# Patient Record
Sex: MALE
[De-identification: names, ages, dates, MRNs, and addresses within clinical notes are randomized per-mention and may not be internally consistent; named-entity substitution may affect disease eponyms.]

---

## 2015-12-08 LAB
ALBUMIN SERPL-MCNC: 3.7 G/DL (ref 3.5–5)
ALBUMIN/GLOB SERPL: 1.1 {RATIO} (ref 1–2.1)
ALT SERPL-CCNC: 40 U/L (ref 21–72)
ARTERIAL BLOOD GAS HEMOGLOBIN: 14.2 G/DL (ref 11.7–17.4)
ARTERIAL BLOOD GAS O2 SAT: 99.8 % (ref 95–98)
ARTERIAL BLOOD GAS PCO2: 39 MM/HG (ref 35–45)
ARTERIAL BLOOD GAS TCO2: 23.7 MMOL/L (ref 22–28)
ARTERIAL PATENCY WRIST A: YES
AST SERPL-CCNC: 133 U/L (ref 17–59)
BASOPHILS # BLD AUTO: 0.1 K/UL (ref 0–0.2)
BASOPHILS NFR BLD: 0.5 % (ref 0–2)
BUN SERPL-MCNC: 18 MG/DL (ref 9–20)
BUN SERPL-MCNC: 24 MG/DL (ref 9–20)
CALCIUM SERPL-MCNC: 8.5 MG/DL (ref 8.4–10.2)
CALCIUM SERPL-MCNC: 8.6 MG/DL (ref 8.4–10.2)
EOSINOPHIL # BLD AUTO: 0.3 K/UL (ref 0–0.7)
EOSINOPHIL NFR BLD: 2.4 % (ref 0–4)
ERYTHROCYTE [DISTWIDTH] IN BLOOD BY AUTOMATED COUNT: 14.9 % (ref 11.5–14.5)
FOLATE SERPL-MCNC: 11.1 NG/ML
GFR NON-AFRICAN AMERICAN: 49
GFR NON-AFRICAN AMERICAN: > 60
HCO3 BLDA-SCNC: 22.8 MMOL/L (ref 21–28)
HGB BLD-MCNC: 13.4 G/DL (ref 12–18)
INHALED O2 CONCENTRATION: 100 %
LYMPHOCYTES # BLD AUTO: 5.9 K/UL (ref 1–4.3)
LYMPHOCYTES NFR BLD AUTO: 42.9 % (ref 20–40)
MCH RBC QN AUTO: 33.4 PG (ref 27–31)
MCHC RBC AUTO-ENTMCNC: 31.1 G/DL (ref 33–37)
MCV RBC AUTO: 107.5 FL (ref 80–94)
MONOCYTES # BLD: 0.9 K/UL (ref 0–0.8)
MONOCYTES NFR BLD: 6.6 % (ref 0–10)
NEUTROPHILS # BLD: 6.5 K/UL (ref 1.8–7)
NEUTROPHILS NFR BLD AUTO: 47.6 % (ref 50–75)
NRBC BLD AUTO-RTO: 0.1 % (ref 0–0)
O2 CAP BLDA-SCNC: 19.2 ML/DL (ref 16–24)
O2 CT BLDA-SCNC: 19.2 ML/DL (ref 15–23)
PH BLDA: 7.37 [PH] (ref 7.35–7.45)
PLATELET # BLD: 199 K/UL (ref 130–400)
PMV BLD AUTO: 7.9 FL (ref 7.2–11.7)
PO2 BLDA: 322 MM/HG (ref 80–100)
RBC # BLD AUTO: 4.02 MIL/UL (ref 4.4–5.9)
WBC # BLD AUTO: 13.6 K/UL (ref 4.8–10.8)

## 2015-12-08 PROCEDURE — 6A4Z0ZZ HYPOTHERMIA, SINGLE: ICD-10-PCS | Performed by: FAMILY MEDICINE

## 2015-12-08 PROCEDURE — 3E0336Z INTRODUCTION OF NUTRITIONAL SUBSTANCE INTO PERIPHERAL VEIN, PERCUTANEOUS APPROACH: ICD-10-PCS | Performed by: FAMILY MEDICINE

## 2015-12-08 PROCEDURE — 5A1955Z RESPIRATORY VENTILATION, GREATER THAN 96 CONSECUTIVE HOURS: ICD-10-PCS | Performed by: FAMILY MEDICINE

## 2015-12-08 PROCEDURE — 3E0234Z INTRODUCTION OF SERUM, TOXOID AND VACCINE INTO MUSCLE, PERCUTANEOUS APPROACH: ICD-10-PCS | Performed by: FAMILY MEDICINE

## 2015-12-08 RX ADMIN — WATER SCH MLS/HR: 1 INJECTION INTRAMUSCULAR; INTRAVENOUS; SUBCUTANEOUS at 20:50

## 2015-12-08 RX ADMIN — WATER SCH MLS/HR: 1 INJECTION INTRAMUSCULAR; INTRAVENOUS; SUBCUTANEOUS at 15:31

## 2015-12-08 NOTE — CARD
--------------- APPROVED REPORT --------------





EKG Measurement

Heart [id number]

AK [id number]

[id number]

[id number]

[id number]



<Conclusion>

Sinus tachycardia

Possible Left atrial enlargement

Left ventricular hypertrophy

Nonspecific ST and T wave abnormality

Prolonged QT

Abnormal ECG

## 2015-12-08 NOTE — CP.PCM.CON
History of Present Illness





- History of Present Illness


History of Present Illness: 


This is a 55 year old male had a witnessed cardiac arrest at the [facility] and 

received CPR by one of the bystanders. EMS arrived 15 mn after the initial 

event and he was found to be in pulses v. tach with no pulseand came back to 

sinus tachycardia after one shock, this was repeated twice. Once in ER he has 

been in sinus tachycardia. He was initially unresponsive and now is agitated, 

but no able to follow commands and is only agitated. 


Initial labs show anion gap metabolic acidosis, elevated htc with high MCV. No 

response on my initial assessment to voice or deep pain. 


He was started on amiodarone drip and is on cooling protocol. He has a lot of 

secretions from the ET tube and was agitated, but no response to pain on the 

legs, only upper extremities, does  not open eyes to command. BP has been on 

the high side. 





ROS: unable to do





allergies: unable to obtain





FH: unable to obtain





PMH: 


unable to obtain





social: might be an alcoholic, incrased MCV, and as per friend drinks alcohol 

occasionally





pe:


bp 98/40, hr 96, T 97.8, O2 96% on 35% Fio2


obtunded ms


lungs clear bilateral air of entry


unable to auscultate murmurs


abdomen soft, non tender


scrotum very enlarged with soft mass in, skin rough and thick


chronic leg edema changes


obtunded ms, no dolls eyes, cough and gag present, pupils mildly reactive and 

equal, moves arms to pain, minimal to no movement of legs with pain. 





a/p:


cardiac arrest: on cooling protocol, but goal of temperature has been hard to 

reach, monitor chem bid and bs q6h


V. tach: on amiodarone drip, mg replaced, dr. lara on board. 


anion gap metabolic acidosis due to increased lactate, decreased after 2 L of 

cold IV fluids and gap has decreased


on zosyn since he has and elevated wbc count and there is milkish material 

coming out of the urethra, send ua, urine culture and uretral swab and cooling 

machine is working a lot to cool the patient, might have fever











Past Patient History





- Past Medical History & Family History


Past Medical History?: Yes





- Past Social History


Smoking Status: Heavy Smoker > 10 Cigarettes Daily





- CARDIAC


Hx Cardiac Disorders:  (unknown)





- NEUROLOGICAL


Hx Neurological Disorder: Yes (seizure)


Hx Seizures: Yes





- HEENT


Hx HEENT Problems: No





- RENAL


Hx Chronic Kidney Disease: No





- ENDOCRINE/METABOLIC


Hx Endocrine Disorders: No





- HEMATOLOGICAL/ONCOLOGICAL


Hx Blood Disorders: No





- INTEGUMENTARY


Hx Dermatological Problems: No





- MUSCULOSKELETAL/RHEUMATOLOGICAL


Hx Falls: No





- GENITOURINARY/GYNECOLOGICAL


Hx Genitourinary Disorders: No





- PSYCHIATRIC


Hx Substance Use: No





Meds


Allergies/Adverse Reactions: 


 Allergies











Allergy/AdvReac Type Severity Reaction Status Date / Time


 


Unobtainable Allergy   Verified 12/08/15 09:37














- Medications


Medications: 


 Current Medications





Enoxaparin Sodium (Lovenox)  40 mg SC DAILY MAGDALENA


Amiodarone HCl 450 mg/ (Dextrose)  259 mls @ 17.26 mls/hr IVPB .Q15H1M MAGDALENA; 0.5 

MG/MIN


   PRN Reason: Protocol


Propofol (Diprivan)  100 mls @ 2.041 mls/hr IV .Q24H MAGDALENA; 5 MCG/KG/MIN


   PRN Reason: Protocol


   Stop: 12/09/15 12:25


   Last Admin: 12/08/15 12:36 Dose:  2.041 mls/hr


Lactated Ringer's (Lactated Ringer's)  1,000 mls @ 75 mls/hr IV .[id number] MAGDALENA


   Last Admin: 12/08/15 15:29 Dose:  75 mls/hr


Piperacillin Sod/Tazobactam (Sod 3.375 gm/ Sodium Chloride)  100 mls @ 100 mls/

hr IVPB Q6H MAGDALENA


   Last Admin: 12/08/15 15:31 Dose:  100 mls/hr


Insulin Human Regular (Humulin R)  0 units SC Q6H MAGDALENA


   PRN Reason: Protocol


Multivitamins/Minerals (Therapeutic-M Tab)  1 tab PO DAILY MAGDALENA


Pantoprazole Sodium (Protonix Inj)  40 mg IVP DAILY MAGDALENA











Results





- Vital Signs


Recent Vital Signs: 


 Last Vital Signs











Temp  97.5 F L  12/08/15 18:00


 


Pulse  95 H  12/08/15 18:00


 


Resp  22   12/08/15 18:00


 


BP  99/78 L  12/08/15 18:00


 


Pulse Ox  100   12/08/15 18:00














- Labs


Result Diagrams: 


 12/08/15 10:15





 12/08/15 15:10


Labs: 


 Laboratory Results - last 24 hr











  12/08/15 12/08/15 12/08/15





  13:26 15:10 16:56


 


Sodium   138 


 


Potassium   4.0 


 


Chloride   101 


 


Carbon Dioxide   25 


 


Anion Gap   16 


 


BUN   24 H 


 


Creatinine   1.5 


 


Est GFR ( Amer)   59 


 


Est GFR (Non-Af Amer)   49 


 


POC Glucose (mg/dL)  226 H   152 H


 


Random Glucose   227 H 


 


Lactic Acid   3.2 H 


 


Calcium   8.5 


 


Phosphorus   4.0 


 


Magnesium   1.7

## 2015-12-08 NOTE — CP.PCM.HP
History of Present Illness





- History of Present Illness


History of Present Illness: 


pt found in cardiac arrest in [facility].  pt was successfully resusuciated w/ acls 

protcols and at present is on diprivan, vent and cooling parameters.  pt is 

homeless w/ poor med records. pt non responsive at any time during exam.  

scrotal edema noted on exam and scrotal us ordered by icu attending





Present on Admission





- Present on Admission


Any Indicators Present on Admission: Yes


History of Uncontrolled Diabetes: Yes





Review of Systems





- Review of Systems


Systems not reviewed;Unavailable: Intubated





- Constitutional


Constitutional: UN





- EENT


Eyes: UNREMARKABLE


Ears: UNREMARKABLE


Nose/Mouth/Throat: UNREMARKABLE





- Cardiovascular


Cardiovascular: As Per HPI





- Respiratory


Respiratory: As Per HPI





- Gastrointestinal


Gastrointestinal: UNREMARKABLE





- Genitourinary


Genitourinary: UNREMARKABLE





- Reproductive: Male


Reproductive:Male: As Per HPI





- Musculoskeletal


Musculoskeletal: UNREMARKABLE





- Integumentary


Integumentary: UNREMARKABLE





- Neurological


Neurological: UNREMARKABLE





- Psychiatric


Psychiatric: UNREMARKABLE





- Endocrine


Endocrine: UNREMARKABLE





- Hematologic/Lymphatic


Hematologic: UNREMARKABLE





Past Patient History





- Past Medical History & Family History


Past Medical History?: Yes





- Past Social History


Smoking Status: Heavy Smoker > 10 Cigarettes Daily





- CARDIAC


Hx Cardiac Disorders:  (unknown)





- NEUROLOGICAL


Hx Neurological Disorder: Yes (seizure)


Hx Seizures: Yes





- HEENT


Hx HEENT Problems: No





- RENAL


Hx Chronic Kidney Disease: No





- ENDOCRINE/METABOLIC


Hx Endocrine Disorders: No





- HEMATOLOGICAL/ONCOLOGICAL


Hx Blood Disorders: No





- INTEGUMENTARY


Hx Dermatological Problems: No





- MUSCULOSKELETAL/RHEUMATOLOGICAL


Hx Falls: No





- GENITOURINARY/GYNECOLOGICAL


Hx Genitourinary Disorders: No





- PSYCHIATRIC


Hx Substance Use: No





Meds


Allergies/Adverse Reactions: 


 Allergies











Allergy/AdvReac Type Severity Reaction Status Date / Time


 


Unobtainable Allergy   Verified 12/08/15 09:37














Physical Exam





- Constitutional


Appears: Toxic, In Acute Distress, Unkempt, Chronically Ill





- Head Exam


Head Exam: ATRAUMATIC, NORMAL INSPECTION, NORMOCEPHALIC





- Eye Exam


Eye Exam: EOMI





- Respiratory Exam


Respiratory Exam: Rhonchi


Additional comments: 


vented





- Cardiovascular Exam


Cardiovascular Exam: REGULAR RHYTHM, RRR





- GI/Abdominal Exam


GI & Abdominal Exam: Normal Bowel Sounds, Soft, Unremarkable





- Extremities Exam


Extremities exam: Positive for: normal capillary refill, normal inspection


Additional comments: 


difficult to palpate distal pulses, ?? r/t s/p cardiac arrest vs hypotension vs 

cooling





- Neurological Exam


Neurological exam: Altered





- Skin


Skin Exam: Dry, Intact, Normal Color, Warm





Results





- Vital Signs


Recent Vital Signs: 





 Last Vital Signs











Temp  97.5 F L  12/08/15 18:00


 


Pulse  95 H  12/08/15 18:00


 


Resp  22   12/08/15 18:00


 


BP  99/78 L  12/08/15 18:00


 


Pulse Ox  100   12/08/15 18:00














- Labs


Result Diagrams: 


 12/08/15 10:15





 12/08/15 15:10


Labs: 





 Laboratory Results - last 24 hr











  12/08/15 12/08/15 12/08/15





  13:26 15:10 16:56


 


Sodium   138 


 


Potassium   4.0 


 


Chloride   101 


 


Carbon Dioxide   25 


 


Anion Gap   16 


 


BUN   24 H 


 


Creatinine   1.5 


 


Est GFR ( Amer)   59 


 


Est GFR (Non-Af Amer)   49 


 


POC Glucose (mg/dL)  226 H   152 H


 


Random Glucose   227 H 


 


Lactic Acid   3.2 H 


 


Calcium   8.5 


 


Phosphorus   4.0 


 


Magnesium   1.7 














Assessment/Plan


(1) Cardiac arrest


Current Visit: Yes   Status: Acute


Comment: s/p acls proceudres


cont amiodarone


echo


cardio consult


trops








(2) DVT prophylaxis


Current Visit: Yes   Status: Acute


Comment: lovenox








(3) Scrotal edema


Current Visit: Yes   Status: Acute


Comment: scrotal us


urology consult








(4) History of seizure


Current Visit: Yes   Status: Acute


Comment: neuro consult








(5) Diabetes mellitus with hyperglycemia


Current Visit: Yes   Status: Acute


Comment: riss per protocol











Decision To Admit





- Pt Status Changed To:


Hospital Disposition Of: Inpatient





- Admit Certification


Admit to Inpatient:: After my assessment, the patient will require 

hospitalization for at least two midnights.  This is because of the severity of 

symptoms shown, intensity of services needed, and/or the medical risk in this 

patient being treated as an outpatient.





- .


Bed Request Type: Intensive Care


Admitting Physician: Violette Walker

## 2015-12-08 NOTE — RAD
HISTORY:

cough  



COMPARISON:

No prior. 



FINDINGS:



LUNGS:

Endotracheal tube is seen approximately 5-6 centimeters above the 

ru advancement of the tube by 2-3 centimeters is recommended.



Cardiac pacer pad overlies the left hemithorax somewhat limiting 

evaluation. 



There are increased interstitial markings bilaterally which can be 

seen in the setting of pulmonary vascular congestion. 



PLEURA:

No pleural effusion or pneumothorax.



CARDIOVASCULAR:

Cardiomegaly.  Uncoiled aorta.



OSSEOUS STRUCTURES:

Degenerative changes of the shoulders.



VISUALIZED UPPER ABDOMEN:

Normal.



OTHER FINDINGS:

None.



IMPRESSION:

Endotracheal tube at the level of the thoracic inlet and advancement 

of the tube by 2-3 centimeters is recommended. Discussed with Dr. Padilla in the emergency department at 11:30 a.m. on December 8, 2015. 



Pulmonary vascular congestion and cardiomegaly.

## 2015-12-08 NOTE — CT
PROCEDURE:  CT HEAD WITHOUT CONTRAST.



HISTORY:

Assess for bleeding 



COMPARISON:

None available. 



TECHNIQUE:

Axial computed tomography images were obtained through the head/brain 

without intravenous contrast.  



Radiation dose:



Total exam DLP = 989 mGy-cm.



FINDINGS:



HEMORRHAGE:

No acute intracranial hemorrhage. 



BRAIN:

The patient is status post a right frontal craniotomy. Brain 

parenchymal volume is preserved. Gray-white junction is preserved. No 

mass, mass effect, midline shift or downward tonsillar herniation. No 

extra-axial fluid collection. Intracranial vascular calcification is 

seen.  



VENTRICLES:

No hydrocephalus. 



PARANASAL SINUSES:

Unremarkable as visualized. No significant inflammatory changes.



MASTOID AIR CELLS:

Unremarkable as visualized. No inflammatory changes.



OTHER FINDINGS:

None.



IMPRESSION:

Status post right frontal craniotomy. No acute intracranial findings. 

Intracranial vascular calcification noted.

## 2015-12-08 NOTE — ED PDOC
HPI: Cardiac Arrest


Time Seen by Provider: 12/08/15 09:36


Chief Complaint (Nursing): Cardiac Arrest


Chief Complaint (Provider): Cardiac arrest


History Per: EMS


Reason For Code Blue: Full Arrest


Circumstances: Brought To ED By EMS


Arrest Witnessed By: By-stander


CPR Initiated Prior To MD Arrival?: Yes


Down-Time Before ACLS: Mins


Treatment Initiated Prior To MD Arrival: CPR, BVM Ventilations, Intubation, 

Defibrillation, ACLS Medication Initiation, IV Access


Medications Given Prior To MD Arrival: Epinephrine


Additional Complaint(s): 


Patient is a 55 year old male brought to ED by EMS s/p cardiac arrest 

witnessed. Patient reportedly went into cardiac arrest 15 minute prior to ALS 

arrival, initial found to be in pulseless V-tach. Epi administered with a shock 

in field, complete return of pulses at that time. Patient arrested a second time

, another epi administered with shocks. Pulses returned, patient was intubated 

and transported to ED. Patient upon arrival is intubated in sinus tachycardia 

with a blood pressure of 119/70. 





- Initial Findings


Mentation: Unresponsive


Respirations: None (Assisted)


Pulse: Strong


Rhythm: Tachycardia





Past Medical History


Reviewed: Historical Data, Nursing Documentation, Vital Signs


Vital Signs: 


 Last Vital Signs











Temp  96.3 F L  12/08/15 09:30


 


Pulse  165 H  12/08/15 09:47


 


Resp  20   12/08/15 09:47


 


BP  119/70   12/08/15 09:47


 


Pulse Ox  100   12/08/15 10:07














- Surgical History


Surgical History: No Surg Hx





- Home Medications


Home Medications: 


 Ambulatory Orders











 Medication  Instructions  Recorded


 


Unobtainable  12/08/15














- Allergies


Allergies/Adverse Reactions: 


 Allergies











Allergy/AdvReac Type Severity Reaction Status Date / Time


 


Unobtainable Allergy   Verified 12/08/15 09:37














Review of Systems


Review Of Systems: ROS cannot be obtained secondary to pt's inabilty to answer 

questions.





Physical Exam





- Reviewed


Nursing Documentation Reviewed: Yes


Vital Signs Reviewed: Yes





- Physical Exam


Appears: Positive for: In Acute Distress


Head Exam: Positive for: ATRAUMATIC, NORMAL INSPECTION, NORMOCEPHALIC


Skin: Positive for: Warm


Eye Exam: Positive for: Other (2mm equal and slowly reactive )


Cardiovascular/Chest: Positive for: Regular Rate, Rhythm.  Negative for: Murmur


Respiratory: Positive for: Normal Breath Sounds (equal bilaterally with 

ventilations)


Gastrointestinal/Abdominal: Negative for: Mass, Distended


Extremity: Negative for: Pedal Edema, Deformity


Neurologic/Psych: Positive for: Other (unresponsive to painful or verbal 

stimuli )





- Laboratory Results


Result Diagrams: 


 12/08/15 10:15








- ECG


O2 Sat by Pulse Oximetry: 100





- Progress


ED Course And Treament: 


Time: 09:30





Initial impression: Cardiac arrest with return of pulses





Initial plan: Continue ventilation assistance, Administer Amiodarone IV drop 

with therapeutic hypothermia 


-- ABG every 30 minutes


-- CT-head


-- EKG


-- CMP


-- Lactid acid


-- Troponin


-- CBC


-- CXR


-- Blood cultures


-- Urine cultures


-- Amiodarone





Medical Decision Making


Medical Decision Making: 


Scribe Attestation:


Documented by Elisabet Delcid acting as a scribe for Geovanny Dempsey MD. 





MD Scribe Attestation:


All medical record entries made by the Scribe were at my direction and 

personally dictated by me. I have reviewed the chart and agree that the record 

accurately reflects my personal performance of the history, physical exam, 

medical decision making, and the department course for this patient. I have 

also personally directed, reviewed, and agree with the discharge instructions 

and disposition.





Disposition





- Clinical Impression


Clinical Impression: 


 Cardiac arrest, Cardiac arrhythmia





- Patient ED Disposition


Is Patient to be Admitted: Yes





- Disposition


Disposition Time: 10:58


Condition: CRITICAL





- Pt Status Changed To:


Hospital Disposition Of: Inpatient





- Admit Certification


Admit to Inpatient:: After my assessment, the patient will require 

hospitalization for at least two midnights.  This is because of the severity of 

symptoms shown, intensity of services needed, and/or the medical risk in this 

patient being treated as an outpatient.





- POA


Present On Arrival: None





Critical Care Time





- Critical Care Note


Total Time (in mins): 35


Documented critical care: time excludes all time spent performing seperately 

billable procedures.

## 2015-12-08 NOTE — CP.PCM.CON
History of Present Illness





- History of Present Illness


History of Present Illness: 


I was asked to see patient by Dr. Walker and ELOY Obrien.  





Patient is a 55 year old male who is s/p cardiac arrest.  By reprot he was 

found to be in Vfib.  he is s/p ACLS, anow intubated on amiodarone drip.





Review of Systems





- Review of Systems


Systems not reviewed;Unavailable: Intubated





Past Patient History





- Past Medical History & Family History


Past Medical History?: Yes





- Past Social History


Smoking Status: Heavy Smoker > 10 Cigarettes Daily





- CARDIAC


Hx Cardiac Disorders:  (unknown)





- NEUROLOGICAL


Hx Neurological Disorder: Yes (seizure)


Hx Seizures: Yes





- HEENT


Hx HEENT Problems: No





- RENAL


Hx Chronic Kidney Disease: No





- ENDOCRINE/METABOLIC


Hx Endocrine Disorders: No





- HEMATOLOGICAL/ONCOLOGICAL


Hx Blood Disorders: No





- INTEGUMENTARY


Hx Dermatological Problems: No





- MUSCULOSKELETAL/RHEUMATOLOGICAL


Hx Falls: No





- GENITOURINARY/GYNECOLOGICAL


Hx Genitourinary Disorders: No





- PSYCHIATRIC


Hx Substance Use: No





Meds


Allergies/Adverse Reactions: 


 Allergies











Allergy/AdvReac Type Severity Reaction Status Date / Time


 


Unobtainable Allergy   Verified 12/08/15 09:37














- Medications


Medications: 


 Current Medications





Enoxaparin Sodium (Lovenox)  40 mg SC DAILY MAGDALENA


Amiodarone HCl 450 mg/ (Dextrose)  259 mls @ 17.26 mls/hr IVPB .Q15H1M MAGDALENA; 0.5 

MG/MIN


   PRN Reason: Protocol


Amiodarone HCl 450 mg/ (Dextrose)  259 mls @ 34.53 mls/hr IVPB .Q7H31M MAGDALENA; 1 MG

/MIN


   PRN Reason: Protocol


   Stop: 12/08/15 17:45


   Last Admin: 12/08/15 11:27 Dose:  34.53 mls/hr


Propofol (Diprivan)  100 mls @ 2.041 mls/hr IV .Q24H MAGDALENA; 5 MCG/KG/MIN


   PRN Reason: Protocol


   Stop: 12/09/15 12:25


   Last Admin: 12/08/15 12:36 Dose:  2.041 mls/hr


Sodium Chloride (Sodium Chloride 0.9%)  1,000 mls @ 999 mls/hr IV .Q1H1M MAGDALENA


   Stop: 12/08/15 16:30


   Last Admin: 12/08/15 14:30 Dose:  999 mls/hr


Lactated Ringer's (Lactated Ringer's)  1,000 mls @ 75 mls[facility]hr [id number] [facility]


   Last Admin: 12/08/15 15:29 Dose:  75 mls/hr


Piperacillin Sod/Tazobactam (Sod 3.375 gm/ Sodium Chloride)  100 mls @ 100 mls/

hr IVPB Q6H [facility]


   Last Admin: 12/08/15 15:31 Dose:  100 mls/hr


Insulin Human Regular (Humulin R)  0 units SC Q6H [facility]


   PRN Reason: Protocol


Multivitamins/Minerals (Therapeutic-M Tab)  1 tab PO DAILY [facility]


Pantoprazole Sodium (Protonix Inj)  40 mg IVP DAILY [facility]











Physical Exam





- Constitutional


Appears: Toxic





- Head Exam


Head Exam: NORMAL INSPECTION





- Eye Exam


Eye Exam: Normal appearance





- ENT Exam


ENT Exam: Mucous Membranes Dry





- Neck Exam


Neck exam: Positive for: Full Rom





- Respiratory Exam


Respiratory Exam: Decreased Breath Sounds





- Cardiovascular Exam


Cardiovascular Exam: Tachycardia





- GI/Abdominal Exam


GI & Abdominal Exam: Normal Bowel Sounds





- Rectal Exam


Rectal Exam: Deferred





- Extremities Exam


Extremities exam: Negative for: pedal edema





- Back Exam


Back exam: NORMAL INSPECTION





- Neurological Exam


Neurological exam: Alert, Oriented x3





- Skin


Skin Exam: Normal Color





Results





- Vital Signs


Recent Vital Signs: 


 Last Vital Signs











Temp  97.9 F   12/08/15 12:30


 


Pulse  100 H  12/08/15 13:16


 


Resp  30 H  12/08/15 14:10


 


BP  119/76   12/08/15 12:46


 


Pulse Ox  100   12/08/15 12:46














- Labs


Result Diagrams: 


 12/08/15 10:15





 12/08/15 15:10


Labs: 


 Laboratory Results - last 24 hr











  12/08/15 12/08/15





  13:26 15:10


 


Sodium   138


 


Potassium   4.0


 


Chloride   101


 


Carbon Dioxide   25


 


Anion Gap   16


 


BUN   24 H


 


Creatinine   1.5


 


Est GFR ( Amer)   59


 


Est GFR (Non-Af Amer)   49


 


POC Glucose (mg/dL)  226 H 


 


Random Glucose   227 H


 


Lactic Acid   3.2 H


 


Calcium   8.5


 


Phosphorus   4.0


 


Magnesium   1.7














- EKG Data


EKG Interpreted by: Myself





Assessment/Plan


(1) Cardiac arrest


Assessment and plan: 


unclear etiology.  Recommend serial cardiac enzymes.  Check troponin.  check 

echocardiogram.  continue amiodarone.


Current Visit: Yes   Status: Acute

## 2015-12-08 NOTE — CON
DATE: 12/08/2015



CHIEF COMPLAINT:  Status post cardiac arrest and history of seizure.



History is obtained from medical records since the patient is intubated and status post cardiac arres
t.



HISTORY OF PRESENT ILLNESS:  This is a 55-year-old man with limited history obtained, was brought to 
the hospital because he had a witnessed cardiac arrest.  Apparently, the patient went into cardiac ar
rest prior to the arrival ____ was found to be in pulseless V tach.  Epinephrine was administered wit
h a shock in the field and ____ return pulses occurred and then the patient had arrest the second bill
e where another epinephrine was administered with shocks and pulses returned and the patient was intu
bated and transferred to the Emergency Room.  Upon arrival, the patient was intubated in sinus tachyc
ardia with a blood pressure 119/70.  I was consulted since this patient has a remote history of seizu
re disorder, but he is currently not ____.  The patient is stable on propofol at this time.



PAST MEDICAL HISTORY:  Unobtainable.



PAST SURGICAL HISTORY:  Unobtainable.



HOME MEDICATIONS:  Unobtainable.



ALLERGIES:  Unobtainable.



REVIEW OF SYSTEMS:  Difficult to obtain 14-point review of systems due to the patient's inability to 
answer questions.



PHYSICAL EXAMINATION:

VITAL SIGNS:  Temperature of 97.9, pulse rate of 108, blood pressure 154/102, respiratory rate of ___
_, oxygen saturation 100% on mechanical ventilation.

GENERAL:  The patient is intubated, on propofol.

HEENT:  Atraumatic, normocephalic.  PERRLA.  Extraocular muscles intact.

NECK:  Supple, no JVD, no adenopathy noted.

LUNGS:  Clear to auscultation.  No adventitious sounds.

HEART:  S1, S2, normal rate and rhythm.  No murmurs, rubs, or gallops.

ABDOMEN:  Soft, nontender, nondistended.  Bowel sounds are present.

EXTREMITIES:  No clubbing, no cyanosis, but has chronic venous stasis changes.

GENITOURINARY:  Enlarged testicular sac.  Enlarged testicles.

NEUROLOGIC:  The patient is intubated on propofol, though speech cannot be assessed at this time.  Cr
anial nerves II-XII are intact.  Motor exam, spontaneous movements in all upper and lower extremities
 are present.  Sensory exam, the patient withdraws to localized noxious stimulus.  Tone is normal thr
oughout.  DTRs are equivocal bilaterally.  DTRs are 1+ throughout.

COORDINATION AND GAIT:  Deferred for now.



LABORATORY DATA:  WBC is 13.6, hemoglobin 13.4, hematocrit 43.2 with a platelet count of ____.  Sodiu
m is 138, potassium 4, chloride of 101, carbon dioxide 25, BUN of 24, creatinine 1.5, and random gluc
ose 227.  Lactic acid is 7.5.



ASSESSMENT AND PLAN:  This is a 55-year-old man with unknown past medical history, who is status post
 cardiac arrest x 2, was consulted since he has a questionable history of seizure disorder.  At this 
time, he is not on any seizure medication.  He did not ____ throughout his admission at this point.  
He is currently on propofol and stable.  He does have an enlarged testicular sac on examination, whic
h needs to be further investigated.  At this time, continue present critical care management.  We sage
l follow up ____.



Once again, thank you for this consult.





__________________________________________

Ronnell Youssef MD







cc:



DD: 12/08/2015 16:03:45  [id number]

TT: 12/08/2015 17:48:36

Job # [id number]

an

## 2015-12-09 LAB
ALBUMIN SERPL-MCNC: 3.7 G/DL (ref 3.5–5)
ALBUMIN/GLOB SERPL: 1 {RATIO} (ref 1–2.1)
ALT SERPL-CCNC: 54 U/L (ref 21–72)
APTT BLD: 29.9 SECONDS (ref 23.1–32)
ARTERIAL BLOOD GAS HEMOGLOBIN: 14.6 G/DL (ref 11.7–17.4)
ARTERIAL BLOOD GAS HEMOGLOBIN: 15.1 G/DL (ref 11.7–17.4)
ARTERIAL BLOOD GAS O2 SAT: 34.9 % (ref 95–98)
ARTERIAL BLOOD GAS O2 SAT: 98.9 % (ref 95–98)
ARTERIAL BLOOD GAS PCO2: 36 MM/HG (ref 35–45)
ARTERIAL BLOOD GAS PCO2: 58 MM/HG (ref 35–45)
ARTERIAL BLOOD GAS TCO2: 20.5 MMOL/L (ref 22–28)
ARTERIAL BLOOD GAS TCO2: 27.2 MMOL/L (ref 22–28)
ARTERIAL PATENCY WRIST A: YES
ARTERIAL PATENCY WRIST A: YES
AST SERPL-CCNC: 100 U/L (ref 17–59)
BASOPHILS # BLD AUTO: 0 K/UL (ref 0–0.2)
BASOPHILS # BLD AUTO: 0 K/UL (ref 0–0.2)
BASOPHILS NFR BLD: 0.1 % (ref 0–2)
BASOPHILS NFR BLD: 0.2 % (ref 0–2)
BUN SERPL-MCNC: 26 MG/DL (ref 9–20)
BUN SERPL-MCNC: 27 MG/DL (ref 9–20)
CALCIUM SERPL-MCNC: 8.8 MG/DL (ref 8.4–10.2)
CALCIUM SERPL-MCNC: 9 MG/DL (ref 8.4–10.2)
EOSINOPHIL # BLD AUTO: 0 K/UL (ref 0–0.7)
EOSINOPHIL # BLD AUTO: 0 K/UL (ref 0–0.7)
EOSINOPHIL NFR BLD: 0 % (ref 0–4)
EOSINOPHIL NFR BLD: 0.1 % (ref 0–4)
ERYTHROCYTE [DISTWIDTH] IN BLOOD BY AUTOMATED COUNT: 14.2 % (ref 11.5–14.5)
ERYTHROCYTE [DISTWIDTH] IN BLOOD BY AUTOMATED COUNT: 14.7 % (ref 11.5–14.5)
GFR NON-AFRICAN AMERICAN: 49
GFR NON-AFRICAN AMERICAN: 53
HCO3 BLDA-SCNC: 20.5 MMOL/L (ref 21–28)
HCO3 BLDA-SCNC: 20.7 MMOL/L (ref 21–28)
HGB BLD-MCNC: 13.8 G/DL (ref 12–18)
HGB BLD-MCNC: 14.3 G/DL (ref 12–18)
INHALED O2 CONCENTRATION: 45 %
INHALED O2 CONCENTRATION: 45 %
INR PPP: 1.01 (ref 0.89–1.2)
LYMPHOCYTE: 10 % (ref 20–50)
LYMPHOCYTES # BLD AUTO: 0.6 K/UL (ref 1–4.3)
LYMPHOCYTES # BLD AUTO: 1.1 K/UL (ref 1–4.3)
LYMPHOCYTES NFR BLD AUTO: 5.4 % (ref 20–40)
LYMPHOCYTES NFR BLD AUTO: 7.8 % (ref 20–40)
MACROCYTES BLD QL SMEAR: (no result)
MCH RBC QN AUTO: 32.7 PG (ref 27–31)
MCH RBC QN AUTO: 33.3 PG (ref 27–31)
MCHC RBC AUTO-ENTMCNC: 31.3 G/DL (ref 33–37)
MCHC RBC AUTO-ENTMCNC: 31.3 G/DL (ref 33–37)
MCV RBC AUTO: 104.3 FL (ref 80–94)
MCV RBC AUTO: 106.4 FL (ref 80–94)
MONOCYTE: 6 % (ref 0–10)
MONOCYTES # BLD: 0.9 K/UL (ref 0–0.8)
MONOCYTES # BLD: 1.6 K/UL (ref 0–0.8)
MONOCYTES NFR BLD: 11.2 % (ref 0–10)
MONOCYTES NFR BLD: 7.6 % (ref 0–10)
NEUTROPHILS # BLD: 11.3 K/UL (ref 1.8–7)
NEUTROPHILS # BLD: 9.7 K/UL (ref 1.8–7)
NEUTROPHILS NFR BLD AUTO: 74 % (ref 42–75)
NEUTROPHILS NFR BLD AUTO: 80.7 % (ref 50–75)
NEUTROPHILS NFR BLD AUTO: 86.9 % (ref 50–75)
NEUTS BAND NFR BLD: 8 % (ref 0–0)
NRBC BLD AUTO-RTO: 0 % (ref 0–0)
NRBC BLD AUTO-RTO: 0.1 % (ref 0–0)
O2 CAP BLDA-SCNC: 20.2 ML/DL (ref 16–24)
O2 CT BLDA-SCNC: 0 ML/DL (ref 15–23)
O2 CT BLDA-SCNC: 20 ML/DL (ref 15–23)
PH BLDA: 7.25 [PH] (ref 7.35–7.45)
PH BLDA: 7.34 [PH] (ref 7.35–7.45)
PLASMACYTES: 2 (ref 0–0)
PLATELET # BLD EST: NORMAL 10*3/UL
PLATELET # BLD: 203 K/UL (ref 130–400)
PLATELET # BLD: 213 K/UL (ref 130–400)
PMV BLD AUTO: 8.1 FL (ref 7.2–11.7)
PMV BLD AUTO: 8.4 FL (ref 7.2–11.7)
PO2 BLDA: 135 MM/HG (ref 80–100)
PO2 BLDA: 22 MM/HG (ref 80–100)
PROTHROMBIN TIME: 10.7 SECONDS (ref 9.4–12)
RBC # BLD AUTO: 4.14 MIL/UL (ref 4.4–5.9)
RBC # BLD AUTO: 4.38 MIL/UL (ref 4.4–5.9)
TEARDROP CELLS: SLIGHT
TOTAL CELLS COUNTED BLD: 100
TROPONIN I SERPL-MCNC: 1.28 NG/ML (ref 0–0.12)
WBC # BLD AUTO: 11.2 K/UL (ref 4.8–10.8)
WBC # BLD AUTO: 14 K/UL (ref 4.8–10.8)

## 2015-12-09 RX ADMIN — WATER SCH MLS/HR: 1 INJECTION INTRAMUSCULAR; INTRAVENOUS; SUBCUTANEOUS at 19:58

## 2015-12-09 RX ADMIN — NITROGLYCERIN ONE: 200 INJECTION, SOLUTION INTRAVENOUS at 09:57

## 2015-12-09 RX ADMIN — WATER SCH MLS/HR: 1 INJECTION INTRAMUSCULAR; INTRAVENOUS; SUBCUTANEOUS at 01:59

## 2015-12-09 RX ADMIN — NITROGLYCERIN ONE MLS/HR: 200 INJECTION, SOLUTION INTRAVENOUS at 10:25

## 2015-12-09 RX ADMIN — Medication SCH TAB: at 09:24

## 2015-12-09 RX ADMIN — Medication SCH: at 10:00

## 2015-12-09 RX ADMIN — ENOXAPARIN SODIUM SCH MG: 40 INJECTION SUBCUTANEOUS at 09:23

## 2015-12-09 RX ADMIN — WATER SCH MLS/HR: 1 INJECTION INTRAMUSCULAR; INTRAVENOUS; SUBCUTANEOUS at 09:26

## 2015-12-09 RX ADMIN — WATER SCH MLS/HR: 1 INJECTION INTRAMUSCULAR; INTRAVENOUS; SUBCUTANEOUS at 15:30

## 2015-12-09 NOTE — CP.PCM.PN
Subjective





- Subjective


Subjective: 


pt on vent remains sedated, am labs noted.  d/c case at length w/ dr [id number] 

attending. 


pt remains on cooling parameters and on amiodarone.  nsr in 70s on monitor


diastolic noted to be elevated


scrotal edema remains present





Review of Systems





- Review of Systems


Systems not reviewed;Unavailable: Intubated





- Constitutional


Constitutional: UN





- EENT


Eyes: UNREMARKABLE


Ears: UNREMARKABLE


Nose/Mouth/Throat: UNREMARKABLE





- Cardiovascular


Cardiovascular: As Per HPI





- Respiratory


Respiratory: As Per HPI





- Gastrointestinal


Gastrointestinal: UNREMARKABLE





- Genitourinary


Genitourinary: As Per HPI





- Reproductive: Male


Reproductive:Male: UNREMARKABLE





- Musculoskeletal


Musculoskeletal: UNREMARKABLE





- Integumentary


Integumentary: UNREMARKABLE





- Neurological


Neurological: UNREMARKABLE





- Psychiatric


Psychiatric: UNREMARKABLE





- Endocrine


Endocrine: UNREMARKABLE





- Hematologic/Lymphatic


Hematologic: UNREMARKABLE





Objective





- Vital Signs/Intake and Output


Vital Signs (last 24 hours): 


 Vital Signs - 24 hr











  12/08/15 12/08/15 12/08/15





  10:45 11:00 11:55


 


Temperature   96.9 F L


 


Temperature [   





Assessment]   


 


Pulse Rate 116 H  124 H


 


Pulse Rate [   





Assessment]   


 


Respiratory   





Rate   


 


Respiratory   





Rate [   





Assessment]   


 


Blood Pressure 159/90 H  176/107 H


 


Blood Pressure   





[Assessment]   


 


O2 Sat by Pulse 100 100 100





Oximetry   














  12/08/15 12/08/15 12/08/15





  12:09 12:30 12:46


 


Temperature  97.9 F 


 


Temperature [   





Assessment]   


 


Pulse Rate 110 H 108 H 100 H


 


Pulse Rate [   





Assessment]   


 


Respiratory   





Rate   


 


Respiratory   





Rate [   





Assessment]   


 


Blood Pressure 173/108 H 154/102 H 119/76


 


Blood Pressure   





[Assessment]   


 


O2 Sat by Pulse 100 100 100





Oximetry   














  12/08/15 12/08/15 12/08/15





  13:10 13:16 14:00


 


Temperature 99 F  99.2 F


 


Temperature [   





Assessment]   


 


Pulse Rate 111 H 100 H 103 H


 


Pulse Rate [   





Assessment]   


 


Respiratory 42 H  23





Rate   


 


Respiratory   





Rate [   





Assessment]   


 


Blood Pressure 111/92 H  95/69 L


 


Blood Pressure   





[Assessment]   


 


O2 Sat by Pulse 100  95





Oximetry   














  12/08/15 12/08/15 12/08/15





  14:10 14:45 15:00


 


Temperature  98.9 F 99 F


 


Temperature [   





Assessment]   


 


Pulse Rate  108 H 117 H


 


Pulse Rate [   





Assessment]   


 


Respiratory 30 H 22 37 H





Rate   


 


Respiratory   





Rate [   





Assessment]   


 


Blood Pressure  106/63 134/91 H


 


Blood Pressure   





[Assessment]   


 


O2 Sat by Pulse  100 100





Oximetry   














  12/08/15 12/08/15 12/08/15





  17:00 18:00 20:00


 


Temperature 98.3 F 97.5 F L 95.3 F L


 


Temperature [   95.9 F L





Assessment]   


 


Pulse Rate 102 H 95 H 


 


Pulse Rate [   84





Assessment]   


 


Respiratory 19 22 20





Rate   


 


Respiratory   20





Rate [   





Assessment]   


 


Blood Pressure 101/78 99/78 L 97/76 L


 


Blood Pressure   97/76 L





[Assessment]   


 


O2 Sat by Pulse 100 100 100





Oximetry   














  12/08/15 12/08/15 12/08/15





  21:00 22:00 22:25


 


Temperature 93.7 F L 92.3 F L 


 


Temperature [ 93.7 F L 92.3 F L 





Assessment]   


 


Pulse Rate 82 78 83


 


Pulse Rate [ 82 78 





Assessment]   


 


Respiratory 23 21 





Rate   


 


Respiratory 13 13 





Rate [   





Assessment]   


 


Blood Pressure 109/87 116/99 H 97/76 L


 


Blood Pressure 109/87 116/99 H 





[Assessment]   


 


O2 Sat by Pulse 100 100 





Oximetry   














  12/08/15 12/09/15 12/09/15





  23:00 00:00 01:00


 


Temperature   


 


Temperature [ 93.2 F L 93.6 F L 93.5 F L





Assessment]   


 


Pulse Rate   


 


Pulse Rate [ 77 95 H 95 H





Assessment]   


 


Respiratory   





Rate   


 


Respiratory 21 30 H 20





Rate [   





Assessment]   


 


Blood Pressure   


 


Blood Pressure 128/98 H 168/118 H 118/97 H





[Assessment]   


 


O2 Sat by Pulse   





Oximetry   














  12/09/15 12/09/15 12/09/15





  02:00 03:00 04:00


 


Temperature   


 


Temperature [ 93.2 F L 93.1 F L 93.1 F L





Assessment]   


 


Pulse Rate   


 


Pulse Rate [ 87 81 81





Assessment]   


 


Respiratory   





Rate   


 


Respiratory 19 20 21





Rate [   





Assessment]   


 


Blood Pressure   


 


Blood Pressure 125/97 H 138/100 H 137/100 H





[Assessment]   


 


O2 Sat by Pulse   





Oximetry   














  12/09/15 12/09/15 12/09/15





  05:00 06:00 07:00


 


Temperature   


 


Temperature [ 92.3 F L 91.3 F L 90.2 F L





Assessment]   


 


Pulse Rate   


 


Pulse Rate [ 77 77 82





Assessment]   


 


Respiratory   





Rate   


 


Respiratory 21 22 20





Rate [   





Assessment]   


 


Blood Pressure   


 


Blood Pressure 160/122 H 159/121 H 142/118 H





[Assessment]   


 


O2 Sat by Pulse   





Oximetry   














  12/09/15





  08:00


 


Temperature 


 


Temperature [ 91.2 F L





Assessment] 


 


Pulse Rate 


 


Pulse Rate [ 79





Assessment] 


 


Respiratory 





Rate 


 


Respiratory 17





Rate [ 





Assessment] 


 


Blood Pressure 


 


Blood Pressure 143/113 H





[Assessment] 


 


O2 Sat by Pulse 





Oximetry 











Intake and Output (last 12 hours): 


 Intake & Output











 12/08/15 12/09/15 12/09/15





 18:59 06:59 18:59


 


Intake Total 2569 1273 


 


Output Total 200 355 


 


Balance 2369 918 


 


Intake:   


 


  IV 2325 1000 


 


  Intake, Piggyback 244 273 


 


Output:   


 


  Urine 200 355 


 


    Urethral (Lua) 200 355 


 


Other:   


 


  # Bowel Movements  0 














- Medications


Medications: 


 Current Medications





Enoxaparin Sodium (Lovenox)  40 mg SC DAILY MAGDALENA


Amiodarone HCl 450 mg/ (Dextrose)  259 mls @ 17.26 mls/hr IVPB .Q15H1M MAGDALENA; 0.5 

MG/MIN


   PRN Reason: Protocol


   Last Admin: 12/08/15 22:25 Dose:  17.26 mls/hr


Propofol (Diprivan)  100 mls @ 2.041 mls/hr IV .Q24H MAGDALENA; 5 MCG/KG/MIN


   PRN Reason: Protocol


   Stop: 12/09/15 12:25


   Last Titration: 12/09/15 01:30 Dose:  20 mcg/kg/min


Lactated Ringer's (Lactated Ringer's)  1,000 mls @ 75 mls/hr IV .[id number] MAGDALENA


   Last Admin: 12/08/15 15:29 Dose:  75 mls/hr


Piperacillin Sod/Tazobactam (Sod 3.375 gm/ Sodium Chloride)  100 mls @ 100 mls/

hr IVPB Q6H MAGDALENA


   Last Admin: 12/09/15 01:59 Dose:  100 mls/hr


Vancomycin HCl 1 gm/ Sodium (Chloride)  250 mls @ 125 mls/hr IVPB Q12 MAGDALENA


   Last Admin: 12/08/15 22:25 Dose:  125 mls/hr


Insulin Human Regular (Humulin R)  0 units SC Q6H MAGDALENA


   PRN Reason: Protocol


   Last Admin: 12/09/15 06:44 Dose:  Not Given


Multivitamins/Minerals (Therapeutic-M Tab)  1 tab PO DAILY MAGDALENA


Pantoprazole Sodium (Protonix Inj)  40 mg IVP DAILY MAGDALENA











- Labs


Labs (last 24 hours): 


 Laboratory Results - last 24 hr











  12/08/15 12/08/15 12/08/15





  13:26 15:10 16:56


 


WBC   


 


RBC   


 


Hgb   


 


Hct   


 


MCV   


 


MCH   


 


MCHC   


 


RDW   


 


Plt Count   


 


MPV   


 


Neut % (Auto)   


 


Lymph % (Auto)   


 


Mono % (Auto)   


 


Eos % (Auto)   


 


Baso % (Auto)   


 


Neut #   


 


Lymph #   


 


Mono #   


 


Eos #   


 


Baso #   


 


pCO2   


 


pO2   


 


HCO3   


 


ABG pH   


 


ABG Total CO2   


 


ABG O2 Saturation   


 


ABG O2 Content   


 


ABG Base Excess   


 


ABG Hemoglobin   


 


ABG Carboxyhemoglobin   


 


POC ABG HHb (Measured)   


 


ABG Methemoglobin   


 


ABG O2 Capacity   


 


Chu Test   


 


A-a O2 Difference   


 


Hgb O2 Saturation   


 


FiO2   


 


Sodium   138 


 


Potassium   4.0 


 


Chloride   101 


 


Carbon Dioxide   25 


 


Anion Gap   16 


 


BUN   24 H 


 


Creatinine   1.5 


 


Est GFR ( Amer)   59 


 


Est GFR (Non-Af Amer)   49 


 


POC Glucose (mg/dL)  226 H   152 H


 


Random Glucose   227 H 


 


Lactic Acid   3.2 H 


 


Calcium   8.5 


 


Phosphorus   4.0 


 


Magnesium   1.7 


 


Folate   11.1 














  12/08/15 12/09/15 12/09/15





  21:36 04:05 04:55


 


WBC   14.0 H 


 


RBC   4.14 L 


 


Hgb   13.8 


 


Hct   44.1 


 


MCV   106.4 H 


 


MCH   33.3 H 


 


MCHC   31.3 L 


 


RDW   14.7 H 


 


Plt Count   213 


 


MPV   8.4 


 


Neut % (Auto)   80.7 H 


 


Lymph % (Auto)   7.8 L 


 


Mono % (Auto)   11.2 H 


 


Eos % (Auto)   0.1 


 


Baso % (Auto)   0.2 


 


Neut #   11.3 H 


 


Lymph #   1.1 


 


Mono #   1.6 H 


 


Eos #   0.0 


 


Baso #   0.0 


 


pCO2    36


 


pO2    135 H


 


HCO3    20.5 L


 


ABG pH    7.34 L


 


ABG Total CO2    20.5 L


 


ABG O2 Saturation    98.9 H


 


ABG O2 Content    20.0


 


ABG Base Excess    -5.7 L


 


ABG Hemoglobin    14.6


 


ABG Carboxyhemoglobin    1.4


 


POC ABG HHb (Measured)    1.1


 


ABG Methemoglobin    0.8


 


ABG O2 Capacity    20.2


 


Chu Test    Yes


 


A-a O2 Difference    141.0


 


Hgb O2 Saturation    96.6


 


FiO2    45.0


 


Sodium   140 


 


Potassium   4.4 


 


Chloride   105 


 


Carbon Dioxide   21 L 


 


Anion Gap   18 


 


BUN   27 H 


 


Creatinine   1.4 


 


Est GFR ( Amer)   > 60 


 


Est GFR (Non-Af Amer)   53 


 


POC Glucose (mg/dL)  182 H  


 


Random Glucose   136 H 


 


Lactic Acid   


 


Calcium   8.8 


 


Phosphorus   4.6 H 


 


Magnesium   1.9 


 


Folate   














  12/09/15





  05:56


 


WBC 


 


RBC 


 


Hgb 


 


Hct 


 


MCV 


 


MCH 


 


MCHC 


 


RDW 


 


Plt Count 


 


MPV 


 


Neut % (Auto) 


 


Lymph % (Auto) 


 


Mono % (Auto) 


 


Eos % (Auto) 


 


Baso % (Auto) 


 


Neut # 


 


Lymph # 


 


Mono # 


 


Eos # 


 


Baso # 


 


pCO2 


 


pO2 


 


HCO3 


 


ABG pH 


 


ABG Total CO2 


 


ABG O2 Saturation 


 


ABG O2 Content 


 


ABG Base Excess 


 


ABG Hemoglobin 


 


ABG Carboxyhemoglobin 


 


POC ABG HHb (Measured) 


 


ABG Methemoglobin 


 


ABG O2 Capacity 


 


Chu Test 


 


A-a O2 Difference 


 


Hgb O2 Saturation 


 


FiO2 


 


Sodium 


 


Potassium 


 


Chloride 


 


Carbon Dioxide 


 


Anion Gap 


 


BUN 


 


Creatinine 


 


Est GFR ( Amer) 


 


Est GFR (Non-Af Amer) 


 


POC Glucose (mg/dL)  122 H


 


Random Glucose 


 


Lactic Acid 


 


Calcium 


 


Phosphorus 


 


Magnesium 


 


Folate 














- Constitutional


Appears: Non-toxic, No Acute Distress, Chronically Ill





- Head Exam


Head Exam: ATRAUMATIC, NORMAL INSPECTION, NORMOCEPHALIC





- Eye Exam


Eye Exam: EOMI





- Respiratory Exam


Respiratory Exam: Clear to PA & Lateral


Additional comments: 


vented





- Cardiovascular Exam


Cardiovascular Exam: REGULAR RHYTHM, RRR





- GI/Abdominal Exam


GI & Abdominal Exam: Normal Bowel Sounds, Soft, Unremarkable





- Extremities Exam


Extremities exam: normal capillary refill, normal inspection, pedal pulses 

present





- Back Exam


Back exam: FULL ROM





- Skin


Skin Exam: Dry, Normal Color, Warm





Assessment/Plan


(1) Cardiac arrest


Current Visit: Yes   Status: Acute


Comment: s/p acls proceudres, remains in NSR


cont amiodarone


echo


cardio consult


trops


cont cooling protocol


cont sedation, ivf








(2) DVT prophylaxis


Current Visit: Yes   Status: Acute


Comment: lovenox








(3) Scrotal edema


Current Visit: Yes   Status: Acute


Comment: scrotal us


urology consult








(4) History of seizure


Current Visit: Yes   Status: Acute


Comment: neuro consult








(5) Diabetes mellitus with hyperglycemia


Current Visit: Yes   Status: Acute


Comment: riss per protocol

## 2015-12-09 NOTE — CP.PCM.PN
Subjective





- Subjective


Subjective: 


Patient: ROGER BELTRAN     Acct #:[id number]     Unit: [id number]  


: 1960     Loc: H.ICU/CCU     Room/Bed: H434-1  


Age/Sex: 55 / M     ADM Status: ADM IN     ADM Date:   


      DIS Date:





Progress note:





DATE: 2015





CHIEF COMPLAINT:  F/U up for Status post cardiac arrest and history of seizure.





History is obtained from medical records since the patient is intubated and 

status post cardiac arrest.





SUBJECTIVE:  


This is a 55-year-old man with limited history obtained, was brought to the 

hospital because he had a witnessed cardiac arrest.  Apparently, the patient 

went into cardiac arrest prior to the arrival was found to be in pulseless V 

tach.  Epinephrine was administered with a shock in the field and  return 

pulses occurred and then the patient had arrest the second time where another 

epinephrine was administered with shocks and pulses returned and the patient 

was intubated and transferred to the Emergency Room.  Upon arrival, the patient 

was intubated in sinus tachycardia with a blood pressure 119/70.  I was 

consulted since this patient has a remote history of seizure disorder, but he 

is currently not seizing.  The patient is stable on propofol at this time with 

no purposful movements. Cooling is on going. 





PAST MEDICAL HISTORY:  Unobtainable.





PAST SURGICAL HISTORY:  Unobtainable.





HOME MEDICATIONS:  Unobtainable.





ALLERGIES:  Unobtainable.





REVIEW OF SYSTEMS:  Difficult to obtain 14-point review of systems due to the 

patient's inability to answer questions.





PHYSICAL EXAMINATION:


VITAL SIGNS:  Reviewed. 


GENERAL:  The patient is intubated, on propofol.


HEENT:  Atraumatic, normocephalic.  PERRLA.  Extraocular muscles intact.


NECK:  Supple, no JVD, no adenopathy noted.


LUNGS:  Clear to auscultation.  No adventitious sounds.


HEART:  S1, S2, normal rate and rhythm.  No murmurs, rubs, or gallops.


ABDOMEN:  Soft, nontender, nondistended.  Bowel sounds are present.


EXTREMITIES:  No clubbing, no cyanosis, but has chronic venous stasis changes.


GENITOURINARY:  Enlarged testicular sac.  Enlarged testicles.


NEUROLOGIC:  The patient is intubated on propofol, though speech cannot be 

assessed at this time.  Cranial nerves II-XII are intact.  Motor exam, 

spontaneous movements in all upper and lower extremities are present.  Sensory 

exam, the patient withdraws to localized noxious stimulus.  Tone is normal 

throughout.  DTRs are equivocal bilaterally.  DTRs are 1+ throughout.


COORDINATION AND GAIT:  Deferred for now.





LABORATORY DATA:  Reviewed. 





ASSESSMENT AND PLAN:  This is a 55-year-old man with unknown past medical 

history, who is status post cardiac arrest x 2, was consulted since he has a 

questionable history of seizure disorder.  At this time, he is not on any 

seizure medication.  He did not seize throughout his admission at this point.  

He is currently on propofol and stable.  Cardiac arrest etiolgy is unknown at 

this point. Cooling is on going. Will start slowly to rewarm. 


He does have an enlarged testicular sac on examination, which needs to be 

further investigated by Urology.  


At this time, continue present critical care management.  Will not put on any 

seizure meds at this point. 








Thank you. 








__________________________________________


Ronnell Youssef MD











Objective





- Vital Signs/Intake and Output


Vital Signs (last 24 hours): 


 Vital Signs - 24 hr











  12/08/15 12/08/15 12/08/15





  12:30 12:46 13:10


 


Temperature 97.9 F  99 F


 


Temperature [   





Assessment]   


 


Pulse Rate 108 H 100 H 111 H


 


Pulse Rate [   





Assessment]   


 


Respiratory   42 H





Rate   


 


Respiratory   





Rate [   





Assessment]   


 


Blood Pressure 154/102 H 119/76 111/92 H


 


Blood Pressure   





[Assessment]   


 


O2 Sat by Pulse 100 100 100





Oximetry   














  12/08/15 12/08/15 12/08/15





  13:16 14:00 14:10


 


Temperature  99.2 F 


 


Temperature [   





Assessment]   


 


Pulse Rate 100 H 103 H 


 


Pulse Rate [   





Assessment]   


 


Respiratory  23 30 H





Rate   


 


Respiratory   





Rate [   





Assessment]   


 


Blood Pressure  95/69 L 


 


Blood Pressure   





[Assessment]   


 


O2 Sat by Pulse  95 





Oximetry   














  12/08/15 12/08/15 12/08/15





  14:45 15:00 17:00


 


Temperature 98.9 F 99 F 98.3 F


 


Temperature [   





Assessment]   


 


Pulse Rate 108 H 117 H 102 H


 


Pulse Rate [   





Assessment]   


 


Respiratory 22 37 H 19





Rate   


 


Respiratory   





Rate [   





Assessment]   


 


Blood Pressure 106/63 134/91 H 101/78


 


Blood Pressure   





[Assessment]   


 


O2 Sat by Pulse 100 100 100





Oximetry   














  12/08/15 12/08/15 12/08/15





  18:00 20:00 21:00


 


Temperature 97.5 F L 95.3 F L 93.7 F L


 


Temperature [  95.9 F L 93.7 F L





Assessment]   


 


Pulse Rate 95 H  82


 


Pulse Rate [  84 82





Assessment]   


 


Respiratory 22 20 23





Rate   


 


Respiratory  20 13





Rate [   





Assessment]   


 


Blood Pressure 99/78 L 97/76 L 109/87


 


Blood Pressure  97/76 L 109/87





[Assessment]   


 


O2 Sat by Pulse 100 100 100





Oximetry   














  12/08/15 12/08/15 12/08/15





  22:00 22:25 23:00


 


Temperature 92.3 F L  


 


Temperature [ 92.3 F L  93.2 F L





Assessment]   


 


Pulse Rate 78 83 


 


Pulse Rate [ 78  77





Assessment]   


 


Respiratory 21  





Rate   


 


Respiratory 13  21





Rate [   





Assessment]   


 


Blood Pressure 116/99 H 97/76 L 


 


Blood Pressure 116/99 H  128/98 H





[Assessment]   


 


O2 Sat by Pulse 100  





Oximetry   














  12/09/15 12/09/15 12/09/15





  00:00 01:00 02:00


 


Temperature   


 


Temperature [ 93.6 F L 93.5 F L 93.2 F L





Assessment]   


 


Pulse Rate   


 


Pulse Rate [ 95 H 95 H 87





Assessment]   


 


Respiratory   





Rate   


 


Respiratory 30 H 20 19





Rate [   





Assessment]   


 


Blood Pressure   


 


Blood Pressure 168/118 H 118/97 H 125/97 H





[Assessment]   


 


O2 Sat by Pulse   





Oximetry   














  12/09/15 12/09/15 12/09/15





  03:00 04:00 05:00


 


Temperature   


 


Temperature [ 93.1 F L 93.1 F L 92.3 F L





Assessment]   


 


Pulse Rate   


 


Pulse Rate [ 81 81 77





Assessment]   


 


Respiratory   





Rate   


 


Respiratory 20 21 21





Rate [   





Assessment]   


 


Blood Pressure   


 


Blood Pressure 138/100 H 137/100 H 160/122 H





[Assessment]   


 


O2 Sat by Pulse   





Oximetry   














  12/09/15 12/09/15 12/09/15





  06:00 07:00 08:00


 


Temperature   


 


Temperature [ 91.3 F L 90.2 F L 91.2 F L





Assessment]   


 


Pulse Rate   


 


Pulse Rate [ 77 82 79





Assessment]   


 


Respiratory   





Rate   


 


Respiratory 22 20 17





Rate [   





Assessment]   


 


Blood Pressure   


 


Blood Pressure 159/121 H 142/118 H 143/113 H





[Assessment]   


 


O2 Sat by Pulse   





Oximetry   














  12/09/15 12/09/15 12/09/15





  09:00 10:00 11:00


 


Temperature   


 


Temperature [ 91.3 F L 90.2 F L 31.9 F L





Assessment]   


 


Pulse Rate   


 


Pulse Rate [ 76 70 71





Assessment]   


 


Respiratory   





Rate   


 


Respiratory 19 19 12





Rate [   





Assessment]   


 


Blood Pressure   


 


Blood Pressure 131/95 H 147/112 H 150/116 H





[Assessment]   


 


O2 Sat by Pulse   





Oximetry   














  12/09/15





  12:10


 


Temperature 


 


Temperature [ 90.3 F L





Assessment] 


 


Pulse Rate 


 


Pulse Rate [ 81





Assessment] 


 


Respiratory 





Rate 


 


Respiratory 13





Rate [ 





Assessment] 


 


Blood Pressure 


 


Blood Pressure 128/101 H





[Assessment] 


 


O2 Sat by Pulse 





Oximetry 











Intake and Output (last 12 hours): 


 Intake & Output











 12/08/15 12/09/15 12/09/15





 18:59 06:59 18:59


 


Intake Total 2569 1273 278


 


Output Total 200 355 288


 


Balance 2369 918 -10


 


Intake:   


 


  IV 2325 1000 70


 


  Intake, Piggyback 244 273 158


 


  Oral   0


 


  Tube Feeding   50


 


Output:   


 


  Urine 200 355 288


 


    Urethral (Lua) 200 355 288


 


  Stool   0


 


Other:   


 


  # Bowel Movements  0 














- Medications


Medications: 


 Current Medications





Enoxaparin Sodium (Lovenox)  40 mg SC DAILY [facility]DAL[facility]


   Last Admin: 12/09/15 09:23 Dose:  40 mg


Amiodarone HCl 450 mg/ (Dextrose)  259 mls @ 17.26 mls/hr IVPB .Q15H1M MAGDALENA; 0.5 

MG/MIN


   PRN Reason: Protocol


   Last Admin: 12/09/15 08:16 Dose:  Not Given


Propofol (Diprivan)  100 mls @ 2.041 mls/hr IV .Q24H MAGDALENA; 5 MCG/KG/MIN


   PRN Reason: Protocol


   Stop: 12/09/15 12:25


   Last Titration: 12/09/15 01:30 Dose:  20 mcg/kg/min


Piperacillin Sod/Tazobactam (Sod 3.375 gm/ Sodium Chloride)  100 mls @ 100 mls/

hr IVPB Q6H [facility]AL[facility]


   Last Admin: 12/09/15 09:26 Dose:  100 mls/hr


Vancomycin HCl 1 gm/ Sodium (Chloride)  250 mls @ 125 mls/hr IVPB Q12 [facility]AL[facility]


   Last Admin: 12/09/15 09:58 Dose:  125 mls/hr


Nitroglycerin/Dextrose (Nitroglycerin 50 Mg/250 Ml D5w)  250 mls @ 3 mls/hr IV 

.Q24H ONE; 10 MCG/MIN


   PRN Reason: Protocol


   Stop: 12/10/15 08:39


   Last Admin: 12/09/15 10:25 Dose:  3 mls/hr


Sodium Chloride (Sodium Chloride 0.9%)  1,000 mls @ 150 mls/hr IV .Q6H40M [facility]DALENA


   Stop: 12/10/15 08:41


   Last Admin: 12/09/15 09:45 Dose:  150 mls/hr


Insulin Human Regular (Humulin R)  0 units SC Q6H MAGDALENA


   PRN Reason: Protocol


   Last Admin: 12/09/15 09:44 Dose:  Not Given


Multivitamins/Minerals (Therapeutic-M Tab)  1 tab PO DAILY [facility]


   Last Admin: 12/09/15 10:00 Dose:  Not Given


Pantoprazole Sodium (Protonix Inj)  40 mg IVP DAILY [facility]


   Last Admin: 12/09/15 09:23 Dose:  40 mg











- Labs


Labs (last 24 hours): 


 Laboratory Results - last 24 hr











  12/08/15 12/08/15 12/08/15





  13:26 15:10 16:56


 


WBC   


 


RBC   


 


Hgb   


 


Hct   


 


MCV   


 


MCH   


 


MCHC   


 


RDW   


 


Plt Count   


 


MPV   


 


Neut % (Auto)   


 


Lymph % (Auto)   


 


Mono % (Auto)   


 


Eos % (Auto)   


 


Baso % (Auto)   


 


Neut #   


 


Lymph #   


 


Mono #   


 


Eos #   


 


Baso #   


 


Neutrophils % (Manual)   


 


Band Neutrophils %   


 


Lymphocytes % (Manual)   


 


Monocytes % (Manual)   


 


Plasma Cell % (Manual)   


 


Platelet Estimate   


 


Macrocytosis (manual)   


 


Tear Drop Cells   


 


pCO2   


 


pO2   


 


HCO3   


 


ABG pH   


 


ABG Total CO2   


 


ABG O2 Saturation   


 


ABG O2 Content   


 


ABG Base Excess   


 


ABG Hemoglobin   


 


ABG Carboxyhemoglobin   


 


POC ABG HHb (Measured)   


 


ABG Methemoglobin   


 


ABG O2 Capacity   


 


Chu Test   


 


A-a O2 Difference   


 


Hgb O2 Saturation   


 


FiO2   


 


Sodium   138 


 


Potassium   4.0 


 


Chloride   101 


 


Carbon Dioxide   25 


 


Anion Gap   16 


 


BUN   24 H 


 


Creatinine   1.5 


 


Est GFR ( Amer)   59 


 


Est GFR (Non-Af Amer)   49 


 


POC Glucose (mg/dL)  226 H   152 H


 


Random Glucose   227 H 


 


Hemoglobin A1c   


 


Lactic Acid   3.2 H 


 


Calcium   8.5 


 


Phosphorus   4.0 


 


Magnesium   1.7 


 


Folate   11.1 














  12/08/15 12/09/15 12/09/15





  21:36 04:05 04:55


 


WBC   14.0 H 


 


RBC   4.14 L 


 


Hgb   13.8 


 


Hct   44.1 


 


MCV   106.4 H 


 


MCH   33.3 H 


 


MCHC   31.3 L 


 


RDW   14.7 H 


 


Plt Count   213 


 


MPV   8.4 


 


Neut % (Auto)   80.7 H 


 


Lymph % (Auto)   7.8 L 


 


Mono % (Auto)   11.2 H 


 


Eos % (Auto)   0.1 


 


Baso % (Auto)   0.2 


 


Neut #   11.3 H 


 


Lymph #   1.1 


 


Mono #   1.6 H 


 


Eos #   0.0 


 


Baso #   0.0 


 


Neutrophils % (Manual)   74 


 


Band Neutrophils %   8 H 


 


Lymphocytes % (Manual)   10 L 


 


Monocytes % (Manual)   6 


 


Plasma Cell % (Manual)   2 H 


 


Platelet Estimate   Normal 


 


Macrocytosis (manual)   Moderate 


 


Tear Drop Cells   Slight 


 


pCO2    36


 


pO2    135 H


 


HCO3    20.5 L


 


ABG pH    7.34 L


 


ABG Total CO2    20.5 L


 


ABG O2 Saturation    98.9 H


 


ABG O2 Content    20.0


 


ABG Base Excess    -5.7 L


 


ABG Hemoglobin    14.6


 


ABG Carboxyhemoglobin    1.4


 


POC ABG HHb (Measured)    1.1


 


ABG Methemoglobin    0.8


 


ABG O2 Capacity    20.2


 


Chu Test    Yes


 


A-a O2 Difference    141.0


 


Hgb O2 Saturation    96.6


 


FiO2    45.0


 


Sodium   140 


 


Potassium   4.4 


 


Chloride   105 


 


Carbon Dioxide   21 L 


 


Anion Gap   18 


 


BUN   27 H 


 


Creatinine   1.4 


 


Est GFR ( Amer)   > 60 


 


Est GFR (Non-Af Amer)   53 


 


POC Glucose (mg/dL)  182 H  


 


Random Glucose   136 H 


 


Hemoglobin A1c   5.0 


 


Lactic Acid   


 


Calcium   8.8 


 


Phosphorus   4.6 H 


 


Magnesium   1.9 


 


Folate   














  12/09/15 12/09/15 12/09/15





  05:56 09:45 09:56


 


WBC   


 


RBC   


 


Hgb   


 


Hct   


 


MCV   


 


MCH   


 


MCHC   


 


RDW   


 


Plt Count   


 


MPV   


 


Neut % (Auto)   


 


Lymph % (Auto)   


 


Mono % (Auto)   


 


Eos % (Auto)   


 


Baso % (Auto)   


 


Neut #   


 


Lymph #   


 


Mono #   


 


Eos #   


 


Baso #   


 


Neutrophils % (Manual)   


 


Band Neutrophils %   


 


Lymphocytes % (Manual)   


 


Monocytes % (Manual)   


 


Plasma Cell % (Manual)   


 


Platelet Estimate   


 


Macrocytosis (manual)   


 


Tear Drop Cells   


 


pCO2   


 


pO2   


 


HCO3   


 


ABG pH   


 


ABG Total CO2   


 


ABG O2 Saturation   


 


ABG O2 Content   


 


ABG Base Excess   


 


ABG Hemoglobin   


 


ABG Carboxyhemoglobin   


 


POC ABG HHb (Measured)   


 


ABG Methemoglobin   


 


ABG O2 Capacity   


 


Chu Test   


 


A-a O2 Difference   


 


Hgb O2 Saturation   


 


FiO2   


 


Sodium   


 


Potassium   


 


Chloride   


 


Carbon Dioxide   


 


Anion Gap   


 


BUN   


 


Creatinine   


 


Est GFR ( Amer)   


 


Est GFR (Non-Af Amer)   


 


POC Glucose (mg/dL)  122 H   112 H


 


Random Glucose   


 


Hemoglobin A1c   


 


Lactic Acid   3.5 H 


 


Calcium   


 


Phosphorus   


 


Magnesium   


 


Folate

## 2015-12-09 NOTE — RAD
HISTORY:

on [facility] vent  



COMPARISON:

Comparison is made with prior exam from 12/08/2015 



FINDINGS:



LUNGS:

There is significant artifact reading a rounded grid like appearance, 

compromising evaluation of the chest.



Bilateral airspace opacities are seen with obscuration of the left 

hemidiaphragm, new/ worsened in comparison to prior exam from 

12/08/2015.



Endotracheal tube and feeding tube in satisfactory positioning. 



PLEURA:

No significant pleural effusion identified, no pneumothorax apparent.



CARDIOVASCULAR:

Normal.



OSSEOUS STRUCTURES:

Degenerative changes of the shoulders, left greater than right.



VISUALIZED UPPER ABDOMEN:

Normal.



OTHER FINDINGS:

None.



IMPRESSION:

Increased airspace opacities with new obscuration of the left 

hemidiaphragm. Findings may be related to worsening pulmonary 

vascular congestion, ARDS and or diffuse pneumonia.



Obscuration of the left hemidiaphragm may be due to 

atelectasis/pneumonia or pleural effusion.

## 2015-12-09 NOTE — CP.PCM.PN
Subjective





- Subjective


Subjective: 


Patient remains intubated.  undergoing rewarming process.





Review of Systems





- Review of Systems


Systems not reviewed;Unavailable: Intubated





Objective





- Vital Signs/Intake and Output


Vital Signs (last 24 hours): 


 Vital Signs - 24 hr











  12/08/15 12/09/15 12/09/15





  23:00 00:00 01:00


 


Temperature   


 


Temperature [ 93.2 F L 93.6 F L 93.5 F L





Assessment]   


 


Pulse Rate   


 


Pulse Rate [ 77 95 H 95 H





Assessment]   


 


Respiratory   





Rate   


 


Respiratory 21 30 H 20





Rate [   





Assessment]   


 


Blood Pressure   


 


Blood Pressure 128/98 H 168/118 H 118/97 H





[Assessment]   


 


O2 Sat by Pulse   





Oximetry   














  12/09/15 12/09/15 12/09/15





  02:00 03:00 04:00


 


Temperature   


 


Temperature [ 93.2 F L 93.1 F L 93.1 F L





Assessment]   


 


Pulse Rate   


 


Pulse Rate [ 87 81 81





Assessment]   


 


Respiratory   





Rate   


 


Respiratory 19 20 21





Rate [   





Assessment]   


 


Blood Pressure   


 


Blood Pressure 125/97 H 138/100 H 137/100 H





[Assessment]   


 


O2 Sat by Pulse   





Oximetry   














  12/09/15 12/09/15 12/09/15





  05:00 06:00 07:00


 


Temperature   


 


Temperature [ 92.3 F L 91.3 F L 90.2 F L





Assessment]   


 


Pulse Rate   


 


Pulse Rate [ 77 77 82





Assessment]   


 


Respiratory   





Rate   


 


Respiratory 21 22 20





Rate [   





Assessment]   


 


Blood Pressure   


 


Blood Pressure 160/122 H 159/121 H 142/118 H





[Assessment]   


 


O2 Sat by Pulse   





Oximetry   














  12/09/15 12/09/15 12/09/15





  08:00 09:00 10:00


 


Temperature   


 


Temperature [ 91.2 F L 91.3 F L 90.2 F L





Assessment]   


 


Pulse Rate   


 


Pulse Rate [ 79 76 70





Assessment]   


 


Respiratory   





Rate   


 


Respiratory 17 19 19





Rate [   





Assessment]   


 


Blood Pressure   


 


Blood Pressure 143/113 H 131/95 H 147/112 H





[Assessment]   


 


O2 Sat by Pulse   





Oximetry   














  12/09/15 12/09/15 12/09/15





  11:00 12:10 12:42


 


Temperature   


 


Temperature [ 31.9 F L 90.3 F L 92.0 F L





Assessment]   


 


Pulse Rate   


 


Pulse Rate [ 71 81 83





Assessment]   


 


Respiratory   





Rate   


 


Respiratory 12 13 13





Rate [   





Assessment]   


 


Blood Pressure   


 


Blood Pressure 150/116 H 128/101 H 128/101 H





[Assessment]   


 


O2 Sat by Pulse   





Oximetry   














  12/09/15 12/09/15 12/09/15





  13:14 13:53 13:54


 


Temperature   


 


Temperature [ 93.2 F L 94.4 F L 





Assessment]   


 


Pulse Rate   


 


Pulse Rate [ 88 86 





Assessment]   


 


Respiratory   17





Rate   


 


Respiratory 13 86 H 





Rate [   





Assessment]   


 


Blood Pressure   


 


Blood Pressure 165/126 H 143/110 H 





[Assessment]   


 


O2 Sat by Pulse   





Oximetry   














  12/09/15 12/09/15 12/09/15





  14:55 16:00 17:00


 


Temperature  95.4 F L 94.7 F L


 


Temperature [ 93.9 F L 95.4 F L 94.8 F L





Assessment]   


 


Pulse Rate  98 H 96 H


 


Pulse Rate [ 88 99 H 95 H





Assessment]   


 


Respiratory  15 15





Rate   


 


Respiratory 15 15 15





Rate [   





Assessment]   


 


Blood Pressure  175/127 H 154/98 H


 


Blood Pressure 147/115 H 175/127 H 154/98 H





[Assessment]   


 


O2 Sat by Pulse  100 100





Oximetry   














  12/09/15 12/09/15





  17:52 18:40


 


Temperature 95.0 F L 95.6 F L


 


Temperature [ 95.0 F L 95.6 F L





Assessment]  


 


Pulse Rate 98 H 101 H


 


Pulse Rate [ 98 H 101 H





Assessment]  


 


Respiratory 15 15





Rate  


 


Respiratory 15 15





Rate [  





Assessment]  


 


Blood Pressure 159/104 H 156/106 H


 


Blood Pressure 159/104 H 156/106 H





[Assessment]  


 


O2 Sat by Pulse 100 100





Oximetry  











Intake and Output (last 12 hours): 


 Intake & Output











 12/09/15 12/09/15 12/10/15





 06:59 18:59 06:59


 


Intake Total 1273 1547 100


 


Output Total 355 864 


 


Balance 918 683 100


 


Intake:   


 


  IV 1000 650 100


 


  Intake, Piggyback 273 827 


 


  Oral  20 


 


  Tube Feeding  50 


 


Output:   


 


  Urine 355 864 


 


    Urethral (Lua) 355 864 


 


  Stool  0 


 


Other:   


 


  # Bowel Movements 0  














- Medications


Medications: 


 Current Medications





Enoxaparin Sodium (Lovenox)  40 mg SC DAILY [facility]


   Last Admin: 12/09/15 09:23 Dose:  40 mg


Amiodarone HCl 450 mg/ (Dextrose)  259 mls @ 17.26 mls/hr IVPB .Q15H1M [facility]; 0.5 

MG/MIN


   PRN Reason: Protocol


   Last Admin: 12/09/15 08:16 Dose:  Not Given


Piperacillin Sod/Tazobactam (Sod 3.375 gm/ Sodium Chloride)  100 mls @ 100 mls/

hr IVPB Q6H [facility]


   Last Admin: 12/09/15 19:58 Dose:  100 mls/hr


Vancomycin HCl 1 gm/ Sodium (Chloride)  250 mls @ 125 mls/hr IVPB Q12 [facility]


   Last Admin: 12/09/15 20:00 Dose:  125 mls/hr


Nitroglycerin/Dextrose (Nitroglycerin 50 Mg/250 Ml D5w)  250 mls @ 3 mls/hr IV 

.Q24H ONE; 10 MCG/MIN


   PRN Reason: Protocol


   Stop: 12/10/15 08:39


   Last Admin: 12/09/15 10:25 Dose:  3 mls/hr


Sodium Chloride (Sodium Chloride 0.9%)  1,000 mls @ 150 mls/hr IV .Q6H40M [facility]


   Stop: 12/10/15 08:41


   Last Admin: 12/09/15 14:45 Dose:  150 mls/hr


Propofol (Diprivan)  100 mls @ 2.041 mls/hr IV .Q24H [facility]; 5 MCG/KG/MIN


   PRN Reason: Protocol


   Stop: 12/10/15 13:55


   Last Titration: 12/09/15 22:15 Dose:  35 mcg/kg/min


Insulin Human Regular (Humulin R)  0 units SC Q6H [facility]


   PRN Reason: Protocol


   Last Admin: 12/09/15 22:13 Dose:  Not Given


Morphine Sulfate (Morphine)  4 mg IVP Q2H PRN


   PRN Reason: Pain, moderate (4-7)


   Last Admin: 12/09/15 19:52 Dose:  4 mg


Multivitamins/Vitamin C (Multi-Delyn Liquid)  5 ml PO DAILY [facility]


Pantoprazole Sodium (Protonix Inj)  40 mg IVP DAILY [facility]


   Last Admin: 12/09/15 09:23 Dose:  40 mg











- Labs


Labs (last 24 hours): 


 Laboratory Results - last 24 hr











  12/09/15 12/09/15 12/09/15





  04:05 04:55 05:56


 


WBC  14.0 H  


 


RBC  4.14 L  


 


Hgb  13.8  


 


Hct  44.1  


 


MCV  106.4 H  


 


MCH  33.3 H  


 


MCHC  31.3 L  


 


RDW  14.7 H  


 


Plt Count  213  


 


MPV  8.4  


 


Neut % (Auto)  80.7 H  


 


Lymph % (Auto)  7.8 L  


 


Mono % (Auto)  11.2 H  


 


Eos % (Auto)  0.1  


 


Baso % (Auto)  0.2  


 


Neut #  11.3 H  


 


Lymph #  1.1  


 


Mono #  1.6 H  


 


Eos #  0.0  


 


Baso #  0.0  


 


Neutrophils % (Manual)  74  


 


Band Neutrophils %  8 H  


 


Lymphocytes % (Manual)  10 L  


 


Monocytes % (Manual)  6  


 


Plasma Cell % (Manual)  2 H  


 


Platelet Estimate  Normal  


 


Macrocytosis (manual)  Moderate  


 


Tear Drop Cells  Slight  


 


PT   


 


INR   


 


APTT   


 


pCO2   36 


 


pO2   135 H 


 


HCO3   20.5 L 


 


ABG pH   7.34 L 


 


ABG Total CO2   20.5 L 


 


ABG O2 Saturation   98.9 H 


 


ABG O2 Content   20.0 


 


ABG Base Excess   -5.7 L 


 


ABG Hemoglobin   14.6 


 


ABG Carboxyhemoglobin   1.4 


 


POC ABG HHb (Measured)   1.1 


 


ABG Methemoglobin   0.8 


 


ABG O2 Capacity   20.2 


 


Chu Test   Yes 


 


A-a O2 Difference   141.0 


 


Hgb O2 Saturation   96.6 


 


FiO2   45.0 


 


Blood Gas Comments   


 


Crit Value Called To   


 


Crit Value Read Back   


 


Blood Gas Notified Time   


 


Sodium  140  


 


Potassium  4.4  


 


Chloride  105  


 


Carbon Dioxide  21 L  


 


Anion Gap  18  


 


BUN  27 H  


 


Creatinine  1.4  


 


Est GFR ( Amer)  > 60  


 


Est GFR (Non-Af Amer)  53  


 


POC Glucose (mg/dL)    122 H


 


Random Glucose  136 H  


 


Hemoglobin A1c  5.0  


 


Lactic Acid   


 


Calcium  8.8  


 


Phosphorus  4.6 H  


 


Magnesium  1.9  


 


Total Bilirubin   


 


AST   


 


ALT   


 


Alkaline Phosphatase   


 


Troponin I   


 


Total Protein   


 


Albumin   


 


Globulin   


 


Albumin/Globulin Ratio   














  12/09/15 12/09/15 12/09/15





  09:45 09:56 12:30


 


WBC   


 


RBC   


 


Hgb   


 


Hct   


 


MCV   


 


MCH   


 


MCHC   


 


RDW   


 


Plt Count   


 


MPV   


 


Neut % (Auto)   


 


Lymph % (Auto)   


 


Mono % (Auto)   


 


Eos % (Auto)   


 


Baso % (Auto)   


 


Neut #   


 


Lymph #   


 


Mono #   


 


Eos #   


 


Baso #   


 


Neutrophils % (Manual)   


 


Band Neutrophils %   


 


Lymphocytes % (Manual)   


 


Monocytes % (Manual)   


 


Plasma Cell % (Manual)   


 


Platelet Estimate   


 


Macrocytosis (manual)   


 


Tear Drop Cells   


 


PT   


 


INR   


 


APTT   


 


pCO2   


 


pO2   


 


HCO3   


 


ABG pH   


 


ABG Total CO2   


 


ABG O2 Saturation   


 


ABG O2 Content   


 


ABG Base Excess   


 


ABG Hemoglobin   


 


ABG Carboxyhemoglobin   


 


POC ABG HHb (Measured)   


 


ABG Methemoglobin   


 


ABG O2 Capacity   


 


Chu Test   


 


A-a O2 Difference   


 


Hgb O2 Saturation   


 


FiO2   


 


Blood Gas Comments   


 


Crit Value Called To   


 


Crit Value Read Back   


 


Blood Gas Notified Time   


 


Sodium   


 


Potassium   


 


Chloride   


 


Carbon Dioxide   


 


Anion Gap   


 


BUN   


 


Creatinine   


 


Est GFR ( Amer)   


 


Est GFR (Non-Af Amer)   


 


POC Glucose (mg/dL)   112 H  120 H


 


Random Glucose   


 


Hemoglobin A1c   


 


Lactic Acid  3.5 H  


 


Calcium   


 


Phosphorus   


 


Magnesium   


 


Total Bilirubin   


 


AST   


 


ALT   


 


Alkaline Phosphatase   


 


Troponin I   


 


Total Protein   


 


Albumin   


 


Globulin   


 


Albumin/Globulin Ratio   














  12/09/15 12/09/15 12/09/15





  15:30 15:45 16:10


 


WBC  11.2 H  


 


RBC  4.38 L  


 


Hgb  14.3  


 


Hct  45.7  


 


MCV  104.3 H D  


 


MCH  32.7 H  


 


MCHC  31.3 L  


 


RDW  14.2  


 


Plt Count  203  


 


MPV  8.1  


 


Neut % (Auto)  86.9 H  


 


Lymph % (Auto)  5.4 L  


 


Mono % (Auto)  7.6  


 


Eos % (Auto)  0.0  


 


Baso % (Auto)  0.1  


 


Neut #  9.7 H  


 


Lymph #  0.6 L  


 


Mono #  0.9 H  


 


Eos #  0.0  


 


Baso #  0.0  


 


Neutrophils % (Manual)   


 


Band Neutrophils %   


 


Lymphocytes % (Manual)   


 


Monocytes % (Manual)   


 


Plasma Cell % (Manual)   


 


Platelet Estimate   


 


Macrocytosis (manual)   


 


Tear Drop Cells   


 


PT  10.7  


 


INR  1.01  


 


APTT  29.9  


 


pCO2   58 H 


 


pO2   22 L* 


 


HCO3   20.7 L 


 


ABG pH   7.25 L 


 


ABG Total CO2   27.2 


 


ABG O2 Saturation   34.9 L 


 


ABG O2 Content   0 L 


 


ABG Base Excess   -3.0 L 


 


ABG Hemoglobin   15.1 


 


ABG Carboxyhemoglobin   1.0 


 


POC ABG HHb (Measured)   64.0 H 


 


ABG Methemoglobin   0.8 


 


ABG O2 Capacity   


 


Chu Test   Yes 


 


A-a O2 Difference   0.0 


 


Hgb O2 Saturation   34.3 L 


 


FiO2   45.0 


 


Blood Gas Comments   162 


 


Crit Value Called To   Yes 


 


Crit Value Read Back   Dr luann dunn 


 


Blood Gas Notified Time   1549 


 


Sodium  139  


 


Potassium  3.9  


 


Chloride  102  


 


Carbon Dioxide  25  


 


Anion Gap  16  


 


BUN  26 H  


 


Creatinine  1.5  


 


Est GFR ( Amer)  59  


 


Est GFR (Non-Af Amer)  49  


 


POC Glucose (mg/dL)    91


 


Random Glucose  129 H  


 


Hemoglobin A1c   


 


Lactic Acid  2.9 H  


 


Calcium  9.0  


 


Phosphorus  5.3 H  


 


Magnesium  2.1  


 


Total Bilirubin  0.8  


 


AST  100 H D  


 


ALT  54  


 


Alkaline Phosphatase  124  


 


Troponin I  1.2800 H*  


 


Total Protein  7.5  


 


Albumin  3.7  


 


Globulin  3.7  


 


Albumin/Globulin Ratio  1.0  














  12/09/15 12/09/15





  16:33 20:05


 


WBC  


 


RBC  


 


Hgb  


 


Hct  


 


MCV  


 


MCH  


 


MCHC  


 


RDW  


 


Plt Count  


 


MPV  


 


Neut % (Auto)  


 


Lymph % (Auto)  


 


Mono % (Auto)  


 


Eos % (Auto)  


 


Baso % (Auto)  


 


Neut #  


 


Lymph #  


 


Mono #  


 


Eos #  


 


Baso #  


 


Neutrophils % (Manual)  


 


Band Neutrophils %  


 


Lymphocytes % (Manual)  


 


Monocytes % (Manual)  


 


Plasma Cell % (Manual)  


 


Platelet Estimate  


 


Macrocytosis (manual)  


 


Tear Drop Cells  


 


PT  


 


INR  


 


APTT  


 


pCO2  


 


pO2  


 


HCO3  


 


ABG pH  


 


ABG Total CO2  


 


ABG O2 Saturation  


 


ABG O2 Content  


 


ABG Base Excess  


 


ABG Hemoglobin  


 


ABG Carboxyhemoglobin  


 


POC ABG HHb (Measured)  


 


ABG Methemoglobin  


 


ABG O2 Capacity  


 


Chu Test  


 


A-a O2 Difference  


 


Hgb O2 Saturation  


 


FiO2  


 


Blood Gas Comments  


 


Crit Value Called To  


 


Crit Value Read Back  


 


Blood Gas Notified Time  


 


Sodium  


 


Potassium  


 


Chloride  


 


Carbon Dioxide  


 


Anion Gap  


 


BUN  


 


Creatinine  


 


Est GFR ( Amer)  


 


Est GFR (Non-Af Amer)  


 


POC Glucose (mg/dL)  99  101


 


Random Glucose  


 


Hemoglobin A1c  


 


Lactic Acid  


 


Calcium  


 


Phosphorus  


 


Magnesium  


 


Total Bilirubin  


 


AST  


 


ALT  


 


Alkaline Phosphatase  


 


Troponin I  


 


Total Protein  


 


Albumin  


 


Globulin  


 


Albumin/Globulin Ratio  














- Constitutional


Appears: Toxic





- Head Exam


Head Exam: NORMAL INSPECTION





- Eye Exam


Eye Exam: Normal appearance





- ENT Exam


ENT Exam: Mucous Membranes Dry





- Neck Exam


Neck exam: absent: Thyromegaly





- Respiratory Exam


Respiratory Exam: Decreased Breath Sounds





- Cardiovascular Exam


Cardiovascular Exam: REGULAR RHYTHM





- GI/Abdominal Exam


GI & Abdominal Exam: Normal Bowel Sounds





- Rectal Exam


Rectal Exam: Deferred





- Extremities Exam


Extremities exam: pedal edema





- Skin


Skin Exam: Normal Color





Assessment/Plan


(1) Cardiac arrest


Assessment and plan: 


unclear etiology.  Recommend serial cardiac enzymes.  Check troponin.  check 

echocardiogram.  continue amiodarone.








Current Visit: Yes   Status: Acute





(2) NSTEMI (non-ST elevated myocardial infarction)


Assessment and plan: 


Patient may have underlying CAD as a cause of his acute event and cardiac 

arrest.  If patient recovers, will require cardiac cath.


Current Visit: Yes   Status: Acute

## 2015-12-09 NOTE — CP.CCUPN
CCU Subjective





- Physician Review


Subjective (Free Text): 





   ***INTENSIVIST PROGRESS NOTE***


Patient examined, interim events reviewed:





Examined this morning at 700AM during interim 18th hour cooling phase of 

Therapeutic Hypothermia. He is sedated on Propofol and not on any vasopressors 

and has been hypertensive 143/113  with MAP near  120 at times.  He is at goal 

temp of 32-33C, HR 79 sinus on 0.5mg/min Amiodarone drip. Breathing 19 on AC 14

, TV 600ml, Peep 5, 45% oxygen and Ve= 10.8, PPP= 22, SPO2 100%. Last 18 Hour I/

Os= 3842/555. 





No other distress noted: 


EXAM-


HEENT: no icterus, no nystagmus, pupils 3 mm and bilat reactive, no gaze 

preference,  no nystagmus


NECK: no visible JVD, supple, carotids equal upstroke bilat/no bruits


CHEST: decreased BS bases, no wheezes audible


HEART:  regular, distant, S1S2, no murmur audible, no rubs.


ABD:  soft, no increased distention, no focal tenderness,  no HSM. BS hypoactive

, 


EXT:    no edema, no peripheral/ digital cyanosis, no palpable cords, distal 

pulses intact and symmetrical


NEURO:  +tone, plantars no response. Otherwise flaccid legs. 


SKIN:   no rashes





LABS


WBC= 14.0


HGB= 13.8


PLTs = 213K


Na= 140


K=  4.4


HCO3= 21


BUN/Cr=  27/1.4


BS= 136


7.34/36/135


CXR: ETT position OK above ru, bilateral hilar interstitial changes, left 

hemidiaphragm not visualizable (my interp).





Assessment: 


1.   Cardiac Arrest with resuscitation from VT. 


2.   Post-Resuscitation Therapeutic Hypothermia


3.   Aspiration Pneumonitis


4.   Scrotal swelling, r/o infection/inflammation/ mass





PLAN:


1.   End of therapeutic hypothermia for 24h will occur at 100Pm today. Re-

warming to proceed at least over 12hours. 


2.   Stop K containing fluids (LR), switch to NSS and increase rate for 

essential oliguria evident now.


3.   Check all repeat labs today again at 4PM.


4.   Empiric abx coverage noted. 


5.   Accucks Q4H, use IV insulin for any persistent hyperglycemia above 180mg/

dl.


6.   Supplement Magnesium.


7.   Occasional bradycardia with HR observed down to 50s with escape 

junctional rhythm, stop Amiodarone drip now. Watch for new arrhythmias during 

the re-warming phase. 


8.   IV NTG with any hypertensive response, keep MAP at approx 80.


9.   Unable to prognosticate until after re-warming. 


10.   Scrotal US study when feasible. 


11.   No MV weans anticipated at this time today. 








Time spent with this patient did not overlap with any other provider's medical 

or critical care time. Additionally the code selected for the services rendered 

in this note includes the time spent:  looking to talk to the patients family, 

associated physicians and reviewing hospital data/results not listed here which 

extended to a total of 50 minutes.








CCU Objective





- Vital Signs / Intake & Output


Intake and Output (Last 8hrs): 


 Intake & Output











 12/08/15 12/09/15 12/09/15





 22:59 06:59 14:59


 


Intake Total 1913 881 748


 


Output Total 310 245 338


 


Balance 1603 636 410


 


Intake:   


 


  IV 1625 700 90


 


  Intake, Piggyback 288 181 608


 


  Oral   0


 


  Tube Feeding   50


 


Output:   


 


  Urine 310 245 338


 


    Urethral (Lua) 310 245 338


 


  Stool   0


 


Other:   


 


  # Bowel Movements  0 














- Medications


Active Medications: 


Active Medications











Generic Name Dose Route Start Last Admin





  Trade Name Freq  PRN Reason Stop Dose Admin


 


Enoxaparin Sodium  40 mg 12/09/15 09:00 12/09/15 09:23





  Lovenox  SC   40 mg





  DAILY MAGDALENA   Administration


 


Amiodarone HCl 450 mg/  259 mls @ 17.26 mls/hr 12/08/15 16:15 12/09/15 08:16





  Dextrose  IVPB   Not Given





  .Q15H1M MAGDALENA   





  Protocol   





  0.5 MG/MIN   


 


Piperacillin Sod/Tazobactam  100 mls @ 100 mls/hr 12/08/15 14:30 12/09/15 09:26





  Sod 3.375 gm/ Sodium Chloride  IVPB   100 mls/hr





  Q6H MAGDALENA   Administration


 


Vancomycin HCl 1 gm/ Sodium  250 mls @ 125 mls/hr 12/08/15 18:45 12/09/15 09:58





  Chloride  IVPB   125 mls/hr





  Q12 MAGDALENA   Administration


 


Nitroglycerin/Dextrose  250 mls @ 3 mls/hr 12/09/15 08:40 12/09/15 10:25





  Nitroglycerin 50 Mg/250 Ml D5w  IV 12/10/15 08:39  3 mls/hr





  .Q24H ONE   Administration





  Protocol   





  10 MCG/MIN   


 


Sodium Chloride  1,000 mls @ 150 mls/hr 12/09/15 08:45 12/09/15 09:45





  Sodium Chloride 0.9%  IV 12/10/15 08:41  150 mls/hr





  .Q6H40M MAGDALENA   Administration


 


Insulin Human Regular  0 units 12/08/15 15:45 12/09/15 09:44





  Humulin R  SC   Not Given





  Q6H MAGDALENA   





  Protocol   


 


Morphine Sulfate  4 mg 12/09/15 13:10  





  Morphine  IVP   





  Q2H PRN   





  Pain, moderate (4-7)   


 


Multivitamins/Minerals  1 tab 12/09/15 09:00 12/09/15 10:00





  Therapeutic-M Tab  PO   Not Given





  DAILY [facility]   


 


Pantoprazole Sodium  40 mg 12/09/15 09:00 12/09/15 09:23





  Protonix Inj  IVP   40 mg





  DAILY MAGDALENA   Administration














- Patient Studies


Lab Studies: 


 Microbiology Studies











 12/08/15 11:00 Urine Culture - Preliminary





 Urine,Catheterized    Gram Negative Kevin








 Lab Studies











  12/09/15 12/09/15 12/09/15 Range/Units





  09:56 09:45 05:56 


 


WBC     (4.8-10.8)  K/uL


 


RBC     (4.40-5.90)  Mil/uL


 


Hgb     (12.0-18.0)  g/dL


 


Hct     (35.0-51.0)  %


 


MCV     (80.0-94.0)  fl


 


MCH     (27.0-31.0)  pg


 


MCHC     (33.0-37.0)  g/dL


 


RDW     (11.5-14.5)  %


 


Plt Count     (130-400)  K/uL


 


MPV     (7.2-11.7)  fl


 


Neut % (Auto)     (50.0-75.0)  %


 


Lymph % (Auto)     (20.0-40.0)  %


 


Mono % (Auto)     (0.0-10.0)  %


 


Eos % (Auto)     (0.0-4.0)  %


 


Baso % (Auto)     (0.0-2.0)  %


 


Neut #     (1.8-7.0)  K/uL


 


Lymph #     (1.0-4.3)  K/uL


 


Mono #     (0.0-0.8)  K/uL


 


Eos #     (0.0-0.7)  K/uL


 


Baso #     (0.0-0.2)  K/uL


 


Neutrophils % (Manual)     (42-75)  %


 


Band Neutrophils %     (0-0)  %


 


Lymphocytes % (Manual)     (20-50)  %


 


Monocytes % (Manual)     (0-10)  %


 


Plasma Cell % (Manual)     (0-0)  


 


Platelet Estimate     (NORMAL)  


 


Macrocytosis (manual)     


 


Tear Drop Cells     


 


pCO2     (35-45)  mm/Hg


 


pO2     ()  mm/Hg


 


HCO3     (21-28)  mmol/L


 


ABG pH     (7.35-7.45)  


 


ABG Total CO2     (22-28)  mmol/L


 


ABG O2 Saturation     (95-98)  %


 


ABG O2 Content     (15-23)  ML/dL


 


ABG Base Excess     (-2.0-3.0)  mmol/L


 


ABG Hemoglobin     (11.7-17.4)  g/dL


 


ABG Carboxyhemoglobin     (0.5-1.5)  %


 


POC ABG HHb (Measured)     (0.0-5.0)  %


 


ABG Methemoglobin     (0.0-3.0)  %


 


ABG O2 Capacity     (16-24)  mL/dL


 


Chu Test     


 


A-a O2 Difference     mm/Hg


 


Hgb O2 Saturation     (95.0-98.0)  %


 


FiO2     %


 


Sodium     (132-148)  mmol/l


 


Potassium     (3.6-5.0)  MMOL/L


 


Chloride     ()  mmol/L


 


Carbon Dioxide     (22-30)  mmol/L


 


Anion Gap     (10-20)  


 


BUN     (9-20)  mg/dl


 


Creatinine     (0.8-1.5)  mg/dL


 


Est GFR (African Amer)     


 


Est GFR (Non-Af Amer)     


 


POC Glucose (mg/dL)  112 H   122 H  ()  mg/dL


 


Random Glucose     ()  mg/dL


 


Hemoglobin A1c     (4.2-6.5)  %


 


Lactic Acid   3.5 H   (0.7-2.1)  MMOL/L


 


Calcium     (8.4-10.2)  mg/dL


 


Phosphorus     (2.5-4.5)  mg/dl


 


Magnesium     (1.6-2.3)  MG/DL


 


Folate     ng/mL














  12/09/15 12/09/15 12/08/15 Range/Units





  04:55 04:05 21:36 


 


WBC   14.0 H   (4.8-10.8)  K/uL


 


RBC   4.14 L   (4.40-5.90)  Mil/uL


 


Hgb   13.8   (12.0-18.0)  g/dL


 


Hct   44.1   (35.0-51.0)  %


 


MCV   106.4 H   (80.0-94.0)  fl


 


MCH   33.3 H   (27.0-31.0)  pg


 


MCHC   31.3 L   (33.0-37.0)  g/dL


 


RDW   14.7 H   (11.5-14.5)  %


 


Plt Count   213   (130-400)  K/uL


 


MPV   8.4   (7.2-11.7)  fl


 


Neut % (Auto)   80.7 H   (50.0-75.0)  %


 


Lymph % (Auto)   7.8 L   (20.0-40.0)  %


 


Mono % (Auto)   11.2 H   (0.0-10.0)  %


 


Eos % (Auto)   0.1   (0.0-4.0)  %


 


Baso % (Auto)   0.2   (0.0-2.0)  %


 


Neut #   11.3 H   (1.8-7.0)  K/uL


 


Lymph #   1.1   (1.0-4.3)  K/uL


 


Mono #   1.6 H   (0.0-0.8)  K/uL


 


Eos #   0.0   (0.0-0.7)  K/uL


 


Baso #   0.0   (0.0-0.2)  K/uL


 


Neutrophils % (Manual)   74   (42-75)  %


 


Band Neutrophils %   8 H   (0-0)  %


 


Lymphocytes % (Manual)   10 L   (20-50)  %


 


Monocytes % (Manual)   6   (0-10)  %


 


Plasma Cell % (Manual)   2 H   (0-0)  


 


Platelet Estimate   Normal   (NORMAL)  


 


Macrocytosis (manual)   Moderate   


 


Tear Drop Cells   Slight   


 


pCO2  36    (35-45)  mm/Hg


 


pO2  135 H    ()  mm/Hg


 


HCO3  20.5 L    (21-28)  mmol/L


 


ABG pH  7.34 L    (7.35-7.45)  


 


ABG Total CO2  20.5 L    (22-28)  mmol/L


 


ABG O2 Saturation  98.9 H    (95-98)  %


 


ABG O2 Content  20.0    (15-23)  ML/dL


 


ABG Base Excess  -5.7 L    (-2.0-3.0)  mmol/L


 


ABG Hemoglobin  14.6    (11.7-17.4)  g/dL


 


ABG Carboxyhemoglobin  1.4    (0.5-1.5)  %


 


POC ABG HHb (Measured)  1.1    (0.0-5.0)  %


 


ABG Methemoglobin  0.8    (0.0-3.0)  %


 


ABG O2 Capacity  20.2    (16-24)  mL/dL


 


Chu Test  Yes    


 


A-a O2 Difference  141.0    mm/Hg


 


Hgb O2 Saturation  96.6    (95.0-98.0)  %


 


FiO2  45.0    %


 


Sodium   140   (132-148)  mmol/l


 


Potassium   4.4   (3.6-5.0)  MMOL/L


 


Chloride   105   ()  mmol/L


 


Carbon Dioxide   21 L   (22-30)  mmol/L


 


Anion Gap   18   (10-20)  


 


BUN   27 H   (9-20)  mg/dl


 


Creatinine   1.4   (0.8-1.5)  mg/dL


 


Est GFR (African Amer)   > 60   


 


Est GFR (Non-Af Amer)   53   


 


POC Glucose (mg/dL)    182 H  ()  mg/dL


 


Random Glucose   136 H   ()  mg/dL


 


Hemoglobin A1c   5.0   (4.2-6.5)  %


 


Lactic Acid     (0.7-2.1)  MMOL/L


 


Calcium   8.8   (8.4-10.2)  mg/dL


 


Phosphorus   4.6 H   (2.5-4.5)  mg/dl


 


Magnesium   1.9   (1.6-2.3)  MG/DL


 


Folate     ng/mL














  12/08/15 12/08/15 12/08/15 Range/Units





  16:56 15:10 13:26 


 


WBC     (4.8-10.8)  K/uL


 


RBC     (4.40-5.90)  Mil/uL


 


Hgb     (12.0-18.0)  g/dL


 


Hct     (35.0-51.0)  %


 


MCV     (80.0-94.0)  fl


 


MCH     (27.0-31.0)  pg


 


MCHC     (33.0-37.0)  g/dL


 


RDW     (11.5-14.5)  %


 


Plt Count     (130-400)  K/uL


 


MPV     (7.2-11.7)  fl


 


Neut % (Auto)     (50.0-75.0)  %


 


Lymph % (Auto)     (20.0-40.0)  %


 


Mono % (Auto)     (0.0-10.0)  %


 


Eos % (Auto)     (0.0-4.0)  %


 


Baso % (Auto)     (0.0-2.0)  %


 


Neut #     (1.8-7.0)  K/uL


 


Lymph #     (1.0-4.3)  K/uL


 


Mono #     (0.0-0.8)  K/uL


 


Eos #     (0.0-0.7)  K/uL


 


Baso #     (0.0-0.2)  K/uL


 


Neutrophils % (Manual)     (42-75)  %


 


Band Neutrophils %     (0-0)  %


 


Lymphocytes % (Manual)     (20-50)  %


 


Monocytes % (Manual)     (0-10)  %


 


Plasma Cell % (Manual)     (0-0)  


 


Platelet Estimate     (NORMAL)  


 


Macrocytosis (manual)     


 


Tear Drop Cells     


 


pCO2     (35-45)  mm/Hg


 


pO2     ()  mm/Hg


 


HCO3     (21-28)  mmol/L


 


ABG pH     (7.35-7.45)  


 


ABG Total CO2     (22-28)  mmol/L


 


ABG O2 Saturation     (95-98)  %


 


ABG O2 Content     (15-23)  ML/dL


 


ABG Base Excess     (-2.0-3.0)  mmol/L


 


ABG Hemoglobin     (11.7-17.4)  g/dL


 


ABG Carboxyhemoglobin     (0.5-1.5)  %


 


POC ABG HHb (Measured)     (0.0-5.0)  %


 


ABG Methemoglobin     (0.0-3.0)  %


 


ABG O2 Capacity     (16-24)  mL/dL


 


Chu Test     


 


A-a O2 Difference     mm/Hg


 


Hgb O2 Saturation     (95.0-98.0)  %


 


FiO2     %


 


Sodium   138   (132-148)  mmol/l


 


Potassium   4.0   (3.6-5.0)  MMOL/L


 


Chloride   101   ()  mmol/L


 


Carbon Dioxide   25   (22-30)  mmol/L


 


Anion Gap   16   (10-20)  


 


BUN   24 H   (9-20)  mg/dl


 


Creatinine   1.5   (0.8-1.5)  mg/dL


 


Est GFR ( Amer)   59   


 


Est GFR (Non-Af Amer)   49   


 


POC Glucose (mg/dL)  152 H   226 H  ()  mg/dL


 


Random Glucose   227 H   ()  mg/dL


 


Hemoglobin A1c     (4.2-6.5)  %


 


Lactic Acid   3.2 H   (0.7-2.1)  MMOL/L


 


Calcium   8.5   (8.4-10.2)  mg/dL


 


Phosphorus   4.0   (2.5-4.5)  mg/dl


 


Magnesium   1.7   (1.6-2.3)  MG/DL


 


Folate   11.1   ng/mL








 Laboratory Results - last 24 hr











  12/08/15 12/08/15 12/08/15





  13:26 15:10 16:56


 


WBC   


 


RBC   


 


Hgb   


 


Hct   


 


MCV   


 


MCH   


 


MCHC   


 


RDW   


 


Plt Count   


 


MPV   


 


Neut % (Auto)   


 


Lymph % (Auto)   


 


Mono % (Auto)   


 


Eos % (Auto)   


 


Baso % (Auto)   


 


Neut #   


 


Lymph #   


 


Mono #   


 


Eos #   


 


Baso #   


 


Neutrophils % (Manual)   


 


Band Neutrophils %   


 


Lymphocytes % (Manual)   


 


Monocytes % (Manual)   


 


Plasma Cell % (Manual)   


 


Platelet Estimate   


 


Macrocytosis (manual)   


 


Tear Drop Cells   


 


pCO2   


 


pO2   


 


HCO3   


 


ABG pH   


 


ABG Total CO2   


 


ABG O2 Saturation   


 


ABG O2 Content   


 


ABG Base Excess   


 


ABG Hemoglobin   


 


ABG Carboxyhemoglobin   


 


POC ABG HHb (Measured)   


 


ABG Methemoglobin   


 


ABG O2 Capacity   


 


Chu Test   


 


A-a O2 Difference   


 


Hgb O2 Saturation   


 


FiO2   


 


Sodium   138 


 


Potassium   4.0 


 


Chloride   101 


 


Carbon Dioxide   25 


 


Anion Gap   16 


 


BUN   24 H 


 


Creatinine   1.5 


 


Est GFR ( Amer)   59 


 


Est GFR (Non-Af Amer)   49 


 


POC Glucose (mg/dL)  226 H   152 H


 


Random Glucose   227 H 


 


Hemoglobin A1c   


 


Lactic Acid   3.2 H 


 


Calcium   8.5 


 


Phosphorus   4.0 


 


Magnesium   1.7 


 


Folate   11.1 














  12/08/15 12/09/15 12/09/15





  21:36 04:05 04:55


 


WBC   14.0 H 


 


RBC   4.14 L 


 


Hgb   13.8 


 


Hct   44.1 


 


MCV   106.4 H 


 


MCH   33.3 H 


 


MCHC   31.3 L 


 


RDW   14.7 H 


 


Plt Count   213 


 


MPV   8.4 


 


Neut % (Auto)   80.7 H 


 


Lymph % (Auto)   7.8 L 


 


Mono % (Auto)   11.2 H 


 


Eos % (Auto)   0.1 


 


Baso % (Auto)   0.2 


 


Neut #   11.3 H 


 


Lymph #   1.1 


 


Mono #   1.6 H 


 


Eos #   0.0 


 


Baso #   0.0 


 


Neutrophils % (Manual)   74 


 


Band Neutrophils %   8 H 


 


Lymphocytes % (Manual)   10 L 


 


Monocytes % (Manual)   6 


 


Plasma Cell % (Manual)   2 H 


 


Platelet Estimate   Normal 


 


Macrocytosis (manual)   Moderate 


 


Tear Drop Cells   Slight 


 


pCO2    36


 


pO2    135 H


 


HCO3    20.5 L


 


ABG pH    7.34 L


 


ABG Total CO2    20.5 L


 


ABG O2 Saturation    98.9 H


 


ABG O2 Content    20.0


 


ABG Base Excess    -5.7 L


 


ABG Hemoglobin    14.6


 


ABG Carboxyhemoglobin    1.4


 


POC ABG HHb (Measured)    1.1


 


ABG Methemoglobin    0.8


 


ABG O2 Capacity    20.2


 


Chu Test    Yes


 


A-a O2 Difference    141.0


 


Hgb O2 Saturation    96.6


 


FiO2    45.0


 


Sodium   140 


 


Potassium   4.4 


 


Chloride   105 


 


Carbon Dioxide   21 L 


 


Anion Gap   18 


 


BUN   27 H 


 


Creatinine   1.4 


 


Est GFR ( Amer)   > 60 


 


Est GFR (Non-Af Amer)   53 


 


POC Glucose (mg/dL)  182 H  


 


Random Glucose   136 H 


 


Hemoglobin A1c   5.0 


 


Lactic Acid   


 


Calcium   8.8 


 


Phosphorus   4.6 H 


 


Magnesium   1.9 


 


Folate   














  12/09/15 12/09/15 12/09/15





  05:56 09:45 09:56


 


WBC   


 


RBC   


 


Hgb   


 


Hct   


 


MCV   


 


MCH   


 


MCHC   


 


RDW   


 


Plt Count   


 


MPV   


 


Neut % (Auto)   


 


Lymph % (Auto)   


 


Mono % (Auto)   


 


Eos % (Auto)   


 


Baso % (Auto)   


 


Neut #   


 


Lymph #   


 


Mono #   


 


Eos #   


 


Baso #   


 


Neutrophils % (Manual)   


 


Band Neutrophils %   


 


Lymphocytes % (Manual)   


 


Monocytes % (Manual)   


 


Plasma Cell % (Manual)   


 


Platelet Estimate   


 


Macrocytosis (manual)   


 


Tear Drop Cells   


 


pCO2   


 


pO2   


 


HCO3   


 


ABG pH   


 


ABG Total CO2   


 


ABG O2 Saturation   


 


ABG O2 Content   


 


ABG Base Excess   


 


ABG Hemoglobin   


 


ABG Carboxyhemoglobin   


 


POC ABG HHb (Measured)   


 


ABG Methemoglobin   


 


ABG O2 Capacity   


 


Chu Test   


 


A-a O2 Difference   


 


Hgb O2 Saturation   


 


FiO2   


 


Sodium   


 


Potassium   


 


Chloride   


 


Carbon Dioxide   


 


Anion Gap   


 


BUN   


 


Creatinine   


 


Est GFR ( Amer)   


 


Est GFR (Non-Af Amer)   


 


POC Glucose (mg/dL)  122 H   112 H


 


Random Glucose   


 


Hemoglobin A1c   


 


Lactic Acid   3.5 H 


 


Calcium   


 


Phosphorus   


 


Magnesium   


 


Folate   











Fingerstick Blood Sugar Results: 120

## 2015-12-10 LAB
ALBUMIN SERPL-MCNC: 3.1 G/DL (ref 3.5–5)
ALBUMIN/GLOB SERPL: 0.9 {RATIO} (ref 1–2.1)
ALT SERPL-CCNC: 44 U/L (ref 21–72)
APPEARANCE UR: (no result)
ARTERIAL BLOOD GAS HEMOGLOBIN: 13.3 G/DL (ref 11.7–17.4)
ARTERIAL BLOOD GAS O2 SAT: 95.1 % (ref 95–98)
ARTERIAL BLOOD GAS PCO2: 46 MM/HG (ref 35–45)
ARTERIAL BLOOD GAS TCO2: 25.1 MMOL/L (ref 22–28)
ARTERIAL PATENCY WRIST A: YES
AST SERPL-CCNC: 68 U/L (ref 17–59)
BACTERIA #/AREA URNS HPF: (no result) /[HPF]
BILIRUB UR-MCNC: NEGATIVE MG/DL
BUN SERPL-MCNC: 22 MG/DL (ref 9–20)
CALCIUM SERPL-MCNC: 8.2 MG/DL (ref 8.4–10.2)
COLOR UR: (no result)
ERYTHROCYTE [DISTWIDTH] IN BLOOD BY AUTOMATED COUNT: 14.1 % (ref 11.5–14.5)
GFR NON-AFRICAN AMERICAN: 58
GLUCOSE UR STRIP-MCNC: 100 MG/DL
HCO3 BLDA-SCNC: 22.8 MMOL/L (ref 21–28)
HGB BLD-MCNC: 12.4 G/DL (ref 12–18)
INHALED O2 CONCENTRATION: 45 %
LEUKOCYTE ESTERASE UR-ACNC: (no result) LEU/UL
Lab: (no result) /HPF (ref 0–2)
MCH RBC QN AUTO: 33.1 PG (ref 27–31)
MCHC RBC AUTO-ENTMCNC: 32 G/DL (ref 33–37)
MCV RBC AUTO: 103.3 FL (ref 80–94)
O2 CAP BLDA-SCNC: 18.3 ML/DL (ref 16–24)
O2 CT BLDA-SCNC: 17.4 ML/DL (ref 15–23)
PH BLDA: 7.32 [PH] (ref 7.35–7.45)
PH UR STRIP: 6 [PH] (ref 5–8)
PLATELET # BLD: 189 K/UL (ref 130–400)
PO2 BLDA: 74 MM/HG (ref 80–100)
PROT UR STRIP-MCNC: >=300 MG/DL
RBC # BLD AUTO: 3.74 MIL/UL (ref 4.4–5.9)
RBC # UR STRIP: (no result) /UL
RBC #/AREA URNS HPF: (no result) /HPF (ref 0–2)
SP GR UR STRIP: 1.02 (ref 1–1.03)
UROBILINOGEN UR-MCNC: 0.2 EU/DL (ref 0–0.2)
WBC # BLD AUTO: 10.5 K/UL (ref 4.8–10.8)

## 2015-12-10 RX ADMIN — ENOXAPARIN SODIUM SCH MG: 40 INJECTION SUBCUTANEOUS at 08:42

## 2015-12-10 RX ADMIN — WATER SCH MLS/HR: 1 INJECTION INTRAMUSCULAR; INTRAVENOUS; SUBCUTANEOUS at 15:33

## 2015-12-10 RX ADMIN — Medication SCH ML: at 08:42

## 2015-12-10 RX ADMIN — NITROGLYCERIN SCH IN: 20 OINTMENT TOPICAL at 15:35

## 2015-12-10 RX ADMIN — WATER SCH MLS/HR: 1 INJECTION INTRAMUSCULAR; INTRAVENOUS; SUBCUTANEOUS at 02:07

## 2015-12-10 RX ADMIN — WATER SCH MLS/HR: 1 INJECTION INTRAMUSCULAR; INTRAVENOUS; SUBCUTANEOUS at 20:41

## 2015-12-10 RX ADMIN — NITROGLYCERIN SCH IN: 20 OINTMENT TOPICAL at 21:02

## 2015-12-10 RX ADMIN — WATER SCH MLS/HR: 1 INJECTION INTRAMUSCULAR; INTRAVENOUS; SUBCUTANEOUS at 08:41

## 2015-12-10 NOTE — RAD
HISTORY:

Mechanical ventilation. Technique: Single view portable erect @ 4:55 

a.m.



COMPARISON:

12/08/2015, 12/09/2015. 



FINDINGS:



LUNGS:

Stable pulmonary edema



PLEURA:

No significant pleural effusion identified, no pneumothorax apparent.



CARDIOVASCULAR:

Normal.



OSSEOUS STRUCTURES:

No significant abnormalities.



VISUALIZED UPPER ABDOMEN:

Normal.



OTHER FINDINGS:

Stable/satisfactory position the endotracheal tube. Satisfactory 

position of nasogastric tube. 



IMPRESSION:

No acute interval changes.  Stable pulmonary edema.

## 2015-12-10 NOTE — CP.PCM.PN
Subjective





- Subjective


Subjective: 


pt remains sedated on propofol and intubated w/ vent support, no distress noted 

but hr in 120s sinus.  no f/c, n/v/d. labs noted





Review of Systems





- Review of Systems


Systems not reviewed;Unavailable: Intubated





- Constitutional


Constitutional: UN





- EENT


Eyes: UNREMARKABLE


Ears: UNREMARKABLE


Nose/Mouth/Throat: UNREMARKABLE





- Cardiovascular


Cardiovascular: As Per HPI, Rapid Heart Rate





- Respiratory


Respiratory: As Per HPI





- Gastrointestinal


Gastrointestinal: UNREMARKABLE





- Genitourinary


Genitourinary: UNREMARKABLE





- Reproductive: Male


Reproductive:Male: UNREMARKABLE





- Musculoskeletal


Musculoskeletal: UNREMARKABLE





- Integumentary


Integumentary: UNREMARKABLE





- Neurological


Neurological: UNREMARKABLE





- Psychiatric


Psychiatric: UNREMARKABLE





- Endocrine


Endocrine: UNREMARKABLE





- Hematologic/Lymphatic


Hematologic: UNREMARKABLE





Objective





- Vital Signs/Intake and Output


Vital Signs (last 24 hours): 


 Vital Signs - 24 hr











  12/09/15 12/09/15 12/09/15





  09:00 10:00 11:00


 


Temperature   


 


Temperature [ 91.3 F L 90.2 F L 31.9 F L





Assessment]   


 


Pulse Rate   


 


Pulse Rate [ 76 70 71





Assessment]   


 


Respiratory   





Rate   


 


Respiratory 19 19 12





Rate [   





Assessment]   


 


Blood Pressure   


 


Blood Pressure 131/95 H 147/112 H 150/116 H





[Assessment]   


 


O2 Sat by Pulse   





Oximetry   














  12/09/15 12/09/15 12/09/15





  12:10 12:42 13:14


 


Temperature   


 


Temperature [ 90.3 F L 92.0 F L 93.2 F L





Assessment]   


 


Pulse Rate   


 


Pulse Rate [ 81 83 88





Assessment]   


 


Respiratory   





Rate   


 


Respiratory 13 13 13





Rate [   





Assessment]   


 


Blood Pressure   


 


Blood Pressure 128/101 H 128/101 H 165/126 H





[Assessment]   


 


O2 Sat by Pulse   





Oximetry   














  12/09/15 12/09/15 12/09/15





  13:53 13:54 14:55


 


Temperature   


 


Temperature [ 94.4 F L  93.9 F L





Assessment]   


 


Pulse Rate   


 


Pulse Rate [ 86  88





Assessment]   


 


Respiratory  17 





Rate   


 


Respiratory 86 H  15





Rate [   





Assessment]   


 


Blood Pressure   


 


Blood Pressure 143/110 H  147/115 H





[Assessment]   


 


O2 Sat by Pulse   





Oximetry   














  12/09/15 12/09/15 12/09/15





  16:00 17:00 17:52


 


Temperature 95.4 F L 94.7 F L 95.0 F L


 


Temperature [ 95.4 F L 94.8 F L 95.0 F L





Assessment]   


 


Pulse Rate 98 H 96 H 98 H


 


Pulse Rate [ 99 H 95 H 98 H





Assessment]   


 


Respiratory 15 15 15





Rate   


 


Respiratory 15 15 15





Rate [   





Assessment]   


 


Blood Pressure 175/127 H 154/98 H 159/104 H


 


Blood Pressure 175/127 H 154/98 H 159/104 H





[Assessment]   


 


O2 Sat by Pulse 100 100 100





Oximetry   














  12/09/15 12/09/15 12/09/15





  18:40 20:00 21:00


 


Temperature 95.6 F L  


 


Temperature [ 95.6 F L 96.4 F L 96.6 F L





Assessment]   


 


Pulse Rate 101 H  


 


Pulse Rate [ 101 H 113 H 108 H





Assessment]   


 


Respiratory 15  





Rate   


 


Respiratory 15 27 H 20





Rate [   





Assessment]   


 


Blood Pressure 156/106 H  


 


Blood Pressure 156/106 H 200/128 H 158/109 H





[Assessment]   


 


O2 Sat by Pulse 100  





Oximetry   














  12/09/15 12/09/15 12/10/15





  22:00 23:00 00:00


 


Temperature   


 


Temperature [ 97 F L 96.9 F L 96.9 F L





Assessment]   


 


Pulse Rate   


 


Pulse Rate [ 113 H 111 H 104 H





Assessment]   


 


Respiratory   





Rate   


 


Respiratory 18 16 15





Rate [   





Assessment]   


 


Blood Pressure   


 


Blood Pressure 148/99 H 131/84 131/68





[Assessment]   


 


O2 Sat by Pulse   





Oximetry   














  12/10/15 12/10/15 12/10/15





  01:00 02:00 03:00


 


Temperature   


 


Temperature [ 96.9 F L 96.9 F L 96.6 F L





Assessment]   


 


Pulse Rate   


 


Pulse Rate [ 114 H 107 H 104 H





Assessment]   


 


Respiratory   





Rate   


 


Respiratory 17 15 15





Rate [   





Assessment]   


 


Blood Pressure   


 


Blood Pressure 143/110 H 121/64 129/86





[Assessment]   


 


O2 Sat by Pulse   





Oximetry   














  12/10/15 12/10/15 12/10/15





  04:00 05:00 06:00


 


Temperature   


 


Temperature [ 97.1 F L 97.6 F 97.9 F





Assessment]   


 


Pulse Rate   


 


Pulse Rate [ 115 H 106 H 115 H





Assessment]   


 


Respiratory   





Rate   


 


Respiratory 15 14 15





Rate [   





Assessment]   


 


Blood Pressure   


 


Blood Pressure 136/91 H 153/103 H 141/93 H





[Assessment]   


 


O2 Sat by Pulse   





Oximetry   











Intake and Output (last 12 hours): 


 Intake & Output











 12/09/15 12/10/15 12/10/15





 18:59 06:59 18:59


 


Intake Total 1547 2330 


 


Output Total 864 270 


 


Balance 683 2060 


 


Intake:   


 


   1600 


 


  Intake, Piggyback 827 730 


 


  Oral 20  


 


  Tube Feeding 50  


 


Output:   


 


  Urine 864 270 


 


    Urethral (Lua) 864 270 


 


  Stool 0 0 


 


Other:   


 


  # Bowel Movements  0 














- Medications


Medications: 


 Current Medications





Acetylcysteine (Acetylcysteine 20%)  2 ml INH RBID MAGDALENA


Enoxaparin Sodium (Lovenox)  40 mg SC DAILY MAGDALENA


   Last Admin: 12/09/15 09:23 Dose:  40 mg


Amiodarone HCl 450 mg/ (Dextrose)  259 mls @ 17.26 mls/hr IVPB .Q15H1M MAGDALENA; 0.5 

MG/MIN


   PRN Reason: Protocol


   Last Admin: 12/09/15 08:16 Dose:  Not Given


Piperacillin Sod/Tazobactam (Sod 3.375 gm/ Sodium Chloride)  100 mls @ 100 mls/

hr IVPB Q6H MAGDALENA


   Last Admin: 12/10/15 02:07 Dose:  100 mls/hr


Vancomycin HCl 1 gm/ Sodium (Chloride)  250 mls @ 125 mls/hr IVPB Q12 MAGDALENA


   Last Admin: 12/09/15 20:00 Dose:  125 mls/hr


Nitroglycerin/Dextrose (Nitroglycerin 50 Mg/250 Ml D5w)  250 mls @ 3 mls/hr IV 

.Q24H ONE; 10 MCG/MIN


   PRN Reason: Protocol


   Stop: 12/10/15 08:39


   Last Admin: 12/09/15 10:25 Dose:  3 mls/hr


Sodium Chloride (Sodium Chloride 0.9%)  1,000 mls @ 150 mls/hr IV .Q6H40M MAGDALENA


   Stop: 12/10/15 08:41


   Last Admin: 12/10/15 00:48 Dose:  150 mls/hr


Propofol (Diprivan)  100 mls @ 2.041 mls/hr IV .Q24H MAGDALENA; 5 MCG/KG/MIN


   PRN Reason: Protocol


   Stop: 12/10/15 13:55


   Last Titration: 12/09/15 22:15 Dose:  35 mcg/kg/min


Insulin Human Regular (Humulin R)  0 units SC Q6H MAGDALENA


   PRN Reason: Protocol


   Last Admin: 12/10/15 06:49 Dose:  Not Given


Morphine Sulfate (Morphine)  4 mg IVP Q2H PRN


   PRN Reason: Pain, moderate (4-7)


   Last Admin: 12/10/15 06:51 Dose:  4 mg


Multivitamins/Vitamin C (Multi-Delyn Liquid)  5 ml PO DAILY [facility]


Pantoprazole Sodium (Protonix Inj)  40 mg IVP DAILY MAGDALENA


   Last Admin: 12/09/15 09:23 Dose:  40 mg











- Labs


Labs (last 24 hours): 


 Laboratory Results - last 24 hr











  12/09/15 12/09/15 12/09/15





  04:05 09:45 09:56


 


WBC   


 


RBC   


 


Hgb   


 


Hct   


 


MCV   


 


MCH   


 


MCHC   


 


RDW   


 


Plt Count   


 


MPV   


 


Neut % (Auto)   


 


Lymph % (Auto)   


 


Mono % (Auto)   


 


Eos % (Auto)   


 


Baso % (Auto)   


 


Neut #   


 


Lymph #   


 


Mono #   


 


Eos #   


 


Baso #   


 


Neutrophils % (Manual)  74  


 


Band Neutrophils %  8 H  


 


Lymphocytes % (Manual)  10 L  


 


Monocytes % (Manual)  6  


 


Plasma Cell % (Manual)  2 H  


 


Platelet Estimate  Normal  


 


Macrocytosis (manual)  Moderate  


 


Tear Drop Cells  Slight  


 


PT   


 


INR   


 


APTT   


 


pCO2   


 


pO2   


 


HCO3   


 


ABG pH   


 


ABG Total CO2   


 


ABG O2 Saturation   


 


ABG O2 Content   


 


ABG Base Excess   


 


ABG Hemoglobin   


 


ABG Carboxyhemoglobin   


 


POC ABG HHb (Measured)   


 


ABG Methemoglobin   


 


ABG O2 Capacity   


 


Chu Test   


 


A-a O2 Difference   


 


Hgb O2 Saturation   


 


FiO2   


 


Blood Gas Comments   


 


Crit Value Called To   


 


Crit Value Read Back   


 


Blood Gas Notified Time   


 


Sodium   


 


Potassium   


 


Chloride   


 


Carbon Dioxide   


 


Anion Gap   


 


BUN   


 


Creatinine   


 


Est GFR ( Amer)   


 


Est GFR (Non-Af Amer)   


 


POC Glucose (mg/dL)    112 H


 


Random Glucose   


 


Hemoglobin A1c  5.0  


 


Lactic Acid   3.5 H 


 


Calcium   


 


Phosphorus   


 


Magnesium   


 


Total Bilirubin   


 


AST   


 


ALT   


 


Alkaline Phosphatase   


 


Troponin I   


 


Total Protein   


 


Albumin   


 


Globulin   


 


Albumin/Globulin Ratio   


 


Urine Color   


 


Urine Appearance   


 


Urine pH   


 


Ur Specific Gravity   


 


Urine Protein   


 


Urine Glucose (UA)   


 


Urine Ketones   


 


Urine Blood   


 


Urine Nitrate   


 


Urine Bilirubin   


 


Urine Urobilinogen   


 


Ur Leukocyte Esterase   


 


Urine RBC   


 


Urine WBC   


 


Ur Epithelial Cells   


 


Urine Bacteria   


 


Coarse Granular Casts   














  12/09/15 12/09/15 12/09/15





  12:30 15:30 15:45


 


WBC   11.2 H 


 


RBC   4.38 L 


 


Hgb   14.3 


 


Hct   45.7 


 


MCV   104.3 H D 


 


MCH   32.7 H 


 


MCHC   31.3 L 


 


RDW   14.2 


 


Plt Count   203 


 


MPV   8.1 


 


Neut % (Auto)   86.9 H 


 


Lymph % (Auto)   5.4 L 


 


Mono % (Auto)   7.6 


 


Eos % (Auto)   0.0 


 


Baso % (Auto)   0.1 


 


Neut #   9.7 H 


 


Lymph #   0.6 L 


 


Mono #   0.9 H 


 


Eos #   0.0 


 


Baso #   0.0 


 


Neutrophils % (Manual)   


 


Band Neutrophils %   


 


Lymphocytes % (Manual)   


 


Monocytes % (Manual)   


 


Plasma Cell % (Manual)   


 


Platelet Estimate   


 


Macrocytosis (manual)   


 


Tear Drop Cells   


 


PT   10.7 


 


INR   1.01 


 


APTT   29.9 


 


pCO2    58 H


 


pO2    22 L*


 


HCO3    20.7 L


 


ABG pH    7.25 L


 


ABG Total CO2    27.2


 


ABG O2 Saturation    34.9 L


 


ABG O2 Content    0 L


 


ABG Base Excess    -3.0 L


 


ABG Hemoglobin    15.1


 


ABG Carboxyhemoglobin    1.0


 


POC ABG HHb (Measured)    64.0 H


 


ABG Methemoglobin    0.8


 


ABG O2 Capacity   


 


Chu Test    Yes


 


A-a O2 Difference    0.0


 


Hgb O2 Saturation    34.3 L


 


FiO2    45.0


 


Blood Gas Comments    162


 


Crit Value Called To    Yes


 


Crit Value Read Back    Dr luann dunn


 


Blood Gas Notified Time    1549


 


Sodium   139 


 


Potassium   3.9 


 


Chloride   102 


 


Carbon Dioxide   25 


 


Anion Gap   16 


 


BUN   26 H 


 


Creatinine   1.5 


 


Est GFR ( Amer)   59 


 


Est GFR (Non-Af Amer)   49 


 


POC Glucose (mg/dL)  120 H  


 


Random Glucose   129 H 


 


Hemoglobin A1c   


 


Lactic Acid   2.9 H 


 


Calcium   9.0 


 


Phosphorus   5.3 H 


 


Magnesium   2.1 


 


Total Bilirubin   0.8 


 


AST   100 H D 


 


ALT   54 


 


Alkaline Phosphatase   124 


 


Troponin I   1.2800 H* 


 


Total Protein   7.5 


 


Albumin   3.7 


 


Globulin   3.7 


 


Albumin/Globulin Ratio   1.0 


 


Urine Color   


 


Urine Appearance   


 


Urine pH   


 


Ur Specific Gravity   


 


Urine Protein   


 


Urine Glucose (UA)   


 


Urine Ketones   


 


Urine Blood   


 


Urine Nitrate   


 


Urine Bilirubin   


 


Urine Urobilinogen   


 


Ur Leukocyte Esterase   


 


Urine RBC   


 


Urine WBC   


 


Ur Epithelial Cells   


 


Urine Bacteria   


 


Coarse Granular Casts   














  12/09/15 12/09/15 12/09/15





  16:10 16:33 20:05


 


WBC   


 


RBC   


 


Hgb   


 


Hct   


 


MCV   


 


MCH   


 


MCHC   


 


RDW   


 


Plt Count   


 


MPV   


 


Neut % (Auto)   


 


Lymph % (Auto)   


 


Mono % (Auto)   


 


Eos % (Auto)   


 


Baso % (Auto)   


 


Neut #   


 


Lymph #   


 


Mono #   


 


Eos #   


 


Baso #   


 


Neutrophils % (Manual)   


 


Band Neutrophils %   


 


Lymphocytes % (Manual)   


 


Monocytes % (Manual)   


 


Plasma Cell % (Manual)   


 


Platelet Estimate   


 


Macrocytosis (manual)   


 


Tear Drop Cells   


 


PT   


 


INR   


 


APTT   


 


pCO2   


 


pO2   


 


HCO3   


 


ABG pH   


 


ABG Total CO2   


 


ABG O2 Saturation   


 


ABG O2 Content   


 


ABG Base Excess   


 


ABG Hemoglobin   


 


ABG Carboxyhemoglobin   


 


POC ABG HHb (Measured)   


 


ABG Methemoglobin   


 


ABG O2 Capacity   


 


Chu Test   


 


A-a O2 Difference   


 


Hgb O2 Saturation   


 


FiO2   


 


Blood Gas Comments   


 


Crit Value Called To   


 


Crit Value Read Back   


 


Blood Gas Notified Time   


 


Sodium   


 


Potassium   


 


Chloride   


 


Carbon Dioxide   


 


Anion Gap   


 


BUN   


 


Creatinine   


 


Est GFR ( Amer)   


 


Est GFR (Non-Af Amer)   


 


POC Glucose (mg/dL)  91  99  101


 


Random Glucose   


 


Hemoglobin A1c   


 


Lactic Acid   


 


Calcium   


 


Phosphorus   


 


Magnesium   


 


Total Bilirubin   


 


AST   


 


ALT   


 


Alkaline Phosphatase   


 


Troponin I   


 


Total Protein   


 


Albumin   


 


Globulin   


 


Albumin/Globulin Ratio   


 


Urine Color   


 


Urine Appearance   


 


Urine pH   


 


Ur Specific Gravity   


 


Urine Protein   


 


Urine Glucose (UA)   


 


Urine Ketones   


 


Urine Blood   


 


Urine Nitrate   


 


Urine Bilirubin   


 


Urine Urobilinogen   


 


Ur Leukocyte Esterase   


 


Urine RBC   


 


Urine WBC   


 


Ur Epithelial Cells   


 


Urine Bacteria   


 


Coarse Granular Casts   














  12/10/15 12/10/15 12/10/15





  00:36 01:20 04:00


 


WBC    10.5


 


RBC    3.74 L


 


Hgb    12.4


 


Hct    38.6


 


MCV    103.3 H


 


MCH    33.1 H


 


MCHC    32.0 L


 


RDW    14.1


 


Plt Count    189


 


MPV   


 


Neut % (Auto)   


 


Lymph % (Auto)   


 


Mono % (Auto)   


 


Eos % (Auto)   


 


Baso % (Auto)   


 


Neut #   


 


Lymph #   


 


Mono #   


 


Eos #   


 


Baso #   


 


Neutrophils % (Manual)   


 


Band Neutrophils %   


 


Lymphocytes % (Manual)   


 


Monocytes % (Manual)   


 


Plasma Cell % (Manual)   


 


Platelet Estimate   


 


Macrocytosis (manual)   


 


Tear Drop Cells   


 


PT   


 


INR   


 


APTT   


 


pCO2   


 


pO2   


 


HCO3   


 


ABG pH   


 


ABG Total CO2   


 


ABG O2 Saturation   


 


ABG O2 Content   


 


ABG Base Excess   


 


ABG Hemoglobin   


 


ABG Carboxyhemoglobin   


 


POC ABG HHb (Measured)   


 


ABG Methemoglobin   


 


ABG O2 Capacity   


 


Chu Test   


 


A-a O2 Difference   


 


Hgb O2 Saturation   


 


FiO2   


 


Blood Gas Comments   


 


Crit Value Called To   


 


Crit Value Read Back   


 


Blood Gas Notified Time   


 


Sodium   


 


Potassium   


 


Chloride   


 


Carbon Dioxide   


 


Anion Gap   


 


BUN   


 


Creatinine   


 


Est GFR ( Amer)   


 


Est GFR (Non-Af Amer)   


 


POC Glucose (mg/dL)  106  


 


Random Glucose   


 


Hemoglobin A1c   


 


Lactic Acid   


 


Calcium   


 


Phosphorus   


 


Magnesium   


 


Total Bilirubin   


 


AST   


 


ALT   


 


Alkaline Phosphatase   


 


Troponin I   


 


Total Protein   


 


Albumin   


 


Globulin   


 


Albumin/Globulin Ratio   


 


Urine Color   Light yellow 


 


Urine Appearance   Sl cloudy 


 


Urine pH   6.0 


 


Ur Specific Gravity   1.025 


 


Urine Protein   >=300 H 


 


Urine Glucose (UA)   100 


 


Urine Ketones   Negative 


 


Urine Blood   Moderate H 


 


Urine Nitrate   Negative 


 


Urine Bilirubin   Negative 


 


Urine Urobilinogen   0.2 


 


Ur Leukocyte Esterase   Trace H 


 


Urine RBC   0 - 2 


 


Urine WBC   5 - 10 


 


Ur Epithelial Cells   1 - 3 


 


Urine Bacteria   Mod 


 


Coarse Granular Casts   Trace H 














  12/10/15 12/10/15 12/10/15





  04:10 04:21 06:18


 


WBC   


 


RBC   


 


Hgb   


 


Hct   


 


MCV   


 


MCH   


 


MCHC   


 


RDW   


 


Plt Count   


 


MPV   


 


Neut % (Auto)   


 


Lymph % (Auto)   


 


Mono % (Auto)   


 


Eos % (Auto)   


 


Baso % (Auto)   


 


Neut #   


 


Lymph #   


 


Mono #   


 


Eos #   


 


Baso #   


 


Neutrophils % (Manual)   


 


Band Neutrophils %   


 


Lymphocytes % (Manual)   


 


Monocytes % (Manual)   


 


Plasma Cell % (Manual)   


 


Platelet Estimate   


 


Macrocytosis (manual)   


 


Tear Drop Cells   


 


PT   


 


INR   


 


APTT   


 


pCO2    46 H


 


pO2    74 L


 


HCO3    22.8


 


ABG pH    7.32 L


 


ABG Total CO2    25.1


 


ABG O2 Saturation    95.1


 


ABG O2 Content    17.4


 


ABG Base Excess    -2.6 L


 


ABG Hemoglobin    13.3


 


ABG Carboxyhemoglobin    1.5


 


POC ABG HHb (Measured)    4.8


 


ABG Methemoglobin    0.9


 


ABG O2 Capacity    18.3


 


Chu Test    Yes


 


A-a O2 Difference    189.0


 


Hgb O2 Saturation    92.8 L


 


FiO2    45.0


 


Blood Gas Comments   


 


Crit Value Called To   


 


Crit Value Read Back   


 


Blood Gas Notified Time   


 


Sodium  139  


 


Potassium  4.1  


 


Chloride  107  


 


Carbon Dioxide  22  


 


Anion Gap  14  


 


BUN  22 H  


 


Creatinine  1.3  


 


Est GFR ( Amer)  > 60  


 


Est GFR (Non-Af Amer)  58  


 


POC Glucose (mg/dL)   99 


 


Random Glucose  110  


 


Hemoglobin A1c   


 


Lactic Acid   


 


Calcium  8.2 L  


 


Phosphorus  5.0 H  


 


Magnesium  1.9  


 


Total Bilirubin  0.6  


 


AST  68 H D  


 


ALT  44  


 


Alkaline Phosphatase  96  


 


Troponin I   


 


Total Protein  6.4  


 


Albumin  3.1 L  


 


Globulin  3.3  


 


Albumin/Globulin Ratio  0.9 L  


 


Urine Color   


 


Urine Appearance   


 


Urine pH   


 


Ur Specific Gravity   


 


Urine Protein   


 


Urine Glucose (UA)   


 


Urine Ketones   


 


Urine Blood   


 


Urine Nitrate   


 


Urine Bilirubin   


 


Urine Urobilinogen   


 


Ur Leukocyte Esterase   


 


Urine RBC   


 


Urine WBC   


 


Ur Epithelial Cells   


 


Urine Bacteria   


 


Coarse Granular Casts   














- Constitutional


Appears: Non-toxic, No Acute Distress, Chronically Ill





- Head Exam


Head Exam: ATRAUMATIC, NORMAL INSPECTION, NORMOCEPHALIC





- Eye Exam


Eye Exam: EOMI





- Respiratory Exam


Respiratory Exam: Clear to PA & Lateral


Additional comments: 


vented





- Cardiovascular Exam


Cardiovascular Exam: REGULAR RHYTHM, RRR





- GI/Abdominal Exam


GI & Abdominal Exam: Normal Bowel Sounds, Soft, Unremarkable





- Extremities Exam


Extremities exam: normal capillary refill, normal inspection, pedal pulses 

present





- Skin


Skin Exam: Dry, Intact, Normal Color, Warm





Assessment/Plan


(1) Cardiac arrest


Current Visit: Yes   Status: Acute


Comment: s/p acls proceudres, ST today


cont amiodarone


echo


cardio consult


trops


cath when stable


cont cooling protocol


cont sedation, ivf








(2) DVT prophylaxis


Current Visit: Yes   Status: Acute


Comment: lovenox








(3) Scrotal edema


Current Visit: Yes   Status: Acute


Comment: scrotal us


urology consult








(4) History of seizure


Current Visit: Yes   Status: Acute


Comment: neuro consult








(5) Diabetes mellitus with hyperglycemia


Current Visit: Yes   Status: Acute


Comment: riss per protocol

## 2015-12-10 NOTE — CP.CCUPN
CCU Subjective





- Physician Review


Subjective (Free Text): 


   ***INTENSIVIST PROGRESS NOTE***


Patient examined, interim events reviewed:





Has completed warming phase  phase of Therapeutic Hypothermia at 5AM. He 

remains sedated on Propofol and on vasoactive meds with IV NTG to maintain 

elevated BP levels near or below MAP of 100.  no recurrent BETTINA off Amiodarone. 

Breathing 19 on AC 14, TV 600ml, Peep 5, 55% oxygen and Ve= 16.6, PPP= 26, SPO2 

100%. Last 24 Hour I/Os= 3877/1134. Sedation vacation done and noted 

oincreased agiataion, but no repsonse to verbal commands nor otherwise 

interactive in any meaningful manner.





No other distress noted: 


EXAM-


HEENT: no icterus, no nystagmus, pupils 2 mm, no gaze preference,  no nystagmus


NECK: no visible JVD, supple, carotids equal upstroke bilat/no bruits


CHEST: decreased BS bases, no wheezes audible


HEART:  regular, distant, S1S2, no murmur audible, no rubs.


ABD:  soft, no increased distention, no focal tenderness,  no HSM. BS hypoactive

, 


EXT:    no edema, no peripheral/ digital cyanosis, no palpable cords, distal 

pulses intact and symmetrical


NEURO:  +tone, plantars no response. Otherwise flaccid legs. 


SKIN:   no rashes





LABS


WBC= 10.5


HGB= 12.4


PLTs = 189K


Na= 139


K=  4.1


HCO3= 22


BUN/Cr=  22/1.3


BS= 110


7.32/46/74


CXR: ETT position OK above ru, multi lobular interstitial changes: entire 

left lung, R hilar area and RUL (my interp).





Assessment: 


1.   Cardiac Arrest with resuscitation from VT. 


2.   Post-Resuscitation Therapeutic Hypothermia-completed


3.   Anoxic Encephalopathy


4.     Aspiration Pneumonitis


5.   Scrotal swelling, r/o infection/inflammation/ mass





PLAN:


1.    Continue attempts to wean of anxiolytics in order to assess for any 

improvement in neuromental status.


2.    Consider EEG


3.    No MV weans feasible at this time.


4.    Empiric abx coverage


5.    Enteral tube feedings, IV H2B's, Lovenox prx.


6.    IV NTG to enterally administered antihypertensives.


7.    Scrotal US study / Urology eval





Time spent with this patient did not overlap with any other provider's medical 

or critical care time. Additionally the code selected for the services rendered 

in this note includes the time spent:  looking to talk to the patients family, 

associated physicians and reviewing hospital data/results not listed here which 

extended to a total of 40 minutes.














CCU Objective





- Vital Signs / Intake & Output


Vital Signs (Last 4 hours): 


Vital Signs











  Temp Pulse Resp BP Pulse Ox


 


 12/10/15 10:00  99.4 F  120 H  14  129/74  100


 


 12/10/15 09:00  99.1 F  122 H  14  135/87  100


 


 12/10/15 08:00  98.1 F  124 H  13  135/87  100











Intake and Output (Last 8hrs): 


 Intake & Output











 12/09/15 12/10/15 12/10/15





 22:59 06:59 14:59


 


Intake Total 1311 1632 924


 


Output Total 414 225 


 


Balance 897 1407 924


 


Intake:   


 


   1200 394


 


  Intake, Piggyback 461 432 350


 


  Oral   80


 


  Tube Feeding   100


 


Output:   


 


  Urine 414 225 


 


    Urethral (Lua) 414 225 


 


  Stool 0  


 


Other:   


 


  # Bowel Movements 0 0 














- Medications


Active Medications: 


Active Medications











Generic Name Dose Route Start Last Admin





  Trade Name Freq  PRN Reason Stop Dose Admin


 


Acetylcysteine  2 ml 12/10/15 08:00  





  Acetylcysteine 20%  INH   





  RBID MAGDALENA   


 


Enoxaparin Sodium  40 mg 12/09/15 09:00 12/10/15 08:42





  Lovenox  SC   40 mg





  DAILY MAGDALENA   Administration


 


Amiodarone HCl 450 mg/  259 mls @ 17.26 mls/hr 12/08/15 16:15 12/09/15 08:16





  Dextrose  IVPB   Not Given





  .Q15H1M MAGDALENA   





  Protocol   





  0.5 MG/MIN   


 


Piperacillin Sod/Tazobactam  100 mls @ 100 mls/hr 12/08/15 14:30 12/10/15 08:41





  Sod 3.375 gm/ Sodium Chloride  IVPB   100 mls/hr





  Q6H MAGDALENA   Administration


 


Vancomycin HCl 1 gm/ Sodium  250 mls @ 125 mls/hr 12/08/15 18:45 12/10/15 08:42





  Chloride  IVPB   125 mls/hr





  Q12 MAGDALENA   Administration


 


Propofol  100 mls @ 2.041 mls/hr 12/09/15 14:00 12/09/15 22:15





  Diprivan  IV 12/10/15 13:55  35 mcg/kg/min





  .Q24H MAGDALENA   Titration





  Protocol   





  5 MCG/KG/MIN   


 


Insulin Human Regular  0 units 12/08/15 15:45 12/10/15 06:49





  Humulin R  SC   Not Given





  Q6H MAGDALENA   





  Protocol   


 


Morphine Sulfate  4 mg 12/09/15 13:10 12/10/15 06:51





  Morphine  IVP   4 mg





  Q2H PRN   Administration





  Pain, moderate (4-7)   


 


Multivitamins/Vitamin C  5 ml 12/10/15 09:00 12/10/15 08:42





  Multi-Delyn Liquid  PO   5 ml





  DAILY MAGDALENA   Administration


 


Pantoprazole Sodium  40 mg 12/09/15 09:00 12/10/15 08:42





  Protonix Inj  IVP   40 mg





  DAILY MAGDALENA   Administration














- Patient Studies


Lab Studies: 


 Microbiology Studies











 12/08/15 11:00 Urine Culture - Final





 Urine,Catheterized    Klebsiella Pneumoniae Ssp Pneu


 


 12/08/15 15:10 Blood Culture - Preliminary





 Blood    NO GROWTH AFTER 24 HOURS


 


 12/08/15 12:00 Blood Culture - Preliminary





 Blood-Venous    NO GROWTH AFTER 24 HOURS


 


 12/08/15 12:00 Blood Culture - Preliminary





 Blood-Venous    NO GROWTH AFTER 24 HOURS








 Lab Studies











  12/10/15 12/10/15 12/10/15 Range/Units





  06:18 04:21 04:10 


 


WBC     (4.8-10.8)  K/uL


 


RBC     (4.40-5.90)  Mil/uL


 


Hgb     (12.0-18.0)  g/dL


 


Hct     (35.0-51.0)  %


 


MCV     (80.0-94.0)  fl


 


MCH     (27.0-31.0)  pg


 


MCHC     (33.0-37.0)  g/dL


 


RDW     (11.5-14.5)  %


 


Plt Count     (130-400)  K/uL


 


MPV     (7.2-11.7)  fl


 


Neut % (Auto)     (50.0-75.0)  %


 


Lymph % (Auto)     (20.0-40.0)  %


 


Mono % (Auto)     (0.0-10.0)  %


 


Eos % (Auto)     (0.0-4.0)  %


 


Baso % (Auto)     (0.0-2.0)  %


 


Neut #     (1.8-7.0)  K/uL


 


Lymph #     (1.0-4.3)  K/uL


 


Mono #     (0.0-0.8)  K/uL


 


Eos #     (0.0-0.7)  K/uL


 


Baso #     (0.0-0.2)  K/uL


 


Neutrophils % (Manual)     (42-75)  %


 


Band Neutrophils %     (0-0)  %


 


Lymphocytes % (Manual)     (20-50)  %


 


Monocytes % (Manual)     (0-10)  %


 


Plasma Cell % (Manual)     (0-0)  


 


Platelet Estimate     (NORMAL)  


 


Macrocytosis (manual)     


 


Tear Drop Cells     


 


PT     (9.4-12.0)  SECONDS


 


INR     (0.89-1.20)  


 


APTT     (23.1-32.0)  SECONDS


 


pCO2  46 H    (35-45)  mm/Hg


 


pO2  74 L    ()  mm/Hg


 


HCO3  22.8    (21-28)  mmol/L


 


ABG pH  7.32 L    (7.35-7.45)  


 


ABG Total CO2  25.1    (22-28)  mmol/L


 


ABG O2 Saturation  95.1    (95-98)  %


 


ABG O2 Content  17.4    (15-23)  ML/dL


 


ABG Base Excess  -2.6 L    (-2.0-3.0)  mmol/L


 


ABG Hemoglobin  13.3    (11.7-17.4)  g/dL


 


ABG Carboxyhemoglobin  1.5    (0.5-1.5)  %


 


POC ABG HHb (Measured)  4.8    (0.0-5.0)  %


 


ABG Methemoglobin  0.9    (0.0-3.0)  %


 


ABG O2 Capacity  18.3    (16-24)  mL/dL


 


Chu Test  Yes    


 


A-a O2 Difference  189.0    mm/Hg


 


Hgb O2 Saturation  92.8 L    (95.0-98.0)  %


 


FiO2  45.0    %


 


Blood Gas Comments     


 


Crit Value Called To     


 


Crit Value Read Back     


 


Blood Gas Notified Time     


 


Sodium    139  (132-148)  mmol/l


 


Potassium    4.1  (3.6-5.0)  MMOL/L


 


Chloride    107  ()  mmol/L


 


Carbon Dioxide    22  (22-30)  mmol/L


 


Anion Gap    14  (10-20)  


 


BUN    22 H  (9-20)  mg/dl


 


Creatinine    1.3  (0.8-1.5)  mg/dL


 


Est GFR (African Amer)    > 60  


 


Est GFR (Non-Af Amer)    58  


 


POC Glucose (mg/dL)   99   ()  mg/dL


 


Random Glucose    110  ()  mg/dL


 


Hemoglobin A1c     (4.2-6.5)  %


 


Lactic Acid     (0.7-2.1)  MMOL/L


 


Calcium    8.2 L  (8.4-10.2)  mg/dL


 


Phosphorus    5.0 H  (2.5-4.5)  mg/dl


 


Magnesium    1.9  (1.6-2.3)  MG/DL


 


Total Bilirubin    0.6  (0.2-1.3)  mg/dl


 


AST    68 H D  (17-59)  U/L


 


ALT    44  (21-72)  U/L


 


Alkaline Phosphatase    96  ()  U/L


 


Troponin I     (0.00-0.120)  ng/mL


 


Total Protein    6.4  (6.3-8.2)  G/DL


 


Albumin    3.1 L  (3.5-5.0)  g/dL


 


Globulin    3.3  (2.2-3.9)  gm/dL


 


Albumin/Globulin Ratio    0.9 L  (1.0-2.1)  


 


Urine Color     (YELLOW)  


 


Urine Appearance     (CLEAR)  


 


Urine pH     (5.0-8.0)  


 


Ur Specific Gravity     (1.001-1.030)  


 


Urine Protein     (NEGATIVE)  mg/dL


 


Urine Glucose (UA)     (Normal)  mg/dL


 


Urine Ketones     (NEGATIVE)  mg/dL


 


Urine Blood     (NEGATIVE)  


 


Urine Nitrate     (NEGATIVE)  


 


Urine Bilirubin     (NEGATIVE)  


 


Urine Urobilinogen     (0.0-0.2)  EU/dL


 


Ur Leukocyte Esterase     (NEGATIVE)  Miranda/uL


 


Urine RBC     (0-2)  /hpf


 


Urine WBC     (0-6)  /hpf


 


Ur Epithelial Cells     (0-5)  /hpf


 


Urine Bacteria     (NEG)  


 


Coarse Granular Casts     (0-2)  /hpf














  12/10/15 12/10/15 12/10/15 Range/Units





  04:00 01:20 00:36 


 


WBC  10.5    (4.8-10.8)  K/uL


 


RBC  3.74 L    (4.40-5.90)  Mil/uL


 


Hgb  12.4    (12.0-18.0)  g/dL


 


Hct  38.6    (35.0-51.0)  %


 


MCV  103.3 H    (80.0-94.0)  fl


 


MCH  33.1 H    (27.0-31.0)  pg


 


MCHC  32.0 L    (33.0-37.0)  g/dL


 


RDW  14.1    (11.5-14.5)  %


 


Plt Count  189    (130-400)  K/uL


 


MPV     (7.2-11.7)  fl


 


Neut % (Auto)     (50.0-75.0)  %


 


Lymph % (Auto)     (20.0-40.0)  %


 


Mono % (Auto)     (0.0-10.0)  %


 


Eos % (Auto)     (0.0-4.0)  %


 


Baso % (Auto)     (0.0-2.0)  %


 


Neut #     (1.8-7.0)  K/uL


 


Lymph #     (1.0-4.3)  K/uL


 


Mono #     (0.0-0.8)  K/uL


 


Eos #     (0.0-0.7)  K/uL


 


Baso #     (0.0-0.2)  K/uL


 


Neutrophils % (Manual)     (42-75)  %


 


Band Neutrophils %     (0-0)  %


 


Lymphocytes % (Manual)     (20-50)  %


 


Monocytes % (Manual)     (0-10)  %


 


Plasma Cell % (Manual)     (0-0)  


 


Platelet Estimate     (NORMAL)  


 


Macrocytosis (manual)     


 


Tear Drop Cells     


 


PT     (9.4-12.0)  SECONDS


 


INR     (0.89-1.20)  


 


APTT     (23.1-32.0)  SECONDS


 


pCO2     (35-45)  mm/Hg


 


pO2     ()  mm/Hg


 


HCO3     (21-28)  mmol/L


 


ABG pH     (7.35-7.45)  


 


ABG Total CO2     (22-28)  mmol/L


 


ABG O2 Saturation     (95-98)  %


 


ABG O2 Content     (15-23)  ML/dL


 


ABG Base Excess     (-2.0-3.0)  mmol/L


 


ABG Hemoglobin     (11.7-17.4)  g/dL


 


ABG Carboxyhemoglobin     (0.5-1.5)  %


 


POC ABG HHb (Measured)     (0.0-5.0)  %


 


ABG Methemoglobin     (0.0-3.0)  %


 


ABG O2 Capacity     (16-24)  mL/dL


 


Chu Test     


 


A-a O2 Difference     mm/Hg


 


Hgb O2 Saturation     (95.0-98.0)  %


 


FiO2     %


 


Blood Gas Comments     


 


Crit Value Called To     


 


Crit Value Read Back     


 


Blood Gas Notified Time     


 


Sodium     (132-148)  mmol/l


 


Potassium     (3.6-5.0)  MMOL/L


 


Chloride     ()  mmol/L


 


Carbon Dioxide     (22-30)  mmol/L


 


Anion Gap     (10-20)  


 


BUN     (9-20)  mg/dl


 


Creatinine     (0.8-1.5)  mg/dL


 


Est GFR (African Amer)     


 


Est GFR (Non-Af Amer)     


 


POC Glucose (mg/dL)    106  ()  mg/dL


 


Random Glucose     ()  mg/dL


 


Hemoglobin A1c     (4.2-6.5)  %


 


Lactic Acid     (0.7-2.1)  MMOL/L


 


Calcium     (8.4-10.2)  mg/dL


 


Phosphorus     (2.5-4.5)  mg/dl


 


Magnesium     (1.6-2.3)  MG/DL


 


Total Bilirubin     (0.2-1.3)  mg/dl


 


AST     (17-59)  U/L


 


ALT     (21-72)  U/L


 


Alkaline Phosphatase     ()  U/L


 


Troponin I     (0.00-0.120)  ng/mL


 


Total Protein     (6.3-8.2)  G/DL


 


Albumin     (3.5-5.0)  g/dL


 


Globulin     (2.2-3.9)  gm/dL


 


Albumin/Globulin Ratio     (1.0-2.1)  


 


Urine Color   Light yellow   (YELLOW)  


 


Urine Appearance   Sl cloudy   (CLEAR)  


 


Urine pH   6.0   (5.0-8.0)  


 


Ur Specific Gravity   1.025   (1.001-1.030)  


 


Urine Protein   >=300 H   (NEGATIVE)  mg/dL


 


Urine Glucose (UA)   100   (Normal)  mg/dL


 


Urine Ketones   Negative   (NEGATIVE)  mg/dL


 


Urine Blood   Moderate H   (NEGATIVE)  


 


Urine Nitrate   Negative   (NEGATIVE)  


 


Urine Bilirubin   Negative   (NEGATIVE)  


 


Urine Urobilinogen   0.2   (0.0-0.2)  EU/dL


 


Ur Leukocyte Esterase   Trace H   (NEGATIVE)  Miranda/uL


 


Urine RBC   0 - 2   (0-2)  /hpf


 


Urine WBC   5 - 10   (0-6)  /hpf


 


Ur Epithelial Cells   1 - 3   (0-5)  /hpf


 


Urine Bacteria   Mod   (NEG)  


 


Coarse Granular Casts   Trace H   (0-2)  /hpf














  12/09/15 12/09/15 12/09/15 Range/Units





  20:05 16:33 16:10 


 


WBC     (4.8-10.8)  K/uL


 


RBC     (4.40-5.90)  Mil/uL


 


Hgb     (12.0-18.0)  g/dL


 


Hct     (35.0-51.0)  %


 


MCV     (80.0-94.0)  fl


 


MCH     (27.0-31.0)  pg


 


MCHC     (33.0-37.0)  g/dL


 


RDW     (11.5-14.5)  %


 


Plt Count     (130-400)  K/uL


 


MPV     (7.2-11.7)  fl


 


Neut % (Auto)     (50.0-75.0)  %


 


Lymph % (Auto)     (20.0-40.0)  %


 


Mono % (Auto)     (0.0-10.0)  %


 


Eos % (Auto)     (0.0-4.0)  %


 


Baso % (Auto)     (0.0-2.0)  %


 


Neut #     (1.8-7.0)  K/uL


 


Lymph #     (1.0-4.3)  K/uL


 


Mono #     (0.0-0.8)  K/uL


 


Eos #     (0.0-0.7)  K/uL


 


Baso #     (0.0-0.2)  K/uL


 


Neutrophils % (Manual)     (42-75)  %


 


Band Neutrophils %     (0-0)  %


 


Lymphocytes % (Manual)     (20-50)  %


 


Monocytes % (Manual)     (0-10)  %


 


Plasma Cell % (Manual)     (0-0)  


 


Platelet Estimate     (NORMAL)  


 


Macrocytosis (manual)     


 


Tear Drop Cells     


 


PT     (9.4-12.0)  SECONDS


 


INR     (0.89-1.20)  


 


APTT     (23.1-32.0)  SECONDS


 


pCO2     (35-45)  mm/Hg


 


pO2     ()  mm/Hg


 


HCO3     (21-28)  mmol/L


 


ABG pH     (7.35-7.45)  


 


ABG Total CO2     (22-28)  mmol/L


 


ABG O2 Saturation     (95-98)  %


 


ABG O2 Content     (15-23)  ML/dL


 


ABG Base Excess     (-2.0-3.0)  mmol/L


 


ABG Hemoglobin     (11.7-17.4)  g/dL


 


ABG Carboxyhemoglobin     (0.5-1.5)  %


 


POC ABG HHb (Measured)     (0.0-5.0)  %


 


ABG Methemoglobin     (0.0-3.0)  %


 


ABG O2 Capacity     (16-24)  mL/dL


 


Chu Test     


 


A-a O2 Difference     mm/Hg


 


Hgb O2 Saturation     (95.0-98.0)  %


 


FiO2     %


 


Blood Gas Comments     


 


Crit Value Called To     


 


Crit Value Read Back     


 


Blood Gas Notified Time     


 


Sodium     (132-148)  mmol/l


 


Potassium     (3.6-5.0)  MMOL/L


 


Chloride     ()  mmol/L


 


Carbon Dioxide     (22-30)  mmol/L


 


Anion Gap     (10-20)  


 


BUN     (9-20)  mg/dl


 


Creatinine     (0.8-1.5)  mg/dL


 


Est GFR (African Amer)     


 


Est GFR (Non-Af Amer)     


 


POC Glucose (mg/dL)  101  99  91  ()  mg/dL


 


Random Glucose     ()  mg/dL


 


Hemoglobin A1c     (4.2-6.5)  %


 


Lactic Acid     (0.7-2.1)  MMOL/L


 


Calcium     (8.4-10.2)  mg/dL


 


Phosphorus     (2.5-4.5)  mg/dl


 


Magnesium     (1.6-2.3)  MG/DL


 


Total Bilirubin     (0.2-1.3)  mg/dl


 


AST     (17-59)  U/L


 


ALT     (21-72)  U/L


 


Alkaline Phosphatase     ()  U/L


 


Troponin I     (0.00-0.120)  ng/mL


 


Total Protein     (6.3-8.2)  G/DL


 


Albumin     (3.5-5.0)  g/dL


 


Globulin     (2.2-3.9)  gm/dL


 


Albumin/Globulin Ratio     (1.0-2.1)  


 


Urine Color     (YELLOW)  


 


Urine Appearance     (CLEAR)  


 


Urine pH     (5.0-8.0)  


 


Ur Specific Gravity     (1.001-1.030)  


 


Urine Protein     (NEGATIVE)  mg/dL


 


Urine Glucose (UA)     (Normal)  mg/dL


 


Urine Ketones     (NEGATIVE)  mg/dL


 


Urine Blood     (NEGATIVE)  


 


Urine Nitrate     (NEGATIVE)  


 


Urine Bilirubin     (NEGATIVE)  


 


Urine Urobilinogen     (0.0-0.2)  EU/dL


 


Ur Leukocyte Esterase     (NEGATIVE)  Miranda/uL


 


Urine RBC     (0-2)  /hpf


 


Urine WBC     (0-6)  /hpf


 


Ur Epithelial Cells     (0-5)  /hpf


 


Urine Bacteria     (NEG)  


 


Coarse Granular Casts     (0-2)  /hpf














  12/09/15 12/09/15 12/09/15 Range/Units





  15:45 15:30 12:30 


 


WBC   11.2 H   (4.8-10.8)  K/uL


 


RBC   4.38 L   (4.40-5.90)  Mil/uL


 


Hgb   14.3   (12.0-18.0)  g/dL


 


Hct   45.7   (35.0-51.0)  %


 


MCV   104.3 H D   (80.0-94.0)  fl


 


MCH   32.7 H   (27.0-31.0)  pg


 


MCHC   31.3 L   (33.0-37.0)  g/dL


 


RDW   14.2   (11.5-14.5)  %


 


Plt Count   203   (130-400)  K/uL


 


MPV   8.1   (7.2-11.7)  fl


 


Neut % (Auto)   86.9 H   (50.0-75.0)  %


 


Lymph % (Auto)   5.4 L   (20.0-40.0)  %


 


Mono % (Auto)   7.6   (0.0-10.0)  %


 


Eos % (Auto)   0.0   (0.0-4.0)  %


 


Baso % (Auto)   0.1   (0.0-2.0)  %


 


Neut #   9.7 H   (1.8-7.0)  K/uL


 


Lymph #   0.6 L   (1.0-4.3)  K/uL


 


Mono #   0.9 H   (0.0-0.8)  K/uL


 


Eos #   0.0   (0.0-0.7)  K/uL


 


Baso #   0.0   (0.0-0.2)  K/uL


 


Neutrophils % (Manual)     (42-75)  %


 


Band Neutrophils %     (0-0)  %


 


Lymphocytes % (Manual)     (20-50)  %


 


Monocytes % (Manual)     (0-10)  %


 


Plasma Cell % (Manual)     (0-0)  


 


Platelet Estimate     (NORMAL)  


 


Macrocytosis (manual)     


 


Tear Drop Cells     


 


PT   10.7   (9.4-12.0)  SECONDS


 


INR   1.01   (0.89-1.20)  


 


APTT   29.9   (23.1-32.0)  SECONDS


 


pCO2  58 H    (35-45)  mm/Hg


 


pO2  22 L*    ()  mm/Hg


 


HCO3  20.7 L    (21-28)  mmol/L


 


ABG pH  7.25 L    (7.35-7.45)  


 


ABG Total CO2  27.2    (22-28)  mmol/L


 


ABG O2 Saturation  34.9 L    (95-98)  %


 


ABG O2 Content  0 L    (15-23)  ML/dL


 


ABG Base Excess  -3.0 L    (-2.0-3.0)  mmol/L


 


ABG Hemoglobin  15.1    (11.7-17.4)  g/dL


 


ABG Carboxyhemoglobin  1.0    (0.5-1.5)  %


 


POC ABG HHb (Measured)  64.0 H    (0.0-5.0)  %


 


ABG Methemoglobin  0.8    (0.0-3.0)  %


 


ABG O2 Capacity     (16-24)  mL/dL


 


Chu Test  Yes    


 


A-a O2 Difference  0.0    mm/Hg


 


Hgb O2 Saturation  34.3 L    (95.0-98.0)  %


 


FiO2  45.0    %


 


Blood Gas Comments  162    


 


Crit Value Called To  Yes    


 


Crit Value Read Back  Dr luann dunn    


 


Blood Gas Notified Time  1549    


 


Sodium   139   (132-148)  mmol/l


 


Potassium   3.9   (3.6-5.0)  MMOL/L


 


Chloride   102   ()  mmol/L


 


Carbon Dioxide   25   (22-30)  mmol/L


 


Anion Gap   16   (10-20)  


 


BUN   26 H   (9-20)  mg/dl


 


Creatinine   1.5   (0.8-1.5)  mg/dL


 


Est GFR ( Amer)   59   


 


Est GFR (Non-Af Amer)   49   


 


POC Glucose (mg/dL)    120 H  ()  mg/dL


 


Random Glucose   129 H   ()  mg/dL


 


Hemoglobin A1c     (4.2-6.5)  %


 


Lactic Acid   2.9 H   (0.7-2.1)  MMOL/L


 


Calcium   9.0   (8.4-10.2)  mg/dL


 


Phosphorus   5.3 H   (2.5-4.5)  mg/dl


 


Magnesium   2.1   (1.6-2.3)  MG/DL


 


Total Bilirubin   0.8   (0.2-1.3)  mg/dl


 


AST   100 H D   (17-59)  U/L


 


ALT   54   (21-72)  U/L


 


Alkaline Phosphatase   124   ()  U/L


 


Troponin I   1.2800 H*   (0.00-0.120)  ng/mL


 


Total Protein   7.5   (6.3-8.2)  G/DL


 


Albumin   3.7   (3.5-5.0)  g/dL


 


Globulin   3.7   (2.2-3.9)  gm/dL


 


Albumin/Globulin Ratio   1.0   (1.0-2.1)  


 


Urine Color     (YELLOW)  


 


Urine Appearance     (CLEAR)  


 


Urine pH     (5.0-8.0)  


 


Ur Specific Gravity     (1.001-1.030)  


 


Urine Protein     (NEGATIVE)  mg/dL


 


Urine Glucose (UA)     (Normal)  mg/dL


 


Urine Ketones     (NEGATIVE)  mg/dL


 


Urine Blood     (NEGATIVE)  


 


Urine Nitrate     (NEGATIVE)  


 


Urine Bilirubin     (NEGATIVE)  


 


Urine Urobilinogen     (0.0-0.2)  EU/dL


 


Ur Leukocyte Esterase     (NEGATIVE)  Miranda/uL


 


Urine RBC     (0-2)  /hpf


 


Urine WBC     (0-6)  /hpf


 


Ur Epithelial Cells     (0-5)  /hpf


 


Urine Bacteria     (NEG)  


 


Coarse Granular Casts     (0-2)  /hpf














  12/09/15 12/09/15 Range/Units





  09:56 04:05 


 


WBC    (4.8-10.8)  K/uL


 


RBC    (4.40-5.90)  Mil/uL


 


Hgb    (12.0-18.0)  g/dL


 


Hct    (35.0-51.0)  %


 


MCV    (80.0-94.0)  fl


 


MCH    (27.0-31.0)  pg


 


MCHC    (33.0-37.0)  g/dL


 


RDW    (11.5-14.5)  %


 


Plt Count    (130-400)  K/uL


 


MPV    (7.2-11.7)  fl


 


Neut % (Auto)    (50.0-75.0)  %


 


Lymph % (Auto)    (20.0-40.0)  %


 


Mono % (Auto)    (0.0-10.0)  %


 


Eos % (Auto)    (0.0-4.0)  %


 


Baso % (Auto)    (0.0-2.0)  %


 


Neut #    (1.8-7.0)  K/uL


 


Lymph #    (1.0-4.3)  K/uL


 


Mono #    (0.0-0.8)  K/uL


 


Eos #    (0.0-0.7)  K/uL


 


Baso #    (0.0-0.2)  K/uL


 


Neutrophils % (Manual)   74  (42-75)  %


 


Band Neutrophils %   8 H  (0-0)  %


 


Lymphocytes % (Manual)   10 L  (20-50)  %


 


Monocytes % (Manual)   6  (0-10)  %


 


Plasma Cell % (Manual)   2 H  (0-0)  


 


Platelet Estimate   Normal  (NORMAL)  


 


Macrocytosis (manual)   Moderate  


 


Tear Drop Cells   Slight  


 


PT    (9.4-12.0)  SECONDS


 


INR    (0.89-1.20)  


 


APTT    (23.1-32.0)  SECONDS


 


pCO2    (35-45)  mm/Hg


 


pO2    ()  mm/Hg


 


HCO3    (21-28)  mmol/L


 


ABG pH    (7.35-7.45)  


 


ABG Total CO2    (22-28)  mmol/L


 


ABG O2 Saturation    (95-98)  %


 


ABG O2 Content    (15-23)  ML/dL


 


ABG Base Excess    (-2.0-3.0)  mmol/L


 


ABG Hemoglobin    (11.7-17.4)  g/dL


 


ABG Carboxyhemoglobin    (0.5-1.5)  %


 


POC ABG HHb (Measured)    (0.0-5.0)  %


 


ABG Methemoglobin    (0.0-3.0)  %


 


ABG O2 Capacity    (16-24)  mL/dL


 


Chu Test    


 


A-a O2 Difference    mm/Hg


 


Hgb O2 Saturation    (95.0-98.0)  %


 


FiO2    %


 


Blood Gas Comments    


 


Crit Value Called To    


 


Crit Value Read Back    


 


Blood Gas Notified Time    


 


Sodium    (132-148)  mmol/l


 


Potassium    (3.6-5.0)  MMOL/L


 


Chloride    ()  mmol/L


 


Carbon Dioxide    (22-30)  mmol/L


 


Anion Gap    (10-20)  


 


BUN    (9-20)  mg/dl


 


Creatinine    (0.8-1.5)  mg/dL


 


Est GFR (African Amer)    


 


Est GFR (Non-Af Amer)    


 


POC Glucose (mg/dL)  112 H   ()  mg/dL


 


Random Glucose    ()  mg/dL


 


Hemoglobin A1c   5.0  (4.2-6.5)  %


 


Lactic Acid    (0.7-2.1)  MMOL/L


 


Calcium    (8.4-10.2)  mg/dL


 


Phosphorus    (2.5-4.5)  mg/dl


 


Magnesium    (1.6-2.3)  MG/DL


 


Total Bilirubin    (0.2-1.3)  mg/dl


 


AST    (17-59)  U/L


 


ALT    (21-72)  U/L


 


Alkaline Phosphatase    ()  U/L


 


Troponin I    (0.00-0.120)  ng/mL


 


Total Protein    (6.3-8.2)  G/DL


 


Albumin    (3.5-5.0)  g/dL


 


Globulin    (2.2-3.9)  gm/dL


 


Albumin/Globulin Ratio    (1.0-2.1)  


 


Urine Color    (YELLOW)  


 


Urine Appearance    (CLEAR)  


 


Urine pH    (5.0-8.0)  


 


Ur Specific Gravity    (1.001-1.030)  


 


Urine Protein    (NEGATIVE)  mg/dL


 


Urine Glucose (UA)    (Normal)  mg/dL


 


Urine Ketones    (NEGATIVE)  mg/dL


 


Urine Blood    (NEGATIVE)  


 


Urine Nitrate    (NEGATIVE)  


 


Urine Bilirubin    (NEGATIVE)  


 


Urine Urobilinogen    (0.0-0.2)  EU/dL


 


Ur Leukocyte Esterase    (NEGATIVE)  Miranda/uL


 


Urine RBC    (0-2)  /hpf


 


Urine WBC    (0-6)  /hpf


 


Ur Epithelial Cells    (0-5)  /hpf


 


Urine Bacteria    (NEG)  


 


Coarse Granular Casts    (0-2)  /hpf








 Laboratory Results - last 24 hr











  12/09/15 12/09/15 12/09/15





  04:05 09:56 12:30


 


WBC   


 


RBC   


 


Hgb   


 


Hct   


 


MCV   


 


MCH   


 


MCHC   


 


RDW   


 


Plt Count   


 


MPV   


 


Neut % (Auto)   


 


Lymph % (Auto)   


 


Mono % (Auto)   


 


Eos % (Auto)   


 


Baso % (Auto)   


 


Neut #   


 


Lymph #   


 


Mono #   


 


Eos #   


 


Baso #   


 


Neutrophils % (Manual)  74  


 


Band Neutrophils %  8 H  


 


Lymphocytes % (Manual)  10 L  


 


Monocytes % (Manual)  6  


 


Plasma Cell % (Manual)  2 H  


 


Platelet Estimate  Normal  


 


Macrocytosis (manual)  Moderate  


 


Tear Drop Cells  Slight  


 


PT   


 


INR   


 


APTT   


 


pCO2   


 


pO2   


 


HCO3   


 


ABG pH   


 


ABG Total CO2   


 


ABG O2 Saturation   


 


ABG O2 Content   


 


ABG Base Excess   


 


ABG Hemoglobin   


 


ABG Carboxyhemoglobin   


 


POC ABG HHb (Measured)   


 


ABG Methemoglobin   


 


ABG O2 Capacity   


 


Chu Test   


 


A-a O2 Difference   


 


Hgb O2 Saturation   


 


FiO2   


 


Blood Gas Comments   


 


Crit Value Called To   


 


Crit Value Read Back   


 


Blood Gas Notified Time   


 


Sodium   


 


Potassium   


 


Chloride   


 


Carbon Dioxide   


 


Anion Gap   


 


BUN   


 


Creatinine   


 


Est GFR ( Amer)   


 


Est GFR (Non-Af Amer)   


 


POC Glucose (mg/dL)   112 H  120 H


 


Random Glucose   


 


Hemoglobin A1c  5.0  


 


Lactic Acid   


 


Calcium   


 


Phosphorus   


 


Magnesium   


 


Total Bilirubin   


 


AST   


 


ALT   


 


Alkaline Phosphatase   


 


Troponin I   


 


Total Protein   


 


Albumin   


 


Globulin   


 


Albumin/Globulin Ratio   


 


Urine Color   


 


Urine Appearance   


 


Urine pH   


 


Ur Specific Gravity   


 


Urine Protein   


 


Urine Glucose (UA)   


 


Urine Ketones   


 


Urine Blood   


 


Urine Nitrate   


 


Urine Bilirubin   


 


Urine Urobilinogen   


 


Ur Leukocyte Esterase   


 


Urine RBC   


 


Urine WBC   


 


Ur Epithelial Cells   


 


Urine Bacteria   


 


Coarse Granular Casts   














  12/09/15 12/09/15 12/09/15





  15:30 15:45 16:10


 


WBC  11.2 H  


 


RBC  4.38 L  


 


Hgb  14.3  


 


Hct  45.7  


 


MCV  104.3 H D  


 


MCH  32.7 H  


 


MCHC  31.3 L  


 


RDW  14.2  


 


Plt Count  203  


 


MPV  8.1  


 


Neut % (Auto)  86.9 H  


 


Lymph % (Auto)  5.4 L  


 


Mono % (Auto)  7.6  


 


Eos % (Auto)  0.0  


 


Baso % (Auto)  0.1  


 


Neut #  9.7 H  


 


Lymph #  0.6 L  


 


Mono #  0.9 H  


 


Eos #  0.0  


 


Baso #  0.0  


 


Neutrophils % (Manual)   


 


Band Neutrophils %   


 


Lymphocytes % (Manual)   


 


Monocytes % (Manual)   


 


Plasma Cell % (Manual)   


 


Platelet Estimate   


 


Macrocytosis (manual)   


 


Tear Drop Cells   


 


PT  10.7  


 


INR  1.01  


 


APTT  29.9  


 


pCO2   58 H 


 


pO2   22 L* 


 


HCO3   20.7 L 


 


ABG pH   7.25 L 


 


ABG Total CO2   27.2 


 


ABG O2 Saturation   34.9 L 


 


ABG O2 Content   0 L 


 


ABG Base Excess   -3.0 L 


 


ABG Hemoglobin   15.1 


 


ABG Carboxyhemoglobin   1.0 


 


POC ABG HHb (Measured)   64.0 H 


 


ABG Methemoglobin   0.8 


 


ABG O2 Capacity   


 


Chu Test   Yes 


 


A-a O2 Difference   0.0 


 


Hgb O2 Saturation   34.3 L 


 


FiO2   45.0 


 


Blood Gas Comments   162 


 


Crit Value Called To   Yes 


 


Crit Value Read Back   Dr luann dunn 


 


Blood Gas Notified Time   1549 


 


Sodium  139  


 


Potassium  3.9  


 


Chloride  102  


 


Carbon Dioxide  25  


 


Anion Gap  16  


 


BUN  26 H  


 


Creatinine  1.5  


 


Est GFR ( Amer)  59  


 


Est GFR (Non-Af Amer)  49  


 


POC Glucose (mg/dL)    91


 


Random Glucose  129 H  


 


Hemoglobin A1c   


 


Lactic Acid  2.9 H  


 


Calcium  9.0  


 


Phosphorus  5.3 H  


 


Magnesium  2.1  


 


Total Bilirubin  0.8  


 


AST  100 H D  


 


ALT  54  


 


Alkaline Phosphatase  124  


 


Troponin I  1.2800 H*  


 


Total Protein  7.5  


 


Albumin  3.7  


 


Globulin  3.7  


 


Albumin/Globulin Ratio  1.0  


 


Urine Color   


 


Urine Appearance   


 


Urine pH   


 


Ur Specific Gravity   


 


Urine Protein   


 


Urine Glucose (UA)   


 


Urine Ketones   


 


Urine Blood   


 


Urine Nitrate   


 


Urine Bilirubin   


 


Urine Urobilinogen   


 


Ur Leukocyte Esterase   


 


Urine RBC   


 


Urine WBC   


 


Ur Epithelial Cells   


 


Urine Bacteria   


 


Coarse Granular Casts   














  12/09/15 12/09/15 12/10/15





  16:33 20:05 00:36


 


WBC   


 


RBC   


 


Hgb   


 


Hct   


 


MCV   


 


MCH   


 


MCHC   


 


RDW   


 


Plt Count   


 


MPV   


 


Neut % (Auto)   


 


Lymph % (Auto)   


 


Mono % (Auto)   


 


Eos % (Auto)   


 


Baso % (Auto)   


 


Neut #   


 


Lymph #   


 


Mono #   


 


Eos #   


 


Baso #   


 


Neutrophils % (Manual)   


 


Band Neutrophils %   


 


Lymphocytes % (Manual)   


 


Monocytes % (Manual)   


 


Plasma Cell % (Manual)   


 


Platelet Estimate   


 


Macrocytosis (manual)   


 


Tear Drop Cells   


 


PT   


 


INR   


 


APTT   


 


pCO2   


 


pO2   


 


HCO3   


 


ABG pH   


 


ABG Total CO2   


 


ABG O2 Saturation   


 


ABG O2 Content   


 


ABG Base Excess   


 


ABG Hemoglobin   


 


ABG Carboxyhemoglobin   


 


POC ABG HHb (Measured)   


 


ABG Methemoglobin   


 


ABG O2 Capacity   


 


Chu Test   


 


A-a O2 Difference   


 


Hgb O2 Saturation   


 


FiO2   


 


Blood Gas Comments   


 


Crit Value Called To   


 


Crit Value Read Back   


 


Blood Gas Notified Time   


 


Sodium   


 


Potassium   


 


Chloride   


 


Carbon Dioxide   


 


Anion Gap   


 


BUN   


 


Creatinine   


 


Est GFR ( Amer)   


 


Est GFR (Non-Af Amer)   


 


POC Glucose (mg/dL)  99  101  106


 


Random Glucose   


 


Hemoglobin A1c   


 


Lactic Acid   


 


Calcium   


 


Phosphorus   


 


Magnesium   


 


Total Bilirubin   


 


AST   


 


ALT   


 


Alkaline Phosphatase   


 


Troponin I   


 


Total Protein   


 


Albumin   


 


Globulin   


 


Albumin/Globulin Ratio   


 


Urine Color   


 


Urine Appearance   


 


Urine pH   


 


Ur Specific Gravity   


 


Urine Protein   


 


Urine Glucose (UA)   


 


Urine Ketones   


 


Urine Blood   


 


Urine Nitrate   


 


Urine Bilirubin   


 


Urine Urobilinogen   


 


Ur Leukocyte Esterase   


 


Urine RBC   


 


Urine WBC   


 


Ur Epithelial Cells   


 


Urine Bacteria   


 


Coarse Granular Casts   














  12/10/15 12/10/15 12/10/15





  01:20 04:00 04:10


 


WBC   10.5 


 


RBC   3.74 L 


 


Hgb   12.4 


 


Hct   38.6 


 


MCV   103.3 H 


 


MCH   33.1 H 


 


MCHC   32.0 L 


 


RDW   14.1 


 


Plt Count   189 


 


MPV   


 


Neut % (Auto)   


 


Lymph % (Auto)   


 


Mono % (Auto)   


 


Eos % (Auto)   


 


Baso % (Auto)   


 


Neut #   


 


Lymph #   


 


Mono #   


 


Eos #   


 


Baso #   


 


Neutrophils % (Manual)   


 


Band Neutrophils %   


 


Lymphocytes % (Manual)   


 


Monocytes % (Manual)   


 


Plasma Cell % (Manual)   


 


Platelet Estimate   


 


Macrocytosis (manual)   


 


Tear Drop Cells   


 


PT   


 


INR   


 


APTT   


 


pCO2   


 


pO2   


 


HCO3   


 


ABG pH   


 


ABG Total CO2   


 


ABG O2 Saturation   


 


ABG O2 Content   


 


ABG Base Excess   


 


ABG Hemoglobin   


 


ABG Carboxyhemoglobin   


 


POC ABG HHb (Measured)   


 


ABG Methemoglobin   


 


ABG O2 Capacity   


 


Chu Test   


 


A-a O2 Difference   


 


Hgb O2 Saturation   


 


FiO2   


 


Blood Gas Comments   


 


Crit Value Called To   


 


Crit Value Read Back   


 


Blood Gas Notified Time   


 


Sodium    139


 


Potassium    4.1


 


Chloride    107


 


Carbon Dioxide    22


 


Anion Gap    14


 


BUN    22 H


 


Creatinine    1.3


 


Est GFR ( Amer)    > 60


 


Est GFR (Non-Af Amer)    58


 


POC Glucose (mg/dL)   


 


Random Glucose    110


 


Hemoglobin A1c   


 


Lactic Acid   


 


Calcium    8.2 L


 


Phosphorus    5.0 H


 


Magnesium    1.9


 


Total Bilirubin    0.6


 


AST    68 H D


 


ALT    44


 


Alkaline Phosphatase    96


 


Troponin I   


 


Total Protein    6.4


 


Albumin    3.1 L


 


Globulin    3.3


 


Albumin/Globulin Ratio    0.9 L


 


Urine Color  Light yellow  


 


Urine Appearance  Sl cloudy  


 


Urine pH  6.0  


 


Ur Specific Gravity  1.025  


 


Urine Protein  >=300 H  


 


Urine Glucose (UA)  100  


 


Urine Ketones  Negative  


 


Urine Blood  Moderate H  


 


Urine Nitrate  Negative  


 


Urine Bilirubin  Negative  


 


Urine Urobilinogen  0.2  


 


Ur Leukocyte Esterase  Trace H  


 


Urine RBC  0 - 2  


 


Urine WBC  5 - 10  


 


Ur Epithelial Cells  1 - 3  


 


Urine Bacteria  Mod  


 


Coarse Granular Casts  Trace H  














  12/10/15 12/10/15





  04:21 06:18


 


WBC  


 


RBC  


 


Hgb  


 


Hct  


 


MCV  


 


MCH  


 


MCHC  


 


RDW  


 


Plt Count  


 


MPV  


 


Neut % (Auto)  


 


Lymph % (Auto)  


 


Mono % (Auto)  


 


Eos % (Auto)  


 


Baso % (Auto)  


 


Neut #  


 


Lymph #  


 


Mono #  


 


Eos #  


 


Baso #  


 


Neutrophils % (Manual)  


 


Band Neutrophils %  


 


Lymphocytes % (Manual)  


 


Monocytes % (Manual)  


 


Plasma Cell % (Manual)  


 


Platelet Estimate  


 


Macrocytosis (manual)  


 


Tear Drop Cells  


 


PT  


 


INR  


 


APTT  


 


pCO2   46 H


 


pO2   74 L


 


HCO3   22.8


 


ABG pH   7.32 L


 


ABG Total CO2   25.1


 


ABG O2 Saturation   95.1


 


ABG O2 Content   17.4


 


ABG Base Excess   -2.6 L


 


ABG Hemoglobin   13.3


 


ABG Carboxyhemoglobin   1.5


 


POC ABG HHb (Measured)   4.8


 


ABG Methemoglobin   0.9


 


ABG O2 Capacity   18.3


 


Chu Test   Yes


 


A-a O2 Difference   189.0


 


Hgb O2 Saturation   92.8 L


 


FiO2   45.0


 


Blood Gas Comments  


 


Crit Value Called To  


 


Crit Value Read Back  


 


Blood Gas Notified Time  


 


Sodium  


 


Potassium  


 


Chloride  


 


Carbon Dioxide  


 


Anion Gap  


 


BUN  


 


Creatinine  


 


Est GFR ( Amer)  


 


Est GFR (Non-Af Amer)  


 


POC Glucose (mg/dL)  99 


 


Random Glucose  


 


Hemoglobin A1c  


 


Lactic Acid  


 


Calcium  


 


Phosphorus  


 


Magnesium  


 


Total Bilirubin  


 


AST  


 


ALT  


 


Alkaline Phosphatase  


 


Troponin I  


 


Total Protein  


 


Albumin  


 


Globulin  


 


Albumin/Globulin Ratio  


 


Urine Color  


 


Urine Appearance  


 


Urine pH  


 


Ur Specific Gravity  


 


Urine Protein  


 


Urine Glucose (UA)  


 


Urine Ketones  


 


Urine Blood  


 


Urine Nitrate  


 


Urine Bilirubin  


 


Urine Urobilinogen  


 


Ur Leukocyte Esterase  


 


Urine RBC  


 


Urine WBC  


 


Ur Epithelial Cells  


 


Urine Bacteria  


 


Coarse Granular Casts  











Fingerstick Blood Sugar Results: 96


Urine Nitrate  


 


Urine Bilirubin  


 


Urine Urobilinogen  


 


Ur Leukocyte Esterase  


 


Urine RBC  


 


Urine WBC  


 


Ur Epithelial Cells  


 


Urine Bacteria  


 


Coarse Granular Casts  











Fingerstick Blood Sugar Results: 96

## 2015-12-10 NOTE — [email or web address].PN
Subjective





- Subjective


Subjective: 


Patient remains intubated.





Review of Systems





- Review of Systems


Systems not reviewed;Unavailable: Intubated





Objective





- Vital Signs/Intake and Output


Vital Signs (last 24 hours): 


 Vital Signs - 24 hr











  12/09/15 12/09/15 12/09/15





  14:55 16:00 17:00


 


Temperature  95.4 F L 94.7 F L


 


Temperature [ 93.9 F L 95.4 F L 94.8 F L





Assessment]   


 


Pulse Rate  98 H 96 H


 


Pulse Rate [ 88 99 H 95 H





Assessment]   


 


Respiratory  15 15





Rate   


 


Respiratory 15 15 15





Rate [   





Assessment]   


 


Blood Pressure  175/127 H 154/98 H


 


Blood Pressure 147/115 H 175/127 H 154/98 H





[Assessment]   


 


O2 Sat by Pulse  100 100





Oximetry   














  12/09/15 12/09/15 12/09/15





  17:52 18:40 20:00


 


Temperature 95.0 F L 95.6 F L 


 


Temperature [ 95.0 F L 95.6 F L 96.4 F L





Assessment]   


 


Pulse Rate 98 H 101 H 


 


Pulse Rate [ 98 H 101 H 113 H





Assessment]   


 


Respiratory 15 15 





Rate   


 


Respiratory 15 15 27 H





Rate [   





Assessment]   


 


Blood Pressure 159/104 H 156/106 H 


 


Blood Pressure 159/104 H 156/106 H 200/128 H





[Assessment]   


 


O2 Sat by Pulse 100 100 





Oximetry   














  12/09/15 12/09/15 12/09/15





  21:00 22:00 23:00


 


Temperature   


 


Temperature [ 96.6 F L 97 F L 96.9 F L





Assessment]   


 


Pulse Rate   


 


Pulse Rate [ 108 H 113 H 111 H





Assessment]   


 


Respiratory   





Rate   


 


Respiratory 20 18 16





Rate [   





Assessment]   


 


Blood Pressure   


 


Blood Pressure 158/109 H 148/99 H 131/84





[Assessment]   


 


O2 Sat by Pulse   





Oximetry   














  12/10/15 12/10/15 12/10/15





  00:00 01:00 02:00


 


Temperature   


 


Temperature [ 96.9 F L 96.9 F L 96.9 F L





Assessment]   


 


Pulse Rate   


 


Pulse Rate [ 104 H 114 H 107 H





Assessment]   


 


Respiratory   





Rate   


 


Respiratory 15 17 15





Rate [   





Assessment]   


 


Blood Pressure   


 


Blood Pressure 131/68 143/110 H 121/64





[Assessment]   


 


O2 Sat by Pulse   





Oximetry   














  12/10/15 12/10/15 12/10/15





  03:00 04:00 05:00


 


Temperature   


 


Temperature [ 96.6 F L 97.1 F L 97.6 F





Assessment]   


 


Pulse Rate   


 


Pulse Rate [ 104 H 115 H 106 H





Assessment]   


 


Respiratory   





Rate   


 


Respiratory 15 15 14





Rate [   





Assessment]   


 


Blood Pressure   


 


Blood Pressure 129/86 136/91 H 153/103 H





[Assessment]   


 


O2 Sat by Pulse   





Oximetry   














  12/10/15 12/10/15 12/10/15





  06:00 08:00 09:00


 


Temperature  98.1 F 99.1 F


 


Temperature [ 97.9 F  





Assessment]   


 


Pulse Rate  124 H 122 H


 


Pulse Rate [ 115 H  





Assessment]   


 


Respiratory  13 14





Rate   


 


Respiratory 15  





Rate [   





Assessment]   


 


Blood Pressure  135/87 135/87


 


Blood Pressure 141/93 H  





[Assessment]   


 


O2 Sat by Pulse  100 100





Oximetry   














  12/10/15 12/10/15 12/10/15





  10:00 11:00 12:00


 


Temperature 99.4 F 99.4 F 100.4 F H


 


Temperature [   





Assessment]   


 


Pulse Rate 120 H 126 H 126 H


 


Pulse Rate [   





Assessment]   


 


Respiratory 14 13 15





Rate   


 


Respiratory   





Rate [   





Assessment]   


 


Blood Pressure 129/74 100/71 104/68


 


Blood Pressure   





[Assessment]   


 


O2 Sat by Pulse 100 100 100





Oximetry   














  12/10/15 12/10/15





  12:11 13:00


 


Temperature  101.2 F H


 


Temperature [  





Assessment]  


 


Pulse Rate 128 H 111 H


 


Pulse Rate [  





Assessment]  


 


Respiratory  14





Rate  


 


Respiratory  





Rate [  





Assessment]  


 


Blood Pressure 132/76 119/80


 


Blood Pressure  





[Assessment]  


 


O2 Sat by Pulse  100





Oximetry  











Intake and Output (last 12 hours): 


 Intake & Output











 12/09/15 12/10/15 12/10/15





 18:59 06:59 18:59


 


Intake Total 1547 2330 1460


 


Output Total 864 270 


 


Balance 683 2060 1460


 


Intake:   


 


   1600 694


 


  Intake, Piggyback 827 730 396


 


  Oral 20  80


 


  Tube Feeding 50  190


 


  Free Water Flush   100


 


Output:   


 


  Urine 864 270 


 


    Urethral (Lua) 864 270 


 


  Stool 0 0 


 


Other:   


 


  # Bowel Movements  0 














- Medications


Medications: 


 Current Medications





Enalapril Maleate (Vasotec)  10 mg PO DAILY [facility]


   Last Admin: 12/10/15 12:11 Dose:  10 mg


Enoxaparin Sodium (Lovenox)  40 mg SC DAILY [facility]


   Last Admin: 12/10/15 08:42 Dose:  40 mg


Famotidine (Pepcid)  20 mg GT BID [facility]


Piperacillin Sod/Tazobactam (Sod 3.375 gm/ Sodium Chloride)  100 mls @ 100 mls/

hr IVPB Q6H [facility]


   Last Admin: 12/10/15 08:41 Dose:  100 mls/hr


Vancomycin HCl 1 gm/ Sodium (Chloride)  250 mls @ 125 mls/hr IVPB Q12 [facility]


   Last Admin: 12/10/15 08:42 Dose:  125 mls/hr


Insulin Human Regular (Humulin R)  0 units SC Q6H [facility]


   PRN Reason: Protocol


   Last Admin: 12/10/15 11:52 Dose:  Not Given


Metoprolol Tartrate (Lopressor)  50 mg PO Q12 [facility]


   Last Admin: 12/10/15 12:11 Dose:  50 mg


Morphine Sulfate (Morphine)  4 mg IVP Q2H PRN


   PRN Reason: Pain, moderate (4-7)


   Last Admin: 12/10/15 06:51 Dose:  4 mg


Multivitamins/Vitamin C (Multi-Delyn Liquid)  5 ml PO DAILY [facility]


   Last Admin: 12/10/15 08:42 Dose:  5 ml


Nitroglycerin (Nitro-Bid 2% Oint)  2 in TOP Q6 [facility]











- Labs


Labs (last 24 hours): 


 Laboratory Results - last 24 hr











  12/09/15 12/09/15 12/09/15





  12:30 15:30 15:45


 


WBC   11.2 H 


 


RBC   4.38 L 


 


Hgb   14.3 


 


Hct   45.7 


 


MCV   104.3 H D 


 


MCH   32.7 H 


 


MCHC   31.3 L 


 


RDW   14.2 


 


Plt Count   203 


 


MPV   8.1 


 


Neut % (Auto)   86.9 H 


 


Lymph % (Auto)   5.4 L 


 


Mono % (Auto)   7.6 


 


Eos % (Auto)   0.0 


 


Baso % (Auto)   0.1 


 


Neut #   9.7 H 


 


Lymph #   0.6 L 


 


Mono #   0.9 H 


 


Eos #   0.0 


 


Baso #   0.0 


 


PT   10.7 


 


INR   1.01 


 


APTT   29.9 


 


pCO2    58 H


 


pO2    22 L*


 


HCO3    20.7 L


 


ABG pH    7.25 L


 


ABG Total CO2    27.2


 


ABG O2 Saturation    34.9 L


 


ABG O2 Content    0 L


 


ABG Base Excess    -3.0 L


 


ABG Hemoglobin    15.1


 


ABG Carboxyhemoglobin    1.0


 


POC ABG HHb (Measured)    64.0 H


 


ABG Methemoglobin    0.8


 


ABG O2 Capacity   


 


Chu Test    Yes


 


A-a O2 Difference    0.0


 


Hgb O2 Saturation    34.3 L


 


FiO2    45.0


 


Blood Gas Comments    162


 


Crit Value Called To    Yes


 


Crit Value Read Back    Dr luann dunn


 


Blood Gas Notified Time    1549


 


Sodium   139 


 


Potassium   3.9 


 


Chloride   102 


 


Carbon Dioxide   25 


 


Anion Gap   16 


 


BUN   26 H 


 


Creatinine   1.5 


 


Est GFR ( Amer)   59 


 


Est GFR (Non-Af Amer)   49 


 


POC Glucose (mg/dL)  120 H  


 


Random Glucose   129 H 


 


Lactic Acid   2.9 H 


 


Calcium   9.0 


 


Phosphorus   5.3 H 


 


Magnesium   2.1 


 


Total Bilirubin   0.8 


 


AST   100 H D 


 


ALT   54 


 


Alkaline Phosphatase   124 


 


Troponin I   1.2800 H* 


 


Total Protein   7.5 


 


Albumin   3.7 


 


Globulin   3.7 


 


Albumin/Globulin Ratio   1.0 


 


Urine Color   


 


Urine Appearance   


 


Urine pH   


 


Ur Specific Gravity   


 


Urine Protein   


 


Urine Glucose (UA)   


 


Urine Ketones   


 


Urine Blood   


 


Urine Nitrate   


 


Urine Bilirubin   


 


Urine Urobilinogen   


 


Ur Leukocyte Esterase   


 


Urine RBC   


 


Urine WBC   


 


Ur Epithelial Cells   


 


Urine Bacteria   


 


Coarse Granular Casts   














  12/09/15 12/09/15 12/09/15





  16:10 16:33 20:05


 


WBC   


 


RBC   


 


Hgb   


 


Hct   


 


MCV   


 


MCH   


 


MCHC   


 


RDW   


 


Plt Count   


 


MPV   


 


Neut % (Auto)   


 


Lymph % (Auto)   


 


Mono % (Auto)   


 


Eos % (Auto)   


 


Baso % (Auto)   


 


Neut #   


 


Lymph #   


 


Mono #   


 


Eos #   


 


Baso #   


 


PT   


 


INR   


 


APTT   


 


pCO2   


 


pO2   


 


HCO3   


 


ABG pH   


 


ABG Total CO2   


 


ABG O2 Saturation   


 


ABG O2 Content   


 


ABG Base Excess   


 


ABG Hemoglobin   


 


ABG Carboxyhemoglobin   


 


POC ABG HHb (Measured)   


 


ABG Methemoglobin   


 


ABG O2 Capacity   


 


Chu Test   


 


A-a O2 Difference   


 


Hgb O2 Saturation   


 


FiO2   


 


Blood Gas Comments   


 


Crit Value Called To   


 


Crit Value Read Back   


 


Blood Gas Notified Time   


 


Sodium   


 


Potassium   


 


Chloride   


 


Carbon Dioxide   


 


Anion Gap   


 


BUN   


 


Creatinine   


 


Est GFR ( Amer)   


 


Est GFR (Non-Af Amer)   


 


POC Glucose (mg/dL)  91  99  101


 


Random Glucose   


 


Lactic Acid   


 


Calcium   


 


Phosphorus   


 


Magnesium   


 


Total Bilirubin   


 


AST   


 


ALT   


 


Alkaline Phosphatase   


 


Troponin I   


 


Total Protein   


 


Albumin   


 


Globulin   


 


Albumin/Globulin Ratio   


 


Urine Color   


 


Urine Appearance   


 


Urine pH   


 


Ur Specific Gravity   


 


Urine Protein   


 


Urine Glucose (UA)   


 


Urine Ketones   


 


Urine Blood   


 


Urine Nitrate   


 


Urine Bilirubin   


 


Urine Urobilinogen   


 


Ur Leukocyte Esterase   


 


Urine RBC   


 


Urine WBC   


 


Ur Epithelial Cells   


 


Urine Bacteria   


 


Coarse Granular Casts   














  12/10/15 12/10/15 12/10/15





  00:36 01:20 04:00


 


WBC    10.5


 


RBC    3.74 L


 


Hgb    12.4


 


Hct    38.6


 


MCV    103.3 H


 


MCH    33.1 H


 


MCHC    32.0 L


 


RDW    14.1


 


Plt Count    189


 


MPV   


 


Neut % (Auto)   


 


Lymph % (Auto)   


 


Mono % (Auto)   


 


Eos % (Auto)   


 


Baso % (Auto)   


 


Neut #   


 


Lymph #   


 


Mono #   


 


Eos #   


 


Baso #   


 


PT   


 


INR   


 


APTT   


 


pCO2   


 


pO2   


 


HCO3   


 


ABG pH   


 


ABG Total CO2   


 


ABG O2 Saturation   


 


ABG O2 Content   


 


ABG Base Excess   


 


ABG Hemoglobin   


 


ABG Carboxyhemoglobin   


 


POC ABG HHb (Measured)   


 


ABG Methemoglobin   


 


ABG O2 Capacity   


 


Chu Test   


 


A-a O2 Difference   


 


Hgb O2 Saturation   


 


FiO2   


 


Blood Gas Comments   


 


Crit Value Called To   


 


Crit Value Read Back   


 


Blood Gas Notified Time   


 


Sodium   


 


Potassium   


 


Chloride   


 


Carbon Dioxide   


 


Anion Gap   


 


BUN   


 


Creatinine   


 


Est GFR ( Amer)   


 


Est GFR (Non-Af Amer)   


 


POC Glucose (mg/dL)  106  


 


Random Glucose   


 


Lactic Acid   


 


Calcium   


 


Phosphorus   


 


Magnesium   


 


Total Bilirubin   


 


AST   


 


ALT   


 


Alkaline Phosphatase   


 


Troponin I   


 


Total Protein   


 


Albumin   


 


Globulin   


 


Albumin/Globulin Ratio   


 


Urine Color   Light yellow 


 


Urine Appearance   Sl cloudy 


 


Urine pH   6.0 


 


Ur Specific Gravity   1.025 


 


Urine Protein   >=300 H 


 


Urine Glucose (UA)   100 


 


Urine Ketones   Negative 


 


Urine Blood   Moderate H 


 


Urine Nitrate   Negative 


 


Urine Bilirubin   Negative 


 


Urine Urobilinogen   0.2 


 


Ur Leukocyte Esterase   Trace H 


 


Urine RBC   0 - 2 


 


Urine WBC   5 - 10 


 


Ur Epithelial Cells   1 - 3 


 


Urine Bacteria   Mod 


 


Coarse Granular Casts   Trace H 














  12/10/15 12/10/15 12/10/15





  04:10 04:21 06:18


 


WBC   


 


RBC   


 


Hgb   


 


Hct   


 


MCV   


 


MCH   


 


MCHC   


 


RDW   


 


Plt Count   


 


MPV   


 


Neut % (Auto)   


 


Lymph % (Auto)   


 


Mono % (Auto)   


 


Eos % (Auto)   


 


Baso % (Auto)   


 


Neut #   


 


Lymph #   


 


Mono #   


 


Eos #   


 


Baso #   


 


PT   


 


INR   


 


APTT   


 


pCO2    46 H


 


pO2    74 L


 


HCO3    22.8


 


ABG pH    7.32 L


 


ABG Total CO2    25.1


 


ABG O2 Saturation    95.1


 


ABG O2 Content    17.4


 


ABG Base Excess    -2.6 L


 


ABG Hemoglobin    13.3


 


ABG Carboxyhemoglobin    1.5


 


POC ABG HHb (Measured)    4.8


 


ABG Methemoglobin    0.9


 


ABG O2 Capacity    18.3


 


Chu Test    Yes


 


A-a O2 Difference    189.0


 


Hgb O2 Saturation    92.8 L


 


FiO2    45.0


 


Blood Gas Comments   


 


Crit Value Called To   


 


Crit Value Read Back   


 


Blood Gas Notified Time   


 


Sodium  139  


 


Potassium  4.1  


 


Chloride  107  


 


Carbon Dioxide  22  


 


Anion Gap  14  


 


BUN  22 H  


 


Creatinine  1.3  


 


Est GFR ( Amer)  > 60  


 


Est GFR (Non-Af Amer)  58  


 


POC Glucose (mg/dL)   99 


 


Random Glucose  110  


 


Lactic Acid   


 


Calcium  8.2 L  


 


Phosphorus  5.0 H  


 


Magnesium  1.9  


 


Total Bilirubin  0.6  


 


AST  68 H D  


 


ALT  44  


 


Alkaline Phosphatase  96  


 


Troponin I   


 


Total Protein  6.4  


 


Albumin  3.1 L  


 


Globulin  3.3  


 


Albumin/Globulin Ratio  0.9 L  


 


Urine Color   


 


Urine Appearance   


 


Urine pH   


 


Ur Specific Gravity   


 


Urine Protein   


 


Urine Glucose (UA)   


 


Urine Ketones   


 


Urine Blood   


 


Urine Nitrate   


 


Urine Bilirubin   


 


Urine Urobilinogen   


 


Ur Leukocyte Esterase   


 


Urine RBC   


 


Urine WBC   


 


Ur Epithelial Cells   


 


Urine Bacteria   


 


Coarse Granular Casts   














  12/10/15





  10:00


 


WBC 


 


RBC 


 


Hgb 


 


Hct 


 


MCV 


 


MCH 


 


MCHC 


 


RDW 


 


Plt Count 


 


MPV 


 


Neut % (Auto) 


 


Lymph % (Auto) 


 


Mono % (Auto) 


 


Eos % (Auto) 


 


Baso % (Auto) 


 


Neut # 


 


Lymph # 


 


Mono # 


 


Eos # 


 


Baso # 


 


PT 


 


INR 


 


APTT 


 


pCO2 


 


pO2 


 


HCO3 


 


ABG pH 


 


ABG Total CO2 


 


ABG O2 Saturation 


 


ABG O2 Content 


 


ABG Base Excess 


 


ABG Hemoglobin 


 


ABG Carboxyhemoglobin 


 


POC ABG HHb (Measured) 


 


ABG Methemoglobin 


 


ABG O2 Capacity 


 


Chu Test 


 


A-a O2 Difference 


 


Hgb O2 Saturation 


 


FiO2 


 


Blood Gas Comments 


 


Crit Value Called To 


 


Crit Value Read Back 


 


Blood Gas Notified Time 


 


Sodium 


 


Potassium 


 


Chloride 


 


Carbon Dioxide 


 


Anion Gap 


 


BUN 


 


Creatinine 


 


Est GFR ( Amer) 


 


Est GFR (Non-Af Amer) 


 


POC Glucose (mg/dL)  96


 


Random Glucose 


 


Lactic Acid 


 


Calcium 


 


Phosphorus 


 


Magnesium 


 


Total Bilirubin 


 


AST 


 


ALT 


 


Alkaline Phosphatase 


 


Troponin I 


 


Total Protein 


 


Albumin 


 


Globulin 


 


Albumin/Globulin Ratio 


 


Urine Color 


 


Urine Appearance 


 


Urine pH 


 


Ur Specific Gravity 


 


Urine Protein 


 


Urine Glucose (UA) 


 


Urine Ketones 


 


Urine Blood 


 


Urine Nitrate 


 


Urine Bilirubin 


 


Urine Urobilinogen 


 


Ur Leukocyte Esterase 


 


Urine RBC 


 


Urine WBC 


 


Ur Epithelial Cells 


 


Urine Bacteria 


 


Coarse Granular Casts 














- Constitutional


Appears: Toxic





- Head Exam


Head Exam: NORMAL INSPECTION





- ENT Exam


ENT Exam: Mucous Membranes Moist





- Neck Exam


Neck exam: Full Rom





- Respiratory Exam


Respiratory Exam: Decreased Breath Sounds





- Cardiovascular Exam


Cardiovascular Exam: REGULAR RHYTHM





- GI/Abdominal Exam


GI & Abdominal Exam: Normal Bowel Sounds





- Rectal Exam


Rectal Exam: Deferred





- Extremities Exam


Extremities exam: pedal edema





- Back Exam


Back exam: NORMAL INSPECTION





- Skin


Skin Exam: Normal Color





Assessment/Plan


(1) Cardiac arrest


Assessment and plan: 


unclear etiology.  Recommend serial cardiac enzymes.  Check troponin.  check 

echocardiogram.  continue amiodarone.








Current Visit: Yes   Status: Acute





(2) NSTEMI (non-ST elevated myocardial infarction)


Assessment and plan: 


Patient may have underlying CAD as a cause of his acute event and cardiac 

arrest.  If patient recovers, will require cardiac cath.


Current Visit: Yes   Status: Acute

## 2015-12-10 NOTE — US
HISTORY:

edema ? mass



TECHNIQUE:

Realtime sonography through the scrotum with color and doppler flow.



COMPARISON:

None Available.



FINDINGS:



RIGHT TESTICLE:

Measures 4.9 x 2.9 cm. Normal echotexture and flow.



RIGHT EPIDIDYMIS:

Epididymal head measures 0.7 cm. Grossly unremarkable appearance with 

normal flow.



LEFT TESTICLE:

Measures 5.7 x 4.1 x 3.5 cm. Normal echotexture and flow.



LEFT EPIDIDYMIS:

Epididymal head measures 1.5 cm. Grossly unremarkable appearance with 

normal flow.



HYDROCELE:

Right hydrocele, Large, complex containing debris. Slightly smaller 

debris laden left hydrocele 



VARICOCELE:



OTHER FINDINGS:

Marked scrotal edema 



IMPRESSION:

No acute or significant testicular abnormalities. Negative study for 

orchitis, mass, torsion.



Large bilateral complex hydroceles (right greater than left).

## 2015-12-11 LAB
ALBUMIN SERPL-MCNC: 3.4 G/DL (ref 3.5–5)
ALBUMIN/GLOB SERPL: 0.9 {RATIO} (ref 1–2.1)
ALT SERPL-CCNC: 43 U/L (ref 21–72)
ARTERIAL BLOOD GAS HEMOGLOBIN: 12 G/DL (ref 11.7–17.4)
ARTERIAL BLOOD GAS O2 SAT: 100.1 % (ref 95–98)
ARTERIAL BLOOD GAS PCO2: 54 MM/HG (ref 35–45)
ARTERIAL BLOOD GAS TCO2: 25.9 MMOL/L (ref 22–28)
ARTERIAL PATENCY WRIST A: YES
AST SERPL-CCNC: 48 U/L (ref 17–59)
BASOPHILS # BLD AUTO: 0 K/UL (ref 0–0.2)
BASOPHILS NFR BLD: 0.1 % (ref 0–2)
BUN SERPL-MCNC: 17 MG/DL (ref 9–20)
CALCIUM SERPL-MCNC: 8.8 MG/DL (ref 8.4–10.2)
EOSINOPHIL # BLD AUTO: 0.2 K/UL (ref 0–0.7)
EOSINOPHIL NFR BLD: 1.6 % (ref 0–4)
ERYTHROCYTE [DISTWIDTH] IN BLOOD BY AUTOMATED COUNT: 14.4 % (ref 11.5–14.5)
GFR NON-AFRICAN AMERICAN: 58
HCO3 BLDA-SCNC: 22.3 MMOL/L (ref 21–28)
HGB BLD-MCNC: 11.5 G/DL (ref 12–18)
INHALED O2 CONCENTRATION: 55 %
LYMPHOCYTES # BLD AUTO: 1 K/UL (ref 1–4.3)
LYMPHOCYTES NFR BLD AUTO: 8.9 % (ref 20–40)
MCH RBC QN AUTO: 33.1 PG (ref 27–31)
MCHC RBC AUTO-ENTMCNC: 30.9 G/DL (ref 33–37)
MCV RBC AUTO: 107.1 FL (ref 80–94)
MONOCYTES # BLD: 1 K/UL (ref 0–0.8)
MONOCYTES NFR BLD: 9 % (ref 0–10)
NEUTROPHILS # BLD: 9.1 K/UL (ref 1.8–7)
NEUTROPHILS NFR BLD AUTO: 80.4 % (ref 50–75)
NRBC BLD AUTO-RTO: 0 % (ref 0–0)
O2 CAP BLDA-SCNC: 16.4 ML/DL (ref 16–24)
O2 CT BLDA-SCNC: 16.4 ML/DL (ref 15–23)
PH BLDA: 7.26 [PH] (ref 7.35–7.45)
PLATELET # BLD: 173 K/UL (ref 130–400)
PMV BLD AUTO: 8.3 FL (ref 7.2–11.7)
PO2 BLDA: 116 MM/HG (ref 80–100)
RBC # BLD AUTO: 3.46 MIL/UL (ref 4.4–5.9)
WBC # BLD AUTO: 11.3 K/UL (ref 4.8–10.8)

## 2015-12-11 RX ADMIN — NITROGLYCERIN SCH IN: 20 OINTMENT TOPICAL at 04:01

## 2015-12-11 RX ADMIN — NITROGLYCERIN SCH IN: 20 OINTMENT TOPICAL at 16:40

## 2015-12-11 RX ADMIN — WATER SCH MLS/HR: 1 INJECTION INTRAMUSCULAR; INTRAVENOUS; SUBCUTANEOUS at 16:41

## 2015-12-11 RX ADMIN — WATER SCH MLS/HR: 1 INJECTION INTRAMUSCULAR; INTRAVENOUS; SUBCUTANEOUS at 03:14

## 2015-12-11 RX ADMIN — WATER SCH MLS/HR: 1 INJECTION INTRAMUSCULAR; INTRAVENOUS; SUBCUTANEOUS at 08:54

## 2015-12-11 RX ADMIN — NITROGLYCERIN SCH IN: 20 OINTMENT TOPICAL at 22:24

## 2015-12-11 RX ADMIN — NITROGLYCERIN SCH IN: 20 OINTMENT TOPICAL at 09:02

## 2015-12-11 RX ADMIN — WATER SCH MLS/HR: 1 INJECTION INTRAMUSCULAR; INTRAVENOUS; SUBCUTANEOUS at 20:45

## 2015-12-11 RX ADMIN — ENOXAPARIN SODIUM SCH MG: 40 INJECTION SUBCUTANEOUS at 08:53

## 2015-12-11 RX ADMIN — Medication SCH ML: at 08:53

## 2015-12-11 NOTE — CP.PCM.PN
Subjective





- Subjective


Subjective: 


patient seen this am.  No clinical change





Review of Systems





- Review of Systems


Systems not reviewed;Unavailable: Intubated





Objective





- Vital Signs/Intake and Output


Vital Signs (last 24 hours): 


 Vital Signs - 24 hr











  12/10/15 12/10/15 12/10/15





  20:00 20:46 21:00


 


Temperature 100.9 F H  100.3 F H


 


Pulse Rate 123 H 104 H 105 H


 


Respiratory 21  18





Rate   


 


Blood Pressure 135/88 135/88 139/90


 


O2 Sat by Pulse 100  100





Oximetry   














  12/10/15 12/10/15 12/11/15





  22:00 23:00 00:00


 


Temperature 100 F H 99.6 F 99.5 F


 


Pulse Rate 94 H 92 H 102 H


 


Respiratory 15 14 13





Rate   


 


Blood Pressure 122/74 117/75 135/100 H


 


O2 Sat by Pulse 100 100 100





Oximetry   














  12/11/15 12/11/15 12/11/15





  01:00 02:00 03:00


 


Temperature 99.3 F 100.2 F H 100.2 F H


 


Pulse Rate 96 H 120 H 97 H


 


Respiratory 14 19 16





Rate   


 


Blood Pressure 107/73 151/94 H 152/97 H


 


O2 Sat by Pulse 100 100 100





Oximetry   














  12/11/15 12/11/15 12/11/15





  04:00 05:00 06:00


 


Temperature 99.5 F 98.5 F 98.5 F


 


Pulse Rate 109 H 108 H 101 H


 


Respiratory 19 10 L 9 L





Rate   


 


Blood Pressure 138/80 152/111 H 132/81


 


O2 Sat by Pulse 100 100 100





Oximetry   














  12/11/15 12/11/15 12/11/15





  07:00 08:00 10:00


 


Temperature 99 F 99 F 100.2 F H


 


Pulse Rate 107 H 106 H 108 H


 


Respiratory 14 14 14





Rate   


 


Blood Pressure 122/71 136/92 H 133/86


 


O2 Sat by Pulse 100 100 100





Oximetry   














  12/11/15 12/11/15 12/11/15





  12:00 14:00 16:00


 


Temperature 101.3 F H 99.2 F 97.4 F L


 


Pulse Rate 156 H 123 H 117 H


 


Respiratory 17 15 14





Rate   


 


Blood Pressure 139/77 156/106 H 173/115 H


 


O2 Sat by Pulse 96 98 96





Oximetry   














  12/11/15 12/11/15





  17:58 18:15


 


Temperature 97.5 F L 


 


Pulse Rate 123 H 


 


Respiratory 18 





Rate  


 


Blood Pressure  180/129 H


 


O2 Sat by Pulse 95 





Oximetry  











Intake and Output (last 12 hours): 


 Intake & Output











 12/11/15 12/11/15 12/12/15





 06:59 18:59 06:59


 


Intake Total 2205 2610 


 


Output Total 925 1100 


 


Balance 1280 1510 


 


Intake:   


 


  IV 1500 1350 


 


  Intake, Piggyback 505 300 


 


  Oral  440 


 


  Tube Feeding  220 


 


  Free Water Flush 200 300 


 


Output:   


 


  Urine 925 1100 


 


    Urethral (Lua) 925 1100 














- Medications


Medications: 


 Current Medications





Enalapril Maleate (Vasotec)  10 mg PO DAILY [facility]


   Last Admin: 12/11/15 08:54 Dose:  10 mg


Enoxaparin Sodium (Lovenox)  40 mg SC DAILY [facility]


   Last Admin: 12/11/15 08:53 Dose:  40 mg


Famotidine (Pepcid)  20 mg GT BID [facility]


   Last Admin: 12/11/15 16:40 Dose:  20 mg


Piperacillin Sod/Tazobactam (Sod 3.375 gm/ Sodium Chloride)  100 mls @ 100 mls/

hr IVPB Q6H [facility]


   Last Admin: 12/11/15 16:41 Dose:  100 mls/hr


Vancomycin HCl 1 gm/ Sodium (Chloride)  250 mls @ 125 mls/hr IVPB Q12 [facility]


   Last Admin: 12/11/15 08:54 Dose:  125 mls/hr


Insulin Human Regular (Humulin R)  0 units SC ACHS [facility]


   PRN Reason: Protocol


   Last Admin: 12/11/15 16:39 Dose:  Not Given


Lorazepam (Ativan)  2 mg IVP Q6 PRN


   PRN Reason: Agitation


   Last Admin: 12/11/15 12:30 Dose:  2 mg


Morphine Sulfate (Morphine)  4 mg IVP Q2H PRN


   PRN Reason: Pain, moderate (4-7)


   Last Admin: 12/11/15 17:20 Dose:  4 mg


Multivitamins/Vitamin C (Multi-Delyn Liquid)  5 ml PO DAILY [facility]


   Last Admin: 12/11/15 08:53 Dose:  5 ml


Nitroglycerin (Nitro-Bid 2% Oint)  2 in TOP Q6 [facility]


   Last Admin: 12/11/15 16:40 Dose:  2 in











- Labs


Labs (last 24 hours): 


 Laboratory Results - last 24 hr











  12/09/15 12/10/15 12/11/15





  15:30 20:39 04:05


 


WBC    11.3 H


 


RBC    3.46 L


 


Hgb    11.5 L


 


Hct    37.1


 


MCV    107.1 H D


 


MCH    33.1 H


 


MCHC    30.9 L


 


RDW    14.4


 


Plt Count    173


 


MPV    8.3


 


Neut % (Auto)    80.4 H


 


Lymph % (Auto)    8.9 L


 


Mono % (Auto)    9.0


 


Eos % (Auto)    1.6


 


Baso % (Auto)    0.1


 


Neut #    9.1 H


 


Lymph #    1.0


 


Mono #    1.0 H


 


Eos #    0.2


 


Baso #    0.0


 


pCO2   


 


pO2   


 


HCO3   


 


ABG pH   


 


ABG Total CO2   


 


ABG O2 Saturation   


 


ABG O2 Content   


 


ABG Base Excess   


 


ABG Hemoglobin   


 


ABG Carboxyhemoglobin   


 


POC ABG HHb (Measured)   


 


ABG Methemoglobin   


 


ABG O2 Capacity   


 


Chu Test   


 


A-a O2 Difference   


 


Hgb O2 Saturation   


 


FiO2   


 


Sodium    145


 


Potassium    4.2


 


Chloride    110 H


 


Carbon Dioxide    24


 


Anion Gap    15


 


BUN    17


 


Creatinine    1.3


 


Est GFR ( Amer)    > 60


 


Est GFR (Non-Af Amer)    58


 


POC Glucose (mg/dL)   109 


 


Random Glucose    114 H


 


Lactic Acid    0.8


 


Calcium    8.8


 


Ionized Calcium  4.5 L  


 


Total Bilirubin    0.5


 


AST    48


 


ALT    43


 


Alkaline Phosphatase    112


 


Total Protein    7.0


 


Albumin    3.4 L


 


Globulin    3.6


 


Albumin/Globulin Ratio    0.9 L














  12/11/15 12/11/15 12/11/15





  04:46 06:36 10:52


 


WBC   


 


RBC   


 


Hgb   


 


Hct   


 


MCV   


 


MCH   


 


MCHC   


 


RDW   


 


Plt Count   


 


MPV   


 


Neut % (Auto)   


 


Lymph % (Auto)   


 


Mono % (Auto)   


 


Eos % (Auto)   


 


Baso % (Auto)   


 


Neut #   


 


Lymph #   


 


Mono #   


 


Eos #   


 


Baso #   


 


pCO2  54 H  


 


pO2  116 H  


 


HCO3  22.3  


 


ABG pH  7.26 L  


 


ABG Total CO2  25.9  


 


ABG O2 Saturation  100.1 H  


 


ABG O2 Content  16.4  


 


ABG Base Excess  -3.4 L  


 


ABG Hemoglobin  12.0  


 


ABG Carboxyhemoglobin  2.4 H  


 


POC ABG HHb (Measured)  -0.1 L  


 


ABG Methemoglobin  1.7  


 


ABG O2 Capacity  16.4  


 


Chu Test  Yes  


 


A-a O2 Difference  209.0  


 


Hgb O2 Saturation  96.1  


 


FiO2  55.0  


 


Sodium   


 


Potassium   


 


Chloride   


 


Carbon Dioxide   


 


Anion Gap   


 


BUN   


 


Creatinine   


 


Est GFR ( Amer)   


 


Est GFR (Non-Af Amer)   


 


POC Glucose (mg/dL)   133 H  95


 


Random Glucose   


 


Lactic Acid   


 


Calcium   


 


Ionized Calcium   


 


Total Bilirubin   


 


AST   


 


ALT   


 


Alkaline Phosphatase   


 


Total Protein   


 


Albumin   


 


Globulin   


 


Albumin/Globulin Ratio   














- Constitutional


Appears: Toxic





- Head Exam


Head Exam: NORMAL INSPECTION





- Eye Exam


Eye Exam: Normal appearance





- ENT Exam


ENT Exam: Mucous Membranes Dry





- Neck Exam


Neck exam: Full Rom





- Respiratory Exam


Respiratory Exam: Decreased Breath Sounds





- Cardiovascular Exam


Cardiovascular Exam: REGULAR RHYTHM





- GI/Abdominal Exam


GI & Abdominal Exam: Normal Bowel Sounds





- Rectal Exam


Rectal Exam: Deferred





- Extremities Exam


Extremities exam: pedal edema





- Back Exam


Back exam: NORMAL INSPECTION





- Skin


Skin Exam: Normal Color





Assessment/Plan


(1) Cardiac arrest


Assessment and plan: 


unclear etiology.  Recommend serial cardiac enzymes.  Check troponin.  check 

echocardiogram.  continue amiodarone.








Current Visit: Yes   Status: Acute





(2) NSTEMI (non-ST elevated myocardial infarction)


Assessment and plan: 


Patient may have underlying CAD as a cause of his acute event and cardiac 

arrest.  If patient recovers, will require cardiac cath.


Current Visit: Yes   Status: Acute

## 2015-12-11 NOTE — CARD
--------------- APPROVED REPORT --------------





EKG Measurement

Heart [id number]

VA [id number]

[id number]

[id number]

[id number]



<Conclusion>

Sinus tachycardia

Possible Left atrial enlargement

Left ventricular hypertrophy

ST elevation, consider early repolarization, pericarditis, or injury

Abnormal ECG

## 2015-12-11 NOTE — RAD
HISTORY:

Intubated. Technique: Single view portable semi erect @ 8:15 a.m.



COMPARISON:

December 10, 2015.  



FINDINGS:



LUNGS:

Stable multifocal infiltrates.



PLEURA:

No significant pleural effusion identified, no pneumothorax apparent.



CARDIOVASCULAR:

Normal.



OSSEOUS STRUCTURES:

No significant abnormalities.



VISUALIZED UPPER ABDOMEN:

Normal.



OTHER FINDINGS:

Stable position of endotracheal tube.



IMPRESSION:

No acute interval change since

## 2015-12-11 NOTE — CP.PCM.PN
Subjective





- Subjective


Subjective: 


pt in bed not resposonive to verbal/tactile stimuli.  pts hr noted to be in 

140s.  pt has periods of agitation.  all b/w and imaging and cultures noted. 


propofol d/c this am for bradycardia


pt did not respond to providers voice or during evaluation. per rn when friend 

was at bedside pt had opened eyes briefly but was unable to focus 





Review of Systems





- Review of Systems


Systems not reviewed;Unavailable: Intubated





- Constitutional


Constitutional: UN





- EENT


Eyes: UNREMARKABLE


Ears: UNREMARKABLE


Nose/Mouth/Throat: UNREMARKABLE





- Cardiovascular


Cardiovascular: As Per HPI, Rapid Heart Rate





- Respiratory


Respiratory: UNREMARKABLE





- Gastrointestinal


Gastrointestinal: UNREMARKABLE





- Genitourinary


Genitourinary: UNREMARKABLE





- Reproductive: Male


Reproductive:Male: UNREMARKABLE





- Musculoskeletal


Musculoskeletal: UNREMARKABLE





- Integumentary


Integumentary: UNREMARKABLE





- Neurological


Neurological: UNREMARKABLE





- Psychiatric


Psychiatric: UNREMARKABLE





- Endocrine


Endocrine: UNREMARKABLE





- Hematologic/Lymphatic


Hematologic: UNREMARKABLE





Objective





- Vital Signs/Intake and Output


Vital Signs (last 24 hours): 


 Vital Signs - 24 hr











  12/10/15 12/10/15 12/10/15





  18:00 19:00 20:00


 


Temperature  99.2 F 100.9 F H


 


Pulse Rate 105 H 108 H 123 H


 


Respiratory 14 14 21





Rate   


 


Blood Pressure 122/75 110/69 135/88


 


O2 Sat by Pulse 100 100 100





Oximetry   














  12/10/15 12/10/15 12/10/15





  20:46 21:00 22:00


 


Temperature  100.3 F H 100 F H


 


Pulse Rate 104 H 105 H 94 H


 


Respiratory  18 15





Rate   


 


Blood Pressure 135/88 139/90 122/74


 


O2 Sat by Pulse  100 100





Oximetry   














  12/10/15 12/11/15 12/11/15





  23:00 00:00 01:00


 


Temperature 99.6 F 99.5 F 99.3 F


 


Pulse Rate 92 H 102 H 96 H


 


Respiratory 14 13 14





Rate   


 


Blood Pressure 117/75 135/100 H 107/73


 


O2 Sat by Pulse 100 100 100





Oximetry   














  12/11/15 12/11/15 12/11/15





  02:00 03:00 04:00


 


Temperature 100.2 F H 100.2 F H 99.5 F


 


Pulse Rate 120 H 97 H 109 H


 


Respiratory 19 16 19





Rate   


 


Blood Pressure 151/94 H 152/97 H 138/80


 


O2 Sat by Pulse 100 100 100





Oximetry   














  12/11/15 12/11/15 12/11/15





  05:00 06:00 07:00


 


Temperature 98.5 F 98.5 F 99 F


 


Pulse Rate 108 H 101 H 107 H


 


Respiratory 10 L 9 L 14





Rate   


 


Blood Pressure 152/111 H 132/81 122/71


 


O2 Sat by Pulse 100 100 100





Oximetry   














  12/11/15 12/11/15 12/11/15





  08:00 10:00 12:00


 


Temperature 99 F 100.2 F H 101.3 F H


 


Pulse Rate 106 H 108 H 156 H


 


Respiratory 14 14 17





Rate   


 


Blood Pressure 136/92 H 133/86 139/77


 


O2 Sat by Pulse 100 100 96





Oximetry   














  12/11/15 12/11/15





  14:00 16:00


 


Temperature 99.2 F 97.4 F L


 


Pulse Rate 123 H 117 H


 


Respiratory 15 14





Rate  


 


Blood Pressure 156/106 H 173/115 H


 


O2 Sat by Pulse 98 96





Oximetry  











Intake and Output (last 12 hours): 


 Intake & Output











 12/10/15 12/11/15 12/11/15





 18:59 06:59 18:59


 


Intake Total 2804 2205 2150


 


Output Total 700 925 


 


Balance 2104 1280 2150


 


Intake:   


 


  IV 1594 1500 1200


 


  Intake, Piggyback 520 505 200


 


  Oral 80  440


 


  Tube Feeding 410  110


 


  Free Water Flush 200 200 200


 


Output:   


 


  Urine 700 925 


 


    Urethral (Lua) 700 925 














- Medications


Medications: 


 Current Medications





Amlodipine Besylate (Norvasc)  10 mg PO ONCE ONE


   Stop: 12/11/15 17:49


Enalapril Maleate (Vasotec)  10 mg PO DAILY [facility]


   Last Admin: 12/11/15 08:54 Dose:  10 mg


Enoxaparin Sodium (Lovenox)  40 mg SC DAILY [facility]


   Last Admin: 12/11/15 08:53 Dose:  40 mg


Famotidine (Pepcid)  20 mg GT BID [facility]


   Last Admin: 12/11/15 16:40 Dose:  20 mg


Piperacillin Sod/Tazobactam (Sod 3.375 gm/ Sodium Chloride)  100 mls @ 100 mls/

hr IVPB Q6H [facility]


   Last Admin: 12/11/15 16:41 Dose:  100 mls/hr


Vancomycin HCl 1 gm/ Sodium (Chloride)  250 mls @ 125 mls/hr IVPB Q12 [facility]


   Last Admin: 12/11/15 08:54 Dose:  125 mls/hr


Insulin Human Regular (Humulin R)  0 units SC ACHS [facility]


   PRN Reason: Protocol


   Last Admin: 12/11/15 16:39 Dose:  Not Given


Lorazepam (Ativan)  2 mg IVP Q6 PRN


   PRN Reason: Agitation


   Last Admin: 12/11/15 12:30 Dose:  2 mg


Morphine Sulfate (Morphine)  4 mg IVP Q2H PRN


   PRN Reason: Pain, moderate (4-7)


   Last Admin: 12/11/15 12:10 Dose:  4 mg


Multivitamins/Vitamin C (Multi-Delyn Liquid)  5 ml PO DAILY [facility]


   Last Admin: 12/11/15 08:53 Dose:  5 ml


Nitroglycerin (Nitro-Bid 2% Oint)  2 in TOP Q6 [facility]


   Last Admin: 12/11/15 16:40 Dose:  2 in











- Labs


Labs (last 24 hours): 


 Laboratory Results - last 24 hr











  12/09/15 12/10/15 12/11/15





  15:30 20:39 04:05


 


WBC    11.3 H


 


RBC    3.46 L


 


Hgb    11.5 L


 


Hct    37.1


 


MCV    107.1 H D


 


MCH    33.1 H


 


MCHC    30.9 L


 


RDW    14.4


 


Plt Count    173


 


MPV    8.3


 


Neut % (Auto)    80.4 H


 


Lymph % (Auto)    8.9 L


 


Mono % (Auto)    9.0


 


Eos % (Auto)    1.6


 


Baso % (Auto)    0.1


 


Neut #    9.1 H


 


Lymph #    1.0


 


Mono #    1.0 H


 


Eos #    0.2


 


Baso #    0.0


 


pCO2   


 


pO2   


 


HCO3   


 


ABG pH   


 


ABG Total CO2   


 


ABG O2 Saturation   


 


ABG O2 Content   


 


ABG Base Excess   


 


ABG Hemoglobin   


 


ABG Carboxyhemoglobin   


 


POC ABG HHb (Measured)   


 


ABG Methemoglobin   


 


ABG O2 Capacity   


 


Chu Test   


 


A-a O2 Difference   


 


Hgb O2 Saturation   


 


FiO2   


 


Sodium    145


 


Potassium    4.2


 


Chloride    110 H


 


Carbon Dioxide    24


 


Anion Gap    15


 


BUN    17


 


Creatinine    1.3


 


Est GFR ( Amer)    > 60


 


Est GFR (Non-Af Amer)    58


 


POC Glucose (mg/dL)   109 


 


Random Glucose    114 H


 


Lactic Acid    0.8


 


Calcium    8.8


 


Ionized Calcium  4.5 L  


 


Total Bilirubin    0.5


 


AST    48


 


ALT    43


 


Alkaline Phosphatase    112


 


Total Protein    7.0


 


Albumin    3.4 L


 


Globulin    3.6


 


Albumin/Globulin Ratio    0.9 L














  12/11/15 12/11/15 12/11/15





  04:46 06:36 10:52


 


WBC   


 


RBC   


 


Hgb   


 


Hct   


 


MCV   


 


MCH   


 


MCHC   


 


RDW   


 


Plt Count   


 


MPV   


 


Neut % (Auto)   


 


Lymph % (Auto)   


 


Mono % (Auto)   


 


Eos % (Auto)   


 


Baso % (Auto)   


 


Neut #   


 


Lymph #   


 


Mono #   


 


Eos #   


 


Baso #   


 


pCO2  54 H  


 


pO2  116 H  


 


HCO3  22.3  


 


ABG pH  7.26 L  


 


ABG Total CO2  25.9  


 


ABG O2 Saturation  100.1 H  


 


ABG O2 Content  16.4  


 


ABG Base Excess  -3.4 L  


 


ABG Hemoglobin  12.0  


 


ABG Carboxyhemoglobin  2.4 H  


 


POC ABG HHb (Measured)  -0.1 L  


 


ABG Methemoglobin  1.7  


 


ABG O2 Capacity  16.4  


 


Chu Test  Yes  


 


A-a O2 Difference  209.0  


 


Hgb O2 Saturation  96.1  


 


FiO2  55.0  


 


Sodium   


 


Potassium   


 


Chloride   


 


Carbon Dioxide   


 


Anion Gap   


 


BUN   


 


Creatinine   


 


Est GFR ( Amer)   


 


Est GFR (Non-Af Amer)   


 


POC Glucose (mg/dL)   133 H  95


 


Random Glucose   


 


Lactic Acid   


 


Calcium   


 


Ionized Calcium   


 


Total Bilirubin   


 


AST   


 


ALT   


 


Alkaline Phosphatase   


 


Total Protein   


 


Albumin   


 


Globulin   


 


Albumin/Globulin Ratio   














- Constitutional


Appears: Well, Non-toxic, No Acute Distress





- Head Exam


Head Exam: ATRAUMATIC, NORMAL INSPECTION, NORMOCEPHALIC





- Eye Exam


Eye Exam: EOMI





- Respiratory Exam


Respiratory Exam: Clear to PA & Lateral, NORMAL BREATHING PATTERN, UNREMARKABLE





- Cardiovascular Exam


Cardiovascular Exam: REGULAR RHYTHM, RRR





- GI/Abdominal Exam


GI & Abdominal Exam: Normal Bowel Sounds, Soft, Unremarkable





- Extremities Exam


Extremities exam: full ROM, normal capillary refill, normal inspection, pedal 

pulses present





- Back Exam


Back exam: FULL ROM





- Neurological Exam


Neurological exam: Alert, CN II-XII Intact, Normal Gait, Oriented x3, Reflexes 

Normal





- Psychiatric Exam


Psychiatric exam: Normal Affect, Normal Mood





- Skin


Skin Exam: Dry, Intact, Normal Color, Warm





Assessment/Plan


(1) Cardiac arrest


Current Visit: Yes   Status: Acute


Comment: s/p acls proceudres, ST w/ periods of SB


cont amiodarone


echo


cardio consult


sedation gtt held nad prn sedation ordered by icu attending. 


cath when stable


cont cooling protocol


cont sedation, ivf








(2) DVT prophylaxis


Current Visit: Yes   Status: Acute


Comment: lovenox








(3) Scrotal edema


Current Visit: Yes   Status: Acute


Comment: scrotal us-noted


urology consult-pending








(4) History of seizure


Current Visit: Yes   Status: Acute


Comment: neuro consult








(5) Diabetes mellitus with hyperglycemia


Current Visit: Yes   Status: Acute


Comment: riss per protocol











- Assessment and Plan (Free Text)


Assessment: 


friend of pt is attempting to location family although apparently family and pt 

have not spoken in a long period of time. 


pt is being arranged for [facility] care and will possibly have trach/peg tube placed 

next week pending condition at that time.

## 2015-12-11 NOTE — CP.CCUPN
CCU Subjective





- Physician Review


Subjective (Free Text): 


   





***INTENSIVIST PROGRESS NOTE***


Patient examined, interim events reviewed:





Removed from propfol sedation this Am due to severe bradycardia with HR in 40's

, Lopressor stopped as well. Placed on Ativan and MSO4 for anxiolysis. During 

sedation vacation, still does not follow any commands nor is interactive. 

Resumed back on sedation with Ativan to restore ventilator synchrony.  

Breathing 15 on AC 14, TV 600ml, Peep 5, 55% oxygen and Ve= 7.9, PPP= 22, SPO2 

100%.  Last 24 Hour I/Os= 5009/1625. Female friend visited (the person who 

initially resuscitated him in [employer]), and states he has local family which she 

is trying to get in contact with. He has a hx of "epilepsy" and was 

noncompliant with meds. He speaks only Macedonian. 





No other distress noted: 


EXAM-


HEENT: no icterus, no nystagmus, pupils 2 mm, no gaze preference,  no nystagmus


NECK: no visible JVD, supple, carotids equal upstroke bilat/no bruits


CHEST: decreased BS bases, no wheezes audible


HEART:  regular, distant, S1S2, no murmur audible, no rubs.


ABD:  soft, no increased distention, no focal tenderness,  no HSM. BS hypoactive

, 


EXT:    no edema, no peripheral/ digital cyanosis, no palpable cords, distal 

pulses intact and symmetrical


NEURO:  +tone, plantars no response. Otherwise flaccid legs. 


SKIN:   no rashes





LABS


WBC= 11.3


HGB= 11.5


PLTs = 173K


Na= 145


K=  4.3


HCO3= 24


BUN/Cr=  17/1.3


BS= 114


7.26/54/116


CXR: ETT position high above ru, inchanged multi-lobular interstitial 

changes: entire left lung, R hilar / RML area and RUL (my interp).





Assessment: 


1.   Cardiac Arrest with resuscitation from VT. 


2.   Post-Resuscitation Therapeutic Hypothermia-completed


3.   Anoxic Encephalopathy


4.     Aspiration Pneumonitis


5.   Scrotal swelling, r/o infection/inflammation/ mass





PLAN:


1.    Continue attempts to wean of anxiolytics in order to assess for any 

improvement in neuromental status.


2.    Consider EEG, start Keppra, ?? non-convulsive status epi??


3.    No MV weans feasible at this time.


4.    Empiric abx coverage


5.    Enteral tube feedings, IV H2B's, Lovenox prx.


6.    IV NTG to enterally administered antihypertensives.


7.    Scrotal US study shows bilatateral hydrocles.


8.    Re-position ETT lower. 








CCU Objective





- Vital Signs / Intake & Output


Vital Signs (Last 4 hours): 


Vital Signs











  Temp Pulse Resp BP Pulse Ox


 


 12/11/15 10:00  100.2 F H  108 H  14  133/86  100


 


 12/11/15 08:00  99 F  106 H  14  136/92 H  100


 


 12/11/15 07:00  99 F  107 H  14  122/71  100











Intake and Output (Last 8hrs): 


 Intake & Output











 12/10/15 12/11/15 12/11/15





 22:59 06:59 14:59


 


Intake Total 1703 1500 820


 


Output Total 1050 575 


 


Balance 653 925 820


 


Intake:   


 


  IV 1050 1050 300


 


  Intake, Piggyback 233 350 200


 


  Oral   220


 


  Tube Feeding 220  


 


  Free Water Flush 200 100 100


 


Output:   


 


  Urine 1050 575 


 


    Urethral (Lua) 1050 575 














- Medications


Active Medications: 


Active Medications











Generic Name Dose Route Start Last Admin





  Trade Name Freq  PRN Reason Stop Dose Admin


 


Enalapril Maleate  10 mg 12/10/15 11:15 12/11/15 08:54





  Vasotec  PO   10 mg





  DAILY MAGDALENA   Administration


 


Enoxaparin Sodium  40 mg 12/09/15 09:00 12/11/15 08:53





  Lovenox  SC   40 mg





  DAILY MAGDALENA   Administration


 


Famotidine  20 mg 12/10/15 17:00 12/11/15 08:53





  Pepcid  GT   20 mg





  BID MAGDALENA   Administration


 


Piperacillin Sod/Tazobactam  100 mls @ 100 mls/hr 12/08/15 14:30 12/11/15 08:54





  Sod 3.375 gm/ Sodium Chloride  IVPB   100 mls/hr





  Q6H MAGDALENA   Administration


 


Vancomycin HCl 1 gm/ Sodium  250 mls @ 125 mls/hr 12/08/15 18:45 12/11/15 08:54





  Chloride  IVPB   125 mls/hr





  Q12 MAGDALENA   Administration


 


Insulin Human Regular  0 units 12/10/15 22:00 12/11/15 06:39





  Humulin R  SC   2 units





  ACHS MAGDALENA   Administration





  Protocol   


 


Lorazepam  2 mg 12/11/15 06:58 12/11/15 07:00





  Ativan  IVP   2 mg





  Q6 PRN   Administration





  Agitation   


 


Morphine Sulfate  4 mg 12/09/15 13:10 12/11/15 06:29





  Morphine  IVP   4 mg





  Q2H PRN   Administration





  Pain, moderate (4-7)   


 


Multivitamins/Vitamin C  5 ml 12/10/15 09:00 12/11/15 08:53





  Multi-Delyn Liquid  PO   5 ml





  DAILY MAGDALENA   Administration


 


Nitroglycerin  2 in 12/10/15 16:00 12/11/15 09:02





  Nitro-Bid 2% Oint  TOP   2 in





  Q6 MAGDALENA   Administration














- Patient Studies


Lab Studies: 


 Microbiology Studies











 12/08/15 Unknown MRSA Culture (Admit) - Final





 Naris    MRSA NOT DETECTED


 


 12/08/15 15:10 Blood Culture - Preliminary





 Blood    NO GROWTH AFTER 48 HOURS


 


 12/08/15 12:00 Blood Culture - Preliminary





 Blood-Venous    NO GROWTH AFTER 48 HOURS


 


 12/08/15 12:00 Blood Culture - Preliminary





 Blood-Venous    NO GROWTH AFTER 48 HOURS


 


 12/08/15 11:00 Urine Culture - Final





 Urine,Catheterized    Klebsiella Pneumoniae Ssp Pneu








 Lab Studies











  12/11/15 12/11/15 12/11/15 Range/Units





  06:36 04:46 04:05 


 


WBC    11.3 H  (4.8-10.8)  K/uL


 


RBC    3.46 L  (4.40-5.90)  Mil/uL


 


Hgb    11.5 L  (12.0-18.0)  g/dL


 


Hct    37.1  (35.0-51.0)  %


 


MCV    107.1 H D  (80.0-94.0)  fl


 


MCH    33.1 H  (27.0-31.0)  pg


 


MCHC    30.9 L  (33.0-37.0)  g/dL


 


RDW    14.4  (11.5-14.5)  %


 


Plt Count    173  (130-400)  K/uL


 


MPV    8.3  (7.2-11.7)  fl


 


Neut % (Auto)    80.4 H  (50.0-75.0)  %


 


Lymph % (Auto)    8.9 L  (20.0-40.0)  %


 


Mono % (Auto)    9.0  (0.0-10.0)  %


 


Eos % (Auto)    1.6  (0.0-4.0)  %


 


Baso % (Auto)    0.1  (0.0-2.0)  %


 


Neut #    9.1 H  (1.8-7.0)  K/uL


 


Lymph #    1.0  (1.0-4.3)  K/uL


 


Mono #    1.0 H  (0.0-0.8)  K/uL


 


Eos #    0.2  (0.0-0.7)  K/uL


 


Baso #    0.0  (0.0-0.2)  K/uL


 


pCO2   54 H   (35-45)  mm/Hg


 


pO2   116 H   ()  mm/Hg


 


HCO3   22.3   (21-28)  mmol/L


 


ABG pH   7.26 L   (7.35-7.45)  


 


ABG Total CO2   25.9   (22-28)  mmol/L


 


ABG O2 Saturation   100.1 H   (95-98)  %


 


ABG O2 Content   16.4   (15-23)  ML/dL


 


ABG Base Excess   -3.4 L   (-2.0-3.0)  mmol/L


 


ABG Hemoglobin   12.0   (11.7-17.4)  g/dL


 


ABG Carboxyhemoglobin   2.4 H   (0.5-1.5)  %


 


POC ABG HHb (Measured)   -0.1 L   (0.0-5.0)  %


 


ABG Methemoglobin   1.7   (0.0-3.0)  %


 


ABG O2 Capacity   16.4   (16-24)  mL/dL


 


Chu Test   Yes   


 


A-a O2 Difference   209.0   mm/Hg


 


Hgb O2 Saturation   96.1   (95.0-98.0)  %


 


FiO2   55.0   %


 


Sodium    145  (132-148)  mmol/l


 


Potassium    4.2  (3.6-5.0)  MMOL/L


 


Chloride    110 H  ()  mmol/L


 


Carbon Dioxide    24  (22-30)  mmol/L


 


Anion Gap    15  (10-20)  


 


BUN    17  (9-20)  mg/dl


 


Creatinine    1.3  (0.8-1.5)  mg/dL


 


Est GFR (African Amer)    > 60  


 


Est GFR (Non-Af Amer)    58  


 


POC Glucose (mg/dL)  133 H    ()  mg/dL


 


Random Glucose    114 H  ()  mg/dL


 


Lactic Acid    0.8  (0.7-2.1)  MMOL/L


 


Calcium    8.8  (8.4-10.2)  mg/dL


 


Total Bilirubin    0.5  (0.2-1.3)  mg/dl


 


AST    48  (17-59)  U/L


 


ALT    43  (21-72)  U/L


 


Alkaline Phosphatase    112  ()  U/L


 


Total Protein    7.0  (6.3-8.2)  G/DL


 


Albumin    3.4 L  (3.5-5.0)  g/dL


 


Globulin    3.6  (2.2-3.9)  gm/dL


 


Albumin/Globulin Ratio    0.9 L  (1.0-2.1)  














  12/10/15 12/10/15 12/10/15 Range/Units





  20:39 16:27 10:00 


 


WBC     (4.8-10.8)  K/uL


 


RBC     (4.40-5.90)  Mil/uL


 


Hgb     (12.0-18.0)  g/dL


 


Hct     (35.0-51.0)  %


 


MCV     (80.0-94.0)  fl


 


MCH     (27.0-31.0)  pg


 


MCHC     (33.0-37.0)  g/dL


 


RDW     (11.5-14.5)  %


 


Plt Count     (130-400)  K/uL


 


MPV     (7.2-11.7)  fl


 


Neut % (Auto)     (50.0-75.0)  %


 


Lymph % (Auto)     (20.0-40.0)  %


 


Mono % (Auto)     (0.0-10.0)  %


 


Eos % (Auto)     (0.0-4.0)  %


 


Baso % (Auto)     (0.0-2.0)  %


 


Neut #     (1.8-7.0)  K/uL


 


Lymph #     (1.0-4.3)  K/uL


 


Mono #     (0.0-0.8)  K/uL


 


Eos #     (0.0-0.7)  K/uL


 


Baso #     (0.0-0.2)  K/uL


 


pCO2     (35-45)  mm/Hg


 


pO2     ()  mm/Hg


 


HCO3     (21-28)  mmol/L


 


ABG pH     (7.35-7.45)  


 


ABG Total CO2     (22-28)  mmol/L


 


ABG O2 Saturation     (95-98)  %


 


ABG O2 Content     (15-23)  ML/dL


 


ABG Base Excess     (-2.0-3.0)  mmol/L


 


ABG Hemoglobin     (11.7-17.4)  g/dL


 


ABG Carboxyhemoglobin     (0.5-1.5)  %


 


POC ABG HHb (Measured)     (0.0-5.0)  %


 


ABG Methemoglobin     (0.0-3.0)  %


 


ABG O2 Capacity     (16-24)  mL/dL


 


Chu Test     


 


A-a O2 Difference     mm/Hg


 


Hgb O2 Saturation     (95.0-98.0)  %


 


FiO2     %


 


Sodium     (132-148)  mmol/l


 


Potassium     (3.6-5.0)  MMOL/L


 


Chloride     ()  mmol/L


 


Carbon Dioxide     (22-30)  mmol/L


 


Anion Gap     (10-20)  


 


BUN     (9-20)  mg/dl


 


Creatinine     (0.8-1.5)  mg/dL


 


Est GFR (African Amer)     


 


Est GFR (Non-Af Amer)     


 


POC Glucose (mg/dL)  109  117 H  96  ()  mg/dL


 


Random Glucose     ()  mg/dL


 


Lactic Acid     (0.7-2.1)  MMOL/L


 


Calcium     (8.4-10.2)  mg/dL


 


Total Bilirubin     (0.2-1.3)  mg/dl


 


AST     (17-59)  U/L


 


ALT     (21-72)  U/L


 


Alkaline Phosphatase     ()  U/L


 


Total Protein     (6.3-8.2)  G/DL


 


Albumin     (3.5-5.0)  g/dL


 


Globulin     (2.2-3.9)  gm/dL


 


Albumin/Globulin Ratio     (1.0-2.1)  








 Laboratory Results - last 24 hr











  12/10/15 12/10/15 12/10/15





  10:00 16:27 20:39


 


WBC   


 


RBC   


 


Hgb   


 


Hct   


 


MCV   


 


MCH   


 


MCHC   


 


RDW   


 


Plt Count   


 


MPV   


 


Neut % (Auto)   


 


Lymph % (Auto)   


 


Mono % (Auto)   


 


Eos % (Auto)   


 


Baso % (Auto)   


 


Neut #   


 


Lymph #   


 


Mono #   


 


Eos #   


 


Baso #   


 


pCO2   


 


pO2   


 


HCO3   


 


ABG pH   


 


ABG Total CO2   


 


ABG O2 Saturation   


 


ABG O2 Content   


 


ABG Base Excess   


 


ABG Hemoglobin   


 


ABG Carboxyhemoglobin   


 


POC ABG HHb (Measured)   


 


ABG Methemoglobin   


 


ABG O2 Capacity   


 


Chu Test   


 


A-a O2 Difference   


 


Hgb O2 Saturation   


 


FiO2   


 


Sodium   


 


Potassium   


 


Chloride   


 


Carbon Dioxide   


 


Anion Gap   


 


BUN   


 


Creatinine   


 


Est GFR ( Amer)   


 


Est GFR (Non-Af Amer)   


 


POC Glucose (mg/dL)  96  117 H  109


 


Random Glucose   


 


Lactic Acid   


 


Calcium   


 


Total Bilirubin   


 


AST   


 


ALT   


 


Alkaline Phosphatase   


 


Total Protein   


 


Albumin   


 


Globulin   


 


Albumin/Globulin Ratio   














  12/11/15 12/11/15 12/11/15





  04:05 04:46 06:36


 


WBC  11.3 H  


 


RBC  3.46 L  


 


Hgb  11.5 L  


 


Hct  37.1  


 


MCV  107.1 H D  


 


MCH  33.1 H  


 


MCHC  30.9 L  


 


RDW  14.4  


 


Plt Count  173  


 


MPV  8.3  


 


Neut % (Auto)  80.4 H  


 


Lymph % (Auto)  8.9 L  


 


Mono % (Auto)  9.0  


 


Eos % (Auto)  1.6  


 


Baso % (Auto)  0.1  


 


Neut #  9.1 H  


 


Lymph #  1.0  


 


Mono #  1.0 H  


 


Eos #  0.2  


 


Baso #  0.0  


 


pCO2   54 H 


 


pO2   116 H 


 


HCO3   22.3 


 


ABG pH   7.26 L 


 


ABG Total CO2   25.9 


 


ABG O2 Saturation   100.1 H 


 


ABG O2 Content   16.4 


 


ABG Base Excess   -3.4 L 


 


ABG Hemoglobin   12.0 


 


ABG Carboxyhemoglobin   2.4 H 


 


POC ABG HHb (Measured)   -0.1 L 


 


ABG Methemoglobin   1.7 


 


ABG O2 Capacity   16.4 


 


Chu Test   Yes 


 


A-a O2 Difference   209.0 


 


Hgb O2 Saturation   96.1 


 


FiO2   55.0 


 


Sodium  145  


 


Potassium  4.2  


 


Chloride  110 H  


 


Carbon Dioxide  24  


 


Anion Gap  15  


 


BUN  17  


 


Creatinine  1.3  


 


Est GFR ( Amer)  > 60  


 


Est GFR (Non-Af Amer)  58  


 


POC Glucose (mg/dL)    133 H


 


Random Glucose  114 H  


 


Lactic Acid  0.8  


 


Calcium  8.8  


 


Total Bilirubin  0.5  


 


AST  48  


 


ALT  43  


 


Alkaline Phosphatase  112  


 


Total Protein  7.0  


 


Albumin  3.4 L  


 


Globulin  3.6  


 


Albumin/Globulin Ratio  0.9 L  











Fingerstick Blood Sugar Results: 133


 


Total Protein  7.0  


 


Albumin  3.4 L  


 


Globulin  3.6  


 


Albumin/Globulin Ratio  0.9 L  











Fingerstick Blood Sugar Results: 133

## 2015-12-12 LAB
ARTERIAL BLOOD GAS HEMOGLOBIN: 11.7 G/DL (ref 11.7–17.4)
ARTERIAL BLOOD GAS O2 SAT: 91.3 % (ref 95–98)
ARTERIAL BLOOD GAS PCO2: 59 MM/HG (ref 35–45)
ARTERIAL BLOOD GAS TCO2: 28.9 MMOL/L (ref 22–28)
ARTERIAL PATENCY WRIST A: YES
BASOPHILS # BLD AUTO: 0 K/UL (ref 0–0.2)
BASOPHILS NFR BLD: 0.1 % (ref 0–2)
BUN SERPL-MCNC: 15 MG/DL (ref 9–20)
CALCIUM SERPL-MCNC: 9 MG/DL (ref 8.4–10.2)
EOSINOPHIL # BLD AUTO: 0 K/UL (ref 0–0.7)
EOSINOPHIL NFR BLD: 0.1 % (ref 0–4)
ERYTHROCYTE [DISTWIDTH] IN BLOOD BY AUTOMATED COUNT: 14.1 % (ref 11.5–14.5)
GFR NON-AFRICAN AMERICAN: > 60
HCO3 BLDA-SCNC: 24.2 MMOL/L (ref 21–28)
HGB BLD-MCNC: 11.1 G/DL (ref 12–18)
INHALED O2 CONCENTRATION: 55 %
LYMPHOCYTE: 4 % (ref 20–50)
LYMPHOCYTES # BLD AUTO: 0.4 K/UL (ref 1–4.3)
LYMPHOCYTES NFR BLD AUTO: 3.8 % (ref 20–40)
MCH RBC QN AUTO: 33.1 PG (ref 27–31)
MCHC RBC AUTO-ENTMCNC: 31.1 G/DL (ref 33–37)
MCV RBC AUTO: 106.6 FL (ref 80–94)
MONOCYTE: 10 % (ref 0–10)
MONOCYTES # BLD: 1.2 K/UL (ref 0–0.8)
MONOCYTES NFR BLD: 11 % (ref 0–10)
NEUTROPHILS # BLD: 9.3 K/UL (ref 1.8–7)
NEUTROPHILS NFR BLD AUTO: 85 % (ref 50–75)
NEUTROPHILS NFR BLD AUTO: 86 % (ref 42–75)
NRBC BLD AUTO-RTO: 0 % (ref 0–0)
O2 CAP BLDA-SCNC: 16.1 ML/DL (ref 16–24)
O2 CT BLDA-SCNC: 14.7 ML/DL (ref 15–23)
PH BLDA: 7.27 [PH] (ref 7.35–7.45)
PLATELET # BLD EST: NORMAL 10*3/UL
PLATELET # BLD: 181 K/UL (ref 130–400)
PMV BLD AUTO: 7.6 FL (ref 7.2–11.7)
PO2 BLDA: 62 MM/HG (ref 80–100)
RBC # BLD AUTO: 3.35 MIL/UL (ref 4.4–5.9)
TOTAL CELLS COUNTED BLD: 100
WBC # BLD AUTO: 11 K/UL (ref 4.8–10.8)

## 2015-12-12 RX ADMIN — WATER SCH MLS/HR: 1 INJECTION INTRAMUSCULAR; INTRAVENOUS; SUBCUTANEOUS at 19:52

## 2015-12-12 RX ADMIN — Medication SCH ML: at 09:01

## 2015-12-12 RX ADMIN — NITROGLYCERIN SCH IN: 20 OINTMENT TOPICAL at 06:34

## 2015-12-12 RX ADMIN — WATER SCH MLS/HR: 1 INJECTION INTRAMUSCULAR; INTRAVENOUS; SUBCUTANEOUS at 16:05

## 2015-12-12 RX ADMIN — WATER SCH MLS/HR: 1 INJECTION INTRAMUSCULAR; INTRAVENOUS; SUBCUTANEOUS at 09:02

## 2015-12-12 RX ADMIN — NITROGLYCERIN SCH IN: 20 OINTMENT TOPICAL at 09:01

## 2015-12-12 RX ADMIN — WATER SCH MLS/HR: 1 INJECTION INTRAMUSCULAR; INTRAVENOUS; SUBCUTANEOUS at 01:38

## 2015-12-12 RX ADMIN — NITROGLYCERIN SCH IN: 20 OINTMENT TOPICAL at 16:03

## 2015-12-12 RX ADMIN — NITROGLYCERIN SCH IN: 20 OINTMENT TOPICAL at 21:56

## 2015-12-12 RX ADMIN — ENOXAPARIN SODIUM SCH MG: 40 INJECTION SUBCUTANEOUS at 09:01

## 2015-12-12 NOTE — PN
DATE: 12/12/2015



LOCATION:  The patient is in ICU, bed 434.



TIME SPENT:  35 minutes.



Events overnight reviewed and discussed with ____.  The patient is back on 
Diprivan drip, as he was noted to be agitated and ____ remains off Lopressor.  
Telemetry sinus rhythm rate in the low 110s.  Sedated on Diprivan drip on ____ 
14, tidal volume 600, FiO2 of 55% and PEEP of 5 minute ventilation at 11.1 
liters, respiratory rate 18.  Exhaled tidal volume 614, and saturating 100%.  
End tidal CO2 of 18, peak airway pressure 20.



PHYSICAL EXAMINATION:

VITAL SIGNS:  Temperature 99.4, heart rate 114, blood pressure 153/101, pulse 
ox of 99.  Intake 26,432, output 2,900, positive fluid balance 3532.

HEENT:  No icterus, no nystagmus.  Pupils are 2-3 mm and poorly reactive.  No 
gaze preference.

NECK:  No visible jugular venous distension.  Supple.  No carotid bruit.

CHEST:  Bilateral breath sounds diminished in intensity.

HEART:  Rhythm regular.  S1, S2.  Soft.  No audible murmur or rub.

ABDOMEN:  Bowel sounds present, soft, bowel sounds hypoactive.

EXTREMITIES:  No edema, discoloration on the right leg.

NEUROLOGIC:  Positive tone.  no response, otherwise, flaccid legs.

SKIN:  Without rash.



Chest x-ray:  Endotracheal tube in place.  No pneumothorax noted, extensive 
bilateral perihilar and infrahilar infiltrate.



CURRENT LABORATORY DATA:  WBC 11, hemoglobin 11.1, hematocrit 35.7, .6, 
platelet count 181.  Neutrophils 85, lymphocytes 3.8, monocytes 11.  ABG, pH 
7.27, pCO2 59, pO2 62, oxygen saturation 91.3 on FIO2 55%.  SMA-7:  Sodium 141, 
potassium 4.2, chloride 110, CO2 of 26, BUN 13, creatinine 1.1, glucose 130, 
calcium 9.  Urinalysis, wbc's 5-10.



CURRENT MEDICATIONS:  Enalapril 10 mg p.o. daily, Pepcid 20 mg b.i.d., 
lorazepam 2 mg IV q. 6 hours p.r.n., morphine 4 mg IV q. 2 hours p.r.n., 
multivitamin one 5 mL p.o. daily, nitroglycerin b.i.d. 2 inch topically, Zosyn 
3.375 g IV q. 6 hours, Diprivan at 5 mcg per kg per minute titrating to 
sedation scale  vancomycin 1 gram IV q. 2 hours.



MICROBIOLOGY:  Blood culture, no growth reported.  Wound culture, Klebsiella 
pneumoniae.  Urine culture, Klebsiella pneumoniae.



IMPRESSION:  Status post cardiac arrest with resuscitation from ventricular 
tachycardia; post-resuscitation therapeutic hypothermia completed, remains in 
anoxic encephalopathy.  Chest x-ray with bilateral perihilar infiltrate 
suggesting fluid overload and/or aspiration pneumonia, scrotal swelling, rule 
out infection, inflammation/mass.



PLAN:  

1.  Continue propofol.  Wean off the sedation to assess the response.

2.  Increase an FIO2 to 60% to maintain a pO2 of 70 or above. antibiotics with 
vancomycin and Zosyn to cover for possible aspiration pneumonia and/or the UTI 
with Klebsiella pneumoniae.  Continue NG tube feeding.  DVT and 
gastrointestinal prophylaxis.  Continue Keppra.  Neurology followup, possible 
EEG.





__________________________________________

Samuel Courtney MD







cc:   



DD: 12/12/2015 09:51:18  170

TT: 12/12/2015 12:11:20

Job # [id number]

an

MTDD

## 2015-12-12 NOTE — CARD
--------------- APPROVED REPORT --------------





EXAM: Two-dimensional and M-mode echocardiogram with Doppler and 

color Doppler.



Other Information 

Quality : GoodRhythm : NSR



INDICATION

Cardiac Arrest



2D DIMENSIONS 

IVSd1.16   (0.7-1.1cm)LVDd5.39   (3.9-5.9cm)

LVOT Diameter1.79   (1.8-2.4cm)PWd1.13   (0.7-1.1cm)

IVSs1.82   (0.8-1.2cm)LA [id number]   (18-58mL)

LVDs4.21   (2.5-4.0cm)FS (%) 21.9   %

PWs1.18   (0.8-1.2cm)SV56.83   ml

LVEF (%)39.3   (>50%)CO6.11   L/min



M-Mode DIMENSIONS 

Left Atrium (MM)3.52   (2.5-4.0cm)IVSd1.35   (0.7-1.1cm)

Aortic Root2.74   (2.2-3.7cm)LVDd5.62   (4.0-5.6cm)

Aortic Cusp Exc.1.81   (1.5-2.0cm)PWd1.03   (0.7-1.1cm)

IVSs1.49   cmFS (%) 18   %

LVDs4.62   (2.0-3.8cm)PWs1.14   cm



Tricuspid Valve

TR Peak [id number] [id number] Peak Gr.30mmHg

[id number]



LEFT VENTRICLE 

The left ventricle is normal size.

There is borderline concentric left ventricular hypertrophy.

The systolic function is moderately impaired.

Mild septal hypokinesis

Transmitral Doppler flow pattern is Grade I-abnormal relaxation 

pattern.



RIGHT VENTRICLE 

The right ventricle is normal size.

There is normal right ventricular wall thickness.

The right ventricular systolic function is normal.



ATRIA 

The left atrium size is normal.

The right atrium size is normal.



AORTIC VALVE 

The aortic valve is moderately thickened but opens well.

No aortic regurgitation is present.

There is no aortic valvular stenosis. 



MITRAL VALVE 

The mitral valve is moderately thickened.

There is no mitral valve stenosis.

Mitral regurgitation is trace.



TRICUSPID VALVE 

The tricuspid valve is normal in structure and function.

There is mild pulmonary hypertension.



PULMONIC VALVE 

The pulmonary valve is normal in structure and function.

There is no pulmonic valvular regurgitation. 



GREAT VESSELS 

The aortic root is normal in size.

The IVC collapses <50% with inspiration.



PERICARDIAL EFFUSION 

The pericardium appears normal.



<Conclusion>

There is borderline concentric left ventricular hypertrophy.

The systolic function is moderately impaired.

Mild septal hypokinesis

Transmitral Doppler flow pattern is Grade I-abnormal relaxation 

pattern.

## 2015-12-12 NOTE — RAD
Chest dated 12/12/2015. 



History: Intubated. 



Single AP supine portable view of the chest performed and compared 

with prior study 12/11/2015. 



Findings: The current study reveals in situ ETT, tip of which lies 

approximately 5.8 cm above carinal. In situ NGT, tip of which has not 

been included on this film though distal aspect does lie below EG 

junction.  Diffuse bilateral infiltrates.  Rule out pulmonary 

edema/CHF versus and or pneumonia. 



The impression: ETT and NGT as described.  Diffuse bilateral 

infiltrates.

## 2015-12-12 NOTE — CP.PCM.PN
Subjective





- Subjective


Subjective: 


pt remains sedated for agitation when propofol d/c.  while pt is on propofol 

there is no response.  per rn no purposeful movement noted and pt does not 

follow even the simplest of commands


b/w and imaging noted. 





Review of Systems





- Review of Systems


Systems not reviewed;Unavailable: Intubated





- Constitutional


Constitutional: UN





- EENT


Eyes: UNREMARKABLE


Ears: UNREMARKABLE


Nose/Mouth/Throat: UNREMARKABLE





- Cardiovascular


Cardiovascular: UNREMARKABLE





- Respiratory


Respiratory: As Per HPI, Excessive Mucous Production





- Gastrointestinal


Gastrointestinal: UNREMARKABLE





- Genitourinary


Genitourinary: UNREMARKABLE





- Reproductive: Male


Reproductive:Male: UNREMARKABLE





- Musculoskeletal


Musculoskeletal: UNREMARKABLE





- Integumentary


Integumentary: UNREMARKABLE





- Neurological


Neurological: UNREMARKABLE





- Psychiatric


Psychiatric: UNREMARKABLE





- Endocrine


Endocrine: UNREMARKABLE





- Hematologic/Lymphatic


Hematologic: UNREMARKABLE





Objective





- Vital Signs/Intake and Output


Vital Signs (last 24 hours): 


 Vital Signs - 24 hr











  12/11/15 12/11/15 12/11/15





  19:47 20:00 22:00


 


Temperature 99.1 F  99.9 F H


 


Pulse Rate 120 H 117 H 123 H


 


Respiratory 20 15 19





Rate   


 


Blood Pressure 177/123 H 133/96 H 122/69


 


O2 Sat by Pulse 97 100 99





Oximetry   














  12/11/15 12/12/15 12/12/15





  23:00 00:00 00:45


 


Temperature 100.4 F H 100.5 F H 


 


Pulse Rate 136 H 145 H 145 H


 


Respiratory 28 H 28 H 21





Rate   


 


Blood Pressure 140/111 H 184/117 H 208/153 H


 


O2 Sat by Pulse 94 L 97 92 L





Oximetry   














  12/12/15 12/12/15 12/12/15





  00:55 01:03 01:49


 


Temperature   100 F H


 


Pulse Rate 125 H 121 H 113 H


 


Respiratory 19 20 15





Rate   


 


Blood Pressure 155/97 H 149/95 H 166/100 H


 


O2 Sat by Pulse 97 98 98





Oximetry   














  12/12/15 12/12/15 12/12/15





  02:49 04:00 05:03


 


Temperature  99.7 F H 


 


Pulse Rate 121 H 155 H 155 H


 


Respiratory 25 H  23





Rate   


 


Blood Pressure 139/90 125/36 L 116/69


 


O2 Sat by Pulse 95 95 93 L





Oximetry   














  12/12/15 12/12/15 12/12/15





  06:00 06:55 08:00


 


Temperature   99.4 F


 


Pulse Rate 112 H 112 H 114 H


 


Respiratory 17 18 21





Rate   


 


Blood Pressure 129/80 129/80 153/101 H


 


O2 Sat by Pulse 98 99 99





Oximetry   














  12/12/15 12/12/15 12/12/15





  10:00 12:00 12:39


 


Temperature  101.6 F H 


 


Pulse Rate 108 H 114 H 


 


Respiratory 17 16 





Rate   


 


Blood Pressure 118/70 114/62 114/62


 


O2 Sat by Pulse 100 100 





Oximetry   














  12/12/15 12/12/15 12/12/15





  14:00 16:00 17:58


 


Temperature 101.6 F H 100.3 F H 100.1 F H


 


Pulse Rate 106 H 98 H 116 H


 


Respiratory 16 16 19





Rate   


 


Blood Pressure 140/66 151/90 H 129/81


 


O2 Sat by Pulse 100 100 100





Oximetry   











Intake and Output (last 12 hours): 


 Intake & Output











 12/11/15 12/12/15 12/12/15





 18:59 06:59 18:59


 


Intake Total 2610 3822 2675


 


Output Total 1100 1800 3400


 


Balance 1510 2022 -725


 


Intake:   


 


  IV 1350 2553 1275


 


  Intake, Piggyback 300 474 450


 


  Oral 440 495 


 


  Tube Feeding 220  550


 


  Free Water Flush 300 300 400


 


Output:   


 


  Urine 1100 1800 3400


 


    Urethral (Lua) 1100 1800 3400


 


Other:   


 


  # Bowel Movements  1 1














- Medications


Medications: 


 Current Medications





Enalapril Maleate (Vasotec)  10 mg PO DAILY [facility]


   Last Admin: 12/12/15 09:02 Dose:  10 mg


Famotidine (Pepcid)  20 mg GT BID [facility]


   Last Admin: 12/12/15 16:04 Dose:  20 mg


Piperacillin Sod/Tazobactam (Sod 3.375 gm/ Sodium Chloride)  100 mls @ 100 mls/

hr IVPB Q6H [facility]


   Last Admin: 12/12/15 16:05 Dose:  100 mls/hr


Vancomycin HCl 1 gm/ Sodium (Chloride)  250 mls @ 125 mls/hr IVPB Q12 [facility]


   Last Admin: 12/12/15 09:02 Dose:  125 mls/hr


Propofol (Diprivan)  100 mls @ 2.041 mls/hr IV .Q24H [facility]; 5 MCG/KG/MIN


   PRN Reason: Protocol


   Stop: 12/13/15 03:28


   Last Admin: 12/12/15 03:54 Dose:  2.041 mls/hr


Sodium Chloride (Sodium Chloride 0.9%)  1,000 mls @ 75 mls/hr IV .[id number] [facility]


   Stop: 12/13/15 14:32


   Last Admin: 12/12/15 16:04 Dose:  75 mls/hr


Insulin Human Regular (Humulin R)  0 units SC ACHS [facility]


   PRN Reason: Protocol


   Last Admin: 12/12/15 17:50 Dose:  Not Given


Lorazepam (Ativan)  2 mg IVP Q6 PRN


   PRN Reason: Agitation


   Last Admin: 12/11/15 19:46 Dose:  2 mg


Morphine Sulfate (Morphine)  4 mg IVP Q2H PRN


   PRN Reason: Pain, moderate (4-7)


   Last Admin: 12/11/15 23:55 Dose:  4 mg


Multivitamins/Vitamin C (Multi-Delyn Liquid)  5 ml PO DAILY [facility]


   Last Admin: 12/12/15 09:01 Dose:  5 ml


Nitroglycerin (Nitro-Bid 2% Oint)  2 in TOP Q6 [facility]


   Last Admin: 12/12/15 16:03 Dose:  2 in











- Labs


Labs (last 24 hours): 


 Laboratory Results - last 24 hr











  12/11/15 12/12/15 12/12/15





  21:40 04:30 04:32


 


WBC   11.0 H 


 


RBC   3.35 L 


 


Hgb   11.1 L 


 


Hct   35.7 


 


MCV   106.6 H 


 


MCH   33.1 H 


 


MCHC   31.1 L 


 


RDW   14.1 


 


Plt Count   181 


 


MPV   7.6 


 


Neut % (Auto)   85.0 H 


 


Lymph % (Auto)   3.8 L 


 


Mono % (Auto)   11.0 H 


 


Eos % (Auto)   0.1 


 


Baso % (Auto)   0.1 


 


Neut #   9.3 H 


 


Lymph #   0.4 L 


 


Mono #   1.2 H 


 


Eos #   0.0 


 


Baso #   0.0 


 


Neutrophils % (Manual)   86 H 


 


Lymphocytes % (Manual)   4 L 


 


Monocytes % (Manual)   10 


 


Platelet Estimate   Normal 


 


pCO2    59 H


 


pO2    62 L


 


HCO3    24.2


 


ABG pH    7.27 L


 


ABG Total CO2    28.9 H


 


ABG O2 Saturation    91.3 L


 


ABG O2 Content    14.7 L


 


ABG Base Excess    -0.7


 


ABG Hemoglobin    11.7


 


ABG Carboxyhemoglobin    2.0 H


 


POC ABG HHb (Measured)    8.5 H


 


ABG Methemoglobin    0.3


 


ABG O2 Capacity    16.1


 


Chu Test    Yes


 


A-a O2 Difference    256.0


 


Hgb O2 Saturation    89.1 L


 


FiO2    55.0


 


Sodium   145 


 


Potassium   4.2 


 


Chloride   110 H 


 


Carbon Dioxide   26 


 


Anion Gap   13 


 


BUN   15 


 


Creatinine   1.1 


 


Est GFR ( Amer)   > 60 


 


Est GFR (Non-Af Amer)   > 60 


 


POC Glucose (mg/dL)  95  


 


Random Glucose   133 H 


 


Calcium   9.0 














  12/12/15 12/12/15 12/12/15





  05:02 12:38 17:48


 


WBC   


 


RBC   


 


Hgb   


 


Hct   


 


MCV   


 


MCH   


 


MCHC   


 


RDW   


 


Plt Count   


 


MPV   


 


Neut % (Auto)   


 


Lymph % (Auto)   


 


Mono % (Auto)   


 


Eos % (Auto)   


 


Baso % (Auto)   


 


Neut #   


 


Lymph #   


 


Mono #   


 


Eos #   


 


Baso #   


 


Neutrophils % (Manual)   


 


Lymphocytes % (Manual)   


 


Monocytes % (Manual)   


 


Platelet Estimate   


 


pCO2   


 


pO2   


 


HCO3   


 


ABG pH   


 


ABG Total CO2   


 


ABG O2 Saturation   


 


ABG O2 Content   


 


ABG Base Excess   


 


ABG Hemoglobin   


 


ABG Carboxyhemoglobin   


 


POC ABG HHb (Measured)   


 


ABG Methemoglobin   


 


ABG O2 Capacity   


 


Chu Test   


 


A-a O2 Difference   


 


Hgb O2 Saturation   


 


FiO2   


 


Sodium   


 


Potassium   


 


Chloride   


 


Carbon Dioxide   


 


Anion Gap   


 


BUN   


 


Creatinine   


 


Est GFR ( Amer)   


 


Est GFR (Non-Af Amer)   


 


POC Glucose (mg/dL)  130 H  86  104


 


Random Glucose   


 


Calcium   














- Constitutional


Appears: Non-toxic, No Acute Distress, Chronically Ill





- Head Exam


Head Exam: ATRAUMATIC, NORMAL INSPECTION, NORMOCEPHALIC





- Respiratory Exam


Respiratory Exam: Rhonchi


Additional comments: 


vented, fidufse rhonchi/congestion heard





- Cardiovascular Exam


Cardiovascular Exam: Tachycardia, REGULAR RHYTHM, RRR





- GI/Abdominal Exam


GI & Abdominal Exam: Normal Bowel Sounds, Soft, Unremarkable





- Extremities Exam


Extremities exam: normal capillary refill, normal inspection, pedal pulses 

present





- Skin


Skin Exam: Dry, Intact, Normal Color, Warm





Assessment/Plan


(1) Cardiac arrest


Current Visit: Yes   Status: Acute


Comment: s/p acls proceudres, ST w/ periods of SB


cont amiodarone


echo


cardio consult


sedation gtt held nad prn sedation ordered by icu attending. 


cath when stable


cont cooling protocol


cont sedation, ivf


no cardiac intervention planned at present








(2) DVT prophylaxis


Current Visit: Yes   Status: Acute


Comment: lovenox








(3) Scrotal edema


Current Visit: Yes   Status: Acute


Comment: scrotal us-noted


urology consult-pending








(4) History of seizure


Current Visit: Yes   Status: Acute


Comment: neuro consult








(5) Diabetes mellitus with hyperglycemia


Current Visit: Yes   Status: Acute


Comment: riss per protocol








(6) Agitation


Current Visit: Yes   Status: Acute


Comment: cont propofol for agitation


unsure if this is lower brainstem activity vs seizure activity vs other etiology

## 2015-12-12 NOTE — CP.PCM.PN
Subjective





- Subjective


Subjective: 


no clinical change.





Review of Systems





- Review of Systems


Systems not reviewed;Unavailable: Intubated





Objective





- Vital Signs/Intake and Output


Vital Signs (last 24 hours): 


 Vital Signs - 24 hr











  12/11/15 12/11/15 12/11/15





  10:00 12:00 14:00


 


Temperature 100.2 F H 101.3 F H 99.2 F


 


Pulse Rate 108 H 156 H 123 H


 


Respiratory 14 17 15





Rate   


 


Blood Pressure 133/86 139/77 156/106 H


 


O2 Sat by Pulse 100 96 98





Oximetry   














  12/11/15 12/11/15 12/11/15





  16:00 17:58 18:15


 


Temperature 97.4 F L 97.5 F L 


 


Pulse Rate 117 H 123 H 


 


Respiratory 14 18 





Rate   


 


Blood Pressure 173/115 H  180/129 H


 


O2 Sat by Pulse 96 95 





Oximetry   














  12/11/15 12/11/15 12/11/15





  19:47 20:00 22:00


 


Temperature 99.1 F  99.9 F H


 


Pulse Rate 120 H 117 H 123 H


 


Respiratory 20 15 19





Rate   


 


Blood Pressure 177/123 H 133/96 H 122/69


 


O2 Sat by Pulse 97 100 99





Oximetry   














  12/11/15 12/12/15 12/12/15





  23:00 00:00 00:45


 


Temperature 100.4 F H 100.5 F H 


 


Pulse Rate 136 H 145 H 145 H


 


Respiratory 28 H 28 H 21





Rate   


 


Blood Pressure 140/111 H 184/117 H 208/153 H


 


O2 Sat by Pulse 94 L 97 92 L





Oximetry   














  12/12/15 12/12/15 12/12/15





  00:55 01:03 01:49


 


Temperature   100 F H


 


Pulse Rate 125 H 121 H 113 H


 


Respiratory 19 20 15





Rate   


 


Blood Pressure 155/97 H 149/95 H 166/100 H


 


O2 Sat by Pulse 97 98 98





Oximetry   














  12/12/15 12/12/15 12/12/15





  02:49 04:00 05:03


 


Temperature  99.7 F H 


 


Pulse Rate 121 H 155 H 155 H


 


Respiratory 25 H  23





Rate   


 


Blood Pressure 139/90 125/36 L 116/69


 


O2 Sat by Pulse 95 95 93 L





Oximetry   














  12/12/15 12/12/15 12/12/15





  06:00 06:55 08:00


 


Temperature   99.4 F


 


Pulse Rate 112 H 112 H 114 H


 


Respiratory 17 18 21





Rate   


 


Blood Pressure 129/80 129/80 153/101 H


 


O2 Sat by Pulse 98 99 99





Oximetry   











Intake and Output (last 12 hours): 


 Intake & Output











 12/11/15 12/12/15 12/12/15





 18:59 06:59 18:59


 


Intake Total 2610 3822 510


 


Output Total 1100 1800 


 


Balance 1510 2022 510


 


Intake:   


 


  IV 1350 2553 300


 


  Intake, Piggyback 300 474 


 


  Oral 440 495 


 


  Tube Feeding 220  110


 


  Free Water Flush 300 300 100


 


Output:   


 


  Urine 1100 1800 


 


    Urethral (Lua) 1100 1800 


 


Other:   


 


  # Bowel Movements  1 














- Medications


Medications: 


 Current Medications





Enalapril Maleate (Vasotec)  10 mg PO DAILY [facility]


   Last Admin: 12/12/15 09:02 Dose:  10 mg


Famotidine (Pepcid)  20 mg GT BID [facility]


   Last Admin: 12/12/15 09:01 Dose:  20 mg


Piperacillin Sod/Tazobactam (Sod 3.375 gm/ Sodium Chloride)  100 mls @ 100 mls/

hr IVPB Q6H [facility]


   Last Admin: 12/12/15 09:02 Dose:  100 mls/hr


Vancomycin HCl 1 gm/ Sodium (Chloride)  250 mls @ 125 mls/hr IVPB Q12 [facility]


   Last Admin: 12/12/15 09:02 Dose:  125 mls/hr


Propofol (Diprivan)  100 mls @ 2.041 mls/hr IV .Q24H [facility]; 5 MCG/KG/MIN


   PRN Reason: Protocol


   Stop: 12/13/15 03:28


   Last Admin: 12/12/15 03:54 Dose:  2.041 mls/hr


Insulin Human Regular (Humulin R)  0 units SC ACHS [facility]


   PRN Reason: Protocol


   Last Admin: 12/12/15 06:33 Dose:  2 units


Lorazepam (Ativan)  2 mg IVP Q6 PRN


   PRN Reason: Agitation


   Last Admin: 12/11/15 19:46 Dose:  2 mg


Morphine Sulfate (Morphine)  4 mg IVP Q2H PRN


   PRN Reason: Pain, moderate (4-7)


   Last Admin: 12/11/15 23:55 Dose:  4 mg


Multivitamins/Vitamin C (Multi-Delyn Liquid)  5 ml PO DAILY [facility]


   Last Admin: 12/12/15 09:01 Dose:  5 ml


Nitroglycerin (Nitro-Bid 2% Oint)  2 in TOP Q6 [facility]


   Last Admin: 12/12/15 09:01 Dose:  2 in











- Labs


Labs (last 24 hours): 


 Laboratory Results - last 24 hr











  12/09/15 12/11/15 12/11/15





  15:30 10:52 21:40


 


WBC   


 


RBC   


 


Hgb   


 


Hct   


 


MCV   


 


MCH   


 


MCHC   


 


RDW   


 


Plt Count   


 


MPV   


 


Neut % (Auto)   


 


Lymph % (Auto)   


 


Mono % (Auto)   


 


Eos % (Auto)   


 


Baso % (Auto)   


 


Neut #   


 


Lymph #   


 


Mono #   


 


Eos #   


 


Baso #   


 


Neutrophils % (Manual)   


 


Lymphocytes % (Manual)   


 


Monocytes % (Manual)   


 


Platelet Estimate   


 


pCO2   


 


pO2   


 


HCO3   


 


ABG pH   


 


ABG Total CO2   


 


ABG O2 Saturation   


 


ABG O2 Content   


 


ABG Base Excess   


 


ABG Hemoglobin   


 


ABG Carboxyhemoglobin   


 


POC ABG HHb (Measured)   


 


ABG Methemoglobin   


 


ABG O2 Capacity   


 


Chu Test   


 


A-a O2 Difference   


 


Hgb O2 Saturation   


 


FiO2   


 


Sodium   


 


Potassium   


 


Chloride   


 


Carbon Dioxide   


 


Anion Gap   


 


BUN   


 


Creatinine   


 


Est GFR ( Amer)   


 


Est GFR (Non-Af Amer)   


 


POC Glucose (mg/dL)   95  95


 


Random Glucose   


 


Calcium   


 


Ionized Calcium  4.5 L  














  12/12/15 12/12/15 12/12/15





  04:30 04:32 05:02


 


WBC  11.0 H  


 


RBC  3.35 L  


 


Hgb  11.1 L  


 


Hct  35.7  


 


MCV  106.6 H  


 


MCH  33.1 H  


 


MCHC  31.1 L  


 


RDW  14.1  


 


Plt Count  181  


 


MPV  7.6  


 


Neut % (Auto)  85.0 H  


 


Lymph % (Auto)  3.8 L  


 


Mono % (Auto)  11.0 H  


 


Eos % (Auto)  0.1  


 


Baso % (Auto)  0.1  


 


Neut #  9.3 H  


 


Lymph #  0.4 L  


 


Mono #  1.2 H  


 


Eos #  0.0  


 


Baso #  0.0  


 


Neutrophils % (Manual)  86 H  


 


Lymphocytes % (Manual)  4 L  


 


Monocytes % (Manual)  10  


 


Platelet Estimate  Normal  


 


pCO2   59 H 


 


pO2   62 L 


 


HCO3   24.2 


 


ABG pH   7.27 L 


 


ABG Total CO2   28.9 H 


 


ABG O2 Saturation   91.3 L 


 


ABG O2 Content   14.7 L 


 


ABG Base Excess   -0.7 


 


ABG Hemoglobin   11.7 


 


ABG Carboxyhemoglobin   2.0 H 


 


POC ABG HHb (Measured)   8.5 H 


 


ABG Methemoglobin   0.3 


 


ABG O2 Capacity   16.1 


 


Chu Test   Yes 


 


A-a O2 Difference   256.0 


 


Hgb O2 Saturation   89.1 L 


 


FiO2   55.0 


 


Sodium  145  


 


Potassium  4.2  


 


Chloride  110 H  


 


Carbon Dioxide  26  


 


Anion Gap  13  


 


BUN  15  


 


Creatinine  1.1  


 


Est GFR ( Amer)  > 60  


 


Est GFR (Non-Af Amer)  > 60  


 


POC Glucose (mg/dL)    130 H


 


Random Glucose  133 H  


 


Calcium  9.0  


 


Ionized Calcium   














- Constitutional


Appears: Toxic





- Head Exam


Head Exam: NORMAL INSPECTION





- Eye Exam


Eye Exam: Normal appearance





- ENT Exam


ENT Exam: Mucous Membranes Dry





- Neck Exam


Neck exam: Full Rom





- Respiratory Exam


Respiratory Exam: Decreased Breath Sounds





- Cardiovascular Exam


Cardiovascular Exam: Tachycardia





- GI/Abdominal Exam


GI & Abdominal Exam: Diminished Bowel Sounds





- Rectal Exam


Rectal Exam: Deferred





- Extremities Exam


Extremities exam: pedal edema





- Back Exam


Back exam: NORMAL INSPECTION





- Skin


Skin Exam: Normal Color





Assessment/Plan


(1) Cardiac arrest


Assessment and plan: 


unclear etiology.  Recommend serial cardiac enzymes.  Check troponin.  check 

echocardiogram. 





Patient has no current mental status.  I advise conservative medical therapy. 

No plans for cardiac cath.








Current Visit: Yes   Status: Acute





(2) NSTEMI (non-ST elevated myocardial infarction)


Assessment and plan: 


Patient may have underlying CAD as a cause of his acute event and cardiac 

arrest.  





Conservative therapy.


Current Visit: Yes   Status: Acute

## 2015-12-13 LAB
ARTERIAL BLOOD GAS HEMOGLOBIN: 10.2 G/DL (ref 11.7–17.4)
ARTERIAL BLOOD GAS HEMOGLOBIN: 10.9 G/DL (ref 11.7–17.4)
ARTERIAL BLOOD GAS O2 SAT: 98.5 % (ref 95–98)
ARTERIAL BLOOD GAS O2 SAT: 98.6 % (ref 95–98)
ARTERIAL BLOOD GAS PCO2: 45 MM/HG (ref 35–45)
ARTERIAL BLOOD GAS PCO2: 49 MM/HG (ref 35–45)
ARTERIAL BLOOD GAS TCO2: 33.4 MMOL/L (ref 22–28)
ARTERIAL BLOOD GAS TCO2: 34 MMOL/L (ref 22–28)
ARTERIAL PATENCY WRIST A: YES
ARTERIAL PATENCY WRIST A: YES
BASOPHILS # BLD AUTO: 0 K/UL (ref 0–0.2)
BASOPHILS NFR BLD: 0.4 % (ref 0–2)
BUN SERPL-MCNC: 14 MG/DL (ref 9–20)
CALCIUM SERPL-MCNC: 9.1 MG/DL (ref 8.4–10.2)
EOSINOPHIL # BLD AUTO: 0.1 K/UL (ref 0–0.7)
EOSINOPHIL NFR BLD: 0.8 % (ref 0–4)
ERYTHROCYTE [DISTWIDTH] IN BLOOD BY AUTOMATED COUNT: 14 % (ref 11.5–14.5)
GFR NON-AFRICAN AMERICAN: > 60
HCO3 BLDA-SCNC: 30.5 MMOL/L (ref 21–28)
HCO3 BLDA-SCNC: 30.6 MMOL/L (ref 21–28)
HGB BLD-MCNC: 10.9 G/DL (ref 12–18)
INHALED O2 CONCENTRATION: 60 %
INHALED O2 CONCENTRATION: 60 %
LYMPHOCYTES # BLD AUTO: 1.3 K/UL (ref 1–4.3)
LYMPHOCYTES NFR BLD AUTO: 11.4 % (ref 20–40)
MCH RBC QN AUTO: 32.5 PG (ref 27–31)
MCHC RBC AUTO-ENTMCNC: 30.7 G/DL (ref 33–37)
MCV RBC AUTO: 105.9 FL (ref 80–94)
MONOCYTES # BLD: 1.7 K/UL (ref 0–0.8)
MONOCYTES NFR BLD: 14.7 % (ref 0–10)
NEUTROPHILS # BLD: 8.3 K/UL (ref 1.8–7)
NEUTROPHILS NFR BLD AUTO: 72.7 % (ref 50–75)
NRBC BLD AUTO-RTO: 0 % (ref 0–0)
O2 CAP BLDA-SCNC: 14.3 ML/DL (ref 16–24)
O2 CAP BLDA-SCNC: 15.2 ML/DL (ref 16–24)
O2 CT BLDA-SCNC: 14.1 ML/DL (ref 15–23)
O2 CT BLDA-SCNC: 15 ML/DL (ref 15–23)
PH BLDA: 7.43 [PH] (ref 7.35–7.45)
PH BLDA: 7.46 [PH] (ref 7.35–7.45)
PLATELET # BLD: 219 K/UL (ref 130–400)
PMV BLD AUTO: 7.6 FL (ref 7.2–11.7)
PO2 BLDA: 127 MM/HG (ref 80–100)
PO2 BLDA: 147 MM/HG (ref 80–100)
RBC # BLD AUTO: 3.37 MIL/UL (ref 4.4–5.9)
WBC # BLD AUTO: 11.4 K/UL (ref 4.8–10.8)

## 2015-12-13 RX ADMIN — NITROGLYCERIN SCH IN: 20 OINTMENT TOPICAL at 05:54

## 2015-12-13 RX ADMIN — WATER SCH MLS/HR: 1 INJECTION INTRAMUSCULAR; INTRAVENOUS; SUBCUTANEOUS at 05:54

## 2015-12-13 RX ADMIN — WATER SCH MLS/HR: 1 INJECTION INTRAMUSCULAR; INTRAVENOUS; SUBCUTANEOUS at 11:31

## 2015-12-13 RX ADMIN — NITROGLYCERIN SCH IN: 20 OINTMENT TOPICAL at 08:58

## 2015-12-13 RX ADMIN — NITROGLYCERIN SCH IN: 20 OINTMENT TOPICAL at 21:50

## 2015-12-13 RX ADMIN — Medication SCH ML: at 08:55

## 2015-12-13 RX ADMIN — NITROGLYCERIN SCH IN: 20 OINTMENT TOPICAL at 17:22

## 2015-12-13 RX ADMIN — ACETAMINOPHEN PRN MG: 160 SOLUTION ORAL at 20:07

## 2015-12-13 RX ADMIN — ACETAMINOPHEN PRN MG: 160 SOLUTION ORAL at 14:03

## 2015-12-13 NOTE — CP.PCM.CON
History of Present Illness





- History of Present Illness


History of Present Illness: 


Patient is a 55M s/p cardiac arrest on 12/8/15.  Patient was resuscitated w/ 

ACLS protocols.  Currently he is intubated and sedated on propofol drip.  

Surgery consulted for tracheostomy placement.





Review of Systems





- Review of Systems


Systems not reviewed;Unavailable: Intubated





Past Patient History





- Past Medical History & Family History


Past Medical History?: Yes





- Past Social History


Smoking Status: Heavy Smoker > 10 Cigarettes Daily





- CARDIAC


Hx Cardiac Disorders:  (unknown)





- NEUROLOGICAL


Hx Neurological Disorder: Yes (seizure)


Hx Seizures: Yes





- HEENT


Hx HEENT Problems: No





- RENAL


Hx Chronic Kidney Disease: No





- ENDOCRINE/METABOLIC


Hx Endocrine Disorders: No





- HEMATOLOGICAL/ONCOLOGICAL


Hx Blood Disorders: No





- INTEGUMENTARY


Hx Dermatological Problems: No





- MUSCULOSKELETAL/RHEUMATOLOGICAL


Hx Falls: No





- GENITOURINARY/GYNECOLOGICAL


Hx Genitourinary Disorders: No





- PSYCHIATRIC


Hx Substance Use: No





Meds


Allergies/Adverse Reactions: 


 Allergies











Allergy/AdvReac Type Severity Reaction Status Date / Time


 


Unobtainable Allergy   Verified 12/08/15 09:37














- Medications


Medications: 


 Current Medications





Enalapril Maleate (Vasotec)  10 mg PO DAILY [facility]


   Last Admin: 12/12/15 09:02 Dose:  10 mg


Famotidine (Pepcid)  20 mg GT BID [facility]


   Last Admin: 12/12/15 16:04 Dose:  20 mg


Piperacillin Sod/Tazobactam (Sod 3.375 gm/ Sodium Chloride)  100 mls @ 100 mls/

hr IVPB Q6H [facility]


   Last Admin: 12/13/15 05:54 Dose:  100 mls/hr


Vancomycin HCl 1 gm/ Sodium (Chloride)  250 mls @ 125 mls/hr IVPB Q12 [facility]


   Last Admin: 12/12/15 20:00 Dose:  125 mls/hr


Sodium Chloride (Sodium Chloride 0.9%)  1,000 mls @ 75 mls/hr IV .[id number] [facility]


   Stop: 12/13/15 14:32


   Last Admin: 12/13/15 05:54 Dose:  75 mls/hr


Insulin Human Regular (Humulin R)  0 units SC ACHS [facility]


   PRN Reason: Protocol


   Last Admin: 12/13/15 06:43 Dose:  Not Given


Lorazepam (Ativan)  2 mg IVP Q6 PRN


   PRN Reason: Agitation


   Last Admin: 12/13/15 08:26 Dose:  2 mg


Morphine Sulfate (Morphine)  4 mg IVP Q2H PRN


   PRN Reason: Pain, moderate (4-7)


   Last Admin: 12/13/15 08:26 Dose:  4 mg


Multivitamins/Vitamin C (Multi-Delyn Liquid)  5 ml PO DAILY [facility]


   Last Admin: 12/12/15 09:01 Dose:  5 ml


Nitroglycerin (Nitro-Bid 2% Oint)  2 in TOP Q6 [facility]


   Last Admin: 12/13/15 05:54 Dose:  2 in











Physical Exam





- Constitutional


Appears: Non-toxic, No Acute Distress, Chronically Ill





- Head Exam


Head Exam: ATRAUMATIC, NORMAL INSPECTION





- Respiratory Exam


Additional comments: 


On vent.  + rales





- Cardiovascular Exam


Cardiovascular Exam: REGULAR RHYTHM





- Extremities Exam


Extremities exam: Positive for: normal capillary refill, normal inspection





Results





- Vital Signs


Recent Vital Signs: 


 Last Vital Signs











Temp  100.6 F H  12/13/15 08:00


 


Pulse  112 H  12/13/15 08:00


 


Resp  21   12/13/15 08:00


 


BP  144/90   12/13/15 08:00


 


Pulse Ox  98   12/13/15 08:00














- Labs


Result Diagrams: 


 12/13/15 04:30





 12/13/15 04:30


Labs: 


 Laboratory Results - last 24 hr











  12/12/15 12/12/15 12/12/15





  12:38 17:48 21:55


 


WBC   


 


RBC   


 


Hgb   


 


Hct   


 


MCV   


 


MCH   


 


MCHC   


 


RDW   


 


Plt Count   


 


MPV   


 


Neut % (Auto)   


 


Lymph % (Auto)   


 


Mono % (Auto)   


 


Eos % (Auto)   


 


Baso % (Auto)   


 


Neut #   


 


Lymph #   


 


Mono #   


 


Eos #   


 


Baso #   


 


pCO2   


 


pO2   


 


HCO3   


 


ABG pH   


 


ABG Total CO2   


 


ABG O2 Saturation   


 


ABG O2 Content   


 


ABG Base Excess   


 


ABG Hemoglobin   


 


ABG Carboxyhemoglobin   


 


POC ABG HHb (Measured)   


 


ABG Methemoglobin   


 


ABG O2 Capacity   


 


Chu Test   


 


A-a O2 Difference   


 


Hgb O2 Saturation   


 


FiO2   


 


Crit Value Called To   


 


Crit Value Read Back   


 


Blood Gas Notified Time   


 


Sodium   


 


Potassium   


 


Chloride   


 


Carbon Dioxide   


 


Anion Gap   


 


BUN   


 


Creatinine   


 


Est GFR ( Amer)   


 


Est GFR (Non-Af Amer)   


 


POC Glucose (mg/dL)  86  104  104


 


Random Glucose   


 


Calcium   














  12/13/15 12/13/15 12/13/15





  04:30 04:40 05:42


 


WBC  11.4 H  


 


RBC  3.37 L  


 


Hgb  10.9 L  


 


Hct  35.6  


 


MCV  105.9 H  


 


MCH  32.5 H  


 


MCHC  30.7 L  


 


RDW  14.0  


 


Plt Count  219  


 


MPV  7.6  


 


Neut % (Auto)  72.7  


 


Lymph % (Auto)  11.4 L  


 


Mono % (Auto)  14.7 H  


 


Eos % (Auto)  0.8  


 


Baso % (Auto)  0.4  


 


Neut #  8.3 H  


 


Lymph #  1.3  


 


Mono #  1.7 H  


 


Eos #  0.1  


 


Baso #  0.0  


 


pCO2   45 


 


pO2   147 H 


 


HCO3   30.6 H 


 


ABG pH   7.46 H 


 


ABG Total CO2   33.4 H 


 


ABG O2 Saturation   98.5 H 


 


ABG O2 Content   14.1 L 


 


ABG Base Excess   7.3 H 


 


ABG Hemoglobin   10.2 L 


 


ABG Carboxyhemoglobin   1.2 


 


POC ABG HHb (Measured)   1.5 


 


ABG Methemoglobin   1.0 


 


ABG O2 Capacity   14.3 L 


 


Chu Test   Yes 


 


A-a O2 Difference   225.0 


 


Hgb O2 Saturation   96.3 


 


FiO2   60.0 


 


Crit Value Called To   Aislinn bass 


 


Crit Value Read Back   Yes 


 


Blood Gas Notified Time   451 


 


Sodium  148  


 


Potassium  3.7  


 


Chloride  108 H  


 


Carbon Dioxide  29  


 


Anion Gap  15  


 


BUN  14  


 


Creatinine  1.1  


 


Est GFR ( Amer)  > 60  


 


Est GFR (Non-Af Amer)  > 60  


 


POC Glucose (mg/dL)    92


 


Random Glucose  99  


 


Calcium  9.1  














Assessment/Plan





- Assessment and Plan (Free Text)


Assessment: 


55M s/p cardiac arrest.  Sx consulted for trach


Plan: 


Will plan for trach tomorrow.


Still determining Bedside vs OR at this time.


Will determine and addend note as needed





D/W Dr Stoney Jiang, DO, PGY1





- Date & Time


Date: 12/13/15


Time: 11:11

## 2015-12-13 NOTE — CP.PCM.CON
History of Present Illness





- History of Present Illness


History of Present Illness: 


admitted after cardiac arres


PNEUMONIA SECONDARY TO ASPI=RATION SUSPECTED


RX IN PROGRESS





Review of Systems





- Review of Systems


Systems not reviewed;Unavailable: Intubated





- Constitutional


Constitutional: Fever





- EENT


Eyes: As Per HPI


Ears: As Per HPI


Nose/Mouth/Throat: As Per HPI





- Cardiovascular


Cardiovascular: As Per HPI





- Respiratory


Respiratory: As Per HPI





- Gastrointestinal


Gastrointestinal: As Per HPI





- Genitourinary


Genitourinary: As Per HPI





- Reproductive: Male


Reproductive:Male: As Per HPI





- Musculoskeletal


Musculoskeletal: As Par HPI





- Integumentary


Integumentary: As Per HPI





- Neurological


Neurological: As Per HPI





- Psychiatric


Psychiatric: As Per HPI





- Endocrine


Endocrine: As Per HPI





- Hematologic/Lymphatic


Hematologic: As Per HPI





Past Patient History





- Past Medical History & Family History


Past Medical History?: Yes





- Past Social History


Smoking Status: Heavy Smoker > 10 Cigarettes Daily





- CARDIAC


Hx Cardiac Disorders:  (unknown)





- NEUROLOGICAL


Hx Neurological Disorder: Yes (seizure)


Hx Seizures: Yes





- HEENT


Hx HEENT Problems: No





- RENAL


Hx Chronic Kidney Disease: No





- ENDOCRINE/METABOLIC


Hx Endocrine Disorders: No





- HEMATOLOGICAL/ONCOLOGICAL


Hx Blood Disorders: No





- INTEGUMENTARY


Hx Dermatological Problems: No





- MUSCULOSKELETAL/RHEUMATOLOGICAL


Hx Falls: No





- GENITOURINARY/GYNECOLOGICAL


Hx Genitourinary Disorders: No





- PSYCHIATRIC


Hx Substance Use: No





Meds


Allergies/Adverse Reactions: 


 Allergies











Allergy/AdvReac Type Severity Reaction Status Date / Time


 


Unobtainable Allergy   Verified 12/08/15 09:37














- Medications


Medications: 


 Current Medications





Acetaminophen (Tylenol 650mg/20.3ml Solution Ud)  650 mg PO Q6 PRN


   PRN Reason: Fever


   Last Admin: 12/13/15 14:03 Dose:  650 mg


Aspirin (Ecotrin)  81 mg PO DAILY [facility]


Enalapril Maleate (Vasotec)  10 mg PO DAILY [facility]


   Last Admin: 12/13/15 09:02 Dose:  10 mg


Famotidine (Pepcid)  20 mg GT BID [facility]


   Last Admin: 12/13/15 09:00 Dose:  20 mg


Vancomycin HCl 1 gm/ Sodium (Chloride)  250 mls @ 125 mls/hr IVPB Q12 [facility]


   Last Admin: 12/13/15 11:31 Dose:  125 mls/hr


Ceftriaxone Sodium 2 gm/ (Sodium Chloride)  100 mls @ 100 mls/hr IVPB DAILY [facility]


Propofol (Diprivan)  100 mls @ 2.041 mls/hr IV .Q24H [facility]; 5 MCG/KG/MIN


   PRN Reason: Protocol


   Stop: 12/14/15 13:28


Insulin Human Regular (Humulin R)  0 units SC ACHS [facility]


   PRN Reason: Protocol


   Last Admin: 12/13/15 06:43 Dose:  Not Given


Lorazepam (Ativan)  2 mg IVP Q6 PRN


   PRN Reason: Agitation


   Last Admin: 12/13/15 13:55 Dose:  2 mg


Morphine Sulfate (Morphine)  4 mg IVP Q2H PRN


   PRN Reason: Pain, moderate (4-7)


   Last Admin: 12/13/15 08:26 Dose:  4 mg


Multivitamins/Vitamin C (Multi-Delyn Liquid)  5 ml PO DAILY [facility]


   Last Admin: 12/13/15 08:55 Dose:  5 ml


Nitroglycerin (Nitro-Bid 2% Oint)  2 in TOP Q6 [facility]


   Last Admin: 12/13/15 08:58 Dose:  2 in











Physical Exam





- Constitutional


Appears: Confused, Chronically Ill





- Head Exam


Head Exam: ATRAUMATIC, NORMAL INSPECTION, NORMOCEPHALIC





- Eye Exam


Eye Exam: absent: Conjunctival injection, Scleral icterus


Pupil Exam: Miosis





- ENT Exam


ENT Exam: Mucous Membranes Dry, Normal External Ear Exam





- Neck Exam


Neck exam: Negative for: Lymphadenopathy, Thyromegaly





- Respiratory Exam


Respiratory Exam: Decreased Breath Sounds, Rhonchi





- Cardiovascular Exam


Cardiovascular Exam: REGULAR RHYTHM, +S1, +S2





- GI/Abdominal Exam


GI & Abdominal Exam: Diminished Bowel Sounds, Distended, Soft.  absent: Firm, 

Guarding, Rebound, Tenderness





- Rectal Exam


Rectal Exam: Deferred





-  Exam


 Exam: NORMAL INSPECTION





- Extremities Exam


Extremities exam: Positive for: pedal pulses present.  Negative for: pedal edema

, tenderness





- Back Exam


Back exam: absent: CVA tenderness (L), CVA tenderness (R), paraspinal tenderness





- Neurological Exam


Neurological exam: Altered





- Psychiatric Exam


Psychiatric exam: Depressed





- Skin


Skin Exam: Dry





Results





- Vital Signs


Recent Vital Signs: 


 Last Vital Signs











Temp  100.6 F H  12/13/15 08:00


 


Pulse  92 H  12/13/15 10:00


 


Resp  16   12/13/15 10:00


 


BP  118/67   12/13/15 10:00


 


Pulse Ox  100   12/13/15 10:00














- Labs


Result Diagrams: 


 12/14/15 05:20





 12/14/15 05:20


Labs: 


 Laboratory Results - last 24 hr











  12/12/15 12/12/15 12/13/15





  17:48 21:55 04:30


 


WBC    11.4 H


 


RBC    3.37 L


 


Hgb    10.9 L


 


Hct    35.6


 


MCV    105.9 H


 


MCH    32.5 H


 


MCHC    30.7 L


 


RDW    14.0


 


Plt Count    219


 


MPV    7.6


 


Neut % (Auto)    72.7


 


Lymph % (Auto)    11.4 L


 


Mono % (Auto)    14.7 H


 


Eos % (Auto)    0.8


 


Baso % (Auto)    0.4


 


Neut #    8.3 H


 


Lymph #    1.3


 


Mono #    1.7 H


 


Eos #    0.1


 


Baso #    0.0


 


pCO2   


 


pO2   


 


HCO3   


 


ABG pH   


 


ABG Total CO2   


 


ABG O2 Saturation   


 


ABG O2 Content   


 


ABG Base Excess   


 


ABG Hemoglobin   


 


ABG Carboxyhemoglobin   


 


POC ABG HHb (Measured)   


 


ABG Methemoglobin   


 


ABG O2 Capacity   


 


Chu Test   


 


A-a O2 Difference   


 


Hgb O2 Saturation   


 


FiO2   


 


Crit Value Called To   


 


Crit Value Read Back   


 


Blood Gas Notified Time   


 


Sodium    148


 


Potassium    3.7


 


Chloride    108 H


 


Carbon Dioxide    29


 


Anion Gap    15


 


BUN    14


 


Creatinine    1.1


 


Est GFR ( Amer)    > 60


 


Est GFR (Non-Af Amer)    > 60


 


POC Glucose (mg/dL)  104  104 


 


Random Glucose    99


 


Calcium    9.1














  12/13/15 12/13/15 12/13/15





  04:40 05:42 12:10


 


WBC   


 


RBC   


 


Hgb   


 


Hct   


 


MCV   


 


MCH   


 


MCHC   


 


RDW   


 


Plt Count   


 


MPV   


 


Neut % (Auto)   


 


Lymph % (Auto)   


 


Mono % (Auto)   


 


Eos % (Auto)   


 


Baso % (Auto)   


 


Neut #   


 


Lymph #   


 


Mono #   


 


Eos #   


 


Baso #   


 


pCO2  45  


 


pO2  147 H  


 


HCO3  30.6 H  


 


ABG pH  7.46 H  


 


ABG Total CO2  33.4 H  


 


ABG O2 Saturation  98.5 H  


 


ABG O2 Content  14.1 L  


 


ABG Base Excess  7.3 H  


 


ABG Hemoglobin  10.2 L  


 


ABG Carboxyhemoglobin  1.2  


 


POC ABG HHb (Measured)  1.5  


 


ABG Methemoglobin  1.0  


 


ABG O2 Capacity  14.3 L  


 


Chu Test  Yes  


 


A-a O2 Difference  225.0  


 


Hgb O2 Saturation  96.3  


 


FiO2  60.0  


 


Crit Value Called To  Aislinn bass  


 


Crit Value Read Back  Yes  


 


Blood Gas Notified Time  451  


 


Sodium   


 


Potassium   


 


Chloride   


 


Carbon Dioxide   


 


Anion Gap   


 


BUN   


 


Creatinine   


 


Est GFR ( Amer)   


 


Est GFR (Non-Af Amer)   


 


POC Glucose (mg/dL)   92  104


 


Random Glucose   


 


Calcium   














Assessment/Plan


(1) Agitation


Current Visit: Yes   Status: Acute


Comment: cont propofol for agitation


unsure if this is lower brainstem activity vs seizure activity vs other etiology








(2) Aspiration pneumonia


Current Visit: Yes   Status: Acute


Comment: worsened cxr, cont anbx, ID condult, trach placement








(3) Cardiac arrest


Current Visit: Yes   Status: Acute


Comment: s/p acls proceudres, ST w/ periods of SB


echo


cardio consult


pt remains on propofol for agitation


hr remains elevated unsure if this is related to fever, agitation or other 

etiology


cath when stable as per cardio


s/p cooling protocol


no cardiac intervention planned at present








(4) Diabetes mellitus with hyperglycemia


Current Visit: Yes   Status: Acute


Comment: riss per protocol








(5) History of seizure


Current Visit: Yes   Status: Acute


Comment: neuro consult








(6) NSTEMI (non-ST elevated myocardial infarction)


Current Visit: Yes   Status: Acute

## 2015-12-13 NOTE — CP.CCUPN
CCU Subjective





- Physician Review


Subjective (Free Text): 





12/13/15 21:00





Patient noted to have pulled ET tube out during a bout of agitation. ET tube at 

16 cm. Patient oxygenation dropping to 80s. 





Patient was re-intubated. Patient was agitated prior to procedure, and received 

50 mg propofol bolus and 20 mg of rocuronium IV. Patient's airway was 

visualized with direct laryngyscope using Mac 4 blade. Patient was intubated 

with 7.5 Tajik ET tube. Intubation was successful, ET CO2 monitor showed color 

change, b/l bs were auscultated, and O2 sat returned to high 90s. 





Pending CXR.





Critical Care Time Spent (in minutes): 30





CCU Objective





- Vital Signs / Intake & Output


Vital Signs (Last 4 hours): 


Vital Signs











  Pulse Resp BP Pulse Ox


 


 12/13/15 18:00  107 H  15  125/81  100











Intake and Output (Last 8hrs): 


 Intake & Output











 12/13/15 12/13/15 12/13/15





 06:59 14:59 22:59


 


Intake Total 1695  1977


 


Output Total 1500  600


 


Balance 195  1377


 


Intake:   


 


    1027


 


  Intake, Piggyback 225  350


 


  Oral 495  


 


  Tube Feeding 100  600


 


  Free Water Flush 200  


 


Output:   


 


  Urine 1500  600


 


    Urethral (Lua) 1500  600


 


Other:   


 


  # Bowel Movements 1  2














- Medications


Active Medications: 


Active Medications











Generic Name Dose Route Start Last Admin





  Trade Name Freq  PRN Reason Stop Dose Admin


 


Acetaminophen  650 mg 12/13/15 13:21 12/13/15 20:07





  Tylenol 650mg/20.3ml Solution Ud  PO   650 mg





  Q6 PRN   Administration





  Fever   


 


Aspirin  81 mg 12/13/15 13:30 12/13/15 17:20





  Ecotrin  PO   81 mg





  DAILY [facility]   Administration


 


Enalapril Maleate  10 mg 12/10/15 11:15 12/13/15 09:02





  Vasotec  PO   10 mg





  DAILY [facility]   Administration


 


Famotidine  20 mg 12/10/15 17:00 12/13/15 17:22





  Pepcid  GT   20 mg





  BID [facility]   Administration


 


Vancomycin HCl 1 gm/ Sodium  250 mls @ 125 mls/hr 12/08/15 18:45 12/13/15 20:21





  Chloride  IVPB   125 mls/hr





  Q12 [facility]   Administration


 


Ceftriaxone Sodium 2 gm/  100 mls @ 100 mls/hr 12/13/15 13:30 12/13/15 17:23





  Sodium Chloride  IVPB   100 mls/hr





  DAILY [facility]   Administration


 


Propofol  100 mls @ 2.041 mls/hr 12/13/15 13:30  





  Diprivan  IV 12/14/15 13:28  





  .Q24H [facility]   





  Protocol   





  5 MCG/KG/MIN   


 


Insulin Human Regular  0 units 12/10/15 22:00 12/13/15 17:20





  Humulin R  SC   Not Given





  [facility]   





  Protocol   


 


Lorazepam  2 mg 12/11/15 06:58 12/13/15 20:07





  Ativan  IVP   2 mg





  Q6 PRN   Administration





  Agitation   


 


Morphine Sulfate  4 mg 12/09/15 13:10 12/13/15 15:36





  Morphine  IVP   4 mg





  Q2H PRN   Administration





  Pain, moderate (4-7)   


 


Multivitamins/Vitamin C  5 ml 12/10/15 09:00 12/13/15 08:55





  Multi-Delyn Liquid  PO   5 ml





  DAILY [facility]   Administration


 


Nitroglycerin  2 in 12/10/15 16:00 12/13/15 17:22





  Nitro-Bid 2% Oint  TOP   2 in





  Q6 [facility]   Administration














- Patient Studies


Lab Studies: 


 Microbiology Studies











 12/08/15 15:10 Blood Culture - Final





 Blood    NO GROWTH AFTER 5 DAYS





 Gram Stain - Final





    TEST NOT PERFORMED


 


 12/10/15 13:50 Blood Culture - Preliminary





 Blood-Venous    NO GROWTH AFTER 3 DAYS


 


 12/10/15 13:50 Blood Culture - Preliminary





 Blood-Venous    NO GROWTH AFTER 3 DAYS


 


 12/08/15 12:00 Blood Culture - Final





 Blood-Venous    NO GROWTH AFTER 5 DAYS





 Gram Stain - Final





    TEST NOT PERFORMED


 


 12/08/15 12:00 Blood Culture - Final





 Blood-Venous    NO GROWTH AFTER 5 DAYS





 Gram Stain - Final





    TEST NOT PERFORMED


 


 12/11/15 05:21 Gram Stain - Final





 Trachasp Bronchial Culture - Final





    Klebsiella Pneumoniae Ssp Pneu








 Lab Studies











  12/13/15 12/13/15 12/13/15 Range/Units





  16:09 12:10 05:42 


 


WBC     (4.8-10.8)  K/uL


 


RBC     (4.40-5.90)  Mil/uL


 


Hgb     (12.0-18.0)  g/dL


 


Hct     (35.0-51.0)  %


 


MCV     (80.0-94.0)  fl


 


MCH     (27.0-31.0)  pg


 


MCHC     (33.0-37.0)  g/dL


 


RDW     (11.5-14.5)  %


 


Plt Count     (130-400)  K/uL


 


MPV     (7.2-11.7)  fl


 


Neut % (Auto)     (50.0-75.0)  %


 


Lymph % (Auto)     (20.0-40.0)  %


 


Mono % (Auto)     (0.0-10.0)  %


 


Eos % (Auto)     (0.0-4.0)  %


 


Baso % (Auto)     (0.0-2.0)  %


 


Neut #     (1.8-7.0)  K/uL


 


Lymph #     (1.0-4.3)  K/uL


 


Mono #     (0.0-0.8)  K/uL


 


Eos #     (0.0-0.7)  K/uL


 


Baso #     (0.0-0.2)  K/uL


 


pCO2     (35-45)  mm/Hg


 


pO2     ()  mm/Hg


 


HCO3     (21-28)  mmol/L


 


ABG pH     (7.35-7.45)  


 


ABG Total CO2     (22-28)  mmol/L


 


ABG O2 Saturation     (95-98)  %


 


ABG O2 Content     (15-23)  ML/dL


 


ABG Base Excess     (-2.0-3.0)  mmol/L


 


ABG Hemoglobin     (11.7-17.4)  g/dL


 


ABG Carboxyhemoglobin     (0.5-1.5)  %


 


POC ABG HHb (Measured)     (0.0-5.0)  %


 


ABG Methemoglobin     (0.0-3.0)  %


 


ABG O2 Capacity     (16-24)  mL/dL


 


Chu Test     


 


A-a O2 Difference     mm/Hg


 


Hgb O2 Saturation     (95.0-98.0)  %


 


FiO2     %


 


Crit Value Called To     


 


Crit Value Read Back     


 


Blood Gas Notified Time     


 


Sodium     (132-148)  mmol/l


 


Potassium     (3.6-5.0)  MMOL/L


 


Chloride     ()  mmol/L


 


Carbon Dioxide     (22-30)  mmol/L


 


Anion Gap     (10-20)  


 


BUN     (9-20)  mg/dl


 


Creatinine     (0.8-1.5)  mg/dL


 


Est GFR (African Amer)     


 


Est GFR (Non-Af Amer)     


 


POC Glucose (mg/dL)  108  104  92  ()  mg/dL


 


Random Glucose     ()  mg/dL


 


Calcium     (8.4-10.2)  mg/dL














  12/13/15 12/13/15 12/12/15 Range/Units





  04:40 04:30 21:55 


 


WBC   11.4 H   (4.8-10.8)  K/uL


 


RBC   3.37 L   (4.40-5.90)  Mil/uL


 


Hgb   10.9 L   (12.0-18.0)  g/dL


 


Hct   35.6   (35.0-51.0)  %


 


MCV   105.9 H   (80.0-94.0)  fl


 


MCH   32.5 H   (27.0-31.0)  pg


 


MCHC   30.7 L   (33.0-37.0)  g/dL


 


RDW   14.0   (11.5-14.5)  %


 


Plt Count   219   (130-400)  K/uL


 


MPV   7.6   (7.2-11.7)  fl


 


Neut % (Auto)   72.7   (50.0-75.0)  %


 


Lymph % (Auto)   11.4 L   (20.0-40.0)  %


 


Mono % (Auto)   14.7 H   (0.0-10.0)  %


 


Eos % (Auto)   0.8   (0.0-4.0)  %


 


Baso % (Auto)   0.4   (0.0-2.0)  %


 


Neut #   8.3 H   (1.8-7.0)  K/uL


 


Lymph #   1.3   (1.0-4.3)  K/uL


 


Mono #   1.7 H   (0.0-0.8)  K/uL


 


Eos #   0.1   (0.0-0.7)  K/uL


 


Baso #   0.0   (0.0-0.2)  K/uL


 


pCO2  45    (35-45)  mm/Hg


 


pO2  147 H    ()  mm/Hg


 


HCO3  30.6 H    (21-28)  mmol/L


 


ABG pH  7.46 H    (7.35-7.45)  


 


ABG Total CO2  33.4 H    (22-28)  mmol/L


 


ABG O2 Saturation  98.5 H    (95-98)  %


 


ABG O2 Content  14.1 L    (15-23)  ML/dL


 


ABG Base Excess  7.3 H    (-2.0-3.0)  mmol/L


 


ABG Hemoglobin  10.2 L    (11.7-17.4)  g/dL


 


ABG Carboxyhemoglobin  1.2    (0.5-1.5)  %


 


POC ABG HHb (Measured)  1.5    (0.0-5.0)  %


 


ABG Methemoglobin  1.0    (0.0-3.0)  %


 


ABG O2 Capacity  14.3 L    (16-24)  mL/dL


 


Chu Test  Yes    


 


A-a O2 Difference  225.0    mm/Hg


 


Hgb O2 Saturation  96.3    (95.0-98.0)  %


 


FiO2  60.0    %


 


Crit Value Called To  Aislinn bass    


 


Crit Value Read Back  Yes    


 


Blood Gas Notified Time  451    


 


Sodium   148   (132-148)  mmol/l


 


Potassium   3.7   (3.6-5.0)  MMOL/L


 


Chloride   108 H   ()  mmol/L


 


Carbon Dioxide   29   (22-30)  mmol/L


 


Anion Gap   15   (10-20)  


 


BUN   14   (9-20)  mg/dl


 


Creatinine   1.1   (0.8-1.5)  mg/dL


 


Est GFR (African Amer)   > 60   


 


Est GFR (Non-Af Amer)   > 60   


 


POC Glucose (mg/dL)    104  ()  mg/dL


 


Random Glucose   99   ()  mg/dL


 


Calcium   9.1   (8.4-10.2)  mg/dL








 Laboratory Results - last 24 hr











  12/12/15 12/13/15 12/13/15





  21:55 04:30 04:40


 


WBC   11.4 H 


 


RBC   3.37 L 


 


Hgb   10.9 L 


 


Hct   35.6 


 


MCV   105.9 H 


 


MCH   32.5 H 


 


MCHC   30.7 L 


 


RDW   14.0 


 


Plt Count   219 


 


MPV   7.6 


 


Neut % (Auto)   72.7 


 


Lymph % (Auto)   11.4 L 


 


Mono % (Auto)   14.7 H 


 


Eos % (Auto)   0.8 


 


Baso % (Auto)   0.4 


 


Neut #   8.3 H 


 


Lymph #   1.3 


 


Mono #   1.7 H 


 


Eos #   0.1 


 


Baso #   0.0 


 


pCO2    45


 


pO2    147 H


 


HCO3    30.6 H


 


ABG pH    7.46 H


 


ABG Total CO2    33.4 H


 


ABG O2 Saturation    98.5 H


 


ABG O2 Content    14.1 L


 


ABG Base Excess    7.3 H


 


ABG Hemoglobin    10.2 L


 


ABG Carboxyhemoglobin    1.2


 


POC ABG HHb (Measured)    1.5


 


ABG Methemoglobin    1.0


 


ABG O2 Capacity    14.3 L


 


Chu Test    Yes


 


A-a O2 Difference    225.0


 


Hgb O2 Saturation    96.3


 


FiO2    60.0


 


Crit Value Called To    Aislinn bass


 


Crit Value Read Back    Yes


 


Blood Gas Notified Time    451


 


Sodium   148 


 


Potassium   3.7 


 


Chloride   108 H 


 


Carbon Dioxide   29 


 


Anion Gap   15 


 


BUN   14 


 


Creatinine   1.1 


 


Est GFR ( Amer)   > 60 


 


Est GFR (Non-Af Amer)   > 60 


 


POC Glucose (mg/dL)  104  


 


Random Glucose   99 


 


Calcium   9.1 














  12/13/15 12/13/15 12/13/15





  05:42 12:10 16:09


 


WBC   


 


RBC   


 


Hgb   


 


Hct   


 


MCV   


 


MCH   


 


MCHC   


 


RDW   


 


Plt Count   


 


MPV   


 


Neut % (Auto)   


 


Lymph % (Auto)   


 


Mono % (Auto)   


 


Eos % (Auto)   


 


Baso % (Auto)   


 


Neut #   


 


Lymph #   


 


Mono #   


 


Eos #   


 


Baso #   


 


pCO2   


 


pO2   


 


HCO3   


 


ABG pH   


 


ABG Total CO2   


 


ABG O2 Saturation   


 


ABG O2 Content   


 


ABG Base Excess   


 


ABG Hemoglobin   


 


ABG Carboxyhemoglobin   


 


POC ABG HHb (Measured)   


 


ABG Methemoglobin   


 


ABG O2 Capacity   


 


Chu Test   


 


A-a O2 Difference   


 


Hgb O2 Saturation   


 


FiO2   


 


Crit Value Called To   


 


Crit Value Read Back   


 


Blood Gas Notified Time   


 


Sodium   


 


Potassium   


 


Chloride   


 


Carbon Dioxide   


 


Anion Gap   


 


BUN   


 


Creatinine   


 


Est GFR ( Amer)   


 


Est GFR (Non-Af Amer)   


 


POC Glucose (mg/dL)  92  104  108


 


Random Glucose   


 


Calcium   











Fingerstick Blood Sugar Results: 108

## 2015-12-13 NOTE — CP.PCM.PN
Subjective





- Subjective


Subjective: 


pt remains clinically unchanged. all b/w, vital signs and imagin noted.  pt 

remains febrile and sedated on vent.  hr sinus rhythm and low 100s. no response 

to tactile, verbal stimuli


worsening cxr w/ likely aspiration pna noted. 





Review of Systems





- Review of Systems


Systems not reviewed;Unavailable: Intubated





- Constitutional


Constitutional: UN





- EENT


Eyes: UNREMARKABLE


Ears: UNREMARKABLE


Nose/Mouth/Throat: UNREMARKABLE





- Cardiovascular


Cardiovascular: UNREMARKABLE





- Respiratory


Respiratory: As Per HPI, Excessive Mucous Production





- Gastrointestinal


Gastrointestinal: UNREMARKABLE





- Genitourinary


Genitourinary: UNREMARKABLE





- Reproductive: Male


Reproductive:Male: UNREMARKABLE





- Musculoskeletal


Musculoskeletal: UNREMARKABLE





- Integumentary


Integumentary: UNREMARKABLE





- Neurological


Neurological: UNREMARKABLE





- Psychiatric


Psychiatric: UNREMARKABLE





- Endocrine


Endocrine: UNREMARKABLE





- Hematologic/Lymphatic


Hematologic: UNREMARKABLE





Objective





- Vital Signs/Intake and Output


Vital Signs (last 24 hours): 


 Vital Signs - 24 hr











  12/12/15 12/12/15 12/12/15





  16:00 17:58 20:00


 


Temperature 100.3 F H 100.1 F H 100.7 F H


 


Pulse Rate 98 H 116 H 97 H


 


Respiratory 16 19 21





Rate   


 


Blood Pressure 151/90 H 129/81 141/71


 


O2 Sat by Pulse 100 100 97





Oximetry   














  12/12/15 12/12/15 12/12/15





  21:23 21:57 22:14


 


Temperature   100.6 F H


 


Pulse Rate 110 H 118 H 119 H


 


Respiratory 21 18 18





Rate   


 


Blood Pressure 155/78 H 155/78 H 163/100 H


 


O2 Sat by Pulse 100 99 99





Oximetry   














  12/12/15 12/12/15 12/13/15





  22:24 23:00 00:00


 


Temperature   100.4 F H


 


Pulse Rate 127 H 126 H 127 H


 


Respiratory 21 29 H 20





Rate   


 


Blood Pressure 166/95 H 154/84 H 170/111 H


 


O2 Sat by Pulse 100 97 99





Oximetry   














  12/13/15 12/13/15 12/13/15





  01:00 02:00 03:00


 


Temperature   


 


Pulse Rate 112 H 104 H 90


 


Respiratory 21 11 L 17





Rate   


 


Blood Pressure 176/114 H 149/91 H 131/77


 


O2 Sat by Pulse 98 100 100





Oximetry   














  12/13/15 12/13/15 12/13/15





  04:00 05:00 06:00


 


Temperature 99.6 F  


 


Pulse Rate 84 98 H 103 H


 


Respiratory 17 18 18





Rate   


 


Blood Pressure 121/67 158/79 H 144/90


 


O2 Sat by Pulse 99 100 100





Oximetry   














  12/13/15 12/13/15 12/13/15





  06:42 08:00 10:00


 


Temperature  100.6 F H 


 


Pulse Rate 107 H 112 H 92 H


 


Respiratory 21 21 16





Rate   


 


Blood Pressure 135/88 144/90 118/67


 


O2 Sat by Pulse 100 98 100





Oximetry   











Intake and Output (last 12 hours): 


 Intake & Output











 12/12/15 12/13/15 12/13/15





 18:59 06:59 18:59


 


Intake Total 2675 2120 


 


Output Total 3400 1500 


 


Balance -725 620 


 


Intake:   


 


  IV 1275 750 


 


  Intake, Piggyback 450 575 


 


  Oral  495 


 


  Tube Feeding 550 100 


 


  Free Water Flush 400 200 


 


Output:   


 


  Urine 3400 1500 


 


    Urethral (Mcgee) 3400 1500 


 


Other:   


 


  # Bowel Movements 1 1 














- Medications


Medications: 


 Current Medications





Acetaminophen (Tylenol 650mg/20.3ml Solution Ud)  650 mg PO Q6 PRN


   PRN Reason: Fever


   Last Admin: 12/13/15 14:03 Dose:  650 mg


Aspirin (Ecotrin)  81 mg PO DAILY [facility]


Enalapril Maleate (Vasotec)  10 mg PO DAILY [facility]


   Last Admin: 12/13/15 09:02 Dose:  10 mg


Famotidine (Pepcid)  20 mg GT BID [facility]


   Last Admin: 12/13/15 09:00 Dose:  20 mg


Vancomycin HCl 1 gm/ Sodium (Chloride)  250 mls @ 125 mls/hr IVPB Q12 [facility]


   Last Admin: 12/13/15 11:31 Dose:  125 mls/hr


Ceftriaxone Sodium 2 gm/ (Sodium Chloride)  100 mls @ 100 mls/hr IVPB DAILY [facility]


Propofol (Diprivan)  100 mls @ 2.041 mls/hr IV .Q24H MAGDALENA; 5 MCG/KG/MIN


   PRN Reason: Protocol


   Stop: 12/14/15 13:28


Insulin Human Regular (Humulin R)  0 units SC ACHS MAGDALENA


   PRN Reason: Protocol


   Last Admin: 12/13/15 06:43 Dose:  Not Given


Lorazepam (Ativan)  2 mg IVP Q6 PRN


   PRN Reason: Agitation


   Last Admin: 12/13/15 13:55 Dose:  2 mg


Morphine Sulfate (Morphine)  4 mg IVP Q2H PRN


   PRN Reason: Pain, moderate (4-7)


   Last Admin: 12/13/15 08:26 Dose:  4 mg


Multivitamins/Vitamin C (Multi-Delyn Liquid)  5 ml PO DAILY [facility]


   Last Admin: 12/13/15 08:55 Dose:  5 ml


Nitroglycerin (Nitro-Bid 2% Oint)  2 in TOP Q6 [facility]


   Last Admin: 12/13/15 08:58 Dose:  2 in











- Labs


Labs (last 24 hours): 


 Laboratory Results - last 24 hr











  12/12/15 12/12/15 12/13/15





  17:48 21:55 04:30


 


WBC    11.4 H


 


RBC    3.37 L


 


Hgb    10.9 L


 


Hct    35.6


 


MCV    105.9 H


 


MCH    32.5 H


 


MCHC    30.7 L


 


RDW    14.0


 


Plt Count    219


 


MPV    7.6


 


Neut % (Auto)    72.7


 


Lymph % (Auto)    11.4 L


 


Mono % (Auto)    14.7 H


 


Eos % (Auto)    0.8


 


Baso % (Auto)    0.4


 


Neut #    8.3 H


 


Lymph #    1.3


 


Mono #    1.7 H


 


Eos #    0.1


 


Baso #    0.0


 


pCO2   


 


pO2   


 


HCO3   


 


ABG pH   


 


ABG Total CO2   


 


ABG O2 Saturation   


 


ABG O2 Content   


 


ABG Base Excess   


 


ABG Hemoglobin   


 


ABG Carboxyhemoglobin   


 


POC ABG HHb (Measured)   


 


ABG Methemoglobin   


 


ABG O2 Capacity   


 


Chu Test   


 


A-a O2 Difference   


 


Hgb O2 Saturation   


 


FiO2   


 


Crit Value Called To   


 


Crit Value Read Back   


 


Blood Gas Notified Time   


 


Sodium    148


 


Potassium    3.7


 


Chloride    108 H


 


Carbon Dioxide    29


 


Anion Gap    15


 


BUN    14


 


Creatinine    1.1


 


Est GFR ( Amer)    > 60


 


Est GFR (Non-Af Amer)    > 60


 


POC Glucose (mg/dL)  104  104 


 


Random Glucose    99


 


Calcium    9.1














  12/13/15 12/13/15 12/13/15





  04:40 05:42 12:10


 


WBC   


 


RBC   


 


Hgb   


 


Hct   


 


MCV   


 


MCH   


 


MCHC   


 


RDW   


 


Plt Count   


 


MPV   


 


Neut % (Auto)   


 


Lymph % (Auto)   


 


Mono % (Auto)   


 


Eos % (Auto)   


 


Baso % (Auto)   


 


Neut #   


 


Lymph #   


 


Mono #   


 


Eos #   


 


Baso #   


 


pCO2  45  


 


pO2  147 H  


 


HCO3  30.6 H  


 


ABG pH  7.46 H  


 


ABG Total CO2  33.4 H  


 


ABG O2 Saturation  98.5 H  


 


ABG O2 Content  14.1 L  


 


ABG Base Excess  7.3 H  


 


ABG Hemoglobin  10.2 L  


 


ABG Carboxyhemoglobin  1.2  


 


POC ABG HHb (Measured)  1.5  


 


ABG Methemoglobin  1.0  


 


ABG O2 Capacity  14.3 L  


 


Chu Test  Yes  


 


A-a O2 Difference  225.0  


 


Hgb O2 Saturation  96.3  


 


FiO2  60.0  


 


Crit Value Called To  Aislinn bass  


 


Crit Value Read Back  Yes  


 


Blood Gas Notified Time  451  


 


Sodium   


 


Potassium   


 


Chloride   


 


Carbon Dioxide   


 


Anion Gap   


 


BUN   


 


Creatinine   


 


Est GFR ( Amer)   


 


Est GFR (Non-Af Amer)   


 


POC Glucose (mg/dL)   92  104


 


Random Glucose   


 


Calcium   














- Constitutional


Appears: Non-toxic, No Acute Distress, Chronically Ill





- Head Exam


Head Exam: ATRAUMATIC, NORMAL INSPECTION, NORMOCEPHALIC





- Eye Exam


Eye Exam: EOMI





- Respiratory Exam


Respiratory Exam: Clear to PA & Lateral


Additional comments: 


vented





- Cardiovascular Exam


Cardiovascular Exam: Tachycardia, REGULAR RHYTHM, RRR





- GI/Abdominal Exam


GI & Abdominal Exam: Normal Bowel Sounds, Soft, Unremarkable





- Skin


Skin Exam: Dry, Intact, Normal Color, Warm





Assessment/Plan


(1) Cardiac arrest


Current Visit: Yes   Status: Acute


Comment: s/p acls proceudres, ST w/ periods of SB


cont amiodarone


echo


cardio consult


sedation gtt held nad prn sedation ordered by icu attending. 


hr remains elevated unsure if this is related to fever, agitation or other 

etiology


cath when stable


cont cooling protocol


cont sedation, ivf


no cardiac intervention planned at present








(2) DVT prophylaxis


Current Visit: Yes   Status: Acute


Comment: lovenox








(3) Scrotal edema


Current Visit: Yes   Status: Acute


Comment: scrotal us-noted


urology consult-pending








(4) History of seizure


Current Visit: Yes   Status: Acute


Comment: neuro consult








(5) Diabetes mellitus with hyperglycemia


Current Visit: Yes   Status: Acute


Comment: riss per protocol








(6) Agitation


Current Visit: Yes   Status: Acute


Comment: cont propofol for agitation


unsure if this is lower brainstem activity vs seizure activity vs other etiology








(7) UTI (urinary tract infection)


Current Visit: Yes   Status: Acute


Comment: cont anbx


mcgee cath


uro consult pending








(8) Aspiration pneumonia


Current Visit: Yes   Status: Acute


Comment: worsened cxr, cont anbx, ID condult, trach placement











- Assessment and Plan (Free Text)


Assessment: 


will plan for ID consult, trach, peg tube and picc placement

## 2015-12-13 NOTE — RAD
Chest dated 12/13/2015. 



History: Intubated. 



Single AP supine portable view of the chest performed and compared 

with prior study dated 12/12/2015. 



Findings: In situ ETT, tip of which lies approximately 5.5 cm above 

carinal. In situ NGT is also present, the tip of which extends just 

distal to the level of the EG junction and the should be advanced. 



Diffuse bilateral infiltrates essentially unchanged allowing for 

differences in patient positioning and technique. 



Impression: NGT tip appears to lie at just distal to the EG junction 

and should be advanced. 



Diffuse bilateral infiltrates.

## 2015-12-13 NOTE — CP.CCUPN
CCU Subjective





- Physician Review


Subjective (Free Text): 


   





***INTENSIVIST PROGRESS NOTE***


Patient examined, interim events reviewed:





Requiring 3 anxiolytics with Propofol resumed yesterday (no bradycardic 

episodes noted since re-initiation of Propofol)  for control of agitation on MV 

support. Have been unable to fully assess neuromental status before agitation 

causes patient to self extubate. Occasional chewing movements observed, no 

focal seizure or other myoclonic activity noted. He remains hemodynamically 

stable otherwise without need for vasoactive meds. Frequent BETTINA noted over the 

past several days manifest by occasional PVCs and couplets. No sustained runs 

of ventricular ectopy noted. No interaction noted to verbal or tactile stimuli, 

no abnormal posturing noted as well. Given Lasix yesterday with slightly 

negative fluid balance over last 24H.   Febrile to 101.6F yesterday, 100.6F now

, 112, 144/90, 21 98% on 60% FiO2. Breathing 21 on AC 16, 600ml TV, Peep %, 60% 

oxygen.  





No other distress noted: 


EXAM-


HEENT: no icterus, no nystagmus, pupils 2-3 mm, no gaze preference,  no 

nystagmus


NECK: no visible JVD, supple, carotids equal upstroke bilat/no bruits


CHEST: decreased BS bases, no wheezes audible


HEART:  regular, distant, S1S2, no murmur audible, no rubs.


ABD:  soft, no increased distention, no focal tenderness,  no HSM. BS hypoactive

, 


EXT:    no edema, no peripheral/ digital cyanosis, no palpable cords, distal 

pulses intact and symmetrical


NEURO:  plantars no response. Otherwise flaccid legs. 


SKIN:   no rashes





LABS


WBC= 11.4


HGB= 10.9


PLTs = 219K


Na= 148


K=  3.7


HCO3= 29


BUN/Cr=  14/1.1


BS= 99


7.46/45/147


CXR: ETT position high above ru, more prominent multi-lobular interstitial 

changes: entire left lung, R hilar / RML area and RUL (my interp).





Assessment: 


1.   Cardiac Arrest with resuscitation from VT. 


2.   Post-Resuscitation Therapeutic Hypothermia-completed


3.   Anoxic Encephalopathy


4.     Aspiration Pneumonia








PLAN:


1.  Day # 6 on MV support, expect long term need for MV support, given 

progressive and worsening aspiration pneumonia, and anoxic encephalopathy. 

Consider early trach and PEG. 


2.  Ensure coverage for Klebsiella, on Vanco/Zosyn, will switch Zosyn to 

Rocephin.


3.  Consider ID eval.


4.  will need PICC line.


5.  Homeless, but supposedly there is local family, as per friend who actively 

resuscitated him in [place] when he collapsed. 





3.    No MV weans feasible at this time.


4.    Empiric abx coverage


5.    Enteral tube feedings, IV H2B's, Lovenox prx.


6.    IV NTG to enterally administered antihypertensives.


7.    Scrotal US study shows bilatateral hydrocles.


8.    Re-position ETT lower. 








CCU Objective





- Vital Signs / Intake & Output


Vital Signs (Last 4 hours): 


Vital Signs











  Pulse Resp BP Pulse Ox


 


 12/13/15 10:00  92 H  16  118/67  100











Intake and Output (Last 8hrs): 


 Intake & Output











 12/12/15 12/13/15 12/13/15





 22:59 06:59 14:59


 


Intake Total 1070 1695 


 


Output Total 3400 1500 


 


Balance -2330 195 


 


Intake:   


 


   675 


 


  Intake, Piggyback 450 225 


 


  Oral 0 495 


 


  Tube Feeding 220 100 


 


  Free Water Flush 100 200 


 


Output:   


 


  Urine 3400 1500 


 


    Urethral (Lua) 3400 1500 


 


Other:   


 


  # Bowel Movements 1 1 














- Medications


Active Medications: 


Active Medications











Generic Name Dose Route Start Last Admin





  Trade Name Freq  PRN Reason Stop Dose Admin


 


Enalapril Maleate  10 mg 12/10/15 11:15 12/13/15 09:02





  Vasotec  PO   10 mg





  DAILY [facility]DALENA   Administration


 


Famotidine  20 mg 12/10/15 17:00 12/13/15 09:00





  Pepcid  GT   20 mg





  BID [facility]ALENA   Administration


 


Piperacillin Sod/Tazobactam  100 mls @ 100 mls/hr 12/08/15 14:30 12/13/15 11:31





  Sod 3.375 gm/ Sodium Chloride  IVPB   100 mls/hr





  Q6H [facility]DALENA   Administration


 


Vancomycin HCl 1 gm/ Sodium  250 mls @ 125 mls/hr 12/08/15 18:45 12/13/15 11:31





  Chloride  IVPB   125 mls/hr





  Q12 MAGDALENA   Administration


 


Sodium Chloride  1,000 mls @ 75 mls/hr 12/12/15 14:45 12/13/15 05:54





  Sodium Chloride 0.9%  IV 12/13/15 14:32  75 mls/hr





  [id number] MAGDALENA   Administration


 


Insulin Human Regular  0 units 12/10/15 22:00 12/13/15 06:43





  Humulin R  SC   Not Given





  [facility]   





  Protocol   


 


Lorazepam  2 mg 12/11/15 06:58 12/13/15 08:26





  Ativan  IVP   2 mg





  Q6 PRN   Administration





  Agitation   


 


Morphine Sulfate  4 mg 12/09/15 13:10 12/13/15 08:26





  Morphine  IVP   4 mg





  Q2H PRN   Administration





  Pain, moderate (4-7)   


 


Multivitamins/Vitamin C  5 ml 12/10/15 09:00 12/13/15 08:55





  Multi-Delyn Liquid  PO   5 ml





  DAILY [facility]DALENA   Administration


 


Nitroglycerin  2 in 12/10/15 16:00 12/13/15 08:58





  Nitro-Bid 2% Oint  TOP   2 in





  Q6 [facility]DAL[facility]   Administration














- Patient Studies


Lab Studies: 


 Microbiology Studies











 12/11/15 05:21 Gram Stain - Final





 Trachasp Bronchial Culture - Final





    Klebsiella Pneumoniae Ssp Pneu


 


 12/08/15 15:10 Blood Culture - Preliminary





 Blood    NO GROWTH AFTER 4 DAYS


 


 12/10/15 13:50 Blood Culture - Preliminary





 Blood-Venous    NO GROWTH AFTER 48 HOURS


 


 12/10/15 13:50 Blood Culture - Preliminary





 Blood-Venous    NO GROWTH AFTER 48 HOURS


 


 12/08/15 12:00 Blood Culture - Preliminary





 Blood-Venous    NO GROWTH AFTER 4 DAYS


 


 12/08/15 12:00 Blood Culture - Preliminary





 Blood-Venous    NO GROWTH AFTER 4 DAYS


 


 12/08/15 Unknown Wound Culture - Final





 Other: Please Indicate    Klebsiella Pneumoniae Ssp Pneu





 Gram Stain - Final








 Lab Studies











  12/13/15 12/13/15 12/13/15 Range/Units





  12:10 05:42 04:40 


 


WBC     (4.8-10.8)  K/uL


 


RBC     (4.40-5.90)  Mil/uL


 


Hgb     (12.0-18.0)  g/dL


 


Hct     (35.0-51.0)  %


 


MCV     (80.0-94.0)  fl


 


MCH     (27.0-31.0)  pg


 


MCHC     (33.0-37.0)  g/dL


 


RDW     (11.5-14.5)  %


 


Plt Count     (130-400)  K/uL


 


MPV     (7.2-11.7)  fl


 


Neut % (Auto)     (50.0-75.0)  %


 


Lymph % (Auto)     (20.0-40.0)  %


 


Mono % (Auto)     (0.0-10.0)  %


 


Eos % (Auto)     (0.0-4.0)  %


 


Baso % (Auto)     (0.0-2.0)  %


 


Neut #     (1.8-7.0)  K/uL


 


Lymph #     (1.0-4.3)  K/uL


 


Mono #     (0.0-0.8)  K/uL


 


Eos #     (0.0-0.7)  K/uL


 


Baso #     (0.0-0.2)  K/uL


 


pCO2    45  (35-45)  mm/Hg


 


pO2    147 H  ()  mm/Hg


 


HCO3    30.6 H  (21-28)  mmol/L


 


ABG pH    7.46 H  (7.35-7.45)  


 


ABG Total CO2    33.4 H  (22-28)  mmol/L


 


ABG O2 Saturation    98.5 H  (95-98)  %


 


ABG O2 Content    14.1 L  (15-23)  ML/dL


 


ABG Base Excess    7.3 H  (-2.0-3.0)  mmol/L


 


ABG Hemoglobin    10.2 L  (11.7-17.4)  g/dL


 


ABG Carboxyhemoglobin    1.2  (0.5-1.5)  %


 


POC ABG HHb (Measured)    1.5  (0.0-5.0)  %


 


ABG Methemoglobin    1.0  (0.0-3.0)  %


 


ABG O2 Capacity    14.3 L  (16-24)  mL/dL


 


Chu Test    Yes  


 


A-a O2 Difference    225.0  mm/Hg


 


Hgb O2 Saturation    96.3  (95.0-98.0)  %


 


FiO2    60.0  %


 


Crit Value Called To    Aislinn bass  


 


Crit Value Read Back    Yes  


 


Blood Gas Notified Time    451  


 


Sodium     (132-148)  mmol/l


 


Potassium     (3.6-5.0)  MMOL/L


 


Chloride     ()  mmol/L


 


Carbon Dioxide     (22-30)  mmol/L


 


Anion Gap     (10-20)  


 


BUN     (9-20)  mg/dl


 


Creatinine     (0.8-1.5)  mg/dL


 


Est GFR (African Amer)     


 


Est GFR (Non-Af Amer)     


 


POC Glucose (mg/dL)  104  92   ()  mg/dL


 


Random Glucose     ()  mg/dL


 


Calcium     (8.4-10.2)  mg/dL














  12/13/15 12/12/15 12/12/15 Range/Units





  04:30 21:55 17:48 


 


WBC  11.4 H    (4.8-10.8)  K/uL


 


RBC  3.37 L    (4.40-5.90)  Mil/uL


 


Hgb  10.9 L    (12.0-18.0)  g/dL


 


Hct  35.6    (35.0-51.0)  %


 


MCV  105.9 H    (80.0-94.0)  fl


 


MCH  32.5 H    (27.0-31.0)  pg


 


MCHC  30.7 L    (33.0-37.0)  g/dL


 


RDW  14.0    (11.5-14.5)  %


 


Plt Count  219    (130-400)  K/uL


 


MPV  7.6    (7.2-11.7)  fl


 


Neut % (Auto)  72.7    (50.0-75.0)  %


 


Lymph % (Auto)  11.4 L    (20.0-40.0)  %


 


Mono % (Auto)  14.7 H    (0.0-10.0)  %


 


Eos % (Auto)  0.8    (0.0-4.0)  %


 


Baso % (Auto)  0.4    (0.0-2.0)  %


 


Neut #  8.3 H    (1.8-7.0)  K/uL


 


Lymph #  1.3    (1.0-4.3)  K/uL


 


Mono #  1.7 H    (0.0-0.8)  K/uL


 


Eos #  0.1    (0.0-0.7)  K/uL


 


Baso #  0.0    (0.0-0.2)  K/uL


 


pCO2     (35-45)  mm/Hg


 


pO2     ()  mm/Hg


 


HCO3     (21-28)  mmol/L


 


ABG pH     (7.35-7.45)  


 


ABG Total CO2     (22-28)  mmol/L


 


ABG O2 Saturation     (95-98)  %


 


ABG O2 Content     (15-23)  ML/dL


 


ABG Base Excess     (-2.0-3.0)  mmol/L


 


ABG Hemoglobin     (11.7-17.4)  g/dL


 


ABG Carboxyhemoglobin     (0.5-1.5)  %


 


POC ABG HHb (Measured)     (0.0-5.0)  %


 


ABG Methemoglobin     (0.0-3.0)  %


 


ABG O2 Capacity     (16-24)  mL/dL


 


Chu Test     


 


A-a O2 Difference     mm/Hg


 


Hgb O2 Saturation     (95.0-98.0)  %


 


FiO2     %


 


Crit Value Called To     


 


Crit Value Read Back     


 


Blood Gas Notified Time     


 


Sodium  148    (132-148)  mmol/l


 


Potassium  3.7    (3.6-5.0)  MMOL/L


 


Chloride  108 H    ()  mmol/L


 


Carbon Dioxide  29    (22-30)  mmol/L


 


Anion Gap  15    (10-20)  


 


BUN  14    (9-20)  mg/dl


 


Creatinine  1.1    (0.8-1.5)  mg/dL


 


Est GFR (African Amer)  > 60    


 


Est GFR (Non-Af Amer)  > 60    


 


POC Glucose (mg/dL)   104  104  ()  mg/dL


 


Random Glucose  99    ()  mg/dL


 


Calcium  9.1    (8.4-10.2)  mg/dL














  12/12/15 Range/Units





  12:38 


 


WBC   (4.8-10.8)  K/uL


 


RBC   (4.40-5.90)  Mil/uL


 


Hgb   (12.0-18.0)  g/dL


 


Hct   (35.0-51.0)  %


 


MCV   (80.0-94.0)  fl


 


MCH   (27.0-31.0)  pg


 


MCHC   (33.0-37.0)  g/dL


 


RDW   (11.5-14.5)  %


 


Plt Count   (130-400)  K/uL


 


MPV   (7.2-11.7)  fl


 


Neut % (Auto)   (50.0-75.0)  %


 


Lymph % (Auto)   (20.0-40.0)  %


 


Mono % (Auto)   (0.0-10.0)  %


 


Eos % (Auto)   (0.0-4.0)  %


 


Baso % (Auto)   (0.0-2.0)  %


 


Neut #   (1.8-7.0)  K/uL


 


Lymph #   (1.0-4.3)  K/uL


 


Mono #   (0.0-0.8)  K/uL


 


Eos #   (0.0-0.7)  K/uL


 


Baso #   (0.0-0.2)  K/uL


 


pCO2   (35-45)  mm/Hg


 


pO2   ()  mm/Hg


 


HCO3   (21-28)  mmol/L


 


ABG pH   (7.35-7.45)  


 


ABG Total CO2   (22-28)  mmol/L


 


ABG O2 Saturation   (95-98)  %


 


ABG O2 Content   (15-23)  ML/dL


 


ABG Base Excess   (-2.0-3.0)  mmol/L


 


ABG Hemoglobin   (11.7-17.4)  g/dL


 


ABG Carboxyhemoglobin   (0.5-1.5)  %


 


POC ABG HHb (Measured)   (0.0-5.0)  %


 


ABG Methemoglobin   (0.0-3.0)  %


 


ABG O2 Capacity   (16-24)  mL/dL


 


Chu Test   


 


A-a O2 Difference   mm/Hg


 


Hgb O2 Saturation   (95.0-98.0)  %


 


FiO2   %


 


Crit Value Called To   


 


Crit Value Read Back   


 


Blood Gas Notified Time   


 


Sodium   (132-148)  mmol/l


 


Potassium   (3.6-5.0)  MMOL/L


 


Chloride   ()  mmol/L


 


Carbon Dioxide   (22-30)  mmol/L


 


Anion Gap   (10-20)  


 


BUN   (9-20)  mg/dl


 


Creatinine   (0.8-1.5)  mg/dL


 


Est GFR (African Amer)   


 


Est GFR (Non-Af Amer)   


 


POC Glucose (mg/dL)  86  ()  mg/dL


 


Random Glucose   ()  mg/dL


 


Calcium   (8.4-10.2)  mg/dL








 Laboratory Results - last 24 hr











  12/12/15 12/12/15 12/12/15





  12:38 17:48 21:55


 


WBC   


 


RBC   


 


Hgb   


 


Hct   


 


MCV   


 


MCH   


 


MCHC   


 


RDW   


 


Plt Count   


 


MPV   


 


Neut % (Auto)   


 


Lymph % (Auto)   


 


Mono % (Auto)   


 


Eos % (Auto)   


 


Baso % (Auto)   


 


Neut #   


 


Lymph #   


 


Mono #   


 


Eos #   


 


Baso #   


 


pCO2   


 


pO2   


 


HCO3   


 


ABG pH   


 


ABG Total CO2   


 


ABG O2 Saturation   


 


ABG O2 Content   


 


ABG Base Excess   


 


ABG Hemoglobin   


 


ABG Carboxyhemoglobin   


 


POC ABG HHb (Measured)   


 


ABG Methemoglobin   


 


ABG O2 Capacity   


 


Chu Test   


 


A-a O2 Difference   


 


Hgb O2 Saturation   


 


FiO2   


 


Crit Value Called To   


 


Crit Value Read Back   


 


Blood Gas Notified Time   


 


Sodium   


 


Potassium   


 


Chloride   


 


Carbon Dioxide   


 


Anion Gap   


 


BUN   


 


Creatinine   


 


Est GFR ( Amer)   


 


Est GFR (Non-Af Amer)   


 


POC Glucose (mg/dL)  86  104  104


 


Random Glucose   


 


Calcium   














  12/13/15 12/13/15 12/13/15





  04:30 04:40 05:42


 


WBC  11.4 H  


 


RBC  3.37 L  


 


Hgb  10.9 L  


 


Hct  35.6  


 


MCV  105.9 H  


 


MCH  32.5 H  


 


MCHC  30.7 L  


 


RDW  14.0  


 


Plt Count  219  


 


MPV  7.6  


 


Neut % (Auto)  72.7  


 


Lymph % (Auto)  11.4 L  


 


Mono % (Auto)  14.7 H  


 


Eos % (Auto)  0.8  


 


Baso % (Auto)  0.4  


 


Neut #  8.3 H  


 


Lymph #  1.3  


 


Mono #  1.7 H  


 


Eos #  0.1  


 


Baso #  0.0  


 


pCO2   45 


 


pO2   147 H 


 


HCO3   30.6 H 


 


ABG pH   7.46 H 


 


ABG Total CO2   33.4 H 


 


ABG O2 Saturation   98.5 H 


 


ABG O2 Content   14.1 L 


 


ABG Base Excess   7.3 H 


 


ABG Hemoglobin   10.2 L 


 


ABG Carboxyhemoglobin   1.2 


 


POC ABG HHb (Measured)   1.5 


 


ABG Methemoglobin   1.0 


 


ABG O2 Capacity   14.3 L 


 


Chu Test   Yes 


 


A-a O2 Difference   225.0 


 


Hgb O2 Saturation   96.3 


 


FiO2   60.0 


 


Crit Value Called To   Aislinn bass 


 


Crit Value Read Back   Yes 


 


Blood Gas Notified Time   451 


 


Sodium  148  


 


Potassium  3.7  


 


Chloride  108 H  


 


Carbon Dioxide  29  


 


Anion Gap  15  


 


BUN  14  


 


Creatinine  1.1  


 


Est GFR ( Amer)  > 60  


 


Est GFR (Non-Af Amer)  > 60  


 


POC Glucose (mg/dL)    92


 


Random Glucose  99  


 


Calcium  9.1  














  12/13/15





  12:10


 


WBC 


 


RBC 


 


Hgb 


 


Hct 


 


MCV 


 


MCH 


 


MCHC 


 


RDW 


 


Plt Count 


 


MPV 


 


Neut % (Auto) 


 


Lymph % (Auto) 


 


Mono % (Auto) 


 


Eos % (Auto) 


 


Baso % (Auto) 


 


Neut # 


 


Lymph # 


 


Mono # 


 


Eos # 


 


Baso # 


 


pCO2 


 


pO2 


 


HCO3 


 


ABG pH 


 


ABG Total CO2 


 


ABG O2 Saturation 


 


ABG O2 Content 


 


ABG Base Excess 


 


ABG Hemoglobin 


 


ABG Carboxyhemoglobin 


 


POC ABG HHb (Measured) 


 


ABG Methemoglobin 


 


ABG O2 Capacity 


 


Chu Test 


 


A-a O2 Difference 


 


Hgb O2 Saturation 


 


FiO2 


 


Crit Value Called To 


 


Crit Value Read Back 


 


Blood Gas Notified Time 


 


Sodium 


 


Potassium 


 


Chloride 


 


Carbon Dioxide 


 


Anion Gap 


 


BUN 


 


Creatinine 


 


Est GFR ( Amer) 


 


Est GFR (Non-Af Amer) 


 


POC Glucose (mg/dL)  104


 


Random Glucose 


 


Calcium 











Fingerstick Blood Sugar Results: 92

## 2015-12-13 NOTE — CON
DATE: 12/13/2015



REFERRING PHYSICIAN:  Dr. Violette Walker through [facility].



HISTORY OF PRESENT ILLNESS:  This is an unfortunate 55-year-old homeless gentleman [facility] remains unresponsive on mechanical [facility], receiving NG tube feedings, who was r
eferred for evaluation for percutaneous endoscopic gastrostomy tube feeding since long-term he does n
ot have a good prognosis in terms of being able to take in p.o. on his own.  Unfortunately, there is 
no family from the patient to give consent, and the patient obviously cannot give any history.  All i
s obtained from the chart.



PHYSICAL EXAMINATION:

GENERAL:  He is a well-developed gentleman who is unresponsive, sedated on mechanical ventilation.

VITAL SIGNS:  Presently stable.  He has an ET tube in place.  He has an NG tube in place.  He has a F
oley in place.

ABDOMEN:  Soft, positive bowel sounds, nontender, nondistended.  No hepatosplenomegaly is appreciated
 on exam.



LABORATORY DATA:  CBC is remarkable for a white count of 11.4 with hemoglobin 10.9 and platelet count
 of 219.  SMA-7 is unremarkable.



IMPRESSION AND PLAN:  A 55-year-old gentleman who is referred for percutaneous endoscopic gastrostomy
 tube insertion.  He is going to be having a tracheostomy being done tomorrow by the surgeons. I have
 requested for the surgeon at the time of the tracheostomy that they perform feeding tube procedures,
 whether it be a G tube or a J tube, and I will wait for their input whether or not that could be don
e.  If not, we will then have to schedule the PEG tube to be done on Tuesday or Wednesday post-trache
ostomy.  Will probably need a 2-physician consent for that since there is no family available for alondra
s gentleman.  I will make the appropriate preparations at that time.  Thank you for this referral.





__________________________________________

Haim Rodriguez MD







cc:



DD: 12/13/2015 12:59:38  162

TT: 12/13/2015 15:04:21

Job # [id number]

rn

## 2015-12-14 LAB
ALBUMIN SERPL-MCNC: 2.9 G/DL (ref 3.5–5)
ALBUMIN/GLOB SERPL: 0.9 {RATIO} (ref 1–2.1)
ALT SERPL-CCNC: 30 U/L (ref 21–72)
APTT BLD: 36.2 SECONDS (ref 23.1–32)
ARTERIAL BLOOD GAS HEMOGLOBIN: 10.4 G/DL (ref 11.7–17.4)
ARTERIAL BLOOD GAS O2 SAT: 97.8 % (ref 95–98)
ARTERIAL BLOOD GAS PCO2: 44 MM/HG (ref 35–45)
ARTERIAL BLOOD GAS TCO2: 32.7 MMOL/L (ref 22–28)
ARTERIAL PATENCY WRIST A: YES
AST SERPL-CCNC: 29 U/L (ref 17–59)
BASOPHILS # BLD AUTO: 0 K/UL (ref 0–0.2)
BASOPHILS NFR BLD: 0.4 % (ref 0–2)
BUN SERPL-MCNC: 13 MG/DL (ref 9–20)
CALCIUM SERPL-MCNC: 8.5 MG/DL (ref 8.4–10.2)
EOSINOPHIL # BLD AUTO: 0.2 K/UL (ref 0–0.7)
EOSINOPHIL NFR BLD: 2.5 % (ref 0–4)
ERYTHROCYTE [DISTWIDTH] IN BLOOD BY AUTOMATED COUNT: 13.7 % (ref 11.5–14.5)
GFR NON-AFRICAN AMERICAN: > 60
HCO3 BLDA-SCNC: 30.1 MMOL/L (ref 21–28)
HGB BLD-MCNC: 9.8 G/DL (ref 12–18)
INHALED O2 CONCENTRATION: 50 %
INR PPP: 1.05 (ref 0.89–1.2)
LYMPHOCYTES # BLD AUTO: 1.2 K/UL (ref 1–4.3)
LYMPHOCYTES NFR BLD AUTO: 15 % (ref 20–40)
MCH RBC QN AUTO: 33.3 PG (ref 27–31)
MCHC RBC AUTO-ENTMCNC: 31.5 G/DL (ref 33–37)
MCV RBC AUTO: 105.7 FL (ref 80–94)
MONOCYTES # BLD: 1.3 K/UL (ref 0–0.8)
MONOCYTES NFR BLD: 16 % (ref 0–10)
NEUTROPHILS # BLD: 5.4 K/UL (ref 1.8–7)
NEUTROPHILS NFR BLD AUTO: 66.1 % (ref 50–75)
NRBC BLD AUTO-RTO: 0 % (ref 0–0)
O2 CAP BLDA-SCNC: 14.4 ML/DL (ref 16–24)
O2 CT BLDA-SCNC: 14.1 ML/DL (ref 15–23)
PH BLDA: 7.46 [PH] (ref 7.35–7.45)
PLATELET # BLD: 207 K/UL (ref 130–400)
PMV BLD AUTO: 7.8 FL (ref 7.2–11.7)
PO2 BLDA: 91 MM/HG (ref 80–100)
PROTHROMBIN TIME: 11.2 SECONDS (ref 9.4–12)
RBC # BLD AUTO: 2.94 MIL/UL (ref 4.4–5.9)
WBC # BLD AUTO: 8.2 K/UL (ref 4.8–10.8)

## 2015-12-14 RX ADMIN — NITROGLYCERIN SCH IN: 20 OINTMENT TOPICAL at 16:38

## 2015-12-14 RX ADMIN — POTASSIUM CHLORIDE SCH MLS/HR: 14.9 INJECTION, SOLUTION INTRAVENOUS at 09:17

## 2015-12-14 RX ADMIN — POTASSIUM CHLORIDE SCH MLS/HR: 14.9 INJECTION, SOLUTION INTRAVENOUS at 12:52

## 2015-12-14 RX ADMIN — NITROGLYCERIN SCH IN: 20 OINTMENT TOPICAL at 09:18

## 2015-12-14 RX ADMIN — NITROGLYCERIN SCH IN: 20 OINTMENT TOPICAL at 04:00

## 2015-12-14 RX ADMIN — POTASSIUM CHLORIDE SCH MLS/HR: 14.9 INJECTION, SOLUTION INTRAVENOUS at 11:44

## 2015-12-14 RX ADMIN — POTASSIUM CHLORIDE SCH MLS/HR: 14.9 INJECTION, SOLUTION INTRAVENOUS at 10:55

## 2015-12-14 RX ADMIN — NITROGLYCERIN SCH IN: 20 OINTMENT TOPICAL at 21:19

## 2015-12-14 RX ADMIN — Medication SCH: at 08:23

## 2015-12-14 NOTE — CP.PCM.PN
Subjective





- Subjective


Subjective: 


no overnight events





Objective





- Vital Signs/Intake and Output


Vital Signs (last 24 hours): 


 Vital Signs - 24 hr











  12/13/15 12/13/15 12/13/15





  16:00 18:00 19:00


 


Temperature 100.8 F H  


 


Pulse Rate 103 H 107 H 107 H


 


Respiratory 16 15 15





Rate   


 


Blood Pressure 119/70 125/81 130/82


 


O2 Sat by Pulse 99 100 100





Oximetry   














  12/13/15 12/13/15 12/13/15





  20:00 21:00 22:00


 


Temperature 101.1 F H  


 


Pulse Rate 110 H 122 H 108 H


 


Respiratory 14 20 16





Rate   


 


Blood Pressure 159/85 H 126/71 130/73


 


O2 Sat by Pulse 100 96 99





Oximetry   














  12/13/15 12/14/15 12/14/15





  23:00 00:00 01:00


 


Temperature  101.3 F H 


 


Pulse Rate 101 H 108 H 111 H


 


Respiratory 16 16 23





Rate   


 


Blood Pressure 143/80 139/84 120/83


 


O2 Sat by Pulse 100 99 96





Oximetry   














  12/14/15 12/14/15 12/14/15





  02:00 03:00 04:00


 


Temperature   101.3 F H


 


Pulse Rate 107 H 107 H 99 H


 


Respiratory 17 16 16





Rate   


 


Blood Pressure 138/80 134/78 137/80


 


O2 Sat by Pulse 99 98 98





Oximetry   














  12/14/15 12/14/15 12/14/15





  05:00 06:00 07:00


 


Temperature   


 


Pulse Rate 105 H 108 H 108 H


 


Respiratory 16 17 17





Rate   


 


Blood Pressure 153/107 H 158/94 H 153/95 H


 


O2 Sat by Pulse 98 98 97





Oximetry   














  12/14/15 12/14/15 12/14/15





  08:00 09:00 10:00


 


Temperature 101.4 F H 101.2 F H 100.4 F H


 


Pulse Rate 95 H 94 H 89


 


Respiratory 16 16 16





Rate   


 


Blood Pressure 141/78 134/85 138/89


 


O2 Sat by Pulse 98 98 100





Oximetry   














  12/14/15 12/14/15 12/14/15





  11:00 12:00 13:00


 


Temperature 100.4 F H 100.2 F H 99.9 F H


 


Pulse Rate 93 H 98 H 92 H


 


Respiratory 16 17 16





Rate   


 


Blood Pressure 150/96 H  159/102 H


 


O2 Sat by Pulse 100 100 99





Oximetry   











Intake and Output (last 12 hours): 


 Intake & Output











 12/13/15 12/14/15 12/14/15





 18:59 06:59 18:59


 


Intake Total 1977 1590 883


 


Output Total 600 845 


 


Balance 1377 745 883


 


Intake:   


 


  IV 1027 1020 333


 


  Intake, Piggyback 350 250 550


 


  Oral   0


 


  Tube Feeding 600 220 


 


  Free Water Flush  100 


 


Output:   


 


  Urine 600 845 


 


    Urethral (Lua) 600 845 


 


Other:   


 


  # Bowel Movements 2 0 














- Medications


Medications: 


 Current Medications





Acetaminophen (Tylenol 650mg/20.3ml Solution Ud)  650 mg PO Q6 PRN


   PRN Reason: Fever


   Last Admin: 12/13/15 20:07 Dose:  650 mg


Acetaminophen (Tylenol 650 Mg Supp)  650 mg SC Q6 PRN


   PRN Reason: fevers


   Last Admin: 12/14/15 06:53 Dose:  650 mg


Aspirin (Ecotrin)  81 mg PO DAILY [facility]


   Last Admin: 12/14/15 08:23 Dose:  Not Given


Enalapril Maleate (Vasotec)  10 mg PO DAILY [facility]


   Last Admin: 12/14/15 08:25 Dose:  Not Given


Famotidine (Pepcid)  20 mg GT BID [facility]


   Last Admin: 12/14/15 08:23 Dose:  Not Given


Vancomycin HCl 1 gm/ Sodium (Chloride)  250 mls @ 125 mls/hr IVPB Q12 [facility]


   Last Admin: 12/14/15 08:24 Dose:  125 mls/hr


Ceftriaxone Sodium 2 gm/ (Sodium Chloride)  100 mls @ 100 mls/hr IVPB DAILY [facility]


   Last Admin: 12/14/15 08:24 Dose:  100 mls/hr


Insulin Human Regular (Humulin R)  0 units SC ACHS [facility]


   PRN Reason: Protocol


   Last Admin: 12/14/15 11:43 Dose:  Not Given


Lorazepam (Ativan)  2 mg IVP Q6 PRN


   PRN Reason: Agitation


   Last Admin: 12/14/15 13:05 Dose:  2 mg


Morphine Sulfate (Morphine)  4 mg IVP Q2H PRN


   PRN Reason: Pain, moderate (4-7)


   Last Admin: 12/14/15 02:33 Dose:  4 mg


Multivitamins/Vitamin C (Multi-Delyn Liquid)  5 ml PO DAILY [facility]


   Last Admin: 12/14/15 08:23 Dose:  Not Given


Nitroglycerin (Nitro-Bid 2% Oint)  2 in TOP Q6 [facility]


   Last Admin: 12/14/15 09:18 Dose:  2 in











- Labs


Labs (last 24 hours): 


 Laboratory Results - last 24 hr











  12/13/15 12/13/15 12/13/15





  16:09 21:31 21:47


 


WBC   


 


RBC   


 


Hgb   


 


Hct   


 


MCV   


 


MCH   


 


MCHC   


 


RDW   


 


Plt Count   


 


MPV   


 


Neut % (Auto)   


 


Lymph % (Auto)   


 


Mono % (Auto)   


 


Eos % (Auto)   


 


Baso % (Auto)   


 


Neut #   


 


Lymph #   


 


Mono #   


 


Eos #   


 


Baso #   


 


PT   


 


INR   


 


APTT   


 


pCO2   49 H 


 


pO2   127 H 


 


HCO3   30.5 H 


 


ABG pH   7.43 


 


ABG Total CO2   34.0 H 


 


ABG O2 Saturation   98.6 H 


 


ABG O2 Content   15.0 


 


ABG Base Excess   7.1 H 


 


ABG Hemoglobin   10.9 L 


 


ABG Carboxyhemoglobin   1.6 H 


 


POC ABG HHb (Measured)   1.4 


 


ABG Methemoglobin   0.7 


 


ABG O2 Capacity   15.2 L 


 


Chu Test   Yes 


 


A-a O2 Difference   240.0 


 


Hgb O2 Saturation   96.3 


 


FiO2   60.0 


 


Sodium   


 


Potassium   


 


Chloride   


 


Carbon Dioxide   


 


Anion Gap   


 


BUN   


 


Creatinine   


 


Est GFR ( Amer)   


 


Est GFR (Non-Af Amer)   


 


POC Glucose (mg/dL)  108   100


 


Random Glucose   


 


Calcium   


 


Total Bilirubin   


 


AST   


 


ALT   


 


Alkaline Phosphatase   


 


Total Protein   


 


Albumin   


 


Globulin   


 


Albumin/Globulin Ratio   


 


Random Vancomycin   


 


Blood Type   


 


Blood Type Confirm   


 


Antibody Screen   


 


BBK History Checked   














  12/14/15 12/14/15 12/14/15





  05:20 05:21 06:02


 


WBC  8.2  


 


RBC  2.94 L  


 


Hgb  9.8 L  


 


Hct  31.1 L  


 


MCV  105.7 H  


 


MCH  33.3 H  


 


MCHC  31.5 L  


 


RDW  13.7  


 


Plt Count  207  


 


MPV  7.8  


 


Neut % (Auto)  66.1  


 


Lymph % (Auto)  15.0 L  


 


Mono % (Auto)  16.0 H  


 


Eos % (Auto)  2.5  


 


Baso % (Auto)  0.4  


 


Neut #  5.4  


 


Lymph #  1.2  


 


Mono #  1.3 H  


 


Eos #  0.2  


 


Baso #  0.0  


 


PT  11.2  


 


INR  1.05  


 


APTT  36.2 H  


 


pCO2   44 


 


pO2   91 


 


HCO3   30.1 H 


 


ABG pH   7.46 H 


 


ABG Total CO2   32.7 H 


 


ABG O2 Saturation   97.8 


 


ABG O2 Content   14.1 L 


 


ABG Base Excess   6.7 H 


 


ABG Hemoglobin   10.4 L 


 


ABG Carboxyhemoglobin   1.5 


 


POC ABG HHb (Measured)   2.2 


 


ABG Methemoglobin   0.8 


 


ABG O2 Capacity   14.4 L 


 


Chu Test   Yes 


 


A-a O2 Difference   211.0 


 


Hgb O2 Saturation   95.6 


 


FiO2   50.0 


 


Sodium  147  


 


Potassium  3.2 L  


 


Chloride  110 H  


 


Carbon Dioxide  28  


 


Anion Gap  12  


 


BUN  13  


 


Creatinine  1.1  


 


Est GFR ( Amer)  > 60  


 


Est GFR (Non-Af Amer)  > 60  


 


POC Glucose (mg/dL)    78


 


Random Glucose  88  


 


Calcium  8.5  


 


Total Bilirubin  0.5  


 


AST  29  


 


ALT  30  


 


Alkaline Phosphatase  86  


 


Total Protein  6.3  


 


Albumin  2.9 L  


 


Globulin  3.4  


 


Albumin/Globulin Ratio  0.9 L  


 


Random Vancomycin  9.0  


 


Blood Type  O POSITIVE  


 


Blood Type Confirm   


 


Antibody Screen  Negative  


 


BBK History Checked  No verified bt  














  12/14/15 12/14/15





  07:45 11:23


 


WBC  


 


RBC  


 


Hgb  


 


Hct  


 


MCV  


 


MCH  


 


MCHC  


 


RDW  


 


Plt Count  


 


MPV  


 


Neut % (Auto)  


 


Lymph % (Auto)  


 


Mono % (Auto)  


 


Eos % (Auto)  


 


Baso % (Auto)  


 


Neut #  


 


Lymph #  


 


Mono #  


 


Eos #  


 


Baso #  


 


PT  


 


INR  


 


APTT  


 


pCO2  


 


pO2  


 


HCO3  


 


ABG pH  


 


ABG Total CO2  


 


ABG O2 Saturation  


 


ABG O2 Content  


 


ABG Base Excess  


 


ABG Hemoglobin  


 


ABG Carboxyhemoglobin  


 


POC ABG HHb (Measured)  


 


ABG Methemoglobin  


 


ABG O2 Capacity  


 


Chu Test  


 


A-a O2 Difference  


 


Hgb O2 Saturation  


 


FiO2  


 


Sodium  


 


Potassium  


 


Chloride  


 


Carbon Dioxide  


 


Anion Gap  


 


BUN  


 


Creatinine  


 


Est GFR ( Amer)  


 


Est GFR (Non-Af Amer)  


 


POC Glucose (mg/dL)   61 L


 


Random Glucose  


 


Calcium  


 


Total Bilirubin  


 


AST  


 


ALT  


 


Alkaline Phosphatase  


 


Total Protein  


 


Albumin  


 


Globulin  


 


Albumin/Globulin Ratio  


 


Random Vancomycin  


 


Blood Type  


 


Blood Type Confirm  O POSITIVE 


 


Antibody Screen  


 


BBK History Checked  














- GI/Abdominal Exam


GI & Abdominal Exam: Normal Bowel Sounds, Soft





Assessment/Plan





- Assessment and Plan (Free Text)


Assessment: 


56 yo male with dysphagia





peg when able

## 2015-12-14 NOTE — CP.PCM.PN
Subjective





- Subjective


Subjective: 


pt self extubated overnight kelechi was reintubated by dr holland-hospitalist. at 

present stable on propofol and vent.


pt is for trach/peg/picc today


still febrile, ID consult pending





Review of Systems





- Review of Systems


Systems not reviewed;Unavailable: Intubated





- Constitutional


Constitutional: Fever





- EENT


Eyes: UNREMARKABLE


Ears: UNREMARKABLE


Nose/Mouth/Throat: UNREMARKABLE





- Cardiovascular


Cardiovascular: UNREMARKABLE





- Respiratory


Respiratory: As Per HPI, Excessive Mucous Production





- Gastrointestinal


Gastrointestinal: UNREMARKABLE





- Genitourinary


Genitourinary: UNREMARKABLE





- Reproductive: Male


Reproductive:Male: UNREMARKABLE





- Musculoskeletal


Musculoskeletal: UNREMARKABLE





- Integumentary


Integumentary: UNREMARKABLE





- Neurological


Neurological: UNREMARKABLE





- Psychiatric


Psychiatric: UNREMARKABLE





- Endocrine


Endocrine: UNREMARKABLE





- Hematologic/Lymphatic


Hematologic: UNREMARKABLE





Objective





- Vital Signs/Intake and Output


Vital Signs (last 24 hours): 


 Vital Signs - 24 hr











  12/13/15 12/13/15 12/13/15





  08:00 10:00 12:00


 


Temperature 100.6 F H  100.9 F H


 


Pulse Rate 112 H 92 H 100 H


 


Respiratory 21 16 16





Rate   


 


Blood Pressure 144/90 118/67 127/71


 


O2 Sat by Pulse 98 100 100





Oximetry   














  12/13/15 12/13/15 12/13/15





  14:00 16:00 18:00


 


Temperature  100.8 F H 


 


Pulse Rate 107 H 103 H 107 H


 


Respiratory 18 16 15





Rate   


 


Blood Pressure 124/73 119/70 125/81


 


O2 Sat by Pulse 99 99 100





Oximetry   














  12/13/15 12/13/15 12/13/15





  19:00 20:00 21:00


 


Temperature  101.1 F H 


 


Pulse Rate 107 H 110 H 122 H


 


Respiratory 15 14 20





Rate   


 


Blood Pressure 130/82 159/85 H 126/71


 


O2 Sat by Pulse 100 100 96





Oximetry   














  12/13/15 12/13/15 12/14/15





  22:00 23:00 00:00


 


Temperature   101.3 F H


 


Pulse Rate 108 H 101 H 108 H


 


Respiratory 16 16 16





Rate   


 


Blood Pressure 130/73 143/80 139/84


 


O2 Sat by Pulse 99 100 99





Oximetry   














  12/14/15 12/14/15 12/14/15





  01:00 02:00 03:00


 


Temperature   


 


Pulse Rate 111 H 107 H 107 H


 


Respiratory 23 17 16





Rate   


 


Blood Pressure 120/83 138/80 134/78


 


O2 Sat by Pulse 96 99 98





Oximetry   














  12/14/15 12/14/15 12/14/15





  04:00 05:00 06:00


 


Temperature 101.3 F H  


 


Pulse Rate 99 H 105 H 108 H


 


Respiratory 16 16 17





Rate   


 


Blood Pressure 137/80 153/107 H 158/94 H


 


O2 Sat by Pulse 98 98 98





Oximetry   














  12/14/15





  07:00


 


Temperature 


 


Pulse Rate 108 H


 


Respiratory 17





Rate 


 


Blood Pressure 153/95 H


 


O2 Sat by Pulse 97





Oximetry 











Intake and Output (last 12 hours): 


 Intake & Output











 12/13/15 12/14/15 12/14/15





 18:59 06:59 18:59


 


Intake Total 1977 1590 


 


Output Total 600 845 


 


Balance 1377 745 


 


Intake:   


 


  IV 1027 1020 


 


  Intake, Piggyback 350 250 


 


  Tube Feeding 600 220 


 


  Free Water Flush  100 


 


Output:   


 


  Urine 600 845 


 


    Urethral (Mcgee) 600 845 


 


Other:   


 


  # Bowel Movements 2 0 














- Medications


Medications: 


 Current Medications





Acetaminophen (Tylenol 650mg/20.3ml Solution Ud)  650 mg PO Q6 PRN


   PRN Reason: Fever


   Last Admin: 12/13/15 20:07 Dose:  650 mg


Acetaminophen (Tylenol 650 Mg Supp)  650 mg CA Q6 PRN


   PRN Reason: fevers


   Last Admin: 12/14/15 06:53 Dose:  650 mg


Aspirin (Ecotrin)  81 mg PO DAILY [facility]


   Last Admin: 12/13/15 17:20 Dose:  81 mg


Enalapril Maleate (Vasotec)  10 mg PO DAILY [facility]


   Last Admin: 12/13/15 09:02 Dose:  10 mg


Famotidine (Pepcid)  20 mg GT BID [facility]


   Last Admin: 12/13/15 17:22 Dose:  20 mg


Vancomycin HCl 1 gm/ Sodium (Chloride)  250 mls @ 125 mls/hr IVPB Q12 [facility]


   Last Admin: 12/13/15 20:21 Dose:  125 mls/hr


Ceftriaxone Sodium 2 gm/ (Sodium Chloride)  100 mls @ 100 mls/hr IVPB DAILY [facility]


   Last Admin: 12/13/15 17:23 Dose:  100 mls/hr


Propofol (Diprivan)  100 mls @ 2.041 mls/hr IV .Q24H [facility]; 5 MCG/KG/MIN


   PRN Reason: Protocol


   Stop: 12/14/15 13:28


   Last Admin: 12/14/15 05:55 Dose:  2.041 mls/hr


Insulin Human Regular (Humulin R)  0 units SC ACHS [facility]


   PRN Reason: Protocol


   Last Admin: 12/14/15 06:46 Dose:  Not Given


Lorazepam (Ativan)  2 mg IVP Q6 PRN


   PRN Reason: Agitation


   Last Admin: 12/13/15 20:07 Dose:  2 mg


Morphine Sulfate (Morphine)  4 mg IVP Q2H PRN


   PRN Reason: Pain, moderate (4-7)


   Last Admin: 12/14/15 02:33 Dose:  4 mg


Multivitamins/Vitamin C (Multi-Delyn Liquid)  5 ml PO DAILY [facility]


   Last Admin: 12/13/15 08:55 Dose:  5 ml


Nitroglycerin (Nitro-Bid 2% Oint)  2 in TOP Q6 [facility]


   Last Admin: 12/14/15 04:00 Dose:  2 in











- Labs


Labs (last 24 hours): 


 Laboratory Results - last 24 hr











  12/13/15 12/13/15 12/13/15





  12:10 16:09 21:31


 


WBC   


 


RBC   


 


Hgb   


 


Hct   


 


MCV   


 


MCH   


 


MCHC   


 


RDW   


 


Plt Count   


 


MPV   


 


Neut % (Auto)   


 


Lymph % (Auto)   


 


Mono % (Auto)   


 


Eos % (Auto)   


 


Baso % (Auto)   


 


Neut #   


 


Lymph #   


 


Mono #   


 


Eos #   


 


Baso #   


 


PT   


 


INR   


 


APTT   


 


pCO2    49 H


 


pO2    127 H


 


HCO3    30.5 H


 


ABG pH    7.43


 


ABG Total CO2    34.0 H


 


ABG O2 Saturation    98.6 H


 


ABG O2 Content    15.0


 


ABG Base Excess    7.1 H


 


ABG Hemoglobin    10.9 L


 


ABG Carboxyhemoglobin    1.6 H


 


POC ABG HHb (Measured)    1.4


 


ABG Methemoglobin    0.7


 


ABG O2 Capacity    15.2 L


 


Chu Test    Yes


 


A-a O2 Difference    240.0


 


Hgb O2 Saturation    96.3


 


FiO2    60.0


 


Sodium   


 


Potassium   


 


Chloride   


 


Carbon Dioxide   


 


Anion Gap   


 


BUN   


 


Creatinine   


 


Est GFR ( Amer)   


 


Est GFR (Non-Af Amer)   


 


POC Glucose (mg/dL)  104  108 


 


Random Glucose   


 


Calcium   


 


Total Bilirubin   


 


AST   


 


ALT   


 


Alkaline Phosphatase   


 


Total Protein   


 


Albumin   


 


Globulin   


 


Albumin/Globulin Ratio   


 


Random Vancomycin   


 


Blood Type   


 


Antibody Screen   


 


BBK History Checked   














  12/13/15 12/14/15 12/14/15





  21:47 05:20 05:21


 


WBC   8.2 


 


RBC   2.94 L 


 


Hgb   9.8 L 


 


Hct   31.1 L 


 


MCV   105.7 H 


 


MCH   33.3 H 


 


MCHC   31.5 L 


 


RDW   13.7 


 


Plt Count   207 


 


MPV   7.8 


 


Neut % (Auto)   66.1 


 


Lymph % (Auto)   15.0 L 


 


Mono % (Auto)   16.0 H 


 


Eos % (Auto)   2.5 


 


Baso % (Auto)   0.4 


 


Neut #   5.4 


 


Lymph #   1.2 


 


Mono #   1.3 H 


 


Eos #   0.2 


 


Baso #   0.0 


 


PT   11.2 


 


INR   1.05 


 


APTT   36.2 H 


 


pCO2    44


 


pO2    91


 


HCO3    30.1 H


 


ABG pH    7.46 H


 


ABG Total CO2    32.7 H


 


ABG O2 Saturation    97.8


 


ABG O2 Content    14.1 L


 


ABG Base Excess    6.7 H


 


ABG Hemoglobin    10.4 L


 


ABG Carboxyhemoglobin    1.5


 


POC ABG HHb (Measured)    2.2


 


ABG Methemoglobin    0.8


 


ABG O2 Capacity    14.4 L


 


Chu Test    Yes


 


A-a O2 Difference    211.0


 


Hgb O2 Saturation    95.6


 


FiO2    50.0


 


Sodium   147 


 


Potassium   3.2 L 


 


Chloride   110 H 


 


Carbon Dioxide   28 


 


Anion Gap   12 


 


BUN   13 


 


Creatinine   1.1 


 


Est GFR ( Amer)   > 60 


 


Est GFR (Non-Af Amer)   > 60 


 


POC Glucose (mg/dL)  100  


 


Random Glucose   88 


 


Calcium   8.5 


 


Total Bilirubin   0.5 


 


AST   29 


 


ALT   30 


 


Alkaline Phosphatase   86 


 


Total Protein   6.3 


 


Albumin   2.9 L 


 


Globulin   3.4 


 


Albumin/Globulin Ratio   0.9 L 


 


Random Vancomycin   9.0 


 


Blood Type   O POSITIVE 


 


Antibody Screen   Negative 


 


BBK History Checked   No verified bt 














  12/14/15





  06:02


 


WBC 


 


RBC 


 


Hgb 


 


Hct 


 


MCV 


 


MCH 


 


MCHC 


 


RDW 


 


Plt Count 


 


MPV 


 


Neut % (Auto) 


 


Lymph % (Auto) 


 


Mono % (Auto) 


 


Eos % (Auto) 


 


Baso % (Auto) 


 


Neut # 


 


Lymph # 


 


Mono # 


 


Eos # 


 


Baso # 


 


PT 


 


INR 


 


APTT 


 


pCO2 


 


pO2 


 


HCO3 


 


ABG pH 


 


ABG Total CO2 


 


ABG O2 Saturation 


 


ABG O2 Content 


 


ABG Base Excess 


 


ABG Hemoglobin 


 


ABG Carboxyhemoglobin 


 


POC ABG HHb (Measured) 


 


ABG Methemoglobin 


 


ABG O2 Capacity 


 


Chu Test 


 


A-a O2 Difference 


 


Hgb O2 Saturation 


 


FiO2 


 


Sodium 


 


Potassium 


 


Chloride 


 


Carbon Dioxide 


 


Anion Gap 


 


BUN 


 


Creatinine 


 


Est GFR ( Amer) 


 


Est GFR (Non-Af Amer) 


 


POC Glucose (mg/dL)  78


 


Random Glucose 


 


Calcium 


 


Total Bilirubin 


 


AST 


 


ALT 


 


Alkaline Phosphatase 


 


Total Protein 


 


Albumin 


 


Globulin 


 


Albumin/Globulin Ratio 


 


Random Vancomycin 


 


Blood Type 


 


Antibody Screen 


 


BBK History Checked 














- Constitutional


Appears: Non-toxic, No Acute Distress, Chronically Ill





- Head Exam


Head Exam: ATRAUMATIC, NORMAL INSPECTION, NORMOCEPHALIC





- Eye Exam


Eye Exam: EOMI





- Respiratory Exam


Respiratory Exam: Clear to PA & Lateral


Additional comments: 


vented





- Cardiovascular Exam


Cardiovascular Exam: Tachycardia, REGULAR RHYTHM





- GI/Abdominal Exam


GI & Abdominal Exam: Normal Bowel Sounds, Soft, Unremarkable





- Extremities Exam


Extremities exam: normal capillary refill, normal inspection, pedal pulses 

present





- Neurological Exam


Additional comments: 


sedated





- Skin


Skin Exam: Dry, Intact, Normal Color, Warm





Assessment/Plan


(1) Cardiac arrest


Current Visit: Yes   Status: Acute


Comment: s/p acls proceudres, ST w/ periods of SB


echo


cardio consult


pt remains on propofol for agitation


hr remains elevated unsure if this is related to fever, agitation or other 

etiology


cath when stable as per cardio


s/p cooling protocol


no cardiac intervention planned at present








(2) DVT prophylaxis


Current Visit: Yes   Status: Acute


Comment: lovenox








(3) Scrotal edema


Current Visit: Yes   Status: Acute


Comment: scrotal us-noted


urology consult-dr ocrtes to see pt when stable








(4) History of seizure


Current Visit: Yes   Status: Acute


Comment: neuro consult








(5) Diabetes mellitus with hyperglycemia


Current Visit: Yes   Status: Acute


Comment: riss per protocol








(6) Agitation


Current Visit: Yes   Status: Acute


Comment: cont propofol for agitation


unsure if this is lower brainstem activity vs seizure activity vs other etiology








(7) UTI (urinary tract infection)


Current Visit: Yes   Status: Acute


Comment: cont anbx


mcgee cath


uro consult pending








(8) Aspiration pneumonia


Current Visit: Yes   Status: Acute


Comment: worsened cxr, cont anbx, ID condult, trach placement











- Assessment and Plan (Free Text)


Assessment: 


pt is for peg/trach today in [place]


picc placement


cont all anbx, ID consult


safety measures in place

## 2015-12-14 NOTE — CP.PCM.PN
Subjective





- Subjective


Subjective: 


no clinical change.





Review of Systems





- Review of Systems


Systems not reviewed;Unavailable: Intubated





Objective





- Vital Signs/Intake and Output


Vital Signs (last 24 hours): 


 Vital Signs - 24 hr











  12/13/15 12/13/15 12/13/15





  21:00 22:00 23:00


 


Temperature   


 


Pulse Rate 122 H 108 H 101 H


 


Respiratory 20 16 16





Rate   


 


Blood Pressure 126/71 130/73 143/80


 


O2 Sat by Pulse 96 99 100





Oximetry   














  12/14/15 12/14/15 12/14/15





  00:00 01:00 02:00


 


Temperature 101.3 F H  


 


Pulse Rate 108 H 111 H 107 H


 


Respiratory 16 23 17





Rate   


 


Blood Pressure 139/84 120/83 138/80


 


O2 Sat by Pulse 99 96 99





Oximetry   














  12/14/15 12/14/15 12/14/15





  03:00 04:00 05:00


 


Temperature  101.3 F H 


 


Pulse Rate 107 H 99 H 105 H


 


Respiratory 16 16 16





Rate   


 


Blood Pressure 134/78 137/80 153/107 H


 


O2 Sat by Pulse 98 98 98





Oximetry   














  12/14/15 12/14/15 12/14/15





  06:00 07:00 08:00


 


Temperature   101.4 F H


 


Pulse Rate 108 H 108 H 95 H


 


Respiratory 17 17 16





Rate   


 


Blood Pressure 158/94 H 153/95 H 141/78


 


O2 Sat by Pulse 98 97 98





Oximetry   














  12/14/15 12/14/15 12/14/15





  09:00 10:00 11:00


 


Temperature 101.2 F H 100.4 F H 100.4 F H


 


Pulse Rate 94 H 89 93 H


 


Respiratory 16 16 16





Rate   


 


Blood Pressure 134/85 138/89 150/96 H


 


O2 Sat by Pulse 98 100 100





Oximetry   














  12/14/15 12/14/15 12/14/15





  12:00 13:00 14:00


 


Temperature 100.2 F H 99.9 F H 99.9 F H


 


Pulse Rate 98 H 92 H 85


 


Respiratory 17 16 16





Rate   


 


Blood Pressure  159/102 H 130/83


 


O2 Sat by Pulse 100 99 100





Oximetry   














  12/14/15 12/14/15 12/14/15





  15:00 16:00 17:00


 


Temperature 99 F 99 F 99.7 F H


 


Pulse Rate 98 H 88 95 H


 


Respiratory 17 16 17





Rate   


 


Blood Pressure 152/83 H 126/78 130/83


 


O2 Sat by Pulse 99 100 100





Oximetry   














  12/14/15





  18:00


 


Temperature 100.4 F H


 


Pulse Rate 97 H


 


Respiratory 18





Rate 


 


Blood Pressure 168/117 H


 


O2 Sat by Pulse 100





Oximetry 











Intake and Output (last 12 hours): 


 Intake & Output











 12/14/15 12/14/15 12/15/15





 06:59 18:59 06:59


 


Intake Total 1590 1823 


 


Output Total 845 1400 


 


Balance 745 423 


 


Intake:   


 


  IV 1020 843 


 


  Intake, Piggyback 250 550 


 


  Oral  0 


 


  Tube Feeding 220 330 


 


  Free Water Flush 100 100 


 


Output:   


 


  Urine 845 900 


 


    Urethral (Lua) 845 900 


 


  Stool  500 


 


Other:   


 


  # Bowel Movements 0  














- Medications


Medications: 


 Current Medications





Acetaminophen (Tylenol 650mg/20.3ml Solution Ud)  650 mg PO Q6 PRN


   PRN Reason: Fever


   Last Admin: 12/13/15 20:07 Dose:  650 mg


Acetaminophen (Tylenol 650 Mg Supp)  650 mg [place] Q6 PRN


   PRN Reason: fevers


   Last Admin: 12/14/15 06:53 Dose:  650 mg


Aspirin (Ecotrin)  81 mg PO DAILY [facility]


   Last Admin: 12/14/15 08:23 Dose:  Not Given


Enalapril Maleate (Vasotec)  10 mg PO DAILY [facility]


   Last Admin: 12/14/15 08:25 Dose:  Not Given


Famotidine (Pepcid)  20 mg GT BID [facility]


   Last Admin: 12/14/15 16:39 Dose:  20 mg


Vancomycin HCl 1 gm/ Sodium (Chloride)  250 mls @ 125 mls/hr IVPB Q12 [facility]


   Last Admin: 12/14/15 08:24 Dose:  125 mls/hr


Ceftriaxone Sodium 2 gm/ (Sodium Chloride)  100 mls @ 100 mls/hr IVPB DAILY [facility]


   Last Admin: 12/14/15 08:24 Dose:  100 mls/hr


Insulin Human Regular (Humulin R)  0 units SC ACHS MAGDALENA


   PRN Reason: Protocol


   Last Admin: 12/14/15 16:38 Dose:  Not Given


Lorazepam (Ativan)  2 mg IVP Q6 PRN


   PRN Reason: Agitation


   Last Admin: 12/14/15 13:05 Dose:  2 mg


Morphine Sulfate (Morphine)  4 mg IVP Q2H PRN


   PRN Reason: Pain, moderate (4-7)


   Last Admin: 12/14/15 02:33 Dose:  4 mg


Multivitamins/Vitamin C (Multi-Delyn Liquid)  5 ml PO DAILY [facility]


   Last Admin: 12/14/15 08:23 Dose:  Not Given


Nitroglycerin (Nitro-Bid 2% Oint)  2 in TOP Q6 [facility]


   Last Admin: 12/14/15 16:38 Dose:  2 in











- Labs


Labs (last 24 hours): 


 Laboratory Results - last 24 hr











  12/13/15 12/13/15 12/14/15





  21:31 21:47 05:20


 


WBC    8.2


 


RBC    2.94 L


 


Hgb    9.8 L


 


Hct    31.1 L


 


MCV    105.7 H


 


MCH    33.3 H


 


MCHC    31.5 L


 


RDW    13.7


 


Plt Count    207


 


MPV    7.8


 


Neut % (Auto)    66.1


 


Lymph % (Auto)    15.0 L


 


Mono % (Auto)    16.0 H


 


Eos % (Auto)    2.5


 


Baso % (Auto)    0.4


 


Neut #    5.4


 


Lymph #    1.2


 


Mono #    1.3 H


 


Eos #    0.2


 


Baso #    0.0


 


PT    11.2


 


INR    1.05


 


APTT    36.2 H


 


pCO2  49 H  


 


pO2  127 H  


 


HCO3  30.5 H  


 


ABG pH  7.43  


 


ABG Total CO2  34.0 H  


 


ABG O2 Saturation  98.6 H  


 


ABG O2 Content  15.0  


 


ABG Base Excess  7.1 H  


 


ABG Hemoglobin  10.9 L  


 


ABG Carboxyhemoglobin  1.6 H  


 


POC ABG HHb (Measured)  1.4  


 


ABG Methemoglobin  0.7  


 


ABG O2 Capacity  15.2 L  


 


Chu Test  Yes  


 


A-a O2 Difference  240.0  


 


Hgb O2 Saturation  96.3  


 


FiO2  60.0  


 


Sodium    147


 


Potassium    3.2 L


 


Chloride    110 H


 


Carbon Dioxide    28


 


Anion Gap    12


 


BUN    13


 


Creatinine    1.1


 


Est GFR ( Amer)    > 60


 


Est GFR (Non-Af Amer)    > 60


 


POC Glucose (mg/dL)   100 


 


Random Glucose    88


 


Calcium    8.5


 


Total Bilirubin    0.5


 


AST    29


 


ALT    30


 


Alkaline Phosphatase    86


 


Total Protein    6.3


 


Albumin    2.9 L


 


Globulin    3.4


 


Albumin/Globulin Ratio    0.9 L


 


Random Vancomycin    9.0


 


Blood Type    O POSITIVE


 


Blood Type Confirm   


 


Antibody Screen    Negative


 


BBK History Checked    No verified bt














  12/14/15 12/14/15 12/14/15





  05:21 06:02 07:45


 


WBC   


 


RBC   


 


Hgb   


 


Hct   


 


MCV   


 


MCH   


 


MCHC   


 


RDW   


 


Plt Count   


 


MPV   


 


Neut % (Auto)   


 


Lymph % (Auto)   


 


Mono % (Auto)   


 


Eos % (Auto)   


 


Baso % (Auto)   


 


Neut #   


 


Lymph #   


 


Mono #   


 


Eos #   


 


Baso #   


 


PT   


 


INR   


 


APTT   


 


pCO2  44  


 


pO2  91  


 


HCO3  30.1 H  


 


ABG pH  7.46 H  


 


ABG Total CO2  32.7 H  


 


ABG O2 Saturation  97.8  


 


ABG O2 Content  14.1 L  


 


ABG Base Excess  6.7 H  


 


ABG Hemoglobin  10.4 L  


 


ABG Carboxyhemoglobin  1.5  


 


POC ABG HHb (Measured)  2.2  


 


ABG Methemoglobin  0.8  


 


ABG O2 Capacity  14.4 L  


 


Chu Test  Yes  


 


A-a O2 Difference  211.0  


 


Hgb O2 Saturation  95.6  


 


FiO2  50.0  


 


Sodium   


 


Potassium   


 


Chloride   


 


Carbon Dioxide   


 


Anion Gap   


 


BUN   


 


Creatinine   


 


Est GFR ( Amer)   


 


Est GFR (Non-Af Amer)   


 


POC Glucose (mg/dL)   78 


 


Random Glucose   


 


Calcium   


 


Total Bilirubin   


 


AST   


 


ALT   


 


Alkaline Phosphatase   


 


Total Protein   


 


Albumin   


 


Globulin   


 


Albumin/Globulin Ratio   


 


Random Vancomycin   


 


Blood Type   


 


Blood Type Confirm    O POSITIVE


 


Antibody Screen   


 


BBK History Checked   














  12/14/15 12/14/15





  11:23 16:39


 


WBC  


 


RBC  


 


Hgb  


 


Hct  


 


MCV  


 


MCH  


 


MCHC  


 


RDW  


 


Plt Count  


 


MPV  


 


Neut % (Auto)  


 


Lymph % (Auto)  


 


Mono % (Auto)  


 


Eos % (Auto)  


 


Baso % (Auto)  


 


Neut #  


 


Lymph #  


 


Mono #  


 


Eos #  


 


Baso #  


 


PT  


 


INR  


 


APTT  


 


pCO2  


 


pO2  


 


HCO3  


 


ABG pH  


 


ABG Total CO2  


 


ABG O2 Saturation  


 


ABG O2 Content  


 


ABG Base Excess  


 


ABG Hemoglobin  


 


ABG Carboxyhemoglobin  


 


POC ABG HHb (Measured)  


 


ABG Methemoglobin  


 


ABG O2 Capacity  


 


Chu Test  


 


A-a O2 Difference  


 


Hgb O2 Saturation  


 


FiO2  


 


Sodium  


 


Potassium  


 


Chloride  


 


Carbon Dioxide  


 


Anion Gap  


 


BUN  


 


Creatinine  


 


Est GFR ( Amer)  


 


Est GFR (Non-Af Amer)  


 


POC Glucose (mg/dL)  61 L  93


 


Random Glucose  


 


Calcium  


 


Total Bilirubin  


 


AST  


 


ALT  


 


Alkaline Phosphatase  


 


Total Protein  


 


Albumin  


 


Globulin  


 


Albumin/Globulin Ratio  


 


Random Vancomycin  


 


Blood Type  


 


Blood Type Confirm  


 


Antibody Screen  


 


BBK History Checked  














- Constitutional


Appears: Toxic





- Head Exam


Head Exam: NORMAL INSPECTION





- Eye Exam


Eye Exam: Normal appearance





- ENT Exam


ENT Exam: Mucous Membranes Moist





- Neck Exam


Neck exam: absent: Thyromegaly





- Respiratory Exam


Respiratory Exam: Decreased Breath Sounds





- Cardiovascular Exam


Cardiovascular Exam: REGULAR RHYTHM





- GI/Abdominal Exam


GI & Abdominal Exam: Normal Bowel Sounds





- Rectal Exam


Rectal Exam: Deferred





- Extremities Exam


Extremities exam: pedal edema





- Skin


Skin Exam: Normal Color





Assessment/Plan


(1) Cardiac arrest


Assessment and plan: 


unclear etiology.  Recommend serial cardiac enzymes.  Check troponin.  check 

echocardiogram. 





Patient has no current mental status.  I advise conservative medical therapy. 

No plans for cardiac cath.








Current Visit: Yes   Status: Acute





(2) NSTEMI (non-ST elevated myocardial infarction)


Assessment and plan: 


Patient may have underlying CAD as a cause of his acute event and cardiac 

arrest.  





Conservative therapy.


Current Visit: Yes   Status: Acute

## 2015-12-14 NOTE — PN
DATE: 12/14/2015



The patient in ICU, bed 434.



TIME SPENT:  35 minutes.



Events since admission reviewed.



A 55-year-old male, status post cardiac arrest with arrhythmia, noted to be 
ventricular tachycardia, remains intubated on mechanical ventilation.  PRVC 16, 
tidal volume 600, FiO2 of 50%, pulse oximetry 98%, respiratory rate 16 , tidal 
volume 600 , minute, ventilation 8.9 liters, saturating 98%.  Peak airway 
pressure of 44, mean airway pressure 18, and tidal CO2 of 17.  Overnight, self-
extubated, reintubated, and post-intubation, reintubation x-ray with adequate 
replacement of the endotracheal tube, remains sedated on Diprivan drip  No 
motor  response is reported.,off sedation



CURRENT VITAL SIGNS:  Temperature is 100.4, heart rate 89, respiratory rate 16, 
blood pressure 139/89, pulse oximetry 100%.  Intake 3567, output of 1445, 
positive 2122.

HEAD, EYES, EARS, NOSE, AND THROAT:  Pupils are reactive to light, sluggish.  
Conjunctivae are pink.  Sclerae are white.

NECK:  Supple.  Endotracheal tube without secretions.  Trachea is central.

CHEST:  Bilateral breath sounds diminished in intensity.  Clear to auscultation 
anteriorly and laterally.

HEART:  Rhythm regular.  S1, S2 normal intensity.  No audible murmur or rub.

ABDOMEN:  Bowel sounds are present, soft.  NG tube in place, feeding in 
progress.

EXTREMITIES:  With 1-2+ edema.

NEUROLOGIC:  Sedated on Diprivan drip.  No response as noted above.



CURRENT MEDICATIONS:  Tylenol 650 q. 6 p.r.n., Ecotrin 81 mg p.o. daily, 
ceftriaxone 2 grams IV daily, enalapril 10 mg NG daily, Pepcid 20 mg b.i.d., 
multivitamin  5 mL p.o. daily, nitroglycerin b.i.d. 2% ointment q. 6 hours, 
potassium chloride as supplement, propofol infu, vancomycin 1 gram IV q. 12.



LABORATORY DATA:  WBC _____, hemoglobin 9.8, hematocrit 31.1, platelet count 
207.  PT 11.2, INR 1.05, PTT 36.2.  ABG:  pH 7.46, pCO2 of 44, pO2 of 91, 
saturating 97.8 on FiO2 of 50%.  SMA-7:  Sodium 147, potassium 3.2, chloride 110
, CO2 of 28, BUN 13, creatinine 1.1.  Total bilirubin 0.5.  AST 29, ALT 30, 
alkaline phosphatase 86, total protein 6.3, albumin 2.9, folate pending.



Microbiology bronchial culture:  Klebsiella pneumoniae.



Chest x-ray:  Stable diffuse opacities throughout both lungs, appropriate 
positioned endotracheal tube.



ASSESSMENT:  Cardiac arrest with resuscitation from ventricular tachycardia, 
anoxic encephalopathy, possible aspiration pneumonia. positive for Klebsiella 
pneumoniae.  Homeless.  As per social service, no next-of-kin available to 
consent for any procedure for any decision making.  The patient is scheduled 
for a tracheostomy.  As patient may remain on the vent for a long period of time
, would consider doing the tracheostomy, and consent has been requested, and we 
will do so.





__________________________________________

Samuel Courtney MD







cc:   



DD: 12/14/2015 11:16:18  170

TT: 12/14/2015 11:58:38

Job # [id number]

jn

MTDD

## 2015-12-14 NOTE — RAD
HISTORY:

f/u aspiration pna  



COMPARISON:

Multiple prior studies 



FINDINGS:

There is an endotracheal tube with its tip approximately 3 

centimeters above the bifurcation. 



There is stable diffuse opacification of the left hemithorax. There 

is stable diffuse opacification in the right middle lobe and portions 

of the right upper lobe. The heart appears stable. 



IMPRESSION:

Stable diffuse opacities throughout both lungs. Radiographically 

appropriate positioned ETT.

## 2015-12-14 NOTE — CP.PCM.PN
Subjective





- Subjective


Subjective: 


KLEBS IN URINE SPUTUM 


IV RX IN PROGRESS


FEVER ON AND OFF


CIPRO ADDED 





Review of Systems





- Review of Systems


All systems: reviewed and no additional remarkable complaints except





- Constitutional


Constitutional: absent: Fever





Objective





- Vital Signs/Intake and Output


Vital Signs (last 24 hours): 


 Vital Signs - 24 hr











  12/13/15 12/13/15 12/13/15





  12:00 14:00 16:00


 


Temperature 100.9 F H  100.8 F H


 


Pulse Rate 100 H 107 H 103 H


 


Respiratory 16 18 16





Rate   


 


Blood Pressure 127/71 124/73 119/70


 


O2 Sat by Pulse 100 99 99





Oximetry   














  12/13/15 12/13/15 12/13/15





  18:00 19:00 20:00


 


Temperature   101.1 F H


 


Pulse Rate 107 H 107 H 110 H


 


Respiratory 15 15 14





Rate   


 


Blood Pressure 125/81 130/82 159/85 H


 


O2 Sat by Pulse 100 100 100





Oximetry   














  12/13/15 12/13/15 12/13/15





  21:00 22:00 23:00


 


Temperature   


 


Pulse Rate 122 H 108 H 101 H


 


Respiratory 20 16 16





Rate   


 


Blood Pressure 126/71 130/73 143/80


 


O2 Sat by Pulse 96 99 100





Oximetry   














  12/14/15 12/14/15 12/14/15





  00:00 01:00 02:00


 


Temperature 101.3 F H  


 


Pulse Rate 108 H 111 H 107 H


 


Respiratory 16 23 17





Rate   


 


Blood Pressure 139/84 120/83 138/80


 


O2 Sat by Pulse 99 96 99





Oximetry   














  12/14/15 12/14/15 12/14/15





  03:00 04:00 05:00


 


Temperature  101.3 F H 


 


Pulse Rate 107 H 99 H 105 H


 


Respiratory 16 16 16





Rate   


 


Blood Pressure 134/78 137/80 153/107 H


 


O2 Sat by Pulse 98 98 98





Oximetry   














  12/14/15 12/14/15 12/14/15





  06:00 07:00 08:00


 


Temperature   101.4 F H


 


Pulse Rate 108 H 108 H 95 H


 


Respiratory 17 17 16





Rate   


 


Blood Pressure 158/94 H 153/95 H 141/78


 


O2 Sat by Pulse 98 97 98





Oximetry   














  12/14/15 12/14/15





  09:00 10:00


 


Temperature 101.2 F H 100.4 F H


 


Pulse Rate 94 H 89


 


Respiratory 16 16





Rate  


 


Blood Pressure 134/85 138/89


 


O2 Sat by Pulse 98 100





Oximetry  











Intake and Output (last 12 hours): 


 Intake & Output











 12/13/15 12/14/15 12/14/15





 18:59 06:59 18:59


 


Intake Total 1977 1590 663


 


Output Total 600 845 


 


Balance 1377 745 663


 


Intake:   


 


  IV 1027 1020 313


 


  Intake, Piggyback 350 250 350


 


  Oral   0


 


  Tube Feeding 600 220 


 


  Free Water Flush  100 


 


Output:   


 


  Urine 600 845 


 


    Urethral (Lua) 600 845 


 


Other:   


 


  # Bowel Movements 2 0 














- Medications


Medications: 


 Current Medications





Acetaminophen (Tylenol 650mg/20.3ml Solution Ud)  650 mg PO Q6 PRN


   PRN Reason: Fever


   Last Admin: 12/13/15 20:07 Dose:  650 mg


Acetaminophen (Tylenol 650 Mg Supp)  650 mg CO Q6 PRN


   PRN Reason: fevers


   Last Admin: 12/14/15 06:53 Dose:  650 mg


Aspirin (Ecotrin)  81 mg PO DAILY [facility]


   Last Admin: 12/14/15 08:23 Dose:  Not Given


Enalapril Maleate (Vasotec)  10 mg PO DAILY [facility]


   Last Admin: 12/14/15 08:25 Dose:  Not Given


Famotidine (Pepcid)  20 mg GT BID [facility]


   Last Admin: 12/14/15 08:23 Dose:  Not Given


Vancomycin HCl 1 gm/ Sodium (Chloride)  250 mls @ 125 mls/hr IVPB Q12 [facility]


   Last Admin: 12/14/15 08:24 Dose:  125 mls/hr


Ceftriaxone Sodium 2 gm/ (Sodium Chloride)  100 mls @ 100 mls/hr IVPB DAILY [facility]


   Last Admin: 12/14/15 08:24 Dose:  100 mls/hr


Propofol (Diprivan)  100 mls @ 2.041 mls/hr IV .Q24H MAGDALENA; 5 MCG/KG/MIN


   PRN Reason: Protocol


   Stop: 12/14/15 13:28


   Last Admin: 12/14/15 05:55 Dose:  2.041 mls/hr


Potassium Chloride (Potassium Cl 10meq/50ml Sterile Water)  50 mls @ 50 mls/hr 

IVPB Q1 [facility]


   Stop: 12/14/15 12:59


   Last Admin: 12/14/15 09:17 Dose:  50 mls/hr


Insulin Human Regular (Humulin R)  0 units SC ACHS [facility]


   PRN Reason: Protocol


   Last Admin: 12/14/15 06:46 Dose:  Not Given


Lorazepam (Ativan)  2 mg IVP Q6 PRN


   PRN Reason: Agitation


   Last Admin: 12/13/15 20:07 Dose:  2 mg


Morphine Sulfate (Morphine)  4 mg IVP Q2H PRN


   PRN Reason: Pain, moderate (4-7)


   Last Admin: 12/14/15 02:33 Dose:  4 mg


Multivitamins/Vitamin C (Multi-Delyn Liquid)  5 ml PO DAILY [facility]


   Last Admin: 12/14/15 08:23 Dose:  Not Given


Nitroglycerin (Nitro-Bid 2% Oint)  2 in TOP Q6 [facility]


   Last Admin: 12/14/15 09:18 Dose:  2 in











- Labs


Labs (last 24 hours): 


 Laboratory Results - last 24 hr











  12/13/15 12/13/15 12/13/15





  12:10 16:09 21:31


 


WBC   


 


RBC   


 


Hgb   


 


Hct   


 


MCV   


 


MCH   


 


MCHC   


 


RDW   


 


Plt Count   


 


MPV   


 


Neut % (Auto)   


 


Lymph % (Auto)   


 


Mono % (Auto)   


 


Eos % (Auto)   


 


Baso % (Auto)   


 


Neut #   


 


Lymph #   


 


Mono #   


 


Eos #   


 


Baso #   


 


PT   


 


INR   


 


APTT   


 


pCO2    49 H


 


pO2    127 H


 


HCO3    30.5 H


 


ABG pH    7.43


 


ABG Total CO2    34.0 H


 


ABG O2 Saturation    98.6 H


 


ABG O2 Content    15.0


 


ABG Base Excess    7.1 H


 


ABG Hemoglobin    10.9 L


 


ABG Carboxyhemoglobin    1.6 H


 


POC ABG HHb (Measured)    1.4


 


ABG Methemoglobin    0.7


 


ABG O2 Capacity    15.2 L


 


Chu Test    Yes


 


A-a O2 Difference    240.0


 


Hgb O2 Saturation    96.3


 


FiO2    60.0


 


Sodium   


 


Potassium   


 


Chloride   


 


Carbon Dioxide   


 


Anion Gap   


 


BUN   


 


Creatinine   


 


Est GFR ( Amer)   


 


Est GFR (Non-Af Amer)   


 


POC Glucose (mg/dL)  104  108 


 


Random Glucose   


 


Calcium   


 


Total Bilirubin   


 


AST   


 


ALT   


 


Alkaline Phosphatase   


 


Total Protein   


 


Albumin   


 


Globulin   


 


Albumin/Globulin Ratio   


 


Random Vancomycin   


 


Blood Type   


 


Blood Type Confirm   


 


Antibody Screen   


 


BBK History Checked   














  12/13/15 12/14/15 12/14/15





  21:47 05:20 05:21


 


WBC   8.2 


 


RBC   2.94 L 


 


Hgb   9.8 L 


 


Hct   31.1 L 


 


MCV   105.7 H 


 


MCH   33.3 H 


 


MCHC   31.5 L 


 


RDW   13.7 


 


Plt Count   207 


 


MPV   7.8 


 


Neut % (Auto)   66.1 


 


Lymph % (Auto)   15.0 L 


 


Mono % (Auto)   16.0 H 


 


Eos % (Auto)   2.5 


 


Baso % (Auto)   0.4 


 


Neut #   5.4 


 


Lymph #   1.2 


 


Mono #   1.3 H 


 


Eos #   0.2 


 


Baso #   0.0 


 


PT   11.2 


 


INR   1.05 


 


APTT   36.2 H 


 


pCO2    44


 


pO2    91


 


HCO3    30.1 H


 


ABG pH    7.46 H


 


ABG Total CO2    32.7 H


 


ABG O2 Saturation    97.8


 


ABG O2 Content    14.1 L


 


ABG Base Excess    6.7 H


 


ABG Hemoglobin    10.4 L


 


ABG Carboxyhemoglobin    1.5


 


POC ABG HHb (Measured)    2.2


 


ABG Methemoglobin    0.8


 


ABG O2 Capacity    14.4 L


 


Chu Test    Yes


 


A-a O2 Difference    211.0


 


Hgb O2 Saturation    95.6


 


FiO2    50.0


 


Sodium   147 


 


Potassium   3.2 L 


 


Chloride   110 H 


 


Carbon Dioxide   28 


 


Anion Gap   12 


 


BUN   13 


 


Creatinine   1.1 


 


Est GFR ( Amer)   > 60 


 


Est GFR (Non-Af Amer)   > 60 


 


POC Glucose (mg/dL)  100  


 


Random Glucose   88 


 


Calcium   8.5 


 


Total Bilirubin   0.5 


 


AST   29 


 


ALT   30 


 


Alkaline Phosphatase   86 


 


Total Protein   6.3 


 


Albumin   2.9 L 


 


Globulin   3.4 


 


Albumin/Globulin Ratio   0.9 L 


 


Random Vancomycin   9.0 


 


Blood Type   O POSITIVE 


 


Blood Type Confirm   


 


Antibody Screen   Negative 


 


BBK History Checked   No verified bt 














  12/14/15 12/14/15





  06:02 07:45


 


WBC  


 


RBC  


 


Hgb  


 


Hct  


 


MCV  


 


MCH  


 


MCHC  


 


RDW  


 


Plt Count  


 


MPV  


 


Neut % (Auto)  


 


Lymph % (Auto)  


 


Mono % (Auto)  


 


Eos % (Auto)  


 


Baso % (Auto)  


 


Neut #  


 


Lymph #  


 


Mono #  


 


Eos #  


 


Baso #  


 


PT  


 


INR  


 


APTT  


 


pCO2  


 


pO2  


 


HCO3  


 


ABG pH  


 


ABG Total CO2  


 


ABG O2 Saturation  


 


ABG O2 Content  


 


ABG Base Excess  


 


ABG Hemoglobin  


 


ABG Carboxyhemoglobin  


 


POC ABG HHb (Measured)  


 


ABG Methemoglobin  


 


ABG O2 Capacity  


 


Chu Test  


 


A-a O2 Difference  


 


Hgb O2 Saturation  


 


FiO2  


 


Sodium  


 


Potassium  


 


Chloride  


 


Carbon Dioxide  


 


Anion Gap  


 


BUN  


 


Creatinine  


 


Est GFR ( Amer)  


 


Est GFR (Non-Af Amer)  


 


POC Glucose (mg/dL)  78 


 


Random Glucose  


 


Calcium  


 


Total Bilirubin  


 


AST  


 


ALT  


 


Alkaline Phosphatase  


 


Total Protein  


 


Albumin  


 


Globulin  


 


Albumin/Globulin Ratio  


 


Random Vancomycin  


 


Blood Type  


 


Blood Type Confirm   O POSITIVE


 


Antibody Screen  


 


BBK History Checked  














- Constitutional


Appears: Confused, Cachectic, Chronically Ill





- Head Exam


Head Exam: ATRAUMATIC, NORMAL INSPECTION, NORMOCEPHALIC





- Eye Exam


Eye Exam: PERRL.  absent: Scleral icterus





- ENT Exam


ENT Exam: Mucous Membranes Dry, Normal External Ear Exam





- Neck Exam


Neck exam: absent: Lymphadenopathy, Thyromegaly





- Respiratory Exam


Respiratory Exam: Decreased Breath Sounds, Rhonchi





- Cardiovascular Exam


Cardiovascular Exam: REGULAR RHYTHM, +S1, +S2





- GI/Abdominal Exam


GI & Abdominal Exam: Diminished Bowel Sounds, Distended, Soft.  absent: Firm, 

Guarding, Rebound, Tenderness





- Rectal Exam


Rectal Exam: Deferred





-  Exam


 Exam: NORMAL INSPECTION





- Extremities Exam


Extremities exam: pedal pulses present.  absent: pedal edema, tenderness





- Back Exam


Back exam: absent: CVA tenderness (L), CVA tenderness (R), paraspinal tenderness





- Neurological Exam


Neurological exam: Altered





- Psychiatric Exam


Psychiatric exam: Depressed





- Skin


Skin Exam: Dry, Intact





Assessment/Plan


(1) Agitation


Current Visit: Yes   Status: Acute


Comment: cont propofol for agitation


unsure if this is lower brainstem activity vs seizure activity vs other etiology








(2) Aspiration pneumonia


Current Visit: Yes   Status: Acute


Comment: worsened cxr, cont anbx, ID condult, trach placement








(3) Cardiac arrest


Current Visit: Yes   Status: Acute


Comment: s/p acls proceudres, ST w/ periods of SB


echo


cardio consult


pt remains on propofol for agitation


hr remains elevated unsure if this is related to fever, agitation or other 

etiology


cath when stable as per cardio


s/p cooling protocol


no cardiac intervention planned at present








(4) Diabetes mellitus with hyperglycemia


Current Visit: Yes   Status: Acute


Comment: riss per protocol








(5) History of seizure


Current Visit: Yes   Status: Acute


Comment: neuro consult








(6) NSTEMI (non-ST elevated myocardial infarction)


Current Visit: Yes   Status: Acute

## 2015-12-15 LAB
ARTERIAL BLOOD GAS HEMOGLOBIN: 11.9 G/DL (ref 11.7–17.4)
ARTERIAL BLOOD GAS O2 SAT: 98.3 % (ref 95–98)
ARTERIAL BLOOD GAS PCO2: 42 MM/HG (ref 35–45)
ARTERIAL BLOOD GAS TCO2: 32.6 MMOL/L (ref 22–28)
ARTERIAL PATENCY WRIST A: YES
BUN SERPL-MCNC: 13 MG/DL (ref 9–20)
CALCIUM SERPL-MCNC: 9.1 MG/DL (ref 8.4–10.2)
ERYTHROCYTE [DISTWIDTH] IN BLOOD BY AUTOMATED COUNT: 13.9 % (ref 11.5–14.5)
GFR NON-AFRICAN AMERICAN: > 60
HCO3 BLDA-SCNC: 30.4 MMOL/L (ref 21–28)
HGB BLD-MCNC: 11.4 G/DL (ref 12–18)
INHALED O2 CONCENTRATION: 50 %
MCH RBC QN AUTO: 33 PG (ref 27–31)
MCHC RBC AUTO-ENTMCNC: 31.3 G/DL (ref 33–37)
MCV RBC AUTO: 105.4 FL (ref 80–94)
O2 CAP BLDA-SCNC: 16.5 ML/DL (ref 16–24)
O2 CT BLDA-SCNC: 16.2 ML/DL (ref 15–23)
PH BLDA: 7.48 [PH] (ref 7.35–7.45)
PLATELET # BLD: 270 K/UL (ref 130–400)
PO2 BLDA: 110 MM/HG (ref 80–100)
RBC # BLD AUTO: 3.45 MIL/UL (ref 4.4–5.9)
WBC # BLD AUTO: 9.9 K/UL (ref 4.8–10.8)

## 2015-12-15 RX ADMIN — NITROGLYCERIN SCH IN: 20 OINTMENT TOPICAL at 21:21

## 2015-12-15 RX ADMIN — Medication SCH ML: at 08:46

## 2015-12-15 RX ADMIN — NITROGLYCERIN SCH IN: 20 OINTMENT TOPICAL at 04:00

## 2015-12-15 RX ADMIN — NITROGLYCERIN SCH IN: 20 OINTMENT TOPICAL at 16:31

## 2015-12-15 RX ADMIN — ENOXAPARIN SODIUM SCH MG: 40 INJECTION SUBCUTANEOUS at 13:30

## 2015-12-15 RX ADMIN — NITROGLYCERIN SCH IN: 20 OINTMENT TOPICAL at 10:00

## 2015-12-15 NOTE — PN
DATE: 12/15/2015



The patient in ICU, bed 434.



TIME SPENT:  35 minutes.



Overnight, afebrile, normotensive.  Telemetry sinus rhythm.  Intubated on mechanical ventilation.  Se
dated on Diprivan drip.  AC/PRVC 16, tidal volume 600, FIO2 50%, PEEP 5, peak observed rate 16, tidal
 volume 600, peak airway pressure in the low 30s, minute ventilation 9.6 liters.



PHYSICAL EXAMINATION:

CURRENT VITAL SIGNS:  Temperature 99.8, heart rate 87, blood pressure 140/77, respiratory rate 16, ox
ygen saturation 97%.  Intake 4300 mL, output 2475, balance 1528.  Urine output 1975.

HEENT:  Pupils reactive to light, sluggish.  Conjunctivae pink.  Sclerae white.

NECK:  Supple.  Endotracheal tube in place.  No secretions noted.  Trachea central.

CHEST:  Bilateral breath sounds, diminished in intensity, clear to auscultation.

HEART:  Rhythm regular.  S1, S2 normal intensity.  No audible murmur.

ABDOMEN:  Bowel sounds present, soft.

EXTREMITIES:  1+ edema.

NEUROLOGIC:  Sedated on Diprivan drip.  The patient reportedly agitated off sedation.  Moving his nec
k and moving the head and extremities.



LABORATORY DATA:  WBC 9.9, hemoglobin 11.4, hematocrit 36.3, platelet count 270.  PT 11.2, INR 1.05, 
PTT 36.2.  Blood gas, ABG:  pH 7.48, pCO2 42, pO2 110, oxygen saturation 98.3% on FIO2 50%, AC 16/600
/50%.  SMA-7:  Sodium 149, potassium 3.5, chloride of 110, CO2 29, BUN 13, creatinine 0.9, glucose 11
6, calcium 9.1.  Urinalysis:  WBCs 5-10, RBCs 0-2.



CURRENT MEDICATIONS:  Tylenol 650 q. 6 p.r.n., Ecotrin 81 mg p.o. daily, ceftriaxone 2 gram IV daily,
 enalapril 10 mg p.o. daily, Lovenox 40 subQ daily, Pepcid 20 mg b.i.d., lorazepam 2 mg IV q. 6 p.r.n
., propofol drip maintain to comfort care and to facilitate mechanical ventilation, vancomycin 1 gram
 IV q. 12.



Sputum culture positive for Klebsiella pneumoniae.



IMPRESSION:

1.  Status post cardiac arrest with resuscitation from ventricular tachycardia.

2.  Anoxic encephalopathy possible.

3.  Aspiration pneumonia.  Sputum culture positive for Klebsiella pneumoniae.

4.  Homeless.  Social service has tried to reach next of kin.  Nobody is reported to be available.



Tracheostomy and PEG insertion is needed, but on hold.  We will continue with current medications inc
luding bronchodilators, antibiotics.  Deep vein thrombosis, gastrointestinal prophylaxis.  Appreciate
 infectious disease and gastroenterology followup.





__________________________________________

Samuel Courtney MD







cc:



DD: 12/15/2015 12:52:20  170

TT: 12/15/2015 13:24:37

Job # [id number]

en

## 2015-12-15 NOTE — RAD
HISTORY:

vented  



COMPARISON:

No prior. 



FINDINGS:

The heart appears enlarged. 



There is an endotracheal tube with its tip below thoracic inlet.  

There is an enteric tube with its tip below the diaphragm.  There are 

diffuse consolidations. The lungs appear well inflated. 



IMPRESSION:

Radiographically appropriate positioned ETT.  Diffuse consolidations

## 2015-12-15 NOTE — CP.PCM.PN
Subjective





- Subjective


Subjective: 


pt remains sedated in bed w/o distress. pt just rec'd propofol bolus for 

agitation.  afebrile today. bp noted to be slightly elevated-?? r/t agitation.  


pending next of kin for decision making.





Review of Systems





- Review of Systems


Systems not reviewed;Unavailable: Intubated





- Constitutional


Constitutional: Fever





- EENT


Eyes: UNREMARKABLE


Ears: UNREMARKABLE


Nose/Mouth/Throat: UNREMARKABLE





- Cardiovascular


Cardiovascular: As Per HPI, Rapid Heart Rate





- Respiratory


Respiratory: As Per HPI, Excessive Mucous Production





- Gastrointestinal


Gastrointestinal: UNREMARKABLE





- Genitourinary


Genitourinary: UNREMARKABLE





- Reproductive: Male


Reproductive:Male: UNREMARKABLE





- Musculoskeletal


Musculoskeletal: UNREMARKABLE





- Integumentary


Integumentary: UNREMARKABLE





- Neurological


Neurological: UNREMARKABLE





- Psychiatric


Psychiatric: UNREMARKABLE





- Endocrine


Endocrine: UNREMARKABLE





- Hematologic/Lymphatic


Hematologic: UNREMARKABLE





Objective





- Vital Signs/Intake and Output


Vital Signs (last 24 hours): 


 Vital Signs - 24 hr











  12/14/15 12/14/15 12/14/15





  09:00 10:00 11:00


 


Temperature 101.2 F H 100.4 F H 100.4 F H


 


Pulse Rate 94 H 89 93 H


 


Respiratory 16 16 16





Rate   


 


Blood Pressure 134/85 138/89 150/96 H


 


O2 Sat by Pulse 98 100 100





Oximetry   














  12/14/15 12/14/15 12/14/15





  12:00 13:00 14:00


 


Temperature 100.2 F H 99.9 F H 99.9 F H


 


Pulse Rate 98 H 92 H 85


 


Respiratory 17 16 16





Rate   


 


Blood Pressure  159/102 H 130/83


 


O2 Sat by Pulse 100 99 100





Oximetry   














  12/14/15 12/14/15 12/14/15





  15:00 16:00 17:00


 


Temperature 99 F 99 F 99.7 F H


 


Pulse Rate 98 H 88 95 H


 


Respiratory 17 16 17





Rate   


 


Blood Pressure 152/83 H 126/78 130/83


 


O2 Sat by Pulse 99 100 100





Oximetry   














  12/14/15 12/14/15 12/14/15





  18:00 19:00 20:00


 


Temperature 100.4 F H 100.4 F H 100.4 F H


 


Pulse Rate 97 H 95 H 105 H


 


Respiratory 18 17 17





Rate   


 


Blood Pressure 168/117 H 156/98 H 154/89 H


 


O2 Sat by Pulse 100 100 99





Oximetry   














  12/14/15 12/14/15 12/14/15





  21:00 22:00 23:00


 


Temperature   


 


Pulse Rate 103 H 99 H 96 H


 


Respiratory 21 16 16





Rate   


 


Blood Pressure 141/94 H 144/90 145/92 H


 


O2 Sat by Pulse 98 99 98





Oximetry   














  12/15/15 12/15/15 12/15/15





  00:00 01:00 02:00


 


Temperature 100.8 F H  


 


Pulse Rate 98 H 96 H 92 H


 


Respiratory 16 18 17





Rate   


 


Blood Pressure 147/87 151/95 H 152/95 H


 


O2 Sat by Pulse 98 99 99





Oximetry   














  12/15/15 12/15/15 12/15/15





  03:00 04:00 05:00


 


Temperature  99.4 F 


 


Pulse Rate 92 H 104 H 93 H


 


Respiratory 16 21 18





Rate   


 


Blood Pressure 155/96 H 171/119 H 171/108 H


 


O2 Sat by Pulse 100 100 100





Oximetry   














  12/15/15 12/15/15





  06:00 07:00


 


Temperature  


 


Pulse Rate 91 H 91 H


 


Respiratory 18 16





Rate  


 


Blood Pressure 179/124 H 158/96 H


 


O2 Sat by Pulse 100 100





Oximetry  











Intake and Output (last 12 hours): 


 Intake & Output











 12/14/15 12/15/15 12/15/15





 18:59 06:59 18:59


 


Intake Total 1823 2180 


 


Output Total 1400 1075 


 


Balance 423 1105 


 


Intake:   


 


   1070 


 


  Intake, Piggyback 550 250 


 


  Oral 0  


 


  Tube Feeding 330 660 


 


  Free Water Flush 100 200 


 


Output:   


 


  Urine 900 1075 


 


    Urethral (Mcgee) 900 1075 


 


  Stool 500  


 


Other:   


 


  # Bowel Movements  1 














- Medications


Medications: 


 Current Medications





Acetaminophen (Tylenol 650mg/20.3ml Solution Ud)  650 mg PO Q6 PRN


   PRN Reason: Fever


   Last Admin: 12/13/15 20:07 Dose:  650 mg


Acetaminophen (Tylenol 650 Mg Supp)  650 mg OK Q6 PRN


   PRN Reason: fevers


   Last Admin: 12/14/15 06:53 Dose:  650 mg


Aspirin (Ecotrin)  81 mg PO DAILY [facility]


   Last Admin: 12/14/15 08:23 Dose:  Not Given


Enalapril Maleate (Vasotec)  10 mg PO DAILY [facility]


   Last Admin: 12/14/15 08:25 Dose:  Not Given


Famotidine (Pepcid)  20 mg GT BID [facility]


   Last Admin: 12/14/15 16:39 Dose:  20 mg


Vancomycin HCl 1 gm/ Sodium (Chloride)  250 mls @ 125 mls/hr IVPB Q12 [facility]


   Last Admin: 12/14/15 20:21 Dose:  125 mls/hr


Ceftriaxone Sodium 2 gm/ (Sodium Chloride)  100 mls @ 100 mls/hr IVPB DAILY [facility]


   Last Admin: 12/14/15 08:24 Dose:  100 mls/hr


Propofol (Diprivan)  100 mls @ 2.041 mls/hr IV .Q24H [facility]; 5 MCG/KG/MIN


   PRN Reason: Protocol


   Stop: 12/15/15 22:32


   Last Admin: 12/14/15 22:26 Dose:  2.041 mls/hr


Insulin Human Regular (Humulin R)  0 units SC ACHS [facility]


   PRN Reason: Protocol


   Last Admin: 12/15/15 07:20 Dose:  Not Given


Lorazepam (Ativan)  2 mg IVP Q6 PRN


   PRN Reason: Agitation


   Last Admin: 12/14/15 20:19 Dose:  2 mg


Morphine Sulfate (Morphine)  4 mg IVP Q2H PRN


   PRN Reason: Pain, moderate (4-7)


   Last Admin: 12/14/15 02:33 Dose:  4 mg


Multivitamins/Vitamin C (Multi-Delyn Liquid)  5 ml PO DAILY [facility]


   Last Admin: 12/14/15 08:23 Dose:  Not Given


Nitroglycerin (Nitro-Bid 2% Oint)  2 in TOP Q6 [facility]


   Last Admin: 12/15/15 04:00 Dose:  2 in











- Labs


Labs (last 24 hours): 


 Laboratory Results - last 24 hr











  12/14/15 12/14/15 12/14/15





  07:45 11:23 16:39


 


WBC   


 


RBC   


 


Hgb   


 


Hct   


 


MCV   


 


MCH   


 


MCHC   


 


RDW   


 


Plt Count   


 


pCO2   


 


pO2   


 


HCO3   


 


ABG pH   


 


ABG Total CO2   


 


ABG O2 Saturation   


 


ABG O2 Content   


 


ABG Base Excess   


 


ABG Hemoglobin   


 


ABG Carboxyhemoglobin   


 


POC ABG HHb (Measured)   


 


ABG Methemoglobin   


 


ABG O2 Capacity   


 


Chu Test   


 


A-a O2 Difference   


 


Hgb O2 Saturation   


 


FiO2   


 


Blood Gas Comments   


 


Crit Value Called To   


 


Crit Value Read Back   


 


Sodium   


 


Potassium   


 


Chloride   


 


Carbon Dioxide   


 


Anion Gap   


 


BUN   


 


Creatinine   


 


Est GFR ( Amer)   


 


Est GFR (Non-Af Amer)   


 


POC Glucose (mg/dL)   61 L  93


 


Random Glucose   


 


Calcium   


 


Blood Type Confirm  O POSITIVE  














  12/14/15 12/15/15 12/15/15





  21:23 04:00 05:12


 


WBC   9.9 


 


RBC   3.45 L 


 


Hgb   11.4 L 


 


Hct   36.3 


 


MCV   105.4 H 


 


MCH   33.0 H 


 


MCHC   31.3 L 


 


RDW   13.9 


 


Plt Count   270 


 


pCO2    42


 


pO2    110 H


 


HCO3    30.4 H


 


ABG pH    7.48 H


 


ABG Total CO2    32.6 H


 


ABG O2 Saturation    98.3 H


 


ABG O2 Content    16.2


 


ABG Base Excess    7.1 H


 


ABG Hemoglobin    11.9


 


ABG Carboxyhemoglobin    1.3


 


POC ABG HHb (Measured)    1.7


 


ABG Methemoglobin    1.2


 


ABG O2 Capacity    16.5


 


Chu Test    Yes


 


A-a O2 Difference    194.0


 


Hgb O2 Saturation    95.8


 


FiO2    50.0


 


Blood Gas Comments    Prv/ac/16/600/50%/+


 


Crit Value Called To    rn erll


 


Crit Value Read Back    N


 


Sodium   149 H 


 


Potassium   3.5 L 


 


Chloride   110 H 


 


Carbon Dioxide   29 


 


Anion Gap   14 


 


BUN   13 


 


Creatinine   0.9 


 


Est GFR ( Amer)   > 60 


 


Est GFR (Non-Af Amer)   > 60 


 


POC Glucose (mg/dL)  103  


 


Random Glucose   116 H 


 


Calcium   9.1 


 


Blood Type Confirm   














  12/15/15





  06:23


 


WBC 


 


RBC 


 


Hgb 


 


Hct 


 


MCV 


 


MCH 


 


MCHC 


 


RDW 


 


Plt Count 


 


pCO2 


 


pO2 


 


HCO3 


 


ABG pH 


 


ABG Total CO2 


 


ABG O2 Saturation 


 


ABG O2 Content 


 


ABG Base Excess 


 


ABG Hemoglobin 


 


ABG Carboxyhemoglobin 


 


POC ABG HHb (Measured) 


 


ABG Methemoglobin 


 


ABG O2 Capacity 


 


Chu Test 


 


A-a O2 Difference 


 


Hgb O2 Saturation 


 


FiO2 


 


Blood Gas Comments 


 


Crit Value Called To 


 


Crit Value Read Back 


 


Sodium 


 


Potassium 


 


Chloride 


 


Carbon Dioxide 


 


Anion Gap 


 


BUN 


 


Creatinine 


 


Est GFR ( Amer) 


 


Est GFR (Non-Af Amer) 


 


POC Glucose (mg/dL)  108


 


Random Glucose 


 


Calcium 


 


Blood Type Confirm 














- Constitutional


Appears: Non-toxic, No Acute Distress, Chronically Ill





- Head Exam


Head Exam: ATRAUMATIC, NORMAL INSPECTION, NORMOCEPHALIC





- Eye Exam


Eye Exam: EOMI





- Respiratory Exam


Respiratory Exam: Clear to PA & Lateral, NORMAL BREATHING PATTERN, UNREMARKABLE





- Cardiovascular Exam


Cardiovascular Exam: Tachycardia, REGULAR RHYTHM, RRR





- GI/Abdominal Exam


GI & Abdominal Exam: Normal Bowel Sounds, Soft, Unremarkable





- Extremities Exam


Extremities exam: normal capillary refill, normal inspection, pedal pulses 

present





- Back Exam


Back exam: FULL ROM





- Skin


Skin Exam: Dry, Intact, Normal Color, Warm





Assessment/Plan


(1) Cardiac arrest


Current Visit: Yes   Status: Acute


Comment: s/p acls proceudres, ST w/ periods of SB


echo


cardio consult


pt remains on propofol for agitation


hr remains elevated unsure if this is related to fever, agitation or other 

etiology


cath when stable as per cardio


s/p cooling protocol


no cardiac intervention planned at present








(2) DVT prophylaxis


Current Visit: Yes   Status: Acute


Comment: lovenox








(3) Scrotal edema


Current Visit: Yes   Status: Acute


Comment: scrotal us-noted


urology consult-dr cortes to see pt when stable








(4) History of seizure


Current Visit: Yes   Status: Acute


Comment: neuro consult


?? eeg








(5) Diabetes mellitus with hyperglycemia


Current Visit: Yes   Status: Acute


Comment: riss per protocol








(6) Agitation


Current Visit: Yes   Status: Acute


Comment: cont propofol for agitation


unsure if this is lower brainstem activity vs seizure activity vs other etiology








(7) UTI (urinary tract infection)


Current Visit: Yes   Status: Acute


Comment: cont anbx


mcgee cath


uro consult pending








(8) Aspiration pneumonia


Current Visit: Yes   Status: Acute


Comment: worsened cxr, cont anbx, ID condult, trach placement











- Assessment and Plan (Free Text)


Assessment: 


pending notification of next of kin for decision making

## 2015-12-15 NOTE — CP.PCM.PN
Subjective





- Subjective


Subjective: 


no overnight events





Objective





- Vital Signs/Intake and Output


Vital Signs (last 24 hours): 


 Vital Signs - 24 hr











  12/14/15 12/14/15 12/14/15





  13:00 14:00 15:00


 


Temperature 99.9 F H 99.9 F H 99 F


 


Pulse Rate 92 H 85 98 H


 


Respiratory 16 16 17





Rate   


 


Blood Pressure 159/102 H 130/83 152/83 H


 


O2 Sat by Pulse 99 100 99





Oximetry   














  12/14/15 12/14/15 12/14/15





  16:00 17:00 18:00


 


Temperature 99 F 99.7 F H 100.4 F H


 


Pulse Rate 88 95 H 97 H


 


Respiratory 16 17 18





Rate   


 


Blood Pressure 126/78 130/83 168/117 H


 


O2 Sat by Pulse 100 100 100





Oximetry   














  12/14/15 12/14/15 12/14/15





  19:00 20:00 21:00


 


Temperature 100.4 F H 100.4 F H 


 


Pulse Rate 95 H 105 H 103 H


 


Respiratory 17 17 21





Rate   


 


Blood Pressure 156/98 H 154/89 H 141/94 H


 


O2 Sat by Pulse 100 99 98





Oximetry   














  12/14/15 12/14/15 12/15/15





  22:00 23:00 00:00


 


Temperature   100.8 F H


 


Pulse Rate 99 H 96 H 98 H


 


Respiratory 16 16 16





Rate   


 


Blood Pressure 144/90 145/92 H 147/87


 


O2 Sat by Pulse 99 98 98





Oximetry   














  12/15/15 12/15/15 12/15/15





  01:00 02:00 03:00


 


Temperature   


 


Pulse Rate 96 H 92 H 92 H


 


Respiratory 18 17 16





Rate   


 


Blood Pressure 151/95 H 152/95 H 155/96 H


 


O2 Sat by Pulse 99 99 100





Oximetry   














  12/15/15 12/15/15 12/15/15





  04:00 05:00 06:00


 


Temperature 99.4 F  


 


Pulse Rate 104 H 93 H 91 H


 


Respiratory 21 18 18





Rate   


 


Blood Pressure 171/119 H 171/108 H 179/124 H


 


O2 Sat by Pulse 100 100 100





Oximetry   














  12/15/15 12/15/15 12/15/15





  07:00 08:00 09:00


 


Temperature  99.5 F 


 


Pulse Rate 91 H 87 86


 


Respiratory 16 16 16





Rate   


 


Blood Pressure 158/96 H 192/94 H 124/73


 


O2 Sat by Pulse 100 97 99





Oximetry   














  12/15/15 12/15/15





  10:00 11:00


 


Temperature 98.9 F 99.8 F H


 


Pulse Rate 87 87


 


Respiratory 16 16





Rate  


 


Blood Pressure 134/85 140/77


 


O2 Sat by Pulse 99 97





Oximetry  











Intake and Output (last 12 hours): 


 Intake & Output











 12/14/15 12/15/15 12/15/15





 18:59 06:59 18:59


 


Intake Total 1823 2180 865


 


Output Total 1400 1075 


 


Balance 423 1105 865


 


Intake:   


 


   1070 265


 


  Intake, Piggyback 550 250 350


 


  Oral 0  


 


  Tube Feeding 330 660 


 


  Free Water Flush 100 200 250


 


Output:   


 


  Urine 900 1075 


 


    Urethral (Lua) 900 1075 


 


  Stool 500  


 


Other:   


 


  # Bowel Movements  1 














- Medications


Medications: 


 Current Medications





Acetaminophen (Tylenol 650mg/20.3ml Solution Ud)  650 mg PO Q6 PRN


   PRN Reason: Fever


   Last Admin: 12/13/15 20:07 Dose:  650 mg


Acetaminophen (Tylenol 650 Mg Supp)  650 mg IN Q6 PRN


   PRN Reason: fevers


   Last Admin: 12/14/15 06:53 Dose:  650 mg


Aspirin (Ecotrin)  81 mg PO DAILY [facility]


   Last Admin: 12/15/15 08:45 Dose:  81 mg


Enalapril Maleate (Vasotec)  10 mg PO DAILY [facility]


   Last Admin: 12/15/15 08:47 Dose:  10 mg


Enoxaparin Sodium (Lovenox)  40 mg SC DAILY [facility]


Famotidine (Pepcid)  20 mg GT BID [facility]


   Last Admin: 12/15/15 08:46 Dose:  20 mg


Vancomycin HCl 1 gm/ Sodium (Chloride)  250 mls @ 125 mls/hr IVPB Q12 [facility]


   Last Admin: 12/15/15 08:46 Dose:  125 mls/hr


Ceftriaxone Sodium 2 gm/ (Sodium Chloride)  100 mls @ 100 mls/hr IVPB DAILY [facility]


   Last Admin: 12/15/15 08:46 Dose:  100 mls/hr


Propofol (Diprivan)  100 mls @ 2.041 mls/hr IV .Q24H [facility]; 5 MCG/KG/MIN


   PRN Reason: Protocol


   Stop: 12/15/15 22:32


   Last Admin: 12/14/15 22:26 Dose:  2.041 mls/hr


Insulin Human Regular (Humulin R)  0 units SC ACHS [facility]


   PRN Reason: Protocol


   Last Admin: 12/15/15 11:41 Dose:  Not Given


Lorazepam (Ativan)  2 mg IVP Q6 PRN


   PRN Reason: Agitation


   Last Admin: 12/15/15 07:30 Dose:  2 mg


Morphine Sulfate (Morphine)  4 mg IVP Q2H PRN


   PRN Reason: Pain, moderate (4-7)


   Last Admin: 12/14/15 02:33 Dose:  4 mg


Multivitamins/Vitamin C (Multi-Delyn Liquid)  5 ml PO DAILY [facility]


   Last Admin: 12/15/15 08:46 Dose:  5 ml


Nitroglycerin (Nitro-Bid 2% Oint)  2 in TOP Q6 [facility]


   Last Admin: 12/15/15 04:00 Dose:  2 in











- Labs


Labs (last 24 hours): 


 Laboratory Results - last 24 hr











  12/14/15 12/14/15 12/15/15





  16:39 21:23 04:00


 


WBC    9.9


 


RBC    3.45 L


 


Hgb    11.4 L


 


Hct    36.3


 


MCV    105.4 H


 


MCH    33.0 H


 


MCHC    31.3 L


 


RDW    13.9


 


Plt Count    270


 


pCO2   


 


pO2   


 


HCO3   


 


ABG pH   


 


ABG Total CO2   


 


ABG O2 Saturation   


 


ABG O2 Content   


 


ABG Base Excess   


 


ABG Hemoglobin   


 


ABG Carboxyhemoglobin   


 


POC ABG HHb (Measured)   


 


ABG Methemoglobin   


 


ABG O2 Capacity   


 


Chu Test   


 


A-a O2 Difference   


 


Hgb O2 Saturation   


 


FiO2   


 


Blood Gas Comments   


 


Crit Value Called To   


 


Crit Value Read Back   


 


Sodium    149 H


 


Potassium    3.5 L


 


Chloride    110 H


 


Carbon Dioxide    29


 


Anion Gap    14


 


BUN    13


 


Creatinine    0.9


 


Est GFR ( Amer)    > 60


 


Est GFR (Non-Af Amer)    > 60


 


POC Glucose (mg/dL)  93  103 


 


Random Glucose    116 H


 


Calcium    9.1














  12/15/15 12/15/15 12/15/15





  05:12 06:23 11:11


 


WBC   


 


RBC   


 


Hgb   


 


Hct   


 


MCV   


 


MCH   


 


MCHC   


 


RDW   


 


Plt Count   


 


pCO2  42  


 


pO2  110 H  


 


HCO3  30.4 H  


 


ABG pH  7.48 H  


 


ABG Total CO2  32.6 H  


 


ABG O2 Saturation  98.3 H  


 


ABG O2 Content  16.2  


 


ABG Base Excess  7.1 H  


 


ABG Hemoglobin  11.9  


 


ABG Carboxyhemoglobin  1.3  


 


POC ABG HHb (Measured)  1.7  


 


ABG Methemoglobin  1.2  


 


ABG O2 Capacity  16.5  


 


Chu Test  Yes  


 


A-a O2 Difference  194.0  


 


Hgb O2 Saturation  95.8  


 


FiO2  50.0  


 


Blood Gas Comments  Prvc/ac/16/600/50%/+  


 


Crit Value Called To  rn silvia  


 


Crit Value Read Back  N  


 


Sodium   


 


Potassium   


 


Chloride   


 


Carbon Dioxide   


 


Anion Gap   


 


BUN   


 


Creatinine   


 


Est GFR ( Amer)   


 


Est GFR (Non-Af Amer)   


 


POC Glucose (mg/dL)   108  94


 


Random Glucose   


 


Calcium   














- GI/Abdominal Exam


GI & Abdominal Exam: Normal Bowel Sounds, Soft





Assessment/Plan





- Assessment and Plan (Free Text)


Assessment: 


54 yo male with dysphagia





peg when able

## 2015-12-15 NOTE — CP.PCM.PN
Subjective





- Subjective


Subjective: 


t max lower 





Objective





- Vital Signs/Intake and Output


Vital Signs (last 24 hours): 


 Vital Signs - 24 hr











  12/14/15 12/14/15 12/14/15





  11:00 12:00 13:00


 


Temperature 100.4 F H 100.2 F H 99.9 F H


 


Pulse Rate 93 H 98 H 92 H


 


Respiratory 16 17 16





Rate   


 


Blood Pressure 150/96 H  159/102 H


 


O2 Sat by Pulse 100 100 99





Oximetry   














  12/14/15 12/14/15 12/14/15





  14:00 15:00 16:00


 


Temperature 99.9 F H 99 F 99 F


 


Pulse Rate 85 98 H 88


 


Respiratory 16 17 16





Rate   


 


Blood Pressure 130/83 152/83 H 126/78


 


O2 Sat by Pulse 100 99 100





Oximetry   














  12/14/15 12/14/15 12/14/15





  17:00 18:00 19:00


 


Temperature 99.7 F H 100.4 F H 100.4 F H


 


Pulse Rate 95 H 97 H 95 H


 


Respiratory 17 18 17





Rate   


 


Blood Pressure 130/83 168/117 H 156/98 H


 


O2 Sat by Pulse 100 100 100





Oximetry   














  12/14/15 12/14/15 12/14/15





  20:00 21:00 22:00


 


Temperature 100.4 F H  


 


Pulse Rate 105 H 103 H 99 H


 


Respiratory 17 21 16





Rate   


 


Blood Pressure 154/89 H 141/94 H 144/90


 


O2 Sat by Pulse 99 98 99





Oximetry   














  12/14/15 12/15/15 12/15/15





  23:00 00:00 01:00


 


Temperature  100.8 F H 


 


Pulse Rate 96 H 98 H 96 H


 


Respiratory 16 16 18





Rate   


 


Blood Pressure 145/92 H 147/87 151/95 H


 


O2 Sat by Pulse 98 98 99





Oximetry   














  12/15/15 12/15/15 12/15/15





  02:00 03:00 04:00


 


Temperature   99.4 F


 


Pulse Rate 92 H 92 H 104 H


 


Respiratory 17 16 21





Rate   


 


Blood Pressure 152/95 H 155/96 H 171/119 H


 


O2 Sat by Pulse 99 100 100





Oximetry   














  12/15/15 12/15/15 12/15/15





  05:00 06:00 07:00


 


Temperature   


 


Pulse Rate 93 H 91 H 91 H


 


Respiratory 18 18 16





Rate   


 


Blood Pressure 171/108 H 179/124 H 158/96 H


 


O2 Sat by Pulse 100 100 100





Oximetry   














  12/15/15 12/15/15 12/15/15





  08:00 09:00 10:00


 


Temperature 99.5 F  98.9 F


 


Pulse Rate 87 86 87


 


Respiratory 16 16 16





Rate   


 


Blood Pressure 192/94 H 124/73 134/85


 


O2 Sat by Pulse 97 99 99





Oximetry   











Intake and Output (last 12 hours): 


 Intake & Output











 12/14/15 12/15/15 12/15/15





 18:59 06:59 18:59


 


Intake Total 1823 2180 865


 


Output Total 1400 1075 


 


Balance 423 1105 865


 


Intake:   


 


   1070 265


 


  Intake, Piggyback 550 250 350


 


  Oral 0  


 


  Tube Feeding 330 660 


 


  Free Water Flush 100 200 250


 


Output:   


 


  Urine 900 1075 


 


    Urethral ([place]) 900 1075 


 


  Stool 500  


 


Other:   


 


  # Bowel Movements  1 














- Medications


Medications: 


 Current Medications





Acetaminophen (Tylenol 650mg/20.3ml Solution Ud)  650 mg PO Q6 PRN


   PRN Reason: Fever


   Last Admin: 12/13/15 20:07 Dose:  650 mg


Acetaminophen (Tylenol 650 Mg Supp)  650 mg AL Q6 PRN


   PRN Reason: fevers


   Last Admin: 12/14/15 06:53 Dose:  650 mg


Aspirin (Ecotrin)  81 mg PO DAILY [facility]


   Last Admin: 12/15/15 08:45 Dose:  81 mg


Enalapril Maleate (Vasotec)  10 mg PO DAILY [facility]


   Last Admin: 12/15/15 08:47 Dose:  10 mg


Enoxaparin Sodium (Lovenox)  40 mg SC DAILY [facility]


Famotidine (Pepcid)  20 mg GT BID [facility]


   Last Admin: 12/15/15 08:46 Dose:  20 mg


Vancomycin HCl 1 gm/ Sodium (Chloride)  250 mls @ 125 mls/hr IVPB Q12 [facility]


   Last Admin: 12/15/15 08:46 Dose:  125 mls/hr


Ceftriaxone Sodium 2 gm/ (Sodium Chloride)  100 mls @ 100 mls/hr IVPB DAILY [facility]


   Last Admin: 12/15/15 08:46 Dose:  100 mls/hr


Propofol (Diprivan)  100 mls @ 2.041 mls/hr IV .Q24H [facility]; 5 MCG/KG/MIN


   PRN Reason: Protocol


   Stop: 12/15/15 22:32


   Last Admin: 12/14/15 22:26 Dose:  2.041 mls/hr


Insulin Human Regular (Humulin R)  0 units SC ACHS [facility]


   PRN Reason: Protocol


   Last Admin: 12/15/15 07:20 Dose:  Not Given


Lorazepam (Ativan)  2 mg IVP Q6 PRN


   PRN Reason: Agitation


   Last Admin: 12/15/15 07:30 Dose:  2 mg


Morphine Sulfate (Morphine)  4 mg IVP Q2H PRN


   PRN Reason: Pain, moderate (4-7)


   Last Admin: 12/14/15 02:33 Dose:  4 mg


Multivitamins/Vitamin C (Multi-Delyn Liquid)  5 ml PO DAILY [facility]


   Last Admin: 12/15/15 08:46 Dose:  5 ml


Nitroglycerin (Nitro-Bid 2% Oint)  2 in TOP Q6 [facility]


   Last Admin: 12/15/15 04:00 Dose:  2 in











- Labs


Labs (last 24 hours): 


 Laboratory Results - last 24 hr











  12/14/15 12/14/15 12/14/15





  11:23 16:39 21:23


 


WBC   


 


RBC   


 


Hgb   


 


Hct   


 


MCV   


 


MCH   


 


MCHC   


 


RDW   


 


Plt Count   


 


pCO2   


 


pO2   


 


HCO3   


 


ABG pH   


 


ABG Total CO2   


 


ABG O2 Saturation   


 


ABG O2 Content   


 


ABG Base Excess   


 


ABG Hemoglobin   


 


ABG Carboxyhemoglobin   


 


POC ABG HHb (Measured)   


 


ABG Methemoglobin   


 


ABG O2 Capacity   


 


Chu Test   


 


A-a O2 Difference   


 


Hgb O2 Saturation   


 


FiO2   


 


Blood Gas Comments   


 


Crit Value Called To   


 


Crit Value Read Back   


 


Sodium   


 


Potassium   


 


Chloride   


 


Carbon Dioxide   


 


Anion Gap   


 


BUN   


 


Creatinine   


 


Est GFR ( Amer)   


 


Est GFR (Non-Af Amer)   


 


POC Glucose (mg/dL)  61 L  93  103


 


Random Glucose   


 


Calcium   














  12/15/15 12/15/15 12/15/15





  04:00 05:12 06:23


 


WBC  9.9  


 


RBC  3.45 L  


 


Hgb  11.4 L  


 


Hct  36.3  


 


MCV  105.4 H  


 


MCH  33.0 H  


 


MCHC  31.3 L  


 


RDW  13.9  


 


Plt Count  270  


 


pCO2   42 


 


pO2   110 H 


 


HCO3   30.4 H 


 


ABG pH   7.48 H 


 


ABG Total CO2   32.6 H 


 


ABG O2 Saturation   98.3 H 


 


ABG O2 Content   16.2 


 


ABG Base Excess   7.1 H 


 


ABG Hemoglobin   11.9 


 


ABG Carboxyhemoglobin   1.3 


 


POC ABG HHb (Measured)   1.7 


 


ABG Methemoglobin   1.2 


 


ABG O2 Capacity   16.5 


 


Chu Test   Yes 


 


A-a O2 Difference   194.0 


 


Hgb O2 Saturation   95.8 


 


FiO2   50.0 


 


Blood Gas Comments   Prvc/ac/16/600/50%/+ 


 


Crit Value Called To   rn erll 


 


Crit Value Read Back   N 


 


Sodium  149 H  


 


Potassium  3.5 L  


 


Chloride  110 H  


 


Carbon Dioxide  29  


 


Anion Gap  14  


 


BUN  13  


 


Creatinine  0.9  


 


Est GFR ( Amer)  > 60  


 


Est GFR (Non-Af Amer)  > 60  


 


POC Glucose (mg/dL)    108


 


Random Glucose  116 H  


 


Calcium  9.1  














Assessment/Plan


(1) Agitation


Current Visit: Yes   Status: Acute


Comment: cont propofol for agitation


unsure if this is lower brainstem activity vs seizure activity vs other etiology








(2) Aspiration pneumonia


Current Visit: Yes   Status: Acute


Comment: worsened cxr, cont anbx, ID condult, trach placement








(3) Cardiac arrest


Current Visit: Yes   Status: Acute


Comment: s/p acls proceudres, ST w/ periods of SB


echo


cardio consult


pt remains on propofol for agitation


hr remains elevated unsure if this is related to fever, agitation or other 

etiology


cath when stable as per cardio


s/p cooling protocol


no cardiac intervention planned at present








(4) Diabetes mellitus with hyperglycemia


Current Visit: Yes   Status: Acute


Comment: riss per protocol








(5) History of seizure


Current Visit: Yes   Status: Acute


Comment: neuro consult


?? eeg








(6) NSTEMI (non-ST elevated myocardial infarction)


Current Visit: Yes   Status: Acute

## 2015-12-16 LAB
ARTERIAL BLOOD GAS HEMOGLOBIN: 14.2 G/DL (ref 11.7–17.4)
ARTERIAL BLOOD GAS O2 SAT: 98.1 % (ref 95–98)
ARTERIAL BLOOD GAS PCO2: 41 MM/HG (ref 35–45)
ARTERIAL BLOOD GAS TCO2: 31.8 MMOL/L (ref 22–28)
ARTERIAL PATENCY WRIST A: YES
BUN SERPL-MCNC: 14 MG/DL (ref 9–20)
CALCIUM SERPL-MCNC: 8.6 MG/DL (ref 8.4–10.2)
ERYTHROCYTE [DISTWIDTH] IN BLOOD BY AUTOMATED COUNT: 13.9 % (ref 11.5–14.5)
GFR NON-AFRICAN AMERICAN: > 60
HCO3 BLDA-SCNC: 29.9 MMOL/L (ref 21–28)
HGB BLD-MCNC: 10.3 G/DL (ref 12–18)
INHALED O2 CONCENTRATION: 50 %
MCH RBC QN AUTO: 33 PG (ref 27–31)
MCHC RBC AUTO-ENTMCNC: 31.8 G/DL (ref 33–37)
MCV RBC AUTO: 103.8 FL (ref 80–94)
O2 CAP BLDA-SCNC: 19.6 ML/DL (ref 16–24)
O2 CT BLDA-SCNC: 19.2 ML/DL (ref 15–23)
PH BLDA: 7.48 [PH] (ref 7.35–7.45)
PLATELET # BLD: 303 K/UL (ref 130–400)
PO2 BLDA: 93 MM/HG (ref 80–100)
RBC # BLD AUTO: 3.13 MIL/UL (ref 4.4–5.9)
WBC # BLD AUTO: 9.4 K/UL (ref 4.8–10.8)

## 2015-12-16 PROCEDURE — 05H533Z INSERTION OF INFUSION DEVICE INTO RIGHT SUBCLAVIAN VEIN, PERCUTANEOUS APPROACH: ICD-10-PCS

## 2015-12-16 RX ADMIN — ACETAMINOPHEN PRN MG: 160 SOLUTION ORAL at 22:56

## 2015-12-16 RX ADMIN — NITROGLYCERIN SCH IN: 20 OINTMENT TOPICAL at 16:09

## 2015-12-16 RX ADMIN — NITROGLYCERIN SCH IN: 20 OINTMENT TOPICAL at 21:36

## 2015-12-16 RX ADMIN — NITROGLYCERIN SCH IN: 20 OINTMENT TOPICAL at 10:38

## 2015-12-16 RX ADMIN — ENOXAPARIN SODIUM SCH MG: 40 INJECTION SUBCUTANEOUS at 08:46

## 2015-12-16 RX ADMIN — NITROGLYCERIN SCH IN: 20 OINTMENT TOPICAL at 04:00

## 2015-12-16 RX ADMIN — Medication SCH ML: at 08:46

## 2015-12-16 NOTE — CP.CCUPN
CCU Subjective





- Physician Review


Subjective (Free Text): 





***INTENSIVIST PROCEDURE NOTE*** 





CENTRAL LINE INSERTION 


 


Indications: Poor peripheral IV access, need for rapid IVF.





After standard Time-Out procedure / protocol, and under emergent conditions in 

ICU, using sterile technique and full barrier precautions, an Arrow Triple 

Lumen Catheter inserted into Right Subclavian Vein without any complications. 

All ports with good venous blood return and flushed with sterile saline. 

Patient tolerated the procedure well. Post procedure CXR shows satisfactory 

position of tip in distal SVC, no PTX seen. 


. 





CCU Objective





- Patient Studies


Lab Studies: 


 Microbiology Studies











 12/10/15 13:50 Blood Culture - Final





 Blood-Venous    NO GROWTH AFTER 5 DAYS





 Gram Stain - Final





    TEST NOT PERFORMED


 


 12/10/15 13:50 Blood Culture - Final





 Blood-Venous    NO GROWTH AFTER 5 DAYS





 Gram Stain - Final





    TEST NOT PERFORMED








 Lab Studies











  12/16/15 12/16/15 12/16/15 Range/Units





  10:44 06:45 05:28 


 


WBC     (4.8-10.8)  K/uL


 


RBC     (4.40-5.90)  Mil/uL


 


Hgb     (12.0-18.0)  g/dL


 


Hct     (35.0-51.0)  %


 


MCV     (80.0-94.0)  fl


 


MCH     (27.0-31.0)  pg


 


MCHC     (33.0-37.0)  g/dL


 


RDW     (11.5-14.5)  %


 


Plt Count     (130-400)  K/uL


 


pCO2    41  (35-45)  mm/Hg


 


pO2    93  ()  mm/Hg


 


HCO3    29.9 H  (21-28)  mmol/L


 


ABG pH    7.48 H  (7.35-7.45)  


 


ABG Total CO2    31.8 H  (22-28)  mmol/L


 


ABG O2 Saturation    98.1 H  (95-98)  %


 


ABG O2 Content    19.2  (15-23)  ML/dL


 


ABG Base Excess    6.4 H  (-2.0-3.0)  mmol/L


 


ABG Hemoglobin    14.2  (11.7-17.4)  g/dL


 


ABG Carboxyhemoglobin    1.7 H  (0.5-1.5)  %


 


POC ABG HHb (Measured)    1.9  (0.0-5.0)  %


 


ABG Methemoglobin    0.7  (0.0-3.0)  %


 


ABG O2 Capacity    19.6  (16-24)  mL/dL


 


Chu Test    Yes  


 


A-a O2 Difference    212.0  mm/Hg


 


Hgb O2 Saturation    95.7  (95.0-98.0)  %


 


FiO2    50.0  %


 


Blood Gas Comments    333  


 


Blood Gas Notified Time    543  


 


Sodium     (132-148)  mmol/l


 


Potassium     (3.6-5.0)  MMOL/L


 


Chloride     ()  mmol/L


 


Carbon Dioxide     (22-30)  mmol/L


 


Anion Gap     (10-20)  


 


BUN     (9-20)  mg/dl


 


Creatinine     (0.8-1.5)  mg/dL


 


Est GFR (African Amer)     


 


Est GFR (Non-Af Amer)     


 


POC Glucose (mg/dL)  104  102   ()  mg/dL


 


Random Glucose     ()  mg/dL


 


Calcium     (8.4-10.2)  mg/dL














  12/16/15 12/15/15 12/15/15 Range/Units





  04:10 21:50 16:42 


 


WBC  9.4    (4.8-10.8)  K/uL


 


RBC  3.13 L    (4.40-5.90)  Mil/uL


 


Hgb  10.3 L    (12.0-18.0)  g/dL


 


Hct  32.5 L    (35.0-51.0)  %


 


MCV  103.8 H    (80.0-94.0)  fl


 


MCH  33.0 H    (27.0-31.0)  pg


 


MCHC  31.8 L    (33.0-37.0)  g/dL


 


RDW  13.9    (11.5-14.5)  %


 


Plt Count  303    (130-400)  K/uL


 


pCO2     (35-45)  mm/Hg


 


pO2     ()  mm/Hg


 


HCO3     (21-28)  mmol/L


 


ABG pH     (7.35-7.45)  


 


ABG Total CO2     (22-28)  mmol/L


 


ABG O2 Saturation     (95-98)  %


 


ABG O2 Content     (15-23)  ML/dL


 


ABG Base Excess     (-2.0-3.0)  mmol/L


 


ABG Hemoglobin     (11.7-17.4)  g/dL


 


ABG Carboxyhemoglobin     (0.5-1.5)  %


 


POC ABG HHb (Measured)     (0.0-5.0)  %


 


ABG Methemoglobin     (0.0-3.0)  %


 


ABG O2 Capacity     (16-24)  mL/dL


 


Chu Test     


 


A-a O2 Difference     mm/Hg


 


Hgb O2 Saturation     (95.0-98.0)  %


 


FiO2     %


 


Blood Gas Comments     


 


Blood Gas Notified Time     


 


Sodium  148    (132-148)  mmol/l


 


Potassium  3.2 L    (3.6-5.0)  MMOL/L


 


Chloride  109 H    ()  mmol/L


 


Carbon Dioxide  30    (22-30)  mmol/L


 


Anion Gap  12    (10-20)  


 


BUN  14    (9-20)  mg/dl


 


Creatinine  1.0    (0.8-1.5)  mg/dL


 


Est GFR (African Amer)  > 60    


 


Est GFR (Non-Af Amer)  > 60    


 


POC Glucose (mg/dL)   105  97  ()  mg/dL


 


Random Glucose  115 H    ()  mg/dL


 


Calcium  8.6    (8.4-10.2)  mg/dL








 Laboratory Results - last 24 hr











  12/15/15 12/15/15 12/16/15





  16:42 21:50 04:10


 


WBC    9.4


 


RBC    3.13 L


 


Hgb    10.3 L


 


Hct    32.5 L


 


MCV    103.8 H


 


MCH    33.0 H


 


MCHC    31.8 L


 


RDW    13.9


 


Plt Count    303


 


pCO2   


 


pO2   


 


HCO3   


 


ABG pH   


 


ABG Total CO2   


 


ABG O2 Saturation   


 


ABG O2 Content   


 


ABG Base Excess   


 


ABG Hemoglobin   


 


ABG Carboxyhemoglobin   


 


POC ABG HHb (Measured)   


 


ABG Methemoglobin   


 


ABG O2 Capacity   


 


Chu Test   


 


A-a O2 Difference   


 


Hgb O2 Saturation   


 


FiO2   


 


Blood Gas Comments   


 


Blood Gas Notified Time   


 


Sodium    148


 


Potassium    3.2 L


 


Chloride    109 H


 


Carbon Dioxide    30


 


Anion Gap    12


 


BUN    14


 


Creatinine    1.0


 


Est GFR ( Amer)    > 60


 


Est GFR (Non-Af Amer)    > 60


 


POC Glucose (mg/dL)  97  105 


 


Random Glucose    115 H


 


Calcium    8.6














  12/16/15 12/16/15 12/16/15





  05:28 06:45 10:44


 


WBC   


 


RBC   


 


Hgb   


 


Hct   


 


MCV   


 


MCH   


 


MCHC   


 


RDW   


 


Plt Count   


 


pCO2  41  


 


pO2  93  


 


HCO3  29.9 H  


 


ABG pH  7.48 H  


 


ABG Total CO2  31.8 H  


 


ABG O2 Saturation  98.1 H  


 


ABG O2 Content  19.2  


 


ABG Base Excess  6.4 H  


 


ABG Hemoglobin  14.2  


 


ABG Carboxyhemoglobin  1.7 H  


 


POC ABG HHb (Measured)  1.9  


 


ABG Methemoglobin  0.7  


 


ABG O2 Capacity  19.6  


 


Chu Test  Yes  


 


A-a O2 Difference  212.0  


 


Hgb O2 Saturation  95.7  


 


FiO2  50.0  


 


Blood Gas Comments  333  


 


Blood Gas Notified Time  543  


 


Sodium   


 


Potassium   


 


Chloride   


 


Carbon Dioxide   


 


Anion Gap   


 


BUN   


 


Creatinine   


 


Est GFR ( Amer)   


 


Est GFR (Non-Af Amer)   


 


POC Glucose (mg/dL)   102  104


 


Random Glucose   


 


Calcium

## 2015-12-16 NOTE — CP.PCM.PN
Subjective





- Subjective


Subjective: 


pt comfortable on sedation, able to open eyes and move foot to commands. temp 

still low 100s, b/w and imaging noted.


pending state guardian





Review of Systems





- Review of Systems


Systems not reviewed;Unavailable: Intubated





- Constitutional


Constitutional: Fever





- EENT


Eyes: UNREMARKABLE


Ears: UNREMARKABLE


Nose/Mouth/Throat: UNREMARKABLE





- Cardiovascular


Cardiovascular: UNREMARKABLE





- Respiratory


Respiratory: As Per HPI, Chest Congestion





- Gastrointestinal


Gastrointestinal: UNREMARKABLE





- Genitourinary


Genitourinary: UNREMARKABLE





- Reproductive: Male


Reproductive:Male: UNREMARKABLE





- Musculoskeletal


Musculoskeletal: UNREMARKABLE





- Integumentary


Integumentary: UNREMARKABLE





- Neurological


Neurological: UNREMARKABLE





- Psychiatric


Psychiatric: UNREMARKABLE





- Endocrine


Endocrine: UNREMARKABLE





- Hematologic/Lymphatic


Hematologic: UNREMARKABLE





Objective





- Vital Signs/Intake and Output


Vital Signs (last 24 hours): 


 Vital Signs - 24 hr











  12/15/15 12/15/15 12/15/15





  10:00 11:00 12:00


 


Temperature 98.9 F 99.8 F H 99.2 F


 


Pulse Rate 87 87 98 H


 


Respiratory 16 16 15





Rate   


 


Blood Pressure 134/85 140/77 133/86


 


O2 Sat by Pulse 99 97 98





Oximetry   














  12/15/15 12/15/15 12/15/15





  13:00 14:00 15:00


 


Temperature 99.9 F H 100.7 F H 100.5 F H


 


Pulse Rate 100 H 95 H 90


 


Respiratory 18 18 18





Rate   


 


Blood Pressure 138/80 133/86 138/86


 


O2 Sat by Pulse 98 97 97





Oximetry   














  12/15/15 12/15/15 12/15/15





  16:00 17:00 17:57


 


Temperature 100 F H 100 F H 99.4 F


 


Pulse Rate 89 81 87


 


Respiratory 17 16 16





Rate   


 


Blood Pressure 141/87 130/88 130/88


 


O2 Sat by Pulse 98 100 100





Oximetry   














  12/15/15 12/15/15 12/15/15





  19:00 20:00 21:00


 


Temperature 99.2 F  


 


Pulse Rate 92 H 91 H 96 H


 


Respiratory 16 16 17





Rate   


 


Blood Pressure 116/113 H 162/103 H 160/101 H


 


O2 Sat by Pulse 98 98 99





Oximetry   














  12/15/15 12/15/15 12/16/15





  22:00 23:00 01:00


 


Temperature   


 


Pulse Rate 102 H 118 H 109 H


 


Respiratory 18 18 18





Rate   


 


Blood Pressure 157/98 H 141/103 H 147/92 H


 


O2 Sat by Pulse 97 97 97





Oximetry   














  12/16/15 12/16/15 12/16/15





  02:00 03:00 04:00


 


Temperature  100.9 F H 101 F H


 


Pulse Rate 115 H 119 H 106 H


 


Respiratory 17 19 19





Rate   


 


Blood Pressure 143/90 133/89 141/95 H


 


O2 Sat by Pulse 96 99 97





Oximetry   














  12/16/15 12/16/15 12/16/15





  05:00 06:00 08:00


 


Temperature   99.7 F H


 


Pulse Rate 105 H 97 H 99 H


 


Respiratory 21 24 17





Rate   


 


Blood Pressure 164/108 H 148/93 H 146/90


 


O2 Sat by Pulse 100 99 99





Oximetry   











Intake and Output (last 12 hours): 


 Intake & Output











 12/15/15 12/16/15 12/16/15





 18:59 06:59 18:59


 


Intake Total 2145 1795 


 


Output Total 500  


 


Balance 1645 1795 


 


Intake:   


 


  IV 1005 695 


 


  Intake, Piggyback 350 250 


 


  Tube Feeding 440 650 


 


  Free Water Flush 350 200 


 


Output:   


 


  Urine 500  


 


    Urethral ([place]) 500  














- Medications


Medications: 


 Current Medications





Acetaminophen (Tylenol 650mg/20.3ml Solution Ud)  650 mg PO Q6 PRN


   PRN Reason: Fever


   Last Admin: 12/13/15 20:07 Dose:  650 mg


Acetaminophen (Tylenol 650 Mg Supp)  650 mg NC Q6 PRN


   PRN Reason: fevers


   Last Admin: 12/14/15 06:53 Dose:  650 mg


Aspirin (Ecotrin)  81 mg PO DAILY [facility]


   Last Admin: 12/16/15 08:46 Dose:  81 mg


Enalapril Maleate (Vasotec)  10 mg PO DAILY [facility]


   Last Admin: 12/16/15 08:47 Dose:  10 mg


Enoxaparin Sodium (Lovenox)  40 mg SC DAILY [facility]


   Last Admin: 12/16/15 08:46 Dose:  40 mg


Famotidine (Pepcid)  20 mg GT BID [facility]


   Last Admin: 12/16/15 08:46 Dose:  20 mg


Vancomycin HCl 1 gm/ Sodium (Chloride)  250 mls @ 125 mls/hr IVPB Q12 [facility]


   Last Admin: 12/15/15 21:19 Dose:  125 mls/hr


Ceftriaxone Sodium 2 gm/ (Sodium Chloride)  100 mls @ 100 mls/hr IVPB DAILY [facility]


   Last Admin: 12/16/15 08:46 Dose:  100 mls/hr


Propofol (Diprivan)  100 mls @ 4.082 mls/hr IV .Q24H [facility]; 10 MCG/KG/MIN


   PRN Reason: Protocol


   Stop: 12/17/15 02:11


   Last Admin: 12/16/15 02:14 Dose:  4.082 mls/hr


Sodium Chloride (Sodium Chloride 0.9%)  1,000 mls @ 75 mls/hr IV .[id number] [facility]


   Stop: 12/18/15 02:11


   Last Admin: 12/16/15 02:16 Dose:  75 mls/hr


Insulin Human Regular (Humulin R)  0 units SC ACHS [facility]


   PRN Reason: Protocol


   Last Admin: 12/16/15 06:50 Dose:  Not Given


Multivitamins/Vitamin C (Multi-Delyn Liquid)  5 ml PO DAILY [facility]


   Last Admin: 12/16/15 08:46 Dose:  5 ml


Nitroglycerin (Nitro-Bid 2% Oint)  2 in TOP Q6 [facility]


   Last Admin: 12/16/15 04:00 Dose:  2 in











- Labs


Labs (last 24 hours): 


 Laboratory Results - last 24 hr











  12/15/15 12/15/15 12/15/15





  11:11 16:42 21:50


 


WBC   


 


RBC   


 


Hgb   


 


Hct   


 


MCV   


 


MCH   


 


MCHC   


 


RDW   


 


Plt Count   


 


pCO2   


 


pO2   


 


HCO3   


 


ABG pH   


 


ABG Total CO2   


 


ABG O2 Saturation   


 


ABG O2 Content   


 


ABG Base Excess   


 


ABG Hemoglobin   


 


ABG Carboxyhemoglobin   


 


POC ABG HHb (Measured)   


 


ABG Methemoglobin   


 


ABG O2 Capacity   


 


Chu Test   


 


A-a O2 Difference   


 


Hgb O2 Saturation   


 


FiO2   


 


Blood Gas Comments   


 


Blood Gas Notified Time   


 


Sodium   


 


Potassium   


 


Chloride   


 


Carbon Dioxide   


 


Anion Gap   


 


BUN   


 


Creatinine   


 


Est GFR ( Amer)   


 


Est GFR (Non-Af Amer)   


 


POC Glucose (mg/dL)  94  97  105


 


Random Glucose   


 


Calcium   














  12/16/15 12/16/15 12/16/15





  04:10 05:28 06:45


 


WBC  9.4  


 


RBC  3.13 L  


 


Hgb  10.3 L  


 


Hct  32.5 L  


 


MCV  103.8 H  


 


MCH  33.0 H  


 


MCHC  31.8 L  


 


RDW  13.9  


 


Plt Count  303  


 


pCO2   41 


 


pO2   93 


 


HCO3   29.9 H 


 


ABG pH   7.48 H 


 


ABG Total CO2   31.8 H 


 


ABG O2 Saturation   98.1 H 


 


ABG O2 Content   19.2 


 


ABG Base Excess   6.4 H 


 


ABG Hemoglobin   14.2 


 


ABG Carboxyhemoglobin   1.7 H 


 


POC ABG HHb (Measured)   1.9 


 


ABG Methemoglobin   0.7 


 


ABG O2 Capacity   19.6 


 


Chu Test   Yes 


 


A-a O2 Difference   212.0 


 


Hgb O2 Saturation   95.7 


 


FiO2   50.0 


 


Blood Gas Comments   333 


 


Blood Gas Notified Time   543 


 


Sodium  148  


 


Potassium  3.2 L  


 


Chloride  109 H  


 


Carbon Dioxide  30  


 


Anion Gap  12  


 


BUN  14  


 


Creatinine  1.0  


 


Est GFR ( Amer)  > 60  


 


Est GFR (Non-Af Amer)  > 60  


 


POC Glucose (mg/dL)    102


 


Random Glucose  115 H  


 


Calcium  8.6  














- Constitutional


Appears: Non-toxic, No Acute Distress, Chronically Ill





- Head Exam


Head Exam: ATRAUMATIC, NORMAL INSPECTION, NORMOCEPHALIC





- Eye Exam


Eye Exam: EOMI





- Respiratory Exam


Respiratory Exam: Clear to PA & Lateral


Additional comments: 


vented





- Cardiovascular Exam


Cardiovascular Exam: Tachycardia, REGULAR RHYTHM, RRR





- GI/Abdominal Exam


GI & Abdominal Exam: Normal Bowel Sounds, Soft, Unremarkable





- Extremities Exam


Extremities exam: normal capillary refill, normal inspection, pedal pulses 

present





- Skin


Skin Exam: Dry, Intact, Normal Color, Warm





Assessment/Plan


(1) Cardiac arrest


Current Visit: Yes   Status: Acute


Comment: s/p acls proceudres, ST w/ periods of SB


echo-noted


cardio consult


pt remains on propofol for agitation-to be weaned


hr remains elevated unsure if this is related to fever, agitation or other 

etiology


cath when stable as per cardio


s/p cooling protocol


no cardiac intervention planned at present








(2) DVT prophylaxis


Current Visit: Yes   Status: Acute


Comment: lovenox








(3) Scrotal edema


Current Visit: Yes   Status: Acute


Comment: scrotal us-noted


urology consult-dr cortes to see pt when stable








(4) History of seizure


Current Visit: Yes   Status: Acute


Comment: neuro consult


?? eeg








(5) Diabetes mellitus with hyperglycemia


Current Visit: Yes   Status: Acute


Comment: riss per protocol








(6) Agitation


Current Visit: Yes   Status: Acute


Comment: cont propofol for agitation


unsure if this is lower brainstem activity vs seizure activity vs other etiology








(7) UTI (urinary tract infection)


Current Visit: Yes   Status: Acute


Comment: cont anbx


mcgee cath


uro consult pending








(8) Aspiration pneumonia


Current Visit: Yes   Status: Acute


Comment: worsened cxr, cont anbx, ID condult, trach placement


tmax lower, on anbx











- Assessment and Plan (Free Text)


Assessment: 


more purposeful movements today, ?? able to wean off  vent as per icu attending

## 2015-12-16 NOTE — RAD
HISTORY:

ETT placement  



COMPARISON:

Multiple priors 



FINDINGS:

There is an endotracheal tube with its tip approximately at the level 

of the thoracic inlet. There is stable diffuse opacification of the 

left hemithorax. There is stable diffuse opacification in the right 

middle lobe and portions of the right upper lobe. The heart appears 

stable. 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



IMPRESSION:

Stable pulmonary disease.  Radiographically appropriate positioned 

ETT.

## 2015-12-16 NOTE — RAD
HISTORY:

s/p Right Subclavian TLC placement  



COMPARISON:

multiple prior studies 



FINDINGS:

There is a new right subclavian catheter.  There is no evidence of a 

pneumothorax.  There are worsened consolidations throughout both 

lungs.  There is an endotracheal tube with its tip at the thoracic 

inlet. There is An enteric tube tip below diaphragm. 



IMPRESSION:

New right-sided subclavian catheter with no evidence of a 

pneumothorax.



Worsening opacities throughout the lungs.

## 2015-12-16 NOTE — CP.CCUPN
CCU Subjective





- Physician Review


Subjective (Free Text): 


   





***INTENSIVIST PROGRESS NOTE***


Patient examined, interim events reviewed:





Awoke during sedation holiday with eyes open, appears calm and tolerated brief 

trial on CPAP PS fairly well. Not interactive to voice or verbal commands, 

observed moving legs spontaneously. Eyes open but do not blink to 

confrontation. After being off sedatives for approx 3 hours, developed 

generalized tonic-clonic seizure activity lasting 1 minute followed by extreme 

agitation and tachycardia with HR approaching 160 and at times up to 190 with 

short bursts of VT morphology. Amiodarone IVP bolus 150mg given. No cumulative 

effect on BP, but SPO2 down to 89% and FiO2 increased temporarily up to 100%. 

PAP increased to 40's associated with notice of ETT biting and after placement 

of oral airway, PAP pressures not resolved. Duoneb stat treatment ordered with 

some improvement. Propofol drip sedation and Ativan 2 mg stat dose given. After 

re-sedating patient,  tachycardia resolved back to sinus tachy at 120/min 

albeit while on Amiodarone drip as well., and BP remained stable at 130/74. 








EXAM-


HEENT: no icterus, no nystagmus, pupils 2-3 mm and reactive,  no gaze preference

,  no nystagmus


NECK: no visible JVD, supple, carotids equal upstroke bilat/no bruits


CHEST: decreased BS bases, no wheezes audible


HEART:  regular, distant, S1S2, no murmur audible, no rubs.


ABD:  soft, no increased distention, no focal tenderness,  no HSM. BS hypoactive

, 


EXT:    no edema, no peripheral/ digital cyanosis, no palpable cords, distal 

pulses intact and symmetrical


NEURO:  plantars no response. Otherwise flaccid legs. 


SKIN:   no rashes





LABS


WBC= 9.4


HGB= 10.3


PLTs = 303K


Na= 148


K=  3.2


HCO3= 30


BUN/Cr=  14/1.0


BS= 115


7.48/41/93


CXR: ETT position high above ru, bilateral prominent multi-lobular 

interstitial changes (my interp).





Assessment: 


1.   Cardiac Arrest with resuscitation from VT. 


2.   Post-Resuscitation Therapeutic Hypothermia-completed


3.   Anoxic Encephalopathy


4.     Aspiration Pneumonia


5.    H/o Seizure Disorder with recurrency








PLAN:


1.  Day # 9  on MV support, expect long term need for MV support, given slow to 

resolve aspiration pneumonia, and anoxic encephalopathy. Early trach, PEG and 

possible PICC advised. Though considered to be non-life threatening need to 

these procedures, await attainment of guardianship for patient. Ongoing 

attempts to locate next-of-kin noted ( 2 sisters).  


2.  Coverage for Klebsiella, on Vanco/Rocephin.


3.  EEG


4. Start Keppra. 








CCU Objective





- Vital Signs / Intake & Output


Vital Signs (Last 4 hours): 


Vital Signs











  Pulse Resp BP Pulse Ox


 


 12/16/15 10:00  96 H  29 H  167/106 H  97


 


 12/16/15 09:00  95 H  17  146/98 H  98











Intake and Output (Last 8hrs): 


 Intake & Output











 12/15/15 12/16/15 12/16/15





 22:59 06:59 14:59


 


Intake Total 1145 1350 620


 


Output Total 500  


 


Balance 645 1350 620


 


Intake:   


 


   600 300


 


  Intake, Piggyback 250  


 


  Tube Feeding 220 650 220


 


  Free Water Flush 200 100 100


 


Output:   


 


  Urine 500  


 


    Urethral (Lua) 500  














- Medications


Active Medications: 


Active Medications











Generic Name Dose Route Start Last Admin





  Trade Name Freq  PRN Reason Stop Dose Admin


 


Acetaminophen  650 mg 12/13/15 13:21 12/13/15 20:07





  Tylenol 650mg/20.3ml Solution Ud  PO   650 mg





  Q6 PRN   Administration





  Fever   


 


Acetaminophen  650 mg 12/13/15 23:57 12/14/15 06:53





  Tylenol 650 Mg Supp  OK   650 mg





  Q6 PRN   Administration





  fevers   


 


Albuterol/Ipratropium  3 ml 12/16/15 12:22  





  Duoneb 3 Mg/0.5 Mg (3 Ml) Ud  INH 12/16/15 12:23  





  STAT STA   


 


Aspirin  81 mg 12/13/15 13:30 12/16/15 08:46





  Ecotrin  PO   81 mg





  DAILY [facility]   Administration


 


Enalapril Maleate  10 mg 12/10/15 11:15 12/16/15 08:47





  Vasotec  PO   10 mg





  DAILY [facility]   Administration


 


Enoxaparin Sodium  40 mg 12/15/15 09:15 12/16/15 08:46





  Lovenox  SC   40 mg





  DAILY [facility]   Administration


 


Famotidine  20 mg 12/10/15 17:00 12/16/15 08:46





  Pepcid  GT   20 mg





  BID [facility]   Administration


 


Vancomycin HCl 1 gm/ Sodium  250 mls @ 125 mls/hr 12/08/15 18:45 12/16/15 10:38





  Chloride  IVPB   125 mls/hr





  Q12 [facility]   Administration


 


Ceftriaxone Sodium 2 gm/  100 mls @ 100 mls/hr 12/13/15 13:30 12/16/15 08:46





  Sodium Chloride  IVPB   100 mls/hr





  DAILY [facility]   Administration


 


Propofol  100 mls @ 4.082 mls/hr 12/16/15 02:15 12/16/15 02:14





  Diprivan  IV 12/17/15 02:11  4.082 mls/hr





  .Q24H [facility]   Administration





  Protocol   





  10 MCG/KG/MIN   


 


Sodium Chloride  1,000 mls @ 75 mls/hr 12/16/15 02:15 12/16/15 02:16





  Sodium Chloride 0.9%  IV 12/18/15 02:11  75 mls/hr





  .[id number] [facility]   Administration


 


Amiodarone HCl 450 mg/  259 mls @ 34.53 mls/hr 12/16/15 12:30  





  Dextrose  IVPB   





  .Q7H31M [facility]   





  Protocol   





  1 MG/MIN   


 


Amiodarone HCl 900 mg/  518 mls @ 34.53 mls/hr 12/16/15 12:32  





  Dextrose  IVPB   





  .Q15H1M [facility]   





  Protocol   





  1 MG/MIN   


 


Amiodarone HCl 150 mg/  103 mls @ 618 mls/hr 12/16/15 12:22  





  Dextrose  IVPB 12/16/15 12:31  





  ONCE ONE   





  Protocol   





  15 MG/MIN   


 


Insulin Human Regular  0 units 12/10/15 22:00 12/16/15 06:50





  Humulin R  SC   Not Given





  ACHS [facility]   





  Protocol   


 


Lorazepam  2 mg 12/16/15 12:26  





  Ativan  IVP 12/16/15 12:27  





  STAT STA   


 


Multivitamins/Vitamin C  5 ml 12/10/15 09:00 12/16/15 08:46





  Multi-Delyn Liquid  PO   5 ml





  DAILY [facility]   Administration


 


Nitroglycerin  2 in 12/10/15 16:00 12/16/15 10:38





  Nitro-Bid 2% Oint  TOP   2 in





  Q6 [facility]   Administration


 


Propofol  100 mg 12/16/15 12:25  





  Diprivan  IV 12/16/15 12:26  





  ONCE ONE   














- Patient Studies


Lab Studies: 


 Microbiology Studies











 12/10/15 13:50 Blood Culture - Final





 Blood-Venous    NO GROWTH AFTER 5 DAYS





 Gram Stain - Final





    TEST NOT PERFORMED


 


 12/10/15 13:50 Blood Culture - Final





 Blood-Venous    NO GROWTH AFTER 5 DAYS





 Gram Stain - Final





    TEST NOT PERFORMED








 Lab Studies











  12/16/15 12/16/15 12/16/15 Range/Units





  10:44 06:45 05:28 


 


WBC     (4.8-10.8)  K/uL


 


RBC     (4.40-5.90)  Mil/uL


 


Hgb     (12.0-18.0)  g/dL


 


Hct     (35.0-51.0)  %


 


MCV     (80.0-94.0)  fl


 


MCH     (27.0-31.0)  pg


 


MCHC     (33.0-37.0)  g/dL


 


RDW     (11.5-14.5)  %


 


Plt Count     (130-400)  K/uL


 


pCO2    41  (35-45)  mm/Hg


 


pO2    93  ()  mm/Hg


 


HCO3    29.9 H  (21-28)  mmol/L


 


ABG pH    7.48 H  (7.35-7.45)  


 


ABG Total CO2    31.8 H  (22-28)  mmol/L


 


ABG O2 Saturation    98.1 H  (95-98)  %


 


ABG O2 Content    19.2  (15-23)  ML/dL


 


ABG Base Excess    6.4 H  (-2.0-3.0)  mmol/L


 


ABG Hemoglobin    14.2  (11.7-17.4)  g/dL


 


ABG Carboxyhemoglobin    1.7 H  (0.5-1.5)  %


 


POC ABG HHb (Measured)    1.9  (0.0-5.0)  %


 


ABG Methemoglobin    0.7  (0.0-3.0)  %


 


ABG O2 Capacity    19.6  (16-24)  mL/dL


 


Chu Test    Yes  


 


A-a O2 Difference    212.0  mm/Hg


 


Hgb O2 Saturation    95.7  (95.0-98.0)  %


 


FiO2    50.0  %


 


Blood Gas Comments    333  


 


Blood Gas Notified Time    543  


 


Sodium     (132-148)  mmol/l


 


Potassium     (3.6-5.0)  MMOL/L


 


Chloride     ()  mmol/L


 


Carbon Dioxide     (22-30)  mmol/L


 


Anion Gap     (10-20)  


 


BUN     (9-20)  mg/dl


 


Creatinine     (0.8-1.5)  mg/dL


 


Est GFR (African Amer)     


 


Est GFR (Non-Af Amer)     


 


POC Glucose (mg/dL)  104  102   ()  mg/dL


 


Random Glucose     ()  mg/dL


 


Calcium     (8.4-10.2)  mg/dL














  12/16/15 12/15/15 12/15/15 Range/Units





  04:10 21:50 16:42 


 


WBC  9.4    (4.8-10.8)  K/uL


 


RBC  3.13 L    (4.40-5.90)  Mil/uL


 


Hgb  10.3 L    (12.0-18.0)  g/dL


 


Hct  32.5 L    (35.0-51.0)  %


 


MCV  103.8 H    (80.0-94.0)  fl


 


MCH  33.0 H    (27.0-31.0)  pg


 


MCHC  31.8 L    (33.0-37.0)  g/dL


 


RDW  13.9    (11.5-14.5)  %


 


Plt Count  303    (130-400)  K/uL


 


pCO2     (35-45)  mm/Hg


 


pO2     ()  mm/Hg


 


HCO3     (21-28)  mmol/L


 


ABG pH     (7.35-7.45)  


 


ABG Total CO2     (22-28)  mmol/L


 


ABG O2 Saturation     (95-98)  %


 


ABG O2 Content     (15-23)  ML/dL


 


ABG Base Excess     (-2.0-3.0)  mmol/L


 


ABG Hemoglobin     (11.7-17.4)  g/dL


 


ABG Carboxyhemoglobin     (0.5-1.5)  %


 


POC ABG HHb (Measured)     (0.0-5.0)  %


 


ABG Methemoglobin     (0.0-3.0)  %


 


ABG O2 Capacity     (16-24)  mL/dL


 


Chu Test     


 


A-a O2 Difference     mm/Hg


 


Hgb O2 Saturation     (95.0-98.0)  %


 


FiO2     %


 


Blood Gas Comments     


 


Blood Gas Notified Time     


 


Sodium  148    (132-148)  mmol/l


 


Potassium  3.2 L    (3.6-5.0)  MMOL/L


 


Chloride  109 H    ()  mmol/L


 


Carbon Dioxide  30    (22-30)  mmol/L


 


Anion Gap  12    (10-20)  


 


BUN  14    (9-20)  mg/dl


 


Creatinine  1.0    (0.8-1.5)  mg/dL


 


Est GFR (African Amer)  > 60    


 


Est GFR (Non-Af Amer)  > 60    


 


POC Glucose (mg/dL)   105  97  ()  mg/dL


 


Random Glucose  115 H    ()  mg/dL


 


Calcium  8.6    (8.4-10.2)  mg/dL








 Laboratory Results - last 24 hr











  12/15/15 12/15/15 12/16/15





  16:42 21:50 04:10


 


WBC    9.4


 


RBC    3.13 L


 


Hgb    10.3 L


 


Hct    32.5 L


 


MCV    103.8 H


 


MCH    33.0 H


 


MCHC    31.8 L


 


RDW    13.9


 


Plt Count    303


 


pCO2   


 


pO2   


 


HCO3   


 


ABG pH   


 


ABG Total CO2   


 


ABG O2 Saturation   


 


ABG O2 Content   


 


ABG Base Excess   


 


ABG Hemoglobin   


 


ABG Carboxyhemoglobin   


 


POC ABG HHb (Measured)   


 


ABG Methemoglobin   


 


ABG O2 Capacity   


 


Chu Test   


 


A-a O2 Difference   


 


Hgb O2 Saturation   


 


FiO2   


 


Blood Gas Comments   


 


Blood Gas Notified Time   


 


Sodium    148


 


Potassium    3.2 L


 


Chloride    109 H


 


Carbon Dioxide    30


 


Anion Gap    12


 


BUN    14


 


Creatinine    1.0


 


Est GFR ( Amer)    > 60


 


Est GFR (Non-Af Amer)    > 60


 


POC Glucose (mg/dL)  97  105 


 


Random Glucose    115 H


 


Calcium    8.6














  12/16/15 12/16/15 12/16/15





  05:28 06:45 10:44


 


WBC   


 


RBC   


 


Hgb   


 


Hct   


 


MCV   


 


MCH   


 


MCHC   


 


RDW   


 


Plt Count   


 


pCO2  41  


 


pO2  93  


 


HCO3  29.9 H  


 


ABG pH  7.48 H  


 


ABG Total CO2  31.8 H  


 


ABG O2 Saturation  98.1 H  


 


ABG O2 Content  19.2  


 


ABG Base Excess  6.4 H  


 


ABG Hemoglobin  14.2  


 


ABG Carboxyhemoglobin  1.7 H  


 


POC ABG HHb (Measured)  1.9  


 


ABG Methemoglobin  0.7  


 


ABG O2 Capacity  19.6  


 


Chu Test  Yes  


 


A-a O2 Difference  212.0  


 


Hgb O2 Saturation  95.7  


 


FiO2  50.0  


 


Blood Gas Comments  333  


 


Blood Gas Notified Time  543  


 


Sodium   


 


Potassium   


 


Chloride   


 


Carbon Dioxide   


 


Anion Gap   


 


BUN   


 


Creatinine   


 


Est GFR ( Amer)   


 


Est GFR (Non-Af Amer)   


 


POC Glucose (mg/dL)   102  104


 


Random Glucose   


 


Calcium   











Fingerstick Blood Sugar Results: 102

## 2015-12-16 NOTE — RAD
HISTORY:

Respiratory failure



COMPARISON:

Multiple prior studies 



FINDINGS:

Stable diffuse infiltrates are seen in both lungs.  There is an 

endotracheal tube with its tip 3 centimeters above the bifurcation. 

The osseous structures and cardiac silhouette appear normal.  The 

enteric tube has been removed.



IMPRESSION:

Stable diffuse pulmonary disease. Radiographically appropriate 

positioned ETT.

## 2015-12-16 NOTE — CP.PCM.PN
Subjective





- Subjective


Subjective: 


PATIENT had an episode of Vtach.  He was given amiodarone.  currently in sinus 

rhythm.





Review of Systems





- Review of Systems


Systems not reviewed;Unavailable: Intubated





Objective





- Vital Signs/Intake and Output


Vital Signs (last 24 hours): 


 Vital Signs - 24 hr











  12/15/15 12/15/15 12/15/15





  20:00 21:00 22:00


 


Temperature   


 


Pulse Rate 91 H 96 H 102 H


 


Respiratory 16 17 18





Rate   


 


Blood Pressure 162/103 H 160/101 H 157/98 H


 


O2 Sat by Pulse 98 99 97





Oximetry   














  12/15/15 12/16/15 12/16/15





  23:00 01:00 02:00


 


Temperature   


 


Pulse Rate 118 H 109 H 115 H


 


Respiratory 18 18 17





Rate   


 


Blood Pressure 141/103 H 147/92 H 143/90


 


O2 Sat by Pulse 97 97 96





Oximetry   














  12/16/15 12/16/15 12/16/15





  03:00 04:00 05:00


 


Temperature 100.9 F H 101 F H 


 


Pulse Rate 119 H 106 H 105 H


 


Respiratory 19 19 21





Rate   


 


Blood Pressure 133/89 141/95 H 164/108 H


 


O2 Sat by Pulse 99 97 100





Oximetry   














  12/16/15 12/16/15 12/16/15





  06:00 08:00 09:00


 


Temperature  99.7 F H 


 


Pulse Rate 97 H 99 H 95 H


 


Respiratory 24 17 17





Rate   


 


Blood Pressure 148/93 H 146/90 146/98 H


 


O2 Sat by Pulse 99 99 98





Oximetry   














  12/16/15 12/16/15 12/16/15





  10:00 11:59 12:00


 


Temperature   99.5 F


 


Pulse Rate 96 H 158 H 157 H


 


Respiratory 29 H  29 H





Rate   


 


Blood Pressure 167/106 H  136/72


 


O2 Sat by Pulse 97  97





Oximetry   














  12/16/15 12/16/15 12/16/15





  13:00 14:00 15:00


 


Temperature   


 


Pulse Rate 111 H 115 H 101 H


 


Respiratory 24 26 H 19





Rate   


 


Blood Pressure 121/75 138/90 131/76


 


O2 Sat by Pulse 100 98 100





Oximetry   














  12/16/15 12/16/15 12/16/15





  16:00 17:00 18:00


 


Temperature 99.5 F  


 


Pulse Rate 104 H 102 H 100 H


 


Respiratory 19 22 19





Rate   


 


Blood Pressure 138/88 163/100 H 145/88


 


O2 Sat by Pulse 100 99 99





Oximetry   











Intake and Output (last 12 hours): 


 Intake & Output











 12/16/15 12/16/15 12/17/15





 06:59 18:59 06:59


 


Intake Total 1795 1860 


 


Output Total  700 


 


Balance 1795 1160 


 


Intake:   


 


   900 


 


  Intake, Piggyback 250  


 


  Tube Feeding 650 660 


 


  Free Water Flush 200 300 


 


Output:   


 


  Urine  700 


 


    Urethral (Lua)  700 














- Medications


Medications: 


 Current Medications





Acetaminophen (Tylenol 650mg/20.3ml Solution Ud)  650 mg PO Q6 PRN


   PRN Reason: Fever


   Last Admin: 12/13/15 20:07 Dose:  650 mg


Acetaminophen (Tylenol 650 Mg Supp)  650 mg ME Q6 PRN


   PRN Reason: fevers


   Last Admin: 12/14/15 06:53 Dose:  650 mg


Aspirin (Ecotrin)  81 mg PO DAILY [facility]


   Last Admin: 12/16/15 08:46 Dose:  81 mg


Enalapril Maleate (Vasotec)  10 mg PO DAILY [facility]


   Last Admin: 12/16/15 08:47 Dose:  10 mg


Enoxaparin Sodium (Lovenox)  40 mg SC DAILY [facility]


   Last Admin: 12/16/15 08:46 Dose:  40 mg


Famotidine (Pepcid)  20 mg GT BID [facility]


   Last Admin: 12/16/15 16:10 Dose:  20 mg


Vancomycin HCl 1 gm/ Sodium (Chloride)  250 mls @ 125 mls/hr IVPB Q12 [facility]


   Last Admin: 12/16/15 10:38 Dose:  125 mls/hr


Ceftriaxone Sodium 2 gm/ (Sodium Chloride)  100 mls @ 100 mls/hr IVPB DAILY [facility]


   Last Admin: 12/16/15 08:46 Dose:  100 mls/hr


Propofol (Diprivan)  100 mls @ 4.082 mls/hr IV .Q24H [facility]; 10 MCG/KG/MIN


   PRN Reason: Protocol


   Stop: 12/17/15 02:11


   Last Admin: 12/16/15 02:14 Dose:  4.082 mls/hr


Sodium Chloride (Sodium Chloride 0.9%)  1,000 mls @ 75 mls/hr IV .[id number] [facility]


   Stop: 12/18/15 02:11


   Last Admin: 12/16/15 15:49 Dose:  75 mls/hr


Amiodarone HCl 450 mg/ (Dextrose)  259 mls @ 34.53 mls/hr IVPB .Q7H31M [facility]; 1 MG

/MIN


   PRN Reason: Protocol


   Last Admin: 12/16/15 14:06 Dose:  Not Given


Levetiracetam 500 mg/ Sodium (Chloride)  105 mls @ 210 mls/hr IVPB Q12 [facility]


Insulin Human Regular (Humulin R)  0 units SC ACHS [facility]


   PRN Reason: Protocol


   Last Admin: 12/16/15 16:34 Dose:  Not Given


Multivitamins/Vitamin C (Multi-Delyn Liquid)  5 ml PO DAILY [facility]


   Last Admin: 12/16/15 08:46 Dose:  5 ml


Nitroglycerin (Nitro-Bid 2% Oint)  2 in TOP Q6 [facility]


   Last Admin: 12/16/15 16:09 Dose:  2 in











- Labs


Labs (last 24 hours): 


 Laboratory Results - last 24 hr











  12/15/15 12/16/15 12/16/15





  21:50 04:10 05:28


 


WBC   9.4 


 


RBC   3.13 L 


 


Hgb   10.3 L 


 


Hct   32.5 L 


 


MCV   103.8 H 


 


MCH   33.0 H 


 


MCHC   31.8 L 


 


RDW   13.9 


 


Plt Count   303 


 


pCO2    41


 


pO2    93


 


HCO3    29.9 H


 


ABG pH    7.48 H


 


ABG Total CO2    31.8 H


 


ABG O2 Saturation    98.1 H


 


ABG O2 Content    19.2


 


ABG Base Excess    6.4 H


 


ABG Hemoglobin    14.2


 


ABG Carboxyhemoglobin    1.7 H


 


POC ABG HHb (Measured)    1.9


 


ABG Methemoglobin    0.7


 


ABG O2 Capacity    19.6


 


Chu Test    Yes


 


A-a O2 Difference    212.0


 


Hgb O2 Saturation    95.7


 


FiO2    50.0


 


Blood Gas Comments    333


 


Blood Gas Notified Time    543


 


Sodium   148 


 


Potassium   3.2 L 


 


Chloride   109 H 


 


Carbon Dioxide   30 


 


Anion Gap   12 


 


BUN   14 


 


Creatinine   1.0 


 


Est GFR ( Amer)   > 60 


 


Est GFR (Non-Af Amer)   > 60 


 


POC Glucose (mg/dL)  105  


 


Random Glucose   115 H 


 


Calcium   8.6 














  12/16/15 12/16/15





  06:45 10:44


 


WBC  


 


RBC  


 


Hgb  


 


Hct  


 


MCV  


 


MCH  


 


MCHC  


 


RDW  


 


Plt Count  


 


pCO2  


 


pO2  


 


HCO3  


 


ABG pH  


 


ABG Total CO2  


 


ABG O2 Saturation  


 


ABG O2 Content  


 


ABG Base Excess  


 


ABG Hemoglobin  


 


ABG Carboxyhemoglobin  


 


POC ABG HHb (Measured)  


 


ABG Methemoglobin  


 


ABG O2 Capacity  


 


Chu Test  


 


A-a O2 Difference  


 


Hgb O2 Saturation  


 


FiO2  


 


Blood Gas Comments  


 


Blood Gas Notified Time  


 


Sodium  


 


Potassium  


 


Chloride  


 


Carbon Dioxide  


 


Anion Gap  


 


BUN  


 


Creatinine  


 


Est GFR ( Amer)  


 


Est GFR (Non-Af Amer)  


 


POC Glucose (mg/dL)  102  104


 


Random Glucose  


 


Calcium  














- Constitutional


Appears: Toxic





- Head Exam


Head Exam: NORMAL INSPECTION





- Eye Exam


Eye Exam: Normal appearance





- ENT Exam


ENT Exam: Normal External Ear Exam





- Neck Exam


Neck exam: absent: Thyromegaly





- Respiratory Exam


Respiratory Exam: Decreased Breath Sounds





- Cardiovascular Exam


Cardiovascular Exam: REGULAR RHYTHM





- GI/Abdominal Exam


GI & Abdominal Exam: Normal Bowel Sounds





- Rectal Exam


Rectal Exam: Deferred





- Extremities Exam


Extremities exam: pedal edema





- Back Exam


Back exam: NORMAL INSPECTION





- Skin


Skin Exam: Normal Color





Assessment/Plan


(1) Cardiac arrest


Assessment and plan: 


unclear etiology.  Recommend serial cardiac enzymes.  Check troponin.  check 

echocardiogram. 





Patient has no current mental status.  I advise conservative medical therapy. 

No plans for cardiac cath.








Current Visit: Yes   Status: Acute





(2) NSTEMI (non-ST elevated myocardial infarction)


Assessment and plan: 


Patient may have underlying CAD as a cause of his acute event and cardiac 

arrest.  





Conservative therapy.


Current Visit: Yes   Status: Acute





(3) Ventricular tachycardia


Assessment and plan: 


if recurrence recommend amiodarone drip.


Current Visit: Yes   Status: Acute

## 2015-12-16 NOTE — CP.PCM.PN
Subjective





- Subjective


Subjective: 


new line being placed


interim events noted 


rx in progress


no new cultures 





Review of Systems





- Review of Systems


All systems: reviewed and no additional remarkable complaints except





- Constitutional


Constitutional: absent: Fever





Objective





- Vital Signs/Intake and Output


Vital Signs (last 24 hours): 


 Vital Signs - 24 hr











  12/15/15 12/15/15 12/15/15





  14:00 15:00 16:00


 


Temperature 100.7 F H 100.5 F H 100 F H


 


Pulse Rate 95 H 90 89


 


Respiratory 18 18 17





Rate   


 


Blood Pressure 133/86 138/86 141/87


 


O2 Sat by Pulse 97 97 98





Oximetry   














  12/15/15 12/15/15 12/15/15





  17:00 17:57 19:00


 


Temperature 100 F H 99.4 F 99.2 F


 


Pulse Rate 81 87 92 H


 


Respiratory 16 16 16





Rate   


 


Blood Pressure 130/88 130/88 116/113 H


 


O2 Sat by Pulse 100 100 98





Oximetry   














  12/15/15 12/15/15 12/15/15





  20:00 21:00 22:00


 


Temperature   


 


Pulse Rate 91 H 96 H 102 H


 


Respiratory 16 17 18





Rate   


 


Blood Pressure 162/103 H 160/101 H 157/98 H


 


O2 Sat by Pulse 98 99 97





Oximetry   














  12/15/15 12/16/15 12/16/15





  23:00 01:00 02:00


 


Temperature   


 


Pulse Rate 118 H 109 H 115 H


 


Respiratory 18 18 17





Rate   


 


Blood Pressure 141/103 H 147/92 H 143/90


 


O2 Sat by Pulse 97 97 96





Oximetry   














  12/16/15 12/16/15 12/16/15





  03:00 04:00 05:00


 


Temperature 100.9 F H 101 F H 


 


Pulse Rate 119 H 106 H 105 H


 


Respiratory 19 19 21





Rate   


 


Blood Pressure 133/89 141/95 H 164/108 H


 


O2 Sat by Pulse 99 97 100





Oximetry   














  12/16/15 12/16/15 12/16/15





  06:00 08:00 09:00


 


Temperature  99.7 F H 


 


Pulse Rate 97 H 99 H 95 H


 


Respiratory 24 17 17





Rate   


 


Blood Pressure 148/93 H 146/90 146/98 H


 


O2 Sat by Pulse 99 99 98





Oximetry   














  12/16/15 12/16/15





  10:00 12:00


 


Temperature  99.5 F


 


Pulse Rate 96 H 157 H


 


Respiratory 29 H 29 H





Rate  


 


Blood Pressure 167/106 H 136/72


 


O2 Sat by Pulse 97 97





Oximetry  











Intake and Output (last 12 hours): 


 Intake & Output











 12/15/15 12/16/15 12/16/15





 18:59 06:59 18:59


 


Intake Total 2145 1795 620


 


Output Total 500  


 


Balance 1645 1795 620


 


Intake:   


 


  IV 1005 695 300


 


  Intake, Piggyback 350 250 


 


  Tube Feeding 440 650 220


 


  Free Water Flush 350 200 100


 


Output:   


 


  Urine 500  


 


    Urethral (Lua) 500  














- Medications


Medications: 


 Current Medications





Acetaminophen (Tylenol 650mg/20.3ml Solution Ud)  650 mg PO Q6 PRN


   PRN Reason: Fever


   Last Admin: 12/13/15 20:07 Dose:  650 mg


Acetaminophen (Tylenol 650 Mg Supp)  650 mg UT Q6 PRN


   PRN Reason: fevers


   Last Admin: 12/14/15 06:53 Dose:  650 mg


Aspirin (Ecotrin)  81 mg PO DAILY [facility]


   Last Admin: 12/16/15 08:46 Dose:  81 mg


Enalapril Maleate (Vasotec)  10 mg PO DAILY [facility]


   Last Admin: 12/16/15 08:47 Dose:  10 mg


Enoxaparin Sodium (Lovenox)  40 mg SC DAILY [facility]


   Last Admin: 12/16/15 08:46 Dose:  40 mg


Famotidine (Pepcid)  20 mg GT BID [facility]


   Last Admin: 12/16/15 08:46 Dose:  20 mg


Vancomycin HCl 1 gm/ Sodium (Chloride)  250 mls @ 125 mls/hr IVPB Q12 [facility]


   Last Admin: 12/16/15 10:38 Dose:  125 mls/hr


Ceftriaxone Sodium 2 gm/ (Sodium Chloride)  100 mls @ 100 mls/hr IVPB DAILY [facility]


   Last Admin: 12/16/15 08:46 Dose:  100 mls/hr


Propofol (Diprivan)  100 mls @ 4.082 mls/hr IV .Q24H [facility]; 10 MCG/KG/MIN


   PRN Reason: Protocol


   Stop: 12/17/15 02:11


   Last Admin: 12/16/15 02:14 Dose:  4.082 mls/hr


Sodium Chloride (Sodium Chloride 0.9%)  1,000 mls @ 75 mls/hr IV .[id number] [facility]


   Stop: 12/18/15 02:11


   Last Admin: 12/16/15 02:16 Dose:  75 mls/hr


Amiodarone HCl 450 mg/ (Dextrose)  259 mls @ 34.53 mls/hr IVPB .Q7H31M [facility]; 1 MG

/MIN


   PRN Reason: Protocol


Levetiracetam 500 mg/ Sodium (Chloride)  105 mls @ 210 mls/hr IVPB Q12 [facility]


Insulin Human Regular (Humulin R)  0 units SC ACHS [facility]


   PRN Reason: Protocol


   Last Admin: 12/16/15 06:50 Dose:  Not Given


Multivitamins/Vitamin C (Multi-Delyn Liquid)  5 ml PO DAILY [facility]


   Last Admin: 12/16/15 08:46 Dose:  5 ml


Nitroglycerin (Nitro-Bid 2% Oint)  2 in TOP Q6 [facility]


   Last Admin: 12/16/15 10:38 Dose:  2 in











- Labs


Labs (last 24 hours): 


 Laboratory Results - last 24 hr











  12/15/15 12/15/15 12/16/15





  16:42 21:50 04:10


 


WBC    9.4


 


RBC    3.13 L


 


Hgb    10.3 L


 


Hct    32.5 L


 


MCV    103.8 H


 


MCH    33.0 H


 


MCHC    31.8 L


 


RDW    13.9


 


Plt Count    303


 


pCO2   


 


pO2   


 


HCO3   


 


ABG pH   


 


ABG Total CO2   


 


ABG O2 Saturation   


 


ABG O2 Content   


 


ABG Base Excess   


 


ABG Hemoglobin   


 


ABG Carboxyhemoglobin   


 


POC ABG HHb (Measured)   


 


ABG Methemoglobin   


 


ABG O2 Capacity   


 


Chu Test   


 


A-a O2 Difference   


 


Hgb O2 Saturation   


 


FiO2   


 


Blood Gas Comments   


 


Blood Gas Notified Time   


 


Sodium    148


 


Potassium    3.2 L


 


Chloride    109 H


 


Carbon Dioxide    30


 


Anion Gap    12


 


BUN    14


 


Creatinine    1.0


 


Est GFR ( Amer)    > 60


 


Est GFR (Non-Af Amer)    > 60


 


POC Glucose (mg/dL)  97  105 


 


Random Glucose    115 H


 


Calcium    8.6














  12/16/15 12/16/15 12/16/15





  05:28 06:45 10:44


 


WBC   


 


RBC   


 


Hgb   


 


Hct   


 


MCV   


 


MCH   


 


MCHC   


 


RDW   


 


Plt Count   


 


pCO2  41  


 


pO2  93  


 


HCO3  29.9 H  


 


ABG pH  7.48 H  


 


ABG Total CO2  31.8 H  


 


ABG O2 Saturation  98.1 H  


 


ABG O2 Content  19.2  


 


ABG Base Excess  6.4 H  


 


ABG Hemoglobin  14.2  


 


ABG Carboxyhemoglobin  1.7 H  


 


POC ABG HHb (Measured)  1.9  


 


ABG Methemoglobin  0.7  


 


ABG O2 Capacity  19.6  


 


Chu Test  Yes  


 


A-a O2 Difference  212.0  


 


Hgb O2 Saturation  95.7  


 


FiO2  50.0  


 


Blood Gas Comments  333  


 


Blood Gas Notified Time  543  


 


Sodium   


 


Potassium   


 


Chloride   


 


Carbon Dioxide   


 


Anion Gap   


 


BUN   


 


Creatinine   


 


Est GFR ( Amer)   


 


Est GFR (Non-Af Amer)   


 


POC Glucose (mg/dL)   102  104


 


Random Glucose   


 


Calcium   














Assessment/Plan


(1) Agitation


Current Visit: Yes   Status: Acute


Comment: cont propofol for agitation


unsure if this is lower brainstem activity vs seizure activity vs other etiology








(2) Aspiration pneumonia


Current Visit: Yes   Status: Acute


Comment: worsened cxr, cont anbx, ID condult, trach placement


tmax lower, on anbx








(3) Cardiac arrest


Current Visit: Yes   Status: Acute


Comment: s/p acls proceudres, ST w/ periods of SB


echo-noted


cardio consult


pt remains on propofol for agitation-to be weaned


hr remains elevated unsure if this is related to fever, agitation or other 

etiology


cath when stable as per cardio


s/p cooling protocol


no cardiac intervention planned at present








(4) Diabetes mellitus with hyperglycemia


Current Visit: Yes   Status: Acute


Comment: riss per protocol








(5) History of seizure


Current Visit: Yes   Status: Acute


Comment: neuro consult


?? eeg








(6) NSTEMI (non-ST elevated myocardial infarction)


Current Visit: Yes   Status: Acute

## 2015-12-17 LAB
ALBUMIN SERPL-MCNC: 2.7 G/DL (ref 3.5–5)
ALBUMIN/GLOB SERPL: 0.8 {RATIO} (ref 1–2.1)
ALT SERPL-CCNC: 37 U/L (ref 21–72)
ARTERIAL BLOOD GAS HEMOGLOBIN: 10.6 G/DL (ref 11.7–17.4)
ARTERIAL BLOOD GAS O2 SAT: 97 % (ref 95–98)
ARTERIAL BLOOD GAS PCO2: 40 MM/HG (ref 35–45)
ARTERIAL BLOOD GAS TCO2: 30.3 MMOL/L (ref 22–28)
AST SERPL-CCNC: 39 U/L (ref 17–59)
BASOPHILS # BLD AUTO: 0 K/UL (ref 0–0.2)
BASOPHILS NFR BLD: 0.4 % (ref 0–2)
BUN SERPL-MCNC: 16 MG/DL (ref 9–20)
CALCIUM SERPL-MCNC: 8.2 MG/DL (ref 8.4–10.2)
EOSINOPHIL # BLD AUTO: 0.5 K/UL (ref 0–0.7)
EOSINOPHIL NFR BLD: 5.4 % (ref 0–4)
ERYTHROCYTE [DISTWIDTH] IN BLOOD BY AUTOMATED COUNT: 13.8 % (ref 11.5–14.5)
GFR NON-AFRICAN AMERICAN: > 60
HCO3 BLDA-SCNC: 28.8 MMOL/L (ref 21–28)
HGB BLD-MCNC: 9.4 G/DL (ref 12–18)
INHALED O2 CONCENTRATION: 50 %
LACTATE BLDA-SCNC: 0.7 MMOL/L (ref 0.7–2.1)
LYMPHOCYTES # BLD AUTO: 1.1 K/UL (ref 1–4.3)
LYMPHOCYTES NFR BLD AUTO: 12.4 % (ref 20–40)
MCH RBC QN AUTO: 32.7 PG (ref 27–31)
MCHC RBC AUTO-ENTMCNC: 31.5 G/DL (ref 33–37)
MCV RBC AUTO: 103.7 FL (ref 80–94)
MONOCYTES # BLD: 0.8 K/UL (ref 0–0.8)
MONOCYTES NFR BLD: 9 % (ref 0–10)
NEUTROPHILS # BLD: 6.5 K/UL (ref 1.8–7)
NEUTROPHILS NFR BLD AUTO: 72.8 % (ref 50–75)
NRBC BLD AUTO-RTO: 0 % (ref 0–0)
O2 CAP BLDA-SCNC: 14.6 ML/DL (ref 16–24)
O2 CT BLDA-SCNC: 14.2 ML/DL (ref 15–23)
PH BLDA: 7.47 [PH] (ref 7.35–7.45)
PLATELET # BLD: 298 K/UL (ref 130–400)
PMV BLD AUTO: 7.6 FL (ref 7.2–11.7)
PO2 BLDA: 81 MM/HG (ref 80–100)
RBC # BLD AUTO: 2.87 MIL/UL (ref 4.4–5.9)
WBC # BLD AUTO: 8.9 K/UL (ref 4.8–10.8)

## 2015-12-17 RX ADMIN — POTASSIUM CHLORIDE SCH MLS/HR: 14.9 INJECTION, SOLUTION INTRAVENOUS at 11:22

## 2015-12-17 RX ADMIN — NITROGLYCERIN SCH IN: 20 OINTMENT TOPICAL at 22:52

## 2015-12-17 RX ADMIN — ENOXAPARIN SODIUM SCH MG: 40 INJECTION SUBCUTANEOUS at 09:20

## 2015-12-17 RX ADMIN — NITROGLYCERIN SCH IN: 20 OINTMENT TOPICAL at 17:18

## 2015-12-17 RX ADMIN — NITROGLYCERIN SCH IN: 20 OINTMENT TOPICAL at 05:00

## 2015-12-17 RX ADMIN — Medication SCH ML: at 09:20

## 2015-12-17 RX ADMIN — POTASSIUM CHLORIDE SCH MLS/HR: 14.9 INJECTION, SOLUTION INTRAVENOUS at 09:22

## 2015-12-17 RX ADMIN — IPRATROPIUM BROMIDE AND ALBUTEROL SULFATE SCH ML: .5; 3 SOLUTION RESPIRATORY (INHALATION) at 19:39

## 2015-12-17 RX ADMIN — POTASSIUM CHLORIDE SCH MLS/HR: 14.9 INJECTION, SOLUTION INTRAVENOUS at 17:20

## 2015-12-17 RX ADMIN — POTASSIUM CHLORIDE SCH MLS/HR: 14.9 INJECTION, SOLUTION INTRAVENOUS at 13:52

## 2015-12-17 RX ADMIN — NITROGLYCERIN SCH IN: 20 OINTMENT TOPICAL at 09:21

## 2015-12-17 RX ADMIN — POTASSIUM CHLORIDE SCH MLS/HR: 14.9 INJECTION, SOLUTION INTRAVENOUS at 12:30

## 2015-12-17 RX ADMIN — POTASSIUM CHLORIDE SCH MLS/HR: 14.9 INJECTION, SOLUTION INTRAVENOUS at 06:12

## 2015-12-17 NOTE — RAD
Indication: Day 10 MV



Comparison: Multiple prior chest x-rays, the most recent performed 

12/16/15 



Findings: 



Distal tip of the endotracheal tube terminates approximately 3 cm 

above the level of the ru. The side hole of the nasogastric tube 

terminates at the level of the GE junction and should be advanced 

approximately 3 cm. 



Bilateral pulmonary opacities appear worsened, however this may be 

exaggerated by hypoinflation. 



No large pleural effusion or definite pneumothorax appreciated. 



Degenerative changes of the spine and shoulders. 



Impression: 



Distal tip of the endotracheal tube terminates approximately 3 cm 

above the level of the ru. The side hole of the nasogastric tube 

terminates at the level of the GE junction and should be advanced 

approximately 3 cm. 



Bilateral pulmonary opacities appear worsened, however this may be 

exaggerated by hypo inflation. 



Findings discussed with REID White on 12/17/15 at 11:21 a.m.

## 2015-12-17 NOTE — CP.PCM.PN
Subjective





- Subjective


Subjective: 


pt sedated on vent, pt opening eyes to command.  when pt was removed from 

sedation able to tolerate 3h of cpap then suffered vt and seizure.  placed on 

keppra nd amiodarone.  nsr at [facility]. b/w noted. k supplementation given by 

hospitalist. 





Review of Systems





- Review of Systems


Systems not reviewed;Unavailable: Intubated





- Constitutional


Constitutional: UN





- EENT


Eyes: UNREMARKABLE


Ears: UNREMARKABLE


Nose/Mouth/Throat: UNREMARKABLE





- Cardiovascular


Cardiovascular: UNREMARKABLE





- Respiratory


Respiratory: UNREMARKABLE





- Gastrointestinal


Gastrointestinal: UNREMARKABLE





- Genitourinary


Genitourinary: UNREMARKABLE





- Reproductive: Male


Reproductive:Male: UNREMARKABLE





- Musculoskeletal


Musculoskeletal: UNREMARKABLE





- Integumentary


Integumentary: UNREMARKABLE





- Neurological


Neurological: UNREMARKABLE





- Psychiatric


Psychiatric: UNREMARKABLE





- Endocrine


Endocrine: UNREMARKABLE





- Hematologic/Lymphatic


Hematologic: UNREMARKABLE





Objective





- Vital Signs/Intake and Output


Vital Signs (last 24 hours): 


 Vital Signs - 24 hr











  12/16/15 12/16/15 12/16/15





  09:00 10:00 11:59


 


Temperature   


 


Pulse Rate 95 H 96 H 158 H


 


Respiratory 17 29 H 





Rate   


 


Blood Pressure 146/98 H 167/106 H 


 


O2 Sat by Pulse 98 97 





Oximetry   














  12/16/15 12/16/15 12/16/15





  12:00 13:00 14:00


 


Temperature 99.5 F  


 


Pulse Rate 157 H 111 H 115 H


 


Respiratory 29 H 24 26 H





Rate   


 


Blood Pressure 136/72 121/75 138/90


 


O2 Sat by Pulse 97 100 98





Oximetry   














  12/16/15 12/16/15 12/16/15





  15:00 16:00 17:00


 


Temperature  99.5 F 


 


Pulse Rate 101 H 104 H 102 H


 


Respiratory 19 19 22





Rate   


 


Blood Pressure 131/76 138/88 163/100 H


 


O2 Sat by Pulse 100 100 99





Oximetry   














  12/16/15 12/16/15 12/16/15





  18:00 19:00 20:00


 


Temperature  100.3 F H 101.5 F H


 


Pulse Rate 100 H 99 H 99 H


 


Respiratory 19 17 16





Rate   


 


Blood Pressure 145/88 147/58 L 159/99 H


 


O2 Sat by Pulse 99 100 100





Oximetry   














  12/16/15 12/16/15 12/16/15





  21:00 22:00 22:37


 


Temperature 100.9 F H 100.3 F H 100.8 F H


 


Pulse Rate 95 H 97 H 94 H


 


Respiratory 18 18 17





Rate   


 


Blood Pressure 148/99 H 150/89 149/91 H


 


O2 Sat by Pulse 100 100 100





Oximetry   














  12/16/15 12/17/15 12/17/15





  23:58 01:00 02:00


 


Temperature 101.0 F H 100.3 F H 100.3 F H


 


Pulse Rate 92 H 92 H 88


 


Respiratory 16 15 18





Rate   


 


Blood Pressure 150/69 150/83 133/83


 


O2 Sat by Pulse 98 99 99





Oximetry   














  12/17/15 12/17/15 12/17/15





  03:00 04:00 05:00


 


Temperature 100.3 F H 100.1 F H 99.9 F H


 


Pulse Rate 85 90 94 H


 


Respiratory 16 16 16





Rate   


 


Blood Pressure 136/82 129/77 133/76


 


O2 Sat by Pulse 100 99 99





Oximetry   














  12/17/15 12/17/15 12/17/15





  05:43 06:48 08:00


 


Temperature 99.8 F H 100.0 F H 99.8 F H


 


Pulse Rate 95 H 90 88


 


Respiratory 17 16 14





Rate   


 


Blood Pressure 142/87 146/88 149/86


 


O2 Sat by Pulse 100 100 99





Oximetry   











Intake and Output (last 12 hours): 


 Intake & Output











 12/16/15 12/17/15 12/17/15





 18:59 06:59 18:59


 


Intake Total 1860 2002 


 


Output Total 700 610 


 


Balance 1160 1392 


 


Intake:   


 


   876 


 


  Intake, Piggyback  286 


 


  Oral  210 


 


  Tube Feeding 660 330 


 


  Free Water Flush 300 300 


 


Output:   


 


  Urine 700 580 


 


    Urethral (Mcgee) 700 580 


 


  Stool  30 














- Medications


Medications: 


 Current Medications





Acetaminophen (Tylenol 650mg/20.3ml Solution Ud)  650 mg PO Q6 PRN


   PRN Reason: Fever


   Last Admin: 12/16/15 22:56 Dose:  650 mg


Acetaminophen (Tylenol 650 Mg Supp)  650 mg GA Q6 PRN


   PRN Reason: fevers


   Last Admin: 12/14/15 06:53 Dose:  650 mg


Aspirin (Ecotrin)  81 mg PO DAILY [facility]


   Last Admin: 12/16/15 08:46 Dose:  81 mg


Enalapril Maleate (Vasotec)  10 mg PO DAILY [facility]


   Last Admin: 12/16/15 08:47 Dose:  10 mg


Enoxaparin Sodium (Lovenox)  40 mg SC DAILY [facility]


   Last Admin: 12/16/15 08:46 Dose:  40 mg


Famotidine (Pepcid)  20 mg GT BID [facility]


   Last Admin: 12/16/15 16:10 Dose:  20 mg


Vancomycin HCl 1 gm/ Sodium (Chloride)  250 mls @ 125 mls/hr IVPB Q12 [facility]


   Last Admin: 12/16/15 21:16 Dose:  125 mls/hr


Ceftriaxone Sodium 2 gm/ (Sodium Chloride)  100 mls @ 100 mls/hr IVPB DAILY [facility]


   Last Admin: 12/16/15 08:46 Dose:  100 mls/hr


Sodium Chloride (Sodium Chloride 0.9%)  1,000 mls @ 75 mls/hr IV .[id number] [facility]


   Stop: 12/18/15 02:11


   Last Admin: 12/17/15 05:40 Dose:  75 mls/hr


Levetiracetam 500 mg/ Sodium (Chloride)  105 mls @ 210 mls/hr IVPB Q12 [facility]


   Last Admin: 12/16/15 21:14 Dose:  210 mls/hr


Potassium Chloride (Potassium Cl 10meq/50ml Sterile Water)  50 mls @ 50 mls/hr 

IVPB Q1 [facility]


   Stop: 12/17/15 11:59


   Last Admin: 12/17/15 06:12 Dose:  50 mls/hr


Insulin Human Regular (Humulin R)  0 units SC ACHS [facility]


   PRN Reason: Protocol


   Last Admin: 12/16/15 21:46 Dose:  Not Given


Multivitamins/Vitamin C (Multi-Delyn Liquid)  5 ml PO DAILY [facility]


   Last Admin: 12/16/15 08:46 Dose:  5 ml


Nitroglycerin (Nitro-Bid 2% Oint)  2 in TOP Q6 [facility]


   Last Admin: 12/17/15 05:00 Dose:  2 in











- Labs


Labs (last 24 hours): 


 Laboratory Results - last 24 hr











  12/16/15 12/16/15 12/16/15





  10:44 16:31 21:46


 


WBC   


 


RBC   


 


Hgb   


 


Hct   


 


MCV   


 


MCH   


 


MCHC   


 


RDW   


 


Plt Count   


 


MPV   


 


Neut % (Auto)   


 


Lymph % (Auto)   


 


Mono % (Auto)   


 


Eos % (Auto)   


 


Baso % (Auto)   


 


Neut #   


 


Lymph #   


 


Mono #   


 


Eos #   


 


Baso #   


 


pCO2   


 


pO2   


 


HCO3   


 


ABG pH   


 


ABG Total CO2   


 


ABG O2 Saturation   


 


ABG O2 Content   


 


ABG Base Excess   


 


ABG Hemoglobin   


 


ABG Carboxyhemoglobin   


 


POC ABG HHb (Measured)   


 


ABG Methemoglobin   


 


ABG O2 Capacity   


 


ABG Lactate   


 


A-a O2 Difference   


 


Hgb O2 Saturation   


 


FiO2   


 


Blood Gas Comments   


 


Blood Gas Notified Time   


 


Sodium   


 


Potassium   


 


Chloride   


 


Carbon Dioxide   


 


Anion Gap   


 


BUN   


 


Creatinine   


 


Est GFR ( Amer)   


 


Est GFR (Non-Af Amer)   


 


POC Glucose (mg/dL)  104  100  102


 


Random Glucose   


 


Calcium   


 


Total Bilirubin   


 


AST   


 


ALT   


 


Alkaline Phosphatase   


 


Total Protein   


 


Albumin   


 


Globulin   


 


Albumin/Globulin Ratio   


 


Vancomycin Trough   














  12/17/15 12/17/15 12/17/15





  04:00 05:20 05:53


 


WBC  8.9  


 


RBC  2.87 L  


 


Hgb  9.4 L  


 


Hct  29.8 L  


 


MCV  103.7 H  


 


MCH  32.7 H  


 


MCHC  31.5 L  


 


RDW  13.8  


 


Plt Count  298  


 


MPV  7.6  


 


Neut % (Auto)  72.8  


 


Lymph % (Auto)  12.4 L  


 


Mono % (Auto)  9.0  


 


Eos % (Auto)  5.4 H  


 


Baso % (Auto)  0.4  


 


Neut #  6.5  


 


Lymph #  1.1  


 


Mono #  0.8  


 


Eos #  0.5  


 


Baso #  0.0  


 


pCO2   40 


 


pO2   81 


 


HCO3   28.8 H 


 


ABG pH   7.47 H 


 


ABG Total CO2   30.3 H 


 


ABG O2 Saturation   97.0 


 


ABG O2 Content   14.2 L 


 


ABG Base Excess   5.0 H 


 


ABG Hemoglobin   10.6 L 


 


ABG Carboxyhemoglobin   1.6 H 


 


POC ABG HHb (Measured)   2.9 


 


ABG Methemoglobin   0.6 


 


ABG O2 Capacity   14.6 L 


 


ABG Lactate   0.7 


 


A-a O2 Difference   226.0 


 


Hgb O2 Saturation   94.9 L 


 


FiO2   50.0 


 


Blood Gas Comments   333 


 


Blood Gas Notified Time   547 


 


Sodium  147  


 


Potassium  3.0 L  


 


Chloride  110 H  


 


Carbon Dioxide  27  


 


Anion Gap  13  


 


BUN  16  


 


Creatinine  1.1  


 


Est GFR ( Amer)  > 60  


 


Est GFR (Non-Af Amer)  > 60  


 


POC Glucose (mg/dL)    105


 


Random Glucose  104  


 


Calcium  8.2 L  


 


Total Bilirubin  0.5  


 


AST  39  


 


ALT  37  


 


Alkaline Phosphatase  110  


 


Total Protein  6.0 L  


 


Albumin  2.7 L  


 


Globulin  3.3  


 


Albumin/Globulin Ratio  0.8 L  


 


Vancomycin Trough  14.2 H  














- Constitutional


Appears: Non-toxic, No Acute Distress, Chronically Ill





- Head Exam


Head Exam: ATRAUMATIC, NORMAL INSPECTION, NORMOCEPHALIC





- Respiratory Exam


Respiratory Exam: Clear to PA & Lateral


Additional comments: 


vented





- Cardiovascular Exam


Cardiovascular Exam: REGULAR RHYTHM, RRR





- GI/Abdominal Exam


GI & Abdominal Exam: Normal Bowel Sounds, Soft, Unremarkable





- Extremities Exam


Extremities exam: full ROM, normal capillary refill, normal inspection, pedal 

pulses present





- Neurological Exam


Additional comments: 


opens eyes and moves ext to command





- Skin


Skin Exam: Dry, Intact, Normal Color, Warm





Assessment/Plan


(1) Cardiac arrest


Current Visit: Yes   Status: Acute


Comment: s/p acls proceudres, ST w/ periods of SB


echo-noted


cardio consult


pt remains on propofol for agitation-to be weaned


hr remains elevated unsure if this is related to fever, agitation or other 

etiology


cath when stable as per cardio


s/p cooling protocol


another run of VT yesterday, given amiodarone 


no cardiac intervention planned at present








(2) DVT prophylaxis


Current Visit: Yes   Status: Acute


Comment: lovenox








(3) Scrotal edema


Current Visit: Yes   Status: Acute


Comment: scrotal us-noted


urology consult-dr cortes to see pt when stable








(4) History of seizure


Current Visit: Yes   Status: Acute


Comment: neuro consult


?? eeg


started on keppra








(5) Diabetes mellitus with hyperglycemia


Current Visit: Yes   Status: Acute


Comment: riss per protocol








(6) Agitation


Current Visit: Yes   Status: Acute


Comment: cont propofol for agitation


unsure if this is lower brainstem activity vs seizure activity vs other etiology








(7) UTI (urinary tract infection)


Current Visit: Yes   Status: Acute


Comment: cont anbx


mcgee cath


uro consult pending








(8) Aspiration pneumonia


Current Visit: Yes   Status: Acute


Comment: worsened cxr, cont anbx, ID condult, trach placement


tmax lower, on anbx











- Assessment and Plan (Free Text)


Assessment: 


able to tolerate cpap, cont weaning per icu

## 2015-12-17 NOTE — CP.PCM.PN
Subjective





- Subjective


Subjective: 


EVENTS NOTED


AFEBRILE ON IV RX


PROGNOSIS REMAINS POOR 





Objective





- Vital Signs/Intake and Output


Vital Signs (last 24 hours): 


 Vital Signs - 24 hr











  12/16/15 12/16/15 12/16/15





  20:00 21:00 22:00


 


Temperature 101.5 F H 100.9 F H 100.3 F H


 


Pulse Rate 99 H 95 H 97 H


 


Respiratory 16 18 18





Rate   


 


Blood Pressure 159/99 H 148/99 H 150/89


 


O2 Sat by Pulse 100 100 100





Oximetry   














  12/16/15 12/16/15 12/17/15





  22:37 23:58 01:00


 


Temperature 100.8 F H 101.0 F H 100.3 F H


 


Pulse Rate 94 H 92 H 92 H


 


Respiratory 17 16 15





Rate   


 


Blood Pressure 149/91 H 150/69 150/83


 


O2 Sat by Pulse 100 98 99





Oximetry   














  12/17/15 12/17/15 12/17/15





  02:00 03:00 04:00


 


Temperature 100.3 F H 100.3 F H 100.1 F H


 


Pulse Rate 88 85 90


 


Respiratory 18 16 16





Rate   


 


Blood Pressure 133/83 136/82 129/77


 


O2 Sat by Pulse 99 100 99





Oximetry   














  12/17/15 12/17/15 12/17/15





  05:00 05:43 06:48


 


Temperature 99.9 F H 99.8 F H 100.0 F H


 


Pulse Rate 94 H 95 H 90


 


Respiratory 16 17 16





Rate   


 


Blood Pressure 133/76 142/87 146/88


 


O2 Sat by Pulse 99 100 100





Oximetry   














  12/17/15 12/17/15 12/17/15





  08:00 10:32 12:00


 


Temperature 99.8 F H  98 F


 


Pulse Rate 88  90


 


Respiratory 14  14





Rate   


 


Blood Pressure 149/86 140/84 144/89


 


O2 Sat by Pulse 99  100





Oximetry   














  12/17/15 12/17/15 12/17/15





  14:00 16:00 18:00


 


Temperature  98.8 F 


 


Pulse Rate 92 H 101 H 100 H


 


Respiratory 16 20 22





Rate   


 


Blood Pressure 144/87 156/109 H 158/102 H


 


O2 Sat by Pulse 100 99 99





Oximetry   











Intake and Output (last 12 hours): 


 Intake & Output











 12/17/15 12/17/15 12/18/15





 06:59 18:59 06:59


 


Intake Total 2002 1650 


 


Output Total 610 2200 


 


Balance 1392 -550 


 


Intake:   


 


   75 


 


  Intake, Piggyback 286 750 


 


  Oral 210  


 


  Tube Feeding 330 525 


 


  Free Water Flush 300 300 


 


Output:   


 


  Urine 580 2200 


 


    Urethral (Lua) 580 2200 


 


  Stool 30  














- Medications


Medications: 


 Current Medications





Acetaminophen (Tylenol 650mg/20.3ml Solution Ud)  650 mg PO Q6 PRN


   PRN Reason: Fever


   Last Admin: 12/16/15 22:56 Dose:  650 mg


Acetaminophen (Tylenol 650 Mg Supp)  650 mg [place] Q6 PRN


   PRN Reason: fevers


   Last Admin: 12/14/15 06:53 Dose:  650 mg


Albuterol/Ipratropium (Duoneb 3 Mg/0.5 Mg (3 Ml) Ud)  3 ml INH RQ6 [facility]


Aspirin (Ecotrin)  81 mg PO DAILY [facility]


   Last Admin: 12/17/15 09:19 Dose:  81 mg


Enalapril Maleate (Vasotec)  10 mg PO DAILY [facility]


   Last Admin: 12/17/15 09:24 Dose:  10 mg


Enoxaparin Sodium (Lovenox)  40 mg SC DAILY [facility]


   Last Admin: 12/17/15 09:20 Dose:  40 mg


Famotidine (Pepcid)  20 mg GT BID [facility]


   Last Admin: 12/17/15 17:19 Dose:  Not Given


Vancomycin HCl 1 gm/ Sodium (Chloride)  250 mls @ 125 mls/hr IVPB Q12 [facility]


   Last Admin: 12/17/15 09:24 Dose:  125 mls/hr


Ceftriaxone Sodium 2 gm/ (Sodium Chloride)  100 mls @ 100 mls/hr IVPB DAILY [facility]


   Last Admin: 12/17/15 09:23 Dose:  100 mls/hr


Levetiracetam 500 mg/ Sodium (Chloride)  105 mls @ 210 mls/hr IVPB Q12 [facility]


   Last Admin: 12/17/15 09:20 Dose:  210 mls/hr


Insulin Human Regular (Humulin R)  0 units SC ACHS [facility]


   PRN Reason: Protocol


   Last Admin: 12/17/15 17:18 Dose:  Not Given


Lorazepam (Ativan)  2 mg IVP Q4 PRN


   PRN Reason: Agitation


   Last Admin: 12/17/15 09:15 Dose:  2 mg


Morphine Sulfate (Morphine)  4 mg IVP Q4 PRN


   PRN Reason: Agitation


   Last Admin: 12/17/15 11:22 Dose:  4 mg


Multivitamins/Vitamin C (Multi-Delyn Liquid)  5 ml PO DAILY [facility]


   Last Admin: 12/17/15 09:20 Dose:  5 ml


Nitroglycerin (Nitro-Bid 2% Oint)  2 in TOP Q6 [facility]


   Last Admin: 12/17/15 17:18 Dose:  2 in











- Labs


Labs (last 24 hours): 


 Laboratory Results - last 24 hr











  12/16/15 12/16/15 12/17/15





  16:31 21:46 04:00


 


WBC    8.9


 


RBC    2.87 L


 


Hgb    9.4 L


 


Hct    29.8 L


 


MCV    103.7 H


 


MCH    32.7 H


 


MCHC    31.5 L


 


RDW    13.8


 


Plt Count    298


 


MPV    7.6


 


Neut % (Auto)    72.8


 


Lymph % (Auto)    12.4 L


 


Mono % (Auto)    9.0


 


Eos % (Auto)    5.4 H


 


Baso % (Auto)    0.4


 


Neut #    6.5


 


Lymph #    1.1


 


Mono #    0.8


 


Eos #    0.5


 


Baso #    0.0


 


pCO2   


 


pO2   


 


HCO3   


 


ABG pH   


 


ABG Total CO2   


 


ABG O2 Saturation   


 


ABG O2 Content   


 


ABG Base Excess   


 


ABG Hemoglobin   


 


ABG Carboxyhemoglobin   


 


POC ABG HHb (Measured)   


 


ABG Methemoglobin   


 


ABG O2 Capacity   


 


ABG Lactate   


 


A-a O2 Difference   


 


Hgb O2 Saturation   


 


FiO2   


 


Blood Gas Comments   


 


Blood Gas Notified Time   


 


Sodium    147


 


Potassium    3.0 L


 


Chloride    110 H


 


Carbon Dioxide    27


 


Anion Gap    13


 


BUN    16


 


Creatinine    1.1


 


Est GFR ( Amer)    > 60


 


Est GFR (Non-Af Amer)    > 60


 


POC Glucose (mg/dL)  100  102 


 


Random Glucose    104


 


Calcium    8.2 L


 


Total Bilirubin    0.5


 


AST    39


 


ALT    37


 


Alkaline Phosphatase    110


 


Troponin I   


 


Total Protein    6.0 L


 


Albumin    2.7 L


 


Globulin    3.3


 


Albumin/Globulin Ratio    0.8 L


 


Vancomycin Trough    14.2 H














  12/17/15 12/17/15 12/17/15





  05:20 05:53 10:37


 


WBC   


 


RBC   


 


Hgb   


 


Hct   


 


MCV   


 


MCH   


 


MCHC   


 


RDW   


 


Plt Count   


 


MPV   


 


Neut % (Auto)   


 


Lymph % (Auto)   


 


Mono % (Auto)   


 


Eos % (Auto)   


 


Baso % (Auto)   


 


Neut #   


 


Lymph #   


 


Mono #   


 


Eos #   


 


Baso #   


 


pCO2  40  


 


pO2  81  


 


HCO3  28.8 H  


 


ABG pH  7.47 H  


 


ABG Total CO2  30.3 H  


 


ABG O2 Saturation  97.0  


 


ABG O2 Content  14.2 L  


 


ABG Base Excess  5.0 H  


 


ABG Hemoglobin  10.6 L  


 


ABG Carboxyhemoglobin  1.6 H  


 


POC ABG HHb (Measured)  2.9  


 


ABG Methemoglobin  0.6  


 


ABG O2 Capacity  14.6 L  


 


ABG Lactate  0.7  


 


A-a O2 Difference  226.0  


 


Hgb O2 Saturation  94.9 L  


 


FiO2  50.0  


 


Blood Gas Comments  333  


 


Blood Gas Notified Time  547  


 


Sodium   


 


Potassium   


 


Chloride   


 


Carbon Dioxide   


 


Anion Gap   


 


BUN   


 


Creatinine   


 


Est GFR ( Amer)   


 


Est GFR (Non-Af Amer)   


 


POC Glucose (mg/dL)   105 


 


Random Glucose   


 


Calcium   


 


Total Bilirubin   


 


AST   


 


ALT   


 


Alkaline Phosphatase   


 


Troponin I    0.0720


 


Total Protein   


 


Albumin   


 


Globulin   


 


Albumin/Globulin Ratio   


 


Vancomycin Trough   














  12/17/15 12/17/15





  10:51 17:10


 


WBC  


 


RBC  


 


Hgb  


 


Hct  


 


MCV  


 


MCH  


 


MCHC  


 


RDW  


 


Plt Count  


 


MPV  


 


Neut % (Auto)  


 


Lymph % (Auto)  


 


Mono % (Auto)  


 


Eos % (Auto)  


 


Baso % (Auto)  


 


Neut #  


 


Lymph #  


 


Mono #  


 


Eos #  


 


Baso #  


 


pCO2  


 


pO2  


 


HCO3  


 


ABG pH  


 


ABG Total CO2  


 


ABG O2 Saturation  


 


ABG O2 Content  


 


ABG Base Excess  


 


ABG Hemoglobin  


 


ABG Carboxyhemoglobin  


 


POC ABG HHb (Measured)  


 


ABG Methemoglobin  


 


ABG O2 Capacity  


 


ABG Lactate  


 


A-a O2 Difference  


 


Hgb O2 Saturation  


 


FiO2  


 


Blood Gas Comments  


 


Blood Gas Notified Time  


 


Sodium  


 


Potassium  


 


Chloride  


 


Carbon Dioxide  


 


Anion Gap  


 


BUN  


 


Creatinine  


 


Est GFR ( Amer)  


 


Est GFR (Non-Af Amer)  


 


POC Glucose (mg/dL)  93  102


 


Random Glucose  


 


Calcium  


 


Total Bilirubin  


 


AST  


 


ALT  


 


Alkaline Phosphatase  


 


Troponin I  


 


Total Protein  


 


Albumin  


 


Globulin  


 


Albumin/Globulin Ratio  


 


Vancomycin Trough  














- Constitutional


Appears: Non-toxic, Chronically Ill





- Head Exam


Head Exam: absent: NORMOCEPHALIC





- Eye Exam


Eye Exam: absent: Scleral icterus





- ENT Exam


ENT Exam: Mucous Membranes Dry





- Neck Exam


Neck exam: absent: Lymphadenopathy





- Respiratory Exam


Respiratory Exam: Decreased Breath Sounds, Rhonchi





- Cardiovascular Exam


Cardiovascular Exam: REGULAR RHYTHM, +S1, +S2





- GI/Abdominal Exam


GI & Abdominal Exam: Normal Bowel Sounds, Soft.  absent: Tenderness





Assessment/Plan


(1) Agitation


Current Visit: Yes   Status: Acute


Comment: cont propofol for agitation


unsure if this is lower brainstem activity vs seizure activity vs other etiology








(2) Aspiration pneumonia


Current Visit: Yes   Status: Acute


Comment: worsened cxr, cont anbx, ID condult, trach placement


tmax lower, on anbx








(3) Cardiac arrest


Current Visit: Yes   Status: Acute


Comment: s/p acls proceudres, ST w/ periods of SB


echo-noted


cardio consult


pt remains on propofol for agitation-to be weaned


hr remains elevated unsure if this is related to fever, agitation or other 

etiology


cath when stable as per cardio


s/p cooling protocol


another run of VT yesterday, given amiodarone 


no cardiac intervention planned at present








(4) Diabetes mellitus with hyperglycemia


Current Visit: Yes   Status: Acute


Comment: riss per protocol








(5) History of seizure


Current Visit: Yes   Status: Acute


Comment: neuro consult


?? eeg


started on keppra








(6) NSTEMI (non-ST elevated myocardial infarction)


Current Visit: Yes   Status: Acute

## 2015-12-17 NOTE — CP.CCUPN
CCU Subjective





- Physician Review


Subjective (Free Text): 


***INTENSIVIST PROGRESS NOTE***


Patient examined, interim events reviewed:





No distress episodes overnight nor any further arrhythmias when sedated. Still 

occasionally diaphoretic. Intermittently noted to spontaneously opens eyes, but 

does not follow any commands, nor is interactive.  Remains on Propofol. No 

recurrent seizure activity noted as well. T 101.0F max overnight, 146/68, 90, 16

, 100% on 50% oxygen, Breathing 16 on AC 16. 24H I/Os = 3862/1310 ml.








EXAM-


HEENT: no icterus, no nystagmus, pupils 2-3 mm and reactive,  no gaze preference

,  no nystagmus


NECK: no visible JVD, supple, carotids equal upstroke bilat/no bruits


CHEST: decreased BS bases, no wheezes audible


HEART:  regular, distant, S1S2, no murmur audible, no rubs.


ABD:  soft, no increased distention, no focal tenderness,  no HSM. BS hypoactive

, 


EXT:    mild UE bilat edema, no peripheral/ digital cyanosis, no palpable cords

, distal pulses intact and symmetrical, SCDs on bilat. 


NEURO:  plantars no response. Otherwise flaccid legs. 


SKIN:   no rashes





LABS


WBC= 8.1


HGB= 9.4


PLTs = 298K


Na= 147


K=  3.0


HCO3= 27


BUN/Cr=  16/1.1


BS= 104


7.47/40/81


CXR: ETT position OK above ru, bilateral Left > Right  multi-lobular 

interstitial changes (my interp).





Assessment: 


1.   Cardiac Arrest with resuscitation from VT. 


2.   Post-Resuscitation Therapeutic Hypothermia-completed


3.   Anoxic Encephalopathy


4.     Aspiration Pneumonia


5.    H/o Seizure Disorder with recurrence








PLAN:


1.  Day # 10  on MV support, expect long term need for MV support, given slow 

to resolve aspiration pneumonia, and anoxic encephalopathy. Early trach, PEG 

and possible PICC still advised. TLC placed yesterday emergently. Ongoing 

attempts to locate next-of-kin noted ( 2 sisters).  In my opinion, Trach would 

be a life  saving procedure in this case, for the potential instance if patient 

ever self-extubates and we are unable to re-intubate in a timely manner, 

further anoxic injury or death may occur.  


2.  Coverage for Klebsiella, on Vanco/Rocephin.


3.  EEG, Keppra maintenance.


4.   K repletion, check Mg, Phos levels.


5.   Consider bedside PROM.

## 2015-12-18 LAB
ALBUMIN SERPL-MCNC: 2.9 G/DL (ref 3.5–5)
ALBUMIN/GLOB SERPL: 0.9 {RATIO} (ref 1–2.1)
ALT SERPL-CCNC: 33 U/L (ref 21–72)
ARTERIAL BLOOD GAS HEMOGLOBIN: 10.5 G/DL (ref 11.7–17.4)
ARTERIAL BLOOD GAS O2 SAT: 96 % (ref 95–98)
ARTERIAL BLOOD GAS PCO2: 40 MM/HG (ref 35–45)
ARTERIAL BLOOD GAS TCO2: 33.1 MMOL/L (ref 22–28)
ARTERIAL PATENCY WRIST A: YES
AST SERPL-CCNC: 34 U/L (ref 17–59)
BASOPHILS # BLD AUTO: 0.1 K/UL (ref 0–0.2)
BASOPHILS NFR BLD: 0.5 % (ref 0–2)
BUN SERPL-MCNC: 15 MG/DL (ref 9–20)
CALCIUM SERPL-MCNC: 8.3 MG/DL (ref 8.4–10.2)
EOSINOPHIL # BLD AUTO: 0.4 K/UL (ref 0–0.7)
EOSINOPHIL NFR BLD: 2.6 % (ref 0–4)
ERYTHROCYTE [DISTWIDTH] IN BLOOD BY AUTOMATED COUNT: 13.8 % (ref 11.5–14.5)
GFR NON-AFRICAN AMERICAN: > 60
HCO3 BLDA-SCNC: 31.3 MMOL/L (ref 21–28)
HGB BLD-MCNC: 9.8 G/DL (ref 12–18)
INHALED O2 CONCENTRATION: 50 %
LYMPHOCYTES # BLD AUTO: 1.5 K/UL (ref 1–4.3)
LYMPHOCYTES NFR BLD AUTO: 11.1 % (ref 20–40)
MCH RBC QN AUTO: 31.9 PG (ref 27–31)
MCHC RBC AUTO-ENTMCNC: 30.3 G/DL (ref 33–37)
MCV RBC AUTO: 105.4 FL (ref 80–94)
MONOCYTES # BLD: 1 K/UL (ref 0–0.8)
MONOCYTES NFR BLD: 7.4 % (ref 0–10)
NEUTROPHILS # BLD: 10.7 K/UL (ref 1.8–7)
NEUTROPHILS NFR BLD AUTO: 78.4 % (ref 50–75)
NRBC BLD AUTO-RTO: 0 % (ref 0–0)
O2 CAP BLDA-SCNC: 14.5 ML/DL (ref 16–24)
O2 CT BLDA-SCNC: 13.9 ML/DL (ref 15–23)
PH BLDA: 7.51 [PH] (ref 7.35–7.45)
PLATELET # BLD: 316 K/UL (ref 130–400)
PMV BLD AUTO: 7.8 FL (ref 7.2–11.7)
PO2 BLDA: 71 MM/HG (ref 80–100)
RBC # BLD AUTO: 3.06 MIL/UL (ref 4.4–5.9)
WBC # BLD AUTO: 13.7 K/UL (ref 4.8–10.8)

## 2015-12-18 RX ADMIN — ACETAMINOPHEN PRN MG: 160 SOLUTION ORAL at 02:28

## 2015-12-18 RX ADMIN — IPRATROPIUM BROMIDE AND ALBUTEROL SULFATE SCH ML: .5; 3 SOLUTION RESPIRATORY (INHALATION) at 19:00

## 2015-12-18 RX ADMIN — IPRATROPIUM BROMIDE AND ALBUTEROL SULFATE SCH ML: .5; 3 SOLUTION RESPIRATORY (INHALATION) at 13:17

## 2015-12-18 RX ADMIN — LEVETIRACETAM SCH MG: 100 SOLUTION ORAL at 17:00

## 2015-12-18 RX ADMIN — IPRATROPIUM BROMIDE AND ALBUTEROL SULFATE SCH ML: .5; 3 SOLUTION RESPIRATORY (INHALATION) at 08:13

## 2015-12-18 RX ADMIN — NITROGLYCERIN SCH IN: 20 OINTMENT TOPICAL at 16:27

## 2015-12-18 RX ADMIN — POTASSIUM CHLORIDE SCH MLS/HR: 14.9 INJECTION, SOLUTION INTRAVENOUS at 09:49

## 2015-12-18 RX ADMIN — NITROGLYCERIN SCH IN: 20 OINTMENT TOPICAL at 05:00

## 2015-12-18 RX ADMIN — IPRATROPIUM BROMIDE AND ALBUTEROL SULFATE SCH ML: .5; 3 SOLUTION RESPIRATORY (INHALATION) at 01:00

## 2015-12-18 RX ADMIN — LEVETIRACETAM SCH MG: 100 SOLUTION ORAL at 09:00

## 2015-12-18 RX ADMIN — NITROGLYCERIN SCH IN: 20 OINTMENT TOPICAL at 22:00

## 2015-12-18 RX ADMIN — ENOXAPARIN SODIUM SCH MG: 40 INJECTION SUBCUTANEOUS at 08:06

## 2015-12-18 RX ADMIN — POTASSIUM CHLORIDE SCH MLS/HR: 14.9 INJECTION, SOLUTION INTRAVENOUS at 05:54

## 2015-12-18 RX ADMIN — Medication SCH ML: at 08:07

## 2015-12-18 RX ADMIN — NITROGLYCERIN SCH IN: 20 OINTMENT TOPICAL at 10:00

## 2015-12-18 NOTE — CP.PCM.PN
Subjective





- Subjective


Subjective: 


pt off propofol during time of eval. opens eyes spontaneously and appears to 

attemtp to focus.  no fever today.  cxr appears worsened for aspiration pna.  

able to tolerate cpap/tvalve but questions exist about pts ability to maintain 

an airway. pending state guardianship





Review of Systems





- Review of Systems


Systems not reviewed;Unavailable: Intubated





- Constitutional


Constitutional: UN





- EENT


Eyes: UNREMARKABLE


Ears: UNREMARKABLE


Nose/Mouth/Throat: UNREMARKABLE





- Cardiovascular


Cardiovascular: UNREMARKABLE





- Respiratory


Respiratory: UNREMARKABLE





- Gastrointestinal


Gastrointestinal: UNREMARKABLE





- Genitourinary


Genitourinary: UNREMARKABLE





- Reproductive: Male


Reproductive:Male: UNREMARKABLE





- Musculoskeletal


Musculoskeletal: UNREMARKABLE





- Integumentary


Integumentary: UNREMARKABLE





- Neurological


Neurological: UNREMARKABLE





- Psychiatric


Psychiatric: UNREMARKABLE





- Endocrine


Endocrine: UNREMARKABLE





- Hematologic/Lymphatic


Hematologic: UNREMARKABLE





Objective





- Vital Signs/Intake and Output


Vital Signs (last 24 hours): 


 Vital Signs - 24 hr











  12/17/15 12/17/15 12/17/15





  18:00 20:00 21:23


 


Temperature  100.0 F H 


 


Pulse Rate 100 H 170 H 180 H


 


Respiratory 22 25 H 





Rate   


 


Blood Pressure 158/102 H 150/101 H 157/101 H


 


O2 Sat by Pulse 99 85 L 





Oximetry   














  12/17/15 12/18/15 12/18/15





  22:00 00:00 00:58


 


Temperature 100.3 F H  100.7 F H


 


Pulse Rate 100 H 93 H 


 


Respiratory 12 15 





Rate   


 


Blood Pressure 105/70 119/72 


 


O2 Sat by Pulse 100 100 





Oximetry   














  12/18/15 12/18/15 12/18/15





  02:00 03:17 06:00


 


Temperature 100.3 F H 100.5 F H 100.6 F H


 


Pulse Rate 93 H 91 H 91 H


 


Respiratory 14 17 16





Rate   


 


Blood Pressure 123/72 109/67 134/75


 


O2 Sat by Pulse 98 96 100





Oximetry   














  12/18/15 12/18/15 12/18/15





  08:00 08:05 10:00


 


Temperature 98.1 F  


 


Pulse Rate 86  88


 


Respiratory 18  12





Rate   


 


Blood Pressure 129/71 136/70 130/75


 


O2 Sat by Pulse 99  100





Oximetry   











Intake and Output (last 12 hours): 


 Intake & Output











 12/17/15 12/18/15 12/18/15





 18:59 06:59 18:59


 


Intake Total 1650 1244 


 


Output Total 2200 875 


 


Balance -550 369 


 


Intake:   


 


  IV 75 675 


 


  Intake, Piggyback 750 269 


 


  Tube Feeding 525 150 


 


  Free Water Flush 300  


 


  Other  150 


 


Output:   


 


  Gastric Amount  20 


 


    Stomach  20 


 


  Urine 2200 855 


 


    Urethral (Mcgee) 2200 855 














- Medications


Medications: 


 Current Medications





Acetaminophen (Tylenol 650mg/20.3ml Solution Ud)  650 mg PO Q6 PRN


   PRN Reason: Fever


   Last Admin: 12/18/15 02:28 Dose:  650 mg


Acetaminophen (Tylenol 650 Mg Supp)  650 mg [place] Q6 PRN


   PRN Reason: fevers


   Last Admin: 12/14/15 06:53 Dose:  650 mg


Albuterol/Ipratropium (Duoneb 3 Mg/0.5 Mg (3 Ml) Ud)  3 ml INH RQ6 [facility]


   Last Admin: 12/18/15 08:13 Dose:  3 ml


Albuterol/Ipratropium (Duoneb 3 Mg/0.5 Mg (3 Ml) Ud)  3 ml INH RQ6 [facility]


   Last Admin: 12/18/15 13:17 Dose:  3 ml


Aspirin (Ecotrin)  81 mg PO DAILY [facility]


   Last Admin: 12/18/15 08:05 Dose:  81 mg


Enalapril Maleate (Vasotec)  10 mg PO DAILY [facility]


   Last Admin: 12/18/15 08:07 Dose:  10 mg


Famotidine (Pepcid)  20 mg GT BID [facility]


   Last Admin: 12/18/15 08:07 Dose:  20 mg


Vancomycin HCl 1 gm/ Sodium (Chloride)  250 mls @ 125 mls/hr IVPB Q12 [facility]


   Last Admin: 12/17/15 22:00 Dose:  125 mls/hr


Insulin Human Regular (Humulin R)  0 units SC ACHS [facility]


   PRN Reason: Protocol


   Last Admin: 12/18/15 06:48 Dose:  Not Given


Levetiracetam (Keppra)  500 mg PO BID [facility]


Lorazepam (Ativan)  2 mg IVP Q4 PRN


   PRN Reason: Agitation


   Last Admin: 12/17/15 21:00 Dose:  2 mg


Morphine Sulfate (Morphine)  2 mg IVP Q4 PRN


   PRN Reason: Pain, moderate (4-7)


   Last Admin: 12/17/15 20:24 Dose:  2 mg


Multivitamins/Vitamin C (Multi-Delyn Liquid)  5 ml PO DAILY [facility]


   Last Admin: 12/18/15 08:07 Dose:  5 ml


Nitroglycerin (Nitro-Bid 2% Oint)  2 in TOP Q6 [facility]


   Last Admin: 12/18/15 05:00 Dose:  2 in











- Labs


Labs (last 24 hours): 


 Laboratory Results - last 24 hr











  12/17/15 12/17/15 12/18/15





  17:10 22:05 04:05


 


WBC    13.7 H D


 


RBC    3.06 L


 


Hgb    9.8 L


 


Hct    32.3 L


 


MCV    105.4 H


 


MCH    31.9 H


 


MCHC    30.3 L


 


RDW    13.8


 


Plt Count    316


 


MPV    7.8


 


Neut % (Auto)    78.4 H


 


Lymph % (Auto)    11.1 L


 


Mono % (Auto)    7.4


 


Eos % (Auto)    2.6


 


Baso % (Auto)    0.5


 


Neut #    10.7 H


 


Lymph #    1.5


 


Mono #    1.0 H


 


Eos #    0.4


 


Baso #    0.1


 


pCO2   


 


pO2   


 


HCO3   


 


ABG pH   


 


ABG Total CO2   


 


ABG O2 Saturation   


 


ABG O2 Content   


 


ABG Base Excess   


 


ABG Hemoglobin   


 


ABG Carboxyhemoglobin   


 


POC ABG HHb (Measured)   


 


ABG Methemoglobin   


 


ABG O2 Capacity   


 


Chu Test   


 


A-a O2 Difference   


 


Hgb O2 Saturation   


 


FiO2   


 


Blood Gas Comments   


 


Blood Gas Notified Time   


 


Sodium    146


 


Potassium    3.4 L


 


Chloride    106


 


Carbon Dioxide    31 H


 


Anion Gap    12


 


BUN    15


 


Creatinine    1.1


 


Est GFR ( Amer)    > 60


 


Est GFR (Non-Af Amer)    > 60


 


POC Glucose (mg/dL)  102  129 H 


 


Random Glucose    94


 


Calcium    8.3 L


 


Phosphorus    3.2


 


Magnesium    2.1


 


Total Bilirubin    0.5


 


AST    34


 


ALT    33


 


Alkaline Phosphatase    98


 


Total Protein    6.3


 


Albumin    2.9 L


 


Globulin    3.4


 


Albumin/Globulin Ratio    0.9 L














  12/18/15 12/18/15





  05:35 05:59


 


WBC  


 


RBC  


 


Hgb  


 


Hct  


 


MCV  


 


MCH  


 


MCHC  


 


RDW  


 


Plt Count  


 


MPV  


 


Neut % (Auto)  


 


Lymph % (Auto)  


 


Mono % (Auto)  


 


Eos % (Auto)  


 


Baso % (Auto)  


 


Neut #  


 


Lymph #  


 


Mono #  


 


Eos #  


 


Baso #  


 


pCO2  40 


 


pO2  71 L 


 


HCO3  31.3 H 


 


ABG pH  7.51 H 


 


ABG Total CO2  33.1 H 


 


ABG O2 Saturation  96.0 


 


ABG O2 Content  13.9 L 


 


ABG Base Excess  8.2 H 


 


ABG Hemoglobin  10.5 L 


 


ABG Carboxyhemoglobin  1.6 H 


 


POC ABG HHb (Measured)  3.9 


 


ABG Methemoglobin  0.8 


 


ABG O2 Capacity  14.5 L 


 


Chu Test  Yes 


 


A-a O2 Difference  236.0 


 


Hgb O2 Saturation  93.7 L 


 


FiO2  50.0 


 


Blood Gas Comments  333 


 


Blood Gas Notified Time  545 


 


Sodium  


 


Potassium  


 


Chloride  


 


Carbon Dioxide  


 


Anion Gap  


 


BUN  


 


Creatinine  


 


Est GFR ( Amer)  


 


Est GFR (Non-Af Amer)  


 


POC Glucose (mg/dL)   87


 


Random Glucose  


 


Calcium  


 


Phosphorus  


 


Magnesium  


 


Total Bilirubin  


 


AST  


 


ALT  


 


Alkaline Phosphatase  


 


Total Protein  


 


Albumin  


 


Globulin  


 


Albumin/Globulin Ratio  














- Constitutional


Appears: Non-toxic, No Acute Distress, Chronically Ill





- Head Exam


Head Exam: ATRAUMATIC, NORMAL INSPECTION, NORMOCEPHALIC





- Eye Exam


Eye Exam: EOMI





- Respiratory Exam


Respiratory Exam: Clear to PA & Lateral


Additional comments: 


vented at present, few breaths over vent noted





- Cardiovascular Exam


Cardiovascular Exam: REGULAR RHYTHM, RRR





- GI/Abdominal Exam


GI & Abdominal Exam: Normal Bowel Sounds, Soft, Unremarkable





- Extremities Exam


Extremities exam: full ROM, normal capillary refill, normal inspection, pedal 

pulses present





- Back Exam


Back exam: FULL ROM





- Skin


Skin Exam: Dry, Intact, Normal Color, Warm





Assessment/Plan


(1) Cardiac arrest


Current Visit: Yes   Status: Acute


Comment: s/p acls proceudres, ST w/ periods of SB


echo-noted


cardio consult


pt remains on propofol for agitation-to be weaned-d/c today


hr remains elevated unsure if this is related to fever, agitation or other 

etiology


cath when stable as per cardio


s/p cooling protocol


another run of VT yesterday, given amiodarone -no further vt


no cardiac intervention planned at present








(2) DVT prophylaxis


Current Visit: Yes   Status: Acute


Comment: lovenox








(3) Scrotal edema


Current Visit: Yes   Status: Acute


Comment: scrotal us-noted


urology consult-dr cortes to see pt when stable








(4) History of seizure


Current Visit: Yes   Status: Acute


Comment: neuro consult


?? eeg


started on keppra








(5) Diabetes mellitus with hyperglycemia


Current Visit: Yes   Status: Acute


Comment: riss per protocol








(6) Agitation


Current Visit: Yes   Status: Acute


Comment: cont propofol for agitation


unsure if this is lower brainstem activity vs seizure activity vs other etiology


now on ativan/morephin prn, propofol d/c








(7) UTI (urinary tract infection)


Current Visit: Yes   Status: Acute


Comment: cont anbx


mcgee cath


uro consult pending








(8) Aspiration pneumonia


Current Visit: Yes   Status: Acute


Comment: worsened cxr, cont anbx, ID condult, trach placement


tmax lower, on anbx


pending guardianship for trach placement











- Assessment and Plan (Free Text)


Assessment: 


cont weaning off vent as per icu guidelines

## 2015-12-18 NOTE — CP.PCM.PN
Subjective





- Subjective


Subjective: 


Patient has opened his eyes.   remains intubated.





Review of Systems





- Review of Systems


Systems not reviewed;Unavailable: Intubated





Objective





- Vital Signs/Intake and Output


Vital Signs (last 24 hours): 


 Vital Signs - 24 hr











  12/17/15 12/18/15 12/18/15





  22:00 00:00 00:58


 


Temperature 100.3 F H  100.7 F H


 


Pulse Rate 100 H 93 H 


 


Respiratory 12 15 





Rate   


 


Blood Pressure 105/70 119/72 


 


O2 Sat by Pulse 100 100 





Oximetry   














  12/18/15 12/18/15 12/18/15





  02:00 03:17 06:00


 


Temperature 100.3 F H 100.5 F H 100.6 F H


 


Pulse Rate 93 H 91 H 91 H


 


Respiratory 14 17 16





Rate   


 


Blood Pressure 123/72 109/67 134/75


 


O2 Sat by Pulse 98 96 100





Oximetry   














  12/18/15 12/18/15 12/18/15





  08:00 08:05 10:00


 


Temperature 98.1 F  


 


Pulse Rate 86  88


 


Respiratory 18  12





Rate   


 


Blood Pressure 129/71 136/70 130/75


 


O2 Sat by Pulse 99  100





Oximetry   














  12/18/15 12/18/15 12/18/15





  16:00 18:00 20:00


 


Temperature 99.0 F  100.2 F H


 


Pulse Rate 91 H 95 H 97 H


 


Respiratory   18





Rate   


 


Blood Pressure 156/95 H 157/89 H 162/53 H


 


O2 Sat by Pulse 100 97 100





Oximetry   











Intake and Output (last 12 hours): 


 Intake & Output











 12/18/15 12/18/15 12/19/15





 06:59 18:59 06:59


 


Intake Total 1244  250


 


Output Total 875 2100 


 


Balance 369 -2100 250


 


Intake:   


 


    


 


  Intake, Piggyback 269  250


 


  Tube Feeding 150  


 


  Other 150  


 


Output:   


 


  Gastric Amount 20  


 


    Stomach 20  


 


  Urine 855 2100 


 


    Urethral (Lua) 855 2100 














- Medications


Medications: 


 Current Medications





Acetaminophen (Tylenol 650mg/20.3ml Solution Ud)  650 mg PO Q6 PRN


   PRN Reason: Fever


   Last Admin: 12/18/15 02:28 Dose:  650 mg


Acetaminophen (Tylenol 650 Mg Supp)  650 mg MN Q6 PRN


   PRN Reason: fevers


   Last Admin: 12/14/15 06:53 Dose:  650 mg


Albuterol/Ipratropium (Duoneb 3 Mg/0.5 Mg (3 Ml) Ud)  3 ml INH RQ6 [facility]


   Last Admin: 12/18/15 19:00 Dose:  3 ml


Albuterol/Ipratropium (Duoneb 3 Mg/0.5 Mg (3 Ml) Ud)  3 ml INH RQ6 [facility]


   Last Admin: 12/18/15 13:17 Dose:  3 ml


Aspirin (Ecotrin)  81 mg PO DAILY [facility]


   Last Admin: 12/18/15 08:05 Dose:  81 mg


Enalapril Maleate (Vasotec)  10 mg PO DAILY [facility]


   Last Admin: 12/18/15 08:07 Dose:  10 mg


Famotidine (Pepcid)  20 mg GT BID [facility]


   Last Admin: 12/18/15 16:27 Dose:  20 mg


Vancomycin HCl 1 gm/ Sodium (Chloride)  250 mls @ 125 mls/hr IVPB Q12 [facility]


   Last Admin: 12/18/15 20:46 Dose:  125 mls/hr


Insulin Human Regular (Humulin R)  0 units SC ACHS [facility]


   PRN Reason: Protocol


   Last Admin: 12/18/15 16:30 Dose:  Not Given


Levetiracetam (Keppra)  500 mg PO BID [facility]


   Last Admin: 12/18/15 17:00 Dose:  500 mg


Lorazepam (Ativan)  2 mg IVP Q4 PRN


   PRN Reason: Agitation


   Last Admin: 12/18/15 18:15 Dose:  2 mg


Morphine Sulfate (Morphine)  2 mg IVP Q4 PRN


   PRN Reason: Pain, moderate (4-7)


   Last Admin: 12/18/15 17:35 Dose:  2 mg


Multivitamins/Vitamin C (Multi-Delyn Liquid)  5 ml PO DAILY [facility]


   Last Admin: 12/18/15 08:07 Dose:  5 ml


Nitroglycerin (Nitro-Bid 2% Oint)  2 in TOP Q6 [facility]


   Last Admin: 12/18/15 16:27 Dose:  2 in











- Labs


Labs (last 24 hours): 


 Laboratory Results - last 24 hr











  12/17/15 12/18/15 12/18/15





  22:05 04:05 05:35


 


WBC   13.7 H D 


 


RBC   3.06 L 


 


Hgb   9.8 L 


 


Hct   32.3 L 


 


MCV   105.4 H 


 


MCH   31.9 H 


 


MCHC   30.3 L 


 


RDW   13.8 


 


Plt Count   316 


 


MPV   7.8 


 


Neut % (Auto)   78.4 H 


 


Lymph % (Auto)   11.1 L 


 


Mono % (Auto)   7.4 


 


Eos % (Auto)   2.6 


 


Baso % (Auto)   0.5 


 


Neut #   10.7 H 


 


Lymph #   1.5 


 


Mono #   1.0 H 


 


Eos #   0.4 


 


Baso #   0.1 


 


pCO2    40


 


pO2    71 L


 


HCO3    31.3 H


 


ABG pH    7.51 H


 


ABG Total CO2    33.1 H


 


ABG O2 Saturation    96.0


 


ABG O2 Content    13.9 L


 


ABG Base Excess    8.2 H


 


ABG Hemoglobin    10.5 L


 


ABG Carboxyhemoglobin    1.6 H


 


POC ABG HHb (Measured)    3.9


 


ABG Methemoglobin    0.8


 


ABG O2 Capacity    14.5 L


 


Chu Test    Yes


 


A-a O2 Difference    236.0


 


Hgb O2 Saturation    93.7 L


 


FiO2    50.0


 


Blood Gas Comments    333


 


Blood Gas Notified Time    545


 


Sodium   146 


 


Potassium   3.4 L 


 


Chloride   106 


 


Carbon Dioxide   31 H 


 


Anion Gap   12 


 


BUN   15 


 


Creatinine   1.1 


 


Est GFR ( Amer)   > 60 


 


Est GFR (Non-Af Amer)   > 60 


 


POC Glucose (mg/dL)  129 H  


 


Random Glucose   94 


 


Calcium   8.3 L 


 


Phosphorus   3.2 


 


Magnesium   2.1 


 


Total Bilirubin   0.5 


 


AST   34 


 


ALT   33 


 


Alkaline Phosphatase   98 


 


Total Protein   6.3 


 


Albumin   2.9 L 


 


Globulin   3.4 


 


Albumin/Globulin Ratio   0.9 L 














  12/18/15 12/18/15





  05:59 17:31


 


WBC  


 


RBC  


 


Hgb  


 


Hct  


 


MCV  


 


MCH  


 


MCHC  


 


RDW  


 


Plt Count  


 


MPV  


 


Neut % (Auto)  


 


Lymph % (Auto)  


 


Mono % (Auto)  


 


Eos % (Auto)  


 


Baso % (Auto)  


 


Neut #  


 


Lymph #  


 


Mono #  


 


Eos #  


 


Baso #  


 


pCO2  


 


pO2  


 


HCO3  


 


ABG pH  


 


ABG Total CO2  


 


ABG O2 Saturation  


 


ABG O2 Content  


 


ABG Base Excess  


 


ABG Hemoglobin  


 


ABG Carboxyhemoglobin  


 


POC ABG HHb (Measured)  


 


ABG Methemoglobin  


 


ABG O2 Capacity  


 


Chu Test  


 


A-a O2 Difference  


 


Hgb O2 Saturation  


 


FiO2  


 


Blood Gas Comments  


 


Blood Gas Notified Time  


 


Sodium  


 


Potassium  


 


Chloride  


 


Carbon Dioxide  


 


Anion Gap  


 


BUN  


 


Creatinine  


 


Est GFR ( Amer)  


 


Est GFR (Non-Af Amer)  


 


POC Glucose (mg/dL)  87  73


 


Random Glucose  


 


Calcium  


 


Phosphorus  


 


Magnesium  


 


Total Bilirubin  


 


AST  


 


ALT  


 


Alkaline Phosphatase  


 


Total Protein  


 


Albumin  


 


Globulin  


 


Albumin/Globulin Ratio  














- Constitutional


Appears: Toxic





- Head Exam


Head Exam: NORMAL INSPECTION





- Eye Exam


Eye Exam: Normal appearance





- ENT Exam


ENT Exam: Mucous Membranes Dry





- Neck Exam


Neck exam: absent: Thyromegaly





- Respiratory Exam


Respiratory Exam: Decreased Breath Sounds





- Cardiovascular Exam


Cardiovascular Exam: REGULAR RHYTHM





- GI/Abdominal Exam


GI & Abdominal Exam: Normal Bowel Sounds





- Rectal Exam


Rectal Exam: Deferred





- Extremities Exam


Extremities exam: pedal edema





- Skin


Skin Exam: Normal Color





Assessment/Plan


(1) Cardiac arrest


Assessment and plan: 


unclear etiology.  Recommend serial cardiac enzymes.  Check troponin.  check 

echocardiogram. 





Patient has no current mental status.  I advise conservative medical therapy. 

No plans for cardiac cath.








Current Visit: Yes   Status: Acute





(2) NSTEMI (non-ST elevated myocardial infarction)


Assessment and plan: 


Patient may have underlying CAD as a cause of his acute event and cardiac 

arrest.  





Conservative therapy.


Current Visit: Yes   Status: Acute





(3) Ventricular tachycardia


Assessment and plan: 


if recurrence recommend amiodarone drip.


Current Visit: Yes   Status: Acute

## 2015-12-18 NOTE — CP.CCUPN
CCU Subjective





- Physician Review


Subjective (Free Text): 


***INTENSIVIST PROGRESS NOTE***


Patient examined, interim events reviewed:





After spontaneously awakening when Propofol stopped yesterday and sedation 

maintained only with Ativan/Morphine, tolerated over an hour on CPAP/PS and 

decision made to liberate off MV and tolerated T-piece support with good 

respiratory efforts.   Two hours later, became suddenly restless and agitated, 

with recurrent tachycardai up to 160, subsequent;y placed back on MV overnight 

after requiring re-sedation with Propofol up to 31 mcg/kg/min.  No recurrent 

seizure activity noted. T 100.7F max overnight, 134/75, 90, 16, 100% on 60% 

oxygen, Breathing 16 on AC 16., PPP up to 32. 24H I/Os = 2894/3075 ml.








EXAM-


HEENT: no icterus, no nystagmus, pupils 2-3 mm and reactive,  no gaze preference

,  no nystagmus


NECK: no visible JVD, supple, carotids equal upstroke bilat/no bruits


CHEST: decreased BS bases, no wheezes audible


HEART:  regular, distant, S1S2, no murmur audible, no rubs.


ABD:  soft, no increased distention, no focal tenderness,  no HSM. BS hypoactive

, 


EXT:    +UE bilat edema, no peripheral/ digital cyanosis, no palpable cords, 

distal pulses intact and symmetrical, SCDs on bilat. 


NEURO:  plantars no response. Otherwise flaccid legs. 


SKIN:   no rashes





LABS


WBC= 13.7


HGB= 9.8


PLTs = 316K


Na= 146


K=  3.4


HCO3= 31


BUN/Cr=  15/1.1


BS= 94


7.51/40/71


CXR: ETT position OK above ru, bilateral Left > Right  multi-lobular 

interstitial changes (my interp).





Assessment: 


1.   Cardiac Arrest with resuscitation from VT. 


2.   Post-Resuscitation Therapeutic Hypothermia-completed


3.   Anoxic Encephalopathy


4.     Aspiration Pneumonia


5.    H/o Seizure Disorder with recurrence








PLAN:


1.   Day # 11  on MV support, still expect long term need for MV support, given 

slow to resolve and worsening  aspiration pneumonia, and anoxic encephalopathy 

still evident. He did tolerate brief time off MV, but not amenabl;e to 

extubation with inability to self-protect airway.  Early trach, PEG and 

possible PICC still advised. TLC placed 2 days ago emergently. Ongoing attempts 

to locate next-of-kin noted ( 2 sisters).  In my opinion, Trach would be a life-

saving and necessary procedure in this case, for the potential instance if 

patient ever self-extubates and we are unable to re-intubate in a timely manner

, further anoxic injury or death may occur.  


2.   Coverage for Klebsiella on last sptutm cxs, would re-culture agin with new 

temp spike and leukocytosis, on Vanco/Rocephin.


3.   EEG, Keppra maintenance.


4.   K repletion, check Mg, Phos levels.


5.   OGT replaced due to coiling in the mouth.


6.   PT eval for bedside PROM.

















- Vital Signs / Intake & Output


Vital Signs (Last 4 hours): 


Vital Signs











  Temp Pulse Resp BP Pulse Ox


 


 12/18/15 06:00  100.6 F H  91 H  16  134/75  100


 


 12/18/15 03:17  100.5 F H  91 H  17  109/67  96











Intake and Output (Last 8hrs): 


 Intake & Output











 12/17/15 12/18/15 12/18/15





 22:59 06:59 14:59


 


Intake Total 627 937 


 


Output Total 585 640 


 


Balance 42 297 


 


Intake:   


 


   525 


 


  Intake, Piggyback 257 112 


 


  Tube Feeding 120 150 


 


  Free Water Flush 100  


 


  Other  150 


 


Output:   


 


  Gastric Amount 5 15 


 


    Stomach 5 15 


 


  Urine 580 625 


 


    Urethral (Lua) 580 625 














- Medications


Active Medications: 


Active Medications











Generic Name Dose Route Start Last Admin





  Trade Name Freq  PRN Reason Stop Dose Admin


 


Acetaminophen  650 mg 12/13/15 13:21 12/18/15 02:28





  Tylenol 650mg/20.3ml Solution Ud  PO   650 mg





  Q6 PRN   Administration





  Fever   


 


Acetaminophen  650 mg 12/13/15 23:57 12/14/15 06:53





  Tylenol 650 Mg Supp  MS   650 mg





  Q6 PRN   Administration





  fevers   


 


Albuterol/Ipratropium  3 ml 12/17/15 20:00 12/18/15 01:00





  Duoneb 3 Mg/0.5 Mg (3 Ml) Ud  INH   3 ml





  RQ6 [facility]   Administration


 


Aspirin  81 mg 12/13/15 13:30 12/17/15 09:19





  Ecotrin  PO   81 mg





  DAILY [facility]   Administration


 


Enalapril Maleate  10 mg 12/10/15 11:15 12/17/15 09:24





  Vasotec  PO   10 mg





  DAILY [facility]   Administration


 


Enoxaparin Sodium  40 mg 12/15/15 09:15 12/17/15 09:20





  Lovenox  SC   40 mg





  DAILY [facility]   Administration


 


Famotidine  20 mg 12/10/15 17:00 12/17/15 17:19





  Pepcid  GT   Not Given





  BID [facility]   


 


Vancomycin HCl 1 gm/ Sodium  250 mls @ 125 mls/hr 12/08/15 18:45 12/17/15 22:00





  Chloride  IVPB   125 mls/hr





  Q12 [facility]   Administration


 


Ceftriaxone Sodium 2 gm/  100 mls @ 100 mls/hr 12/13/15 13:30 12/17/15 09:23





  Sodium Chloride  IVPB   100 mls/hr





  DAILY [facility]   Administration


 


Levetiracetam 500 mg/ Sodium  105 mls @ 210 mls/hr 12/16/15 21:00 12/17/15 22:00





  Chloride  IVPB   210 mls/hr





  Q12 [facility]   Administration


 


Propofol  100 mls @ 2.041 mls/hr 12/17/15 21:15 12/18/15 01:07





  Diprivan  IV 12/18/15 21:01  16 mls/hr





  .Q24H [facility]   Administration





  Protocol   





  5 MCG/KG/MIN   


 


Sodium Chloride  1,000 mls @ 75 mls/hr 12/17/15 21:15 12/17/15 21:25





  Sodium Chloride 0.45%  IV 12/18/15 21:04  75 mls/hr





  [id number] [facility]   Administration


 


Potassium Chloride  50 mls @ 50 mls/hr 12/18/15 06:00 12/18/15 05:54





  Potassium Cl 10meq/50ml Sterile Water  IVPB 12/18/15 07:59  50 mls/hr





  Q1 [facility]   Administration


 


Insulin Human Regular  0 units 12/10/15 22:00 12/18/15 06:48





  Humulin R  SC   Not Given





  ACHS [facility]   





  Protocol   


 


Levalbuterol HCl  0.63 mg 12/17/15 21:02 12/17/15 21:09





  Xopenex  INH   0.63 mg





  RQ8 PRN   Administration





  Shortness of Breath   


 


Lorazepam  2 mg 12/17/15 08:26 12/17/15 21:00





  Ativan  IVP   2 mg





  Q4 PRN   Administration





  Agitation   


 


Morphine Sulfate  2 mg 12/17/15 20:23 12/17/15 20:24





  Morphine  IVP   2 mg





  Q4 PRN   Administration





  Pain, moderate (4-7)   


 


Multivitamins/Vitamin C  5 ml 12/10/15 09:00 12/17/15 09:20





  Multi-Delyn Liquid  PO   5 ml





  DAILY [facility]   Administration


 


Nitroglycerin  2 in 12/10/15 16:00 12/18/15 05:00





  Nitro-Bid 2% Oint  TOP   2 in





  Q6 [facility]   Administration














- Patient Studies


Lab Studies: 


 Lab Studies











  12/18/15 12/18/15 12/18/15 Range/Units





  05:59 05:35 04:05 


 


WBC    13.7 H D  (4.8-10.8)  K/uL


 


RBC    3.06 L  (4.40-5.90)  Mil/uL


 


Hgb    9.8 L  (12.0-18.0)  g/dL


 


Hct    32.3 L  (35.0-51.0)  %


 


MCV    105.4 H  (80.0-94.0)  fl


 


MCH    31.9 H  (27.0-31.0)  pg


 


MCHC    30.3 L  (33.0-37.0)  g/dL


 


RDW    13.8  (11.5-14.5)  %


 


Plt Count    316  (130-400)  K/uL


 


MPV    7.8  (7.2-11.7)  fl


 


Neut % (Auto)    78.4 H  (50.0-75.0)  %


 


Lymph % (Auto)    11.1 L  (20.0-40.0)  %


 


Mono % (Auto)    7.4  (0.0-10.0)  %


 


Eos % (Auto)    2.6  (0.0-4.0)  %


 


Baso % (Auto)    0.5  (0.0-2.0)  %


 


Neut #    10.7 H  (1.8-7.0)  K/uL


 


Lymph #    1.5  (1.0-4.3)  K/uL


 


Mono #    1.0 H  (0.0-0.8)  K/uL


 


Eos #    0.4  (0.0-0.7)  K/uL


 


Baso #    0.1  (0.0-0.2)  K/uL


 


pCO2   40   (35-45)  mm/Hg


 


pO2   71 L   ()  mm/Hg


 


HCO3   31.3 H   (21-28)  mmol/L


 


ABG pH   7.51 H   (7.35-7.45)  


 


ABG Total CO2   33.1 H   (22-28)  mmol/L


 


ABG O2 Saturation   96.0   (95-98)  %


 


ABG O2 Content   13.9 L   (15-23)  ML/dL


 


ABG Base Excess   8.2 H   (-2.0-3.0)  mmol/L


 


ABG Hemoglobin   10.5 L   (11.7-17.4)  g/dL


 


ABG Carboxyhemoglobin   1.6 H   (0.5-1.5)  %


 


POC ABG HHb (Measured)   3.9   (0.0-5.0)  %


 


ABG Methemoglobin   0.8   (0.0-3.0)  %


 


ABG O2 Capacity   14.5 L   (16-24)  mL/dL


 


Chu Test   Yes   


 


A-a O2 Difference   236.0   mm/Hg


 


Hgb O2 Saturation   93.7 L   (95.0-98.0)  %


 


FiO2   50.0   %


 


Blood Gas Comments   333   


 


Blood Gas Notified Time   545   


 


Sodium    146  (132-148)  mmol/l


 


Potassium    3.4 L  (3.6-5.0)  MMOL/L


 


Chloride    106  ()  mmol/L


 


Carbon Dioxide    31 H  (22-30)  mmol/L


 


Anion Gap    12  (10-20)  


 


BUN    15  (9-20)  mg/dl


 


Creatinine    1.1  (0.8-1.5)  mg/dL


 


Est GFR (African Amer)    > 60  


 


Est GFR (Non-Af Amer)    > 60  


 


POC Glucose (mg/dL)  87    ()  mg/dL


 


Random Glucose    94  ()  mg/dL


 


Calcium    8.3 L  (8.4-10.2)  mg/dL


 


Phosphorus    3.2  (2.5-4.5)  mg/dl


 


Magnesium    2.1  (1.6-2.3)  MG/DL


 


Total Bilirubin    0.5  (0.2-1.3)  mg/dl


 


AST    34  (17-59)  U/L


 


ALT    33  (21-72)  U/L


 


Alkaline Phosphatase    98  ()  U/L


 


Troponin I     (0.00-0.120)  ng/mL


 


Total Protein    6.3  (6.3-8.2)  G/DL


 


Albumin    2.9 L  (3.5-5.0)  g/dL


 


Globulin    3.4  (2.2-3.9)  gm/dL


 


Albumin/Globulin Ratio    0.9 L  (1.0-2.1)  














  12/17/15 12/17/15 12/17/15 Range/Units





  22:05 17:10 10:51 


 


WBC     (4.8-10.8)  K/uL


 


RBC     (4.40-5.90)  Mil/uL


 


Hgb     (12.0-18.0)  g/dL


 


Hct     (35.0-51.0)  %


 


MCV     (80.0-94.0)  fl


 


MCH     (27.0-31.0)  pg


 


MCHC     (33.0-37.0)  g/dL


 


RDW     (11.5-14.5)  %


 


Plt Count     (130-400)  K/uL


 


MPV     (7.2-11.7)  fl


 


Neut % (Auto)     (50.0-75.0)  %


 


Lymph % (Auto)     (20.0-40.0)  %


 


Mono % (Auto)     (0.0-10.0)  %


 


Eos % (Auto)     (0.0-4.0)  %


 


Baso % (Auto)     (0.0-2.0)  %


 


Neut #     (1.8-7.0)  K/uL


 


Lymph #     (1.0-4.3)  K/uL


 


Mono #     (0.0-0.8)  K/uL


 


Eos #     (0.0-0.7)  K/uL


 


Baso #     (0.0-0.2)  K/uL


 


pCO2     (35-45)  mm/Hg


 


pO2     ()  mm/Hg


 


HCO3     (21-28)  mmol/L


 


ABG pH     (7.35-7.45)  


 


ABG Total CO2     (22-28)  mmol/L


 


ABG O2 Saturation     (95-98)  %


 


ABG O2 Content     (15-23)  ML/dL


 


ABG Base Excess     (-2.0-3.0)  mmol/L


 


ABG Hemoglobin     (11.7-17.4)  g/dL


 


ABG Carboxyhemoglobin     (0.5-1.5)  %


 


POC ABG HHb (Measured)     (0.0-5.0)  %


 


ABG Methemoglobin     (0.0-3.0)  %


 


ABG O2 Capacity     (16-24)  mL/dL


 


Chu Test     


 


A-a O2 Difference     mm/Hg


 


Hgb O2 Saturation     (95.0-98.0)  %


 


FiO2     %


 


Blood Gas Comments     


 


Blood Gas Notified Time     


 


Sodium     (132-148)  mmol/l


 


Potassium     (3.6-5.0)  MMOL/L


 


Chloride     ()  mmol/L


 


Carbon Dioxide     (22-30)  mmol/L


 


Anion Gap     (10-20)  


 


BUN     (9-20)  mg/dl


 


Creatinine     (0.8-1.5)  mg/dL


 


Est GFR (African Amer)     


 


Est GFR (Non-Af Amer)     


 


POC Glucose (mg/dL)  129 H  102  93  ()  mg/dL


 


Random Glucose     ()  mg/dL


 


Calcium     (8.4-10.2)  mg/dL


 


Phosphorus     (2.5-4.5)  mg/dl


 


Magnesium     (1.6-2.3)  MG/DL


 


Total Bilirubin     (0.2-1.3)  mg/dl


 


AST     (17-59)  U/L


 


ALT     (21-72)  U/L


 


Alkaline Phosphatase     ()  U/L


 


Troponin I     (0.00-0.120)  ng/mL


 


Total Protein     (6.3-8.2)  G/DL


 


Albumin     (3.5-5.0)  g/dL


 


Globulin     (2.2-3.9)  gm/dL


 


Albumin/Globulin Ratio     (1.0-2.1)  














  12/17/15 Range/Units





  10:37 


 


WBC   (4.8-10.8)  K/uL


 


RBC   (4.40-5.90)  Mil/uL


 


Hgb   (12.0-18.0)  g/dL


 


Hct   (35.0-51.0)  %


 


MCV   (80.0-94.0)  fl


 


MCH   (27.0-31.0)  pg


 


MCHC   (33.0-37.0)  g/dL


 


RDW   (11.5-14.5)  %


 


Plt Count   (130-400)  K/uL


 


MPV   (7.2-11.7)  fl


 


Neut % (Auto)   (50.0-75.0)  %


 


Lymph % (Auto)   (20.0-40.0)  %


 


Mono % (Auto)   (0.0-10.0)  %


 


Eos % (Auto)   (0.0-4.0)  %


 


Baso % (Auto)   (0.0-2.0)  %


 


Neut #   (1.8-7.0)  K/uL


 


Lymph #   (1.0-4.3)  K/uL


 


Mono #   (0.0-0.8)  K/uL


 


Eos #   (0.0-0.7)  K/uL


 


Baso #   (0.0-0.2)  K/uL


 


pCO2   (35-45)  mm/Hg


 


pO2   ()  mm/Hg


 


HCO3   (21-28)  mmol/L


 


ABG pH   (7.35-7.45)  


 


ABG Total CO2   (22-28)  mmol/L


 


ABG O2 Saturation   (95-98)  %


 


ABG O2 Content   (15-23)  ML/dL


 


ABG Base Excess   (-2.0-3.0)  mmol/L


 


ABG Hemoglobin   (11.7-17.4)  g/dL


 


ABG Carboxyhemoglobin   (0.5-1.5)  %


 


POC ABG HHb (Measured)   (0.0-5.0)  %


 


ABG Methemoglobin   (0.0-3.0)  %


 


ABG O2 Capacity   (16-24)  mL/dL


 


Chu Test   


 


A-a O2 Difference   mm/Hg


 


Hgb O2 Saturation   (95.0-98.0)  %


 


FiO2   %


 


Blood Gas Comments   


 


Blood Gas Notified Time   


 


Sodium   (132-148)  mmol/l


 


Potassium   (3.6-5.0)  MMOL/L


 


Chloride   ()  mmol/L


 


Carbon Dioxide   (22-30)  mmol/L


 


Anion Gap   (10-20)  


 


BUN   (9-20)  mg/dl


 


Creatinine   (0.8-1.5)  mg/dL


 


Est GFR (African Amer)   


 


Est GFR (Non-Af Amer)   


 


POC Glucose (mg/dL)   ()  mg/dL


 


Random Glucose   ()  mg/dL


 


Calcium   (8.4-10.2)  mg/dL


 


Phosphorus   (2.5-4.5)  mg/dl


 


Magnesium   (1.6-2.3)  MG/DL


 


Total Bilirubin   (0.2-1.3)  mg/dl


 


AST   (17-59)  U/L


 


ALT   (21-72)  U/L


 


Alkaline Phosphatase   ()  U/L


 


Troponin I  0.0720  (0.00-0.120)  ng/mL


 


Total Protein   (6.3-8.2)  G/DL


 


Albumin   (3.5-5.0)  g/dL


 


Globulin   (2.2-3.9)  gm/dL


 


Albumin/Globulin Ratio   (1.0-2.1)  








 Laboratory Results - last 24 hr











  12/17/15 12/17/15 12/17/15





  10:37 10:51 17:10


 


WBC   


 


RBC   


 


Hgb   


 


Hct   


 


MCV   


 


MCH   


 


MCHC   


 


RDW   


 


Plt Count   


 


MPV   


 


Neut % (Auto)   


 


Lymph % (Auto)   


 


Mono % (Auto)   


 


Eos % (Auto)   


 


Baso % (Auto)   


 


Neut #   


 


Lymph #   


 


Mono #   


 


Eos #   


 


Baso #   


 


pCO2   


 


pO2   


 


HCO3   


 


ABG pH   


 


ABG Total CO2   


 


ABG O2 Saturation   


 


ABG O2 Content   


 


ABG Base Excess   


 


ABG Hemoglobin   


 


ABG Carboxyhemoglobin   


 


POC ABG HHb (Measured)   


 


ABG Methemoglobin   


 


ABG O2 Capacity   


 


Chu Test   


 


A-a O2 Difference   


 


Hgb O2 Saturation   


 


FiO2   


 


Blood Gas Comments   


 


Blood Gas Notified Time   


 


Sodium   


 


Potassium   


 


Chloride   


 


Carbon Dioxide   


 


Anion Gap   


 


BUN   


 


Creatinine   


 


Est GFR ( Amer)   


 


Est GFR (Non-Af Amer)   


 


POC Glucose (mg/dL)   93  102


 


Random Glucose   


 


Calcium   


 


Phosphorus   


 


Magnesium   


 


Total Bilirubin   


 


AST   


 


ALT   


 


Alkaline Phosphatase   


 


Troponin I  0.0720  


 


Total Protein   


 


Albumin   


 


Globulin   


 


Albumin/Globulin Ratio   














  12/17/15 12/18/15 12/18/15





  22:05 04:05 05:35


 


WBC   13.7 H D 


 


RBC   3.06 L 


 


Hgb   9.8 L 


 


Hct   32.3 L 


 


MCV   105.4 H 


 


MCH   31.9 H 


 


MCHC   30.3 L 


 


RDW   13.8 


 


Plt Count   316 


 


MPV   7.8 


 


Neut % (Auto)   78.4 H 


 


Lymph % (Auto)   11.1 L 


 


Mono % (Auto)   7.4 


 


Eos % (Auto)   2.6 


 


Baso % (Auto)   0.5 


 


Neut #   10.7 H 


 


Lymph #   1.5 


 


Mono #   1.0 H 


 


Eos #   0.4 


 


Baso #   0.1 


 


pCO2    40


 


pO2    71 L


 


HCO3    31.3 H


 


ABG pH    7.51 H


 


ABG Total CO2    33.1 H


 


ABG O2 Saturation    96.0


 


ABG O2 Content    13.9 L


 


ABG Base Excess    8.2 H


 


ABG Hemoglobin    10.5 L


 


ABG Carboxyhemoglobin    1.6 H


 


POC ABG HHb (Measured)    3.9


 


ABG Methemoglobin    0.8


 


ABG O2 Capacity    14.5 L


 


Chu Test    Yes


 


A-a O2 Difference    236.0


 


Hgb O2 Saturation    93.7 L


 


FiO2    50.0


 


Blood Gas Comments    333


 


Blood Gas Notified Time    545


 


Sodium   146 


 


Potassium   3.4 L 


 


Chloride   106 


 


Carbon Dioxide   31 H 


 


Anion Gap   12 


 


BUN   15 


 


Creatinine   1.1 


 


Est GFR ( Amer)   > 60 


 


Est GFR (Non-Af Amer)   > 60 


 


POC Glucose (mg/dL)  129 H  


 


Random Glucose   94 


 


Calcium   8.3 L 


 


Phosphorus   3.2 


 


Magnesium   2.1 


 


Total Bilirubin   0.5 


 


AST   34 


 


ALT   33 


 


Alkaline Phosphatase   98 


 


Troponin I   


 


Total Protein   6.3 


 


Albumin   2.9 L 


 


Globulin   3.4 


 


Albumin/Globulin Ratio   0.9 L 














  12/18/15





  05:59


 


WBC 


 


RBC 


 


Hgb 


 


Hct 


 


MCV 


 


MCH 


 


MCHC 


 


RDW 


 


Plt Count 


 


MPV 


 


Neut % (Auto) 


 


Lymph % (Auto) 


 


Mono % (Auto) 


 


Eos % (Auto) 


 


Baso % (Auto) 


 


Neut # 


 


Lymph # 


 


Mono # 


 


Eos # 


 


Baso # 


 


pCO2 


 


pO2 


 


HCO3 


 


ABG pH 


 


ABG Total CO2 


 


ABG O2 Saturation 


 


ABG O2 Content 


 


ABG Base Excess 


 


ABG Hemoglobin 


 


ABG Carboxyhemoglobin 


 


POC ABG HHb (Measured) 


 


ABG Methemoglobin 


 


ABG O2 Capacity 


 


Chu Test 


 


A-a O2 Difference 


 


Hgb O2 Saturation 


 


FiO2 


 


Blood Gas Comments 


 


Blood Gas Notified Time 


 


Sodium 


 


Potassium 


 


Chloride 


 


Carbon Dioxide 


 


Anion Gap 


 


BUN 


 


Creatinine 


 


Est GFR ( Amer) 


 


Est GFR (Non-Af Amer) 


 


POC Glucose (mg/dL)  87


 


Random Glucose 


 


Calcium 


 


Phosphorus 


 


Magnesium 


 


Total Bilirubin 


 


AST 


 


ALT 


 


Alkaline Phosphatase 


 


Troponin I 


 


Total Protein 


 


Albumin 


 


Globulin 


 


Albumin/Globulin Ratio 











Fingerstick Blood Sugar Results: 83

## 2015-12-18 NOTE — CP.PCM.PN
Subjective





- Subjective


Subjective: 


spontaneously awakening when Propofol stopped yesterday


,tolerated over an hour on CPAP/PS 


IV RX IN PROGRESS





Review of Systems





- Review of Systems


All systems: reviewed and no additional remarkable complaints except





- Constitutional


Constitutional: absent: Fever





Objective





- Vital Signs/Intake and Output


Vital Signs (last 24 hours): 


 Vital Signs - 24 hr











  12/17/15 12/17/15 12/17/15





  14:00 16:00 18:00


 


Temperature  98.8 F 


 


Pulse Rate 92 H 101 H 100 H


 


Respiratory 16 20 22





Rate   


 


Blood Pressure 144/87 156/109 H 158/102 H


 


O2 Sat by Pulse 100 99 99





Oximetry   














  12/17/15 12/17/15 12/17/15





  20:00 21:23 22:00


 


Temperature 100.0 F H  100.3 F H


 


Pulse Rate 170 H 180 H 100 H


 


Respiratory 25 H  12





Rate   


 


Blood Pressure 150/101 H 157/101 H 105/70


 


O2 Sat by Pulse 85 L  100





Oximetry   














  12/18/15 12/18/15 12/18/15





  00:00 00:58 02:00


 


Temperature  100.7 F H 100.3 F H


 


Pulse Rate 93 H  93 H


 


Respiratory 15  14





Rate   


 


Blood Pressure 119/72  123/72


 


O2 Sat by Pulse 100  98





Oximetry   














  12/18/15 12/18/15 12/18/15





  03:17 06:00 08:00


 


Temperature 100.5 F H 100.6 F H 98.1 F


 


Pulse Rate 91 H 91 H 86


 


Respiratory 17 16 18





Rate   


 


Blood Pressure 109/67 134/75 129/71


 


O2 Sat by Pulse 96 100 99





Oximetry   














  12/18/15 12/18/15





  08:05 10:00


 


Temperature  


 


Pulse Rate  88


 


Respiratory  12





Rate  


 


Blood Pressure 136/70 130/75


 


O2 Sat by Pulse  100





Oximetry  











Intake and Output (last 12 hours): 


 Intake & Output











 12/17/15 12/18/15 12/18/15





 18:59 06:59 18:59


 


Intake Total 1650 1244 


 


Output Total 2200 875 


 


Balance -550 369 


 


Intake:   


 


  IV 75 675 


 


  Intake, Piggyback 750 269 


 


  Tube Feeding 525 150 


 


  Free Water Flush 300  


 


  Other  150 


 


Output:   


 


  Gastric Amount  20 


 


    Stomach  20 


 


  Urine 2200 855 


 


    Urethral (Lua) 2200 855 














- Medications


Medications: 


 Current Medications





Acetaminophen (Tylenol 650mg/20.3ml Solution Ud)  650 mg PO Q6 PRN


   PRN Reason: Fever


   Last Admin: 12/18/15 02:28 Dose:  650 mg


Acetaminophen (Tylenol 650 Mg Supp)  650 mg [place] Q6 PRN


   PRN Reason: fevers


   Last Admin: 12/14/15 06:53 Dose:  650 mg


Albuterol/Ipratropium (Duoneb 3 Mg/0.5 Mg (3 Ml) Ud)  3 ml INH RQ6 [facility]


   Last Admin: 12/18/15 08:13 Dose:  3 ml


Albuterol/Ipratropium (Duoneb 3 Mg/0.5 Mg (3 Ml) Ud)  3 ml INH RQ6 [facility]


   Last Admin: 12/18/15 13:17 Dose:  3 ml


Aspirin (Ecotrin)  81 mg PO DAILY [facility]


   Last Admin: 12/18/15 08:05 Dose:  81 mg


Enalapril Maleate (Vasotec)  10 mg PO DAILY [facility]


   Last Admin: 12/18/15 08:07 Dose:  10 mg


Famotidine (Pepcid)  20 mg GT BID [facility]


   Last Admin: 12/18/15 08:07 Dose:  20 mg


Vancomycin HCl 1 gm/ Sodium (Chloride)  250 mls @ 125 mls/hr IVPB Q12 [facility]


   Last Admin: 12/17/15 22:00 Dose:  125 mls/hr


Insulin Human Regular (Humulin R)  0 units SC ACHS [facility]


   PRN Reason: Protocol


   Last Admin: 12/18/15 06:48 Dose:  Not Given


Levetiracetam (Keppra)  500 mg PO BID [facility]


Lorazepam (Ativan)  2 mg IVP Q4 PRN


   PRN Reason: Agitation


   Last Admin: 12/17/15 21:00 Dose:  2 mg


Morphine Sulfate (Morphine)  2 mg IVP Q4 PRN


   PRN Reason: Pain, moderate (4-7)


   Last Admin: 12/17/15 20:24 Dose:  2 mg


Multivitamins/Vitamin C (Multi-Delyn Liquid)  5 ml PO DAILY [facility]


   Last Admin: 12/18/15 08:07 Dose:  5 ml


Nitroglycerin (Nitro-Bid 2% Oint)  2 in TOP Q6 [facility]


   Last Admin: 12/18/15 05:00 Dose:  2 in











- Labs


Labs (last 24 hours): 


 Laboratory Results - last 24 hr











  12/17/15 12/17/15 12/18/15





  17:10 22:05 04:05


 


WBC    13.7 H D


 


RBC    3.06 L


 


Hgb    9.8 L


 


Hct    32.3 L


 


MCV    105.4 H


 


MCH    31.9 H


 


MCHC    30.3 L


 


RDW    13.8


 


Plt Count    316


 


MPV    7.8


 


Neut % (Auto)    78.4 H


 


Lymph % (Auto)    11.1 L


 


Mono % (Auto)    7.4


 


Eos % (Auto)    2.6


 


Baso % (Auto)    0.5


 


Neut #    10.7 H


 


Lymph #    1.5


 


Mono #    1.0 H


 


Eos #    0.4


 


Baso #    0.1


 


pCO2   


 


pO2   


 


HCO3   


 


ABG pH   


 


ABG Total CO2   


 


ABG O2 Saturation   


 


ABG O2 Content   


 


ABG Base Excess   


 


ABG Hemoglobin   


 


ABG Carboxyhemoglobin   


 


POC ABG HHb (Measured)   


 


ABG Methemoglobin   


 


ABG O2 Capacity   


 


Chu Test   


 


A-a O2 Difference   


 


Hgb O2 Saturation   


 


FiO2   


 


Blood Gas Comments   


 


Blood Gas Notified Time   


 


Sodium    146


 


Potassium    3.4 L


 


Chloride    106


 


Carbon Dioxide    31 H


 


Anion Gap    12


 


BUN    15


 


Creatinine    1.1


 


Est GFR ( Amer)    > 60


 


Est GFR (Non-Af Amer)    > 60


 


POC Glucose (mg/dL)  102  129 H 


 


Random Glucose    94


 


Calcium    8.3 L


 


Phosphorus    3.2


 


Magnesium    2.1


 


Total Bilirubin    0.5


 


AST    34


 


ALT    33


 


Alkaline Phosphatase    98


 


Total Protein    6.3


 


Albumin    2.9 L


 


Globulin    3.4


 


Albumin/Globulin Ratio    0.9 L














  12/18/15 12/18/15





  05:35 05:59


 


WBC  


 


RBC  


 


Hgb  


 


Hct  


 


MCV  


 


MCH  


 


MCHC  


 


RDW  


 


Plt Count  


 


MPV  


 


Neut % (Auto)  


 


Lymph % (Auto)  


 


Mono % (Auto)  


 


Eos % (Auto)  


 


Baso % (Auto)  


 


Neut #  


 


Lymph #  


 


Mono #  


 


Eos #  


 


Baso #  


 


pCO2  40 


 


pO2  71 L 


 


HCO3  31.3 H 


 


ABG pH  7.51 H 


 


ABG Total CO2  33.1 H 


 


ABG O2 Saturation  96.0 


 


ABG O2 Content  13.9 L 


 


ABG Base Excess  8.2 H 


 


ABG Hemoglobin  10.5 L 


 


ABG Carboxyhemoglobin  1.6 H 


 


POC ABG HHb (Measured)  3.9 


 


ABG Methemoglobin  0.8 


 


ABG O2 Capacity  14.5 L 


 


Chu Test  Yes 


 


A-a O2 Difference  236.0 


 


Hgb O2 Saturation  93.7 L 


 


FiO2  50.0 


 


Blood Gas Comments  333 


 


Blood Gas Notified Time  545 


 


Sodium  


 


Potassium  


 


Chloride  


 


Carbon Dioxide  


 


Anion Gap  


 


BUN  


 


Creatinine  


 


Est GFR ( Amer)  


 


Est GFR (Non-Af Amer)  


 


POC Glucose (mg/dL)   87


 


Random Glucose  


 


Calcium  


 


Phosphorus  


 


Magnesium  


 


Total Bilirubin  


 


AST  


 


ALT  


 


Alkaline Phosphatase  


 


Total Protein  


 


Albumin  


 


Globulin  


 


Albumin/Globulin Ratio  














- Constitutional


Appears: Non-toxic, Confused, Chronically Ill





- Head Exam


Head Exam: NORMOCEPHALIC





- Eye Exam


Eye Exam: absent: Scleral icterus





- ENT Exam


ENT Exam: Mucous Membranes Dry





- Neck Exam


Neck exam: absent: Lymphadenopathy





- Respiratory Exam


Respiratory Exam: Decreased Breath Sounds, Rhonchi





- Cardiovascular Exam


Cardiovascular Exam: REGULAR RHYTHM, +S1, +S2





- GI/Abdominal Exam


GI & Abdominal Exam: Diminished Bowel Sounds, Distended, Soft.  absent: 

Tenderness





- Extremities Exam


Extremities exam: pedal edema, pedal pulses present.  absent: tenderness





- Back Exam


Back exam: absent: CVA tenderness (L), CVA tenderness (R)





- Neurological Exam


Neurological exam: Altered





Assessment/Plan


(1) Agitation


Current Visit: Yes   Status: Acute


Comment: cont propofol for agitation


unsure if this is lower brainstem activity vs seizure activity vs other etiology








(2) Aspiration pneumonia


Current Visit: Yes   Status: Acute


Comment: worsened cxr, cont anbx, ID condult, trach placement


tmax lower, on anbx








(3) Cardiac arrest


Current Visit: Yes   Status: Acute


Comment: s/p acls proceudres, ST w/ periods of SB


echo-noted


cardio consult


pt remains on propofol for agitation-to be weaned


hr remains elevated unsure if this is related to fever, agitation or other 

etiology


cath when stable as per cardio


s/p cooling protocol


another run of VT yesterday, given amiodarone 


no cardiac intervention planned at present








(4) Diabetes mellitus with hyperglycemia


Current Visit: Yes   Status: Acute


Comment: riss per protocol








(5) History of seizure


Current Visit: Yes   Status: Acute


Comment: neuro consult


?? eeg


started on keppra








(6) NSTEMI (non-ST elevated myocardial infarction)


Current Visit: Yes   Status: Acute








- Assessment and Plan (Free Text)


Assessment: 


CONT SUPPORTIVE CARE


WEANING AS TOLERATED


IV ANTIBIOTICS FOR PNEUMONIA

## 2015-12-18 NOTE — RAD
HISTORY:

intubated  



COMPARISON:

Chest x-ray 12/17/2015



TECHNIQUE:

Chest one view .



FINDINGS:



LUNGS:

There are diffuse bilateral airspace consolidations, increased from 

the prior exam. Endotracheal tube tip is above the ru.



PLEURA:

No pleural effusion is identified.



CARDIOVASCULAR:

Heart size is within normal limits.There is a right-sided PICC line 

with tip overlying the region of the distal SVC.



OSSEOUS STRUCTURES:

No significant abnormalities.



VISUALIZED UPPER ABDOMEN:

There is an enteric feeding tube with side port and tip overlying 

region of the stomach.



OTHER FINDINGS:

None.



IMPRESSION:

Diffuse bilateral airspace consolidations, increased from the prior 

exam

## 2015-12-19 LAB
ARTERIAL BLOOD GAS HEMOGLOBIN: 9.9 G/DL (ref 11.7–17.4)
ARTERIAL BLOOD GAS O2 SAT: 98.5 % (ref 95–98)
ARTERIAL BLOOD GAS PCO2: 46 MM/HG (ref 35–45)
ARTERIAL BLOOD GAS TCO2: 34.1 MMOL/L (ref 22–28)
ARTERIAL PATENCY WRIST A: YES
BASOPHILS # BLD AUTO: 0.1 K/UL (ref 0–0.2)
BASOPHILS NFR BLD: 0.7 % (ref 0–2)
BASOPHILS NFR BLD: 1 % (ref 0–2)
BUN SERPL-MCNC: 17 MG/DL (ref 9–20)
CALCIUM SERPL-MCNC: 8.5 MG/DL (ref 8.4–10.2)
EOSINOPHIL # BLD AUTO: 0.4 K/UL (ref 0–0.7)
EOSINOPHIL NFR BLD: 1 % (ref 0–7)
EOSINOPHIL NFR BLD: 3.9 % (ref 0–4)
ERYTHROCYTE [DISTWIDTH] IN BLOOD BY AUTOMATED COUNT: 13.4 % (ref 11.5–14.5)
GFR NON-AFRICAN AMERICAN: > 60
HCO3 BLDA-SCNC: 31.1 MMOL/L (ref 21–28)
HGB BLD-MCNC: 9.6 G/DL (ref 12–18)
INHALED O2 CONCENTRATION: 60 %
LYMPHOCYTE: 8 % (ref 20–50)
LYMPHOCYTES # BLD AUTO: 1.1 K/UL (ref 1–4.3)
LYMPHOCYTES NFR BLD AUTO: 9.6 % (ref 20–40)
MCH RBC QN AUTO: 32 PG (ref 27–31)
MCHC RBC AUTO-ENTMCNC: 31 G/DL (ref 33–37)
MCV RBC AUTO: 103.2 FL (ref 80–94)
MONOCYTE: 8 % (ref 0–10)
MONOCYTES # BLD: 0.8 K/UL (ref 0–0.8)
MONOCYTES NFR BLD: 7.2 % (ref 0–10)
NEUTROPHILS # BLD: 8.9 K/UL (ref 1.8–7)
NEUTROPHILS NFR BLD AUTO: 78.6 % (ref 50–75)
NEUTROPHILS NFR BLD AUTO: 82 % (ref 42–75)
NRBC BLD AUTO-RTO: 0 % (ref 0–0)
O2 CAP BLDA-SCNC: 13.9 ML/DL (ref 16–24)
O2 CT BLDA-SCNC: 13.7 ML/DL (ref 15–23)
PH BLDA: 7.46 [PH] (ref 7.35–7.45)
PLATELET # BLD EST: (no result) 10*3/UL
PLATELET # BLD: 401 K/UL (ref 130–400)
PMV BLD AUTO: 7.6 FL (ref 7.2–11.7)
PO2 BLDA: 124 MM/HG (ref 80–100)
RBC # BLD AUTO: 2.99 MIL/UL (ref 4.4–5.9)
TOTAL CELLS COUNTED BLD: 100
WBC # BLD AUTO: 11.3 K/UL (ref 4.8–10.8)

## 2015-12-19 RX ADMIN — NITROGLYCERIN SCH IN: 20 OINTMENT TOPICAL at 04:00

## 2015-12-19 RX ADMIN — IPRATROPIUM BROMIDE AND ALBUTEROL SULFATE SCH ML: .5; 3 SOLUTION RESPIRATORY (INHALATION) at 19:41

## 2015-12-19 RX ADMIN — LEVETIRACETAM SCH MG: 100 SOLUTION ORAL at 17:00

## 2015-12-19 RX ADMIN — NITROGLYCERIN SCH IN: 20 OINTMENT TOPICAL at 21:05

## 2015-12-19 RX ADMIN — IPRATROPIUM BROMIDE AND ALBUTEROL SULFATE SCH: .5; 3 SOLUTION RESPIRATORY (INHALATION) at 19:42

## 2015-12-19 RX ADMIN — Medication SCH ML: at 08:29

## 2015-12-19 RX ADMIN — IPRATROPIUM BROMIDE AND ALBUTEROL SULFATE SCH: .5; 3 SOLUTION RESPIRATORY (INHALATION) at 07:53

## 2015-12-19 RX ADMIN — IPRATROPIUM BROMIDE AND ALBUTEROL SULFATE SCH ML: .5; 3 SOLUTION RESPIRATORY (INHALATION) at 01:03

## 2015-12-19 RX ADMIN — IPRATROPIUM BROMIDE AND ALBUTEROL SULFATE SCH ML: .5; 3 SOLUTION RESPIRATORY (INHALATION) at 07:53

## 2015-12-19 RX ADMIN — IPRATROPIUM BROMIDE AND ALBUTEROL SULFATE SCH: .5; 3 SOLUTION RESPIRATORY (INHALATION) at 13:41

## 2015-12-19 RX ADMIN — LEVETIRACETAM SCH MG: 100 SOLUTION ORAL at 08:29

## 2015-12-19 RX ADMIN — NITROGLYCERIN SCH IN: 20 OINTMENT TOPICAL at 16:00

## 2015-12-19 RX ADMIN — NITROGLYCERIN SCH IN: 20 OINTMENT TOPICAL at 10:00

## 2015-12-19 RX ADMIN — IPRATROPIUM BROMIDE AND ALBUTEROL SULFATE SCH ML: .5; 3 SOLUTION RESPIRATORY (INHALATION) at 13:41

## 2015-12-19 NOTE — CP.PCM.PN
Subjective





- Subjective


Subjective: 


no new clinical change.





Review of Systems





- Review of Systems


Systems not reviewed;Unavailable: Intubated





Objective





- Vital Signs/Intake and Output


Vital Signs (last 24 hours): 


 Vital Signs - 24 hr











  12/18/15 12/19/15 12/19/15





  22:00 00:00 02:00


 


Temperature  100.1 F H 


 


Pulse Rate 97 H 96 H 105 H


 


Respiratory 18 12 17





Rate   


 


Blood Pressure 168/100 H 174/100 H 181/100 H


 


O2 Sat by Pulse 99 99 97





Oximetry   














  12/19/15 12/19/15 12/19/15





  04:00 06:00 07:49


 


Temperature 99.1 F  99.0 F


 


Pulse Rate 99 H 92 H 103 H


 


Respiratory 13 14 14





Rate   


 


Blood Pressure 157/89 H 156/91 H 106/50 L


 


O2 Sat by Pulse 100 100 99





Oximetry   














  12/19/15 12/19/15 12/19/15





  12:00 16:00 18:00


 


Temperature 99.0 F 99.2 F 


 


Pulse Rate 94 H 93 H 95 H


 


Respiratory   





Rate   


 


Blood Pressure 166/100 H 151/99 H 163/97 H


 


O2 Sat by Pulse 100 98 98





Oximetry   











Intake and Output (last 12 hours): 


 Intake & Output











 12/19/15 12/19/15 12/20/15





 06:59 18:59 06:59


 


Intake Total 1000 1350 


 


Output Total 650 1000 


 


Balance 350 350 


 


Intake:   


 


   900 


 


  Intake, Piggyback 250 250 


 


  Free Water Flush  200 


 


Output:   


 


  Urine 650 1000 


 


    Urethral (Lua) 650 1000 


 


  Stool  0 














- Medications


Medications: 


 Current Medications





Acetaminophen (Tylenol 650mg/20.3ml Solution Ud)  650 mg PO Q6 PRN


   PRN Reason: Fever


   Last Admin: 12/18/15 02:28 Dose:  650 mg


Acetaminophen (Tylenol 650 Mg Supp)  650 mg [place] Q6 PRN


   PRN Reason: fevers


   Last Admin: 12/14/15 06:53 Dose:  650 mg


Albuterol/Ipratropium (Duoneb 3 Mg/0.5 Mg (3 Ml) Ud)  3 ml INH RQ6 [facility]


   Last Admin: 12/19/15 19:41 Dose:  3 ml


Albuterol/Ipratropium (Duoneb 3 Mg/0.5 Mg (3 Ml) Ud)  3 ml INH RQ6 [facility]


   Last Admin: 12/19/15 19:42 Dose:  Not Given


Aspirin (Ecotrin)  81 mg PO DAILY [facility]


   Last Admin: 12/19/15 08:28 Dose:  81 mg


Enalapril Maleate (Vasotec)  10 mg PO DAILY [facility]


   Last Admin: 12/19/15 08:30 Dose:  10 mg


Famotidine (Pepcid)  20 mg GT BID [facility]


   Last Admin: 12/19/15 17:00 Dose:  20 mg


Insulin Human Regular (Humulin R)  0 units SC ACHS [facility]


   PRN Reason: Protocol


   Last Admin: 12/19/15 16:30 Dose:  Not Given


Levetiracetam (Keppra)  500 mg PO BID [facility]


   Last Admin: 12/19/15 17:00 Dose:  500 mg


Lorazepam (Ativan)  2 mg IVP Q4 PRN


   PRN Reason: Agitation


   Last Admin: 12/19/15 16:45 Dose:  2 mg


Morphine Sulfate (Morphine)  2 mg IVP Q4 PRN


   PRN Reason: Pain, moderate (4-7)


   Last Admin: 12/18/15 17:35 Dose:  2 mg


Multivitamins/Vitamin C (Multi-Delyn Liquid)  5 ml PO DAILY [facility]


   Last Admin: 12/19/15 08:29 Dose:  5 ml


Nitroglycerin (Nitro-Bid 2% Oint)  2 in TOP Q6 [facility]


   Last Admin: 12/19/15 16:00 Dose:  2 in











- Labs


Labs (last 24 hours): 


 Laboratory Results - last 24 hr











  12/18/15 12/19/15 12/19/15





  21:55 04:20 04:30


 


WBC   11.3 H 


 


RBC   2.99 L 


 


Hgb   9.6 L 


 


Hct   30.9 L 


 


MCV   103.2 H D 


 


MCH   32.0 H 


 


MCHC   31.0 L 


 


RDW   13.4 


 


Plt Count   401 H 


 


MPV   7.6 


 


Neut % (Auto)   78.6 H 


 


Lymph % (Auto)   9.6 L 


 


Mono % (Auto)   7.2 


 


Eos % (Auto)   3.9 


 


Baso % (Auto)   0.7 


 


Neut #   8.9 H 


 


Lymph #   1.1 


 


Mono #   0.8 


 


Eos #   0.4 


 


Baso #   0.1 


 


Neutrophils % (Manual)   82 H 


 


Lymphocytes % (Manual)   8 L 


 


Monocytes % (Manual)   8 


 


Eosinophils % (Manual)   1 


 


Basophils % (Manual)   1 


 


Platelet Estimate   Slightly increased H 


 


pCO2    46 H


 


pO2    124 H


 


HCO3    31.1 H


 


ABG pH    7.46 H


 


ABG Total CO2    34.1 H


 


ABG O2 Saturation    98.5 H


 


ABG O2 Content    13.7 L


 


ABG Base Excess    7.9 H


 


ABG Hemoglobin    9.9 L


 


ABG Carboxyhemoglobin    1.2


 


POC ABG HHb (Measured)    1.5


 


ABG Methemoglobin    0.8


 


ABG O2 Capacity    13.9 L


 


Chu Test    Yes


 


A-a O2 Difference    246.0


 


Hgb O2 Saturation    96.5


 


FiO2    60.0


 


Sodium   144 


 


Potassium   3.6 


 


Chloride   106 


 


Carbon Dioxide   30 


 


Anion Gap   11 


 


BUN   17 


 


Creatinine   1.1 


 


Est GFR ( Amer)   > 60 


 


Est GFR (Non-Af Amer)   > 60 


 


POC Glucose (mg/dL)  61 L  


 


Random Glucose   94 


 


Calcium   8.5 


 


Phosphorus   3.3 


 


Magnesium   2.0 














  12/19/15 12/19/15





  06:26 17:11


 


WBC  


 


RBC  


 


Hgb  


 


Hct  


 


MCV  


 


MCH  


 


MCHC  


 


RDW  


 


Plt Count  


 


MPV  


 


Neut % (Auto)  


 


Lymph % (Auto)  


 


Mono % (Auto)  


 


Eos % (Auto)  


 


Baso % (Auto)  


 


Neut #  


 


Lymph #  


 


Mono #  


 


Eos #  


 


Baso #  


 


Neutrophils % (Manual)  


 


Lymphocytes % (Manual)  


 


Monocytes % (Manual)  


 


Eosinophils % (Manual)  


 


Basophils % (Manual)  


 


Platelet Estimate  


 


pCO2  


 


pO2  


 


HCO3  


 


ABG pH  


 


ABG Total CO2  


 


ABG O2 Saturation  


 


ABG O2 Content  


 


ABG Base Excess  


 


ABG Hemoglobin  


 


ABG Carboxyhemoglobin  


 


POC ABG HHb (Measured)  


 


ABG Methemoglobin  


 


ABG O2 Capacity  


 


Chu Test  


 


A-a O2 Difference  


 


Hgb O2 Saturation  


 


FiO2  


 


Sodium  


 


Potassium  


 


Chloride  


 


Carbon Dioxide  


 


Anion Gap  


 


BUN  


 


Creatinine  


 


Est GFR ( Amer)  


 


Est GFR (Non-Af Amer)  


 


POC Glucose (mg/dL)  79  72


 


Random Glucose  


 


Calcium  


 


Phosphorus  


 


Magnesium  














- Constitutional


Appears: Toxic





- Head Exam


Head Exam: NORMAL INSPECTION





- Eye Exam


Eye Exam: Normal appearance





- ENT Exam


ENT Exam: Mucous Membranes Moist





- Neck Exam


Neck exam: Full Rom





- Respiratory Exam


Respiratory Exam: Decreased Breath Sounds





- Cardiovascular Exam


Cardiovascular Exam: REGULAR RHYTHM





- GI/Abdominal Exam


GI & Abdominal Exam: Normal Bowel Sounds





- Rectal Exam


Rectal Exam: Deferred





- Extremities Exam


Extremities exam: pedal edema





- Back Exam


Back exam: NORMAL INSPECTION





- Neurological Exam


Neurological exam: Alert, Oriented x3





- Psychiatric Exam


Psychiatric exam: Normal Affect





- Skin


Skin Exam: Normal Color





Assessment/Plan


(1) Cardiac arrest


Assessment and plan: 


 





Patient has no current mental status.  I advise conservative medical therapy. 

No plans for cardiac cath.








Current Visit: Yes   Status: Acute





(2) NSTEMI (non-ST elevated myocardial infarction)


Assessment and plan: 


Patient may have underlying CAD as a cause of his acute event and cardiac 

arrest.  





Conservative therapy.


Current Visit: Yes   Status: Acute





(3) Ventricular tachycardia


Assessment and plan: 


if recurrence recommend amiodarone drip.


Current Visit: Yes   Status: Acute

## 2015-12-19 NOTE — CP.PCM.PN
Subjective





- Subjective


Subjective: 


pt comfortale in bed on vent, prn sedation administered.  nsr on monitor.  pt 

responding to verbal commands. b/w and cxr noted.





Review of Systems





- Review of Systems


Systems not reviewed;Unavailable: Intubated





- Constitutional


Constitutional: UN





- EENT


Eyes: UNREMARKABLE


Ears: UNREMARKABLE


Nose/Mouth/Throat: UNREMARKABLE





- Cardiovascular


Cardiovascular: UNREMARKABLE





- Respiratory


Respiratory: As Per HPI, Chest Congestion, Excessive Mucous Production





- Gastrointestinal


Gastrointestinal: UNREMARKABLE





- Genitourinary


Genitourinary: UNREMARKABLE





- Reproductive: Male


Reproductive:Male: UNREMARKABLE





- Musculoskeletal


Musculoskeletal: UNREMARKABLE





- Integumentary


Integumentary: UNREMARKABLE





- Neurological


Neurological: UNREMARKABLE





- Psychiatric


Psychiatric: UNREMARKABLE





- Endocrine


Endocrine: UNREMARKABLE





- Hematologic/Lymphatic


Hematologic: UNREMARKABLE





Objective





- Vital Signs/Intake and Output


Vital Signs (last 24 hours): 


 Vital Signs - 24 hr











  12/18/15 12/18/15 12/18/15





  10:00 16:00 18:00


 


Temperature  99.0 F 


 


Pulse Rate 88 91 H 95 H


 


Respiratory 12  





Rate   


 


Blood Pressure 130/75 156/95 H 157/89 H


 


O2 Sat by Pulse 100 100 97





Oximetry   














  12/18/15 12/18/15 12/19/15





  20:00 22:00 00:00


 


Temperature 100.2 F H  100.1 F H


 


Pulse Rate 97 H 97 H 96 H


 


Respiratory 18 18 12





Rate   


 


Blood Pressure 162/53 H 168/100 H 174/100 H


 


O2 Sat by Pulse 100 99 99





Oximetry   














  12/19/15 12/19/15 12/19/15





  02:00 04:00 06:00


 


Temperature  99.1 F 


 


Pulse Rate 105 H 99 H 92 H


 


Respiratory 17 13 14





Rate   


 


Blood Pressure 181/100 H 157/89 H 156/91 H


 


O2 Sat by Pulse 97 100 100





Oximetry   














  12/19/15





  07:49


 


Temperature 99.0 F


 


Pulse Rate 103 H


 


Respiratory 14





Rate 


 


Blood Pressure 106/50 L


 


O2 Sat by Pulse 99





Oximetry 











Intake and Output (last 12 hours): 


 Intake & Output











 12/18/15 12/19/15 12/19/15





 18:59 06:59 18:59


 


Intake Total  1000 


 


Output Total 2100 650 


 


Balance -2100 350 


 


Intake:   


 


  IV  750 


 


  Intake, Piggyback  250 


 


Output:   


 


  Urine 2100 650 


 


    Urethral (Mcgee) 2100 650 














- Medications


Medications: 


 Current Medications





Acetaminophen (Tylenol 650mg/20.3ml Solution Ud)  650 mg PO Q6 PRN


   PRN Reason: Fever


   Last Admin: 12/18/15 02:28 Dose:  650 mg


Acetaminophen (Tylenol 650 Mg Supp)  650 mg MI Q6 PRN


   PRN Reason: fevers


   Last Admin: 12/14/15 06:53 Dose:  650 mg


Albuterol/Ipratropium (Duoneb 3 Mg/0.5 Mg (3 Ml) Ud)  3 ml INH RQ6 [facility]


   Last Admin: 12/19/15 07:53 Dose:  3 ml


Albuterol/Ipratropium (Duoneb 3 Mg/0.5 Mg (3 Ml) Ud)  3 ml INH RQ6 [facility]


   Last Admin: 12/19/15 07:53 Dose:  Not Given


Aspirin (Ecotrin)  81 mg PO DAILY [facility]


   Last Admin: 12/18/15 08:05 Dose:  81 mg


Enalapril Maleate (Vasotec)  10 mg PO DAILY [facility]


   Last Admin: 12/18/15 08:07 Dose:  10 mg


Famotidine (Pepcid)  20 mg GT BID [facility]


   Last Admin: 12/18/15 16:27 Dose:  20 mg


Vancomycin HCl 1 gm/ Sodium (Chloride)  250 mls @ 125 mls/hr IVPB Q12 [facility]


   Last Admin: 12/18/15 20:46 Dose:  125 mls/hr


Insulin Human Regular (Humulin R)  0 units SC ACHS [facility]


   PRN Reason: Protocol


   Last Admin: 12/18/15 16:30 Dose:  Not Given


Levetiracetam (Keppra)  500 mg PO BID [facility]


   Last Admin: 12/18/15 17:00 Dose:  500 mg


Lorazepam (Ativan)  2 mg IVP Q4 PRN


   PRN Reason: Agitation


   Last Admin: 12/18/15 18:15 Dose:  2 mg


Morphine Sulfate (Morphine)  2 mg IVP Q4 PRN


   PRN Reason: Pain, moderate (4-7)


   Last Admin: 12/18/15 17:35 Dose:  2 mg


Multivitamins/Vitamin C (Multi-Delyn Liquid)  5 ml PO DAILY [facility]


   Last Admin: 12/18/15 08:07 Dose:  5 ml


Nitroglycerin (Nitro-Bid 2% Oint)  2 in TOP Q6 [facility]


   Last Admin: 12/18/15 16:27 Dose:  2 in











- Labs


Labs (last 24 hours): 


 Laboratory Results - last 24 hr











  12/18/15 12/18/15 12/19/15





  17:31 21:55 04:20


 


WBC    11.3 H


 


RBC    2.99 L


 


Hgb    9.6 L


 


Hct    30.9 L


 


MCV    103.2 H D


 


MCH    32.0 H


 


MCHC    31.0 L


 


RDW    13.4


 


Plt Count    401 H


 


MPV    7.6


 


Neut % (Auto)    78.6 H


 


Lymph % (Auto)    9.6 L


 


Mono % (Auto)    7.2


 


Eos % (Auto)    3.9


 


Baso % (Auto)    0.7


 


Neut #    8.9 H


 


Lymph #    1.1


 


Mono #    0.8


 


Eos #    0.4


 


Baso #    0.1


 


Neutrophils % (Manual)    82 H


 


Lymphocytes % (Manual)    8 L


 


Monocytes % (Manual)    8


 


Eosinophils % (Manual)    1


 


Basophils % (Manual)    1


 


Platelet Estimate    Slightly increased H


 


pCO2   


 


pO2   


 


HCO3   


 


ABG pH   


 


ABG Total CO2   


 


ABG O2 Saturation   


 


ABG O2 Content   


 


ABG Base Excess   


 


ABG Hemoglobin   


 


ABG Carboxyhemoglobin   


 


POC ABG HHb (Measured)   


 


ABG Methemoglobin   


 


ABG O2 Capacity   


 


Chu Test   


 


A-a O2 Difference   


 


Hgb O2 Saturation   


 


FiO2   


 


Sodium    144


 


Potassium    3.6


 


Chloride    106


 


Carbon Dioxide    30


 


Anion Gap    11


 


BUN    17


 


Creatinine    1.1


 


Est GFR ( Amer)    > 60


 


Est GFR (Non-Af Amer)    > 60


 


POC Glucose (mg/dL)  73  61 L 


 


Random Glucose    94


 


Calcium    8.5


 


Phosphorus    3.3


 


Magnesium    2.0














  12/19/15 12/19/15





  04:30 06:26


 


WBC  


 


RBC  


 


Hgb  


 


Hct  


 


MCV  


 


MCH  


 


MCHC  


 


RDW  


 


Plt Count  


 


MPV  


 


Neut % (Auto)  


 


Lymph % (Auto)  


 


Mono % (Auto)  


 


Eos % (Auto)  


 


Baso % (Auto)  


 


Neut #  


 


Lymph #  


 


Mono #  


 


Eos #  


 


Baso #  


 


Neutrophils % (Manual)  


 


Lymphocytes % (Manual)  


 


Monocytes % (Manual)  


 


Eosinophils % (Manual)  


 


Basophils % (Manual)  


 


Platelet Estimate  


 


pCO2  46 H 


 


pO2  124 H 


 


HCO3  31.1 H 


 


ABG pH  7.46 H 


 


ABG Total CO2  34.1 H 


 


ABG O2 Saturation  98.5 H 


 


ABG O2 Content  13.7 L 


 


ABG Base Excess  7.9 H 


 


ABG Hemoglobin  9.9 L 


 


ABG Carboxyhemoglobin  1.2 


 


POC ABG HHb (Measured)  1.5 


 


ABG Methemoglobin  0.8 


 


ABG O2 Capacity  13.9 L 


 


Chu Test  Yes 


 


A-a O2 Difference  246.0 


 


Hgb O2 Saturation  96.5 


 


FiO2  60.0 


 


Sodium  


 


Potassium  


 


Chloride  


 


Carbon Dioxide  


 


Anion Gap  


 


BUN  


 


Creatinine  


 


Est GFR ( Amer)  


 


Est GFR (Non-Af Amer)  


 


POC Glucose (mg/dL)   79


 


Random Glucose  


 


Calcium  


 


Phosphorus  


 


Magnesium  














- Constitutional


Appears: Well, Non-toxic, No Acute Distress, Chronically Ill





- Head Exam


Head Exam: ATRAUMATIC, NORMAL INSPECTION, NORMOCEPHALIC





- Eye Exam


Eye Exam: EOMI





- Respiratory Exam


Respiratory Exam: Clear to PA & Lateral


Additional comments: 


vented


diffuse coarse breath sounds





- Cardiovascular Exam


Cardiovascular Exam: REGULAR RHYTHM, RRR





- GI/Abdominal Exam


GI & Abdominal Exam: Normal Bowel Sounds, Soft, Unremarkable





- Extremities Exam


Extremities exam: full ROM, normal capillary refill, normal inspection, pedal 

pulses present





- Back Exam


Back exam: FULL ROM





- Skin


Skin Exam: Dry, Intact, Normal Color, Warm





Assessment/Plan


(1) Cardiac arrest


Current Visit: Yes   Status: Acute


Comment: s/p acls proceudres, ST w/ periods of SB


echo-noted


cardio consult


pt on prn meds for agitation


hr remains elevated unsure if this is related to fever, agitation or other 

etiology-trending down


s/p cooling protocol


no furthe VT


no cardiac intervention planned at present








(2) DVT prophylaxis


Current Visit: Yes   Status: Acute


Comment: lovenox








(3) Scrotal edema


Current Visit: Yes   Status: Acute


Comment: scrotal us-noted


urology consult-dr cortes to see pt when stable








(4) History of seizure


Current Visit: Yes   Status: Acute


Comment: neuro consult


?? eeg


started on keppra








(5) Diabetes mellitus with hyperglycemia


Current Visit: Yes   Status: Acute


Comment: riss per protocol








(6) Agitation


Current Visit: Yes   Status: Acute


Comment: cont propofol for agitation


unsure if this is lower brainstem activity vs seizure activity vs other etiology


now on ativan/morephin prn, propofol d/c








(7) UTI (urinary tract infection)


Current Visit: Yes   Status: Acute


Comment: cont anbx


mcgee cath


uro consult pending








(8) Aspiration pneumonia


Current Visit: Yes   Status: Acute


Comment: worsened cxr, cont anbx, ID condult, trach placement


tmax lower, on anbx


pending guardianship for trach placement











- Assessment and Plan (Free Text)


Assessment: 


doign well on weaning parameters, will cont per icu standards

## 2015-12-19 NOTE — PN
DATE: 12/19/2015



The patient in ICU, bed 434.



TIME SPENT:  35 minutes.



Events last 24 hours reviewed.  The patient off sedation, noted to be spontaneously awakening, eyes o
pen.  The patient seen, asked to open and close his eyes through a , seems like fol
lowing commands.  Yesterday, tried to liberate from mechanical ventilation.  On T-piece, the patient 
noted to become agitated with increasing heart rate.  Back on assist control 16, on AC/PRVC 12/600, F
IO2 60%, observed tidal volume 580, PEEP 5, minute ventilation 6.9 liters, saturating 100%.



PHYSICAL EXAMINATION:

VITAL SIGNS:  Temperature 99, heart rate of 103, regular, respiratory rate 14, blood pressure 106/50,
 end tidal CO2 18.

HEENT:  Pupils reactive.  No icterus, no nystagmus.  Pupillary reaction normal.

NECK:  Supple.  No carotid bruit.

CHEST:  Bilateral breath sounds diminished in intensity.  No adventitious sounds.

HEART:  Rhythm regular.  S1, S2 normal intensity.  No audible murmur.

ABDOMEN:  Bowel sounds present, soft.  Liver, spleen not palpable.

EXTREMITIES:  Shows bilateral edema.  Venodyne boots in place on the lower extremities.  Dorsalis ped
is palpable.  No palpable cord.

SKIN:  No rash noted.



CURRENT MEDICATIONS:  Tylenol 650 q. 6 p.r.n., DuoNeb 3 mL q. 6, aspirin 81 mg daily, Vasotec 10 mg p
.o. daily, Pepcid 20 mg daily, Keppra 500 mg b.i.d., Ativan 2 mg IV q. 4 p.r.n. for agitation, morphi
ne 2 mg IV q. 4 p.r.n. for pain, nitroglycerin 2% 2 inch topically q. 6 hours.



LABORATORY DATA:  WBC 11.3, hemoglobin 9.6, hematocrit 30.9, platelet count 402, neutrophils 78.6, ly
mphocytes 9.6, monocytes 7.2.  PT 11.2, INR 1.05, PTT 36.2.  ABG:  pH 7.46, pCO2 46, pO2 124 on FIO2 
60%.  SMA-7:  Sodium 144, potassium 3.6, chloride 106, CO2 30, BUN 17 and creatinine 1.1, calcium 8.5
, phosphorus 3.3, magnesium 2.  Urinalysis is negative.  Chest x-ray:  Endotracheal tube in place, ha
ziness left upper lobe persists, also on the mild haziness on the right lung unchanged.



IMPRESSION:  Day 12 on mechanical ventilation, being weaned off as patient is becoming more wakeful o
ff Diprivan, anoxic encephalopathy still possible, but more wakeful than before, status post aspirati
on.



PLAN:  Continue weaning as tolerated.  Antibiotic as per infectious disease consult to cover for Kleb
siella on the sputum culture and with new temperature spikes and leukocytosis secondary to ventilator
 associated pneumonia.  No seizure noted on the EEG on Keppra.  Supplement electrolytes.  Continue na
sogastric tube feeding.





__________________________________________

Samuel Courtney MD







cc:



DD: 12/19/2015 11:20:38  170

TT: 12/19/2015 12:05:02

Job # [id number]

en

## 2015-12-19 NOTE — RAD
HISTORY:

Pt's intubated, check placement  



COMPARISON:

No prior. 



FINDINGS:



LUNGS:

Endotracheal tube in satisfactory positioning. Left-sided central 

venous catheter projecting over the SVC. Feeding tube with the tip 

below the diaphragm.



Diffuse bilateral airspace opacities with complete obscuration of the 

left hemidiaphragm again seen, appears mildly improved in comparison 

to prior exam from 12/18/2015.



CARDIOVASCULAR:

Cardiomegaly.



OSSEOUS STRUCTURES:

Degenerative changes of the shoulders, left greater than right. 

Degenerative changes of the spine.



VISUALIZED UPPER ABDOMEN:

Normal.



OTHER FINDINGS:

None.



IMPRESSION:

Tubes and lines in satisfactory positioning.



Bilateral airspace opacities with complete obscuration of the left 

hemidiaphragm, slightly improved in comparison to prior study from 

12/18/2015.

## 2015-12-20 LAB
ALBUMIN SERPL-MCNC: 3 G/DL (ref 3.5–5)
ALBUMIN/GLOB SERPL: 0.8 {RATIO} (ref 1–2.1)
ALT SERPL-CCNC: 36 U/L (ref 21–72)
ARTERIAL BLOOD GAS HEMOGLOBIN: 12.2 G/DL (ref 11.7–17.4)
ARTERIAL BLOOD GAS O2 SAT: 98.2 % (ref 95–98)
ARTERIAL BLOOD GAS PCO2: 42 MM/HG (ref 35–45)
ARTERIAL BLOOD GAS TCO2: 31.2 MMOL/L (ref 22–28)
ARTERIAL PATENCY WRIST A: YES
AST SERPL-CCNC: 42 U/L (ref 17–59)
BASOPHILS # BLD AUTO: 0.1 K/UL (ref 0–0.2)
BASOPHILS NFR BLD: 0.8 % (ref 0–2)
BUN SERPL-MCNC: 17 MG/DL (ref 9–20)
CALCIUM SERPL-MCNC: 8.6 MG/DL (ref 8.4–10.2)
EOSINOPHIL # BLD AUTO: 0.4 K/UL (ref 0–0.7)
EOSINOPHIL NFR BLD: 3.2 % (ref 0–4)
ERYTHROCYTE [DISTWIDTH] IN BLOOD BY AUTOMATED COUNT: 13.2 % (ref 11.5–14.5)
GFR NON-AFRICAN AMERICAN: > 60
HCO3 BLDA-SCNC: 29.2 MMOL/L (ref 21–28)
HGB BLD-MCNC: 10 G/DL (ref 12–18)
INHALED O2 CONCENTRATION: 60 %
LYMPHOCYTES # BLD AUTO: 1.1 K/UL (ref 1–4.3)
LYMPHOCYTES NFR BLD AUTO: 9.7 % (ref 20–40)
MCH RBC QN AUTO: 32.4 PG (ref 27–31)
MCHC RBC AUTO-ENTMCNC: 31.6 G/DL (ref 33–37)
MCV RBC AUTO: 102.7 FL (ref 80–94)
MONOCYTES # BLD: 0.8 K/UL (ref 0–0.8)
MONOCYTES NFR BLD: 7.1 % (ref 0–10)
NEUTROPHILS # BLD: 9.4 K/UL (ref 1.8–7)
NEUTROPHILS NFR BLD AUTO: 79.2 % (ref 50–75)
NRBC BLD AUTO-RTO: 0 % (ref 0–0)
O2 CAP BLDA-SCNC: 16.9 ML/DL (ref 16–24)
O2 CT BLDA-SCNC: 16.6 ML/DL (ref 15–23)
PH BLDA: 7.46 [PH] (ref 7.35–7.45)
PLATELET # BLD: 485 K/UL (ref 130–400)
PMV BLD AUTO: 8.2 FL (ref 7.2–11.7)
PO2 BLDA: 101 MM/HG (ref 80–100)
RBC # BLD AUTO: 3.09 MIL/UL (ref 4.4–5.9)
WBC # BLD AUTO: 11.8 K/UL (ref 4.8–10.8)

## 2015-12-20 RX ADMIN — CIPROFLOXACIN SCH MLS/HR: 2 INJECTION, SOLUTION INTRAVENOUS at 20:48

## 2015-12-20 RX ADMIN — IPRATROPIUM BROMIDE AND ALBUTEROL SULFATE SCH: .5; 3 SOLUTION RESPIRATORY (INHALATION) at 14:19

## 2015-12-20 RX ADMIN — NITROGLYCERIN SCH IN: 20 OINTMENT TOPICAL at 16:44

## 2015-12-20 RX ADMIN — IPRATROPIUM BROMIDE AND ALBUTEROL SULFATE SCH ML: .5; 3 SOLUTION RESPIRATORY (INHALATION) at 14:19

## 2015-12-20 RX ADMIN — IPRATROPIUM BROMIDE AND ALBUTEROL SULFATE SCH ML: .5; 3 SOLUTION RESPIRATORY (INHALATION) at 01:02

## 2015-12-20 RX ADMIN — NITROGLYCERIN SCH IN: 20 OINTMENT TOPICAL at 09:00

## 2015-12-20 RX ADMIN — IPRATROPIUM BROMIDE AND ALBUTEROL SULFATE SCH ML: .5; 3 SOLUTION RESPIRATORY (INHALATION) at 19:31

## 2015-12-20 RX ADMIN — LEVETIRACETAM SCH MG: 100 SOLUTION ORAL at 16:44

## 2015-12-20 RX ADMIN — IPRATROPIUM BROMIDE AND ALBUTEROL SULFATE SCH ML: .5; 3 SOLUTION RESPIRATORY (INHALATION) at 08:24

## 2015-12-20 RX ADMIN — Medication SCH ML: at 09:00

## 2015-12-20 RX ADMIN — LEVETIRACETAM SCH MG: 100 SOLUTION ORAL at 08:58

## 2015-12-20 RX ADMIN — IPRATROPIUM BROMIDE AND ALBUTEROL SULFATE SCH: .5; 3 SOLUTION RESPIRATORY (INHALATION) at 08:24

## 2015-12-20 RX ADMIN — NITROGLYCERIN SCH IN: 20 OINTMENT TOPICAL at 22:54

## 2015-12-20 RX ADMIN — NITROGLYCERIN SCH IN: 20 OINTMENT TOPICAL at 06:56

## 2015-12-20 NOTE — CP.PCM.PN
Subjective





- Subjective


Subjective: 


pt in bed on vent simv setting. pt slightly restless but calms to verbal 

reassurance. opens eyes and respons to simple commands.  no fc, anbx had been d/

c and only cipro restarted by dr sterling.  b/w and imaging noted. 





Review of Systems





- Review of Systems


Systems not reviewed;Unavailable: Intubated





- Constitutional


Constitutional: UN





- EENT


Eyes: UNREMARKABLE


Ears: UNREMARKABLE


Nose/Mouth/Throat: UNREMARKABLE





- Cardiovascular


Cardiovascular: UNREMARKABLE





- Respiratory


Respiratory: UNREMARKABLE





- Gastrointestinal


Gastrointestinal: UNREMARKABLE





- Genitourinary


Genitourinary: UNREMARKABLE





- Reproductive: Male


Reproductive:Male: UNREMARKABLE





- Musculoskeletal


Musculoskeletal: UNREMARKABLE





- Integumentary


Integumentary: UNREMARKABLE





- Neurological


Neurological: UNREMARKABLE





- Psychiatric


Psychiatric: UNREMARKABLE





- Endocrine


Endocrine: UNREMARKABLE





- Hematologic/Lymphatic


Hematologic: UNREMARKABLE





Objective





- Vital Signs/Intake and Output


Vital Signs (last 24 hours): 


 Vital Signs - 24 hr











  12/19/15 12/19/15 12/19/15





  18:00 20:00 22:00


 


Temperature  99.2 F 


 


Pulse Rate 95 H 82 107 H


 


Respiratory  16 17





Rate   


 


Blood Pressure 163/97 H 161/103 H 170/100 H


 


O2 Sat by Pulse 98 100 100





Oximetry   














  12/20/15 12/20/15 12/20/15





  02:00 04:00 06:00


 


Temperature  98.7 F 


 


Pulse Rate 99 H 82 82


 


Respiratory 18 13 12





Rate   


 


Blood Pressure 171/107 H 157/91 H 143/85


 


O2 Sat by Pulse 100 99 100





Oximetry   














  12/20/15 12/20/15 12/20/15





  08:00 09:00 09:52


 


Temperature 98.5 F  


 


Pulse Rate 86 96 H 108 H


 


Respiratory 18  22





Rate   


 


Blood Pressure 152/91 H 146/78 157/76 H


 


O2 Sat by Pulse 100  100





Oximetry   














  12/20/15 12/20/15 12/20/15





  11:35 11:59 12:00


 


Temperature   98.8 F


 


Pulse Rate 165 H 160 H 148 H


 


Respiratory   17





Rate   


 


Blood Pressure 140/72 140/72 140/72


 


O2 Sat by Pulse   100





Oximetry   














  12/20/15 12/20/15 12/20/15





  14:00 15:12 16:12


 


Temperature   99.1 F


 


Pulse Rate 77 133 H 136 H


 


Respiratory 18 16 14





Rate   


 


Blood Pressure 160/75 H 113/76 150/73


 


O2 Sat by Pulse 99 100 100





Oximetry   











Intake and Output (last 12 hours): 


 Intake & Output











 12/19/15 12/20/15 12/20/15





 18:59 06:59 18:59


 


Intake Total 7749 654 7824


 


Output Total 1000  900


 


Balance 350 750 295


 


Intake:   


 


   750 475


 


  Intake, Piggyback 250  


 


  Oral   100


 


  Tube Feeding   420


 


  Free Water Flush 200  200


 


Output:   


 


  Urine 1000  900


 


    Urethral (Mcgee) 1000  900


 


  Stool 0  














- Medications


Medications: 


 Current Medications





Acetaminophen (Tylenol 650mg/20.3ml Solution Ud)  650 mg PO Q6 PRN


   PRN Reason: Fever


   Last Admin: 12/18/15 02:28 Dose:  650 mg


Acetaminophen (Tylenol 650 Mg Supp)  650 mg CO Q6 PRN


   PRN Reason: fevers


   Last Admin: 12/14/15 06:53 Dose:  650 mg


Albuterol/Ipratropium (Duoneb 3 Mg/0.5 Mg (3 Ml) Ud)  3 ml INH RQ6 [facility]


   Last Admin: 12/20/15 14:19 Dose:  3 ml


Aspirin (Ecotrin)  81 mg PO DAILY [facility]


   Last Admin: 12/20/15 08:58 Dose:  81 mg


Enalapril Maleate (Vasotec)  10 mg PO DAILY [facility]


   Last Admin: 12/20/15 09:01 Dose:  10 mg


Famotidine (Pepcid)  20 mg GT BID [facility]


   Last Admin: 12/20/15 09:01 Dose:  20 mg


Diltiazem HCl 125 mg/ Sodium (Chloride)  125 mls @ 10 mls/hr IV .[id number] ONE; 

10 MG/HR


   PRN Reason: Protocol


   Stop: 12/21/15 00:04


   Last Admin: 12/20/15 12:07 Dose:  10 mls/hr


Propofol (Diprivan)  100 mls @ 2.041 mls/hr IV .Q24H [facility]; 5 MCG/KG/MIN


   PRN Reason: Protocol


   Stop: 12/21/15 12:03


   Last Admin: 12/20/15 12:35 Dose:  2.041 mls/hr


Ciprofloxacin (Cipro 400mg/200ml Dsw)  200 mls @ 200 mls/hr IVPB Q12 [facility]


Insulin Human Regular (Humulin R)  0 units SC ACHS [facility]


   PRN Reason: Protocol


   Last Admin: 12/20/15 11:23 Dose:  Not Given


Levetiracetam (Keppra)  500 mg PO BID [facility]


   Last Admin: 12/20/15 08:58 Dose:  500 mg


Metoprolol Tartrate (Lopressor)  25 mg PO Q12 [facility]


   Last Admin: 12/20/15 09:00 Dose:  25 mg


Morphine Sulfate (Morphine)  2 mg IVP Q4 PRN


   PRN Reason: Pain, moderate (4-7)


   Last Admin: 12/20/15 11:02 Dose:  2 mg


Multivitamins/Vitamin C (Multi-Delyn Liquid)  5 ml PO DAILY [facility]


   Last Admin: 12/20/15 09:00 Dose:  5 ml


Nitroglycerin (Nitro-Bid 2% Oint)  2 in TOP Q6 [facility]


   Last Admin: 12/20/15 09:00 Dose:  2 in











- Labs


Labs (last 24 hours): 


 Laboratory Results - last 24 hr











  12/19/15 12/19/15 12/20/15





  17:11 21:22 04:30


 


WBC    11.8 H


 


RBC    3.09 L


 


Hgb    10.0 L


 


Hct    31.8 L


 


MCV    102.7 H


 


MCH    32.4 H


 


MCHC    31.6 L


 


RDW    13.2


 


Plt Count    485 H


 


MPV    8.2


 


Neut % (Auto)    79.2 H


 


Lymph % (Auto)    9.7 L


 


Mono % (Auto)    7.1


 


Eos % (Auto)    3.2


 


Baso % (Auto)    0.8


 


Neut #    9.4 H


 


Lymph #    1.1


 


Mono #    0.8


 


Eos #    0.4


 


Baso #    0.1


 


Sodium    138


 


Potassium    3.7


 


Chloride    100


 


Carbon Dioxide    30


 


Anion Gap    12


 


BUN    17


 


Creatinine    0.9


 


Est GFR ( Amer)    > 60


 


Est GFR (Non-Af Amer)    > 60


 


POC Glucose (mg/dL)  72  75 


 


Random Glucose    81


 


Calcium    8.6


 


Total Bilirubin    0.6


 


AST    42


 


ALT    36


 


Alkaline Phosphatase    99


 


Total Protein    6.7


 


Albumin    3.0 L


 


Globulin    3.6


 


Albumin/Globulin Ratio    0.8 L














  12/20/15 12/20/15





  05:43 11:21


 


WBC  


 


RBC  


 


Hgb  


 


Hct  


 


MCV  


 


MCH  


 


MCHC  


 


RDW  


 


Plt Count  


 


MPV  


 


Neut % (Auto)  


 


Lymph % (Auto)  


 


Mono % (Auto)  


 


Eos % (Auto)  


 


Baso % (Auto)  


 


Neut #  


 


Lymph #  


 


Mono #  


 


Eos #  


 


Baso #  


 


Sodium  


 


Potassium  


 


Chloride  


 


Carbon Dioxide  


 


Anion Gap  


 


BUN  


 


Creatinine  


 


Est GFR ( Amer)  


 


Est GFR (Non-Af Amer)  


 


POC Glucose (mg/dL)  78  88


 


Random Glucose  


 


Calcium  


 


Total Bilirubin  


 


AST  


 


ALT  


 


Alkaline Phosphatase  


 


Total Protein  


 


Albumin  


 


Globulin  


 


Albumin/Globulin Ratio  














- Constitutional


Appears: Non-toxic, No Acute Distress, Chronically Ill





- Head Exam


Head Exam: ATRAUMATIC, NORMAL INSPECTION, NORMOCEPHALIC





- Eye Exam


Eye Exam: EOMI





- Respiratory Exam


Respiratory Exam: Clear to PA & Lateral, UNREMARKABLE


Additional comments: 


vented, on simv, breathing over rent





- Cardiovascular Exam


Cardiovascular Exam: Tachycardia, REGULAR RHYTHM, RRR





- GI/Abdominal Exam


GI & Abdominal Exam: Normal Bowel Sounds, Soft (d), Unremarkable





- Extremities Exam


Extremities exam: full ROM, normal capillary refill, normal inspection, pedal 

pulses present





- Neurological Exam


Additional comments: 


responds to simple commands/verbal reassurance





- Skin


Skin Exam: Dry, Intact, Normal Color, Warm





Assessment/Plan


(1) Cardiac arrest


Current Visit: Yes   Status: Acute


Comment: hr noted in nursing notes to be fluctuating


cardizem restarted for tachycardia. 


cardio on board and pt is w/o further episodes of VT


no cardio interventions planned at present.








(2) DVT prophylaxis


Current Visit: Yes   Status: Acute


Comment: lovenox








(3) Scrotal edema


Current Visit: Yes   Status: Acute


Comment: scrotal us-noted


urology consult-dr cortes to see pt when stable








(4) History of seizure


Current Visit: Yes   Status: Acute


Comment: neuro consult


?? eeg


started on keppra








(5) Diabetes mellitus with hyperglycemia


Current Visit: Yes   Status: Acute


Comment: riss per protocol








(6) Agitation


Current Visit: Yes   Status: Acute


Comment: pt restarted on propofol for agitation today








(7) UTI (urinary tract infection)


Current Visit: Yes   Status: Acute


Comment: cont anbx


mcgee cath


uro consult pending








(8) Aspiration pneumonia


Current Visit: Yes   Status: Acute


Comment: slightly improved cxr, restrated on ly on cipro


monitor cxr











- Assessment and Plan (Free Text)


Assessment: 


pt still working on weaning protocols, pending guardian from state.

## 2015-12-20 NOTE — CP.PCM.PN
Subjective





- Subjective


Subjective: 


Patient seen this am.  No clinical change.  unable to wean.





Review of Systems





- Review of Systems


Systems not reviewed;Unavailable: Intubated





Objective





- Vital Signs/Intake and Output


Vital Signs (last 24 hours): 


 Vital Signs - 24 hr











  12/19/15 12/19/15 12/19/15





  07:49 12:00 16:00


 


Temperature 99.0 F 99.0 F 99.2 F


 


Pulse Rate 103 H 94 H 93 H


 


Respiratory 14  





Rate   


 


Blood Pressure 106/50 L 166/100 H 151/99 H


 


O2 Sat by Pulse 99 100 98





Oximetry   














  12/19/15 12/19/15 12/19/15





  18:00 20:00 22:00


 


Temperature  99.2 F 


 


Pulse Rate 95 H 82 107 H


 


Respiratory  16 17





Rate   


 


Blood Pressure 163/97 H 161/103 H 170/100 H


 


O2 Sat by Pulse 98 100 100





Oximetry   











Intake and Output (last 12 hours): 


 Intake & Output











 12/19/15 12/19/15 12/20/15





 06:59 18:59 06:59


 


Intake Total 1000 1350 300


 


Output Total 650 1000 


 


Balance 350 350 300


 


Intake:   


 


   900 300


 


  Intake, Piggyback 250 250 


 


  Free Water Flush  200 


 


Output:   


 


  Urine 650 1000 


 


    Urethral (Lua) 650 1000 


 


  Stool  0 














- Medications


Medications: 


 Current Medications





Acetaminophen (Tylenol 650mg/20.3ml Solution Ud)  650 mg PO Q6 PRN


   PRN Reason: Fever


   Last Admin: 12/18/15 02:28 Dose:  650 mg


Acetaminophen (Tylenol 650 Mg Supp)  650 mg [place] Q6 PRN


   PRN Reason: fevers


   Last Admin: 12/14/15 06:53 Dose:  650 mg


Albuterol/Ipratropium (Duoneb 3 Mg/0.5 Mg (3 Ml) Ud)  3 ml INH RQ6 [facility]


   Last Admin: 12/20/15 01:02 Dose:  3 ml


Albuterol/Ipratropium (Duoneb 3 Mg/0.5 Mg (3 Ml) Ud)  3 ml INH RQ6 [facility]


   Last Admin: 12/19/15 19:42 Dose:  Not Given


Aspirin (Ecotrin)  81 mg PO DAILY [facility]


   Last Admin: 12/19/15 08:28 Dose:  81 mg


Enalapril Maleate (Vasotec)  10 mg PO DAILY [facility]


   Last Admin: 12/19/15 08:30 Dose:  10 mg


Famotidine (Pepcid)  20 mg GT BID [facility]


   Last Admin: 12/19/15 17:00 Dose:  20 mg


Insulin Human Regular (Humulin R)  0 units SC ACHS [facility]


   PRN Reason: Protocol


   Last Admin: 12/19/15 21:24 Dose:  Not Given


Levetiracetam (Keppra)  500 mg PO BID [facility]


   Last Admin: 12/19/15 17:00 Dose:  500 mg


Lorazepam (Ativan)  2 mg IVP Q4 PRN


   PRN Reason: Agitation


   Last Admin: 12/19/15 21:15 Dose:  2 mg


Metoprolol Tartrate (Lopressor)  25 mg PO Q12 [facility]


Morphine Sulfate (Morphine)  2 mg IVP Q4 PRN


   PRN Reason: Pain, moderate (4-7)


   Last Admin: 12/19/15 23:17 Dose:  2 mg


Multivitamins/Vitamin C (Multi-Delyn Liquid)  5 ml PO DAILY [facility]


   Last Admin: 12/19/15 08:29 Dose:  5 ml


Nitroglycerin (Nitro-Bid 2% Oint)  2 in TOP Q6 [facility]


   Last Admin: 12/19/15 21:05 Dose:  2 in











- Labs


Labs (last 24 hours): 


 Laboratory Results - last 24 hr











  12/19/15 12/19/15 12/19/15





  04:20 17:11 21:22


 


Neutrophils % (Manual)  82 H  


 


Lymphocytes % (Manual)  8 L  


 


Monocytes % (Manual)  8  


 


Eosinophils % (Manual)  1  


 


Basophils % (Manual)  1  


 


Platelet Estimate  Slightly increased H  


 


POC Glucose (mg/dL)   72  75














  12/20/15





  05:43


 


Neutrophils % (Manual) 


 


Lymphocytes % (Manual) 


 


Monocytes % (Manual) 


 


Eosinophils % (Manual) 


 


Basophils % (Manual) 


 


Platelet Estimate 


 


POC Glucose (mg/dL)  78














- Constitutional


Appears: Toxic





- Head Exam


Head Exam: NORMAL INSPECTION





- Eye Exam


Eye Exam: Normal appearance





- ENT Exam


ENT Exam: Mucous Membranes Moist





- Neck Exam


Neck exam: Full Rom





- Respiratory Exam


Respiratory Exam: Decreased Breath Sounds





- Cardiovascular Exam


Cardiovascular Exam: REGULAR RHYTHM





- GI/Abdominal Exam


GI & Abdominal Exam: Normal Bowel Sounds





- Rectal Exam


Rectal Exam: Deferred





- Extremities Exam


Extremities exam: pedal edema





- Back Exam


Back exam: NORMAL INSPECTION





- Skin


Skin Exam: Normal Color





Assessment/Plan


(1) Cardiac arrest


Assessment and plan: 


 





Patient has no current mental status.  I advise conservative medical therapy. 

No plans for cardiac cath.








Current Visit: Yes   Status: Acute





(2) NSTEMI (non-ST elevated myocardial infarction)


Assessment and plan: 


Patient may have underlying CAD as a cause of his acute event and cardiac 

arrest.  





Conservative therapy.


Current Visit: Yes   Status: Acute





(3) Ventricular tachycardia


Assessment and plan: 


if recurrence recommend amiodarone drip.


Current Visit: Yes   Status: Acute





(4) HTN (hypertension)


Assessment and plan: 


add metoprolol


Current Visit: Yes   Status: Acute

## 2015-12-20 NOTE — RAD
HISTORY:

Pt's intubated, to check ETT placement  



COMPARISON:

12/19/2015 



FINDINGS:



LUNGS:

Endotracheal tube and feeding tube are in satisfactory positioning. 

Right-sided central venous catheter projecting over the SVC.



Stable bilateral airspace opacities are seen. Stable left basilar 

opacity. No definite pleural effusion or pneumothorax. 



CARDIOVASCULAR:

Cardiomegaly.



OSSEOUS STRUCTURES:

Degenerative changes of the left shoulder.



VISUALIZED UPPER ABDOMEN:

Normal.



OTHER FINDINGS:

None.



IMPRESSION:

Tubes and lines unchanged.



Stable bilateral airspace opacities likely representing diffuse 

pneumonia, pulmonary edema or ARDS.

## 2015-12-20 NOTE — CP.PCM.PN
Subjective





- Subjective


Subjective: 


AWAKE AT TIMES


GETS AGITATED


RESPONDS TO SIMPLE QUESTIONS IN [place]  BY NODDING


AFEBRILE


CXR STILL SHOWS BILATERAL INFILTRATES


IV RX REORDERED


VANCO HAS BEEN DISCONTINUED





Review of Systems





- Review of Systems


All systems: reviewed and no additional remarkable complaints except





- Constitutional


Constitutional: absent: Fever





Objective





- Vital Signs/Intake and Output


Vital Signs (last 24 hours): 


 Vital Signs - 24 hr











  12/19/15 12/19/15 12/19/15





  16:00 18:00 20:00


 


Temperature 99.2 F  99.2 F


 


Pulse Rate 93 H 95 H 82


 


Respiratory   16





Rate   


 


Blood Pressure 151/99 H 163/97 H 161/103 H


 


O2 Sat by Pulse 98 98 100





Oximetry   














  12/19/15 12/20/15 12/20/15





  22:00 02:00 04:00


 


Temperature   98.7 F


 


Pulse Rate 107 H 99 H 82


 


Respiratory 17 18 13





Rate   


 


Blood Pressure 170/100 H 171/107 H 157/91 H


 


O2 Sat by Pulse 100 100 99





Oximetry   














  12/20/15 12/20/15 12/20/15





  06:00 08:00 09:00


 


Temperature  98.5 F 


 


Pulse Rate 82 86 96 H


 


Respiratory 12 18 





Rate   


 


Blood Pressure 143/85 152/91 H 146/78


 


O2 Sat by Pulse 100 100 





Oximetry   














  12/20/15 12/20/15 12/20/15





  09:52 11:35 11:59


 


Temperature   


 


Pulse Rate 108 H 165 H 160 H


 


Respiratory 22  





Rate   


 


Blood Pressure 157/76 H 140/72 140/72


 


O2 Sat by Pulse 100  





Oximetry   














  12/20/15 12/20/15





  12:00 14:00


 


Temperature 98.8 F 


 


Pulse Rate 148 H 77


 


Respiratory 17 18





Rate  


 


Blood Pressure 140/72 160/75 H


 


O2 Sat by Pulse 100 99





Oximetry  











Intake and Output (last 12 hours): 


 Intake & Output











 12/19/15 12/20/15 12/20/15





 18:59 06:59 18:59


 


Intake Total 1350 750 200


 


Output Total 1000  


 


Balance 350 750 200


 


Intake:   


 


   750 


 


  Intake, Piggyback 250  


 


  Oral   100


 


  Free Water Flush 200  100


 


Output:   


 


  Urine 1000  


 


    Urethral (Lua) 1000  


 


  Stool 0  














- Medications


Medications: 


 Current Medications





Acetaminophen (Tylenol 650mg/20.3ml Solution Ud)  650 mg PO Q6 PRN


   PRN Reason: Fever


   Last Admin: 12/18/15 02:28 Dose:  650 mg


Acetaminophen (Tylenol 650 Mg Supp)  650 mg [place] Q6 PRN


   PRN Reason: fevers


   Last Admin: 12/14/15 06:53 Dose:  650 mg


Albuterol/Ipratropium (Duoneb 3 Mg/0.5 Mg (3 Ml) Ud)  3 ml INH RQ6 [facility]


   Last Admin: 12/20/15 08:24 Dose:  Not Given


Albuterol/Ipratropium (Duoneb 3 Mg/0.5 Mg (3 Ml) Ud)  3 ml INH RQ6 [facility]


   Last Admin: 12/20/15 08:24 Dose:  3 ml


Aspirin (Ecotrin)  81 mg PO DAILY [facility]


   Last Admin: 12/20/15 08:58 Dose:  81 mg


Enalapril Maleate (Vasotec)  10 mg PO DAILY [facility]


   Last Admin: 12/20/15 09:01 Dose:  10 mg


Famotidine (Pepcid)  20 mg GT BID [facility]


   Last Admin: 12/20/15 09:01 Dose:  20 mg


Diltiazem HCl 125 mg/ Sodium (Chloride)  125 mls @ 10 mls/hr IV .[id number]; 

10 MG/HR


   PRN Reason: Protocol


   Stop: 12/21/15 00:04


   Last Admin: 12/20/15 12:07 Dose:  10 mls/hr


Propofol (Diprivan)  100 mls @ 2.041 mls/hr IV .Q24H [facility]; 5 MCG/KG/MIN


   PRN Reason: Protocol


   Stop: 12/21/15 12:03


   Last Admin: 12/20/15 12:35 Dose:  2.041 mls/hr


Insulin Human Regular (Humulin R)  0 units SC ACHS [facility]


   PRN Reason: Protocol


   Last Admin: 12/20/15 11:23 Dose:  Not Given


Levetiracetam (Keppra)  500 mg PO BID [facility]


   Last Admin: 12/20/15 08:58 Dose:  500 mg


Metoprolol Tartrate (Lopressor)  25 mg PO Q12 [facility]


   Last Admin: 12/20/15 09:00 Dose:  25 mg


Morphine Sulfate (Morphine)  2 mg IVP Q4 PRN


   PRN Reason: Pain, moderate (4-7)


   Last Admin: 12/20/15 11:02 Dose:  2 mg


Multivitamins/Vitamin C (Multi-Delyn Liquid)  5 ml PO DAILY [facility]


   Last Admin: 12/20/15 09:00 Dose:  5 ml


Nitroglycerin (Nitro-Bid 2% Oint)  2 in TOP Q6 [facility]


   Last Admin: 12/20/15 09:00 Dose:  2 in











- Labs


Labs (last 24 hours): 


 Laboratory Results - last 24 hr











  12/19/15 12/19/15 12/20/15





  17:11 21:22 04:30


 


WBC    11.8 H


 


RBC    3.09 L


 


Hgb    10.0 L


 


Hct    31.8 L


 


MCV    102.7 H


 


MCH    32.4 H


 


MCHC    31.6 L


 


RDW    13.2


 


Plt Count    485 H


 


MPV    8.2


 


Neut % (Auto)    79.2 H


 


Lymph % (Auto)    9.7 L


 


Mono % (Auto)    7.1


 


Eos % (Auto)    3.2


 


Baso % (Auto)    0.8


 


Neut #    9.4 H


 


Lymph #    1.1


 


Mono #    0.8


 


Eos #    0.4


 


Baso #    0.1


 


Sodium    138


 


Potassium    3.7


 


Chloride    100


 


Carbon Dioxide    30


 


Anion Gap    12


 


BUN    17


 


Creatinine    0.9


 


Est GFR ( Amer)    > 60


 


Est GFR (Non-Af Amer)    > 60


 


POC Glucose (mg/dL)  72  75 


 


Random Glucose    81


 


Calcium    8.6


 


Total Bilirubin    0.6


 


AST    42


 


ALT    36


 


Alkaline Phosphatase    99


 


Total Protein    6.7


 


Albumin    3.0 L


 


Globulin    3.6


 


Albumin/Globulin Ratio    0.8 L














  12/20/15 12/20/15





  05:43 11:21


 


WBC  


 


RBC  


 


Hgb  


 


Hct  


 


MCV  


 


MCH  


 


MCHC  


 


RDW  


 


Plt Count  


 


MPV  


 


Neut % (Auto)  


 


Lymph % (Auto)  


 


Mono % (Auto)  


 


Eos % (Auto)  


 


Baso % (Auto)  


 


Neut #  


 


Lymph #  


 


Mono #  


 


Eos #  


 


Baso #  


 


Sodium  


 


Potassium  


 


Chloride  


 


Carbon Dioxide  


 


Anion Gap  


 


BUN  


 


Creatinine  


 


Est GFR ( Amer)  


 


Est GFR (Non-Af Amer)  


 


POC Glucose (mg/dL)  78  88


 


Random Glucose  


 


Calcium  


 


Total Bilirubin  


 


AST  


 


ALT  


 


Alkaline Phosphatase  


 


Total Protein  


 


Albumin  


 


Globulin  


 


Albumin/Globulin Ratio  














- Constitutional


Appears: Non-toxic, Cachectic, Chronically Ill





- Head Exam


Head Exam: NORMOCEPHALIC





- Eye Exam


Eye Exam: absent: Scleral icterus





- ENT Exam


ENT Exam: Mucous Membranes Dry, Normal Oropharynx





- Neck Exam


Neck exam: absent: Lymphadenopathy, Thyromegaly





- Respiratory Exam


Respiratory Exam: Decreased Breath Sounds, Rhonchi





- Cardiovascular Exam


Cardiovascular Exam: REGULAR RHYTHM, +S1, +S2





- GI/Abdominal Exam


GI & Abdominal Exam: Normal Bowel Sounds, Soft.  absent: Tenderness





- Rectal Exam


Rectal Exam: Deferred





-  Exam


 Exam: NORMAL INSPECTION





- Extremities Exam


Extremities exam: pedal pulses present.  absent: pedal edema, tenderness





- Back Exam


Back exam: absent: CVA tenderness (L), CVA tenderness (R), paraspinal tenderness





- Neurological Exam


Neurological exam: Alert, CN II-XII Intact





- Psychiatric Exam


Psychiatric exam: Agitated





- Skin


Skin Exam: Dry





Assessment/Plan


(1) Agitation


Current Visit: Yes   Status: Acute


Comment: cont propofol for agitation


unsure if this is lower brainstem activity vs seizure activity vs other etiology


now on ativan/morephin prn, propofol d/c








(2) Aspiration pneumonia


Current Visit: Yes   Status: Acute


Comment: worsened cxr, cont anbx, ID condult, trach placement


tmax lower, on anbx


pending guardianship for trach placement








(3) Cardiac arrest


Current Visit: Yes   Status: Acute


Comment: s/p acls proceudres, ST w/ periods of SB


echo-noted


cardio consult


pt on prn meds for agitation


hr remains elevated unsure if this is related to fever, agitation or other 

etiology-trending down


s/p cooling protocol


no furthe VT


no cardiac intervention planned at present








(4) Diabetes mellitus with hyperglycemia


Current Visit: Yes   Status: Acute


Comment: riss per protocol








(5) History of seizure


Current Visit: Yes   Status: Acute


Comment: neuro consult


?? eeg


started on keppra








(6) NSTEMI (non-ST elevated myocardial infarction)


Current Visit: Yes   Status: Acute

## 2015-12-20 NOTE — CP.CCUPN
CCU Subjective





- Physician Review


Events Since Last Encounter (Free Text): 





12/20/15 08:01


Unresponsive, on vent, weaning has not been successful until now





CCU Objective





- Vital Signs / Intake & Output


Vital Signs (Last 4 hours): 


Vital Signs











  Pulse Resp BP Pulse Ox


 


 12/20/15 06:00  82  12  143/85  100











Intake and Output (Last 8hrs): 


 Intake & Output











 12/19/15 12/20/15 12/20/15





 22:59 06:59 14:59


 


Intake Total 1650 450 


 


Output Total 1000  


 


Balance 650 450 


 


Intake:   


 


  IV 1200 450 


 


  Intake, Piggyback 250  


 


  Free Water Flush 200  


 


Output:   


 


  Urine 1000  


 


    Urethral (Lua) 1000  


 


  Stool 0  














- Physical Exam


Narrative Physical Exam (Free Text): 





12/20/15 08:02


P/E


Neck: No JVD


Lungs: rt basal crackles


Abdomen: soft, BS positive


Ext: No edema


Neuro: unresponsive








- Medications


Active Medications: 


Active Medications











Generic Name Dose Route Start Last Admin





  Trade Name Freq  PRN Reason Stop Dose Admin


 


Acetaminophen  650 mg 12/13/15 13:21 12/18/15 02:28





  Tylenol 650mg/20.3ml Solution Ud  PO   650 mg





  Q6 PRN   Administration





  Fever   


 


Acetaminophen  650 mg 12/13/15 23:57 12/14/15 06:53





  Tylenol 650 Mg Supp  RI   650 mg





  Q6 PRN   Administration





  fevers   


 


Albuterol/Ipratropium  3 ml 12/17/15 20:00 12/20/15 01:02





  Duoneb 3 Mg/0.5 Mg (3 Ml) Ud  INH   3 ml





  RQ6 [facility]   Administration


 


Albuterol/Ipratropium  3 ml 12/18/15 08:00 12/19/15 19:42





  Duoneb 3 Mg/0.5 Mg (3 Ml) Ud  INH   Not Given





  RQ6 [facility]   


 


Aspirin  81 mg 12/13/15 13:30 12/19/15 08:28





  Ecotrin  PO   81 mg





  DAILY [facility]   Administration


 


Enalapril Maleate  10 mg 12/10/15 11:15 12/19/15 08:30





  Vasotec  PO   10 mg





  DAILY [facility]   Administration


 


Famotidine  20 mg 12/10/15 17:00 12/19/15 17:00





  Pepcid  GT   20 mg





  BID [facility]   Administration


 


Insulin Human Regular  0 units 12/10/15 22:00 12/20/15 06:53





  Humulin R  SC   Not Given





  [facility]   





  Protocol   


 


Levetiracetam  500 mg 12/18/15 09:00 12/19/15 17:00





  Keppra  PO   500 mg





  BID [facility]   Administration


 


Lorazepam  2 mg 12/17/15 08:26 12/20/15 03:30





  Ativan  IVP   2 mg





  Q4 PRN   Administration





  Agitation   


 


Metoprolol Tartrate  25 mg 12/20/15 09:00  





  Lopressor  PO   





  Q12 [facility]   


 


Morphine Sulfate  2 mg 12/17/15 20:23 12/20/15 06:40





  Morphine  IVP   2 mg





  Q4 PRN   Administration





  Pain, moderate (4-7)   


 


Multivitamins/Vitamin C  5 ml 12/10/15 09:00 12/19/15 08:29





  Multi-Delyn Liquid  PO   5 ml





  DAILY [facility]   Administration


 


Nitroglycerin  2 in 12/10/15 16:00 12/20/15 06:56





  Nitro-Bid 2% Oint  TOP   2 in





  Q6 [facility]   Administration














- Patient Studies


Lab Studies: 


 Lab Studies











  12/20/15 12/20/15 12/19/15 Range/Units





  05:43 04:30 21:22 


 


WBC   11.8 H   (4.8-10.8)  K/uL


 


RBC   3.09 L   (4.40-5.90)  Mil/uL


 


Hgb   10.0 L   (12.0-18.0)  g/dL


 


Hct   31.8 L   (35.0-51.0)  %


 


MCV   102.7 H   (80.0-94.0)  fl


 


MCH   32.4 H   (27.0-31.0)  pg


 


MCHC   31.6 L   (33.0-37.0)  g/dL


 


RDW   13.2   (11.5-14.5)  %


 


Plt Count   485 H   (130-400)  K/uL


 


MPV   8.2   (7.2-11.7)  fl


 


Neut % (Auto)   79.2 H   (50.0-75.0)  %


 


Lymph % (Auto)   9.7 L   (20.0-40.0)  %


 


Mono % (Auto)   7.1   (0.0-10.0)  %


 


Eos % (Auto)   3.2   (0.0-4.0)  %


 


Baso % (Auto)   0.8   (0.0-2.0)  %


 


Neut #   9.4 H   (1.8-7.0)  K/uL


 


Lymph #   1.1   (1.0-4.3)  K/uL


 


Mono #   0.8   (0.0-0.8)  K/uL


 


Eos #   0.4   (0.0-0.7)  K/uL


 


Baso #   0.1   (0.0-0.2)  K/uL


 


Sodium   138   (132-148)  mmol/l


 


Potassium   3.7   (3.6-5.0)  MMOL/L


 


Chloride   100   ()  mmol/L


 


Carbon Dioxide   30   (22-30)  mmol/L


 


Anion Gap   12   (10-20)  


 


BUN   17   (9-20)  mg/dl


 


Creatinine   0.9   (0.8-1.5)  mg/dL


 


Est GFR ([place] Amer)   > 60   


 


Est GFR (Non-Af Amer)   > 60   


 


POC Glucose (mg/dL)  78   75  ()  mg/dL


 


Random Glucose   81   ()  mg/dL


 


Calcium   8.6   (8.4-10.2)  mg/dL


 


Total Bilirubin   0.6   (0.2-1.3)  mg/dl


 


AST   42   (17-59)  U/L


 


ALT   36   (21-72)  U/L


 


Alkaline Phosphatase   99   ()  U/L


 


Total Protein   6.7   (6.3-8.2)  G/DL


 


Albumin   3.0 L   (3.5-5.0)  g/dL


 


Globulin   3.6   (2.2-3.9)  gm/dL


 


Albumin/Globulin Ratio   0.8 L   (1.0-2.1)  














  12/19/15 Range/Units





  17:11 


 


WBC   (4.8-10.8)  K/uL


 


RBC   (4.40-5.90)  Mil/uL


 


Hgb   (12.0-18.0)  g/dL


 


Hct   (35.0-51.0)  %


 


MCV   (80.0-94.0)  fl


 


MCH   (27.0-31.0)  pg


 


MCHC   (33.0-37.0)  g/dL


 


RDW   (11.5-14.5)  %


 


Plt Count   (130-400)  K/uL


 


MPV   (7.2-11.7)  fl


 


Neut % (Auto)   (50.0-75.0)  %


 


Lymph % (Auto)   (20.0-40.0)  %


 


Mono % (Auto)   (0.0-10.0)  %


 


Eos % (Auto)   (0.0-4.0)  %


 


Baso % (Auto)   (0.0-2.0)  %


 


Neut #   (1.8-7.0)  K/uL


 


Lymph #   (1.0-4.3)  K/uL


 


Mono #   (0.0-0.8)  K/uL


 


Eos #   (0.0-0.7)  K/uL


 


Baso #   (0.0-0.2)  K/uL


 


Sodium   (132-148)  mmol/l


 


Potassium   (3.6-5.0)  MMOL/L


 


Chloride   ()  mmol/L


 


Carbon Dioxide   (22-30)  mmol/L


 


Anion Gap   (10-20)  


 


BUN   (9-20)  mg/dl


 


Creatinine   (0.8-1.5)  mg/dL


 


Est GFR ([place] Amer)   


 


Est GFR (Non-Af Amer)   


 


POC Glucose (mg/dL)  72  ()  mg/dL


 


Random Glucose   ()  mg/dL


 


Calcium   (8.4-10.2)  mg/dL


 


Total Bilirubin   (0.2-1.3)  mg/dl


 


AST   (17-59)  U/L


 


ALT   (21-72)  U/L


 


Alkaline Phosphatase   ()  U/L


 


Total Protein   (6.3-8.2)  G/DL


 


Albumin   (3.5-5.0)  g/dL


 


Globulin   (2.2-3.9)  gm/dL


 


Albumin/Globulin Ratio   (1.0-2.1)  








 Laboratory Results - last 24 hr











  12/19/15 12/19/15 12/20/15





  17:11 21:22 04:30


 


WBC    11.8 H


 


RBC    3.09 L


 


Hgb    10.0 L


 


Hct    31.8 L


 


MCV    102.7 H


 


MCH    32.4 H


 


MCHC    31.6 L


 


RDW    13.2


 


Plt Count    485 H


 


MPV    8.2


 


Neut % (Auto)    79.2 H


 


Lymph % (Auto)    9.7 L


 


Mono % (Auto)    7.1


 


Eos % (Auto)    3.2


 


Baso % (Auto)    0.8


 


Neut #    9.4 H


 


Lymph #    1.1


 


Mono #    0.8


 


Eos #    0.4


 


Baso #    0.1


 


Sodium    138


 


Potassium    3.7


 


Chloride    100


 


Carbon Dioxide    30


 


Anion Gap    12


 


BUN    17


 


Creatinine    0.9


 


Est GFR ( Amer)    > 60


 


Est GFR (Non-Af Amer)    > 60


 


POC Glucose (mg/dL)  72  75 


 


Random Glucose    81


 


Calcium    8.6


 


Total Bilirubin    0.6


 


AST    42


 


ALT    36


 


Alkaline Phosphatase    99


 


Total Protein    6.7


 


Albumin    3.0 L


 


Globulin    3.6


 


Albumin/Globulin Ratio    0.8 L














  12/20/15





  05:43


 


WBC 


 


RBC 


 


Hgb 


 


Hct 


 


MCV 


 


MCH 


 


MCHC 


 


RDW 


 


Plt Count 


 


MPV 


 


Neut % (Auto) 


 


Lymph % (Auto) 


 


Mono % (Auto) 


 


Eos % (Auto) 


 


Baso % (Auto) 


 


Neut # 


 


Lymph # 


 


Mono # 


 


Eos # 


 


Baso # 


 


Sodium 


 


Potassium 


 


Chloride 


 


Carbon Dioxide 


 


Anion Gap 


 


BUN 


 


Creatinine 


 


Est GFR ( Amer) 


 


Est GFR (Non-Af Amer) 


 


POC Glucose (mg/dL)  78


 


Random Glucose 


 


Calcium 


 


Total Bilirubin 


 


AST 


 


ALT 


 


Alkaline Phosphatase 


 


Total Protein 


 


Albumin 


 


Globulin 


 


Albumin/Globulin Ratio 











Fingerstick Blood Sugar Results: 78





Critical Care Progress Note





- Ventilator Checklist


Head of Bed 30 Degrees: Yes


Daily Sedation Vacation: Yes


Daily Assessment of Readiness to Learn: Yes


PUD Prophalyxis: Yes


DVT Prophylaxis: Yes





- Vent Settings


MODE:: ASSIST CONTROL


TIDAL VOLUME:: 500


RESP RATE:: 12


FIO2:: 60


PEEP:: 5





- Extremities/Vascular


Does the Patient have a Central Venous Catheter?: No





Assessment/Plan





- Assessment and Plan (Free Text)


Assessment: 








Assessment/Plan


(1) Cardiac arrest


Current Visit: Yes   Status: Acute


Comment: s/p acls proceudres, ST w/ periods of SB


no furthe VT


no cardiac intervention planned at present








(2) DVT prophylaxis


Current Visit: Yes   Status: Acute


Comment: lovenox








(3) Scrotal edema


Current Visit: Yes   Status: Acute


Comment: scrotal us-noted


urology consult-dr cortes to see pt when stable





(4) History of seizure


Current Visit: Yes   Status: Acute


Comment: neuro consult


started on keppra





(5) Diabetes mellitus with hyperglycemia


Current Visit: Yes   Status: Acute


Comment: riss per protocol





(6) Aspiration pneumonia


Current Visit: Yes   Status: Acute


Comment: 


CXR from this am pending, will review, continue current meds





(7)Resp Failure


ABG reviewed, CXR will be reviewed, will try PSV

## 2015-12-20 NOTE — CP.PCM.PN
Subjective





- Subjective


Subjective: 


ICU addendum to Progress notes


Pt was evaluated multiple time


Attempts to wean him off were not successful, pt developed rapid onset afib, 

cardizem 10mg, 5 mg and then 5 mg bolus give and then cardizem infusion was 

startd at 10 mg /h, pt has been in and out of ICU since then and has been in SR 

now, BP has been stable.


Vent setting back to ACCXR , ABG reviewed. 








Objective





- Vital Signs/Intake and Output


Vital Signs (last 24 hours): 


 Vital Signs - 24 hr











  12/19/15 12/19/15 12/19/15





  18:00 20:00 22:00


 


Temperature  99.2 F 


 


Pulse Rate 95 H 82 107 H


 


Respiratory  16 17





Rate   


 


Blood Pressure 163/97 H 161/103 H 170/100 H


 


O2 Sat by Pulse 98 100 100





Oximetry   














  12/20/15 12/20/15 12/20/15





  02:00 04:00 06:00


 


Temperature  98.7 F 


 


Pulse Rate 99 H 82 82


 


Respiratory 18 13 12





Rate   


 


Blood Pressure 171/107 H 157/91 H 143/85


 


O2 Sat by Pulse 100 99 100





Oximetry   














  12/20/15 12/20/15 12/20/15





  08:00 09:00 09:52


 


Temperature 98.5 F  


 


Pulse Rate 86 96 H 108 H


 


Respiratory 18  22





Rate   


 


Blood Pressure 152/91 H 146/78 157/76 H


 


O2 Sat by Pulse 100  100





Oximetry   














  12/20/15 12/20/15 12/20/15





  11:35 11:59 12:00


 


Temperature   98.8 F


 


Pulse Rate 165 H 160 H 148 H


 


Respiratory   17





Rate   


 


Blood Pressure 140/72 140/72 140/72


 


O2 Sat by Pulse   100





Oximetry   














  12/20/15 12/20/15 12/20/15





  14:00 15:12 16:12


 


Temperature   99.1 F


 


Pulse Rate 77 133 H 136 H


 


Respiratory 18 16 14





Rate   


 


Blood Pressure 160/75 H 113/76 150/73


 


O2 Sat by Pulse 99 100 100





Oximetry   














  12/20/15





  17:00


 


Temperature 


 


Pulse Rate 99 H


 


Respiratory 19





Rate 


 


Blood Pressure 143/82


 


O2 Sat by Pulse 100





Oximetry 











Intake and Output (last 12 hours): 


 Intake & Output











 12/19/15 12/20/15 12/20/15





 18:59 06:59 18:59


 


Intake Total 7367 289 9662


 


Output Total 1000  900


 


Balance 350 750 295


 


Intake:   


 


   750 475


 


  Intake, Piggyback 250  


 


  Oral   100


 


  Tube Feeding   420


 


  Free Water Flush 200  200


 


Output:   


 


  Urine 1000  900


 


    Urethral (Lua) 1000  900


 


  Stool 0  














- Medications


Medications: 


 Current Medications





Acetaminophen (Tylenol 650mg/20.3ml Solution Ud)  650 mg PO Q6 PRN


   PRN Reason: Fever


   Last Admin: 12/18/15 02:28 Dose:  650 mg


Acetaminophen (Tylenol 650 Mg Supp)  650 mg TN Q6 PRN


   PRN Reason: fevers


   Last Admin: 12/14/15 06:53 Dose:  650 mg


Albuterol/Ipratropium (Duoneb 3 Mg/0.5 Mg (3 Ml) Ud)  3 ml INH RQ6 [facility]


   Last Admin: 12/20/15 14:19 Dose:  3 ml


Aspirin (Ecotrin)  81 mg PO DAILY Counts include 234 beds at the [facility]


   Last Admin: 12/20/15 08:58 Dose:  81 mg


Enalapril Maleate (Vasotec)  10 mg PO DAILY Counts include 234 beds at the [facility]


   Last Admin: 12/20/15 09:01 Dose:  10 mg


Famotidine (Pepcid)  20 mg GT BID Counts include 234 beds at the [facility]


   Last Admin: 12/20/15 16:44 Dose:  20 mg


Diltiazem HCl 125 mg/ Sodium (Chloride)  125 mls @ 10 mls/hr IV .[id number]; 

10 MG/HR


   PRN Reason: Protocol


   Stop: 12/21/15 00:04


   Last Admin: 12/20/15 12:07 Dose:  10 mls/hr


Propofol (Diprivan)  100 mls @ 2.041 mls/hr IV .Q24H [facility]; 5 MCG/KG/MIN


   PRN Reason: Protocol


   Stop: 12/21/15 12:03


   Last Admin: 12/20/15 12:35 Dose:  2.041 mls/hr


Ciprofloxacin (Cipro 400mg/200ml Dsw)  200 mls @ 200 mls/hr IVPB Q12 Counts include 234 beds at the [facility]


Insulin Human Regular (Humulin R)  0 units SC ACHS [facility]


   PRN Reason: Protocol


   Last Admin: 12/20/15 16:43 Dose:  Not Given


Levetiracetam (Keppra)  500 mg PO BID Counts include 234 beds at the [facility]


   Last Admin: 12/20/15 16:44 Dose:  500 mg


Metoprolol Tartrate (Lopressor)  25 mg PO Q12 [facility]


   Last Admin: 12/20/15 09:00 Dose:  25 mg


Morphine Sulfate (Morphine)  2 mg IVP Q4 PRN


   PRN Reason: Pain, moderate (4-7)


   Last Admin: 12/20/15 11:02 Dose:  2 mg


Multivitamins/Vitamin C (Multi-Delyn Liquid)  5 ml PO DAILY Counts include 234 beds at the [facility]


   Last Admin: 12/20/15 09:00 Dose:  5 ml


Nitroglycerin (Nitro-Bid 2% Oint)  2 in TOP Q6 Counts include 234 beds at the [facility]


   Last Admin: 12/20/15 16:44 Dose:  2 in











- Labs


Labs (last 24 hours): 


 Laboratory Results - last 24 hr











  12/19/15 12/20/15 12/20/15





  21:22 04:30 05:43


 


WBC   11.8 H 


 


RBC   3.09 L 


 


Hgb   10.0 L 


 


Hct   31.8 L 


 


MCV   102.7 H 


 


MCH   32.4 H 


 


MCHC   31.6 L 


 


RDW   13.2 


 


Plt Count   485 H 


 


MPV   8.2 


 


Neut % (Auto)   79.2 H 


 


Lymph % (Auto)   9.7 L 


 


Mono % (Auto)   7.1 


 


Eos % (Auto)   3.2 


 


Baso % (Auto)   0.8 


 


Neut #   9.4 H 


 


Lymph #   1.1 


 


Mono #   0.8 


 


Eos #   0.4 


 


Baso #   0.1 


 


Sodium   138 


 


Potassium   3.7 


 


Chloride   100 


 


Carbon Dioxide   30 


 


Anion Gap   12 


 


BUN   17 


 


Creatinine   0.9 


 


Est GFR ( Amer)   > 60 


 


Est GFR (Non-Af Amer)   > 60 


 


POC Glucose (mg/dL)  75   78


 


Random Glucose   81 


 


Calcium   8.6 


 


Total Bilirubin   0.6 


 


AST   42 


 


ALT   36 


 


Alkaline Phosphatase   99 


 


Total Protein   6.7 


 


Albumin   3.0 L 


 


Globulin   3.6 


 


Albumin/Globulin Ratio   0.8 L 














  12/20/15 12/20/15





  11:21 16:41


 


WBC  


 


RBC  


 


Hgb  


 


Hct  


 


MCV  


 


MCH  


 


MCHC  


 


RDW  


 


Plt Count  


 


MPV  


 


Neut % (Auto)  


 


Lymph % (Auto)  


 


Mono % (Auto)  


 


Eos % (Auto)  


 


Baso % (Auto)  


 


Neut #  


 


Lymph #  


 


Mono #  


 


Eos #  


 


Baso #  


 


Sodium  


 


Potassium  


 


Chloride  


 


Carbon Dioxide  


 


Anion Gap  


 


BUN  


 


Creatinine  


 


Est GFR ( Amer)  


 


Est GFR (Non-Af Amer)  


 


POC Glucose (mg/dL)  88  101


 


Random Glucose  


 


Calcium  


 


Total Bilirubin  


 


AST  


 


ALT  


 


Alkaline Phosphatase  


 


Total Protein  


 


Albumin  


 


Globulin  


 


Albumin/Globulin Ratio

## 2015-12-21 LAB
ALBUMIN SERPL-MCNC: 3 G/DL (ref 3.5–5)
ALBUMIN/GLOB SERPL: 0.9 {RATIO} (ref 1–2.1)
ALT SERPL-CCNC: 35 U/L (ref 21–72)
ARTERIAL BLOOD GAS HEMOGLOBIN: 10.6 G/DL (ref 11.7–17.4)
ARTERIAL BLOOD GAS O2 SAT: 97.8 % (ref 95–98)
ARTERIAL BLOOD GAS PCO2: 42 MM/HG (ref 35–45)
ARTERIAL BLOOD GAS TCO2: 34.1 MMOL/L (ref 22–28)
ARTERIAL PATENCY WRIST A: YES
AST SERPL-CCNC: 42 U/L (ref 17–59)
BASOPHILS # BLD AUTO: 0.1 K/UL (ref 0–0.2)
BASOPHILS NFR BLD: 0.7 % (ref 0–2)
BUN SERPL-MCNC: 15 MG/DL (ref 9–20)
BUN SERPL-MCNC: 17 MG/DL (ref 9–20)
CALCIUM SERPL-MCNC: 8.5 MG/DL (ref 8.4–10.2)
CALCIUM SERPL-MCNC: 8.8 MG/DL (ref 8.4–10.2)
EOSINOPHIL # BLD AUTO: 0.3 K/UL (ref 0–0.7)
EOSINOPHIL NFR BLD: 2.6 % (ref 0–4)
ERYTHROCYTE [DISTWIDTH] IN BLOOD BY AUTOMATED COUNT: 12.8 % (ref 11.5–14.5)
ERYTHROCYTE [DISTWIDTH] IN BLOOD BY AUTOMATED COUNT: 13.2 % (ref 11.5–14.5)
GFR NON-AFRICAN AMERICAN: > 60
GFR NON-AFRICAN AMERICAN: > 60
HCO3 BLDA-SCNC: 31.8 MMOL/L (ref 21–28)
HGB BLD-MCNC: 9.4 G/DL (ref 12–18)
HGB BLD-MCNC: 9.8 G/DL (ref 12–18)
INHALED O2 CONCENTRATION: 50 %
LYMPHOCYTES # BLD AUTO: 1.4 K/UL (ref 1–4.3)
LYMPHOCYTES NFR BLD AUTO: 11.2 % (ref 20–40)
MCH RBC QN AUTO: 32.4 PG (ref 27–31)
MCH RBC QN AUTO: 32.5 PG (ref 27–31)
MCHC RBC AUTO-ENTMCNC: 31.6 G/DL (ref 33–37)
MCHC RBC AUTO-ENTMCNC: 31.8 G/DL (ref 33–37)
MCV RBC AUTO: 102.3 FL (ref 80–94)
MCV RBC AUTO: 102.3 FL (ref 80–94)
MONOCYTES # BLD: 1 K/UL (ref 0–0.8)
MONOCYTES NFR BLD: 7.7 % (ref 0–10)
NEUTROPHILS # BLD: 10 K/UL (ref 1.8–7)
NEUTROPHILS NFR BLD AUTO: 77.8 % (ref 50–75)
NRBC BLD AUTO-RTO: 0 % (ref 0–0)
O2 CAP BLDA-SCNC: 14.6 ML/DL (ref 16–24)
O2 CT BLDA-SCNC: 14.3 ML/DL (ref 15–23)
PH BLDA: 7.5 [PH] (ref 7.35–7.45)
PLATELET # BLD: 500 K/UL (ref 130–400)
PLATELET # BLD: 541 K/UL (ref 130–400)
PMV BLD AUTO: 7.6 FL (ref 7.2–11.7)
PO2 BLDA: 85 MM/HG (ref 80–100)
RBC # BLD AUTO: 2.89 MIL/UL (ref 4.4–5.9)
RBC # BLD AUTO: 3.01 MIL/UL (ref 4.4–5.9)
WBC # BLD AUTO: 12.9 K/UL (ref 4.8–10.8)
WBC # BLD AUTO: 13.2 K/UL (ref 4.8–10.8)

## 2015-12-21 RX ADMIN — Medication SCH ML: at 10:02

## 2015-12-21 RX ADMIN — POTASSIUM CHLORIDE SCH MLS/HR: 10 INJECTION, SOLUTION INTRAVENOUS at 14:13

## 2015-12-21 RX ADMIN — IPRATROPIUM BROMIDE AND ALBUTEROL SULFATE SCH ML: .5; 3 SOLUTION RESPIRATORY (INHALATION) at 08:09

## 2015-12-21 RX ADMIN — NITROGLYCERIN SCH: 20 OINTMENT TOPICAL at 22:17

## 2015-12-21 RX ADMIN — NITROGLYCERIN SCH IN: 20 OINTMENT TOPICAL at 04:00

## 2015-12-21 RX ADMIN — LEVETIRACETAM SCH MG: 100 SOLUTION ORAL at 17:37

## 2015-12-21 RX ADMIN — LEVETIRACETAM SCH MG: 100 SOLUTION ORAL at 10:00

## 2015-12-21 RX ADMIN — NITROGLYCERIN SCH IN: 20 OINTMENT TOPICAL at 17:38

## 2015-12-21 RX ADMIN — IPRATROPIUM BROMIDE AND ALBUTEROL SULFATE SCH ML: .5; 3 SOLUTION RESPIRATORY (INHALATION) at 20:00

## 2015-12-21 RX ADMIN — IPRATROPIUM BROMIDE AND ALBUTEROL SULFATE SCH ML: .5; 3 SOLUTION RESPIRATORY (INHALATION) at 14:11

## 2015-12-21 RX ADMIN — CIPROFLOXACIN SCH MLS/HR: 2 INJECTION, SOLUTION INTRAVENOUS at 10:04

## 2015-12-21 RX ADMIN — IPRATROPIUM BROMIDE AND ALBUTEROL SULFATE SCH ML: .5; 3 SOLUTION RESPIRATORY (INHALATION) at 01:30

## 2015-12-21 RX ADMIN — NITROGLYCERIN SCH IN: 20 OINTMENT TOPICAL at 10:03

## 2015-12-21 RX ADMIN — POTASSIUM CHLORIDE SCH MLS/HR: 10 INJECTION, SOLUTION INTRAVENOUS at 17:39

## 2015-12-21 NOTE — RAD
HISTORY:

Pt. intubated and with OGT in place.  



COMPARISON:

Prior radiograph from 12/20/2015. 



FINDINGS:



LUNGS:

Multifocal opacities throughout both lungs essentially unchanged.



PLEURA:

Layering effusion on the left.



CARDIOVASCULAR:

Normal.



OSSEOUS STRUCTURES:

Extensive degenerative changes of both shoulders left greater than 

right.  Degenerative changes involving the acromioclavicular joint 

again



VISUALIZED UPPER ABDOMEN:

Normal.



OTHER FINDINGS:

ET tube with the distal tip at the level of the clavicles. Feeding 

tube with the distal tip below the diaphragm.



Catheter is seen coursing underneath the clavicle on the right with 

the distal tip overlying the projection of the SVC. 



IMPRESSION:

Persistent multifocal opacities throughout both lungs. Layering 

left-sided effusion.

## 2015-12-21 NOTE — CP.PCM.PN
Subjective





- Subjective


Subjective: 


pt comfortable on vent, sedated. pt still bucking vent and biting tube. no f/c, 

n/v/d. cxr w/ worsening congestion/effusions today


pt was ordered DNR 12/19/15 but unsure the course of this order as there is no 

one to provide consent for this. 





Review of Systems





- Review of Systems


Systems not reviewed;Unavailable: Intubated





- Constitutional


Constitutional: UN





- EENT


Eyes: UNREMARKABLE


Ears: UNREMARKABLE


Nose/Mouth/Throat: UNREMARKABLE





- Cardiovascular


Cardiovascular: UNREMARKABLE





- Respiratory


Respiratory: As Per HPI, Chest Congestion, Excessive Mucous Production





- Gastrointestinal


Gastrointestinal: UNREMARKABLE





- Genitourinary


Genitourinary: UNREMARKABLE





- Reproductive: Male


Reproductive:Male: UNREMARKABLE





- Musculoskeletal


Musculoskeletal: UNREMARKABLE





- Integumentary


Integumentary: UNREMARKABLE





- Neurological


Neurological: UNREMARKABLE





- Psychiatric


Psychiatric: UNREMARKABLE





- Endocrine


Endocrine: UNREMARKABLE





- Hematologic/Lymphatic


Hematologic: UNREMARKABLE





Objective





- Vital Signs/Intake and Output


Vital Signs (last 24 hours): 


 Vital Signs - 24 hr











  12/20/15 12/20/15 12/20/15





  09:00 09:52 11:35


 


Temperature   


 


Pulse Rate 96 H 108 H 165 H


 


Respiratory  22 





Rate   


 


Blood Pressure 146/78 157/76 H 140/72


 


O2 Sat by Pulse  100 





Oximetry   














  12/20/15 12/20/15 12/20/15





  11:59 12:00 14:00


 


Temperature  98.8 F 


 


Pulse Rate 160 H 148 H 77


 


Respiratory  17 18





Rate   


 


Blood Pressure 140/72 140/72 160/75 H


 


O2 Sat by Pulse  100 99





Oximetry   














  12/20/15 12/20/15 12/20/15





  15:12 16:12 17:00


 


Temperature  99.1 F 


 


Pulse Rate 133 H 136 H 99 H


 


Respiratory 16 14 19





Rate   


 


Blood Pressure 113/76 150/73 143/82


 


O2 Sat by Pulse 100 100 100





Oximetry   














  12/20/15 12/20/15 12/20/15





  18:00 20:00 20:51


 


Temperature  99.9 F H 


 


Pulse Rate 102 H 110 H 94 H


 


Respiratory 20 21 





Rate   


 


Blood Pressure 141/87 162/73 H 147/81


 


O2 Sat by Pulse 99 100 





Oximetry   














  12/20/15 12/20/15 12/20/15





  21:00 22:00 23:00


 


Temperature   


 


Pulse Rate 97 H 93 H 96 H


 


Respiratory 22 18 28 H





Rate   


 


Blood Pressure 143/86 134/78 143/90


 


O2 Sat by Pulse 100 100 100





Oximetry   














  12/21/15 12/21/15 12/21/15





  00:00 01:00 02:00


 


Temperature 98.9 F  


 


Pulse Rate 95 H 92 H 90


 


Respiratory 20 16 15





Rate   


 


Blood Pressure 136/82 118/67 129/79


 


O2 Sat by Pulse 100 98 100





Oximetry   














  12/21/15 12/21/15 12/21/15





  03:00 04:00 05:00


 


Temperature  99.7 F H 


 


Pulse Rate 88 89 86


 


Respiratory 18 16 15





Rate   


 


Blood Pressure 129/75 125/76 125/78


 


O2 Sat by Pulse 100 100 100





Oximetry   














  12/21/15 12/21/15





  06:00 07:00


 


Temperature  


 


Pulse Rate 84 84


 


Respiratory 14 15





Rate  


 


Blood Pressure 128/73 113/67


 


O2 Sat by Pulse 100 100





Oximetry  











Intake and Output (last 12 hours): 


 Intake & Output











 12/20/15 12/21/15 12/21/15





 18:59 06:59 18:59


 


Intake Total 1195 1608 


 


Output Total 900 840 


 


Balance 295 768 


 


Intake:   


 


   218 


 


  Intake, Piggyback  220 


 


  Oral 100  


 


  Tube Feeding 420 720 


 


  Free Water Flush 200 450 


 


Output:   


 


  Gastric Amount  140 


 


    Stomach  140 


 


  Urine 900 700 


 


    Urethral (Mcgee) 900 700 














- Medications


Medications: 


 Current Medications





Acetaminophen (Tylenol 650mg/20.3ml Solution Ud)  650 mg PO Q6 PRN


   PRN Reason: Fever


   Last Admin: 12/18/15 02:28 Dose:  650 mg


Acetaminophen (Tylenol 650 Mg Supp)  650 mg VT Q6 PRN


   PRN Reason: fevers


   Last Admin: 12/14/15 06:53 Dose:  650 mg


Albuterol/Ipratropium (Duoneb 3 Mg/0.5 Mg (3 Ml) Ud)  3 ml INH RQ6 [facility]


   Last Admin: 12/21/15 08:09 Dose:  3 ml


Aspirin (Ecotrin)  81 mg PO DAILY [facility]


   Last Admin: 12/20/15 08:58 Dose:  81 mg


Enalapril Maleate (Vasotec)  10 mg PO DAILY [facility]


   Last Admin: 12/20/15 09:01 Dose:  10 mg


Famotidine (Pepcid)  20 mg GT BID [facility]


   Last Admin: 12/20/15 16:44 Dose:  20 mg


Propofol (Diprivan)  100 mls @ 2.041 mls/hr IV .Q24H [facility]; 5 MCG/KG/MIN


   PRN Reason: Protocol


   Stop: 12/21/15 12:03


   Last Admin: 12/20/15 12:35 Dose:  2.041 mls/hr


Ciprofloxacin (Cipro 400mg/200ml Dsw)  200 mls @ 200 mls/hr IVPB Q12 [facility]


   Last Admin: 12/20/15 20:48 Dose:  200 mls/hr


Insulin Human Regular (Humulin R)  0 units SC ACHS [facility]


   PRN Reason: Protocol


   Last Admin: 12/21/15 07:10 Dose:  Not Given


Levetiracetam (Keppra)  500 mg PO BID [facility]


   Last Admin: 12/20/15 16:44 Dose:  500 mg


Metoprolol Tartrate (Lopressor)  25 mg PO Q12 [facility]


   Last Admin: 12/20/15 20:51 Dose:  25 mg


Multivitamins/Vitamin C (Multi-Delyn Liquid)  5 ml PO DAILY [facility]


   Last Admin: 12/20/15 09:00 Dose:  5 ml


Nitroglycerin (Nitro-Bid 2% Oint)  2 in TOP Q6 [facility]


   Last Admin: 12/21/15 04:00 Dose:  2 in











- Labs


Labs (last 24 hours): 


 Laboratory Results - last 24 hr











  12/20/15 12/20/15 12/20/15





  11:21 16:41 22:58


 


WBC   


 


RBC   


 


Hgb   


 


Hct   


 


MCV   


 


MCH   


 


MCHC   


 


RDW   


 


Plt Count   


 


pCO2   


 


pO2   


 


HCO3   


 


ABG pH   


 


ABG Total CO2   


 


ABG O2 Saturation   


 


ABG O2 Content   


 


ABG Base Excess   


 


ABG Hemoglobin   


 


ABG Carboxyhemoglobin   


 


POC ABG HHb (Measured)   


 


ABG Methemoglobin   


 


ABG O2 Capacity   


 


Chu Test   


 


A-a O2 Difference   


 


Hgb O2 Saturation   


 


FiO2   


 


Sodium   


 


Potassium   


 


Chloride   


 


Carbon Dioxide   


 


Anion Gap   


 


BUN   


 


Creatinine   


 


Est GFR ( Amer)   


 


Est GFR (Non-Af Amer)   


 


POC Glucose (mg/dL)  88  101  92


 


Random Glucose   


 


Calcium   














  12/21/15 12/21/15 12/21/15





  04:55 05:00 06:03


 


WBC   13.2 H 


 


RBC   3.01 L 


 


Hgb   9.8 L 


 


Hct   30.8 L 


 


MCV   102.3 H 


 


MCH   32.5 H 


 


MCHC   31.8 L 


 


RDW   13.2 


 


Plt Count   541 H 


 


pCO2  42  


 


pO2  85  


 


HCO3  31.8 H  


 


ABG pH  7.50 H  


 


ABG Total CO2  34.1 H  


 


ABG O2 Saturation  97.8  


 


ABG O2 Content  14.3 L  


 


ABG Base Excess  8.8 H  


 


ABG Hemoglobin  10.6 L  


 


ABG Carboxyhemoglobin  1.6 H  


 


POC ABG HHb (Measured)  2.1  


 


ABG Methemoglobin  0.7  


 


ABG O2 Capacity  14.6 L  


 


Chu Test  Yes  


 


A-a O2 Difference  219.0  


 


Hgb O2 Saturation  95.5  


 


FiO2  50.0  


 


Sodium   139 


 


Potassium   3.4 L 


 


Chloride   99 


 


Carbon Dioxide   31 H 


 


Anion Gap   12 


 


BUN   17 


 


Creatinine   0.9 


 


Est GFR ( Amer)   > 60 


 


Est GFR (Non-Af Amer)   > 60 


 


POC Glucose (mg/dL)    116 H


 


Random Glucose   111 H 


 


Calcium   8.8 














- Constitutional


Appears: Non-toxic, No Acute Distress, Chronically Ill





- Head Exam


Head Exam: ATRAUMATIC, NORMAL INSPECTION, NORMOCEPHALIC





- Eye Exam


Eye Exam: EOMI





- Respiratory Exam


Respiratory Exam: Clear to PA & Lateral, Rhonchi


Additional comments: 


diffuse rhonchi, vented/sedated





- Cardiovascular Exam


Cardiovascular Exam: REGULAR RHYTHM, RRR





- GI/Abdominal Exam


GI & Abdominal Exam: Normal Bowel Sounds, Soft, Unremarkable





- Extremities Exam


Extremities exam: full ROM, normal capillary refill, normal inspection, pedal 

pulses present





- Neurological Exam


Additional comments: 


sedated





- Skin


Skin Exam: Dry, Intact, Normal Color, Warm





Assessment/Plan


(1) Cardiac arrest


Current Visit: Yes   Status: Acute


Comment: hr noted in nursing notes to be fluctuating-more stable today


cardizem restarted for tachycardia. 


cardio on board and pt is w/o further episodes of VT


no cardio interventions planned at present.








(2) DVT prophylaxis


Current Visit: Yes   Status: Acute


Comment: lovenox








(3) Scrotal edema


Current Visit: Yes   Status: Acute


Comment: scrotal us-noted


urology consult-dr cortes to see pt when stable








(4) History of seizure


Current Visit: Yes   Status: Acute


Comment: neuro consult


?? eeg


started on keppra








(5) Diabetes mellitus with hyperglycemia


Current Visit: Yes   Status: Acute


Comment: riss per protocol








(6) Agitation


Current Visit: Yes   Status: Acute


Comment: pt restarted on propofol for agitation today








(7) UTI (urinary tract infection)


Current Visit: Yes   Status: Acute


Comment: cont anbx


mcgee cath


uro consult pending








(8) Aspiration pneumonia


Current Visit: Yes   Status: Acute


Comment: slightly improved cxr, restrated on ly on cipro


monitor cxr


cont to follow ID recommednations











- Assessment and Plan (Free Text)


Assessment: 


?? need to repeat all c/s

## 2015-12-21 NOTE — CP.CCUPN
CCU Subjective





- Physician Review


Events Since Last Encounter (Free Text): 








   Patient seen and examined, Events reviewed


   Mr. Carpenter is 55 year old male brought to ED by EMS on 12/ 08 for witnessed 

cardiac arrest. Patient reportedly went into cardiac arrest 15 minute prior to 

ALS arrival, initial found to be in pulseless V-tach. Epi administered with a 

shock in field, complete return of pulses at that time. Patient arrested a 

second time, another epi administered with shocks. Pulses returned, patient was 

intubated and transported to ED. Patient upon arrival is intubated in sinus 

tachycardia with a blood pressure of 119/70.


   Post-Resuscitation Therapeutic Hypothermia completed


   Hospital course noted for acute hypoxemic respiratory failure, aspiration 

pneumonia, anoxic encephalopathy and non-ST elevated myocardial infarction


   Responsive to verbal and tactile stimuli. 


   Orally intubated


   On cardiazem drip at 10 mg/min with HR low 100s


   One episode of  A Fib with RVR at 9AM, received KCL, Magnesium and the 

cardiazem drip was transiently increased to 12.5 mg/H


   Vent setting as follows: PRVC/AC 10, Tidal Volume 600, PEEP 5, FIO2 50%. O2 

sat 100%.


   ABG this morning 7.50/42/85/34/97.8


   Pt has not been tolerating vent weaning


   Tolerating tube feeding with Glucerna


   Afebrile


   Last 24H I/O 2800/1740 (+1060)


   12/17 Pt tolerated over an hour on CPAP/PS and was extubated however re-

intubated on 12/18 for labored breathing and patient's heart rate continued to 

increase.


   Patient is full code, social svs working on guardianship.








CXR : 12/21/2015 10:56:45


Multifocal opacities throughout both lungs essentially unchanged.


Layering effusion on the left.


ET tube with the distal tip at the level of the clavicles. Feeding tube with 

the distal tip below the diaphragm.


Catheter is seen coursing underneath the clavicle on the right with the distal 

tip overlying the projection of the SVC. 





CCU Objective





- Vital Signs / Intake & Output


Vital Signs (Last 4 hours): 


Vital Signs











  Pulse Resp BP Pulse Ox


 


 12/21/15 07:00  84  15  113/67  100


 


 12/21/15 06:00  84  14  128/73  100


 


 12/21/15 05:00  86  15  125/78  100











Intake and Output (Last 8hrs): 


 Intake & Output











 12/20/15 12/21/15 12/21/15





 22:59 06:59 14:59


 


Intake Total 1625 978 


 


Output Total 1040 700 


 


Balance 585 278 


 


Intake:   


 


   178 


 


  Intake, Piggyback 200 20 


 


  Tube Feeding 660 480 


 


  Free Water Flush 250 300 


 


Output:   


 


  Gastric Amount 140  


 


    Stomach 140  


 


  Urine 900 700 


 


    Urethral (Mcgee) 900 700 














- Physical Exam


Physical Exam Limitations: Positive for: Other (Sedated)


Head: Positive for: Atraumatic, Normocephalic


Pupils: Positive for: PERRL


Extroacular Muscles: Positive for: EOMI


Conjunctiva: Positive for: Normal.  Negative for: Injected, Icteric


Neck: Positive for: Normal Range of Motion, Trachea Midline.  Negative for: 

Meningeal Signs, MIDLINE TENDERNESS, Paraspinal Tenderness, JVD, Lymphadenopathy

, Bruit, Other


Respiratory/Chest: Positive for: Clear to Auscultation, Good Air Exchange.  

Negative for: Respiratory Distress, Accessory Muscle Use, Wheezes, Decreased 

Breath Sounds, Rales, Retracting, Rhonchi, Tachypneic, Tender to Palpation, 

Other


Cardiovascular: Positive for: Normal S1, S2, Irregular Rhythm, Peripheal Pulses 

Present.  Negative for: Murmurs


Abdomen: Positive for: Normal Bowel Sounds.  Negative for: Tenderness, 

Distention, Peritoneal Signs


Upper Extremity: Positive for: Normal Inspection.  Negative for: Cyanosis, Edema


Lower Extremity: Positive for: Normal Inspection.  Negative for: Edema, CALF 

TENDERNESS





- Medications


Active Medications: 


Active Medications











Generic Name Dose Route Start Last Admin





  Trade Name Freq  PRN Reason Stop Dose Admin


 


Acetaminophen  650 mg 12/13/15 13:21 12/18/15 02:28





  Tylenol 650mg/20.3ml Solution Ud  PO   650 mg





  Q6 PRN   Administration





  Fever   


 


Acetaminophen  650 mg 12/13/15 23:57 12/14/15 06:53





  Tylenol 650 Mg Supp  CA   650 mg





  Q6 PRN   Administration





  fevers   


 


Albuterol/Ipratropium  3 ml 12/18/15 08:00 12/21/15 01:30





  Duoneb 3 Mg/0.5 Mg (3 Ml) Ud  INH   3 ml





  RQ6 MAGDALENA   Administration


 


Aspirin  81 mg 12/13/15 13:30 12/20/15 08:58





  Ecotrin  PO   81 mg





  DAILY MAGDALENA   Administration


 


Enalapril Maleate  10 mg 12/10/15 11:15 12/20/15 09:01





  Vasotec  PO   10 mg





  DAILY MAGDALENA   Administration


 


Famotidine  20 mg 12/10/15 17:00 12/20/15 16:44





  Pepcid  GT   20 mg





  BID MAGDALENA   Administration


 


Propofol  100 mls @ 2.041 mls/hr 12/20/15 12:15 12/20/15 12:35





  Diprivan  IV 12/21/15 12:03  2.041 mls/hr





  .Q24H MAGDALENA   Administration





  Protocol   





  5 MCG/KG/MIN   


 


Ciprofloxacin  200 mls @ 200 mls/hr 12/20/15 21:00 12/20/15 20:48





  Cipro 400mg/200ml Dsw  IVPB   200 mls/hr





  Q12 MAGDALENA   Administration


 


Insulin Human Regular  0 units 12/10/15 22:00 12/21/15 07:10





  Humulin R  SC   Not Given





  ACHS MAGDALENA   





  Protocol   


 


Levetiracetam  500 mg 12/18/15 09:00 12/20/15 16:44





  Keppra  PO   500 mg





  BID [facility]DALENA   Administration


 


Metoprolol Tartrate  25 mg 12/20/15 09:00 12/20/15 20:51





  Lopressor  PO   25 mg





  Q12 [facility]ALENA   Administration


 


Multivitamins/Vitamin C  5 ml 12/10/15 09:00 12/20/15 09:00





  Multi-Delyn Liquid  PO   5 ml





  DAILY MAGDALENA   Administration


 


Nitroglycerin  2 in 12/10/15 16:00 12/21/15 04:00





  Nitro-Bid 2% Oint  TOP   2 in





  Q6 [facility]ALENA   Administration














- Patient Studies


Lab Studies: 


 Lab Studies











  12/21/15 12/21/15 12/21/15 Range/Units





  06:03 05:00 04:55 


 


WBC   13.2 H   (4.8-10.8)  K/uL


 


RBC   3.01 L   (4.40-5.90)  Mil/uL


 


Hgb   9.8 L   (12.0-18.0)  g/dL


 


Hct   30.8 L   (35.0-51.0)  %


 


MCV   102.3 H   (80.0-94.0)  fl


 


MCH   32.5 H   (27.0-31.0)  pg


 


MCHC   31.8 L   (33.0-37.0)  g/dL


 


RDW   13.2   (11.5-14.5)  %


 


Plt Count   541 H   (130-400)  K/uL


 


pCO2    42  (35-45)  mm/Hg


 


pO2    85  ()  mm/Hg


 


HCO3    31.8 H  (21-28)  mmol/L


 


ABG pH    7.50 H  (7.35-7.45)  


 


ABG Total CO2    34.1 H  (22-28)  mmol/L


 


ABG O2 Saturation    97.8  (95-98)  %


 


ABG O2 Content    14.3 L  (15-23)  ML/dL


 


ABG Base Excess    8.8 H  (-2.0-3.0)  mmol/L


 


ABG Hemoglobin    10.6 L  (11.7-17.4)  g/dL


 


ABG Carboxyhemoglobin    1.6 H  (0.5-1.5)  %


 


POC ABG HHb (Measured)    2.1  (0.0-5.0)  %


 


ABG Methemoglobin    0.7  (0.0-3.0)  %


 


ABG O2 Capacity    14.6 L  (16-24)  mL/dL


 


Chu Test    Yes  


 


A-a O2 Difference    219.0  mm/Hg


 


Hgb O2 Saturation    95.5  (95.0-98.0)  %


 


FiO2    50.0  %


 


Sodium   139   (132-148)  mmol/l


 


Potassium   3.4 L   (3.6-5.0)  MMOL/L


 


Chloride   99   ()  mmol/L


 


Carbon Dioxide   31 H   (22-30)  mmol/L


 


Anion Gap   12   (10-20)  


 


BUN   17   (9-20)  mg/dl


 


Creatinine   0.9   (0.8-1.5)  mg/dL


 


Est GFR (African Amer)   > 60   


 


Est GFR (Non-Af Amer)   > 60   


 


POC Glucose (mg/dL)  116 H    ()  mg/dL


 


Random Glucose   111 H   ()  mg/dL


 


Calcium   8.8   (8.4-10.2)  mg/dL














  12/20/15 12/20/15 12/20/15 Range/Units





  22:58 16:41 11:21 


 


WBC     (4.8-10.8)  K/uL


 


RBC     (4.40-5.90)  Mil/uL


 


Hgb     (12.0-18.0)  g/dL


 


Hct     (35.0-51.0)  %


 


MCV     (80.0-94.0)  fl


 


MCH     (27.0-31.0)  pg


 


MCHC     (33.0-37.0)  g/dL


 


RDW     (11.5-14.5)  %


 


Plt Count     (130-400)  K/uL


 


pCO2     (35-45)  mm/Hg


 


pO2     ()  mm/Hg


 


HCO3     (21-28)  mmol/L


 


ABG pH     (7.35-7.45)  


 


ABG Total CO2     (22-28)  mmol/L


 


ABG O2 Saturation     (95-98)  %


 


ABG O2 Content     (15-23)  ML/dL


 


ABG Base Excess     (-2.0-3.0)  mmol/L


 


ABG Hemoglobin     (11.7-17.4)  g/dL


 


ABG Carboxyhemoglobin     (0.5-1.5)  %


 


POC ABG HHb (Measured)     (0.0-5.0)  %


 


ABG Methemoglobin     (0.0-3.0)  %


 


ABG O2 Capacity     (16-24)  mL/dL


 


Chu Test     


 


A-a O2 Difference     mm/Hg


 


Hgb O2 Saturation     (95.0-98.0)  %


 


FiO2     %


 


Sodium     (132-148)  mmol/l


 


Potassium     (3.6-5.0)  MMOL/L


 


Chloride     ()  mmol/L


 


Carbon Dioxide     (22-30)  mmol/L


 


Anion Gap     (10-20)  


 


BUN     (9-20)  mg/dl


 


Creatinine     (0.8-1.5)  mg/dL


 


Est GFR (African Amer)     


 


Est GFR (Non-Af Amer)     


 


POC Glucose (mg/dL)  92  101  88  ()  mg/dL


 


Random Glucose     ()  mg/dL


 


Calcium     (8.4-10.2)  mg/dL








 Laboratory Results - last 24 hr











  12/20/15 12/20/15 12/20/15





  11:21 16:41 22:58


 


WBC   


 


RBC   


 


Hgb   


 


Hct   


 


MCV   


 


MCH   


 


MCHC   


 


RDW   


 


Plt Count   


 


pCO2   


 


pO2   


 


HCO3   


 


ABG pH   


 


ABG Total CO2   


 


ABG O2 Saturation   


 


ABG O2 Content   


 


ABG Base Excess   


 


ABG Hemoglobin   


 


ABG Carboxyhemoglobin   


 


POC ABG HHb (Measured)   


 


ABG Methemoglobin   


 


ABG O2 Capacity   


 


Chu Test   


 


A-a O2 Difference   


 


Hgb O2 Saturation   


 


FiO2   


 


Sodium   


 


Potassium   


 


Chloride   


 


Carbon Dioxide   


 


Anion Gap   


 


BUN   


 


Creatinine   


 


Est GFR ( Amer)   


 


Est GFR (Non-Af Amer)   


 


POC Glucose (mg/dL)  88  101  92


 


Random Glucose   


 


Calcium   














  12/21/15 12/21/15 12/21/15





  04:55 05:00 06:03


 


WBC   13.2 H 


 


RBC   3.01 L 


 


Hgb   9.8 L 


 


Hct   30.8 L 


 


MCV   102.3 H 


 


MCH   32.5 H 


 


MCHC   31.8 L 


 


RDW   13.2 


 


Plt Count   541 H 


 


pCO2  42  


 


pO2  85  


 


HCO3  31.8 H  


 


ABG pH  7.50 H  


 


ABG Total CO2  34.1 H  


 


ABG O2 Saturation  97.8  


 


ABG O2 Content  14.3 L  


 


ABG Base Excess  8.8 H  


 


ABG Hemoglobin  10.6 L  


 


ABG Carboxyhemoglobin  1.6 H  


 


POC ABG HHb (Measured)  2.1  


 


ABG Methemoglobin  0.7  


 


ABG O2 Capacity  14.6 L  


 


Chu Test  Yes  


 


A-a O2 Difference  219.0  


 


Hgb O2 Saturation  95.5  


 


FiO2  50.0  


 


Sodium   139 


 


Potassium   3.4 L 


 


Chloride   99 


 


Carbon Dioxide   31 H 


 


Anion Gap   12 


 


BUN   17 


 


Creatinine   0.9 


 


Est GFR ( Amer)   > 60 


 


Est GFR (Non-Af Amer)   > 60 


 


POC Glucose (mg/dL)    116 H


 


Random Glucose   111 H 


 


Calcium   8.8 











Fingerstick Blood Sugar Results: 116





Review of Systems





- Review of Systems


Systems not reviewed;Unavailable: Intubated





Critical Care Progress Note





- Ventilator Checklist


Head of Bed 30 Degrees: Yes


Daily Sedation Vacation: Yes


Daily Assessment of Readiness to Learn: Yes


Daily Spontaneous Breathing Trial: Yes


PUD Prophalyxis: Yes


DVT Prophylaxis: Yes


Oral Care with Chlorhexidine Gluconate {CHG}: Yes





- Extremities/Vascular


Does the Patient have a Central Venous Catheter?: Yes


Does the Patient need a Central Venous Catheter?: Yes


Does the Patient have a Mcgee Catheter?: Yes


Does the Patient need a Mcgee Catheter?: Yes





Assessment/Plan


(1) Acute respiratory failure


Current Visit: Yes   Status: Acute


Comment: 


Vent setting as follows: PRVC/AC 10, Tidal Volume 600, PEEP 5, FIO2 50%. O2 sat 

100%.


ABG this mornig 7.50/42/85/34/97.8


CXR with B/L congesion and small pleural effusion


Will start diamox x 4 doses











(2) Aspiration pneumonia


Current Visit: Yes   Status: Acute


Comment: 


cont IV Cefapime


BD NEBS Q 6H PRN








(3) Cardiac arrest


Current Visit: Yes   Status: Acute


Comment: hr noted in nursing notes to be fluctuating-more stable today


cardizem restarted for tachycardia. 


cardio on board and pt is w/o further episodes of VT


no cardio interventions planned at present.








(4) Cardiac arrhythmia


Current Visit: Yes   Status: Acute





(5) DVT prophylaxis


Current Visit: Yes   Status: Acute


Comment: lovenox








(6) Diabetes mellitus with hyperglycemia


Current Visit: Yes   Status: Acute


Comment: riss per protocol








(7) HTN (hypertension)


Current Visit: Yes   Status: Acute





(8) History of seizure


Current Visit: Yes   Status: Acute


Comment: neuro consult


?? eeg


started on keppra








(9) NSTEMI (non-ST elevated myocardial infarction)


Current Visit: Yes   Status: Acute





(10) Scrotal edema


Current Visit: Yes   Status: Acute


Comment: scrotal us-noted


urology consult-dr cortes to see pt when stable








(11) UTI (urinary tract infection)


Current Visit: Yes   Status: Acute


Comment: cont anbx


mcgee cath


uro consult pending








(12) Ventricular tachycardia


Current Visit: Yes   Status: Acute








- Assessment and Plan (Free Text)


Assessment: 


IMP: Failure to wean likely a result of combination of issues: sedation, muscle 

weakness, Anoxic brain injury, Oxygenation failure from Pneumonia with effusion 

and pulmonary congestion, deconditioning with vent support, nutrition issues 

and now A Fib with RVR.


Total critical care time 58 minutes

## 2015-12-21 NOTE — CARD
--------------- APPROVED REPORT --------------





EKG Measurement

Heart [id number]

[id number]

[id number]

[id number]



<Conclusion>

Atrial flutter with variable AV block

Voltage criteria for left ventricular hypertrophy

T wave abnormality, consider inferior ischemia

Abnormal ECG

## 2015-12-21 NOTE — CP.PCM.PN
Subjective





- Subjective


Subjective: 


No new clinical change.





Review of Systems





- Review of Systems


Systems not reviewed;Unavailable: Intubated





Objective





- Vital Signs/Intake and Output


Vital Signs (last 24 hours): 


 Vital Signs - 24 hr











  12/20/15 12/20/15 12/20/15





  17:00 18:00 20:00


 


Temperature   99.9 F H


 


Pulse Rate 99 H 102 H 110 H


 


Respiratory 19 20 21





Rate   


 


Blood Pressure 143/82 141/87 162/73 H


 


O2 Sat by Pulse 100 99 100





Oximetry   














  12/20/15 12/20/15 12/20/15





  20:51 21:00 22:00


 


Temperature   


 


Pulse Rate 94 H 97 H 93 H


 


Respiratory  22 18





Rate   


 


Blood Pressure 147/81 143/86 134/78


 


O2 Sat by Pulse  100 100





Oximetry   














  12/20/15 12/21/15 12/21/15





  23:00 00:00 01:00


 


Temperature  98.9 F 


 


Pulse Rate 96 H 95 H 92 H


 


Respiratory 28 H 20 16





Rate   


 


Blood Pressure 143/90 136/82 118/67


 


O2 Sat by Pulse 100 100 98





Oximetry   














  12/21/15 12/21/15 12/21/15





  02:00 03:00 04:00


 


Temperature   99.7 F H


 


Pulse Rate 90 88 89


 


Respiratory 15 18 16





Rate   


 


Blood Pressure 129/79 129/75 125/76


 


O2 Sat by Pulse 100 100 100





Oximetry   














  12/21/15 12/21/15 12/21/15





  05:00 06:00 07:00


 


Temperature   


 


Pulse Rate 86 84 84


 


Respiratory 15 14 15





Rate   


 


Blood Pressure 125/78 128/73 113/67


 


O2 Sat by Pulse 100 100 100





Oximetry   














  12/21/15 12/21/15 12/21/15





  08:00 09:00 10:00


 


Temperature 99.0 F  


 


Pulse Rate 140 H 142 H 92 H


 


Respiratory 21 98 H 19





Rate   


 


Blood Pressure 102/65 115/76 106/62


 


O2 Sat by Pulse 100 100 100





Oximetry   














  12/21/15 12/21/15 12/21/15





  11:00 12:00 12:48


 


Temperature  98.3 F 


 


Pulse Rate 91 H 91 H 87


 


Respiratory 17 19 





Rate   


 


Blood Pressure 106/62 120/74 120/74


 


O2 Sat by Pulse 98 98 





Oximetry   














  12/21/15 12/21/15 12/21/15





  13:00 14:00 15:00


 


Temperature   


 


Pulse Rate 110 H 108 H 103 H


 


Respiratory 18 18 18





Rate   


 


Blood Pressure 110/60 102/56 L 100/60


 


O2 Sat by Pulse 100 100 100





Oximetry   














  12/21/15





  16:00


 


Temperature 98.9 F


 


Pulse Rate 104 H


 


Respiratory 18





Rate 


 


Blood Pressure 100/60


 


O2 Sat by Pulse 100





Oximetry 











Intake and Output (last 12 hours): 


 Intake & Output











 12/20/15 12/21/15 12/21/15





 18:59 06:59 18:59


 


Intake Total 1195 1608 1773


 


Output Total 900 840 


 


Balance 


 


Intake:   


 


   218 210


 


  Intake, Piggyback  220 783


 


  Oral 100  120


 


  Tube Feeding 420 720 360


 


  Free Water Flush 200 450 300


 


Output:   


 


  Gastric Amount  140 


 


    Stomach  140 


 


  Urine 900 700 


 


    Urethral (Lua) 900 700 














- Medications


Medications: 


 Current Medications





Acetaminophen (Tylenol 650mg/20.3ml Solution Ud)  650 mg PO Q6 PRN


   PRN Reason: Fever


   Last Admin: 12/18/15 02:28 Dose:  650 mg


Acetaminophen (Tylenol 650 Mg Supp)  650 mg MI Q6 PRN


   PRN Reason: fevers


   Last Admin: 12/14/15 06:53 Dose:  650 mg


Albuterol/Ipratropium (Duoneb 3 Mg/0.5 Mg (3 Ml) Ud)  3 ml INH RQ6 [facility]


   Last Admin: 12/21/15 14:11 Dose:  3 ml


Aspirin (Ecotrin)  81 mg PO DAILY [facility]


   Last Admin: 12/21/15 09:59 Dose:  81 mg


Enalapril Maleate (Vasotec)  10 mg PO DAILY [facility]


   Last Admin: 12/21/15 10:03 Dose:  10 mg


Famotidine (Pepcid)  20 mg GT BID [facility]


   Last Admin: 12/21/15 10:03 Dose:  20 mg


Cefepime HCl 2 gm/ Sodium (Chloride)  100 mls @ 100 mls/hr IVPB Q12 MAGDALENA


Diltiazem HCl 125 mg/ Sodium (Chloride)  125 mls @ 10 mls/hr IV .[id number] ONE; 

10 MG/HR


   PRN Reason: Protocol


   Stop: 12/21/15 23:38


   Last Admin: 12/21/15 12:48 Dose:  10 mls/hr


Insulin Human Regular (Humulin R)  0 units SC ACHS MAGDALENA


   PRN Reason: Protocol


   Last Admin: 12/21/15 12:49 Dose:  Not Given


Levetiracetam (Keppra)  500 mg PO BID [facility]


   Last Admin: 12/21/15 10:00 Dose:  500 mg


Metoprolol Tartrate (Lopressor)  25 mg PO Q12 [facility]


   Last Admin: 12/21/15 10:00 Dose:  25 mg


Multivitamins/Vitamin C (Multi-Delyn Liquid)  5 ml PO DAILY [facility]


   Last Admin: 12/21/15 10:02 Dose:  5 ml


Nitroglycerin (Nitro-Bid 2% Oint)  2 in TOP Q6 [facility]


   Last Admin: 12/21/15 10:03 Dose:  2 in











- Labs


Labs (last 24 hours): 


 Laboratory Results - last 24 hr











  12/20/15 12/20/15 12/20/15





  05:20 16:41 22:58


 


WBC   


 


RBC   


 


Hgb   


 


Hct   


 


MCV   


 


MCH   


 


MCHC   


 


RDW   


 


Plt Count   


 


MPV   


 


Neut % (Auto)   


 


Lymph % (Auto)   


 


Mono % (Auto)   


 


Eos % (Auto)   


 


Baso % (Auto)   


 


Neut #   


 


Lymph #   


 


Mono #   


 


Eos #   


 


Baso #   


 


pCO2  42  


 


pO2  101 H  


 


HCO3  29.2 H  


 


ABG pH  7.46 H  


 


ABG Total CO2  31.2 H  


 


ABG O2 Saturation  98.2 H  


 


ABG O2 Content  16.6  


 


ABG Base Excess  5.5 H  


 


ABG Hemoglobin  12.2  


 


ABG Carboxyhemoglobin  1.6 H  


 


POC ABG HHb (Measured)  1.8  


 


ABG Methemoglobin  0.8  


 


ABG O2 Capacity  16.9  


 


Chu Test  Yes  


 


A-a O2 Difference  274.0  


 


Hgb O2 Saturation  95.8  


 


FiO2  60.0  


 


Sodium   


 


Potassium   


 


Chloride   


 


Carbon Dioxide   


 


Anion Gap   


 


BUN   


 


Creatinine   


 


Est GFR ( Amer)   


 


Est GFR (Non-Af Amer)   


 


POC Glucose (mg/dL)   101  92


 


Random Glucose   


 


Calcium   


 


Phosphorus   


 


Magnesium   


 


Total Bilirubin   


 


AST   


 


ALT   


 


Alkaline Phosphatase   


 


Total Protein   


 


Albumin   


 


Globulin   


 


Albumin/Globulin Ratio   














  12/21/15 12/21/15 12/21/15





  04:55 05:00 06:03


 


WBC   13.2 H 


 


RBC   3.01 L 


 


Hgb   9.8 L 


 


Hct   30.8 L 


 


MCV   102.3 H 


 


MCH   32.5 H 


 


MCHC   31.8 L 


 


RDW   13.2 


 


Plt Count   541 H 


 


MPV   


 


Neut % (Auto)   


 


Lymph % (Auto)   


 


Mono % (Auto)   


 


Eos % (Auto)   


 


Baso % (Auto)   


 


Neut #   


 


Lymph #   


 


Mono #   


 


Eos #   


 


Baso #   


 


pCO2  42  


 


pO2  85  


 


HCO3  31.8 H  


 


ABG pH  7.50 H  


 


ABG Total CO2  34.1 H  


 


ABG O2 Saturation  97.8  


 


ABG O2 Content  14.3 L  


 


ABG Base Excess  8.8 H  


 


ABG Hemoglobin  10.6 L  


 


ABG Carboxyhemoglobin  1.6 H  


 


POC ABG HHb (Measured)  2.1  


 


ABG Methemoglobin  0.7  


 


ABG O2 Capacity  14.6 L  


 


Chu Test  Yes  


 


A-a O2 Difference  219.0  


 


Hgb O2 Saturation  95.5  


 


FiO2  50.0  


 


Sodium   139 


 


Potassium   3.4 L 


 


Chloride   99 


 


Carbon Dioxide   31 H 


 


Anion Gap   12 


 


BUN   17 


 


Creatinine   0.9 


 


Est GFR ( Amer)   > 60 


 


Est GFR (Non-Af Amer)   > 60 


 


POC Glucose (mg/dL)    116 H


 


Random Glucose   111 H 


 


Calcium   8.8 


 


Phosphorus   


 


Magnesium   


 


Total Bilirubin   


 


AST   


 


ALT   


 


Alkaline Phosphatase   


 


Total Protein   


 


Albumin   


 


Globulin   


 


Albumin/Globulin Ratio   














  12/21/15 12/21/15





  12:15 15:45


 


WBC   12.9 H


 


RBC   2.89 L


 


Hgb   9.4 L


 


Hct   29.6 L


 


MCV   102.3 H


 


MCH   32.4 H


 


MCHC   31.6 L


 


RDW   12.8


 


Plt Count   500 H


 


MPV   7.6


 


Neut % (Auto)   77.8 H


 


Lymph % (Auto)   11.2 L


 


Mono % (Auto)   7.7


 


Eos % (Auto)   2.6


 


Baso % (Auto)   0.7


 


Neut #   10.0 H


 


Lymph #   1.4


 


Mono #   1.0 H


 


Eos #   0.3


 


Baso #   0.1


 


pCO2  


 


pO2  


 


HCO3  


 


ABG pH  


 


ABG Total CO2  


 


ABG O2 Saturation  


 


ABG O2 Content  


 


ABG Base Excess  


 


ABG Hemoglobin  


 


ABG Carboxyhemoglobin  


 


POC ABG HHb (Measured)  


 


ABG Methemoglobin  


 


ABG O2 Capacity  


 


Chu Test  


 


A-a O2 Difference  


 


Hgb O2 Saturation  


 


FiO2  


 


Sodium   140


 


Potassium   3.3 L


 


Chloride   99


 


Carbon Dioxide   29


 


Anion Gap   15


 


BUN   15


 


Creatinine   1.0


 


Est GFR ( Amer)   > 60


 


Est GFR (Non-Af Amer)   > 60


 


POC Glucose (mg/dL)  99 


 


Random Glucose   131 H


 


Calcium   8.5


 


Phosphorus   3.3


 


Magnesium   2.5 H


 


Total Bilirubin   0.4


 


AST   42


 


ALT   35


 


Alkaline Phosphatase   119


 


Total Protein   6.5


 


Albumin   3.0 L


 


Globulin   3.5


 


Albumin/Globulin Ratio   0.9 L














- Constitutional


Appears: Toxic





- Head Exam


Head Exam: NORMAL INSPECTION





- Eye Exam


Eye Exam: Normal appearance





- ENT Exam


ENT Exam: Mucous Membranes Dry





- Neck Exam


Neck exam: Full Rom





- Respiratory Exam


Respiratory Exam: Decreased Breath Sounds





- Cardiovascular Exam


Cardiovascular Exam: REGULAR RHYTHM





- GI/Abdominal Exam


GI & Abdominal Exam: Normal Bowel Sounds





- Rectal Exam


Rectal Exam: Deferred





- Extremities Exam


Extremities exam: absent: pedal edema





- Back Exam


Back exam: NORMAL INSPECTION





- Neurological Exam


Neurological exam: Alert, Oriented x3





- Psychiatric Exam


Psychiatric exam: Normal Affect





- Skin


Skin Exam: Normal Color





Assessment/Plan


(1) Cardiac arrest


Assessment and plan: 


 





Patient has no current mental status.  I advise conservative medical therapy. 

No plans for cardiac cath.








Current Visit: Yes   Status: Acute





(2) NSTEMI (non-ST elevated myocardial infarction)


Assessment and plan: 


Patient may have underlying CAD as a cause of his acute event and cardiac 

arrest.  





Conservative therapy.


Current Visit: Yes   Status: Acute





(3) Ventricular tachycardia


Assessment and plan: 


if recurrence recommend amiodarone drip.


Current Visit: Yes   Status: Acute





(4) HTN (hypertension)


Assessment and plan: 


add metoprolol


Current Visit: Yes   Status: Acute

## 2015-12-21 NOTE — CP.PCM.PN
Subjective





- Subjective


Subjective: 


on vent, sedated


increased congestion





Review of Systems





- Review of Systems


All systems: reviewed and no additional remarkable complaints except





- Constitutional


Constitutional: absent: Fever





Objective





- Vital Signs/Intake and Output


Vital Signs (last 24 hours): 


 Vital Signs - 24 hr











  12/20/15 12/20/15 12/20/15





  11:35 11:59 12:00


 


Temperature   98.8 F


 


Pulse Rate 165 H 160 H 148 H


 


Respiratory   17





Rate   


 


Blood Pressure 140/72 140/72 140/72


 


O2 Sat by Pulse   100





Oximetry   














  12/20/15 12/20/15 12/20/15





  14:00 15:12 16:12


 


Temperature   99.1 F


 


Pulse Rate 77 133 H 136 H


 


Respiratory 18 16 14





Rate   


 


Blood Pressure 160/75 H 113/76 150/73


 


O2 Sat by Pulse 99 100 100





Oximetry   














  12/20/15 12/20/15 12/20/15





  17:00 18:00 20:00


 


Temperature   99.9 F H


 


Pulse Rate 99 H 102 H 110 H


 


Respiratory 19 20 21





Rate   


 


Blood Pressure 143/82 141/87 162/73 H


 


O2 Sat by Pulse 100 99 100





Oximetry   














  12/20/15 12/20/15 12/20/15





  20:51 21:00 22:00


 


Temperature   


 


Pulse Rate 94 H 97 H 93 H


 


Respiratory  22 18





Rate   


 


Blood Pressure 147/81 143/86 134/78


 


O2 Sat by Pulse  100 100





Oximetry   














  12/20/15 12/21/15 12/21/15





  23:00 00:00 01:00


 


Temperature  98.9 F 


 


Pulse Rate 96 H 95 H 92 H


 


Respiratory 28 H 20 16





Rate   


 


Blood Pressure 143/90 136/82 118/67


 


O2 Sat by Pulse 100 100 98





Oximetry   














  12/21/15 12/21/15 12/21/15





  02:00 03:00 04:00


 


Temperature   99.7 F H


 


Pulse Rate 90 88 89


 


Respiratory 15 18 16





Rate   


 


Blood Pressure 129/79 129/75 125/76


 


O2 Sat by Pulse 100 100 100





Oximetry   














  12/21/15 12/21/15 12/21/15





  05:00 06:00 07:00


 


Temperature   


 


Pulse Rate 86 84 84


 


Respiratory 15 14 15





Rate   


 


Blood Pressure 125/78 128/73 113/67


 


O2 Sat by Pulse 100 100 100





Oximetry   














  12/21/15 12/21/15 12/21/15





  08:00 09:00 10:00


 


Temperature 99.0 F  


 


Pulse Rate 140 H 142 H 103 H


 


Respiratory 21 98 H 





Rate   


 


Blood Pressure 102/65 115/76 95/60 L


 


O2 Sat by Pulse 100 100 





Oximetry   











Intake and Output (last 12 hours): 


 Intake & Output











 12/20/15 12/21/15 12/21/15





 18:59 06:59 18:59


 


Intake Total 1195 1608 93


 


Output Total 900 840 


 


Balance 295 768 93


 


Intake:   


 


   218 20


 


  Intake, Piggyback  220 73


 


  Oral 100  


 


  Tube Feeding 420 720 


 


  Free Water Flush 200 450 


 


Output:   


 


  Gastric Amount  140 


 


    Stomach  140 


 


  Urine 900 700 


 


    Urethral (Lua) 900 700 














- Medications


Medications: 


 Current Medications





Acetaminophen (Tylenol 650mg/20.3ml Solution Ud)  650 mg PO Q6 PRN


   PRN Reason: Fever


   Last Admin: 12/18/15 02:28 Dose:  650 mg


Acetaminophen (Tylenol 650 Mg Supp)  650 mg WA Q6 PRN


   PRN Reason: fevers


   Last Admin: 12/14/15 06:53 Dose:  650 mg


Albuterol/Ipratropium (Duoneb 3 Mg/0.5 Mg (3 Ml) Ud)  3 ml INH RQ6 [facility]


   Last Admin: 12/21/15 08:09 Dose:  3 ml


Aspirin (Ecotrin)  81 mg PO DAILY [facility]


   Last Admin: 12/21/15 09:59 Dose:  81 mg


Enalapril Maleate (Vasotec)  10 mg PO DAILY [facility]


   Last Admin: 12/21/15 10:03 Dose:  10 mg


Famotidine (Pepcid)  20 mg GT BID [facility]


   Last Admin: 12/21/15 10:03 Dose:  20 mg


Propofol (Diprivan)  100 mls @ 2.041 mls/hr IV .Q24H [facility]; 5 MCG/KG/MIN


   PRN Reason: Protocol


   Stop: 12/21/15 12:03


   Last Admin: 12/20/15 12:35 Dose:  2.041 mls/hr


Ciprofloxacin (Cipro 400mg/200ml Dsw)  200 mls @ 200 mls/hr IVPB Q12 [facility]


   Last Admin: 12/21/15 10:04 Dose:  200 mls/hr


Potassium Chloride (Potassium Chloride 10 Meq/100 Ml)  100 mls @ 100 mls/hr 

IVPB Q1 [facility]


   Stop: 12/21/15 13:59


Magnesium Sulfate (Magnesium Sulfate 2 Gm/50 Ml Water)  50 mls @ 50 mls/hr IVPB 

ONCE ONE


   Stop: 12/21/15 11:07


Insulin Human Regular (Humulin R)  0 units SC ACHS [facility]


   PRN Reason: Protocol


   Last Admin: 12/21/15 07:10 Dose:  Not Given


Levetiracetam (Keppra)  500 mg PO BID [facility]


   Last Admin: 12/21/15 10:00 Dose:  500 mg


Metoprolol Tartrate (Lopressor)  25 mg PO Q12 [facility]


   Last Admin: 12/21/15 10:00 Dose:  25 mg


Multivitamins/Vitamin C (Multi-Delyn Liquid)  5 ml PO DAILY [facility]


   Last Admin: 12/21/15 10:02 Dose:  5 ml


Nitroglycerin (Nitro-Bid 2% Oint)  2 in TOP Q6 [facility]


   Last Admin: 12/21/15 10:03 Dose:  2 in











- Labs


Labs (last 24 hours): 


 Laboratory Results - last 24 hr











  12/20/15 12/20/15 12/20/15





  05:20 11:21 16:41


 


WBC   


 


RBC   


 


Hgb   


 


Hct   


 


MCV   


 


MCH   


 


MCHC   


 


RDW   


 


Plt Count   


 


pCO2  42  


 


pO2  101 H  


 


HCO3  29.2 H  


 


ABG pH  7.46 H  


 


ABG Total CO2  31.2 H  


 


ABG O2 Saturation  98.2 H  


 


ABG O2 Content  16.6  


 


ABG Base Excess  5.5 H  


 


ABG Hemoglobin  12.2  


 


ABG Carboxyhemoglobin  1.6 H  


 


POC ABG HHb (Measured)  1.8  


 


ABG Methemoglobin  0.8  


 


ABG O2 Capacity  16.9  


 


Chu Test  Yes  


 


A-a O2 Difference  274.0  


 


Hgb O2 Saturation  95.8  


 


FiO2  60.0  


 


Sodium   


 


Potassium   


 


Chloride   


 


Carbon Dioxide   


 


Anion Gap   


 


BUN   


 


Creatinine   


 


Est GFR ( Amer)   


 


Est GFR (Non-Af Amer)   


 


POC Glucose (mg/dL)   88  101


 


Random Glucose   


 


Calcium   














  12/20/15 12/21/15 12/21/15





  22:58 04:55 05:00


 


WBC    13.2 H


 


RBC    3.01 L


 


Hgb    9.8 L


 


Hct    30.8 L


 


MCV    102.3 H


 


MCH    32.5 H


 


MCHC    31.8 L


 


RDW    13.2


 


Plt Count    541 H


 


pCO2   42 


 


pO2   85 


 


HCO3   31.8 H 


 


ABG pH   7.50 H 


 


ABG Total CO2   34.1 H 


 


ABG O2 Saturation   97.8 


 


ABG O2 Content   14.3 L 


 


ABG Base Excess   8.8 H 


 


ABG Hemoglobin   10.6 L 


 


ABG Carboxyhemoglobin   1.6 H 


 


POC ABG HHb (Measured)   2.1 


 


ABG Methemoglobin   0.7 


 


ABG O2 Capacity   14.6 L 


 


Chu Test   Yes 


 


A-a O2 Difference   219.0 


 


Hgb O2 Saturation   95.5 


 


FiO2   50.0 


 


Sodium    139


 


Potassium    3.4 L


 


Chloride    99


 


Carbon Dioxide    31 H


 


Anion Gap    12


 


BUN    17


 


Creatinine    0.9


 


Est GFR ( Amer)    > 60


 


Est GFR (Non-Af Amer)    > 60


 


POC Glucose (mg/dL)  92  


 


Random Glucose    111 H


 


Calcium    8.8














  12/21/15





  06:03


 


WBC 


 


RBC 


 


Hgb 


 


Hct 


 


MCV 


 


MCH 


 


MCHC 


 


RDW 


 


Plt Count 


 


pCO2 


 


pO2 


 


HCO3 


 


ABG pH 


 


ABG Total CO2 


 


ABG O2 Saturation 


 


ABG O2 Content 


 


ABG Base Excess 


 


ABG Hemoglobin 


 


ABG Carboxyhemoglobin 


 


POC ABG HHb (Measured) 


 


ABG Methemoglobin 


 


ABG O2 Capacity 


 


Chu Test 


 


A-a O2 Difference 


 


Hgb O2 Saturation 


 


FiO2 


 


Sodium 


 


Potassium 


 


Chloride 


 


Carbon Dioxide 


 


Anion Gap 


 


BUN 


 


Creatinine 


 


Est GFR ( Amer) 


 


Est GFR (Non-Af Amer) 


 


POC Glucose (mg/dL)  116 H


 


Random Glucose 


 


Calcium 














- Constitutional


Appears: Non-toxic, Chronically Ill





- Head Exam


Head Exam: NORMOCEPHALIC





- Eye Exam


Eye Exam: absent: Scleral icterus





- ENT Exam


ENT Exam: Mucous Membranes Dry





- Neck Exam


Neck exam: absent: Lymphadenopathy





- Respiratory Exam


Respiratory Exam: Decreased Breath Sounds, Clear to PA & Lateral





- Cardiovascular Exam


Cardiovascular Exam: REGULAR RHYTHM, +S1, +S2





- GI/Abdominal Exam


GI & Abdominal Exam: Normal Bowel Sounds, Soft.  absent: Tenderness





- Rectal Exam


Rectal Exam: Deferred





-  Exam


 Exam: NORMAL INSPECTION





- Extremities Exam


Extremities exam: pedal pulses present.  absent: pedal edema, tenderness





- Back Exam


Back exam: absent: CVA tenderness (L), CVA tenderness (R)





- Neurological Exam


Neurological exam: Altered





Assessment/Plan


(1) Agitation


Current Visit: Yes   Status: Acute


Comment: pt restarted on propofol for agitation today








(2) Aspiration pneumonia


Current Visit: Yes   Status: Acute


Comment: slightly improved cxr, restrated on ly on cipro


monitor cxr


cont to follow ID recommednations








(3) Cardiac arrest


Current Visit: Yes   Status: Acute


Comment: hr noted in nursing notes to be fluctuating-more stable today


cardizem restarted for tachycardia. 


cardio on board and pt is w/o further episodes of VT


no cardio interventions planned at present.








(4) Diabetes mellitus with hyperglycemia


Current Visit: Yes   Status: Acute


Comment: riss per protocol








(5) History of seizure


Current Visit: Yes   Status: Acute


Comment: neuro consult


?? eeg


started on keppra








(6) NSTEMI (non-ST elevated myocardial infarction)


Current Visit: Yes   Status: Acute

## 2015-12-22 LAB
ALBUMIN SERPL-MCNC: 3 G/DL (ref 3.5–5)
ALBUMIN/GLOB SERPL: 0.8 {RATIO} (ref 1–2.1)
ALT SERPL-CCNC: 32 U/L (ref 21–72)
ARTERIAL BLOOD GAS HEMOGLOBIN: 9.8 G/DL (ref 11.7–17.4)
ARTERIAL BLOOD GAS O2 SAT: 97.8 % (ref 95–98)
ARTERIAL BLOOD GAS PCO2: 44 MM/HG (ref 35–45)
ARTERIAL BLOOD GAS TCO2: 32.7 MMOL/L (ref 22–28)
ARTERIAL PATENCY WRIST A: YES
AST SERPL-CCNC: 36 U/L (ref 17–59)
BUN SERPL-MCNC: 13 MG/DL (ref 9–20)
CALCIUM SERPL-MCNC: 8.7 MG/DL (ref 8.4–10.2)
ERYTHROCYTE [DISTWIDTH] IN BLOOD BY AUTOMATED COUNT: 13.4 % (ref 11.5–14.5)
GFR NON-AFRICAN AMERICAN: > 60
HCO3 BLDA-SCNC: 30.1 MMOL/L (ref 21–28)
HGB BLD-MCNC: 9.3 G/DL (ref 12–18)
INHALED O2 CONCENTRATION: 50 %
MCH RBC QN AUTO: 32.3 PG (ref 27–31)
MCHC RBC AUTO-ENTMCNC: 31.2 G/DL (ref 33–37)
MCV RBC AUTO: 103.3 FL (ref 80–94)
O2 CAP BLDA-SCNC: 13.6 ML/DL (ref 16–24)
O2 CT BLDA-SCNC: 13.3 ML/DL (ref 15–23)
PH BLDA: 7.46 [PH] (ref 7.35–7.45)
PLATELET # BLD: 553 K/UL (ref 130–400)
PO2 BLDA: 83 MM/HG (ref 80–100)
RBC # BLD AUTO: 2.89 MIL/UL (ref 4.4–5.9)
WBC # BLD AUTO: 12.2 K/UL (ref 4.8–10.8)

## 2015-12-22 RX ADMIN — IPRATROPIUM BROMIDE AND ALBUTEROL SULFATE SCH ML: .5; 3 SOLUTION RESPIRATORY (INHALATION) at 01:12

## 2015-12-22 RX ADMIN — NITROGLYCERIN SCH IN: 20 OINTMENT TOPICAL at 09:36

## 2015-12-22 RX ADMIN — NITROGLYCERIN SCH IN: 20 OINTMENT TOPICAL at 16:00

## 2015-12-22 RX ADMIN — NITROGLYCERIN SCH: 20 OINTMENT TOPICAL at 04:00

## 2015-12-22 RX ADMIN — NITROGLYCERIN SCH IN: 20 OINTMENT TOPICAL at 22:00

## 2015-12-22 RX ADMIN — IPRATROPIUM BROMIDE AND ALBUTEROL SULFATE SCH ML: .5; 3 SOLUTION RESPIRATORY (INHALATION) at 19:56

## 2015-12-22 RX ADMIN — LEVETIRACETAM SCH MG: 100 SOLUTION ORAL at 09:35

## 2015-12-22 RX ADMIN — IPRATROPIUM BROMIDE AND ALBUTEROL SULFATE SCH ML: .5; 3 SOLUTION RESPIRATORY (INHALATION) at 07:41

## 2015-12-22 RX ADMIN — LEVETIRACETAM SCH MG: 100 SOLUTION ORAL at 17:00

## 2015-12-22 RX ADMIN — Medication SCH ML: at 09:36

## 2015-12-22 RX ADMIN — IPRATROPIUM BROMIDE AND ALBUTEROL SULFATE SCH ML: .5; 3 SOLUTION RESPIRATORY (INHALATION) at 13:20

## 2015-12-22 NOTE — PN
DATE: 12/22/2015



TIME SPENT:  35 minutes.



The patient is seen, examined at the bedside.  Events overnight reviewed.  
Failed attempts to deliver free from mechanical ventilation.  Currently on AC 10
, tidal volume 550, FIO2 50%, PEEP 5, oxygen saturation 100%.  Sedated on 
Diprivan drip.  Telemetry shows sinus tachycardia.  No further cardiac 
arrhythmias noted.



PHYSICAL EXAMINATION:

VITAL SIGNS:  Temperature 98.3, heart rate 88, respiratory rate 15, blood 
pressure 115/73, oxygen saturation 98%-100%, exhaled tidal volume 420, minute 
ventilation 9.2 liters, peak airway pressure 38, mean airway pressure 19, 
plateau pressure 31, end tidal CO2 18.

HEENT:  Pupils reactive.  Conjunctivae pink.  Sclerae white.  Endotracheal tube 
in place.  Right subclavian in place.  No erythema noted.

CHEST:  Bilateral breath sounds.  Clear to auscultation, diminished intensity.

HEART:  Rhythm regular.  S1, S2 normal intensity.

ABDOMEN:  Bowel sounds present, soft.  Liver, spleen not palpable.

EXTREMITIES:  Trace edema.

NEUROLOGIC:  Reportedly opens eyes and agitated off Diprivan.  Focuses to the 
side when his name is called.



CURRENT LABORATORY DATA:  WBC 12.2, hemoglobin 9.3, hematocrit 29.8, platelet 
553.  PT 11.2, INR 1.05, PTT 36.2.  ABG:  pH 7.46, pCO2 40, pO2 83, saturation 
97.8% on AC 10/550/50%.



IMPRESSION:  Ventilator-dependent respiratory failure, status post cardiac 
arrest reportedly with pulseless ventricular tachycardia, diabetes mellitus 
type 2 with uncontrolled hyperglycemia, hypertension controlled, seizure on IV 
Keppra.  Discussed with social service and not able to reach any next of kin.  
We will continue with the nasogastric feeding and ventilator support until his 
family is able to discuss the patient's condition and the requirement for _PEG 
and tracheostomy.





__________________________________________

Samuel Courtney MD







cc:   



DD: 12/22/2015 11:17:05  170

TT: 12/22/2015 11:34:28

Job # [id number]

en

MTDD

## 2015-12-22 NOTE — CP.PCM.PN
Subjective





- Subjective


Subjective: 


pt comfortable on vent/sedation. no distress noted. vs, cxr, b/w noted.  care d/

c w/ intensivist.  





Review of Systems





- Constitutional


Constitutional: UN





- EENT


Eyes: UNREMARKABLE


Ears: UNREMARKABLE


Nose/Mouth/Throat: UNREMARKABLE





- Cardiovascular


Cardiovascular: UNREMARKABLE





- Respiratory


Respiratory: As Per HPI, Cough, Chest Congestion, Excessive Mucous Production





- Gastrointestinal


Gastrointestinal: UNREMARKABLE





- Genitourinary


Genitourinary: UNREMARKABLE





- Reproductive: Male


Reproductive:Male: UNREMARKABLE





- Musculoskeletal


Musculoskeletal: UNREMARKABLE





- Integumentary


Integumentary: UNREMARKABLE





- Neurological


Neurological: UNREMARKABLE





- Psychiatric


Psychiatric: UNREMARKABLE





- Endocrine


Endocrine: UNREMARKABLE





- Hematologic/Lymphatic


Hematologic: UNREMARKABLE





Objective





- Vital Signs/Intake and Output


Vital Signs (last 24 hours): 


 Vital Signs - 24 hr











  12/21/15 12/21/15 12/21/15





  12:00 12:48 13:00


 


Temperature 98.3 F  


 


Pulse Rate 91 H 87 110 H


 


Respiratory 19  18





Rate   


 


Blood Pressure 120/74 120/74 110/60


 


O2 Sat by Pulse 98  100





Oximetry   














  12/21/15 12/21/15 12/21/15





  14:00 15:00 16:00


 


Temperature   98.9 F


 


Pulse Rate 108 H 103 H 104 H


 


Respiratory 18 18 18





Rate   


 


Blood Pressure 102/56 L 100/60 100/60


 


O2 Sat by Pulse 100 100 100





Oximetry   














  12/21/15 12/21/15 12/21/15





  17:00 18:00 18:29


 


Temperature   


 


Pulse Rate 99 H 97 H 96 H


 


Respiratory 16 17 





Rate   


 


Blood Pressure 117/70 112/60 112/70


 


O2 Sat by Pulse 100 100 





Oximetry   














  12/21/15 12/21/15 12/21/15





  20:00 21:00 22:00


 


Temperature 98.9 F  


 


Pulse Rate 91 H 86 90


 


Respiratory 14 14 16





Rate   


 


Blood Pressure 94/58 L 94/57 L 106/66


 


O2 Sat by Pulse 99 98 100





Oximetry   














  12/21/15 12/21/15 12/22/15





  22:16 23:00 00:00


 


Temperature   98.8 F


 


Pulse Rate 86 91 H 89


 


Respiratory  16 15





Rate   


 


Blood Pressure 94/57 L 106/61 101/61


 


O2 Sat by Pulse  100 100





Oximetry   














  12/22/15 12/22/15 12/22/15





  01:00 02:00 03:00


 


Temperature   


 


Pulse Rate 88 88 84


 


Respiratory 22 15 19





Rate   


 


Blood Pressure 104/58 L 102/60 104/64


 


O2 Sat by Pulse 100 100 100





Oximetry   














  12/22/15 12/22/15 12/22/15





  04:00 05:00 06:00


 


Temperature  97.9 F 


 


Pulse Rate 84 83 82


 


Respiratory 16 16 13





Rate   


 


Blood Pressure 103/63 115/66 107/71


 


O2 Sat by Pulse 100 100 100





Oximetry   














  12/22/15 12/22/15 12/22/15





  07:00 08:00 09:35


 


Temperature  98.3 F 


 


Pulse Rate 86 93 H 88


 


Respiratory 17 15 





Rate   


 


Blood Pressure 128/86 124/78 115/73


 


O2 Sat by Pulse 100 98 





Oximetry   











Intake and Output (last 12 hours): 


 Intake & Output











 12/21/15 12/22/15 12/22/15





 18:59 06:59 18:59


 


Intake Total 1963 1592 151


 


Output Total 1400 1005 1005


 


Balance 563 587 -854


 


Intake:   


 


   382 31


 


  Intake, Piggyback 803 100 


 


  Oral 120  


 


  Tube Feeding 480 660 120


 


  Free Water Flush 300 450 


 


Output:   


 


  Gastric Amount  5 5


 


    Stomach  5 5


 


  Urine 1400 1000 1000


 


    Urethral (Mcgee) 1400 1000 1000


 


Other:   


 


  # Bowel Movements  0 0














- Medications


Medications: 


 Current Medications





Acetaminophen (Tylenol 650mg/20.3ml Solution Ud)  650 mg PO Q6 PRN


   PRN Reason: Fever


   Last Admin: 12/18/15 02:28 Dose:  650 mg


Acetaminophen (Tylenol 650 Mg Supp)  650 mg [place] Q6 PRN


   PRN Reason: fevers


   Last Admin: 12/14/15 06:53 Dose:  650 mg


Acetazolamide (Diamox 250 Mg Tab)  250 mg PO Q8 [facility]


   Stop: 12/23/15 01:01


   Last Admin: 12/22/15 09:35 Dose:  250 mg


Albuterol/Ipratropium (Duoneb 3 Mg/0.5 Mg (3 Ml) Ud)  3 ml INH RQ6 [facility]


   Last Admin: 12/22/15 07:41 Dose:  3 ml


Aspirin (Ecotrin)  81 mg PO DAILY [facility]


   Last Admin: 12/22/15 09:35 Dose:  81 mg


Enalapril Maleate (Vasotec)  10 mg PO DAILY [facility]


   Last Admin: 12/22/15 09:36 Dose:  10 mg


Famotidine (Pepcid)  20 mg GT BID [facility]


   Last Admin: 12/22/15 09:36 Dose:  20 mg


Cefepime HCl 2 gm/ Sodium (Chloride)  100 mls @ 100 mls/hr IVPB Q12 [facility]


   Last Admin: 12/22/15 09:36 Dose:  100 mls/hr


Propofol (Diprivan)  100 mls @ 2.041 mls/hr IV .Q24H [facility]; 5 MCG/KG/MIN


   PRN Reason: Protocol


   Stop: 12/22/15 22:40


   Last Admin: 12/22/15 00:00 Dose:  20.412 mls/hr


Insulin Human Regular (Humulin R)  0 units SC ACHS [facility]


   PRN Reason: Protocol


   Last Admin: 12/22/15 07:04 Dose:  Not Given


Levetiracetam (Keppra)  500 mg PO BID [facility]


   Last Admin: 12/22/15 09:35 Dose:  500 mg


Metoprolol Tartrate (Lopressor)  25 mg PO Q12 [facility]


   Last Admin: 12/22/15 09:35 Dose:  25 mg


Multivitamins/Vitamin C (Multi-Delyn Liquid)  5 ml PO DAILY [facility]


   Last Admin: 12/22/15 09:36 Dose:  5 ml


Nitroglycerin (Nitro-Bid 2% Oint)  2 in TOP Q6 [facility]


   Last Admin: 12/22/15 09:36 Dose:  2 in











- Labs


Labs (last 24 hours): 


 Laboratory Results - last 24 hr











  12/21/15 12/21/15 12/21/15





  12:15 15:45 23:23


 


WBC   12.9 H 


 


RBC   2.89 L 


 


Hgb   9.4 L 


 


Hct   29.6 L 


 


MCV   102.3 H 


 


MCH   32.4 H 


 


MCHC   31.6 L 


 


RDW   12.8 


 


Plt Count   500 H 


 


MPV   7.6 


 


Neut % (Auto)   77.8 H 


 


Lymph % (Auto)   11.2 L 


 


Mono % (Auto)   7.7 


 


Eos % (Auto)   2.6 


 


Baso % (Auto)   0.7 


 


Neut #   10.0 H 


 


Lymph #   1.4 


 


Mono #   1.0 H 


 


Eos #   0.3 


 


Baso #   0.1 


 


pCO2   


 


pO2   


 


HCO3   


 


ABG pH   


 


ABG Total CO2   


 


ABG O2 Saturation   


 


ABG O2 Content   


 


ABG Base Excess   


 


ABG Hemoglobin   


 


ABG Carboxyhemoglobin   


 


POC ABG HHb (Measured)   


 


ABG Methemoglobin   


 


ABG O2 Capacity   


 


Chu Test   


 


A-a O2 Difference   


 


Hgb O2 Saturation   


 


FiO2   


 


Blood Gas Comments   


 


Crit Value Called To   


 


Crit Value Read Back   


 


Sodium   140 


 


Potassium   3.3 L 


 


Chloride   99 


 


Carbon Dioxide   29 


 


Anion Gap   15 


 


BUN   15 


 


Creatinine   1.0 


 


Est GFR ( Amer)   > 60 


 


Est GFR (Non-Af Amer)   > 60 


 


POC Glucose (mg/dL)  99   105


 


Random Glucose   131 H 


 


Calcium   8.5 


 


Phosphorus   3.3 


 


Magnesium   2.5 H 


 


Total Bilirubin   0.4 


 


AST   42 


 


ALT   35 


 


Alkaline Phosphatase   119 


 


Total Protein   6.5 


 


Albumin   3.0 L 


 


Globulin   3.5 


 


Albumin/Globulin Ratio   0.9 L 














  12/22/15 12/22/15 12/22/15





  04:05 04:19 04:37


 


WBC  12.2 H  


 


RBC  2.89 L  


 


Hgb  9.3 L  


 


Hct  29.8 L  


 


MCV  103.3 H  


 


MCH  32.3 H  


 


MCHC  31.2 L  


 


RDW  13.4  


 


Plt Count  553 H  


 


MPV   


 


Neut % (Auto)   


 


Lymph % (Auto)   


 


Mono % (Auto)   


 


Eos % (Auto)   


 


Baso % (Auto)   


 


Neut #   


 


Lymph #   


 


Mono #   


 


Eos #   


 


Baso #   


 


pCO2    44


 


pO2    83


 


HCO3    30.1 H


 


ABG pH    7.46 H


 


ABG Total CO2    32.7 H


 


ABG O2 Saturation    97.8


 


ABG O2 Content    13.3 L


 


ABG Base Excess    6.7 H


 


ABG Hemoglobin    9.8 L


 


ABG Carboxyhemoglobin    1.4


 


POC ABG HHb (Measured)    2.2


 


ABG Methemoglobin    0.6


 


ABG O2 Capacity    13.6 L


 


Chu Test    Yes


 


A-a O2 Difference    219.0


 


Hgb O2 Saturation    95.7


 


FiO2    50.0


 


Blood Gas Comments    Prv/ac/10/550/50%/+


 


Crit Value Called To    Leola cho


 


Crit Value Read Back    N


 


Sodium  143  


 


Potassium  3.8  


 


Chloride  104  


 


Carbon Dioxide  30  


 


Anion Gap  13  


 


BUN  13  


 


Creatinine  1.2  


 


Est GFR ( Amer)  > 60  


 


Est GFR (Non-Af Amer)  > 60  


 


POC Glucose (mg/dL)   96 


 


Random Glucose  110  


 


Calcium  8.7  


 


Phosphorus   


 


Magnesium   


 


Total Bilirubin  0.3  


 


AST  36  


 


ALT  32  


 


Alkaline Phosphatase  112  


 


Total Protein  6.7  


 


Albumin  3.0 L  


 


Globulin  3.7  


 


Albumin/Globulin Ratio  0.8 L  














- Constitutional


Appears: Non-toxic, No Acute Distress, Chronically Ill





- Head Exam


Head Exam: ATRAUMATIC, NORMAL INSPECTION, NORMOCEPHALIC





- Eye Exam


Eye Exam: EOMI





- Respiratory Exam


Respiratory Exam: Rhonchi


Additional comments: 


vented





- Cardiovascular Exam


Cardiovascular Exam: REGULAR RHYTHM, RRR





- GI/Abdominal Exam


GI & Abdominal Exam: Normal Bowel Sounds, Soft, Unremarkable





- Neurological Exam


Additional comments: 


sedated





- Skin


Skin Exam: Dry, Intact, Normal Color, Warm





Assessment/Plan


(1) Cardiac arrest


Current Visit: Yes   Status: Acute


Comment: nsr in 80-90s


cardio on board and pt is w/o further episodes of VT


no cardio interventions planned at present.








(2) DVT prophylaxis


Current Visit: Yes   Status: Acute


Comment: lovenox








(3) Scrotal edema


Current Visit: Yes   Status: Acute


Comment: scrotal us-noted


urology consult-dr cortes to see pt when stable








(4) History of seizure


Current Visit: Yes   Status: Acute


Comment: neuro consult


?? eeg


started on keppra








(5) Diabetes mellitus with hyperglycemia


Current Visit: Yes   Status: Acute


Comment: riss per protocol








(6) Agitation


Current Visit: Yes   Status: Acute


Comment: remians on propofol for agitation








(7) UTI (urinary tract infection)


Current Visit: Yes   Status: Acute


Comment: cont anbx


mcgee cath


uro consult pending








(8) Aspiration pneumonia


Current Visit: Yes   Status: Acute


Comment: cipro


cont IV Cefapime


BD NEBS Q 6H PRN


f/u repeat c/s











- Assessment and Plan (Free Text)


Assessment: 


remains pending guardian

## 2015-12-22 NOTE — RAD
HISTORY:

vented  



COMPARISON:

12/21/2015 



FINDINGS:



LUNGS:

Persistent extensive right basilar/perihilar opacity without 

significant interval change.  Pulmonary vascular congestive changes 

noted.



PLEURA:

Small left pleural effusion.  Possible very small right pleural 

effusion.  No pneumothorax.



CARDIOVASCULAR:

Normal heart size.  ET tube, NG tube and right PICC catheter are 

unchanged from prior examination.



OSSEOUS STRUCTURES:

Severe left glenohumeral osteoarthritis.  Moderate right glenohumeral 

osteoarthritis.



VISUALIZED UPPER ABDOMEN:

Normal.



OTHER FINDINGS:

None.



IMPRESSION:

Persistent bibasilar/perihilar opacity with congestive change and 

pleural effusion. Possible pulmonary edema. No significant interval 

change. Lines and tubes unchanged.

## 2015-12-23 LAB
ALBUMIN SERPL-MCNC: 3.2 G/DL (ref 3.5–5)
ALBUMIN/GLOB SERPL: 0.8 {RATIO} (ref 1–2.1)
ALT SERPL-CCNC: 28 U/L (ref 21–72)
ARTERIAL BLOOD GAS HEMOGLOBIN: 10.8 G/DL (ref 11.7–17.4)
ARTERIAL BLOOD GAS O2 SAT: 97.5 % (ref 95–98)
ARTERIAL BLOOD GAS PCO2: 41 MM/HG (ref 35–45)
ARTERIAL BLOOD GAS TCO2: 27.9 MMOL/L (ref 22–28)
ARTERIAL PATENCY WRIST A: YES
AST SERPL-CCNC: 41 U/L (ref 17–59)
BASOPHILS # BLD AUTO: 0.1 K/UL (ref 0–0.2)
BASOPHILS NFR BLD: 0.4 % (ref 0–2)
BUN SERPL-MCNC: 15 MG/DL (ref 9–20)
CALCIUM SERPL-MCNC: 9.1 MG/DL (ref 8.4–10.2)
EOSINOPHIL # BLD AUTO: 0.4 K/UL (ref 0–0.7)
EOSINOPHIL NFR BLD: 2.9 % (ref 0–4)
ERYTHROCYTE [DISTWIDTH] IN BLOOD BY AUTOMATED COUNT: 13.4 % (ref 11.5–14.5)
GFR NON-AFRICAN AMERICAN: > 60
HCO3 BLDA-SCNC: 26.4 MMOL/L (ref 21–28)
HGB BLD-MCNC: 10 G/DL (ref 12–18)
HYPOCHROMIC: SLIGHT
INHALED O2 CONCENTRATION: 50 %
LYMPHOCYTE: 12 % (ref 20–50)
LYMPHOCYTES # BLD AUTO: 1.1 K/UL (ref 1–4.3)
LYMPHOCYTES NFR BLD AUTO: 7.2 % (ref 20–40)
MACROCYTES BLD QL SMEAR: SLIGHT
MCH RBC QN AUTO: 32.5 PG (ref 27–31)
MCHC RBC AUTO-ENTMCNC: 31.3 G/DL (ref 33–37)
MCV RBC AUTO: 103.8 FL (ref 80–94)
MONOCYTE: 4 % (ref 0–10)
MONOCYTES # BLD: 0.9 K/UL (ref 0–0.8)
MONOCYTES NFR BLD: 6.1 % (ref 0–10)
NEUTROPHILS # BLD: 12.9 K/UL (ref 1.8–7)
NEUTROPHILS NFR BLD AUTO: 83.4 % (ref 50–75)
NEUTROPHILS NFR BLD AUTO: 84 % (ref 42–75)
NRBC BLD AUTO-RTO: 0 % (ref 0–0)
O2 CAP BLDA-SCNC: 15 ML/DL (ref 16–24)
O2 CT BLDA-SCNC: 14.6 ML/DL (ref 15–23)
PH BLDA: 7.42 [PH] (ref 7.35–7.45)
PLATELET # BLD EST: (no result) 10*3/UL
PLATELET # BLD: 555 K/UL (ref 130–400)
PMV BLD AUTO: 7.8 FL (ref 7.2–11.7)
PO2 BLDA: 88 MM/HG (ref 80–100)
RBC # BLD AUTO: 3.07 MIL/UL (ref 4.4–5.9)
TEARDROP CELLS: SLIGHT
TOTAL CELLS COUNTED BLD: 100
TOXIC GRANULES BLD QL SMEAR: PRESENT
WBC # BLD AUTO: 15.4 K/UL (ref 4.8–10.8)

## 2015-12-23 RX ADMIN — IPRATROPIUM BROMIDE AND ALBUTEROL SULFATE SCH ML: .5; 3 SOLUTION RESPIRATORY (INHALATION) at 19:26

## 2015-12-23 RX ADMIN — NITROGLYCERIN SCH IN: 20 OINTMENT TOPICAL at 16:10

## 2015-12-23 RX ADMIN — Medication SCH ML: at 08:16

## 2015-12-23 RX ADMIN — IPRATROPIUM BROMIDE AND ALBUTEROL SULFATE SCH ML: .5; 3 SOLUTION RESPIRATORY (INHALATION) at 14:23

## 2015-12-23 RX ADMIN — LEVETIRACETAM SCH MG: 100 SOLUTION ORAL at 16:11

## 2015-12-23 RX ADMIN — IPRATROPIUM BROMIDE AND ALBUTEROL SULFATE SCH ML: .5; 3 SOLUTION RESPIRATORY (INHALATION) at 01:32

## 2015-12-23 RX ADMIN — NITROGLYCERIN SCH IN: 20 OINTMENT TOPICAL at 04:00

## 2015-12-23 RX ADMIN — NITROGLYCERIN SCH IN: 20 OINTMENT TOPICAL at 09:37

## 2015-12-23 RX ADMIN — LEVETIRACETAM SCH MG: 100 SOLUTION ORAL at 08:15

## 2015-12-23 RX ADMIN — ENOXAPARIN SODIUM SCH MG: 40 INJECTION SUBCUTANEOUS at 11:24

## 2015-12-23 RX ADMIN — IPRATROPIUM BROMIDE AND ALBUTEROL SULFATE SCH ML: .5; 3 SOLUTION RESPIRATORY (INHALATION) at 07:42

## 2015-12-23 RX ADMIN — NITROGLYCERIN SCH IN: 20 OINTMENT TOPICAL at 21:23

## 2015-12-23 NOTE — CP.PCM.PN
Subjective





- Subjective


Subjective: 


pt remains as assessed, still opens eyes to assessment.  remains on propofol. 

vs stable.  


new growth of psudomonas in trachial aspirate.








Review of Systems





- Review of Systems


Systems not reviewed;Unavailable: Intubated





- Constitutional


Constitutional: UN





- EENT


Eyes: UNREMARKABLE


Ears: UNREMARKABLE


Nose/Mouth/Throat: UNREMARKABLE





- Cardiovascular


Cardiovascular: UNREMARKABLE





- Respiratory


Respiratory: As Per HPI, Cough, Chest Congestion





- Gastrointestinal


Gastrointestinal: UNREMARKABLE





- Genitourinary


Genitourinary: UNREMARKABLE





- Reproductive: Male


Reproductive:Male: UNREMARKABLE





- Musculoskeletal


Musculoskeletal: UNREMARKABLE





- Integumentary


Integumentary: UNREMARKABLE





- Neurological


Neurological: UNREMARKABLE





- Psychiatric


Psychiatric: UNREMARKABLE





- Endocrine


Endocrine: UNREMARKABLE





- Hematologic/Lymphatic


Hematologic: UNREMARKABLE





Objective





- Vital Signs/Intake and Output


Vital Signs (last 24 hours): 


 Vital Signs - 24 hr











  12/22/15 12/22/15 12/22/15





  09:35 10:00 11:00


 


Temperature   


 


Pulse Rate 88 100 H 90


 


Respiratory   16





Rate   


 


Blood Pressure 115/73 142/100 H 112/68


 


O2 Sat by Pulse  99 99





Oximetry   














  12/22/15 12/22/15 12/22/15





  12:00 16:00 18:00


 


Temperature 98.9 F 98.9 F 


 


Pulse Rate 94 H 88 108 H


 


Respiratory 16 16 16





Rate   


 


Blood Pressure 130/84 147/95 H 160/100 H


 


O2 Sat by Pulse 100 100 100





Oximetry   














  12/22/15 12/22/15 12/22/15





  19:00 20:00 20:22


 


Temperature  100.2 F H 


 


Pulse Rate 114 H 115 H 115 H


 


Respiratory 18 17 





Rate   


 


Blood Pressure 171/112 H 170/105 H 171/108 H


 


O2 Sat by Pulse 100 100 





Oximetry   














  12/22/15 12/22/15 12/22/15





  21:00 22:00 23:00


 


Temperature   


 


Pulse Rate 108 H 102 H 103 H


 


Respiratory 18 19 18





Rate   


 


Blood Pressure 152/105 H 134/88 144/93 H


 


O2 Sat by Pulse 100 99 100





Oximetry   














  12/23/15 12/23/15 12/23/15





  00:00 01:00 02:00


 


Temperature 98.9 F  


 


Pulse Rate 103 H 103 H 118 H


 


Respiratory 20 21 20





Rate   


 


Blood Pressure 138/89 134/56 L 161/101 H


 


O2 Sat by Pulse 98 99 100





Oximetry   














  12/23/15 12/23/15 12/23/15





  03:00 04:00 05:00


 


Temperature  98.7 F 


 


Pulse Rate 107 H 107 H 99 H


 


Respiratory 17 17 21





Rate   


 


Blood Pressure 102/72 98/64 L 117/89


 


O2 Sat by Pulse 99 99 98





Oximetry   














  12/23/15 12/23/15





  06:00 07:00


 


Temperature  


 


Pulse Rate 91 H 92 H


 


Respiratory 18 20





Rate  


 


Blood Pressure 100/67 106/66


 


O2 Sat by Pulse 100 100





Oximetry  











Intake and Output (last 12 hours): 


 Intake & Output











 12/22/15 12/23/15 12/23/15





 18:59 06:59 18:59


 


Intake Total 651 1320 240


 


Output Total 2205 800 


 


Balance -1554 520 240


 


Intake:   


 


  IV 31 200 20


 


  Intake, Piggyback  200 


 


  Oral  240 


 


  Tube Feeding 620 580 120


 


  Free Water Flush  100 100


 


Output:   


 


  Gastric Amount 5  


 


    Stomach 5  


 


  Urine 2200 800 


 


    Urethral (Mcgee) 2200 800 


 


Other:   


 


  # Bowel Movements 0  














- Medications


Medications: 


 Current Medications





Acetaminophen (Tylenol 650mg/20.3ml Solution Ud)  650 mg PO Q6 PRN


   PRN Reason: Fever


   Last Admin: 12/18/15 02:28 Dose:  650 mg


Acetaminophen (Tylenol 650 Mg Supp)  650 mg [place] Q6 PRN


   PRN Reason: fevers


   Last Admin: 12/14/15 06:53 Dose:  650 mg


Albuterol/Ipratropium (Duoneb 3 Mg/0.5 Mg (3 Ml) Ud)  3 ml INH RQ6 [facility]


   Last Admin: 12/23/15 07:42 Dose:  3 ml


Aspirin (Ecotrin)  81 mg PO DAILY [facility]


   Last Admin: 12/23/15 08:15 Dose:  81 mg


Enalapril Maleate (Vasotec)  10 mg PO DAILY [facility]


   Last Admin: 12/23/15 08:15 Dose:  10 mg


Famotidine (Pepcid)  20 mg GT BID [facility]


   Last Admin: 12/23/15 08:15 Dose:  20 mg


Cefepime HCl 2 gm/ Sodium (Chloride)  100 mls @ 100 mls/hr IVPB Q12 [facility]


   Last Admin: 12/23/15 08:15 Dose:  100 mls/hr


Insulin Human Regular (Humulin R)  0 units SC ACHS [facility]


   PRN Reason: Protocol


   Last Admin: 12/23/15 06:53 Dose:  Not Given


Levetiracetam (Keppra)  500 mg PO BID [facility]


   Last Admin: 12/23/15 08:15 Dose:  500 mg


Metoprolol Tartrate (Lopressor)  25 mg PO Q12 [facility]


   Last Admin: 12/23/15 08:15 Dose:  25 mg


Multivitamins/Vitamin C (Multi-Delyn Liquid)  5 ml PO DAILY [facility]


   Last Admin: 12/23/15 08:16 Dose:  5 ml


Nitroglycerin (Nitro-Bid 2% Oint)  2 in TOP Q6 [facility]


   Last Admin: 12/23/15 04:00 Dose:  2 in











- Labs


Labs (last 24 hours): 


 Laboratory Results - last 24 hr











  12/22/15 12/23/15 12/23/15





  23:18 05:16 05:26


 


WBC   


 


RBC   


 


Hgb   


 


Hct   


 


MCV   


 


MCH   


 


MCHC   


 


RDW   


 


Plt Count   


 


MPV   


 


Neut % (Auto)   


 


Lymph % (Auto)   


 


Mono % (Auto)   


 


Eos % (Auto)   


 


Baso % (Auto)   


 


Neut #   


 


Lymph #   


 


Mono #   


 


Eos #   


 


Baso #   


 


pCO2   41 


 


pO2   88 


 


HCO3   26.4 


 


ABG pH   7.42 


 


ABG Total CO2   27.9 


 


ABG O2 Saturation   97.5 


 


ABG O2 Content   14.6 L 


 


ABG Base Excess   1.9 


 


ABG Hemoglobin   10.8 L 


 


ABG Carboxyhemoglobin   1.3 


 


POC ABG HHb (Measured)   2.4 


 


ABG Methemoglobin   0.8 


 


ABG O2 Capacity   15.0 L 


 


Chu Test   Yes 


 


A-a O2 Difference   217.0 


 


Hgb O2 Saturation   95.5 


 


FiO2   50.0 


 


Sodium   


 


Potassium   


 


Chloride   


 


Carbon Dioxide   


 


Anion Gap   


 


BUN   


 


Creatinine   


 


Est GFR ( Amer)   


 


Est GFR (Non-Af Amer)   


 


POC Glucose (mg/dL)  93   115 H


 


Random Glucose   


 


Calcium   


 


Total Bilirubin   


 


AST   


 


ALT   


 


Alkaline Phosphatase   


 


Total Protein   


 


Albumin   


 


Globulin   


 


Albumin/Globulin Ratio   














  12/23/15





  06:00


 


WBC  15.4 H


 


RBC  3.07 L


 


Hgb  10.0 L


 


Hct  31.8 L


 


MCV  103.8 H


 


MCH  32.5 H


 


MCHC  31.3 L


 


RDW  13.4


 


Plt Count  555 H


 


MPV  7.8


 


Neut % (Auto)  83.4 H


 


Lymph % (Auto)  7.2 L


 


Mono % (Auto)  6.1


 


Eos % (Auto)  2.9


 


Baso % (Auto)  0.4


 


Neut #  12.9 H


 


Lymph #  1.1


 


Mono #  0.9 H


 


Eos #  0.4


 


Baso #  0.1


 


pCO2 


 


pO2 


 


HCO3 


 


ABG pH 


 


ABG Total CO2 


 


ABG O2 Saturation 


 


ABG O2 Content 


 


ABG Base Excess 


 


ABG Hemoglobin 


 


ABG Carboxyhemoglobin 


 


POC ABG HHb (Measured) 


 


ABG Methemoglobin 


 


ABG O2 Capacity 


 


Chu Test 


 


A-a O2 Difference 


 


Hgb O2 Saturation 


 


FiO2 


 


Sodium  144


 


Potassium  3.8


 


Chloride  109 H


 


Carbon Dioxide  26


 


Anion Gap  13


 


BUN  15


 


Creatinine  1.0


 


Est GFR ( Amer)  > 60


 


Est GFR (Non-Af Amer)  > 60


 


POC Glucose (mg/dL) 


 


Random Glucose  111 H


 


Calcium  9.1


 


Total Bilirubin  0.4


 


AST  41


 


ALT  28


 


Alkaline Phosphatase  133 H


 


Total Protein  7.3


 


Albumin  3.2 L


 


Globulin  4.1 H


 


Albumin/Globulin Ratio  0.8 L














- Constitutional


Appears: Non-toxic, No Acute Distress, Chronically Ill





- Head Exam


Head Exam: ATRAUMATIC, NORMAL INSPECTION, NORMOCEPHALIC





- Eye Exam


Eye Exam: EOMI





- Respiratory Exam


Respiratory Exam: Clear to PA & Lateral, NORMAL BREATHING PATTERN, UNREMARKABLE





- Cardiovascular Exam


Cardiovascular Exam: REGULAR RHYTHM, RRR





- GI/Abdominal Exam


GI & Abdominal Exam: Normal Bowel Sounds, Soft, Unremarkable





- Extremities Exam


Extremities exam: full ROM, normal capillary refill, normal inspection, pedal 

pulses present





- Back Exam


Back exam: FULL ROM





- Neurological Exam


Additional comments: 


opens eyes to command, otherwise sedated





- Skin


Skin Exam: Dry, Intact, Normal Color, Warm





Assessment/Plan


(1) Cardiac arrest


Current Visit: Yes   Status: Acute


Comment: nsr in 80-90s


cardio on board and pt is w/o further episodes of VT


no cardio interventions planned at present.








(2) DVT prophylaxis


Current Visit: Yes   Status: Acute


Comment: lovenox








(3) Scrotal edema


Current Visit: Yes   Status: Acute


Comment: scrotal us-noted


urology consult-dr cortes to see pt when stable








(4) History of seizure


Current Visit: Yes   Status: Acute


Comment: neuro consult


?? eeg


started on keppra








(5) Diabetes mellitus with hyperglycemia


Current Visit: Yes   Status: Acute


Comment: riss per protocol








(6) Agitation


Current Visit: Yes   Status: Acute


Comment: remians on propofol for agitation








(7) UTI (urinary tract infection)


Current Visit: Yes   Status: Acute


Comment: cont anbx


mcgee cath


uro consult pending








(8) Aspiration pneumonia


Current Visit: Yes   Status: Acute


Comment: cipro


cont IV Cefapime


BD NEBS Q 6H PRN


f/u repeat c/s


psudomonas in trach asp


likely will need trach placement for optimal care











- Assessment and Plan (Free Text)


Assessment: 


pending guardianship


will likely need trach placement

## 2015-12-23 NOTE — CP.PCM.PN
Subjective





- Subjective


Subjective: 


INTERIM EVENTS NOTED


S/P V TACH


REMAINS INTUBATED


GRADUAL BUT SLIGHT IMPROVEMENT IN MENTITION


IV RX IN PROGRESS


PROGNOSIS REMAINS GUARDED 





Review of Systems





- Review of Systems


All systems: reviewed and no additional remarkable complaints except





- Constitutional


Constitutional: absent: Fever





Objective





- Vital Signs/Intake and Output


Vital Signs (last 24 hours): 


 Vital Signs - 24 hr











  12/22/15 12/22/15 12/22/15





  16:00 18:00 19:00


 


Temperature 98.9 F  


 


Pulse Rate 88 108 H 114 H


 


Respiratory 16 16 18





Rate   


 


Blood Pressure 147/95 H 160/100 H 171/112 H


 


O2 Sat by Pulse 100 100 100





Oximetry   














  12/22/15 12/22/15 12/22/15





  20:00 20:22 21:00


 


Temperature 100.2 F H  


 


Pulse Rate 115 H 115 H 108 H


 


Respiratory 17  18





Rate   


 


Blood Pressure 170/105 H 171/108 H 152/105 H


 


O2 Sat by Pulse 100  100





Oximetry   














  12/22/15 12/22/15 12/23/15





  22:00 23:00 00:00


 


Temperature   98.9 F


 


Pulse Rate 102 H 103 H 103 H


 


Respiratory 19 18 20





Rate   


 


Blood Pressure 134/88 144/93 H 138/89


 


O2 Sat by Pulse 99 100 98





Oximetry   














  12/23/15 12/23/15 12/23/15





  01:00 02:00 03:00


 


Temperature   


 


Pulse Rate 103 H 118 H 107 H


 


Respiratory 21 20 17





Rate   


 


Blood Pressure 134/56 L 161/101 H 102/72


 


O2 Sat by Pulse 99 100 99





Oximetry   














  12/23/15 12/23/15 12/23/15





  04:00 05:00 06:00


 


Temperature 98.7 F  


 


Pulse Rate 107 H 99 H 91 H


 


Respiratory 17 21 18





Rate   


 


Blood Pressure 98/64 L 117/89 100/67


 


O2 Sat by Pulse 99 98 100





Oximetry   














  12/23/15 12/23/15 12/23/15





  07:00 08:00 08:56


 


Temperature  98.2 F 


 


Pulse Rate 92 H 109 H 169 H


 


Respiratory 20 21 





Rate   


 


Blood Pressure 106/66 128/91 H 


 


O2 Sat by Pulse 100 97 





Oximetry   














  12/23/15 12/23/15 12/23/15





  08:57 10:00 12:00


 


Temperature   99.4 F


 


Pulse Rate 118 H 101 H 98 H


 


Respiratory 18 22 10 L





Rate   


 


Blood Pressure 129/91 H 126/74 112/73


 


O2 Sat by Pulse 98 100 100





Oximetry   











Intake and Output (last 12 hours): 


 Intake & Output











 12/22/15 12/23/15 12/23/15





 18:59 06:59 18:59


 


Intake Total 651 1320 790


 


Output Total 2205 800 


 


Balance -1554 520 790


 


Intake:   


 


  IV 31 200 30


 


  Intake, Piggyback  200 100


 


  Oral  240 


 


  Tube Feeding 620 580 360


 


  Free Water Flush  100 300


 


Output:   


 


  Gastric Amount 5  


 


    Stomach 5  


 


  Urine 2200 800 


 


    Urethral ([place]) 2200 800 


 


Other:   


 


  # Bowel Movements 0  1














- Medications


Medications: 


 Current Medications





Acetaminophen (Tylenol 650mg/20.3ml Solution Ud)  650 mg PO Q6 PRN


   PRN Reason: Fever


   Last Admin: 12/18/15 02:28 Dose:  650 mg


Acetaminophen (Tylenol 650 Mg Supp)  650 mg [place] Q6 PRN


   PRN Reason: fevers


   Last Admin: 12/14/15 06:53 Dose:  650 mg


Albuterol/Ipratropium (Duoneb 3 Mg/0.5 Mg (3 Ml) Ud)  3 ml INH RQ6 [facility]


   Last Admin: 12/23/15 07:42 Dose:  3 ml


Aspirin (Ecotrin)  81 mg PO DAILY [facility]


   Last Admin: 12/23/15 08:15 Dose:  81 mg


Enalapril Maleate (Vasotec)  10 mg PO DAILY [facility]


   Last Admin: 12/23/15 08:15 Dose:  10 mg


Enoxaparin Sodium (Lovenox)  40 mg SC DAILY [facility]


   Last Admin: 12/23/15 11:24 Dose:  40 mg


Famotidine (Pepcid)  20 mg GT BID [facility]


   Last Admin: 12/23/15 08:15 Dose:  20 mg


Cefepime HCl 2 gm/ Sodium (Chloride)  100 mls @ 100 mls/hr IVPB Q12 [facility]


   Last Admin: 12/23/15 08:15 Dose:  100 mls/hr


Insulin Human Regular (Humulin R)  0 units SC ACHS [facility]


   PRN Reason: Protocol


   Last Admin: 12/23/15 11:27 Dose:  Not Given


Levetiracetam (Keppra)  500 mg PO BID [facility]


   Last Admin: 12/23/15 08:15 Dose:  500 mg


Metoprolol Tartrate (Lopressor)  50 mg PO Q12 [facility]


   Last Admin: 12/23/15 09:36 Dose:  50 mg


Multivitamins/Vitamin C (Multi-Delyn Liquid)  5 ml PO DAILY [facility]


   Last Admin: 12/23/15 08:16 Dose:  5 ml


Nitroglycerin (Nitro-Bid 2% Oint)  2 in TOP Q6 [facility]


   Last Admin: 12/23/15 09:37 Dose:  2 in











- Labs


Labs (last 24 hours): 


 Laboratory Results - last 24 hr











  12/22/15 12/23/15 12/23/15





  23:18 05:16 05:26


 


WBC   


 


RBC   


 


Hgb   


 


Hct   


 


MCV   


 


MCH   


 


MCHC   


 


RDW   


 


Plt Count   


 


MPV   


 


Neut % (Auto)   


 


Lymph % (Auto)   


 


Mono % (Auto)   


 


Eos % (Auto)   


 


Baso % (Auto)   


 


Neut #   


 


Lymph #   


 


Mono #   


 


Eos #   


 


Baso #   


 


Neutrophils % (Manual)   


 


Lymphocytes % (Manual)   


 


Monocytes % (Manual)   


 


Toxic Granulation   


 


Platelet Estimate   


 


Hypochromasia (manual)   


 


Macrocytosis (manual)   


 


Tear Drop Cells   


 


pCO2   41 


 


pO2   88 


 


HCO3   26.4 


 


ABG pH   7.42 


 


ABG Total CO2   27.9 


 


ABG O2 Saturation   97.5 


 


ABG O2 Content   14.6 L 


 


ABG Base Excess   1.9 


 


ABG Hemoglobin   10.8 L 


 


ABG Carboxyhemoglobin   1.3 


 


POC ABG HHb (Measured)   2.4 


 


ABG Methemoglobin   0.8 


 


ABG O2 Capacity   15.0 L 


 


Chu Test   Yes 


 


A-a O2 Difference   217.0 


 


Hgb O2 Saturation   95.5 


 


FiO2   50.0 


 


Sodium   


 


Potassium   


 


Chloride   


 


Carbon Dioxide   


 


Anion Gap   


 


BUN   


 


Creatinine   


 


Est GFR ( Amer)   


 


Est GFR (Non-Af Amer)   


 


POC Glucose (mg/dL)  93   115 H


 


Random Glucose   


 


Calcium   


 


Total Bilirubin   


 


AST   


 


ALT   


 


Alkaline Phosphatase   


 


Total Protein   


 


Albumin   


 


Globulin   


 


Albumin/Globulin Ratio   














  12/23/15 12/23/15





  06:00 11:27


 


WBC  15.4 H 


 


RBC  3.07 L 


 


Hgb  10.0 L 


 


Hct  31.8 L 


 


MCV  103.8 H 


 


MCH  32.5 H 


 


MCHC  31.3 L 


 


RDW  13.4 


 


Plt Count  555 H 


 


MPV  7.8 


 


Neut % (Auto)  83.4 H 


 


Lymph % (Auto)  7.2 L 


 


Mono % (Auto)  6.1 


 


Eos % (Auto)  2.9 


 


Baso % (Auto)  0.4 


 


Neut #  12.9 H 


 


Lymph #  1.1 


 


Mono #  0.9 H 


 


Eos #  0.4 


 


Baso #  0.1 


 


Neutrophils % (Manual)  84 H 


 


Lymphocytes % (Manual)  12 L 


 


Monocytes % (Manual)  4 


 


Toxic Granulation  Present 


 


Platelet Estimate  Increased H 


 


Hypochromasia (manual)  Slight 


 


Macrocytosis (manual)  Slight 


 


Tear Drop Cells  Slight 


 


pCO2  


 


pO2  


 


HCO3  


 


ABG pH  


 


ABG Total CO2  


 


ABG O2 Saturation  


 


ABG O2 Content  


 


ABG Base Excess  


 


ABG Hemoglobin  


 


ABG Carboxyhemoglobin  


 


POC ABG HHb (Measured)  


 


ABG Methemoglobin  


 


ABG O2 Capacity  


 


Chu Test  


 


A-a O2 Difference  


 


Hgb O2 Saturation  


 


FiO2  


 


Sodium  144 


 


Potassium  3.8 


 


Chloride  109 H 


 


Carbon Dioxide  26 


 


Anion Gap  13 


 


BUN  15 


 


Creatinine  1.0 


 


Est GFR ( Amer)  > 60 


 


Est GFR (Non-Af Amer)  > 60 


 


POC Glucose (mg/dL)   100


 


Random Glucose  111 H 


 


Calcium  9.1 


 


Total Bilirubin  0.4 


 


AST  41 


 


ALT  28 


 


Alkaline Phosphatase  133 H 


 


Total Protein  7.3 


 


Albumin  3.2 L 


 


Globulin  4.1 H 


 


Albumin/Globulin Ratio  0.8 L 














- Constitutional


Appears: Non-toxic, Chronically Ill





- Head Exam


Head Exam: NORMOCEPHALIC





- Eye Exam


Eye Exam: absent: Scleral icterus





- ENT Exam


ENT Exam: Mucous Membranes Dry, Normal External Ear Exam





- Neck Exam


Neck exam: absent: Lymphadenopathy, Thyromegaly





- Respiratory Exam


Respiratory Exam: Decreased Breath Sounds, Rhonchi





- Cardiovascular Exam


Cardiovascular Exam: REGULAR RHYTHM, +S1, +S2





- GI/Abdominal Exam


GI & Abdominal Exam: Diminished Bowel Sounds, Soft.  absent: Tenderness





- Rectal Exam


Rectal Exam: Deferred





-  Exam


 Exam: NORMAL INSPECTION





- Extremities Exam


Extremities exam: pedal pulses present.  absent: pedal edema, tenderness





- Back Exam


Back exam: absent: CVA tenderness (L), CVA tenderness (R)





- Neurological Exam


Neurological exam: Alert, Altered, CN II-XII Intact





Assessment/Plan


(1) Agitation


Current Visit: Yes   Status: Acute


Comment: remians on propofol for agitation








(2) Aspiration pneumonia


Current Visit: Yes   Status: Acute


Comment: cipro


cont IV Cefapime


BD NEBS Q 6H PRN


f/u repeat c/s


psudomonas in trach asp


likely will need trach placement for optimal care








(3) Cardiac arrest


Current Visit: Yes   Status: Acute


Comment: nsr in 80-90s


cardio on board and pt is w/o further episodes of VT


no cardio interventions planned at present.








(4) Diabetes mellitus with hyperglycemia


Current Visit: Yes   Status: Acute


Comment: riss per protocol








(5) History of seizure


Current Visit: Yes   Status: Acute


Comment: neuro consult


?? eeg


started on keppra








(6) NSTEMI (non-ST elevated myocardial infarction)


Current Visit: Yes   Status: Acute

## 2015-12-23 NOTE — CP.CCUPN
CCU Subjective





- Physician Review


Subjective (Free Text): 


***INTENSIVIST PROGRESS NOTE***


Patient examined, interim events reviewed:





Developed sudden onset SVT up to 170/min, irregular rate, and manifest as rapid 

A Fib on monitor, given Lopressor 5 mg IVP and reverted to Sinus tachy at 110 

with . Then, spontaneously opened eyes and stayed awake while receiving 

40 mcg/min Propofol, following simple commands by turning head and raising both 

arms. Exhibited head turning in no  direction when informed of events leading 

to hospitalization, responded with yes nodding when questioned if he 

understood English.  Placed on CPAP PS with good initial tolerance, suctioned 

for copious secretions.  No recurrent seizure activity noted. T 100.2F max 

overnight, 129/91, 110, 22, 100% on 50% oxygen, Breathing 16 on AC 16., PPP up 

to 32. 24H I/Os = 1971/3005 ml.








EXAM-


HEENT: no icterus, no nystagmus, pupils 2-3 mm and reactive,  no gaze preference

,  no nystagmus


NECK: no visible JVD, supple, carotids equal upstroke bilat/no bruits


CHEST: decreased BS bases, no wheezes audible


HEART:  regular, distant, S1S2, no murmur audible, no rubs.


ABD:  soft, no increased distention, no focal tenderness,  no HSM. BS hypoactive

, 


EXT:    +UE bilat edema decreasing, no peripheral/ digital cyanosis, no 

palpable cords, distal pulses intact and symmetrical, SCDs on bilat. 


NEURO:  plantars no response. Otherwise flaccid legs. 


SKIN:   no rashes





LABS


WBC= 15.4


HGB= 10.0


PLTs = 555K


Na= 144


K=  3.8


HCO3= 26


BUN/Cr=  15/1.0


BS= 111


7.42/41/88


CXR: none ordered for today.





Assessment: 


1.   Cardiac Arrest with resuscitation from VT. 


2.   Post-Resuscitation Therapeutic Hypothermia-completed


3.   Anoxic Encephalopathy


4.     Aspiration Pneumonia


5.    H/o Seizure Disorder with recurrence








PLAN:


1.   Day # 16  on MV support, more periods of wakefulness noted. But still does 

not demonstrate the ability to self-protect airway ( in order at least to be 

considered for extubation) nor does he appear to fully comprehend the nature 

and scope of his current illness. He may still need trach and he is mentally 

incompetent and cannot provide consent for himself. Sisters have not been 

contacted or have not appeared yet. Attainment of Guardianship should proceed 

as feasible. 


2.   In my opinion, Trach would be a life-saving and necessary procedure in 

this case, for the potential instance if patient ever self-extubates and we are 

unable to re-intubate in a timely manner, further anoxic injury or death may 

occur.  


3.   TLC Right subclavian vein placed almost 1 week ago. May need replacement 

if failure to yield blood upon attempts to withdraw via all ports.


4.   Cefepime sensitivity noted on present Kleb and Psuedomonas isolates in 

sputum. 


5.   Lopressor increased to 50 mg Q12H to prevent further episodes of PSVT. 


6.   Needs PT eval for bedside PROM.

## 2015-12-24 LAB
ALBUMIN SERPL-MCNC: 3.4 G/DL (ref 3.5–5)
ALBUMIN/GLOB SERPL: 0.8 {RATIO} (ref 1–2.1)
ALT SERPL-CCNC: 25 U/L (ref 21–72)
ARTERIAL BLOOD GAS HEMOGLOBIN: 10.1 G/DL (ref 11.7–17.4)
ARTERIAL BLOOD GAS O2 SAT: 100.7 % (ref 95–98)
ARTERIAL BLOOD GAS PCO2: 42 MM/HG (ref 35–45)
ARTERIAL BLOOD GAS TCO2: 29.2 MMOL/L (ref 22–28)
ARTERIAL PATENCY WRIST A: YES
AST SERPL-CCNC: 76 U/L (ref 17–59)
BASOPHILS # BLD AUTO: 0.1 K/UL (ref 0–0.2)
BASOPHILS NFR BLD: 0.8 % (ref 0–2)
BUN SERPL-MCNC: 19 MG/DL (ref 9–20)
CALCIUM SERPL-MCNC: 9.2 MG/DL (ref 8.4–10.2)
EOSINOPHIL # BLD AUTO: 0.6 K/UL (ref 0–0.7)
EOSINOPHIL NFR BLD: 4 % (ref 0–4)
ERYTHROCYTE [DISTWIDTH] IN BLOOD BY AUTOMATED COUNT: 13.7 % (ref 11.5–14.5)
GFR NON-AFRICAN AMERICAN: > 60
HCO3 BLDA-SCNC: 27.4 MMOL/L (ref 21–28)
HGB BLD-MCNC: 9.9 G/DL (ref 12–18)
INHALED O2 CONCENTRATION: 50 %
LYMPHOCYTES # BLD AUTO: 1.4 K/UL (ref 1–4.3)
LYMPHOCYTES NFR BLD AUTO: 9.9 % (ref 20–40)
MCH RBC QN AUTO: 31.8 PG (ref 27–31)
MCHC RBC AUTO-ENTMCNC: 30.8 G/DL (ref 33–37)
MCV RBC AUTO: 103.2 FL (ref 80–94)
MONOCYTES # BLD: 0.9 K/UL (ref 0–0.8)
MONOCYTES NFR BLD: 6.8 % (ref 0–10)
NEUTROPHILS # BLD: 11 K/UL (ref 1.8–7)
NEUTROPHILS NFR BLD AUTO: 78.5 % (ref 50–75)
NRBC BLD AUTO-RTO: 0 % (ref 0–0)
O2 CAP BLDA-SCNC: 14.1 ML/DL (ref 16–24)
O2 CT BLDA-SCNC: 14.2 ML/DL (ref 15–23)
PH BLDA: 7.43 [PH] (ref 7.35–7.45)
PLATELET # BLD: 590 K/UL (ref 130–400)
PMV BLD AUTO: 7.5 FL (ref 7.2–11.7)
PO2 BLDA: 171 MM/HG (ref 80–100)
RBC # BLD AUTO: 3.12 MIL/UL (ref 4.4–5.9)
WBC # BLD AUTO: 14 K/UL (ref 4.8–10.8)

## 2015-12-24 RX ADMIN — ENOXAPARIN SODIUM SCH MG: 40 INJECTION SUBCUTANEOUS at 08:41

## 2015-12-24 RX ADMIN — IPRATROPIUM BROMIDE AND ALBUTEROL SULFATE SCH ML: .5; 3 SOLUTION RESPIRATORY (INHALATION) at 14:16

## 2015-12-24 RX ADMIN — Medication SCH ML: at 08:48

## 2015-12-24 RX ADMIN — IPRATROPIUM BROMIDE AND ALBUTEROL SULFATE SCH ML: .5; 3 SOLUTION RESPIRATORY (INHALATION) at 08:10

## 2015-12-24 RX ADMIN — NITROGLYCERIN SCH IN: 20 OINTMENT TOPICAL at 17:28

## 2015-12-24 RX ADMIN — IPRATROPIUM BROMIDE AND ALBUTEROL SULFATE SCH ML: .5; 3 SOLUTION RESPIRATORY (INHALATION) at 19:19

## 2015-12-24 RX ADMIN — IPRATROPIUM BROMIDE AND ALBUTEROL SULFATE SCH ML: .5; 3 SOLUTION RESPIRATORY (INHALATION) at 01:01

## 2015-12-24 RX ADMIN — NITROGLYCERIN SCH IN: 20 OINTMENT TOPICAL at 09:06

## 2015-12-24 RX ADMIN — LEVETIRACETAM SCH MG: 100 SOLUTION ORAL at 08:40

## 2015-12-24 RX ADMIN — NITROGLYCERIN SCH IN: 20 OINTMENT TOPICAL at 22:00

## 2015-12-24 RX ADMIN — LEVETIRACETAM SCH MG: 100 SOLUTION ORAL at 17:27

## 2015-12-24 NOTE — CP.PCM.PN
Subjective





- Subjective


Subjective: 


grew psweudomonas in sputum


rx in progress


needs trach 





Objective





- Vital Signs/Intake and Output


Vital Signs (last 24 hours): 


 Vital Signs - 24 hr











  12/23/15 12/23/15 12/23/15





  12:00 13:00 14:00


 


Temperature 99.4 F  


 


Pulse Rate 98 H 97 H 97 H


 


Respiratory 10 L 10 L 10 L





Rate   


 


Blood Pressure 112/73 115/79 129/81


 


O2 Sat by Pulse 100 100 100





Oximetry   














  12/23/15 12/23/15 12/23/15





  15:00 16:00 18:00


 


Temperature 98.6 F 98.8 F 


 


Pulse Rate 109 H 107 H 97 H


 


Respiratory 12 10 L 7 L





Rate   


 


Blood Pressure 115/72 119/75 128/82


 


O2 Sat by Pulse 100 100 100





Oximetry   














  12/23/15 12/23/15 12/23/15





  20:00 20:10 22:00


 


Temperature 98.6 F  


 


Pulse Rate 100 H 99 H 98 H


 


Respiratory 21  24





Rate   


 


Blood Pressure 136/85 136/86 148/90


 


O2 Sat by Pulse 100  100





Oximetry   














  12/23/15 12/24/15 12/24/15





  23:00 00:00 01:00


 


Temperature  99.5 F 


 


Pulse Rate 93 H 115 H 91 H


 


Respiratory 19 19 14





Rate   


 


Blood Pressure 116/70 118/69 108/67


 


O2 Sat by Pulse 100 100 99





Oximetry   














  12/24/15 12/24/15 12/24/15





  02:00 03:00 04:00


 


Temperature   98.9 F


 


Pulse Rate 96 H 90 93 H


 


Respiratory 17 14 16





Rate   


 


Blood Pressure 113/72 112/67 119/76


 


O2 Sat by Pulse 100 100 100





Oximetry   














  12/24/15 12/24/15 12/24/15





  05:00 06:00 07:00


 


Temperature   


 


Pulse Rate 99 H 96 H 95 H


 


Respiratory 19 17 18





Rate   


 


Blood Pressure 137/91 H 149/73 145/95 H


 


O2 Sat by Pulse 99 100 99





Oximetry   














  12/24/15 12/24/15 12/24/15





  08:00 09:00 10:00


 


Temperature 98.8 F  


 


Pulse Rate 92 H 88 88


 


Respiratory 12 13 20





Rate   


 


Blood Pressure 111/67 120/78 134/90


 


O2 Sat by Pulse 100 100 100





Oximetry   











Intake and Output (last 12 hours): 


 Intake & Output











 12/23/15 12/24/15 12/24/15





 18:59 06:59 18:59


 


Intake Total 1280 1120 496


 


Output Total 800 700 


 


Balance 480 420 496


 


Intake:   


 


  IV 60  6


 


  Intake, Piggyback 100 100 100


 


  Oral  240 


 


  Tube Feeding 720 580 240


 


  Free Water Flush 400 200 150


 


Output:   


 


  Urine 800 700 


 


    Urethral (Lua) 800 700 


 


Other:   


 


  # Bowel Movements 1  














- Medications


Medications: 


 Current Medications





Acetaminophen (Tylenol 650mg/20.3ml Solution Ud)  650 mg PO Q6 PRN


   PRN Reason: Fever


   Last Admin: 12/18/15 02:28 Dose:  650 mg


Acetaminophen (Tylenol 650 Mg Supp)  650 mg [place] Q6 PRN


   PRN Reason: fevers


   Last Admin: 12/14/15 06:53 Dose:  650 mg


Albuterol/Ipratropium (Duoneb 3 Mg/0.5 Mg (3 Ml) Ud)  3 ml INH RQ6 [facility]


   Last Admin: 12/24/15 08:10 Dose:  3 ml


Aspirin (Ecotrin)  81 mg PO DAILY [facility]


   Last Admin: 12/24/15 08:40 Dose:  81 mg


Enalapril Maleate (Vasotec)  10 mg PO DAILY [facility]


   Last Admin: 12/24/15 08:44 Dose:  10 mg


Enoxaparin Sodium (Lovenox)  40 mg SC DAILY [facility]


   Last Admin: 12/24/15 08:41 Dose:  40 mg


Famotidine (Pepcid)  20 mg GT BID [facility]


   Last Admin: 12/24/15 08:44 Dose:  20 mg


Cefepime HCl 2 gm/ Sodium (Chloride)  100 mls @ 100 mls/hr IVPB Q12 [facility]


   Last Admin: 12/24/15 08:42 Dose:  100 mls/hr


Insulin Human Regular (Humulin R)  0 units SC ACHS [facility]


   PRN Reason: Protocol


   Last Admin: 12/23/15 21:25 Dose:  Not Given


Levetiracetam (Keppra)  500 mg PO BID [facility]


   Last Admin: 12/24/15 08:40 Dose:  500 mg


Metoprolol Tartrate (Lopressor)  100 mg PO Q12 [facility]


   Last Admin: 12/24/15 08:41 Dose:  100 mg


Multivitamins/Vitamin C (Multi-Delyn Liquid)  5 ml PO DAILY [facility]


   Last Admin: 12/24/15 08:48 Dose:  5 ml


Nitroglycerin (Nitro-Bid 2% Oint)  2 in TOP Q6 [facility]


   Last Admin: 12/24/15 09:06 Dose:  2 in











- Labs


Labs (last 24 hours): 


 Laboratory Results - last 24 hr











  12/23/15 12/23/15 12/23/15





  11:27 16:09 21:23


 


WBC   


 


RBC   


 


Hgb   


 


Hct   


 


MCV   


 


MCH   


 


MCHC   


 


RDW   


 


Plt Count   


 


MPV   


 


Neut % (Auto)   


 


Lymph % (Auto)   


 


Mono % (Auto)   


 


Eos % (Auto)   


 


Baso % (Auto)   


 


Neut #   


 


Lymph #   


 


Mono #   


 


Eos #   


 


Baso #   


 


pCO2   


 


pO2   


 


HCO3   


 


ABG pH   


 


ABG Total CO2   


 


ABG O2 Saturation   


 


ABG O2 Content   


 


ABG Base Excess   


 


ABG Hemoglobin   


 


ABG Carboxyhemoglobin   


 


POC ABG HHb (Measured)   


 


ABG Methemoglobin   


 


ABG O2 Capacity   


 


Chu Test   


 


A-a O2 Difference   


 


Hgb O2 Saturation   


 


FiO2   


 


Sodium   


 


Potassium   


 


Chloride   


 


Carbon Dioxide   


 


Anion Gap   


 


BUN   


 


Creatinine   


 


Est GFR ( Amer)   


 


Est GFR (Non-Af Amer)   


 


POC Glucose (mg/dL)  100  146 H  101


 


Random Glucose   


 


Calcium   


 


Total Bilirubin   


 


AST   


 


ALT   


 


Alkaline Phosphatase   


 


Total Protein   


 


Albumin   


 


Globulin   


 


Albumin/Globulin Ratio   














  12/24/15 12/24/15 12/24/15





  04:54 05:00 05:55


 


WBC   14.0 H 


 


RBC   3.12 L 


 


Hgb   9.9 L 


 


Hct   32.2 L 


 


MCV   103.2 H 


 


MCH   31.8 H 


 


MCHC   30.8 L 


 


RDW   13.7 


 


Plt Count   590 H 


 


MPV   7.5 


 


Neut % (Auto)   78.5 H 


 


Lymph % (Auto)   9.9 L 


 


Mono % (Auto)   6.8 


 


Eos % (Auto)   4.0 


 


Baso % (Auto)   0.8 


 


Neut #   11.0 H 


 


Lymph #   1.4 


 


Mono #   0.9 H 


 


Eos #   0.6 


 


Baso #   0.1 


 


pCO2  42  


 


pO2  171 H  


 


HCO3  27.4  


 


ABG pH  7.43  


 


ABG Total CO2  29.2 H  


 


ABG O2 Saturation  100.7 H  


 


ABG O2 Content  14.2 L  


 


ABG Base Excess  3.2 H  


 


ABG Hemoglobin  10.1 L  


 


ABG Carboxyhemoglobin  1.7 H  


 


POC ABG HHb (Measured)  -0.7 L  


 


ABG Methemoglobin  1.7  


 


ABG O2 Capacity  14.1 L  


 


Chu Test  Yes  


 


A-a O2 Difference  133.0  


 


Hgb O2 Saturation  97.3  


 


FiO2  50.0  


 


Sodium   144 


 


Potassium   3.8 


 


Chloride   107 


 


Carbon Dioxide   27 


 


Anion Gap   13 


 


BUN   19 


 


Creatinine   1.0 


 


Est GFR ( Amer)   > 60 


 


Est GFR (Non-Af Amer)   > 60 


 


POC Glucose (mg/dL)    103


 


Random Glucose   108 


 


Calcium   9.2 


 


Total Bilirubin   0.4 


 


AST   76 H D 


 


ALT   25 


 


Alkaline Phosphatase   146 H 


 


Total Protein   7.7 


 


Albumin   3.4 L 


 


Globulin   4.3 H 


 


Albumin/Globulin Ratio   0.8 L 














- Constitutional


Appears: Toxic, Chronically Ill





- Head Exam


Head Exam: NORMOCEPHALIC





- Eye Exam


Eye Exam: absent: Scleral icterus





- ENT Exam


ENT Exam: Mucous Membranes Dry





- Neck Exam


Neck exam: absent: Lymphadenopathy





- Respiratory Exam


Respiratory Exam: Decreased Breath Sounds, Rhonchi





- Cardiovascular Exam


Cardiovascular Exam: REGULAR RHYTHM, +S1, +S2





- GI/Abdominal Exam


GI & Abdominal Exam: Diminished Bowel Sounds, Soft.  absent: Tenderness





Assessment/Plan


(1) Agitation


Current Visit: Yes   Status: Acute


Comment: propofol d/c


ativan prn








(2) Aspiration pneumonia


Current Visit: Yes   Status: Acute


Comment: cipro


cont IV Cefapime


BD NEBS Q 6H PRN


f/u repeat c/s


psudomonas in trach asp


cxr improving


likely will need trach placement for optimal care








(3) Cardiac arrest


Current Visit: Yes   Status: Acute


Comment: nsr in 80-90s


cardio on board and pt is w/o further episodes of VT


no cardio interventions planned at present.


pt only appears to become tachycardic when agitated








(4) Diabetes mellitus with hyperglycemia


Current Visit: Yes   Status: Acute


Comment: riss per protocol








(5) History of seizure


Current Visit: Yes   Status: Acute


Comment: neuro consult


?? eeg


started on keppra








(6) NSTEMI (non-ST elevated myocardial infarction)


Current Visit: Yes   Status: Acute

## 2015-12-24 NOTE — CP.CCUPN
CCU Subjective





- Physician Review


Subjective (Free Text): 


***INTENSIVIST PROGRESS NOTE***


Patient examined, interim events reviewed:





Tachycardic and tachypneic overnight, placed back to AC mode, now awake and 

repsonsive, breathing 20 on AC 10 Ve= 9.1, PPP= 19, SPO2 100% on 509% and PEEP 

5. Afebrile, no temp spikes overnighhyt, HR 95 sinus, /95. 24H I/Os = 

2400/1500 ml.








EXAM-


HEENT: no icterus, no nystagmus


NECK: no visible JVD, supple, carotids equal upstroke bilat/no bruits


CHEST: decreased BS bases, no wheezes audible


HEART:  regular, distant, S1S2, no murmur audible, no rubs.


ABD:  soft, no increased distention, no focal tenderness,  no HSM. BS hypoactive

, 


EXT:    +UE bilat edema decreasing, no peripheral/ digital cyanosis, no 

palpable cords, distal pulses intact and symmetrical, SCDs on bilat. 


NEURO:  moves arms mnore than legs, +tone.


SKIN:   no rashes





LABS


WBC= 14.0


HGB= 9.9


PLTs = 590K





Na= 144


K=  3.8


HCO3= 27


BUN/Cr=  19/1.0


BS= 108


7.43/42/171


CXR:  ETT position OK above ru, improved interstitial chnages on the R, 

Left base remains hazy. 





Assessment: 


1.   Cardiac Arrest with resuscitation from VT. 


2.   Post-Resuscitation Therapeutic Hypothermia-completed


3.   Anoxic Encephalopathy


4.     Aspiration Pneumonia


5.    H/o Seizure Disorder with recurrence








PLAN:


1.   Day # 17 on MV support, off continuous sedation. Continue MV weans with 

SBTs as tolerated, but would not extubate yet unless he demonstrates a good 

cough reflex and wakefulness improves further. He may still need trach and he 

is mentally incompetent and cannot provide consent for himself. Sisters have 

not been contacted and have not appeared yet. Attainment of Guardianship should 

proceed as feasible. 


2.   Ongoing Cefepime covergae against Kleb and Psuedomonas isolates in sputum.


3.   Lopressor: increase0 to 100 mg Q12H to prevent further episodes of PSVT. 


4.   Needs PT eval for bedside PROM.


5.    Previous orders for DNR cancelled by me.








CCU Objective





- Vital Signs / Intake & Output


Vital Signs (Last 4 hours): 


Vital Signs











  Temp Pulse Resp BP Pulse Ox


 


 12/24/15 07:00   95 H  18  145/95 H  99


 


 12/24/15 06:00   96 H  17  149/73  100


 


 12/24/15 05:00   99 H  19  137/91 H  99


 


 12/24/15 04:00  98.9 F  93 H  16  119/76  100











Intake and Output (Last 8hrs): 


 Intake & Output











 12/23/15 12/24/15 12/24/15





 22:59 06:59 14:59


 


Intake Total 800 680 


 


Output Total 800 700 


 


Balance 0 -20 


 


Intake:   


 


  IV 20  


 


  Intake, Piggyback 100  


 


  Oral 120 120 


 


  Tube Feeding 360 460 


 


  Free Water Flush 200 100 


 


Output:   


 


  Urine 800 700 


 


    Urethral (Lua) 800 700 


 


Other:   


 


  # Bowel Movements 1  














- Physical Exam


Head: Positive for: Atraumatic, Normocephalic


Pupils: Positive for: PERRL


Extroacular Muscles: Positive for: EOMI


Conjunctiva: Positive for: Normal.  Negative for: Injected, Icteric


Neck: Positive for: Normal Range of Motion, Trachea Midline.  Negative for: 

Meningeal Signs, MIDLINE TENDERNESS, Paraspinal Tenderness, JVD, Lymphadenopathy

, Bruit, Other


Respiratory/Chest: Positive for: Clear to Auscultation, Good Air Exchange.  

Negative for: Respiratory Distress, Accessory Muscle Use, Wheezes, Decreased 

Breath Sounds, Rales, Retracting, Rhonchi, Tachypneic, Tender to Palpation, 

Other


Cardiovascular: Positive for: Normal S1, S2, Irregular Rhythm, Peripheal Pulses 

Present.  Negative for: Murmurs


Abdomen: Positive for: Normal Bowel Sounds.  Negative for: Tenderness, 

Distention, Peritoneal Signs


Upper Extremity: Positive for: Normal Inspection.  Negative for: Cyanosis, Edema


Lower Extremity: Positive for: Normal Inspection.  Negative for: Edema, CALF 

TENDERNESS





- Medications


Active Medications: 


Active Medications











Generic Name Dose Route Start Last Admin





  Trade Name Freq  PRN Reason Stop Dose Admin


 


Acetaminophen  650 mg 12/13/15 13:21 12/18/15 02:28





  Tylenol 650mg/20.3ml Solution Ud  PO   650 mg





  Q6 PRN   Administration





  Fever   


 


Acetaminophen  650 mg 12/13/15 23:57 12/14/15 06:53





  Tylenol 650 Mg Supp  UT   650 mg





  Q6 PRN   Administration





  fevers   


 


Albuterol/Ipratropium  3 ml 12/18/15 08:00 12/24/15 01:01





  Duoneb 3 Mg/0.5 Mg (3 Ml) Ud  INH   3 ml





  RQ6 MAGDALENA   Administration


 


Aspirin  81 mg 12/13/15 13:30 12/23/15 08:15





  Ecotrin  PO   81 mg





  DAILY MAGDALENA   Administration


 


Enalapril Maleate  10 mg 12/10/15 11:15 12/23/15 08:15





  Vasotec  PO   10 mg





  DAILY MAGDALENA   Administration


 


Enoxaparin Sodium  40 mg 12/23/15 10:15 12/23/15 11:24





  Lovenox  SC   40 mg





  DAILY MAGDALENA   Administration


 


Famotidine  20 mg 12/10/15 17:00 12/23/15 16:11





  Pepcid  GT   20 mg





  BID MAGDALENA   Administration


 


Cefepime HCl 2 gm/ Sodium  100 mls @ 100 mls/hr 12/21/15 21:00 12/23/15 20:10





  Chloride  IVPB   100 mls/hr





  Q12 MAGDALENA   Administration


 


Insulin Human Regular  0 units 12/10/15 22:00 12/23/15 21:25





  Humulin R  SC   Not Given





  [facility]   





  Protocol   


 


Levetiracetam  500 mg 12/18/15 09:00 12/23/15 16:11





  Keppra  PO   500 mg





  BID MAGDALENA   Administration


 


Metoprolol Tartrate  50 mg 12/23/15 09:00 12/23/15 20:10





  Lopressor  PO   50 mg





  Q12 MAGDALENA   Administration


 


Multivitamins/Vitamin C  5 ml 12/10/15 09:00 12/23/15 08:16





  Multi-Delyn Liquid  PO   5 ml





  DAILY MAGDALENA   Administration


 


Nitroglycerin  2 in 12/10/15 16:00 12/23/15 21:23





  Nitro-Bid 2% Oint  TOP   2 in





  Q6 MAGDALENA   Administration














- Patient Studies


Lab Studies: 


 Microbiology Studies











 12/20/15 07:24 Gram Stain - Final





 Trachasp Bronchial Culture - Final





    Pseudomonas Aeruginosa








 Lab Studies











  12/24/15 12/24/15 12/24/15 Range/Units





  05:55 05:00 04:54 


 


WBC   14.0 H   (4.8-10.8)  K/uL


 


RBC   3.12 L   (4.40-5.90)  Mil/uL


 


Hgb   9.9 L   (12.0-18.0)  g/dL


 


Hct   32.2 L   (35.0-51.0)  %


 


MCV   103.2 H   (80.0-94.0)  fl


 


MCH   31.8 H   (27.0-31.0)  pg


 


MCHC   30.8 L   (33.0-37.0)  g/dL


 


RDW   13.7   (11.5-14.5)  %


 


Plt Count   590 H   (130-400)  K/uL


 


MPV   7.5   (7.2-11.7)  fl


 


Neut % (Auto)   78.5 H   (50.0-75.0)  %


 


Lymph % (Auto)   9.9 L   (20.0-40.0)  %


 


Mono % (Auto)   6.8   (0.0-10.0)  %


 


Eos % (Auto)   4.0   (0.0-4.0)  %


 


Baso % (Auto)   0.8   (0.0-2.0)  %


 


Neut #   11.0 H   (1.8-7.0)  K/uL


 


Lymph #   1.4   (1.0-4.3)  K/uL


 


Mono #   0.9 H   (0.0-0.8)  K/uL


 


Eos #   0.6   (0.0-0.7)  K/uL


 


Baso #   0.1   (0.0-0.2)  K/uL


 


Neutrophils % (Manual)     (42-75)  %


 


Lymphocytes % (Manual)     (20-50)  %


 


Monocytes % (Manual)     (0-10)  %


 


Toxic Granulation     


 


Platelet Estimate     (NORMAL)  


 


Hypochromasia (manual)     


 


Macrocytosis (manual)     


 


Tear Drop Cells     


 


pCO2    42  (35-45)  mm/Hg


 


pO2    171 H  ()  mm/Hg


 


HCO3    27.4  (21-28)  mmol/L


 


ABG pH    7.43  (7.35-7.45)  


 


ABG Total CO2    29.2 H  (22-28)  mmol/L


 


ABG O2 Saturation    100.7 H  (95-98)  %


 


ABG O2 Content    14.2 L  (15-23)  ML/dL


 


ABG Base Excess    3.2 H  (-2.0-3.0)  mmol/L


 


ABG Hemoglobin    10.1 L  (11.7-17.4)  g/dL


 


ABG Carboxyhemoglobin    1.7 H  (0.5-1.5)  %


 


POC ABG HHb (Measured)    -0.7 L  (0.0-5.0)  %


 


ABG Methemoglobin    1.7  (0.0-3.0)  %


 


ABG O2 Capacity    14.1 L  (16-24)  mL/dL


 


Chu Test    Yes  


 


A-a O2 Difference    133.0  mm/Hg


 


Hgb O2 Saturation    97.3  (95.0-98.0)  %


 


FiO2    50.0  %


 


Sodium   144   (132-148)  mmol/l


 


Potassium   3.8   (3.6-5.0)  MMOL/L


 


Chloride   107   ()  mmol/L


 


Carbon Dioxide   27   (22-30)  mmol/L


 


Anion Gap   13   (10-20)  


 


BUN   19   (9-20)  mg/dl


 


Creatinine   1.0   (0.8-1.5)  mg/dL


 


Est GFR (African Amer)   > 60   


 


Est GFR (Non-Af Amer)   > 60   


 


POC Glucose (mg/dL)  103    ()  mg/dL


 


Random Glucose   108   ()  mg/dL


 


Calcium   9.2   (8.4-10.2)  mg/dL


 


Total Bilirubin   0.4   (0.2-1.3)  mg/dl


 


AST   76 H D   (17-59)  U/L


 


ALT   25   (21-72)  U/L


 


Alkaline Phosphatase   146 H   ()  U/L


 


Total Protein   7.7   (6.3-8.2)  G/DL


 


Albumin   3.4 L   (3.5-5.0)  g/dL


 


Globulin   4.3 H   (2.2-3.9)  gm/dL


 


Albumin/Globulin Ratio   0.8 L   (1.0-2.1)  














  12/23/15 12/23/15 12/23/15 Range/Units





  21:23 16:09 11:27 


 


WBC     (4.8-10.8)  K/uL


 


RBC     (4.40-5.90)  Mil/uL


 


Hgb     (12.0-18.0)  g/dL


 


Hct     (35.0-51.0)  %


 


MCV     (80.0-94.0)  fl


 


MCH     (27.0-31.0)  pg


 


MCHC     (33.0-37.0)  g/dL


 


RDW     (11.5-14.5)  %


 


Plt Count     (130-400)  K/uL


 


MPV     (7.2-11.7)  fl


 


Neut % (Auto)     (50.0-75.0)  %


 


Lymph % (Auto)     (20.0-40.0)  %


 


Mono % (Auto)     (0.0-10.0)  %


 


Eos % (Auto)     (0.0-4.0)  %


 


Baso % (Auto)     (0.0-2.0)  %


 


Neut #     (1.8-7.0)  K/uL


 


Lymph #     (1.0-4.3)  K/uL


 


Mono #     (0.0-0.8)  K/uL


 


Eos #     (0.0-0.7)  K/uL


 


Baso #     (0.0-0.2)  K/uL


 


Neutrophils % (Manual)     (42-75)  %


 


Lymphocytes % (Manual)     (20-50)  %


 


Monocytes % (Manual)     (0-10)  %


 


Toxic Granulation     


 


Platelet Estimate     (NORMAL)  


 


Hypochromasia (manual)     


 


Macrocytosis (manual)     


 


Tear Drop Cells     


 


pCO2     (35-45)  mm/Hg


 


pO2     ()  mm/Hg


 


HCO3     (21-28)  mmol/L


 


ABG pH     (7.35-7.45)  


 


ABG Total CO2     (22-28)  mmol/L


 


ABG O2 Saturation     (95-98)  %


 


ABG O2 Content     (15-23)  ML/dL


 


ABG Base Excess     (-2.0-3.0)  mmol/L


 


ABG Hemoglobin     (11.7-17.4)  g/dL


 


ABG Carboxyhemoglobin     (0.5-1.5)  %


 


POC ABG HHb (Measured)     (0.0-5.0)  %


 


ABG Methemoglobin     (0.0-3.0)  %


 


ABG O2 Capacity     (16-24)  mL/dL


 


Chu Test     


 


A-a O2 Difference     mm/Hg


 


Hgb O2 Saturation     (95.0-98.0)  %


 


FiO2     %


 


Sodium     (132-148)  mmol/l


 


Potassium     (3.6-5.0)  MMOL/L


 


Chloride     ()  mmol/L


 


Carbon Dioxide     (22-30)  mmol/L


 


Anion Gap     (10-20)  


 


BUN     (9-20)  mg/dl


 


Creatinine     (0.8-1.5)  mg/dL


 


Est GFR (African Amer)     


 


Est GFR (Non-Af Amer)     


 


POC Glucose (mg/dL)  101  146 H  100  ()  mg/dL


 


Random Glucose     ()  mg/dL


 


Calcium     (8.4-10.2)  mg/dL


 


Total Bilirubin     (0.2-1.3)  mg/dl


 


AST     (17-59)  U/L


 


ALT     (21-72)  U/L


 


Alkaline Phosphatase     ()  U/L


 


Total Protein     (6.3-8.2)  G/DL


 


Albumin     (3.5-5.0)  g/dL


 


Globulin     (2.2-3.9)  gm/dL


 


Albumin/Globulin Ratio     (1.0-2.1)  














  12/23/15 Range/Units





  06:00 


 


WBC   (4.8-10.8)  K/uL


 


RBC   (4.40-5.90)  Mil/uL


 


Hgb   (12.0-18.0)  g/dL


 


Hct   (35.0-51.0)  %


 


MCV   (80.0-94.0)  fl


 


MCH   (27.0-31.0)  pg


 


MCHC   (33.0-37.0)  g/dL


 


RDW   (11.5-14.5)  %


 


Plt Count   (130-400)  K/uL


 


MPV   (7.2-11.7)  fl


 


Neut % (Auto)   (50.0-75.0)  %


 


Lymph % (Auto)   (20.0-40.0)  %


 


Mono % (Auto)   (0.0-10.0)  %


 


Eos % (Auto)   (0.0-4.0)  %


 


Baso % (Auto)   (0.0-2.0)  %


 


Neut #   (1.8-7.0)  K/uL


 


Lymph #   (1.0-4.3)  K/uL


 


Mono #   (0.0-0.8)  K/uL


 


Eos #   (0.0-0.7)  K/uL


 


Baso #   (0.0-0.2)  K/uL


 


Neutrophils % (Manual)  84 H  (42-75)  %


 


Lymphocytes % (Manual)  12 L  (20-50)  %


 


Monocytes % (Manual)  4  (0-10)  %


 


Toxic Granulation  Present  


 


Platelet Estimate  Increased H  (NORMAL)  


 


Hypochromasia (manual)  Slight  


 


Macrocytosis (manual)  Slight  


 


Tear Drop Cells  Slight  


 


pCO2   (35-45)  mm/Hg


 


pO2   ()  mm/Hg


 


HCO3   (21-28)  mmol/L


 


ABG pH   (7.35-7.45)  


 


ABG Total CO2   (22-28)  mmol/L


 


ABG O2 Saturation   (95-98)  %


 


ABG O2 Content   (15-23)  ML/dL


 


ABG Base Excess   (-2.0-3.0)  mmol/L


 


ABG Hemoglobin   (11.7-17.4)  g/dL


 


ABG Carboxyhemoglobin   (0.5-1.5)  %


 


POC ABG HHb (Measured)   (0.0-5.0)  %


 


ABG Methemoglobin   (0.0-3.0)  %


 


ABG O2 Capacity   (16-24)  mL/dL


 


Chu Test   


 


A-a O2 Difference   mm/Hg


 


Hgb O2 Saturation   (95.0-98.0)  %


 


FiO2   %


 


Sodium   (132-148)  mmol/l


 


Potassium   (3.6-5.0)  MMOL/L


 


Chloride   ()  mmol/L


 


Carbon Dioxide   (22-30)  mmol/L


 


Anion Gap   (10-20)  


 


BUN   (9-20)  mg/dl


 


Creatinine   (0.8-1.5)  mg/dL


 


Est GFR (African Amer)   


 


Est GFR (Non-Af Amer)   


 


POC Glucose (mg/dL)   ()  mg/dL


 


Random Glucose   ()  mg/dL


 


Calcium   (8.4-10.2)  mg/dL


 


Total Bilirubin   (0.2-1.3)  mg/dl


 


AST   (17-59)  U/L


 


ALT   (21-72)  U/L


 


Alkaline Phosphatase   ()  U/L


 


Total Protein   (6.3-8.2)  G/DL


 


Albumin   (3.5-5.0)  g/dL


 


Globulin   (2.2-3.9)  gm/dL


 


Albumin/Globulin Ratio   (1.0-2.1)  








 Laboratory Results - last 24 hr











  12/23/15 12/23/15 12/23/15





  06:00 11:27 16:09


 


WBC   


 


RBC   


 


Hgb   


 


Hct   


 


MCV   


 


MCH   


 


MCHC   


 


RDW   


 


Plt Count   


 


MPV   


 


Neut % (Auto)   


 


Lymph % (Auto)   


 


Mono % (Auto)   


 


Eos % (Auto)   


 


Baso % (Auto)   


 


Neut #   


 


Lymph #   


 


Mono #   


 


Eos #   


 


Baso #   


 


Neutrophils % (Manual)  84 H  


 


Lymphocytes % (Manual)  12 L  


 


Monocytes % (Manual)  4  


 


Toxic Granulation  Present  


 


Platelet Estimate  Increased H  


 


Hypochromasia (manual)  Slight  


 


Macrocytosis (manual)  Slight  


 


Tear Drop Cells  Slight  


 


pCO2   


 


pO2   


 


HCO3   


 


ABG pH   


 


ABG Total CO2   


 


ABG O2 Saturation   


 


ABG O2 Content   


 


ABG Base Excess   


 


ABG Hemoglobin   


 


ABG Carboxyhemoglobin   


 


POC ABG HHb (Measured)   


 


ABG Methemoglobin   


 


ABG O2 Capacity   


 


Chu Test   


 


A-a O2 Difference   


 


Hgb O2 Saturation   


 


FiO2   


 


Sodium   


 


Potassium   


 


Chloride   


 


Carbon Dioxide   


 


Anion Gap   


 


BUN   


 


Creatinine   


 


Est GFR ( Amer)   


 


Est GFR (Non-Af Amer)   


 


POC Glucose (mg/dL)   100  146 H


 


Random Glucose   


 


Calcium   


 


Total Bilirubin   


 


AST   


 


ALT   


 


Alkaline Phosphatase   


 


Total Protein   


 


Albumin   


 


Globulin   


 


Albumin/Globulin Ratio   














  12/23/15 12/24/15 12/24/15





  21:23 04:54 05:00


 


WBC    14.0 H


 


RBC    3.12 L


 


Hgb    9.9 L


 


Hct    32.2 L


 


MCV    103.2 H


 


MCH    31.8 H


 


MCHC    30.8 L


 


RDW    13.7


 


Plt Count    590 H


 


MPV    7.5


 


Neut % (Auto)    78.5 H


 


Lymph % (Auto)    9.9 L


 


Mono % (Auto)    6.8


 


Eos % (Auto)    4.0


 


Baso % (Auto)    0.8


 


Neut #    11.0 H


 


Lymph #    1.4


 


Mono #    0.9 H


 


Eos #    0.6


 


Baso #    0.1


 


Neutrophils % (Manual)   


 


Lymphocytes % (Manual)   


 


Monocytes % (Manual)   


 


Toxic Granulation   


 


Platelet Estimate   


 


Hypochromasia (manual)   


 


Macrocytosis (manual)   


 


Tear Drop Cells   


 


pCO2   42 


 


pO2   171 H 


 


HCO3   27.4 


 


ABG pH   7.43 


 


ABG Total CO2   29.2 H 


 


ABG O2 Saturation   100.7 H 


 


ABG O2 Content   14.2 L 


 


ABG Base Excess   3.2 H 


 


ABG Hemoglobin   10.1 L 


 


ABG Carboxyhemoglobin   1.7 H 


 


POC ABG HHb (Measured)   -0.7 L 


 


ABG Methemoglobin   1.7 


 


ABG O2 Capacity   14.1 L 


 


Chu Test   Yes 


 


A-a O2 Difference   133.0 


 


Hgb O2 Saturation   97.3 


 


FiO2   50.0 


 


Sodium    144


 


Potassium    3.8


 


Chloride    107


 


Carbon Dioxide    27


 


Anion Gap    13


 


BUN    19


 


Creatinine    1.0


 


Est GFR ( Amer)    > 60


 


Est GFR (Non-Af Amer)    > 60


 


POC Glucose (mg/dL)  101  


 


Random Glucose    108


 


Calcium    9.2


 


Total Bilirubin    0.4


 


AST    76 H D


 


ALT    25


 


Alkaline Phosphatase    146 H


 


Total Protein    7.7


 


Albumin    3.4 L


 


Globulin    4.3 H


 


Albumin/Globulin Ratio    0.8 L














  12/24/15





  05:55


 


WBC 


 


RBC 


 


Hgb 


 


Hct 


 


MCV 


 


MCH 


 


MCHC 


 


RDW 


 


Plt Count 


 


MPV 


 


Neut % (Auto) 


 


Lymph % (Auto) 


 


Mono % (Auto) 


 


Eos % (Auto) 


 


Baso % (Auto) 


 


Neut # 


 


Lymph # 


 


Mono # 


 


Eos # 


 


Baso # 


 


Neutrophils % (Manual) 


 


Lymphocytes % (Manual) 


 


Monocytes % (Manual) 


 


Toxic Granulation 


 


Platelet Estimate 


 


Hypochromasia (manual) 


 


Macrocytosis (manual) 


 


Tear Drop Cells 


 


pCO2 


 


pO2 


 


HCO3 


 


ABG pH 


 


ABG Total CO2 


 


ABG O2 Saturation 


 


ABG O2 Content 


 


ABG Base Excess 


 


ABG Hemoglobin 


 


ABG Carboxyhemoglobin 


 


POC ABG HHb (Measured) 


 


ABG Methemoglobin 


 


ABG O2 Capacity 


 


Chu Test 


 


A-a O2 Difference 


 


Hgb O2 Saturation 


 


FiO2 


 


Sodium 


 


Potassium 


 


Chloride 


 


Carbon Dioxide 


 


Anion Gap 


 


BUN 


 


Creatinine 


 


Est GFR ( Amer) 


 


Est GFR (Non-Af Amer) 


 


POC Glucose (mg/dL)  103


 


Random Glucose 


 


Calcium 


 


Total Bilirubin 


 


AST 


 


ALT 


 


Alkaline Phosphatase 


 


Total Protein 


 


Albumin 


 


Globulin 


 


Albumin/Globulin Ratio 











Fingerstick Blood Sugar Results: 103

## 2015-12-24 NOTE — RAD
HISTORY:

day #17 on MV  



COMPARISON:

Multiple previous chest radiographs, including the most recent one 

from 12/22/2015. 



FINDINGS:



LUNGS:

Bilateral interstitial and alveolar opacities, with a predominantly 

parahilar distribution. The opacities are stable as of 12/24/2015 but 

improved somewhat as of 12/20/2015 and 12/21/2015.



PLEURA:

Probable small left effusion.  No pneumothorax.



CARDIOVASCULAR:

Normal.



OSSEOUS STRUCTURES:

No significant abnormalities.



VISUALIZED UPPER ABDOMEN:

Normal.



OTHER FINDINGS:

The tip of the endotracheal tube is approximately 3.4 cm above the 

ru. The tip of the nasogastric tube overlies the stomach. The tip 

of the right subclavian central venous catheter overlies the still 

SVC. 



IMPRESSION:





1. Interstitial and alveolar opacities with parahilar distribution.  

The opacities are stable as of yesterday but improved somewhat as of 

earlier few days. Findings may be infectious/inflammatory or related 

to edema. 



2. The life support lines and tubes are in good positions.

## 2015-12-24 NOTE — CP.PCM.PN
Subjective





- Subjective


Subjective: 


pt remains sedated in bed, was able to go to cpap setting overnight but went 

back to ac when pt got sleepy.  vs and cxr improving.  b/w noted. 





Review of Systems





- Review of Systems


Systems not reviewed;Unavailable: Intubated





- Constitutional


Constitutional: UN





- EENT


Eyes: UNREMARKABLE


Ears: UNREMARKABLE


Nose/Mouth/Throat: UNREMARKABLE





- Cardiovascular


Cardiovascular: UNREMARKABLE





- Respiratory


Respiratory: As Per HPI, Chest Congestion, Excessive Mucous Production


Additional comments: 


improving





- Gastrointestinal


Gastrointestinal: UNREMARKABLE





- Genitourinary


Genitourinary: UNREMARKABLE





- Reproductive: Male


Reproductive:Male: UNREMARKABLE





- Musculoskeletal


Musculoskeletal: UNREMARKABLE





- Integumentary


Integumentary: UNREMARKABLE





- Neurological


Neurological: UNREMARKABLE





- Psychiatric


Psychiatric: UNREMARKABLE





- Endocrine


Endocrine: UNREMARKABLE





- Hematologic/Lymphatic


Hematologic: UNREMARKABLE





Objective





- Vital Signs/Intake and Output


Vital Signs (last 24 hours): 


 Vital Signs - 24 hr











  12/23/15 12/23/15 12/23/15





  08:56 08:57 10:00


 


Temperature   


 


Pulse Rate 169 H 118 H 101 H


 


Respiratory  18 22





Rate   


 


Blood Pressure  129/91 H 126/74


 


O2 Sat by Pulse  98 100





Oximetry   














  12/23/15 12/23/15 12/23/15





  12:00 13:00 14:00


 


Temperature 99.4 F  


 


Pulse Rate 98 H 97 H 97 H


 


Respiratory 10 L 10 L 10 L





Rate   


 


Blood Pressure 112/73 115/79 129/81


 


O2 Sat by Pulse 100 100 100





Oximetry   














  12/23/15 12/23/15 12/23/15





  15:00 16:00 18:00


 


Temperature 98.6 F 98.8 F 


 


Pulse Rate 109 H 107 H 97 H


 


Respiratory 12 10 L 7 L





Rate   


 


Blood Pressure 115/72 119/75 128/82


 


O2 Sat by Pulse 100 100 100





Oximetry   














  12/23/15 12/23/15 12/23/15





  20:00 20:10 22:00


 


Temperature 98.6 F  


 


Pulse Rate 100 H 99 H 98 H


 


Respiratory 21  24





Rate   


 


Blood Pressure 136/85 136/86 148/90


 


O2 Sat by Pulse 100  100





Oximetry   














  12/23/15 12/24/15 12/24/15





  23:00 00:00 01:00


 


Temperature  99.5 F 


 


Pulse Rate 93 H 115 H 91 H


 


Respiratory 19 19 14





Rate   


 


Blood Pressure 116/70 118/69 108/67


 


O2 Sat by Pulse 100 100 99





Oximetry   














  12/24/15 12/24/15 12/24/15





  02:00 03:00 04:00


 


Temperature   98.9 F


 


Pulse Rate 96 H 90 93 H


 


Respiratory 17 14 16





Rate   


 


Blood Pressure 113/72 112/67 119/76


 


O2 Sat by Pulse 100 100 100





Oximetry   














  12/24/15 12/24/15 12/24/15





  05:00 06:00 07:00


 


Temperature   


 


Pulse Rate 99 H 96 H 95 H


 


Respiratory 19 17 18





Rate   


 


Blood Pressure 137/91 H 149/73 145/95 H


 


O2 Sat by Pulse 99 100 99





Oximetry   











Intake and Output (last 12 hours): 


 Intake & Output











 12/23/15 12/24/15 12/24/15





 18:59 06:59 18:59


 


Intake Total 1280 1120 


 


Output Total 800 700 


 


Balance 480 420 


 


Intake:   


 


  IV 60  


 


  Intake, Piggyback 100 100 


 


  Oral  240 


 


  Tube Feeding 720 580 


 


  Free Water Flush 400 200 


 


Output:   


 


  Urine 800 700 


 


    Urethral (Mcgee) 800 700 


 


Other:   


 


  # Bowel Movements 1  














- Medications


Medications: 


 Current Medications





Acetaminophen (Tylenol 650mg/20.3ml Solution Ud)  650 mg PO Q6 PRN


   PRN Reason: Fever


   Last Admin: 12/18/15 02:28 Dose:  650 mg


Acetaminophen (Tylenol 650 Mg Supp)  650 mg IN Q6 PRN


   PRN Reason: fevers


   Last Admin: 12/14/15 06:53 Dose:  650 mg


Albuterol/Ipratropium (Duoneb 3 Mg/0.5 Mg (3 Ml) Ud)  3 ml INH RQ6 [facility]


   Last Admin: 12/24/15 08:10 Dose:  3 ml


Aspirin (Ecotrin)  81 mg PO DAILY [facility]


   Last Admin: 12/23/15 08:15 Dose:  81 mg


Enalapril Maleate (Vasotec)  10 mg PO DAILY [facility]


   Last Admin: 12/23/15 08:15 Dose:  10 mg


Enoxaparin Sodium (Lovenox)  40 mg SC DAILY [facility]


   Last Admin: 12/23/15 11:24 Dose:  40 mg


Famotidine (Pepcid)  20 mg GT BID [facility]


   Last Admin: 12/23/15 16:11 Dose:  20 mg


Cefepime HCl 2 gm/ Sodium (Chloride)  100 mls @ 100 mls/hr IVPB Q12 [facility]


   Last Admin: 12/23/15 20:10 Dose:  100 mls/hr


Insulin Human Regular (Humulin R)  0 units SC ACHS MAGDALENA


   PRN Reason: Protocol


   Last Admin: 12/23/15 21:25 Dose:  Not Given


Levetiracetam (Keppra)  500 mg PO BID [facility]


   Last Admin: 12/23/15 16:11 Dose:  500 mg


Metoprolol Tartrate (Lopressor)  100 mg PO Q12 [facility]


Multivitamins/Vitamin C (Multi-Delyn Liquid)  5 ml PO DAILY [facility]


   Last Admin: 12/23/15 08:16 Dose:  5 ml


Nitroglycerin (Nitro-Bid 2% Oint)  2 in TOP Q6 [facility]


   Last Admin: 12/23/15 21:23 Dose:  2 in











- Labs


Labs (last 24 hours): 


 Laboratory Results - last 24 hr











  12/23/15 12/23/15 12/23/15





  06:00 11:27 16:09


 


WBC   


 


RBC   


 


Hgb   


 


Hct   


 


MCV   


 


MCH   


 


MCHC   


 


RDW   


 


Plt Count   


 


MPV   


 


Neut % (Auto)   


 


Lymph % (Auto)   


 


Mono % (Auto)   


 


Eos % (Auto)   


 


Baso % (Auto)   


 


Neut #   


 


Lymph #   


 


Mono #   


 


Eos #   


 


Baso #   


 


Neutrophils % (Manual)  84 H  


 


Lymphocytes % (Manual)  12 L  


 


Monocytes % (Manual)  4  


 


Toxic Granulation  Present  


 


Platelet Estimate  Increased H  


 


Hypochromasia (manual)  Slight  


 


Macrocytosis (manual)  Slight  


 


Tear Drop Cells  Slight  


 


pCO2   


 


pO2   


 


HCO3   


 


ABG pH   


 


ABG Total CO2   


 


ABG O2 Saturation   


 


ABG O2 Content   


 


ABG Base Excess   


 


ABG Hemoglobin   


 


ABG Carboxyhemoglobin   


 


POC ABG HHb (Measured)   


 


ABG Methemoglobin   


 


ABG O2 Capacity   


 


Chu Test   


 


A-a O2 Difference   


 


Hgb O2 Saturation   


 


FiO2   


 


Sodium   


 


Potassium   


 


Chloride   


 


Carbon Dioxide   


 


Anion Gap   


 


BUN   


 


Creatinine   


 


Est GFR ( Amer)   


 


Est GFR (Non-Af Amer)   


 


POC Glucose (mg/dL)   100  146 H


 


Random Glucose   


 


Calcium   


 


Total Bilirubin   


 


AST   


 


ALT   


 


Alkaline Phosphatase   


 


Total Protein   


 


Albumin   


 


Globulin   


 


Albumin/Globulin Ratio   














  12/23/15 12/24/15 12/24/15





  21:23 04:54 05:00


 


WBC    14.0 H


 


RBC    3.12 L


 


Hgb    9.9 L


 


Hct    32.2 L


 


MCV    103.2 H


 


MCH    31.8 H


 


MCHC    30.8 L


 


RDW    13.7


 


Plt Count    590 H


 


MPV    7.5


 


Neut % (Auto)    78.5 H


 


Lymph % (Auto)    9.9 L


 


Mono % (Auto)    6.8


 


Eos % (Auto)    4.0


 


Baso % (Auto)    0.8


 


Neut #    11.0 H


 


Lymph #    1.4


 


Mono #    0.9 H


 


Eos #    0.6


 


Baso #    0.1


 


Neutrophils % (Manual)   


 


Lymphocytes % (Manual)   


 


Monocytes % (Manual)   


 


Toxic Granulation   


 


Platelet Estimate   


 


Hypochromasia (manual)   


 


Macrocytosis (manual)   


 


Tear Drop Cells   


 


pCO2   42 


 


pO2   171 H 


 


HCO3   27.4 


 


ABG pH   7.43 


 


ABG Total CO2   29.2 H 


 


ABG O2 Saturation   100.7 H 


 


ABG O2 Content   14.2 L 


 


ABG Base Excess   3.2 H 


 


ABG Hemoglobin   10.1 L 


 


ABG Carboxyhemoglobin   1.7 H 


 


POC ABG HHb (Measured)   -0.7 L 


 


ABG Methemoglobin   1.7 


 


ABG O2 Capacity   14.1 L 


 


Chu Test   Yes 


 


A-a O2 Difference   133.0 


 


Hgb O2 Saturation   97.3 


 


FiO2   50.0 


 


Sodium    144


 


Potassium    3.8


 


Chloride    107


 


Carbon Dioxide    27


 


Anion Gap    13


 


BUN    19


 


Creatinine    1.0


 


Est GFR ( Amer)    > 60


 


Est GFR (Non-Af Amer)    > 60


 


POC Glucose (mg/dL)  101  


 


Random Glucose    108


 


Calcium    9.2


 


Total Bilirubin    0.4


 


AST    76 H D


 


ALT    25


 


Alkaline Phosphatase    146 H


 


Total Protein    7.7


 


Albumin    3.4 L


 


Globulin    4.3 H


 


Albumin/Globulin Ratio    0.8 L














  12/24/15





  05:55


 


WBC 


 


RBC 


 


Hgb 


 


Hct 


 


MCV 


 


MCH 


 


MCHC 


 


RDW 


 


Plt Count 


 


MPV 


 


Neut % (Auto) 


 


Lymph % (Auto) 


 


Mono % (Auto) 


 


Eos % (Auto) 


 


Baso % (Auto) 


 


Neut # 


 


Lymph # 


 


Mono # 


 


Eos # 


 


Baso # 


 


Neutrophils % (Manual) 


 


Lymphocytes % (Manual) 


 


Monocytes % (Manual) 


 


Toxic Granulation 


 


Platelet Estimate 


 


Hypochromasia (manual) 


 


Macrocytosis (manual) 


 


Tear Drop Cells 


 


pCO2 


 


pO2 


 


HCO3 


 


ABG pH 


 


ABG Total CO2 


 


ABG O2 Saturation 


 


ABG O2 Content 


 


ABG Base Excess 


 


ABG Hemoglobin 


 


ABG Carboxyhemoglobin 


 


POC ABG HHb (Measured) 


 


ABG Methemoglobin 


 


ABG O2 Capacity 


 


Chu Test 


 


A-a O2 Difference 


 


Hgb O2 Saturation 


 


FiO2 


 


Sodium 


 


Potassium 


 


Chloride 


 


Carbon Dioxide 


 


Anion Gap 


 


BUN 


 


Creatinine 


 


Est GFR ( Amer) 


 


Est GFR (Non-Af Amer) 


 


POC Glucose (mg/dL)  103


 


Random Glucose 


 


Calcium 


 


Total Bilirubin 


 


AST 


 


ALT 


 


Alkaline Phosphatase 


 


Total Protein 


 


Albumin 


 


Globulin 


 


Albumin/Globulin Ratio 














- Constitutional


Appears: Non-toxic, No Acute Distress, Chronically Ill





- Head Exam


Head Exam: ATRAUMATIC, NORMAL INSPECTION, NORMOCEPHALIC





- Eye Exam


Eye Exam: EOMI





- Respiratory Exam


Respiratory Exam: Clear to PA & Lateral, NORMAL BREATHING PATTERN, UNREMARKABLE





- Cardiovascular Exam


Cardiovascular Exam: REGULAR RHYTHM, RRR





- GI/Abdominal Exam


GI & Abdominal Exam: Normal Bowel Sounds, Soft, Unremarkable





- Extremities Exam


Extremities exam: full ROM, normal capillary refill, normal inspection, pedal 

pulses present





- Back Exam


Back exam: FULL ROM





- Neurological Exam


Neurological exam: Alert, CN II-XII Intact, Normal Gait, Oriented x3, Reflexes 

Normal





- Psychiatric Exam


Psychiatric exam: Normal Affect, Normal Mood





- Skin


Skin Exam: Dry, Intact, Normal Color, Warm





Assessment/Plan


(1) Cardiac arrest


Current Visit: Yes   Status: Acute


Comment: nsr in 80-90s


cardio on board and pt is w/o further episodes of VT


no cardio interventions planned at present.


pt only appears to become tachycardic when agitated








(2) DVT prophylaxis


Current Visit: Yes   Status: Acute


Comment: lovenox








(3) Scrotal edema


Current Visit: Yes   Status: Acute


Comment: scrotal us-noted


urology consult-dr cortes to see pt when stable








(4) History of seizure


Current Visit: Yes   Status: Acute


Comment: neuro consult


?? eeg


started on keppra








(5) Diabetes mellitus with hyperglycemia


Current Visit: Yes   Status: Acute


Comment: riss per protocol








(6) Agitation


Current Visit: Yes   Status: Acute


Comment: propofol d/c


ativan prn








(7) UTI (urinary tract infection)


Current Visit: Yes   Status: Acute


Comment: cont anbx


mcgee cath


uro consult pending








(8) Aspiration pneumonia


Current Visit: Yes   Status: Acute


Comment: cipro


cont IV Cefapime


BD NEBS Q 6H PRN


f/u repeat c/s


psudomonas in trach asp


cxr improving


likely will need trach placement for optimal care











- Assessment and Plan (Free Text)


Assessment: 


pending guardianship

## 2015-12-25 LAB
ARTERIAL BLOOD GAS HEMOGLOBIN: 9.8 G/DL (ref 11.7–17.4)
ARTERIAL BLOOD GAS O2 SAT: 98.6 % (ref 95–98)
ARTERIAL BLOOD GAS PCO2: 41 MM/HG (ref 35–45)
ARTERIAL BLOOD GAS TCO2: 30.5 MMOL/L (ref 22–28)
ARTERIAL PATENCY WRIST A: YES
BUN SERPL-MCNC: 21 MG/DL (ref 9–20)
CALCIUM SERPL-MCNC: 9.2 MG/DL (ref 8.4–10.2)
ERYTHROCYTE [DISTWIDTH] IN BLOOD BY AUTOMATED COUNT: 13.4 % (ref 11.5–14.5)
GFR NON-AFRICAN AMERICAN: > 60
HCO3 BLDA-SCNC: 28.8 MMOL/L (ref 21–28)
HGB BLD-MCNC: 9.8 G/DL (ref 12–18)
INHALED O2 CONCENTRATION: 50 %
MCH RBC QN AUTO: 31.9 PG (ref 27–31)
MCHC RBC AUTO-ENTMCNC: 30.9 G/DL (ref 33–37)
MCV RBC AUTO: 103.4 FL (ref 80–94)
O2 CAP BLDA-SCNC: 13.8 ML/DL (ref 16–24)
O2 CT BLDA-SCNC: 13.6 ML/DL (ref 15–23)
PH BLDA: 7.46 [PH] (ref 7.35–7.45)
PLATELET # BLD: 532 K/UL (ref 130–400)
PO2 BLDA: 128 MM/HG (ref 80–100)
RBC # BLD AUTO: 3.06 MIL/UL (ref 4.4–5.9)
WBC # BLD AUTO: 11.1 K/UL (ref 4.8–10.8)

## 2015-12-25 RX ADMIN — LEVETIRACETAM SCH MG: 100 SOLUTION ORAL at 08:19

## 2015-12-25 RX ADMIN — Medication SCH ML: at 08:20

## 2015-12-25 RX ADMIN — ENOXAPARIN SODIUM SCH MG: 40 INJECTION SUBCUTANEOUS at 08:19

## 2015-12-25 RX ADMIN — IPRATROPIUM BROMIDE AND ALBUTEROL SULFATE SCH ML: .5; 3 SOLUTION RESPIRATORY (INHALATION) at 14:00

## 2015-12-25 RX ADMIN — IPRATROPIUM BROMIDE AND ALBUTEROL SULFATE SCH ML: .5; 3 SOLUTION RESPIRATORY (INHALATION) at 19:13

## 2015-12-25 RX ADMIN — IPRATROPIUM BROMIDE AND ALBUTEROL SULFATE SCH ML: .5; 3 SOLUTION RESPIRATORY (INHALATION) at 08:29

## 2015-12-25 RX ADMIN — NITROGLYCERIN SCH IN: 20 OINTMENT TOPICAL at 09:42

## 2015-12-25 RX ADMIN — IPRATROPIUM BROMIDE AND ALBUTEROL SULFATE SCH ML: .5; 3 SOLUTION RESPIRATORY (INHALATION) at 01:00

## 2015-12-25 RX ADMIN — LEVETIRACETAM SCH MG: 100 SOLUTION ORAL at 16:08

## 2015-12-25 RX ADMIN — NITROGLYCERIN SCH IN: 20 OINTMENT TOPICAL at 16:08

## 2015-12-25 RX ADMIN — NITROGLYCERIN SCH IN: 20 OINTMENT TOPICAL at 04:45

## 2015-12-25 RX ADMIN — NITROGLYCERIN SCH IN: 20 OINTMENT TOPICAL at 21:54

## 2015-12-25 NOTE — RAD
Chest dated 12/25/2015. 



History: Intubated. 



Single AP semi-erect portable view chest performed and compared with 

prior study 12/24/2015. 



Findings: In situ ETT, tip of which lies approximately 3.5 cm above 

ru.  Right-sided PICC line with tip in the SVC.  NGT is also 

present tip of which has not been included on this film though distal 

aspect does lie well below EG junction. 



Heart is enlarged. Increased central pulmonary vasculature in a 

perihilar distribution with what may represent small left and tiny 

right-sided effusions. Small bilateral lower lobe alveolar-type 

infiltrates are also suspected. 



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 

present.  DJD right shoulder. 



Impression: Support lines and tubes as above.  Cardiomegaly. 

Increased at central the pulmonary vasculature and perihilar 

distribution suggesting mild pulmonary edema/CHF.  Suspect small 

bilateral lower lobe alveolar-type infiltrates and small effusions.

## 2015-12-25 NOTE — CP.PCM.PN
Subjective





- Subjective


Subjective: 


pt in bed on intermittent sedation. amiodarone d/c w/ monitor showing nsr in 

80s.  pt opens eyes and blinks/nods.  still unable to maintain own airway and 

has excessive secretions making extubation difficult.  b/w, vs, cxr noted





Review of Systems





- Review of Systems


Systems not reviewed;Unavailable: Intubated





- Constitutional


Constitutional: UN





- EENT


Eyes: UNREMARKABLE


Ears: UNREMARKABLE


Nose/Mouth/Throat: UNREMARKABLE





- Cardiovascular


Cardiovascular: UNREMARKABLE





- Respiratory


Respiratory: As Per HPI, Cough, Chest Congestion, Excessive Mucous Production





- Gastrointestinal


Gastrointestinal: UNREMARKABLE





- Genitourinary


Genitourinary: UNREMARKABLE





- Reproductive: Male


Reproductive:Male: UNREMARKABLE





- Musculoskeletal


Musculoskeletal: UNREMARKABLE





- Integumentary


Integumentary: UNREMARKABLE





- Neurological


Neurological: UNREMARKABLE





- Psychiatric


Psychiatric: UNREMARKABLE





- Endocrine


Endocrine: UNREMARKABLE





- Hematologic/Lymphatic


Hematologic: UNREMARKABLE





Objective





- Vital Signs/Intake and Output


Vital Signs (last 24 hours): 


 Vital Signs - 24 hr











  12/24/15 12/24/15 12/24/15





  10:00 11:00 12:00


 


Temperature   98.8 F


 


Pulse Rate 88 89 171 H


 


Respiratory 20 15 21





Rate   


 


Blood Pressure 134/90 108/69 93/73 L


 


O2 Sat by Pulse 100 100 100





Oximetry   














  12/24/15 12/24/15 12/24/15





  12:11 13:00 14:00


 


Temperature   


 


Pulse Rate 174 H 95 H 95 H


 


Respiratory  18 20





Rate   


 


Blood Pressure 120/84 118/76 132/88


 


O2 Sat by Pulse  100 100





Oximetry   














  12/24/15 12/24/15 12/24/15





  14:52 15:00 16:00


 


Temperature   


 


Pulse Rate 156 H 100 H 93 H


 


Respiratory  18 16





Rate   


 


Blood Pressure 121/68 129/76 120/72


 


O2 Sat by Pulse  100 99





Oximetry   














  12/24/15 12/24/15 12/24/15





  17:00 18:00 19:00


 


Temperature   


 


Pulse Rate 89 89 94 H


 


Respiratory 15 16 21





Rate   


 


Blood Pressure 115/70 126/84 136/83


 


O2 Sat by Pulse 99 98 100





Oximetry   














  12/24/15 12/24/15 12/24/15





  20:00 21:00 21:12


 


Temperature 98.2 F  


 


Pulse Rate 92 H 91 H 93 H


 


Respiratory 16 19 





Rate   


 


Blood Pressure 123/79 131/75 131/75


 


O2 Sat by Pulse 100 100 





Oximetry   














  12/24/15 12/24/15 12/25/15





  22:00 23:00 00:00


 


Temperature   98.1 F


 


Pulse Rate 91 H 95 H 87


 


Respiratory 22 23 100 H





Rate   


 


Blood Pressure 135/86 137/85 140/70


 


O2 Sat by Pulse 100 99 21 L





Oximetry   














  12/25/15 12/25/15 12/25/15





  01:00 02:00 03:00


 


Temperature   


 


Pulse Rate 92 H 95 H 88


 


Respiratory 16 24 14





Rate   


 


Blood Pressure 131/77 146/88 132/78


 


O2 Sat by Pulse 100 100 100





Oximetry   














  12/25/15 12/25/15 12/25/15





  04:00 05:00 06:00


 


Temperature 98.6 F  


 


Pulse Rate 86 86 82


 


Respiratory 19 16 14





Rate   


 


Blood Pressure 114/66 131/85 121/70


 


O2 Sat by Pulse 100 100 100





Oximetry   














  12/25/15 12/25/15





  07:00 08:00


 


Temperature  98.4 F


 


Pulse Rate 87 85


 


Respiratory 19 14





Rate  


 


Blood Pressure 130/71 127/75


 


O2 Sat by Pulse 100 100





Oximetry  











Intake and Output (last 12 hours): 


 Intake & Output











 12/24/15 12/25/15 12/25/15





 18:59 06:59 18:59


 


Intake Total 1476 1684 154


 


Output Total  550 


 


Balance 1476 1134 154


 


Intake:   


 


   414 


 


  Intake, Piggyback 100 100 34


 


  Tube Feeding 720 720 120


 


  Free Water Flush 450 450 


 


Output:   


 


  Urine  550 


 


    Urethral (Mcgee)  550 


 


Other:   


 


  # Bowel Movements  1 














- Medications


Medications: 


 Current Medications





Acetaminophen (Tylenol 650mg/20.3ml Solution Ud)  650 mg PO Q6 PRN


   PRN Reason: Fever


   Last Admin: 12/18/15 02:28 Dose:  650 mg


Acetaminophen (Tylenol 650 Mg Supp)  650 mg MI Q6 PRN


   PRN Reason: fevers


   Last Admin: 12/14/15 06:53 Dose:  650 mg


Albuterol/Ipratropium (Duoneb 3 Mg/0.5 Mg (3 Ml) Ud)  3 ml INH RQ6 [facility]


   Last Admin: 12/25/15 08:29 Dose:  3 ml


Aspirin (Ecotrin)  81 mg PO DAILY [facility]


   Last Admin: 12/25/15 08:15 Dose:  81 mg


Enalapril Maleate (Vasotec)  10 mg PO DAILY [facility]


   Last Admin: 12/25/15 08:20 Dose:  10 mg


Enoxaparin Sodium (Lovenox)  40 mg SC DAILY [facility]


   Last Admin: 12/25/15 08:19 Dose:  40 mg


Famotidine (Pepcid)  20 mg GT BID [facility]


   Last Admin: 12/25/15 08:20 Dose:  20 mg


Cefepime HCl 2 gm/ Sodium (Chloride)  100 mls @ 100 mls/hr IVPB Q12 [facility]


   Last Admin: 12/25/15 08:19 Dose:  100 mls/hr


Amiodarone HCl 450 mg/ (Dextrose)  259 mls @ 34.53 mls/hr IVPB .Q7H31M MAGDALENA; 1 MG

/MIN


   PRN Reason: Protocol


   Last Admin: 12/24/15 21:12 Dose:  17.3 mls/hr


Insulin Human Regular (Humulin R)  0 units SC ACHS [facility]


   PRN Reason: Protocol


   Last Admin: 12/25/15 08:18 Dose:  Not Given


Levetiracetam (Keppra)  500 mg PO BID [facility]


   Last Admin: 12/25/15 08:19 Dose:  500 mg


Metoprolol Tartrate (Lopressor)  100 mg PO Q12 [facility]


   Last Admin: 12/25/15 08:18 Dose:  100 mg


Multivitamins/Vitamin C (Multi-Delyn Liquid)  5 ml PO DAILY [facility]


   Last Admin: 12/25/15 08:20 Dose:  5 ml


Nitroglycerin (Nitro-Bid 2% Oint)  2 in TOP Q6 [facility]


   Last Admin: 12/25/15 04:45 Dose:  2 in











- Labs


Labs (last 24 hours): 


 Laboratory Results - last 24 hr











  12/24/15 12/25/15 12/25/15





  23:24 04:30 04:50


 


WBC   11.1 H 


 


RBC   3.06 L 


 


Hgb   9.8 L 


 


Hct   31.6 L 


 


MCV   103.4 H 


 


MCH   31.9 H 


 


MCHC   30.9 L 


 


RDW   13.4 


 


Plt Count   532 H 


 


pCO2    41


 


pO2    128 H


 


HCO3    28.8 H


 


ABG pH    7.46 H


 


ABG Total CO2    30.5 H


 


ABG O2 Saturation    98.6 H


 


ABG O2 Content    13.6 L


 


ABG Base Excess    4.9 H


 


ABG Hemoglobin    9.8 L


 


ABG Carboxyhemoglobin    0.9


 


POC ABG HHb (Measured)    1.4


 


ABG Methemoglobin    0.8


 


ABG O2 Capacity    13.8 L


 


Chu Test    Yes


 


A-a O2 Difference    177.0


 


Hgb O2 Saturation    96.8


 


FiO2    50.0


 


Crit Value Called To    Marcelina bass


 


Crit Value Read Back    Yes


 


Blood Gas Notified Time    457


 


Sodium   142 


 


Potassium   4.0 


 


Chloride   106 


 


Carbon Dioxide   29 


 


Anion Gap   12 


 


BUN   21 H 


 


Creatinine   0.9 


 


Est GFR ( Amer)   > 60 


 


Est GFR (Non-Af Amer)   > 60 


 


POC Glucose (mg/dL)  92  


 


Random Glucose   107 


 


Calcium   9.2 














  12/25/15





  08:18


 


WBC 


 


RBC 


 


Hgb 


 


Hct 


 


MCV 


 


MCH 


 


MCHC 


 


RDW 


 


Plt Count 


 


pCO2 


 


pO2 


 


HCO3 


 


ABG pH 


 


ABG Total CO2 


 


ABG O2 Saturation 


 


ABG O2 Content 


 


ABG Base Excess 


 


ABG Hemoglobin 


 


ABG Carboxyhemoglobin 


 


POC ABG HHb (Measured) 


 


ABG Methemoglobin 


 


ABG O2 Capacity 


 


Chu Test 


 


A-a O2 Difference 


 


Hgb O2 Saturation 


 


FiO2 


 


Crit Value Called To 


 


Crit Value Read Back 


 


Blood Gas Notified Time 


 


Sodium 


 


Potassium 


 


Chloride 


 


Carbon Dioxide 


 


Anion Gap 


 


BUN 


 


Creatinine 


 


Est GFR ( Amer) 


 


Est GFR (Non-Af Amer) 


 


POC Glucose (mg/dL)  99


 


Random Glucose 


 


Calcium 














- Constitutional


Appears: Non-toxic, No Acute Distress, Chronically Ill





- Head Exam


Head Exam: ATRAUMATIC, NORMAL INSPECTION, NORMOCEPHALIC





- Eye Exam


Eye Exam: EOMI





- Respiratory Exam


Respiratory Exam: Rhonchi


Additional comments: 


vented





- Cardiovascular Exam


Cardiovascular Exam: REGULAR RHYTHM, RRR





- GI/Abdominal Exam


GI & Abdominal Exam: Normal Bowel Sounds, Soft, Unremarkable





- Extremities Exam


Extremities exam: full ROM, normal capillary refill, normal inspection, pedal 

pulses present





- Back Exam


Back exam: FULL ROM





- Neurological Exam


Neurological exam: Alert, CN II-XII Intact, Normal Gait, Oriented x3, Reflexes 

Normal





- Psychiatric Exam


Psychiatric exam: Normal Affect, Normal Mood





- Skin


Skin Exam: Dry, Intact, Normal Color, Warm





Assessment/Plan


(1) Cardiac arrest


Current Visit: Yes   Status: Acute


Comment: nsr in 80-90s


cardio on board and pt is w/o further episodes of VT


no cardio interventions planned at present.


pt only appears to become tachycardic when agitated


pt developed rapid afib yesterday but was broken w/ lopressor iv








(2) DVT prophylaxis


Current Visit: Yes   Status: Acute


Comment: lovenox








(3) Scrotal edema


Current Visit: Yes   Status: Acute


Comment: scrotal us-noted


urology consult-dr cacace to see pt when stable








(4) History of seizure


Current Visit: Yes   Status: Acute


Comment: neuro consult


?? eeg


started on keppra








(5) Diabetes mellitus with hyperglycemia


Current Visit: Yes   Status: Acute


Comment: riss per protocol








(6) Agitation


Current Visit: Yes   Status: Acute


Comment: propofol d/c


ativan prn








(7) UTI (urinary tract infection)


Current Visit: Yes   Status: Acute


Comment: cont anbx


mcgee cath


uro consult pending








(8) Aspiration pneumonia


Current Visit: Yes   Status: Acute


Comment: cipro


cont IV Cefapime


BD NEBS Q 6H PRN


f/u repeat c/s


psudomonas in trach asp


cxr improving


likely will need trach placement for optimal care











- Assessment and Plan (Free Text)


Assessment: 


pending guardian for long term procedures-trach, peg, picc and for placement

## 2015-12-25 NOTE — CP.CCUPN
CCU Subjective





- Physician Review


Subjective (Free Text): 





***INTENSIVIST PROGRESS NOTE***


Patient examined, interim events reviewed:





Tacyarryhthmias now controlled again, Amiodarone drip stopped after approx 24H, 

had converted back to NSR yesterday afternoon, no further agitative episodes 

noted, breathing 19 on AC 10 Ve= 9.5, PPP= 19, SPO2 100% on 50% and PEEP 5. 

Afebrile, no temp spikes overnight, HR 85 sinus, /75. 24H I/Os = 3160/

550 ml.








EXAM-


HEENT: no icterus, no nystagmus


NECK: no visible JVD, supple, carotids equal upstroke bilat/no bruits


CHEST: decreased BS bases, no wheezes audible


HEART:  regular, distant, S1S2, no murmur audible, no rubs.


ABD:  soft, no increased distention, no focal tenderness,  no HSM. BS hypoactive

, 


EXT:    +UE bilat edema decreasing, no peripheral/ digital cyanosis, no 

palpable cords, distal pulses intact and symmetrical, SCDs on bilat. 


NEURO:  moves arms mnore than legs, +tone.


SKIN:   no rashes





LABS


WBC= 11.1


HGB= 9.8


PLTs = 532K





Na= 142


K=  4.0


HCO3= 29


BUN/Cr=  21/0.9


BS= 107


7.46/41/128


CXR:  ETT position OK above ru, improved interstitial changes on the R, 

Left hemiiauaphragm visible today (My interp)





Assessment: 


1.   Cardiac Arrest with resuscitation from VT. 


2.   Post-Resuscitation Therapeutic Hypothermia-completed


3.   Anoxic Encephalopathy


4.     Aspiration Pneumonia


5.    H/o Seizure Disorder with recurrence








PLAN:


1.   Day # 18 on MV support, off continuous sedation. Continue MV weans with 

SBTs as tolerated, but would not extubate yet unless he demonstrates a good 

cough reflex and wakefulness improves further. He may still need trach and he 

is mentally incompetent and cannot provide consent for himself. Sisters have 

not been contacted and have not appeared yet. Attainment of Guardianship should 

proceed as feasible. 


2.   Ongoing Cefepime coverage against Kleb and Psuedomonas isolates in sputum.


3.   Lopressor: increased to 100 mg Q12H, will consider increasing to 150 mg 

Q12H if BP tolerates. Recurrent tachyarrythmias may be triggered by agitation. 

Amiodarone drip stopped for now, will try to avoid using both Amio maintenance 

and BBs together.


4.   Needs PT eval for bedside PROM.


5.    Previous orders for DNR cancelled by me.

















CCU Objective





- Vital Signs / Intake & Output


Vital Signs (Last 4 hours): 


Vital Signs











  Temp Pulse Resp BP Pulse Ox


 


 12/25/15 08:00  98.4 F  85  14  127/75  100


 


 12/25/15 07:00   87  19  130/71  100


 


 12/25/15 06:00   82  14  121/70  100











Intake and Output (Last 8hrs): 


 Intake & Output











 12/24/15 12/25/15 12/25/15





 22:59 06:59 14:59


 


Intake Total 1218 1056 154


 


Output Total  550 


 


Balance 1218 506 154


 


Intake:   


 


   276 


 


  Intake, Piggyback 100  34


 


  Tube Feeding 480 480 120


 


  Free Water Flush 300 300 


 


Output:   


 


  Urine  550 


 


    Urethral (Lua)  550 


 


Other:   


 


  # Bowel Movements 1 1 














- Physical Exam


Head: Positive for: Atraumatic, Normocephalic


Pupils: Positive for: PERRL


Extroacular Muscles: Positive for: EOMI


Conjunctiva: Positive for: Normal.  Negative for: Injected, Icteric


Neck: Positive for: Normal Range of Motion, Trachea Midline.  Negative for: 

Meningeal Signs, MIDLINE TENDERNESS, Paraspinal Tenderness, JVD, Lymphadenopathy

, Bruit, Other


Respiratory/Chest: Positive for: Clear to Auscultation, Good Air Exchange.  

Negative for: Respiratory Distress, Accessory Muscle Use, Wheezes, Decreased 

Breath Sounds, Rales, Retracting, Rhonchi, Tachypneic, Tender to Palpation, 

Other


Cardiovascular: Positive for: Normal S1, S2, Irregular Rhythm, Peripheal Pulses 

Present.  Negative for: Murmurs


Abdomen: Positive for: Normal Bowel Sounds.  Negative for: Tenderness, 

Distention, Peritoneal Signs


Upper Extremity: Positive for: Normal Inspection.  Negative for: Cyanosis, Edema


Lower Extremity: Positive for: Normal Inspection.  Negative for: Edema, CALF 

TENDERNESS





- Medications


Active Medications: 


Active Medications











Generic Name Dose Route Start Last Admin





  Trade Name Freq  PRN Reason Stop Dose Admin


 


Acetaminophen  650 mg 12/13/15 13:21 12/18/15 02:28





  Tylenol 650mg/20.3ml Solution Ud  PO   650 mg





  Q6 PRN   Administration





  Fever   


 


Acetaminophen  650 mg 12/13/15 23:57 12/14/15 06:53





  Tylenol 650 Mg Supp  MD   650 mg





  Q6 PRN   Administration





  fevers   


 


Albuterol/Ipratropium  3 ml 12/18/15 08:00 12/25/15 08:29





  Duoneb 3 Mg/0.5 Mg (3 Ml) Ud  INH   3 ml





  RQ6 MAGDALENA   Administration


 


Aspirin  81 mg 12/13/15 13:30 12/25/15 08:15





  Ecotrin  PO   81 mg





  DAILY MAGDALENA   Administration


 


Enalapril Maleate  10 mg 12/10/15 11:15 12/25/15 08:20





  Vasotec  PO   10 mg





  DAILY MAGDALENA   Administration


 


Enoxaparin Sodium  40 mg 12/23/15 10:15 12/25/15 08:19





  Lovenox  SC   40 mg





  DAILY MAGDALENA   Administration


 


Famotidine  20 mg 12/10/15 17:00 12/25/15 08:20





  Pepcid  GT   20 mg





  BID MAGDALENA   Administration


 


Cefepime HCl 2 gm/ Sodium  100 mls @ 100 mls/hr 12/21/15 21:00 12/25/15 08:19





  Chloride  IVPB   100 mls/hr





  Q12 MAGDALENA   Administration


 


Amiodarone HCl 450 mg/  259 mls @ 34.53 mls/hr 12/24/15 12:35 12/24/15 21:12





  Dextrose  IVPB   17.3 mls/hr





  .Q7H31M MAGDALENA   Administration





  Protocol   





  1 MG/MIN   


 


Insulin Human Regular  0 units 12/10/15 22:00 12/25/15 08:18





  Humulin R  SC   Not Given





  ACHS MAGDALENA   





  Protocol   


 


Levetiracetam  500 mg 12/18/15 09:00 12/25/15 08:19





  Keppra  PO   500 mg





  BID MAGDALENA   Administration


 


Metoprolol Tartrate  100 mg 12/24/15 09:00 12/25/15 08:18





  Lopressor  PO   100 mg





  Q12 MAGDALENA   Administration


 


Multivitamins/Vitamin C  5 ml 12/10/15 09:00 12/25/15 08:20





  Multi-Delyn Liquid  PO   5 ml





  DAILY MAGDALENA   Administration


 


Nitroglycerin  2 in 12/10/15 16:00 12/25/15 04:45





  Nitro-Bid 2% Oint  TOP   2 in





  Q6 MAGDALENA   Administration














- Patient Studies


Lab Studies: 


 Lab Studies











  12/25/15 12/25/15 12/25/15 Range/Units





  08:18 04:50 04:30 


 


WBC    11.1 H  (4.8-10.8)  K/uL


 


RBC    3.06 L  (4.40-5.90)  Mil/uL


 


Hgb    9.8 L  (12.0-18.0)  g/dL


 


Hct    31.6 L  (35.0-51.0)  %


 


MCV    103.4 H  (80.0-94.0)  fl


 


MCH    31.9 H  (27.0-31.0)  pg


 


MCHC    30.9 L  (33.0-37.0)  g/dL


 


RDW    13.4  (11.5-14.5)  %


 


Plt Count    532 H  (130-400)  K/uL


 


pCO2   41   (35-45)  mm/Hg


 


pO2   128 H   ()  mm/Hg


 


HCO3   28.8 H   (21-28)  mmol/L


 


ABG pH   7.46 H   (7.35-7.45)  


 


ABG Total CO2   30.5 H   (22-28)  mmol/L


 


ABG O2 Saturation   98.6 H   (95-98)  %


 


ABG O2 Content   13.6 L   (15-23)  ML/dL


 


ABG Base Excess   4.9 H   (-2.0-3.0)  mmol/L


 


ABG Hemoglobin   9.8 L   (11.7-17.4)  g/dL


 


ABG Carboxyhemoglobin   0.9   (0.5-1.5)  %


 


POC ABG HHb (Measured)   1.4   (0.0-5.0)  %


 


ABG Methemoglobin   0.8   (0.0-3.0)  %


 


ABG O2 Capacity   13.8 L   (16-24)  mL/dL


 


Chu Test   Yes   


 


A-a O2 Difference   177.0   mm/Hg


 


Hgb O2 Saturation   96.8   (95.0-98.0)  %


 


FiO2   50.0   %


 


Crit Value Called To   Marcelina bass   


 


Crit Value Read Back   Yes   


 


Blood Gas Notified Time   457   


 


Sodium    142  (132-148)  mmol/l


 


Potassium    4.0  (3.6-5.0)  MMOL/L


 


Chloride    106  ()  mmol/L


 


Carbon Dioxide    29  (22-30)  mmol/L


 


Anion Gap    12  (10-20)  


 


BUN    21 H  (9-20)  mg/dl


 


Creatinine    0.9  (0.8-1.5)  mg/dL


 


Est GFR (African Amer)    > 60  


 


Est GFR (Non-Af Amer)    > 60  


 


POC Glucose (mg/dL)  99    ()  mg/dL


 


Random Glucose    107  ()  mg/dL


 


Calcium    9.2  (8.4-10.2)  mg/dL














  12/24/15 Range/Units





  23:24 


 


WBC   (4.8-10.8)  K/uL


 


RBC   (4.40-5.90)  Mil/uL


 


Hgb   (12.0-18.0)  g/dL


 


Hct   (35.0-51.0)  %


 


MCV   (80.0-94.0)  fl


 


MCH   (27.0-31.0)  pg


 


MCHC   (33.0-37.0)  g/dL


 


RDW   (11.5-14.5)  %


 


Plt Count   (130-400)  K/uL


 


pCO2   (35-45)  mm/Hg


 


pO2   ()  mm/Hg


 


HCO3   (21-28)  mmol/L


 


ABG pH   (7.35-7.45)  


 


ABG Total CO2   (22-28)  mmol/L


 


ABG O2 Saturation   (95-98)  %


 


ABG O2 Content   (15-23)  ML/dL


 


ABG Base Excess   (-2.0-3.0)  mmol/L


 


ABG Hemoglobin   (11.7-17.4)  g/dL


 


ABG Carboxyhemoglobin   (0.5-1.5)  %


 


POC ABG HHb (Measured)   (0.0-5.0)  %


 


ABG Methemoglobin   (0.0-3.0)  %


 


ABG O2 Capacity   (16-24)  mL/dL


 


Chu Test   


 


A-a O2 Difference   mm/Hg


 


Hgb O2 Saturation   (95.0-98.0)  %


 


FiO2   %


 


Crit Value Called To   


 


Crit Value Read Back   


 


Blood Gas Notified Time   


 


Sodium   (132-148)  mmol/l


 


Potassium   (3.6-5.0)  MMOL/L


 


Chloride   ()  mmol/L


 


Carbon Dioxide   (22-30)  mmol/L


 


Anion Gap   (10-20)  


 


BUN   (9-20)  mg/dl


 


Creatinine   (0.8-1.5)  mg/dL


 


Est GFR (African Amer)   


 


Est GFR (Non-Af Amer)   


 


POC Glucose (mg/dL)  92  ()  mg/dL


 


Random Glucose   ()  mg/dL


 


Calcium   (8.4-10.2)  mg/dL








 Laboratory Results - last 24 hr











  12/24/15 12/25/15 12/25/15





  23:24 04:30 04:50


 


WBC   11.1 H 


 


RBC   3.06 L 


 


Hgb   9.8 L 


 


Hct   31.6 L 


 


MCV   103.4 H 


 


MCH   31.9 H 


 


MCHC   30.9 L 


 


RDW   13.4 


 


Plt Count   532 H 


 


pCO2    41


 


pO2    128 H


 


HCO3    28.8 H


 


ABG pH    7.46 H


 


ABG Total CO2    30.5 H


 


ABG O2 Saturation    98.6 H


 


ABG O2 Content    13.6 L


 


ABG Base Excess    4.9 H


 


ABG Hemoglobin    9.8 L


 


ABG Carboxyhemoglobin    0.9


 


POC ABG HHb (Measured)    1.4


 


ABG Methemoglobin    0.8


 


ABG O2 Capacity    13.8 L


 


Chu Test    Yes


 


A-a O2 Difference    177.0


 


Hgb O2 Saturation    96.8


 


FiO2    50.0


 


Crit Value Called To    Marcelina bass


 


Crit Value Read Back    Yes


 


Blood Gas Notified Time    457


 


Sodium   142 


 


Potassium   4.0 


 


Chloride   106 


 


Carbon Dioxide   29 


 


Anion Gap   12 


 


BUN   21 H 


 


Creatinine   0.9 


 


Est GFR ( Amer)   > 60 


 


Est GFR (Non-Af Amer)   > 60 


 


POC Glucose (mg/dL)  92  


 


Random Glucose   107 


 


Calcium   9.2 














  12/25/15





  08:18


 


WBC 


 


RBC 


 


Hgb 


 


Hct 


 


MCV 


 


MCH 


 


MCHC 


 


RDW 


 


Plt Count 


 


pCO2 


 


pO2 


 


HCO3 


 


ABG pH 


 


ABG Total CO2 


 


ABG O2 Saturation 


 


ABG O2 Content 


 


ABG Base Excess 


 


ABG Hemoglobin 


 


ABG Carboxyhemoglobin 


 


POC ABG HHb (Measured) 


 


ABG Methemoglobin 


 


ABG O2 Capacity 


 


Chu Test 


 


A-a O2 Difference 


 


Hgb O2 Saturation 


 


FiO2 


 


Crit Value Called To 


 


Crit Value Read Back 


 


Blood Gas Notified Time 


 


Sodium 


 


Potassium 


 


Chloride 


 


Carbon Dioxide 


 


Anion Gap 


 


BUN 


 


Creatinine 


 


Est GFR ( Amer) 


 


Est GFR (Non-Af Amer) 


 


POC Glucose (mg/dL)  99


 


Random Glucose 


 


Calcium 











Fingerstick Blood Sugar Results: 99

## 2015-12-26 LAB
ARTERIAL BLOOD GAS HEMOGLOBIN: 10.3 G/DL (ref 11.7–17.4)
ARTERIAL BLOOD GAS O2 SAT: 98.6 % (ref 95–98)
ARTERIAL BLOOD GAS PCO2: 42 MM/HG (ref 35–45)
ARTERIAL BLOOD GAS TCO2: 31.2 MMOL/L (ref 22–28)
ARTERIAL PATENCY WRIST A: YES
BUN SERPL-MCNC: 18 MG/DL (ref 9–20)
CALCIUM SERPL-MCNC: 9.1 MG/DL (ref 8.4–10.2)
ERYTHROCYTE [DISTWIDTH] IN BLOOD BY AUTOMATED COUNT: 13.1 % (ref 11.5–14.5)
GFR NON-AFRICAN AMERICAN: > 60
HCO3 BLDA-SCNC: 29.2 MMOL/L (ref 21–28)
HGB BLD-MCNC: 9.8 G/DL (ref 12–18)
INHALED O2 CONCENTRATION: 50 %
MCH RBC QN AUTO: 32.2 PG (ref 27–31)
MCHC RBC AUTO-ENTMCNC: 32 G/DL (ref 33–37)
MCV RBC AUTO: 100.6 FL (ref 80–94)
O2 CAP BLDA-SCNC: 14.5 ML/DL (ref 16–24)
O2 CT BLDA-SCNC: 14.3 ML/DL (ref 15–23)
PH BLDA: 7.46 [PH] (ref 7.35–7.45)
PLATELET # BLD: 546 K/UL (ref 130–400)
PO2 BLDA: 139 MM/HG (ref 80–100)
RBC # BLD AUTO: 3.04 MIL/UL (ref 4.4–5.9)
WBC # BLD AUTO: 12.5 K/UL (ref 4.8–10.8)

## 2015-12-26 RX ADMIN — LEVETIRACETAM SCH MG: 100 SOLUTION ORAL at 08:22

## 2015-12-26 RX ADMIN — NITROGLYCERIN SCH IN: 20 OINTMENT TOPICAL at 22:29

## 2015-12-26 RX ADMIN — LEVETIRACETAM SCH MG: 100 SOLUTION ORAL at 16:18

## 2015-12-26 RX ADMIN — IPRATROPIUM BROMIDE AND ALBUTEROL SULFATE SCH ML: .5; 3 SOLUTION RESPIRATORY (INHALATION) at 01:00

## 2015-12-26 RX ADMIN — ENOXAPARIN SODIUM SCH MG: 40 INJECTION SUBCUTANEOUS at 08:22

## 2015-12-26 RX ADMIN — IPRATROPIUM BROMIDE AND ALBUTEROL SULFATE SCH ML: .5; 3 SOLUTION RESPIRATORY (INHALATION) at 19:02

## 2015-12-26 RX ADMIN — ACETAMINOPHEN PRN MG: 160 SOLUTION ORAL at 16:19

## 2015-12-26 RX ADMIN — IPRATROPIUM BROMIDE AND ALBUTEROL SULFATE SCH ML: .5; 3 SOLUTION RESPIRATORY (INHALATION) at 13:51

## 2015-12-26 RX ADMIN — NITROGLYCERIN SCH IN: 20 OINTMENT TOPICAL at 16:18

## 2015-12-26 RX ADMIN — NITROGLYCERIN SCH IN: 20 OINTMENT TOPICAL at 09:19

## 2015-12-26 RX ADMIN — NITROGLYCERIN SCH IN: 20 OINTMENT TOPICAL at 04:45

## 2015-12-26 RX ADMIN — Medication SCH ML: at 08:23

## 2015-12-26 RX ADMIN — IPRATROPIUM BROMIDE AND ALBUTEROL SULFATE SCH ML: .5; 3 SOLUTION RESPIRATORY (INHALATION) at 07:43

## 2015-12-26 NOTE — CP.PCM.PN
Subjective





- Subjective


Subjective: 


Awake, on vent, multiple attempts to wean him off from vent has not been 

successful, now on AC and needs tracheostomy. 








Review of Systems





- Review of Systems


Systems not reviewed;Unavailable: Intubated





- Constitutional


Constitutional: absent: Fever





Objective





- Vital Signs/Intake and Output


Vital Signs (last 24 hours): 


 Vital Signs - 24 hr











  12/25/15 12/25/15 12/25/15





  15:00 16:00 17:00


 


Temperature  99.2 F 


 


Pulse Rate 87 88 90


 


Respiratory 18 16 18





Rate   


 


Blood Pressure 140/91 H 145/89 131/81


 


O2 Sat by Pulse 100 100 99





Oximetry   














  12/25/15 12/25/15 12/25/15





  18:00 20:00 22:00


 


Temperature  98.5 F 


 


Pulse Rate 91 H 95 H 93 H


 


Respiratory 16 19 21





Rate   


 


Blood Pressure 132/80 133/71 146/91 H


 


O2 Sat by Pulse 100 100 100





Oximetry   














  12/26/15 12/26/15 12/26/15





  00:00 02:00 04:00


 


Temperature 98.1 F  98.1 F


 


Pulse Rate 94 H 95 H 92 H


 


Respiratory 18 18 13





Rate   


 


Blood Pressure 151/98 H 155/97 H 147/85


 


O2 Sat by Pulse 100 100 100





Oximetry   














  12/26/15 12/26/15 12/26/15





  06:00 08:00 09:00


 


Temperature  98.8 F 


 


Pulse Rate 89 94 H 98 H


 


Respiratory 16 15 17





Rate   


 


Blood Pressure 143/89 150/93 H 124/87


 


O2 Sat by Pulse 100 100 100





Oximetry   














  12/26/15 12/26/15 12/26/15





  10:00 11:00 12:00


 


Temperature   98.7 F


 


Pulse Rate 87 88 90


 


Respiratory 20 19 18





Rate   


 


Blood Pressure 117/85 128/84 138/97 H


 


O2 Sat by Pulse 100 100 100





Oximetry   











Intake and Output (last 12 hours): 


 Intake & Output











 12/25/15 12/26/15 12/26/15





 18:59 06:59 18:59


 


Intake Total 1454 1270 785


 


Output Total 650 600 


 


Balance 804 670 785


 


Intake:   


 


  IV   25


 


  Intake, Piggyback 134 100 100


 


  Tube Feeding 720 720 360


 


  Free Water Flush 600 450 300


 


Output:   


 


  Urine 650 600 


 


    Urethral (Lua) 650 600 


 


Other:   


 


  # Bowel Movements 1 2 














- Medications


Medications: 


 Current Medications





Acetaminophen (Tylenol 650mg/20.3ml Solution Ud)  650 mg PO Q6 PRN


   PRN Reason: Fever


   Last Admin: 12/18/15 02:28 Dose:  650 mg


Acetaminophen (Tylenol 650 Mg Supp)  650 mg TN Q6 PRN


   PRN Reason: fevers


   Last Admin: 12/14/15 06:53 Dose:  650 mg


Albuterol/Ipratropium (Duoneb 3 Mg/0.5 Mg (3 Ml) Ud)  3 ml INH RQ6 [facility]


   Last Admin: 12/26/15 13:51 Dose:  3 ml


Aspirin (Ecotrin)  81 mg PO DAILY [facility]


   Last Admin: 12/26/15 08:22 Dose:  81 mg


Enalapril Maleate (Vasotec)  10 mg PO DAILY [facility]


   Last Admin: 12/26/15 08:24 Dose:  10 mg


Famotidine (Pepcid)  20 mg GT BID [facility]


   Last Admin: 12/26/15 08:24 Dose:  20 mg


Cefepime HCl 2 gm/ Sodium (Chloride)  100 mls @ 100 mls/hr IVPB Q12 [facility]


   Last Admin: 12/26/15 08:23 Dose:  100 mls/hr


Insulin Human Regular (Humulin R)  0 units SC ACHS MAGDALENA


   PRN Reason: Protocol


   Last Admin: 12/26/15 11:33 Dose:  Not Given


Levetiracetam (Keppra)  500 mg PO BID [facility]


   Last Admin: 12/26/15 08:22 Dose:  500 mg


Metoprolol Tartrate (Lopressor)  100 mg PO Q12 [facility]


   Last Admin: 12/26/15 08:22 Dose:  100 mg


Multivitamins/Vitamin C (Multi-Delyn Liquid)  5 ml PO DAILY [facility]


   Last Admin: 12/26/15 08:23 Dose:  5 ml


Nitroglycerin (Nitro-Bid 2% Oint)  2 in TOP Q6 [facility]


   Last Admin: 12/26/15 09:19 Dose:  2 in











- Labs


Labs (last 24 hours): 


 Laboratory Results - last 24 hr











  12/25/15 12/25/15 12/26/15





  16:03 22:01 05:18


 


WBC   


 


RBC   


 


Hgb   


 


Hct   


 


MCV   


 


MCH   


 


MCHC   


 


RDW   


 


Plt Count   


 


pCO2    42


 


pO2    139 H


 


HCO3    29.2 H


 


ABG pH    7.46 H


 


ABG Total CO2    31.2 H


 


ABG O2 Saturation    98.6 H


 


ABG O2 Content    14.3 L


 


ABG Base Excess    5.5 H


 


ABG Hemoglobin    10.3 L


 


ABG Carboxyhemoglobin    1.1


 


POC ABG HHb (Measured)    1.4


 


ABG Methemoglobin    0.7


 


ABG O2 Capacity    14.5 L


 


Chu Test    Yes


 


A-a O2 Difference    165.0


 


Hgb O2 Saturation    96.8


 


FiO2    50.0


 


Sodium   


 


Potassium   


 


Chloride   


 


Carbon Dioxide   


 


Anion Gap   


 


BUN   


 


Creatinine   


 


Est GFR ( Amer)   


 


Est GFR (Non-Af Amer)   


 


POC Glucose (mg/dL)  82  102 


 


Random Glucose   


 


Calcium   














  12/26/15 12/26/15 12/26/15





  05:30 07:11 11:30


 


WBC  12.5 H  


 


RBC  3.04 L  


 


Hgb  9.8 L  


 


Hct  30.6 L  


 


MCV  100.6 H D  


 


MCH  32.2 H  


 


MCHC  32.0 L  


 


RDW  13.1  


 


Plt Count  546 H  


 


pCO2   


 


pO2   


 


HCO3   


 


ABG pH   


 


ABG Total CO2   


 


ABG O2 Saturation   


 


ABG O2 Content   


 


ABG Base Excess   


 


ABG Hemoglobin   


 


ABG Carboxyhemoglobin   


 


POC ABG HHb (Measured)   


 


ABG Methemoglobin   


 


ABG O2 Capacity   


 


Chu Test   


 


A-a O2 Difference   


 


Hgb O2 Saturation   


 


FiO2   


 


Sodium  141  


 


Potassium  4.0  


 


Chloride  103  


 


Carbon Dioxide  29  


 


Anion Gap  12  


 


BUN  18  


 


Creatinine  0.8  


 


Est GFR ( Amer)  > 60  


 


Est GFR (Non-Af Amer)  > 60  


 


POC Glucose (mg/dL)   93  109


 


Random Glucose  99  


 


Calcium  9.1  














- Constitutional


Appears: Non-toxic, Cachectic, Chronically Ill





- Head Exam


Head Exam: NORMOCEPHALIC





- Eye Exam


Eye Exam: absent: Scleral icterus





- ENT Exam


ENT Exam: Mucous Membranes Dry





- Neck Exam


Neck exam: absent: Lymphadenopathy, Thyromegaly





- Respiratory Exam


Respiratory Exam: Decreased Breath Sounds, Clear to PA & Lateral





- Cardiovascular Exam


Cardiovascular Exam: REGULAR RHYTHM, +S1, +S2





- GI/Abdominal Exam


GI & Abdominal Exam: Normal Bowel Sounds, Soft.  absent: Tenderness





- Rectal Exam


Rectal Exam: Deferred





-  Exam


 Exam: NORMAL INSPECTION





- Extremities Exam


Extremities exam: pedal pulses present.  absent: pedal edema, tenderness





- Back Exam


Back exam: absent: CVA tenderness (L), CVA tenderness (R), paraspinal tenderness





- Neurological Exam


Neurological exam: Alert, Altered, CN II-XII Intact





Assessment/Plan


(1) Agitation


Current Visit: Yes   Status: Acute


Comment: propofol d/c


ativan prn








(2) Aspiration pneumonia


Current Visit: Yes   Status: Acute


Comment: cipro


cont IV Cefapime


BD NEBS Q 6H PRN


f/u repeat c/s


psudomonas in trach asp


cxr improving


likely will need trach placement for optimal care








(3) Cardiac arrest


Current Visit: Yes   Status: Acute


Comment: nsr in 80-90s


cardio on board and pt is w/o further episodes of VT


no cardio interventions planned at present.


pt only appears to become tachycardic when agitated


pt developed rapid afib yesterday but was broken w/ lopressor iv








(4) Diabetes mellitus with hyperglycemia


Current Visit: Yes   Status: Acute


Comment: riss per protocol








(5) History of seizure


Current Visit: Yes   Status: Acute


Comment: neuro consult


?? eeg


started on keppra








(6) NSTEMI (non-ST elevated myocardial infarction)


Current Visit: Yes   Status: Acute

## 2015-12-26 NOTE — RAD
Chest dated 12/26/2015. 



History: Intubated. 



On this single AP erect portable view of the chest performed and 

compared with prior study 12/25/2015. 



In situ ETT, tip of which lies approximately 3.75 cm above ru. 

NGT is present the tip of which overlies the left upper abdomen.  No 

change right sided PICC line. 



Slight increased central pulmonary vasculature with patchy 

alveolar-type infiltrates in the left mid to lower lung field. . 

Minor right basilar atelectasis and suspected small bilateral 

effusions.  



Impression: Support lines and tubes as above. 



Mild central pulmonary vascular congestive changes with alveolar-type 

infiltrates in the left mid to lower lung field and minor right 

basilar atelectasis. Suspect small bilateral effusions.

## 2015-12-26 NOTE — CP.PCM.PN
Subjective





- Subjective


Subjective: 


pt remains still unable to be weaned off vent as he often gets agitatied/rapid 

afeb when off of ac for prolonged periods of times.  vs, cxr, cosnultant notes 

appriciated. 





Review of Systems





- Review of Systems


Systems not reviewed;Unavailable: Intubated





- Constitutional


Constitutional: UN





- EENT


Eyes: UNREMARKABLE


Ears: UNREMARKABLE


Nose/Mouth/Throat: UNREMARKABLE





- Cardiovascular


Cardiovascular: UNREMARKABLE





- Respiratory


Respiratory: As Per HPI, Chest Congestion, Excessive Mucous Production





- Gastrointestinal


Gastrointestinal: UNREMARKABLE





- Genitourinary


Genitourinary: UNREMARKABLE





- Reproductive: Male


Reproductive:Male: UNREMARKABLE





- Musculoskeletal


Musculoskeletal: UNREMARKABLE





- Integumentary


Integumentary: UNREMARKABLE





- Neurological


Neurological: UNREMARKABLE





- Psychiatric


Psychiatric: UNREMARKABLE





- Endocrine


Endocrine: UNREMARKABLE





- Hematologic/Lymphatic


Hematologic: UNREMARKABLE





Objective





- Vital Signs/Intake and Output


Vital Signs (last 24 hours): 


 Vital Signs - 24 hr











  12/25/15 12/25/15 12/25/15





  13:00 14:00 15:00


 


Temperature   


 


Pulse Rate 85 91 H 87


 


Respiratory 16 20 18





Rate   


 


Blood Pressure 130/82 128/77 140/91 H


 


O2 Sat by Pulse 100 100 100





Oximetry   














  12/25/15 12/25/15 12/25/15





  16:00 17:00 18:00


 


Temperature 99.2 F  


 


Pulse Rate 88 90 91 H


 


Respiratory 16 18 16





Rate   


 


Blood Pressure 145/89 131/81 132/80


 


O2 Sat by Pulse 100 99 100





Oximetry   














  12/25/15 12/25/15 12/26/15





  20:00 22:00 00:00


 


Temperature 98.5 F  98.1 F


 


Pulse Rate 95 H 93 H 94 H


 


Respiratory 19 21 18





Rate   


 


Blood Pressure 133/71 146/91 H 151/98 H


 


O2 Sat by Pulse 100 100 100





Oximetry   














  12/26/15 12/26/15 12/26/15





  02:00 04:00 06:00


 


Temperature  98.1 F 


 


Pulse Rate 95 H 92 H 89


 


Respiratory 18 13 16





Rate   


 


Blood Pressure 155/97 H 147/85 143/89


 


O2 Sat by Pulse 100 100 100





Oximetry   














  12/26/15 12/26/15 12/26/15





  08:00 09:00 10:00


 


Temperature 98.8 F  


 


Pulse Rate 94 H 98 H 87


 


Respiratory 15 17 20





Rate   


 


Blood Pressure 150/93 H 124/87 117/85


 


O2 Sat by Pulse 100 100 100





Oximetry   














  12/26/15





  11:00


 


Temperature 


 


Pulse Rate 88


 


Respiratory 19





Rate 


 


Blood Pressure 128/84


 


O2 Sat by Pulse 100





Oximetry 











Intake and Output (last 12 hours): 


 Intake & Output











 12/25/15 12/26/15 12/26/15





 18:59 06:59 18:59


 


Intake Total 1454 1270 505


 


Output Total 650 600 


 


Balance 804 670 505


 


Intake:   


 


  IV   15


 


  Intake, Piggyback 134 100 100


 


  Tube Feeding 720 720 240


 


  Free Water Flush 600 450 150


 


Output:   


 


  Urine 650 600 


 


    Urethral (Mcgee) 650 600 


 


Other:   


 


  # Bowel Movements 1 2 














- Medications


Medications: 


 Current Medications





Acetaminophen (Tylenol 650mg/20.3ml Solution Ud)  650 mg PO Q6 PRN


   PRN Reason: Fever


   Last Admin: 12/18/15 02:28 Dose:  650 mg


Acetaminophen (Tylenol 650 Mg Supp)  650 mg IA Q6 PRN


   PRN Reason: fevers


   Last Admin: 12/14/15 06:53 Dose:  650 mg


Albuterol/Ipratropium (Duoneb 3 Mg/0.5 Mg (3 Ml) Ud)  3 ml INH RQ6 [facility]


   Last Admin: 12/26/15 07:43 Dose:  3 ml


Aspirin (Ecotrin)  81 mg PO DAILY [facility]


   Last Admin: 12/26/15 08:22 Dose:  81 mg


Enalapril Maleate (Vasotec)  10 mg PO DAILY [facility]


   Last Admin: 12/26/15 08:24 Dose:  10 mg


Famotidine (Pepcid)  20 mg GT BID [facility]


   Last Admin: 12/26/15 08:24 Dose:  20 mg


Cefepime HCl 2 gm/ Sodium (Chloride)  100 mls @ 100 mls/hr IVPB Q12 [facility]


   Last Admin: 12/26/15 08:23 Dose:  100 mls/hr


Insulin Human Regular (Humulin R)  0 units SC ACHS [facility]DAL[facility]


   PRN Reason: Protocol


   Last Admin: 12/26/15 11:33 Dose:  Not Given


Levetiracetam (Keppra)  500 mg PO BID [facility]


   Last Admin: 12/26/15 08:22 Dose:  500 mg


Metoprolol Tartrate (Lopressor)  100 mg PO Q12 [facility]


   Last Admin: 12/26/15 08:22 Dose:  100 mg


Multivitamins/Vitamin C (Multi-Delyn Liquid)  5 ml PO DAILY [facility]


   Last Admin: 12/26/15 08:23 Dose:  5 ml


Nitroglycerin (Nitro-Bid 2% Oint)  2 in TOP Q6 [facility]


   Last Admin: 12/26/15 09:19 Dose:  2 in











- Labs


Labs (last 24 hours): 


 Laboratory Results - last 24 hr











  12/25/15 12/25/15 12/25/15





  12:34 16:03 22:01


 


WBC   


 


RBC   


 


Hgb   


 


Hct   


 


MCV   


 


MCH   


 


MCHC   


 


RDW   


 


Plt Count   


 


pCO2   


 


pO2   


 


HCO3   


 


ABG pH   


 


ABG Total CO2   


 


ABG O2 Saturation   


 


ABG O2 Content   


 


ABG Base Excess   


 


ABG Hemoglobin   


 


ABG Carboxyhemoglobin   


 


POC ABG HHb (Measured)   


 


ABG Methemoglobin   


 


ABG O2 Capacity   


 


Chu Test   


 


A-a O2 Difference   


 


Hgb O2 Saturation   


 


FiO2   


 


Sodium   


 


Potassium   


 


Chloride   


 


Carbon Dioxide   


 


Anion Gap   


 


BUN   


 


Creatinine   


 


Est GFR ( Amer)   


 


Est GFR (Non-Af Amer)   


 


POC Glucose (mg/dL)  110  82  102


 


Random Glucose   


 


Calcium   














  12/26/15 12/26/15 12/26/15





  05:18 05:30 07:11


 


WBC   12.5 H 


 


RBC   3.04 L 


 


Hgb   9.8 L 


 


Hct   30.6 L 


 


MCV   100.6 H D 


 


MCH   32.2 H 


 


MCHC   32.0 L 


 


RDW   13.1 


 


Plt Count   546 H 


 


pCO2  42  


 


pO2  139 H  


 


HCO3  29.2 H  


 


ABG pH  7.46 H  


 


ABG Total CO2  31.2 H  


 


ABG O2 Saturation  98.6 H  


 


ABG O2 Content  14.3 L  


 


ABG Base Excess  5.5 H  


 


ABG Hemoglobin  10.3 L  


 


ABG Carboxyhemoglobin  1.1  


 


POC ABG HHb (Measured)  1.4  


 


ABG Methemoglobin  0.7  


 


ABG O2 Capacity  14.5 L  


 


Chu Test  Yes  


 


A-a O2 Difference  165.0  


 


Hgb O2 Saturation  96.8  


 


FiO2  50.0  


 


Sodium   141 


 


Potassium   4.0 


 


Chloride   103 


 


Carbon Dioxide   29 


 


Anion Gap   12 


 


BUN   18 


 


Creatinine   0.8 


 


Est GFR ( Amer)   > 60 


 


Est GFR (Non-Af Amer)   > 60 


 


POC Glucose (mg/dL)    93


 


Random Glucose   99 


 


Calcium   9.1 














  12/26/15





  11:30


 


WBC 


 


RBC 


 


Hgb 


 


Hct 


 


MCV 


 


MCH 


 


MCHC 


 


RDW 


 


Plt Count 


 


pCO2 


 


pO2 


 


HCO3 


 


ABG pH 


 


ABG Total CO2 


 


ABG O2 Saturation 


 


ABG O2 Content 


 


ABG Base Excess 


 


ABG Hemoglobin 


 


ABG Carboxyhemoglobin 


 


POC ABG HHb (Measured) 


 


ABG Methemoglobin 


 


ABG O2 Capacity 


 


Chu Test 


 


A-a O2 Difference 


 


Hgb O2 Saturation 


 


FiO2 


 


Sodium 


 


Potassium 


 


Chloride 


 


Carbon Dioxide 


 


Anion Gap 


 


BUN 


 


Creatinine 


 


Est GFR ( Amer) 


 


Est GFR (Non-Af Amer) 


 


POC Glucose (mg/dL)  109


 


Random Glucose 


 


Calcium 














- Constitutional


Appears: Non-toxic, No Acute Distress, Chronically Ill





- Head Exam


Head Exam: ATRAUMATIC, NORMAL INSPECTION, NORMOCEPHALIC





- Eye Exam


Eye Exam: EOMI





- Respiratory Exam


Respiratory Exam: Rhonchi


Additional comments: 


good air entry, vented





- Cardiovascular Exam


Cardiovascular Exam: REGULAR RHYTHM, RRR





- GI/Abdominal Exam


GI & Abdominal Exam: Normal Bowel Sounds, Soft, Unremarkable





- Extremities Exam


Extremities exam: full ROM, normal capillary refill, normal inspection, pedal 

pulses present





- Back Exam


Back exam: FULL ROM





- Skin


Skin Exam: Dry, Intact, Normal Color, Warm





Assessment/Plan


(1) Cardiac arrest


Current Visit: Yes   Status: Acute


Comment: nsr in 80-90s


cardio on board and pt is w/o further episodes of VT


no cardio interventions planned at present.


pt only appears to become tachycardic when agitated


will need trach for optimal outcome








(2) DVT prophylaxis


Current Visit: Yes   Status: Acute


Comment: lovenox








(3) Scrotal edema


Current Visit: Yes   Status: Acute


Comment: scrotal us-noted


urology consult-dr cortes to see pt when stable








(4) History of seizure


Current Visit: Yes   Status: Acute


Comment: neuro consult


?? eeg


started on keppra








(5) Diabetes mellitus with hyperglycemia


Current Visit: Yes   Status: Acute


Comment: riss per protocol








(6) Agitation


Current Visit: Yes   Status: Acute


Comment: propofol d/c


ativan prn








(7) UTI (urinary tract infection)


Current Visit: Yes   Status: Acute


Comment: cont anbx


mcgee cath


uro consult pending








(8) Aspiration pneumonia


Current Visit: Yes   Status: Acute


Comment: cipro


cont IV Cefapime


BD NEBS Q 6H PRN


f/u repeat c/s


psudomonas in trach asp


cont ID consult


cxr improving


likely will need trach placement for optimal care

## 2015-12-26 NOTE — CP.CCUPN
CCU Subjective





- Physician Review


Events Since Last Encounter (Free Text): 





12/26/15 12:22


Awake, on vent, multiple attempts to wean him off from vent has not been 

successful, now on AC and needs tracheostomy. 





CCU Objective





- Vital Signs / Intake & Output


Vital Signs (Last 4 hours): 


Vital Signs











  Temp Pulse Resp BP Pulse Ox


 


 12/26/15 12:00  98.7 F  90  18  138/97 H  100


 


 12/26/15 11:00   88  19  128/84  100


 


 12/26/15 10:00   87  20  117/85  100


 


 12/26/15 09:00   98 H  17  124/87  100











Intake and Output (Last 8hrs): 


 Intake & Output











 12/25/15 12/26/15 12/26/15





 22:59 06:59 14:59


 


Intake Total 1030 780 785


 


Output Total 650 600 


 


Balance 380 180 785


 


Intake:   


 


  IV   25


 


  Intake, Piggyback 100  100


 


  Tube Feeding 480 480 360


 


  Free Water Flush 450 300 300


 


Output:   


 


  Urine 650 600 


 


    Urethral (Lua) 650 600 


 


Other:   


 


  # Bowel Movements 1 2 














- Physical Exam


Narrative Physical Exam (Free Text): 





12/26/15 12:24


P/E


Neck: No JVD


Lungs: decreased BS, rt base


Heart: S1 S2 reg


Abdomen: soft, non-tender


LN: No lymphedenopathy


Ext: +1 edema


Head: Positive for: Atraumatic, Normocephalic


Pupils: Positive for: PERRL


Extroacular Muscles: Positive for: EOMI


Conjunctiva: Positive for: Normal.  Negative for: Injected, Icteric


Neck: Positive for: Normal Range of Motion, Trachea Midline.  Negative for: 

Meningeal Signs, MIDLINE TENDERNESS, Paraspinal Tenderness, JVD, Lymphadenopathy

, Bruit, Other


Respiratory/Chest: Positive for: Clear to Auscultation, Good Air Exchange.  

Negative for: Respiratory Distress, Accessory Muscle Use, Wheezes, Decreased 

Breath Sounds, Rales, Retracting, Rhonchi, Tachypneic, Tender to Palpation, 

Other


Cardiovascular: Positive for: Normal S1, S2, Irregular Rhythm, Peripheal Pulses 

Present.  Negative for: Murmurs


Abdomen: Positive for: Normal Bowel Sounds.  Negative for: Tenderness, 

Distention, Peritoneal Signs


Upper Extremity: Positive for: Normal Inspection.  Negative for: Cyanosis, Edema


Lower Extremity: Positive for: Normal Inspection.  Negative for: Edema, CALF 

TENDERNESS





- Medications


Active Medications: 


Active Medications











Generic Name Dose Route Start Last Admin





  Trade Name Freq  PRN Reason Stop Dose Admin


 


Acetaminophen  650 mg 12/13/15 13:21 12/18/15 02:28





  Tylenol 650mg/20.3ml Solution Ud  PO   650 mg





  Q6 PRN   Administration





  Fever   


 


Acetaminophen  650 mg 12/13/15 23:57 12/14/15 06:53





  Tylenol 650 Mg Supp  TX   650 mg





  Q6 PRN   Administration





  fevers   


 


Albuterol/Ipratropium  3 ml 12/18/15 08:00 12/26/15 07:43





  Duoneb 3 Mg/0.5 Mg (3 Ml) Ud  INH   3 ml





  RQ6 [facility]   Administration


 


Aspirin  81 mg 12/13/15 13:30 12/26/15 08:22





  Ecotrin  PO   81 mg





  DAILY MAGDALENA   Administration


 


Enalapril Maleate  10 mg 12/10/15 11:15 12/26/15 08:24





  Vasotec  PO   10 mg





  DAILY MAGDALENA   Administration


 


Famotidine  20 mg 12/10/15 17:00 12/26/15 08:24





  Pepcid  GT   20 mg





  BID MAGDALENA   Administration


 


Cefepime HCl 2 gm/ Sodium  100 mls @ 100 mls/hr 12/21/15 21:00 12/26/15 08:23





  Chloride  IVPB   100 mls/hr





  Q12 [facility]AL[facility]   Administration


 


Insulin Human Regular  0 units 12/10/15 22:00 12/26/15 11:33





  Humulin R  SC   Not Given





  [facility]   





  Protocol   


 


Levetiracetam  500 mg 12/18/15 09:00 12/26/15 08:22





  Keppra  PO   500 mg





  BID MAGDALENA   Administration


 


Metoprolol Tartrate  100 mg 12/24/15 09:00 12/26/15 08:22





  Lopressor  PO   100 mg





  Q12 [facility]   Administration


 


Multivitamins/Vitamin C  5 ml 12/10/15 09:00 12/26/15 08:23





  Multi-Delyn Liquid  PO   5 ml





  DAILY MAGDALENA   Administration


 


Nitroglycerin  2 in 12/10/15 16:00 12/26/15 09:19





  Nitro-Bid 2% Oint  TOP   2 in





  Q6 [facility]   Administration














- Patient Studies


Lab Studies: 


 Lab Studies











  12/26/15 12/26/15 12/26/15 Range/Units





  11:30 07:11 05:30 


 


WBC    12.5 H  (4.8-10.8)  K/uL


 


RBC    3.04 L  (4.40-5.90)  Mil/uL


 


Hgb    9.8 L  (12.0-18.0)  g/dL


 


Hct    30.6 L  (35.0-51.0)  %


 


MCV    100.6 H D  (80.0-94.0)  fl


 


MCH    32.2 H  (27.0-31.0)  pg


 


MCHC    32.0 L  (33.0-37.0)  g/dL


 


RDW    13.1  (11.5-14.5)  %


 


Plt Count    546 H  (130-400)  K/uL


 


pCO2     (35-45)  mm/Hg


 


pO2     ()  mm/Hg


 


HCO3     (21-28)  mmol/L


 


ABG pH     (7.35-7.45)  


 


ABG Total CO2     (22-28)  mmol/L


 


ABG O2 Saturation     (95-98)  %


 


ABG O2 Content     (15-23)  ML/dL


 


ABG Base Excess     (-2.0-3.0)  mmol/L


 


ABG Hemoglobin     (11.7-17.4)  g/dL


 


ABG Carboxyhemoglobin     (0.5-1.5)  %


 


POC ABG HHb (Measured)     (0.0-5.0)  %


 


ABG Methemoglobin     (0.0-3.0)  %


 


ABG O2 Capacity     (16-24)  mL/dL


 


Chu Test     


 


A-a O2 Difference     mm/Hg


 


Hgb O2 Saturation     (95.0-98.0)  %


 


FiO2     %


 


Sodium    141  (132-148)  mmol/l


 


Potassium    4.0  (3.6-5.0)  MMOL/L


 


Chloride    103  ()  mmol/L


 


Carbon Dioxide    29  (22-30)  mmol/L


 


Anion Gap    12  (10-20)  


 


BUN    18  (9-20)  mg/dl


 


Creatinine    0.8  (0.8-1.5)  mg/dL


 


Est GFR (African Amer)    > 60  


 


Est GFR (Non-Af Amer)    > 60  


 


POC Glucose (mg/dL)  109  93   ()  mg/dL


 


Random Glucose    99  ()  mg/dL


 


Calcium    9.1  (8.4-10.2)  mg/dL














  12/26/15 12/25/15 12/25/15 Range/Units





  05:18 22:01 16:03 


 


WBC     (4.8-10.8)  K/uL


 


RBC     (4.40-5.90)  Mil/uL


 


Hgb     (12.0-18.0)  g/dL


 


Hct     (35.0-51.0)  %


 


MCV     (80.0-94.0)  fl


 


MCH     (27.0-31.0)  pg


 


MCHC     (33.0-37.0)  g/dL


 


RDW     (11.5-14.5)  %


 


Plt Count     (130-400)  K/uL


 


pCO2  42    (35-45)  mm/Hg


 


pO2  139 H    ()  mm/Hg


 


HCO3  29.2 H    (21-28)  mmol/L


 


ABG pH  7.46 H    (7.35-7.45)  


 


ABG Total CO2  31.2 H    (22-28)  mmol/L


 


ABG O2 Saturation  98.6 H    (95-98)  %


 


ABG O2 Content  14.3 L    (15-23)  ML/dL


 


ABG Base Excess  5.5 H    (-2.0-3.0)  mmol/L


 


ABG Hemoglobin  10.3 L    (11.7-17.4)  g/dL


 


ABG Carboxyhemoglobin  1.1    (0.5-1.5)  %


 


POC ABG HHb (Measured)  1.4    (0.0-5.0)  %


 


ABG Methemoglobin  0.7    (0.0-3.0)  %


 


ABG O2 Capacity  14.5 L    (16-24)  mL/dL


 


Chu Test  Yes    


 


A-a O2 Difference  165.0    mm/Hg


 


Hgb O2 Saturation  96.8    (95.0-98.0)  %


 


FiO2  50.0    %


 


Sodium     (132-148)  mmol/l


 


Potassium     (3.6-5.0)  MMOL/L


 


Chloride     ()  mmol/L


 


Carbon Dioxide     (22-30)  mmol/L


 


Anion Gap     (10-20)  


 


BUN     (9-20)  mg/dl


 


Creatinine     (0.8-1.5)  mg/dL


 


Est GFR (African Amer)     


 


Est GFR (Non-Af Amer)     


 


POC Glucose (mg/dL)   102  82  ()  mg/dL


 


Random Glucose     ()  mg/dL


 


Calcium     (8.4-10.2)  mg/dL














  12/25/15 Range/Units





  12:34 


 


WBC   (4.8-10.8)  K/uL


 


RBC   (4.40-5.90)  Mil/uL


 


Hgb   (12.0-18.0)  g/dL


 


Hct   (35.0-51.0)  %


 


MCV   (80.0-94.0)  fl


 


MCH   (27.0-31.0)  pg


 


MCHC   (33.0-37.0)  g/dL


 


RDW   (11.5-14.5)  %


 


Plt Count   (130-400)  K/uL


 


pCO2   (35-45)  mm/Hg


 


pO2   ()  mm/Hg


 


HCO3   (21-28)  mmol/L


 


ABG pH   (7.35-7.45)  


 


ABG Total CO2   (22-28)  mmol/L


 


ABG O2 Saturation   (95-98)  %


 


ABG O2 Content   (15-23)  ML/dL


 


ABG Base Excess   (-2.0-3.0)  mmol/L


 


ABG Hemoglobin   (11.7-17.4)  g/dL


 


ABG Carboxyhemoglobin   (0.5-1.5)  %


 


POC ABG HHb (Measured)   (0.0-5.0)  %


 


ABG Methemoglobin   (0.0-3.0)  %


 


ABG O2 Capacity   (16-24)  mL/dL


 


Chu Test   


 


A-a O2 Difference   mm/Hg


 


Hgb O2 Saturation   (95.0-98.0)  %


 


FiO2   %


 


Sodium   (132-148)  mmol/l


 


Potassium   (3.6-5.0)  MMOL/L


 


Chloride   ()  mmol/L


 


Carbon Dioxide   (22-30)  mmol/L


 


Anion Gap   (10-20)  


 


BUN   (9-20)  mg/dl


 


Creatinine   (0.8-1.5)  mg/dL


 


Est GFR (African Amer)   


 


Est GFR (Non-Af Amer)   


 


POC Glucose (mg/dL)  110  ()  mg/dL


 


Random Glucose   ()  mg/dL


 


Calcium   (8.4-10.2)  mg/dL








 Laboratory Results - last 24 hr











  12/25/15 12/25/15 12/25/15





  12:34 16:03 22:01


 


WBC   


 


RBC   


 


Hgb   


 


Hct   


 


MCV   


 


MCH   


 


MCHC   


 


RDW   


 


Plt Count   


 


pCO2   


 


pO2   


 


HCO3   


 


ABG pH   


 


ABG Total CO2   


 


ABG O2 Saturation   


 


ABG O2 Content   


 


ABG Base Excess   


 


ABG Hemoglobin   


 


ABG Carboxyhemoglobin   


 


POC ABG HHb (Measured)   


 


ABG Methemoglobin   


 


ABG O2 Capacity   


 


Chu Test   


 


A-a O2 Difference   


 


Hgb O2 Saturation   


 


FiO2   


 


Sodium   


 


Potassium   


 


Chloride   


 


Carbon Dioxide   


 


Anion Gap   


 


BUN   


 


Creatinine   


 


Est GFR ( Amer)   


 


Est GFR (Non-Af Amer)   


 


POC Glucose (mg/dL)  110  82  102


 


Random Glucose   


 


Calcium   














  12/26/15 12/26/15 12/26/15





  05:18 05:30 07:11


 


WBC   12.5 H 


 


RBC   3.04 L 


 


Hgb   9.8 L 


 


Hct   30.6 L 


 


MCV   100.6 H D 


 


MCH   32.2 H 


 


MCHC   32.0 L 


 


RDW   13.1 


 


Plt Count   546 H 


 


pCO2  42  


 


pO2  139 H  


 


HCO3  29.2 H  


 


ABG pH  7.46 H  


 


ABG Total CO2  31.2 H  


 


ABG O2 Saturation  98.6 H  


 


ABG O2 Content  14.3 L  


 


ABG Base Excess  5.5 H  


 


ABG Hemoglobin  10.3 L  


 


ABG Carboxyhemoglobin  1.1  


 


POC ABG HHb (Measured)  1.4  


 


ABG Methemoglobin  0.7  


 


ABG O2 Capacity  14.5 L  


 


Chu Test  Yes  


 


A-a O2 Difference  165.0  


 


Hgb O2 Saturation  96.8  


 


FiO2  50.0  


 


Sodium   141 


 


Potassium   4.0 


 


Chloride   103 


 


Carbon Dioxide   29 


 


Anion Gap   12 


 


BUN   18 


 


Creatinine   0.8 


 


Est GFR ( Amer)   > 60 


 


Est GFR (Non-Af Amer)   > 60 


 


POC Glucose (mg/dL)    93


 


Random Glucose   99 


 


Calcium   9.1 














  12/26/15





  11:30


 


WBC 


 


RBC 


 


Hgb 


 


Hct 


 


MCV 


 


MCH 


 


MCHC 


 


RDW 


 


Plt Count 


 


pCO2 


 


pO2 


 


HCO3 


 


ABG pH 


 


ABG Total CO2 


 


ABG O2 Saturation 


 


ABG O2 Content 


 


ABG Base Excess 


 


ABG Hemoglobin 


 


ABG Carboxyhemoglobin 


 


POC ABG HHb (Measured) 


 


ABG Methemoglobin 


 


ABG O2 Capacity 


 


Chu Test 


 


A-a O2 Difference 


 


Hgb O2 Saturation 


 


FiO2 


 


Sodium 


 


Potassium 


 


Chloride 


 


Carbon Dioxide 


 


Anion Gap 


 


BUN 


 


Creatinine 


 


Est GFR ( Amer) 


 


Est GFR (Non-Af Amer) 


 


POC Glucose (mg/dL)  109


 


Random Glucose 


 


Calcium 











Fingerstick Blood Sugar Results: 109





Assessment/Plan





- Assessment and Plan (Free Text)


Assessment: 


Resp Failure"


Aspiration PNA


Arrythmias: recurrent afib, now in SR


Anemia


Card arrest , recent h/o


Anoxic encephalopathy


Plan: 


On vent


On AC :  Fio .50


Continue current meds


Cefapine for asp PNA


Tube feeding


Needs tracheostomy, failed weaning attempts, multiple times.

## 2015-12-27 LAB
ALBUMIN SERPL-MCNC: 3.4 G/DL (ref 3.5–5)
ALBUMIN/GLOB SERPL: 0.8 {RATIO} (ref 1–2.1)
ALT SERPL-CCNC: 31 U/L (ref 21–72)
ARTERIAL BLOOD GAS HEMOGLOBIN: 10.4 G/DL (ref 11.7–17.4)
ARTERIAL BLOOD GAS O2 SAT: 98.5 % (ref 95–98)
ARTERIAL BLOOD GAS PCO2: 41 MM/HG (ref 35–45)
ARTERIAL BLOOD GAS TCO2: 31.8 MMOL/L (ref 22–28)
ARTERIAL PATENCY WRIST A: YES
AST SERPL-CCNC: 42 U/L (ref 17–59)
BUN SERPL-MCNC: 18 MG/DL (ref 9–20)
CALCIUM SERPL-MCNC: 9.2 MG/DL (ref 8.4–10.2)
ERYTHROCYTE [DISTWIDTH] IN BLOOD BY AUTOMATED COUNT: 13.2 % (ref 11.5–14.5)
GFR NON-AFRICAN AMERICAN: > 60
HCO3 BLDA-SCNC: 29.9 MMOL/L (ref 21–28)
HGB BLD-MCNC: 9.9 G/DL (ref 12–18)
INHALED O2 CONCENTRATION: 50 %
MCH RBC QN AUTO: 31.2 PG (ref 27–31)
MCHC RBC AUTO-ENTMCNC: 30.9 G/DL (ref 33–37)
MCV RBC AUTO: 101.1 FL (ref 80–94)
O2 CAP BLDA-SCNC: 14.5 ML/DL (ref 16–24)
O2 CT BLDA-SCNC: 14.3 ML/DL (ref 15–23)
PH BLDA: 7.48 [PH] (ref 7.35–7.45)
PLATELET # BLD: 568 K/UL (ref 130–400)
PO2 BLDA: 112 MM/HG (ref 80–100)
RBC # BLD AUTO: 3.16 MIL/UL (ref 4.4–5.9)
WBC # BLD AUTO: 14.1 K/UL (ref 4.8–10.8)

## 2015-12-27 RX ADMIN — LEVETIRACETAM SCH MG: 100 SOLUTION ORAL at 09:14

## 2015-12-27 RX ADMIN — NITROGLYCERIN SCH IN: 20 OINTMENT TOPICAL at 16:28

## 2015-12-27 RX ADMIN — IPRATROPIUM BROMIDE AND ALBUTEROL SULFATE SCH ML: .5; 3 SOLUTION RESPIRATORY (INHALATION) at 08:00

## 2015-12-27 RX ADMIN — IPRATROPIUM BROMIDE AND ALBUTEROL SULFATE SCH ML: .5; 3 SOLUTION RESPIRATORY (INHALATION) at 13:47

## 2015-12-27 RX ADMIN — NITROGLYCERIN SCH IN: 20 OINTMENT TOPICAL at 21:09

## 2015-12-27 RX ADMIN — Medication SCH ML: at 09:15

## 2015-12-27 RX ADMIN — IPRATROPIUM BROMIDE AND ALBUTEROL SULFATE SCH ML: .5; 3 SOLUTION RESPIRATORY (INHALATION) at 19:43

## 2015-12-27 RX ADMIN — LEVETIRACETAM SCH MG: 100 SOLUTION ORAL at 16:28

## 2015-12-27 RX ADMIN — NITROGLYCERIN SCH IN: 20 OINTMENT TOPICAL at 09:15

## 2015-12-27 RX ADMIN — NITROGLYCERIN SCH IN: 20 OINTMENT TOPICAL at 04:00

## 2015-12-27 RX ADMIN — IPRATROPIUM BROMIDE AND ALBUTEROL SULFATE SCH ML: .5; 3 SOLUTION RESPIRATORY (INHALATION) at 01:03

## 2015-12-27 NOTE — CP.CCUPN
CCU Subjective





- Physician Review


Events Since Last Encounter (Free Text): 





12/27/15 15:55


on vent, in SR





CCU Objective





- Vital Signs / Intake & Output


Vital Signs (Last 4 hours): 


Vital Signs











  Temp Pulse Resp BP Pulse Ox


 


 12/27/15 13:21   88  16  140/90  100


 


 12/27/15 12:00  98.3 F  80  17  148/76  100











Intake and Output (Last 8hrs): 


 Intake & Output











 12/27/15 12/27/15 12/27/15





 06:59 14:59 22:59


 


Intake Total 780 880 


 


Output Total 450  


 


Balance 330 880 


 


Intake:   


 


  Intake, Piggyback  100 


 


  Tube Feeding 480 480 


 


  Free Water Flush 300 300 


 


Output:   


 


  Urine 450  


 


    Urethral (Lua) 450  


 


Other:   


 


  # Bowel Movements 1  














- Physical Exam


Narrative Physical Exam (Free Text): 





12/27/15 15:56


P/E


Neck: No JVD


Lungs: no ronchi, some scaterred fine crackles


hear: No gallop


Ext: No edema


Neuro: no obvious focal signs: 


LN: no lymphedenopathy.


Head: Positive for: Atraumatic, Normocephalic


Pupils: Positive for: PERRL


Extroacular Muscles: Positive for: EOMI


Conjunctiva: Positive for: Normal.  Negative for: Injected, Icteric


Neck: Positive for: Normal Range of Motion, Trachea Midline.  Negative for: 

Meningeal Signs, MIDLINE TENDERNESS, Paraspinal Tenderness, JVD, Lymphadenopathy

, Bruit, Other


Respiratory/Chest: Positive for: Clear to Auscultation, Good Air Exchange.  

Negative for: Respiratory Distress, Accessory Muscle Use, Wheezes, Decreased 

Breath Sounds, Rales, Retracting, Rhonchi, Tachypneic, Tender to Palpation, 

Other


Cardiovascular: Positive for: Normal S1, S2, Irregular Rhythm, Peripheal Pulses 

Present.  Negative for: Murmurs


Abdomen: Positive for: Normal Bowel Sounds.  Negative for: Tenderness, 

Distention, Peritoneal Signs


Upper Extremity: Positive for: Normal Inspection.  Negative for: Cyanosis, Edema


Lower Extremity: Positive for: Normal Inspection.  Negative for: Edema, CALF 

TENDERNESS





- Medications


Active Medications: 


Active Medications











Generic Name Dose Route Start Last Admin





  Trade Name Freq  PRN Reason Stop Dose Admin


 


Acetaminophen  650 mg 12/13/15 13:21 12/26/15 16:19





  Tylenol 650mg/20.3ml Solution Ud  PO   650 mg





  Q6 PRN   Administration





  Fever   


 


Acetaminophen  650 mg 12/13/15 23:57 12/14/15 06:53





  Tylenol 650 Mg Supp  AR   650 mg





  Q6 PRN   Administration





  fevers   


 


Albuterol/Ipratropium  3 ml 12/18/15 08:00 12/27/15 13:47





  Duoneb 3 Mg/0.5 Mg (3 Ml) Ud  INH   3 ml





  RQ6 MAGDALENA   Administration


 


Aspirin  81 mg 12/13/15 13:30 12/27/15 09:14





  Ecotrin  PO   81 mg





  DAILY MAGDALENA   Administration


 


Enalapril Maleate  10 mg 12/10/15 11:15 12/27/15 09:15





  Vasotec  PO   10 mg





  DAILY MAGDALENA   Administration


 


Famotidine  20 mg 12/10/15 17:00 12/27/15 09:15





  Pepcid  GT   20 mg





  BID MAGDALENA   Administration


 


Cefepime HCl 2 gm/ Sodium  100 mls @ 100 mls/hr 12/21/15 21:00 12/27/15 09:16





  Chloride  IVPB   100 mls/hr





  Q12 MAGDALENA   Administration


 


Insulin Human Regular  0 units 12/10/15 22:00 12/27/15 13:18





  Humulin R  SC   Not Given





  [facility]   





  Protocol   


 


Levetiracetam  500 mg 12/18/15 09:00 12/27/15 09:14





  Keppra  PO   500 mg





  BID MAGDALENA   Administration


 


Metoprolol Tartrate  100 mg 12/24/15 09:00 12/27/15 09:14





  Lopressor  PO   100 mg





  Q12 MAGDALENA   Administration


 


Multivitamins/Vitamin C  5 ml 12/10/15 09:00 12/27/15 09:15





  Multi-Delyn Liquid  PO   5 ml





  DAILY MAGDALENA   Administration


 


Nitroglycerin  2 in 12/10/15 16:00 12/27/15 09:15





  Nitro-Bid 2% Oint  TOP   2 in





  Q6 MAGDALENA   Administration














- Patient Studies


Lab Studies: 


 Lab Studies











  12/27/15 12/27/15 12/27/15 Range/Units





  10:16 05:44 05:27 


 


WBC     (4.8-10.8)  K/uL


 


RBC     (4.40-5.90)  Mil/uL


 


Hgb     (12.0-18.0)  g/dL


 


Hct     (35.0-51.0)  %


 


MCV     (80.0-94.0)  fl


 


MCH     (27.0-31.0)  pg


 


MCHC     (33.0-37.0)  g/dL


 


RDW     (11.5-14.5)  %


 


Plt Count     (130-400)  K/uL


 


pCO2   41   (35-45)  mm/Hg


 


pO2   112 H   ()  mm/Hg


 


HCO3   29.9 H   (21-28)  mmol/L


 


ABG pH   7.48 H   (7.35-7.45)  


 


ABG Total CO2   31.8 H   (22-28)  mmol/L


 


ABG O2 Saturation   98.5 H   (95-98)  %


 


ABG O2 Content   14.3 L   (15-23)  ML/dL


 


ABG Base Excess   6.4 H   (-2.0-3.0)  mmol/L


 


ABG Hemoglobin   10.4 L   (11.7-17.4)  g/dL


 


ABG Carboxyhemoglobin   1.2   (0.5-1.5)  %


 


POC ABG HHb (Measured)   1.5   (0.0-5.0)  %


 


ABG Methemoglobin   1.0   (0.0-3.0)  %


 


ABG O2 Capacity   14.5 L   (16-24)  mL/dL


 


Chu Test   Yes   


 


A-a O2 Difference   193.0   mm/Hg


 


Hgb O2 Saturation   96.3   (95.0-98.0)  %


 


FiO2   50.0   %


 


Crit Value Called To   Codi bass   


 


Crit Value Read Back   Yes   


 


Blood Gas Notified Time   552   


 


Sodium     (132-148)  mmol/l


 


Potassium     (3.6-5.0)  MMOL/L


 


Chloride     ()  mmol/L


 


Carbon Dioxide     (22-30)  mmol/L


 


Anion Gap     (10-20)  


 


BUN     (9-20)  mg/dl


 


Creatinine     (0.8-1.5)  mg/dL


 


Est GFR (African Amer)     


 


Est GFR (Non-Af Amer)     


 


POC Glucose (mg/dL)  75   101  ()  mg/dL


 


Random Glucose     ()  mg/dL


 


Calcium     (8.4-10.2)  mg/dL


 


Total Bilirubin     (0.2-1.3)  mg/dl


 


AST     (17-59)  U/L


 


ALT     (21-72)  U/L


 


Alkaline Phosphatase     ()  U/L


 


Total Protein     (6.3-8.2)  G/DL


 


Albumin     (3.5-5.0)  g/dL


 


Globulin     (2.2-3.9)  gm/dL


 


Albumin/Globulin Ratio     (1.0-2.1)  














  12/27/15 12/26/15 12/26/15 Range/Units





  04:30 20:17 16:17 


 


WBC  14.1 H    (4.8-10.8)  K/uL


 


RBC  3.16 L    (4.40-5.90)  Mil/uL


 


Hgb  9.9 L    (12.0-18.0)  g/dL


 


Hct  31.9 L    (35.0-51.0)  %


 


MCV  101.1 H    (80.0-94.0)  fl


 


MCH  31.2 H    (27.0-31.0)  pg


 


MCHC  30.9 L    (33.0-37.0)  g/dL


 


RDW  13.2    (11.5-14.5)  %


 


Plt Count  568 H    (130-400)  K/uL


 


pCO2     (35-45)  mm/Hg


 


pO2     ()  mm/Hg


 


HCO3     (21-28)  mmol/L


 


ABG pH     (7.35-7.45)  


 


ABG Total CO2     (22-28)  mmol/L


 


ABG O2 Saturation     (95-98)  %


 


ABG O2 Content     (15-23)  ML/dL


 


ABG Base Excess     (-2.0-3.0)  mmol/L


 


ABG Hemoglobin     (11.7-17.4)  g/dL


 


ABG Carboxyhemoglobin     (0.5-1.5)  %


 


POC ABG HHb (Measured)     (0.0-5.0)  %


 


ABG Methemoglobin     (0.0-3.0)  %


 


ABG O2 Capacity     (16-24)  mL/dL


 


Chu Test     


 


A-a O2 Difference     mm/Hg


 


Hgb O2 Saturation     (95.0-98.0)  %


 


FiO2     %


 


Crit Value Called To     


 


Crit Value Read Back     


 


Blood Gas Notified Time     


 


Sodium  140    (132-148)  mmol/l


 


Potassium  3.8    (3.6-5.0)  MMOL/L


 


Chloride  101    ()  mmol/L


 


Carbon Dioxide  30    (22-30)  mmol/L


 


Anion Gap  13    (10-20)  


 


BUN  18    (9-20)  mg/dl


 


Creatinine  0.8    (0.8-1.5)  mg/dL


 


Est GFR (African Amer)  > 60    


 


Est GFR (Non-Af Amer)  > 60    


 


POC Glucose (mg/dL)   98  112 H  ()  mg/dL


 


Random Glucose  106    ()  mg/dL


 


Calcium  9.2    (8.4-10.2)  mg/dL


 


Total Bilirubin  0.5    (0.2-1.3)  mg/dl


 


AST  42    (17-59)  U/L


 


ALT  31    (21-72)  U/L


 


Alkaline Phosphatase  163 H    ()  U/L


 


Total Protein  7.9    (6.3-8.2)  G/DL


 


Albumin  3.4 L    (3.5-5.0)  g/dL


 


Globulin  4.5 H    (2.2-3.9)  gm/dL


 


Albumin/Globulin Ratio  0.8 L    (1.0-2.1)  








 Laboratory Results - last 24 hr











  12/26/15 12/26/15 12/27/15





  16:17 20:17 04:30


 


WBC    14.1 H


 


RBC    3.16 L


 


Hgb    9.9 L


 


Hct    31.9 L


 


MCV    101.1 H


 


MCH    31.2 H


 


MCHC    30.9 L


 


RDW    13.2


 


Plt Count    568 H


 


pCO2   


 


pO2   


 


HCO3   


 


ABG pH   


 


ABG Total CO2   


 


ABG O2 Saturation   


 


ABG O2 Content   


 


ABG Base Excess   


 


ABG Hemoglobin   


 


ABG Carboxyhemoglobin   


 


POC ABG HHb (Measured)   


 


ABG Methemoglobin   


 


ABG O2 Capacity   


 


Chu Test   


 


A-a O2 Difference   


 


Hgb O2 Saturation   


 


FiO2   


 


Crit Value Called To   


 


Crit Value Read Back   


 


Blood Gas Notified Time   


 


Sodium    140


 


Potassium    3.8


 


Chloride    101


 


Carbon Dioxide    30


 


Anion Gap    13


 


BUN    18


 


Creatinine    0.8


 


Est GFR ( Amer)    > 60


 


Est GFR (Non-Af Amer)    > 60


 


POC Glucose (mg/dL)  112 H  98 


 


Random Glucose    106


 


Calcium    9.2


 


Total Bilirubin    0.5


 


AST    42


 


ALT    31


 


Alkaline Phosphatase    163 H


 


Total Protein    7.9


 


Albumin    3.4 L


 


Globulin    4.5 H


 


Albumin/Globulin Ratio    0.8 L














  12/27/15 12/27/15 12/27/15





  05:27 05:44 10:16


 


WBC   


 


RBC   


 


Hgb   


 


Hct   


 


MCV   


 


MCH   


 


MCHC   


 


RDW   


 


Plt Count   


 


pCO2   41 


 


pO2   112 H 


 


HCO3   29.9 H 


 


ABG pH   7.48 H 


 


ABG Total CO2   31.8 H 


 


ABG O2 Saturation   98.5 H 


 


ABG O2 Content   14.3 L 


 


ABG Base Excess   6.4 H 


 


ABG Hemoglobin   10.4 L 


 


ABG Carboxyhemoglobin   1.2 


 


POC ABG HHb (Measured)   1.5 


 


ABG Methemoglobin   1.0 


 


ABG O2 Capacity   14.5 L 


 


Chu Test   Yes 


 


A-a O2 Difference   193.0 


 


Hgb O2 Saturation   96.3 


 


FiO2   50.0 


 


Crit Value Called To   Codi bass 


 


Crit Value Read Back   Yes 


 


Blood Gas Notified Time   552 


 


Sodium   


 


Potassium   


 


Chloride   


 


Carbon Dioxide   


 


Anion Gap   


 


BUN   


 


Creatinine   


 


Est GFR ( Amer)   


 


Est GFR (Non-Af Amer)   


 


POC Glucose (mg/dL)  101   75


 


Random Glucose   


 


Calcium   


 


Total Bilirubin   


 


AST   


 


ALT   


 


Alkaline Phosphatase   


 


Total Protein   


 


Albumin   


 


Globulin   


 


Albumin/Globulin Ratio   











Fingerstick Blood Sugar Results: 101





Assessment/Plan





- Assessment and Plan (Free Text)


Assessment: 








 Assessment/Plan 


(1) Cardiac arrest


Current Visit: Yes   Status: Acute


Comment: nsr in 80-90s


cardio on board and pt is w/o further episodes of VT


pt only appears to become tachycardic when agitated





(2) Scrotal edema


Current Visit: Yes   Status: Acute


Comment: scrotal us-noted


urology consult-dr cortes to see pt when stable





(3) Anoxic encephalopathy and h/o siezure


Current Visit: Yes   Status: Acute


Comment: neuro consult


on keppra





(4) Diabetes mellitus with hyperglycemia


Current Visit: Yes   Status: Acute


Comment: riss per protocol





(5) Resp failure: aspiration PNA


Current Visit: Yes   Status: Acute


Comment: cont anbx


On vent, weaning not successful, needs trach


On AC, , PEEP 5 Fio 0.5





(6) Aspiration pneumonia


Current Visit: Yes   Status: Acute


Comment: cipro


cont IV Cefapime


BD NEBS Q 6H PRN


f/u repeat c/s


psudomonas in trach asp


cont ID consult


cxr improving

## 2015-12-27 NOTE — RAD
Chest 12/27/2015. 



History: Vented. 



Single AP portable view chest performed and compared with prior study 

03/26/2015. 



Findings: In situ ETT, tip of which lies approximately 5.08 cm above 

ru.  NGT is present tip of which overlies left lateral upper 

quadrant of the abdomen. Right sided PICC line unchanged. 



Cardiomegaly.  Mild vascular congestive changes slightly improved. 

Vascular congestion slightly improved. Patchy infiltrate changes in 

the left mid-lower lung fields slightly improved. Increased opacity 

in the right lung base may represent some atelectasis.  Suspect small 

bilateral effusions right larger than left. 



Impression: Support lines and tubes as described. 



Cardiomegaly. Vascular congestion slightly improved. Slightly 

improved patchy infiltrate left mid and lower lung field.  Minor mild 

increased density right lung base could represent some atelectasis 

versus developing infiltrate.  Suspect small bilateral effusions

## 2015-12-27 NOTE — CP.PCM.PN
Subjective





- Subjective


Subjective: 


pt remains as assessed.  no compalints. openes eyes and is able to visually 

respond to questions.  nsr on monitor, vs and b/w noted. cxr appriciated.  

comfortable on vent despite being unable to be weaned off vent.





Review of Systems





- Review of Systems


Systems not reviewed;Unavailable: Intubated





- Constitutional


Constitutional: UN





- EENT


Eyes: UNREMARKABLE


Ears: UNREMARKABLE


Nose/Mouth/Throat: UNREMARKABLE





- Cardiovascular


Cardiovascular: UNREMARKABLE





- Respiratory


Respiratory: As Per HPI, Excessive Mucous Production





- Gastrointestinal


Gastrointestinal: UNREMARKABLE





- Genitourinary


Genitourinary: UNREMARKABLE





- Reproductive: Male


Reproductive:Male: UNREMARKABLE





- Musculoskeletal


Musculoskeletal: UNREMARKABLE





- Integumentary


Integumentary: UNREMARKABLE





- Neurological


Neurological: UNREMARKABLE





- Psychiatric


Psychiatric: UNREMARKABLE





- Endocrine


Endocrine: UNREMARKABLE





- Hematologic/Lymphatic


Hematologic: UNREMARKABLE





Objective





- Vital Signs/Intake and Output


Vital Signs (last 24 hours): 


 Vital Signs - 24 hr











  12/26/15 12/26/15 12/26/15





  14:00 16:00 18:00


 


Temperature  99.7 F H 99 F


 


Pulse Rate 98 H 99 H 92 H


 


Respiratory 18 17 20





Rate   


 


Blood Pressure 132/85 130/83 130/82


 


O2 Sat by Pulse 98 100 100





Oximetry   














  12/26/15 12/26/15 12/27/15





  20:00 22:00 00:00


 


Temperature 99.2 F  99.3 F


 


Pulse Rate 91 H 89 95 H


 


Respiratory 19 19 18





Rate   


 


Blood Pressure 134/84 138/93 H 132/82


 


O2 Sat by Pulse 100 100 99





Oximetry   














  12/27/15 12/27/15 12/27/15





  02:00 04:00 06:00


 


Temperature  99.4 F 


 


Pulse Rate 96 H 94 H 99 H


 


Respiratory 16 13 15





Rate   


 


Blood Pressure 120/73 117/73 119/77


 


O2 Sat by Pulse 100 100 99





Oximetry   














  12/27/15 12/27/15 12/27/15





  07:57 10:00 12:00


 


Temperature 98.7 F  98.3 F


 


Pulse Rate 97 H 80 80


 


Respiratory 16 19 17





Rate   


 


Blood Pressure 116/63 141/80 148/76


 


O2 Sat by Pulse 98 100 100





Oximetry   














  12/27/15





  13:21


 


Temperature 


 


Pulse Rate 88


 


Respiratory 16





Rate 


 


Blood Pressure 140/90


 


O2 Sat by Pulse 100





Oximetry 











Intake and Output (last 12 hours): 


 Intake & Output











 12/26/15 12/27/15 12/27/15





 18:59 06:59 18:59


 


Intake Total 1325 1210 880


 


Output Total 540 450 


 


Balance 785 760 880


 


Intake:   


 


  IV 55  


 


  Intake, Piggyback 100 100 100


 


  Tube Feeding 720 660 480


 


  Free Water Flush 450 450 300


 


Output:   


 


  Gastric Amount  0 


 


    Stomach  0 


 


  Urine 540 450 


 


    Urethral (Mcgee) 540 450 


 


Other:   


 


  # Bowel Movements 1 1 














- Medications


Medications: 


 Current Medications





Acetaminophen (Tylenol 650mg/20.3ml Solution Ud)  650 mg PO Q6 PRN


   PRN Reason: Fever


   Last Admin: 12/26/15 16:19 Dose:  650 mg


Acetaminophen (Tylenol 650 Mg Supp)  650 mg OR Q6 PRN


   PRN Reason: fevers


   Last Admin: 12/14/15 06:53 Dose:  650 mg


Albuterol/Ipratropium (Duoneb 3 Mg/0.5 Mg (3 Ml) Ud)  3 ml INH RQ6 [facility]


   Last Admin: 12/27/15 13:47 Dose:  3 ml


Aspirin (Ecotrin)  81 mg PO DAILY [facility]


   Last Admin: 12/27/15 09:14 Dose:  81 mg


Enalapril Maleate (Vasotec)  10 mg PO DAILY [facility]


   Last Admin: 12/27/15 09:15 Dose:  10 mg


Famotidine (Pepcid)  20 mg GT BID [facility]


   Last Admin: 12/27/15 09:15 Dose:  20 mg


Cefepime HCl 2 gm/ Sodium (Chloride)  100 mls @ 100 mls/hr IVPB Q12 [facility]


   Last Admin: 12/27/15 09:16 Dose:  100 mls/hr


Insulin Human Regular (Humulin R)  0 units SC ACHS MAGDALENA


   PRN Reason: Protocol


   Last Admin: 12/27/15 13:18 Dose:  Not Given


Levetiracetam (Keppra)  500 mg PO BID [facility]


   Last Admin: 12/27/15 09:14 Dose:  500 mg


Metoprolol Tartrate (Lopressor)  100 mg PO Q12 [facility]


   Last Admin: 12/27/15 09:14 Dose:  100 mg


Multivitamins/Vitamin C (Multi-Delyn Liquid)  5 ml PO DAILY [facility]


   Last Admin: 12/27/15 09:15 Dose:  5 ml


Nitroglycerin (Nitro-Bid 2% Oint)  2 in TOP Q6 [facility]


   Last Admin: 12/27/15 09:15 Dose:  2 in











- Labs


Labs (last 24 hours): 


 Laboratory Results - last 24 hr











  12/26/15 12/26/15 12/27/15





  16:17 20:17 04:30


 


WBC    14.1 H


 


RBC    3.16 L


 


Hgb    9.9 L


 


Hct    31.9 L


 


MCV    101.1 H


 


MCH    31.2 H


 


MCHC    30.9 L


 


RDW    13.2


 


Plt Count    568 H


 


pCO2   


 


pO2   


 


HCO3   


 


ABG pH   


 


ABG Total CO2   


 


ABG O2 Saturation   


 


ABG O2 Content   


 


ABG Base Excess   


 


ABG Hemoglobin   


 


ABG Carboxyhemoglobin   


 


POC ABG HHb (Measured)   


 


ABG Methemoglobin   


 


ABG O2 Capacity   


 


Chu Test   


 


A-a O2 Difference   


 


Hgb O2 Saturation   


 


FiO2   


 


Crit Value Called To   


 


Crit Value Read Back   


 


Blood Gas Notified Time   


 


Sodium    140


 


Potassium    3.8


 


Chloride    101


 


Carbon Dioxide    30


 


Anion Gap    13


 


BUN    18


 


Creatinine    0.8


 


Est GFR ( Amer)    > 60


 


Est GFR (Non-Af Amer)    > 60


 


POC Glucose (mg/dL)  112 H  98 


 


Random Glucose    106


 


Calcium    9.2


 


Total Bilirubin    0.5


 


AST    42


 


ALT    31


 


Alkaline Phosphatase    163 H


 


Total Protein    7.9


 


Albumin    3.4 L


 


Globulin    4.5 H


 


Albumin/Globulin Ratio    0.8 L














  12/27/15 12/27/15 12/27/15





  05:27 05:44 10:16


 


WBC   


 


RBC   


 


Hgb   


 


Hct   


 


MCV   


 


MCH   


 


MCHC   


 


RDW   


 


Plt Count   


 


pCO2   41 


 


pO2   112 H 


 


HCO3   29.9 H 


 


ABG pH   7.48 H 


 


ABG Total CO2   31.8 H 


 


ABG O2 Saturation   98.5 H 


 


ABG O2 Content   14.3 L 


 


ABG Base Excess   6.4 H 


 


ABG Hemoglobin   10.4 L 


 


ABG Carboxyhemoglobin   1.2 


 


POC ABG HHb (Measured)   1.5 


 


ABG Methemoglobin   1.0 


 


ABG O2 Capacity   14.5 L 


 


Chu Test   Yes 


 


A-a O2 Difference   193.0 


 


Hgb O2 Saturation   96.3 


 


FiO2   50.0 


 


Crit Value Called To   Codi bass 


 


Crit Value Read Back   Yes 


 


Blood Gas Notified Time   552 


 


Sodium   


 


Potassium   


 


Chloride   


 


Carbon Dioxide   


 


Anion Gap   


 


BUN   


 


Creatinine   


 


Est GFR ( Amer)   


 


Est GFR (Non-Af Amer)   


 


POC Glucose (mg/dL)  101   75


 


Random Glucose   


 


Calcium   


 


Total Bilirubin   


 


AST   


 


ALT   


 


Alkaline Phosphatase   


 


Total Protein   


 


Albumin   


 


Globulin   


 


Albumin/Globulin Ratio   














- Constitutional


Appears: Well, Non-toxic, No Acute Distress





- Head Exam


Head Exam: ATRAUMATIC, NORMAL INSPECTION, NORMOCEPHALIC





- Eye Exam


Eye Exam: EOMI





- Respiratory Exam


Additional comments: 


lungs clearer w/ congestion heard. vented





- Cardiovascular Exam


Cardiovascular Exam: REGULAR RHYTHM, RRR





- GI/Abdominal Exam


GI & Abdominal Exam: Normal Bowel Sounds, Soft, Unremarkable





- Extremities Exam


Extremities exam: full ROM, normal capillary refill, normal inspection, pedal 

pulses present





- Back Exam


Back exam: FULL ROM





- Neurological Exam


Neurological exam: Alert





- Psychiatric Exam


Psychiatric exam: Normal Affect, Normal Mood





- Skin


Skin Exam: Dry, Intact, Normal Color, Warm





Assessment/Plan


(1) Cardiac arrest


Current Visit: Yes   Status: Acute


Comment: nsr in 80-90s


cardio on board and pt is w/o further episodes of VT


no cardio interventions planned at present.


pt only appears to become tachycardic when agitated


will need trach for optimal outcome








(2) DVT prophylaxis


Current Visit: Yes   Status: Acute


Comment: lovenox








(3) Scrotal edema


Current Visit: Yes   Status: Acute


Comment: scrotal us-noted


urology consult-dr cortes to see pt when stable








(4) History of seizure


Current Visit: Yes   Status: Acute


Comment: neuro consult


?? eeg


started on keppra








(5) Diabetes mellitus with hyperglycemia


Current Visit: Yes   Status: Chronic


Comment: riss per protocol


much more controlled, will cont ot monitor








(6) Agitation


Current Visit: Yes   Status: Acute


Comment: propofol d/c


ativan prn








(7) UTI (urinary tract infection)


Current Visit: Yes   Status: Resolved


Comment: cont anbx


mcgee cath


uro consult pending








(8) Aspiration pneumonia


Current Visit: Yes   Status: Acute


Comment: cipro


cont IV Cefapime


BD NEBS Q 6H PRN


f/u repeat c/s


psudomonas in trach asp


cont ID consult


cxr improving


likely will need trach placement for optimal care








(9) Acute respiratory failure


Current Visit: Yes   Status: Acute


Comment: cont vent, will need trach


CXR with B/L congesion and small pleural effusion


Will start diamox x 4 doses

## 2015-12-28 LAB
ALBUMIN SERPL-MCNC: 3.2 G/DL (ref 3.5–5)
ALBUMIN/GLOB SERPL: 0.7 {RATIO} (ref 1–2.1)
ALT SERPL-CCNC: 33 U/L (ref 21–72)
ARTERIAL BLOOD GAS HEMOGLOBIN: 10 G/DL (ref 11.7–17.4)
ARTERIAL BLOOD GAS O2 SAT: 98.7 % (ref 95–98)
ARTERIAL BLOOD GAS PCO2: 47 MM/HG (ref 35–45)
ARTERIAL BLOOD GAS TCO2: 34.1 MMOL/L (ref 22–28)
ARTERIAL PATENCY WRIST A: YES
AST SERPL-CCNC: 46 U/L (ref 17–59)
BUN SERPL-MCNC: 19 MG/DL (ref 9–20)
CALCIUM SERPL-MCNC: 9.2 MG/DL (ref 8.4–10.2)
ERYTHROCYTE [DISTWIDTH] IN BLOOD BY AUTOMATED COUNT: 13.5 % (ref 11.5–14.5)
GFR NON-AFRICAN AMERICAN: > 60
HCO3 BLDA-SCNC: 30.9 MMOL/L (ref 21–28)
HGB BLD-MCNC: 9.1 G/DL (ref 12–18)
INHALED O2 CONCENTRATION: 50 %
MCH RBC QN AUTO: 31.9 PG (ref 27–31)
MCHC RBC AUTO-ENTMCNC: 31.6 G/DL (ref 33–37)
MCV RBC AUTO: 100.8 FL (ref 80–94)
O2 CAP BLDA-SCNC: 14.1 ML/DL (ref 16–24)
O2 CT BLDA-SCNC: 13.9 ML/DL (ref 15–23)
PH BLDA: 7.45 [PH] (ref 7.35–7.45)
PLATELET # BLD: 512 K/UL (ref 130–400)
PO2 BLDA: 136 MM/HG (ref 80–100)
RBC # BLD AUTO: 2.86 MIL/UL (ref 4.4–5.9)
WBC # BLD AUTO: 12.2 K/UL (ref 4.8–10.8)

## 2015-12-28 RX ADMIN — NITROGLYCERIN SCH IN: 20 OINTMENT TOPICAL at 05:26

## 2015-12-28 RX ADMIN — IPRATROPIUM BROMIDE AND ALBUTEROL SULFATE SCH ML: .5; 3 SOLUTION RESPIRATORY (INHALATION) at 13:26

## 2015-12-28 RX ADMIN — IPRATROPIUM BROMIDE AND ALBUTEROL SULFATE SCH ML: .5; 3 SOLUTION RESPIRATORY (INHALATION) at 07:51

## 2015-12-28 RX ADMIN — LEVETIRACETAM SCH MG: 100 SOLUTION ORAL at 08:43

## 2015-12-28 RX ADMIN — LEVETIRACETAM SCH MG: 100 SOLUTION ORAL at 17:01

## 2015-12-28 RX ADMIN — NITROGLYCERIN SCH IN: 20 OINTMENT TOPICAL at 10:08

## 2015-12-28 RX ADMIN — NITROGLYCERIN SCH IN: 20 OINTMENT TOPICAL at 21:28

## 2015-12-28 RX ADMIN — IPRATROPIUM BROMIDE AND ALBUTEROL SULFATE SCH ML: .5; 3 SOLUTION RESPIRATORY (INHALATION) at 20:00

## 2015-12-28 RX ADMIN — Medication SCH ML: at 08:45

## 2015-12-28 RX ADMIN — NITROGLYCERIN SCH IN: 20 OINTMENT TOPICAL at 17:01

## 2015-12-28 RX ADMIN — IPRATROPIUM BROMIDE AND ALBUTEROL SULFATE SCH ML: .5; 3 SOLUTION RESPIRATORY (INHALATION) at 01:51

## 2015-12-28 NOTE — CP.CCUPN
CCU Subjective





- Physician Review


Events Since Last Encounter (Free Text): 


12/28/15 13:56


   Patient seen and examined, Events reviewed


   Mr. Carpenter is 55 year old male brought to ED by EMS on 12/ 08 for witnessed 

cardiac arrest. Patient reportedly went into cardiac arrest 15 minute prior to 

ALS arrival, initial found to be in pulseless V-tach. Epi administered with a 

shock in field, complete return of pulses at that time. Patient arrested a 

second time, another epi administered with shocks. Pulses returned, patient was 

intubated and transported to ED. Patient upon arrival is intubated in sinus 

tachycardia with a blood pressure of 119/70.


   Post-Resuscitation Therapeutic Hypothermia completed


   Hospital course noted for acute hypoxemic respiratory failure, aspiration 

pneumonia, anoxic encephalopathy and non-ST elevated myocardial infarction


   Responsive to verbal and tactile stimuli. 


   Orally intubated, Failed weaning trial this morning


   S/P A Fib with RVR and SVT    


   Vent setting as follows: PRVC/AC 10, Tidal Volume 500, PEEP 5, FIO2 50%. O2 

sat %.


   ABG this morning 7.45/30/136/34/98%


   Will decrease FIO2 to 40%


   Pt has not been tolerating vent weaning


   Tolerating tube feeding with Glucerna


   Afebrile


   Last 24H I/O 2630/950 (+1680)


   Patient is full code, social svs working on guardianship.























12/28/15 14:08


12/28/15 CXR 


Cardiomegaly. 


Improving vascular congestion. 


Slightly improved patchy infiltrate left mid and lower lung field.  Minor mild 

increased density right lung base


Suspect small bilateral effusions





12/28/15 14:13


cont IV Cefapime





CCU Objective





- Vital Signs / Intake & Output


Vital Signs (Last 4 hours): 


Vital Signs











  Temp Pulse Resp BP Pulse Ox


 


 12/28/15 08:00  98.2 F   18  126/80  100


 


 12/28/15 06:00   87  14  113/69  100


 


 12/28/15 05:26  98.7 F  95 H  16  127/89  100











Intake and Output (Last 8hrs): 


 Intake & Output











 12/27/15 12/28/15 12/28/15





 22:59 06:59 14:59


 


Intake Total 970 780 270


 


Output Total 500 450 


 


Balance 470 330 270


 


Intake:   


 


  Intake, Piggyback 100  


 


  Tube Feeding 420 480 120


 


  Free Water Flush 450 300 150


 


Output:   


 


  Gastric Amount 0  


 


    Stomach 0  


 


  Urine 500 450 


 


    Urethral (Mcgee) 500 450 


 


Other:   


 


  # Bowel Movements 1 1 














- Physical Exam


Head: Positive for: Atraumatic, Normocephalic


Pupils: Positive for: PERRL


Extroacular Muscles: Positive for: EOMI


Conjunctiva: Positive for: Normal.  Negative for: Injected, Icteric


Neck: Positive for: Normal Range of Motion, Trachea Midline.  Negative for: 

Meningeal Signs, MIDLINE TENDERNESS, Paraspinal Tenderness, JVD, Lymphadenopathy

, Bruit, Other


Respiratory/Chest: Positive for: Clear to Auscultation, Good Air Exchange.  

Negative for: Respiratory Distress, Accessory Muscle Use, Wheezes, Decreased 

Breath Sounds, Rales, Retracting, Rhonchi, Tachypneic, Tender to Palpation, 

Other


Cardiovascular: Positive for: Normal S1, S2, Irregular Rhythm, Peripheal Pulses 

Present.  Negative for: Murmurs


Abdomen: Positive for: Normal Bowel Sounds.  Negative for: Tenderness, 

Distention, Peritoneal Signs


Upper Extremity: Positive for: Normal Inspection.  Negative for: Cyanosis, Edema


Lower Extremity: Positive for: Normal Inspection.  Negative for: Edema, CALF 

TENDERNESS





- Medications


Active Medications: 


Active Medications











Generic Name Dose Route Start Last Admin





  Trade Name Freq  PRN Reason Stop Dose Admin


 


Acetaminophen  650 mg 12/13/15 13:21 12/26/15 16:19





  Tylenol 650mg/20.3ml Solution Ud  PO   650 mg





  Q6 PRN   Administration





  Fever   


 


Acetaminophen  650 mg 12/13/15 23:57 12/14/15 06:53





  Tylenol 650 Mg Supp  DC   650 mg





  Q6 PRN   Administration





  fevers   


 


Albuterol/Ipratropium  3 ml 12/18/15 08:00 12/28/15 07:51





  Duoneb 3 Mg/0.5 Mg (3 Ml) Ud  INH   3 ml





  RQ6 MAGDALENA   Administration


 


Aspirin  81 mg 12/13/15 13:30 12/27/15 09:14





  Ecotrin  PO   81 mg





  DAILY MAGDALENA   Administration


 


Enalapril Maleate  10 mg 12/10/15 11:15 12/27/15 09:15





  Vasotec  PO   10 mg





  DAILY MAGDALENA   Administration


 


Famotidine  20 mg 12/10/15 17:00 12/27/15 16:29





  Pepcid  GT   20 mg





  BID MAGDALENA   Administration


 


Cefepime HCl 2 gm/ Sodium  100 mls @ 100 mls/hr 12/21/15 21:00 12/27/15 20:13





  Chloride  IVPB   100 mls/hr





  Q12 MAGDALENA   Administration


 


Insulin Human Regular  0 units 12/10/15 22:00 12/27/15 21:09





  Humulin R  SC   Not Given





  [facility]   





  Protocol   


 


Levetiracetam  500 mg 12/18/15 09:00 12/27/15 16:28





  Keppra  PO   500 mg





  BID MAGDALENA   Administration


 


Lorazepam  2 mg 12/27/15 21:49 12/27/15 21:56





  Ativan  IVP   2 mg





  Q4 PRN   Administration





  Agitation   


 


Metoprolol Tartrate  100 mg 12/24/15 09:00 12/27/15 20:13





  Lopressor  PO   100 mg





  Q12 MAGDALENA   Administration


 


Morphine Sulfate  2 mg 12/27/15 21:49  





  Morphine  IVP   





  Q4 PRN   





  Agitation   


 


Multivitamins/Vitamin C  5 ml 12/10/15 09:00 12/27/15 09:15





  Multi-Delyn Liquid  PO   5 ml





  DAILY MAGDALENA   Administration


 


Nitroglycerin  2 in 12/10/15 16:00 12/28/15 05:26





  Nitro-Bid 2% Oint  TOP   2 in





  Q6 MAGDALENA   Administration














- Patient Studies


Lab Studies: 


 Lab Studies











  12/28/15 12/28/15 12/28/15 Range/Units





  06:00 04:06 04:05 


 


WBC    12.2 H  (4.8-10.8)  K/uL


 


RBC    2.86 L  (4.40-5.90)  Mil/uL


 


Hgb    9.1 L  (12.0-18.0)  g/dL


 


Hct    28.8 L  (35.0-51.0)  %


 


MCV    100.8 H  (80.0-94.0)  fl


 


MCH    31.9 H  (27.0-31.0)  pg


 


MCHC    31.6 L  (33.0-37.0)  g/dL


 


RDW    13.5  (11.5-14.5)  %


 


Plt Count    512 H  (130-400)  K/uL


 


pCO2  47 H    (35-45)  mm/Hg


 


pO2  136 H    ()  mm/Hg


 


HCO3  30.9 H    (21-28)  mmol/L


 


ABG pH  7.45    (7.35-7.45)  


 


ABG Total CO2  34.1 H    (22-28)  mmol/L


 


ABG O2 Saturation  98.7 H    (95-98)  %


 


ABG O2 Content  13.9 L    (15-23)  ML/dL


 


ABG Base Excess  7.7 H    (-2.0-3.0)  mmol/L


 


ABG Hemoglobin  10.0 L    (11.7-17.4)  g/dL


 


ABG Carboxyhemoglobin  1.1    (0.5-1.5)  %


 


POC ABG HHb (Measured)  1.3    (0.0-5.0)  %


 


ABG Methemoglobin  0.9    (0.0-3.0)  %


 


ABG O2 Capacity  14.1 L    (16-24)  mL/dL


 


Chu Test  Yes    


 


A-a O2 Difference  162.0    mm/Hg


 


Hgb O2 Saturation  96.7    (95.0-98.0)  %


 


FiO2  50.0    %


 


Sodium    143  (132-148)  mmol/l


 


Potassium    4.0  (3.6-5.0)  MMOL/L


 


Chloride    101  ()  mmol/L


 


Carbon Dioxide    32 H  (22-30)  mmol/L


 


Anion Gap    14  (10-20)  


 


BUN    19  (9-20)  mg/dl


 


Creatinine    0.9  (0.8-1.5)  mg/dL


 


Est GFR (African Amer)    > 60  


 


Est GFR (Non-Af Amer)    > 60  


 


POC Glucose (mg/dL)   116 H   ()  mg/dL


 


Random Glucose    107  ()  mg/dL


 


Calcium    9.2  (8.4-10.2)  mg/dL


 


Total Bilirubin    0.4  (0.2-1.3)  mg/dl


 


AST    46  (17-59)  U/L


 


ALT    33  (21-72)  U/L


 


Alkaline Phosphatase    150 H  ()  U/L


 


Total Protein    7.7  (6.3-8.2)  G/DL


 


Albumin    3.2 L  (3.5-5.0)  g/dL


 


Globulin    4.5 H  (2.2-3.9)  gm/dL


 


Albumin/Globulin Ratio    0.7 L  (1.0-2.1)  














  12/27/15 12/27/15 Range/Units





  20:18 10:16 


 


WBC    (4.8-10.8)  K/uL


 


RBC    (4.40-5.90)  Mil/uL


 


Hgb    (12.0-18.0)  g/dL


 


Hct    (35.0-51.0)  %


 


MCV    (80.0-94.0)  fl


 


MCH    (27.0-31.0)  pg


 


MCHC    (33.0-37.0)  g/dL


 


RDW    (11.5-14.5)  %


 


Plt Count    (130-400)  K/uL


 


pCO2    (35-45)  mm/Hg


 


pO2    ()  mm/Hg


 


HCO3    (21-28)  mmol/L


 


ABG pH    (7.35-7.45)  


 


ABG Total CO2    (22-28)  mmol/L


 


ABG O2 Saturation    (95-98)  %


 


ABG O2 Content    (15-23)  ML/dL


 


ABG Base Excess    (-2.0-3.0)  mmol/L


 


ABG Hemoglobin    (11.7-17.4)  g/dL


 


ABG Carboxyhemoglobin    (0.5-1.5)  %


 


POC ABG HHb (Measured)    (0.0-5.0)  %


 


ABG Methemoglobin    (0.0-3.0)  %


 


ABG O2 Capacity    (16-24)  mL/dL


 


Chu Test    


 


A-a O2 Difference    mm/Hg


 


Hgb O2 Saturation    (95.0-98.0)  %


 


FiO2    %


 


Sodium    (132-148)  mmol/l


 


Potassium    (3.6-5.0)  MMOL/L


 


Chloride    ()  mmol/L


 


Carbon Dioxide    (22-30)  mmol/L


 


Anion Gap    (10-20)  


 


BUN    (9-20)  mg/dl


 


Creatinine    (0.8-1.5)  mg/dL


 


Est GFR (African Amer)    


 


Est GFR (Non-Af Amer)    


 


POC Glucose (mg/dL)  88  75  ()  mg/dL


 


Random Glucose    ()  mg/dL


 


Calcium    (8.4-10.2)  mg/dL


 


Total Bilirubin    (0.2-1.3)  mg/dl


 


AST    (17-59)  U/L


 


ALT    (21-72)  U/L


 


Alkaline Phosphatase    ()  U/L


 


Total Protein    (6.3-8.2)  G/DL


 


Albumin    (3.5-5.0)  g/dL


 


Globulin    (2.2-3.9)  gm/dL


 


Albumin/Globulin Ratio    (1.0-2.1)  








 Laboratory Results - last 24 hr











  12/27/15 12/27/15 12/28/15





  10:16 20:18 04:05


 


WBC    12.2 H


 


RBC    2.86 L


 


Hgb    9.1 L


 


Hct    28.8 L


 


MCV    100.8 H


 


MCH    31.9 H


 


MCHC    31.6 L


 


RDW    13.5


 


Plt Count    512 H


 


pCO2   


 


pO2   


 


HCO3   


 


ABG pH   


 


ABG Total CO2   


 


ABG O2 Saturation   


 


ABG O2 Content   


 


ABG Base Excess   


 


ABG Hemoglobin   


 


ABG Carboxyhemoglobin   


 


POC ABG HHb (Measured)   


 


ABG Methemoglobin   


 


ABG O2 Capacity   


 


Chu Test   


 


A-a O2 Difference   


 


Hgb O2 Saturation   


 


FiO2   


 


Sodium    143


 


Potassium    4.0


 


Chloride    101


 


Carbon Dioxide    32 H


 


Anion Gap    14


 


BUN    19


 


Creatinine    0.9


 


Est GFR ( Amer)    > 60


 


Est GFR (Non-Af Amer)    > 60


 


POC Glucose (mg/dL)  75  88 


 


Random Glucose    107


 


Calcium    9.2


 


Total Bilirubin    0.4


 


AST    46


 


ALT    33


 


Alkaline Phosphatase    150 H


 


Total Protein    7.7


 


Albumin    3.2 L


 


Globulin    4.5 H


 


Albumin/Globulin Ratio    0.7 L














  12/28/15 12/28/15





  04:06 06:00


 


WBC  


 


RBC  


 


Hgb  


 


Hct  


 


MCV  


 


MCH  


 


MCHC  


 


RDW  


 


Plt Count  


 


pCO2   47 H


 


pO2   136 H


 


HCO3   30.9 H


 


ABG pH   7.45


 


ABG Total CO2   34.1 H


 


ABG O2 Saturation   98.7 H


 


ABG O2 Content   13.9 L


 


ABG Base Excess   7.7 H


 


ABG Hemoglobin   10.0 L


 


ABG Carboxyhemoglobin   1.1


 


POC ABG HHb (Measured)   1.3


 


ABG Methemoglobin   0.9


 


ABG O2 Capacity   14.1 L


 


Chu Test   Yes


 


A-a O2 Difference   162.0


 


Hgb O2 Saturation   96.7


 


FiO2   50.0


 


Sodium  


 


Potassium  


 


Chloride  


 


Carbon Dioxide  


 


Anion Gap  


 


BUN  


 


Creatinine  


 


Est GFR ( Amer)  


 


Est GFR (Non-Af Amer)  


 


POC Glucose (mg/dL)  116 H 


 


Random Glucose  


 


Calcium  


 


Total Bilirubin  


 


AST  


 


ALT  


 


Alkaline Phosphatase  


 


Total Protein  


 


Albumin  


 


Globulin  


 


Albumin/Globulin Ratio  











Fingerstick Blood Sugar Results: 88





Assessment/Plan


(1) Acute respiratory failure


Current Visit: Yes   Status: Acute


Comment: cont vent, will need trach


CXR 12/28/15 


Cardiomegaly. 


Improving vascular congestion. 


Slightly improved patchy infiltrate left mid and lower lung field.  Minor mild 

increased density right lung base


small pleural effusion














(2) Aspiration pneumonia


Current Visit: Yes   Status: Acute


Comment: CXR improving


cont IV Cefapime


BD NEBS Q 6H PRN


psudomonas in trach asp


ID f/u appretiated


Daily Vent weaning trials


likely will need trach and LTAC placement 








(3) Cardiac arrest


Current Visit: Yes   Status: Acute


Comment: Anoxic brain injury


Cont. ASA,  Vasotec and Lopressor


no cardio interventions planned at present.


likely will need trach and LTAC placement 








(4) NSTEMI (non-ST elevated myocardial infarction)


Current Visit: Yes   Status: Acute


Comment: 


Cont. ASA,  Vasotec and Lopressor








(5) Diabetes mellitus with hyperglycemia


Current Visit: Yes   Status: Chronic


Comment: riss per protocol


much more controlled, will cont ot monitor








(6) History of seizure


Current Visit: Yes   Status: Acute


Comment: cont keppra








(7) HTN (hypertension)


Current Visit: Yes   Status: Acute


Comment: 


Cont. ASA,  Vasotec and Lopressor








(8) DVT prophylaxis


Current Visit: Yes   Status: Acute


Comment: lovenox








Comment: cont anbx


mcgee cath


uro consult pending








(11) Ventricular tachycardia


Current Visit: Yes   Status: Acute

## 2015-12-28 NOTE — RAD
HISTORY:

Intubated. Technique: Single view portable semi erect @ 7:34 a.m. 



COMPARISON:

Multiple serial examinations preceding the most recent study: 

December 27, 2015. Employing similar portable technique 7:45 a.m. 



FINDINGS:



LUNGS:

Improving right lower lobe infiltrate.  Persistent multifocal upper 

lobe infiltrates.



PLEURA:

No significant pleural effusion identified, no pneumothorax apparent.



CARDIOVASCULAR:

No evidence of acute, significant cardiovascular disease. 



OSSEOUS STRUCTURES:

Severe bilateral degenerative changes both shoulders



VISUALIZED UPPER ABDOMEN:

Normal.



OTHER FINDINGS:

Stable, satisfactory position ventilatory, vascular and nasogastric 

apparatus



IMPRESSION:

Improving right lower lobe infiltrate.

## 2015-12-28 NOTE — CP.PCM.PN
Subjective





- Subjective


Subjective: 


pt remains as assessed, no distress still unable to be weaned off vent. no f/c, 

b/w noted. d/c case w/ icu attending





Review of Systems





- Review of Systems


Systems not reviewed;Unavailable: Intubated


All systems: reviewed and no additional remarkable complaints except





- Constitutional


Constitutional: UN





- EENT


Eyes: UNREMARKABLE


Ears: UNREMARKABLE


Nose/Mouth/Throat: UNREMARKABLE





- Cardiovascular


Cardiovascular: UNREMARKABLE





- Respiratory


Respiratory: As Per HPI, Chest Congestion, Excessive Mucous Production





- Gastrointestinal


Gastrointestinal: UNREMARKABLE





- Genitourinary


Genitourinary: UNREMARKABLE





- Reproductive: Male


Reproductive:Male: UNREMARKABLE





- Musculoskeletal


Musculoskeletal: UNREMARKABLE





- Integumentary


Integumentary: UNREMARKABLE





- Neurological


Neurological: UNREMARKABLE





- Psychiatric


Psychiatric: UNREMARKABLE





- Endocrine


Endocrine: UNREMARKABLE





- Hematologic/Lymphatic


Hematologic: UNREMARKABLE





Objective





- Vital Signs/Intake and Output


Vital Signs (last 24 hours): 


 Vital Signs - 24 hr











  12/27/15 12/27/15 12/27/15





  07:57 10:00 12:00


 


Temperature 98.7 F  98.3 F


 


Pulse Rate 97 H 80 80


 


Respiratory 16 19 17





Rate   


 


Blood Pressure 116/63 141/80 148/76


 


O2 Sat by Pulse 98 100 100





Oximetry   














  12/27/15 12/27/15 12/27/15





  13:21 16:00 18:00


 


Temperature  98.6 F 


 


Pulse Rate 88 90 94 H


 


Respiratory 16 13 





Rate   


 


Blood Pressure 140/90 125/74 125/79


 


O2 Sat by Pulse 100 100 





Oximetry   














  12/27/15 12/27/15 12/28/15





  20:00 22:00 00:00


 


Temperature 98.5 F  98.8 F


 


Pulse Rate 90 88 93 H


 


Respiratory 17 18 14





Rate   


 


Blood Pressure 122/69 118/76 119/71


 


O2 Sat by Pulse 100 100 100





Oximetry   














  12/28/15 12/28/15 12/28/15





  02:00 04:00 05:26


 


Temperature   98.7 F


 


Pulse Rate 89 85 95 H


 


Respiratory 20 15 16





Rate   


 


Blood Pressure 126/82 99/60 L 127/89


 


O2 Sat by Pulse 100 100 100





Oximetry   














  12/28/15





  06:00


 


Temperature 


 


Pulse Rate 87


 


Respiratory 14





Rate 


 


Blood Pressure 113/69


 


O2 Sat by Pulse 100





Oximetry 











Intake and Output (last 12 hours): 


 Intake & Output











 12/27/15 12/28/15 12/28/15





 18:59 06:59 18:59


 


Intake Total 1420 1210 


 


Output Total 500 450 


 


Balance 920 760 


 


Intake:   


 


  Intake, Piggyback 100 100 


 


  Tube Feeding 720 660 


 


  Free Water Flush 600 450 


 


Output:   


 


  Gastric Amount  0 


 


    Stomach  0 


 


  Urine 500 450 


 


    Urethral (Lua) 500 450 


 


Other:   


 


  # Bowel Movements  1 














- Medications


Medications: 


 Current Medications





Acetaminophen (Tylenol 650mg/20.3ml Solution Ud)  650 mg PO Q6 PRN


   PRN Reason: Fever


   Last Admin: 12/26/15 16:19 Dose:  650 mg


Acetaminophen (Tylenol 650 Mg Supp)  650 mg CA Q6 PRN


   PRN Reason: fevers


   Last Admin: 12/14/15 06:53 Dose:  650 mg


Albuterol/Ipratropium (Duoneb 3 Mg/0.5 Mg (3 Ml) Ud)  3 ml INH RQ6 [facility]


   Last Admin: 12/28/15 01:51 Dose:  3 ml


Aspirin (Ecotrin)  81 mg PO DAILY [facility]


   Last Admin: 12/27/15 09:14 Dose:  81 mg


Enalapril Maleate (Vasotec)  10 mg PO DAILY [facility]


   Last Admin: 12/27/15 09:15 Dose:  10 mg


Famotidine (Pepcid)  20 mg GT BID [facility]


   Last Admin: 12/27/15 16:29 Dose:  20 mg


Cefepime HCl 2 gm/ Sodium (Chloride)  100 mls @ 100 mls/hr IVPB Q12 [facility]


   Last Admin: 12/27/15 20:13 Dose:  100 mls/hr


Insulin Human Regular (Humulin R)  0 units SC ACHS MAGDALENA


   PRN Reason: Protocol


   Last Admin: 12/27/15 21:09 Dose:  Not Given


Levetiracetam (Keppra)  500 mg PO BID [facility]


   Last Admin: 12/27/15 16:28 Dose:  500 mg


Lorazepam (Ativan)  2 mg IVP Q4 PRN


   PRN Reason: Agitation


   Last Admin: 12/27/15 21:56 Dose:  2 mg


Metoprolol Tartrate (Lopressor)  100 mg PO Q12 [facility]


   Last Admin: 12/27/15 20:13 Dose:  100 mg


Morphine Sulfate (Morphine)  2 mg IVP Q4 PRN


   PRN Reason: Agitation


Multivitamins/Vitamin C (Multi-Delyn Liquid)  5 ml PO DAILY [facility]


   Last Admin: 12/27/15 09:15 Dose:  5 ml


Nitroglycerin (Nitro-Bid 2% Oint)  2 in TOP Q6 [facility]


   Last Admin: 12/28/15 05:26 Dose:  2 in











- Labs


Labs (last 24 hours): 


 Laboratory Results - last 24 hr











  12/27/15 12/27/15 12/28/15





  10:16 20:18 04:05


 


WBC    12.2 H


 


RBC    2.86 L


 


Hgb    9.1 L


 


Hct    28.8 L


 


MCV    100.8 H


 


MCH    31.9 H


 


MCHC    31.6 L


 


RDW    13.5


 


Plt Count    512 H


 


pCO2   


 


pO2   


 


HCO3   


 


ABG pH   


 


ABG Total CO2   


 


ABG O2 Saturation   


 


ABG O2 Content   


 


ABG Base Excess   


 


ABG Hemoglobin   


 


ABG Carboxyhemoglobin   


 


POC ABG HHb (Measured)   


 


ABG Methemoglobin   


 


ABG O2 Capacity   


 


Chu Test   


 


A-a O2 Difference   


 


Hgb O2 Saturation   


 


FiO2   


 


Sodium    143


 


Potassium    4.0


 


Chloride    101


 


Carbon Dioxide    32 H


 


Anion Gap    14


 


BUN    19


 


Creatinine    0.9


 


Est GFR ( Amer)    > 60


 


Est GFR (Non-Af Amer)    > 60


 


POC Glucose (mg/dL)  75  88 


 


Random Glucose    107


 


Calcium    9.2


 


Total Bilirubin    0.4


 


AST    46


 


ALT    33


 


Alkaline Phosphatase    150 H


 


Total Protein    7.7


 


Albumin    3.2 L


 


Globulin    4.5 H


 


Albumin/Globulin Ratio    0.7 L














  12/28/15 12/28/15





  04:06 06:00


 


WBC  


 


RBC  


 


Hgb  


 


Hct  


 


MCV  


 


MCH  


 


MCHC  


 


RDW  


 


Plt Count  


 


pCO2   47 H


 


pO2   136 H


 


HCO3   30.9 H


 


ABG pH   7.45


 


ABG Total CO2   34.1 H


 


ABG O2 Saturation   98.7 H


 


ABG O2 Content   13.9 L


 


ABG Base Excess   7.7 H


 


ABG Hemoglobin   10.0 L


 


ABG Carboxyhemoglobin   1.1


 


POC ABG HHb (Measured)   1.3


 


ABG Methemoglobin   0.9


 


ABG O2 Capacity   14.1 L


 


Chu Test   Yes


 


A-a O2 Difference   162.0


 


Hgb O2 Saturation   96.7


 


FiO2   50.0


 


Sodium  


 


Potassium  


 


Chloride  


 


Carbon Dioxide  


 


Anion Gap  


 


BUN  


 


Creatinine  


 


Est GFR ( Amer)  


 


Est GFR (Non-Af Amer)  


 


POC Glucose (mg/dL)  116 H 


 


Random Glucose  


 


Calcium  


 


Total Bilirubin  


 


AST  


 


ALT  


 


Alkaline Phosphatase  


 


Total Protein  


 


Albumin  


 


Globulin  


 


Albumin/Globulin Ratio  














- Constitutional


Appears: Well, Non-toxic, No Acute Distress





- Head Exam


Head Exam: ATRAUMATIC, NORMAL INSPECTION, NORMOCEPHALIC





- Eye Exam


Eye Exam: EOMI





- Respiratory Exam


Additional comments: 


scattered rhonchi, good air entry, vented





- Cardiovascular Exam


Cardiovascular Exam: REGULAR RHYTHM, RRR





- GI/Abdominal Exam


GI & Abdominal Exam: Normal Bowel Sounds, Soft, Unremarkable





- Extremities Exam


Extremities exam: full ROM, normal capillary refill, normal inspection, pedal 

pulses present





- Back Exam


Back exam: FULL ROM





- Neurological Exam


Neurological exam: Alert


Additional comments: 


responds w/ eyes





- Psychiatric Exam


Psychiatric exam: Normal Affect, Normal Mood





- Skin


Skin Exam: Dry, Intact, Normal Color, Warm





Assessment/Plan


(1) Cardiac arrest


Current Visit: Yes   Status: Acute


Comment: nsr in 80-90s


cardio on board and pt is w/o further episodes of VT


no cardio interventions planned at present.


pt only appears to become tachycardic when agitated


will need trach for optimal outcome








(2) DVT prophylaxis


Current Visit: Yes   Status: Acute


Comment: lovenox








(3) Scrotal edema


Current Visit: Yes   Status: Acute


Comment: scrotal us-noted


urology consult-dr cortes to see pt when stable








(4) History of seizure


Current Visit: Yes   Status: Acute


Comment: cont keppra








(5) Diabetes mellitus with hyperglycemia


Current Visit: Yes   Status: Chronic


Comment: riss per protocol


much more controlled, will cont ot monitor








(6) Agitation


Current Visit: Yes   Status: Acute


Comment: propofol d/c


ativan prn








(7) Aspiration pneumonia


Current Visit: Yes   Status: Acute


Comment: cipro


cont IV Cefapime


BD NEBS Q 6H PRN


f/u repeat c/s


psudomonas in trach asp


cont ID f/u


cont ID consult


cxr improving


likely will need trach placement for optimal care








(8) Acute respiratory failure


Current Visit: Yes   Status: Acute


Comment: cont vent, will need trach


CXR with B/L congesion and small pleural effusion


Will start diamox x 4 doses

## 2015-12-28 NOTE — CP.PCM.PN
Objective





- Vital Signs/Intake and Output


Vital Signs (last 24 hours): 


 Vital Signs - 24 hr











  12/27/15 12/27/15 12/27/15





  13:21 16:00 18:00


 


Temperature  98.6 F 


 


Pulse Rate 88 90 94 H


 


Respiratory 16 13 





Rate   


 


Blood Pressure 140/90 125/74 125/79


 


O2 Sat by Pulse 100 100 





Oximetry   














  12/27/15 12/27/15 12/28/15





  20:00 22:00 00:00


 


Temperature 98.5 F  98.8 F


 


Pulse Rate 90 88 93 H


 


Respiratory 17 18 14





Rate   


 


Blood Pressure 122/69 118/76 119/71


 


O2 Sat by Pulse 100 100 100





Oximetry   














  12/28/15 12/28/15 12/28/15





  02:00 04:00 05:26


 


Temperature   98.7 F


 


Pulse Rate 89 85 95 H


 


Respiratory 20 15 16





Rate   


 


Blood Pressure 126/82 99/60 L 127/89


 


O2 Sat by Pulse 100 100 100





Oximetry   














  12/28/15 12/28/15 12/28/15





  06:00 08:00 10:00


 


Temperature  98.2 F 


 


Pulse Rate 87  81


 


Respiratory 14 18 18





Rate   


 


Blood Pressure 113/69 126/80 139/86


 


O2 Sat by Pulse 100 100 100





Oximetry   











Intake and Output (last 12 hours): 


 Intake & Output











 12/27/15 12/28/15 12/28/15





 18:59 06:59 18:59


 


Intake Total 1420 1210 490


 


Output Total 500 450 


 


Balance 920 760 490


 


Intake:   


 


  Intake, Piggyback 100 100 100


 


  Tube Feeding 720 660 240


 


  Free Water Flush 600 450 150


 


Output:   


 


  Gastric Amount  0 


 


    Stomach  0 


 


  Urine 500 450 


 


    Urethral (Lua) 500 450 


 


Other:   


 


  # Bowel Movements  1 














- Medications


Medications: 


 Current Medications





Acetaminophen (Tylenol 650mg/20.3ml Solution Ud)  650 mg PO Q6 PRN


   PRN Reason: Fever


   Last Admin: 12/26/15 16:19 Dose:  650 mg


Acetaminophen (Tylenol 650 Mg Supp)  650 mg VA Q6 PRN


   PRN Reason: fevers


   Last Admin: 12/14/15 06:53 Dose:  650 mg


Albuterol/Ipratropium (Duoneb 3 Mg/0.5 Mg (3 Ml) Ud)  3 ml INH RQ6 [facility]


   Last Admin: 12/28/15 07:51 Dose:  3 ml


Aspirin (Ecotrin)  81 mg PO DAILY [facility]


   Last Admin: 12/28/15 08:43 Dose:  81 mg


Enalapril Maleate (Vasotec)  10 mg PO DAILY [facility]


   Last Admin: 12/28/15 08:45 Dose:  10 mg


Famotidine (Pepcid)  20 mg GT BID [facility]


   Last Admin: 12/28/15 08:45 Dose:  20 mg


Cefepime HCl 2 gm/ Sodium (Chloride)  100 mls @ 100 mls/hr IVPB Q12 [facility]


   Last Admin: 12/28/15 08:44 Dose:  100 mls/hr


Insulin Human Regular (Humulin R)  0 units SC ACHS [facility]


   PRN Reason: Protocol


   Last Admin: 12/28/15 08:43 Dose:  Not Given


Levetiracetam (Keppra)  500 mg PO BID [facility]


   Last Admin: 12/28/15 08:43 Dose:  500 mg


Lorazepam (Ativan)  2 mg IVP Q4 PRN


   PRN Reason: Agitation


   Last Admin: 12/28/15 12:19 Dose:  2 mg


Metoprolol Tartrate (Lopressor)  100 mg PO Q12 [facility]


   Last Admin: 12/28/15 08:46 Dose:  100 mg


Morphine Sulfate (Morphine)  2 mg IVP Q4 PRN


   PRN Reason: Agitation


   Last Admin: 12/28/15 10:17 Dose:  2 mg


Multivitamins/Vitamin C (Multi-Delyn Liquid)  5 ml PO DAILY [facility]


   Last Admin: 12/28/15 08:45 Dose:  5 ml


Nitroglycerin (Nitro-Bid 2% Oint)  2 in TOP Q6 [facility]


   Last Admin: 12/28/15 10:08 Dose:  2 in











- Labs


Labs (last 24 hours): 


 Laboratory Results - last 24 hr











  12/27/15 12/28/15 12/28/15





  20:18 04:05 04:06


 


WBC   12.2 H 


 


RBC   2.86 L 


 


Hgb   9.1 L 


 


Hct   28.8 L 


 


MCV   100.8 H 


 


MCH   31.9 H 


 


MCHC   31.6 L 


 


RDW   13.5 


 


Plt Count   512 H 


 


pCO2   


 


pO2   


 


HCO3   


 


ABG pH   


 


ABG Total CO2   


 


ABG O2 Saturation   


 


ABG O2 Content   


 


ABG Base Excess   


 


ABG Hemoglobin   


 


ABG Carboxyhemoglobin   


 


POC ABG HHb (Measured)   


 


ABG Methemoglobin   


 


ABG O2 Capacity   


 


Chu Test   


 


A-a O2 Difference   


 


Hgb O2 Saturation   


 


FiO2   


 


Sodium   143 


 


Potassium   4.0 


 


Chloride   101 


 


Carbon Dioxide   32 H 


 


Anion Gap   14 


 


BUN   19 


 


Creatinine   0.9 


 


Est GFR ( Amer)   > 60 


 


Est GFR (Non-Af Amer)   > 60 


 


POC Glucose (mg/dL)  88   116 H


 


Random Glucose   107 


 


Calcium   9.2 


 


Total Bilirubin   0.4 


 


AST   46 


 


ALT   33 


 


Alkaline Phosphatase   150 H 


 


Total Protein   7.7 


 


Albumin   3.2 L 


 


Globulin   4.5 H 


 


Albumin/Globulin Ratio   0.7 L 














  12/28/15





  06:00


 


WBC 


 


RBC 


 


Hgb 


 


Hct 


 


MCV 


 


MCH 


 


MCHC 


 


RDW 


 


Plt Count 


 


pCO2  47 H


 


pO2  136 H


 


HCO3  30.9 H


 


ABG pH  7.45


 


ABG Total CO2  34.1 H


 


ABG O2 Saturation  98.7 H


 


ABG O2 Content  13.9 L


 


ABG Base Excess  7.7 H


 


ABG Hemoglobin  10.0 L


 


ABG Carboxyhemoglobin  1.1


 


POC ABG HHb (Measured)  1.3


 


ABG Methemoglobin  0.9


 


ABG O2 Capacity  14.1 L


 


Chu Test  Yes


 


A-a O2 Difference  162.0


 


Hgb O2 Saturation  96.7


 


FiO2  50.0


 


Sodium 


 


Potassium 


 


Chloride 


 


Carbon Dioxide 


 


Anion Gap 


 


BUN 


 


Creatinine 


 


Est GFR ( Amer) 


 


Est GFR (Non-Af Amer) 


 


POC Glucose (mg/dL) 


 


Random Glucose 


 


Calcium 


 


Total Bilirubin 


 


AST 


 


ALT 


 


Alkaline Phosphatase 


 


Total Protein 


 


Albumin 


 


Globulin 


 


Albumin/Globulin Ratio 














Assessment/Plan


(1) Agitation


Current Visit: Yes   Status: Acute


Comment: propofol d/c


ativan prn








(2) Aspiration pneumonia


Current Visit: Yes   Status: Acute


Comment: cipro


cont IV Cefapime


BD NEBS Q 6H PRN


f/u repeat c/s


psudomonas in trach asp


cont ID f/u


cont ID consult


cxr improving


likely will need trach placement for optimal care








(3) Cardiac arrest


Current Visit: Yes   Status: Acute


Comment: nsr in 80-90s


cardio on board and pt is w/o further episodes of VT


no cardio interventions planned at present.


pt only appears to become tachycardic when agitated


will need trach for optimal outcome








(4) Diabetes mellitus with hyperglycemia


Current Visit: Yes   Status: Chronic


Comment: riss per protocol


much more controlled, will cont ot monitor








(5) History of seizure


Current Visit: Yes   Status: Acute


Comment: cont keppra








(6) NSTEMI (non-ST elevated myocardial infarction)


Current Visit: Yes   Status: Acute

## 2015-12-29 LAB
ALBUMIN SERPL-MCNC: 3.4 G/DL (ref 3.5–5)
ALBUMIN/GLOB SERPL: 0.8 {RATIO} (ref 1–2.1)
ALT SERPL-CCNC: 37 U/L (ref 21–72)
ARTERIAL BLOOD GAS HEMOGLOBIN: 10.2 G/DL (ref 11.7–17.4)
ARTERIAL BLOOD GAS O2 SAT: 98.8 % (ref 95–98)
ARTERIAL BLOOD GAS PCO2: 46 MM/HG (ref 35–45)
ARTERIAL BLOOD GAS TCO2: 34.9 MMOL/L (ref 22–28)
ARTERIAL PATENCY WRIST A: YES
AST SERPL-CCNC: 37 U/L (ref 17–59)
BASOPHILS # BLD AUTO: 0.1 K/UL (ref 0–0.2)
BASOPHILS NFR BLD: 0.6 % (ref 0–2)
BUN SERPL-MCNC: 20 MG/DL (ref 9–20)
CALCIUM SERPL-MCNC: 9.4 MG/DL (ref 8.4–10.2)
EOSINOPHIL # BLD AUTO: 0.7 K/UL (ref 0–0.7)
EOSINOPHIL NFR BLD: 5.2 % (ref 0–4)
ERYTHROCYTE [DISTWIDTH] IN BLOOD BY AUTOMATED COUNT: 13.1 % (ref 11.5–14.5)
GFR NON-AFRICAN AMERICAN: > 60
HCO3 BLDA-SCNC: 31.8 MMOL/L (ref 21–28)
HGB BLD-MCNC: 9.7 G/DL (ref 12–18)
INHALED O2 CONCENTRATION: 40 %
LYMPHOCYTES # BLD AUTO: 1.5 K/UL (ref 1–4.3)
LYMPHOCYTES NFR BLD AUTO: 11 % (ref 20–40)
MCH RBC QN AUTO: 31.8 PG (ref 27–31)
MCHC RBC AUTO-ENTMCNC: 31.4 G/DL (ref 33–37)
MCV RBC AUTO: 101.1 FL (ref 80–94)
MONOCYTES # BLD: 1.1 K/UL (ref 0–0.8)
MONOCYTES NFR BLD: 8.5 % (ref 0–10)
NEUTROPHILS # BLD: 10 K/UL (ref 1.8–7)
NEUTROPHILS NFR BLD AUTO: 74.7 % (ref 50–75)
NRBC BLD AUTO-RTO: 0 % (ref 0–0)
O2 CAP BLDA-SCNC: 14.3 ML/DL (ref 16–24)
O2 CT BLDA-SCNC: 14.1 ML/DL (ref 15–23)
PH BLDA: 7.47 [PH] (ref 7.35–7.45)
PLATELET # BLD: 491 K/UL (ref 130–400)
PMV BLD AUTO: 7.1 FL (ref 7.2–11.7)
PO2 BLDA: 121 MM/HG (ref 80–100)
RBC # BLD AUTO: 3.04 MIL/UL (ref 4.4–5.9)
WBC # BLD AUTO: 13.4 K/UL (ref 4.8–10.8)

## 2015-12-29 RX ADMIN — NITROGLYCERIN SCH IN: 20 OINTMENT TOPICAL at 21:16

## 2015-12-29 RX ADMIN — IPRATROPIUM BROMIDE AND ALBUTEROL SULFATE SCH ML: .5; 3 SOLUTION RESPIRATORY (INHALATION) at 01:06

## 2015-12-29 RX ADMIN — NITROGLYCERIN SCH IN: 20 OINTMENT TOPICAL at 05:00

## 2015-12-29 RX ADMIN — IPRATROPIUM BROMIDE AND ALBUTEROL SULFATE SCH ML: .5; 3 SOLUTION RESPIRATORY (INHALATION) at 19:06

## 2015-12-29 RX ADMIN — IPRATROPIUM BROMIDE AND ALBUTEROL SULFATE SCH ML: .5; 3 SOLUTION RESPIRATORY (INHALATION) at 08:01

## 2015-12-29 RX ADMIN — NITROGLYCERIN SCH IN: 20 OINTMENT TOPICAL at 11:00

## 2015-12-29 RX ADMIN — Medication SCH ML: at 08:37

## 2015-12-29 RX ADMIN — LEVETIRACETAM SCH MG: 100 SOLUTION ORAL at 16:33

## 2015-12-29 RX ADMIN — ENOXAPARIN SODIUM SCH MG: 40 INJECTION SUBCUTANEOUS at 14:11

## 2015-12-29 RX ADMIN — NITROGLYCERIN SCH: 20 OINTMENT TOPICAL at 16:35

## 2015-12-29 RX ADMIN — LEVETIRACETAM SCH MG: 100 SOLUTION ORAL at 08:37

## 2015-12-29 RX ADMIN — IPRATROPIUM BROMIDE AND ALBUTEROL SULFATE SCH ML: .5; 3 SOLUTION RESPIRATORY (INHALATION) at 13:35

## 2015-12-29 NOTE — RAD
HISTORY:

placement for mechanical ventilation tubing  



COMPARISON:

Multiple prior chest x-rays, the most recent performed 12/28/15 



TECHNIQUE:

Chest, one view.



FINDINGS:

Distal tip of right-sided PICC terminates at the expected location of 

the SVC. Distal tip of endotracheal tube terminates approximately 7.3 

cm above the level subcarina; ideal positioning is 3 cm above the 

ru. Nasogastric tube extends expected location of the stomach.



LUNGS:

Incomplete visualization of the superior most lung apices. Persistent 

multifocal upper lobe infiltrates, overall decreased in extent from 

most prominent on the left. 



Please note that chest x-ray has limited sensitivity for the 

detection of pulmonary masses.



PLEURA:

No significant pleural effusion identified. No definite pneumothorax .



CARDIOVASCULAR:

Stable.



OSSEOUS STRUCTURES:

Osseous demineralization.  Degenerative changes of the spine and 

shoulders.



VISUALIZED UPPER ABDOMEN:

Unremarkable.



OTHER FINDINGS:

None.



IMPRESSION:

Distal tip of endotracheal tube terminates approximately 7.3 cm above 

the level of the ru; ideal positioning is 3 cm above the ru. 

Right-sided PICC and nasogastric tube appear in satisfactory 

position. 



Persistent multifocal upper lobe infiltrates, overall decreased in 

extent from most prominent on the left.

## 2015-12-29 NOTE — CP.PCM.PN
Subjective





- Subjective


Subjective: 


  pt remains unweanable from vent 





Review of Systems





- Review of Systems


Systems not reviewed;Unavailable: Altered Mental Status





Objective





- Vital Signs/Intake and Output


Vital Signs (last 24 hours): 


 Vital Signs - 24 hr











  12/28/15 12/28/15 12/28/15





  18:00 19:30 20:00


 


Temperature  100.2 F H 100.2 F H


 


Pulse Rate 99 H 101 H 103 H


 


Respiratory 19 18 16





Rate   


 


Blood Pressure 129/90 138/97 H 144/87


 


O2 Sat by Pulse 100 96 96





Oximetry   














  12/28/15 12/29/15 12/29/15





  22:00 00:00 02:00


 


Temperature 100.2 F H 100.2 F H 100.2 F H


 


Pulse Rate 94 H 92 H 88


 


Respiratory 22 14 13





Rate   


 


Blood Pressure 122/70 139/76 114/68


 


O2 Sat by Pulse 96 100 99





Oximetry   














  12/29/15 12/29/15 12/29/15





  03:31 04:33 06:00


 


Temperature 100.3 F H 99.9 F H 99.8 F H


 


Pulse Rate 100 H 103 H 94 H


 


Respiratory 15 15 18





Rate   


 


Blood Pressure 145/79 112/67 161/104 H


 


O2 Sat by Pulse 99 97 97





Oximetry   














  12/29/15 12/29/15 12/29/15





  08:00 10:00 12:00


 


Temperature 98.7 F  99.7 F H


 


Pulse Rate 105 H 105 H 111 H


 


Respiratory 19 15 24





Rate   


 


Blood Pressure 113/65 99/63 L 130/80


 


O2 Sat by Pulse 98 100 100





Oximetry   














  12/29/15 12/29/15 12/29/15





  14:00 15:00 16:00


 


Temperature   98.8 F


 


Pulse Rate 108 H 108 H 104 H


 


Respiratory 12 19 13





Rate   


 


Blood Pressure 84/58 L 124/79 94/59 L


 


O2 Sat by Pulse 96 98 99





Oximetry   











Intake and Output (last 12 hours): 


 Intake & Output











 12/28/15 12/29/15 12/29/15





 18:59 06:59 18:59


 


Intake Total 1120 1250 1160


 


Output Total 500 662 


 


Balance 


 


Intake:   


 


  IV  70 10


 


  Intake, Piggyback 100 100 100


 


  Oral 120 60 


 


  Tube Feeding 600 720 600


 


  Free Water Flush 300 300 450


 


Output:   


 


  Gastric Amount  12 


 


    Stomach  12 


 


  Urine 500 545 


 


    Urethral (Lua) 500 545 


 


  Stool  105 


 


Other:   


 


  # Bowel Movements 1  1














- Medications


Medications: 


 Current Medications





Albuterol/Ipratropium (Duoneb 3 Mg/0.5 Mg (3 Ml) Ud)  3 ml INH RQ6 [facility]


   Last Admin: 12/29/15 13:35 Dose:  3 ml


Enalapril Maleate (Vasotec)  10 mg PO DAILY [facility]


   Last Admin: 12/29/15 08:36 Dose:  10 mg


Enoxaparin Sodium (Lovenox)  40 mg SC DAILY [facility]


   Last Admin: 12/29/15 14:11 Dose:  40 mg


Famotidine (Pepcid)  20 mg GT BID [facility]


   Last Admin: 12/29/15 16:33 Dose:  20 mg


Cefepime HCl 2 gm/ Sodium (Chloride)  100 mls @ 100 mls/hr IVPB Q12 [facility]


   Last Admin: 12/29/15 08:37 Dose:  100 mls/hr


Insulin Human Regular (Humulin R)  0 units SC ACBD [facility]


   PRN Reason: Protocol


   Last Admin: 12/29/15 16:34 Dose:  Not Given


Levetiracetam (Keppra)  500 mg PO BID [facility]


   Last Admin: 12/29/15 16:33 Dose:  500 mg


Lorazepam (Ativan)  2 mg IVP Q4 PRN


   PRN Reason: Agitation


   Last Admin: 12/29/15 12:00 Dose:  2 mg


Metoprolol Tartrate (Lopressor)  100 mg PO Q12 [facility]


   Last Admin: 12/29/15 08:36 Dose:  100 mg


Morphine Sulfate (Morphine)  2 mg IVP Q4 PRN


   PRN Reason: Agitation


   Last Admin: 12/29/15 15:16 Dose:  2 mg


Multivitamins/Vitamin C (Multi-Delyn Liquid)  5 ml PO DAILY [facility]


   Last Admin: 12/29/15 08:37 Dose:  5 ml


Nitroglycerin (Nitro-Bid 2% Oint)  2 in TOP Q6 [facility]


   Last Admin: 12/29/15 16:35 Dose:  Not Given











- Labs


Labs (last 24 hours): 


 Laboratory Results - last 24 hr











  12/28/15 12/29/15 12/29/15





  21:20 04:00 04:52


 


WBC   13.4 H 


 


RBC   3.04 L 


 


Hgb   9.7 L 


 


Hct   30.7 L 


 


MCV   101.1 H 


 


MCH   31.8 H 


 


MCHC   31.4 L 


 


RDW   13.1 


 


Plt Count   491 H 


 


MPV   7.1 L 


 


Neut % (Auto)   74.7 


 


Lymph % (Auto)   11.0 L 


 


Mono % (Auto)   8.5 


 


Eos % (Auto)   5.2 H 


 


Baso % (Auto)   0.6 


 


Neut #   10.0 H 


 


Lymph #   1.5 


 


Mono #   1.1 H 


 


Eos #   0.7 


 


Baso #   0.1 


 


pCO2    46 H


 


pO2    121 H


 


HCO3    31.8 H


 


ABG pH    7.47 H


 


ABG Total CO2    34.9 H


 


ABG O2 Saturation    98.8 H


 


ABG O2 Content    14.1 L


 


ABG Base Excess    8.8 H


 


ABG Hemoglobin    10.2 L


 


ABG Carboxyhemoglobin    1.4


 


POC ABG HHb (Measured)    1.2


 


ABG Methemoglobin    0.9


 


ABG O2 Capacity    14.3 L


 


Chu Test    Yes


 


A-a O2 Difference    107.0


 


Hgb O2 Saturation    96.5


 


FiO2    40.0


 


Blood Gas Comments    rn paige


 


Sodium   142 


 


Potassium   4.1 


 


Chloride   101 


 


Carbon Dioxide   32 H 


 


Anion Gap   13 


 


BUN   20 


 


Creatinine   0.8 


 


Est GFR ( Amer)   > 60 


 


Est GFR (Non-Af Amer)   > 60 


 


POC Glucose (mg/dL)  105  


 


Random Glucose   117 H 


 


Calcium   9.4 


 


Total Bilirubin   0.4 


 


AST   37 


 


ALT   37 


 


Alkaline Phosphatase   159 H 


 


Total Protein   7.9 


 


Albumin   3.4 L 


 


Globulin   4.5 H 


 


Albumin/Globulin Ratio   0.8 L 














  12/29/15 12/29/15





  05:33 16:33


 


WBC  


 


RBC  


 


Hgb  


 


Hct  


 


MCV  


 


MCH  


 


MCHC  


 


RDW  


 


Plt Count  


 


MPV  


 


Neut % (Auto)  


 


Lymph % (Auto)  


 


Mono % (Auto)  


 


Eos % (Auto)  


 


Baso % (Auto)  


 


Neut #  


 


Lymph #  


 


Mono #  


 


Eos #  


 


Baso #  


 


pCO2  


 


pO2  


 


HCO3  


 


ABG pH  


 


ABG Total CO2  


 


ABG O2 Saturation  


 


ABG O2 Content  


 


ABG Base Excess  


 


ABG Hemoglobin  


 


ABG Carboxyhemoglobin  


 


POC ABG HHb (Measured)  


 


ABG Methemoglobin  


 


ABG O2 Capacity  


 


Chu Test  


 


A-a O2 Difference  


 


Hgb O2 Saturation  


 


FiO2  


 


Blood Gas Comments  


 


Sodium  


 


Potassium  


 


Chloride  


 


Carbon Dioxide  


 


Anion Gap  


 


BUN  


 


Creatinine  


 


Est GFR ( Amer)  


 


Est GFR (Non-Af Amer)  


 


POC Glucose (mg/dL)  118 H  118 H


 


Random Glucose  


 


Calcium  


 


Total Bilirubin  


 


AST  


 


ALT  


 


Alkaline Phosphatase  


 


Total Protein  


 


Albumin  


 


Globulin  


 


Albumin/Globulin Ratio  














- Constitutional


Appears: Non-toxic, Chronically Ill





- Head Exam


Head Exam: NORMOCEPHALIC





- Eye Exam


Eye Exam: absent: Scleral icterus





- Neck Exam


Neck exam: absent: Lymphadenopathy





- Respiratory Exam


Respiratory Exam: Decreased Breath Sounds, Rhonchi





- Cardiovascular Exam


Cardiovascular Exam: REGULAR RHYTHM, +S1, +S2





- GI/Abdominal Exam


GI & Abdominal Exam: Diminished Bowel Sounds, Soft.  absent: Tenderness





- Rectal Exam


Rectal Exam: Deferred





Assessment/Plan


(1) Agitation


Current Visit: Yes   Status: Acute


Comment: morphine prn


ativan prn








(2) Aspiration pneumonia


Current Visit: Yes   Status: Acute


Comment: CXR improving


cont IV Cefapime


BD NEBS Q 6H PRN


psudomonas in trach asp


ID f/u appretiated


Daily Vent weaning trials


likely will need trach and LTAC placement 








(3) Cardiac arrest


Current Visit: Yes   Status: Acute


Comment: Anoxic brain injury


Cont. ASA,  Vasotec and Lopressor


no cardio interventions planned at present.


likely will need trach and LTAC placement 








(4) Diabetes mellitus with hyperglycemia


Current Visit: Yes   Status: Chronic


Comment: riss per protocol


much more controlled, will cont ot monitor








(5) History of seizure


Current Visit: Yes   Status: Acute


Comment: 


Cont. keppra 500 mg BID


Seizure Precatuion








(6) NSTEMI (non-ST elevated myocardial infarction)


Current Visit: Yes   Status: Acute


Comment: 


Cont. ASA,  Vasotec and Lopressor

## 2015-12-29 NOTE — CP.PCM.PN
Subjective





- Subjective


Subjective: 


pt comfortable in bed, recently received sedation for agitation-pulling at 

tubing and trying to get out of bed.  pt remains unweanable from vent as he 

gets more agitated, restess and tachycardic.  cardiac arrythmia during night 

shift noted.  at present in nsr. d/c case fully w/ icu attending. 


all forms for ss filled out.





Review of Systems





- Review of Systems


Systems not reviewed;Unavailable: Intubated





- Constitutional


Constitutional: UN





- EENT


Eyes: UNREMARKABLE


Ears: UNREMARKABLE


Nose/Mouth/Throat: UNREMARKABLE





- Cardiovascular


Cardiovascular: UNREMARKABLE





- Respiratory


Respiratory: As Per HPI, Chest Congestion, Excessive Mucous Production





- Gastrointestinal


Gastrointestinal: UNREMARKABLE





- Genitourinary


Genitourinary: UNREMARKABLE





- Reproductive: Male


Reproductive:Male: UNREMARKABLE





- Musculoskeletal


Musculoskeletal: UNREMARKABLE





- Integumentary


Integumentary: UNREMARKABLE





- Neurological


Neurological: UNREMARKABLE





- Psychiatric


Psychiatric: UNREMARKABLE





- Endocrine


Endocrine: UNREMARKABLE





- Hematologic/Lymphatic


Hematologic: UNREMARKABLE





Objective





- Vital Signs/Intake and Output


Vital Signs (last 24 hours): 


 Vital Signs - 24 hr











  12/28/15 12/28/15 12/28/15





  10:00 12:00 14:00


 


Temperature   


 


Pulse Rate 81 86 87


 


Respiratory 18 19 18





Rate   


 


Blood Pressure 139/86 126/80 122/82


 


O2 Sat by Pulse 100 100 100





Oximetry   














  12/28/15 12/28/15 12/28/15





  16:00 18:00 19:30


 


Temperature   100.2 F H


 


Pulse Rate 80 99 H 101 H


 


Respiratory 18 19 18





Rate   


 


Blood Pressure 103/66 129/90 138/97 H


 


O2 Sat by Pulse 100 100 96





Oximetry   














  12/28/15 12/28/15 12/29/15





  20:00 22:00 00:00


 


Temperature 100.2 F H 100.2 F H 100.2 F H


 


Pulse Rate 103 H 94 H 92 H


 


Respiratory 16 22 14





Rate   


 


Blood Pressure 144/87 122/70 139/76


 


O2 Sat by Pulse 96 96 100





Oximetry   














  12/29/15 12/29/15 12/29/15





  02:00 03:31 04:33


 


Temperature 100.2 F H 100.3 F H 99.9 F H


 


Pulse Rate 88 100 H 103 H


 


Respiratory 13 15 15





Rate   


 


Blood Pressure 114/68 145/79 112/67


 


O2 Sat by Pulse 99 99 97





Oximetry   














  12/29/15





  06:00


 


Temperature 99.8 F H


 


Pulse Rate 94 H


 


Respiratory 18





Rate 


 


Blood Pressure 161/104 H


 


O2 Sat by Pulse 97





Oximetry 











Intake and Output (last 12 hours): 


 Intake & Output











 12/28/15 12/29/15 12/29/15





 18:59 06:59 18:59


 


Intake Total 1120 1250 280


 


Output Total 500 662 


 


Balance 620 588 280


 


Intake:   


 


  IV  70 10


 


  Intake, Piggyback 100 100 


 


  Oral 120 60 


 


  Tube Feeding 600 720 120


 


  Free Water Flush 300 300 150


 


Output:   


 


  Gastric Amount  12 


 


    Stomach  12 


 


  Urine 500 545 


 


    Urethral (Lua) 500 545 


 


  Stool  105 


 


Other:   


 


  # Bowel Movements 1  














- Medications


Medications: 


 Current Medications





Albuterol/Ipratropium (Duoneb 3 Mg/0.5 Mg (3 Ml) Ud)  3 ml INH RQ6 [facility]


   Last Admin: 12/29/15 08:01 Dose:  3 ml


Enalapril Maleate (Vasotec)  10 mg PO DAILY [facility]


   Last Admin: 12/28/15 08:45 Dose:  10 mg


Enoxaparin Sodium (Lovenox)  40 mg SC DAILY [facility]


Famotidine (Pepcid)  20 mg GT BID [facility]


   Last Admin: 12/28/15 17:02 Dose:  20 mg


Cefepime HCl 2 gm/ Sodium (Chloride)  100 mls @ 100 mls/hr IVPB Q12 [facility]


   Last Admin: 12/28/15 21:22 Dose:  100 mls/hr


Insulin Human Regular (Humulin R)  0 units SC BID MAGDALENA


   PRN Reason: Protocol


Levetiracetam (Keppra)  500 mg PO BID [facility]


   Last Admin: 12/28/15 17:01 Dose:  500 mg


Lorazepam (Ativan)  2 mg IVP Q4 PRN


   PRN Reason: Agitation


   Last Admin: 12/29/15 03:22 Dose:  2 mg


Metoprolol Tartrate (Lopressor)  100 mg PO Q12 [facility]


   Last Admin: 12/28/15 21:22 Dose:  100 mg


Morphine Sulfate (Morphine)  2 mg IVP Q4 PRN


   PRN Reason: Agitation


   Last Admin: 12/29/15 04:22 Dose:  2 mg


Multivitamins/Vitamin C (Multi-Delyn Liquid)  5 ml PO DAILY [facility]


   Last Admin: 12/28/15 08:45 Dose:  5 ml


Nitroglycerin (Nitro-Bid 2% Oint)  2 in TOP Q6 [facility]


   Last Admin: 12/29/15 05:00 Dose:  2 in











- Labs


Labs (last 24 hours): 


 Laboratory Results - last 24 hr











  12/28/15 12/29/15 12/29/15





  21:20 04:00 04:52


 


WBC   13.4 H 


 


RBC   3.04 L 


 


Hgb   9.7 L 


 


Hct   30.7 L 


 


MCV   101.1 H 


 


MCH   31.8 H 


 


MCHC   31.4 L 


 


RDW   13.1 


 


Plt Count   491 H 


 


MPV   7.1 L 


 


Neut % (Auto)   74.7 


 


Lymph % (Auto)   11.0 L 


 


Mono % (Auto)   8.5 


 


Eos % (Auto)   5.2 H 


 


Baso % (Auto)   0.6 


 


Neut #   10.0 H 


 


Lymph #   1.5 


 


Mono #   1.1 H 


 


Eos #   0.7 


 


Baso #   0.1 


 


pCO2    46 H


 


pO2    121 H


 


HCO3    31.8 H


 


ABG pH    7.47 H


 


ABG Total CO2    34.9 H


 


ABG O2 Saturation    98.8 H


 


ABG O2 Content    14.1 L


 


ABG Base Excess    8.8 H


 


ABG Hemoglobin    10.2 L


 


ABG Carboxyhemoglobin    1.4


 


POC ABG HHb (Measured)    1.2


 


ABG Methemoglobin    0.9


 


ABG O2 Capacity    14.3 L


 


Chu Test    Yes


 


A-a O2 Difference    107.0


 


Hgb O2 Saturation    96.5


 


FiO2    40.0


 


Blood Gas Comments    rn piage


 


Sodium   142 


 


Potassium   4.1 


 


Chloride   101 


 


Carbon Dioxide   32 H 


 


Anion Gap   13 


 


BUN   20 


 


Creatinine   0.8 


 


Est GFR ( Amer)   > 60 


 


Est GFR (Non-Af Amer)   > 60 


 


POC Glucose (mg/dL)  105  


 


Random Glucose   117 H 


 


Calcium   9.4 


 


Total Bilirubin   0.4 


 


AST   37 


 


ALT   37 


 


Alkaline Phosphatase   159 H 


 


Total Protein   7.9 


 


Albumin   3.4 L 


 


Globulin   4.5 H 


 


Albumin/Globulin Ratio   0.8 L 














  12/29/15





  05:33


 


WBC 


 


RBC 


 


Hgb 


 


Hct 


 


MCV 


 


MCH 


 


MCHC 


 


RDW 


 


Plt Count 


 


MPV 


 


Neut % (Auto) 


 


Lymph % (Auto) 


 


Mono % (Auto) 


 


Eos % (Auto) 


 


Baso % (Auto) 


 


Neut # 


 


Lymph # 


 


Mono # 


 


Eos # 


 


Baso # 


 


pCO2 


 


pO2 


 


HCO3 


 


ABG pH 


 


ABG Total CO2 


 


ABG O2 Saturation 


 


ABG O2 Content 


 


ABG Base Excess 


 


ABG Hemoglobin 


 


ABG Carboxyhemoglobin 


 


POC ABG HHb (Measured) 


 


ABG Methemoglobin 


 


ABG O2 Capacity 


 


Chu Test 


 


A-a O2 Difference 


 


Hgb O2 Saturation 


 


FiO2 


 


Blood Gas Comments 


 


Sodium 


 


Potassium 


 


Chloride 


 


Carbon Dioxide 


 


Anion Gap 


 


BUN 


 


Creatinine 


 


Est GFR ( Amer) 


 


Est GFR (Non-Af Amer) 


 


POC Glucose (mg/dL)  118 H


 


Random Glucose 


 


Calcium 


 


Total Bilirubin 


 


AST 


 


ALT 


 


Alkaline Phosphatase 


 


Total Protein 


 


Albumin 


 


Globulin 


 


Albumin/Globulin Ratio 














- Constitutional


Appears: Non-toxic, No Acute Distress, Chronically Ill





- Head Exam


Head Exam: ATRAUMATIC, NORMAL INSPECTION, NORMOCEPHALIC





- Eye Exam


Eye Exam: EOMI





- Respiratory Exam


Respiratory Exam: Clear to PA & Lateral, Rhonchi


Additional comments: 


diffuse rhonchi/ clear lungs sounds, vented, sedated





- Cardiovascular Exam


Cardiovascular Exam: REGULAR RHYTHM, RRR





- GI/Abdominal Exam


GI & Abdominal Exam: Normal Bowel Sounds, Soft, Unremarkable





- Extremities Exam


Extremities exam: full ROM, normal capillary refill, normal inspection, pedal 

pulses present





- Back Exam


Back exam: FULL ROM





- Neurological Exam


Neurological exam: Alert, CN II-XII Intact, Normal Gait, Oriented x3, Reflexes 

Normal





- Psychiatric Exam


Psychiatric exam: Normal Affect, Normal Mood





- Skin


Skin Exam: Dry, Intact, Normal Color, Warm





Assessment/Plan


(1) Cardiac arrest


Current Visit: Yes   Status: Acute


Comment: Anoxic brain injury


Cont. ASA,  Vasotec and Lopressor


no cardio interventions planned at present.


likely will need trach and LTAC placement 








(2) DVT prophylaxis


Current Visit: Yes   Status: Acute


Comment: 


SCDs and sage renew lovenox








(3) Scrotal edema


Current Visit: Yes   Status: Acute


Comment: scrotal us-noted


urology consult-dr cortes to see pt when stable








(4) History of seizure


Current Visit: Yes   Status: Acute


Comment: 


Cont. keppra 500 mg BID


Seizure Precatuion








(5) Diabetes mellitus with hyperglycemia


Current Visit: Yes   Status: Chronic


Comment: riss per protocol


much more controlled, will cont ot monitor








(6) Agitation


Current Visit: Yes   Status: Acute


Comment: morphine prn


ativan prn








(7) Aspiration pneumonia


Current Visit: Yes   Status: Acute


Comment: CXR improving


cont IV Cefapime


BD NEBS Q 6H PRN


psudomonas in trach asp


ID f/u appretiated


Daily Vent weaning trials


likely will need trach and LTAC placement 








(8) Acute respiratory failure


Current Visit: Yes   Status: Acute


Comment: cont vent, will need trach


CXR 12/28/15 


Cardiomegaly. 


Improving vascular congestion. 


Slightly improved patchy infiltrate left mid and lower lung field.  Minor mild 

increased density right lung base


small pleural effusion

## 2015-12-29 NOTE — CP.CCUPN
CCU Subjective





- Physician Review


Events Since Last Encounter (Free Text): 


12/29/15 07:51


   Patient seen and examined, Events reviewed


   Agitated overmight, pulling on the tubes and mcgee, Received IV Morphine 

and Ativan


   Mr. Carpenter is 55 year old male brought to ED by EMS on 12/ 08 for witnessed 

cardiac arrest. Patient reportedly went into cardiac arrest 15 minute prior to 

ALS arrival, initial found to be in pulseless V-tach. Epi administered with a 

shock in field, complete return of pulses at that time. Patient arrested a 

second time, another epi administered with shocks. Pulses returned, patient was 

intubated and transported to ED. Patient upon arrival is intubated in sinus 

tachycardia with a blood pressure of 119/70.


   Post-Resuscitation Therapeutic Hypothermia completed


   Hospital course noted for acute hypoxemic respiratory failure S/P failed 

extubation, 


aspiration pneumonia, anoxic encephalopathy and non-ST elevated myocardial 

infarction


   Patient awake, responsive to verbal and tactile stimuli. 


   Orally intubated, Failed weaning trial this morning


   S/P A Fib with RVR and SVT    


   Vent setting as follows: PRVC/AC 10, Tidal Volume 500, PEEP 5, FIO2 40%. O2 

sat %.


   ABG this morning 7.47/46/121/31.8/98.8%


   Will increase RR to 12


   Pt has not been tolerating vent weaning


   Tolerating tube feeding with Glucerna


   Afebrile


   Last 24H I/O 2370/1160 (+1200)


   Patient is full code, social svs working on guardianship.














CCU Objective





- Vital Signs / Intake & Output


Vital Signs (Last 4 hours): 


Vital Signs











  Temp Pulse Resp BP Pulse Ox


 


 12/29/15 06:00  99.8 F H  94 H  18  161/104 H  97


 


 12/29/15 04:33  99.9 F H  103 H  15  112/67  97











Intake and Output (Last 8hrs): 


 Intake & Output











 12/28/15 12/29/15 12/29/15





 22:59 06:59 14:59


 


Intake Total 690 800 280


 


Output Total 705 457 


 


Balance -15 343 280


 


Intake:   


 


  IV 20 50 10


 


  Intake, Piggyback 100  


 


  Oral 180  


 


  Tube Feeding 240 600 120


 


  Free Water Flush 150 150 150


 


Output:   


 


  Gastric Amount  12 


 


    Stomach  12 


 


  Urine 600 445 


 


    Urethral (Mcgee) 600 445 


 


  Stool 105  


 


Other:   


 


  # Bowel Movements 1  














- Physical Exam


Head: Positive for: Atraumatic, Normocephalic


Pupils: Positive for: PERRL


Extroacular Muscles: Positive for: EOMI


Conjunctiva: Positive for: Normal.  Negative for: Injected, Icteric


Neck: Positive for: Normal Range of Motion, Trachea Midline.  Negative for: 

Meningeal Signs, MIDLINE TENDERNESS, Paraspinal Tenderness, JVD, Lymphadenopathy

, Bruit, Other


Respiratory/Chest: Positive for: Clear to Auscultation, Good Air Exchange.  

Negative for: Respiratory Distress, Accessory Muscle Use, Wheezes, Decreased 

Breath Sounds, Rales, Retracting, Rhonchi, Tachypneic, Tender to Palpation, 

Other


Cardiovascular: Positive for: Normal S1, S2, Irregular Rhythm, Peripheal Pulses 

Present.  Negative for: Murmurs


Abdomen: Positive for: Normal Bowel Sounds.  Negative for: Tenderness, 

Distention, Peritoneal Signs


Upper Extremity: Positive for: Normal Inspection.  Negative for: Cyanosis, Edema


Lower Extremity: Positive for: Normal Inspection.  Negative for: Edema, CALF 

TENDERNESS





- Medications


Active Medications: 


Active Medications











Generic Name Dose Route Start Last Admin





  Trade Name Freq  PRN Reason Stop Dose Admin


 


Albuterol/Ipratropium  3 ml 12/18/15 08:00 12/29/15 01:06





  Duoneb 3 Mg/0.5 Mg (3 Ml) Ud  INH   3 ml





  RQ6 MAGDALENA   Administration


 


Enalapril Maleate  10 mg 12/10/15 11:15 12/28/15 08:45





  Vasotec  PO   10 mg





  DAILY MAGDALENA   Administration


 


Famotidine  20 mg 12/10/15 17:00 12/28/15 17:02





  Pepcid  GT   20 mg





  BID MAGDALENA   Administration


 


Cefepime HCl 2 gm/ Sodium  100 mls @ 100 mls/hr 12/21/15 21:00 12/28/15 21:22





  Chloride  IVPB   100 mls/hr





  Q12 MAGDALENA   Administration


 


Insulin Human Regular  0 units 12/29/15 17:00  





  Humulin R  SC   





  BID MAGDALENA   





  Protocol   


 


Levetiracetam  500 mg 12/18/15 09:00 12/28/15 17:01





  Keppra  PO   500 mg





  BID MAGDALENA   Administration


 


Lorazepam  2 mg 12/27/15 21:49 12/29/15 03:22





  Ativan  IVP   2 mg





  Q4 PRN   Administration





  Agitation   


 


Metoprolol Tartrate  100 mg 12/24/15 09:00 12/28/15 21:22





  Lopressor  PO   100 mg





  Q12 MAGDALENA   Administration


 


Morphine Sulfate  2 mg 12/27/15 21:49 12/29/15 04:22





  Morphine  IVP   2 mg





  Q4 PRN   Administration





  Agitation   


 


Multivitamins/Vitamin C  5 ml 12/10/15 09:00 12/28/15 08:45





  Multi-Delyn Liquid  PO   5 ml





  DAILY MAGDALENA   Administration


 


Nitroglycerin  2 in 12/10/15 16:00 12/29/15 05:00





  Nitro-Bid 2% Oint  TOP   2 in





  Q6 MAGDALENA   Administration














- Patient Studies


Lab Studies: 


 Lab Studies











  12/29/15 12/29/15 12/29/15 Range/Units





  05:33 04:52 04:00 


 


WBC    13.4 H  (4.8-10.8)  K/uL


 


RBC    3.04 L  (4.40-5.90)  Mil/uL


 


Hgb    9.7 L  (12.0-18.0)  g/dL


 


Hct    30.7 L  (35.0-51.0)  %


 


MCV    101.1 H  (80.0-94.0)  fl


 


MCH    31.8 H  (27.0-31.0)  pg


 


MCHC    31.4 L  (33.0-37.0)  g/dL


 


RDW    13.1  (11.5-14.5)  %


 


Plt Count    491 H  (130-400)  K/uL


 


MPV    7.1 L  (7.2-11.7)  fl


 


Neut % (Auto)    74.7  (50.0-75.0)  %


 


Lymph % (Auto)    11.0 L  (20.0-40.0)  %


 


Mono % (Auto)    8.5  (0.0-10.0)  %


 


Eos % (Auto)    5.2 H  (0.0-4.0)  %


 


Baso % (Auto)    0.6  (0.0-2.0)  %


 


Neut #    10.0 H  (1.8-7.0)  K/uL


 


Lymph #    1.5  (1.0-4.3)  K/uL


 


Mono #    1.1 H  (0.0-0.8)  K/uL


 


Eos #    0.7  (0.0-0.7)  K/uL


 


Baso #    0.1  (0.0-0.2)  K/uL


 


pCO2   46 H   (35-45)  mm/Hg


 


pO2   121 H   ()  mm/Hg


 


HCO3   31.8 H   (21-28)  mmol/L


 


ABG pH   7.47 H   (7.35-7.45)  


 


ABG Total CO2   34.9 H   (22-28)  mmol/L


 


ABG O2 Saturation   98.8 H   (95-98)  %


 


ABG O2 Content   14.1 L   (15-23)  ML/dL


 


ABG Base Excess   8.8 H   (-2.0-3.0)  mmol/L


 


ABG Hemoglobin   10.2 L   (11.7-17.4)  g/dL


 


ABG Carboxyhemoglobin   1.4   (0.5-1.5)  %


 


POC ABG HHb (Measured)   1.2   (0.0-5.0)  %


 


ABG Methemoglobin   0.9   (0.0-3.0)  %


 


ABG O2 Capacity   14.3 L   (16-24)  mL/dL


 


Chu Test   Yes   


 


A-a O2 Difference   107.0   mm/Hg


 


Hgb O2 Saturation   96.5   (95.0-98.0)  %


 


FiO2   40.0   %


 


Blood Gas Comments   rn paige   


 


Sodium    142  (132-148)  mmol/l


 


Potassium    4.1  (3.6-5.0)  MMOL/L


 


Chloride    101  ()  mmol/L


 


Carbon Dioxide    32 H  (22-30)  mmol/L


 


Anion Gap    13  (10-20)  


 


BUN    20  (9-20)  mg/dl


 


Creatinine    0.8  (0.8-1.5)  mg/dL


 


Est GFR (African Amer)    > 60  


 


Est GFR (Non-Af Amer)    > 60  


 


POC Glucose (mg/dL)  118 H    ()  mg/dL


 


Random Glucose    117 H  ()  mg/dL


 


Calcium    9.4  (8.4-10.2)  mg/dL


 


Total Bilirubin    0.4  (0.2-1.3)  mg/dl


 


AST    37  (17-59)  U/L


 


ALT    37  (21-72)  U/L


 


Alkaline Phosphatase    159 H  ()  U/L


 


Total Protein    7.9  (6.3-8.2)  G/DL


 


Albumin    3.4 L  (3.5-5.0)  g/dL


 


Globulin    4.5 H  (2.2-3.9)  gm/dL


 


Albumin/Globulin Ratio    0.8 L  (1.0-2.1)  














  12/28/15 Range/Units





  21:20 


 


WBC   (4.8-10.8)  K/uL


 


RBC   (4.40-5.90)  Mil/uL


 


Hgb   (12.0-18.0)  g/dL


 


Hct   (35.0-51.0)  %


 


MCV   (80.0-94.0)  fl


 


MCH   (27.0-31.0)  pg


 


MCHC   (33.0-37.0)  g/dL


 


RDW   (11.5-14.5)  %


 


Plt Count   (130-400)  K/uL


 


MPV   (7.2-11.7)  fl


 


Neut % (Auto)   (50.0-75.0)  %


 


Lymph % (Auto)   (20.0-40.0)  %


 


Mono % (Auto)   (0.0-10.0)  %


 


Eos % (Auto)   (0.0-4.0)  %


 


Baso % (Auto)   (0.0-2.0)  %


 


Neut #   (1.8-7.0)  K/uL


 


Lymph #   (1.0-4.3)  K/uL


 


Mono #   (0.0-0.8)  K/uL


 


Eos #   (0.0-0.7)  K/uL


 


Baso #   (0.0-0.2)  K/uL


 


pCO2   (35-45)  mm/Hg


 


pO2   ()  mm/Hg


 


HCO3   (21-28)  mmol/L


 


ABG pH   (7.35-7.45)  


 


ABG Total CO2   (22-28)  mmol/L


 


ABG O2 Saturation   (95-98)  %


 


ABG O2 Content   (15-23)  ML/dL


 


ABG Base Excess   (-2.0-3.0)  mmol/L


 


ABG Hemoglobin   (11.7-17.4)  g/dL


 


ABG Carboxyhemoglobin   (0.5-1.5)  %


 


POC ABG HHb (Measured)   (0.0-5.0)  %


 


ABG Methemoglobin   (0.0-3.0)  %


 


ABG O2 Capacity   (16-24)  mL/dL


 


Chu Test   


 


A-a O2 Difference   mm/Hg


 


Hgb O2 Saturation   (95.0-98.0)  %


 


FiO2   %


 


Blood Gas Comments   


 


Sodium   (132-148)  mmol/l


 


Potassium   (3.6-5.0)  MMOL/L


 


Chloride   ()  mmol/L


 


Carbon Dioxide   (22-30)  mmol/L


 


Anion Gap   (10-20)  


 


BUN   (9-20)  mg/dl


 


Creatinine   (0.8-1.5)  mg/dL


 


Est GFR (African Amer)   


 


Est GFR (Non-Af Amer)   


 


POC Glucose (mg/dL)  105  ()  mg/dL


 


Random Glucose   ()  mg/dL


 


Calcium   (8.4-10.2)  mg/dL


 


Total Bilirubin   (0.2-1.3)  mg/dl


 


AST   (17-59)  U/L


 


ALT   (21-72)  U/L


 


Alkaline Phosphatase   ()  U/L


 


Total Protein   (6.3-8.2)  G/DL


 


Albumin   (3.5-5.0)  g/dL


 


Globulin   (2.2-3.9)  gm/dL


 


Albumin/Globulin Ratio   (1.0-2.1)  








 Laboratory Results - last 24 hr











  12/28/15 12/29/15 12/29/15





  21:20 04:00 04:52


 


WBC   13.4 H 


 


RBC   3.04 L 


 


Hgb   9.7 L 


 


Hct   30.7 L 


 


MCV   101.1 H 


 


MCH   31.8 H 


 


MCHC   31.4 L 


 


RDW   13.1 


 


Plt Count   491 H 


 


MPV   7.1 L 


 


Neut % (Auto)   74.7 


 


Lymph % (Auto)   11.0 L 


 


Mono % (Auto)   8.5 


 


Eos % (Auto)   5.2 H 


 


Baso % (Auto)   0.6 


 


Neut #   10.0 H 


 


Lymph #   1.5 


 


Mono #   1.1 H 


 


Eos #   0.7 


 


Baso #   0.1 


 


pCO2    46 H


 


pO2    121 H


 


HCO3    31.8 H


 


ABG pH    7.47 H


 


ABG Total CO2    34.9 H


 


ABG O2 Saturation    98.8 H


 


ABG O2 Content    14.1 L


 


ABG Base Excess    8.8 H


 


ABG Hemoglobin    10.2 L


 


ABG Carboxyhemoglobin    1.4


 


POC ABG HHb (Measured)    1.2


 


ABG Methemoglobin    0.9


 


ABG O2 Capacity    14.3 L


 


Chu Test    Yes


 


A-a O2 Difference    107.0


 


Hgb O2 Saturation    96.5


 


FiO2    40.0


 


Blood Gas Comments    rn paige


 


Sodium   142 


 


Potassium   4.1 


 


Chloride   101 


 


Carbon Dioxide   32 H 


 


Anion Gap   13 


 


BUN   20 


 


Creatinine   0.8 


 


Est GFR ( Amer)   > 60 


 


Est GFR (Non-Af Amer)   > 60 


 


POC Glucose (mg/dL)  105  


 


Random Glucose   117 H 


 


Calcium   9.4 


 


Total Bilirubin   0.4 


 


AST   37 


 


ALT   37 


 


Alkaline Phosphatase   159 H 


 


Total Protein   7.9 


 


Albumin   3.4 L 


 


Globulin   4.5 H 


 


Albumin/Globulin Ratio   0.8 L 














  12/29/15





  05:33


 


WBC 


 


RBC 


 


Hgb 


 


Hct 


 


MCV 


 


MCH 


 


MCHC 


 


RDW 


 


Plt Count 


 


MPV 


 


Neut % (Auto) 


 


Lymph % (Auto) 


 


Mono % (Auto) 


 


Eos % (Auto) 


 


Baso % (Auto) 


 


Neut # 


 


Lymph # 


 


Mono # 


 


Eos # 


 


Baso # 


 


pCO2 


 


pO2 


 


HCO3 


 


ABG pH 


 


ABG Total CO2 


 


ABG O2 Saturation 


 


ABG O2 Content 


 


ABG Base Excess 


 


ABG Hemoglobin 


 


ABG Carboxyhemoglobin 


 


POC ABG HHb (Measured) 


 


ABG Methemoglobin 


 


ABG O2 Capacity 


 


Chu Test 


 


A-a O2 Difference 


 


Hgb O2 Saturation 


 


FiO2 


 


Blood Gas Comments 


 


Sodium 


 


Potassium 


 


Chloride 


 


Carbon Dioxide 


 


Anion Gap 


 


BUN 


 


Creatinine 


 


Est GFR ( Amer) 


 


Est GFR (Non-Af Amer) 


 


POC Glucose (mg/dL)  118 H


 


Random Glucose 


 


Calcium 


 


Total Bilirubin 


 


AST 


 


ALT 


 


Alkaline Phosphatase 


 


Total Protein 


 


Albumin 


 


Globulin 


 


Albumin/Globulin Ratio 











Fingerstick Blood Sugar Results: 118





Assessment/Plan


(1) Acute respiratory failure


Current Visit: Yes   Status: Acute


Comment: cont vent, will need trach


CXR 12/28/15 


Cardiomegaly. 


Improving vascular congestion. 


Slightly improved patchy infiltrate left mid and lower lung field.  Minor mild 

increased density right lung base


small pleural effusion














(2) Aspiration pneumonia


Current Visit: Yes   Status: Acute


Comment: CXR improving


cont IV Cefapime


BD NEBS Q 6H PRN


psudomonas in trach asp


ID f/u appretiated


Daily Vent weaning trials


likely will need trach and LTAC placement 








(3) Cardiac arrest


Current Visit: Yes   Status: Acute


Comment: Anoxic brain injury


Cont. ASA,  Vasotec and Lopressor


no cardio interventions planned at present.


likely will need trach and LTAC placement 








(4) NSTEMI (non-ST elevated myocardial infarction)


Current Visit: Yes   Status: Acute


Comment: 


Cont. ASA,  Vasotec and Lopressor








(5) Diabetes mellitus with hyperglycemia


Current Visit: Yes   Status: Chronic


Comment: riss per protocol


much more controlled, will cont ot monitor








(6) History of seizure


Current Visit: Yes   Status: Acute


Comment: 


Cont. keppra 500 mg BID


Seizure Precatuion








(7) HTN (hypertension)


Current Visit: Yes   Status: Acute


Comment: 


Cont. ASA,  Vasotec and Lopressor








(8) Prophylactic measure


Current Visit: Yes   Status: Acute


Comment: 


SCDs and sage renew lovenox


Pepcid 20 mg BID

## 2015-12-30 LAB
ALBUMIN SERPL-MCNC: 3.6 G/DL (ref 3.5–5)
ALBUMIN/GLOB SERPL: 0.8 {RATIO} (ref 1–2.1)
ALT SERPL-CCNC: 32 U/L (ref 21–72)
ARTERIAL BLOOD GAS HEMOGLOBIN: 9.7 G/DL (ref 11.7–17.4)
ARTERIAL BLOOD GAS O2 SAT: 98.6 % (ref 95–98)
ARTERIAL BLOOD GAS PCO2: 43 MM/HG (ref 35–45)
ARTERIAL BLOOD GAS TCO2: 33.3 MMOL/L (ref 22–28)
ARTERIAL PATENCY WRIST A: YES
AST SERPL-CCNC: 36 U/L (ref 17–59)
BASOPHILS # BLD AUTO: 0.1 K/UL (ref 0–0.2)
BASOPHILS NFR BLD: 0.4 % (ref 0–2)
BUN SERPL-MCNC: 27 MG/DL (ref 9–20)
CALCIUM SERPL-MCNC: 9.5 MG/DL (ref 8.4–10.2)
EOSINOPHIL # BLD AUTO: 0.5 K/UL (ref 0–0.7)
EOSINOPHIL NFR BLD: 4.2 % (ref 0–4)
ERYTHROCYTE [DISTWIDTH] IN BLOOD BY AUTOMATED COUNT: 13.6 % (ref 11.5–14.5)
GFR NON-AFRICAN AMERICAN: 53
HCO3 BLDA-SCNC: 30.9 MMOL/L (ref 21–28)
HGB BLD-MCNC: 9.8 G/DL (ref 12–18)
INHALED O2 CONCENTRATION: 40 %
LYMPHOCYTES # BLD AUTO: 1.7 K/UL (ref 1–4.3)
LYMPHOCYTES NFR BLD AUTO: 14.7 % (ref 20–40)
MCH RBC QN AUTO: 32.5 PG (ref 27–31)
MCHC RBC AUTO-ENTMCNC: 32.1 G/DL (ref 33–37)
MCV RBC AUTO: 101 FL (ref 80–94)
MONOCYTES # BLD: 0.9 K/UL (ref 0–0.8)
MONOCYTES NFR BLD: 8 % (ref 0–10)
NEUTROPHILS # BLD: 8.5 K/UL (ref 1.8–7)
NEUTROPHILS NFR BLD AUTO: 72.7 % (ref 50–75)
NRBC BLD AUTO-RTO: 0 % (ref 0–0)
O2 CAP BLDA-SCNC: 13.6 ML/DL (ref 16–24)
O2 CT BLDA-SCNC: 13.4 ML/DL (ref 15–23)
PH BLDA: 7.48 [PH] (ref 7.35–7.45)
PLATELET # BLD: 461 K/UL (ref 130–400)
PMV BLD AUTO: 8 FL (ref 7.2–11.7)
PO2 BLDA: 103 MM/HG (ref 80–100)
RBC # BLD AUTO: 3.02 MIL/UL (ref 4.4–5.9)
WBC # BLD AUTO: 11.7 K/UL (ref 4.8–10.8)

## 2015-12-30 RX ADMIN — IPRATROPIUM BROMIDE AND ALBUTEROL SULFATE SCH ML: .5; 3 SOLUTION RESPIRATORY (INHALATION) at 07:56

## 2015-12-30 RX ADMIN — IPRATROPIUM BROMIDE AND ALBUTEROL SULFATE SCH ML: .5; 3 SOLUTION RESPIRATORY (INHALATION) at 00:59

## 2015-12-30 RX ADMIN — ENOXAPARIN SODIUM SCH MG: 40 INJECTION SUBCUTANEOUS at 08:34

## 2015-12-30 RX ADMIN — NITROGLYCERIN SCH IN: 20 OINTMENT TOPICAL at 21:55

## 2015-12-30 RX ADMIN — IPRATROPIUM BROMIDE AND ALBUTEROL SULFATE SCH ML: .5; 3 SOLUTION RESPIRATORY (INHALATION) at 19:00

## 2015-12-30 RX ADMIN — LEVETIRACETAM SCH: 100 SOLUTION ORAL at 16:47

## 2015-12-30 RX ADMIN — METOPROLOL TARTRATE SCH MG: 5 INJECTION INTRAVENOUS at 21:22

## 2015-12-30 RX ADMIN — Medication SCH ML: at 08:37

## 2015-12-30 RX ADMIN — NITROGLYCERIN SCH: 20 OINTMENT TOPICAL at 05:00

## 2015-12-30 RX ADMIN — LEVETIRACETAM SCH MG: 100 SOLUTION ORAL at 08:34

## 2015-12-30 RX ADMIN — IPRATROPIUM BROMIDE AND ALBUTEROL SULFATE SCH: .5; 3 SOLUTION RESPIRATORY (INHALATION) at 07:08

## 2015-12-30 RX ADMIN — IPRATROPIUM BROMIDE AND ALBUTEROL SULFATE SCH ML: .5; 3 SOLUTION RESPIRATORY (INHALATION) at 13:41

## 2015-12-30 RX ADMIN — NITROGLYCERIN SCH IN: 20 OINTMENT TOPICAL at 16:49

## 2015-12-30 NOTE — CP.PCM.PN
Subjective





- Subjective


Subjective: 


EXTUBATED


RX IN PROGRESS





Objective





- Vital Signs/Intake and Output


Vital Signs (last 24 hours): 


 Vital Signs - 24 hr











  12/29/15 12/29/15 12/29/15





  14:00 15:00 16:00


 


Temperature   98.8 F


 


Pulse Rate 108 H 108 H 104 H


 


Respiratory 12 19 13





Rate   


 


Blood Pressure 84/58 L 124/79 94/59 L


 


O2 Sat by Pulse 96 98 99





Oximetry   














  12/29/15 12/29/15 12/29/15





  18:00 20:00 21:35


 


Temperature   98.6 F


 


Pulse Rate 104 H 100 H 98 H


 


Respiratory 15 12 15





Rate   


 


Blood Pressure 101/63 105/72 110/71


 


O2 Sat by Pulse 98 100 99





Oximetry   














  12/30/15 12/30/15 12/30/15





  00:00 01:47 04:00


 


Temperature  99.5 F 


 


Pulse Rate 91 H 95 H 102 H


 


Respiratory 14 14 19





Rate   


 


Blood Pressure 125/81 108/63 105/72


 


O2 Sat by Pulse 100 100 96





Oximetry   














  12/30/15 12/30/15 12/30/15





  06:00 08:07 10:00


 


Temperature 99.9 F H 99.3 F 


 


Pulse Rate 100 H 100 H 84


 


Respiratory 15 20 25 H





Rate   


 


Blood Pressure 116/74 143/74 105/74


 


O2 Sat by Pulse 99 99 100





Oximetry   














  12/30/15





  12:23


 


Temperature 98.8 F


 


Pulse Rate 91 H


 


Respiratory 24





Rate 


 


Blood Pressure 114/72


 


O2 Sat by Pulse 95





Oximetry 











Intake and Output (last 12 hours): 


 Intake & Output











 12/29/15 12/30/15 12/30/15





 18:59 06:59 18:59


 


Intake Total 1280 1110 430


 


Output Total 350 557 100


 


Balance 930 553 330


 


Intake:   


 


  IV 10 50 


 


  Intake, Piggyback 100 100 100


 


  Oral   120


 


  Tube Feeding 720 660 60


 


  Free Water Flush 450 300 150


 


Output:   


 


  Gastric Amount  27 


 


    Stomach  27 


 


  Urine 350 530 100


 


    Urethral (Lua) 350 530 100


 


Other:   


 


  # Voids   


 


    Urethral (Lua)   0


 


  # Bowel Movements 1  














- Medications


Medications: 


 Current Medications





Albuterol/Ipratropium (Duoneb 3 Mg/0.5 Mg (3 Ml) Ud)  3 ml INH RQ6 [facility]


   Last Admin: 12/30/15 07:56 Dose:  3 ml


Enalapril Maleate (Vasotec)  10 mg PO DAILY [facility]


   Last Admin: 12/30/15 08:38 Dose:  10 mg


Enoxaparin Sodium (Lovenox)  40 mg SC DAILY [facility]


   Last Admin: 12/30/15 08:34 Dose:  40 mg


Famotidine (Pepcid)  20 mg GT BID [facility]


   Last Admin: 12/30/15 08:38 Dose:  20 mg


Cefepime HCl 2 gm/ Sodium (Chloride)  100 mls @ 100 mls/hr IVPB Q12 [facility]


   Last Admin: 12/30/15 08:36 Dose:  100 mls/hr


Insulin Human Regular (Humulin R)  0 units SC ACBD MAGDALENA


   PRN Reason: Protocol


   Last Admin: 12/30/15 07:14 Dose:  Not Given


Levetiracetam (Keppra)  500 mg PO BID [facility]


   Last Admin: 12/30/15 08:34 Dose:  500 mg


Lorazepam (Ativan)  2 mg IVP Q4 PRN


   PRN Reason: Agitation


   Last Admin: 12/30/15 08:30 Dose:  2 mg


Metoprolol Tartrate (Lopressor)  100 mg PO Q12 [facility]


   Last Admin: 12/30/15 08:34 Dose:  100 mg


Morphine Sulfate (Morphine)  2 mg IVP Q4 PRN


   PRN Reason: Agitation


   Last Admin: 12/30/15 05:45 Dose:  2 mg


Multivitamins/Vitamin C (Multi-Delyn Liquid)  5 ml PO DAILY [facility]


   Last Admin: 12/30/15 08:37 Dose:  5 ml


Nitroglycerin (Nitro-Bid 2% Oint)  2 in TOP Q6 [facility]


   Last Admin: 12/30/15 05:00 Dose:  Not Given











- Labs


Labs (last 24 hours): 


 Laboratory Results - last 24 hr











  12/29/15 12/30/15 12/30/15





  16:33 04:05 05:20


 


WBC   11.7 H 


 


RBC   3.02 L 


 


Hgb   9.8 L 


 


Hct   30.5 L 


 


MCV   101.0 H 


 


MCH   32.5 H 


 


MCHC   32.1 L 


 


RDW   13.6 


 


Plt Count   461 H 


 


MPV   8.0 


 


Neut % (Auto)   72.7 


 


Lymph % (Auto)   14.7 L 


 


Mono % (Auto)   8.0 


 


Eos % (Auto)   4.2 H 


 


Baso % (Auto)   0.4 


 


Neut #   8.5 H 


 


Lymph #   1.7 


 


Mono #   0.9 H 


 


Eos #   0.5 


 


Baso #   0.1 


 


pCO2    43


 


pO2    103 H


 


HCO3    30.9 H


 


ABG pH    7.48 H


 


ABG Total CO2    33.3 H


 


ABG O2 Saturation    98.6 H


 


ABG O2 Content    13.4 L


 


ABG Base Excess    7.7 H


 


ABG Hemoglobin    9.7 L


 


ABG Carboxyhemoglobin    1.2


 


POC ABG HHb (Measured)    1.4


 


ABG Methemoglobin    0.6


 


ABG O2 Capacity    13.6 L


 


Chu Test    Yes


 


A-a O2 Difference    128.0


 


Hgb O2 Saturation    96.8


 


FiO2    40.0


 


Blood Gas Comments    333


 


Crit Value Read Back    Y


 


Blood Gas Notified Time    530


 


Sodium   144 


 


Potassium   4.3 


 


Chloride   103 


 


Carbon Dioxide   31 H 


 


Anion Gap   14 


 


BUN   27 H 


 


Creatinine   1.4 


 


Est GFR ( Amer)   > 60 


 


Est GFR (Non-Af Amer)   53 


 


POC Glucose (mg/dL)  118 H  


 


Random Glucose   124 H 


 


Calcium   9.5 


 


Total Bilirubin   0.5 


 


AST   36 


 


ALT   32 


 


Alkaline Phosphatase   180 H 


 


Total Protein   8.2 


 


Albumin   3.6 


 


Globulin   4.7 H 


 


Albumin/Globulin Ratio   0.8 L 














  12/30/15 12/30/15





  05:50 11:30


 


WBC  


 


RBC  


 


Hgb  


 


Hct  


 


MCV  


 


MCH  


 


MCHC  


 


RDW  


 


Plt Count  


 


MPV  


 


Neut % (Auto)  


 


Lymph % (Auto)  


 


Mono % (Auto)  


 


Eos % (Auto)  


 


Baso % (Auto)  


 


Neut #  


 


Lymph #  


 


Mono #  


 


Eos #  


 


Baso #  


 


pCO2  


 


pO2  


 


HCO3  


 


ABG pH  


 


ABG Total CO2  


 


ABG O2 Saturation  


 


ABG O2 Content  


 


ABG Base Excess  


 


ABG Hemoglobin  


 


ABG Carboxyhemoglobin  


 


POC ABG HHb (Measured)  


 


ABG Methemoglobin  


 


ABG O2 Capacity  


 


Chu Test  


 


A-a O2 Difference  


 


Hgb O2 Saturation  


 


FiO2  


 


Blood Gas Comments  


 


Crit Value Read Back  


 


Blood Gas Notified Time  


 


Sodium  


 


Potassium  


 


Chloride  


 


Carbon Dioxide  


 


Anion Gap  


 


BUN  


 


Creatinine  


 


Est GFR ( Amer)  


 


Est GFR (Non-Af Amer)  


 


POC Glucose (mg/dL)  111 H  91


 


Random Glucose  


 


Calcium  


 


Total Bilirubin  


 


AST  


 


ALT  


 


Alkaline Phosphatase  


 


Total Protein  


 


Albumin  


 


Globulin  


 


Albumin/Globulin Ratio  














- Constitutional


Appears: Non-toxic, Cachectic, Chronically Ill





- Head Exam


Head Exam: absent: NORMOCEPHALIC





- Eye Exam


Eye Exam: Scleral icterus





- Neck Exam


Neck exam: absent: Lymphadenopathy





- Respiratory Exam


Respiratory Exam: Decreased Breath Sounds, Rhonchi





- GI/Abdominal Exam


GI & Abdominal Exam: Normal Bowel Sounds





- Rectal Exam


Rectal Exam: Deferred





Assessment/Plan


(1) Agitation


Current Visit: Yes   Status: Acute


Comment: morphine prn


ativan prn








(2) Aspiration pneumonia


Current Visit: Yes   Status: Acute


Comment: CXR improving


cont IV Cefapime


BD NEBS Q 6H PRN


psudomonas in trach asp


ID f/u appretiated


Daily Vent weaning trials


likely will need trach and LTAC placement 








(3) Cardiac arrest


Current Visit: Yes   Status: Acute


Comment: Anoxic brain injury


Cont. ASA,  Vasotec and Lopressor


no cardio interventions planned at present.


likely will need trach and LTAC placement -pending guardianship








(4) Diabetes mellitus with hyperglycemia


Current Visit: Yes   Status: Chronic


Comment: riss per protocol


much more controlled, will cont ot monitor








(5) History of seizure


Current Visit: Yes   Status: Acute


Comment: 


Cont. keppra 500 mg BID


Seizure Precatuion








(6) NSTEMI (non-ST elevated myocardial infarction)


Current Visit: Yes   Status: Acute


Comment: 


Cont. ASA,  Vasotec and Lopressor

## 2015-12-30 NOTE — CP.PCM.PN
Subjective





- Subjective


Subjective: 


Patient is extubated but not responding to commands





Review of Systems





- Review of Systems


Systems not reviewed;Unavailable: Altered Mental Status





Objective





- Vital Signs/Intake and Output


Vital Signs (last 24 hours): 


 Vital Signs - 24 hr











  12/29/15 12/29/15 12/29/15





  18:00 20:00 21:35


 


Temperature   98.6 F


 


Pulse Rate 104 H 100 H 98 H


 


Respiratory 15 12 15





Rate   


 


Blood Pressure 101/63 105/72 110/71


 


O2 Sat by Pulse 98 100 99





Oximetry   














  12/30/15 12/30/15 12/30/15





  00:00 01:47 04:00


 


Temperature  99.5 F 


 


Pulse Rate 91 H 95 H 102 H


 


Respiratory 14 14 19





Rate   


 


Blood Pressure 125/81 108/63 105/72


 


O2 Sat by Pulse 100 100 96





Oximetry   














  12/30/15 12/30/15 12/30/15





  06:00 08:07 10:00


 


Temperature 99.9 F H 99.3 F 


 


Pulse Rate 100 H 100 H 84


 


Respiratory 15 20 25 H





Rate   


 


Blood Pressure 116/74 143/74 105/74


 


O2 Sat by Pulse 99 99 100





Oximetry   














  12/30/15 12/30/15





  12:23 14:00


 


Temperature 98.8 F 


 


Pulse Rate 91 H 95 H


 


Respiratory 24 12





Rate  


 


Blood Pressure 114/72 127/82


 


O2 Sat by Pulse 95 94 L





Oximetry  











Intake and Output (last 12 hours): 


 Intake & Output











 12/29/15 12/30/15 12/30/15





 18:59 06:59 18:59


 


Intake Total 1280 1110 430


 


Output Total 350 557 100


 


Balance 930 553 330


 


Intake:   


 


  IV 10 50 


 


  Intake, Piggyback 100 100 100


 


  Oral   120


 


  Tube Feeding 720 660 60


 


  Free Water Flush 450 300 150


 


Output:   


 


  Gastric Amount  27 


 


    Stomach  27 


 


  Urine 350 530 100


 


    Urethral (Lua) 350 530 100


 


Other:   


 


  # Voids   


 


    Urethral (Lua)   1


 


  # Bowel Movements 1  














- Medications


Medications: 


 Current Medications





Albuterol/Ipratropium (Duoneb 3 Mg/0.5 Mg (3 Ml) Ud)  3 ml INH RQ6 [facility]


   Last Admin: 12/30/15 13:41 Dose:  3 ml


Enalapril Maleate (Vasotec)  10 mg PO DAILY [facility]


   Last Admin: 12/30/15 08:38 Dose:  10 mg


Enoxaparin Sodium (Lovenox)  40 mg SC DAILY [facility]


   Last Admin: 12/30/15 08:34 Dose:  40 mg


Famotidine (Pepcid)  20 mg GT BID [facility]


   Last Admin: 12/30/15 08:38 Dose:  20 mg


Cefepime HCl 2 gm/ Sodium (Chloride)  100 mls @ 100 mls/hr IVPB Q12 [facility]


   Last Admin: 12/30/15 08:36 Dose:  100 mls/hr


Insulin Human Regular (Humulin R)  0 units SC ACBD MAGDALENA


   PRN Reason: Protocol


   Last Admin: 12/30/15 07:14 Dose:  Not Given


Levetiracetam (Keppra)  500 mg PO BID [facility]


   Last Admin: 12/30/15 08:34 Dose:  500 mg


Lorazepam (Ativan)  2 mg IVP Q4 PRN


   PRN Reason: Agitation


   Last Admin: 12/30/15 08:30 Dose:  2 mg


Metoprolol Tartrate (Lopressor)  100 mg PO Q12 [facility]


   Last Admin: 12/30/15 08:34 Dose:  100 mg


Morphine Sulfate (Morphine)  2 mg IVP Q4 PRN


   PRN Reason: Agitation


   Last Admin: 12/30/15 05:45 Dose:  2 mg


Multivitamins/Vitamin C (Multi-Delyn Liquid)  5 ml PO DAILY [facility]


   Last Admin: 12/30/15 08:37 Dose:  5 ml


Nitroglycerin (Nitro-Bid 2% Oint)  2 in TOP Q6 [facility]


   Last Admin: 12/30/15 05:00 Dose:  Not Given











- Labs


Labs (last 24 hours): 


 Laboratory Results - last 24 hr











  12/29/15 12/30/15 12/30/15





  16:33 04:05 05:20


 


WBC   11.7 H 


 


RBC   3.02 L 


 


Hgb   9.8 L 


 


Hct   30.5 L 


 


MCV   101.0 H 


 


MCH   32.5 H 


 


MCHC   32.1 L 


 


RDW   13.6 


 


Plt Count   461 H 


 


MPV   8.0 


 


Neut % (Auto)   72.7 


 


Lymph % (Auto)   14.7 L 


 


Mono % (Auto)   8.0 


 


Eos % (Auto)   4.2 H 


 


Baso % (Auto)   0.4 


 


Neut #   8.5 H 


 


Lymph #   1.7 


 


Mono #   0.9 H 


 


Eos #   0.5 


 


Baso #   0.1 


 


pCO2    43


 


pO2    103 H


 


HCO3    30.9 H


 


ABG pH    7.48 H


 


ABG Total CO2    33.3 H


 


ABG O2 Saturation    98.6 H


 


ABG O2 Content    13.4 L


 


ABG Base Excess    7.7 H


 


ABG Hemoglobin    9.7 L


 


ABG Carboxyhemoglobin    1.2


 


POC ABG HHb (Measured)    1.4


 


ABG Methemoglobin    0.6


 


ABG O2 Capacity    13.6 L


 


Chu Test    Yes


 


A-a O2 Difference    128.0


 


Hgb O2 Saturation    96.8


 


FiO2    40.0


 


Blood Gas Comments    333


 


Crit Value Read Back    Y


 


Blood Gas Notified Time    530


 


Sodium   144 


 


Potassium   4.3 


 


Chloride   103 


 


Carbon Dioxide   31 H 


 


Anion Gap   14 


 


BUN   27 H 


 


Creatinine   1.4 


 


Est GFR ( Amer)   > 60 


 


Est GFR (Non-Af Amer)   53 


 


POC Glucose (mg/dL)  118 H  


 


Random Glucose   124 H 


 


Calcium   9.5 


 


Total Bilirubin   0.5 


 


AST   36 


 


ALT   32 


 


Alkaline Phosphatase   180 H 


 


Total Protein   8.2 


 


Albumin   3.6 


 


Globulin   4.7 H 


 


Albumin/Globulin Ratio   0.8 L 














  12/30/15 12/30/15





  05:50 11:30


 


WBC  


 


RBC  


 


Hgb  


 


Hct  


 


MCV  


 


MCH  


 


MCHC  


 


RDW  


 


Plt Count  


 


MPV  


 


Neut % (Auto)  


 


Lymph % (Auto)  


 


Mono % (Auto)  


 


Eos % (Auto)  


 


Baso % (Auto)  


 


Neut #  


 


Lymph #  


 


Mono #  


 


Eos #  


 


Baso #  


 


pCO2  


 


pO2  


 


HCO3  


 


ABG pH  


 


ABG Total CO2  


 


ABG O2 Saturation  


 


ABG O2 Content  


 


ABG Base Excess  


 


ABG Hemoglobin  


 


ABG Carboxyhemoglobin  


 


POC ABG HHb (Measured)  


 


ABG Methemoglobin  


 


ABG O2 Capacity  


 


Chu Test  


 


A-a O2 Difference  


 


Hgb O2 Saturation  


 


FiO2  


 


Blood Gas Comments  


 


Crit Value Read Back  


 


Blood Gas Notified Time  


 


Sodium  


 


Potassium  


 


Chloride  


 


Carbon Dioxide  


 


Anion Gap  


 


BUN  


 


Creatinine  


 


Est GFR ( Amer)  


 


Est GFR (Non-Af Amer)  


 


POC Glucose (mg/dL)  111 H  91


 


Random Glucose  


 


Calcium  


 


Total Bilirubin  


 


AST  


 


ALT  


 


Alkaline Phosphatase  


 


Total Protein  


 


Albumin  


 


Globulin  


 


Albumin/Globulin Ratio  














- Constitutional


Appears: Chronically Ill





- Head Exam


Head Exam: NORMAL INSPECTION





- Eye Exam


Eye Exam: Normal appearance





- ENT Exam


ENT Exam: Mucous Membranes Moist





- Neck Exam


Neck exam: Full Rom





- Respiratory Exam


Respiratory Exam: Decreased Breath Sounds





- Cardiovascular Exam


Cardiovascular Exam: REGULAR RHYTHM





- GI/Abdominal Exam


GI & Abdominal Exam: Normal Bowel Sounds





- Rectal Exam


Rectal Exam: Deferred





- Extremities Exam


Extremities exam: full ROM





- Back Exam


Back exam: NORMAL INSPECTION





- Psychiatric Exam


Psychiatric exam: Normal Affect





- Skin


Skin Exam: Normal Color





Assessment/Plan


(1) Cardiac arrest


Assessment and plan: 


 





Patient has no current mental status.  I advise conservative medical therapy. 

No plans for cardiac cath.








Current Visit: Yes   Status: Acute





(2) NSTEMI (non-ST elevated myocardial infarction)


Assessment and plan: 


Patient may have underlying CAD as a cause of his acute event and cardiac 

arrest.  





Conservative therapy.


Current Visit: Yes   Status: Acute


Comment: 


Cont. ASA,  Vasotec and Lopressor








(3) Ventricular tachycardia


Assessment and plan: 


if recurrence recommend amiodarone drip.


Current Visit: Yes   Status: Acute





(4) HTN (hypertension)


Assessment and plan: 


add metoprolol


Current Visit: Yes   Status: Acute


Comment: 


Cont. ASA,  Vasotec and Lopressor

## 2015-12-30 NOTE — CP.PCM.PN
Subjective





- Subjective


Subjective: 


pt more awake and alert, coughing on vent.  prn sedation keeping pt calm for 

periods of time. vs stable at present.  periods of bradycardia overnight.  pts 

tlc removed overnight and pt now has peripheral access.  pending guardianship 

for long term procedures


large amt of secretions noted


pt communication w/ facial expressions, hand signals





Review of Systems





- Review of Systems


Systems not reviewed;Unavailable: Intubated





- Constitutional


Constitutional: UN





- EENT


Eyes: UNREMARKABLE


Ears: UNREMARKABLE


Nose/Mouth/Throat: UNREMARKABLE





- Cardiovascular


Cardiovascular: UNREMARKABLE





- Respiratory


Respiratory: As Per HPI, Cough, Chest Congestion, Excessive Mucous Production





- Gastrointestinal


Gastrointestinal: UNREMARKABLE





- Genitourinary


Genitourinary: UNREMARKABLE





- Reproductive: Male


Reproductive:Male: UNREMARKABLE





- Musculoskeletal


Musculoskeletal: UNREMARKABLE





- Integumentary


Integumentary: UNREMARKABLE





- Neurological


Neurological: UNREMARKABLE





- Psychiatric


Psychiatric: UNREMARKABLE





- Endocrine


Endocrine: UNREMARKABLE





- Hematologic/Lymphatic


Hematologic: UNREMARKABLE





Objective





- Vital Signs/Intake and Output


Vital Signs (last 24 hours): 


 Vital Signs - 24 hr











  12/29/15 12/29/15 12/29/15





  10:00 12:00 14:00


 


Temperature  99.7 F H 


 


Pulse Rate 105 H 111 H 108 H


 


Respiratory 15 24 12





Rate   


 


Blood Pressure 99/63 L 130/80 84/58 L


 


O2 Sat by Pulse 100 100 96





Oximetry   














  12/29/15 12/29/15 12/29/15





  15:00 16:00 18:00


 


Temperature  98.8 F 


 


Pulse Rate 108 H 104 H 104 H


 


Respiratory 19 13 15





Rate   


 


Blood Pressure 124/79 94/59 L 101/63


 


O2 Sat by Pulse 98 99 98





Oximetry   














  12/29/15 12/29/15 12/30/15





  20:00 21:35 00:00


 


Temperature  98.6 F 


 


Pulse Rate 100 H 98 H 91 H


 


Respiratory 12 15 14





Rate   


 


Blood Pressure 105/72 110/71 125/81


 


O2 Sat by Pulse 100 99 100





Oximetry   














  12/30/15 12/30/15 12/30/15





  01:47 04:00 06:00


 


Temperature 99.5 F  99.9 F H


 


Pulse Rate 95 H 102 H 100 H


 


Respiratory 14 19 15





Rate   


 


Blood Pressure 108/63 105/72 116/74


 


O2 Sat by Pulse 100 96 99





Oximetry   











Intake and Output (last 12 hours): 


 Intake & Output











 12/29/15 12/30/15 12/30/15





 18:59 06:59 18:59


 


Intake Total 1280 1110 


 


Output Total 350 557 


 


Balance 930 553 


 


Intake:   


 


  IV 10 50 


 


  Intake, Piggyback 100 100 


 


  Tube Feeding 720 660 


 


  Free Water Flush 450 300 


 


Output:   


 


  Gastric Amount  27 


 


    Stomach  27 


 


  Urine 350 530 


 


    Urethral (Lua) 350 530 


 


Other:   


 


  # Bowel Movements 1  














- Medications


Medications: 


 Current Medications





Albuterol/Ipratropium (Duoneb 3 Mg/0.5 Mg (3 Ml) Ud)  3 ml INH RQ6 [facility]


   Last Admin: 12/30/15 07:56 Dose:  3 ml


Enalapril Maleate (Vasotec)  10 mg PO DAILY [facility]


   Last Admin: 12/29/15 08:36 Dose:  10 mg


Enoxaparin Sodium (Lovenox)  40 mg SC DAILY [facility]


   Last Admin: 12/29/15 14:11 Dose:  40 mg


Famotidine (Pepcid)  20 mg GT BID [facility]


   Last Admin: 12/29/15 16:33 Dose:  20 mg


Cefepime HCl 2 gm/ Sodium (Chloride)  100 mls @ 100 mls/hr IVPB Q12 [facility]


   Last Admin: 12/29/15 21:15 Dose:  100 mls/hr


Insulin Human Regular (Humulin R)  0 units SC ACBD MAGDALENA


   PRN Reason: Protocol


   Last Admin: 12/30/15 07:14 Dose:  Not Given


Levetiracetam (Keppra)  500 mg PO BID [facility]


   Last Admin: 12/29/15 16:33 Dose:  500 mg


Lorazepam (Ativan)  2 mg IVP Q4 PRN


   PRN Reason: Agitation


   Last Admin: 12/30/15 04:31 Dose:  2 mg


Metoprolol Tartrate (Lopressor)  100 mg PO Q12 [facility]


   Last Admin: 12/29/15 21:16 Dose:  100 mg


Morphine Sulfate (Morphine)  2 mg IVP Q4 PRN


   PRN Reason: Agitation


   Last Admin: 12/30/15 05:45 Dose:  2 mg


Multivitamins/Vitamin C (Multi-Delyn Liquid)  5 ml PO DAILY [facility]


   Last Admin: 12/29/15 08:37 Dose:  5 ml


Nitroglycerin (Nitro-Bid 2% Oint)  2 in TOP Q6 [facility]


   Last Admin: 12/30/15 05:00 Dose:  Not Given











- Labs


Labs (last 24 hours): 


 Laboratory Results - last 24 hr











  12/29/15 12/30/15 12/30/15





  16:33 04:05 05:20


 


WBC   11.7 H 


 


RBC   3.02 L 


 


Hgb   9.8 L 


 


Hct   30.5 L 


 


MCV   101.0 H 


 


MCH   32.5 H 


 


MCHC   32.1 L 


 


RDW   13.6 


 


Plt Count   461 H 


 


MPV   8.0 


 


Neut % (Auto)   72.7 


 


Lymph % (Auto)   14.7 L 


 


Mono % (Auto)   8.0 


 


Eos % (Auto)   4.2 H 


 


Baso % (Auto)   0.4 


 


Neut #   8.5 H 


 


Lymph #   1.7 


 


Mono #   0.9 H 


 


Eos #   0.5 


 


Baso #   0.1 


 


pCO2    43


 


pO2    103 H


 


HCO3    30.9 H


 


ABG pH    7.48 H


 


ABG Total CO2    33.3 H


 


ABG O2 Saturation    98.6 H


 


ABG O2 Content    13.4 L


 


ABG Base Excess    7.7 H


 


ABG Hemoglobin    9.7 L


 


ABG Carboxyhemoglobin    1.2


 


POC ABG HHb (Measured)    1.4


 


ABG Methemoglobin    0.6


 


ABG O2 Capacity    13.6 L


 


Chu Test    Yes


 


A-a O2 Difference    128.0


 


Hgb O2 Saturation    96.8


 


FiO2    40.0


 


Blood Gas Comments    333


 


Crit Value Read Back    Y


 


Blood Gas Notified Time    530


 


Sodium   144 


 


Potassium   4.3 


 


Chloride   103 


 


Carbon Dioxide   31 H 


 


Anion Gap   14 


 


BUN   27 H 


 


Creatinine   1.4 


 


Est GFR ( Amer)   > 60 


 


Est GFR (Non-Af Amer)   53 


 


POC Glucose (mg/dL)  118 H  


 


Random Glucose   124 H 


 


Calcium   9.5 


 


Total Bilirubin   0.5 


 


AST   36 


 


ALT   32 


 


Alkaline Phosphatase   180 H 


 


Total Protein   8.2 


 


Albumin   3.6 


 


Globulin   4.7 H 


 


Albumin/Globulin Ratio   0.8 L 














  12/30/15





  05:50


 


WBC 


 


RBC 


 


Hgb 


 


Hct 


 


MCV 


 


MCH 


 


MCHC 


 


RDW 


 


Plt Count 


 


MPV 


 


Neut % (Auto) 


 


Lymph % (Auto) 


 


Mono % (Auto) 


 


Eos % (Auto) 


 


Baso % (Auto) 


 


Neut # 


 


Lymph # 


 


Mono # 


 


Eos # 


 


Baso # 


 


pCO2 


 


pO2 


 


HCO3 


 


ABG pH 


 


ABG Total CO2 


 


ABG O2 Saturation 


 


ABG O2 Content 


 


ABG Base Excess 


 


ABG Hemoglobin 


 


ABG Carboxyhemoglobin 


 


POC ABG HHb (Measured) 


 


ABG Methemoglobin 


 


ABG O2 Capacity 


 


Chu Test 


 


A-a O2 Difference 


 


Hgb O2 Saturation 


 


FiO2 


 


Blood Gas Comments 


 


Crit Value Read Back 


 


Blood Gas Notified Time 


 


Sodium 


 


Potassium 


 


Chloride 


 


Carbon Dioxide 


 


Anion Gap 


 


BUN 


 


Creatinine 


 


Est GFR ( Amer) 


 


Est GFR (Non-Af Amer) 


 


POC Glucose (mg/dL)  111 H


 


Random Glucose 


 


Calcium 


 


Total Bilirubin 


 


AST 


 


ALT 


 


Alkaline Phosphatase 


 


Total Protein 


 


Albumin 


 


Globulin 


 


Albumin/Globulin Ratio 














- Constitutional


Appears: Non-toxic, No Acute Distress, Chronically Ill





- Head Exam


Head Exam: ATRAUMATIC, NORMAL INSPECTION, NORMOCEPHALIC





- Eye Exam


Eye Exam: EOMI





- Respiratory Exam


Respiratory Exam: Clear to PA & Lateral


Additional comments: 


vented





- Cardiovascular Exam


Cardiovascular Exam: REGULAR RHYTHM, RRR





- GI/Abdominal Exam


GI & Abdominal Exam: Normal Bowel Sounds, Soft, Unremarkable





- Extremities Exam


Extremities exam: full ROM, normal capillary refill, normal inspection, pedal 

pulses present





- Back Exam


Back exam: FULL ROM





- Neurological Exam


Neurological exam: Alert, CN II-XII Intact, Reflexes Normal





- Psychiatric Exam


Psychiatric exam: Normal Affect, Normal Mood





- Skin


Skin Exam: Dry, Intact, Normal Color, Warm





Assessment/Plan


(1) Cardiac arrest


Current Visit: Yes   Status: Acute


Comment: Anoxic brain injury


Cont. ASA,  Vasotec and Lopressor


no cardio interventions planned at present.


likely will need trach and LTAC placement -pending guardianship








(2) DVT prophylaxis


Current Visit: Yes   Status: Acute


Comment: SCDs and sage renew lovenox








(3) Scrotal edema


Current Visit: Yes   Status: Acute


Comment: scrotal us-noted


urology consult-dr cortes to see pt when stable








(4) History of seizure


Current Visit: Yes   Status: Acute


Comment: 


Cont. keppra 500 mg BID


Seizure Precatuion








(5) Diabetes mellitus with hyperglycemia


Current Visit: Yes   Status: Chronic


Comment: riss per protocol


much more controlled, will cont ot monitor








(6) Agitation


Current Visit: Yes   Status: Acute


Comment: morphine prn


ativan prn








(7) Aspiration pneumonia


Current Visit: Yes   Status: Acute


Comment: CXR improving


cont IV Cefapime


BD NEBS Q 6H PRN


psudomonas in trach asp


ID f/u appretiated


Daily Vent weaning trials


likely will need trach and LTAC placement 








(8) Acute respiratory failure


Current Visit: Yes   Status: Acute


Comment: cont vent, will need trach


CXR 12/28/15 


Cardiomegaly. 


Improving vascular congestion. 


Slightly improved patchy infiltrate left mid and lower lung field.  Minor mild 

increased density right lung base


small pleural effusion

















- Assessment and Plan (Free Text)


Assessment: 


pt is pending guardianship for permenant procedures


pending guardianship for placement

## 2015-12-30 NOTE — CP.CCUPN
CCU Subjective





- Physician Review


Subjective (Free Text): 


***INTENSIVIST PROGRESS NOTE***


Patient examined, interim events reviewed:





Intermittently getting sedated with Ativan 2 mg IVP for restlessness, no 

asynchrony noted with MV. Placed on CPAP PS when he awoke from the effects of 

last administered Ativan at approx 800AM and tolerated CPAP well. for over an 

hour. Awake and following commands, adequate cough reflex noted, decision made 

to perform an extubation trial. ETT removed and OGT removed without 

complications. SPO2 100% on 40% CAM. He remains awake and alert, rhonchourous 

spontaneous intermittent cough noted. Otherwise,  Afebrile, temp max 99.9F so 

far, HR 85 sinus, /74. 24H I/Os = 2390/907ml.








EXAM-


HEENT: no icterus, no nystagmus


NECK: no visible JVD, supple, carotids equal upstroke bilat/no bruits


CHEST: decreased BS bases, no wheezes audible


HEART:  regular, distant, S1S2, no murmur audible, no rubs.


ABD:  soft, no increased distention, no focal tenderness,  no HSM. BS hypoactive

, 


EXT:    +UE bilat edema decreasing, no peripheral/ digital cyanosis, no 

palpable cords, distal pulses intact and symmetrical, SCDs on bilat. 


NEURO:  no focal neuro motor deficits. 


SKIN:   no rashes





LABS


WBC= 11.7


HGB= 9.8


PLTs = 461K





Na= 144


K=  4.3


HCO3=31


BUN/Cr=  27/1.4


BS= 111


7.48/43/103


CXR:  ETT position OK above ru, improved interstitial changes noted, no new 

consolidation. (My interp)





Assessment: 


1.   Cardiac Arrest with resuscitation from VT. 


2.   Post-Resuscitation Therapeutic Hypothermia-completed


3.   Anoxic Encephalopathy


4.     Aspiration Pneumonia


5.    H/o Seizure Disorder with recurrence








PLAN:


1.   Today would be Day # 23 on MV support, and he has just now been extubated. 

Continue to monitor resp status and provide pulm toilet with frequent oral 

suctioning as tolerated. 


2.   Ongoing Cefepime coverage against Kleb and Psuedomonas isolates in sputum. 

CXR picture has dramatically improved. 


3.   Lopressor: increased to 100 mg Q12H. Recurrent tachyarrythmias may be 

triggered by agitation. Amiodarone drip stopped for now, will try to avoid 

using both Amio maintenance and BBs together.


4.   IVF hydration, dysphagia eval. 











CCU Objective





- Vital Signs / Intake & Output


Vital Signs (Last 4 hours): 


Vital Signs











  Temp Pulse Resp BP Pulse Ox


 


 12/30/15 08:07  99.3 F  100 H  20  143/74  99











Intake and Output (Last 8hrs): 


 Intake & Output











 12/29/15 12/30/15 12/30/15





 22:59 06:59 14:59


 


Intake Total 580 920 210


 


Output Total 575 332 100


 


Balance 5 588 110


 


Intake:   


 


  IV 10 40 


 


  Intake, Piggyback  100 


 


  Tube Feeding 420 480 60


 


  Free Water Flush 150 300 150


 


Output:   


 


  Gastric Amount 20 7 


 


    Stomach 20 7 


 


  Urine 555 325 100


 


    Urethral (Lua) 555 325 100














- Physical Exam


Head: Positive for: Atraumatic, Normocephalic


Pupils: Positive for: PERRL


Extroacular Muscles: Positive for: EOMI


Conjunctiva: Positive for: Normal.  Negative for: Injected, Icteric


Neck: Positive for: Normal Range of Motion, Trachea Midline.  Negative for: 

Meningeal Signs, MIDLINE TENDERNESS, Paraspinal Tenderness, JVD, Lymphadenopathy

, Bruit, Other


Respiratory/Chest: Positive for: Clear to Auscultation, Good Air Exchange.  

Negative for: Respiratory Distress, Accessory Muscle Use, Wheezes, Decreased 

Breath Sounds, Rales, Retracting, Rhonchi, Tachypneic, Tender to Palpation, 

Other


Cardiovascular: Positive for: Normal S1, S2, Irregular Rhythm, Peripheal Pulses 

Present.  Negative for: Murmurs


Abdomen: Positive for: Normal Bowel Sounds.  Negative for: Tenderness, 

Distention, Peritoneal Signs


Upper Extremity: Positive for: Normal Inspection.  Negative for: Cyanosis, Edema


Lower Extremity: Positive for: Normal Inspection.  Negative for: Edema, CALF 

TENDERNESS





- Medications


Active Medications: 


Active Medications











Generic Name Dose Route Start Last Admin





  Trade Name Freq  PRN Reason Stop Dose Admin


 


Albuterol/Ipratropium  3 ml 12/18/15 08:00 12/30/15 07:56





  Duoneb 3 Mg/0.5 Mg (3 Ml) Ud  INH   3 ml





  RQ6 MAGDALENA   Administration


 


Enalapril Maleate  10 mg 12/10/15 11:15 12/30/15 08:38





  Vasotec  PO   10 mg





  DAILY MAGDALENA   Administration


 


Enoxaparin Sodium  40 mg 12/29/15 09:00 12/30/15 08:34





  Lovenox  SC   40 mg





  DAILY MAGDALENA   Administration


 


Famotidine  20 mg 12/10/15 17:00 12/30/15 08:38





  Pepcid  GT   20 mg





  BID MAGDALENA   Administration


 


Cefepime HCl 2 gm/ Sodium  100 mls @ 100 mls/hr 12/21/15 21:00 12/30/15 08:36





  Chloride  IVPB   100 mls/hr





  Q12 MAGDALENA   Administration


 


Insulin Human Regular  0 units 12/29/15 16:30 12/30/15 07:14





  Humulin R  SC   Not Given





  ACBD [facility]   





  Protocol   


 


Levetiracetam  500 mg 12/18/15 09:00 12/30/15 08:34





  Keppra  PO   500 mg





  BID MAGDALENA   Administration


 


Lorazepam  2 mg 12/27/15 21:49 12/30/15 08:30





  Ativan  IVP   2 mg





  Q4 PRN   Administration





  Agitation   


 


Metoprolol Tartrate  100 mg 12/24/15 09:00 12/30/15 08:34





  Lopressor  PO   100 mg





  Q12 MAGDALENA   Administration


 


Morphine Sulfate  2 mg 12/27/15 21:49 12/30/15 05:45





  Morphine  IVP   2 mg





  Q4 PRN   Administration





  Agitation   


 


Multivitamins/Vitamin C  5 ml 12/10/15 09:00 12/30/15 08:37





  Multi-Delyn Liquid  PO   5 ml





  DAILY MAGDALENA   Administration


 


Nitroglycerin  2 in 12/10/15 16:00 12/30/15 05:00





  Nitro-Bid 2% Oint  TOP   Not Given





  Q6 MAGDALENA   














- Patient Studies


Lab Studies: 


 Lab Studies











  12/30/15 12/30/15 12/30/15 Range/Units





  05:50 05:20 04:05 


 


WBC    11.7 H  (4.8-10.8)  K/uL


 


RBC    3.02 L  (4.40-5.90)  Mil/uL


 


Hgb    9.8 L  (12.0-18.0)  g/dL


 


Hct    30.5 L  (35.0-51.0)  %


 


MCV    101.0 H  (80.0-94.0)  fl


 


MCH    32.5 H  (27.0-31.0)  pg


 


MCHC    32.1 L  (33.0-37.0)  g/dL


 


RDW    13.6  (11.5-14.5)  %


 


Plt Count    461 H  (130-400)  K/uL


 


MPV    8.0  (7.2-11.7)  fl


 


Neut % (Auto)    72.7  (50.0-75.0)  %


 


Lymph % (Auto)    14.7 L  (20.0-40.0)  %


 


Mono % (Auto)    8.0  (0.0-10.0)  %


 


Eos % (Auto)    4.2 H  (0.0-4.0)  %


 


Baso % (Auto)    0.4  (0.0-2.0)  %


 


Neut #    8.5 H  (1.8-7.0)  K/uL


 


Lymph #    1.7  (1.0-4.3)  K/uL


 


Mono #    0.9 H  (0.0-0.8)  K/uL


 


Eos #    0.5  (0.0-0.7)  K/uL


 


Baso #    0.1  (0.0-0.2)  K/uL


 


pCO2   43   (35-45)  mm/Hg


 


pO2   103 H   ()  mm/Hg


 


HCO3   30.9 H   (21-28)  mmol/L


 


ABG pH   7.48 H   (7.35-7.45)  


 


ABG Total CO2   33.3 H   (22-28)  mmol/L


 


ABG O2 Saturation   98.6 H   (95-98)  %


 


ABG O2 Content   13.4 L   (15-23)  ML/dL


 


ABG Base Excess   7.7 H   (-2.0-3.0)  mmol/L


 


ABG Hemoglobin   9.7 L   (11.7-17.4)  g/dL


 


ABG Carboxyhemoglobin   1.2   (0.5-1.5)  %


 


POC ABG HHb (Measured)   1.4   (0.0-5.0)  %


 


ABG Methemoglobin   0.6   (0.0-3.0)  %


 


ABG O2 Capacity   13.6 L   (16-24)  mL/dL


 


Chu Test   Yes   


 


A-a O2 Difference   128.0   mm/Hg


 


Hgb O2 Saturation   96.8   (95.0-98.0)  %


 


FiO2   40.0   %


 


Blood Gas Comments   333   


 


Crit Value Read Back   Y   


 


Blood Gas Notified Time   530   


 


Sodium    144  (132-148)  mmol/l


 


Potassium    4.3  (3.6-5.0)  MMOL/L


 


Chloride    103  ()  mmol/L


 


Carbon Dioxide    31 H  (22-30)  mmol/L


 


Anion Gap    14  (10-20)  


 


BUN    27 H  (9-20)  mg/dl


 


Creatinine    1.4  (0.8-1.5)  mg/dL


 


Est GFR (African Amer)    > 60  


 


Est GFR (Non-Af Amer)    53  


 


POC Glucose (mg/dL)  111 H    ()  mg/dL


 


Random Glucose    124 H  ()  mg/dL


 


Calcium    9.5  (8.4-10.2)  mg/dL


 


Total Bilirubin    0.5  (0.2-1.3)  mg/dl


 


AST    36  (17-59)  U/L


 


ALT    32  (21-72)  U/L


 


Alkaline Phosphatase    180 H  ()  U/L


 


Total Protein    8.2  (6.3-8.2)  G/DL


 


Albumin    3.6  (3.5-5.0)  g/dL


 


Globulin    4.7 H  (2.2-3.9)  gm/dL


 


Albumin/Globulin Ratio    0.8 L  (1.0-2.1)  














  12/29/15 Range/Units





  16:33 


 


WBC   (4.8-10.8)  K/uL


 


RBC   (4.40-5.90)  Mil/uL


 


Hgb   (12.0-18.0)  g/dL


 


Hct   (35.0-51.0)  %


 


MCV   (80.0-94.0)  fl


 


MCH   (27.0-31.0)  pg


 


MCHC   (33.0-37.0)  g/dL


 


RDW   (11.5-14.5)  %


 


Plt Count   (130-400)  K/uL


 


MPV   (7.2-11.7)  fl


 


Neut % (Auto)   (50.0-75.0)  %


 


Lymph % (Auto)   (20.0-40.0)  %


 


Mono % (Auto)   (0.0-10.0)  %


 


Eos % (Auto)   (0.0-4.0)  %


 


Baso % (Auto)   (0.0-2.0)  %


 


Neut #   (1.8-7.0)  K/uL


 


Lymph #   (1.0-4.3)  K/uL


 


Mono #   (0.0-0.8)  K/uL


 


Eos #   (0.0-0.7)  K/uL


 


Baso #   (0.0-0.2)  K/uL


 


pCO2   (35-45)  mm/Hg


 


pO2   ()  mm/Hg


 


HCO3   (21-28)  mmol/L


 


ABG pH   (7.35-7.45)  


 


ABG Total CO2   (22-28)  mmol/L


 


ABG O2 Saturation   (95-98)  %


 


ABG O2 Content   (15-23)  ML/dL


 


ABG Base Excess   (-2.0-3.0)  mmol/L


 


ABG Hemoglobin   (11.7-17.4)  g/dL


 


ABG Carboxyhemoglobin   (0.5-1.5)  %


 


POC ABG HHb (Measured)   (0.0-5.0)  %


 


ABG Methemoglobin   (0.0-3.0)  %


 


ABG O2 Capacity   (16-24)  mL/dL


 


Chu Test   


 


A-a O2 Difference   mm/Hg


 


Hgb O2 Saturation   (95.0-98.0)  %


 


FiO2   %


 


Blood Gas Comments   


 


Crit Value Read Back   


 


Blood Gas Notified Time   


 


Sodium   (132-148)  mmol/l


 


Potassium   (3.6-5.0)  MMOL/L


 


Chloride   ()  mmol/L


 


Carbon Dioxide   (22-30)  mmol/L


 


Anion Gap   (10-20)  


 


BUN   (9-20)  mg/dl


 


Creatinine   (0.8-1.5)  mg/dL


 


Est GFR (African Amer)   


 


Est GFR (Non-Af Amer)   


 


POC Glucose (mg/dL)  118 H  ()  mg/dL


 


Random Glucose   ()  mg/dL


 


Calcium   (8.4-10.2)  mg/dL


 


Total Bilirubin   (0.2-1.3)  mg/dl


 


AST   (17-59)  U/L


 


ALT   (21-72)  U/L


 


Alkaline Phosphatase   ()  U/L


 


Total Protein   (6.3-8.2)  G/DL


 


Albumin   (3.5-5.0)  g/dL


 


Globulin   (2.2-3.9)  gm/dL


 


Albumin/Globulin Ratio   (1.0-2.1)  








 Laboratory Results - last 24 hr











  12/29/15 12/30/15 12/30/15





  16:33 04:05 05:20


 


WBC   11.7 H 


 


RBC   3.02 L 


 


Hgb   9.8 L 


 


Hct   30.5 L 


 


MCV   101.0 H 


 


MCH   32.5 H 


 


MCHC   32.1 L 


 


RDW   13.6 


 


Plt Count   461 H 


 


MPV   8.0 


 


Neut % (Auto)   72.7 


 


Lymph % (Auto)   14.7 L 


 


Mono % (Auto)   8.0 


 


Eos % (Auto)   4.2 H 


 


Baso % (Auto)   0.4 


 


Neut #   8.5 H 


 


Lymph #   1.7 


 


Mono #   0.9 H 


 


Eos #   0.5 


 


Baso #   0.1 


 


pCO2    43


 


pO2    103 H


 


HCO3    30.9 H


 


ABG pH    7.48 H


 


ABG Total CO2    33.3 H


 


ABG O2 Saturation    98.6 H


 


ABG O2 Content    13.4 L


 


ABG Base Excess    7.7 H


 


ABG Hemoglobin    9.7 L


 


ABG Carboxyhemoglobin    1.2


 


POC ABG HHb (Measured)    1.4


 


ABG Methemoglobin    0.6


 


ABG O2 Capacity    13.6 L


 


Chu Test    Yes


 


A-a O2 Difference    128.0


 


Hgb O2 Saturation    96.8


 


FiO2    40.0


 


Blood Gas Comments    333


 


Crit Value Read Back    Y


 


Blood Gas Notified Time    530


 


Sodium   144 


 


Potassium   4.3 


 


Chloride   103 


 


Carbon Dioxide   31 H 


 


Anion Gap   14 


 


BUN   27 H 


 


Creatinine   1.4 


 


Est GFR ( Amer)   > 60 


 


Est GFR (Non-Af Amer)   53 


 


POC Glucose (mg/dL)  118 H  


 


Random Glucose   124 H 


 


Calcium   9.5 


 


Total Bilirubin   0.5 


 


AST   36 


 


ALT   32 


 


Alkaline Phosphatase   180 H 


 


Total Protein   8.2 


 


Albumin   3.6 


 


Globulin   4.7 H 


 


Albumin/Globulin Ratio   0.8 L 














  12/30/15





  05:50


 


WBC 


 


RBC 


 


Hgb 


 


Hct 


 


MCV 


 


MCH 


 


MCHC 


 


RDW 


 


Plt Count 


 


MPV 


 


Neut % (Auto) 


 


Lymph % (Auto) 


 


Mono % (Auto) 


 


Eos % (Auto) 


 


Baso % (Auto) 


 


Neut # 


 


Lymph # 


 


Mono # 


 


Eos # 


 


Baso # 


 


pCO2 


 


pO2 


 


HCO3 


 


ABG pH 


 


ABG Total CO2 


 


ABG O2 Saturation 


 


ABG O2 Content 


 


ABG Base Excess 


 


ABG Hemoglobin 


 


ABG Carboxyhemoglobin 


 


POC ABG HHb (Measured) 


 


ABG Methemoglobin 


 


ABG O2 Capacity 


 


Chu Test 


 


A-a O2 Difference 


 


Hgb O2 Saturation 


 


FiO2 


 


Blood Gas Comments 


 


Crit Value Read Back 


 


Blood Gas Notified Time 


 


Sodium 


 


Potassium 


 


Chloride 


 


Carbon Dioxide 


 


Anion Gap 


 


BUN 


 


Creatinine 


 


Est GFR ( Amer) 


 


Est GFR (Non-Af Amer) 


 


POC Glucose (mg/dL)  111 H


 


Random Glucose 


 


Calcium 


 


Total Bilirubin 


 


AST 


 


ALT 


 


Alkaline Phosphatase 


 


Total Protein 


 


Albumin 


 


Globulin 


 


Albumin/Globulin Ratio 











Fingerstick Blood Sugar Results: 118

## 2015-12-30 NOTE — RAD
PROCEDURE:  CHEST RADIOGRAPH, 1 VIEW



HISTORY:

Placement of tubing for mechanical ventilation



COMPARISON:

Multiple priors



FINDINGS:

There is an endotracheal tube with its tip at the thoracic inlet. The 

lungs appear clear. The heart is unchanged.  Osseous structures are 

normal. 



IMPRESSION:

Radiographically appropriate positioned ETT.

## 2015-12-31 LAB
BUN SERPL-MCNC: 20 MG/DL (ref 9–20)
CALCIUM SERPL-MCNC: 9.6 MG/DL (ref 8.4–10.2)
ERYTHROCYTE [DISTWIDTH] IN BLOOD BY AUTOMATED COUNT: 13.4 % (ref 11.5–14.5)
GFR NON-AFRICAN AMERICAN: > 60
HGB BLD-MCNC: 9.6 G/DL (ref 12–18)
MCH RBC QN AUTO: 31.6 PG (ref 27–31)
MCHC RBC AUTO-ENTMCNC: 31.5 G/DL (ref 33–37)
MCV RBC AUTO: 100.2 FL (ref 80–94)
PLATELET # BLD: 443 K/UL (ref 130–400)
RBC # BLD AUTO: 3.04 MIL/UL (ref 4.4–5.9)
WBC # BLD AUTO: 11.6 K/UL (ref 4.8–10.8)

## 2015-12-31 RX ADMIN — NITROGLYCERIN SCH IN: 20 OINTMENT TOPICAL at 09:40

## 2015-12-31 RX ADMIN — IPRATROPIUM BROMIDE AND ALBUTEROL SULFATE SCH ML: .5; 3 SOLUTION RESPIRATORY (INHALATION) at 20:20

## 2015-12-31 RX ADMIN — IPRATROPIUM BROMIDE AND ALBUTEROL SULFATE SCH ML: .5; 3 SOLUTION RESPIRATORY (INHALATION) at 08:37

## 2015-12-31 RX ADMIN — METOPROLOL TARTRATE SCH MG: 5 INJECTION INTRAVENOUS at 09:38

## 2015-12-31 RX ADMIN — Medication SCH ML: at 09:39

## 2015-12-31 RX ADMIN — IPRATROPIUM BROMIDE AND ALBUTEROL SULFATE SCH: .5; 3 SOLUTION RESPIRATORY (INHALATION) at 14:19

## 2015-12-31 RX ADMIN — NITROGLYCERIN SCH IN: 20 OINTMENT TOPICAL at 21:34

## 2015-12-31 RX ADMIN — ENOXAPARIN SODIUM SCH MG: 40 INJECTION SUBCUTANEOUS at 09:38

## 2015-12-31 RX ADMIN — IPRATROPIUM BROMIDE AND ALBUTEROL SULFATE SCH ML: .5; 3 SOLUTION RESPIRATORY (INHALATION) at 01:04

## 2015-12-31 RX ADMIN — NITROGLYCERIN SCH IN: 20 OINTMENT TOPICAL at 17:31

## 2015-12-31 RX ADMIN — NITROGLYCERIN SCH IN: 20 OINTMENT TOPICAL at 04:51

## 2015-12-31 NOTE — CP.PCM.PN
Subjective





- Subjective


Subjective: 


Patient has no new complaints.  By report he ambulated with PT today.





Review of Systems





- Review of Systems


All systems: reviewed and no additional remarkable complaints except





Objective





- Vital Signs/Intake and Output


Vital Signs (last 24 hours): 


 Vital Signs - 24 hr











  12/30/15 12/30/15 12/30/15





  16:00 18:00 20:00


 


Temperature 98.8 F  98.5 F


 


Pulse Rate 100 H 100 H 109 H


 


Respiratory 23 15 21





Rate   


 


Blood Pressure 149/81 149/91 H 137/88


 


O2 Sat by Pulse 96 96 97





Oximetry   














  12/30/15 12/30/15 12/31/15





  21:22 22:00 00:00


 


Temperature   98.6 F


 


Pulse Rate 101 H 102 H 98 H


 


Respiratory  14 16





Rate   


 


Blood Pressure 140/93 H 142/100 H 144/98 H


 


O2 Sat by Pulse  96 100





Oximetry   














  12/31/15 12/31/15 12/31/15





  02:00 04:00 06:00


 


Temperature  98.5 F 


 


Pulse Rate 99 H 91 H 83


 


Respiratory 16 12 24





Rate   


 


Blood Pressure 144/92 H 154/93 H 140/86


 


O2 Sat by Pulse 99 98 100





Oximetry   














  12/31/15 12/31/15 12/31/15





  08:00 09:38 10:00


 


Temperature 97.4 F L  


 


Pulse Rate 85 96 H 91 H


 


Respiratory 27 H  15





Rate   


 


Blood Pressure 135/87 127/94 H 142/88


 


O2 Sat by Pulse 99  98





Oximetry   














  12/31/15 12/31/15





  12:00 14:00


 


Temperature 97.9 F 


 


Pulse Rate 83 106 H


 


Respiratory 22 19





Rate  


 


Blood Pressure 142/90 123/85


 


O2 Sat by Pulse 99 100





Oximetry  











Intake and Output (last 12 hours): 


 Intake & Output











 12/30/15 12/31/15 12/31/15





 18:59 06:59 18:59


 


Intake Total 430 200 800


 


Output Total 100 300 150


 


Balance 330 -100 650


 


Intake:   


 


  IV  0 


 


  Intake, Piggyback 100 200 200


 


  Oral 120 0 600


 


  Tube Feeding 60  


 


  Free Water Flush 150  


 


Output:   


 


  Urine 100 300 150


 


    Urethral (Lua) 100  


 


    Condom  300 150


 


Other:   


 


  # Voids   


 


    Urethral (Lua) 1  


 


  # Bowel Movements 1 1 1














- Medications


Medications: 


 Current Medications





Albuterol/Ipratropium (Duoneb 3 Mg/0.5 Mg (3 Ml) Ud)  3 ml INH RQ6 MAGDALENA


   Last Admin: 12/31/15 14:19 Dose:  Not Given


Enalapril Maleate (Vasotec)  10 mg PO DAILY [facility]


   Last Admin: 12/31/15 09:40 Dose:  10 mg


Enoxaparin Sodium (Lovenox)  40 mg SC DAILY [facility]


   Last Admin: 12/31/15 09:38 Dose:  40 mg


Famotidine (Pepcid)  20 mg GT BID [facility]


   Last Admin: 12/30/15 16:49 Dose:  Not Given


Cefepime HCl 2 gm/ Sodium (Chloride)  100 mls @ 100 mls/hr IVPB Q12 [facility]


   Last Admin: 12/31/15 09:39 Dose:  100 mls/hr


Levetiracetam 500 mg/ Sodium (Chloride)  105 mls @ 210 mls/hr IVPB Q12 [facility]


   Last Admin: 12/31/15 09:36 Dose:  210 mls/hr


Levetiracetam (Keppra)  500 mg PO BID [facility]


Metoprolol Tartrate (Lopressor)  100 mg PO Q12 [facility]


   Last Admin: 12/30/15 08:34 Dose:  100 mg


Multivitamins/Vitamin C (Multi-Delyn Liquid)  5 ml PO DAILY [facility]


   Last Admin: 12/31/15 09:39 Dose:  5 ml


Nitroglycerin (Nitro-Bid 2% Oint)  2 in TOP Q6 [facility]


   Last Admin: 12/31/15 09:40 Dose:  2 in











- Labs


Labs (last 24 hours): 


 Laboratory Results - last 24 hr











  12/31/15





  04:20


 


WBC  11.6 H


 


RBC  3.04 L


 


Hgb  9.6 L


 


Hct  30.5 L


 


MCV  100.2 H


 


MCH  31.6 H


 


MCHC  31.5 L


 


RDW  13.4


 


Plt Count  443 H


 


Sodium  144


 


Potassium  3.9


 


Chloride  104


 


Carbon Dioxide  31 H


 


Anion Gap  13


 


BUN  20


 


Creatinine  0.8


 


Est GFR ( Amer)  > 60


 


Est GFR (Non-Af Amer)  > 60


 


Random Glucose  101


 


Calcium  9.6














- Constitutional


Appears: Non-toxic





- Head Exam


Head Exam: NORMAL INSPECTION





- Eye Exam


Eye Exam: Normal appearance





- ENT Exam


ENT Exam: Mucous Membranes Moist





- Neck Exam


Neck exam: Full Rom





- Respiratory Exam


Respiratory Exam: NORMAL BREATHING PATTERN





- Cardiovascular Exam


Cardiovascular Exam: REGULAR RHYTHM





- GI/Abdominal Exam


GI & Abdominal Exam: Normal Bowel Sounds





- Rectal Exam


Rectal Exam: Deferred





- Extremities Exam


Extremities exam: pedal edema





- Back Exam


Back exam: NORMAL INSPECTION





- Neurological Exam


Neurological exam: Alert





- Psychiatric Exam


Psychiatric exam: Normal Affect





- Skin


Skin Exam: Normal Color





Assessment/Plan


(1) Cardiac arrest


Assessment and plan: 


 





Patient has no current mental status.  I advise conservative medical therapy. 

No plans for cardiac cath.








Current Visit: Yes   Status: Acute





(2) NSTEMI (non-ST elevated myocardial infarction)


Assessment and plan: 


Patient may have underlying CAD as a cause of his acute event and cardiac 

arrest.  





Conservative therapy.


Current Visit: Yes   Status: Acute


Comment: 


Cont. ASA,  Vasotec and Lopressor








(3) Ventricular tachycardia


Assessment and plan: 


if recurrence recommend amiodarone drip.


Current Visit: Yes   Status: Acute





(4) HTN (hypertension)


Assessment and plan: 


add metoprolol


Current Visit: Yes   Status: Acute


Comment: 


Cont. ASA,  Vasotec and Lopressor

## 2015-12-31 NOTE — CP.PCM.PN
Subjective





- Subjective


Subjective: 


pt has been successfully extubated approx 24h ago.  tolerating airway and 

secretions.  no f/c, n/v/d. no distress. vss.  





Review of Systems





- Review of Systems


Systems not reviewed;Unavailable: Altered Mental Status





- Constitutional


Constitutional: UN





- EENT


Eyes: UNREMARKABLE


Ears: UNREMARKABLE


Nose/Mouth/Throat: UNREMARKABLE





- Cardiovascular


Cardiovascular: UNREMARKABLE





- Respiratory


Respiratory: As Per HPI





- Gastrointestinal


Gastrointestinal: UNREMARKABLE





- Genitourinary


Genitourinary: UNREMARKABLE





- Reproductive: Male


Reproductive:Male: UNREMARKABLE





- Musculoskeletal


Musculoskeletal: UNREMARKABLE





- Integumentary


Integumentary: UNREMARKABLE





- Neurological


Neurological: UNREMARKABLE





- Psychiatric


Psychiatric: UNREMARKABLE





- Endocrine


Endocrine: UNREMARKABLE





- Hematologic/Lymphatic


Hematologic: UNREMARKABLE





Objective





- Vital Signs/Intake and Output


Vital Signs (last 24 hours): 


 Vital Signs - 24 hr











  12/30/15 12/30/15 12/30/15





  10:00 12:23 14:00


 


Temperature  98.8 F 


 


Pulse Rate 84 91 H 95 H


 


Respiratory 25 H 24 12





Rate   


 


Blood Pressure 105/74 114/72 127/82


 


O2 Sat by Pulse 100 95 94 L





Oximetry   














  12/30/15 12/30/15 12/30/15





  16:00 18:00 20:00


 


Temperature 98.8 F  98.5 F


 


Pulse Rate 100 H 100 H 109 H


 


Respiratory 23 15 21





Rate   


 


Blood Pressure 149/81 149/91 H 137/88


 


O2 Sat by Pulse 96 96 97





Oximetry   














  12/30/15 12/30/15 12/31/15





  21:22 22:00 00:00


 


Temperature   98.6 F


 


Pulse Rate 101 H 102 H 98 H


 


Respiratory  14 16





Rate   


 


Blood Pressure 140/93 H 142/100 H 144/98 H


 


O2 Sat by Pulse  96 100





Oximetry   














  12/31/15 12/31/15 12/31/15





  02:00 04:00 06:00


 


Temperature  98.5 F 


 


Pulse Rate 99 H 91 H 83


 


Respiratory 16 12 24





Rate   


 


Blood Pressure 144/92 H 154/93 H 140/86


 


O2 Sat by Pulse 99 98 100





Oximetry   














  12/31/15





  08:00


 


Temperature 97.4 F L


 


Pulse Rate 85


 


Respiratory 27 H





Rate 


 


Blood Pressure 135/87


 


O2 Sat by Pulse 99





Oximetry 











Intake and Output (last 12 hours): 


 Intake & Output











 12/30/15 12/31/15 12/31/15





 18:59 06:59 18:59


 


Intake Total 430 200 


 


Output Total 100 300 150


 


Balance 330 -100 -150


 


Intake:   


 


  IV  0 


 


  Intake, Piggyback 100 200 


 


  Oral 120 0 


 


  Tube Feeding 60  


 


  Free Water Flush 150  


 


Output:   


 


  Urine 100 300 150


 


    Urethral (Lua) 100  


 


    Condom  300 150


 


Other:   


 


  # Voids   


 


    Urethral (Lua) 1  


 


  # Bowel Movements 1 1 














- Medications


Medications: 


 Current Medications





Albuterol/Ipratropium (Duoneb 3 Mg/0.5 Mg (3 Ml) Ud)  3 ml INH RQ6 Pending sale to [facility]


   Last Admin: 12/31/15 08:37 Dose:  3 ml


Enalapril Maleate (Vasotec)  10 mg PO DAILY Pending sale to [facility]


   Last Admin: 12/30/15 08:38 Dose:  10 mg


Enoxaparin Sodium (Lovenox)  40 mg SC DAILY Pending sale to [facility]


   Last Admin: 12/30/15 08:34 Dose:  40 mg


Famotidine (Pepcid)  20 mg GT BID Pending sale to [facility]


   Last Admin: 12/30/15 16:49 Dose:  Not Given


Cefepime HCl 2 gm/ Sodium (Chloride)  100 mls @ 100 mls/hr IVPB Q12 Pending sale to [facility]


   Last Admin: 12/30/15 20:51 Dose:  100 mls/hr


Levetiracetam 500 mg/ Sodium (Chloride)  105 mls @ 210 mls/hr IVPB Q12 Pending sale to [facility]


   Last Admin: 12/30/15 21:55 Dose:  210 mls/hr


Levetiracetam (Keppra)  500 mg PO BID Pending sale to [facility]


   Last Admin: 12/30/15 16:47 Dose:  Not Given


Lorazepam (Ativan)  2 mg IVP Q4 PRN


   PRN Reason: Agitation


   Last Admin: 12/30/15 08:30 Dose:  2 mg


Metoprolol Tartrate (Lopressor)  100 mg PO Q12 Pending sale to [facility]


   Last Admin: 12/30/15 08:34 Dose:  100 mg


Metoprolol Tartrate (Lopressor)  5 mg IVP Q12 Pending sale to [facility]


   Last Admin: 12/30/15 21:22 Dose:  5 mg


Morphine Sulfate (Morphine)  2 mg IVP Q4 PRN


   PRN Reason: Agitation


   Last Admin: 12/30/15 05:45 Dose:  2 mg


Multivitamins/Vitamin C (Multi-Delyn Liquid)  5 ml PO DAILY [facility]ending [facility] to [facility]


   Last Admin: 12/30/15 08:37 Dose:  5 ml


Nitroglycerin (Nitro-Bid 2% Oint)  2 in TOP Q6 Pending sale to [facility]


   Last Admin: 12/31/15 04:51 Dose:  2 in











- Labs


Labs (last 24 hours): 


 Laboratory Results - last 24 hr











  12/30/15 12/31/15





  11:30 04:20


 


WBC   11.6 H


 


RBC   3.04 L


 


Hgb   9.6 L


 


Hct   30.5 L


 


MCV   100.2 H


 


MCH   31.6 H


 


MCHC   31.5 L


 


RDW   13.4


 


Plt Count   443 H


 


Sodium   144


 


Potassium   3.9


 


Chloride   104


 


Carbon Dioxide   31 H


 


Anion Gap   13


 


BUN   20


 


Creatinine   0.8


 


Est GFR ( Amer)   > 60


 


Est GFR (Non-Af Amer)   > 60


 


POC Glucose (mg/dL)  91 


 


Random Glucose   101


 


Calcium   9.6














- Constitutional


Appears: Non-toxic, No Acute Distress, Chronically Ill





- Head Exam


Head Exam: ATRAUMATIC, NORMAL INSPECTION, NORMOCEPHALIC





- Eye Exam


Eye Exam: EOMI





- Respiratory Exam


Respiratory Exam: Clear to PA & Lateral, NORMAL BREATHING PATTERN, UNREMARKABLE


Additional comments: 


on venti mask





- Cardiovascular Exam


Cardiovascular Exam: REGULAR RHYTHM, RRR





- GI/Abdominal Exam


GI & Abdominal Exam: Normal Bowel Sounds, Soft, Unremarkable





- Extremities Exam


Extremities exam: full ROM, normal capillary refill, normal inspection, pedal 

pulses present





- Back Exam


Back exam: FULL ROM





- Neurological Exam


Neurological exam: Alert, CN II-XII Intact, Oriented x3, Reflexes Normal





- Psychiatric Exam


Psychiatric exam: Flat Affect, Normal Mood





- Skin


Skin Exam: Dry, Normal Color, Warm


Additional comments: 


wounds as per nurses notes





Assessment/Plan


(1) Cardiac arrest


Current Visit: Yes   Status: Acute


Comment: Anoxic brain injury


Cont. ASA,  Vasotec and Lopressor


no cardio interventions planned at present.


extubated, will still likely need BRITTA








(2) DVT prophylaxis


Current Visit: Yes   Status: Acute


Comment: SCDs and sage renew lovenox








(3) Scrotal edema


Current Visit: Yes   Status: Acute


Comment: scrotal us-noted


urology consult-dr cortes to see pt when stable








(4) History of seizure


Current Visit: Yes   Status: Acute


Comment: Cont. keppra 500 mg BID


Seizure Precatuion








(5) Diabetes mellitus with hyperglycemia


Current Visit: Yes   Status: Chronic


Comment: riss per protocol


much more controlled, will cont ot monitor








(6) Agitation


Current Visit: Yes   Status: Acute


Comment: morphine prn


ativan prn








(7) Aspiration pneumonia


Current Visit: Yes   Status: Acute


Comment: CXR improving


cont IV Cefapime


BD NEBS Q 6H PRN


psudomonas in trach asp


ID f/u appretiated


Daily Vent weaning trials


likely will need trach and LTAC placement 








(8) Acute respiratory failure


Current Visit: Yes   Status: Acute


Comment: cont vent, will need trach


CXR 12/28/15 


Cardiomegaly. 


Improving vascular congestion. 


Slightly improved patchy infiltrate left mid and lower lung field.  Minor mild 

increased density right lung base


small pleural effusion

















- Assessment and Plan (Free Text)


Assessment: 


pt comfortable on ventimask, will monitor airway


speech, pt/ot consults pening

## 2015-12-31 NOTE — CP.CCUPN
CCU Subjective





- Physician Review


Subjective (Free Text): 


***INTENSIVIST PROGRESS NOTE***


Patient examined, interim events reviewed:





easily awakens from sleep when name is called, still essentially nonverbal 

since extubation yesterday, unclear if he is aphasic as a result of anoxic 

encephalopathy. No further tachyarrthmias which were probably related to 

episodes of concomitant agitation when intubated. No new ventricular ectopy.   

Afebrile, no temp spikes overnight, HR 85 sinus, /86, RR 24, SPO2 100% on 

40% VM. 24H I/Os = 630/400ml.








EXAM-


HEENT: no icterus, no nystagmus


NECK: no visible JVD, supple, carotids equal upstroke bilat/no bruits


CHEST: decreased BS bases, no wheezes audible


HEART:  regular, distant, S1S2, no murmur audible, no rubs.


ABD:  soft, no increased distention, no focal tenderness,  no HSM. BS hypoactive

, 


EXT:    +UE bilat edema decreasing, no peripheral/ digital cyanosis, no 

palpable cords, distal pulses intact and symmetrical, SCDs on bilat. 


NEURO:  no focal neuro motor deficits. 


SKIN:   no rashes





LABS


WBC= 11.6


HGB= 9.6


PLTs = 443K





Na= 144


K=  3.9


HCO3=31


BUN/Cr=  20/0.8


BS= 101











Assessment: 


1.   Cardiac Arrest with resuscitation from VT. 


2.   Post-Resuscitation Therapeutic Hypothermia-completed


3.   Anoxic Encephalopathy


4.     Aspiration Pneumonia


5.    H/o Seizure Disorder with recurrence








PLAN:


1.   Extubated yesterday after spending 23 days on MV support. Latest CXR show 

good improvement in previous bilateral infiltrative pattern. Check PCT level, 

could stop abx soon, discuss with ID.


2.   Swallow eval, PT/OT evals as tolerated.


3.   Unclear why central venous access discontinued when peripheral IV access 

is still very difficult to obtain and maintain. No indication of TLC malfunction

, malposition,  nor signs of line sepsis evident.Two-physician consent 

guidelines for PICC line  as per Risk Mgmt noted. He remains incompetent to 

sign consent for himself. 


4.   Maintain humidifed oxygen to prevent inspissation of secretions if cough 

is ineffective; and IVF hydration if PO intake is poor. 




















CCU Objective





- Vital Signs / Intake & Output


Vital Signs (Last 4 hours): 


Vital Signs











  Temp Pulse Resp BP Pulse Ox


 


 12/31/15 06:00   83  24  140/86  100


 


 12/31/15 04:00  98.5 F  91 H  12  154/93 H  98











Intake and Output (Last 8hrs): 


 Intake & Output











 12/30/15 12/31/15 12/31/15





 22:59 06:59 14:59


 


Intake Total 200 0 


 


Output Total  300 


 


Balance 200 -300 


 


Intake:   


 


  IV  0 


 


  Intake, Piggyback 200  


 


  Oral  0 


 


Output:   


 


  Urine  300 


 


    Condom  300 


 


Other:   


 


  # Bowel Movements 1  














- Physical Exam


Head: Positive for: Atraumatic, Normocephalic


Pupils: Positive for: PERRL


Extroacular Muscles: Positive for: EOMI


Conjunctiva: Positive for: Normal.  Negative for: Injected, Icteric


Neck: Positive for: Normal Range of Motion, Trachea Midline.  Negative for: 

Meningeal Signs, MIDLINE TENDERNESS, Paraspinal Tenderness, JVD, Lymphadenopathy

, Bruit, Other


Respiratory/Chest: Positive for: Clear to Auscultation, Good Air Exchange.  

Negative for: Respiratory Distress, Accessory Muscle Use, Wheezes, Decreased 

Breath Sounds, Rales, Retracting, Rhonchi, Tachypneic, Tender to Palpation, 

Other


Cardiovascular: Positive for: Normal S1, S2, Irregular Rhythm, Peripheal Pulses 

Present.  Negative for: Murmurs


Abdomen: Positive for: Normal Bowel Sounds.  Negative for: Tenderness, 

Distention, Peritoneal Signs


Upper Extremity: Positive for: Normal Inspection.  Negative for: Cyanosis, Edema


Lower Extremity: Positive for: Normal Inspection.  Negative for: Edema, CALF 

TENDERNESS





- Medications


Active Medications: 


Active Medications











Generic Name Dose Route Start Last Admin





  Trade Name Freq  PRN Reason Stop Dose Admin


 


Albuterol/Ipratropium  3 ml 12/18/15 08:00 12/31/15 01:04





  Duoneb 3 Mg/0.5 Mg (3 Ml) Ud  INH   3 ml





  RQ6 MAGDALENA   Administration


 


Enalapril Maleate  10 mg 12/10/15 11:15 12/30/15 08:38





  Vasotec  PO   10 mg





  DAILY MAGDALENA   Administration


 


Enoxaparin Sodium  40 mg 12/29/15 09:00 12/30/15 08:34





  Lovenox  SC   40 mg





  DAILY MAGDALENA   Administration


 


Famotidine  20 mg 12/10/15 17:00 12/30/15 16:49





  Pepcid  GT   Not Given





  BID MAGDALENA   


 


Cefepime HCl 2 gm/ Sodium  100 mls @ 100 mls/hr 12/21/15 21:00 12/30/15 20:51





  Chloride  IVPB   100 mls/hr





  Q12 MAGDALENA   Administration


 


Levetiracetam 500 mg/ Sodium  105 mls @ 210 mls/hr 12/30/15 21:00 12/30/15 21:55





  Chloride  IVPB   210 mls/hr





  Q12 MAGDALENA   Administration


 


Levetiracetam  500 mg 12/18/15 09:00 12/30/15 16:47





  Keppra  PO   Not Given





  BID MAGDALENA   


 


Lorazepam  2 mg 12/27/15 21:49 12/30/15 08:30





  Ativan  IVP   2 mg





  Q4 PRN   Administration





  Agitation   


 


Metoprolol Tartrate  100 mg 12/24/15 09:00 12/30/15 08:34





  Lopressor  PO   100 mg





  Q12 MAGDALENA   Administration


 


Metoprolol Tartrate  5 mg 12/30/15 21:00 12/30/15 21:22





  Lopressor  IVP   5 mg





  Q12 MAGDALENA   Administration


 


Morphine Sulfate  2 mg 12/27/15 21:49 12/30/15 05:45





  Morphine  IVP   2 mg





  Q4 PRN   Administration





  Agitation   


 


Multivitamins/Vitamin C  5 ml 12/10/15 09:00 12/30/15 08:37





  Multi-Delyn Liquid  PO   5 ml





  DAILY [facility]DALENA   Administration


 


Nitroglycerin  2 in 12/10/15 16:00 12/31/15 04:51





  Nitro-Bid 2% Oint  TOP   2 in





  Q6 MAGDALENA   Administration














- Patient Studies


Lab Studies: 


 Lab Studies











  12/31/15 12/30/15 Range/Units





  04:20 11:30 


 


WBC  11.6 H   (4.8-10.8)  K/uL


 


RBC  3.04 L   (4.40-5.90)  Mil/uL


 


Hgb  9.6 L   (12.0-18.0)  g/dL


 


Hct  30.5 L   (35.0-51.0)  %


 


MCV  100.2 H   (80.0-94.0)  fl


 


MCH  31.6 H   (27.0-31.0)  pg


 


MCHC  31.5 L   (33.0-37.0)  g/dL


 


RDW  13.4   (11.5-14.5)  %


 


Plt Count  443 H   (130-400)  K/uL


 


Sodium  144   (132-148)  mmol/l


 


Potassium  3.9   (3.6-5.0)  MMOL/L


 


Chloride  104   ()  mmol/L


 


Carbon Dioxide  31 H   (22-30)  mmol/L


 


Anion Gap  13   (10-20)  


 


BUN  20   (9-20)  mg/dl


 


Creatinine  0.8   (0.8-1.5)  mg/dL


 


Est GFR (African Amer)  > 60   


 


Est GFR (Non-Af Amer)  > 60   


 


POC Glucose (mg/dL)   91  ()  mg/dL


 


Random Glucose  101   ()  mg/dL


 


Calcium  9.6   (8.4-10.2)  mg/dL








 Laboratory Results - last 24 hr











  12/30/15 12/31/15





  11:30 04:20


 


WBC   11.6 H


 


RBC   3.04 L


 


Hgb   9.6 L


 


Hct   30.5 L


 


MCV   100.2 H


 


MCH   31.6 H


 


MCHC   31.5 L


 


RDW   13.4


 


Plt Count   443 H


 


Sodium   144


 


Potassium   3.9


 


Chloride   104


 


Carbon Dioxide   31 H


 


Anion Gap   13


 


BUN   20


 


Creatinine   0.8


 


Est GFR ( Amer)   > 60


 


Est GFR (Non-Af Amer)   > 60


 


POC Glucose (mg/dL)  91 


 


Random Glucose   101


 


Calcium   9.6











Fingerstick Blood Sugar Results: 96

## 2015-12-31 NOTE — CP.PCM.PN
Subjective





- Subjective


Subjective: 


remains extubated in ICU 


iv rx in progress





Review of Systems





- Review of Systems


All systems: reviewed and no additional remarkable complaints except





- Constitutional


Constitutional: absent: Fever





Objective





- Vital Signs/Intake and Output


Vital Signs (last 24 hours): 


 Vital Signs - 24 hr











  12/30/15 12/30/15 12/30/15





  12:23 14:00 16:00


 


Temperature 98.8 F  98.8 F


 


Pulse Rate 91 H 95 H 100 H


 


Respiratory 24 12 23





Rate   


 


Blood Pressure 114/72 127/82 149/81


 


O2 Sat by Pulse 95 94 L 96





Oximetry   














  12/30/15 12/30/15 12/30/15





  18:00 20:00 21:22


 


Temperature  98.5 F 


 


Pulse Rate 100 H 109 H 101 H


 


Respiratory 15 21 





Rate   


 


Blood Pressure 149/91 H 137/88 140/93 H


 


O2 Sat by Pulse 96 97 





Oximetry   














  12/30/15 12/31/15 12/31/15





  22:00 00:00 02:00


 


Temperature  98.6 F 


 


Pulse Rate 102 H 98 H 99 H


 


Respiratory 14 16 16





Rate   


 


Blood Pressure 142/100 H 144/98 H 144/92 H


 


O2 Sat by Pulse 96 100 99





Oximetry   














  12/31/15 12/31/15 12/31/15





  04:00 06:00 08:00


 


Temperature 98.5 F  97.4 F L


 


Pulse Rate 91 H 83 85


 


Respiratory 12 24 27 H





Rate   


 


Blood Pressure 154/93 H 140/86 135/87


 


O2 Sat by Pulse 98 100 99





Oximetry   














  12/31/15 12/31/15





  09:38 10:00


 


Temperature  


 


Pulse Rate 96 H 91 H


 


Respiratory  15





Rate  


 


Blood Pressure 127/94 H 142/88


 


O2 Sat by Pulse  98





Oximetry  











Intake and Output (last 12 hours): 


 Intake & Output











 12/30/15 12/31/15 12/31/15





 18:59 06:59 18:59


 


Intake Total 430 200 400


 


Output Total 100 300 150


 


Balance 330 -100 250


 


Intake:   


 


  IV  0 


 


  Intake, Piggyback 100 200 200


 


  Oral 120 0 200


 


  Tube Feeding 60  


 


  Free Water Flush 150  


 


Output:   


 


  Urine 100 300 150


 


    Urethral (Lua) 100  


 


    Condom  300 150


 


Other:   


 


  # Voids   


 


    Urethral (Lua) 1  


 


  # Bowel Movements 1 1 














- Medications


Medications: 


 Current Medications





Albuterol/Ipratropium (Duoneb 3 Mg/0.5 Mg (3 Ml) Ud)  3 ml INH RQ6 [facility]


   Last Admin: 12/31/15 08:37 Dose:  3 ml


Enalapril Maleate (Vasotec)  10 mg PO DAILY [facility]


   Last Admin: 12/31/15 09:40 Dose:  10 mg


Enoxaparin Sodium (Lovenox)  40 mg SC DAILY [facility]


   Last Admin: 12/31/15 09:38 Dose:  40 mg


Famotidine (Pepcid)  20 mg GT BID [facility]


   Last Admin: 12/30/15 16:49 Dose:  Not Given


Cefepime HCl 2 gm/ Sodium (Chloride)  100 mls @ 100 mls/hr IVPB Q12 [facility]


   Last Admin: 12/31/15 09:39 Dose:  100 mls/hr


Levetiracetam 500 mg/ Sodium (Chloride)  105 mls @ 210 mls/hr IVPB Q12 [facility]


   Last Admin: 12/31/15 09:36 Dose:  210 mls/hr


Levetiracetam (Keppra)  500 mg PO BID [facility]


Metoprolol Tartrate (Lopressor)  100 mg PO Q12 [facility]


   Last Admin: 12/30/15 08:34 Dose:  100 mg


Multivitamins/Vitamin C (Multi-Delyn Liquid)  5 ml PO DAILY [facility]


   Last Admin: 12/31/15 09:39 Dose:  5 ml


Nitroglycerin (Nitro-Bid 2% Oint)  2 in TOP Q6 [facility]


   Last Admin: 12/31/15 09:40 Dose:  2 in











- Labs


Labs (last 24 hours): 


 Laboratory Results - last 24 hr











  12/30/15 12/31/15





  11:30 04:20


 


WBC   11.6 H


 


RBC   3.04 L


 


Hgb   9.6 L


 


Hct   30.5 L


 


MCV   100.2 H


 


MCH   31.6 H


 


MCHC   31.5 L


 


RDW   13.4


 


Plt Count   443 H


 


Sodium   144


 


Potassium   3.9


 


Chloride   104


 


Carbon Dioxide   31 H


 


Anion Gap   13


 


BUN   20


 


Creatinine   0.8


 


Est GFR ( Amer)   > 60


 


Est GFR (Non-Af Amer)   > 60


 


POC Glucose (mg/dL)  91 


 


Random Glucose   101


 


Calcium   9.6














- Constitutional


Appears: Non-toxic, Older Than Stated Age, Chronically Ill





- Head Exam


Head Exam: NORMOCEPHALIC





- Eye Exam


Eye Exam: absent: Scleral icterus





- ENT Exam


ENT Exam: Mucous Membranes Dry





- Neck Exam


Neck exam: absent: Lymphadenopathy





- Respiratory Exam


Respiratory Exam: Decreased Breath Sounds, Rhonchi





- Cardiovascular Exam


Cardiovascular Exam: REGULAR RHYTHM, +S1, +S2





- GI/Abdominal Exam


GI & Abdominal Exam: Diminished Bowel Sounds, Soft.  absent: Tenderness





- Rectal Exam


Rectal Exam: Deferred





- Extremities Exam


Extremities exam: pedal pulses present.  absent: pedal edema, tenderness





- Back Exam


Back exam: absent: CVA tenderness (L), CVA tenderness (R)





- Neurological Exam


Neurological exam: Altered





Assessment/Plan


(1) Agitation


Current Visit: Yes   Status: Acute


Comment: morphine prn


ativan prn








(2) Aspiration pneumonia


Current Visit: Yes   Status: Acute


Comment: CXR improving


cont IV Cefapime


BD NEBS Q 6H PRN


psudomonas in trach asp


ID f/u appretiated


Daily Vent weaning trials


likely will need trach and LTAC placement 








(3) Cardiac arrest


Current Visit: Yes   Status: Acute


Comment: Anoxic brain injury


Cont. ASA,  Vasotec and Lopressor


no cardio interventions planned at present.


extubated, will still likely need BRITTA








(4) Diabetes mellitus with hyperglycemia


Current Visit: Yes   Status: Chronic


Comment: riss per protocol


much more controlled, will cont ot monitor








(5) History of seizure


Current Visit: Yes   Status: Acute


Comment: Cont. keppra 500 mg BID


Seizure Precatuion








(6) NSTEMI (non-ST elevated myocardial infarction)


Current Visit: Yes   Status: Acute


Comment: 


Cont. ASA,  Vasotec and Lopressor

## 2016-01-01 LAB
ALBUMIN SERPL-MCNC: 3.6 G/DL (ref 3.5–5)
ALBUMIN/GLOB SERPL: 0.8 {RATIO} (ref 1–2.1)
ALT SERPL-CCNC: 28 U/L (ref 21–72)
AST SERPL-CCNC: 29 U/L (ref 17–59)
BUN SERPL-MCNC: 20 MG/DL (ref 9–20)
CALCIUM SERPL-MCNC: 9.9 MG/DL (ref 8.4–10.2)
ERYTHROCYTE [DISTWIDTH] IN BLOOD BY AUTOMATED COUNT: 13.5 % (ref 11.5–14.5)
GFR NON-AFRICAN AMERICAN: > 60
HGB BLD-MCNC: 10 G/DL (ref 12–18)
MCH RBC QN AUTO: 31.9 PG (ref 27–31)
MCHC RBC AUTO-ENTMCNC: 32.1 G/DL (ref 33–37)
MCV RBC AUTO: 99.6 FL (ref 80–94)
PLATELET # BLD: 437 K/UL (ref 130–400)
RBC # BLD AUTO: 3.12 MIL/UL (ref 4.4–5.9)
WBC # BLD AUTO: 12.1 K/UL (ref 4.8–10.8)

## 2016-01-01 RX ADMIN — NITROGLYCERIN SCH IN: 20 OINTMENT TOPICAL at 15:05

## 2016-01-01 RX ADMIN — NITROGLYCERIN SCH IN: 20 OINTMENT TOPICAL at 05:10

## 2016-01-01 RX ADMIN — NITROGLYCERIN SCH IN: 20 OINTMENT TOPICAL at 11:15

## 2016-01-01 RX ADMIN — IPRATROPIUM BROMIDE AND ALBUTEROL SULFATE SCH ML: .5; 3 SOLUTION RESPIRATORY (INHALATION) at 13:26

## 2016-01-01 RX ADMIN — NITROGLYCERIN SCH IN: 20 OINTMENT TOPICAL at 22:20

## 2016-01-01 RX ADMIN — IPRATROPIUM BROMIDE AND ALBUTEROL SULFATE SCH ML: .5; 3 SOLUTION RESPIRATORY (INHALATION) at 19:40

## 2016-01-01 RX ADMIN — IPRATROPIUM BROMIDE AND ALBUTEROL SULFATE SCH ML: .5; 3 SOLUTION RESPIRATORY (INHALATION) at 07:39

## 2016-01-01 RX ADMIN — IPRATROPIUM BROMIDE AND ALBUTEROL SULFATE SCH ML: .5; 3 SOLUTION RESPIRATORY (INHALATION) at 00:59

## 2016-01-01 RX ADMIN — ENOXAPARIN SODIUM SCH MG: 40 INJECTION SUBCUTANEOUS at 08:29

## 2016-01-01 RX ADMIN — Medication SCH ML: at 08:29

## 2016-01-01 NOTE — CP.PCM.PN
Subjective





- Subjective


Subjective: 


pt seen in icu, awake, alert and able to states that he is feeling better and 

the year in [place]. has been tolerated oral diet.  no f/c, n/v/d. vss. nsr on 

monitor





Review of Systems





- Constitutional


Constitutional: UN





- EENT


Eyes: UNREMARKABLE


Ears: UNREMARKABLE


Nose/Mouth/Throat: UNREMARKABLE





- Cardiovascular


Cardiovascular: UNREMARKABLE





- Respiratory


Respiratory: UNREMARKABLE





- Gastrointestinal


Gastrointestinal: UNREMARKABLE





- Genitourinary


Genitourinary: UNREMARKABLE





- Reproductive: Male


Reproductive:Male: UNREMARKABLE





- Musculoskeletal


Musculoskeletal: UNREMARKABLE





- Integumentary


Integumentary: UNREMARKABLE





- Neurological


Neurological: UNREMARKABLE





- Psychiatric


Psychiatric: UNREMARKABLE





- Endocrine


Endocrine: UNREMARKABLE





- Hematologic/Lymphatic


Hematologic: UNREMARKABLE





Objective





- Vital Signs/Intake and Output


Vital Signs (last 24 hours): 


 Vital Signs - 24 hr











  12/31/15 12/31/15 12/31/15





  09:38 10:00 12:00


 


Temperature   97.9 F


 


Pulse Rate 96 H 91 H 83


 


Respiratory  15 22





Rate   


 


Blood Pressure 127/94 H 142/88 142/90


 


O2 Sat by Pulse  98 99





Oximetry   














  12/31/15 12/31/15 12/31/15





  14:00 16:00 16:03


 


Temperature  97.9 F 


 


Pulse Rate 106 H 100 H 


 


Respiratory 19 12 





Rate   


 


Blood Pressure 123/85 117/76 


 


O2 Sat by Pulse 100 97 100





Oximetry   














  12/31/15 12/31/15 12/31/15





  18:00 20:00 22:00


 


Temperature  98.5 F 


 


Pulse Rate 86 89 89


 


Respiratory 14 23 18





Rate   


 


Blood Pressure 133/85 147/93 H 132/85


 


O2 Sat by Pulse 96 97 96





Oximetry   














  01/01/16 01/01/16 01/01/16





  00:00 02:00 04:00


 


Temperature 98.9 F  98.2 F


 


Pulse Rate 83 86 77


 


Respiratory 20 17 17





Rate   


 


Blood Pressure 144/74 120/76 132/80


 


O2 Sat by Pulse 100 98 97





Oximetry   














  01/01/16 01/01/16





  06:00 08:00


 


Temperature  98.4 F


 


Pulse Rate 83 94 H


 


Respiratory 19 11 L





Rate  


 


Blood Pressure 137/83 134/60


 


O2 Sat by Pulse 97 100





Oximetry  











Intake and Output (last 12 hours): 


 Intake & Output











 12/31/15 01/01/16 01/01/16





 18:59 06:59 18:59


 


Intake Total 800 250 


 


Output Total 150  


 


Balance 650 250 


 


Intake:   


 


  Intake, Piggyback 200 200 


 


  Oral 600 50 


 


Output:   


 


  Urine 150  


 


    Condom 150  


 


Other:   


 


  # Bowel Movements 1 1 














- Medications


Medications: 


 Current Medications





Albuterol/Ipratropium (Duoneb 3 Mg/0.5 Mg (3 Ml) Ud)  3 ml INH [facility]


   Last Admin: 01/01/16 07:39 Dose:  3 ml


Enalapril Maleate (Vasotec)  10 mg PO DAILY [facility]


   Last Admin: 01/01/16 08:29 Dose:  10 mg


Enoxaparin Sodium (Lovenox)  40 mg SC DAILY [facility]


   Last Admin: 01/01/16 08:29 Dose:  40 mg


Famotidine (Pepcid)  20 mg GT BID [facility]


   Last Admin: 12/30/15 16:49 Dose:  Not Given


Cefepime HCl 2 gm/ Sodium (Chloride)  100 mls @ 100 mls/hr IVPB Q12 [facility]


   Last Admin: 01/01/16 08:09 Dose:  100 mls/hr


Levetiracetam (Keppra)  500 mg PO BID [facility]


   Last Admin: 01/01/16 08:10 Dose:  500 mg


Metoprolol Tartrate (Lopressor)  100 mg PO Q12 [facility]


   Last Admin: 01/01/16 08:10 Dose:  100 mg


Multivitamins/Vitamin C (Multi-Delyn Liquid)  5 ml PO DAILY [facility]


   Last Admin: 01/01/16 08:29 Dose:  5 ml


Nitroglycerin (Nitro-Bid 2% Oint)  2 in TOP Q6 [facility]


   Last Admin: 01/01/16 05:10 Dose:  2 in











- Labs


Labs (last 24 hours): 


 Laboratory Results - last 24 hr











  12/31/15 01/01/16 01/01/16





  22:23 05:00 05:41


 


WBC   12.1 H 


 


RBC   3.12 L 


 


Hgb   10.0 L 


 


Hct   31.1 L 


 


MCV   99.6 H 


 


MCH   31.9 H 


 


MCHC   32.1 L 


 


RDW   13.5 


 


Plt Count   437 H 


 


Sodium   145 


 


Potassium   4.0 


 


Chloride   104 


 


Carbon Dioxide   30 


 


Anion Gap   15 


 


BUN   20 


 


Creatinine   0.8 


 


Est GFR ( Amer)   > 60 


 


Est GFR (Non-Af Amer)   > 60 


 


POC Glucose (mg/dL)  103   83


 


Random Glucose   104 


 


Calcium   9.9 


 


Total Bilirubin   0.7 


 


AST   29 


 


ALT   28 


 


Alkaline Phosphatase   154 H 


 


Total Protein   8.4 H 


 


Albumin   3.6 


 


Globulin   4.8 H 


 


Albumin/Globulin Ratio   0.8 L 














- Constitutional


Appears: Non-toxic, No Acute Distress





- Head Exam


Head Exam: ATRAUMATIC, NORMAL INSPECTION, NORMOCEPHALIC





- Eye Exam


Eye Exam: EOMI





- Respiratory Exam


Respiratory Exam: Clear to PA & Lateral, NORMAL BREATHING PATTERN, UNREMARKABLE





- Cardiovascular Exam


Cardiovascular Exam: REGULAR RHYTHM, RRR





- GI/Abdominal Exam


GI & Abdominal Exam: Normal Bowel Sounds, Soft, Unremarkable





- Extremities Exam


Extremities exam: full ROM, normal capillary refill, normal inspection, pedal 

pulses present





- Back Exam


Back exam: FULL ROM





- Neurological Exam


Neurological exam: Abnormal Gait, Alert, CN II-XII Intact, Normal Gait





- Psychiatric Exam


Psychiatric exam: Normal Affect, Normal Mood





- Skin


Skin Exam: Dry, Normal Color, Warm


Additional comments: 


skin as noted in nurses notes





Assessment/Plan


(1) Cardiac arrest


Current Visit: Yes   Status: Acute


Comment: Anoxic brain injury


Cont. ASA,  Vasotec and Lopressor


no cardio interventions planned at present.


extubated, will still likely need BRITTA


cont guardianship process








(2) DVT prophylaxis


Current Visit: Yes   Status: Acute


Comment: SCDs and sage renew lovenox








(3) Scrotal edema


Current Visit: Yes   Status: Acute


Comment: scrotal us-noted


urology consult-dr cortes to see pt when stable








(4) History of seizure


Current Visit: Yes   Status: Acute


Comment: Cont. keppra 500 mg BID


Seizure Precatuion








(5) Diabetes mellitus with hyperglycemia


Current Visit: Yes   Status: Chronic


Comment: riss per protocol


much more controlled, will cont ot monitor








(6) Agitation


Current Visit: Yes   Status: Acute


Comment: morphine prn


ativan prn








(7) Aspiration pneumonia


Current Visit: Yes   Status: Acute


Comment: CXR improving


cont IV Cefapime


BD NEBS Q 6H PRN


psudomonas in trach asp


ID f/u appretiated


Daily Vent weaning trials


likely will need trach and LTAC placement 








(8) Acute respiratory failure


Current Visit: Yes   Status: Acute


Comment: cont vent, will need trach


CXR 12/28/15 


Cardiomegaly. 


Improving vascular congestion. 


Slightly improved patchy infiltrate left mid and lower lung field.  Minor mild 

increased density right lung base


small pleural effusion

















- Assessment and Plan (Free Text)


Assessment: 


will repanculture after course of anbx

## 2016-01-01 NOTE — CP.CCUPN
CCU Subjective





- Physician Review


Subjective (Free Text): 


***INTENSIVIST PROGRESS NOTE***


Patient examined, interim events reviewed:





Awake and alert since extubation 3 days ago, unclear if he is dysphasic as a 

result of anoxic encephalopathy. No further tachyarrthmias which were probably 

related to episodes of concomitant agitation when intubated. No new ventricular 

ectopy.   Afebrile, no temp spikes overnight, HR 83 sinus, /80,  RR 19, 

SPO2 97% on RA. 


24H I/Os = 1050/150ml.








EXAM-


HEENT: no icterus, no nystagmus


NECK: no visible JVD, supple, carotids equal upstroke bilat/no bruits


CHEST: decreased BS bases, no wheezes audible


HEART:  regular, distant, S1S2, no murmur audible, no rubs.


ABD:  soft, no increased distention, no focal tenderness,  no HSM. BS hypoactive

, 


EXT:    +UE bilat edema decreasing, no peripheral/ digital cyanosis, no 

palpable cords, distal pulses intact and symmetrical, SCDs on bilat. 


NEURO:  no focal neuro motor deficits. 


SKIN:   no rashes





LABS


WBC= 12.1


HGB= 10.0


PLTs = 437K





Na= 145


K=  4.0


HCO3=30


BUN/Cr=  20/0.8


BS= 104











Assessment: 


1.   Cardiac Arrest with resuscitation from VT. 


2.   Post-Resuscitation Therapeutic Hypothermia-completed


3.   Anoxic Encephalopathy


4.     Aspiration Pneumonia


5.    H/o Seizure Disorder with recurrence








PLAN:


1. Continued improvement in neuromental status since extubation ( sedated on a 

daily basis when on MV). 


2. Get OOB, PT/OT/Speech evals.


3. Continue Keppra, Lopressor, ASA. 


4. Has been on Abx for over 3 weeks, could stop soon or even now and follow 

Temps, WBC, CXR.


5. Stable for stepdown bed. 








CCU Objective





- Patient Studies


Fingerstick Blood Sugar Results: 83





Critical Care Progress Note





- Nutrition


Nutrition: 


 Nutrition











 Category Date Time Status


 


 Dysphagia/Modified Consistency Diet [DIET] Diets  12/31/15 Lunch Active











 12/31/15 01/01/16 01/01/16





 22:59 06:59 14:59


 


Intake Total 250  


 


Balance 250  


 


Intake:   


 


  Intake, Piggyback 200  


 


  Oral 50  


 


Other:   


 


  # Bowel Movements 1 1 














- Physical Exam


Head: Positive for: Atraumatic, Normocephalic


Pupils: Positive for: PERRL


Extroacular Muscles: Positive for: EOMI


Conjunctiva: Positive for: Normal.  Negative for: Injected, Icteric


Neck: Positive for: Normal Range of Motion, Trachea Midline.  Negative for: 

Meningeal Signs, MIDLINE TENDERNESS, Paraspinal Tenderness, JVD, Lymphadenopathy

, Bruit, Other


Respiratory/Chest: Positive for: Clear to Auscultation, Good Air Exchange.  

Negative for: Respiratory Distress, Accessory Muscle Use, Wheezes, Decreased 

Breath Sounds, Rales, Retracting, Rhonchi, Tachypneic, Tender to Palpation, 

Other


Cardiovascular: Positive for: Normal S1, S2, Irregular Rhythm, Peripheal Pulses 

Present.  Negative for: Murmurs


Abdomen: Positive for: Normal Bowel Sounds.  Negative for: Tenderness, 

Distention, Peritoneal Signs


Upper Extremity: Positive for: Normal Inspection.  Negative for: Cyanosis, Edema


Lower Extremity: Positive for: Normal Inspection.  Negative for: Edema, CALF 

TENDERNESS





- Medications


Active Medications: 


Active Medications











Generic Name Dose Route Start Last Admin





  Trade Name Freq  PRN Reason Stop Dose Admin


 


Albuterol/Ipratropium  3 ml 12/18/15 08:00 01/01/16 07:39





  Duoneb 3 Mg/0.5 Mg (3 Ml) Ud  INH   3 ml





  RQ6 MAGDALENA   Administration


 


Enalapril Maleate  10 mg 12/10/15 11:15 01/01/16 08:29





  Vasotec  PO   10 mg





  DAILY MAG[facility]   Administration


 


Enoxaparin Sodium  40 mg 12/29/15 09:00 01/01/16 08:29





  Lovenox  SC   40 mg





  DAILY [facility]   Administration


 


Famotidine  20 mg 12/10/15 17:00 12/30/15 16:49





  Pepcid  GT   Not Given





  BID MAGDALENA   


 


Cefepime HCl 2 gm/ Sodium  100 mls @ 100 mls/hr 12/21/15 21:00 01/01/16 08:09





  Chloride  IVPB   100 mls/hr





  Q12 MAG[facility]   Administration


 


Levetiracetam  500 mg 12/31/15 17:00 01/01/16 08:10





  Keppra  PO   500 mg





  BID [facility]   Administration


 


Metoprolol Tartrate  100 mg 12/24/15 09:00 01/01/16 08:10





  Lopressor  PO   100 mg





  Q12 [facility]   Administration


 


Multivitamins/Vitamin C  5 ml 12/10/15 09:00 01/01/16 08:29





  Multi-Delyn Liquid  PO   5 ml





  DAILY [facility]AL[facility]   Administration


 


Nitroglycerin  2 in 12/10/15 16:00 01/01/16 05:10





  Nitro-Bid 2% Oint  TOP   2 in





  Q6 [facility]   Administration














- Patient Studies


Lab Studies: 


 Lab Studies











  01/01/16 01/01/16 12/31/15 Range/Units





  05:41 05:00 22:23 


 


WBC   12.1 H   (4.8-10.8)  K/uL


 


RBC   3.12 L   (4.40-5.90)  Mil/uL


 


Hgb   10.0 L   (12.0-18.0)  g/dL


 


Hct   31.1 L   (35.0-51.0)  %


 


MCV   99.6 H   (80.0-94.0)  fl


 


MCH   31.9 H   (27.0-31.0)  pg


 


MCHC   32.1 L   (33.0-37.0)  g/dL


 


RDW   13.5   (11.5-14.5)  %


 


Plt Count   437 H   (130-400)  K/uL


 


Sodium   145   (132-148)  mmol/l


 


Potassium   4.0   (3.6-5.0)  MMOL/L


 


Chloride   104   ()  mmol/L


 


Carbon Dioxide   30   (22-30)  mmol/L


 


Anion Gap   15   (10-20)  


 


BUN   20   (9-20)  mg/dl


 


Creatinine   0.8   (0.8-1.5)  mg/dL


 


Est GFR (African Amer)   > 60   


 


Est GFR (Non-Af Amer)   > 60   


 


POC Glucose (mg/dL)  83   103  ()  mg/dL


 


Random Glucose   104   ()  mg/dL


 


Calcium   9.9   (8.4-10.2)  mg/dL


 


Total Bilirubin   0.7   (0.2-1.3)  mg/dl


 


AST   29   (17-59)  U/L


 


ALT   28   (21-72)  U/L


 


Alkaline Phosphatase   154 H   ()  U/L


 


Total Protein   8.4 H   (6.3-8.2)  G/DL


 


Albumin   3.6   (3.5-5.0)  g/dL


 


Globulin   4.8 H   (2.2-3.9)  gm/dL


 


Albumin/Globulin Ratio   0.8 L   (1.0-2.1)  








 Laboratory Results - last 24 hr











  12/31/15 01/01/16 01/01/16





  22:23 05:00 05:41


 


WBC   12.1 H 


 


RBC   3.12 L 


 


Hgb   10.0 L 


 


Hct   31.1 L 


 


MCV   99.6 H 


 


MCH   31.9 H 


 


MCHC   32.1 L 


 


RDW   13.5 


 


Plt Count   437 H 


 


Sodium   145 


 


Potassium   4.0 


 


Chloride   104 


 


Carbon Dioxide   30 


 


Anion Gap   15 


 


BUN   20 


 


Creatinine   0.8 


 


Est GFR ( Amer)   > 60 


 


Est GFR (Non-Af Amer)   > 60 


 


POC Glucose (mg/dL)  103   83


 


Random Glucose   104 


 


Calcium   9.9 


 


Total Bilirubin   0.7 


 


AST   29 


 


ALT   28 


 


Alkaline Phosphatase   154 H 


 


Total Protein   8.4 H 


 


Albumin   3.6 


 


Globulin   4.8 H 


 


Albumin/Globulin Ratio   0.8 L 











Fingerstick Blood Sugar Results: 83





Critical Care Progress Note





- Nutrition


Nutrition: 


 Nutrition











 Category Date Time Status


 


 Dysphagia/Modified Consistency Diet [DIET] Diets  12/31/15 Lunch Active











 


Albumin/Globulin Ratio   0.8 L 











Fingerstick Blood Sugar Results: 83





Critical Care Progress Note





- Nutrition


Nutrition: 


 Nutrition











 Category Date Time Status


 


 Dysphagia/Modified Consistency Diet [DIET] Diets  12/31/15 Lunch Active

## 2016-01-02 RX ADMIN — IPRATROPIUM BROMIDE AND ALBUTEROL SULFATE SCH ML: .5; 3 SOLUTION RESPIRATORY (INHALATION) at 13:15

## 2016-01-02 RX ADMIN — NITROGLYCERIN SCH IN: 20 OINTMENT TOPICAL at 10:00

## 2016-01-02 RX ADMIN — Medication SCH ML: at 08:37

## 2016-01-02 RX ADMIN — IPRATROPIUM BROMIDE AND ALBUTEROL SULFATE SCH ML: .5; 3 SOLUTION RESPIRATORY (INHALATION) at 19:04

## 2016-01-02 RX ADMIN — NITROGLYCERIN SCH IN: 20 OINTMENT TOPICAL at 16:33

## 2016-01-02 RX ADMIN — NITROGLYCERIN SCH IN: 20 OINTMENT TOPICAL at 04:56

## 2016-01-02 RX ADMIN — NITROGLYCERIN SCH IN: 20 OINTMENT TOPICAL at 21:42

## 2016-01-02 RX ADMIN — IPRATROPIUM BROMIDE AND ALBUTEROL SULFATE SCH ML: .5; 3 SOLUTION RESPIRATORY (INHALATION) at 01:12

## 2016-01-02 RX ADMIN — IPRATROPIUM BROMIDE AND ALBUTEROL SULFATE SCH ML: .5; 3 SOLUTION RESPIRATORY (INHALATION) at 07:21

## 2016-01-02 RX ADMIN — ENOXAPARIN SODIUM SCH MG: 40 INJECTION SUBCUTANEOUS at 08:36

## 2016-01-02 NOTE — CP.PCM.PN
Subjective





- Subjective


Subjective: 


pt seen on tele today and is in no distress. no compalitns offered. no f/c, n/v/

d. pt respiratory status remains stable on nasal o2.  tolerating airway and 

secretions.





Review of Systems





- Constitutional


Constitutional: UN





- EENT


Eyes: UNREMARKABLE


Ears: UNREMARKABLE


Nose/Mouth/Throat: UNREMARKABLE





- Cardiovascular


Cardiovascular: UNREMARKABLE





- Respiratory


Respiratory: As Per HPI, UNREMARKABLE





- Gastrointestinal


Gastrointestinal: UNREMARKABLE





- Genitourinary


Genitourinary: UNREMARKABLE





- Reproductive: Male


Reproductive:Male: UNREMARKABLE





- Musculoskeletal


Musculoskeletal: UNREMARKABLE





- Integumentary


Integumentary: UNREMARKABLE





- Neurological


Neurological: UNREMARKABLE





- Psychiatric


Psychiatric: UNREMARKABLE





- Endocrine


Endocrine: UNREMARKABLE





- Hematologic/Lymphatic


Hematologic: UNREMARKABLE





Objective





- Vital Signs/Intake and Output


Vital Signs (last 24 hours): 


 Vital Signs - 24 hr











  01/01/16 01/01/16 01/01/16





  10:00 12:00 16:00


 


Temperature  98.1 F 98.0 F


 


Pulse Rate 83 80 79


 


Respiratory 21 20 20





Rate   


 


Blood Pressure 118/76 130/76 120/79


 


O2 Sat by Pulse 96 99 99





Oximetry   














  01/01/16 01/02/16 01/02/16





  20:00 00:00 04:00


 


Temperature 98.6 F 97.3 F L 97.7 F


 


Pulse Rate 92 H 77 77


 


Respiratory 18 18 18





Rate   


 


Blood Pressure 120/75 144/87 137/86


 


O2 Sat by Pulse 97 100 100





Oximetry   











Intake and Output (last 12 hours): 


 Intake & Output











 01/01/16 01/02/16 01/02/16





 18:59 06:59 18:59


 


Intake Total 1020  


 


Balance 1020  


 


Intake:   


 


  Intake, Piggyback 100  


 


  Oral 920  


 


Other:   


 


  # Bowel Movements 2 1 














- Medications


Medications: 


 Current Medications





Albuterol/Ipratropium (Duoneb 3 Mg/0.5 Mg (3 Ml) Ud)  3 ml INH RQ6 [facility]


   Last Admin: 01/02/16 07:21 Dose:  3 ml


Aspirin (Ecotrin)  81 mg PO DAILY [facility]


   Last Admin: 01/01/16 11:15 Dose:  81 mg


Enalapril Maleate (Vasotec)  10 mg PO DAILY [facility]


   Last Admin: 01/01/16 08:29 Dose:  10 mg


Enoxaparin Sodium (Lovenox)  40 mg SC DAILY [facility]


   Last Admin: 01/01/16 08:29 Dose:  40 mg


Famotidine (Pepcid)  20 mg GT BID [facility]


   Last Admin: 01/01/16 16:00 Dose:  20 mg


Cefepime HCl 2 gm/ Sodium (Chloride)  100 mls @ 100 mls/hr IVPB Q12 [facility]


   Last Admin: 01/01/16 20:46 Dose:  100 mls/hr


Levetiracetam (Keppra)  500 mg PO BID [facility]


   Last Admin: 01/01/16 16:00 Dose:  500 mg


Metoprolol Tartrate (Lopressor)  100 mg PO Q12 [facility]


   Last Admin: 01/01/16 20:47 Dose:  100 mg


Multivitamins/Vitamin C (Multi-Delyn Liquid)  5 ml PO DAILY [facility]


   Last Admin: 01/01/16 08:29 Dose:  5 ml


Nitroglycerin (Nitro-Bid 2% Oint)  2 in TOP Q6 [facility]


   Last Admin: 01/02/16 04:56 Dose:  2 in











- Labs


Labs (last 24 hours): 


 Laboratory Results - last 24 hr











  01/01/16 01/01/16 01/01/16





  11:57 16:15 21:36


 


POC Glucose (mg/dL)  107  94  110














  01/02/16





  05:50


 


POC Glucose (mg/dL)  95














- Constitutional


Appears: Well, Non-toxic, No Acute Distress





- Head Exam


Head Exam: ATRAUMATIC, NORMAL INSPECTION, NORMOCEPHALIC





- Eye Exam


Eye Exam: EOMI





- Respiratory Exam


Respiratory Exam: Clear to PA & Lateral, Rhonchi, NORMAL BREATHING PATTERN


Additional comments: 


diffuse rhonchi. no distress





- Cardiovascular Exam


Cardiovascular Exam: REGULAR RHYTHM, RRR





- GI/Abdominal Exam


GI & Abdominal Exam: Normal Bowel Sounds, Soft, Unremarkable





- Extremities Exam


Extremities exam: full ROM, normal capillary refill, normal inspection, pedal 

pulses present





- Back Exam


Back exam: FULL ROM





- Neurological Exam


Neurological exam: Alert, CN II-XII Intact, Normal Gait, Oriented x3, Reflexes 

Normal





- Psychiatric Exam


Psychiatric exam: Normal Affect, Normal Mood





- Skin


Skin Exam: Dry, Intact, Normal Color, Warm





Assessment/Plan


(1) Cardiac arrest


Current Visit: Yes   Status: Acute


Comment: Anoxic brain injury


Cont. ASA,  Vasotec and Lopressor


no cardio interventions planned at present.


extubated, will still likely need BRITTA


cont guardianship process








(2) DVT prophylaxis


Current Visit: Yes   Status: Acute


Comment: SCDs and sage renew lovenox








(3) Scrotal edema


Current Visit: Yes   Status: Chronic


Comment: scrotal us-noted


urology consult-dr cortes to see pt when stable








(4) History of seizure


Current Visit: Yes   Status: Acute


Comment: Cont. keppra 500 mg BID


Seizure Precatuion








(5) Diabetes mellitus with hyperglycemia


Current Visit: Yes   Status: Chronic


Comment: riss per protocol


much more controlled, will cont ot monitor








(6) Agitation


Current Visit: Yes   Status: Acute


Comment: morphine prn


ativan prn








(7) Aspiration pneumonia


Current Visit: Yes   Status: Acute


Comment: CXR improving-repeat ordered for am


cont IV Cefapime


BD NEBS Q 6H PRN


psudomonas in trach asp


ID f/u appretiated


Daily Vent weaning trials


likely will need trach and LTAC placement 








(8) Acute respiratory failure


Current Visit: Yes   Status: Acute


Comment: cont vent, will need trach


CXR 12/28/15 


Cardiomegaly. 


Improving vascular congestion. 


Slightly improved patchy infiltrate left mid and lower lung field.  Minor mild 

increased density right lung base


small pleural effusion

















- Assessment and Plan (Free Text)


Assessment: 


repeat cxr, b/w incl cbc, procalcitonin in am

## 2016-01-03 LAB
ALBUMIN SERPL-MCNC: 3.4 G/DL (ref 3.5–5)
ALBUMIN/GLOB SERPL: 0.8 {RATIO} (ref 1–2.1)
ALT SERPL-CCNC: 26 U/L (ref 21–72)
AST SERPL-CCNC: 27 U/L (ref 17–59)
BASOPHILS # BLD AUTO: 0 K/UL (ref 0–0.2)
BASOPHILS NFR BLD: 0.5 % (ref 0–2)
BUN SERPL-MCNC: 17 MG/DL (ref 9–20)
CALCIUM SERPL-MCNC: 9.5 MG/DL (ref 8.4–10.2)
EOSINOPHIL # BLD AUTO: 0.7 K/UL (ref 0–0.7)
EOSINOPHIL NFR BLD: 7.4 % (ref 0–4)
ERYTHROCYTE [DISTWIDTH] IN BLOOD BY AUTOMATED COUNT: 13.6 % (ref 11.5–14.5)
GFR NON-AFRICAN AMERICAN: > 60
HGB BLD-MCNC: 10.1 G/DL (ref 12–18)
LYMPHOCYTES # BLD AUTO: 1.7 K/UL (ref 1–4.3)
LYMPHOCYTES NFR BLD AUTO: 18.6 % (ref 20–40)
MCH RBC QN AUTO: 32.2 PG (ref 27–31)
MCHC RBC AUTO-ENTMCNC: 32 G/DL (ref 33–37)
MCV RBC AUTO: 100.4 FL (ref 80–94)
MONOCYTES # BLD: 0.6 K/UL (ref 0–0.8)
MONOCYTES NFR BLD: 6.6 % (ref 0–10)
NEUTROPHILS # BLD: 6.1 K/UL (ref 1.8–7)
NEUTROPHILS NFR BLD AUTO: 66.9 % (ref 50–75)
NRBC BLD AUTO-RTO: 0 % (ref 0–0)
PLATELET # BLD: 406 K/UL (ref 130–400)
PMV BLD AUTO: 7.3 FL (ref 7.2–11.7)
RBC # BLD AUTO: 3.16 MIL/UL (ref 4.4–5.9)
WBC # BLD AUTO: 9.1 K/UL (ref 4.8–10.8)

## 2016-01-03 RX ADMIN — Medication SCH ML: at 08:53

## 2016-01-03 RX ADMIN — IPRATROPIUM BROMIDE AND ALBUTEROL SULFATE SCH: .5; 3 SOLUTION RESPIRATORY (INHALATION) at 19:36

## 2016-01-03 RX ADMIN — NITROGLYCERIN SCH IN: 20 OINTMENT TOPICAL at 21:03

## 2016-01-03 RX ADMIN — IPRATROPIUM BROMIDE AND ALBUTEROL SULFATE SCH ML: .5; 3 SOLUTION RESPIRATORY (INHALATION) at 13:07

## 2016-01-03 RX ADMIN — NITROGLYCERIN SCH IN: 20 OINTMENT TOPICAL at 16:47

## 2016-01-03 RX ADMIN — NITROGLYCERIN SCH IN: 20 OINTMENT TOPICAL at 05:22

## 2016-01-03 RX ADMIN — IPRATROPIUM BROMIDE AND ALBUTEROL SULFATE SCH: .5; 3 SOLUTION RESPIRATORY (INHALATION) at 08:00

## 2016-01-03 RX ADMIN — ENOXAPARIN SODIUM SCH MG: 40 INJECTION SUBCUTANEOUS at 08:53

## 2016-01-03 RX ADMIN — IPRATROPIUM BROMIDE AND ALBUTEROL SULFATE SCH ML: .5; 3 SOLUTION RESPIRATORY (INHALATION) at 01:04

## 2016-01-03 RX ADMIN — NITROGLYCERIN SCH IN: 20 OINTMENT TOPICAL at 10:00

## 2016-01-03 NOTE — RAD
Chest dated 01/03/2016. 



History: Aspiration pneumonia. 



Single AP semi-erect portable view of the chest performed and 

compared with prior study 12/30/2015. 



Interval removal ETT. Heart size unchanged. There appears to be some 

residual opacity in the left mid to lower lung field on.  Minor right 

basilar atelectasis.  Slight elevation right hemidiaphragm. No 

apparent pneumothorax. 



Impression: Interval removal ETT. Mild residual opacity left 

mid-lower lung field and mild right basilar atelectasis

## 2016-01-03 NOTE — CP.PCM.PN
Subjective





- Subjective


Subjective: 


pt stable at present, able to verbalize the he feels better.   no compalints 

offered. b/w noted.  procalcitonin pending. vss. pending guardianship





Review of Systems





- Constitutional


Constitutional: UN





- EENT


Eyes: UNREMARKABLE


Ears: UNREMARKABLE


Nose/Mouth/Throat: UNREMARKABLE





- Cardiovascular


Cardiovascular: UNREMARKABLE





- Respiratory


Respiratory: UNREMARKABLE





- Gastrointestinal


Gastrointestinal: UNREMARKABLE





- Genitourinary


Genitourinary: UNREMARKABLE





- Reproductive: Male


Reproductive:Male: UNREMARKABLE





- Musculoskeletal


Musculoskeletal: UNREMARKABLE





- Integumentary


Integumentary: UNREMARKABLE





- Neurological


Neurological: UNREMARKABLE





- Psychiatric


Psychiatric: UNREMARKABLE





- Endocrine


Endocrine: UNREMARKABLE





- Hematologic/Lymphatic


Hematologic: UNREMARKABLE





Objective





- Vital Signs/Intake and Output


Vital Signs (last 24 hours): 


 Vital Signs - 24 hr











  01/02/16 01/02/16 01/02/16





  10:00 12:00 15:38


 


Temperature  97.8 F 97.9 F


 


Pulse Rate 72 74 75


 


Respiratory  20 18





Rate   


 


Blood Pressure  136/87 130/74


 


O2 Sat by Pulse  97 99





Oximetry   














  01/02/16 01/02/16 01/03/16





  19:56 22:00 00:00


 


Temperature 98.2 F  98.2 F


 


Pulse Rate 90 90 75


 


Respiratory 18  18





Rate   


 


Blood Pressure 146/75  141/83


 


O2 Sat by Pulse 100  98





Oximetry   














  01/03/16 01/03/16





  04:00 07:34


 


Temperature 98.2 F 98.1 F


 


Pulse Rate 80 71


 


Respiratory 18 20





Rate  


 


Blood Pressure 139/76 133/75


 


O2 Sat by Pulse 97 94 L





Oximetry  











Intake and Output (last 12 hours): 


 Intake & Output











 01/02/16 01/03/16 01/03/16





 18:59 06:59 18:59


 


Intake Total 1300 340 


 


Balance 1300 340 


 


Intake:   


 


  Intake, Piggyback 100 100 


 


  Oral 1200 240 


 


Other:   


 


  # Bowel Movements 3  














- Medications


Medications: 


 Current Medications





Albuterol/Ipratropium (Duoneb 3 Mg/0.5 Mg (3 Ml) Ud)  3 ml INH RQ6 [facility]


   Last Admin: 01/03/16 01:04 Dose:  3 ml


Aspirin (Ecotrin)  81 mg PO DAILY [facility]


   Last Admin: 01/02/16 08:35 Dose:  81 mg


Enalapril Maleate (Vasotec)  10 mg PO DAILY [facility]


   Last Admin: 01/02/16 11:00 Dose:  10 mg


Enoxaparin Sodium (Lovenox)  40 mg SC DAILY [facility]


   Last Admin: 01/02/16 08:36 Dose:  40 mg


Famotidine (Pepcid)  20 mg GT BID [facility]


   Last Admin: 01/02/16 18:01 Dose:  20 mg


Cefepime HCl 2 gm/ Sodium (Chloride)  100 mls @ 100 mls/hr IVPB Q12 [facility]


   Last Admin: 01/02/16 21:09 Dose:  100 mls/hr


Levetiracetam (Keppra)  500 mg PO BID [facility]


   Last Admin: 01/02/16 16:33 Dose:  500 mg


Metoprolol Tartrate (Lopressor)  100 mg PO Q12 [facility]


   Last Admin: 01/02/16 21:12 Dose:  100 mg


Multivitamins/Vitamin C (Multi-Delyn Liquid)  5 ml PO DAILY [facility]


   Last Admin: 01/02/16 08:37 Dose:  5 ml


Nitroglycerin (Nitro-Bid 2% Oint)  2 in TOP Q6 [facility]


   Last Admin: 01/03/16 05:22 Dose:  2 in











- Labs


Labs (last 24 hours): 


 Laboratory Results - last 24 hr











  01/02/16 01/03/16 01/03/16





  15:48 04:30 06:17


 


WBC   9.1 


 


RBC   3.16 L 


 


Hgb   10.1 L 


 


Hct   31.7 L 


 


MCV   100.4 H 


 


MCH   32.2 H 


 


MCHC   32.0 L 


 


RDW   13.6 


 


Plt Count   406 H 


 


MPV   7.3 


 


Neut % (Auto)   66.9 


 


Lymph % (Auto)   18.6 L 


 


Mono % (Auto)   6.6 


 


Eos % (Auto)   7.4 H 


 


Baso % (Auto)   0.5 


 


Neut #   6.1 


 


Lymph #   1.7 


 


Mono #   0.6 


 


Eos #   0.7 


 


Baso #   0.0 


 


Sodium   142 


 


Potassium   3.8 


 


Chloride   103 


 


Carbon Dioxide   30 


 


Anion Gap   13 


 


BUN   17 


 


Creatinine   0.8 


 


Est GFR ( Amer)   > 60 


 


Est GFR (Non-Af Amer)   > 60 


 


POC Glucose (mg/dL)  91   92


 


Random Glucose   92 


 


Calcium   9.5 


 


Total Bilirubin   0.5 


 


AST   27 


 


ALT   26 


 


Alkaline Phosphatase   133 H 


 


Total Protein   7.9 


 


Albumin   3.4 L 


 


Globulin   4.5 H 


 


Albumin/Globulin Ratio   0.8 L 














- Constitutional


Appears: Well, Non-toxic, No Acute Distress





- Head Exam


Head Exam: ATRAUMATIC, NORMAL INSPECTION, NORMOCEPHALIC





- Eye Exam


Eye Exam: EOMI





- Respiratory Exam


Respiratory Exam: Clear to PA & Lateral, NORMAL BREATHING PATTERN, UNREMARKABLE





- Cardiovascular Exam


Cardiovascular Exam: REGULAR RHYTHM, Rubs





- GI/Abdominal Exam


GI & Abdominal Exam: Normal Bowel Sounds, Soft, Unremarkable





- Extremities Exam


Extremities exam: full ROM, normal capillary refill, normal inspection, pedal 

pulses present





- Back Exam


Back exam: FULL ROM





- Neurological Exam


Neurological exam: Abnormal Gait, Alert, CN II-XII Intact, Reflexes Normal


Additional comments: 


not able to verablize orientation.  appears as assessed





- Psychiatric Exam


Psychiatric exam: Normal Affect, Normal Mood





- Skin


Skin Exam: Dry, Normal Color, Warm


Additional comments: 


wounds as noted in nursing notes





Assessment/Plan


(1) Cardiac arrest


Current Visit: Yes   Status: Acute


Comment: Anoxic brain injury


Cont. ASA,  Vasotec and Lopressor


no cardio interventions planned at present.


BRITTA


cont guardianship process








(2) DVT prophylaxis


Current Visit: Yes   Status: Acute


Comment: SCDs and sage renew lovenox








(3) Scrotal edema


Current Visit: Yes   Status: Chronic


Comment: scrotal us-noted


urology consult-dr cortes to see pt when stable








(4) History of seizure


Current Visit: Yes   Status: Acute


Comment: Cont. keppra 500 mg BID


Seizure Precatuion








(5) Diabetes mellitus with hyperglycemia


Current Visit: Yes   Status: Chronic


Comment: riss per protocol


much more controlled, will cont ot monitor








(6) Agitation


Current Visit: Yes   Status: Acute


Comment: morphine prn


ativan prn








(7) Aspiration pneumonia


Current Visit: Yes   Status: Acute


Comment: CXR improving-repeat ordered for am


cont IV Cefapime


BD NEBS Q 6H PRN


psudomonas in trach asp


ID f/u appretiated


?? ability to d/c anbx


Daily Vent weaning trials


likely will need trach and LTAC placement 








(8) Acute respiratory failure


Current Visit: Yes   Status: Acute


Comment: penidng cxr results this am-overall lungs improving


CXR 12/28/15 


Cardiomegaly. 


Improving vascular congestion. 


Slightly improved patchy infiltrate left mid and lower lung field.  Minor mild 

increased density right lung base


small pleural effusion

## 2016-01-04 RX ADMIN — NITROGLYCERIN SCH IN: 20 OINTMENT TOPICAL at 22:47

## 2016-01-04 RX ADMIN — NITROGLYCERIN SCH IN: 20 OINTMENT TOPICAL at 17:56

## 2016-01-04 RX ADMIN — NITROGLYCERIN SCH IN: 20 OINTMENT TOPICAL at 10:00

## 2016-01-04 RX ADMIN — IPRATROPIUM BROMIDE AND ALBUTEROL SULFATE SCH ML: .5; 3 SOLUTION RESPIRATORY (INHALATION) at 07:51

## 2016-01-04 RX ADMIN — IPRATROPIUM BROMIDE AND ALBUTEROL SULFATE SCH ML: .5; 3 SOLUTION RESPIRATORY (INHALATION) at 01:00

## 2016-01-04 RX ADMIN — Medication SCH ML: at 08:40

## 2016-01-04 RX ADMIN — ENOXAPARIN SODIUM SCH MG: 40 INJECTION SUBCUTANEOUS at 08:39

## 2016-01-04 RX ADMIN — IPRATROPIUM BROMIDE AND ALBUTEROL SULFATE SCH ML: .5; 3 SOLUTION RESPIRATORY (INHALATION) at 16:00

## 2016-01-04 RX ADMIN — NITROGLYCERIN SCH IN: 20 OINTMENT TOPICAL at 04:44

## 2016-01-04 NOTE — CP.PCM.CON
History of Present Illness





- History of Present Illness


History of Present Illness: 








Reason for Consult


Does pt have mental capacity to make decision to go home?





HPI


55 year old homeless man witnessed having a cardiac arrest. He has been 

combative and wanting to leave the hospital.


I enter the room to find him attempting to get out of bed with staff trying to 

redirect him.  





Staff reports that he has been agitated and reporting that he wants to leave. 

Staff member on one to one was asking him


earlier "if he knows where he is but he must of thought I was asking where he 

was from. He kept answering the [place]


[place]." He does not answer any questions asked with . He makes 

eye contact with us but remains mute.


Despite several attempts to ask about the date, his whereabouts, orientation 

and reason for admission he only stares at 


.








Psychiatric History


Unknown





Substance Use History


Unknown





Family History


Unknown





Social History


Homeless, remaining is unknown





Mental Status Exam


appearance-disheveled, malodorous


behavior-confused, mild agitation


psychomotor activity-increased then suddenly decreased 


impulse control-poor


mood-cannot assess


affect-blunted


speech-mostly mute


thought process-disorganized


thought content-poverty of content





Medical Decision Making


Diagnosis: 


Delirium


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








Plan:


* Pt does not have the capacity to make decision to leave the hospital. In 

order to have capacity he needs to be able


   to communicate his choices, the risks and benefits. He also need to make his 

decision in a logical manner. He is unable


   to fulfill any of these requirements. Remember capacity is not a static 

condition and can change. Please consult if you 


   need further assessment or psychiatry will sign off the case. 











Past Patient History





- Past Medical History & Family History


Past Medical History?: Yes





- Past Social History


Smoking Status: Heavy Smoker > 10 Cigarettes Daily





- CARDIAC


Hx Cardiac Disorders:  (unknown)





- NEUROLOGICAL


Hx Neurological Disorder: Yes (seizure)


Hx Seizures: Yes





- HEENT


Hx HEENT Problems: No





- RENAL


Hx Chronic Kidney Disease: No





- ENDOCRINE/METABOLIC


Hx Endocrine Disorders: No





- HEMATOLOGICAL/ONCOLOGICAL


Hx Blood Disorders: No





- INTEGUMENTARY


Hx Dermatological Problems: No





- MUSCULOSKELETAL/RHEUMATOLOGICAL


Hx Falls: No





- GENITOURINARY/GYNECOLOGICAL


Hx Genitourinary Disorders: No





- PSYCHIATRIC


Hx Substance Use: No





Meds


Allergies/Adverse Reactions: 


 Allergies











Allergy/AdvReac Type Severity Reaction Status Date / Time


 


Unobtainable Allergy   Verified 12/08/15 09:37














- Medications


Medications: 


 Current Medications





Albuterol/Ipratropium (Duoneb 3 Mg/0.5 Mg (3 Ml) Ud)  3 ml INH RQ6 [facility]


   Last Admin: 01/04/16 07:51 Dose:  3 ml


Aspirin (Ecotrin)  81 mg PO DAILY [facility]


   Last Admin: 01/04/16 08:39 Dose:  81 mg


Enalapril Maleate (Vasotec)  10 mg PO DAILY [facility]


   Last Admin: 01/04/16 08:41 Dose:  10 mg


Enoxaparin Sodium (Lovenox)  40 mg SC DAILY [facility]


   Last Admin: 01/04/16 08:39 Dose:  40 mg


Famotidine (Pepcid)  20 mg GT BID [facility]


   Last Admin: 01/04/16 08:39 Dose:  20 mg


Cefepime HCl 2 gm/ Sodium (Chloride)  100 mls @ 100 mls/hr IVPB Q12 [facility]


   Last Admin: 01/04/16 08:40 Dose:  100 mls/hr


Levetiracetam (Keppra)  500 mg PO BID [facility]


   Last Admin: 01/04/16 08:39 Dose:  500 mg


Lorazepam (Ativan)  1 mg IVP Q4 PRN


   PRN Reason: Agitation


   Last Admin: 01/04/16 09:50 Dose:  1 mg


Metoprolol Tartrate (Lopressor)  100 mg PO Q12 [facility]


   Last Admin: 01/04/16 08:39 Dose:  100 mg


Multivitamins/Vitamin C (Multi-Delyn Liquid)  5 ml PO DAILY [facility]


   Last Admin: 01/04/16 08:40 Dose:  5 ml


Nitroglycerin (Nitro-Bid 2% Oint)  2 in TOP Q6 [facility]


   Last Admin: 01/04/16 04:44 Dose:  2 in











Results





- Vital Signs


Recent Vital Signs: 


 Last Vital Signs











Temp  98.2 F   01/04/16 07:51


 


Pulse  90   01/04/16 07:51


 


Resp  20   01/04/16 07:51


 


BP  113/76   01/04/16 07:51


 


Pulse Ox  98   01/04/16 07:51














- Labs


Result Diagrams: 


 01/03/16 04:30





 01/03/16 04:30


Labs: 


 Laboratory Results - last 24 hr











  01/03/16 01/03/16 01/04/16





  04:30 15:55 06:07


 


POC Glucose (mg/dL)   90  76


 


Procalcitonin  < [id number] L

## 2016-01-04 NOTE — CP.PCM.PN
Subjective





- Subjective


Subjective: 


iv rx in progress





Review of Systems





- Review of Systems


Systems not reviewed;Unavailable: Altered Mental Status


All systems: reviewed and no additional remarkable complaints except





- Constitutional


Constitutional: absent: Fever





Objective





- Vital Signs/Intake and Output


Vital Signs (last 24 hours): 


 Vital Signs - 24 hr











  01/03/16 01/03/16 01/03/16





  16:03 20:00 22:00


 


Temperature 97.4 F L 97.9 F 


 


Pulse Rate 85 104 H 104 H


 


Respiratory 20 18 





Rate   


 


Blood Pressure 114/72 157/98 H 


 


O2 Sat by Pulse 100 99 





Oximetry   














  01/04/16 01/04/16





  04:00 07:51


 


Temperature 98.2 F 98.2 F


 


Pulse Rate 73 90


 


Respiratory 18 20





Rate  


 


Blood Pressure 141/87 113/76


 


O2 Sat by Pulse 99 98





Oximetry  











Intake and Output (last 12 hours): 


 Intake & Output











 01/03/16 01/04/16 01/04/16





 18:59 06:59 18:59


 


Intake Total 2500 580 


 


Balance 2500 580 


 


Intake:   


 


  Intake, Piggyback 100 100 


 


  Oral 2400 480 


 


Other:   


 


  # Bowel Movements 2  














- Medications


Medications: 


 Current Medications





Albuterol/Ipratropium (Duoneb 3 Mg/0.5 Mg (3 Ml) Ud)  3 ml INH RQ6 [facility]


   Last Admin: 01/04/16 07:51 Dose:  3 ml


Aspirin (Ecotrin)  81 mg PO DAILY [facility]


   Last Admin: 01/04/16 08:39 Dose:  81 mg


Enalapril Maleate (Vasotec)  10 mg PO DAILY [facility]


   Last Admin: 01/04/16 08:41 Dose:  10 mg


Enoxaparin Sodium (Lovenox)  40 mg SC DAILY [facility]


   Last Admin: 01/04/16 08:39 Dose:  40 mg


Famotidine (Pepcid)  20 mg GT BID [facility]


   Last Admin: 01/04/16 08:39 Dose:  20 mg


Cefepime HCl 2 gm/ Sodium (Chloride)  100 mls @ 100 mls/hr IVPB Q12 [facility]


   Last Admin: 01/04/16 08:40 Dose:  100 mls/hr


Levetiracetam (Keppra)  500 mg PO BID [facility]


   Last Admin: 01/04/16 08:39 Dose:  500 mg


Lorazepam (Ativan)  1 mg IVP Q4 PRN


   PRN Reason: Agitation


   Last Admin: 01/04/16 09:50 Dose:  1 mg


Metoprolol Tartrate (Lopressor)  100 mg PO Q12 [facility]


   Last Admin: 01/04/16 08:39 Dose:  100 mg


Multivitamins/Vitamin C (Multi-Delyn Liquid)  5 ml PO DAILY [facility]


   Last Admin: 01/04/16 08:40 Dose:  5 ml


Nitroglycerin (Nitro-Bid 2% Oint)  2 in TOP Q6 [facility]


   Last Admin: 01/04/16 04:44 Dose:  2 in











- Labs


Labs (last 24 hours): 


 Laboratory Results - last 24 hr











  01/03/16 01/03/16 01/04/16





  04:30 15:55 06:07


 


POC Glucose (mg/dL)   90  76


 


Procalcitonin  < [id number] L  














- Constitutional


Appears: Non-toxic, Chronically Ill





- Head Exam


Head Exam: NORMOCEPHALIC





- Eye Exam


Eye Exam: absent: Scleral icterus





- Neck Exam


Neck exam: absent: Lymphadenopathy, Thyromegaly





- Respiratory Exam


Respiratory Exam: Decreased Breath Sounds, Rhonchi





- Cardiovascular Exam


Cardiovascular Exam: REGULAR RHYTHM, +S1, +S2





- GI/Abdominal Exam


GI & Abdominal Exam: Normal Bowel Sounds, Soft.  absent: Tenderness





- Rectal Exam


Rectal Exam: Deferred





-  Exam


 Exam: NORMAL INSPECTION





- Extremities Exam


Extremities exam: pedal pulses present.  absent: pedal edema, tenderness





- Back Exam


Back exam: absent: CVA tenderness (L), CVA tenderness (R)





- Neurological Exam


Neurological exam: Alert, Altered, CN II-XII Intact





Assessment/Plan


(1) Agitation


Current Visit: Yes   Status: Acute


Comment: morphine prn


ativan prn








(2) Aspiration pneumonia


Current Visit: Yes   Status: Acute


Comment: CXR improving-repeat ordered for am


cont IV Cefapime


BD NEBS Q 6H PRN


psudomonas in trach asp


ID f/u appretiated


?? ability to d/c anbx


Daily Vent weaning trials


likely will need trach and LTAC placement 








(3) Cardiac arrest


Current Visit: Yes   Status: Acute


Comment: Anoxic brain injury


Cont. ASA,  Vasotec and Lopressor


no cardio interventions planned at present.


BRITTA


cont guardianship process








(4) Diabetes mellitus with hyperglycemia


Current Visit: Yes   Status: Chronic


Comment: riss per protocol


much more controlled, will cont ot monitor








(5) History of seizure


Current Visit: Yes   Status: Acute


Comment: Cont. keppra 500 mg BID


Seizure Precatuion








(6) NSTEMI (non-ST elevated myocardial infarction)


Current Visit: Yes   Status: Acute


Comment: 


Cont. ASA,  Vasotec and Lopressor

## 2016-01-04 NOTE — CP.PCM.PN
Subjective





- Subjective


Subjective: 


pt comfortable in bed.  pt woke up and tried to leave hospital last night.  was 

restrained for safety and given haldol and ativan.  1:1 at bedside. 


pt calm at present.  no f/c, n/v/d. 





Review of Systems





- Review of Systems


Systems not reviewed;Unavailable: Altered Mental Status





- Constitutional


Constitutional: UN





- EENT


Eyes: UNREMARKABLE


Ears: UNREMARKABLE


Nose/Mouth/Throat: UNREMARKABLE





- Cardiovascular


Cardiovascular: UNREMARKABLE





- Respiratory


Respiratory: UNREMARKABLE





- Gastrointestinal


Gastrointestinal: UNREMARKABLE





- Genitourinary


Genitourinary: UNREMARKABLE





- Reproductive: Male


Reproductive:Male: UNREMARKABLE





- Musculoskeletal


Musculoskeletal: UNREMARKABLE





- Integumentary


Integumentary: UNREMARKABLE





- Neurological


Neurological: UNREMARKABLE





- Psychiatric


Psychiatric: UNREMARKABLE





- Endocrine


Endocrine: UNREMARKABLE





- Hematologic/Lymphatic


Hematologic: UNREMARKABLE





Objective





- Vital Signs/Intake and Output


Vital Signs (last 24 hours): 


 Vital Signs - 24 hr











  01/03/16 01/03/16 01/03/16





  10:00 11:53 16:03


 


Temperature  98.1 F 97.4 F L


 


Pulse Rate 71 63 85


 


Respiratory  20 20





Rate   


 


Blood Pressure  148/93 H 114/72


 


O2 Sat by Pulse  100 100





Oximetry   














  01/03/16 01/03/16 01/04/16





  20:00 22:00 04:00


 


Temperature 97.9 F  98.2 F


 


Pulse Rate 104 H 104 H 73


 


Respiratory 18  18





Rate   


 


Blood Pressure 157/98 H  141/87


 


O2 Sat by Pulse 99  99





Oximetry   














  01/04/16





  07:51


 


Temperature 98.2 F


 


Pulse Rate 90


 


Respiratory 20





Rate 


 


Blood Pressure 113/76


 


O2 Sat by Pulse 98





Oximetry 











Intake and Output (last 12 hours): 


 Intake & Output











 01/03/16 01/04/16 01/04/16





 18:59 06:59 18:59


 


Intake Total 2500 580 


 


Balance 2500 580 


 


Intake:   


 


  Intake, Piggyback 100 100 


 


  Oral 2400 480 


 


Other:   


 


  # Bowel Movements 2  














- Medications


Medications: 


 Current Medications





Albuterol/Ipratropium (Duoneb 3 Mg/0.5 Mg (3 Ml) Ud)  3 ml INH RQ6 [facility]


   Last Admin: 01/04/16 07:51 Dose:  3 ml


Aspirin (Ecotrin)  81 mg PO DAILY [facility]


   Last Admin: 01/03/16 08:52 Dose:  81 mg


Enalapril Maleate (Vasotec)  10 mg PO DAILY [facility]


   Last Admin: 01/03/16 08:53 Dose:  10 mg


Enoxaparin Sodium (Lovenox)  40 mg SC DAILY [facility]


Famotidine (Pepcid)  20 mg GT BID [facility]


   Last Admin: 01/03/16 16:47 Dose:  20 mg


Cefepime HCl 2 gm/ Sodium (Chloride)  100 mls @ 100 mls/hr IVPB Q12 [facility]


   Last Admin: 01/03/16 20:52 Dose:  100 mls/hr


Levetiracetam (Keppra)  500 mg PO BID [facility]


   Last Admin: 01/03/16 16:47 Dose:  500 mg


Lorazepam (Ativan)  1 mg IVP Q4 PRN


   PRN Reason: Agitation


Metoprolol Tartrate (Lopressor)  100 mg PO Q12 [facility]


   Last Admin: 01/03/16 20:53 Dose:  100 mg


Multivitamins/Vitamin C (Multi-Delyn Liquid)  5 ml PO DAILY [facility]


   Last Admin: 01/03/16 08:53 Dose:  5 ml


Nitroglycerin (Nitro-Bid 2% Oint)  2 in TOP Q6 [facility]


   Last Admin: 01/04/16 04:44 Dose:  2 in











- Labs


Labs (last 24 hours): 


 Laboratory Results - last 24 hr











  01/03/16 01/03/16 01/04/16





  04:30 15:55 06:07


 


POC Glucose (mg/dL)   90  76


 


Procalcitonin  < [id number] L  














- Constitutional


Appears: Non-toxic, No Acute Distress, Chronically Ill





- Head Exam


Head Exam: ATRAUMATIC, NORMAL INSPECTION, NORMOCEPHALIC





- Eye Exam


Eye Exam: EOMI





- Respiratory Exam


Respiratory Exam: Clear to PA & Lateral, NORMAL BREATHING PATTERN, UNREMARKABLE





- Cardiovascular Exam


Cardiovascular Exam: REGULAR RHYTHM, RRR





- GI/Abdominal Exam


GI & Abdominal Exam: Normal Bowel Sounds, Soft, Unremarkable





- Extremities Exam


Extremities exam: full ROM, normal capillary refill, normal inspection, pedal 

pulses present





- Back Exam


Back exam: FULL ROM





- Neurological Exam


Neurological exam: Alert, CN II-XII Intact, Normal Gait, Reflexes Normal


Additional comments: 


able to state year, no insight into medical condition





- Psychiatric Exam


Psychiatric exam: Flat Affect





- Skin


Skin Exam: Dry, Intact, Normal Color, Warm





Assessment/Plan


(1) Cardiac arrest


Current Visit: Yes   Status: Acute


Comment: Anoxic brain injury


Cont. ASA,  Vasotec and Lopressor


no cardio interventions planned at present.


BRITTA


cont guardianship process








(2) DVT prophylaxis


Current Visit: Yes   Status: Acute


Comment: SCDs and sage renew lovenox








(3) Scrotal edema


Current Visit: Yes   Status: Chronic


Comment: scrotal us-noted


urology consult-dr cortes to see pt when stable








(4) History of seizure


Current Visit: Yes   Status: Chronic


Comment: Cont. keppra 500 mg BID


Seizure Precatuion








(5) Diabetes mellitus with hyperglycemia


Current Visit: Yes   Status: Chronic


Comment: riss per protocol


much more controlled, will cont ot monitor








(6) Agitation


Current Visit: Yes   Status: Acute


Comment: 1:1


restraints


ativan prn








(7) Aspiration pneumonia


Current Visit: Yes   Status: Acute


Comment: CXR improving-repeat ordered for am


cont IV Cefapime


BD NEBS Q 6H PRN


psudomonas in trach asp


ID f/u appretiated


?? ability to d/c anbx


cxr improving








(8) Acute respiratory failure


Current Visit: Yes   Status: Acute


Comment: penidng cxr results this am-overall lungs improving


CXR 12/28/15 


Cardiomegaly. 


Improving vascular congestion. 


Slightly improved patchy infiltrate left mid and lower lung field.  Minor mild 

increased density right lung base


small pleural effusion

















- Assessment and Plan (Free Text)


Assessment: 


incompentency noted by psych consult, orger trevino aware

## 2016-01-05 LAB
ALBUMIN SERPL-MCNC: 3.6 G/DL (ref 3.5–5)
ALBUMIN/GLOB SERPL: 0.8 {RATIO} (ref 1–2.1)
ALT SERPL-CCNC: 24 U/L (ref 21–72)
AST SERPL-CCNC: 25 U/L (ref 17–59)
BASOPHILS # BLD AUTO: 0 K/UL (ref 0–0.2)
BASOPHILS NFR BLD: 0.4 % (ref 0–2)
BUN SERPL-MCNC: 13 MG/DL (ref 9–20)
CALCIUM SERPL-MCNC: 9.8 MG/DL (ref 8.4–10.2)
EOSINOPHIL # BLD AUTO: 0.7 K/UL (ref 0–0.7)
EOSINOPHIL NFR BLD: 7.1 % (ref 0–4)
ERYTHROCYTE [DISTWIDTH] IN BLOOD BY AUTOMATED COUNT: 13.4 % (ref 11.5–14.5)
GFR NON-AFRICAN AMERICAN: > 60
HGB BLD-MCNC: 10.7 G/DL (ref 12–18)
LYMPHOCYTES # BLD AUTO: 1.8 K/UL (ref 1–4.3)
LYMPHOCYTES NFR BLD AUTO: 18.3 % (ref 20–40)
MCH RBC QN AUTO: 32.4 PG (ref 27–31)
MCHC RBC AUTO-ENTMCNC: 33 G/DL (ref 33–37)
MCV RBC AUTO: 98 FL (ref 80–94)
MONOCYTES # BLD: 0.6 K/UL (ref 0–0.8)
MONOCYTES NFR BLD: 5.9 % (ref 0–10)
NEUTROPHILS # BLD: 6.8 K/UL (ref 1.8–7)
NEUTROPHILS NFR BLD AUTO: 68.3 % (ref 50–75)
NRBC BLD AUTO-RTO: 0 % (ref 0–0)
PLATELET # BLD: 405 K/UL (ref 130–400)
PMV BLD AUTO: 7.4 FL (ref 7.2–11.7)
RBC # BLD AUTO: 3.29 MIL/UL (ref 4.4–5.9)
WBC # BLD AUTO: 10 K/UL (ref 4.8–10.8)

## 2016-01-05 RX ADMIN — NITROGLYCERIN SCH IN: 20 OINTMENT TOPICAL at 17:04

## 2016-01-05 RX ADMIN — NITROGLYCERIN SCH IN: 20 OINTMENT TOPICAL at 21:52

## 2016-01-05 RX ADMIN — IPRATROPIUM BROMIDE AND ALBUTEROL SULFATE SCH ML: .5; 3 SOLUTION RESPIRATORY (INHALATION) at 01:00

## 2016-01-05 RX ADMIN — NITROGLYCERIN SCH IN: 20 OINTMENT TOPICAL at 05:25

## 2016-01-05 RX ADMIN — Medication SCH ML: at 09:33

## 2016-01-05 RX ADMIN — ENOXAPARIN SODIUM SCH MG: 40 INJECTION SUBCUTANEOUS at 09:32

## 2016-01-05 RX ADMIN — NITROGLYCERIN SCH IN: 20 OINTMENT TOPICAL at 09:34

## 2016-01-05 NOTE — CP.PCM.PN
Subjective





- Subjective


Subjective: 


pt comfortably sleeping in bed w/ restraints in place, 1:1 at bedside.  

restraints to be d/c this am.  no agitation noted. vss, b/w noted. pending 

[facility]





Review of Systems





- Review of Systems


Systems not reviewed;Unavailable: Altered Mental Status





- Constitutional


Constitutional: UN





- EENT


Eyes: UNREMARKABLE


Ears: UNREMARKABLE


Nose/Mouth/Throat: UNREMARKABLE





- Cardiovascular


Cardiovascular: UNREMARKABLE





- Respiratory


Respiratory: UNREMARKABLE





- Gastrointestinal


Gastrointestinal: UNREMARKABLE





- Genitourinary


Genitourinary: UNREMARKABLE





- Reproductive: Male


Reproductive:Male: UNREMARKABLE





- Musculoskeletal


Musculoskeletal: UNREMARKABLE





- Integumentary


Integumentary: UNREMARKABLE





- Neurological


Neurological: UNREMARKABLE





- Psychiatric


Psychiatric: UNREMARKABLE





- Endocrine


Endocrine: UNREMARKABLE





- Hematologic/Lymphatic


Hematologic: UNREMARKABLE





Objective





- Vital Signs/Intake and Output


Vital Signs (last 24 hours): 


 Vital Signs - 24 hr











  01/04/16 01/04/16 01/05/16





  12:00 16:21 00:09


 


Temperature 98.1 F 97.0 F L 98.1 F


 


Pulse Rate 64 79 80


 


Respiratory 20 20 20





Rate   


 


Blood Pressure 124/77 130/79 148/93 H


 


O2 Sat by Pulse 98 96 99





Oximetry   














  01/05/16





  07:39


 


Temperature 97.3 F L


 


Pulse Rate 73


 


Respiratory 20





Rate 


 


Blood Pressure 134/77


 


O2 Sat by Pulse 96





Oximetry 














- Medications


Medications: 


 Current Medications





Aspirin (Ecotrin)  81 mg PO DAILY [facility]


   Last Admin: 01/04/16 08:39 Dose:  81 mg


Enalapril Maleate (Vasotec)  10 mg PO DAILY [facility]


   Last Admin: 01/04/16 08:41 Dose:  10 mg


Enoxaparin Sodium (Lovenox)  40 mg SC DAILY [facility]


   Last Admin: 01/04/16 08:39 Dose:  40 mg


Famotidine (Pepcid)  20 mg GT BID [facility]


   Last Admin: 01/04/16 17:30 Dose:  20 mg


Cefepime HCl 2 gm/ Sodium (Chloride)  100 mls @ 100 mls/hr IVPB Q12 [facility]


   Last Admin: 01/04/16 21:45 Dose:  100 mls/hr


Levetiracetam (Keppra)  500 mg PO BID [facility]


   Last Admin: 01/04/16 17:30 Dose:  500 mg


Lorazepam (Ativan)  1 mg IVP Q4 PRN


   PRN Reason: Agitation


   Last Admin: 01/04/16 15:23 Dose:  1 mg


Metoprolol Tartrate (Lopressor)  100 mg PO Q12 [facility]


   Last Admin: 01/04/16 21:46 Dose:  100 mg


Multivitamins/Vitamin C (Multi-Delyn Liquid)  5 ml PO DAILY [facility]


   Last Admin: 01/04/16 08:40 Dose:  5 ml


Nitroglycerin (Nitro-Bid 2% Oint)  2 in TOP Q6 [facility]


   Last Admin: 01/05/16 05:25 Dose:  2 in











- Labs


Labs (last 24 hours): 


 Laboratory Results - last 24 hr











  01/04/16 01/05/16 01/05/16





  17:28 04:30 05:48


 


WBC   10.0 


 


RBC   3.29 L 


 


Hgb   10.7 L 


 


Hct   32.3 L 


 


MCV   98.0 H D 


 


MCH   32.4 H 


 


MCHC   33.0 


 


RDW   13.4 


 


Plt Count   405 H 


 


MPV   7.4 


 


Neut % (Auto)   68.3 


 


Lymph % (Auto)   18.3 L 


 


Mono % (Auto)   5.9 


 


Eos % (Auto)   7.1 H 


 


Baso % (Auto)   0.4 


 


Neut #   6.8 


 


Lymph #   1.8 


 


Mono #   0.6 


 


Eos #   0.7 


 


Baso #   0.0 


 


Sodium   142 


 


Potassium   3.9 


 


Chloride   101 


 


Carbon Dioxide   31 H 


 


Anion Gap   14 


 


BUN   13 


 


Creatinine   0.8 


 


Est GFR ( Amer)   > 60 


 


Est GFR (Non-Af Amer)   > 60 


 


POC Glucose (mg/dL)  83   87


 


Random Glucose   91 


 


Calcium   9.8 


 


Total Bilirubin   0.5 


 


AST   25 


 


ALT   24 


 


Alkaline Phosphatase   144 H 


 


Total Protein   8.2 


 


Albumin   3.6 


 


Globulin   4.6 H 


 


Albumin/Globulin Ratio   0.8 L 














- Constitutional


Appears: Well, Non-toxic, No Acute Distress





- Head Exam


Head Exam: ATRAUMATIC, NORMAL INSPECTION, NORMOCEPHALIC





- Eye Exam


Eye Exam: EOMI





- Respiratory Exam


Respiratory Exam: Clear to PA & Lateral, NORMAL BREATHING PATTERN, UNREMARKABLE





- Cardiovascular Exam


Cardiovascular Exam: REGULAR RHYTHM, RRR





- GI/Abdominal Exam


GI & Abdominal Exam: Normal Bowel Sounds, Soft, Unremarkable





- Neurological Exam


Neurological exam: Alert, Altered, Reflexes Normal


Additional comments: 


able to state year, verbalizes he feels better





- Psychiatric Exam


Psychiatric exam: Normal Affect, Normal Mood





- Skin


Skin Exam: Dry, Normal Color, Warm





Assessment/Plan


(1) Cardiac arrest


Current Visit: Yes   Status: Acute


Comment: Anoxic brain injury


Cont. ASA,  Vasotec and Lopressor


no cardio interventions planned at present.


BRITTA


cont guardianship process








(2) DVT prophylaxis


Current Visit: Yes   Status: Acute


Comment: SCDs and sage renew lovenox








(3) Scrotal edema


Current Visit: Yes   Status: Chronic


Comment: scrotal us-noted


urology consult-dr cortes to see pt when stable








(4) History of seizure


Current Visit: Yes   Status: Chronic


Comment: Cont. keppra 500 mg BID


Seizure Precatuion








(5) Diabetes mellitus with hyperglycemia


Current Visit: Yes   Status: Chronic


Comment: riss per protocol


much more controlled, will cont ot monitor








(6) Agitation


Current Visit: Yes   Status: Acute


Comment: 1:1


restraints to be d/c today


ativan prn








(7) Aspiration pneumonia


Current Visit: Yes   Status: Acute


Comment: CXR improving-repeat ordered for am


cont IV Cefapime


BD NEBS Q 6H PRN


psudomonas in trach asp


ID f/u appretiated


?? ability to d/c anbx


cxr improving








(8) Acute respiratory failure


Current Visit: Yes   Status: Acute


Comment: penidng cxr results this am-overall lungs improving


CXR 12/28/15 


Cardiomegaly. 


Improving vascular congestion. 


Slightly improved patchy infiltrate left mid and lower lung field.  Minor mild 

increased density right lung base


small pleural effusion

## 2016-01-06 LAB
ALBUMIN SERPL-MCNC: 4 G/DL (ref 3.5–5)
ALBUMIN/GLOB SERPL: 0.9 {RATIO} (ref 1–2.1)
ALT SERPL-CCNC: 29 U/L (ref 21–72)
AST SERPL-CCNC: 29 U/L (ref 17–59)
BASOPHILS # BLD AUTO: 0.1 K/UL (ref 0–0.2)
BASOPHILS NFR BLD: 0.4 % (ref 0–2)
BUN SERPL-MCNC: 14 MG/DL (ref 9–20)
CALCIUM SERPL-MCNC: 9.8 MG/DL (ref 8.4–10.2)
EOSINOPHIL # BLD AUTO: 0.7 K/UL (ref 0–0.7)
EOSINOPHIL NFR BLD: 6.1 % (ref 0–4)
ERYTHROCYTE [DISTWIDTH] IN BLOOD BY AUTOMATED COUNT: 13.2 % (ref 11.5–14.5)
GFR NON-AFRICAN AMERICAN: > 60
HGB BLD-MCNC: 10.4 G/DL (ref 12–18)
LYMPHOCYTES # BLD AUTO: 2.1 K/UL (ref 1–4.3)
LYMPHOCYTES NFR BLD AUTO: 16.8 % (ref 20–40)
MCH RBC QN AUTO: 32.1 PG (ref 27–31)
MCHC RBC AUTO-ENTMCNC: 32.5 G/DL (ref 33–37)
MCV RBC AUTO: 98.9 FL (ref 80–94)
MONOCYTES # BLD: 0.9 K/UL (ref 0–0.8)
MONOCYTES NFR BLD: 7.4 % (ref 0–10)
NEUTROPHILS # BLD: 8.5 K/UL (ref 1.8–7)
NEUTROPHILS NFR BLD AUTO: 69.3 % (ref 50–75)
NRBC BLD AUTO-RTO: 0 % (ref 0–0)
PLATELET # BLD: 383 K/UL (ref 130–400)
PMV BLD AUTO: 7.5 FL (ref 7.2–11.7)
RBC # BLD AUTO: 3.25 MIL/UL (ref 4.4–5.9)
WBC # BLD AUTO: 12.2 K/UL (ref 4.8–10.8)

## 2016-01-06 RX ADMIN — NITROGLYCERIN SCH IN: 20 OINTMENT TOPICAL at 17:54

## 2016-01-06 RX ADMIN — NITROGLYCERIN SCH IN: 20 OINTMENT TOPICAL at 04:17

## 2016-01-06 RX ADMIN — Medication SCH ML: at 10:38

## 2016-01-06 RX ADMIN — ENOXAPARIN SODIUM SCH MG: 40 INJECTION SUBCUTANEOUS at 10:42

## 2016-01-06 RX ADMIN — NITROGLYCERIN SCH IN: 20 OINTMENT TOPICAL at 21:19

## 2016-01-06 RX ADMIN — NITROGLYCERIN SCH IN: 20 OINTMENT TOPICAL at 10:37

## 2016-01-06 NOTE — CP.PCM.PN
Subjective





- Subjective


Subjective: 





Staff reports that he became extremely agitated last night and 5 persons were 

needed to redirect him.


He remains confused but is a little more responsive and can follow some 

commands.





I enter room to find Mr. Carpenter seated up in bed. He glances at me then returns 

to picking 


at his shirt and the peeling dead skin on the palm of his hands. He is quite 

absorbed in


this action and does not answer any questions. He does say "marybeth" when asked 

how he is doing.


He shakes his head to confirm how well he is doing as he continues to pick. No 

further eye


contact is made during this activity.





Mental Status Exam


appearance-disheveled- but given a bath today


behavior-bizarre


psychomotor activity-decreased


impulse control-limited


mood-"marybeth"


affect-blunted, inappropriate


speech-under-productive, soft, non spontaneous


thought process- slowed, distracted


thought content-poverty of content, internal preoccupation





Medical Decision Making


Assessment: Delirium


suspect Neuro-cognitive Impairment due to medical condition


Plan


-continue Ativan prn


-can also add Haldol 2 mg po q 4-6 hrs to add to Ativan- assists with agitation 

in delirium





HAVE A WONDERFUL DAY.











Objective





- Vital Signs/Intake and Output


Vital Signs (last 24 hours): 


 Vital Signs - 24 hr











  01/05/16 01/05/16 01/06/16





  17:09 21:46 00:00


 


Temperature   98.1 F


 


Pulse Rate 107 H 93 H 94 H


 


Respiratory   19





Rate   


 


Blood Pressure 148/93 H 151/93 H 157/69 H


 


O2 Sat by Pulse 100  97





Oximetry   














  01/06/16





  08:13


 


Temperature 97.5 F L


 


Pulse Rate 84


 


Respiratory 20





Rate 


 


Blood Pressure 141/82


 


O2 Sat by Pulse 99





Oximetry 














- Medications


Medications: 


 Current Medications





Aspirin (Ecotrin)  81 mg PO DAILY [facility]


   Last Admin: 01/05/16 09:32 Dose:  81 mg


Enalapril Maleate (Vasotec)  10 mg PO DAILY [facility]


   Last Admin: 01/05/16 09:33 Dose:  10 mg


Enoxaparin Sodium (Lovenox)  40 mg SC DAILY [facility]


   Last Admin: 01/05/16 09:32 Dose:  40 mg


Famotidine (Pepcid)  20 mg GT BID [facility]


   Last Admin: 01/05/16 17:06 Dose:  20 mg


Cefepime HCl 2 gm/ Sodium (Chloride)  100 mls @ 100 mls/hr IVPB Q12 [facility]


   Last Admin: 01/05/16 21:50 Dose:  100 mls/hr


Levetiracetam (Keppra)  500 mg PO BID [facility]


   Last Admin: 01/05/16 17:05 Dose:  500 mg


Lorazepam (Ativan)  1 mg IVP Q4 PRN


   PRN Reason: Agitation


   Last Admin: 01/05/16 22:00 Dose:  1 mg


Metoprolol Tartrate (Lopressor)  100 mg PO Q12 [facility]


   Last Admin: 01/05/16 21:46 Dose:  100 mg


Multivitamins/Vitamin C (Multi-Delyn Liquid)  5 ml PO DAILY [facility]


   Last Admin: 01/05/16 09:33 Dose:  5 ml


Nitroglycerin (Nitro-Bid 2% Oint)  2 in TOP Q6 [facility]


   Last Admin: 01/06/16 04:17 Dose:  2 in











- Labs


Labs (last 24 hours): 


 Laboratory Results - last 24 hr











  01/05/16 01/05/16 01/05/16





  11:13 16:38 21:42


 


WBC   


 


RBC   


 


Hgb   


 


Hct   


 


MCV   


 


MCH   


 


MCHC   


 


RDW   


 


Plt Count   


 


MPV   


 


Neut % (Auto)   


 


Lymph % (Auto)   


 


Mono % (Auto)   


 


Eos % (Auto)   


 


Baso % (Auto)   


 


Neut #   


 


Lymph #   


 


Mono #   


 


Eos #   


 


Baso #   


 


Sodium   


 


Potassium   


 


Chloride   


 


Carbon Dioxide   


 


Anion Gap   


 


BUN   


 


Creatinine   


 


Est GFR ( Amer)   


 


Est GFR (Non-Af Amer)   


 


POC Glucose (mg/dL)  102  88  97


 


Random Glucose   


 


Calcium   


 


Total Bilirubin   


 


AST   


 


ALT   


 


Alkaline Phosphatase   


 


Total Protein   


 


Albumin   


 


Globulin   


 


Albumin/Globulin Ratio   














  01/06/16





  04:30


 


WBC  12.2 H


 


RBC  3.25 L


 


Hgb  10.4 L


 


Hct  32.1 L


 


MCV  98.9 H


 


MCH  32.1 H


 


MCHC  32.5 L


 


RDW  13.2


 


Plt Count  383


 


MPV  7.5


 


Neut % (Auto)  69.3


 


Lymph % (Auto)  16.8 L


 


Mono % (Auto)  7.4


 


Eos % (Auto)  6.1 H


 


Baso % (Auto)  0.4


 


Neut #  8.5 H


 


Lymph #  2.1


 


Mono #  0.9 H


 


Eos #  0.7


 


Baso #  0.1


 


Sodium  140


 


Potassium  4.0


 


Chloride  100


 


Carbon Dioxide  29


 


Anion Gap  16


 


BUN  14


 


Creatinine  1.0


 


Est GFR ( Amer)  > 60


 


Est GFR (Non-Af Amer)  > 60


 


POC Glucose (mg/dL) 


 


Random Glucose  95


 


Calcium  9.8


 


Total Bilirubin  0.7


 


AST  29


 


ALT  29


 


Alkaline Phosphatase  153 H


 


Total Protein  8.6 H


 


Albumin  4.0


 


Globulin  4.6 H


 


Albumin/Globulin Ratio  0.9 L

## 2016-01-07 RX ADMIN — NITROGLYCERIN SCH IN: 20 OINTMENT TOPICAL at 05:00

## 2016-01-07 RX ADMIN — NITROGLYCERIN SCH IN: 20 OINTMENT TOPICAL at 16:49

## 2016-01-07 RX ADMIN — NITROGLYCERIN SCH IN: 20 OINTMENT TOPICAL at 09:48

## 2016-01-07 RX ADMIN — NITROGLYCERIN SCH IN: 20 OINTMENT TOPICAL at 22:30

## 2016-01-07 RX ADMIN — ENOXAPARIN SODIUM SCH MG: 40 INJECTION SUBCUTANEOUS at 09:48

## 2016-01-07 RX ADMIN — Medication SCH ML: at 09:48

## 2016-01-07 NOTE — CP.PCM.PN
Subjective





- Subjective


Subjective: 


still on 1:1 and restraints for agitation/aggression.





Review of Systems





- Review of Systems


All systems: reviewed and no additional remarkable complaints except





- Constitutional


Constitutional: absent: Fever





Objective





- Vital Signs/Intake and Output


Vital Signs (last 24 hours): 


 Vital Signs - 24 hr











  01/06/16 01/07/16 01/07/16





  20:54 08:00 09:49


 


Temperature  97 F L 


 


Pulse Rate 87 71 71


 


Respiratory  20 





Rate   


 


Blood Pressure 137/76 122/81 122/81


 


O2 Sat by Pulse  100 





Oximetry   














  01/07/16





  16:10


 


Temperature 98.2 F


 


Pulse Rate 80


 


Respiratory 20





Rate 


 


Blood Pressure 114/61


 


O2 Sat by Pulse 97





Oximetry 














- Medications


Medications: 


 Current Medications





Aspirin (Ecotrin)  [facility]ILY [facility]


   Last Admin: 01/07/16 09:48 Dose:  81 mg


Enalapril Maleate (Vasotec)  10 mg PO [facility]ILY [facility]


   Last Admin: 01/07/16 09:48 Dose:  10 mg


Enoxaparin Sodium (Lovenox)  40 mg SC [facility]ILY [facility]


   Last Admin: 01/07/16 09:48 Dose:  40 mg


Famotidine (Pepcid)  20 mg GT BID [facility]


   Last Admin: 01/07/16 16:51 Dose:  20 mg


Haloperidol Lactate (Haldol)  2.5 mg IM Q6 PRN


   PRN Reason: Agitation


Cefepime HCl 2 gm/ Sodium (Chloride)  100 mls @ 100 mls/hr IVPB Q12 [facility]


   Last Admin: 01/07/16 09:19 Dose:  100 mls/hr


Levetiracetam (Keppra)  500 mg PO BID [facility]


   Last Admin: 01/07/16 16:49 Dose:  500 mg


Lorazepam (Ativan)  1 mg IVP Q4 PRN


   PRN Reason: Agitation


   Last Admin: 01/06/16 13:53 Dose:  1 mg


Metoprolol Tartrate (Lopressor)  100 mg PO Q12 [facility]


   Last Admin: 01/07/16 09:49 Dose:  100 mg


Multivitamins/Vitamin C (Multi-Delyn Liquid)  5 ml PO DAILY [facility]


   Last Admin: 01/07/16 09:48 Dose:  5 ml


Nitroglycerin (Nitro-Bid 2% Oint)  2 in TOP Q6 [facility]


   Last Admin: 01/07/16 16:49 Dose:  2 in


Quetiapine Fumarate (Seroquel)  12.5 mg PO HS [facility]


   Last Admin: 01/06/16 21:19 Dose:  12.5 mg











- Labs


Labs (last 24 hours): 


 Laboratory Results - last 24 hr











  01/06/16 01/06/16 01/07/16





  06:30 21:46 05:49


 


POC Glucose (mg/dL)  87  96  83














  01/07/16





  10:47


 


POC Glucose (mg/dL)  110














- Constitutional


Appears: Non-toxic, Chronically Ill





- Head Exam


Head Exam: NORMOCEPHALIC





- Eye Exam


Eye Exam: absent: Scleral icterus





- ENT Exam


ENT Exam: Mucous Membranes Dry





- Neck Exam


Neck exam: absent: Lymphadenopathy





- Respiratory Exam


Respiratory Exam: Decreased Breath Sounds, Rhonchi





- Cardiovascular Exam


Cardiovascular Exam: REGULAR RHYTHM, +S1, +S2





- GI/Abdominal Exam


GI & Abdominal Exam: Diminished Bowel Sounds, Soft.  absent: Tenderness





- Rectal Exam


Rectal Exam: Deferred





-  Exam


 Exam: NORMAL INSPECTION





- Extremities Exam


Extremities exam: pedal pulses present.  absent: pedal edema, tenderness





- Back Exam


Back exam: absent: CVA tenderness (L), CVA tenderness (R)





- Neurological Exam


Neurological exam: Alert, CN II-XII Intact, Oriented x3





- Skin


Skin Exam: Dry, Intact





Assessment/Plan


(1) Agitation


Current Visit: Yes   Status: Acute


Comment: 1:1


restraints required to be replaced


ativan prn








(2) Aspiration pneumonia


Current Visit: Yes   Status: Acute


Comment: CXR improving-repeat ordered for am


cont IV Cefapime


BD NEBS Q 6H PRN


psudomonas in trach asp


ID f/u appretiated


?? ability to d/c anbx


cxr improving








(3) Cardiac arrest


Current Visit: Yes   Status: Acute


Comment: Anoxic brain injury


Cont. ASA,  Vasotec and Lopressor


no cardio interventions planned at present.


BRITTA


cont guardianship process








(4) Diabetes mellitus with hyperglycemia


Current Visit: Yes   Status: Chronic


Comment: riss per protocol


much more controlled, will cont ot monitor








(5) History of seizure


Current Visit: Yes   Status: Chronic


Comment: Cont. keppra 500 mg BID


Seizure Precatuion








(6) NSTEMI (non-ST elevated myocardial infarction)


Current Visit: Yes   Status: Acute


Comment: 


Cont. ASA,  Vasotec and Lopressor

## 2016-01-07 NOTE — CP.PCM.PN
Subjective





- Subjective


Subjective: 


pt comfortable in bed, no complaints offered, still on 1:1 and restraints for 

agitation/aggression. b/w noted, no complaints offered. pending guardianship/

pacement





Review of Systems





- Review of Systems


Systems not reviewed;Unavailable: Altered Mental Status





- Constitutional


Constitutional: UN





- EENT


Eyes: UNREMARKABLE


Ears: UNREMARKABLE


Nose/Mouth/Throat: UNREMARKABLE





- Cardiovascular


Cardiovascular: UNREMARKABLE





- Respiratory


Respiratory: UNREMARKABLE





- Gastrointestinal


Gastrointestinal: UNREMARKABLE





- Genitourinary


Genitourinary: UNREMARKABLE





- Reproductive: Male


Reproductive:Male: UNREMARKABLE





- Musculoskeletal


Musculoskeletal: UNREMARKABLE





- Integumentary


Integumentary: UNREMARKABLE





- Neurological


Neurological: UNREMARKABLE





- Psychiatric


Psychiatric: UNREMARKABLE





- Endocrine


Endocrine: UNREMARKABLE





- Hematologic/Lymphatic


Hematologic: UNREMARKABLE





Objective





- Vital Signs/Intake and Output


Vital Signs (last 24 hours): 


 Vital Signs - 24 hr











  01/06/16 01/06/16 01/06/16





  08:13 10:34 16:39


 


Temperature 97.5 F L  97.7 F


 


Pulse Rate 84 84 84


 


Respiratory 20  





Rate   


 


Blood Pressure 141/82 141/82 143/86


 


O2 Sat by Pulse 99  98





Oximetry   














  01/06/16





  20:54


 


Temperature 


 


Pulse Rate 87


 


Respiratory 





Rate 


 


Blood Pressure 137/76


 


O2 Sat by Pulse 





Oximetry 














- Medications


Medications: 


 Current Medications





Aspirin (Ecotrin)  81 mg PO DAILY [facility]


   Last Admin: 01/06/16 10:30 Dose:  81 mg


Enalapril Maleate (Vasotec)  10 mg PO DAILY [facility]


   Last Admin: 01/06/16 10:30 Dose:  10 mg


Enoxaparin Sodium (Lovenox)  40 mg SC DAILY [facility]


   Last Admin: 01/06/16 10:42 Dose:  40 mg


Famotidine (Pepcid)  20 mg GT BID [facility]


   Last Admin: 01/06/16 17:54 Dose:  20 mg


Haloperidol Lactate (Haldol)  2.5 mg IM Q6 PRN


   PRN Reason: Agitation


Cefepime HCl 2 gm/ Sodium (Chloride)  100 mls @ 100 mls/hr IVPB Q12 [facility]


   Last Admin: 01/06/16 20:56 Dose:  100 mls/hr


Levetiracetam (Keppra)  500 mg PO BID [facility]


   Last Admin: 01/06/16 17:53 Dose:  500 mg


Lorazepam (Ativan)  1 mg IVP Q4 PRN


   PRN Reason: Agitation


   Last Admin: 01/06/16 13:53 Dose:  1 mg


Metoprolol Tartrate (Lopressor)  100 mg PO Q12 [facility]


   Last Admin: 01/06/16 20:54 Dose:  100 mg


Multivitamins/Vitamin C (Multi-Delyn Liquid)  5 ml PO DAILY [facility]


   Last Admin: 01/06/16 10:38 Dose:  5 ml


Nitroglycerin (Nitro-Bid 2% Oint)  2 in TOP Q6 [facility]


   Last Admin: 01/07/16 05:00 Dose:  2 in


Quetiapine Fumarate (Seroquel)  12.5 mg PO HS [facility]


   Last Admin: 01/06/16 21:19 Dose:  12.5 mg











- Labs


Labs (last 24 hours): 


 Laboratory Results - last 24 hr











  01/06/16 01/06/16 01/06/16





  04:30 06:30 15:51


 


WBC  12.2 H  


 


RBC  3.25 L  


 


Hgb  10.4 L  


 


Hct  32.1 L  


 


MCV  98.9 H  


 


MCH  32.1 H  


 


MCHC  32.5 L  


 


RDW  13.2  


 


Plt Count  383  


 


MPV  7.5  


 


Neut % (Auto)  69.3  


 


Lymph % (Auto)  16.8 L  


 


Mono % (Auto)  7.4  


 


Eos % (Auto)  6.1 H  


 


Baso % (Auto)  0.4  


 


Neut #  8.5 H  


 


Lymph #  2.1  


 


Mono #  0.9 H  


 


Eos #  0.7  


 


Baso #  0.1  


 


Sodium  140  


 


Potassium  4.0  


 


Chloride  100  


 


Carbon Dioxide  29  


 


Anion Gap  16  


 


BUN  14  


 


Creatinine  1.0  


 


Est GFR ( Amer)  > 60  


 


Est GFR (Non-Af Amer)  > 60  


 


POC Glucose (mg/dL)   87  101


 


Random Glucose  95  


 


Calcium  9.8  


 


Total Bilirubin  0.7  


 


AST  29  


 


ALT  29  


 


Alkaline Phosphatase  153 H  


 


Total Protein  8.6 H  


 


Albumin  4.0  


 


Globulin  4.6 H  


 


Albumin/Globulin Ratio  0.9 L  














  01/06/16 01/07/16





  21:46 05:49


 


WBC  


 


RBC  


 


Hgb  


 


Hct  


 


MCV  


 


MCH  


 


MCHC  


 


RDW  


 


Plt Count  


 


MPV  


 


Neut % (Auto)  


 


Lymph % (Auto)  


 


Mono % (Auto)  


 


Eos % (Auto)  


 


Baso % (Auto)  


 


Neut #  


 


Lymph #  


 


Mono #  


 


Eos #  


 


Baso #  


 


Sodium  


 


Potassium  


 


Chloride  


 


Carbon Dioxide  


 


Anion Gap  


 


BUN  


 


Creatinine  


 


Est GFR ( Amer)  


 


Est GFR (Non-Af Amer)  


 


POC Glucose (mg/dL)  96  83


 


Random Glucose  


 


Calcium  


 


Total Bilirubin  


 


AST  


 


ALT  


 


Alkaline Phosphatase  


 


Total Protein  


 


Albumin  


 


Globulin  


 


Albumin/Globulin Ratio  














- Constitutional


Appears: Well, Non-toxic, No Acute Distress





- Head Exam


Head Exam: ATRAUMATIC, NORMAL INSPECTION, NORMOCEPHALIC





- Eye Exam


Eye Exam: EOMI





- Respiratory Exam


Respiratory Exam: Clear to PA & Lateral, NORMAL BREATHING PATTERN, UNREMARKABLE





- Cardiovascular Exam


Cardiovascular Exam: REGULAR RHYTHM, RRR





- GI/Abdominal Exam


GI & Abdominal Exam: Normal Bowel Sounds, Soft, Unremarkable





- Extremities Exam


Extremities exam: full ROM, normal capillary refill, normal inspection, pedal 

pulses present





- Back Exam


Back exam: FULL ROM





- Neurological Exam


Neurological exam: Abnormal Gait, Alert, Altered, CN II-XII Intact, Reflexes 

Normal





- Psychiatric Exam


Psychiatric exam: Normal Affect, Normal Mood





- Skin


Skin Exam: Dry, Intact, Normal Color, Warm





Assessment/Plan


(1) Cardiac arrest


Current Visit: Yes   Status: Acute


Comment: Anoxic brain injury


Cont. ASA,  Vasotec and Lopressor


no cardio interventions planned at present.


BRITTA


cont guardianship process








(2) DVT prophylaxis


Current Visit: Yes   Status: Acute


Comment: SCDs and sage renew lovenox








(3) Scrotal edema


Current Visit: Yes   Status: Chronic


Comment: scrotal us-noted


urology consult-dr cortes to see pt when stable








(4) History of seizure


Current Visit: Yes   Status: Chronic


Comment: Cont. keppra 500 mg BID


Seizure Precatuion








(5) Diabetes mellitus with hyperglycemia


Current Visit: Yes   Status: Chronic


Comment: riss per protocol


much more controlled, will cont ot monitor








(6) Agitation


Current Visit: Yes   Status: Acute


Comment: 1:1


restraints required to be replaced


ativan prn








(7) Aspiration pneumonia


Current Visit: Yes   Status: Acute


Comment: CXR improving-repeat ordered for am


cont IV Cefapime


BD NEBS Q 6H PRN


psudomonas in trach asp


ID f/u appretiated


?? ability to d/c anbx


cxr improving








(8) Acute respiratory failure


Current Visit: Yes   Status: Acute


Comment: penidng cxr results this am-overall lungs improving


CXR 12/28/15 


Cardiomegaly. 


Improving vascular congestion. 


Slightly improved patchy infiltrate left mid and lower lung field.  Minor mild 

increased density right lung base


small pleural effusion

## 2016-01-08 LAB
ALBUMIN SERPL-MCNC: 3.7 G/DL (ref 3.5–5)
ALBUMIN/GLOB SERPL: 0.8 {RATIO} (ref 1–2.1)
ALT SERPL-CCNC: 25 U/L (ref 21–72)
AST SERPL-CCNC: 30 U/L (ref 17–59)
BASOPHILS # BLD AUTO: 0 K/UL (ref 0–0.2)
BASOPHILS NFR BLD: 0.4 % (ref 0–2)
BUN SERPL-MCNC: 14 MG/DL (ref 9–20)
CALCIUM SERPL-MCNC: 9.6 MG/DL (ref 8.4–10.2)
EOSINOPHIL # BLD AUTO: 0.7 K/UL (ref 0–0.7)
EOSINOPHIL NFR BLD: 6.8 % (ref 0–4)
ERYTHROCYTE [DISTWIDTH] IN BLOOD BY AUTOMATED COUNT: 13.5 % (ref 11.5–14.5)
GFR NON-AFRICAN AMERICAN: > 60
HGB BLD-MCNC: 10.5 G/DL (ref 12–18)
LYMPHOCYTES # BLD AUTO: 2.2 K/UL (ref 1–4.3)
LYMPHOCYTES NFR BLD AUTO: 20.2 % (ref 20–40)
MCH RBC QN AUTO: 31.2 PG (ref 27–31)
MCHC RBC AUTO-ENTMCNC: 31.5 G/DL (ref 33–37)
MCV RBC AUTO: 99.1 FL (ref 80–94)
MONOCYTES # BLD: 0.9 K/UL (ref 0–0.8)
MONOCYTES NFR BLD: 8.3 % (ref 0–10)
NEUTROPHILS # BLD: 6.9 K/UL (ref 1.8–7)
NEUTROPHILS NFR BLD AUTO: 64.3 % (ref 50–75)
NRBC BLD AUTO-RTO: 0 % (ref 0–0)
PLATELET # BLD: 352 K/UL (ref 130–400)
PMV BLD AUTO: 7.6 FL (ref 7.2–11.7)
RBC # BLD AUTO: 3.35 MIL/UL (ref 4.4–5.9)
WBC # BLD AUTO: 10.7 K/UL (ref 4.8–10.8)

## 2016-01-08 RX ADMIN — NITROGLYCERIN SCH IN: 20 OINTMENT TOPICAL at 21:58

## 2016-01-08 RX ADMIN — NITROGLYCERIN SCH IN: 20 OINTMENT TOPICAL at 05:30

## 2016-01-08 RX ADMIN — NITROGLYCERIN SCH IN: 20 OINTMENT TOPICAL at 17:03

## 2016-01-08 RX ADMIN — Medication SCH ML: at 08:50

## 2016-01-08 RX ADMIN — NITROGLYCERIN SCH IN: 20 OINTMENT TOPICAL at 09:35

## 2016-01-08 RX ADMIN — ENOXAPARIN SODIUM SCH MG: 40 INJECTION SUBCUTANEOUS at 08:49

## 2016-01-08 NOTE — CP.PCM.PN
Subjective





- Subjective


Subjective: 


agitated


rx in progress








Review of Systems





- Review of Systems


All systems: reviewed and no additional remarkable complaints except





- Constitutional


Constitutional: absent: Fever





Objective





- Vital Signs/Intake and Output


Vital Signs (last 24 hours): 


 Vital Signs - 24 hr











  01/07/16 01/07/16 01/08/16





  16:10 22:30 00:03


 


Temperature 98.2 F  97.2 F L


 


Pulse Rate 80 85 74


 


Respiratory 20  19





Rate   


 


Blood Pressure 114/61 154/96 H 146/85


 


O2 Sat by Pulse 97  99





Oximetry   














  01/08/16





  08:05


 


Temperature 97.2 F L


 


Pulse Rate 67


 


Respiratory 20





Rate 


 


Blood Pressure 130/89


 


O2 Sat by Pulse 98





Oximetry 














- Medications


Medications: 


 Current Medications





Aspirin (Ecotrin)  81 mg PO DAILY [facility]


   Last Admin: 01/08/16 08:48 Dose:  81 mg


Enalapril Maleate (Vasotec)  10 mg PO DAILY [facility]


   Last Admin: 01/08/16 08:51 Dose:  10 mg


Enoxaparin Sodium (Lovenox)  40 mg SC DAILY [facility]


   Last Admin: 01/08/16 08:49 Dose:  40 mg


Famotidine (Pepcid)  20 mg GT BID [facility]


   Last Admin: 01/08/16 08:50 Dose:  20 mg


Haloperidol Lactate (Haldol)  2.5 mg IM Q6 PRN


   PRN Reason: Agitation


Cefepime HCl 2 gm/ Dextrose  50 mls @ 50 mls/hr IVPB Q12 [facility]


Levetiracetam (Keppra)  500 mg PO BID [facility]


   Last Admin: 01/08/16 08:49 Dose:  500 mg


Lorazepam (Ativan)  1 mg IVP Q4 PRN


   PRN Reason: Agitation


   Last Admin: 01/07/16 20:53 Dose:  1 mg


Metoprolol Tartrate (Lopressor)  100 mg PO Q12 [facility]


   Last Admin: 01/08/16 08:49 Dose:  100 mg


Multivitamins/Vitamin C (Multi-Delyn Liquid)  5 ml PO DAILY [facility]


   Last Admin: 01/08/16 08:50 Dose:  5 ml


Nitroglycerin (Nitro-Bid 2% Oint)  2 in TOP Q6 [facility]


   Last Admin: 01/08/16 09:35 Dose:  2 in


Quetiapine Fumarate (Seroquel)  12.5 mg PO HS [facility]


   Last Admin: 01/07/16 22:31 Dose:  12.5 mg











- Labs


Labs (last 24 hours): 


 Laboratory Results - last 24 hr











  01/07/16 01/07/16 01/08/16





  15:46 21:17 04:30


 


WBC    10.7


 


RBC    3.35 L


 


Hgb    10.5 L


 


Hct    33.2 L


 


MCV    99.1 H


 


MCH    31.2 H


 


MCHC    31.5 L


 


RDW    13.5


 


Plt Count    352


 


MPV    7.6


 


Neut % (Auto)    64.3


 


Lymph % (Auto)    20.2


 


Mono % (Auto)    8.3


 


Eos % (Auto)    6.8 H


 


Baso % (Auto)    0.4


 


Neut #    6.9


 


Lymph #    2.2


 


Mono #    0.9 H


 


Eos #    0.7


 


Baso #    0.0


 


Sodium    140


 


Potassium    3.9


 


Chloride    104


 


Carbon Dioxide    26


 


Anion Gap    14


 


BUN    14


 


Creatinine    0.7 L


 


Est GFR ( Amer)    > 60


 


Est GFR (Non-Af Amer)    > 60


 


POC Glucose (mg/dL)  93  94 


 


Random Glucose    86


 


Calcium    9.6


 


Total Bilirubin    0.5


 


AST    30


 


ALT    25


 


Alkaline Phosphatase    134 H


 


Total Protein    8.3 H


 


Albumin    3.7


 


Globulin    4.6 H


 


Albumin/Globulin Ratio    0.8 L














  01/08/16





  05:53


 


WBC 


 


RBC 


 


Hgb 


 


Hct 


 


MCV 


 


MCH 


 


MCHC 


 


RDW 


 


Plt Count 


 


MPV 


 


Neut % (Auto) 


 


Lymph % (Auto) 


 


Mono % (Auto) 


 


Eos % (Auto) 


 


Baso % (Auto) 


 


Neut # 


 


Lymph # 


 


Mono # 


 


Eos # 


 


Baso # 


 


Sodium 


 


Potassium 


 


Chloride 


 


Carbon Dioxide 


 


Anion Gap 


 


BUN 


 


Creatinine 


 


Est GFR ( Amer) 


 


Est GFR (Non-Af Amer) 


 


POC Glucose (mg/dL)  76


 


Random Glucose 


 


Calcium 


 


Total Bilirubin 


 


AST 


 


ALT 


 


Alkaline Phosphatase 


 


Total Protein 


 


Albumin 


 


Globulin 


 


Albumin/Globulin Ratio 














- Constitutional


Appears: Non-toxic, Chronically Ill





- Head Exam


Head Exam: NORMOCEPHALIC





- Eye Exam


Eye Exam: absent: Scleral icterus





- Neck Exam


Neck exam: absent: Lymphadenopathy





- Respiratory Exam


Respiratory Exam: Decreased Breath Sounds, Rhonchi





- Cardiovascular Exam


Cardiovascular Exam: REGULAR RHYTHM, +S1, +S2





- GI/Abdominal Exam


GI & Abdominal Exam: Normal Bowel Sounds, Soft.  absent: Tenderness





- Rectal Exam


Rectal Exam: Deferred





-  Exam


 Exam: NORMAL INSPECTION





- Extremities Exam


Extremities exam: pedal pulses present.  absent: pedal edema, tenderness





- Back Exam


Back exam: absent: CVA tenderness (L), CVA tenderness (R)





- Neurological Exam


Neurological exam: Alert, Altered, CN II-XII Intact





Assessment/Plan


(1) Agitation


Current Visit: Yes   Status: Acute


Comment: 1:1


restraints required to be replaced


ativan prn








(2) Aspiration pneumonia


Current Visit: Yes   Status: Acute


Comment: CXR improving-repeat ordered for am


cont IV Cefapime


BD NEBS Q 6H PRN


psudomonas in trach asp


ID f/u appretiated


?? ability to d/c anbx


cxr improving








(3) Cardiac arrest


Current Visit: Yes   Status: Acute


Comment: Anoxic brain injury


Cont. ASA,  Vasotec and Lopressor


no cardio interventions planned at present.


BRITTA


cont guardianship process








(4) Diabetes mellitus with hyperglycemia


Current Visit: Yes   Status: Chronic


Comment: riss per protocol


much more controlled, will cont ot monitor








(5) History of seizure


Current Visit: Yes   Status: Chronic


Comment: Cont. keppra 500 mg BID


Seizure Precatuion








(6) NSTEMI (non-ST elevated myocardial infarction)


Current Visit: Yes   Status: Acute


Comment: 


Cont. ASA,  Vasotec and Lopressor

## 2016-01-08 NOTE — CP.PCM.PN
Subjective





- Subjective


Subjective: 


pt remains agittaed at times w/ 1:1 to remain at bedside.  restraints are 

required for safety.  pending guardianship. 


pt is able to state year and that he feels ok





Review of Systems





- Review of Systems


Systems not reviewed;Unavailable: Altered Mental Status





- Constitutional


Constitutional: UN





- EENT


Eyes: UNREMARKABLE


Ears: UNREMARKABLE


Nose/Mouth/Throat: UNREMARKABLE





- Cardiovascular


Cardiovascular: UNREMARKABLE





- Respiratory


Respiratory: UNREMARKABLE





- Gastrointestinal


Gastrointestinal: UNREMARKABLE





- Genitourinary


Genitourinary: UNREMARKABLE





- Reproductive: Male


Reproductive:Male: UNREMARKABLE





- Musculoskeletal


Musculoskeletal: UNREMARKABLE





- Integumentary


Integumentary: UNREMARKABLE





- Neurological


Neurological: UNREMARKABLE





- Psychiatric


Psychiatric: UNREMARKABLE





- Endocrine


Endocrine: UNREMARKABLE





- Hematologic/Lymphatic


Hematologic: UNREMARKABLE





Objective





- Vital Signs/Intake and Output


Vital Signs (last 24 hours): 


 Vital Signs - 24 hr











  01/07/16 01/07/16 01/07/16





  08:00 09:49 16:10


 


Temperature 97 F L  98.2 F


 


Pulse Rate 71 71 80


 


Respiratory 20  20





Rate   


 


Blood Pressure 122/81 122/81 114/61


 


O2 Sat by Pulse 100  97





Oximetry   














  01/07/16 01/08/16





  22:30 00:03


 


Temperature  97.2 F L


 


Pulse Rate 85 74


 


Respiratory  19





Rate  


 


Blood Pressure 154/96 H 146/85


 


O2 Sat by Pulse  99





Oximetry  














- Medications


Medications: 


 Current Medications





Aspirin (Ecotrin)  81 mg PO DAILY [facility]


   Last Admin: 01/07/16 09:48 Dose:  81 mg


Enalapril Maleate (Vasotec)  10 mg PO DAILY [facility]


   Last Admin: 01/07/16 09:48 Dose:  10 mg


Enoxaparin Sodium (Lovenox)  40 mg SC DAILY [facility]


   Last Admin: 01/07/16 09:48 Dose:  40 mg


Famotidine (Pepcid)  20 mg GT BID [facility]


   Last Admin: 01/07/16 16:51 Dose:  20 mg


Haloperidol Lactate (Haldol)  2.5 mg IM Q6 PRN


   PRN Reason: Agitation


Cefepime HCl 2 gm/ Sodium (Chloride)  100 mls @ 100 mls/hr IVPB Q12 [facility]


   Last Admin: 01/07/16 22:31 Dose:  100 mls/hr


Levetiracetam (Keppra)  500 mg PO BID [facility]


   Last Admin: 01/07/16 16:49 Dose:  500 mg


Lorazepam (Ativan)  1 mg IVP Q4 PRN


   PRN Reason: Agitation


   Last Admin: 01/07/16 20:53 Dose:  1 mg


Metoprolol Tartrate (Lopressor)  100 mg PO Q12 [facility]


   Last Admin: 01/07/16 22:30 Dose:  100 mg


Multivitamins/Vitamin C (Multi-Delyn Liquid)  5 ml PO DAILY [facility]


   Last Admin: 01/07/16 09:48 Dose:  5 ml


Nitroglycerin (Nitro-Bid 2% Oint)  2 in TOP Q6 [facility]


   Last Admin: 01/08/16 05:30 Dose:  2 in


Quetiapine Fumarate (Seroquel)  12.5 mg PO HS [facility]


   Last Admin: 01/07/16 22:31 Dose:  12.5 mg











- Labs


Labs (last 24 hours): 


 Laboratory Results - last 24 hr











  01/07/16 01/07/16 01/07/16





  10:47 15:46 21:17


 


POC Glucose (mg/dL)  110  93  94














  01/08/16





  05:53


 


POC Glucose (mg/dL)  76














- Constitutional


Appears: Non-toxic, No Acute Distress, Chronically Ill





- Head Exam


Head Exam: ATRAUMATIC, NORMAL INSPECTION, NORMOCEPHALIC





- Eye Exam


Eye Exam: EOMI





- Respiratory Exam


Respiratory Exam: Clear to PA & Lateral, NORMAL BREATHING PATTERN, UNREMARKABLE





- Cardiovascular Exam


Cardiovascular Exam: REGULAR RHYTHM, RRR





- GI/Abdominal Exam


GI & Abdominal Exam: Normal Bowel Sounds, Soft, Unremarkable





- Extremities Exam


Extremities exam: full ROM, normal capillary refill, normal inspection, pedal 

pulses present





- Back Exam


Back exam: FULL ROM





- Neurological Exam


Neurological exam: Abnormal Gait, Alert, CN II-XII Intact, Reflexes Normal





- Psychiatric Exam


Psychiatric exam: Normal Affect, Normal Mood





- Skin


Skin Exam: Dry, Intact, Normal Color, Warm





Assessment/Plan


(1) Cardiac arrest


Current Visit: Yes   Status: Acute


Comment: Anoxic brain injury


Cont. ASA,  Vasotec and Lopressor


no cardio interventions planned at present.


BRITTA


cont guardianship process








(2) DVT prophylaxis


Current Visit: Yes   Status: Acute


Comment: SCDs and sage renew lovenox








(3) Scrotal edema


Current Visit: Yes   Status: Chronic


Comment: scrotal us-noted


urology consult-dr cortes to see pt when stable








(4) History of seizure


Current Visit: Yes   Status: Chronic


Comment: Cont. keppra 500 mg BID


Seizure Precatuion








(5) Diabetes mellitus with hyperglycemia


Current Visit: Yes   Status: Chronic


Comment: riss per protocol


much more controlled, will cont ot monitor








(6) Agitation


Current Visit: Yes   Status: Acute


Comment: 1:1


restraints required to be replaced


ativan prn








(7) Aspiration pneumonia


Current Visit: Yes   Status: Acute


Comment: CXR improving-repeat ordered for am


cont IV Cefapime


BD NEBS Q 6H PRN


psudomonas in trach asp


ID f/u appretiated


?? ability to d/c anbx


cxr improving








(8) Acute respiratory failure


Current Visit: Yes   Status: Acute


Comment: penidng cxr results this am-overall lungs improving


CXR 12/28/15 


Cardiomegaly. 


Improving vascular congestion. 


Slightly improved patchy infiltrate left mid and lower lung field.  Minor mild 

increased density right lung base


small pleural effusion

















- Assessment and Plan (Free Text)


Assessment: 


saftey to be maintained. pending guardianship

## 2016-01-09 RX ADMIN — NITROGLYCERIN SCH IN: 20 OINTMENT TOPICAL at 16:31

## 2016-01-09 RX ADMIN — Medication SCH ML: at 10:39

## 2016-01-09 RX ADMIN — NITROGLYCERIN SCH IN: 20 OINTMENT TOPICAL at 04:19

## 2016-01-09 RX ADMIN — NITROGLYCERIN SCH IN: 20 OINTMENT TOPICAL at 21:43

## 2016-01-09 RX ADMIN — NITROGLYCERIN SCH IN: 20 OINTMENT TOPICAL at 10:32

## 2016-01-09 RX ADMIN — ENOXAPARIN SODIUM SCH MG: 40 INJECTION SUBCUTANEOUS at 10:34

## 2016-01-09 NOTE — CP.PCM.PN
Subjective





- Subjective


Subjective: 


pt remains as assessed, still w/ periods of agitation, worse after evening 

time.  stil gets agitated and agressive and tries to get out of bed. this 

requires 1:1 and restraings. no physical changes noted.





Review of Systems





- Constitutional


Constitutional: UN





- EENT


Eyes: UNREMARKABLE


Ears: UNREMARKABLE


Nose/Mouth/Throat: UNREMARKABLE





- Cardiovascular


Cardiovascular: UNREMARKABLE





- Respiratory


Respiratory: UNREMARKABLE





- Gastrointestinal


Gastrointestinal: UNREMARKABLE





- Genitourinary


Genitourinary: UNREMARKABLE





- Reproductive: Male


Reproductive:Male: UNREMARKABLE





- Musculoskeletal


Musculoskeletal: UNREMARKABLE





- Integumentary


Integumentary: UNREMARKABLE





- Neurological


Neurological: As Per HPI





- Psychiatric


Psychiatric: As Per HPI, Irritability





- Endocrine


Endocrine: UNREMARKABLE





- Hematologic/Lymphatic


Hematologic: UNREMARKABLE





Objective





- Vital Signs/Intake and Output


Vital Signs (last 24 hours): 


 Vital Signs - 24 hr











  01/08/16 01/08/16 01/09/16





  16:03 21:57 00:00


 


Temperature 98.1 F  97.9 F


 


Pulse Rate 72 94 H 73


 


Respiratory 18  19





Rate   


 


Blood Pressure 142/76 140/89 128/78


 


O2 Sat by Pulse 98  98





Oximetry   














  01/09/16 01/09/16





  04:12 08:18


 


Temperature  97.7 F


 


Pulse Rate 69 77


 


Respiratory  18





Rate  


 


Blood Pressure 134/84 108/61


 


O2 Sat by Pulse  98





Oximetry  














- Medications


Medications: 


 Current Medications





Aspirin (Ecotrin)  81 mg PO DAILY [facility]


   Last Admin: 01/08/16 08:48 Dose:  81 mg


Enalapril Maleate (Vasotec)  10 mg PO DAILY [facility]


   Last Admin: 01/08/16 08:51 Dose:  10 mg


Enoxaparin Sodium (Lovenox)  40 mg SC DAILY [facility]


   Last Admin: 01/08/16 08:49 Dose:  40 mg


Famotidine (Pepcid)  20 mg GT BID [facility]


   Last Admin: 01/08/16 17:03 Dose:  20 mg


Haloperidol Lactate (Haldol)  2.5 mg IM Q6 PRN


   PRN Reason: Agitation


Cefepime HCl 2 gm/ Dextrose  50 mls @ 50 mls/hr IVPB Q12 [facility]


   Last Admin: 01/08/16 21:59 Dose:  50 mls/hr


Levetiracetam (Keppra)  500 mg PO BID [facility]


   Last Admin: 01/08/16 17:02 Dose:  500 mg


Lorazepam (Ativan)  1 mg IVP Q4 PRN


   PRN Reason: Agitation


   Last Admin: 01/08/16 20:50 Dose:  1 mg


Metoprolol Tartrate (Lopressor)  100 mg PO Q12 [facility]


   Last Admin: 01/08/16 21:57 Dose:  100 mg


Multivitamins/Vitamin C (Multi-Delyn Liquid)  5 ml PO DAILY [facility]


   Last Admin: 01/08/16 08:50 Dose:  5 ml


Nitroglycerin (Nitro-Bid 2% Oint)  2 in TOP Q6 [facility]


   Last Admin: 01/09/16 04:19 Dose:  2 in


Quetiapine Fumarate (Seroquel)  12.5 mg PO HS [facility]


   Last Admin: 01/08/16 21:57 Dose:  12.5 mg











- Labs


Labs (last 24 hours): 


 Laboratory Results - last 24 hr











  01/08/16 01/08/16 01/08/16





  10:57 15:32 22:24


 


POC Glucose (mg/dL)  79  99  76














  01/09/16





  05:36


 


POC Glucose (mg/dL)  89














- Constitutional


Appears: Non-toxic, No Acute Distress





- Head Exam


Head Exam: ATRAUMATIC, NORMAL INSPECTION, NORMOCEPHALIC





- Eye Exam


Eye Exam: EOMI





- Respiratory Exam


Respiratory Exam: Clear to PA & Lateral, NORMAL BREATHING PATTERN, UNREMARKABLE





- Cardiovascular Exam


Cardiovascular Exam: REGULAR RHYTHM, RRR





- GI/Abdominal Exam


GI & Abdominal Exam: Normal Bowel Sounds, Soft, Unremarkable





- Extremities Exam


Extremities exam: full ROM, normal capillary refill, normal inspection, pedal 

pulses present





- Back Exam


Back exam: FULL ROM





- Neurological Exam


Neurological exam: Alert, CN II-XII Intact, Normal Gait, Oriented x3, Reflexes 

Normal





- Psychiatric Exam


Psychiatric exam: Normal Affect, Normal Mood





- Skin


Skin Exam: Dry, Intact, Normal Color, Warm





Assessment/Plan


(1) Cardiac arrest


Current Visit: Yes   Status: Acute


Comment: Anoxic brain injury


Cont. ASA,  Vasotec and Lopressor


no cardio interventions planned at present.


BRITTA


cont guardianship process








(2) DVT prophylaxis


Current Visit: Yes   Status: Acute


Comment: SCDs and sage renew lovenox








(3) Scrotal edema


Current Visit: Yes   Status: Chronic


Comment: scrotal us-noted


urology consult-dr cortes to see pt when stable








(4) History of seizure


Current Visit: Yes   Status: Chronic


Comment: Cont. keppra 500 mg BID


Seizure Precatuion








(5) Diabetes mellitus with hyperglycemia


Current Visit: Yes   Status: Chronic


Comment: riss per protocol


much more controlled, will cont ot monitor








(6) Agitation


Current Visit: Yes   Status: Acute


Comment: 1:1


restraints required to be replaced


ativan prn


seroquel started 12.5 mg per psych reommendations w/ minimal effect.  will incr 

dose and change time to 5pm as that is when he starts to get more agitated. 

will monitor








(7) Aspiration pneumonia


Current Visit: Yes   Status: Acute


Comment: CXR improving-repeat ordered for am


cont IV Cefapime


BD NEBS Q 6H PRN


psudomonas in trach asp


ID f/u appretiated


?? ability to d/c anbx


cxr improving








(8) Acute respiratory failure


Current Visit: Yes   Status: Acute


Comment: penidng cxr results this am-overall lungs improving


CXR 12/28/15 


Cardiomegaly. 


Improving vascular congestion. 


Slightly improved patchy infiltrate left mid and lower lung field.  Minor mild 

increased density right lung base


small pleural effusion

















- Assessment and Plan (Free Text)


Assessment: 


will repeat b/w and cxr in am

## 2016-01-10 LAB
ALBUMIN SERPL-MCNC: 3.6 G/DL (ref 3.5–5)
ALBUMIN/GLOB SERPL: 0.8 {RATIO} (ref 1–2.1)
ALT SERPL-CCNC: 20 U/L (ref 21–72)
AST SERPL-CCNC: 22 U/L (ref 17–59)
BASOPHILS # BLD AUTO: 0.1 K/UL (ref 0–0.2)
BASOPHILS NFR BLD: 0.7 % (ref 0–2)
BUN SERPL-MCNC: 12 MG/DL (ref 9–20)
CALCIUM SERPL-MCNC: 9.3 MG/DL (ref 8.4–10.2)
EOSINOPHIL # BLD AUTO: 0.7 K/UL (ref 0–0.7)
EOSINOPHIL NFR BLD: 9.3 % (ref 0–4)
ERYTHROCYTE [DISTWIDTH] IN BLOOD BY AUTOMATED COUNT: 13.3 % (ref 11.5–14.5)
GFR NON-AFRICAN AMERICAN: > 60
HGB BLD-MCNC: 9.8 G/DL (ref 12–18)
LYMPHOCYTES # BLD AUTO: 1.4 K/UL (ref 1–4.3)
LYMPHOCYTES NFR BLD AUTO: 18.4 % (ref 20–40)
MCH RBC QN AUTO: 31.1 PG (ref 27–31)
MCHC RBC AUTO-ENTMCNC: 31.6 G/DL (ref 33–37)
MCV RBC AUTO: 98.5 FL (ref 80–94)
MONOCYTES # BLD: 0.6 K/UL (ref 0–0.8)
MONOCYTES NFR BLD: 7.8 % (ref 0–10)
NEUTROPHILS # BLD: 5 K/UL (ref 1.8–7)
NEUTROPHILS NFR BLD AUTO: 63.8 % (ref 50–75)
NRBC BLD AUTO-RTO: 0 % (ref 0–0)
PLATELET # BLD: 302 K/UL (ref 130–400)
PMV BLD AUTO: 7.1 FL (ref 7.2–11.7)
RBC # BLD AUTO: 3.15 MIL/UL (ref 4.4–5.9)
WBC # BLD AUTO: 7.8 K/UL (ref 4.8–10.8)

## 2016-01-10 RX ADMIN — NITROGLYCERIN SCH IN: 20 OINTMENT TOPICAL at 16:21

## 2016-01-10 RX ADMIN — NITROGLYCERIN SCH IN: 20 OINTMENT TOPICAL at 04:20

## 2016-01-10 RX ADMIN — NITROGLYCERIN SCH IN: 20 OINTMENT TOPICAL at 21:37

## 2016-01-10 RX ADMIN — NITROGLYCERIN SCH IN: 20 OINTMENT TOPICAL at 08:59

## 2016-01-10 RX ADMIN — ENOXAPARIN SODIUM SCH MG: 40 INJECTION SUBCUTANEOUS at 08:40

## 2016-01-10 RX ADMIN — Medication SCH ML: at 08:41

## 2016-01-10 NOTE — CP.PCM.PN
Subjective





- Subjective


Subjective: 


pt calm in bed w/ 1:1 at bedside.  no agitation/distress. no f/c, n/v/d. b/w 

noted. 





Review of Systems





- Review of Systems


Systems not reviewed;Unavailable: Altered Mental Status





- Constitutional


Constitutional: UN





- EENT


Eyes: UNREMARKABLE


Ears: UNREMARKABLE


Nose/Mouth/Throat: UNREMARKABLE





- Cardiovascular


Cardiovascular: UNREMARKABLE





- Respiratory


Respiratory: UNREMARKABLE





- Gastrointestinal


Gastrointestinal: UNREMARKABLE





- Genitourinary


Genitourinary: UNREMARKABLE





- Reproductive: Male


Reproductive:Male: UNREMARKABLE





- Musculoskeletal


Musculoskeletal: UNREMARKABLE





- Integumentary


Integumentary: UNREMARKABLE





- Neurological


Neurological: UNREMARKABLE





- Psychiatric


Psychiatric: UNREMARKABLE





- Endocrine


Endocrine: UNREMARKABLE





- Hematologic/Lymphatic


Hematologic: UNREMARKABLE





Objective





- Vital Signs/Intake and Output


Vital Signs (last 24 hours): 


 Vital Signs - 24 hr











  01/09/16 01/09/16 01/09/16





  16:12 22:00 23:57


 


Temperature 97 F L  97.3 F L


 


Pulse Rate 78 83 69


 


Respiratory 20  19





Rate   


 


Blood Pressure 156/88 H 125/91 H 126/67


 


O2 Sat by Pulse 100  99





Oximetry   














  01/10/16 01/10/16 01/10/16





  04:24 07:39 08:41


 


Temperature  97.2 F L 


 


Pulse Rate 83 72 72


 


Respiratory  20 





Rate   


 


Blood Pressure 135/82 128/80 128/80


 


O2 Sat by Pulse  98 





Oximetry   














- Medications


Medications: 


 Current Medications





Aspirin (Ecotrin)  81 mg PO DAILY [facility]


   Last Admin: 01/10/16 08:41 Dose:  81 mg


Enalapril Maleate (Vasotec)  10 mg PO DAILY [facility]


   Last Admin: 01/10/16 08:57 Dose:  10 mg


Enoxaparin Sodium (Lovenox)  40 mg SC DAILY [facility]


   Last Admin: 01/10/16 08:40 Dose:  40 mg


Famotidine (Pepcid)  20 mg GT BID [facility]


   Last Admin: 01/10/16 08:41 Dose:  20 mg


Haloperidol Lactate (Haldol)  2.5 mg IM Q6 PRN


   PRN Reason: Agitation


Cefepime HCl 2 gm/ Dextrose  50 mls @ 50 mls/hr IVPB Q12 [facility]


   Last Admin: 01/10/16 08:39 Dose:  50 mls/hr


Levetiracetam (Keppra)  500 mg PO BID [facility]


   Last Admin: 01/10/16 08:42 Dose:  500 mg


Lorazepam (Ativan)  1 mg IVP Q4 PRN


   PRN Reason: Agitation


   Last Admin: 01/09/16 14:31 Dose:  1 mg


Metoprolol Tartrate (Lopressor)  100 mg PO Q12 [facility]


   Last Admin: 01/10/16 08:41 Dose:  100 mg


Multivitamins/Vitamin C (Multi-Delyn Liquid)  5 ml PO DAILY [facility]


   Last Admin: 01/10/16 08:41 Dose:  5 ml


Nitroglycerin (Nitro-Bid 2% Oint)  2 in TOP Q6 [facility]


   Last Admin: 01/10/16 08:59 Dose:  2 in


Quetiapine Fumarate (Seroquel)  25 mg PO DAILY [facility]


   Last Admin: 01/09/16 16:31 Dose:  25 mg











- Labs


Labs (last 24 hours): 


 Laboratory Results - last 24 hr











  01/09/16 01/09/16 01/10/16





  10:57 16:02 05:44


 


WBC   


 


RBC   


 


Hgb   


 


Hct   


 


MCV   


 


MCH   


 


MCHC   


 


RDW   


 


Plt Count   


 


MPV   


 


Neut % (Auto)   


 


Lymph % (Auto)   


 


Mono % (Auto)   


 


Eos % (Auto)   


 


Baso % (Auto)   


 


Neut #   


 


Lymph #   


 


Mono #   


 


Eos #   


 


Baso #   


 


Sodium   


 


Potassium   


 


Chloride   


 


Carbon Dioxide   


 


Anion Gap   


 


BUN   


 


Creatinine   


 


Est GFR ( Amer)   


 


Est GFR (Non-Af Amer)   


 


POC Glucose (mg/dL)  91  103  83


 


Random Glucose   


 


Calcium   


 


Total Bilirubin   


 


AST   


 


ALT   


 


Alkaline Phosphatase   


 


Total Protein   


 


Albumin   


 


Globulin   


 


Albumin/Globulin Ratio   














  01/10/16





  08:10


 


WBC  7.8


 


RBC  3.15 L


 


Hgb  9.8 L


 


Hct  31.0 L


 


MCV  98.5 H


 


MCH  31.1 H


 


MCHC  31.6 L


 


RDW  13.3


 


Plt Count  302


 


MPV  7.1 L


 


Neut % (Auto)  63.8


 


Lymph % (Auto)  18.4 L


 


Mono % (Auto)  7.8


 


Eos % (Auto)  9.3 H


 


Baso % (Auto)  0.7


 


Neut #  5.0


 


Lymph #  1.4


 


Mono #  0.6


 


Eos #  0.7


 


Baso #  0.1


 


Sodium  141


 


Potassium  3.8


 


Chloride  103


 


Carbon Dioxide  27


 


Anion Gap  15


 


BUN  12


 


Creatinine  0.8


 


Est GFR ( Amer)  > 60


 


Est GFR (Non-Af Amer)  > 60


 


POC Glucose (mg/dL) 


 


Random Glucose  91


 


Calcium  9.3


 


Total Bilirubin  0.5


 


AST  22


 


ALT  20 L


 


Alkaline Phosphatase  122


 


Total Protein  8.0


 


Albumin  3.6


 


Globulin  4.4 H


 


Albumin/Globulin Ratio  0.8 L














- Constitutional


Appears: Well, Non-toxic, No Acute Distress





- Head Exam


Head Exam: ATRAUMATIC, NORMAL INSPECTION, NORMOCEPHALIC





- Eye Exam


Eye Exam: EOMI





- Respiratory Exam


Respiratory Exam: Clear to PA & Lateral, NORMAL BREATHING PATTERN, UNREMARKABLE





- Cardiovascular Exam


Cardiovascular Exam: REGULAR RHYTHM, RRR





- GI/Abdominal Exam


GI & Abdominal Exam: Normal Bowel Sounds, Soft, Unremarkable





- Extremities Exam


Extremities exam: full ROM, normal capillary refill, normal inspection, pedal 

pulses present





- Back Exam


Back exam: FULL ROM





- Neurological Exam


Neurological exam: Abnormal Gait, Alert, Altered, CN II-XII Intact, Reflexes 

Normal


Additional comments: 


able to state year and that he is feeling better





- Psychiatric Exam


Psychiatric exam: Normal Affect, Normal Mood





- Skin


Skin Exam: Dry, Intact, Normal Color, Warm





Assessment/Plan


(1) Cardiac arrest


Current Visit: Yes   Status: Acute


Comment: Anoxic brain injury


Cont. ASA,  Vasotec and Lopressor


no cardio interventions planned at present.


BRITTA


cont guardianship process








(2) DVT prophylaxis


Current Visit: Yes   Status: Acute


Comment: SCDs and sage renew lovenox








(3) Scrotal edema


Current Visit: Yes   Status: Chronic


Comment: scrotal us-noted


urology consult-dr cortes to see pt when stable








(4) History of seizure


Current Visit: Yes   Status: Chronic


Comment: Cont. keppra 500 mg BID


Seizure Precatuion








(5) Diabetes mellitus with hyperglycemia


Current Visit: Yes   Status: Chronic


Comment: riss per protocol


much more controlled, will cont ot monitor








(6) Agitation


Current Visit: Yes   Status: Acute


Comment: 1:1


restraints required to be replaced-to be d/c today 1/10/16


ativan prn


seroquel started 12.5 mg per psych reommendations w/ minimal effect.  will incr 

dose and change time to 5pm as that is when he starts to get more agitated. 

will monitor








(7) Aspiration pneumonia


Current Visit: Yes   Status: Acute


Comment: CXR improving-repeat ordered for am


cont IV Cefapime


BD NEBS Q 6H PRN


psudomonas in trach asp


ID f/u appretiated


?? ability to d/c anbx


cxr improving








(8) Acute respiratory failure


Current Visit: Yes   Status: Acute


Comment: penidng cxr results this am-overall lungs improving


CXR 12/28/15 


Cardiomegaly. 


Improving vascular congestion. 


Slightly improved patchy infiltrate left mid and lower lung field.  Minor mild 

increased density right lung base


small pleural effusion

## 2016-01-10 NOTE — CP.PCM.PN
Subjective





- Subjective


Subjective: 


pt calm in bed w/ 1:1 at bedside.  no agitation/distress. no f/c, n/v/d. b/w 

noted. 








Review of Systems





- Review of Systems


All systems: reviewed and no additional remarkable complaints except





- Constitutional


Constitutional: absent: Fever





Objective





- Vital Signs/Intake and Output


Vital Signs (last 24 hours): 


 Vital Signs - 24 hr











  01/09/16 01/09/16 01/09/16





  16:12 22:00 23:57


 


Temperature 97 F L  97.3 F L


 


Pulse Rate 78 83 69


 


Respiratory 20  19





Rate   


 


Blood Pressure 156/88 H 125/91 H 126/67


 


O2 Sat by Pulse 100  99





Oximetry   














  01/10/16 01/10/16 01/10/16





  04:24 07:39 08:41


 


Temperature  97.2 F L 


 


Pulse Rate 83 72 72


 


Respiratory  20 





Rate   


 


Blood Pressure 135/82 128/80 128/80


 


O2 Sat by Pulse  98 





Oximetry   














- Medications


Medications: 


 Current Medications





Aspirin (Ecotrin)  81 mg PO DAILY [facility]


   Last Admin: 01/10/16 08:41 Dose:  81 mg


Enalapril Maleate (Vasotec)  10 mg PO DAILY [facility]


   Last Admin: 01/10/16 08:57 Dose:  10 mg


Enoxaparin Sodium (Lovenox)  40 mg SC DAILY [facility]


   Last Admin: 01/10/16 08:40 Dose:  40 mg


Famotidine (Pepcid)  20 mg GT BID [facility]


   Last Admin: 01/10/16 08:41 Dose:  20 mg


Haloperidol Lactate (Haldol)  2.5 mg IM Q6 PRN


   PRN Reason: Agitation


Cefepime HCl 2 gm/ Dextrose  50 mls @ 50 mls/hr IVPB Q12 [facility]


   Last Admin: 01/10/16 08:39 Dose:  50 mls/hr


Levetiracetam (Keppra)  500 mg PO BID [facility]


   Last Admin: 01/10/16 08:42 Dose:  500 mg


Lorazepam (Ativan)  1 mg IVP Q4 PRN


   PRN Reason: Agitation


   Last Admin: 01/09/16 14:31 Dose:  1 mg


Metoprolol Tartrate (Lopressor)  100 mg PO Q12 [facility]


   Last Admin: 01/10/16 08:41 Dose:  100 mg


Multivitamins/Vitamin C (Multi-Delyn Liquid)  5 ml PO DAILY [facility]


   Last Admin: 01/10/16 08:41 Dose:  5 ml


Nitroglycerin (Nitro-Bid 2% Oint)  2 in TOP Q6 [facility]


   Last Admin: 01/10/16 08:59 Dose:  2 in


Quetiapine Fumarate (Seroquel)  25 mg PO DAILY [facility]


   Last Admin: 01/09/16 16:31 Dose:  25 mg











- Labs


Labs (last 24 hours): 


 Laboratory Results - last 24 hr











  01/09/16 01/10/16 01/10/16





  16:02 05:44 08:10


 


WBC    7.8


 


RBC    3.15 L


 


Hgb    9.8 L


 


Hct    31.0 L


 


MCV    98.5 H


 


MCH    31.1 H


 


MCHC    31.6 L


 


RDW    13.3


 


Plt Count    302


 


MPV    7.1 L


 


Neut % (Auto)    63.8


 


Lymph % (Auto)    18.4 L


 


Mono % (Auto)    7.8


 


Eos % (Auto)    9.3 H


 


Baso % (Auto)    0.7


 


Neut #    5.0


 


Lymph #    1.4


 


Mono #    0.6


 


Eos #    0.7


 


Baso #    0.1


 


Sodium    141


 


Potassium    3.8


 


Chloride    103


 


Carbon Dioxide    27


 


Anion Gap    15


 


BUN    12


 


Creatinine    0.8


 


Est GFR ( Amer)    > 60


 


Est GFR (Non-Af Amer)    > 60


 


POC Glucose (mg/dL)  103  83 


 


Random Glucose    91


 


Calcium    9.3


 


Total Bilirubin    0.5


 


AST    22


 


ALT    20 L


 


Alkaline Phosphatase    122


 


Total Protein    8.0


 


Albumin    3.6


 


Globulin    4.4 H


 


Albumin/Globulin Ratio    0.8 L














  01/10/16





  10:40


 


WBC 


 


RBC 


 


Hgb 


 


Hct 


 


MCV 


 


MCH 


 


MCHC 


 


RDW 


 


Plt Count 


 


MPV 


 


Neut % (Auto) 


 


Lymph % (Auto) 


 


Mono % (Auto) 


 


Eos % (Auto) 


 


Baso % (Auto) 


 


Neut # 


 


Lymph # 


 


Mono # 


 


Eos # 


 


Baso # 


 


Sodium 


 


Potassium 


 


Chloride 


 


Carbon Dioxide 


 


Anion Gap 


 


BUN 


 


Creatinine 


 


Est GFR ( Amer) 


 


Est GFR (Non-Af Amer) 


 


POC Glucose (mg/dL)  125 H


 


Random Glucose 


 


Calcium 


 


Total Bilirubin 


 


AST 


 


ALT 


 


Alkaline Phosphatase 


 


Total Protein 


 


Albumin 


 


Globulin 


 


Albumin/Globulin Ratio 














- Constitutional


Appears: Non-toxic, Chronically Ill





- Head Exam


Head Exam: NORMOCEPHALIC





- Eye Exam


Eye Exam: absent: Scleral icterus





- ENT Exam


ENT Exam: Mucous Membranes Dry





- Neck Exam


Neck exam: absent: Lymphadenopathy





- Respiratory Exam


Respiratory Exam: Decreased Breath Sounds, Rhonchi





- Cardiovascular Exam


Cardiovascular Exam: REGULAR RHYTHM, +S1, +S2





- GI/Abdominal Exam


GI & Abdominal Exam: Diminished Bowel Sounds, Soft.  absent: Tenderness





- Rectal Exam


Rectal Exam: Deferred





-  Exam


 Exam: NORMAL INSPECTION





- Extremities Exam


Extremities exam: pedal pulses present.  absent: pedal edema, tenderness





- Back Exam


Back exam: absent: CVA tenderness (L), CVA tenderness (R), paraspinal tenderness





- Neurological Exam


Neurological exam: Alert, Altered, CN II-XII Intact





Assessment/Plan


(1) Agitation


Current Visit: Yes   Status: Acute


Comment: 1:1


restraints required to be replaced-to be d/c today 1/10/16


ativan prn


seroquel started 12.5 mg per psych reommendations w/ minimal effect.  will incr 

dose and change time to 5pm as that is when he starts to get more agitated. 

will monitor








(2) Aspiration pneumonia


Current Visit: Yes   Status: Acute


Comment: CXR improving-repeat ordered for am


cont IV Cefapime


BD NEBS Q 6H PRN


psudomonas in trach asp


ID f/u appretiated


?? ability to d/c anbx


cxr improving








(3) Cardiac arrest


Current Visit: Yes   Status: Acute


Comment: Anoxic brain injury


Cont. ASA,  Vasotec and Lopressor


no cardio interventions planned at present.


BRITTA


cont guardianship process








(4) Diabetes mellitus with hyperglycemia


Current Visit: Yes   Status: Chronic


Comment: riss per protocol


much more controlled, will cont ot monitor








(5) History of seizure


Current Visit: Yes   Status: Chronic


Comment: Cont. keppra 500 mg BID


Seizure Precatuion








(6) NSTEMI (non-ST elevated myocardial infarction)


Current Visit: Yes   Status: Acute


Comment: 


Cont. ASA,  Vasotec and Lopressor

## 2016-01-11 RX ADMIN — ENOXAPARIN SODIUM SCH MG: 40 INJECTION SUBCUTANEOUS at 09:01

## 2016-01-11 RX ADMIN — NITROGLYCERIN SCH IN: 20 OINTMENT TOPICAL at 16:57

## 2016-01-11 RX ADMIN — NITROGLYCERIN SCH IN: 20 OINTMENT TOPICAL at 20:59

## 2016-01-11 RX ADMIN — NITROGLYCERIN SCH IN: 20 OINTMENT TOPICAL at 09:04

## 2016-01-11 RX ADMIN — Medication SCH ML: at 09:03

## 2016-01-11 RX ADMIN — NITROGLYCERIN SCH IN: 20 OINTMENT TOPICAL at 04:20

## 2016-01-11 NOTE — RAD
PROCEDURE:  CHEST RADIOGRAPH, 1 VIEW Technique: Single view portable  

erect @ 11:20. 



HISTORY:

aspiration pneumonia`



COMPARISON:

AllNone available.



FINDINGS:



LUNGS:

Clear.



PLEURA:

No pneumothorax or pleural fluid seen.



CARDIOVASCULAR:

Normal.



OSSEOUS STRUCTURES:

No acute abnormalities.  Severe degenerative changes left shoulder



VISUALIZED UPPER ABDOMEN:

Normal.



OTHER FINDINGS:

None. 



IMPRESSION:

No significant interval change compared to the prior examination(s).

## 2016-01-11 NOTE — CP.PCM.PN
Subjective





- Date & Time of Evaluation


Date of Evaluation: 01/11/16


Time of Evaluation: 11:00





- Subjective


Subjective: 


weak but alert   improving 





Objective





- Vital Signs/Intake and Output


Vital Signs (last 24 hours): 


 











Temp Pulse Resp BP Pulse Ox


 


 97.5 F L  66   20   124/78   100 


 


 01/11/16 08:15  01/11/16 09:01  01/11/16 08:15  01/11/16 09:01  01/11/16 08:15











- Medications


Medications: 


 Current Medications





Aspirin (Ecotrin)  81 mg PO DAILY [facility]


   Last Admin: 01/11/16 08:59 Dose:  81 mg


Enalapril Maleate (Vasotec)  10 mg PO DAILY [facility]


   Last Admin: 01/11/16 09:05 Dose:  10 mg


Enoxaparin Sodium (Lovenox)  40 mg SC DAILY [facility]


   Last Admin: 01/11/16 09:01 Dose:  40 mg


Famotidine (Pepcid)  20 mg GT BID [facility]


   Last Admin: 01/11/16 09:04 Dose:  20 mg


Haloperidol Lactate (Haldol)  2.5 mg IM Q6 PRN


   PRN Reason: Agitation


Cefepime HCl 2 gm/ Dextrose  50 mls @ 50 mls/hr IVPB Q12 [facility]


   Last Admin: 01/11/16 09:02 Dose:  50 mls/hr


Levetiracetam (Keppra)  500 mg PO BID [facility]


   Last Admin: 01/11/16 09:00 Dose:  500 mg


Lorazepam (Ativan)  1 mg IVP Q4 PRN


   PRN Reason: Agitation


   Last Admin: 01/10/16 21:33 Dose:  1 mg


Metoprolol Tartrate (Lopressor)  100 mg PO Q12 [facility]


   Last Admin: 01/11/16 09:01 Dose:  100 mg


Multivitamins/Vitamin C (Multi-Delyn Liquid)  5 ml PO DAILY [facility]


   Last Admin: 01/11/16 09:03 Dose:  5 ml


Nitroglycerin (Nitro-Bid 2% Oint)  2 in TOP Q6 [facility]


   Last Admin: 01/11/16 09:04 Dose:  2 in


Quetiapine Fumarate (Seroquel)  25 mg PO DAILY@1700 [facility]











- Labs


Labs: 


 





 01/10/16 08:10 





 01/10/16 08:10 





 











PT  11.2 SECONDS (9.4-12.0)   12/14/15  05:20    


 


INR  1.05  (0.89-1.20)   12/14/15  05:20    


 


APTT  36.2 SECONDS (23.1-32.0)  H  12/14/15  05:20    














- Constitutional


Appears: Non-toxic, Chronically Ill





- Head Exam


Head Exam: NORMOCEPHALIC





- Eye Exam


Eye Exam: absent: Scleral icterus





- Neck Exam


Neck Exam: absent: Lymphadenopathy





- Respiratory Exam


Respiratory Exam: Decreased Breath Sounds, Rhonchi





- Cardiovascular Exam


Cardiovascular Exam: REGULAR RHYTHM, +S1, +S2





- GI/Abdominal Exam


GI & Abdominal Exam: Distended, Soft





-  Exam


 Exam: NORMAL INSPECTION





- Extremities Exam


Extremities Exam: absent: Pedal Edema





- Back Exam


Back Exam: absent: CVA tenderness (L), CVA tenderness (R)





- Neurological Exam


Neurological Exam: Alert, Awake





Assessment and Plan


(1) Agitation


Status: Acute





(2) Aspiration pneumonia


Status: Acute





(3) Cardiac arrest


Status: Acute





(4) Diabetes mellitus with hyperglycemia


Status: Chronic





(5) History of seizure


Status: Chronic





(6) NSTEMI (non-ST elevated myocardial infarction)


Status: Acute

## 2016-01-11 NOTE — CP.PCM.PN
Subjective





- Subjective


Subjective: 


pt calmer at present, still has periods of agitation and gets aggessive.  no f/c

, n/v/d.  stillw / 1:1 and restraints for safety





Review of Systems





- Review of Systems


Systems not reviewed;Unavailable: Altered Mental Status





- Constitutional


Constitutional: UN





- EENT


Eyes: UNREMARKABLE


Ears: UNREMARKABLE


Nose/Mouth/Throat: UNREMARKABLE





- Cardiovascular


Cardiovascular: UNREMARKABLE





- Respiratory


Respiratory: UNREMARKABLE





- Gastrointestinal


Gastrointestinal: UNREMARKABLE





- Genitourinary


Genitourinary: UNREMARKABLE





- Reproductive: Male


Reproductive:Male: UNREMARKABLE





- Musculoskeletal


Musculoskeletal: UNREMARKABLE





- Integumentary


Integumentary: UNREMARKABLE





- Neurological


Neurological: As Per HPI





- Psychiatric


Psychiatric: As Per HPI, Behavioral Changes





- Endocrine


Endocrine: UNREMARKABLE





- Hematologic/Lymphatic


Hematologic: UNREMARKABLE





Objective





- Vital Signs/Intake and Output


Vital Signs (last 24 hours): 


 Vital Signs - 24 hr











  01/10/16 01/10/16 01/10/16





  08:41 16:21 21:32


 


Temperature  97.5 F L 


 


Pulse Rate 72 67 72


 


Respiratory  18 





Rate   


 


Blood Pressure 128/80 159/98 H 157/90 H


 


O2 Sat by Pulse  98 





Oximetry   














  01/11/16





  00:00


 


Temperature 97.7 F


 


Pulse Rate 70


 


Respiratory 19





Rate 


 


Blood Pressure 120/76


 


O2 Sat by Pulse 99





Oximetry 














- Medications


Medications: 


 Current Medications





Aspirin (Ecotrin)  81 mg PO DAILY [facility]


   Last Admin: 01/10/16 08:41 Dose:  81 mg


Enalapril Maleate (Vasotec)  10 mg PO DAILY [facility]


   Last Admin: 01/10/16 08:57 Dose:  10 mg


Enoxaparin Sodium (Lovenox)  40 mg SC DAILY [facility]


   Last Admin: 01/10/16 08:40 Dose:  40 mg


Famotidine (Pepcid)  20 mg GT BID [facility]


   Last Admin: 01/10/16 16:22 Dose:  20 mg


Haloperidol Lactate (Haldol)  2.5 mg IM Q6 PRN


   PRN Reason: Agitation


Cefepime HCl 2 gm/ Dextrose  50 mls @ 50 mls/hr IVPB Q12 [facility]


   Last Admin: 01/10/16 21:35 Dose:  50 mls/hr


Levetiracetam (Keppra)  500 mg PO BID [facility]


   Last Admin: 01/10/16 16:21 Dose:  500 mg


Lorazepam (Ativan)  1 mg IVP Q4 PRN


   PRN Reason: Agitation


   Last Admin: 01/10/16 21:33 Dose:  1 mg


Metoprolol Tartrate (Lopressor)  100 mg PO Q12 [facility]


   Last Admin: 01/10/16 21:32 Dose:  100 mg


Multivitamins/Vitamin C (Multi-Delyn Liquid)  5 ml PO DAILY [facility]


   Last Admin: 01/10/16 08:41 Dose:  5 ml


Nitroglycerin (Nitro-Bid 2% Oint)  2 in TOP Q6 [facility]


   Last Admin: 01/11/16 04:20 Dose:  2 in


Quetiapine Fumarate (Seroquel)  25 mg PO DAILY@1700 [facility]











- Labs


Labs (last 24 hours): 


 Laboratory Results - last 24 hr











  01/10/16 01/10/16 01/10/16





  08:10 10:40 15:37


 


WBC  7.8  


 


RBC  3.15 L  


 


Hgb  9.8 L  


 


Hct  31.0 L  


 


MCV  98.5 H  


 


MCH  31.1 H  


 


MCHC  31.6 L  


 


RDW  13.3  


 


Plt Count  302  


 


MPV  7.1 L  


 


Neut % (Auto)  63.8  


 


Lymph % (Auto)  18.4 L  


 


Mono % (Auto)  7.8  


 


Eos % (Auto)  9.3 H  


 


Baso % (Auto)  0.7  


 


Neut #  5.0  


 


Lymph #  1.4  


 


Mono #  0.6  


 


Eos #  0.7  


 


Baso #  0.1  


 


Sodium  141  


 


Potassium  3.8  


 


Chloride  103  


 


Carbon Dioxide  27  


 


Anion Gap  15  


 


BUN  12  


 


Creatinine  0.8  


 


Est GFR ( Amer)  > 60  


 


Est GFR (Non-Af Amer)  > 60  


 


POC Glucose (mg/dL)   125 H  107


 


Random Glucose  91  


 


Calcium  9.3  


 


Total Bilirubin  0.5  


 


AST  22  


 


ALT  20 L  


 


Alkaline Phosphatase  122  


 


Total Protein  8.0  


 


Albumin  3.6  


 


Globulin  4.4 H  


 


Albumin/Globulin Ratio  0.8 L  














  01/11/16





  05:14


 


WBC 


 


RBC 


 


Hgb 


 


Hct 


 


MCV 


 


MCH 


 


MCHC 


 


RDW 


 


Plt Count 


 


MPV 


 


Neut % (Auto) 


 


Lymph % (Auto) 


 


Mono % (Auto) 


 


Eos % (Auto) 


 


Baso % (Auto) 


 


Neut # 


 


Lymph # 


 


Mono # 


 


Eos # 


 


Baso # 


 


Sodium 


 


Potassium 


 


Chloride 


 


Carbon Dioxide 


 


Anion Gap 


 


BUN 


 


Creatinine 


 


Est GFR ( Amer) 


 


Est GFR (Non-Af Amer) 


 


POC Glucose (mg/dL)  75


 


Random Glucose 


 


Calcium 


 


Total Bilirubin 


 


AST 


 


ALT 


 


Alkaline Phosphatase 


 


Total Protein 


 


Albumin 


 


Globulin 


 


Albumin/Globulin Ratio 














- Constitutional


Appears: Well, Non-toxic, No Acute Distress





- Head Exam


Head Exam: ATRAUMATIC, NORMAL INSPECTION, NORMOCEPHALIC





- Eye Exam


Eye Exam: EOMI





- Respiratory Exam


Respiratory Exam: Clear to PA & Lateral, NORMAL BREATHING PATTERN, UNREMARKABLE





- Cardiovascular Exam


Cardiovascular Exam: REGULAR RHYTHM, RRR





- GI/Abdominal Exam


GI & Abdominal Exam: Normal Bowel Sounds, Soft, Unremarkable





- Extremities Exam


Extremities exam: full ROM, normal capillary refill, normal inspection, pedal 

pulses present





- Back Exam


Back exam: FULL ROM





- Neurological Exam


Neurological exam: Abnormal Gait, Alert, Altered, CN II-XII Intact, Reflexes 

Normal





- Psychiatric Exam


Psychiatric exam: Normal Affect, Normal Mood





- Skin


Skin Exam: Dry, Intact, Normal Color, Warm





Assessment/Plan


(1) Cardiac arrest


Current Visit: Yes   Status: Acute


Comment: Anoxic brain injury


Cont. ASA,  Vasotec and Lopressor


no cardio interventions planned at present.


BRITTA


cont guardianship process








(2) DVT prophylaxis


Current Visit: Yes   Status: Acute


Comment: SCDs and sage renew lovenox








(3) Scrotal edema


Current Visit: Yes   Status: Chronic


Comment: scrotal us-noted


urology consult-dr cortes to see pt when stable








(4) History of seizure


Current Visit: Yes   Status: Chronic


Comment: Cont. keppra 500 mg BID


Seizure Precatuion








(5) Diabetes mellitus with hyperglycemia


Current Visit: Yes   Status: Chronic


Comment: riss per protocol


much more controlled, will cont ot monitor








(6) Agitation


Current Visit: Yes   Status: Acute


Comment: 1:1


restraints required to be replaced-to be d/c today 1/10/16


ativan prn


seroquel started 12.5 mg per psych reommendations w/ minimal effect.  will incr 

dose and change time to 5pm as that is when he starts to get more agitated. 

will monitor








(7) Aspiration pneumonia


Current Visit: Yes   Status: Acute


Comment: CXR improving-repeat ordered for am


cont IV Cefapime


BD NEBS Q 6H PRN


psudomonas in trach asp


ID f/u appretiated


?? ability to d/c anbx


cxr improving








(8) Acute respiratory failure


Current Visit: Yes   Status: Acute


Comment: penidng cxr results this am-overall lungs improving


CXR 12/28/15 


Cardiomegaly. 


Improving vascular congestion. 


Slightly improved patchy infiltrate left mid and lower lung field.  Minor mild 

increased density right lung base


small pleural effusion

















- Assessment and Plan (Free Text)


Assessment: 


will repeat cxr for healing of pna


cont to monitor agittation


pending guardianship

## 2016-01-12 RX ADMIN — ENOXAPARIN SODIUM SCH MG: 40 INJECTION SUBCUTANEOUS at 08:49

## 2016-01-12 RX ADMIN — NITROGLYCERIN SCH IN: 20 OINTMENT TOPICAL at 09:00

## 2016-01-12 RX ADMIN — NITROGLYCERIN SCH IN: 20 OINTMENT TOPICAL at 16:48

## 2016-01-12 RX ADMIN — NITROGLYCERIN SCH IN: 20 OINTMENT TOPICAL at 21:33

## 2016-01-12 RX ADMIN — NITROGLYCERIN SCH IN: 20 OINTMENT TOPICAL at 04:39

## 2016-01-12 RX ADMIN — Medication SCH ML: at 08:44

## 2016-01-12 NOTE — CP.PCM.PN
Subjective





- Date & Time of Evaluation


Date of Evaluation: 01/12/16


Time of Evaluation: 08:35





- Subjective


Subjective: 


pt comfortable in bed, no comlaints offered.  retraints d/c. calm in bed w/ 1:1 

at bedside





Objective





- Vital Signs/Intake and Output


Vital Signs (last 24 hours): 


 











Temp Pulse Resp BP Pulse Ox


 


 98.1 F   62   18   158/93 H  98 


 


 01/12/16 00:27  01/12/16 00:27  01/12/16 00:27  01/12/16 00:27  01/12/16 00:27











- Medications


Medications: 


 Current Medications





Aspirin (Ecotrin)  81 mg PO DAILY [facility]


   Last Admin: 01/11/16 08:59 Dose:  81 mg


Enalapril Maleate (Vasotec)  10 mg PO DAILY [facility]


   Last Admin: 01/11/16 09:05 Dose:  10 mg


Enoxaparin Sodium (Lovenox)  40 mg SC DAILY [facility]


   Last Admin: 01/11/16 09:01 Dose:  40 mg


Famotidine (Pepcid)  20 mg GT BID [facility]


   Last Admin: 01/11/16 16:57 Dose:  20 mg


Haloperidol Lactate (Haldol)  2.5 mg IM Q6 PRN


   PRN Reason: Agitation


Cefepime HCl 2 gm/ Dextrose  50 mls @ 50 mls/hr IVPB Q12 [facility]


   Last Admin: 01/11/16 20:46 Dose:  50 mls/hr


Levetiracetam (Keppra)  500 mg PO BID [facility]


   Last Admin: 01/11/16 09:00 Dose:  500 mg


Lorazepam (Ativan)  1 mg IVP Q4 PRN


   PRN Reason: Agitation


   Last Admin: 01/10/16 21:33 Dose:  1 mg


Metoprolol Tartrate (Lopressor)  100 mg PO Q12 [facility]


   Last Admin: 01/11/16 20:49 Dose:  100 mg


Multivitamins/Vitamin C (Multi-Delyn Liquid)  5 ml PO DAILY [facility]


   Last Admin: 01/11/16 09:03 Dose:  5 ml


Nitroglycerin (Nitro-Bid 2% Oint)  2 in TOP Q6 [facility]


   Last Admin: 01/12/16 04:39 Dose:  2 in


Quetiapine Fumarate (Seroquel)  25 mg PO DAILY@1700 [facility]


   Last Admin: 01/11/16 16:58 Dose:  25 mg











- Labs


Labs: 


 





 01/10/16 08:10 





 01/10/16 08:10 





 











PT  11.2 SECONDS (9.4-12.0)   12/14/15  05:20    


 


INR  1.05  (0.89-1.20)   12/14/15  05:20    


 


APTT  36.2 SECONDS (23.1-32.0)  H  12/14/15  05:20    














- Constitutional


Appears: Well, Non-toxic, No Acute Distress





- Head Exam


Head Exam: ATRAUMATIC, NORMAL INSPECTION, NORMOCEPHALIC





- Eye Exam


Eye Exam: EOMI, PERRL





- Respiratory Exam


Respiratory Exam: Clear to Ausculation Bilateral, NORMAL BREATHING PATTERN





- Cardiovascular Exam


Cardiovascular Exam: REGULAR RHYTHM, RRR





- GI/Abdominal Exam


GI & Abdominal Exam: Soft, Normal Bowel Sounds





- Extremities Exam


Extremities Exam: Full ROM, Normal Capillary Refill, Normal Inspection





- Neurological Exam


Neurological Exam: Abnormal Gait, Alert, Awake, CN II-XII Intact





- Psychiatric Exam


Psychiatric exam: Normal Affect, Normal Mood





- Skin


Skin Exam: Dry, Intact, Normal Color, Warm





Assessment and Plan


(1) Cardiac arrest


Assessment & Plan: 


cont asa, coreg, statin


cardio f/u as indicated


Status: Resolved





(2) DVT prophylaxis


Assessment & Plan: 


scd and ae hose, lovenox


Status: Acute





(3) Scrotal edema


Assessment & Plan: 


remains swollen, will monitor


Status: Chronic





(4) History of seizure


Assessment & Plan: 


keppra


Status: Chronic





(5) Diabetes mellitus with hyperglycemia


Assessment & Plan: 


fsbg bid, w/o coverage


Status: Chronic





(6) Agitation


Assessment & Plan: 


ativan prn


1:1


will d/c restraints as pt is calm and cooperative


Status: Acute





(7) Aspiration pneumonia


Assessment & Plan: 


xr appears stable


cont anbx


ID consult


Status: Acute





(8) Acute respiratory failure


Assessment & Plan: 


resp pattern well


lungs clear


will monitor cxr


Status: Resolved








- Assessment and Plan (Free Text)


Assessment: 


pending guardianship for placement

## 2016-01-13 RX ADMIN — NITROGLYCERIN SCH IN: 20 OINTMENT TOPICAL at 17:03

## 2016-01-13 RX ADMIN — NITROGLYCERIN SCH IN: 20 OINTMENT TOPICAL at 22:04

## 2016-01-13 RX ADMIN — NITROGLYCERIN SCH IN: 20 OINTMENT TOPICAL at 09:37

## 2016-01-13 RX ADMIN — NITROGLYCERIN SCH IN: 20 OINTMENT TOPICAL at 05:28

## 2016-01-13 RX ADMIN — ENOXAPARIN SODIUM SCH MG: 40 INJECTION SUBCUTANEOUS at 09:35

## 2016-01-13 RX ADMIN — Medication SCH ML: at 09:36

## 2016-01-13 NOTE — CP.PCM.PN
Subjective





- Date & Time of Evaluation


Date of Evaluation: 01/13/16


Time of Evaluation: 11:00





- Subjective


Subjective: 


AWAKE RESPONSIVE NAD





Objective





- Vital Signs/Intake and Output


Vital Signs (last 24 hours): 


 











Temp Pulse Resp BP Pulse Ox


 


 97.3 F L  69   20   160/64 H  99 


 


 01/13/16 08:06  01/13/16 10:36  01/13/16 08:06  01/13/16 10:36  01/13/16 08:06











- Medications


Medications: 


 Current Medications





Aspirin (Ecotrin)  81 mg PO DAILY [facility]


   Last Admin: 01/13/16 09:34 Dose:  81 mg


Enalapril Maleate (Vasotec)  10 mg PO DAILY [facility]


   Last Admin: 01/13/16 09:34 Dose:  10 mg


Enoxaparin Sodium (Lovenox)  40 mg SC DAILY [facility]


   Last Admin: 01/13/16 09:35 Dose:  40 mg


Famotidine (Pepcid)  20 mg GT BID [facility]


   Last Admin: 01/13/16 09:36 Dose:  20 mg


Haloperidol Lactate (Haldol)  2.5 mg IM Q6 PRN


   PRN Reason: Agitation


Cefepime HCl 2 gm/ Dextrose  50 mls @ 50 mls/hr IVPB Q12 [facility]


   Last Admin: 01/13/16 09:38 Dose:  50 mls/hr


Levetiracetam (Keppra)  500 mg PO BID [facility]


   Last Admin: 01/13/16 09:34 Dose:  500 mg


Lorazepam (Ativan)  1 mg IVP Q4 PRN


   PRN Reason: Agitation


   Last Admin: 01/12/16 21:29 Dose:  1 mg


Metoprolol Tartrate (Lopressor)  100 mg PO Q12 [facility]


   Last Admin: 01/13/16 10:36 Dose:  100 mg


Multivitamins/Vitamin C (Multi-Delyn Liquid)  5 ml PO DAILY [facility]


   Last Admin: 01/13/16 09:36 Dose:  5 ml


Nitroglycerin (Nitro-Bid 2% Oint)  2 in TOP Q6 [facility]


   Last Admin: 01/13/16 09:37 Dose:  2 in


Quetiapine Fumarate (Seroquel)  25 mg PO DAILY@1700 [facility]


   Last Admin: 01/12/16 16:49 Dose:  25 mg











- Labs


Labs: 


 





 01/10/16 08:10 





 01/10/16 08:10 





 











PT  11.2 SECONDS (9.4-12.0)   12/14/15  05:20    


 


INR  1.05  (0.89-1.20)   12/14/15  05:20    


 


APTT  36.2 SECONDS (23.1-32.0)  H  12/14/15  05:20    














- Constitutional


Appears: Non-toxic, Cachectic, Chronically Ill





- Head Exam


Head Exam: NORMOCEPHALIC





- Eye Exam


Eye Exam: absent: Scleral icterus





- ENT Exam


ENT Exam: Mucous Membranes Dry





- Neck Exam


Neck Exam: absent: Lymphadenopathy, Thyromegaly





- Respiratory Exam


Respiratory Exam: Decreased Breath Sounds, Rhonchi





- Cardiovascular Exam


Cardiovascular Exam: Tachycardia, REGULAR RHYTHM, +S1, +S2





- GI/Abdominal Exam


GI & Abdominal Exam: Soft.  absent: Tenderness





- Rectal Exam


Rectal Exam: Deferred





-  Exam


 Exam: NORMAL INSPECTION





- Extremities Exam


Extremities Exam: absent: Calf Tenderness, Pedal Edema, Tenderness





- Back Exam


Back Exam: absent: CVA tenderness (L), CVA tenderness (R)





- Neurological Exam


Neurological Exam: Alert, Awake





Assessment and Plan


(1) Agitation


Status: Acute





(2) Aspiration pneumonia


Status: Acute





(3) Cardiac arrest


Status: Resolved





(4) Diabetes mellitus with hyperglycemia


Status: Chronic





(5) History of seizure


Status: Chronic





(6) NSTEMI (non-ST elevated myocardial infarction)


Status: Acute

## 2016-01-14 RX ADMIN — Medication SCH ML: at 09:13

## 2016-01-14 RX ADMIN — NITROGLYCERIN SCH IN: 20 OINTMENT TOPICAL at 21:26

## 2016-01-14 RX ADMIN — NITROGLYCERIN SCH: 20 OINTMENT TOPICAL at 18:32

## 2016-01-14 RX ADMIN — ENOXAPARIN SODIUM SCH MG: 40 INJECTION SUBCUTANEOUS at 09:12

## 2016-01-14 RX ADMIN — NITROGLYCERIN SCH IN: 20 OINTMENT TOPICAL at 09:13

## 2016-01-14 RX ADMIN — NITROGLYCERIN SCH IN: 20 OINTMENT TOPICAL at 05:55

## 2016-01-14 NOTE — PN
DATE: 01/13/2016



SUBJECTIVE:  The patient evaluated in bed, calm and cooperative; 1:1 remains at bedside.  The patient
 has been without restraints for 24 hours and appears to be less agitated.  Overnight events reviewed
.  Speech path notes reviewed and will implement.  



REVIEW OF SYSTEMS:  Normal.



PHYSICAL EXAMINATION:

LUNGS:  Clear.

HEART:  Regular rate and rhythm.

ABDOMEN:  Soft and nontender.  Bowel sounds in all quadrants.

NEUROLOGIC:  The patient appears comfortable, alert, able to state year and that he is feeling well, 
which has been the patient's baseline.



ASSESSMENT AND PLAN:  Status post cardiac arrest.  Continue all medications.  Will continue to monito
r vital signs.  The patient appears to be stable in that perspective.  Agitation, remains on 1:1.  Wi
ll continue all medication - Ativan as needed, although patient has not needed it in the past couple 
days, and will continue Seroquel.  Psych followup as indicated.  Aspiration pneumonia appears to be s
table to resolving.  X-ray from 2 days ago noted.  Will continue antibiotics as per ID recommendation
s.  The patient remains pending guardianship for placement.  Will continue to monitor the patient.





__________________________________________

Fan SILVER







cc:



DD: 01/13/2016 09:15:50  [id number]

TT: 01/13/2016 09:36:19

Job # [id number]

mn

## 2016-01-14 NOTE — CP.PCM.PN
Subjective





- Date & Time of Evaluation


Date of Evaluation: 01/14/16


Time of Evaluation: 18:00





- Subjective


Subjective: 


ALERT  OOB  AFEB





Objective





- Vital Signs/Intake and Output


Vital Signs (last 24 hours): 


 











Temp Pulse Resp BP Pulse Ox


 


 97.9 F   61   18   125/79   100 


 


 01/14/16 15:46  01/14/16 15:46  01/14/16 15:46  01/14/16 15:46  01/14/16 15:46











- Medications


Medications: 


 Current Medications





Aspirin (Ecotrin)  81 mg PO DAILY [facility]


   Last Admin: 01/14/16 09:10 Dose:  81 mg


Enalapril Maleate (Vasotec)  10 mg PO DAILY [facility]


   Last Admin: 01/14/16 09:13 Dose:  10 mg


Enoxaparin Sodium (Lovenox)  40 mg SC DAILY [facility]


   Last Admin: 01/14/16 09:12 Dose:  40 mg


Famotidine (Pepcid)  20 mg GT BID [facility]


   Last Admin: 01/14/16 09:13 Dose:  20 mg


Haloperidol Lactate (Haldol)  2.5 mg IM Q6 PRN


   PRN Reason: Agitation


Levetiracetam (Keppra)  500 mg PO BID [facility]


   Last Admin: 01/14/16 09:10 Dose:  500 mg


Lorazepam (Ativan)  1 mg IVP Q4 PRN


   PRN Reason: Agitation


   Last Admin: 01/13/16 13:30 Dose:  1 mg


Metoprolol Tartrate (Lopressor)  100 mg PO Q12 [facility]


   Last Admin: 01/14/16 09:12 Dose:  100 mg


Multivitamins/Vitamin C (Multi-Delyn Liquid)  5 ml PO DAILY [facility]


   Last Admin: 01/14/16 09:13 Dose:  5 ml


Nitroglycerin (Nitro-Bid 2% Oint)  2 in TOP Q6 [facility]


   Last Admin: 01/14/16 09:13 Dose:  2 in


Quetiapine Fumarate (Seroquel)  25 mg PO DAILY@1700 [facility]


   Last Admin: 01/13/16 17:11 Dose:  25 mg











- Labs


Labs: 


 





 01/10/16 08:10 





 01/10/16 08:10 





 











PT  11.2 SECONDS (9.4-12.0)   12/14/15  05:20    


 


INR  1.05  (0.89-1.20)   12/14/15  05:20    


 


APTT  36.2 SECONDS (23.1-32.0)  H  12/14/15  05:20    














- Constitutional


Appears: Non-toxic, Chronically Ill





- Head Exam


Head Exam: NORMOCEPHALIC





- Eye Exam


Eye Exam: absent: Scleral icterus





- ENT Exam


ENT Exam: Mucous Membranes Dry





- Neck Exam


Neck Exam: absent: Lymphadenopathy





- Respiratory Exam


Respiratory Exam: Decreased Breath Sounds, Prolonged Expiratory Phase





- Cardiovascular Exam


Cardiovascular Exam: REGULAR RHYTHM, +S1, +S2





- GI/Abdominal Exam


GI & Abdominal Exam: Distended, Soft.  absent: Tenderness





Assessment and Plan


(1) Agitation


Status: Acute





(2) Aspiration pneumonia


Status: Acute





(3) Diabetes mellitus with hyperglycemia


Status: Chronic





(4) History of seizure


Status: Chronic





(5) NSTEMI (non-ST elevated myocardial infarction)


Status: Acute

## 2016-01-15 RX ADMIN — NITROGLYCERIN SCH IN: 20 OINTMENT TOPICAL at 05:00

## 2016-01-15 RX ADMIN — Medication SCH ML: at 09:23

## 2016-01-15 RX ADMIN — NITROGLYCERIN SCH IN: 20 OINTMENT TOPICAL at 17:20

## 2016-01-15 RX ADMIN — ENOXAPARIN SODIUM SCH MG: 40 INJECTION SUBCUTANEOUS at 09:23

## 2016-01-15 RX ADMIN — NITROGLYCERIN SCH IN: 20 OINTMENT TOPICAL at 09:24

## 2016-01-15 RX ADMIN — NITROGLYCERIN SCH IN: 20 OINTMENT TOPICAL at 21:35

## 2016-01-15 NOTE — CP.PCM.PN
Subjective





- Date & Time of Evaluation


Date of Evaluation: 01/15/16


Time of Evaluation: 11:00





- Subjective


Subjective: 


doing well


full consult dictated # [id number]


no complaints


cont present care


pending guardianship for placement


cont 1:1





Objective





- Vital Signs/Intake and Output


Vital Signs (last 24 hours): 


 











Temp Pulse Resp BP Pulse Ox


 


 99 F   70   20   115/65   97 


 


 01/15/16 08:43  01/15/16 09:22  01/15/16 08:43  01/15/16 09:22  01/15/16 08:43











- Medications


Medications: 


 Current Medications





Aspirin (Ecotrin)  81 mg PO DAILY [facility]


   Last Admin: 01/15/16 09:21 Dose:  81 mg


Enalapril Maleate (Vasotec)  10 mg PO DAILY [facility]


   Last Admin: 01/15/16 09:23 Dose:  10 mg


Enoxaparin Sodium (Lovenox)  40 mg SC DAILY [facility]


   Last Admin: 01/15/16 09:23 Dose:  40 mg


Famotidine (Pepcid)  20 mg GT BID [facility]


   Last Admin: 01/15/16 09:23 Dose:  20 mg


Haloperidol Lactate (Haldol)  2.5 mg IM Q6 PRN


   PRN Reason: Agitation


Levetiracetam (Keppra)  500 mg PO BID [facility]


   Last Admin: 01/15/16 09:21 Dose:  500 mg


Lorazepam (Ativan)  1 mg IVP Q4 PRN


   PRN Reason: Agitation


   Last Admin: 01/14/16 18:29 Dose:  1 mg


Metoprolol Tartrate (Lopressor)  100 mg PO Q12 [facility]


   Last Admin: 01/15/16 09:22 Dose:  100 mg


Multivitamins/Vitamin C (Multi-Delyn Liquid)  5 ml PO DAILY [facility]


   Last Admin: 01/15/16 09:23 Dose:  5 ml


Nitroglycerin (Nitro-Bid 2% Oint)  2 in TOP Q6 [facility]


   Last Admin: 01/15/16 09:24 Dose:  2 in


Quetiapine Fumarate (Seroquel)  25 mg PO DAILY@1700 [facility]


   Last Admin: 01/14/16 21:26 Dose:  25 mg











- Labs


Labs: 


 





 01/10/16 08:10 





 01/10/16 08:10 





 











PT  11.2 SECONDS (9.4-12.0)   12/14/15  05:20    


 


INR  1.05  (0.89-1.20)   12/14/15  05:20    


 


APTT  36.2 SECONDS (23.1-32.0)  H  12/14/15  05:20    














Assessment and Plan


(1) DVT prophylaxis


Status: Acute





(2) Scrotal edema


Status: Chronic





(3) History of seizure


Status: Chronic





(4) Diabetes mellitus with hyperglycemia


Status: Chronic





(5) Agitation


Status: Acute





(6) Aspiration pneumonia


Status: Acute





(7) CAD (coronary artery disease)


Status: Acute

## 2016-01-15 NOTE — CP.PCM.PN
Subjective





- Date & Time of Evaluation


Date of Evaluation: 01/15/16


Time of Evaluation: 17:00





- Subjective


Subjective: 


AFEB OFF ANTIBIOTICS


MAY NEED CARDIAC CATH  DEFIBRILLATOR ? 





Objective





- Vital Signs/Intake and Output


Vital Signs (last 24 hours): 


 











Temp Pulse Resp BP Pulse Ox


 


 99 F   70   20   115/65   97 


 


 01/15/16 08:43  01/15/16 09:22  01/15/16 08:43  01/15/16 09:22  01/15/16 08:43











- Medications


Medications: 


 Current Medications





Aspirin (Ecotrin)  81 mg PO DAILY [facility]


   Last Admin: 01/15/16 09:21 Dose:  81 mg


Enalapril Maleate (Vasotec)  10 mg PO DAILY [facility]


   Last Admin: 01/15/16 09:23 Dose:  10 mg


Enoxaparin Sodium (Lovenox)  40 mg SC DAILY [facility]


   Last Admin: 01/15/16 09:23 Dose:  40 mg


Famotidine (Pepcid)  20 mg GT BID [facility]


   Last Admin: 01/15/16 09:23 Dose:  20 mg


Haloperidol Lactate (Haldol)  2.5 mg IM Q6 PRN


   PRN Reason: Agitation


Levetiracetam (Keppra)  500 mg PO BID [facility]


   Last Admin: 01/15/16 09:21 Dose:  500 mg


Lorazepam (Ativan)  1 mg IVP Q4 PRN


   PRN Reason: Agitation


   Last Admin: 01/14/16 18:29 Dose:  1 mg


Metoprolol Tartrate (Lopressor)  100 mg PO Q12 [facility]


   Last Admin: 01/15/16 09:22 Dose:  100 mg


Multivitamins/Vitamin C (Multi-Delyn Liquid)  5 ml PO DAILY [facility]


   Last Admin: 01/15/16 09:23 Dose:  5 ml


Nitroglycerin (Nitro-Bid 2% Oint)  2 in TOP Q6 [facility]


   Last Admin: 01/15/16 09:24 Dose:  2 in


Quetiapine Fumarate (Seroquel)  25 mg PO DAILY@1700 [facility]


   Last Admin: 01/14/16 21:26 Dose:  25 mg











- Labs


Labs: 


 





 01/10/16 08:10 





 01/10/16 08:10 





 











PT  11.2 SECONDS (9.4-12.0)   12/14/15  05:20    


 


INR  1.05  (0.89-1.20)   12/14/15  05:20    


 


APTT  36.2 SECONDS (23.1-32.0)  H  12/14/15  05:20    














- Constitutional


Appears: Non-toxic, Cachectic, Chronically Ill





- Head Exam


Head Exam: NORMOCEPHALIC





- Eye Exam


Eye Exam: absent: Scleral icterus





- ENT Exam


ENT Exam: Mucous Membranes Dry





- Neck Exam


Neck Exam: absent: Lymphadenopathy





- Respiratory Exam


Respiratory Exam: Decreased Breath Sounds, Rhonchi





- Cardiovascular Exam


Cardiovascular Exam: REGULAR RHYTHM, +S1, +S2





- GI/Abdominal Exam


GI & Abdominal Exam: Distended, Soft.  absent: Tenderness





- Rectal Exam


Rectal Exam: Deferred





-  Exam


 Exam: NORMAL INSPECTION





- Extremities Exam


Extremities Exam: absent: Calf Tenderness, Pedal Edema





- Back Exam


Back Exam: absent: CVA tenderness (L), CVA tenderness (R)





- Neurological Exam


Neurological Exam: Alert, Awake, Oriented x3





- Psychiatric Exam


Psychiatric exam: Normal Mood





- Skin


Skin Exam: Dry





Assessment and Plan


(1) Agitation


Status: Acute





(2) Aspiration pneumonia


Status: Acute





(3) Diabetes mellitus with hyperglycemia


Status: Chronic





(4) History of seizure


Status: Chronic





(5) NSTEMI (non-ST elevated myocardial infarction)


Status: Acute

## 2016-01-16 RX ADMIN — Medication SCH ML: at 08:54

## 2016-01-16 RX ADMIN — ENOXAPARIN SODIUM SCH MG: 40 INJECTION SUBCUTANEOUS at 08:52

## 2016-01-16 RX ADMIN — NITROGLYCERIN SCH: 20 OINTMENT TOPICAL at 10:00

## 2016-01-16 RX ADMIN — NITROGLYCERIN SCH IN: 20 OINTMENT TOPICAL at 21:39

## 2016-01-16 RX ADMIN — NITROGLYCERIN SCH IN: 20 OINTMENT TOPICAL at 16:56

## 2016-01-16 RX ADMIN — NITROGLYCERIN SCH IN: 20 OINTMENT TOPICAL at 05:25

## 2016-01-16 NOTE — CP.PCM.PN
Subjective





- Date & Time of Evaluation


Date of Evaluation: 01/16/16


Time of Evaluation: 07:30





- Subjective


Subjective: 


doing well


full consult dictated #


no complaints


cont present care


pending guardianship for placement


cont 1:1





Objective





- Vital Signs/Intake and Output


Vital Signs (last 24 hours): 


 











Temp Pulse Resp BP Pulse Ox


 


 98.2 F   66   19   107/64   99 


 


 01/16/16 00:38  01/16/16 00:38  01/16/16 00:38  01/16/16 00:38  01/16/16 00:38











- Medications


Medications: 


 Current Medications





Aspirin (Ecotrin)  [facility]


   Last Admin: 01/15/16 09:21 Dose:  81 mg


Enalapril Maleate (Vasotec)  10 mg PO DAILY [facility]


   Last Admin: 01/15/16 09:23 Dose:  10 mg


Enoxaparin Sodium (Lovenox)  40 mg SC [facility]ILY [facility]


   Last Admin: 01/15/16 09:23 Dose:  40 mg


Famotidine (Pepcid)  20 mg GT BID [facility]


   Last Admin: 01/15/16 17:21 Dose:  20 mg


Haloperidol Lactate (Haldol)  2.5 mg IM Q6 PRN


   PRN Reason: Agitation


Levetiracetam (Keppra)  500 mg PO BID [facility]


   Last Admin: 01/15/16 17:19 Dose:  500 mg


Lorazepam (Ativan)  1 mg IVP Q4 PRN


   PRN Reason: Agitation


   Last Admin: 01/15/16 15:32 Dose:  1 mg


Metoprolol Tartrate (Lopressor)  100 mg PO Q12 [facility]


   Last Admin: 01/15/16 21:39 Dose:  100 mg


Multivitamins/Vitamin C (Multi-Delyn Liquid)  5 ml PO DAILY [facility]


   Last Admin: 01/15/16 09:23 Dose:  5 ml


Nitroglycerin (Nitro-Bid 2% Oint)  2 in TOP Q6 [facility]


   Last Admin: 01/16/16 05:25 Dose:  2 in


Quetiapine Fumarate (Seroquel)  25 mg PO DAILY@1700 [facility]


   Last Admin: 01/15/16 17:21 Dose:  25 mg











- Labs


Labs: 


 





 01/10/16 08:10 





 01/10/16 08:10 





 











PT  11.2 SECONDS (9.4-12.0)   12/14/15  05:20    


 


INR  1.05  (0.89-1.20)   12/14/15  05:20    


 


APTT  36.2 SECONDS (23.1-32.0)  H  12/14/15  05:20    














Assessment and Plan


(1) DVT prophylaxis


Status: Acute





(2) Scrotal edema


Status: Chronic





(3) History of seizure


Status: Chronic





(4) Diabetes mellitus with hyperglycemia


Status: Chronic





(5) Agitation


Status: Acute





(6) Aspiration pneumonia


Status: Acute





(7) CAD (coronary artery disease)


Status: Acute

## 2016-01-17 LAB
ALBUMIN SERPL-MCNC: 3.8 G/DL (ref 3.5–5)
ALBUMIN/GLOB SERPL: 0.9 {RATIO} (ref 1–2.1)
ALT SERPL-CCNC: 16 U/L (ref 21–72)
AST SERPL-CCNC: 19 U/L (ref 17–59)
BASOPHILS # BLD AUTO: 0 K/UL (ref 0–0.2)
BASOPHILS NFR BLD: 0.6 % (ref 0–2)
BUN SERPL-MCNC: 15 MG/DL (ref 9–20)
CALCIUM SERPL-MCNC: 9.7 MG/DL (ref 8.4–10.2)
EOSINOPHIL # BLD AUTO: 0.7 K/UL (ref 0–0.7)
EOSINOPHIL NFR BLD: 9.5 % (ref 0–4)
ERYTHROCYTE [DISTWIDTH] IN BLOOD BY AUTOMATED COUNT: 13.3 % (ref 11.5–14.5)
GFR NON-AFRICAN AMERICAN: > 60
HGB BLD-MCNC: 10.3 G/DL (ref 12–18)
LYMPHOCYTES # BLD AUTO: 1.9 K/UL (ref 1–4.3)
LYMPHOCYTES NFR BLD AUTO: 25.5 % (ref 20–40)
MCH RBC QN AUTO: 30.9 PG (ref 27–31)
MCHC RBC AUTO-ENTMCNC: 31.5 G/DL (ref 33–37)
MCV RBC AUTO: 98.2 FL (ref 80–94)
MONOCYTES # BLD: 0.7 K/UL (ref 0–0.8)
MONOCYTES NFR BLD: 9.2 % (ref 0–10)
NEUTROPHILS # BLD: 4.2 K/UL (ref 1.8–7)
NEUTROPHILS NFR BLD AUTO: 55.2 % (ref 50–75)
NRBC BLD AUTO-RTO: 0.1 % (ref 0–0)
PLATELET # BLD: 276 K/UL (ref 130–400)
PMV BLD AUTO: 7.4 FL (ref 7.2–11.7)
RBC # BLD AUTO: 3.32 MIL/UL (ref 4.4–5.9)
WBC # BLD AUTO: 7.6 K/UL (ref 4.8–10.8)

## 2016-01-17 RX ADMIN — NITROGLYCERIN SCH IN: 20 OINTMENT TOPICAL at 05:00

## 2016-01-17 RX ADMIN — NITROGLYCERIN SCH IN: 20 OINTMENT TOPICAL at 09:08

## 2016-01-17 RX ADMIN — NITROGLYCERIN SCH IN: 20 OINTMENT TOPICAL at 21:30

## 2016-01-17 RX ADMIN — NITROGLYCERIN SCH IN: 20 OINTMENT TOPICAL at 16:26

## 2016-01-17 RX ADMIN — ENOXAPARIN SODIUM SCH MG: 40 INJECTION SUBCUTANEOUS at 08:42

## 2016-01-17 RX ADMIN — Medication SCH ML: at 08:43

## 2016-01-17 NOTE — PN
DATE: 01/17/2016



The patient evaluated in bed.  Remains calm in the morning but gets agitated as 
the day goes on and 1:1 remains at bedside for patient and staff safety.  The 
patient remains without stress, without fever, chills, nausea or vomiting or 
diarrhea.  ID consult note from today appreciated.  Assessment of the patient 
is normal except for confusion and agitation at times.  



PHYSICAL EXAMINATION:

GENERAL:  Assessment of the patient alert and oriented to the year which has 
been patient's baseline, still stating that he feels good.  The patient is 
sleeping on and off without any distress.  

VITAL SIGNS:  Respirations even, unlabored.

LUNGS:  Clear in all fields.

HEART:  Regular rate and rhythm.  No murmur, rubs or gallops.

ABDOMEN:  Soft, nontender.  Bowel sounds in all quadrants.

EXTREMITIES:  Distal pulses noted.  Sensation is intact.  Capillary refill is 
brisk.  No calf tenderness.



DIAGNOSES:  Status post cardiac arrest, coronary artery disease, agitation, 
deep venous thrombosis prophylaxis.



PLAN:  Will continue all apparent treatments.  Will continue 1:1 for safety.  
Will continue SCD and ae hose for DVT prophylaxis.  Will continue ID followup.  
Will continue to pursue guardianship along with  and discharge 
patient to a safe, appropriate facility upon receiving guardianship.





__________________________________________

Fan SILVER







cc:   



DD: 01/17/2016 12:34:59  [id number]

TT: 01/17/2016 13:00:15

Job # [id number]

jn

MTDD

## 2016-01-17 NOTE — CP.PCM.PN
Subjective





- Date & Time of Evaluation


Date of Evaluation: 01/17/16


Time of Evaluation: 10:06





- Subjective


Subjective: 


doing well


full consult dictated # [id number]


no complaints


cont present care


pending guardianship for placement


cont 1:1





Objective





- Vital Signs/Intake and Output


Vital Signs (last 24 hours): 


 











Temp Pulse Resp BP Pulse Ox


 


 98 F   65   18   138/76   100 


 


 01/17/16 08:57  01/17/16 08:57  01/17/16 08:57  01/17/16 08:57  01/17/16 08:57











- Medications


Medications: 


 Current Medications





Aspirin (Ecotrin)  81 mg PO DAILY [facility]


   Last Admin: 01/17/16 08:42 Dose:  81 mg


Enalapril Maleate (Vasotec)  10 mg PO DAILY [facility]


   Last Admin: 01/17/16 08:43 Dose:  10 mg


Enoxaparin Sodium (Lovenox)  40 mg SC DAILY [facility]


   Last Admin: 01/17/16 08:42 Dose:  40 mg


Famotidine (Pepcid)  20 mg GT BID [facility]


   Last Admin: 01/17/16 08:43 Dose:  20 mg


Haloperidol Lactate (Haldol)  2.5 mg IM Q6 PRN


   PRN Reason: Agitation


   Last Admin: 01/16/16 15:26 Dose:  2.5 mg


Levetiracetam (Keppra)  500 mg PO BID [facility]


   Last Admin: 01/17/16 08:43 Dose:  500 mg


Lorazepam (Ativan)  1 mg IVP Q4 PRN


   PRN Reason: Agitation


   Last Admin: 01/16/16 21:17 Dose:  1 mg


Metoprolol Tartrate (Lopressor)  100 mg PO Q12 [facility]


   Last Admin: 01/16/16 21:18 Dose:  100 mg


Multivitamins/Vitamin C (Multi-Delyn Liquid)  5 ml PO DAILY [facility]


   Last Admin: 01/17/16 08:43 Dose:  5 ml


Nitroglycerin (Nitro-Bid 2% Oint)  2 in TOP Q6 [facility]


   Last Admin: 01/17/16 09:08 Dose:  2 in


Quetiapine Fumarate (Seroquel)  25 mg PO DAILY@1700 [facility]


   Last Admin: 01/16/16 16:57 Dose:  25 mg











- Labs


Labs: 


 





 01/17/16 06:30 





 01/17/16 06:30 





 











PT  11.2 SECONDS (9.4-12.0)   12/14/15  05:20    


 


INR  1.05  (0.89-1.20)   12/14/15  05:20    


 


APTT  36.2 SECONDS (23.1-32.0)  H  12/14/15  05:20    














Assessment and Plan


(1) DVT prophylaxis


Status: Acute





(2) Scrotal edema


Status: Chronic





(3) History of seizure


Status: Chronic





(4) Diabetes mellitus with hyperglycemia


Status: Chronic





(5) Agitation


Status: Acute





(6) Aspiration pneumonia


Status: Acute





(7) CAD (coronary artery disease)


Status: Acute

## 2016-01-17 NOTE — CP.PCM.PN
Subjective





- Date & Time of Evaluation


Date of Evaluation: 01/17/16


Time of Evaluation: 11:00





- Subjective


Subjective: 


no new complaints


denies fever





Objective





- Vital Signs/Intake and Output


Vital Signs (last 24 hours): 


 











Temp Pulse Resp BP Pulse Ox


 


 98 F   65   18   138/72   100 


 


 01/17/16 08:57  01/17/16 11:01  01/17/16 08:57  01/17/16 11:01  01/17/16 08:57











- Medications


Medications: 


 Current Medications





Aspirin (Ecotrin)  81 mg PO DAILY [facility]


   Last Admin: 01/17/16 08:42 Dose:  81 mg


Enalapril Maleate (Vasotec)  10 mg PO DAILY [facility]


   Last Admin: 01/17/16 08:43 Dose:  10 mg


Enoxaparin Sodium (Lovenox)  40 mg SC DAILY [facility]


   Last Admin: 01/17/16 08:42 Dose:  40 mg


Famotidine (Pepcid)  20 mg GT BID [facility]


   Last Admin: 01/17/16 08:43 Dose:  20 mg


Haloperidol Lactate (Haldol)  2.5 mg IM Q6 PRN


   PRN Reason: Agitation


   Last Admin: 01/16/16 15:26 Dose:  2.5 mg


Levetiracetam (Keppra)  500 mg PO BID [facility]


   Last Admin: 01/17/16 08:43 Dose:  500 mg


Lorazepam (Ativan)  1 mg IVP Q4 PRN


   PRN Reason: Agitation


   Last Admin: 01/16/16 21:17 Dose:  1 mg


Metoprolol Tartrate (Lopressor)  100 mg PO Q12 [facility]


   Last Admin: 01/17/16 11:01 Dose:  100 mg


Multivitamins/Vitamin C (Multi-Delyn Liquid)  5 ml PO DAILY [facility]


   Last Admin: 01/17/16 08:43 Dose:  5 ml


Nitroglycerin (Nitro-Bid 2% Oint)  2 in TOP Q6 [facility]


   Last Admin: 01/17/16 09:08 Dose:  2 in


Quetiapine Fumarate (Seroquel)  25 mg PO DAILY@1700 [facility]


   Last Admin: 01/16/16 16:57 Dose:  25 mg











- Labs


Labs: 


 





 01/17/16 06:30 





 01/17/16 06:30 





 











PT  11.2 SECONDS (9.4-12.0)   12/14/15  05:20    


 


INR  1.05  (0.89-1.20)   12/14/15  05:20    


 


APTT  36.2 SECONDS (23.1-32.0)  H  12/14/15  05:20    














- Constitutional


Appears: Non-toxic, Chronically Ill





- Head Exam


Head Exam: NORMOCEPHALIC





- Eye Exam


Eye Exam: absent: Scleral icterus





- ENT Exam


ENT Exam: Mucous Membranes Dry





- Neck Exam


Neck Exam: absent: Lymphadenopathy





- Respiratory Exam


Respiratory Exam: Decreased Breath Sounds, Clear to Ausculation Bilateral





- Cardiovascular Exam


Cardiovascular Exam: REGULAR RHYTHM, +S1, +S2





- GI/Abdominal Exam


GI & Abdominal Exam: Distended, Soft.  absent: Tenderness





- Rectal Exam


Rectal Exam: Deferred





-  Exam


 Exam: NORMAL INSPECTION





- Extremities Exam


Extremities Exam: absent: Pedal Edema, Tenderness





- Back Exam


Back Exam: absent: CVA tenderness (L), CVA tenderness (R), paraspinal tenderness





- Neurological Exam


Neurological Exam: Alert, Awake, CN II-XII Intact





Assessment and Plan


(1) Agitation


Status: Acute





(2) Aspiration pneumonia


Status: Acute





(3) Diabetes mellitus with hyperglycemia


Status: Chronic





(4) History of seizure


Status: Chronic





(5) NSTEMI (non-ST elevated myocardial infarction)


Status: Acute

## 2016-01-18 RX ADMIN — ENOXAPARIN SODIUM SCH MG: 40 INJECTION SUBCUTANEOUS at 08:52

## 2016-01-18 RX ADMIN — NITROGLYCERIN SCH IN: 20 OINTMENT TOPICAL at 09:06

## 2016-01-18 RX ADMIN — NITROGLYCERIN SCH: 20 OINTMENT TOPICAL at 22:50

## 2016-01-18 RX ADMIN — NITROGLYCERIN SCH: 20 OINTMENT TOPICAL at 04:00

## 2016-01-18 RX ADMIN — NITROGLYCERIN SCH IN: 20 OINTMENT TOPICAL at 16:06

## 2016-01-18 RX ADMIN — Medication SCH ML: at 08:52

## 2016-01-18 NOTE — CP.PCM.PN
Subjective





- Date & Time of Evaluation


Date of Evaluation: 01/18/16


Time of Evaluation: 08:22





- Subjective


Subjective: 


doing well


full consult dictated # [id number]


no complaints


cont present care


pending guardianship for placement


cont 1:1





Objective





- Vital Signs/Intake and Output


Vital Signs (last 24 hours): 


 











Temp Pulse Resp BP Pulse Ox


 


 97.5 F L  76   20   120/78   100 


 


 01/18/16 08:15  01/18/16 08:15  01/18/16 08:15  01/18/16 08:15  01/18/16 08:15











- Medications


Medications: 


 Current Medications





Aspirin (Ecotrin)  81 mg PO DAILY [facility]


   Last Admin: 01/17/16 08:42 Dose:  81 mg


Enalapril Maleate (Vasotec)  10 mg PO DAILY [facility]


   Last Admin: 01/17/16 08:43 Dose:  10 mg


Enoxaparin Sodium (Lovenox)  40 mg SC DAILY [facility]


   Last Admin: 01/17/16 08:42 Dose:  40 mg


Famotidine (Pepcid)  20 mg GT BID [facility]


   Last Admin: 01/17/16 16:26 Dose:  20 mg


Haloperidol Lactate (Haldol)  2.5 mg IM Q6 PRN


   PRN Reason: Agitation


   Last Admin: 01/17/16 17:46 Dose:  2.5 mg


Levetiracetam (Keppra)  500 mg PO BID [facility]


   Last Admin: 01/17/16 16:25 Dose:  500 mg


Lorazepam (Ativan)  1 mg IVP Q4 PRN


   PRN Reason: Agitation


   Last Admin: 01/17/16 20:33 Dose:  1 mg


Metoprolol Tartrate (Lopressor)  100 mg PO Q12 [facility]


   Last Admin: 01/17/16 20:39 Dose:  100 mg


Multivitamins/Vitamin C (Multi-Delyn Liquid)  5 ml PO DAILY [facility]


   Last Admin: 01/17/16 08:43 Dose:  5 ml


Nitroglycerin (Nitro-Bid 2% Oint)  2 in TOP Q6 [facility]


   Last Admin: 01/18/16 04:00 Dose:  Not Given


Quetiapine Fumarate (Seroquel)  25 mg PO DAILY@1700 [facility]


   Last Admin: 01/17/16 16:26 Dose:  25 mg











- Labs


Labs: 


 





 01/17/16 06:30 





 01/17/16 06:30 





 











PT  11.2 SECONDS (9.4-12.0)   12/14/15  05:20    


 


INR  1.05  (0.89-1.20)   12/14/15  05:20    


 


APTT  36.2 SECONDS (23.1-32.0)  H  12/14/15  05:20    














Assessment and Plan


(1) DVT prophylaxis


Status: Acute





(2) Scrotal edema


Status: Chronic





(3) History of seizure


Status: Chronic





(4) Diabetes mellitus with hyperglycemia


Status: Chronic





(5) Agitation


Status: Acute





(6) Aspiration pneumonia


Status: Acute





(7) CAD (coronary artery disease)


Status: Acute

## 2016-01-19 RX ADMIN — Medication SCH ML: at 10:17

## 2016-01-19 RX ADMIN — NITROGLYCERIN SCH IN: 20 OINTMENT TOPICAL at 10:29

## 2016-01-19 RX ADMIN — NITROGLYCERIN SCH: 20 OINTMENT TOPICAL at 22:35

## 2016-01-19 RX ADMIN — ENOXAPARIN SODIUM SCH MG: 40 INJECTION SUBCUTANEOUS at 13:00

## 2016-01-19 RX ADMIN — NITROGLYCERIN SCH IN: 20 OINTMENT TOPICAL at 18:14

## 2016-01-19 RX ADMIN — NITROGLYCERIN SCH IN: 20 OINTMENT TOPICAL at 04:32

## 2016-01-19 NOTE — PN
DATE: 01/19/2016



The patient evaluated in bed.  No distress.  No complaints.  The patient remains able to state year a
nd that he is feeling better, and that is about it.  Per 1:1 sitter, the patient sleeps then wakes up
, eats and goes back to sleep.  Nursing notes reviewed from yesterday demonstrate the patient had a p
eriod of agitation, which is necessitating this 1:1 to remain.  The patient is calm at present, but w
e will maintain 1:1 for patient and staff safety.  



REVIEW OF SYSTEMS:  Normal except for altered mental status.



PHYSICAL EXAMINATION:  

GENERAL:  Alert and able to state year.

LUNGS:  Clear to auscultation all fields.

HEART:  Regular rate and rhythm.  No murmurs, rubs or gallops.

ABDOMEN:  Soft, nontender.  Bowel sounds in all quadrants.

EXTREMITIES:  Distal pulses, motor and sensation intact.  Cap refill is brisk.  Negative Homans sign.




DIAGNOSES:   Include status post cardiac arrest, coronary artery disease, agitation, deep venous thro
mbosis prophylaxis.  



We will continue all medications for the patient.  Will continue to follow the patient and monitor fo
r safety for staff and patient.  Will continue 1:1 as indicated, which at this time it is as the navya
ent gets agitated and tries to leave the nursing unit, which is unsafe for the patient at this time a
s he has no insight into his clinical condition as per psychiatry.  Will continue to follow with soci
al services for guardianship and discharge to an appropriate facility when able to.





__________________________________________

Fan SILVER







cc:



DD: 01/19/2016 08:56:07  [id number]

TT: 01/19/2016 09:07:52

Job # [id number]

mn

## 2016-01-19 NOTE — PN
DATE: 01/15/2016



The patient was evaluated in bed, doing well and no complaints offered; 1:1 remains at bedside for sa weller.  The patient has been less agitated and has thus far not needed any further restraints for navya
ent and staff safety.  The patient remains pending guardianship for placement in subacute rehab.  The
 patient is able to state that he is comfortable and he is able to again state the year, but not havi
ng any further conversation besides that.



REVIEW OF SYSTEMS:  Normal except for altered mental status.  



PHYSICAL EXAMINATION:

GENERAL:  Alert and oriented to time.,

LUNGS:  Respirations even, unlabored.

HEART:  Sounds are normal rate, rhythm and without murmur, rubs or gallops.

LUNGS:  Clear to auscultation bilaterally.

ABDOMEN:  Soft, nontender with bowel sounds in all quadrants.  

NEUROLOGIC:  Distal pulses, motor sensation intact and cap refill is brisk.  Pupils:  PERRLA.



DIAGNOSES:  status post cardiac arrest, coronary artery disease, confusion, agitation, deep venous th
rombosis prophylaxis.  



PLAN:  We will continue all present medications.  We will continue 1:1 at bedside for patient's safet
y.  We will continue to follow up with  for discharge planning and guardianship for jessica bradford.  We will continue to monitor the patient for safety and improving cognitive status.  All consu
ltation notes have been appreciated.





__________________________________________

Fan SILVER







cc:



DD: 01/15/2016 14:11:17  [id number]

TT: 01/15/2016 14:35:03

Job # [id number]

tn

## 2016-01-19 NOTE — PN
DATE: 01/16/2016



The patient evaluated in bed, calm and cooperative with a 1:1 at bedside.  The patient still has mariela
ods of agitation which is necessitating the 1:1.  The patient no longer needs restraints for safety f
or self or staff members.  At present, patient offers no complaints.  He is able to state the year an
d that he is better which is his baseline.  



REVIEW OF SYSTEMS:  Normal except for decreased mental status.  This is unsure if it is acute or 
prashant.  



PHYSICAL EXAMINATION:

GENERAL:  The patient alert, oriented to year.  Ambulatory with assistance.

LUNGS:  Clear.  Respirations even and unlabored.

HEART:  Regular rate and rhythm, no murmurs, rubs or gallops.

ABDOMEN:  Soft, nontender.  Bowel sounds in all quadrants.  

EXTREMITIES:  Distal pulses are  present.  Motor sensation intact and cap refill is brisk.  Negative 
calf tenderness.



DIAGNOSES:  For this patient include status post cardiac arrest, coronary artery disease, agitation, 
deep venous thrombosis prophylaxis.  



PLAN:  We will continue all medication.  We will continue 1:1 for safety of patient and staff.  We wi
ll continue working with social work for discharge planning and guardianship.  I discussed at length 
with social work who states the patient is still in the process and it may be several weeks before gu
ardianship is obtained for this patient.  We will continue to monitor patient for agitation and recon
sult psych as needed.  ID followup has been appreciated.





__________________________________________

Fan SILVER







cc:



DD: 01/16/2016 10:01:31  1505

TT: 01/16/2016 10:20:57

Job # [id number]

tn

## 2016-01-19 NOTE — PN
DATE: 01/18/2016



The patient was evaluated in bed.  The 1:1 remains at bedside for patient's safety.  The patient is c
martin and cooperative, but remains having periods of agitation necessitating in the 1:1 monitoring.  Th
e patient has required in the last 24 hours a dose of Ativan for agitation to maintain safety.  At pr
esent, patient is sleeping, easily arousable.  Respirations even, unlabored.  Able to state the year 
and that he is feeling good which is the patient's baseline.  



RN alerted this provider that patient had 2 or 3 drops of blood on gown in genital area.  The patient
 has a history during this encounter of scrotal edema.



REVIEW OF SYSTEMS:  Normal except as described.



PHYSICAL EXAMINATION:  

GENERAL:  Alert and oriented to time.

LUNGS:  Clear to auscultation in all fields.

HEART:  Regular rate and rhythm.  No murmurs, rubs or gallops.

ABDOMEN:  Soft, nontender.  Bowel sounds in all quadrants.  

EXTREMITIES:  Distal pulses.  Motor sensation intact.  No calf tenderness.  Cap refill brisk.



DIAGNOSIS:  Status post cardiac arrest, coronary artery disease, agitation, deep venous thrombosis pr
ophylaxis, scrotal edema with possible hematuria.  



PLAN:  Urology consult called.  We will further workup pending urology consult.  The patient remains 
on Lovenox, SCDs and AE hose for DVT prophylaxis.  Continue all current medications.  Will continue t
o follow with social work for guardianship and placement in the appropriate facility.  Will maintain 
on 1:1 with Ativan p.r.n. for agitation to maintain patient and staff safety.





__________________________________________

Fan SILVER







cc:



DD: 01/18/2016 08:23:25  1505

TT: 01/18/2016 08:44:05

Job # [id number]

mn

## 2016-01-19 NOTE — CP.PCM.PN
Subjective





- Date & Time of Evaluation


Date of Evaluation: 01/19/16


Time of Evaluation: 08:29





- Subjective


Subjective: 


dictation #[id number]


calm at present w/ periods of agitation


1:1 remains


ativan /haldol prn





Objective





- Vital Signs/Intake and Output


Vital Signs (last 24 hours): 


 











Temp Pulse Resp BP Pulse Ox


 


 99 F   75   20   104/66   92 L


 


 01/18/16 16:29  01/18/16 20:46  01/18/16 16:29  01/18/16 20:46  01/18/16 16:29











- Medications


Medications: 


 Current Medications





Aspirin (Ecotrin)  81 mg PO DAILY [facility]


   Last Admin: 01/18/16 08:51 Dose:  81 mg


Enalapril Maleate (Vasotec)  10 mg PO DAILY [facility]


   Last Admin: 01/18/16 08:53 Dose:  10 mg


Enoxaparin Sodium (Lovenox)  40 mg SC DAILY [facility]


   Last Admin: 01/18/16 08:52 Dose:  40 mg


Famotidine (Pepcid)  20 mg GT BID [facility]


   Last Admin: 01/18/16 16:10 Dose:  20 mg


Haloperidol Lactate (Haldol)  2.5 mg IM Q6 PRN


   PRN Reason: Agitation


   Last Admin: 01/18/16 12:53 Dose:  2.5 mg


Levetiracetam (Keppra)  500 mg PO BID [facility]


   Last Admin: 01/18/16 16:06 Dose:  500 mg


Lorazepam (Ativan)  1 mg IVP Q4 PRN


   PRN Reason: Agitation


   Last Admin: 01/19/16 00:16 Dose:  1 mg


Metoprolol Tartrate (Lopressor)  100 mg PO Q12 [facility]


   Last Admin: 01/18/16 20:46 Dose:  100 mg


Multivitamins/Vitamin C (Multi-Delyn Liquid)  5 ml PO DAILY [facility]


   Last Admin: 01/18/16 08:52 Dose:  5 ml


Nitroglycerin (Nitro-Bid 2% Oint)  2 in TOP Q6 [facility]


   Last Admin: 01/19/16 04:32 Dose:  2 in


Quetiapine Fumarate (Seroquel)  25 mg PO DAILY@1700 [facility]


   Last Admin: 01/18/16 16:07 Dose:  25 mg











- Labs


Labs: 


 





 01/17/16 06:30 





 01/17/16 06:30 





 











PT  11.2 SECONDS (9.4-12.0)   12/14/15  05:20    


 


INR  1.05  (0.89-1.20)   12/14/15  05:20    


 


APTT  36.2 SECONDS (23.1-32.0)  H  12/14/15  05:20    














Assessment and Plan


(1) DVT prophylaxis


Status: Acute





(2) Scrotal edema


Status: Chronic





(3) History of seizure


Status: Chronic





(4) Diabetes mellitus with hyperglycemia


Status: Chronic





(5) Agitation


Status: Acute





(6) Aspiration pneumonia


Status: Acute





(7) CAD (coronary artery disease)


Status: Acute

## 2016-01-20 RX ADMIN — NITROGLYCERIN SCH: 20 OINTMENT TOPICAL at 04:17

## 2016-01-20 RX ADMIN — NITROGLYCERIN SCH: 20 OINTMENT TOPICAL at 10:00

## 2016-01-20 RX ADMIN — NITROGLYCERIN SCH IN: 20 OINTMENT TOPICAL at 21:05

## 2016-01-20 RX ADMIN — Medication SCH ML: at 09:50

## 2016-01-20 RX ADMIN — NITROGLYCERIN SCH IN: 20 OINTMENT TOPICAL at 17:13

## 2016-01-20 RX ADMIN — ENOXAPARIN SODIUM SCH MG: 40 INJECTION SUBCUTANEOUS at 09:50

## 2016-01-20 RX ADMIN — NITROGLYCERIN SCH IN: 20 OINTMENT TOPICAL at 21:53

## 2016-01-20 NOTE — CP.PCM.PN
Subjective





- Date & Time of Evaluation


Date of Evaluation: 01/20/16


Time of Evaluation: 07:56





- Subjective


Subjective: 


calm at present w/ periods of agitation


no further blood from penis


remains as assess. no complaints offered. sleeping w/ 1:1 at bedside





Review of Systems





- Review of Systems


Systems not reviewed;Unavailable: Altered Mental Status





- Constitutional


Constitutional: UN





- EENT


Eyes: UNREMARKABLE


Ears: UNREMARKABLE


Nose/Mouth/Throat: UNREMARKABLE





- Cardiovascular


Cardiovascular: UNREMARKABLE





- Respiratory


Respiratory: UNREMARKABLE





- Gastrointestinal


Gastrointestinal: UNREMARKABLE





- Genitourinary


Genitourinary: UNREMARKABLE





- Reproductive: Male


Reproductive:Male: UNREMARKABLE





- Musculoskeletal


Musculoskeletal: UNREMARKABLE





- Integumentary


Integumentary: UNREMARKABLE





- Neurological


Neurological: As Per HPI, Confusion





- Psychiatric


Psychiatric: UNREMARKABLE





- Endocrine


Endocrine: UNREMARKABLE





- Hematologic/Lymphatic


Hematologic: UNREMARKABLE





Objective





- Vital Signs/Intake and Output


Vital Signs (last 24 hours): 


 











Temp Pulse Resp BP Pulse Ox


 


 97.5 F L  69   18   100/57 L  99 


 


 01/20/16 00:24  01/20/16 04:26  01/20/16 00:24  01/20/16 04:26  01/20/16 00:24











- Medications


Medications: 


 Current Medications





Aspirin (Ecotrin)  81 mg PO DAILY [facility]


   Last Admin: 01/19/16 10:15 Dose:  81 mg


Enalapril Maleate (Vasotec)  10 mg PO DAILY [facility]


   Last Admin: 01/19/16 13:16 Dose:  10 mg


Enoxaparin Sodium (Lovenox)  40 mg SC DAILY [facility]


   Last Admin: 01/19/16 13:00 Dose:  40 mg


Famotidine (Pepcid)  20 mg GT BID [facility]


   Last Admin: 01/19/16 18:14 Dose:  20 mg


Haloperidol Lactate (Haldol)  2.5 mg IM Q6 PRN


   PRN Reason: Agitation


   Last Admin: 01/18/16 12:53 Dose:  2.5 mg


Levetiracetam (Keppra)  500 mg PO BID [facility]


   Last Admin: 01/19/16 18:14 Dose:  500 mg


Lorazepam (Ativan)  1 mg IVP Q4 PRN


   PRN Reason: Agitation


   Last Admin: 01/19/16 13:21 Dose:  1 mg


Metoprolol Tartrate (Lopressor)  100 mg PO Q12 [facility]


   Last Admin: 01/19/16 22:08 Dose:  100 mg


Multivitamins/Vitamin C (Multi-Delyn Liquid)  5 ml PO DAILY [facility]


   Last Admin: 01/19/16 10:17 Dose:  5 ml


Nitroglycerin (Nitro-Bid 2% Oint)  2 in TOP Q6 [facility]


   Last Admin: 01/20/16 04:17 Dose:  Not Given


Quetiapine Fumarate (Seroquel)  25 mg PO DAILY@1700 [facility]


   Last Admin: 01/19/16 18:14 Dose:  25 mg











- Labs


Labs: 


 





 01/17/16 06:30 





 01/17/16 06:30 





 











PT  11.2 SECONDS (9.4-12.0)   12/14/15  05:20    


 


INR  1.05  (0.89-1.20)   12/14/15  05:20    


 


APTT  36.2 SECONDS (23.1-32.0)  H  12/14/15  05:20    














- Constitutional


Appears: Well, Non-toxic, No Acute Distress





- Head Exam


Head Exam: ATRAUMATIC, NORMAL INSPECTION, NORMOCEPHALIC





- Eye Exam


Eye Exam: EOMI, PERRL





- Respiratory Exam


Respiratory Exam: Clear to Ausculation Bilateral, NORMAL BREATHING PATTERN





- Cardiovascular Exam


Cardiovascular Exam: REGULAR RHYTHM, RRR





- GI/Abdominal Exam


GI & Abdominal Exam: Soft, Normal Bowel Sounds





- Extremities Exam


Extremities Exam: Full ROM, Normal Capillary Refill, Normal Inspection





- Back Exam


Back Exam: NORMAL INSPECTION





- Neurological Exam


Neurological Exam: Alert, Awake, CN II-XII Intact, Normal Gait


Additional comments: 


oriented to person, year





- Psychiatric Exam


Psychiatric exam: Normal Affect, Normal Mood





- Skin


Skin Exam: Dry, Intact, Normal Color, Warm





Assessment and Plan


(1) DVT prophylaxis


Assessment & Plan: 


scd and ae hose, lovenox


Status: Acute





(2) Scrotal edema


Assessment & Plan: 


pending uro f/u


no further blood in urine


Status: Chronic





(3) History of seizure


Assessment & Plan: 


keppra, neuro f/u


Status: Chronic





(4) Diabetes mellitus with hyperglycemia


Assessment & Plan: 


fsbg- been controlled


Status: Chronic





(5) Agitation


Assessment & Plan: 


1:1 for safety


haldol/ativan prn


Status: Acute





(6) Aspiration pneumonia


Assessment & Plan: 


resolved


ID f/u


Status: Acute





(7) CAD (coronary artery disease)


Assessment & Plan: 


cont meds


wonitor for any problems


Status: Acute

## 2016-01-21 RX ADMIN — Medication SCH ML: at 10:12

## 2016-01-21 RX ADMIN — NITROGLYCERIN SCH IN: 20 OINTMENT TOPICAL at 10:12

## 2016-01-21 RX ADMIN — ENOXAPARIN SODIUM SCH MG: 40 INJECTION SUBCUTANEOUS at 10:11

## 2016-01-21 RX ADMIN — NITROGLYCERIN SCH IN: 20 OINTMENT TOPICAL at 22:11

## 2016-01-21 RX ADMIN — NITROGLYCERIN SCH IN: 20 OINTMENT TOPICAL at 04:20

## 2016-01-21 RX ADMIN — NITROGLYCERIN SCH IN: 20 OINTMENT TOPICAL at 21:05

## 2016-01-21 RX ADMIN — NITROGLYCERIN SCH IN: 20 OINTMENT TOPICAL at 16:00

## 2016-01-21 NOTE — CP.PCM.PN
Subjective





- Date & Time of Evaluation


Date of Evaluation: 01/21/16


Time of Evaluation: 07:00





- Subjective


Subjective: 


pt remains in bed somnolent w/ periods of agitation.  pt still requires 1:1 for 

safety.  no compalitns offered. d/c w/ ss about guardianship/placement.





Review of Systems





- Review of Systems


Systems not reviewed;Unavailable: Altered Mental Status





- Constitutional


Constitutional: Malaise





- EENT


Eyes: UNREMARKABLE


Ears: UNREMARKABLE


Nose/Mouth/Throat: UNREMARKABLE





- Cardiovascular


Cardiovascular: UNREMARKABLE





- Respiratory


Respiratory: UNREMARKABLE





- Gastrointestinal


Gastrointestinal: UNREMARKABLE





- Genitourinary


Genitourinary: UNREMARKABLE





- Reproductive: Male


Reproductive:Male: UNREMARKABLE





- Musculoskeletal


Musculoskeletal: UNREMARKABLE





- Integumentary


Integumentary: UNREMARKABLE





- Neurological


Neurological: UNREMARKABLE





- Psychiatric


Psychiatric: UNREMARKABLE





- Endocrine


Endocrine: UNREMARKABLE





- Hematologic/Lymphatic


Hematologic: UNREMARKABLE





Objective





- Vital Signs/Intake and Output


Vital Signs (last 24 hours): 


 











Temp Pulse Resp BP Pulse Ox


 


 97.5 F L  61   20   110/63   94 L


 


 01/21/16 08:12  01/21/16 08:12  01/21/16 08:12  01/21/16 08:12  01/21/16 08:12











- Medications


Medications: 


 Current Medications





Aspirin (Ecotrin)  [facility]ILY [facility]


   Last Admin: 01/20/16 09:50 Dose:  81 mg


Enalapril Maleate (Vasotec)  10 mg PO DAILY [facility]


   Last Admin: 01/20/16 09:51 Dose:  Not Given


Enoxaparin Sodium (Lovenox)  40 mg SC [facility]ILY [facility]


   Last Admin: 01/20/16 09:50 Dose:  40 mg


Famotidine (Pepcid)  20 mg GT BID [facility]


   Last Admin: 01/20/16 17:13 Dose:  20 mg


Haloperidol Lactate (Haldol)  2.5 mg IM Q6 PRN


   PRN Reason: Agitation


   Last Admin: 01/18/16 12:53 Dose:  2.5 mg


Levetiracetam (Keppra)  500 mg PO BID [facility]


   Last Admin: 01/20/16 17:13 Dose:  500 mg


Lorazepam (Ativan)  1 mg IVP Q4 PRN


   PRN Reason: Agitation


   Last Admin: 01/20/16 13:07 Dose:  1 mg


Metoprolol Tartrate (Lopressor)  100 mg PO Q12 [facility]


   Last Admin: 01/20/16 21:04 Dose:  100 mg


Multivitamins/Vitamin C (Multi-Delyn Liquid)  5 ml PO DAILY [facility]


   Last Admin: 01/20/16 09:50 Dose:  5 ml


Nitroglycerin (Nitro-Bid 2% Oint)  2 in TOP Q6 [facility]


   Last Admin: 01/21/16 04:20 Dose:  2 in


Quetiapine Fumarate (Seroquel)  25 mg PO DAILY@1700 [facility]


   Last Admin: 01/20/16 17:13 Dose:  25 mg











- Labs


Labs: 


 





 01/17/16 06:30 





 01/17/16 06:30 





 











PT  11.2 SECONDS (9.4-12.0)   12/14/15  05:20    


 


INR  1.05  (0.89-1.20)   12/14/15  05:20    


 


APTT  36.2 SECONDS (23.1-32.0)  H  12/14/15  05:20    














- Constitutional


Appears: Non-toxic, No Acute Distress, Chronically Ill





- Head Exam


Head Exam: ATRAUMATIC, NORMAL INSPECTION, NORMOCEPHALIC





- Eye Exam


Eye Exam: EOMI, Normal appearance, PERRL





- Respiratory Exam


Respiratory Exam: Clear to Ausculation Bilateral, NORMAL BREATHING PATTERN





- Cardiovascular Exam


Cardiovascular Exam: REGULAR RHYTHM, RRR





- GI/Abdominal Exam


GI & Abdominal Exam: Soft, Normal Bowel Sounds





- Extremities Exam


Extremities Exam: Full ROM, Normal Capillary Refill, Normal Inspection





- Neurological Exam


Neurological Exam: Abnormal Gait, Alert, Awake, CN II-XII Intact





- Psychiatric Exam


Psychiatric exam: Normal Affect, Normal Mood





- Skin


Skin Exam: Dry, Intact, Normal Color, Warm





Assessment and Plan


(1) DVT prophylaxis


Assessment & Plan: 


scd and aehose, lovenox


Status: Acute





(2) Scrotal edema


Assessment & Plan: 


pending uro consult


Status: Chronic





(3) History of seizure


Assessment & Plan: 


keppra


Status: Chronic





(4) Diabetes mellitus with hyperglycemia


Assessment & Plan: 


fsbg


Status: Chronic





(5) Agitation


Assessment & Plan: 


haldol/ativan prn


1:1 for safety


Status: Acute





(6) Aspiration pneumonia


Assessment & Plan: 


anbx


ID f/u


Status: Acute





(7) CAD (coronary artery disease)


Assessment & Plan: 


cont meds


monitor for complications


Status: Acute








- Assessment and Plan (Free Text)


Assessment: 


pending guardianship for placement/discharge planning

## 2016-01-22 RX ADMIN — ENOXAPARIN SODIUM SCH MG: 40 INJECTION SUBCUTANEOUS at 09:58

## 2016-01-22 RX ADMIN — NITROGLYCERIN SCH IN: 20 OINTMENT TOPICAL at 17:11

## 2016-01-22 RX ADMIN — Medication SCH ML: at 09:59

## 2016-01-22 RX ADMIN — NITROGLYCERIN SCH IN: 20 OINTMENT TOPICAL at 09:59

## 2016-01-22 RX ADMIN — NITROGLYCERIN SCH IN: 20 OINTMENT TOPICAL at 21:54

## 2016-01-22 RX ADMIN — NITROGLYCERIN SCH IN: 20 OINTMENT TOPICAL at 04:00

## 2016-01-22 NOTE — CP.PCM.PN
Subjective





- Date & Time of Evaluation


Date of Evaluation: 01/22/16


Time of Evaluation: 12:00





- Subjective


Subjective: 


afeb off antibiotics 





Objective





- Vital Signs/Intake and Output


Vital Signs (last 24 hours): 


 











Temp Pulse Resp BP Pulse Ox


 


 97.7 F   60   20   123/73   98 


 


 01/22/16 08:22  01/22/16 09:57  01/22/16 08:22  01/22/16 09:57  01/22/16 08:22











- Medications


Medications: 


 Current Medications





Aspirin (Ecotrin)  81 mg PO DAILY [facility]


   Last Admin: 01/22/16 09:57 Dose:  81 mg


Enalapril Maleate (Vasotec)  10 mg PO DAILY [facility]


   Last Admin: 01/22/16 10:00 Dose:  10 mg


Enoxaparin Sodium (Lovenox)  40 mg SC DAILY [facility]


   Last Admin: 01/22/16 09:58 Dose:  40 mg


Famotidine (Pepcid)  20 mg GT BID [facility]


   Last Admin: 01/22/16 10:00 Dose:  20 mg


Haloperidol Lactate (Haldol)  2.5 mg IM Q6 PRN


   PRN Reason: Agitation


   Last Admin: 01/18/16 12:53 Dose:  2.5 mg


Levetiracetam (Keppra)  500 mg PO BID [facility]


   Last Admin: 01/22/16 09:57 Dose:  500 mg


Lorazepam (Ativan)  1 mg IVP Q4 PRN


   PRN Reason: Agitation


   Last Admin: 01/20/16 13:07 Dose:  1 mg


Metoprolol Tartrate (Lopressor)  100 mg PO Q12 [facility]


   Last Admin: 01/22/16 09:57 Dose:  100 mg


Multivitamins/Vitamin C (Multi-Delyn Liquid)  5 ml PO DAILY [facility]


   Last Admin: 01/22/16 09:59 Dose:  5 ml


Nitroglycerin (Nitro-Bid 2% Oint)  2 in TOP Q6 [facility]


   Last Admin: 01/22/16 09:59 Dose:  2 in


Quetiapine Fumarate (Seroquel)  25 mg PO DAILY@1700 [facility]


   Last Admin: 01/21/16 15:59 Dose:  25 mg











- Labs


Labs: 


 





 01/17/16 06:30 





 01/17/16 06:30 





 











PT  11.2 SECONDS (9.4-12.0)   12/14/15  05:20    


 


INR  1.05  (0.89-1.20)   12/14/15  05:20    


 


APTT  36.2 SECONDS (23.1-32.0)  H  12/14/15  05:20    














- Constitutional


Appears: Non-toxic, Chronically Ill





- Head Exam


Head Exam: NORMOCEPHALIC





- Eye Exam


Eye Exam: absent: Scleral icterus





- ENT Exam


ENT Exam: Mucous Membranes Dry





- Neck Exam


Neck Exam: absent: Lymphadenopathy





- Respiratory Exam


Respiratory Exam: Decreased Breath Sounds, Clear to Ausculation Bilateral





- Cardiovascular Exam


Cardiovascular Exam: REGULAR RHYTHM, +S1, +S2





- GI/Abdominal Exam


GI & Abdominal Exam: Soft.  absent: Tenderness





- Rectal Exam


Rectal Exam: Deferred





- Extremities Exam


Extremities Exam: absent: Pedal Edema





- Back Exam


Back Exam: absent: CVA tenderness (L), CVA tenderness (R)





- Neurological Exam


Neurological Exam: Alert, Awake





Assessment and Plan


(1) Agitation


Status: Acute





(2) Aspiration pneumonia


Status: Acute





(3) Diabetes mellitus with hyperglycemia


Status: Chronic





(4) History of seizure


Status: Chronic





(5) NSTEMI (non-ST elevated myocardial infarction)


Status: Acute

## 2016-01-22 NOTE — CP.PCM.PN
Subjective





- Date & Time of Evaluation


Date of Evaluation: 01/22/16


Time of Evaluation: 11:21





- Subjective


Subjective: 


pt comfortable in bed, periods of agitation remain, no distress. no physical 

complaints.  pending guardianship





Review of Systems





- Review of Systems


Review of Systems: 


states yr and that he feels good


periods of aggression





- Constitutional


Constitutional: UN





- EENT


Eyes: UNREMARKABLE


Ears: UNREMARKABLE


Nose/Mouth/Throat: UNREMARKABLE





- Cardiovascular


Cardiovascular: UNREMARKABLE





- Respiratory


Respiratory: UNREMARKABLE





- Gastrointestinal


Gastrointestinal: UNREMARKABLE





- Genitourinary


Genitourinary: UNREMARKABLE





- Reproductive: Male


Reproductive:Male: UNREMARKABLE





- Musculoskeletal


Musculoskeletal: UNREMARKABLE





- Integumentary


Integumentary: UNREMARKABLE





- Neurological


Neurological: UNREMARKABLE





- Psychiatric


Psychiatric: UNREMARKABLE





- Endocrine


Endocrine: UNREMARKABLE





- Hematologic/Lymphatic


Hematologic: UNREMARKABLE





Objective





- Vital Signs/Intake and Output


Vital Signs (last 24 hours): 


 











Temp Pulse Resp BP Pulse Ox


 


 97.7 F   60   20   123/73   98 


 


 01/22/16 08:22  01/22/16 09:57  01/22/16 08:22  01/22/16 09:57  01/22/16 08:22











- Medications


Medications: 


 Current Medications





Aspirin (Ecotrin)  81 mg PO DAILY [facility]


   Last Admin: 01/22/16 09:57 Dose:  81 mg


Enalapril Maleate (Vasotec)  10 mg PO DAILY [facility]


   Last Admin: 01/22/16 10:00 Dose:  10 mg


Enoxaparin Sodium (Lovenox)  40 mg SC DAILY [facility]


   Last Admin: 01/22/16 09:58 Dose:  40 mg


Famotidine (Pepcid)  20 mg GT BID [facility]


   Last Admin: 01/22/16 10:00 Dose:  20 mg


Haloperidol Lactate (Haldol)  2.5 mg IM Q6 PRN


   PRN Reason: Agitation


   Last Admin: 01/18/16 12:53 Dose:  2.5 mg


Levetiracetam (Keppra)  500 mg PO BID [facility]


   Last Admin: 01/22/16 09:57 Dose:  500 mg


Lorazepam (Ativan)  1 mg IVP Q4 PRN


   PRN Reason: Agitation


   Last Admin: 01/20/16 13:07 Dose:  1 mg


Metoprolol Tartrate (Lopressor)  100 mg PO Q12 [facility]


   Last Admin: 01/22/16 09:57 Dose:  100 mg


Multivitamins/Vitamin C (Multi-Delyn Liquid)  5 ml PO DAILY [facility]


   Last Admin: 01/22/16 09:59 Dose:  5 ml


Nitroglycerin (Nitro-Bid 2% Oint)  2 in TOP Q6 [facility]


   Last Admin: 01/22/16 09:59 Dose:  2 in


Quetiapine Fumarate (Seroquel)  25 mg PO DAILY@1700 [facility]


   Last Admin: 01/21/16 15:59 Dose:  25 mg











- Labs


Labs: 


 





 01/17/16 06:30 





 01/17/16 06:30 





 











PT  11.2 SECONDS (9.4-12.0)   12/14/15  05:20    


 


INR  1.05  (0.89-1.20)   12/14/15  05:20    


 


APTT  36.2 SECONDS (23.1-32.0)  H  12/14/15  05:20    














- Constitutional


Appears: Well, Non-toxic, Chronically Ill





- Head Exam


Head Exam: ATRAUMATIC, NORMAL INSPECTION, NORMOCEPHALIC





- Eye Exam


Eye Exam: EOMI, Normal appearance, PERRL


Pupil Exam: NORMAL ACCOMODATION, PERRL





- ENT Exam


ENT Exam: Mucous Membranes Moist, Normal Exam





- Neck Exam


Neck Exam: Full ROM, Normal Inspection.  absent: Lymphadenopathy





- Respiratory Exam


Respiratory Exam: Clear to Ausculation Bilateral, NORMAL BREATHING PATTERN





- Cardiovascular Exam


Cardiovascular Exam: REGULAR RHYTHM, +S1, +S2.  absent: Murmur





- GI/Abdominal Exam


GI & Abdominal Exam: Soft, Normal Bowel Sounds.  absent: Tenderness





- Extremities Exam


Extremities Exam: Full ROM, Normal Capillary Refill, Normal Inspection.  absent

: Joint Swelling, Pedal Edema





- Back Exam


Back Exam: NORMAL INSPECTION





- Neurological Exam


Neurological Exam: Abnormal Gait, Alert, Awake, CN II-XII Intact, Oriented x3





- Psychiatric Exam


Psychiatric exam: Normal Affect, Normal Mood





- Skin


Skin Exam: Dry, Intact, Normal Color, Warm





Assessment and Plan


(1) DVT prophylaxis


Assessment & Plan: 


scd and ae hose, lovenox


Status: Acute





(2) Scrotal edema


Assessment & Plan: 


will monitor


Status: Chronic





(3) Diabetes mellitus with hyperglycemia


Assessment & Plan: 


fsbg, doing well


Status: Chronic





(4) Agitation


Assessment & Plan: 


remains on 1:1 for safety


ativan, haldol, seroquel


Status: Acute





(5) Aspiration pneumonia


Assessment & Plan: 


resolved


ID f/u


Status: Resolved





(6) CAD (coronary artery disease)


Assessment & Plan: 


cont meds


monitor


Status: Acute

## 2016-01-23 RX ADMIN — NITROGLYCERIN SCH IN: 20 OINTMENT TOPICAL at 21:44

## 2016-01-23 RX ADMIN — NITROGLYCERIN SCH IN: 20 OINTMENT TOPICAL at 15:59

## 2016-01-23 RX ADMIN — NITROGLYCERIN SCH IN: 20 OINTMENT TOPICAL at 09:05

## 2016-01-23 RX ADMIN — NITROGLYCERIN SCH IN: 20 OINTMENT TOPICAL at 04:56

## 2016-01-23 RX ADMIN — Medication SCH ML: at 09:04

## 2016-01-23 RX ADMIN — ENOXAPARIN SODIUM SCH MG: 40 INJECTION SUBCUTANEOUS at 09:04

## 2016-01-23 NOTE — CP.PCM.PN
Subjective





- Date & Time of Evaluation


Date of Evaluation: 01/23/16


Time of Evaluation: 08:27





- Subjective


Subjective: 


pt comfortable in bed


periodsof agitation in afternoons which requires 1:1 ativan, haldol


pending guardianship


pending placement


no distress, 1:1 at bedside





Review of Systems





- Constitutional


Constitutional: UN





- EENT


Eyes: UNREMARKABLE


Ears: UNREMARKABLE


Nose/Mouth/Throat: UNREMARKABLE





- Cardiovascular


Cardiovascular: UNREMARKABLE





- Respiratory


Respiratory: UNREMARKABLE





- Gastrointestinal


Gastrointestinal: UNREMARKABLE





- Genitourinary


Genitourinary: UNREMARKABLE





- Reproductive: Male


Reproductive:Male: UNREMARKABLE





- Musculoskeletal


Musculoskeletal: UNREMARKABLE





- Integumentary


Integumentary: UNREMARKABLE





- Neurological


Neurological: UNREMARKABLE





- Psychiatric


Psychiatric: UNREMARKABLE





- Endocrine


Endocrine: UNREMARKABLE





- Hematologic/Lymphatic


Hematologic: UNREMARKABLE





Objective





- Vital Signs/Intake and Output


Vital Signs (last 24 hours): 


 











Temp Pulse Resp BP Pulse Ox


 


 97.2 F L  76   20   137/73   99 


 


 01/23/16 07:37  01/23/16 07:37  01/23/16 07:37  01/23/16 07:37  01/23/16 07:37











- Medications


Medications: 


 Current Medications





Aspirin (Ecotrin)  81 mg PO DAILY [facility]


   Last Admin: 01/22/16 09:57 Dose:  81 mg


Enalapril Maleate (Vasotec)  10 mg PO DAILY [facility]


   Last Admin: 01/22/16 10:00 Dose:  10 mg


Enoxaparin Sodium (Lovenox)  40 mg SC DAILY [facility]


   Last Admin: 01/22/16 09:58 Dose:  40 mg


Famotidine (Pepcid)  20 mg GT BID [facility]


   Last Admin: 01/22/16 17:13 Dose:  20 mg


Haloperidol Lactate (Haldol)  2.5 mg IM Q6 PRN


   PRN Reason: Agitation


   Last Admin: 01/23/16 01:15 Dose:  2.5 mg


Levetiracetam (Keppra)  500 mg PO BID [facility]


   Last Admin: 01/22/16 17:12 Dose:  500 mg


Lorazepam (Ativan)  1 mg IVP Q4 PRN


   PRN Reason: Agitation


   Last Admin: 01/22/16 19:40 Dose:  1 mg


Metoprolol Tartrate (Lopressor)  100 mg PO Q12 [facility]


   Last Admin: 01/22/16 20:44 Dose:  100 mg


Multivitamins/Vitamin C (Multi-Delyn Liquid)  5 ml PO DAILY [facility]


   Last Admin: 01/22/16 09:59 Dose:  5 ml


Nitroglycerin (Nitro-Bid 2% Oint)  2 in TOP Q6 [facility]


   Last Admin: 01/23/16 04:56 Dose:  2 in


Quetiapine Fumarate (Seroquel)  25 mg PO DAILY@1700 [facility]


   Last Admin: 01/22/16 17:11 Dose:  25 mg











- Labs


Labs: 


 





 01/17/16 06:30 





 01/17/16 06:30 





 











PT  11.2 SECONDS (9.4-12.0)   12/14/15  05:20    


 


INR  1.05  (0.89-1.20)   12/14/15  05:20    


 


APTT  36.2 SECONDS (23.1-32.0)  H  12/14/15  05:20    














- Constitutional


Appears: Non-toxic, No Acute Distress, Chronically Ill





- Head Exam


Head Exam: ATRAUMATIC, NORMAL INSPECTION, NORMOCEPHALIC





- Eye Exam


Eye Exam: EOMI, Normal appearance, PERRL


Pupil Exam: NORMAL ACCOMODATION, PERRL





- ENT Exam


ENT Exam: Mucous Membranes Moist, Normal Exam





- Neck Exam


Neck Exam: Full ROM, Normal Inspection.  absent: Lymphadenopathy





- Respiratory Exam


Respiratory Exam: Clear to Ausculation Bilateral, NORMAL BREATHING PATTERN





- Cardiovascular Exam


Cardiovascular Exam: REGULAR RHYTHM, +S1, +S2.  absent: Murmur





- GI/Abdominal Exam


GI & Abdominal Exam: Soft, Normal Bowel Sounds.  absent: Tenderness





- Extremities Exam


Extremities Exam: Full ROM, Normal Capillary Refill, Normal Inspection.  absent

: Joint Swelling, Pedal Edema





- Back Exam


Back Exam: NORMAL INSPECTION





- Neurological Exam


Neurological Exam: Alert, Awake, CN II-XII Intact, Normal Gait, Oriented x3





- Psychiatric Exam


Psychiatric exam: Normal Affect, Normal Mood





- Skin


Skin Exam: Dry, Intact, Normal Color, Warm





Assessment and Plan


(1) DVT prophylaxis


Assessment & Plan: 


scd and aehose, lovenox


Status: Acute





(2) Scrotal edema


Status: Chronic





(3) Diabetes mellitus with hyperglycemia


Assessment & Plan: 


fsbg, monitor


Status: Chronic





(4) Agitation


Assessment & Plan: 


1:1, ativan/haldol prn





Status: Acute





(5) CAD (coronary artery disease)


Assessment & Plan: 


s/p cardiac arrest


cont meds


Status: Acute








- Assessment and Plan (Free Text)


Assessment: 


pending guardianship


pending placement

## 2016-01-24 RX ADMIN — NITROGLYCERIN SCH IN: 20 OINTMENT TOPICAL at 09:05

## 2016-01-24 RX ADMIN — ENOXAPARIN SODIUM SCH MG: 40 INJECTION SUBCUTANEOUS at 08:27

## 2016-01-24 RX ADMIN — NITROGLYCERIN SCH IN: 20 OINTMENT TOPICAL at 16:05

## 2016-01-24 RX ADMIN — NITROGLYCERIN SCH IN: 20 OINTMENT TOPICAL at 21:06

## 2016-01-24 RX ADMIN — NITROGLYCERIN SCH IN: 20 OINTMENT TOPICAL at 05:29

## 2016-01-24 RX ADMIN — Medication SCH ML: at 08:27

## 2016-01-24 NOTE — CP.PCM.PN
Subjective





- Date & Time of Evaluation


Date of Evaluation: 01/24/16


Time of Evaluation: 12:00





- Subjective


Subjective: 


afebrile  NAD





Objective





- Vital Signs/Intake and Output


Vital Signs (last 24 hours): 


 











Temp Pulse Resp BP Pulse Ox


 


 97.6 F   66   20   147/81   100 


 


 01/24/16 07:13  01/24/16 08:27  01/24/16 07:13  01/24/16 08:27  01/24/16 07:13











- Medications


Medications: 


 Current Medications





Aspirin (Ecotrin)  81 mg PO DAILY [facility]


   Last Admin: 01/24/16 08:26 Dose:  81 mg


Enalapril Maleate (Vasotec)  10 mg PO DAILY [facility]


   Last Admin: 01/24/16 08:26 Dose:  10 mg


Enoxaparin Sodium (Lovenox)  40 mg SC DAILY [facility]


   Last Admin: 01/24/16 08:27 Dose:  40 mg


Famotidine (Pepcid)  20 mg GT BID [facility]


   Last Admin: 01/24/16 08:27 Dose:  20 mg


Haloperidol Lactate (Haldol)  2.5 mg IM Q6 PRN


   PRN Reason: Agitation


   Last Admin: 01/23/16 01:15 Dose:  2.5 mg


Levetiracetam (Keppra)  500 mg PO BID [facility]


   Last Admin: 01/24/16 08:26 Dose:  500 mg


Lorazepam (Ativan)  1 mg IVP Q4 PRN


   PRN Reason: Agitation


   Last Admin: 01/24/16 00:13 Dose:  1 mg


Metoprolol Tartrate (Lopressor)  100 mg PO Q12 [facility]


   Last Admin: 01/24/16 08:27 Dose:  100 mg


Multivitamins/Vitamin C (Multi-Delyn Liquid)  5 ml PO DAILY [facility]


   Last Admin: 01/24/16 08:27 Dose:  5 ml


Nitroglycerin (Nitro-Bid 2% Oint)  2 in TOP Q6 [facility]


   Last Admin: 01/24/16 09:05 Dose:  2 in


Quetiapine Fumarate (Seroquel)  25 mg PO DAILY@1700 [facility]


   Last Admin: 01/23/16 16:00 Dose:  25 mg











- Labs


Labs: 


 





 01/17/16 06:30 





 01/17/16 06:30 





 











PT  11.2 SECONDS (9.4-12.0)   12/14/15  05:20    


 


INR  1.05  (0.89-1.20)   12/14/15  05:20    


 


APTT  36.2 SECONDS (23.1-32.0)  H  12/14/15  05:20    














- Constitutional


Appears: Non-toxic, Chronically Ill





- Head Exam


Head Exam: NORMOCEPHALIC





- Eye Exam


Eye Exam: absent: Scleral icterus





- Neck Exam


Neck Exam: absent: Lymphadenopathy





- Respiratory Exam


Respiratory Exam: Decreased Breath Sounds, Rhonchi





- Cardiovascular Exam


Cardiovascular Exam: REGULAR RHYTHM, +S1, +S2





- GI/Abdominal Exam


GI & Abdominal Exam: Distended, Soft





- Rectal Exam


Rectal Exam: Deferred





- Extremities Exam


Extremities Exam: absent: Calf Tenderness, Pedal Edema





- Back Exam


Back Exam: absent: CVA tenderness (L), CVA tenderness (R)





- Neurological Exam


Neurological Exam: Alert, Awake





Assessment and Plan


(1) Agitation


Status: Acute





(2) Aspiration pneumonia


Status: Resolved





(3) Diabetes mellitus with hyperglycemia


Status: Chronic





(4) History of seizure


Status: Chronic





(5) NSTEMI (non-ST elevated myocardial infarction)


Status: Acute

## 2016-01-24 NOTE — CP.PCM.PN
Subjective





- Date & Time of Evaluation


Date of Evaluation: 01/24/16


Time of Evaluation: 10:42





- Subjective


Subjective: 


comfortable in bed, remains w/ periods of agitation but calm and cooperative w/ 

1:1 at bedside at present.  no f/c, n/v/d. 





Review of Systems





- Review of Systems


Systems not reviewed;Unavailable: Psychotic





- Constitutional


Constitutional: UN





- EENT


Eyes: UNREMARKABLE


Ears: UNREMARKABLE


Nose/Mouth/Throat: UNREMARKABLE





- Cardiovascular


Cardiovascular: UNREMARKABLE





- Respiratory


Respiratory: UNREMARKABLE





- Gastrointestinal


Gastrointestinal: UNREMARKABLE





- Genitourinary


Genitourinary: UNREMARKABLE





- Reproductive: Male


Reproductive:Male: UNREMARKABLE





- Musculoskeletal


Musculoskeletal: UNREMARKABLE





- Integumentary


Integumentary: UNREMARKABLE





- Neurological


Neurological: As Per HPI, Confusion





- Psychiatric


Psychiatric: As Per HPI, Behavioral Changes





- Endocrine


Endocrine: UNREMARKABLE





- Hematologic/Lymphatic


Hematologic: UNREMARKABLE





Objective





- Vital Signs/Intake and Output


Vital Signs (last 24 hours): 


 











Temp Pulse Resp BP Pulse Ox


 


 97.6 F   66   20   147/81   100 


 


 01/24/16 07:13  01/24/16 08:27  01/24/16 07:13  01/24/16 08:27  01/24/16 07:13











- Medications


Medications: 


 Current Medications





Aspirin (Ecotrin)  81 mg PO DAILY [facility]


   Last Admin: 01/24/16 08:26 Dose:  81 mg


Enalapril Maleate (Vasotec)  10 mg PO DAILY [facility]


   Last Admin: 01/24/16 08:26 Dose:  10 mg


Enoxaparin Sodium (Lovenox)  40 mg SC DAILY [facility]


   Last Admin: 01/24/16 08:27 Dose:  40 mg


Famotidine (Pepcid)  20 mg GT BID [facility]


   Last Admin: 01/24/16 08:27 Dose:  20 mg


Haloperidol Lactate (Haldol)  2.5 mg IM Q6 PRN


   PRN Reason: Agitation


   Last Admin: 01/23/16 01:15 Dose:  2.5 mg


Levetiracetam (Keppra)  500 mg PO BID [facility]


   Last Admin: 01/24/16 08:26 Dose:  500 mg


Lorazepam (Ativan)  1 mg IVP Q4 PRN


   PRN Reason: Agitation


   Last Admin: 01/24/16 00:13 Dose:  1 mg


Metoprolol Tartrate (Lopressor)  100 mg PO Q12 [facility]


   Last Admin: 01/24/16 08:27 Dose:  100 mg


Multivitamins/Vitamin C (Multi-Delyn Liquid)  5 ml PO DAILY [facility]


   Last Admin: 01/24/16 08:27 Dose:  5 ml


Nitroglycerin (Nitro-Bid 2% Oint)  2 in TOP Q6 [facility]


   Last Admin: 01/24/16 09:05 Dose:  2 in


Quetiapine Fumarate (Seroquel)  25 mg PO DAILY@1700 [facility]


   Last Admin: 01/23/16 16:00 Dose:  25 mg











- Labs


Labs: 


 





 01/17/16 06:30 





 01/17/16 06:30 





 











PT  11.2 SECONDS (9.4-12.0)   12/14/15  05:20    


 


INR  1.05  (0.89-1.20)   12/14/15  05:20    


 


APTT  36.2 SECONDS (23.1-32.0)  H  12/14/15  05:20    














- Constitutional


Appears: Well





- Head Exam


Head Exam: ATRAUMATIC, NORMAL INSPECTION, NORMOCEPHALIC





- Eye Exam


Eye Exam: EOMI, Normal appearance, PERRL


Pupil Exam: NORMAL ACCOMODATION, PERRL





- ENT Exam


ENT Exam: Mucous Membranes Moist, Normal Exam





- Neck Exam


Neck Exam: Full ROM, Normal Inspection.  absent: Lymphadenopathy





- Respiratory Exam


Respiratory Exam: Clear to Ausculation Bilateral, NORMAL BREATHING PATTERN





- Cardiovascular Exam


Cardiovascular Exam: REGULAR RHYTHM, +S1, +S2.  absent: Murmur





- GI/Abdominal Exam


GI & Abdominal Exam: Soft, Normal Bowel Sounds.  absent: Tenderness





- Extremities Exam


Extremities Exam: Full ROM, Normal Capillary Refill, Normal Inspection.  absent

: Joint Swelling, Pedal Edema





- Back Exam


Back Exam: NORMAL INSPECTION





- Neurological Exam


Neurological Exam: Alert, Altered, Awake, CN II-XII Intact, Normal Gait





- Psychiatric Exam


Psychiatric exam: Normal Affect, Normal Mood





- Skin


Skin Exam: Dry, Intact, Normal Color, Warm





Assessment and Plan


(1) DVT prophylaxis


Assessment & Plan: 


scd adn ae hose, lovenox


Status: Acute





(2) Scrotal edema


Status: Chronic





(3) Diabetes mellitus with hyperglycemia


Assessment & Plan: 


fsbg


diet


Status: Chronic





(4) Agitation


Assessment & Plan: 


1:1 to continue


haldol/ativan


Status: Acute





(5) CAD (coronary artery disease)


Assessment & Plan: 


cont all meds


monitor


pending guardianship for placement


Status: Acute

## 2016-01-25 LAB
ALBUMIN SERPL-MCNC: 3.7 G/DL (ref 3.5–5)
ALBUMIN/GLOB SERPL: 0.9 {RATIO} (ref 1–2.1)
ALT SERPL-CCNC: 21 U/L (ref 21–72)
AST SERPL-CCNC: 36 U/L (ref 17–59)
BASOPHILS # BLD AUTO: 0 K/UL (ref 0–0.2)
BASOPHILS NFR BLD: 0.5 % (ref 0–2)
BUN SERPL-MCNC: 20 MG/DL (ref 9–20)
CALCIUM SERPL-MCNC: 9.3 MG/DL (ref 8.4–10.2)
EOSINOPHIL # BLD AUTO: 0.7 K/UL (ref 0–0.7)
EOSINOPHIL NFR BLD: 10.9 % (ref 0–4)
ERYTHROCYTE [DISTWIDTH] IN BLOOD BY AUTOMATED COUNT: 13.2 % (ref 11.5–14.5)
GFR NON-AFRICAN AMERICAN: > 60
HGB BLD-MCNC: 10.1 G/DL (ref 12–18)
LYMPHOCYTES # BLD AUTO: 2.2 K/UL (ref 1–4.3)
LYMPHOCYTES NFR BLD AUTO: 32.5 % (ref 20–40)
MCH RBC QN AUTO: 31.4 PG (ref 27–31)
MCHC RBC AUTO-ENTMCNC: 32.3 G/DL (ref 33–37)
MCV RBC AUTO: 97.1 FL (ref 80–94)
MONOCYTES # BLD: 0.7 K/UL (ref 0–0.8)
MONOCYTES NFR BLD: 10.2 % (ref 0–10)
NEUTROPHILS # BLD: 3.1 K/UL (ref 1.8–7)
NEUTROPHILS NFR BLD AUTO: 45.9 % (ref 50–75)
NRBC BLD AUTO-RTO: 0 % (ref 0–0)
PLATELET # BLD: 254 K/UL (ref 130–400)
PMV BLD AUTO: 7.3 FL (ref 7.2–11.7)
RBC # BLD AUTO: 3.22 MIL/UL (ref 4.4–5.9)
WBC # BLD AUTO: 6.8 K/UL (ref 4.8–10.8)

## 2016-01-25 RX ADMIN — NITROGLYCERIN SCH IN: 20 OINTMENT TOPICAL at 16:20

## 2016-01-25 RX ADMIN — NITROGLYCERIN SCH IN: 20 OINTMENT TOPICAL at 04:37

## 2016-01-25 RX ADMIN — Medication SCH ML: at 09:22

## 2016-01-25 RX ADMIN — NITROGLYCERIN SCH IN: 20 OINTMENT TOPICAL at 21:01

## 2016-01-25 RX ADMIN — NITROGLYCERIN SCH IN: 20 OINTMENT TOPICAL at 09:24

## 2016-01-25 RX ADMIN — ENOXAPARIN SODIUM SCH MG: 40 INJECTION SUBCUTANEOUS at 09:21

## 2016-01-25 NOTE — CP.PCM.PN
Subjective





- Date & Time of Evaluation


Date of Evaluation: 01/25/16


Time of Evaluation: 08:27





- Subjective


Subjective: 


pt in bed no distress,still w/ periods of agitation that require 1:1.  no f/c, n

/v/d. b/w noted.


pt remains w/ poor prognosis and his status remains unchanged.  this patient 

cannot manage own affairs and cannot preform basic ADL w/o assistance.  he is 

unaware of his medical status and unable to properly care for himself.  





Review of Systems





- Review of Systems


Systems not reviewed;Unavailable: Acuity of Condition





- Constitutional


Constitutional: UN





- EENT


Eyes: UNREMARKABLE


Ears: UNREMARKABLE


Nose/Mouth/Throat: UNREMARKABLE





- Cardiovascular


Cardiovascular: UNREMARKABLE





- Respiratory


Respiratory: UNREMARKABLE





- Gastrointestinal


Gastrointestinal: UNREMARKABLE





- Genitourinary


Genitourinary: UNREMARKABLE





- Reproductive: Male


Reproductive:Male: UNREMARKABLE





- Musculoskeletal


Musculoskeletal: UNREMARKABLE





- Integumentary


Integumentary: UNREMARKABLE





- Neurological


Neurological: UNREMARKABLE





- Psychiatric


Psychiatric: UNREMARKABLE





- Endocrine


Endocrine: UNREMARKABLE





- Hematologic/Lymphatic


Hematologic: UNREMARKABLE





Objective





- Vital Signs/Intake and Output


Vital Signs (last 24 hours): 


 











Temp Pulse Resp BP Pulse Ox


 


 97.9 F   63   20   109/67   99 


 


 01/25/16 07:41  01/25/16 07:41  01/25/16 07:41  01/25/16 07:41  01/25/16 07:41











- Medications


Medications: 


 Current Medications





Aspirin (Ecotrin)  81 mg PO DAILY [facility]


   Last Admin: 01/24/16 08:26 Dose:  81 mg


Enalapril Maleate (Vasotec)  10 mg PO DAILY [facility]


   Last Admin: 01/24/16 08:26 Dose:  10 mg


Enoxaparin Sodium (Lovenox)  40 mg SC DAILY [facility]


   Last Admin: 01/24/16 08:27 Dose:  40 mg


Famotidine (Pepcid)  20 mg GT BID [facility]


   Last Admin: 01/24/16 16:05 Dose:  20 mg


Haloperidol Lactate (Haldol)  2.5 mg IM Q6 PRN


   PRN Reason: Agitation


   Last Admin: 01/23/16 01:15 Dose:  2.5 mg


Levetiracetam (Keppra)  500 mg PO BID [facility]


   Last Admin: 01/24/16 16:05 Dose:  500 mg


Lorazepam (Ativan)  1 mg IVP Q4 PRN


   PRN Reason: Agitation


   Last Admin: 01/24/16 00:13 Dose:  1 mg


Metoprolol Tartrate (Lopressor)  100 mg PO Q12 [facility]


   Last Admin: 01/24/16 21:03 Dose:  100 mg


Multivitamins/Vitamin C (Multi-Delyn Liquid)  5 ml PO DAILY [facility]


   Last Admin: 01/24/16 08:27 Dose:  5 ml


Nitroglycerin (Nitro-Bid 2% Oint)  2 in TOP Q6 [facility]


   Last Admin: 01/25/16 04:37 Dose:  2 in


Quetiapine Fumarate (Seroquel)  25 mg PO DAILY@1700 [facility]


   Last Admin: 01/24/16 16:05 Dose:  25 mg











- Labs


Labs: 


 





 01/25/16 04:20 





 01/25/16 04:20 





 











PT  11.2 SECONDS (9.4-12.0)   12/14/15  05:20    


 


INR  1.05  (0.89-1.20)   12/14/15  05:20    


 


APTT  36.2 SECONDS (23.1-32.0)  H  12/14/15  05:20    














- Constitutional


Appears: Well, Non-toxic, Chronically Ill





- Head Exam


Head Exam: ATRAUMATIC, NORMAL INSPECTION, NORMOCEPHALIC





- Eye Exam


Eye Exam: EOMI, Normal appearance, PERRL


Pupil Exam: NORMAL ACCOMODATION, PERRL





- ENT Exam


ENT Exam: Mucous Membranes Moist, Normal Exam





- Neck Exam


Neck Exam: Full ROM, Normal Inspection.  absent: Lymphadenopathy





- Respiratory Exam


Respiratory Exam: Clear to Ausculation Bilateral, NORMAL BREATHING PATTERN





- Cardiovascular Exam


Cardiovascular Exam: REGULAR RHYTHM, +S1, +S2.  absent: Murmur





- GI/Abdominal Exam


GI & Abdominal Exam: Soft, Normal Bowel Sounds.  absent: Tenderness





- Extremities Exam


Extremities Exam: Full ROM, Normal Capillary Refill, Normal Inspection.  absent

: Joint Swelling, Pedal Edema





- Back Exam


Back Exam: NORMAL INSPECTION





- Neurological Exam


Neurological Exam: Abnormal Gait, Alert, Altered, Awake, CN II-XII Intact





- Psychiatric Exam


Psychiatric exam: Normal Affect, Normal Mood





- Skin


Skin Exam: Dry, Intact, Normal Color, Warm





Assessment and Plan


(1) DVT prophylaxis


Assessment & Plan: 


scd and ae hsoe, lovenox


Status: Acute





(2) Scrotal edema


Status: Chronic





(3) Diabetes mellitus with hyperglycemia


Assessment & Plan: 


unlikely diabetes as a1c 5.0.  likely r/t stress of situation, will change fsbg 

to daily and monitor


Status: Chronic





(4) Agitation


Assessment & Plan: 


atican/haldol prn


1:1 as indicated


Status: Acute





(5) CAD (coronary artery disease)


Assessment & Plan: 


cont all meds


Status: Acute

## 2016-01-26 RX ADMIN — NITROGLYCERIN SCH IN: 20 OINTMENT TOPICAL at 16:37

## 2016-01-26 RX ADMIN — Medication SCH ML: at 09:01

## 2016-01-26 RX ADMIN — NITROGLYCERIN SCH IN: 20 OINTMENT TOPICAL at 04:16

## 2016-01-26 RX ADMIN — ENOXAPARIN SODIUM SCH MG: 40 INJECTION SUBCUTANEOUS at 09:01

## 2016-01-26 RX ADMIN — NITROGLYCERIN SCH IN: 20 OINTMENT TOPICAL at 22:00

## 2016-01-26 RX ADMIN — NITROGLYCERIN SCH IN: 20 OINTMENT TOPICAL at 09:03

## 2016-01-26 NOTE — CP.PCM.PN
Subjective





- Date & Time of Evaluation


Date of Evaluation: 01/26/16


Time of Evaluation: 14:22





- Subjective


Subjective: 


pt as assessed, no complaints offered. no fc, n/v/d.  pending guardianship


ROS wnl except AMS from chronic consition





Objective





- Vital Signs/Intake and Output


Vital Signs (last 24 hours): 


 











Temp Pulse Resp BP Pulse Ox


 


 98.1 F   68   20   98/62 L  100 


 


 01/26/16 08:05  01/26/16 09:00  01/26/16 08:05  01/26/16 09:00  01/26/16 08:05











- Medications


Medications: 


 Current Medications





Aspirin (Ecotrin)  81 mg PO DAILY [facility]


   Last Admin: 01/26/16 09:00 Dose:  81 mg


Enalapril Maleate (Vasotec)  10 mg PO DAILY [facility]


   Last Admin: 01/26/16 09:02 Dose:  Not Given


Enoxaparin Sodium (Lovenox)  40 mg SC DAILY [facility]


   Last Admin: 01/26/16 09:01 Dose:  40 mg


Famotidine (Pepcid)  20 mg GT BID [facility]


   Last Admin: 01/26/16 09:01 Dose:  20 mg


Haloperidol Lactate (Haldol)  2.5 mg IM Q6 PRN


   PRN Reason: Agitation


   Last Admin: 01/23/16 01:15 Dose:  2.5 mg


Levetiracetam (Keppra)  500 mg PO BID [facility]


   Last Admin: 01/26/16 09:00 Dose:  500 mg


Lorazepam (Ativan)  1 mg IVP Q4 PRN


   PRN Reason: Agitation


   Last Admin: 01/25/16 12:44 Dose:  1 mg


Metoprolol Tartrate (Lopressor)  100 mg PO Q12 [facility]


   Last Admin: 01/26/16 09:00 Dose:  Not Given


Multivitamins/Vitamin C (Multi-Delyn Liquid)  5 ml PO DAILY [facility]


   Last Admin: 01/26/16 09:01 Dose:  5 ml


Nitroglycerin (Nitro-Bid 2% Oint)  2 in TOP Q6 [facility]


   Last Admin: 01/26/16 09:03 Dose:  2 in


Quetiapine Fumarate (Seroquel)  25 mg PO DAILY@1700 [facility]


   Last Admin: 01/25/16 16:21 Dose:  25 mg











- Labs


Labs: 


 





 01/25/16 04:20 





 01/25/16 04:20 





 











PT  11.2 SECONDS (9.4-12.0)   12/14/15  05:20    


 


INR  1.05  (0.89-1.20)   12/14/15  05:20    


 


APTT  36.2 SECONDS (23.1-32.0)  H  12/14/15  05:20    














- Constitutional


Appears: Non-toxic, No Acute Distress, Chronically Ill





- Head Exam


Head Exam: ATRAUMATIC, NORMAL INSPECTION, NORMOCEPHALIC





- Eye Exam


Eye Exam: EOMI, Normal appearance, PERRL


Pupil Exam: NORMAL ACCOMODATION, PERRL





- ENT Exam


ENT Exam: Mucous Membranes Moist, Normal Exam





- Neck Exam


Neck Exam: Full ROM, Normal Inspection.  absent: Lymphadenopathy





- Respiratory Exam


Respiratory Exam: Clear to Ausculation Bilateral, NORMAL BREATHING PATTERN





- Cardiovascular Exam


Cardiovascular Exam: REGULAR RHYTHM, +S1, +S2.  absent: Murmur





- GI/Abdominal Exam


GI & Abdominal Exam: Soft, Normal Bowel Sounds.  absent: Tenderness





- Extremities Exam


Extremities Exam: Full ROM, Normal Capillary Refill, Normal Inspection.  absent

: Joint Swelling, Pedal Edema





- Back Exam


Back Exam: NORMAL INSPECTION





- Neurological Exam


Neurological Exam: Abnormal Gait, Alert, Awake, CN II-XII Intact


Additional comments: 


oriented to person and time





- Psychiatric Exam


Psychiatric exam: Normal Affect, Normal Mood





- Skin


Skin Exam: Dry, Intact, Normal Color, Warm





Assessment and Plan


(1) DVT prophylaxis


Assessment & Plan: 


scd and ae hose, lovenox


Status: Acute





(2) Scrotal edema


Status: Chronic





(3) Diabetes mellitus with hyperglycemia


Status: Chronic





(4) Agitation


Assessment & Plan: 


ativan/haldol, 1:1 as indicated


Status: Acute





(5) CAD (coronary artery disease)


Assessment & Plan: 


cont all meds


monitor


Status: Acute

## 2016-01-27 RX ADMIN — NITROGLYCERIN SCH: 20 OINTMENT TOPICAL at 10:17

## 2016-01-27 RX ADMIN — NITROGLYCERIN SCH IN: 20 OINTMENT TOPICAL at 04:59

## 2016-01-27 RX ADMIN — Medication SCH ML: at 09:05

## 2016-01-27 RX ADMIN — ENOXAPARIN SODIUM SCH MG: 40 INJECTION SUBCUTANEOUS at 09:05

## 2016-01-27 NOTE — CP.PCM.PN
Subjective





- Date & Time of Evaluation


Date of Evaluation: 01/27/16


Time of Evaluation: 08:39





- Subjective


Subjective: 


pt comfortable in bed, more conversant today.  calm and cooperative but still w

/ periods of agitation requiring 1:1.  vs noted. d/c at length w/ ss. 


ros-negative except for confsuion and agiation.





Objective





- Vital Signs/Intake and Output


Vital Signs (last 24 hours): 


 











Temp Pulse Resp BP Pulse Ox


 


 97.9 F   68   20   129/86   68 L


 


 01/27/16 08:10  01/27/16 08:10  01/27/16 08:10  01/27/16 08:10  01/27/16 08:10











- Medications


Medications: 


 Current Medications





Aspirin (Ecotrin)  81 mg PO DAILY [facility]


   Last Admin: 01/26/16 09:00 Dose:  81 mg


Enalapril Maleate (Vasotec)  10 mg PO DAILY [facility]


   Last Admin: 01/26/16 09:02 Dose:  Not Given


Enoxaparin Sodium (Lovenox)  40 mg SC DAILY [facility]


   Last Admin: 01/26/16 09:01 Dose:  40 mg


Famotidine (Pepcid)  20 mg GT BID [facility]


   Last Admin: 01/26/16 16:38 Dose:  20 mg


Haloperidol Lactate (Haldol)  2.5 mg IM Q6 PRN


   PRN Reason: Agitation


   Last Admin: 01/23/16 01:15 Dose:  2.5 mg


Levetiracetam (Keppra)  500 mg PO BID [facility]


   Last Admin: 01/26/16 16:37 Dose:  500 mg


Lorazepam (Ativan)  1 mg IVP Q4 PRN


   PRN Reason: Agitation


   Last Admin: 01/25/16 12:44 Dose:  1 mg


Metoprolol Tartrate (Lopressor)  100 mg PO Q12 [facility]


   Last Admin: 01/26/16 20:42 Dose:  100 mg


Multivitamins/Vitamin C (Multi-Delyn Liquid)  5 ml PO DAILY [facility]


   Last Admin: 01/26/16 09:01 Dose:  5 ml


Nitroglycerin (Nitro-Bid 2% Oint)  2 in TOP Q6 [facility]


   Last Admin: 01/27/16 04:59 Dose:  2 in


Quetiapine Fumarate (Seroquel)  25 mg PO DAILY@1700 [facility]


   Last Admin: 01/26/16 16:38 Dose:  25 mg











- Labs


Labs: 


 





 01/25/16 04:20 





 01/25/16 04:20 





 











PT  11.2 SECONDS (9.4-12.0)   12/14/15  05:20    


 


INR  1.05  (0.89-1.20)   12/14/15  05:20    


 


APTT  36.2 SECONDS (23.1-32.0)  H  12/14/15  05:20    














- Constitutional


Appears: Well, Non-toxic, No Acute Distress, Chronically Ill





- Head Exam


Head Exam: ATRAUMATIC, NORMAL INSPECTION, NORMOCEPHALIC





- Eye Exam


Eye Exam: EOMI, Normal appearance, PERRL


Pupil Exam: NORMAL ACCOMODATION, PERRL





- ENT Exam


ENT Exam: Mucous Membranes Moist, Normal Exam





- Neck Exam


Neck Exam: Full ROM, Normal Inspection.  absent: Lymphadenopathy





- Respiratory Exam


Respiratory Exam: Clear to Ausculation Bilateral, NORMAL BREATHING PATTERN





- Cardiovascular Exam


Cardiovascular Exam: REGULAR RHYTHM, +S1, +S2.  absent: Murmur





- GI/Abdominal Exam


GI & Abdominal Exam: Soft, Normal Bowel Sounds.  absent: Tenderness





- Extremities Exam


Extremities Exam: Full ROM, Normal Capillary Refill, Normal Inspection.  absent

: Joint Swelling, Pedal Edema





- Back Exam


Back Exam: NORMAL INSPECTION





- Neurological Exam


Neurological Exam: Abnormal Gait, Alert, Awake, CN II-XII Intact


Additional comments: 


oritented to person, year





- Psychiatric Exam


Psychiatric exam: Normal Affect, Normal Mood





- Skin


Skin Exam: Dry, Intact, Normal Color, Warm





Assessment and Plan


(1) DVT prophylaxis


Assessment & Plan: 


scd and ae hose, lovenox


Status: Acute





(2) Scrotal edema


Status: Chronic





(3) Diabetes mellitus with hyperglycemia


Assessment & Plan: 


fsbg daily


Status: Chronic





(4) Agitation


Assessment & Plan: 


ativan/haldol prn


1:1 for safety


Status: Acute





(5) CAD (coronary artery disease)


Assessment & Plan: 


cont all meds


pending guardianship for placement


Status: Acute

## 2016-01-28 RX ADMIN — Medication SCH ML: at 08:19

## 2016-01-28 RX ADMIN — ENOXAPARIN SODIUM SCH MG: 40 INJECTION SUBCUTANEOUS at 08:20

## 2016-01-28 NOTE — CP.PCM.PN
Subjective





- Date & Time of Evaluation


Date of Evaluation: 01/28/16


Time of Evaluation: 07:27





- Subjective


Subjective: 


comfortable in chair, no distress, slightly agitated but redirectable at 

present.  1:1 at bedside for safety. 


ros normal except for mild agitation





Objective





- Vital Signs/Intake and Output


Vital Signs (last 24 hours): 


 











Temp Pulse Resp BP Pulse Ox


 


 97.8 F   73   19   129/74   100 


 


 01/28/16 00:51  01/28/16 00:51  01/28/16 00:51  01/28/16 00:51  01/28/16 00:51











- Medications


Medications: 


 Current Medications





Aspirin (Ecotrin)  81 mg PO DAILY [facility]


   Last Admin: 01/27/16 09:04 Dose:  81 mg


Enalapril Maleate (Vasotec)  10 mg PO DAILY [facility]


   Last Admin: 01/27/16 09:07 Dose:  10 mg


Enoxaparin Sodium (Lovenox)  40 mg SC DAILY [facility]


   Last Admin: 01/27/16 09:05 Dose:  40 mg


Famotidine (Pepcid)  20 mg GT BID [facility]


   Last Admin: 01/27/16 16:41 Dose:  20 mg


Haloperidol Lactate (Haldol)  2.5 mg IM Q6 PRN


   PRN Reason: Agitation


   Last Admin: 01/27/16 21:15 Dose:  2.5 mg


Levetiracetam (Keppra)  500 mg PO BID [facility]


   Last Admin: 01/27/16 16:40 Dose:  500 mg


Lorazepam (Ativan)  1 mg IVP Q4 PRN


   PRN Reason: Agitation


   Last Admin: 01/25/16 12:44 Dose:  1 mg


Metoprolol Tartrate (Lopressor)  100 mg PO Q12 [facility]


   Last Admin: 01/27/16 21:50 Dose:  100 mg


Multivitamins/Vitamin C (Multi-Delyn Liquid)  5 ml PO DAILY [facility]


   Last Admin: 01/27/16 09:05 Dose:  5 ml


Quetiapine Fumarate (Seroquel)  25 mg PO DAILY@1700 [facility]


   Last Admin: 01/27/16 16:41 Dose:  25 mg











- Labs


Labs: 


 





 01/25/16 04:20 





 01/25/16 04:20 





 











PT  11.2 SECONDS (9.4-12.0)   12/14/15  05:20    


 


INR  1.05  (0.89-1.20)   12/14/15  05:20    


 


APTT  36.2 SECONDS (23.1-32.0)  H  12/14/15  05:20    














- Constitutional


Appears: Well, Non-toxic, Chronically Ill





- Head Exam


Head Exam: ATRAUMATIC, NORMAL INSPECTION, NORMOCEPHALIC





- Eye Exam


Eye Exam: EOMI, Normal appearance, PERRL


Pupil Exam: NORMAL ACCOMODATION, PERRL





- ENT Exam


ENT Exam: Mucous Membranes Moist, Normal Exam





- Neck Exam


Neck Exam: Full ROM, Normal Inspection.  absent: Lymphadenopathy





- Respiratory Exam


Respiratory Exam: Clear to Ausculation Bilateral, NORMAL BREATHING PATTERN





- Cardiovascular Exam


Cardiovascular Exam: REGULAR RHYTHM, +S1, +S2.  absent: Murmur





- GI/Abdominal Exam


GI & Abdominal Exam: Soft, Normal Bowel Sounds.  absent: Tenderness





- Extremities Exam


Extremities Exam: Full ROM, Normal Capillary Refill, Normal Inspection.  absent

: Joint Swelling, Pedal Edema





- Back Exam


Back Exam: NORMAL INSPECTION





- Neurological Exam


Neurological Exam: Alert, Awake, CN II-XII Intact, Normal Gait


Additional comments: 


a/o to person, time





- Psychiatric Exam


Psychiatric exam: Agitated, Flat Affect





- Skin


Skin Exam: Dry, Intact, Normal Color, Warm





Assessment and Plan


(1) DVT prophylaxis


Assessment & Plan: 


scd and ae hose, lovenox


Status: Acute





(2) Scrotal edema


Status: Chronic





(3) Diabetes mellitus with hyperglycemia


Assessment & Plan: 


fsbg


monitor


Status: Chronic





(4) Agitation


Assessment & Plan: 


1:1


ativan, haldol


Status: Acute





(5) CAD (coronary artery disease)


Assessment & Plan: 


cont all meds


Status: Acute

## 2016-01-29 LAB
ALBUMIN SERPL-MCNC: 3.9 G/DL (ref 3.5–5)
ALBUMIN/GLOB SERPL: 1 {RATIO} (ref 1–2.1)
ALT SERPL-CCNC: 19 U/L (ref 21–72)
AST SERPL-CCNC: 30 U/L (ref 17–59)
BASOPHILS # BLD AUTO: 0 K/UL (ref 0–0.2)
BASOPHILS NFR BLD: 0.4 % (ref 0–2)
BUN SERPL-MCNC: 21 MG/DL (ref 9–20)
CALCIUM SERPL-MCNC: 9.6 MG/DL (ref 8.4–10.2)
EOSINOPHIL # BLD AUTO: 0.6 K/UL (ref 0–0.7)
EOSINOPHIL NFR BLD: 8.8 % (ref 0–4)
ERYTHROCYTE [DISTWIDTH] IN BLOOD BY AUTOMATED COUNT: 12.9 % (ref 11.5–14.5)
GFR NON-AFRICAN AMERICAN: > 60
HGB BLD-MCNC: 10.9 G/DL (ref 12–18)
LYMPHOCYTES # BLD AUTO: 1.9 K/UL (ref 1–4.3)
LYMPHOCYTES NFR BLD AUTO: 28 % (ref 20–40)
MCH RBC QN AUTO: 30.8 PG (ref 27–31)
MCHC RBC AUTO-ENTMCNC: 31.9 G/DL (ref 33–37)
MCV RBC AUTO: 96.6 FL (ref 80–94)
MONOCYTES # BLD: 0.6 K/UL (ref 0–0.8)
MONOCYTES NFR BLD: 8.4 % (ref 0–10)
NEUTROPHILS # BLD: 3.8 K/UL (ref 1.8–7)
NEUTROPHILS NFR BLD AUTO: 54.4 % (ref 50–75)
NRBC BLD AUTO-RTO: 0 % (ref 0–0)
PLATELET # BLD: 278 K/UL (ref 130–400)
PMV BLD AUTO: 7.1 FL (ref 7.2–11.7)
RBC # BLD AUTO: 3.55 MIL/UL (ref 4.4–5.9)
WBC # BLD AUTO: 6.9 K/UL (ref 4.8–10.8)

## 2016-01-29 RX ADMIN — Medication SCH ML: at 09:08

## 2016-01-29 RX ADMIN — ENOXAPARIN SODIUM SCH MG: 40 INJECTION SUBCUTANEOUS at 09:08

## 2016-01-29 NOTE — CP.PCM.PN
Subjective





- Date & Time of Evaluation


Date of Evaluation: 01/29/16


Time of Evaluation: 10:39





- Subjective


Subjective: 


pt comfortable, no distress, 1:1 at bedside. and pt is less agitated.  


ros negative except periods of agitation





Objective





- Vital Signs/Intake and Output


Vital Signs (last 24 hours): 


 











Temp Pulse Resp BP Pulse Ox


 


 977 F H  68   20   98/64 L  98 


 


 01/29/16 07:52  01/29/16 09:08  01/29/16 07:52  01/29/16 09:08  01/29/16 07:52











- Medications


Medications: 


 Current Medications





Aspirin (Ecotrin)  81 mg PO DAILY [facility]


   Last Admin: 01/29/16 09:08 Dose:  81 mg


Enalapril Maleate (Vasotec)  10 mg PO DAILY [facility]


   Last Admin: 01/29/16 09:08 Dose:  10 mg


Enoxaparin Sodium (Lovenox)  40 mg SC DAILY [facility]


   Last Admin: 01/29/16 09:08 Dose:  40 mg


Famotidine (Pepcid)  20 mg GT BID [facility]


   Last Admin: 01/29/16 09:08 Dose:  20 mg


Haloperidol Lactate (Haldol)  2.5 mg IM Q6 PRN


   PRN Reason: Agitation


   Last Admin: 01/27/16 21:15 Dose:  2.5 mg


Levetiracetam (Keppra)  500 mg PO BID [facility]


   Last Admin: 01/29/16 09:08 Dose:  500 mg


Lorazepam (Ativan)  1 mg IVP Q4 PRN


   PRN Reason: Agitation


   Last Admin: 01/25/16 12:44 Dose:  1 mg


Metoprolol Tartrate (Lopressor)  100 mg PO Q12 [facility]


   Last Admin: 01/29/16 09:08 Dose:  100 mg


Multivitamins/Vitamin C (Multi-Delyn Liquid)  5 ml PO DAILY [facility]


   Last Admin: 01/29/16 09:08 Dose:  5 ml


Quetiapine Fumarate (Seroquel)  25 mg PO DAILY@1700 [facility]


   Last Admin: 01/28/16 16:24 Dose:  25 mg











- Labs


Labs: 


 





 01/29/16 04:15 





 01/29/16 04:15 





 











PT  11.2 SECONDS (9.4-12.0)   12/14/15  05:20    


 


INR  1.05  (0.89-1.20)   12/14/15  05:20    


 


APTT  36.2 SECONDS (23.1-32.0)  H  12/14/15  05:20    














- Constitutional


Appears: Well, Non-toxic, No Acute Distress





- Head Exam


Head Exam: ATRAUMATIC, NORMAL INSPECTION, NORMOCEPHALIC





- Eye Exam


Eye Exam: EOMI, Normal appearance, PERRL


Pupil Exam: NORMAL ACCOMODATION, PERRL





- ENT Exam


ENT Exam: Mucous Membranes Moist, Normal Exam





- Neck Exam


Neck Exam: Full ROM, Normal Inspection.  absent: Lymphadenopathy





- Respiratory Exam


Respiratory Exam: Clear to Ausculation Bilateral, NORMAL BREATHING PATTERN





- Cardiovascular Exam


Cardiovascular Exam: REGULAR RHYTHM, +S1, +S2.  absent: Murmur





- GI/Abdominal Exam


GI & Abdominal Exam: Soft, Normal Bowel Sounds.  absent: Tenderness





- Extremities Exam


Extremities Exam: Full ROM, Normal Capillary Refill, Normal Inspection.  absent

: Joint Swelling, Pedal Edema





- Back Exam


Back Exam: NORMAL INSPECTION





- Neurological Exam


Neurological Exam: Abnormal Gait, Alert, Awake, CN II-XII Intact


Additional comments: 


oriented to person time





- Psychiatric Exam


Psychiatric exam: Normal Affect, Normal Mood





- Skin


Skin Exam: Dry, Intact, Normal Color, Warm





Assessment and Plan


(1) DVT prophylaxis


Assessment & Plan: 


scd and ae hose, lovenox


Status: Acute





(2) Scrotal edema


Status: Chronic





(3) Diabetes mellitus with hyperglycemia


Assessment & Plan: 


monitor glucose


Status: Chronic





(4) Agitation


Assessment & Plan: 


1:1


ativan, haldol


Status: Acute





(5) CAD (coronary artery disease)


Assessment & Plan: 


cont all meds


guardian ship in process


pending placement


Status: Acute

## 2016-01-29 NOTE — CP.PCM.PN
Subjective





- Date & Time of Evaluation


Date of Evaluation: 01/29/16


Time of Evaluation: 09:00





- Subjective


Subjective: 


no new complaints





Objective





- Vital Signs/Intake and Output


Vital Signs (last 24 hours): 


 











Temp Pulse Resp BP Pulse Ox


 


 977 F H  68   20   98/64 L  98 


 


 01/29/16 07:52  01/29/16 09:08  01/29/16 07:52  01/29/16 09:08  01/29/16 07:52











- Medications


Medications: 


 Current Medications





Aspirin (Ecotrin)  81 mg PO DAILY [facility]


   Last Admin: 01/29/16 09:08 Dose:  81 mg


Enalapril Maleate (Vasotec)  10 mg PO DAILY [facility]


   Last Admin: 01/29/16 09:08 Dose:  10 mg


Enoxaparin Sodium (Lovenox)  40 mg SC DAILY [facility]


   Last Admin: 01/29/16 09:08 Dose:  40 mg


Famotidine (Pepcid)  20 mg GT BID [facility]


   Last Admin: 01/29/16 09:08 Dose:  20 mg


Haloperidol Lactate (Haldol)  2.5 mg IM Q6 PRN


   PRN Reason: Agitation


   Last Admin: 01/27/16 21:15 Dose:  2.5 mg


Levetiracetam (Keppra)  500 mg PO BID [facility]


   Last Admin: 01/29/16 09:08 Dose:  500 mg


Lorazepam (Ativan)  1 mg IVP Q4 PRN


   PRN Reason: Agitation


   Last Admin: 01/25/16 12:44 Dose:  1 mg


Metoprolol Tartrate (Lopressor)  100 mg PO Q12 [facility]


   Last Admin: 01/29/16 09:08 Dose:  100 mg


Multivitamins/Vitamin C (Multi-Delyn Liquid)  5 ml PO DAILY [facility]


   Last Admin: 01/29/16 09:08 Dose:  5 ml


Quetiapine Fumarate (Seroquel)  25 mg PO DAILY@1700 [facility]


   Last Admin: 01/28/16 16:24 Dose:  25 mg











- Labs


Labs: 


 





 01/29/16 04:15 





 01/29/16 04:15 





 











PT  11.2 SECONDS (9.4-12.0)   12/14/15  05:20    


 


INR  1.05  (0.89-1.20)   12/14/15  05:20    


 


APTT  36.2 SECONDS (23.1-32.0)  H  12/14/15  05:20    














- Constitutional


Appears: Non-toxic, Chronically Ill





- Head Exam


Head Exam: NORMOCEPHALIC





- Eye Exam


Eye Exam: absent: Scleral icterus





- ENT Exam


ENT Exam: Mucous Membranes Dry, Normal External Ear Exam





- Neck Exam


Neck Exam: absent: Lymphadenopathy, Thyromegaly





- Respiratory Exam


Respiratory Exam: Decreased Breath Sounds, Clear to Ausculation Bilateral





- Cardiovascular Exam


Cardiovascular Exam: REGULAR RHYTHM, +S1, +S2





- GI/Abdominal Exam


GI & Abdominal Exam: Distended, Soft.  absent: Tenderness





- Rectal Exam


Rectal Exam: Deferred





-  Exam


 Exam: NORMAL INSPECTION





- Extremities Exam


Extremities Exam: absent: Calf Tenderness, Pedal Edema





- Back Exam


Back Exam: absent: CVA tenderness (L), CVA tenderness (R)





- Neurological Exam


Neurological Exam: Alert, Awake





Assessment and Plan


(1) Agitation


Status: Acute





(2) Diabetes mellitus with hyperglycemia


Status: Chronic





(3) History of seizure


Status: Chronic





(4) NSTEMI (non-ST elevated myocardial infarction)


Status: Acute

## 2016-01-30 RX ADMIN — Medication SCH ML: at 09:25

## 2016-01-30 RX ADMIN — ENOXAPARIN SODIUM SCH MG: 40 INJECTION SUBCUTANEOUS at 09:28

## 2016-01-30 NOTE — CP.PCM.PN
Subjective





- Date & Time of Evaluation


Date of Evaluation: 01/30/16


Time of Evaluation: 08:21





- Subjective


Subjective: 


no distress, calm in bed at present w/ 1:1 at bedside.  still w/ periods of 

agitation/agression requires meds and 1:1 for safety


requesting nicotine patch last night. requires assistance w/ ambulation/adl


ros negative exacept for unsteady gait and agitation





Objective





- Vital Signs/Intake and Output


Vital Signs (last 24 hours): 


 











Temp Pulse Resp BP Pulse Ox


 


 97.1 F L  85   18   129/77   98 


 


 01/30/16 00:46  01/30/16 00:46  01/30/16 00:46  01/30/16 00:46  01/30/16 00:46











- Medications


Medications: 


 Current Medications





Aspirin (Ecotrin)  81 mg PO DAILY [facility]


   Last Admin: 01/29/16 09:08 Dose:  81 mg


Enalapril Maleate (Vasotec)  10 mg PO DAILY [facility]


   Last Admin: 01/29/16 09:08 Dose:  10 mg


Enoxaparin Sodium (Lovenox)  40 mg SC DAILY [facility]


   Last Admin: 01/29/16 09:08 Dose:  40 mg


Famotidine (Pepcid)  20 mg GT BID [facility]


   Last Admin: 01/29/16 16:33 Dose:  20 mg


Haloperidol Lactate (Haldol)  2.5 mg IM Q6 PRN


   PRN Reason: Agitation


   Last Admin: 01/27/16 21:15 Dose:  2.5 mg


Levetiracetam (Keppra)  500 mg PO BID [facility]


   Last Admin: 01/29/16 16:33 Dose:  500 mg


Lorazepam (Ativan)  1 mg IVP Q4 PRN


   PRN Reason: Agitation


   Last Admin: 01/29/16 14:44 Dose:  1 mg


Metoprolol Tartrate (Lopressor)  100 mg PO Q12 [facility]


   Last Admin: 01/29/16 21:04 Dose:  100 mg


Multivitamins/Vitamin C (Multi-Delyn Liquid)  5 ml PO DAILY [facility]


   Last Admin: 01/29/16 09:08 Dose:  5 ml


Nicotine (Nicoderm Cq)  1 patch TD DAILY [facility]


   Last Admin: 01/29/16 21:55 Dose:  1 patch


Quetiapine Fumarate (Seroquel)  25 mg PO DAILY@1700 [facility]


   Last Admin: 01/29/16 16:33 Dose:  25 mg











- Labs


Labs: 


 





 01/29/16 04:15 





 01/29/16 04:15 





 











PT  11.2 SECONDS (9.4-12.0)   12/14/15  05:20    


 


INR  1.05  (0.89-1.20)   12/14/15  05:20    


 


APTT  36.2 SECONDS (23.1-32.0)  H  12/14/15  05:20    














- Constitutional


Appears: Well, Non-toxic, No Acute Distress, Chronically Ill





- Head Exam


Head Exam: ATRAUMATIC, NORMAL INSPECTION, NORMOCEPHALIC





- Eye Exam


Eye Exam: EOMI, Normal appearance, PERRL


Pupil Exam: NORMAL ACCOMODATION, PERRL





- ENT Exam


ENT Exam: Mucous Membranes Moist, Normal Exam





- Neck Exam


Neck Exam: Full ROM, Normal Inspection.  absent: Lymphadenopathy





- Respiratory Exam


Respiratory Exam: Clear to Ausculation Bilateral, NORMAL BREATHING PATTERN





- Cardiovascular Exam


Cardiovascular Exam: REGULAR RHYTHM, +S1, +S2.  absent: Murmur





- GI/Abdominal Exam


GI & Abdominal Exam: Soft, Normal Bowel Sounds.  absent: Tenderness





- Extremities Exam


Extremities Exam: Full ROM, Normal Capillary Refill, Normal Inspection.  absent

: Joint Swelling, Pedal Edema





- Back Exam


Back Exam: NORMAL INSPECTION





- Neurological Exam


Neurological Exam: Alert, Awake, CN II-XII Intact, Normal Gait, Oriented x3





- Psychiatric Exam


Psychiatric exam: Normal Affect, Normal Mood





- Skin


Skin Exam: Dry, Intact, Normal Color, Warm





Assessment and Plan


(1) DVT prophylaxis


Assessment & Plan: 


scd and ae hsoe, lovenox


Status: Acute





(2) Scrotal edema


Status: Chronic





(3) Diabetes mellitus with hyperglycemia


Assessment & Plan: 


monitor glucose


Status: Chronic





(4) Agitation


Assessment & Plan: 


ativan/haldol, 1:1


Status: Acute





(5) CAD (coronary artery disease)


Assessment & Plan: 


cont all meds


Status: Acute





(6) Smoking


Assessment & Plan: 


nicoderm


Status: Acute

## 2016-01-31 RX ADMIN — Medication SCH ML: at 09:27

## 2016-01-31 RX ADMIN — ENOXAPARIN SODIUM SCH MG: 40 INJECTION SUBCUTANEOUS at 09:28

## 2016-01-31 NOTE — CP.PCM.PN
Subjective





- Date & Time of Evaluation


Date of Evaluation: 01/31/16


Time of Evaluation: 10:55





- Subjective


Subjective: 


pt much more awake/alert today.  no complaints offered but states has 

occational chest pain that are transient.  pt has vague recollection of events 

leading to hospitalization.  no dyspnea, cp, other complaints at present


ros is normal


mood wnl, 1:1 remains at bedside for safety as gait is still very unsteady





Objective





- Vital Signs/Intake and Output


Vital Signs (last 24 hours): 


 











Temp Pulse Resp BP Pulse Ox


 


 96.8 F L  61   20   119/84   100 


 


 01/31/16 08:30  01/31/16 08:30  01/31/16 08:30  01/31/16 09:29  01/31/16 08:30











- Medications


Medications: 


 Current Medications





Aspirin (Ecotrin)  81 mg PO DAILY [facility] at [facility]


   Last Admin: 01/31/16 09:27 Dose:  81 mg


Enalapril Maleate (Vasotec)  10 mg PO DAILY Counts include 234 beds at the [facility]


   Last Admin: 01/31/16 09:28 Dose:  10 mg


Enoxaparin Sodium (Lovenox)  40 mg SC DAILY Counts include 234 beds at the [facility]


   Last Admin: 01/31/16 09:28 Dose:  40 mg


Famotidine (Pepcid)  20 mg GT BID Counts include 234 beds at the [facility]


   Last Admin: 01/31/16 09:28 Dose:  20 mg


Haloperidol Lactate (Haldol)  2.5 mg IM Q6 PRN


   PRN Reason: Agitation


   Last Admin: 01/27/16 21:15 Dose:  2.5 mg


Levetiracetam (Keppra)  500 mg PO BID Counts include 234 beds at the [facility]


   Last Admin: 01/31/16 09:30 Dose:  500 mg


Lorazepam (Ativan)  1 mg IVP Q4 PRN


   PRN Reason: Agitation


   Last Admin: 01/29/16 14:44 Dose:  1 mg


Metoprolol Tartrate (Lopressor)  100 mg PO Q12 Counts include 234 beds at the [facility]


   Last Admin: 01/31/16 09:29 Dose:  100 mg


Multivitamins/Vitamin C (Multi-Delyn Liquid)  5 ml PO DAILY Counts include 234 beds at the [facility]


   Last Admin: 01/31/16 09:27 Dose:  5 ml


Nicotine (Nicoderm Cq)  1 patch TD DAILY Counts include 234 beds at the [facility]


   Last Admin: 01/31/16 09:29 Dose:  1 patch


Quetiapine Fumarate (Seroquel)  25 mg PO DAILY@1700 Counts include 234 beds at the [facility]


   Last Admin: 01/30/16 18:02 Dose:  25 mg











- Labs


Labs: 


 





 01/29/16 04:15 





 01/29/16 04:15 





 











PT  11.2 SECONDS (9.4-12.0)   12/14/15  05:20    


 


INR  1.05  (0.89-1.20)   12/14/15  05:20    


 


APTT  36.2 SECONDS (23.1-32.0)  H  12/14/15  05:20    














- Constitutional


Appears: Well, Non-toxic, No Acute Distress, Chronically Ill





- Head Exam


Head Exam: ATRAUMATIC, NORMAL INSPECTION, NORMOCEPHALIC





- Eye Exam


Eye Exam: EOMI, Normal appearance, PERRL


Pupil Exam: NORMAL ACCOMODATION, PERRL





- ENT Exam


ENT Exam: Mucous Membranes Moist, Normal Exam





- Neck Exam


Neck Exam: Full ROM, Normal Inspection.  absent: Lymphadenopathy





- Respiratory Exam


Respiratory Exam: Clear to Ausculation Bilateral, NORMAL BREATHING PATTERN





- Cardiovascular Exam


Cardiovascular Exam: REGULAR RHYTHM, +S1, +S2.  absent: Murmur





- GI/Abdominal Exam


GI & Abdominal Exam: Soft, Normal Bowel Sounds.  absent: Tenderness





- Extremities Exam


Extremities Exam: Full ROM, Normal Capillary Refill, Normal Inspection.  absent

: Joint Swelling, Pedal Edema





- Back Exam


Back Exam: NORMAL INSPECTION





- Neurological Exam


Neurological Exam: Abnormal Gait, Alert, Awake, CN II-XII Intact, Oriented x3





- Psychiatric Exam


Psychiatric exam: Normal Affect, Normal Mood





- Skin


Skin Exam: Dry, Intact, Normal Color, Warm





Assessment and Plan


(1) DVT prophylaxis


Assessment & Plan: 


scd adn ae hose, lovenox


Status: Acute





(2) Scrotal edema


Status: Chronic





(3) Diabetes mellitus with hyperglycemia


Assessment & Plan: 


monitor glucose


Status: Chronic





(4) Agitation


Assessment & Plan: 


1:1 


ativan/haldol prn


appears less agitated at presen tbut agitation usually started in afternoon/

evening


Status: Acute





(5) CAD (coronary artery disease)


Assessment & Plan: 


cont all meds


cardio for persistent cp as indicated


Status: Acute





(6) Smoking


Assessment & Plan: 


nicoderm


Status: Acute

## 2016-02-01 RX ADMIN — Medication SCH ML: at 09:04

## 2016-02-01 RX ADMIN — ENOXAPARIN SODIUM SCH MG: 40 INJECTION SUBCUTANEOUS at 09:03

## 2016-02-01 NOTE — CP.PCM.PN
Subjective





- Date & Time of Evaluation


Date of Evaluation: 02/01/16


Time of Evaluation: 08:05





- Subjective


Subjective: 


pt sitting up in chair, calm adn cooperative. no distress. 1:1 at bedside for 

agitationa nd aggression. still need pt and assistacne w/ adl for unstaedy gati


ros normal except for periods of agitation and unstatedy gait





Objective





- Vital Signs/Intake and Output


Vital Signs (last 24 hours): 


 











Temp Pulse Resp BP Pulse Ox


 


 97.9 F   62   20   101/74   95 


 


 02/01/16 07:59  02/01/16 07:59  02/01/16 07:59  02/01/16 07:59  02/01/16 07:59











- Medications


Medications: 


 Current Medications





Aspirin (Ecotrin)  81 mg PO DAILY [facility]


   Last Admin: 01/31/16 09:27 Dose:  81 mg


Enalapril Maleate (Vasotec)  10 mg PO DAILY [facility]


   Last Admin: 01/31/16 09:28 Dose:  10 mg


Enoxaparin Sodium (Lovenox)  40 mg SC DAILY [facility]


   Last Admin: 01/31/16 09:28 Dose:  40 mg


Famotidine (Pepcid)  20 mg GT BID [facility]


   Last Admin: 01/31/16 17:05 Dose:  20 mg


Haloperidol Lactate (Haldol)  2.5 mg IM Q6 PRN


   PRN Reason: Agitation


   Last Admin: 01/27/16 21:15 Dose:  2.5 mg


Levetiracetam (Keppra)  500 mg PO BID [facility]


   Last Admin: 01/31/16 17:05 Dose:  500 mg


Lorazepam (Ativan)  1 mg IVP Q4 PRN


   PRN Reason: Agitation


   Last Admin: 01/29/16 14:44 Dose:  1 mg


Metoprolol Tartrate (Lopressor)  100 mg PO Q12 [facility]


   Last Admin: 01/31/16 21:29 Dose:  100 mg


Multivitamins/Vitamin C (Multi-Delyn Liquid)  5 ml PO DAILY [facility]


   Last Admin: 01/31/16 09:27 Dose:  5 ml


Nicotine (Nicoderm Cq)  1 patch TD DAILY [facility]


   Last Admin: 01/31/16 09:29 Dose:  1 patch


Quetiapine Fumarate (Seroquel)  25 mg PO DAILY@1700 [facility]


   Last Admin: 01/31/16 17:05 Dose:  25 mg











- Labs


Labs: 


 





 01/29/16 04:15 





 01/29/16 04:15 





 











PT  11.2 SECONDS (9.4-12.0)   12/14/15  05:20    


 


INR  1.05  (0.89-1.20)   12/14/15  05:20    


 


APTT  36.2 SECONDS (23.1-32.0)  H  12/14/15  05:20    














- Constitutional


Appears: Well, Non-toxic, No Acute Distress, Chronically Ill





- Head Exam


Head Exam: ATRAUMATIC, NORMAL INSPECTION, NORMOCEPHALIC





- Eye Exam


Eye Exam: EOMI, Normal appearance, PERRL


Pupil Exam: NORMAL ACCOMODATION, PERRL





- ENT Exam


ENT Exam: Mucous Membranes Moist, Normal Exam





- Neck Exam


Neck Exam: Full ROM, Normal Inspection.  absent: Lymphadenopathy





- Respiratory Exam


Respiratory Exam: Clear to Ausculation Bilateral, NORMAL BREATHING PATTERN





- Cardiovascular Exam


Cardiovascular Exam: REGULAR RHYTHM, +S1, +S2.  absent: Murmur





- GI/Abdominal Exam


GI & Abdominal Exam: Soft, Normal Bowel Sounds.  absent: Tenderness





- Extremities Exam


Extremities Exam: Full ROM, Normal Capillary Refill, Normal Inspection.  absent

: Joint Swelling, Pedal Edema





- Back Exam


Back Exam: NORMAL INSPECTION





- Neurological Exam


Neurological Exam: Alert, Awake, CN II-XII Intact, Normal Gait, Oriented x3





- Psychiatric Exam


Psychiatric exam: Normal Affect, Normal Mood





- Skin


Skin Exam: Dry, Intact, Normal Color, Warm





Assessment and Plan


(1) DVT prophylaxis


Assessment & Plan: 


scd and ae hseo, lovenox


Status: Acute





(2) Scrotal edema


Status: Chronic





(3) Diabetes mellitus with hyperglycemia


Assessment & Plan: 


fsbg daily


Status: Chronic





(4) Agitation


Assessment & Plan: 


1:1


ativan/haldol prn


Status: Acute





(5) CAD (coronary artery disease)


Assessment & Plan: 


cont all meds


cardio reeval


Status: Acute





(6) Smoking


Assessment & Plan: 


nicoderm


Status: Acute








- Assessment and Plan (Free Text)


Assessment: 


pending guardian/placement

## 2016-02-02 RX ADMIN — Medication SCH ML: at 08:45

## 2016-02-02 RX ADMIN — ENOXAPARIN SODIUM SCH MG: 40 INJECTION SUBCUTANEOUS at 08:46

## 2016-02-02 NOTE — CP.PCM.PN
Subjective





- Date & Time of Evaluation


Date of Evaluation: 02/02/16


Time of Evaluation: 08:01





- Subjective


Subjective: 


pt less responsive today, cooperative and nonagitated.  no complaints offered. 

cardio progress note appriciated.  


ros negative except less responsive today





Objective





- Vital Signs/Intake and Output


Vital Signs (last 24 hours): 


 











Temp Pulse Resp BP Pulse Ox


 


 97.7 F   62   20   119/76   98 


 


 02/02/16 07:36  02/02/16 07:36  02/02/16 07:36  02/02/16 07:36  02/02/16 07:36











- Medications


Medications: 


 Current Medications





Aspirin (Ecotrin)  81 mg PO DAILY [facility]


   Last Admin: 02/01/16 09:03 Dose:  81 mg


Enalapril Maleate (Vasotec)  10 mg PO DAILY [facility]


   Last Admin: 02/01/16 09:03 Dose:  10 mg


Enoxaparin Sodium (Lovenox)  40 mg SC DAILY [facility]


   Last Admin: 02/01/16 09:03 Dose:  40 mg


Famotidine (Pepcid)  20 mg GT BID [facility]


   Last Admin: 02/01/16 16:28 Dose:  20 mg


Haloperidol Lactate (Haldol)  2.5 mg IM Q6 PRN


   PRN Reason: Agitation


   Last Admin: 01/27/16 21:15 Dose:  2.5 mg


Levetiracetam (Keppra)  500 mg PO BID [facility]


   Last Admin: 02/01/16 16:27 Dose:  500 mg


Lorazepam (Ativan)  1 mg IVP Q4 PRN


   PRN Reason: Agitation


   Last Admin: 01/29/16 14:44 Dose:  1 mg


Metoprolol Tartrate (Lopressor)  100 mg PO Q12 [facility]


   Last Admin: 02/01/16 21:17 Dose:  100 mg


Multivitamins/Vitamin C (Multi-Delyn Liquid)  5 ml PO DAILY [facility]


   Last Admin: 02/01/16 09:04 Dose:  5 ml


Nicotine (Nicoderm Cq)  1 patch TD DAILY [facility]


   Last Admin: 02/01/16 09:04 Dose:  1 patch


Quetiapine Fumarate (Seroquel)  25 mg PO DAILY@1700 [facility]


   Last Admin: 02/01/16 16:28 Dose:  25 mg











- Labs


Labs: 


 





 01/29/16 04:15 





 01/29/16 04:15 





 











PT  11.2 SECONDS (9.4-12.0)   12/14/15  05:20    


 


INR  1.05  (0.89-1.20)   12/14/15  05:20    


 


APTT  36.2 SECONDS (23.1-32.0)  H  12/14/15  05:20    














- Constitutional


Appears: Well, Non-toxic, No Acute Distress





- Head Exam


Head Exam: ATRAUMATIC, NORMAL INSPECTION, NORMOCEPHALIC





- Eye Exam


Eye Exam: EOMI, Normal appearance, PERRL


Pupil Exam: NORMAL ACCOMODATION, PERRL





- ENT Exam


ENT Exam: Mucous Membranes Moist, Normal Exam





- Neck Exam


Neck Exam: Full ROM, Normal Inspection.  absent: Lymphadenopathy





- Respiratory Exam


Respiratory Exam: Clear to Ausculation Bilateral, NORMAL BREATHING PATTERN





- Cardiovascular Exam


Cardiovascular Exam: REGULAR RHYTHM, +S1, +S2.  absent: Murmur





- GI/Abdominal Exam


GI & Abdominal Exam: Soft, Normal Bowel Sounds.  absent: Tenderness





-  Exam


 Exam: Circumcision





- Extremities Exam


Extremities Exam: Full ROM, Normal Capillary Refill, Normal Inspection.  absent

: Joint Swelling, Pedal Edema





- Back Exam


Back Exam: NORMAL INSPECTION





- Neurological Exam


Neurological Exam: Alert, Awake, CN II-XII Intact, Normal Gait, Oriented x3





- Psychiatric Exam


Psychiatric exam: Normal Affect, Normal Mood





- Skin


Skin Exam: Dry, Intact, Normal Color, Warm





Assessment and Plan


(1) DVT prophylaxis


Assessment & Plan: 


scd and ae hose, lovenox


Status: Acute





(2) Scrotal edema


Status: Chronic





(3) Diabetes mellitus with hyperglycemia


Assessment & Plan: 


fsbg daily


Status: Chronic





(4) Agitation


Assessment & Plan: 


1:1


ativan/haldol prn


Status: Acute





(5) CAD (coronary artery disease)


Assessment & Plan: 


cont all meds


cardio intervention as appropriate


Status: Acute





(6) Smoking


Assessment & Plan: 


nicoderm


Status: Acute








- Assessment and Plan (Free Text)


Plan: 


pending guardianship for placement

## 2016-02-03 RX ADMIN — ENOXAPARIN SODIUM SCH MG: 40 INJECTION SUBCUTANEOUS at 08:16

## 2016-02-03 RX ADMIN — Medication SCH ML: at 08:16

## 2016-02-04 RX ADMIN — Medication SCH ML: at 08:34

## 2016-02-04 RX ADMIN — ENOXAPARIN SODIUM SCH MG: 40 INJECTION SUBCUTANEOUS at 08:43

## 2016-02-04 NOTE — CP.PCM.PN
Subjective





- Date & Time of Evaluation


Date of Evaluation: 02/04/16


Time of Evaluation: 08:07





- Subjective


Subjective: 


pt comfortable in bed. no distress, complaints. no f/c, n/v/d. no cp.  calm and 

cooperative


ros negative except for period of agitation





Objective





- Vital Signs/Intake and Output


Vital Signs (last 24 hours): 


 











Temp Pulse Resp BP Pulse Ox


 


 98.2 F   74   208 H  99/59 L  99 


 


 02/04/16 00:24  02/04/16 00:24  02/04/16 00:24  02/04/16 00:24  02/04/16 00:24











- Medications


Medications: 


 Current Medications





Aspirin (Ecotrin)  81 mg PO DAILY [facility]


   Last Admin: 02/03/16 08:16 Dose:  81 mg


Enalapril Maleate (Vasotec)  10 mg PO DAILY [facility]


   Last Admin: 02/03/16 08:17 Dose:  10 mg


Enoxaparin Sodium (Lovenox)  40 mg SC DAILY [facility]


   Last Admin: 02/03/16 08:16 Dose:  40 mg


Famotidine (Pepcid)  20 mg GT BID [facility]


   Last Admin: 02/03/16 16:40 Dose:  20 mg


Haloperidol Lactate (Haldol)  2.5 mg IM Q6 PRN


   PRN Reason: Agitation


   Last Admin: 01/27/16 21:15 Dose:  2.5 mg


Levetiracetam (Keppra)  500 mg PO BID [facility]


   Last Admin: 02/03/16 16:40 Dose:  500 mg


Lorazepam (Ativan)  1 mg IVP Q4 PRN


   PRN Reason: Agitation


   Last Admin: 01/29/16 14:44 Dose:  1 mg


Metoprolol Tartrate (Lopressor)  100 mg PO Q12 [facility]


   Last Admin: 02/03/16 21:02 Dose:  100 mg


Multivitamins/Vitamin C (Multi-Delyn Liquid)  5 ml PO DAILY [facility]


   Last Admin: 02/03/16 08:16 Dose:  5 ml


Nicotine (Nicoderm Cq)  1 patch TD DAILY [facility]


   Last Admin: 02/03/16 08:16 Dose:  1 patch


Quetiapine Fumarate (Seroquel)  25 mg PO DAILY@1700 [facility]


   Last Admin: 02/03/16 16:40 Dose:  25 mg











- Labs


Labs: 


 





 01/29/16 04:15 





 01/29/16 04:15 





 











PT  11.2 SECONDS (9.4-12.0)   12/14/15  05:20    


 


INR  1.05  (0.89-1.20)   12/14/15  05:20    


 


APTT  36.2 SECONDS (23.1-32.0)  H  12/14/15  05:20    














- Constitutional


Appears: Well, Non-toxic, No Acute Distress, Chronically Ill





- Head Exam


Head Exam: ATRAUMATIC, NORMAL INSPECTION, NORMOCEPHALIC





- Eye Exam


Eye Exam: EOMI, Normal appearance, PERRL


Pupil Exam: NORMAL ACCOMODATION, PERRL





- ENT Exam


ENT Exam: Mucous Membranes Moist, Normal Exam





- Neck Exam


Neck Exam: Full ROM, Normal Inspection.  absent: Lymphadenopathy





- Respiratory Exam


Respiratory Exam: Clear to Ausculation Bilateral, NORMAL BREATHING PATTERN





- Cardiovascular Exam


Cardiovascular Exam: REGULAR RHYTHM, +S1, +S2.  absent: Murmur





- GI/Abdominal Exam


GI & Abdominal Exam: Soft, Normal Bowel Sounds.  absent: Tenderness





- Extremities Exam


Extremities Exam: Full ROM, Normal Capillary Refill, Normal Inspection.  absent

: Joint Swelling, Pedal Edema





- Back Exam


Back Exam: NORMAL INSPECTION





- Neurological Exam


Neurological Exam: Abnormal Gait, Alert, Awake, CN II-XII Intact, Normal Gait





- Psychiatric Exam


Psychiatric exam: Normal Affect, Normal Mood





- Skin


Skin Exam: Dry, Intact, Normal Color, Warm





Assessment and Plan


(1) DVT prophylaxis


Assessment & Plan: 


scd nad aehose, lovenox


Status: Acute





(2) Scrotal edema


Status: Chronic





(3) Diabetes mellitus with hyperglycemia


Assessment & Plan: 


fsbg daily


Status: Chronic





(4) Agitation


Assessment & Plan: 


1:1


ativan/haldol prn


Status: Acute





(5) CAD (coronary artery disease)


Assessment & Plan: 


cont meds


Status: Acute





(6) Smoking


Assessment & Plan: 


nicodemr


Status: Acute

## 2016-02-05 RX ADMIN — Medication SCH ML: at 09:48

## 2016-02-05 RX ADMIN — ENOXAPARIN SODIUM SCH MG: 40 INJECTION SUBCUTANEOUS at 09:47

## 2016-02-05 NOTE — CP.PCM.PN
Subjective





- Date & Time of Evaluation


Date of Evaluation: 02/05/16


Time of Evaluation: 12:54





- Subjective


Subjective: 


calm and cooperative. no distress. no change to clinical status.1:1 remains


ros negative except for agitation intermittently





Objective





- Vital Signs/Intake and Output


Vital Signs (last 24 hours): 


 











Temp Pulse Resp BP Pulse Ox


 


 97.6 F   70   20   120/54 L  100 


 


 02/05/16 08:54  02/05/16 09:47  02/05/16 08:54  02/05/16 09:47  02/05/16 08:54











- Medications


Medications: 


 Current Medications





Aspirin (Ecotrin)  81 mg PO DAILY [facility]


   Last Admin: 02/05/16 09:46 Dose:  81 mg


Enalapril Maleate (Vasotec)  10 mg PO DAILY [facility]


   Last Admin: 02/05/16 09:49 Dose:  10 mg


Enoxaparin Sodium (Lovenox)  40 mg SC DAILY [facility]


   Last Admin: 02/05/16 09:47 Dose:  40 mg


Famotidine (Pepcid)  20 mg GT BID [facility]


   Last Admin: 02/05/16 09:48 Dose:  20 mg


Haloperidol Lactate (Haldol)  2.5 mg IM Q6 PRN


   PRN Reason: Agitation


   Last Admin: 02/04/16 15:06 Dose:  2.5 mg


Levetiracetam (Keppra)  500 mg PO BID [facility]


   Last Admin: 02/05/16 09:46 Dose:  500 mg


Lorazepam (Ativan)  1 mg IVP Q4 PRN


   PRN Reason: Agitation


   Last Admin: 02/04/16 21:52 Dose:  1 mg


Metoprolol Tartrate (Lopressor)  100 mg PO Q12 [facility]


   Last Admin: 02/05/16 09:47 Dose:  100 mg


Multivitamins/Vitamin C (Multi-Delyn Liquid)  5 ml PO DAILY [facility]


   Last Admin: 02/05/16 09:48 Dose:  5 ml


Nicotine (Nicoderm Cq)  1 patch TD DAILY [facility]


   Last Admin: 02/05/16 09:48 Dose:  1 patch


Quetiapine Fumarate (Seroquel)  25 mg PO DAILY@1700 [facility]


   Last Admin: 02/04/16 17:21 Dose:  25 mg











- Labs


Labs: 


 





 01/29/16 04:15 





 01/29/16 04:15 





 











PT  11.2 SECONDS (9.4-12.0)   12/14/15  05:20    


 


INR  1.05  (0.89-1.20)   12/14/15  05:20    


 


APTT  36.2 SECONDS (23.1-32.0)  H  12/14/15  05:20    














- Constitutional


Appears: Well, Non-toxic, No Acute Distress





- Head Exam


Head Exam: ATRAUMATIC, NORMAL INSPECTION, NORMOCEPHALIC





- Eye Exam


Eye Exam: EOMI, Normal appearance, PERRL


Pupil Exam: NORMAL ACCOMODATION, PERRL





- ENT Exam


ENT Exam: Mucous Membranes Moist, Normal Exam





- Neck Exam


Neck Exam: Full ROM, Normal Inspection.  absent: Lymphadenopathy





- Respiratory Exam


Respiratory Exam: Clear to Ausculation Bilateral, NORMAL BREATHING PATTERN





- Cardiovascular Exam


Cardiovascular Exam: REGULAR RHYTHM, +S1, +S2.  absent: Murmur





- GI/Abdominal Exam


GI & Abdominal Exam: Soft, Normal Bowel Sounds.  absent: Tenderness





- Extremities Exam


Extremities Exam: Full ROM, Normal Capillary Refill, Normal Inspection.  absent

: Joint Swelling, Pedal Edema





- Back Exam


Back Exam: NORMAL INSPECTION





- Neurological Exam


Neurological Exam: Abnormal Gait, Alert, Awake, CN II-XII Intact, Oriented x3





- Psychiatric Exam


Psychiatric exam: Normal Affect, Normal Mood





- Skin


Skin Exam: Dry, Intact, Normal Color, Warm





Assessment and Plan


(1) DVT prophylaxis


Assessment & Plan: 


scd nad aehose, lovenox


Status: Acute





(2) Scrotal edema


Status: Chronic





(3) Diabetes mellitus with hyperglycemia


Assessment & Plan: 


fsbg


Status: Chronic





(4) Agitation


Assessment & Plan: 


1:1


ativan/haldol prn


Status: Acute





(5) CAD (coronary artery disease)


Assessment & Plan: 


cont meds


cardio as indicated


Status: Acute





(6) Smoking


Assessment & Plan: 


nicoderm


Status: Acute

## 2016-02-06 RX ADMIN — Medication SCH ML: at 10:19

## 2016-02-06 RX ADMIN — ENOXAPARIN SODIUM SCH MG: 40 INJECTION SUBCUTANEOUS at 10:17

## 2016-02-06 NOTE — CP.PCM.PN
Subjective





- Date & Time of Evaluation


Date of Evaluation: 02/06/16


Time of Evaluation: 08:10





- Subjective


Subjective: 


dictation # [id number]


remains as assesed


no complaints


1:1 for safety





Objective





- Vital Signs/Intake and Output


Vital Signs (last 24 hours): 


 











Temp Pulse Resp BP Pulse Ox


 


 97.2 F L  58 L  20   103/67   100 


 


 02/06/16 07:41  02/06/16 07:41  02/06/16 07:41  02/06/16 07:41  02/06/16 07:41











- Medications


Medications: 


 Current Medications





Aspirin (Ecotrin)  81 mg PO DAILY [facility]


   Last Admin: 02/05/16 09:46 Dose:  81 mg


Enalapril Maleate (Vasotec)  10 mg PO DAILY [facility]


   Last Admin: 02/05/16 09:49 Dose:  10 mg


Enoxaparin Sodium (Lovenox)  40 mg SC DAILY [facility]


   Last Admin: 02/05/16 09:47 Dose:  40 mg


Famotidine (Pepcid)  20 mg GT BID [facility]


   Last Admin: 02/05/16 18:06 Dose:  20 mg


Haloperidol Lactate (Haldol)  2.5 mg IM Q6 PRN


   PRN Reason: Agitation


   Last Admin: 02/04/16 15:06 Dose:  2.5 mg


Levetiracetam (Keppra)  500 mg PO BID [facility]


   Last Admin: 02/05/16 18:06 Dose:  500 mg


Lorazepam (Ativan)  1 mg IVP Q4 PRN


   PRN Reason: Agitation


   Last Admin: 02/05/16 21:32 Dose:  1 mg


Metoprolol Tartrate (Lopressor)  100 mg PO Q12 [facility]


   Last Admin: 02/05/16 21:33 Dose:  100 mg


Multivitamins/Vitamin C (Multi-Delyn Liquid)  5 ml PO DAILY [facility]


   Last Admin: 02/05/16 09:48 Dose:  5 ml


Nicotine (Nicoderm Cq)  1 patch TD DAILY [facility]


   Last Admin: 02/05/16 09:48 Dose:  1 patch


Quetiapine Fumarate (Seroquel)  25 mg PO DAILY@1700 [facility]


   Last Admin: 02/05/16 18:06 Dose:  25 mg











- Labs


Labs: 


 





 01/29/16 04:15 





 01/29/16 04:15 





 











PT  11.2 SECONDS (9.4-12.0)   12/14/15  05:20    


 


INR  1.05  (0.89-1.20)   12/14/15  05:20    


 


APTT  36.2 SECONDS (23.1-32.0)  H  12/14/15  05:20    














Assessment and Plan


(1) DVT prophylaxis


Status: Acute





(2) Scrotal edema


Status: Chronic





(3) Diabetes mellitus with hyperglycemia


Status: Chronic





(4) Agitation


Status: Acute





(5) CAD (coronary artery disease)


Status: Acute





(6) Smoking


Status: Acute

## 2016-02-06 NOTE — PN
DATE: 02/06/2016



The patient is evaluated in bed.  The 1:1 remains at bedside for safety as patient still has periods 
of agitation and aggression and patient also has unsteady gait and unable to perform ADLs by self.  T
he patient is alert, oriented to person and time, not oriented to place and still not able to make [facility] quality decisions regarding his care.



REVIEW OF SYSTEMS:  Normal except for altered mental status with periods of agitation.



PHYSICAL EXAMINATION:

GENERAL:  The patient is alert and oriented as described in HPI.

HEART:  Regular rate and rhythm, no murmurs, rubs or gallops.

LUNGS:  Clear to auscultation in all fields.

ABDOMEN:  Soft, nontender.  Bowel sounds x 4.

EXTREMITIES:  Distal pulses, motor, sensation intact.  Negative Homans sign.



DIAGNOSES:  Include coronary artery disease, status post cardiac arrest, deep venous thrombosis proph
ylaxis, smoking, altered mental status, agitation.



PLAN:  We will continue all therapy.  We will continue 1:1 for patient's safety.  Ativan and Haldol a
s indicated for agitation, also for safety of the patient as well as safety of the staff.  We will co
ntinue to follow up with  as they are in the process of getting a guardian so the navya
ent can go to a proper facility, subacute versus longterm care.





__________________________________________

Fan SILVER







cc:



DD: 02/06/2016 12:09:55  [id number]

TT: 02/06/2016 12:32:14

Confirmation # [id number]

Dictation # [id number]

tn

## 2016-02-07 RX ADMIN — ENOXAPARIN SODIUM SCH MG: 40 INJECTION SUBCUTANEOUS at 09:47

## 2016-02-07 RX ADMIN — Medication SCH ML: at 09:47

## 2016-02-07 NOTE — CP.PCM.PN
Subjective





- Date & Time of Evaluation


Date of Evaluation: 02/07/16


Time of Evaluation: 08:00





- Subjective


Subjective: 


comfortable in bed, sleeping. 1:1 at bedside for safety. no distress. remains 

unchanged


ros-negative except periods of agitation.





Objective





- Vital Signs/Intake and Output


Vital Signs (last 24 hours): 


 











Temp Pulse Resp BP Pulse Ox


 


 97.8 F   60   20   121/77   99 


 


 02/07/16 07:34  02/07/16 07:34  02/07/16 07:34  02/07/16 07:34  02/07/16 07:34











- Medications


Medications: 


 Current Medications





Aspirin (Ecotrin)  81 mg PO DAILY [facility]


   Last Admin: 02/06/16 10:22 Dose:  81 mg


Enalapril Maleate (Vasotec)  10 mg PO DAILY [facility]


   Last Admin: 02/06/16 13:12 Dose:  10 mg


Enoxaparin Sodium (Lovenox)  40 mg SC DAILY [facility]


   Last Admin: 02/06/16 10:17 Dose:  40 mg


Famotidine (Pepcid)  20 mg GT BID [facility]


   Last Admin: 02/06/16 16:35 Dose:  20 mg


Haloperidol Lactate (Haldol)  2.5 mg IM Q6 PRN


   PRN Reason: Agitation


   Last Admin: 02/06/16 10:49 Dose:  2.5 mg


Levetiracetam (Keppra)  500 mg PO BID [facility]


   Last Admin: 02/06/16 16:35 Dose:  500 mg


Lorazepam (Ativan)  1 mg IVP Q4 PRN


   PRN Reason: Agitation


   Last Admin: 02/06/16 20:01 Dose:  1 mg


Metoprolol Tartrate (Lopressor)  100 mg PO Q12 [facility]


   Last Admin: 02/06/16 20:06 Dose:  100 mg


Multivitamins/Vitamin C (Multi-Delyn Liquid)  5 ml PO DAILY [facility]


   Last Admin: 02/06/16 10:19 Dose:  5 ml


Nicotine (Nicoderm Cq)  1 patch TD DAILY [facility]


   Last Admin: 02/06/16 10:16 Dose:  1 patch


Quetiapine Fumarate (Seroquel)  25 mg PO DAILY@1700 [facility]


   Last Admin: 02/06/16 16:36 Dose:  25 mg











- Labs


Labs: 


 





 01/29/16 04:15 





 01/29/16 04:15 





 











PT  11.2 SECONDS (9.4-12.0)   12/14/15  05:20    


 


INR  1.05  (0.89-1.20)   12/14/15  05:20    


 


APTT  36.2 SECONDS (23.1-32.0)  H  12/14/15  05:20    














- Constitutional


Appears: Well, Non-toxic, No Acute Distress





- Head Exam


Head Exam: ATRAUMATIC, NORMAL INSPECTION, NORMOCEPHALIC





- Eye Exam


Eye Exam: EOMI, Normal appearance, PERRL


Pupil Exam: NORMAL ACCOMODATION, PERRL





- ENT Exam


ENT Exam: Mucous Membranes Moist, Normal Exam





- Neck Exam


Neck Exam: Full ROM, Normal Inspection.  absent: Lymphadenopathy





- Respiratory Exam


Respiratory Exam: Clear to Ausculation Bilateral, NORMAL BREATHING PATTERN





- Cardiovascular Exam


Cardiovascular Exam: REGULAR RHYTHM, +S1, +S2.  absent: Murmur





- GI/Abdominal Exam


GI & Abdominal Exam: Soft, Normal Bowel Sounds.  absent: Tenderness





- Extremities Exam


Extremities Exam: Full ROM, Normal Capillary Refill, Normal Inspection.  absent

: Joint Swelling, Pedal Edema





- Back Exam


Back Exam: NORMAL INSPECTION





- Neurological Exam


Neurological Exam: Alert, Awake, CN II-XII Intact, Normal Gait, Oriented x3





- Psychiatric Exam


Psychiatric exam: Normal Affect, Normal Mood





- Skin


Skin Exam: Dry, Intact, Normal Color, Warm





Assessment and Plan


(1) DVT prophylaxis


Assessment & Plan: 


scd and ae hose, lovnoex


Status: Acute





(2) Scrotal edema


Status: Chronic





(3) Diabetes mellitus with hyperglycemia


Assessment & Plan: 


fsbg daily


Status: Chronic





(4) Agitation


Assessment & Plan: 


1:1


ativan/haldol


Status: Acute





(5) CAD (coronary artery disease)


Assessment & Plan: 


cont all meds


cardio as indicated


Status: Acute





(6) Smoking


Assessment & Plan: 


nicoderm


Status: Acute








- Assessment and Plan (Free Text)


Assessment: 


pending guardian

## 2016-02-08 LAB
APPEARANCE UR: CLEAR
BACTERIA #/AREA URNS HPF: (no result) /[HPF]
BILIRUB UR-MCNC: NEGATIVE MG/DL
COLOR UR: YELLOW
EPI CELLS #/AREA URNS HPF: (no result) /HPF (ref 0–5)
GLUCOSE UR STRIP-MCNC: NEGATIVE MG/DL
LEUKOCYTE ESTERASE UR-ACNC: (no result) LEU/UL
PH UR STRIP: 6.5 [PH] (ref 5–8)
PROT UR STRIP-MCNC: (no result) MG/DL
RBC # UR STRIP: (no result) /UL
RBC #/AREA URNS HPF: (no result) /HPF (ref 0–2)
SP GR UR STRIP: 1.02 (ref 1–1.03)
UROBILINOGEN UR-MCNC: 1 EU/DL (ref 0–0.2)
WBC #/AREA URNS HPF: (no result) /HPF (ref 0–6)

## 2016-02-08 RX ADMIN — Medication SCH ML: at 09:54

## 2016-02-08 NOTE — CP.PCM.PN
Subjective





- Date & Time of Evaluation


Date of Evaluation: 02/08/16


Time of Evaluation: 08:07





- Subjective


Subjective: 


pt offers no complaints. no f/c, n/v/d. no distress. calm at present.


pt had 1x episode of hematuria yesterday, lovenox dc. uro consult reordered. ua 

w/ large blood


ros negative except intermittent agitation, hematuria





Objective





- Vital Signs/Intake and Output


Vital Signs (last 24 hours): 


 











Temp Pulse Resp BP Pulse Ox


 


 97.9 F   76   20   99/62 L  99 


 


 02/08/16 07:40  02/08/16 07:40  02/08/16 07:40  02/08/16 07:40  02/08/16 07:40











- Medications


Medications: 


 Current Medications





Aspirin (Ecotrin)  81 mg PO DAILY [facility]


   Last Admin: 02/07/16 09:46 Dose:  81 mg


Enalapril Maleate (Vasotec)  10 mg PO DAILY [facility]


   Last Admin: 02/07/16 09:48 Dose:  10 mg


Famotidine (Pepcid)  20 mg GT BID [facility]


   Last Admin: 02/07/16 18:14 Dose:  20 mg


Haloperidol Lactate (Haldol)  2.5 mg IM Q6 PRN


   PRN Reason: Agitation


   Last Admin: 02/06/16 10:49 Dose:  2.5 mg


Levetiracetam (Keppra)  500 mg PO BID [facility]


   Last Admin: 02/07/16 18:13 Dose:  500 mg


Lorazepam (Ativan)  1 mg IVP Q4 PRN


   PRN Reason: Agitation


   Last Admin: 02/06/16 20:01 Dose:  1 mg


Metoprolol Tartrate (Lopressor)  100 mg PO Q12 [facility]


   Last Admin: 02/07/16 21:25 Dose:  100 mg


Multivitamins/Vitamin C (Multi-Delyn Liquid)  5 ml PO DAILY [facility]


   Last Admin: 02/07/16 09:47 Dose:  5 ml


Nicotine (Nicoderm Cq)  1 patch TD DAILY [facility]


   Last Admin: 02/07/16 09:48 Dose:  1 patch


Quetiapine Fumarate (Seroquel)  25 mg PO DAILY@1700 [facility]


   Last Admin: 02/07/16 18:14 Dose:  25 mg











- Labs


Labs: 


 





 01/29/16 04:15 





 01/29/16 04:15 





 











PT  11.2 SECONDS (9.4-12.0)   12/14/15  05:20    


 


INR  1.05  (0.89-1.20)   12/14/15  05:20    


 


APTT  36.2 SECONDS (23.1-32.0)  H  12/14/15  05:20    














- Constitutional


Appears: Well, Non-toxic, No Acute Distress





- Head Exam


Head Exam: ATRAUMATIC, NORMAL INSPECTION, NORMOCEPHALIC





- Eye Exam


Eye Exam: EOMI, Normal appearance, PERRL


Pupil Exam: NORMAL ACCOMODATION, PERRL





- ENT Exam


ENT Exam: Mucous Membranes Moist, Normal Exam





- Neck Exam


Neck Exam: Full ROM, Normal Inspection.  absent: Lymphadenopathy





- Respiratory Exam


Respiratory Exam: Clear to Ausculation Bilateral, NORMAL BREATHING PATTERN





- Cardiovascular Exam


Cardiovascular Exam: REGULAR RHYTHM, +S1, +S2.  absent: Murmur





- GI/Abdominal Exam


GI & Abdominal Exam: Soft, Normal Bowel Sounds.  absent: Tenderness





- Extremities Exam


Extremities Exam: Full ROM, Normal Capillary Refill, Normal Inspection.  absent

: Joint Swelling, Pedal Edema





- Back Exam


Back Exam: NORMAL INSPECTION





- Neurological Exam


Neurological Exam: Alert, Awake, CN II-XII Intact, Normal Gait, Oriented x3





- Psychiatric Exam


Psychiatric exam: Normal Affect, Normal Mood





- Skin


Skin Exam: Dry, Intact, Normal Color, Warm





Assessment and Plan


(1) DVT prophylaxis


Assessment & Plan: 


scd and ae hose


lovenoxd/c


Status: Acute





(2) Scrotal edema


Status: Chronic





(3) Diabetes mellitus with hyperglycemia


Assessment & Plan: 


daily fsbg


Status: Chronic





(4) Agitation


Assessment & Plan: 


1:1


ativan/haldol prn


Status: Acute





(5) CAD (coronary artery disease)


Assessment & Plan: 


cont meds


cardio as indicated


pending guardian for disposition


Status: Acute





(6) Smoking


Assessment & Plan: 


nicoderm


Status: Acute





(7) Hematuria


Assessment & Plan: 


d/c lovenox


urology consult


Status: Acute

## 2016-02-09 RX ADMIN — Medication SCH ML: at 09:28

## 2016-02-09 NOTE — CP.PCM.PN
Subjective





- Date & Time of Evaluation


Date of Evaluation: 02/09/16


Time of Evaluation: 08:10





- Subjective


Subjective: 


pt remains as assessed, no pain, distress, complaints. no f/c, n/v/d. still w/ 

minimal amt of hematuria pending uro consult.  pt still gets agitated at times 

required sedationa dn 1:1


ros negative except intermittent agitation, hematuria





Objective





- Vital Signs/Intake and Output


Vital Signs (last 24 hours): 


 











Temp Pulse Resp BP Pulse Ox


 


 97.7 F   88   20   128/88   98 


 


 02/09/16 00:59  02/09/16 00:59  02/09/16 00:59  02/09/16 00:59  02/09/16 00:59











- Medications


Medications: 


 Current Medications





Aspirin (Ecotrin)  81 mg PO DAILY [facility]


   Last Admin: 02/08/16 09:53 Dose:  81 mg


Enalapril Maleate (Vasotec)  10 mg PO DAILY [facility]


   Last Admin: 02/08/16 09:55 Dose:  Not Given


Famotidine (Pepcid)  20 mg GT BID [facility]


   Last Admin: 02/08/16 17:06 Dose:  20 mg


Haloperidol Lactate (Haldol)  2.5 mg IM Q6 PRN


   PRN Reason: Agitation


   Last Admin: 02/06/16 10:49 Dose:  2.5 mg


Levetiracetam (Keppra)  500 mg PO BID [facility]


   Last Admin: 02/08/16 17:06 Dose:  500 mg


Lorazepam (Ativan)  1 mg IVP Q4 PRN


   PRN Reason: Agitation


   Last Admin: 02/06/16 20:01 Dose:  1 mg


Metoprolol Tartrate (Lopressor)  100 mg PO Q12 [facility]


   Last Admin: 02/08/16 22:00 Dose:  100 mg


Multivitamins/Vitamin C (Multi-Delyn Liquid)  5 ml PO DAILY [facility]


   Last Admin: 02/08/16 09:54 Dose:  5 ml


Nicotine (Nicoderm Cq)  1 patch TD DAILY [facility]


   Last Admin: 02/08/16 09:54 Dose:  1 patch


Quetiapine Fumarate (Seroquel)  25 mg PO DAILY@1700 [facility]


   Last Admin: 02/08/16 17:07 Dose:  25 mg











- Labs


Labs: 


 





 01/29/16 04:15 





 01/29/16 04:15 





 











PT  11.2 SECONDS (9.4-12.0)   12/14/15  05:20    


 


INR  1.05  (0.89-1.20)   12/14/15  05:20    


 


APTT  36.2 SECONDS (23.1-32.0)  H  12/14/15  05:20    














- Constitutional


Appears: Well, Non-toxic, No Acute Distress, Chronically Ill





- Head Exam


Head Exam: ATRAUMATIC, NORMAL INSPECTION, NORMOCEPHALIC





- Eye Exam


Eye Exam: EOMI, Normal appearance, PERRL


Pupil Exam: NORMAL ACCOMODATION, PERRL





- ENT Exam


ENT Exam: Mucous Membranes Moist, Normal Exam





- Neck Exam


Neck Exam: Full ROM, Normal Inspection.  absent: Lymphadenopathy





- Respiratory Exam


Respiratory Exam: Clear to Ausculation Bilateral, NORMAL BREATHING PATTERN





- Cardiovascular Exam


Cardiovascular Exam: REGULAR RHYTHM, +S1, +S2.  absent: Murmur





- GI/Abdominal Exam


GI & Abdominal Exam: Soft, Normal Bowel Sounds.  absent: Tenderness





- Extremities Exam


Extremities Exam: Full ROM, Normal Capillary Refill, Normal Inspection.  absent

: Joint Swelling, Pedal Edema





- Back Exam


Back Exam: NORMAL INSPECTION





- Neurological Exam


Neurological Exam: Alert, Awake, CN II-XII Intact, Normal Gait, Oriented x3





- Psychiatric Exam


Psychiatric exam: Normal Affect, Normal Mood





- Skin


Skin Exam: Dry, Intact, Normal Color, Warm





Assessment and Plan


(1) DVT prophylaxis


Assessment & Plan: 


scd and aehose


lovenox d/c r/t hematuria


Status: Acute





(2) Scrotal edema


Status: Chronic





(3) Diabetes mellitus with hyperglycemia


Assessment & Plan: 


fsbg daily


Status: Chronic





(4) Agitation


Assessment & Plan: 


1:1


ativan/haldol prn


Status: Acute





(5) CAD (coronary artery disease)


Assessment & Plan: 


cont all meds


cardio as indicated


Status: Acute





(6) Smoking


Assessment & Plan: 


nicoderm


Status: Acute





(7) Hematuria


Assessment & Plan: 


hold lovenox


hydration


urology consult


Status: Acute

## 2016-02-10 LAB
ALBUMIN SERPL-MCNC: 4.2 G/DL (ref 3.5–5)
ALBUMIN/GLOB SERPL: 1 {RATIO} (ref 1–2.1)
ALT SERPL-CCNC: 20 U/L (ref 21–72)
AST SERPL-CCNC: 19 U/L (ref 17–59)
BASOPHILS # BLD AUTO: 0 K/UL (ref 0–0.2)
BASOPHILS NFR BLD: 0.7 % (ref 0–2)
BUN SERPL-MCNC: 20 MG/DL (ref 9–20)
CALCIUM SERPL-MCNC: 9.3 MG/DL (ref 8.4–10.2)
EOSINOPHIL # BLD AUTO: 0.7 K/UL (ref 0–0.7)
EOSINOPHIL NFR BLD: 9.3 % (ref 0–4)
ERYTHROCYTE [DISTWIDTH] IN BLOOD BY AUTOMATED COUNT: 13.3 % (ref 11.5–14.5)
GFR NON-AFRICAN AMERICAN: > 60
HGB BLD-MCNC: 11.2 G/DL (ref 12–18)
LYMPHOCYTES # BLD AUTO: 2.4 K/UL (ref 1–4.3)
LYMPHOCYTES NFR BLD AUTO: 32.3 % (ref 20–40)
MCH RBC QN AUTO: 30.5 PG (ref 27–31)
MCHC RBC AUTO-ENTMCNC: 31.3 G/DL (ref 33–37)
MCV RBC AUTO: 97.4 FL (ref 80–94)
MONOCYTES # BLD: 0.8 K/UL (ref 0–0.8)
MONOCYTES NFR BLD: 11.2 % (ref 0–10)
NEUTROPHILS # BLD: 3.5 K/UL (ref 1.8–7)
NEUTROPHILS NFR BLD AUTO: 46.5 % (ref 50–75)
NRBC BLD AUTO-RTO: 0 % (ref 0–0)
PLATELET # BLD: 326 K/UL (ref 130–400)
PMV BLD AUTO: 6.9 FL (ref 7.2–11.7)
RBC # BLD AUTO: 3.68 MIL/UL (ref 4.4–5.9)
WBC # BLD AUTO: 7.5 K/UL (ref 4.8–10.8)

## 2016-02-10 RX ADMIN — Medication SCH ML: at 12:55

## 2016-02-10 NOTE — CP.PCM.PN
Subjective





- Date & Time of Evaluation


Date of Evaluation: 02/10/16


Time of Evaluation: 08:06





- Subjective


Subjective: 


pt remains as assessed. no f/c, n/v/d. denies current hematuira.  pending ct abd

/pelvis as ordered by urology


pt still requiring 1:1 for safety as he gets agitated/agressive


ros-agitation, hematuria





Objective





- Vital Signs/Intake and Output


Vital Signs (last 24 hours): 


 











Temp Pulse Resp BP Pulse Ox


 


 97.3 F L  65   20   120/71   98 


 


 02/10/16 07:46  02/10/16 07:46  02/10/16 07:46  02/10/16 07:46  02/10/16 07:46











- Medications


Medications: 


 Current Medications





Aspirin (Ecotrin)  81 mg PO DAILY [facility]


   Last Admin: 02/09/16 09:28 Dose:  81 mg


Enalapril Maleate (Vasotec)  10 mg PO DAILY [facility]


   Last Admin: 02/09/16 09:27 Dose:  10 mg


Famotidine (Pepcid)  20 mg GT BID [facility]


   Last Admin: 02/09/16 17:35 Dose:  20 mg


Haloperidol Lactate (Haldol)  2.5 mg IM Q6 PRN


   PRN Reason: Agitation


   Last Admin: 02/06/16 10:49 Dose:  2.5 mg


Levetiracetam (Keppra)  500 mg PO BID [facility]


   Last Admin: 02/09/16 17:34 Dose:  500 mg


Lorazepam (Ativan)  1 mg IVP Q4 PRN


   PRN Reason: Agitation


   Last Admin: 02/09/16 20:34 Dose:  1 mg


Metoprolol Tartrate (Lopressor)  100 mg PO Q12 [facility]


   Last Admin: 02/09/16 22:00 Dose:  Not Given


Multivitamins/Vitamin C (Multi-Delyn Liquid)  5 ml PO DAILY [facility]


   Last Admin: 02/09/16 09:28 Dose:  5 ml


Nicotine (Nicoderm Cq)  1 patch TD DAILY [facility]


   Last Admin: 02/09/16 09:28 Dose:  1 patch


Quetiapine Fumarate (Seroquel)  25 mg PO DAILY@1700 [facility]


   Last Admin: 02/09/16 17:35 Dose:  25 mg











- Labs


Labs: 


 





 01/29/16 04:15 





 01/29/16 04:15 





 











PT  11.2 SECONDS (9.4-12.0)   12/14/15  05:20    


 


INR  1.05  (0.89-1.20)   12/14/15  05:20    


 


APTT  36.2 SECONDS (23.1-32.0)  H  12/14/15  05:20    














- Constitutional


Appears: Well, Non-toxic, No Acute Distress, Chronically Ill





- Head Exam


Head Exam: ATRAUMATIC, NORMAL INSPECTION, NORMOCEPHALIC





- Eye Exam


Eye Exam: EOMI, Normal appearance, PERRL


Pupil Exam: NORMAL ACCOMODATION, PERRL





- ENT Exam


ENT Exam: Mucous Membranes Moist, Normal Exam





- Neck Exam


Neck Exam: Full ROM, Normal Inspection.  absent: Lymphadenopathy





- Respiratory Exam


Respiratory Exam: Clear to Ausculation Bilateral, NORMAL BREATHING PATTERN





- Cardiovascular Exam


Cardiovascular Exam: REGULAR RHYTHM, +S1, +S2.  absent: Murmur





- GI/Abdominal Exam


GI & Abdominal Exam: Soft, Normal Bowel Sounds.  absent: Tenderness





-  Exam


 Exam: Circumcision





- Extremities Exam


Extremities Exam: Full ROM, Normal Capillary Refill, Normal Inspection.  absent

: Joint Swelling, Pedal Edema





- Back Exam


Back Exam: NORMAL INSPECTION





- Neurological Exam


Neurological Exam: Abnormal Gait, Alert, Altered, Awake, CN II-XII Intact





- Psychiatric Exam


Psychiatric exam: Normal Affect, Normal Mood





- Skin


Skin Exam: Dry, Intact, Normal Color, Warm





Assessment and Plan


(1) DVT prophylaxis


Assessment & Plan: 


scd and aehose, lovenox on hold for hematuria


Status: Acute





(2) Scrotal edema


Assessment & Plan: 


urology


Status: Chronic





(3) Diabetes mellitus with hyperglycemia


Assessment & Plan: 


fsbg


stable


Status: Chronic





(4) Agitation


Assessment & Plan: 


1:1


ativan/haldol prn


Status: Acute





(5) CAD (coronary artery disease)


Assessment & Plan: 


cont all meds


Status: Acute





(6) Smoking


Assessment & Plan: 


nicoderm


Status: Acute





(7) Hematuria


Assessment & Plan: 


urology


hold lovenox


ct abd/pelvis


hydration


Status: Acute

## 2016-02-10 NOTE — CON
DATE: 02/09/2016



This is a 55-year-old male who has been hospitalized for a long period of time with recent gross hema
turia.  There was no past history.  No family history.  No relatives available for further history.  
On the basis of the hematuria, a CAT scan with and without IV contrast was ordered and we will follow
 with the results of the CAT scan for further workup.





__________________________________________

Samuel Biggs MD







cc:



DD: 02/09/2016 14:34:50  [id number]

TT: 02/09/2016 15:34:33

Confirmation # [id number]

Dictation # [id number]

en

## 2016-02-10 NOTE — CT
PROCEDURE:  CT Abdomen and Pelvis with Oral contrast.



HISTORY:

hematuria



COMPARISON:

None.



TECHNIQUE:

Contiguous axial images of the abdomen and pelvis. Oral contrast was 

administered. No IV contrast given. Coronal and Sagittal reformats 

generated. 



Radiation dose:



Total exam DLP = 813.3 mGy-cm.



FINDINGS:



LOWER THORAX:

2.4 x 1.6 cm pleural-based, triangular masslike opacity in the right 

lower lobe (series 2, image 7) likely represents atelectasis or 

scarring.  A neoplastic etiology cannot be excluded.  CT followup is 

recommended in 3 months. The left lung bases is the clear.  The heart 

is normal in size. No pleural or pericardial effusion.



LIVER:

Unremarkable. No gross lesion or ductal dilatation.



GALLBLADDER AND BILE DUCTS:

Unremarkable. 



PANCREAS:

Unremarkable. No mass. No ductal dilatation.



SPLEEN:

Unremarkable. No splenomegaly. 



ADRENALS:

Unremarkable. 



KIDNEYS AND URETERS:

The kidneys are normal in size and contour. No urolithiasis or 

hydronephrosis. 



BLADDER:

Grossly unremarkable.



REPRODUCTIVE:

Unremarkable. 



APPENDIX:

Unremarkable.



STOMACH AND BOWEL:

Severe diverticulosis in the colon, especially in the descending and 

sigmoid colon.  Mild mural thickening, with surrounding mild fat 

stranding.  Findings indicate acute diverticulitis. No free air.  

Within the limits of a noncontrast enhanced CT, there is no evidence 

of an abscess. 



PERITONEUM:

Unremarkable. No fluid collection. No free air.



LYMPH NODES:

Unremarkable. No enlarged lymph nodes. 



VASCULATURE:

Mild atherosclerotic calcification in the abdominal aorta and pelvic 

arteries.  No aneurysm. 



BONES:

No acute fracture.  No lytic or blastic lesion.  Old healed fracture 

of the left posterior twelfth rib. Prominent loss of disc height at 

L5-S1 with a small disc bulge. At least moderate bilateral neural 

foraminal stenoses at L5-S1.  Small disc bulge resulting in mild 

central canal and neural foraminal stenoses is also seen at L4-5. 



OTHER FINDINGS:

None. 



IMPRESSION:





1. Acute diverticulitis involving the proximal sigmoid colon. 



2. Severe colonic diverticulosis, most prominent in the descending 

and sigmoid colon. 



3. No etiology for hematuria is found.  No urolithiasis or 

hydronephrosis.

## 2016-02-11 RX ADMIN — Medication SCH ML: at 10:03

## 2016-02-11 NOTE — CP.PCM.PN
Subjective





- Date & Time of Evaluation


Date of Evaluation: 02/11/16


Time of Evaluation: 12:20





- Subjective


Subjective: 


no new complaints





Objective





- Vital Signs/Intake and Output


Vital Signs (last 24 hours): 


 











Temp Pulse Resp BP Pulse Ox


 


 98.4 F   77   20   111/68   99 


 


 02/11/16 16:35  02/11/16 16:35  02/11/16 16:35  02/11/16 16:35  02/11/16 16:35











- Medications


Medications: 


 Current Medications





Aspirin (Ecotrin)  81 mg PO DAILY [facility]


   Last Admin: 02/11/16 10:02 Dose:  81 mg


Enalapril Maleate (Vasotec)  10 mg PO DAILY [facility]


   Last Admin: 02/11/16 10:04 Dose:  10 mg


Famotidine (Pepcid)  20 mg GT BID [facility]


   Last Admin: 02/11/16 10:03 Dose:  20 mg


Haloperidol Lactate (Haldol)  2.5 mg IM Q6 PRN


   PRN Reason: Agitation


   Last Admin: 02/06/16 10:49 Dose:  2.5 mg


Levetiracetam (Keppra)  500 mg PO BID [facility]


   Last Admin: 02/11/16 10:02 Dose:  500 mg


Lorazepam (Ativan)  1 mg IVP Q4 PRN


   PRN Reason: Agitation


   Last Admin: 02/11/16 12:07 Dose:  1 mg


Metoprolol Tartrate (Lopressor)  100 mg PO Q12 [facility]


   Last Admin: 02/11/16 10:02 Dose:  100 mg


Multivitamins/Vitamin C (Multi-Delyn Liquid)  5 ml PO DAILY [facility]


   Last Admin: 02/11/16 10:03 Dose:  5 ml


Nicotine (Nicoderm Cq)  1 patch TD DAILY [facility]


   Last Admin: 02/11/16 10:03 Dose:  1 patch


Quetiapine Fumarate (Seroquel)  25 mg PO DAILY@1700 [facility]


   Last Admin: 02/10/16 17:00 Dose:  25 mg











- Labs


Labs: 


 





 02/10/16 16:05 





 02/10/16 16:05 





 











PT  11.2 SECONDS (9.4-12.0)   12/14/15  05:20    


 


INR  1.05  (0.89-1.20)   12/14/15  05:20    


 


APTT  36.2 SECONDS (23.1-32.0)  H  12/14/15  05:20    














- GI/Abdominal Exam


GI & Abdominal Exam: Normal Bowel Sounds





Assessment and Plan





- Assessment and Plan (Free Text)


Assessment: 


56 yo male with now diverticulitis





no pain


tolerating diet


abx

## 2016-02-11 NOTE — CP.PCM.PN
Subjective





- Date & Time of Evaluation


Date of Evaluation: 02/11/16


Time of Evaluation: 08:08





- Subjective


Subjective: 


pt remains as assessed. 1:1 at bedside for safety. no complaints offered. no 

abd pain. n/v/d, f/c. no further hematuria since lovenox dc. 


ros negative except agitation at times


ct abd/pelvis results noted-however in absence of abd pain. n/v/d, f/c, blood 

in stool or white count will defer anbx for tx of diverticulitis until pt seen 

by GI





Objective





- Vital Signs/Intake and Output


Vital Signs (last 24 hours): 


 











Temp Pulse Resp BP Pulse Ox


 


 97.3 F L  73   18   121/74   97 


 


 02/11/16 07:45  02/11/16 07:45  02/11/16 07:45  02/11/16 07:45  02/11/16 07:45











- Medications


Medications: 


 Current Medications





Aspirin (Ecotrin)  81 mg PO DAILY [facility]


   Last Admin: 02/10/16 12:54 Dose:  81 mg


Enalapril Maleate (Vasotec)  10 mg PO DAILY [facility]


   Last Admin: 02/10/16 12:57 Dose:  10 mg


Famotidine (Pepcid)  20 mg GT BID [facility]


   Last Admin: 02/10/16 16:53 Dose:  20 mg


Haloperidol Lactate (Haldol)  2.5 mg IM Q6 PRN


   PRN Reason: Agitation


   Last Admin: 02/06/16 10:49 Dose:  2.5 mg


Levetiracetam (Keppra)  500 mg PO BID [facility]


   Last Admin: 02/10/16 16:52 Dose:  500 mg


Lorazepam (Ativan)  1 mg IVP Q4 PRN


   PRN Reason: Agitation


   Last Admin: 02/09/16 20:34 Dose:  1 mg


Metoprolol Tartrate (Lopressor)  100 mg PO Q12 [facility]


   Last Admin: 02/10/16 21:10 Dose:  100 mg


Multivitamins/Vitamin C (Multi-Delyn Liquid)  5 ml PO DAILY [facility]


   Last Admin: 02/10/16 12:55 Dose:  5 ml


Nicotine (Nicoderm Cq)  1 patch TD DAILY [facility]


   Last Admin: 02/10/16 12:55 Dose:  1 patch


Quetiapine Fumarate (Seroquel)  25 mg PO DAILY@1700 [facility]


   Last Admin: 02/09/16 17:35 Dose:  25 mg











- Labs


Labs: 


 





 02/10/16 16:05 





 02/10/16 16:05 





 











PT  11.2 SECONDS (9.4-12.0)   12/14/15  05:20    


 


INR  1.05  (0.89-1.20)   12/14/15  05:20    


 


APTT  36.2 SECONDS (23.1-32.0)  H  12/14/15  05:20    














- Constitutional


Appears: Well, Non-toxic, No Acute Distress, Chronically Ill





- Head Exam


Head Exam: ATRAUMATIC, NORMAL INSPECTION, NORMOCEPHALIC





- Eye Exam


Eye Exam: EOMI, Normal appearance, PERRL


Pupil Exam: NORMAL ACCOMODATION, PERRL





- ENT Exam


ENT Exam: Mucous Membranes Moist, Normal Exam





- Neck Exam


Neck Exam: Full ROM, Normal Inspection.  absent: Lymphadenopathy





- Respiratory Exam


Respiratory Exam: Clear to Ausculation Bilateral, NORMAL BREATHING PATTERN





- Cardiovascular Exam


Cardiovascular Exam: REGULAR RHYTHM, +S1, +S2.  absent: Murmur





- GI/Abdominal Exam


GI & Abdominal Exam: Soft, Normal Bowel Sounds.  absent: Tenderness





-  Exam


Bimanual exam: NORMAL BIMANUAL EXAM





- Extremities Exam


Extremities Exam: Full ROM, Normal Capillary Refill, Normal Inspection.  absent

: Joint Swelling, Pedal Edema





- Back Exam


Back Exam: NORMAL INSPECTION





- Neurological Exam


Neurological Exam: Alert, Awake, CN II-XII Intact, Normal Gait, Oriented x3





- Psychiatric Exam


Psychiatric exam: Normal Affect, Normal Mood





- Skin


Skin Exam: Dry, Intact, Normal Color, Warm





Assessment and Plan


(1) DVT prophylaxis


Assessment & Plan: 


scd and ae hose, hold lovenox


Status: Acute





(2) Scrotal edema


Status: Chronic





(3) Diabetes mellitus with hyperglycemia


Assessment & Plan: 


fsbg  daily


Status: Chronic





(4) Agitation


Assessment & Plan: 


1:1 


ativan/haldol


seroquel


Status: Acute





(5) CAD (coronary artery disease)


Assessment & Plan: 


cont meds


Status: Acute





(6) Smoking


Assessment & Plan: 


nicoderm


Status: Acute





(7) Hematuria


Assessment & Plan: 


appears to be resolved


hold lovenox


urology consult





Status: Acute





(8) Diverticulitis


Assessment & Plan: 


noted on ct


denies complaints


GI consult


altered gi diet


Status: Acute

## 2016-02-12 RX ADMIN — Medication SCH ML: at 09:16

## 2016-02-12 NOTE — CP.PCM.PN
Subjective





- Date & Time of Evaluation


Date of Evaluation: 02/12/16


Time of Evaluation: 10:27





- Subjective


Subjective: 


pt rmeains as assessed, no compalints. no f/c, n/v/d. no abd pain.  no furthe 

rhematuria. calm adn cooperatieve at present. per rn pt gets agitated when 1:1 

is d/c


ros-agitation





Objective





- Vital Signs/Intake and Output


Vital Signs (last 24 hours): 


 











Temp Pulse Resp BP Pulse Ox


 


 98.1 F   91 H  20   120/60   98 


 


 02/12/16 08:34  02/12/16 09:16  02/12/16 08:34  02/12/16 09:16  02/12/16 08:34











- Medications


Medications: 


 Current Medications





Aspirin (Ecotrin)  81 mg PO DAILY [facility]


   Last Admin: 02/12/16 09:18 Dose:  81 mg


Enalapril Maleate (Vasotec)  10 mg PO DAILY [facility]


   Last Admin: 02/12/16 09:18 Dose:  10 mg


Famotidine (Pepcid)  20 mg GT BID [facility]


   Last Admin: 02/12/16 09:17 Dose:  20 mg


Haloperidol Lactate (Haldol)  2.5 mg IM Q6 PRN


   PRN Reason: Agitation


   Last Admin: 02/06/16 10:49 Dose:  2.5 mg


Levetiracetam (Keppra)  500 mg PO BID [facility]


   Last Admin: 02/12/16 09:16 Dose:  500 mg


Lorazepam (Ativan)  1 mg IVP Q4 PRN


   PRN Reason: Agitation


   Last Admin: 02/11/16 12:07 Dose:  1 mg


Metoprolol Tartrate (Lopressor)  100 mg PO Q12 [facility]


   Last Admin: 02/12/16 09:16 Dose:  100 mg


Multivitamins/Vitamin C (Multi-Delyn Liquid)  5 ml PO DAILY [facility]


   Last Admin: 02/12/16 09:16 Dose:  5 ml


Nicotine (Nicoderm Cq)  1 patch TD DAILY [facility]


   Last Admin: 02/12/16 09:16 Dose:  1 patch


Quetiapine Fumarate (Seroquel)  25 mg PO DAILY@1700 [facility]


   Last Admin: 02/11/16 17:39 Dose:  25 mg











- Labs


Labs: 


 





 02/10/16 16:05 





 02/10/16 16:05 





 











PT  11.2 SECONDS (9.4-12.0)   12/14/15  05:20    


 


INR  1.05  (0.89-1.20)   12/14/15  05:20    


 


APTT  36.2 SECONDS (23.1-32.0)  H  12/14/15  05:20    














- Constitutional


Appears: Well, Non-toxic, No Acute Distress





- Head Exam


Head Exam: ATRAUMATIC, NORMAL INSPECTION, NORMOCEPHALIC





- Eye Exam


Eye Exam: EOMI, Normal appearance, PERRL


Pupil Exam: NORMAL ACCOMODATION, PERRL





- ENT Exam


ENT Exam: Mucous Membranes Moist, Normal Exam





- Neck Exam


Neck Exam: Full ROM, Normal Inspection.  absent: Lymphadenopathy





- Respiratory Exam


Respiratory Exam: Clear to Ausculation Bilateral, NORMAL BREATHING PATTERN





- Cardiovascular Exam


Cardiovascular Exam: REGULAR RHYTHM, +S1, +S2.  absent: Murmur





- GI/Abdominal Exam


GI & Abdominal Exam: Soft, Normal Bowel Sounds.  absent: Tenderness





- Extremities Exam


Extremities Exam: Full ROM, Normal Capillary Refill, Normal Inspection.  absent

: Joint Swelling, Pedal Edema





- Back Exam


Back Exam: NORMAL INSPECTION





- Neurological Exam


Neurological Exam: Alert, Awake, CN II-XII Intact, Normal Gait





- Psychiatric Exam


Psychiatric exam: Normal Affect, Normal Mood





- Skin


Skin Exam: Dry, Intact, Normal Color, Warm





Assessment and Plan


(1) DVT prophylaxis


Assessment & Plan: 


scd and aehose, hold lovenox


Status: Acute





(2) Scrotal edema


Status: Chronic





(3) Diabetes mellitus with hyperglycemia


Assessment & Plan: 


fsbg


Status: Chronic





(4) Agitation


Assessment & Plan: 


1:1


ativan, haldol prn


seroquel


Status: Acute





(5) CAD (coronary artery disease)


Assessment & Plan: 


cont meds


Status: Acute





(6) Smoking


Assessment & Plan: 


nicoderm


Status: Acute





(7) Hematuria


Assessment & Plan: 


resolved


urology


Status: Acute





(8) Diverticulitis


Assessment & Plan: 


gi


bland diet


no anbx for now


Status: Acute

## 2016-02-12 NOTE — CP.PCM.PN
Subjective





- Date & Time of Evaluation


Date of Evaluation: 02/12/16


Time of Evaluation: 13:15





- Subjective


Subjective: 


doing same


labs noted





Objective





- Vital Signs/Intake and Output


Vital Signs (last 24 hours): 


 











Temp Pulse Resp BP Pulse Ox


 


 98.1 F   91 H  20   120/60   98 


 


 02/12/16 08:34  02/12/16 09:16  02/12/16 08:34  02/12/16 09:16  02/12/16 08:34











- Medications


Medications: 


 Current Medications





Aspirin (Ecotrin)  [facility] mg PO [facility]ILY [facility]


   Last Admin: 02/12/16 09:18 Dose:  81 mg


Enalapril Maleate (Vasotec)  10 mg PO DAILY [facility]


   Last Admin: 02/12/16 09:18 Dose:  10 mg


Famotidine (Pepcid)  20 mg GT BID [facility]


   Last Admin: 02/12/16 09:17 Dose:  20 mg


Haloperidol Lactate (Haldol)  2.5 mg IM Q6 PRN


   PRN Reason: Agitation


   Last Admin: 02/06/16 10:49 Dose:  2.5 mg


Levetiracetam (Keppra)  500 mg PO BID [facility]


   Last Admin: 02/12/16 09:16 Dose:  500 mg


Lorazepam (Ativan)  1 mg IVP Q4 PRN


   PRN Reason: Agitation


   Last Admin: 02/12/16 13:01 Dose:  1 mg


Metoprolol Tartrate (Lopressor)  100 mg PO Q12 [facility]


   Last Admin: 02/12/16 09:16 Dose:  100 mg


Multivitamins/Vitamin C (Multi-Delyn Liquid)  5 ml PO DAILY [facility]


   Last Admin: 02/12/16 09:16 Dose:  5 ml


Nicotine (Nicoderm Cq)  1 patch TD DAILY [facility]


   Last Admin: 02/12/16 09:16 Dose:  1 patch


Quetiapine Fumarate (Seroquel)  25 mg PO DAILY@1700 [facility]


   Last Admin: 02/11/16 17:39 Dose:  25 mg











- Labs


Labs: 


 





 02/10/16 16:05 





 02/10/16 16:05 





 











PT  11.2 SECONDS (9.4-12.0)   12/14/15  05:20    


 


INR  1.05  (0.89-1.20)   12/14/15  05:20    


 


APTT  36.2 SECONDS (23.1-32.0)  H  12/14/15  05:20    














- Constitutional


Appears: Chronically Ill





- Head Exam


Head Exam: ATRAUMATIC, NORMOCEPHALIC





- Eye Exam


Eye Exam: absent: Scleral icterus





- ENT Exam


ENT Exam: Mucous Membranes Dry





- Neck Exam


Neck Exam: absent: Lymphadenopathy





- Respiratory Exam


Respiratory Exam: Decreased Breath Sounds, NORMAL BREATHING PATTERN





- Cardiovascular Exam


Cardiovascular Exam: REGULAR RHYTHM, +S1, +S2





- GI/Abdominal Exam


GI & Abdominal Exam: Distended, Soft.  absent: Tenderness





Assessment and Plan


(1) Agitation


Status: Acute





(2) Diabetes mellitus with hyperglycemia


Status: Chronic





(3) History of seizure


Status: Chronic





(4) NSTEMI (non-ST elevated myocardial infarction)


Status: Acute

## 2016-02-13 RX ADMIN — Medication SCH ML: at 09:15

## 2016-02-13 NOTE — CP.PCM.PN
Subjective





- Date & Time of Evaluation


Date of Evaluation: 02/13/16


Time of Evaluation: 08:07





- Subjective


Subjective: 


doing well, no complaints. no f/c, n/v/d. no hematuria. no distress. 1:1 for 

safety


ros negative except intermittent aggitation





Objective





- Vital Signs/Intake and Output


Vital Signs (last 24 hours): 


 











Temp Pulse Resp BP Pulse Ox


 


 98.2 F   76   20   147/80   99 


 


 02/12/16 16:29  02/12/16 22:44  02/12/16 16:29  02/12/16 22:44  02/12/16 16:29











- Medications


Medications: 


 Current Medications





Aspirin (Ecotrin)  81 mg PO DAILY [facility]


   Last Admin: 02/12/16 09:18 Dose:  81 mg


Enalapril Maleate (Vasotec)  10 mg PO DAILY [facility]


   Last Admin: 02/12/16 09:18 Dose:  10 mg


Famotidine (Pepcid)  20 mg GT BID [facility]


   Last Admin: 02/12/16 16:30 Dose:  20 mg


Haloperidol Lactate (Haldol)  2.5 mg IM Q6 PRN


   PRN Reason: Agitation


   Last Admin: 02/06/16 10:49 Dose:  2.5 mg


Levetiracetam (Keppra)  500 mg PO BID [facility]


   Last Admin: 02/12/16 16:29 Dose:  500 mg


Lorazepam (Ativan)  1 mg IVP Q4 PRN


   PRN Reason: Agitation


   Last Admin: 02/12/16 13:01 Dose:  1 mg


Metoprolol Tartrate (Lopressor)  100 mg PO Q12 [facility]


   Last Admin: 02/12/16 22:44 Dose:  100 mg


Multivitamins/Vitamin C (Multi-Delyn Liquid)  5 ml PO DAILY [facility]


   Last Admin: 02/12/16 09:16 Dose:  5 ml


Nicotine (Nicoderm Cq)  1 patch TD DAILY [facility]


   Last Admin: 02/12/16 09:16 Dose:  1 patch


Quetiapine Fumarate (Seroquel)  25 mg PO DAILY@1700 [facility]


   Last Admin: 02/12/16 16:29 Dose:  25 mg











- Labs


Labs: 


 





 02/10/16 16:05 





 02/10/16 16:05 





 











PT  11.2 SECONDS (9.4-12.0)   12/14/15  05:20    


 


INR  1.05  (0.89-1.20)   12/14/15  05:20    


 


APTT  36.2 SECONDS (23.1-32.0)  H  12/14/15  05:20    














- Constitutional


Appears: Well, Non-toxic, No Acute Distress, Chronically Ill





- Head Exam


Head Exam: ATRAUMATIC, NORMAL INSPECTION, NORMOCEPHALIC





- Eye Exam


Eye Exam: EOMI, Normal appearance, PERRL


Pupil Exam: NORMAL ACCOMODATION, PERRL





- ENT Exam


ENT Exam: Mucous Membranes Moist, Normal Exam





- Neck Exam


Neck Exam: Full ROM, Normal Inspection.  absent: Lymphadenopathy





- Respiratory Exam


Respiratory Exam: Clear to Ausculation Bilateral, NORMAL BREATHING PATTERN





- Cardiovascular Exam


Cardiovascular Exam: REGULAR RHYTHM, +S1, +S2.  absent: Murmur





- GI/Abdominal Exam


GI & Abdominal Exam: Soft, Normal Bowel Sounds.  absent: Tenderness





- Extremities Exam


Extremities Exam: Full ROM, Normal Capillary Refill, Normal Inspection.  absent

: Joint Swelling, Pedal Edema





- Back Exam


Back Exam: NORMAL INSPECTION





- Neurological Exam


Neurological Exam: Alert, Altered, Awake, CN II-XII Intact, Normal Gait





- Psychiatric Exam


Psychiatric exam: Normal Affect, Normal Mood





- Skin


Skin Exam: Dry, Intact, Normal Color, Warm





Assessment and Plan


(1) DVT prophylaxis


Assessment & Plan: 


scd and ae hose


no lovenox


Status: Acute





(2) Scrotal edema


Status: Chronic





(3) Diabetes mellitus with hyperglycemia


Assessment & Plan: 


fsbg


Status: Chronic





(4) Agitation


Assessment & Plan: 


1:1


ativan/haldol prn


seroquel


Status: Acute





(5) CAD (coronary artery disease)


Assessment & Plan: 


cont meds


Status: Acute





(6) Smoking


Assessment & Plan: 


nicoderm


Status: Acute





(7) Hematuria


Assessment & Plan: 


urology


hold lovenox


Status: Acute





(8) Diverticulitis


Assessment & Plan: 


altered gi diet


gi





Status: Acute

## 2016-02-14 RX ADMIN — Medication SCH ML: at 09:20

## 2016-02-14 NOTE — CP.PCM.PN
Subjective





- Date & Time of Evaluation


Date of Evaluation: 02/14/16


Time of Evaluation: 09:00





- Subjective


Subjective: 


NO CHANGE





Objective





- Vital Signs/Intake and Output


Vital Signs (last 24 hours): 


 











Temp Pulse Resp BP Pulse Ox


 


 98.2 F   82   20   102/65   100 


 


 02/14/16 09:05  02/14/16 09:24  02/14/16 09:05  02/14/16 09:24  02/14/16 09:05











- Medications


Medications: 


 Current Medications





Aspirin (Ecotrin)  81 mg PO DAILY [facility]


   Last Admin: 02/14/16 09:19 Dose:  81 mg


Enalapril Maleate (Vasotec)  10 mg PO DAILY [facility]


   Last Admin: 02/14/16 09:23 Dose:  Not Given


Famotidine (Pepcid)  20 mg GT BID [facility]


   Last Admin: 02/14/16 09:19 Dose:  20 mg


Haloperidol Lactate (Haldol)  2.5 mg IM Q6 PRN


   PRN Reason: Agitation


   Last Admin: 02/06/16 10:49 Dose:  2.5 mg


Levetiracetam (Keppra)  500 mg PO BID [facility]


   Last Admin: 02/14/16 09:19 Dose:  500 mg


Lorazepam (Ativan)  1 mg IVP Q4 PRN


   PRN Reason: Agitation


   Last Admin: 02/12/16 13:01 Dose:  1 mg


Metoprolol Tartrate (Lopressor)  100 mg PO Q12 [facility]


   Last Admin: 02/14/16 09:24 Dose:  Not Given


Multivitamins/Vitamin C (Multi-Delyn Liquid)  5 ml PO DAILY [facility]


   Last Admin: 02/14/16 09:20 Dose:  5 ml


Nicotine (Nicoderm Cq)  1 patch TD DAILY [facility]


   Last Admin: 02/14/16 09:20 Dose:  1 patch


Quetiapine Fumarate (Seroquel)  25 mg PO DAILY@1700 [facility]


   Last Admin: 02/13/16 16:35 Dose:  25 mg











- Labs


Labs: 


 





 02/10/16 16:05 





 02/10/16 16:05 





 











PT  11.2 SECONDS (9.4-12.0)   12/14/15  05:20    


 


INR  1.05  (0.89-1.20)   12/14/15  05:20    


 


APTT  36.2 SECONDS (23.1-32.0)  H  12/14/15  05:20    














Assessment and Plan


(1) Agitation


Status: Acute





(2) Diabetes mellitus with hyperglycemia


Status: Chronic





(3) History of seizure


Status: Chronic





(4) NSTEMI (non-ST elevated myocardial infarction)


Status: Acute

## 2016-02-15 RX ADMIN — Medication SCH ML: at 08:48

## 2016-02-15 NOTE — CP.PCM.PN
Subjective





- Date & Time of Evaluation


Date of Evaluation: 02/15/16


Time of Evaluation: 08:07





- Subjective


Subjective: 


pt comfortable in bed, no distress, no complaints. no f/c, n/v/d. no further 

hematuria.


tolerating diet


ros negative except intermittent agitation





Objective





- Vital Signs/Intake and Output


Vital Signs (last 24 hours): 


 











Temp Pulse Resp BP Pulse Ox


 


 97.7 F   83   20   146/78   96 


 


 02/14/16 17:06  02/14/16 21:13  02/14/16 17:06  02/14/16 21:13  02/14/16 17:06











- Medications


Medications: 


 Current Medications





Aspirin (Ecotrin)  81 mg PO DAILY [facility]


   Last Admin: 02/14/16 09:19 Dose:  81 mg


Enalapril Maleate (Vasotec)  10 mg PO DAILY [facility]


   Last Admin: 02/14/16 09:23 Dose:  Not Given


Famotidine (Pepcid)  20 mg GT BID [facility]


   Last Admin: 02/14/16 16:29 Dose:  20 mg


Haloperidol Lactate (Haldol)  2.5 mg IM Q6 PRN


   PRN Reason: Agitation


   Last Admin: 02/06/16 10:49 Dose:  2.5 mg


Levetiracetam (Keppra)  500 mg PO BID [facility]


   Last Admin: 02/14/16 16:29 Dose:  500 mg


Lorazepam (Ativan)  1 mg IVP Q4 PRN


   PRN Reason: Agitation


   Last Admin: 02/12/16 13:01 Dose:  1 mg


Metoprolol Tartrate (Lopressor)  100 mg PO Q12 [facility]


   Last Admin: 02/14/16 21:13 Dose:  100 mg


Multivitamins/Vitamin C (Multi-Delyn Liquid)  5 ml PO DAILY [facility]


   Last Admin: 02/14/16 09:20 Dose:  5 ml


Nicotine (Nicoderm Cq)  1 patch TD DAILY [facility]


   Last Admin: 02/14/16 09:20 Dose:  1 patch


Quetiapine Fumarate (Seroquel)  25 mg PO DAILY@1700 [facility]


   Last Admin: 02/14/16 16:30 Dose:  25 mg











- Labs


Labs: 


 





 02/10/16 16:05 





 02/10/16 16:05 





 











PT  11.2 SECONDS (9.4-12.0)   12/14/15  05:20    


 


INR  1.05  (0.89-1.20)   12/14/15  05:20    


 


APTT  36.2 SECONDS (23.1-32.0)  H  12/14/15  05:20    














- Constitutional


Appears: Well, Non-toxic, No Acute Distress, Chronically Ill





- Head Exam


Head Exam: ATRAUMATIC, NORMAL INSPECTION, NORMOCEPHALIC





- Eye Exam


Eye Exam: EOMI, Normal appearance, PERRL


Pupil Exam: NORMAL ACCOMODATION, PERRL





- ENT Exam


ENT Exam: Mucous Membranes Moist, Normal Exam





- Neck Exam


Neck Exam: Full ROM, Normal Inspection.  absent: Lymphadenopathy





- Respiratory Exam


Respiratory Exam: Clear to Ausculation Bilateral, NORMAL BREATHING PATTERN





- Cardiovascular Exam


Cardiovascular Exam: REGULAR RHYTHM, +S1, +S2.  absent: Murmur





- GI/Abdominal Exam


GI & Abdominal Exam: Soft, Normal Bowel Sounds.  absent: Tenderness





- Extremities Exam


Extremities Exam: Full ROM, Normal Capillary Refill, Normal Inspection.  absent

: Joint Swelling, Pedal Edema





- Back Exam


Back Exam: NORMAL INSPECTION





- Neurological Exam


Neurological Exam: Alert, Awake, CN II-XII Intact, Normal Gait, Oriented x3





- Psychiatric Exam


Psychiatric exam: Normal Affect, Normal Mood





- Skin


Skin Exam: Dry, Intact, Normal Color, Warm





Assessment and Plan


(1) DVT prophylaxis


Assessment & Plan: 


scd and ae hose, no lovenox


Status: Acute





(2) Scrotal edema


Status: Chronic





(3) Diabetes mellitus with hyperglycemia


Assessment & Plan: 


fsbg


Status: Chronic





(4) Agitation


Assessment & Plan: 


1:1


seroquel


ativan/haldol prn


Status: Acute





(5) CAD (coronary artery disease)


Assessment & Plan: 


cont meds


Status: Acute





(6) Smoking


Assessment & Plan: 


nicoderm


Status: Acute





(7) Hematuria


Assessment & Plan: 


appears to be resolved


uro


no lovenox


Status: Acute





(8) Diverticulitis


Assessment & Plan: 


diet as tolerated


gi


Status: Acute

## 2016-02-16 RX ADMIN — Medication SCH ML: at 09:09

## 2016-02-16 NOTE — CP.PCM.PN
Subjective





- Date & Time of Evaluation


Date of Evaluation: 02/16/16


Time of Evaluation: 07:18





- Subjective


Subjective: 


pt remains as assessed. still w/ intermittent cp w/o dyspnea, f/c, n/v/d, 

weakness, numbnesstingling. no abd pain. no dysuria, hematuria.  


ros negative except agitation and intermittent cp


pt remains not a candidate for intervention by cardio





Objective





- Vital Signs/Intake and Output


Vital Signs (last 24 hours): 


 











Temp Pulse Resp BP Pulse Ox


 


 98.4 F   78   20   121/66   98 


 


 02/16/16 00:16  02/16/16 00:16  02/16/16 00:16  02/16/16 00:16  02/16/16 00:16











- Medications


Medications: 


 Current Medications





Aspirin (Ecotrin)  81 mg PO DAILY [facility]


   Last Admin: 02/15/16 08:46 Dose:  81 mg


Enalapril Maleate (Vasotec)  10 mg PO DAILY [facility]


   Last Admin: 02/15/16 08:49 Dose:  10 mg


Famotidine (Pepcid)  20 mg GT BID [facility]


   Last Admin: 02/15/16 16:59 Dose:  20 mg


Haloperidol Lactate (Haldol)  2.5 mg IM Q6 PRN


   PRN Reason: Agitation


   Last Admin: 02/06/16 10:49 Dose:  2.5 mg


Levetiracetam (Keppra)  500 mg PO BID [facility]


   Last Admin: 02/15/16 16:59 Dose:  500 mg


Lorazepam (Ativan)  1 mg IVP Q4 PRN


   PRN Reason: Agitation


   Last Admin: 02/15/16 16:59 Dose:  1 mg


Metoprolol Tartrate (Lopressor)  100 mg PO Q12 [facility]


   Last Admin: 02/15/16 21:48 Dose:  Not Given


Multivitamins/Vitamin C (Multi-Delyn Liquid)  5 ml PO DAILY [facility]


   Last Admin: 02/15/16 08:48 Dose:  5 ml


Nicotine (Nicoderm Cq)  1 patch TD DAILY [facility]


   Last Admin: 02/15/16 08:48 Dose:  1 patch


Quetiapine Fumarate (Seroquel)  25 mg PO DAILY@1700 [facility]


   Last Admin: 02/15/16 17:00 Dose:  25 mg











- Labs


Labs: 


 





 02/10/16 16:05 





 02/10/16 16:05 





 











PT  11.2 SECONDS (9.4-12.0)   12/14/15  05:20    


 


INR  1.05  (0.89-1.20)   12/14/15  05:20    


 


APTT  36.2 SECONDS (23.1-32.0)  H  12/14/15  05:20    














- Constitutional


Appears: Well, Non-toxic, No Acute Distress, Confused, Chronically Ill





- Head Exam


Head Exam: ATRAUMATIC, NORMAL INSPECTION, NORMOCEPHALIC





- Eye Exam


Eye Exam: EOMI, Normal appearance, PERRL


Pupil Exam: NORMAL ACCOMODATION, PERRL





- ENT Exam


ENT Exam: Mucous Membranes Moist, Normal Exam





- Neck Exam


Neck Exam: Full ROM, Normal Inspection.  absent: Lymphadenopathy





- Respiratory Exam


Respiratory Exam: Clear to Ausculation Bilateral, NORMAL BREATHING PATTERN





- Cardiovascular Exam


Cardiovascular Exam: REGULAR RHYTHM, +S1, +S2.  absent: Murmur





- GI/Abdominal Exam


GI & Abdominal Exam: Soft, Normal Bowel Sounds.  absent: Tenderness





- Extremities Exam


Extremities Exam: Full ROM, Normal Capillary Refill, Normal Inspection.  absent

: Joint Swelling, Pedal Edema





- Back Exam


Back Exam: NORMAL INSPECTION





- Neurological Exam


Neurological Exam: Alert, Awake, CN II-XII Intact, Normal Gait





- Psychiatric Exam


Psychiatric exam: Normal Affect, Normal Mood





- Skin


Skin Exam: Dry, Intact, Normal Color, Warm





Assessment and Plan


(1) DVT prophylaxis


Assessment & Plan: 


scd and ae hose


lovenox when cleared by uro


Status: Acute





(2) Scrotal edema


Status: Chronic





(3) Diabetes mellitus with hyperglycemia


Assessment & Plan: 


fsbg


diet control


Status: Chronic





(4) Agitation


Assessment & Plan: 


seroquel


1:1


haldol, ativan prn


Status: Acute





(5) CAD (coronary artery disease)


Assessment & Plan: 


cont meds


cardio


Status: Acute





(6) Smoking


Assessment & Plan: 


nicoderm


Status: Acute





(7) Hematuria


Assessment & Plan: 


resolved


monitor


hold lovenox


Status: Acute





(8) Diverticulitis


Assessment & Plan: 


gi


bland diet


Status: Acute

## 2016-02-17 RX ADMIN — Medication SCH ML: at 09:00

## 2016-02-17 NOTE — CP.PCM.PN
Subjective





- Date & Time of Evaluation


Date of Evaluation: 02/17/16


Time of Evaluation: 07:00





- Subjective


Subjective: 


remains as assessed, no pain, distres, f/c, n/v/d. no blood in urine or stool


remains on 1:1 for agitation


full consult dictated [id number]


cont all thearpies


pending guardian





Objective





- Vital Signs/Intake and Output


Vital Signs (last 24 hours): 


 











Temp Pulse Resp BP Pulse Ox


 


 97.7 F   68   20   122/73   100 


 


 02/16/16 17:13  02/16/16 22:05  02/16/16 17:13  02/16/16 22:05  02/16/16 17:13











- Medications


Medications: 


 Current Medications





Aspirin (Ecotrin)  [facility]


   Last Admin: 02/16/16 09:08 Dose:  81 mg


Enalapril Maleate (Vasotec)  10 mg PO DAILY [facility]


   Last Admin: 02/16/16 16:44 Dose:  10 mg


Famotidine (Pepcid)  20 mg GT BID [facility]


   Last Admin: 02/16/16 16:40 Dose:  20 mg


Haloperidol Lactate (Haldol)  2.5 mg IM Q6 PRN


   PRN Reason: Agitation


   Last Admin: 02/06/16 10:49 Dose:  2.5 mg


Levetiracetam (Keppra)  500 mg PO BID [facility]


   Last Admin: 02/16/16 16:40 Dose:  500 mg


Lorazepam (Ativan)  1 mg IVP Q4 PRN


   PRN Reason: Agitation


   Last Admin: 02/16/16 10:51 Dose:  1 mg


Metoprolol Tartrate (Lopressor)  100 mg PO Q12 [facility]


   Last Admin: 02/16/16 22:05 Dose:  100 mg


Multivitamins/Vitamin C (Multi-Delyn Liquid)  5 ml PO DAILY [facility]


   Last Admin: 02/16/16 09:09 Dose:  5 ml


Nicotine (Nicoderm Cq)  1 patch TD DAILY [facility]


   Last Admin: 02/16/16 09:09 Dose:  1 patch


Quetiapine Fumarate (Seroquel)  25 mg PO DAILY@1700 [facility]


   Last Admin: 02/16/16 16:41 Dose:  25 mg











- Labs


Labs: 


 





 02/10/16 16:05 





 02/10/16 16:05 





 











PT  11.2 SECONDS (9.4-12.0)   12/14/15  05:20    


 


INR  1.05  (0.89-1.20)   12/14/15  05:20    


 


APTT  36.2 SECONDS (23.1-32.0)  H  12/14/15  05:20    














Assessment and Plan


(1) DVT prophylaxis


Status: Acute





(2) Scrotal edema


Status: Chronic





(3) Diabetes mellitus with hyperglycemia


Status: Chronic





(4) Agitation


Status: Acute





(5) CAD (coronary artery disease)


Status: Acute





(6) Smoking


Status: Acute





(7) Hematuria


Status: Acute





(8) Diverticulitis


Status: Acute

## 2016-02-17 NOTE — PN
DATE: 02/17/2016



The patient in bed sleeping, in no distress.  Still with periods of agitation necessitating a 1:1 for
 this patient.  No complaints offered.  No fever, chills, nausea, vomiting, or diarrhea.  No blood in
 urine or stool.  No abdominal pain.  Tolerating p.o. well.



REVIEW OF SYSTEMS:  Normal except for intermittent agitation.



PHYSICAL EXAMINATION:

GENERAL:  Alert and oriented to person and place.

LUNGS:  Clear to auscultation in all fields.  Respirations even and unlabored.

HEART:  Regular rate and rhythm.  No murmurs, rubs or gallops.

ABDOMEN:  Soft, nontender.  Bowel sounds present x 4.

EXTREMITIES:  Distal pulses and motor sensation intact.  Cap refill is brisk.  Negative Homans sign.



DIAGNOSES:  Coronary artery disease, agitation, smoking addiction, hematuria, diverticulitis, deep ve
nous thrombosis prophylaxis.  



PLAN:  We will continue all present medicines for CAD.  Nicoderm patch for smoking.  Will continue bl
and diet.  GI consult for diverticulitis.  Will continue to hold Lovenox for hematuria and follow up 
with urology.  SCD and AE hose for DVT prophylaxis.  Continuing to follow with  for gu
ardianship and placement.  Will continue 1:1 for patient's safety.  Ativan and Haldol as needed for a
gitation.  Seroquel daily for agitation.





__________________________________________

Fan SILVER







cc:



DD: 02/17/2016 08:27:15  [id number]

TT: 02/17/2016 08:41:44

Confirmation # [id number]

Dictation # [id number]

mn

## 2016-02-18 RX ADMIN — Medication SCH ML: at 09:19

## 2016-02-18 NOTE — CP.PCM.PN
Subjective





- Date & Time of Evaluation


Date of Evaluation: 02/18/16


Time of Evaluation: 07:23





- Subjective


Subjective: 


pt remains as assessed. per rn pt has been becoming more alert and is holding 

conversations.  decr agitation but still has periods necesitating a 1:1. no f/c

, n/v/d no blood in urine/stool


ros negative excetp intermittent agitation





Objective





- Vital Signs/Intake and Output


Vital Signs (last 24 hours): 


 











Temp Pulse Resp BP Pulse Ox


 


 97.3 F L  52 L  20   143/78   99 


 


 02/18/16 04:04  02/18/16 04:04  02/18/16 04:04  02/18/16 04:04  02/18/16 04:04











- Medications


Medications: 


 Current Medications





Aspirin (Ecotrin)  81 mg PO DAILY [facility]


   Last Admin: 02/17/16 09:00 Dose:  81 mg


Enalapril Maleate (Vasotec)  10 mg PO DAILY [facility]


   Last Admin: 02/17/16 09:00 Dose:  10 mg


Famotidine (Pepcid)  20 mg GT BID [facility]


   Last Admin: 02/17/16 16:25 Dose:  20 mg


Haloperidol Lactate (Haldol)  2.5 mg IM Q6 PRN


   PRN Reason: Agitation


   Last Admin: 02/06/16 10:49 Dose:  2.5 mg


Levetiracetam (Keppra)  500 mg PO BID [facility]


   Last Admin: 02/17/16 16:26 Dose:  500 mg


Lorazepam (Ativan)  1 mg IVP Q4 PRN


   PRN Reason: Agitation


   Last Admin: 02/16/16 10:51 Dose:  1 mg


Metoprolol Tartrate (Lopressor)  100 mg PO Q12 [facility]


   Last Admin: 02/17/16 20:59 Dose:  Not Given


Multivitamins/Vitamin C (Multi-Delyn Liquid)  5 ml PO DAILY [facility]


   Last Admin: 02/17/16 09:00 Dose:  5 ml


Nicotine (Nicoderm Cq)  1 patch TD DAILY [facility]


   Last Admin: 02/17/16 09:00 Dose:  1 patch


Quetiapine Fumarate (Seroquel)  25 mg PO DAILY@1700 [facility]


   Last Admin: 02/17/16 16:26 Dose:  25 mg











- Labs


Labs: 


 





 02/10/16 16:05 





 02/10/16 16:05 





 











PT  11.2 SECONDS (9.4-12.0)   12/14/15  05:20    


 


INR  1.05  (0.89-1.20)   12/14/15  05:20    


 


APTT  36.2 SECONDS (23.1-32.0)  H  12/14/15  05:20    














- Constitutional


Appears: Well, Non-toxic, No Acute Distress, Confused, Chronically Ill





- Head Exam


Head Exam: ATRAUMATIC, NORMAL INSPECTION, NORMOCEPHALIC





- Eye Exam


Eye Exam: EOMI, Normal appearance, PERRL


Pupil Exam: NORMAL ACCOMODATION, PERRL





- ENT Exam


ENT Exam: Mucous Membranes Moist, Normal Exam





- Neck Exam


Neck Exam: Full ROM, Normal Inspection.  absent: Lymphadenopathy





- Respiratory Exam


Respiratory Exam: Clear to Ausculation Bilateral, NORMAL BREATHING PATTERN





- Cardiovascular Exam


Cardiovascular Exam: REGULAR RHYTHM, +S1, +S2.  absent: Murmur





- GI/Abdominal Exam


GI & Abdominal Exam: Soft, Normal Bowel Sounds.  absent: Tenderness





-  Exam


Bimanual exam: NORMAL BIMANUAL EXAM





- Extremities Exam


Extremities Exam: Full ROM, Normal Capillary Refill, Normal Inspection.  absent

: Joint Swelling, Pedal Edema





- Back Exam


Back Exam: NORMAL INSPECTION





- Neurological Exam


Neurological Exam: Alert, Awake, CN II-XII Intact, Normal Gait





- Psychiatric Exam


Psychiatric exam: Normal Affect, Normal Mood





- Skin


Skin Exam: Dry, Intact, Normal Color, Warm





Assessment and Plan


(1) DVT prophylaxis


Assessment & Plan: 


scd and ae hose


cont to hold lovenox


ambulation


Status: Acute





(2) Scrotal edema


Status: Chronic





(3) Diabetes mellitus with hyperglycemia


Assessment & Plan: 


fsbg


Status: Chronic





(4) Agitation


Assessment & Plan: 


1:1


seroquel


ativan/haldol prn


Status: Acute





(5) CAD (coronary artery disease)


Assessment & Plan: 


cont meds


Status: Acute





(6) Smoking


Assessment & Plan: 


nicoderm


Status: Chronic





(7) Hematuria


Status: Resolved





(8) Diverticulitis


Assessment & Plan: 


bland diet


gi


Status: Acute

## 2016-02-19 RX ADMIN — Medication SCH ML: at 10:47

## 2016-02-19 NOTE — CP.PCM.PN
Subjective





- Date & Time of Evaluation


Date of Evaluation: 02/18/16


Time of Evaluation: 02:15





- Subjective


Subjective: 





Pt seen at the bedside. This is a follow up to altered mental status. 





He slowly makes eye contact with writer and but his mind seems to drift. I 

constantly


need to call his name in attempt to acquire his attention. He makes a drowsy nod


when asked about his mood. He responds with the words "better" on two occasions 


when I ask him questions. He eventually looks in the direction of the window 

and begin


grabbing with his index finger and thumb in the air. 





limited grooming


bizarre behavior


poor impulse control


poverty of speech


cannot assess mood


inappropriate affect


severe loose associations


illogical thought content





Medical Decision Making





-Continue Seroquel


-Pt remains with severe neurocognitive impairment and does not thave the mental 

capacity to consent to 


 leaving the hospital 





Objective





- Vital Signs/Intake and Output


Vital Signs (last 24 hours): 


 











Temp Pulse Resp BP Pulse Ox


 


 98.2 F   72   20   123/84   100 


 


 02/19/16 08:21  02/19/16 08:21  02/19/16 08:21  02/19/16 08:21  02/19/16 08:21











- Medications


Medications: 


 Current Medications





Aspirin (Ecotrin)  81 mg PO DAILY [facility]


   Last Admin: 02/18/16 09:16 Dose:  81 mg


Enalapril Maleate (Vasotec)  10 mg PO DAILY [facility]


   Last Admin: 02/18/16 17:20 Dose:  10 mg


Famotidine (Pepcid)  20 mg GT BID [facility]


   Last Admin: 02/18/16 17:24 Dose:  20 mg


Haloperidol Lactate (Haldol)  2.5 mg IM Q6 PRN


   PRN Reason: Agitation


   Last Admin: 02/06/16 10:49 Dose:  2.5 mg


Levetiracetam (Keppra)  500 mg PO BID [facility]


   Last Admin: 02/18/16 17:22 Dose:  500 mg


Lorazepam (Ativan)  1 mg IVP Q4 PRN


   PRN Reason: Agitation


   Last Admin: 02/16/16 10:51 Dose:  1 mg


Metoprolol Tartrate (Lopressor)  50 mg PO Q12 [facility]


   Last Admin: 02/18/16 21:40 Dose:  50 mg


Multivitamins/Vitamin C (Multi-Delyn Liquid)  5 ml PO DAILY [facility]


   Last Admin: 02/18/16 09:19 Dose:  5 ml


Nicotine (Nicoderm Cq)  1 patch TD DAILY [facility]


   Last Admin: 02/18/16 09:19 Dose:  1 patch


Quetiapine Fumarate (Seroquel)  25 mg PO DAILY@1700 [facility]


   Last Admin: 02/18/16 17:22 Dose:  25 mg











- Labs


Labs: 


 





 02/10/16 16:05 





 02/10/16 16:05 





 











PT  11.2 SECONDS (9.4-12.0)   12/14/15  05:20    


 


INR  1.05  (0.89-1.20)   12/14/15  05:20    


 


APTT  36.2 SECONDS (23.1-32.0)  H  12/14/15  05:20

## 2016-02-19 NOTE — CP.PCM.PN
Subjective





- Date & Time of Evaluation


Date of Evaluation: 02/19/16


Time of Evaluation: 11:36





- Subjective


Subjective: 


pt remains as assessed, calm at present but w/ periods of agitation requiring 1:

1.  pt still incompetent as per psych.


ros negative except agitation


no complaints offered. 





Objective





- Vital Signs/Intake and Output


Vital Signs (last 24 hours): 


 











Temp Pulse Resp BP Pulse Ox


 


 98.2 F   72   20   123/74   100 


 


 02/19/16 08:21  02/19/16 10:46  02/19/16 08:21  02/19/16 10:46  02/19/16 08:21











- Medications


Medications: 


 Current Medications





Aspirin (Ecotrin)  81 mg PO DAILY [facility]


   Last Admin: 02/19/16 10:45 Dose:  81 mg


Enalapril Maleate (Vasotec)  10 mg PO DAILY [facility]


   Last Admin: 02/19/16 10:50 Dose:  10 mg


Famotidine (Pepcid)  20 mg GT BID [facility]


   Last Admin: 02/19/16 10:48 Dose:  20 mg


Haloperidol Lactate (Haldol)  2.5 mg IM Q6 PRN


   PRN Reason: Agitation


   Last Admin: 02/06/16 10:49 Dose:  2.5 mg


Levetiracetam (Keppra)  500 mg PO BID [facility]


   Last Admin: 02/19/16 10:45 Dose:  500 mg


Lorazepam (Ativan)  1 mg IVP Q4 PRN


   PRN Reason: Agitation


   Last Admin: 02/16/16 10:51 Dose:  1 mg


Metoprolol Tartrate (Lopressor)  50 mg PO Q12 [facility]


   Last Admin: 02/19/16 10:46 Dose:  50 mg


Multivitamins/Vitamin C (Multi-Delyn Liquid)  5 ml PO DAILY [facility]


   Last Admin: 02/19/16 10:47 Dose:  5 ml


Nicotine (Nicoderm Cq)  1 patch TD DAILY [facility]


   Last Admin: 02/19/16 10:47 Dose:  1 patch


Quetiapine Fumarate (Seroquel)  25 mg PO DAILY@1700 [facility]


   Last Admin: 02/18/16 17:22 Dose:  25 mg











- Labs


Labs: 


 





 02/10/16 16:05 





 02/10/16 16:05 





 











PT  11.2 SECONDS (9.4-12.0)   12/14/15  05:20    


 


INR  1.05  (0.89-1.20)   12/14/15  05:20    


 


APTT  36.2 SECONDS (23.1-32.0)  H  12/14/15  05:20    














- Constitutional


Appears: Well, Non-toxic, No Acute Distress, Confused, Chronically Ill





- Head Exam


Head Exam: ATRAUMATIC, NORMAL INSPECTION, NORMOCEPHALIC





- Eye Exam


Eye Exam: EOMI, Normal appearance, PERRL


Pupil Exam: NORMAL ACCOMODATION, PERRL





- ENT Exam


ENT Exam: Mucous Membranes Moist, Normal Exam





- Neck Exam


Neck Exam: Full ROM, Normal Inspection.  absent: Lymphadenopathy





- Respiratory Exam


Respiratory Exam: Clear to Ausculation Bilateral, NORMAL BREATHING PATTERN





- Cardiovascular Exam


Cardiovascular Exam: REGULAR RHYTHM, +S1, +S2.  absent: Murmur





- GI/Abdominal Exam


GI & Abdominal Exam: Soft, Normal Bowel Sounds.  absent: Tenderness





- Extremities Exam


Extremities Exam: Full ROM, Normal Capillary Refill, Normal Inspection.  absent

: Joint Swelling, Pedal Edema





- Back Exam


Back Exam: NORMAL INSPECTION





- Neurological Exam


Neurological Exam: Alert, Awake, CN II-XII Intact, Normal Gait, Oriented x3





- Psychiatric Exam


Psychiatric exam: Normal Affect, Normal Mood





- Skin


Skin Exam: Dry, Intact, Normal Color, Warm





Assessment and Plan


(1) DVT prophylaxis


Assessment & Plan: 


scd and ae hose


no lovenox


ambulation


Status: Acute





(2) Scrotal edema


Status: Chronic





(3) Diabetes mellitus with hyperglycemia


Assessment & Plan: 


fsbg


diet


Status: Chronic





(4) Agitation


Assessment & Plan: 


1:1


seroquel


ativan/haldol prn


Status: Acute





(5) CAD (coronary artery disease)


Assessment & Plan: 


cont meds


Status: Acute





(6) Smoking


Assessment & Plan: 


nicoderm


Status: Chronic





(7) Diverticulitis


Assessment & Plan: 


bland diet


gi


no clinical consequence


Status: Acute

## 2016-02-20 RX ADMIN — Medication SCH ML: at 12:24

## 2016-02-20 RX ADMIN — DIVALPROEX SODIUM SCH MG: 500 TABLET, DELAYED RELEASE ORAL at 16:27

## 2016-02-20 RX ADMIN — Medication SCH: at 11:33

## 2016-02-20 NOTE — CP.PCM.CON
History of Present Illness





- History of Present Illness


History of Present Illness: 


psychiatry follow up





met with pt who was being restrained by security. met with branden cardoso; 

and the pt's RN. pt had become agitated, combative and yelling at staff in 

N[place]ian that "i am in custodial" he is apparently s/p mi and awaiting a state 

appointed guardian.  he receives seroquel 25mg daily. pt has not been 

exhibiting sedation and has periods where he is clear and coherent per staff.





mse: pt is loud, combative, paranoid and with grossly impaired i/j. he is not 

answering questions appropriately.





assessment:


pt with agression. delirium vs a primary mood disorder





plan:


will add depakote 500mg bid and observe for sedation/side effects


check valproic acid level after 3 full days of treatment


to consider increasing the dose of seroquel


will increase the dose of prn haldol





Past Patient History





- Past Medical History & Family History


Past Medical History?: Yes





- Past Social History


Smoking Status: Heavy Smoker > 10 Cigarettes Daily





- CARDIAC


Hx Cardiac Disorders: Yes





- NEUROLOGICAL


Hx Neurological Disorder: Yes (seizure)


Hx Seizures: Yes





- HEENT


Hx HEENT Problems: No





- RENAL


Hx Chronic Kidney Disease: No





- ENDOCRINE/METABOLIC


Hx Endocrine Disorders: No





- HEMATOLOGICAL/ONCOLOGICAL


Hx Blood Disorders: No





- INTEGUMENTARY


Hx Dermatological Problems: No





- MUSCULOSKELETAL/RHEUMATOLOGICAL


Hx Falls: No





- GENITOURINARY/GYNECOLOGICAL


Hx Genitourinary Disorders: No





- PSYCHIATRIC


Hx Substance Use: No





Meds


Allergies/Adverse Reactions: 


 Allergies











Allergy/AdvReac Type Severity Reaction Status Date / Time


 


Unobtainable Allergy   Verified 12/08/15 09:37














- Medications


Medications: 


 Current Medications





Aspirin (Ecotrin)  81 mg PO DAILY [facility]


   Last Admin: 02/20/16 11:33 Dose:  Not Given


Divalproex Sodium (Depakote Dr(*Bid*))  500 mg PO BID [facility]


Enalapril Maleate (Vasotec)  10 mg PO DAILY [facility]


   Last Admin: 02/20/16 11:35 Dose:  Not Given


Famotidine (Pepcid)  20 mg GT BID [facility]


   Last Admin: 02/20/16 11:35 Dose:  Not Given


Haloperidol Lactate (Haldol)  2.5 mg IM Q6 PRN


   PRN Reason: Agitation


   Last Admin: 02/20/16 09:45 Dose:  2.5 mg


Levetiracetam (Keppra)  500 mg PO BID [facility]


   Last Admin: 02/19/16 16:30 Dose:  500 mg


Lorazepam (Ativan)  1 mg IVP Q4 PRN


   PRN Reason: Agitation


   Last Admin: 02/20/16 09:55 Dose:  1 mg


Metoprolol Tartrate (Lopressor)  50 mg PO Q12 [facility]


   Last Admin: 02/20/16 11:33 Dose:  Not Given


Multivitamins/Vitamin C (Multi-Delyn Liquid)  5 ml PO DAILY [facility]


   Last Admin: 02/20/16 11:33 Dose:  Not Given


Nicotine (Nicoderm Cq)  1 patch TD DAILY [facility]


   Last Admin: 02/19/16 10:47 Dose:  1 patch


Quetiapine Fumarate (Seroquel)  25 mg PO DAILY@1700 [facility]


   Last Admin: 02/19/16 16:31 Dose:  25 mg











Results





- Vital Signs


Recent Vital Signs: 


 Last Vital Signs











Temp  97.9 F   02/20/16 08:15


 


Pulse  65   02/20/16 08:15


 


Resp  18   02/20/16 08:15


 


BP  98/58 L  02/20/16 08:15


 


Pulse Ox  100   02/20/16 08:15














- Labs


Result Diagrams: 


 02/10/16 16:05





 02/10/16 16:05


Labs: 


 Laboratory Results - last 24 hr











  02/19/16 02/20/16





  17:42 05:13


 


POC Glucose (mg/dL)  102  87

## 2016-02-20 NOTE — CP.PCM.PN
Subjective





- Date & Time of Evaluation


Date of Evaluation: 02/20/16


Time of Evaluation: 09:01





- Subjective


Subjective: 


pt extremely agitated at present, stating he is in long term and wants to go home. 

security and 1:1 at bedside and pt is getting restrained. psych dr erickson at 

bedside who will adjust pts meds.  no physical complaints offered.


ros-agitation





Objective





- Vital Signs/Intake and Output


Vital Signs (last 24 hours): 


 











Temp Pulse Resp BP Pulse Ox


 


 97.9 F   65   18   98/58 L  100 


 


 02/20/16 08:15  02/20/16 08:15  02/20/16 08:15  02/20/16 08:15  02/20/16 08:15











- Medications


Medications: 


 Current Medications





Aspirin (Ecotrin)  81 mg PO DAILY [facility]placed [facility]


   Last Admin: 02/19/16 10:45 Dose:  81 mg


Enalapril Maleate (Vasotec)  10 mg PO DAILY [facility]placed [facility]


   Last Admin: 02/19/16 10:50 Dose:  10 mg


Famotidine (Pepcid)  20 mg GT BID [facility]placed by [facility]


   Last Admin: 02/19/16 16:31 Dose:  20 mg


Haloperidol Lactate (Haldol)  2.5 mg IM Q6 PRN


   PRN Reason: Agitation


   Last Admin: 02/19/16 19:08 Dose:  2.5 mg


Levetiracetam (Keppra)  500 mg PO BID [facility]placed [facility]


   Last Admin: 02/19/16 16:30 Dose:  500 mg


Lorazepam (Ativan)  1 mg IVP Q4 PRN


   PRN Reason: Agitation


   Last Admin: 02/16/16 10:51 Dose:  1 mg


Metoprolol Tartrate (Lopressor)  50 mg PO Q12 [facility]placed [facility]


   Last Admin: 02/19/16 21:17 Dose:  50 mg


Multivitamins/Vitamin C (Multi-Delyn Liquid)  5 ml PO DAILY [facility]


   Last Admin: 02/19/16 10:47 Dose:  5 ml


Nicotine (Nicoderm Cq)  1 patch TD DAILY [facility]


   Last Admin: 02/19/16 10:47 Dose:  1 patch


Quetiapine Fumarate (Seroquel)  25 mg PO DAILY@1700 Replaced by [facility]


   Last Admin: 02/19/16 16:31 Dose:  25 mg











- Labs


Labs: 


 





 02/10/16 16:05 





 02/10/16 16:05 





 











PT  11.2 SECONDS (9.4-12.0)   12/14/15  05:20    


 


INR  1.05  (0.89-1.20)   12/14/15  05:20    


 


APTT  36.2 SECONDS (23.1-32.0)  H  12/14/15  05:20    














- Constitutional


Appears: Well, Non-toxic, No Acute Distress, Combative, Confused, Chronically 

Ill





- Head Exam


Head Exam: ATRAUMATIC, NORMAL INSPECTION, NORMOCEPHALIC





- Eye Exam


Eye Exam: EOMI, Normal appearance, PERRL


Pupil Exam: NORMAL ACCOMODATION, PERRL





- ENT Exam


ENT Exam: Mucous Membranes Moist, Normal Exam





- Neck Exam


Neck Exam: Full ROM, Normal Inspection.  absent: Lymphadenopathy





- Respiratory Exam


Respiratory Exam: Clear to Ausculation Bilateral, NORMAL BREATHING PATTERN





- Cardiovascular Exam


Cardiovascular Exam: REGULAR RHYTHM, +S1, +S2.  absent: Murmur





- GI/Abdominal Exam


GI & Abdominal Exam: Soft, Normal Bowel Sounds.  absent: Tenderness





- Extremities Exam


Extremities Exam: Full ROM, Normal Capillary Refill, Normal Inspection.  absent

: Joint Swelling, Pedal Edema





- Back Exam


Back Exam: NORMAL INSPECTION





- Neurological Exam


Neurological Exam: Alert, Altered, Awake, CN II-XII Intact, Normal Gait





- Psychiatric Exam


Psychiatric exam: Normal Affect, Normal Mood





- Skin


Skin Exam: Dry, Intact, Normal Color, Warm





Assessment and Plan


(1) DVT prophylaxis


Assessment & Plan: 


scd and ae hose


Status: Acute





(2) Scrotal edema


Status: Chronic





(3) Diabetes mellitus with hyperglycemia


Assessment & Plan: 


fsbg


Status: Chronic





(4) Agitation


Assessment & Plan: 


1:1


restraints


psych


seroqeul


ativan/haldol-extra dose given


depakote as per psych


Status: Acute





(5) CAD (coronary artery disease)


Assessment & Plan: 


cont meds


Status: Acute





(6) Smoking


Assessment & Plan: 


nicoderm


Status: Chronic





(7) Diverticulitis


Assessment & Plan: 


bland diet


gi


Status: Acute

## 2016-02-21 RX ADMIN — Medication SCH ML: at 09:23

## 2016-02-21 RX ADMIN — DIVALPROEX SODIUM SCH MG: 500 TABLET, DELAYED RELEASE ORAL at 09:22

## 2016-02-21 NOTE — CP.PCM.PN
Subjective





- Date & Time of Evaluation


Date of Evaluation: 02/21/16


Time of Evaluation: 12:50





- Subjective


Subjective: 


met pt, reviewed chart, talked to rn.





cc: none





hpi: no agitation since restraint yesterday. seems to be tolerating the 

depakote. paces the halls at time and still expresses that he wants to leave 

the hospital, but is more redirectable.





mse: labile mood, calm affect currently. no evidence of psychosis- does not 

endorse si/hi or a/v hallucinations. pt is with improved impulse control. poor i

/j.





assessment:


mood improving and tolerating depakote


check valproic acid level on 2/24 am





Objective





- Vital Signs/Intake and Output


Vital Signs (last 24 hours): 


 











Temp Pulse Resp BP Pulse Ox


 


 98.1 F   79   20   113/78   100 


 


 02/21/16 08:02  02/21/16 09:23  02/21/16 08:02  02/21/16 09:23  02/21/16 08:02











- Medications


Medications: 


 Current Medications





Aspirin (Ecotrin)  81 mg PO DAILY [facility]


   Last Admin: 02/21/16 09:23 Dose:  81 mg


Divalproex Sodium (Depakote Dr(*Bid*))  500 mg PO BID [facility]


   Last Admin: 02/21/16 09:22 Dose:  500 mg


Enalapril Maleate (Vasotec)  10 mg PO DAILY [facility]


   Last Admin: 02/20/16 11:35 Dose:  Not Given


Famotidine (Pepcid)  20 mg GT BID [facility]


   Last Admin: 02/21/16 09:24 Dose:  20 mg


Haloperidol Lactate (Haldol)  5 mg IM Q6 PRN


   PRN Reason: Agitation


Levetiracetam (Keppra)  500 mg PO BID [facility]


   Last Admin: 02/21/16 09:23 Dose:  500 mg


Lorazepam (Ativan)  1 mg IVP Q4 PRN


   PRN Reason: Agitation


   Last Admin: 02/20/16 09:55 Dose:  1 mg


Metoprolol Tartrate (Lopressor)  50 mg PO Q12 [facility]


   Last Admin: 02/21/16 09:23 Dose:  50 mg


Multivitamins/Vitamin C (Multi-Delyn Liquid)  5 ml PO DAILY [facility]


   Last Admin: 02/21/16 09:23 Dose:  5 ml


Nicotine (Nicoderm Cq)  1 patch TD DAILY [facility]


   Last Admin: 02/21/16 09:24 Dose:  1 patch


Quetiapine Fumarate (Seroquel)  25 mg PO DAILY@1700 [facility]


   Last Admin: 02/20/16 16:27 Dose:  25 mg











- Labs


Labs: 


 





 02/10/16 16:05 





 02/10/16 16:05 





 











PT  11.2 SECONDS (9.4-12.0)   12/14/15  05:20    


 


INR  1.05  (0.89-1.20)   12/14/15  05:20    


 


APTT  36.2 SECONDS (23.1-32.0)  H  12/14/15  05:20

## 2016-02-21 NOTE — CP.PCM.PN
Subjective





- Date & Time of Evaluation


Date of Evaluation: 02/21/16


Time of Evaluation: 07:46





- Subjective


Subjective: 


pt much more calm to day. no distress. still w/ 1:1 no f/c, n/v/d. no distress. 


ros negative except agitation





Objective





- Vital Signs/Intake and Output


Vital Signs (last 24 hours): 


 











Temp Pulse Resp BP Pulse Ox


 


 97.5 F L  82   18   139/74   99 


 


 02/20/16 16:00  02/20/16 21:46  02/20/16 08:15  02/20/16 21:46  02/20/16 16:00











- Medications


Medications: 


 Current Medications





Aspirin (Ecotrin)  81 mg PO DAILY [facility]


   Last Admin: 02/20/16 11:33 Dose:  Not Given


Divalproex Sodium (Depakote Dr(*Bid*))  500 mg PO BID [facility]


   Last Admin: 02/20/16 16:27 Dose:  500 mg


Enalapril Maleate (Vasotec)  10 mg PO DAILY [facility]


   Last Admin: 02/20/16 11:35 Dose:  Not Given


Famotidine (Pepcid)  20 mg GT BID [facility]


   Last Admin: 02/20/16 16:28 Dose:  20 mg


Haloperidol Lactate (Haldol)  5 mg IM Q6 PRN


   PRN Reason: Agitation


Levetiracetam (Keppra)  500 mg PO BID [facility]


   Last Admin: 02/20/16 16:27 Dose:  500 mg


Lorazepam (Ativan)  1 mg IVP Q4 PRN


   PRN Reason: Agitation


   Last Admin: 02/20/16 09:55 Dose:  1 mg


Metoprolol Tartrate (Lopressor)  50 mg PO Q12 [facility]


   Last Admin: 02/20/16 21:46 Dose:  Not Given


Multivitamins/Vitamin C (Multi-Delyn Liquid)  5 ml PO DAILY [facility]


   Last Admin: 02/20/16 12:24 Dose:  5 ml


Nicotine (Nicoderm Cq)  1 patch TD DAILY [facility]


   Last Admin: 02/20/16 12:13 Dose:  1 patch


Quetiapine Fumarate (Seroquel)  25 mg PO DAILY@1700 [facility]


   Last Admin: 02/20/16 16:27 Dose:  25 mg











- Labs


Labs: 


 





 02/10/16 16:05 





 02/10/16 16:05 





 











PT  11.2 SECONDS (9.4-12.0)   12/14/15  05:20    


 


INR  1.05  (0.89-1.20)   12/14/15  05:20    


 


APTT  36.2 SECONDS (23.1-32.0)  H  12/14/15  05:20    














- Constitutional


Appears: Well, Non-toxic, No Acute Distress





- Head Exam


Head Exam: ATRAUMATIC, NORMAL INSPECTION, NORMOCEPHALIC





- Eye Exam


Eye Exam: EOMI, Normal appearance, PERRL


Pupil Exam: NORMAL ACCOMODATION, PERRL





- ENT Exam


ENT Exam: Mucous Membranes Moist, Normal Exam





- Neck Exam


Neck Exam: Full ROM, Normal Inspection.  absent: Lymphadenopathy





- Respiratory Exam


Respiratory Exam: Clear to Ausculation Bilateral, NORMAL BREATHING PATTERN





- Cardiovascular Exam


Cardiovascular Exam: REGULAR RHYTHM, +S1, +S2.  absent: Murmur





- GI/Abdominal Exam


GI & Abdominal Exam: Soft, Normal Bowel Sounds.  absent: Tenderness





- Extremities Exam


Extremities Exam: Full ROM, Normal Capillary Refill, Normal Inspection.  absent

: Joint Swelling, Pedal Edema





- Back Exam


Back Exam: NORMAL INSPECTION





- Neurological Exam


Neurological Exam: Alert, Altered, Awake, CN II-XII Intact, Normal Gait





- Psychiatric Exam


Psychiatric exam: Normal Affect, Normal Mood





- Skin


Skin Exam: Dry, Intact, Normal Color, Warm





Assessment and Plan


(1) DVT prophylaxis


Assessment & Plan: 


scd and ae hose


ambulation


cont to hold lovenox


Status: Acute





(2) Scrotal edema


Status: Chronic





(3) Diabetes mellitus with hyperglycemia


Status: Chronic





(4) Agitation


Assessment & Plan: 


seroqeul, depakote


1:1


ativan/haldol prn


Status: Acute





(5) CAD (coronary artery disease)


Assessment & Plan: 


cont meds


Status: Acute





(6) Smoking


Assessment & Plan: 


nicoderm


Status: Chronic





(7) Diverticulitis


Status: Acute

## 2016-02-22 RX ADMIN — Medication SCH ML: at 09:30

## 2016-02-22 RX ADMIN — DIVALPROEX SODIUM SCH MG: 500 TABLET, DELAYED RELEASE ORAL at 17:05

## 2016-02-22 RX ADMIN — DIVALPROEX SODIUM SCH MG: 500 TABLET, DELAYED RELEASE ORAL at 09:31

## 2016-02-22 NOTE — CP.PCM.PN
Subjective





- Date & Time of Evaluation


Date of Evaluation: 02/22/16


Time of Evaluation: 08:13





- Subjective


Subjective: 


pt much calmer, happy and smiling. 1:1 at bedside for safety. no f/c, n/v/d. no 

complaitns


ros engative except agitation





Objective





- Vital Signs/Intake and Output


Vital Signs (last 24 hours): 


 











Temp Pulse Resp BP Pulse Ox


 


 97.9 F   67   20   142/96 H  98 


 


 02/22/16 08:04  02/22/16 08:04  02/22/16 08:04  02/22/16 08:04  02/22/16 08:04











- Medications


Medications: 


 Current Medications





Aspirin (Ecotrin)  81 mg PO DAILY [facility]


   Last Admin: 02/21/16 09:23 Dose:  81 mg


Divalproex Sodium (Depakote Dr(*Bid*))  500 mg PO BID [facility]


   Last Admin: 02/21/16 09:22 Dose:  500 mg


Enalapril Maleate (Vasotec)  10 mg PO DAILY [facility]


   Last Admin: 02/20/16 11:35 Dose:  Not Given


Famotidine (Pepcid)  20 mg GT BID [facility]


   Last Admin: 02/21/16 16:38 Dose:  20 mg


Haloperidol Lactate (Haldol)  5 mg IM Q6 PRN


   PRN Reason: Agitation


Levetiracetam (Keppra)  500 mg PO BID [facility]


   Last Admin: 02/21/16 16:38 Dose:  500 mg


Lorazepam (Ativan)  1 mg IVP Q4 PRN


   PRN Reason: Agitation


   Last Admin: 02/20/16 09:55 Dose:  1 mg


Metoprolol Tartrate (Lopressor)  50 mg PO Q12 [facility]


   Last Admin: 02/21/16 21:53 Dose:  50 mg


Multivitamins/Vitamin C (Multi-Delyn Liquid)  5 ml PO DAILY [facility]


   Last Admin: 02/21/16 09:23 Dose:  5 ml


Nicotine (Nicoderm Cq)  1 patch TD DAILY [facility]


   Last Admin: 02/21/16 09:24 Dose:  1 patch


Quetiapine Fumarate (Seroquel)  25 mg PO DAILY@1700 [facility]


   Last Admin: 02/21/16 16:38 Dose:  25 mg











- Labs


Labs: 


 





 02/10/16 16:05 





 02/10/16 16:05 





 











PT  11.2 SECONDS (9.4-12.0)   12/14/15  05:20    


 


INR  1.05  (0.89-1.20)   12/14/15  05:20    


 


APTT  36.2 SECONDS (23.1-32.0)  H  12/14/15  05:20    














- Constitutional


Appears: Well, Non-toxic, No Acute Distress





- Head Exam


Head Exam: ATRAUMATIC, NORMAL INSPECTION, NORMOCEPHALIC





- Eye Exam


Eye Exam: EOMI, Normal appearance, PERRL


Pupil Exam: NORMAL ACCOMODATION, PERRL





- ENT Exam


ENT Exam: Mucous Membranes Moist, Normal Exam





- Neck Exam


Neck Exam: Full ROM, Normal Inspection.  absent: Lymphadenopathy





- Respiratory Exam


Respiratory Exam: Clear to Ausculation Bilateral, NORMAL BREATHING PATTERN





- Cardiovascular Exam


Cardiovascular Exam: REGULAR RHYTHM, +S1, +S2.  absent: Murmur





- GI/Abdominal Exam


GI & Abdominal Exam: Soft, Normal Bowel Sounds.  absent: Tenderness





- Extremities Exam


Extremities Exam: Full ROM, Normal Capillary Refill, Normal Inspection.  absent

: Joint Swelling, Pedal Edema





- Back Exam


Back Exam: NORMAL INSPECTION





- Neurological Exam


Neurological Exam: Alert, Awake, CN II-XII Intact, Normal Gait, Oriented x3





- Psychiatric Exam


Psychiatric exam: Normal Affect, Normal Mood





- Skin


Skin Exam: Dry, Intact, Normal Color, Warm





Assessment and Plan


(1) DVT prophylaxis


Assessment & Plan: 


scd and ae hose, lovenox held


Status: Acute





(2) Scrotal edema


Status: Chronic





(3) Diabetes mellitus with hyperglycemia


Assessment & Plan: 


fsbg


Status: Chronic





(4) Agitation


Assessment & Plan: 


1:1


seroqeul,depakote


haldol/ativan prn


Status: Acute





(5) CAD (coronary artery disease)


Assessment & Plan: 


cotn meds


Status: Acute





(6) Smoking


Assessment & Plan: 


nicoderm


Status: Chronic





(7) Diverticulitis


Status: Resolved

## 2016-02-23 RX ADMIN — DIVALPROEX SODIUM SCH MG: 500 TABLET, DELAYED RELEASE ORAL at 16:51

## 2016-02-23 RX ADMIN — Medication SCH ML: at 09:30

## 2016-02-23 RX ADMIN — DIVALPROEX SODIUM SCH MG: 500 TABLET, DELAYED RELEASE ORAL at 09:27

## 2016-02-23 NOTE — CP.PCM.PN
Subjective





- Date & Time of Evaluation


Date of Evaluation: 02/23/16


Time of Evaluation: 07:27





- Subjective


Subjective: 


pt calm and cooperaive in bed. no distress, pain, complaints. no f/c, n/v/d. no 

bloody urine/bm


ros negative except intermittent agitation


1:1 remains for safety





Objective





- Vital Signs/Intake and Output


Vital Signs (last 24 hours): 


 











Temp Pulse Resp BP Pulse Ox


 


 98.4 F   75   20   147/88   99 


 


 02/23/16 01:34  02/23/16 01:34  02/23/16 01:34  02/23/16 01:34  02/23/16 01:34











- Medications


Medications: 


 Current Medications





Aspirin (Ecotrin)  81 mg PO DAILY [facility]


   Last Admin: 02/22/16 09:31 Dose:  81 mg


Divalproex Sodium (Depakote Dr(*Bid*))  500 mg PO BID [facility]


   Last Admin: 02/22/16 17:05 Dose:  500 mg


Enalapril Maleate (Vasotec)  10 mg PO DAILY [facility]


   Last Admin: 02/22/16 09:29 Dose:  10 mg


Famotidine (Pepcid)  20 mg GT BID [facility]


   Last Admin: 02/22/16 17:05 Dose:  20 mg


Haloperidol Lactate (Haldol)  5 mg IM Q6 PRN


   PRN Reason: Agitation


Levetiracetam (Keppra)  500 mg PO BID [facility]


   Last Admin: 02/22/16 17:05 Dose:  500 mg


Lorazepam (Ativan)  1 mg IVP Q4 PRN


   PRN Reason: Agitation


   Last Admin: 02/20/16 09:55 Dose:  1 mg


Metoprolol Tartrate (Lopressor)  50 mg PO Q12 [facility]


   Last Admin: 02/22/16 23:32 Dose:  50 mg


Multivitamins/Vitamin C (Multi-Delyn Liquid)  5 ml PO DAILY [facility]


   Last Admin: 02/22/16 09:30 Dose:  5 ml


Nicotine (Nicoderm Cq)  1 patch TD DAILY [facility]


   Last Admin: 02/22/16 09:31 Dose:  1 patch


Quetiapine Fumarate (Seroquel)  25 mg PO DAILY@1700 [facility]


   Last Admin: 02/22/16 17:05 Dose:  25 mg











- Labs


Labs: 


 





 02/10/16 16:05 





 02/10/16 16:05 





 











PT  11.2 SECONDS (9.4-12.0)   12/14/15  05:20    


 


INR  1.05  (0.89-1.20)   12/14/15  05:20    


 


APTT  36.2 SECONDS (23.1-32.0)  H  12/14/15  05:20    














- Constitutional


Appears: Well, Non-toxic, No Acute Distress





- Head Exam


Head Exam: ATRAUMATIC, NORMAL INSPECTION, NORMOCEPHALIC





- Eye Exam


Eye Exam: EOMI, Normal appearance, PERRL


Pupil Exam: NORMAL ACCOMODATION, PERRL





- ENT Exam


ENT Exam: Mucous Membranes Moist, Normal Exam





- Neck Exam


Neck Exam: Full ROM, Normal Inspection.  absent: Lymphadenopathy





- Respiratory Exam


Respiratory Exam: Clear to Ausculation Bilateral, NORMAL BREATHING PATTERN





- Cardiovascular Exam


Cardiovascular Exam: REGULAR RHYTHM, +S1, +S2.  absent: Murmur





- GI/Abdominal Exam


GI & Abdominal Exam: Soft, Normal Bowel Sounds.  absent: Tenderness





- Extremities Exam


Extremities Exam: Full ROM, Normal Capillary Refill, Normal Inspection.  absent

: Joint Swelling, Pedal Edema





- Back Exam


Back Exam: NORMAL INSPECTION





- Neurological Exam


Neurological Exam: Alert, Awake, CN II-XII Intact, Normal Gait





- Psychiatric Exam


Psychiatric exam: Normal Affect, Normal Mood





- Skin


Skin Exam: Dry, Intact, Normal Color, Warm





Assessment and Plan


(1) DVT prophylaxis


Assessment & Plan: 


scd and aehose, hold lovenox


Status: Acute





(2) Scrotal edema


Status: Chronic





(3) Diabetes mellitus with hyperglycemia


Assessment & Plan: 


all appears to be stable


?? acute phase reaactant, will monitor


Status: Chronic





(4) Agitation


Assessment & Plan: 


1:1


depakote


serqoeul


ativan/haldol prn


Status: Acute





(5) CAD (coronary artery disease)


Assessment & Plan: 


cont meds


Status: Acute





(6) Smoking


Assessment & Plan: 


nicoderm


Status: Chronic

## 2016-02-24 RX ADMIN — DIVALPROEX SODIUM SCH MG: 500 TABLET, DELAYED RELEASE ORAL at 17:07

## 2016-02-24 RX ADMIN — DIVALPROEX SODIUM SCH MG: 500 TABLET, DELAYED RELEASE ORAL at 09:34

## 2016-02-24 RX ADMIN — Medication SCH ML: at 09:36

## 2016-02-24 NOTE — PN
DATE: 02/24/2016



The patient is sitting up in chair, no distress, no pain, no complaints.  A 1:1 at bedside for safety
.  No fever, chills, nausea, vomiting, diarrhea.  The patient has been much calmer since medications 
were adjusted by psychiatry.  No blood in stool or urine has been noted or reported by the staff.



REVIEW OF SYSTEMS:  Negative except for intermittent agitation necessitating 1:1.



ASSESSMENT:

GENERAL:  Alert and oriented, at his current baseline, is able to state year and place.

HEART:  Regular rate and rhythm.  No murmurs, rubs or gallops.

LUNGS:  Clear to auscultation in all fields.

ABDOMEN:  Soft, nontender.  Bowel sounds x 4.

EXTREMITIES:  Distal pulses, motor sensation intact.  Negative Homans' sign.  Cap refill is brisk.



DIAGNOSES:  For the patient, coronary artery disease, status post cardiac arrest, smoking, agitation,
 deep venous thrombosis prophylaxis.



PLAN:  We will continue all current medications, therapies.  We will continue to hold Lovenox for pre
vious hematuria.  We will continue Nicoderm patch for smoking cessation.  We will continue 1:1, Seroq
uel, Depakote, Ativan and Haldol as needed for safety of the patient related to his agitation.  We wi
ll continue to follow with  for guardianship and placement as patient has still been d
eclared incompetent by psychiatry.





__________________________________________

Fan SILVER







cc:



DD: 02/24/2016 08:39:19  [id number]

TT: 02/24/2016 08:50:17

Confirmation # [id number]

Dictation # [id number]

en

## 2016-02-24 NOTE — CP.PCM.PN
Subjective





- Date & Time of Evaluation


Date of Evaluation: 02/24/16


Time of Evaluation: 07:17





- Subjective


Subjective: 


doign well, sitting up in bed, no distress


calm and cooperative


1:1 remains for safety


dictation # [id number]





Objective





- Vital Signs/Intake and Output


Vital Signs (last 24 hours): 


 











Temp Pulse Resp BP Pulse Ox


 


 98.2 F   66   20   112/71   96 


 


 02/24/16 00:57  02/24/16 00:57  02/24/16 00:57  02/24/16 00:57  02/24/16 00:57











- Medications


Medications: 


 Current Medications





Aspirin (Ecotrin)  81 mg PO DAILY [facility]


   Last Admin: 02/23/16 09:29 Dose:  81 mg


Divalproex Sodium (Depakote Dr(*Bid*))  500 mg PO BID [facility]


   Last Admin: 02/23/16 16:51 Dose:  500 mg


Enalapril Maleate (Vasotec)  10 mg PO DAILY [facility]


   Last Admin: 02/23/16 09:38 Dose:  10 mg


Famotidine (Pepcid)  20 mg GT BID [facility]


   Last Admin: 02/23/16 09:31 Dose:  20 mg


Haloperidol Lactate (Haldol)  5 mg IM Q6 PRN


   PRN Reason: Agitation


Levetiracetam (Keppra)  500 mg PO BID [facility]


   Last Admin: 02/23/16 16:51 Dose:  500 mg


Lorazepam (Ativan)  1 mg IVP Q4 PRN


   PRN Reason: Agitation


   Last Admin: 02/23/16 19:08 Dose:  1 mg


Metoprolol Tartrate (Lopressor)  50 mg PO Q12 [facility]


   Last Admin: 02/23/16 21:44 Dose:  Not Given


Multivitamins/Vitamin C (Multi-Delyn Liquid)  5 ml PO DAILY [facility]


   Last Admin: 02/23/16 09:30 Dose:  5 ml


Nicotine (Nicoderm Cq)  1 patch TD DAILY [facility]


   Last Admin: 02/23/16 09:30 Dose:  1 patch


Quetiapine Fumarate (Seroquel)  25 mg PO DAILY@1700 [facility]


   Last Admin: 02/23/16 16:52 Dose:  25 mg











- Labs


Labs: 


 





 02/10/16 16:05 





 02/10/16 16:05 





 











PT  11.2 SECONDS (9.4-12.0)   12/14/15  05:20    


 


INR  1.05  (0.89-1.20)   12/14/15  05:20    


 


APTT  36.2 SECONDS (23.1-32.0)  H  12/14/15  05:20    














Assessment and Plan


(1) DVT prophylaxis


Status: Acute





(2) Scrotal edema


Status: Chronic





(3) Diabetes mellitus with hyperglycemia


Status: Chronic





(4) Agitation


Status: Acute





(5) CAD (coronary artery disease)


Status: Acute





(6) Smoking


Status: Chronic

## 2016-02-25 RX ADMIN — DIVALPROEX SODIUM SCH MG: 500 TABLET, DELAYED RELEASE ORAL at 09:17

## 2016-02-25 RX ADMIN — DIVALPROEX SODIUM SCH MG: 500 TABLET, DELAYED RELEASE ORAL at 17:51

## 2016-02-25 RX ADMIN — Medication SCH ML: at 09:17

## 2016-02-25 NOTE — CP.PCM.PN
Subjective





- Date & Time of Evaluation


Date of Evaluation: 02/25/16


Time of Evaluation: 08:14





- Subjective


Subjective: 


pt in bed, comfortable, cooperative. no distress. no f/c, n/v/d. 1:1 remainsat 

bedside for agitation


ros negative except agitation





Objective





- Vital Signs/Intake and Output


Vital Signs (last 24 hours): 


 











Temp Pulse Resp BP Pulse Ox


 


 97.2 F L  89   20   118/76   100 


 


 02/24/16 16:00  02/24/16 16:00  02/24/16 08:28  02/24/16 16:00  02/24/16 16:00











- Medications


Medications: 


 Current Medications





Aspirin (Ecotrin)  81 mg PO DAILY [facility]


   Last Admin: 02/24/16 09:35 Dose:  81 mg


Divalproex Sodium (Depakote Dr(*Bid*))  500 mg PO BID [facility]


   Last Admin: 02/24/16 17:07 Dose:  500 mg


Enalapril Maleate (Vasotec)  10 mg PO DAILY [facility]


   Last Admin: 02/24/16 09:37 Dose:  10 mg


Famotidine (Pepcid)  20 mg GT BID [facility]


   Last Admin: 02/24/16 17:10 Dose:  20 mg


Haloperidol Lactate (Haldol)  5 mg IM Q6 PRN


   PRN Reason: Agitation


Levetiracetam (Keppra)  500 mg PO BID [facility]


   Last Admin: 02/24/16 17:06 Dose:  500 mg


Lorazepam (Ativan)  1 mg IVP Q4 PRN


   PRN Reason: Agitation


   Last Admin: 02/24/16 07:23 Dose:  1 mg


Metoprolol Tartrate (Lopressor)  50 mg PO Q12 [facility]


   Last Admin: 02/24/16 09:35 Dose:  50 mg


Multivitamins/Vitamin C (Multi-Delyn Liquid)  5 ml PO DAILY [facility]


   Last Admin: 02/24/16 09:36 Dose:  5 ml


Nicotine (Nicoderm Cq)  1 patch TD DAILY [facility]


   Last Admin: 02/24/16 09:36 Dose:  1 patch


Quetiapine Fumarate (Seroquel)  25 mg PO DAILY@1700 [facility]


   Last Admin: 02/24/16 17:07 Dose:  25 mg











- Labs


Labs: 


 





 02/10/16 16:05 





 02/10/16 16:05 





 











PT  11.2 SECONDS (9.4-12.0)   12/14/15  05:20    


 


INR  1.05  (0.89-1.20)   12/14/15  05:20    


 


APTT  36.2 SECONDS (23.1-32.0)  H  12/14/15  05:20    














- Constitutional


Appears: Well, Non-toxic, No Acute Distress





- Head Exam


Head Exam: ATRAUMATIC, NORMAL INSPECTION, NORMOCEPHALIC





- Eye Exam


Eye Exam: EOMI, Normal appearance, PERRL


Pupil Exam: NORMAL ACCOMODATION, PERRL





- ENT Exam


ENT Exam: Mucous Membranes Moist, Normal Exam





- Neck Exam


Neck Exam: Full ROM, Normal Inspection.  absent: Lymphadenopathy





- Respiratory Exam


Respiratory Exam: Clear to Ausculation Bilateral, NORMAL BREATHING PATTERN





- Cardiovascular Exam


Cardiovascular Exam: REGULAR RHYTHM, +S1, +S2.  absent: Murmur





- GI/Abdominal Exam


GI & Abdominal Exam: Soft, Normal Bowel Sounds.  absent: Tenderness





-  Exam


Bimanual exam: NORMAL BIMANUAL EXAM





- Extremities Exam


Extremities Exam: Full ROM, Normal Capillary Refill, Normal Inspection.  absent

: Joint Swelling, Pedal Edema





- Back Exam


Back Exam: NORMAL INSPECTION





- Neurological Exam


Neurological Exam: Alert, Awake, CN II-XII Intact, Normal Gait, Oriented x3





- Psychiatric Exam


Psychiatric exam: Normal Affect, Normal Mood





- Skin


Skin Exam: Dry, Intact, Normal Color, Warm





Assessment and Plan


(1) DVT prophylaxis


Assessment & Plan: 


scd adn ae hose, ambulation


Status: Acute





(2) Scrotal edema


Status: Chronic





(3) Diabetes mellitus with hyperglycemia


Status: Ruled-out





(4) Agitation


Assessment & Plan: 


1:1


serioquel/depakote


ativan/haldol prn


Status: Acute





(5) CAD (coronary artery disease)


Assessment & Plan: 


cotn meds


Status: Acute





(6) Smoking


Assessment & Plan: 


nicoderm


Status: Chronic

## 2016-02-26 RX ADMIN — Medication SCH ML: at 09:08

## 2016-02-26 RX ADMIN — DIVALPROEX SODIUM SCH MG: 500 TABLET, DELAYED RELEASE ORAL at 16:47

## 2016-02-26 RX ADMIN — DIVALPROEX SODIUM SCH MG: 500 TABLET, DELAYED RELEASE ORAL at 09:08

## 2016-02-26 NOTE — CP.PCM.PN
Subjective





- Date & Time of Evaluation


Date of Evaluation: 02/26/16


Time of Evaluation: 11:14





- Subjective


Subjective: 


pt in chair, comfortable, cooperative. no distress. no f/c, n/v/d. 1:1 

remainsat bedside for agitation


ros negative except agitation





Objective





- Vital Signs/Intake and Output


Vital Signs (last 24 hours): 


 











Temp Pulse Resp BP Pulse Ox


 


 97.5 F L  65   20   129/93 H  100 


 


 02/26/16 08:20  02/26/16 09:09  02/26/16 08:20  02/26/16 09:09  02/26/16 08:20











- Medications


Medications: 


 Current Medications





Aspirin (Ecotrin)  81 mg PO DAILY [facility]


   Last Admin: 02/26/16 09:09 Dose:  81 mg


Divalproex Sodium (Depakote Dr(*Bid*))  500 mg PO BID [facility]


   Last Admin: 02/26/16 09:08 Dose:  500 mg


Enalapril Maleate (Vasotec)  10 mg PO DAILY [facility]


   Last Admin: 02/26/16 09:08 Dose:  10 mg


Famotidine (Pepcid)  20 mg GT BID [facility]


   Last Admin: 02/26/16 09:08 Dose:  20 mg


Haloperidol Lactate (Haldol)  5 mg IM Q6 PRN


   PRN Reason: Agitation


Levetiracetam (Keppra)  500 mg PO BID [facility]


   Last Admin: 02/26/16 09:08 Dose:  500 mg


Lorazepam (Ativan)  1 mg IVP Q4 PRN


   PRN Reason: Agitation


   Last Admin: 02/24/16 07:23 Dose:  1 mg


Metoprolol Tartrate (Lopressor)  50 mg PO Q12 [facility]


   Last Admin: 02/26/16 09:09 Dose:  50 mg


Multivitamins/Vitamin C (Multi-Delyn Liquid)  5 ml PO DAILY [facility]


   Last Admin: 02/26/16 09:08 Dose:  5 ml


Nicotine (Nicoderm Cq)  1 patch TD DAILY [facility]


   Last Admin: 02/26/16 09:09 Dose:  1 patch


Quetiapine Fumarate (Seroquel)  25 mg PO DAILY@1700 [facility]


   Last Admin: 02/25/16 17:51 Dose:  25 mg











- Labs


Labs: 


 





 02/10/16 16:05 





 02/10/16 16:05 





 











PT  11.2 SECONDS (9.4-12.0)   12/14/15  05:20    


 


INR  1.05  (0.89-1.20)   12/14/15  05:20    


 


APTT  36.2 SECONDS (23.1-32.0)  H  12/14/15  05:20    














- Constitutional


Appears: Well, Non-toxic, No Acute Distress, Chronically Ill





- Head Exam


Head Exam: ATRAUMATIC, NORMAL INSPECTION, NORMOCEPHALIC





- Eye Exam


Eye Exam: EOMI, Normal appearance, PERRL


Pupil Exam: NORMAL ACCOMODATION, PERRL





- ENT Exam


ENT Exam: Mucous Membranes Moist, Normal Exam





- Neck Exam


Neck Exam: Full ROM, Normal Inspection.  absent: Lymphadenopathy





- Respiratory Exam


Respiratory Exam: Clear to Ausculation Bilateral, NORMAL BREATHING PATTERN





- Cardiovascular Exam


Cardiovascular Exam: REGULAR RHYTHM, +S1, +S2.  absent: Murmur





- GI/Abdominal Exam


GI & Abdominal Exam: Soft, Normal Bowel Sounds.  absent: Tenderness





- Extremities Exam


Extremities Exam: Full ROM, Normal Capillary Refill, Normal Inspection.  absent

: Joint Swelling, Pedal Edema





- Back Exam


Back Exam: NORMAL INSPECTION





- Neurological Exam


Neurological Exam: Alert, Awake, CN II-XII Intact, Normal Gait, Oriented x3





- Psychiatric Exam


Psychiatric exam: Normal Affect, Normal Mood





- Skin


Skin Exam: Dry, Intact, Normal Color, Warm





Assessment and Plan


(1) DVT prophylaxis


Assessment & Plan: 


scd and aehose


ambulation


Status: Acute





(2) Scrotal edema


Status: Chronic





(3) Agitation


Assessment & Plan: 


seroqeul/depakote


1:1


ativan/haldol prn


Status: Acute





(4) CAD (coronary artery disease)


Assessment & Plan: 


cont meds


Status: Acute





(5) Smoking


Assessment & Plan: 


nicoderm


Status: Chronic

## 2016-02-27 RX ADMIN — DIVALPROEX SODIUM SCH MG: 500 TABLET, DELAYED RELEASE ORAL at 09:37

## 2016-02-27 RX ADMIN — Medication SCH ML: at 09:37

## 2016-02-27 RX ADMIN — DIVALPROEX SODIUM SCH MG: 500 TABLET, DELAYED RELEASE ORAL at 17:28

## 2016-02-27 NOTE — CP.PCM.PN
Subjective





- Date & Time of Evaluation


Date of Evaluation: 02/27/16


Time of Evaluation: 07:18





- Subjective


Subjective: 


pt sitting in bed, no distress, complaints. no f/c, n/v/d. pt calm at present 

but still w/ periods of agitation.  1:1 remains


ros negative except agitation





Objective





- Vital Signs/Intake and Output


Vital Signs (last 24 hours): 


 











Temp Pulse Resp BP Pulse Ox


 


 98.8 F   82   20   141/87   99 


 


 02/26/16 16:00  02/26/16 21:02  02/26/16 08:20  02/26/16 21:02  02/26/16 16:00











- Medications


Medications: 


 Current Medications





Aspirin (Ecotrin)  81 mg PO DAILY [facility]


   Last Admin: 02/26/16 09:09 Dose:  81 mg


Divalproex Sodium (Depakote Dr(*Bid*))  500 mg PO BID [facility]


   Last Admin: 02/26/16 16:47 Dose:  500 mg


Enalapril Maleate (Vasotec)  10 mg PO DAILY [facility]


   Last Admin: 02/26/16 09:08 Dose:  10 mg


Famotidine (Pepcid)  20 mg GT BID [facility]


   Last Admin: 02/26/16 16:47 Dose:  20 mg


Haloperidol Lactate (Haldol)  5 mg IM Q6 PRN


   PRN Reason: Agitation


Levetiracetam (Keppra)  500 mg PO BID [facility]


   Last Admin: 02/26/16 16:47 Dose:  500 mg


Lorazepam (Ativan)  1 mg IVP Q4 PRN


   PRN Reason: Agitation


   Last Admin: 02/24/16 07:23 Dose:  1 mg


Metoprolol Tartrate (Lopressor)  50 mg PO Q12 [facility]


   Last Admin: 02/26/16 21:02 Dose:  50 mg


Multivitamins/Vitamin C (Multi-Delyn Liquid)  5 ml PO DAILY [facility]


   Last Admin: 02/26/16 09:08 Dose:  5 ml


Nicotine (Nicoderm Cq)  1 patch TD DAILY [facility]


   Last Admin: 02/26/16 09:09 Dose:  1 patch


Quetiapine Fumarate (Seroquel)  25 mg PO DAILY@1700 [facility]


   Last Admin: 02/26/16 16:47 Dose:  25 mg











- Labs


Labs: 


 





 02/10/16 16:05 





 02/10/16 16:05 





 











PT  11.2 SECONDS (9.4-12.0)   12/14/15  05:20    


 


INR  1.05  (0.89-1.20)   12/14/15  05:20    


 


APTT  36.2 SECONDS (23.1-32.0)  H  12/14/15  05:20    














- Constitutional


Appears: Well, Non-toxic, No Acute Distress





- Head Exam


Head Exam: ATRAUMATIC, NORMAL INSPECTION, NORMOCEPHALIC





- Eye Exam


Eye Exam: EOMI, Normal appearance, PERRL


Pupil Exam: NORMAL ACCOMODATION, PERRL





- ENT Exam


ENT Exam: Mucous Membranes Moist, Normal Exam





- Neck Exam


Neck Exam: Full ROM, Normal Inspection.  absent: Lymphadenopathy





- Respiratory Exam


Respiratory Exam: Clear to Ausculation Bilateral, NORMAL BREATHING PATTERN





- Cardiovascular Exam


Cardiovascular Exam: REGULAR RHYTHM, +S1, +S2.  absent: Murmur





- GI/Abdominal Exam


GI & Abdominal Exam: Soft, Normal Bowel Sounds.  absent: Tenderness





- Extremities Exam


Extremities Exam: Full ROM, Normal Capillary Refill, Normal Inspection.  absent

: Joint Swelling, Pedal Edema





- Back Exam


Back Exam: NORMAL INSPECTION





- Neurological Exam


Neurological Exam: Alert, Awake, CN II-XII Intact, Normal Gait





- Psychiatric Exam


Psychiatric exam: Normal Affect, Normal Mood





- Skin


Skin Exam: Dry, Intact, Normal Color, Warm





Assessment and Plan


(1) DVT prophylaxis


Assessment & Plan: 


scd and ae hose


ambualtion


hold lovenox


Status: Acute





(2) Scrotal edema


Status: Chronic





(3) Agitation


Assessment & Plan: 


seroquel/depakote


ativanhaldol prn


1:1


Status: Acute





(4) CAD (coronary artery disease)


Assessment & Plan: 


cotn meds


Status: Acute





(5) Smoking


Assessment & Plan: 


nicoderm


Status: Chronic

## 2016-02-28 RX ADMIN — DIVALPROEX SODIUM SCH MG: 500 TABLET, DELAYED RELEASE ORAL at 15:59

## 2016-02-28 RX ADMIN — Medication SCH ML: at 09:10

## 2016-02-28 RX ADMIN — DIVALPROEX SODIUM SCH MG: 500 TABLET, DELAYED RELEASE ORAL at 09:09

## 2016-02-28 NOTE — CP.PCM.PN
Subjective





- Date & Time of Evaluation


Date of Evaluation: 02/28/16


Time of Evaluation: 07:41





- Subjective


Subjective: 


comfortable in bed, no disttress, no f/c, n/v/d, no complaints


ros negative except agitation


1:1 remains





Objective





- Vital Signs/Intake and Output


Vital Signs (last 24 hours): 


 











Temp Pulse Resp BP Pulse Ox


 


 98.2 F   68   20   145/83   98 


 


 02/28/16 00:45  02/28/16 00:45  02/28/16 00:45  02/28/16 00:45  02/28/16 00:45











- Medications


Medications: 


 Current Medications





Divalproex Sodium (Depakote Dr(*Bid*))  500 mg PO BID [facility]


   Last Admin: 02/27/16 17:28 Dose:  500 mg


Enalapril Maleate (Vasotec)  10 mg PO DAILY [facility]


   Last Admin: 02/27/16 09:39 Dose:  10 mg


Haloperidol Lactate (Haldol)  5 mg IM Q6 PRN


   PRN Reason: Agitation


Lorazepam (Ativan)  1 mg IVP Q4 PRN


   PRN Reason: Agitation


   Last Admin: 02/24/16 07:23 Dose:  1 mg


Metoprolol Tartrate (Lopressor)  50 mg PO Q12 [facility]


   Last Admin: 02/27/16 22:21 Dose:  50 mg


Multivitamins/Vitamin C (Multi-Delyn Liquid)  5 ml PO DAILY [facility]


   Last Admin: 02/27/16 09:37 Dose:  5 ml


Quetiapine Fumarate (Seroquel)  25 mg PO DAILY@1700 [facility]


   Last Admin: 02/27/16 17:28 Dose:  25 mg











- Labs


Labs: 


 





 02/10/16 16:05 





 02/10/16 16:05 





 











PT  11.2 SECONDS (9.4-12.0)   12/14/15  05:20    


 


INR  1.05  (0.89-1.20)   12/14/15  05:20    


 


APTT  36.2 SECONDS (23.1-32.0)  H  12/14/15  05:20    














- Constitutional


Appears: Well, Non-toxic, No Acute Distress





- Head Exam


Head Exam: ATRAUMATIC, NORMAL INSPECTION, NORMOCEPHALIC





- Eye Exam


Eye Exam: EOMI, Normal appearance, PERRL


Pupil Exam: NORMAL ACCOMODATION, PERRL





- ENT Exam


ENT Exam: Mucous Membranes Moist, Normal Exam





- Neck Exam


Neck Exam: Full ROM, Normal Inspection.  absent: Lymphadenopathy





- Respiratory Exam


Respiratory Exam: Clear to Ausculation Bilateral, NORMAL BREATHING PATTERN





- Cardiovascular Exam


Cardiovascular Exam: REGULAR RHYTHM, +S1, +S2.  absent: Murmur





- GI/Abdominal Exam


GI & Abdominal Exam: Soft, Normal Bowel Sounds.  absent: Tenderness





- Extremities Exam


Extremities Exam: Full ROM, Normal Capillary Refill, Normal Inspection.  absent

: Joint Swelling, Pedal Edema





- Back Exam


Back Exam: NORMAL INSPECTION





- Neurological Exam


Neurological Exam: Alert, Awake, CN II-XII Intact, Normal Gait





- Psychiatric Exam


Psychiatric exam: Normal Affect, Normal Mood





- Skin


Skin Exam: Dry, Intact, Normal Color, Warm





Assessment and Plan


(1) DVT prophylaxis


Assessment & Plan: 


scd and ae hsoe, ambulation


Status: Acute





(2) Scrotal edema


Status: Chronic





(3) Agitation


Assessment & Plan: 


1:1


depakot/seroquel


ativan/haldol prn


Status: Acute





(4) CAD (coronary artery disease)


Assessment & Plan: 


cont meds


Status: Acute





(5) Smoking


Assessment & Plan: 


nicoderm


Status: Chronic

## 2016-02-29 RX ADMIN — Medication SCH ML: at 09:24

## 2016-02-29 RX ADMIN — DIVALPROEX SODIUM SCH MG: 500 TABLET, DELAYED RELEASE ORAL at 17:26

## 2016-02-29 RX ADMIN — DIVALPROEX SODIUM SCH MG: 500 TABLET, DELAYED RELEASE ORAL at 09:22

## 2016-02-29 NOTE — CP.PCM.PN
Subjective





- Date & Time of Evaluation


Date of Evaluation: 02/29/16


Time of Evaluation: 08:42





- Subjective


Subjective: 


pt cofmortable in chair w/ 1:1 for intermittent agitation.  pt able to hold 

conversation at this time


no f/c, n/v/d


ros negative except intermittent agitation





Objective





- Vital Signs/Intake and Output


Vital Signs (last 24 hours): 


 











Temp Pulse Resp BP Pulse Ox


 


 98.1 F   76   20   114/61   100 


 


 02/28/16 16:51  02/28/16 21:11  02/28/16 16:51  02/28/16 21:11  02/28/16 16:51











- Medications


Medications: 


 Current Medications





Aspirin (Ecotrin)  81 mg PO DAILY [facility]


Divalproex Sodium (Depakote Dr(*Bid*))  500 mg PO BID [facility]


   Last Admin: 02/28/16 15:59 Dose:  500 mg


Enalapril Maleate (Vasotec)  10 mg PO DAILY [facility]


   Last Admin: 02/28/16 09:10 Dose:  10 mg


Famotidine (Pepcid)  20 mg PO BID [facility]


   Last Admin: 02/28/16 16:38 Dose:  20 mg


Haloperidol Lactate (Haldol)  5 mg IM Q6 PRN


   PRN Reason: Agitation


Levetiracetam (Keppra)  500 mg PO BID [facility]


   Last Admin: 02/28/16 16:39 Dose:  500 mg


Lorazepam (Ativan)  1 mg IVP Q4 PRN


   PRN Reason: Agitation


   Last Admin: 02/24/16 07:23 Dose:  1 mg


Metoprolol Tartrate (Lopressor)  50 mg PO Q12 [facility]


   Last Admin: 02/28/16 21:11 Dose:  50 mg


Multivitamins/Vitamin C (Multi-Delyn Liquid)  5 ml PO DAILY [facility]


   Last Admin: 02/28/16 09:10 Dose:  5 ml


Nicotine (Nicoderm Cq)  1 patch TD DAILY [facility]


Quetiapine Fumarate (Seroquel)  25 mg PO DAILY@1700 [facility]


   Last Admin: 02/28/16 15:59 Dose:  25 mg











- Labs


Labs: 


 





 02/10/16 16:05 





 02/10/16 16:05 





 











PT  11.2 SECONDS (9.4-12.0)   12/14/15  05:20    


 


INR  1.05  (0.89-1.20)   12/14/15  05:20    


 


APTT  36.2 SECONDS (23.1-32.0)  H  12/14/15  05:20    














- Constitutional


Appears: Well, Non-toxic, No Acute Distress





- Head Exam


Head Exam: ATRAUMATIC, NORMAL INSPECTION, NORMOCEPHALIC





- Eye Exam


Eye Exam: EOMI, Normal appearance, PERRL


Pupil Exam: NORMAL ACCOMODATION, PERRL





- ENT Exam


ENT Exam: Mucous Membranes Moist, Normal Exam





- Neck Exam


Neck Exam: Full ROM, Normal Inspection.  absent: Lymphadenopathy





- Respiratory Exam


Respiratory Exam: Clear to Ausculation Bilateral, NORMAL BREATHING PATTERN





- Cardiovascular Exam


Cardiovascular Exam: REGULAR RHYTHM, +S1, +S2.  absent: Murmur





- GI/Abdominal Exam


GI & Abdominal Exam: Soft, Normal Bowel Sounds.  absent: Tenderness





- Extremities Exam


Extremities Exam: Full ROM, Normal Capillary Refill, Normal Inspection.  absent

: Joint Swelling, Pedal Edema





- Back Exam


Back Exam: NORMAL INSPECTION





- Neurological Exam


Neurological Exam: Alert, Awake, CN II-XII Intact, Normal Gait, Oriented x3





- Psychiatric Exam


Psychiatric exam: Normal Affect, Normal Mood





- Skin


Skin Exam: Dry, Intact, Normal Color, Warm





Assessment and Plan


(1) DVT prophylaxis


Assessment & Plan: 


scd adn ae hose


ambualtion


Status: Acute





(2) Scrotal edema


Status: Chronic





(3) Agitation


Assessment & Plan: 


1:1


seroquel/depakote


ativan/haldol prn


Status: Acute





(4) CAD (coronary artery disease)


Assessment & Plan: 


cont meds


Status: Acute





(5) Smoking


Assessment & Plan: 


nicoderm


Status: Chronic

## 2016-03-01 RX ADMIN — DIVALPROEX SODIUM SCH MG: 500 TABLET, DELAYED RELEASE ORAL at 08:49

## 2016-03-01 RX ADMIN — DIVALPROEX SODIUM SCH MG: 500 TABLET, DELAYED RELEASE ORAL at 17:24

## 2016-03-01 RX ADMIN — Medication SCH ML: at 08:52

## 2016-03-01 NOTE — PN
DATE: 03/01/2016



The patient is evaluated in bed.  Calm, cooperative, comfortable.  The patient denies any complaints 
at present time.  The patient was reevaluated by psychiatry and still is deemed unable to make decisi
ons for himself.



REVIEW OF SYSTEMS:  Normal except intermittent agitation that seems to be controlled with the new med
icine regimen.



PHYSICAL EXAMINATION:

GENERAL:  The patient alert and oriented, at patient's baseline.  

LUNGS:  Clear in all fields.  Respirations even and unlabored.

HEART:  Regular rate and rhythm.  No murmurs, rubs or gallops.

ABDOMEN:  Soft, nontender.  Bowel sounds in all quadrants.

EXTREMITIES:  Distal pulses, motor and sensation intact.  Cap refill is brisk.  Negative Homans sign.




DIAGNOSES:  Coronary artery disease, status post cardiac arrest; agitation, deep venous thrombosis pr
ophylaxis, smoking.



PLAN:  Will continue all medicines for CAD.  Will continue Seroquel and Depakote for agitation and 1:
1 as indicated.  Ativan and Haldol as needed for acute agitation and aggressiveness.  Will continue S
CD and AE hose.  Continue to hold Lovenox related to hematuria that has since resolved.  Will encoura
ge ambulation to aid with DVT prophylaxis.  Will continue Nicoderm patch for smoking cessation and wi
ll continue Keppra as patient has a history of seizure disorder prior to admission.  We will continue
 to follow with  pending guardianship and disposition to an appropriate facility.





__________________________________________

Fan SILVER







cc:



DD: 03/01/2016 07:58:12  [id number]

TT: 03/01/2016 08:22:40

Confirmation # [id number]

Dictation # [id number]

mn

## 2016-03-02 RX ADMIN — DIVALPROEX SODIUM SCH MG: 500 TABLET, DELAYED RELEASE ORAL at 16:06

## 2016-03-02 RX ADMIN — DIVALPROEX SODIUM SCH MG: 500 TABLET, DELAYED RELEASE ORAL at 09:08

## 2016-03-02 RX ADMIN — Medication SCH ML: at 09:10

## 2016-03-02 NOTE — CP.PCM.PN
Subjective





- Date & Time of Evaluation


Date of Evaluation: 03/02/16


Time of Evaluation: 07:09





- Subjective


Subjective: 


pt cofmortable in hed.  pt able to hold conversation at this time


no f/c, n/v/d


successfully off 1:1 but remains unable to make decisions for self   


ros negative 





Objective





- Vital Signs/Intake and Output


Vital Signs (last 24 hours): 


 











Temp Pulse Resp BP Pulse Ox


 


 97.8 F   76   18   116/68   100 


 


 03/02/16 00:17  03/02/16 00:17  03/02/16 00:17  03/02/16 00:17  03/02/16 00:17











- Medications


Medications: 


 Current Medications





Aspirin (Ecotrin)  81 mg PO DAILY [facility]


   Last Admin: 03/01/16 08:49 Dose:  81 mg


Divalproex Sodium (Depakote Dr(*Bid*))  500 mg PO BID [facility]


   Last Admin: 03/01/16 17:24 Dose:  500 mg


Enalapril Maleate (Vasotec)  10 mg PO DAILY [facility]


   Last Admin: 03/01/16 08:50 Dose:  10 mg


Famotidine (Pepcid)  20 mg PO BID [facility]


   Last Admin: 03/01/16 17:24 Dose:  20 mg


Haloperidol Lactate (Haldol)  5 mg IM Q6 PRN


   PRN Reason: Agitation


Levetiracetam (Keppra)  500 mg PO BID [facility]


   Last Admin: 03/01/16 17:24 Dose:  500 mg


Lorazepam (Ativan)  1 mg IVP Q4 PRN


   PRN Reason: Agitation


   Last Admin: 02/24/16 07:23 Dose:  1 mg


Metoprolol Tartrate (Lopressor)  50 mg PO Q12 [facility]


   Last Admin: 03/01/16 21:40 Dose:  50 mg


Multivitamins/Vitamin C (Multi-Delyn Liquid)  5 ml PO DAILY [facility]


   Last Admin: 03/01/16 08:52 Dose:  5 ml


Nicotine (Nicoderm Cq)  1 patch TD DAILY [facility]


   Last Admin: 03/01/16 08:55 Dose:  1 patch


Quetiapine Fumarate (Seroquel)  25 mg PO DAILY@1700 [facility]


   Last Admin: 03/01/16 17:25 Dose:  25 mg











- Labs


Labs: 


 





 02/10/16 16:05 





 02/10/16 16:05 





 











PT  11.2 SECONDS (9.4-12.0)   12/14/15  05:20    


 


INR  1.05  (0.89-1.20)   12/14/15  05:20    


 


APTT  36.2 SECONDS (23.1-32.0)  H  12/14/15  05:20    














- Constitutional


Appears: Well, Non-toxic, No Acute Distress





- Head Exam


Head Exam: ATRAUMATIC, NORMAL INSPECTION, NORMOCEPHALIC





- Eye Exam


Eye Exam: EOMI, Normal appearance, PERRL


Pupil Exam: NORMAL ACCOMODATION, PERRL





- ENT Exam


ENT Exam: Mucous Membranes Moist, Normal Exam





- Neck Exam


Neck Exam: Full ROM, Normal Inspection.  absent: Lymphadenopathy





- Respiratory Exam


Respiratory Exam: Clear to Ausculation Bilateral, NORMAL BREATHING PATTERN





- Cardiovascular Exam


Cardiovascular Exam: REGULAR RHYTHM, +S1, +S2.  absent: Murmur





- GI/Abdominal Exam


GI & Abdominal Exam: Soft, Normal Bowel Sounds.  absent: Tenderness





- Extremities Exam


Extremities Exam: Full ROM, Normal Capillary Refill, Normal Inspection.  absent

: Joint Swelling, Pedal Edema





- Back Exam


Back Exam: NORMAL INSPECTION





- Neurological Exam


Neurological Exam: Alert, Awake, CN II-XII Intact, Normal Gait, Oriented x3





- Psychiatric Exam


Psychiatric exam: Normal Affect, Normal Mood





- Skin


Skin Exam: Dry, Intact, Normal Color, Warm





Assessment and Plan


(1) DVT prophylaxis


Assessment & Plan: 


scd and ae hsoe, no lovenox , ambualtion


Status: Acute





(2) Scrotal edema


Status: Chronic





(3) Agitation


Assessment & Plan: 


off 1:1


cont depakote/seroquel


ativan/haldol prn


Status: Acute





(4) CAD (coronary artery disease)


Assessment & Plan: 


cont meds


Status: Acute





(5) Smoking


Assessment & Plan: 


nicoderm


Status: Chronic

## 2016-03-03 RX ADMIN — Medication SCH ML: at 08:30

## 2016-03-03 RX ADMIN — DIVALPROEX SODIUM SCH MG: 500 TABLET, DELAYED RELEASE ORAL at 08:29

## 2016-03-03 RX ADMIN — DIVALPROEX SODIUM SCH MG: 500 TABLET, DELAYED RELEASE ORAL at 16:14

## 2016-03-03 NOTE — CP.PCM.PN
Subjective





- Date & Time of Evaluation


Date of Evaluation: 03/03/16


Time of Evaluation: 07:26





- Subjective


Subjective: 


pt comfortable in bed, no distress, no f/c, n/v/d. no abd pain or dysuria.  

calm and cooperative off 1:1


ros negative





Objective





- Vital Signs/Intake and Output


Vital Signs (last 24 hours): 


 











Temp Pulse Resp BP Pulse Ox


 


 98.6 F   66   20   129/74   98 


 


 03/02/16 15:30  03/02/16 21:25  03/02/16 09:03  03/02/16 21:25  03/02/16 15:30











- Medications


Medications: 


 Current Medications





Aspirin (Ecotrin)  81 mg PO DAILY [facility]


   Last Admin: 03/02/16 09:10 Dose:  81 mg


Divalproex Sodium (Depakote Dr(*Bid*))  500 mg PO BID [facility]


   Last Admin: 03/02/16 16:06 Dose:  500 mg


Enalapril Maleate (Vasotec)  10 mg PO DAILY [facility]


   Last Admin: 03/02/16 09:08 Dose:  10 mg


Famotidine (Pepcid)  20 mg PO BID [facility]


   Last Admin: 03/02/16 16:05 Dose:  20 mg


Haloperidol Lactate (Haldol)  5 mg IM Q6 PRN


   PRN Reason: Agitation


Levetiracetam (Keppra)  500 mg PO BID [facility]


   Last Admin: 03/02/16 16:06 Dose:  500 mg


Lorazepam (Ativan)  1 mg IVP Q4 PRN


   PRN Reason: Agitation


   Last Admin: 02/24/16 07:23 Dose:  1 mg


Metoprolol Tartrate (Lopressor)  50 mg PO Q12 [facility]


   Last Admin: 03/02/16 21:25 Dose:  50 mg


Multivitamins/Vitamin C (Multi-Delyn Liquid)  5 ml PO DAILY [facility]


   Last Admin: 03/02/16 09:10 Dose:  5 ml


Nicotine (Nicoderm Cq)  1 patch TD DAILY [facility]


   Last Admin: 03/02/16 09:09 Dose:  1 patch


Quetiapine Fumarate (Seroquel)  25 mg PO DAILY@1700 [facility]


   Last Admin: 03/02/16 16:07 Dose:  25 mg











- Labs


Labs: 


 





 02/10/16 16:05 





 02/10/16 16:05 





 











PT  11.2 SECONDS (9.4-12.0)   12/14/15  05:20    


 


INR  1.05  (0.89-1.20)   12/14/15  05:20    


 


APTT  36.2 SECONDS (23.1-32.0)  H  12/14/15  05:20    














- Constitutional


Appears: Well, Non-toxic, No Acute Distress





- Head Exam


Head Exam: ATRAUMATIC, NORMAL INSPECTION, NORMOCEPHALIC





- Eye Exam


Eye Exam: EOMI, Normal appearance, PERRL


Pupil Exam: NORMAL ACCOMODATION, PERRL





- ENT Exam


ENT Exam: Mucous Membranes Moist, Normal Exam





- Neck Exam


Neck Exam: Full ROM, Normal Inspection.  absent: Lymphadenopathy





- Respiratory Exam


Respiratory Exam: Clear to Ausculation Bilateral, NORMAL BREATHING PATTERN





- Cardiovascular Exam


Cardiovascular Exam: REGULAR RHYTHM, +S1, +S2.  absent: Murmur





- GI/Abdominal Exam


GI & Abdominal Exam: Soft, Normal Bowel Sounds.  absent: Tenderness





- Extremities Exam


Extremities Exam: Full ROM, Normal Capillary Refill, Normal Inspection.  absent

: Joint Swelling, Pedal Edema





- Back Exam


Back Exam: NORMAL INSPECTION





- Neurological Exam


Neurological Exam: Alert, Awake, CN II-XII Intact, Normal Gait, Oriented x3





- Psychiatric Exam


Psychiatric exam: Normal Affect, Normal Mood





- Skin


Skin Exam: Dry, Intact, Normal Color, Warm





Assessment and Plan


(1) DVT prophylaxis


Assessment & Plan: 


scd and ae hose


ambulation


Status: Acute





(2) Scrotal edema


Status: Chronic





(3) Agitation


Assessment & Plan: 


seroquel, depakote


ativan/haldol prn


Status: Acute





(4) CAD (coronary artery disease)


Assessment & Plan: 


cont all meds


Status: Acute





(5) Smoking


Assessment & Plan: 


nicoderm


Status: Chronic

## 2016-03-04 RX ADMIN — Medication SCH ML: at 09:00

## 2016-03-04 RX ADMIN — DIVALPROEX SODIUM SCH MG: 500 TABLET, DELAYED RELEASE ORAL at 15:59

## 2016-03-04 RX ADMIN — DIVALPROEX SODIUM SCH MG: 500 TABLET, DELAYED RELEASE ORAL at 09:00

## 2016-03-04 NOTE — CP.PCM.PN
Subjective





- Date & Time of Evaluation


Date of Evaluation: 03/04/16


Time of Evaluation: 12:00





- Subjective


Subjective: 


NO FEVER


DISCUSSED ON ROUNDS


CONT RX





Objective





- Vital Signs/Intake and Output


Vital Signs (last 24 hours): 


 











Temp Pulse Resp BP Pulse Ox


 


 97.7 F   64   20   143/86   98 


 


 03/04/16 08:39  03/04/16 09:00  03/04/16 09:00  03/04/16 09:00  03/04/16 08:39











- Medications


Medications: 


 Current Medications





Aspirin (Ecotrin)  81 mg PO DAILY [facility]


   Last Admin: 03/04/16 09:00 Dose:  81 mg


Divalproex Sodium (Depakote Dr(*Bid*))  500 mg PO BID [facility]


   Last Admin: 03/04/16 09:00 Dose:  500 mg


Enalapril Maleate (Vasotec)  10 mg PO DAILY [facility]


   Last Admin: 03/04/16 09:00 Dose:  10 mg


Famotidine (Pepcid)  20 mg PO BID [facility]


   Last Admin: 03/04/16 09:00 Dose:  20 mg


Haloperidol Lactate (Haldol)  5 mg IM Q6 PRN


   PRN Reason: Agitation


Levetiracetam (Keppra)  500 mg PO BID [facility]


   Last Admin: 03/04/16 09:00 Dose:  500 mg


Lorazepam (Ativan)  1 mg IVP Q4 PRN


   PRN Reason: Agitation


   Last Admin: 02/24/16 07:23 Dose:  1 mg


Metoprolol Tartrate (Lopressor)  50 mg PO Q12 [facility]


   Last Admin: 03/04/16 09:00 Dose:  50 mg


Multivitamins/Vitamin C (Multi-Delyn Liquid)  5 ml PO DAILY [facility]


   Last Admin: 03/04/16 09:00 Dose:  5 ml


Nicotine (Nicoderm Cq)  1 patch TD DAILY [facility]


   Last Admin: 03/04/16 10:54 Dose:  1 patch


Quetiapine Fumarate (Seroquel)  25 mg PO DAILY@1700 [facility]


   Last Admin: 03/03/16 16:14 Dose:  25 mg











- Labs


Labs: 


 





 02/10/16 16:05 





 02/10/16 16:05 





 











PT  11.2 SECONDS (9.4-12.0)   12/14/15  05:20    


 


INR  1.05  (0.89-1.20)   12/14/15  05:20    


 


APTT  36.2 SECONDS (23.1-32.0)  H  12/14/15  05:20    














Assessment and Plan


(1) Agitation


Status: Acute





(2) History of seizure


Status: Chronic





(3) NSTEMI (non-ST elevated myocardial infarction)


Status: Acute

## 2016-03-04 NOTE — CP.PCM.PN
Subjective





- Date & Time of Evaluation


Date of Evaluation: 03/04/16


Time of Evaluation: 11:41





- Subjective


Subjective: 


pt comfortable in bed, c/o mild nasal congestion but offered/refused medication 

for same. no f/c, n/v/d.


]ros negative excep tnasal congestion


no agitation off 1:1





Objective





- Vital Signs/Intake and Output


Vital Signs (last 24 hours): 


 











Temp Pulse Resp BP Pulse Ox


 


 97.7 F   64   20   143/86   98 


 


 03/04/16 08:39  03/04/16 09:00  03/04/16 09:00  03/04/16 09:00  03/04/16 08:39











- Medications


Medications: 


 Current Medications





Aspirin (Ecotrin)  81 mg PO DAILY [facility]


   Last Admin: 03/04/16 09:00 Dose:  81 mg


Divalproex Sodium (Depakote Dr(*Bid*))  500 mg PO BID [facility]


   Last Admin: 03/04/16 09:00 Dose:  500 mg


Enalapril Maleate (Vasotec)  10 mg PO DAILY [facility]


   Last Admin: 03/04/16 09:00 Dose:  10 mg


Famotidine (Pepcid)  20 mg PO BID [facility]


   Last Admin: 03/04/16 09:00 Dose:  20 mg


Haloperidol Lactate (Haldol)  5 mg IM Q6 PRN


   PRN Reason: Agitation


Levetiracetam (Keppra)  500 mg PO BID [facility]


   Last Admin: 03/04/16 09:00 Dose:  500 mg


Lorazepam (Ativan)  1 mg IVP Q4 PRN


   PRN Reason: Agitation


   Last Admin: 02/24/16 07:23 Dose:  1 mg


Metoprolol Tartrate (Lopressor)  50 mg PO Q12 [facility]


   Last Admin: 03/04/16 09:00 Dose:  50 mg


Multivitamins/Vitamin C (Multi-Delyn Liquid)  5 ml PO DAILY [facility]


   Last Admin: 03/04/16 09:00 Dose:  5 ml


Nicotine (Nicoderm Cq)  1 patch TD DAILY [facility]


   Last Admin: 03/04/16 10:54 Dose:  1 patch


Quetiapine Fumarate (Seroquel)  25 mg PO DAILY@1700 [facility]


   Last Admin: 03/03/16 16:14 Dose:  25 mg











- Labs


Labs: 


 





 02/10/16 16:05 





 02/10/16 16:05 





 











PT  11.2 SECONDS (9.4-12.0)   12/14/15  05:20    


 


INR  1.05  (0.89-1.20)   12/14/15  05:20    


 


APTT  36.2 SECONDS (23.1-32.0)  H  12/14/15  05:20    














- Constitutional


Appears: Well, Non-toxic, No Acute Distress





- Head Exam


Head Exam: ATRAUMATIC, NORMAL INSPECTION, NORMOCEPHALIC





- Eye Exam


Eye Exam: EOMI, Normal appearance, PERRL


Pupil Exam: NORMAL ACCOMODATION, PERRL





- ENT Exam


ENT Exam: Mucous Membranes Moist, Normal Exam





- Neck Exam


Neck Exam: Full ROM, Normal Inspection.  absent: Lymphadenopathy





- Respiratory Exam


Respiratory Exam: Clear to Ausculation Bilateral, NORMAL BREATHING PATTERN





- Cardiovascular Exam


Cardiovascular Exam: REGULAR RHYTHM, +S1, +S2.  absent: Murmur





- GI/Abdominal Exam


GI & Abdominal Exam: Soft, Normal Bowel Sounds.  absent: Tenderness





- Extremities Exam


Extremities Exam: Full ROM, Normal Capillary Refill, Normal Inspection.  absent

: Joint Swelling, Pedal Edema





- Back Exam


Back Exam: NORMAL INSPECTION





- Neurological Exam


Neurological Exam: Alert, Awake, CN II-XII Intact, Normal Gait, Oriented x3





- Psychiatric Exam


Psychiatric exam: Normal Affect, Normal Mood





- Skin


Skin Exam: Dry, Intact, Normal Color, Warm





Assessment and Plan


(1) DVT prophylaxis


Assessment & Plan: 


scd and ae hose. ambulation


Status: Acute





(2) Scrotal edema


Status: Chronic





(3) Agitation


Assessment & Plan: 


seroquel/depakote


ativan/haldol prn


now off 1:1


Status: Acute





(4) CAD (coronary artery disease)


Assessment & Plan: 


cont meds


Status: Acute





(5) Smoking


Assessment & Plan: 


nicoderm


Status: Chronic

## 2016-03-05 RX ADMIN — Medication SCH ML: at 09:19

## 2016-03-05 RX ADMIN — DIVALPROEX SODIUM SCH MG: 500 TABLET, DELAYED RELEASE ORAL at 17:32

## 2016-03-05 RX ADMIN — DIVALPROEX SODIUM SCH MG: 500 TABLET, DELAYED RELEASE ORAL at 09:17

## 2016-03-05 NOTE — CP.PCM.PN
Subjective





- Date & Time of Evaluation


Date of Evaluation: 03/05/16


Time of Evaluation: 14:02





- Subjective


Subjective: 


pt comfortable in bed, no complaints. no f/c, n/v/d.


]ros negative 


no agitation off 1:1





Objective





- Vital Signs/Intake and Output


Vital Signs (last 24 hours): 


 











Temp Pulse Resp BP Pulse Ox


 


 97.9 F   76   18   136/76   98 


 


 03/05/16 16:34  03/05/16 21:18  03/05/16 16:34  03/05/16 21:18  03/05/16 16:34











- Medications


Medications: 


 Current Medications





Aspirin (Ecotrin)  81 mg PO DAILY [facility]


   Last Admin: 03/05/16 09:17 Dose:  81 mg


Divalproex Sodium (Depakote Dr(*Bid*))  500 mg PO BID [facility]


   Last Admin: 03/05/16 17:32 Dose:  500 mg


Enalapril Maleate (Vasotec)  10 mg PO DAILY [facility]


   Last Admin: 03/05/16 09:18 Dose:  10 mg


Famotidine (Pepcid)  20 mg PO BID [facility]


   Last Admin: 03/05/16 17:32 Dose:  20 mg


Haloperidol Lactate (Haldol)  5 mg IM Q6 PRN


   PRN Reason: Agitation


Levetiracetam (Keppra)  500 mg PO BID [facility]


   Last Admin: 03/05/16 17:31 Dose:  500 mg


Lorazepam (Ativan)  1 mg IVP Q4 PRN


   PRN Reason: Agitation


   Last Admin: 02/24/16 07:23 Dose:  1 mg


Metoprolol Tartrate (Lopressor)  50 mg PO Q12 [facility]


   Last Admin: 03/05/16 21:18 Dose:  50 mg


Multivitamins/Vitamin C (Multi-Delyn Liquid)  5 ml PO DAILY [facility]


   Last Admin: 03/05/16 09:19 Dose:  5 ml


Nicotine (Nicoderm Cq)  1 patch TD DAILY [facility]


   Last Admin: 03/05/16 09:17 Dose:  1 patch


Quetiapine Fumarate (Seroquel)  25 mg PO DAILY@1700 [facility]


   Last Admin: 03/05/16 17:32 Dose:  25 mg











- Labs


Labs: 


 





 02/10/16 16:05 





 02/10/16 16:05 





 











PT  11.2 SECONDS (9.4-12.0)   12/14/15  05:20    


 


INR  1.05  (0.89-1.20)   12/14/15  05:20    


 


APTT  36.2 SECONDS (23.1-32.0)  H  12/14/15  05:20    














- Constitutional


Appears: Well, Non-toxic, No Acute Distress





- Head Exam


Head Exam: ATRAUMATIC, NORMAL INSPECTION, NORMOCEPHALIC





- Eye Exam


Eye Exam: EOMI, Normal appearance, PERRL


Pupil Exam: NORMAL ACCOMODATION, PERRL





- ENT Exam


ENT Exam: Mucous Membranes Moist, Normal Exam





- Neck Exam


Neck Exam: Full ROM, Normal Inspection.  absent: Lymphadenopathy





- Respiratory Exam


Respiratory Exam: Clear to Ausculation Bilateral, NORMAL BREATHING PATTERN





- Cardiovascular Exam


Cardiovascular Exam: REGULAR RHYTHM, +S1, +S2.  absent: Murmur





- GI/Abdominal Exam


GI & Abdominal Exam: Soft, Normal Bowel Sounds.  absent: Tenderness





- Extremities Exam


Extremities Exam: Full ROM, Normal Capillary Refill, Normal Inspection.  absent

: Joint Swelling, Pedal Edema





- Back Exam


Back Exam: NORMAL INSPECTION





- Neurological Exam


Neurological Exam: Alert, Awake, CN II-XII Intact, Normal Gait, Oriented x3





- Psychiatric Exam


Psychiatric exam: Normal Affect, Normal Mood





- Skin


Skin Exam: Dry, Intact, Normal Color, Warm





Assessment and Plan


(1) DVT prophylaxis


Assessment & Plan: 


scd adn ae hose, ambulation


Status: Acute





(2) Scrotal edema


Status: Chronic





(3) Agitation


Assessment & Plan: 


seroquel/depakote


ativan/haldol prn


Status: Acute





(4) CAD (coronary artery disease)


Assessment & Plan: 


cont meds


Status: Acute





(5) Smoking


Assessment & Plan: 


nicoderm


Status: Chronic

## 2016-03-06 RX ADMIN — Medication SCH ML: at 08:52

## 2016-03-06 RX ADMIN — DIVALPROEX SODIUM SCH MG: 500 TABLET, DELAYED RELEASE ORAL at 16:18

## 2016-03-06 RX ADMIN — DIVALPROEX SODIUM SCH MG: 500 TABLET, DELAYED RELEASE ORAL at 08:50

## 2016-03-06 NOTE — CP.PCM.PN
Subjective





- Date & Time of Evaluation


Date of Evaluation: 03/06/16


Time of Evaluation: 15:36





- Subjective


Subjective: 


pt comfortable in bed. no dsitress. no complaints.  no f/c, n/v/d.


ros negative





Objective





- Vital Signs/Intake and Output


Vital Signs (last 24 hours): 


 











Temp Pulse Resp BP Pulse Ox


 


 97 F L  70   20   119/92 H  100 


 


 03/06/16 08:30  03/06/16 08:51  03/06/16 08:30  03/06/16 08:51  03/06/16 08:30











- Medications


Medications: 


 Current Medications





Aspirin (Ecotrin)  81 mg PO DAILY [facility]


   Last Admin: 03/06/16 08:51 Dose:  81 mg


Divalproex Sodium (Depakote Dr(*Bid*))  500 mg PO BID [facility]


   Last Admin: 03/06/16 08:50 Dose:  500 mg


Enalapril Maleate (Vasotec)  10 mg PO DAILY [facility]


   Last Admin: 03/06/16 08:52 Dose:  10 mg


Famotidine (Pepcid)  20 mg PO BID [facility]


   Last Admin: 03/06/16 08:51 Dose:  20 mg


Haloperidol Lactate (Haldol)  5 mg IM Q6 PRN


   PRN Reason: Agitation


Levetiracetam (Keppra)  500 mg PO BID [facility]


   Last Admin: 03/06/16 08:51 Dose:  500 mg


Lorazepam (Ativan)  1 mg IVP Q4 PRN


   PRN Reason: Agitation


   Last Admin: 02/24/16 07:23 Dose:  1 mg


Metoprolol Tartrate (Lopressor)  50 mg PO Q12 [facility]


   Last Admin: 03/06/16 08:51 Dose:  50 mg


Multivitamins/Vitamin C (Multi-Delyn Liquid)  5 ml PO DAILY [facility]


   Last Admin: 03/06/16 08:52 Dose:  5 ml


Nicotine (Nicoderm Cq)  1 patch TD DAILY [facility]


   Last Admin: 03/06/16 08:52 Dose:  1 patch


Quetiapine Fumarate (Seroquel)  25 mg PO DAILY@1700 [facility]


   Last Admin: 03/05/16 17:32 Dose:  25 mg











- Labs


Labs: 


 





 02/10/16 16:05 





 02/10/16 16:05 





 











PT  11.2 SECONDS (9.4-12.0)   12/14/15  05:20    


 


INR  1.05  (0.89-1.20)   12/14/15  05:20    


 


APTT  36.2 SECONDS (23.1-32.0)  H  12/14/15  05:20    














- Constitutional


Appears: Well, Non-toxic, No Acute Distress





- Head Exam


Head Exam: ATRAUMATIC, NORMAL INSPECTION, NORMOCEPHALIC





- Eye Exam


Eye Exam: EOMI, Normal appearance, PERRL


Pupil Exam: NORMAL ACCOMODATION, PERRL





- ENT Exam


ENT Exam: Mucous Membranes Moist, Normal Exam





- Neck Exam


Neck Exam: Full ROM, Normal Inspection.  absent: Lymphadenopathy





- Respiratory Exam


Respiratory Exam: Clear to Ausculation Bilateral, NORMAL BREATHING PATTERN





- Cardiovascular Exam


Cardiovascular Exam: REGULAR RHYTHM, +S1, +S2.  absent: Murmur





- GI/Abdominal Exam


GI & Abdominal Exam: Soft, Normal Bowel Sounds.  absent: Tenderness





- Extremities Exam


Extremities Exam: Full ROM, Normal Capillary Refill, Normal Inspection.  absent

: Joint Swelling, Pedal Edema





- Back Exam


Back Exam: NORMAL INSPECTION





- Neurological Exam


Neurological Exam: Alert, Awake, CN II-XII Intact, Normal Gait, Oriented x3





- Psychiatric Exam


Psychiatric exam: Normal Affect, Normal Mood





- Skin


Skin Exam: Dry, Intact, Normal Color, Warm





Assessment and Plan


(1) DVT prophylaxis


Assessment & Plan: 


scd and ae hose


ambulation


Status: Acute





(2) Scrotal edema


Status: Chronic





(3) Agitation


Assessment & Plan: 


seroqeul/depokote


ativanhaldol prn


Status: Acute





(4) CAD (coronary artery disease)


Assessment & Plan: 


cont meds


Status: Acute





(5) Smoking


Assessment & Plan: 


nicoderm


Status: Chronic

## 2016-03-07 RX ADMIN — DIVALPROEX SODIUM SCH MG: 500 TABLET, DELAYED RELEASE ORAL at 08:55

## 2016-03-07 RX ADMIN — DIVALPROEX SODIUM SCH MG: 500 TABLET, DELAYED RELEASE ORAL at 16:24

## 2016-03-07 RX ADMIN — Medication SCH ML: at 08:57

## 2016-03-07 NOTE — CP.PCM.PN
Subjective





- Date & Time of Evaluation


Date of Evaluation: 03/07/16


Time of Evaluation: 07:32





- Subjective


Subjective: 





pt comfortable in bed. no dsitress. no complaints.  no f/c, n/v/d.


ros negative





Objective





- Vital Signs/Intake and Output


Vital Signs (last 24 hours): 


 











Temp Pulse Resp BP Pulse Ox


 


 98.1 F   73   18   144/88   97 


 


 03/07/16 00:22  03/07/16 00:22  03/07/16 00:22  03/07/16 00:22  03/07/16 00:22











- Medications


Medications: 


 Current Medications





Aspirin (Ecotrin)  81 mg PO DAILY [facility]


   Last Admin: 03/06/16 08:51 Dose:  81 mg


Divalproex Sodium (Depakote Dr(*Bid*))  500 mg PO BID [facility]


   Last Admin: 03/06/16 16:18 Dose:  500 mg


Enalapril Maleate (Vasotec)  10 mg PO DAILY [facility]


   Last Admin: 03/06/16 08:52 Dose:  10 mg


Famotidine (Pepcid)  20 mg PO BID [facility]


   Last Admin: 03/06/16 16:18 Dose:  20 mg


Haloperidol Lactate (Haldol)  5 mg IM Q6 PRN


   PRN Reason: Agitation


Levetiracetam (Keppra)  500 mg PO BID [facility]


   Last Admin: 03/06/16 16:18 Dose:  500 mg


Lorazepam (Ativan)  1 mg IVP Q4 PRN


   PRN Reason: Agitation


   Last Admin: 02/24/16 07:23 Dose:  1 mg


Metoprolol Tartrate (Lopressor)  50 mg PO Q12 [facility]


   Last Admin: 03/06/16 20:48 Dose:  50 mg


Multivitamins/Vitamin C (Multi-Delyn Liquid)  5 ml PO DAILY [facility]


   Last Admin: 03/06/16 08:52 Dose:  5 ml


Nicotine (Nicoderm Cq)  1 patch TD DAILY [facility]


   Last Admin: 03/06/16 08:52 Dose:  1 patch


Quetiapine Fumarate (Seroquel)  25 mg PO DAILY@1700 [facility]


   Last Admin: 03/06/16 16:18 Dose:  25 mg











- Labs


Labs: 


 





 02/10/16 16:05 





 02/10/16 16:05 





 











PT  11.2 SECONDS (9.4-12.0)   12/14/15  05:20    


 


INR  1.05  (0.89-1.20)   12/14/15  05:20    


 


APTT  36.2 SECONDS (23.1-32.0)  H  12/14/15  05:20    














- Constitutional


Appears: Well, Non-toxic, No Acute Distress





- Head Exam


Head Exam: ATRAUMATIC, NORMAL INSPECTION, NORMOCEPHALIC





- Eye Exam


Eye Exam: EOMI, Normal appearance, PERRL


Pupil Exam: NORMAL ACCOMODATION, PERRL





- ENT Exam


ENT Exam: Mucous Membranes Moist, Normal Exam





- Neck Exam


Neck Exam: Full ROM, Normal Inspection.  absent: Lymphadenopathy





- Respiratory Exam


Respiratory Exam: Clear to Ausculation Bilateral, NORMAL BREATHING PATTERN





- Cardiovascular Exam


Cardiovascular Exam: REGULAR RHYTHM, +S1, +S2.  absent: Murmur





- GI/Abdominal Exam


GI & Abdominal Exam: Soft, Normal Bowel Sounds.  absent: Tenderness





- Extremities Exam


Extremities Exam: Full ROM, Normal Capillary Refill, Normal Inspection.  absent

: Joint Swelling, Pedal Edema





- Back Exam


Back Exam: NORMAL INSPECTION





- Neurological Exam


Neurological Exam: Alert, Awake, CN II-XII Intact, Normal Gait, Oriented x3





- Psychiatric Exam


Psychiatric exam: Normal Affect, Normal Mood





- Skin


Skin Exam: Dry, Intact, Normal Color, Warm





Assessment and Plan


(1) DVT prophylaxis


Assessment & Plan: 


scd and ae hose, ambulation


Status: Acute





(2) Scrotal edema


Status: Chronic





(3) Agitation


Assessment & Plan: 


seroqeul/depakote


ativan/haldol prn


Status: Acute





(4) CAD (coronary artery disease)


Assessment & Plan: 


cont meds


Status: Acute





(5) Smoking


Assessment & Plan: 


nicoderm


Status: Chronic

## 2016-03-08 RX ADMIN — Medication SCH ML: at 08:47

## 2016-03-08 RX ADMIN — DIVALPROEX SODIUM SCH MG: 500 TABLET, DELAYED RELEASE ORAL at 08:48

## 2016-03-08 RX ADMIN — DIVALPROEX SODIUM SCH MG: 500 TABLET, DELAYED RELEASE ORAL at 16:29

## 2016-03-08 NOTE — CP.PCM.PN
Subjective





- Date & Time of Evaluation


Date of Evaluation: 03/08/16


Time of Evaluation: 07:34





- Subjective


Subjective: 


pt comfortable in bed, required motrin for joint pains yesterday but has not 

pain today. no f/c, n/v/d. 


epdning guardian


ros negative





Objective





- Vital Signs/Intake and Output


Vital Signs (last 24 hours): 


 











Temp Pulse Resp BP Pulse Ox


 


 97.9 F   64   20   132/79   100 


 


 03/08/16 09:00  03/08/16 08:47  03/08/16 08:06  03/08/16 08:47  03/08/16 08:06











- Medications


Medications: 


 Current Medications





Aspirin (Ecotrin)  81 mg PO DAILY [facility]


   Last Admin: 03/08/16 08:48 Dose:  81 mg


Divalproex Sodium (Depakote Dr(*Bid*))  500 mg PO BID [facility]


   Last Admin: 03/08/16 08:48 Dose:  500 mg


Enalapril Maleate (Vasotec)  10 mg PO DAILY [facility]


   Last Admin: 03/08/16 08:48 Dose:  10 mg


Famotidine (Pepcid)  20 mg PO BID [facility]


   Last Admin: 03/08/16 08:47 Dose:  20 mg


Haloperidol Lactate (Haldol)  5 mg IM Q6 PRN


   PRN Reason: Agitation


Ibuprofen (Motrin Tab)  600 mg PO Q8 PRN


   PRN Reason: Pain, moderate (4-7)


Levetiracetam (Keppra)  500 mg PO BID [facility]


   Last Admin: 03/08/16 08:47 Dose:  500 mg


Lorazepam (Ativan)  1 mg IVP Q4 PRN


   PRN Reason: Agitation


   Last Admin: 02/24/16 07:23 Dose:  1 mg


Metoprolol Tartrate (Lopressor)  50 mg PO Q12 [facility]


   Last Admin: 03/08/16 08:47 Dose:  50 mg


Multivitamins/Vitamin C (Multi-Delyn Liquid)  5 ml PO DAILY [facility]


   Last Admin: 03/08/16 08:47 Dose:  5 ml


Nicotine (Nicoderm Cq)  1 patch TD DAILY [facility]


   Last Admin: 03/08/16 10:13 Dose:  1 patch


Quetiapine Fumarate (Seroquel)  25 mg PO DAILY@1700 [facility]


   Last Admin: 03/07/16 16:24 Dose:  25 mg











- Labs


Labs: 


 





 02/10/16 16:05 





 02/10/16 16:05 





 











PT  11.2 SECONDS (9.4-12.0)   12/14/15  05:20    


 


INR  1.05  (0.89-1.20)   12/14/15  05:20    


 


APTT  36.2 SECONDS (23.1-32.0)  H  12/14/15  05:20    














- Constitutional


Appears: Well, Non-toxic, No Acute Distress





- Head Exam


Head Exam: ATRAUMATIC, NORMAL INSPECTION, NORMOCEPHALIC





- Eye Exam


Eye Exam: EOMI, Normal appearance, PERRL


Pupil Exam: NORMAL ACCOMODATION, PERRL





- ENT Exam


ENT Exam: Mucous Membranes Moist, Normal Exam





- Neck Exam


Neck Exam: Full ROM, Normal Inspection.  absent: Lymphadenopathy





- Respiratory Exam


Respiratory Exam: Clear to Ausculation Bilateral, NORMAL BREATHING PATTERN





- Cardiovascular Exam


Cardiovascular Exam: REGULAR RHYTHM, +S1, +S2.  absent: Murmur





- GI/Abdominal Exam


GI & Abdominal Exam: Soft, Normal Bowel Sounds.  absent: Tenderness





- Extremities Exam


Extremities Exam: Full ROM, Normal Capillary Refill, Normal Inspection.  absent

: Joint Swelling, Pedal Edema





- Back Exam


Back Exam: NORMAL INSPECTION





- Neurological Exam


Neurological Exam: Alert, Awake, CN II-XII Intact, Normal Gait, Oriented x3





- Psychiatric Exam


Psychiatric exam: Normal Affect, Normal Mood





- Skin


Skin Exam: Dry, Intact, Normal Color, Warm





Assessment and Plan


(1) DVT prophylaxis


Assessment & Plan: 


scd adn ae hose, ambulaiton


Status: Acute





(2) Scrotal edema


Status: Chronic





(3) Agitation


Assessment & Plan: 


seroquel/depakote


ativan/haldol prn


Status: Acute





(4) CAD (coronary artery disease)


Assessment & Plan: 


cont meds





Status: Acute





(5) Smoking


Assessment & Plan: 


nicoderm


Status: Chronic

## 2016-03-09 RX ADMIN — Medication SCH ML: at 08:34

## 2016-03-09 RX ADMIN — DIVALPROEX SODIUM SCH MG: 500 TABLET, DELAYED RELEASE ORAL at 16:30

## 2016-03-09 RX ADMIN — DIVALPROEX SODIUM SCH MG: 500 TABLET, DELAYED RELEASE ORAL at 08:34

## 2016-03-09 NOTE — CP.PCM.PN
Subjective





- Date & Time of Evaluation


Date of Evaluation: 03/09/16


Time of Evaluation: 07:54





- Subjective


Subjective: 


pt comfortable in bed, denies f/c, n/v/d. mild muscle pains


ros negative except muscle pains





Objective





- Vital Signs/Intake and Output


Vital Signs (last 24 hours): 


 











Temp Pulse Resp BP Pulse Ox


 


 97.2 F L  73   20   139/78   99 


 


 03/09/16 00:46  03/09/16 00:46  03/09/16 00:46  03/09/16 00:46  03/09/16 00:46











- Medications


Medications: 


 Current Medications





Aspirin (Ecotrin)  81 mg PO DAILY [facility]


   Last Admin: 03/08/16 08:48 Dose:  81 mg


Divalproex Sodium (Depakote Dr(*Bid*))  500 mg PO BID [facility]


   Last Admin: 03/08/16 16:29 Dose:  500 mg


Enalapril Maleate (Vasotec)  10 mg PO DAILY [facility]


   Last Admin: 03/08/16 08:48 Dose:  10 mg


Famotidine (Pepcid)  20 mg PO BID [facility]


   Last Admin: 03/08/16 16:29 Dose:  20 mg


Haloperidol Lactate (Haldol)  5 mg IM Q6 PRN


   PRN Reason: Agitation


Ibuprofen (Motrin Tab)  600 mg PO Q8 PRN


   PRN Reason: Pain, moderate (4-7)


Levetiracetam (Keppra)  500 mg PO BID [facility]


   Last Admin: 03/08/16 16:29 Dose:  500 mg


Lorazepam (Ativan)  1 mg IVP Q4 PRN


   PRN Reason: Agitation


   Last Admin: 02/24/16 07:23 Dose:  1 mg


Metoprolol Tartrate (Lopressor)  50 mg PO Q12 [facility]


   Last Admin: 03/08/16 20:33 Dose:  50 mg


Multivitamins/Vitamin C (Multi-Delyn Liquid)  5 ml PO DAILY [facility]


   Last Admin: 03/08/16 08:47 Dose:  5 ml


Nicotine (Nicoderm Cq)  1 patch TD DAILY [facility]


   Last Admin: 03/08/16 10:13 Dose:  1 patch


Quetiapine Fumarate (Seroquel)  25 mg PO DAILY@1700 [facility]


   Last Admin: 03/08/16 16:29 Dose:  25 mg











- Labs


Labs: 


 





 02/10/16 16:05 





 02/10/16 16:05 





 











PT  11.2 SECONDS (9.4-12.0)   12/14/15  05:20    


 


INR  1.05  (0.89-1.20)   12/14/15  05:20    


 


APTT  36.2 SECONDS (23.1-32.0)  H  12/14/15  05:20    














- Constitutional


Appears: Well, Non-toxic, No Acute Distress





- Head Exam


Head Exam: ATRAUMATIC, NORMAL INSPECTION, NORMOCEPHALIC





- Eye Exam


Eye Exam: EOMI, Normal appearance, PERRL


Pupil Exam: NORMAL ACCOMODATION, PERRL





- ENT Exam


ENT Exam: Mucous Membranes Moist, Normal Exam





- Neck Exam


Neck Exam: Full ROM, Normal Inspection.  absent: Lymphadenopathy





- Respiratory Exam


Respiratory Exam: Clear to Ausculation Bilateral, NORMAL BREATHING PATTERN





- Cardiovascular Exam


Cardiovascular Exam: REGULAR RHYTHM, +S1, +S2.  absent: Murmur





- GI/Abdominal Exam


GI & Abdominal Exam: Soft, Normal Bowel Sounds.  absent: Tenderness





- Extremities Exam


Extremities Exam: Full ROM, Normal Capillary Refill, Normal Inspection.  absent

: Joint Swelling, Pedal Edema





- Back Exam


Back Exam: NORMAL INSPECTION





- Neurological Exam


Neurological Exam: Alert, Awake, CN II-XII Intact, Normal Gait, Oriented x3





- Psychiatric Exam


Psychiatric exam: Normal Affect, Normal Mood





- Skin


Skin Exam: Dry, Intact, Normal Color, Warm





Assessment and Plan


(1) Scrotal edema


Status: Chronic





(2) DVT prophylaxis


Assessment & Plan: 


scd and aehose, ambulation


Status: Acute





(3) Agitation


Assessment & Plan: 


seroqeul/depakote


ativan/haldol prn


Status: Acute





(4) CAD (coronary artery disease)


Assessment & Plan: 


cont all meds


Status: Acute





(5) Smoking


Assessment & Plan: 


nicoderm


Status: Chronic

## 2016-03-10 RX ADMIN — DIVALPROEX SODIUM SCH MG: 500 TABLET, DELAYED RELEASE ORAL at 16:34

## 2016-03-10 RX ADMIN — Medication SCH ML: at 08:37

## 2016-03-10 RX ADMIN — DIVALPROEX SODIUM SCH MG: 500 TABLET, DELAYED RELEASE ORAL at 10:59

## 2016-03-10 NOTE — CP.PCM.PN
Subjective





- Date & Time of Evaluation


Date of Evaluation: 03/10/16


Time of Evaluation: 08:13





- Subjective


Subjective: 


pt comfortable in bed, no distress, no pain. no f/c, n/v/d


ros negative





Objective





- Vital Signs/Intake and Output


Vital Signs (last 24 hours): 


 











Temp Pulse Resp BP Pulse Ox


 


 97.9 F   87   20   157/108 H  96 


 


 03/10/16 00:55  03/10/16 00:55  03/10/16 00:55  03/10/16 00:55  03/10/16 00:55











- Medications


Medications: 


 Current Medications





Aspirin (Ecotrin)  81 mg PO DAILY [facility]


   Last Admin: 03/09/16 08:34 Dose:  81 mg


Divalproex Sodium (Depakote Dr(*Bid*))  500 mg PO BID [facility]


   Last Admin: 03/09/16 16:30 Dose:  500 mg


Enalapril Maleate (Vasotec)  10 mg PO DAILY [facility]


   Last Admin: 03/09/16 08:34 Dose:  10 mg


Famotidine (Pepcid)  20 mg PO BID [facility]


   Last Admin: 03/09/16 16:30 Dose:  20 mg


Haloperidol Lactate (Haldol)  5 mg IM Q6 PRN


   PRN Reason: Agitation


Ibuprofen (Motrin Tab)  600 mg PO Q8 PRN


   PRN Reason: Pain, moderate (4-7)


Levetiracetam (Keppra)  500 mg PO BID [facility]


   Last Admin: 03/09/16 16:30 Dose:  500 mg


Lorazepam (Ativan)  1 mg IVP Q4 PRN


   PRN Reason: Agitation


   Last Admin: 03/09/16 14:05 Dose:  1 mg


Metoprolol Tartrate (Lopressor)  50 mg PO Q12 [facility]


   Last Admin: 03/09/16 21:44 Dose:  50 mg


Multivitamins/Vitamin C (Multi-Delyn Liquid)  5 ml PO DAILY [facility]


   Last Admin: 03/09/16 08:34 Dose:  5 ml


Nicotine (Nicoderm Cq)  1 patch TD DAILY [facility]


   Last Admin: 03/09/16 08:36 Dose:  1 patch


Quetiapine Fumarate (Seroquel)  25 mg PO DAILY@1700 [facility]


   Last Admin: 03/09/16 16:30 Dose:  25 mg











- Labs


Labs: 


 





 02/10/16 16:05 





 02/10/16 16:05 





 











PT  11.2 SECONDS (9.4-12.0)   12/14/15  05:20    


 


INR  1.05  (0.89-1.20)   12/14/15  05:20    


 


APTT  36.2 SECONDS (23.1-32.0)  H  12/14/15  05:20    














- Constitutional


Appears: Well, Non-toxic, No Acute Distress





- Head Exam


Head Exam: ATRAUMATIC, NORMAL INSPECTION, NORMOCEPHALIC





- Eye Exam


Eye Exam: EOMI, Normal appearance, PERRL


Pupil Exam: NORMAL ACCOMODATION, PERRL





- ENT Exam


ENT Exam: Mucous Membranes Moist, Normal Exam





- Neck Exam


Neck Exam: Full ROM, Normal Inspection.  absent: Lymphadenopathy





- Respiratory Exam


Respiratory Exam: Clear to Ausculation Bilateral, NORMAL BREATHING PATTERN





- Cardiovascular Exam


Cardiovascular Exam: REGULAR RHYTHM, +S1, +S2.  absent: Murmur





- GI/Abdominal Exam


GI & Abdominal Exam: Soft, Normal Bowel Sounds.  absent: Tenderness





- Extremities Exam


Extremities Exam: Full ROM, Normal Capillary Refill, Normal Inspection.  absent

: Joint Swelling, Pedal Edema





- Back Exam


Back Exam: NORMAL INSPECTION





- Neurological Exam


Neurological Exam: Alert, Awake, CN II-XII Intact, Normal Gait, Oriented x3





- Psychiatric Exam


Psychiatric exam: Normal Affect, Normal Mood





- Skin


Skin Exam: Dry, Intact, Normal Color, Warm





Assessment and Plan


(1) Scrotal edema


Status: Chronic





(2) DVT prophylaxis


Assessment & Plan: 


scd and ae hose, ambulation


Status: Acute





(3) Agitation


Assessment & Plan: 


seroqeul/depakote


ativan/haldol prn


Status: Acute





(4) CAD (coronary artery disease)


Assessment & Plan: 


cont meds


Status: Acute





(5) Smoking


Assessment & Plan: 


nicoderm


Status: Chronic

## 2016-03-11 RX ADMIN — Medication SCH ML: at 09:39

## 2016-03-11 RX ADMIN — DIVALPROEX SODIUM SCH MG: 500 TABLET, DELAYED RELEASE ORAL at 16:06

## 2016-03-11 RX ADMIN — DIVALPROEX SODIUM SCH MG: 500 TABLET, DELAYED RELEASE ORAL at 09:38

## 2016-03-11 NOTE — CP.PCM.PN
Subjective





- Date & Time of Evaluation


Date of Evaluation: 03/11/16


Time of Evaluation: 10:00





- Subjective


Subjective: 


pt evaluated in bed, no distress, no f/c, n/v/d. no complaitns


ros negative. pt is calm and cooperative





Objective





- Vital Signs/Intake and Output


Vital Signs (last 24 hours): 


 











Temp Pulse Resp BP Pulse Ox


 


 98.1 F   81   20   131/87   99 


 


 03/11/16 08:35  03/11/16 09:37  03/11/16 08:35  03/11/16 09:37  03/11/16 08:35











- Medications


Medications: 


 Current Medications





Aspirin (Ecotrin)  81 mg PO DAILY [facility]


   Last Admin: 03/11/16 09:38 Dose:  81 mg


Divalproex Sodium (Depakote Dr(*Bid*))  500 mg PO BID [facility]


   Last Admin: 03/11/16 09:38 Dose:  500 mg


Enalapril Maleate (Vasotec)  10 mg PO DAILY [facility]


   Last Admin: 03/11/16 09:39 Dose:  10 mg


Famotidine (Pepcid)  20 mg PO BID [facility]


   Last Admin: 03/11/16 09:38 Dose:  20 mg


Haloperidol Lactate (Haldol)  5 mg IM Q6 PRN


   PRN Reason: Agitation


Ibuprofen (Motrin Tab)  600 mg PO Q8 PRN


   PRN Reason: Pain, moderate (4-7)


Levetiracetam (Keppra)  500 mg PO BID [facility]


   Last Admin: 03/11/16 09:38 Dose:  500 mg


Lorazepam (Ativan)  1 mg IVP Q4 PRN


   PRN Reason: Agitation


   Last Admin: 03/09/16 14:05 Dose:  1 mg


Metoprolol Tartrate (Lopressor)  50 mg PO Q12 [facility]


   Last Admin: 03/11/16 09:37 Dose:  50 mg


Multivitamins/Vitamin C (Multi-Delyn Liquid)  5 ml PO DAILY [facility]


   Last Admin: 03/11/16 09:39 Dose:  5 ml


Nicotine (Nicoderm Cq)  1 patch TD DAILY [facility]


   Last Admin: 03/11/16 09:37 Dose:  1 patch


Quetiapine Fumarate (Seroquel)  25 mg PO DAILY@1700 [facility]


   Last Admin: 03/10/16 16:35 Dose:  25 mg











- Labs


Labs: 


 





 02/10/16 16:05 





 02/10/16 16:05 





 











PT  11.2 SECONDS (9.4-12.0)   12/14/15  05:20    


 


INR  1.05  (0.89-1.20)   12/14/15  05:20    


 


APTT  36.2 SECONDS (23.1-32.0)  H  12/14/15  05:20    














- Constitutional


Appears: Well, Non-toxic, No Acute Distress





- Head Exam


Head Exam: ATRAUMATIC, NORMAL INSPECTION, NORMOCEPHALIC





- Eye Exam


Eye Exam: EOMI, Normal appearance, PERRL


Pupil Exam: NORMAL ACCOMODATION, PERRL





- ENT Exam


ENT Exam: Mucous Membranes Moist, Normal Exam





- Neck Exam


Neck Exam: Full ROM, Normal Inspection.  absent: Lymphadenopathy





- Respiratory Exam


Respiratory Exam: Clear to Ausculation Bilateral, NORMAL BREATHING PATTERN





- Cardiovascular Exam


Cardiovascular Exam: REGULAR RHYTHM, +S1, +S2.  absent: Murmur





- GI/Abdominal Exam


GI & Abdominal Exam: Soft, Normal Bowel Sounds.  absent: Tenderness





- Extremities Exam


Extremities Exam: Full ROM, Normal Capillary Refill, Normal Inspection.  absent

: Joint Swelling, Pedal Edema





- Back Exam


Back Exam: NORMAL INSPECTION





- Neurological Exam


Neurological Exam: Alert, Awake, CN II-XII Intact, Normal Gait, Oriented x3





- Psychiatric Exam


Psychiatric exam: Normal Affect, Normal Mood





- Skin


Skin Exam: Dry, Intact, Normal Color, Warm





Assessment and Plan


(1) Scrotal edema


Status: Chronic





(2) DVT prophylaxis


Assessment & Plan: 


scd nad aehose, ambulation


Status: Acute





(3) Agitation


Assessment & Plan: 


seroquel/depakote


ativan/haldol prn


Status: Acute





(4) CAD (coronary artery disease)


Assessment & Plan: 


cont all meds


Status: Acute





(5) Smoking


Assessment & Plan: 


nicoderm


Status: Chronic

## 2016-03-12 RX ADMIN — Medication SCH ML: at 09:15

## 2016-03-12 RX ADMIN — DIVALPROEX SODIUM SCH MG: 500 TABLET, DELAYED RELEASE ORAL at 09:16

## 2016-03-12 RX ADMIN — DIVALPROEX SODIUM SCH MG: 500 TABLET, DELAYED RELEASE ORAL at 16:34

## 2016-03-12 NOTE — CP.PCM.PN
Subjective





- Date & Time of Evaluation


Date of Evaluation: 03/12/16


Time of Evaluation: 08:12





- Subjective


Subjective: 


pt comfotable in bed, still no compalitns. no distress, calm adn cooperative. 

no f/c, n/v/d.


ros negative.





Objective





- Vital Signs/Intake and Output


Vital Signs (last 24 hours): 


 











Temp Pulse Resp BP Pulse Ox


 


 98.6 F   83   20   136/77   97 


 


 03/11/16 16:30  03/11/16 21:45  03/11/16 08:35  03/11/16 21:45  03/11/16 16:30











- Medications


Medications: 


 Current Medications





Aspirin (Ecotrin)  81 mg PO DAILY [facility]


   Last Admin: 03/11/16 09:38 Dose:  81 mg


Divalproex Sodium (Depakote Dr(*Bid*))  500 mg PO BID [facility]


   Last Admin: 03/11/16 16:06 Dose:  500 mg


Enalapril Maleate (Vasotec)  10 mg PO DAILY [facility]


   Last Admin: 03/11/16 09:39 Dose:  10 mg


Famotidine (Pepcid)  20 mg PO BID [facility]


   Last Admin: 03/11/16 16:06 Dose:  20 mg


Haloperidol Lactate (Haldol)  5 mg IM Q6 PRN


   PRN Reason: Agitation


   Last Admin: 03/11/16 19:53 Dose:  5 mg


Ibuprofen (Motrin Tab)  600 mg PO Q8 PRN


   PRN Reason: Pain, moderate (4-7)


Levetiracetam (Keppra)  500 mg PO BID [facility]


   Last Admin: 03/11/16 16:06 Dose:  500 mg


Lorazepam (Ativan)  1 mg IVP Q4 PRN


   PRN Reason: Agitation


   Last Admin: 03/09/16 14:05 Dose:  1 mg


Metoprolol Tartrate (Lopressor)  50 mg PO Q12 [facility]


   Last Admin: 03/11/16 21:45 Dose:  50 mg


Multivitamins/Vitamin C (Multi-Delyn Liquid)  5 ml PO DAILY [facility]


   Last Admin: 03/11/16 09:39 Dose:  5 ml


Nicotine (Nicoderm Cq)  1 patch TD DAILY [facility]


   Last Admin: 03/11/16 09:37 Dose:  1 patch


Quetiapine Fumarate (Seroquel)  25 mg PO DAILY@1700 [facility]


   Last Admin: 03/11/16 16:06 Dose:  25 mg











- Labs


Labs: 


 





 02/10/16 16:05 





 02/10/16 16:05 





 











PT  11.2 SECONDS (9.4-12.0)   12/14/15  05:20    


 


INR  1.05  (0.89-1.20)   12/14/15  05:20    


 


APTT  36.2 SECONDS (23.1-32.0)  H  12/14/15  05:20    














- Constitutional


Appears: Well, Non-toxic, No Acute Distress





- Head Exam


Head Exam: ATRAUMATIC, NORMAL INSPECTION, NORMOCEPHALIC





- Eye Exam


Eye Exam: EOMI, Normal appearance, PERRL


Pupil Exam: NORMAL ACCOMODATION, PERRL





- ENT Exam


ENT Exam: Mucous Membranes Moist, Normal Exam





- Neck Exam


Neck Exam: Full ROM, Normal Inspection.  absent: Lymphadenopathy





- Respiratory Exam


Respiratory Exam: Clear to Ausculation Bilateral, NORMAL BREATHING PATTERN





- Cardiovascular Exam


Cardiovascular Exam: REGULAR RHYTHM, +S1, +S2.  absent: Murmur





- GI/Abdominal Exam


GI & Abdominal Exam: Soft, Normal Bowel Sounds.  absent: Tenderness





- Extremities Exam


Extremities Exam: Full ROM, Normal Capillary Refill, Normal Inspection.  absent

: Joint Swelling, Pedal Edema





- Back Exam


Back Exam: NORMAL INSPECTION





- Neurological Exam


Neurological Exam: Alert, Awake, CN II-XII Intact, Normal Gait, Oriented x3





- Psychiatric Exam


Psychiatric exam: Normal Affect, Normal Mood





- Skin


Skin Exam: Dry, Intact, Normal Color, Warm





Assessment and Plan


(1) Scrotal edema


Status: Chronic





(2) DVT prophylaxis


Assessment & Plan: 


scd and ae hsoe


ambulation


Status: Acute





(3) Agitation


Assessment & Plan: 


seroquel/depakote


ativan/haldol prn


Status: Acute





(4) CAD (coronary artery disease)


Assessment & Plan: 


cont meds


Status: Acute





(5) Smoking


Assessment & Plan: 


nicoderm


Status: Chronic

## 2016-03-13 RX ADMIN — DIVALPROEX SODIUM SCH MG: 500 TABLET, DELAYED RELEASE ORAL at 09:37

## 2016-03-13 RX ADMIN — Medication SCH ML: at 09:37

## 2016-03-13 RX ADMIN — DIVALPROEX SODIUM SCH MG: 500 TABLET, DELAYED RELEASE ORAL at 16:00

## 2016-03-13 NOTE — CP.PCM.PN
Subjective





- Date & Time of Evaluation


Date of Evaluation: 03/13/16


Time of Evaluation: 10:31





- Subjective


Subjective: 


pt comfortable in bed, states feels strong. no f/c, n/v/d. no copmlaints no 

changes in status


ros negative





Objective





- Vital Signs/Intake and Output


Vital Signs (last 24 hours): 


 











Temp Pulse Resp BP Pulse Ox


 


 97.7 F   65   20   121/76   98 


 


 03/13/16 08:35  03/13/16 09:42  03/13/16 08:35  03/13/16 09:42  03/13/16 08:35











- Medications


Medications: 


 Current Medications





Aspirin (Ecotrin)  81 mg PO DAILY [facility]


   Last Admin: 03/13/16 09:37 Dose:  81 mg


Divalproex Sodium (Depakote Dr(*Bid*))  500 mg PO BID [facility]


   Last Admin: 03/13/16 09:37 Dose:  500 mg


Enalapril Maleate (Vasotec)  10 mg PO DAILY [facility]


   Last Admin: 03/13/16 09:36 Dose:  10 mg


Famotidine (Pepcid)  20 mg PO BID [facility]


   Last Admin: 03/13/16 09:37 Dose:  20 mg


Haloperidol Lactate (Haldol)  5 mg IM Q6 PRN


   PRN Reason: Agitation


   Last Admin: 03/11/16 19:53 Dose:  5 mg


Ibuprofen (Motrin Tab)  600 mg PO Q8 PRN


   PRN Reason: Pain, moderate (4-7)


Levetiracetam (Keppra)  500 mg PO BID [facility]


   Last Admin: 03/13/16 09:37 Dose:  500 mg


Lorazepam (Ativan)  1 mg IVP Q4 PRN


   PRN Reason: Agitation


   Last Admin: 03/09/16 14:05 Dose:  1 mg


Metoprolol Tartrate (Lopressor)  50 mg PO Q12 [facility]


   Last Admin: 03/13/16 09:42 Dose:  50 mg


Multivitamins/Vitamin C (Multi-Delyn Liquid)  5 ml PO DAILY [facility]


   Last Admin: 03/13/16 09:37 Dose:  5 ml


Nicotine (Nicoderm Cq)  1 patch TD DAILY [facility]


   Last Admin: 03/13/16 09:38 Dose:  1 patch


Quetiapine Fumarate (Seroquel)  25 mg PO DAILY@1700 [facility]


   Last Admin: 03/12/16 16:34 Dose:  25 mg











- Labs


Labs: 


 





 02/10/16 16:05 





 02/10/16 16:05 





 











PT  11.2 SECONDS (9.4-12.0)   12/14/15  05:20    


 


INR  1.05  (0.89-1.20)   12/14/15  05:20    


 


APTT  36.2 SECONDS (23.1-32.0)  H  12/14/15  05:20    














- Constitutional


Appears: Well, Non-toxic, No Acute Distress





- Head Exam


Head Exam: ATRAUMATIC, NORMAL INSPECTION, NORMOCEPHALIC





- Eye Exam


Eye Exam: EOMI, Normal appearance, PERRL


Pupil Exam: NORMAL ACCOMODATION, PERRL





- ENT Exam


ENT Exam: Mucous Membranes Moist, Normal Exam





- Neck Exam


Neck Exam: Full ROM, Normal Inspection.  absent: Lymphadenopathy





- Respiratory Exam


Respiratory Exam: Clear to Ausculation Bilateral, NORMAL BREATHING PATTERN





- Cardiovascular Exam


Cardiovascular Exam: REGULAR RHYTHM, +S1, +S2.  absent: Murmur





- GI/Abdominal Exam


GI & Abdominal Exam: Soft, Normal Bowel Sounds.  absent: Tenderness





- Extremities Exam


Extremities Exam: Full ROM, Normal Capillary Refill, Normal Inspection.  absent

: Joint Swelling, Pedal Edema





- Back Exam


Back Exam: NORMAL INSPECTION





- Neurological Exam


Neurological Exam: Alert, Awake, CN II-XII Intact, Normal Gait, Oriented x3





- Psychiatric Exam


Psychiatric exam: Normal Affect, Normal Mood





- Skin


Skin Exam: Dry, Intact, Normal Color, Warm





Assessment and Plan


(1) Scrotal edema


Status: Chronic





(2) DVT prophylaxis


Assessment & Plan: 


scd and ae hose, ambulation


Status: Acute





(3) Agitation


Assessment & Plan: 


seroquel/depakote


ativan/haldol prn


Status: Acute





(4) CAD (coronary artery disease)


Assessment & Plan: 


cont meds


Status: Acute





(5) Smoking


Assessment & Plan: 


nicoderm


Status: Chronic

## 2016-03-14 RX ADMIN — DIVALPROEX SODIUM SCH MG: 500 TABLET, DELAYED RELEASE ORAL at 16:33

## 2016-03-14 RX ADMIN — DIVALPROEX SODIUM SCH MG: 500 TABLET, DELAYED RELEASE ORAL at 10:29

## 2016-03-14 RX ADMIN — Medication SCH ML: at 10:31

## 2016-03-14 NOTE — CP.PCM.PN
Subjective





- Date & Time of Evaluation


Date of Evaluation: 03/14/16


Time of Evaluation: 07:15





- Subjective


Subjective: 


pt comfortable. no distress, no complaints.  no f/c, n/v/d. 


ros negative


pt calm and cooperative





Objective





- Vital Signs/Intake and Output


Vital Signs (last 24 hours): 


 











Temp Pulse Resp BP Pulse Ox


 


 97.9 F   93 H  19   145/85   98 


 


 03/14/16 00:00  03/14/16 00:00  03/14/16 00:00  03/14/16 00:00  03/14/16 00:00











- Medications


Medications: 


 Current Medications





Aspirin (Ecotrin)  81 mg PO DAILY [facility]


   Last Admin: 03/13/16 09:37 Dose:  81 mg


Divalproex Sodium (Depakote Dr(*Bid*))  500 mg PO BID [facility]


   Last Admin: 03/13/16 16:00 Dose:  500 mg


Enalapril Maleate (Vasotec)  10 mg PO DAILY [facility]


   Last Admin: 03/13/16 09:36 Dose:  10 mg


Famotidine (Pepcid)  20 mg PO BID [facility]


   Last Admin: 03/13/16 16:00 Dose:  20 mg


Haloperidol Lactate (Haldol)  5 mg IM Q6 PRN


   PRN Reason: Agitation


   Last Admin: 03/13/16 20:06 Dose:  5 mg


Ibuprofen (Motrin Tab)  600 mg PO Q8 PRN


   PRN Reason: Pain, moderate (4-7)


Levetiracetam (Keppra)  500 mg PO BID [facility]


   Last Admin: 03/13/16 16:00 Dose:  500 mg


Lorazepam (Ativan)  1 mg IVP Q4 PRN


   PRN Reason: Agitation


   Last Admin: 03/09/16 14:05 Dose:  1 mg


Metoprolol Tartrate (Lopressor)  50 mg PO Q12 [facility]


   Last Admin: 03/13/16 20:07 Dose:  50 mg


Multivitamins/Vitamin C (Multi-Delyn Liquid)  5 ml PO DAILY [facility]


   Last Admin: 03/13/16 09:37 Dose:  5 ml


Nicotine (Nicoderm Cq)  1 patch TD DAILY [facility]


   Last Admin: 03/13/16 09:38 Dose:  1 patch


Quetiapine Fumarate (Seroquel)  25 mg PO DAILY@1700 [facility]


   Last Admin: 03/13/16 15:59 Dose:  25 mg











- Labs


Labs: 


 





 02/10/16 16:05 





 02/10/16 16:05 





 











PT  11.2 SECONDS (9.4-12.0)   12/14/15  05:20    


 


INR  1.05  (0.89-1.20)   12/14/15  05:20    


 


APTT  36.2 SECONDS (23.1-32.0)  H  12/14/15  05:20    














- Constitutional


Appears: Well, Non-toxic, No Acute Distress





- Head Exam


Head Exam: ATRAUMATIC, NORMAL INSPECTION, NORMOCEPHALIC





- Eye Exam


Eye Exam: EOMI, Normal appearance, PERRL


Pupil Exam: NORMAL ACCOMODATION, PERRL





- ENT Exam


ENT Exam: Mucous Membranes Moist, Normal Exam





- Neck Exam


Neck Exam: Full ROM, Normal Inspection.  absent: Lymphadenopathy





- Respiratory Exam


Respiratory Exam: Clear to Ausculation Bilateral, NORMAL BREATHING PATTERN





- Cardiovascular Exam


Cardiovascular Exam: REGULAR RHYTHM, +S1, +S2.  absent: Murmur





- GI/Abdominal Exam


GI & Abdominal Exam: Soft, Normal Bowel Sounds.  absent: Tenderness





- Extremities Exam


Extremities Exam: Full ROM, Normal Capillary Refill, Normal Inspection.  absent

: Joint Swelling, Pedal Edema





- Back Exam


Back Exam: NORMAL INSPECTION





- Neurological Exam


Neurological Exam: Alert, Awake, CN II-XII Intact, Normal Gait, Oriented x3





- Psychiatric Exam


Psychiatric exam: Normal Affect, Normal Mood





- Skin


Skin Exam: Dry, Intact, Normal Color, Warm





Assessment and Plan


(1) Scrotal edema


Status: Chronic





(2) DVT prophylaxis


Assessment & Plan: 


scd and ae hose


ambulation


Status: Acute





(3) Agitation


Assessment & Plan: 


seroquel/depakote


ativan/haldol prn


Status: Acute





(4) CAD (coronary artery disease)


Assessment & Plan: 


cont meds


Status: Acute





(5) Smoking


Assessment & Plan: 


nicoderm


Status: Chronic

## 2016-03-15 RX ADMIN — Medication SCH ML: at 09:29

## 2016-03-15 RX ADMIN — DIVALPROEX SODIUM SCH MG: 500 TABLET, DELAYED RELEASE ORAL at 18:20

## 2016-03-15 RX ADMIN — DIVALPROEX SODIUM SCH MG: 500 TABLET, DELAYED RELEASE ORAL at 09:29

## 2016-03-15 NOTE — CP.PCM.PN
Subjective





- Date & Time of Evaluation


Date of Evaluation: 03/15/16


Time of Evaluation: 07:55





- Subjective


Subjective: 


pt omfortable in bed, calm and cooperative at present. pt has requiredsome prn 

doses of ativan/haldol for agitation as he continuesdto request to be let go. 

no physical complaints. no f/c, n/v/d


ros as stated





Objective





- Vital Signs/Intake and Output


Vital Signs (last 24 hours): 


 











Temp Pulse Resp BP Pulse Ox


 


 97.9 F   103 H  18   163/94 H  100 


 


 03/14/16 16:00  03/14/16 20:01  03/14/16 08:26  03/14/16 20:01  03/14/16 16:00











- Medications


Medications: 


 Current Medications





Aspirin (Ecotrin)  81 mg PO DAILY [facility]


   Last Admin: 03/14/16 10:30 Dose:  81 mg


Divalproex Sodium (Depakote Dr(*Bid*))  500 mg PO BID [facility]


   Last Admin: 03/14/16 16:33 Dose:  500 mg


Enalapril Maleate (Vasotec)  10 mg PO DAILY [facility]


   Last Admin: 03/14/16 10:31 Dose:  10 mg


Famotidine (Pepcid)  20 mg PO BID [facility]


   Last Admin: 03/14/16 16:33 Dose:  20 mg


Haloperidol Lactate (Haldol)  5 mg IM Q6 PRN


   PRN Reason: Agitation


   Last Admin: 03/14/16 20:01 Dose:  5 mg


Ibuprofen (Motrin Tab)  600 mg PO Q8 PRN


   PRN Reason: Pain, moderate (4-7)


Levetiracetam (Keppra)  500 mg PO BID [facility]


   Last Admin: 03/14/16 16:33 Dose:  500 mg


Lorazepam (Ativan)  1 mg IVP Q4 PRN


   PRN Reason: Agitation


   Last Admin: 03/14/16 12:57 Dose:  1 mg


Metoprolol Tartrate (Lopressor)  50 mg PO Q12 [facility]


   Last Admin: 03/14/16 20:01 Dose:  50 mg


Multivitamins/Vitamin C (Multi-Delyn Liquid)  5 ml PO DAILY [facility]


   Last Admin: 03/14/16 10:31 Dose:  5 ml


Nicotine (Nicoderm Cq)  1 patch TD DAILY [facility]


   Last Admin: 03/14/16 10:31 Dose:  1 patch


Quetiapine Fumarate (Seroquel)  25 mg PO DAILY@1700 [facility]


   Last Admin: 03/14/16 16:33 Dose:  25 mg











- Labs


Labs: 


 





 02/10/16 16:05 





 02/10/16 16:05 





 











PT  11.2 SECONDS (9.4-12.0)   12/14/15  05:20    


 


INR  1.05  (0.89-1.20)   12/14/15  05:20    


 


APTT  36.2 SECONDS (23.1-32.0)  H  12/14/15  05:20    














- Constitutional


Appears: Well, Non-toxic, No Acute Distress





- Head Exam


Head Exam: ATRAUMATIC, NORMAL INSPECTION, NORMOCEPHALIC





- Eye Exam


Eye Exam: EOMI, Normal appearance, PERRL


Pupil Exam: NORMAL ACCOMODATION, PERRL





- ENT Exam


ENT Exam: Mucous Membranes Moist, Normal Exam





- Neck Exam


Neck Exam: Full ROM, Normal Inspection.  absent: Lymphadenopathy





- Respiratory Exam


Respiratory Exam: Clear to Ausculation Bilateral, NORMAL BREATHING PATTERN





- Cardiovascular Exam


Cardiovascular Exam: REGULAR RHYTHM, +S1, +S2.  absent: Murmur





- GI/Abdominal Exam


GI & Abdominal Exam: Soft, Normal Bowel Sounds.  absent: Tenderness





- Extremities Exam


Extremities Exam: Full ROM, Normal Capillary Refill, Normal Inspection.  absent

: Joint Swelling, Pedal Edema





- Back Exam


Back Exam: NORMAL INSPECTION





- Neurological Exam


Neurological Exam: Alert, Awake, CN II-XII Intact, Normal Gait, Oriented x3





- Psychiatric Exam


Psychiatric exam: Normal Affect, Normal Mood





- Skin


Skin Exam: Dry, Intact, Normal Color, Warm





Assessment and Plan


(1) Scrotal edema


Status: Chronic





(2) DVT prophylaxis


Assessment & Plan: 


scd and ae hose ambulation


Status: Acute





(3) Agitation


Assessment & Plan: 


seroquel/depakote


ativan/haldol prn


Status: Acute





(4) CAD (coronary artery disease)


Assessment & Plan: 


cont meds


Status: Acute





(5) Smoking


Assessment & Plan: 


nicoderm


Status: Chronic








- Assessment and Plan (Free Text)


Assessment: 


pt remains pending ss for guardianship/placement

## 2016-03-16 RX ADMIN — DIVALPROEX SODIUM SCH MG: 500 TABLET, DELAYED RELEASE ORAL at 09:38

## 2016-03-16 RX ADMIN — DIVALPROEX SODIUM SCH MG: 500 TABLET, DELAYED RELEASE ORAL at 16:17

## 2016-03-16 RX ADMIN — Medication SCH ML: at 09:38

## 2016-03-16 NOTE — CP.PCM.PN
Subjective





- Date & Time of Evaluation


Date of Evaluation: 03/16/16


Time of Evaluation: 10:00





- Subjective


Subjective: 


remains afebrile


cont to improve 





Objective





- Vital Signs/Intake and Output


Vital Signs (last 24 hours): 


 











Temp Pulse Resp BP Pulse Ox


 


 97.3 F L  72   20   136/84   98 


 


 03/16/16 09:26  03/16/16 09:37  03/16/16 09:26  03/16/16 09:37  03/16/16 09:26











- Medications


Medications: 


 Current Medications





Aspirin (Ecotrin)  81 mg PO DAILY [facility]


   Last Admin: 03/16/16 09:38 Dose:  81 mg


Divalproex Sodium (Depakote Dr(*Bid*))  500 mg PO BID [facility]


   Last Admin: 03/16/16 16:17 Dose:  500 mg


Enalapril Maleate (Vasotec)  10 mg PO DAILY [facility]


   Last Admin: 03/16/16 09:37 Dose:  10 mg


Famotidine (Pepcid)  20 mg PO BID [facility]


   Last Admin: 03/16/16 16:17 Dose:  20 mg


Haloperidol Lactate (Haldol)  5 mg IM Q6 PRN


   PRN Reason: Agitation


   Last Admin: 03/14/16 20:01 Dose:  5 mg


Ibuprofen (Motrin Tab)  600 mg PO Q8 PRN


   PRN Reason: Pain, moderate (4-7)


Levetiracetam (Keppra)  500 mg PO BID [facility]


   Last Admin: 03/16/16 16:17 Dose:  500 mg


Lorazepam (Ativan)  1 mg IVP Q4 PRN


   PRN Reason: Agitation


   Last Admin: 03/14/16 12:57 Dose:  1 mg


Metoprolol Tartrate (Lopressor)  50 mg PO Q12 [facility]


   Last Admin: 03/16/16 09:37 Dose:  50 mg


Multivitamins/Vitamin C (Multi-Delyn Liquid)  5 ml PO DAILY [facility]


   Last Admin: 03/16/16 09:38 Dose:  5 ml


Nicotine (Nicoderm Cq)  1 patch TD DAILY [facility]


   Last Admin: 03/16/16 09:38 Dose:  1 patch


Quetiapine Fumarate (Seroquel)  25 mg PO DAILY@1700 [facility]


   Last Admin: 03/16/16 16:17 Dose:  25 mg











- Labs


Labs: 


 





 02/10/16 16:05 





 02/10/16 16:05 





 











PT  11.2 SECONDS (9.4-12.0)   12/14/15  05:20    


 


INR  1.05  (0.89-1.20)   12/14/15  05:20    


 


APTT  36.2 SECONDS (23.1-32.0)  H  12/14/15  05:20    














- Constitutional


Appears: Non-toxic, Chronically Ill





- Head Exam


Head Exam: NORMOCEPHALIC





- Eye Exam


Eye Exam: absent: Scleral icterus





- ENT Exam


ENT Exam: Mucous Membranes Dry





- Neck Exam


Neck Exam: absent: Lymphadenopathy





- Respiratory Exam


Respiratory Exam: Decreased Breath Sounds, Clear to Ausculation Bilateral





- Cardiovascular Exam


Cardiovascular Exam: REGULAR RHYTHM, +S1, +S2





- GI/Abdominal Exam


GI & Abdominal Exam: Distended, Soft.  absent: Tenderness





- Rectal Exam


Rectal Exam: Deferred





-  Exam


 Exam: NORMAL INSPECTION





- Extremities Exam


Extremities Exam: absent: Calf Tenderness, Pedal Edema





- Back Exam


Back Exam: absent: CVA tenderness (L), CVA tenderness (R)





Assessment and Plan


(1) Agitation


Status: Acute





(2) History of seizure


Status: Chronic





(3) NSTEMI (non-ST elevated myocardial infarction)


Status: Acute

## 2016-03-16 NOTE — CP.PCM.PN
Subjective





- Date & Time of Evaluation


Date of Evaluation: 03/16/16


Time of Evaluation: 07:22





- Subjective


Subjective: 


pt comfortable in bed,no distress, no f/c, n/v/d. no compalints


ros negative


ptcalm and cooperative





Objective





- Vital Signs/Intake and Output


Vital Signs (last 24 hours): 


 











Temp Pulse Resp BP Pulse Ox


 


 98.2 F   78   20   141/88   99 


 


 03/15/16 07:56  03/15/16 22:05  03/15/16 07:56  03/15/16 22:05  03/15/16 07:56











- Medications


Medications: 


 Current Medications





Aspirin (Ecotrin)  81 mg PO DAILY [facility]


   Last Admin: 03/15/16 09:29 Dose:  81 mg


Divalproex Sodium (Depakote Dr(*Bid*))  500 mg PO BID [facility]


   Last Admin: 03/15/16 18:20 Dose:  500 mg


Enalapril Maleate (Vasotec)  10 mg PO DAILY [facility]


   Last Admin: 03/15/16 09:29 Dose:  10 mg


Famotidine (Pepcid)  20 mg PO BID [facility]


   Last Admin: 03/15/16 18:20 Dose:  20 mg


Haloperidol Lactate (Haldol)  5 mg IM Q6 PRN


   PRN Reason: Agitation


   Last Admin: 03/14/16 20:01 Dose:  5 mg


Ibuprofen (Motrin Tab)  600 mg PO Q8 PRN


   PRN Reason: Pain, moderate (4-7)


Levetiracetam (Keppra)  500 mg PO BID [facility]


   Last Admin: 03/15/16 18:20 Dose:  500 mg


Lorazepam (Ativan)  1 mg IVP Q4 PRN


   PRN Reason: Agitation


   Last Admin: 03/14/16 12:57 Dose:  1 mg


Metoprolol Tartrate (Lopressor)  50 mg PO Q12 [facility]


   Last Admin: 03/15/16 22:05 Dose:  50 mg


Multivitamins/Vitamin C (Multi-Delyn Liquid)  5 ml PO DAILY [facility]


   Last Admin: 03/15/16 09:29 Dose:  5 ml


Nicotine (Nicoderm Cq)  1 patch TD DAILY [facility]


   Last Admin: 03/15/16 09:28 Dose:  1 patch


Quetiapine Fumarate (Seroquel)  25 mg PO DAILY@1700 [facility]


   Last Admin: 03/15/16 18:20 Dose:  Not Given











- Labs


Labs: 


 





 02/10/16 16:05 





 02/10/16 16:05 





 











PT  11.2 SECONDS (9.4-12.0)   12/14/15  05:20    


 


INR  1.05  (0.89-1.20)   12/14/15  05:20    


 


APTT  36.2 SECONDS (23.1-32.0)  H  12/14/15  05:20    














- Constitutional


Appears: Well, Non-toxic, No Acute Distress





- Head Exam


Head Exam: ATRAUMATIC, NORMAL INSPECTION, NORMOCEPHALIC





- Eye Exam


Eye Exam: EOMI, Normal appearance, PERRL


Pupil Exam: NORMAL ACCOMODATION, PERRL





- ENT Exam


ENT Exam: Mucous Membranes Moist, Normal Exam





- Neck Exam


Neck Exam: Full ROM, Normal Inspection.  absent: Lymphadenopathy





- Respiratory Exam


Respiratory Exam: Clear to Ausculation Bilateral, NORMAL BREATHING PATTERN





- Cardiovascular Exam


Cardiovascular Exam: REGULAR RHYTHM, +S1, +S2.  absent: Murmur





- GI/Abdominal Exam


GI & Abdominal Exam: Soft, Normal Bowel Sounds.  absent: Tenderness





- Extremities Exam


Extremities Exam: Full ROM, Normal Capillary Refill, Normal Inspection.  absent

: Joint Swelling, Pedal Edema





- Back Exam


Back Exam: NORMAL INSPECTION





- Neurological Exam


Neurological Exam: Alert, Awake, CN II-XII Intact, Normal Gait, Oriented x3





- Psychiatric Exam


Psychiatric exam: Normal Affect, Normal Mood





- Skin


Skin Exam: Dry, Intact, Normal Color, Warm





Assessment and Plan


(1) Scrotal edema


Status: Chronic





(2) DVT prophylaxis


Assessment & Plan: 


scd and ae hose


ambulation


Status: Acute





(3) Agitation


Assessment & Plan: 


seroqul/depakote


ativan/haldol prn


Status: Acute





(4) CAD (coronary artery disease)


Assessment & Plan: 


cont meds


Status: Acute





(5) Smoking


Assessment & Plan: 


nicoderm


Status: Chronic

## 2016-03-17 RX ADMIN — DIVALPROEX SODIUM SCH MG: 500 TABLET, DELAYED RELEASE ORAL at 08:27

## 2016-03-17 RX ADMIN — Medication SCH ML: at 08:29

## 2016-03-17 RX ADMIN — DIVALPROEX SODIUM SCH MG: 500 TABLET, DELAYED RELEASE ORAL at 16:03

## 2016-03-17 NOTE — CP.PCM.PN
Subjective





- Date & Time of Evaluation


Date of Evaluation: 03/17/16


Time of Evaluation: 08:06





- Subjective


Subjective: 


pt comfortable in bed, no comalitns offered. no f/c, n/v/d. c/o mild low back 

pain w/o radiation


ros negative except as mentioned





Objective





- Vital Signs/Intake and Output


Vital Signs (last 24 hours): 


 











Temp Pulse Resp BP Pulse Ox


 


 99.7 F H  83   20   128/81   98 


 


 03/16/16 18:02  03/16/16 20:59  03/16/16 18:02  03/16/16 20:59  03/16/16 18:02











- Medications


Medications: 


 Current Medications





Aspirin (Ecotrin)  81 mg PO DAILY [facility]


   Last Admin: 03/16/16 09:38 Dose:  81 mg


Divalproex Sodium (Depakote Dr(*Bid*))  500 mg PO BID [facility]


   Last Admin: 03/16/16 16:17 Dose:  500 mg


Enalapril Maleate (Vasotec)  10 mg PO DAILY [facility]


   Last Admin: 03/16/16 09:37 Dose:  10 mg


Famotidine (Pepcid)  20 mg PO BID [facility]


   Last Admin: 03/16/16 16:17 Dose:  20 mg


Haloperidol Lactate (Haldol)  5 mg IM Q6 PRN


   PRN Reason: Agitation


   Last Admin: 03/14/16 20:01 Dose:  5 mg


Ibuprofen (Motrin Tab)  600 mg PO Q8 PRN


   PRN Reason: Pain, moderate (4-7)


Levetiracetam (Keppra)  500 mg PO BID [facility]


   Last Admin: 03/16/16 16:17 Dose:  500 mg


Lorazepam (Ativan)  1 mg IVP Q4 PRN


   PRN Reason: Agitation


   Last Admin: 03/14/16 12:57 Dose:  1 mg


Metoprolol Tartrate (Lopressor)  50 mg PO Q12 [facility]


   Last Admin: 03/16/16 20:59 Dose:  50 mg


Multivitamins/Vitamin C (Multi-Delyn Liquid)  5 ml PO DAILY [facility]


   Last Admin: 03/16/16 09:38 Dose:  5 ml


Nicotine (Nicoderm Cq)  1 patch TD DAILY [facility]


   Last Admin: 03/16/16 09:38 Dose:  1 patch


Quetiapine Fumarate (Seroquel)  25 mg PO DAILY@1700 [facility]


   Last Admin: 03/16/16 16:17 Dose:  25 mg











- Labs


Labs: 


 





 02/10/16 16:05 





 02/10/16 16:05 





 











PT  11.2 SECONDS (9.4-12.0)   12/14/15  05:20    


 


INR  1.05  (0.89-1.20)   12/14/15  05:20    


 


APTT  36.2 SECONDS (23.1-32.0)  H  12/14/15  05:20    














- Constitutional


Appears: Well, Non-toxic, No Acute Distress





- Head Exam


Head Exam: ATRAUMATIC, NORMAL INSPECTION, NORMOCEPHALIC





- Eye Exam


Eye Exam: EOMI, Normal appearance, PERRL


Pupil Exam: NORMAL ACCOMODATION, PERRL





- ENT Exam


ENT Exam: Mucous Membranes Moist, Normal Exam





- Neck Exam


Neck Exam: Full ROM, Normal Inspection.  absent: Lymphadenopathy





- Respiratory Exam


Respiratory Exam: Clear to Ausculation Bilateral, NORMAL BREATHING PATTERN





- Cardiovascular Exam


Cardiovascular Exam: REGULAR RHYTHM, +S1, +S2.  absent: Murmur





- GI/Abdominal Exam


GI & Abdominal Exam: Soft, Normal Bowel Sounds.  absent: Tenderness





- Extremities Exam


Extremities Exam: Full ROM, Normal Capillary Refill, Normal Inspection.  absent

: Joint Swelling, Pedal Edema





- Back Exam


Back Exam: NORMAL INSPECTION





- Neurological Exam


Neurological Exam: Alert, Awake, CN II-XII Intact, Normal Gait, Oriented x3





- Psychiatric Exam


Psychiatric exam: Normal Affect, Normal Mood





- Skin


Skin Exam: Dry, Intact, Normal Color, Warm





Assessment and Plan


(1) Scrotal edema


Status: Chronic





(2) DVT prophylaxis


Assessment & Plan: 


scd jazmín e hose


ambulation


Status: Acute





(3) Agitation


Assessment & Plan: 


seroqeul/depakote


ativan/haldol prn


Status: Acute





(4) CAD (coronary artery disease)


Assessment & Plan: 


cotn meds


Status: Acute





(5) Smoking


Assessment & Plan: 


nicoderm


Status: Chronic





(6) Low back pain


Assessment & Plan: 


ibuprofen


Status: Acute

## 2016-03-18 RX ADMIN — DIVALPROEX SODIUM SCH MG: 500 TABLET, DELAYED RELEASE ORAL at 09:33

## 2016-03-18 RX ADMIN — Medication SCH ML: at 09:32

## 2016-03-18 RX ADMIN — DIVALPROEX SODIUM SCH MG: 500 TABLET, DELAYED RELEASE ORAL at 17:00

## 2016-03-18 NOTE — CP.PCM.PN
<Oscar Shearer - Last Filed: 03/18/16 12:02>





Subjective





- Date & Time of Evaluation


Date of Evaluation: 03/18/16


Time of Evaluation: 12:05





- Subjective


Subjective: 


I find pt in his room and he has eyes closed lying in bed. I greet him several 

times


before he opens his eyes and responds. He waves his hand as if he does not want 

to


talk anymore and turns away from writer to continue sleeping. Nurse reports 

that he needed


to be given Haldol for agitation yesterday





MSE


calm


uncooperative


decreased PMA


mood- "cannot asses"


affect is constricted


speech is under-productive


thought process cannot be assessed


thought content -poverty of content





medical decision making


-I am unable to find VPA level as Depakote level could be increased 


-Also night time Seroquel can be doubled if needed as well














Objective





- Vital Signs/Intake and Output


Vital Signs (last 24 hours): 


 











Temp Pulse Resp BP Pulse Ox


 


 96.8 F L  64   20   159/94 H  98 


 


 03/18/16 08:40  03/18/16 09:33  03/18/16 08:40  03/18/16 09:33  03/18/16 08:40











- Medications


Medications: 


 Current Medications





Aspirin (Ecotrin)  81 mg PO DAILY [facility]


   Last Admin: 03/18/16 09:33 Dose:  81 mg


Divalproex Sodium (Depakote Dr(*Bid*))  500 mg PO BID [facility]


   Last Admin: 03/18/16 09:33 Dose:  500 mg


Enalapril Maleate (Vasotec)  10 mg PO DAILY [facility]


   Last Admin: 03/18/16 09:34 Dose:  10 mg


Famotidine (Pepcid)  20 mg PO BID [facility]


   Last Admin: 03/18/16 09:32 Dose:  20 mg


Haloperidol Lactate (Haldol)  5 mg IM Q6 PRN


   PRN Reason: Agitation


   Last Admin: 03/17/16 23:48 Dose:  5 mg


Ibuprofen (Motrin Tab)  600 mg PO Q8 PRN


   PRN Reason: Pain, moderate (4-7)


Levetiracetam (Keppra)  500 mg PO BID [facility]


   Last Admin: 03/18/16 09:33 Dose:  500 mg


Lorazepam (Ativan)  1 mg IVP Q4 PRN


   PRN Reason: Agitation


   Last Admin: 03/14/16 12:57 Dose:  1 mg


Metoprolol Tartrate (Lopressor)  50 mg PO Q12 [facility]


   Last Admin: 03/18/16 09:33 Dose:  50 mg


Multivitamins/Vitamin C (Multi-Delyn Liquid)  5 ml PO DAILY [facility]


   Last Admin: 03/18/16 09:32 Dose:  5 ml


Nicotine (Nicoderm Cq)  1 patch TD DAILY [facility]


   Last Admin: 03/18/16 09:32 Dose:  1 patch


Quetiapine Fumarate (Seroquel)  25 mg PO DAILY@1700 [facility]


   Last Admin: 03/17/16 16:03 Dose:  25 mg











- Labs


Labs: 


 





 02/10/16 16:05 





 02/10/16 16:05 





 











PT  11.2 SECONDS (9.4-12.0)   12/14/15  05:20    


 


INR  1.05  (0.89-1.20)   12/14/15  05:20    


 


APTT  36.2 SECONDS (23.1-32.0)  H  12/14/15  05:20    














<Fan Borjas - Last Filed: 03/19/16 07:41>





Subjective





- Date & Time of Evaluation


Date of Evaluation: 03/18/16


Time of Evaluation: 10:26





- Subjective


Subjective: 


full consult dictated [id number]


pt awake/alert


spoke w/ pt 1500 w/ dr law, psychiatry and ss and pt having fluid rational 

thoughts.  


no complaints.








Objective





- Vital Signs/Intake and Output


Vital Signs (last 24 hours): 


 











Temp Pulse Resp BP Pulse Ox


 


 96.8 F L  64   20   159/94 H  98 


 


 03/18/16 08:40  03/18/16 09:33  03/18/16 08:40  03/18/16 09:33  03/18/16 08:40











- Medications


Medications: 


 Current Medications





Aspirin (Ecotrin)  81 mg PO DAILY [facility]


   Last Admin: 03/18/16 09:33 Dose:  81 mg


Divalproex Sodium (Depefe Bai(*Bid*))  500 mg PO BID [facility]


   Last Admin: 03/18/16 09:33 Dose:  500 mg


Enalapril Maleate (Vasotec)  10 mg PO DAILY [facility]


   Last Admin: 03/18/16 09:34 Dose:  10 mg


Famotidine (Pepcid)  20 mg PO BID [facility]


   Last Admin: 03/18/16 09:32 Dose:  20 mg


Haloperidol Lactate (Haldol)  5 mg IM Q6 PRN


   PRN Reason: Agitation


   Last Admin: 03/17/16 23:48 Dose:  5 mg


Ibuprofen (Motrin Tab)  600 mg PO Q8 PRN


   PRN Reason: Pain, moderate (4-7)


Levetiracetam (Keppra)  500 mg PO BID [facility]


   Last Admin: 03/18/16 09:33 Dose:  500 mg


Lorazepam (Ativan)  1 mg IVP Q4 PRN


   PRN Reason: Agitation


   Last Admin: 03/14/16 12:57 Dose:  1 mg


Metoprolol Tartrate (Lopressor)  50 mg PO Q12 [facility]


   Last Admin: 03/18/16 09:33 Dose:  50 mg


Multivitamins/Vitamin C (Multi-Delyn Liquid)  5 ml PO DAILY [facility]


   Last Admin: 03/18/16 09:32 Dose:  5 ml


Nicotine (Nicoderm Cq)  1 patch TD DAILY [facility]


   Last Admin: 03/18/16 09:32 Dose:  1 patch


Quetiapine Fumarate (Seroquel)  25 mg PO DAILY@1700 [facility]


   Last Admin: 03/17/16 16:03 Dose:  25 mg











- Labs


Labs: 


 





 02/10/16 16:05 





 02/10/16 16:05 





 











PT  11.2 SECONDS (9.4-12.0)   12/14/15  05:20    


 


INR  1.05  (0.89-1.20)   12/14/15  05:20    


 


APTT  36.2 SECONDS (23.1-32.0)  H  12/14/15  05:20    














Assessment and Plan


(1) Scrotal edema


Status: Chronic





(2) DVT prophylaxis


Status: Acute





(3) Agitation


Status: Acute





(4) CAD (coronary artery disease)


Status: Acute





(5) Smoking


Status: Chronic





(6) Low back pain


Status: Acute

## 2016-03-18 NOTE — PN
DATE: 03/18/2016



The patient evaluated in bed, no complaints offered.  No fever, chills, nausea, vomiting, diarrhea.  
The patient remains pending guardianship.   is working on that.  The patient no longer
 complains of back pain.



REVIEW OF SYSTEMS:  Negative.



PHYSICAL EXAMINATION:

GENERAL:  The patient is alert and oriented, no complaints offered.

HEART:  Regular rate and rhythm.  No murmurs, rubs or gallops.

LUNGS:  Clear in all fields.  No respiratory distress noted.

ABDOMEN:  Soft, nontender.  Bowel sounds x 4.

EXTREMITIES:  Distal pulses, motor sensation intact.  Negative Homans sign.  No weakness or numbness.
  The patient ambulatory with steady gait.



DIAGNOSES:  Coronary artery disease, agitation, smoking, deep venous thrombosis prophylaxis.



We will continue all medications.  Seroquel and Depakote for agitation, Ativan and Haldol as needed. 
 We will continue to work with  to get patient guardianship for disposition.





__________________________________________

Fan SILVER







cc:



DD: 03/18/2016 13:39:32  [id number]

TT: 03/18/2016 13:55:08

Confirmation # [id number]

Dictation # [id number]

en

## 2016-03-18 NOTE — PCM.PYCHPN
Psychiatric Progress Note





- Psychiatric Progress Note


Patient seen today, lenth of contact: Case discussed briefly with NP on the 

floor


Patient Chief Complaint: 





Nurse reports that he was agitated and aggressive yesterday requiring Haldol. 

She also reports that he has been wandering. When I enter the room he appears 

to be sleeping. When I call his name he opens his eyes and nods his head. He 

responds but then shakes his head as if to say he does not want to talk now. He 

turns away from writer, repositions his body and closes his eyes.  





MSE


calm


uncooperative


brief eye contact


decreased PMA


mood- cannot assess


affect is constricted


speech is underproductive


thought process cannot be assessed


thought content - poverty of content














Medication Change: No


Medical Record Reviewed: Yes





Mental Status Examination





- Cognitive Function


Orientation: Person


Memory: Impaired


Attention: Poor


Concentration: Poor


Association: Loose


Fund of Knowledge: Poor





- Mood


Mood: Neutral





- Affect


Affect: Blunted





- Speech


Speech: Soft





- Formal Thought Process


Formal Thought Process: Loosening of associations





- Suicidal Ideation


Suicidal Ideation: No





- Homicidal Ideation


Homicidal Ideation: No





Goal/Treatment Plan





- Goal/Treatment Plan


Need for Continued Stay: Remain at risks for inpatient hospitalization


Progress Toward Problem(s) and Goals/Treatment Plan: 





Medical decision making


-consider increasing Depakote based on VPA level


-obtain level and if less than 125, night time dose can be increased


-Seroquel can also be increased to 50 mg at bedtime

## 2016-03-19 LAB
BUN SERPL-MCNC: 27 MG/DL (ref 9–20)
CALCIUM SERPL-MCNC: 9 MG/DL (ref 8.4–10.2)
ERYTHROCYTE [DISTWIDTH] IN BLOOD BY AUTOMATED COUNT: 12.9 % (ref 11.5–14.5)
GFR NON-AFRICAN AMERICAN: > 60
HGB BLD-MCNC: 11.8 G/DL (ref 12–18)
MCH RBC QN AUTO: 30.9 PG (ref 27–31)
MCHC RBC AUTO-ENTMCNC: 32.7 G/DL (ref 33–37)
MCV RBC AUTO: 94.5 FL (ref 80–94)
PLATELET # BLD: 231 K/UL (ref 130–400)
RBC # BLD AUTO: 3.83 MIL/UL (ref 4.4–5.9)
WBC # BLD AUTO: 6.7 K/UL (ref 4.8–10.8)

## 2016-03-19 RX ADMIN — DIVALPROEX SODIUM SCH MG: 500 TABLET, DELAYED RELEASE ORAL at 08:50

## 2016-03-19 RX ADMIN — DIVALPROEX SODIUM SCH MG: 500 TABLET, DELAYED RELEASE ORAL at 16:17

## 2016-03-19 NOTE — CP.PCM.PN
Subjective





- Date & Time of Evaluation


Date of Evaluation: 03/19/16


Time of Evaluation: 10:44





- Subjective


Subjective: 


no complaints offered. no distress. calm and cooperative. no f/c, n/v/d.


ros negative





Objective





- Vital Signs/Intake and Output


Vital Signs (last 24 hours): 


 











Temp Pulse Resp BP Pulse Ox


 


 97.2 F L  82   20   127/98 H  99 


 


 03/19/16 08:20  03/19/16 08:20  03/19/16 08:20  03/19/16 08:20  03/19/16 08:20











- Medications


Medications: 


 Current Medications





Aspirin (Ecotrin)  81 mg PO DAILY [facility]


   Last Admin: 03/19/16 08:50 Dose:  81 mg


Divalproex Sodium (Depakote Dr(*Bid*))  500 mg PO BID [facility]


   Last Admin: 03/19/16 08:50 Dose:  500 mg


Famotidine (Pepcid)  20 mg PO BID [facility]


   Last Admin: 03/19/16 08:52 Dose:  20 mg


Haloperidol Lactate (Haldol)  5 mg IM Q6 PRN


   PRN Reason: Agitation


   Last Admin: 03/18/16 21:09 Dose:  5 mg


Ibuprofen (Motrin Tab)  600 mg PO Q8 PRN


   PRN Reason: Pain, moderate (4-7)


Levetiracetam (Keppra)  500 mg PO BID [facility]


   Last Admin: 03/19/16 08:51 Dose:  500 mg


Lorazepam (Ativan)  1 mg IVP Q4 PRN


   PRN Reason: Agitation


   Last Admin: 03/14/16 12:57 Dose:  1 mg


Metoprolol Tartrate (Lopressor)  50 mg PO Q12 [facility]


   Last Admin: 03/19/16 08:51 Dose:  50 mg


Nicotine (Nicoderm Cq)  1 patch TD DAILY [facility]


   Last Admin: 03/19/16 08:52 Dose:  1 patch


Quetiapine Fumarate (Seroquel)  25 mg PO DAILY@1700 [facility]


   Last Admin: 03/18/16 17:01 Dose:  25 mg











- Labs


Labs: 


 





 03/19/16 05:30 





 03/19/16 05:30 





 











PT  11.2 SECONDS (9.4-12.0)   12/14/15  05:20    


 


INR  1.05  (0.89-1.20)   12/14/15  05:20    


 


APTT  36.2 SECONDS (23.1-32.0)  H  12/14/15  05:20    














- Constitutional


Appears: Well, Non-toxic, No Acute Distress





- Head Exam


Head Exam: ATRAUMATIC, NORMAL INSPECTION, NORMOCEPHALIC





- Eye Exam


Eye Exam: EOMI, Normal appearance, PERRL


Pupil Exam: NORMAL ACCOMODATION, PERRL





- ENT Exam


ENT Exam: Mucous Membranes Moist, Normal Exam





- Neck Exam


Neck Exam: Full ROM, Normal Inspection.  absent: Lymphadenopathy





- Respiratory Exam


Respiratory Exam: Clear to Ausculation Bilateral, NORMAL BREATHING PATTERN





- Cardiovascular Exam


Cardiovascular Exam: REGULAR RHYTHM, +S1, +S2.  absent: Murmur





- GI/Abdominal Exam


GI & Abdominal Exam: Soft, Normal Bowel Sounds.  absent: Tenderness





- Extremities Exam


Extremities Exam: Full ROM, Normal Capillary Refill, Normal Inspection.  absent

: Joint Swelling, Pedal Edema





- Back Exam


Back Exam: NORMAL INSPECTION





- Neurological Exam


Neurological Exam: Alert, Awake, CN II-XII Intact, Normal Gait, Oriented x3





- Psychiatric Exam


Psychiatric exam: Normal Affect, Normal Mood





- Skin


Skin Exam: Dry, Intact, Normal Color, Warm





Assessment and Plan


(1) Scrotal edema


Status: Chronic





(2) DVT prophylaxis


Assessment & Plan: 


scd and aehose


ambulation


Status: Acute





(3) Agitation


Assessment & Plan: 


eroquel/depakote


ativan/haldol prn


Status: Acute





(4) CAD (coronary artery disease)


Assessment & Plan: 


cont meds


Status: Acute





(5) Smoking


Assessment & Plan: 


nicoderm


Status: Chronic





(6) Low back pain


Assessment & Plan: 


no complaints


ibuprofen prn


Status: Acute

## 2016-03-20 RX ADMIN — DIVALPROEX SODIUM SCH MG: 500 TABLET, DELAYED RELEASE ORAL at 17:12

## 2016-03-20 RX ADMIN — DIVALPROEX SODIUM SCH MG: 500 TABLET, DELAYED RELEASE ORAL at 08:06

## 2016-03-20 NOTE — CP.PCM.PN
Subjective





- Date & Time of Evaluation


Date of Evaluation: 03/20/16


Time of Evaluation: 17:34





- Subjective


Subjective: 


pt comfortable in chair, full conversation in fluid, rational manner.  pt 

states that he is looking forward to going home tomorrow.


ros negative


no f/c, n/v/d. 





Objective





- Vital Signs/Intake and Output


Vital Signs (last 24 hours): 


 











Temp Pulse Resp BP Pulse Ox


 


 96.4 F L  66   20   120/76   100 


 


 03/20/16 08:16  03/20/16 08:16  03/20/16 08:16  03/20/16 08:16  03/20/16 08:16











- Medications


Medications: 


 Current Medications





Aspirin (Ecotrin)  81 mg PO DAILY [facility]


   Last Admin: 03/20/16 08:06 Dose:  81 mg


Divalproex Sodium (Depakote Dr(*Bid*))  500 mg PO BID [facility]


   Last Admin: 03/20/16 17:12 Dose:  500 mg


Enalapril Maleate (Vasotec)  10 mg PO DAILY [facility]


   Last Admin: 03/20/16 08:07 Dose:  10 mg


Famotidine (Pepcid)  20 mg PO BID [facility]


   Last Admin: 03/20/16 17:13 Dose:  20 mg


Haloperidol Lactate (Haldol)  5 mg IM Q6 PRN


   PRN Reason: Agitation


   Last Admin: 03/19/16 21:28 Dose:  5 mg


Ibuprofen (Motrin Tab)  600 mg PO Q8 PRN


   PRN Reason: Pain, moderate (4-7)


Levetiracetam (Keppra)  500 mg PO BID [facility]


   Last Admin: 03/20/16 17:12 Dose:  500 mg


Lorazepam (Ativan)  1 mg IVP Q4 PRN


   PRN Reason: Agitation


   Last Admin: 03/14/16 12:57 Dose:  1 mg


Metoprolol Tartrate (Lopressor)  50 mg PO Q12 [facility]


   Last Admin: 03/20/16 08:06 Dose:  50 mg


Nicotine (Nicoderm Cq)  1 patch TD DAILY [facility]


   Last Admin: 03/20/16 08:07 Dose:  1 patch


Quetiapine Fumarate (Seroquel)  25 mg PO DAILY@1700 [facility]


   Last Admin: 03/20/16 17:13 Dose:  25 mg











- Labs


Labs: 


 





 03/19/16 05:30 





 03/19/16 05:30 





 











PT  11.2 SECONDS (9.4-12.0)   12/14/15  05:20    


 


INR  1.05  (0.89-1.20)   12/14/15  05:20    


 


APTT  36.2 SECONDS (23.1-32.0)  H  12/14/15  05:20    














- Constitutional


Appears: Well, Non-toxic, No Acute Distress





- Head Exam


Head Exam: ATRAUMATIC, NORMAL INSPECTION, NORMOCEPHALIC





- Eye Exam


Eye Exam: EOMI, Normal appearance, PERRL


Pupil Exam: NORMAL ACCOMODATION, PERRL





- ENT Exam


ENT Exam: Mucous Membranes Moist, Normal Exam





- Neck Exam


Neck Exam: Full ROM, Normal Inspection.  absent: Lymphadenopathy





- Respiratory Exam


Respiratory Exam: Clear to Ausculation Bilateral, NORMAL BREATHING PATTERN





- Cardiovascular Exam


Cardiovascular Exam: REGULAR RHYTHM, +S1, +S2.  absent: Murmur





- GI/Abdominal Exam


GI & Abdominal Exam: Soft, Normal Bowel Sounds.  absent: Tenderness





- Rectal Exam


Rectal Exam: NORMAL INSPECTION





- Extremities Exam


Extremities Exam: Full ROM, Normal Capillary Refill, Normal Inspection.  absent

: Joint Swelling, Pedal Edema





- Back Exam


Back Exam: NORMAL INSPECTION





- Neurological Exam


Neurological Exam: Alert, Awake, CN II-XII Intact, Normal Gait, Oriented x3





- Psychiatric Exam


Psychiatric exam: Normal Affect, Normal Mood





- Skin


Skin Exam: Dry, Intact, Normal Color, Warm





Assessment and Plan


(1) Scrotal edema


Status: Chronic





(2) DVT prophylaxis


Assessment & Plan: 


scd and ae hose


ambulation


Status: Acute





(3) Agitation


Assessment & Plan: 


seroquel/depakote


ativan/haldol prn


Status: Acute





(4) CAD (coronary artery disease)


Assessment & Plan: 


cont all meds


Status: Acute





(5) Smoking


Assessment & Plan: 


nicoderm


Status: Chronic





(6) Low back pain


Assessment & Plan: 


ibuprofen prn. 


has not requested meds or c/o pain


Status: Acute

## 2016-03-21 RX ADMIN — DIVALPROEX SODIUM SCH MG: 500 TABLET, DELAYED RELEASE ORAL at 09:03

## 2016-03-21 RX ADMIN — DIVALPROEX SODIUM SCH MG: 500 TABLET, DELAYED RELEASE ORAL at 18:00

## 2016-03-21 NOTE — CP.PCM.PN
Subjective





- Date & Time of Evaluation


Date of Evaluation: 03/21/16


Time of Evaluation: 07:19





- Subjective


Subjective: 


pt remains calm in bed. denies complaitns. no f/c, n/v/d


ros negative





Objective





- Vital Signs/Intake and Output


Vital Signs (last 24 hours): 


 











Temp Pulse Resp BP Pulse Ox


 


 96.4 F L  110 H  20   160/99 H  100 


 


 03/20/16 08:16  03/20/16 20:12  03/20/16 08:16  03/20/16 20:12  03/20/16 08:16











- Medications


Medications: 


 Current Medications





Aspirin (Ecotrin)  81 mg PO DAILY [facility]


   Last Admin: 03/20/16 08:06 Dose:  81 mg


Divalproex Sodium (Depakote Dr(*Bid*))  500 mg PO BID [facility]


   Last Admin: 03/20/16 17:12 Dose:  500 mg


Enalapril Maleate (Vasotec)  10 mg PO DAILY [facility]


   Last Admin: 03/20/16 08:07 Dose:  10 mg


Famotidine (Pepcid)  20 mg PO BID [facility]


   Last Admin: 03/20/16 17:13 Dose:  20 mg


Haloperidol Lactate (Haldol)  5 mg IM Q6 PRN


   PRN Reason: Agitation


   Last Admin: 03/20/16 19:50 Dose:  5 mg


Ibuprofen (Motrin Tab)  600 mg PO Q8 PRN


   PRN Reason: Pain, moderate (4-7)


Levetiracetam (Keppra)  500 mg PO BID [facility]


   Last Admin: 03/20/16 17:12 Dose:  500 mg


Lorazepam (Ativan)  1 mg IVP Q4 PRN


   PRN Reason: Agitation


   Last Admin: 03/14/16 12:57 Dose:  1 mg


Metoprolol Tartrate (Lopressor)  50 mg PO Q12 [facility]


   Last Admin: 03/20/16 20:12 Dose:  50 mg


Nicotine (Nicoderm Cq)  1 patch TD DAILY [facility]


   Last Admin: 03/20/16 08:07 Dose:  1 patch


Quetiapine Fumarate (Seroquel)  25 mg PO DAILY@1700 [facility]


   Last Admin: 03/20/16 17:13 Dose:  25 mg











- Labs


Labs: 


 





 03/19/16 05:30 





 03/19/16 05:30 





 











PT  11.2 SECONDS (9.4-12.0)   12/14/15  05:20    


 


INR  1.05  (0.89-1.20)   12/14/15  05:20    


 


APTT  36.2 SECONDS (23.1-32.0)  H  12/14/15  05:20    














- Constitutional


Appears: Well, Non-toxic, No Acute Distress





- Head Exam


Head Exam: ATRAUMATIC, NORMAL INSPECTION, NORMOCEPHALIC





- Eye Exam


Eye Exam: EOMI, Normal appearance, PERRL


Pupil Exam: NORMAL ACCOMODATION, PERRL





- ENT Exam


ENT Exam: Mucous Membranes Moist, Normal Exam





- Neck Exam


Neck Exam: Full ROM, Normal Inspection.  absent: Lymphadenopathy





- Respiratory Exam


Respiratory Exam: Clear to Ausculation Bilateral, NORMAL BREATHING PATTERN





- Cardiovascular Exam


Cardiovascular Exam: REGULAR RHYTHM, +S1, +S2.  absent: Murmur





- GI/Abdominal Exam


GI & Abdominal Exam: Soft, Normal Bowel Sounds.  absent: Tenderness





- Extremities Exam


Extremities Exam: Full ROM, Normal Capillary Refill, Normal Inspection.  absent

: Joint Swelling, Pedal Edema





- Back Exam


Back Exam: NORMAL INSPECTION





- Neurological Exam


Neurological Exam: Alert, Awake, CN II-XII Intact, Normal Gait, Oriented x3





- Psychiatric Exam


Psychiatric exam: Normal Affect, Normal Mood





- Skin


Skin Exam: Dry, Intact, Normal Color, Warm





Assessment and Plan


(1) Scrotal edema


Assessment & Plan: 


improving


Status: Chronic





(2) DVT prophylaxis


Assessment & Plan: 


scd and ae hsoe


ambulation


Status: Acute





(3) Agitation


Assessment & Plan: 


seroquel/depakote


ativan/haldol


Status: Acute





(4) CAD (coronary artery disease)


Assessment & Plan: 


cont meds


Status: Acute





(5) Smoking


Assessment & Plan: 


nicoderm


Status: Chronic





(6) Low back pain


Assessment & Plan: 


motrin prn


no complaints of pain


Status: Acute

## 2016-03-22 RX ADMIN — DIVALPROEX SODIUM SCH MG: 500 TABLET, DELAYED RELEASE ORAL at 16:52

## 2016-03-22 RX ADMIN — DIVALPROEX SODIUM SCH MG: 500 TABLET, DELAYED RELEASE ORAL at 09:51

## 2016-03-22 NOTE — PCM.PYCHPN
Psychiatric Progress Note





- Psychiatric Progress Note


Patient seen today, lenth of contact: Case discussed in detail with social work 

staff


Patient Chief Complaint: 





I was called by , Ms. Parker, to further discuss this case. She 

explains that her dialogue with pt was not as fruitful as with Dr. Walker and 

again


the concern arises - does Mr. Carpenter have the capacity to make the decision to 

leave the hospital. We immediately meet with him joined by another 


representative from social work who also serves as an . Mr. Carpneter 

believes he did not make his bed and was brought to the hospital. He will


leave hospital to go to his mother's house or he will find other family 

members. He cannot offer any other alternative should he not find his family . 

He insists


that he will find them and is unable to present alternate plan should this not 

be successful no matter how we ask him. 








MSE


calm


cooperative


good eye contact


decreased PMA


mood- good


affect is constricted


speech is productive


thought process - generally goal directed


thought content - illogical


memory is severely impaired


























Medication Change: No


Medical Record Reviewed: Yes





Mental Status Examination





- Cognitive Function


Orientation: Person


Memory: Impaired


Attention: Poor


Concentration: Poor


Association: Loose


Fund of Knowledge: Poor





- Mood


Mood: Neutral





- Affect


Affect: Constricted





- Speech


Speech: Soft





- Formal Thought Process


Formal Thought Process: No Impairment





- Suicidal Ideation


Suicidal Ideation: No





- Homicidal Ideation


Homicidal Ideation: No





Goal/Treatment Plan





- Goal/Treatment Plan


Need for Continued Stay: Remain at risks for inpatient hospitalization


Progress Toward Problem(s) and Goals/Treatment Plan: 


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





Medical decision making


-currently patient cannot fulfill - the criteria fro capacity- 


  --he cannot present the choices upon discharge


Mr. Carpenter has the right to live on the streets, go to a shelter, panhandle or 

refuse to take medication- he just has to able to verbalize this choice


 and the risks and benefits of his decisions, which he is unable to do


-also given the length of time that he has been impaired, if he is able to do 

this, it will need to be consistent and cannot


 be on one or two occasions. I suggest that social work continue with the 

guardianship as I do not anticipate pt's mental


 status will change.

## 2016-03-22 NOTE — CP.PCM.PN
Subjective





- Date & Time of Evaluation


Date of Evaluation: 03/22/16


Time of Evaluation: 14:00





- Subjective


Subjective: 


Patient is noted to occasionally wander in the unit. confused at times but 

cooperative. Denies any chest pain, no SOB





Objective





- Vital Signs/Intake and Output


Vital Signs (last 24 hours): 


 











Temp Pulse Resp BP Pulse Ox


 


 98.1 F   82   20   146/92 H  100 


 


 03/22/16 17:20  03/22/16 17:20  03/22/16 08:28  03/22/16 17:20  03/22/16 17:20











- Medications


Medications: 


 Current Medications





Aspirin (Ecotrin)  [facility] mg [facility] DAILY [facility]


   Last Admin: 03/22/16 09:51 Dose:  81 mg


Divalproex Sodium (Depakote Dr(*Bid*))  500 mg PO BID [facility]


   Last Admin: 03/22/16 16:52 Dose:  500 mg


Enalapril Maleate (Vasotec)  10 mg PO DAILY [facility]


   Last Admin: 03/22/16 09:51 Dose:  10 mg


Famotidine (Pepcid)  20 mg PO BID [facility]


   Last Admin: 03/22/16 16:51 Dose:  20 mg


Ibuprofen (Motrin Tab)  600 mg PO Q8 PRN


   PRN Reason: Pain, moderate (4-7)


Levetiracetam (Keppra)  500 mg PO BID [facility]


   Last Admin: 03/22/16 16:52 Dose:  500 mg


Lorazepam (Ativan)  1 mg IVP Q4 PRN


   PRN Reason: Agitation


   Last Admin: 03/14/16 12:57 Dose:  1 mg


Metoprolol Tartrate (Lopressor)  50 mg PO Q12 [facility]


   Last Admin: 03/22/16 09:51 Dose:  50 mg


Nicotine (Nicoderm Cq)  1 patch TD DAILY [facility]


   Last Admin: 03/22/16 09:53 Dose:  1 patch


Quetiapine Fumarate (Seroquel)  25 mg PO DAILY@1700 [facility]


   Last Admin: 03/22/16 16:52 Dose:  25 mg











- Labs


Labs: 


 





 03/19/16 05:30 





 03/19/16 05:30 





 











PT  11.2 SECONDS (9.4-12.0)   12/14/15  05:20    


 


INR  1.05  (0.89-1.20)   12/14/15  05:20    


 


APTT  36.2 SECONDS (23.1-32.0)  H  12/14/15  05:20    














- Constitutional


Appears: Well, Non-toxic, No Acute Distress





- Head Exam


Head Exam: ATRAUMATIC, NORMAL INSPECTION, NORMOCEPHALIC





- Respiratory Exam


Respiratory Exam: Clear to Ausculation Bilateral, NORMAL BREATHING PATTERN.  

absent: Rales, Rhonchi, Wheezes





- Cardiovascular Exam


Cardiovascular Exam: REGULAR RHYTHM, RRR, +S1, +S2





- GI/Abdominal Exam


GI & Abdominal Exam: Soft, Normal Bowel Sounds





- Neurological Exam


Neurological Exam: Alert, Awake, CN II-XII Intact, Normal Gait





- Psychiatric Exam


Psychiatric exam: Flat Affect, Normal Mood





Assessment and Plan


(1) CAD (coronary artery disease)


Status: Acute





(2) Cardiac arrhythmia


Status: Acute





(3) HTN (hypertension)


Status: Acute





(4) DVT prophylaxis


Status: Acute








- Assessment and Plan (Free Text)


Assessment: 


56 y M homeless, brought in after having a cardiac arrest in [place].  





1. Cognitive Impairment with agitation


- on Seroquel, Depakote and Ativan 


- patient was evaluated in detail with Psychiatry 


- patient awaiting guardianship and placement





2. Cardiac Arrest


- doing well, s/p extensive ICU admission


- s/p cardiology input





3.  HTN


- controlled with Metoprolol and Enalapril





4. Tobacco Abuse


- on Nicotine patch





5. CAD 


- on ASA, BB, ACEi


- add statin


- as noted per cardiology to have had a NSTEMI (with echo findings and elevated 

trop) as possible etiology of Cardiac arrest


- Echo - 12/2015 - LVEF~ , systolic funtion is moderatly impaired mild septal 

hypokinesis, borderline LV conc hypertrophy





6. DVT prophylaxis 


- ambulating in unit


- SCDs prn

## 2016-03-23 RX ADMIN — DIVALPROEX SODIUM SCH MG: 500 TABLET, DELAYED RELEASE ORAL at 16:41

## 2016-03-23 RX ADMIN — DIVALPROEX SODIUM SCH MG: 500 TABLET, DELAYED RELEASE ORAL at 09:30

## 2016-03-23 NOTE — CP.PCM.PN
Subjective





- Date & Time of Evaluation


Date of Evaluation: 03/23/16


Time of Evaluation: 15:00





- Subjective


Subjective: 


Patient seen bedside. Sitting in chair in NAD, pleasant , hemodynamiaclly stable

, afebrile. No acute issues overnight.





Objective





- Vital Signs/Intake and Output


Vital Signs (last 24 hours): 


 











Temp Pulse Resp BP Pulse Ox


 


 97.8 F   77   20   146/94 H  100 


 


 03/23/16 07:46  03/23/16 09:31  03/23/16 07:46  03/23/16 09:31  03/23/16 07:46











- Medications


Medications: 


 Current Medications





Aspirin (Ecotrin)  81 mg PO DAILY [facility]


   Last Admin: 03/23/16 09:30 Dose:  81 mg


Atorvastatin Calcium (Lipitor)  40 mg PO DAILY [facility]


   Last Admin: 03/23/16 09:30 Dose:  40 mg


Divalproex Sodium (Depakote Dr(*Bid*))  500 mg PO BID [facility]


   Last Admin: 03/23/16 09:30 Dose:  500 mg


Enalapril Maleate (Vasotec)  10 mg PO DAILY [facility]


   Last Admin: 03/23/16 09:32 Dose:  10 mg


Famotidine (Pepcid)  20 mg PO BID [facility]


   Last Admin: 03/23/16 09:31 Dose:  20 mg


Ibuprofen (Motrin Tab)  600 mg PO Q8 PRN


   PRN Reason: Pain, moderate (4-7)


Levetiracetam (Keppra)  500 mg PO BID [facility]


   Last Admin: 03/23/16 09:30 Dose:  500 mg


Lorazepam (Ativan)  1 mg IM Q4 PRN


   PRN Reason: Agitation


Metoprolol Tartrate (Lopressor)  50 mg PO Q12 [facility]


   Last Admin: 03/23/16 09:31 Dose:  50 mg


Nicotine (Nicoderm Cq)  1 patch TD DAILY [facility]


   Last Admin: 03/23/16 09:33 Dose:  1 patch


Quetiapine Fumarate (Seroquel)  25 mg PO DAILY@1700 [facility]


   Last Admin: 03/22/16 16:52 Dose:  25 mg











- Labs


Labs: 


 





 03/19/16 05:30 





 03/19/16 05:30 





 











PT  11.2 SECONDS (9.4-12.0)   12/14/15  05:20    


 


INR  1.05  (0.89-1.20)   12/14/15  05:20    


 


APTT  36.2 SECONDS (23.1-32.0)  H  12/14/15  05:20    














- Constitutional


Appears: Non-toxic, No Acute Distress





- Head Exam


Head Exam: ATRAUMATIC, NORMOCEPHALIC





- Eye Exam


Eye Exam: EOMI, Normal appearance, PERRL


Pupil Exam: NORMAL ACCOMODATION





- ENT Exam


ENT Exam: Mucous Membranes Moist, Normal Exam





- Neck Exam


Neck Exam: Full ROM, Normal Inspection





- Respiratory Exam


Respiratory Exam: Clear to Ausculation Bilateral, NORMAL BREATHING PATTERN.  

absent: Rhonchi, Wheezes





- Cardiovascular Exam


Cardiovascular Exam: REGULAR RHYTHM, RRR, +S1, +S2.  absent: JVD





- GI/Abdominal Exam


GI & Abdominal Exam: Soft, Normal Bowel Sounds.  absent: Tenderness, Rebound





- Rectal Exam


Rectal Exam: Deferred





- Extremities Exam


Extremities Exam: Full ROM, Normal Capillary Refill, Normal Inspection





- Neurological Exam


Neurological Exam: Alert, Awake, CN II-XII Intact, Oriented x3





- Psychiatric Exam


Psychiatric exam: Normal Affect





- Skin


Skin Exam: Dry, Normal Color, Warm





Assessment and Plan





- Assessment and Plan (Free Text)


Assessment: 


56 y M homeless, brought in after having a cardiac arrest in [place]. At present 

patient is medically stable but has cognitive impairment  with moments of 

agitation which makes his discharge unsafe.At present waiting on guardianship.





1. Cognitive Impairment with agitation


- on Seroquel, Depakote and Ativan 


- patient was evaluated in detail with Psychiatry 


- patient awaiting guardianship and placement





2. Cardiac Arrest


- doing well, s/p extensive ICU admission


- s/p cardiology input





3.  HTN


- controlled with Metoprolol and Enalapril





4. Tobacco Abuse


- on Nicotine patch





5. CAD 


- on ASA, BB, ACEi


- add statin


- as noted per cardiology to have had a NSTEMI (with echo findings and elevated 

trop) as possible etiology of Cardiac arrest


- Echo - 12/2015 - LVEF~ , systolic funtion is moderatly impaired mild septal 

hypokinesis, borderline LV conc hypertrophy





6. DVT prophylaxis 


- ambulating in unit


- SCDs prn

## 2016-03-24 RX ADMIN — DIVALPROEX SODIUM SCH MG: 500 TABLET, DELAYED RELEASE ORAL at 16:17

## 2016-03-24 RX ADMIN — DIVALPROEX SODIUM SCH MG: 500 TABLET, DELAYED RELEASE ORAL at 09:04

## 2016-03-24 NOTE — CP.PCM.PN
Subjective





- Date & Time of Evaluation


Date of Evaluation: 03/24/16


Time of Evaluation: 10:00





- Subjective


Subjective: 


Hemodynamically stable, afebrile. Denies any pain.Ambulating in unit. Awake and

  alert.


No acute issues obernight.





Objective





- Vital Signs/Intake and Output


Vital Signs (last 24 hours): 


 











Temp Pulse Resp BP Pulse Ox


 


 97.9 F   61   20   142/76   100 


 


 03/24/16 08:21  03/24/16 09:05  03/24/16 08:21  03/24/16 09:05  03/24/16 08:21











- Medications


Medications: 


 Current Medications





Aspirin (Ecotrin)  81 mg PO DAILY [facility]


   Last Admin: 03/24/16 09:05 Dose:  81 mg


Atorvastatin Calcium (Lipitor)  40 mg PO DAILY [facility]


   Last Admin: 03/24/16 09:05 Dose:  40 mg


Divalproex Sodium (Depakote Dr(*Bid*))  500 mg PO BID [facility]


   Last Admin: 03/24/16 09:04 Dose:  500 mg


Enalapril Maleate (Vasotec)  10 mg PO DAILY [facility]


   Last Admin: 03/24/16 09:05 Dose:  10 mg


Famotidine (Pepcid)  20 mg PO BID [facility]


   Last Admin: 03/24/16 09:04 Dose:  20 mg


Ibuprofen (Motrin Tab)  600 mg PO Q8 PRN


   PRN Reason: Pain, moderate (4-7)


Levetiracetam (Keppra)  500 mg PO BID [facility]


   Last Admin: 03/24/16 09:05 Dose:  500 mg


Lorazepam (Ativan)  1 mg IM Q4 PRN


   PRN Reason: Agitation


Metoprolol Tartrate (Lopressor)  50 mg PO Q12 [facility]


   Last Admin: 03/24/16 09:05 Dose:  50 mg


Nicotine (Nicoderm Cq)  1 patch TD DAILY [facility]


   Last Admin: 03/24/16 09:04 Dose:  1 patch


Quetiapine Fumarate (Seroquel)  25 mg PO DAILY@1700 [facility]


   Last Admin: 03/23/16 16:41 Dose:  25 mg











- Labs


Labs: 


 





 03/19/16 05:30 





 03/19/16 05:30 





 











PT  11.2 SECONDS (9.4-12.0)   12/14/15  05:20    


 


INR  1.05  (0.89-1.20)   12/14/15  05:20    


 


APTT  36.2 SECONDS (23.1-32.0)  H  12/14/15  05:20    














- Constitutional


Appears: Well, Non-toxic, No Acute Distress





- Head Exam


Head Exam: ATRAUMATIC, NORMAL INSPECTION, NORMOCEPHALIC





- Eye Exam


Eye Exam: EOMI, Normal appearance, PERRL


Pupil Exam: NORMAL ACCOMODATION





- ENT Exam


ENT Exam: Mucous Membranes Moist, Normal Exam





- Neck Exam


Neck Exam: Full ROM, Normal Inspection





- Respiratory Exam


Respiratory Exam: Clear to Ausculation Bilateral, NORMAL BREATHING PATTERN





- Cardiovascular Exam


Cardiovascular Exam: REGULAR RHYTHM, RRR, +S1, +S2





- GI/Abdominal Exam


GI & Abdominal Exam: Soft, Normal Bowel Sounds





- Rectal Exam


Rectal Exam: NORMAL INSPECTION





- Extremities Exam


Extremities Exam: Full ROM, Normal Capillary Refill, Normal Inspection





- Back Exam


Back Exam: NORMAL INSPECTION





- Neurological Exam


Neurological Exam: Alert, Awake, CN II-XII Intact





- Psychiatric Exam


Psychiatric exam: Normal Affect





- Skin


Skin Exam: Dry, Intact, Normal Color, Warm





Assessment and Plan





- Assessment and Plan (Free Text)


Assessment: 


56 y M homeless, brought in after having a cardiac arrest in [place]. At present 

patient is medically stable but has cognitive impairment  with moments of 

agitation which makes his discharge unsafe.At present waiting on guardianship.





1. Cognitive Impairment with agitation


-Calm and cooperative at present 


- on Seroquel, Depakote and Ativan 


-Psych eval appreciated. Patient has no full capacity 


-patient awaiting guardianship and placement





2. Cardiac Arrest


- doing well, s/p extensive ICU admission


- s/p cardiology input





3.  HTN


- controlled with Metoprolol and Enalapril





4. Tobacco Abuse


- on Nicotine patch





5. CAD 


- on ASA, BB, ACEi


- add statin


- as noted per cardiology to have had a NSTEMI (with echo findings and elevated 

trop) as possible etiology of Cardiac arrest


- Echo - 12/2015 - LVEF~ , systolic funtion is moderatly impaired mild septal 

hypokinesis, borderline LV conc hypertrophy





6. DVT prophylaxis 


- ambulating in unit


- SCDs prn

## 2016-03-25 RX ADMIN — DIVALPROEX SODIUM SCH MG: 500 TABLET, DELAYED RELEASE ORAL at 16:24

## 2016-03-25 RX ADMIN — DIVALPROEX SODIUM SCH MG: 500 TABLET, DELAYED RELEASE ORAL at 08:29

## 2016-03-25 NOTE — CP.PCM.PN
Subjective





- Date & Time of Evaluation


Date of Evaluation: 03/25/16


Time of Evaluation: 13:00





- Subjective


Subjective: 


Pt seen and examined


looks comfortable


sitted on chair eating his meals


denies any CP, no SOB





Objective





- Vital Signs/Intake and Output


Vital Signs (last 24 hours): 


 











Temp Pulse Resp BP Pulse Ox


 


 97.7 F   64   20   156/91 H  98 


 


 03/25/16 08:28  03/25/16 08:28  03/25/16 08:28  03/25/16 08:28  03/25/16 08:28











- Medications


Medications: 


 Current Medications





Aspirin (Ecotrin)  81 mg PO DAILY [facility]


   Last Admin: 03/25/16 08:29 Dose:  81 mg


Atorvastatin Calcium (Lipitor)  40 mg PO DAILY [facility]


   Last Admin: 03/25/16 08:30 Dose:  40 mg


Divalproex Sodium (Depakote Dr(*Bid*))  500 mg PO BID [facility]


   Last Admin: 03/25/16 08:29 Dose:  500 mg


Enalapril Maleate (Vasotec)  10 mg PO DAILY [facility]


   Last Admin: 03/25/16 08:36 Dose:  10 mg


Famotidine (Pepcid)  20 mg PO BID [facility]


   Last Admin: 03/25/16 08:31 Dose:  20 mg


Ibuprofen (Motrin Tab)  600 mg PO Q8 PRN


   PRN Reason: Pain, moderate (4-7)


Levetiracetam (Keppra)  500 mg PO BID [facility]


   Last Admin: 03/25/16 08:30 Dose:  500 mg


Lorazepam (Ativan)  1 mg IM Q4 PRN


   PRN Reason: Agitation


   Last Admin: 03/24/16 20:27 Dose:  1 mg


Metoprolol Tartrate (Lopressor)  50 mg PO Q12 [facility]


   Last Admin: 03/25/16 08:30 Dose:  50 mg


Nicotine (Nicoderm Cq)  1 patch TD DAILY [facility]


   Last Admin: 03/25/16 08:31 Dose:  1 patch


Quetiapine Fumarate (Seroquel)  25 mg PO DAILY@1700 [facility]


   Last Admin: 03/24/16 16:17 Dose:  25 mg











- Labs


Labs: 


 





 03/19/16 05:30 





 03/19/16 05:30 





 











PT  11.2 SECONDS (9.4-12.0)   12/14/15  05:20    


 


INR  1.05  (0.89-1.20)   12/14/15  05:20    


 


APTT  36.2 SECONDS (23.1-32.0)  H  12/14/15  05:20    














- Constitutional


Appears: No Acute Distress





- Head Exam


Head Exam: NORMAL INSPECTION, NORMOCEPHALIC





- Eye Exam


Eye Exam: EOMI, Normal appearance


Pupil Exam: NORMAL ACCOMODATION





- ENT Exam


ENT Exam: Mucous Membranes Moist, Normal External Ear Exam





- Neck Exam


Neck Exam: Full ROM.  absent: Meningismus





- Respiratory Exam


Respiratory Exam: NORMAL BREATHING PATTERN.  absent: Respiratory Distress





- Cardiovascular Exam


Cardiovascular Exam: REGULAR RHYTHM, +S1, +S2





- GI/Abdominal Exam


GI & Abdominal Exam: Soft, Normal Bowel Sounds.  absent: Tenderness





- Extremities Exam


Extremities Exam: Full ROM, Normal Capillary Refill.  absent: Calf Tenderness





- Back Exam


Back Exam: Full ROM.  absent: CVA tenderness (L), CVA tenderness (R)





- Neurological Exam


Neurological Exam: Alert, Awake


Additional comments: 


oriented to person and place


moves all extremities





- Psychiatric Exam


Psychiatric exam: Flat Affect





- Skin


Skin Exam: Dry, Normal Color, Warm





Assessment and Plan


(1) Cognitive impairment


Status: Acute





(2) Agitation


Status: Acute





(3) Cardiac arrhythmia


Status: Acute





(4) Cardiac arrest


Status: Resolved





(5) CAD (coronary artery disease)


Status: Acute





(6) HTN (hypertension)


Status: Acute





(7) DVT prophylaxis


Status: Acute





(8) Diabetes mellitus with hyperglycemia


Status: Ruled-out





(9) History of seizure


Status: Chronic








- Assessment and Plan (Free Text)


Plan: 


56 y M homeless, brought in after having a cardiac arrest in [place]. At present 

patient is medically stable but has cognitive impairment  with moments of 

agitation which makes his discharge unsafe. At present waiting on guardianship.





1. Cognitive Impairment with agitation


-Calm and cooperative at present 


- on Seroquel, Depakote and Ativan 


-Psych eval appreciated. Patient has no full capacity 


-patient awaiting guardianship and placement





2. Cardiac Arrest


- doing well, s/p extensive ICU admission


- s/p cardiology input





3.  HTN


- controlled with Metoprolol and Enalapril





4. Tobacco Abuse


- on Nicotine patch





5. CAD 


- on ASA, BB, ACEi


- add statin


- as noted per cardiology to have had a NSTEMI (with echo findings and elevated 

trop) as possible etiology of Cardiac arrest


- Echo - 12/2015 - LVEF~ , systolic funtion is moderatly impaired mild septal 

hypokinesis, borderline LV conc hypertrophy





6. Hx of Seizure


- cont Keppra








DVT prophylaxis 


- ambulating in unit


- SCDs prn

## 2016-03-25 NOTE — CP.PCM.PN
Subjective





- Date & Time of Evaluation


Date of Evaluation: 03/25/16


Time of Evaluation: 11:00





- Subjective


Subjective: 


NO FEVER VSS





Objective





- Vital Signs/Intake and Output


Vital Signs (last 24 hours): 


 











Temp Pulse Resp BP Pulse Ox


 


 97.7 F   64   20   156/91 H  98 


 


 03/25/16 08:28  03/25/16 08:28  03/25/16 08:28  03/25/16 08:28  03/25/16 08:28











- Medications


Medications: 


 Current Medications





Aspirin (Ecotrin)  81 mg PO DAILY [facility]


   Last Admin: 03/25/16 08:29 Dose:  81 mg


Atorvastatin Calcium (Lipitor)  40 mg PO DAILY [facility]


   Last Admin: 03/25/16 08:30 Dose:  40 mg


Divalproex Sodium (Depakote Dr(*Bid*))  500 mg PO BID [facility]


   Last Admin: 03/25/16 08:29 Dose:  500 mg


Enalapril Maleate (Vasotec)  10 mg PO DAILY [facility]


   Last Admin: 03/25/16 08:36 Dose:  10 mg


Famotidine (Pepcid)  20 mg PO BID [facility]


   Last Admin: 03/25/16 08:31 Dose:  20 mg


Ibuprofen (Motrin Tab)  600 mg PO Q8 PRN


   PRN Reason: Pain, moderate (4-7)


Levetiracetam (Keppra)  500 mg PO BID [facility]


   Last Admin: 03/25/16 08:30 Dose:  500 mg


Lorazepam (Ativan)  1 mg IM Q4 PRN


   PRN Reason: Agitation


   Last Admin: 03/24/16 20:27 Dose:  1 mg


Metoprolol Tartrate (Lopressor)  50 mg PO Q12 [facility]


   Last Admin: 03/25/16 08:30 Dose:  50 mg


Nicotine (Nicoderm Cq)  1 patch TD DAILY [facility]


   Last Admin: 03/25/16 08:31 Dose:  1 patch


Quetiapine Fumarate (Seroquel)  25 mg PO DAILY@1700 [facility]


   Last Admin: 03/24/16 16:17 Dose:  25 mg











- Labs


Labs: 


 





 03/19/16 05:30 





 03/19/16 05:30 





 











PT  11.2 SECONDS (9.4-12.0)   12/14/15  05:20    


 


INR  1.05  (0.89-1.20)   12/14/15  05:20    


 


APTT  36.2 SECONDS (23.1-32.0)  H  12/14/15  05:20    














- Constitutional


Appears: Non-toxic, Chronically Ill





- Head Exam


Head Exam: NORMOCEPHALIC





- Eye Exam


Eye Exam: absent: Scleral icterus





- Neck Exam


Neck Exam: absent: Lymphadenopathy





- Respiratory Exam


Respiratory Exam: Decreased Breath Sounds, Clear to Ausculation Bilateral





- Cardiovascular Exam


Cardiovascular Exam: REGULAR RHYTHM, +S1, +S2





- GI/Abdominal Exam


GI & Abdominal Exam: Soft.  absent: Tenderness





- Rectal Exam


Rectal Exam: Deferred





-  Exam


 Exam: NORMAL INSPECTION





- Extremities Exam


Extremities Exam: absent: Calf Tenderness, Pedal Edema





- Back Exam


Back Exam: absent: CVA tenderness (L), CVA tenderness (R)





- Neurological Exam


Neurological Exam: Alert, Awake





Assessment and Plan


(1) Agitation


Status: Acute





(2) History of seizure


Status: Chronic





(3) NSTEMI (non-ST elevated myocardial infarction)


Status: Acute

## 2016-03-26 RX ADMIN — DIVALPROEX SODIUM SCH MG: 500 TABLET, DELAYED RELEASE ORAL at 08:24

## 2016-03-26 RX ADMIN — DIVALPROEX SODIUM SCH MG: 500 TABLET, DELAYED RELEASE ORAL at 16:01

## 2016-03-26 NOTE — CP.PCM.PN
Subjective





- Date & Time of Evaluation


Date of Evaluation: 03/26/16


Time of Evaluation: 13:00





- Subjective


Subjective: 


pt comfortable and cooperative, no distress, no complaints. no fc, n/v/d


ros negative





Objective





- Vital Signs/Intake and Output


Vital Signs (last 24 hours): 


 











Temp Pulse Resp BP Pulse Ox


 


 97.2 F L  62   20   133/76   98 


 


 03/26/16 08:57  03/26/16 08:57  03/26/16 08:57  03/26/16 08:57  03/26/16 08:57











- Medications


Medications: 


 Current Medications





Aspirin (Ecotrin)  81 mg PO DAILY [facility]


   Last Admin: 03/26/16 08:24 Dose:  81 mg


Atorvastatin Calcium (Lipitor)  40 mg PO DAILY [facility]


   Last Admin: 03/26/16 08:25 Dose:  40 mg


Divalproex Sodium (Depakote Dr(*Bid*))  500 mg PO BID [facility]


   Last Admin: 03/26/16 08:24 Dose:  500 mg


Enalapril Maleate (Vasotec)  10 mg PO DAILY [facility]


   Last Admin: 03/26/16 08:26 Dose:  10 mg


Famotidine (Pepcid)  20 mg PO BID [facility]


   Last Admin: 03/26/16 08:25 Dose:  20 mg


Ibuprofen (Motrin Tab)  600 mg PO Q8 PRN


   PRN Reason: Pain, moderate (4-7)


Levetiracetam (Keppra)  500 mg PO BID [facility]


   Last Admin: 03/26/16 08:25 Dose:  500 mg


Lorazepam (Ativan)  1 mg IM Q4 PRN


   PRN Reason: Agitation


   Last Admin: 03/24/16 20:27 Dose:  1 mg


Metoprolol Tartrate (Lopressor)  50 mg PO Q12 [facility]


   Last Admin: 03/26/16 08:25 Dose:  50 mg


Nicotine (Nicoderm Cq)  1 patch TD DAILY [facility]


   Last Admin: 03/26/16 08:25 Dose:  1 patch


Quetiapine Fumarate (Seroquel)  25 mg PO DAILY@1700 [facility]


   Last Admin: 03/25/16 16:23 Dose:  25 mg











- Labs


Labs: 


 





 03/19/16 05:30 





 03/19/16 05:30 





 











PT  11.2 SECONDS (9.4-12.0)   12/14/15  05:20    


 


INR  1.05  (0.89-1.20)   12/14/15  05:20    


 


APTT  36.2 SECONDS (23.1-32.0)  H  12/14/15  05:20    














- Constitutional


Appears: Well, Non-toxic, No Acute Distress





- Head Exam


Head Exam: ATRAUMATIC, NORMAL INSPECTION, NORMOCEPHALIC





- Eye Exam


Eye Exam: EOMI, Normal appearance, PERRL


Pupil Exam: NORMAL ACCOMODATION, PERRL





- ENT Exam


ENT Exam: Mucous Membranes Moist, Normal Exam





- Neck Exam


Neck Exam: Full ROM, Normal Inspection.  absent: Lymphadenopathy





- Respiratory Exam


Respiratory Exam: Clear to Ausculation Bilateral, NORMAL BREATHING PATTERN





- Cardiovascular Exam


Cardiovascular Exam: REGULAR RHYTHM, +S1, +S2.  absent: Murmur





- GI/Abdominal Exam


GI & Abdominal Exam: Soft, Normal Bowel Sounds.  absent: Tenderness





- Extremities Exam


Extremities Exam: Full ROM, Normal Capillary Refill, Normal Inspection.  absent

: Joint Swelling, Pedal Edema





- Back Exam


Back Exam: NORMAL INSPECTION





- Neurological Exam


Neurological Exam: Alert, Awake, CN II-XII Intact, Normal Gait, Oriented x3





- Psychiatric Exam


Psychiatric exam: Normal Affect, Normal Mood





- Skin


Skin Exam: Dry, Intact, Normal Color, Warm





Assessment and Plan


(1) Scrotal edema


Status: Chronic





(2) DVT prophylaxis


Assessment & Plan: 


scd and aehose


ambulation


Status: Acute





(3) Agitation


Assessment & Plan: 


seroquel/depakote


ativanhaldol prn


Status: Acute





(4) CAD (coronary artery disease)


Assessment & Plan: 


cont all meds


Status: Acute





(5) Smoking


Assessment & Plan: 


nicoderm


Status: Chronic





(6) Low back pain


Status: Acute

## 2016-03-27 RX ADMIN — DIVALPROEX SODIUM SCH MG: 500 TABLET, DELAYED RELEASE ORAL at 16:04

## 2016-03-27 RX ADMIN — DIVALPROEX SODIUM SCH MG: 500 TABLET, DELAYED RELEASE ORAL at 09:14

## 2016-03-27 NOTE — CP.PCM.PN
Subjective





- Date & Time of Evaluation


Date of Evaluation: 03/27/16


Time of Evaluation: 12:15





- Subjective


Subjective: 


pt comfortable in bed, no distress, no compaints. no f/c, n/v/d


ros negative





Objective





- Vital Signs/Intake and Output


Vital Signs (last 24 hours): 


 











Temp Pulse Resp BP Pulse Ox


 


 97.3 F L  64   20   127/79   98 


 


 03/27/16 08:00  03/27/16 09:14  03/27/16 08:00  03/27/16 09:14  03/27/16 08:00











- Medications


Medications: 


 Current Medications





Aspirin (Ecotrin)  81 mg PO DAILY [facility]


   Last Admin: 03/27/16 09:14 Dose:  81 mg


Atorvastatin Calcium (Lipitor)  40 mg PO DAILY [facility]


   Last Admin: 03/27/16 09:14 Dose:  40 mg


Divalproex Sodium (Depakote Dr(*Bid*))  500 mg PO BID [facility]


   Last Admin: 03/27/16 09:14 Dose:  500 mg


Enalapril Maleate (Vasotec)  10 mg PO DAILY [facility]


   Last Admin: 03/27/16 09:14 Dose:  10 mg


Famotidine (Pepcid)  20 mg PO BID [facility]


   Last Admin: 03/27/16 09:14 Dose:  20 mg


Ibuprofen (Motrin Tab)  600 mg PO Q8 PRN


   PRN Reason: Pain, moderate (4-7)


Levetiracetam (Keppra)  500 mg PO BID [facility]


   Last Admin: 03/27/16 09:13 Dose:  500 mg


Lorazepam (Ativan)  1 mg IM Q4 PRN


   PRN Reason: Agitation


   Last Admin: 03/24/16 20:27 Dose:  1 mg


Metoprolol Tartrate (Lopressor)  50 mg PO Q12 [facility]


   Last Admin: 03/27/16 09:14 Dose:  50 mg


Nicotine (Nicoderm Cq)  1 patch TD DAILY [facility]


   Last Admin: 03/27/16 09:13 Dose:  1 patch


Quetiapine Fumarate (Seroquel)  25 mg PO DAILY@1700 [facility]


   Last Admin: 03/26/16 16:01 Dose:  25 mg











- Labs


Labs: 


 





 03/19/16 05:30 





 03/19/16 05:30 





 











PT  11.2 SECONDS (9.4-12.0)   12/14/15  05:20    


 


INR  1.05  (0.89-1.20)   12/14/15  05:20    


 


APTT  36.2 SECONDS (23.1-32.0)  H  12/14/15  05:20    














- Constitutional


Appears: Well, Non-toxic, No Acute Distress





- Head Exam


Head Exam: ATRAUMATIC, NORMAL INSPECTION, NORMOCEPHALIC





- Eye Exam


Eye Exam: EOMI, Normal appearance, PERRL


Pupil Exam: NORMAL ACCOMODATION, PERRL





- ENT Exam


ENT Exam: Mucous Membranes Moist, Normal Exam





- Neck Exam


Neck Exam: Full ROM, Normal Inspection.  absent: Lymphadenopathy





- Respiratory Exam


Respiratory Exam: Clear to Ausculation Bilateral, NORMAL BREATHING PATTERN





- Cardiovascular Exam


Cardiovascular Exam: REGULAR RHYTHM, +S1, +S2.  absent: Murmur





- GI/Abdominal Exam


GI & Abdominal Exam: Soft, Normal Bowel Sounds.  absent: Tenderness





- Extremities Exam


Extremities Exam: Full ROM, Normal Capillary Refill, Normal Inspection.  absent

: Joint Swelling, Pedal Edema





- Back Exam


Back Exam: NORMAL INSPECTION





- Neurological Exam


Neurological Exam: Alert, Awake, CN II-XII Intact, Normal Gait, Oriented x3





- Psychiatric Exam


Psychiatric exam: Normal Affect, Normal Mood





- Skin


Skin Exam: Dry, Intact, Normal Color, Warm





Assessment and Plan


(1) Scrotal edema


Status: Chronic





(2) DVT prophylaxis


Assessment & Plan: 


scd and ae hose


ambulation


Status: Acute





(3) Agitation


Assessment & Plan: 


seroquel/depakote


ativan/haldol prn


Status: Acute





(4) CAD (coronary artery disease)


Assessment & Plan: 


cont all meds


Status: Acute





(5) Smoking


Assessment & Plan: 


nicoderm


Status: Chronic





(6) Low back pain


Status: Acute

## 2016-03-28 RX ADMIN — DIVALPROEX SODIUM SCH MG: 500 TABLET, DELAYED RELEASE ORAL at 08:46

## 2016-03-28 RX ADMIN — DIVALPROEX SODIUM SCH MG: 500 TABLET, DELAYED RELEASE ORAL at 16:32

## 2016-03-28 NOTE — CP.PCM.PN
Subjective





- Date & Time of Evaluation


Date of Evaluation: 03/28/16


Time of Evaluation: 14:05





- Subjective


Subjective: 


pt comfortable in bed. no distress/complaints. no f/c, n/v/d.


ros negative





Objective





- Vital Signs/Intake and Output


Vital Signs (last 24 hours): 


 











Temp Pulse Resp BP Pulse Ox


 


 97.7 F   72   20   151/84 H  98 


 


 03/28/16 08:15  03/28/16 08:46  03/28/16 08:15  03/28/16 08:46  03/28/16 08:15











- Medications


Medications: 


 Current Medications





Aspirin (Ecotrin)  81 mg PO DAILY [facility]


   Last Admin: 03/28/16 08:47 Dose:  81 mg


Atorvastatin Calcium (Lipitor)  40 mg PO DAILY [facility]


   Last Admin: 03/28/16 08:46 Dose:  40 mg


Divalproex Sodium (Depakote Dr(*Bid*))  500 mg PO BID [facility]


   Last Admin: 03/28/16 08:46 Dose:  500 mg


Enalapril Maleate (Vasotec)  10 mg PO DAILY [facility]


   Last Admin: 03/28/16 08:46 Dose:  10 mg


Famotidine (Pepcid)  20 mg PO BID [facility]


   Last Admin: 03/28/16 08:47 Dose:  20 mg


Ibuprofen (Motrin Tab)  600 mg PO Q8 PRN


   PRN Reason: Pain, moderate (4-7)


Levetiracetam (Keppra)  500 mg PO BID [facility]


   Last Admin: 03/28/16 08:47 Dose:  500 mg


Lorazepam (Ativan)  1 mg IM Q4 PRN


   PRN Reason: Agitation


   Last Admin: 03/24/16 20:27 Dose:  1 mg


Metoprolol Tartrate (Lopressor)  50 mg PO Q12 [facility]


   Last Admin: 03/28/16 08:46 Dose:  50 mg


Nicotine (Nicoderm Cq)  1 patch TD DAILY [facility]


   Last Admin: 03/28/16 08:46 Dose:  1 patch


Quetiapine Fumarate (Seroquel)  25 mg PO DAILY@1700 [facility]


   Last Admin: 03/27/16 16:04 Dose:  25 mg











- Labs


Labs: 


 





 03/19/16 05:30 





 03/19/16 05:30 





 











PT  11.2 SECONDS (9.4-12.0)   12/14/15  05:20    


 


INR  1.05  (0.89-1.20)   12/14/15  05:20    


 


APTT  36.2 SECONDS (23.1-32.0)  H  12/14/15  05:20    














- Constitutional


Appears: Well, Non-toxic, No Acute Distress





- Head Exam


Head Exam: ATRAUMATIC, NORMAL INSPECTION, NORMOCEPHALIC





- Eye Exam


Eye Exam: EOMI, Normal appearance, PERRL


Pupil Exam: NORMAL ACCOMODATION, PERRL





- ENT Exam


ENT Exam: Mucous Membranes Moist, Normal Exam





- Neck Exam


Neck Exam: Full ROM, Normal Inspection.  absent: Lymphadenopathy





- Respiratory Exam


Respiratory Exam: Clear to Ausculation Bilateral, NORMAL BREATHING PATTERN





- Cardiovascular Exam


Cardiovascular Exam: REGULAR RHYTHM, +S1, +S2.  absent: Murmur





- GI/Abdominal Exam


GI & Abdominal Exam: Soft, Normal Bowel Sounds.  absent: Tenderness





- Extremities Exam


Extremities Exam: Full ROM, Normal Capillary Refill, Normal Inspection.  absent

: Joint Swelling, Pedal Edema





- Back Exam


Back Exam: NORMAL INSPECTION





- Neurological Exam


Neurological Exam: Alert, Awake, CN II-XII Intact, Normal Gait, Oriented x3





- Psychiatric Exam


Psychiatric exam: Normal Affect, Normal Mood





- Skin


Skin Exam: Dry, Intact, Normal Color, Warm





Assessment and Plan


(1) Scrotal edema


Status: Chronic





(2) DVT prophylaxis


Assessment & Plan: 


scd and aehose


ambulation


Status: Acute





(3) Agitation


Assessment & Plan: 


seroquel/depakote


ativan/haldol prn


Status: Acute





(4) CAD (coronary artery disease)


Assessment & Plan: 


cont meds


Status: Acute





(5) Smoking


Assessment & Plan: 


nicoderm


Status: Chronic





(6) Low back pain


Status: Acute

## 2016-03-29 RX ADMIN — DIVALPROEX SODIUM SCH MG: 500 TABLET, DELAYED RELEASE ORAL at 08:41

## 2016-03-29 RX ADMIN — DIVALPROEX SODIUM SCH MG: 500 TABLET, DELAYED RELEASE ORAL at 17:21

## 2016-03-29 NOTE — PCM.PYCHPN
Psychiatric Progress Note





- Psychiatric Progress Note


Patient seen today, lenth of contact: Case discussed with nurse


Patient Chief Complaint: 


I learned from nurse that security was called last night and he was given prns. 

He was trying


to leave the hospital. I enter the room and find him laying in bed. He seems 

annoyed with 


my presence and refuses to take the phone (with ) for dialogue. He 

raises his 


hand again shaking it in refusal when I ask. Nurse reports that this morning he 

has been "okay"


and took his medications with no problem








MSE


calm


uncooperative


almost no eye contact


decreased PMA


mood- cannot assess


affect is constricted


no speech


thought process - cannot assess


thought content - cannot assess








Medical decision making


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


Pt truly has no real idea for reason behind continued hospitalization and I 

expect that he


will try to escape. I expect that he will continue to try to escape as this 

must seem


like an unreasonable incarceration from his perspective. His behavior is 

actually appropriate


given the circumstances. The current medication regimen is appropriate.


- Suggestion-is there is any way pt can be taken out of his room and allowed to 

walk 


  or get fresh air on a regular basis- precautions would need to be taken given 

the flight risk - but it 


  may prove to have a calming effect for him


























Medication Change: No


Medical Record Reviewed: Yes





Mental Status Examination





- Cognitive Function


Orientation: Person


Memory: Impaired


Attention: Poor


Concentration: Poor


Association: Loose


Fund of Knowledge: Poor





- Mood


Mood: Neutral





- Affect


Affect: Constricted





- Speech


Speech: Soft





- Formal Thought Process


Formal Thought Process: No Impairment





- Suicidal Ideation


Suicidal Ideation: No





- Homicidal Ideation


Homicidal Ideation: No





Goal/Treatment Plan





- Goal/Treatment Plan


Need for Continued Stay: Remain at risks for inpatient hospitalization


Progress Toward Problem(s) and Goals/Treatment Plan: 


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





Medical decision making


-currently patient cannot fulfill - the criteria fro capacity- 


  --he cannot present the choices upon discharge


Mr. Carpenter has the right to live on the streets, go to a shelter, panhandle or 

refuse to take medication- he just has to able to verbalize this choice


 and the risks and benefits of his decisions, which he is unable to do


-also given the length of time that he has been impaired, if he is able to do 

this, it will need to be consistent and cannot


 be on one or two occasions. I suggest that social work continue with the 

guardianship as I do not anticipate pt's mental


 status will change.

## 2016-03-29 NOTE — CP.PCM.PN
Subjective





- Date & Time of Evaluation


Date of Evaluation: 03/29/16


Time of Evaluation: 15:30





- Subjective


Subjective: 


pt comfortable in bed, no f/c, n/v/d. had mild back pain last night-took meds w

/ relief


ros negative excep tmild low back pain


pt remains unable to be d/c for safety





Objective





- Vital Signs/Intake and Output


Vital Signs (last 24 hours): 


 











Temp Pulse Resp BP Pulse Ox


 


 98.4 F   85   18   138/86   99 


 


 03/29/16 16:38  03/29/16 16:38  03/29/16 16:38  03/29/16 16:38  03/29/16 16:38











- Medications


Medications: 


 Current Medications





Aspirin (Ecotrin)  81 mg PO DAILY [facility]


   Last Admin: 03/29/16 08:41 Dose:  81 mg


Atorvastatin Calcium (Lipitor)  40 mg PO DAILY [facility]


   Last Admin: 03/29/16 08:41 Dose:  40 mg


Divalproex Sodium (Depakote Dr(*Bid*))  500 mg PO BID [facility]


   Last Admin: 03/29/16 17:21 Dose:  500 mg


Enalapril Maleate (Vasotec)  10 mg PO DAILY [facility]


   Last Admin: 03/29/16 08:42 Dose:  10 mg


Famotidine (Pepcid)  20 mg PO BID [facility]


   Last Admin: 03/29/16 17:21 Dose:  20 mg


Ibuprofen (Motrin Tab)  600 mg PO Q8 PRN


   PRN Reason: Pain, moderate (4-7)


Levetiracetam (Keppra)  500 mg PO BID [facility]


   Last Admin: 03/29/16 17:21 Dose:  500 mg


Lorazepam (Ativan)  1 mg IM Q4 PRN


   PRN Reason: Agitation


   Last Admin: 03/28/16 19:57 Dose:  1 mg


Metoprolol Tartrate (Lopressor)  50 mg PO Q12 [facility]


   Last Admin: 03/29/16 08:42 Dose:  50 mg


Nicotine (Nicoderm Cq)  1 patch TD DAILY [facility]


   Last Admin: 03/29/16 08:42 Dose:  1 patch


Quetiapine Fumarate (Seroquel)  25 mg PO DAILY@1700 [facility]


   Last Admin: 03/29/16 17:21 Dose:  25 mg











- Labs


Labs: 


 





 03/19/16 05:30 





 03/19/16 05:30 





 











PT  11.2 SECONDS (9.4-12.0)   12/14/15  05:20    


 


INR  1.05  (0.89-1.20)   12/14/15  05:20    


 


APTT  36.2 SECONDS (23.1-32.0)  H  12/14/15  05:20    














- Constitutional


Appears: Well, Non-toxic, No Acute Distress





- Head Exam


Head Exam: ATRAUMATIC, NORMAL INSPECTION, NORMOCEPHALIC





- Eye Exam


Eye Exam: EOMI, Normal appearance, PERRL


Pupil Exam: NORMAL ACCOMODATION, PERRL





- ENT Exam


ENT Exam: Mucous Membranes Moist, Normal Exam





- Neck Exam


Neck Exam: Full ROM, Normal Inspection.  absent: Lymphadenopathy





- Respiratory Exam


Respiratory Exam: Clear to Ausculation Bilateral, NORMAL BREATHING PATTERN





- Cardiovascular Exam


Cardiovascular Exam: REGULAR RHYTHM, +S1, +S2.  absent: Murmur





- GI/Abdominal Exam


GI & Abdominal Exam: Soft, Normal Bowel Sounds.  absent: Tenderness





- Extremities Exam


Extremities Exam: Full ROM, Normal Capillary Refill, Normal Inspection.  absent

: Joint Swelling, Pedal Edema





- Back Exam


Back Exam: NORMAL INSPECTION





- Neurological Exam


Neurological Exam: Alert, Awake, CN II-XII Intact, Normal Gait, Oriented x3





- Psychiatric Exam


Psychiatric exam: Normal Affect, Normal Mood





- Skin


Skin Exam: Dry, Intact, Normal Color, Warm





Assessment and Plan


(1) Scrotal edema


Status: Chronic





(2) DVT prophylaxis


Assessment & Plan: 


scd and ae hose


ambulation


Status: Acute





(3) Agitation


Assessment & Plan: 


seroquel/depakote


ativan/haldol prn


Status: Acute





(4) CAD (coronary artery disease)


Assessment & Plan: 


cont meds


Status: Acute





(5) Smoking


Assessment & Plan: 


nicoderm


Status: Chronic





(6) Low back pain


Assessment & Plan: 


ibuprofen prn


Status: Acute

## 2016-03-30 RX ADMIN — DIVALPROEX SODIUM SCH MG: 500 TABLET, DELAYED RELEASE ORAL at 18:14

## 2016-03-30 RX ADMIN — DIVALPROEX SODIUM SCH MG: 500 TABLET, DELAYED RELEASE ORAL at 10:40

## 2016-03-30 NOTE — CP.PCM.PN
Subjective





- Date & Time of Evaluation


Date of Evaluation: 03/30/16


Time of Evaluation: 10:44





- Subjective


Subjective: 


pt in no distress/pain/dyspnea. no cp. no f/c, n/v/d.


ros negative.





Objective





- Vital Signs/Intake and Output


Vital Signs (last 24 hours): 


 











Temp Pulse Resp BP Pulse Ox


 


 97.5 F L  66   18   125/69   97 


 


 03/30/16 08:43  03/30/16 10:41  03/30/16 08:43  03/30/16 10:41  03/30/16 08:43











- Medications


Medications: 


 Current Medications





Aspirin (Ecotrin)  81 mg PO DAILY [facility]


   Last Admin: 03/30/16 10:40 Dose:  81 mg


Atorvastatin Calcium (Lipitor)  40 mg PO DAILY [facility]


   Last Admin: 03/30/16 10:41 Dose:  40 mg


Divalproex Sodium (Depakote Dr(*Bid*))  500 mg PO BID [facility]


   Last Admin: 03/30/16 10:40 Dose:  500 mg


Enalapril Maleate (Vasotec)  10 mg PO DAILY [facility]


   Last Admin: 03/30/16 10:42 Dose:  10 mg


Famotidine (Pepcid)  20 mg PO BID [facility]


   Last Admin: 03/30/16 10:42 Dose:  20 mg


Ibuprofen (Motrin Tab)  600 mg PO Q8 PRN


   PRN Reason: Pain, moderate (4-7)


Levetiracetam (Keppra)  500 mg PO BID [facility]


   Last Admin: 03/30/16 10:41 Dose:  500 mg


Lorazepam (Ativan)  1 mg IM Q4 PRN


   PRN Reason: Agitation


   Last Admin: 03/28/16 19:57 Dose:  1 mg


Metoprolol Tartrate (Lopressor)  50 mg PO Q12 [facility]


   Last Admin: 03/30/16 10:41 Dose:  50 mg


Nicotine (Nicoderm Cq)  1 patch TD DAILY [facility]


   Last Admin: 03/30/16 10:41 Dose:  1 patch


Quetiapine Fumarate (Seroquel)  25 mg PO DAILY@1700 [facility]


   Last Admin: 03/29/16 17:21 Dose:  25 mg











- Labs


Labs: 


 





 03/19/16 05:30 





 03/19/16 05:30 





 











PT  11.2 SECONDS (9.4-12.0)   12/14/15  05:20    


 


INR  1.05  (0.89-1.20)   12/14/15  05:20    


 


APTT  36.2 SECONDS (23.1-32.0)  H  12/14/15  05:20    














- Constitutional


Appears: Well, Non-toxic, No Acute Distress





- Head Exam


Head Exam: ATRAUMATIC, NORMAL INSPECTION, NORMOCEPHALIC





- Eye Exam


Eye Exam: EOMI, Normal appearance, PERRL


Pupil Exam: NORMAL ACCOMODATION, PERRL





- ENT Exam


ENT Exam: Mucous Membranes Moist, Normal Exam





- Neck Exam


Neck Exam: Full ROM, Normal Inspection.  absent: Lymphadenopathy





- Respiratory Exam


Respiratory Exam: Clear to Ausculation Bilateral, NORMAL BREATHING PATTERN





- Cardiovascular Exam


Cardiovascular Exam: REGULAR RHYTHM, +S1, +S2.  absent: Murmur





- GI/Abdominal Exam


GI & Abdominal Exam: Soft, Normal Bowel Sounds.  absent: Tenderness





- Extremities Exam


Extremities Exam: Full ROM, Normal Capillary Refill, Normal Inspection.  absent

: Joint Swelling, Pedal Edema





- Back Exam


Back Exam: NORMAL INSPECTION





- Neurological Exam


Neurological Exam: Alert, Awake, CN II-XII Intact, Normal Gait, Oriented x3





- Psychiatric Exam


Psychiatric exam: Normal Affect, Normal Mood





- Skin


Skin Exam: Dry, Intact, Normal Color, Warm





Assessment and Plan


(1) Scrotal edema


Status: Chronic





(2) DVT prophylaxis


Assessment & Plan: 


scd and aehose


ambulation


Status: Acute





(3) Agitation


Assessment & Plan: 


seroquel/depakote


ativan/haldol prn


Status: Acute





(4) CAD (coronary artery disease)


Assessment & Plan: 


cont meds


Status: Acute





(5) Smoking


Assessment & Plan: 


nicoderm


Status: Chronic





(6) Low back pain


Assessment & Plan: 


ibuprofen prn


Status: Acute

## 2016-03-31 RX ADMIN — DIVALPROEX SODIUM SCH MG: 500 TABLET, DELAYED RELEASE ORAL at 16:06

## 2016-03-31 RX ADMIN — DIVALPROEX SODIUM SCH MG: 500 TABLET, DELAYED RELEASE ORAL at 10:36

## 2016-03-31 NOTE — CP.PCM.PN
Subjective





- Date & Time of Evaluation


Date of Evaluation: 03/31/16


Time of Evaluation: 09:22





- Subjective


Subjective: 


pt is in no distress/apin/dyspnea. no f/c, n/v/d.  


pt required ativan/haldol for agitation last night.  


calm and cooperative at present


ros negative except agitation





Objective





- Vital Signs/Intake and Output


Vital Signs (last 24 hours): 


 











Temp Pulse Resp BP Pulse Ox


 


 97.5 F L  74   20   132/88   100 


 


 03/31/16 08:17  03/31/16 08:17  03/31/16 08:17  03/31/16 08:17  03/31/16 08:17











- Medications


Medications: 


 Current Medications





Aspirin (Ecotrin)  81 mg PO DAILY [facility]


   Last Admin: 03/30/16 10:40 Dose:  81 mg


Atorvastatin Calcium (Lipitor)  40 mg PO DAILY [facility]


   Last Admin: 03/30/16 10:41 Dose:  40 mg


Divalproex Sodium (Depakote Dr(*Bid*))  500 mg PO BID [facility]


   Last Admin: 03/30/16 18:14 Dose:  500 mg


Enalapril Maleate (Vasotec)  10 mg PO DAILY [facility]


   Last Admin: 03/30/16 10:42 Dose:  10 mg


Famotidine (Pepcid)  20 mg PO BID [facility]


   Last Admin: 03/30/16 18:15 Dose:  20 mg


Haloperidol Lactate (Haldol)  5 mg IM Q6 PRN


   PRN Reason: Agitation


   Last Admin: 03/30/16 21:19 Dose:  5 mg


Ibuprofen (Motrin Tab)  600 mg PO Q8 PRN


   PRN Reason: Pain, moderate (4-7)


Levetiracetam (Keppra)  500 mg PO BID [facility]


   Last Admin: 03/30/16 18:15 Dose:  500 mg


Lorazepam (Ativan)  1 mg IM Q4 PRN


   PRN Reason: Agitation


   Last Admin: 03/30/16 21:00 Dose:  1 mg


Metoprolol Tartrate (Lopressor)  50 mg PO Q12 [facility]


   Last Admin: 03/30/16 21:51 Dose:  50 mg


Nicotine (Nicoderm Cq)  1 patch TD DAILY [facility]


   Last Admin: 03/30/16 10:41 Dose:  1 patch


Quetiapine Fumarate (Seroquel)  25 mg PO DAILY@1700 [facility]


   Last Admin: 03/30/16 18:15 Dose:  25 mg











- Labs


Labs: 


 





 03/19/16 05:30 





 03/19/16 05:30 





 











PT  11.2 SECONDS (9.4-12.0)   12/14/15  05:20    


 


INR  1.05  (0.89-1.20)   12/14/15  05:20    


 


APTT  36.2 SECONDS (23.1-32.0)  H  12/14/15  05:20    














- Constitutional


Appears: Well, Non-toxic, No Acute Distress





- Head Exam


Head Exam: ATRAUMATIC, NORMAL INSPECTION, NORMOCEPHALIC





- Eye Exam


Eye Exam: EOMI, Normal appearance, PERRL


Pupil Exam: NORMAL ACCOMODATION, PERRL





- ENT Exam


ENT Exam: Mucous Membranes Moist, Normal Exam





- Neck Exam


Neck Exam: Full ROM, Normal Inspection.  absent: Lymphadenopathy





- Respiratory Exam


Respiratory Exam: Clear to Ausculation Bilateral, NORMAL BREATHING PATTERN





- Cardiovascular Exam


Cardiovascular Exam: REGULAR RHYTHM, +S1, +S2.  absent: Murmur





- GI/Abdominal Exam


GI & Abdominal Exam: Soft, Normal Bowel Sounds.  absent: Tenderness





- Extremities Exam


Extremities Exam: Full ROM, Normal Capillary Refill, Normal Inspection.  absent

: Joint Swelling, Pedal Edema





- Back Exam


Back Exam: NORMAL INSPECTION





- Neurological Exam


Neurological Exam: Alert, Awake, CN II-XII Intact, Normal Gait, Oriented x3





- Psychiatric Exam


Psychiatric exam: Normal Affect, Normal Mood





- Skin


Skin Exam: Dry, Intact, Normal Color, Warm





Assessment and Plan


(1) Scrotal edema


Status: Chronic





(2) DVT prophylaxis


Assessment & Plan: 


scd and ae hose


ambulation


Status: Acute





(3) Agitation


Assessment & Plan: 


seroquel/depakote


ativan/haldol prn


Status: Acute





(4) CAD (coronary artery disease)


Assessment & Plan: 


cont all meds


Status: Acute





(5) Smoking


Assessment & Plan: 


nicoderm


Status: Chronic





(6) Low back pain


Assessment & Plan: 


ibuprofen prn


Status: Acute

## 2016-04-01 RX ADMIN — DIVALPROEX SODIUM SCH MG: 500 TABLET, DELAYED RELEASE ORAL at 09:09

## 2016-04-01 RX ADMIN — DIVALPROEX SODIUM SCH MG: 500 TABLET, DELAYED RELEASE ORAL at 16:18

## 2016-04-01 NOTE — CP.PCM.PN
Subjective





- Date & Time of Evaluation


Date of Evaluation: 04/01/16


Time of Evaluation: 08:59





- Subjective


Subjective: 


pt comfortable in bed, joking around w/ house keeping. no complaitns offered. 

no f/c, n/v/d.  


ros negative





Objective





- Vital Signs/Intake and Output


Vital Signs (last 24 hours): 


 











Temp Pulse Resp BP Pulse Ox


 


 97.7 F   86   20   131/94 H  97 


 


 04/01/16 01:02  04/01/16 01:02  04/01/16 01:02  04/01/16 01:02  04/01/16 01:02











- Medications


Medications: 


 Current Medications





Aspirin (Ecotrin)  81 mg PO DAILY [facility]


   Last Admin: 03/31/16 10:37 Dose:  81 mg


Atorvastatin Calcium (Lipitor)  40 mg PO DAILY [facility]


   Last Admin: 03/31/16 10:36 Dose:  40 mg


Divalproex Sodium (Depakote Dr(*Bid*))  500 mg PO BID [facility]


   Last Admin: 03/31/16 16:06 Dose:  500 mg


Enalapril Maleate (Vasotec)  10 mg PO DAILY [facility]


   Last Admin: 03/31/16 10:36 Dose:  10 mg


Famotidine (Pepcid)  20 mg PO BID [facility]


   Last Admin: 03/31/16 16:06 Dose:  20 mg


Haloperidol Lactate (Haldol)  5 mg IM Q6 PRN


   PRN Reason: Agitation


   Last Admin: 03/30/16 21:19 Dose:  5 mg


Ibuprofen (Motrin Tab)  600 mg PO Q8 PRN


   PRN Reason: Pain, moderate (4-7)


Levetiracetam (Keppra)  500 mg PO BID [facility]


   Last Admin: 03/31/16 16:06 Dose:  500 mg


Lorazepam (Ativan)  1 mg IM Q4 PRN


   PRN Reason: Agitation


   Last Admin: 03/30/16 21:00 Dose:  1 mg


Metoprolol Tartrate (Lopressor)  50 mg PO Q12 [facility]


   Last Admin: 03/31/16 21:39 Dose:  50 mg


Nicotine (Nicoderm Cq)  1 patch TD DAILY [facility]


   Last Admin: 03/31/16 10:37 Dose:  1 patch


Quetiapine Fumarate (Seroquel)  25 mg PO DAILY@1700 [facility]


   Last Admin: 03/31/16 16:06 Dose:  25 mg











- Labs


Labs: 


 





 03/19/16 05:30 





 03/19/16 05:30 





 











PT  11.2 SECONDS (9.4-12.0)   12/14/15  05:20    


 


INR  1.05  (0.89-1.20)   12/14/15  05:20    


 


APTT  36.2 SECONDS (23.1-32.0)  H  12/14/15  05:20    














- Constitutional


Appears: Well, Non-toxic, No Acute Distress





- Head Exam


Head Exam: ATRAUMATIC, NORMAL INSPECTION, NORMOCEPHALIC





- Eye Exam


Eye Exam: EOMI, Normal appearance, PERRL


Pupil Exam: NORMAL ACCOMODATION, PERRL





- ENT Exam


ENT Exam: Mucous Membranes Moist, Normal Exam





- Neck Exam


Neck Exam: Full ROM, Normal Inspection.  absent: Lymphadenopathy





- Respiratory Exam


Respiratory Exam: Clear to Ausculation Bilateral, NORMAL BREATHING PATTERN





- Cardiovascular Exam


Cardiovascular Exam: REGULAR RHYTHM, +S1, +S2.  absent: Murmur





- GI/Abdominal Exam


GI & Abdominal Exam: Soft, Normal Bowel Sounds.  absent: Tenderness





- Extremities Exam


Extremities Exam: Full ROM, Normal Capillary Refill, Normal Inspection.  absent

: Joint Swelling, Pedal Edema





- Back Exam


Back Exam: NORMAL INSPECTION





- Neurological Exam


Neurological Exam: Alert, Awake, CN II-XII Intact, Normal Gait, Oriented x3





- Psychiatric Exam


Psychiatric exam: Normal Affect, Normal Mood





- Skin


Skin Exam: Dry, Intact, Normal Color, Warm





Assessment and Plan


(1) Scrotal edema


Status: Chronic





(2) DVT prophylaxis


Assessment & Plan: 


scd and ae hose,ambulation


Status: Acute





(3) Agitation


Assessment & Plan: 


seroquel/depakote


ativan/haldol prn


Status: Acute





(4) CAD (coronary artery disease)


Assessment & Plan: 


cont all meds


Status: Acute





(5) Smoking


Assessment & Plan: 


nicoderm


Status: Chronic





(6) Low back pain


Assessment & Plan: 


ibuprofen prn


Status: Acute

## 2016-04-02 RX ADMIN — DIVALPROEX SODIUM SCH MG: 500 TABLET, DELAYED RELEASE ORAL at 09:06

## 2016-04-02 RX ADMIN — DIVALPROEX SODIUM SCH MG: 500 TABLET, DELAYED RELEASE ORAL at 16:38

## 2016-04-02 NOTE — CP.PCM.PN
Subjective





- Date & Time of Evaluation


Date of Evaluation: 04/02/16


Time of Evaluation: 13:08





- Subjective


Subjective: 


pt calm adn comfortable. no f/c, n/v/d. no complaints


ros negative





Objective





- Vital Signs/Intake and Output


Vital Signs (last 24 hours): 


 











Temp Pulse Resp BP Pulse Ox


 


 97.5 F L  72   20   122/73   98 


 


 04/02/16 07:31  04/02/16 09:06  04/02/16 10:00  04/02/16 09:06  04/02/16 07:31











- Medications


Medications: 


 Current Medications





Aspirin (Ecotrin)  [facility] mg PO DAILY [facility]


   Last Admin: 04/02/16 09:06 Dose:  81 mg


Atorvastatin Calcium (Lipitor)  40 mg PO DAILY [facility]


   Last Admin: 04/02/16 09:06 Dose:  40 mg


Divalproex Sodium (Depakote Dr(*Bid*))  500 mg PO BID [facility]


   Last Admin: 04/02/16 09:06 Dose:  500 mg


Enalapril Maleate (Vasotec)  10 mg PO DAILY [facility]


   Last Admin: 04/02/16 09:07 Dose:  10 mg


Famotidine (Pepcid)  20 mg PO BID [facility]


   Last Admin: 04/02/16 09:07 Dose:  20 mg


Haloperidol Lactate (Haldol)  5 mg IM Q6 PRN


   PRN Reason: Agitation


   Last Admin: 03/30/16 21:19 Dose:  5 mg


Ibuprofen (Motrin Tab)  600 mg PO Q8 PRN


   PRN Reason: Pain, moderate (4-7)


   Last Admin: 04/02/16 09:07 Dose:  600 mg


Levetiracetam (Keppra)  500 mg PO BID [facility]


   Last Admin: 04/02/16 09:06 Dose:  500 mg


Lorazepam (Ativan)  1 mg IM Q4 PRN


   PRN Reason: Agitation


   Last Admin: 04/01/16 16:25 Dose:  1 mg


Metoprolol Tartrate (Lopressor)  50 mg PO Q12 [facility]


   Last Admin: 04/02/16 09:06 Dose:  50 mg


Nicotine (Nicoderm Cq)  1 patch TD DAILY [facility]


   Last Admin: 04/02/16 09:07 Dose:  1 patch


Quetiapine Fumarate (Seroquel)  25 mg PO DAILY@1700 [facility]


   Last Admin: 04/01/16 16:18 Dose:  25 mg











- Labs


Labs: 


 





 03/19/16 05:30 





 03/19/16 05:30 





 











PT  11.2 SECONDS (9.4-12.0)   12/14/15  05:20    


 


INR  1.05  (0.89-1.20)   12/14/15  05:20    


 


APTT  36.2 SECONDS (23.1-32.0)  H  12/14/15  05:20    














- Constitutional


Appears: Well, Non-toxic, No Acute Distress





- Head Exam


Head Exam: ATRAUMATIC, NORMAL INSPECTION, NORMOCEPHALIC





- Eye Exam


Eye Exam: EOMI, Normal appearance, PERRL


Pupil Exam: NORMAL ACCOMODATION, PERRL





- ENT Exam


ENT Exam: Mucous Membranes Moist, Normal Exam





- Neck Exam


Neck Exam: Full ROM, Normal Inspection.  absent: Lymphadenopathy





- Respiratory Exam


Respiratory Exam: Clear to Ausculation Bilateral, NORMAL BREATHING PATTERN





- Cardiovascular Exam


Cardiovascular Exam: REGULAR RHYTHM, +S1, +S2.  absent: Murmur





- GI/Abdominal Exam


GI & Abdominal Exam: Soft, Normal Bowel Sounds.  absent: Tenderness





- Extremities Exam


Extremities Exam: Full ROM, Normal Capillary Refill, Normal Inspection.  absent

: Joint Swelling, Pedal Edema





- Back Exam


Back Exam: NORMAL INSPECTION





- Neurological Exam


Neurological Exam: Alert, Awake, CN II-XII Intact, Normal Gait, Oriented x3





- Psychiatric Exam


Psychiatric exam: Normal Affect, Normal Mood





- Skin


Skin Exam: Dry, Intact, Normal Color, Warm





Assessment and Plan


(1) Scrotal edema


Status: Chronic





(2) DVT prophylaxis


Assessment & Plan: 


scd and aehose


ambulation


Status: Acute





(3) Agitation


Assessment & Plan: 


seroquel/depakote


ativan/haldol prn


Status: Acute





(4) CAD (coronary artery disease)


Assessment & Plan: 


cont meds


Status: Acute





(5) Smoking


Assessment & Plan: 


nicoderm


Status: Chronic





(6) Low back pain


Assessment & Plan: 


ibuprofen prn


Status: Acute

## 2016-04-03 RX ADMIN — DIVALPROEX SODIUM SCH MG: 500 TABLET, DELAYED RELEASE ORAL at 16:00

## 2016-04-03 RX ADMIN — DIVALPROEX SODIUM SCH MG: 500 TABLET, DELAYED RELEASE ORAL at 09:20

## 2016-04-03 NOTE — CP.PCM.PN
Subjective





- Date & Time of Evaluation


Date of Evaluation: 04/03/16


Time of Evaluation: 10:00





- Subjective


Subjective: 


AWAKE ALERT RESPONSIVE


CONVERSES EASILY IN ENGLISH


DENIES FEVER CHILLS OR CHEST PAIN 





Objective





- Vital Signs/Intake and Output


Vital Signs (last 24 hours): 


 











Temp Pulse Resp BP Pulse Ox


 


 97.3 F L  63   18   116/77   100 


 


 04/03/16 07:51  04/03/16 07:51  04/03/16 07:51  04/03/16 09:19  04/03/16 07:51











- Medications


Medications: 


 Current Medications





Aspirin (Ecotrin)  81 mg PO DAILY [facility]


   Last Admin: 04/03/16 09:20 Dose:  81 mg


Atorvastatin Calcium (Lipitor)  40 mg PO DAILY [facility]


   Last Admin: 04/03/16 09:19 Dose:  40 mg


Divalproex Sodium (Depakote Dr(*Bid*))  500 mg PO BID [facility]


   Last Admin: 04/03/16 09:20 Dose:  500 mg


Enalapril Maleate (Vasotec)  10 mg PO DAILY [facility]


   Last Admin: 04/03/16 09:20 Dose:  10 mg


Famotidine (Pepcid)  20 mg PO BID [facility]


   Last Admin: 04/03/16 09:20 Dose:  20 mg


Haloperidol Lactate (Haldol)  5 mg IM Q6 PRN


   PRN Reason: Agitation


   Last Admin: 04/02/16 20:25 Dose:  5 mg


Ibuprofen (Motrin Tab)  600 mg PO Q8 PRN


   PRN Reason: Pain, moderate (4-7)


   Last Admin: 04/02/16 09:07 Dose:  600 mg


Levetiracetam (Keppra)  500 mg PO BID [facility]


   Last Admin: 04/03/16 09:19 Dose:  500 mg


Lorazepam (Ativan)  1 mg IM Q4 PRN


   PRN Reason: Agitation


   Last Admin: 04/02/16 15:10 Dose:  1 mg


Metoprolol Tartrate (Lopressor)  50 mg PO Q12 [facility]


   Last Admin: 04/03/16 09:19 Dose:  50 mg


Nicotine (Nicoderm Cq)  1 patch TD DAILY [facility]


   Last Admin: 04/03/16 09:20 Dose:  1 patch


Quetiapine Fumarate (Seroquel)  25 mg PO DAILY@1700 [facility]


   Last Admin: 04/02/16 16:38 Dose:  25 mg











- Labs


Labs: 


 





 03/19/16 05:30 





 03/19/16 05:30 





 











PT  11.2 SECONDS (9.4-12.0)   12/14/15  05:20    


 


INR  1.05  (0.89-1.20)   12/14/15  05:20    


 


APTT  36.2 SECONDS (23.1-32.0)  H  12/14/15  05:20    














- Constitutional


Appears: Non-toxic, Chronically Ill





- Head Exam


Head Exam: NORMOCEPHALIC





- Eye Exam


Eye Exam: absent: Scleral icterus





- ENT Exam


ENT Exam: Mucous Membranes Dry





- Neck Exam


Neck Exam: absent: Lymphadenopathy, Thyromegaly





- Respiratory Exam


Respiratory Exam: Decreased Breath Sounds, Clear to Ausculation Bilateral





- Cardiovascular Exam


Cardiovascular Exam: REGULAR RHYTHM, +S1, +S2





- GI/Abdominal Exam


GI & Abdominal Exam: Distended, Soft.  absent: Tenderness





- Rectal Exam


Rectal Exam: Deferred





-  Exam


 Exam: NORMAL INSPECTION





- Extremities Exam


Extremities Exam: absent: Calf Tenderness, Pedal Edema





- Back Exam


Back Exam: absent: CVA tenderness (L), CVA tenderness (R), paraspinal tenderness





- Neurological Exam


Neurological Exam: Alert, Awake, Oriented x3


Neuro motor strength exam: Left Upper Extremity: 5, Right Upper Extremity: 5, 

Left Lower Extremity: 5, Right Lower Extremity: 5





Assessment and Plan


(1) Agitation


Status: Acute





(2) History of seizure


Status: Chronic





(3) NSTEMI (non-ST elevated myocardial infarction)


Status: Acute

## 2016-04-03 NOTE — CP.PCM.PN
Subjective





- Date & Time of Evaluation


Date of Evaluation: 04/03/16


Time of Evaluation: 13:37





- Subjective


Subjective: 


pt offers no complaints. no f/c, n/v/d. 


pt required sedation last night and was restrained for agitation andagressive 

bheaviors. pt is calm and cooperative now


ros negative except intermittent agitation





Objective





- Vital Signs/Intake and Output


Vital Signs (last 24 hours): 


 











Temp Pulse Resp BP Pulse Ox


 


 97.3 F L  63   18   116/77   100 


 


 04/03/16 07:51  04/03/16 07:51  04/03/16 07:51  04/03/16 09:19  04/03/16 07:51











- Medications


Medications: 


 Current Medications





Aspirin (Ecotrin)  81 mg PO DAILY [facility]


   Last Admin: 04/03/16 09:20 Dose:  81 mg


Atorvastatin Calcium (Lipitor)  40 mg PO DAILY [facility]


   Last Admin: 04/03/16 09:19 Dose:  40 mg


Divalproex Sodium (Depakote Dr(*Bid*))  500 mg PO BID [facility]


   Last Admin: 04/03/16 09:20 Dose:  500 mg


Enalapril Maleate (Vasotec)  10 mg PO DAILY [facility]


   Last Admin: 04/03/16 09:20 Dose:  10 mg


Famotidine (Pepcid)  20 mg PO BID [facility]


   Last Admin: 04/03/16 09:20 Dose:  20 mg


Haloperidol Lactate (Haldol)  5 mg IM Q6 PRN


   PRN Reason: Agitation


   Last Admin: 04/02/16 20:25 Dose:  5 mg


Ibuprofen (Motrin Tab)  600 mg PO Q8 PRN


   PRN Reason: Pain, moderate (4-7)


   Last Admin: 04/02/16 09:07 Dose:  600 mg


Levetiracetam (Keppra)  500 mg PO BID [facility]


   Last Admin: 04/03/16 09:19 Dose:  500 mg


Lorazepam (Ativan)  1 mg IM Q4 PRN


   PRN Reason: Agitation


   Last Admin: 04/02/16 15:10 Dose:  1 mg


Metoprolol Tartrate (Lopressor)  50 mg PO Q12 [facility]


   Last Admin: 04/03/16 09:19 Dose:  50 mg


Nicotine (Nicoderm Cq)  1 patch TD DAILY [facility]


   Last Admin: 04/03/16 09:20 Dose:  1 patch


Quetiapine Fumarate (Seroquel)  25 mg PO DAILY@1700 [facility]


   Last Admin: 04/02/16 16:38 Dose:  25 mg











- Labs


Labs: 


 





 03/19/16 05:30 





 03/19/16 05:30 





 











PT  11.2 SECONDS (9.4-12.0)   12/14/15  05:20    


 


INR  1.05  (0.89-1.20)   12/14/15  05:20    


 


APTT  36.2 SECONDS (23.1-32.0)  H  12/14/15  05:20    














- Constitutional


Appears: Well, Non-toxic, No Acute Distress





- Head Exam


Head Exam: ATRAUMATIC, NORMAL INSPECTION, NORMOCEPHALIC





- Eye Exam


Eye Exam: EOMI, Normal appearance, PERRL


Pupil Exam: NORMAL ACCOMODATION, PERRL





- ENT Exam


ENT Exam: Mucous Membranes Moist, Normal Exam





- Neck Exam


Neck Exam: Full ROM, Normal Inspection.  absent: Lymphadenopathy





- Respiratory Exam


Respiratory Exam: Clear to Ausculation Bilateral, NORMAL BREATHING PATTERN





- Cardiovascular Exam


Cardiovascular Exam: REGULAR RHYTHM, +S1, +S2.  absent: Murmur





- GI/Abdominal Exam


GI & Abdominal Exam: Soft, Normal Bowel Sounds.  absent: Tenderness





- Extremities Exam


Extremities Exam: Full ROM, Normal Capillary Refill, Normal Inspection.  absent

: Joint Swelling, Pedal Edema





- Back Exam


Back Exam: NORMAL INSPECTION





- Neurological Exam


Neurological Exam: Alert, Awake, CN II-XII Intact, Normal Gait, Oriented x3





- Psychiatric Exam


Psychiatric exam: Normal Affect, Normal Mood





- Skin


Skin Exam: Dry, Intact, Normal Color, Warm





Assessment and Plan


(1) Scrotal edema


Status: Chronic





(2) DVT prophylaxis


Assessment & Plan: 


scd and ae hose


ambulation


Status: Acute





(3) Agitation


Assessment & Plan: 


seroquel/depkator


ativan/haldol prn


psych reeval


Status: Acute





(4) CAD (coronary artery disease)


Assessment & Plan: 


cont all meds


no further cp


Status: Acute





(5) Smoking


Assessment & Plan: 


nicoderm


Status: Chronic





(6) Low back pain


Assessment & Plan: 


ibuprofen prn


Status: Acute

## 2016-04-04 RX ADMIN — DIVALPROEX SODIUM SCH MG: 500 TABLET, DELAYED RELEASE ORAL at 08:38

## 2016-04-04 RX ADMIN — DIVALPROEX SODIUM SCH MG: 500 TABLET, DELAYED RELEASE ORAL at 17:04

## 2016-04-04 NOTE — CP.PCM.PN
Subjective





- Date & Time of Evaluation


Date of Evaluation: 04/04/16


Time of Evaluation: 11:10





- Subjective


Subjective: 


pt comfortable in bed, no complaints. no f/c, n/v/d.  


ros negative except intermittent agitation


required sedation for agitation las tnight





Objective





- Vital Signs/Intake and Output


Vital Signs (last 24 hours): 


 











Temp Pulse Resp BP Pulse Ox


 


 97.5 F L  76   20   128/73   97 


 


 04/04/16 07:37  04/04/16 08:39  04/04/16 07:37  04/04/16 08:39  04/04/16 07:37











- Medications


Medications: 


 Current Medications





Aspirin (Ecotrin)  81 mg PO DAILY [facility]


   Last Admin: 04/04/16 08:38 Dose:  81 mg


Atorvastatin Calcium (Lipitor)  40 mg PO DAILY [facility]


   Last Admin: 04/04/16 08:38 Dose:  40 mg


Divalproex Sodium (Depakote Dr(*Bid*))  500 mg PO BID [facility]


   Last Admin: 04/04/16 08:38 Dose:  500 mg


Enalapril Maleate (Vasotec)  10 mg PO DAILY [facility]


   Last Admin: 04/04/16 08:39 Dose:  10 mg


Famotidine (Pepcid)  20 mg PO BID [facility]


   Last Admin: 04/04/16 08:39 Dose:  20 mg


Haloperidol Lactate (Haldol)  5 mg IM Q6 PRN


   PRN Reason: Agitation


   Last Admin: 04/03/16 20:22 Dose:  5 mg


Ibuprofen (Motrin Tab)  600 mg PO Q8 PRN


   PRN Reason: Pain, moderate (4-7)


   Last Admin: 04/02/16 09:07 Dose:  600 mg


Levetiracetam (Keppra)  500 mg PO BID [facility]


   Last Admin: 04/04/16 08:38 Dose:  500 mg


Lorazepam (Ativan)  1 mg IM Q4 PRN


   PRN Reason: Agitation


   Last Admin: 04/03/16 15:54 Dose:  1 mg


Metoprolol Tartrate (Lopressor)  50 mg PO Q12 [facility]


   Last Admin: 04/04/16 08:39 Dose:  50 mg


Nicotine (Nicoderm Cq)  1 patch TD DAILY [facility]


   Last Admin: 04/04/16 08:39 Dose:  1 patch


Quetiapine Fumarate (Seroquel)  25 mg PO DAILY@1700 [facility]


   Last Admin: 04/03/16 16:00 Dose:  25 mg











- Labs


Labs: 


 





 03/19/16 05:30 





 03/19/16 05:30 





 











PT  11.2 SECONDS (9.4-12.0)   12/14/15  05:20    


 


INR  1.05  (0.89-1.20)   12/14/15  05:20    


 


APTT  36.2 SECONDS (23.1-32.0)  H  12/14/15  05:20    














- Constitutional


Appears: Well, Non-toxic, No Acute Distress





- Head Exam


Head Exam: ATRAUMATIC, NORMAL INSPECTION, NORMOCEPHALIC





- Eye Exam


Eye Exam: EOMI, Normal appearance, PERRL


Pupil Exam: NORMAL ACCOMODATION, PERRL





- ENT Exam


ENT Exam: Mucous Membranes Moist, Normal Exam





- Neck Exam


Neck Exam: Full ROM, Normal Inspection.  absent: Lymphadenopathy





- Respiratory Exam


Respiratory Exam: Clear to Ausculation Bilateral, NORMAL BREATHING PATTERN





- Cardiovascular Exam


Cardiovascular Exam: REGULAR RHYTHM, +S1, +S2.  absent: Murmur





- GI/Abdominal Exam


GI & Abdominal Exam: Soft, Normal Bowel Sounds.  absent: Tenderness





- Extremities Exam


Extremities Exam: Full ROM, Normal Capillary Refill, Normal Inspection.  absent

: Joint Swelling, Pedal Edema





- Back Exam


Back Exam: NORMAL INSPECTION





- Neurological Exam


Neurological Exam: Alert, Awake, CN II-XII Intact, Normal Gait, Oriented x3





- Psychiatric Exam


Psychiatric exam: Normal Affect, Normal Mood





- Skin


Skin Exam: Dry, Intact, Normal Color, Warm





Assessment and Plan


(1) Scrotal edema


Status: Chronic





(2) DVT prophylaxis


Assessment & Plan: 


scd nad aehose, ambulation


Status: Acute





(3) Agitation


Assessment & Plan: 


seroquel/depakote


ativan/haldol prn


Status: Acute





(4) CAD (coronary artery disease)


Assessment & Plan: 


cont meds


Status: Acute





(5) Smoking


Assessment & Plan: 


nicoderm


Status: Chronic





(6) Low back pain


Assessment & Plan: 


ibuprofen prn


Status: Acute

## 2016-04-05 RX ADMIN — DIVALPROEX SODIUM SCH MG: 500 TABLET, DELAYED RELEASE ORAL at 16:29

## 2016-04-05 RX ADMIN — DIVALPROEX SODIUM SCH MG: 500 TABLET, DELAYED RELEASE ORAL at 10:05

## 2016-04-05 NOTE — CP.PCM.PN
Subjective





- Date & Time of Evaluation


Date of Evaluation: 04/05/16


Time of Evaluation: 10:09





- Subjective


Subjective: 


pt comofrtable in bed, no distress, no complaints. no f/c, nv/d. calm and 

cooperative at preent


d/c case w/ dr hicks


ros negative except inermittent agitation





Objective





- Vital Signs/Intake and Output


Vital Signs (last 24 hours): 


 











Temp Pulse Resp BP Pulse Ox


 


 97.1 F L  65   20   110/69   98 


 


 04/05/16 08:47  04/05/16 10:06  04/05/16 08:47  04/05/16 10:06  04/05/16 08:47











- Medications


Medications: 


 Current Medications





Aspirin (Ecotrin)  81 mg PO DAILY [facility]


   Last Admin: 04/05/16 10:05 Dose:  81 mg


Atorvastatin Calcium (Lipitor)  40 mg PO DAILY [facility]


   Last Admin: 04/05/16 10:06 Dose:  40 mg


Divalproex Sodium (Depakote Dr(*Bid*))  500 mg PO BID [facility]


   Last Admin: 04/05/16 10:05 Dose:  500 mg


Enalapril Maleate (Vasotec)  10 mg PO DAILY [facility]


   Last Admin: 04/05/16 10:07 Dose:  10 mg


Famotidine (Pepcid)  20 mg PO BID [facility]


   Last Admin: 04/05/16 10:05 Dose:  20 mg


Haloperidol Lactate (Haldol)  5 mg IM Q6 PRN


   PRN Reason: Agitation


   Last Admin: 04/03/16 20:22 Dose:  5 mg


Ibuprofen (Motrin Tab)  600 mg PO Q8 PRN


   PRN Reason: Pain, moderate (4-7)


   Last Admin: 04/02/16 09:07 Dose:  600 mg


Levetiracetam (Keppra)  500 mg PO BID [facility]


   Last Admin: 04/05/16 10:05 Dose:  500 mg


Lorazepam (Ativan)  1 mg IM Q4 PRN


   PRN Reason: Agitation


   Last Admin: 04/04/16 17:13 Dose:  1 mg


Metoprolol Tartrate (Lopressor)  50 mg PO Q12 [facility]


   Last Admin: 04/05/16 10:06 Dose:  50 mg


Nicotine (Nicoderm Cq)  1 patch TD DAILY [facility]


   Last Admin: 04/05/16 10:06 Dose:  1 patch


Quetiapine Fumarate (Seroquel)  25 mg PO DAILY@1700 [facility]


   Last Admin: 04/04/16 17:04 Dose:  25 mg











- Labs


Labs: 


 





 03/19/16 05:30 





 03/19/16 05:30 





 











PT  11.2 SECONDS (9.4-12.0)   12/14/15  05:20    


 


INR  1.05  (0.89-1.20)   12/14/15  05:20    


 


APTT  36.2 SECONDS (23.1-32.0)  H  12/14/15  05:20    














- Constitutional


Appears: Well, Non-toxic, No Acute Distress





- Head Exam


Head Exam: ATRAUMATIC, NORMAL INSPECTION, NORMOCEPHALIC





- Eye Exam


Eye Exam: EOMI, Normal appearance, PERRL


Pupil Exam: NORMAL ACCOMODATION, PERRL





- ENT Exam


ENT Exam: Mucous Membranes Moist, Normal Exam





- Neck Exam


Neck Exam: Full ROM, Normal Inspection.  absent: Lymphadenopathy





- Respiratory Exam


Respiratory Exam: Clear to Ausculation Bilateral, NORMAL BREATHING PATTERN





- Cardiovascular Exam


Cardiovascular Exam: REGULAR RHYTHM, +S1, +S2.  absent: Murmur





- GI/Abdominal Exam


GI & Abdominal Exam: Soft, Normal Bowel Sounds.  absent: Tenderness





- Extremities Exam


Extremities Exam: Full ROM, Normal Capillary Refill, Normal Inspection.  absent

: Joint Swelling, Pedal Edema





- Back Exam


Back Exam: NORMAL INSPECTION





- Neurological Exam


Neurological Exam: Alert, Awake, CN II-XII Intact, Normal Gait, Oriented x3





- Psychiatric Exam


Psychiatric exam: Normal Affect, Normal Mood





- Skin


Skin Exam: Dry, Intact, Normal Color, Warm





Assessment and Plan


(1) DVT prophylaxis


Assessment & Plan: 


scd nad ae hose, ambulation


Status: Acute





(2) CAD (coronary artery disease)


Assessment & Plan: 


cont all meds


Status: Acute





(3) Smoking


Assessment & Plan: 


nicoderm


Status: Chronic





(4) Low back pain


Assessment & Plan: 


ibuprofen prn


Status: Acute





(5) Agitation


Assessment & Plan: 


ativan/haldol prn


seroquel/depakote


Status: Acute





(6) Scrotal edema


Status: Chronic

## 2016-04-06 RX ADMIN — DIVALPROEX SODIUM SCH MG: 500 TABLET, DELAYED RELEASE ORAL at 16:29

## 2016-04-06 RX ADMIN — DIVALPROEX SODIUM SCH MG: 500 TABLET, DELAYED RELEASE ORAL at 10:18

## 2016-04-06 NOTE — CP.PCM.PN
Subjective





- Date & Time of Evaluation


Date of Evaluation: 04/06/16


Time of Evaluation: 09:54





- Subjective


Subjective: 


no complaints. no f/c, n/v/d.


still getting agitated in pm


ros negative except agitation





Objective





- Vital Signs/Intake and Output


Vital Signs (last 24 hours): 


 











Temp Pulse Resp BP Pulse Ox


 


 97.4 F L  72   20   120/82   94 L


 


 04/06/16 07:35  04/06/16 07:35  04/06/16 07:35  04/06/16 07:35  04/06/16 07:35











- Medications


Medications: 


 Current Medications





Aspirin (Ecotrin)  81 mg PO DAILY [facility]


   Last Admin: 04/05/16 10:05 Dose:  81 mg


Atorvastatin Calcium (Lipitor)  40 mg PO DAILY [facility]


   Last Admin: 04/05/16 10:06 Dose:  40 mg


Divalproex Sodium (Depakote Dr(*Bid*))  500 mg PO BID [facility]


   Last Admin: 04/05/16 16:29 Dose:  500 mg


Enalapril Maleate (Vasotec)  10 mg PO DAILY [facility]


   Last Admin: 04/05/16 10:07 Dose:  10 mg


Famotidine (Pepcid)  20 mg PO BID [facility]


   Last Admin: 04/05/16 16:30 Dose:  20 mg


Haloperidol Lactate (Haldol)  5 mg IM Q6 PRN


   PRN Reason: Agitation


   Last Admin: 04/05/16 19:43 Dose:  5 mg


Ibuprofen (Motrin Tab)  600 mg PO Q8 PRN


   PRN Reason: Pain, moderate (4-7)


   Last Admin: 04/02/16 09:07 Dose:  600 mg


Levetiracetam (Keppra)  500 mg PO BID [facility]


   Last Admin: 04/05/16 16:30 Dose:  500 mg


Lorazepam (Ativan)  1 mg IM Q4 PRN


   PRN Reason: Agitation


   Last Admin: 04/05/16 22:27 Dose:  1 mg


Metoprolol Tartrate (Lopressor)  50 mg PO Q12 [facility]


   Last Admin: 04/05/16 20:51 Dose:  50 mg


Nicotine (Nicoderm Cq)  1 patch TD DAILY [facility]


   Last Admin: 04/05/16 10:06 Dose:  1 patch


Quetiapine Fumarate (Seroquel)  25 mg PO DAILY@1700 [facility]


   Last Admin: 04/05/16 16:30 Dose:  25 mg











- Labs


Labs: 


 





 03/19/16 05:30 





 03/19/16 05:30 





 











PT  11.2 SECONDS (9.4-12.0)   12/14/15  05:20    


 


INR  1.05  (0.89-1.20)   12/14/15  05:20    


 


APTT  36.2 SECONDS (23.1-32.0)  H  12/14/15  05:20    














- Constitutional


Appears: Well, Non-toxic, No Acute Distress





- Head Exam


Head Exam: ATRAUMATIC, NORMAL INSPECTION, NORMOCEPHALIC





- Eye Exam


Eye Exam: EOMI, Normal appearance, PERRL


Pupil Exam: NORMAL ACCOMODATION, PERRL





- ENT Exam


ENT Exam: Mucous Membranes Moist, Normal Exam





- Neck Exam


Neck Exam: Full ROM, Normal Inspection.  absent: Lymphadenopathy





- Respiratory Exam


Respiratory Exam: Clear to Ausculation Bilateral, NORMAL BREATHING PATTERN





- Cardiovascular Exam


Cardiovascular Exam: REGULAR RHYTHM, +S1, +S2.  absent: Murmur





- GI/Abdominal Exam


GI & Abdominal Exam: Soft, Normal Bowel Sounds.  absent: Tenderness





- Extremities Exam


Extremities Exam: Full ROM, Normal Capillary Refill, Normal Inspection.  absent

: Joint Swelling, Pedal Edema





- Back Exam


Back Exam: NORMAL INSPECTION





- Neurological Exam


Neurological Exam: Alert, Awake, CN II-XII Intact, Normal Gait, Oriented x3





- Psychiatric Exam


Psychiatric exam: Normal Affect, Normal Mood





- Skin


Skin Exam: Dry, Intact, Normal Color, Warm





Assessment and Plan


(1) DVT prophylaxis


Assessment & Plan: 


scd and ae hose


ambulation


Status: Acute





(2) CAD (coronary artery disease)


Assessment & Plan: 


cont meds


Status: Acute





(3) Smoking


Assessment & Plan: 


nicoderm


Status: Chronic





(4) Low back pain


Assessment & Plan: 


ibuprofen prn


Status: Acute





(5) Agitation


Assessment & Plan: 


depakote/seroquel


ativan/hladol prn


Status: Acute





(6) Scrotal edema


Status: Chronic

## 2016-04-07 RX ADMIN — DIVALPROEX SODIUM SCH MG: 500 TABLET, DELAYED RELEASE ORAL at 09:23

## 2016-04-07 RX ADMIN — DIVALPROEX SODIUM SCH MG: 500 TABLET, DELAYED RELEASE ORAL at 16:29

## 2016-04-07 NOTE — CP.PCM.PN
Subjective





- Date & Time of Evaluation


Date of Evaluation: 04/07/16


Time of Evaluation: 10:45





- Subjective


Subjective: 


pt calm and cooperative. no f/c, n/v/d. less agitation


ros negative except intermittent agitation





Objective





- Vital Signs/Intake and Output


Vital Signs (last 24 hours): 


 











Temp Pulse Resp BP Pulse Ox


 


 97.2 F L  94 H  18   115/64   97 


 


 04/07/16 08:19  04/07/16 10:13  04/07/16 08:19  04/07/16 10:13  04/07/16 08:19











- Medications


Medications: 


 Current Medications





Aspirin (Ecotrin)  81 mg PO DAILY [facility]


   Last Admin: 04/07/16 09:25 Dose:  81 mg


Atorvastatin Calcium (Lipitor)  40 mg PO DAILY [facility]


   Last Admin: 04/07/16 09:24 Dose:  40 mg


Divalproex Sodium (Depakote Dr(*Bid*))  500 mg PO BID [facility]


   Last Admin: 04/07/16 09:23 Dose:  500 mg


Enalapril Maleate (Vasotec)  10 mg PO DAILY [facility]


   Last Admin: 04/07/16 09:25 Dose:  10 mg


Famotidine (Pepcid)  20 mg PO BID [facility]


   Last Admin: 04/07/16 09:23 Dose:  20 mg


Haloperidol Lactate (Haldol)  5 mg IM Q6 PRN


   PRN Reason: Agitation


   Last Admin: 04/05/16 19:43 Dose:  5 mg


Ibuprofen (Motrin Tab)  600 mg PO Q8 PRN


   PRN Reason: Pain, moderate (4-7)


   Last Admin: 04/02/16 09:07 Dose:  600 mg


Levetiracetam (Keppra)  500 mg PO BID [facility]


   Last Admin: 04/07/16 09:23 Dose:  500 mg


Lorazepam (Ativan)  1 mg IM Q4 PRN


   PRN Reason: Agitation


   Last Admin: 04/05/16 22:27 Dose:  1 mg


Metoprolol Tartrate (Lopressor)  50 mg PO Q12 [facility]


   Last Admin: 04/07/16 10:13 Dose:  50 mg


Nicotine (Nicoderm Cq)  1 patch TD DAILY [facility]


   Last Admin: 04/07/16 09:25 Dose:  1 patch


Quetiapine Fumarate (Seroquel)  25 mg PO DAILY@1700 [facility]


   Last Admin: 04/06/16 16:27 Dose:  25 mg











- Labs


Labs: 


 





 03/19/16 05:30 





 03/19/16 05:30 





 











PT  11.2 SECONDS (9.4-12.0)   12/14/15  05:20    


 


INR  1.05  (0.89-1.20)   12/14/15  05:20    


 


APTT  36.2 SECONDS (23.1-32.0)  H  12/14/15  05:20    














- Constitutional


Appears: Well, Non-toxic, No Acute Distress





- Head Exam


Head Exam: ATRAUMATIC, NORMAL INSPECTION, NORMOCEPHALIC





- Eye Exam


Eye Exam: EOMI, Normal appearance, PERRL


Pupil Exam: NORMAL ACCOMODATION, PERRL





- ENT Exam


ENT Exam: Mucous Membranes Moist, Normal Exam





- Neck Exam


Neck Exam: Full ROM, Normal Inspection.  absent: Lymphadenopathy





- Respiratory Exam


Respiratory Exam: Clear to Ausculation Bilateral, NORMAL BREATHING PATTERN





- Cardiovascular Exam


Cardiovascular Exam: REGULAR RHYTHM, +S1, +S2.  absent: Murmur





- GI/Abdominal Exam


GI & Abdominal Exam: Soft, Normal Bowel Sounds.  absent: Tenderness





- Extremities Exam


Extremities Exam: Full ROM, Normal Capillary Refill, Normal Inspection.  absent

: Joint Swelling, Pedal Edema





- Back Exam


Back Exam: NORMAL INSPECTION





- Neurological Exam


Neurological Exam: Alert, Awake, CN II-XII Intact, Normal Gait, Oriented x3





- Psychiatric Exam


Psychiatric exam: Normal Affect, Normal Mood





- Skin


Skin Exam: Dry, Intact, Normal Color, Warm





Assessment and Plan


(1) DVT prophylaxis


Assessment & Plan: 


scd adn ae hose, ambulation


Status: Acute





(2) CAD (coronary artery disease)


Assessment & Plan: 


cont meds


Status: Acute





(3) Smoking


Assessment & Plan: 


nicoderm


Status: Chronic





(4) Low back pain


Assessment & Plan: 


ibuprofen prn


Status: Acute





(5) Agitation


Assessment & Plan: 


seroquel/depakote


ativan/haldol prn


Status: Acute





(6) Scrotal edema


Status: Chronic

## 2016-04-08 RX ADMIN — DIVALPROEX SODIUM SCH MG: 500 TABLET, DELAYED RELEASE ORAL at 16:09

## 2016-04-08 RX ADMIN — DIVALPROEX SODIUM SCH MG: 500 TABLET, DELAYED RELEASE ORAL at 08:49

## 2016-04-08 NOTE — CP.PCM.PN
Subjective





- Date & Time of Evaluation


Date of Evaluation: 04/08/16


Time of Evaluation: 09:57





- Subjective


Subjective: 


pt comfortable in chair, no complaints. no f/c, n/v/d. no further agitation


ros negative





Objective





- Vital Signs/Intake and Output


Vital Signs (last 24 hours): 


 











Temp Pulse Resp BP Pulse Ox


 


 97.7 F   86   18   125/87   98 


 


 04/08/16 09:16  04/08/16 09:16  04/08/16 09:16  04/08/16 09:16  04/08/16 09:16











- Medications


Medications: 


 Current Medications





Aspirin (Ecotrin)  81 mg PO DAILY [facility]


   Last Admin: 04/08/16 08:50 Dose:  81 mg


Atorvastatin Calcium (Lipitor)  40 mg PO DAILY [facility]


   Last Admin: 04/08/16 08:50 Dose:  40 mg


Divalproex Sodium (Depakote Dr(*Bid*))  500 mg PO BID [facility]


   Last Admin: 04/08/16 08:49 Dose:  500 mg


Enalapril Maleate (Vasotec)  10 mg PO DAILY [facility]


   Last Admin: 04/08/16 08:55 Dose:  10 mg


Famotidine (Pepcid)  20 mg PO BID [facility]


   Last Admin: 04/08/16 08:50 Dose:  20 mg


Haloperidol Lactate (Haldol)  5 mg IM Q6 PRN


   PRN Reason: Agitation


   Last Admin: 04/05/16 19:43 Dose:  5 mg


Ibuprofen (Motrin Tab)  600 mg PO Q8 PRN


   PRN Reason: Pain, moderate (4-7)


   Last Admin: 04/02/16 09:07 Dose:  600 mg


Levetiracetam (Keppra)  500 mg PO BID [facility]


   Last Admin: 04/08/16 08:50 Dose:  500 mg


Lorazepam (Ativan)  1 mg IM Q4 PRN


   PRN Reason: Agitation


   Last Admin: 04/05/16 22:27 Dose:  1 mg


Metoprolol Tartrate (Lopressor)  50 mg PO Q12 [facility]


   Last Admin: 04/08/16 08:50 Dose:  50 mg


Nicotine (Nicoderm Cq)  1 patch TD DAILY [facility]


   Last Admin: 04/08/16 08:55 Dose:  1 patch


Quetiapine Fumarate (Seroquel)  25 mg PO DAILY@1700 [facility]


   Last Admin: 04/07/16 16:30 Dose:  25 mg











- Labs


Labs: 


 





 03/19/16 05:30 





 03/19/16 05:30 





 











PT  11.2 SECONDS (9.4-12.0)   12/14/15  05:20    


 


INR  1.05  (0.89-1.20)   12/14/15  05:20    


 


APTT  36.2 SECONDS (23.1-32.0)  H  12/14/15  05:20    














- Constitutional


Appears: Well, Non-toxic, No Acute Distress





- Head Exam


Head Exam: ATRAUMATIC, NORMAL INSPECTION, NORMOCEPHALIC





- Eye Exam


Eye Exam: EOMI, Normal appearance, PERRL


Pupil Exam: NORMAL ACCOMODATION, PERRL





- ENT Exam


ENT Exam: Mucous Membranes Moist, Normal Exam





- Neck Exam


Neck Exam: Full ROM, Normal Inspection.  absent: Lymphadenopathy





- Respiratory Exam


Respiratory Exam: Clear to Ausculation Bilateral, NORMAL BREATHING PATTERN





- Cardiovascular Exam


Cardiovascular Exam: REGULAR RHYTHM, +S1, +S2.  absent: Murmur





- GI/Abdominal Exam


GI & Abdominal Exam: Soft, Normal Bowel Sounds.  absent: Tenderness





- Extremities Exam


Extremities Exam: Full ROM, Normal Capillary Refill, Normal Inspection.  absent

: Joint Swelling, Pedal Edema





- Back Exam


Back Exam: NORMAL INSPECTION





- Neurological Exam


Neurological Exam: Alert, Awake, CN II-XII Intact, Normal Gait, Oriented x3





- Psychiatric Exam


Psychiatric exam: Normal Affect, Normal Mood





- Skin


Skin Exam: Dry, Intact, Normal Color, Warm





Assessment and Plan


(1) DVT prophylaxis


Assessment & Plan: 


scd nad ae hose


ambulation


Status: Acute





(2) CAD (coronary artery disease)


Assessment & Plan: 


cont meds


Status: Acute





(3) Smoking


Assessment & Plan: 


nicoderm


Status: Chronic





(4) Low back pain


Assessment & Plan: 


ibuprofen prn


Status: Acute





(5) Agitation


Assessment & Plan: 


seroquel/depakote


ativan/haldol prn


Status: Acute





(6) Scrotal edema


Status: Chronic

## 2016-04-09 RX ADMIN — DIVALPROEX SODIUM SCH MG: 500 TABLET, DELAYED RELEASE ORAL at 09:48

## 2016-04-09 RX ADMIN — DIVALPROEX SODIUM SCH MG: 500 TABLET, DELAYED RELEASE ORAL at 16:29

## 2016-04-09 NOTE — CP.PCM.PN
Subjective





- Date & Time of Evaluation


Date of Evaluation: 04/09/16


Time of Evaluation: 08:14





- Subjective


Subjective: 


pt comfortable in bed, no complaints. no f/c, n/v/d.


calm adn cooperative


ros negative





Objective





- Vital Signs/Intake and Output


Vital Signs (last 24 hours): 


 











Temp Pulse Resp BP Pulse Ox


 


 97.5 F L  83   20   114/68   100 


 


 04/08/16 15:40  04/08/16 21:25  04/08/16 15:40  04/08/16 21:25  04/08/16 15:40











- Medications


Medications: 


 Current Medications





Aspirin (Ecotrin)  81 mg PO DAILY [facility]


   Last Admin: 04/08/16 08:50 Dose:  81 mg


Atorvastatin Calcium (Lipitor)  40 mg PO DAILY [facility]


   Last Admin: 04/08/16 08:50 Dose:  40 mg


Divalproex Sodium (Depakote Dr(*Bid*))  500 mg PO BID [facility]


   Last Admin: 04/08/16 16:09 Dose:  500 mg


Enalapril Maleate (Vasotec)  10 mg PO DAILY [facility]


   Last Admin: 04/08/16 08:55 Dose:  10 mg


Famotidine (Pepcid)  20 mg PO BID [facility]


   Last Admin: 04/08/16 19:00 Dose:  Not Given


Haloperidol Lactate (Haldol)  5 mg IM Q6 PRN


   PRN Reason: Agitation


   Last Admin: 04/05/16 19:43 Dose:  5 mg


Ibuprofen (Motrin Tab)  600 mg PO Q8 PRN


   PRN Reason: Pain, moderate (4-7)


   Last Admin: 04/02/16 09:07 Dose:  600 mg


Levetiracetam (Keppra)  500 mg PO BID [facility]


   Last Admin: 04/08/16 16:10 Dose:  500 mg


Lorazepam (Ativan)  1 mg IM Q4 PRN


   PRN Reason: Agitation


   Last Admin: 04/05/16 22:27 Dose:  1 mg


Metoprolol Tartrate (Lopressor)  50 mg PO Q12 [facility]


   Last Admin: 04/08/16 21:25 Dose:  50 mg


Nicotine (Nicoderm Cq)  1 patch TD DAILY [facility]


   Last Admin: 04/08/16 08:55 Dose:  1 patch


Quetiapine Fumarate (Seroquel)  25 mg PO DAILY@1700 [facility]


   Last Admin: 04/08/16 16:10 Dose:  25 mg











- Labs


Labs: 


 





 03/19/16 05:30 





 03/19/16 05:30 





 











PT  11.2 SECONDS (9.4-12.0)   12/14/15  05:20    


 


INR  1.05  (0.89-1.20)   12/14/15  05:20    


 


APTT  36.2 SECONDS (23.1-32.0)  H  12/14/15  05:20    














- Constitutional


Appears: Well, Non-toxic, No Acute Distress





- Head Exam


Head Exam: ATRAUMATIC, NORMAL INSPECTION, NORMOCEPHALIC





- Eye Exam


Eye Exam: EOMI, Normal appearance, PERRL


Pupil Exam: NORMAL ACCOMODATION, PERRL





- ENT Exam


ENT Exam: Mucous Membranes Moist, Normal Exam





- Neck Exam


Neck Exam: Full ROM, Normal Inspection.  absent: Lymphadenopathy





- Respiratory Exam


Respiratory Exam: Clear to Ausculation Bilateral, NORMAL BREATHING PATTERN





- Cardiovascular Exam


Cardiovascular Exam: REGULAR RHYTHM, +S1, +S2.  absent: Murmur





- GI/Abdominal Exam


GI & Abdominal Exam: Soft, Normal Bowel Sounds.  absent: Tenderness





- Extremities Exam


Extremities Exam: Full ROM, Normal Capillary Refill, Normal Inspection.  absent

: Joint Swelling, Pedal Edema





- Back Exam


Back Exam: NORMAL INSPECTION





- Neurological Exam


Neurological Exam: Alert, Awake, CN II-XII Intact, Normal Gait, Oriented x3





- Psychiatric Exam


Psychiatric exam: Normal Affect, Normal Mood





- Skin


Skin Exam: Dry, Intact, Normal Color, Warm





Assessment and Plan


(1) DVT prophylaxis


Status: Acute





(2) CAD (coronary artery disease)


Status: Acute





(3) Smoking


Status: Chronic





(4) Low back pain


Status: Acute





(5) Agitation


Status: Acute





(6) Scrotal edema


Status: Chronic








- Assessment and Plan (Free Text)


Assessment: 


Assessment and Plan


(1) DVT prophylaxis


Assessment & Plan: 


scd nad ae hose


ambulation


Status: Acute





(2) CAD (coronary artery disease)


Assessment & Plan: 


cont meds


Status: Acute





(3) Smoking


Assessment & Plan: 


nicoderm


Status: Chronic





(4) Low back pain


Assessment & Plan: 


ibuprofen prn


Status: Acute





(5) Agitation


Assessment & Plan: 


seroquel/depakote


ativan/haldol prn


Status: Acute





(6) Scrotal edema


Status: Chronic

## 2016-04-10 RX ADMIN — DIVALPROEX SODIUM SCH MG: 500 TABLET, DELAYED RELEASE ORAL at 09:04

## 2016-04-10 RX ADMIN — DIVALPROEX SODIUM SCH MG: 500 TABLET, DELAYED RELEASE ORAL at 17:00

## 2016-04-10 NOTE — CP.PCM.PN
Subjective





- Date & Time of Evaluation


Date of Evaluation: 04/10/16


Time of Evaluation: 13:19





- Subjective


Subjective: 


pt doingw ell, no complaints. offered. no fever, chills, n/v/d.  no agitation


ros negative





Objective





- Vital Signs/Intake and Output


Vital Signs (last 24 hours): 


 











Temp Pulse Resp BP Pulse Ox


 


 97.5 F L  74   20   140/65   98 


 


 04/10/16 08:55  04/10/16 09:06  04/10/16 08:55  04/10/16 09:06  04/10/16 08:55











- Medications


Medications: 


 Current Medications





Aspirin (Ecotrin)  81 mg PO DAILY [facility]


   Last Admin: 04/10/16 09:05 Dose:  81 mg


Atorvastatin Calcium (Lipitor)  40 mg PO DAILY [facility]


   Last Admin: 04/10/16 09:06 Dose:  40 mg


Divalproex Sodium (Depakote Dr(*Bid*))  500 mg PO BID [facility]


   Last Admin: 04/10/16 09:04 Dose:  500 mg


Enalapril Maleate (Vasotec)  10 mg PO DAILY [facility]


   Last Admin: 04/10/16 09:07 Dose:  10 mg


Famotidine (Pepcid)  20 mg PO BID [facility]


   Last Admin: 04/10/16 09:07 Dose:  20 mg


Haloperidol Lactate (Haldol)  5 mg IM Q6 PRN


   PRN Reason: Agitation


   Last Admin: 04/05/16 19:43 Dose:  5 mg


Ibuprofen (Motrin Tab)  600 mg PO Q8 PRN


   PRN Reason: Pain, moderate (4-7)


   Last Admin: 04/02/16 09:07 Dose:  600 mg


Levetiracetam (Keppra)  500 mg PO BID [facility]


   Last Admin: 04/10/16 09:05 Dose:  500 mg


Lorazepam (Ativan)  1 mg IM Q4 PRN


   PRN Reason: Agitation


   Last Admin: 04/05/16 22:27 Dose:  1 mg


Metoprolol Tartrate (Lopressor)  50 mg PO Q12 [facility]


   Last Admin: 04/10/16 09:06 Dose:  50 mg


Nicotine (Nicoderm Cq)  1 patch TD DAILY [facility]


   Last Admin: 04/09/16 09:51 Dose:  1 patch


Quetiapine Fumarate (Seroquel)  25 mg PO DAILY@1700 [facility]


   Last Admin: 04/09/16 16:30 Dose:  25 mg











- Labs


Labs: 


 





 03/19/16 05:30 





 03/19/16 05:30 





 











PT  11.2 SECONDS (9.4-12.0)   12/14/15  05:20    


 


INR  1.05  (0.89-1.20)   12/14/15  05:20    


 


APTT  36.2 SECONDS (23.1-32.0)  H  12/14/15  05:20    














- Constitutional


Appears: Well, Non-toxic, No Acute Distress





- Head Exam


Head Exam: ATRAUMATIC, NORMAL INSPECTION, NORMOCEPHALIC





- Eye Exam


Eye Exam: EOMI, Normal appearance, PERRL


Pupil Exam: NORMAL ACCOMODATION, PERRL





- ENT Exam


ENT Exam: Mucous Membranes Moist, Normal Exam





- Neck Exam


Neck Exam: Full ROM, Normal Inspection.  absent: Lymphadenopathy





- Respiratory Exam


Respiratory Exam: Clear to Ausculation Bilateral, NORMAL BREATHING PATTERN





- Cardiovascular Exam


Cardiovascular Exam: REGULAR RHYTHM, +S1, +S2.  absent: Murmur





- GI/Abdominal Exam


GI & Abdominal Exam: Soft, Normal Bowel Sounds.  absent: Tenderness





- Extremities Exam


Extremities Exam: Full ROM, Normal Capillary Refill, Normal Inspection.  absent

: Joint Swelling, Pedal Edema





- Back Exam


Back Exam: NORMAL INSPECTION





- Neurological Exam


Neurological Exam: Alert, Awake, CN II-XII Intact, Normal Gait, Oriented x3





- Psychiatric Exam


Psychiatric exam: Normal Affect, Normal Mood





- Skin


Skin Exam: Dry, Intact, Normal Color, Warm





Assessment and Plan


(1) DVT prophylaxis


Assessment & Plan: 


scd and ae hose


ambulation


Status: Acute





(2) CAD (coronary artery disease)


Assessment & Plan: 


cont meds


Status: Acute





(3) Smoking


Assessment & Plan: 


nicoderm


Status: Chronic





(4) Low back pain


Assessment & Plan: 


ibuprofen prn


Status: Acute





(5) Agitation


Assessment & Plan: 


seroquel/depakote


ativan/haldol prn


Status: Acute





(6) Scrotal edema


Status: Chronic

## 2016-04-11 RX ADMIN — DIVALPROEX SODIUM SCH MG: 500 TABLET, DELAYED RELEASE ORAL at 17:07

## 2016-04-11 RX ADMIN — DIVALPROEX SODIUM SCH MG: 500 TABLET, DELAYED RELEASE ORAL at 09:11

## 2016-04-11 NOTE — CP.PCM.PN
Subjective





- Date & Time of Evaluation


Date of Evaluation: 04/11/16


Time of Evaluation: 07:19





- Subjective


Subjective: 


no complaints no distress. no f/c, n/v/d. no agitation at present


ros negative.


pending social dispo





Objective





- Vital Signs/Intake and Output


Vital Signs (last 24 hours): 


 











Temp Pulse Resp BP Pulse Ox


 


 97.6 F   91 H  20   127/84   99 


 


 04/10/16 17:13  04/10/16 20:52  04/10/16 17:13  04/10/16 20:52  04/10/16 17:13











- Medications


Medications: 


 Current Medications





Aspirin (Ecotrin)  81 mg PO DAILY [facility]


   Last Admin: 04/10/16 09:05 Dose:  81 mg


Atorvastatin Calcium (Lipitor)  40 mg PO DAILY [facility]


   Last Admin: 04/10/16 09:06 Dose:  40 mg


Divalproex Sodium (Depakote Dr(*Bid*))  500 mg PO BID [facility]


   Last Admin: 04/10/16 17:00 Dose:  500 mg


Enalapril Maleate (Vasotec)  10 mg PO DAILY [facility]


   Last Admin: 04/10/16 09:07 Dose:  10 mg


Famotidine (Pepcid)  20 mg PO BID [facility]


   Last Admin: 04/10/16 17:01 Dose:  20 mg


Haloperidol Lactate (Haldol)  5 mg IM Q6 PRN


   PRN Reason: Agitation


   Last Admin: 04/05/16 19:43 Dose:  5 mg


Ibuprofen (Motrin Tab)  600 mg PO Q8 PRN


   PRN Reason: Pain, moderate (4-7)


   Last Admin: 04/02/16 09:07 Dose:  600 mg


Levetiracetam (Keppra)  500 mg PO BID [facility]


   Last Admin: 04/10/16 17:00 Dose:  500 mg


Lorazepam (Ativan)  1 mg IM Q4 PRN


   PRN Reason: Agitation


   Last Admin: 04/05/16 22:27 Dose:  1 mg


Metoprolol Tartrate (Lopressor)  50 mg PO Q12 [facility]


   Last Admin: 04/10/16 20:52 Dose:  50 mg


Nicotine (Nicoderm Cq)  1 patch TD DAILY [facility]


   Last Admin: 04/10/16 10:00 Dose:  1 patch


Quetiapine Fumarate (Seroquel)  25 mg PO DAILY@1700 [facility]


   Last Admin: 04/10/16 17:01 Dose:  25 mg











- Labs


Labs: 


 





 03/19/16 05:30 





 03/19/16 05:30 





 











PT  11.2 SECONDS (9.4-12.0)   12/14/15  05:20    


 


INR  1.05  (0.89-1.20)   12/14/15  05:20    


 


APTT  36.2 SECONDS (23.1-32.0)  H  12/14/15  05:20    














- Constitutional


Appears: Well, Non-toxic, No Acute Distress





- Head Exam


Head Exam: ATRAUMATIC, NORMAL INSPECTION, NORMOCEPHALIC





- Eye Exam


Eye Exam: EOMI, Normal appearance, PERRL


Pupil Exam: NORMAL ACCOMODATION, PERRL





- ENT Exam


ENT Exam: Mucous Membranes Moist, Normal Exam





- Neck Exam


Neck Exam: Full ROM, Normal Inspection.  absent: Lymphadenopathy





- Respiratory Exam


Respiratory Exam: Clear to Ausculation Bilateral, NORMAL BREATHING PATTERN





- Cardiovascular Exam


Cardiovascular Exam: REGULAR RHYTHM, +S1, +S2.  absent: Murmur





- GI/Abdominal Exam


GI & Abdominal Exam: Soft, Normal Bowel Sounds.  absent: Tenderness





-  Exam


Speculum exam: NORMAL SPECULUM EXAM





- Extremities Exam


Extremities Exam: Full ROM, Normal Capillary Refill, Normal Inspection.  absent

: Joint Swelling, Pedal Edema





- Back Exam


Back Exam: NORMAL INSPECTION





- Neurological Exam


Neurological Exam: Alert, Awake, CN II-XII Intact, Normal Gait, Oriented x3





- Psychiatric Exam


Psychiatric exam: Normal Affect, Normal Mood





- Skin


Skin Exam: Dry, Intact, Normal Color, Warm





Assessment and Plan


(1) DVT prophylaxis


Assessment & Plan: 


scd and aehose, ambulation


Status: Acute





(2) CAD (coronary artery disease)


Assessment & Plan: 


cont meds


Status: Acute





(3) Smoking


Assessment & Plan: 


nicoderm


Status: Chronic





(4) Low back pain


Assessment & Plan: 


ibuprofen prn


Status: Acute





(5) Agitation


Assessment & Plan: 


seroquel/depakote


ativan/haldol prn


Status: Acute





(6) Scrotal edema


Status: Chronic

## 2016-04-12 RX ADMIN — DIVALPROEX SODIUM SCH MG: 500 TABLET, DELAYED RELEASE ORAL at 08:52

## 2016-04-12 RX ADMIN — DIVALPROEX SODIUM SCH MG: 500 TABLET, DELAYED RELEASE ORAL at 16:08

## 2016-04-12 NOTE — CP.PCM.PN
Subjective





- Date & Time of Evaluation


Date of Evaluation: 04/12/16


Time of Evaluation: 07:34





- Subjective


Subjective: 


pt comfortable, no distress. no complaints. no f/c, n/v/d


ros negative


no agitation





Objective





- Vital Signs/Intake and Output


Vital Signs (last 24 hours): 


 











Temp Pulse Resp BP Pulse Ox


 


 98.2 F   85   18   134/83   100 


 


 04/11/16 16:49  04/11/16 20:33  04/11/16 16:49  04/11/16 20:33  04/11/16 16:49











- Medications


Medications: 


 Current Medications





Aspirin (Ecotrin)  81 mg PO DAILY [facility]


   Last Admin: 04/11/16 09:12 Dose:  81 mg


Atorvastatin Calcium (Lipitor)  40 mg PO DAILY [facility]


   Last Admin: 04/11/16 09:12 Dose:  40 mg


Divalproex Sodium (Depakote Dr(*Bid*))  500 mg PO BID [facility]


   Last Admin: 04/11/16 17:07 Dose:  500 mg


Enalapril Maleate (Vasotec)  10 mg PO DAILY [facility]


   Last Admin: 04/11/16 09:13 Dose:  10 mg


Famotidine (Pepcid)  20 mg PO BID [facility]


   Last Admin: 04/11/16 17:08 Dose:  20 mg


Haloperidol Lactate (Haldol)  5 mg IM Q6 PRN


   PRN Reason: Agitation


   Last Admin: 04/05/16 19:43 Dose:  5 mg


Ibuprofen (Motrin Tab)  600 mg PO Q8 PRN


   PRN Reason: Pain, moderate (4-7)


   Last Admin: 04/02/16 09:07 Dose:  600 mg


Levetiracetam (Keppra)  500 mg PO BID [facility]


   Last Admin: 04/11/16 17:08 Dose:  500 mg


Lorazepam (Ativan)  1 mg IM Q4 PRN


   PRN Reason: Agitation


   Last Admin: 04/05/16 22:27 Dose:  1 mg


Metoprolol Tartrate (Lopressor)  50 mg PO Q12 [facility]


   Last Admin: 04/11/16 20:33 Dose:  50 mg


Nicotine (Nicoderm Cq)  1 patch TD DAILY [facility]


   Last Admin: 04/11/16 09:13 Dose:  1 patch


Quetiapine Fumarate (Seroquel)  25 mg PO DAILY@1700 [facility]


   Last Admin: 04/11/16 17:08 Dose:  25 mg











- Labs


Labs: 


 





 03/19/16 05:30 





 03/19/16 05:30 





 











PT  11.2 SECONDS (9.4-12.0)   12/14/15  05:20    


 


INR  1.05  (0.89-1.20)   12/14/15  05:20    


 


APTT  36.2 SECONDS (23.1-32.0)  H  12/14/15  05:20    














- Constitutional


Appears: Well, Non-toxic, No Acute Distress





- Head Exam


Head Exam: ATRAUMATIC, NORMAL INSPECTION, NORMOCEPHALIC





- Eye Exam


Eye Exam: EOMI, Normal appearance, PERRL


Pupil Exam: NORMAL ACCOMODATION, PERRL





- ENT Exam


ENT Exam: Mucous Membranes Moist, Normal Exam





- Neck Exam


Neck Exam: Full ROM, Normal Inspection.  absent: Lymphadenopathy





- Respiratory Exam


Respiratory Exam: Clear to Ausculation Bilateral, NORMAL BREATHING PATTERN





- Cardiovascular Exam


Cardiovascular Exam: REGULAR RHYTHM, +S1, +S2.  absent: Murmur





- GI/Abdominal Exam


GI & Abdominal Exam: Soft, Normal Bowel Sounds.  absent: Tenderness





- Rectal Exam


Rectal Exam: NORMAL INSPECTION





- Extremities Exam


Extremities Exam: Full ROM, Normal Capillary Refill, Normal Inspection.  absent

: Joint Swelling, Pedal Edema





- Back Exam


Back Exam: NORMAL INSPECTION





- Neurological Exam


Neurological Exam: Alert, Awake, CN II-XII Intact, Normal Gait, Oriented x3





- Psychiatric Exam


Psychiatric exam: Normal Affect, Normal Mood





- Skin


Skin Exam: Dry, Intact, Normal Color, Warm





Assessment and Plan


(1) DVT prophylaxis


Assessment & Plan: 


scd nad ae hose, ambulation


Status: Acute





(2) CAD (coronary artery disease)


Assessment & Plan: 


cont meds


Status: Acute





(3) Smoking


Assessment & Plan: 


nicoderm


Status: Chronic





(4) Low back pain


Assessment & Plan: 


ibuprofen prn


Status: Acute





(5) Agitation


Assessment & Plan: 


ativan/haldol prn


seroquel/depakote


Status: Acute





(6) Scrotal edema


Status: Chronic

## 2016-04-13 RX ADMIN — DIVALPROEX SODIUM SCH MG: 500 TABLET, DELAYED RELEASE ORAL at 16:02

## 2016-04-13 RX ADMIN — DIVALPROEX SODIUM SCH MG: 500 TABLET, DELAYED RELEASE ORAL at 09:07

## 2016-04-13 NOTE — CP.PCM.PN
Subjective





- Date & Time of Evaluation


Date of Evaluation: 04/13/16


Time of Evaluation: 07:47





- Subjective


Subjective: 


pt comfortable in bed, no distress, no pain. no f/c, n/v/d.


ros negative.





Objective





- Vital Signs/Intake and Output


Vital Signs (last 24 hours): 


 











Temp Pulse Resp BP Pulse Ox


 


 97.3 F L  86   20   129/57 L  93 L


 


 04/13/16 08:34  04/13/16 08:34  04/13/16 08:34  04/13/16 08:34  04/13/16 08:34











- Medications


Medications: 


 Current Medications





Aspirin (Ecotrin)  81 mg PO DAILY [facility]


   Last Admin: 04/12/16 08:52 Dose:  81 mg


Atorvastatin Calcium (Lipitor)  40 mg PO DAILY [facility]


   Last Admin: 04/12/16 08:53 Dose:  40 mg


Divalproex Sodium (Depakote Dr(*Bid*))  500 mg PO BID [facility]


   Last Admin: 04/12/16 16:08 Dose:  500 mg


Enalapril Maleate (Vasotec)  10 mg PO DAILY [facility]


   Last Admin: 04/12/16 08:54 Dose:  10 mg


Famotidine (Pepcid)  20 mg PO BID [facility]


   Last Admin: 04/12/16 16:08 Dose:  20 mg


Haloperidol Lactate (Haldol)  5 mg IM Q6 PRN


   PRN Reason: Agitation


   Last Admin: 04/05/16 19:43 Dose:  5 mg


Ibuprofen (Motrin Tab)  600 mg PO Q8 PRN


   PRN Reason: Pain, moderate (4-7)


   Last Admin: 04/02/16 09:07 Dose:  600 mg


Levetiracetam (Keppra)  500 mg PO BID [facility]


   Last Admin: 04/12/16 16:08 Dose:  500 mg


Lorazepam (Ativan)  1 mg IM Q4 PRN


   PRN Reason: Agitation


   Last Admin: 04/05/16 22:27 Dose:  1 mg


Metoprolol Tartrate (Lopressor)  50 mg PO Q12 [facility]


   Last Admin: 04/12/16 21:16 Dose:  50 mg


Nicotine (Nicoderm Cq)  1 patch TD DAILY [facility]


   Last Admin: 04/12/16 08:53 Dose:  1 patch


Quetiapine Fumarate (Seroquel)  25 mg PO DAILY@1700 [facility]


   Last Admin: 04/12/16 17:36 Dose:  25 mg











- Labs


Labs: 


 





 03/19/16 05:30 





 03/19/16 05:30 





 











PT  11.2 SECONDS (9.4-12.0)   12/14/15  05:20    


 


INR  1.05  (0.89-1.20)   12/14/15  05:20    


 


APTT  36.2 SECONDS (23.1-32.0)  H  12/14/15  05:20    














- Constitutional


Appears: Well, Non-toxic, No Acute Distress





- Head Exam


Head Exam: ATRAUMATIC, NORMAL INSPECTION, NORMOCEPHALIC





- Eye Exam


Eye Exam: EOMI, Normal appearance, PERRL


Pupil Exam: NORMAL ACCOMODATION, PERRL





- ENT Exam


ENT Exam: Mucous Membranes Moist, Normal Exam





- Neck Exam


Neck Exam: Full ROM, Normal Inspection.  absent: Lymphadenopathy





- Respiratory Exam


Respiratory Exam: Clear to Ausculation Bilateral, NORMAL BREATHING PATTERN





- Cardiovascular Exam


Cardiovascular Exam: REGULAR RHYTHM, +S1, +S2.  absent: Murmur





- GI/Abdominal Exam


GI & Abdominal Exam: Soft, Normal Bowel Sounds.  absent: Tenderness





- Rectal Exam


Rectal Exam: NORMAL INSPECTION





- Extremities Exam


Extremities Exam: Full ROM, Normal Capillary Refill, Normal Inspection.  absent

: Joint Swelling, Pedal Edema





- Back Exam


Back Exam: NORMAL INSPECTION





- Neurological Exam


Neurological Exam: Alert, Awake, CN II-XII Intact, Normal Gait, Oriented x3





- Psychiatric Exam


Psychiatric exam: Normal Affect, Normal Mood





- Skin


Skin Exam: Dry, Intact, Normal Color, Warm





Assessment and Plan


(1) DVT prophylaxis


Assessment & Plan: 


scd and ae hose,ambulation


Status: Acute





(2) CAD (coronary artery disease)


Assessment & Plan: 


cont meds


Status: Acute





(3) Smoking


Assessment & Plan: 


nicoderm


Status: Chronic





(4) Low back pain


Assessment & Plan: 


ibuprofen prn


Status: Acute





(5) Agitation


Assessment & Plan: 


seroquel/depakote


ativan/haldol prn


Status: Acute





(6) Scrotal edema


Status: Chronic

## 2016-04-14 RX ADMIN — DIVALPROEX SODIUM SCH MG: 500 TABLET, DELAYED RELEASE ORAL at 09:46

## 2016-04-14 RX ADMIN — DIVALPROEX SODIUM SCH MG: 500 TABLET, DELAYED RELEASE ORAL at 19:21

## 2016-04-14 NOTE — CP.PCM.PN
Subjective





- Date & Time of Evaluation


Date of Evaluation: 04/14/16


Time of Evaluation: 07:29





- Subjective


Subjective: 


pt in no distress, no f/c, n/v/d. no agitation. 


pending ss dispo


ros negative.





Objective





- Vital Signs/Intake and Output


Vital Signs (last 24 hours): 


 











Temp Pulse Resp BP Pulse Ox


 


 98.4 F   76   19   129/91 H  100 


 


 04/14/16 00:40  04/14/16 00:40  04/14/16 00:40  04/14/16 00:40  04/14/16 00:40











- Medications


Medications: 


 Current Medications





Aspirin (Ecotrin)  81 mg PO DAILY [facility]


   Last Admin: 04/13/16 09:08 Dose:  81 mg


Atorvastatin Calcium (Lipitor)  40 mg PO DAILY [facility]


   Last Admin: 04/13/16 09:08 Dose:  40 mg


Divalproex Sodium (Depakote Dr(*Bid*))  500 mg PO BID [facility]


   Last Admin: 04/13/16 16:02 Dose:  500 mg


Enalapril Maleate (Vasotec)  10 mg PO DAILY [facility]


   Last Admin: 04/13/16 09:07 Dose:  10 mg


Famotidine (Pepcid)  20 mg PO BID [facility]


   Last Admin: 04/13/16 16:02 Dose:  20 mg


Haloperidol Lactate (Haldol)  5 mg IM Q6 PRN


   PRN Reason: Agitation


   Last Admin: 04/05/16 19:43 Dose:  5 mg


Ibuprofen (Motrin Tab)  600 mg PO Q8 PRN


   PRN Reason: Pain, moderate (4-7)


   Last Admin: 04/02/16 09:07 Dose:  600 mg


Levetiracetam (Keppra)  500 mg PO BID [facility]


   Last Admin: 04/13/16 16:02 Dose:  500 mg


Lorazepam (Ativan)  1 mg IM Q4 PRN


   PRN Reason: Agitation


   Last Admin: 04/05/16 22:27 Dose:  1 mg


Metoprolol Tartrate (Lopressor)  50 mg PO Q12 [facility]


   Last Admin: 04/13/16 20:48 Dose:  50 mg


Nicotine (Nicoderm Cq)  1 patch TD DAILY [facility]


   Last Admin: 04/13/16 09:07 Dose:  1 patch


Quetiapine Fumarate (Seroquel)  25 mg PO DAILY@1700 [facility]


   Last Admin: 04/13/16 16:02 Dose:  25 mg











- Labs


Labs: 


 





 03/19/16 05:30 





 03/19/16 05:30 





 











PT  11.2 SECONDS (9.4-12.0)   12/14/15  05:20    


 


INR  1.05  (0.89-1.20)   12/14/15  05:20    


 


APTT  36.2 SECONDS (23.1-32.0)  H  12/14/15  05:20    














- Constitutional


Appears: Well, Non-toxic, No Acute Distress





- Head Exam


Head Exam: ATRAUMATIC, NORMAL INSPECTION, NORMOCEPHALIC





- Eye Exam


Eye Exam: EOMI, Normal appearance, PERRL


Pupil Exam: NORMAL ACCOMODATION, PERRL





- ENT Exam


ENT Exam: Mucous Membranes Moist, Normal Exam





- Neck Exam


Neck Exam: Full ROM, Normal Inspection.  absent: Lymphadenopathy





- Respiratory Exam


Respiratory Exam: Clear to Ausculation Bilateral, NORMAL BREATHING PATTERN





- Cardiovascular Exam


Cardiovascular Exam: REGULAR RHYTHM, +S1, +S2.  absent: Murmur





- GI/Abdominal Exam


GI & Abdominal Exam: Soft, Normal Bowel Sounds.  absent: Tenderness





- Extremities Exam


Extremities Exam: Full ROM, Normal Capillary Refill, Normal Inspection.  absent

: Joint Swelling, Pedal Edema





- Back Exam


Back Exam: NORMAL INSPECTION





- Neurological Exam


Neurological Exam: Alert, Awake, CN II-XII Intact, Normal Gait, Oriented x3





- Psychiatric Exam


Psychiatric exam: Normal Affect, Normal Mood





- Skin


Skin Exam: Dry, Intact, Normal Color, Warm





Assessment and Plan


(1) DVT prophylaxis


Assessment & Plan: 


scd and ae hose, ambulation


Status: Acute





(2) CAD (coronary artery disease)


Assessment & Plan: 


cotn meds


Status: Acute





(3) Smoking


Assessment & Plan: 


nicoderm


Status: Chronic





(4) Low back pain


Assessment & Plan: 


ibuprofen prn


Status: Acute





(5) Agitation


Assessment & Plan: 


seroquel/depakote


ativan/haldol prn


Status: Acute





(6) Scrotal edema


Status: Chronic

## 2016-04-15 RX ADMIN — DIVALPROEX SODIUM SCH MG: 500 TABLET, DELAYED RELEASE ORAL at 16:07

## 2016-04-15 RX ADMIN — DIVALPROEX SODIUM SCH MG: 500 TABLET, DELAYED RELEASE ORAL at 08:29

## 2016-04-15 NOTE — CP.PCM.PN
Subjective





- Date & Time of Evaluation


Date of Evaluation: 04/15/16


Time of Evaluation: 10:39





- Subjective


Subjective: 


pt comfortable in bed, no distress. no complaints. nof /c, n/v/s


ros negative


pending ss





Objective





- Vital Signs/Intake and Output


Vital Signs (last 24 hours): 


 











Temp Pulse Resp BP Pulse Ox


 


 97.4 F L  75   20   128/78   99 


 


 04/15/16 07:42  04/15/16 08:29  04/15/16 07:42  04/15/16 08:29  04/15/16 07:42











- Medications


Medications: 


 Current Medications





Aspirin (Ecotrin)  81 mg PO DAILY [facility]


   Last Admin: 04/15/16 08:30 Dose:  81 mg


Atorvastatin Calcium (Lipitor)  40 mg PO DAILY [facility]


   Last Admin: 04/15/16 08:29 Dose:  40 mg


Divalproex Sodium (Depakote Dr(*Bid*))  500 mg PO BID [facility]


   Last Admin: 04/15/16 08:29 Dose:  500 mg


Enalapril Maleate (Vasotec)  10 mg PO DAILY [facility]


   Last Admin: 04/15/16 08:30 Dose:  10 mg


Famotidine (Pepcid)  20 mg PO BID [facility]


   Last Admin: 04/15/16 08:30 Dose:  20 mg


Haloperidol Lactate (Haldol)  5 mg IM Q6 PRN


   PRN Reason: Agitation


   Last Admin: 04/05/16 19:43 Dose:  5 mg


Ibuprofen (Motrin Tab)  600 mg PO Q8 PRN


   PRN Reason: Pain, moderate (4-7)


   Last Admin: 04/02/16 09:07 Dose:  600 mg


Levetiracetam (Keppra)  500 mg PO BID [facility]


   Last Admin: 04/15/16 08:30 Dose:  500 mg


Lorazepam (Ativan)  1 mg IM Q4 PRN


   PRN Reason: Agitation


   Last Admin: 04/05/16 22:27 Dose:  1 mg


Metoprolol Tartrate (Lopressor)  50 mg PO Q12 [facility]


   Last Admin: 04/15/16 08:29 Dose:  50 mg


Nicotine (Nicoderm Cq)  1 patch TD DAILY [facility]


   Last Admin: 04/15/16 08:30 Dose:  1 patch


Quetiapine Fumarate (Seroquel)  25 mg PO DAILY@1700 [facility]


   Last Admin: 04/14/16 16:19 Dose:  25 mg











- Labs


Labs: 


 





 03/19/16 05:30 





 03/19/16 05:30 





 











PT  11.2 SECONDS (9.4-12.0)   12/14/15  05:20    


 


INR  1.05  (0.89-1.20)   12/14/15  05:20    


 


APTT  36.2 SECONDS (23.1-32.0)  H  12/14/15  05:20    














- Constitutional


Appears: Well, Non-toxic, No Acute Distress





- Head Exam


Head Exam: ATRAUMATIC, NORMAL INSPECTION, NORMOCEPHALIC





- Eye Exam


Eye Exam: EOMI, Normal appearance, PERRL


Pupil Exam: NORMAL ACCOMODATION, PERRL





- ENT Exam


ENT Exam: Mucous Membranes Moist, Normal Exam





- Neck Exam


Neck Exam: Full ROM, Normal Inspection.  absent: Lymphadenopathy





- Respiratory Exam


Respiratory Exam: Clear to Ausculation Bilateral, NORMAL BREATHING PATTERN





- Cardiovascular Exam


Cardiovascular Exam: REGULAR RHYTHM, +S1, +S2.  absent: Murmur





- GI/Abdominal Exam


GI & Abdominal Exam: Soft, Normal Bowel Sounds.  absent: Tenderness





- Extremities Exam


Extremities Exam: Full ROM, Normal Capillary Refill, Normal Inspection.  absent

: Joint Swelling, Pedal Edema





- Back Exam


Back Exam: NORMAL INSPECTION





- Neurological Exam


Neurological Exam: Alert, Awake, CN II-XII Intact, Normal Gait, Oriented x3





- Psychiatric Exam


Psychiatric exam: Normal Affect, Normal Mood





- Skin


Skin Exam: Dry, Intact, Normal Color, Warm





Assessment and Plan


(1) DVT prophylaxis


Status: Acute





(2) CAD (coronary artery disease)


Status: Acute





(3) Smoking


Status: Chronic





(4) Low back pain


Status: Acute





(5) Agitation


Status: Acute





(6) Scrotal edema


Status: Chronic








- Assessment and Plan (Free Text)


Assessment: 


Assessment and Plan


(1) DVT prophylaxis


Assessment & Plan: 


scd and ae hose, ambulation


Status: Acute





(2) CAD (coronary artery disease)


Assessment & Plan: 


cotn meds


Status: Acute





(3) Smoking


Assessment & Plan: 


nicoderm


Status: Chronic





(4) Low back pain


Assessment & Plan: 


ibuprofen prn


Status: Acute





(5) Agitation


Assessment & Plan: 


seroquel/depakote


ativan/haldol prn


Status: Acute





(6) Scrotal edema


Status: Chronic

## 2016-04-16 RX ADMIN — DIVALPROEX SODIUM SCH MG: 500 TABLET, DELAYED RELEASE ORAL at 08:24

## 2016-04-16 RX ADMIN — DIVALPROEX SODIUM SCH MG: 500 TABLET, DELAYED RELEASE ORAL at 16:06

## 2016-04-16 NOTE — CP.PCM.PN
Subjective





- Date & Time of Evaluation


Date of Evaluation: 04/16/16


Time of Evaluation: 09:09





- Subjective


Subjective: 


pt comfortable nad calm and coopertive no f/c, n/v/d. no complaints.


ros negative.





Objective





- Vital Signs/Intake and Output


Vital Signs (last 24 hours): 


 











Temp Pulse Resp BP Pulse Ox


 


 97.5 F L  66   18   121/73   98 


 


 04/16/16 08:20  04/16/16 08:20  04/16/16 08:20  04/16/16 08:20  04/16/16 08:20











- Medications


Medications: 


 Current Medications





Aspirin (Ecotrin)  81 mg PO DAILY [facility]


   Last Admin: 04/16/16 08:24 Dose:  81 mg


Atorvastatin Calcium (Lipitor)  40 mg PO DAILY [facility]


   Last Admin: 04/16/16 08:25 Dose:  40 mg


Divalproex Sodium (Depakote Dr(*Bid*))  500 mg PO BID [facility]


   Last Admin: 04/16/16 08:24 Dose:  500 mg


Enalapril Maleate (Vasotec)  10 mg PO DAILY [facility]


   Last Admin: 04/16/16 08:26 Dose:  10 mg


Famotidine (Pepcid)  20 mg PO BID [facility]


   Last Admin: 04/16/16 08:26 Dose:  20 mg


Haloperidol Lactate (Haldol)  5 mg IM Q6 PRN


   PRN Reason: Agitation


   Last Admin: 04/05/16 19:43 Dose:  5 mg


Ibuprofen (Motrin Tab)  600 mg PO Q8 PRN


   PRN Reason: Pain, moderate (4-7)


   Last Admin: 04/02/16 09:07 Dose:  600 mg


Levetiracetam (Keppra)  500 mg PO BID [facility]


   Last Admin: 04/16/16 08:24 Dose:  500 mg


Lorazepam (Ativan)  1 mg IM Q4 PRN


   PRN Reason: Agitation


   Last Admin: 04/05/16 22:27 Dose:  1 mg


Metoprolol Tartrate (Lopressor)  50 mg PO Q12 [facility]


   Last Admin: 04/16/16 08:25 Dose:  50 mg


Nicotine (Nicoderm Cq)  1 patch TD DAILY [facility]


   Last Admin: 04/16/16 08:25 Dose:  1 patch


Quetiapine Fumarate (Seroquel)  25 mg PO DAILY@1700 [facility]


   Last Admin: 04/15/16 16:07 Dose:  25 mg











- Labs


Labs: 


 





 03/19/16 05:30 





 03/19/16 05:30 





 











PT  11.2 SECONDS (9.4-12.0)   12/14/15  05:20    


 


INR  1.05  (0.89-1.20)   12/14/15  05:20    


 


APTT  36.2 SECONDS (23.1-32.0)  H  12/14/15  05:20    














- Constitutional


Appears: Well, Non-toxic, No Acute Distress





- Head Exam


Head Exam: ATRAUMATIC, NORMAL INSPECTION, NORMOCEPHALIC





- Eye Exam


Eye Exam: EOMI, Normal appearance, PERRL


Pupil Exam: NORMAL ACCOMODATION, PERRL





- ENT Exam


ENT Exam: Mucous Membranes Moist, Normal Exam





- Neck Exam


Neck Exam: Full ROM, Normal Inspection.  absent: Lymphadenopathy





- Respiratory Exam


Respiratory Exam: Clear to Ausculation Bilateral, NORMAL BREATHING PATTERN





- Cardiovascular Exam


Cardiovascular Exam: REGULAR RHYTHM, +S1, +S2.  absent: Murmur





- GI/Abdominal Exam


GI & Abdominal Exam: Soft, Normal Bowel Sounds.  absent: Tenderness





- Extremities Exam


Extremities Exam: Full ROM, Normal Capillary Refill, Normal Inspection.  absent

: Joint Swelling, Pedal Edema





- Back Exam


Back Exam: NORMAL INSPECTION





- Neurological Exam


Neurological Exam: Alert, Awake, CN II-XII Intact, Normal Gait, Oriented x3





- Psychiatric Exam


Psychiatric exam: Normal Affect, Normal Mood





- Skin


Skin Exam: Dry, Intact, Normal Color, Warm





Assessment and Plan


(1) DVT prophylaxis


Assessment & Plan: 


scd and aehose


ambulation


Status: Acute





(2) CAD (coronary artery disease)


Assessment & Plan: 


cont all meds


Status: Acute





(3) Smoking


Assessment & Plan: 


nicdoerm


Status: Chronic





(4) Low back pain


Assessment & Plan: 


ibuprofen prn


Status: Acute





(5) Agitation


Assessment & Plan: 


ativan/haldol prn


seroquel/depakote


Status: Acute





(6) Scrotal edema


Status: Chronic

## 2016-04-17 RX ADMIN — DIVALPROEX SODIUM SCH MG: 500 TABLET, DELAYED RELEASE ORAL at 08:14

## 2016-04-17 RX ADMIN — DIVALPROEX SODIUM SCH MG: 500 TABLET, DELAYED RELEASE ORAL at 16:04

## 2016-04-17 NOTE — CP.PCM.PN
Subjective





- Date & Time of Evaluation


Date of Evaluation: 04/17/16


Time of Evaluation: 08:18





- Subjective


Subjective: 


no distress, required medication for agitation yesterday.  no f/c, n/v/d. calm 

and cooperative at present


ros negative excep tintermittent agitation





Objective





- Vital Signs/Intake and Output


Vital Signs (last 24 hours): 


 











Temp Pulse Resp BP Pulse Ox


 


 97.5 F L  66   20   112/69   99 


 


 04/17/16 07:40  04/17/16 07:40  04/17/16 07:40  04/17/16 07:40  04/17/16 07:40











- Medications


Medications: 


 Current Medications





Aspirin (Ecotrin)  81 mg PO DAILY [facility]


   Last Admin: 04/17/16 08:14 Dose:  81 mg


Atorvastatin Calcium (Lipitor)  40 mg PO DAILY [facility]


   Last Admin: 04/17/16 08:15 Dose:  40 mg


Divalproex Sodium (Depakote Dr(*Bid*))  500 mg PO BID [facility]


   Last Admin: 04/17/16 08:14 Dose:  500 mg


Enalapril Maleate (Vasotec)  10 mg PO DAILY [facility]


   Last Admin: 04/17/16 08:16 Dose:  10 mg


Famotidine (Pepcid)  20 mg PO BID [facility]


   Last Admin: 04/17/16 08:16 Dose:  20 mg


Haloperidol Lactate (Haldol)  5 mg IM Q6 PRN


   PRN Reason: Agitation


   Last Admin: 04/16/16 19:10 Dose:  5 mg


Ibuprofen (Motrin Tab)  600 mg PO Q8 PRN


   PRN Reason: Pain, moderate (4-7)


   Last Admin: 04/02/16 09:07 Dose:  600 mg


Levetiracetam (Keppra)  500 mg PO BID [facility]


   Last Admin: 04/17/16 08:15 Dose:  500 mg


Lorazepam (Ativan)  1 mg IM Q4 PRN


   PRN Reason: Agitation


   Last Admin: 04/05/16 22:27 Dose:  1 mg


Metoprolol Tartrate (Lopressor)  50 mg PO Q12 [facility]


   Last Admin: 04/17/16 08:15 Dose:  50 mg


Nicotine (Nicoderm Cq)  1 patch TD DAILY [facility]


   Last Admin: 04/17/16 08:15 Dose:  1 patch


Quetiapine Fumarate (Seroquel)  25 mg PO DAILY@1700 [facility]


   Last Admin: 04/16/16 16:07 Dose:  25 mg











- Labs


Labs: 


 





 03/19/16 05:30 





 03/19/16 05:30 





 











PT  11.2 SECONDS (9.4-12.0)   12/14/15  05:20    


 


INR  1.05  (0.89-1.20)   12/14/15  05:20    


 


APTT  36.2 SECONDS (23.1-32.0)  H  12/14/15  05:20    














- Constitutional


Appears: Well, Non-toxic, No Acute Distress





- Head Exam


Head Exam: ATRAUMATIC, NORMAL INSPECTION, NORMOCEPHALIC





- Eye Exam


Eye Exam: EOMI, Normal appearance, PERRL


Pupil Exam: NORMAL ACCOMODATION, PERRL





- ENT Exam


ENT Exam: Mucous Membranes Moist, Normal Exam





- Neck Exam


Neck Exam: Full ROM, Normal Inspection.  absent: Lymphadenopathy





- Respiratory Exam


Respiratory Exam: Clear to Ausculation Bilateral, NORMAL BREATHING PATTERN





- Cardiovascular Exam


Cardiovascular Exam: REGULAR RHYTHM, +S1, +S2.  absent: Murmur





- GI/Abdominal Exam


GI & Abdominal Exam: Soft, Normal Bowel Sounds.  absent: Tenderness





- Extremities Exam


Extremities Exam: Full ROM, Normal Capillary Refill, Normal Inspection.  absent

: Joint Swelling, Pedal Edema





- Back Exam


Back Exam: NORMAL INSPECTION





- Neurological Exam


Neurological Exam: Alert, Awake, CN II-XII Intact, Normal Gait, Oriented x3





- Psychiatric Exam


Psychiatric exam: Normal Affect, Normal Mood





- Skin


Skin Exam: Dry, Intact, Normal Color, Warm





Assessment and Plan


(1) DVT prophylaxis


Assessment & Plan: 


scd adn ae hsoe, ambulation


Status: Acute





(2) CAD (coronary artery disease)


Assessment & Plan: 


cont meds


Status: Acute





(3) Smoking


Assessment & Plan: 


nicoderm


Status: Chronic





(4) Low back pain


Assessment & Plan: 


ibuprofen prn


Status: Acute





(5) Agitation


Assessment & Plan: 


ativan/haldol prn


seroquel/depakote


Status: Acute





(6) Scrotal edema


Status: Chronic

## 2016-04-18 RX ADMIN — DIVALPROEX SODIUM SCH MG: 500 TABLET, DELAYED RELEASE ORAL at 16:11

## 2016-04-18 RX ADMIN — DIVALPROEX SODIUM SCH MG: 500 TABLET, DELAYED RELEASE ORAL at 09:05

## 2016-04-18 NOTE — CP.PCM.PN
Subjective





- Date & Time of Evaluation


Date of Evaluation: 04/18/16


Time of Evaluation: 07:33





- Subjective


Subjective: 


no complaints. no f/c, n/v/d. pending ss for dispo


ros negative at present.





Objective





- Vital Signs/Intake and Output


Vital Signs (last 24 hours): 


 











Temp Pulse Resp BP Pulse Ox


 


 97.5 F L  75   19   124/70   98 


 


 04/18/16 00:00  04/18/16 00:00  04/18/16 00:00  04/18/16 00:00  04/18/16 00:00











- Medications


Medications: 


 Current Medications





Aspirin (Ecotrin)  81 mg PO DAILY [facility]


   Last Admin: 04/17/16 08:14 Dose:  81 mg


Atorvastatin Calcium (Lipitor)  40 mg PO DAILY [facility]


   Last Admin: 04/17/16 08:15 Dose:  40 mg


Divalproex Sodium (Depakote Dr(*Bid*))  500 mg PO BID [facility]


   Last Admin: 04/17/16 16:04 Dose:  500 mg


Enalapril Maleate (Vasotec)  10 mg PO DAILY [facility]


   Last Admin: 04/17/16 08:16 Dose:  10 mg


Famotidine (Pepcid)  20 mg PO BID [facility]


   Last Admin: 04/17/16 16:05 Dose:  20 mg


Haloperidol Lactate (Haldol)  5 mg IM Q6 PRN


   PRN Reason: Agitation


   Last Admin: 04/16/16 19:10 Dose:  5 mg


Ibuprofen (Motrin Tab)  600 mg PO Q8 PRN


   PRN Reason: Pain, moderate (4-7)


   Last Admin: 04/02/16 09:07 Dose:  600 mg


Levetiracetam (Keppra)  500 mg PO BID [facility]


   Last Admin: 04/17/16 16:04 Dose:  500 mg


Lorazepam (Ativan)  1 mg IM Q4 PRN


   PRN Reason: Agitation


   Last Admin: 04/05/16 22:27 Dose:  1 mg


Metoprolol Tartrate (Lopressor)  50 mg PO Q12 [facility]


   Last Admin: 04/17/16 21:28 Dose:  50 mg


Nicotine (Nicoderm Cq)  1 patch TD DAILY [facility]


   Last Admin: 04/17/16 08:15 Dose:  1 patch


Quetiapine Fumarate (Seroquel)  25 mg PO DAILY@1700 [facility]


   Last Admin: 04/17/16 16:05 Dose:  25 mg











- Labs


Labs: 


 





 03/19/16 05:30 





 03/19/16 05:30 





 











PT  11.2 SECONDS (9.4-12.0)   12/14/15  05:20    


 


INR  1.05  (0.89-1.20)   12/14/15  05:20    


 


APTT  36.2 SECONDS (23.1-32.0)  H  12/14/15  05:20    














- Constitutional


Appears: Well, Non-toxic, No Acute Distress





- Head Exam


Head Exam: ATRAUMATIC, NORMAL INSPECTION, NORMOCEPHALIC





- Eye Exam


Eye Exam: EOMI, Normal appearance, PERRL


Pupil Exam: NORMAL ACCOMODATION, PERRL





- ENT Exam


ENT Exam: Mucous Membranes Moist, Normal Exam





- Neck Exam


Neck Exam: Full ROM, Normal Inspection.  absent: Lymphadenopathy





- Respiratory Exam


Respiratory Exam: Clear to Ausculation Bilateral, NORMAL BREATHING PATTERN





- Cardiovascular Exam


Cardiovascular Exam: REGULAR RHYTHM, +S1, +S2.  absent: Murmur





- GI/Abdominal Exam


GI & Abdominal Exam: Soft, Normal Bowel Sounds.  absent: Tenderness





- Extremities Exam


Extremities Exam: Full ROM, Normal Capillary Refill, Normal Inspection.  absent

: Joint Swelling, Pedal Edema





- Back Exam


Back Exam: NORMAL INSPECTION





- Neurological Exam


Neurological Exam: Alert, Awake, CN II-XII Intact, Normal Gait, Oriented x3





- Psychiatric Exam


Psychiatric exam: Normal Affect, Normal Mood





- Skin


Skin Exam: Dry, Intact, Normal Color, Warm





Assessment and Plan


(1) DVT prophylaxis


Assessment & Plan: 


scd and aehose


ambulation


Status: Acute





(2) CAD (coronary artery disease)


Assessment & Plan: 


cont meds


Status: Acute





(3) Smoking


Assessment & Plan: 


nicoderm


Status: Chronic





(4) Low back pain


Assessment & Plan: 


ibuprofen prn


Status: Acute





(5) Agitation


Assessment & Plan: 


ativan/haldol prn


seroquel/depakote


Status: Acute





(6) Scrotal edema


Status: Chronic

## 2016-04-19 RX ADMIN — DIVALPROEX SODIUM SCH MG: 500 TABLET, DELAYED RELEASE ORAL at 16:14

## 2016-04-19 RX ADMIN — DIVALPROEX SODIUM SCH MG: 500 TABLET, DELAYED RELEASE ORAL at 09:28

## 2016-04-19 NOTE — CP.PCM.PN
Subjective





- Date & Time of Evaluation


Date of Evaluation: 04/19/16


Time of Evaluation: 06:58





- Subjective


Subjective: 


pt comfortable, no distress, no complaitns. no f/c, n/v/d.


ros negative except intermittent agitation





Objective





- Vital Signs/Intake and Output


Vital Signs (last 24 hours): 


 











Temp Pulse Resp BP Pulse Ox


 


 97.7 F   82   20   138/79   96 


 


 04/18/16 17:05  04/18/16 20:24  04/18/16 17:05  04/18/16 20:24  04/18/16 17:05











- Medications


Medications: 


 Current Medications





Aspirin (Ecotrin)  81 mg PO DAILY [facility]


   Last Admin: 04/18/16 09:06 Dose:  81 mg


Atorvastatin Calcium (Lipitor)  40 mg PO DAILY [facility]


   Last Admin: 04/18/16 09:06 Dose:  40 mg


Divalproex Sodium (Depakote Dr(*Bid*))  500 mg PO BID [facility]


   Last Admin: 04/18/16 16:11 Dose:  500 mg


Enalapril Maleate (Vasotec)  10 mg PO DAILY [facility]


   Last Admin: 04/18/16 09:09 Dose:  10 mg


Famotidine (Pepcid)  20 mg PO BID [facility]


   Last Admin: 04/18/16 16:11 Dose:  20 mg


Haloperidol Lactate (Haldol)  5 mg IM Q6 PRN


   PRN Reason: Agitation


   Last Admin: 04/18/16 16:09 Dose:  5 mg


Ibuprofen (Motrin Tab)  600 mg PO Q8 PRN


   PRN Reason: Pain, moderate (4-7)


   Last Admin: 04/02/16 09:07 Dose:  600 mg


Levetiracetam (Keppra)  500 mg PO BID [facility]


   Last Admin: 04/18/16 16:11 Dose:  500 mg


Lorazepam (Ativan)  1 mg IM Q4 PRN


   PRN Reason: Agitation


   Last Admin: 04/05/16 22:27 Dose:  1 mg


Metoprolol Tartrate (Lopressor)  50 mg PO Q12 [facility]


   Last Admin: 04/18/16 20:24 Dose:  50 mg


Nicotine (Nicoderm Cq)  1 patch TD DAILY [facility]


   Last Admin: 04/18/16 09:08 Dose:  1 patch


Quetiapine Fumarate (Seroquel)  25 mg PO DAILY@1700 [facility]


   Last Admin: 04/18/16 16:09 Dose:  25 mg











- Labs


Labs: 


 





 03/19/16 05:30 





 03/19/16 05:30 





 











PT  11.2 SECONDS (9.4-12.0)   12/14/15  05:20    


 


INR  1.05  (0.89-1.20)   12/14/15  05:20    


 


APTT  36.2 SECONDS (23.1-32.0)  H  12/14/15  05:20    














- Constitutional


Appears: Well, Non-toxic, No Acute Distress





- Head Exam


Head Exam: ATRAUMATIC, NORMAL INSPECTION, NORMOCEPHALIC





- Eye Exam


Eye Exam: EOMI, Normal appearance, PERRL


Pupil Exam: NORMAL ACCOMODATION, PERRL





- ENT Exam


ENT Exam: Mucous Membranes Moist, Normal Exam





- Neck Exam


Neck Exam: Full ROM, Normal Inspection.  absent: Lymphadenopathy





- Respiratory Exam


Respiratory Exam: Clear to Ausculation Bilateral, NORMAL BREATHING PATTERN





- Cardiovascular Exam


Cardiovascular Exam: REGULAR RHYTHM, +S1, +S2.  absent: Murmur





- GI/Abdominal Exam


GI & Abdominal Exam: Soft, Normal Bowel Sounds.  absent: Tenderness





- Extremities Exam


Extremities Exam: Full ROM, Normal Capillary Refill, Normal Inspection.  absent

: Joint Swelling, Pedal Edema





- Back Exam


Back Exam: NORMAL INSPECTION





- Neurological Exam


Neurological Exam: Alert, Awake, CN II-XII Intact, Normal Gait, Oriented x3





- Psychiatric Exam


Psychiatric exam: Normal Affect, Normal Mood





- Skin


Skin Exam: Dry, Intact, Normal Color, Warm





Assessment and Plan


(1) DVT prophylaxis


Assessment & Plan: 


scd and aehose, ambulation


Status: Acute





(2) CAD (coronary artery disease)


Assessment & Plan: 


ont meds


Status: Acute





(3) Smoking


Assessment & Plan: 


nicoderm


Status: Chronic





(4) Low back pain


Assessment & Plan: 


ibuprofen prn


Status: Acute





(5) Agitation


Assessment & Plan: 


ativan/haldol prn


seroquel/depakote


Status: Acute





(6) Scrotal edema


Status: Chronic

## 2016-04-20 RX ADMIN — DIVALPROEX SODIUM SCH MG: 500 TABLET, DELAYED RELEASE ORAL at 17:31

## 2016-04-20 RX ADMIN — DIVALPROEX SODIUM SCH MG: 500 TABLET, DELAYED RELEASE ORAL at 08:57

## 2016-04-20 NOTE — CP.PCM.PN
Subjective





- Date & Time of Evaluation


Date of Evaluation: 04/20/16


Time of Evaluation: 08:25





- Subjective


Subjective: 


no distress no copmlaitns. pending ss. no f/c, n/vd


ros negative, int agitation





Objective





- Vital Signs/Intake and Output


Vital Signs (last 24 hours): 


 











Temp Pulse Resp BP Pulse Ox


 


 97.5 F L  70   19   127/78   95 


 


 04/20/16 00:00  04/20/16 00:00  04/20/16 00:00  04/20/16 00:00  04/20/16 00:00











- Medications


Medications: 


 Current Medications





Aspirin (Ecotrin)  81 mg PO DAILY [facility]


   Last Admin: 04/19/16 08:49 Dose:  81 mg


Atorvastatin Calcium (Lipitor)  40 mg PO DAILY [facility]


   Last Admin: 04/19/16 08:50 Dose:  40 mg


Divalproex Sodium (Depakote Dr(*Bid*))  500 mg PO BID [facility]


   Last Admin: 04/19/16 16:14 Dose:  500 mg


Enalapril Maleate (Vasotec)  10 mg PO DAILY [facility]


   Last Admin: 04/19/16 08:50 Dose:  10 mg


Famotidine (Pepcid)  20 mg PO BID [facility]


   Last Admin: 04/19/16 16:15 Dose:  20 mg


Haloperidol Lactate (Haldol)  5 mg IM Q6 PRN


   PRN Reason: Agitation


   Last Admin: 04/19/16 16:15 Dose:  5 mg


Ibuprofen (Motrin Tab)  600 mg PO Q8 PRN


   PRN Reason: Pain, moderate (4-7)


   Last Admin: 04/02/16 09:07 Dose:  600 mg


Levetiracetam (Keppra)  500 mg PO BID [facility]


   Last Admin: 04/19/16 16:15 Dose:  500 mg


Lorazepam (Ativan)  1 mg IM Q4 PRN


   PRN Reason: Agitation


   Last Admin: 04/05/16 22:27 Dose:  1 mg


Metoprolol Tartrate (Lopressor)  50 mg PO Q12 [facility]


   Last Admin: 04/19/16 21:09 Dose:  50 mg


Nicotine (Nicoderm Cq)  1 patch TD DAILY [facility]


   Last Admin: 04/19/16 16:12 Dose:  1 patch


Quetiapine Fumarate (Seroquel)  25 mg PO DAILY@1700 [facility]


   Last Admin: 04/19/16 16:15 Dose:  25 mg











- Labs


Labs: 


 





 03/19/16 05:30 





 03/19/16 05:30 





 











PT  11.2 SECONDS (9.4-12.0)   12/14/15  05:20    


 


INR  1.05  (0.89-1.20)   12/14/15  05:20    


 


APTT  36.2 SECONDS (23.1-32.0)  H  12/14/15  05:20    














- Constitutional


Appears: Well, Non-toxic, No Acute Distress





- Head Exam


Head Exam: ATRAUMATIC, NORMAL INSPECTION, NORMOCEPHALIC





- Eye Exam


Eye Exam: EOMI, Normal appearance, PERRL


Pupil Exam: NORMAL ACCOMODATION, PERRL





- ENT Exam


ENT Exam: Mucous Membranes Moist, Normal Exam





- Neck Exam


Neck Exam: Full ROM, Normal Inspection.  absent: Lymphadenopathy





- Respiratory Exam


Respiratory Exam: Clear to Ausculation Bilateral, NORMAL BREATHING PATTERN





- Cardiovascular Exam


Cardiovascular Exam: REGULAR RHYTHM, +S1, +S2.  absent: Murmur





- GI/Abdominal Exam


GI & Abdominal Exam: Soft, Normal Bowel Sounds.  absent: Tenderness





- Extremities Exam


Extremities Exam: Full ROM, Normal Capillary Refill, Normal Inspection.  absent

: Joint Swelling, Pedal Edema





- Back Exam


Back Exam: NORMAL INSPECTION





- Neurological Exam


Neurological Exam: Alert, Awake, CN II-XII Intact, Normal Gait, Oriented x3





- Psychiatric Exam


Psychiatric exam: Normal Affect, Normal Mood





- Skin


Skin Exam: Dry, Intact, Normal Color, Warm





Assessment and Plan


(1) DVT prophylaxis


Assessment & Plan: 


scd and ae hsoe


ambulation


Status: Acute





(2) CAD (coronary artery disease)


Assessment & Plan: 


cont meds


Status: Acute





(3) Smoking


Assessment & Plan: 


nicoderm


Status: Chronic





(4) Low back pain


Assessment & Plan: 


ibuprofen prn


Status: Acute





(5) Agitation


Assessment & Plan: 


seroquel/depakote


ativan/haldol prn


Status: Acute





(6) Scrotal edema


Status: Chronic

## 2016-04-21 RX ADMIN — DIVALPROEX SODIUM SCH MG: 500 TABLET, DELAYED RELEASE ORAL at 09:23

## 2016-04-21 RX ADMIN — DIVALPROEX SODIUM SCH MG: 500 TABLET, DELAYED RELEASE ORAL at 16:47

## 2016-04-21 NOTE — CP.PCM.PN
Subjective





- Date & Time of Evaluation


Date of Evaluation: 04/21/16


Time of Evaluation: 07:13





- Subjective


Subjective: 


pt comfortable, no compaints f/c, n/v/d. pending ss dispo


ros negative excpet int agitation





Objective





- Vital Signs/Intake and Output


Vital Signs (last 24 hours): 


 











Temp Pulse Resp BP Pulse Ox


 


 98.1 F   74   20   121/73   94 L


 


 04/20/16 16:42  04/20/16 21:16  04/20/16 16:42  04/20/16 21:16  04/20/16 16:42











- Medications


Medications: 


 Current Medications





Aspirin (Ecotrin)  81 mg PO DAILY [facility]


   Last Admin: 04/20/16 08:58 Dose:  81 mg


Atorvastatin Calcium (Lipitor)  40 mg PO DAILY [facility]


   Last Admin: 04/20/16 08:58 Dose:  40 mg


Divalproex Sodium (Depakote Dr(*Bid*))  500 mg PO BID [facility]


   Last Admin: 04/20/16 17:31 Dose:  500 mg


Enalapril Maleate (Vasotec)  10 mg PO DAILY [facility]


   Last Admin: 04/20/16 08:59 Dose:  10 mg


Famotidine (Pepcid)  20 mg PO BID [facility]


   Last Admin: 04/20/16 17:32 Dose:  20 mg


Haloperidol Lactate (Haldol)  5 mg IM Q6 PRN


   PRN Reason: Agitation


   Last Admin: 04/20/16 17:32 Dose:  5 mg


Ibuprofen (Motrin Tab)  600 mg PO Q8 PRN


   PRN Reason: Pain, moderate (4-7)


   Last Admin: 04/02/16 09:07 Dose:  600 mg


Levetiracetam (Keppra)  500 mg PO BID [facility]


   Last Admin: 04/20/16 17:32 Dose:  500 mg


Lorazepam (Ativan)  1 mg IM Q4 PRN


   PRN Reason: Agitation


   Last Admin: 04/05/16 22:27 Dose:  1 mg


Metoprolol Tartrate (Lopressor)  50 mg PO Q12 [facility]


   Last Admin: 04/20/16 21:16 Dose:  50 mg


Nicotine (Nicoderm Cq)  1 patch TD DAILY [facility]


   Last Admin: 04/20/16 08:59 Dose:  1 patch


Quetiapine Fumarate (Seroquel)  25 mg PO DAILY@1700 [facility]


   Last Admin: 04/20/16 17:32 Dose:  25 mg











- Labs


Labs: 


 





 03/19/16 05:30 





 03/19/16 05:30 





 











PT  11.2 SECONDS (9.4-12.0)   12/14/15  05:20    


 


INR  1.05  (0.89-1.20)   12/14/15  05:20    


 


APTT  36.2 SECONDS (23.1-32.0)  H  12/14/15  05:20    














- Constitutional


Appears: Well, Non-toxic, No Acute Distress





- Head Exam


Head Exam: ATRAUMATIC, NORMAL INSPECTION, NORMOCEPHALIC





- Eye Exam


Eye Exam: EOMI, Normal appearance, PERRL


Pupil Exam: NORMAL ACCOMODATION, PERRL





- ENT Exam


ENT Exam: Mucous Membranes Moist, Normal Exam





- Neck Exam


Neck Exam: Full ROM, Normal Inspection.  absent: Lymphadenopathy





- Respiratory Exam


Respiratory Exam: Clear to Ausculation Bilateral, NORMAL BREATHING PATTERN





- Cardiovascular Exam


Cardiovascular Exam: REGULAR RHYTHM, +S1, +S2.  absent: Murmur





- GI/Abdominal Exam


GI & Abdominal Exam: Soft, Normal Bowel Sounds.  absent: Tenderness





- Extremities Exam


Extremities Exam: Full ROM, Normal Capillary Refill, Normal Inspection.  absent

: Joint Swelling, Pedal Edema





- Back Exam


Back Exam: NORMAL INSPECTION





- Neurological Exam


Neurological Exam: Alert, Awake, CN II-XII Intact, Normal Gait, Oriented x3





- Psychiatric Exam


Psychiatric exam: Normal Affect, Normal Mood





- Skin


Skin Exam: Dry, Intact, Normal Color, Warm





Assessment and Plan


(1) DVT prophylaxis


Assessment & Plan: 


scd and ae hose


ambulation


Status: Acute





(2) CAD (coronary artery disease)


Assessment & Plan: 


cont meds


Status: Acute





(3) Smoking


Assessment & Plan: 


nicoderm


Status: Chronic





(4) Low back pain


Assessment & Plan: 


ibuprofen prn


Status: Acute





(5) Agitation


Assessment & Plan: 


seroquel/depakote


ativan/haldol prn


Status: Acute





(6) Scrotal edema


Status: Chronic








- Assessment and Plan (Free Text)


Assessment: 


pt remains unable to make decisions for himself


requires constant cues by nursing staff

## 2016-04-22 RX ADMIN — DIVALPROEX SODIUM SCH MG: 500 TABLET, DELAYED RELEASE ORAL at 08:45

## 2016-04-22 RX ADMIN — DIVALPROEX SODIUM SCH MG: 500 TABLET, DELAYED RELEASE ORAL at 16:17

## 2016-04-22 NOTE — CP.PCM.PN
Subjective





- Date & Time of Evaluation


Date of Evaluation: 04/22/16


Time of Evaluation: 08:42





- Subjective


Subjective: 


pt comfortable in chair, no complaints, no f/c, n/v/d. no pain.


ros negative





Objective





- Vital Signs/Intake and Output


Vital Signs (last 24 hours): 


 











Temp Pulse Resp BP Pulse Ox


 


 98.2 F   75   20   124/76   97 


 


 04/21/16 16:38  04/21/16 20:38  04/21/16 16:38  04/21/16 20:38  04/21/16 16:38











- Medications


Medications: 


 Current Medications





Aspirin (Ecotrin)  81 mg PO DAILY [facility]


   Last Admin: 04/21/16 09:24 Dose:  81 mg


Atorvastatin Calcium (Lipitor)  40 mg PO DAILY [facility]


   Last Admin: 04/21/16 09:25 Dose:  40 mg


Divalproex Sodium (Depakote Dr(*Bid*))  500 mg PO BID [facility]


   Last Admin: 04/21/16 16:47 Dose:  500 mg


Enalapril Maleate (Vasotec)  10 mg PO DAILY [facility]


   Last Admin: 04/21/16 09:27 Dose:  10 mg


Famotidine (Pepcid)  20 mg PO BID [facility]


   Last Admin: 04/21/16 16:48 Dose:  20 mg


Haloperidol Lactate (Haldol)  5 mg IM Q6 PRN


   PRN Reason: Agitation


   Last Admin: 04/20/16 17:32 Dose:  5 mg


Ibuprofen (Motrin Tab)  600 mg PO Q8 PRN


   PRN Reason: Pain, moderate (4-7)


   Last Admin: 04/02/16 09:07 Dose:  600 mg


Levetiracetam (Keppra)  500 mg PO BID [facility]


   Last Admin: 04/21/16 16:48 Dose:  500 mg


Lorazepam (Ativan)  1 mg IM Q4 PRN


   PRN Reason: Agitation


   Last Admin: 04/05/16 22:27 Dose:  1 mg


Metoprolol Tartrate (Lopressor)  50 mg PO Q12 [facility]


   Last Admin: 04/21/16 20:38 Dose:  50 mg


Nicotine (Nicoderm Cq)  1 patch TD DAILY [facility]


   Last Admin: 04/21/16 09:26 Dose:  1 patch


Quetiapine Fumarate (Seroquel)  25 mg PO DAILY@1700 [facility]


   Last Admin: 04/21/16 16:48 Dose:  25 mg











- Labs


Labs: 


 





 03/19/16 05:30 





 03/19/16 05:30 





 











PT  11.2 SECONDS (9.4-12.0)   12/14/15  05:20    


 


INR  1.05  (0.89-1.20)   12/14/15  05:20    


 


APTT  36.2 SECONDS (23.1-32.0)  H  12/14/15  05:20    














- Constitutional


Appears: Well, Non-toxic, No Acute Distress





- Head Exam


Head Exam: ATRAUMATIC, NORMAL INSPECTION, NORMOCEPHALIC





- Eye Exam


Eye Exam: EOMI, Normal appearance, PERRL


Pupil Exam: NORMAL ACCOMODATION, PERRL





- ENT Exam


ENT Exam: Mucous Membranes Moist, Normal Exam





- Neck Exam


Neck Exam: Full ROM, Normal Inspection.  absent: Lymphadenopathy





- Respiratory Exam


Respiratory Exam: Clear to Ausculation Bilateral, NORMAL BREATHING PATTERN





- Cardiovascular Exam


Cardiovascular Exam: REGULAR RHYTHM, +S1, +S2.  absent: Murmur





- GI/Abdominal Exam


GI & Abdominal Exam: Soft, Normal Bowel Sounds.  absent: Tenderness





- Extremities Exam


Extremities Exam: Full ROM, Normal Capillary Refill, Normal Inspection.  absent

: Joint Swelling, Pedal Edema





- Back Exam


Back Exam: NORMAL INSPECTION





- Neurological Exam


Neurological Exam: Alert, Awake, CN II-XII Intact, Normal Gait, Oriented x3





- Psychiatric Exam


Psychiatric exam: Normal Affect, Normal Mood





- Skin


Skin Exam: Dry, Intact, Normal Color, Warm





Assessment and Plan


(1) DVT prophylaxis


Assessment & Plan: 


scd  and ae hose ambulation


Status: Acute





(2) CAD (coronary artery disease)


Assessment & Plan: 


cont meds


Status: Acute





(3) Smoking


Assessment & Plan: 


nicoderm


Status: Chronic





(4) Low back pain


Assessment & Plan: 


ibuprofen prn


Status: Acute





(5) Agitation


Assessment & Plan: 


eroquel/depakote


ativan/haldol prn


Status: Acute





(6) Scrotal edema


Status: Chronic

## 2016-04-24 RX ADMIN — DIVALPROEX SODIUM SCH: 500 TABLET, DELAYED RELEASE ORAL at 10:40

## 2016-04-24 RX ADMIN — DIVALPROEX SODIUM SCH MG: 500 TABLET, DELAYED RELEASE ORAL at 09:06

## 2016-04-24 RX ADMIN — DIVALPROEX SODIUM SCH MG: 500 TABLET, DELAYED RELEASE ORAL at 17:17

## 2016-04-24 NOTE — CP.PCM.PN
Subjective





- Date & Time of Evaluation


Date of Evaluation: 04/24/16


Time of Evaluation: 18:00





- Subjective


Subjective: 


pt in no pain/distress/sob. no f/c, n/v/d. calm and cooperative


ros negative except int agitation





Objective





- Vital Signs/Intake and Output


Vital Signs (last 24 hours): 


 











Temp Pulse Resp BP Pulse Ox


 


 98.4 F   70   20   131/82   100 


 


 04/24/16 17:34  04/24/16 21:00  04/24/16 17:34  04/24/16 21:00  04/24/16 17:34











- Medications


Medications: 


 Current Medications





Aspirin (Ecotrin)  81 mg PO DAILY [facility]


   Last Admin: 04/24/16 10:40 Dose:  Not Given


Atorvastatin Calcium (Lipitor)  40 mg PO DAILY [facility]


   Last Admin: 04/24/16 10:41 Dose:  Not Given


Divalproex Sodium (Depakote Dr(*Bid*))  500 mg PO BID [facility]


   Last Admin: 04/24/16 17:17 Dose:  500 mg


Enalapril Maleate (Vasotec)  10 mg PO DAILY [facility]


   Last Admin: 04/24/16 10:42 Dose:  Not Given


Famotidine (Pepcid)  20 mg PO BID [facility]


   Last Admin: 04/24/16 17:17 Dose:  20 mg


Haloperidol Lactate (Haldol)  5 mg IM Q6 PRN


   PRN Reason: Agitation


   Last Admin: 04/20/16 17:32 Dose:  5 mg


Ibuprofen (Motrin Tab)  600 mg PO Q8 PRN


   PRN Reason: Pain, moderate (4-7)


   Last Admin: 04/02/16 09:07 Dose:  600 mg


Levetiracetam (Keppra)  500 mg PO BID [facility]


   Last Admin: 04/24/16 17:17 Dose:  500 mg


Metoprolol Tartrate (Lopressor)  50 mg PO Q12 [facility]


   Last Admin: 04/24/16 21:00 Dose:  50 mg


Nicotine (Nicoderm Cq)  1 patch TD DAILY [facility]


   Last Admin: 04/24/16 10:41 Dose:  Not Given


Quetiapine Fumarate (Seroquel)  25 mg PO DAILY@1700 [facility]


   Last Admin: 04/24/16 17:16 Dose:  25 mg











- Labs


Labs: 


 





 03/19/16 05:30 





 03/19/16 05:30 





 











PT  11.2 SECONDS (9.4-12.0)   12/14/15  05:20    


 


INR  1.05  (0.89-1.20)   12/14/15  05:20    


 


APTT  36.2 SECONDS (23.1-32.0)  H  12/14/15  05:20    














- Constitutional


Appears: Well, Non-toxic, No Acute Distress





- Head Exam


Head Exam: ATRAUMATIC, NORMAL INSPECTION, NORMOCEPHALIC





- Eye Exam


Eye Exam: EOMI, Normal appearance, PERRL


Pupil Exam: NORMAL ACCOMODATION, PERRL





- ENT Exam


ENT Exam: Mucous Membranes Moist, Normal Exam





- Neck Exam


Neck Exam: Full ROM, Normal Inspection.  absent: Lymphadenopathy





- Respiratory Exam


Respiratory Exam: Clear to Ausculation Bilateral, NORMAL BREATHING PATTERN





- Cardiovascular Exam


Cardiovascular Exam: REGULAR RHYTHM, +S1, +S2.  absent: Murmur





- GI/Abdominal Exam


GI & Abdominal Exam: Soft, Normal Bowel Sounds.  absent: Tenderness





- Extremities Exam


Extremities Exam: Full ROM, Normal Capillary Refill, Normal Inspection.  absent

: Joint Swelling, Pedal Edema





- Back Exam


Back Exam: NORMAL INSPECTION





- Neurological Exam


Neurological Exam: Alert, Awake, CN II-XII Intact, Normal Gait, Oriented x3





- Psychiatric Exam


Psychiatric exam: Normal Affect, Normal Mood





- Skin


Skin Exam: Dry, Intact, Normal Color, Warm





Assessment and Plan


(1) DVT prophylaxis


Status: Acute





(2) CAD (coronary artery disease)


Status: Acute





(3) Smoking


Status: Chronic





(4) Low back pain


Status: Acute





(5) Agitation


Status: Acute





(6) Scrotal edema


Status: Chronic








- Assessment and Plan (Free Text)


Assessment: 


(1) DVT prophylaxis


Assessment & Plan: 


scd  and ae hose ambulation


Status: Acute





(2) CAD (coronary artery disease)


Assessment & Plan: 


cont meds


Status: Acute





(3) Smoking


Assessment & Plan: 


nicoderm


Status: Chronic





(4) Low back pain


Assessment & Plan: 


ibuprofen prn


Status: Acute





(5) Agitation


Assessment & Plan: 


eroquel/depakote


ativan/haldol prn


Status: Acute





(6) Scrotal edema


Status: Chronic

## 2016-04-25 RX ADMIN — DIVALPROEX SODIUM SCH MG: 500 TABLET, DELAYED RELEASE ORAL at 08:51

## 2016-04-25 RX ADMIN — DIVALPROEX SODIUM SCH MG: 500 TABLET, DELAYED RELEASE ORAL at 16:44

## 2016-04-25 NOTE — PN
DATE: 04/22/2016



The patient is in bed, in no distress.  No complaints offered.  No fever, chills, nausea, vomiting, d
iarrhea.  The patient had episode of agitation necessitating p.r.n. medicine Tylenol, Ativan and secu
rity being called.  The patient ultimately did not need restraints at that time and has continued to 
be closely monitored.  The patient _____  disposition.



REVIEW OF SYSTEMS:  Negative at this time except for intermittent agitation.  The patient is calm and
 cooperative at this time.



PHYSICAL EXAMINATION:

GENERAL:  The patient is alert and oriented to person and place, though unable to formulate appropria
te and rational thoughts.

HEART:  Regular rate and rhythm.  No murmurs, rubs or gallops.

EXTREMITIES:  Distal pulses, motor, sensation intact.

LUNGS:  Clear in all fields bilaterally.

ABDOMEN:  Soft, nontender.  Bowel sounds present in all fields.



DIAGNOSES:  Agitation, DVT prophylaxis, coronary artery disease status post cardiac arrest, smoking.



PLAN:  We will continue all medications regimen, Seroquel and Depakote with Ativan and Haldol p.r.n. 
for agitation, some sort of neurocognitive disorder as per psychiatry.  We will continue the patient'
s aspirin and all of his other blood pressure medicines for coronary artery disease status post cardi
ac arrest, SCDs and a hose with ambulation for DVT prophylaxis Nicoderm for smoking cessation.  We wi
ll continue to monitor patient closely for patient and staff safety.  We will continue _____ social s
ervices disposition and guardianship for disposition out of the hospital.





__________________________________________

Fan SILVER







cc:



DD: 04/23/2016 07:36:04  [id number]

TT: 04/23/2016 08:45:21

Confirmation # [id number]

Dictation # [id number]

an

## 2016-04-25 NOTE — PN
DATE: 04/25/2016



The patient evaluated in bed.  Calm and cooperative.  Complains of mild lower back pain.  No chest pa
in or shortness of breath.  No fever, chills, nausea, vomiting or diarrhea.  No abdominal pain.  No d
ysuria.  No radiation of back pain.  No numbness or tingling in the extremities.



REVIEW OF SYSTEMS:  Normal except for back pain.



PHYSICAL EXAMINATION:

GENERAL:  The patient alert and oriented.

HEART:  Regular rate and rhythm.  No murmurs, rubs or gallops.

LUNGS:  Clear in all fields bilaterally.

ABDOMEN:  Soft, nontender.  Bowel sounds x 4.

EXTREMITIES:  Distal pulses, motor sensation intact.  Full range of motion.  

BACK:  Mild back pain while lying down.



ASSESSMENT AND PLAN:  

1.  Coronary artery disease status post cardiac arrest.  Continue all medicines pending guardianship 
for further treatment.

2.  Agitation, likely related to neurocognitive disorder.  As per psychiatry, continue Seroquel, Depa
kote, Ativan and Haldol as needed.  The patient is pending guardianship.

3.  Low back pain.  Ibuprofen as needed.  The patient to get out of bed and move around or at least t
o chair.  

4.  Deep venous thrombosis prophylaxis.  SCD and AE hose.  

5.  Will continue to follow.   for appropriate disposition once guardianship is establ
ished.





__________________________________________

Fan SILVER







cc:



DD: 04/25/2016 10:50:17  [id number]

TT: 04/25/2016 11:08:37

Confirmation # [id number]

Dictation # [id number]

mn

## 2016-04-26 RX ADMIN — DIVALPROEX SODIUM SCH MG: 500 TABLET, DELAYED RELEASE ORAL at 08:38

## 2016-04-26 RX ADMIN — DIVALPROEX SODIUM SCH MG: 500 TABLET, DELAYED RELEASE ORAL at 17:12

## 2016-04-26 NOTE — CP.PCM.PN
Subjective





- Date & Time of Evaluation


Date of Evaluation: 04/26/16


Time of Evaluation: 08:38





- Subjective


Subjective: 


no distres, no complaints. back pain resolved


no f/c, n/v/d


pending ss


ros negatie at prenset





Objective





- Vital Signs/Intake and Output


Vital Signs (last 24 hours): 


 











Temp Pulse Resp BP Pulse Ox


 


 97.3 F L  76   18   127/78   98 


 


 04/26/16 08:16  04/26/16 08:16  04/26/16 08:16  04/26/16 08:16  04/26/16 08:16











- Medications


Medications: 


 Current Medications





Aspirin (Ecotrin)  81 mg PO DAILY [facility]


   Last Admin: 04/25/16 08:51 Dose:  81 mg


Atorvastatin Calcium (Lipitor)  40 mg PO DAILY [facility]


   Last Admin: 04/25/16 08:53 Dose:  40 mg


Divalproex Sodium (Depakote Dr(*Bid*))  500 mg PO BID [facility]


   Last Admin: 04/25/16 16:44 Dose:  500 mg


Enalapril Maleate (Vasotec)  10 mg PO DAILY [facility]


   Last Admin: 04/25/16 08:53 Dose:  10 mg


Famotidine (Pepcid)  20 mg PO BID [facility]


   Last Admin: 04/25/16 16:45 Dose:  20 mg


Haloperidol Lactate (Haldol)  5 mg IM Q6 PRN


   PRN Reason: Agitation


   Last Admin: 04/20/16 17:32 Dose:  5 mg


Ibuprofen (Motrin Tab)  600 mg PO Q8 PRN


   PRN Reason: Pain, moderate (4-7)


   Last Admin: 04/02/16 09:07 Dose:  600 mg


Levetiracetam (Keppra)  500 mg PO BID [facility]


   Last Admin: 04/25/16 16:45 Dose:  500 mg


Metoprolol Tartrate (Lopressor)  50 mg PO Q12 [facility]


   Last Admin: 04/25/16 20:33 Dose:  50 mg


Nicotine (Nicoderm Cq)  1 patch TD DAILY [facility]


   Last Admin: 04/25/16 08:54 Dose:  1 patch


Quetiapine Fumarate (Seroquel)  25 mg PO DAILY@1700 [facility]


   Last Admin: 04/25/16 16:45 Dose:  25 mg











- Labs


Labs: 


 





 03/19/16 05:30 





 03/19/16 05:30 





 











PT  11.2 SECONDS (9.4-12.0)   12/14/15  05:20    


 


INR  1.05  (0.89-1.20)   12/14/15  05:20    


 


APTT  36.2 SECONDS (23.1-32.0)  H  12/14/15  05:20    














- Constitutional


Appears: Well, Non-toxic, No Acute Distress





- Head Exam


Head Exam: ATRAUMATIC, NORMAL INSPECTION, NORMOCEPHALIC





- Eye Exam


Eye Exam: EOMI, Normal appearance, PERRL


Pupil Exam: NORMAL ACCOMODATION, PERRL





- ENT Exam


ENT Exam: Mucous Membranes Moist, Normal Exam





- Neck Exam


Neck Exam: Full ROM, Normal Inspection.  absent: Lymphadenopathy





- Respiratory Exam


Respiratory Exam: Clear to Ausculation Bilateral, NORMAL BREATHING PATTERN





- Cardiovascular Exam


Cardiovascular Exam: REGULAR RHYTHM, +S1, +S2.  absent: Murmur





- GI/Abdominal Exam


GI & Abdominal Exam: Soft, Normal Bowel Sounds.  absent: Tenderness





-  Exam


Speculum exam: NORMAL SPECULUM EXAM





- Extremities Exam


Extremities Exam: Full ROM, Normal Capillary Refill, Normal Inspection.  absent

: Joint Swelling, Pedal Edema





- Back Exam


Back Exam: NORMAL INSPECTION





- Neurological Exam


Neurological Exam: Alert, Awake, CN II-XII Intact, Normal Gait, Oriented x3





- Psychiatric Exam


Psychiatric exam: Normal Affect, Normal Mood





- Skin


Skin Exam: Dry, Intact, Normal Color, Warm





Assessment and Plan


(1) DVT prophylaxis


Status: Acute





(2) CAD (coronary artery disease)


Status: Acute





(3) Smoking


Status: Chronic





(4) Low back pain


Status: Acute





(5) Agitation


Status: Acute





(6) Scrotal edema


Status: Chronic








- Assessment and Plan (Free Text)


Assessment: 


(1) DVT prophylaxis


Assessment & Plan: 


scd and ae hose,ambulation


Status: Acute





(2) CAD (coronary artery disease)


Assessment & Plan: 


cont meds


Status: Acute





(3) Smoking


Assessment & Plan: 


nicoderm


Status: Chronic





(4) Low back pain


Assessment & Plan: 


ibuprofen prn


Status: Acute





(5) Agitation


Assessment & Plan: 


seroquel/depakote


ativan/haldol prn


Status: Acute





(6) Scrotal edema


Status: Chronic

## 2016-04-27 RX ADMIN — DIVALPROEX SODIUM SCH MG: 500 TABLET, DELAYED RELEASE ORAL at 08:52

## 2016-04-27 RX ADMIN — DIVALPROEX SODIUM SCH MG: 500 TABLET, DELAYED RELEASE ORAL at 17:07

## 2016-04-27 NOTE — CP.PCM.PN
Subjective





- Date & Time of Evaluation


Date of Evaluation: 04/27/16


Time of Evaluation: 07:27





- Subjective


Subjective: 


pt comfortable in bed, calm adn cooperative. no distress/complaints. no f/c, n/v

/d


ros negative





Objective





- Vital Signs/Intake and Output


Vital Signs (last 24 hours): 


 











Temp Pulse Resp BP Pulse Ox


 


 97 F L  61   19   127/81   99 


 


 04/27/16 00:15  04/27/16 00:15  04/27/16 00:15  04/27/16 00:15  04/27/16 00:15











- Medications


Medications: 


 Current Medications





Aspirin (Ecotrin)  81 mg PO DAILY [facility]


   Last Admin: 04/26/16 08:40 Dose:  81 mg


Atorvastatin Calcium (Lipitor)  40 mg PO DAILY [facility]


   Last Admin: 04/26/16 08:40 Dose:  40 mg


Divalproex Sodium (Depakote Dr(*Bid*))  500 mg PO BID [facility]


   Last Admin: 04/26/16 17:12 Dose:  500 mg


Enalapril Maleate (Vasotec)  10 mg PO DAILY [facility]


   Last Admin: 04/26/16 08:40 Dose:  10 mg


Famotidine (Pepcid)  20 mg PO BID [facility]


   Last Admin: 04/26/16 17:11 Dose:  20 mg


Haloperidol Lactate (Haldol)  5 mg IM Q6 PRN


   PRN Reason: Agitation


   Last Admin: 04/20/16 17:32 Dose:  5 mg


Ibuprofen (Motrin Tab)  600 mg PO Q8 PRN


   PRN Reason: Pain, moderate (4-7)


   Last Admin: 04/02/16 09:07 Dose:  600 mg


Levetiracetam (Keppra)  500 mg PO BID [facility]


   Last Admin: 04/26/16 17:11 Dose:  500 mg


Metoprolol Tartrate (Lopressor)  50 mg PO Q12 [facility]


   Last Admin: 04/26/16 21:07 Dose:  50 mg


Nicotine (Nicoderm Cq)  1 patch TD DAILY [facility]


   Last Admin: 04/25/16 08:54 Dose:  1 patch


Quetiapine Fumarate (Seroquel)  25 mg PO DAILY@1700 [facility]


   Last Admin: 04/26/16 17:11 Dose:  25 mg











- Labs


Labs: 


 





 03/19/16 05:30 





 03/19/16 05:30 





 











PT  11.2 SECONDS (9.4-12.0)   12/14/15  05:20    


 


INR  1.05  (0.89-1.20)   12/14/15  05:20    


 


APTT  36.2 SECONDS (23.1-32.0)  H  12/14/15  05:20    














- Constitutional


Appears: Well, Non-toxic, No Acute Distress





- Head Exam


Head Exam: ATRAUMATIC, NORMAL INSPECTION, NORMOCEPHALIC





- Eye Exam


Eye Exam: EOMI, Normal appearance, PERRL


Pupil Exam: NORMAL ACCOMODATION, PERRL





- ENT Exam


ENT Exam: Mucous Membranes Moist, Normal Exam





- Neck Exam


Neck Exam: Full ROM, Normal Inspection.  absent: Lymphadenopathy





- Respiratory Exam


Respiratory Exam: Clear to Ausculation Bilateral, NORMAL BREATHING PATTERN





- Cardiovascular Exam


Cardiovascular Exam: REGULAR RHYTHM, +S1, +S2.  absent: Murmur





- GI/Abdominal Exam


GI & Abdominal Exam: Soft, Normal Bowel Sounds.  absent: Tenderness





- Extremities Exam


Extremities Exam: Full ROM, Normal Capillary Refill, Normal Inspection.  absent

: Joint Swelling, Pedal Edema





- Back Exam


Back Exam: NORMAL INSPECTION





- Neurological Exam


Neurological Exam: Alert, Awake, CN II-XII Intact, Normal Gait, Oriented x3





- Psychiatric Exam


Psychiatric exam: Normal Affect, Normal Mood





- Skin


Skin Exam: Dry, Intact, Normal Color, Warm





Assessment and Plan


(1) DVT prophylaxis


Assessment & Plan: 


scd and ae hose


Status: Acute





(2) CAD (coronary artery disease)


Assessment & Plan: 


cont meds


Status: Acute





(3) Smoking


Assessment & Plan: 


nicoderm


Status: Chronic





(4) Low back pain


Assessment & Plan: 


ibu profen prn


Status: Acute





(5) Agitation


Assessment & Plan: 


seroquel/depakote


ativan/haldol prn


Status: Acute





(6) Scrotal edema


Status: Chronic

## 2016-04-28 RX ADMIN — DIVALPROEX SODIUM SCH MG: 500 TABLET, DELAYED RELEASE ORAL at 16:18

## 2016-04-28 RX ADMIN — DIVALPROEX SODIUM SCH MG: 500 TABLET, DELAYED RELEASE ORAL at 09:05

## 2016-04-28 NOTE — CP.PCM.PN
Subjective





- Date & Time of Evaluation


Date of Evaluation: 04/28/16


Time of Evaluation: 07:18





- Subjective


Subjective: 


no complaints. no f/c, n/v/d. calm adn coopertive


ros negative.





Objective





- Vital Signs/Intake and Output


Vital Signs (last 24 hours): 


 











Temp Pulse Resp BP Pulse Ox


 


 97.5 F L  60   19   137/73   99 


 


 04/28/16 00:55  04/28/16 00:55  04/28/16 00:55  04/28/16 00:55  04/28/16 00:55











- Medications


Medications: 


 Current Medications





Aspirin (Ecotrin)  81 mg PO DAILY [facility]


   Last Admin: 04/27/16 08:50 Dose:  81 mg


Atorvastatin Calcium (Lipitor)  40 mg PO DAILY [facility]


   Last Admin: 04/27/16 09:00 Dose:  40 mg


Divalproex Sodium (Depakote Dr(*Bid*))  500 mg PO BID [facility]


   Last Admin: 04/27/16 17:07 Dose:  500 mg


Enalapril Maleate (Vasotec)  10 mg PO DAILY [facility]


   Last Admin: 04/27/16 08:50 Dose:  10 mg


Famotidine (Pepcid)  20 mg PO BID [facility]


   Last Admin: 04/27/16 17:07 Dose:  20 mg


Haloperidol Lactate (Haldol)  5 mg IM Q6 PRN


   PRN Reason: Agitation


   Last Admin: 04/20/16 17:32 Dose:  5 mg


Ibuprofen (Motrin Tab)  600 mg PO Q8 PRN


   PRN Reason: Pain, moderate (4-7)


   Last Admin: 04/02/16 09:07 Dose:  600 mg


Levetiracetam (Keppra)  500 mg PO BID [facility]


   Last Admin: 04/27/16 17:07 Dose:  500 mg


Metoprolol Tartrate (Lopressor)  50 mg PO Q12 [facility]


   Last Admin: 04/27/16 20:55 Dose:  50 mg


Nicotine (Nicoderm Cq)  1 patch TD DAILY [facility]


   Last Admin: 04/27/16 08:54 Dose:  1 patch


Quetiapine Fumarate (Seroquel)  25 mg PO DAILY@1700 [facility]


   Last Admin: 04/27/16 17:09 Dose:  25 mg











- Labs


Labs: 


 





 03/19/16 05:30 





 03/19/16 05:30 





 











PT  11.2 SECONDS (9.4-12.0)   12/14/15  05:20    


 


INR  1.05  (0.89-1.20)   12/14/15  05:20    


 


APTT  36.2 SECONDS (23.1-32.0)  H  12/14/15  05:20    














- Constitutional


Appears: Well, Non-toxic, No Acute Distress





- Head Exam


Head Exam: ATRAUMATIC, NORMAL INSPECTION, NORMOCEPHALIC





- Eye Exam


Eye Exam: EOMI, Normal appearance, PERRL


Pupil Exam: NORMAL ACCOMODATION, PERRL





- ENT Exam


ENT Exam: Mucous Membranes Moist, Normal Exam





- Neck Exam


Neck Exam: Full ROM, Normal Inspection.  absent: Lymphadenopathy





- Respiratory Exam


Respiratory Exam: Clear to Ausculation Bilateral, NORMAL BREATHING PATTERN





- Cardiovascular Exam


Cardiovascular Exam: REGULAR RHYTHM, +S1, +S2.  absent: Murmur





- GI/Abdominal Exam


GI & Abdominal Exam: Soft, Normal Bowel Sounds.  absent: Tenderness





- Extremities Exam


Extremities Exam: Full ROM, Normal Capillary Refill, Normal Inspection.  absent

: Joint Swelling, Pedal Edema





- Back Exam


Back Exam: NORMAL INSPECTION





- Neurological Exam


Neurological Exam: Alert, Awake, CN II-XII Intact, Normal Gait, Oriented x3





- Psychiatric Exam


Psychiatric exam: Normal Affect, Normal Mood





- Skin


Skin Exam: Dry, Intact, Normal Color, Warm





Assessment and Plan


(1) DVT prophylaxis


Assessment & Plan: 


scd and ae hose, ambulation


Status: Acute





(2) CAD (coronary artery disease)


Assessment & Plan: 


cont meds


Status: Acute





(3) Smoking


Assessment & Plan: 


nicoderm


Status: Chronic





(4) Low back pain


Assessment & Plan: 


ibuprofen prn


Status: Acute





(5) Agitation


Assessment & Plan: 


seroquel/depakote


ativan/haldol prn


Status: Acute





(6) Scrotal edema


Status: Chronic

## 2016-04-29 RX ADMIN — DIVALPROEX SODIUM SCH MG: 500 TABLET, DELAYED RELEASE ORAL at 16:16

## 2016-04-29 RX ADMIN — DIVALPROEX SODIUM SCH MG: 500 TABLET, DELAYED RELEASE ORAL at 08:35

## 2016-04-29 NOTE — CP.PCM.PN
Subjective





- Date & Time of Evaluation


Date of Evaluation: 04/29/16


Time of Evaluation: 11:15





- Subjective


Subjective: 


Nurse reports that he has been calm but cognitive impairment remains.





When I enter he is seated with his lunch in front of him. He offers an unusual 

warm smile. He usually seems


annoyed upon my approach. Through phone interpretation I learn that he came to 

the hospital last week


from home. He thinks he had back pain, came upstairs and entered his current 

room. He is unsure


of the doctor's plans but is looking forward to the current year 1997 or 1998, 

he is unsure.


He is sleeping well and offers no complaints.





calm, cooperative


normal psychomotor activity


productive speech


"marybeth"


constricted, congruent affect


linear TP


no delusions





Remains severely impaired due to neurocognitive disorder


-continue medications and guardianship process





Objective





- Vital Signs/Intake and Output


Vital Signs (last 24 hours): 


 











Temp Pulse Resp BP Pulse Ox


 


 97.7 F   57 L  18   130/84   100 


 


 04/29/16 08:41  04/29/16 08:41  04/29/16 08:41  04/29/16 08:41  04/29/16 08:41











- Medications


Medications: 


 Current Medications





Aspirin (Ecotrin)  81 mg PO DAILY [facility]


   Last Admin: 04/29/16 08:34 Dose:  81 mg


Atorvastatin Calcium (Lipitor)  40 mg PO DAILY [facility]


   Last Admin: 04/29/16 08:35 Dose:  40 mg


Divalproex Sodium (Depakote Dr(*Bid*))  500 mg PO BID [facility]


   Last Admin: 04/29/16 08:35 Dose:  500 mg


Enalapril Maleate (Vasotec)  10 mg PO DAILY [facility]


   Last Admin: 04/29/16 08:34 Dose:  10 mg


Famotidine (Pepcid)  20 mg PO BID [facility]


   Last Admin: 04/29/16 08:34 Dose:  20 mg


Haloperidol Lactate (Haldol)  5 mg IM Q6 PRN


   PRN Reason: Agitation


   Last Admin: 04/20/16 17:32 Dose:  5 mg


Ibuprofen (Motrin Tab)  600 mg PO Q8 PRN


   PRN Reason: Pain, moderate (4-7)


   Last Admin: 04/02/16 09:07 Dose:  600 mg


Levetiracetam (Keppra)  500 mg PO BID [facility]


   Last Admin: 04/29/16 08:34 Dose:  500 mg


Lorazepam (Ativan)  2 mg IM Q6 PRN


   PRN Reason: Agitation


Metoprolol Tartrate (Lopressor)  50 mg PO Q12 [facility]


   Last Admin: 04/29/16 08:34 Dose:  50 mg


Nicotine (Nicoderm Cq)  1 patch TD DAILY [facility]


   Last Admin: 04/29/16 08:34 Dose:  1 patch


Quetiapine Fumarate (Seroquel)  25 mg PO HS [facility]











- Labs


Labs: 


 





 03/19/16 05:30 





 03/19/16 05:30 





 











PT  11.2 SECONDS (9.4-12.0)   12/14/15  05:20    


 


INR  1.05  (0.89-1.20)   12/14/15  05:20    


 


APTT  36.2 SECONDS (23.1-32.0)  H  12/14/15  05:20

## 2016-04-29 NOTE — CP.PCM.PN
Subjective





- Date & Time of Evaluation


Date of Evaluation: 04/29/16


Time of Evaluation: 08:38





- Subjective


Subjective: 


pt comfortable in bed, no complaints. no f/c, n/v/d. calm adn cooperative


ros negative





Objective





- Vital Signs/Intake and Output


Vital Signs (last 24 hours): 


 











Temp Pulse Resp BP Pulse Ox


 


 97.1 F L  62   19   130/84   100 


 


 04/29/16 00:26  04/29/16 08:34  04/29/16 00:26  04/29/16 08:34  04/29/16 00:26











- Medications


Medications: 


 Current Medications





Aspirin (Ecotrin)  81 mg PO DAILY [facility]


   Last Admin: 04/29/16 08:34 Dose:  81 mg


Atorvastatin Calcium (Lipitor)  40 mg PO DAILY [facility]


   Last Admin: 04/29/16 08:35 Dose:  40 mg


Divalproex Sodium (Depakote Dr(*Bid*))  500 mg PO BID [facility]


   Last Admin: 04/29/16 08:35 Dose:  500 mg


Enalapril Maleate (Vasotec)  10 mg PO DAILY [facility]


   Last Admin: 04/29/16 08:34 Dose:  10 mg


Famotidine (Pepcid)  20 mg PO BID [facility]


   Last Admin: 04/29/16 08:34 Dose:  20 mg


Haloperidol Lactate (Haldol)  5 mg IM Q6 PRN


   PRN Reason: Agitation


   Last Admin: 04/20/16 17:32 Dose:  5 mg


Ibuprofen (Motrin Tab)  600 mg PO Q8 PRN


   PRN Reason: Pain, moderate (4-7)


   Last Admin: 04/02/16 09:07 Dose:  600 mg


Levetiracetam (Keppra)  500 mg PO BID [facility]


   Last Admin: 04/29/16 08:34 Dose:  500 mg


Metoprolol Tartrate (Lopressor)  50 mg PO Q12 [facility]


   Last Admin: 04/29/16 08:34 Dose:  50 mg


Nicotine (Nicoderm Cq)  1 patch TD DAILY [facility]


   Last Admin: 04/29/16 08:34 Dose:  1 patch


Quetiapine Fumarate (Seroquel)  25 mg PO DAILY@1700 [facility]


   Last Admin: 04/28/16 16:18 Dose:  25 mg











- Labs


Labs: 


 





 03/19/16 05:30 





 03/19/16 05:30 





 











PT  11.2 SECONDS (9.4-12.0)   12/14/15  05:20    


 


INR  1.05  (0.89-1.20)   12/14/15  05:20    


 


APTT  36.2 SECONDS (23.1-32.0)  H  12/14/15  05:20    














- Constitutional


Appears: Well, Non-toxic, No Acute Distress





- Head Exam


Head Exam: ATRAUMATIC, NORMAL INSPECTION, NORMOCEPHALIC





- Eye Exam


Eye Exam: EOMI, Normal appearance, PERRL


Pupil Exam: NORMAL ACCOMODATION, PERRL





- ENT Exam


ENT Exam: Mucous Membranes Moist, Normal Exam





- Neck Exam


Neck Exam: Full ROM, Normal Inspection.  absent: Lymphadenopathy





- Respiratory Exam


Respiratory Exam: Clear to Ausculation Bilateral, NORMAL BREATHING PATTERN





- Cardiovascular Exam


Cardiovascular Exam: REGULAR RHYTHM, +S1, +S2.  absent: Murmur





- GI/Abdominal Exam


GI & Abdominal Exam: Soft, Normal Bowel Sounds.  absent: Tenderness





- Extremities Exam


Extremities Exam: Full ROM, Normal Capillary Refill, Normal Inspection.  absent

: Joint Swelling, Pedal Edema





- Back Exam


Back Exam: NORMAL INSPECTION





- Neurological Exam


Neurological Exam: Alert, Awake, CN II-XII Intact, Normal Gait, Oriented x3





- Psychiatric Exam


Psychiatric exam: Normal Affect, Normal Mood





- Skin


Skin Exam: Dry, Intact, Normal Color, Warm





Assessment and Plan


(1) DVT prophylaxis


Assessment & Plan: 


scd and ae hose


ambulation


Status: Acute





(2) CAD (coronary artery disease)


Assessment & Plan: 


cont meds


Status: Acute





(3) Smoking


Assessment & Plan: 


nicoderm


Status: Chronic





(4) Low back pain


Assessment & Plan: 


ibuprofen prn


Status: Acute





(5) Agitation


Assessment & Plan: 


seroquel.depakote 


ativan/haldol prn


Status: Acute





(6) Scrotal edema


Status: Chronic

## 2016-04-30 RX ADMIN — DIVALPROEX SODIUM SCH MG: 500 TABLET, DELAYED RELEASE ORAL at 08:38

## 2016-04-30 RX ADMIN — DIVALPROEX SODIUM SCH MG: 500 TABLET, DELAYED RELEASE ORAL at 16:16

## 2016-04-30 NOTE — CP.PCM.PN
Subjective





- Date & Time of Evaluation


Date of Evaluation: 04/30/16


Time of Evaluation: 11:10





- Subjective


Subjective: 


full consult dictated


no changes


psych note appriciated


calm/cooperaive





Objective





- Vital Signs/Intake and Output


Vital Signs (last 24 hours): 


 











Temp Pulse Resp BP Pulse Ox


 


 97.9 F   79   20   144/86   100 


 


 04/30/16 08:17  04/30/16 08:38  04/30/16 08:17  04/30/16 08:38  04/30/16 08:17











- Medications


Medications: 


 Current Medications





Aspirin (Ecotrin)  81 mg PO DAILY [facility]


   Last Admin: 04/30/16 08:38 Dose:  81 mg


Atorvastatin Calcium (Lipitor)  40 mg PO DAILY [facility]


   Last Admin: 04/30/16 08:38 Dose:  40 mg


Divalproex Sodium (Depakote Dr(*Bid*))  500 mg PO BID [facility]


   Last Admin: 04/30/16 08:38 Dose:  500 mg


Enalapril Maleate (Vasotec)  10 mg PO DAILY [facility]


   Last Admin: 04/30/16 08:38 Dose:  10 mg


Famotidine (Pepcid)  20 mg PO BID [facility]


   Last Admin: 04/30/16 08:38 Dose:  20 mg


Haloperidol Lactate (Haldol)  5 mg IM Q6 PRN


   PRN Reason: Agitation


   Last Admin: 04/20/16 17:32 Dose:  5 mg


Ibuprofen (Motrin Tab)  600 mg PO Q8 PRN


   PRN Reason: Pain, moderate (4-7)


   Last Admin: 04/02/16 09:07 Dose:  600 mg


Levetiracetam (Keppra)  500 mg PO BID [facility]


   Last Admin: 04/30/16 08:38 Dose:  500 mg


Lorazepam (Ativan)  2 mg IM Q6 PRN


   PRN Reason: Agitation


Metoprolol Tartrate (Lopressor)  50 mg PO Q12 [facility]


   Last Admin: 04/30/16 08:38 Dose:  50 mg


Nicotine (Nicoderm Cq)  1 patch TD DAILY [facility]


   Last Admin: 04/30/16 08:38 Dose:  1 patch


Quetiapine Fumarate (Seroquel)  25 mg PO HS [facility]


   Last Admin: 04/29/16 21:23 Dose:  25 mg











- Labs


Labs: 


 





 03/19/16 05:30 





 03/19/16 05:30 





 











PT  11.2 SECONDS (9.4-12.0)   12/14/15  05:20    


 


INR  1.05  (0.89-1.20)   12/14/15  05:20    


 


APTT  36.2 SECONDS (23.1-32.0)  H  12/14/15  05:20    














Assessment and Plan


(1) DVT prophylaxis


Status: Acute





(2) CAD (coronary artery disease)


Status: Acute





(3) Smoking


Status: Chronic





(4) Low back pain


Status: Acute





(5) Agitation


Status: Acute





(6) Scrotal edema


Status: Chronic

## 2016-04-30 NOTE — PN
DATE: 04/30/2016



SUBJECTIVE:  The patient evaluated in bed.  No complaints.  No fever, chills, nausea, vomiting, diarr
hea.  No agitation.  The patient is calm on the floor, psychiatry note from yesterday appreciated.  T
he patient remains on a social hold pending guardianship.  



REVIEW OF SYSTEMS:  Negative.



PHYSICAL EXAMINATION:

GENERAL:  Alert and oriented to his baseline.

LUNGS:  Clear in all fields.

HEART:  Regular rate and rhythm.  No murmurs, rubs or gallops.

ABDOMEN:  Soft, nontender.  Bowel sounds x 4.

EXTREMITIES:  Distal pulses, motor sensation intact.



DIAGNOSIS AND PLAN:  Agitation, Seroquel, Depakote added and Haldol p.r.n.; coronary artery disease s
tatus post cardiac arrest.  Continue all therapy.  Smoking, Nicoderm patch.  deep venous thrombosis p
rophylaxis, sequential compression devices and AE hose.  Will continue to work with  f
or guardianship and discharge planning.





__________________________________________

Fan SILVER







cc:



DD: 04/30/2016 11:50:09  [id number]

TT: 04/30/2016 12:07:37

Confirmation # [id number]

Dictation # [id number]

ln

## 2016-05-01 RX ADMIN — DIVALPROEX SODIUM SCH MG: 500 TABLET, DELAYED RELEASE ORAL at 16:13

## 2016-05-01 RX ADMIN — DIVALPROEX SODIUM SCH MG: 500 TABLET, DELAYED RELEASE ORAL at 10:41

## 2016-05-01 NOTE — CP.PCM.PN
Subjective





- Date & Time of Evaluation


Date of Evaluation: 05/01/16


Time of Evaluation: 08:02





- Subjective


Subjective: 


pt comforatble in bed, no distres,s no f/c, n/v/d


calm and cooperative


ros negative





Objective





- Vital Signs/Intake and Output


Vital Signs (last 24 hours): 


 











Temp Pulse Resp BP Pulse Ox


 


 97.5 F L  68   19   120/85   100 


 


 05/01/16 00:53  05/01/16 00:53  05/01/16 00:53  05/01/16 00:53  05/01/16 00:53











- Medications


Medications: 


 Current Medications





Aspirin (Ecotrin)  81 mg PO DAILY [facility]


   Last Admin: 04/30/16 08:38 Dose:  81 mg


Atorvastatin Calcium (Lipitor)  40 mg PO DAILY [facility]


   Last Admin: 04/30/16 08:38 Dose:  40 mg


Divalproex Sodium (Depakote Dr(*Bid*))  500 mg PO BID [facility]


   Last Admin: 04/30/16 16:16 Dose:  500 mg


Enalapril Maleate (Vasotec)  10 mg PO DAILY [facility]


   Last Admin: 04/30/16 08:38 Dose:  10 mg


Famotidine (Pepcid)  20 mg PO BID [facility]


   Last Admin: 04/30/16 16:16 Dose:  20 mg


Haloperidol Lactate (Haldol)  5 mg IM Q6 PRN


   PRN Reason: Agitation


   Last Admin: 04/20/16 17:32 Dose:  5 mg


Ibuprofen (Motrin Tab)  600 mg PO Q8 PRN


   PRN Reason: Pain, moderate (4-7)


   Last Admin: 04/02/16 09:07 Dose:  600 mg


Levetiracetam (Keppra)  500 mg PO BID [facility]


   Last Admin: 04/30/16 16:16 Dose:  500 mg


Lorazepam (Ativan)  2 mg IM Q6 PRN


   PRN Reason: Agitation


Metoprolol Tartrate (Lopressor)  50 mg PO Q12 [facility]


   Last Admin: 04/30/16 21:08 Dose:  50 mg


Nicotine (Nicoderm Cq)  1 patch TD DAILY [facility]


   Last Admin: 04/30/16 08:38 Dose:  1 patch


Quetiapine Fumarate (Seroquel)  25 mg PO HS [facility]


   Last Admin: 04/30/16 21:07 Dose:  25 mg











- Labs


Labs: 


 





 03/19/16 05:30 





 03/19/16 05:30 





 











PT  11.2 SECONDS (9.4-12.0)   12/14/15  05:20    


 


INR  1.05  (0.89-1.20)   12/14/15  05:20    


 


APTT  36.2 SECONDS (23.1-32.0)  H  12/14/15  05:20    














- Constitutional


Appears: Well, Non-toxic, No Acute Distress





- Head Exam


Head Exam: ATRAUMATIC, NORMAL INSPECTION, NORMOCEPHALIC





- Eye Exam


Eye Exam: EOMI, Normal appearance, PERRL


Pupil Exam: NORMAL ACCOMODATION, PERRL





- ENT Exam


ENT Exam: Mucous Membranes Moist, Normal Exam





- Neck Exam


Neck Exam: Full ROM, Normal Inspection.  absent: Lymphadenopathy





- Respiratory Exam


Respiratory Exam: Clear to Ausculation Bilateral, NORMAL BREATHING PATTERN





- Cardiovascular Exam


Cardiovascular Exam: REGULAR RHYTHM, +S1, +S2.  absent: Murmur





- GI/Abdominal Exam


GI & Abdominal Exam: Soft, Normal Bowel Sounds.  absent: Tenderness





- Extremities Exam


Extremities Exam: Full ROM, Normal Capillary Refill, Normal Inspection.  absent

: Joint Swelling, Pedal Edema





- Back Exam


Back Exam: NORMAL INSPECTION





- Neurological Exam


Neurological Exam: Alert, Awake, CN II-XII Intact, Normal Gait, Oriented x3





- Psychiatric Exam


Psychiatric exam: Normal Affect, Normal Mood





- Skin


Skin Exam: Dry, Intact, Normal Color, Warm





Assessment and Plan


(1) DVT prophylaxis


Assessment & Plan: 


scd and ae hose


ambulation


Status: Acute





(2) CAD (coronary artery disease)


Assessment & Plan: 


s/p cardiac arrest


cont all meds


Status: Acute





(3) Smoking


Assessment & Plan: 


nicoderm


Status: Chronic





(4) Low back pain


Assessment & Plan: 


profen prn


Status: Acute





(5) Agitation


Assessment & Plan: 


seroquel/depakote


ativan;haldol prn


Status: Acute





(6) Scrotal edema


Status: Chronic

## 2016-05-02 RX ADMIN — DIVALPROEX SODIUM SCH MG: 500 TABLET, DELAYED RELEASE ORAL at 17:01

## 2016-05-02 RX ADMIN — DIVALPROEX SODIUM SCH MG: 500 TABLET, DELAYED RELEASE ORAL at 10:32

## 2016-05-02 NOTE — PN
DATE: 05/02/2016



The patient is evaluated in bed.  No complaints offered.  No fever, chills, 
nausea, vomiting, diarrhea.  The patient is calm and cooperative at the present 
time.



REVIEW OF SYSTEMS:  Negative at this time. 



OBJECTIVE FINDINGS:

GENERAL:  Alert and oriented to the patient's baseline.  No distress.

HEART:  Regular rate and rhythm.  No murmurs, rubs or gallops.

ABDOMEN:  Soft, nontender.  Bowel sounds in all quadrants.

EXTREMITIES:  Distal pulses, motor sensation intact.



DIAGNOSES AND PLAN:   CAD, status post cardiac arrest.  We will continue all 
therapies for agitation.  Seroquel, Depakote, Ativan and Haldol as needed.  
Smoking Nicoderm patch, back pain, ibuprofen p.r.n.  The patient remains 
pending guardianship and social service is working on that.  The patient will 
remain on [address] until such time at appropriate discharge and has been 
maintained for this patient.





__________________________________________

Fan SILVER







cc:   



DD: 05/02/2016 08:06:07  1505

TT: 05/02/2016 08:25:46

Confirmation # [id number]

Dictation # [id number]

an

SUMI

## 2016-05-03 RX ADMIN — DIVALPROEX SODIUM SCH MG: 500 TABLET, DELAYED RELEASE ORAL at 08:58

## 2016-05-03 RX ADMIN — DIVALPROEX SODIUM SCH MG: 500 TABLET, DELAYED RELEASE ORAL at 16:33

## 2016-05-03 NOTE — PN
DATE: 05/03/2016



The patient's status remains unchanged.  No complaints of fever, chills, nausea, vomiting, diarrhea. 
 The patient is calm and cooperative, no agitation.  At present time, patient remains pending juan
nship and  is working on that.



REVIEW OF SYSTEMS:  Negative.



OBJECTIVE FINDINGS:

GENERAL:  Alert, oriented to baseline.

HEART:  Regular rate and rhythm.  No murmurs, rubs or gallops.

LUNGS:  Clear to auscultation bilaterally.

ABDOMEN:  Soft, nontender.  Bowel sounds x 4.

NEUROLOGIC:  Distal pulses, motor sensation intact.  Cap refill is brisk.



DIAGNOSESAND PLAN:  The patient's Seroquel, Depakote, Ativan and Haldol are ordered for p.r.n. use.  
Coronary artery disease, status post cardiac arrest.  Continue all medications.  Smoking cessation, N
icoderm patch.  Deep venous thrombosis prophylaxis, sequential compression devices and AE hose.  The 
patient will continue to work with  to obtain guardianship for this patient and discha
rge planning.





__________________________________________

Fan SILVER







cc:



DD: 05/03/2016 11:49:37  1505

TT: 05/03/2016 12:25:09

Confirmation # [id number]

Dictation # [id number]

tn

## 2016-05-03 NOTE — CP.PCM.PN
Subjective





- Date & Time of Evaluation


Date of Evaluation: 05/03/16


Time of Evaluation: 10:24





- Subjective


Subjective: 


FULL CONSULT DICTATED [id number]


NO CHANGES


CALM/COOPERTIVE


PENDING GUARDIANSHIP FOR SS DISPO





Objective





- Vital Signs/Intake and Output


Vital Signs (last 24 hours): 


 











Temp Pulse Resp BP Pulse Ox


 


 98.1 F   54 L  20   143/87   100 


 


 05/03/16 09:14  05/03/16 09:14  05/03/16 09:14  05/03/16 09:14  05/03/16 09:14











- Medications


Medications: 


 Current Medications





Aspirin (Ecotrin)  81 mg PO DAILY [facility]


   Last Admin: 05/03/16 08:58 Dose:  81 mg


Atorvastatin Calcium (Lipitor)  40 mg PO DAILY [facility]


   Last Admin: 05/03/16 08:58 Dose:  40 mg


Divalproex Sodium (Depakote Dr(*Bid*))  500 mg PO BID [facility]


   Last Admin: 05/03/16 08:58 Dose:  500 mg


Enalapril Maleate (Vasotec)  10 mg PO DAILY [facility]


   Last Admin: 05/03/16 08:59 Dose:  10 mg


Famotidine (Pepcid)  20 mg PO BID [facility]


   Last Admin: 05/03/16 08:59 Dose:  20 mg


Haloperidol Lactate (Haldol)  5 mg IM Q6 PRN


   PRN Reason: Agitation


   Last Admin: 04/20/16 17:32 Dose:  5 mg


Ibuprofen (Motrin Tab)  600 mg PO Q8 PRN


   PRN Reason: Pain, moderate (4-7)


   Last Admin: 04/02/16 09:07 Dose:  600 mg


Levetiracetam (Keppra)  500 mg PO BID [facility]


   Last Admin: 05/03/16 08:58 Dose:  500 mg


Lorazepam (Ativan)  2 mg IM Q6 PRN


   PRN Reason: Agitation


Metoprolol Tartrate (Lopressor)  50 mg PO Q12 [facility]


   Last Admin: 05/03/16 08:59 Dose:  50 mg


Nicotine (Nicoderm Cq)  1 patch TD DAILY [facility]


   Last Admin: 05/03/16 08:57 Dose:  1 patch


Quetiapine Fumarate (Seroquel)  25 mg PO HS [facility]


   Last Admin: 05/02/16 21:35 Dose:  25 mg











- Labs


Labs: 


 





 03/19/16 05:30 





 03/19/16 05:30 





 











PT  11.2 SECONDS (9.4-12.0)   12/14/15  05:20    


 


INR  1.05  (0.89-1.20)   12/14/15  05:20    


 


APTT  36.2 SECONDS (23.1-32.0)  H  12/14/15  05:20    














Assessment and Plan


(1) DVT prophylaxis


Status: Acute





(2) CAD (coronary artery disease)


Status: Acute





(3) Smoking


Status: Chronic





(4) Low back pain


Status: Acute





(5) Agitation


Status: Acute





(6) Scrotal edema


Status: Chronic

## 2016-05-04 RX ADMIN — DIVALPROEX SODIUM SCH MG: 500 TABLET, DELAYED RELEASE ORAL at 16:43

## 2016-05-04 RX ADMIN — DIVALPROEX SODIUM SCH MG: 500 TABLET, DELAYED RELEASE ORAL at 08:27

## 2016-05-04 NOTE — PN
DATE: 05/04/2016



The patient evaluated in bed.  No complaints.  No fever, chills, nausea, vomiting, or diarrhea.  No d
istress, no acute changes.  The patient is calm and cooperative at present, remains pending social se
rvices for guardianship and placement.



REVIEW OF SYSTEMS:  Negative.



OBJECTIVE FINDINGS:

GENERAL:  Alert and oriented to baseline.

HEART:  Regular rate and rhythm.  No murmurs, rubs, or gallops.

ABDOMEN:  Soft, nontender.  Bowel sounds x 4.

EXTREMITIES:  Distal pulses, motor sensation intact.  Cap refill brisk.



DIAGNOSIS AND PLAN:  Agitations are related to neurocognitive disorder.  Seroquel and Depakote, Ativa
n and Haldol p.r.n., smoking cessation, Nicoderm patch.  Deep venous thrombosis prophylaxis, sequenti
al compression devices and antiembolism hose, ambulation.  Coronary artery disease status post cardia
c arrest.  We will continue current medicine therapy.





__________________________________________

Fan SILVER







cc:



DD: 05/04/2016 07:53:40  1505

TT: 05/04/2016 08:33:09

Confirmation # [id number]

Dictation # [id number]

zia

## 2016-05-04 NOTE — CP.PCM.PN
Subjective





- Date & Time of Evaluation


Date of Evaluation: 05/04/16


Time of Evaluation: 07:14





- Subjective


Subjective: 


full consult dictated [id number]


no complaints


no agitation


calm/cooperative


for ss guardianship/dispo





Objective





- Vital Signs/Intake and Output


Vital Signs (last 24 hours): 


 











Temp Pulse Resp BP Pulse Ox


 


 97.7 F   68   20   134/69   96 


 


 05/03/16 16:21  05/03/16 21:35  05/03/16 16:21  05/03/16 21:35  05/03/16 16:21











- Medications


Medications: 


 Current Medications





Aspirin (Ecotrin)  81 mg PO DAILY [facility]


   Last Admin: 05/03/16 08:58 Dose:  81 mg


Atorvastatin Calcium (Lipitor)  40 mg PO DAILY [facility]


   Last Admin: 05/03/16 08:58 Dose:  40 mg


Divalproex Sodium (Depakote Dr(*Bid*))  500 mg PO BID [facility]


   Last Admin: 05/03/16 16:33 Dose:  500 mg


Enalapril Maleate (Vasotec)  10 mg PO DAILY [facility]


   Last Admin: 05/03/16 08:59 Dose:  10 mg


Famotidine (Pepcid)  20 mg PO BID [facility]


   Last Admin: 05/03/16 16:33 Dose:  20 mg


Haloperidol Lactate (Haldol)  5 mg IM Q6 PRN


   PRN Reason: Agitation


   Last Admin: 04/20/16 17:32 Dose:  5 mg


Ibuprofen (Motrin Tab)  600 mg PO Q8 PRN


   PRN Reason: Pain, moderate (4-7)


   Last Admin: 04/02/16 09:07 Dose:  600 mg


Levetiracetam (Keppra)  500 mg PO BID [facility]


   Last Admin: 05/03/16 16:32 Dose:  500 mg


Lorazepam (Ativan)  2 mg IM Q6 PRN


   PRN Reason: Agitation


Metoprolol Tartrate (Lopressor)  50 mg PO Q12 [facility]


   Last Admin: 05/03/16 21:35 Dose:  50 mg


Nicotine (Nicoderm Cq)  1 patch TD DAILY [facility]


   Last Admin: 05/03/16 08:57 Dose:  1 patch


Quetiapine Fumarate (Seroquel)  25 mg PO HS [facility]


   Last Admin: 05/03/16 21:35 Dose:  25 mg











- Labs


Labs: 


 





 03/19/16 05:30 





 03/19/16 05:30 





 











PT  11.2 SECONDS (9.4-12.0)   12/14/15  05:20    


 


INR  1.05  (0.89-1.20)   12/14/15  05:20    


 


APTT  36.2 SECONDS (23.1-32.0)  H  12/14/15  05:20    














Assessment and Plan


(1) DVT prophylaxis


Status: Acute





(2) CAD (coronary artery disease)


Status: Acute





(3) Smoking


Status: Chronic





(4) Low back pain


Status: Acute





(5) Agitation


Status: Acute





(6) Scrotal edema


Status: Chronic

## 2016-05-05 RX ADMIN — DIVALPROEX SODIUM SCH MG: 500 TABLET, DELAYED RELEASE ORAL at 16:24

## 2016-05-05 RX ADMIN — DIVALPROEX SODIUM SCH MG: 500 TABLET, DELAYED RELEASE ORAL at 09:01

## 2016-05-05 NOTE — PN
DATE: 05/05/2016



SUBJECTIVE:  The patient evaluated in bed, in no distress.  No agitation, calm 
and cooperative.  No fever, chills, nausea, vomiting, diarrhea.



REVIEW OF SYSTEMS:  Negative at this time.



OBJECTIVE FINDINGS:

GENERAL:  Alert and oriented to baseline.

HEART:  Regular rate and rhythm.  No murmurs, rubs or gallops.

LUNGS:  Clear in all fields was bilaterally.  Respirations even and unlabored.

ABDOMEN:  Soft, nontender.  Bowel sounds x 4.

EXTREMITIES:  Distal pulses, motor sensation intact.  Cap refill is brisk.



DIAGNOSES AND PLAN:  Agitation related to neurocognitive disorder.  Seroquel 
and Depakote are scheduled.  Ativan and Haldol p.r.n.  Coronary artery disease, 
status post cardiac arrest.  Continue all medications.  Deep vein thrombosis 
prophylaxis, SCD and ae hose.  Smoking, Nicoderm patch.  The patient to 
continue to work with  to establish a safe discharge planning 
and guardianship.





__________________________________________

Fan SILVER







cc:   



DD: 05/05/2016 08:00:45  1505

TT: 05/05/2016 08:25:09

Confirmation # [id number]

Dictation # [id number]

an

SUMI

## 2016-05-05 NOTE — CP.PCM.PN
Subjective





- Date & Time of Evaluation


Date of Evaluation: 05/05/16


Time of Evaluation: 07:00





- Subjective


Subjective: 


full consult dictated [id number]


no distress


calm/cooperative


pending guardianship/ss dispo





Objective





- Vital Signs/Intake and Output


Vital Signs (last 24 hours): 


 











Temp Pulse Resp BP Pulse Ox


 


 97.2 F L  68   20   130/80   100 


 


 05/05/16 08:22  05/05/16 08:22  05/05/16 08:22  05/05/16 08:22  05/05/16 08:22











- Medications


Medications: 


 Current Medications





Aspirin (Ecotrin)  81 mg PO DAILY [facility]


   Last Admin: 05/05/16 09:02 Dose:  81 mg


Atorvastatin Calcium (Lipitor)  40 mg PO DAILY [facility]


   Last Admin: 05/05/16 09:02 Dose:  40 mg


Divalproex Sodium (Depakote Dr(*Bid*))  500 mg PO BID [facility]


   Last Admin: 05/05/16 09:01 Dose:  500 mg


Enalapril Maleate (Vasotec)  10 mg PO DAILY [facility]


   Last Admin: 05/05/16 09:04 Dose:  10 mg


Famotidine (Pepcid)  20 mg PO BID [facility]


   Last Admin: 05/05/16 09:04 Dose:  20 mg


Haloperidol Lactate (Haldol)  5 mg IM Q6 PRN


   PRN Reason: Agitation


   Last Admin: 04/20/16 17:32 Dose:  5 mg


Ibuprofen (Motrin Tab)  600 mg PO Q8 PRN


   PRN Reason: Pain, moderate (4-7)


   Last Admin: 04/02/16 09:07 Dose:  600 mg


Levetiracetam (Keppra)  500 mg PO BID [facility]


   Last Admin: 05/05/16 09:02 Dose:  500 mg


Lorazepam (Ativan)  2 mg IM Q6 PRN


   PRN Reason: Agitation


Metoprolol Tartrate (Lopressor)  50 mg PO Q12 [facility]


   Last Admin: 05/05/16 09:02 Dose:  50 mg


Nicotine (Nicoderm Cq)  1 patch TD DAILY [facility]


   Last Admin: 05/05/16 09:03 Dose:  1 patch


Quetiapine Fumarate (Seroquel)  25 mg PO HS [facility]


   Last Admin: 05/04/16 21:03 Dose:  25 mg











- Labs


Labs: 


 





 03/19/16 05:30 





 03/19/16 05:30 





 











PT  11.2 SECONDS (9.4-12.0)   12/14/15  05:20    


 


INR  1.05  (0.89-1.20)   12/14/15  05:20    


 


APTT  36.2 SECONDS (23.1-32.0)  H  12/14/15  05:20    














Assessment and Plan


(1) DVT prophylaxis


Status: Acute





(2) CAD (coronary artery disease)


Status: Acute





(3) Smoking


Status: Chronic





(4) Low back pain


Status: Acute





(5) Agitation


Status: Acute





(6) Scrotal edema


Status: Chronic

## 2016-05-06 RX ADMIN — DIVALPROEX SODIUM SCH MG: 500 TABLET, DELAYED RELEASE ORAL at 08:05

## 2016-05-06 RX ADMIN — DIVALPROEX SODIUM SCH MG: 500 TABLET, DELAYED RELEASE ORAL at 16:04

## 2016-05-06 NOTE — CP.PCM.PN
Subjective





- Date & Time of Evaluation


Date of Evaluation: 05/06/16


Time of Evaluation: 07:51





- Subjective


Subjective: 


pt calm/cooperative, no f/c, n/v/d. no compalints. pt has guardianship court 

date 5/26/16


ros negative


no agitation


still unable to care for self independently





Objective





- Vital Signs/Intake and Output


Vital Signs (last 24 hours): 


 











Temp Pulse Resp BP Pulse Ox


 


 97.5 F L  61   20   140/84   100 


 


 05/06/16 07:49  05/06/16 07:49  05/06/16 07:49  05/06/16 07:49  05/06/16 07:49











- Medications


Medications: 


 Current Medications





Aspirin (Ecotrin)  81 mg PO DAILY [facility]


   Last Admin: 05/05/16 09:02 Dose:  81 mg


Atorvastatin Calcium (Lipitor)  40 mg PO DAILY [facility]


   Last Admin: 05/05/16 09:02 Dose:  40 mg


Divalproex Sodium (Depakote Dr(*Bid*))  500 mg PO BID [facility]


   Last Admin: 05/05/16 16:24 Dose:  500 mg


Enalapril Maleate (Vasotec)  10 mg PO DAILY [facility]


   Last Admin: 05/05/16 09:04 Dose:  10 mg


Famotidine (Pepcid)  20 mg PO BID [facility]


   Last Admin: 05/05/16 16:25 Dose:  20 mg


Haloperidol Lactate (Haldol)  5 mg IM Q6 PRN


   PRN Reason: Agitation


   Last Admin: 04/20/16 17:32 Dose:  5 mg


Ibuprofen (Motrin Tab)  600 mg PO Q8 PRN


   PRN Reason: Pain, moderate (4-7)


   Last Admin: 04/02/16 09:07 Dose:  600 mg


Levetiracetam (Keppra)  500 mg PO BID [facility]


   Last Admin: 05/05/16 16:25 Dose:  500 mg


Lorazepam (Ativan)  2 mg IM Q6 PRN


   PRN Reason: Agitation


Metoprolol Tartrate (Lopressor)  50 mg PO Q12 [facility]


   Last Admin: 05/05/16 21:27 Dose:  50 mg


Nicotine (Nicoderm Cq)  1 patch TD DAILY [facility]


   Last Admin: 05/05/16 09:03 Dose:  1 patch


Quetiapine Fumarate (Seroquel)  25 mg PO HS [facility]


   Last Admin: 05/05/16 21:27 Dose:  25 mg











- Labs


Labs: 


 





 03/19/16 05:30 





 03/19/16 05:30 





 











PT  11.2 SECONDS (9.4-12.0)   12/14/15  05:20    


 


INR  1.05  (0.89-1.20)   12/14/15  05:20    


 


APTT  36.2 SECONDS (23.1-32.0)  H  12/14/15  05:20    














- Constitutional


Appears: Well, Non-toxic, No Acute Distress





- Head Exam


Head Exam: ATRAUMATIC, NORMAL INSPECTION, NORMOCEPHALIC





- Eye Exam


Eye Exam: EOMI, Normal appearance, PERRL


Pupil Exam: NORMAL ACCOMODATION, PERRL





- ENT Exam


ENT Exam: Mucous Membranes Moist, Normal Exam





- Neck Exam


Neck Exam: Full ROM, Normal Inspection.  absent: Lymphadenopathy





- Respiratory Exam


Respiratory Exam: Clear to Ausculation Bilateral, NORMAL BREATHING PATTERN





- Cardiovascular Exam


Cardiovascular Exam: REGULAR RHYTHM, +S1, +S2.  absent: Murmur





- GI/Abdominal Exam


GI & Abdominal Exam: Soft, Normal Bowel Sounds.  absent: Tenderness





- Extremities Exam


Extremities Exam: Full ROM, Normal Capillary Refill, Normal Inspection.  absent

: Joint Swelling, Pedal Edema





- Back Exam


Back Exam: NORMAL INSPECTION





- Neurological Exam


Neurological Exam: Alert, Awake, CN II-XII Intact, Normal Gait, Oriented x3





- Psychiatric Exam


Psychiatric exam: Normal Affect, Normal Mood





- Skin


Skin Exam: Dry, Intact, Normal Color, Warm





Assessment and Plan


(1) DVT prophylaxis


Status: Acute





(2) CAD (coronary artery disease)


Status: Acute





(3) Smoking


Status: Chronic





(4) Low back pain


Status: Acute





(5) Agitation


Status: Acute





(6) Scrotal edema


Status: Chronic








- Assessment and Plan (Free Text)


Assessment: 


(1) DVT prophylaxis


Assessment & Plan: 


scd and ae hose


ambulation


Status: Acute





(2) CAD (coronary artery disease)


Assessment & Plan: 


s/p cardiac arrest


cont all meds


Status: Acute





(3) Smoking


Assessment & Plan: 


nicoderm


Status: Chronic





(4) Low back pain


Assessment & Plan: 


profen prn


Status: Acute





(5) Agitation


Assessment & Plan: 


seroquel/depakote


ativan;haldol prn


Status: Acute





(6) Scrotal edema


Status: Chronic

## 2016-05-07 RX ADMIN — DIVALPROEX SODIUM SCH MG: 500 TABLET, DELAYED RELEASE ORAL at 16:07

## 2016-05-07 RX ADMIN — DIVALPROEX SODIUM SCH MG: 500 TABLET, DELAYED RELEASE ORAL at 08:32

## 2016-05-07 NOTE — CP.PCM.PN
Subjective





- Date & Time of Evaluation


Date of Evaluation: 05/07/16


Time of Evaluation: 09:00





- Subjective


Subjective: 


no complaints. no f/c, n/v/d


calm adn cooperative


ros negative


court date 5/26/16





Objective





- Vital Signs/Intake and Output


Vital Signs (last 24 hours): 


 











Temp Pulse Resp BP Pulse Ox


 


 97.7 F   81   20   121/93 H  98 


 


 05/07/16 08:48  05/07/16 08:48  05/07/16 08:48  05/07/16 08:48  05/07/16 08:48











- Medications


Medications: 


 Current Medications





Aspirin (Ecotrin)  81 mg PO DAILY [facility]


   Last Admin: 05/07/16 08:32 Dose:  81 mg


Atorvastatin Calcium (Lipitor)  40 mg PO DAILY [facility]


   Last Admin: 05/07/16 08:32 Dose:  40 mg


Divalproex Sodium (Depakote Dr(*Bid*))  500 mg PO BID [facility]


   Last Admin: 05/07/16 08:32 Dose:  500 mg


Enalapril Maleate (Vasotec)  10 mg PO DAILY [facility]


   Last Admin: 05/07/16 08:32 Dose:  10 mg


Famotidine (Pepcid)  20 mg PO BID [facility]


   Last Admin: 05/07/16 08:32 Dose:  20 mg


Haloperidol Lactate (Haldol)  5 mg IM Q6 PRN


   PRN Reason: Agitation


   Last Admin: 04/20/16 17:32 Dose:  5 mg


Ibuprofen (Motrin Tab)  600 mg PO Q8 PRN


   PRN Reason: Pain, moderate (4-7)


   Last Admin: 04/02/16 09:07 Dose:  600 mg


Levetiracetam (Keppra)  500 mg PO BID [facility]


   Last Admin: 05/07/16 08:32 Dose:  500 mg


Lorazepam (Ativan)  2 mg IM Q6 PRN


   PRN Reason: Agitation


Metoprolol Tartrate (Lopressor)  50 mg PO Q12 [facility]


   Last Admin: 05/07/16 08:31 Dose:  50 mg


Nicotine (Nicoderm Cq)  1 patch TD DAILY [facility]


   Last Admin: 05/07/16 08:31 Dose:  1 patch


Quetiapine Fumarate (Seroquel)  25 mg PO HS [facility]


   Last Admin: 05/06/16 21:37 Dose:  25 mg











- Labs


Labs: 


 





 03/19/16 05:30 





 03/19/16 05:30 





 











PT  11.2 SECONDS (9.4-12.0)   12/14/15  05:20    


 


INR  1.05  (0.89-1.20)   12/14/15  05:20    


 


APTT  36.2 SECONDS (23.1-32.0)  H  12/14/15  05:20    














- Constitutional


Appears: Well, Non-toxic, No Acute Distress





- Head Exam


Head Exam: ATRAUMATIC, NORMAL INSPECTION, NORMOCEPHALIC





- Eye Exam


Eye Exam: EOMI, Normal appearance, PERRL


Pupil Exam: NORMAL ACCOMODATION, PERRL





- ENT Exam


ENT Exam: Mucous Membranes Moist, Normal Exam





- Neck Exam


Neck Exam: Full ROM, Normal Inspection.  absent: Lymphadenopathy





- Respiratory Exam


Respiratory Exam: Clear to Ausculation Bilateral, NORMAL BREATHING PATTERN





- Cardiovascular Exam


Cardiovascular Exam: REGULAR RHYTHM, +S1, +S2.  absent: Murmur





- GI/Abdominal Exam


GI & Abdominal Exam: Soft, Normal Bowel Sounds.  absent: Tenderness





- Extremities Exam


Extremities Exam: Full ROM, Normal Capillary Refill, Normal Inspection.  absent

: Joint Swelling, Pedal Edema





- Back Exam


Back Exam: NORMAL INSPECTION





- Neurological Exam


Neurological Exam: Alert, Awake, CN II-XII Intact, Normal Gait, Oriented x3





- Psychiatric Exam


Psychiatric exam: Normal Affect, Normal Mood





- Skin


Skin Exam: Dry, Intact, Normal Color, Warm





Assessment and Plan


(1) DVT prophylaxis


Assessment & Plan: 


scd and ae hose


ambulation


Status: Acute





(2) CAD (coronary artery disease)


Assessment & Plan: 


cont meds


s/p cardiac arrest


Status: Acute





(3) Smoking


Assessment & Plan: 


nicoderm


Status: Chronic





(4) Low back pain


Assessment & Plan: 


ibuprofen prn


Status: Acute





(5) Agitation


Assessment & Plan: 


r/t neurocognitive d/o


seroquel/depakote


ativan/haldol prn


Status: Acute





(6) Scrotal edema


Status: Chronic

## 2016-05-08 RX ADMIN — DIVALPROEX SODIUM SCH MG: 500 TABLET, DELAYED RELEASE ORAL at 17:24

## 2016-05-08 RX ADMIN — DIVALPROEX SODIUM SCH MG: 500 TABLET, DELAYED RELEASE ORAL at 09:56

## 2016-05-08 NOTE — CP.PCM.PN
Subjective





- Date & Time of Evaluation


Date of Evaluation: 05/08/16


Time of Evaluation: 12:19





- Subjective


Subjective: 


full consult [id number]


calm and cooperative


no distress/complaint.








Objective





- Vital Signs/Intake and Output


Vital Signs (last 24 hours): 


 











Temp Pulse Resp BP Pulse Ox


 


 96.8 F L  56 L  18   136/79   100 


 


 05/08/16 08:42  05/08/16 08:42  05/08/16 08:42  05/08/16 08:42  05/08/16 08:42











- Medications


Medications: 


 Current Medications





Aspirin (Ecotrin)  81 mg PO DAILY Replaced by [facility]


   Last Admin: 05/08/16 09:58 Dose:  81 mg


Atorvastatin Calcium (Lipitor)  40 mg PO DAILY Replaced by [facility]


   Last Admin: 05/08/16 09:59 Dose:  40 mg


Divalproex Sodium (Depakote Dr(*Bid*))  500 mg PO BID Replaced by [facility]


   Last Admin: 05/08/16 09:56 Dose:  500 mg


Enalapril Maleate (Vasotec)  10 mg PO DAILY Replaced by [facility]


   Last Admin: 05/08/16 09:58 Dose:  10 mg


Famotidine (Pepcid)  20 mg PO BID Replaced by [facility]


   Last Admin: 05/08/16 09:58 Dose:  20 mg


Haloperidol Lactate (Haldol)  5 mg IM Q6 PRN


   PRN Reason: Agitation


   Last Admin: 04/20/16 17:32 Dose:  5 mg


Ibuprofen (Motrin Tab)  600 mg PO Q8 PRN


   PRN Reason: Pain, moderate (4-7)


   Last Admin: 04/02/16 09:07 Dose:  600 mg


Levetiracetam (Keppra)  500 mg PO BID Replaced by [facility]


   Last Admin: 05/08/16 09:57 Dose:  500 mg


Lorazepam (Ativan)  2 mg IM Q6 PRN


   PRN Reason: Agitation


Metoprolol Tartrate (Lopressor)  50 mg PO Q12 Replaced by [facility]


   Last Admin: 05/08/16 09:57 Dose:  50 mg


Nicotine (Nicoderm Cq)  1 patch TD DAILY Replaced by [facility]


   Last Admin: 05/08/16 10:00 Dose:  1 patch


Quetiapine Fumarate (Seroquel)  25 mg PO HS Replaced by [facility]


   Last Admin: 05/07/16 21:15 Dose:  25 mg











- Labs


Labs: 


 





 03/19/16 05:30 





 03/19/16 05:30 





 











PT  11.2 SECONDS (9.4-12.0)   12/14/15  05:20    


 


INR  1.05  (0.89-1.20)   12/14/15  05:20    


 


APTT  36.2 SECONDS (23.1-32.0)  H  12/14/15  05:20    














Assessment and Plan


(1) DVT prophylaxis


Status: Acute





(2) CAD (coronary artery disease)


Status: Acute





(3) Smoking


Status: Chronic





(4) Low back pain


Status: Acute





(5) Agitation


Status: Acute





(6) Scrotal edema


Status: Chronic

## 2016-05-09 RX ADMIN — DIVALPROEX SODIUM SCH MG: 500 TABLET, DELAYED RELEASE ORAL at 08:17

## 2016-05-09 RX ADMIN — DIVALPROEX SODIUM SCH MG: 500 TABLET, DELAYED RELEASE ORAL at 17:06

## 2016-05-09 NOTE — CP.PCM.PN
Subjective





- Date & Time of Evaluation


Date of Evaluation: 05/09/16


Time of Evaluation: 07:29





- Subjective


Subjective: 


full consult dictated [id number]


calm/cooperative


no distress


pending ss dispo





Objective





- Vital Signs/Intake and Output


Vital Signs (last 24 hours): 


 











Temp Pulse Resp BP Pulse Ox


 


 97.3 F L  70   20   119/71   100 


 


 05/09/16 01:01  05/09/16 01:01  05/09/16 01:01  05/09/16 01:01  05/09/16 01:01











- Medications


Medications: 


 Current Medications





Aspirin (Ecotrin)  81 mg PO DAILY [facility]


   Last Admin: 05/08/16 09:58 Dose:  81 mg


Atorvastatin Calcium (Lipitor)  40 mg PO DAILY [facility]


   Last Admin: 05/08/16 09:59 Dose:  40 mg


Divalproex Sodium (Depakote Dr(*Bid*))  500 mg PO BID [facility]


   Last Admin: 05/08/16 17:24 Dose:  500 mg


Enalapril Maleate (Vasotec)  10 mg PO DAILY [facility]


   Last Admin: 05/08/16 09:58 Dose:  10 mg


Famotidine (Pepcid)  20 mg PO BID [facility]


   Last Admin: 05/08/16 17:24 Dose:  20 mg


Haloperidol Lactate (Haldol)  5 mg IM Q6 PRN


   PRN Reason: Agitation


   Last Admin: 04/20/16 17:32 Dose:  5 mg


Ibuprofen (Motrin Tab)  600 mg PO Q8 PRN


   PRN Reason: Pain, moderate (4-7)


   Last Admin: 04/02/16 09:07 Dose:  600 mg


Levetiracetam (Keppra)  500 mg PO BID [facility]


   Last Admin: 05/08/16 17:24 Dose:  500 mg


Lorazepam (Ativan)  2 mg IM Q6 PRN


   PRN Reason: Agitation


Metoprolol Tartrate (Lopressor)  50 mg PO Q12 [facility]


   Last Admin: 05/08/16 21:36 Dose:  50 mg


Nicotine (Nicoderm Cq)  1 patch TD DAILY [facility]


   Last Admin: 05/08/16 10:00 Dose:  1 patch


Quetiapine Fumarate (Seroquel)  25 mg PO HS [facility]


   Last Admin: 05/08/16 21:36 Dose:  25 mg











- Labs


Labs: 


 





 03/19/16 05:30 





 03/19/16 05:30 





 











PT  11.2 SECONDS (9.4-12.0)   12/14/15  05:20    


 


INR  1.05  (0.89-1.20)   12/14/15  05:20    


 


APTT  36.2 SECONDS (23.1-32.0)  H  12/14/15  05:20    














Assessment and Plan


(1) DVT prophylaxis


Status: Acute





(2) CAD (coronary artery disease)


Status: Acute





(3) Smoking


Status: Chronic





(4) Low back pain


Status: Acute





(5) Agitation


Status: Acute





(6) Scrotal edema


Status: Chronic

## 2016-05-09 NOTE — PN
DATE: 05/08/2016



SUBJECTIVE:  The patient is evaluated in bed, in no distress, no complaints.  No fever, chills, nause
a, vomiting or diarrhea.  The patient is calm and cooperative.



REVIEW OF SYSTEMS:  Negative.



OBJECTIVE:

GENERAL:  He is alert and oriented to baseline.

HEART:  Regular rate and rhythm.  No murmurs, rubs or gallops.

LUNGS:  Clear in all fields bilaterally.  Respirations are even and unlabored.

ABDOMEN:  Soft and nontender.  Bowel sounds x 4.

EXTREMITIES:  Distal pulses, motor sensation are intact.  Cap refill is brisk.



DIAGNOSES:  Coronary artery disease status post cardiac arrest.



PLAN:  Continue ____ agitation, Seroquel and Depakote.  Agitation is secondary to neurocognitive diso
rder, Ativan and Haldol, ____.  Smoking, Nicoderm for smoking cessation.  DVT prophylaxis, SCD ____ h
ose.  The patient has court date 05/26/2016 regarding ____ .  Disposition will follow 
at that time.  We will continue to maintain ____ for the patient until then.





__________________________________________

Fan SILVER







cc:



DD: 05/08/2016 22:12:33  [id number]

TT: 05/09/2016 01:42:59

Confirmation # [id number]

Dictation # [id number]

an

## 2016-05-09 NOTE — PN
DATE: 05/09/2016



The patient evaluated in bed.  No complaints offered.  Calm and cooperative.  No fever, chills, nause
a, vomiting, diarrhea.  The patient remains pending , guardianship for placement.



REVIEW OF SYSTEMS:  Normal at present.



OBJECTIVE FINDINGS:

GENERAL:  Alert and oriented at baseline.

HEART:  Regular rate and rhythm.  No murmurs, rubs or gallops.

LUNGS:  Clear lung fields bilaterally.

ABDOMEN:  Soft, nontender.  Bowel sounds x 4.

EXTREMITIES:  Distal pulses, motor sensation intact.  Cap refill is brisk.



DIAGNOSES AND PLAN:  Agitation related to neurocognitive disorder.  Seroquel and Depakote, Ativan and
 Haldol p.r.n.  Peripheral arterial disease, status post cardiac arrest.  Continue all medications.  
Smoking cessation, Nicoderm.  Deep venous thrombosis prophylaxis, sequential compression devices and 
antiembolism hose.  We will continue to follow with  up to and after patient's court d
ate, 5/26/2016, to determine guardianship for this patient and safe disposition thereafter.





__________________________________________

Fan SILVER







cc:



DD: 05/09/2016 07:46:54  [id number]

TT: 05/09/2016 09:36:20

Confirmation # [id number]

Dictation # [id number]

en

## 2016-05-10 RX ADMIN — DIVALPROEX SODIUM SCH MG: 500 TABLET, DELAYED RELEASE ORAL at 09:27

## 2016-05-10 RX ADMIN — DIVALPROEX SODIUM SCH MG: 500 TABLET, DELAYED RELEASE ORAL at 18:19

## 2016-05-10 NOTE — CP.PCM.PN
Subjective





- Date & Time of Evaluation


Date of Evaluation: 05/10/16


Time of Evaluation: 07:11





- Subjective


Subjective: 


full consult dictated [id number]


no agitation


no copmlaitns


pending guardianship





Objective





- Vital Signs/Intake and Output


Vital Signs (last 24 hours): 


 











Temp Pulse Resp BP Pulse Ox


 


 97.2 F L  67   19   115/71   100 


 


 05/10/16 00:52  05/10/16 00:52  05/10/16 00:52  05/10/16 00:52  05/10/16 00:52











- Medications


Medications: 


 Current Medications





Aspirin (Ecotrin)  81 mg PO DAILY [facility]


   Last Admin: 05/09/16 08:19 Dose:  81 mg


Atorvastatin Calcium (Lipitor)  40 mg PO DAILY [facility]


   Last Admin: 05/09/16 08:18 Dose:  40 mg


Divalproex Sodium (Depakote Dr(*Bid*))  500 mg PO BID [facility]


   Last Admin: 05/09/16 17:06 Dose:  500 mg


Enalapril Maleate (Vasotec)  10 mg PO DAILY [facility]


   Last Admin: 05/09/16 08:19 Dose:  10 mg


Famotidine (Pepcid)  20 mg PO BID [facility]


   Last Admin: 05/09/16 17:07 Dose:  20 mg


Haloperidol Lactate (Haldol)  5 mg IM Q6 PRN


   PRN Reason: Agitation


   Last Admin: 04/20/16 17:32 Dose:  5 mg


Ibuprofen (Motrin Tab)  600 mg PO Q8 PRN


   PRN Reason: Pain, moderate (4-7)


   Last Admin: 04/02/16 09:07 Dose:  600 mg


Levetiracetam (Keppra)  500 mg PO BID [facility]


   Last Admin: 05/09/16 17:08 Dose:  500 mg


Lorazepam (Ativan)  2 mg IM Q6 PRN


   PRN Reason: Agitation


Metoprolol Tartrate (Lopressor)  50 mg PO Q12 [facility]


   Last Admin: 05/09/16 21:07 Dose:  50 mg


Nicotine (Nicoderm Cq)  1 patch TD DAILY [facility]


   Last Admin: 05/09/16 08:20 Dose:  1 patch


Quetiapine Fumarate (Seroquel)  25 mg PO HS [facility]


   Last Admin: 05/09/16 21:08 Dose:  25 mg











- Labs


Labs: 


 





 03/19/16 05:30 





 03/19/16 05:30 





 











PT  11.2 SECONDS (9.4-12.0)   12/14/15  05:20    


 


INR  1.05  (0.89-1.20)   12/14/15  05:20    


 


APTT  36.2 SECONDS (23.1-32.0)  H  12/14/15  05:20    














Assessment and Plan


(1) DVT prophylaxis


Status: Acute





(2) CAD (coronary artery disease)


Status: Acute





(3) Smoking


Status: Chronic





(4) Low back pain


Status: Acute





(5) Agitation


Status: Acute





(6) Scrotal edema


Status: Chronic

## 2016-05-10 NOTE — PN
DATE: 05/10/2016



The patient had an overnight events.  No complaints, cooperative.  No fever, chills, nausea, vomiting
 or diarrhea.  



REVIEW OF SYSTEMS:  Negative at this time  



OBJECTIVE FINDINGS: 

GENERAL:  Alert and oriented, baseline. 

HEART:  Regular rate and rhythm.  No murmurs, rubs or gallops.

LUNGS:  Clear in all fields bilaterally.

ABDOMEN:  Soft, nontender.  Bowel sounds x 4.

EXTREMITIES:  Distal pulses, motor sensation intact.  Cap refill is brisk.



DIAGNOSES AND PLAN:  

1.  Coronary artery disease, status post cardiac arrest.  Continue all medication.   

2.  Agitation with some sort of neurocognitive disorder.  Continue psych followup pending social serv
ices for guardianship.  Seroquel and Depakote for behavior control, and Ativan and Haldol on a p.r.n.
 basis for acute agitation.  

3.  Deep venous thrombosis prophylaxis.  SCD and AE hose.

4.  Smoking cessation.  Continue Nicoderm patch.  



The patient has a court date 05/26/2016 and will hopefully be discharged to a safe location once guar
dianship has been established.





__________________________________________

Fan SILVER







cc:



DD: 05/10/2016 07:58:49  [id number]

TT: 05/10/2016 08:38:00

Confirmation # [id number]

Dictation # [id number]

mn

## 2016-05-11 RX ADMIN — DIVALPROEX SODIUM SCH MG: 500 TABLET, DELAYED RELEASE ORAL at 16:11

## 2016-05-11 RX ADMIN — DIVALPROEX SODIUM SCH MG: 500 TABLET, DELAYED RELEASE ORAL at 08:22

## 2016-05-11 NOTE — CP.PCM.PN
Subjective





- Date & Time of Evaluation


Date of Evaluation: 05/11/16


Time of Evaluation: 07:15





- Subjective


Subjective: 


calm/cooperative


no distress


no complaints


no f/c, n/v/d. no pain


ros negative





Objective





- Vital Signs/Intake and Output


Vital Signs (last 24 hours): 


 











Temp Pulse Resp BP Pulse Ox


 


 97.9 F   69   18   141/67   95 


 


 05/11/16 09:23  05/11/16 09:23  05/11/16 09:23  05/11/16 09:23  05/11/16 09:23











- Medications


Medications: 


 Current Medications





Aspirin (Ecotrin)  81 mg PO DAILY [facility]


   Last Admin: 05/11/16 08:22 Dose:  81 mg


Atorvastatin Calcium (Lipitor)  40 mg PO DAILY [facility]


   Last Admin: 05/11/16 08:23 Dose:  40 mg


Divalproex Sodium (Depakote Dr(*Bid*))  500 mg PO BID [facility]


   Last Admin: 05/11/16 08:22 Dose:  500 mg


Enalapril Maleate (Vasotec)  10 mg PO DAILY [facility]


   Last Admin: 05/11/16 08:24 Dose:  10 mg


Famotidine (Pepcid)  20 mg PO BID [facility]


   Last Admin: 05/11/16 08:24 Dose:  20 mg


Haloperidol Lactate (Haldol)  5 mg IM Q6 PRN


   PRN Reason: Agitation


   Last Admin: 04/20/16 17:32 Dose:  5 mg


Ibuprofen (Motrin Tab)  600 mg PO Q8 PRN


   PRN Reason: Pain, moderate (4-7)


   Last Admin: 04/02/16 09:07 Dose:  600 mg


Levetiracetam (Keppra)  500 mg PO BID [facility]


   Last Admin: 05/11/16 08:22 Dose:  500 mg


Lorazepam (Ativan)  2 mg IM Q6 PRN


   PRN Reason: Agitation


Metoprolol Tartrate (Lopressor)  50 mg PO Q12 [facility]


   Last Admin: 05/11/16 08:23 Dose:  50 mg


Nicotine (Nicoderm Cq)  1 patch TD DAILY [facility]


   Last Admin: 05/11/16 08:24 Dose:  1 patch


Quetiapine Fumarate (Seroquel)  25 mg PO HS [facility]


   Last Admin: 05/10/16 21:15 Dose:  25 mg











- Labs


Labs: 


 





 03/19/16 05:30 





 03/19/16 05:30 





 











PT  11.2 SECONDS (9.4-12.0)   12/14/15  05:20    


 


INR  1.05  (0.89-1.20)   12/14/15  05:20    


 


APTT  36.2 SECONDS (23.1-32.0)  H  12/14/15  05:20    














- Constitutional


Appears: Well, Non-toxic, No Acute Distress





- Head Exam


Head Exam: ATRAUMATIC, NORMAL INSPECTION, NORMOCEPHALIC





- Eye Exam


Eye Exam: EOMI, Normal appearance, PERRL


Pupil Exam: NORMAL ACCOMODATION, PERRL





- ENT Exam


ENT Exam: Mucous Membranes Moist, Normal Exam





- Neck Exam


Neck Exam: Full ROM, Normal Inspection.  absent: Lymphadenopathy





- Respiratory Exam


Respiratory Exam: Clear to Ausculation Bilateral, NORMAL BREATHING PATTERN





- Cardiovascular Exam


Cardiovascular Exam: REGULAR RHYTHM, +S1, +S2.  absent: Murmur





- GI/Abdominal Exam


GI & Abdominal Exam: Soft, Normal Bowel Sounds.  absent: Tenderness





- Extremities Exam


Extremities Exam: Full ROM, Normal Capillary Refill, Normal Inspection.  absent

: Joint Swelling, Pedal Edema





- Back Exam


Back Exam: NORMAL INSPECTION





- Neurological Exam


Neurological Exam: Alert, Awake, CN II-XII Intact, Normal Gait, Oriented x3





- Psychiatric Exam


Psychiatric exam: Normal Affect, Normal Mood





- Skin


Skin Exam: Dry, Intact, Normal Color, Warm





Assessment and Plan


(1) DVT prophylaxis


Status: Acute





(2) CAD (coronary artery disease)


Status: Acute





(3) Smoking


Status: Chronic





(4) Low back pain


Status: Acute





(5) Agitation


Status: Acute





(6) Scrotal edema


Status: Chronic








- Assessment and Plan (Free Text)


Assessment: 


(1) DVT prophylaxis


Assessment & Plan: 


scd and ae hose,ambulation


Status: Acute





(2) CAD (coronary artery disease)


Assessment & Plan: 


cont meds


Status: Acute





(3) Smoking


Assessment & Plan: 


nicoderm


Status: Chronic





(4) Low back pain


Assessment & Plan: 


ibuprofen prn


Status: Acute





(5) Agitation


Assessment & Plan: 


seroquel/depakote


ativan/haldol prn


Status: Acute

## 2016-05-12 RX ADMIN — DIVALPROEX SODIUM SCH MG: 500 TABLET, DELAYED RELEASE ORAL at 08:12

## 2016-05-12 RX ADMIN — DIVALPROEX SODIUM SCH MG: 500 TABLET, DELAYED RELEASE ORAL at 16:15

## 2016-05-12 NOTE — CP.PCM.PN
Subjective





- Date & Time of Evaluation


Date of Evaluation: 05/12/16


Time of Evaluation: 07:16





- Subjective


Subjective: 


pt comfortable in bed, no copmalitns, no f/c, n/v/d. calm and cooperative. no 

agitation


ros negative


pending guardianship hearing 5/26/16





Objective





- Vital Signs/Intake and Output


Vital Signs (last 24 hours): 


 











Temp Pulse Resp BP Pulse Ox


 


 98.1 F   72   20   117/73   99 


 


 05/11/16 16:55  05/11/16 21:08  05/11/16 16:55  05/11/16 21:08  05/11/16 16:55











- Medications


Medications: 


 Current Medications





Aspirin (Ecotrin)  81 mg PO DAILY [facility]


   Last Admin: 05/11/16 08:22 Dose:  81 mg


Atorvastatin Calcium (Lipitor)  40 mg PO DAILY [facility]


   Last Admin: 05/11/16 08:23 Dose:  40 mg


Divalproex Sodium (Depakote Dr(*Bid*))  500 mg PO BID [facility]


   Last Admin: 05/11/16 16:11 Dose:  500 mg


Enalapril Maleate (Vasotec)  10 mg PO DAILY [facility]


   Last Admin: 05/11/16 08:24 Dose:  10 mg


Famotidine (Pepcid)  20 mg PO BID [facility]


   Last Admin: 05/11/16 16:12 Dose:  20 mg


Haloperidol Lactate (Haldol)  5 mg IM Q6 PRN


   PRN Reason: Agitation


   Last Admin: 04/20/16 17:32 Dose:  5 mg


Ibuprofen (Motrin Tab)  600 mg PO Q8 PRN


   PRN Reason: Pain, moderate (4-7)


   Last Admin: 04/02/16 09:07 Dose:  600 mg


Levetiracetam (Keppra)  500 mg PO BID [facility]


   Last Admin: 05/11/16 16:12 Dose:  500 mg


Lorazepam (Ativan)  2 mg IM Q6 PRN


   PRN Reason: Agitation


Metoprolol Tartrate (Lopressor)  50 mg PO Q12 [facility]


   Last Admin: 05/11/16 21:08 Dose:  50 mg


Nicotine (Nicoderm Cq)  1 patch TD DAILY [facility]


   Last Admin: 05/11/16 08:24 Dose:  1 patch


Quetiapine Fumarate (Seroquel)  25 mg PO HS [facility]


   Last Admin: 05/11/16 21:08 Dose:  25 mg











- Labs


Labs: 


 





 03/19/16 05:30 





 03/19/16 05:30 





 











PT  11.2 SECONDS (9.4-12.0)   12/14/15  05:20    


 


INR  1.05  (0.89-1.20)   12/14/15  05:20    


 


APTT  36.2 SECONDS (23.1-32.0)  H  12/14/15  05:20    














- Constitutional


Appears: Well, Non-toxic, No Acute Distress





- Head Exam


Head Exam: ATRAUMATIC, NORMAL INSPECTION, NORMOCEPHALIC





- Eye Exam


Eye Exam: EOMI, Normal appearance, PERRL


Pupil Exam: NORMAL ACCOMODATION, PERRL





- ENT Exam


ENT Exam: Mucous Membranes Moist, Normal Exam





- Neck Exam


Neck Exam: Full ROM, Normal Inspection.  absent: Lymphadenopathy





- Respiratory Exam


Respiratory Exam: Clear to Ausculation Bilateral, NORMAL BREATHING PATTERN





- Cardiovascular Exam


Cardiovascular Exam: REGULAR RHYTHM, +S1, +S2.  absent: Murmur





- GI/Abdominal Exam


GI & Abdominal Exam: Soft, Normal Bowel Sounds.  absent: Tenderness





- Extremities Exam


Extremities Exam: Full ROM, Normal Capillary Refill, Normal Inspection.  absent

: Joint Swelling, Pedal Edema





- Back Exam


Back Exam: NORMAL INSPECTION





- Neurological Exam


Neurological Exam: Alert, Awake, CN II-XII Intact, Normal Gait, Oriented x3





- Psychiatric Exam


Psychiatric exam: Normal Affect, Normal Mood





- Skin


Skin Exam: Dry, Intact, Normal Color, Warm





Assessment and Plan


(1) DVT prophylaxis


Assessment & Plan: 


scd and ae hose


ambulation


Status: Acute





(2) CAD (coronary artery disease)


Assessment & Plan: 


s/p cardiac arrest


cont all meds


Status: Acute





(3) Smoking


Assessment & Plan: 


nicoderm


Status: Chronic





(4) Low back pain


Assessment & Plan: 


ibuprofen prn


Status: Acute





(5) Agitation


Assessment & Plan: 


r/t neurocognitive d/o


seroquel/depakote


ativan/haldol prn


Status: Acute





(6) Scrotal edema


Status: Chronic

## 2016-05-13 RX ADMIN — DIVALPROEX SODIUM SCH MG: 500 TABLET, DELAYED RELEASE ORAL at 17:29

## 2016-05-13 RX ADMIN — DIVALPROEX SODIUM SCH MG: 500 TABLET, DELAYED RELEASE ORAL at 09:20

## 2016-05-13 NOTE — CP.PCM.PN
Subjective





- Date & Time of Evaluation


Date of Evaluation: 05/13/16


Time of Evaluation: 11:15





- Subjective


Subjective: 





pt comfortable in bed, no copmalitns, no f/c, n/v/d. calm and cooperative. no 

agitation


ros negative


pending guardianship hearing 5/26/16











Objective





- Vital Signs/Intake and Output


Vital Signs (last 24 hours): 


 











Temp Pulse Resp BP Pulse Ox


 


 97.2 F L  62   20   115/71   98 


 


 05/13/16 08:39  05/13/16 08:39  05/13/16 08:39  05/13/16 08:39  05/13/16 08:39











- Medications


Medications: 


 Current Medications





Aspirin (Ecotrin)  81 mg PO DAILY [facility]


   Last Admin: 05/13/16 09:19 Dose:  81 mg


Atorvastatin Calcium (Lipitor)  40 mg PO DAILY [facility]


   Last Admin: 05/13/16 09:19 Dose:  40 mg


Divalproex Sodium (Depakote Dr(*Bid*))  500 mg PO BID [facility]


   Last Admin: 05/13/16 09:20 Dose:  500 mg


Enalapril Maleate (Vasotec)  10 mg PO DAILY [facility]


   Last Admin: 05/13/16 09:18 Dose:  10 mg


Famotidine (Pepcid)  20 mg PO BID [facility]


   Last Admin: 05/13/16 09:18 Dose:  20 mg


Haloperidol Lactate (Haldol)  5 mg IM Q6 PRN


   PRN Reason: Agitation


   Last Admin: 04/20/16 17:32 Dose:  5 mg


Ibuprofen (Motrin Tab)  600 mg PO Q8 PRN


   PRN Reason: Pain, moderate (4-7)


   Last Admin: 04/02/16 09:07 Dose:  600 mg


Levetiracetam (Keppra)  500 mg PO BID [facility]


   Last Admin: 05/13/16 09:18 Dose:  500 mg


Lorazepam (Ativan)  2 mg IM Q6 PRN


   PRN Reason: Agitation


Metoprolol Tartrate (Lopressor)  50 mg PO Q12 [facility]


   Last Admin: 05/13/16 09:17 Dose:  50 mg


Nicotine (Nicoderm Cq)  1 patch TD DAILY [facility]


   Last Admin: 05/13/16 09:20 Dose:  Not Given


Quetiapine Fumarate (Seroquel)  25 mg PO HS [facility]


   Last Admin: 05/12/16 21:33 Dose:  25 mg











- Labs


Labs: 


 





 03/19/16 05:30 





 03/19/16 05:30 





 











PT  11.2 SECONDS (9.4-12.0)   12/14/15  05:20    


 


INR  1.05  (0.89-1.20)   12/14/15  05:20    


 


APTT  36.2 SECONDS (23.1-32.0)  H  12/14/15  05:20    














- Constitutional


Appears: Well, Non-toxic, No Acute Distress





- Head Exam


Head Exam: ATRAUMATIC, NORMAL INSPECTION, NORMOCEPHALIC





- Eye Exam


Eye Exam: EOMI, Normal appearance, PERRL


Pupil Exam: NORMAL ACCOMODATION, PERRL





- ENT Exam


ENT Exam: Mucous Membranes Moist, Normal Exam





- Neck Exam


Neck Exam: Full ROM, Normal Inspection.  absent: Lymphadenopathy





- Respiratory Exam


Respiratory Exam: Clear to Ausculation Bilateral, NORMAL BREATHING PATTERN





- Cardiovascular Exam


Cardiovascular Exam: REGULAR RHYTHM, +S1, +S2.  absent: Murmur





- GI/Abdominal Exam


GI & Abdominal Exam: Soft, Normal Bowel Sounds.  absent: Tenderness





- Rectal Exam


Rectal Exam: NORMAL INSPECTION





- Extremities Exam


Extremities Exam: Full ROM, Normal Capillary Refill, Normal Inspection.  absent

: Joint Swelling, Pedal Edema





- Back Exam


Back Exam: NORMAL INSPECTION





- Neurological Exam


Neurological Exam: Alert, Awake, CN II-XII Intact, Normal Gait, Oriented x3





- Psychiatric Exam


Psychiatric exam: Normal Affect, Normal Mood





- Skin


Skin Exam: Dry, Intact, Normal Color, Warm





Assessment and Plan


(1) DVT prophylaxis


Status: Acute





(2) CAD (coronary artery disease)


Status: Acute





(3) Smoking


Status: Chronic





(4) Low back pain


Status: Acute





(5) Agitation


Status: Acute





(6) Scrotal edema


Status: Chronic








- Assessment and Plan (Free Text)


Assessment: 





Assessment and Plan


(1) DVT prophylaxis


Assessment & Plan: 


scd and ae hose


ambulation


Status: Acute





(2) CAD (coronary artery disease)


Assessment & Plan: 


s/p cardiac arrest


cont all meds


Status: Acute





(3) Smoking


Assessment & Plan: 


nicoderm


Status: Chronic





(4) Low back pain


Assessment & Plan: 


ibuprofen prn


Status: Acute





(5) Agitation


Assessment & Plan: 


r/t neurocognitive d/o


seroquel/depakote


ativan/haldol prn


Status: Acute





(6) Scrotal edema


Status: Chronic

## 2016-05-14 RX ADMIN — DIVALPROEX SODIUM SCH MG: 500 TABLET, DELAYED RELEASE ORAL at 08:22

## 2016-05-14 RX ADMIN — DIVALPROEX SODIUM SCH MG: 500 TABLET, DELAYED RELEASE ORAL at 17:38

## 2016-05-14 NOTE — CP.PCM.PN
Subjective





- Date & Time of Evaluation


Date of Evaluation: 05/14/16


Time of Evaluation: 07:15





- Subjective


Subjective: 


pt comfortable in bed, no distress, no complaints.  


no f/c, n/v/d


ros negative


calm and cooperative





Objective





- Vital Signs/Intake and Output


Vital Signs (last 24 hours): 


 











Temp Pulse Resp BP Pulse Ox


 


 97.9 F   66   19   134/74   100 


 


 05/14/16 00:43  05/14/16 00:43  05/14/16 00:43  05/14/16 00:43  05/14/16 00:43











- Medications


Medications: 


 Current Medications





Aspirin (Ecotrin)  81 mg PO DAILY [facility]


   Last Admin: 05/13/16 09:19 Dose:  81 mg


Atorvastatin Calcium (Lipitor)  40 mg PO DAILY [facility]


   Last Admin: 05/13/16 09:19 Dose:  40 mg


Divalproex Sodium (Depakote Dr(*Bid*))  500 mg PO BID [facility]


   Last Admin: 05/13/16 17:29 Dose:  500 mg


Enalapril Maleate (Vasotec)  10 mg PO DAILY [facility]


   Last Admin: 05/13/16 09:18 Dose:  10 mg


Famotidine (Pepcid)  20 mg PO BID [facility]


   Last Admin: 05/13/16 17:29 Dose:  20 mg


Haloperidol Lactate (Haldol)  5 mg IM Q6 PRN


   PRN Reason: Agitation


   Last Admin: 04/20/16 17:32 Dose:  5 mg


Ibuprofen (Motrin Tab)  600 mg PO Q8 PRN


   PRN Reason: Pain, moderate (4-7)


   Last Admin: 04/02/16 09:07 Dose:  600 mg


Levetiracetam (Keppra)  500 mg PO BID [facility]


   Last Admin: 05/13/16 17:30 Dose:  500 mg


Lorazepam (Ativan)  2 mg IM Q6 PRN


   PRN Reason: Agitation


Metoprolol Tartrate (Lopressor)  50 mg PO Q12 [facility]


   Last Admin: 05/13/16 21:41 Dose:  50 mg


Nicotine (Nicoderm Cq)  1 patch TD DAILY [facility]


   Last Admin: 05/13/16 09:20 Dose:  Not Given


Quetiapine Fumarate (Seroquel)  25 mg PO HS [facility]


   Last Admin: 05/13/16 21:41 Dose:  25 mg











- Labs


Labs: 


 





 03/19/16 05:30 





 03/19/16 05:30 





 











PT  11.2 SECONDS (9.4-12.0)   12/14/15  05:20    


 


INR  1.05  (0.89-1.20)   12/14/15  05:20    


 


APTT  36.2 SECONDS (23.1-32.0)  H  12/14/15  05:20    














- Constitutional


Appears: Well, Non-toxic, No Acute Distress





- Head Exam


Head Exam: ATRAUMATIC, NORMAL INSPECTION, NORMOCEPHALIC





- Eye Exam


Eye Exam: EOMI, Normal appearance, PERRL


Pupil Exam: NORMAL ACCOMODATION, PERRL





- ENT Exam


ENT Exam: Mucous Membranes Moist, Normal Exam





- Neck Exam


Neck Exam: Full ROM, Normal Inspection.  absent: Lymphadenopathy





- Respiratory Exam


Respiratory Exam: Clear to Ausculation Bilateral, NORMAL BREATHING PATTERN





- Cardiovascular Exam


Cardiovascular Exam: REGULAR RHYTHM, +S1, +S2.  absent: Murmur





- GI/Abdominal Exam


GI & Abdominal Exam: Soft, Normal Bowel Sounds.  absent: Tenderness





- Extremities Exam


Extremities Exam: Full ROM, Normal Capillary Refill, Normal Inspection.  absent

: Joint Swelling, Pedal Edema





- Back Exam


Back Exam: NORMAL INSPECTION





- Neurological Exam


Neurological Exam: Alert, Awake, CN II-XII Intact, Normal Gait, Oriented x3





- Psychiatric Exam


Psychiatric exam: Normal Affect, Normal Mood





- Skin


Skin Exam: Dry, Intact, Normal Color, Warm





Assessment and Plan


(1) DVT prophylaxis


Assessment & Plan: 


scd and aehose


ambulation


Status: Acute





(2) CAD (coronary artery disease)


Assessment & Plan: 


s/p cardiac arrest


cont meds


Status: Acute





(3) Smoking


Assessment & Plan: 


nicoderm


Status: Chronic





(4) Low back pain


Status: Acute





(5) Agitation


Assessment & Plan: 


rt/ neuro congnitive d/o


cont seroquel/depakote


ativna/hadol prn


Status: Acute





(6) Scrotal edema


Status: Chronic

## 2016-05-15 RX ADMIN — DIVALPROEX SODIUM SCH MG: 500 TABLET, DELAYED RELEASE ORAL at 16:30

## 2016-05-15 RX ADMIN — DIVALPROEX SODIUM SCH MG: 500 TABLET, DELAYED RELEASE ORAL at 09:10

## 2016-05-15 NOTE — CP.PCM.PN
Subjective





- Date & Time of Evaluation


Date of Evaluation: 05/15/16


Time of Evaluation: 11:19





- Subjective


Subjective: 


pt comfortable in bed, no distres, no f/c, n/v/d. 


calm adn cooperaive


ros negative





Objective





- Vital Signs/Intake and Output


Vital Signs (last 24 hours): 


 











Temp Pulse Resp BP Pulse Ox


 


 97.3 F L  86   20   120/71   100 


 


 05/15/16 07:47  05/15/16 09:10  05/15/16 07:47  05/15/16 09:10  05/15/16 07:47











- Medications


Medications: 


 Current Medications





Aspirin (Ecotrin)  81 mg PO DAILY [facility]


   Last Admin: 05/15/16 09:11 Dose:  81 mg


Atorvastatin Calcium (Lipitor)  40 mg PO DAILY [facility]


   Last Admin: 05/15/16 09:10 Dose:  40 mg


Divalproex Sodium (Depakote Dr(*Bid*))  500 mg PO BID [facility]


   Last Admin: 05/15/16 09:10 Dose:  500 mg


Enalapril Maleate (Vasotec)  10 mg PO DAILY [facility]


   Last Admin: 05/15/16 09:11 Dose:  10 mg


Famotidine (Pepcid)  20 mg PO BID [facility]


   Last Admin: 05/15/16 09:11 Dose:  20 mg


Haloperidol Lactate (Haldol)  5 mg IM Q6 PRN


   PRN Reason: Agitation


   Last Admin: 04/20/16 17:32 Dose:  5 mg


Ibuprofen (Motrin Tab)  600 mg PO Q8 PRN


   PRN Reason: Pain, moderate (4-7)


   Last Admin: 04/02/16 09:07 Dose:  600 mg


Levetiracetam (Keppra)  500 mg PO BID [facility]


   Last Admin: 05/15/16 09:11 Dose:  500 mg


Lorazepam (Ativan)  2 mg IM Q6 PRN


   PRN Reason: Agitation


Metoprolol Tartrate (Lopressor)  50 mg PO Q12 [facility]


   Last Admin: 05/15/16 09:10 Dose:  50 mg


Nicotine (Nicoderm Cq)  1 patch TD DAILY [facility]


   Last Admin: 05/15/16 09:11 Dose:  1 patch


Quetiapine Fumarate (Seroquel)  25 mg PO HS [facility]


   Last Admin: 05/14/16 21:21 Dose:  25 mg











- Labs


Labs: 


 





 03/19/16 05:30 





 03/19/16 05:30 





 











PT  11.2 SECONDS (9.4-12.0)   12/14/15  05:20    


 


INR  1.05  (0.89-1.20)   12/14/15  05:20    


 


APTT  36.2 SECONDS (23.1-32.0)  H  12/14/15  05:20    














- Constitutional


Appears: Well, Non-toxic, No Acute Distress





- Head Exam


Head Exam: ATRAUMATIC, NORMAL INSPECTION, NORMOCEPHALIC





- Eye Exam


Eye Exam: EOMI, Normal appearance, PERRL


Pupil Exam: NORMAL ACCOMODATION, PERRL





- ENT Exam


ENT Exam: Mucous Membranes Moist, Normal Exam





- Neck Exam


Neck Exam: Full ROM, Normal Inspection.  absent: Lymphadenopathy





- Respiratory Exam


Respiratory Exam: Clear to Ausculation Bilateral, NORMAL BREATHING PATTERN





- Cardiovascular Exam


Cardiovascular Exam: REGULAR RHYTHM, +S1, +S2.  absent: Murmur





- GI/Abdominal Exam


GI & Abdominal Exam: Soft, Normal Bowel Sounds.  absent: Tenderness





- Extremities Exam


Extremities Exam: Full ROM, Normal Capillary Refill, Normal Inspection.  absent

: Joint Swelling, Pedal Edema





- Back Exam


Back Exam: NORMAL INSPECTION





- Neurological Exam


Neurological Exam: Alert, Awake, CN II-XII Intact, Normal Gait, Oriented x3





- Psychiatric Exam


Psychiatric exam: Normal Affect, Normal Mood





- Skin


Skin Exam: Dry, Intact, Normal Color, Warm





Assessment and Plan


(1) DVT prophylaxis


Status: Acute





(2) CAD (coronary artery disease)


Status: Acute





(3) Smoking


Status: Chronic





(4) Low back pain


Status: Acute





(5) Agitation


Status: Acute





(6) Scrotal edema


Status: Chronic








- Assessment and Plan (Free Text)


Assessment: 


(1) DVT prophylaxis


Assessment & Plan: 


scd and aehose


ambulation


Status: Acute





(2) CAD (coronary artery disease)


Assessment & Plan: 


s/p cardiac arrest


cont meds


Status: Acute





(3) Smoking


Assessment & Plan: 


nicoderm


Status: Chronic





(4) Low back pain


Status: Acute





(5) Agitation


Assessment & Plan: 


rt/ neuro congnitive d/o


cont seroquel/depakote


ativna/hadol prn


Status: Acute

## 2016-05-16 RX ADMIN — DIVALPROEX SODIUM SCH MG: 500 TABLET, DELAYED RELEASE ORAL at 16:28

## 2016-05-16 RX ADMIN — DIVALPROEX SODIUM SCH MG: 500 TABLET, DELAYED RELEASE ORAL at 09:15

## 2016-05-16 NOTE — CP.PCM.PN
Subjective





- Date & Time of Evaluation


Date of Evaluation: 05/16/16


Time of Evaluation: 07:15





- Subjective


Subjective: 


pt comfortable, no distress, no f/c, n/v/d


calm adn cooperative


ros negative


court date 5/26/16





Objective





- Vital Signs/Intake and Output


Vital Signs (last 24 hours): 


 











Temp Pulse Resp BP Pulse Ox


 


 97.2 F L  63   19   140/76   99 


 


 05/16/16 00:16  05/16/16 00:16  05/16/16 00:16  05/16/16 00:16  05/16/16 00:16











- Medications


Medications: 


 Current Medications





Aspirin (Ecotrin)  81 mg PO DAILY [facility]


   Last Admin: 05/15/16 09:11 Dose:  81 mg


Atorvastatin Calcium (Lipitor)  40 mg PO DAILY [facility]


   Last Admin: 05/15/16 09:10 Dose:  40 mg


Divalproex Sodium (Depakote Dr(*Bid*))  500 mg PO BID [facility]


   Last Admin: 05/15/16 16:30 Dose:  500 mg


Enalapril Maleate (Vasotec)  10 mg PO DAILY [facility]


   Last Admin: 05/15/16 09:11 Dose:  10 mg


Famotidine (Pepcid)  20 mg PO BID [facility]


   Last Admin: 05/15/16 16:30 Dose:  20 mg


Haloperidol Lactate (Haldol)  5 mg IM Q6 PRN


   PRN Reason: Agitation


   Last Admin: 04/20/16 17:32 Dose:  5 mg


Ibuprofen (Motrin Tab)  600 mg PO Q8 PRN


   PRN Reason: Pain, moderate (4-7)


   Last Admin: 04/02/16 09:07 Dose:  600 mg


Levetiracetam (Keppra)  500 mg PO BID [facility]


   Last Admin: 05/15/16 16:30 Dose:  500 mg


Lorazepam (Ativan)  2 mg IM Q6 PRN


   PRN Reason: Agitation


Metoprolol Tartrate (Lopressor)  50 mg PO Q12 [facility]


   Last Admin: 05/15/16 21:45 Dose:  50 mg


Nicotine (Nicoderm Cq)  1 patch TD DAILY [facility]


   Last Admin: 05/15/16 09:11 Dose:  1 patch


Quetiapine Fumarate (Seroquel)  25 mg PO HS [facility]


   Last Admin: 05/15/16 21:45 Dose:  25 mg











- Labs


Labs: 


 





 03/19/16 05:30 





 03/19/16 05:30 





 











PT  11.2 SECONDS (9.4-12.0)   12/14/15  05:20    


 


INR  1.05  (0.89-1.20)   12/14/15  05:20    


 


APTT  36.2 SECONDS (23.1-32.0)  H  12/14/15  05:20    














- Constitutional


Appears: Well, Non-toxic, No Acute Distress





- Head Exam


Head Exam: ATRAUMATIC, NORMAL INSPECTION, NORMOCEPHALIC





- Eye Exam


Eye Exam: EOMI, Normal appearance, PERRL


Pupil Exam: NORMAL ACCOMODATION, PERRL





- ENT Exam


ENT Exam: Mucous Membranes Moist, Normal Exam





- Neck Exam


Neck Exam: Full ROM, Normal Inspection.  absent: Lymphadenopathy





- Respiratory Exam


Respiratory Exam: Clear to Ausculation Bilateral, NORMAL BREATHING PATTERN





- Cardiovascular Exam


Cardiovascular Exam: REGULAR RHYTHM, +S1, +S2.  absent: Murmur





- GI/Abdominal Exam


GI & Abdominal Exam: Soft, Normal Bowel Sounds.  absent: Tenderness





- Extremities Exam


Extremities Exam: Full ROM, Normal Capillary Refill, Normal Inspection.  absent

: Joint Swelling, Pedal Edema





- Back Exam


Back Exam: NORMAL INSPECTION





- Neurological Exam


Neurological Exam: Alert, Awake, CN II-XII Intact, Normal Gait, Oriented x3





- Psychiatric Exam


Psychiatric exam: Normal Affect, Normal Mood





- Skin


Skin Exam: Dry, Intact, Normal Color, Warm





Assessment and Plan


(1) DVT prophylaxis


Assessment & Plan: 


scd adn ae hose, ambulation


Status: Acute





(2) CAD (coronary artery disease)


Assessment & Plan: 


s/p cardiac arrest


cont meds


Status: Acute





(3) Smoking


Assessment & Plan: 


nicoderm


Status: Chronic





(4) Low back pain


Assessment & Plan: 


ibupforen prn


Status: Acute





(5) Agitation


Assessment & Plan: 


r/t neurocognitive d/o


seroquel/depakote


ativan/haldol prn


Status: Acute





(6) Scrotal edema


Status: Chronic

## 2016-05-17 RX ADMIN — DIVALPROEX SODIUM SCH MG: 500 TABLET, DELAYED RELEASE ORAL at 09:10

## 2016-05-17 RX ADMIN — DIVALPROEX SODIUM SCH MG: 500 TABLET, DELAYED RELEASE ORAL at 16:15

## 2016-05-17 NOTE — CP.PCM.PN
Subjective





- Date & Time of Evaluation


Date of Evaluation: 05/17/16


Time of Evaluation: 07:33





- Subjective


Subjective: 


pt comfortable in bed, no distress, no compalints. no f/c, n/v/d


ros negative


calm adn cooperative


court date 5/26/16


pt remains ubale to care for self





Objective





- Vital Signs/Intake and Output


Vital Signs (last 24 hours): 


 











Temp Pulse Resp BP Pulse Ox


 


 97.3 F L  69   20   115/79   100 


 


 05/16/16 17:12  05/16/16 21:47  05/16/16 17:12  05/16/16 21:47  05/16/16 17:12











- Medications


Medications: 


 Current Medications





Aspirin (Ecotrin)  81 mg PO DAILY [facility]


   Last Admin: 05/16/16 09:16 Dose:  81 mg


Atorvastatin Calcium (Lipitor)  40 mg PO DAILY [facility]


   Last Admin: 05/16/16 09:16 Dose:  40 mg


Divalproex Sodium (Depakote Dr(*Bid*))  500 mg PO BID [facility]


   Last Admin: 05/16/16 16:28 Dose:  500 mg


Enalapril Maleate (Vasotec)  10 mg PO DAILY [facility]


   Last Admin: 05/16/16 09:16 Dose:  10 mg


Famotidine (Pepcid)  20 mg PO BID [facility]


   Last Admin: 05/16/16 16:28 Dose:  20 mg


Haloperidol Lactate (Haldol)  5 mg IM Q6 PRN


   PRN Reason: Agitation


   Last Admin: 04/20/16 17:32 Dose:  5 mg


Ibuprofen (Motrin Tab)  600 mg PO Q8 PRN


   PRN Reason: Pain, moderate (4-7)


   Last Admin: 04/02/16 09:07 Dose:  600 mg


Levetiracetam (Keppra)  500 mg PO BID [facility]


   Last Admin: 05/16/16 16:28 Dose:  500 mg


Lorazepam (Ativan)  2 mg IM Q6 PRN


   PRN Reason: Agitation


Metoprolol Tartrate (Lopressor)  50 mg PO Q12 [facility]


   Last Admin: 05/16/16 21:47 Dose:  50 mg


Nicotine (Nicoderm Cq)  1 patch TD DAILY [facility]


   Last Admin: 05/16/16 09:15 Dose:  1 patch


Quetiapine Fumarate (Seroquel)  25 mg PO HS [facility]


   Last Admin: 05/16/16 21:47 Dose:  25 mg











- Labs


Labs: 


 





 03/19/16 05:30 





 03/19/16 05:30 





 











PT  11.2 SECONDS (9.4-12.0)   12/14/15  05:20    


 


INR  1.05  (0.89-1.20)   12/14/15  05:20    


 


APTT  36.2 SECONDS (23.1-32.0)  H  12/14/15  05:20    














- Constitutional


Appears: Well, Non-toxic, No Acute Distress





- Head Exam


Head Exam: ATRAUMATIC, NORMAL INSPECTION, NORMOCEPHALIC





- Eye Exam


Eye Exam: EOMI, Normal appearance, PERRL


Pupil Exam: NORMAL ACCOMODATION, PERRL





- ENT Exam


ENT Exam: Mucous Membranes Moist, Normal Exam





- Neck Exam


Neck Exam: Full ROM, Normal Inspection.  absent: Lymphadenopathy





- Respiratory Exam


Respiratory Exam: Clear to Ausculation Bilateral, NORMAL BREATHING PATTERN





- Cardiovascular Exam


Cardiovascular Exam: REGULAR RHYTHM, +S1, +S2.  absent: Murmur





- GI/Abdominal Exam


GI & Abdominal Exam: Soft, Normal Bowel Sounds.  absent: Tenderness





- Extremities Exam


Extremities Exam: Full ROM, Normal Capillary Refill, Normal Inspection.  absent

: Joint Swelling, Pedal Edema





- Back Exam


Back Exam: NORMAL INSPECTION





- Neurological Exam


Neurological Exam: Alert, Awake, CN II-XII Intact, Normal Gait, Oriented x3





- Psychiatric Exam


Psychiatric exam: Normal Affect, Normal Mood





- Skin


Skin Exam: Dry, Intact, Normal Color, Warm





Assessment and Plan


(1) DVT prophylaxis


Assessment & Plan: 


scd and ae hsoe


ambulation


Status: Acute





(2) CAD (coronary artery disease)


Assessment & Plan: 


s/p cardiac arrest


cont all meds


Status: Acute





(3) Smoking


Assessment & Plan: 


nicoderm


Status: Chronic





(4) Low back pain


Assessment & Plan: 


ibuprofen prn


Status: Acute





(5) Agitation


Assessment & Plan: 


r/t neurocognitive d/o


seroquel/depakote


ativan/haldol prn


Status: Acute





(6) Scrotal edema


Status: Chronic

## 2016-05-18 RX ADMIN — DIVALPROEX SODIUM SCH MG: 500 TABLET, DELAYED RELEASE ORAL at 09:01

## 2016-05-18 RX ADMIN — DIVALPROEX SODIUM SCH MG: 500 TABLET, DELAYED RELEASE ORAL at 17:23

## 2016-05-18 NOTE — CP.PCM.PN
Subjective





- Date & Time of Evaluation


Date of Evaluation: 05/18/16


Time of Evaluation: 06:55





- Subjective


Subjective: 


pt comfortable in bed, no complaints.  no f/c, nvd. calma dn cooperative


court date 5/26/16


ros negative





Objective





- Vital Signs/Intake and Output


Vital Signs (last 24 hours): 


 











Temp Pulse Resp BP Pulse Ox


 


 97.7 F   71   20   126/76   97 


 


 05/17/16 16:58  05/17/16 21:00  05/17/16 16:58  05/17/16 21:00  05/17/16 16:58











- Medications


Medications: 


 Current Medications





Aspirin (Ecotrin)  81 mg PO DAILY [facility]


   Last Admin: 05/17/16 09:10 Dose:  81 mg


Atorvastatin Calcium (Lipitor)  40 mg PO DAILY [facility]


   Last Admin: 05/17/16 16:14 Dose:  40 mg


Divalproex Sodium (Depakote Dr(*Bid*))  500 mg PO BID [facility]


   Last Admin: 05/17/16 16:15 Dose:  500 mg


Enalapril Maleate (Vasotec)  10 mg PO DAILY [facility]


   Last Admin: 05/17/16 09:10 Dose:  10 mg


Famotidine (Pepcid)  20 mg PO BID [facility]


   Last Admin: 05/17/16 16:14 Dose:  20 mg


Haloperidol Lactate (Haldol)  5 mg IM Q6 PRN


   PRN Reason: Agitation


   Last Admin: 04/20/16 17:32 Dose:  5 mg


Ibuprofen (Motrin Tab)  600 mg PO Q8 PRN


   PRN Reason: Pain, moderate (4-7)


   Last Admin: 04/02/16 09:07 Dose:  600 mg


Levetiracetam (Keppra)  500 mg PO BID [facility]


   Last Admin: 05/17/16 16:15 Dose:  500 mg


Lorazepam (Ativan)  2 mg IM Q6 PRN


   PRN Reason: Agitation


Metoprolol Tartrate (Lopressor)  50 mg PO Q12 [facility]


   Last Admin: 05/17/16 21:00 Dose:  50 mg


Nicotine (Nicoderm Cq)  1 patch TD DAILY [facility]


   Last Admin: 05/17/16 09:12 Dose:  1 patch


Quetiapine Fumarate (Seroquel)  25 mg PO HS [facility]


   Last Admin: 05/17/16 21:00 Dose:  25 mg











- Labs


Labs: 


 





 03/19/16 05:30 





 03/19/16 05:30 





 











PT  11.2 SECONDS (9.4-12.0)   12/14/15  05:20    


 


INR  1.05  (0.89-1.20)   12/14/15  05:20    


 


APTT  36.2 SECONDS (23.1-32.0)  H  12/14/15  05:20    














- Constitutional


Appears: Well, Non-toxic, No Acute Distress





- Head Exam


Head Exam: ATRAUMATIC, NORMAL INSPECTION, NORMOCEPHALIC





- Eye Exam


Eye Exam: EOMI, Normal appearance, PERRL


Pupil Exam: NORMAL ACCOMODATION, PERRL





- ENT Exam


ENT Exam: Mucous Membranes Moist, Normal Exam





- Neck Exam


Neck Exam: Full ROM, Normal Inspection.  absent: Lymphadenopathy





- Respiratory Exam


Respiratory Exam: Clear to Ausculation Bilateral, NORMAL BREATHING PATTERN





- Cardiovascular Exam


Cardiovascular Exam: REGULAR RHYTHM, +S1, +S2.  absent: Murmur





- GI/Abdominal Exam


GI & Abdominal Exam: Soft, Normal Bowel Sounds.  absent: Tenderness





- Extremities Exam


Extremities Exam: Full ROM, Normal Capillary Refill, Normal Inspection.  absent

: Joint Swelling, Pedal Edema





- Back Exam


Back Exam: NORMAL INSPECTION





- Neurological Exam


Neurological Exam: Alert, Awake, CN II-XII Intact, Normal Gait, Oriented x3





- Psychiatric Exam


Psychiatric exam: Normal Affect, Normal Mood





- Skin


Skin Exam: Dry, Intact, Normal Color, Warm





Assessment and Plan


(1) DVT prophylaxis


Assessment & Plan: 


scd and aehose


ambulation


Status: Acute





(2) CAD (coronary artery disease)


Assessment & Plan: 


cont meds


Status: Acute





(3) Smoking


Assessment & Plan: 


nicoderm


Status: Chronic





(4) Low back pain


Assessment & Plan: 


ibuprofen prn


Status: Acute





(5) Agitation


Assessment & Plan: 


seroquel/depakote


ativan haldol prn


Status: Acute





(6) Scrotal edema


Status: Chronic

## 2016-05-19 RX ADMIN — DIVALPROEX SODIUM SCH MG: 500 TABLET, DELAYED RELEASE ORAL at 08:35

## 2016-05-19 RX ADMIN — DIVALPROEX SODIUM SCH MG: 500 TABLET, DELAYED RELEASE ORAL at 16:19

## 2016-05-19 NOTE — CP.PCM.PN
Subjective





- Date & Time of Evaluation


Date of Evaluation: 05/19/16


Time of Evaluation: 07:01





- Subjective


Subjective: 


pt denies complaints. no f/c, n/v/d. calm and cooperative. 


ros negative


court date 5/26/16





Objective





- Vital Signs/Intake and Output


Vital Signs (last 24 hours): 


 











Temp Pulse Resp BP Pulse Ox


 


 97.5 F L  72   18   144/77   100 


 


 05/18/16 16:38  05/18/16 22:05  05/18/16 16:38  05/18/16 22:05  05/18/16 16:38











- Medications


Medications: 


 Current Medications





Aspirin (Ecotrin)  81 mg PO DAILY [facility]


   Last Admin: 05/18/16 09:06 Dose:  81 mg


Atorvastatin Calcium (Lipitor)  40 mg PO DAILY [facility]


   Last Admin: 05/18/16 09:06 Dose:  40 mg


Divalproex Sodium (Depakote Dr(*Bid*))  500 mg PO BID [facility]


   Last Admin: 05/18/16 17:23 Dose:  500 mg


Enalapril Maleate (Vasotec)  10 mg PO DAILY [facility]


   Last Admin: 05/18/16 09:01 Dose:  10 mg


Famotidine (Pepcid)  20 mg PO BID [facility]


   Last Admin: 05/18/16 17:24 Dose:  20 mg


Haloperidol Lactate (Haldol)  5 mg IM Q6 PRN


   PRN Reason: Agitation


   Last Admin: 04/20/16 17:32 Dose:  5 mg


Ibuprofen (Motrin Tab)  600 mg PO Q8 PRN


   PRN Reason: Pain, moderate (4-7)


   Last Admin: 04/02/16 09:07 Dose:  600 mg


Levetiracetam (Keppra)  500 mg PO BID [facility]


   Last Admin: 05/18/16 17:23 Dose:  500 mg


Lorazepam (Ativan)  2 mg IM Q6 PRN


   PRN Reason: Agitation


Metoprolol Tartrate (Lopressor)  50 mg PO Q12 [facility]


   Last Admin: 05/18/16 22:05 Dose:  50 mg


Nicotine (Nicoderm Cq)  1 patch TD DAILY [facility]


   Last Admin: 05/18/16 09:02 Dose:  1 patch


Quetiapine Fumarate (Seroquel)  25 mg PO HS [facility]


   Last Admin: 05/18/16 22:04 Dose:  25 mg











- Labs


Labs: 


 





 03/19/16 05:30 





 03/19/16 05:30 





 











PT  11.2 SECONDS (9.4-12.0)   12/14/15  05:20    


 


INR  1.05  (0.89-1.20)   12/14/15  05:20    


 


APTT  36.2 SECONDS (23.1-32.0)  H  12/14/15  05:20    














- Constitutional


Appears: Well, Non-toxic, No Acute Distress





- Head Exam


Head Exam: ATRAUMATIC, NORMAL INSPECTION, NORMOCEPHALIC





- Eye Exam


Eye Exam: EOMI, Normal appearance, PERRL


Pupil Exam: NORMAL ACCOMODATION, PERRL





- ENT Exam


ENT Exam: Mucous Membranes Moist, Normal Exam





- Neck Exam


Neck Exam: Full ROM, Normal Inspection.  absent: Lymphadenopathy





- Respiratory Exam


Respiratory Exam: Clear to Ausculation Bilateral, NORMAL BREATHING PATTERN





- Cardiovascular Exam


Cardiovascular Exam: REGULAR RHYTHM, +S1, +S2.  absent: Murmur





- GI/Abdominal Exam


GI & Abdominal Exam: Soft, Normal Bowel Sounds.  absent: Tenderness





- Extremities Exam


Extremities Exam: Full ROM, Normal Capillary Refill, Normal Inspection.  absent

: Joint Swelling, Pedal Edema





- Back Exam


Back Exam: NORMAL INSPECTION





- Neurological Exam


Neurological Exam: Alert, Awake, CN II-XII Intact, Normal Gait, Oriented x3





- Psychiatric Exam


Psychiatric exam: Normal Affect, Normal Mood





- Skin


Skin Exam: Dry, Intact, Normal Color, Warm





Assessment and Plan


(1) DVT prophylaxis


Assessment & Plan: 


scd and aehose, ambulation


Status: Acute





(2) CAD (coronary artery disease)


Assessment & Plan: 


s/p cardiac arrest


cont meds


Status: Acute





(3) Smoking


Assessment & Plan: 


nicoderm


Status: Chronic





(4) Low back pain


Assessment & Plan: 


ibuprofen prn


Status: Acute





(5) Agitation


Assessment & Plan: 


r/t neurocognitive d/o


seroquel/depakote


ativan/haldol prn


Status: Acute





(6) Scrotal edema


Status: Chronic

## 2016-05-20 RX ADMIN — DIVALPROEX SODIUM SCH MG: 500 TABLET, DELAYED RELEASE ORAL at 16:27

## 2016-05-20 RX ADMIN — DIVALPROEX SODIUM SCH MG: 500 TABLET, DELAYED RELEASE ORAL at 08:50

## 2016-05-20 NOTE — CP.PCM.PN
Subjective





- Date & Time of Evaluation


Date of Evaluation: 05/20/16


Time of Evaluation: 08:27





- Subjective


Subjective: 


pt comfortable in bed, no f/c, n/v/d. calm and cooperative. still unable to 

care for self. court date 5/26/16


ros negative





Objective





- Vital Signs/Intake and Output


Vital Signs (last 24 hours): 


 











Temp Pulse Resp BP Pulse Ox


 


 97.3 F L  65   20   112/73   100 


 


 05/20/16 07:38  05/20/16 07:38  05/20/16 07:38  05/20/16 07:38  05/20/16 07:38











- Medications


Medications: 


 Current Medications





Aspirin (Ecotrin)  81 mg PO DAILY [facility]


   Last Admin: 05/19/16 08:34 Dose:  81 mg


Atorvastatin Calcium (Lipitor)  40 mg PO DAILY [facility]


   Last Admin: 05/19/16 08:33 Dose:  40 mg


Divalproex Sodium (Depakote Dr(*Bid*))  500 mg PO BID [facility]


   Last Admin: 05/19/16 16:19 Dose:  500 mg


Enalapril Maleate (Vasotec)  10 mg PO DAILY [facility]


   Last Admin: 05/19/16 08:34 Dose:  10 mg


Famotidine (Pepcid)  20 mg PO BID [facility]


   Last Admin: 05/19/16 16:19 Dose:  20 mg


Haloperidol Lactate (Haldol)  5 mg IM Q6 PRN


   PRN Reason: Agitation


   Last Admin: 04/20/16 17:32 Dose:  5 mg


Ibuprofen (Motrin Tab)  600 mg PO Q8 PRN


   PRN Reason: Pain, moderate (4-7)


   Last Admin: 04/02/16 09:07 Dose:  600 mg


Levetiracetam (Keppra)  500 mg PO BID [facility]


   Last Admin: 05/19/16 16:19 Dose:  500 mg


Lorazepam (Ativan)  2 mg IM Q6 PRN


   PRN Reason: Agitation


Metoprolol Tartrate (Lopressor)  50 mg PO Q12 [facility]


   Last Admin: 05/19/16 21:09 Dose:  50 mg


Nicotine (Nicoderm Cq)  1 patch TD DAILY [facility]


   Last Admin: 05/19/16 08:34 Dose:  1 patch


Quetiapine Fumarate (Seroquel)  25 mg PO HS [facility]


   Last Admin: 05/19/16 21:09 Dose:  25 mg











- Labs


Labs: 


 





 03/19/16 05:30 





 03/19/16 05:30 





 











PT  11.2 SECONDS (9.4-12.0)   12/14/15  05:20    


 


INR  1.05  (0.89-1.20)   12/14/15  05:20    


 


APTT  36.2 SECONDS (23.1-32.0)  H  12/14/15  05:20    














- Constitutional


Appears: Well, Non-toxic, No Acute Distress





- Head Exam


Head Exam: ATRAUMATIC, NORMAL INSPECTION, NORMOCEPHALIC





- Eye Exam


Eye Exam: EOMI, Normal appearance, PERRL


Pupil Exam: NORMAL ACCOMODATION, PERRL





- ENT Exam


ENT Exam: Mucous Membranes Moist, Normal Exam





- Neck Exam


Neck Exam: Full ROM, Normal Inspection.  absent: Lymphadenopathy





- Respiratory Exam


Respiratory Exam: Clear to Ausculation Bilateral, NORMAL BREATHING PATTERN





- Cardiovascular Exam


Cardiovascular Exam: REGULAR RHYTHM, +S1, +S2.  absent: Murmur





- GI/Abdominal Exam


GI & Abdominal Exam: Soft, Normal Bowel Sounds.  absent: Tenderness





- Extremities Exam


Extremities Exam: Full ROM, Normal Capillary Refill, Normal Inspection.  absent

: Joint Swelling, Pedal Edema





- Back Exam


Back Exam: NORMAL INSPECTION





- Neurological Exam


Neurological Exam: Alert, Awake, CN II-XII Intact, Normal Gait, Oriented x3





- Psychiatric Exam


Psychiatric exam: Normal Affect, Normal Mood





- Skin


Skin Exam: Dry, Intact, Normal Color, Warm





Assessment and Plan


(1) DVT prophylaxis


Assessment & Plan: 


scd and ae hsoe, ambulation


Status: Acute





(2) CAD (coronary artery disease)


Assessment & Plan: 


s/p cardiac arrest


cont meds


Status: Acute





(3) Smoking


Assessment & Plan: 


nicoderm


Status: Chronic





(4) Low back pain


Assessment & Plan: 


ibuprofen prn


Status: Acute





(5) Agitation


Assessment & Plan: 


r/t neurocognitive d/o


seroquel/depakote


ativan/haldol prn


Status: Acute





(6) Scrotal edema


Status: Chronic

## 2016-05-21 RX ADMIN — DIVALPROEX SODIUM SCH MG: 500 TABLET, DELAYED RELEASE ORAL at 16:33

## 2016-05-21 RX ADMIN — DIVALPROEX SODIUM SCH MG: 500 TABLET, DELAYED RELEASE ORAL at 09:03

## 2016-05-21 NOTE — CP.PCM.PN
Subjective





- Date & Time of Evaluation


Date of Evaluation: 05/21/16


Time of Evaluation: 13:49





- Subjective


Subjective: 


dictation # [id number]


calm and cooperative


pending court date





Objective





- Vital Signs/Intake and Output


Vital Signs (last 24 hours): 


 











Temp Pulse Resp BP Pulse Ox


 


 98 F   68   18   121/72   98 


 


 05/21/16 08:21  05/21/16 09:03  05/21/16 08:21  05/21/16 09:03  05/21/16 08:21











- Medications


Medications: 


 Current Medications





Aspirin (Ecotrin)  81 mg PO DAILY [facility]


   Last Admin: 05/21/16 09:03 Dose:  81 mg


Divalproex Sodium (Depakote Dr(*Bid*))  500 mg PO BID [facility]


   Last Admin: 05/21/16 09:03 Dose:  500 mg


Enalapril Maleate (Vasotec)  10 mg PO DAILY [facility]


   Last Admin: 05/21/16 09:05 Dose:  10 mg


Famotidine (Pepcid)  20 mg PO BID [facility]


   Last Admin: 05/21/16 09:05 Dose:  20 mg


Haloperidol Lactate (Haldol)  5 mg IM Q6 PRN


   PRN Reason: Agitation


   Last Admin: 04/20/16 17:32 Dose:  5 mg


Ibuprofen (Motrin Tab)  600 mg PO Q8 PRN


   PRN Reason: Pain, moderate (4-7)


   Last Admin: 04/02/16 09:07 Dose:  600 mg


Levetiracetam (Keppra)  500 mg PO BID [facility]


   Last Admin: 05/21/16 09:03 Dose:  500 mg


Lorazepam (Ativan)  2 mg IM Q6 PRN


   PRN Reason: Agitation


Metoprolol Tartrate (Lopressor)  50 mg PO Q12 [facility]


   Last Admin: 05/21/16 09:03 Dose:  50 mg


Nicotine (Nicoderm Cq)  1 patch TD DAILY [facility]


   Last Admin: 05/21/16 09:04 Dose:  1 patch


Quetiapine Fumarate (Seroquel)  25 mg PO HS [facility]


   Last Admin: 05/20/16 22:11 Dose:  25 mg











- Labs


Labs: 


 





 03/19/16 05:30 





 03/19/16 05:30 





 











PT  11.2 SECONDS (9.4-12.0)   12/14/15  05:20    


 


INR  1.05  (0.89-1.20)   12/14/15  05:20    


 


APTT  36.2 SECONDS (23.1-32.0)  H  12/14/15  05:20    














Assessment and Plan


(1) DVT prophylaxis


Status: Acute





(2) CAD (coronary artery disease)


Status: Acute





(3) Smoking


Status: Chronic





(4) Low back pain


Status: Acute





(5) Agitation


Status: Acute





(6) Scrotal edema


Status: Chronic

## 2016-05-21 NOTE — PN
DATE: 05/21/2016



SUBJECTIVE:  The patient in bed, no complaints.  No distress, no agitation.  No fever, chills, nausea
, vomiting, diarrhea.  The patient remains pending court date, 5/26/16.  The patient remains unable t
o make decisions for himself.



REVIEW OF SYSTEMS:  Negative except as mentioned in HPI.



OBJECTIVE FINDINGS:

GENERAL:  Alert and oriented.  The patient's baseline.

HEART:  Regular rate and rhythm.  No murmurs, rubs or gallops.

LUNGS:  Clear in all fields bilaterally.  Respirations even and nonlabored.

ABDOMEN:  Soft, nontender.  Bowel sounds x 4.

EXTREMITIES:  Distal pulses, motor sensation intact.  Cap refill is brisk.



DIAGNOSES AND PLAN:  The patient has coronary artery disease, status post cardiac arrest.  Continue a
ll medications.  Deep venous thrombosis prophylaxis, sequential compression devices and AE hose, ambu
lation.  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  Continue Seroquel, Depakote.  Ativan and Hald
ol are used p.r.n.  The patient will continue Nicoderm patch.  As stated, the patient remains unable 
to make decisions for himself, pending guardianship on court date 5/26/16 for placement.  We will con
tinue to follow with .





__________________________________________

Fan SILVER







cc:



DD: 05/21/2016 13:51:41  [id number]

TT: 05/21/2016 14:14:49

Confirmation # [id number]

Dictation # [id number]

tn

## 2016-05-22 RX ADMIN — DIVALPROEX SODIUM SCH MG: 500 TABLET, DELAYED RELEASE ORAL at 17:34

## 2016-05-22 RX ADMIN — DIVALPROEX SODIUM SCH MG: 500 TABLET, DELAYED RELEASE ORAL at 10:21

## 2016-05-22 NOTE — CP.PCM.PN
Subjective





- Date & Time of Evaluation


Date of Evaluation: 05/22/16


Time of Evaluation: 11:36





- Subjective


Subjective: 


pt comfortable in bed, nod istress, no complaints. no f/c, n/v/d


pending court date





Objective





- Vital Signs/Intake and Output


Vital Signs (last 24 hours): 


 











Temp Pulse Resp BP Pulse Ox


 


 97.7 F   61   18   119/70   97 


 


 05/22/16 08:48  05/22/16 08:48  05/22/16 08:48  05/22/16 10:20  05/22/16 08:48











- Medications


Medications: 


 Current Medications





Aspirin (Ecotrin)  81 mg PO DAILY [facility]


   Last Admin: 05/22/16 10:21 Dose:  81 mg


Divalproex Sodium (Depakote Dr(*Bid*))  500 mg PO BID [facility]


   Last Admin: 05/22/16 10:21 Dose:  500 mg


Enalapril Maleate (Vasotec)  10 mg PO DAILY [facility]


   Last Admin: 05/22/16 10:21 Dose:  10 mg


Famotidine (Pepcid)  20 mg PO BID [facility]


   Last Admin: 05/22/16 10:20 Dose:  20 mg


Haloperidol Lactate (Haldol)  5 mg IM Q6 PRN


   PRN Reason: Agitation


   Last Admin: 04/20/16 17:32 Dose:  5 mg


Ibuprofen (Motrin Tab)  600 mg PO Q8 PRN


   PRN Reason: Pain, moderate (4-7)


   Last Admin: 04/02/16 09:07 Dose:  600 mg


Levetiracetam (Keppra)  500 mg PO BID [facility]


   Last Admin: 05/22/16 10:21 Dose:  500 mg


Lorazepam (Ativan)  2 mg IM Q6 PRN


   PRN Reason: Agitation


Metoprolol Tartrate (Lopressor)  50 mg PO Q12 [facility]


   Last Admin: 05/22/16 10:20 Dose:  50 mg


Nicotine (Nicoderm Cq)  1 patch TD DAILY [facility]


   Last Admin: 05/22/16 10:20 Dose:  1 patch


Quetiapine Fumarate (Seroquel)  25 mg PO HS [facility]


   Last Admin: 05/21/16 21:54 Dose:  25 mg











- Labs


Labs: 


 





 03/19/16 05:30 





 03/19/16 05:30 





 











PT  11.2 SECONDS (9.4-12.0)   12/14/15  05:20    


 


INR  1.05  (0.89-1.20)   12/14/15  05:20    


 


APTT  36.2 SECONDS (23.1-32.0)  H  12/14/15  05:20    














- Constitutional


Appears: Well, Non-toxic, No Acute Distress





- Head Exam


Head Exam: ATRAUMATIC, NORMAL INSPECTION, NORMOCEPHALIC





- Eye Exam


Eye Exam: EOMI, Normal appearance, PERRL


Pupil Exam: NORMAL ACCOMODATION, PERRL





- ENT Exam


ENT Exam: Mucous Membranes Moist, Normal Exam





- Neck Exam


Neck Exam: Full ROM, Normal Inspection.  absent: Lymphadenopathy





- Respiratory Exam


Respiratory Exam: Clear to Ausculation Bilateral, NORMAL BREATHING PATTERN





- Cardiovascular Exam


Cardiovascular Exam: REGULAR RHYTHM, +S1, +S2.  absent: Murmur





- GI/Abdominal Exam


GI & Abdominal Exam: Soft, Normal Bowel Sounds.  absent: Tenderness





- Extremities Exam


Extremities Exam: Full ROM, Normal Capillary Refill, Normal Inspection.  absent

: Joint Swelling, Pedal Edema





- Back Exam


Back Exam: NORMAL INSPECTION





- Neurological Exam


Neurological Exam: Alert, Awake, CN II-XII Intact, Normal Gait, Oriented x3





- Psychiatric Exam


Psychiatric exam: Normal Affect, Normal Mood





- Skin


Skin Exam: Dry, Intact, Normal Color, Warm





Assessment and Plan


(1) DVT prophylaxis


Status: Acute





(2) CAD (coronary artery disease)


Status: Acute





(3) Smoking


Status: Chronic





(4) Low back pain


Status: Acute





(5) Agitation


Status: Acute





(6) Scrotal edema


Status: Chronic








- Assessment and Plan (Free Text)


Assessment: 


(1) DVT prophylaxis


Assessment & Plan: 


scd and aehose, ambulation


Status: Acute





(2) CAD (coronary artery disease)


Assessment & Plan: 


s/p cardiac arrest


cont meds


Status: Acute





(3) Smoking


Assessment & Plan: 


nicoderm


Status: Chronic





(4) Low back pain


Assessment & Plan: 


ibuprofen prn


Status: Acute





(5) Agitation


Assessment & Plan: 


r/t neurocognitive d/o


seroquel/depakote


ativan/haldol prn


Status: Acute

## 2016-05-23 RX ADMIN — DIVALPROEX SODIUM SCH MG: 500 TABLET, DELAYED RELEASE ORAL at 16:04

## 2016-05-23 RX ADMIN — DIVALPROEX SODIUM SCH MG: 500 TABLET, DELAYED RELEASE ORAL at 08:33

## 2016-05-23 NOTE — CP.PCM.PN
Subjective





- Date & Time of Evaluation


Date of Evaluation: 05/23/16


Time of Evaluation: 07:34





- Subjective


Subjective: 


pt comfortable in bed, no dsitress. no copmlaints.  no f/c, n/v/d


ros negative. pending court date





Objective





- Vital Signs/Intake and Output


Vital Signs (last 24 hours): 


 











Temp Pulse Resp BP Pulse Ox


 


 97.9 F   65   19   134/71   100 


 


 05/23/16 00:00  05/23/16 00:00  05/23/16 00:00  05/23/16 00:00  05/23/16 00:00











- Medications


Medications: 


 Current Medications





Aspirin (Ecotrin)  [facility]ILY [facility]


   Last Admin: 05/22/16 10:21 Dose:  81 mg


Divalproex Sodium (Depakote Dr(*Bid*))  500 mg PO BID [facility]


   Last Admin: 05/22/16 17:34 Dose:  500 mg


Enalapril Maleate (Vasotec)  10 mg PO DAILY [facility]


   Last Admin: 05/22/16 10:21 Dose:  10 mg


Famotidine (Pepcid)  20 mg PO BID [facility]


   Last Admin: 05/22/16 17:34 Dose:  20 mg


Haloperidol Lactate (Haldol)  5 mg IM Q6 PRN


   PRN Reason: Agitation


   Last Admin: 04/20/16 17:32 Dose:  5 mg


Ibuprofen (Motrin Tab)  600 mg PO Q8 PRN


   PRN Reason: Pain, moderate (4-7)


   Last Admin: 04/02/16 09:07 Dose:  600 mg


Levetiracetam (Keppra)  500 mg PO BID [facility]


   Last Admin: 05/22/16 17:34 Dose:  500 mg


Lorazepam (Ativan)  2 mg IM Q6 PRN


   PRN Reason: Agitation


Metoprolol Tartrate (Lopressor)  50 mg PO Q12 [facility]


   Last Admin: 05/22/16 21:41 Dose:  50 mg


Nicotine (Nicoderm Cq)  1 patch TD [facility]ILY [facility]


   Last Admin: 05/22/16 10:20 Dose:  1 patch


Quetiapine Fumarate (Seroquel)  25 mg PO HS [facility]


   Last Admin: 05/22/16 21:41 Dose:  25 mg











- Labs


Labs: 


 





 03/19/16 05:30 





 03/19/16 05:30 





 











PT  11.2 SECONDS (9.4-12.0)   12/14/15  05:20    


 


INR  1.05  (0.89-1.20)   12/14/15  05:20    


 


APTT  36.2 SECONDS (23.1-32.0)  H  12/14/15  05:20    














- Constitutional


Appears: Well, Non-toxic, No Acute Distress





- Head Exam


Head Exam: ATRAUMATIC, NORMAL INSPECTION, NORMOCEPHALIC





- Eye Exam


Eye Exam: EOMI, Normal appearance, PERRL


Pupil Exam: NORMAL ACCOMODATION, PERRL





- ENT Exam


ENT Exam: Mucous Membranes Moist, Normal Exam





- Neck Exam


Neck Exam: Full ROM, Normal Inspection.  absent: Lymphadenopathy





- Respiratory Exam


Respiratory Exam: Clear to Ausculation Bilateral, NORMAL BREATHING PATTERN





- Cardiovascular Exam


Cardiovascular Exam: REGULAR RHYTHM, +S1, +S2.  absent: Murmur





- GI/Abdominal Exam


GI & Abdominal Exam: Soft, Normal Bowel Sounds.  absent: Tenderness





- Rectal Exam


Rectal Exam: NORMAL INSPECTION





- Extremities Exam


Extremities Exam: Full ROM, Normal Capillary Refill, Normal Inspection.  absent

: Joint Swelling, Pedal Edema





- Back Exam


Back Exam: NORMAL INSPECTION





- Neurological Exam


Neurological Exam: Alert, Awake, CN II-XII Intact, Normal Gait, Oriented x3





- Psychiatric Exam


Psychiatric exam: Normal Affect, Normal Mood





- Skin


Skin Exam: Dry, Intact, Normal Color, Warm





Assessment and Plan


(1) DVT prophylaxis


Status: Acute





(2) CAD (coronary artery disease)


Status: Acute





(3) Smoking


Status: Chronic





(4) Low back pain


Status: Acute





(5) Agitation


Status: Acute





(6) Scrotal edema


Status: Chronic








- Assessment and Plan (Free Text)


Assessment: 


(1) DVT prophylaxis


Assessment & Plan: 


scd and aehose, ambulation


Status: Acute





(2) CAD (coronary artery disease)


Assessment & Plan: 


s/p cardiac arrest


cont meds


Status: Acute





(3) Smoking


Assessment & Plan: 


nicoderm


Status: Chronic





(4) Low back pain


Assessment & Plan: 


ibuprofen prn


Status: Acute





(5) Agitation


Assessment & Plan: 


r/t neurocognitive d/o


seroquel/depakote


ativan/haldol prn


Status: Acute

## 2016-05-24 RX ADMIN — DIVALPROEX SODIUM SCH MG: 500 TABLET, DELAYED RELEASE ORAL at 08:45

## 2016-05-24 RX ADMIN — DIVALPROEX SODIUM SCH MG: 500 TABLET, DELAYED RELEASE ORAL at 16:25

## 2016-05-24 NOTE — CP.PCM.PN
Subjective





- Date & Time of Evaluation


Date of Evaluation: 05/24/16


Time of Evaluation: 07:25





- Subjective


Subjective: 


pt calm and cooperative. still unable to care fo rself. court date 5/26/16


pt w/o pain, f/c, n/v/d


ros negative 





Objective





- Vital Signs/Intake and Output


Vital Signs (last 24 hours): 


 











Temp Pulse Resp BP Pulse Ox


 


 97.5 F L  87   20   164/78 H  100 


 


 05/23/16 16:34  05/23/16 21:15  05/23/16 16:34  05/23/16 21:15  05/23/16 16:34











- Medications


Medications: 


 Current Medications





Aspirin (Ecotrin)  81 mg PO DAILY [facility]


   Last Admin: 05/23/16 08:33 Dose:  81 mg


Divalproex Sodium (Depakote Dr(*Bid*))  500 mg PO BID [facility]


   Last Admin: 05/23/16 16:04 Dose:  500 mg


Enalapril Maleate (Vasotec)  10 mg PO DAILY [facility]


   Last Admin: 05/23/16 08:36 Dose:  10 mg


Famotidine (Pepcid)  20 mg PO BID [facility]


   Last Admin: 05/23/16 16:03 Dose:  20 mg


Haloperidol Lactate (Haldol)  5 mg IM Q6 PRN


   PRN Reason: Agitation


   Last Admin: 05/23/16 13:23 Dose:  5 mg


Ibuprofen (Motrin Tab)  600 mg PO Q8 PRN


   PRN Reason: Pain, moderate (4-7)


   Last Admin: 04/02/16 09:07 Dose:  600 mg


Levetiracetam (Keppra)  500 mg PO BID [facility]


   Last Admin: 05/23/16 16:03 Dose:  500 mg


Lorazepam (Ativan)  2 mg IM Q6 PRN


   PRN Reason: Agitation


Metoprolol Tartrate (Lopressor)  50 mg PO Q12 [facility]


   Last Admin: 05/23/16 21:15 Dose:  50 mg


Nicotine (Nicoderm Cq)  1 patch TD DAILY [facility]


   Last Admin: 05/23/16 08:36 Dose:  1 patch


Quetiapine Fumarate (Seroquel)  25 mg PO HS [facility]


   Last Admin: 05/23/16 21:15 Dose:  25 mg











- Labs


Labs: 


 





 03/19/16 05:30 





 03/19/16 05:30 





 











PT  11.2 SECONDS (9.4-12.0)   12/14/15  05:20    


 


INR  1.05  (0.89-1.20)   12/14/15  05:20    


 


APTT  36.2 SECONDS (23.1-32.0)  H  12/14/15  05:20    














- Constitutional


Appears: Well, Non-toxic, No Acute Distress





- Head Exam


Head Exam: ATRAUMATIC, NORMAL INSPECTION, NORMOCEPHALIC





- Eye Exam


Eye Exam: EOMI, Normal appearance, PERRL


Pupil Exam: NORMAL ACCOMODATION, PERRL





- ENT Exam


ENT Exam: Mucous Membranes Moist, Normal Exam





- Neck Exam


Neck Exam: Full ROM, Normal Inspection.  absent: Lymphadenopathy





- Respiratory Exam


Respiratory Exam: Clear to Ausculation Bilateral, NORMAL BREATHING PATTERN





- Cardiovascular Exam


Cardiovascular Exam: REGULAR RHYTHM, +S1, +S2.  absent: Murmur





- GI/Abdominal Exam


GI & Abdominal Exam: Soft, Normal Bowel Sounds.  absent: Tenderness





- Rectal Exam


Rectal Exam: NORMAL INSPECTION





- Extremities Exam


Extremities Exam: absent: Joint Swelling, Pedal Edema





- Neurological Exam


Neurological Exam: Alert, Awake, CN II-XII Intact, Normal Gait





- Psychiatric Exam


Psychiatric exam: Normal Affect, Normal Mood





- Skin


Skin Exam: Dry, Intact, Normal Color, Warm





Assessment and Plan


(1) DVT prophylaxis


Assessment & Plan: 


scd and ae hose


ambulation


Status: Acute





(2) CAD (coronary artery disease)


Assessment & Plan: 


cont all meds


Status: Acute





(3) Smoking


Assessment & Plan: 


nicoderm


Status: Chronic





(4) Low back pain


Assessment & Plan: 


ibuprofen prn


Status: Chronic





(5) Agitation


Assessment & Plan: 


seroquel/depakote


ativan/haldol prn


Status: Acute





(6) Scrotal edema


Status: Chronic

## 2016-05-25 RX ADMIN — DIVALPROEX SODIUM SCH MG: 500 TABLET, DELAYED RELEASE ORAL at 18:33

## 2016-05-25 RX ADMIN — DIVALPROEX SODIUM SCH MG: 500 TABLET, DELAYED RELEASE ORAL at 10:13

## 2016-05-25 NOTE — CP.PCM.PN
Subjective





- Date & Time of Evaluation


Date of Evaluation: 05/25/16


Time of Evaluation: 07:42





- Subjective


Subjective: 


pt remains unable to care for self, remains calm adn coopertive. no f/c, n/v/d. 

no c/o pain.


ros negative


court date tomorrow 5/26/16





Objective





- Vital Signs/Intake and Output


Vital Signs (last 24 hours): 


 











Temp Pulse Resp BP Pulse Ox


 


 97.8 F   67   20   121/77   100 


 


 05/25/16 07:26  05/25/16 07:26  05/25/16 07:26  05/25/16 07:26  05/25/16 07:26











- Medications


Medications: 


 Current Medications





Aspirin (Ecotrin)  81 mg PO DAILY [facility]


   Last Admin: 05/24/16 08:43 Dose:  81 mg


Divalproex Sodium (Depakote Dr(*Bid*))  500 mg PO BID [facility]


   Last Admin: 05/24/16 16:25 Dose:  500 mg


Enalapril Maleate (Vasotec)  10 mg PO DAILY [facility]


   Last Admin: 05/24/16 08:44 Dose:  10 mg


Famotidine (Pepcid)  20 mg PO BID [facility]


   Last Admin: 05/24/16 16:24 Dose:  20 mg


Haloperidol Lactate (Haldol)  5 mg IM Q6 PRN


   PRN Reason: Agitation


   Last Admin: 05/23/16 13:23 Dose:  5 mg


Ibuprofen (Motrin Tab)  600 mg PO Q8 PRN


   PRN Reason: Pain, moderate (4-7)


   Last Admin: 04/02/16 09:07 Dose:  600 mg


Levetiracetam (Keppra)  500 mg PO BID [facility]


   Last Admin: 05/24/16 16:25 Dose:  500 mg


Lorazepam (Ativan)  2 mg IM Q6 PRN


   PRN Reason: Agitation


Metoprolol Tartrate (Lopressor)  50 mg PO Q12 [facility]


   Last Admin: 05/24/16 22:04 Dose:  50 mg


Nicotine (Nicoderm Cq)  1 patch TD DAILY [facility]


   Last Admin: 05/24/16 08:44 Dose:  1 patch


Quetiapine Fumarate (Seroquel)  25 mg PO HS [facility]


   Last Admin: 05/24/16 22:04 Dose:  25 mg











- Labs


Labs: 


 





 03/19/16 05:30 





 03/19/16 05:30 





 











PT  11.2 SECONDS (9.4-12.0)   12/14/15  05:20    


 


INR  1.05  (0.89-1.20)   12/14/15  05:20    


 


APTT  36.2 SECONDS (23.1-32.0)  H  12/14/15  05:20    














- Constitutional


Appears: Well, Non-toxic, No Acute Distress





- Head Exam


Head Exam: ATRAUMATIC, NORMAL INSPECTION, NORMOCEPHALIC





- Eye Exam


Eye Exam: EOMI, Normal appearance, PERRL


Pupil Exam: NORMAL ACCOMODATION, PERRL





- ENT Exam


ENT Exam: Mucous Membranes Moist, Normal Exam





- Neck Exam


Neck Exam: Full ROM, Normal Inspection.  absent: Lymphadenopathy





- Respiratory Exam


Respiratory Exam: Clear to Ausculation Bilateral, NORMAL BREATHING PATTERN





- Cardiovascular Exam


Cardiovascular Exam: REGULAR RHYTHM, +S1, +S2.  absent: Murmur





- GI/Abdominal Exam


GI & Abdominal Exam: Soft, Normal Bowel Sounds.  absent: Tenderness





- Extremities Exam


Extremities Exam: Full ROM, Normal Capillary Refill, Normal Inspection.  absent

: Joint Swelling, Pedal Edema





- Back Exam


Back Exam: NORMAL INSPECTION





- Neurological Exam


Neurological Exam: Alert, Awake, CN II-XII Intact, Normal Gait





- Psychiatric Exam


Psychiatric exam: Normal Affect, Normal Mood





- Skin


Skin Exam: Dry, Intact, Normal Color, Warm





Assessment and Plan


(1) DVT prophylaxis


Status: Acute





(2) CAD (coronary artery disease)


Status: Acute





(3) Smoking


Status: Chronic





(4) Low back pain


Status: Chronic





(5) Agitation


Status: Acute





(6) Scrotal edema


Status: Chronic








- Assessment and Plan (Free Text)


Assessment: 


(1) DVT prophylaxis


Assessment & Plan: 


scd and ae hose


ambulation


Status: Acute





(2) CAD (coronary artery disease)


Assessment & Plan: 


cont all meds


Status: Acute





(3) Smoking


Assessment & Plan: 


nicoderm


Status: Chronic





(4) Low back pain


Assessment & Plan: 


ibuprofen prn


Status: Chronic





(5) Agitation


Assessment & Plan: 


seroquel/depakote


ativan/haldol prn


Status: Acute

## 2016-05-26 RX ADMIN — DIVALPROEX SODIUM SCH MG: 500 TABLET, DELAYED RELEASE ORAL at 18:26

## 2016-05-26 RX ADMIN — DIVALPROEX SODIUM SCH MG: 500 TABLET, DELAYED RELEASE ORAL at 10:16

## 2016-05-26 NOTE — CP.PCM.PN
Subjective





- Date & Time of Evaluation


Date of Evaluation: 05/26/16


Time of Evaluation: 07:37





- Subjective


Subjective: 


pt comfortable in bed, no distress, no compalints. no f/c, n/v/d.  


pts court date for competency is today. pt remains unable to care for self


ros negative except as mentioned





Objective





- Vital Signs/Intake and Output


Vital Signs (last 24 hours): 


 











Temp Pulse Resp BP Pulse Ox


 


 97.8 F   80   20   153/93 H  100 


 


 05/25/16 07:26  05/25/16 22:32  05/25/16 07:26  05/25/16 22:32  05/25/16 07:26











- Medications


Medications: 


 Current Medications





Aspirin (Ecotrin)  81 mg PO DAILY [facility]


   Last Admin: 05/25/16 10:13 Dose:  81 mg


Divalproex Sodium (Depakote Dr(*Bid*))  500 mg PO BID [facility]


   Last Admin: 05/25/16 18:33 Dose:  500 mg


Enalapril Maleate (Vasotec)  10 mg PO DAILY [facility]


   Last Admin: 05/25/16 10:14 Dose:  10 mg


Famotidine (Pepcid)  20 mg PO BID [facility]


   Last Admin: 05/25/16 18:33 Dose:  20 mg


Haloperidol Lactate (Haldol)  5 mg IM Q6 PRN


   PRN Reason: Agitation


   Last Admin: 05/23/16 13:23 Dose:  5 mg


Ibuprofen (Motrin Tab)  600 mg PO Q8 PRN


   PRN Reason: Pain, moderate (4-7)


   Last Admin: 05/25/16 22:32 Dose:  600 mg


Levetiracetam (Keppra)  500 mg PO BID [facility]


   Last Admin: 05/25/16 18:33 Dose:  500 mg


Lorazepam (Ativan)  2 mg IM Q6 PRN


   PRN Reason: Agitation


Metoprolol Tartrate (Lopressor)  50 mg PO Q12 [facility]


   Last Admin: 05/25/16 22:32 Dose:  50 mg


Nicotine (Nicoderm Cq)  1 patch TD DAILY [facility]


   Last Admin: 05/25/16 10:14 Dose:  1 patch


Quetiapine Fumarate (Seroquel)  25 mg PO HS [facility]


   Last Admin: 05/25/16 22:32 Dose:  25 mg











- Labs


Labs: 


 





 03/19/16 05:30 





 03/19/16 05:30 





 











PT  11.2 SECONDS (9.4-12.0)   12/14/15  05:20    


 


INR  1.05  (0.89-1.20)   12/14/15  05:20    


 


APTT  36.2 SECONDS (23.1-32.0)  H  12/14/15  05:20    














- Constitutional


Appears: Well, Non-toxic, No Acute Distress





- Head Exam


Head Exam: ATRAUMATIC, NORMAL INSPECTION, NORMOCEPHALIC





- Eye Exam


Eye Exam: EOMI, Normal appearance, PERRL


Pupil Exam: NORMAL ACCOMODATION, PERRL





- ENT Exam


ENT Exam: Mucous Membranes Moist, Normal Exam





- Neck Exam


Neck Exam: Full ROM, Normal Inspection.  absent: Lymphadenopathy





- Respiratory Exam


Respiratory Exam: Clear to Ausculation Bilateral, NORMAL BREATHING PATTERN





- Cardiovascular Exam


Cardiovascular Exam: REGULAR RHYTHM, +S1, +S2.  absent: Murmur





- GI/Abdominal Exam


GI & Abdominal Exam: Soft, Normal Bowel Sounds.  absent: Tenderness





-  Exam


Bimanual exam: NORMAL BIMANUAL EXAM





- Extremities Exam


Extremities Exam: Full ROM, Normal Capillary Refill, Normal Inspection.  absent

: Joint Swelling, Pedal Edema





- Back Exam


Back Exam: NORMAL INSPECTION





- Neurological Exam


Neurological Exam: Alert, Awake, CN II-XII Intact, Normal Gait, Oriented x3





- Psychiatric Exam


Psychiatric exam: Normal Affect, Normal Mood





- Skin


Skin Exam: Dry, Intact, Normal Color, Warm





Assessment and Plan


(1) DVT prophylaxis


Assessment & Plan: 


scd and ae hose


ambulation


Status: Acute





(2) CAD (coronary artery disease)


Assessment & Plan: 


s/p cardiac arrest


cont meds


Status: Acute





(3) Smoking


Assessment & Plan: 


nicoderm


Status: Chronic





(4) Low back pain


Assessment & Plan: 


ibuprofen prn


Status: Chronic





(5) Agitation


Assessment & Plan: 


r/t neurocognitive d/o


seroquel/depakote


ativan/haldol prn


Status: Acute





(6) Scrotal edema


Status: Chronic

## 2016-05-27 RX ADMIN — DIVALPROEX SODIUM SCH MG: 500 TABLET, DELAYED RELEASE ORAL at 17:35

## 2016-05-27 RX ADMIN — DIVALPROEX SODIUM SCH MG: 500 TABLET, DELAYED RELEASE ORAL at 08:39

## 2016-05-27 NOTE — CP.PCM.PN
Subjective





- Date & Time of Evaluation


Date of Evaluation: 05/27/16


Time of Evaluation: 12:59





- Subjective


Subjective: 


pt comfortable in bed, no complaints. no f/c, n/v/d.


pts court date postponed to 6/30/16 for lack of guardian


ros negative


no agitation





Objective





- Vital Signs/Intake and Output


Vital Signs (last 24 hours): 


 











Temp Pulse Resp BP Pulse Ox


 


 98.1 F   62   20   113/73   99 


 


 05/27/16 08:10  05/27/16 08:40  05/27/16 08:10  05/27/16 08:40  05/27/16 08:10











- Medications


Medications: 


 Current Medications





Aspirin (Ecotrin)  81 mg PO DAILY [facility]


   Last Admin: 05/27/16 08:40 Dose:  81 mg


Divalproex Sodium (Depakote Dr(*Bid*))  500 mg PO BID [facility]


   Last Admin: 05/27/16 08:39 Dose:  500 mg


Enalapril Maleate (Vasotec)  10 mg PO DAILY [facility]


   Last Admin: 05/27/16 08:40 Dose:  10 mg


Famotidine (Pepcid)  20 mg PO BID [facility]


   Last Admin: 05/27/16 08:39 Dose:  20 mg


Haloperidol Lactate (Haldol)  5 mg IM Q6 PRN


   PRN Reason: Agitation


   Last Admin: 05/23/16 13:23 Dose:  5 mg


Ibuprofen (Motrin Tab)  600 mg PO Q8 PRN


   PRN Reason: Pain, moderate (4-7)


   Last Admin: 05/26/16 20:18 Dose:  600 mg


Levetiracetam (Keppra)  500 mg PO BID [facility]


   Last Admin: 05/27/16 08:40 Dose:  500 mg


Lorazepam (Ativan)  2 mg IM Q6 PRN


   PRN Reason: Agitation


Metoprolol Tartrate (Lopressor)  50 mg PO Q12 [facility]


   Last Admin: 05/27/16 08:40 Dose:  50 mg


Nicotine (Nicoderm Cq)  1 patch TD DAILY [facility]


   Last Admin: 05/27/16 08:39 Dose:  1 patch


Quetiapine Fumarate (Seroquel)  25 mg PO HS [facility]


   Last Admin: 05/26/16 21:30 Dose:  25 mg











- Labs


Labs: 


 





 03/19/16 05:30 





 03/19/16 05:30 





 











PT  11.2 SECONDS (9.4-12.0)   12/14/15  05:20    


 


INR  1.05  (0.89-1.20)   12/14/15  05:20    


 


APTT  36.2 SECONDS (23.1-32.0)  H  12/14/15  05:20    














- Constitutional


Appears: Well, Non-toxic, No Acute Distress





- Head Exam


Head Exam: ATRAUMATIC, NORMAL INSPECTION, NORMOCEPHALIC





- Eye Exam


Eye Exam: EOMI, Normal appearance, PERRL


Pupil Exam: NORMAL ACCOMODATION, PERRL





- ENT Exam


ENT Exam: Mucous Membranes Moist, Normal Exam





- Neck Exam


Neck Exam: Full ROM, Normal Inspection.  absent: Lymphadenopathy





- Respiratory Exam


Respiratory Exam: Clear to Ausculation Bilateral, NORMAL BREATHING PATTERN





- Cardiovascular Exam


Cardiovascular Exam: REGULAR RHYTHM, +S1, +S2.  absent: Murmur





- GI/Abdominal Exam


GI & Abdominal Exam: Soft, Normal Bowel Sounds.  absent: Tenderness





- Extremities Exam


Extremities Exam: Full ROM, Normal Capillary Refill, Normal Inspection.  absent

: Joint Swelling, Pedal Edema





- Back Exam


Back Exam: NORMAL INSPECTION





- Neurological Exam


Neurological Exam: Alert, Awake, CN II-XII Intact, Normal Gait, Oriented x3





- Psychiatric Exam


Psychiatric exam: Normal Affect, Normal Mood





- Skin


Skin Exam: Dry, Intact, Normal Color, Warm





Assessment and Plan


(1) DVT prophylaxis


Status: Acute





(2) CAD (coronary artery disease)


Status: Acute





(3) Smoking


Status: Chronic





(4) Low back pain


Status: Chronic





(5) Agitation


Status: Acute





(6) Scrotal edema


Status: Chronic








- Assessment and Plan (Free Text)


Assessment: 


(1) DVT prophylaxis


Assessment & Plan: 


scd and ae hose


ambulation


Status: Acute





(2) CAD (coronary artery disease)


Assessment & Plan: 


s/p cardiac arrest


cont meds


Status: Acute





(3) Smoking


Assessment & Plan: 


nicoderm


Status: Chronic





(4) Low back pain


Assessment & Plan: 


ibuprofen prn


Status: Chronic





(5) Agitation


Assessment & Plan: 


r/t neurocognitive d/o


seroquel/depakote


ativan/haldol prn


Status: Acute

## 2016-05-28 RX ADMIN — DIVALPROEX SODIUM SCH MG: 500 TABLET, DELAYED RELEASE ORAL at 09:19

## 2016-05-28 RX ADMIN — DIVALPROEX SODIUM SCH MG: 500 TABLET, DELAYED RELEASE ORAL at 17:42

## 2016-05-28 NOTE — CP.PCM.PN
Subjective





- Date & Time of Evaluation


Date of Evaluation: 05/28/16


Time of Evaluation: 12:13





- Subjective


Subjective: 


pt in no distres,s nocomplaints. no f/c, n/v/d. 


calm and cooperative


rosnegative





Objective





- Vital Signs/Intake and Output


Vital Signs (last 24 hours): 


 











Temp Pulse Resp BP Pulse Ox


 


 97.3 F L  75   20   128/77   100 


 


 05/28/16 07:47  05/28/16 09:18  05/28/16 07:47  05/28/16 09:18  05/28/16 07:47











- Medications


Medications: 


 Current Medications





Aspirin (Ecotrin)  81 mg PO DAILY [facility]


   Last Admin: 05/28/16 09:19 Dose:  81 mg


Divalproex Sodium (Depakote Dr(*Bid*))  500 mg PO BID [facility]


   Last Admin: 05/28/16 09:19 Dose:  500 mg


Enalapril Maleate (Vasotec)  10 mg PO DAILY [facility]


   Last Admin: 05/28/16 09:18 Dose:  10 mg


Famotidine (Pepcid)  20 mg PO BID [facility]


   Last Admin: 05/28/16 09:18 Dose:  20 mg


Haloperidol Lactate (Haldol)  5 mg IM Q6 PRN


   PRN Reason: Agitation


   Last Admin: 05/23/16 13:23 Dose:  5 mg


Ibuprofen (Motrin Tab)  600 mg PO Q8 PRN


   PRN Reason: Pain, moderate (4-7)


   Last Admin: 05/26/16 20:18 Dose:  600 mg


Levetiracetam (Keppra)  500 mg PO BID [facility]


   Last Admin: 05/28/16 09:18 Dose:  500 mg


Lorazepam (Ativan)  2 mg IM Q6 PRN


   PRN Reason: Agitation


Metoprolol Tartrate (Lopressor)  50 mg PO Q12 [facility]


   Last Admin: 05/28/16 09:18 Dose:  50 mg


Nicotine (Nicoderm Cq)  1 patch TD DAILY [facility]


   Last Admin: 05/28/16 09:19 Dose:  1 patch


Quetiapine Fumarate (Seroquel)  25 mg PO HS [facility]


   Last Admin: 05/27/16 21:14 Dose:  25 mg











- Labs


Labs: 


 





 03/19/16 05:30 





 03/19/16 05:30 





 











PT  11.2 SECONDS (9.4-12.0)   12/14/15  05:20    


 


INR  1.05  (0.89-1.20)   12/14/15  05:20    


 


APTT  36.2 SECONDS (23.1-32.0)  H  12/14/15  05:20    














- Constitutional


Appears: Well, Non-toxic, No Acute Distress





- Head Exam


Head Exam: ATRAUMATIC, NORMAL INSPECTION, NORMOCEPHALIC





- Eye Exam


Eye Exam: EOMI, Normal appearance, PERRL


Pupil Exam: NORMAL ACCOMODATION, PERRL





- ENT Exam


ENT Exam: Mucous Membranes Moist, Normal Exam





- Neck Exam


Neck Exam: Full ROM, Normal Inspection.  absent: Lymphadenopathy





- Respiratory Exam


Respiratory Exam: Clear to Ausculation Bilateral, NORMAL BREATHING PATTERN





- Cardiovascular Exam


Cardiovascular Exam: REGULAR RHYTHM, +S1, +S2.  absent: Murmur





- GI/Abdominal Exam


GI & Abdominal Exam: Soft, Normal Bowel Sounds.  absent: Tenderness





- Extremities Exam


Extremities Exam: Full ROM, Normal Capillary Refill, Normal Inspection.  absent

: Joint Swelling, Pedal Edema





- Back Exam


Back Exam: NORMAL INSPECTION





- Neurological Exam


Neurological Exam: Alert, Awake, CN II-XII Intact, Normal Gait, Oriented x3





- Psychiatric Exam


Psychiatric exam: Normal Affect, Normal Mood





- Skin


Skin Exam: Dry, Intact, Normal Color, Warm





Assessment and Plan


(1) DVT prophylaxis


Assessment & Plan: 


scd and ae hose


ambulation


Status: Acute





(2) CAD (coronary artery disease)


Assessment & Plan: 


cont meds


Status: Acute





(3) Smoking


Assessment & Plan: 


nicoderm


Status: Chronic





(4) Low back pain


Assessment & Plan: 


ibuprofen prn


Status: Chronic





(5) Agitation


Assessment & Plan: 


seroquel/depakote


ativanahldol prn


Status: Acute





(6) Scrotal edema


Status: Chronic

## 2016-05-29 RX ADMIN — DIVALPROEX SODIUM SCH MG: 250 TABLET, DELAYED RELEASE ORAL at 17:53

## 2016-05-29 RX ADMIN — DIVALPROEX SODIUM SCH MG: 500 TABLET, DELAYED RELEASE ORAL at 09:28

## 2016-05-29 NOTE — CP.PCM.PN
Subjective





- Date & Time of Evaluation


Date of Evaluation: 05/29/16


Time of Evaluation: 12:43





- Subjective


Subjective: 


pt comfortable, cooperative. no f/c, n/v/d. no pain. 


pts court date 6/30/16


pt still requires guardian for care


ros negative





Objective





- Vital Signs/Intake and Output


Vital Signs (last 24 hours): 


 











Temp Pulse Resp BP Pulse Ox


 


 97.3 F L  93 H  18   117/82   100 


 


 05/29/16 07:52  05/29/16 09:28  05/29/16 07:52  05/29/16 09:28  05/29/16 07:52











- Medications


Medications: 


 Current Medications





Aspirin (Ecotrin)  81 mg PO DAILY [facility]


   Last Admin: 05/29/16 09:28 Dose:  81 mg


Divalproex Sodium (Depakote Dr(*Bid*))  500 mg PO BID [facility]


   Last Admin: 05/29/16 09:28 Dose:  500 mg


Enalapril Maleate (Vasotec)  10 mg PO DAILY [facility]


   Last Admin: 05/29/16 09:30 Dose:  10 mg


Famotidine (Pepcid)  20 mg PO BID [facility]


   Last Admin: 05/29/16 09:29 Dose:  20 mg


Haloperidol Lactate (Haldol)  5 mg IM Q6 PRN


   PRN Reason: Agitation


   Last Admin: 05/23/16 13:23 Dose:  5 mg


Ibuprofen (Motrin Tab)  600 mg PO Q8 PRN


   PRN Reason: Pain, moderate (4-7)


   Last Admin: 05/26/16 20:18 Dose:  600 mg


Levetiracetam (Keppra)  500 mg PO BID [facility]


   Last Admin: 05/29/16 09:28 Dose:  500 mg


Lorazepam (Ativan)  2 mg IM Q6 PRN


   PRN Reason: Agitation


Metoprolol Tartrate (Lopressor)  50 mg PO Q12 [facility]


   Last Admin: 05/29/16 09:28 Dose:  50 mg


Nicotine (Nicoderm Cq)  1 patch TD DAILY [facility]


   Last Admin: 05/29/16 09:29 Dose:  1 patch


Quetiapine Fumarate (Seroquel)  25 mg PO HS [facility]


   Last Admin: 05/28/16 21:41 Dose:  25 mg











- Labs


Labs: 


 





 03/19/16 05:30 





 03/19/16 05:30 





 











PT  11.2 SECONDS (9.4-12.0)   12/14/15  05:20    


 


INR  1.05  (0.89-1.20)   12/14/15  05:20    


 


APTT  36.2 SECONDS (23.1-32.0)  H  12/14/15  05:20    














- Constitutional


Appears: Well, Non-toxic, No Acute Distress





- Head Exam


Head Exam: ATRAUMATIC, NORMAL INSPECTION, NORMOCEPHALIC





- Eye Exam


Eye Exam: EOMI, Normal appearance, PERRL


Pupil Exam: NORMAL ACCOMODATION, PERRL





- ENT Exam


ENT Exam: Mucous Membranes Moist, Normal Exam





- Neck Exam


Neck Exam: Full ROM, Normal Inspection.  absent: Lymphadenopathy





- Respiratory Exam


Respiratory Exam: Clear to Ausculation Bilateral, NORMAL BREATHING PATTERN





- Cardiovascular Exam


Cardiovascular Exam: REGULAR RHYTHM, +S1, +S2.  absent: Murmur





- GI/Abdominal Exam


GI & Abdominal Exam: Soft, Normal Bowel Sounds.  absent: Tenderness





- Extremities Exam


Extremities Exam: Full ROM, Normal Capillary Refill, Normal Inspection.  absent

: Joint Swelling, Pedal Edema





- Back Exam


Back Exam: NORMAL INSPECTION





- Neurological Exam


Neurological Exam: Alert, Awake, CN II-XII Intact, Normal Gait, Oriented x3





- Psychiatric Exam


Psychiatric exam: Normal Affect, Normal Mood





- Skin


Skin Exam: Dry, Intact, Normal Color, Warm





Assessment and Plan


(1) DVT prophylaxis


Assessment & Plan: 


scd and ae hose


ambulation


Status: Acute





(2) CAD (coronary artery disease)


Assessment & Plan: 


cont meds


s/p cardiac arrest


Status: Acute





(3) Smoking


Assessment & Plan: 


nicoderm


Status: Chronic





(4) Low back pain


Assessment & Plan: 


ibuprofen prn


Status: Chronic





(5) Agitation


Assessment & Plan: 


r/t neurocognitive d/o


seroquel/depakote


ativan/haldol prn


Status: Acute





(6) Scrotal edema


Status: Chronic

## 2016-05-30 RX ADMIN — DIVALPROEX SODIUM SCH MG: 250 TABLET, DELAYED RELEASE ORAL at 09:49

## 2016-05-30 RX ADMIN — DIVALPROEX SODIUM SCH MG: 250 TABLET, DELAYED RELEASE ORAL at 18:00

## 2016-05-30 NOTE — CP.PCM.PN
Subjective





- Date & Time of Evaluation


Date of Evaluation: 05/30/16


Time of Evaluation: 16:33





- Subjective


Subjective: 


pt doing well, sittin up in bed w/o complaints or distress


no f/c, n/v/d


calm and cooperative


ros negative


pending court date 6/30/16





Objective





- Vital Signs/Intake and Output


Vital Signs (last 24 hours): 


 











Temp Pulse Resp BP Pulse Ox


 


 97.9 F   79   19   112/77   100 


 


 05/30/16 16:08  05/30/16 16:08  05/30/16 16:08  05/30/16 16:08  05/30/16 16:08











- Medications


Medications: 


 Current Medications





Aspirin (Ecotrin)  [facility]ILY [facility]


   Last Admin: 05/30/16 09:48 Dose:  81 mg


Divalproex Sodium (Depakote Dr(*Bid*))  500 mg PO BID [facility]


   Last Admin: 05/30/16 09:49 Dose:  500 mg


Enalapril Maleate (Vasotec)  10 mg PO DAILY [facility]


   Last Admin: 05/30/16 09:51 Dose:  10 mg


Famotidine (Pepcid)  20 mg PO BID [facility]


   Last Admin: 05/30/16 09:49 Dose:  20 mg


Haloperidol Lactate (Haldol)  5 mg IM Q6 PRN


   PRN Reason: Agitation


   Last Admin: 05/23/16 13:23 Dose:  5 mg


Ibuprofen (Motrin Tab)  600 mg PO Q8 PRN


   PRN Reason: Pain, moderate (4-7)


   Last Admin: 05/26/16 20:18 Dose:  600 mg


Levetiracetam (Keppra)  500 mg PO BID [facility]


   Last Admin: 05/30/16 09:49 Dose:  500 mg


Lorazepam (Ativan)  2 mg IM Q6 PRN


   PRN Reason: Agitation


Metoprolol Tartrate (Lopressor)  50 mg PO Q12 [facility]


   Last Admin: 05/30/16 09:50 Dose:  50 mg


Nicotine (Nicoderm Cq)  1 patch TD DAILY [facility]


   Last Admin: 05/30/16 09:51 Dose:  Not Given


Quetiapine Fumarate (Seroquel)  25 mg PO HS [facility]


   Last Admin: 05/29/16 21:05 Dose:  25 mg











- Labs


Labs: 


 





 03/19/16 05:30 





 03/19/16 05:30 





 











PT  11.2 SECONDS (9.4-12.0)   12/14/15  05:20    


 


INR  1.05  (0.89-1.20)   12/14/15  05:20    


 


APTT  36.2 SECONDS (23.1-32.0)  H  12/14/15  05:20    














- Constitutional


Appears: Well, Non-toxic, No Acute Distress





- Head Exam


Head Exam: ATRAUMATIC, NORMAL INSPECTION, NORMOCEPHALIC





- Eye Exam


Eye Exam: EOMI, Normal appearance, PERRL


Pupil Exam: NORMAL ACCOMODATION, PERRL





- ENT Exam


ENT Exam: Mucous Membranes Moist, Normal Exam





- Neck Exam


Neck Exam: Full ROM, Normal Inspection.  absent: Lymphadenopathy





- Respiratory Exam


Respiratory Exam: Clear to Ausculation Bilateral, NORMAL BREATHING PATTERN





- Cardiovascular Exam


Cardiovascular Exam: REGULAR RHYTHM, +S1, +S2.  absent: Murmur





- GI/Abdominal Exam


GI & Abdominal Exam: Soft, Normal Bowel Sounds.  absent: Tenderness





- Extremities Exam


Extremities Exam: Calf Tenderness, Full ROM, Normal Capillary Refill.  absent: 

Joint Swelling, Pedal Edema





- Back Exam


Back Exam: NORMAL INSPECTION





- Neurological Exam


Neurological Exam: Alert, Awake, CN II-XII Intact, Normal Gait, Oriented x3





- Psychiatric Exam


Psychiatric exam: Normal Affect, Normal Mood





- Skin


Skin Exam: Dry, Intact, Normal Color, Warm





Assessment and Plan


(1) DVT prophylaxis


Assessment & Plan: 


scd and ae hose


ambulation


Status: Acute





(2) CAD (coronary artery disease)


Assessment & Plan: 


cont meds


s/p cardiac arrest


Status: Acute





(3) Smoking


Assessment & Plan: 


nicoderm


Status: Chronic





(4) Low back pain


Assessment & Plan: 


ibuprofen prn


Status: Chronic





(5) Agitation


Assessment & Plan: 


r/t neuro coginitive d/o


seroquel/depakote


ativan/haldol prn


Status: Acute





(6) Scrotal edema


Status: Chronic








- Assessment and Plan (Free Text)


Assessment: 


updated b/w ordered


keppra to be d/c as per dr howell

## 2016-05-31 LAB
ALBUMIN SERPL-MCNC: 3.9 G/DL (ref 3.5–5)
ALBUMIN/GLOB SERPL: 1 {RATIO} (ref 1–2.1)
ALT SERPL-CCNC: 17 U/L (ref 21–72)
AST SERPL-CCNC: 20 U/L (ref 17–59)
BASOPHILS # BLD AUTO: 0 K/UL (ref 0–0.2)
BASOPHILS NFR BLD: 0.4 % (ref 0–2)
BUN SERPL-MCNC: 22 MG/DL (ref 9–20)
CALCIUM SERPL-MCNC: 9.3 MG/DL (ref 8.4–10.2)
EOSINOPHIL # BLD AUTO: 0.6 K/UL (ref 0–0.7)
EOSINOPHIL NFR BLD: 8.8 % (ref 0–4)
ERYTHROCYTE [DISTWIDTH] IN BLOOD BY AUTOMATED COUNT: 12.6 % (ref 11.5–14.5)
GFR NON-AFRICAN AMERICAN: > 60
HDLC SERPL-MCNC: 30 MG/DL (ref 30–70)
HGB BLD-MCNC: 12.6 G/DL (ref 12–18)
LDLC SERPL-MCNC: 112 MG/DL (ref 0–129)
LYMPHOCYTES # BLD AUTO: 2.6 K/UL (ref 1–4.3)
LYMPHOCYTES NFR BLD AUTO: 37.2 % (ref 20–40)
MCH RBC QN AUTO: 29.6 PG (ref 27–31)
MCHC RBC AUTO-ENTMCNC: 33.1 G/DL (ref 33–37)
MCV RBC AUTO: 89.4 FL (ref 80–94)
MONOCYTES # BLD: 0.7 K/UL (ref 0–0.8)
MONOCYTES NFR BLD: 10.7 % (ref 0–10)
NEUTROPHILS # BLD: 3 K/UL (ref 1.8–7)
NEUTROPHILS NFR BLD AUTO: 42.9 % (ref 50–75)
NRBC BLD AUTO-RTO: 0 % (ref 0–0)
PLATELET # BLD: 199 K/UL (ref 130–400)
PMV BLD AUTO: 7.1 FL (ref 7.2–11.7)
RBC # BLD AUTO: 4.25 MIL/UL (ref 4.4–5.9)
VIT B12 SERPL-MCNC: 752 PG/ML (ref 239–931)
WBC # BLD AUTO: 6.9 K/UL (ref 4.8–10.8)

## 2016-05-31 RX ADMIN — DIVALPROEX SODIUM SCH MG: 500 TABLET, DELAYED RELEASE ORAL at 17:16

## 2016-05-31 RX ADMIN — DIVALPROEX SODIUM SCH MG: 250 TABLET, DELAYED RELEASE ORAL at 09:19

## 2016-05-31 NOTE — CP.PCM.PN
Subjective





- Date & Time of Evaluation


Date of Evaluation: 05/31/16


Time of Evaluation: 07:32





- Subjective


Subjective: 


pt comfortabl e inbed, no distres/complaints. calm and cooperative. no fc, n/v/d


bw reviewed-a1c noted


ros negative





Objective





- Vital Signs/Intake and Output


Vital Signs (last 24 hours): 


 











Temp Pulse Resp BP Pulse Ox


 


 97.9 F   63   19   128/73   100 


 


 05/30/16 16:08  05/30/16 21:34  05/30/16 16:08  05/30/16 21:34  05/30/16 16:08











- Medications


Medications: 


 Current Medications





Aspirin (Ecotrin)  81 mg PO DAILY [facility]


   Last Admin: 05/30/16 09:48 Dose:  81 mg


Divalproex Sodium (Depakote Dr(*Bid*))  500 mg PO BID [facility]


   Last Admin: 05/30/16 18:00 Dose:  500 mg


Enalapril Maleate (Vasotec)  10 mg PO DAILY [facility]


   Last Admin: 05/30/16 09:51 Dose:  10 mg


Famotidine (Pepcid)  20 mg PO BID [facility]


   Last Admin: 05/30/16 18:00 Dose:  20 mg


Haloperidol Lactate (Haldol)  5 mg IM Q6 PRN


   PRN Reason: Agitation


   Last Admin: 05/23/16 13:23 Dose:  5 mg


Ibuprofen (Motrin Tab)  600 mg PO Q8 PRN


   PRN Reason: Pain, moderate (4-7)


   Last Admin: 05/26/16 20:18 Dose:  600 mg


Lorazepam (Ativan)  2 mg IM Q6 PRN


   PRN Reason: Agitation


Metoprolol Tartrate (Lopressor)  50 mg PO Q12 [facility]


   Last Admin: 05/30/16 21:34 Dose:  50 mg


Nicotine (Nicoderm Cq)  1 patch TD DAILY [facility]


   Last Admin: 05/30/16 09:51 Dose:  Not Given


Quetiapine Fumarate (Seroquel)  25 mg PO HS [facility]


   Last Admin: 05/30/16 21:33 Dose:  25 mg











- Labs


Labs: 


 





 05/31/16 06:15 





 05/31/16 06:15 





 











PT  11.2 SECONDS (9.4-12.0)   12/14/15  05:20    


 


INR  1.05  (0.89-1.20)   12/14/15  05:20    


 


APTT  36.2 SECONDS (23.1-32.0)  H  12/14/15  05:20    














- Constitutional


Appears: Well, Non-toxic, No Acute Distress





- Head Exam


Head Exam: ATRAUMATIC, NORMAL INSPECTION, NORMOCEPHALIC





- Eye Exam


Eye Exam: EOMI, Normal appearance, PERRL


Pupil Exam: NORMAL ACCOMODATION, PERRL





- ENT Exam


ENT Exam: Mucous Membranes Moist, Normal Exam





- Neck Exam


Neck Exam: Full ROM, Normal Inspection.  absent: Lymphadenopathy





- Respiratory Exam


Respiratory Exam: Clear to Ausculation Bilateral, NORMAL BREATHING PATTERN





- Cardiovascular Exam


Cardiovascular Exam: REGULAR RHYTHM, +S1, +S2.  absent: Murmur





- GI/Abdominal Exam


GI & Abdominal Exam: Soft, Normal Bowel Sounds.  absent: Tenderness





- Rectal Exam


Rectal Exam: NORMAL INSPECTION





- Extremities Exam


Extremities Exam: Full ROM, Normal Capillary Refill, Normal Inspection.  absent

: Joint Swelling, Pedal Edema





- Back Exam


Back Exam: NORMAL INSPECTION





- Neurological Exam


Neurological Exam: Alert, Awake, CN II-XII Intact, Normal Gait, Oriented x3





- Psychiatric Exam


Psychiatric exam: Normal Affect, Normal Mood





- Skin


Skin Exam: Dry, Intact, Normal Color, Warm





Assessment and Plan


(1) DVT prophylaxis


Assessment & Plan: 


scd and ae hose


ambulation


Status: Acute





(2) CAD (coronary artery disease)


Assessment & Plan: 


s/p cardiac arrest


cont meds


Status: Acute





(3) Smoking


Assessment & Plan: 


nicoderm


Status: Chronic





(4) Low back pain


Status: Chronic





(5) Agitation


Assessment & Plan: 


r/t neuro cognitive d/o


seroquel/depakote


ativan/haldol prn


Status: Acute





(6) Scrotal edema


Status: Chronic





(7) Prediabetes


Assessment & Plan: 


dietary control


Status: Acute

## 2016-06-01 RX ADMIN — DIVALPROEX SODIUM SCH MG: 500 TABLET, DELAYED RELEASE ORAL at 08:02

## 2016-06-01 RX ADMIN — DIVALPROEX SODIUM SCH MG: 500 TABLET, DELAYED RELEASE ORAL at 16:20

## 2016-06-01 NOTE — CP.PCM.PN
Subjective





- Date & Time of Evaluation


Date of Evaluation: 06/01/16


Time of Evaluation: 09:14





- Subjective


Subjective: 


pt calm and cooperative. pt remains unbale to care for self and is flight risk


no complaints offered. no f/c, /v/d. no pain


ros negative





Objective





- Vital Signs/Intake and Output


Vital Signs (last 24 hours): 


 











Temp Pulse Resp BP Pulse Ox


 


 97.2 F L  65   18   130/82   100 


 


 06/01/16 07:42  06/01/16 07:42  06/01/16 07:42  06/01/16 07:42  06/01/16 07:42











- Medications


Medications: 


 Current Medications





Aspirin (Ecotrin)  81 mg PO DAILY [facility]


   Last Admin: 06/01/16 08:03 Dose:  81 mg


Divalproex Sodium (Depakote Dr(*Bid*))  500 mg PO BID [facility]


   Last Admin: 06/01/16 08:02 Dose:  500 mg


Enalapril Maleate (Vasotec)  10 mg PO DAILY [facility]


   Last Admin: 06/01/16 08:03 Dose:  10 mg


Famotidine (Pepcid)  20 mg PO BID [facility]


   Last Admin: 06/01/16 08:02 Dose:  20 mg


Haloperidol Lactate (Haldol)  5 mg IM Q6 PRN


   PRN Reason: Agitation


   Last Admin: 05/23/16 13:23 Dose:  5 mg


Ibuprofen (Motrin Tab)  600 mg PO Q8 PRN


   PRN Reason: Pain, moderate (4-7)


   Last Admin: 05/26/16 20:18 Dose:  600 mg


Lorazepam (Ativan)  2 mg IM Q6 PRN


   PRN Reason: Agitation


Metoprolol Tartrate (Lopressor)  50 mg PO Q12 [facility]


   Last Admin: 06/01/16 08:03 Dose:  50 mg


Nicotine (Nicoderm Cq)  1 patch TD DAILY [facility]


   Last Admin: 06/01/16 08:03 Dose:  1 patch


Quetiapine Fumarate (Seroquel)  25 mg PO HS [facility]


   Last Admin: 05/31/16 21:36 Dose:  25 mg











- Labs


Labs: 


 





 05/31/16 06:15 





 05/31/16 06:15 





 











PT  11.2 SECONDS (9.4-12.0)   12/14/15  05:20    


 


INR  1.05  (0.89-1.20)   12/14/15  05:20    


 


APTT  36.2 SECONDS (23.1-32.0)  H  12/14/15  05:20    














- Constitutional


Appears: Well, Non-toxic, No Acute Distress





- Head Exam


Head Exam: ATRAUMATIC, NORMAL INSPECTION, NORMOCEPHALIC





- Eye Exam


Eye Exam: EOMI, Normal appearance, PERRL


Pupil Exam: NORMAL ACCOMODATION, PERRL





- ENT Exam


ENT Exam: Mucous Membranes Moist, Normal Exam





- Neck Exam


Neck Exam: Full ROM, Normal Inspection.  absent: Lymphadenopathy





- Respiratory Exam


Respiratory Exam: Clear to Ausculation Bilateral, NORMAL BREATHING PATTERN





- Cardiovascular Exam


Cardiovascular Exam: REGULAR RHYTHM, +S1, +S2.  absent: Murmur





- GI/Abdominal Exam


GI & Abdominal Exam: Soft, Normal Bowel Sounds.  absent: Tenderness





- Rectal Exam


Rectal Exam: NORMAL INSPECTION





- Extremities Exam


Extremities Exam: Full ROM, Normal Capillary Refill, Normal Inspection.  absent

: Joint Swelling, Pedal Edema





- Back Exam


Back Exam: NORMAL INSPECTION





- Neurological Exam


Neurological Exam: Alert, Awake, CN II-XII Intact, Normal Gait, Oriented x3





- Psychiatric Exam


Psychiatric exam: Normal Affect, Normal Mood





- Skin


Skin Exam: Dry, Intact, Normal Color, Warm





Assessment and Plan


(1) DVT prophylaxis


Assessment & Plan: 


scd and ae hose


ambulation


Status: Acute





(2) CAD (coronary artery disease)


Assessment & Plan: 


s/p cardiac arrest


cont meds


no interventions planned as per cardio


Status: Acute





(3) Smoking


Assessment & Plan: 


nicoderm


Status: Chronic





(4) Low back pain


Assessment & Plan: 


ibuprofen


Status: Chronic





(5) Agitation


Assessment & Plan: 


r/t neuro cognitive d/o


seroquel/depakote


ativan/haldol prn


Status: Acute





(6) Scrotal edema


Status: Chronic





(7) Prediabetes


Assessment & Plan: 


dietary control


Status: Acute

## 2016-06-02 RX ADMIN — DIVALPROEX SODIUM SCH MG: 500 TABLET, DELAYED RELEASE ORAL at 08:04

## 2016-06-02 NOTE — CP.PCM.PN
Subjective





- Date & Time of Evaluation


Date of Evaluation: 06/02/16


Time of Evaluation: 07:54





- Subjective


Subjective: 


pt comfortable in bed, no distress, no complaints. no f/c, n/v/d.


no pain


ros negative except as noted


pending guardianship hearing





Objective





- Vital Signs/Intake and Output


Vital Signs (last 24 hours): 


 











Temp Pulse Resp BP Pulse Ox


 


 97.9 F   72   20   137/84   98 


 


 06/01/16 16:28  06/01/16 21:38  06/01/16 16:28  06/01/16 21:38  06/01/16 16:28











- Medications


Medications: 


 Current Medications





Aspirin (Ecotrin)  81 mg PO DAILY [facility]


   Last Admin: 06/01/16 08:03 Dose:  81 mg


Divalproex Sodium (Depakote Dr(*Bid*))  500 mg PO BID [facility]


   Last Admin: 06/01/16 16:20 Dose:  500 mg


Enalapril Maleate (Vasotec)  10 mg PO DAILY [facility]


   Last Admin: 06/01/16 08:03 Dose:  10 mg


Famotidine (Pepcid)  20 mg PO BID [facility]


   Last Admin: 06/01/16 16:20 Dose:  20 mg


Haloperidol Lactate (Haldol)  5 mg IM Q6 PRN


   PRN Reason: Agitation


   Last Admin: 05/23/16 13:23 Dose:  5 mg


Ibuprofen (Motrin Tab)  600 mg PO Q8 PRN


   PRN Reason: Pain, moderate (4-7)


   Last Admin: 05/26/16 20:18 Dose:  600 mg


Lorazepam (Ativan)  2 mg IM Q6 PRN


   PRN Reason: Agitation


Metoprolol Tartrate (Lopressor)  50 mg PO Q12 [facility]


   Last Admin: 06/01/16 21:38 Dose:  50 mg


Nicotine (Nicoderm Cq)  1 patch TD DAILY [facility]


   Last Admin: 06/01/16 08:03 Dose:  1 patch


Quetiapine Fumarate (Seroquel)  25 mg PO HS [facility]


   Last Admin: 06/01/16 21:38 Dose:  25 mg











- Labs


Labs: 


 





 05/31/16 06:15 





 05/31/16 06:15 





 











PT  11.2 SECONDS (9.4-12.0)   12/14/15  05:20    


 


INR  1.05  (0.89-1.20)   12/14/15  05:20    


 


APTT  36.2 SECONDS (23.1-32.0)  H  12/14/15  05:20    














- Constitutional


Appears: Well, Non-toxic, No Acute Distress





- Head Exam


Head Exam: ATRAUMATIC, NORMAL INSPECTION, NORMOCEPHALIC





- Eye Exam


Eye Exam: EOMI, Normal appearance, PERRL


Pupil Exam: NORMAL ACCOMODATION, PERRL





- ENT Exam


ENT Exam: Mucous Membranes Moist, Normal Exam





- Neck Exam


Neck Exam: Full ROM, Normal Inspection.  absent: Lymphadenopathy





- Respiratory Exam


Respiratory Exam: Clear to Ausculation Bilateral, NORMAL BREATHING PATTERN





- Cardiovascular Exam


Cardiovascular Exam: REGULAR RHYTHM, +S1, +S2.  absent: Murmur





- GI/Abdominal Exam


GI & Abdominal Exam: Soft, Normal Bowel Sounds.  absent: Tenderness





- Extremities Exam


Extremities Exam: Full ROM, Normal Capillary Refill, Normal Inspection.  absent

: Joint Swelling, Pedal Edema





- Back Exam


Back Exam: NORMAL INSPECTION





- Neurological Exam


Neurological Exam: Alert, Awake, CN II-XII Intact, Normal Gait, Oriented x3





- Psychiatric Exam


Psychiatric exam: Normal Affect, Normal Mood





- Skin


Skin Exam: Dry, Intact, Normal Color, Warm





Assessment and Plan


(1) DVT prophylaxis


Assessment & Plan: 


scd nad ae hose


Status: Acute





(2) CAD (coronary artery disease)


Assessment & Plan: 


cont meds


Status: Acute





(3) Smoking


Assessment & Plan: 


nicoderm


Status: Chronic





(4) Low back pain


Assessment & Plan: 


ibuprofen prn


Status: Chronic





(5) Agitation


Assessment & Plan: 


seroquel/depakote


ativan/haldol prn


Status: Acute





(6) Scrotal edema


Status: Chronic





(7) Prediabetes


Assessment & Plan: 


dietary control


Status: Acute

## 2016-06-03 RX ADMIN — DIVALPROEX SODIUM SCH MG: 500 TABLET, DELAYED RELEASE ORAL at 09:34

## 2016-06-03 RX ADMIN — DIVALPROEX SODIUM SCH MG: 500 TABLET, DELAYED RELEASE ORAL at 17:06

## 2016-06-03 NOTE — CP.PCM.PN
Subjective





- Date & Time of Evaluation


Date of Evaluation: 06/03/16


Time of Evaluation: 16:11





- Subjective


Subjective: 


pt calm and cooperative, no f/c, n/v/d. resps even and unlabored. no pain/

distress


ros negative. 





Objective





- Vital Signs/Intake and Output


Vital Signs (last 24 hours): 


 











Temp Pulse Resp BP Pulse Ox


 


 97.3 F L  65   20   121/79   96 


 


 06/03/16 07:30  06/03/16 07:30  06/03/16 07:30  06/03/16 07:30  06/03/16 07:30











- Medications


Medications: 


 Current Medications





Aspirin (Ecotrin)  81 mg PO DAILY [facility]


   Last Admin: 06/03/16 09:35 Dose:  81 mg


Divalproex Sodium (Depakote Dr(*Bid*))  500 mg PO BID [facility]


   Last Admin: 06/03/16 09:34 Dose:  500 mg


Enalapril Maleate (Vasotec)  10 mg PO DAILY [facility]


   Last Admin: 06/03/16 09:35 Dose:  10 mg


Famotidine (Pepcid)  20 mg PO BID [facility]


   Last Admin: 06/03/16 09:35 Dose:  20 mg


Haloperidol Lactate (Haldol)  5 mg IM Q6 PRN


   PRN Reason: Agitation


   Last Admin: 05/23/16 13:23 Dose:  5 mg


Ibuprofen (Motrin Tab)  600 mg PO Q8 PRN


   PRN Reason: Pain, moderate (4-7)


   Last Admin: 05/26/16 20:18 Dose:  600 mg


Lorazepam (Ativan)  2 mg IM Q6 PRN


   PRN Reason: Agitation


Metoprolol Tartrate (Lopressor)  50 mg PO Q12 [facility]


   Last Admin: 06/03/16 09:35 Dose:  50 mg


Nicotine (Nicoderm Cq)  1 patch TD DAILY [facility]


   Last Admin: 06/03/16 09:35 Dose:  1 patch


Quetiapine Fumarate (Seroquel)  25 mg PO HS [facility]


   Last Admin: 06/02/16 21:22 Dose:  25 mg











- Labs


Labs: 


 





 05/31/16 06:15 





 05/31/16 06:15 





 











PT  11.2 SECONDS (9.4-12.0)   12/14/15  05:20    


 


INR  1.05  (0.89-1.20)   12/14/15  05:20    


 


APTT  36.2 SECONDS (23.1-32.0)  H  12/14/15  05:20    














- Constitutional


Appears: Well, Non-toxic, No Acute Distress





- Head Exam


Head Exam: ATRAUMATIC, NORMAL INSPECTION, NORMOCEPHALIC





- Eye Exam


Eye Exam: EOMI, Normal appearance, PERRL


Pupil Exam: NORMAL ACCOMODATION, PERRL





- ENT Exam


ENT Exam: Mucous Membranes Moist, Normal Exam





- Neck Exam


Neck Exam: Full ROM, Normal Inspection.  absent: Lymphadenopathy





- Respiratory Exam


Respiratory Exam: Clear to Ausculation Bilateral, NORMAL BREATHING PATTERN





- Cardiovascular Exam


Cardiovascular Exam: REGULAR RHYTHM, +S1, +S2.  absent: Murmur





- GI/Abdominal Exam


GI & Abdominal Exam: Soft, Normal Bowel Sounds.  absent: Tenderness





- Extremities Exam


Extremities Exam: Full ROM, Normal Capillary Refill, Normal Inspection.  absent

: Joint Swelling, Pedal Edema





- Back Exam


Back Exam: NORMAL INSPECTION





- Neurological Exam


Neurological Exam: Alert, Awake, CN II-XII Intact, Normal Gait, Oriented x3





- Psychiatric Exam


Psychiatric exam: Normal Affect, Normal Mood





- Skin


Skin Exam: Dry, Intact, Normal Color, Warm





Assessment and Plan


(1) DVT prophylaxis


Assessment & Plan: 


scd and aehose


ambulation


Status: Acute





(2) CAD (coronary artery disease)


Assessment & Plan: 


cont meds


Status: Acute





(3) Smoking


Assessment & Plan: 


nicoderm


Status: Chronic





(4) Low back pain


Assessment & Plan: 


ibuprofen prn


Status: Chronic





(5) Agitation


Assessment & Plan: 


seroquel/depakote


ativan/haldol prn


Status: Acute





(6) Scrotal edema


Status: Chronic





(7) Prediabetes


Status: Acute

## 2016-06-04 RX ADMIN — DIVALPROEX SODIUM SCH MG: 500 TABLET, DELAYED RELEASE ORAL at 08:14

## 2016-06-04 RX ADMIN — DIVALPROEX SODIUM SCH MG: 500 TABLET, DELAYED RELEASE ORAL at 16:29

## 2016-06-04 NOTE — CP.PCM.PN
Subjective





- Date & Time of Evaluation


Date of Evaluation: 06/04/16


Time of Evaluation: 07:24





- Subjective


Subjective: 


pt comfortable in bed, no complaints. no distress. nof /c, n/v/d. calm and 

cooperative


ros negative





Objective





- Vital Signs/Intake and Output


Vital Signs (last 24 hours): 


 











Temp Pulse Resp BP Pulse Ox


 


 97.6 F   68   18   130/85   98 


 


 06/04/16 00:58  06/04/16 00:58  06/04/16 00:58  06/04/16 00:58  06/04/16 00:58











- Medications


Medications: 


 Current Medications





Aspirin (Ecotrin)  81 mg PO DAILY [facility]


   Last Admin: 06/03/16 09:35 Dose:  81 mg


Divalproex Sodium (Depakote Dr(*Bid*))  500 mg PO BID [facility]


   Last Admin: 06/03/16 17:06 Dose:  500 mg


Enalapril Maleate (Vasotec)  10 mg PO DAILY [facility]


   Last Admin: 06/03/16 09:35 Dose:  10 mg


Famotidine (Pepcid)  20 mg PO BID [facility]


   Last Admin: 06/03/16 17:07 Dose:  20 mg


Haloperidol Lactate (Haldol)  5 mg IM Q6 PRN


   PRN Reason: Agitation


   Last Admin: 05/23/16 13:23 Dose:  5 mg


Ibuprofen (Motrin Tab)  600 mg PO Q8 PRN


   PRN Reason: Pain, moderate (4-7)


   Last Admin: 05/26/16 20:18 Dose:  600 mg


Lorazepam (Ativan)  2 mg IM Q6 PRN


   PRN Reason: Agitation


Metoprolol Tartrate (Lopressor)  50 mg PO Q12 [facility]


   Last Admin: 06/03/16 21:20 Dose:  50 mg


Nicotine (Nicoderm Cq)  1 patch TD DAILY [facility]


   Last Admin: 06/03/16 09:35 Dose:  1 patch


Quetiapine Fumarate (Seroquel)  25 mg PO HS [facility]


   Last Admin: 06/03/16 21:20 Dose:  25 mg











- Labs


Labs: 


 





 05/31/16 06:15 





 05/31/16 06:15 





 











PT  11.2 SECONDS (9.4-12.0)   12/14/15  05:20    


 


INR  1.05  (0.89-1.20)   12/14/15  05:20    


 


APTT  36.2 SECONDS (23.1-32.0)  H  12/14/15  05:20    














- Constitutional


Appears: Well, Non-toxic, No Acute Distress





- Head Exam


Head Exam: ATRAUMATIC, NORMAL INSPECTION, NORMOCEPHALIC





- Eye Exam


Eye Exam: EOMI, Normal appearance, PERRL


Pupil Exam: NORMAL ACCOMODATION, PERRL





- ENT Exam


ENT Exam: Mucous Membranes Moist, Normal Exam





- Neck Exam


Neck Exam: Full ROM, Normal Inspection.  absent: Lymphadenopathy





- Respiratory Exam


Respiratory Exam: Clear to Ausculation Bilateral, NORMAL BREATHING PATTERN





- Cardiovascular Exam


Cardiovascular Exam: REGULAR RHYTHM, +S1, +S2.  absent: Murmur





- GI/Abdominal Exam


GI & Abdominal Exam: Soft, Normal Bowel Sounds.  absent: Tenderness





- Extremities Exam


Extremities Exam: Full ROM, Normal Capillary Refill, Normal Inspection.  absent

: Joint Swelling, Pedal Edema





- Back Exam


Back Exam: NORMAL INSPECTION





- Neurological Exam


Neurological Exam: Alert, Awake, CN II-XII Intact, Normal Gait, Oriented x3





- Psychiatric Exam


Psychiatric exam: Normal Affect, Normal Mood





- Skin


Skin Exam: Dry, Intact, Normal Color, Warm





Assessment and Plan


(1) DVT prophylaxis


Status: Acute





(2) CAD (coronary artery disease)


Status: Acute





(3) Smoking


Status: Chronic





(4) Low back pain


Status: Chronic





(5) Agitation


Status: Acute





(6) Scrotal edema


Status: Chronic





(7) Prediabetes


Status: Acute








- Assessment and Plan (Free Text)


Assessment: 


(1) DVT prophylaxis


Assessment & Plan: 


scd and aehose


ambulation


Status: Acute





(2) CAD (coronary artery disease)


Assessment & Plan: 


cont meds


Status: Acute





(3) Smoking


Assessment & Plan: 


nicoderm


Status: Chronic





(4) Low back pain


Assessment & Plan: 


ibuprofen prn


Status: Chronic





(5) Agitation


Assessment & Plan: 


seroquel/depakote


ativan/haldol prn


Status: Acute





(6) Scrotal edema


Status: Chronic





(7) Prediabetes


Status: Acute


(1) DVT prophylaxis


Assessment & Plan: 


scd and aehose


ambulation


Status: Acute





(2) CAD (coronary artery disease)


Assessment & Plan: 


cont meds


Status: Acute





(3) Smoking


Assessment & Plan: 


nicoderm


Status: Chronic





(4) Low back pain


Assessment & Plan: 


ibuprofen prn


Status: Chronic





(5) Agitation


Assessment & Plan: 


seroquel/depakote


ativan/haldol prn


Status: Acute





(6) Scrotal edema


Status: Chronic





(7) Prediabetes


Status: Acute


diet/exercise

## 2016-06-05 RX ADMIN — DIVALPROEX SODIUM SCH MG: 500 TABLET, DELAYED RELEASE ORAL at 17:06

## 2016-06-05 RX ADMIN — DIVALPROEX SODIUM SCH MG: 500 TABLET, DELAYED RELEASE ORAL at 08:58

## 2016-06-05 NOTE — CP.PCM.PN
Subjective





- Date & Time of Evaluation


Date of Evaluation: 06/05/16


Time of Evaluation: 09:28





- Subjective


Subjective: 


pt comfortable in bed, no distress, no complaints.  calm and cooperative


court date 6/30/16


no f/c, n/v/d


ros negative





Objective





- Vital Signs/Intake and Output


Vital Signs (last 24 hours): 


 











Temp Pulse Resp BP Pulse Ox


 


 96.9 F L  62   20   105/71   97 


 


 06/05/16 08:21  06/05/16 08:59  06/05/16 08:21  06/05/16 08:59  06/05/16 08:21











- Medications


Medications: 


 Current Medications





Aspirin (Ecotrin)  81 mg PO DAILY [facility]


   Last Admin: 06/05/16 08:59 Dose:  81 mg


Divalproex Sodium (Depakote Dr(*Bid*))  500 mg PO BID [facility]


   Last Admin: 06/05/16 08:58 Dose:  500 mg


Enalapril Maleate (Vasotec)  10 mg PO DAILY [facility]


   Last Admin: 06/05/16 08:59 Dose:  10 mg


Famotidine (Pepcid)  20 mg PO BID [facility]


   Last Admin: 06/05/16 08:58 Dose:  20 mg


Haloperidol Lactate (Haldol)  5 mg IM Q6 PRN


   PRN Reason: Agitation


   Last Admin: 05/23/16 13:23 Dose:  5 mg


Ibuprofen (Motrin Tab)  600 mg PO Q8 PRN


   PRN Reason: Pain, moderate (4-7)


   Last Admin: 05/26/16 20:18 Dose:  600 mg


Lorazepam (Ativan)  2 mg IM Q6 PRN


   PRN Reason: Agitation


Metoprolol Tartrate (Lopressor)  50 mg PO Q12 [facility]


   Last Admin: 06/05/16 08:59 Dose:  50 mg


Nicotine (Nicoderm Cq)  1 patch TD DAILY [facility]


   Last Admin: 06/05/16 08:59 Dose:  1 patch


Quetiapine Fumarate (Seroquel)  25 mg PO HS [facility]


   Last Admin: 06/04/16 21:19 Dose:  25 mg











- Labs


Labs: 


 





 05/31/16 06:15 





 05/31/16 06:15 





 











PT  11.2 SECONDS (9.4-12.0)   12/14/15  05:20    


 


INR  1.05  (0.89-1.20)   12/14/15  05:20    


 


APTT  36.2 SECONDS (23.1-32.0)  H  12/14/15  05:20    














- Constitutional


Appears: Well, Non-toxic, No Acute Distress





- Head Exam


Head Exam: ATRAUMATIC, NORMAL INSPECTION, NORMOCEPHALIC





- Eye Exam


Eye Exam: EOMI, Normal appearance, PERRL


Pupil Exam: NORMAL ACCOMODATION, PERRL





- ENT Exam


ENT Exam: Mucous Membranes Moist, Normal Exam





- Neck Exam


Neck Exam: Full ROM, Normal Inspection.  absent: Lymphadenopathy





- Respiratory Exam


Respiratory Exam: Clear to Ausculation Bilateral, NORMAL BREATHING PATTERN





- Cardiovascular Exam


Cardiovascular Exam: REGULAR RHYTHM, +S1, +S2.  absent: Murmur





- GI/Abdominal Exam


GI & Abdominal Exam: Soft, Normal Bowel Sounds.  absent: Tenderness





- Extremities Exam


Extremities Exam: Full ROM, Normal Capillary Refill, Normal Inspection.  absent

: Joint Swelling, Pedal Edema





- Back Exam


Back Exam: NORMAL INSPECTION





- Neurological Exam


Neurological Exam: Alert, Awake, CN II-XII Intact, Normal Gait, Oriented x3





- Psychiatric Exam


Psychiatric exam: Normal Affect, Normal Mood





- Skin


Skin Exam: Dry, Intact, Normal Color, Warm





Assessment and Plan


(1) DVT prophylaxis


Assessment & Plan: 


scd and ae hose


ambulation


Status: Acute





(2) CAD (coronary artery disease)


Assessment & Plan: 


cont meds


Status: Acute





(3) Smoking


Assessment & Plan: 


nicoderm


Status: Chronic





(4) Low back pain


Assessment & Plan: 


ibuprofen prn


Status: Chronic





(5) Agitation


Assessment & Plan: 


seroquel/depakote


ativan/haldol prn


Status: Acute





(6) Scrotal edema


Status: Chronic





(7) Prediabetes


Status: Acute

## 2016-06-06 RX ADMIN — DIVALPROEX SODIUM SCH MG: 500 TABLET, DELAYED RELEASE ORAL at 08:25

## 2016-06-06 RX ADMIN — DIVALPROEX SODIUM SCH MG: 500 TABLET, DELAYED RELEASE ORAL at 16:20

## 2016-06-06 NOTE — CP.PCM.PN
Subjective





- Date & Time of Evaluation


Date of Evaluation: 06/06/16


Time of Evaluation: 07:21





- Subjective


Subjective: 


pt remains calm and coopertaive. no f/c, n/v/d. no agitation. pending court 

date 6/30/16


ros negative.





Objective





- Vital Signs/Intake and Output


Vital Signs (last 24 hours): 


 











Temp Pulse Resp BP Pulse Ox


 


 97.7 F   64   18   136/87   99 


 


 06/05/16 17:05  06/05/16 17:05  06/05/16 17:05  06/05/16 17:05  06/05/16 17:05











- Medications


Medications: 


 Current Medications





Aspirin (Ecotrin)  81 mg PO DAILY [facility]


   Last Admin: 06/05/16 08:59 Dose:  81 mg


Divalproex Sodium (Depakote Dr(*Bid*))  500 mg PO BID [facility]


   Last Admin: 06/05/16 17:06 Dose:  500 mg


Enalapril Maleate (Vasotec)  10 mg PO DAILY [facility]


   Last Admin: 06/05/16 08:59 Dose:  10 mg


Famotidine (Pepcid)  20 mg PO BID [facility]


   Last Admin: 06/05/16 17:06 Dose:  20 mg


Haloperidol Lactate (Haldol)  5 mg IM Q6 PRN


   PRN Reason: Agitation


   Last Admin: 05/23/16 13:23 Dose:  5 mg


Ibuprofen (Motrin Tab)  600 mg PO Q8 PRN


   PRN Reason: Pain, moderate (4-7)


   Last Admin: 05/26/16 20:18 Dose:  600 mg


Lorazepam (Ativan)  2 mg IM Q6 PRN


   PRN Reason: Agitation


Metoprolol Tartrate (Lopressor)  50 mg PO Q12 [facility]


   Last Admin: 06/05/16 21:28 Dose:  50 mg


Nicotine (Nicoderm Cq)  1 patch TD DAILY [facility]


   Last Admin: 06/05/16 08:59 Dose:  1 patch


Quetiapine Fumarate (Seroquel)  25 mg PO HS [facility]


   Last Admin: 06/05/16 21:28 Dose:  25 mg











- Labs


Labs: 


 





 05/31/16 06:15 





 05/31/16 06:15 





 











PT  11.2 SECONDS (9.4-12.0)   12/14/15  05:20    


 


INR  1.05  (0.89-1.20)   12/14/15  05:20    


 


APTT  36.2 SECONDS (23.1-32.0)  H  12/14/15  05:20    














- Constitutional


Appears: Well, Non-toxic, No Acute Distress





- Head Exam


Head Exam: ATRAUMATIC, NORMAL INSPECTION, NORMOCEPHALIC





- Eye Exam


Eye Exam: EOMI, Normal appearance, PERRL


Pupil Exam: NORMAL ACCOMODATION, PERRL





- ENT Exam


ENT Exam: Mucous Membranes Moist, Normal Exam





- Neck Exam


Neck Exam: Full ROM, Normal Inspection.  absent: Lymphadenopathy





- Respiratory Exam


Respiratory Exam: Clear to Ausculation Bilateral, NORMAL BREATHING PATTERN





- Cardiovascular Exam


Cardiovascular Exam: REGULAR RHYTHM, +S1, +S2.  absent: Murmur





- GI/Abdominal Exam


GI & Abdominal Exam: Soft, Normal Bowel Sounds.  absent: Tenderness





- Extremities Exam


Extremities Exam: Full ROM, Normal Capillary Refill, Normal Inspection.  absent

: Joint Swelling, Pedal Edema





- Back Exam


Back Exam: NORMAL INSPECTION





- Neurological Exam


Neurological Exam: Alert, Awake, CN II-XII Intact, Normal Gait, Oriented x3





- Psychiatric Exam


Psychiatric exam: Normal Affect, Normal Mood





- Skin


Skin Exam: Dry, Intact, Normal Color, Warm





Assessment and Plan


(1) DVT prophylaxis


Status: Acute





(2) CAD (coronary artery disease)


Status: Acute





(3) Smoking


Status: Chronic





(4) Low back pain


Status: Chronic





(5) Agitation


Status: Acute





(6) Scrotal edema


Status: Chronic





(7) Prediabetes


Status: Acute








- Assessment and Plan (Free Text)


Assessment: 


(1) DVT prophylaxis


Assessment & Plan: 


scd and ae hose


ambulation


Status: Acute





(2) CAD (coronary artery disease)


Assessment & Plan: 


cont meds


Status: Acute





(3) Smoking


Assessment & Plan: 


nicoderm


Status: Chronic





(4) Low back pain


Assessment & Plan: 


ibuprofen prn


Status: Chronic





(5) Agitation


Assessment & Plan: 


seroquel/depakote


ativan/haldol prn


Status: Acute





(6) Scrotal edema


Status: Chronic





(7) Prediabetes


Status: Acute

## 2016-06-07 RX ADMIN — DIVALPROEX SODIUM SCH MG: 500 TABLET, DELAYED RELEASE ORAL at 16:16

## 2016-06-07 RX ADMIN — DIVALPROEX SODIUM SCH MG: 500 TABLET, DELAYED RELEASE ORAL at 08:58

## 2016-06-07 NOTE — CP.PCM.PN
Subjective





- Date & Time of Evaluation


Date of Evaluation: 06/07/16


Time of Evaluation: 07:38





- Subjective


Subjective: 


pt comfortable in bed, nod istress, no complaints. no f/c, n/v/d


calma dn cooperative


ros negative


court date 6/30/16





Objective





- Vital Signs/Intake and Output


Vital Signs (last 24 hours): 


 











Temp Pulse Resp BP Pulse Ox


 


 97.7 F   64   19   125/82   98 


 


 06/06/16 16:14  06/06/16 21:32  06/06/16 16:14  06/06/16 21:32  06/06/16 16:14











- Medications


Medications: 


 Current Medications





Aspirin (Ecotrin)  81 mg PO DAILY [facility]


   Last Admin: 06/06/16 08:26 Dose:  81 mg


Divalproex Sodium (Depakote Dr(*Bid*))  500 mg PO BID [facility]


   Last Admin: 06/06/16 16:20 Dose:  500 mg


Enalapril Maleate (Vasotec)  10 mg PO DAILY [facility]


   Last Admin: 06/06/16 08:25 Dose:  10 mg


Famotidine (Pepcid)  20 mg PO BID [facility]


   Last Admin: 06/06/16 16:20 Dose:  20 mg


Haloperidol Lactate (Haldol)  5 mg IM Q6 PRN


   PRN Reason: Agitation


   Last Admin: 05/23/16 13:23 Dose:  5 mg


Ibuprofen (Motrin Tab)  600 mg PO Q8 PRN


   PRN Reason: Pain, moderate (4-7)


   Last Admin: 05/26/16 20:18 Dose:  600 mg


Lorazepam (Ativan)  2 mg IM Q6 PRN


   PRN Reason: Agitation


Metoprolol Tartrate (Lopressor)  50 mg PO Q12 [facility]


   Last Admin: 06/06/16 21:32 Dose:  50 mg


Nicotine (Nicoderm Cq)  1 patch TD DAILY [facility]


   Last Admin: 06/06/16 08:26 Dose:  1 patch


Quetiapine Fumarate (Seroquel)  25 mg PO HS [facility]


   Last Admin: 06/06/16 21:33 Dose:  25 mg











- Labs


Labs: 


 





 05/31/16 06:15 





 05/31/16 06:15 





 











PT  11.2 SECONDS (9.4-12.0)   12/14/15  05:20    


 


INR  1.05  (0.89-1.20)   12/14/15  05:20    


 


APTT  36.2 SECONDS (23.1-32.0)  H  12/14/15  05:20    














- Constitutional


Appears: Well, Non-toxic, No Acute Distress





- Head Exam


Head Exam: ATRAUMATIC, NORMAL INSPECTION, NORMOCEPHALIC





- Eye Exam


Eye Exam: EOMI, Normal appearance, PERRL


Pupil Exam: NORMAL ACCOMODATION, PERRL





- ENT Exam


ENT Exam: Mucous Membranes Moist, Normal Exam





- Neck Exam


Neck Exam: Full ROM, Normal Inspection.  absent: Lymphadenopathy





- Respiratory Exam


Respiratory Exam: Clear to Ausculation Bilateral, NORMAL BREATHING PATTERN





- Cardiovascular Exam


Cardiovascular Exam: REGULAR RHYTHM, +S1, +S2.  absent: Murmur





- GI/Abdominal Exam


GI & Abdominal Exam: Soft, Normal Bowel Sounds.  absent: Tenderness





- Extremities Exam


Extremities Exam: Full ROM, Normal Capillary Refill, Normal Inspection.  absent

: Joint Swelling, Pedal Edema





- Back Exam


Back Exam: NORMAL INSPECTION





- Neurological Exam


Neurological Exam: Alert, Awake, CN II-XII Intact, Normal Gait, Oriented x3





- Psychiatric Exam


Psychiatric exam: Normal Affect, Normal Mood





- Skin


Skin Exam: Dry, Intact, Normal Color, Warm





Assessment and Plan


(1) DVT prophylaxis


Assessment & Plan: 


scd and ae hose


ambulation


Status: Acute





(2) CAD (coronary artery disease)


Assessment & Plan: 


cont meds


Status: Acute





(3) Smoking


Assessment & Plan: 


nicoderm


Status: Chronic





(4) Low back pain


Assessment & Plan: 


ibuprofen prn


Status: Chronic





(5) Agitation


Assessment & Plan: 


seroquel/depakote


ativan/haldol prn


Status: Acute





(6) Scrotal edema


Status: Chronic





(7) Prediabetes


Assessment & Plan: 


diet control


Status: Acute

## 2016-06-08 RX ADMIN — DIVALPROEX SODIUM SCH MG: 500 TABLET, DELAYED RELEASE ORAL at 16:17

## 2016-06-08 RX ADMIN — DIVALPROEX SODIUM SCH MG: 500 TABLET, DELAYED RELEASE ORAL at 08:45

## 2016-06-08 NOTE — CP.PCM.PN
Subjective





- Date & Time of Evaluation


Date of Evaluation: 06/08/16


Time of Evaluation: 07:15





- Subjective


Subjective: 


pt  comfortable in bed, nod istress, no f/c, n/v/d.


pt remains requiring guardian


court date 6/30/16


ros negative except as mentioned


calm and cooeprative





Objective





- Vital Signs/Intake and Output


Vital Signs (last 24 hours): 


 











Temp Pulse Resp BP Pulse Ox


 


 98.2 F   68   19   116/74   968 H


 


 06/08/16 00:00  06/08/16 00:00  06/08/16 00:00  06/08/16 00:00  06/08/16 00:00











- Medications


Medications: 


 Current Medications





Aspirin (Ecotrin)  81 mg PO DAILY [facility]


   Last Admin: 06/07/16 08:59 Dose:  81 mg


Divalproex Sodium (Depakote Dr(*Bid*))  500 mg PO BID [facility]


   Last Admin: 06/07/16 16:16 Dose:  500 mg


Enalapril Maleate (Vasotec)  10 mg PO DAILY [facility]


   Last Admin: 06/07/16 16:16 Dose:  10 mg


Famotidine (Pepcid)  20 mg PO BID [facility]


   Last Admin: 06/07/16 16:15 Dose:  20 mg


Haloperidol Lactate (Haldol)  5 mg IM Q6 PRN


   PRN Reason: Agitation


   Last Admin: 05/23/16 13:23 Dose:  5 mg


Ibuprofen (Motrin Tab)  600 mg PO Q8 PRN


   PRN Reason: Pain, moderate (4-7)


   Last Admin: 05/26/16 20:18 Dose:  600 mg


Lorazepam (Ativan)  2 mg IM Q6 PRN


   PRN Reason: Agitation


Metoprolol Tartrate (Lopressor)  50 mg PO Q12 [facility]


   Last Admin: 06/07/16 21:06 Dose:  50 mg


Nicotine (Nicoderm Cq)  1 patch TD DAILY [facility]


   Last Admin: 06/07/16 09:00 Dose:  1 patch


Quetiapine Fumarate (Seroquel)  25 mg PO HS [facility]


   Last Admin: 06/07/16 21:06 Dose:  25 mg











- Labs


Labs: 


 





 05/31/16 06:15 





 05/31/16 06:15 





 











PT  11.2 SECONDS (9.4-12.0)   12/14/15  05:20    


 


INR  1.05  (0.89-1.20)   12/14/15  05:20    


 


APTT  36.2 SECONDS (23.1-32.0)  H  12/14/15  05:20    














- Constitutional


Appears: Well, Non-toxic, No Acute Distress





- Head Exam


Head Exam: ATRAUMATIC, NORMAL INSPECTION, NORMOCEPHALIC





- Eye Exam


Eye Exam: EOMI, Normal appearance, PERRL


Pupil Exam: NORMAL ACCOMODATION, PERRL





- ENT Exam


ENT Exam: Mucous Membranes Moist, Normal Exam





- Neck Exam


Neck Exam: Full ROM, Normal Inspection.  absent: Lymphadenopathy





- Respiratory Exam


Respiratory Exam: Clear to Ausculation Bilateral, NORMAL BREATHING PATTERN





- Cardiovascular Exam


Cardiovascular Exam: REGULAR RHYTHM, +S1, +S2.  absent: Murmur





- GI/Abdominal Exam


GI & Abdominal Exam: Soft, Normal Bowel Sounds.  absent: Tenderness





- Extremities Exam


Extremities Exam: Full ROM, Normal Capillary Refill, Normal Inspection.  absent

: Joint Swelling, Pedal Edema





- Back Exam


Back Exam: NORMAL INSPECTION





- Neurological Exam


Neurological Exam: Alert, Awake, CN II-XII Intact, Normal Gait, Oriented x3





- Psychiatric Exam


Psychiatric exam: Normal Affect, Normal Mood





- Skin


Skin Exam: Dry, Intact, Normal Color, Warm





Assessment and Plan


(1) DVT prophylaxis


Status: Acute





(2) CAD (coronary artery disease)


Status: Acute





(3) Smoking


Status: Chronic





(4) Low back pain


Status: Chronic





(5) Agitation


Status: Acute





(6) Scrotal edema


Status: Chronic





(7) Prediabetes


Status: Acute








- Assessment and Plan (Free Text)


Assessment: 


(1) DVT prophylaxis


Assessment & Plan: 


scd and ae hose


ambulation


Status: Acute





(2) CAD (coronary artery disease)


Assessment & Plan: 


cont meds


Status: Acute





(3) Smoking


Assessment & Plan: 


nicoderm


Status: Chronic





(4) Low back pain


Assessment & Plan: 


ibuprofen prn


Status: Chronic





(5) Agitation


Assessment & Plan: 


seroquel/depakote


ativan/haldol prn


Status: Acute





(6) Scrotal edema


Status: Chronic





(7) Prediabetes


Assessment & Plan: 


diet control


Status: Acute

## 2016-06-09 RX ADMIN — DIVALPROEX SODIUM SCH MG: 500 TABLET, DELAYED RELEASE ORAL at 08:24

## 2016-06-09 RX ADMIN — DIVALPROEX SODIUM SCH MG: 500 TABLET, DELAYED RELEASE ORAL at 17:38

## 2016-06-09 NOTE — CP.PCM.PN
Subjective





- Date & Time of Evaluation


Date of Evaluation: 06/09/16


Time of Evaluation: 07:40





- Subjective


Subjective: 


pt comfortable in bed, no f/c, n/v/d, calm and cooperative


ros negative


court date 6/30/16





Objective





- Vital Signs/Intake and Output


Vital Signs (last 24 hours): 


 











Temp Pulse Resp BP Pulse Ox


 


 97.5 F L  75   19   131/80   100 


 


 06/09/16 00:00  06/09/16 00:00  06/09/16 00:00  06/09/16 00:00  06/09/16 00:00











- Medications


Medications: 


 Current Medications





Aspirin (Ecotrin)  81 mg PO DAILY [facility]


   Last Admin: 06/08/16 08:45 Dose:  81 mg


Divalproex Sodium (Depakote Dr(*Bid*))  500 mg PO BID [facility]


   Last Admin: 06/08/16 16:17 Dose:  500 mg


Enalapril Maleate (Vasotec)  10 mg PO DAILY [facility]


   Last Admin: 06/08/16 08:47 Dose:  10 mg


Famotidine (Pepcid)  20 mg PO BID [facility]


   Last Admin: 06/08/16 16:18 Dose:  20 mg


Haloperidol Lactate (Haldol)  5 mg IM Q6 PRN


   PRN Reason: Agitation


   Last Admin: 05/23/16 13:23 Dose:  5 mg


Ibuprofen (Motrin Tab)  600 mg PO Q8 PRN


   PRN Reason: Pain, moderate (4-7)


   Last Admin: 05/26/16 20:18 Dose:  600 mg


Lorazepam (Ativan)  2 mg IM Q6 PRN


   PRN Reason: Agitation


Metoprolol Tartrate (Lopressor)  50 mg PO Q12 [facility]


   Last Admin: 06/08/16 21:02 Dose:  50 mg


Nicotine (Nicoderm Cq)  1 patch TD DAILY [facility]


   Last Admin: 06/08/16 08:46 Dose:  1 patch


Quetiapine Fumarate (Seroquel)  25 mg PO HS [facility]


   Last Admin: 06/08/16 21:02 Dose:  25 mg











- Labs


Labs: 


 





 05/31/16 06:15 





 05/31/16 06:15 





 











PT  11.2 SECONDS (9.4-12.0)   12/14/15  05:20    


 


INR  1.05  (0.89-1.20)   12/14/15  05:20    


 


APTT  36.2 SECONDS (23.1-32.0)  H  12/14/15  05:20    














- Constitutional


Appears: Well, Non-toxic, No Acute Distress





- Head Exam


Head Exam: ATRAUMATIC, NORMAL INSPECTION, NORMOCEPHALIC





- Eye Exam


Eye Exam: EOMI, Normal appearance, PERRL


Pupil Exam: NORMAL ACCOMODATION, PERRL





- ENT Exam


ENT Exam: Mucous Membranes Moist, Normal Exam





- Neck Exam


Neck Exam: Full ROM, Normal Inspection.  absent: Lymphadenopathy





- Respiratory Exam


Respiratory Exam: Clear to Ausculation Bilateral, NORMAL BREATHING PATTERN





- Cardiovascular Exam


Cardiovascular Exam: REGULAR RHYTHM, +S1, +S2.  absent: Murmur





- GI/Abdominal Exam


GI & Abdominal Exam: Soft, Normal Bowel Sounds.  absent: Tenderness





- Extremities Exam


Extremities Exam: Full ROM, Normal Capillary Refill, Normal Inspection.  absent

: Joint Swelling, Pedal Edema





- Back Exam


Back Exam: NORMAL INSPECTION





- Neurological Exam


Neurological Exam: Alert, Awake, CN II-XII Intact, Normal Gait, Oriented x3





- Psychiatric Exam


Psychiatric exam: Normal Affect, Normal Mood





- Skin


Skin Exam: Dry, Intact, Normal Color, Warm





Assessment and Plan


(1) DVT prophylaxis


Assessment & Plan: 


scd adn aehose


ambulation


Status: Acute





(2) CAD (coronary artery disease)


Assessment & Plan: 


cont meds


Status: Acute





(3) Smoking


Assessment & Plan: 


nicoderm


Status: Chronic





(4) Low back pain


Assessment & Plan: 


ibuprofen prn


Status: Chronic





(5) Agitation


Assessment & Plan: 


seroquel/depakote


ativan/haldol prn


Status: Acute





(6) Scrotal edema


Status: Chronic





(7) Prediabetes


Assessment & Plan: 


diet control


Status: Acute

## 2016-06-10 RX ADMIN — DIVALPROEX SODIUM SCH MG: 500 TABLET, DELAYED RELEASE ORAL at 08:26

## 2016-06-10 RX ADMIN — DIVALPROEX SODIUM SCH MG: 500 TABLET, DELAYED RELEASE ORAL at 16:21

## 2016-06-10 NOTE — CP.PCM.PN
Subjective





- Date & Time of Evaluation


Date of Evaluation: 06/10/16


Time of Evaluation: 11:33





- Subjective


Subjective: 


pt comfortable in bed, no f/c, n/v/d. no complaints. calm adn cooperative


ros negaitve


court date 6/30/16





Objective





- Vital Signs/Intake and Output


Vital Signs (last 24 hours): 


 











Temp Pulse Resp BP Pulse Ox


 


 97.3 F L  62   20   125/81   98 


 


 06/10/16 08:23  06/10/16 08:26  06/10/16 08:23  06/10/16 08:26  06/10/16 08:23











- Medications


Medications: 


 Current Medications





Aspirin (Ecotrin)  81 mg PO DAILY [facility]


   Last Admin: 06/10/16 08:26 Dose:  81 mg


Divalproex Sodium (Depakote Dr(*Bid*))  500 mg PO BID [facility]


   Last Admin: 06/10/16 08:26 Dose:  500 mg


Enalapril Maleate (Vasotec)  10 mg PO DAILY [facility]


   Last Admin: 06/10/16 08:27 Dose:  10 mg


Famotidine (Pepcid)  20 mg PO BID [facility]


   Last Admin: 06/10/16 08:27 Dose:  20 mg


Haloperidol Lactate (Haldol)  5 mg IM Q6 PRN


   PRN Reason: Agitation


   Last Admin: 05/23/16 13:23 Dose:  5 mg


Ibuprofen (Motrin Tab)  600 mg PO Q8 PRN


   PRN Reason: Pain, moderate (4-7)


   Last Admin: 05/26/16 20:18 Dose:  600 mg


Lorazepam (Ativan)  2 mg IM Q6 PRN


   PRN Reason: Agitation


Metoprolol Tartrate (Lopressor)  50 mg PO Q12 [facility]


   Last Admin: 06/10/16 08:26 Dose:  50 mg


Nicotine (Nicoderm Cq)  1 patch TD DAILY [facility]


   Last Admin: 06/10/16 08:27 Dose:  1 patch


Quetiapine Fumarate (Seroquel)  25 mg PO HS [facility]


   Last Admin: 06/09/16 22:23 Dose:  25 mg











- Labs


Labs: 


 





 05/31/16 06:15 





 05/31/16 06:15 





 











PT  11.2 SECONDS (9.4-12.0)   12/14/15  05:20    


 


INR  1.05  (0.89-1.20)   12/14/15  05:20    


 


APTT  36.2 SECONDS (23.1-32.0)  H  12/14/15  05:20    














- Constitutional


Appears: Well, Non-toxic, No Acute Distress





- Head Exam


Head Exam: ATRAUMATIC, NORMAL INSPECTION, NORMOCEPHALIC





- Eye Exam


Eye Exam: EOMI, Normal appearance, PERRL


Pupil Exam: NORMAL ACCOMODATION, PERRL





- ENT Exam


ENT Exam: Mucous Membranes Moist, Normal Exam





- Neck Exam


Neck Exam: Full ROM, Normal Inspection.  absent: Lymphadenopathy





- Respiratory Exam


Respiratory Exam: Clear to Ausculation Bilateral, NORMAL BREATHING PATTERN





- Cardiovascular Exam


Cardiovascular Exam: REGULAR RHYTHM, +S1, +S2.  absent: Murmur





- GI/Abdominal Exam


GI & Abdominal Exam: Soft, Normal Bowel Sounds.  absent: Tenderness





- Extremities Exam


Extremities Exam: Full ROM, Normal Capillary Refill, Normal Inspection.  absent

: Joint Swelling, Pedal Edema





- Back Exam


Back Exam: NORMAL INSPECTION





- Neurological Exam


Neurological Exam: Alert, Awake, CN II-XII Intact, Normal Gait, Oriented x3





- Psychiatric Exam


Psychiatric exam: Normal Affect, Normal Mood





- Skin


Skin Exam: Dry, Intact, Normal Color, Warm





Assessment and Plan


(1) DVT prophylaxis


Status: Acute





(2) CAD (coronary artery disease)


Status: Acute





(3) Smoking


Status: Chronic





(4) Low back pain


Status: Chronic





(5) Agitation


Status: Acute





(6) Scrotal edema


Status: Chronic





(7) Prediabetes


Status: Acute








- Assessment and Plan (Free Text)


Assessment: 


(1) DVT prophylaxis


Assessment & Plan: 


scd adn aehose


ambulation


Status: Acute





(2) CAD (coronary artery disease)


Assessment & Plan: 


cont meds


Status: Acute





(3) Smoking


Assessment & Plan: 


nicoderm


Status: Chronic





(4) Low back pain


Assessment & Plan: 


ibuprofen prn


Status: Chronic





(5) Agitation


Assessment & Plan: 


seroquel/depakote


ativan/haldol prn


Status: Acute





(6) Scrotal edema


Status: Chronic





(7) Prediabetes


Assessment & Plan: 


diet control


Status: Acute

## 2016-06-11 RX ADMIN — DIVALPROEX SODIUM SCH MG: 500 TABLET, DELAYED RELEASE ORAL at 16:50

## 2016-06-11 RX ADMIN — DIVALPROEX SODIUM SCH MG: 500 TABLET, DELAYED RELEASE ORAL at 08:28

## 2016-06-11 NOTE — PN
DATE: 06/11/2016



The patient evaluated in bed, calm, cooperative, no distress.  No fever, chills, nausea, vomiting, di
arrhea.  The patient remains pending court date 6/30/16.  Patient remains unable to care for himself 
and requires guardianship.



REVIEW OF SYSTEMS:  As noted.



OBJECTIVE FINDINGS:

GENERAL:  Alert and oriented, at baseline.

HEART:  Regular rate and rhythm.  No murmurs, rubs or gallops.

LUNGS:  Clear in all fields bilaterally.  Respirations deep and unlabored.

ABDOMEN:  Soft, nontender.  Bowel sounds x 4.

EXTREMITIES:  Distal pulses, motor sensation intact.  Cap refill brisk.



DIAGNOSES AND PLAN:  Coronary artery disease, status post cardiac arrest.  Continue all medications.



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  Continue Seroquel and Depakote.  Continue Ativan and H
aldol as needed for agitation.  



DVT prophylaxis, SCD and AE hose.



Smoking cessation.  Nicoderm patch.



Lower back pain.  Ibuprofen as needed, out of bed to chair.  



The patient will be discharged once guardianship is established and a safe disposition location is lo
[facility].





__________________________________________

Fan SILVER







cc:



DD: 06/11/2016 17:49:47  [id number]

TT: 06/11/2016 18:08:50

Confirmation # [id number]

Dictation # [id number]

[email or web address]

## 2016-06-11 NOTE — CP.PCM.PN
Subjective





- Date & Time of Evaluation


Date of Evaluation: 06/11/16


Time of Evaluation: 12:18





- Subjective


Subjective: 


consult dictated


pending guardiansship, court 6/30/16


no compalitns, no agitation





Objective





- Vital Signs/Intake and Output


Vital Signs (last 24 hours): 


 











Temp Pulse Resp BP Pulse Ox


 


 97.3 F L  66   20   112/73   99 


 


 06/11/16 07:35  06/11/16 08:28  06/11/16 07:35  06/11/16 08:28  06/11/16 07:35











- Medications


Medications: 


 Current Medications





Aspirin (Ecotrin)  81 mg PO DAILY [facility]


   Last Admin: 06/11/16 08:28 Dose:  81 mg


Divalproex Sodium (Depakote Dr(*Bid*))  500 mg PO BID [facility]


   Last Admin: 06/11/16 08:28 Dose:  500 mg


Enalapril Maleate (Vasotec)  10 mg PO DAILY [facility]


   Last Admin: 06/11/16 08:29 Dose:  10 mg


Famotidine (Pepcid)  20 mg PO BID [facility]


   Last Admin: 06/11/16 08:29 Dose:  20 mg


Haloperidol Lactate (Haldol)  5 mg IM Q6 PRN


   PRN Reason: Agitation


   Last Admin: 05/23/16 13:23 Dose:  5 mg


Ibuprofen (Motrin Tab)  600 mg PO Q8 PRN


   PRN Reason: Pain, moderate (4-7)


   Last Admin: 05/26/16 20:18 Dose:  600 mg


Lorazepam (Ativan)  2 mg IM Q6 PRN


   PRN Reason: Agitation


Metoprolol Tartrate (Lopressor)  50 mg PO Q12 [facility]


   Last Admin: 06/11/16 08:28 Dose:  50 mg


Nicotine (Nicoderm Cq)  1 patch TD DAILY [facility]


   Last Admin: 06/11/16 08:29 Dose:  1 patch


Quetiapine Fumarate (Seroquel)  25 mg PO HS [facility]


   Last Admin: 06/10/16 21:34 Dose:  25 mg











- Labs


Labs: 


 





 05/31/16 06:15 





 05/31/16 06:15 





 











PT  11.2 SECONDS (9.4-12.0)   12/14/15  05:20    


 


INR  1.05  (0.89-1.20)   12/14/15  05:20    


 


APTT  36.2 SECONDS (23.1-32.0)  H  12/14/15  05:20    














Assessment and Plan


(1) DVT prophylaxis


Status: Acute





(2) CAD (coronary artery disease)


Status: Acute





(3) Smoking


Status: Chronic





(4) Low back pain


Status: Chronic





(5) Agitation


Status: Acute





(6) Scrotal edema


Status: Chronic





(7) Prediabetes


Status: Acute

## 2016-06-12 RX ADMIN — DIVALPROEX SODIUM SCH MG: 500 TABLET, DELAYED RELEASE ORAL at 09:25

## 2016-06-12 RX ADMIN — DIVALPROEX SODIUM SCH MG: 500 TABLET, DELAYED RELEASE ORAL at 16:44

## 2016-06-12 NOTE — CP.PCM.PN
Subjective





- Date & Time of Evaluation


Date of Evaluation: 06/12/16


Time of Evaluation: 10:57





- Subjective


Subjective: 


pt comfortable in bed, no distress, no complaints. nof /c, n/v/d


court date 6/30/16


ros negative





Objective





- Vital Signs/Intake and Output


Vital Signs (last 24 hours): 


 











Temp Pulse Resp BP Pulse Ox


 


 97.4 F L  71   20   118/74   97 


 


 06/12/16 07:42  06/12/16 09:39  06/12/16 07:42  06/12/16 09:39  06/12/16 07:42











- Medications


Medications: 


 Current Medications





Aspirin (Ecotrin)  81 mg PO DAILY [facility]


   Last Admin: 06/12/16 09:25 Dose:  81 mg


Divalproex Sodium (Depakote Dr(*Bid*))  500 mg PO BID [facility]


   Last Admin: 06/12/16 09:25 Dose:  500 mg


Enalapril Maleate (Vasotec)  10 mg PO DAILY [facility]


   Last Admin: 06/12/16 09:40 Dose:  10 mg


Famotidine (Pepcid)  20 mg PO BID [facility]


   Last Admin: 06/12/16 09:28 Dose:  20 mg


Haloperidol Lactate (Haldol)  5 mg IM Q6 PRN


   PRN Reason: Agitation


   Last Admin: 05/23/16 13:23 Dose:  5 mg


Ibuprofen (Motrin Tab)  600 mg PO Q8 PRN


   PRN Reason: Pain, moderate (4-7)


   Last Admin: 06/11/16 16:53 Dose:  600 mg


Lorazepam (Ativan)  2 mg IM Q6 PRN


   PRN Reason: Agitation


Metoprolol Tartrate (Lopressor)  50 mg PO Q12 [facility]


   Last Admin: 06/12/16 09:39 Dose:  50 mg


Nicotine (Nicoderm Cq)  1 patch TD DAILY [facility]


   Last Admin: 06/11/16 08:29 Dose:  1 patch


Quetiapine Fumarate (Seroquel)  25 mg PO HS [facility]


   Last Admin: 06/11/16 21:05 Dose:  25 mg











- Labs


Labs: 


 





 05/31/16 06:15 





 05/31/16 06:15 





 











PT  11.2 SECONDS (9.4-12.0)   12/14/15  05:20    


 


INR  1.05  (0.89-1.20)   12/14/15  05:20    


 


APTT  36.2 SECONDS (23.1-32.0)  H  12/14/15  05:20    














- Constitutional


Appears: Well, Non-toxic, No Acute Distress





- Head Exam


Head Exam: ATRAUMATIC, NORMAL INSPECTION, NORMOCEPHALIC





- Eye Exam


Eye Exam: EOMI, Normal appearance, PERRL


Pupil Exam: NORMAL ACCOMODATION, PERRL





- ENT Exam


ENT Exam: Mucous Membranes Moist, Normal Exam





- Neck Exam


Neck Exam: Full ROM, Normal Inspection.  absent: Lymphadenopathy





- Respiratory Exam


Respiratory Exam: Clear to Ausculation Bilateral, NORMAL BREATHING PATTERN





- Cardiovascular Exam


Cardiovascular Exam: REGULAR RHYTHM, +S1, +S2.  absent: Murmur





- GI/Abdominal Exam


GI & Abdominal Exam: Soft, Normal Bowel Sounds.  absent: Tenderness





- Extremities Exam


Extremities Exam: Full ROM, Normal Capillary Refill, Normal Inspection.  absent

: Joint Swelling, Pedal Edema





- Back Exam


Back Exam: NORMAL INSPECTION





- Neurological Exam


Neurological Exam: Alert, Awake, CN II-XII Intact, Normal Gait, Oriented x3





- Psychiatric Exam


Psychiatric exam: Normal Affect, Normal Mood





- Skin


Skin Exam: Dry, Intact, Normal Color, Warm





Assessment and Plan


(1) DVT prophylaxis


Status: Acute





(2) CAD (coronary artery disease)


Status: Acute





(3) Smoking


Status: Chronic





(4) Low back pain


Status: Chronic





(5) Agitation


Status: Acute





(6) Scrotal edema


Status: Chronic





(7) Prediabetes


Status: Acute








- Assessment and Plan (Free Text)


Assessment: 


(1) DVT prophylaxis


Assessment & Plan: 


scd adn aehose


ambulation


Status: Acute





(2) CAD (coronary artery disease)


Assessment & Plan: 


cont meds


Status: Acute





(3) Smoking


Assessment & Plan: 


nicoderm


Status: Chronic





(4) Low back pain


Assessment & Plan: 


ibuprofen prn


Status: Chronic





(5) Agitation


Assessment & Plan: 


seroquel/depakote


ativan/haldol prn


Status: Acute





(6) Scrotal edema


Status: Chronic





(7) Prediabetes


Assessment & Plan: 


diet control


Status: Acute

## 2016-06-13 RX ADMIN — DIVALPROEX SODIUM SCH MG: 500 TABLET, DELAYED RELEASE ORAL at 08:39

## 2016-06-13 RX ADMIN — DIVALPROEX SODIUM SCH MG: 500 TABLET, DELAYED RELEASE ORAL at 16:37

## 2016-06-13 NOTE — [email or web address]
Subjective





- Date & Time of Evaluation


Date of Evaluation: 06/13/16


Time of Evaluation: 07:42





- Subjective


Subjective: 


consult dictated


pending guardiansship, court 6/30/16


no compalitns, no agitation





Objective





- Vital Signs/Intake and Output


Vital Signs (last 24 hours): 


 











Temp Pulse Resp BP Pulse Ox


 


 97.9 F   70   19   138/65   99 


 


 06/13/16 00:21  06/13/16 00:21  06/13/16 00:21  06/13/16 00:21  06/13/16 00:21











- Medications


Medications: 


 Current Medications





Aspirin (Ecotrin)  81 mg PO DAILY [facility]


   Last Admin: 06/12/16 09:25 Dose:  81 mg


Divalproex Sodium (Depakote Dr(*Bid*))  500 mg PO BID [facility]


   Last Admin: 06/12/16 16:44 Dose:  500 mg


Enalapril Maleate (Vasotec)  10 mg PO DAILY [facility]


   Last Admin: 06/12/16 09:40 Dose:  10 mg


Famotidine (Pepcid)  20 mg PO BID [facility]


   Last Admin: 06/12/16 17:03 Dose:  20 mg


Haloperidol Lactate (Haldol)  5 mg IM Q6 PRN


   PRN Reason: Agitation


   Last Admin: 05/23/16 13:23 Dose:  5 mg


Ibuprofen (Motrin Tab)  600 mg PO Q8 PRN


   PRN Reason: Pain, moderate (4-7)


   Last Admin: 06/11/16 16:53 Dose:  600 mg


Lorazepam (Ativan)  2 mg IM Q6 PRN


   PRN Reason: Agitation


Metoprolol Tartrate (Lopressor)  50 mg PO Q12 [facility]


   Last Admin: 06/12/16 20:54 Dose:  50 mg


Nicotine (Nicoderm Cq)  1 patch TD DAILY [facility]


   Last Admin: 06/12/16 13:43 Dose:  1 patch


Quetiapine Fumarate (Seroquel)  25 mg PO HS [facility]


   Last Admin: 06/12/16 22:50 Dose:  25 mg











- Labs


Labs: 


 





 05/31/16 06:15 





 05/31/16 06:15 





 











PT  11.2 SECONDS (9.4-12.0)   12/14/15  05:20    


 


INR  1.05  (0.89-1.20)   12/14/15  05:20    


 


APTT  36.2 SECONDS (23.1-32.0)  H  12/14/15  05:20    














Assessment and Plan


(1) DVT prophylaxis


Status: Acute





(2) CAD (coronary artery disease)


Status: Acute





(3) Smoking


Status: Chronic





(4) Low back pain


Status: Chronic





(5) Agitation


Status: Acute





(6) Scrotal edema


Status: Chronic





(7) Prediabetes


Status: Acute

## 2016-06-13 NOTE — PN
DATE: 06/13/2016



The patient evaluated in bed.  No complaints offered.  No fever, chills, nausea, vomiting, diarrhea. 
 The patient is calm and cooperative, pending court date 6/30/16. 



REVIEW OF SYSTEMS:  Negative.  



OBJECTIVE:

GENERAL:  Alert and oriented at baseline.

HEART:  Regular rate and rhythm.  No murmurs, rubs or gallops.

LUNGS:  Clear in all fields was bilaterally. 

ABDOMEN:  Soft.  Bowel sounds x 4.

EXTREMITIES:  Distal pulses, motor sensation intact.  Cap refill brisk.



ASSESSMENT AND PLAN:  Coronary artery disease status post cardiac arrest.  Continue all medications _
____ neurocognitive disorder, Seroquel, Depakote, Ativan and Haldol p.r.n.  DVT prophylaxis, SCD and 
AE hose, ambulation.  I spoke to the patient _____Nicoderm, low back pain, ibuprofen p.r.n.  We will 
discharge the patient when guardianship is obtained and safe disposition.





__________________________________________

Fan SILVER







cc:



DD: 06/13/2016 12:58:43  1505

TT: 06/13/2016 13:15:15

Confirmation # [id number]

Dictation # [id number]

zia

## 2016-06-14 RX ADMIN — DIVALPROEX SODIUM SCH MG: 500 TABLET, DELAYED RELEASE ORAL at 08:10

## 2016-06-14 RX ADMIN — DIVALPROEX SODIUM SCH MG: 500 TABLET, DELAYED RELEASE ORAL at 16:25

## 2016-06-14 NOTE — CP.PCM.PN
Subjective





- Date & Time of Evaluation


Date of Evaluation: 06/14/16


Time of Evaluation: 07:26





- Subjective


Subjective: 


consult dictated


pending court date


remains unbale to care for self





Objective





- Vital Signs/Intake and Output


Vital Signs (last 24 hours): 


 











Temp Pulse Resp BP Pulse Ox


 


 97.2 F L  72   19   113/72   97 


 


 06/14/16 00:00  06/14/16 00:00  06/14/16 00:00  06/14/16 00:00  06/14/16 00:00











- Medications


Medications: 


 Current Medications





Aspirin (Ecotrin)  81 mg PO DAILY [facility]


   Last Admin: 06/13/16 08:39 Dose:  81 mg


Divalproex Sodium (Depakote Dr(*Bid*))  500 mg PO BID [facility]


   Last Admin: 06/13/16 16:37 Dose:  500 mg


Enalapril Maleate (Vasotec)  10 mg PO DAILY [facility]


   Last Admin: 06/13/16 08:40 Dose:  10 mg


Famotidine (Pepcid)  20 mg PO BID [facility]


   Last Admin: 06/13/16 16:37 Dose:  20 mg


Haloperidol Lactate (Haldol)  5 mg IM Q6 PRN


   PRN Reason: Agitation


   Last Admin: 05/23/16 13:23 Dose:  5 mg


Ibuprofen (Motrin Tab)  600 mg PO Q8 PRN


   PRN Reason: Pain, moderate (4-7)


   Last Admin: 06/11/16 16:53 Dose:  600 mg


Lorazepam (Ativan)  2 mg IM Q6 PRN


   PRN Reason: Agitation


Metoprolol Tartrate (Lopressor)  50 mg PO Q12 [facility]


   Last Admin: 06/13/16 21:14 Dose:  50 mg


Nicotine (Nicoderm Cq)  1 patch TD DAILY [facility]


   Last Admin: 06/13/16 08:39 Dose:  1 patch


Quetiapine Fumarate (Seroquel)  25 mg PO HS [facility]


   Last Admin: 06/13/16 21:14 Dose:  25 mg











- Labs


Labs: 


 





 05/31/16 06:15 





 05/31/16 06:15 





 











PT  11.2 SECONDS (9.4-12.0)   12/14/15  05:20    


 


INR  1.05  (0.89-1.20)   12/14/15  05:20    


 


APTT  36.2 SECONDS (23.1-32.0)  H  12/14/15  05:20    














Assessment and Plan


(1) DVT prophylaxis


Status: Acute





(2) CAD (coronary artery disease)


Status: Acute





(3) Smoking


Status: Chronic





(4) Low back pain


Status: Chronic





(5) Agitation


Status: Acute





(6) Scrotal edema


Status: Chronic





(7) Prediabetes


Status: Acute

## 2016-06-14 NOTE — PN
DATE: 06/14/2016



The patient evaluated in bed.  Only complaint is some mild low back pain which patient has been compl
aining of for several months, has had relief from ibuprofen.  No fever, chills, nausea, vomiting, john
rrhea.  Calm and cooperative.  Pending court date is 6/30.



REVIEW OF SYSTEMS:  Negative except for mild low back pain.



PHYSICAL EXAMINATION:

GENERAL:  Alert and oriented to the patient's baseline.

HEART:  Regular rate and rhythm.  No murmurs, rubs or gallops.

LUNGS:  Clear in all fields was bilaterally.

ABDOMEN:  Soft, nontender.  Bowel sounds x 4.

EXTREMITIES:  Distal pulses, motor sensation intact.  Cap refill is brisk.



DIAGNOSES AND PLAN:  Coronary artery disease, status post cardiac arrest.  Continue all medications. 
 Agitation-related neurocognitive disorder.  Continue Seroquel, Depakote.  Ativan and Haldol are orde
red as needed.  Smoking cessation, Nicoderm.  Low back pain, ibuprofen and out of bed and will contin
ue to monitor.  Deep vein thrombosis prophylaxis, sequential compression devices and AE hose, ambulat
ion.





__________________________________________

Fan SILVER







cc:



DD: 06/14/2016 12:02:54  1505

TT: 06/14/2016 12:22:44

Confirmation # [id number]

Dictation # [id number]

tn

## 2016-06-15 RX ADMIN — DIVALPROEX SODIUM SCH MG: 500 TABLET, DELAYED RELEASE ORAL at 08:01

## 2016-06-15 RX ADMIN — DIVALPROEX SODIUM SCH MG: 500 TABLET, DELAYED RELEASE ORAL at 16:24

## 2016-06-15 NOTE — CP.PCM.PN
Subjective





- Date & Time of Evaluation


Date of Evaluation: 06/15/16


Time of Evaluation: 07:16





- Subjective


Subjective: 


pt comfortable in bed, no distress, no f/c, n/v/d


calm and cooperative


ros negative, epndgin court date





Objective





- Vital Signs/Intake and Output


Vital Signs (last 24 hours): 


 











Temp Pulse Resp BP Pulse Ox


 


 97.3 F L  67   19   110/67   99 


 


 06/15/16 00:00  06/15/16 00:00  06/15/16 00:00  06/15/16 00:00  06/15/16 00:00











- Medications


Medications: 


 Current Medications





Aspirin (Ecotrin)  81 mg PO DAILY [facility]


   Last Admin: 06/14/16 08:10 Dose:  81 mg


Divalproex Sodium (Depakote Dr(*Bid*))  500 mg PO BID [facility]


   Last Admin: 06/14/16 16:25 Dose:  500 mg


Enalapril Maleate (Vasotec)  10 mg PO DAILY [facility]


   Last Admin: 06/14/16 08:12 Dose:  10 mg


Famotidine (Pepcid)  20 mg PO BID [facility]


   Last Admin: 06/14/16 16:25 Dose:  20 mg


Haloperidol Lactate (Haldol)  5 mg IM Q6 PRN


   PRN Reason: Agitation


   Last Admin: 05/23/16 13:23 Dose:  5 mg


Ibuprofen (Motrin Tab)  600 mg PO Q8 PRN


   PRN Reason: Pain, moderate (4-7)


   Last Admin: 06/14/16 08:12 Dose:  600 mg


Lorazepam (Ativan)  2 mg IM Q6 PRN


   PRN Reason: Agitation


Metoprolol Tartrate (Lopressor)  50 mg PO Q12 [facility]


   Last Admin: 06/14/16 21:25 Dose:  50 mg


Nicotine (Nicoderm Cq)  1 patch TD DAILY [facility]


   Last Admin: 06/14/16 13:40 Dose:  1 patch


Quetiapine Fumarate (Seroquel)  25 mg PO HS [facility]


   Last Admin: 06/14/16 21:25 Dose:  25 mg











- Labs


Labs: 


 





 05/31/16 06:15 





 05/31/16 06:15 





 











PT  11.2 SECONDS (9.4-12.0)   12/14/15  05:20    


 


INR  1.05  (0.89-1.20)   12/14/15  05:20    


 


APTT  36.2 SECONDS (23.1-32.0)  H  12/14/15  05:20    














- Constitutional


Appears: Well, Non-toxic, No Acute Distress





- Head Exam


Head Exam: ATRAUMATIC, NORMAL INSPECTION, NORMOCEPHALIC





- Eye Exam


Eye Exam: EOMI, Normal appearance, PERRL


Pupil Exam: NORMAL ACCOMODATION, PERRL





- ENT Exam


ENT Exam: Mucous Membranes Moist, Normal Exam





- Neck Exam


Neck Exam: Full ROM, Normal Inspection.  absent: Lymphadenopathy





- Respiratory Exam


Respiratory Exam: Clear to Ausculation Bilateral, NORMAL BREATHING PATTERN





- Cardiovascular Exam


Cardiovascular Exam: REGULAR RHYTHM, +S1, +S2.  absent: Murmur





- GI/Abdominal Exam


GI & Abdominal Exam: Soft, Normal Bowel Sounds.  absent: Tenderness





- Extremities Exam


Extremities Exam: Full ROM, Normal Capillary Refill, Normal Inspection.  absent

: Joint Swelling, Pedal Edema





- Back Exam


Back Exam: NORMAL INSPECTION





- Neurological Exam


Neurological Exam: Alert, Awake, CN II-XII Intact, Normal Gait, Oriented x3





- Psychiatric Exam


Psychiatric exam: Normal Affect, Normal Mood





- Skin


Skin Exam: Dry, Intact, Normal Color, Warm





Assessment and Plan


(1) DVT prophylaxis


Assessment & Plan: 


scd and aehose, ambulation


Status: Acute





(2) CAD (coronary artery disease)


Assessment & Plan: 


cont meds


Status: Acute





(3) Smoking


Assessment & Plan: 


nicoderm


Status: Chronic





(4) Low back pain


Assessment & Plan: 


ibuprofen prn


Status: Chronic





(5) Agitation


Assessment & Plan: 


seroqeul.dapakote


ativan/haldol prn


Status: Acute





(6) Scrotal edema


Status: Chronic





(7) Prediabetes


Assessment & Plan: 


diet control


Status: Acute

## 2016-06-16 RX ADMIN — DIVALPROEX SODIUM SCH MG: 500 TABLET, DELAYED RELEASE ORAL at 08:05

## 2016-06-16 RX ADMIN — DIVALPROEX SODIUM SCH MG: 500 TABLET, DELAYED RELEASE ORAL at 16:01

## 2016-06-16 NOTE — PN
DATE: 06/16/2016



The patient remains as assessed.  No distress, calm and cooperative.  Pending court date, 6/30/2016 a
nd he still requires guardianship for decisions.  No fever, chills, nausea, vomiting, diarrhea.  No c
omplaints are offered.  Calm and cooperative at this time as stated.



REVIEW OF SYSTEMS:  Negative.



OBJECTIVE FINDINGS:

HEART:  Regular rate and rhythm.  No murmurs, rubs or gallops.

GENERAL:  The patient is alert and oriented to baseline.

ABDOMEN:  Soft, nontender.  Bowel sounds x 4.

EXTREMITIES:  Distal pulses, motor sensation intact.  Cap refill brisk.



DIAGNOSES AND PLAN:

1.  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  Seroquel and Depakote.  Ativan and Haldol are as n
eeded.

2.  Coronary artery disease, status post cardiac arrest.  Continue all medications.  Nicoderm patch f
or smoking cessation, ibuprofen is for intermittent back pain.

3.  Deep venous thrombosis prophylaxis.  Sequential compression devices, antiembolism hose and ambula
tion.

4.  We will discharge when patient has a guardian and a safe disposition has been established.





__________________________________________

Fan SILVER







cc:



DD: 06/16/2016 08:59:26  1505

TT: 06/16/2016 09:15:31

Confirmation # [id number]

Dictation # [id number]

en

## 2016-06-16 NOTE — CP.PCM.PN
Subjective





- Date & Time of Evaluation


Date of Evaluation: 06/16/16


Time of Evaluation: 07:00





- Subjective


Subjective: 


dictation # [id number] 


calm and coopertaive


pending court date 6/30/16


still requires guardian for decision. 





Objective





- Vital Signs/Intake and Output


Vital Signs (last 24 hours): 


 











Temp Pulse Resp BP Pulse Ox


 


 97.0 F L  79   18   92/56 L  97 


 


 06/16/16 00:16  06/16/16 00:16  06/16/16 00:16  06/16/16 00:16  06/16/16 00:16











- Medications


Medications: 


 Current Medications





Aspirin (Ecotrin)  81 mg PO DAILY [facility]


   Last Admin: 06/15/16 08:03 Dose:  81 mg


Divalproex Sodium (Depakote Dr(*Bid*))  500 mg PO BID [facility]


   Last Admin: 06/15/16 16:24 Dose:  500 mg


Enalapril Maleate (Vasotec)  10 mg PO DAILY [facility]


   Last Admin: 06/15/16 08:05 Dose:  10 mg


Famotidine (Pepcid)  20 mg PO BID [facility]


   Last Admin: 06/15/16 16:24 Dose:  20 mg


Haloperidol Lactate (Haldol)  5 mg IM Q6 PRN


   PRN Reason: Agitation


   Last Admin: 05/23/16 13:23 Dose:  5 mg


Ibuprofen (Motrin Tab)  600 mg PO Q8 PRN


   PRN Reason: Pain, moderate (4-7)


   Last Admin: 06/14/16 08:12 Dose:  600 mg


Lorazepam (Ativan)  2 mg IM Q6 PRN


   PRN Reason: Agitation


Metoprolol Tartrate (Lopressor)  50 mg PO Q12 [facility]


   Last Admin: 06/15/16 21:15 Dose:  50 mg


Nicotine (Nicoderm Cq)  1 patch TD DAILY [facility]


   Last Admin: 06/15/16 11:34 Dose:  1 patch


Quetiapine Fumarate (Seroquel)  25 mg PO HS [facility]


   Last Admin: 06/15/16 21:15 Dose:  25 mg











- Labs


Labs: 


 





 05/31/16 06:15 





 05/31/16 06:15 





 











PT  11.2 SECONDS (9.4-12.0)   12/14/15  05:20    


 


INR  1.05  (0.89-1.20)   12/14/15  05:20    


 


APTT  36.2 SECONDS (23.1-32.0)  H  12/14/15  05:20    














Assessment and Plan


(1) DVT prophylaxis


Status: Acute





(2) CAD (coronary artery disease)


Status: Acute





(3) Smoking


Status: Chronic





(4) Low back pain


Status: Chronic





(5) Agitation


Status: Acute





(6) Scrotal edema


Status: Chronic





(7) Prediabetes


Status: Acute

## 2016-06-17 RX ADMIN — DIVALPROEX SODIUM SCH MG: 500 TABLET, DELAYED RELEASE ORAL at 08:26

## 2016-06-17 RX ADMIN — DIVALPROEX SODIUM SCH MG: 500 TABLET, DELAYED RELEASE ORAL at 16:12

## 2016-06-17 NOTE — PN
DATE: 06/17/2016



The patient remains as assessed, no complaints, no distress.  No fever, chills, nausea, vomiting, john
rrhea.



REVIEW OF SYSTEMS:  Negative.



OBJECTIVE FINDINGS:

GENERAL:  Alert and oriented at baseline.

HEART:  Regular rate and rhythm.  No murmurs, rubs or gallops.

ABDOMEN:  Soft, nontender, bowel sounds x 4.

EXTREMITIES:  Distal pulses and motor sensation intact.  Cap refill is brisk.



DIAGNOSES AND PLAN:  Coronary artery disease, status post cardiac arrest.  Continue medications.  Beatris
tation related to neurocognitive disorder.  Seroquel and Depakote.  Ativan and Haldol p.r.n.  Deep ve
nous thrombosis prophylaxis.  Sequential compression devices and antiembolism hose.  Smoking cessatio
n.  Continue Nicoderm patch.  Low back pain.  Ibuprofen p.r.n.  We will be discharged after court nina
e 6/30/2016 when safe.





__________________________________________

Fan SILVER







cc:



DD: 06/17/2016 09:12:12  1505

TT: 06/17/2016 09:42:04

Confirmation # [id number]

Dictation # [id number]

en

## 2016-06-18 RX ADMIN — DIVALPROEX SODIUM SCH MG: 500 TABLET, DELAYED RELEASE ORAL at 16:11

## 2016-06-18 RX ADMIN — DIVALPROEX SODIUM SCH MG: 500 TABLET, DELAYED RELEASE ORAL at 08:57

## 2016-06-18 NOTE — CP.PCM.PN
Subjective





- Date & Time of Evaluation


Date of Evaluation: 06/18/16


Time of Evaluation: 08:30





- Subjective


Subjective: 


consult dicated


pendign guardian


court 6/30/16





Objective





- Vital Signs/Intake and Output


Vital Signs (last 24 hours): 


 











Temp Pulse Resp BP Pulse Ox


 


 97.3 F L  75   20   136/66   99 


 


 06/18/16 08:49  06/18/16 08:56  06/18/16 08:49  06/18/16 08:56  06/18/16 08:49











- Medications


Medications: 


 Current Medications





Aspirin (Ecotrin)  81 mg PO DAILY [facility]


   Last Admin: 06/18/16 08:57 Dose:  81 mg


Divalproex Sodium (Depakote Dr(*Bid*))  500 mg PO BID [facility]


   Last Admin: 06/18/16 08:57 Dose:  500 mg


Enalapril Maleate (Vasotec)  10 mg PO DAILY [facility]


   Last Admin: 06/18/16 08:57 Dose:  10 mg


Famotidine (Pepcid)  20 mg PO BID [facility]


   Last Admin: 06/18/16 08:56 Dose:  20 mg


Haloperidol Lactate (Haldol)  5 mg IM Q6 PRN


   PRN Reason: Agitation


   Last Admin: 05/23/16 13:23 Dose:  5 mg


Ibuprofen (Motrin Tab)  600 mg PO Q8 PRN


   PRN Reason: Pain, moderate (4-7)


   Last Admin: 06/14/16 08:12 Dose:  600 mg


Lorazepam (Ativan)  2 mg IM Q6 PRN


   PRN Reason: Agitation


Metoprolol Tartrate (Lopressor)  50 mg PO Q12 [facility]


   Last Admin: 06/18/16 08:56 Dose:  50 mg


Nicotine (Nicoderm Cq)  1 patch TD DAILY [facility]


   Last Admin: 06/18/16 08:57 Dose:  1 patch


Quetiapine Fumarate (Seroquel)  25 mg PO HS [facility]


   Last Admin: 06/17/16 21:02 Dose:  25 mg











- Labs


Labs: 


 





 05/31/16 06:15 





 05/31/16 06:15 





 











PT  11.2 SECONDS (9.4-12.0)   12/14/15  05:20    


 


INR  1.05  (0.89-1.20)   12/14/15  05:20    


 


APTT  36.2 SECONDS (23.1-32.0)  H  12/14/15  05:20    














Assessment and Plan


(1) DVT prophylaxis


Status: Acute





(2) CAD (coronary artery disease)


Status: Acute





(3) Smoking


Status: Chronic





(4) Low back pain


Status: Chronic





(5) Agitation


Status: Acute





(6) Scrotal edema


Status: Chronic





(7) Prediabetes


Status: Acute

## 2016-06-19 RX ADMIN — DIVALPROEX SODIUM SCH MG: 500 TABLET, DELAYED RELEASE ORAL at 08:05

## 2016-06-19 RX ADMIN — DIVALPROEX SODIUM SCH MG: 500 TABLET, DELAYED RELEASE ORAL at 16:05

## 2016-06-19 NOTE — PN
DATE: 06/19/2016



The patient evaluated in bed.  No distress.  No complaints of fever, chills, nausea, vomiting, diarrh
ea.  The patient is calm and cooperative pending court date 6/30/2016.  The patient still requires gu
ardian as he is unable to care for self.



REVIEW OF SYSTEMS:  Negative.



PHYSICAL EXAMINATION:

GENERAL:  The patient alert and oriented to baseline.

HEART:  Regular rate and rhythm.  No murmurs, rubs or gallops.

ABDOMEN:  Soft, nontender.  Bowel sounds x 4.

EXTREMITIES:  Distal pulses, motor sensation intact.  Cap refill is brisk.



DIAGNOSES AND PLAN:  Coronary artery disease, status post cardiac arrest.  Continue all medications. 
 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  Seroquel and Depakote, Ativan and Haldol p.r.n. agita
tion.  Nicoderm for smoking cessation, ibuprofen p.r.n. for low back pain.  Deep venous thrombosis pr
ophylaxis.  Sequential compression devices and antiembolism hose.  We will continue to monitor patien
t until such time as a guardian is named for the patient and safe disposition is established.





__________________________________________

Fan SILVER







cc:



DD: 06/19/2016 09:40:57  1505

TT: 06/19/2016 09:53:11

Confirmation # [id number]

Dictation # [id number]

en

## 2016-06-19 NOTE — CP.PCM.PN
Subjective





- Date & Time of Evaluation


Date of Evaluation: 06/19/16


Time of Evaluation: 08:03





- Subjective


Subjective: 


calm and cooperative


pending court date


consult dictated





Objective





- Vital Signs/Intake and Output


Vital Signs (last 24 hours): 


 











Temp Pulse Resp BP Pulse Ox


 


 97.3 F L  62   20   108/67   97 


 


 06/19/16 07:57  06/19/16 07:57  06/19/16 07:57  06/19/16 07:57  06/19/16 07:57











- Medications


Medications: 


 Current Medications





Aspirin (Ecotrin)  81 mg PO DAILY [facility]


   Last Admin: 06/18/16 08:57 Dose:  81 mg


Divalproex Sodium (Depakote Dr(*Bid*))  500 mg PO BID [facility]


   Last Admin: 06/18/16 16:11 Dose:  500 mg


Enalapril Maleate (Vasotec)  10 mg PO DAILY [facility]


   Last Admin: 06/18/16 08:57 Dose:  10 mg


Famotidine (Pepcid)  20 mg PO BID [facility]


   Last Admin: 06/18/16 16:11 Dose:  20 mg


Haloperidol Lactate (Haldol)  5 mg IM Q6 PRN


   PRN Reason: Agitation


   Last Admin: 05/23/16 13:23 Dose:  5 mg


Ibuprofen (Motrin Tab)  600 mg PO Q8 PRN


   PRN Reason: Pain, moderate (4-7)


   Last Admin: 06/14/16 08:12 Dose:  600 mg


Lorazepam (Ativan)  2 mg IM Q6 PRN


   PRN Reason: Agitation


Metoprolol Tartrate (Lopressor)  50 mg PO Q12 [facility]


   Last Admin: 06/18/16 21:28 Dose:  50 mg


Nicotine (Nicoderm Cq)  1 patch TD DAILY [facility]


   Last Admin: 06/18/16 08:57 Dose:  1 patch


Quetiapine Fumarate (Seroquel)  25 mg PO HS [facility]


   Last Admin: 06/18/16 21:28 Dose:  25 mg











- Labs


Labs: 


 





 05/31/16 06:15 





 05/31/16 06:15 





 











PT  11.2 SECONDS (9.4-12.0)   12/14/15  05:20    


 


INR  1.05  (0.89-1.20)   12/14/15  05:20    


 


APTT  36.2 SECONDS (23.1-32.0)  H  12/14/15  05:20    














Assessment and Plan


(1) DVT prophylaxis


Status: Acute





(2) CAD (coronary artery disease)


Status: Acute





(3) Smoking


Status: Chronic





(4) Low back pain


Status: Chronic





(5) Agitation


Status: Acute





(6) Scrotal edema


Status: Chronic





(7) Prediabetes


Status: Acute

## 2016-06-20 RX ADMIN — DIVALPROEX SODIUM SCH MG: 500 TABLET, DELAYED RELEASE ORAL at 09:12

## 2016-06-20 RX ADMIN — DIVALPROEX SODIUM SCH MG: 500 TABLET, DELAYED RELEASE ORAL at 16:08

## 2016-06-20 NOTE — PN
DATE: 06/20/2016



The patient evaluated in bed.  In no distress.  No complaints.  No fever, chills, nausea, vomiting, d
iarrhea.  The patient is calm, cooperative. Remains pending court date.  Remains needing a guardian t
o assist in care.



REVIEW OF SYSTEMS:  Negative.



PHYSICAL EXAMINATION:  

GENERAL:  The patient is alert and oriented to baseline.  

HEART:  Regular rate and rhythm.  No murmurs, rubs or gallops.

ABDOMEN:  Soft, nontender.  Bowel sounds x 4.

EXTREMITIES:  Distal pulses, motor and sensation intact.  Cap refill is brisk.



DIAGNOSES AND PLAN:  

1.  Coronary artery disease, status post cardiac arrest.  Continue medication.

2.  Agitation-related neurocognitive disorder.  Continue Seroquel and Depakote.  Continue Ativan and 
Haldol p.r.n.  

3.  Deep venous thrombosis prophylaxis.  SCD and AE hose.

4.  Prediabetes.  Diet and exercise.  Will continue to monitor.

5.  Smoking cessation.  Nicoderm patch.

6.  Low back pain intermittently.  Ibuprofen p.r.n. 



Will continue to follow until guardian is establish and safe discharge plan is established as well.





__________________________________________

Fan SILVER







cc:



DD: 06/20/2016 09:35:23  1505

TT: 06/20/2016 09:59:52

Confirmation # [id number]

Dictation # [id number]

mn

## 2016-06-20 NOTE — CP.PCM.PN
Subjective





- Date & Time of Evaluation


Date of Evaluation: 06/20/16


Time of Evaluation: 07:20





- Subjective


Subjective: 


consult dictated


no complaints


pending court date





Objective





- Vital Signs/Intake and Output


Vital Signs (last 24 hours): 


 











Temp Pulse Resp BP Pulse Ox


 


 98.1 F   70   19   129/72   99 


 


 06/20/16 00:00  06/20/16 00:00  06/20/16 00:00  06/20/16 00:00  06/20/16 00:00











- Medications


Medications: 


 Current Medications





Aspirin (Ecotrin)  81 mg PO DAILY [facility]


   Last Admin: 06/19/16 08:05 Dose:  81 mg


Divalproex Sodium (Depakote Dr(*Bid*))  500 mg PO BID [facility]


   Last Admin: 06/19/16 16:05 Dose:  500 mg


Enalapril Maleate (Vasotec)  10 mg PO DAILY [facility]


   Last Admin: 06/19/16 08:06 Dose:  10 mg


Famotidine (Pepcid)  20 mg PO BID [facility]


   Last Admin: 06/19/16 16:05 Dose:  20 mg


Haloperidol Lactate (Haldol)  5 mg IM Q6 PRN


   PRN Reason: Agitation


   Last Admin: 05/23/16 13:23 Dose:  5 mg


Ibuprofen (Motrin Tab)  600 mg PO Q8 PRN


   PRN Reason: Pain, moderate (4-7)


   Last Admin: 06/14/16 08:12 Dose:  600 mg


Lorazepam (Ativan)  2 mg IM Q6 PRN


   PRN Reason: Agitation


Metoprolol Tartrate (Lopressor)  50 mg PO Q12 [facility]


   Last Admin: 06/19/16 21:21 Dose:  50 mg


Nicotine (Nicoderm Cq)  1 patch TD DAILY [facility]


   Last Admin: 06/19/16 08:05 Dose:  1 patch


Quetiapine Fumarate (Seroquel)  25 mg PO HS [facility]


   Last Admin: 06/19/16 21:21 Dose:  25 mg











- Labs


Labs: 


 





 05/31/16 06:15 





 05/31/16 06:15 





 











PT  11.2 SECONDS (9.4-12.0)   12/14/15  05:20    


 


INR  1.05  (0.89-1.20)   12/14/15  05:20    


 


APTT  36.2 SECONDS (23.1-32.0)  H  12/14/15  05:20    














Assessment and Plan


(1) DVT prophylaxis


Status: Acute





(2) CAD (coronary artery disease)


Status: Acute





(3) Smoking


Status: Chronic





(4) Low back pain


Status: Chronic





(5) Agitation


Status: Acute





(6) Scrotal edema


Status: Chronic





(7) Prediabetes


Status: Acute

## 2016-06-21 RX ADMIN — DIVALPROEX SODIUM SCH MG: 500 TABLET, DELAYED RELEASE ORAL at 16:18

## 2016-06-21 RX ADMIN — DIVALPROEX SODIUM SCH MG: 500 TABLET, DELAYED RELEASE ORAL at 09:04

## 2016-06-21 NOTE — PN
DATE: 06/21/2016



The patient remains as assessed in bed.  No complaints.  No fever, chills, nausea, vomiting or diarrh
ea.  No distress.  Calm and cooperative.  The patient remains pending court date 06/30/2016 to determ
ine guardianship.  The patient still requires a guardian as he is unfit to care for himself.  



REVIEW OF SYSTEMS:  Negative.



OBJECTIVE FINDINGS:  

GENERAL:  The patient is alert and oriented to baseline.

HEART:  Regular rate and rhythm.  No murmurs, rubs or gallops.

ABDOMEN:  Soft, nontender.  Bowel sounds x 4.

LUNGS:  Clear in all fields bilaterally.

EXTREMITIES:  Distal pulses, motor and sensation intact.  Cap refill is brisk.



DIAGNOSES AND PLAN:  

1.  Coronary artery disease, status post cardiac arrest.  Continue medicines.

2.  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  Seroquel, Depakote, Ativan and Haldol p.r.n.

3.  Smoking cessation.  Nicoderm patch.

4.  Low back pain intermittently, none at present.  Ibuprofen p.r.n.  

5.  Deep venous thrombosis prophylaxis.  SCD and AE hose.  Ambulation.  

6.  Will discharge when guardianship is established and safe discharge of patient is also established
.





__________________________________________

Fan SILVER







cc:



DD: 06/21/2016 07:17:19  [id number]

TT: 06/21/2016 08:20:09

Confirmation # [id number]

Dictation # [id number]

mn

## 2016-06-21 NOTE — CP.PCM.PN
Subjective





- Date & Time of Evaluation


Date of Evaluation: 06/21/16


Time of Evaluation: 06:31





- Subjective


Subjective: 


consult dictated


for trach/peg


nsr on monitor


remsins intubated





Objective





- Vital Signs/Intake and Output


Vital Signs (last 24 hours): 


 











Temp Pulse Resp BP Pulse Ox


 


 98.1 F   66   19   107/66   100 


 


 06/21/16 00:00  06/21/16 00:00  06/21/16 00:00  06/21/16 00:00  06/21/16 00:00











- Medications


Medications: 


 Current Medications





Aspirin (Ecotrin)  81 mg PO DAILY [facility]


   Last Admin: 06/20/16 09:13 Dose:  81 mg


Divalproex Sodium (Depakote Dr(*Bid*))  500 mg PO BID [facility]


   Last Admin: 06/20/16 16:08 Dose:  500 mg


Enalapril Maleate (Vasotec)  10 mg PO DAILY [facility]


   Last Admin: 06/20/16 09:19 Dose:  10 mg


Famotidine (Pepcid)  20 mg PO BID [facility]


   Last Admin: 06/20/16 16:08 Dose:  20 mg


Haloperidol Lactate (Haldol)  5 mg IM Q6 PRN


   PRN Reason: Agitation


   Last Admin: 05/23/16 13:23 Dose:  5 mg


Ibuprofen (Motrin Tab)  600 mg PO Q8 PRN


   PRN Reason: Pain, moderate (4-7)


   Last Admin: 06/14/16 08:12 Dose:  600 mg


Lorazepam (Ativan)  2 mg IM Q6 PRN


   PRN Reason: Agitation


Metoprolol Tartrate (Lopressor)  50 mg PO Q12 [facility]


   Last Admin: 06/20/16 21:42 Dose:  50 mg


Nicotine (Nicoderm Cq)  1 patch TD DAILY [facility]


   Last Admin: 06/20/16 09:12 Dose:  1 patch


Quetiapine Fumarate (Seroquel)  25 mg PO HS [facility]


   Last Admin: 06/20/16 21:42 Dose:  25 mg











- Labs


Labs: 


 





 05/31/16 06:15 





 05/31/16 06:15 





 











PT  11.2 SECONDS (9.4-12.0)   12/14/15  05:20    


 


INR  1.05  (0.89-1.20)   12/14/15  05:20    


 


APTT  36.2 SECONDS (23.1-32.0)  H  12/14/15  05:20    














Assessment and Plan


(1) DVT prophylaxis


Status: Acute





(2) CAD (coronary artery disease)


Status: Acute





(3) Smoking


Status: Chronic





(4) Low back pain


Status: Chronic





(5) Agitation


Status: Acute





(6) Scrotal edema


Status: Chronic





(7) Prediabetes


Status: Acute

## 2016-06-22 RX ADMIN — DIVALPROEX SODIUM SCH MG: 500 TABLET, DELAYED RELEASE ORAL at 16:20

## 2016-06-22 RX ADMIN — DIVALPROEX SODIUM SCH MG: 500 TABLET, DELAYED RELEASE ORAL at 08:18

## 2016-06-22 NOTE — CP.PCM.PN
Subjective





- Date & Time of Evaluation


Date of Evaluation: 06/22/16


Time of Evaluation: 10:31





- Subjective


Subjective: 


Patient is lying in bed, sleeping  No complaints. Smiles when awoken.  Denies 

any pain or SOB





Objective





- Vital Signs/Intake and Output


Vital Signs (last 24 hours): 


 











Temp Pulse Resp BP Pulse Ox


 


 97.3 F L  71   20   105/63   98 


 


 06/22/16 08:00  06/22/16 08:00  06/22/16 08:00  06/22/16 08:00  06/22/16 08:00











- Medications


Medications: 


 Current Medications





Aspirin (Ecotrin)  81 mg PO DAILY [facility]


   Last Admin: 06/22/16 08:18 Dose:  81 mg


Divalproex Sodium (Depakote Dr(*Bid*))  500 mg PO BID [facility]


   Last Admin: 06/22/16 08:18 Dose:  500 mg


Enalapril Maleate (Vasotec)  10 mg PO DAILY [facility]


   Last Admin: 06/22/16 08:19 Dose:  10 mg


Famotidine (Pepcid)  20 mg PO BID [facility]


   Last Admin: 06/22/16 08:19 Dose:  20 mg


Haloperidol Lactate (Haldol)  5 mg IM Q6 PRN


   PRN Reason: Agitation


   Last Admin: 05/23/16 13:23 Dose:  5 mg


Ibuprofen (Motrin Tab)  600 mg PO Q8 PRN


   PRN Reason: Pain, moderate (4-7)


   Last Admin: 06/14/16 08:12 Dose:  600 mg


Lorazepam (Ativan)  2 mg IM Q6 PRN


   PRN Reason: Agitation


Metoprolol Tartrate (Lopressor)  50 mg PO Q12 [facility]


   Last Admin: 06/21/16 21:04 Dose:  50 mg


Nicotine (Nicoderm Cq)  1 patch TD DAILY [facility]


   Last Admin: 06/22/16 08:18 Dose:  1 patch


Quetiapine Fumarate (Seroquel)  25 mg PO HS [facility]


   Last Admin: 06/21/16 21:04 Dose:  25 mg











- Labs


Labs: 


 





 05/31/16 06:15 





 05/31/16 06:15 





 











PT  11.2 SECONDS (9.4-12.0)   12/14/15  05:20    


 


INR  1.05  (0.89-1.20)   12/14/15  05:20    


 


APTT  36.2 SECONDS (23.1-32.0)  H  12/14/15  05:20    














- Constitutional


Appears: Non-toxic, No Acute Distress





- Head Exam


Head Exam: ATRAUMATIC, NORMAL INSPECTION, NORMOCEPHALIC





- Respiratory Exam


Respiratory Exam: Clear to Ausculation Bilateral, NORMAL BREATHING PATTERN.  

absent: Rales, Rhonchi, Wheezes, Respiratory Distress





- Cardiovascular Exam


Cardiovascular Exam: REGULAR RHYTHM, RRR, +S1, +S2





- GI/Abdominal Exam


GI & Abdominal Exam: Soft, Normal Bowel Sounds





- Extremities Exam


Extremities Exam: Full ROM, Normal Capillary Refill





Assessment and Plan


(1) CAD (coronary artery disease)


Status: Acute





(2) Cardiac arrhythmia


Status: Acute





(3) HTN (hypertension)


Status: Acute





(4) DVT prophylaxis


Status: Acute








- Assessment and Plan (Free Text)


Assessment: 


56 y M homeless, brought in after having a cardiac arrest in [place].  Patient 

is medically stable but has severe cognitive impairment with moments of 

agitation which makes his discharge unsafe. Awaiting guardianship.





1. Cognitive Impairment with agitation - currently patient is cooperative and 

calm


- on Seroquel, Depakote and Ativan 


- Psychiatry consulted with follow up last on 4/29


- patient awaiting guardianship








2. Cardiac Arrest


- doing well, s/p extensive ICU admission


- s/p cardiology input





3.  HTN


- controlled with Metoprolol and Enalapril





4. Tobacco Abuse


- on Nicotine patch





5. CAD 


- on ASA, BB, ACEi


- add statin


- as noted per cardiology to have had a NSTEMI (with echo findings and elevated 

trop) as possible etiology of Cardiac arrest


- Echo - 12/2015 - LVEF~40% , systolic function is moderately impaired mild 

septal hypokinesis, borderline LV concentric hypertrophy





6. Hx of Seizure


- cont Keppra





7. DVT prophylaxis 


- ambulating in unit


- SCDs prn

## 2016-06-23 LAB
BUN SERPL-MCNC: 20 MG/DL (ref 9–20)
CALCIUM SERPL-MCNC: 9.4 MG/DL (ref 8.4–10.2)
ERYTHROCYTE [DISTWIDTH] IN BLOOD BY AUTOMATED COUNT: 13.2 % (ref 11.5–14.5)
GFR NON-AFRICAN AMERICAN: > 60
HGB BLD-MCNC: 12.7 G/DL (ref 12–18)
MCH RBC QN AUTO: 29.1 PG (ref 27–31)
MCHC RBC AUTO-ENTMCNC: 32.2 G/DL (ref 33–37)
MCV RBC AUTO: 90.3 FL (ref 80–94)
PLATELET # BLD: 204 K/UL (ref 130–400)
RBC # BLD AUTO: 4.35 MIL/UL (ref 4.4–5.9)
WBC # BLD AUTO: 6.8 K/UL (ref 4.8–10.8)

## 2016-06-23 RX ADMIN — DIVALPROEX SODIUM SCH MG: 500 TABLET, DELAYED RELEASE ORAL at 17:10

## 2016-06-23 RX ADMIN — DIVALPROEX SODIUM SCH MG: 500 TABLET, DELAYED RELEASE ORAL at 09:54

## 2016-06-23 NOTE — PN
DATE: 06/23/2016



SUBJECTIVE:  The patient is evaluated in bed, in no distress.  No complaints.  No fever, chills, naus
ea, vomiting, diarrhea.  The patient remains pending court date, 6/30/16 for guardianship and still r
emains requiring guardians to help with care.



REVIEW OF SYSTEMS:  Negative.



PHYSICAL EXAMINATION:

GENERAL:  The patient is alert and oriented to the patient's baseline.

HEART:  Regular rate and rhythm.  No murmurs, rubs or gallops.

ABDOMEN:  Soft, nontender.  Bowel sounds x 4.

LUNGS:  Clear to auscultation bilaterally.

NEUROLOGIC:  Distal pulses, motor sensation intact.  Cap refill is brisk.



DIAGNOSES AND PLAN:  Coronary artery disease, status post cardiac arrest.  Continue all medications. 
 Agitation, related to neurocognitive disorder.  Continue Seroquel, Depakote, Ativan and Haldol as ne
eded.  Nicoderm for smoking cessation and ibuprofen p.r.n. for low back pain, which is intermittent. 
 Deep venous thrombosis prophylaxis, sequential compression devices and AE hose.  We will discharge t
he patient when the patient has a guardian established and safe discharge arrangements are made.





__________________________________________

Fan SILVER







cc:



DD: 06/23/2016 16:00:06  1505

TT: 06/23/2016 17:59:05

Confirmation # [id number]

Dictation # [id number]

an

## 2016-06-23 NOTE — CP.PCM.PN
Subjective





- Date & Time of Evaluation


Date of Evaluation: 06/23/16


Time of Evaluation: 08:00





- Subjective


Subjective: 


consult dictated


calm/cooperative


pending court date for guardianship





Objective





- Vital Signs/Intake and Output


Vital Signs (last 24 hours): 


 











Temp Pulse Resp BP Pulse Ox


 


 97.5 F L  72   20   106/64   97 


 


 06/23/16 08:41  06/23/16 09:54  06/23/16 08:41  06/23/16 09:54  06/23/16 08:41











- Medications


Medications: 


 Current Medications





Aspirin (Ecotrin)  81 mg PO DAILY [facility]


   Last Admin: 06/23/16 09:54 Dose:  81 mg


Divalproex Sodium (Depakote Dr(*Bid*))  500 mg PO BID [facility]


   Last Admin: 06/23/16 09:54 Dose:  500 mg


Enalapril Maleate (Vasotec)  10 mg PO DAILY [facility]


   Last Admin: 06/23/16 09:55 Dose:  10 mg


Famotidine (Pepcid)  20 mg PO BID [facility]


   Last Admin: 06/23/16 09:54 Dose:  20 mg


Haloperidol Lactate (Haldol)  5 mg IM Q6 PRN


   PRN Reason: Agitation


   Last Admin: 05/23/16 13:23 Dose:  5 mg


Ibuprofen (Motrin Tab)  600 mg PO Q8 PRN


   PRN Reason: Pain, moderate (4-7)


   Last Admin: 06/14/16 08:12 Dose:  600 mg


Lorazepam (Ativan)  2 mg IM Q6 PRN


   PRN Reason: Agitation


Metoprolol Tartrate (Lopressor)  50 mg PO Q12 [facility]


   Last Admin: 06/23/16 09:54 Dose:  50 mg


Nicotine (Nicoderm Cq)  1 patch TD DAILY [facility]


   Last Admin: 06/23/16 09:55 Dose:  1 patch


Quetiapine Fumarate (Seroquel)  25 mg PO HS [facility]


   Last Admin: 06/22/16 21:33 Dose:  25 mg











- Labs


Labs: 


 





 06/23/16 06:40 





 06/23/16 06:40 





 











PT  11.2 SECONDS (9.4-12.0)   12/14/15  05:20    


 


INR  1.05  (0.89-1.20)   12/14/15  05:20    


 


APTT  36.2 SECONDS (23.1-32.0)  H  12/14/15  05:20    














Assessment and Plan


(1) DVT prophylaxis


Status: Acute





(2) CAD (coronary artery disease)


Status: Acute





(3) Smoking


Status: Chronic





(4) Low back pain


Status: Chronic





(5) Agitation


Status: Acute





(6) Scrotal edema


Status: Chronic





(7) Prediabetes


Status: Acute

## 2016-06-24 RX ADMIN — DIVALPROEX SODIUM SCH MG: 500 TABLET, DELAYED RELEASE ORAL at 09:40

## 2016-06-24 RX ADMIN — DIVALPROEX SODIUM SCH MG: 500 TABLET, DELAYED RELEASE ORAL at 17:00

## 2016-06-25 RX ADMIN — DIVALPROEX SODIUM SCH MG: 500 TABLET, DELAYED RELEASE ORAL at 16:28

## 2016-06-25 RX ADMIN — DIVALPROEX SODIUM SCH MG: 500 TABLET, DELAYED RELEASE ORAL at 08:40

## 2016-06-26 RX ADMIN — DIVALPROEX SODIUM SCH MG: 500 TABLET, DELAYED RELEASE ORAL at 16:35

## 2016-06-26 RX ADMIN — DIVALPROEX SODIUM SCH MG: 500 TABLET, DELAYED RELEASE ORAL at 08:07

## 2016-06-27 RX ADMIN — DIVALPROEX SODIUM SCH MG: 500 TABLET, DELAYED RELEASE ORAL at 08:12

## 2016-06-27 RX ADMIN — DIVALPROEX SODIUM SCH MG: 500 TABLET, DELAYED RELEASE ORAL at 16:35

## 2016-06-27 NOTE — CP.PCM.PN
Subjective





- Date & Time of Evaluation


Date of Evaluation: 06/27/16


Time of Evaluation: 10:39





- Subjective


Subjective: 


consult dictated


calm/cooperative


pending court date for guardianship





Objective





- Vital Signs/Intake and Output


Vital Signs (last 24 hours): 


 











Temp Pulse Resp BP Pulse Ox


 


 97.8 F   85   20   109/72   98 


 


 06/27/16 07:38  06/27/16 08:12  06/27/16 07:38  06/27/16 08:12  06/27/16 07:38











- Medications


Medications: 


 Current Medications





Aspirin (Ecotrin)  81 mg PO DAILY [facility]


   Last Admin: 06/27/16 08:11 Dose:  81 mg


Atorvastatin Calcium (Lipitor)  20 mg PO DAILY [facility]


   Last Admin: 06/27/16 08:12 Dose:  20 mg


Divalproex Sodium (Depakote Dr(*Bid*))  500 mg PO BID [facility]


   Last Admin: 06/27/16 08:12 Dose:  500 mg


Enalapril Maleate (Vasotec)  10 mg PO DAILY [facility]


   Last Admin: 06/27/16 08:13 Dose:  10 mg


Famotidine (Pepcid)  20 mg PO BID [facility]


   Last Admin: 06/27/16 08:11 Dose:  20 mg


Haloperidol Lactate (Haldol)  5 mg IM Q6 PRN


   PRN Reason: Agitation


   Last Admin: 05/23/16 13:23 Dose:  5 mg


Ibuprofen (Motrin Tab)  600 mg PO Q8 PRN


   PRN Reason: Pain, moderate (4-7)


   Last Admin: 06/14/16 08:12 Dose:  600 mg


Metoprolol Tartrate (Lopressor)  50 mg PO Q12 [facility]


   Last Admin: 06/27/16 08:12 Dose:  50 mg


Nicotine (Nicoderm Cq)  1 patch TD DAILY [facility]


   Last Admin: 06/27/16 08:12 Dose:  1 patch


Quetiapine Fumarate (Seroquel)  25 mg PO HS [facility]


   Last Admin: 06/26/16 21:21 Dose:  25 mg











- Labs


Labs: 


 





 06/23/16 06:40 





 06/23/16 06:40 





 











PT  11.2 SECONDS (9.4-12.0)   12/14/15  05:20    


 


INR  1.05  (0.89-1.20)   12/14/15  05:20    


 


APTT  36.2 SECONDS (23.1-32.0)  H  12/14/15  05:20    














Assessment and Plan


(1) DVT prophylaxis


Status: Acute





(2) CAD (coronary artery disease)


Status: Acute





(3) Smoking


Status: Chronic





(4) Low back pain


Status: Chronic





(5) Agitation


Status: Acute





(6) Scrotal edema


Status: Chronic





(7) Prediabetes


Status: Acute

## 2016-06-27 NOTE — PN
DATE: 06/27/2016



The patient remains as assessed  No complaints.  No distress.  No fever, chills
, nausea, vomiting, or diarrhea.  The patient remains pending court date 06/30/
16 for guardianship as the patient has remained unable to make decisions for 
himself.  The patient is calm and cooperative at this time.



REVIEW OF SYSTEMS:  Negative.



OBJECTIVE:

GENERAL:  The patient is alert and oriented to baseline.

HEART:  Regular rate and rhythm.  No murmur, rubs, or gallop.

LUNGS:  Clear in all fields bilaterally.

ABDOMEN:  Soft, nontender.  Bowel sounds x 4.

EXTREMITIES:  Distal pulses, motor sensation intact.  Cap refill is brisk.



DIAGNOSES AND PLAN:  Coronary artery disease, status post cardiac arrest.  
Continue medication.  The patient is related to neurocognitive disorder.  
Continue to start Depakote, Ativan and Haldol p.r.n. for agitation.  Deep 
venous thrombosis prophylaxis, SCD and AE hose with ambulation.  Smoking 
cessation with Nicoderm patch.  Intermittent back pain, ibuprofen p.r.n. We 
will discharge patient when guardianship is established and the patient has a 
safe disposition.





__________________________________________

Fan SILVER







cc:   



DD: 06/27/2016 12:29:09  1505

TT: 06/27/2016 13:19:00

Confirmation # [id number]

Dictation # [id number]

an

MTDD

## 2016-06-28 RX ADMIN — DIVALPROEX SODIUM SCH MG: 500 TABLET, DELAYED RELEASE ORAL at 10:13

## 2016-06-28 RX ADMIN — DIVALPROEX SODIUM SCH MG: 500 TABLET, DELAYED RELEASE ORAL at 18:49

## 2016-06-28 RX ADMIN — GUAIFENESIN AND DEXTROMETHORPHAN HYDROBROMIDE SCH TAB: 600; 30 TABLET, EXTENDED RELEASE ORAL at 18:48

## 2016-06-28 RX ADMIN — GUAIFENESIN AND DEXTROMETHORPHAN HYDROBROMIDE SCH TAB: 600; 30 TABLET, EXTENDED RELEASE ORAL at 11:53

## 2016-06-28 NOTE — CP.PCM.PN
Subjective





- Date & Time of Evaluation


Date of Evaluation: 06/28/16


Time of Evaluation: 08:06





- Subjective


Subjective: 


consult dictated


+ cough/congestion


congestion all lungfields


cxr, albuterol, mucinex ordered


afebrile








Objective





- Vital Signs/Intake and Output


Vital Signs (last 24 hours): 


 











Temp Pulse Resp BP Pulse Ox


 


 97.7 F   77   20   138/79   98 


 


 06/27/16 16:32  06/27/16 21:24  06/27/16 16:32  06/27/16 21:24  06/27/16 16:32











- Medications


Medications: 


 Current Medications





Aspirin (Ecotrin)  81 mg PO DAILY [facility]


   Last Admin: 06/27/16 08:11 Dose:  81 mg


Atorvastatin Calcium (Lipitor)  20 mg PO DAILY [facility]


   Last Admin: 06/27/16 08:12 Dose:  20 mg


Divalproex Sodium (Depakote Dr(*Bid*))  500 mg PO BID [facility]


   Last Admin: 06/27/16 16:35 Dose:  500 mg


Enalapril Maleate (Vasotec)  10 mg PO DAILY [facility]


   Last Admin: 06/27/16 08:13 Dose:  10 mg


Famotidine (Pepcid)  20 mg PO BID [facility]


   Last Admin: 06/27/16 16:35 Dose:  20 mg


Haloperidol Lactate (Haldol)  5 mg IM Q6 PRN


   PRN Reason: Agitation


   Last Admin: 05/23/16 13:23 Dose:  5 mg


Ibuprofen (Motrin Tab)  600 mg PO Q8 PRN


   PRN Reason: Pain, moderate (4-7)


   Last Admin: 06/14/16 08:12 Dose:  600 mg


Metoprolol Tartrate (Lopressor)  50 mg PO Q12 [facility]


   Last Admin: 06/27/16 21:24 Dose:  50 mg


Nicotine (Nicoderm Cq)  1 patch TD DAILY [facility]


   Last Admin: 06/27/16 08:12 Dose:  1 patch


Quetiapine Fumarate (Seroquel)  25 mg PO HS [facility]


   Last Admin: 06/27/16 21:24 Dose:  25 mg











- Labs


Labs: 


 





 06/23/16 06:40 





 06/23/16 06:40 





 











PT  11.2 SECONDS (9.4-12.0)   12/14/15  05:20    


 


INR  1.05  (0.89-1.20)   12/14/15  05:20    


 


APTT  36.2 SECONDS (23.1-32.0)  H  12/14/15  05:20    














Assessment and Plan


(1) DVT prophylaxis


Status: Acute





(2) CAD (coronary artery disease)


Status: Acute





(3) Smoking


Status: Chronic





(4) Low back pain


Status: Chronic





(5) Agitation


Status: Acute





(6) Scrotal edema


Status: Chronic





(7) Prediabetes


Status: Acute

## 2016-06-28 NOTE — RAD
HISTORY:

cough, congestion  



COMPARISON:

1/11/2016



TECHNIQUE:

Chest PA and lateral



FINDINGS:



LUNGS:

No active pulmonary disease.



PLEURA:

No significant pleural effusion identified. No pneumothorax apparent.



CARDIOVASCULAR:

Normal.



OSSEOUS STRUCTURES:

No significant abnormalities.



VISUALIZED UPPER ABDOMEN:

Normal.



OTHER FINDINGS:

None.



IMPRESSION:

No active disease.

## 2016-06-28 NOTE — PN
DATE: 06/28/2016



SUBJECTIVE:  The patient evaluated in bed, complains of cough, congestion 
without fever, chills, nausea, vomiting or diarrhea.  No back pain today, calm 
and cooperative, pending court date 6/30/2016.



REVIEW OF SYSTEMS:  Positive for cough and congestion.



OBJECTIVE:

GENERAL:  The patient alert and oriented at baseline.

HEART:  Regular rate and rhythm.  No murmurs, rubs or gallops.

LUNGS:  Congestion in all fields, good air entry.  No respiratory distress.

ABDOMEN:  Soft, nontender.  Bowel sounds x 4.

EXTREMITIES:  Distal pulses, motor, sensation intact.  Cap refill is brisk.



DIAGNOSES AND PLAN:  

1.  Coronary artery disease, status post cardiac arrest.  Continue medications.
  

2.  Agitation.  Neurocognitive disorder.  The patient appears to be calming 
down.  We will continue Seroquel and Depakote.  Will continue Ativan and Haldol 
as needed for acute agitation.  3.  Cough and congestion with a chest x-ray to 
rule out pneumonia versus bronchitis versus other pathology.  Mucinex and 
albuterol.  Will monitor closely.  

4.  DVT prophylaxis, SCD and AE hose, ambulation. 

5.  Nicoderm for smoking cessation and ibuprofen p.r.n. low back pain.  



We will follow up report of the chest x-ray.  We will follow up with social 
services pending outcome of guardianship hearing and will discharge when safe.





__________________________________________

Fan SILVER







cc:   



DD: 06/28/2016 10:37:53  1505

TT: 06/28/2016 11:03:33

Confirmation # [id number]

Dictation # [id number]

kali JONAS

## 2016-06-29 RX ADMIN — GUAIFENESIN AND DEXTROMETHORPHAN HYDROBROMIDE SCH TAB: 600; 30 TABLET, EXTENDED RELEASE ORAL at 17:19

## 2016-06-29 RX ADMIN — DIVALPROEX SODIUM SCH MG: 500 TABLET, DELAYED RELEASE ORAL at 17:19

## 2016-06-29 RX ADMIN — GUAIFENESIN AND DEXTROMETHORPHAN HYDROBROMIDE SCH TAB: 600; 30 TABLET, EXTENDED RELEASE ORAL at 08:44

## 2016-06-29 RX ADMIN — DIVALPROEX SODIUM SCH MG: 500 TABLET, DELAYED RELEASE ORAL at 08:43

## 2016-06-29 NOTE — CP.PCM.PN
Subjective





- Date & Time of Evaluation


Date of Evaluation: 06/29/16


Time of Evaluation: 08:01





- Subjective


Subjective: 


consult dictated


- cough/congestion


clr in all lungfields


cxr neg


afebrile





Objective





- Vital Signs/Intake and Output


Vital Signs (last 24 hours): 


 











Temp Pulse Resp BP Pulse Ox


 


 97.7 F   92 H  18   144/93 H  99 


 


 06/28/16 16:54  06/28/16 21:27  06/28/16 16:54  06/28/16 21:27  06/28/16 16:54











- Medications


Medications: 


 Current Medications





Albuterol/Ipratropium (Duoneb 3 Mg/0.5 Mg (3 Ml) Ud)  3 ml INH RQ6 PRN


   PRN Reason: Shortness of Breath


Aspirin (Ecotrin)  81 mg PO DAILY [facility]


   Last Admin: 06/28/16 10:14 Dose:  81 mg


Atorvastatin Calcium (Lipitor)  20 mg PO DAILY [facility]


   Last Admin: 06/28/16 10:13 Dose:  20 mg


Divalproex Sodium (Depakote Dr(*Bid*))  500 mg PO BID [facility]


   Last Admin: 06/28/16 18:49 Dose:  500 mg


Enalapril Maleate (Vasotec)  10 mg PO DAILY [facility]


   Last Admin: 06/28/16 10:15 Dose:  10 mg


Famotidine (Pepcid)  20 mg PO BID [facility]


   Last Admin: 06/28/16 18:49 Dose:  20 mg


Guaifenesin/Dextromethorphan (Mucinex-Dm 600-30 Mg)  1 tab PO BID [facility]


   Last Admin: 06/28/16 18:48 Dose:  1 tab


Haloperidol Lactate (Haldol)  5 mg IM Q6 PRN


   PRN Reason: Agitation


   Last Admin: 05/23/16 13:23 Dose:  5 mg


Ibuprofen (Motrin Tab)  600 mg PO Q8 PRN


   PRN Reason: Pain, moderate (4-7)


   Last Admin: 06/14/16 08:12 Dose:  600 mg


Metoprolol Tartrate (Lopressor)  50 mg PO Q12 [facility]


   Last Admin: 06/28/16 21:27 Dose:  50 mg


Nicotine (Nicoderm Cq)  1 patch TD DAILY [facility]


   Last Admin: 06/28/16 10:14 Dose:  1 patch


Quetiapine Fumarate (Seroquel)  25 mg PO HS [facility]


   Last Admin: 06/28/16 21:26 Dose:  25 mg











- Labs


Labs: 


 





 06/23/16 06:40 





 06/23/16 06:40 





 











PT  11.2 SECONDS (9.4-12.0)   12/14/15  05:20    


 


INR  1.05  (0.89-1.20)   12/14/15  05:20    


 


APTT  36.2 SECONDS (23.1-32.0)  H  12/14/15  05:20    














Assessment and Plan


(1) DVT prophylaxis


Status: Acute





(2) CAD (coronary artery disease)


Status: Acute





(3) Smoking


Status: Chronic





(4) Low back pain


Status: Chronic





(5) Agitation


Status: Acute





(6) Scrotal edema


Status: Chronic





(7) Prediabetes


Status: Acute

## 2016-06-29 NOTE — PN
DATE: 06/29/2016



The patient remains as assessed.  Cough and congestion, however, have dissipated and chest x-ray was 
negative.  No fever, chills, nausea, vomiting, diarrhea.  The patient denies complaints at this time.




REVIEW OF SYSTEMS:  Negative.



OBJECTIVE:

GENERAL:  The patient is alert and oriented at his baseline.

HEART:  Regular rate and rhythm.  No murmurs, rubs or gallops.

LUNGS:  Clear in all fields bilaterally.

ABDOMEN:  Soft, nontender.  Bowel sounds x 4.

EXTREMITIES:  Distal pulses, motor, sensation intact.  Cap refill is brisk.



DIAGNOSES AND PLAN:  Coronary artery disease, status post cardiac arrest.  Continue all medication.  
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  The patient has been calm and cooperative recently and
 has not been agitated.  Continue Seroquel, Depakote, Ativan and Haldol as needed for severe agitatio
n.  These have not been needed in recent time; however, we will keep as p.r.n. as the patient has bee
n violent in the past.  Cough, congestion.  Chest x-ray is negative, no further cough or congestion. 
 We will continue Mucinex and albuterol p.r.n. We will monitor patient for fevers.  Deep venous throm
bosis prophylaxis sequential compression devices, AE hose, ambulation.  Nicoderm for smoking cessatio
n, ibuprofen p.r.n. back pain.  We will discharge the patient when guardianship is established and th
e patient is safe for discharge





__________________________________________

Fan SILVER







cc:



DD: 06/29/2016 14:35:56  1505

TT: 06/29/2016 14:57:06

Confirmation # [id number]

Dictation # [id number]

tn

## 2016-06-30 RX ADMIN — DIVALPROEX SODIUM SCH MG: 500 TABLET, DELAYED RELEASE ORAL at 09:12

## 2016-06-30 RX ADMIN — GUAIFENESIN AND DEXTROMETHORPHAN HYDROBROMIDE SCH TAB: 600; 30 TABLET, EXTENDED RELEASE ORAL at 09:12

## 2016-06-30 RX ADMIN — GUAIFENESIN AND DEXTROMETHORPHAN HYDROBROMIDE SCH TAB: 600; 30 TABLET, EXTENDED RELEASE ORAL at 16:27

## 2016-06-30 RX ADMIN — DIVALPROEX SODIUM SCH MG: 500 TABLET, DELAYED RELEASE ORAL at 16:27

## 2016-06-30 NOTE — CP.PCM.PN
Subjective





- Date & Time of Evaluation


Date of Evaluation: 06/30/16


Time of Evaluation: 11:03





- Subjective


Subjective: 


remains comfortable nad cooperative,.  no distres/complaints


no cough/congestion


no fcnvd





Objective





- Vital Signs/Intake and Output


Vital Signs (last 24 hours): 


 











Temp Pulse Resp BP Pulse Ox


 


 97.3 F L  68   20   106/72   100 


 


 06/30/16 07:51  06/30/16 09:12  06/30/16 07:51  06/30/16 09:12  06/30/16 07:51











- Medications


Medications: 


 Current Medications





Albuterol/Ipratropium (Duoneb 3 Mg/0.5 Mg (3 Ml) Ud)  3 ml INH RQ6 PRN


   PRN Reason: Shortness of Breath


Aspirin (Ecotrin)  81 mg PO DAILY [facility]


   Last Admin: 06/30/16 09:12 Dose:  81 mg


Atorvastatin Calcium (Lipitor)  20 mg PO DAILY [facility]


   Last Admin: 06/30/16 09:12 Dose:  20 mg


Divalproex Sodium (Depakote Dr(*Bid*))  500 mg PO BID [facility]


   Last Admin: 06/30/16 09:12 Dose:  500 mg


Enalapril Maleate (Vasotec)  10 mg PO DAILY [facility]


   Last Admin: 06/30/16 09:12 Dose:  10 mg


Famotidine (Pepcid)  20 mg PO BID [facility]


   Last Admin: 06/30/16 09:11 Dose:  20 mg


Guaifenesin/Dextromethorphan (Mucinex-Dm 600-30 Mg)  1 tab PO BID [facility]


   Last Admin: 06/30/16 09:12 Dose:  1 tab


Haloperidol Lactate (Haldol)  5 mg IM Q6 PRN


   PRN Reason: Agitation


   Last Admin: 05/23/16 13:23 Dose:  5 mg


Ibuprofen (Motrin Tab)  600 mg PO Q8 PRN


   PRN Reason: Pain, moderate (4-7)


   Last Admin: 06/14/16 08:12 Dose:  600 mg


Metoprolol Tartrate (Lopressor)  50 mg PO Q12 [facility]


   Last Admin: 06/30/16 09:12 Dose:  50 mg


Nicotine (Nicoderm Cq)  1 patch TD DAILY [facility]


   Last Admin: 06/30/16 09:11 Dose:  1 patch


Quetiapine Fumarate (Seroquel)  25 mg PO HS [facility]


   Last Admin: 06/29/16 21:07 Dose:  25 mg











- Labs


Labs: 


 





 06/23/16 06:40 





 06/23/16 06:40 





 











PT  11.2 SECONDS (9.4-12.0)   12/14/15  05:20    


 


INR  1.05  (0.89-1.20)   12/14/15  05:20    


 


APTT  36.2 SECONDS (23.1-32.0)  H  12/14/15  05:20    














- Constitutional


Appears: Well, Non-toxic, No Acute Distress





- Head Exam


Head Exam: ATRAUMATIC, NORMAL INSPECTION, NORMOCEPHALIC





- Eye Exam


Eye Exam: EOMI, Normal appearance, PERRL


Pupil Exam: NORMAL ACCOMODATION, PERRL





- ENT Exam


ENT Exam: Mucous Membranes Moist, Normal Exam





- Neck Exam


Neck Exam: Full ROM, Normal Inspection.  absent: Lymphadenopathy





- Respiratory Exam


Respiratory Exam: Clear to Ausculation Bilateral, NORMAL BREATHING PATTERN





- Cardiovascular Exam


Cardiovascular Exam: REGULAR RHYTHM, +S1, +S2.  absent: Murmur





- GI/Abdominal Exam


GI & Abdominal Exam: Soft, Normal Bowel Sounds.  absent: Tenderness





- Extremities Exam


Extremities Exam: Full ROM, Normal Capillary Refill, Normal Inspection.  absent

: Joint Swelling, Pedal Edema





- Back Exam


Back Exam: NORMAL INSPECTION





- Neurological Exam


Neurological Exam: Alert, Awake, CN II-XII Intact, Normal Gait, Oriented x3





- Psychiatric Exam


Psychiatric exam: Normal Affect, Normal Mood





- Skin


Skin Exam: Dry, Intact, Normal Color, Warm





Assessment and Plan


(1) DVT prophylaxis


Status: Acute





(2) CAD (coronary artery disease)


Status: Acute





(3) Smoking


Status: Chronic





(4) Low back pain


Status: Chronic





(5) Agitation


Status: Acute





(6) Scrotal edema


Status: Chronic





(7) Prediabetes


Status: Acute








- Assessment and Plan (Free Text)


Assessment: 


Coronary artery disease, status post cardiac arrest.  Continue all medication.  

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  The patient has been calm and 

cooperative recently and has not been agitated.  Continue Seroquel, Depakote, 

Ativan and Haldol as needed for severe agitation.  These have not been needed 

in recent time; however, we will keep as p.r.n. as the patient has been violent 

in the past.  Cough, congestion.  Chest x-ray is negative, no further cough or 

congestion.  We will continue Mucinex and albuterol p.r.n. We will monitor 

patient for fevers.  Deep venous thrombosis prophylaxis sequential compression 

devices, AE hose, ambulation.  Nicoderm for smoking cessation, ibuprofen p.r.n. 

back pain.  We will discharge the patient when guardianship is established and 

the patient is safe for discharge

## 2016-07-01 RX ADMIN — GUAIFENESIN AND DEXTROMETHORPHAN HYDROBROMIDE SCH TAB: 600; 30 TABLET, EXTENDED RELEASE ORAL at 08:12

## 2016-07-01 RX ADMIN — DIVALPROEX SODIUM SCH MG: 500 TABLET, DELAYED RELEASE ORAL at 16:26

## 2016-07-01 RX ADMIN — DIVALPROEX SODIUM SCH MG: 500 TABLET, DELAYED RELEASE ORAL at 08:13

## 2016-07-01 RX ADMIN — GUAIFENESIN AND DEXTROMETHORPHAN HYDROBROMIDE SCH TAB: 600; 30 TABLET, EXTENDED RELEASE ORAL at 16:26

## 2016-07-01 NOTE — PN
DATE: 07/01/2016



The patient remains in no distress, calm and cooperative in bed.  No fever, 
chills, nausea, vomiting or diarrhea.  The patient was for a court date 
yesterday for guardianship but this did not occur.



REVIEW OF SYSTEMS:  Negative.



OBJECTIVE:

GENERAL:  Alert and oriented at the patient's baseline.

HEART:  Regular rate and rhythm.  No murmurs, rubs or gallops.

ABDOMEN:  Soft, nontender.  Bowel sounds x 4.

EXTREMITIES:  Distal pulses and motor sensation intact.  Cap refill brisk.  
Ambulatory with steady gait.



DIAGNOSES AND PLAN:  

1.  Coronary artery disease, status post cardiac arrest.  Continue medicines.

2.  Agitation related to neurocognitive disorder, on Seroquel and Depakote, 
have been working quite well for this patient; will continue the same.  Ativan 
and Haldol for acute agitation.

3.  Deep venous thrombosis prophylaxis, sequential compression devices and 
antiembolism AE hose.  

4.  Ibuprofen for low back pain and Nicoderm for smoking cessation.  



Will discharge the patient when guardianship is established and a safe 
discharge is arranged for the patient.





__________________________________________

Fan SILVER







cc:   



DD: 07/01/2016 17:43:12  1505

TT: 07/01/2016 18:20:24

Confirmation # [id number]

Dictation # [id number]

sherman [email or web address]

## 2016-07-01 NOTE — CP.PCM.PN
Subjective





- Date & Time of Evaluation


Date of Evaluation: 07/01/16


Time of Evaluation: 09:23





- Subjective


Subjective: 


note dictated


no complainrs/distress


pending guardian





Objective





- Vital Signs/Intake and Output


Vital Signs (last 24 hours): 


 











Temp Pulse Resp BP Pulse Ox


 


 97.3 F L  89   20   118/72   99 


 


 07/01/16 08:13  07/01/16 08:13  07/01/16 08:13  07/01/16 08:13  07/01/16 08:13











- Medications


Medications: 


 Current Medications





Albuterol/Ipratropium (Duoneb 3 Mg/0.5 Mg (3 Ml) Ud)  3 ml INH RQ6 PRN


   PRN Reason: Shortness of Breath


Aspirin (Ecotrin)  81 mg PO DAILY [facility]


   Last Admin: 07/01/16 08:14 Dose:  81 mg


Atorvastatin Calcium (Lipitor)  20 mg PO DAILY [facility]


   Last Admin: 07/01/16 08:13 Dose:  20 mg


Divalproex Sodium (Depakote Dr(*Bid*))  500 mg PO BID [facility]


   Last Admin: 07/01/16 08:13 Dose:  500 mg


Enalapril Maleate (Vasotec)  10 mg PO DAILY [facility]


   Last Admin: 07/01/16 08:13 Dose:  10 mg


Famotidine (Pepcid)  20 mg PO BID [facility]


   Last Admin: 07/01/16 08:13 Dose:  20 mg


Guaifenesin/Dextromethorphan (Mucinex-Dm 600-30 Mg)  1 tab PO BID [facility]


   Last Admin: 07/01/16 08:12 Dose:  1 tab


Haloperidol Lactate (Haldol)  5 mg IM Q6 PRN


   PRN Reason: Agitation


   Last Admin: 05/23/16 13:23 Dose:  5 mg


Ibuprofen (Motrin Tab)  600 mg PO Q8 PRN


   PRN Reason: Pain, moderate (4-7)


   Last Admin: 06/14/16 08:12 Dose:  600 mg


Metoprolol Tartrate (Lopressor)  50 mg PO Q12 [facility]


   Last Admin: 07/01/16 08:13 Dose:  50 mg


Nicotine (Nicoderm Cq)  1 patch TD DAILY [facility]


   Last Admin: 07/01/16 08:13 Dose:  1 patch


Quetiapine Fumarate (Seroquel)  25 mg PO HS [facility]


   Last Admin: 06/30/16 21:41 Dose:  25 mg











- Labs


Labs: 


 





 06/23/16 06:40 





 06/23/16 06:40 





 











PT  11.2 SECONDS (9.4-12.0)   12/14/15  05:20    


 


INR  1.05  (0.89-1.20)   12/14/15  05:20    


 


APTT  36.2 SECONDS (23.1-32.0)  H  12/14/15  05:20    














Assessment and Plan


(1) DVT prophylaxis


Status: Acute





(2) CAD (coronary artery disease)


Status: Acute





(3) Smoking


Status: Chronic





(4) Low back pain


Status: Chronic





(5) Agitation


Status: Acute





(6) Scrotal edema


Status: Chronic





(7) Prediabetes


Status: Acute

## 2016-07-02 RX ADMIN — DIVALPROEX SODIUM SCH MG: 500 TABLET, DELAYED RELEASE ORAL at 08:54

## 2016-07-02 RX ADMIN — GUAIFENESIN AND DEXTROMETHORPHAN HYDROBROMIDE SCH TAB: 600; 30 TABLET, EXTENDED RELEASE ORAL at 17:46

## 2016-07-02 RX ADMIN — DIVALPROEX SODIUM SCH MG: 500 TABLET, DELAYED RELEASE ORAL at 17:46

## 2016-07-02 RX ADMIN — GUAIFENESIN AND DEXTROMETHORPHAN HYDROBROMIDE SCH TAB: 600; 30 TABLET, EXTENDED RELEASE ORAL at 08:56

## 2016-07-02 NOTE — CP.PCM.PN
Subjective





- Date & Time of Evaluation


Date of Evaluation: 07/02/16


Time of Evaluation: 11:26





- Subjective


Subjective: 


calm and cooperative, no distress, no fcnvd








Objective





- Vital Signs/Intake and Output


Vital Signs (last 24 hours): 


 











Temp Pulse Resp BP Pulse Ox


 


 97.5 F L  74   20   110/73   98 


 


 07/02/16 08:59  07/02/16 08:59  07/02/16 08:59  07/02/16 08:59  07/02/16 08:59











- Medications


Medications: 


 Current Medications





Albuterol/Ipratropium (Duoneb 3 Mg/0.5 Mg (3 Ml) Ud)  3 ml INH RQ6 PRN


   PRN Reason: Shortness of Breath


Aspirin (Ecotrin)  81 mg PO DAILY [facility]


   Last Admin: 07/02/16 08:54 Dose:  81 mg


Atorvastatin Calcium (Lipitor)  20 mg PO DAILY [facility]


   Last Admin: 07/02/16 08:56 Dose:  20 mg


Divalproex Sodium (Depakote Dr(*Bid*))  500 mg PO BID [facility]


   Last Admin: 07/02/16 08:54 Dose:  500 mg


Enalapril Maleate (Vasotec)  10 mg PO DAILY [facility]


   Last Admin: 07/02/16 08:55 Dose:  10 mg


Famotidine (Pepcid)  20 mg PO BID [facility]


   Last Admin: 07/02/16 08:54 Dose:  20 mg


Guaifenesin/Dextromethorphan (Mucinex-Dm 600-30 Mg)  1 tab PO BID [facility]


   Last Admin: 07/02/16 08:56 Dose:  1 tab


Haloperidol Lactate (Haldol)  5 mg IM Q6 PRN


   PRN Reason: Agitation


   Last Admin: 05/23/16 13:23 Dose:  5 mg


Ibuprofen (Motrin Tab)  600 mg PO Q8 PRN


   PRN Reason: Pain, moderate (4-7)


   Last Admin: 06/14/16 08:12 Dose:  600 mg


Metoprolol Tartrate (Lopressor)  50 mg PO Q12 [facility]


   Last Admin: 07/02/16 08:55 Dose:  50 mg


Nicotine (Nicoderm Cq)  1 patch TD DAILY [facility]


   Last Admin: 07/02/16 08:56 Dose:  1 patch


Quetiapine Fumarate (Seroquel)  25 mg PO HS [facility]


   Last Admin: 07/01/16 21:14 Dose:  25 mg











- Labs


Labs: 


 





 06/23/16 06:40 





 06/23/16 06:40 





 











PT  11.2 SECONDS (9.4-12.0)   12/14/15  05:20    


 


INR  1.05  (0.89-1.20)   12/14/15  05:20    


 


APTT  36.2 SECONDS (23.1-32.0)  H  12/14/15  05:20    














- Constitutional


Appears: Well, Non-toxic, No Acute Distress





- Head Exam


Head Exam: ATRAUMATIC, NORMAL INSPECTION, NORMOCEPHALIC





- Eye Exam


Eye Exam: EOMI, Normal appearance, PERRL


Pupil Exam: NORMAL ACCOMODATION, PERRL





- ENT Exam


ENT Exam: Mucous Membranes Moist, Normal Exam





- Neck Exam


Neck Exam: Full ROM, Normal Inspection.  absent: Lymphadenopathy





- Respiratory Exam


Respiratory Exam: Clear to Ausculation Bilateral, NORMAL BREATHING PATTERN





- Cardiovascular Exam


Cardiovascular Exam: REGULAR RHYTHM, +S1, +S2.  absent: Murmur





- GI/Abdominal Exam


GI & Abdominal Exam: Soft, Normal Bowel Sounds.  absent: Tenderness





- Extremities Exam


Extremities Exam: Full ROM, Normal Capillary Refill, Normal Inspection.  absent

: Joint Swelling, Pedal Edema





- Back Exam


Back Exam: NORMAL INSPECTION





- Neurological Exam


Neurological Exam: Alert, Awake, CN II-XII Intact, Normal Gait, Oriented x3





- Psychiatric Exam


Psychiatric exam: Normal Affect, Normal Mood





- Skin


Skin Exam: Dry, Intact, Normal Color, Warm





Assessment and Plan


(1) DVT prophylaxis


Status: Acute





(2) CAD (coronary artery disease)


Status: Acute





(3) Smoking


Status: Chronic





(4) Low back pain


Status: Chronic





(5) Agitation


Status: Acute





(6) Scrotal edema


Status: Chronic





(7) Prediabetes


Status: Acute








- Assessment and Plan (Free Text)


Assessment: 


Coronary artery disease, status post cardiac arrest.  Continue all medication.  

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  The patient has been calm and 

cooperative recently and has not been agitated.  Continue Seroquel, Depakote, 

Ativan and Haldol as needed for severe agitation.  These have not been needed 

in recent time; however, we will keep as p.r.n. as the patient has been violent 

in the past.  Cough, congestion.  Chest x-ray is negative, no further cough or 

congestion.  We will continue Mucinex and albuterol p.r.n. We will monitor 

patient for fevers.  Deep venous thrombosis prophylaxis sequential compression 

devices, AE hose, ambulation.  Nicoderm for smoking cessation, ibuprofen p.r.n. 

back pain.  We will discharge the patient when guardianship is established and 

the patient is safe for discharge

## 2016-07-02 NOTE — PN
DATE: 07/02/2016



The patient is in bed.  No distress.  No complaint.  No fever, chills, nausea, 
vomiting, diarrhea.  States slight back pain at rest, stable at present.  The 
patient is aware of ibuprofen as a p.r.n. for back pain.



REVIEW OF SYSTEMS:  Negative at this time.  Back pain earlier today.



PHYSICAL EXAMINATION:

GENERAL:  He is alert, oriented to his baseline.

HEART:  Regular rate and rhythm.  No murmurs, rubs or gallops.

LUNGS  Clear on auscultation bilaterally.

ABDOMEN:  Soft, nontender.  Bowel sounds x 4.

EXTREMITIES:  Distal pulses, motor and sensation intact.  Cap refill is brisk.



DIAGNOSES AND PLAN:  Coronary artery disease, status post cardiac arrest.  
Continue medications.  Deep venous thrombosis prophylaxis.ambulatiion  Smoking 
cessation, Nicoderm patch.  Low back pain, ibuprofen p.r.n.  Agitation and 
neurocognitive disorder.  Seroquel, Depakote, Ativan and Haldol p.r.n. We will 
discharge patient when guardianship is established and safe disposition.





__________________________________________

Fan SILVER







cc:   



DD: 07/02/2016 16:18:43  1505

TT: 07/02/2016 17:01:27

Confirmation # [id number]

Dictation # [id number]

tn

MTDD

## 2016-07-03 RX ADMIN — DIVALPROEX SODIUM SCH MG: 500 TABLET, DELAYED RELEASE ORAL at 16:31

## 2016-07-03 RX ADMIN — GUAIFENESIN AND DEXTROMETHORPHAN HYDROBROMIDE SCH TAB: 600; 30 TABLET, EXTENDED RELEASE ORAL at 16:31

## 2016-07-03 RX ADMIN — DIVALPROEX SODIUM SCH MG: 500 TABLET, DELAYED RELEASE ORAL at 09:32

## 2016-07-03 RX ADMIN — GUAIFENESIN AND DEXTROMETHORPHAN HYDROBROMIDE SCH TAB: 600; 30 TABLET, EXTENDED RELEASE ORAL at 09:33

## 2016-07-03 NOTE — PN
DATE: 07/03/2016



SUBJECTIVE:  The patient remains in no distress.  No complaints of fever, chills, nausea, vomiting or
 diarrhea.  No back pain at present time.



REVIEW SYSTEMS:  Negative.



OBJECTIVE FINDINGS

HEART:  Regular rate and rhythm.  No murmurs, rubs or gallops.

LUNGS:  Clear in all fields was bilaterally symmetrical.

ABDOMEN:  Soft, nontender.  Bowel movements x 4.  Distal pulses noted.  Sensation intact.

Cap refill is brisk:

GENERAL:  The patient is alert and oriented at baseline.



DIAGNOSES AND PLAN:  Coronary artery disease, status post cardiac arrest.  Continue medications and a
gitation related to neurocognitive disorder.  Continue Seroquel, Depakote, Ativan and Haldol p.r.n.  
Smoking cessation, Nicoderm patch.  Low back pain, ibuprofen p.r.n.  DVT prophylaxis, SCD and AE hose
.  We will discharge when guardianship is established and is safe to do so.





__________________________________________

Fan SILVER







cc:



DD: 07/03/2016 21:26:33  1505

TT: 07/03/2016 22:23:56

Confirmation # [id number]

Dictation # [id number]

an

## 2016-07-03 NOTE — CP.PCM.PN
Subjective





- Date & Time of Evaluation


Date of Evaluation: 07/03/16


Time of Evaluation: 20:02





- Subjective


Subjective: 


no distress/colmplaints


no fcnvd


pending bguardianship





Objective





- Vital Signs/Intake and Output


Vital Signs (last 24 hours): 


 











Temp Pulse Resp BP Pulse Ox


 


 97.9 F   74   18   107/70   99 


 


 07/03/16 16:52  07/03/16 16:52  07/03/16 16:52  07/03/16 16:52  07/03/16 16:52











- Medications


Medications: 


 Current Medications





Albuterol/Ipratropium (Duoneb 3 Mg/0.5 Mg (3 Ml) Ud)  3 ml INH RQ6 PRN


   PRN Reason: Shortness of Breath


Aspirin (Ecotrin)  81 mg PO DAILY [facility]


   Last Admin: 07/03/16 09:32 Dose:  81 mg


Atorvastatin Calcium (Lipitor)  20 mg PO DAILY [facility]


   Last Admin: 07/03/16 09:32 Dose:  20 mg


Divalproex Sodium (Depakote Dr(*Bid*))  500 mg PO BID [facility]


   Last Admin: 07/03/16 16:31 Dose:  500 mg


Enalapril Maleate (Vasotec)  10 mg PO DAILY [facility]


   Last Admin: 07/03/16 09:33 Dose:  10 mg


Famotidine (Pepcid)  20 mg PO BID [facility]


   Last Admin: 07/03/16 16:31 Dose:  20 mg


Guaifenesin/Dextromethorphan (Mucinex-Dm 600-30 Mg)  1 tab PO BID [facility]


   Last Admin: 07/03/16 16:31 Dose:  1 tab


Haloperidol Lactate (Haldol)  5 mg IM Q6 PRN


   PRN Reason: Agitation


   Last Admin: 05/23/16 13:23 Dose:  5 mg


Ibuprofen (Motrin Tab)  600 mg PO Q8 PRN


   PRN Reason: Pain, moderate (4-7)


   Last Admin: 06/14/16 08:12 Dose:  600 mg


Metoprolol Tartrate (Lopressor)  50 mg PO Q12 [facility]


   Last Admin: 07/03/16 09:32 Dose:  50 mg


Nicotine (Nicoderm Cq)  1 patch TD DAILY [facility]


   Last Admin: 07/03/16 09:33 Dose:  1 patch


Quetiapine Fumarate (Seroquel)  25 mg PO HS [facility]


   Last Admin: 07/02/16 22:08 Dose:  25 mg











- Labs


Labs: 


 





 06/23/16 06:40 





 06/23/16 06:40 





 











PT  11.2 SECONDS (9.4-12.0)   12/14/15  05:20    


 


INR  1.05  (0.89-1.20)   12/14/15  05:20    


 


APTT  36.2 SECONDS (23.1-32.0)  H  12/14/15  05:20    














Assessment and Plan


(1) DVT prophylaxis


Status: Acute





(2) CAD (coronary artery disease)


Status: Acute





(3) Smoking


Status: Chronic





(4) Low back pain


Status: Chronic





(5) Agitation


Status: Acute





(6) Scrotal edema


Status: Chronic





(7) Prediabetes


Status: Acute

## 2016-07-04 RX ADMIN — GUAIFENESIN AND DEXTROMETHORPHAN HYDROBROMIDE SCH TAB: 600; 30 TABLET, EXTENDED RELEASE ORAL at 16:19

## 2016-07-04 RX ADMIN — DIVALPROEX SODIUM SCH MG: 500 TABLET, DELAYED RELEASE ORAL at 16:19

## 2016-07-04 RX ADMIN — DIVALPROEX SODIUM SCH MG: 500 TABLET, DELAYED RELEASE ORAL at 09:43

## 2016-07-04 RX ADMIN — GUAIFENESIN AND DEXTROMETHORPHAN HYDROBROMIDE SCH TAB: 600; 30 TABLET, EXTENDED RELEASE ORAL at 09:44

## 2016-07-04 NOTE — CP.PCM.PN
Subjective





- Date & Time of Evaluation


Date of Evaluation: 07/04/16


Time of Evaluation: 12:15





- Subjective


Subjective: 


no distress,compalitns


 nof/c, n/v/d


pending guardianship





Objective





- Vital Signs/Intake and Output


Vital Signs (last 24 hours): 


 











Temp Pulse Resp BP Pulse Ox


 


 97.8 F   78   18   138/70   100 


 


 07/04/16 10:00  07/04/16 10:00  07/04/16 10:00  07/04/16 10:00  07/04/16 10:00











- Medications


Medications: 


 Current Medications





Albuterol/Ipratropium (Duoneb 3 Mg/0.5 Mg (3 Ml) Ud)  3 ml INH RQ6 PRN


   PRN Reason: Shortness of Breath


Aspirin (Ecotrin)  81 mg PO DAILY [facility]


   Last Admin: 07/04/16 09:43 Dose:  81 mg


Atorvastatin Calcium (Lipitor)  20 mg PO DAILY [facility]


   Last Admin: 07/04/16 09:45 Dose:  20 mg


Divalproex Sodium (Depakote Dr(*Bid*))  500 mg PO BID [facility]


   Last Admin: 07/04/16 09:43 Dose:  500 mg


Enalapril Maleate (Vasotec)  10 mg PO DAILY [facility]


   Last Admin: 07/04/16 09:43 Dose:  10 mg


Famotidine (Pepcid)  20 mg PO BID [facility]


   Last Admin: 07/04/16 09:43 Dose:  20 mg


Guaifenesin/Dextromethorphan (Mucinex-Dm 600-30 Mg)  1 tab PO BID [facility]


   Last Admin: 07/04/16 09:44 Dose:  1 tab


Haloperidol Lactate (Haldol)  5 mg IM Q6 PRN


   PRN Reason: Agitation


   Last Admin: 05/23/16 13:23 Dose:  5 mg


Ibuprofen (Motrin Tab)  600 mg PO Q8 PRN


   PRN Reason: Pain, moderate (4-7)


   Last Admin: 06/14/16 08:12 Dose:  600 mg


Metoprolol Tartrate (Lopressor)  50 mg PO Q12 [facility]


   Last Admin: 07/04/16 09:43 Dose:  50 mg


Nicotine (Nicoderm Cq)  1 patch TD DAILY [facility]


   Last Admin: 07/04/16 09:42 Dose:  1 patch


Quetiapine Fumarate (Seroquel)  25 mg PO HS [facility]


   Last Admin: 07/03/16 21:06 Dose:  25 mg











- Labs


Labs: 


 





 06/23/16 06:40 





 06/23/16 06:40 





 











PT  11.2 SECONDS (9.4-12.0)   12/14/15  05:20    


 


INR  1.05  (0.89-1.20)   12/14/15  05:20    


 


APTT  36.2 SECONDS (23.1-32.0)  H  12/14/15  05:20    














- Constitutional


Appears: Well, Non-toxic, No Acute Distress





- Head Exam


Head Exam: ATRAUMATIC, NORMAL INSPECTION, NORMOCEPHALIC





- Eye Exam


Eye Exam: EOMI, Normal appearance, PERRL


Pupil Exam: NORMAL ACCOMODATION, PERRL





- ENT Exam


ENT Exam: Mucous Membranes Moist, Normal Exam





- Neck Exam


Neck Exam: Full ROM, Normal Inspection.  absent: Lymphadenopathy





- Respiratory Exam


Respiratory Exam: Clear to Ausculation Bilateral, NORMAL BREATHING PATTERN





- Cardiovascular Exam


Cardiovascular Exam: REGULAR RHYTHM, +S1, +S2.  absent: Murmur





- GI/Abdominal Exam


GI & Abdominal Exam: Soft, Normal Bowel Sounds.  absent: Tenderness





- Extremities Exam


Extremities Exam: Full ROM, Normal Capillary Refill, Normal Inspection.  absent

: Joint Swelling, Pedal Edema





- Back Exam


Back Exam: NORMAL INSPECTION





- Neurological Exam


Neurological Exam: Alert, Awake, CN II-XII Intact, Normal Gait, Oriented x3





- Psychiatric Exam


Psychiatric exam: Normal Affect, Normal Mood





- Skin


Skin Exam: Dry, Intact, Normal Color, Warm





Assessment and Plan


(1) DVT prophylaxis


Status: Acute





(2) CAD (coronary artery disease)


Status: Acute





(3) Smoking


Status: Chronic





(4) Low back pain


Status: Chronic





(5) Agitation


Status: Acute





(6) Scrotal edema


Status: Chronic





(7) Prediabetes


Status: Acute








- Assessment and Plan (Free Text)


Assessment: 


Coronary artery disease, status post cardiac arrest.  Continue all medication.  

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  The patient has been calm and 

cooperative recently and has not been agitated.  Continue Seroquel, Depakote, 

Ativan and Haldol as needed for severe agitation.  These have not been needed 

in recent time; however, we will keep as p.r.n. as the patient has been violent 

in the past.  Cough, congestion.  Chest x-ray is negative, no further cough or 

congestion.  We will continue Mucinex and albuterol p.r.n. We will monitor 

patient for fevers.  Deep venous thrombosis prophylaxis sequential compression 

devices, AE hose, ambulation.  Nicoderm for smoking cessation, ibuprofen p.r.n. 

back pain.  We will discharge the patient when guardianship is established and 

the patient is safe for discharge

## 2016-07-05 RX ADMIN — DIVALPROEX SODIUM SCH MG: 500 TABLET, DELAYED RELEASE ORAL at 09:36

## 2016-07-05 RX ADMIN — GUAIFENESIN AND DEXTROMETHORPHAN HYDROBROMIDE SCH TAB: 600; 30 TABLET, EXTENDED RELEASE ORAL at 09:38

## 2016-07-05 RX ADMIN — DIVALPROEX SODIUM SCH MG: 250 TABLET, DELAYED RELEASE ORAL at 18:44

## 2016-07-05 RX ADMIN — GUAIFENESIN AND DEXTROMETHORPHAN HYDROBROMIDE SCH TAB: 600; 30 TABLET, EXTENDED RELEASE ORAL at 18:45

## 2016-07-05 NOTE — PN
DATE: 07/05/2016



No distress. No complaints.  No fever, chills, nausea, vomiting, diarrhea.  The patient is sitting up
 in bed, eating at the time of eval.



REVIEW OF SYSTEMS:  Negative.



OBJECTIVE FINDINGS:

GENERAL:  Alert and oriented at baseline.

HEART:  Regular rate and rhythm.  No murmurs, rubs or gallops.

LUNGS:  Clear in all fields bilaterally.

ABDOMEN:  Soft, nontender.  Bowel sounds x 4.

EXTREMITIES:  Distal pulses, motor sensation intact.  Cap refill is brisk.



DIAGNOSES AND PLAN:  Coronary artery disease, status post cardiac arrest. Continue medication and mello
tation related to neurocognitive disorder.  Continue all medications, Nicoderm patch for smoking cess
ation, ibuprofen p.r.n. back pain.  The patient is pending guardianship for placement and will discha
rge when these are established.





__________________________________________

Fan SILVER







cc:



DD: 07/05/2016 18:02:43  1505

TT: 07/05/2016 18:40:28

Confirmation # [id number]

Dictation # [id number]

kali

## 2016-07-05 NOTE — CP.PCM.PN
Subjective





- Date & Time of Evaluation


Date of Evaluation: 07/05/16


Time of Evaluation: 13:05





- Subjective


Subjective: 


no distress,compalitns


 nof/c, n/v/d


pending guardianship





Objective





- Vital Signs/Intake and Output


Vital Signs (last 24 hours): 


 











Temp Pulse Resp BP Pulse Ox


 


 97.9 F   71   20   126/75   100 


 


 07/05/16 08:54  07/05/16 09:37  07/05/16 08:54  07/05/16 09:37  07/05/16 08:54











- Medications


Medications: 


 Current Medications





Albuterol/Ipratropium (Duoneb 3 Mg/0.5 Mg (3 Ml) Ud)  3 ml INH RQ6 PRN


   PRN Reason: Shortness of Breath


Aspirin (Ecotrin)  81 mg PO DAILY [facility]


   Last Admin: 07/05/16 09:37 Dose:  81 mg


Atorvastatin Calcium (Lipitor)  20 mg PO DAILY [facility]


   Last Admin: 07/05/16 09:37 Dose:  20 mg


Divalproex Sodium (Depakote Dr(*Bid*))  500 mg PO BID [facility]


Enalapril Maleate (Vasotec)  10 mg PO DAILY [facility]


   Last Admin: 07/05/16 09:38 Dose:  10 mg


Famotidine (Pepcid)  20 mg PO BID [facility]


   Last Admin: 07/05/16 09:38 Dose:  20 mg


Guaifenesin/Dextromethorphan (Mucinex-Dm 600-30 Mg)  1 tab PO BID [facility]


   Last Admin: 07/05/16 09:38 Dose:  1 tab


Haloperidol Lactate (Haldol)  5 mg IM Q6 PRN


   PRN Reason: Agitation


   Last Admin: 05/23/16 13:23 Dose:  5 mg


Ibuprofen (Motrin Tab)  600 mg PO Q8 PRN


   PRN Reason: Pain, moderate (4-7)


   Last Admin: 06/14/16 08:12 Dose:  600 mg


Metoprolol Tartrate (Lopressor)  50 mg PO Q12 [facility]


   Last Admin: 07/05/16 09:37 Dose:  50 mg


Nicotine (Nicoderm Cq)  1 patch TD DAILY [facility]


   Last Admin: 07/05/16 09:38 Dose:  1 patch


Quetiapine Fumarate (Seroquel)  25 mg PO HS [facility]


   Last Admin: 07/04/16 21:03 Dose:  25 mg











- Labs


Labs: 


 





 06/23/16 06:40 





 06/23/16 06:40 





 











PT  11.2 SECONDS (9.4-12.0)   12/14/15  05:20    


 


INR  1.05  (0.89-1.20)   12/14/15  05:20    


 


APTT  36.2 SECONDS (23.1-32.0)  H  12/14/15  05:20    














Assessment and Plan


(1) DVT prophylaxis


Status: Acute





(2) CAD (coronary artery disease)


Status: Acute





(3) Smoking


Status: Chronic





(4) Low back pain


Status: Chronic





(5) Agitation


Status: Acute





(6) Scrotal edema


Status: Chronic





(7) Prediabetes


Status: Acute

## 2016-07-06 RX ADMIN — GUAIFENESIN AND DEXTROMETHORPHAN HYDROBROMIDE SCH TAB: 600; 30 TABLET, EXTENDED RELEASE ORAL at 16:28

## 2016-07-06 RX ADMIN — DIVALPROEX SODIUM SCH MG: 250 TABLET, DELAYED RELEASE ORAL at 09:03

## 2016-07-06 RX ADMIN — GUAIFENESIN AND DEXTROMETHORPHAN HYDROBROMIDE SCH TAB: 600; 30 TABLET, EXTENDED RELEASE ORAL at 09:03

## 2016-07-06 RX ADMIN — DIVALPROEX SODIUM SCH MG: 250 TABLET, DELAYED RELEASE ORAL at 16:28

## 2016-07-06 NOTE — CP.PCM.PN
Subjective





- Date & Time of Evaluation


Date of Evaluation: 07/06/16


Time of Evaluation: 13:26





- Subjective


Subjective: 


no distress/complaints


no f/c, n/v/d


calma nd cooperative


pendign guardianship





Objective





- Vital Signs/Intake and Output


Vital Signs (last 24 hours): 


 











Temp Pulse Resp BP Pulse Ox


 


 97.4 F L  66   20   114/68   98 


 


 07/06/16 10:44  07/06/16 10:44  07/06/16 10:44  07/06/16 10:44  07/06/16 10:44











- Medications


Medications: 


 Current Medications





Albuterol/Ipratropium (Duoneb 3 Mg/0.5 Mg (3 Ml) Ud)  3 ml INH RQ6 PRN


   PRN Reason: Shortness of Breath


Aspirin (Ecotrin)  81 mg PO DAILY [facility]


   Last Admin: 07/06/16 09:04 Dose:  81 mg


Atorvastatin Calcium (Lipitor)  20 mg PO DAILY [facility]


   Last Admin: 07/06/16 09:03 Dose:  20 mg


Divalproex Sodium (Depakote Dr(*Bid*))  500 mg PO BID [facility]


   Last Admin: 07/06/16 09:03 Dose:  500 mg


Enalapril Maleate (Vasotec)  10 mg PO DAILY [facility]


   Last Admin: 07/06/16 09:06 Dose:  10 mg


Famotidine (Pepcid)  20 mg PO BID [facility]


   Last Admin: 07/06/16 09:03 Dose:  20 mg


Guaifenesin/Dextromethorphan (Mucinex-Dm 600-30 Mg)  1 tab PO BID [facility]


   Last Admin: 07/06/16 09:03 Dose:  1 tab


Haloperidol Lactate (Haldol)  5 mg IM Q6 PRN


   PRN Reason: Agitation


   Last Admin: 05/23/16 13:23 Dose:  5 mg


Ibuprofen (Motrin Tab)  600 mg PO Q8 PRN


   PRN Reason: Pain, moderate (4-7)


   Last Admin: 06/14/16 08:12 Dose:  600 mg


Metoprolol Tartrate (Lopressor)  50 mg PO Q12 [facility]


   Last Admin: 07/06/16 09:04 Dose:  50 mg


Nicotine (Nicoderm Cq)  1 patch TD DAILY [facility]


   Last Admin: 07/06/16 09:54 Dose:  1 patch


Quetiapine Fumarate (Seroquel)  25 mg PO HS [facility]


   Last Admin: 07/05/16 21:00 Dose:  25 mg











- Labs


Labs: 


 





 06/23/16 06:40 





 06/23/16 06:40 





 











PT  11.2 SECONDS (9.4-12.0)   12/14/15  05:20    


 


INR  1.05  (0.89-1.20)   12/14/15  05:20    


 


APTT  36.2 SECONDS (23.1-32.0)  H  12/14/15  05:20    














- Constitutional


Appears: Well, Non-toxic, No Acute Distress





- Head Exam


Head Exam: ATRAUMATIC, NORMAL INSPECTION, NORMOCEPHALIC





- Eye Exam


Eye Exam: EOMI, Normal appearance, PERRL


Pupil Exam: NORMAL ACCOMODATION, PERRL





- ENT Exam


ENT Exam: Mucous Membranes Moist, Normal Exam





- Neck Exam


Neck Exam: Full ROM, Normal Inspection.  absent: Lymphadenopathy





- Respiratory Exam


Respiratory Exam: Clear to Ausculation Bilateral, NORMAL BREATHING PATTERN





- Cardiovascular Exam


Cardiovascular Exam: REGULAR RHYTHM, RRR, +S1, +S2.  absent: Murmur





- GI/Abdominal Exam


GI & Abdominal Exam: Soft, Normal Bowel Sounds.  absent: Tenderness





- Extremities Exam


Extremities Exam: Full ROM, Normal Capillary Refill, Normal Inspection.  absent

: Joint Swelling, Pedal Edema





- Back Exam


Back Exam: NORMAL INSPECTION





- Neurological Exam


Neurological Exam: Alert, Awake, CN II-XII Intact, Normal Gait, Oriented x3





- Psychiatric Exam


Psychiatric exam: Normal Affect, Normal Mood





- Skin


Skin Exam: Dry, Intact, Normal Color, Warm





Assessment and Plan


(1) DVT prophylaxis


Assessment & Plan: 


scd and ae hose


ambulation


Status: Acute





(2) CAD (coronary artery disease)


Assessment & Plan: 


cont meds


Status: Acute





(3) Smoking


Assessment & Plan: 


nicoderm


Status: Chronic





(4) Low back pain


Assessment & Plan: 


ibuprofen prn


Status: Chronic





(5) Agitation


Assessment & Plan: 


cont meds


Status: Acute





(6) Scrotal edema


Status: Chronic





(7) Prediabetes


Assessment & Plan: 


diet


Status: Acute

## 2016-07-07 RX ADMIN — GUAIFENESIN AND DEXTROMETHORPHAN HYDROBROMIDE SCH TAB: 600; 30 TABLET, EXTENDED RELEASE ORAL at 08:17

## 2016-07-07 RX ADMIN — DIVALPROEX SODIUM SCH MG: 250 TABLET, DELAYED RELEASE ORAL at 16:04

## 2016-07-07 RX ADMIN — GUAIFENESIN AND DEXTROMETHORPHAN HYDROBROMIDE SCH TAB: 600; 30 TABLET, EXTENDED RELEASE ORAL at 16:04

## 2016-07-07 RX ADMIN — DIVALPROEX SODIUM SCH MG: 250 TABLET, DELAYED RELEASE ORAL at 08:17

## 2016-07-07 NOTE — CP.PCM.PN
Subjective





- Date & Time of Evaluation


Date of Evaluation: 07/07/16


Time of Evaluation: 08:54





- Subjective


Subjective: 


no distress/complaints


no f/c, n/v/d


pendign guardianship/placement





Objective





- Vital Signs/Intake and Output


Vital Signs (last 24 hours): 


 











Temp Pulse Resp BP Pulse Ox


 


 97.5 F L  67   18   100/64   96 


 


 07/07/16 00:43  07/07/16 08:17  07/07/16 00:43  07/07/16 08:17  07/07/16 00:43











- Medications


Medications: 


 Current Medications





Albuterol/Ipratropium (Duoneb 3 Mg/0.5 Mg (3 Ml) Ud)  3 ml INH RQ6 PRN


   PRN Reason: Shortness of Breath


Aspirin (Ecotrin)  81 mg PO DAILY [facility]


   Last Admin: 07/07/16 08:18 Dose:  81 mg


Atorvastatin Calcium (Lipitor)  20 mg PO DAILY [facility]


   Last Admin: 07/07/16 08:18 Dose:  20 mg


Divalproex Sodium (Depakote Dr(*Bid*))  500 mg PO BID [facility]


   Last Admin: 07/07/16 08:17 Dose:  500 mg


Enalapril Maleate (Vasotec)  10 mg PO DAILY [facility]


   Last Admin: 07/07/16 08:18 Dose:  10 mg


Famotidine (Pepcid)  20 mg PO BID [facility]


   Last Admin: 07/07/16 08:17 Dose:  20 mg


Guaifenesin/Dextromethorphan (Mucinex-Dm 600-30 Mg)  1 tab PO BID [facility]


   Last Admin: 07/07/16 08:17 Dose:  1 tab


Haloperidol Lactate (Haldol)  5 mg IM Q6 PRN


   PRN Reason: Agitation


   Last Admin: 05/23/16 13:23 Dose:  5 mg


Ibuprofen (Motrin Tab)  600 mg PO Q8 PRN


   PRN Reason: Pain, moderate (4-7)


   Last Admin: 07/06/16 16:34 Dose:  600 mg


Metoprolol Tartrate (Lopressor)  50 mg PO Q12 [facility]


   Last Admin: 07/07/16 08:17 Dose:  50 mg


Nicotine (Nicoderm Cq)  1 patch TD DAILY [facility]


   Last Admin: 07/07/16 08:17 Dose:  1 patch


Quetiapine Fumarate (Seroquel)  25 mg PO HS [facility]


   Last Admin: 07/06/16 21:20 Dose:  25 mg











- Labs


Labs: 


 





 06/23/16 06:40 





 06/23/16 06:40 





 











PT  11.2 SECONDS (9.4-12.0)   12/14/15  05:20    


 


INR  1.05  (0.89-1.20)   12/14/15  05:20    


 


APTT  36.2 SECONDS (23.1-32.0)  H  12/14/15  05:20    














- Constitutional


Appears: Well, Non-toxic, No Acute Distress





- Head Exam


Head Exam: ATRAUMATIC, NORMAL INSPECTION, NORMOCEPHALIC





- Eye Exam


Eye Exam: EOMI, Normal appearance, PERRL


Pupil Exam: NORMAL ACCOMODATION, PERRL





- ENT Exam


ENT Exam: Mucous Membranes Moist, Normal Exam





- Neck Exam


Neck Exam: Full ROM, Normal Inspection.  absent: Lymphadenopathy





- Respiratory Exam


Respiratory Exam: Clear to Ausculation Bilateral, NORMAL BREATHING PATTERN





- Cardiovascular Exam


Cardiovascular Exam: REGULAR RHYTHM, +S1, +S2.  absent: Murmur





- GI/Abdominal Exam


GI & Abdominal Exam: Soft, Normal Bowel Sounds.  absent: Tenderness





- Extremities Exam


Extremities Exam: Full ROM, Normal Capillary Refill, Normal Inspection.  absent

: Joint Swelling, Pedal Edema





- Back Exam


Back Exam: NORMAL INSPECTION





- Neurological Exam


Neurological Exam: Alert, Awake, CN II-XII Intact, Normal Gait, Oriented x3





- Psychiatric Exam


Psychiatric exam: Normal Affect, Normal Mood





- Skin


Skin Exam: Dry, Intact, Normal Color, Warm





Assessment and Plan


(1) DVT prophylaxis


Status: Acute





(2) CAD (coronary artery disease)


Status: Acute





(3) Smoking


Status: Chronic





(4) Low back pain


Status: Chronic





(5) Agitation


Status: Acute





(6) Scrotal edema


Status: Chronic





(7) Prediabetes


Status: Acute








- Assessment and Plan (Free Text)


Assessment: 


Coronary artery disease, status post cardiac arrest.  Continue all medication.  

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  The patient has been calm and 

cooperative recently and has not been agitated.  Continue Seroquel, Depakote, 

Ativan and Haldol as needed for severe agitation.  These have not been needed 

in recent time; however, we will keep as p.r.n. as the patient has been violent 

in the past.  Cough, congestion.  Chest x-ray is negative, no further cough or 

congestion.  We will continue Mucinex and albuterol p.r.n. We will monitor 

patient for fevers.  Deep venous thrombosis prophylaxis sequential compression 

devices, AE hose, ambulation.  Nicoderm for smoking cessation, ibuprofen p.r.n. 

back pain.  We will discharge the patient when guardianship is established and 

the patient is safe for discharge

## 2016-07-08 RX ADMIN — GUAIFENESIN AND DEXTROMETHORPHAN HYDROBROMIDE SCH TAB: 600; 30 TABLET, EXTENDED RELEASE ORAL at 10:41

## 2016-07-08 RX ADMIN — GUAIFENESIN AND DEXTROMETHORPHAN HYDROBROMIDE SCH TAB: 600; 30 TABLET, EXTENDED RELEASE ORAL at 18:27

## 2016-07-08 RX ADMIN — DIVALPROEX SODIUM SCH MG: 250 TABLET, DELAYED RELEASE ORAL at 10:40

## 2016-07-08 RX ADMIN — DIVALPROEX SODIUM SCH MG: 250 TABLET, DELAYED RELEASE ORAL at 18:27

## 2016-07-09 RX ADMIN — DIVALPROEX SODIUM SCH MG: 250 TABLET, DELAYED RELEASE ORAL at 09:40

## 2016-07-09 RX ADMIN — GUAIFENESIN AND DEXTROMETHORPHAN HYDROBROMIDE SCH TAB: 600; 30 TABLET, EXTENDED RELEASE ORAL at 09:40

## 2016-07-09 RX ADMIN — GUAIFENESIN AND DEXTROMETHORPHAN HYDROBROMIDE SCH TAB: 600; 30 TABLET, EXTENDED RELEASE ORAL at 17:07

## 2016-07-09 NOTE — CP.PCM.PN
Subjective





- Date & Time of Evaluation


Date of Evaluation: 07/09/16


Time of Evaluation: 08:11





- Subjective


Subjective: 


comfortable, no distress, no complaints. no f/c, n/v/d


pending guardian ship





Objective





- Vital Signs/Intake and Output


Vital Signs (last 24 hours): 


 











Temp Pulse Resp BP Pulse Ox


 


 97.6 F   69   18   100/67   98 


 


 07/09/16 00:00  07/09/16 00:00  07/09/16 00:00  07/09/16 00:00  07/09/16 00:00











- Medications


Medications: 


 Current Medications





Aspirin (Ecotrin)  81 mg PO DAILY [facility]


   Last Admin: 07/08/16 10:40 Dose:  81 mg


Atorvastatin Calcium (Lipitor)  20 mg PO DAILY [facility]


   Last Admin: 07/08/16 10:40 Dose:  20 mg


Divalproex Sodium (Depakote Dr(*Bid*))  500 mg PO BID [facility]


   Last Admin: 07/08/16 18:27 Dose:  500 mg


Enalapril Maleate (Vasotec)  10 mg PO DAILY [facility]


   Last Admin: 07/08/16 10:42 Dose:  10 mg


Famotidine (Pepcid)  20 mg PO BID [facility]


   Last Admin: 07/08/16 18:27 Dose:  20 mg


Guaifenesin/Dextromethorphan (Mucinex-Dm 600-30 Mg)  1 tab PO BID [facility]


   Last Admin: 07/08/16 18:27 Dose:  1 tab


Haloperidol Lactate (Haldol)  5 mg IM Q6 PRN


   PRN Reason: Agitation


   Last Admin: 05/23/16 13:23 Dose:  5 mg


Ibuprofen (Motrin Tab)  600 mg PO Q8 PRN


   PRN Reason: Pain, moderate (4-7)


   Last Admin: 07/06/16 16:34 Dose:  600 mg


Metoprolol Tartrate (Lopressor)  50 mg PO Q12 [facility]


   Last Admin: 07/08/16 21:07 Dose:  50 mg


Nicotine (Nicoderm Cq)  1 patch TD DAILY [facility]


   Last Admin: 07/08/16 10:42 Dose:  1 patch


Quetiapine Fumarate (Seroquel)  25 mg PO HS [facility]


   Last Admin: 07/08/16 21:07 Dose:  25 mg











- Labs


Labs: 


 





 06/23/16 06:40 





 06/23/16 06:40 





 











PT  11.2 SECONDS (9.4-12.0)   12/14/15  05:20    


 


INR  1.05  (0.89-1.20)   12/14/15  05:20    


 


APTT  36.2 SECONDS (23.1-32.0)  H  12/14/15  05:20    














- Constitutional


Appears: Well, Non-toxic, No Acute Distress





- Head Exam


Head Exam: ATRAUMATIC, NORMAL INSPECTION, NORMOCEPHALIC





- Eye Exam


Eye Exam: EOMI, Normal appearance, PERRL


Pupil Exam: NORMAL ACCOMODATION, PERRL





- ENT Exam


ENT Exam: Mucous Membranes Moist, Normal Exam





- Neck Exam


Neck Exam: Full ROM, Normal Inspection.  absent: Lymphadenopathy





- Respiratory Exam


Respiratory Exam: Clear to Ausculation Bilateral, NORMAL BREATHING PATTERN





- Cardiovascular Exam


Cardiovascular Exam: REGULAR RHYTHM, +S1, +S2.  absent: Murmur





- GI/Abdominal Exam


GI & Abdominal Exam: Soft, Normal Bowel Sounds.  absent: Tenderness





- Extremities Exam


Extremities Exam: Full ROM, Normal Capillary Refill, Normal Inspection.  absent

: Joint Swelling, Pedal Edema





- Back Exam


Back Exam: NORMAL INSPECTION





- Neurological Exam


Neurological Exam: Alert, Awake, CN II-XII Intact, Normal Gait, Oriented x3





- Psychiatric Exam


Psychiatric exam: Normal Affect, Normal Mood





- Skin


Skin Exam: Dry, Intact, Normal Color, Warm





Assessment and Plan


(1) DVT prophylaxis


Status: Acute





(2) CAD (coronary artery disease)


Status: Acute





(3) Smoking


Status: Chronic





(4) Low back pain


Status: Chronic





(5) Agitation


Status: Acute





(6) Scrotal edema


Status: Chronic





(7) Prediabetes


Status: Acute








- Assessment and Plan (Free Text)


Assessment: 


coronary artery disease, status post cardiac arrest.  Continue all medication.  

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  The patient has been calm and 

cooperative recently and has not been agitated.  Continue Seroquel, Depakote, 

Ativan and Haldol as needed for severe agitation.  These have not been needed 

in recent time; however, we will keep as p.r.n. as the patient has been violent 

in the past.  Cough, congestion.  Chest x-ray is negative, no further cough or 

congestion.  We will continue Mucinex and albuterol p.r.n. We will monitor 

patient for fevers.  Deep venous thrombosis prophylaxis sequential compression 

devices, AE hose, ambulation.  Nicoderm for smoking cessation, ibuprofen p.r.n. 

back pain.  We will discharge the patient when guardianship is established and 

the patient is safe for discharge

## 2016-07-10 RX ADMIN — GUAIFENESIN AND DEXTROMETHORPHAN HYDROBROMIDE SCH TAB: 600; 30 TABLET, EXTENDED RELEASE ORAL at 11:33

## 2016-07-10 RX ADMIN — GUAIFENESIN AND DEXTROMETHORPHAN HYDROBROMIDE SCH TAB: 600; 30 TABLET, EXTENDED RELEASE ORAL at 18:10

## 2016-07-10 RX ADMIN — DIVALPROEX SODIUM SCH MG: 250 TABLET, DELAYED RELEASE ORAL at 11:32

## 2016-07-10 RX ADMIN — DIVALPROEX SODIUM SCH MG: 250 TABLET, DELAYED RELEASE ORAL at 11:33

## 2016-07-10 RX ADMIN — DIVALPROEX SODIUM SCH MG: 250 TABLET, DELAYED RELEASE ORAL at 18:10

## 2016-07-10 NOTE — CP.PCM.PN
Subjective





- Date & Time of Evaluation


Date of Evaluation: 07/10/16


Time of Evaluation: 06:29





- Subjective


Subjective: 


dictated note [id number]


no compalitns


calm/cooperative


penging guardian/placement





Objective





- Vital Signs/Intake and Output


Vital Signs (last 24 hours): 


 











Temp Pulse Resp BP Pulse Ox


 


 97.7 F   64   17   132/87   100 


 


 07/10/16 01:00  07/10/16 01:00  07/10/16 01:00  07/10/16 01:00  07/10/16 01:00











- Medications


Medications: 


 Current Medications





Aspirin (Ecotrin)  81 mg PO DAILY [facility]


   Last Admin: 07/09/16 09:42 Dose:  81 mg


Atorvastatin Calcium (Lipitor)  20 mg PO DAILY [facility]


   Last Admin: 07/09/16 09:42 Dose:  20 mg


Divalproex Sodium (Depakote Dr(*Bid*))  500 mg PO BID [facility]


   Last Admin: 07/09/16 09:40 Dose:  500 mg


Enalapril Maleate (Vasotec)  10 mg PO DAILY [facility]


   Last Admin: 07/09/16 09:42 Dose:  10 mg


Famotidine (Pepcid)  20 mg PO BID [facility]


   Last Admin: 07/09/16 17:07 Dose:  20 mg


Guaifenesin/Dextromethorphan (Mucinex-Dm 600-30 Mg)  1 tab PO BID [facility]


   Last Admin: 07/09/16 17:07 Dose:  1 tab


Haloperidol Lactate (Haldol)  5 mg IM Q6 PRN


   PRN Reason: Agitation


   Last Admin: 05/23/16 13:23 Dose:  5 mg


Ibuprofen (Motrin Tab)  600 mg PO Q8 PRN


   PRN Reason: Pain, moderate (4-7)


   Last Admin: 07/06/16 16:34 Dose:  600 mg


Metoprolol Tartrate (Lopressor)  50 mg PO Q12 [facility]


   Last Admin: 07/09/16 22:04 Dose:  50 mg


Nicotine (Nicoderm Cq)  1 patch TD DAILY [facility]


   Last Admin: 07/09/16 09:42 Dose:  1 patch


Quetiapine Fumarate (Seroquel)  25 mg PO HS [facility]


   Last Admin: 07/09/16 22:03 Dose:  25 mg











- Labs


Labs: 


 





 06/23/16 06:40 





 06/23/16 06:40 





 











PT  11.2 SECONDS (9.4-12.0)   12/14/15  05:20    


 


INR  1.05  (0.89-1.20)   12/14/15  05:20    


 


APTT  36.2 SECONDS (23.1-32.0)  H  12/14/15  05:20    














Assessment and Plan


(1) DVT prophylaxis


Status: Acute





(2) CAD (coronary artery disease)


Status: Acute





(3) Smoking


Status: Chronic





(4) Low back pain


Status: Chronic





(5) Agitation


Status: Acute





(6) Scrotal edema


Status: Chronic





(7) Prediabetes


Status: Acute

## 2016-07-10 NOTE — PN
DATE: 07/10/2016



The patient is evaluated in bed, remains in no distress.  No complaints or 
distress.  No fever, chills, nausea, vomiting, or diarrhea.  The patient is 
calm and cooperative, pending guardianship.



REVIEW OF SYSTEMS:  Negative.



OBJECTIVE FINDINGS:

GENERAL:  Alert and oriented to his baseline.

LUNGS:  Clear in all fields bilaterally.

ABDOMEN:  Soft, nontender.  Bowel sounds x 4.

HEART:  Regular rate and rhythm.  No murmurs, rubs, or gallops.

EXTREMITIES:  Distal pulses, motor sensation intact.  Cap refill is brisk.



DIAGNOSES AND PLAN:  Coronary artery disease, status post cardiac arrest.  
Continue all medications.  Neurocognitive disorder with intermittent agitation.
  Continue Seroquel and Depakote.  Ativan and Haldol are ordered as p.r.n.  
Deep venous thrombosis prophylaxis:  Sequential compression devices and 
antiembolism hose, ambulation, smoking cessation, NicoDerm patch.  Intermittent 
low back pain:  Ibuprofen p.r.n.



The patient is pending guardianship, and we will discharge the patient to 
appropriate facility, once establishing guardianship is created for this 
patient.





__________________________________________

Fan SILVER







cc:   



DD: 07/10/2016 08:40:16  [id number]

TT: 07/10/2016 09:12:52

Confirmation # [id number]

Dictation # [id number]

jn

MTDD

## 2016-07-11 RX ADMIN — DIVALPROEX SODIUM SCH MG: 250 TABLET, DELAYED RELEASE ORAL at 16:11

## 2016-07-11 RX ADMIN — DIVALPROEX SODIUM SCH MG: 250 TABLET, DELAYED RELEASE ORAL at 09:04

## 2016-07-11 RX ADMIN — GUAIFENESIN AND DEXTROMETHORPHAN HYDROBROMIDE SCH TAB: 600; 30 TABLET, EXTENDED RELEASE ORAL at 16:11

## 2016-07-11 RX ADMIN — GUAIFENESIN AND DEXTROMETHORPHAN HYDROBROMIDE SCH TAB: 600; 30 TABLET, EXTENDED RELEASE ORAL at 09:04

## 2016-07-11 NOTE — CP.PCM.PN
Subjective





- Date & Time of Evaluation


Date of Evaluation: 07/11/16


Time of Evaluation: 08:38





- Subjective


Subjective: 


comfprtable, calm and cooperaitve. no distress/copmplaints


no fc nvd


for guardianship/placement





Objective





- Vital Signs/Intake and Output


Vital Signs (last 24 hours): 


 











Temp Pulse Resp BP Pulse Ox


 


 97.8 F   71   18   108/69   100 


 


 07/11/16 00:03  07/11/16 00:03  07/11/16 00:03  07/11/16 00:03  07/11/16 00:03











- Medications


Medications: 


 Current Medications





Aspirin (Ecotrin)  81 mg PO DAILY [facility]


   Last Admin: 07/10/16 11:33 Dose:  81 mg


Atorvastatin Calcium (Lipitor)  20 mg PO DAILY [facility]


   Last Admin: 07/10/16 11:34 Dose:  20 mg


Divalproex Sodium (Depakote Dr(*Bid*))  500 mg PO BID [facility]


   Last Admin: 07/10/16 18:10 Dose:  500 mg


Enalapril Maleate (Vasotec)  10 mg PO DAILY [facility]


   Last Admin: 07/10/16 11:33 Dose:  10 mg


Famotidine (Pepcid)  20 mg PO BID [facility]


   Last Admin: 07/10/16 18:10 Dose:  20 mg


Guaifenesin/Dextromethorphan (Mucinex-Dm 600-30 Mg)  1 tab PO BID [facility]


   Last Admin: 07/10/16 18:10 Dose:  1 tab


Haloperidol Lactate (Haldol)  5 mg IM Q6 PRN


   PRN Reason: Agitation


   Last Admin: 05/23/16 13:23 Dose:  5 mg


Ibuprofen (Motrin Tab)  600 mg PO Q8 PRN


   PRN Reason: Pain, moderate (4-7)


   Last Admin: 07/06/16 16:34 Dose:  600 mg


Metoprolol Tartrate (Lopressor)  50 mg PO Q12 [facility]


   Last Admin: 07/10/16 21:34 Dose:  50 mg


Nicotine (Nicoderm Cq)  1 patch TD DAILY [facility]


   Last Admin: 07/10/16 11:34 Dose:  1 patch


Quetiapine Fumarate (Seroquel)  25 mg PO HS [facility]


   Last Admin: 07/10/16 21:34 Dose:  25 mg











- Labs


Labs: 


 





 06/23/16 06:40 





 06/23/16 06:40 





 











PT  11.2 SECONDS (9.4-12.0)   12/14/15  05:20    


 


INR  1.05  (0.89-1.20)   12/14/15  05:20    


 


APTT  36.2 SECONDS (23.1-32.0)  H  12/14/15  05:20    














- Constitutional


Appears: Well, Non-toxic, No Acute Distress





- Head Exam


Head Exam: ATRAUMATIC, NORMAL INSPECTION, NORMOCEPHALIC





- Eye Exam


Eye Exam: EOMI, Normal appearance, PERRL


Pupil Exam: NORMAL ACCOMODATION, PERRL





- ENT Exam


ENT Exam: Mucous Membranes Moist, Normal Exam





- Neck Exam


Neck Exam: Full ROM, Normal Inspection.  absent: Lymphadenopathy





- Respiratory Exam


Respiratory Exam: Clear to Ausculation Bilateral, NORMAL BREATHING PATTERN





- Cardiovascular Exam


Cardiovascular Exam: REGULAR RHYTHM, +S1, +S2.  absent: Murmur





- GI/Abdominal Exam


GI & Abdominal Exam: Soft, Normal Bowel Sounds.  absent: Tenderness





- Extremities Exam


Extremities Exam: Full ROM, Normal Capillary Refill, Normal Inspection.  absent

: Joint Swelling, Pedal Edema





- Back Exam


Back Exam: NORMAL INSPECTION





- Neurological Exam


Neurological Exam: Alert, Awake, CN II-XII Intact, Normal Gait, Oriented x3





- Psychiatric Exam


Psychiatric exam: Normal Affect, Normal Mood





- Skin


Skin Exam: Dry, Intact, Normal Color, Warm





Assessment and Plan


(1) DVT prophylaxis


Status: Acute





(2) CAD (coronary artery disease)


Status: Acute





(3) Smoking


Status: Chronic





(4) Low back pain


Status: Chronic





(5) Agitation


Status: Acute





(6) Scrotal edema


Status: Chronic





(7) Prediabetes


Status: Acute








- Assessment and Plan (Free Text)


Assessment: 


coronary artery disease, status post cardiac arrest.  Continue all medication.  

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  The patient has been calm and 

cooperative recently and has not been agitated.  Continue Seroquel, Depakote, 

Ativan and Haldol as needed for severe agitation.  These have not been needed 

in recent time; however, we will keep as p.r.n. as the patient has been violent 

in the past.  Cough, congestion.  Chest x-ray is negative, no further cough or 

congestion.  We will continue Mucinex and albuterol p.r.n. We will monitor 

patient for fevers.  Deep venous thrombosis prophylaxis sequential compression 

devices, AE hose, ambulation.  Nicoderm for smoking cessation, ibuprofen p.r.n. 

back pain.  We will discharge the patient when guardianship is established and 

the patient is safe for discharge

## 2016-07-12 RX ADMIN — GUAIFENESIN AND DEXTROMETHORPHAN HYDROBROMIDE SCH TAB: 600; 30 TABLET, EXTENDED RELEASE ORAL at 17:59

## 2016-07-12 RX ADMIN — DIVALPROEX SODIUM SCH MG: 250 TABLET, DELAYED RELEASE ORAL at 17:59

## 2016-07-12 RX ADMIN — GUAIFENESIN AND DEXTROMETHORPHAN HYDROBROMIDE SCH TAB: 600; 30 TABLET, EXTENDED RELEASE ORAL at 10:18

## 2016-07-12 RX ADMIN — DIVALPROEX SODIUM SCH MG: 250 TABLET, DELAYED RELEASE ORAL at 10:21

## 2016-07-12 NOTE — CP.PCM.PN
Subjective





- Date & Time of Evaluation


Date of Evaluation: 07/12/16


Time of Evaluation: 07:59





- Subjective


Subjective: 


comfprtable, calm and cooperaitve. no distress/copmplaints


no fc nvd


for guardianship/placement


dictated note [id number]





Objective





- Vital Signs/Intake and Output


Vital Signs (last 24 hours): 


 











Temp Pulse Resp BP Pulse Ox


 


 97.5 F L  65   19   103/68   99 


 


 07/12/16 01:00  07/12/16 01:00  07/12/16 01:00  07/12/16 01:00  07/12/16 01:00











- Medications


Medications: 


 Current Medications





Aspirin (Ecotrin)  81 mg PO DAILY [facility]


   Last Admin: 07/11/16 09:04 Dose:  81 mg


Atorvastatin Calcium (Lipitor)  20 mg PO DAILY [facility]


   Last Admin: 07/11/16 09:04 Dose:  20 mg


Divalproex Sodium (Depakote Dr(*Bid*))  500 mg PO BID [facility]


   Last Admin: 07/11/16 16:11 Dose:  500 mg


Enalapril Maleate (Vasotec)  10 mg PO DAILY [facility]


   Last Admin: 07/11/16 09:05 Dose:  10 mg


Famotidine (Pepcid)  20 mg PO BID [facility]


   Last Admin: 07/11/16 16:11 Dose:  20 mg


Guaifenesin/Dextromethorphan (Mucinex-Dm 600-30 Mg)  1 tab PO BID [facility]


   Last Admin: 07/11/16 16:11 Dose:  1 tab


Haloperidol Lactate (Haldol)  5 mg IM Q6 PRN


   PRN Reason: Agitation


   Last Admin: 05/23/16 13:23 Dose:  5 mg


Ibuprofen (Motrin Tab)  600 mg PO Q8 PRN


   PRN Reason: Pain, moderate (4-7)


   Last Admin: 07/11/16 21:30 Dose:  600 mg


Metoprolol Tartrate (Lopressor)  50 mg PO Q12 [facility]


   Last Admin: 07/11/16 21:29 Dose:  50 mg


Nicotine (Nicoderm Cq)  1 patch TD DAILY [facility]


   Last Admin: 07/11/16 09:09 Dose:  1 patch


Quetiapine Fumarate (Seroquel)  25 mg PO HS [facility]


   Last Admin: 07/11/16 21:30 Dose:  25 mg











- Labs


Labs: 


 





 06/23/16 06:40 





 06/23/16 06:40 





 











PT  11.2 SECONDS (9.4-12.0)   12/14/15  05:20    


 


INR  1.05  (0.89-1.20)   12/14/15  05:20    


 


APTT  36.2 SECONDS (23.1-32.0)  H  12/14/15  05:20    














Assessment and Plan


(1) DVT prophylaxis


Status: Acute





(2) CAD (coronary artery disease)


Status: Acute





(3) Smoking


Status: Chronic





(4) Low back pain


Status: Chronic





(5) Agitation


Status: Acute





(6) Scrotal edema


Status: Chronic





(7) Prediabetes


Status: Acute

## 2016-07-12 NOTE — PN
DATE: 07/12/2016



The patient in no distress, no complaints of fever, chills, nausea, vomiting, 
diarrhea.  The patient remains pending guardianship.  



OBJECTIVE FINDINGS:

GENERAL:  Alert and oriented at baseline.

HEART:  Regular rate and rhythm.  No murmurs, rubs, or gallops.

LUNGS:  Clear in all fields bilaterally.

ABDOMEN:  Soft, nontender.  Bowel sounds x 4.

EXTREMITIES:  Distal pulses, motor sensation intact.  Cap refill is brisk.



DIAGNOSES AND PLAN:  Coronary artery disease status post cardiac arrest.  
Continue medications.  Neurocognitive disorder.  Continue all medication.  
Smoking cessation, NicoDerm patch.  Low back pain intermittent.  Ibuprofen 
p.r.n.  Deep venous thrombosis prophylaxis.  Sequential compression devices and 
antiembolism hose.



We will discharge after guardianship is established, and safe dispo is put in 
place.





__________________________________________

Fan SILVER







cc:   



DD: 07/12/2016 10:23:23  [id number]

TT: 07/12/2016 11:13:25

Confirmation # [id number]

Dictation # [id number]

jn

MTDD

## 2016-07-13 RX ADMIN — DIVALPROEX SODIUM SCH MG: 250 TABLET, DELAYED RELEASE ORAL at 16:29

## 2016-07-13 RX ADMIN — DIVALPROEX SODIUM SCH MG: 250 TABLET, DELAYED RELEASE ORAL at 09:48

## 2016-07-13 RX ADMIN — GUAIFENESIN AND DEXTROMETHORPHAN HYDROBROMIDE SCH TAB: 600; 30 TABLET, EXTENDED RELEASE ORAL at 16:29

## 2016-07-13 RX ADMIN — GUAIFENESIN AND DEXTROMETHORPHAN HYDROBROMIDE SCH TAB: 600; 30 TABLET, EXTENDED RELEASE ORAL at 09:47

## 2016-07-13 NOTE — CP.PCM.PN
Subjective





- Date & Time of Evaluation


Date of Evaluation: 07/13/16


Time of Evaluation: 09:16





- Subjective


Subjective: 


no distress/complaints. no f/c, n/v/d


court date tomorrow 7/14








Objective





- Vital Signs/Intake and Output


Vital Signs (last 24 hours): 


 











Temp Pulse Resp BP Pulse Ox


 


 97.7 F   63   20   110/72   100 


 


 07/13/16 08:48  07/13/16 08:48  07/13/16 08:48  07/13/16 08:48  07/13/16 08:48











- Medications


Medications: 


 Current Medications





Aspirin (Ecotrin)  81 mg PO DAILY [facility]


   Last Admin: 07/12/16 10:22 Dose:  81 mg


Atorvastatin Calcium (Lipitor)  20 mg PO DAILY [facility]


   Last Admin: 07/12/16 10:18 Dose:  20 mg


Divalproex Sodium (Depakote Dr(*Bid*))  500 mg PO BID [facility]


   Last Admin: 07/12/16 17:59 Dose:  500 mg


Enalapril Maleate (Vasotec)  10 mg PO DAILY [facility]


   Last Admin: 07/12/16 10:18 Dose:  10 mg


Famotidine (Pepcid)  20 mg PO BID [facility]


   Last Admin: 07/12/16 17:59 Dose:  20 mg


Guaifenesin/Dextromethorphan (Mucinex-Dm 600-30 Mg)  1 tab PO BID [facility]


   Last Admin: 07/12/16 17:59 Dose:  1 tab


Haloperidol Lactate (Haldol)  5 mg IM Q6 PRN


   PRN Reason: Agitation


   Last Admin: 05/23/16 13:23 Dose:  5 mg


Ibuprofen (Motrin Tab)  600 mg PO Q8 PRN


   PRN Reason: Pain, moderate (4-7)


   Last Admin: 07/11/16 21:30 Dose:  600 mg


Metoprolol Tartrate (Lopressor)  50 mg PO Q12 [facility]


   Last Admin: 07/12/16 21:15 Dose:  Not Given


Nicotine (Nicoderm Cq)  1 patch TD DAILY [facility]


   Last Admin: 07/12/16 10:27 Dose:  1 patch


Quetiapine Fumarate (Seroquel)  25 mg PO HS [facility]


   Last Admin: 07/12/16 21:15 Dose:  25 mg











- Labs


Labs: 


 





 06/23/16 06:40 





 06/23/16 06:40 





 











PT  11.2 SECONDS (9.4-12.0)   12/14/15  05:20    


 


INR  1.05  (0.89-1.20)   12/14/15  05:20    


 


APTT  36.2 SECONDS (23.1-32.0)  H  12/14/15  05:20    














- Constitutional


Appears: Well, Non-toxic, No Acute Distress





- Head Exam


Head Exam: ATRAUMATIC, NORMAL INSPECTION, NORMOCEPHALIC





- Eye Exam


Eye Exam: EOMI, Normal appearance, PERRL


Pupil Exam: NORMAL ACCOMODATION, PERRL





- ENT Exam


ENT Exam: Mucous Membranes Moist, Normal Exam





- Neck Exam


Neck Exam: Full ROM, Normal Inspection.  absent: Lymphadenopathy





- Respiratory Exam


Respiratory Exam: Clear to Ausculation Bilateral, NORMAL BREATHING PATTERN





- Cardiovascular Exam


Cardiovascular Exam: REGULAR RHYTHM, +S1, +S2.  absent: Murmur





- GI/Abdominal Exam


GI & Abdominal Exam: Soft, Normal Bowel Sounds.  absent: Tenderness





- Extremities Exam


Extremities Exam: Full ROM, Normal Capillary Refill, Normal Inspection.  absent

: Joint Swelling, Pedal Edema





- Back Exam


Back Exam: NORMAL INSPECTION





- Neurological Exam


Neurological Exam: Alert, Awake, CN II-XII Intact, Normal Gait, Oriented x3





- Psychiatric Exam


Psychiatric exam: Normal Affect, Normal Mood





- Skin


Skin Exam: Dry, Intact, Normal Color, Warm





Assessment and Plan


(1) DVT prophylaxis


Status: Acute





(2) CAD (coronary artery disease)


Status: Acute





(3) Smoking


Status: Chronic





(4) Low back pain


Status: Chronic





(5) Agitation


Status: Acute





(6) Scrotal edema


Status: Chronic





(7) Prediabetes


Status: Acute








- Assessment and Plan (Free Text)


Assessment: 


coronary artery disease, status post cardiac arrest.  Continue all medication.  

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  The patient has been calm and 

cooperative recently and has not been agitated.  Continue Seroquel, Depakote, 

Ativan and Haldol as needed for severe agitation.  These have not been needed 

in recent time; however, we will keep as p.r.n. as the patient has been violent 

in the past.  Cough, congestion.  Chest x-ray is negative, no further cough or 

congestion.  We will continue Mucinex and albuterol p.r.n. We will monitor 

patient for fevers.  Deep venous thrombosis prophylaxis sequential compression 

devices, AE hose, ambulation.  Nicoderm for smoking cessation, ibuprofen p.r.n. 

back pain.  We will discharge the patient when guardianship is established and 

the patient is safe for discharge

## 2016-07-14 RX ADMIN — GUAIFENESIN AND DEXTROMETHORPHAN HYDROBROMIDE SCH TAB: 600; 30 TABLET, EXTENDED RELEASE ORAL at 08:27

## 2016-07-14 RX ADMIN — DIVALPROEX SODIUM SCH MG: 250 TABLET, DELAYED RELEASE ORAL at 08:27

## 2016-07-14 RX ADMIN — GUAIFENESIN AND DEXTROMETHORPHAN HYDROBROMIDE SCH TAB: 600; 30 TABLET, EXTENDED RELEASE ORAL at 16:30

## 2016-07-14 RX ADMIN — DIVALPROEX SODIUM SCH MG: 250 TABLET, DELAYED RELEASE ORAL at 16:30

## 2016-07-14 NOTE — CP.PCM.PN
Subjective





- Date & Time of Evaluation


Date of Evaluation: 07/14/16


Time of Evaluation: 06:58





- Subjective


Subjective: 


calm and cooperative


court date today


no fc n/v/d


still requires guardianship for care as is unable to do so himself





Objective





- Vital Signs/Intake and Output


Vital Signs (last 24 hours): 


 











Temp Pulse Resp BP Pulse Ox


 


 97.9 F   66   20   132/75   100 


 


 07/13/16 16:33  07/13/16 21:11  07/13/16 16:33  07/13/16 21:11  07/13/16 16:33











- Medications


Medications: 


 Current Medications





Aspirin (Ecotrin)  81 mg PO DAILY [facility]


   Last Admin: 07/13/16 09:47 Dose:  81 mg


Atorvastatin Calcium (Lipitor)  20 mg PO DAILY [facility]


   Last Admin: 07/13/16 09:48 Dose:  20 mg


Divalproex Sodium (Depakote Dr(*Bid*))  500 mg PO BID [facility]


   Last Admin: 07/13/16 16:29 Dose:  500 mg


Enalapril Maleate (Vasotec)  10 mg PO DAILY [facility]


   Last Admin: 07/13/16 09:47 Dose:  10 mg


Famotidine (Pepcid)  20 mg PO BID [facility]


   Last Admin: 07/13/16 16:29 Dose:  20 mg


Guaifenesin/Dextromethorphan (Mucinex-Dm 600-30 Mg)  1 tab PO BID [facility]


   Last Admin: 07/13/16 16:29 Dose:  1 tab


Haloperidol Lactate (Haldol)  5 mg IM Q6 PRN


   PRN Reason: Agitation


   Last Admin: 05/23/16 13:23 Dose:  5 mg


Ibuprofen (Motrin Tab)  600 mg PO Q8 PRN


   PRN Reason: Pain, moderate (4-7)


   Last Admin: 07/11/16 21:30 Dose:  600 mg


Metoprolol Tartrate (Lopressor)  50 mg PO Q12 [facility]


   Last Admin: 07/13/16 21:11 Dose:  50 mg


Nicotine (Nicoderm Cq)  1 patch TD DAILY [facility]


   Last Admin: 07/13/16 09:46 Dose:  1 patch


Quetiapine Fumarate (Seroquel)  25 mg PO HS [facility]


   Last Admin: 07/13/16 21:11 Dose:  25 mg











- Labs


Labs: 


 





 06/23/16 06:40 





 06/23/16 06:40 





 











PT  11.2 SECONDS (9.4-12.0)   12/14/15  05:20    


 


INR  1.05  (0.89-1.20)   12/14/15  05:20    


 


APTT  36.2 SECONDS (23.1-32.0)  H  12/14/15  05:20    














- Constitutional


Appears: Well, Non-toxic, No Acute Distress





- Head Exam


Head Exam: ATRAUMATIC, NORMAL INSPECTION, NORMOCEPHALIC





- Eye Exam


Eye Exam: EOMI, Normal appearance, PERRL


Pupil Exam: NORMAL ACCOMODATION, PERRL





- ENT Exam


ENT Exam: Mucous Membranes Moist, Normal Exam





- Neck Exam


Neck Exam: Full ROM, Normal Inspection.  absent: Lymphadenopathy





- Respiratory Exam


Respiratory Exam: Clear to Ausculation Bilateral, NORMAL BREATHING PATTERN





- Cardiovascular Exam


Cardiovascular Exam: REGULAR RHYTHM, +S1, +S2.  absent: Murmur





- GI/Abdominal Exam


GI & Abdominal Exam: Soft, Normal Bowel Sounds.  absent: Tenderness





- Extremities Exam


Extremities Exam: Full ROM, Normal Capillary Refill, Normal Inspection.  absent

: Joint Swelling, Pedal Edema





- Back Exam


Back Exam: NORMAL INSPECTION





- Neurological Exam


Neurological Exam: Alert, Awake, CN II-XII Intact, Normal Gait





- Psychiatric Exam


Psychiatric exam: Normal Affect, Normal Mood





- Skin


Skin Exam: Dry, Intact, Normal Color, Warm





Assessment and Plan


(1) DVT prophylaxis


Assessment & Plan: 


scd and ae hose


ambulation


Status: Acute





(2) CAD (coronary artery disease)


Assessment & Plan: 


con tmeds


Status: Acute





(3) Smoking


Assessment & Plan: 


nicoderm


Status: Chronic





(4) Low back pain


Assessment & Plan: 


ibu profen prn


Status: Chronic





(5) Agitation


Assessment & Plan: 


r/t neurocognitive d/ol


sont meds


Status: Acute





(6) Scrotal edema


Status: Chronic





(7) Prediabetes


Assessment & Plan: 


dietary control


Status: Acute

## 2016-07-15 RX ADMIN — DIVALPROEX SODIUM SCH MG: 250 TABLET, DELAYED RELEASE ORAL at 08:34

## 2016-07-15 RX ADMIN — GUAIFENESIN AND DEXTROMETHORPHAN HYDROBROMIDE SCH TAB: 600; 30 TABLET, EXTENDED RELEASE ORAL at 16:13

## 2016-07-15 RX ADMIN — DIVALPROEX SODIUM SCH MG: 250 TABLET, DELAYED RELEASE ORAL at 16:13

## 2016-07-15 RX ADMIN — GUAIFENESIN AND DEXTROMETHORPHAN HYDROBROMIDE SCH TAB: 600; 30 TABLET, EXTENDED RELEASE ORAL at 08:35

## 2016-07-15 NOTE — CP.PCM.PN
Subjective





- Date & Time of Evaluation


Date of Evaluation: 07/15/16


Time of Evaluation: 09:09





- Subjective


Subjective: 


no f/c, n/v/d


no complaints/distress


calm and cooperative


pending guardianship/placement





Objective





- Vital Signs/Intake and Output


Vital Signs (last 24 hours): 


 











Temp Pulse Resp BP Pulse Ox


 


 97.9 F   75   20   117/77   98 


 


 07/15/16 07:53  07/15/16 08:39  07/15/16 07:53  07/15/16 08:39  07/15/16 07:53











- Medications


Medications: 


 Current Medications





Aspirin (Ecotrin)  81 mg PO DAILY [facility]


   Last Admin: 07/15/16 08:35 Dose:  81 mg


Atorvastatin Calcium (Lipitor)  20 mg PO DAILY [facility]


   Last Admin: 07/15/16 08:34 Dose:  20 mg


Divalproex Sodium (Depakote Dr(*Bid*))  500 mg PO BID [facility]


   Last Admin: 07/15/16 08:34 Dose:  500 mg


Enalapril Maleate (Vasotec)  10 mg PO DAILY [facility]


   Last Admin: 07/15/16 08:35 Dose:  10 mg


Famotidine (Pepcid)  20 mg PO BID [facility]


   Last Admin: 07/15/16 08:34 Dose:  20 mg


Guaifenesin/Dextromethorphan (Mucinex-Dm 600-30 Mg)  1 tab PO BID [facility]


   Last Admin: 07/15/16 08:35 Dose:  1 tab


Ibuprofen (Motrin Tab)  600 mg PO Q8 PRN


   PRN Reason: Pain, moderate (4-7)


   Last Admin: 07/11/16 21:30 Dose:  600 mg


Metoprolol Tartrate (Lopressor)  50 mg PO Q12 [facility]


   Last Admin: 07/15/16 08:39 Dose:  50 mg


Nicotine (Nicoderm Cq)  1 patch TD DAILY [facility]


   Last Admin: 07/14/16 08:27 Dose:  1 patch


Quetiapine Fumarate (Seroquel)  25 mg PO HS [facility]


   Last Admin: 07/14/16 21:23 Dose:  25 mg











- Labs


Labs: 


 





 06/23/16 06:40 





 06/23/16 06:40 





 











PT  11.2 SECONDS (9.4-12.0)   12/14/15  05:20    


 


INR  1.05  (0.89-1.20)   12/14/15  05:20    


 


APTT  36.2 SECONDS (23.1-32.0)  H  12/14/15  05:20    














- Constitutional


Appears: Well, Non-toxic, No Acute Distress





- Head Exam


Head Exam: ATRAUMATIC, NORMAL INSPECTION, NORMOCEPHALIC





- Eye Exam


Eye Exam: EOMI, Normal appearance, PERRL


Pupil Exam: NORMAL ACCOMODATION, PERRL





- ENT Exam


ENT Exam: Mucous Membranes Moist, Normal Exam





- Neck Exam


Neck Exam: Full ROM, Normal Inspection.  absent: Lymphadenopathy





- Respiratory Exam


Respiratory Exam: Clear to Ausculation Bilateral, NORMAL BREATHING PATTERN





- Cardiovascular Exam


Cardiovascular Exam: REGULAR RHYTHM, +S1, +S2.  absent: Murmur





- GI/Abdominal Exam


GI & Abdominal Exam: Soft, Normal Bowel Sounds.  absent: Tenderness





- Extremities Exam


Extremities Exam: Full ROM, Normal Capillary Refill, Normal Inspection.  absent

: Joint Swelling, Pedal Edema





- Back Exam


Back Exam: NORMAL INSPECTION





- Neurological Exam


Neurological Exam: Alert, Awake, CN II-XII Intact, Normal Gait, Oriented x3





- Psychiatric Exam


Psychiatric exam: Normal Affect, Normal Mood





- Skin


Skin Exam: Dry, Intact, Normal Color, Warm





Assessment and Plan


(1) DVT prophylaxis


Status: Acute





(2) CAD (coronary artery disease)


Status: Acute





(3) Smoking


Status: Chronic





(4) Low back pain


Status: Chronic





(5) Agitation


Status: Acute





(6) Scrotal edema


Status: Chronic





(7) Prediabetes


Status: Acute








- Assessment and Plan (Free Text)


Assessment: 


(1) DVT prophylaxis


Assessment & Plan: 


scd and ae hose


ambulation


Status: Acute





(2) CAD (coronary artery disease)


Assessment & Plan: 


con tmeds


Status: Acute





(3) Smoking


Assessment & Plan: 


nicoderm


Status: Chronic





(4) Low back pain


Assessment & Plan: 


ibu profen prn


Status: Chronic





(5) Agitation


Assessment & Plan: 


r/t neurocognitive d/ol


sont meds


Status: Acute





(6) Scrotal edema


Status: Chronic





(7) Prediabetes


Assessment & Plan: 


dietary control


Status: Acute

## 2016-07-16 RX ADMIN — DIVALPROEX SODIUM SCH MG: 500 TABLET, DELAYED RELEASE ORAL at 16:43

## 2016-07-16 RX ADMIN — DIVALPROEX SODIUM SCH MG: 250 TABLET, DELAYED RELEASE ORAL at 18:31

## 2016-07-16 RX ADMIN — GUAIFENESIN AND DEXTROMETHORPHAN HYDROBROMIDE SCH TAB: 600; 30 TABLET, EXTENDED RELEASE ORAL at 09:28

## 2016-07-16 RX ADMIN — GUAIFENESIN AND DEXTROMETHORPHAN HYDROBROMIDE SCH TAB: 600; 30 TABLET, EXTENDED RELEASE ORAL at 16:44

## 2016-07-16 RX ADMIN — DIVALPROEX SODIUM SCH MG: 500 TABLET, DELAYED RELEASE ORAL at 09:27

## 2016-07-16 NOTE — CP.PCM.PN
Subjective





- Date & Time of Evaluation


Date of Evaluation: 07/16/16


Time of Evaluation: 11:03





- Subjective


Subjective: 


no complaints. no f/c, n/v/d


guardianship hearing 7/29/16


calm and cooperative





Objective





- Vital Signs/Intake and Output


Vital Signs (last 24 hours): 


 











Temp Pulse Resp BP Pulse Ox


 


 97.7 F   66   20   104/73   97 


 


 07/16/16 09:00  07/16/16 09:28  07/16/16 09:00  07/16/16 09:28  07/16/16 09:00











- Medications


Medications: 


 Current Medications





Aspirin (Ecotrin)  81 mg PO DAILY [facility]


   Last Admin: 07/16/16 09:27 Dose:  81 mg


Atorvastatin Calcium (Lipitor)  20 mg PO DAILY [facility]


   Last Admin: 07/16/16 09:27 Dose:  20 mg


Divalproex Sodium (Depakote Dr(*Bid*))  500 mg PO BID [facility]


   Last Admin: 07/16/16 09:27 Dose:  500 mg


Enalapril Maleate (Vasotec)  10 mg PO DAILY [facility]


   Last Admin: 07/15/16 08:35 Dose:  10 mg


Famotidine (Pepcid)  20 mg PO BID [facility]


   Last Admin: 07/16/16 09:28 Dose:  20 mg


Guaifenesin/Dextromethorphan (Mucinex-Dm 600-30 Mg)  1 tab PO BID [facility]


   Last Admin: 07/16/16 09:28 Dose:  1 tab


Ibuprofen (Motrin Tab)  600 mg PO Q8 PRN


   PRN Reason: Pain, moderate (4-7)


   Last Admin: 07/11/16 21:30 Dose:  600 mg


Metoprolol Tartrate (Lopressor)  50 mg PO Q12 [facility]


   Last Admin: 07/16/16 09:28 Dose:  50 mg


Nicotine (Nicoderm Cq)  1 patch TD DAILY [facility]


   Last Admin: 07/16/16 09:29 Dose:  1 patch


Quetiapine Fumarate (Seroquel)  25 mg PO HS [facility]


   Last Admin: 07/15/16 21:16 Dose:  25 mg











- Labs


Labs: 


 





 06/23/16 06:40 





 06/23/16 06:40 





 











PT  11.2 SECONDS (9.4-12.0)   12/14/15  05:20    


 


INR  1.05  (0.89-1.20)   12/14/15  05:20    


 


APTT  36.2 SECONDS (23.1-32.0)  H  12/14/15  05:20    














- Constitutional


Appears: Well, Non-toxic, No Acute Distress





- Head Exam


Head Exam: ATRAUMATIC, NORMAL INSPECTION, NORMOCEPHALIC





- Eye Exam


Eye Exam: EOMI, Normal appearance, PERRL


Pupil Exam: NORMAL ACCOMODATION, PERRL





- ENT Exam


ENT Exam: Mucous Membranes Moist, Normal Exam





- Neck Exam


Neck Exam: Full ROM, Normal Inspection.  absent: Lymphadenopathy





- Respiratory Exam


Respiratory Exam: Clear to Ausculation Bilateral, NORMAL BREATHING PATTERN





- Cardiovascular Exam


Cardiovascular Exam: REGULAR RHYTHM, +S1, +S2.  absent: Murmur





- GI/Abdominal Exam


GI & Abdominal Exam: Soft, Normal Bowel Sounds.  absent: Tenderness





- Extremities Exam


Extremities Exam: Full ROM, Normal Capillary Refill, Normal Inspection.  absent

: Joint Swelling, Pedal Edema





- Back Exam


Back Exam: NORMAL INSPECTION





- Neurological Exam


Neurological Exam: Alert, Awake, CN II-XII Intact, Normal Gait, Oriented x3





- Psychiatric Exam


Psychiatric exam: Normal Affect, Normal Mood





- Skin


Skin Exam: Dry, Intact, Normal Color, Warm





Assessment and Plan


(1) DVT prophylaxis


Assessment & Plan: 


scd nad ae hose


ambulation


Status: Acute





(2) CAD (coronary artery disease)


Assessment & Plan: 


cont meds


Status: Acute





(3) Smoking


Assessment & Plan: 


nicoderm


Status: Chronic





(4) Low back pain


Assessment & Plan: 


ibuprofen prn


Status: Chronic





(5) Agitation


Assessment & Plan: 


r./t neurocognitive d/o


cont meds


Status: Acute





(6) Scrotal edema


Status: Chronic





(7) Prediabetes


Assessment & Plan: 


dietary control


Status: Acute

## 2016-07-17 RX ADMIN — DIVALPROEX SODIUM SCH MG: 250 TABLET, DELAYED RELEASE ORAL at 16:46

## 2016-07-17 RX ADMIN — DIVALPROEX SODIUM SCH MG: 250 TABLET, DELAYED RELEASE ORAL at 09:55

## 2016-07-17 RX ADMIN — GUAIFENESIN AND DEXTROMETHORPHAN HYDROBROMIDE SCH TAB: 600; 30 TABLET, EXTENDED RELEASE ORAL at 16:46

## 2016-07-17 RX ADMIN — GUAIFENESIN AND DEXTROMETHORPHAN HYDROBROMIDE SCH TAB: 600; 30 TABLET, EXTENDED RELEASE ORAL at 09:54

## 2016-07-17 NOTE — CP.PCM.PN
Subjective





- Date & Time of Evaluation


Date of Evaluation: 07/17/16


Time of Evaluation: 19:01





- Subjective


Subjective: 





no distress/complaints. no f/c, n/v/d


pending guardianship





Objective





- Vital Signs/Intake and Output


Vital Signs (last 24 hours): 


 











Temp Pulse Resp BP Pulse Ox


 


 97.9 F   70   20   119/69   100 


 


 07/17/16 16:22  07/17/16 20:59  07/17/16 16:22  07/17/16 20:59  07/17/16 16:22











- Medications


Medications: 


 Current Medications





Aspirin (Ecotrin)  81 mg PO DAILY [facility]


   Last Admin: 07/17/16 09:53 Dose:  81 mg


Atorvastatin Calcium (Lipitor)  20 mg PO DAILY [facility]


   Last Admin: 07/17/16 09:53 Dose:  20 mg


Divalproex Sodium (Depakote Dr(*Bid*))  500 mg PO BID [facility]


   Last Admin: 07/17/16 16:46 Dose:  500 mg


Enalapril Maleate (Vasotec)  10 mg PO DAILY [facility]


   Last Admin: 07/17/16 09:55 Dose:  10 mg


Famotidine (Pepcid)  20 mg PO BID [facility]


   Last Admin: 07/17/16 16:46 Dose:  20 mg


Guaifenesin/Dextromethorphan (Mucinex-Dm 600-30 Mg)  1 tab PO BID [facility]


   Last Admin: 07/17/16 16:46 Dose:  1 tab


Ibuprofen (Motrin Tab)  600 mg PO Q8 PRN


   PRN Reason: Pain, moderate (4-7)


   Last Admin: 07/11/16 21:30 Dose:  600 mg


Metoprolol Tartrate (Lopressor)  50 mg PO Q12 [facility]


   Last Admin: 07/17/16 20:59 Dose:  50 mg


Nicotine (Nicoderm Cq)  1 patch TD DAILY [facility]


   Last Admin: 07/17/16 09:54 Dose:  1 patch


Quetiapine Fumarate (Seroquel)  25 mg PO HS [facility]


   Last Admin: 07/17/16 21:00 Dose:  25 mg











- Labs


Labs: 


 





 06/23/16 06:40 





 06/23/16 06:40 





 











PT  11.2 SECONDS (9.4-12.0)   12/14/15  05:20    


 


INR  1.05  (0.89-1.20)   12/14/15  05:20    


 


APTT  36.2 SECONDS (23.1-32.0)  H  12/14/15  05:20    














- Constitutional


Appears: Well, Non-toxic, No Acute Distress





- Head Exam


Head Exam: ATRAUMATIC, NORMAL INSPECTION, NORMOCEPHALIC





- Eye Exam


Eye Exam: EOMI, Normal appearance, PERRL


Pupil Exam: NORMAL ACCOMODATION, PERRL





- ENT Exam


ENT Exam: Mucous Membranes Moist, Normal Exam





- Neck Exam


Neck Exam: Full ROM, Normal Inspection.  absent: Lymphadenopathy





- Respiratory Exam


Respiratory Exam: Clear to Ausculation Bilateral, NORMAL BREATHING PATTERN





- Cardiovascular Exam


Cardiovascular Exam: REGULAR RHYTHM, +S1, +S2.  absent: Murmur





- GI/Abdominal Exam


GI & Abdominal Exam: Soft, Normal Bowel Sounds.  absent: Tenderness





- Rectal Exam


Rectal Exam: NORMAL INSPECTION





- Extremities Exam


Extremities Exam: Full ROM, Normal Capillary Refill, Normal Inspection.  absent

: Joint Swelling, Pedal Edema





- Back Exam


Back Exam: NORMAL INSPECTION





- Neurological Exam


Neurological Exam: Alert, Awake, CN II-XII Intact, Normal Gait, Oriented x3





- Psychiatric Exam


Psychiatric exam: Normal Affect, Normal Mood





- Skin


Skin Exam: Dry, Intact, Normal Color, Warm





Assessment and Plan


(1) DVT prophylaxis


Status: Acute





(2) CAD (coronary artery disease)


Status: Acute





(3) Smoking


Status: Chronic





(4) Low back pain


Status: Chronic





(5) Agitation


Status: Acute





(6) Scrotal edema


Status: Chronic





(7) Prediabetes


Status: Acute








- Assessment and Plan (Free Text)


Assessment: 


(1) DVT prophylaxis


Assessment & Plan: 


scd and ae hose


ambulation


Status: Acute





(2) CAD (coronary artery disease)


Assessment & Plan: 


con tmeds


Status: Acute





(3) Smoking


Assessment & Plan: 


nicoderm


Status: Chronic





(4) Low back pain


Assessment & Plan: 


ibu profen prn


Status: Chronic





(5) Agitation


Assessment & Plan: 


r/t neurocognitive d/ol


sont meds


Status: Acute





(6) Scrotal edema


Status: Chronic





(7) Prediabetes


Assessment & Plan: 


dietary control


Status: Acute

## 2016-07-18 RX ADMIN — DIVALPROEX SODIUM SCH MG: 250 TABLET, DELAYED RELEASE ORAL at 09:01

## 2016-07-18 RX ADMIN — DIVALPROEX SODIUM SCH MG: 250 TABLET, DELAYED RELEASE ORAL at 17:16

## 2016-07-18 RX ADMIN — GUAIFENESIN AND DEXTROMETHORPHAN HYDROBROMIDE SCH TAB: 600; 30 TABLET, EXTENDED RELEASE ORAL at 17:16

## 2016-07-18 RX ADMIN — GUAIFENESIN AND DEXTROMETHORPHAN HYDROBROMIDE SCH TAB: 600; 30 TABLET, EXTENDED RELEASE ORAL at 09:03

## 2016-07-18 NOTE — CP.PCM.PN
Subjective





- Date & Time of Evaluation


Date of Evaluation: 07/18/16


Time of Evaluation: 12:56





- Subjective


Subjective: 


no distress/complaints. no f/c, n/v/d


pending guardianship





Objective





- Vital Signs/Intake and Output


Vital Signs (last 24 hours): 


 











Temp Pulse Resp BP Pulse Ox


 


 98.4 F   68   20   110/70   98 


 


 07/18/16 07:27  07/18/16 09:04  07/18/16 07:27  07/18/16 09:04  07/18/16 07:27











- Medications


Medications: 


 Current Medications





Aspirin (Ecotrin)  81 mg PO DAILY [facility]


   Last Admin: 07/18/16 09:02 Dose:  81 mg


Atorvastatin Calcium (Lipitor)  20 mg PO DAILY [facility]


   Last Admin: 07/18/16 09:02 Dose:  20 mg


Divalproex Sodium (Depakote Dr(*Bid*))  500 mg PO BID [facility]


   Last Admin: 07/18/16 09:01 Dose:  500 mg


Enalapril Maleate (Vasotec)  10 mg PO DAILY [facility]


   Last Admin: 07/18/16 09:05 Dose:  10 mg


Famotidine (Pepcid)  20 mg PO BID [facility]


   Last Admin: 07/18/16 09:03 Dose:  20 mg


Guaifenesin/Dextromethorphan (Mucinex-Dm 600-30 Mg)  1 tab PO BID [facility]


   Last Admin: 07/18/16 09:03 Dose:  1 tab


Ibuprofen (Motrin Tab)  600 mg PO Q8 PRN


   PRN Reason: Pain, moderate (4-7)


   Last Admin: 07/11/16 21:30 Dose:  600 mg


Metoprolol Tartrate (Lopressor)  50 mg PO Q12 [facility]


   Last Admin: 07/18/16 09:04 Dose:  50 mg


Nicotine (Nicoderm Cq)  1 patch TD DAILY [facility]


   Last Admin: 07/18/16 09:05 Dose:  1 patch


Quetiapine Fumarate (Seroquel)  25 mg PO HS [facility]


   Last Admin: 07/17/16 21:00 Dose:  25 mg











- Labs


Labs: 


 





 06/23/16 06:40 





 06/23/16 06:40 





 











PT  11.2 SECONDS (9.4-12.0)   12/14/15  05:20    


 


INR  1.05  (0.89-1.20)   12/14/15  05:20    


 


APTT  36.2 SECONDS (23.1-32.0)  H  12/14/15  05:20    














- Constitutional


Appears: Well, Non-toxic, No Acute Distress





- Head Exam


Head Exam: ATRAUMATIC, NORMAL INSPECTION, NORMOCEPHALIC





- Eye Exam


Eye Exam: EOMI, Normal appearance, PERRL


Pupil Exam: NORMAL ACCOMODATION, PERRL





- ENT Exam


ENT Exam: Mucous Membranes Moist, Normal Exam





- Neck Exam


Neck Exam: Full ROM, Normal Inspection.  absent: Lymphadenopathy





- Respiratory Exam


Respiratory Exam: Clear to Ausculation Bilateral, NORMAL BREATHING PATTERN





- Cardiovascular Exam


Cardiovascular Exam: REGULAR RHYTHM, +S1, +S2.  absent: Murmur





- GI/Abdominal Exam


GI & Abdominal Exam: Soft, Normal Bowel Sounds.  absent: Tenderness





- Extremities Exam


Extremities Exam: Full ROM, Normal Capillary Refill, Normal Inspection.  absent

: Joint Swelling, Pedal Edema





- Back Exam


Back Exam: NORMAL INSPECTION





- Neurological Exam


Neurological Exam: Alert, Awake, CN II-XII Intact, Normal Gait, Oriented x3





- Psychiatric Exam


Psychiatric exam: Normal Affect, Normal Mood





- Skin


Skin Exam: Dry, Intact, Normal Color, Warm





Assessment and Plan


(1) DVT prophylaxis


Status: Acute





(2) CAD (coronary artery disease)


Status: Acute





(3) Smoking


Status: Chronic





(4) Low back pain


Status: Chronic





(5) Agitation


Status: Acute





(6) Scrotal edema


Status: Chronic





(7) Prediabetes


Status: Acute








- Assessment and Plan (Free Text)


Assessment: 


(1) DVT prophylaxis


Assessment & Plan: 


scd and ae hose


ambulation


Status: Acute





(2) CAD (coronary artery disease)


Assessment & Plan: 


con tmeds


Status: Acute





(3) Smoking


Assessment & Plan: 


nicoderm


Status: Chronic





(4) Low back pain


Assessment & Plan: 


ibu profen prn


Status: Chronic





(5) Agitation


Assessment & Plan: 


r/t neurocognitive d/ol


sont meds


Status: Acute





(6) Scrotal edema


Status: Chronic





(7) Prediabetes


Assessment & Plan: 


dietary control


Status: Acute

## 2016-07-19 RX ADMIN — DIVALPROEX SODIUM SCH MG: 250 TABLET, DELAYED RELEASE ORAL at 17:05

## 2016-07-19 RX ADMIN — DIVALPROEX SODIUM SCH MG: 250 TABLET, DELAYED RELEASE ORAL at 09:19

## 2016-07-19 RX ADMIN — GUAIFENESIN AND DEXTROMETHORPHAN HYDROBROMIDE SCH TAB: 600; 30 TABLET, EXTENDED RELEASE ORAL at 09:20

## 2016-07-19 RX ADMIN — GUAIFENESIN AND DEXTROMETHORPHAN HYDROBROMIDE SCH TAB: 600; 30 TABLET, EXTENDED RELEASE ORAL at 17:05

## 2016-07-19 NOTE — CP.PCM.PN
Subjective





- Date & Time of Evaluation


Date of Evaluation: 07/19/16


Time of Evaluation: 10:32





- Subjective


Subjective: 


dictated note [id number]


pending guardianship





Objective





- Vital Signs/Intake and Output


Vital Signs (last 24 hours): 


 











Temp Pulse Resp BP Pulse Ox


 


 98.1 F   89   20   108/84   97 


 


 07/19/16 08:10  07/19/16 09:20  07/19/16 08:10  07/19/16 09:20  07/19/16 08:10











- Medications


Medications: 


 Current Medications





Aspirin (Ecotrin)  81 mg PO DAILY [facility]


   Last Admin: 07/19/16 09:19 Dose:  81 mg


Atorvastatin Calcium (Lipitor)  20 mg PO DAILY [facility]


   Last Admin: 07/19/16 09:19 Dose:  20 mg


Divalproex Sodium (Depakote Dr(*Bid*))  500 mg PO BID [facility]


   Last Admin: 07/19/16 09:19 Dose:  500 mg


Enalapril Maleate (Vasotec)  10 mg PO DAILY [facility]


   Last Admin: 07/19/16 09:21 Dose:  10 mg


Famotidine (Pepcid)  20 mg PO BID [facility]


   Last Admin: 07/19/16 09:21 Dose:  20 mg


Guaifenesin/Dextromethorphan (Mucinex-Dm 600-30 Mg)  1 tab PO BID [facility]


   Last Admin: 07/19/16 09:20 Dose:  1 tab


Ibuprofen (Motrin Tab)  600 mg PO Q8 PRN


   PRN Reason: Pain, moderate (4-7)


   Last Admin: 07/11/16 21:30 Dose:  600 mg


Metoprolol Tartrate (Lopressor)  50 mg PO Q12 [facility]


   Last Admin: 07/19/16 09:20 Dose:  50 mg


Nicotine (Nicoderm Cq)  1 patch TD DAILY [facility]


   Last Admin: 07/19/16 09:21 Dose:  1 patch


Quetiapine Fumarate (Seroquel)  25 mg PO HS [facility]


   Last Admin: 07/18/16 21:01 Dose:  25 mg











- Labs


Labs: 


 





 06/23/16 06:40 





 06/23/16 06:40 





 











PT  11.2 SECONDS (9.4-12.0)   12/14/15  05:20    


 


INR  1.05  (0.89-1.20)   12/14/15  05:20    


 


APTT  36.2 SECONDS (23.1-32.0)  H  12/14/15  05:20    














Assessment and Plan


(1) DVT prophylaxis


Status: Acute





(2) CAD (coronary artery disease)


Status: Acute





(3) Smoking


Status: Chronic





(4) Low back pain


Status: Chronic





(5) Agitation


Status: Acute





(6) Scrotal edema


Status: Chronic





(7) Prediabetes


Status: Acute

## 2016-07-19 NOTE — PN
DATE: 07/19/2016



The patient is evaluated in bed.  No distress.  No complaints of fever, chills, nausea, vomiting, john
rrhea.  The patient is pending guardian hearing 7/29/2016.



REVIEW OF SYSTEMS:  Negative.



OBJECTIVE FINDINGS:

GENERAL:  Alert, oriented to baseline.

HEART:  Regular rate and rhythm.  No murmurs, rubs or gallops.

LUNGS:  Clear in all fields bilaterally.

ABDOMEN:  Soft, nontender.  Bowel sounds x 4.

EXTREMITIES:  Distal pulses, motor sensation intact.  Cap refill is brisk.



DIAGNOSES AND PLAN:  Coronary artery disease, status post cardiac arrest.  Continue medications.  Bubba
rocognitive disorder.  Continue the medication, has however required p.r.n. recently.  Deep venous th
rombosis prophylaxis, sequential compression device, and antiembolism hose, ambulation.  Prediabetes,
 dietary control.  Smoking cessation, Nicoderm.  Intermittent low back pain, ibuprofen.  Will dischar
ge once guardianship is established.





__________________________________________

Fan SILVER







cc:



DD: 07/19/2016 14:44:41  [id number]

TT: 07/19/2016 15:11:02

Confirmation # [id number]

Dictation # [id number]

rn

## 2016-07-20 RX ADMIN — DIVALPROEX SODIUM SCH MG: 250 TABLET, DELAYED RELEASE ORAL at 09:00

## 2016-07-20 RX ADMIN — GUAIFENESIN AND DEXTROMETHORPHAN HYDROBROMIDE SCH TAB: 600; 30 TABLET, EXTENDED RELEASE ORAL at 16:54

## 2016-07-20 RX ADMIN — GUAIFENESIN AND DEXTROMETHORPHAN HYDROBROMIDE SCH TAB: 600; 30 TABLET, EXTENDED RELEASE ORAL at 09:02

## 2016-07-20 RX ADMIN — DIVALPROEX SODIUM SCH MG: 250 TABLET, DELAYED RELEASE ORAL at 16:53

## 2016-07-20 NOTE — CP.PCM.PN
Subjective





- Date & Time of Evaluation


Date of Evaluation: 07/20/16


Time of Evaluation: 12:01





- Subjective


Subjective: 


calm and cooperative


 no f/c, n/v/d


no distress


pending court date 7/29/16





Objective





- Vital Signs/Intake and Output


Vital Signs (last 24 hours): 


 











Temp Pulse Resp BP Pulse Ox


 


 97.1 F L  67   20   110/72   97 


 


 07/20/16 07:31  07/20/16 09:01  07/20/16 07:31  07/20/16 09:01  07/20/16 07:31











- Medications


Medications: 


 Current Medications





Aspirin (Ecotrin)  81 mg PO DAILY [facility]


   Last Admin: 07/20/16 09:01 Dose:  81 mg


Atorvastatin Calcium (Lipitor)  20 mg PO DAILY [facility]


   Last Admin: 07/20/16 09:01 Dose:  20 mg


Divalproex Sodium (Depakote Dr(*Bid*))  500 mg PO BID [facility]


   Last Admin: 07/20/16 09:00 Dose:  500 mg


Enalapril Maleate (Vasotec)  10 mg PO DAILY [facility]


   Last Admin: 07/20/16 09:03 Dose:  10 mg


Famotidine (Pepcid)  20 mg PO BID [facility]


   Last Admin: 07/20/16 09:03 Dose:  20 mg


Guaifenesin/Dextromethorphan (Mucinex-Dm 600-30 Mg)  1 tab PO BID [facility]


   Last Admin: 07/20/16 09:02 Dose:  1 tab


Ibuprofen (Motrin Tab)  600 mg PO Q8 PRN


   PRN Reason: Pain, moderate (4-7)


   Last Admin: 07/11/16 21:30 Dose:  600 mg


Metoprolol Tartrate (Lopressor)  50 mg PO Q12 [facility]


   Last Admin: 07/20/16 09:01 Dose:  50 mg


Nicotine (Nicoderm Cq)  1 patch TD DAILY [facility]


   Last Admin: 07/20/16 09:02 Dose:  1 patch


Quetiapine Fumarate (Seroquel)  25 mg PO HS [facility]


   Last Admin: 07/19/16 21:43 Dose:  25 mg











- Labs


Labs: 


 





 06/23/16 06:40 





 06/23/16 06:40 





 











PT  11.2 SECONDS (9.4-12.0)   12/14/15  05:20    


 


INR  1.05  (0.89-1.20)   12/14/15  05:20    


 


APTT  36.2 SECONDS (23.1-32.0)  H  12/14/15  05:20    














- Constitutional


Appears: Well, Non-toxic, No Acute Distress





- Head Exam


Head Exam: ATRAUMATIC, NORMAL INSPECTION, NORMOCEPHALIC





- Eye Exam


Eye Exam: EOMI, Normal appearance, PERRL


Pupil Exam: NORMAL ACCOMODATION, PERRL





- ENT Exam


ENT Exam: Mucous Membranes Moist, Normal Exam





- Neck Exam


Neck Exam: Full ROM, Normal Inspection.  absent: Lymphadenopathy





- Respiratory Exam


Respiratory Exam: Clear to Ausculation Bilateral, NORMAL BREATHING PATTERN





- Cardiovascular Exam


Cardiovascular Exam: REGULAR RHYTHM, +S1, +S2.  absent: Murmur





- GI/Abdominal Exam


GI & Abdominal Exam: Soft, Normal Bowel Sounds.  absent: Tenderness





- Extremities Exam


Extremities Exam: Full ROM, Normal Capillary Refill, Normal Inspection.  absent

: Joint Swelling, Pedal Edema





- Back Exam


Back Exam: NORMAL INSPECTION





- Neurological Exam


Neurological Exam: Alert, Awake, CN II-XII Intact, Normal Gait, Oriented x3





- Psychiatric Exam


Psychiatric exam: Normal Affect, Normal Mood





- Skin


Skin Exam: Dry, Intact, Normal Color, Warm





Assessment and Plan


(1) DVT prophylaxis


Status: Acute





(2) CAD (coronary artery disease)


Status: Acute





(3) Smoking


Status: Chronic





(4) Low back pain


Status: Chronic





(5) Agitation


Status: Acute





(6) Scrotal edema


Status: Chronic





(7) Prediabetes


Status: Acute








- Assessment and Plan (Free Text)


Assessment: 


Coronary artery disease, status post cardiac arrest.  Continue all medication.  

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  The patient has been calm and 

cooperative recently and has not been agitated.  Continue Seroquel, Depakote, 

Ativan and Haldol as needed for severe agitation.  These have not been needed 

in recent time; however, we will keep as p.r.n. as the patient has been violent 

in the past.  Cough, congestion.  Chest x-ray is negative, no further cough or 

congestion.  We will continue Mucinex and albuterol p.r.n. We will monitor 

patient for fevers.  Deep venous thrombosis prophylaxis sequential compression 

devices, AE hose, ambulation.  Nicoderm for smoking cessation, ibuprofen p.r.n. 

back pain.  We will discharge the patient when guardianship is established and 

the patient is safe for discharge

## 2016-07-21 RX ADMIN — DIVALPROEX SODIUM SCH MG: 250 TABLET, DELAYED RELEASE ORAL at 16:57

## 2016-07-21 RX ADMIN — DIVALPROEX SODIUM SCH MG: 250 TABLET, DELAYED RELEASE ORAL at 09:33

## 2016-07-21 RX ADMIN — GUAIFENESIN AND DEXTROMETHORPHAN HYDROBROMIDE SCH TAB: 600; 30 TABLET, EXTENDED RELEASE ORAL at 09:35

## 2016-07-21 RX ADMIN — GUAIFENESIN AND DEXTROMETHORPHAN HYDROBROMIDE SCH TAB: 600; 30 TABLET, EXTENDED RELEASE ORAL at 16:57

## 2016-07-21 NOTE — CP.PCM.PN
Subjective





- Date & Time of Evaluation


Date of Evaluation: 07/21/16


Time of Evaluation: 11:55





- Subjective


Subjective: 


pending guardianship


no f/c, n/v/d


no distress/complaints





Objective





- Vital Signs/Intake and Output


Vital Signs (last 24 hours): 


 











Temp Pulse Resp BP Pulse Ox


 


 97.7 F   65   20   94/68 L  100 


 


 07/21/16 08:00  07/21/16 09:34  07/21/16 08:00  07/21/16 09:34  07/21/16 08:00











- Medications


Medications: 


 Current Medications





Aspirin (Ecotrin)  81 mg PO DAILY [facility]


   Last Admin: 07/21/16 09:33 Dose:  81 mg


Atorvastatin Calcium (Lipitor)  20 mg PO DAILY [facility]


   Last Admin: 07/21/16 09:34 Dose:  20 mg


Divalproex Sodium (Depakote Dr(*Bid*))  500 mg PO BID [facility]


   Last Admin: 07/21/16 09:33 Dose:  500 mg


Enalapril Maleate (Vasotec)  10 mg PO DAILY [facility]


   Last Admin: 07/21/16 09:36 Dose:  Not Given


Famotidine (Pepcid)  20 mg PO BID [facility]


   Last Admin: 07/21/16 09:36 Dose:  20 mg


Guaifenesin/Dextromethorphan (Mucinex-Dm 600-30 Mg)  1 tab PO BID [facility]


   Last Admin: 07/21/16 09:35 Dose:  1 tab


Ibuprofen (Motrin Tab)  600 mg PO Q8 PRN


   PRN Reason: Pain, moderate (4-7)


   Last Admin: 07/11/16 21:30 Dose:  600 mg


Metoprolol Tartrate (Lopressor)  50 mg PO Q12 [facility]


   Last Admin: 07/21/16 09:34 Dose:  Not Given


Nicotine (Nicoderm Cq)  1 patch TD DAILY [facility]


   Last Admin: 07/21/16 09:35 Dose:  1 patch


Quetiapine Fumarate (Seroquel)  25 mg PO HS [facility]


   Last Admin: 07/20/16 21:05 Dose:  25 mg











- Labs


Labs: 


 





 06/23/16 06:40 





 06/23/16 06:40 





 











PT  11.2 SECONDS (9.4-12.0)   12/14/15  05:20    


 


INR  1.05  (0.89-1.20)   12/14/15  05:20    


 


APTT  36.2 SECONDS (23.1-32.0)  H  12/14/15  05:20    














- Constitutional


Appears: Well, Non-toxic, No Acute Distress





- Head Exam


Head Exam: ATRAUMATIC, NORMAL INSPECTION, NORMOCEPHALIC





- Eye Exam


Eye Exam: EOMI, Normal appearance, PERRL


Pupil Exam: NORMAL ACCOMODATION, PERRL





- ENT Exam


ENT Exam: Mucous Membranes Moist, Normal Exam





- Neck Exam


Neck Exam: Full ROM, Normal Inspection.  absent: Lymphadenopathy





- Respiratory Exam


Respiratory Exam: Clear to Ausculation Bilateral, NORMAL BREATHING PATTERN





- Cardiovascular Exam


Cardiovascular Exam: REGULAR RHYTHM, +S1, +S2.  absent: Murmur





- GI/Abdominal Exam


GI & Abdominal Exam: Soft, Normal Bowel Sounds.  absent: Tenderness





- Extremities Exam


Extremities Exam: Full ROM, Normal Capillary Refill, Normal Inspection.  absent

: Joint Swelling, Pedal Edema





- Back Exam


Back Exam: NORMAL INSPECTION





- Neurological Exam


Neurological Exam: Alert, Awake, CN II-XII Intact, Normal Gait, Oriented x3





- Psychiatric Exam


Psychiatric exam: Normal Affect, Normal Mood





- Skin


Skin Exam: Dry, Intact, Normal Color, Warm





Assessment and Plan


(1) DVT prophylaxis


Status: Acute





(2) CAD (coronary artery disease)


Status: Acute





(3) Smoking


Status: Chronic





(4) Low back pain


Status: Chronic





(5) Agitation


Status: Acute





(6) Scrotal edema


Status: Chronic





(7) Prediabetes


Status: Acute








- Assessment and Plan (Free Text)


Assessment: 


(1) DVT prophylaxis


Assessment & Plan: 


scd and ae hose


ambulation


Status: Acute





(2) CAD (coronary artery disease)


Assessment & Plan: 


con tmeds


Status: Acute





(3) Smoking


Assessment & Plan: 


nicoderm


Status: Chronic





(4) Low back pain


Assessment & Plan: 


ibu profen prn


Status: Chronic





(5) Agitation


Assessment & Plan: 


r/t neurocognitive d/ol


sont meds


Status: Acute





(6) Scrotal edema


Status: Chronic





(7) Prediabetes


Assessment & Plan: 


dietary control


Status: Acute

## 2016-07-22 RX ADMIN — GUAIFENESIN AND DEXTROMETHORPHAN HYDROBROMIDE SCH TAB: 600; 30 TABLET, EXTENDED RELEASE ORAL at 16:26

## 2016-07-22 RX ADMIN — DIVALPROEX SODIUM SCH MG: 250 TABLET, DELAYED RELEASE ORAL at 08:53

## 2016-07-22 RX ADMIN — DIVALPROEX SODIUM SCH MG: 250 TABLET, DELAYED RELEASE ORAL at 16:26

## 2016-07-22 RX ADMIN — GUAIFENESIN AND DEXTROMETHORPHAN HYDROBROMIDE SCH TAB: 600; 30 TABLET, EXTENDED RELEASE ORAL at 08:58

## 2016-07-22 NOTE — CP.PCM.PN
Subjective





- Date & Time of Evaluation


Date of Evaluation: 07/22/16


Time of Evaluation: 08:38





- Subjective


Subjective: 


no f/c, n/v/d


calm adn cooperativ


germain distress/complaints





Objective





- Vital Signs/Intake and Output


Vital Signs (last 24 hours): 


 











Temp Pulse Resp BP Pulse Ox


 


 97.2 F L  70   20   113/76   100 


 


 07/22/16 07:44  07/22/16 07:44  07/22/16 07:44  07/22/16 07:44  07/22/16 07:44











- Medications


Medications: 


 Current Medications





Aspirin (Ecotrin)  81 mg PO DAILY [facility]


   Last Admin: 07/21/16 09:33 Dose:  81 mg


Atorvastatin Calcium (Lipitor)  20 mg PO DAILY [facility]


   Last Admin: 07/21/16 09:34 Dose:  20 mg


Divalproex Sodium (Depakote Dr(*Bid*))  500 mg PO BID [facility]


   Last Admin: 07/21/16 16:57 Dose:  500 mg


Enalapril Maleate (Vasotec)  10 mg PO DAILY [facility]


   Last Admin: 07/21/16 09:36 Dose:  Not Given


Famotidine (Pepcid)  20 mg PO BID [facility]


   Last Admin: 07/21/16 16:57 Dose:  20 mg


Guaifenesin/Dextromethorphan (Mucinex-Dm 600-30 Mg)  1 tab PO BID [facility]


   Last Admin: 07/21/16 16:57 Dose:  1 tab


Ibuprofen (Motrin Tab)  600 mg PO Q8 PRN


   PRN Reason: Pain, moderate (4-7)


   Last Admin: 07/11/16 21:30 Dose:  600 mg


Metoprolol Tartrate (Lopressor)  50 mg PO Q12 [facility]


   Last Admin: 07/21/16 21:04 Dose:  50 mg


Nicotine (Nicoderm Cq)  1 patch TD DAILY [facility]


   Last Admin: 07/21/16 09:35 Dose:  1 patch


Quetiapine Fumarate (Seroquel)  25 mg PO HS [facility]


   Last Admin: 07/21/16 21:04 Dose:  25 mg











- Labs


Labs: 


 





 06/23/16 06:40 





 06/23/16 06:40 





 











PT  11.2 SECONDS (9.4-12.0)   12/14/15  05:20    


 


INR  1.05  (0.89-1.20)   12/14/15  05:20    


 


APTT  36.2 SECONDS (23.1-32.0)  H  12/14/15  05:20    














- Constitutional


Appears: Well, Non-toxic, No Acute Distress





- Head Exam


Head Exam: ATRAUMATIC, NORMAL INSPECTION, NORMOCEPHALIC





- Eye Exam


Eye Exam: EOMI, Normal appearance, PERRL


Pupil Exam: NORMAL ACCOMODATION, PERRL





- ENT Exam


ENT Exam: Mucous Membranes Moist, Normal Exam





- Neck Exam


Neck Exam: Full ROM, Normal Inspection.  absent: Lymphadenopathy





- Respiratory Exam


Respiratory Exam: Clear to Ausculation Bilateral, NORMAL BREATHING PATTERN





- Cardiovascular Exam


Cardiovascular Exam: REGULAR RHYTHM, +S1, +S2.  absent: Murmur





- GI/Abdominal Exam


GI & Abdominal Exam: Soft, Normal Bowel Sounds.  absent: Tenderness





- Extremities Exam


Extremities Exam: Full ROM, Normal Capillary Refill, Normal Inspection.  absent

: Joint Swelling, Pedal Edema





- Back Exam


Back Exam: NORMAL INSPECTION





- Neurological Exam


Neurological Exam: Alert, Awake, CN II-XII Intact, Normal Gait, Oriented x3





- Psychiatric Exam


Psychiatric exam: Normal Affect, Normal Mood





- Skin


Skin Exam: Dry, Intact, Normal Color, Warm





Assessment and Plan


(1) DVT prophylaxis


Status: Acute





(2) CAD (coronary artery disease)


Status: Acute





(3) Smoking


Status: Chronic





(4) Low back pain


Status: Chronic





(5) Agitation


Status: Acute





(6) Scrotal edema


Status: Chronic





(7) Prediabetes


Status: Acute








- Assessment and Plan (Free Text)


Assessment: 


(1) DVT prophylaxis


Assessment & Plan: 


scd and ae hose


ambulation


Status: Acute





(2) CAD (coronary artery disease)


Assessment & Plan: 


con tmeds


Status: Acute





(3) Smoking


Assessment & Plan: 


nicoderm


Status: Chronic





(4) Low back pain


Assessment & Plan: 


ibu profen prn


Status: Chronic





(5) Agitation


Assessment & Plan: 


r/t neurocognitive d/ol


sont meds


Status: Acute





(6) Scrotal edema


Status: Chronic





(7) Prediabetes


Assessment & Plan: 


dietary control


Status: Acute

## 2016-07-23 RX ADMIN — DIVALPROEX SODIUM SCH MG: 250 TABLET, DELAYED RELEASE ORAL at 16:44

## 2016-07-23 RX ADMIN — DIVALPROEX SODIUM SCH MG: 250 TABLET, DELAYED RELEASE ORAL at 09:18

## 2016-07-23 RX ADMIN — GUAIFENESIN AND DEXTROMETHORPHAN HYDROBROMIDE SCH TAB: 600; 30 TABLET, EXTENDED RELEASE ORAL at 16:44

## 2016-07-23 RX ADMIN — GUAIFENESIN AND DEXTROMETHORPHAN HYDROBROMIDE SCH TAB: 600; 30 TABLET, EXTENDED RELEASE ORAL at 09:30

## 2016-07-23 NOTE — CP.PCM.PN
Subjective





- Date & Time of Evaluation


Date of Evaluation: 07/23/16


Time of Evaluation: 14:25





- Subjective


Subjective: 


comfortable/cooperative.


no distress/complaitns


no f/c, n/v/d


penigng guardianship





Objective





- Vital Signs/Intake and Output


Vital Signs (last 24 hours): 


 











Temp Pulse Resp BP Pulse Ox


 


 97.3 F L  6 L  18   111/66   99 


 


 07/23/16 09:00  07/23/16 09:20  07/23/16 07:36  07/23/16 09:20  07/23/16 07:36











- Medications


Medications: 


 Current Medications





Aspirin (Ecotrin)  81 mg PO DAILY [facility]


   Last Admin: 07/23/16 09:18 Dose:  81 mg


Atorvastatin Calcium (Lipitor)  20 mg PO DAILY [facility]


   Last Admin: 07/23/16 09:19 Dose:  20 mg


Divalproex Sodium (Depakote Dr(*Bid*))  500 mg PO BID [facility]


   Last Admin: 07/23/16 09:18 Dose:  500 mg


Enalapril Maleate (Vasotec)  10 mg PO DAILY [facility]


   Last Admin: 07/23/16 09:23 Dose:  10 mg


Famotidine (Pepcid)  20 mg PO BID [facility]


   Last Admin: 07/23/16 09:23 Dose:  20 mg


Guaifenesin/Dextromethorphan (Mucinex-Dm 600-30 Mg)  1 tab PO BID [facility]


   Last Admin: 07/23/16 09:30 Dose:  1 tab


Ibuprofen (Motrin Tab)  600 mg PO Q8 PRN


   PRN Reason: Pain, moderate (4-7)


   Last Admin: 07/11/16 21:30 Dose:  600 mg


Metoprolol Tartrate (Lopressor)  50 mg PO Q12 [facility]


   Last Admin: 07/23/16 09:20 Dose:  50 mg


Nicotine (Nicoderm Cq)  1 patch TD DAILY [facility]


   Last Admin: 07/23/16 09:22 Dose:  1 patch


Quetiapine Fumarate (Seroquel)  25 mg PO HS [facility]


   Last Admin: 07/22/16 21:25 Dose:  25 mg











- Labs


Labs: 


 





 06/23/16 06:40 





 06/23/16 06:40 





 











PT  11.2 SECONDS (9.4-12.0)   12/14/15  05:20    


 


INR  1.05  (0.89-1.20)   12/14/15  05:20    


 


APTT  36.2 SECONDS (23.1-32.0)  H  12/14/15  05:20    














- Constitutional


Appears: Well, Non-toxic, No Acute Distress





- Head Exam


Head Exam: ATRAUMATIC, NORMAL INSPECTION, NORMOCEPHALIC





- Eye Exam


Eye Exam: EOMI, Normal appearance, PERRL


Pupil Exam: NORMAL ACCOMODATION, PERRL





- ENT Exam


ENT Exam: Mucous Membranes Moist, Normal Exam





- Neck Exam


Neck Exam: Full ROM, Normal Inspection.  absent: Lymphadenopathy





- Respiratory Exam


Respiratory Exam: Clear to Ausculation Bilateral, NORMAL BREATHING PATTERN





- Cardiovascular Exam


Cardiovascular Exam: REGULAR RHYTHM, +S1, +S2.  absent: Murmur





- GI/Abdominal Exam


GI & Abdominal Exam: Soft, Normal Bowel Sounds.  absent: Tenderness





- Extremities Exam


Extremities Exam: Full ROM, Normal Capillary Refill, Normal Inspection.  absent

: Joint Swelling, Pedal Edema





- Back Exam


Back Exam: NORMAL INSPECTION





- Neurological Exam


Neurological Exam: Alert, Awake, CN II-XII Intact, Normal Gait, Oriented x3





- Psychiatric Exam


Psychiatric exam: Normal Affect, Normal Mood





- Skin


Skin Exam: Dry, Intact, Normal Color, Warm





Assessment and Plan


(1) DVT prophylaxis


Status: Acute





(2) CAD (coronary artery disease)


Status: Acute





(3) Smoking


Status: Chronic





(4) Low back pain


Status: Chronic





(5) Agitation


Status: Acute





(6) Scrotal edema


Status: Chronic





(7) Prediabetes


Status: Acute








- Assessment and Plan (Free Text)


Assessment: 


(1) DVT prophylaxis


Assessment & Plan: 


scd and ae hose


ambulation


Status: Acute





(2) CAD (coronary artery disease)


Assessment & Plan: 


con tmeds


Status: Acute





(3) Smoking


Assessment & Plan: 


nicoderm


Status: Chronic





(4) Low back pain


Assessment & Plan: 


ibu profen prn


Status: Chronic





(5) Agitation


Assessment & Plan: 


r/t neurocognitive d/ol


sont meds


Status: Acute





(6) Scrotal edema


Status: Chronic





(7) Prediabetes


Assessment & Plan: 


dietary control


Status: Acute

## 2016-07-24 RX ADMIN — DIVALPROEX SODIUM SCH MG: 250 TABLET, DELAYED RELEASE ORAL at 08:38

## 2016-07-24 RX ADMIN — DIVALPROEX SODIUM SCH MG: 250 TABLET, DELAYED RELEASE ORAL at 16:00

## 2016-07-24 RX ADMIN — GUAIFENESIN AND DEXTROMETHORPHAN HYDROBROMIDE SCH TAB: 600; 30 TABLET, EXTENDED RELEASE ORAL at 08:38

## 2016-07-24 NOTE — PN
DATE: 07/24/2016



The patient evaluated in bed.  No complaints of distress, fever, chills, nausea, vomiting, diarrhea. 
 The patient continues to require guardianship for placement.  Guardianship hearing is scheduled for 
7/29/2016.



REVIEW OF SYSTEMS:  Negative.  The patient is calm and cooperative at this time.



OBJECTIVE:

GENERAL:  Alert and oriented to baseline.

HEART:  Regular rate and rhythm.  No murmurs, rubs or gallops.

LUNGS:  Clear in all fields bilaterally.

ABDOMEN:  Soft, nontender.  Bowel sounds x 4.

EXTREMITIES:  Distal pulses, motor sensation intact.  Cap refill is brisk.



DIAGNOSES AND PLAN:  Coronary artery disease, status post cardiac arrest.  Continue medications.  Beatris
tation with neurocognitive disorder.  The patient has been calm and cooperative.  P.r.n. IM injection
s have been discontinued.  Continue Seroquel and Depakote.  Deep venous thrombosis prophylaxis, seque
ntial compression device, and antiembolism hose, ambulation.  Smoking cessation, Nicoderm patch.  Chr
onic intermittent low back pain, ibuprofen p.r.n. We will continue to monitor patient closely and dis
charge with the guardianship that is established and _____ .





__________________________________________

Fan SILVER







cc:



DD: 07/24/2016 14:41:55  [id number]

TT: 07/24/2016 15:34:29

Confirmation # [id number]

Dictation # [id number]

kali

## 2016-07-24 NOTE — CP.PCM.PN
Subjective





- Date & Time of Evaluation


Date of Evaluation: 07/24/16


Time of Evaluation: 13:34





- Subjective


Subjective: 


calm an dcoopeartive. no complaitns. no f/c, n/v/d


pending guardianship 


dictated





Objective





- Vital Signs/Intake and Output


Vital Signs (last 24 hours): 


 











Temp Pulse Resp BP Pulse Ox


 


 97.5 F L  101 H  18   101/61   100 


 


 07/24/16 09:00  07/24/16 09:00  07/24/16 09:00  07/24/16 09:00  07/24/16 09:00











- Medications


Medications: 


 Current Medications





Aspirin (Ecotrin)  81 mg PO DAILY [facility]


   Last Admin: 07/24/16 08:37 Dose:  81 mg


Atorvastatin Calcium (Lipitor)  20 mg PO DAILY [facility]


   Last Admin: 07/24/16 08:37 Dose:  20 mg


Divalproex Sodium (Depakote Dr(*Bid*))  500 mg PO BID [facility]


   Last Admin: 07/24/16 08:38 Dose:  500 mg


Enalapril Maleate (Vasotec)  10 mg PO DAILY [facility]


   Last Admin: 07/24/16 08:38 Dose:  10 mg


Famotidine (Pepcid)  20 mg PO BID [facility]


   Last Admin: 07/24/16 08:37 Dose:  20 mg


Ibuprofen (Motrin Tab)  600 mg PO Q8 PRN


   PRN Reason: Pain, moderate (4-7)


   Last Admin: 07/11/16 21:30 Dose:  600 mg


Metoprolol Tartrate (Lopressor)  50 mg PO Q12 [facility]


   Last Admin: 07/24/16 08:38 Dose:  50 mg


Nicotine (Nicoderm Cq)  1 patch TD DAILY [facility]


   Last Admin: 07/23/16 09:22 Dose:  1 patch


Quetiapine Fumarate (Seroquel)  25 mg PO HS [facility]


   Last Admin: 07/23/16 21:12 Dose:  25 mg











- Labs


Labs: 


 





 06/23/16 06:40 





 06/23/16 06:40 





 











PT  11.2 SECONDS (9.4-12.0)   12/14/15  05:20    


 


INR  1.05  (0.89-1.20)   12/14/15  05:20    


 


APTT  36.2 SECONDS (23.1-32.0)  H  12/14/15  05:20    














Assessment and Plan


(1) DVT prophylaxis


Status: Acute





(2) CAD (coronary artery disease)


Status: Acute





(3) Smoking


Status: Chronic





(4) Low back pain


Status: Chronic





(5) Agitation


Status: Acute





(6) Scrotal edema


Status: Chronic





(7) Prediabetes


Status: Acute

## 2016-07-25 RX ADMIN — DIVALPROEX SODIUM SCH MG: 250 TABLET, DELAYED RELEASE ORAL at 09:11

## 2016-07-25 RX ADMIN — DIVALPROEX SODIUM SCH MG: 250 TABLET, DELAYED RELEASE ORAL at 16:15

## 2016-07-25 NOTE — CP.PCM.PN
Subjective





- Date & Time of Evaluation


Date of Evaluation: 07/25/16


Time of Evaluation: 08:15





- Subjective


Subjective: 


calm an dcoopeartive. no complaitns. no f/c, n/v/d


pending guardianship 





Objective





- Vital Signs/Intake and Output


Vital Signs (last 24 hours): 


 











Temp Pulse Resp BP Pulse Ox


 


 97.1 F L  74   20   143/78   99 


 


 07/25/16 07:41  07/25/16 07:41  07/25/16 07:41  07/25/16 07:41  07/25/16 07:41











- Medications


Medications: 


 Current Medications





Aspirin (Ecotrin)  81 mg PO DAILY [facility]


   Last Admin: 07/24/16 08:37 Dose:  81 mg


Atorvastatin Calcium (Lipitor)  20 mg PO DAILY [facility]


   Last Admin: 07/24/16 08:37 Dose:  20 mg


Divalproex Sodium (Depakote Dr(*Bid*))  500 mg PO BID [facility]


   Last Admin: 07/24/16 16:00 Dose:  500 mg


Enalapril Maleate (Vasotec)  10 mg PO DAILY [facility]


   Last Admin: 07/24/16 08:38 Dose:  10 mg


Famotidine (Pepcid)  20 mg PO BID [facility]


   Last Admin: 07/24/16 16:00 Dose:  20 mg


Ibuprofen (Motrin Tab)  600 mg PO Q8 PRN


   PRN Reason: Pain, moderate (4-7)


   Last Admin: 07/24/16 21:23 Dose:  600 mg


Metoprolol Tartrate (Lopressor)  50 mg PO Q12 [facility]


   Last Admin: 07/24/16 21:27 Dose:  50 mg


Nicotine (Nicoderm Cq)  1 patch TD DAILY [facility]


   Last Admin: 07/24/16 15:51 Dose:  1 patch


Quetiapine Fumarate (Seroquel)  25 mg PO HS [facility]


   Last Admin: 07/24/16 21:28 Dose:  25 mg











- Labs


Labs: 


 





 06/23/16 06:40 





 06/23/16 06:40 





 











PT  11.2 SECONDS (9.4-12.0)   12/14/15  05:20    


 


INR  1.05  (0.89-1.20)   12/14/15  05:20    


 


APTT  36.2 SECONDS (23.1-32.0)  H  12/14/15  05:20    














- Constitutional


Appears: Well, Non-toxic, No Acute Distress





- Head Exam


Head Exam: ATRAUMATIC, NORMAL INSPECTION, NORMOCEPHALIC





- Eye Exam


Eye Exam: EOMI, Normal appearance, PERRL


Pupil Exam: NORMAL ACCOMODATION, PERRL





- ENT Exam


ENT Exam: Mucous Membranes Moist, Normal Exam





- Neck Exam


Neck Exam: Full ROM, Normal Inspection.  absent: Lymphadenopathy





- Respiratory Exam


Respiratory Exam: Clear to Ausculation Bilateral, NORMAL BREATHING PATTERN





- Cardiovascular Exam


Cardiovascular Exam: REGULAR RHYTHM, +S1, +S2.  absent: Murmur





- GI/Abdominal Exam


GI & Abdominal Exam: Soft, Normal Bowel Sounds.  absent: Tenderness





- Extremities Exam


Extremities Exam: Full ROM, Normal Capillary Refill, Normal Inspection.  absent

: Joint Swelling, Pedal Edema





- Back Exam


Back Exam: NORMAL INSPECTION





- Neurological Exam


Neurological Exam: Alert, Awake, CN II-XII Intact, Normal Gait, Oriented x3





- Psychiatric Exam


Psychiatric exam: Normal Affect, Normal Mood





- Skin


Skin Exam: Dry, Intact, Normal Color, Warm





Assessment and Plan


(1) DVT prophylaxis


Status: Acute





(2) CAD (coronary artery disease)


Status: Acute





(3) Smoking


Status: Chronic





(4) Low back pain


Status: Chronic





(5) Agitation


Status: Acute





(6) Scrotal edema


Status: Chronic





(7) Prediabetes


Status: Acute








- Assessment and Plan (Free Text)


Assessment: 


(1) DVT prophylaxis


Assessment & Plan: 


scd and ae hose


ambulation


Status: Acute





(2) CAD (coronary artery disease)


Assessment & Plan: 


con tmeds


Status: Acute





(3) Smoking


Assessment & Plan: 


nicoderm


Status: Chronic





(4) Low back pain


Assessment & Plan: 


ibu profen prn


Status: Chronic





(5) Agitation


Assessment & Plan: 


r/t neurocognitive d/ol


sont meds


Status: Acute





(6) Scrotal edema


Status: Chronic





(7) Prediabetes


Assessment & Plan: 


dietary control


Status: Acute

## 2016-07-25 NOTE — CP.PCM.CON
History of Present Illness





- History of Present Illness


History of Present Illness: 


56 year old prediabetic male patient seen at [facility] concerning elongated 

toenails. Pt was resting comfortably at this time and in no acute distress. Pt 

denies and acute overnight events. Pt denies recent n/v/f/c/cp/sob. 





Past Patient History





- Past Medical History & Family History


Past Medical History?: Yes





- Past Social History


Smoking Status: Heavy Smoker > 10 Cigarettes Daily





- CARDIAC


Hx Cardiac Disorders: Yes





- NEUROLOGICAL


Hx Neurological Disorder: Yes (seizure)


Hx Seizures: Yes





- HEENT


Hx HEENT Problems: No





- RENAL


Hx Chronic Kidney Disease: No





- ENDOCRINE/METABOLIC


Hx Endocrine Disorders: No





- HEMATOLOGICAL/ONCOLOGICAL


Hx Blood Disorders: No





- INTEGUMENTARY


Hx Dermatological Problems: No





- MUSCULOSKELETAL/RHEUMATOLOGICAL


Hx Falls: No





- GENITOURINARY/GYNECOLOGICAL


Hx Genitourinary Disorders: No





- PSYCHIATRIC


Hx Substance Use: No





Meds


Home Medications: 


 Home Medication List











 Medication  Instructions  Recorded  Confirmed  Type


 


Aspirin [Ecotrin] 81 mg PO DAILY #0 tabec 03/21/16  Rx


 


Divalproex [Depakote DR(*BID*)] 500 mg PO BID #0 tcp 03/21/16  Rx


 


Enalapril Maleate [Vasotec] 10 mg PO DAILY #0 tab 03/21/16  Rx


 


Famotidine [Pepcid] 20 mg PO BID #0 tab 03/21/16  Rx


 


Ibuprofen [Motrin Tab] 600 mg PO Q8 PRN #0 tab 03/21/16  Rx


 


Metoprolol Tartrate [Lopressor] 50 mg PO Q12 #0 tab 03/21/16  Rx


 


Nicotine 21 mg/24 hr [Nicoderm Cq] 1 patch TD DAILY #0 patch 03/21/16  Rx


 


QUEtiapine [Seroquel] 25 mg PO DAILY@1700 #0 tab 03/21/16  Rx


 


levETIRAcetam [Keppra] 500 mg PO BID #0 tab 03/21/16  Rx











Allergies/Adverse Reactions: 


 Allergies











Allergy/AdvReac Type Severity Reaction Status Date / Time


 


No Known Allergies Allergy   Verified 06/02/16 00:33














- Medications


Medications: 


 Current Medications





Aspirin (Ecotrin)  81 mg PO DAILY [facility]


   Last Admin: 07/25/16 09:11 Dose:  81 mg


Atorvastatin Calcium (Lipitor)  20 mg PO DAILY [facility]


   Last Admin: 07/25/16 09:11 Dose:  20 mg


Divalproex Sodium (Depakote Dr(*Bid*))  500 mg PO BID [facility]


   Last Admin: 07/25/16 16:15 Dose:  500 mg


Enalapril Maleate (Vasotec)  10 mg PO DAILY [facility]


   Last Admin: 07/25/16 09:10 Dose:  10 mg


Famotidine (Pepcid)  20 mg PO BID [facility]


   Last Admin: 07/25/16 16:15 Dose:  20 mg


Ibuprofen (Motrin Tab)  600 mg PO Q8 PRN


   PRN Reason: Pain, moderate (4-7)


   Last Admin: 07/24/16 21:23 Dose:  600 mg


Metoprolol Tartrate (Lopressor)  50 mg PO Q12 [facility]


   Last Admin: 07/25/16 09:11 Dose:  50 mg


Nicotine (Nicoderm Cq)  1 patch TD DAILY [facility]


   Last Admin: 07/25/16 09:10 Dose:  1 patch


Quetiapine Fumarate (Seroquel)  25 mg PO HS [facility]


   Last Admin: 07/24/16 21:28 Dose:  25 mg











Physical Exam





- Constitutional


Appears: Well, Non-toxic, No Acute Distress





- Extremities Exam


Additional comments: 


Lower extremity focused. 





DERM: Rigth side @ level of ankle joint shows medial & lateral cicatrix with 

diffuse hyperpigmented patches. Elongated, thickened, discolored, toenails w/ 

subungual debris and Beau's lines noted x 10. No open lesion or interdigital 

macerations noted. Diffuse forefoot xerosis noted. 


VASC: PT pulsed palpable bilaterally, graded +2/4; however DP pulses noted 

absent. Teperature gradient runs warm to cold proximal to distal. Pedal hair 

growth absent @ level of digits. 


NEURO: Protective sensation grossly diminished 


ORTHO: Major 4 pedal muscle group's strength graded 5/5. Bony bulky prominence 

noted at level of right ankle joint. ROM at right ankle significantly  decreased

, ROM free of pain at this time. 





- Neurological Exam


Neurological exam: Alert, Oriented x3





Results





- Vital Signs


Recent Vital Signs: 


 Last Vital Signs











Temp  98.1 F   07/25/16 15:27


 


Pulse  79   07/25/16 15:27


 


Resp  20   07/25/16 15:27


 


BP  96/76 L  07/25/16 15:27


 


Pulse Ox  100   07/25/16 15:27














- Labs


Result Diagrams: 


 06/23/16 06:40





 06/23/16 06:40





Assessment & Plan





- Assessment and Plan (Free Text)


Assessment: 


56 year old male w/ dystrophic toenails x 10. 


Plan: 


Pt seen and evaluated at bedside. Chart and vitals reviewed. 


Discussed pt with attending Dr. Estes. 


Performed aseptic onychoreduction of all 10 mails without incident. 


Pt stable from podiatry standpoint. Podiatry signing off. 


Thank you for this consult. Please reconsult for this patient as needed. 





- Date & Time


Date: 07/25/16


Time: 05:20

## 2016-07-26 RX ADMIN — DIVALPROEX SODIUM SCH MG: 250 TABLET, DELAYED RELEASE ORAL at 08:13

## 2016-07-26 RX ADMIN — DIVALPROEX SODIUM SCH MG: 250 TABLET, DELAYED RELEASE ORAL at 16:03

## 2016-07-26 NOTE — CP.PCM.PN
Subjective





- Date & Time of Evaluation


Date of Evaluation: 07/26/16


Time of Evaluation: 07:28





- Subjective


Subjective: 


calm and cooperative, no distress/compalints


no f/c, n/v/d


pending guardianship haering





Objective





- Vital Signs/Intake and Output


Vital Signs (last 24 hours): 


 











Temp Pulse Resp BP Pulse Ox


 


 98.1 F   77   19   129/76   96 


 


 07/26/16 00:40  07/26/16 00:40  07/26/16 00:40  07/26/16 00:40  07/26/16 00:40











- Medications


Medications: 


 Current Medications





Aspirin (Ecotrin)  81 mg PO DAILY [facility]


   Last Admin: 07/25/16 09:11 Dose:  81 mg


Atorvastatin Calcium (Lipitor)  20 mg PO DAILY [facility]


   Last Admin: 07/25/16 09:11 Dose:  20 mg


Divalproex Sodium (Depakote Dr(*Bid*))  500 mg PO BID [facility]


   Last Admin: 07/25/16 16:15 Dose:  500 mg


Enalapril Maleate (Vasotec)  10 mg PO DAILY [facility]


   Last Admin: 07/25/16 09:10 Dose:  10 mg


Famotidine (Pepcid)  20 mg PO BID [facility]


   Last Admin: 07/25/16 16:15 Dose:  20 mg


Ibuprofen (Motrin Tab)  600 mg PO Q8 PRN


   PRN Reason: Pain, moderate (4-7)


   Last Admin: 07/24/16 21:23 Dose:  600 mg


Metoprolol Tartrate (Lopressor)  50 mg PO Q12 [facility]


   Last Admin: 07/25/16 21:08 Dose:  50 mg


Nicotine (Nicoderm Cq)  1 patch TD DAILY [facility]


   Last Admin: 07/25/16 09:10 Dose:  1 patch


Quetiapine Fumarate (Seroquel)  25 mg PO HS [facility]


   Last Admin: 07/25/16 21:09 Dose:  25 mg











- Labs


Labs: 


 





 06/23/16 06:40 





 06/23/16 06:40 





 











PT  11.2 SECONDS (9.4-12.0)   12/14/15  05:20    


 


INR  1.05  (0.89-1.20)   12/14/15  05:20    


 


APTT  36.2 SECONDS (23.1-32.0)  H  12/14/15  05:20    














- Constitutional


Appears: Well, Non-toxic, No Acute Distress





- Head Exam


Head Exam: ATRAUMATIC, NORMAL INSPECTION, NORMOCEPHALIC





- Eye Exam


Eye Exam: EOMI, Normal appearance, PERRL


Pupil Exam: NORMAL ACCOMODATION, PERRL





- ENT Exam


ENT Exam: Mucous Membranes Moist, Normal Exam





- Neck Exam


Neck Exam: Full ROM, Normal Inspection.  absent: Lymphadenopathy





- Respiratory Exam


Respiratory Exam: Clear to Ausculation Bilateral, NORMAL BREATHING PATTERN





- Cardiovascular Exam


Cardiovascular Exam: REGULAR RHYTHM, +S1, +S2.  absent: Murmur





- GI/Abdominal Exam


GI & Abdominal Exam: Soft, Normal Bowel Sounds.  absent: Tenderness





- Extremities Exam


Extremities Exam: Full ROM, Normal Capillary Refill, Normal Inspection.  absent

: Joint Swelling, Pedal Edema





- Back Exam


Back Exam: NORMAL INSPECTION





- Neurological Exam


Neurological Exam: Alert, Awake, CN II-XII Intact, Normal Gait, Oriented x3





- Psychiatric Exam


Psychiatric exam: Normal Affect, Normal Mood





- Skin


Skin Exam: Dry, Intact, Normal Color, Warm





Assessment and Plan


(1) DVT prophylaxis


Assessment & Plan: 


scd and ae hsoe


ambulation


Status: Acute





(2) CAD (coronary artery disease)


Assessment & Plan: 


s/p cardiac arrest


cont meds


Status: Acute





(3) Smoking


Assessment & Plan: 


nicoderm


Status: Chronic





(4) Low back pain


Assessment & Plan: 


ibuprofen prn


Status: Chronic





(5) Agitation


Assessment & Plan: 


r/t neurocognitive d/o


cont meds


Status: Acute





(6) Scrotal edema


Status: Chronic





(7) Prediabetes


Assessment & Plan: 


dietary control


Status: Acute

## 2016-07-27 RX ADMIN — DIVALPROEX SODIUM SCH MG: 250 TABLET, DELAYED RELEASE ORAL at 17:27

## 2016-07-27 RX ADMIN — DIVALPROEX SODIUM SCH MG: 250 TABLET, DELAYED RELEASE ORAL at 09:07

## 2016-07-27 NOTE — CP.PCM.PN
Subjective





- Date & Time of Evaluation


Date of Evaluation: 07/27/16


Time of Evaluation: 07:13





- Subjective


Subjective: 


no f/c, n/v/d


no complaitns/calm and cooperative


pending guardianship





Objective





- Vital Signs/Intake and Output


Vital Signs (last 24 hours): 


 











Temp Pulse Resp BP Pulse Ox


 


 97.4 F L  76   18   115/70   98 


 


 07/27/16 00:30  07/27/16 00:30  07/27/16 00:30  07/27/16 00:30  07/27/16 00:30











- Medications


Medications: 


 Current Medications





Aspirin (Ecotrin)  81 mg PO DAILY [facility]


   Last Admin: 07/26/16 08:13 Dose:  81 mg


Atorvastatin Calcium (Lipitor)  20 mg PO DAILY [facility]


   Last Admin: 07/26/16 08:15 Dose:  20 mg


Divalproex Sodium (Depakote Dr(*Bid*))  500 mg PO BID [facility]


   Last Admin: 07/26/16 16:03 Dose:  500 mg


Enalapril Maleate (Vasotec)  10 mg PO DAILY [facility]


   Last Admin: 07/26/16 08:15 Dose:  10 mg


Famotidine (Pepcid)  20 mg PO BID [facility]


   Last Admin: 07/26/16 16:05 Dose:  20 mg


Ibuprofen (Motrin Tab)  600 mg PO Q8 PRN


   PRN Reason: Pain, moderate (4-7)


   Last Admin: 07/24/16 21:23 Dose:  600 mg


Metoprolol Tartrate (Lopressor)  50 mg PO Q12 [facility]


   Last Admin: 07/26/16 20:44 Dose:  50 mg


Nicotine (Nicoderm Cq)  1 patch TD DAILY [facility]


   Last Admin: 07/26/16 16:04 Dose:  1 patch


Quetiapine Fumarate (Seroquel)  25 mg PO HS [facility]


   Last Admin: 07/26/16 22:15 Dose:  25 mg











- Labs


Labs: 


 





 06/23/16 06:40 





 06/23/16 06:40 





 











PT  11.2 SECONDS (9.4-12.0)   12/14/15  05:20    


 


INR  1.05  (0.89-1.20)   12/14/15  05:20    


 


APTT  36.2 SECONDS (23.1-32.0)  H  12/14/15  05:20    














- Constitutional


Appears: Well, Non-toxic, No Acute Distress





- Head Exam


Head Exam: ATRAUMATIC, NORMAL INSPECTION, NORMOCEPHALIC





- Eye Exam


Eye Exam: EOMI, Normal appearance, PERRL


Pupil Exam: NORMAL ACCOMODATION, PERRL





- ENT Exam


ENT Exam: Mucous Membranes Moist, Normal Exam





- Neck Exam


Neck Exam: Full ROM, Normal Inspection.  absent: Lymphadenopathy





- Respiratory Exam


Respiratory Exam: Clear to Ausculation Bilateral, NORMAL BREATHING PATTERN





- Cardiovascular Exam


Cardiovascular Exam: REGULAR RHYTHM, +S1, +S2.  absent: Murmur





- GI/Abdominal Exam


GI & Abdominal Exam: Soft, Normal Bowel Sounds.  absent: Tenderness





- Extremities Exam


Extremities Exam: Full ROM, Normal Capillary Refill, Normal Inspection.  absent

: Joint Swelling, Pedal Edema





- Back Exam


Back Exam: NORMAL INSPECTION





- Neurological Exam


Neurological Exam: Alert, Awake, CN II-XII Intact, Normal Gait, Oriented x3





- Psychiatric Exam


Psychiatric exam: Normal Affect, Normal Mood





- Skin


Skin Exam: Dry, Intact, Normal Color, Warm





Assessment and Plan


(1) DVT prophylaxis


Status: Acute





(2) CAD (coronary artery disease)


Status: Acute





(3) Smoking


Status: Chronic





(4) Low back pain


Status: Chronic





(5) Agitation


Status: Acute





(6) Scrotal edema


Status: Chronic





(7) Prediabetes


Status: Acute








- Assessment and Plan (Free Text)


Assessment: 


(1) DVT prophylaxis


Assessment & Plan: 


scd and ae hsoe


ambulation


Status: Acute





(2) CAD (coronary artery disease)


Assessment & Plan: 


s/p cardiac arrest


cont meds


Status: Acute





(3) Smoking


Assessment & Plan: 


nicoderm


Status: Chronic





(4) Low back pain


Assessment & Plan: 


ibuprofen prn


Status: Chronic





(5) Agitation


Assessment & Plan: 


r/t neurocognitive d/o


cont meds


Status: Acute





(6) Scrotal edema


Status: Chronic





(7) Prediabetes


Assessment & Plan: 


dietary control


Status: Acute

## 2016-07-28 RX ADMIN — DIVALPROEX SODIUM SCH MG: 250 TABLET, DELAYED RELEASE ORAL at 17:02

## 2016-07-28 RX ADMIN — DIVALPROEX SODIUM SCH MG: 250 TABLET, DELAYED RELEASE ORAL at 09:12

## 2016-07-28 NOTE — CP.PCM.PN
Subjective





- Date & Time of Evaluation


Date of Evaluation: 07/28/16


Time of Evaluation: 07:08





- Subjective


Subjective: 


calm and cooperative


no f/c, n/v/d, no distress/complaints


guardianship hearing tomorrow





Objective





- Vital Signs/Intake and Output


Vital Signs (last 24 hours): 


 











Temp Pulse Resp BP Pulse Ox


 


 97.7 F   82   20   109/73   100 


 


 07/28/16 00:14  07/28/16 00:14  07/28/16 00:14  07/28/16 00:14  07/28/16 00:14











- Medications


Medications: 


 Current Medications





Aspirin (Ecotrin)  81 mg PO DAILY [facility]


   Last Admin: 07/27/16 09:08 Dose:  81 mg


Atorvastatin Calcium (Lipitor)  20 mg PO DAILY [facility]


   Last Admin: 07/27/16 09:08 Dose:  20 mg


Divalproex Sodium (Depakote Dr(*Bid*))  500 mg PO BID [facility]


   Last Admin: 07/27/16 17:27 Dose:  500 mg


Enalapril Maleate (Vasotec)  10 mg PO DAILY [facility]


   Last Admin: 07/27/16 09:10 Dose:  10 mg


Famotidine (Pepcid)  20 mg PO BID [facility]


   Last Admin: 07/27/16 17:28 Dose:  20 mg


Ibuprofen (Motrin Tab)  600 mg PO Q8 PRN


   PRN Reason: Pain, moderate (4-7)


   Last Admin: 07/24/16 21:23 Dose:  600 mg


Metoprolol Tartrate (Lopressor)  50 mg PO Q12 [facility]


   Last Admin: 07/27/16 20:49 Dose:  50 mg


Nicotine (Nicoderm Cq)  1 patch TD DAILY [facility]


   Last Admin: 07/27/16 09:09 Dose:  1 patch


Quetiapine Fumarate (Seroquel)  25 mg PO HS [facility]


   Last Admin: 07/27/16 21:03 Dose:  25 mg











- Labs


Labs: 


 





 06/23/16 06:40 





 06/23/16 06:40 





 











PT  11.2 SECONDS (9.4-12.0)   12/14/15  05:20    


 


INR  1.05  (0.89-1.20)   12/14/15  05:20    


 


APTT  36.2 SECONDS (23.1-32.0)  H  12/14/15  05:20    














- Constitutional


Appears: Well, Non-toxic, No Acute Distress





- Head Exam


Head Exam: ATRAUMATIC, NORMAL INSPECTION, NORMOCEPHALIC





- Eye Exam


Eye Exam: EOMI, Normal appearance, PERRL


Pupil Exam: NORMAL ACCOMODATION, PERRL





- ENT Exam


ENT Exam: Mucous Membranes Moist, Normal Exam





- Neck Exam


Neck Exam: Full ROM, Normal Inspection.  absent: Lymphadenopathy





- Respiratory Exam


Respiratory Exam: Clear to Ausculation Bilateral, NORMAL BREATHING PATTERN





- Cardiovascular Exam


Cardiovascular Exam: REGULAR RHYTHM, +S1, +S2.  absent: Murmur





- GI/Abdominal Exam


GI & Abdominal Exam: Soft, Normal Bowel Sounds.  absent: Tenderness





- Extremities Exam


Extremities Exam: Full ROM, Normal Capillary Refill, Normal Inspection.  absent

: Joint Swelling, Pedal Edema





- Back Exam


Back Exam: NORMAL INSPECTION





- Neurological Exam


Neurological Exam: Alert, Awake, CN II-XII Intact, Normal Gait, Oriented x3





- Psychiatric Exam


Psychiatric exam: Normal Affect, Normal Mood





- Skin


Skin Exam: Dry, Intact, Normal Color, Warm





Assessment and Plan


(1) DVT prophylaxis


Status: Acute





(2) CAD (coronary artery disease)


Status: Acute





(3) Smoking


Status: Chronic





(4) Low back pain


Status: Chronic





(5) Agitation


Status: Acute





(6) Scrotal edema


Status: Chronic





(7) Prediabetes


Status: Acute








- Assessment and Plan (Free Text)


Assessment: 


1) DVT prophylaxis


Assessment & Plan: 


scd and ae hsoe


ambulation


Status: Acute





(2) CAD (coronary artery disease)


Assessment & Plan: 


s/p cardiac arrest


cont meds


Status: Acute





(3) Smoking


Assessment & Plan: 


nicoderm


Status: Chronic





(4) Low back pain


Assessment & Plan: 


ibuprofen prn


Status: Chronic





(5) Agitation


Assessment & Plan: 


r/t neurocognitive d/o


cont meds


Status: Acute





(6) Scrotal edema


Status: Chronic





(7) Prediabetes


Assessment & Plan: 


dietary control


Status: Acute

## 2016-07-29 RX ADMIN — DIVALPROEX SODIUM SCH MG: 250 TABLET, DELAYED RELEASE ORAL at 16:00

## 2016-07-29 RX ADMIN — DIVALPROEX SODIUM SCH MG: 250 TABLET, DELAYED RELEASE ORAL at 08:07

## 2016-07-29 NOTE — CP.PCM.PN
Subjective





- Date & Time of Evaluation


Date of Evaluation: 07/29/16


Time of Evaluation: 09:55





- Subjective


Subjective: 


pt comfortable, no distress,no complaints. no f/c, n/v/d


guardianship hearing postponed








Objective





- Vital Signs/Intake and Output


Vital Signs (last 24 hours): 


 











Temp Pulse Resp BP Pulse Ox


 


 97.3 F L  85   20   150/98 H  98 


 


 07/29/16 08:40  07/29/16 08:40  07/29/16 08:40  07/29/16 08:40  07/29/16 08:40











- Medications


Medications: 


 Current Medications





Aspirin (Ecotrin)  81 mg PO DAILY [facility]


   Last Admin: 07/29/16 08:07 Dose:  81 mg


Atorvastatin Calcium (Lipitor)  20 mg PO DAILY [facility]


   Last Admin: 07/29/16 08:07 Dose:  20 mg


Divalproex Sodium (Depakote Dr(*Bid*))  500 mg PO BID [facility]


   Last Admin: 07/29/16 08:07 Dose:  500 mg


Enalapril Maleate (Vasotec)  10 mg PO DAILY [facility]


   Last Admin: 07/29/16 08:07 Dose:  10 mg


Famotidine (Pepcid)  20 mg PO BID [facility]


   Last Admin: 07/29/16 08:07 Dose:  20 mg


Ibuprofen (Motrin Tab)  600 mg PO Q8 PRN


   PRN Reason: Pain, moderate (4-7)


   Last Admin: 07/24/16 21:23 Dose:  600 mg


Metoprolol Tartrate (Lopressor)  50 mg PO Q12 [facility]


   Last Admin: 07/29/16 08:07 Dose:  50 mg


Nicotine (Nicoderm Cq)  1 patch TD DAILY [facility]


   Last Admin: 07/29/16 08:08 Dose:  1 patch


Quetiapine Fumarate (Seroquel)  25 mg PO HS [facility]


   Last Admin: 07/28/16 21:36 Dose:  25 mg











- Labs


Labs: 


 





 06/23/16 06:40 





 06/23/16 06:40 





 











PT  11.2 SECONDS (9.4-12.0)   12/14/15  05:20    


 


INR  1.05  (0.89-1.20)   12/14/15  05:20    


 


APTT  36.2 SECONDS (23.1-32.0)  H  12/14/15  05:20    














- Constitutional


Appears: Well, Non-toxic, No Acute Distress





- Head Exam


Head Exam: ATRAUMATIC, NORMAL INSPECTION, NORMOCEPHALIC





- Eye Exam


Eye Exam: EOMI, Normal appearance, PERRL


Pupil Exam: NORMAL ACCOMODATION, PERRL





- ENT Exam


ENT Exam: Mucous Membranes Moist, Normal Exam





- Neck Exam


Neck Exam: Full ROM, Normal Inspection.  absent: Lymphadenopathy





- Respiratory Exam


Respiratory Exam: Clear to Ausculation Bilateral, NORMAL BREATHING PATTERN





- Cardiovascular Exam


Cardiovascular Exam: REGULAR RHYTHM, +S1, +S2.  absent: Murmur





- GI/Abdominal Exam


GI & Abdominal Exam: Soft, Normal Bowel Sounds.  absent: Tenderness





- Extremities Exam


Extremities Exam: Full ROM, Normal Capillary Refill, Normal Inspection.  absent

: Joint Swelling, Pedal Edema





- Back Exam


Back Exam: NORMAL INSPECTION





- Neurological Exam


Neurological Exam: Alert, Awake, CN II-XII Intact, Normal Gait, Oriented x3





- Psychiatric Exam


Psychiatric exam: Normal Affect, Normal Mood





- Skin


Skin Exam: Dry, Intact, Normal Color, Warm





Assessment and Plan


(1) DVT prophylaxis


Status: Acute





(2) CAD (coronary artery disease)


Status: Acute





(3) Smoking


Status: Chronic





(4) Low back pain


Status: Chronic





(5) Agitation


Status: Acute





(6) Scrotal edema


Status: Chronic





(7) Prediabetes


Status: Acute








- Assessment and Plan (Free Text)


Assessment: 


1) DVT prophylaxis


Assessment & Plan: 


scd and ae hsoe


ambulation


Status: Acute





(2) CAD (coronary artery disease)


Assessment & Plan: 


s/p cardiac arrest


cont meds


Status: Acute





(3) Smoking


Assessment & Plan: 


nicoderm


Status: Chronic





(4) Low back pain


Assessment & Plan: 


ibuprofen prn


Status: Chronic





(5) Agitation


Assessment & Plan: 


r/t neurocognitive d/o


cont meds


Status: Acute





(6) Scrotal edema


Status: Chronic





(7) Prediabetes


Assessment & Plan: 


dietary control


Status: Acute

## 2016-07-30 RX ADMIN — DIVALPROEX SODIUM SCH MG: 250 TABLET, DELAYED RELEASE ORAL at 17:02

## 2016-07-30 RX ADMIN — DIVALPROEX SODIUM SCH MG: 250 TABLET, DELAYED RELEASE ORAL at 08:52

## 2016-07-30 NOTE — CP.PCM.PN
Subjective





- Date & Time of Evaluation


Date of Evaluation: 07/30/16


Time of Evaluation: 10:19





- Subjective


Subjective: 


doing well, no compalints, pending guardianship


no f/c, n/v/d.





Objective





- Vital Signs/Intake and Output


Vital Signs (last 24 hours): 


 











Temp Pulse Resp BP Pulse Ox


 


 97.3 F L  68   18   149/90   99 


 


 07/30/16 08:11  07/30/16 08:53  07/30/16 08:11  07/30/16 08:53  07/30/16 08:11











- Medications


Medications: 


 Current Medications





Aspirin (Ecotrin)  [facility]ILY [facility]


   Last Admin: 07/30/16 08:53 Dose:  81 mg


Atorvastatin Calcium (Lipitor)  20 mg PO [facility]ILY [facility]


   Last Admin: 07/30/16 08:53 Dose:  20 mg


Divalproex Sodium (Depakote Dr(*Bid*))  500 mg PO BID [facility]


   Last Admin: 07/30/16 08:52 Dose:  500 mg


Enalapril Maleate (Vasotec)  10 mg PO DAILY [facility]


   Last Admin: 07/30/16 08:57 Dose:  10 mg


Famotidine (Pepcid)  20 mg PO BID [facility]


   Last Admin: 07/30/16 08:56 Dose:  20 mg


Ibuprofen (Motrin Tab)  600 mg PO Q8 PRN


   PRN Reason: Pain, moderate (4-7)


   Last Admin: 07/24/16 21:23 Dose:  600 mg


Metoprolol Tartrate (Lopressor)  50 mg PO Q12 [facility]


   Last Admin: 07/30/16 08:53 Dose:  50 mg


Nicotine (Nicoderm Cq)  1 patch TD DAILY [facility]


   Last Admin: 07/30/16 08:56 Dose:  1 patch


Quetiapine Fumarate (Seroquel)  25 mg PO HS [facility]


   Last Admin: 07/29/16 21:22 Dose:  25 mg











- Labs


Labs: 


 





 06/23/16 06:40 





 06/23/16 06:40 





 











PT  11.2 SECONDS (9.4-12.0)   12/14/15  05:20    


 


INR  1.05  (0.89-1.20)   12/14/15  05:20    


 


APTT  36.2 SECONDS (23.1-32.0)  H  12/14/15  05:20    














- Constitutional


Appears: Well, Non-toxic, No Acute Distress





- Head Exam


Head Exam: ATRAUMATIC, NORMAL INSPECTION, NORMOCEPHALIC





- Eye Exam


Eye Exam: EOMI, Normal appearance, PERRL


Pupil Exam: NORMAL ACCOMODATION, PERRL





- ENT Exam


ENT Exam: Mucous Membranes Moist, Normal Exam





- Neck Exam


Neck Exam: Full ROM, Normal Inspection.  absent: Lymphadenopathy





- Respiratory Exam


Respiratory Exam: Clear to Ausculation Bilateral, NORMAL BREATHING PATTERN





- Cardiovascular Exam


Cardiovascular Exam: REGULAR RHYTHM, +S1, +S2.  absent: Murmur





- GI/Abdominal Exam


GI & Abdominal Exam: Soft, Normal Bowel Sounds.  absent: Tenderness





- Extremities Exam


Extremities Exam: Full ROM, Normal Capillary Refill, Normal Inspection.  absent

: Joint Swelling, Pedal Edema





- Back Exam


Back Exam: NORMAL INSPECTION





- Neurological Exam


Neurological Exam: Alert, Awake, CN II-XII Intact, Normal Gait, Oriented x3





- Psychiatric Exam


Psychiatric exam: Normal Affect, Normal Mood





- Skin


Skin Exam: Dry, Intact, Normal Color, Warm





Assessment and Plan


(1) DVT prophylaxis


Status: Acute





(2) CAD (coronary artery disease)


Status: Acute





(3) Smoking


Status: Chronic





(4) Low back pain


Status: Chronic





(5) Agitation


Status: Acute





(6) Scrotal edema


Status: Chronic





(7) Prediabetes


Status: Acute








- Assessment and Plan (Free Text)


Assessment: 


1) DVT prophylaxis


Assessment & Plan: 


scd and ae hsoe


ambulation


Status: Acute





(2) CAD (coronary artery disease)


Assessment & Plan: 


s/p cardiac arrest


cont meds


Status: Acute





(3) Smoking


Assessment & Plan: 


nicoderm


Status: Chronic





(4) Low back pain


Assessment & Plan: 


ibuprofen prn


Status: Chronic





(5) Agitation


Assessment & Plan: 


r/t neurocognitive d/o


cont meds


Status: Acute





(6) Scrotal edema


Status: Chronic





(7) Prediabetes


Assessment & Plan: 


dietary control


Status: Acute

## 2016-07-31 RX ADMIN — DIVALPROEX SODIUM SCH MG: 250 TABLET, DELAYED RELEASE ORAL at 09:13

## 2016-07-31 RX ADMIN — DIVALPROEX SODIUM SCH MG: 250 TABLET, DELAYED RELEASE ORAL at 16:46

## 2016-07-31 NOTE — CP.PCM.PN
Subjective





- Date & Time of Evaluation


Date of Evaluation: 07/31/16


Time of Evaluation: 07:33





- Subjective


Subjective: 


no distress/complaints


no f/c, n/v/d


pending guardianship hearing


ros negative





Objective





- Vital Signs/Intake and Output


Vital Signs (last 24 hours): 


 











Temp Pulse Resp BP Pulse Ox


 


 97.3 F L  69   20   134/85   99 


 


 07/30/16 16:12  07/30/16 21:24  07/30/16 16:12  07/30/16 21:24  07/30/16 16:12











- Medications


Medications: 


 Current Medications





Aspirin (Ecotrin)  81 mg PO DAILY [facility]


   Last Admin: 07/30/16 08:53 Dose:  81 mg


Atorvastatin Calcium (Lipitor)  20 mg PO DAILY [facility]


   Last Admin: 07/30/16 08:53 Dose:  20 mg


Divalproex Sodium (Depakote Dr(*Bid*))  500 mg PO BID [facility]


   Last Admin: 07/30/16 17:02 Dose:  500 mg


Enalapril Maleate (Vasotec)  10 mg PO DAILY [facility]


   Last Admin: 07/30/16 08:57 Dose:  10 mg


Famotidine (Pepcid)  20 mg PO BID [facility]


   Last Admin: 07/30/16 17:02 Dose:  20 mg


Ibuprofen (Motrin Tab)  600 mg PO Q8 PRN


   PRN Reason: Pain, moderate (4-7)


   Last Admin: 07/24/16 21:23 Dose:  600 mg


Metoprolol Tartrate (Lopressor)  50 mg PO Q12 [facility]


   Last Admin: 07/30/16 21:24 Dose:  50 mg


Nicotine (Nicoderm Cq)  1 patch TD DAILY [facility]


   Last Admin: 07/30/16 08:56 Dose:  1 patch


Quetiapine Fumarate (Seroquel)  25 mg PO HS [facility]


   Last Admin: 07/30/16 21:25 Dose:  25 mg











- Labs


Labs: 


 





 06/23/16 06:40 





 06/23/16 06:40 





 











PT  11.2 SECONDS (9.4-12.0)   12/14/15  05:20    


 


INR  1.05  (0.89-1.20)   12/14/15  05:20    


 


APTT  36.2 SECONDS (23.1-32.0)  H  12/14/15  05:20    














- Constitutional


Appears: Well, Non-toxic, No Acute Distress





- Head Exam


Head Exam: ATRAUMATIC, NORMAL INSPECTION, NORMOCEPHALIC





- Eye Exam


Eye Exam: EOMI, Normal appearance, PERRL


Pupil Exam: NORMAL ACCOMODATION, PERRL





- ENT Exam


ENT Exam: Mucous Membranes Moist, Normal Exam





- Neck Exam


Neck Exam: Full ROM, Normal Inspection.  absent: Lymphadenopathy





- Respiratory Exam


Respiratory Exam: Clear to Ausculation Bilateral, NORMAL BREATHING PATTERN





- Cardiovascular Exam


Cardiovascular Exam: REGULAR RHYTHM, RRR, +S1, +S2.  absent: Murmur





- GI/Abdominal Exam


GI & Abdominal Exam: Soft, Normal Bowel Sounds.  absent: Tenderness





-  Exam


Bimanual exam: NORMAL BIMANUAL EXAM





- Extremities Exam


Extremities Exam: Full ROM, Normal Capillary Refill, Normal Inspection.  absent

: Joint Swelling, Pedal Edema





- Back Exam


Back Exam: Full ROM, NORMAL INSPECTION





- Neurological Exam


Neurological Exam: Alert, Awake, CN II-XII Intact, Normal Gait, Oriented x3





- Psychiatric Exam


Psychiatric exam: Normal Affect, Normal Mood





- Skin


Skin Exam: Dry, Intact, Normal Color, Warm





Assessment and Plan


(1) DVT prophylaxis


Assessment & Plan: 


scd and ae hose


ambulation


Status: Acute





(2) CAD (coronary artery disease)


Assessment & Plan: 


cont meds


s/p cardiac arrest


Status: Acute





(3) Smoking


Assessment & Plan: 


nicoderm


Status: Chronic





(4) Low back pain


Assessment & Plan: 


ibuprofen prn


Status: Chronic





(5) Agitation


Assessment & Plan: 


r/t neurocognitive d/o


cont meds


Status: Acute





(6) Scrotal edema


Status: Chronic





(7) Prediabetes


Assessment & Plan: 


diietary control


Status: Acute

## 2016-08-01 RX ADMIN — DIVALPROEX SODIUM SCH MG: 250 TABLET, DELAYED RELEASE ORAL at 09:23

## 2016-08-01 RX ADMIN — GUAIFENESIN AND DEXTROMETHORPHAN HYDROBROMIDE SCH TAB: 600; 30 TABLET, EXTENDED RELEASE ORAL at 22:33

## 2016-08-01 RX ADMIN — DIVALPROEX SODIUM SCH MG: 250 TABLET, DELAYED RELEASE ORAL at 16:26

## 2016-08-01 NOTE — CP.PCM.PN
Subjective





- Date & Time of Evaluation


Date of Evaluation: 08/01/16


Time of Evaluation: 07:24





- Subjective


Subjective: 


dictated note


no complaints


pending guardianship


cont meds





Objective





- Vital Signs/Intake and Output


Vital Signs (last 24 hours): 


 











Temp Pulse Resp BP Pulse Ox


 


 98.4 F   75   20   127/77   100 


 


 07/31/16 16:29  07/31/16 21:30  07/31/16 16:29  07/31/16 21:30  07/31/16 16:29











- Medications


Medications: 


 Current Medications





Aspirin (Ecotrin)  81 mg PO DAILY [facility]


   Last Admin: 07/31/16 09:13 Dose:  81 mg


Atorvastatin Calcium (Lipitor)  20 mg PO DAILY [facility]


   Last Admin: 07/31/16 09:13 Dose:  20 mg


Divalproex Sodium (Depakote Dr(*Bid*))  500 mg PO BID [facility]


   Last Admin: 07/31/16 16:46 Dose:  500 mg


Enalapril Maleate (Vasotec)  10 mg PO DAILY [facility]


   Last Admin: 07/31/16 09:00 Dose:  10 mg


Famotidine (Pepcid)  20 mg PO BID [facility]


   Last Admin: 07/31/16 16:47 Dose:  20 mg


Ibuprofen (Motrin Tab)  600 mg PO Q8 PRN


   PRN Reason: Pain, moderate (4-7)


   Last Admin: 07/24/16 21:23 Dose:  600 mg


Metoprolol Tartrate (Lopressor)  50 mg PO Q12 [facility]


   Last Admin: 07/31/16 21:30 Dose:  50 mg


Nicotine (Nicoderm Cq)  1 patch TD DAILY [facility]


   Last Admin: 07/31/16 09:14 Dose:  1 patch


Quetiapine Fumarate (Seroquel)  25 mg PO HS [facility]


   Last Admin: 07/31/16 21:30 Dose:  25 mg











- Labs


Labs: 


 





 06/23/16 06:40 





 06/23/16 06:40 





 











PT  11.2 SECONDS (9.4-12.0)   12/14/15  05:20    


 


INR  1.05  (0.89-1.20)   12/14/15  05:20    


 


APTT  36.2 SECONDS (23.1-32.0)  H  12/14/15  05:20    














Assessment and Plan


(1) DVT prophylaxis


Status: Acute





(2) CAD (coronary artery disease)


Status: Acute





(3) Smoking


Status: Chronic





(4) Low back pain


Status: Chronic





(5) Agitation


Status: Acute





(6) Scrotal edema


Status: Chronic





(7) Prediabetes


Status: Acute

## 2016-08-01 NOTE — PN
DATE: 08/01/2016



The patient is in bed, in no distress.  No complaints pending guardianship 
hearing.  No fever, chills, nausea, vomiting, or diarrhea.



REVIEW OF SYSTEMS:  As per HPI.



OBJECTIVE FINDINGS:

GENERAL:  Alert and oriented x 4.

HEART:  Regular rate and rhythm.  No murmurs, rubs or gallops.

LUNGS:  Clear in all fields was bilaterally symmetrical.

ABDOMEN:  Soft, nondistended.  Bowel sounds x 4.

EXTREMITIES:  Distal pulses, motor sensation intact.  Cap refill is brisk.



DIAGNOSES AND PLAN:  Coronary artery disease, status post cardiac arrest.  
Continue all medications.  DVT prophylaxis, SCD and AE hose.



Neurocognitive disorder cont meds. ibuprofen for low back pain.  SCD and AE 
hose for DVT prophylaxis.  We will discharge once guardianship is established 
and safe disposition is established.





__________________________________________

Fan SILVER







cc:   



DD: 08/01/2016 07:51:37  [id number]

TT: 08/01/2016 08:33:51

Confirmation # [id number]

Dictation # [id number]





08/01/2016 14:08:45

SUMI

## 2016-08-02 LAB
ALBUMIN SERPL-MCNC: 4 G/DL (ref 3.5–5)
ALBUMIN/GLOB SERPL: 0.9 {RATIO} (ref 1–2.1)
ALT SERPL-CCNC: 28 U/L (ref 21–72)
AST SERPL-CCNC: 27 U/L (ref 17–59)
BASOPHILS # BLD AUTO: 0 K/UL (ref 0–0.2)
BASOPHILS NFR BLD: 0.4 % (ref 0–2)
BUN SERPL-MCNC: 25 MG/DL (ref 9–20)
CALCIUM SERPL-MCNC: 9.8 MG/DL (ref 8.4–10.2)
CK MB SERPL-MCNC: 0.69 NG/ML (ref 0–3.38)
EOSINOPHIL # BLD AUTO: 0.5 K/UL (ref 0–0.7)
EOSINOPHIL NFR BLD: 6.3 % (ref 0–4)
ERYTHROCYTE [DISTWIDTH] IN BLOOD BY AUTOMATED COUNT: 13.6 % (ref 11.5–14.5)
GFR NON-AFRICAN AMERICAN: > 60
HGB BLD-MCNC: 12.9 G/DL (ref 12–18)
LYMPHOCYTES # BLD AUTO: 2.2 K/UL (ref 1–4.3)
LYMPHOCYTES NFR BLD AUTO: 30.4 % (ref 20–40)
MCH RBC QN AUTO: 30.6 PG (ref 27–31)
MCHC RBC AUTO-ENTMCNC: 33.5 G/DL (ref 33–37)
MCV RBC AUTO: 91.2 FL (ref 80–94)
MONOCYTES # BLD: 0.8 K/UL (ref 0–0.8)
MONOCYTES NFR BLD: 11.4 % (ref 0–10)
NEUTROPHILS # BLD: 3.7 K/UL (ref 1.8–7)
NEUTROPHILS NFR BLD AUTO: 51.5 % (ref 50–75)
NRBC BLD AUTO-RTO: 0 % (ref 0–0)
PLATELET # BLD: 199 K/UL (ref 130–400)
PMV BLD AUTO: 6.9 FL (ref 7.2–11.7)
RBC # BLD AUTO: 4.21 MIL/UL (ref 4.4–5.9)
WBC # BLD AUTO: 7.1 K/UL (ref 4.8–10.8)

## 2016-08-02 RX ADMIN — DIVALPROEX SODIUM SCH MG: 250 TABLET, DELAYED RELEASE ORAL at 17:59

## 2016-08-02 RX ADMIN — GUAIFENESIN AND DEXTROMETHORPHAN HYDROBROMIDE SCH TAB: 600; 30 TABLET, EXTENDED RELEASE ORAL at 09:36

## 2016-08-02 RX ADMIN — DIVALPROEX SODIUM SCH MG: 250 TABLET, DELAYED RELEASE ORAL at 09:33

## 2016-08-02 RX ADMIN — GUAIFENESIN AND DEXTROMETHORPHAN HYDROBROMIDE SCH TAB: 600; 30 TABLET, EXTENDED RELEASE ORAL at 18:00

## 2016-08-02 NOTE — CP.PCM.CON
History of Present Illness





- History of Present Illness


History of Present Illness: 


I was asked to see patient by Dr. Walker and Andrés LYONS.





Patient is a 56 year old male with a PMH cardaic arrest, CAD, HTN who presents 

months ago with a vfib arrest.  The patient was intubated for a prolonged 

period of time, and eventually was extubated.  He has been in the hospital 

awaiting guardianship.  He complained of chest pain which he says was 

substernal and nonradiating.  The patient was transferred to  for further 

management.





Review of Systems





- Review of Systems


Systems not reviewed;Unavailable: Altered Mental Status





Past Patient History





- Past Medical History & Family History


Past Medical History?: Yes





- Past Social History


Smoking Status: Heavy Smoker > 10 Cigarettes Daily





- CARDIAC


Hx Cardiac Disorders: Yes





- NEUROLOGICAL


Hx Neurological Disorder: Yes (seizure)


Hx Seizures: Yes





- HEENT


Hx HEENT Problems: No





- RENAL


Hx Chronic Kidney Disease: No





- ENDOCRINE/METABOLIC


Hx Endocrine Disorders: No





- HEMATOLOGICAL/ONCOLOGICAL


Hx Blood Disorders: No





- INTEGUMENTARY


Hx Dermatological Problems: No





- MUSCULOSKELETAL/RHEUMATOLOGICAL


Hx Falls: No





- GENITOURINARY/GYNECOLOGICAL


Hx Genitourinary Disorders: No





- PSYCHIATRIC


Hx Substance Use: No





Meds


Home Medications: 


 Home Medication List











 Medication  Instructions  Recorded  Confirmed  Type


 


Aspirin [Ecotrin] 81 mg PO DAILY #0 tabec 03/21/16  Rx


 


Divalproex [Depakote DR(*BID*)] 500 mg PO BID #0 tcp 03/21/16  Rx


 


Enalapril Maleate [Vasotec] 10 mg PO DAILY #0 tab 03/21/16  Rx


 


Famotidine [Pepcid] 20 mg PO BID #0 tab 03/21/16  Rx


 


Ibuprofen [Motrin Tab] 600 mg PO Q8 PRN #0 tab 03/21/16  Rx


 


Metoprolol Tartrate [Lopressor] 50 mg PO Q12 #0 tab 03/21/16  Rx


 


Nicotine 21 mg/24 hr [Nicoderm Cq] 1 patch TD DAILY #0 patch 03/21/16  Rx


 


QUEtiapine [Seroquel] 25 mg PO DAILY@1700 #0 tab 03/21/16  Rx


 


levETIRAcetam [Keppra] 500 mg PO BID #0 tab 03/21/16  Rx











Allergies/Adverse Reactions: 


 Allergies











Allergy/AdvReac Type Severity Reaction Status Date / Time


 


No Known Allergies Allergy   Verified 06/02/16 00:33














- Medications


Medications: 


 Current Medications





Aspirin (Ecotrin)  81 mg PO DAILY [facility]


   Last Admin: 08/02/16 09:33 Dose:  81 mg


Atorvastatin Calcium (Lipitor)  20 mg PO DAILY [facility]


   Last Admin: 08/02/16 09:34 Dose:  20 mg


Divalproex Sodium (Depakote Dr(*Bid*))  500 mg PO BID [facility]


   Last Admin: 08/02/16 09:33 Dose:  500 mg


Enalapril Maleate (Vasotec)  10 mg PO DAILY [facility]


   Last Admin: 08/02/16 09:36 Dose:  Not Given


Famotidine (Pepcid)  20 mg PO BID [facility]


   Last Admin: 08/02/16 09:35 Dose:  20 mg


Guaifenesin/Dextromethorphan (Mucinex-Dm 600-30 Mg)  2 tab PO BID [facility]


   Last Admin: 08/02/16 09:36 Dose:  2 tab


Ibuprofen (Motrin Tab)  600 mg PO Q8 PRN


   PRN Reason: Pain, moderate (4-7)


   Last Admin: 07/24/16 21:23 Dose:  600 mg


Metoprolol Tartrate (Lopressor)  50 mg PO Q12 [facility]


   Last Admin: 08/01/16 21:28 Dose:  50 mg


Nicotine (Nicoderm Cq)  1 patch TD DAILY [facility]


   Last Admin: 08/02/16 09:36 Dose:  1 patch


Nitroglycerin (Nitrostat Sl Tab)  0.4 mg SL Q5M PRN


   PRN Reason: cp


   Last Admin: 08/02/16 07:37 Dose:  0.4 mg


Quetiapine Fumarate (Seroquel)  25 mg PO [facility]


   Last Admin: 08/01/16 21:28 Dose:  25 mg











Physical Exam





- Constitutional


Appears: Non-toxic





- Head Exam


Head Exam: NORMAL INSPECTION





- Eye Exam


Eye Exam: Normal appearance





- ENT Exam


ENT Exam: Mucous Membranes Moist





- Neck Exam


Neck exam: Positive for: Full Rom





- Respiratory Exam


Respiratory Exam: NORMAL BREATHING PATTERN





- Cardiovascular Exam


Cardiovascular Exam: REGULAR RHYTHM





- GI/Abdominal Exam


GI & Abdominal Exam: Normal Bowel Sounds





- Rectal Exam


Rectal Exam: Deferred





- Extremities Exam


Extremities exam: Positive for: pedal pulses present





- Neurological Exam


Neurological exam: Alert





- Psychiatric Exam


Psychiatric exam: Normal Mood





- Skin


Skin Exam: Normal Color





Results





- Vital Signs


Recent Vital Signs: 


 Last Vital Signs











Temp  97.9 F   08/02/16 17:01


 


Pulse  69   08/02/16 17:01


 


Resp  18   08/02/16 17:01


 


BP  117/70   08/02/16 17:01


 


Pulse Ox  100   08/02/16 17:01














- Labs


Result Diagrams: 


 08/02/16 08:10





 08/02/16 08:10


Labs: 


 Laboratory Results - last 24 hr











  08/02/16





  08:10


 


WBC  7.1


 


RBC  4.21 L


 


Hgb  12.9


 


Hct  38.4


 


MCV  91.2


 


MCH  30.6


 


MCHC  33.5


 


RDW  13.6


 


Plt Count  199


 


MPV  6.9 L


 


Neut % (Auto)  51.5


 


Lymph % (Auto)  30.4


 


Mono % (Auto)  11.4 H


 


Eos % (Auto)  6.3 H


 


Baso % (Auto)  0.4


 


Neut #  3.7


 


Lymph #  2.2


 


Mono #  0.8


 


Eos #  0.5


 


Baso #  0.0


 


Sodium  146


 


Potassium  4.3


 


Chloride  103


 


Carbon Dioxide  27


 


Anion Gap  20


 


BUN  25 H


 


Creatinine  1.1


 


Est GFR ( Amer)  > 60


 


Est GFR (Non-Af Amer)  > 60


 


Random Glucose  99


 


Calcium  9.8


 


Total Bilirubin  0.6


 


AST  27


 


ALT  28


 


Alkaline Phosphatase  118


 


CK-MB (Mass)  0.69


 


Troponin I  < 0.0120


 


Total Protein  8.4 H


 


Albumin  4.0


 


Globulin  4.4 H


 


Albumin/Globulin Ratio  0.9 L














- EKG Data


EKG Interpreted by: Myself


EKG shows normal: Sinus rhythm





Assessment & Plan


(1) HTN (hypertension)


Status: Acute





(2) Chest pain


Assessment and Plan: 


patient likely has underlying CAD.  He does not have capacity or family for 

decision making.  Recommend check serial cardiac enzymes.  If troponin negative 

can transfer back to [facility].  will treate CAD conservatively.


Status: Acute

## 2016-08-02 NOTE — PN
DATE: 08/02/2016



The patient has a complaint of chest pain this am.  No numbness, tingling, 
weakness, shortness of breath or any other complaints.  Dr. Fisher was 
contacted and advised EKG, troponin and transfer to [facility] for 24 hours.  RN made 
aware of all.



REVIEW OF SYSTEMS:  Chest pain .



OBJECTIVE FINDINGS:

GENERAL:  Alert and oriented.

HEART:  Regular rate and rhythm.  No murmurs, rubs or gallops.

LUNGS:  Clear in all fields bilaterally.

ABDOMEN:  Soft, nontender.  Bowel sounds x 4.

EXTREMITIES:  Distal pulses, motor, sensation intact.  Cap refill is brisk.



DIAGNOSES AND PLAN:  Chest pain with history of coronary artery disease and 
cardiac arrest.  First troponin noted to be negative.  Cardiology reconsult.  
EKG is sinus rhythm with early repolarization.  We will do serial troponins and 
have cardiology reevaluate the patient.  Continue on medications.  
Neurocognitive disorder, continue on medications.  Deep venous thrombosis 
sequential compression devices and AE hose, ambulation.  Chronic intermittent 
low back pain, ibuprofen p.r.n.  Smoking cessation, Nicoderm.  Prediabetes, 
diet and exercise.  We will have cardiology re-evaluate patient, serial 
troponin and come back to med/surg when stable; pending guardianship



__________________________________________

Fan SILVER







cc:   



DD: 08/02/2016 10:55:15  [id number]

TT: 08/02/2016 11:26:03

Confirmation # [id number]

Dictation # [id number]

tn



08/02/2016 10:59:14

SUMI

## 2016-08-02 NOTE — CP.PCM.PN
Subjective





- Date & Time of Evaluation


Date of Evaluation: 08/02/16


Time of Evaluation: 07:23





- Subjective


Subjective: 


pt w/ cp no associated s/s


dr lara (cardio) contacted and recommends tele, ekg, trops


ekg wnl 


bw pending


moved to tele


dictated note #[id number]





Objective





- Vital Signs/Intake and Output


Vital Signs (last 24 hours): 


 











Temp Pulse Resp BP Pulse Ox


 


 98.8 F   74   19   112/71   100 


 


 08/02/16 01:00  08/02/16 01:00  08/02/16 01:00  08/02/16 01:00  08/02/16 01:00











- Medications


Medications: 


 Current Medications





Aspirin (Ecotrin)  81 mg PO DAILY [facility]


   Last Admin: 08/01/16 09:24 Dose:  81 mg


Atorvastatin Calcium (Lipitor)  20 mg PO DAILY [facility]


   Last Admin: 08/01/16 09:23 Dose:  20 mg


Divalproex Sodium (Depakote Dr(*Bid*))  500 mg PO BID [facility]


   Last Admin: 08/01/16 16:26 Dose:  500 mg


Enalapril Maleate (Vasotec)  10 mg PO DAILY [facility]


   Last Admin: 08/01/16 09:24 Dose:  10 mg


Famotidine (Pepcid)  20 mg PO BID [facility]


   Last Admin: 08/01/16 16:27 Dose:  20 mg


Guaifenesin/Dextromethorphan (Mucinex-Dm 600-30 Mg)  2 tab PO BID [facility]


   Last Admin: 08/01/16 22:33 Dose:  2 tab


Ibuprofen (Motrin Tab)  600 mg PO Q8 PRN


   PRN Reason: Pain, moderate (4-7)


   Last Admin: 07/24/16 21:23 Dose:  600 mg


Metoprolol Tartrate (Lopressor)  50 mg PO Q12 [facility]


   Last Admin: 08/01/16 21:28 Dose:  50 mg


Nicotine (Nicoderm Cq)  1 patch TD DAILY [facility]


   Last Admin: 08/01/16 09:23 Dose:  1 patch


Quetiapine Fumarate (Seroquel)  25 mg PO HS [facility]


   Last Admin: 08/01/16 21:28 Dose:  25 mg











- Labs


Labs: 


 





 06/23/16 06:40 





 06/23/16 06:40 





 











PT  11.2 SECONDS (9.4-12.0)   12/14/15  05:20    


 


INR  1.05  (0.89-1.20)   12/14/15  05:20    


 


APTT  36.2 SECONDS (23.1-32.0)  H  12/14/15  05:20    














Assessment and Plan


(1) DVT prophylaxis


Status: Acute





(2) CAD (coronary artery disease)


Status: Acute





(3) Smoking


Status: Chronic





(4) Low back pain


Status: Chronic





(5) Agitation


Status: Acute





(6) Scrotal edema


Status: Chronic





(7) Prediabetes


Status: Acute

## 2016-08-03 RX ADMIN — DIVALPROEX SODIUM SCH MG: 250 TABLET, DELAYED RELEASE ORAL at 09:09

## 2016-08-03 RX ADMIN — GUAIFENESIN AND DEXTROMETHORPHAN HYDROBROMIDE SCH: 600; 30 TABLET, EXTENDED RELEASE ORAL at 12:51

## 2016-08-03 RX ADMIN — DIVALPROEX SODIUM SCH: 250 TABLET, DELAYED RELEASE ORAL at 12:48

## 2016-08-03 RX ADMIN — GUAIFENESIN AND DEXTROMETHORPHAN HYDROBROMIDE SCH TAB: 600; 30 TABLET, EXTENDED RELEASE ORAL at 17:30

## 2016-08-03 RX ADMIN — DIVALPROEX SODIUM SCH MG: 250 TABLET, DELAYED RELEASE ORAL at 17:30

## 2016-08-03 RX ADMIN — GUAIFENESIN AND DEXTROMETHORPHAN HYDROBROMIDE SCH TAB: 600; 30 TABLET, EXTENDED RELEASE ORAL at 09:11

## 2016-08-03 NOTE — CARD
--------------- APPROVED REPORT --------------





EKG Measurement

Heart [id number]

[id number]

[id number]

[id number]

[id number]



&lt;Conclusion&gt;

Normal sinus rhythm

ST elevation, probably due to early repolarization

Borderline ECG

## 2016-08-03 NOTE — CP.PCM.PN
Subjective





- Date & Time of Evaluation


Date of Evaluation: 08/03/16


Time of Evaluation: 07:46





- Subjective


Subjective: 


no cp, cough/congestion or any other complaints. no f/c, n/v/d


trops negative. cleared by cardio to return to m/s


ros negative





Objective





- Vital Signs/Intake and Output


Vital Signs (last 24 hours): 


 











Temp Pulse Resp BP Pulse Ox


 


 97.5 F L  72   20   125/81   99 


 


 08/03/16 04:34  08/03/16 04:34  08/03/16 04:34  08/03/16 04:34  08/03/16 04:34











- Medications


Medications: 


 Current Medications





Aspirin (Ecotrin)  81 mg PO DAILY [facility]


   Last Admin: 08/02/16 09:33 Dose:  81 mg


Atorvastatin Calcium (Lipitor)  20 mg PO DAILY [facility]


   Last Admin: 08/02/16 09:34 Dose:  20 mg


Divalproex Sodium (Depakote Dr(*Bid*))  500 mg PO BID [facility]


   Last Admin: 08/02/16 17:59 Dose:  500 mg


Enalapril Maleate (Vasotec)  10 mg PO DAILY [facility]


   Last Admin: 08/02/16 09:36 Dose:  Not Given


Famotidine (Pepcid)  20 mg PO BID [facility]


   Last Admin: 08/02/16 18:00 Dose:  20 mg


Guaifenesin/Dextromethorphan (Mucinex-Dm 600-30 Mg)  2 tab PO BID [facility]


   Last Admin: 08/02/16 18:00 Dose:  2 tab


Ibuprofen (Motrin Tab)  600 mg PO Q8 PRN


   PRN Reason: Pain, moderate (4-7)


   Last Admin: 07/24/16 21:23 Dose:  600 mg


Metoprolol Tartrate (Lopressor)  50 mg PO Q12 [facility]


   Last Admin: 08/02/16 21:51 Dose:  50 mg


Nicotine (Nicoderm Cq)  1 patch TD DAILY [facility]


   Last Admin: 08/02/16 09:36 Dose:  1 patch


Nitroglycerin (Nitrostat Sl Tab)  0.4 mg SL Q5M PRN


   PRN Reason: cp


   Last Admin: 08/02/16 07:37 Dose:  0.4 mg


Quetiapine Fumarate (Seroquel)  25 mg PO HS [facility]


   Last Admin: 08/02/16 21:49 Dose:  25 mg











- Labs


Labs: 


 





 08/02/16 08:10 





 08/02/16 08:10 





 











PT  11.2 SECONDS (9.4-12.0)   12/14/15  05:20    


 


INR  1.05  (0.89-1.20)   12/14/15  05:20    


 


APTT  36.2 SECONDS (23.1-32.0)  H  12/14/15  05:20    














- Constitutional


Appears: Well, Non-toxic, No Acute Distress





- Head Exam


Head Exam: ATRAUMATIC, NORMAL INSPECTION, NORMOCEPHALIC





- Eye Exam


Eye Exam: EOMI, Normal appearance, PERRL


Pupil Exam: NORMAL ACCOMODATION, PERRL





- ENT Exam


ENT Exam: Mucous Membranes Moist, Normal Exam





- Neck Exam


Neck Exam: Full ROM, Normal Inspection.  absent: Lymphadenopathy





- Respiratory Exam


Respiratory Exam: Clear to Ausculation Bilateral, NORMAL BREATHING PATTERN





- Cardiovascular Exam


Cardiovascular Exam: REGULAR RHYTHM, +S1, +S2.  absent: Murmur





- GI/Abdominal Exam


GI & Abdominal Exam: Soft, Normal Bowel Sounds.  absent: Tenderness





- Extremities Exam


Extremities Exam: Full ROM, Normal Capillary Refill, Normal Inspection.  absent

: Joint Swelling, Pedal Edema





- Back Exam


Back Exam: NORMAL INSPECTION





- Neurological Exam


Neurological Exam: Alert, Awake, CN II-XII Intact, Normal Gait, Oriented x3





- Psychiatric Exam


Psychiatric exam: Normal Affect, Normal Mood





- Skin


Skin Exam: Dry, Intact, Normal Color, Warm





Assessment and Plan


(1) DVT prophylaxis


Assessment & Plan: 


scd and ae hose


ambulation


Status: Acute





(2) CAD (coronary artery disease)


Assessment & Plan: 


cleared by cardio


cont meds





Status: Acute





(3) Smoking


Assessment & Plan: 


nicoderm


Status: Chronic





(4) Low back pain


Assessment & Plan: 


ibuprofen prn


Status: Chronic





(5) Agitation


Assessment & Plan: 


r/t neurocognitive d/o


cont meds


Status: Acute





(6) Scrotal edema


Status: Chronic





(7) Prediabetes


Assessment & Plan: 


dietary control


Status: Acute








- Assessment and Plan (Free Text)


Assessment: 


pending guardianship/placement

## 2016-08-04 RX ADMIN — GUAIFENESIN AND DEXTROMETHORPHAN HYDROBROMIDE SCH TAB: 600; 30 TABLET, EXTENDED RELEASE ORAL at 09:17

## 2016-08-04 RX ADMIN — GUAIFENESIN AND DEXTROMETHORPHAN HYDROBROMIDE SCH: 600; 30 TABLET, EXTENDED RELEASE ORAL at 17:20

## 2016-08-04 RX ADMIN — DIVALPROEX SODIUM SCH MG: 250 TABLET, DELAYED RELEASE ORAL at 09:15

## 2016-08-04 RX ADMIN — DIVALPROEX SODIUM SCH MG: 250 TABLET, DELAYED RELEASE ORAL at 17:20

## 2016-08-04 RX ADMIN — GUAIFENESIN AND DEXTROMETHORPHAN HYDROBROMIDE SCH: 600; 30 TABLET, EXTENDED RELEASE ORAL at 09:23

## 2016-08-04 NOTE — CP.PCM.PN
Subjective





- Date & Time of Evaluation


Date of Evaluation: 08/04/16


Time of Evaluation: 07:28





- Subjective


Subjective: 


doign well, no complaints/distress. 


no f/c, n/v/d


pending guardianship/placement





Objective





- Vital Signs/Intake and Output


Vital Signs (last 24 hours): 


 











Temp Pulse Resp BP Pulse Ox


 


 97.3 F L  66   20   110/68   99 


 


 08/04/16 07:27  08/04/16 07:27  08/04/16 07:27  08/04/16 07:27  08/04/16 07:27











- Medications


Medications: 


 Current Medications





Aspirin (Ecotrin)  81 mg PO DAILY [facility]


   Last Admin: 08/03/16 12:49 Dose:  Not Given


Atorvastatin Calcium (Lipitor)  20 mg PO DAILY [facility]


   Last Admin: 08/03/16 12:50 Dose:  Not Given


Divalproex Sodium (Depakote Dr(*Bid*))  500 mg PO BID [facility]


   Last Admin: 08/03/16 17:30 Dose:  500 mg


Enalapril Maleate (Vasotec)  10 mg PO DAILY [facility]


   Last Admin: 08/03/16 12:50 Dose:  Not Given


Famotidine (Pepcid)  20 mg PO BID [facility]


   Last Admin: 08/03/16 17:30 Dose:  20 mg


Guaifenesin/Dextromethorphan (Mucinex-Dm 600-30 Mg)  2 tab PO BID [facility]


   Last Admin: 08/03/16 17:30 Dose:  2 tab


Ibuprofen (Motrin Tab)  600 mg PO Q8 PRN


   PRN Reason: Pain, moderate (4-7)


   Last Admin: 07/24/16 21:23 Dose:  600 mg


Metoprolol Tartrate (Lopressor)  50 mg PO Q12 [facility]


   Last Admin: 08/03/16 22:00 Dose:  50 mg


Nicotine (Nicoderm Cq)  1 patch TD DAILY [facility]


   Last Admin: 08/03/16 09:11 Dose:  1 patch


Nitroglycerin (Nitrostat Sl Tab)  0.4 mg SL Q5M PRN


   PRN Reason: cp


   Last Admin: 08/02/16 07:37 Dose:  0.4 mg


Quetiapine Fumarate (Seroquel)  25 mg PO HS [facility]


   Last Admin: 08/03/16 21:52 Dose:  25 mg











- Labs


Labs: 


 





 08/02/16 08:10 





 08/02/16 08:10 





 











PT  11.2 SECONDS (9.4-12.0)   12/14/15  05:20    


 


INR  1.05  (0.89-1.20)   12/14/15  05:20    


 


APTT  36.2 SECONDS (23.1-32.0)  H  12/14/15  05:20    














- Constitutional


Appears: Well, Non-toxic, No Acute Distress





- Head Exam


Head Exam: ATRAUMATIC, NORMAL INSPECTION, NORMOCEPHALIC





- Eye Exam


Eye Exam: EOMI, Normal appearance, PERRL


Pupil Exam: NORMAL ACCOMODATION, PERRL





- ENT Exam


ENT Exam: Mucous Membranes Moist, Normal Exam





- Neck Exam


Neck Exam: Full ROM, Normal Inspection.  absent: Lymphadenopathy





- Respiratory Exam


Respiratory Exam: Clear to Ausculation Bilateral, NORMAL BREATHING PATTERN





- Cardiovascular Exam


Cardiovascular Exam: REGULAR RHYTHM, RRR, +S1, +S2.  absent: Murmur





- GI/Abdominal Exam


GI & Abdominal Exam: Soft, Normal Bowel Sounds.  absent: Tenderness





- Extremities Exam


Extremities Exam: Full ROM, Normal Capillary Refill, Normal Inspection.  absent

: Joint Swelling, Pedal Edema





- Back Exam


Back Exam: Full ROM, NORMAL INSPECTION





- Neurological Exam


Neurological Exam: Alert, Awake, CN II-XII Intact, Normal Gait, Oriented x3





- Psychiatric Exam


Psychiatric exam: Normal Affect, Normal Mood





- Skin


Skin Exam: Dry, Intact, Normal Color, Warm





Assessment and Plan


(1) DVT prophylaxis


Assessment & Plan: 


scd and ae hose


Status: Acute





(2) CAD (coronary artery disease)


Assessment & Plan: 


s/p cad


cont meds


cardio


Status: Acute





(3) Smoking


Assessment & Plan: 


nicoderm


Status: Chronic





(4) Low back pain


Assessment & Plan: 


ibuprofen prn


Status: Chronic





(5) Agitation


Assessment & Plan: 


r/t neurocognitive d/o


cont meds


Status: Acute





(6) Scrotal edema


Status: Chronic





(7) Prediabetes


Assessment & Plan: 


dietary control


Status: Acute

## 2016-08-05 RX ADMIN — DIVALPROEX SODIUM SCH MG: 250 TABLET, DELAYED RELEASE ORAL at 16:23

## 2016-08-05 RX ADMIN — GUAIFENESIN AND DEXTROMETHORPHAN HYDROBROMIDE SCH TAB: 600; 30 TABLET, EXTENDED RELEASE ORAL at 16:23

## 2016-08-05 RX ADMIN — GUAIFENESIN AND DEXTROMETHORPHAN HYDROBROMIDE SCH: 600; 30 TABLET, EXTENDED RELEASE ORAL at 08:14

## 2016-08-05 RX ADMIN — DIVALPROEX SODIUM SCH MG: 250 TABLET, DELAYED RELEASE ORAL at 08:09

## 2016-08-05 NOTE — CP.PCM.PN
Subjective





- Date & Time of Evaluation


Date of Evaluation: 08/05/16


Time of Evaluation: 09:07





- Subjective


Subjective: 


doing well, no distress/complaints.  no f/c, n/v/d


pending guardianship





Objective





- Vital Signs/Intake and Output


Vital Signs (last 24 hours): 


 











Temp Pulse Resp BP Pulse Ox


 


 97.1 F L  65   20   107/63   99 


 


 08/05/16 08:03  08/05/16 08:03  08/05/16 08:03  08/05/16 08:03  08/05/16 08:03











- Medications


Medications: 


 Current Medications





Aspirin (Ecotrin)  81 mg PO DAILY [facility]


   Last Admin: 08/05/16 08:10 Dose:  81 mg


Atorvastatin Calcium (Lipitor)  20 mg PO DAILY [facility]


   Last Admin: 08/05/16 08:10 Dose:  20 mg


Divalproex Sodium (Depakote Dr(*Bid*))  500 mg PO BID [facility]


   Last Admin: 08/05/16 08:09 Dose:  500 mg


Enalapril Maleate (Vasotec)  10 mg PO DAILY [facility]


   Last Admin: 08/05/16 08:12 Dose:  10 mg


Famotidine (Pepcid)  20 mg PO BID [facility]


   Last Admin: 08/05/16 08:12 Dose:  20 mg


Guaifenesin/Dextromethorphan (Mucinex-Dm 600-30 Mg)  2 tab PO BID [facility]


   Last Admin: 08/05/16 08:14 Dose:  Not Given


Ibuprofen (Motrin Tab)  600 mg PO Q8 PRN


   PRN Reason: Pain, moderate (4-7)


   Last Admin: 07/24/16 21:23 Dose:  600 mg


Metoprolol Tartrate (Lopressor)  50 mg PO Q12 [facility]


   Last Admin: 08/05/16 08:10 Dose:  50 mg


Nicotine (Nicoderm Cq)  1 patch TD DAILY [facility]


   Last Admin: 08/05/16 08:11 Dose:  1 patch


Nitroglycerin (Nitrostat Sl Tab)  0.4 mg SL Q5M PRN


   PRN Reason: cp


   Last Admin: 08/02/16 07:37 Dose:  0.4 mg


Quetiapine Fumarate (Seroquel)  25 mg PO HS [facility]


   Last Admin: 08/04/16 21:25 Dose:  25 mg











- Labs


Labs: 


 





 08/02/16 08:10 





 08/02/16 08:10 





 











PT  11.2 SECONDS (9.4-12.0)   12/14/15  05:20    


 


INR  1.05  (0.89-1.20)   12/14/15  05:20    


 


APTT  36.2 SECONDS (23.1-32.0)  H  12/14/15  05:20    














- Constitutional


Appears: Well, Non-toxic, No Acute Distress





- Head Exam


Head Exam: ATRAUMATIC, NORMAL INSPECTION, NORMOCEPHALIC





- Eye Exam


Eye Exam: EOMI, Normal appearance, PERRL


Pupil Exam: NORMAL ACCOMODATION, PERRL





- ENT Exam


ENT Exam: Mucous Membranes Moist, Normal Exam





- Neck Exam


Neck Exam: Full ROM, Normal Inspection.  absent: Lymphadenopathy





- Respiratory Exam


Respiratory Exam: Clear to Ausculation Bilateral, NORMAL BREATHING PATTERN





- Cardiovascular Exam


Cardiovascular Exam: REGULAR RHYTHM, RRR, +S1, +S2.  absent: Murmur





- GI/Abdominal Exam


GI & Abdominal Exam: Soft, Normal Bowel Sounds.  absent: Tenderness





- Extremities Exam


Extremities Exam: Full ROM, Normal Capillary Refill, Normal Inspection.  absent

: Joint Swelling, Pedal Edema





- Back Exam


Back Exam: Full ROM, NORMAL INSPECTION





- Neurological Exam


Neurological Exam: Alert, Awake, CN II-XII Intact, Normal Gait, Oriented x3





- Psychiatric Exam


Psychiatric exam: Normal Affect, Normal Mood





- Skin


Skin Exam: Dry, Intact, Normal Color, Warm





Assessment and Plan


(1) DVT prophylaxis


Status: Acute





(2) CAD (coronary artery disease)


Status: Acute





(3) Smoking


Status: Chronic





(4) Low back pain


Status: Chronic





(5) Agitation


Status: Acute





(6) Scrotal edema


Status: Chronic





(7) Prediabetes


Status: Acute








- Assessment and Plan (Free Text)


Assessment: 


(1) DVT prophylaxis


Assessment & Plan: 


scd and ae hose


ambulation


Status: Acute





(2) CAD (coronary artery disease)


Assessment & Plan: 


cont meds


s/p cardiac arrest


Status: Acute





(3) Smoking


Assessment & Plan: 


nicoderm


Status: Chronic





(4) Low back pain


Assessment & Plan: 


ibuprofen prn


Status: Chronic





(5) Agitation


Assessment & Plan: 


r/t neurocognitive d/o


cont meds


Status: Acute





(6) Scrotal edema


Status: Chronic





(7) Prediabetes


Assessment & Plan: 


diietary control


Status: Acute

## 2016-08-06 RX ADMIN — GUAIFENESIN AND DEXTROMETHORPHAN HYDROBROMIDE SCH TAB: 600; 30 TABLET, EXTENDED RELEASE ORAL at 17:22

## 2016-08-06 RX ADMIN — DIVALPROEX SODIUM SCH MG: 250 TABLET, DELAYED RELEASE ORAL at 08:29

## 2016-08-06 RX ADMIN — GUAIFENESIN AND DEXTROMETHORPHAN HYDROBROMIDE SCH TAB: 600; 30 TABLET, EXTENDED RELEASE ORAL at 08:30

## 2016-08-06 RX ADMIN — DIVALPROEX SODIUM SCH MG: 250 TABLET, DELAYED RELEASE ORAL at 17:22

## 2016-08-06 NOTE — CP.PCM.PN
Subjective





- Date & Time of Evaluation


Date of Evaluation: 08/06/16


Time of Evaluation: 15:19





- Subjective


Subjective: 


calm and cooperative. no distress. no complaints. no f/c, n/v/d


penidng guardianship





Objective





- Vital Signs/Intake and Output


Vital Signs (last 24 hours): 


 











Temp Pulse Resp BP Pulse Ox


 


 98.1 F   65   20   95/59 L  97 


 


 08/06/16 07:46  08/06/16 08:28  08/06/16 07:46  08/06/16 08:28  08/06/16 07:46











- Medications


Medications: 


 Current Medications





Aspirin (Ecotrin)  81 mg PO DAILY [facility]


   Last Admin: 08/06/16 08:30 Dose:  81 mg


Atorvastatin Calcium (Lipitor)  20 mg PO DAILY [facility]


   Last Admin: 08/06/16 08:31 Dose:  20 mg


Divalproex Sodium (Depakote Dr(*Bid*))  500 mg PO BID [facility]


   Last Admin: 08/06/16 08:29 Dose:  500 mg


Enalapril Maleate (Vasotec)  10 mg PO DAILY [facility]


   Last Admin: 08/06/16 08:30 Dose:  10 mg


Famotidine (Pepcid)  20 mg PO BID [facility]


   Last Admin: 08/06/16 08:29 Dose:  20 mg


Guaifenesin/Dextromethorphan (Mucinex-Dm 600-30 Mg)  2 tab PO BID [facility]


   Last Admin: 08/06/16 08:30 Dose:  2 tab


Ibuprofen (Motrin Tab)  600 mg PO Q8 PRN


   PRN Reason: Pain, moderate (4-7)


   Last Admin: 07/24/16 21:23 Dose:  600 mg


Metoprolol Tartrate (Lopressor)  50 mg PO Q12 [facility]


   Last Admin: 08/06/16 08:28 Dose:  Not Given


Nicotine (Nicoderm Cq)  1 patch TD DAILY [facility]


   Last Admin: 08/05/16 08:11 Dose:  1 patch


Nitroglycerin (Nitrostat Sl Tab)  0.4 mg SL Q5M PRN


   PRN Reason: cp


   Last Admin: 08/02/16 07:37 Dose:  0.4 mg


Quetiapine Fumarate (Seroquel)  25 mg PO HS [facility]


   Last Admin: 08/05/16 21:01 Dose:  25 mg











- Labs


Labs: 


 





 08/02/16 08:10 





 08/02/16 08:10 





 











PT  11.2 SECONDS (9.4-12.0)   12/14/15  05:20    


 


INR  1.05  (0.89-1.20)   12/14/15  05:20    


 


APTT  36.2 SECONDS (23.1-32.0)  H  12/14/15  05:20    














- Constitutional


Appears: Well, Non-toxic, No Acute Distress





- Head Exam


Head Exam: ATRAUMATIC, NORMAL INSPECTION, NORMOCEPHALIC





- Eye Exam


Eye Exam: EOMI, Normal appearance, PERRL


Pupil Exam: NORMAL ACCOMODATION, PERRL





- ENT Exam


ENT Exam: Mucous Membranes Moist, Normal Exam





- Neck Exam


Neck Exam: Full ROM, Normal Inspection.  absent: Lymphadenopathy





- Respiratory Exam


Respiratory Exam: Clear to Ausculation Bilateral, NORMAL BREATHING PATTERN





- Cardiovascular Exam


Cardiovascular Exam: REGULAR RHYTHM, +S1, +S2.  absent: Murmur





- GI/Abdominal Exam


GI & Abdominal Exam: Soft, Normal Bowel Sounds.  absent: Tenderness





- Extremities Exam


Extremities Exam: Full ROM, Normal Capillary Refill, Normal Inspection.  absent

: Joint Swelling, Pedal Edema





- Back Exam


Back Exam: NORMAL INSPECTION





- Neurological Exam


Neurological Exam: Alert, Awake, CN II-XII Intact, Normal Gait, Oriented x3





- Psychiatric Exam


Psychiatric exam: Normal Affect, Normal Mood





- Skin


Skin Exam: Dry, Intact, Normal Color, Warm





Assessment and Plan


(1) DVT prophylaxis


Status: Acute





(2) CAD (coronary artery disease)


Status: Acute





(3) Smoking


Status: Chronic





(4) Low back pain


Status: Chronic





(5) Agitation


Status: Acute





(6) Scrotal edema


Status: Chronic





(7) Prediabetes


Status: Acute








- Assessment and Plan (Free Text)


Assessment: 


(1) DVT prophylaxis


Assessment & Plan: 


scd and ae hose


ambulation


Status: Acute





(2) CAD (coronary artery disease)


Assessment & Plan: 


cont meds


s/p cardiac arrest


Status: Acute





(3) Smoking


Assessment & Plan: 


nicoderm


Status: Chronic





(4) Low back pain


Assessment & Plan: 


ibuprofen prn


Status: Chronic





(5) Agitation


Assessment & Plan: 


r/t neurocognitive d/o


cont meds


Status: Acute





(6) Scrotal edema


Status: Chronic





(7) Prediabetes


Assessment & Plan: 


diietary control


Status: Acute

## 2016-08-07 RX ADMIN — DIVALPROEX SODIUM SCH MG: 250 TABLET, DELAYED RELEASE ORAL at 16:36

## 2016-08-07 RX ADMIN — DIVALPROEX SODIUM SCH MG: 250 TABLET, DELAYED RELEASE ORAL at 09:10

## 2016-08-07 RX ADMIN — GUAIFENESIN AND DEXTROMETHORPHAN HYDROBROMIDE SCH TAB: 600; 30 TABLET, EXTENDED RELEASE ORAL at 09:09

## 2016-08-07 RX ADMIN — GUAIFENESIN AND DEXTROMETHORPHAN HYDROBROMIDE SCH TAB: 600; 30 TABLET, EXTENDED RELEASE ORAL at 18:48

## 2016-08-07 NOTE — CP.PCM.PN
Subjective





- Date & Time of Evaluation


Date of Evaluation: 08/07/16


Time of Evaluation: 17:15





- Subjective


Subjective: 


calm/cooperative


no diistress/complaints


no f/c/n/v/d


ros negative


[pending guardianship





Objective





- Vital Signs/Intake and Output


Vital Signs (last 24 hours): 


 











Temp Pulse Resp BP Pulse Ox


 


 98.1 F   80   18   117/59 L  97 


 


 08/07/16 16:00  08/07/16 16:00  08/07/16 16:00  08/07/16 16:00  08/07/16 16:00











- Medications


Medications: 


 Current Medications





Aspirin (Ecotrin)  81 mg PO DAILY [facility]


   Last Admin: 08/07/16 09:11 Dose:  81 mg


Atorvastatin Calcium (Lipitor)  20 mg PO DAILY [facility]


   Last Admin: 08/07/16 09:10 Dose:  20 mg


Divalproex Sodium (Depakote Dr(*Bid*))  500 mg PO BID [facility]


   Last Admin: 08/07/16 16:36 Dose:  500 mg


Enalapril Maleate (Vasotec)  10 mg PO DAILY [facility]


   Last Admin: 08/07/16 09:10 Dose:  10 mg


Famotidine (Pepcid)  20 mg PO BID [facility]


   Last Admin: 08/07/16 16:37 Dose:  20 mg


Guaifenesin/Dextromethorphan (Mucinex-Dm 600-30 Mg)  2 tab PO BID [facility]


   Last Admin: 08/07/16 09:09 Dose:  2 tab


Ibuprofen (Motrin Tab)  600 mg PO Q8 PRN


   PRN Reason: Pain, moderate (4-7)


   Last Admin: 07/24/16 21:23 Dose:  600 mg


Metoprolol Tartrate (Lopressor)  50 mg PO Q12 [facility]


   Last Admin: 08/07/16 09:09 Dose:  50 mg


Nicotine (Nicoderm Cq)  1 patch TD DAILY [facility]


   Last Admin: 08/07/16 09:12 Dose:  1 patch


Nitroglycerin (Nitrostat Sl Tab)  0.4 mg SL Q5M PRN


   PRN Reason: cp


   Last Admin: 08/02/16 07:37 Dose:  0.4 mg


Quetiapine Fumarate (Seroquel)  25 mg PO HS [facility]


   Last Admin: 08/06/16 21:17 Dose:  25 mg











- Labs


Labs: 


 





 08/02/16 08:10 





 08/02/16 08:10 





 











PT  11.2 SECONDS (9.4-12.0)   12/14/15  05:20    


 


INR  1.05  (0.89-1.20)   12/14/15  05:20    


 


APTT  36.2 SECONDS (23.1-32.0)  H  12/14/15  05:20    














- Constitutional


Appears: Well, Non-toxic, No Acute Distress





- Head Exam


Head Exam: ATRAUMATIC, NORMAL INSPECTION, NORMOCEPHALIC





- Eye Exam


Eye Exam: EOMI, Normal appearance, PERRL


Pupil Exam: NORMAL ACCOMODATION, PERRL





- ENT Exam


ENT Exam: Mucous Membranes Moist, Normal Exam





- Neck Exam


Neck Exam: Full ROM, Normal Inspection.  absent: Lymphadenopathy





- Respiratory Exam


Respiratory Exam: Clear to Ausculation Bilateral, NORMAL BREATHING PATTERN





- Cardiovascular Exam


Cardiovascular Exam: REGULAR RHYTHM, +S1, +S2.  absent: Murmur





- GI/Abdominal Exam


GI & Abdominal Exam: Soft, Normal Bowel Sounds.  absent: Tenderness





- Extremities Exam


Extremities Exam: Full ROM, Normal Capillary Refill, Normal Inspection.  absent

: Joint Swelling, Pedal Edema





- Back Exam


Back Exam: NORMAL INSPECTION





- Neurological Exam


Neurological Exam: Alert, Awake, CN II-XII Intact, Normal Gait, Oriented x3





- Psychiatric Exam


Psychiatric exam: Normal Affect, Normal Mood





- Skin


Skin Exam: Dry, Intact, Normal Color, Warm





Assessment and Plan


(1) DVT prophylaxis


Status: Acute





(2) CAD (coronary artery disease)


Status: Acute





(3) Smoking


Status: Chronic





(4) Low back pain


Status: Chronic





(5) Agitation


Status: Acute





(6) Scrotal edema


Status: Chronic





(7) Prediabetes


Status: Acute








- Assessment and Plan (Free Text)


Assessment: 


(1) DVT prophylaxis


Assessment & Plan: 


scd and ae hose


ambulation


Status: Acute





(2) CAD (coronary artery disease)


Assessment & Plan: 


cont meds


s/p cardiac arrest


Status: Acute





(3) Smoking


Assessment & Plan: 


nicoderm


Status: Chronic





(4) Low back pain


Assessment & Plan: 


ibuprofen prn


Status: Chronic





(5) Agitation


Assessment & Plan: 


r/t neurocognitive d/o


cont meds


Status: Acute





(6) Scrotal edema


Status: Chronic





(7) Prediabetes


Assessment & Plan: 


diietary control


Status: Acute

## 2016-08-08 RX ADMIN — GUAIFENESIN AND DEXTROMETHORPHAN HYDROBROMIDE SCH TAB: 600; 30 TABLET, EXTENDED RELEASE ORAL at 08:37

## 2016-08-08 RX ADMIN — DIVALPROEX SODIUM SCH MG: 250 TABLET, DELAYED RELEASE ORAL at 16:24

## 2016-08-08 RX ADMIN — GUAIFENESIN AND DEXTROMETHORPHAN HYDROBROMIDE SCH TAB: 600; 30 TABLET, EXTENDED RELEASE ORAL at 16:25

## 2016-08-08 RX ADMIN — DIVALPROEX SODIUM SCH MG: 250 TABLET, DELAYED RELEASE ORAL at 08:38

## 2016-08-08 NOTE — PN
DATE: 08/08/2016



The patient is in no distress.  No complaints.  No fever, chills, nausea, vomiting, diarrhea.  Remain
s pending guardianship.



REVIEW OF SYSTEMS:  Negative.



PHYSICAL EXAMINATION:

GENERAL:  Alert and oriented at baseline.

HEART:  Regular rate and rhythm.  No murmurs, rubs or gallops.

LUNGS:  Clear in all fields bilaterally.

ABDOMEN:  Soft, nontender.  Bowel sounds x 4.

EXTREMITIES:  Distal pulses and motor sensation intact.  Cap refill is brisk.



DIAGNOSES AND PLAN:  

1.  Coronary artery disease, status post cardiac arrest.  Continue medicines.

2.  Neurocognitive disorder.  Continue meds.

3.  Deep venous thrombosis prophylaxis.  SCD and AE hose.

4.  Smoking cessation.  Nicoderm patch.

5.  Intermittent low back pain.  Ibuprofen p.r.n.  

6.  Continue to monitor for guardianship and for placement.





__________________________________________

Fan SILVER







cc:



DD: 08/08/2016 08:13:44  [id number]

TT: 08/08/2016 08:32:53

Confirmation # [id number]

Dictation # [id number]

mn

## 2016-08-08 NOTE — CP.PCM.PN
Subjective





- Date & Time of Evaluation


Date of Evaluation: 08/08/16


Time of Evaluation: 10:13





- Subjective


Subjective: 


dictated note [id number]


no copmalints


no f/c, n/v/d


pending guardianship





Objective





- Vital Signs/Intake and Output


Vital Signs (last 24 hours): 


 











Temp Pulse Resp BP Pulse Ox


 


 97.7 F   65   20   129/76   100 


 


 08/08/16 07:39  08/08/16 08:38  08/08/16 07:39  08/08/16 08:38  08/08/16 07:39











- Medications


Medications: 


 Current Medications





Aspirin (Ecotrin)  81 mg PO DAILY [facility]


   Last Admin: 08/08/16 08:38 Dose:  81 mg


Atorvastatin Calcium (Lipitor)  20 mg PO DAILY [facility]


   Last Admin: 08/08/16 08:38 Dose:  20 mg


Divalproex Sodium (Depakote Dr(*Bid*))  500 mg PO BID Formerly [facility]


   Last Admin: 08/08/16 08:38 Dose:  500 mg


Enalapril Maleate (Vasotec)  10 mg PO DAILY Formerly [facility]


   Last Admin: 08/08/16 08:38 Dose:  10 mg


Famotidine (Pepcid)  20 mg PO BID Formerly [facility]


   Last Admin: 08/08/16 08:37 Dose:  20 mg


Guaifenesin/Dextromethorphan (Mucinex-Dm 600-30 Mg)  2 tab PO BID Formerly [facility]


   Last Admin: 08/08/16 08:37 Dose:  2 tab


Ibuprofen (Motrin Tab)  600 mg PO Q8 PRN


   PRN Reason: Pain, moderate (4-7)


   Last Admin: 07/24/16 21:23 Dose:  600 mg


Metoprolol Tartrate (Lopressor)  50 mg PO Q12 Formerly [facility]


   Last Admin: 08/08/16 08:38 Dose:  50 mg


Nicotine (Nicoderm Cq)  1 patch TD DAILY Formerly [facility]


   Last Admin: 08/08/16 08:37 Dose:  1 patch


Nitroglycerin (Nitrostat Sl Tab)  0.4 mg SL Q5M PRN


   PRN Reason: cp


   Last Admin: 08/02/16 07:37 Dose:  0.4 mg


Quetiapine Fumarate (Seroquel)  25 mg PO HS Formerly [facility]


   Last Admin: 08/07/16 22:00 Dose:  25 mg











- Labs


Labs: 


 





 08/02/16 08:10 





 08/02/16 08:10 





 











PT  11.2 SECONDS (9.4-12.0)   12/14/15  05:20    


 


INR  1.05  (0.89-1.20)   12/14/15  05:20    


 


APTT  36.2 SECONDS (23.1-32.0)  H  12/14/15  05:20    














Assessment and Plan


(1) DVT prophylaxis


Status: Acute





(2) CAD (coronary artery disease)


Status: Acute





(3) Smoking


Status: Chronic





(4) Low back pain


Status: Chronic





(5) Agitation


Status: Acute





(6) Scrotal edema


Status: Chronic





(7) Prediabetes


Status: Acute

## 2016-08-09 RX ADMIN — DIVALPROEX SODIUM SCH MG: 250 TABLET, DELAYED RELEASE ORAL at 10:23

## 2016-08-09 RX ADMIN — GUAIFENESIN AND DEXTROMETHORPHAN HYDROBROMIDE SCH TAB: 600; 30 TABLET, EXTENDED RELEASE ORAL at 10:22

## 2016-08-09 RX ADMIN — DIVALPROEX SODIUM SCH MG: 250 TABLET, DELAYED RELEASE ORAL at 17:19

## 2016-08-09 RX ADMIN — GUAIFENESIN AND DEXTROMETHORPHAN HYDROBROMIDE SCH TAB: 600; 30 TABLET, EXTENDED RELEASE ORAL at 17:19

## 2016-08-09 NOTE — CP.PCM.PN
Subjective





- Date & Time of Evaluation


Date of Evaluation: 08/09/16


Time of Evaluation: 07:10





- Subjective


Subjective: 


no complaints. no f/c, n/v/d


penidng guardianship





Objective





- Vital Signs/Intake and Output


Vital Signs (last 24 hours): 


 











Temp Pulse Resp BP Pulse Ox


 


 97.2 F L  71   18   106/64   99 


 


 08/09/16 00:17  08/09/16 00:17  08/09/16 00:17  08/09/16 00:17  08/09/16 00:17











- Medications


Medications: 


 Current Medications





Aspirin (Ecotrin)  81 mg PO DAILY [facility]


   Last Admin: 08/08/16 08:38 Dose:  81 mg


Atorvastatin Calcium (Lipitor)  20 mg PO DAILY [facility]


   Last Admin: 08/08/16 08:38 Dose:  20 mg


Divalproex Sodium (Depakote Dr(*Bid*))  500 mg PO BID [facility]


   Last Admin: 08/08/16 16:24 Dose:  500 mg


Enalapril Maleate (Vasotec)  10 mg PO DAILY [facility]


   Last Admin: 08/08/16 08:38 Dose:  10 mg


Famotidine (Pepcid)  20 mg PO BID [facility]


   Last Admin: 08/08/16 16:25 Dose:  20 mg


Guaifenesin/Dextromethorphan (Mucinex-Dm 600-30 Mg)  2 tab PO BID [facility]


   Last Admin: 08/08/16 16:25 Dose:  2 tab


Ibuprofen (Motrin Tab)  600 mg PO Q8 PRN


   PRN Reason: Pain, moderate (4-7)


   Last Admin: 07/24/16 21:23 Dose:  600 mg


Metoprolol Tartrate (Lopressor)  50 mg PO Q12 [facility]


   Last Admin: 08/08/16 21:03 Dose:  50 mg


Nicotine (Nicoderm Cq)  1 patch TD DAILY [facility]


   Last Admin: 08/08/16 08:37 Dose:  1 patch


Nitroglycerin (Nitrostat Sl Tab)  0.4 mg SL Q5M PRN


   PRN Reason: cp


   Last Admin: 08/02/16 07:37 Dose:  0.4 mg


Quetiapine Fumarate (Seroquel)  25 mg PO HS [facility]


   Last Admin: 08/08/16 21:47 Dose:  25 mg











- Labs


Labs: 


 





 08/02/16 08:10 





 08/02/16 08:10 





 











PT  11.2 SECONDS (9.4-12.0)   12/14/15  05:20    


 


INR  1.05  (0.89-1.20)   12/14/15  05:20    


 


APTT  36.2 SECONDS (23.1-32.0)  H  12/14/15  05:20    














- Constitutional


Appears: Well, Non-toxic, No Acute Distress





- Head Exam


Head Exam: ATRAUMATIC, NORMAL INSPECTION, NORMOCEPHALIC





- Eye Exam


Eye Exam: EOMI, Normal appearance, PERRL


Pupil Exam: NORMAL ACCOMODATION, PERRL





- ENT Exam


ENT Exam: Mucous Membranes Moist, Normal Exam





- Neck Exam


Neck Exam: Full ROM, Normal Inspection.  absent: Lymphadenopathy





- Respiratory Exam


Respiratory Exam: Clear to Ausculation Bilateral, NORMAL BREATHING PATTERN





- Cardiovascular Exam


Cardiovascular Exam: REGULAR RHYTHM, RRR, +S1, +S2.  absent: Murmur





- GI/Abdominal Exam


GI & Abdominal Exam: Soft, Normal Bowel Sounds.  absent: Tenderness





- Extremities Exam


Extremities Exam: Full ROM, Normal Capillary Refill, Normal Inspection.  absent

: Joint Swelling, Pedal Edema





- Back Exam


Back Exam: NORMAL INSPECTION





- Neurological Exam


Neurological Exam: Alert, Awake, CN II-XII Intact, Normal Gait, Oriented x3





- Psychiatric Exam


Psychiatric exam: Normal Affect, Normal Mood





- Skin


Skin Exam: Dry, Intact, Normal Color, Warm





Assessment and Plan


(1) DVT prophylaxis


Status: Acute





(2) CAD (coronary artery disease)


Status: Acute





(3) Smoking


Status: Chronic





(4) Low back pain


Status: Chronic





(5) Agitation


Status: Acute





(6) Scrotal edema


Status: Chronic





(7) Prediabetes


Status: Acute








- Assessment and Plan (Free Text)


Assessment: 


(1) DVT prophylaxis


Assessment & Plan: 


scd and ae hose


ambulation


Status: Acute





(2) CAD (coronary artery disease)


Assessment & Plan: 


cont meds


s/p cardiac arrest


Status: Acute





(3) Smoking


Assessment & Plan: 


nicoderm


Status: Chronic





(4) Low back pain


Assessment & Plan: 


ibuprofen prn


Status: Chronic





(5) Agitation


Assessment & Plan: 


r/t neurocognitive d/o


cont meds


Status: Acute





(6) Scrotal edema


Status: Chronic





(7) Prediabetes


Assessment & Plan: 


diietary control


Status: Acute

## 2016-08-10 RX ADMIN — GUAIFENESIN AND DEXTROMETHORPHAN HYDROBROMIDE SCH TAB: 600; 30 TABLET, EXTENDED RELEASE ORAL at 09:43

## 2016-08-10 RX ADMIN — GUAIFENESIN AND DEXTROMETHORPHAN HYDROBROMIDE SCH TAB: 600; 30 TABLET, EXTENDED RELEASE ORAL at 16:58

## 2016-08-10 RX ADMIN — DIVALPROEX SODIUM SCH MG: 250 TABLET, DELAYED RELEASE ORAL at 09:44

## 2016-08-10 RX ADMIN — DIVALPROEX SODIUM SCH MG: 250 TABLET, DELAYED RELEASE ORAL at 16:57

## 2016-08-10 NOTE — CP.PCM.PN
Subjective





- Date & Time of Evaluation


Date of Evaluation: 08/10/16


Time of Evaluation: 07:18





- Subjective


Subjective: 


calm and cooperative. no distress/copmlaints. no f/c, nv/d


pending guardianship





Objective





- Vital Signs/Intake and Output


Vital Signs (last 24 hours): 


 











Temp Pulse Resp BP Pulse Ox


 


 98.1 F   80   19   125/75   96 


 


 08/10/16 00:00  08/10/16 00:00  08/10/16 00:00  08/10/16 00:00  08/10/16 00:00











- Medications


Medications: 


 Current Medications





Aspirin (Ecotrin)  81 mg PO DAILY [facility]


   Last Admin: 08/09/16 10:24 Dose:  81 mg


Atorvastatin Calcium (Lipitor)  20 mg PO DAILY [facility]


   Last Admin: 08/09/16 10:22 Dose:  20 mg


Divalproex Sodium (Depakote Dr(*Bid*))  500 mg PO BID [facility]


   Last Admin: 08/09/16 17:19 Dose:  500 mg


Enalapril Maleate (Vasotec)  10 mg PO DAILY [facility]


   Last Admin: 08/09/16 10:24 Dose:  10 mg


Famotidine (Pepcid)  20 mg PO BID [facility]


   Last Admin: 08/09/16 17:19 Dose:  20 mg


Guaifenesin/Dextromethorphan (Mucinex-Dm 600-30 Mg)  2 tab PO BID [facility]


   Last Admin: 08/09/16 17:19 Dose:  2 tab


Ibuprofen (Motrin Tab)  600 mg PO Q8 PRN


   PRN Reason: Pain, moderate (4-7)


   Last Admin: 07/24/16 21:23 Dose:  600 mg


Metoprolol Tartrate (Lopressor)  50 mg PO Q12 [facility]


   Last Admin: 08/09/16 20:51 Dose:  50 mg


Nicotine (Nicoderm Cq)  1 patch TD DAILY [facility]


   Last Admin: 08/09/16 10:25 Dose:  1 patch


Nitroglycerin (Nitrostat Sl Tab)  0.4 mg SL Q5M PRN


   PRN Reason: cp


   Last Admin: 08/02/16 07:37 Dose:  0.4 mg


Quetiapine Fumarate (Seroquel)  25 mg PO HS [facility]


   Last Admin: 08/09/16 22:31 Dose:  25 mg











- Labs


Labs: 


 





 08/02/16 08:10 





 08/02/16 08:10 





 











PT  11.2 SECONDS (9.4-12.0)   12/14/15  05:20    


 


INR  1.05  (0.89-1.20)   12/14/15  05:20    


 


APTT  36.2 SECONDS (23.1-32.0)  H  12/14/15  05:20    














- Constitutional


Appears: Well, Non-toxic, No Acute Distress





- Head Exam


Head Exam: ATRAUMATIC, NORMAL INSPECTION, NORMOCEPHALIC





- Eye Exam


Eye Exam: EOMI, Normal appearance, PERRL


Pupil Exam: NORMAL ACCOMODATION, PERRL





- ENT Exam


ENT Exam: Mucous Membranes Moist, Normal Exam





- Neck Exam


Neck Exam: Full ROM, Normal Inspection.  absent: Lymphadenopathy





- Respiratory Exam


Respiratory Exam: Clear to Ausculation Bilateral, NORMAL BREATHING PATTERN





- Cardiovascular Exam


Cardiovascular Exam: REGULAR RHYTHM, RRR, +S1, +S2.  absent: Murmur





- GI/Abdominal Exam


GI & Abdominal Exam: Soft, Normal Bowel Sounds.  absent: Tenderness





- Extremities Exam


Extremities Exam: Full ROM, Normal Capillary Refill, Normal Inspection.  absent

: Joint Swelling, Pedal Edema





- Back Exam


Back Exam: NORMAL INSPECTION





- Neurological Exam


Neurological Exam: Alert, Awake, CN II-XII Intact, Normal Gait, Oriented x3





- Psychiatric Exam


Psychiatric exam: Normal Affect, Normal Mood





- Skin


Skin Exam: Dry, Intact, Normal Color, Warm





Assessment and Plan


(1) DVT prophylaxis


Assessment & Plan: 


scd and aehose, ambulation


Status: Acute





(2) CAD (coronary artery disease)


Assessment & Plan: 


s/p cardiac arrest


cont meds


Status: Acute





(3) Smoking


Assessment & Plan: 


nicoderm


Status: Chronic





(4) Low back pain


Assessment & Plan: 


ibuprofen prn


Status: Chronic





(5) Agitation


Status: Inactive





(6) Scrotal edema


Status: Chronic





(7) Prediabetes


Assessment & Plan: 


diet and exercise


Status: Acute





(8) Neurocognitive disorder


Assessment & Plan: 


cont meds


Status: Acute

## 2016-08-11 RX ADMIN — DIVALPROEX SODIUM SCH MG: 250 TABLET, DELAYED RELEASE ORAL at 16:37

## 2016-08-11 RX ADMIN — GUAIFENESIN AND DEXTROMETHORPHAN HYDROBROMIDE SCH TAB: 600; 30 TABLET, EXTENDED RELEASE ORAL at 08:43

## 2016-08-11 RX ADMIN — GUAIFENESIN AND DEXTROMETHORPHAN HYDROBROMIDE SCH TAB: 600; 30 TABLET, EXTENDED RELEASE ORAL at 16:37

## 2016-08-11 RX ADMIN — DIVALPROEX SODIUM SCH MG: 250 TABLET, DELAYED RELEASE ORAL at 08:43

## 2016-08-11 NOTE — CP.PCM.PN
Subjective





- Date & Time of Evaluation


Date of Evaluation: 08/11/16


Time of Evaluation: 07:18





- Subjective


Subjective: 


pt comfortable in bed, no distress, c/o intermittent cp w/ respiration. no cough

/congestion. no pain at present. no f/c, n/v/d. pt had cardio workup withi n 

last week and was negative.  pt on asa, bblocker.  pts mostly sedentary.


ros negative


pending guardianship





Objective





- Vital Signs/Intake and Output


Vital Signs (last 24 hours): 


 











Temp Pulse Resp BP Pulse Ox


 


 97.8 F   66   18   97/67 L  98 


 


 08/11/16 00:55  08/11/16 00:55  08/11/16 00:55  08/11/16 00:55  08/11/16 00:55











- Medications


Medications: 


 Current Medications





Aspirin (Ecotrin)  81 mg PO DAILY [facility]


   Last Admin: 08/10/16 09:44 Dose:  81 mg


Atorvastatin Calcium (Lipitor)  20 mg PO DAILY [facility]


   Last Admin: 08/10/16 09:44 Dose:  20 mg


Divalproex Sodium (Depakote Dr(*Bid*))  500 mg PO BID [facility]


   Last Admin: 08/10/16 16:57 Dose:  500 mg


Enalapril Maleate (Vasotec)  10 mg PO DAILY [facility]


   Last Admin: 08/10/16 09:44 Dose:  10 mg


Famotidine (Pepcid)  20 mg PO BID [facility]


   Last Admin: 08/10/16 16:57 Dose:  20 mg


Guaifenesin/Dextromethorphan (Mucinex-Dm 600-30 Mg)  2 tab PO BID [facility]


   Last Admin: 08/10/16 16:58 Dose:  2 tab


Ibuprofen (Motrin Tab)  600 mg PO Q8 PRN


   PRN Reason: Pain, moderate (4-7)


   Last Admin: 07/24/16 21:23 Dose:  600 mg


Metoprolol Tartrate (Lopressor)  50 mg PO Q12 [facility]


   Last Admin: 08/10/16 21:23 Dose:  50 mg


Nicotine (Nicoderm Cq)  1 patch TD DAILY [facility]


   Last Admin: 08/10/16 09:45 Dose:  1 patch


Nitroglycerin (Nitrostat Sl Tab)  0.4 mg SL Q5M PRN


   PRN Reason: cp


   Last Admin: 08/02/16 07:37 Dose:  0.4 mg


Quetiapine Fumarate (Seroquel)  25 mg PO HS [facility]


   Last Admin: 08/10/16 21:23 Dose:  25 mg











- Labs


Labs: 


 





 08/02/16 08:10 





 08/02/16 08:10 





 











PT  11.2 SECONDS (9.4-12.0)   12/14/15  05:20    


 


INR  1.05  (0.89-1.20)   12/14/15  05:20    


 


APTT  36.2 SECONDS (23.1-32.0)  H  12/14/15  05:20    














- Constitutional


Appears: Well, Non-toxic, No Acute Distress





- Head Exam


Head Exam: ATRAUMATIC, NORMAL INSPECTION, NORMOCEPHALIC





- Eye Exam


Eye Exam: EOMI, Normal appearance, PERRL


Pupil Exam: NORMAL ACCOMODATION, PERRL





- ENT Exam


ENT Exam: Mucous Membranes Moist, Normal Exam





- Neck Exam


Neck Exam: Full ROM, Normal Inspection.  absent: Lymphadenopathy





- Respiratory Exam


Respiratory Exam: Clear to Ausculation Bilateral, NORMAL BREATHING PATTERN





- Cardiovascular Exam


Cardiovascular Exam: REGULAR RHYTHM, RRR, +S1, +S2.  absent: Murmur





- GI/Abdominal Exam


GI & Abdominal Exam: Soft, Normal Bowel Sounds.  absent: Tenderness





- Extremities Exam


Extremities Exam: Full ROM, Normal Capillary Refill, Normal Inspection.  absent

: Joint Swelling, Pedal Edema





- Back Exam


Back Exam: NORMAL INSPECTION





- Neurological Exam


Neurological Exam: Alert, Awake, CN II-XII Intact, Normal Gait, Oriented x3





- Psychiatric Exam


Psychiatric exam: Normal Affect, Normal Mood





- Skin


Skin Exam: Dry, Intact, Normal Color, Warm





Assessment and Plan


(1) DVT prophylaxis


Status: Acute





(2) CAD (coronary artery disease)


Status: Acute





(3) Smoking


Status: Acute





(4) Low back pain


Status: Chronic





(5) Scrotal edema


Status: Chronic





(6) Prediabetes


Status: Acute





(7) Neurocognitive disorder


Status: Acute








- Assessment and Plan (Free Text)


Assessment: 


(1) DVT prophylaxis


Assessment & Plan: 


scd and aehose, ambulation


Status: Acute





(2) CAD (coronary artery disease)


Assessment & Plan: 


s/p cardiac arrest


cont meds


CP likely rt immobility, oob to chair, ambulation


Status: Acute





(3) Smoking


Assessment & Plan: 


nicoderm


Status: Chronic





(4) Low back pain


Assessment & Plan: 


ibuprofen prn


Status: Chronic





(5) Agitation


Status: Inactive





(6) Scrotal edema


Status: Chronic





(7) Prediabetes


Assessment & Plan: 


diet and exercise


Status: Acute





(8) Neurocognitive disorder


Assessment & Plan: 


cont meds


Status: Acute

## 2016-08-12 RX ADMIN — DIVALPROEX SODIUM SCH MG: 250 TABLET, DELAYED RELEASE ORAL at 08:16

## 2016-08-12 RX ADMIN — GUAIFENESIN AND DEXTROMETHORPHAN HYDROBROMIDE SCH TAB: 600; 30 TABLET, EXTENDED RELEASE ORAL at 08:15

## 2016-08-12 RX ADMIN — DIVALPROEX SODIUM SCH MG: 250 TABLET, DELAYED RELEASE ORAL at 16:08

## 2016-08-12 RX ADMIN — GUAIFENESIN AND DEXTROMETHORPHAN HYDROBROMIDE SCH TAB: 600; 30 TABLET, EXTENDED RELEASE ORAL at 16:08

## 2016-08-12 NOTE — PN
DATE: 08/12/2016



The patient in no distress.  No complaints.  No pain.  No fever, chills, nausea, vomiting, diarrhea. 
 Remains pending guardianship.



REVIEW OF SYSTEMS:  Negative.



PHYSICAL EXAMINATION:

GENERAL:  Alert and oriented to his baseline.

HEART:  Regular rate and rhythm.  No murmurs, rubs or gallops.

LUNGS:  Clear in all fields bilaterally.

ABDOMEN:  Soft, nontender.  Bowel sounds x 4.

EXTREMITIES:  Distal pulses, motor sensation intact.  Cap refill is brisk.



DIAGNOSES AND PLAN:  Coronary artery disease, status post cardiac arrest, continue medicines.  Neuroc
ognitive disorder, continue medication.  Deep venous thrombosis prophylaxis, sequential compression d
evices and antiembolism hose.  Smoking, Nicoderm patch.  Low back pain, intermittent, ibuprofen.  The
 patient will be discharged once guardianship is established and safe discharge for the patient is se
t up for the patient.





__________________________________________

Fan SILVER







cc:



DD: 08/12/2016 08:31:43  [id number]

TT: 08/12/2016 09:03:00

Confirmation # [id number]

Dictation # [id number]

en

## 2016-08-12 NOTE — CP.PCM.PN
Subjective





- Date & Time of Evaluation


Date of Evaluation: 08/12/16


Time of Evaluation: 07:17





- Subjective


Subjective: 


dictated note [id number]


no distress/complaints


pending guardianship





Objective





- Vital Signs/Intake and Output


Vital Signs (last 24 hours): 


 











Temp Pulse Resp BP Pulse Ox


 


 97.7 F   65   19   107/70   100 


 


 08/12/16 01:00  08/12/16 01:00  08/12/16 01:00  08/12/16 01:00  08/12/16 01:00











- Medications


Medications: 


 Current Medications





Aspirin (Ecotrin)  81 mg PO DAILY [facility]


   Last Admin: 08/11/16 08:46 Dose:  81 mg


Atorvastatin Calcium (Lipitor)  20 mg PO DAILY [facility]


   Last Admin: 08/11/16 08:46 Dose:  20 mg


Divalproex Sodium (Depakote Dr(*Bid*))  500 mg PO BID [facility]


   Last Admin: 08/11/16 16:37 Dose:  500 mg


Enalapril Maleate (Vasotec)  10 mg PO DAILY [facility]


   Last Admin: 08/11/16 08:46 Dose:  10 mg


Famotidine (Pepcid)  20 mg PO BID [facility]


   Last Admin: 08/11/16 16:39 Dose:  20 mg


Guaifenesin/Dextromethorphan (Mucinex-Dm 600-30 Mg)  2 tab PO BID [facility]


   Last Admin: 08/11/16 16:37 Dose:  2 tab


Ibuprofen (Motrin Tab)  600 mg PO Q8 PRN


   PRN Reason: Pain, moderate (4-7)


   Last Admin: 07/24/16 21:23 Dose:  600 mg


Metoprolol Tartrate (Lopressor)  50 mg PO Q12 [facility]


   Last Admin: 08/11/16 21:10 Dose:  50 mg


Nicotine (Nicoderm Cq)  1 patch TD DAILY [facility]


   Last Admin: 08/11/16 08:45 Dose:  1 patch


Nitroglycerin (Nitrostat Sl Tab)  0.4 mg SL Q5M PRN


   PRN Reason: cp


   Last Admin: 08/02/16 07:37 Dose:  0.4 mg


Quetiapine Fumarate (Seroquel)  25 mg PO HS [facility]


   Last Admin: 08/11/16 21:10 Dose:  25 mg











- Labs


Labs: 


 





 08/02/16 08:10 





 08/02/16 08:10 





 











PT  11.2 SECONDS (9.4-12.0)   12/14/15  05:20    


 


INR  1.05  (0.89-1.20)   12/14/15  05:20    


 


APTT  36.2 SECONDS (23.1-32.0)  H  12/14/15  05:20    














Assessment and Plan


(1) DVT prophylaxis


Status: Acute





(2) CAD (coronary artery disease)


Status: Acute





(3) Smoking


Status: Acute





(4) Low back pain


Status: Chronic





(5) Scrotal edema


Status: Chronic





(6) Prediabetes


Status: Acute





(7) Neurocognitive disorder


Status: Acute

## 2016-08-13 RX ADMIN — GUAIFENESIN AND DEXTROMETHORPHAN HYDROBROMIDE SCH TAB: 600; 30 TABLET, EXTENDED RELEASE ORAL at 09:41

## 2016-08-13 RX ADMIN — DIVALPROEX SODIUM SCH MG: 250 TABLET, DELAYED RELEASE ORAL at 09:39

## 2016-08-13 RX ADMIN — GUAIFENESIN AND DEXTROMETHORPHAN HYDROBROMIDE SCH TAB: 600; 30 TABLET, EXTENDED RELEASE ORAL at 17:00

## 2016-08-13 RX ADMIN — DIVALPROEX SODIUM SCH MG: 250 TABLET, DELAYED RELEASE ORAL at 16:29

## 2016-08-13 NOTE — CP.PCM.PN
Subjective





- Date & Time of Evaluation


Date of Evaluation: 08/13/16


Time of Evaluation: 07:06





- Subjective


Subjective: 


dictated note [id number]


pending guardianship


no f/c, n/v/d





Objective





- Vital Signs/Intake and Output


Vital Signs (last 24 hours): 


 











Temp Pulse Resp BP Pulse Ox


 


 97.7 F   69   19   108/64   94 L


 


 08/13/16 00:00  08/13/16 00:00  08/13/16 00:00  08/13/16 00:00  08/13/16 00:00











- Medications


Medications: 


 Current Medications





Aspirin (Ecotrin)  81 mg PO DAILY [facility]


   Last Admin: 08/12/16 08:16 Dose:  81 mg


Atorvastatin Calcium (Lipitor)  20 mg PO DAILY [facility]


   Last Admin: 08/12/16 08:16 Dose:  20 mg


Divalproex Sodium (Depakote Dr(*Bid*))  500 mg PO BID [facility]


   Last Admin: 08/12/16 16:08 Dose:  500 mg


Enalapril Maleate (Vasotec)  10 mg PO DAILY [facility]


   Last Admin: 08/12/16 08:16 Dose:  10 mg


Famotidine (Pepcid)  20 mg PO BID [facility]


   Last Admin: 08/12/16 16:08 Dose:  20 mg


Guaifenesin/Dextromethorphan (Mucinex-Dm 600-30 Mg)  2 tab PO BID [facility]


   Last Admin: 08/12/16 16:08 Dose:  2 tab


Ibuprofen (Motrin Tab)  600 mg PO Q8 PRN


   PRN Reason: Pain, moderate (4-7)


   Last Admin: 07/24/16 21:23 Dose:  600 mg


Metoprolol Tartrate (Lopressor)  50 mg PO Q12 [facility]


   Last Admin: 08/12/16 21:33 Dose:  50 mg


Nicotine (Nicoderm Cq)  1 patch TD DAILY [facility]


   Last Admin: 08/12/16 08:14 Dose:  1 patch


Nitroglycerin (Nitrostat Sl Tab)  0.4 mg SL Q5M PRN


   PRN Reason: cp


   Last Admin: 08/02/16 07:37 Dose:  0.4 mg


Quetiapine Fumarate (Seroquel)  25 mg PO HS [facility]


   Last Admin: 08/12/16 21:33 Dose:  25 mg











- Labs


Labs: 


 





 08/02/16 08:10 





 08/02/16 08:10 





 











PT  11.2 SECONDS (9.4-12.0)   12/14/15  05:20    


 


INR  1.05  (0.89-1.20)   12/14/15  05:20    


 


APTT  36.2 SECONDS (23.1-32.0)  H  12/14/15  05:20    














Assessment and Plan


(1) DVT prophylaxis


Status: Acute





(2) CAD (coronary artery disease)


Status: Acute





(3) Smoking


Status: Acute





(4) Low back pain


Status: Chronic





(5) Scrotal edema


Status: Chronic





(6) Prediabetes


Status: Acute





(7) Neurocognitive disorder


Status: Acute

## 2016-08-13 NOTE — PN
DATE: 08/13/2016



SUBJECTIVE:  The patient evaluated in bed, no distress, no complaints.  No fever
, chills, nausea, vomiting, diarrhea.  Pending guardianship and placement.  



OBJECTIVE FINDINGS:

GENERAL:  Alert and oriented to his baseline.

HEART:  Regular rate and rhythm.  No murmurs, rubs or gallops.

LUNGS:  Clear in all fields bilaterally.

ABDOMEN:  Soft, nontender.  Bowel sounds x 4.

EXTREMITIES:  Distal pulses, motor sensation intact.  Cap refill is brisk.



DIAGNOSES AND PLAN:  

1.  Coronary artery disease, status post cardiac arrest.  Continue all 
medications.  

2.  Neurocognitive disorder.  Continue all medications.  

3.  Deep venous thrombosis prophylaxis, sequential compression devices and 
antiembolism hose, ambulation.  

4.  Smoking cessation, Nicoderm patch.  

5.  Low back pain, intermittent.  Ibuprofen p.r.n.  



The patient is pending guardianship and placement.  Will continue to follow.





__________________________________________

Fan SILVER







cc:   



DD: 08/13/2016 08:05:40  [id number]

TT: 08/13/2016 08:41:07

Confirmation # [id number]

Dictation # [id number]

ln

MTDD

## 2016-08-14 RX ADMIN — DIVALPROEX SODIUM SCH MG: 250 TABLET, DELAYED RELEASE ORAL at 08:17

## 2016-08-14 RX ADMIN — GUAIFENESIN AND DEXTROMETHORPHAN HYDROBROMIDE SCH TAB: 600; 30 TABLET, EXTENDED RELEASE ORAL at 16:17

## 2016-08-14 RX ADMIN — DIVALPROEX SODIUM SCH MG: 250 TABLET, DELAYED RELEASE ORAL at 16:17

## 2016-08-14 RX ADMIN — GUAIFENESIN AND DEXTROMETHORPHAN HYDROBROMIDE SCH TAB: 600; 30 TABLET, EXTENDED RELEASE ORAL at 08:18

## 2016-08-14 NOTE — PN
DATE: 08/14/2016



Evaluated in bed, no distress, no complaints.  No fever, chills, nausea, vomiting, diarrhea.  Remains
 pending guardianship, remains unable to make decisions for himself or care for himself.



REVIEW OF SYSTEMS:  Negative.



OBJECTIVE FINDINGS:

GENERAL:  Alert and oriented at his baseline.

HEART:  Regular rate and rhythm.  No murmurs, rubs or gallops.

LUNGS:  Clear in all fields bilaterally.

ABDOMEN:  Soft, nontender.  Bowel sounds x 4.

EXTREMITIES:  Distal pulses, motor sensation intact.  Cap refill is brisk.



DIAGNOSES AND PLAN:

1.  Coronary artery disease, status post cardiac arrest.  Continue medications.

2.  Neurocognitive disorder.  Continue medications.  Pending guardianship.

3.  Deep venous thrombosis prophylaxis.  Sequential compression devices, antiembolism hose, ambulatio
n.

4.  Intermittent low back pain.  Ibuprofen p.r.n.

5.  Smoking addiction, nicotine addiction.  Continue Nicoderm patch.



We will continue to monitor patient closely.  Continue to care for patient as he is unable to care fo
r himself until guardianship is established and safe disposition on patient is set up.





__________________________________________

Fan SILVER







cc:



DD: 08/14/2016 12:22:59  [id number]

TT: 08/14/2016 12:44:10

Confirmation # [id number]

Dictation # [id number]

en

## 2016-08-14 NOTE — CP.PCM.PN
Subjective





- Date & Time of Evaluation


Date of Evaluation: 08/14/16


Time of Evaluation: 12:03





- Subjective


Subjective: 


dictated note


no f/c, n/v/d


pending guardianship





Objective





- Vital Signs/Intake and Output


Vital Signs (last 24 hours): 


 











Temp Pulse Resp BP Pulse Ox


 


 97.3 F L  60   18   111/66   97 


 


 08/14/16 08:24  08/14/16 08:24  08/14/16 08:24  08/14/16 08:24  08/14/16 08:24











- Medications


Medications: 


 Current Medications





Aspirin (Ecotrin)  81 mg PO DAILY [facility]


   Last Admin: 08/14/16 08:17 Dose:  81 mg


Atorvastatin Calcium (Lipitor)  20 mg PO DAILY [facility]


   Last Admin: 08/14/16 08:17 Dose:  20 mg


Divalproex Sodium (Depakote Dr(*Bid*))  500 mg PO BID [facility]


   Last Admin: 08/14/16 08:17 Dose:  500 mg


Enalapril Maleate (Vasotec)  10 mg PO DAILY [facility]


   Last Admin: 08/14/16 08:16 Dose:  10 mg


Famotidine (Pepcid)  20 mg PO BID [facility]


   Last Admin: 08/14/16 08:17 Dose:  20 mg


Guaifenesin/Dextromethorphan (Mucinex-Dm 600-30 Mg)  2 tab PO BID [facility]


   Last Admin: 08/14/16 08:18 Dose:  2 tab


Ibuprofen (Motrin Tab)  600 mg PO Q8 PRN


   PRN Reason: Pain, moderate (4-7)


   Last Admin: 07/24/16 21:23 Dose:  600 mg


Metoprolol Tartrate (Lopressor)  50 mg PO Q12 [facility]


   Last Admin: 08/14/16 08:16 Dose:  50 mg


Nicotine (Nicoderm Cq)  1 patch TD DAILY [facility]


   Last Admin: 08/14/16 08:15 Dose:  1 patch


Nitroglycerin (Nitrostat Sl Tab)  0.4 mg SL Q5M PRN


   PRN Reason: cp


   Last Admin: 08/02/16 07:37 Dose:  0.4 mg


Quetiapine Fumarate (Seroquel)  25 mg PO HS [facility]


   Last Admin: 08/13/16 21:12 Dose:  25 mg











- Labs


Labs: 


 





 08/02/16 08:10 





 08/02/16 08:10 





 











PT  11.2 SECONDS (9.4-12.0)   12/14/15  05:20    


 


INR  1.05  (0.89-1.20)   12/14/15  05:20    


 


APTT  36.2 SECONDS (23.1-32.0)  H  12/14/15  05:20    














Assessment and Plan


(1) DVT prophylaxis


Status: Acute





(2) CAD (coronary artery disease)


Status: Acute





(3) Smoking


Status: Acute





(4) Low back pain


Status: Chronic





(5) Scrotal edema


Status: Chronic





(6) Prediabetes


Status: Acute





(7) Neurocognitive disorder


Status: Acute

## 2016-08-15 RX ADMIN — DIVALPROEX SODIUM SCH MG: 250 TABLET, DELAYED RELEASE ORAL at 08:24

## 2016-08-15 RX ADMIN — DIVALPROEX SODIUM SCH MG: 250 TABLET, DELAYED RELEASE ORAL at 16:17

## 2016-08-15 NOTE — CP.PCM.PN
Subjective





- Date & Time of Evaluation


Date of Evaluation: 08/15/16


Time of Evaluation: 07:35





- Subjective


Subjective: 


no distress/comp[laints. no f/c, n/v/d


pending guardianship





Objective





- Vital Signs/Intake and Output


Vital Signs (last 24 hours): 


 











Temp Pulse Resp BP Pulse Ox


 


 97.3 F L  65   18   112/78   96 


 


 08/15/16 07:30  08/15/16 07:30  08/15/16 07:30  08/15/16 07:30  08/15/16 07:30











- Medications


Medications: 


 Current Medications





Aspirin (Ecotrin)  81 mg PO DAILY [facility]


   Last Admin: 08/14/16 08:17 Dose:  81 mg


Atorvastatin Calcium (Lipitor)  20 mg PO DAILY [facility]


   Last Admin: 08/14/16 08:17 Dose:  20 mg


Divalproex Sodium (Depakote Dr(*Bid*))  500 mg PO BID [facility]


   Last Admin: 08/14/16 16:17 Dose:  500 mg


Enalapril Maleate (Vasotec)  10 mg PO DAILY [facility]


   Last Admin: 08/14/16 08:16 Dose:  10 mg


Famotidine (Pepcid)  20 mg PO BID [facility]


   Last Admin: 08/14/16 16:17 Dose:  20 mg


Guaifenesin/Dextromethorphan (Mucinex-Dm 600-30 Mg)  2 tab PO BID [facility]


   Last Admin: 08/14/16 16:17 Dose:  2 tab


Ibuprofen (Motrin Tab)  600 mg PO Q8 PRN


   PRN Reason: Pain, moderate (4-7)


   Last Admin: 07/24/16 21:23 Dose:  600 mg


Metoprolol Tartrate (Lopressor)  50 mg PO Q12 [facility]


   Last Admin: 08/14/16 21:19 Dose:  50 mg


Nicotine (Nicoderm Cq)  1 patch TD DAILY [facility]


   Last Admin: 08/14/16 08:15 Dose:  1 patch


Nitroglycerin (Nitrostat Sl Tab)  0.4 mg SL Q5M PRN


   PRN Reason: cp


   Last Admin: 08/02/16 07:37 Dose:  0.4 mg


Quetiapine Fumarate (Seroquel)  25 mg PO HS [facility]


   Last Admin: 08/14/16 21:19 Dose:  25 mg











- Labs


Labs: 


 





 08/02/16 08:10 





 08/02/16 08:10 





 











PT  11.2 SECONDS (9.4-12.0)   12/14/15  05:20    


 


INR  1.05  (0.89-1.20)   12/14/15  05:20    


 


APTT  36.2 SECONDS (23.1-32.0)  H  12/14/15  05:20    














- Constitutional


Appears: Well, Non-toxic, No Acute Distress





- Head Exam


Head Exam: ATRAUMATIC, NORMAL INSPECTION, NORMOCEPHALIC





- Eye Exam


Eye Exam: EOMI, Normal appearance, PERRL


Pupil Exam: NORMAL ACCOMODATION, PERRL





- ENT Exam


ENT Exam: Mucous Membranes Moist, Normal Exam





- Neck Exam


Neck Exam: Full ROM, Normal Inspection.  absent: Lymphadenopathy





- Respiratory Exam


Respiratory Exam: Clear to Ausculation Bilateral, NORMAL BREATHING PATTERN





- Cardiovascular Exam


Cardiovascular Exam: REGULAR RHYTHM, +S1, +S2.  absent: Murmur





- GI/Abdominal Exam


GI & Abdominal Exam: Soft, Normal Bowel Sounds.  absent: Tenderness





- Extremities Exam


Extremities Exam: Full ROM, Normal Capillary Refill, Normal Inspection.  absent

: Joint Swelling, Pedal Edema





- Back Exam


Back Exam: NORMAL INSPECTION





- Neurological Exam


Neurological Exam: Alert, Awake, CN II-XII Intact, Normal Gait, Oriented x3





- Psychiatric Exam


Psychiatric exam: Normal Affect, Normal Mood





- Skin


Skin Exam: Dry, Intact, Normal Color, Warm





Assessment and Plan


(1) DVT prophylaxis


Assessment & Plan: 


scd nad ae hose


ambulation


Status: Acute





(2) CAD (coronary artery disease)


Assessment & Plan: 


s/p cardiac arrest


cont meds


Status: Acute





(3) Smoking


Assessment & Plan: 


nicoderm


Status: Acute





(4) Low back pain


Assessment & Plan: 


ibuprofen prn


Status: Chronic





(5) Scrotal edema


Status: Chronic





(6) Prediabetes


Assessment & Plan: 


diet/exercsie


Status: Chronic





(7) Neurocognitive disorder


Assessment & Plan: 


cont meds


pending guardianship


Status: Acute

## 2016-08-16 RX ADMIN — DIVALPROEX SODIUM SCH MG: 250 TABLET, DELAYED RELEASE ORAL at 08:02

## 2016-08-16 RX ADMIN — DIVALPROEX SODIUM SCH MG: 250 TABLET, DELAYED RELEASE ORAL at 16:38

## 2016-08-16 NOTE — CP.PCM.PN
Subjective





- Date & Time of Evaluation


Date of Evaluation: 08/16/16


Time of Evaluation: 07:29





- Subjective


Subjective: 


doign well, no complaints


pending guardianship


no f/c, n/v/d.





Objective





- Vital Signs/Intake and Output


Vital Signs (last 24 hours): 


 











Temp Pulse Resp BP Pulse Ox


 


 97.3 F L  71   20   102/64   97 


 


 08/16/16 07:24  08/16/16 07:24  08/16/16 07:24  08/16/16 07:24  08/16/16 07:24











- Medications


Medications: 


 Current Medications





Aspirin (Ecotrin)  81 mg PO DAILY [facility]


   Last Admin: 08/15/16 08:24 Dose:  81 mg


Atorvastatin Calcium (Lipitor)  20 mg PO DAILY [facility]


   Last Admin: 08/15/16 08:24 Dose:  20 mg


Divalproex Sodium (Depakote Dr(*Bid*))  500 mg PO BID [facility]


   Last Admin: 08/15/16 16:17 Dose:  500 mg


Enalapril Maleate (Vasotec)  10 mg PO DAILY [facility]


   Last Admin: 08/15/16 08:24 Dose:  10 mg


Famotidine (Pepcid)  20 mg PO BID [facility]


   Last Admin: 08/15/16 16:17 Dose:  20 mg


Ibuprofen (Motrin Tab)  600 mg PO Q8 PRN


   PRN Reason: Pain, moderate (4-7)


   Last Admin: 07/24/16 21:23 Dose:  600 mg


Metoprolol Tartrate (Lopressor)  50 mg PO Q12 [facility]


   Last Admin: 08/15/16 21:14 Dose:  50 mg


Nicotine (Nicoderm Cq)  1 patch TD DAILY [facility]


   Last Admin: 08/15/16 08:23 Dose:  1 patch


Nitroglycerin (Nitrostat Sl Tab)  0.4 mg SL Q5M PRN


   PRN Reason: cp


   Last Admin: 08/02/16 07:37 Dose:  0.4 mg


Quetiapine Fumarate (Seroquel)  25 mg PO HS [facility]


   Last Admin: 08/15/16 21:14 Dose:  25 mg











- Labs


Labs: 


 





 08/02/16 08:10 





 08/02/16 08:10 





 











PT  11.2 SECONDS (9.4-12.0)   12/14/15  05:20    


 


INR  1.05  (0.89-1.20)   12/14/15  05:20    


 


APTT  36.2 SECONDS (23.1-32.0)  H  12/14/15  05:20    














- Constitutional


Appears: Well, Non-toxic, No Acute Distress





- Head Exam


Head Exam: ATRAUMATIC, NORMAL INSPECTION, NORMOCEPHALIC





- Eye Exam


Eye Exam: EOMI, Normal appearance, PERRL


Pupil Exam: NORMAL ACCOMODATION, PERRL





- ENT Exam


ENT Exam: Mucous Membranes Moist, Normal Exam





- Neck Exam


Neck Exam: Full ROM, Normal Inspection.  absent: Lymphadenopathy





- Respiratory Exam


Respiratory Exam: Clear to Ausculation Bilateral, NORMAL BREATHING PATTERN





- Cardiovascular Exam


Cardiovascular Exam: REGULAR RHYTHM, RRR, +S1, +S2.  absent: Murmur





- GI/Abdominal Exam


GI & Abdominal Exam: Soft, Normal Bowel Sounds.  absent: Tenderness





- Rectal Exam


Rectal Exam: NORMAL INSPECTION





- Extremities Exam


Extremities Exam: Full ROM, Normal Capillary Refill, Normal Inspection.  absent

: Joint Swelling, Pedal Edema





- Back Exam


Back Exam: NORMAL INSPECTION





- Neurological Exam


Neurological Exam: Alert, Awake, CN II-XII Intact, Normal Gait, Oriented x3





- Psychiatric Exam


Psychiatric exam: Normal Affect, Normal Mood





- Skin


Skin Exam: Dry, Intact, Normal Color, Warm





Assessment and Plan


(1) DVT prophylaxis


Assessment & Plan: 


scd nad ae hose


ambulation


Status: Acute





(2) CAD (coronary artery disease)


Assessment & Plan: 


s/p cardiac arrest, cont meds


Status: Acute





(3) Smoking


Assessment & Plan: 


nicoderm


Status: Chronic





(4) Low back pain


Assessment & Plan: 


ibuprofen, prn


Status: Chronic





(5) Scrotal edema


Status: Chronic





(6) Prediabetes


Assessment & Plan: 


diet/exercise


Status: Chronic





(7) Neurocognitive disorder


Assessment & Plan: 


cont meds


Status: Chronic

## 2016-08-17 RX ADMIN — DIVALPROEX SODIUM SCH MG: 250 TABLET, DELAYED RELEASE ORAL at 08:06

## 2016-08-17 RX ADMIN — DIVALPROEX SODIUM SCH MG: 250 TABLET, DELAYED RELEASE ORAL at 17:52

## 2016-08-17 NOTE — CP.PCM.PN
Subjective





- Date & Time of Evaluation


Date of Evaluation: 08/17/16


Time of Evaluation: 07:10





- Subjective


Subjective: 


dictated note [id number]


no distress


no f/c, n/v/d


pending guardianship





Objective





- Vital Signs/Intake and Output


Vital Signs (last 24 hours): 


 











Temp Pulse Resp BP Pulse Ox


 


 97.4 F L  76   18   102/63   100 


 


 08/17/16 00:23  08/17/16 00:23  08/17/16 00:23  08/17/16 00:23  08/17/16 00:23











- Medications


Medications: 


 Current Medications





Aspirin (Ecotrin)  [facility]ILY [facility]


   Last Admin: 08/16/16 08:03 Dose:  81 mg


Atorvastatin Calcium (Lipitor)  20 mg PO DAILY [facility]


   Last Admin: 08/16/16 08:03 Dose:  20 mg


Divalproex Sodium (Depakote Dr(*Bid*))  500 mg PO BID [facility]


   Last Admin: 08/16/16 16:38 Dose:  500 mg


Enalapril Maleate (Vasotec)  10 mg PO DAILY [facility]


   Last Admin: 08/16/16 08:04 Dose:  10 mg


Famotidine (Pepcid)  20 mg PO BID [facility]


   Last Admin: 08/16/16 16:38 Dose:  20 mg


Ibuprofen (Motrin Tab)  600 mg PO Q8 PRN


   PRN Reason: Pain, moderate (4-7)


   Last Admin: 07/24/16 21:23 Dose:  600 mg


Metoprolol Tartrate (Lopressor)  50 mg PO Q12 [facility]


   Last Admin: 08/16/16 21:11 Dose:  50 mg


Nicotine (Nicoderm Cq)  1 patch TD DAILY [facility]


   Last Admin: 08/16/16 08:03 Dose:  1 patch


Nitroglycerin (Nitrostat Sl Tab)  0.4 mg SL Q5M PRN


   PRN Reason: cp


   Last Admin: 08/02/16 07:37 Dose:  0.4 mg


Quetiapine Fumarate (Seroquel)  25 mg PO HS [facility]


   Last Admin: 08/16/16 21:11 Dose:  25 mg











- Labs


Labs: 


 





 08/02/16 08:10 





 08/02/16 08:10 





 











PT  11.2 SECONDS (9.4-12.0)   12/14/15  05:20    


 


INR  1.05  (0.89-1.20)   12/14/15  05:20    


 


APTT  36.2 SECONDS (23.1-32.0)  H  12/14/15  05:20    














Assessment and Plan


(1) DVT prophylaxis


Status: Acute





(2) CAD (coronary artery disease)


Status: Acute





(3) Smoking


Status: Chronic





(4) Low back pain


Status: Chronic





(5) Scrotal edema


Status: Chronic





(6) Prediabetes


Status: Chronic





(7) Neurocognitive disorder


Status: Chronic

## 2016-08-17 NOTE — PN
DATE: 08/17/2016



The patient evaluated in bed.  No distress, no complaint.  Pending guardianship.  The patient remains
 unable to make his own decisions.



REVIEW OF SYSTEMS:  Negative.



PHYSICAL EXAMINATION:

GENERAL:  Alert and oriented to his baseline.

HEART:  Regular rate and rhythm.  No murmurs, rubs, or gallops.

LUNGS:  Clear in all fields was bilaterally.

ABDOMEN:  Soft, nontender.  Bowel sounds x 4.

EXTREMITIES:  Distal pulses, motor sensation intact.  Cap refill is brisk.



DIAGNOSES AND PLAN:  Neurocognitive disorder.  Continue medications pending placement.  The patient i
s unable to make decisions for himself.  Coronary artery disease, status post cardiac arrest.  Contin
ue medical management.  Deep venous thrombosis prophylaxis.  Sequential compression devices and antie
mbolism hose.  Nicoderm for smoking cessation.  Chronic intermittent low back pain.  Ibuprofen p.r.n.
  Prediabetes.  Diet and activity.



The patient is to be discharged once guardianship and safe facility established.





__________________________________________

Fan SILVER







cc:



DD: 08/17/2016 08:26:27  [id number]

TT: 08/17/2016 08:51:10

Confirmation # [id number]

Dictation # [id number]

zia

## 2016-08-18 RX ADMIN — DIVALPROEX SODIUM SCH MG: 250 TABLET, DELAYED RELEASE ORAL at 16:27

## 2016-08-18 RX ADMIN — DIVALPROEX SODIUM SCH MG: 250 TABLET, DELAYED RELEASE ORAL at 13:10

## 2016-08-18 NOTE — CP.PCM.PN
Subjective





- Date & Time of Evaluation


Date of Evaluation: 08/18/16


Time of Evaluation: 07:41





- Subjective


Subjective: 


pt comfortable in bed, no distress/complaints. no f/c, n/v/d


pt pending guardianship





Objective





- Vital Signs/Intake and Output


Vital Signs (last 24 hours): 


 











Temp Pulse Resp BP Pulse Ox


 


 97.9 F   71   18   92/59 L  100 


 


 08/18/16 00:15  08/18/16 00:15  08/18/16 00:15  08/18/16 00:15  08/18/16 00:15











- Medications


Medications: 


 Current Medications





Aspirin (Ecotrin)  81 mg PO DAILY [facility]


   Last Admin: 08/17/16 08:08 Dose:  81 mg


Atorvastatin Calcium (Lipitor)  20 mg PO DAILY [facility]


   Last Admin: 08/17/16 08:07 Dose:  20 mg


Divalproex Sodium (Depakote Dr(*Bid*))  500 mg PO BID [facility]


   Last Admin: 08/17/16 17:52 Dose:  500 mg


Enalapril Maleate (Vasotec)  10 mg PO DAILY [facility]


   Last Admin: 08/17/16 08:07 Dose:  10 mg


Famotidine (Pepcid)  20 mg PO BID [facility]


   Last Admin: 08/17/16 17:52 Dose:  20 mg


Ibuprofen (Motrin Tab)  600 mg PO Q8 PRN


   PRN Reason: Pain, moderate (4-7)


   Last Admin: 07/24/16 21:23 Dose:  600 mg


Metoprolol Tartrate (Lopressor)  50 mg PO Q12 [facility]


   Last Admin: 08/17/16 21:20 Dose:  50 mg


Nicotine (Nicoderm Cq)  1 patch TD DAILY [facility]


   Last Admin: 08/17/16 08:07 Dose:  1 patch


Nitroglycerin (Nitrostat Sl Tab)  0.4 mg SL Q5M PRN


   PRN Reason: cp


   Last Admin: 08/02/16 07:37 Dose:  0.4 mg


Quetiapine Fumarate (Seroquel)  25 mg PO HS [facility]


   Last Admin: 08/17/16 21:19 Dose:  25 mg











- Labs


Labs: 


 





 08/02/16 08:10 





 08/02/16 08:10 





 











PT  11.2 SECONDS (9.4-12.0)   12/14/15  05:20    


 


INR  1.05  (0.89-1.20)   12/14/15  05:20    


 


APTT  36.2 SECONDS (23.1-32.0)  H  12/14/15  05:20    














- Constitutional


Appears: Well, Non-toxic, No Acute Distress





- Head Exam


Head Exam: ATRAUMATIC, NORMAL INSPECTION, NORMOCEPHALIC





- Eye Exam


Eye Exam: EOMI, Normal appearance, PERRL


Pupil Exam: NORMAL ACCOMODATION, PERRL





- ENT Exam


ENT Exam: Mucous Membranes Moist, Normal Exam





- Neck Exam


Neck Exam: Full ROM, Normal Inspection.  absent: Lymphadenopathy





- Respiratory Exam


Respiratory Exam: Clear to Ausculation Bilateral, NORMAL BREATHING PATTERN





- Cardiovascular Exam


Cardiovascular Exam: REGULAR RHYTHM, RRR, +S1, +S2.  absent: Murmur





- GI/Abdominal Exam


GI & Abdominal Exam: Soft, Normal Bowel Sounds.  absent: Tenderness





- Extremities Exam


Extremities Exam: Full ROM, Normal Capillary Refill, Normal Inspection.  absent

: Joint Swelling, Pedal Edema





- Back Exam


Back Exam: NORMAL INSPECTION





- Neurological Exam


Neurological Exam: Alert, Awake, CN II-XII Intact, Normal Gait, Oriented x3





- Psychiatric Exam


Psychiatric exam: Normal Affect, Normal Mood





- Skin


Skin Exam: Dry, Intact, Normal Color, Warm





Assessment and Plan


(1) DVT prophylaxis


Assessment & Plan: 


scd nad aehose


ambulation


Status: Acute





(2) CAD (coronary artery disease)


Assessment & Plan: 


cont meds


Status: Acute





(3) Smoking


Assessment & Plan: 


nicoderm


Status: Chronic





(4) Low back pain


Assessment & Plan: 


ibuprofne prn


Status: Chronic





(5) Scrotal edema


Status: Chronic





(6) Prediabetes


Assessment & Plan: 


diet exercise


Status: Chronic





(7) Neurocognitive disorder


Assessment & Plan: 


cont meds


pending guardianship


Status: Chronic

## 2016-08-19 RX ADMIN — DIVALPROEX SODIUM SCH MG: 250 TABLET, DELAYED RELEASE ORAL at 16:06

## 2016-08-19 RX ADMIN — DIVALPROEX SODIUM SCH MG: 250 TABLET, DELAYED RELEASE ORAL at 08:38

## 2016-08-19 NOTE — CP.PCM.PN
Subjective





- Date & Time of Evaluation


Date of Evaluation: 08/19/16


Time of Evaluation: 09:32





- Subjective


Subjective: 


doign well, no compalints


no f/c, n/v/d


ros negative


pendign guardianship





Objective





- Vital Signs/Intake and Output


Vital Signs (last 24 hours): 


 











Temp Pulse Resp BP Pulse Ox


 


 97.1 F L  67   20   98/63 L  99 


 


 08/19/16 08:41  08/19/16 08:41  08/19/16 08:41  08/19/16 08:41  08/19/16 08:41











- Medications


Medications: 


 Current Medications





Aspirin (Ecotrin)  81 mg PO DAILY [facility]


   Last Admin: 08/19/16 08:38 Dose:  81 mg


Atorvastatin Calcium (Lipitor)  20 mg PO DAILY [facility]


   Last Admin: 08/19/16 08:38 Dose:  20 mg


Divalproex Sodium (Depakote Dr(*Bid*))  500 mg PO BID [facility]


   Last Admin: 08/19/16 08:38 Dose:  500 mg


Enalapril Maleate (Vasotec)  10 mg PO DAILY [facility]


   Last Admin: 08/19/16 08:39 Dose:  10 mg


Famotidine (Pepcid)  20 mg PO BID [facility]


   Last Admin: 08/19/16 08:39 Dose:  20 mg


Ibuprofen (Motrin Tab)  600 mg PO Q8 PRN


   PRN Reason: Pain, moderate (4-7)


   Last Admin: 07/24/16 21:23 Dose:  600 mg


Metoprolol Tartrate (Lopressor)  50 mg PO Q12 [facility]


   Last Admin: 08/19/16 08:39 Dose:  50 mg


Nicotine (Nicoderm Cq)  1 patch TD DAILY [facility]


   Last Admin: 08/19/16 08:38 Dose:  1 patch


Nitroglycerin (Nitrostat Sl Tab)  0.4 mg SL Q5M PRN


   PRN Reason: cp


   Last Admin: 08/02/16 07:37 Dose:  0.4 mg


Quetiapine Fumarate (Seroquel)  25 mg PO HS [facility]


   Last Admin: 08/18/16 21:02 Dose:  25 mg











- Labs


Labs: 


 





 08/02/16 08:10 





 08/02/16 08:10 





 











PT  11.2 SECONDS (9.4-12.0)   12/14/15  05:20    


 


INR  1.05  (0.89-1.20)   12/14/15  05:20    


 


APTT  36.2 SECONDS (23.1-32.0)  H  12/14/15  05:20    














- Constitutional


Appears: Well, Non-toxic, No Acute Distress





- Head Exam


Head Exam: ATRAUMATIC, NORMAL INSPECTION, NORMOCEPHALIC





- Eye Exam


Eye Exam: EOMI, Normal appearance, PERRL


Pupil Exam: NORMAL ACCOMODATION, PERRL





- ENT Exam


ENT Exam: Mucous Membranes Moist, Normal Exam





- Neck Exam


Neck Exam: Full ROM, Normal Inspection.  absent: Lymphadenopathy





- Respiratory Exam


Respiratory Exam: Clear to Ausculation Bilateral, NORMAL BREATHING PATTERN





- Cardiovascular Exam


Cardiovascular Exam: REGULAR RHYTHM, RRR, +S1, +S2.  absent: Murmur





- GI/Abdominal Exam


GI & Abdominal Exam: Soft, Normal Bowel Sounds.  absent: Tenderness





- Extremities Exam


Extremities Exam: Full ROM, Normal Capillary Refill, Normal Inspection.  absent

: Joint Swelling, Pedal Edema





- Back Exam


Back Exam: NORMAL INSPECTION





- Neurological Exam


Neurological Exam: Alert, Awake, CN II-XII Intact, Normal Gait, Oriented x3





- Psychiatric Exam


Psychiatric exam: Normal Affect, Normal Mood





- Skin


Skin Exam: Dry, Intact, Normal Color, Warm





Assessment and Plan


(1) DVT prophylaxis


Assessment & Plan: 


scd nad aehose, ambulation


Status: Chronic





(2) CAD (coronary artery disease)


Assessment & Plan: 


cont meds


Status: Chronic





(3) Smoking


Assessment & Plan: 


nicoderm


Status: Chronic





(4) Low back pain


Assessment & Plan: 


ibuprofne prn


Status: Chronic





(5) Scrotal edema


Status: Chronic





(6) Prediabetes


Assessment & Plan: 


diet/exercise


Status: Chronic





(7) Neurocognitive disorder


Assessment & Plan: 


cont meds


Status: Chronic

## 2016-08-20 RX ADMIN — DIVALPROEX SODIUM SCH MG: 250 TABLET, DELAYED RELEASE ORAL at 08:32

## 2016-08-20 RX ADMIN — DIVALPROEX SODIUM SCH MG: 250 TABLET, DELAYED RELEASE ORAL at 16:50

## 2016-08-20 NOTE — CP.PCM.PN
Subjective





- Date & Time of Evaluation


Date of Evaluation: 08/20/16


Time of Evaluation: 16:31





- Subjective


Subjective: 


dictated note


calm and cooperative


no f/c, n/v/d


pending guardianship





Objective





- Vital Signs/Intake and Output


Vital Signs (last 24 hours): 


 











Temp Pulse Resp BP Pulse Ox


 


 97 F L  87   18   116/64   99 


 


 08/20/16 16:31  08/20/16 16:31  08/20/16 16:31  08/20/16 16:31  08/20/16 16:31











- Medications


Medications: 


 Current Medications





Aspirin (Ecotrin)  81 mg PO DAILY [facility]


   Last Admin: 08/20/16 08:32 Dose:  81 mg


Atorvastatin Calcium (Lipitor)  20 mg PO DAILY [facility]


   Last Admin: 08/20/16 08:32 Dose:  20 mg


Divalproex Sodium (Depakote Dr(*Bid*))  500 mg PO BID [facility]


   Last Admin: 08/20/16 08:32 Dose:  500 mg


Enalapril Maleate (Vasotec)  10 mg PO DAILY [facility]


   Last Admin: 08/20/16 08:32 Dose:  10 mg


Famotidine (Pepcid)  20 mg PO BID [facility]


   Last Admin: 08/20/16 08:32 Dose:  20 mg


Ibuprofen (Motrin Tab)  600 mg PO Q8 PRN


   PRN Reason: Pain, moderate (4-7)


   Last Admin: 07/24/16 21:23 Dose:  600 mg


Metoprolol Tartrate (Lopressor)  50 mg PO Q12 [facility]


   Last Admin: 08/20/16 08:31 Dose:  50 mg


Nicotine (Nicoderm Cq)  1 patch TD DAILY [facility]


   Last Admin: 08/20/16 08:31 Dose:  1 patch


Nitroglycerin (Nitrostat Sl Tab)  0.4 mg SL Q5M PRN


   PRN Reason: cp


   Last Admin: 08/02/16 07:37 Dose:  0.4 mg


Quetiapine Fumarate (Seroquel)  25 mg PO HS [facility]


   Last Admin: 08/19/16 21:04 Dose:  25 mg











- Labs


Labs: 


 





 08/02/16 08:10 





 08/02/16 08:10 





 











PT  11.2 SECONDS (9.4-12.0)   12/14/15  05:20    


 


INR  1.05  (0.89-1.20)   12/14/15  05:20    


 


APTT  36.2 SECONDS (23.1-32.0)  H  12/14/15  05:20    














Assessment and Plan


(1) DVT prophylaxis


Status: Chronic





(2) CAD (coronary artery disease)


Status: Chronic





(3) Smoking


Status: Chronic





(4) Low back pain


Status: Chronic





(5) Scrotal edema


Status: Chronic





(6) Prediabetes


Status: Chronic





(7) Neurocognitive disorder


Status: Chronic

## 2016-08-20 NOTE — PN
DATE: 08/20/2016



The patient evaluated in bed.  No distress.  No complaints.  No fever, chills, 
nausea, vomiting, diarrhea, pending guardianship.



REVIEW OF SYSTEMS:  Negative.



OBJECTIVE FINDINGS:

GENERAL:  At baseline.

HEART:  Regular rate and rhythm.  No murmurs, rubs, or gallops.

LUNGS:  Clear in all fields bilaterally.

ABDOMEN:  Soft, nontender.  Bowel sounds x 4.

EXTREMITIES:  Distal pulses, motor, sensation intact.  Cap refill is brisk.  



DIAGNOSESAND PLAN:  Coronary artery disease, cont meds.  neurocognitive d/o, 
continue medicines.  low back pain, ibuprofen p.r.n.  Smoking cessation, 
Nicoderm patch.  We will continue to follow for guardianship and placement.





__________________________________________

Fan SILVER







cc:   



DD: 08/20/2016 17:13:39  [id number]

TT: 08/20/2016 17:42:15

Confirmation # [id number]

Dictation # [id number]

tn

MTDD

## 2016-08-21 RX ADMIN — DIVALPROEX SODIUM SCH MG: 250 TABLET, DELAYED RELEASE ORAL at 09:54

## 2016-08-21 RX ADMIN — DIVALPROEX SODIUM SCH MG: 250 TABLET, DELAYED RELEASE ORAL at 17:49

## 2016-08-21 NOTE — CP.PCM.PN
Subjective





- Date & Time of Evaluation


Date of Evaluation: 08/21/16


Time of Evaluation: 07:57





- Subjective


Subjective: 


calm and cooperative. no distres


no f/c, n/v/d


pending guardianship





Objective





- Vital Signs/Intake and Output


Vital Signs (last 24 hours): 


 











Temp Pulse Resp BP Pulse Ox


 


 97.3 F L  71   19   103/65   97 


 


 08/21/16 01:00  08/21/16 01:00  08/21/16 01:00  08/21/16 01:00  08/21/16 01:00











- Medications


Medications: 


 Current Medications





Aspirin (Ecotrin)  81 mg PO DAILY [facility]


   Last Admin: 08/20/16 08:32 Dose:  81 mg


Atorvastatin Calcium (Lipitor)  20 mg PO DAILY [facility]


   Last Admin: 08/20/16 08:32 Dose:  20 mg


Divalproex Sodium (Depakote Dr(*Bid*))  500 mg PO BID formerly [facility]


   Last Admin: 08/20/16 16:50 Dose:  500 mg


Enalapril Maleate (Vasotec)  10 mg PO DAILY formerly [facility]


   Last Admin: 08/20/16 08:32 Dose:  10 mg


Famotidine (Pepcid)  20 mg PO BID formerly [facility]


   Last Admin: 08/20/16 16:50 Dose:  20 mg


Ibuprofen (Motrin Tab)  600 mg PO Q8 PRN


   PRN Reason: Pain, moderate (4-7)


   Last Admin: 07/24/16 21:23 Dose:  600 mg


Metoprolol Tartrate (Lopressor)  50 mg PO Q12 formerly [facility]


   Last Admin: 08/20/16 21:37 Dose:  50 mg


Nicotine (Nicoderm Cq)  1 patch TD DAILY formerly [facility]


   Last Admin: 08/20/16 08:31 Dose:  1 patch


Nitroglycerin (Nitrostat Sl Tab)  0.4 mg SL Q5M PRN


   PRN Reason: cp


   Last Admin: 08/02/16 07:37 Dose:  0.4 mg


Quetiapine Fumarate (Seroquel)  25 mg PO HS formerly [facility]


   Last Admin: 08/20/16 21:37 Dose:  25 mg











- Labs


Labs: 


 





 08/02/16 08:10 





 08/02/16 08:10 





 











PT  11.2 SECONDS (9.4-12.0)   12/14/15  05:20    


 


INR  1.05  (0.89-1.20)   12/14/15  05:20    


 


APTT  36.2 SECONDS (23.1-32.0)  H  12/14/15  05:20    














- Constitutional


Appears: Well, Non-toxic, No Acute Distress





- Head Exam


Head Exam: ATRAUMATIC, NORMAL INSPECTION, NORMOCEPHALIC





- Eye Exam


Eye Exam: EOMI, Normal appearance, PERRL


Pupil Exam: NORMAL ACCOMODATION, PERRL





- ENT Exam


ENT Exam: Mucous Membranes Moist, Normal Exam





- Neck Exam


Neck Exam: Full ROM, Normal Inspection.  absent: Lymphadenopathy





- Respiratory Exam


Respiratory Exam: Clear to Ausculation Bilateral, NORMAL BREATHING PATTERN





- Cardiovascular Exam


Cardiovascular Exam: REGULAR RHYTHM, +S1, +S2.  absent: Murmur





- GI/Abdominal Exam


GI & Abdominal Exam: Soft, Normal Bowel Sounds.  absent: Tenderness





- Extremities Exam


Extremities Exam: Full ROM, Normal Capillary Refill, Normal Inspection.  absent

: Joint Swelling, Pedal Edema





- Back Exam


Back Exam: NORMAL INSPECTION





- Neurological Exam


Neurological Exam: Alert, Awake, CN II-XII Intact, Normal Gait, Oriented x3





- Psychiatric Exam


Psychiatric exam: Normal Affect, Normal Mood





- Skin


Skin Exam: Dry, Intact, Normal Color, Warm





Assessment and Plan


(1) DVT prophylaxis


Assessment & Plan: 


scd and ae hose


ambulation


Status: Chronic





(2) CAD (coronary artery disease)


Assessment & Plan: 


cont meds


Status: Chronic





(3) Smoking


Assessment & Plan: 


n icoderm


Status: Chronic





(4) Low back pain


Assessment & Plan: 


ibuprofen prn


Status: Chronic





(5) Scrotal edema


Status: Chronic





(6) Prediabetes


Assessment & Plan: 


dietary control


Status: Chronic





(7) Neurocognitive disorder


Assessment & Plan: 


cont meds


pending guardianship for dc


Status: Chronic

## 2016-08-22 RX ADMIN — DIVALPROEX SODIUM SCH MG: 250 TABLET, DELAYED RELEASE ORAL at 09:45

## 2016-08-22 RX ADMIN — DIVALPROEX SODIUM SCH MG: 250 TABLET, DELAYED RELEASE ORAL at 18:30

## 2016-08-22 NOTE — CP.PCM.PN
Subjective





- Date & Time of Evaluation


Date of Evaluation: 08/22/16


Time of Evaluation: 07:14





- Subjective


Subjective: 


dictated note [id number]


pendign guardianship


no f/c, n/v/d





Objective





- Vital Signs/Intake and Output


Vital Signs (last 24 hours): 


 











Temp Pulse Resp BP Pulse Ox


 


 97.8 F   64   18   92/54 L  97 


 


 08/22/16 01:00  08/22/16 01:00  08/22/16 01:00  08/22/16 01:00  08/22/16 01:00











- Medications


Medications: 


 Current Medications





Aspirin (Ecotrin)  81 mg PO DAILY [facility]


   Last Admin: 08/21/16 09:55 Dose:  81 mg


Atorvastatin Calcium (Lipitor)  20 mg PO DAILY [facility]


   Last Admin: 08/21/16 09:54 Dose:  20 mg


Divalproex Sodium (Depakote Dr(*Bid*))  500 mg PO BID [facility]


   Last Admin: 08/21/16 17:49 Dose:  500 mg


Enalapril Maleate (Vasotec)  10 mg PO DAILY [facility]


   Last Admin: 08/21/16 09:54 Dose:  10 mg


Famotidine (Pepcid)  20 mg PO BID [facility]


   Last Admin: 08/21/16 17:49 Dose:  20 mg


Ibuprofen (Motrin Tab)  600 mg PO Q8 PRN


   PRN Reason: Pain, moderate (4-7)


   Last Admin: 07/24/16 21:23 Dose:  600 mg


Metoprolol Tartrate (Lopressor)  50 mg PO Q12 [facility]


   Last Admin: 08/21/16 21:46 Dose:  50 mg


Nicotine (Nicoderm Cq)  1 patch TD DAILY [facility]


   Last Admin: 08/21/16 09:59 Dose:  Not Given


Nitroglycerin (Nitrostat Sl Tab)  0.4 mg SL Q5M PRN


   PRN Reason: cp


   Last Admin: 08/02/16 07:37 Dose:  0.4 mg


Quetiapine Fumarate (Seroquel)  25 mg PO HS [facility]


   Last Admin: 08/21/16 21:46 Dose:  25 mg











- Labs


Labs: 


 





 08/02/16 08:10 





 08/02/16 08:10 





 











PT  11.2 SECONDS (9.4-12.0)   12/14/15  05:20    


 


INR  1.05  (0.89-1.20)   12/14/15  05:20    


 


APTT  36.2 SECONDS (23.1-32.0)  H  12/14/15  05:20    














Assessment and Plan


(1) DVT prophylaxis


Status: Chronic





(2) CAD (coronary artery disease)


Status: Chronic





(3) Smoking


Status: Chronic





(4) Low back pain


Status: Chronic





(5) Scrotal edema


Status: Chronic





(6) Prediabetes


Status: Chronic





(7) Neurocognitive disorder


Status: Chronic

## 2016-08-22 NOTE — PN
DATE: 08/22/2016



The patient is evaluated in bed.  No distress.  No complications.  No fever, chills, nausea, vomiting
, diarrhea.  Remains pending guardianship.



REVIEW OF SYSTEMS:  Negative.



PHYSICAL EXAMINATION:

GENERAL:  Alert and oriented to his baseline.

HEART:  Regular rate and rhythm.  No murmurs, rubs, or gallops.

LUNGS:  Clear in all fields bilaterally.

ABDOMEN:  Soft, nontender.  Bowel sounds x 4.

EXTREMITIES:  Distal pulses, motor and sensation intact.  Cap refill is brisk.



DIAGNOSES AND PLAN:  

1.  Neurocognitive disorder.  Continue medicine.

2.  Coronary artery disease, status post cardiac arrest.  Continue medicines.  

3.  Deep venous thrombosis prophylaxis.  SCD and AE hose.

4.  Smoking.  Nicoderm patch.

5.  Low back pain.  Intermittent Motrin p.r.n. 

6.  Prediabetes.  Dietary control.



Continue to monitor patient until such time that he has a guardian and has safe disposition and locat
ion.





__________________________________________

Fan SILVER







cc:



DD: 08/22/2016 08:26:25  1505

TT: 08/22/2016 08:54:17

Confirmation # [id number]

Dictation # [id number]

mn

## 2016-08-23 RX ADMIN — DIVALPROEX SODIUM SCH MG: 250 TABLET, DELAYED RELEASE ORAL at 16:44

## 2016-08-23 RX ADMIN — DIVALPROEX SODIUM SCH MG: 250 TABLET, DELAYED RELEASE ORAL at 08:37

## 2016-08-23 NOTE — CP.PCM.PN
Subjective





- Date & Time of Evaluation


Date of Evaluation: 08/23/16


Time of Evaluation: 07:09





- Subjective


Subjective: 


dictated note [id number]


pending guardianship


no f/c, n/v/d





Objective





- Vital Signs/Intake and Output


Vital Signs (last 24 hours): 


 











Temp Pulse Resp BP Pulse Ox


 


 98.1 F   73   20   111/68   100 


 


 08/22/16 15:30  08/22/16 21:24  08/22/16 15:30  08/22/16 21:24  08/22/16 15:30











- Medications


Medications: 


 Current Medications





Aspirin (Ecotrin)  81 mg PO DAILY [facility]


   Last Admin: 08/22/16 09:46 Dose:  81 mg


Atorvastatin Calcium (Lipitor)  20 mg PO DAILY [facility]


   Last Admin: 08/22/16 09:45 Dose:  20 mg


Divalproex Sodium (Depakote Dr(*Bid*))  500 mg PO BID [facility]


   Last Admin: 08/22/16 18:30 Dose:  500 mg


Enalapril Maleate (Vasotec)  10 mg PO DAILY [facility]


   Last Admin: 08/22/16 09:46 Dose:  10 mg


Famotidine (Pepcid)  20 mg PO BID [facility]


   Last Admin: 08/22/16 18:30 Dose:  20 mg


Ibuprofen (Motrin Tab)  600 mg PO Q8 PRN


   PRN Reason: Pain, moderate (4-7)


   Last Admin: 07/24/16 21:23 Dose:  600 mg


Metoprolol Tartrate (Lopressor)  50 mg PO Q12 [facility]


   Last Admin: 08/22/16 21:24 Dose:  50 mg


Nicotine (Nicoderm Cq)  1 patch TD DAILY [facility]


   Last Admin: 08/22/16 09:46 Dose:  Not Given


Nitroglycerin (Nitrostat Sl Tab)  0.4 mg SL Q5M PRN


   PRN Reason: cp


   Last Admin: 08/02/16 07:37 Dose:  0.4 mg


Quetiapine Fumarate (Seroquel)  25 mg PO HS [facility]


   Last Admin: 08/22/16 21:24 Dose:  25 mg











- Labs


Labs: 


 





 08/02/16 08:10 





 08/02/16 08:10 





 











PT  11.2 SECONDS (9.4-12.0)   12/14/15  05:20    


 


INR  1.05  (0.89-1.20)   12/14/15  05:20    


 


APTT  36.2 SECONDS (23.1-32.0)  H  12/14/15  05:20    














Assessment and Plan


(1) DVT prophylaxis


Status: Chronic





(2) CAD (coronary artery disease)


Status: Chronic





(3) Smoking


Status: Chronic





(4) Low back pain


Status: Chronic





(5) Scrotal edema


Status: Chronic





(6) Prediabetes


Status: Chronic





(7) Neurocognitive disorder


Status: Chronic

## 2016-08-23 NOTE — PN
DATE: 08/23/2016



The patient is evaluated in bed.  No distress.  No complaints, pending guardianship.  No fever, chill
s, nausea, vomiting, or diarrhea.



REVIEW OF SYSTEMS:  Negative.



PHYSICAL EXAMINATION:

GENERAL:  Alert and oriented to his baseline.

HEART:  Regular rate and rhythm.  No murmurs, rubs, or gallops.

LUNGS:  Clear in all fields bilaterally.

ABDOMEN:  Soft, nontender.  Bowel sounds x 4.

EXTREMITIES:  Distal pulses, motor sensation intact.  Cap refill is brisk.



DIAGNOSES AND PLAN:

1.  Neurocognitive disorder.  Continue medicine.

2.  Coronary artery disease, status post cardiac arrest.  Continue medicines.

3.  Deep venous thrombosis prophylaxis.  Sequential compression devices and antiembolism hose, ambula
tion.

4.  Intermittent low back pain.  Ibuprofen p.r.n.

5.  Prediabetes.  Dietary control.

6.  Smoking.  Nicoderm patch.



We will continue to monitor for guardianship and disposition.





__________________________________________

Fan SILVER







cc:



DD: 08/23/2016 08:23:12  1505

TT: 08/23/2016 09:01:11

Confirmation # [id number]

Dictation # [id number]

zia

## 2016-08-24 RX ADMIN — DIVALPROEX SODIUM SCH MG: 250 TABLET, DELAYED RELEASE ORAL at 16:41

## 2016-08-24 RX ADMIN — DIVALPROEX SODIUM SCH MG: 250 TABLET, DELAYED RELEASE ORAL at 08:50

## 2016-08-24 NOTE — CP.PCM.PN
Subjective





- Date & Time of Evaluation


Date of Evaluation: 08/24/16


Time of Evaluation: 07:22





- Subjective


Subjective: 


dictated note [id number]


pending guardiansship


no f/c, n/v/d





Objective





- Vital Signs/Intake and Output


Vital Signs (last 24 hours): 


 











Temp Pulse Resp BP Pulse Ox


 


 98.1 F   69   19   118/71   100 


 


 08/24/16 00:00  08/24/16 00:00  08/24/16 00:00  08/24/16 00:00  08/24/16 00:00











- Medications


Medications: 


 Current Medications





Aspirin (Ecotrin)  81 mg PO DAILY [facility]


   Last Admin: 08/23/16 08:35 Dose:  81 mg


Atorvastatin Calcium (Lipitor)  20 mg PO DAILY [facility]


   Last Admin: 08/23/16 08:37 Dose:  20 mg


Divalproex Sodium (Depakote Dr(*Bid*))  500 mg PO BID [facility]


   Last Admin: 08/23/16 16:44 Dose:  500 mg


Enalapril Maleate (Vasotec)  10 mg PO DAILY [facility]


   Last Admin: 08/23/16 08:36 Dose:  10 mg


Famotidine (Pepcid)  20 mg PO BID [facility]


   Last Admin: 08/23/16 16:43 Dose:  20 mg


Ibuprofen (Motrin Tab)  600 mg PO Q8 PRN


   PRN Reason: Pain, moderate (4-7)


   Last Admin: 07/24/16 21:23 Dose:  600 mg


Metoprolol Tartrate (Lopressor)  50 mg PO Q12 [facility]


   Last Admin: 08/23/16 20:56 Dose:  50 mg


Nicotine (Nicoderm Cq)  1 patch TD DAILY [facility]


   Last Admin: 08/23/16 08:35 Dose:  1 patch


Nitroglycerin (Nitrostat Sl Tab)  0.4 mg SL Q5M PRN


   PRN Reason: cp


   Last Admin: 08/02/16 07:37 Dose:  0.4 mg


Quetiapine Fumarate (Seroquel)  25 mg PO HS [facility]


   Last Admin: 08/23/16 20:59 Dose:  25 mg











- Labs


Labs: 


 





 08/02/16 08:10 





 08/02/16 08:10 





 











PT  11.2 SECONDS (9.4-12.0)   12/14/15  05:20    


 


INR  1.05  (0.89-1.20)   12/14/15  05:20    


 


APTT  36.2 SECONDS (23.1-32.0)  H  12/14/15  05:20    














Assessment and Plan


(1) DVT prophylaxis


Status: Chronic





(2) CAD (coronary artery disease)


Status: Chronic





(3) Smoking


Status: Chronic





(4) Low back pain


Status: Chronic





(5) Scrotal edema


Status: Chronic





(6) Prediabetes


Status: Chronic





(7) Neurocognitive disorder


Status: Chronic

## 2016-08-24 NOTE — PN
DATE: 08/24/2016



SUBJECTIVE:  The patient evaluated in bed, no distress.  No fever, chills, 
nausea, vomiting, or diarrhea.  Remains in a guardianship.



REVIEW OF SYSTEMS:  Negative at this time.



PHYSICAL EXAMINATION:

GENERAL:  Alert and oriented at his baseline.

HEART:  Regular rate and rhythm.  No murmurs, rubs, or gallops.

LUNGS:  Clear in all fields was bilaterally.

ABDOMEN:  Soft, nontender.  Bowel sounds x 4.

EXTREMITIES:  Distal pulses, motor and sensation intact.  Cap refill is brisk.



DIAGNOSES:

1.  Coronary artery disease, status post cardiac arrest cont medicines.  
Pending guardianship.

2.  Deep venous thrombosis prophylaxis.  SCD and AE hose, ambulation. 

3.  Chronic low back pain, ibuprofen p.r.n.

4.  Prediabetes.  Continue dietary modifications.



We will continue to monitor the patient until guardianship is established and 
disposition is established for  the patient.





__________________________________________

Fan SILVER







cc:   



DD: 08/24/2016 08:08:20  1505

TT: 08/24/2016 08:43:32

Confirmation # [id number]

Dictation # [id number]

sn

MTDD

## 2016-08-25 RX ADMIN — DIVALPROEX SODIUM SCH MG: 250 TABLET, DELAYED RELEASE ORAL at 16:35

## 2016-08-25 RX ADMIN — DIVALPROEX SODIUM SCH MG: 250 TABLET, DELAYED RELEASE ORAL at 09:53

## 2016-08-25 NOTE — CP.PCM.PN
Subjective





- Date & Time of Evaluation


Date of Evaluation: 08/25/16


Time of Evaluation: 07:20





- Subjective


Subjective: 


dictated note [id number]


no distress


pending guardianship


no f/c, n/v/d





Objective





- Vital Signs/Intake and Output


Vital Signs (last 24 hours): 


 











Temp Pulse Resp BP Pulse Ox


 


 98.1 F   69   19   109/74   97 


 


 08/25/16 00:30  08/25/16 00:30  08/25/16 00:30  08/25/16 00:30  08/25/16 00:30











- Medications


Medications: 


 Current Medications





Aspirin (Ecotrin)  81 mg PO DAILY [facility]


   Last Admin: 08/24/16 08:47 Dose:  81 mg


Atorvastatin Calcium (Lipitor)  20 mg PO DAILY [facility]


   Last Admin: 08/24/16 08:50 Dose:  20 mg


Divalproex Sodium (Depakote Dr(*Bid*))  500 mg PO BID [facility]


   Last Admin: 08/24/16 16:41 Dose:  500 mg


Enalapril Maleate (Vasotec)  10 mg PO DAILY [facility]


   Last Admin: 08/24/16 08:48 Dose:  10 mg


Famotidine (Pepcid)  20 mg PO BID [facility]


   Last Admin: 08/24/16 16:42 Dose:  20 mg


Ibuprofen (Motrin Tab)  600 mg PO Q8 PRN


   PRN Reason: Pain, moderate (4-7)


   Last Admin: 07/24/16 21:23 Dose:  600 mg


Metoprolol Tartrate (Lopressor)  50 mg PO Q12 [facility]


   Last Admin: 08/24/16 20:39 Dose:  50 mg


Nicotine (Nicoderm Cq)  1 patch TD DAILY [facility]


   Last Admin: 08/24/16 08:48 Dose:  1 patch


Nitroglycerin (Nitrostat Sl Tab)  0.4 mg SL Q5M PRN


   PRN Reason: cp


   Last Admin: 08/02/16 07:37 Dose:  0.4 mg


Quetiapine Fumarate (Seroquel)  25 mg PO HS [facility]


   Last Admin: 08/24/16 21:20 Dose:  25 mg











- Labs


Labs: 


 





 08/02/16 08:10 





 08/02/16 08:10 





 











PT  11.2 SECONDS (9.4-12.0)   12/14/15  05:20    


 


INR  1.05  (0.89-1.20)   12/14/15  05:20    


 


APTT  36.2 SECONDS (23.1-32.0)  H  12/14/15  05:20    














Assessment and Plan


(1) DVT prophylaxis


Status: Chronic





(2) CAD (coronary artery disease)


Status: Chronic





(3) Smoking


Status: Chronic





(4) Low back pain


Status: Chronic





(5) Scrotal edema


Status: Chronic





(6) Prediabetes


Status: Chronic





(7) Neurocognitive disorder


Status: Chronic

## 2016-08-25 NOTE — PN
DATE: 08/25/2016



The patient evaluated in bed, in no distress.  No complaints.  Pending guardianship.  No fever, chill
s, nausea, vomiting, diarrhea.



REVIEW OF SYSTEMS:  Negative.



PHYSICAL EXAMINATION:

GENERAL:  Alert and oriented, at baseline.

HEART:  Regular rate and rhythm.  No murmurs, rubs, or gallops.

LUNGS:  Clear in all fields bilaterally.

ABDOMEN:  Soft, nontender.  Bowel sounds x 4.

EXTREMITIES:  Distal pulses, motor sensation intact.  Cap refill is brisk.



DIAGNOSES AND PLAN:

1.  Coronary artery disease status post cardiac arrest.  Continue medicines.

2.  Neurocognitive disorder.  Continue medicines pending guardianship.

3.  Smoker.  NicoDerm patch.

4.  Intermittent low back pain.  Ibuprofen p.r.n.

5.  Deep venous thrombosis prophylaxis.  Sequential compression devices and antiembolism hose, ambula
tion.

6.  Prediabetes.  Diet and exercise.



We will continue to monitor as well as disposition and guardianship established.





__________________________________________

Fan SILVER







cc:



DD: 08/25/2016 07:56:38  1505

TT: 08/25/2016 08:39:10

Confirmation # [id number]

Dictation # [id number]

zia

## 2016-08-26 RX ADMIN — DIVALPROEX SODIUM SCH MG: 250 TABLET, DELAYED RELEASE ORAL at 09:24

## 2016-08-26 RX ADMIN — DIVALPROEX SODIUM SCH MG: 250 TABLET, DELAYED RELEASE ORAL at 17:49

## 2016-08-26 NOTE — CP.PCM.PN
Subjective





- Date & Time of Evaluation


Date of Evaluation: 08/26/16


Time of Evaluation: 12:32





- Subjective


Subjective: 


calm adn cooperativ


germain f/c, n/v/d


[facility] 


ros [facility]





Objective





- Vital Signs/Intake and Output


Vital Signs (last 24 hours): 


 











Temp Pulse Resp BP Pulse Ox


 


 96.8 F L  74   20   92/60 L  100 


 


 08/26/16 07:58  08/26/16 07:58  08/26/16 07:58  08/26/16 09:24  08/26/16 07:58











- Medications


Medications: 


 Current Medications





Aspirin (Ecotrin)  81 mg PO DAILY [facility]


   Last Admin: 08/26/16 09:23 Dose:  81 mg


Atorvastatin Calcium (Lipitor)  20 mg PO DAILY [facility]


   Last Admin: 08/26/16 09:23 Dose:  20 mg


Divalproex Sodium (Depakote Dr(*Bid*))  500 mg PO BID [facility]


   Last Admin: 08/26/16 09:24 Dose:  500 mg


Enalapril Maleate (Vasotec)  10 mg PO DAILY [facility]


   Last Admin: 08/26/16 09:25 Dose:  Not Given


Famotidine (Pepcid)  20 mg PO BID [facility]


   Last Admin: 08/26/16 09:23 Dose:  20 mg


Ibuprofen (Motrin Tab)  600 mg PO Q8 PRN


   PRN Reason: Pain, moderate (4-7)


   Last Admin: 07/24/16 21:23 Dose:  600 mg


Metoprolol Tartrate (Lopressor)  50 mg PO Q12 [facility]


   Last Admin: 08/26/16 09:24 Dose:  Not Given


Nicotine (Nicoderm Cq)  1 patch TD DAILY [facility]


   Last Admin: 08/26/16 09:25 Dose:  1 patch


Nitroglycerin (Nitrostat Sl Tab)  0.4 mg SL Q5M PRN


   PRN Reason: cp


   Last Admin: 08/02/16 07:37 Dose:  0.4 mg


Quetiapine Fumarate (Seroquel)  25 mg PO HS [facility]


   Last Admin: 08/25/16 21:21 Dose:  25 mg











- Labs


Labs: 


 





 08/02/16 08:10 





 08/02/16 08:10 





 











PT  11.2 SECONDS (9.4-12.0)   12/14/15  05:20    


 


INR  1.05  (0.89-1.20)   12/14/15  05:20    


 


APTT  36.2 SECONDS (23.1-32.0)  H  12/14/15  05:20    














- Constitutional


Appears: Well, Non-toxic, No Acute Distress





- Head Exam


Head Exam: ATRAUMATIC, NORMAL INSPECTION, NORMOCEPHALIC





- Eye Exam


Eye Exam: EOMI, Normal appearance, PERRL


Pupil Exam: NORMAL ACCOMODATION, PERRL





- ENT Exam


ENT Exam: Mucous Membranes Moist, Normal Exam





- Neck Exam


Neck Exam: Full ROM, Normal Inspection.  absent: Lymphadenopathy





- Respiratory Exam


Respiratory Exam: Clear to Ausculation Bilateral, NORMAL BREATHING PATTERN





- Cardiovascular Exam


Cardiovascular Exam: REGULAR RHYTHM, RRR, +S1, +S2.  absent: Murmur





- GI/Abdominal Exam


GI & Abdominal Exam: Soft, Normal Bowel Sounds.  absent: Tenderness





- Extremities Exam


Extremities Exam: Full ROM, Normal Capillary Refill, Normal Inspection.  absent

: Joint Swelling, Pedal Edema





- Back Exam


Back Exam: Full ROM, NORMAL INSPECTION





- Neurological Exam


Neurological Exam: Alert, Awake, CN II-XII Intact, Normal Gait, Oriented x3





- Psychiatric Exam


Psychiatric exam: Normal Affect, Normal Mood





- Skin


Skin Exam: Dry, Intact, Normal Color, Warm





Assessment and Plan


(1) DVT prophylaxis


Assessment & Plan: 


scd and ae hose


ambuilation


Status: Chronic





(2) CAD (coronary artery disease)


Assessment & Plan: 


cont meds


Status: Chronic





(3) Smoking


Assessment & Plan: 


nicoderm


Status: Chronic





(4) Low back pain


Assessment & Plan: 


ibuprofen prn


Status: Chronic





(5) Scrotal edema


Status: Chronic





(6) Prediabetes


Assessment & Plan: 


dietary control


Status: Chronic





(7) Neurocognitive disorder


Assessment & Plan: 


cont meds


Status: Chronic

## 2016-08-27 RX ADMIN — DIVALPROEX SODIUM SCH MG: 250 TABLET, DELAYED RELEASE ORAL at 17:43

## 2016-08-27 RX ADMIN — DIVALPROEX SODIUM SCH MG: 250 TABLET, DELAYED RELEASE ORAL at 10:53

## 2016-08-27 NOTE — PN
DATE: 08/27/2016



SUBJECTIVE:  The patient evaluated in bed, no distress.  No complaints.  No f/c
, n/v/d.  The patient remains pending guardianship as he remains unable to make 
decisions for himself.  



REVIEW OF SYSTEMS:  Negative.



PHYSICAL EXAMINATION:

GENERAL:  Alert and oriented at his baseline.

HEART:  Regular rate and rhythm.  No murmurs, rubs or gallops.

LUNGS:  Clear in all fields bilaterally.  

ABDOMEN:  Soft, nontender.  Bowel sounds x 4.  

EXTREMITIES:  Distal pulses, motor and sensation intact.  Cap refill is brisk.



DIAGNOSES AND PLAN:  

1.  Coronary artery disease status post cardiac arrest.  Continue medicines.  

2.  Neurocognitive disorder.  Continue medicines.

3.  Deep venous thrombosis prophylaxis.  Sequential compression devices and anti
-embolism hose, ambulation.

4.  Smoking.  Nicoderm patch.

5.  Intermittent back pain.  Ibuprofen p.r.n. 

6.  Prediabetes.  Dietary control.  



Continue to monitor closely until guardianship and dc



__________________________________________

Fan SILVER







cc:   



DD: 08/27/2016 07:56:23  1505

TT: 08/27/2016 09:05:45

Confirmation # [id number]

Dictation # [id number]

ln

MTDD

## 2016-08-27 NOTE — CP.PCM.PN
Subjective





- Date & Time of Evaluation


Date of Evaluation: 08/27/16


Time of Evaluation: 07:37





- Subjective


Subjective: 


dictated note 200966


no distres


no f/c, n/v/d


pending guardianship





Objective





- Vital Signs/Intake and Output


Vital Signs (last 24 hours): 


 











Temp Pulse Resp BP Pulse Ox


 


 98.2 F   87   20   113/74   100 


 


 08/26/16 15:57  08/26/16 21:04  08/26/16 15:57  08/26/16 21:04  08/26/16 15:57











- Medications


Medications: 


 Current Medications





Aspirin (Ecotrin)  81 mg PO DAILY [facility]


   Last Admin: 08/26/16 09:23 Dose:  81 mg


Atorvastatin Calcium (Lipitor)  20 mg PO DAILY [facility]


   Last Admin: 08/26/16 09:23 Dose:  20 mg


Divalproex Sodium (Depakote Dr(*Bid*))  500 mg PO BID [facility]


   Last Admin: 08/26/16 17:49 Dose:  500 mg


Enalapril Maleate (Vasotec)  10 mg PO DAILY [facility]


   Last Admin: 08/26/16 09:25 Dose:  Not Given


Famotidine (Pepcid)  20 mg PO BID [facility]


   Last Admin: 08/26/16 17:49 Dose:  20 mg


Ibuprofen (Motrin Tab)  600 mg PO Q8 PRN


   PRN Reason: Pain, moderate (4-7)


   Last Admin: 07/24/16 21:23 Dose:  600 mg


Metoprolol Tartrate (Lopressor)  50 mg PO Q12 [facility]


   Last Admin: 08/26/16 21:04 Dose:  50 mg


Nicotine (Nicoderm Cq)  1 patch TD DAILY [facility]


   Last Admin: 08/26/16 09:25 Dose:  1 patch


Nitroglycerin (Nitrostat Sl Tab)  0.4 mg SL Q5M PRN


   PRN Reason: cp


   Last Admin: 08/02/16 07:37 Dose:  0.4 mg


Quetiapine Fumarate (Seroquel)  25 mg PO HS [facility]


   Last Admin: 08/26/16 21:04 Dose:  25 mg











- Labs


Labs: 


 





 08/02/16 08:10 





 08/02/16 08:10 





 











PT  11.2 SECONDS (9.4-12.0)   12/14/15  05:20    


 


INR  1.05  (0.89-1.20)   12/14/15  05:20    


 


APTT  36.2 SECONDS (23.1-32.0)  H  12/14/15  05:20    














Assessment and Plan


(1) DVT prophylaxis


Status: Chronic





(2) CAD (coronary artery disease)


Status: Chronic





(3) Smoking


Status: Chronic





(4) Low back pain


Status: Chronic





(5) Scrotal edema


Status: Chronic





(6) Prediabetes


Status: Chronic





(7) Neurocognitive disorder


Status: Chronic

## 2016-08-28 RX ADMIN — DIVALPROEX SODIUM SCH MG: 250 TABLET, DELAYED RELEASE ORAL at 16:05

## 2016-08-28 RX ADMIN — DIVALPROEX SODIUM SCH MG: 250 TABLET, DELAYED RELEASE ORAL at 08:41

## 2016-08-28 NOTE — CP.PCM.PN
Subjective





- Date & Time of Evaluation


Date of Evaluation: 08/28/16


Time of Evaluation: 12:50





- Subjective


Subjective: 


comfortable in bed.


no distres/no f/c, n/v/d


pending guardianship


ros ngative





Objective





- Vital Signs/Intake and Output


Vital Signs (last 24 hours): 


 











Temp Pulse Resp BP Pulse Ox


 


 97 F L  79   20   115/69   98 


 


 08/28/16 09:21  08/28/16 09:21  08/28/16 09:21  08/28/16 09:21  08/28/16 09:21











- Medications


Medications: 


 Current Medications





Aspirin (Ecotrin)  81 mg PO DAILY [facility]


   Last Admin: 08/28/16 08:42 Dose:  81 mg


Atorvastatin Calcium (Lipitor)  20 mg PO DAILY [facility]


   Last Admin: 08/28/16 08:42 Dose:  20 mg


Divalproex Sodium (Depakote Dr(*Bid*))  500 mg PO BID [facility]


   Last Admin: 08/28/16 08:41 Dose:  500 mg


Enalapril Maleate (Vasotec)  10 mg PO DAILY [facility]


   Last Admin: 08/28/16 08:44 Dose:  10 mg


Famotidine (Pepcid)  20 mg PO BID [facility]


   Last Admin: 08/28/16 08:44 Dose:  20 mg


Ibuprofen (Motrin Tab)  600 mg PO Q8 PRN


   PRN Reason: Pain, moderate (4-7)


   Last Admin: 07/24/16 21:23 Dose:  600 mg


Metoprolol Tartrate (Lopressor)  50 mg PO Q12 [facility]


   Last Admin: 08/28/16 08:42 Dose:  50 mg


Nicotine (Nicoderm Cq)  1 patch TD DAILY [facility]


   Last Admin: 08/28/16 08:43 Dose:  1 patch


Nitroglycerin (Nitrostat Sl Tab)  0.4 mg SL Q5M PRN


   PRN Reason: cp


   Last Admin: 08/02/16 07:37 Dose:  0.4 mg


Quetiapine Fumarate (Seroquel)  25 mg PO HS [facility]


   Last Admin: 08/27/16 21:09 Dose:  25 mg











- Labs


Labs: 


 





 08/02/16 08:10 





 08/02/16 08:10 





 











PT  11.2 SECONDS (9.4-12.0)   12/14/15  05:20    


 


INR  1.05  (0.89-1.20)   12/14/15  05:20    


 


APTT  36.2 SECONDS (23.1-32.0)  H  12/14/15  05:20    














- Constitutional


Appears: Well, Non-toxic, No Acute Distress





- Head Exam


Head Exam: ATRAUMATIC, NORMAL INSPECTION, NORMOCEPHALIC





- Eye Exam


Eye Exam: EOMI, Normal appearance, PERRL


Pupil Exam: NORMAL ACCOMODATION, PERRL





- ENT Exam


ENT Exam: Mucous Membranes Moist, Normal Exam





- Neck Exam


Neck Exam: Full ROM, Normal Inspection.  absent: Lymphadenopathy





- Respiratory Exam


Respiratory Exam: Clear to Ausculation Bilateral, NORMAL BREATHING PATTERN





- Cardiovascular Exam


Cardiovascular Exam: REGULAR RHYTHM, RRR, +S1, +S2.  absent: Murmur





- GI/Abdominal Exam


GI & Abdominal Exam: Soft, Normal Bowel Sounds.  absent: Tenderness





- Extremities Exam


Extremities Exam: Full ROM, Normal Capillary Refill, Normal Inspection.  absent

: Joint Swelling, Pedal Edema





- Back Exam


Back Exam: NORMAL INSPECTION





- Neurological Exam


Neurological Exam: Alert, Awake, CN II-XII Intact, Normal Gait, Oriented x3





- Psychiatric Exam


Psychiatric exam: Normal Affect, Normal Mood





- Skin


Skin Exam: Dry, Intact, Normal Color, Warm





Assessment and Plan


(1) DVT prophylaxis


Assessment & Plan: 


scd and ae hose


ambulation


Status: Chronic





(2) CAD (coronary artery disease)


Assessment & Plan: 


cont meds


Status: Chronic





(3) Smoking


Assessment & Plan: 


nicoderm


Status: Chronic





(4) Low back pain


Assessment & Plan: 


ibuprofen prn


Status: Chronic





(5) Scrotal edema


Status: Chronic





(6) Prediabetes


Assessment & Plan: 


diet


Status: Chronic





(7) Neurocognitive disorder


Assessment & Plan: 


cont meds


Status: Chronic

## 2016-08-29 RX ADMIN — DIVALPROEX SODIUM SCH MG: 250 TABLET, DELAYED RELEASE ORAL at 09:07

## 2016-08-29 RX ADMIN — DIVALPROEX SODIUM SCH MG: 250 TABLET, DELAYED RELEASE ORAL at 16:55

## 2016-08-29 NOTE — CP.PCM.PN
Subjective





- Date & Time of Evaluation


Date of Evaluation: 08/29/16


Time of Evaluation: 07:19





- Subjective


Subjective: 


calm adn cooperative


no distress/complaints


no f/c, n/v/d


ros negative


pending guardianship





Objective





- Vital Signs/Intake and Output


Vital Signs (last 24 hours): 


 











Temp Pulse Resp BP Pulse Ox


 


 97.5 F L  66   17   110/68   100 


 


 08/29/16 01:00  08/28/16 22:12  08/29/16 01:00  08/28/16 22:12  08/29/16 01:00











- Medications


Medications: 


 Current Medications





Aspirin (Ecotrin)  81 mg PO DAILY [facility]


   Last Admin: 08/28/16 08:42 Dose:  81 mg


Atorvastatin Calcium (Lipitor)  20 mg PO DAILY [facility]


   Last Admin: 08/28/16 08:42 Dose:  20 mg


Divalproex Sodium (Depakote Dr(*Bid*))  500 mg PO BID [facility]


   Last Admin: 08/28/16 16:05 Dose:  500 mg


Enalapril Maleate (Vasotec)  10 mg PO DAILY [facility]


   Last Admin: 08/28/16 08:44 Dose:  10 mg


Famotidine (Pepcid)  20 mg PO BID [facility]


   Last Admin: 08/28/16 16:05 Dose:  20 mg


Ibuprofen (Motrin Tab)  600 mg PO Q8 PRN


   PRN Reason: Pain, moderate (4-7)


   Last Admin: 07/24/16 21:23 Dose:  600 mg


Metoprolol Tartrate (Lopressor)  50 mg PO Q12 [facility]


   Last Admin: 08/28/16 22:12 Dose:  50 mg


Nicotine (Nicoderm Cq)  1 patch TD DAILY [facility]


   Last Admin: 08/28/16 08:43 Dose:  1 patch


Nitroglycerin (Nitrostat Sl Tab)  0.4 mg SL Q5M PRN


   PRN Reason: cp


   Last Admin: 08/02/16 07:37 Dose:  0.4 mg


Quetiapine Fumarate (Seroquel)  25 mg PO HS [facility]


   Last Admin: 08/28/16 22:11 Dose:  25 mg











- Labs


Labs: 


 





 08/02/16 08:10 





 08/02/16 08:10 





 











PT  11.2 SECONDS (9.4-12.0)   12/14/15  05:20    


 


INR  1.05  (0.89-1.20)   12/14/15  05:20    


 


APTT  36.2 SECONDS (23.1-32.0)  H  12/14/15  05:20    














- Constitutional


Appears: Well, Non-toxic, No Acute Distress





- Head Exam


Head Exam: ATRAUMATIC, NORMAL INSPECTION, NORMOCEPHALIC





- Eye Exam


Eye Exam: EOMI, Normal appearance, PERRL


Pupil Exam: NORMAL ACCOMODATION, PERRL





- ENT Exam


ENT Exam: Mucous Membranes Moist, Normal Exam





- Neck Exam


Neck Exam: Full ROM, Normal Inspection.  absent: Lymphadenopathy





- Respiratory Exam


Respiratory Exam: Clear to Ausculation Bilateral, NORMAL BREATHING PATTERN





- Cardiovascular Exam


Cardiovascular Exam: REGULAR RHYTHM, RRR, +S1, +S2.  absent: Murmur





- GI/Abdominal Exam


GI & Abdominal Exam: Soft, Normal Bowel Sounds.  absent: Tenderness





- Extremities Exam


Extremities Exam: Full ROM, Normal Capillary Refill, Normal Inspection.  absent

: Joint Swelling, Pedal Edema





- Back Exam


Back Exam: Full ROM, NORMAL INSPECTION





- Neurological Exam


Neurological Exam: Alert, Awake, CN II-XII Intact, Normal Gait, Oriented x3





- Psychiatric Exam


Psychiatric exam: Normal Affect, Normal Mood





- Skin


Skin Exam: Dry, Intact, Normal Color, Warm





Assessment and Plan


(1) DVT prophylaxis


Assessment & Plan: 


scd and aehose


ambulation


Status: Chronic





(2) CAD (coronary artery disease)


Assessment & Plan: 


cont meds


Status: Chronic





(3) Smoking


Assessment & Plan: 


nicoderm


Status: Chronic





(4) Low back pain


Assessment & Plan: 


ibuprofne prn


Status: Chronic





(5) Scrotal edema


Status: Chronic





(6) Prediabetes


Assessment & Plan: 


dietary


Status: Chronic





(7) Neurocognitive disorder


Assessment & Plan: 


cont meds


Status: Chronic

## 2016-08-30 RX ADMIN — DIVALPROEX SODIUM SCH MG: 250 TABLET, DELAYED RELEASE ORAL at 17:21

## 2016-08-30 RX ADMIN — DIVALPROEX SODIUM SCH MG: 250 TABLET, DELAYED RELEASE ORAL at 08:44

## 2016-08-30 NOTE — CP.PCM.PN
Subjective





- Date & Time of Evaluation


Date of Evaluation: 08/30/16


Time of Evaluation: 06:57





- Subjective


Subjective: 


calm and cooperative


no f/c, n/v/d


no distress


pending guarianship


ros negative





Objective





- Vital Signs/Intake and Output


Vital Signs (last 24 hours): 


 











Temp Pulse Resp BP Pulse Ox


 


 97.2 F L  70   20   119/69   99 


 


 08/30/16 00:10  08/30/16 00:10  08/30/16 00:10  08/30/16 00:10  08/30/16 00:10











- Medications


Medications: 


 Current Medications





Aspirin (Ecotrin)  81 mg PO DAILY [facility]


   Last Admin: 08/29/16 09:07 Dose:  81 mg


Atorvastatin Calcium (Lipitor)  20 mg PO DAILY [facility]


   Last Admin: 08/29/16 09:07 Dose:  20 mg


Divalproex Sodium (Depakote Dr(*Bid*))  500 mg PO BID [facility]


   Last Admin: 08/29/16 16:55 Dose:  500 mg


Enalapril Maleate (Vasotec)  10 mg PO DAILY [facility]


   Last Admin: 08/29/16 09:07 Dose:  10 mg


Famotidine (Pepcid)  20 mg PO BID [facility]


   Last Admin: 08/29/16 16:55 Dose:  20 mg


Ibuprofen (Motrin Tab)  600 mg PO Q8 PRN


   PRN Reason: Pain, moderate (4-7)


   Last Admin: 07/24/16 21:23 Dose:  600 mg


Metoprolol Tartrate (Lopressor)  50 mg PO Q12 [facility]


   Last Admin: 08/29/16 21:46 Dose:  50 mg


Nicotine (Nicoderm Cq)  1 patch TD DAILY [facility]


   Last Admin: 08/29/16 09:09 Dose:  Not Given


Nitroglycerin (Nitrostat Sl Tab)  0.4 mg SL Q5M PRN


   PRN Reason: cp


   Last Admin: 08/02/16 07:37 Dose:  0.4 mg


Quetiapine Fumarate (Seroquel)  25 mg PO HS [facility]


   Last Admin: 08/29/16 21:46 Dose:  25 mg











- Labs


Labs: 


 





 08/02/16 08:10 





 08/02/16 08:10 





 











PT  11.2 SECONDS (9.4-12.0)   12/14/15  05:20    


 


INR  1.05  (0.89-1.20)   12/14/15  05:20    


 


APTT  36.2 SECONDS (23.1-32.0)  H  12/14/15  05:20    














- Constitutional


Appears: Well, Non-toxic, No Acute Distress





- Head Exam


Head Exam: ATRAUMATIC, NORMAL INSPECTION, NORMOCEPHALIC





- Eye Exam


Eye Exam: EOMI, Normal appearance, PERRL


Pupil Exam: NORMAL ACCOMODATION, PERRL





- ENT Exam


ENT Exam: Mucous Membranes Moist, Normal Exam





- Neck Exam


Neck Exam: Full ROM, Normal Inspection.  absent: Lymphadenopathy





- Respiratory Exam


Respiratory Exam: Clear to Ausculation Bilateral, NORMAL BREATHING PATTERN





- Cardiovascular Exam


Cardiovascular Exam: REGULAR RHYTHM, +S1, +S2.  absent: Murmur





- GI/Abdominal Exam


GI & Abdominal Exam: Soft, Normal Bowel Sounds.  absent: Tenderness





- Extremities Exam


Extremities Exam: Full ROM, Normal Capillary Refill, Normal Inspection.  absent

: Joint Swelling, Pedal Edema





- Back Exam


Back Exam: NORMAL INSPECTION





- Neurological Exam


Neurological Exam: Alert, Awake, CN II-XII Intact, Normal Gait, Oriented x3





- Psychiatric Exam


Psychiatric exam: Normal Affect, Normal Mood





- Skin


Skin Exam: Dry, Intact, Normal Color, Warm





Assessment and Plan


(1) DVT prophylaxis


Status: Chronic





(2) CAD (coronary artery disease)


Status: Chronic





(3) Smoking


Status: Chronic





(4) Low back pain


Status: Chronic





(5) Scrotal edema


Status: Chronic





(6) Prediabetes


Status: Chronic





(7) Neurocognitive disorder


Status: Chronic








- Assessment and Plan (Free Text)


Assessment: 


(1) DVT prophylaxis


Assessment & Plan: 


scd and aehose


ambulation


Status: Chronic





(2) CAD (coronary artery disease)


Assessment & Plan: 


cont meds


Status: Chronic





(3) Smoking


Assessment & Plan: 


nicoderm


Status: Chronic





(4) Low back pain


Assessment & Plan: 


ibuprofne prn


Status: Chronic





(5) Scrotal edema


Status: Chronic





(6) Prediabetes


Assessment & Plan: 


dietary


Status: Chronic





(7) Neurocognitive disorder


Assessment & Plan: 


cont meds


Status: Chronic

## 2016-08-31 RX ADMIN — DIVALPROEX SODIUM SCH MG: 250 TABLET, DELAYED RELEASE ORAL at 09:42

## 2016-08-31 RX ADMIN — DIVALPROEX SODIUM SCH MG: 250 TABLET, DELAYED RELEASE ORAL at 17:39

## 2016-08-31 NOTE — CP.PCM.PN
Subjective





- Date & Time of Evaluation


Date of Evaluation: 08/31/16


Time of Evaluation: 07:15





- Subjective


Subjective: 


no f/c, n/v/d


penidng guardianship


no distresss/complaints


rosnegative





Objective





- Vital Signs/Intake and Output


Vital Signs (last 24 hours): 


 











Temp Pulse Resp BP Pulse Ox


 


 97.9 F   78   18   102/71   100 


 


 08/31/16 00:00  08/31/16 00:00  08/31/16 00:00  08/31/16 00:00  08/31/16 00:00











- Medications


Medications: 


 Current Medications





Aspirin (Ecotrin)  [facility]ILY [facility]


   Last Admin: 08/30/16 08:43 Dose:  81 mg


Atorvastatin Calcium (Lipitor)  20 mg PO DAILY [facility]


   Last Admin: 08/30/16 08:43 Dose:  20 mg


Divalproex Sodium (Depakote Dr(*Bid*))  500 mg PO BID [facility]


   Last Admin: 08/30/16 17:21 Dose:  500 mg


Enalapril Maleate (Vasotec)  10 mg PO DAILY [facility]


   Last Admin: 08/30/16 08:43 Dose:  10 mg


Famotidine (Pepcid)  20 mg PO BID [facility]


   Last Admin: 08/30/16 17:21 Dose:  20 mg


Ibuprofen (Motrin Tab)  600 mg PO Q8 PRN


   PRN Reason: Pain, moderate (4-7)


   Last Admin: 07/24/16 21:23 Dose:  600 mg


Metoprolol Tartrate (Lopressor)  50 mg PO Q12 [facility]


   Last Admin: 08/30/16 20:52 Dose:  50 mg


Nicotine (Nicoderm Cq)  1 patch TD DAILY [facility]


   Last Admin: 08/30/16 08:43 Dose:  1 patch


Nitroglycerin (Nitrostat Sl Tab)  0.4 mg SL Q5M PRN


   PRN Reason: cp


   Last Admin: 08/02/16 07:37 Dose:  0.4 mg


Quetiapine Fumarate (Seroquel)  25 mg PO HS [facility]


   Last Admin: 08/30/16 21:42 Dose:  25 mg











- Labs


Labs: 


 





 08/02/16 08:10 





 08/02/16 08:10 





 











PT  11.2 SECONDS (9.4-12.0)   12/14/15  05:20    


 


INR  1.05  (0.89-1.20)   12/14/15  05:20    


 


APTT  36.2 SECONDS (23.1-32.0)  H  12/14/15  05:20    














- Constitutional


Appears: Well, Non-toxic, No Acute Distress





- Head Exam


Head Exam: ATRAUMATIC, NORMAL INSPECTION, NORMOCEPHALIC





- Eye Exam


Eye Exam: EOMI, Normal appearance, PERRL


Pupil Exam: NORMAL ACCOMODATION, PERRL





- ENT Exam


ENT Exam: Mucous Membranes Moist, Normal Exam





- Neck Exam


Neck Exam: Full ROM, Normal Inspection.  absent: Lymphadenopathy





- Respiratory Exam


Respiratory Exam: Clear to Ausculation Bilateral, NORMAL BREATHING PATTERN





- Cardiovascular Exam


Cardiovascular Exam: REGULAR RHYTHM, RRR, +S1, +S2.  absent: Murmur





- GI/Abdominal Exam


GI & Abdominal Exam: Soft, Normal Bowel Sounds.  absent: Tenderness





- Extremities Exam


Extremities Exam: Full ROM, Normal Capillary Refill, Normal Inspection.  absent

: Joint Swelling, Pedal Edema





- Back Exam


Back Exam: NORMAL INSPECTION





- Neurological Exam


Neurological Exam: Alert, Awake, CN II-XII Intact, Normal Gait, Oriented x3





- Psychiatric Exam


Psychiatric exam: Normal Affect, Normal Mood





- Skin


Skin Exam: Dry, Intact, Normal Color, Warm





Assessment and Plan


(1) DVT prophylaxis


Assessment & Plan: 


scd and ae hsoe


ambulation


Status: Chronic





(2) CAD (coronary artery disease)


Assessment & Plan: 


cont meds


Status: Chronic





(3) Smoking


Assessment & Plan: 


nicoderm


Status: Chronic





(4) Low back pain


Assessment & Plan: 


ibuprofne prn


Status: Chronic





(5) Scrotal edema


Status: Chronic





(6) Prediabetes


Assessment & Plan: 


dietary


Status: Chronic





(7) Neurocognitive disorder


Assessment & Plan: 


cont meds


Status: Chronic

## 2016-09-01 RX ADMIN — DIVALPROEX SODIUM SCH MG: 250 TABLET, DELAYED RELEASE ORAL at 09:04

## 2016-09-01 RX ADMIN — DIVALPROEX SODIUM SCH MG: 250 TABLET, DELAYED RELEASE ORAL at 17:39

## 2016-09-01 NOTE — CP.PCM.PN
Subjective





- Date & Time of Evaluation


Date of Evaluation: 09/01/16


Time of Evaluation: 07:03





- Subjective


Subjective: 


calma nd cooperative


no distress, f/c, n/v/d


pending guardianship


ros negative





Objective





- Vital Signs/Intake and Output


Vital Signs (last 24 hours): 


 











Temp Pulse Resp BP Pulse Ox


 


 96.2 F L  86   20   104/64   99 


 


 09/01/16 00:53  09/01/16 00:53  09/01/16 00:53  09/01/16 00:53  09/01/16 00:53











- Medications


Medications: 


 Current Medications





Aspirin (Ecotrin)  81 mg PO DAILY [facility]


   Last Admin: 08/31/16 09:42 Dose:  81 mg


Atorvastatin Calcium (Lipitor)  20 mg PO DAILY [facility]


   Last Admin: 08/31/16 09:43 Dose:  20 mg


Divalproex Sodium (Depakote Dr(*Bid*))  500 mg PO BID [facility]


   Last Admin: 08/31/16 17:39 Dose:  500 mg


Enalapril Maleate (Vasotec)  10 mg PO DAILY [facility]


   Last Admin: 08/31/16 09:44 Dose:  10 mg


Famotidine (Pepcid)  20 mg PO BID [facility]


   Last Admin: 08/31/16 17:40 Dose:  20 mg


Ibuprofen (Motrin Tab)  600 mg PO Q8 PRN


   PRN Reason: Pain, moderate (4-7)


   Last Admin: 07/24/16 21:23 Dose:  600 mg


Metoprolol Tartrate (Lopressor)  50 mg PO Q12 [facility]


   Last Admin: 08/31/16 21:24 Dose:  50 mg


Nicotine (Nicoderm Cq)  1 patch TD DAILY [facility]


   Last Admin: 08/31/16 09:44 Dose:  1 patch


Nitroglycerin (Nitrostat Sl Tab)  0.4 mg SL Q5M PRN


   PRN Reason: cp


   Last Admin: 08/02/16 07:37 Dose:  0.4 mg


Quetiapine Fumarate (Seroquel)  25 mg PO HS [facility]


   Last Admin: 08/31/16 21:24 Dose:  25 mg











- Labs


Labs: 


 





 08/02/16 08:10 





 08/02/16 08:10 





 











PT  11.2 SECONDS (9.4-12.0)   12/14/15  05:20    


 


INR  1.05  (0.89-1.20)   12/14/15  05:20    


 


APTT  36.2 SECONDS (23.1-32.0)  H  12/14/15  05:20    














- Constitutional


Appears: Well, Non-toxic, No Acute Distress





- Head Exam


Head Exam: ATRAUMATIC, NORMAL INSPECTION, NORMOCEPHALIC





- Eye Exam


Eye Exam: EOMI, Normal appearance, PERRL


Pupil Exam: NORMAL ACCOMODATION, PERRL





- ENT Exam


ENT Exam: Mucous Membranes Moist, Normal Exam





- Neck Exam


Neck Exam: Full ROM, Normal Inspection.  absent: Lymphadenopathy





- Respiratory Exam


Respiratory Exam: Clear to Ausculation Bilateral, NORMAL BREATHING PATTERN





- Cardiovascular Exam


Cardiovascular Exam: REGULAR RHYTHM, RRR, +S1, +S2.  absent: Murmur





- GI/Abdominal Exam


GI & Abdominal Exam: Soft, Normal Bowel Sounds.  absent: Tenderness





- Extremities Exam


Extremities Exam: Full ROM, Normal Capillary Refill, Normal Inspection.  absent

: Joint Swelling, Pedal Edema





- Back Exam


Back Exam: NORMAL INSPECTION





- Neurological Exam


Neurological Exam: Alert, Awake, CN II-XII Intact, Normal Gait, Oriented x3





- Psychiatric Exam


Psychiatric exam: Normal Affect, Normal Mood





- Skin


Skin Exam: Dry, Intact, Normal Color, Warm





Assessment and Plan


(1) DVT prophylaxis


Status: Chronic





(2) CAD (coronary artery disease)


Status: Chronic





(3) Smoking


Status: Chronic





(4) Low back pain


Status: Chronic





(5) Scrotal edema


Status: Chronic





(6) Prediabetes


Status: Chronic





(7) Neurocognitive disorder


Status: Chronic








- Assessment and Plan (Free Text)


Assessment: 


(1) DVT prophylaxis


Assessment & Plan: 


scd and ae hsoe


ambulation


Status: Chronic





(2) CAD (coronary artery disease)


Assessment & Plan: 


cont meds


Status: Chronic





(3) Smoking


Assessment & Plan: 


nicoderm


Status: Chronic





(4) Low back pain


Assessment & Plan: 


ibuprofne prn


Status: Chronic





(5) Scrotal edema


Status: Chronic





(6) Prediabetes


Assessment & Plan: 


dietary


Status: Chronic





(7) Neurocognitive disorder


Assessment & Plan: 


cont meds


Status: Chronic

## 2016-09-02 RX ADMIN — DIVALPROEX SODIUM SCH MG: 250 TABLET, DELAYED RELEASE ORAL at 16:26

## 2016-09-02 RX ADMIN — DIVALPROEX SODIUM SCH MG: 250 TABLET, DELAYED RELEASE ORAL at 09:23

## 2016-09-02 NOTE — CP.PCM.PN
Subjective





- Date & Time of Evaluation


Date of Evaluation: 09/02/16


Time of Evaluation: 10:08





- Subjective


Subjective: 


has guardianship


pending placement 


no f/c, n/v/d, calma dn cooperative





Objective





- Vital Signs/Intake and Output


Vital Signs (last 24 hours): 


 











Temp Pulse Resp BP Pulse Ox


 


 97 F L  81   20   120/80   100 


 


 09/02/16 08:04  09/02/16 09:24  09/02/16 08:04  09/02/16 09:24  09/02/16 08:04











- Medications


Medications: 


 Current Medications





Aspirin (Ecotrin)  81 mg PO DAILY [facility]


   Last Admin: 09/02/16 09:23 Dose:  81 mg


Atorvastatin Calcium (Lipitor)  20 mg PO DAILY [facility]


   Last Admin: 09/02/16 09:24 Dose:  20 mg


Divalproex Sodium (Depakote Dr(*Bid*))  500 mg PO BID [facility]


   Last Admin: 09/02/16 09:23 Dose:  500 mg


Enalapril Maleate (Vasotec)  10 mg PO DAILY [facility]


   Last Admin: 09/02/16 09:26 Dose:  10 mg


Famotidine (Pepcid)  20 mg PO BID [facility]


   Last Admin: 09/02/16 09:26 Dose:  20 mg


Ibuprofen (Motrin Tab)  600 mg PO Q8 PRN


   PRN Reason: Pain, moderate (4-7)


   Last Admin: 07/24/16 21:23 Dose:  600 mg


Metoprolol Tartrate (Lopressor)  50 mg PO Q12 [facility]


   Last Admin: 09/02/16 09:24 Dose:  50 mg


Nicotine (Nicoderm Cq)  1 patch TD DAILY [facility]


   Last Admin: 09/02/16 09:26 Dose:  1 patch


Nitroglycerin (Nitrostat Sl Tab)  0.4 mg SL Q5M PRN


   PRN Reason: cp


   Last Admin: 08/02/16 07:37 Dose:  0.4 mg


Quetiapine Fumarate (Seroquel)  25 mg PO HS [facility]


   Last Admin: 09/01/16 21:09 Dose:  25 mg











- Labs


Labs: 


 





 08/02/16 08:10 





 08/02/16 08:10 





 











PT  11.2 SECONDS (9.4-12.0)   12/14/15  05:20    


 


INR  1.05  (0.89-1.20)   12/14/15  05:20    


 


APTT  36.2 SECONDS (23.1-32.0)  H  12/14/15  05:20    














- Constitutional


Appears: Well, Non-toxic, No Acute Distress





- Head Exam


Head Exam: ATRAUMATIC, NORMAL INSPECTION, NORMOCEPHALIC





- Eye Exam


Eye Exam: EOMI, Normal appearance, PERRL


Pupil Exam: NORMAL ACCOMODATION, PERRL





- ENT Exam


ENT Exam: Mucous Membranes Moist, Normal Exam





- Neck Exam


Neck Exam: Full ROM, Normal Inspection.  absent: Lymphadenopathy





- Respiratory Exam


Respiratory Exam: Clear to Ausculation Bilateral, NORMAL BREATHING PATTERN





- Cardiovascular Exam


Cardiovascular Exam: REGULAR RHYTHM, RRR, +S1, +S2.  absent: Murmur





- GI/Abdominal Exam


GI & Abdominal Exam: Soft, Normal Bowel Sounds.  absent: Tenderness





- Extremities Exam


Extremities Exam: Full ROM, Normal Capillary Refill, Normal Inspection.  absent

: Joint Swelling, Pedal Edema





- Back Exam


Back Exam: NORMAL INSPECTION





- Neurological Exam


Neurological Exam: Alert, Awake, CN II-XII Intact, Normal Gait, Oriented x3





- Psychiatric Exam


Psychiatric exam: Normal Affect, Normal Mood





- Skin


Skin Exam: Dry, Intact, Normal Color, Warm





Assessment and Plan


(1) DVT prophylaxis


Status: Chronic





(2) CAD (coronary artery disease)


Status: Chronic





(3) Smoking


Status: Chronic





(4) Low back pain


Status: Chronic





(5) Scrotal edema


Status: Chronic





(6) Prediabetes


Status: Chronic





(7) Neurocognitive disorder


Status: Chronic








- Assessment and Plan (Free Text)


Assessment: 


(1) DVT prophylaxis


Assessment & Plan: 


scd and ae hsoe


ambulation


Status: Chronic





(2) CAD (coronary artery disease)


Assessment & Plan: 


cont meds


Status: Chronic





(3) Smoking


Assessment & Plan: 


nicoderm


Status: Chronic





(4) Low back pain


Assessment & Plan: 


ibuprofne prn


Status: Chronic





(5) Scrotal edema


Status: Chronic





(6) Prediabetes


Assessment & Plan: 


dietary


Status: Chronic





(7) Neurocognitive disorder


Assessment & Plan: 


cont meds


Status: Chronic

## 2016-09-03 RX ADMIN — DIVALPROEX SODIUM SCH MG: 250 TABLET, DELAYED RELEASE ORAL at 17:15

## 2016-09-03 RX ADMIN — DIVALPROEX SODIUM SCH MG: 250 TABLET, DELAYED RELEASE ORAL at 09:39

## 2016-09-03 NOTE — CP.PCM.PN
Subjective





- Date & Time of Evaluation


Date of Evaluation: 09/03/16


Time of Evaluation: 14:50





- Subjective


Subjective: 


calm and coopeartive


no distress


pending plazcement


ros engative





Objective





- Vital Signs/Intake and Output


Vital Signs (last 24 hours): 


 











Temp Pulse Resp BP Pulse Ox


 


 97.3 F L  65   20   103/65   100 


 


 09/03/16 08:15  09/03/16 09:40  09/03/16 08:15  09/03/16 09:40  09/03/16 08:15











- Medications


Medications: 


 Current Medications





Aspirin (Ecotrin)  81 mg PO DAILY [facility]


   Last Admin: 09/03/16 09:39 Dose:  81 mg


Atorvastatin Calcium (Lipitor)  20 mg PO DAILY [facility]


   Last Admin: 09/03/16 09:39 Dose:  20 mg


Divalproex Sodium (Depakote Dr(*Bid*))  500 mg PO BID [facility]


   Last Admin: 09/03/16 09:39 Dose:  500 mg


Enalapril Maleate (Vasotec)  10 mg PO DAILY [facility]


   Last Admin: 09/03/16 09:39 Dose:  10 mg


Famotidine (Pepcid)  20 mg PO BID [facility]


   Last Admin: 09/03/16 09:39 Dose:  20 mg


Ibuprofen (Motrin Tab)  600 mg PO Q8 PRN


   PRN Reason: Pain, moderate (4-7)


   Last Admin: 07/24/16 21:23 Dose:  600 mg


Metoprolol Tartrate (Lopressor)  50 mg PO Q12 [facility]


   Last Admin: 09/03/16 09:40 Dose:  50 mg


Nicotine (Nicoderm Cq)  1 patch TD DAILY [facility]


   Last Admin: 09/03/16 09:41 Dose:  1 patch


Nitroglycerin (Nitrostat Sl Tab)  0.4 mg SL Q5M PRN


   PRN Reason: cp


   Last Admin: 08/02/16 07:37 Dose:  0.4 mg


Quetiapine Fumarate (Seroquel)  25 mg PO HS [facility]


   Last Admin: 09/02/16 21:17 Dose:  25 mg











- Labs


Labs: 


 





 08/02/16 08:10 





 08/02/16 08:10 





 











PT  11.2 SECONDS (9.4-12.0)   12/14/15  05:20    


 


INR  1.05  (0.89-1.20)   12/14/15  05:20    


 


APTT  36.2 SECONDS (23.1-32.0)  H  12/14/15  05:20    














- Constitutional


Appears: Well, Non-toxic, No Acute Distress





- Head Exam


Head Exam: ATRAUMATIC, NORMAL INSPECTION, NORMOCEPHALIC





- Eye Exam


Eye Exam: EOMI, Normal appearance, PERRL


Pupil Exam: NORMAL ACCOMODATION, PERRL





- ENT Exam


ENT Exam: Mucous Membranes Moist, Normal Exam





- Neck Exam


Neck Exam: Full ROM, Normal Inspection.  absent: Lymphadenopathy





- Respiratory Exam


Respiratory Exam: Clear to Ausculation Bilateral, NORMAL BREATHING PATTERN





- Cardiovascular Exam


Cardiovascular Exam: REGULAR RHYTHM, +S1, +S2.  absent: Murmur





- GI/Abdominal Exam


GI & Abdominal Exam: Soft, Normal Bowel Sounds.  absent: Tenderness





- Extremities Exam


Extremities Exam: Full ROM, Normal Capillary Refill, Normal Inspection.  absent

: Joint Swelling, Pedal Edema





- Back Exam


Back Exam: NORMAL INSPECTION





- Neurological Exam


Neurological Exam: Alert, Awake, CN II-XII Intact, Normal Gait, Oriented x3





- Psychiatric Exam


Psychiatric exam: Normal Affect, Normal Mood





- Skin


Skin Exam: Dry, Intact, Normal Color, Warm





Assessment and Plan


(1) DVT prophylaxis


Status: Chronic





(2) CAD (coronary artery disease)


Status: Chronic





(3) Smoking


Status: Chronic





(4) Low back pain


Status: Chronic





(5) Scrotal edema


Status: Chronic





(6) Prediabetes


Status: Chronic





(7) Neurocognitive disorder


Status: Chronic








- Assessment and Plan (Free Text)


Assessment: 


(1) DVT prophylaxis


Assessment & Plan: 


scd and ae hsoe


ambulation


Status: Chronic





(2) CAD (coronary artery disease)


Assessment & Plan: 


cont meds


Status: Chronic





(3) Smoking


Assessment & Plan: 


nicoderm


Status: Chronic





(4) Low back pain


Assessment & Plan: 


ibuprofne prn


Status: Chronic





(5) Scrotal edema


Status: Chronic





(6) Prediabetes


Assessment & Plan: 


dietary


Status: Chronic





(7) Neurocognitive disorder


Assessment & Plan: 


cont meds


Status: Chronic

## 2016-09-04 PROCEDURE — 3E0234Z INTRODUCTION OF SERUM, TOXOID AND VACCINE INTO MUSCLE, PERCUTANEOUS APPROACH: ICD-10-PCS | Performed by: FAMILY MEDICINE

## 2016-09-04 RX ADMIN — DIVALPROEX SODIUM SCH MG: 250 TABLET, DELAYED RELEASE ORAL at 09:31

## 2016-09-04 RX ADMIN — DIVALPROEX SODIUM SCH MG: 250 TABLET, DELAYED RELEASE ORAL at 16:19

## 2016-09-04 NOTE — CP.PCM.PN
Subjective





- Date & Time of Evaluation


Date of Evaluation: 09/04/16


Time of Evaluation: 11:12





- Subjective


Subjective: 


pending placement no compaints/distres


no f/c, n/v/d


ros negative





Objective





- Vital Signs/Intake and Output


Vital Signs (last 24 hours): 


 











Temp Pulse Resp BP Pulse Ox


 


 98.1 F   62   20   106/63   99 


 


 09/04/16 09:02  09/04/16 09:31  09/04/16 09:02  09/04/16 09:31  09/04/16 09:02











- Medications


Medications: 


 Current Medications





Aspirin (Ecotrin)  81 mg PO DAILY [facility]


   Last Admin: 09/04/16 09:31 Dose:  81 mg


Atorvastatin Calcium (Lipitor)  20 mg PO DAILY [facility]


   Last Admin: 09/04/16 09:31 Dose:  20 mg


Divalproex Sodium (Depakote Dr(*Bid*))  500 mg PO BID [facility]


   Last Admin: 09/04/16 09:31 Dose:  500 mg


Enalapril Maleate (Vasotec)  10 mg PO DAILY [facility]


   Last Admin: 09/03/16 09:39 Dose:  10 mg


Famotidine (Pepcid)  20 mg PO BID [facility]


   Last Admin: 09/04/16 09:32 Dose:  20 mg


Ibuprofen (Motrin Tab)  600 mg PO Q8 PRN


   PRN Reason: Pain, moderate (4-7)


   Last Admin: 07/24/16 21:23 Dose:  600 mg


Metoprolol Tartrate (Lopressor)  50 mg PO Q12 [facility]


   Last Admin: 09/04/16 09:31 Dose:  50 mg


Nicotine (Nicoderm Cq)  1 patch TD DAILY [facility]


   Last Admin: 09/04/16 09:32 Dose:  1 patch


Nitroglycerin (Nitrostat Sl Tab)  0.4 mg SL Q5M PRN


   PRN Reason: cp


   Last Admin: 08/02/16 07:37 Dose:  0.4 mg


Quetiapine Fumarate (Seroquel)  25 mg PO HS [facility]


   Last Admin: 09/03/16 21:26 Dose:  25 mg











- Labs


Labs: 


 





 08/02/16 08:10 





 08/02/16 08:10 





 











PT  11.2 SECONDS (9.4-12.0)   12/14/15  05:20    


 


INR  1.05  (0.89-1.20)   12/14/15  05:20    


 


APTT  36.2 SECONDS (23.1-32.0)  H  12/14/15  05:20    














- Constitutional


Appears: Well, Non-toxic, No Acute Distress





- Head Exam


Head Exam: ATRAUMATIC, NORMAL INSPECTION, NORMOCEPHALIC





- Eye Exam


Eye Exam: EOMI, Normal appearance, PERRL


Pupil Exam: NORMAL ACCOMODATION, PERRL





- ENT Exam


ENT Exam: Mucous Membranes Moist, Normal Exam





- Neck Exam


Neck Exam: Full ROM, Normal Inspection.  absent: Lymphadenopathy





- Respiratory Exam


Respiratory Exam: Clear to Ausculation Bilateral, NORMAL BREATHING PATTERN





- Cardiovascular Exam


Cardiovascular Exam: REGULAR RHYTHM, +S1, +S2.  absent: Murmur





- GI/Abdominal Exam


GI & Abdominal Exam: Soft, Normal Bowel Sounds.  absent: Tenderness





- Extremities Exam


Extremities Exam: Full ROM, Normal Capillary Refill, Normal Inspection.  absent

: Joint Swelling, Pedal Edema





- Back Exam


Back Exam: NORMAL INSPECTION





- Neurological Exam


Neurological Exam: Alert, Awake, CN II-XII Intact, Normal Gait, Oriented x3





- Psychiatric Exam


Psychiatric exam: Normal Affect, Normal Mood





- Skin


Skin Exam: Dry, Intact, Normal Color, Warm





Assessment and Plan


(1) DVT prophylaxis


Status: Chronic





(2) CAD (coronary artery disease)


Status: Chronic





(3) Smoking


Status: Chronic





(4) Low back pain


Status: Chronic





(5) Scrotal edema


Status: Chronic





(6) Prediabetes


Status: Chronic





(7) Neurocognitive disorder


Status: Chronic








- Assessment and Plan (Free Text)


Assessment: 


(1) DVT prophylaxis


Assessment & Plan: 


scd and ae hsoe


ambulation


Status: Chronic





(2) CAD (coronary artery disease)


Assessment & Plan: 


cont meds


Status: Chronic





(3) Smoking


Assessment & Plan: 


nicoderm


Status: Chronic





(4) Low back pain


Assessment & Plan: 


ibuprofne prn


Status: Chronic





(5) Scrotal edema


Status: Chronic





(6) Prediabetes


Assessment & Plan: 


dietary


Status: Chronic





(7) Neurocognitive disorder


Assessment & Plan: 


cont meds


Status: Chronic

## 2016-09-05 RX ADMIN — DIVALPROEX SODIUM SCH MG: 250 TABLET, DELAYED RELEASE ORAL at 16:46

## 2016-09-05 RX ADMIN — DIVALPROEX SODIUM SCH MG: 250 TABLET, DELAYED RELEASE ORAL at 08:20

## 2016-09-05 NOTE — CP.PCM.PN
Subjective





- Date & Time of Evaluation


Date of Evaluation: 09/05/16


Time of Evaluation: 10:21





- Subjective


Subjective: 


pt comfortable, no distress


calm and cooperativeno f/c, n/v/d


pending ploacement


ros negative





Objective





- Vital Signs/Intake and Output


Vital Signs (last 24 hours): 


 











Temp Pulse Resp BP Pulse Ox


 


 97.1 F L  67   20   106/65   99 


 


 09/05/16 08:05  09/05/16 08:21  09/05/16 08:05  09/05/16 08:21  09/05/16 08:05











- Medications


Medications: 


 Current Medications





Aspirin (Ecotrin)  81 mg PO DAILY Replaced by [facility]


   Last Admin: 09/05/16 08:20 Dose:  81 mg


Atorvastatin Calcium (Lipitor)  20 mg PO DAILY Replaced by [facility]


   Last Admin: 09/05/16 08:20 Dose:  20 mg


Divalproex Sodium (Depakote Dr(*Bid*))  500 mg PO BID Replaced by [facility]


   Last Admin: 09/05/16 08:20 Dose:  500 mg


Enalapril Maleate (Vasotec)  10 mg PO DAILY Replaced by [facility]


   Last Admin: 09/04/16 16:20 Dose:  10 mg


Famotidine (Pepcid)  20 mg PO BID Replaced by [facility]


   Last Admin: 09/05/16 08:22 Dose:  20 mg


Ibuprofen (Motrin Tab)  600 mg PO Q8 PRN


   PRN Reason: Pain, moderate (4-7)


   Last Admin: 07/24/16 21:23 Dose:  600 mg


Metoprolol Tartrate (Lopressor)  50 mg PO Q12 Replaced by [facility]


   Last Admin: 09/05/16 08:21 Dose:  50 mg


Nicotine (Nicoderm Cq)  1 patch TD DAILY Replaced by [facility]


   Last Admin: 09/05/16 08:21 Dose:  1 patch


Nitroglycerin (Nitrostat Sl Tab)  0.4 mg SL Q5M PRN


   PRN Reason: cp


   Last Admin: 08/02/16 07:37 Dose:  0.4 mg


Quetiapine Fumarate (Seroquel)  25 mg PO HS Replaced by [facility]


   Last Admin: 09/04/16 21:03 Dose:  25 mg











- Labs


Labs: 


 





 08/02/16 08:10 





 08/02/16 08:10 





 











PT  11.2 SECONDS (9.4-12.0)   12/14/15  05:20    


 


INR  1.05  (0.89-1.20)   12/14/15  05:20    


 


APTT  36.2 SECONDS (23.1-32.0)  H  12/14/15  05:20    














- Constitutional


Appears: Well, Non-toxic, No Acute Distress





- Head Exam


Head Exam: ATRAUMATIC, NORMAL INSPECTION, NORMOCEPHALIC





- Eye Exam


Eye Exam: EOMI, Normal appearance, PERRL


Pupil Exam: NORMAL ACCOMODATION, PERRL





- ENT Exam


ENT Exam: Mucous Membranes Moist, Normal Exam





- Neck Exam


Neck Exam: Full ROM, Normal Inspection.  absent: Lymphadenopathy





- Respiratory Exam


Respiratory Exam: Clear to Ausculation Bilateral, NORMAL BREATHING PATTERN





- Cardiovascular Exam


Cardiovascular Exam: REGULAR RHYTHM, +S1, +S2.  absent: Murmur





- GI/Abdominal Exam


GI & Abdominal Exam: Soft, Normal Bowel Sounds.  absent: Tenderness





- Extremities Exam


Extremities Exam: Full ROM, Normal Capillary Refill, Normal Inspection.  absent

: Joint Swelling, Pedal Edema





- Back Exam


Back Exam: NORMAL INSPECTION





- Neurological Exam


Neurological Exam: Alert, Awake, CN II-XII Intact, Normal Gait, Oriented x3





- Psychiatric Exam


Psychiatric exam: Normal Affect, Normal Mood





- Skin


Skin Exam: Dry, Intact, Normal Color, Warm





Assessment and Plan


(1) DVT prophylaxis


Status: Chronic





(2) CAD (coronary artery disease)


Status: Chronic





(3) Smoking


Status: Chronic





(4) Low back pain


Status: Chronic





(5) Scrotal edema


Status: Chronic





(6) Prediabetes


Status: Chronic





(7) Neurocognitive disorder


Status: Chronic








- Assessment and Plan (Free Text)


Assessment: 


(1) DVT prophylaxis


Assessment & Plan: 


scd and ae hsoe


ambulation


Status: Chronic





(2) CAD (coronary artery disease)


Assessment & Plan: 


cont meds


Status: Chronic





(3) Smoking


Assessment & Plan: 


nicoderm


Status: Chronic





(4) Low back pain


Assessment & Plan: 


ibuprofne prn


Status: Chronic





(5) Scrotal edema


Status: Chronic





(6) Prediabetes


Assessment & Plan: 


dietary


Status: Chronic





(7) Neurocognitive disorder


Assessment & Plan: 


cont meds


Status: Chronic

## 2016-09-06 RX ADMIN — DIVALPROEX SODIUM SCH MG: 250 TABLET, DELAYED RELEASE ORAL at 08:27

## 2016-09-06 RX ADMIN — DIVALPROEX SODIUM SCH MG: 250 TABLET, DELAYED RELEASE ORAL at 16:27

## 2016-09-06 NOTE — CP.PCM.PN
Subjective





- Date & Time of Evaluation


Date of Evaluation: 09/06/16


Time of Evaluation: 07:11





- Subjective


Subjective: 


comfortable/cooperative. no f/c, n/v/d


calm 


pending placement


ros negative





Objective





- Vital Signs/Intake and Output


Vital Signs (last 24 hours): 


 











Temp Pulse Resp BP Pulse Ox


 


 98.1 F   91 H  20   131/82   100 


 


 09/05/16 16:53  09/05/16 20:47  09/05/16 16:53  09/05/16 20:47  09/05/16 16:53











- Medications


Medications: 


 Current Medications





Aspirin (Ecotrin)  81 mg PO DAILY [facility]


   Last Admin: 09/05/16 08:20 Dose:  81 mg


Divalproex Sodium (Depakote Dr(*Bid*))  500 mg PO BID [facility]


   Last Admin: 09/05/16 16:46 Dose:  500 mg


Enalapril Maleate (Vasotec)  10 mg PO DAILY [facility]


   Last Admin: 09/05/16 16:47 Dose:  10 mg


Famotidine (Pepcid)  20 mg PO BID [facility]


   Last Admin: 09/05/16 16:47 Dose:  20 mg


Ibuprofen (Motrin Tab)  600 mg PO Q8 PRN


   PRN Reason: Pain, moderate (4-7)


   Last Admin: 07/24/16 21:23 Dose:  600 mg


Metoprolol Tartrate (Lopressor)  50 mg PO Q12 [facility]


   Last Admin: 09/05/16 20:47 Dose:  50 mg


Nicotine (Nicoderm Cq)  1 patch TD DAILY [facility]


   Last Admin: 09/05/16 08:21 Dose:  1 patch


Nitroglycerin (Nitrostat Sl Tab)  0.4 mg SL Q5M PRN


   PRN Reason: cp


   Last Admin: 08/02/16 07:37 Dose:  0.4 mg


Quetiapine Fumarate (Seroquel)  25 mg PO HS [facility]


   Last Admin: 09/05/16 21:21 Dose:  25 mg











- Labs


Labs: 


 





 08/02/16 08:10 





 08/02/16 08:10 





 











PT  11.2 SECONDS (9.4-12.0)   12/14/15  05:20    


 


INR  1.05  (0.89-1.20)   12/14/15  05:20    


 


APTT  36.2 SECONDS (23.1-32.0)  H  12/14/15  05:20    














- Constitutional


Appears: Well, Non-toxic, No Acute Distress





- Head Exam


Head Exam: ATRAUMATIC, NORMAL INSPECTION, NORMOCEPHALIC





- Eye Exam


Eye Exam: EOMI, Normal appearance, PERRL


Pupil Exam: NORMAL ACCOMODATION, PERRL





- ENT Exam


ENT Exam: Mucous Membranes Moist, Normal Exam





- Neck Exam


Neck Exam: Full ROM, Normal Inspection.  absent: Lymphadenopathy





- Respiratory Exam


Respiratory Exam: Clear to Ausculation Bilateral, NORMAL BREATHING PATTERN





- Cardiovascular Exam


Cardiovascular Exam: REGULAR RHYTHM, +S1, +S2.  absent: Murmur





- GI/Abdominal Exam


GI & Abdominal Exam: Soft, Normal Bowel Sounds.  absent: Tenderness





- Extremities Exam


Extremities Exam: Full ROM, Normal Capillary Refill, Normal Inspection.  absent

: Joint Swelling, Pedal Edema





- Back Exam


Back Exam: NORMAL INSPECTION





- Neurological Exam


Neurological Exam: Alert, Awake, CN II-XII Intact, Normal Gait, Oriented x3





- Psychiatric Exam


Psychiatric exam: Normal Affect, Normal Mood





- Skin


Skin Exam: Dry, Intact, Normal Color, Warm





Assessment and Plan


(1) DVT prophylaxis


Assessment & Plan: 


scd and ae hose


ambulation


Status: Chronic





(2) CAD (coronary artery disease)


Assessment & Plan: 


cont meds


Status: Chronic





(3) Smoking


Assessment & Plan: 


nicoderm


Status: Chronic





(4) Low back pain


Assessment & Plan: 


ibuprofen prn


Status: Chronic





(5) Scrotal edema


Status: Chronic





(6) Prediabetes


Assessment & Plan: 


ddietary


Status: Chronic





(7) Neurocognitive disorder


Assessment & Plan: 


cont meds


pending placement


Status: Chronic

## 2016-09-07 RX ADMIN — DIVALPROEX SODIUM SCH MG: 250 TABLET, DELAYED RELEASE ORAL at 08:54

## 2016-09-07 RX ADMIN — DIVALPROEX SODIUM SCH MG: 250 TABLET, DELAYED RELEASE ORAL at 16:11

## 2016-09-07 NOTE — CP.PCM.PN
Subjective





- Date & Time of Evaluation


Date of Evaluation: 09/07/16


Time of Evaluation: 08:09





- Subjective


Subjective: 


calm adn cooperative


pending placement


no f/c, n/v/d


no distress


ros negative





Objective





- Vital Signs/Intake and Output


Vital Signs (last 24 hours): 


 











Temp Pulse Resp BP Pulse Ox


 


 97.3 F L  67   18   125/78   97 


 


 09/07/16 08:01  09/07/16 08:01  09/07/16 08:01  09/07/16 08:01  09/07/16 08:01











- Medications


Medications: 


 Current Medications





Aspirin (Ecotrin)  81 mg PO DAILY [facility]


   Last Admin: 09/06/16 08:27 Dose:  81 mg


Divalproex Sodium (Depakote Dr(*Bid*))  500 mg PO BID [facility]


   Last Admin: 09/06/16 16:27 Dose:  500 mg


Enalapril Maleate (Vasotec)  10 mg PO DAILY [facility]


   Last Admin: 09/06/16 16:28 Dose:  10 mg


Famotidine (Pepcid)  20 mg PO BID [facility]


   Last Admin: 09/06/16 16:28 Dose:  20 mg


Ibuprofen (Motrin Tab)  600 mg PO Q8 PRN


   PRN Reason: Pain, moderate (4-7)


   Last Admin: 07/24/16 21:23 Dose:  600 mg


Metoprolol Tartrate (Lopressor)  50 mg PO Q12 [facility]


   Last Admin: 09/06/16 21:44 Dose:  Not Given


Nicotine (Nicoderm Cq)  1 patch TD DAILY [facility]


   Last Admin: 09/06/16 08:29 Dose:  1 patch


Nitroglycerin (Nitrostat Sl Tab)  0.4 mg SL Q5M PRN


   PRN Reason: cp


   Last Admin: 08/02/16 07:37 Dose:  0.4 mg


Quetiapine Fumarate (Seroquel)  25 mg PO HS [facility]


   Last Admin: 09/06/16 21:43 Dose:  25 mg











- Labs


Labs: 


 





 08/02/16 08:10 





 08/02/16 08:10 





 











PT  11.2 SECONDS (9.4-12.0)   12/14/15  05:20    


 


INR  1.05  (0.89-1.20)   12/14/15  05:20    


 


APTT  36.2 SECONDS (23.1-32.0)  H  12/14/15  05:20    














- Constitutional


Appears: Well, Non-toxic, No Acute Distress





- Head Exam


Head Exam: ATRAUMATIC, NORMAL INSPECTION, NORMOCEPHALIC





- Eye Exam


Eye Exam: EOMI, Normal appearance, PERRL


Pupil Exam: NORMAL ACCOMODATION, PERRL





- ENT Exam


ENT Exam: Mucous Membranes Moist, Normal Exam





- Neck Exam


Neck Exam: Full ROM, Normal Inspection.  absent: Lymphadenopathy





- Respiratory Exam


Respiratory Exam: Clear to Ausculation Bilateral, NORMAL BREATHING PATTERN





- Cardiovascular Exam


Cardiovascular Exam: REGULAR RHYTHM, RRR, +S1, +S2.  absent: Murmur





- GI/Abdominal Exam


GI & Abdominal Exam: Soft, Normal Bowel Sounds.  absent: Tenderness





- Extremities Exam


Extremities Exam: Full ROM, Normal Capillary Refill, Normal Inspection.  absent

: Joint Swelling, Pedal Edema





- Back Exam


Back Exam: Full ROM, NORMAL INSPECTION





- Neurological Exam


Neurological Exam: Alert, Awake, CN II-XII Intact, Normal Gait, Oriented x3





- Psychiatric Exam


Psychiatric exam: Normal Affect, Normal Mood





- Skin


Skin Exam: Dry, Intact, Normal Color, Warm





Assessment and Plan


(1) DVT prophylaxis


Status: Chronic





(2) CAD (coronary artery disease)


Status: Chronic





(3) Smoking


Status: Chronic





(4) Low back pain


Status: Chronic





(5) Scrotal edema


Status: Chronic





(6) Prediabetes


Status: Chronic





(7) Neurocognitive disorder


Status: Chronic








- Assessment and Plan (Free Text)


Assessment: 


(1) DVT prophylaxis


Assessment & Plan: 


scd and ae hose


ambulation


Status: Chronic





(2) CAD (coronary artery disease)


Assessment & Plan: 


cont meds


Status: Chronic





(3) Smoking


Assessment & Plan: 


nicoderm


Status: Chronic





(4) Low back pain


Assessment & Plan: 


ibuprofen prn


Status: Chronic





(5) Scrotal edema


Status: Chronic





(6) Prediabetes


Assessment & Plan: 


ddietary


Status: Chronic





(7) Neurocognitive disorder


Assessment & Plan: 


cont meds


pending placement


Status: Chronic

## 2016-09-08 RX ADMIN — DIVALPROEX SODIUM SCH MG: 250 TABLET, DELAYED RELEASE ORAL at 08:17

## 2016-09-08 RX ADMIN — DIVALPROEX SODIUM SCH MG: 250 TABLET, DELAYED RELEASE ORAL at 16:20

## 2016-09-08 NOTE — CP.PCM.PN
Subjective





- Date & Time of Evaluation


Date of Evaluation: 09/08/16


Time of Evaluation: 08:07





- Subjective


Subjective: 


dictated note


 no distress


pending placement


has guardian





Objective





- Vital Signs/Intake and Output


Vital Signs (last 24 hours): 


 











Temp Pulse Resp BP Pulse Ox


 


 97.5 F L  70   20   100/70   100 


 


 09/08/16 07:59  09/08/16 07:59  09/08/16 07:59  09/08/16 07:59  09/08/16 07:59











- Medications


Medications: 


 Current Medications





Aspirin (Ecotrin)  [facility]ILY [facility]


   Last Admin: 09/07/16 08:54 Dose:  81 mg


Divalproex Sodium (Depakote Dr(*Bid*))  500 mg PO BID [facility]


   Last Admin: 09/07/16 16:11 Dose:  500 mg


Enalapril Maleate (Vasotec)  10 mg PO DAILY [facility]


   Last Admin: 09/07/16 08:54 Dose:  10 mg


Famotidine (Pepcid)  20 mg PO BID [facility]


   Last Admin: 09/07/16 16:11 Dose:  20 mg


Ibuprofen (Motrin Tab)  600 mg PO Q8 PRN


   PRN Reason: Pain, moderate (4-7)


   Last Admin: 07/24/16 21:23 Dose:  600 mg


Metoprolol Tartrate (Lopressor)  50 mg PO Q12 [facility]


   Last Admin: 09/07/16 21:14 Dose:  50 mg


Nicotine (Nicoderm Cq)  1 patch TD DAILY [facility]


   Last Admin: 09/07/16 08:55 Dose:  1 patch


Nitroglycerin (Nitrostat Sl Tab)  0.4 mg SL Q5M PRN


   PRN Reason: cp


   Last Admin: 08/02/16 07:37 Dose:  0.4 mg


Quetiapine Fumarate (Seroquel)  25 mg PO HS [facility]


   Last Admin: 09/07/16 21:14 Dose:  25 mg











- Labs


Labs: 


 





 08/02/16 08:10 





 08/02/16 08:10 





 











PT  11.2 SECONDS (9.4-12.0)   12/14/15  05:20    


 


INR  1.05  (0.89-1.20)   12/14/15  05:20    


 


APTT  36.2 SECONDS (23.1-32.0)  H  12/14/15  05:20    














Assessment and Plan


(1) DVT prophylaxis


Status: Chronic





(2) CAD (coronary artery disease)


Status: Chronic





(3) Smoking


Status: Chronic





(4) Low back pain


Status: Chronic





(5) Scrotal edema


Status: Chronic





(6) Prediabetes


Status: Chronic





(7) Neurocognitive disorder


Status: Chronic

## 2016-09-08 NOTE — PN
DATE: 09/08/2016



The patient evaluated in bed.  In no distress.  No complaints.  No nausea, vomiting or diarrhea.  The
 patient has a guardian and is pending placement at this time.  Still remains unable to make his own 
decisions.



REVIEW OF SYSTEMS:  Negative.



PHYSICAL EXAMINATION:

GENERAL:  Alert and oriented at his baseline.

HEART:  Regular rate and rhythm.  No murmurs, rubs, or gallops.

LUNGS:  Clear in all fields bilaterally.

ABDOMEN:  Soft, nontender.  Bowel sounds x 4.

EXTREMITIES:  Distal pulses, motor and sensation intact.  Cap refill is brisk.



DIAGNOSES AND PLAN:  

1.  Coronary artery disease, status post cardiac arrest.  Continue medications.

2.  Neurocognitive disorder.  Continue all medications.  Has guardian, pending placement.  

3.  Smoking cessation.  Nicoderm patch.

4.  Intermittent low back pain.  Ibuprofen p.r.n. 

5.  Deep venous thrombosis prophylaxis.  SCD and AE hose.  

6.  Prediabetes.  Dietary control.  Blood work when due.  

7.  Will continue to follow patient through discharge.





__________________________________________

Fan SILVER







cc:



DD: 09/08/2016 07:33:39  [id number]

TT: 09/08/2016 08:24:30

Confirmation # [id number]

Dictation # [id number]

mn

## 2016-09-09 RX ADMIN — DIVALPROEX SODIUM SCH MG: 250 TABLET, DELAYED RELEASE ORAL at 08:56

## 2016-09-09 RX ADMIN — DIVALPROEX SODIUM SCH MG: 250 TABLET, DELAYED RELEASE ORAL at 16:09

## 2016-09-09 NOTE — CP.PCM.PN
Subjective





- Date & Time of Evaluation


Date of Evaluation: 09/09/16


Time of Evaluation: 12:06





- Subjective


Subjective: 


no f/c, n/v/d


calma dn cooperative


pending placement


ros negative





Objective





- Vital Signs/Intake and Output


Vital Signs (last 24 hours): 


 











Temp Pulse Resp BP Pulse Ox


 


 97.3 F L  66   20   102/59 L  99 


 


 09/09/16 07:36  09/09/16 08:55  09/09/16 07:36  09/09/16 08:55  09/09/16 07:36











- Medications


Medications: 


 Current Medications





Aspirin (Ecotrin)  81 mg PO DAILY [facility]


   Last Admin: 09/09/16 08:56 Dose:  81 mg


Divalproex Sodium (Depakote Dr(*Bid*))  500 mg PO BID [facility]


   Last Admin: 09/09/16 08:56 Dose:  500 mg


Enalapril Maleate (Vasotec)  10 mg PO DAILY [facility]


   Last Admin: 09/09/16 08:58 Dose:  10 mg


Famotidine (Pepcid)  20 mg PO BID [facility]


   Last Admin: 09/09/16 08:54 Dose:  20 mg


Ibuprofen (Motrin Tab)  600 mg PO Q8 PRN


   PRN Reason: Pain, moderate (4-7)


   Last Admin: 07/24/16 21:23 Dose:  600 mg


Metoprolol Tartrate (Lopressor)  50 mg PO Q12 [facility]


   Last Admin: 09/09/16 08:55 Dose:  Not Given


Nicotine (Nicoderm Cq)  1 patch TD DAILY [facility]


   Last Admin: 09/09/16 08:57 Dose:  1 patch


Nitroglycerin (Nitrostat Sl Tab)  0.4 mg SL Q5M PRN


   PRN Reason: cp


   Last Admin: 08/02/16 07:37 Dose:  0.4 mg


Quetiapine Fumarate (Seroquel)  25 mg PO HS [facility]


   Last Admin: 09/08/16 21:19 Dose:  25 mg











- Labs


Labs: 


 





 08/02/16 08:10 





 08/02/16 08:10 





 











PT  11.2 SECONDS (9.4-12.0)   12/14/15  05:20    


 


INR  1.05  (0.89-1.20)   12/14/15  05:20    


 


APTT  36.2 SECONDS (23.1-32.0)  H  12/14/15  05:20    














- Constitutional


Appears: Well, Non-toxic, No Acute Distress





- Head Exam


Head Exam: ATRAUMATIC, NORMAL INSPECTION, NORMOCEPHALIC





- Eye Exam


Eye Exam: EOMI, Normal appearance, PERRL


Pupil Exam: NORMAL ACCOMODATION, PERRL





- ENT Exam


ENT Exam: Mucous Membranes Moist, Normal Exam





- Neck Exam


Neck Exam: Full ROM, Normal Inspection.  absent: Lymphadenopathy





- Respiratory Exam


Respiratory Exam: Clear to Ausculation Bilateral, NORMAL BREATHING PATTERN





- Cardiovascular Exam


Cardiovascular Exam: REGULAR RHYTHM, RRR, +S1, +S2.  absent: Murmur





- GI/Abdominal Exam


GI & Abdominal Exam: Soft, Normal Bowel Sounds.  absent: Tenderness





- Extremities Exam


Extremities Exam: Full ROM, Normal Capillary Refill, Normal Inspection.  absent

: Joint Swelling, Pedal Edema





- Back Exam


Back Exam: Full ROM, NORMAL INSPECTION





- Neurological Exam


Neurological Exam: Alert, Awake, CN II-XII Intact, Normal Gait, Oriented x3





- Psychiatric Exam


Psychiatric exam: Normal Affect, Normal Mood





- Skin


Skin Exam: Dry, Intact, Normal Color, Warm





Assessment and Plan


(1) DVT prophylaxis


Status: Chronic





(2) CAD (coronary artery disease)


Status: Chronic





(3) Smoking


Status: Chronic





(4) Low back pain


Status: Chronic





(5) Scrotal edema


Status: Chronic





(6) Prediabetes


Status: Chronic





(7) Neurocognitive disorder


Status: Chronic








- Assessment and Plan (Free Text)


Assessment: 


(1) DVT prophylaxis


Assessment & Plan: 


scd and ae hsoe


ambulation


Status: Chronic





(2) CAD (coronary artery disease)


Assessment & Plan: 


cont meds


Status: Chronic





(3) Smoking


Assessment & Plan: 


nicoderm


Status: Chronic





(4) Low back pain


Assessment & Plan: 


ibuprofne prn


Status: Chronic





(5) Scrotal edema


Status: Chronic





(6) Prediabetes


Assessment & Plan: 


dietary


Status: Chronic





(7) Neurocognitive disorder


Assessment & Plan: 


cont meds


Status: Chronic

## 2016-09-10 RX ADMIN — DIVALPROEX SODIUM SCH MG: 250 TABLET, DELAYED RELEASE ORAL at 16:13

## 2016-09-10 RX ADMIN — DIVALPROEX SODIUM SCH MG: 250 TABLET, DELAYED RELEASE ORAL at 08:33

## 2016-09-10 NOTE — CP.PCM.PN
Subjective





- Date & Time of Evaluation


Date of Evaluation: 09/10/16


Time of Evaluation: 07:38





- Subjective


Subjective: 


calm and cooperative


 no distress


no compalitns


pending placement, ros negative





Objective





- Vital Signs/Intake and Output


Vital Signs (last 24 hours): 


 











Temp Pulse Resp BP Pulse Ox


 


 99.5 F   74   18   148/73   96 


 


 09/09/16 17:42  09/09/16 21:10  09/09/16 17:42  09/09/16 21:10  09/09/16 17:42











- Medications


Medications: 


 Current Medications





Aspirin (Ecotrin)  81 mg PO DAILY [facility]


   Last Admin: 09/09/16 08:56 Dose:  81 mg


Divalproex Sodium (Depakote Dr(*Bid*))  500 mg PO BID [facility]


   Last Admin: 09/09/16 16:09 Dose:  500 mg


Enalapril Maleate (Vasotec)  10 mg PO DAILY [facility]


   Last Admin: 09/09/16 08:58 Dose:  10 mg


Famotidine (Pepcid)  20 mg PO BID [facility]


   Last Admin: 09/09/16 16:09 Dose:  20 mg


Ibuprofen (Motrin Tab)  600 mg PO Q8 PRN


   PRN Reason: Pain, moderate (4-7)


   Last Admin: 07/24/16 21:23 Dose:  600 mg


Metoprolol Tartrate (Lopressor)  50 mg PO Q12 [facility]


   Last Admin: 09/09/16 21:10 Dose:  50 mg


Nicotine (Nicoderm Cq)  1 patch TD DAILY [facility]


   Last Admin: 09/09/16 08:57 Dose:  1 patch


Nitroglycerin (Nitrostat Sl Tab)  0.4 mg SL Q5M PRN


   PRN Reason: cp


   Last Admin: 08/02/16 07:37 Dose:  0.4 mg


Quetiapine Fumarate (Seroquel)  25 mg PO HS [facility]


   Last Admin: 09/09/16 21:10 Dose:  25 mg











- Labs


Labs: 


 





 08/02/16 08:10 





 08/02/16 08:10 





 











PT  11.2 SECONDS (9.4-12.0)   12/14/15  05:20    


 


INR  1.05  (0.89-1.20)   12/14/15  05:20    


 


APTT  36.2 SECONDS (23.1-32.0)  H  12/14/15  05:20    














- Constitutional


Appears: Well, Non-toxic, No Acute Distress





- Head Exam


Head Exam: ATRAUMATIC, NORMAL INSPECTION, NORMOCEPHALIC





- Eye Exam


Eye Exam: EOMI, Normal appearance, PERRL


Pupil Exam: NORMAL ACCOMODATION, PERRL





- ENT Exam


ENT Exam: Mucous Membranes Moist, Normal Exam





- Neck Exam


Neck Exam: Full ROM, Normal Inspection.  absent: Lymphadenopathy





- Respiratory Exam


Respiratory Exam: Clear to Ausculation Bilateral, NORMAL BREATHING PATTERN





- Cardiovascular Exam


Cardiovascular Exam: REGULAR RHYTHM, +S1, +S2.  absent: Murmur





- GI/Abdominal Exam


GI & Abdominal Exam: Soft, Normal Bowel Sounds.  absent: Tenderness





- Extremities Exam


Extremities Exam: Full ROM, Normal Capillary Refill, Normal Inspection.  absent

: Joint Swelling, Pedal Edema





- Back Exam


Back Exam: NORMAL INSPECTION





- Neurological Exam


Neurological Exam: Alert, Awake, CN II-XII Intact, Normal Gait, Oriented x3





- Psychiatric Exam


Psychiatric exam: Normal Affect, Normal Mood





- Skin


Skin Exam: Dry, Intact, Normal Color, Warm





Assessment and Plan


(1) DVT prophylaxis


Assessment & Plan: 


scd and ae hse


ambulation


Status: Chronic





(2) CAD (coronary artery disease)


Assessment & Plan: 


cont meds


Status: Chronic





(3) Smoking


Assessment & Plan: 


nicoderm


Status: Chronic





(4) Low back pain


Assessment & Plan: 


ibuprofne prn


Status: Chronic





(5) Scrotal edema


Status: Chronic





(6) Prediabetes


Assessment & Plan: 


dietary control


Status: Chronic





(7) Neurocognitive disorder


Assessment & Plan: 


cont meds, pending palcement


Status: Chronic

## 2016-09-11 RX ADMIN — DIVALPROEX SODIUM SCH MG: 250 TABLET, DELAYED RELEASE ORAL at 16:36

## 2016-09-11 RX ADMIN — DIVALPROEX SODIUM SCH MG: 250 TABLET, DELAYED RELEASE ORAL at 09:21

## 2016-09-11 NOTE — PN
DATE: 09/11/2016



The patient evaluated in bed is watching TV.  No distress, no complaints.  Calm 
and cooperative, pending placement.



REVIEW OF SYSTEMS:  Negative.



PHYSICAL EXAMINATION:

GENERAL:  Negative.  Alert and oriented to his baseline.

HEART:  Regular rate and rhythm.  No murmurs, rubs, or gallops.

LUNGS:  Clear in all fields bilaterally.

ABDOMEN:  Soft, nontender.  Bowel sounds x 4.

EXTREMITIES:  Distal pulses, motor sensation intact.  Cap refill is brisk.



DIAGNOSES AND PLAN: 

1.  Coronary artery disease, status post cardiac arrest.  Continue medicine.

2.  Neurocognitive disorder.  Continue medicine.

3.  Deep venous thrombosis prophylaxis.  Sequential compression devices, 
antiembolism hose, ambulation.

4.  Smoking.  Nicoderm patch.

5.  Intermittent low back pain.  Ibuprofen p.r.n.



The patient remains under this writer's care until thus time that the patient 
has appropriate placement.





__________________________________________

Fan SILVER







cc:   



DD: 09/11/2016 13:13:09  [id number]

TT: 09/11/2016 13:47:50

Confirmation # [id number]

Dictation # [id number]

en

MTDD

## 2016-09-11 NOTE — CP.PCM.PN
Subjective





- Date & Time of Evaluation


Date of Evaluation: 09/11/16


Time of Evaluation: 14:07





- Subjective


Subjective: 


dictated note


pending placement


no distress





Objective





- Vital Signs/Intake and Output


Vital Signs (last 24 hours): 


 











Temp Pulse Resp BP Pulse Ox


 


 96.8 F L  79   18   139/54 L  99 


 


 09/11/16 08:18  09/11/16 09:26  09/11/16 08:18  09/11/16 09:26  09/11/16 08:18











- Medications


Medications: 


 Current Medications





Aspirin (Ecotrin)  81 mg PO DAILY [facility]


   Last Admin: 09/11/16 09:22 Dose:  81 mg


Divalproex Sodium (Depakote Dr(*Bid*))  500 mg PO BID [facility]


   Last Admin: 09/11/16 09:21 Dose:  500 mg


Enalapril Maleate (Vasotec)  10 mg PO DAILY [facility]


   Last Admin: 09/11/16 09:22 Dose:  10 mg


Famotidine (Pepcid)  20 mg PO BID [facility]


   Last Admin: 09/11/16 09:22 Dose:  20 mg


Ibuprofen (Motrin Tab)  600 mg PO Q8 PRN


   PRN Reason: Pain, moderate (4-7)


   Last Admin: 07/24/16 21:23 Dose:  600 mg


Metoprolol Tartrate (Lopressor)  50 mg PO Q12 [facility]


   Last Admin: 09/11/16 09:26 Dose:  50 mg


Nicotine (Nicoderm Cq)  1 patch TD DAILY [facility]


   Last Admin: 09/11/16 09:22 Dose:  1 patch


Nitroglycerin (Nitrostat Sl Tab)  0.4 mg SL Q5M PRN


   PRN Reason: cp


   Last Admin: 08/02/16 07:37 Dose:  0.4 mg


Quetiapine Fumarate (Seroquel)  25 mg PO HS [facility]


   Last Admin: 09/10/16 20:59 Dose:  25 mg











- Labs


Labs: 


 





 08/02/16 08:10 





 08/02/16 08:10 





 











PT  11.2 SECONDS (9.4-12.0)   12/14/15  05:20    


 


INR  1.05  (0.89-1.20)   12/14/15  05:20    


 


APTT  36.2 SECONDS (23.1-32.0)  H  12/14/15  05:20    














Assessment and Plan


(1) DVT prophylaxis


Status: Chronic





(2) CAD (coronary artery disease)


Status: Chronic





(3) Smoking


Status: Chronic





(4) Low back pain


Status: Chronic





(5) Scrotal edema


Status: Chronic





(6) Prediabetes


Status: Chronic





(7) Neurocognitive disorder


Status: Chronic

## 2016-09-12 RX ADMIN — DIVALPROEX SODIUM SCH MG: 250 TABLET, DELAYED RELEASE ORAL at 16:33

## 2016-09-12 RX ADMIN — DIVALPROEX SODIUM SCH MG: 250 TABLET, DELAYED RELEASE ORAL at 09:00

## 2016-09-12 NOTE — PN
DATE: 09/12/2016



The patient evaluated in bed.  No distress.  No complaints.  Pending guardianship.



REVIEW OF SYSTEMS:  Negative.



PHYSICAL EXAMINATION:  Negative. 

GENERAL:  Alert and oriented at his baseline.

HEART:  Regular rate and rhythm.  No murmurs, rubs or gallops.

LUNGS:  Clear in all fields bilaterally.

ABDOMEN:  Soft, nontender.  Bowel sounds x 4.

EXTREMITIES:  Distal pulses, motor and sensation intact.  Cap refill is brisk.  



DIAGNOSES AND PLAN: 

1.  Coronary artery disease, status post cardiac arrest.  Continue all medications.  

2.  Smoking cessation.  Nicoderm patch.

3.  Intermittent low back pain.  Ibuprofen p.r.n. 

4.  Deep venous thrombosis prophylaxis.  SCD and AE hose.  

5.  Neurocognitive disorder.  Continue all medications.

6.  Guardianship is obtained.  Pending placement.  

7.  Will continue to monitor the patient through discharge.





__________________________________________

Fan SILVER







cc:



DD: 09/12/2016 07:39:00  [id number]

TT: 09/12/2016 08:31:52

Confirmation # [id number]

Dictation # [id number]

mn

## 2016-09-12 NOTE — CP.PCM.PN
Subjective





- Date & Time of Evaluation


Date of Evaluation: 09/12/16


Time of Evaluation: 08:07





- Subjective


Subjective: 


dictated note


no distress


no compalints


calma nd coopeartive


no f/c, n/v/d


pending placement





Objective





- Vital Signs/Intake and Output


Vital Signs (last 24 hours): 


 











Temp Pulse Resp BP Pulse Ox


 


 97 F L  70   20   109/69   98 


 


 09/12/16 07:47  09/12/16 07:47  09/12/16 07:47  09/12/16 07:47  09/12/16 07:47











- Medications


Medications: 


 Current Medications





Aspirin (Ecotrin)  81 mg PO DAILY [facility]


   Last Admin: 09/11/16 09:22 Dose:  81 mg


Divalproex Sodium (Depakote Dr(*Bid*))  500 mg PO BID [facility]


   Last Admin: 09/11/16 16:36 Dose:  500 mg


Enalapril Maleate (Vasotec)  10 mg PO DAILY [facility]


   Last Admin: 09/11/16 09:22 Dose:  10 mg


Famotidine (Pepcid)  20 mg PO BID [facility]


   Last Admin: 09/11/16 16:36 Dose:  20 mg


Ibuprofen (Motrin Tab)  600 mg PO Q8 PRN


   PRN Reason: Pain, moderate (4-7)


   Last Admin: 07/24/16 21:23 Dose:  600 mg


Metoprolol Tartrate (Lopressor)  50 mg PO Q12 [facility]


   Last Admin: 09/11/16 21:27 Dose:  50 mg


Nicotine (Nicoderm Cq)  1 patch TD DAILY [facility]


   Last Admin: 09/11/16 09:22 Dose:  1 patch


Nitroglycerin (Nitrostat Sl Tab)  0.4 mg SL Q5M PRN


   PRN Reason: cp


   Last Admin: 08/02/16 07:37 Dose:  0.4 mg


Quetiapine Fumarate (Seroquel)  25 mg PO HS [facility]


   Last Admin: 09/11/16 21:27 Dose:  25 mg











- Labs


Labs: 


 





 08/02/16 08:10 





 08/02/16 08:10 





 











PT  11.2 SECONDS (9.4-12.0)   12/14/15  05:20    


 


INR  1.05  (0.89-1.20)   12/14/15  05:20    


 


APTT  36.2 SECONDS (23.1-32.0)  H  12/14/15  05:20    














Assessment and Plan


(1) DVT prophylaxis


Status: Chronic





(2) CAD (coronary artery disease)


Status: Chronic





(3) Smoking


Status: Chronic





(4) Low back pain


Status: Chronic





(5) Scrotal edema


Status: Chronic





(6) Prediabetes


Status: Chronic





(7) Neurocognitive disorder


Status: Chronic

## 2016-09-13 RX ADMIN — DIVALPROEX SODIUM SCH MG: 250 TABLET, DELAYED RELEASE ORAL at 08:35

## 2016-09-13 RX ADMIN — DIVALPROEX SODIUM SCH MG: 250 TABLET, DELAYED RELEASE ORAL at 16:28

## 2016-09-13 NOTE — CP.PCM.PN
Subjective





- Date & Time of Evaluation


Date of Evaluation: 09/13/16


Time of Evaluation: 07:58





- Subjective


Subjective: 


no distress/complaints


no fc n/v/d


pending placement





Objective





- Vital Signs/Intake and Output


Vital Signs (last 24 hours): 


 











Temp Pulse Resp BP Pulse Ox


 


 97.1 F L  62   20   117/64   98 


 


 09/13/16 07:57  09/13/16 07:57  09/13/16 07:57  09/13/16 07:57  09/13/16 07:57











- Medications


Medications: 


 Current Medications





Aspirin (Ecotrin)  81 mg PO DAILY [facility]


   Last Admin: 09/12/16 08:11 Dose:  81 mg


Divalproex Sodium (Depakote Dr(*Bid*))  500 mg PO BID [facility]


   Last Admin: 09/12/16 16:33 Dose:  500 mg


Enalapril Maleate (Vasotec)  10 mg PO DAILY [facility]


   Last Admin: 09/12/16 08:14 Dose:  Not Given


Famotidine (Pepcid)  20 mg PO BID [facility]


   Last Admin: 09/12/16 16:33 Dose:  20 mg


Ibuprofen (Motrin Tab)  600 mg PO Q8 PRN


   PRN Reason: Pain, moderate (4-7)


   Last Admin: 07/24/16 21:23 Dose:  600 mg


Metoprolol Tartrate (Lopressor)  50 mg PO Q12 [facility]


   Last Admin: 09/12/16 21:10 Dose:  50 mg


Nicotine (Nicoderm Cq)  1 patch TD DAILY [facility]


   Last Admin: 09/12/16 08:13 Dose:  1 patch


Nitroglycerin (Nitrostat Sl Tab)  0.4 mg SL Q5M PRN


   PRN Reason: cp


   Last Admin: 08/02/16 07:37 Dose:  0.4 mg


Quetiapine Fumarate (Seroquel)  25 mg PO HS [facility]


   Last Admin: 09/12/16 21:10 Dose:  25 mg











- Labs


Labs: 


 





 08/02/16 08:10 





 08/02/16 08:10 





 











PT  11.2 SECONDS (9.4-12.0)   12/14/15  05:20    


 


INR  1.05  (0.89-1.20)   12/14/15  05:20    


 


APTT  36.2 SECONDS (23.1-32.0)  H  12/14/15  05:20    














- Constitutional


Appears: Well, Non-toxic, No Acute Distress





- Head Exam


Head Exam: ATRAUMATIC, NORMAL INSPECTION, NORMOCEPHALIC





- Eye Exam


Eye Exam: EOMI, Normal appearance, PERRL


Pupil Exam: NORMAL ACCOMODATION, PERRL





- ENT Exam


ENT Exam: Mucous Membranes Moist, Normal Exam





- Neck Exam


Neck Exam: Full ROM, Normal Inspection.  absent: Lymphadenopathy





- Respiratory Exam


Respiratory Exam: Clear to Ausculation Bilateral, NORMAL BREATHING PATTERN





- Cardiovascular Exam


Cardiovascular Exam: REGULAR RHYTHM, RRR, +S1, +S2.  absent: Murmur





- GI/Abdominal Exam


GI & Abdominal Exam: Soft, Normal Bowel Sounds.  absent: Tenderness





- Extremities Exam


Extremities Exam: Full ROM, Normal Capillary Refill, Normal Inspection.  absent

: Joint Swelling, Pedal Edema





- Back Exam


Back Exam: NORMAL INSPECTION





- Neurological Exam


Neurological Exam: Alert, Awake, CN II-XII Intact, Normal Gait, Oriented x3





- Psychiatric Exam


Psychiatric exam: Normal Affect, Normal Mood





- Skin


Skin Exam: Dry, Intact, Normal Color, Warm





Assessment and Plan


(1) DVT prophylaxis


Assessment & Plan: 


scd adn ae hose


ambulation


Status: Chronic





(2) CAD (coronary artery disease)


Assessment & Plan: 


cont meds


Status: Chronic





(3) Smoking


Assessment & Plan: 


nicoderm


Status: Chronic





(4) Low back pain


Assessment & Plan: 


ibuprofen prn


Status: Chronic





(5) Scrotal edema


Status: Chronic





(6) Prediabetes


Assessment & Plan: 


dietary


Status: Chronic





(7) Neurocognitive disorder


Assessment & Plan: 


cnont meds


pending placement


Status: Chronic

## 2016-09-14 RX ADMIN — DIVALPROEX SODIUM SCH MG: 250 TABLET, DELAYED RELEASE ORAL at 16:01

## 2016-09-14 RX ADMIN — DIVALPROEX SODIUM SCH MG: 250 TABLET, DELAYED RELEASE ORAL at 08:25

## 2016-09-14 NOTE — CP.PCM.PN
Subjective





- Date & Time of Evaluation


Date of Evaluation: 09/14/16


Time of Evaluation: 07:07





- Subjective


Subjective: 


dictated note [id number]


calma dn cooperative


 pending placement





Objective





- Vital Signs/Intake and Output


Vital Signs (last 24 hours): 


 











Temp Pulse Resp BP Pulse Ox


 


 97 F L  95 H  18   107/65   96 


 


 09/14/16 00:57  09/14/16 00:57  09/14/16 00:57  09/14/16 00:57  09/14/16 00:57











- Medications


Medications: 


 Current Medications





Aspirin (Ecotrin)  81 mg PO DAILY [facility]


   Last Admin: 09/13/16 08:36 Dose:  81 mg


Divalproex Sodium (Depakote Dr(*Bid*))  500 mg PO BID [facility]


   Last Admin: 09/13/16 16:28 Dose:  500 mg


Enalapril Maleate (Vasotec)  10 mg PO DAILY [facility]


   Last Admin: 09/13/16 08:36 Dose:  10 mg


Famotidine (Pepcid)  20 mg PO BID [facility]


   Last Admin: 09/13/16 16:28 Dose:  20 mg


Ibuprofen (Motrin Tab)  600 mg PO Q8 PRN


   PRN Reason: Pain, moderate (4-7)


   Last Admin: 07/24/16 21:23 Dose:  600 mg


Metoprolol Tartrate (Lopressor)  50 mg PO Q12 [facility]


   Last Admin: 09/13/16 21:22 Dose:  Not Given


Nicotine (Nicoderm Cq)  1 patch TD DAILY [facility]


   Last Admin: 09/13/16 08:36 Dose:  1 patch


Nitroglycerin (Nitrostat Sl Tab)  0.4 mg SL Q5M PRN


   PRN Reason: cp


   Last Admin: 08/02/16 07:37 Dose:  0.4 mg


Quetiapine Fumarate (Seroquel)  25 mg PO HS [facility]


   Last Admin: 09/13/16 21:21 Dose:  25 mg











- Labs


Labs: 


 





 08/02/16 08:10 





 08/02/16 08:10 





 











PT  11.2 SECONDS (9.4-12.0)   12/14/15  05:20    


 


INR  1.05  (0.89-1.20)   12/14/15  05:20    


 


APTT  36.2 SECONDS (23.1-32.0)  H  12/14/15  05:20    














Assessment and Plan


(1) DVT prophylaxis


Status: Chronic





(2) CAD (coronary artery disease)


Status: Chronic





(3) Smoking


Status: Chronic





(4) Low back pain


Status: Chronic





(5) Scrotal edema


Status: Chronic





(6) Prediabetes


Status: Chronic





(7) Neurocognitive disorder


Status: Chronic

## 2016-09-14 NOTE — PN
DATE: 09/14/2016



SUBJECTIVE:

The patient was evaluated in bed, in no distress, no complaints. No fever, 
chills, nausea, vomiting, or diarrhea.  The patient remainspendign  placement.



REVIEW OF SYSTEMS:

Negative.



PHYSICAL EXAMINATION:

Negative.



CAD status post cardiac arrest.  Continue medication for neurocognitive 
disorder.  pending placement Nicoderm for smoking cessation and ibuprofen 
p.r.n. for intermittent chronic low back pain.  Deep venous thrombosis 
prophylaxis, SCDs and AE hose.  We will continue to follow the patient through 
discharge.





__________________________________________

Fan SILVER







cc:   



DD: 09/14/2016 08:19:56  1505

TT: 09/14/2016 09:00:36

Confirmation # [id number]

Dictation # [id number]

hn

MTDD

## 2016-09-15 RX ADMIN — DIVALPROEX SODIUM SCH MG: 250 TABLET, DELAYED RELEASE ORAL at 16:20

## 2016-09-15 RX ADMIN — DIVALPROEX SODIUM SCH MG: 250 TABLET, DELAYED RELEASE ORAL at 08:36

## 2016-09-15 NOTE — PN
DATE: 09/15/2016



The patient evaluated in bed, in no distress.  No complaints.  No fever, chills
, nausea, vomiting, diarrhea.  remains baseline.



PHYSICAL EXAMINATION: 

HEART:  Regular rate and rhythm.  No murmurs, rubs, or gallops.

LUNGS:  Clear in all fields bilaterally.

ABDOMEN:  Soft, nontender.  Bowel sounds x 4.

EXTREMITIES:  Distal pulses, motor and sensation intact.  Cap refill is brisk.



DIAGNOSES AND PLAN:

1.  Coronary artery disease, status post cardiac arrest.  Continue medication.

2.  Neurocognitive disorder.  Continue medication pending placement.

3.  Smoking.  Nicoderm patch.

4.  Intermittent low back pain.  Ibuprofen p.r.n.

5.  Deep venous thrombosis prophylaxis.  Sequential compression devices and anti
-embolism hose.



We will continue to monitor through the patient's placement.





__________________________________________

Fan SILVER







cc:   



DD: 09/15/2016 08:26:54  1505

TT: 09/15/2016 08:54:50

Confirmation # [id number]

Dictation # [id number]

jn

MTDD

## 2016-09-15 NOTE — CP.PCM.PN
Subjective





- Date & Time of Evaluation


Date of Evaluation: 09/15/16


Time of Evaluation: 07:14





- Subjective


Subjective: 


dictated note [id number]


no distress/complaints


pending placement


no f/c, n/v/d





Objective





- Vital Signs/Intake and Output


Vital Signs (last 24 hours): 


 











Temp Pulse Resp BP Pulse Ox


 


 98.2 F   70   20   110/72   98 


 


 09/14/16 16:08  09/14/16 21:23  09/14/16 16:08  09/14/16 21:23  09/14/16 16:08











- Medications


Medications: 


 Current Medications





Aspirin (Ecotrin)  81 mg PO DAILY [facility]


   Last Admin: 09/14/16 08:25 Dose:  81 mg


Divalproex Sodium (Depakote Dr(*Bid*))  500 mg PO BID [facility]


   Last Admin: 09/14/16 16:01 Dose:  500 mg


Enalapril Maleate (Vasotec)  10 mg PO DAILY [facility]


   Last Admin: 09/14/16 08:27 Dose:  10 mg


Famotidine (Pepcid)  20 mg PO BID [facility]


   Last Admin: 09/14/16 16:01 Dose:  20 mg


Ibuprofen (Motrin Tab)  600 mg PO Q8 PRN


   PRN Reason: Pain, moderate (4-7)


   Last Admin: 07/24/16 21:23 Dose:  600 mg


Metoprolol Tartrate (Lopressor)  50 mg PO Q12 [facility]


   Last Admin: 09/14/16 21:23 Dose:  50 mg


Nicotine (Nicoderm Cq)  1 patch TD DAILY [facility]


   Last Admin: 09/14/16 08:26 Dose:  1 patch


Nitroglycerin (Nitrostat Sl Tab)  0.4 mg SL Q5M PRN


   PRN Reason: cp


   Last Admin: 08/02/16 07:37 Dose:  0.4 mg


Quetiapine Fumarate (Seroquel)  25 mg PO HS [facility]


   Last Admin: 09/14/16 21:23 Dose:  25 mg











- Labs


Labs: 


 





 08/02/16 08:10 





 08/02/16 08:10 





 











PT  11.2 SECONDS (9.4-12.0)   12/14/15  05:20    


 


INR  1.05  (0.89-1.20)   12/14/15  05:20    


 


APTT  36.2 SECONDS (23.1-32.0)  H  12/14/15  05:20    














Assessment and Plan


(1) DVT prophylaxis


Status: Chronic





(2) CAD (coronary artery disease)


Status: Chronic





(3) Smoking


Status: Chronic





(4) Low back pain


Status: Chronic





(5) Scrotal edema


Status: Chronic





(6) Prediabetes


Status: Chronic





(7) Neurocognitive disorder


Status: Chronic

## 2016-09-16 RX ADMIN — DIVALPROEX SODIUM SCH MG: 250 TABLET, DELAYED RELEASE ORAL at 17:19

## 2016-09-16 RX ADMIN — DIVALPROEX SODIUM SCH MG: 250 TABLET, DELAYED RELEASE ORAL at 10:07

## 2016-09-16 NOTE — PN
DATE: 09/16/2016



The patient evaluated in bed.  No distress, no complaints.  No fever, chills, 
nausea, vomiting, diarrhea.  Remains pending placement.



REVIEW OF SYSTEMS:  Negative.



PHYSICAL EXAMINATION:

GENERAL:  Alert and oriented at baseline.

HEART:  Regular rate and rhythm.  No murmurs, rubs, or gallops.

LUNGS:  Clear in all fields bilaterally.

ABDOMEN:  Soft, nontender.  Bowel sounds x 4.

EXTREMITIES:  Distal pulses, motor sensation intact.  Cap refill is brisk.



DIAGNOSES AND PLAN:

1.  Coronary artery disease, status post cardiac arrest.  Continue medications.

2.  Neurocognitive disorder.  Continue medications.

3.  Ibuprofen for chronic low back pain.

4.  Deep venous thrombosis prophylaxis.  Continue with ambulation.

5.  Smoking cessation.  Continue Nicoderm patch.



We will continue to monitor patient until he has adequate placement.





__________________________________________

Fan SILVER







cc:   



DD: 09/16/2016 09:44:44  1505

TT: 09/16/2016 10:15:57

Confirmation # [id number]

Dictation # [id number]

en

MTDD

## 2016-09-16 NOTE — CP.PCM.PN
Subjective





- Date & Time of Evaluation


Date of Evaluation: 09/16/16


Time of Evaluation: 08:04





- Subjective


Subjective: 


dictated note [id number]


no distress


pending placement





Objective





- Vital Signs/Intake and Output


Vital Signs (last 24 hours): 


 











Temp Pulse Resp BP Pulse Ox


 


 98 F   91 H  20   124/60   97 


 


 09/15/16 16:19  09/15/16 21:12  09/15/16 16:19  09/15/16 21:12  09/15/16 16:19











- Medications


Medications: 


 Current Medications





Aspirin (Ecotrin)  81 mg PO DAILY [facility]


   Last Admin: 09/15/16 08:36 Dose:  81 mg


Divalproex Sodium (Depakote Dr(*Bid*))  500 mg PO BID [facility]


   Last Admin: 09/15/16 16:20 Dose:  500 mg


Enalapril Maleate (Vasotec)  10 mg PO DAILY [facility]


   Last Admin: 09/15/16 08:37 Dose:  10 mg


Famotidine (Pepcid)  20 mg PO BID [facility]


   Last Admin: 09/15/16 16:22 Dose:  20 mg


Ibuprofen (Motrin Tab)  600 mg PO Q8 PRN


   PRN Reason: Pain, moderate (4-7)


   Last Admin: 07/24/16 21:23 Dose:  600 mg


Metoprolol Tartrate (Lopressor)  50 mg PO Q12 [facility]


   Last Admin: 09/15/16 21:12 Dose:  50 mg


Nicotine (Nicoderm Cq)  1 patch TD DAILY [facility]


   Last Admin: 09/15/16 08:35 Dose:  1 patch


Nitroglycerin (Nitrostat Sl Tab)  0.4 mg SL Q5M PRN


   PRN Reason: cp


   Last Admin: 08/02/16 07:37 Dose:  0.4 mg


Quetiapine Fumarate (Seroquel)  25 mg PO HS [facility]


   Last Admin: 09/15/16 21:12 Dose:  25 mg











- Labs


Labs: 


 





 08/02/16 08:10 





 08/02/16 08:10 





 











PT  11.2 SECONDS (9.4-12.0)   12/14/15  05:20    


 


INR  1.05  (0.89-1.20)   12/14/15  05:20    


 


APTT  36.2 SECONDS (23.1-32.0)  H  12/14/15  05:20    














Assessment and Plan


(1) DVT prophylaxis


Status: Chronic





(2) CAD (coronary artery disease)


Status: Chronic





(3) Smoking


Status: Chronic





(4) Low back pain


Status: Chronic





(5) Scrotal edema


Status: Chronic





(6) Prediabetes


Status: Chronic





(7) Neurocognitive disorder


Status: Chronic

## 2016-09-17 RX ADMIN — DIVALPROEX SODIUM SCH MG: 250 TABLET, DELAYED RELEASE ORAL at 17:05

## 2016-09-17 RX ADMIN — DIVALPROEX SODIUM SCH MG: 250 TABLET, DELAYED RELEASE ORAL at 09:40

## 2016-09-17 NOTE — PN
DATE: 09/17/2016



SUBJECTIVE:  The patient was examined in bed, no distress, no complaints.  No fevers, chills, nausea,
 vomiting, diarrhea.  Pending placement.



REVIEW OF SYSTEMS:  Negative.



PHYSICAL EXAMINATION:

GENERAL:  Alert and oriented, at his baseline.

HEART:  Regular rate and rhythm.  No murmurs, rubs or gallops.

LUNGS:  Clear in all fields bilaterally.

ABDOMEN:  Soft, nontender.  Bowel sounds x 4.

EXTREMITIES:  Distal pulses, motor sensation intact.  Cap refill is brisk.



DIAGNOSES AND PLAN:  

1.  Coronary artery disease status post cardiac arrest.  Continue his medications.    

2.  Neurocognitive disorder, continue medications.  Pending placement.

3.  Chronic intermittent low back pain, ibuprofen p.r.n. 

4.  Smoking cessation, Nicoderm patch.  

5.  Deep venous thrombosis prophylaxis, sequential compression devices and anti-embolism hose.  



Will continue to monitor patient, for placement.





__________________________________________

Fan SILVER







cc:



DD: 09/17/2016 08:55:56  [id number]

TT: 09/17/2016 09:20:08

Confirmation # [id number]

Dictation # [id number]

ln

## 2016-09-17 NOTE — CP.PCM.PN
Subjective





- Date & Time of Evaluation


Date of Evaluation: 09/17/16


Time of Evaluation: 08:17





- Subjective


Subjective: 


dictated note. no distress


no complaints


 no f/c, n/v/d


pending placement





Objective





- Vital Signs/Intake and Output


Vital Signs (last 24 hours): 


 











Temp Pulse Resp BP Pulse Ox


 


 98.1 F   78   20   134/70   98 


 


 09/17/16 08:13  09/17/16 08:13  09/17/16 08:13  09/17/16 08:13  09/17/16 08:13











- Medications


Medications: 


 Current Medications





Aspirin (Ecotrin)  81 mg PO DAILY [facility]


   Last Admin: 09/16/16 10:07 Dose:  81 mg


Divalproex Sodium (Depakote Dr(*Bid*))  500 mg PO BID [facility]


   Last Admin: 09/16/16 17:19 Dose:  500 mg


Enalapril Maleate (Vasotec)  10 mg PO DAILY [facility]


   Last Admin: 09/16/16 10:09 Dose:  10 mg


Famotidine (Pepcid)  20 mg PO BID [facility]


   Last Admin: 09/16/16 17:19 Dose:  20 mg


Ibuprofen (Motrin Tab)  600 mg PO Q8 PRN


   PRN Reason: Pain, moderate (4-7)


   Last Admin: 07/24/16 21:23 Dose:  600 mg


Metoprolol Tartrate (Lopressor)  50 mg PO Q12 [facility]


   Last Admin: 09/16/16 21:10 Dose:  50 mg


Nicotine (Nicoderm Cq)  1 patch TD DAILY [facility]


   Last Admin: 09/16/16 10:07 Dose:  1 patch


Nitroglycerin (Nitrostat Sl Tab)  0.4 mg SL Q5M PRN


   PRN Reason: cp


   Last Admin: 08/02/16 07:37 Dose:  0.4 mg


Quetiapine Fumarate (Seroquel)  25 mg PO HS [facility]


   Last Admin: 09/16/16 21:10 Dose:  25 mg











- Labs


Labs: 


 





 08/02/16 08:10 





 08/02/16 08:10 





 











PT  11.2 SECONDS (9.4-12.0)   12/14/15  05:20    


 


INR  1.05  (0.89-1.20)   12/14/15  05:20    


 


APTT  36.2 SECONDS (23.1-32.0)  H  12/14/15  05:20    














Assessment and Plan


(1) DVT prophylaxis


Status: Chronic





(2) CAD (coronary artery disease)


Status: Chronic





(3) Smoking


Status: Chronic





(4) Low back pain


Status: Chronic





(5) Scrotal edema


Status: Chronic





(6) Prediabetes


Status: Chronic





(7) Neurocognitive disorder


Status: Chronic

## 2016-09-18 RX ADMIN — DIVALPROEX SODIUM SCH MG: 250 TABLET, DELAYED RELEASE ORAL at 08:50

## 2016-09-18 RX ADMIN — DIVALPROEX SODIUM SCH MG: 250 TABLET, DELAYED RELEASE ORAL at 16:17

## 2016-09-18 NOTE — CP.PCM.PN
Subjective





- Date & Time of Evaluation


Date of Evaluation: 09/18/16


Time of Evaluation: 17:41





- Subjective


Subjective: 


calm and coopeative. no distress


 no compalitns


 no f/c, n/v/d


penidng placement





Objective





- Vital Signs/Intake and Output


Vital Signs (last 24 hours): 


 











Temp Pulse Resp BP Pulse Ox


 


 97.3 F L  71   20   115/76   99 


 


 09/18/16 16:18  09/18/16 16:18  09/18/16 16:18  09/18/16 16:18  09/18/16 16:18











- Medications


Medications: 


 Current Medications





Aspirin (Ecotrin)  81 mg PO DAILY [facility]


   Last Admin: 09/18/16 08:50 Dose:  81 mg


Divalproex Sodium (Depakote Dr(*Bid*))  500 mg PO BID [facility]


   Last Admin: 09/18/16 16:17 Dose:  500 mg


Enalapril Maleate (Vasotec)  10 mg PO DAILY [facility]


   Last Admin: 09/18/16 08:51 Dose:  10 mg


Famotidine (Pepcid)  20 mg PO BID [facility]


   Last Admin: 09/18/16 16:19 Dose:  20 mg


Ibuprofen (Motrin Tab)  600 mg PO Q8 PRN


   PRN Reason: Pain, moderate (4-7)


   Last Admin: 09/18/16 08:56 Dose:  600 mg


Metoprolol Tartrate (Lopressor)  50 mg PO Q12 [facility]


   Last Admin: 09/18/16 08:52 Dose:  50 mg


Nicotine (Nicoderm Cq)  1 patch TD DAILY [facility]


   Last Admin: 09/18/16 08:52 Dose:  1 patch


Nitroglycerin (Nitrostat Sl Tab)  0.4 mg SL Q5M PRN


   PRN Reason: cp


   Last Admin: 08/02/16 07:37 Dose:  0.4 mg


Quetiapine Fumarate (Seroquel)  25 mg PO HS [facility]


   Last Admin: 09/17/16 22:00 Dose:  25 mg











- Labs


Labs: 


 





 08/02/16 08:10 





 08/02/16 08:10 





 











PT  11.2 SECONDS (9.4-12.0)   12/14/15  05:20    


 


INR  1.05  (0.89-1.20)   12/14/15  05:20    


 


APTT  36.2 SECONDS (23.1-32.0)  H  12/14/15  05:20    














- Constitutional


Appears: Well, Non-toxic, No Acute Distress





- Head Exam


Head Exam: ATRAUMATIC, NORMAL INSPECTION, NORMOCEPHALIC





- Eye Exam


Eye Exam: EOMI, Normal appearance, PERRL


Pupil Exam: NORMAL ACCOMODATION, PERRL





- ENT Exam


ENT Exam: Mucous Membranes Moist, Normal Exam





- Neck Exam


Neck Exam: Full ROM, Normal Inspection.  absent: Lymphadenopathy





- Respiratory Exam


Respiratory Exam: Clear to Ausculation Bilateral, NORMAL BREATHING PATTERN





- Cardiovascular Exam


Cardiovascular Exam: REGULAR RHYTHM, RRR, +S1, +S2.  absent: Murmur





- GI/Abdominal Exam


GI & Abdominal Exam: Soft, Normal Bowel Sounds.  absent: Tenderness





- Extremities Exam


Extremities Exam: Full ROM, Normal Capillary Refill, Normal Inspection.  absent

: Joint Swelling, Pedal Edema





- Back Exam


Back Exam: NORMAL INSPECTION





- Neurological Exam


Neurological Exam: Alert, Awake, CN II-XII Intact, Normal Gait, Oriented x3





- Psychiatric Exam


Psychiatric exam: Normal Affect, Normal Mood





- Skin


Skin Exam: Dry, Intact, Normal Color, Warm





Assessment and Plan


(1) DVT prophylaxis


Status: Chronic





(2) CAD (coronary artery disease)


Status: Chronic





(3) Smoking


Status: Chronic





(4) Low back pain


Status: Chronic





(5) Scrotal edema


Status: Chronic





(6) Prediabetes


Status: Chronic





(7) Neurocognitive disorder


Status: Chronic








- Assessment and Plan (Free Text)


Assessment: 


(1) DVT prophylaxis


Assessment & Plan: 


scd and aehose


ambulation


Status: Chronic





(2) CAD (coronary artery disease)


Assessment & Plan: 


cont meds


Status: Chronic





(3) Smoking


Assessment & Plan: 


nicoderm


Status: Chronic





(4) Low back pain


Assessment & Plan: 


ibuprofne prn


Status: Chronic





(5) Scrotal edema


Status: Chronic





(6) Prediabetes


Assessment & Plan: 


dietary


Status: Chronic





(7) Neurocognitive disorder


Assessment & Plan: 


cont meds


Status: Chronic

## 2016-09-19 RX ADMIN — DIVALPROEX SODIUM SCH MG: 250 TABLET, DELAYED RELEASE ORAL at 16:41

## 2016-09-19 RX ADMIN — DIVALPROEX SODIUM SCH MG: 250 TABLET, DELAYED RELEASE ORAL at 09:14

## 2016-09-19 NOTE — CP.PCM.PN
Subjective





- Date & Time of Evaluation


Date of Evaluation: 09/19/16


Time of Evaluation: 07:41





- Subjective


Subjective: 


no compalints, distress


no f/c, n/v/d


pendin gplacement





Objective





- Vital Signs/Intake and Output


Vital Signs (last 24 hours): 


 











Temp Pulse Resp BP Pulse Ox


 


 97.3 F L  71   20   129/77   99 


 


 09/18/16 16:18  09/18/16 21:07  09/18/16 16:18  09/18/16 21:07  09/18/16 16:18











- Medications


Medications: 


 Current Medications





Aspirin (Ecotrin)  81 mg PO DAILY [facility]


   Last Admin: 09/18/16 08:50 Dose:  81 mg


Divalproex Sodium (Depakote Dr(*Bid*))  500 mg PO BID [facility]


   Last Admin: 09/18/16 16:17 Dose:  500 mg


Enalapril Maleate (Vasotec)  10 mg PO DAILY [facility]


   Last Admin: 09/18/16 08:51 Dose:  10 mg


Famotidine (Pepcid)  20 mg PO BID [facility]


   Last Admin: 09/18/16 16:19 Dose:  20 mg


Ibuprofen (Motrin Tab)  600 mg PO Q8 PRN


   PRN Reason: Pain, moderate (4-7)


   Last Admin: 09/18/16 08:56 Dose:  600 mg


Metoprolol Tartrate (Lopressor)  50 mg PO Q12 [facility]


   Last Admin: 09/18/16 21:07 Dose:  50 mg


Nicotine (Nicoderm Cq)  1 patch TD DAILY [facility]


   Last Admin: 09/18/16 08:52 Dose:  1 patch


Nitroglycerin (Nitrostat Sl Tab)  0.4 mg SL Q5M PRN


   PRN Reason: cp


   Last Admin: 08/02/16 07:37 Dose:  0.4 mg


Quetiapine Fumarate (Seroquel)  25 mg PO HS [facility]


   Last Admin: 09/18/16 21:07 Dose:  25 mg











- Labs


Labs: 


 





 08/02/16 08:10 





 08/02/16 08:10 





 











PT  11.2 SECONDS (9.4-12.0)   12/14/15  05:20    


 


INR  1.05  (0.89-1.20)   12/14/15  05:20    


 


APTT  36.2 SECONDS (23.1-32.0)  H  12/14/15  05:20    














- Constitutional


Appears: Well, Non-toxic, No Acute Distress





- Head Exam


Head Exam: ATRAUMATIC, NORMAL INSPECTION, NORMOCEPHALIC





- Eye Exam


Eye Exam: EOMI, Normal appearance, PERRL


Pupil Exam: NORMAL ACCOMODATION, PERRL





- ENT Exam


ENT Exam: Mucous Membranes Moist, Normal Exam





- Neck Exam


Neck Exam: Full ROM, Normal Inspection.  absent: Lymphadenopathy





- Respiratory Exam


Respiratory Exam: Clear to Ausculation Bilateral, NORMAL BREATHING PATTERN





- Cardiovascular Exam


Cardiovascular Exam: REGULAR RHYTHM, +S1, +S2.  absent: Murmur





- GI/Abdominal Exam


GI & Abdominal Exam: Soft, Normal Bowel Sounds.  absent: Tenderness





- Extremities Exam


Extremities Exam: Full ROM, Normal Capillary Refill, Normal Inspection.  absent

: Joint Swelling, Pedal Edema





- Back Exam


Back Exam: NORMAL INSPECTION





- Neurological Exam


Neurological Exam: Alert, Awake, CN II-XII Intact, Normal Gait, Oriented x3





- Psychiatric Exam


Psychiatric exam: Normal Affect, Normal Mood





- Skin


Skin Exam: Dry, Intact, Normal Color, Warm





Assessment and Plan


(1) DVT prophylaxis


Status: Chronic





(2) CAD (coronary artery disease)


Status: Chronic





(3) Smoking


Status: Chronic





(4) Low back pain


Status: Chronic





(5) Scrotal edema


Status: Chronic





(6) Prediabetes


Status: Chronic





(7) Neurocognitive disorder


Status: Chronic








- Assessment and Plan (Free Text)


Assessment: 


(1) DVT prophylaxis


Assessment & Plan: 


scd and aehose


ambulation


Status: Chronic





(2) CAD (coronary artery disease)


Assessment & Plan: 


cont meds


Status: Chronic





(3) Smoking


Assessment & Plan: 


nicoderm


Status: Chronic





(4) Low back pain


Assessment & Plan: 


ibuprofne prn


Status: Chronic





(5) Scrotal edema


Status: Chronic





(6) Prediabetes


Assessment & Plan: 


dietary


Status: Chronic





(7) Neurocognitive disorder


Assessment & Plan: 


cont meds


Status: Chronic

## 2016-09-20 RX ADMIN — DIVALPROEX SODIUM SCH MG: 250 TABLET, DELAYED RELEASE ORAL at 09:38

## 2016-09-20 RX ADMIN — DIVALPROEX SODIUM SCH MG: 250 TABLET, DELAYED RELEASE ORAL at 18:45

## 2016-09-20 NOTE — CP.PCM.PN
Subjective





- Date & Time of Evaluation


Date of Evaluation: 09/20/16


Time of Evaluation: 07:12





- Subjective


Subjective: 


calm adn cooeprative. no distres


no compalints


no f/c, n/v/d


epndign placement





Objective





- Vital Signs/Intake and Output


Vital Signs (last 24 hours): 


 











Temp Pulse Resp BP Pulse Ox


 


 97.7 F   71   19   101/61   99 


 


 09/20/16 00:00  09/20/16 00:00  09/20/16 00:00  09/20/16 00:00  09/20/16 00:00











- Medications


Medications: 


 Current Medications





Aspirin (Ecotrin)  81 mg PO DAILY [facility]


   Last Admin: 09/19/16 09:14 Dose:  81 mg


Divalproex Sodium (Depakote Dr(*Bid*))  500 mg PO BID [facility]


   Last Admin: 09/19/16 16:41 Dose:  500 mg


Enalapril Maleate (Vasotec)  10 mg PO DAILY [facility]


   Last Admin: 09/19/16 09:16 Dose:  10 mg


Famotidine (Pepcid)  20 mg PO BID [facility]


   Last Admin: 09/19/16 16:41 Dose:  20 mg


Ibuprofen (Motrin Tab)  600 mg PO Q8 PRN


   PRN Reason: Pain, moderate (4-7)


   Last Admin: 09/18/16 08:56 Dose:  600 mg


Metoprolol Tartrate (Lopressor)  50 mg PO Q12 [facility]


   Last Admin: 09/19/16 21:35 Dose:  50 mg


Nicotine (Nicoderm Cq)  1 patch TD DAILY [facility]


   Last Admin: 09/19/16 09:15 Dose:  1 patch


Nitroglycerin (Nitrostat Sl Tab)  0.4 mg SL Q5M PRN


   PRN Reason: cp


   Last Admin: 08/02/16 07:37 Dose:  0.4 mg


Quetiapine Fumarate (Seroquel)  25 mg PO HS [facility]


   Last Admin: 09/19/16 21:37 Dose:  25 mg











- Labs


Labs: 


 





 08/02/16 08:10 





 08/02/16 08:10 





 











PT  11.2 SECONDS (9.4-12.0)   12/14/15  05:20    


 


INR  1.05  (0.89-1.20)   12/14/15  05:20    


 


APTT  36.2 SECONDS (23.1-32.0)  H  12/14/15  05:20    














Assessment and Plan


(1) DVT prophylaxis


Assessment & Plan: 


scd and ae hose


ambulation


Status: Chronic





(2) CAD (coronary artery disease)


Assessment & Plan: 


sp cardiac arrest


cont meds


Status: Chronic





(3) Smoking


Assessment & Plan: 


nicoderm


Status: Chronic





(4) Low back pain


Assessment & Plan: 


ibuprofen prn


Status: Chronic





(5) Scrotal edema


Status: Chronic





(6) Prediabetes


Assessment & Plan: 


dietary control


Status: Chronic





(7) Neurocognitive disorder


Assessment & Plan: 


cont meds


penign placement


Status: Chronic

## 2016-09-21 RX ADMIN — DIVALPROEX SODIUM SCH MG: 250 TABLET, DELAYED RELEASE ORAL at 17:31

## 2016-09-21 RX ADMIN — DIVALPROEX SODIUM SCH MG: 250 TABLET, DELAYED RELEASE ORAL at 09:28

## 2016-09-21 NOTE — PN
DATE: 09/21/2016



The patient evaluated in bed.  In no distress.  No complaints.  No fever, chills, nausea, vomiting or
 diarrhea.  The patient remains pending placement, has a guardian in place.  Remains unable to make d
ecisions for himself.



REVIEW OF SYSTEMS:  Negative.



PHYSICAL EXAMINATION:

GENERAL:  Alert, oriented to his baseline.

HEART:  Regular rate and rhythm.  No murmurs, rubs, or gallops.

LUNGS:  Clear in all fields bilaterally.

ABDOMEN:  Soft, nontender.  Bowel sounds x 4.

EXTREMITIES:  Distal pulses, motor and sensation intact.  Cap refill is brisk.



DIAGNOSES AND PLAN:

1.  Coronary artery disease, status post cardiac arrest.  Continue all medications.

2.  Neurocognitive disorder.  Continue all medications.  Has guardian, pending placement, though requ
ires a guardian for his care.

3.  Deep venous thrombosis prophylaxis.  SCDs and AE hose, ambulation.

4.  Intermittent low back pain.  Ibuprofen p.r.n.

5.  Smoking cessation.  Nicoderm patch.  



Will continue to monitor the patient throughout placement.





__________________________________________

Fan SIVLER







cc:



DD: 09/21/2016 07:50:25  [id number]

TT: 09/21/2016 08:24:48

Confirmation # [id number]

Dictation # [id number]

mn

## 2016-09-21 NOTE — CP.PCM.PN
Subjective





- Date & Time of Evaluation


Date of Evaluation: 09/21/16


Time of Evaluation: 06:53





- Subjective


Subjective: 


dictated note [id number]


no f/c, n/v/d


pending placement





Objective





- Vital Signs/Intake and Output


Vital Signs (last 24 hours): 


 











Temp Pulse Resp BP Pulse Ox


 


 97.7 F   67   19   92/58 L  97 


 


 09/21/16 00:07  09/21/16 00:07  09/21/16 00:07  09/21/16 00:07  09/21/16 00:07











- Medications


Medications: 


 Current Medications





Aspirin (Ecotrin)  81 mg PO DAILY [facility]


   Last Admin: 09/20/16 09:38 Dose:  81 mg


Divalproex Sodium (Depakote Dr(*Bid*))  500 mg PO BID [facility]


   Last Admin: 09/20/16 18:45 Dose:  500 mg


Enalapril Maleate (Vasotec)  10 mg PO DAILY [facility]


   Last Admin: 09/20/16 09:38 Dose:  10 mg


Famotidine (Pepcid)  20 mg PO BID [facility]


   Last Admin: 09/20/16 18:45 Dose:  20 mg


Ibuprofen (Motrin Tab)  600 mg PO Q8 PRN


   PRN Reason: Pain, moderate (4-7)


   Last Admin: 09/18/16 08:56 Dose:  600 mg


Metoprolol Tartrate (Lopressor)  50 mg PO Q12 [facility]


   Last Admin: 09/20/16 20:52 Dose:  50 mg


Nicotine (Nicoderm Cq)  1 patch TD DAILY [facility]


   Last Admin: 09/20/16 09:37 Dose:  1 patch


Nitroglycerin (Nitrostat Sl Tab)  0.4 mg SL Q5M PRN


   PRN Reason: cp


   Last Admin: 08/02/16 07:37 Dose:  0.4 mg


Quetiapine Fumarate (Seroquel)  25 mg PO HS [facility]


   Last Admin: 09/20/16 22:07 Dose:  25 mg











- Labs


Labs: 


 





 08/02/16 08:10 





 08/02/16 08:10 





 











PT  11.2 SECONDS (9.4-12.0)   12/14/15  05:20    


 


INR  1.05  (0.89-1.20)   12/14/15  05:20    


 


APTT  36.2 SECONDS (23.1-32.0)  H  12/14/15  05:20    














Assessment and Plan


(1) DVT prophylaxis


Status: Chronic





(2) CAD (coronary artery disease)


Status: Chronic





(3) Smoking


Status: Chronic





(4) Low back pain


Status: Chronic





(5) Scrotal edema


Status: Chronic





(6) Prediabetes


Status: Chronic





(7) Neurocognitive disorder


Status: Chronic

## 2016-09-22 RX ADMIN — DIVALPROEX SODIUM SCH MG: 250 TABLET, DELAYED RELEASE ORAL at 18:25

## 2016-09-22 RX ADMIN — DIVALPROEX SODIUM SCH MG: 250 TABLET, DELAYED RELEASE ORAL at 10:17

## 2016-09-22 NOTE — CP.PCM.PN
Subjective





- Date & Time of Evaluation


Date of Evaluation: 09/22/16


Time of Evaluation: 07:06





- Subjective


Subjective: 


no f/c, n/v/d


viktor whitaker cooperatistefan


epending palcement, has guardian





Objective





- Vital Signs/Intake and Output


Vital Signs (last 24 hours): 


 











Temp Pulse Resp BP Pulse Ox


 


 97.7 F   78   18   94/61 L  98 


 


 09/22/16 00:15  09/22/16 00:15  09/22/16 00:15  09/22/16 00:15  09/22/16 00:15











- Medications


Medications: 


 Current Medications





Aspirin (Ecotrin)  81 mg PO DAILY [facility]


   Last Admin: 09/21/16 09:28 Dose:  81 mg


Divalproex Sodium (Depakote Dr(*Bid*))  500 mg PO BID [facility]


   Last Admin: 09/21/16 17:31 Dose:  500 mg


Enalapril Maleate (Vasotec)  10 mg PO DAILY [facility]


   Last Admin: 09/21/16 09:28 Dose:  10 mg


Famotidine (Pepcid)  20 mg PO BID [facility]


   Last Admin: 09/21/16 17:31 Dose:  20 mg


Ibuprofen (Motrin Tab)  600 mg PO Q8 PRN


   PRN Reason: Pain, moderate (4-7)


   Last Admin: 09/18/16 08:56 Dose:  600 mg


Metoprolol Tartrate (Lopressor)  50 mg PO Q12 [facility]


   Last Admin: 09/21/16 22:08 Dose:  50 mg


Nicotine (Nicoderm Cq)  1 patch TD DAILY [facility]


   Last Admin: 09/21/16 10:39 Dose:  1 patch


Nitroglycerin (Nitrostat Sl Tab)  0.4 mg SL Q5M PRN


   PRN Reason: cp


   Last Admin: 08/02/16 07:37 Dose:  0.4 mg


Quetiapine Fumarate (Seroquel)  25 mg PO HS [facility]


   Last Admin: 09/21/16 22:09 Dose:  25 mg











- Labs


Labs: 


 





 08/02/16 08:10 





 08/02/16 08:10 





 











PT  11.2 SECONDS (9.4-12.0)   12/14/15  05:20    


 


INR  1.05  (0.89-1.20)   12/14/15  05:20    


 


APTT  36.2 SECONDS (23.1-32.0)  H  12/14/15  05:20    














- Constitutional


Appears: Well, Non-toxic, No Acute Distress





- Head Exam


Head Exam: ATRAUMATIC, NORMAL INSPECTION, NORMOCEPHALIC





- Eye Exam


Eye Exam: EOMI, Normal appearance, PERRL


Pupil Exam: NORMAL ACCOMODATION, PERRL





- ENT Exam


ENT Exam: Mucous Membranes Moist, Normal Exam





- Neck Exam


Neck Exam: Full ROM, Normal Inspection.  absent: Lymphadenopathy





- Respiratory Exam


Respiratory Exam: Clear to Ausculation Bilateral, NORMAL BREATHING PATTERN





- Cardiovascular Exam


Cardiovascular Exam: REGULAR RHYTHM, +S1, +S2.  absent: Murmur





- GI/Abdominal Exam


GI & Abdominal Exam: Soft, Normal Bowel Sounds.  absent: Tenderness





- Rectal Exam


Rectal Exam: NORMAL INSPECTION





-  Exam


 Exam: Circumcision, NORMAL INSPECTION


External exam: NORMAL EXTERNAL EXAM


Speculum exam: NORMAL SPECULUM EXAM


Bimanual exam: NORMAL BIMANUAL EXAM





- Extremities Exam


Extremities Exam: Full ROM, Normal Capillary Refill, Normal Inspection.  absent

: Joint Swelling, Pedal Edema





- Back Exam


Back Exam: NORMAL INSPECTION





- Neurological Exam


Neurological Exam: Alert, Awake, CN II-XII Intact, Normal Gait, Oriented x3





- Psychiatric Exam


Psychiatric exam: Normal Affect, Normal Mood





- Skin


Skin Exam: Dry, Intact, Normal Color, Warm





Assessment and Plan


(1) DVT prophylaxis


Assessment & Plan: 


scd and ae hose


ambulation


Status: Chronic





(2) CAD (coronary artery disease)


Assessment & Plan: 


cont meds


Status: Chronic





(3) Smoking


Assessment & Plan: 


nicoderm


Status: Chronic





(4) Low back pain


Assessment & Plan: 


ibuprofen prn


Status: Chronic





(5) Scrotal edema


Status: Chronic





(6) Prediabetes


Assessment & Plan: 


dietary


Status: Chronic





(7) Neurocognitive disorder


Assessment & Plan: 


cont eds


epndign placement


has guardian


Status: Chronic

## 2016-09-23 RX ADMIN — DIVALPROEX SODIUM SCH MG: 250 TABLET, DELAYED RELEASE ORAL at 08:50

## 2016-09-23 RX ADMIN — DIVALPROEX SODIUM SCH MG: 250 TABLET, DELAYED RELEASE ORAL at 16:27

## 2016-09-23 NOTE — CP.PCM.PN
Subjective





- Date & Time of Evaluation


Date of Evaluation: 09/23/16


Time of Evaluation: 10:12





- Subjective


Subjective: 


no complaints/distress


no f/c, n/v/d


pending placement.





Objective





- Vital Signs/Intake and Output


Vital Signs (last 24 hours): 


 











Temp Pulse Resp BP Pulse Ox


 


 97.1 F L  65   18   105/60   98 


 


 09/23/16 09:01  09/23/16 09:01  09/23/16 09:01  09/23/16 09:01  09/23/16 09:01











- Medications


Medications: 


 Current Medications





Aspirin (Ecotrin)  81 mg PO DAILY [facility]


   Last Admin: 09/23/16 08:50 Dose:  81 mg


Divalproex Sodium (Depakote Dr(*Bid*))  500 mg PO BID [facility]


   Last Admin: 09/23/16 08:50 Dose:  500 mg


Enalapril Maleate (Vasotec)  10 mg PO DAILY [facility]


   Last Admin: 09/23/16 08:52 Dose:  10 mg


Famotidine (Pepcid)  20 mg PO BID [facility]


   Last Admin: 09/23/16 08:51 Dose:  20 mg


Ibuprofen (Motrin Tab)  600 mg PO Q8 PRN


   PRN Reason: Pain, moderate (4-7)


   Last Admin: 09/18/16 08:56 Dose:  600 mg


Metoprolol Tartrate (Lopressor)  50 mg PO Q12 [facility]


   Last Admin: 09/23/16 08:50 Dose:  50 mg


Nicotine (Nicoderm Cq)  1 patch TD DAILY [facility]


   Last Admin: 09/23/16 08:50 Dose:  1 patch


Nitroglycerin (Nitrostat Sl Tab)  0.4 mg SL Q5M PRN


   PRN Reason: cp


   Last Admin: 08/02/16 07:37 Dose:  0.4 mg


Quetiapine Fumarate (Seroquel)  25 mg PO HS [facility]


   Last Admin: 09/22/16 23:00 Dose:  25 mg











- Labs


Labs: 


 





 08/02/16 08:10 





 08/02/16 08:10 





 











PT  11.2 SECONDS (9.4-12.0)   12/14/15  05:20    


 


INR  1.05  (0.89-1.20)   12/14/15  05:20    


 


APTT  36.2 SECONDS (23.1-32.0)  H  12/14/15  05:20    














- Constitutional


Appears: Well, Non-toxic, No Acute Distress





- Head Exam


Head Exam: ATRAUMATIC, NORMAL INSPECTION, NORMOCEPHALIC





- Eye Exam


Eye Exam: EOMI, Normal appearance, PERRL


Pupil Exam: NORMAL ACCOMODATION, PERRL





- ENT Exam


ENT Exam: Mucous Membranes Moist, Normal Exam





- Neck Exam


Neck Exam: Full ROM, Normal Inspection.  absent: Lymphadenopathy





- Respiratory Exam


Respiratory Exam: Clear to Ausculation Bilateral, NORMAL BREATHING PATTERN





- Cardiovascular Exam


Cardiovascular Exam: REGULAR RHYTHM, RRR, +S1, +S2.  absent: Murmur





- GI/Abdominal Exam


GI & Abdominal Exam: Soft, Normal Bowel Sounds.  absent: Tenderness





- Extremities Exam


Extremities Exam: Full ROM, Normal Capillary Refill, Normal Inspection.  absent

: Joint Swelling, Pedal Edema





- Back Exam


Back Exam: NORMAL INSPECTION





- Neurological Exam


Neurological Exam: Alert, Awake, CN II-XII Intact, Normal Gait, Oriented x3





- Psychiatric Exam


Psychiatric exam: Normal Affect, Normal Mood





- Skin


Skin Exam: Dry, Intact, Normal Color, Warm





Assessment and Plan


(1) DVT prophylaxis


Status: Chronic





(2) CAD (coronary artery disease)


Status: Chronic





(3) Smoking


Status: Chronic





(4) Low back pain


Status: Chronic





(5) Scrotal edema


Status: Chronic





(6) Prediabetes


Status: Chronic





(7) Neurocognitive disorder


Status: Chronic








- Assessment and Plan (Free Text)


Assessment: 


(1) DVT prophylaxis


Assessment & Plan: 


scd and ae hose


ambulation


Status: Chronic





(2) CAD (coronary artery disease)


Assessment & Plan: 


cont meds


Status: Chronic





(3) Smoking


Assessment & Plan: 


nicoderm


Status: Chronic





(4) Low back pain


Assessment & Plan: 


ibuprofen prn


Status: Chronic





(5) Scrotal edema


Status: Chronic





(6) Prediabetes


Assessment & Plan: 


dietary


Status: Chronic





(7) Neurocognitive disorder


Assessment & Plan: 


cont eds


epndign placement


has guardian


Status: Chronic

## 2016-09-24 RX ADMIN — DIVALPROEX SODIUM SCH MG: 250 TABLET, DELAYED RELEASE ORAL at 16:22

## 2016-09-24 RX ADMIN — DIVALPROEX SODIUM SCH MG: 250 TABLET, DELAYED RELEASE ORAL at 09:16

## 2016-09-24 NOTE — CP.PCM.PN
Subjective





- Date & Time of Evaluation


Date of Evaluation: 09/24/16


Time of Evaluation: 06:49





- Subjective


Subjective: 


no f/c, nvd


no distress/complaints


pendign placement


ros negative





Objective





- Vital Signs/Intake and Output


Vital Signs (last 24 hours): 


 











Temp Pulse Resp BP Pulse Ox


 


 97.9 F   71   16   98/69 L  99 


 


 09/23/16 21:40  09/23/16 21:46  09/23/16 21:40  09/23/16 21:46  09/23/16 21:40











- Medications


Medications: 


 Current Medications





Aspirin (Ecotrin)  [facility] mg PO [facility]ILY [facility]


   Last Admin: 09/23/16 08:50 Dose:  81 mg


Divalproex Sodium (Depakote Dr(*Bid*))  500 mg PO BID [facility]


   Last Admin: 09/23/16 16:27 Dose:  500 mg


Enalapril Maleate (Vasotec)  10 mg PO DAILY [facility]


   Last Admin: 09/23/16 08:52 Dose:  10 mg


Famotidine (Pepcid)  20 mg PO BID [facility]


   Last Admin: 09/23/16 16:27 Dose:  20 mg


Ibuprofen (Motrin Tab)  600 mg PO Q8 PRN


   PRN Reason: Pain, moderate (4-7)


   Last Admin: 09/18/16 08:56 Dose:  600 mg


Metoprolol Tartrate (Lopressor)  50 mg PO Q12 [facility]


   Last Admin: 09/23/16 21:46 Dose:  Not Given


Nicotine (Nicoderm Cq)  1 patch TD DAILY [facility]


   Last Admin: 09/23/16 08:50 Dose:  1 patch


Nitroglycerin (Nitrostat Sl Tab)  0.4 mg SL Q5M PRN


   PRN Reason: cp


   Last Admin: 08/02/16 07:37 Dose:  0.4 mg


Quetiapine Fumarate (Seroquel)  25 mg PO HS [facility]


   Last Admin: 09/23/16 21:47 Dose:  25 mg











- Labs


Labs: 


 





 08/02/16 08:10 





 08/02/16 08:10 





 











PT  11.2 SECONDS (9.4-12.0)   12/14/15  05:20    


 


INR  1.05  (0.89-1.20)   12/14/15  05:20    


 


APTT  36.2 SECONDS (23.1-32.0)  H  12/14/15  05:20    














- Constitutional


Appears: Well, Non-toxic, No Acute Distress





- Head Exam


Head Exam: ATRAUMATIC, NORMAL INSPECTION, NORMOCEPHALIC





- Eye Exam


Eye Exam: EOMI, Normal appearance, PERRL


Pupil Exam: NORMAL ACCOMODATION, PERRL





- ENT Exam


ENT Exam: Mucous Membranes Moist, Normal Exam





- Neck Exam


Neck Exam: Full ROM, Normal Inspection.  absent: Lymphadenopathy





- Respiratory Exam


Respiratory Exam: Clear to Ausculation Bilateral, NORMAL BREATHING PATTERN





- Cardiovascular Exam


Cardiovascular Exam: REGULAR RHYTHM, +S1, +S2.  absent: Murmur





- GI/Abdominal Exam


GI & Abdominal Exam: Soft, Normal Bowel Sounds.  absent: Tenderness





- Extremities Exam


Extremities Exam: Full ROM, Normal Capillary Refill, Normal Inspection.  absent

: Joint Swelling, Pedal Edema





- Back Exam


Back Exam: NORMAL INSPECTION





- Neurological Exam


Neurological Exam: Alert, Awake, CN II-XII Intact, Normal Gait, Oriented x3





- Psychiatric Exam


Psychiatric exam: Normal Affect, Normal Mood





- Skin


Skin Exam: Dry, Intact, Normal Color, Warm





Assessment and Plan


(1) DVT prophylaxis


Assessment & Plan: 


scd jazmín e hose


ambulation


Status: Chronic





(2) CAD (coronary artery disease)


Assessment & Plan: 


cont meds


Status: Chronic





(3) Smoking


Assessment & Plan: 


nicoderm


Status: Chronic





(4) Low back pain


Assessment & Plan: 


ibuprofen prn


Status: Chronic





(5) Scrotal edema


Status: Chronic





(6) Prediabetes


Assessment & Plan: 


dietary


Status: Chronic





(7) Neurocognitive disorder


Assessment & Plan: 


cont meds


pending placement


Status: Chronic

## 2016-09-25 RX ADMIN — DIVALPROEX SODIUM SCH MG: 250 TABLET, DELAYED RELEASE ORAL at 16:54

## 2016-09-25 RX ADMIN — DIVALPROEX SODIUM SCH MG: 250 TABLET, DELAYED RELEASE ORAL at 09:08

## 2016-09-25 NOTE — CP.PCM.PN
Subjective





- Date & Time of Evaluation


Date of Evaluation: 09/25/16


Time of Evaluation: 12:32





- Subjective


Subjective: 


calm and cooperative


no distress


no compalints


no f/c, n/v/d


pending placement





Objective





- Vital Signs/Intake and Output


Vital Signs (last 24 hours): 


 











Temp Pulse Resp BP Pulse Ox


 


 97.5 F L  66   18   113/68   98 


 


 09/25/16 09:30  09/25/16 09:30  09/25/16 09:30  09/25/16 09:30  09/25/16 09:30











- Medications


Medications: 


 Current Medications





Aspirin (Ecotrin)  81 mg PO DAILY [facility]


   Last Admin: 09/25/16 09:09 Dose:  81 mg


Divalproex Sodium (Depakote Dr(*Bid*))  500 mg PO BID [facility]


   Last Admin: 09/25/16 09:08 Dose:  500 mg


Enalapril Maleate (Vasotec)  10 mg PO DAILY [facility]


   Last Admin: 09/25/16 09:09 Dose:  10 mg


Famotidine (Pepcid)  20 mg PO BID [facility]


   Last Admin: 09/25/16 09:09 Dose:  20 mg


Ibuprofen (Motrin Tab)  600 mg PO Q8 PRN


   PRN Reason: Pain, moderate (4-7)


   Last Admin: 09/18/16 08:56 Dose:  600 mg


Metoprolol Tartrate (Lopressor)  50 mg PO Q12 [facility]


   Last Admin: 09/25/16 09:09 Dose:  50 mg


Nicotine (Nicoderm Cq)  1 patch TD DAILY [facility]


   Last Admin: 09/25/16 09:08 Dose:  1 patch


Quetiapine Fumarate (Seroquel)  25 mg PO HS [facility]


   Last Admin: 09/24/16 21:44 Dose:  25 mg











- Labs


Labs: 


 





 08/02/16 08:10 





 08/02/16 08:10 





 











PT  11.2 SECONDS (9.4-12.0)   12/14/15  05:20    


 


INR  1.05  (0.89-1.20)   12/14/15  05:20    


 


APTT  36.2 SECONDS (23.1-32.0)  H  12/14/15  05:20    














- Constitutional


Appears: Well, Non-toxic, No Acute Distress





- Head Exam


Head Exam: ATRAUMATIC, NORMAL INSPECTION, NORMOCEPHALIC





- Eye Exam


Eye Exam: EOMI, Normal appearance, PERRL


Pupil Exam: NORMAL ACCOMODATION, PERRL





- ENT Exam


ENT Exam: Normal Exam





- Neck Exam


Neck Exam: absent: Lymphadenopathy





- Respiratory Exam


Respiratory Exam: Clear to Ausculation Bilateral, NORMAL BREATHING PATTERN





- Cardiovascular Exam


Cardiovascular Exam: REGULAR RHYTHM, RRR, +S1, +S2.  absent: Murmur





- GI/Abdominal Exam


GI & Abdominal Exam: Soft, Normal Bowel Sounds.  absent: Tenderness





- Extremities Exam


Extremities Exam: Full ROM, Normal Capillary Refill, Normal Inspection.  absent

: Joint Swelling, Pedal Edema





- Back Exam


Back Exam: NORMAL INSPECTION





- Neurological Exam


Neurological Exam: Alert, Awake, CN II-XII Intact, Normal Gait, Oriented x3





- Psychiatric Exam


Psychiatric exam: Normal Affect, Normal Mood





- Skin


Skin Exam: Dry, Intact, Normal Color, Warm





Assessment and Plan


(1) DVT prophylaxis


Status: Chronic





(2) CAD (coronary artery disease)


Status: Chronic





(3) Smoking


Status: Chronic





(4) Low back pain


Status: Chronic





(5) Scrotal edema


Status: Chronic





(6) Prediabetes


Status: Chronic





(7) Neurocognitive disorder


Status: Chronic








- Assessment and Plan (Free Text)


Assessment: 


(1) DVT prophylaxis


Assessment & Plan: 


scd and ae hose


ambulation


Status: Chronic





(2) CAD (coronary artery disease)


Assessment & Plan: 


sp cardiac arrest


cont meds


Status: Chronic





(3) Smoking


Assessment & Plan: 


nicoderm


Status: Chronic





(4) Low back pain


Assessment & Plan: 


ibuprofen prn


Status: Chronic





(5) Scrotal edema


Status: Chronic





(6) Prediabetes


Assessment & Plan: 


dietary control


Status: Chronic





(7) Neurocognitive disorder


Assessment & Plan: 


cont meds


penign placement


Status: Chronic

## 2016-09-26 RX ADMIN — DIVALPROEX SODIUM SCH MG: 250 TABLET, DELAYED RELEASE ORAL at 16:38

## 2016-09-26 RX ADMIN — DIVALPROEX SODIUM SCH MG: 250 TABLET, DELAYED RELEASE ORAL at 09:40

## 2016-09-27 RX ADMIN — DIVALPROEX SODIUM SCH MG: 250 TABLET, DELAYED RELEASE ORAL at 16:15

## 2016-09-27 RX ADMIN — DIVALPROEX SODIUM SCH MG: 250 TABLET, DELAYED RELEASE ORAL at 10:46

## 2016-09-27 NOTE — CP.PCM.PN
Subjective





- Date & Time of Evaluation


Date of Evaluation: 09/27/16


Time of Evaluation: 07:00





- Subjective


Subjective: 


pendign placement


no f/c, n/v/d


no distress/complaints





Objective





- Vital Signs/Intake and Output


Vital Signs (last 24 hours): 


 











Temp Pulse Resp BP Pulse Ox


 


 97.9 F   58 L  18   116/71   97 


 


 09/27/16 00:00  09/27/16 00:00  09/27/16 00:00  09/27/16 00:00  09/27/16 00:00











- Medications


Medications: 


 Current Medications





Aspirin (Ecotrin)  81 mg PO DAILY [facility]


   Last Admin: 09/26/16 09:41 Dose:  81 mg


Divalproex Sodium (Depakote Dr(*Bid*))  500 mg PO BID [facility]


   Last Admin: 09/26/16 16:38 Dose:  500 mg


Enalapril Maleate (Vasotec)  10 mg PO DAILY [facility]


   Last Admin: 09/26/16 09:40 Dose:  10 mg


Famotidine (Pepcid)  20 mg PO BID [facility]


   Last Admin: 09/26/16 16:39 Dose:  20 mg


Ibuprofen (Motrin Tab)  600 mg PO Q8 PRN


   PRN Reason: Pain, moderate (4-7)


   Last Admin: 09/18/16 08:56 Dose:  600 mg


Metoprolol Tartrate (Lopressor)  50 mg PO Q12 [facility]


   Last Admin: 09/26/16 21:52 Dose:  50 mg


Nicotine (Nicoderm Cq)  1 patch TD DAILY [facility]


   Last Admin: 09/26/16 09:42 Dose:  1 patch


Quetiapine Fumarate (Seroquel)  25 mg PO HS [facility]


   Last Admin: 09/26/16 21:53 Dose:  25 mg











- Labs


Labs: 


 





 08/02/16 08:10 





 08/02/16 08:10 





 











PT  11.2 SECONDS (9.4-12.0)   12/14/15  05:20    


 


INR  1.05  (0.89-1.20)   12/14/15  05:20    


 


APTT  36.2 SECONDS (23.1-32.0)  H  12/14/15  05:20    














- Constitutional


Appears: Well, Non-toxic, No Acute Distress





- Head Exam


Head Exam: ATRAUMATIC, NORMAL INSPECTION, NORMOCEPHALIC





- Eye Exam


Eye Exam: EOMI, Normal appearance, PERRL


Pupil Exam: NORMAL ACCOMODATION, PERRL





- ENT Exam


ENT Exam: Mucous Membranes Moist, Normal Exam





- Neck Exam


Neck Exam: Full ROM, Normal Inspection.  absent: Lymphadenopathy





- Respiratory Exam


Respiratory Exam: Clear to Ausculation Bilateral, NORMAL BREATHING PATTERN





- Cardiovascular Exam


Cardiovascular Exam: REGULAR RHYTHM, +S1, +S2.  absent: Murmur





- GI/Abdominal Exam


GI & Abdominal Exam: Soft, Normal Bowel Sounds.  absent: Tenderness





- Extremities Exam


Extremities Exam: Full ROM, Normal Capillary Refill, Normal Inspection.  absent

: Joint Swelling, Pedal Edema





- Back Exam


Back Exam: NORMAL INSPECTION





- Neurological Exam


Neurological Exam: Alert, Awake, CN II-XII Intact, Normal Gait, Oriented x3





- Psychiatric Exam


Psychiatric exam: Normal Affect, Normal Mood





- Skin


Skin Exam: Dry, Intact, Normal Color, Warm





Assessment and Plan


(1) DVT prophylaxis


Status: Chronic





(2) CAD (coronary artery disease)


Status: Chronic





(3) Smoking


Status: Chronic





(4) Low back pain


Status: Chronic





(5) Scrotal edema


Status: Chronic





(6) Prediabetes


Status: Chronic





(7) Neurocognitive disorder


Status: Chronic








- Assessment and Plan (Free Text)


Assessment: 


(1) DVT prophylaxis


Assessment & Plan: 


scd jazmín e hose


ambulation


Status: Chronic





(2) CAD (coronary artery disease)


Assessment & Plan: 


cont meds


Status: Chronic





(3) Smoking


Assessment & Plan: 


nicoderm


Status: Chronic





(4) Low back pain


Assessment & Plan: 


ibuprofen prn


Status: Chronic





(5) Scrotal edema


Status: Chronic





(6) Prediabetes


Assessment & Plan: 


dietary


Status: Chronic





(7) Neurocognitive disorder


Assessment & Plan: 


cont meds


pending placement


Status: Chronic

## 2016-09-28 RX ADMIN — DIVALPROEX SODIUM SCH MG: 250 TABLET, DELAYED RELEASE ORAL at 16:17

## 2016-09-28 RX ADMIN — DIVALPROEX SODIUM SCH MG: 250 TABLET, DELAYED RELEASE ORAL at 10:40

## 2016-09-28 NOTE — PN
DATE: 09/28/2016



The patient evaluated in bed.  No complaints.  In no distress.  No fevers, chills, nausea, vomiting, 
or diarrhea.  The patient remains pending placement.



REVIEW OF SYSTEMS:  Negative.



PHYSICAL EXAMINATION:

GENERAL:  Alert and oriented at his baseline.

HEART:  Regular rate and rhythm.  No murmurs, rubs, or gallops.

LUNGS:  Clear in all fields bilaterally.

ABDOMEN:  Soft, nontender.  Bowel sounds x 4.

EXTREMITIES:  Distal pulses, motor and sensation intact.  Cap refill is brisk.



DIAGNOSES AND PLAN:  

1.  Coronary artery disease, status post cardiac arrest.  Continue medications.  

2.  Neurocognitive disorder.  Continue medications.  Pending placement.

3.  Deep venous thrombosis prophylaxis.  SCDs and AE hose, ambulation.  

4.  Intermittent low back pain.  Ibuprofen p.r.n.

5.  Nicoderm for smoking cessation.  

6.  We will continue to monitor patient closely.  Will discharge when stable.





__________________________________________

Fan SILVER







cc:



DD: 09/28/2016 07:50:34  [id number]

TT: 09/28/2016 08:52:30

Confirmation # [id number]

Dictation # [id number]

mn

## 2016-09-28 NOTE — CP.PCM.PN
Subjective





- Date & Time of Evaluation


Date of Evaluation: 09/28/16


Time of Evaluation: 07:05





- Subjective


Subjective: 


dictated note [id number]


no distres


sepndign placement





Objective





- Vital Signs/Intake and Output


Vital Signs (last 24 hours): 


 











Temp Pulse Resp BP Pulse Ox


 


 97.8 F   94 H  18   114/77   95 


 


 09/27/16 16:25  09/27/16 21:02  09/27/16 16:25  09/27/16 21:02  09/27/16 16:25











- Medications


Medications: 


 Current Medications





Aspirin (Ecotrin)  81 mg PO DAILY [facility]


   Last Admin: 09/27/16 08:18 Dose:  81 mg


Divalproex Sodium (Depakote Dr(*Bid*))  500 mg PO BID [facility]


   Last Admin: 09/27/16 16:15 Dose:  500 mg


Enalapril Maleate (Vasotec)  10 mg PO DAILY [facility]


   Last Admin: 09/27/16 08:18 Dose:  10 mg


Famotidine (Pepcid)  20 mg PO BID [facility]


   Last Admin: 09/27/16 16:15 Dose:  20 mg


Ibuprofen (Motrin Tab)  600 mg PO Q8 PRN


   PRN Reason: Pain, moderate (4-7)


   Last Admin: 09/18/16 08:56 Dose:  600 mg


Metoprolol Tartrate (Lopressor)  50 mg PO Q12 [facility]


   Last Admin: 09/27/16 21:02 Dose:  50 mg


Nicotine (Nicoderm Cq)  1 patch TD DAILY [facility]


   Last Admin: 09/27/16 08:18 Dose:  1 patch


Quetiapine Fumarate (Seroquel)  25 mg PO HS [facility]


   Last Admin: 09/27/16 21:02 Dose:  25 mg











- Labs


Labs: 


 





 08/02/16 08:10 





 08/02/16 08:10 





 











PT  11.2 SECONDS (9.4-12.0)   12/14/15  05:20    


 


INR  1.05  (0.89-1.20)   12/14/15  05:20    


 


APTT  36.2 SECONDS (23.1-32.0)  H  12/14/15  05:20    














Assessment and Plan


(1) DVT prophylaxis


Status: Chronic





(2) CAD (coronary artery disease)


Status: Chronic





(3) Smoking


Status: Chronic





(4) Low back pain


Status: Chronic





(5) Scrotal edema


Status: Chronic





(6) Prediabetes


Status: Chronic





(7) Neurocognitive disorder


Status: Chronic

## 2016-09-29 RX ADMIN — DIVALPROEX SODIUM SCH MG: 250 TABLET, DELAYED RELEASE ORAL at 08:54

## 2016-09-29 RX ADMIN — DIVALPROEX SODIUM SCH MG: 250 TABLET, DELAYED RELEASE ORAL at 16:11

## 2016-09-29 NOTE — CP.PCM.PN
Subjective





- Date & Time of Evaluation


Date of Evaluation: 09/29/16


Time of Evaluation: 07:04





- Subjective


Subjective: 


njo f/c, nv//d


pending placement


no compalints/distress





Objective





- Vital Signs/Intake and Output


Vital Signs (last 24 hours): 


 











Temp Pulse Resp BP Pulse Ox


 


 97.7 F   72   20   125/86   97 


 


 09/28/16 16:55  09/28/16 21:37  09/28/16 16:55  09/28/16 21:37  09/28/16 16:55











- Medications


Medications: 


 Current Medications





Aspirin (Ecotrin)  81 mg PO DAILY [facility]


   Last Admin: 09/28/16 10:40 Dose:  81 mg


Divalproex Sodium (Depakote Dr(*Bid*))  500 mg PO BID [facility]


   Last Admin: 09/28/16 16:17 Dose:  500 mg


Enalapril Maleate (Vasotec)  10 mg PO DAILY [facility]


   Last Admin: 09/28/16 10:40 Dose:  10 mg


Famotidine (Pepcid)  20 mg PO BID [facility]


   Last Admin: 09/28/16 16:17 Dose:  20 mg


Ibuprofen (Motrin Tab)  600 mg PO Q8 PRN


   PRN Reason: Pain, moderate (4-7)


   Last Admin: 09/18/16 08:56 Dose:  600 mg


Metoprolol Tartrate (Lopressor)  50 mg PO Q12 [facility]


   Last Admin: 09/28/16 21:37 Dose:  50 mg


Nicotine (Nicoderm Cq)  1 patch TD DAILY [facility]


   Last Admin: 09/28/16 10:39 Dose:  1 patch


Quetiapine Fumarate (Seroquel)  25 mg PO HS [facility]


   Last Admin: 09/28/16 21:37 Dose:  25 mg











- Labs


Labs: 


 





 08/02/16 08:10 





 08/02/16 08:10 





 











PT  11.2 SECONDS (9.4-12.0)   12/14/15  05:20    


 


INR  1.05  (0.89-1.20)   12/14/15  05:20    


 


APTT  36.2 SECONDS (23.1-32.0)  H  12/14/15  05:20    














- Constitutional


Appears: Well, Non-toxic, No Acute Distress





- Head Exam


Head Exam: ATRAUMATIC, NORMAL INSPECTION, NORMOCEPHALIC





- Eye Exam


Eye Exam: EOMI, Normal appearance, PERRL


Pupil Exam: NORMAL ACCOMODATION, PERRL





- ENT Exam


ENT Exam: Mucous Membranes Moist, Normal Exam





- Neck Exam


Neck Exam: Full ROM, Normal Inspection.  absent: Lymphadenopathy





- Respiratory Exam


Respiratory Exam: Clear to Ausculation Bilateral, NORMAL BREATHING PATTERN





- Cardiovascular Exam


Cardiovascular Exam: REGULAR RHYTHM, +S1, +S2.  absent: Murmur





- GI/Abdominal Exam


GI & Abdominal Exam: Soft, Normal Bowel Sounds.  absent: Tenderness





- Extremities Exam


Extremities Exam: Full ROM, Normal Capillary Refill, Normal Inspection.  absent

: Joint Swelling, Pedal Edema





- Back Exam


Back Exam: NORMAL INSPECTION





- Neurological Exam


Neurological Exam: Alert, Awake, CN II-XII Intact, Normal Gait, Oriented x3





- Psychiatric Exam


Psychiatric exam: Normal Affect, Normal Mood





- Skin


Skin Exam: Dry, Intact, Normal Color, Warm





Assessment and Plan


(1) DVT prophylaxis


Status: Chronic





(2) CAD (coronary artery disease)


Status: Chronic





(3) Smoking


Status: Chronic





(4) Low back pain


Status: Chronic





(5) Scrotal edema


Status: Chronic





(6) Prediabetes


Status: Chronic





(7) Neurocognitive disorder


Status: Chronic








- Assessment and Plan (Free Text)


Assessment: 


(1) DVT prophylaxis


Assessment & Plan: 


scd and ae hose


ambulation


Status: Chronic





(2) CAD (coronary artery disease)


Assessment & Plan: 


sp cardiac arrest


cont meds


Status: Chronic





(3) Smoking


Assessment & Plan: 


nicoderm


Status: Chronic





(4) Low back pain


Assessment & Plan: 


ibuprofen prn


Status: Chronic





(5) Scrotal edema


Status: Chronic





(6) Prediabetes


Assessment & Plan: 


dietary control


Status: Chronic





(7) Neurocognitive disorder


Assessment & Plan: 


cont meds


penign placement


Status: Chronic

## 2016-09-30 RX ADMIN — DIVALPROEX SODIUM SCH MG: 250 TABLET, DELAYED RELEASE ORAL at 16:47

## 2016-09-30 RX ADMIN — DIVALPROEX SODIUM SCH MG: 250 TABLET, DELAYED RELEASE ORAL at 11:01

## 2016-09-30 NOTE — CP.PCM.PN
Subjective





- Date & Time of Evaluation


Date of Evaluation: 09/30/16


Time of Evaluation: 11:31





- Subjective


Subjective: 


pt comfortable in bed, no distress


no complaints


 no f/c, n/v/d


pending placement


ros negative.





Objective





- Vital Signs/Intake and Output


Vital Signs (last 24 hours): 


 











Temp Pulse Resp BP Pulse Ox


 


 97.5 F L  76   20   121/76   100 


 


 09/30/16 09:26  09/30/16 11:03  09/30/16 09:26  09/30/16 11:03  09/30/16 09:26











- Medications


Medications: 


 Current Medications





Aspirin (Ecotrin)  81 mg PO DAILY [facility]


   Last Admin: 09/30/16 11:03 Dose:  81 mg


Divalproex Sodium (Depakote Dr(*Bid*))  500 mg PO BID [facility]


   Last Admin: 09/30/16 11:01 Dose:  500 mg


Enalapril Maleate (Vasotec)  10 mg PO DAILY [facility]


   Last Admin: 09/30/16 11:02 Dose:  10 mg


Famotidine (Pepcid)  20 mg PO BID [facility]


   Last Admin: 09/30/16 11:01 Dose:  20 mg


Ibuprofen (Motrin Tab)  600 mg PO Q8 PRN


   PRN Reason: Pain, moderate (4-7)


   Last Admin: 09/18/16 08:56 Dose:  600 mg


Metoprolol Tartrate (Lopressor)  50 mg PO Q12 [facility]


   Last Admin: 09/30/16 11:03 Dose:  50 mg


Nicotine (Nicoderm Cq)  1 patch TD DAILY [facility]


   Last Admin: 09/30/16 11:02 Dose:  1 patch


Quetiapine Fumarate (Seroquel)  25 mg PO HS [facility]


   Last Admin: 09/29/16 21:29 Dose:  25 mg











- Labs


Labs: 


 





 08/02/16 08:10 





 08/02/16 08:10 





 











PT  11.2 SECONDS (9.4-12.0)   12/14/15  05:20    


 


INR  1.05  (0.89-1.20)   12/14/15  05:20    


 


APTT  36.2 SECONDS (23.1-32.0)  H  12/14/15  05:20    














- Constitutional


Appears: Well, Non-toxic, No Acute Distress





- Head Exam


Head Exam: ATRAUMATIC, NORMAL INSPECTION, NORMOCEPHALIC





- Eye Exam


Eye Exam: EOMI, Normal appearance, PERRL


Pupil Exam: NORMAL ACCOMODATION, PERRL





- ENT Exam


ENT Exam: Mucous Membranes Moist, Normal Exam





- Neck Exam


Neck Exam: Full ROM, Normal Inspection.  absent: Lymphadenopathy





- Respiratory Exam


Respiratory Exam: Clear to Ausculation Bilateral, NORMAL BREATHING PATTERN





- Cardiovascular Exam


Cardiovascular Exam: REGULAR RHYTHM, +S1, +S2.  absent: Murmur





- GI/Abdominal Exam


GI & Abdominal Exam: Soft, Normal Bowel Sounds.  absent: Tenderness





- Extremities Exam


Extremities Exam: Full ROM, Normal Capillary Refill, Normal Inspection.  absent

: Joint Swelling, Pedal Edema





- Back Exam


Back Exam: NORMAL INSPECTION





- Neurological Exam


Neurological Exam: Alert, Awake, CN II-XII Intact, Normal Gait, Oriented x3





- Psychiatric Exam


Psychiatric exam: Normal Affect, Normal Mood





- Skin


Skin Exam: Dry, Intact, Normal Color, Warm





Assessment and Plan


(1) DVT prophylaxis


Assessment & Plan: 


scd and ae hose


ambulation


Status: Chronic





(2) CAD (coronary artery disease)


Assessment & Plan: 


cont meds


Status: Chronic





(3) Smoking


Assessment & Plan: 


nicoderm


Status: Chronic





(4) Low back pain


Assessment & Plan: 


ibuprofen prn


Status: Chronic





(5) Scrotal edema


Status: Chronic





(6) Prediabetes


Assessment & Plan: 


dietary


Status: Chronic





(7) Neurocognitive disorder


Assessment & Plan: 


cont med


spendign placement


Status: Chronic

## 2016-10-01 RX ADMIN — DIVALPROEX SODIUM SCH MG: 250 TABLET, DELAYED RELEASE ORAL at 16:45

## 2016-10-01 RX ADMIN — DIVALPROEX SODIUM SCH MG: 250 TABLET, DELAYED RELEASE ORAL at 08:14

## 2016-10-01 NOTE — CP.PCM.PN
Subjective





- Date & Time of Evaluation


Date of Evaluation: 10/01/16


Time of Evaluation: 18:33





- Subjective


Subjective: 


no distress/complaints


no f/c, n/v/d


ros negative


pending placement





Objective





- Vital Signs/Intake and Output


Vital Signs (last 24 hours): 


 











Temp Pulse Resp BP Pulse Ox


 


 98 F   90   18   97/58 L  97 


 


 10/01/16 16:37  10/01/16 16:37  10/01/16 16:37  10/01/16 16:37  10/01/16 16:37











- Medications


Medications: 


 Current Medications





Aspirin (Ecotrin)  81 mg PO DAILY [facility]


   Last Admin: 10/01/16 08:14 Dose:  81 mg


Divalproex Sodium (Depakote Dr(*Bid*))  500 mg PO BID [facility]


   Last Admin: 10/01/16 16:45 Dose:  500 mg


Enalapril Maleate (Vasotec)  10 mg PO DAILY [facility]


   Last Admin: 10/01/16 08:15 Dose:  10 mg


Famotidine (Pepcid)  20 mg PO BID [facility]


   Last Admin: 10/01/16 16:45 Dose:  20 mg


Ibuprofen (Motrin Tab)  600 mg PO Q8 PRN


   PRN Reason: Pain, moderate (4-7)


   Last Admin: 09/18/16 08:56 Dose:  600 mg


Metoprolol Tartrate (Lopressor)  50 mg PO Q12 [facility]


   Last Admin: 10/01/16 08:14 Dose:  50 mg


Nicotine (Nicoderm Cq)  1 patch TD DAILY [facility]


   Last Admin: 10/01/16 08:13 Dose:  1 patch


Quetiapine Fumarate (Seroquel)  25 mg PO HS [facility]


   Last Admin: 09/30/16 21:32 Dose:  25 mg











- Labs


Labs: 


 





 08/02/16 08:10 





 08/02/16 08:10 





 











PT  11.2 SECONDS (9.4-12.0)   12/14/15  05:20    


 


INR  1.05  (0.89-1.20)   12/14/15  05:20    


 


APTT  36.2 SECONDS (23.1-32.0)  H  12/14/15  05:20    














- Constitutional


Appears: Well, Non-toxic, No Acute Distress





- Head Exam


Head Exam: ATRAUMATIC, NORMAL INSPECTION, NORMOCEPHALIC





- Eye Exam


Eye Exam: EOMI, Normal appearance, PERRL


Pupil Exam: NORMAL ACCOMODATION, PERRL





- ENT Exam


ENT Exam: Mucous Membranes Moist, Normal Exam





- Neck Exam


Neck Exam: Full ROM, Normal Inspection.  absent: Lymphadenopathy





- Respiratory Exam


Respiratory Exam: Clear to Ausculation Bilateral, NORMAL BREATHING PATTERN





- Cardiovascular Exam


Cardiovascular Exam: REGULAR RHYTHM, RRR, +S1, +S2.  absent: Murmur





- GI/Abdominal Exam


GI & Abdominal Exam: Soft, Normal Bowel Sounds.  absent: Tenderness





- Extremities Exam


Extremities Exam: Full ROM, Normal Capillary Refill, Normal Inspection.  absent

: Joint Swelling, Pedal Edema





- Back Exam


Back Exam: NORMAL INSPECTION





- Neurological Exam


Neurological Exam: Alert, Awake, CN II-XII Intact, Normal Gait, Oriented x3





- Psychiatric Exam


Psychiatric exam: Normal Affect, Normal Mood





- Skin


Skin Exam: Dry, Intact, Normal Color, Warm





Assessment and Plan


(1) DVT prophylaxis


Status: Chronic





(2) CAD (coronary artery disease)


Status: Chronic





(3) Smoking


Status: Chronic





(4) Low back pain


Status: Chronic





(5) Scrotal edema


Status: Chronic





(6) Prediabetes


Status: Chronic





(7) Neurocognitive disorder


Status: Chronic








- Assessment and Plan (Free Text)


Assessment: 


(1) DVT prophylaxis


Assessment & Plan: 


scd and ae hose


ambulation


Status: Chronic





(2) CAD (coronary artery disease)


Assessment & Plan: 


cont meds


Status: Chronic





(3) Smoking


Assessment & Plan: 


nicoderm


Status: Chronic





(4) Low back pain


Assessment & Plan: 


ibuprofen prn


Status: Chronic





(5) Scrotal edema


Status: Chronic





(6) Prediabetes


Assessment & Plan: 


dietary


Status: Chronic





(7) Neurocognitive disorder


Assessment & Plan: 


cont med


spendign placement


Status: Chronic

## 2016-10-02 RX ADMIN — DIVALPROEX SODIUM SCH MG: 250 TABLET, DELAYED RELEASE ORAL at 07:59

## 2016-10-02 RX ADMIN — DIVALPROEX SODIUM SCH MG: 250 TABLET, DELAYED RELEASE ORAL at 16:00

## 2016-10-02 NOTE — CP.PCM.PN
Subjective





- Date & Time of Evaluation


Date of Evaluation: 10/02/16


Time of Evaluation: 12:01





- Subjective


Subjective: 


no complaints/distress.


no fcn /v/d


pending placement


ros negative





Objective





- Vital Signs/Intake and Output


Vital Signs (last 24 hours): 


 











Temp Pulse Resp BP Pulse Ox


 


 97.9 F   79   20   136/70   99 


 


 10/02/16 08:54  10/02/16 08:54  10/02/16 08:54  10/02/16 08:54  10/02/16 08:54











- Medications


Medications: 


 Current Medications





Aspirin (Ecotrin)  81 mg PO DAILY [facility]


   Last Admin: 10/02/16 07:59 Dose:  81 mg


Divalproex Sodium (Depakote Dr(*Bid*))  500 mg PO BID [facility]


   Last Admin: 10/02/16 07:59 Dose:  500 mg


Enalapril Maleate (Vasotec)  10 mg PO DAILY [facility]


   Last Admin: 10/02/16 07:59 Dose:  10 mg


Famotidine (Pepcid)  20 mg PO BID [facility]


   Last Admin: 10/02/16 07:59 Dose:  20 mg


Ibuprofen (Motrin Tab)  600 mg PO Q8 PRN


   PRN Reason: Pain, moderate (4-7)


   Last Admin: 09/18/16 08:56 Dose:  600 mg


Metoprolol Tartrate (Lopressor)  50 mg PO Q12 [facility]


   Last Admin: 10/02/16 07:59 Dose:  50 mg


Nicotine (Nicoderm Cq)  1 patch TD DAILY [facility]


   Last Admin: 10/02/16 07:59 Dose:  1 patch


Quetiapine Fumarate (Seroquel)  25 mg PO HS [facility]


   Last Admin: 10/01/16 22:00 Dose:  25 mg











- Labs


Labs: 


 





 08/02/16 08:10 





 08/02/16 08:10 





 











PT  11.2 SECONDS (9.4-12.0)   12/14/15  05:20    


 


INR  1.05  (0.89-1.20)   12/14/15  05:20    


 


APTT  36.2 SECONDS (23.1-32.0)  H  12/14/15  05:20    














- Constitutional


Appears: Well, Non-toxic, No Acute Distress





- Head Exam


Head Exam: ATRAUMATIC, NORMAL INSPECTION, NORMOCEPHALIC





- Eye Exam


Eye Exam: EOMI, Normal appearance, PERRL


Pupil Exam: NORMAL ACCOMODATION, PERRL





- ENT Exam


ENT Exam: Mucous Membranes Moist, Normal Exam





- Neck Exam


Neck Exam: Full ROM, Normal Inspection.  absent: Lymphadenopathy





- Respiratory Exam


Respiratory Exam: Clear to Ausculation Bilateral, NORMAL BREATHING PATTERN





- Cardiovascular Exam


Cardiovascular Exam: REGULAR RHYTHM, RRR, +S1, +S2.  absent: Murmur





- GI/Abdominal Exam


GI & Abdominal Exam: Soft, Normal Bowel Sounds.  absent: Tenderness





- Extremities Exam


Extremities Exam: Full ROM, Normal Capillary Refill, Normal Inspection.  absent

: Joint Swelling, Pedal Edema





- Back Exam


Back Exam: NORMAL INSPECTION





- Neurological Exam


Neurological Exam: Alert, Awake, CN II-XII Intact, Normal Gait, Oriented x3





- Psychiatric Exam


Psychiatric exam: Normal Affect, Normal Mood





- Skin


Skin Exam: Dry, Intact, Normal Color, Warm





Assessment and Plan


(1) DVT prophylaxis


Status: Chronic





(2) CAD (coronary artery disease)


Status: Chronic





(3) Smoking


Status: Chronic





(4) Low back pain


Status: Chronic





(5) Scrotal edema


Status: Chronic





(6) Prediabetes


Status: Chronic





(7) Neurocognitive disorder


Status: Chronic








- Assessment and Plan (Free Text)


Assessment: 


(1) DVT prophylaxis


Assessment & Plan: 


scd and ae hose


ambulation


Status: Chronic





(2) CAD (coronary artery disease)


Assessment & Plan: 


cont meds


Status: Chronic





(3) Smoking


Assessment & Plan: 


nicoderm


Status: Chronic





(4) Low back pain


Assessment & Plan: 


ibuprofen prn


Status: Chronic





(5) Scrotal edema


Status: Chronic





(6) Prediabetes


Assessment & Plan: 


dietary


Status: Chronic





(7) Neurocognitive disorder


Assessment & Plan: 


cont med


spendign placement


Status: Chronic

## 2016-10-03 RX ADMIN — DIVALPROEX SODIUM SCH MG: 250 TABLET, DELAYED RELEASE ORAL at 08:17

## 2016-10-03 RX ADMIN — DIVALPROEX SODIUM SCH MG: 250 TABLET, DELAYED RELEASE ORAL at 16:26

## 2016-10-03 NOTE — CP.PCM.PN
Subjective





- Date & Time of Evaluation


Date of Evaluation: 10/03/16


Time of Evaluation: 07:00





- Subjective


Subjective: 


no complaints, distress


no f/c, n/v/d


pending placement


ros negative





Objective





- Vital Signs/Intake and Output


Vital Signs (last 24 hours): 


 











Temp Pulse Resp BP Pulse Ox


 


 97.3 F L  78   19   133/76   97 


 


 10/03/16 00:00  10/03/16 00:00  10/03/16 00:00  10/03/16 00:00  10/03/16 00:00











- Medications


Medications: 


 Current Medications





Aspirin (Ecotrin)  81 mg PO DAILY [facility]


   Last Admin: 10/02/16 07:59 Dose:  81 mg


Divalproex Sodium (Depakote Dr(*Bid*))  500 mg PO BID [facility]


   Last Admin: 10/02/16 16:00 Dose:  500 mg


Enalapril Maleate (Vasotec)  10 mg PO DAILY [facility]


   Last Admin: 10/02/16 07:59 Dose:  10 mg


Famotidine (Pepcid)  20 mg PO BID [facility]


   Last Admin: 10/02/16 16:00 Dose:  20 mg


Ibuprofen (Motrin Tab)  600 mg PO Q8 PRN


   PRN Reason: Pain, moderate (4-7)


   Last Admin: 09/18/16 08:56 Dose:  600 mg


Metoprolol Tartrate (Lopressor)  50 mg PO Q12 [facility]


   Last Admin: 10/02/16 21:04 Dose:  50 mg


Nicotine (Nicoderm Cq)  1 patch TD DAILY [facility]


   Last Admin: 10/02/16 07:59 Dose:  1 patch


Quetiapine Fumarate (Seroquel)  25 mg PO HS [facility]


   Last Admin: 10/02/16 21:05 Dose:  25 mg











- Labs


Labs: 


 





 08/02/16 08:10 





 08/02/16 08:10 





 











PT  11.2 SECONDS (9.4-12.0)   12/14/15  05:20    


 


INR  1.05  (0.89-1.20)   12/14/15  05:20    


 


APTT  36.2 SECONDS (23.1-32.0)  H  12/14/15  05:20    














- Constitutional


Appears: Well, Non-toxic, No Acute Distress





- Head Exam


Head Exam: ATRAUMATIC, NORMAL INSPECTION, NORMOCEPHALIC





- Eye Exam


Eye Exam: EOMI, Normal appearance, PERRL


Pupil Exam: NORMAL ACCOMODATION, PERRL





- ENT Exam


ENT Exam: Mucous Membranes Moist, Normal Exam





- Neck Exam


Neck Exam: Full ROM, Normal Inspection.  absent: Lymphadenopathy





- Respiratory Exam


Respiratory Exam: Clear to Ausculation Bilateral, NORMAL BREATHING PATTERN





- Cardiovascular Exam


Cardiovascular Exam: REGULAR RHYTHM, RRR, +S1, +S2.  absent: Murmur





- GI/Abdominal Exam


GI & Abdominal Exam: Soft, Normal Bowel Sounds.  absent: Tenderness





- Extremities Exam


Extremities Exam: Full ROM, Normal Capillary Refill, Normal Inspection.  absent

: Joint Swelling, Pedal Edema





- Back Exam


Back Exam: NORMAL INSPECTION





- Neurological Exam


Neurological Exam: Alert, Awake, CN II-XII Intact, Normal Gait, Oriented x3





- Psychiatric Exam


Psychiatric exam: Normal Affect, Normal Mood





- Skin


Skin Exam: Dry, Intact, Normal Color, Warm





Assessment and Plan


(1) DVT prophylaxis


Status: Chronic





(2) CAD (coronary artery disease)


Status: Chronic





(3) Smoking


Status: Chronic





(4) Low back pain


Status: Chronic





(5) Scrotal edema


Status: Chronic





(6) Prediabetes


Status: Chronic





(7) Neurocognitive disorder


Status: Chronic








- Assessment and Plan (Free Text)


Assessment: 


(1) DVT prophylaxis


Assessment & Plan: 


scd and ae hose


ambulation


Status: Chronic





(2) CAD (coronary artery disease)


Assessment & Plan: 


cont meds


Status: Chronic





(3) Smoking


Assessment & Plan: 


nicoderm


Status: Chronic





(4) Low back pain


Assessment & Plan: 


ibuprofen prn


Status: Chronic





(5) Scrotal edema


Status: Chronic





(6) Prediabetes


Assessment & Plan: 


dietary


Status: Chronic





(7) Neurocognitive disorder


Assessment & Plan: 


cont med


spendign placement


Status: Chronic

## 2016-10-04 RX ADMIN — DIVALPROEX SODIUM SCH MG: 250 TABLET, DELAYED RELEASE ORAL at 16:45

## 2016-10-04 RX ADMIN — DIVALPROEX SODIUM SCH MG: 250 TABLET, DELAYED RELEASE ORAL at 09:06

## 2016-10-04 NOTE — CP.PCM.PN
Subjective





- Date & Time of Evaluation


Date of Evaluation: 10/04/16


Time of Evaluation: 06:53





- Subjective


Subjective: 


no complaints/distress


no f/c, n/v/d


peidng placement





Objective





- Vital Signs/Intake and Output


Vital Signs (last 24 hours): 


 











Temp Pulse Resp BP Pulse Ox


 


 97.8 F   77   18   112/72   99 


 


 10/04/16 01:24  10/04/16 01:24  10/04/16 01:24  10/04/16 01:24  10/04/16 01:24











- Medications


Medications: 


 Current Medications





Aspirin (Ecotrin)  81 mg PO DAILY [facility]


   Last Admin: 10/03/16 08:20 Dose:  81 mg


Divalproex Sodium (Depakote Dr(*Bid*))  500 mg PO BID [facility]


   Last Admin: 10/03/16 16:26 Dose:  500 mg


Enalapril Maleate (Vasotec)  10 mg PO DAILY [facility]


   Last Admin: 10/03/16 08:20 Dose:  10 mg


Famotidine (Pepcid)  20 mg PO BID [facility]


   Last Admin: 10/03/16 16:26 Dose:  20 mg


Ibuprofen (Motrin Tab)  600 mg PO Q8 PRN


   PRN Reason: Pain, moderate (4-7)


   Last Admin: 09/18/16 08:56 Dose:  600 mg


Metoprolol Tartrate (Lopressor)  50 mg PO Q12 [facility]


   Last Admin: 10/03/16 21:01 Dose:  50 mg


Nicotine (Nicoderm Cq)  1 patch TD DAILY [facility]


   Last Admin: 10/03/16 08:20 Dose:  1 patch


Quetiapine Fumarate (Seroquel)  25 mg PO HS [facility]


   Last Admin: 10/03/16 21:01 Dose:  25 mg











- Labs


Labs: 


 





 08/02/16 08:10 





 08/02/16 08:10 





 











PT  11.2 SECONDS (9.4-12.0)   12/14/15  05:20    


 


INR  1.05  (0.89-1.20)   12/14/15  05:20    


 


APTT  36.2 SECONDS (23.1-32.0)  H  12/14/15  05:20    














- Constitutional


Appears: Well, Non-toxic, No Acute Distress





- Head Exam


Head Exam: ATRAUMATIC, NORMAL INSPECTION, NORMOCEPHALIC





- Eye Exam


Eye Exam: EOMI, Normal appearance, PERRL


Pupil Exam: NORMAL ACCOMODATION, PERRL





- ENT Exam


ENT Exam: Mucous Membranes Moist, Normal Exam





- Neck Exam


Neck Exam: Full ROM, Normal Inspection.  absent: Lymphadenopathy





- Respiratory Exam


Respiratory Exam: Clear to Ausculation Bilateral, NORMAL BREATHING PATTERN





- Cardiovascular Exam


Cardiovascular Exam: REGULAR RHYTHM, RRR, +S1, +S2.  absent: Murmur





- GI/Abdominal Exam


GI & Abdominal Exam: Soft, Normal Bowel Sounds.  absent: Tenderness





- Extremities Exam


Extremities Exam: Full ROM, Normal Capillary Refill, Normal Inspection.  absent

: Joint Swelling, Pedal Edema





- Back Exam


Back Exam: NORMAL INSPECTION





- Neurological Exam


Neurological Exam: Alert, Awake, CN II-XII Intact, Normal Gait, Oriented x3





- Psychiatric Exam


Psychiatric exam: Normal Affect, Normal Mood





- Skin


Skin Exam: Dry, Intact, Normal Color, Warm





Assessment and Plan


(1) DVT prophylaxis


Status: Chronic





(2) CAD (coronary artery disease)


Status: Chronic





(3) Smoking


Status: Chronic





(4) Low back pain


Status: Chronic





(5) Scrotal edema


Status: Chronic





(6) Prediabetes


Status: Chronic





(7) Neurocognitive disorder


Status: Chronic








- Assessment and Plan (Free Text)


Assessment: 


(1) DVT prophylaxis


Assessment & Plan: 


scd and ae hose


ambulation


Status: Chronic





(2) CAD (coronary artery disease)


Assessment & Plan: 


cont meds


Status: Chronic





(3) Smoking


Assessment & Plan: 


nicoderm


Status: Chronic





(4) Low back pain


Assessment & Plan: 


ibuprofen prn


Status: Chronic





(5) Scrotal edema


Status: Chronic





(6) Prediabetes


Assessment & Plan: 


dietary


Status: Chronic





(7) Neurocognitive disorder


Assessment & Plan: 


cont med


spendign placement


Status: Chronic

## 2016-10-05 RX ADMIN — DIVALPROEX SODIUM SCH MG: 250 TABLET, DELAYED RELEASE ORAL at 09:08

## 2016-10-05 RX ADMIN — DIVALPROEX SODIUM SCH MG: 250 TABLET, DELAYED RELEASE ORAL at 16:58

## 2016-10-05 NOTE — CP.PCM.PN
Subjective





- Date & Time of Evaluation


Date of Evaluation: 10/05/16


Time of Evaluation: 06:43





- Subjective


Subjective: 


no distress/complaints.


no f/c, n/v/d


ros negative


pending placement





Objective





- Vital Signs/Intake and Output


Vital Signs (last 24 hours): 


 











Temp Pulse Resp BP Pulse Ox


 


 98.5 F   72   20   106/70   99 


 


 10/05/16 00:20  10/05/16 00:20  10/05/16 00:20  10/05/16 00:20  10/05/16 00:20











- Medications


Medications: 


 Current Medications





Aspirin (Ecotrin)  81 mg PO DAILY [facility]


   Last Admin: 10/04/16 09:05 Dose:  81 mg


Divalproex Sodium (Depakote Dr(*Bid*))  500 mg PO BID [facility]


   Last Admin: 10/04/16 16:45 Dose:  500 mg


Enalapril Maleate (Vasotec)  10 mg PO DAILY [facility]


   Last Admin: 10/04/16 09:06 Dose:  10 mg


Famotidine (Pepcid)  20 mg PO BID [facility]


   Last Admin: 10/04/16 16:45 Dose:  20 mg


Ibuprofen (Motrin Tab)  600 mg PO Q8 PRN


   PRN Reason: Pain, moderate (4-7)


   Last Admin: 09/18/16 08:56 Dose:  600 mg


Metoprolol Tartrate (Lopressor)  50 mg PO Q12 [facility]


   Last Admin: 10/04/16 22:00 Dose:  50 mg


Nicotine (Nicoderm Cq)  1 patch TD DAILY [facility]


   Last Admin: 10/04/16 09:05 Dose:  1 patch


Quetiapine Fumarate (Seroquel)  25 mg PO HS [facility]


   Last Admin: 10/04/16 22:02 Dose:  25 mg











- Labs


Labs: 


 





 08/02/16 08:10 





 08/02/16 08:10 





 











PT  11.2 SECONDS (9.4-12.0)   12/14/15  05:20    


 


INR  1.05  (0.89-1.20)   12/14/15  05:20    


 


APTT  36.2 SECONDS (23.1-32.0)  H  12/14/15  05:20    














- Constitutional


Appears: Well, Non-toxic, No Acute Distress





- Head Exam


Head Exam: ATRAUMATIC, NORMAL INSPECTION, NORMOCEPHALIC





- Eye Exam


Eye Exam: EOMI, Normal appearance, PERRL


Pupil Exam: NORMAL ACCOMODATION, PERRL





- ENT Exam


ENT Exam: Mucous Membranes Moist, Normal Exam





- Neck Exam


Neck Exam: Full ROM, Normal Inspection.  absent: Lymphadenopathy





- Respiratory Exam


Respiratory Exam: Clear to Ausculation Bilateral, NORMAL BREATHING PATTERN





- Cardiovascular Exam


Cardiovascular Exam: REGULAR RHYTHM, RRR, +S1, +S2.  absent: Murmur





- GI/Abdominal Exam


GI & Abdominal Exam: Soft, Normal Bowel Sounds.  absent: Tenderness





- Extremities Exam


Extremities Exam: Full ROM, Normal Capillary Refill, Normal Inspection.  absent

: Joint Swelling, Pedal Edema





- Back Exam


Back Exam: NORMAL INSPECTION





- Neurological Exam


Neurological Exam: Alert, Awake, CN II-XII Intact, Normal Gait, Oriented x3





- Psychiatric Exam


Psychiatric exam: Normal Affect, Normal Mood





- Skin


Skin Exam: Dry, Intact, Normal Color, Warm





Assessment and Plan


(1) DVT prophylaxis


Status: Chronic





(2) CAD (coronary artery disease)


Status: Chronic





(3) Smoking


Status: Chronic





(4) Low back pain


Status: Chronic





(5) Scrotal edema


Status: Chronic





(6) Prediabetes


Status: Chronic





(7) Neurocognitive disorder


Status: Chronic








- Assessment and Plan (Free Text)


Assessment: 


(1) DVT prophylaxis


Assessment & Plan: 


scd and ae hose


ambulation


Status: Chronic





(2) CAD (coronary artery disease)


Assessment & Plan: 


cont meds


Status: Chronic





(3) Smoking


Assessment & Plan: 


nicoderm


Status: Chronic





(4) Low back pain


Assessment & Plan: 


ibuprofen prn


Status: Chronic





(5) Scrotal edema


Status: Chronic





(6) Prediabetes


Assessment & Plan: 


dietary


Status: Chronic





(7) Neurocognitive disorder


Assessment & Plan: 


cont med


spendign placement


Status: Chronic

## 2016-10-06 RX ADMIN — DIVALPROEX SODIUM SCH MG: 250 TABLET, DELAYED RELEASE ORAL at 09:06

## 2016-10-06 RX ADMIN — DIVALPROEX SODIUM SCH MG: 250 TABLET, DELAYED RELEASE ORAL at 16:18

## 2016-10-06 NOTE — CP.PCM.PN
Subjective





- Date & Time of Evaluation


Date of Evaluation: 10/06/16


Time of Evaluation: 07:24





- Subjective


Subjective: 


no f/c, n/v/d


pending placement


no compalitns/distress





Objective





- Vital Signs/Intake and Output


Vital Signs (last 24 hours): 


 











Temp Pulse Resp BP Pulse Ox


 


 98.2 F   78   20   110/77   99 


 


 10/06/16 00:33  10/06/16 00:33  10/06/16 00:33  10/06/16 00:33  10/06/16 00:33











- Medications


Medications: 


 Current Medications





Aspirin (Ecotrin)  81 mg PO DAILY [facility]


   Last Admin: 10/05/16 09:08 Dose:  81 mg


Divalproex Sodium (Depakote Dr(*Bid*))  500 mg PO BID [facility]


   Last Admin: 10/05/16 16:58 Dose:  500 mg


Enalapril Maleate (Vasotec)  10 mg PO DAILY [facility]


   Last Admin: 10/05/16 09:09 Dose:  10 mg


Famotidine (Pepcid)  20 mg PO BID [facility]


   Last Admin: 10/05/16 16:58 Dose:  20 mg


Ibuprofen (Motrin Tab)  600 mg PO Q8 PRN


   PRN Reason: Pain, moderate (4-7)


   Last Admin: 09/18/16 08:56 Dose:  600 mg


Metoprolol Tartrate (Lopressor)  50 mg PO Q12 [facility]


   Last Admin: 10/05/16 21:17 Dose:  50 mg


Nicotine (Nicoderm Cq)  1 patch TD DAILY [facility]


   Last Admin: 10/05/16 09:09 Dose:  1 patch


Quetiapine Fumarate (Seroquel)  25 mg PO HS [facility]


   Last Admin: 10/05/16 21:17 Dose:  25 mg











- Labs


Labs: 


 





 08/02/16 08:10 





 08/02/16 08:10 





 











PT  11.2 SECONDS (9.4-12.0)   12/14/15  05:20    


 


INR  1.05  (0.89-1.20)   12/14/15  05:20    


 


APTT  36.2 SECONDS (23.1-32.0)  H  12/14/15  05:20    














- Constitutional


Appears: Well, Non-toxic, No Acute Distress





- Head Exam


Head Exam: ATRAUMATIC, NORMAL INSPECTION, NORMOCEPHALIC





- Eye Exam


Eye Exam: EOMI, Normal appearance, PERRL


Pupil Exam: NORMAL ACCOMODATION, PERRL





- ENT Exam


ENT Exam: Mucous Membranes Moist, Normal Exam





- Neck Exam


Neck Exam: Full ROM, Normal Inspection.  absent: Lymphadenopathy





- Respiratory Exam


Respiratory Exam: Clear to Ausculation Bilateral, NORMAL BREATHING PATTERN





- Cardiovascular Exam


Cardiovascular Exam: REGULAR RHYTHM, +S1, +S2.  absent: Murmur





- GI/Abdominal Exam


GI & Abdominal Exam: Soft, Normal Bowel Sounds.  absent: Tenderness





- Extremities Exam


Extremities Exam: Full ROM, Normal Capillary Refill, Normal Inspection.  absent

: Joint Swelling, Pedal Edema





- Back Exam


Back Exam: NORMAL INSPECTION





- Neurological Exam


Neurological Exam: Alert, Awake, CN II-XII Intact, Normal Gait, Oriented x3





- Psychiatric Exam


Psychiatric exam: Normal Affect, Normal Mood





- Skin


Skin Exam: Dry, Intact, Normal Color, Warm





Assessment and Plan


(1) DVT prophylaxis


Status: Chronic





(2) CAD (coronary artery disease)


Status: Chronic





(3) Smoking


Status: Chronic





(4) Low back pain


Status: Chronic





(5) Scrotal edema


Status: Chronic





(6) Prediabetes


Status: Chronic





(7) Neurocognitive disorder


Status: Chronic








- Assessment and Plan (Free Text)


Assessment: 


(1) DVT prophylaxis


Assessment & Plan: 


scd and ae hose


ambulation


Status: Chronic





(2) CAD (coronary artery disease)


Assessment & Plan: 


cont meds


Status: Chronic





(3) Smoking


Assessment & Plan: 


nicoderm


Status: Chronic





(4) Low back pain


Assessment & Plan: 


ibuprofen prn


Status: Chronic





(5) Scrotal edema


Status: Chronic





(6) Prediabetes


Assessment & Plan: 


dietary


Status: Chronic





(7) Neurocognitive disorder


Assessment & Plan: 


cont med


pending placement


Status: Chronic

## 2016-10-07 RX ADMIN — DIVALPROEX SODIUM SCH MG: 250 TABLET, DELAYED RELEASE ORAL at 16:28

## 2016-10-07 RX ADMIN — DIVALPROEX SODIUM SCH MG: 250 TABLET, DELAYED RELEASE ORAL at 09:00

## 2016-10-07 NOTE — CP.PCM.PN
Subjective





- Date & Time of Evaluation


Date of Evaluation: 10/07/16


Time of Evaluation: 09:52





- Subjective


Subjective: 


no f/c, n/v/d


calma dn cooperative


n o distres


peding placement


ros negative





Objective





- Vital Signs/Intake and Output


Vital Signs (last 24 hours): 


 











Temp Pulse Resp BP Pulse Ox


 


 97.9 F   78   20   138/70   99 


 


 10/07/16 08:32  10/07/16 09:00  10/07/16 08:32  10/07/16 09:00  10/07/16 08:32











- Medications


Medications: 


 Current Medications





Aspirin (Ecotrin)  81 mg PO DAILY [facility]


   Last Admin: 10/07/16 09:01 Dose:  81 mg


Divalproex Sodium (Depakote Dr(*Bid*))  500 mg PO BID [facility]


   Last Admin: 10/07/16 09:00 Dose:  500 mg


Enalapril Maleate (Vasotec)  10 mg PO DAILY [facility]


   Last Admin: 10/07/16 09:01 Dose:  10 mg


Famotidine (Pepcid)  20 mg PO BID [facility]


   Last Admin: 10/07/16 09:00 Dose:  20 mg


Ibuprofen (Motrin Tab)  600 mg PO Q8 PRN


   PRN Reason: Pain, moderate (4-7)


   Last Admin: 09/18/16 08:56 Dose:  600 mg


Metoprolol Tartrate (Lopressor)  50 mg PO Q12 [facility]


   Last Admin: 10/07/16 09:00 Dose:  50 mg


Nicotine (Nicoderm Cq)  1 patch TD DAILY [facility]


   Last Admin: 10/07/16 09:01 Dose:  1 patch


Quetiapine Fumarate (Seroquel)  25 mg PO HS [facility]


   Last Admin: 10/06/16 21:16 Dose:  25 mg











- Labs


Labs: 


 





 08/02/16 08:10 





 08/02/16 08:10 





 











PT  11.2 SECONDS (9.4-12.0)   12/14/15  05:20    


 


INR  1.05  (0.89-1.20)   12/14/15  05:20    


 


APTT  36.2 SECONDS (23.1-32.0)  H  12/14/15  05:20    














- Constitutional


Appears: Well, Non-toxic, No Acute Distress





- Head Exam


Head Exam: ATRAUMATIC, NORMAL INSPECTION, NORMOCEPHALIC





- Eye Exam


Eye Exam: EOMI, Normal appearance, PERRL


Pupil Exam: NORMAL ACCOMODATION, PERRL





- ENT Exam


ENT Exam: Mucous Membranes Moist, Normal Exam





- Neck Exam


Neck Exam: Full ROM, Normal Inspection.  absent: Lymphadenopathy





- Respiratory Exam


Respiratory Exam: Clear to Ausculation Bilateral, NORMAL BREATHING PATTERN





- Cardiovascular Exam


Cardiovascular Exam: REGULAR RHYTHM, +S1, +S2.  absent: Murmur





- GI/Abdominal Exam


GI & Abdominal Exam: Soft, Normal Bowel Sounds.  absent: Tenderness





- Extremities Exam


Extremities Exam: Full ROM, Normal Capillary Refill, Normal Inspection.  absent

: Joint Swelling, Pedal Edema





- Back Exam


Back Exam: NORMAL INSPECTION





- Neurological Exam


Neurological Exam: Alert, Awake, CN II-XII Intact, Normal Gait, Oriented x3





- Psychiatric Exam


Psychiatric exam: Normal Affect, Normal Mood





- Skin


Skin Exam: Dry, Intact, Normal Color, Warm





Assessment and Plan


(1) DVT prophylaxis


Assessment & Plan: 


scd and ae hsoe


ambualtion


Status: Chronic





(2) CAD (coronary artery disease)


Assessment & Plan: 


cont meds


s/p cardiac arrest


Status: Chronic





(3) Smoking


Assessment & Plan: 


wean off nicoderm


Status: Chronic





(4) Low back pain


Assessment & Plan: 


ibuprofen rpn


Status: Chronic





(5) Scrotal edema


Status: Chronic





(6) Prediabetes


Assessment & Plan: 


dietary control


actiovity


Status: Chronic





(7) Neurocognitive disorder


Assessment & Plan: 


cont meds


epdnign placement


guardian in place


Status: Chronic

## 2016-10-08 RX ADMIN — DIVALPROEX SODIUM SCH MG: 250 TABLET, DELAYED RELEASE ORAL at 09:14

## 2016-10-08 RX ADMIN — DIVALPROEX SODIUM SCH MG: 250 TABLET, DELAYED RELEASE ORAL at 16:25

## 2016-10-08 NOTE — PN
DATE: 10/08/2016



SUBJECTIVE:  The patient evaluated in bed.  No complaints, distress.  No fever, 
chills, nausea, vomiting, diarrhea.  The patient remains pending placement.  
Had Medicaid reinstated and the patient does have a guardian at this point, so 
needs the guardian to make decisions for him.



REVIEW OF SYSTEMS:  Negative.



PHYSICAL EXAMINATION:

GENERAL:  Alert and oriented at his baseline.

HEART:  Regular rate and rhythm.  No murmurs, rubs or gallops.

LUNGS:  Clear in all fields bilaterally.

ABDOMEN:  Soft, nontender.  Bowel sounds x 4.

EXTREMITIES:  Distal pulses, motor sensation intact.  Cap refill is brisk.



DIAGNOSES AND PLAN:  

1.  Coronary artery disease status post cardiac arrest.  Continue medications. 

2.  Neurocognitive disorder, needs a guardian still.  Guardianship in place.  
Pending placement.  Continue medications.  

3.  Deep venous thrombosis prophylaxis.  Sequential compression devices and anti
-embolism hose, ambulation.  

4.  Prediabetes, dietary control.  Will recheck A1c as indiated. 

5.  Chronic intermittent low back pain.  Ibuprofen p.r.n.

6.  Smoking, will continue to wean patient off Nicoderm patch.  



We will continue to monitor patient until placement is established and safe 
discharge is assured.





__________________________________________

Fan SILVER







cc:   



DD: 10/08/2016 07:37:17  [id number]

TT: 10/08/2016 08:22:02

Confirmation # [id number]

Dictation # [id number]

ln

MTDD

## 2016-10-08 NOTE — CP.PCM.PN
Subjective





- Date & Time of Evaluation


Date of Evaluation: 10/08/16


Time of Evaluation: 08:26





- Subjective


Subjective: 


dictated note


no f/c, n/v/d


pendign placement


no distress/complaints





Objective





- Vital Signs/Intake and Output


Vital Signs (last 24 hours): 


 











Temp Pulse Resp BP Pulse Ox


 


 97.3 F L  74   18   121/70   98 


 


 10/08/16 00:50  10/08/16 00:50  10/08/16 00:50  10/08/16 00:50  10/08/16 00:50











- Medications


Medications: 


 Current Medications





Aspirin (Ecotrin)  81 mg PO DAILY [facility]


   Last Admin: 10/07/16 09:01 Dose:  81 mg


Divalproex Sodium (Depakote Dr(*Bid*))  500 mg PO BID [facility]


   Last Admin: 10/07/16 16:28 Dose:  500 mg


Enalapril Maleate (Vasotec)  10 mg PO DAILY [facility]


   Last Admin: 10/07/16 09:01 Dose:  10 mg


Famotidine (Pepcid)  20 mg PO BID [facility]


   Last Admin: 10/07/16 16:28 Dose:  20 mg


Ibuprofen (Motrin Tab)  600 mg PO Q8 PRN


   PRN Reason: Pain, moderate (4-7)


   Last Admin: 09/18/16 08:56 Dose:  600 mg


Metoprolol Tartrate (Lopressor)  50 mg PO Q12 [facility]


   Last Admin: 10/07/16 21:04 Dose:  50 mg


Nicotine (Nicoderm Cq)  1 patch TD DAILY [facility]


   Last Admin: 10/07/16 09:01 Dose:  1 patch


Quetiapine Fumarate (Seroquel)  25 mg PO HS [facility]


   Last Admin: 10/07/16 21:33 Dose:  25 mg











- Labs


Labs: 


 





 08/02/16 08:10 





 08/02/16 08:10 





 











PT  11.2 SECONDS (9.4-12.0)   12/14/15  05:20    


 


INR  1.05  (0.89-1.20)   12/14/15  05:20    


 


APTT  36.2 SECONDS (23.1-32.0)  H  12/14/15  05:20    














Assessment and Plan


(1) DVT prophylaxis


Status: Chronic





(2) CAD (coronary artery disease)


Status: Chronic





(3) Smoking


Status: Chronic





(4) Low back pain


Status: Chronic





(5) Scrotal edema


Status: Chronic





(6) Prediabetes


Status: Chronic





(7) Neurocognitive disorder


Status: Chronic

## 2016-10-09 RX ADMIN — DIVALPROEX SODIUM SCH MG: 250 TABLET, DELAYED RELEASE ORAL at 09:31

## 2016-10-09 RX ADMIN — DIVALPROEX SODIUM SCH MG: 250 TABLET, DELAYED RELEASE ORAL at 17:44

## 2016-10-10 RX ADMIN — DIVALPROEX SODIUM SCH MG: 250 TABLET, DELAYED RELEASE ORAL at 16:08

## 2016-10-10 RX ADMIN — DIVALPROEX SODIUM SCH MG: 250 TABLET, DELAYED RELEASE ORAL at 08:42

## 2016-10-10 NOTE — CP.PCM.PN
Subjective





- Date & Time of Evaluation


Date of Evaluation: 10/09/16


Time of Evaluation: 12:30





- Subjective


Subjective: 


no complaints/distress


no f/c, n/v/d


pending placement


still requires guardian-guardian in place





Objective





- Vital Signs/Intake and Output


Vital Signs (last 24 hours): 


 











Temp Pulse Resp BP Pulse Ox


 


 97.5 F L  72   19   113/63   97 


 


 10/10/16 00:00  10/10/16 00:00  10/10/16 00:00  10/10/16 00:00  10/10/16 00:00











- Medications


Medications: 


 Current Medications





Aspirin (Ecotrin)  81 mg PO DAILY [facility]


   Last Admin: 10/09/16 09:32 Dose:  81 mg


Divalproex Sodium (Depakote Dr(*Bid*))  500 mg PO BID [facility]


   Last Admin: 10/09/16 17:44 Dose:  500 mg


Enalapril Maleate (Vasotec)  10 mg PO DAILY [facility]


   Last Admin: 10/09/16 09:32 Dose:  10 mg


Famotidine (Pepcid)  20 mg PO BID [facility]


   Last Admin: 10/09/16 17:44 Dose:  20 mg


Ibuprofen (Motrin Tab)  600 mg PO Q8 PRN


   PRN Reason: Pain, moderate (4-7)


   Last Admin: 09/18/16 08:56 Dose:  600 mg


Metoprolol Tartrate (Lopressor)  50 mg PO Q12 [facility]


   Last Admin: 10/09/16 21:02 Dose:  50 mg


Quetiapine Fumarate (Seroquel)  25 mg PO HS [facility]


   Last Admin: 10/09/16 21:02 Dose:  25 mg











- Labs


Labs: 


 





 08/02/16 08:10 





 08/02/16 08:10 





 











PT  11.2 SECONDS (9.4-12.0)   12/14/15  05:20    


 


INR  1.05  (0.89-1.20)   12/14/15  05:20    


 


APTT  36.2 SECONDS (23.1-32.0)  H  12/14/15  05:20    














- Constitutional


Appears: Well, Non-toxic, No Acute Distress





- Head Exam


Head Exam: ATRAUMATIC, NORMAL INSPECTION, NORMOCEPHALIC





- Eye Exam


Eye Exam: EOMI, Normal appearance, PERRL


Pupil Exam: NORMAL ACCOMODATION, PERRL





- ENT Exam


ENT Exam: Mucous Membranes Moist, Normal Exam





- Neck Exam


Neck Exam: Full ROM, Normal Inspection.  absent: Lymphadenopathy





- Respiratory Exam


Respiratory Exam: Clear to Ausculation Bilateral, NORMAL BREATHING PATTERN





- Cardiovascular Exam


Cardiovascular Exam: REGULAR RHYTHM, +S1, +S2.  absent: Murmur





- GI/Abdominal Exam


GI & Abdominal Exam: Soft, Normal Bowel Sounds.  absent: Tenderness





- Extremities Exam


Extremities Exam: Full ROM, Normal Capillary Refill, Normal Inspection.  absent

: Joint Swelling, Pedal Edema





- Back Exam


Back Exam: NORMAL INSPECTION





- Neurological Exam


Neurological Exam: Alert, Awake, CN II-XII Intact, Normal Gait, Oriented x3





- Psychiatric Exam


Psychiatric exam: Normal Affect, Normal Mood





- Skin


Skin Exam: Dry, Intact, Normal Color, Warm





Assessment and Plan


(1) DVT prophylaxis


Status: Chronic





(2) CAD (coronary artery disease)


Status: Chronic





(3) Smoking


Status: Chronic





(4) Low back pain


Status: Chronic





(5) Scrotal edema


Status: Chronic





(6) Prediabetes


Status: Chronic





(7) Neurocognitive disorder


Status: Chronic








- Assessment and Plan (Free Text)


Assessment: 


(1) DVT prophylaxis


Assessment & Plan: 


scd and ae hose


ambulation


Status: Chronic





(2) CAD (coronary artery disease)


Assessment & Plan: 


cont meds


Status: Chronic





(3) Smoking


Assessment & Plan: 


nicoderm


Status: Chronic





(4) Low back pain


Assessment & Plan: 


ibuprofen prn


Status: Chronic





(5) Scrotal edema


Status: Chronic





(6) Prediabetes


Assessment & Plan: 


dietary


Status: Chronic





(7) Neurocognitive disorder


Assessment & Plan: 


cont med


pending placement


Status: Chronic

## 2016-10-10 NOTE — CP.PCM.PN
Subjective





- Date & Time of Evaluation


Date of Evaluation: 10/10/16


Time of Evaluation: 07:07





- Subjective


Subjective: 


no f/c, n/v/d


]pending placement


no complaints/distress





Objective





- Vital Signs/Intake and Output


Vital Signs (last 24 hours): 


 











Temp Pulse Resp BP Pulse Ox


 


 97.5 F L  72   19   113/63   97 


 


 10/10/16 00:00  10/10/16 00:00  10/10/16 00:00  10/10/16 00:00  10/10/16 00:00











- Medications


Medications: 


 Current Medications





Aspirin (Ecotrin)  81 mg PO DAILY [facility]


   Last Admin: 10/09/16 09:32 Dose:  81 mg


Divalproex Sodium (Depakote Dr(*Bid*))  500 mg PO BID [facility]


   Last Admin: 10/09/16 17:44 Dose:  500 mg


Enalapril Maleate (Vasotec)  10 mg PO DAILY [facility]


   Last Admin: 10/09/16 09:32 Dose:  10 mg


Famotidine (Pepcid)  20 mg PO BID [facility]


   Last Admin: 10/09/16 17:44 Dose:  20 mg


Ibuprofen (Motrin Tab)  600 mg PO Q8 PRN


   PRN Reason: Pain, moderate (4-7)


   Last Admin: 09/18/16 08:56 Dose:  600 mg


Metoprolol Tartrate (Lopressor)  50 mg PO Q12 [facility]


   Last Admin: 10/09/16 21:02 Dose:  50 mg


Quetiapine Fumarate (Seroquel)  25 mg PO HS [facility]


   Last Admin: 10/09/16 21:02 Dose:  25 mg











- Labs


Labs: 


 





 08/02/16 08:10 





 08/02/16 08:10 





 











PT  11.2 SECONDS (9.4-12.0)   12/14/15  05:20    


 


INR  1.05  (0.89-1.20)   12/14/15  05:20    


 


APTT  36.2 SECONDS (23.1-32.0)  H  12/14/15  05:20    














- Constitutional


Appears: Well, Non-toxic, No Acute Distress





- Head Exam


Head Exam: ATRAUMATIC, NORMAL INSPECTION, NORMOCEPHALIC





- Eye Exam


Eye Exam: EOMI, Normal appearance, PERRL


Pupil Exam: NORMAL ACCOMODATION, PERRL





- ENT Exam


ENT Exam: Mucous Membranes Moist, Normal Exam





- Neck Exam


Neck Exam: Full ROM, Normal Inspection.  absent: Lymphadenopathy





- Respiratory Exam


Respiratory Exam: Clear to Ausculation Bilateral, NORMAL BREATHING PATTERN





- Cardiovascular Exam


Cardiovascular Exam: REGULAR RHYTHM, +S1, +S2.  absent: Murmur





- GI/Abdominal Exam


GI & Abdominal Exam: Soft, Normal Bowel Sounds.  absent: Tenderness





- Extremities Exam


Extremities Exam: Full ROM, Normal Capillary Refill, Normal Inspection.  absent

: Joint Swelling, Pedal Edema





- Back Exam


Back Exam: NORMAL INSPECTION





- Neurological Exam


Neurological Exam: Alert, Awake, CN II-XII Intact, Normal Gait, Oriented x3





- Psychiatric Exam


Psychiatric exam: Normal Affect, Normal Mood





- Skin


Skin Exam: Dry, Intact, Normal Color, Warm





Assessment and Plan


(1) DVT prophylaxis


Status: Chronic





(2) CAD (coronary artery disease)


Status: Chronic





(3) Smoking


Status: Chronic





(4) Low back pain


Status: Chronic





(5) Scrotal edema


Status: Chronic





(6) Prediabetes


Status: Chronic





(7) Neurocognitive disorder


Status: Chronic








- Assessment and Plan (Free Text)


Assessment: 


(1) DVT prophylaxis


Assessment & Plan: 


scd and ae hose


ambulation


Status: Chronic





(2) CAD (coronary artery disease)


Assessment & Plan: 


cont meds


Status: Chronic





(3) Smoking


Assessment & Plan: 


nicoderm


Status: Chronic





(4) Low back pain


Assessment & Plan: 


ibuprofen prn


Status: Chronic





(5) Scrotal edema


Status: Chronic





(6) Prediabetes


Assessment & Plan: 


dietary


Status: Chronic





(7) Neurocognitive disorder


Assessment & Plan: 


cont med


pending placement


Status: Chronic

## 2016-10-11 RX ADMIN — DIVALPROEX SODIUM SCH MG: 250 TABLET, DELAYED RELEASE ORAL at 16:05

## 2016-10-11 RX ADMIN — DIVALPROEX SODIUM SCH MG: 250 TABLET, DELAYED RELEASE ORAL at 09:55

## 2016-10-11 NOTE — CP.PCM.PN
Subjective





- Date & Time of Evaluation


Date of Evaluation: 10/11/16


Time of Evaluation: 07:40





- Subjective


Subjective: 


no compalitns/distress


no f/c, n/v/d


pending palcement








Objective





- Vital Signs/Intake and Output


Vital Signs (last 24 hours): 


 











Temp Pulse Resp BP Pulse Ox


 


 97.5 F L  73   19   109/77   98 


 


 10/11/16 00:00  10/11/16 00:00  10/11/16 00:00  10/11/16 00:00  10/11/16 00:00











- Medications


Medications: 


 Current Medications





Aspirin (Ecotrin)  81 mg PO DAILY [facility]


   Last Admin: 10/10/16 08:44 Dose:  81 mg


Divalproex Sodium (Depakote Dr(*Bid*))  500 mg PO BID [facility]


   Last Admin: 10/10/16 16:08 Dose:  500 mg


Enalapril Maleate (Vasotec)  10 mg PO DAILY [facility]


   Last Admin: 10/10/16 08:43 Dose:  10 mg


Famotidine (Pepcid)  20 mg PO BID [facility]


   Last Admin: 10/10/16 16:08 Dose:  20 mg


Ibuprofen (Motrin Tab)  600 mg PO Q8 PRN


   PRN Reason: Pain, moderate (4-7)


   Last Admin: 09/18/16 08:56 Dose:  600 mg


Metoprolol Tartrate (Lopressor)  50 mg PO Q12 [facility]


   Last Admin: 10/10/16 20:12 Dose:  50 mg


Quetiapine Fumarate (Seroquel)  25 mg PO HS [facility]


   Last Admin: 10/10/16 21:09 Dose:  25 mg











- Labs


Labs: 


 





 08/02/16 08:10 





 08/02/16 08:10 





 











PT  11.2 SECONDS (9.4-12.0)   12/14/15  05:20    


 


INR  1.05  (0.89-1.20)   12/14/15  05:20    


 


APTT  36.2 SECONDS (23.1-32.0)  H  12/14/15  05:20    














- Constitutional


Appears: Well, Non-toxic, No Acute Distress





- Head Exam


Head Exam: ATRAUMATIC, NORMAL INSPECTION, NORMOCEPHALIC





- Eye Exam


Eye Exam: EOMI, Normal appearance, PERRL


Pupil Exam: NORMAL ACCOMODATION, PERRL





- ENT Exam


ENT Exam: Mucous Membranes Moist, Normal Exam





- Neck Exam


Neck Exam: Full ROM, Normal Inspection.  absent: Lymphadenopathy





- Respiratory Exam


Respiratory Exam: Clear to Ausculation Bilateral, NORMAL BREATHING PATTERN





- Cardiovascular Exam


Cardiovascular Exam: REGULAR RHYTHM, RRR, +S1, +S2.  absent: Murmur





- GI/Abdominal Exam


GI & Abdominal Exam: Soft, Normal Bowel Sounds.  absent: Tenderness





- Extremities Exam


Extremities Exam: Full ROM, Normal Capillary Refill, Normal Inspection.  absent

: Joint Swelling, Pedal Edema





- Back Exam


Back Exam: NORMAL INSPECTION





- Neurological Exam


Neurological Exam: Alert, Awake, CN II-XII Intact, Normal Gait, Oriented x3





- Psychiatric Exam


Psychiatric exam: Normal Affect, Normal Mood





- Skin


Skin Exam: Dry, Intact, Normal Color, Warm





Assessment and Plan


(1) DVT prophylaxis


Status: Chronic





(2) CAD (coronary artery disease)


Status: Chronic





(3) Smoking


Status: Chronic





(4) Low back pain


Status: Chronic





(5) Scrotal edema


Status: Chronic





(6) Prediabetes


Status: Chronic





(7) Neurocognitive disorder


Status: Chronic








- Assessment and Plan (Free Text)


Assessment: 


(1) DVT prophylaxis


Assessment & Plan: 


scd and ae hose


ambulation


Status: Chronic





(2) CAD (coronary artery disease)


Assessment & Plan: 


cont meds


Status: Chronic





(3) Smoking


Assessment & Plan: 


nicoderm


Status: Chronic





(4) Low back pain


Assessment & Plan: 


ibuprofen prn


Status: Chronic





(5) Scrotal edema


Status: Chronic





(6) Prediabetes


Assessment & Plan: 


dietary


Status: Chronic





(7) Neurocognitive disorder


Assessment & Plan: 


cont med


pending placement


Status: Chronic

## 2016-10-12 RX ADMIN — DIVALPROEX SODIUM SCH MG: 250 TABLET, DELAYED RELEASE ORAL at 09:01

## 2016-10-12 RX ADMIN — DIVALPROEX SODIUM SCH MG: 250 TABLET, DELAYED RELEASE ORAL at 16:32

## 2016-10-13 RX ADMIN — DIVALPROEX SODIUM SCH MG: 250 TABLET, DELAYED RELEASE ORAL at 08:52

## 2016-10-13 RX ADMIN — DIVALPROEX SODIUM SCH MG: 250 TABLET, DELAYED RELEASE ORAL at 17:05

## 2016-10-13 NOTE — CP.PCM.PN
Subjective





- Date & Time of Evaluation


Date of Evaluation: 10/13/16


Time of Evaluation: 07:12





- Subjective


Subjective: 


no complaitns/distress


no f/c, n/v/d


pendign placement





Objective





- Vital Signs/Intake and Output


Vital Signs (last 24 hours): 


 











Temp Pulse Resp BP Pulse Ox


 


 97.2 F L  88   20   116/82   99 


 


 10/13/16 00:30  10/13/16 00:30  10/13/16 00:30  10/13/16 00:30  10/13/16 00:30











- Medications


Medications: 


 Current Medications





Aspirin (Ecotrin)  81 mg PO DAILY [facility]


   Last Admin: 10/12/16 09:01 Dose:  81 mg


Divalproex Sodium (Depakote Dr(*Bid*))  500 mg PO BID [facility]


   Last Admin: 10/12/16 16:32 Dose:  500 mg


Enalapril Maleate (Vasotec)  10 mg PO DAILY [facility]


   Last Admin: 10/12/16 09:01 Dose:  10 mg


Famotidine (Pepcid)  20 mg PO BID [facility]


   Last Admin: 10/12/16 16:32 Dose:  20 mg


Ibuprofen (Motrin Tab)  600 mg PO Q8 PRN


   PRN Reason: Pain, moderate (4-7)


   Last Admin: 09/18/16 08:56 Dose:  600 mg


Metoprolol Tartrate (Lopressor)  50 mg PO Q12 [facility]


   Last Admin: 10/12/16 21:23 Dose:  50 mg


Quetiapine Fumarate (Seroquel)  25 mg PO HS [facility]


   Last Admin: 10/12/16 21:23 Dose:  25 mg











- Labs


Labs: 


 





 08/02/16 08:10 





 08/02/16 08:10 





 











PT  11.2 SECONDS (9.4-12.0)   12/14/15  05:20    


 


INR  1.05  (0.89-1.20)   12/14/15  05:20    


 


APTT  36.2 SECONDS (23.1-32.0)  H  12/14/15  05:20    














- Constitutional


Appears: Well, Non-toxic, No Acute Distress





- Head Exam


Head Exam: ATRAUMATIC, NORMAL INSPECTION, NORMOCEPHALIC





- Eye Exam


Eye Exam: EOMI, Normal appearance, PERRL


Pupil Exam: NORMAL ACCOMODATION, PERRL





- ENT Exam


ENT Exam: Mucous Membranes Moist, Normal Exam





- Neck Exam


Neck Exam: Full ROM, Normal Inspection.  absent: Lymphadenopathy





- Respiratory Exam


Respiratory Exam: Clear to Ausculation Bilateral, NORMAL BREATHING PATTERN





- Cardiovascular Exam


Cardiovascular Exam: REGULAR RHYTHM, RRR, +S1, +S2.  absent: Murmur





- GI/Abdominal Exam


GI & Abdominal Exam: Soft, Normal Bowel Sounds.  absent: Tenderness





- Extremities Exam


Extremities Exam: Full ROM, Normal Capillary Refill, Normal Inspection.  absent

: Joint Swelling, Pedal Edema





- Back Exam


Back Exam: NORMAL INSPECTION





- Neurological Exam


Neurological Exam: Alert, Awake, CN II-XII Intact, Normal Gait, Oriented x3





- Psychiatric Exam


Psychiatric exam: Normal Affect, Normal Mood





- Skin


Skin Exam: Dry, Intact, Normal Color, Warm





Assessment and Plan


(1) DVT prophylaxis


Status: Chronic





(2) CAD (coronary artery disease)


Status: Chronic





(3) Smoking


Status: Chronic





(4) Low back pain


Status: Chronic





(5) Scrotal edema


Status: Chronic





(6) Prediabetes


Status: Chronic





(7) Neurocognitive disorder


Status: Chronic








- Assessment and Plan (Free Text)


Assessment: 


(1) DVT prophylaxis


Assessment & Plan: 


scd and ae hose


ambulation


Status: Chronic





(2) CAD (coronary artery disease)


Assessment & Plan: 


cont meds


Status: Chronic





(3) Smoking


Assessment & Plan: 


nicoderm


Status: Chronic





(4) Low back pain


Assessment & Plan: 


ibuprofen prn


Status: Chronic





(5) Scrotal edema


Status: Chronic





(6) Prediabetes


Assessment & Plan: 


dietary


Status: Chronic





(7) Neurocognitive disorder


Assessment & Plan: 


cont med


spendign placement


Status: Chronic

## 2016-10-14 RX ADMIN — DIVALPROEX SODIUM SCH MG: 250 TABLET, DELAYED RELEASE ORAL at 09:14

## 2016-10-14 RX ADMIN — DIVALPROEX SODIUM SCH MG: 250 TABLET, DELAYED RELEASE ORAL at 17:26

## 2016-10-14 NOTE — PN
DATE: 10/14/2016



The patient evaluated in bed.  No complaints of distress.  No fever, chills, 
nausea, vomiting, diarrhea.  Still pending placement.



REVIEW OF SYSTEMS:  Negative.



PHYSICAL EXAMINATION:

GENERAL:  Alert, oriented at his baseline.

HEART:  Regular rate.  lungs cta bilaterally.

ABDOMEN:  Soft, nontender.  Bowel sounds x 4.

EXTREMITIES:  Distal pulses, motor sensation intact.  Cap refill is brisk.



DIAGNOSES AND PLAN:  

1.  Deep venous thrombosis prophylaxis, with ambulation.  

2.  Coronary artery disease/cardiac arrest, continue medications.

3.  Neurocognitive disorder.  Continue medications, pending placement.

4.  Smoking, Nicoderm patch.  We will continue to monitor closely.

5.  Chronic intermittent low back pain.  Ibuprofen p.r.n. 



We will continue to monitor patient closely until placement established and 
discharge.





__________________________________________

Fan SILVER







cc:   



DD: 10/14/2016 12:40:32  [id number]

TT: 10/14/2016 13:02:57

Confirmation # [id number]

Dictation # [id number]

jn

MTDD

## 2016-10-14 NOTE — CP.PCM.PN
Subjective





- Date & Time of Evaluation


Date of Evaluation: 10/14/16


Time of Evaluation: 11:17





- Subjective


Subjective: 


dictated note


no distress


no complaitns


no f/c, n/v/d


penignguardianship





Objective





- Vital Signs/Intake and Output


Vital Signs (last 24 hours): 


 











Temp Pulse Resp BP Pulse Ox


 


 97.7 F   71   20   102/75   97 


 


 10/14/16 08:10  10/14/16 09:12  10/14/16 08:10  10/14/16 09:12  10/14/16 08:10











- Medications


Medications: 


 Current Medications





Aspirin (Ecotrin)  81 mg PO DAILY [facility]


   Last Admin: 10/14/16 09:14 Dose:  81 mg


Divalproex Sodium (Depakote Dr(*Bid*))  500 mg PO BID [facility]


   Last Admin: 10/14/16 09:14 Dose:  500 mg


Enalapril Maleate (Vasotec)  10 mg PO DAILY [facility]


   Last Admin: 10/14/16 09:13 Dose:  Not Given


Famotidine (Pepcid)  20 mg PO BID [facility]


   Last Admin: 10/14/16 09:15 Dose:  20 mg


Ibuprofen (Motrin Tab)  600 mg PO Q8 PRN


   PRN Reason: Pain, moderate (4-7)


   Last Admin: 09/18/16 08:56 Dose:  600 mg


Metoprolol Tartrate (Lopressor)  50 mg PO Q12 [facility]


   Last Admin: 10/14/16 09:12 Dose:  Not Given


Quetiapine Fumarate (Seroquel)  25 mg PO HS [facility]


   Last Admin: 10/13/16 21:12 Dose:  25 mg











- Labs


Labs: 


 





 08/02/16 08:10 





 08/02/16 08:10 





 











PT  11.2 SECONDS (9.4-12.0)   12/14/15  05:20    


 


INR  1.05  (0.89-1.20)   12/14/15  05:20    


 


APTT  36.2 SECONDS (23.1-32.0)  H  12/14/15  05:20    














Assessment and Plan


(1) DVT prophylaxis


Status: Chronic





(2) CAD (coronary artery disease)


Status: Chronic





(3) Smoking


Status: Chronic





(4) Low back pain


Status: Chronic





(5) Scrotal edema


Status: Chronic





(6) Prediabetes


Status: Chronic





(7) Neurocognitive disorder


Status: Chronic

## 2016-10-15 RX ADMIN — DIVALPROEX SODIUM SCH MG: 250 TABLET, DELAYED RELEASE ORAL at 09:12

## 2016-10-15 RX ADMIN — DIVALPROEX SODIUM SCH MG: 250 TABLET, DELAYED RELEASE ORAL at 17:08

## 2016-10-15 NOTE — CP.PCM.PN
Subjective





- Date & Time of Evaluation


Date of Evaluation: 10/15/16


Time of Evaluation: 08:34





- Subjective


Subjective: 


no compalints/distress


no f/c, n/v/d


pendign placement





Objective





- Vital Signs/Intake and Output


Vital Signs (last 24 hours): 


 











Temp Pulse Resp BP Pulse Ox


 


 98.1 F   78   20   134/70   97 


 


 10/15/16 08:09  10/15/16 08:09  10/15/16 08:09  10/15/16 08:09  10/15/16 08:09











- Medications


Medications: 


 Current Medications





Aspirin (Ecotrin)  81 mg PO DAILY [facility]


   Last Admin: 10/14/16 09:14 Dose:  81 mg


Divalproex Sodium (Depakote Dr(*Bid*))  500 mg PO BID [facility]


   Last Admin: 10/14/16 17:26 Dose:  500 mg


Enalapril Maleate (Vasotec)  10 mg PO DAILY [facility]


   Last Admin: 10/14/16 09:13 Dose:  Not Given


Famotidine (Pepcid)  20 mg PO BID [facility]


   Last Admin: 10/14/16 17:26 Dose:  20 mg


Ibuprofen (Motrin Tab)  600 mg PO Q8 PRN


   PRN Reason: Pain, moderate (4-7)


   Last Admin: 09/18/16 08:56 Dose:  600 mg


Metoprolol Tartrate (Lopressor)  50 mg PO Q12 [facility]


   Last Admin: 10/14/16 21:12 Dose:  50 mg


Quetiapine Fumarate (Seroquel)  25 mg PO HS [facility]


   Last Admin: 10/14/16 21:15 Dose:  25 mg











- Labs


Labs: 


 





 08/02/16 08:10 





 08/02/16 08:10 





 











PT  11.2 SECONDS (9.4-12.0)   12/14/15  05:20    


 


INR  1.05  (0.89-1.20)   12/14/15  05:20    


 


APTT  36.2 SECONDS (23.1-32.0)  H  12/14/15  05:20    














- Constitutional


Appears: Well, Non-toxic, No Acute Distress





- Head Exam


Head Exam: ATRAUMATIC, NORMAL INSPECTION, NORMOCEPHALIC





- Eye Exam


Eye Exam: EOMI, Normal appearance, PERRL


Pupil Exam: NORMAL ACCOMODATION, PERRL





- ENT Exam


ENT Exam: Mucous Membranes Moist, Normal Exam





- Neck Exam


Neck Exam: Full ROM, Normal Inspection.  absent: Lymphadenopathy





- Respiratory Exam


Respiratory Exam: Clear to Ausculation Bilateral, NORMAL BREATHING PATTERN





- Cardiovascular Exam


Cardiovascular Exam: REGULAR RHYTHM, +S1, +S2.  absent: Murmur





- GI/Abdominal Exam


GI & Abdominal Exam: Soft, Normal Bowel Sounds.  absent: Tenderness





- Extremities Exam


Extremities Exam: Full ROM, Normal Capillary Refill, Normal Inspection.  absent

: Joint Swelling, Pedal Edema





- Back Exam


Back Exam: NORMAL INSPECTION





- Neurological Exam


Neurological Exam: Alert, Awake, CN II-XII Intact, Normal Gait, Oriented x3





- Psychiatric Exam


Psychiatric exam: Normal Affect, Normal Mood





- Skin


Skin Exam: Dry, Intact, Normal Color, Warm





Assessment and Plan


(1) DVT prophylaxis


Status: Chronic





(2) CAD (coronary artery disease)


Status: Chronic





(3) Smoking


Status: Chronic





(4) Low back pain


Status: Chronic





(5) Scrotal edema


Status: Chronic





(6) Prediabetes


Status: Chronic





(7) Neurocognitive disorder


Status: Chronic








- Assessment and Plan (Free Text)


Assessment: 


(1) DVT prophylaxis


Assessment & Plan: 


scd and ae hose


ambulation


Status: Chronic





(2) CAD (coronary artery disease)


Assessment & Plan: 


cont meds


Status: Chronic





(3) Smoking


Assessment & Plan: 


nicoderm


Status: Chronic





(4) Low back pain


Assessment & Plan: 


ibuprofen prn


Status: Chronic





(5) Scrotal edema


Status: Chronic





(6) Prediabetes


Assessment & Plan: 


dietary


Status: Chronic





(7) Neurocognitive disorder


Assessment & Plan: 


cont med


spendign placement


Status: Chronic

## 2016-10-16 RX ADMIN — DIVALPROEX SODIUM SCH MG: 250 TABLET, DELAYED RELEASE ORAL at 08:21

## 2016-10-16 RX ADMIN — DIVALPROEX SODIUM SCH MG: 250 TABLET, DELAYED RELEASE ORAL at 17:24

## 2016-10-16 NOTE — CP.PCM.PN
Subjective





- Date & Time of Evaluation


Date of Evaluation: 10/16/16


Time of Evaluation: 15:43





- Subjective


Subjective: 


no complinats/distress


no f/c, n/v/d


pending placement





Objective





- Vital Signs/Intake and Output


Vital Signs (last 24 hours): 


 











Temp Pulse Resp BP Pulse Ox


 


 97.9 F   74   20   118/73   99 


 


 10/16/16 07:58  10/16/16 07:58  10/16/16 07:58  10/16/16 08:21  10/16/16 07:58











- Medications


Medications: 


 Current Medications





Aspirin (Ecotrin)  81 mg PO DAILY [facility]


   Last Admin: 10/16/16 08:22 Dose:  81 mg


Divalproex Sodium (Depakote Dr(*Bid*))  500 mg PO BID [facility]


   Last Admin: 10/16/16 08:21 Dose:  500 mg


Enalapril Maleate (Vasotec)  10 mg PO DAILY [facility]


   Last Admin: 10/16/16 08:22 Dose:  10 mg


Famotidine (Pepcid)  20 mg PO BID [facility]


   Last Admin: 10/16/16 08:21 Dose:  20 mg


Ibuprofen (Motrin Tab)  600 mg PO Q8 PRN


   PRN Reason: Pain, moderate (4-7)


   Last Admin: 09/18/16 08:56 Dose:  600 mg


Metoprolol Tartrate (Lopressor)  50 mg PO Q12 [facility]


   Last Admin: 10/16/16 08:21 Dose:  50 mg


Quetiapine Fumarate (Seroquel)  25 mg PO HS [facility]


   Last Admin: 10/15/16 21:58 Dose:  25 mg











- Labs


Labs: 


 





 08/02/16 08:10 





 08/02/16 08:10 





 











PT  11.2 SECONDS (9.4-12.0)   12/14/15  05:20    


 


INR  1.05  (0.89-1.20)   12/14/15  05:20    


 


APTT  36.2 SECONDS (23.1-32.0)  H  12/14/15  05:20    














- Constitutional


Appears: Well, Non-toxic, No Acute Distress





- Head Exam


Head Exam: ATRAUMATIC, NORMAL INSPECTION, NORMOCEPHALIC





- Eye Exam


Eye Exam: EOMI, Normal appearance, PERRL


Pupil Exam: NORMAL ACCOMODATION, PERRL





- ENT Exam


ENT Exam: Mucous Membranes Moist, Normal Exam





- Neck Exam


Neck Exam: Full ROM, Normal Inspection.  absent: Lymphadenopathy





- Respiratory Exam


Respiratory Exam: Clear to Ausculation Bilateral, NORMAL BREATHING PATTERN





- Cardiovascular Exam


Cardiovascular Exam: REGULAR RHYTHM, +S1, +S2.  absent: Murmur





- GI/Abdominal Exam


GI & Abdominal Exam: Soft, Normal Bowel Sounds.  absent: Tenderness





- Extremities Exam


Extremities Exam: Full ROM, Normal Capillary Refill, Normal Inspection.  absent

: Joint Swelling, Pedal Edema





- Back Exam


Back Exam: NORMAL INSPECTION





- Neurological Exam


Neurological Exam: Alert, Awake, CN II-XII Intact, Normal Gait, Oriented x3





- Psychiatric Exam


Psychiatric exam: Normal Affect, Normal Mood





- Skin


Skin Exam: Dry, Intact, Normal Color, Warm





Assessment and Plan


(1) DVT prophylaxis


Status: Chronic





(2) CAD (coronary artery disease)


Status: Chronic





(3) Smoking


Status: Chronic





(4) Low back pain


Status: Chronic





(5) Scrotal edema


Status: Chronic





(6) Prediabetes


Status: Chronic





(7) Neurocognitive disorder


Status: Chronic








- Assessment and Plan (Free Text)


Assessment: 


(1) DVT prophylaxis


Assessment & Plan: 


scd and ae hsoe


ambualtion


Status: Chronic





(2) CAD (coronary artery disease)


Assessment & Plan: 


cont meds


s/p cardiac arrest


Status: Chronic





(3) Smoking


Assessment & Plan: 


wean off nicoderm


Status: Chronic





(4) Low back pain


Assessment & Plan: 


ibuprofen rpn


Status: Chronic





(5) Scrotal edema


Status: Chronic





(6) Prediabetes


Assessment & Plan: 


dietary control


actiovity


Status: Chronic





(7) Neurocognitive disorder


Assessment & Plan: 


cont meds


epdnign placement


guardian in place


Status: Chronic

## 2016-10-17 RX ADMIN — DIVALPROEX SODIUM SCH MG: 250 TABLET, DELAYED RELEASE ORAL at 09:06

## 2016-10-17 RX ADMIN — DIVALPROEX SODIUM SCH MG: 250 TABLET, DELAYED RELEASE ORAL at 16:21

## 2016-10-17 NOTE — CP.PCM.PN
Subjective





- Date & Time of Evaluation


Date of Evaluation: 10/17/16


Time of Evaluation: 07:30





- Subjective


Subjective: 


no f/c, n/v/d


no complaint/distress


pending placement





Objective





- Vital Signs/Intake and Output


Vital Signs (last 24 hours): 


 











Temp Pulse Resp BP Pulse Ox


 


 97.6 F   76   18   109/68   99 


 


 10/17/16 08:01  10/17/16 08:01  10/17/16 08:01  10/17/16 08:01  10/17/16 08:01











- Medications


Medications: 


 Current Medications





Aspirin (Ecotrin)  81 mg PO DAILY [facility]


   Last Admin: 10/16/16 08:22 Dose:  81 mg


Divalproex Sodium (Depakote Dr(*Bid*))  500 mg PO BID [facility]


   Last Admin: 10/16/16 17:24 Dose:  500 mg


Enalapril Maleate (Vasotec)  10 mg PO DAILY [facility]


   Last Admin: 10/16/16 08:22 Dose:  10 mg


Famotidine (Pepcid)  20 mg PO BID [facility]


   Last Admin: 10/16/16 17:24 Dose:  20 mg


Ibuprofen (Motrin Tab)  600 mg PO Q8 PRN


   PRN Reason: Pain, moderate (4-7)


   Last Admin: 09/18/16 08:56 Dose:  600 mg


Metoprolol Tartrate (Lopressor)  50 mg PO Q12 [facility]


   Last Admin: 10/16/16 21:23 Dose:  50 mg


Quetiapine Fumarate (Seroquel)  25 mg PO HS [facility]


   Last Admin: 10/16/16 21:23 Dose:  25 mg











- Labs


Labs: 


 





 08/02/16 08:10 





 08/02/16 08:10 





 











PT  11.2 SECONDS (9.4-12.0)   12/14/15  05:20    


 


INR  1.05  (0.89-1.20)   12/14/15  05:20    


 


APTT  36.2 SECONDS (23.1-32.0)  H  12/14/15  05:20    














- Constitutional


Appears: Well, Non-toxic, No Acute Distress





- Head Exam


Head Exam: ATRAUMATIC, NORMAL INSPECTION, NORMOCEPHALIC





- Eye Exam


Eye Exam: EOMI, Normal appearance, PERRL


Pupil Exam: NORMAL ACCOMODATION, PERRL





- ENT Exam


ENT Exam: Mucous Membranes Moist, Normal Exam





- Neck Exam


Neck Exam: Full ROM, Normal Inspection.  absent: Lymphadenopathy





- Respiratory Exam


Respiratory Exam: Clear to Ausculation Bilateral, NORMAL BREATHING PATTERN





- Cardiovascular Exam


Cardiovascular Exam: REGULAR RHYTHM, RRR, +S1, +S2.  absent: Murmur





- GI/Abdominal Exam


GI & Abdominal Exam: Soft, Normal Bowel Sounds.  absent: Tenderness





- Extremities Exam


Extremities Exam: Full ROM, Normal Capillary Refill, Normal Inspection.  absent

: Joint Swelling, Pedal Edema





- Back Exam


Back Exam: NORMAL INSPECTION





- Neurological Exam


Neurological Exam: Alert, Awake, CN II-XII Intact, Normal Gait, Oriented x3





- Psychiatric Exam


Psychiatric exam: Normal Affect, Normal Mood





- Skin


Skin Exam: Dry, Intact, Normal Color, Warm





Assessment and Plan


(1) DVT prophylaxis


Status: Chronic





(2) CAD (coronary artery disease)


Status: Chronic





(3) Smoking


Status: Chronic





(4) Low back pain


Status: Chronic





(5) Scrotal edema


Status: Chronic





(6) Prediabetes


Status: Chronic





(7) Neurocognitive disorder


Status: Chronic








- Assessment and Plan (Free Text)


Assessment: 


(1) DVT prophylaxis


Assessment & Plan: 


scd and ae hsoe


ambualtion


Status: Chronic





(2) CAD (coronary artery disease)


Assessment & Plan: 


cont meds


s/p cardiac arrest


Status: Chronic





(3) Smoking


Assessment & Plan: 


wean off nicoderm


Status: Chronic





(4) Low back pain


Assessment & Plan: 


ibuprofen rpn


Status: Chronic





(5) Scrotal edema


Status: Chronic





(6) Prediabetes


Assessment & Plan: 


dietary control


actiovity


Status: Chronic





(7) Neurocognitive disorder


Assessment & Plan: 


cont meds


epdnign placement


guardian in place


Status: Chronic

## 2016-10-18 RX ADMIN — DIVALPROEX SODIUM SCH MG: 250 TABLET, DELAYED RELEASE ORAL at 16:52

## 2016-10-18 RX ADMIN — DIVALPROEX SODIUM SCH MG: 250 TABLET, DELAYED RELEASE ORAL at 09:53

## 2016-10-18 NOTE — PN
DATE: 10/18/2016



The patient evaluated in bed.  No complaints.  No distress.  No fever, chills, nausea, vomiting, diar
madeline.  Still remains pending placement.  Case was discussed with Tete in case management.



REVIEW OF SYSTEMS:  Negative at this time.



PHYSICAL EXAMINATION:

GENERAL:  Alert, oriented to baseline.

HEART:  Regular rate and rhythm.  No murmurs, rubs, or gallops.

LUNGS:  Clear to auscultation bilaterally.

ABDOMEN:  Soft, nontender.  Bowel sounds x 4.

EXTREMITIES:  Distal pulses, motor sensation intact.  Cap refill is brisk.



DIAGNOSES AND PLAN:

1.  Coronary artery disease, status post cardiac arrest.  Continue medications.

2.  Neurocognitive disorder.  Continue medication.  Pending placement and guardian in place.

3.  Deep venous thrombosis prophylaxis, SCD and AE hose, ambulation.

4.  Smoking cessation.  The patient has been off Nicoderm and has been doing well.

5.  Chronic intermittent low back pain.  Continue ibuprofen p.r.n.

6.  Prediabetes, dietary control.  We continue to follow until placement is obtained.





__________________________________________

Fan SILVER







cc:



DD: 10/18/2016 07:55:56  [id number]

TT: 10/18/2016 08:24:17

Confirmation # [id number]

Dictation # [id number]

hn

## 2016-10-18 NOTE — CP.PCM.PN
Subjective





- Date & Time of Evaluation


Date of Evaluation: 10/18/16


Time of Evaluation: 07:11





- Subjective


Subjective: 


dictated note [id number]


pending placement


no f/c, n/v/d


no compalitns/distress





Objective





- Vital Signs/Intake and Output


Vital Signs (last 24 hours): 


 











Temp Pulse Resp BP Pulse Ox


 


 97.9 F   83   18   138/87   96 


 


 10/18/16 00:33  10/18/16 00:33  10/18/16 00:33  10/18/16 00:33  10/18/16 00:33











- Medications


Medications: 


 Current Medications





Aspirin (Ecotrin)  81 mg PO DAILY [facility]


   Last Admin: 10/17/16 09:07 Dose:  81 mg


Divalproex Sodium (Depakote Dr(*Bid*))  500 mg PO BID [facility]


   Last Admin: 10/17/16 16:21 Dose:  500 mg


Enalapril Maleate (Vasotec)  10 mg PO DAILY [facility]


   Last Admin: 10/17/16 09:07 Dose:  10 mg


Famotidine (Pepcid)  20 mg PO BID [facility]


   Last Admin: 10/17/16 16:22 Dose:  20 mg


Ibuprofen (Motrin Tab)  600 mg PO Q8 PRN


   PRN Reason: Pain, moderate (4-7)


   Last Admin: 09/18/16 08:56 Dose:  600 mg


Metoprolol Tartrate (Lopressor)  50 mg PO Q12 [facility]


   Last Admin: 10/17/16 23:02 Dose:  50 mg


Quetiapine Fumarate (Seroquel)  25 mg PO HS [facility]


   Last Admin: 10/17/16 23:02 Dose:  25 mg











- Labs


Labs: 


 





 08/02/16 08:10 





 08/02/16 08:10 





 











PT  11.2 SECONDS (9.4-12.0)   12/14/15  05:20    


 


INR  1.05  (0.89-1.20)   12/14/15  05:20    


 


APTT  36.2 SECONDS (23.1-32.0)  H  12/14/15  05:20    














Assessment and Plan


(1) DVT prophylaxis


Status: Chronic





(2) CAD (coronary artery disease)


Status: Chronic





(3) Smoking


Status: Chronic





(4) Low back pain


Status: Chronic





(5) Scrotal edema


Status: Chronic





(6) Prediabetes


Status: Chronic





(7) Neurocognitive disorder


Status: Chronic

## 2016-10-19 RX ADMIN — DIVALPROEX SODIUM SCH MG: 250 TABLET, DELAYED RELEASE ORAL at 16:44

## 2016-10-19 RX ADMIN — DIVALPROEX SODIUM SCH MG: 250 TABLET, DELAYED RELEASE ORAL at 09:36

## 2016-10-19 NOTE — CP.PCM.PN
Subjective





- Date & Time of Evaluation


Date of Evaluation: 10/19/16


Time of Evaluation: 07:20





- Subjective


Subjective: 


no complaints/distress


no f/cm, n/v/d


pending placement





Objective





- Vital Signs/Intake and Output


Vital Signs (last 24 hours): 


 











Temp Pulse Resp BP Pulse Ox


 


 97.0 F L  78   19   128/74   99 


 


 10/19/16 00:00  10/19/16 00:00  10/19/16 00:00  10/19/16 00:00  10/19/16 00:00











- Medications


Medications: 


 Current Medications





Aspirin (Ecotrin)  81 mg PO DAILY [facility]


   Last Admin: 10/18/16 09:54 Dose:  81 mg


Divalproex Sodium (Depakote Dr(*Bid*))  500 mg PO BID [facility]


   Last Admin: 10/18/16 16:52 Dose:  500 mg


Enalapril Maleate (Vasotec)  10 mg PO DAILY [facility]


   Last Admin: 10/18/16 10:00 Dose:  10 mg


Famotidine (Pepcid)  20 mg PO BID [facility]


   Last Admin: 10/18/16 16:52 Dose:  20 mg


Ibuprofen (Motrin Tab)  600 mg PO Q8 PRN


   PRN Reason: Pain, moderate (4-7)


   Last Admin: 09/18/16 08:56 Dose:  600 mg


Metoprolol Tartrate (Lopressor)  50 mg PO Q12 [facility]


   Last Admin: 10/18/16 21:28 Dose:  50 mg


Quetiapine Fumarate (Seroquel)  25 mg PO HS [facility]


   Last Admin: 10/18/16 21:28 Dose:  25 mg











- Labs


Labs: 


 





 08/02/16 08:10 





 08/02/16 08:10 





 











PT  11.2 SECONDS (9.4-12.0)   12/14/15  05:20    


 


INR  1.05  (0.89-1.20)   12/14/15  05:20    


 


APTT  36.2 SECONDS (23.1-32.0)  H  12/14/15  05:20    














- Constitutional


Appears: Well, Non-toxic, No Acute Distress





- Head Exam


Head Exam: ATRAUMATIC, NORMAL INSPECTION, NORMOCEPHALIC





- Eye Exam


Eye Exam: EOMI, Normal appearance, PERRL


Pupil Exam: NORMAL ACCOMODATION, PERRL





- ENT Exam


ENT Exam: Mucous Membranes Moist, Normal Exam





- Neck Exam


Neck Exam: Full ROM, Normal Inspection.  absent: Lymphadenopathy





- Respiratory Exam


Respiratory Exam: Clear to Ausculation Bilateral, NORMAL BREATHING PATTERN





- Cardiovascular Exam


Cardiovascular Exam: REGULAR RHYTHM, RRR, +S1, +S2.  absent: Murmur





- GI/Abdominal Exam


GI & Abdominal Exam: Soft, Normal Bowel Sounds.  absent: Tenderness





- Extremities Exam


Extremities Exam: Full ROM, Normal Capillary Refill, Normal Inspection.  absent

: Joint Swelling, Pedal Edema





- Back Exam


Back Exam: NORMAL INSPECTION





- Neurological Exam


Neurological Exam: Alert, Awake, CN II-XII Intact, Normal Gait, Oriented x3





- Psychiatric Exam


Psychiatric exam: Normal Affect, Normal Mood





- Skin


Skin Exam: Dry, Intact, Normal Color, Warm





Assessment and Plan


(1) DVT prophylaxis


Status: Chronic





(2) CAD (coronary artery disease)


Status: Chronic





(3) Smoking


Status: Chronic





(4) Low back pain


Status: Chronic





(5) Scrotal edema


Status: Chronic





(6) Prediabetes


Status: Chronic





(7) Neurocognitive disorder


Status: Chronic








- Assessment and Plan (Free Text)


Assessment: 


(1) DVT prophylaxis


Assessment & Plan: 


scd and ae hose


ambulation


Status: Chronic





(2) CAD (coronary artery disease)


Assessment & Plan: 


cont meds


Status: Chronic





(3) Smoking


Assessment & Plan: 


nicoderm


Status: Chronic





(4) Low back pain


Assessment & Plan: 


ibuprofen prn


Status: Chronic





(5) Scrotal edema


Status: Chronic





(6) Prediabetes


Assessment & Plan: 


dietary


Status: Chronic





(7) Neurocognitive disorder


Assessment & Plan: 


cont med


spendign placement


Status: Chronic

## 2016-10-20 RX ADMIN — DIVALPROEX SODIUM SCH MG: 250 TABLET, DELAYED RELEASE ORAL at 17:05

## 2016-10-20 RX ADMIN — DIVALPROEX SODIUM SCH MG: 250 TABLET, DELAYED RELEASE ORAL at 10:55

## 2016-10-20 NOTE — CP.PCM.PN
Subjective





- Date & Time of Evaluation


Date of Evaluation: 10/20/16


Time of Evaluation: 07:18





- Subjective


Subjective: 


no complauitns/distress


no f/c, n/v/d


pendign placement





Objective





- Vital Signs/Intake and Output


Vital Signs (last 24 hours): 


 











Temp Pulse Resp BP Pulse Ox


 


 97.8 F   92 H  19   111/65   97 


 


 10/20/16 00:25  10/20/16 00:25  10/20/16 00:25  10/20/16 00:25  10/20/16 00:25











- Medications


Medications: 


 Current Medications





Aspirin (Ecotrin)  81 mg PO DAILY [facility]


   Last Admin: 10/19/16 09:40 Dose:  81 mg


Divalproex Sodium (Depakote Dr(*Bid*))  500 mg PO BID [facility]


   Last Admin: 10/19/16 16:44 Dose:  500 mg


Enalapril Maleate (Vasotec)  10 mg PO DAILY [facility]


   Last Admin: 10/19/16 09:38 Dose:  10 mg


Famotidine (Pepcid)  20 mg PO BID [facility]


   Last Admin: 10/19/16 16:44 Dose:  20 mg


Ibuprofen (Motrin Tab)  600 mg PO Q8 PRN


   PRN Reason: Pain, moderate (4-7)


   Last Admin: 09/18/16 08:56 Dose:  600 mg


Metoprolol Tartrate (Lopressor)  50 mg PO Q12 [facility]


   Last Admin: 10/19/16 21:07 Dose:  50 mg


Quetiapine Fumarate (Seroquel)  25 mg PO HS [facility]


   Last Admin: 10/19/16 21:07 Dose:  25 mg











- Labs


Labs: 


 





 08/02/16 08:10 





 08/02/16 08:10 





 











PT  11.2 SECONDS (9.4-12.0)   12/14/15  05:20    


 


INR  1.05  (0.89-1.20)   12/14/15  05:20    


 


APTT  36.2 SECONDS (23.1-32.0)  H  12/14/15  05:20    














- Constitutional


Appears: Well, Non-toxic, No Acute Distress





- Head Exam


Head Exam: ATRAUMATIC, NORMAL INSPECTION, NORMOCEPHALIC





- Eye Exam


Eye Exam: EOMI, Normal appearance, PERRL


Pupil Exam: NORMAL ACCOMODATION, PERRL





- ENT Exam


ENT Exam: Mucous Membranes Moist, Normal Exam





- Neck Exam


Neck Exam: Full ROM, Normal Inspection.  absent: Lymphadenopathy





- Respiratory Exam


Respiratory Exam: Clear to Ausculation Bilateral, NORMAL BREATHING PATTERN





- Cardiovascular Exam


Cardiovascular Exam: REGULAR RHYTHM, RRR, +S1, +S2.  absent: Murmur





- GI/Abdominal Exam


GI & Abdominal Exam: Soft, Normal Bowel Sounds.  absent: Tenderness





-  Exam


Bimanual exam: NORMAL BIMANUAL EXAM





- Extremities Exam


Extremities Exam: Full ROM, Normal Capillary Refill, Normal Inspection.  absent

: Joint Swelling, Pedal Edema





- Back Exam


Back Exam: NORMAL INSPECTION





- Neurological Exam


Neurological Exam: Alert, Awake, CN II-XII Intact, Normal Gait, Oriented x3





- Psychiatric Exam


Psychiatric exam: Normal Affect, Normal Mood





- Skin


Skin Exam: Dry, Intact, Normal Color, Warm





Assessment and Plan


(1) DVT prophylaxis


Status: Chronic





(2) CAD (coronary artery disease)


Status: Chronic





(3) Smoking


Status: Chronic





(4) Low back pain


Status: Chronic





(5) Scrotal edema


Status: Chronic





(6) Prediabetes


Status: Chronic





(7) Neurocognitive disorder


Status: Chronic








- Assessment and Plan (Free Text)


Assessment: 


(1) DVT prophylaxis


Assessment & Plan: 


scd and ae hsoe


ambualtion


Status: Chronic





(2) CAD (coronary artery disease)


Assessment & Plan: 


cont meds


s/p cardiac arrest


Status: Chronic





(3) Smoking


Assessment & Plan: 


wean off nicoderm


Status: Chronic





(4) Low back pain


Assessment & Plan: 


ibuprofen rpn


Status: Chronic





(5) Scrotal edema


Status: Chronic





(6) Prediabetes


Assessment & Plan: 


dietary control


actiovity


Status: Chronic





(7) Neurocognitive disorder


Assessment & Plan: 


cont meds


epdnign placement


guardian in place


Status: Chronic

## 2016-10-21 RX ADMIN — DIVALPROEX SODIUM SCH MG: 250 TABLET, DELAYED RELEASE ORAL at 17:53

## 2016-10-21 RX ADMIN — DIVALPROEX SODIUM SCH MG: 250 TABLET, DELAYED RELEASE ORAL at 08:42

## 2016-10-21 NOTE — CP.PCM.PN
Subjective





- Date & Time of Evaluation


Date of Evaluation: 10/21/16


Time of Evaluation: 08:35





- Subjective


Subjective: 


 no complaints/distress


no f/c, n/v/d


pending placement





Objective





- Vital Signs/Intake and Output


Vital Signs (last 24 hours): 


 











Temp Pulse Resp BP Pulse Ox


 


 97.3 F L  66   20   110/70   100 


 


 10/21/16 08:21  10/21/16 08:21  10/21/16 08:21  10/21/16 08:21  10/21/16 08:21











- Medications


Medications: 


 Current Medications





Aspirin (Ecotrin)  81 mg PO DAILY [facility]


   Last Admin: 10/20/16 10:56 Dose:  81 mg


Divalproex Sodium (Depakote Dr(*Bid*))  500 mg PO BID [facility]


   Last Admin: 10/20/16 17:05 Dose:  500 mg


Enalapril Maleate (Vasotec)  10 mg PO DAILY [facility]


   Last Admin: 10/20/16 11:13 Dose:  10 mg


Famotidine (Pepcid)  20 mg PO BID [facility]


   Last Admin: 10/20/16 17:05 Dose:  20 mg


Ibuprofen (Motrin Tab)  600 mg PO Q8 PRN


   PRN Reason: Pain, moderate (4-7)


   Last Admin: 09/18/16 08:56 Dose:  600 mg


Metoprolol Tartrate (Lopressor)  50 mg PO Q12 [facility]


   Last Admin: 10/20/16 22:03 Dose:  Not Given


Quetiapine Fumarate (Seroquel)  25 mg PO HS [facility]


   Last Admin: 10/20/16 22:03 Dose:  25 mg











- Labs


Labs: 


 





 08/02/16 08:10 





 08/02/16 08:10 





 











PT  11.2 SECONDS (9.4-12.0)   12/14/15  05:20    


 


INR  1.05  (0.89-1.20)   12/14/15  05:20    


 


APTT  36.2 SECONDS (23.1-32.0)  H  12/14/15  05:20    














- Constitutional


Appears: Well, Non-toxic, No Acute Distress





- Head Exam


Head Exam: ATRAUMATIC, NORMAL INSPECTION, NORMOCEPHALIC





- Eye Exam


Eye Exam: EOMI, Normal appearance, PERRL


Pupil Exam: NORMAL ACCOMODATION, PERRL





- ENT Exam


ENT Exam: Mucous Membranes Moist, Normal Exam





- Neck Exam


Neck Exam: Full ROM, Normal Inspection.  absent: Lymphadenopathy





- Respiratory Exam


Respiratory Exam: Clear to Ausculation Bilateral, NORMAL BREATHING PATTERN





- Cardiovascular Exam


Cardiovascular Exam: REGULAR RHYTHM, RRR, +S1, +S2.  absent: Murmur





- GI/Abdominal Exam


GI & Abdominal Exam: Soft, Normal Bowel Sounds.  absent: Tenderness





- Extremities Exam


Extremities Exam: Full ROM, Normal Capillary Refill, Normal Inspection.  absent

: Joint Swelling, Pedal Edema





- Back Exam


Back Exam: Full ROM, NORMAL INSPECTION





- Neurological Exam


Neurological Exam: Alert, Awake, CN II-XII Intact, Normal Gait, Oriented x3





- Psychiatric Exam


Psychiatric exam: Normal Affect, Normal Mood





- Skin


Skin Exam: Dry, Intact, Normal Color, Warm





Assessment and Plan


(1) DVT prophylaxis


Status: Chronic





(2) CAD (coronary artery disease)


Status: Chronic





(3) Smoking


Status: Chronic





(4) Low back pain


Status: Chronic





(5) Scrotal edema


Status: Chronic





(6) Prediabetes


Status: Chronic





(7) Neurocognitive disorder


Status: Chronic








- Assessment and Plan (Free Text)


Assessment: 


(1) DVT prophylaxis


Assessment & Plan: 


scd and ae hsoe


ambualtion


Status: Chronic





(2) CAD (coronary artery disease)


Assessment & Plan: 


cont meds


s/p cardiac arrest


Status: Chronic





(3) Smoking


Assessment & Plan: 


wean off nicoderm


Status: Chronic





(4) Low back pain


Assessment & Plan: 


ibuprofen rpn


Status: Chronic





(5) Scrotal edema


Status: Chronic





(6) Prediabetes


Assessment & Plan: 


dietary control


actiovity


Status: Chronic





(7) Neurocognitive disorder


Assessment & Plan: 


cont meds


epdnign placement


guardian in place


Status: Chronic

## 2016-10-22 RX ADMIN — DIVALPROEX SODIUM SCH MG: 250 TABLET, DELAYED RELEASE ORAL at 09:32

## 2016-10-22 RX ADMIN — DIVALPROEX SODIUM SCH MG: 250 TABLET, DELAYED RELEASE ORAL at 16:13

## 2016-10-22 NOTE — CP.PCM.PN
Subjective





- Date & Time of Evaluation


Date of Evaluation: 10/22/16


Time of Evaluation: 07:14





- Subjective


Subjective: 


no f/c, n/v/d


no complaints/distress


pedingplacement





Objective





- Vital Signs/Intake and Output


Vital Signs (last 24 hours): 


 











Temp Pulse Resp BP Pulse Ox


 


 97.9 F   77   18   99/62 L  96 


 


 10/22/16 00:00  10/22/16 00:00  10/22/16 00:00  10/22/16 00:00  10/22/16 00:00











- Medications


Medications: 


 Current Medications





Aspirin (Ecotrin)  81 mg PO DAILY [facility]


   Last Admin: 10/21/16 08:42 Dose:  81 mg


Divalproex Sodium (Depakote Dr(*Bid*))  500 mg PO BID [facility]


   Last Admin: 10/21/16 17:53 Dose:  500 mg


Enalapril Maleate (Vasotec)  10 mg PO DAILY [facility]


   Last Admin: 10/21/16 08:43 Dose:  10 mg


Famotidine (Pepcid)  20 mg PO BID [facility]


   Last Admin: 10/21/16 17:52 Dose:  20 mg


Ibuprofen (Motrin Tab)  600 mg PO Q8 PRN


   PRN Reason: Pain, moderate (4-7)


   Last Admin: 09/18/16 08:56 Dose:  600 mg


Metoprolol Tartrate (Lopressor)  50 mg PO Q12 [facility]


   Last Admin: 10/21/16 20:23 Dose:  50 mg


Quetiapine Fumarate (Seroquel)  25 mg PO HS [facility]


   Last Admin: 10/21/16 21:19 Dose:  25 mg











- Labs


Labs: 


 





 08/02/16 08:10 





 08/02/16 08:10 





 











PT  11.2 SECONDS (9.4-12.0)   12/14/15  05:20    


 


INR  1.05  (0.89-1.20)   12/14/15  05:20    


 


APTT  36.2 SECONDS (23.1-32.0)  H  12/14/15  05:20    














- Constitutional


Appears: Well, Non-toxic, No Acute Distress





- Head Exam


Head Exam: ATRAUMATIC, NORMAL INSPECTION, NORMOCEPHALIC





- Eye Exam


Eye Exam: EOMI, Normal appearance, PERRL


Pupil Exam: NORMAL ACCOMODATION, PERRL





- ENT Exam


ENT Exam: Mucous Membranes Moist, Normal Exam





- Neck Exam


Neck Exam: Full ROM, Normal Inspection.  absent: Lymphadenopathy





- Respiratory Exam


Respiratory Exam: Clear to Ausculation Bilateral, NORMAL BREATHING PATTERN





- Cardiovascular Exam


Cardiovascular Exam: REGULAR RHYTHM, RRR, +S1, +S2.  absent: Murmur





- GI/Abdominal Exam


GI & Abdominal Exam: Soft, Normal Bowel Sounds.  absent: Tenderness





- Extremities Exam


Extremities Exam: Full ROM, Normal Capillary Refill, Normal Inspection.  absent

: Joint Swelling, Pedal Edema





- Back Exam


Back Exam: NORMAL INSPECTION





- Neurological Exam


Neurological Exam: Alert, Awake, CN II-XII Intact, Normal Gait, Oriented x3





- Psychiatric Exam


Psychiatric exam: Normal Affect, Normal Mood





- Skin


Skin Exam: Dry, Intact, Normal Color, Warm





Assessment and Plan


(1) DVT prophylaxis


Status: Chronic





(2) CAD (coronary artery disease)


Status: Chronic





(3) Smoking


Status: Chronic





(4) Low back pain


Status: Chronic





(5) Scrotal edema


Status: Chronic





(6) Prediabetes


Status: Chronic





(7) Neurocognitive disorder


Status: Chronic








- Assessment and Plan (Free Text)


Assessment: 


(1) DVT prophylaxis


Assessment & Plan: 


scd and ae hsoe


ambualtion


Status: Chronic





(2) CAD (coronary artery disease)


Assessment & Plan: 


cont meds


s/p cardiac arrest


Status: Chronic





(3) Smoking


Assessment & Plan: 


wean off nicoderm


Status: Chronic





(4) Low back pain


Assessment & Plan: 


ibuprofen rpn


Status: Chronic





(5) Scrotal edema


Status: Chronic





(6) Prediabetes


Assessment & Plan: 


dietary control


actiovity


Status: Chronic





(7) Neurocognitive disorder


Assessment & Plan: 


cont meds


epdnign placement


guardian in place


Status: Chronic

## 2016-10-23 RX ADMIN — DIVALPROEX SODIUM SCH MG: 250 TABLET, DELAYED RELEASE ORAL at 09:15

## 2016-10-23 RX ADMIN — DIVALPROEX SODIUM SCH MG: 250 TABLET, DELAYED RELEASE ORAL at 17:21

## 2016-10-23 NOTE — CP.PCM.PN
Subjective





- Date & Time of Evaluation


Date of Evaluation: 10/23/16


Time of Evaluation: 13:45





- Subjective


Subjective: 


no complaints/distress


no f/c, n/v/d


pending placement





Objective





- Vital Signs/Intake and Output


Vital Signs (last 24 hours): 


 











Temp Pulse Resp BP Pulse Ox


 


 97.2 F L  70   18   92/59 L  100 


 


 10/23/16 09:38  10/23/16 09:38  10/23/16 09:38  10/23/16 09:38  10/23/16 09:38











- Medications


Medications: 


 Current Medications





Aspirin (Ecotrin)  81 mg PO DAILY [facility]


   Last Admin: 10/23/16 09:14 Dose:  81 mg


Divalproex Sodium (Depakote Dr(*Bid*))  500 mg PO BID [facility]


   Last Admin: 10/23/16 09:15 Dose:  500 mg


Enalapril Maleate (Vasotec)  10 mg PO DAILY [facility]


   Last Admin: 10/23/16 09:15 Dose:  10 mg


Famotidine (Pepcid)  20 mg PO BID [facility]


   Last Admin: 10/23/16 09:15 Dose:  20 mg


Ibuprofen (Motrin Tab)  600 mg PO Q8 PRN


   PRN Reason: Pain, moderate (4-7)


   Last Admin: 09/18/16 08:56 Dose:  600 mg


Metoprolol Tartrate (Lopressor)  50 mg PO Q12 [facility]


   Last Admin: 10/23/16 09:15 Dose:  50 mg


Quetiapine Fumarate (Seroquel)  25 mg PO HS [facility]


   Last Admin: 10/22/16 21:19 Dose:  25 mg











- Labs


Labs: 


 





 08/02/16 08:10 





 08/02/16 08:10 





 











PT  11.2 SECONDS (9.4-12.0)   12/14/15  05:20    


 


INR  1.05  (0.89-1.20)   12/14/15  05:20    


 


APTT  36.2 SECONDS (23.1-32.0)  H  12/14/15  05:20    














- Constitutional


Appears: Well, Non-toxic, No Acute Distress





- Head Exam


Head Exam: ATRAUMATIC, NORMAL INSPECTION, NORMOCEPHALIC





- Eye Exam


Eye Exam: EOMI, Normal appearance, PERRL


Pupil Exam: NORMAL ACCOMODATION, PERRL





- ENT Exam


ENT Exam: Mucous Membranes Moist, Normal Exam





- Neck Exam


Neck Exam: Full ROM, Normal Inspection.  absent: Lymphadenopathy





- Respiratory Exam


Respiratory Exam: Clear to Ausculation Bilateral, NORMAL BREATHING PATTERN





- Cardiovascular Exam


Cardiovascular Exam: REGULAR RHYTHM, +S1, +S2.  absent: Murmur





- GI/Abdominal Exam


GI & Abdominal Exam: Soft, Normal Bowel Sounds.  absent: Tenderness





- Extremities Exam


Extremities Exam: Full ROM, Normal Capillary Refill, Normal Inspection.  absent

: Joint Swelling, Pedal Edema





- Back Exam


Back Exam: Full ROM, NORMAL INSPECTION





- Neurological Exam


Neurological Exam: Alert, Awake, CN II-XII Intact, Normal Gait, Oriented x3





- Psychiatric Exam


Psychiatric exam: Normal Affect, Normal Mood





- Skin


Skin Exam: Dry, Intact, Normal Color, Warm





Assessment and Plan


(1) DVT prophylaxis


Status: Chronic





(2) CAD (coronary artery disease)


Status: Chronic





(3) Smoking


Status: Chronic





(4) Low back pain


Status: Chronic





(5) Scrotal edema


Status: Chronic





(6) Prediabetes


Status: Chronic





(7) Neurocognitive disorder


Status: Chronic








- Assessment and Plan (Free Text)


Assessment: 


(1) DVT prophylaxis


Assessment & Plan: 


scd and ae hsoe


ambualtion


Status: Chronic





(2) CAD (coronary artery disease)


Assessment & Plan: 


cont meds


s/p cardiac arrest


Status: Chronic





(3) Smoking


Assessment & Plan: 


wean off nicoderm


Status: Chronic





(4) Low back pain


Assessment & Plan: 


ibuprofen rpn


Status: Chronic





(5) Scrotal edema


Status: Chronic





(6) Prediabetes


Assessment & Plan: 


dietary control


actiovity


Status: Chronic





(7) Neurocognitive disorder


Assessment & Plan: 


cont meds


epdnign placement


guardian in place


Status: Chronic

## 2016-10-24 RX ADMIN — DIVALPROEX SODIUM SCH MG: 250 TABLET, DELAYED RELEASE ORAL at 08:11

## 2016-10-24 RX ADMIN — DIVALPROEX SODIUM SCH MG: 250 TABLET, DELAYED RELEASE ORAL at 16:38

## 2016-10-24 NOTE — CP.PCM.PN
Subjective





- Date & Time of Evaluation


Date of Evaluation: 10/24/16


Time of Evaluation: 07:54





- Subjective


Subjective: 


no complaints/distress


no f/c, n/v/d


pending placeent





Objective





- Vital Signs/Intake and Output


Vital Signs (last 24 hours): 


 











Temp Pulse Resp BP Pulse Ox


 


 98.6 F   75   18   119/68   96 


 


 10/24/16 00:37  10/24/16 00:37  10/24/16 00:37  10/24/16 00:37  10/24/16 00:37











- Medications


Medications: 


 Current Medications





Aspirin (Ecotrin)  81 mg PO DAILY [facility]


   Last Admin: 10/23/16 09:14 Dose:  81 mg


Divalproex Sodium (Depakote Dr(*Bid*))  500 mg PO BID [facility]


   Last Admin: 10/23/16 17:21 Dose:  500 mg


Enalapril Maleate (Vasotec)  10 mg PO DAILY [facility]


   Last Admin: 10/23/16 09:15 Dose:  10 mg


Famotidine (Pepcid)  20 mg PO BID [facility]


   Last Admin: 10/23/16 17:21 Dose:  20 mg


Ibuprofen (Motrin Tab)  600 mg PO Q8 PRN


   PRN Reason: Pain, moderate (4-7)


   Last Admin: 09/18/16 08:56 Dose:  600 mg


Metoprolol Tartrate (Lopressor)  50 mg PO Q12 [facility]


   Last Admin: 10/23/16 21:07 Dose:  50 mg


Quetiapine Fumarate (Seroquel)  25 mg PO HS [facility]


   Last Admin: 10/23/16 21:07 Dose:  25 mg











- Labs


Labs: 


 





 08/02/16 08:10 





 08/02/16 08:10 





 











PT  11.2 SECONDS (9.4-12.0)   12/14/15  05:20    


 


INR  1.05  (0.89-1.20)   12/14/15  05:20    


 


APTT  36.2 SECONDS (23.1-32.0)  H  12/14/15  05:20    














- Constitutional


Appears: Well, No Acute Distress





- Head Exam


Head Exam: ATRAUMATIC, NORMAL INSPECTION, NORMOCEPHALIC





- Eye Exam


Eye Exam: EOMI, Normal appearance, PERRL


Pupil Exam: NORMAL ACCOMODATION, PERRL





- ENT Exam


ENT Exam: Mucous Membranes Moist, Normal Exam





- Neck Exam


Neck Exam: Full ROM, Normal Inspection.  absent: Lymphadenopathy





- Respiratory Exam


Respiratory Exam: Clear to Ausculation Bilateral, NORMAL BREATHING PATTERN





- Cardiovascular Exam


Cardiovascular Exam: REGULAR RHYTHM, RRR, +S1, +S2.  absent: Murmur





- GI/Abdominal Exam


GI & Abdominal Exam: Soft, Normal Bowel Sounds.  absent: Tenderness





- Extremities Exam


Extremities Exam: Full ROM, Normal Capillary Refill, Normal Inspection.  absent

: Joint Swelling, Pedal Edema





- Back Exam


Back Exam: NORMAL INSPECTION





- Neurological Exam


Neurological Exam: Alert, Awake, CN II-XII Intact, Normal Gait, Oriented x3





- Psychiatric Exam


Psychiatric exam: Normal Affect, Normal Mood





- Skin


Skin Exam: Dry, Intact, Normal Color, Warm





Assessment and Plan


(1) DVT prophylaxis


Status: Chronic





(2) CAD (coronary artery disease)


Status: Chronic





(3) Smoking


Status: Chronic





(4) Low back pain


Status: Chronic





(5) Scrotal edema


Status: Chronic





(6) Prediabetes


Status: Chronic





(7) Neurocognitive disorder


Status: Chronic








- Assessment and Plan (Free Text)


Assessment: 


(1) DVT prophylaxis


Assessment & Plan: 


scd and ae hsoe


ambualtion


Status: Chronic





(2) CAD (coronary artery disease)


Assessment & Plan: 


cont meds


s/p cardiac arrest


Status: Chronic





(3) Smoking


Assessment & Plan: 


wean off nicoderm


Status: Chronic





(4) Low back pain


Assessment & Plan: 


ibuprofen rpn


Status: Chronic





(5) Scrotal edema


Status: Chronic





(6) Prediabetes


Assessment & Plan: 


dietary control


actiovity


Status: Chronic





(7) Neurocognitive disorder


Assessment & Plan: 


cont meds


epdnign placement


guardian in place


Status: Chronic

## 2016-10-25 RX ADMIN — DIVALPROEX SODIUM SCH MG: 250 TABLET, DELAYED RELEASE ORAL at 16:42

## 2016-10-25 RX ADMIN — DIVALPROEX SODIUM SCH MG: 250 TABLET, DELAYED RELEASE ORAL at 09:25

## 2016-10-25 NOTE — CP.PCM.PN
Subjective





- Date & Time of Evaluation


Date of Evaluation: 10/25/16


Time of Evaluation: 07:00





- Subjective


Subjective: 


no complaints/distress


no f/c, n/v/d


pending placement





Objective





- Vital Signs/Intake and Output


Vital Signs (last 24 hours): 


 











Temp Pulse Resp BP Pulse Ox


 


 98.1 F   79   18   144/70   99 


 


 10/24/16 16:41  10/24/16 21:13  10/24/16 16:41  10/24/16 21:13  10/24/16 16:41











- Medications


Medications: 


 Current Medications





Aspirin (Ecotrin)  81 mg PO DAILY [facility]


   Last Admin: 10/24/16 08:11 Dose:  81 mg


Divalproex Sodium (Depakote Dr(*Bid*))  500 mg PO BID [facility]


   Last Admin: 10/24/16 16:38 Dose:  500 mg


Enalapril Maleate (Vasotec)  10 mg PO DAILY [facility]


   Last Admin: 10/24/16 08:12 Dose:  10 mg


Famotidine (Pepcid)  20 mg PO BID [facility]


   Last Admin: 10/24/16 16:38 Dose:  20 mg


Ibuprofen (Motrin Tab)  600 mg PO Q8 PRN


   PRN Reason: Pain, moderate (4-7)


   Last Admin: 09/18/16 08:56 Dose:  600 mg


Metoprolol Tartrate (Lopressor)  50 mg PO Q12 [facility]


   Last Admin: 10/24/16 21:13 Dose:  50 mg


Quetiapine Fumarate (Seroquel)  25 mg PO HS [facility]


   Last Admin: 10/24/16 21:12 Dose:  25 mg











- Labs


Labs: 


 





 08/02/16 08:10 





 08/02/16 08:10 





 











PT  11.2 SECONDS (9.4-12.0)   12/14/15  05:20    


 


INR  1.05  (0.89-1.20)   12/14/15  05:20    


 


APTT  36.2 SECONDS (23.1-32.0)  H  12/14/15  05:20    














- Constitutional


Appears: Well, Non-toxic, No Acute Distress





- Head Exam


Head Exam: ATRAUMATIC, NORMAL INSPECTION, NORMOCEPHALIC





- Eye Exam


Eye Exam: EOMI, Normal appearance, PERRL


Pupil Exam: NORMAL ACCOMODATION, PERRL





- ENT Exam


ENT Exam: Mucous Membranes Moist, Normal Exam





- Neck Exam


Neck Exam: Full ROM, Normal Inspection.  absent: Lymphadenopathy





- Respiratory Exam


Respiratory Exam: Clear to Ausculation Bilateral, NORMAL BREATHING PATTERN





- Cardiovascular Exam


Cardiovascular Exam: REGULAR RHYTHM, RRR, +S1, +S2.  absent: Murmur





- GI/Abdominal Exam


GI & Abdominal Exam: Soft, Normal Bowel Sounds.  absent: Tenderness





- Extremities Exam


Extremities Exam: Full ROM, Normal Capillary Refill, Normal Inspection.  absent

: Joint Swelling, Pedal Edema





- Back Exam


Back Exam: NORMAL INSPECTION





- Neurological Exam


Neurological Exam: Alert, Awake, CN II-XII Intact, Normal Gait, Oriented x3





- Psychiatric Exam


Psychiatric exam: Normal Affect, Normal Mood





- Skin


Skin Exam: Dry, Intact, Normal Color, Warm





Assessment and Plan


(1) DVT prophylaxis


Status: Chronic





(2) CAD (coronary artery disease)


Status: Chronic





(3) Smoking


Status: Chronic





(4) Low back pain


Status: Chronic





(5) Scrotal edema


Status: Chronic





(6) Prediabetes


Status: Chronic





(7) Neurocognitive disorder


Status: Chronic








- Assessment and Plan (Free Text)


Assessment: 


(1) DVT prophylaxis


Assessment & Plan: 


scd and ae hsoe


ambualtion


Status: Chronic





(2) CAD (coronary artery disease)


Assessment & Plan: 


cont meds


s/p cardiac arrest


Status: Chronic





(3) Smoking


Assessment & Plan: 


wean off nicoderm


Status: Chronic





(4) Low back pain


Assessment & Plan: 


ibuprofen rpn


Status: Chronic





(5) Scrotal edema


Status: Chronic





(6) Prediabetes


Assessment & Plan: 


dietary control


actiovity


Status: Chronic





(7) Neurocognitive disorder


Assessment & Plan: 


cont meds


epdnign placement


guardian in place


Status: Chronic

## 2016-10-26 RX ADMIN — DIVALPROEX SODIUM SCH MG: 250 TABLET, DELAYED RELEASE ORAL at 16:48

## 2016-10-26 RX ADMIN — DIVALPROEX SODIUM SCH MG: 250 TABLET, DELAYED RELEASE ORAL at 08:55

## 2016-10-27 RX ADMIN — DIVALPROEX SODIUM SCH MG: 250 TABLET, DELAYED RELEASE ORAL at 17:09

## 2016-10-27 RX ADMIN — DIVALPROEX SODIUM SCH MG: 250 TABLET, DELAYED RELEASE ORAL at 09:11

## 2016-10-27 NOTE — CP.PCM.PN
Subjective





- Date & Time of Evaluation


Date of Evaluation: 10/27/16


Time of Evaluation: 07:12





- Subjective


Subjective: 


no f/c, n/v/d


no complaints/distress


pending placement





Objective





- Vital Signs/Intake and Output


Vital Signs (last 24 hours): 


 











Temp Pulse Resp BP Pulse Ox


 


 97.7 F   80   20   130/83   96 


 


 10/26/16 16:41  10/26/16 21:08  10/26/16 16:41  10/26/16 21:08  10/26/16 16:41











- Medications


Medications: 


 Current Medications





Aspirin (Ecotrin)  81 mg PO DAILY [facility]


   Last Admin: 10/26/16 08:56 Dose:  81 mg


Divalproex Sodium (Depakote Dr(*Bid*))  500 mg PO BID [facility]


   Last Admin: 10/26/16 16:48 Dose:  500 mg


Enalapril Maleate (Vasotec)  10 mg PO DAILY [facility]


   Last Admin: 10/26/16 16:47 Dose:  10 mg


Famotidine (Pepcid)  20 mg PO BID [facility]


   Last Admin: 10/26/16 16:47 Dose:  20 mg


Ibuprofen (Motrin Tab)  600 mg PO Q8 PRN


   PRN Reason: Pain, moderate (4-7)


   Last Admin: 09/18/16 08:56 Dose:  600 mg


Metoprolol Tartrate (Lopressor)  50 mg PO Q12 [facility]


   Last Admin: 10/26/16 21:08 Dose:  50 mg


Quetiapine Fumarate (Seroquel)  25 mg PO HS [facility]


   Last Admin: 10/26/16 21:08 Dose:  25 mg











- Labs


Labs: 


 





 08/02/16 08:10 





 08/02/16 08:10 





 











PT  11.2 SECONDS (9.4-12.0)   12/14/15  05:20    


 


INR  1.05  (0.89-1.20)   12/14/15  05:20    


 


APTT  36.2 SECONDS (23.1-32.0)  H  12/14/15  05:20    














- Constitutional


Appears: Well, Non-toxic, No Acute Distress





- Head Exam


Head Exam: ATRAUMATIC, NORMAL INSPECTION, NORMOCEPHALIC





- Eye Exam


Eye Exam: EOMI, Normal appearance, PERRL


Pupil Exam: NORMAL ACCOMODATION, PERRL





- ENT Exam


ENT Exam: Mucous Membranes Moist, Normal Exam





- Neck Exam


Neck Exam: Full ROM, Normal Inspection.  absent: Lymphadenopathy





- Respiratory Exam


Respiratory Exam: Clear to Ausculation Bilateral, NORMAL BREATHING PATTERN





- Cardiovascular Exam


Cardiovascular Exam: REGULAR RHYTHM, RRR, +S1, +S2.  absent: Murmur





- GI/Abdominal Exam


GI & Abdominal Exam: Soft, Normal Bowel Sounds.  absent: Tenderness





- Extremities Exam


Extremities Exam: Full ROM, Normal Capillary Refill, Normal Inspection.  absent

: Joint Swelling, Pedal Edema





- Back Exam


Back Exam: NORMAL INSPECTION





- Neurological Exam


Neurological Exam: Alert, Awake, CN II-XII Intact, Normal Gait, Oriented x3





- Psychiatric Exam


Psychiatric exam: Normal Affect, Normal Mood





- Skin


Skin Exam: Dry, Intact, Normal Color, Warm





Assessment and Plan


(1) DVT prophylaxis


Status: Chronic





(2) CAD (coronary artery disease)


Status: Chronic





(3) Smoking


Status: Chronic





(4) Low back pain


Status: Chronic





(5) Scrotal edema


Status: Chronic





(6) Prediabetes


Status: Chronic





(7) Neurocognitive disorder


Status: Chronic





(8) Leg edema, right


Status: Acute








- Assessment and Plan (Free Text)


Assessment: 


(1) DVT prophylaxis


Assessment & Plan: 


scd and ae hsoe


ambualtion


Status: Chronic





(2) CAD (coronary artery disease)


Assessment & Plan: 


cont meds


s/p cardiac arrest


Status: Chronic





(3) Smoking


Assessment & Plan: 


wean off nicoderm


Status: Chronic





(4) Low back pain


Assessment & Plan: 


ibuprofen rpn


Status: Chronic





(5) Scrotal edema


Status: Chronic





(6) Prediabetes


Assessment & Plan: 


dietary control


actiovity


Status: Chronic





(7) Neurocognitive disorder


Assessment & Plan: 


cont meds


epdnign placement


guardian in place


Status: Chronic

## 2016-10-28 RX ADMIN — DIVALPROEX SODIUM SCH MG: 250 TABLET, DELAYED RELEASE ORAL at 16:37

## 2016-10-28 RX ADMIN — DIVALPROEX SODIUM SCH MG: 250 TABLET, DELAYED RELEASE ORAL at 09:22

## 2016-10-28 NOTE — CP.PCM.PN
Subjective





- Date & Time of Evaluation


Date of Evaluation: 10/28/16


Time of Evaluation: 05:30





- Subjective


Subjective: 


dictated note





Objective





- Vital Signs/Intake and Output


Vital Signs (last 24 hours): 


 











Temp Pulse Resp BP Pulse Ox


 


 97.1 F L  65   20   109/66   99 


 


 10/28/16 07:57  10/28/16 09:22  10/28/16 07:57  10/28/16 09:22  10/28/16 07:57











- Medications


Medications: 


 Current Medications





Aspirin (Ecotrin)  81 mg PO DAILY [facility]


   Last Admin: 10/28/16 09:23 Dose:  81 mg


Divalproex Sodium (Depakote Dr(*Bid*))  500 mg PO BID [facility]


   Last Admin: 10/28/16 09:22 Dose:  500 mg


Enalapril Maleate (Vasotec)  10 mg PO DAILY [facility]


   Last Admin: 10/28/16 09:23 Dose:  10 mg


Famotidine (Pepcid)  20 mg PO BID [facility]


   Last Admin: 10/28/16 09:22 Dose:  20 mg


Ibuprofen (Motrin Tab)  600 mg PO Q8 PRN


   PRN Reason: Pain, moderate (4-7)


   Last Admin: 09/18/16 08:56 Dose:  600 mg


Metoprolol Tartrate (Lopressor)  50 mg PO Q12 [facility]


   Last Admin: 10/28/16 09:22 Dose:  50 mg


Quetiapine Fumarate (Seroquel)  25 mg PO HS [facility]


   Last Admin: 10/27/16 21:19 Dose:  25 mg











- Labs


Labs: 


 





 08/02/16 08:10 





 08/02/16 08:10 





 











PT  11.2 SECONDS (9.4-12.0)   12/14/15  05:20    


 


INR  1.05  (0.89-1.20)   12/14/15  05:20    


 


APTT  36.2 SECONDS (23.1-32.0)  H  12/14/15  05:20    














Assessment and Plan


(1) DVT prophylaxis


Status: Chronic





(2) CAD (coronary artery disease)


Status: Chronic





(3) Smoking


Status: Chronic





(4) Low back pain


Status: Chronic





(5) Scrotal edema


Status: Chronic





(6) Prediabetes


Status: Chronic





(7) Neurocognitive disorder


Status: Chronic





(8) Leg edema, right


Status: Acute

## 2016-10-28 NOTE — PN
DATE: 10/28/2016



The patient evaluated in bed.  No complaints, no distress.  No fever, chills, 
nausea, vomiting or diarrhea.  The patient remains pending placement.



REVIEW OF SYSTEMS:  Negative.



PHYSICAL EXAMINATION:  Negative.

GENERAL:  The patient alert and oriented at baseline.

HEART:  Regular rate and rhythm.  No murmurs, rubs, or gallops.

LUNGS:  Clear in all fields bilaterally.

ABDOMEN:  Soft, nontender.  Bowel sounds x 4.

EXTREMITIES:  Distal pulses, motor and sensation intact.  Cap refill is brisk.



DIAGNOSES AND PLAN:  

1.  Coronary artery disease, status post cardiac arrest.  Continue medications.
  

2.  Neurocognitive disorder.  Continue medications.  Pending placement.

3.  Deep venous thrombosis prophylaxis.  SCD and AE hose and ambulation. 

4. rle edema  Continue to monitor.

5.  Smoking cessation.  Off Nicoderm.  Doing well.

6.  Chronic low back pain, intermittent.  Ibuprofen p.r.n. 

7.  Prediabetes.  Dietary control.  

8.  Will continue to monitor patient until placement is established.





__________________________________________

Fan SILVER







cc:   



DD: 10/28/2016 07:48:29  [id number]

TT: 10/28/2016 08:15:54

Confirmation # [id number]

Dictation # [id number]

mn

SUMI

## 2016-10-29 RX ADMIN — DIVALPROEX SODIUM SCH MG: 250 TABLET, DELAYED RELEASE ORAL at 08:54

## 2016-10-29 RX ADMIN — DIVALPROEX SODIUM SCH MG: 250 TABLET, DELAYED RELEASE ORAL at 16:09

## 2016-10-29 NOTE — CP.PCM.PN
Subjective





- Date & Time of Evaluation


Date of Evaluation: 10/29/16


Time of Evaluation: 10:15





- Subjective


Subjective: 


Patient seen . Sitting in bed in NAD. Hemodynamically stable, afebrile. No 

acute issues overnight.





Objective





- Vital Signs/Intake and Output


Vital Signs (last 24 hours): 


 











Temp Pulse Resp BP Pulse Ox


 


 97.9 F   73   18   119/69   96 


 


 10/29/16 16:20  10/29/16 16:20  10/29/16 16:20  10/29/16 16:20  10/29/16 16:20











- Medications


Medications: 


 Current Medications





Aspirin (Ecotrin)  81 mg PO DAILY [facility]


   Last Admin: 10/29/16 08:54 Dose:  81 mg


Divalproex Sodium (Depakote Dr(*Bid*))  500 mg PO BID [facility]


   Last Admin: 10/29/16 16:09 Dose:  500 mg


Enalapril Maleate (Vasotec)  10 mg PO DAILY [facility]


   Last Admin: 10/29/16 08:53 Dose:  10 mg


Famotidine (Pepcid)  20 mg PO BID [facility]


   Last Admin: 10/29/16 16:09 Dose:  20 mg


Ibuprofen (Motrin Tab)  600 mg PO Q8 PRN


   PRN Reason: Pain, moderate (4-7)


   Last Admin: 09/18/16 08:56 Dose:  600 mg


Metoprolol Tartrate (Lopressor)  50 mg PO Q12 [facility]


   Last Admin: 10/29/16 08:53 Dose:  50 mg


Quetiapine Fumarate (Seroquel)  25 mg PO HS [facility]


   Last Admin: 10/28/16 21:54 Dose:  25 mg











- Labs


Labs: 


 





 08/02/16 08:10 





 08/02/16 08:10 





 











PT  11.2 SECONDS (9.4-12.0)   12/14/15  05:20    


 


INR  1.05  (0.89-1.20)   12/14/15  05:20    


 


APTT  36.2 SECONDS (23.1-32.0)  H  12/14/15  05:20    














- Constitutional


Appears: Non-toxic, No Acute Distress





- Head Exam


Head Exam: ATRAUMATIC, NORMOCEPHALIC





- Eye Exam


Eye Exam: EOMI, PERRL


Pupil Exam: NORMAL ACCOMODATION





- ENT Exam


ENT Exam: Mucous Membranes Moist, Normal Exam





- Neck Exam


Neck Exam: Normal Inspection





- Respiratory Exam


Respiratory Exam: Clear to Ausculation Bilateral.  absent: Rhonchi, Wheezes, 

Respiratory Distress





- Cardiovascular Exam


Cardiovascular Exam: REGULAR RHYTHM, +S1, +S2.  absent: JVD





- GI/Abdominal Exam


GI & Abdominal Exam: Soft, Normal Bowel Sounds.  absent: Tenderness





- Rectal Exam


Rectal Exam: Deferred





- Extremities Exam


Extremities Exam: Normal Inspection.  absent: Pedal Edema





- Neurological Exam


Neurological Exam: Alert, Awake, CN II-XII Intact





- Psychiatric Exam


Psychiatric exam: Flat Affect





- Skin


Skin Exam: Dry, Normal Color, Warm





Assessment and Plan





- Assessment and Plan (Free Text)


Assessment: 


56 y M homeless, brought in after having a cardiac arrest in [facility].  Patient 

is medically stable but has severe cognitive impairment which makes his 

discharge unsafe. Awaiting guardianship.





1. Cognitive Impairment with agitation - currently patient is cooperative and 

calm


-on Seroquel, Depakote and Ativan 


-Psychiatry consulted 


-patient awaiting guardianship








2.Cardiac Arrest


-doing well, s/p extensive ICU admission


-s/p cardiology input








3.HTN


-controlled with Metoprolol and Enalapril





4.Tobacco Abuse


-on Nicotine patch





5. CAD / s/p cardiac arrest 


-on ASA, BB, ACEi, statin


-Echo - 12/2015 - LVEF~40% , systolic function is moderately impaired mild 

septal hypokinesis, borderline LV concentric hypertrophy





6. Hx of Seizure ?


-on Depakote





7.DVT prophylaxis 


-ambulating in unit


-SCDs prn

## 2016-10-30 RX ADMIN — DIVALPROEX SODIUM SCH MG: 250 TABLET, DELAYED RELEASE ORAL at 08:25

## 2016-10-30 RX ADMIN — DIVALPROEX SODIUM SCH MG: 250 TABLET, DELAYED RELEASE ORAL at 16:03

## 2016-10-30 NOTE — CP.PCM.PN
Subjective





- Date & Time of Evaluation


Date of Evaluation: 10/30/16


Time of Evaluation: 18:02





- Subjective


Subjective: 


no complaints offered. no f/c, n/v/d. no distress


pendign placement





Objective





- Vital Signs/Intake and Output


Vital Signs (last 24 hours): 


 











Temp Pulse Resp BP Pulse Ox


 


 98.4 F   88   20   110/70   98 


 


 10/30/16 16:59  10/30/16 16:59  10/30/16 16:59  10/30/16 16:59  10/30/16 16:59











- Medications


Medications: 


 Current Medications





Aspirin (Ecotrin)  81 mg PO DAILY [facility]


   Last Admin: 10/30/16 08:26 Dose:  81 mg


Divalproex Sodium (Depakote Dr(*Bid*))  500 mg PO BID [facility]


   Last Admin: 10/30/16 16:03 Dose:  500 mg


Enalapril Maleate (Vasotec)  10 mg PO DAILY [facility]


   Last Admin: 10/30/16 08:25 Dose:  10 mg


Famotidine (Pepcid)  20 mg PO BID [facility]


   Last Admin: 10/30/16 16:03 Dose:  20 mg


Ibuprofen (Motrin Tab)  600 mg PO Q8 PRN


   PRN Reason: Pain, moderate (4-7)


   Last Admin: 09/18/16 08:56 Dose:  600 mg


Metoprolol Tartrate (Lopressor)  50 mg PO Q12 [facility]


   Last Admin: 10/30/16 08:25 Dose:  50 mg


Quetiapine Fumarate (Seroquel)  25 mg PO HS [facility]


   Last Admin: 10/29/16 21:36 Dose:  25 mg











- Labs


Labs: 


 





 08/02/16 08:10 





 08/02/16 08:10 





 











PT  11.2 SECONDS (9.4-12.0)   12/14/15  05:20    


 


INR  1.05  (0.89-1.20)   12/14/15  05:20    


 


APTT  36.2 SECONDS (23.1-32.0)  H  12/14/15  05:20    














- Constitutional


Appears: Well, Non-toxic, No Acute Distress





- Head Exam


Head Exam: ATRAUMATIC, NORMAL INSPECTION, NORMOCEPHALIC





- Eye Exam


Eye Exam: EOMI, Normal appearance, PERRL


Pupil Exam: NORMAL ACCOMODATION, PERRL





- ENT Exam


ENT Exam: Mucous Membranes Moist, Normal Exam





- Neck Exam


Neck Exam: Full ROM, Normal Inspection.  absent: Lymphadenopathy





- Respiratory Exam


Respiratory Exam: Clear to Ausculation Bilateral, NORMAL BREATHING PATTERN





- Cardiovascular Exam


Cardiovascular Exam: REGULAR RHYTHM, +S1, +S2.  absent: Murmur





- GI/Abdominal Exam


GI & Abdominal Exam: Soft, Normal Bowel Sounds.  absent: Tenderness





- Extremities Exam


Extremities Exam: Full ROM, Normal Capillary Refill, Normal Inspection.  absent

: Joint Swelling, Pedal Edema





- Back Exam


Back Exam: NORMAL INSPECTION





- Neurological Exam


Neurological Exam: Alert, Awake, CN II-XII Intact, Normal Gait, Oriented x3





- Psychiatric Exam


Psychiatric exam: Normal Affect, Normal Mood





- Skin


Skin Exam: Dry, Intact, Normal Color, Warm





Assessment and Plan


(1) DVT prophylaxis


Status: Chronic





(2) CAD (coronary artery disease)


Status: Chronic





(3) Smoking


Status: Chronic





(4) Low back pain


Status: Chronic





(5) Scrotal edema


Status: Chronic





(6) Prediabetes


Status: Chronic





(7) Neurocognitive disorder


Status: Chronic





(8) Leg edema, right


Status: Acute








- Assessment and Plan (Free Text)


Assessment: 


(1) DVT prophylaxis


Assessment & Plan: 


scd and ae hsoe


ambualtion


Status: Chronic





(2) CAD (coronary artery disease)


Assessment & Plan: 


cont meds


s/p cardiac arrest


Status: Chronic





(3) Smoking


Assessment & Plan: 


wean off nicoderm


Status: Chronic





(4) Low back pain


Assessment & Plan: 


ibuprofen rpn


Status: Chronic





(5) Scrotal edema


Status: Chronic





(6) Prediabetes


Assessment & Plan: 


dietary control


actiovity


Status: Chronic





(7) Neurocognitive disorder


Assessment & Plan: 


cont meds


epdnign placement


guardian in place


Status: Chronic

## 2016-10-31 RX ADMIN — DIVALPROEX SODIUM SCH MG: 250 TABLET, DELAYED RELEASE ORAL at 17:22

## 2016-10-31 RX ADMIN — DIVALPROEX SODIUM SCH MG: 250 TABLET, DELAYED RELEASE ORAL at 08:39

## 2016-10-31 NOTE — CP.PCM.PN
Subjective





- Date & Time of Evaluation


Date of Evaluation: 10/31/16


Time of Evaluation: 08:44





- Subjective


Subjective: 


no f/c, n/v/d


no compalints/distress


pending placement





Objective





- Vital Signs/Intake and Output


Vital Signs (last 24 hours): 


 











Temp Pulse Resp BP Pulse Ox


 


 97.8 F   68   20   121/70   97 


 


 10/31/16 07:48  10/31/16 08:39  10/31/16 07:48  10/31/16 08:39  10/31/16 07:48











- Medications


Medications: 


 Current Medications





Aspirin (Ecotrin)  81 mg PO DAILY [facility]


   Last Admin: 10/31/16 08:39 Dose:  81 mg


Divalproex Sodium (Depakote Dr(*Bid*))  500 mg PO BID [facility]


   Last Admin: 10/31/16 08:39 Dose:  500 mg


Enalapril Maleate (Vasotec)  10 mg PO DAILY [facility]


   Last Admin: 10/31/16 08:42 Dose:  10 mg


Famotidine (Pepcid)  20 mg PO BID [facility]


   Last Admin: 10/31/16 08:42 Dose:  20 mg


Ibuprofen (Motrin Tab)  600 mg PO Q8 PRN


   PRN Reason: Pain, moderate (4-7)


   Last Admin: 10/31/16 08:42 Dose:  600 mg


Metoprolol Tartrate (Lopressor)  50 mg PO Q12 [facility]


   Last Admin: 10/31/16 08:39 Dose:  50 mg


Quetiapine Fumarate (Seroquel)  25 mg PO HS [facility]


   Last Admin: 10/30/16 21:10 Dose:  25 mg











- Labs


Labs: 


 





 08/02/16 08:10 





 08/02/16 08:10 





 











PT  11.2 SECONDS (9.4-12.0)   12/14/15  05:20    


 


INR  1.05  (0.89-1.20)   12/14/15  05:20    


 


APTT  36.2 SECONDS (23.1-32.0)  H  12/14/15  05:20    














- Constitutional


Appears: Well, Non-toxic, No Acute Distress





- Head Exam


Head Exam: ATRAUMATIC, NORMAL INSPECTION, NORMOCEPHALIC





- Eye Exam


Eye Exam: EOMI, Normal appearance, PERRL


Pupil Exam: NORMAL ACCOMODATION, PERRL





- ENT Exam


ENT Exam: Mucous Membranes Moist, Normal Exam





- Neck Exam


Neck Exam: Full ROM, Normal Inspection.  absent: Lymphadenopathy





- Respiratory Exam


Respiratory Exam: Clear to Ausculation Bilateral, NORMAL BREATHING PATTERN





- Cardiovascular Exam


Cardiovascular Exam: REGULAR RHYTHM, RRR, +S1, +S2.  absent: Murmur





- GI/Abdominal Exam


GI & Abdominal Exam: Soft, Normal Bowel Sounds.  absent: Tenderness





- Extremities Exam


Extremities Exam: Full ROM, Normal Capillary Refill, Normal Inspection.  absent

: Joint Swelling, Pedal Edema





- Back Exam


Back Exam: NORMAL INSPECTION





- Neurological Exam


Neurological Exam: Alert, Awake, CN II-XII Intact, Normal Gait, Oriented x3





- Psychiatric Exam


Psychiatric exam: Normal Affect, Normal Mood





- Skin


Skin Exam: Dry, Intact, Normal Color, Warm





Assessment and Plan


(1) DVT prophylaxis


Status: Chronic





(2) CAD (coronary artery disease)


Status: Chronic





(3) Smoking


Status: Chronic





(4) Low back pain


Status: Chronic





(5) Scrotal edema


Status: Chronic





(6) Prediabetes


Status: Chronic





(7) Neurocognitive disorder


Status: Chronic





(8) Leg edema, right


Status: Acute








- Assessment and Plan (Free Text)


Assessment: 


(1) DVT prophylaxis


Assessment & Plan: 


scd and ae hsoe


ambualtion


Status: Chronic





(2) CAD (coronary artery disease)


Assessment & Plan: 


cont meds


s/p cardiac arrest


Status: Chronic





(3) Smoking


Assessment & Plan: 


wean off nicoderm


Status: Chronic





(4) Low back pain


Assessment & Plan: 


ibuprofen rpn


Status: Chronic





(5) Scrotal edema


Status: Chronic





(6) Prediabetes


Assessment & Plan: 


dietary control


actiovity


Status: Chronic





(7) Neurocognitive disorder


Assessment & Plan: 


cont meds


epdnign placement


guardian in place


Status: Chronic

## 2016-11-01 RX ADMIN — DIVALPROEX SODIUM SCH MG: 250 TABLET, DELAYED RELEASE ORAL at 09:06

## 2016-11-01 RX ADMIN — DIVALPROEX SODIUM SCH MG: 250 TABLET, DELAYED RELEASE ORAL at 16:59

## 2016-11-01 NOTE — CP.PCM.PN
Subjective





- Date & Time of Evaluation


Date of Evaluation: 11/01/16


Time of Evaluation: 07:57





- Subjective


Subjective: 


no f/c, n/v/d


no complaints/distress


pending placement





Objective





- Vital Signs/Intake and Output


Vital Signs (last 24 hours): 


 











Temp Pulse Resp BP Pulse Ox


 


 98.4 F   78   20   130/79   97 


 


 10/31/16 22:57  10/31/16 22:57  10/31/16 22:57  10/31/16 22:57  10/31/16 22:57











- Medications


Medications: 


 Current Medications





Aspirin (Ecotrin)  81 mg PO DAILY [facility]


   Last Admin: 10/31/16 08:39 Dose:  81 mg


Divalproex Sodium (Depakote Dr(*Bid*))  500 mg PO BID [facility]


   Last Admin: 10/31/16 17:22 Dose:  500 mg


Enalapril Maleate (Vasotec)  10 mg PO DAILY [facility]


   Last Admin: 10/31/16 08:42 Dose:  10 mg


Famotidine (Pepcid)  20 mg PO BID [facility]


   Last Admin: 10/31/16 17:21 Dose:  20 mg


Ibuprofen (Motrin Tab)  600 mg PO Q8 PRN


   PRN Reason: Pain, moderate (4-7)


   Last Admin: 10/31/16 08:42 Dose:  600 mg


Metoprolol Tartrate (Lopressor)  50 mg PO Q12 [facility]


   Last Admin: 10/31/16 21:02 Dose:  50 mg


Quetiapine Fumarate (Seroquel)  25 mg PO HS [facility]


   Last Admin: 10/31/16 21:02 Dose:  25 mg











- Labs


Labs: 


 





 08/02/16 08:10 





 08/02/16 08:10 





 











PT  11.2 SECONDS (9.4-12.0)   12/14/15  05:20    


 


INR  1.05  (0.89-1.20)   12/14/15  05:20    


 


APTT  36.2 SECONDS (23.1-32.0)  H  12/14/15  05:20    














- Constitutional


Appears: Well, Non-toxic, No Acute Distress





- Head Exam


Head Exam: ATRAUMATIC, NORMAL INSPECTION, NORMOCEPHALIC





- Eye Exam


Eye Exam: EOMI, Normal appearance, PERRL


Pupil Exam: NORMAL ACCOMODATION, PERRL





- ENT Exam


ENT Exam: Mucous Membranes Moist, Normal Exam





- Neck Exam


Neck Exam: Full ROM, Normal Inspection.  absent: Lymphadenopathy





- Respiratory Exam


Respiratory Exam: Clear to Ausculation Bilateral, NORMAL BREATHING PATTERN





- Cardiovascular Exam


Cardiovascular Exam: REGULAR RHYTHM, RRR, +S1, +S2.  absent: Murmur





- GI/Abdominal Exam


GI & Abdominal Exam: Soft, Normal Bowel Sounds.  absent: Tenderness





- Extremities Exam


Extremities Exam: Full ROM, Normal Capillary Refill, Normal Inspection.  absent

: Joint Swelling, Pedal Edema





- Back Exam


Back Exam: NORMAL INSPECTION





- Neurological Exam


Neurological Exam: Alert, Awake, CN II-XII Intact, Normal Gait, Oriented x3





- Psychiatric Exam


Psychiatric exam: Normal Affect, Normal Mood





- Skin


Skin Exam: Dry, Intact, Normal Color, Warm





Assessment and Plan


(1) DVT prophylaxis


Status: Chronic





(2) CAD (coronary artery disease)


Status: Chronic





(3) Smoking


Status: Chronic





(4) Low back pain


Status: Chronic





(5) Scrotal edema


Status: Chronic





(6) Prediabetes


Status: Chronic





(7) Neurocognitive disorder


Status: Chronic





(8) Leg edema, right


Status: Acute








- Assessment and Plan (Free Text)


Assessment: 


(1) DVT prophylaxis


Assessment & Plan: 


scd and ae hsoe


ambualtion


Status: Chronic





(2) CAD (coronary artery disease)


Assessment & Plan: 


cont meds


s/p cardiac arrest


Status: Chronic





(3) Smoking


Assessment & Plan: 


wean off nicoderm


Status: Chronic





(4) Low back pain


Assessment & Plan: 


ibuprofen rpn


Status: Chronic





(5) Scrotal edema


Status: Chronic





(6) Prediabetes


Assessment & Plan: 


dietary control


actiovity


Status: Chronic





(7) Neurocognitive disorder


Assessment & Plan: 


cont meds


epdnign placement


guardian in place


Status: Chronic

## 2016-11-02 RX ADMIN — DIVALPROEX SODIUM SCH MG: 250 TABLET, DELAYED RELEASE ORAL at 10:35

## 2016-11-02 RX ADMIN — DIVALPROEX SODIUM SCH MG: 250 TABLET, DELAYED RELEASE ORAL at 17:07

## 2016-11-02 NOTE — CP.PCM.PN
Subjective





- Date & Time of Evaluation


Date of Evaluation: 11/02/16


Time of Evaluation: 07:22





- Subjective


Subjective: 


pt comfortable and w/o distress


 no f/c, n/v/d


pending placement





Objective





- Vital Signs/Intake and Output


Vital Signs (last 24 hours): 


 











Temp Pulse Resp BP Pulse Ox


 


 97.9 F   85   18   123/90   99 


 


 11/01/16 22:42  11/01/16 22:42  11/01/16 22:42  11/01/16 22:42  11/01/16 22:42











- Medications


Medications: 


 Current Medications





Aspirin (Ecotrin)  81 mg PO DAILY [facility]


   Last Admin: 11/01/16 09:06 Dose:  81 mg


Divalproex Sodium (Depakote Dr(*Bid*))  500 mg PO BID [facility]


   Last Admin: 11/01/16 16:59 Dose:  500 mg


Enalapril Maleate (Vasotec)  10 mg PO DAILY [facility]


   Last Admin: 11/01/16 09:07 Dose:  10 mg


Famotidine (Pepcid)  20 mg PO BID [facility]


   Last Admin: 11/01/16 16:59 Dose:  20 mg


Ibuprofen (Motrin Tab)  600 mg PO Q8 PRN


   PRN Reason: Pain, moderate (4-7)


   Last Admin: 10/31/16 08:42 Dose:  600 mg


Metoprolol Tartrate (Lopressor)  50 mg PO Q12 [facility]


   Last Admin: 11/01/16 20:39 Dose:  50 mg


Quetiapine Fumarate (Seroquel)  25 mg PO HS [facility]


   Last Admin: 11/01/16 21:03 Dose:  25 mg











- Labs


Labs: 


 





 08/02/16 08:10 





 08/02/16 08:10 





 











PT  11.2 SECONDS (9.4-12.0)   12/14/15  05:20    


 


INR  1.05  (0.89-1.20)   12/14/15  05:20    


 


APTT  36.2 SECONDS (23.1-32.0)  H  12/14/15  05:20    














- Constitutional


Appears: Well, Non-toxic, No Acute Distress





- Head Exam


Head Exam: ATRAUMATIC, NORMAL INSPECTION, NORMOCEPHALIC





- Eye Exam


Eye Exam: EOMI, Normal appearance, PERRL


Pupil Exam: NORMAL ACCOMODATION, PERRL





- ENT Exam


ENT Exam: Mucous Membranes Moist, Normal Exam





- Neck Exam


Neck Exam: Full ROM, Normal Inspection.  absent: Lymphadenopathy





- Respiratory Exam


Respiratory Exam: Clear to Ausculation Bilateral, NORMAL BREATHING PATTERN





- Cardiovascular Exam


Cardiovascular Exam: REGULAR RHYTHM, RRR, +S1, +S2.  absent: Murmur





- GI/Abdominal Exam


GI & Abdominal Exam: Soft, Normal Bowel Sounds.  absent: Tenderness





-  Exam


Bimanual exam: NORMAL BIMANUAL EXAM





- Extremities Exam


Extremities Exam: Full ROM, Normal Capillary Refill, Normal Inspection.  absent

: Joint Swelling, Pedal Edema





- Back Exam


Back Exam: NORMAL INSPECTION





- Neurological Exam


Neurological Exam: Alert, Awake, CN II-XII Intact, Normal Gait, Oriented x3





- Psychiatric Exam


Psychiatric exam: Normal Affect, Normal Mood





- Skin


Skin Exam: Dry, Intact, Normal Color, Warm





Assessment and Plan


(1) DVT prophylaxis


Status: Chronic





(2) CAD (coronary artery disease)


Status: Chronic





(3) Smoking


Status: Chronic





(4) Low back pain


Status: Chronic





(5) Scrotal edema


Status: Chronic





(6) Prediabetes


Status: Chronic





(7) Neurocognitive disorder


Status: Chronic





(8) Leg edema, right


Status: Acute








- Assessment and Plan (Free Text)


Assessment: 


(1) DVT prophylaxis


Assessment & Plan: 


scd and ae hsoe


ambualtion


Status: Chronic





(2) CAD (coronary artery disease)


Assessment & Plan: 


cont meds


s/p cardiac arrest


Status: Chronic





(3) Smoking


Assessment & Plan: 


wean off nicoderm


Status: Chronic





(4) Low back pain


Assessment & Plan: 


ibuprofen rpn


Status: Chronic





(5) Scrotal edema


Status: Chronic





(6) Prediabetes


Assessment & Plan: 


dietary control


actiovity


Status: Chronic





(7) Neurocognitive disorder


Assessment & Plan: 


cont meds


epdnign placement


guardian in place


Status: Chronic

## 2016-11-03 RX ADMIN — DIVALPROEX SODIUM SCH MG: 250 TABLET, DELAYED RELEASE ORAL at 08:57

## 2016-11-03 RX ADMIN — DIVALPROEX SODIUM SCH MG: 250 TABLET, DELAYED RELEASE ORAL at 17:53

## 2016-11-03 NOTE — CP.PCM.PN
Subjective





- Date & Time of Evaluation


Date of Evaluation: 11/03/16


Time of Evaluation: 08:03





- Subjective


Subjective: 


no complaints/distress


no f/c, n/v/d


pending placement





Objective





- Vital Signs/Intake and Output


Vital Signs (last 24 hours): 


 











Temp Pulse Resp BP Pulse Ox


 


 98.2 F   77   20   115/56 L  100 


 


 11/02/16 20:20  11/02/16 21:25  11/02/16 20:20  11/02/16 21:25  11/02/16 20:20











- Medications


Medications: 


 Current Medications





Aspirin (Ecotrin)  81 mg PO DAILY [facility]


   Last Admin: 11/02/16 10:36 Dose:  81 mg


Divalproex Sodium (Depakote Dr(*Bid*))  500 mg PO BID [facility]


   Last Admin: 11/02/16 17:07 Dose:  500 mg


Enalapril Maleate (Vasotec)  10 mg PO DAILY [facility]


   Last Admin: 11/02/16 10:38 Dose:  10 mg


Famotidine (Pepcid)  20 mg PO BID [facility]


   Last Admin: 11/02/16 17:08 Dose:  20 mg


Ibuprofen (Motrin Tab)  600 mg PO Q8 PRN


   PRN Reason: Pain, moderate (4-7)


   Last Admin: 10/31/16 08:42 Dose:  600 mg


Metoprolol Tartrate (Lopressor)  50 mg PO Q12 [facility]


   Last Admin: 11/02/16 21:25 Dose:  50 mg


Quetiapine Fumarate (Seroquel)  25 mg PO HS [facility]


   Last Admin: 11/02/16 21:25 Dose:  25 mg











- Labs


Labs: 


 





 08/02/16 08:10 





 08/02/16 08:10 





 











PT  11.2 SECONDS (9.4-12.0)   12/14/15  05:20    


 


INR  1.05  (0.89-1.20)   12/14/15  05:20    


 


APTT  36.2 SECONDS (23.1-32.0)  H  12/14/15  05:20    














- Constitutional


Appears: Well, Non-toxic, No Acute Distress





- Head Exam


Head Exam: ATRAUMATIC, NORMAL INSPECTION, NORMOCEPHALIC





- Eye Exam


Eye Exam: EOMI, Normal appearance, PERRL


Pupil Exam: NORMAL ACCOMODATION, PERRL





- ENT Exam


ENT Exam: Mucous Membranes Moist, Normal Exam





- Neck Exam


Neck Exam: Full ROM, Normal Inspection.  absent: Lymphadenopathy





- Respiratory Exam


Respiratory Exam: Clear to Ausculation Bilateral, NORMAL BREATHING PATTERN





- Cardiovascular Exam


Cardiovascular Exam: REGULAR RHYTHM, +S1, +S2.  absent: Murmur





- GI/Abdominal Exam


GI & Abdominal Exam: Soft, Normal Bowel Sounds.  absent: Tenderness





- Extremities Exam


Extremities Exam: Full ROM, Normal Capillary Refill, Normal Inspection.  absent

: Joint Swelling, Pedal Edema





- Back Exam


Back Exam: NORMAL INSPECTION





- Neurological Exam


Neurological Exam: Alert, Awake, CN II-XII Intact, Normal Gait, Oriented x3





- Psychiatric Exam


Psychiatric exam: Normal Affect, Normal Mood





- Skin


Skin Exam: Dry, Intact, Normal Color, Warm





Assessment and Plan


(1) DVT prophylaxis


Status: Chronic





(2) CAD (coronary artery disease)


Status: Chronic





(3) Smoking


Status: Chronic





(4) Low back pain


Status: Chronic





(5) Scrotal edema


Status: Chronic





(6) Prediabetes


Status: Chronic





(7) Neurocognitive disorder


Status: Chronic





(8) Leg edema, right


Status: Acute








- Assessment and Plan (Free Text)


Assessment: 


(1) DVT prophylaxis


Assessment & Plan: 


scd and ae hsoe


ambualtion


Status: Chronic





(2) CAD (coronary artery disease)


Assessment & Plan: 


cont meds


s/p cardiac arrest


Status: Chronic





(3) Smoking


Assessment & Plan: 


wean off nicoderm


Status: Chronic





(4) Low back pain


Assessment & Plan: 


ibuprofen rpn


Status: Chronic





(5) Scrotal edema


Status: Chronic





(6) Prediabetes


Assessment & Plan: 


dietary control


actiovity


Status: Chronic





(7) Neurocognitive disorder


Assessment & Plan: 


cont meds


epdnign placement


guardian in place


Status: Chronic

## 2016-11-04 RX ADMIN — DIVALPROEX SODIUM SCH MG: 250 TABLET, DELAYED RELEASE ORAL at 17:02

## 2016-11-04 RX ADMIN — DIVALPROEX SODIUM SCH MG: 250 TABLET, DELAYED RELEASE ORAL at 09:15

## 2016-11-04 NOTE — CP.PCM.PN
Subjective





- Date & Time of Evaluation


Date of Evaluation: 11/04/16


Time of Evaluation: 13:41





- Subjective


Subjective: 


no complaints/distress


no f/c, n/v/d


pending placement





Objective





- Vital Signs/Intake and Output


Vital Signs (last 24 hours): 


 











Temp Pulse Resp BP Pulse Ox


 


 97.5 F L  71   20   121/75   98 


 


 11/04/16 08:50  11/04/16 09:15  11/04/16 08:50  11/04/16 09:15  11/04/16 08:50











- Medications


Medications: 


 Current Medications





Aspirin (Ecotrin)  81 mg PO DAILY [facility]


   Last Admin: 11/04/16 09:16 Dose:  81 mg


Divalproex Sodium (Depakote Dr(*Bid*))  500 mg PO BID [facility]


   Last Admin: 11/04/16 09:15 Dose:  500 mg


Enalapril Maleate (Vasotec)  10 mg PO DAILY [facility]


   Last Admin: 11/04/16 09:16 Dose:  10 mg


Famotidine (Pepcid)  20 mg PO BID [facility]


   Last Admin: 11/04/16 09:16 Dose:  20 mg


Ibuprofen (Motrin Tab)  600 mg PO Q8 PRN


   PRN Reason: Pain, moderate (4-7)


   Last Admin: 10/31/16 08:42 Dose:  600 mg


Metoprolol Tartrate (Lopressor)  50 mg PO Q12 [facility]


   Last Admin: 11/04/16 09:15 Dose:  50 mg


Quetiapine Fumarate (Seroquel)  25 mg PO HS [facility]


   Last Admin: 11/03/16 21:44 Dose:  25 mg











- Labs


Labs: 


 





 08/02/16 08:10 





 08/02/16 08:10 





 











PT  11.2 SECONDS (9.4-12.0)   12/14/15  05:20    


 


INR  1.05  (0.89-1.20)   12/14/15  05:20    


 


APTT  36.2 SECONDS (23.1-32.0)  H  12/14/15  05:20    














- Constitutional


Appears: Well, Non-toxic, No Acute Distress





- Head Exam


Head Exam: ATRAUMATIC, NORMAL INSPECTION, NORMOCEPHALIC





- Eye Exam


Eye Exam: EOMI, Normal appearance, PERRL


Pupil Exam: NORMAL ACCOMODATION, PERRL





- ENT Exam


ENT Exam: Mucous Membranes Moist, Normal Exam





- Neck Exam


Neck Exam: Full ROM, Normal Inspection.  absent: Lymphadenopathy





- Respiratory Exam


Respiratory Exam: Clear to Ausculation Bilateral, NORMAL BREATHING PATTERN





- Cardiovascular Exam


Cardiovascular Exam: REGULAR RHYTHM, +S1, +S2.  absent: Murmur





- GI/Abdominal Exam


GI & Abdominal Exam: Soft, Normal Bowel Sounds.  absent: Tenderness





- Extremities Exam


Extremities Exam: Full ROM, Normal Capillary Refill, Normal Inspection.  absent

: Joint Swelling, Pedal Edema





- Back Exam


Back Exam: NORMAL INSPECTION





- Neurological Exam


Neurological Exam: Alert, Awake, CN II-XII Intact, Normal Gait, Oriented x3





- Psychiatric Exam


Psychiatric exam: Normal Affect, Normal Mood





- Skin


Skin Exam: Dry, Intact, Normal Color, Warm





Assessment and Plan


(1) DVT prophylaxis


Status: Chronic





(2) CAD (coronary artery disease)


Status: Chronic





(3) Smoking


Status: Chronic





(4) Low back pain


Status: Chronic





(5) Scrotal edema


Status: Chronic





(6) Prediabetes


Status: Chronic





(7) Neurocognitive disorder


Status: Chronic





(8) Leg edema, right


Status: Acute








- Assessment and Plan (Free Text)


Assessment: 


(1) DVT prophylaxis


Assessment & Plan: 


scd and ae hsoe


ambualtion


Status: Chronic





(2) CAD (coronary artery disease)


Assessment & Plan: 


cont meds


s/p cardiac arrest


Status: Chronic





(3) Smoking


Assessment & Plan: 


wean off nicoderm


Status: Chronic





(4) Low back pain


Assessment & Plan: 


ibuprofen rpn


Status: Chronic





(5) Scrotal edema


Status: Chronic





(6) Prediabetes


Assessment & Plan: 


dietary control


actiovity


Status: Chronic





(7) Neurocognitive disorder


Assessment & Plan: 


cont meds


epdnign placement


guardian in place


Status: Chronic

## 2016-11-05 RX ADMIN — DIVALPROEX SODIUM SCH MG: 250 TABLET, DELAYED RELEASE ORAL at 16:10

## 2016-11-05 RX ADMIN — DIVALPROEX SODIUM SCH MG: 250 TABLET, DELAYED RELEASE ORAL at 09:42

## 2016-11-05 NOTE — CP.PCM.PN
Subjective





- Date & Time of Evaluation


Date of Evaluation: 11/05/16


Time of Evaluation: 07:04





- Subjective


Subjective: 


no f/c, n/v/d


no complaints/distress


pending placement





Objective





- Vital Signs/Intake and Output


Vital Signs (last 24 hours): 


 











Temp Pulse Resp BP Pulse Ox


 


 97.9 F   78   17   121/69   99 


 


 11/04/16 21:47  11/04/16 21:47  11/04/16 21:47  11/04/16 21:47  11/04/16 21:47











- Medications


Medications: 


 Current Medications





Aspirin (Ecotrin)  81 mg PO DAILY [facility]


   Last Admin: 11/04/16 09:16 Dose:  81 mg


Divalproex Sodium (Depakote Dr(*Bid*))  500 mg PO BID [facility]


   Last Admin: 11/04/16 17:02 Dose:  500 mg


Enalapril Maleate (Vasotec)  10 mg PO DAILY [facility]


   Last Admin: 11/04/16 09:16 Dose:  10 mg


Famotidine (Pepcid)  20 mg PO BID [facility]


   Last Admin: 11/04/16 17:02 Dose:  20 mg


Ibuprofen (Motrin Tab)  600 mg PO Q8 PRN


   PRN Reason: Pain, moderate (4-7)


   Last Admin: 10/31/16 08:42 Dose:  600 mg


Metoprolol Tartrate (Lopressor)  50 mg PO Q12 [facility]


   Last Admin: 11/04/16 21:47 Dose:  50 mg


Quetiapine Fumarate (Seroquel)  25 mg PO HS [facility]


   Last Admin: 11/04/16 21:47 Dose:  25 mg











- Labs


Labs: 


 





 08/02/16 08:10 





 08/02/16 08:10 





 











PT  11.2 SECONDS (9.4-12.0)   12/14/15  05:20    


 


INR  1.05  (0.89-1.20)   12/14/15  05:20    


 


APTT  36.2 SECONDS (23.1-32.0)  H  12/14/15  05:20    














- Constitutional


Appears: Well, Non-toxic, No Acute Distress





- Head Exam


Head Exam: ATRAUMATIC, NORMAL INSPECTION, NORMOCEPHALIC





- Eye Exam


Eye Exam: EOMI, Normal appearance, PERRL


Pupil Exam: NORMAL ACCOMODATION, PERRL





- ENT Exam


ENT Exam: Mucous Membranes Moist, Normal Exam





- Neck Exam


Neck Exam: Full ROM, Normal Inspection.  absent: Lymphadenopathy





- Respiratory Exam


Respiratory Exam: Clear to Ausculation Bilateral, NORMAL BREATHING PATTERN





- Cardiovascular Exam


Cardiovascular Exam: REGULAR RHYTHM, RRR, +S1, +S2.  absent: Murmur





- GI/Abdominal Exam


GI & Abdominal Exam: Soft, Normal Bowel Sounds.  absent: Tenderness





- Extremities Exam


Extremities Exam: Full ROM, Normal Capillary Refill, Normal Inspection.  absent

: Joint Swelling, Pedal Edema





- Back Exam


Back Exam: NORMAL INSPECTION





- Neurological Exam


Neurological Exam: Alert, Awake, CN II-XII Intact, Normal Gait, Oriented x3





- Psychiatric Exam


Psychiatric exam: Normal Affect, Normal Mood





- Skin


Skin Exam: Dry, Intact, Normal Color, Warm





Assessment and Plan


(1) DVT prophylaxis


Status: Chronic





(2) CAD (coronary artery disease)


Status: Chronic





(3) Smoking


Status: Chronic





(4) Low back pain


Status: Chronic





(5) Scrotal edema


Status: Chronic





(6) Prediabetes


Status: Chronic





(7) Neurocognitive disorder


Status: Chronic





(8) Leg edema, right


Status: Acute








- Assessment and Plan (Free Text)


Assessment: 


(1) DVT prophylaxis


Assessment & Plan: 


scd and ae hsoe


ambualtion


Status: Chronic





(2) CAD (coronary artery disease)


Assessment & Plan: 


cont meds


s/p cardiac arrest


Status: Chronic





(3) Smoking


Assessment & Plan: 


wean off nicoderm


Status: Chronic





(4) Low back pain


Assessment & Plan: 


ibuprofen rpn


Status: Chronic





(5) Scrotal edema


Status: Chronic





(6) Prediabetes


Assessment & Plan: 


dietary control


actiovity


Status: Chronic





(7) Neurocognitive disorder


Assessment & Plan: 


cont meds


epdnign placement


guardian in place


Status: Chronic

## 2016-11-06 RX ADMIN — DIVALPROEX SODIUM SCH MG: 250 TABLET, DELAYED RELEASE ORAL at 09:01

## 2016-11-06 RX ADMIN — DIVALPROEX SODIUM SCH MG: 250 TABLET, DELAYED RELEASE ORAL at 16:47

## 2016-11-06 NOTE — CP.PCM.PN
Subjective





- Date & Time of Evaluation


Date of Evaluation: 11/06/16


Time of Evaluation: 12:14





- Subjective


Subjective: 


no complaints/distress. no f/c, n/v/d


pending placement





Objective





- Vital Signs/Intake and Output


Vital Signs (last 24 hours): 


 











Temp Pulse Resp BP Pulse Ox


 


 97.2 F L  66   20   141/77   100 


 


 11/06/16 09:00  11/06/16 09:02  11/06/16 09:00  11/06/16 09:02  11/06/16 09:00











- Medications


Medications: 


 Current Medications





Aspirin (Ecotrin)  81 mg PO DAILY [facility]


   Last Admin: 11/06/16 09:02 Dose:  81 mg


Divalproex Sodium (Depakote Dr(*Bid*))  500 mg PO BID [facility]


   Last Admin: 11/06/16 09:01 Dose:  500 mg


Enalapril Maleate (Vasotec)  10 mg PO DAILY [facility]


   Last Admin: 11/06/16 09:03 Dose:  10 mg


Famotidine (Pepcid)  20 mg PO BID [facility]


   Last Admin: 11/06/16 09:03 Dose:  20 mg


Ibuprofen (Motrin Tab)  600 mg PO Q8 PRN


   PRN Reason: Pain, moderate (4-7)


   Last Admin: 10/31/16 08:42 Dose:  600 mg


Metoprolol Tartrate (Lopressor)  50 mg PO Q12 [facility]


   Last Admin: 11/06/16 09:02 Dose:  50 mg


Quetiapine Fumarate (Seroquel)  25 mg PO HS [facility]


   Last Admin: 11/05/16 21:55 Dose:  25 mg











- Labs


Labs: 


 





 08/02/16 08:10 





 08/02/16 08:10 





 











PT  11.2 SECONDS (9.4-12.0)   12/14/15  05:20    


 


INR  1.05  (0.89-1.20)   12/14/15  05:20    


 


APTT  36.2 SECONDS (23.1-32.0)  H  12/14/15  05:20    














- Constitutional


Appears: Well, Non-toxic, No Acute Distress





- Head Exam


Head Exam: ATRAUMATIC, NORMAL INSPECTION, NORMOCEPHALIC





- Eye Exam


Eye Exam: EOMI, Normal appearance, PERRL


Pupil Exam: NORMAL ACCOMODATION, PERRL





- ENT Exam


ENT Exam: Mucous Membranes Moist, Normal Exam





- Neck Exam


Neck Exam: Full ROM, Normal Inspection.  absent: Lymphadenopathy





- Respiratory Exam


Respiratory Exam: Clear to Ausculation Bilateral, NORMAL BREATHING PATTERN





- Cardiovascular Exam


Cardiovascular Exam: REGULAR RHYTHM, RRR, +S1, +S2.  absent: Murmur





- GI/Abdominal Exam


GI & Abdominal Exam: Soft, Normal Bowel Sounds.  absent: Tenderness





- Extremities Exam


Extremities Exam: Full ROM, Normal Capillary Refill, Normal Inspection.  absent

: Joint Swelling, Pedal Edema





- Back Exam


Back Exam: NORMAL INSPECTION





- Neurological Exam


Neurological Exam: Alert, Awake, CN II-XII Intact, Normal Gait, Oriented x3





- Psychiatric Exam


Psychiatric exam: Normal Affect, Normal Mood





- Skin


Skin Exam: Dry, Intact, Normal Color, Warm





Assessment and Plan


(1) DVT prophylaxis


Status: Chronic





(2) CAD (coronary artery disease)


Status: Chronic





(3) Smoking


Status: Chronic





(4) Low back pain


Status: Chronic





(5) Scrotal edema


Status: Chronic





(6) Prediabetes


Status: Chronic





(7) Neurocognitive disorder


Status: Chronic





(8) Leg edema, right


Status: Acute








- Assessment and Plan (Free Text)


Assessment: 


(1) DVT prophylaxis


Assessment & Plan: 


scd and ae hsoe


ambualtion


Status: Chronic





(2) CAD (coronary artery disease)


Assessment & Plan: 


cont meds


s/p cardiac arrest


Status: Chronic





(3) Smoking


Assessment & Plan: 


wean off nicoderm


Status: Chronic





(4) Low back pain


Assessment & Plan: 


ibuprofen rpn


Status: Chronic





(5) Scrotal edema


Status: Chronic





(6) Prediabetes


Assessment & Plan: 


dietary control


actiovity


Status: Chronic

## 2016-11-07 RX ADMIN — DIVALPROEX SODIUM SCH MG: 250 TABLET, DELAYED RELEASE ORAL at 16:28

## 2016-11-07 RX ADMIN — DIVALPROEX SODIUM SCH MG: 250 TABLET, DELAYED RELEASE ORAL at 09:23

## 2016-11-07 NOTE — CP.PCM.PN
Subjective





- Date & Time of Evaluation


Date of Evaluation: 11/07/16


Time of Evaluation: 08:14





- Subjective


Subjective: 


no compalints/distress. no f/c, n/v/d


pendign placement. coordinating w/ guardian





Objective





- Vital Signs/Intake and Output


Vital Signs (last 24 hours): 


 











Temp Pulse Resp BP Pulse Ox


 


 97.8 F   69   20   127/71   97 


 


 11/07/16 07:24  11/07/16 07:24  11/07/16 07:24  11/07/16 07:24  11/07/16 07:24











- Medications


Medications: 


 Current Medications





Aspirin (Ecotrin)  81 mg PO DAILY [facility]


   Last Admin: 11/06/16 09:02 Dose:  81 mg


Divalproex Sodium (Depakote Dr(*Bid*))  500 mg PO BID [facility]


   Last Admin: 11/06/16 16:47 Dose:  500 mg


Enalapril Maleate (Vasotec)  10 mg PO DAILY [facility]


   Last Admin: 11/06/16 09:03 Dose:  10 mg


Famotidine (Pepcid)  20 mg PO BID [facility]


   Last Admin: 11/06/16 16:48 Dose:  20 mg


Ibuprofen (Motrin Tab)  600 mg PO Q8 PRN


   PRN Reason: Pain, moderate (4-7)


   Last Admin: 10/31/16 08:42 Dose:  600 mg


Metoprolol Tartrate (Lopressor)  50 mg PO Q12 [facility]


   Last Admin: 11/06/16 21:37 Dose:  50 mg


Quetiapine Fumarate (Seroquel)  25 mg PO HS [facility]


   Last Admin: 11/06/16 21:37 Dose:  25 mg











- Labs


Labs: 


 





 08/02/16 08:10 





 08/02/16 08:10 





 











PT  11.2 SECONDS (9.4-12.0)   12/14/15  05:20    


 


INR  1.05  (0.89-1.20)   12/14/15  05:20    


 


APTT  36.2 SECONDS (23.1-32.0)  H  12/14/15  05:20    














- Constitutional


Appears: Well, Non-toxic, No Acute Distress





- Head Exam


Head Exam: ATRAUMATIC, NORMAL INSPECTION, NORMOCEPHALIC





- Eye Exam


Eye Exam: EOMI, Normal appearance, PERRL


Pupil Exam: NORMAL ACCOMODATION, PERRL





- ENT Exam


ENT Exam: Mucous Membranes Moist, Normal Exam





- Neck Exam


Neck Exam: Full ROM, Normal Inspection.  absent: Lymphadenopathy





- Respiratory Exam


Respiratory Exam: Clear to Ausculation Bilateral, NORMAL BREATHING PATTERN





- Cardiovascular Exam


Cardiovascular Exam: REGULAR RHYTHM, RRR, +S1, +S2.  absent: Murmur





- GI/Abdominal Exam


GI & Abdominal Exam: Soft, Normal Bowel Sounds.  absent: Tenderness





- Extremities Exam


Extremities Exam: Full ROM, Normal Capillary Refill, Normal Inspection.  absent

: Joint Swelling, Pedal Edema





- Back Exam


Back Exam: NORMAL INSPECTION





- Neurological Exam


Neurological Exam: Alert, Awake, CN II-XII Intact, Normal Gait, Oriented x3





- Psychiatric Exam


Psychiatric exam: Normal Affect, Normal Mood





- Skin


Skin Exam: Dry, Intact, Normal Color, Warm





Assessment and Plan


(1) DVT prophylaxis


Status: Chronic





(2) CAD (coronary artery disease)


Status: Chronic





(3) Smoking


Status: Chronic





(4) Low back pain


Status: Chronic





(5) Scrotal edema


Status: Chronic





(6) Prediabetes


Status: Chronic





(7) Neurocognitive disorder


Status: Chronic





(8) Leg edema, right


Status: Acute








- Assessment and Plan (Free Text)


Assessment: 


(1) DVT prophylaxis


Assessment & Plan: 


scd and ae hose


ambulation


Status: Chronic





(2) CAD (coronary artery disease)


Assessment & Plan: 


cont meds


Status: Chronic





(3) Smoking


Assessment & Plan: 


nicoderm


Status: Chronic





(4) Low back pain


Assessment & Plan: 


ibuprofen prn


Status: Chronic





(5) Scrotal edema


Status: Chronic





(6) Prediabetes


Assessment & Plan: 


dietary


Status: Chronic





(7) Neurocognitive disorder


Assessment & Plan: 


cont med


spendign placement


Status: Chronic

## 2016-11-08 RX ADMIN — DIVALPROEX SODIUM SCH MG: 250 TABLET, DELAYED RELEASE ORAL at 16:25

## 2016-11-08 RX ADMIN — DIVALPROEX SODIUM SCH MG: 250 TABLET, DELAYED RELEASE ORAL at 08:42

## 2016-11-08 NOTE — CP.PCM.PN
Subjective





- Date & Time of Evaluation


Date of Evaluation: 11/08/16


Time of Evaluation: 07:28





- Subjective


Subjective: 


no complaints/diustress


no f/c, n/v/d


pending placement





Objective





- Vital Signs/Intake and Output


Vital Signs (last 24 hours): 


 











Temp Pulse Resp BP Pulse Ox


 


 97.9 F   81   18   131/50 L  99 


 


 11/07/16 20:23  11/07/16 21:39  11/07/16 20:23  11/07/16 21:39  11/07/16 20:23











- Medications


Medications: 


 Current Medications





Aspirin (Ecotrin)  81 mg PO DAILY [facility]


   Last Admin: 11/07/16 09:23 Dose:  81 mg


Divalproex Sodium (Depakote Dr(*Bid*))  500 mg PO BID [facility]


   Last Admin: 11/07/16 16:28 Dose:  500 mg


Enalapril Maleate (Vasotec)  10 mg PO DAILY [facility]


   Last Admin: 11/07/16 09:24 Dose:  10 mg


Famotidine (Pepcid)  20 mg PO BID [facility]


   Last Admin: 11/07/16 16:28 Dose:  20 mg


Ibuprofen (Motrin Tab)  600 mg PO Q8 PRN


   PRN Reason: Pain, moderate (4-7)


   Last Admin: 10/31/16 08:42 Dose:  600 mg


Metoprolol Tartrate (Lopressor)  50 mg PO Q12 [facility]


   Last Admin: 11/07/16 21:39 Dose:  50 mg


Quetiapine Fumarate (Seroquel)  25 mg PO HS [facility]


   Last Admin: 11/07/16 21:43 Dose:  25 mg











- Labs


Labs: 


 





 08/02/16 08:10 





 08/02/16 08:10 





 











PT  11.2 SECONDS (9.4-12.0)   12/14/15  05:20    


 


INR  1.05  (0.89-1.20)   12/14/15  05:20    


 


APTT  36.2 SECONDS (23.1-32.0)  H  12/14/15  05:20    














- Constitutional


Appears: Well, Non-toxic, No Acute Distress





- Head Exam


Head Exam: ATRAUMATIC, NORMAL INSPECTION, NORMOCEPHALIC





- Eye Exam


Eye Exam: EOMI, Normal appearance, PERRL


Pupil Exam: NORMAL ACCOMODATION, PERRL





- ENT Exam


ENT Exam: Mucous Membranes Moist, Normal Exam





- Neck Exam


Neck Exam: Full ROM, Normal Inspection.  absent: Lymphadenopathy





- Respiratory Exam


Respiratory Exam: Clear to Ausculation Bilateral, NORMAL BREATHING PATTERN





- Cardiovascular Exam


Cardiovascular Exam: REGULAR RHYTHM, RRR, +S1, +S2.  absent: Murmur





- GI/Abdominal Exam


GI & Abdominal Exam: Soft, Normal Bowel Sounds.  absent: Tenderness





- Extremities Exam


Extremities Exam: Full ROM, Normal Capillary Refill, Normal Inspection.  absent

: Joint Swelling, Pedal Edema





- Back Exam


Back Exam: NORMAL INSPECTION





- Neurological Exam


Neurological Exam: Alert, Awake, CN II-XII Intact, Normal Gait, Oriented x3





- Psychiatric Exam


Psychiatric exam: Normal Affect, Normal Mood





- Skin


Skin Exam: Dry, Intact, Normal Color, Warm





Assessment and Plan


(1) DVT prophylaxis


Status: Chronic





(2) CAD (coronary artery disease)


Status: Chronic





(3) Smoking


Status: Chronic





(4) Low back pain


Status: Chronic





(5) Scrotal edema


Status: Chronic





(6) Prediabetes


Status: Chronic





(7) Neurocognitive disorder


Status: Chronic





(8) Leg edema, right


Status: Acute








- Assessment and Plan (Free Text)


Assessment: 


(1) DVT prophylaxis


Assessment & Plan: 


scd and ae hose


ambulation


Status: Chronic





(2) CAD (coronary artery disease)


Assessment & Plan: 


cont meds


Status: Chronic





(3) Smoking


Assessment & Plan: 


nicoderm


Status: Chronic





(4) Low back pain


Assessment & Plan: 


ibuprofen prn


Status: Chronic





(5) Scrotal edema


Status: Chronic





(6) Prediabetes


Assessment & Plan: 


dietary


Status: Chronic





(7) Neurocognitive disorder


Assessment & Plan: 


cont med


spendign placement


Status: Chronic

## 2016-11-09 RX ADMIN — DIVALPROEX SODIUM SCH MG: 250 TABLET, DELAYED RELEASE ORAL at 08:41

## 2016-11-09 RX ADMIN — DIVALPROEX SODIUM SCH MG: 250 TABLET, DELAYED RELEASE ORAL at 16:35

## 2016-11-09 NOTE — CP.PCM.PN
Subjective





- Date & Time of Evaluation


Date of Evaluation: 11/09/16


Time of Evaluation: 06:59





- Subjective


Subjective: 


doing well


no compalints offered


 no distress


no f/c n/v/d


pending palcement





Objective





- Vital Signs/Intake and Output


Vital Signs (last 24 hours): 


 











Temp Pulse Resp BP Pulse Ox


 


 98.1 F   90   20   101/73   97 


 


 11/08/16 21:56  11/08/16 21:56  11/08/16 21:56  11/08/16 21:56  11/08/16 21:56











- Medications


Medications: 


 Current Medications





Aspirin (Ecotrin)  81 mg PO DAILY [facility]


   Last Admin: 11/08/16 08:41 Dose:  81 mg


Divalproex Sodium (Depakote Dr(*Bid*))  500 mg PO BID [facility]


   Last Admin: 11/08/16 16:25 Dose:  500 mg


Enalapril Maleate (Vasotec)  10 mg PO DAILY [facility]


   Last Admin: 11/08/16 08:42 Dose:  10 mg


Famotidine (Pepcid)  20 mg PO BID [facility]


   Last Admin: 11/08/16 16:25 Dose:  20 mg


Ibuprofen (Motrin Tab)  600 mg PO Q8 PRN


   PRN Reason: Pain, moderate (4-7)


   Last Admin: 10/31/16 08:42 Dose:  600 mg


Metoprolol Tartrate (Lopressor)  50 mg PO Q12 [facility]


   Last Admin: 11/08/16 21:24 Dose:  50 mg


Quetiapine Fumarate (Seroquel)  25 mg PO HS [facility]


   Last Admin: 11/08/16 21:26 Dose:  25 mg











- Labs


Labs: 


 





 08/02/16 08:10 





 08/02/16 08:10 





 











PT  11.2 SECONDS (9.4-12.0)   12/14/15  05:20    


 


INR  1.05  (0.89-1.20)   12/14/15  05:20    


 


APTT  36.2 SECONDS (23.1-32.0)  H  12/14/15  05:20    














- Constitutional


Appears: Well, Non-toxic, No Acute Distress





- Head Exam


Head Exam: ATRAUMATIC, NORMAL INSPECTION, NORMOCEPHALIC





- Eye Exam


Eye Exam: EOMI, Normal appearance, PERRL


Pupil Exam: NORMAL ACCOMODATION, PERRL





- ENT Exam


ENT Exam: Mucous Membranes Moist, Normal Exam





- Neck Exam


Neck Exam: Full ROM, Normal Inspection.  absent: Lymphadenopathy





- Respiratory Exam


Respiratory Exam: Clear to Ausculation Bilateral, NORMAL BREATHING PATTERN





- Cardiovascular Exam


Cardiovascular Exam: REGULAR RHYTHM, RRR, +S1, +S2.  absent: Murmur





- GI/Abdominal Exam


GI & Abdominal Exam: Soft, Normal Bowel Sounds.  absent: Tenderness





-  Exam


External exam: NORMAL EXTERNAL EXAM





- Extremities Exam


Extremities Exam: Full ROM, Normal Capillary Refill, Normal Inspection.  absent

: Joint Swelling, Pedal Edema





- Back Exam


Back Exam: NORMAL INSPECTION





- Neurological Exam


Neurological Exam: Alert, Awake, CN II-XII Intact, Normal Gait, Oriented x3





- Psychiatric Exam


Psychiatric exam: Normal Affect, Normal Mood





- Skin


Skin Exam: Dry, Intact, Normal Color, Warm





Assessment and Plan


(1) DVT prophylaxis


Status: Chronic





(2) CAD (coronary artery disease)


Status: Chronic





(3) Smoking


Status: Chronic





(4) Low back pain


Status: Chronic





(5) Scrotal edema


Status: Chronic





(6) Prediabetes


Status: Chronic





(7) Neurocognitive disorder


Status: Chronic





(8) Leg edema, right


Status: Acute








- Assessment and Plan (Free Text)


Assessment: 


(1) DVT prophylaxis


Assessment & Plan: 


scd and ae hose


ambulation


Status: Chronic





(2) CAD (coronary artery disease)


Assessment & Plan: 


cont meds


Status: Chronic





(3) Smoking


Assessment & Plan: 


nicoderm


Status: Chronic





(4) Low back pain


Assessment & Plan: 


ibuprofen prn


Status: Chronic





(5) Scrotal edema


Status: Chronic





(6) Prediabetes


Assessment & Plan: 


dietary


Status: Chronic





(7) Neurocognitive disorder


Assessment & Plan: 


cont med


spendign placement


Status: Chronic

## 2016-11-10 RX ADMIN — DIVALPROEX SODIUM SCH MG: 250 TABLET, DELAYED RELEASE ORAL at 08:51

## 2016-11-10 RX ADMIN — DIVALPROEX SODIUM SCH MG: 250 TABLET, DELAYED RELEASE ORAL at 17:11

## 2016-11-10 NOTE — CP.PCM.PN
Subjective





- Date & Time of Evaluation


Date of Evaluation: 11/10/16


Time of Evaluation: 06:40





- Subjective


Subjective: 


no complaintsdistress


no f/c, n/v/d


pending placement





Objective





- Vital Signs/Intake and Output


Vital Signs (last 24 hours): 


 











Temp Pulse Resp BP Pulse Ox


 


 98.1 F   90   20   142/89   99 


 


 11/09/16 20:36  11/09/16 21:09  11/09/16 20:36  11/09/16 21:09  11/09/16 20:36











- Medications


Medications: 


 Current Medications





Aspirin (Ecotrin)  81 mg PO DAILY [facility]


   Last Admin: 11/09/16 08:41 Dose:  81 mg


Divalproex Sodium (Depakote Dr(*Bid*))  500 mg PO BID [facility]


   Last Admin: 11/09/16 16:35 Dose:  500 mg


Enalapril Maleate (Vasotec)  10 mg PO DAILY [facility]


   Last Admin: 11/09/16 08:41 Dose:  10 mg


Famotidine (Pepcid)  20 mg PO BID [facility]


   Last Admin: 11/09/16 16:35 Dose:  20 mg


Ibuprofen (Motrin Tab)  600 mg PO Q8 PRN


   PRN Reason: Pain, moderate (4-7)


   Last Admin: 10/31/16 08:42 Dose:  600 mg


Metoprolol Tartrate (Lopressor)  50 mg PO Q12 [facility]


   Last Admin: 11/09/16 21:09 Dose:  50 mg


Quetiapine Fumarate (Seroquel)  25 mg PO HS [facility]


   Last Admin: 11/09/16 21:09 Dose:  25 mg











- Labs


Labs: 


 





 08/02/16 08:10 





 08/02/16 08:10 





 











PT  11.2 SECONDS (9.4-12.0)   12/14/15  05:20    


 


INR  1.05  (0.89-1.20)   12/14/15  05:20    


 


APTT  36.2 SECONDS (23.1-32.0)  H  12/14/15  05:20    














- Constitutional


Appears: Well, Non-toxic, No Acute Distress





- Head Exam


Head Exam: ATRAUMATIC, NORMAL INSPECTION, NORMOCEPHALIC





- Eye Exam


Eye Exam: EOMI, Normal appearance, PERRL


Pupil Exam: NORMAL ACCOMODATION, PERRL





- ENT Exam


ENT Exam: Mucous Membranes Moist, Normal Exam





- Neck Exam


Neck Exam: Full ROM, Normal Inspection.  absent: Lymphadenopathy





- Respiratory Exam


Respiratory Exam: Clear to Ausculation Bilateral, NORMAL BREATHING PATTERN





- Cardiovascular Exam


Cardiovascular Exam: REGULAR RHYTHM, RRR, +S1, +S2.  absent: Murmur





- GI/Abdominal Exam


GI & Abdominal Exam: Soft, Normal Bowel Sounds.  absent: Tenderness





- Extremities Exam


Extremities Exam: Full ROM, Normal Capillary Refill, Normal Inspection.  absent

: Joint Swelling, Pedal Edema





- Back Exam


Back Exam: NORMAL INSPECTION





- Neurological Exam


Neurological Exam: Alert, Awake, CN II-XII Intact, Normal Gait, Oriented x3





- Psychiatric Exam


Psychiatric exam: Normal Affect, Normal Mood





- Skin


Skin Exam: Dry, Intact, Normal Color, Warm





Assessment and Plan


(1) DVT prophylaxis


Status: Chronic





(2) CAD (coronary artery disease)


Status: Chronic





(3) Smoking


Status: Chronic





(4) Low back pain


Status: Chronic





(5) Scrotal edema


Status: Chronic





(6) Prediabetes


Status: Chronic





(7) Neurocognitive disorder


Status: Chronic





(8) Leg edema, right


Status: Acute








- Assessment and Plan (Free Text)


Assessment: 


(1) DVT prophylaxis


Assessment & Plan: 


scd and ae hose


ambulation


Status: Chronic





(2) CAD (coronary artery disease)


Assessment & Plan: 


cont meds


Status: Chronic





(3) Smoking


Assessment & Plan: 


nicoderm


Status: Chronic





(4) Low back pain


Assessment & Plan: 


ibuprofen prn


Status: Chronic





(5) Scrotal edema


Status: Chronic





(6) Prediabetes


Assessment & Plan: 


dietary


Status: Chronic





(7) Neurocognitive disorder


Assessment & Plan: 


cont med


spendign placement


Status: Chronic

## 2016-11-11 RX ADMIN — DIVALPROEX SODIUM SCH MG: 250 TABLET, DELAYED RELEASE ORAL at 16:12

## 2016-11-11 RX ADMIN — DIVALPROEX SODIUM SCH MG: 250 TABLET, DELAYED RELEASE ORAL at 08:37

## 2016-11-11 NOTE — CP.PCM.PN
Subjective





- Date & Time of Evaluation


Date of Evaluation: 11/11/16


Time of Evaluation: 09:17





- Subjective


Subjective: 


no f/c n/v/d


no complaints/distress


pending placement





Objective





- Vital Signs/Intake and Output


Vital Signs (last 24 hours): 


 











Temp Pulse Resp BP Pulse Ox


 


 97.4 F L  71   20   112/76   100 


 


 11/11/16 08:41  11/11/16 08:41  11/11/16 08:41  11/11/16 08:41  11/11/16 08:41











- Medications


Medications: 


 Current Medications





Aspirin (Ecotrin)  81 mg PO DAILY [facility]


   Last Admin: 11/11/16 08:37 Dose:  81 mg


Divalproex Sodium (Depakote Dr(*Bid*))  500 mg PO BID [facility]


   Last Admin: 11/11/16 08:37 Dose:  500 mg


Enalapril Maleate (Vasotec)  10 mg PO DAILY [facility]


   Last Admin: 11/11/16 08:37 Dose:  10 mg


Famotidine (Pepcid)  20 mg PO BID [facility]


   Last Admin: 11/11/16 08:37 Dose:  20 mg


Ibuprofen (Motrin Tab)  600 mg PO Q8 PRN


   PRN Reason: Pain, moderate (4-7)


   Last Admin: 10/31/16 08:42 Dose:  600 mg


Metoprolol Tartrate (Lopressor)  50 mg PO Q12 [facility]


   Last Admin: 11/11/16 08:37 Dose:  50 mg


Quetiapine Fumarate (Seroquel)  25 mg PO HS [facility]


   Last Admin: 11/10/16 21:01 Dose:  25 mg











- Labs


Labs: 


 





 08/02/16 08:10 





 08/02/16 08:10 





 











PT  11.2 SECONDS (9.4-12.0)   12/14/15  05:20    


 


INR  1.05  (0.89-1.20)   12/14/15  05:20    


 


APTT  36.2 SECONDS (23.1-32.0)  H  12/14/15  05:20    














- Constitutional


Appears: Well, Non-toxic, No Acute Distress





- Head Exam


Head Exam: ATRAUMATIC, NORMAL INSPECTION, NORMOCEPHALIC





- Eye Exam


Eye Exam: EOMI, Normal appearance, PERRL


Pupil Exam: NORMAL ACCOMODATION, PERRL





- ENT Exam


ENT Exam: Mucous Membranes Moist, Normal Exam





- Neck Exam


Neck Exam: Full ROM, Normal Inspection.  absent: Lymphadenopathy





- Respiratory Exam


Respiratory Exam: Clear to Ausculation Bilateral, NORMAL BREATHING PATTERN





- Cardiovascular Exam


Cardiovascular Exam: REGULAR RHYTHM, RRR, +S1, +S2.  absent: Murmur





- GI/Abdominal Exam


GI & Abdominal Exam: Soft, Normal Bowel Sounds.  absent: Tenderness





- Extremities Exam


Extremities Exam: Full ROM, Normal Capillary Refill, Normal Inspection.  absent

: Joint Swelling, Pedal Edema





- Back Exam


Back Exam: NORMAL INSPECTION





- Neurological Exam


Neurological Exam: Alert, Awake, CN II-XII Intact, Normal Gait, Oriented x3





- Psychiatric Exam


Psychiatric exam: Normal Affect, Normal Mood





- Skin


Skin Exam: Dry, Intact, Normal Color, Warm





Assessment and Plan


(1) DVT prophylaxis


Status: Chronic





(2) CAD (coronary artery disease)


Status: Chronic





(3) Smoking


Status: Chronic





(4) Low back pain


Status: Chronic





(5) Scrotal edema


Status: Chronic





(6) Prediabetes


Status: Chronic





(7) Neurocognitive disorder


Status: Chronic





(8) Leg edema, right


Status: Acute








- Assessment and Plan (Free Text)


Assessment: 


(1) DVT prophylaxis


Assessment & Plan: 


scd and ae hose


ambulation


Status: Chronic





(2) CAD (coronary artery disease)


Assessment & Plan: 


cont meds


Status: Chronic





(3) Smoking


Assessment & Plan: 


nicoderm


Status: Chronic





(4) Low back pain


Assessment & Plan: 


ibuprofen prn


Status: Chronic





(5) Scrotal edema


Status: Chronic





(6) Prediabetes


Assessment & Plan: 


dietary


Status: Chronic





(7) Neurocognitive disorder


Assessment & Plan: 


cont med


spendign placement


Status: Chronic

## 2016-11-12 RX ADMIN — DIVALPROEX SODIUM SCH MG: 250 TABLET, DELAYED RELEASE ORAL at 08:34

## 2016-11-12 RX ADMIN — DIVALPROEX SODIUM SCH MG: 250 TABLET, DELAYED RELEASE ORAL at 16:39

## 2016-11-12 NOTE — CP.PCM.PN
Subjective





- Date & Time of Evaluation


Date of Evaluation: 11/12/16


Time of Evaluation: 06:37





- Subjective


Subjective: 


no complaints/distress


no f/c, n/v/d


pending placement





Objective





- Vital Signs/Intake and Output


Vital Signs (last 24 hours): 


 











Temp Pulse Resp BP Pulse Ox


 


 98.1 F   80   18   131/78   100 


 


 11/11/16 16:10  11/11/16 21:28  11/11/16 16:10  11/11/16 21:28  11/11/16 16:10











- Medications


Medications: 


 Current Medications





Aspirin (Ecotrin)  81 mg PO DAILY [facility]


   Last Admin: 11/11/16 08:37 Dose:  81 mg


Divalproex Sodium (Depakote Dr(*Bid*))  500 mg PO BID [facility]


   Last Admin: 11/11/16 16:12 Dose:  500 mg


Enalapril Maleate (Vasotec)  10 mg PO DAILY [facility]


   Last Admin: 11/11/16 08:37 Dose:  10 mg


Famotidine (Pepcid)  20 mg PO BID [facility]


   Last Admin: 11/11/16 16:12 Dose:  20 mg


Ibuprofen (Motrin Tab)  600 mg PO Q8 PRN


   PRN Reason: Pain, moderate (4-7)


   Last Admin: 10/31/16 08:42 Dose:  600 mg


Metoprolol Tartrate (Lopressor)  50 mg PO Q12 [facility]


   Last Admin: 11/11/16 21:28 Dose:  50 mg


Quetiapine Fumarate (Seroquel)  25 mg PO HS [facility]


   Last Admin: 11/11/16 21:29 Dose:  25 mg











- Labs


Labs: 


 





 08/02/16 08:10 





 08/02/16 08:10 





 











PT  11.2 SECONDS (9.4-12.0)   12/14/15  05:20    


 


INR  1.05  (0.89-1.20)   12/14/15  05:20    


 


APTT  36.2 SECONDS (23.1-32.0)  H  12/14/15  05:20    














- Constitutional


Appears: Well, Non-toxic, No Acute Distress





- Head Exam


Head Exam: ATRAUMATIC, NORMAL INSPECTION, NORMOCEPHALIC





- Eye Exam


Eye Exam: EOMI, Normal appearance, PERRL


Pupil Exam: NORMAL ACCOMODATION, PERRL





- ENT Exam


ENT Exam: Mucous Membranes Moist, Normal Exam





- Neck Exam


Neck Exam: Full ROM, Normal Inspection.  absent: Lymphadenopathy





- Respiratory Exam


Respiratory Exam: Clear to Ausculation Bilateral, NORMAL BREATHING PATTERN





- Cardiovascular Exam


Cardiovascular Exam: REGULAR RHYTHM, RRR, +S1, +S2.  absent: Murmur





- GI/Abdominal Exam


GI & Abdominal Exam: Soft, Normal Bowel Sounds.  absent: Tenderness





- Extremities Exam


Extremities Exam: Full ROM, Normal Capillary Refill, Normal Inspection.  absent

: Joint Swelling, Pedal Edema





- Back Exam


Back Exam: NORMAL INSPECTION





- Neurological Exam


Neurological Exam: Alert, Awake, CN II-XII Intact, Normal Gait, Oriented x3





- Psychiatric Exam


Psychiatric exam: Normal Affect, Normal Mood





- Skin


Skin Exam: Dry, Intact, Normal Color, Warm





Assessment and Plan


(1) DVT prophylaxis


Status: Chronic





(2) CAD (coronary artery disease)


Status: Chronic





(3) Smoking


Status: Chronic





(4) Low back pain


Status: Chronic





(5) Scrotal edema


Status: Chronic





(6) Prediabetes


Status: Chronic





(7) Neurocognitive disorder


Status: Chronic





(8) Leg edema, right


Status: Acute








- Assessment and Plan (Free Text)


Assessment: 


(1) DVT prophylaxis


Assessment & Plan: 


scd and ae hose


ambulation


Status: Chronic





(2) CAD (coronary artery disease)


Assessment & Plan: 


cont meds


Status: Chronic





(3) Smoking


Assessment & Plan: 


nicoderm


Status: Chronic





(4) Low back pain


Assessment & Plan: 


ibuprofen prn


Status: Chronic





(5) Scrotal edema


Status: Chronic





(6) Prediabetes


Assessment & Plan: 


dietary


Status: Chronic





(7) Neurocognitive disorder


Assessment & Plan: 


cont med


spendign placement


Status: Chronic

## 2016-11-13 RX ADMIN — DIVALPROEX SODIUM SCH MG: 250 TABLET, DELAYED RELEASE ORAL at 08:36

## 2016-11-13 RX ADMIN — DIVALPROEX SODIUM SCH MG: 250 TABLET, DELAYED RELEASE ORAL at 15:59

## 2016-11-13 NOTE — CP.PCM.PN
Subjective





- Date & Time of Evaluation


Date of Evaluation: 11/13/16


Time of Evaluation: 09:38





- Subjective


Subjective: 


no compliants/distress


no f/c, n/v/d


pending placement





Objective





- Vital Signs/Intake and Output


Vital Signs (last 24 hours): 


 











Temp Pulse Resp BP Pulse Ox


 


 97.3 F L  85   20   100/66   96 


 


 11/13/16 07:45  11/13/16 07:45  11/13/16 07:45  11/13/16 07:45  11/13/16 07:45











- Medications


Medications: 


 Current Medications





Aspirin (Ecotrin)  81 mg PO DAILY [facility]


   Last Admin: 11/13/16 08:37 Dose:  81 mg


Divalproex Sodium (Depakote Dr(*Bid*))  500 mg PO BID [facility]


   Last Admin: 11/13/16 08:36 Dose:  500 mg


Enalapril Maleate (Vasotec)  10 mg PO DAILY [facility]


   Last Admin: 11/13/16 08:37 Dose:  10 mg


Famotidine (Pepcid)  20 mg PO BID [facility]


   Last Admin: 11/13/16 08:37 Dose:  20 mg


Ibuprofen (Motrin Tab)  600 mg PO Q8 PRN


   PRN Reason: Pain, moderate (4-7)


   Last Admin: 10/31/16 08:42 Dose:  600 mg


Metoprolol Tartrate (Lopressor)  50 mg PO Q12 [facility]


   Last Admin: 11/13/16 08:36 Dose:  50 mg


Quetiapine Fumarate (Seroquel)  25 mg PO HS [facility]


   Last Admin: 11/12/16 21:12 Dose:  25 mg











- Labs


Labs: 


 





 08/02/16 08:10 





 08/02/16 08:10 





 











PT  11.2 SECONDS (9.4-12.0)   12/14/15  05:20    


 


INR  1.05  (0.89-1.20)   12/14/15  05:20    


 


APTT  36.2 SECONDS (23.1-32.0)  H  12/14/15  05:20    














- Constitutional


Appears: Well, Non-toxic, No Acute Distress





- Head Exam


Head Exam: ATRAUMATIC, NORMAL INSPECTION, NORMOCEPHALIC





- Eye Exam


Eye Exam: EOMI, Normal appearance, PERRL


Pupil Exam: NORMAL ACCOMODATION, PERRL





- ENT Exam


ENT Exam: Mucous Membranes Moist, Normal Exam





- Neck Exam


Neck Exam: Full ROM, Normal Inspection.  absent: Lymphadenopathy





- Respiratory Exam


Respiratory Exam: Clear to Ausculation Bilateral, NORMAL BREATHING PATTERN





- Cardiovascular Exam


Cardiovascular Exam: REGULAR RHYTHM, RRR, +S1, +S2.  absent: Murmur





- GI/Abdominal Exam


GI & Abdominal Exam: Soft, Normal Bowel Sounds.  absent: Tenderness





- Extremities Exam


Extremities Exam: Full ROM, Normal Capillary Refill, Normal Inspection.  absent

: Joint Swelling, Pedal Edema





- Back Exam


Back Exam: NORMAL INSPECTION





- Neurological Exam


Neurological Exam: Alert, Awake, CN II-XII Intact, Normal Gait, Oriented x3





- Psychiatric Exam


Psychiatric exam: Normal Affect, Normal Mood





- Skin


Skin Exam: Dry, Intact, Normal Color, Warm





Assessment and Plan


(1) DVT prophylaxis


Status: Chronic





(2) CAD (coronary artery disease)


Status: Chronic





(3) Smoking


Status: Chronic





(4) Low back pain


Status: Chronic





(5) Scrotal edema


Status: Chronic





(6) Prediabetes


Status: Chronic





(7) Neurocognitive disorder


Status: Chronic





(8) Leg edema, right


Status: Acute








- Assessment and Plan (Free Text)


Assessment: 


(1) DVT prophylaxis


Assessment & Plan: 


scd and ae hose


ambulation


Status: Chronic





(2) CAD (coronary artery disease)


Assessment & Plan: 


cont meds


Status: Chronic





(3) Smoking


Assessment & Plan: 


nicoderm


Status: Chronic





(4) Low back pain


Assessment & Plan: 


ibuprofen prn


Status: Chronic





(5) Scrotal edema


Status: Chronic





(6) Prediabetes


Assessment & Plan: 


dietary


Status: Chronic





(7) Neurocognitive disorder


Assessment & Plan: 


cont med


spendign placement


Status: Chronic

## 2016-11-14 RX ADMIN — DIVALPROEX SODIUM SCH MG: 250 TABLET, DELAYED RELEASE ORAL at 08:52

## 2016-11-14 RX ADMIN — DIVALPROEX SODIUM SCH MG: 250 TABLET, DELAYED RELEASE ORAL at 16:54

## 2016-11-14 NOTE — CP.PCM.PN
Subjective





- Date & Time of Evaluation


Date of Evaluation: 11/14/16


Time of Evaluation: 07:26





- Subjective


Subjective: 


no complaitns/distress


no f/c, n/v/d


pending placement





Objective





- Vital Signs/Intake and Output


Vital Signs (last 24 hours): 


 











Temp Pulse Resp BP Pulse Ox


 


 97.9 F   74   18   104/61   95 


 


 11/13/16 23:32  11/13/16 23:32  11/13/16 23:32  11/13/16 23:32  11/13/16 23:32











- Medications


Medications: 


 Current Medications





Aspirin (Ecotrin)  81 mg PO DAILY [facility]


   Last Admin: 11/13/16 08:37 Dose:  81 mg


Divalproex Sodium (Depakote Dr(*Bid*))  500 mg PO BID [facility]


   Last Admin: 11/13/16 15:59 Dose:  500 mg


Enalapril Maleate (Vasotec)  10 mg PO DAILY [facility]


   Last Admin: 11/13/16 08:37 Dose:  10 mg


Famotidine (Pepcid)  20 mg PO BID [facility]


   Last Admin: 11/13/16 15:59 Dose:  20 mg


Ibuprofen (Motrin Tab)  600 mg PO Q8 PRN


   PRN Reason: Pain, moderate (4-7)


   Last Admin: 10/31/16 08:42 Dose:  600 mg


Metoprolol Tartrate (Lopressor)  50 mg PO Q12 [facility]


   Last Admin: 11/13/16 21:00 Dose:  50 mg


Quetiapine Fumarate (Seroquel)  25 mg PO HS [facility]


   Last Admin: 11/13/16 20:59 Dose:  25 mg











- Labs


Labs: 


 





 08/02/16 08:10 





 08/02/16 08:10 





 











PT  11.2 SECONDS (9.4-12.0)   12/14/15  05:20    


 


INR  1.05  (0.89-1.20)   12/14/15  05:20    


 


APTT  36.2 SECONDS (23.1-32.0)  H  12/14/15  05:20    














- Constitutional


Appears: Well, Non-toxic, No Acute Distress





- Head Exam


Head Exam: ATRAUMATIC, NORMAL INSPECTION, NORMOCEPHALIC





- Eye Exam


Eye Exam: EOMI, Normal appearance, PERRL


Pupil Exam: NORMAL ACCOMODATION, PERRL





- ENT Exam


ENT Exam: Mucous Membranes Moist, Normal Exam





- Neck Exam


Neck Exam: Full ROM, Normal Inspection.  absent: Lymphadenopathy





- Respiratory Exam


Respiratory Exam: Clear to Ausculation Bilateral, NORMAL BREATHING PATTERN





- Cardiovascular Exam


Cardiovascular Exam: REGULAR RHYTHM, RRR, +S1, +S2.  absent: Murmur





- GI/Abdominal Exam


GI & Abdominal Exam: Soft, Normal Bowel Sounds.  absent: Tenderness





- Extremities Exam


Extremities Exam: Full ROM, Normal Capillary Refill, Normal Inspection.  absent

: Joint Swelling, Pedal Edema





- Back Exam


Back Exam: NORMAL INSPECTION





- Neurological Exam


Neurological Exam: Alert, Awake, CN II-XII Intact, Normal Gait, Oriented x3





- Psychiatric Exam


Psychiatric exam: Normal Affect, Normal Mood





- Skin


Skin Exam: Dry, Intact, Normal Color, Warm





Assessment and Plan


(1) DVT prophylaxis


Status: Chronic





(2) CAD (coronary artery disease)


Status: Chronic





(3) Smoking


Status: Chronic





(4) Low back pain


Status: Chronic





(5) Scrotal edema


Status: Chronic





(6) Prediabetes


Status: Chronic





(7) Neurocognitive disorder


Status: Chronic





(8) Leg edema, right


Status: Acute








- Assessment and Plan (Free Text)


Assessment: 


(1) DVT prophylaxis


Assessment & Plan: 


scd and ae hose


ambulation


Status: Chronic





(2) CAD (coronary artery disease)


Assessment & Plan: 


cont meds


Status: Chronic





(3) Smoking


Assessment & Plan: 


nicoderm


Status: Chronic





(4) Low back pain


Assessment & Plan: 


ibuprofen prn


Status: Chronic





(5) Scrotal edema


Status: Chronic





(6) Prediabetes


Assessment & Plan: 


dietary


Status: Chronic





(7) Neurocognitive disorder


Assessment & Plan: 


cont med


spendign placement


Status: Chronic

## 2016-11-15 RX ADMIN — DIVALPROEX SODIUM SCH MG: 250 TABLET, DELAYED RELEASE ORAL at 08:28

## 2016-11-15 RX ADMIN — DIVALPROEX SODIUM SCH MG: 250 TABLET, DELAYED RELEASE ORAL at 16:09

## 2016-11-15 NOTE — CP.PCM.PN
Subjective





- Date & Time of Evaluation


Date of Evaluation: 11/15/16


Time of Evaluation: 07:32





- Subjective


Subjective: 


no f/c, n/v/d


pending placement


no complaint/distress





Objective





- Vital Signs/Intake and Output


Vital Signs (last 24 hours): 


 











Temp Pulse Resp BP Pulse Ox


 


 97.9 F   78   18   102/66   100 


 


 11/14/16 22:43  11/14/16 22:43  11/14/16 22:43  11/14/16 22:43  11/14/16 22:43











- Medications


Medications: 


 Current Medications





Aspirin (Ecotrin)  81 mg PO DAILY [facility]


   Last Admin: 11/14/16 08:52 Dose:  81 mg


Divalproex Sodium (Depakote Dr(*Bid*))  500 mg PO BID [facility]


   Last Admin: 11/14/16 16:54 Dose:  500 mg


Enalapril Maleate (Vasotec)  10 mg PO DAILY [facility]


   Last Admin: 11/14/16 08:52 Dose:  10 mg


Famotidine (Pepcid)  20 mg PO BID [facility]


   Last Admin: 11/14/16 16:55 Dose:  20 mg


Ibuprofen (Motrin Tab)  600 mg PO Q8 PRN


   PRN Reason: Pain, moderate (4-7)


   Last Admin: 10/31/16 08:42 Dose:  600 mg


Metoprolol Tartrate (Lopressor)  50 mg PO Q12 [facility]


   Last Admin: 11/14/16 21:23 Dose:  50 mg


Quetiapine Fumarate (Seroquel)  25 mg PO HS [facility]


   Last Admin: 11/14/16 21:24 Dose:  25 mg











- Labs


Labs: 


 





 08/02/16 08:10 





 08/02/16 08:10 





 











PT  11.2 SECONDS (9.4-12.0)   12/14/15  05:20    


 


INR  1.05  (0.89-1.20)   12/14/15  05:20    


 


APTT  36.2 SECONDS (23.1-32.0)  H  12/14/15  05:20    














- Constitutional


Appears: Well, Non-toxic, No Acute Distress





- Head Exam


Head Exam: ATRAUMATIC, NORMAL INSPECTION, NORMOCEPHALIC





- Eye Exam


Eye Exam: EOMI, Normal appearance, PERRL


Pupil Exam: NORMAL ACCOMODATION, PERRL





- ENT Exam


ENT Exam: Mucous Membranes Moist, Normal Exam





- Neck Exam


Neck Exam: Full ROM, Normal Inspection.  absent: Lymphadenopathy





- Respiratory Exam


Respiratory Exam: Clear to Ausculation Bilateral, NORMAL BREATHING PATTERN





- Cardiovascular Exam


Cardiovascular Exam: REGULAR RHYTHM, RRR, +S1, +S2.  absent: Murmur





- GI/Abdominal Exam


GI & Abdominal Exam: Soft, Normal Bowel Sounds.  absent: Tenderness





- Extremities Exam


Extremities Exam: Full ROM, Normal Capillary Refill, Normal Inspection.  absent

: Joint Swelling, Pedal Edema





- Back Exam


Back Exam: NORMAL INSPECTION





- Neurological Exam


Neurological Exam: Alert, Awake, CN II-XII Intact, Normal Gait, Oriented x3





- Psychiatric Exam


Psychiatric exam: Normal Affect, Normal Mood





- Skin


Skin Exam: Dry, Intact, Normal Color, Warm





Assessment and Plan


(1) DVT prophylaxis


Status: Chronic





(2) CAD (coronary artery disease)


Status: Chronic





(3) Smoking


Status: Chronic





(4) Low back pain


Status: Chronic





(5) Scrotal edema


Status: Chronic





(6) Prediabetes


Status: Chronic





(7) Neurocognitive disorder


Status: Chronic





(8) Leg edema, right


Status: Acute








- Assessment and Plan (Free Text)


Assessment: 


1) DVT prophylaxis


Assessment & Plan: 


scd and ae hose


ambulation


Status: Chronic





(2) CAD (coronary artery disease)


Assessment & Plan: 


cont meds


Status: Chronic





(3) Smoking


Assessment & Plan: 


nicoderm


Status: Chronic





(4) Low back pain


Assessment & Plan: 


ibuprofen prn


Status: Chronic





(5) Scrotal edema


Status: Chronic





(6) Prediabetes


Assessment & Plan: 


dietary


Status: Chronic





(7) Neurocognitive disorder


Assessment & Plan: 


cont med


spendign placement


Status: Chronic

## 2016-11-16 RX ADMIN — DIVALPROEX SODIUM SCH MG: 250 TABLET, DELAYED RELEASE ORAL at 16:26

## 2016-11-16 RX ADMIN — DIVALPROEX SODIUM SCH MG: 250 TABLET, DELAYED RELEASE ORAL at 09:09

## 2016-11-16 NOTE — CP.PCM.PN
Subjective





- Date & Time of Evaluation


Date of Evaluation: 11/16/16


Time of Evaluation: 07:30





- Subjective


Subjective: 


no complaitns/disterss/no f/c, n/v/d


pending placement





Objective





- Vital Signs/Intake and Output


Vital Signs (last 24 hours): 


 











Temp Pulse Resp BP Pulse Ox


 


 98.2 F   77   18   99/65 L  97 


 


 11/15/16 21:51  11/15/16 21:51  11/15/16 21:51  11/15/16 21:51  11/15/16 21:51











- Medications


Medications: 


 Current Medications





Aspirin (Ecotrin)  [facility]ILY [facility]


   Last Admin: 11/15/16 08:28 Dose:  81 mg


Divalproex Sodium (Depakote Dr(*Bid*))  500 mg PO BID [facility]


   Last Admin: 11/15/16 16:09 Dose:  500 mg


Enalapril Maleate (Vasotec)  10 mg PO DAILY [facility]


   Last Admin: 11/15/16 08:29 Dose:  10 mg


Famotidine (Pepcid)  20 mg PO BID [facility]


   Last Admin: 11/15/16 16:09 Dose:  20 mg


Ibuprofen (Motrin Tab)  600 mg PO Q8 PRN


   PRN Reason: Pain, moderate (4-7)


   Last Admin: 10/31/16 08:42 Dose:  600 mg


Metoprolol Tartrate (Lopressor)  50 mg PO Q12 [facility]


   Last Admin: 11/15/16 20:38 Dose:  50 mg


Quetiapine Fumarate (Seroquel)  25 mg PO HS [facility]


   Last Admin: 11/15/16 21:04 Dose:  25 mg











- Labs


Labs: 


 





 08/02/16 08:10 





 08/02/16 08:10 





 











PT  11.2 SECONDS (9.4-12.0)   12/14/15  05:20    


 


INR  1.05  (0.89-1.20)   12/14/15  05:20    


 


APTT  36.2 SECONDS (23.1-32.0)  H  12/14/15  05:20    














- Constitutional


Appears: Well, Non-toxic, No Acute Distress





- Head Exam


Head Exam: ATRAUMATIC, NORMAL INSPECTION, NORMOCEPHALIC





- Eye Exam


Eye Exam: EOMI, Normal appearance, PERRL


Pupil Exam: NORMAL ACCOMODATION, PERRL





- ENT Exam


ENT Exam: Mucous Membranes Moist, Normal Exam





- Neck Exam


Neck Exam: Full ROM, Normal Inspection.  absent: Lymphadenopathy





- Respiratory Exam


Respiratory Exam: Clear to Ausculation Bilateral, NORMAL BREATHING PATTERN





- Cardiovascular Exam


Cardiovascular Exam: REGULAR RHYTHM, RRR, +S1, +S2.  absent: Murmur





- GI/Abdominal Exam


GI & Abdominal Exam: Soft, Normal Bowel Sounds.  absent: Tenderness





- Extremities Exam


Extremities Exam: Full ROM, Normal Capillary Refill, Normal Inspection.  absent

: Joint Swelling, Pedal Edema





- Back Exam


Back Exam: NORMAL INSPECTION





- Neurological Exam


Neurological Exam: Alert, Awake, CN II-XII Intact, Normal Gait, Oriented x3





- Psychiatric Exam


Psychiatric exam: Normal Affect, Normal Mood





- Skin


Skin Exam: Dry, Intact, Normal Color, Warm





Assessment and Plan


(1) DVT prophylaxis


Status: Chronic





(2) CAD (coronary artery disease)


Status: Chronic





(3) Smoking


Status: Chronic





(4) Low back pain


Status: Chronic





(5) Scrotal edema


Status: Chronic





(6) Prediabetes


Status: Chronic





(7) Neurocognitive disorder


Status: Chronic





(8) Leg edema, right


Status: Acute








- Assessment and Plan (Free Text)


Assessment: 


1) DVT prophylaxis


Assessment & Plan: 


scd and ae hose


ambulation


Status: Chronic





(2) CAD (coronary artery disease)


Assessment & Plan: 


cont meds


Status: Chronic





(3) Smoking


Assessment & Plan: 


nicoderm


Status: Chronic





(4) Low back pain


Assessment & Plan: 


ibuprofen prn


Status: Chronic





(5) Scrotal edema


Status: Chronic





(6) Prediabetes


Assessment & Plan: 


dietary


Status: Chronic





(7) Neurocognitive disorder


Assessment & Plan: 


cont med


spendign placement


Status: Chronic

## 2016-11-17 RX ADMIN — DIVALPROEX SODIUM SCH MG: 250 TABLET, DELAYED RELEASE ORAL at 10:09

## 2016-11-17 RX ADMIN — DIVALPROEX SODIUM SCH MG: 250 TABLET, DELAYED RELEASE ORAL at 17:03

## 2016-11-17 NOTE — CP.PCM.PN
Subjective





- Date & Time of Evaluation


Date of Evaluation: 11/17/16


Time of Evaluation: 07:14





- Subjective


Subjective: 


no comlplaints/distress


no f/c, n/v/d


pending placement





Objective





- Vital Signs/Intake and Output


Vital Signs (last 24 hours): 


 











Temp Pulse Resp BP Pulse Ox


 


 98.1 F   75   18   116/70   98 


 


 11/16/16 22:31  11/16/16 22:31  11/16/16 22:31  11/16/16 22:31  11/16/16 22:31











- Medications


Medications: 


 Current Medications





Aspirin (Ecotrin)  81 mg PO DAILY [facility]


   Last Admin: 11/16/16 09:10 Dose:  81 mg


Divalproex Sodium (Depakote Dr(*Bid*))  500 mg PO BID [facility]


   Last Admin: 11/16/16 16:26 Dose:  500 mg


Enalapril Maleate (Vasotec)  10 mg PO DAILY [facility]


   Last Admin: 11/16/16 09:09 Dose:  10 mg


Famotidine (Pepcid)  20 mg PO BID [facility]


   Last Admin: 11/16/16 16:26 Dose:  20 mg


Ibuprofen (Motrin Tab)  600 mg PO Q8 PRN


   PRN Reason: Pain, moderate (4-7)


   Last Admin: 10/31/16 08:42 Dose:  600 mg


Metoprolol Tartrate (Lopressor)  50 mg PO Q12 [facility]


   Last Admin: 11/16/16 20:34 Dose:  50 mg


Quetiapine Fumarate (Seroquel)  25 mg PO HS [facility]


   Last Admin: 11/16/16 21:04 Dose:  25 mg











- Labs


Labs: 


 





 08/02/16 08:10 





 08/02/16 08:10 





 











PT  11.2 SECONDS (9.4-12.0)   12/14/15  05:20    


 


INR  1.05  (0.89-1.20)   12/14/15  05:20    


 


APTT  36.2 SECONDS (23.1-32.0)  H  12/14/15  05:20    














- Constitutional


Appears: Well, Non-toxic, No Acute Distress





- Head Exam


Head Exam: ATRAUMATIC, NORMAL INSPECTION, NORMOCEPHALIC





- Eye Exam


Eye Exam: EOMI, Normal appearance, PERRL


Pupil Exam: NORMAL ACCOMODATION, PERRL





- ENT Exam


ENT Exam: Mucous Membranes Moist, Normal Exam





- Neck Exam


Neck Exam: Full ROM, Normal Inspection.  absent: Lymphadenopathy





- Respiratory Exam


Respiratory Exam: Clear to Ausculation Bilateral, NORMAL BREATHING PATTERN





- Cardiovascular Exam


Cardiovascular Exam: REGULAR RHYTHM, +S1, +S2.  absent: Murmur





- GI/Abdominal Exam


GI & Abdominal Exam: Soft, Normal Bowel Sounds.  absent: Tenderness





- Extremities Exam


Extremities Exam: Full ROM, Normal Capillary Refill, Normal Inspection.  absent

: Joint Swelling, Pedal Edema





- Back Exam


Back Exam: NORMAL INSPECTION





- Neurological Exam


Neurological Exam: Alert, Awake, CN II-XII Intact, Normal Gait, Oriented x3





- Psychiatric Exam


Psychiatric exam: Normal Affect, Normal Mood





- Skin


Skin Exam: Dry, Intact, Normal Color, Warm





Assessment and Plan


(1) DVT prophylaxis


Status: Chronic





(2) CAD (coronary artery disease)


Status: Chronic





(3) Smoking


Status: Chronic





(4) Low back pain


Status: Chronic





(5) Scrotal edema


Status: Chronic





(6) Prediabetes


Status: Chronic





(7) Neurocognitive disorder


Status: Chronic





(8) Leg edema, right


Status: Acute








- Assessment and Plan (Free Text)


Assessment: 


1) DVT prophylaxis


Assessment & Plan: 


scd and ae hose


ambulation


Status: Chronic





(2) CAD (coronary artery disease)


Assessment & Plan: 


cont meds


Status: Chronic





(3) Smoking


Assessment & Plan: 


nicoderm


Status: Chronic





(4) Low back pain


Assessment & Plan: 


ibuprofen prn


Status: Chronic





(5) Scrotal edema


Status: Chronic





(6) Prediabetes


Assessment & Plan: 


dietary


Status: Chronic





(7) Neurocognitive disorder


Assessment & Plan: 


cont med


spendign placement


Status: Chronic

## 2016-11-18 RX ADMIN — DIVALPROEX SODIUM SCH MG: 500 TABLET, DELAYED RELEASE ORAL at 16:21

## 2016-11-18 RX ADMIN — DIVALPROEX SODIUM SCH MG: 500 TABLET, DELAYED RELEASE ORAL at 08:20

## 2016-11-18 NOTE — PN
DATE: 11/18/2016



The patient is evaluated in bed.  No complaints, no distress.  No fever, chills
, nausea, vomiting, diarrhea.  The patient remains pending placement.



REVIEW OF SYSTEMS:  Negative.



PHYSICAL EXAMINATION:

GENERAL:  Alert, oriented.

HEART:  Regular rate and rhythm.  No murmurs, rubs, or gallops.

LUNGS:  Clear in all fields bilaterally.

ABDOMEN:  Soft, nontender.  Bowel sounds x 4.

EXTREMITIES:  Distal pulses, motor, sensation intact.  Cap refill is brisk.



DIAGNOSES AND PLAN:

1.  Coronary artery disease, status post cardiac arrest.  Continue medication.

2.  Neurocognitive disorder, still remains pending guardianship placement in 
progress  

3.  Deep venous thrombosis prophylaxis, sequential compression devices, 
antiembolism hose, ambulation.

4.  Smoking cession.  The patient has been on Nicoderm, doing well.  

5.  Chronic intermittent low back pain, again doing well.  Ibuprofen p.r.n. 

6.  Prediabetes, continue dietary control.  We will continue to monitor patient 
through appropriate discharge.





__________________________________________

Fan SILVER







cc:   



DD: 11/18/2016 11:38:19  [id number]

TT: 11/18/2016 11:52:20

Confirmation # [id number]

Dictation # [id number]

tn

MTDD

## 2016-11-19 RX ADMIN — DIVALPROEX SODIUM SCH MG: 500 TABLET, DELAYED RELEASE ORAL at 15:59

## 2016-11-19 RX ADMIN — DIVALPROEX SODIUM SCH MG: 500 TABLET, DELAYED RELEASE ORAL at 08:02

## 2016-11-20 RX ADMIN — DIVALPROEX SODIUM SCH MG: 500 TABLET, DELAYED RELEASE ORAL at 10:22

## 2016-11-20 RX ADMIN — DIVALPROEX SODIUM SCH MG: 500 TABLET, DELAYED RELEASE ORAL at 16:25

## 2016-11-20 NOTE — CP.PCM.PN
Subjective





- Date & Time of Evaluation


Date of Evaluation: 11/20/16


Time of Evaluation: 10:07





- Subjective


Subjective: 


no complaints/distress


no f/c n/v/d


pending placement





Objective





- Vital Signs/Intake and Output


Vital Signs (last 24 hours): 


 











Temp Pulse Resp BP Pulse Ox


 


 97.8 F   75   20   95/68 L  99 


 


 11/20/16 08:44  11/20/16 08:44  11/20/16 08:44  11/20/16 08:44  11/20/16 08:44











- Medications


Medications: 


 Current Medications





Aspirin (Ecotrin)  81 mg PO DAILY [facility]


   Last Admin: 11/19/16 08:02 Dose:  81 mg


Divalproex Sodium (Depakote Dr(*Bid*))  500 mg PO BID [facility]


   Last Admin: 11/19/16 15:59 Dose:  500 mg


Enalapril Maleate (Vasotec)  10 mg PO DAILY [facility]


   Last Admin: 11/19/16 08:02 Dose:  10 mg


Famotidine (Pepcid)  20 mg PO BID [facility]


   Last Admin: 11/19/16 15:59 Dose:  20 mg


Ibuprofen (Motrin Tab)  600 mg PO Q8 PRN


   PRN Reason: Pain, moderate (4-7)


   Last Admin: 10/31/16 08:42 Dose:  600 mg


Metoprolol Tartrate (Lopressor)  50 mg PO Q12 [facility]


   Last Admin: 11/19/16 21:23 Dose:  50 mg


Quetiapine Fumarate (Seroquel)  25 mg PO HS [facility]


   Last Admin: 11/19/16 21:24 Dose:  25 mg











- Labs


Labs: 


 





 08/02/16 08:10 





 08/02/16 08:10 





 











PT  11.2 SECONDS (9.4-12.0)   12/14/15  05:20    


 


INR  1.05  (0.89-1.20)   12/14/15  05:20    


 


APTT  36.2 SECONDS (23.1-32.0)  H  12/14/15  05:20    














- Constitutional


Appears: Well, Non-toxic, No Acute Distress





- Head Exam


Head Exam: ATRAUMATIC, NORMAL INSPECTION, NORMOCEPHALIC





- Eye Exam


Eye Exam: EOMI, Normal appearance, PERRL


Pupil Exam: NORMAL ACCOMODATION, PERRL





- ENT Exam


ENT Exam: Mucous Membranes Moist, Normal Exam





- Neck Exam


Neck Exam: Full ROM, Normal Inspection.  absent: Lymphadenopathy





- Respiratory Exam


Respiratory Exam: Clear to Ausculation Bilateral, NORMAL BREATHING PATTERN





- Cardiovascular Exam


Cardiovascular Exam: REGULAR RHYTHM, RRR, +S1, +S2.  absent: Murmur





- GI/Abdominal Exam


GI & Abdominal Exam: Soft, Normal Bowel Sounds.  absent: Tenderness





- Extremities Exam


Extremities Exam: Full ROM, Normal Capillary Refill, Normal Inspection.  absent

: Joint Swelling, Pedal Edema





- Back Exam


Back Exam: NORMAL INSPECTION





- Neurological Exam


Neurological Exam: Alert, Awake, CN II-XII Intact, Normal Gait, Oriented x3





- Psychiatric Exam


Psychiatric exam: Normal Affect, Normal Mood





- Skin


Skin Exam: Dry, Intact, Normal Color, Warm





Assessment and Plan


(1) DVT prophylaxis


Status: Chronic





(2) CAD (coronary artery disease)


Status: Chronic





(3) Smoking


Status: Chronic





(4) Low back pain


Status: Chronic





(5) Scrotal edema


Status: Chronic





(6) Prediabetes


Status: Chronic





(7) Neurocognitive disorder


Status: Chronic





(8) Leg edema, right


Status: Acute








- Assessment and Plan (Free Text)


Assessment: 


(1) DVT prophylaxis


Assessment & Plan: 


scd and ae hose


ambulation


Status: Chronic





(2) CAD (coronary artery disease)


Assessment & Plan: 


cont meds


Status: Chronic





(3) Smoking


Assessment & Plan: 


nicoderm


Status: Chronic





(4) Low back pain


Assessment & Plan: 


ibuprofen prn


Status: Chronic





(5) Scrotal edema


Status: Chronic





(6) Prediabetes


Assessment & Plan: 


dietary


Status: Chronic





(7) Neurocognitive disorder


Assessment & Plan: 


cont med


spendign placement


Status: Chronic

## 2016-11-21 RX ADMIN — DIVALPROEX SODIUM SCH MG: 500 TABLET, DELAYED RELEASE ORAL at 17:36

## 2016-11-21 RX ADMIN — DIVALPROEX SODIUM SCH MG: 500 TABLET, DELAYED RELEASE ORAL at 09:20

## 2016-11-21 NOTE — CP.PCM.PN
Subjective





- Date & Time of Evaluation


Date of Evaluation: 11/21/16


Time of Evaluation: 06:35





- Subjective


Subjective: 


no f/c, n/v/d


no complaints/distress


pending placement





Objective





- Vital Signs/Intake and Output


Vital Signs (last 24 hours): 


 











Temp Pulse Resp BP Pulse Ox


 


 97 F L  72   18   129/77   96 


 


 11/20/16 23:41  11/20/16 23:41  11/20/16 23:41  11/20/16 23:41  11/20/16 23:41











- Medications


Medications: 


 Current Medications





Aspirin (Ecotrin)  81 mg PO DAILY [facility]


   Last Admin: 11/20/16 10:22 Dose:  81 mg


Divalproex Sodium (Depakote Dr(*Bid*))  500 mg PO BID [facility]


   Last Admin: 11/20/16 16:25 Dose:  500 mg


Enalapril Maleate (Vasotec)  10 mg PO DAILY [facility]


   Last Admin: 11/20/16 10:29 Dose:  Not Given


Famotidine (Pepcid)  20 mg PO BID [facility]


   Last Admin: 11/20/16 16:25 Dose:  20 mg


Ibuprofen (Motrin Tab)  600 mg PO Q8 PRN


   PRN Reason: Pain, moderate (4-7)


   Last Admin: 10/31/16 08:42 Dose:  600 mg


Metoprolol Tartrate (Lopressor)  50 mg PO Q12 [facility]


   Last Admin: 11/20/16 21:50 Dose:  50 mg


Quetiapine Fumarate (Seroquel)  25 mg PO HS [facility]


   Last Admin: 11/20/16 21:50 Dose:  25 mg











- Labs


Labs: 


 





 08/02/16 08:10 





 08/02/16 08:10 





 











PT  11.2 SECONDS (9.4-12.0)   12/14/15  05:20    


 


INR  1.05  (0.89-1.20)   12/14/15  05:20    


 


APTT  36.2 SECONDS (23.1-32.0)  H  12/14/15  05:20    














- Constitutional


Appears: Well, Non-toxic, No Acute Distress





- Head Exam


Head Exam: ATRAUMATIC, NORMAL INSPECTION, NORMOCEPHALIC





- Eye Exam


Eye Exam: EOMI, Normal appearance, PERRL


Pupil Exam: NORMAL ACCOMODATION, PERRL





- ENT Exam


ENT Exam: Mucous Membranes Moist, Normal Exam





- Neck Exam


Neck Exam: Full ROM, Normal Inspection.  absent: Lymphadenopathy





- Respiratory Exam


Respiratory Exam: Clear to Ausculation Bilateral, NORMAL BREATHING PATTERN





- Cardiovascular Exam


Cardiovascular Exam: REGULAR RHYTHM, RRR, +S1, +S2.  absent: Murmur





- GI/Abdominal Exam


GI & Abdominal Exam: Soft, Normal Bowel Sounds.  absent: Tenderness





- Extremities Exam


Extremities Exam: Full ROM, Normal Capillary Refill, Normal Inspection.  absent

: Joint Swelling, Pedal Edema





- Back Exam


Back Exam: NORMAL INSPECTION





- Neurological Exam


Neurological Exam: Alert, Awake, CN II-XII Intact, Normal Gait, Oriented x3





- Psychiatric Exam


Psychiatric exam: Normal Affect, Normal Mood





- Skin


Skin Exam: Dry, Intact, Normal Color, Warm





Assessment and Plan


(1) DVT prophylaxis


Status: Chronic





(2) CAD (coronary artery disease)


Status: Chronic





(3) Smoking


Status: Chronic





(4) Low back pain


Status: Chronic





(5) Scrotal edema


Status: Chronic





(6) Prediabetes


Status: Chronic





(7) Neurocognitive disorder


Status: Chronic





(8) Leg edema, right


Status: Acute








- Assessment and Plan (Free Text)


Assessment: 


(1) DVT prophylaxis


Assessment & Plan: 


scd and ae hose


ambulation


Status: Chronic





(2) CAD (coronary artery disease)


Assessment & Plan: 


cont meds


Status: Chronic





(3) Smoking


Assessment & Plan: 


nicoderm


Status: Chronic





(4) Low back pain


Assessment & Plan: 


ibuprofen prn


Status: Chronic





(5) Scrotal edema


Status: Chronic





(6) Prediabetes


Assessment & Plan: 


dietary


Status: Chronic





(7) Neurocognitive disorder


Assessment & Plan: 


cont med


spendign placement


Status: Chronic

## 2016-11-22 RX ADMIN — DIVALPROEX SODIUM SCH MG: 500 TABLET, DELAYED RELEASE ORAL at 17:13

## 2016-11-22 RX ADMIN — DIVALPROEX SODIUM SCH MG: 500 TABLET, DELAYED RELEASE ORAL at 08:34

## 2016-11-22 NOTE — CP.PCM.PN
Subjective





- Date & Time of Evaluation


Date of Evaluation: 11/22/16


Time of Evaluation: 07:37





- Subjective


Subjective: 


no complaints/distress


no f/c, n/v/d


pending placement





Objective





- Vital Signs/Intake and Output


Vital Signs (last 24 hours): 


 











Temp Pulse Resp BP Pulse Ox


 


 97.7 F   66   20   117/69   98 


 


 11/21/16 20:43  11/21/16 21:01  11/21/16 20:43  11/21/16 21:01  11/21/16 20:43











- Medications


Medications: 


 Current Medications





Aspirin (Ecotrin)  81 mg PO DAILY [facility]


   Last Admin: 11/21/16 09:20 Dose:  81 mg


Divalproex Sodium (Depakote Dr(*Bid*))  500 mg PO BID [facility]


   Last Admin: 11/21/16 17:36 Dose:  500 mg


Enalapril Maleate (Vasotec)  10 mg PO DAILY [facility]


   Last Admin: 11/21/16 09:21 Dose:  10 mg


Famotidine (Pepcid)  20 mg PO BID [facility]


   Last Admin: 11/21/16 17:36 Dose:  20 mg


Ibuprofen (Motrin Tab)  600 mg PO Q8 PRN


   PRN Reason: Pain, moderate (4-7)


   Last Admin: 10/31/16 08:42 Dose:  600 mg


Metoprolol Tartrate (Lopressor)  50 mg PO Q12 [facility]


   Last Admin: 11/21/16 21:01 Dose:  50 mg


Quetiapine Fumarate (Seroquel)  25 mg PO HS [facility]


   Last Admin: 11/21/16 21:45 Dose:  25 mg











- Labs


Labs: 


 





 08/02/16 08:10 





 08/02/16 08:10 





 











PT  11.2 SECONDS (9.4-12.0)   12/14/15  05:20    


 


INR  1.05  (0.89-1.20)   12/14/15  05:20    


 


APTT  36.2 SECONDS (23.1-32.0)  H  12/14/15  05:20    














- Constitutional


Appears: Well, Non-toxic, No Acute Distress





- Head Exam


Head Exam: ATRAUMATIC, NORMAL INSPECTION, NORMOCEPHALIC





- Eye Exam


Eye Exam: EOMI, Normal appearance, PERRL


Pupil Exam: NORMAL ACCOMODATION, PERRL





- ENT Exam


ENT Exam: Mucous Membranes Moist, Normal Exam





- Neck Exam


Neck Exam: Full ROM, Normal Inspection.  absent: Lymphadenopathy





- Respiratory Exam


Respiratory Exam: Clear to Ausculation Bilateral, NORMAL BREATHING PATTERN





- Cardiovascular Exam


Cardiovascular Exam: REGULAR RHYTHM, +S1, +S2.  absent: Murmur





- GI/Abdominal Exam


GI & Abdominal Exam: Soft, Normal Bowel Sounds.  absent: Tenderness





- Extremities Exam


Extremities Exam: Full ROM, Normal Capillary Refill, Normal Inspection.  absent

: Joint Swelling, Pedal Edema





- Back Exam


Back Exam: NORMAL INSPECTION





- Neurological Exam


Neurological Exam: Alert, Awake, CN II-XII Intact, Normal Gait, Oriented x3





- Psychiatric Exam


Psychiatric exam: Normal Affect, Normal Mood





- Skin


Skin Exam: Dry, Intact, Normal Color, Warm





Assessment and Plan


(1) DVT prophylaxis


Status: Chronic





(2) CAD (coronary artery disease)


Status: Chronic





(3) Smoking


Status: Chronic





(4) Low back pain


Status: Chronic





(5) Scrotal edema


Status: Chronic





(6) Prediabetes


Status: Chronic





(7) Neurocognitive disorder


Status: Chronic





(8) Leg edema, right


Status: Acute








- Assessment and Plan (Free Text)


Assessment: 


(1) DVT prophylaxis


Assessment & Plan: 


scd and ae hose


ambulation


Status: Chronic





(2) CAD (coronary artery disease)


Assessment & Plan: 


cont meds


Status: Chronic





(3) Smoking


Assessment & Plan: 


nicoderm


Status: Chronic





(4) Low back pain


Assessment & Plan: 


ibuprofen prn


Status: Chronic





(5) Scrotal edema


Status: Chronic





(6) Prediabetes


Assessment & Plan: 


dietary


Status: Chronic





(7) Neurocognitive disorder


Assessment & Plan: 


cont med


spendign placement


Status: Chronic

## 2016-11-23 RX ADMIN — DIVALPROEX SODIUM SCH MG: 500 TABLET, DELAYED RELEASE ORAL at 18:21

## 2016-11-23 RX ADMIN — DIVALPROEX SODIUM SCH MG: 500 TABLET, DELAYED RELEASE ORAL at 09:09

## 2016-11-23 NOTE — CP.PCM.PN
Subjective





- Date & Time of Evaluation


Date of Evaluation: 11/23/16


Time of Evaluation: 07:19





- Subjective


Subjective: 


no compliants distress


no f/c, n/v/d


pending placement





Objective





- Vital Signs/Intake and Output


Vital Signs (last 24 hours): 


 











Temp Pulse Resp BP Pulse Ox


 


 98.4 F   71   18   131/88   100 


 


 11/22/16 20:25  11/22/16 21:33  11/22/16 20:25  11/22/16 21:33  11/22/16 20:25











- Medications


Medications: 


 Current Medications





Aspirin (Ecotrin)  81 mg PO DAILY [facility]


   Last Admin: 11/22/16 08:38 Dose:  81 mg


Divalproex Sodium (Depakote Dr(*Bid*))  500 mg PO BID [facility]


   Last Admin: 11/22/16 17:13 Dose:  500 mg


Enalapril Maleate (Vasotec)  10 mg PO DAILY [facility]


   Last Admin: 11/22/16 08:35 Dose:  10 mg


Famotidine (Pepcid)  20 mg PO BID [facility]


   Last Admin: 11/22/16 17:13 Dose:  20 mg


Ibuprofen (Motrin Tab)  600 mg PO Q8 PRN


   PRN Reason: Pain, moderate (4-7)


   Last Admin: 10/31/16 08:42 Dose:  600 mg


Metoprolol Tartrate (Lopressor)  50 mg PO Q12 [facility]


   Last Admin: 11/22/16 21:33 Dose:  50 mg


Quetiapine Fumarate (Seroquel)  25 mg PO HS [facility]


   Last Admin: 11/22/16 21:32 Dose:  25 mg











- Labs


Labs: 


 





 08/02/16 08:10 





 08/02/16 08:10 





 











PT  11.2 SECONDS (9.4-12.0)   12/14/15  05:20    


 


INR  1.05  (0.89-1.20)   12/14/15  05:20    


 


APTT  36.2 SECONDS (23.1-32.0)  H  12/14/15  05:20    














- Constitutional


Appears: Well, Non-toxic, No Acute Distress





- Head Exam


Head Exam: ATRAUMATIC, NORMAL INSPECTION, NORMOCEPHALIC





- Eye Exam


Eye Exam: EOMI, Normal appearance, PERRL


Pupil Exam: NORMAL ACCOMODATION, PERRL





- ENT Exam


ENT Exam: Mucous Membranes Moist, Normal Exam





- Neck Exam


Neck Exam: Full ROM, Normal Inspection.  absent: Lymphadenopathy





- Respiratory Exam


Respiratory Exam: Clear to Ausculation Bilateral, NORMAL BREATHING PATTERN





- Cardiovascular Exam


Cardiovascular Exam: REGULAR RHYTHM, RRR, +S1, +S2.  absent: Murmur





- GI/Abdominal Exam


GI & Abdominal Exam: Soft, Normal Bowel Sounds.  absent: Tenderness





- Extremities Exam


Extremities Exam: Full ROM, Normal Capillary Refill, Normal Inspection.  absent

: Joint Swelling, Pedal Edema





- Back Exam


Back Exam: NORMAL INSPECTION





- Neurological Exam


Neurological Exam: Alert, Awake, CN II-XII Intact, Normal Gait, Oriented x3





- Psychiatric Exam


Psychiatric exam: Normal Affect, Normal Mood





- Skin


Skin Exam: Dry, Intact, Normal Color, Warm





Assessment and Plan


(1) DVT prophylaxis


Status: Chronic





(2) CAD (coronary artery disease)


Status: Chronic





(3) Smoking


Status: Chronic





(4) Low back pain


Status: Chronic





(5) Scrotal edema


Status: Chronic





(6) Prediabetes


Status: Chronic





(7) Neurocognitive disorder


Status: Chronic





(8) Leg edema, right


Status: Acute








- Assessment and Plan (Free Text)


Assessment: 


(1) DVT prophylaxis


Assessment & Plan: 


scd and ae hose


ambulation


Status: Chronic





(2) CAD (coronary artery disease)


Assessment & Plan: 


cont meds


Status: Chronic





(3) Smoking


Assessment & Plan: 


nicoderm


Status: Chronic





(4) Low back pain


Assessment & Plan: 


ibuprofen prn


Status: Chronic





(5) Scrotal edema


Status: Chronic





(6) Prediabetes


Assessment & Plan: 


dietary


Status: Chronic





(7) Neurocognitive disorder


Assessment & Plan: 


cont med


spendign placement


Status: Chronic

## 2016-11-24 RX ADMIN — DIVALPROEX SODIUM SCH MG: 500 TABLET, DELAYED RELEASE ORAL at 09:14

## 2016-11-24 RX ADMIN — DIVALPROEX SODIUM SCH MG: 500 TABLET, DELAYED RELEASE ORAL at 16:10

## 2016-11-25 RX ADMIN — DIVALPROEX SODIUM SCH MG: 500 TABLET, DELAYED RELEASE ORAL at 16:38

## 2016-11-25 RX ADMIN — DIVALPROEX SODIUM SCH MG: 500 TABLET, DELAYED RELEASE ORAL at 09:48

## 2016-11-26 RX ADMIN — DIVALPROEX SODIUM SCH MG: 500 TABLET, DELAYED RELEASE ORAL at 09:00

## 2016-11-26 RX ADMIN — DIVALPROEX SODIUM SCH MG: 500 TABLET, DELAYED RELEASE ORAL at 16:51

## 2016-11-26 NOTE — CP.PCM.PN
Subjective





- Date & Time of Evaluation


Date of Evaluation: 11/26/16


Time of Evaluation: 08:12





- Subjective


Subjective: 


no comlaints/distress


no f/c, n/v/d


pending placement





Objective





- Vital Signs/Intake and Output


Vital Signs (last 24 hours): 


 











Temp Pulse Resp BP Pulse Ox


 


 97.9 F   79   18   110/74   98 


 


 11/26/16 07:29  11/26/16 07:29  11/26/16 07:29  11/26/16 07:29  11/26/16 07:29











- Medications


Medications: 


 Current Medications





Aspirin (Ecotrin)  81 mg PO DAILY [facility]


   Last Admin: 11/25/16 09:49 Dose:  81 mg


Divalproex Sodium (Depakote Dr(*Bid*))  500 mg PO BID [facility]


   Last Admin: 11/25/16 16:38 Dose:  500 mg


Enalapril Maleate (Vasotec)  10 mg PO DAILY [facility]


   Last Admin: 11/25/16 09:49 Dose:  10 mg


Famotidine (Pepcid)  20 mg PO BID [facility]


   Last Admin: 11/25/16 16:39 Dose:  20 mg


Ibuprofen (Motrin Tab)  600 mg PO Q8 PRN


   PRN Reason: Pain, moderate (4-7)


   Last Admin: 10/31/16 08:42 Dose:  600 mg


Metoprolol Tartrate (Lopressor)  50 mg PO Q12 [facility]


   Last Admin: 11/25/16 21:11 Dose:  50 mg


Quetiapine Fumarate (Seroquel)  25 mg PO HS [facility]


   Last Admin: 11/25/16 21:11 Dose:  25 mg











- Labs


Labs: 


 





 08/02/16 08:10 





 08/02/16 08:10 





 











PT  11.2 SECONDS (9.4-12.0)   12/14/15  05:20    


 


INR  1.05  (0.89-1.20)   12/14/15  05:20    


 


APTT  36.2 SECONDS (23.1-32.0)  H  12/14/15  05:20    














- Constitutional


Appears: Well, Non-toxic, No Acute Distress





- Head Exam


Head Exam: ATRAUMATIC, NORMAL INSPECTION, NORMOCEPHALIC





- Eye Exam


Eye Exam: EOMI, Normal appearance, PERRL


Pupil Exam: NORMAL ACCOMODATION, PERRL





- ENT Exam


ENT Exam: Mucous Membranes Moist, Normal Exam





- Neck Exam


Neck Exam: Full ROM, Normal Inspection.  absent: Lymphadenopathy





- Respiratory Exam


Respiratory Exam: Clear to Ausculation Bilateral, NORMAL BREATHING PATTERN





- Cardiovascular Exam


Cardiovascular Exam: REGULAR RHYTHM, RRR, +S1, +S2.  absent: Murmur





- GI/Abdominal Exam


GI & Abdominal Exam: Soft, Normal Bowel Sounds.  absent: Tenderness





- Extremities Exam


Extremities Exam: Full ROM, Normal Capillary Refill, Normal Inspection.  absent

: Joint Swelling, Pedal Edema





- Back Exam


Back Exam: NORMAL INSPECTION





- Neurological Exam


Neurological Exam: Alert, Awake, CN II-XII Intact, Normal Gait, Oriented x3





- Psychiatric Exam


Psychiatric exam: Normal Affect, Normal Mood





- Skin


Skin Exam: Dry, Intact, Normal Color, Warm





Assessment and Plan


(1) DVT prophylaxis


Status: Chronic





(2) CAD (coronary artery disease)


Status: Chronic





(3) Smoking


Status: Chronic





(4) Low back pain


Status: Chronic





(5) Scrotal edema


Status: Chronic





(6) Prediabetes


Status: Chronic





(7) Neurocognitive disorder


Status: Chronic





(8) Leg edema, right


Status: Acute








- Assessment and Plan (Free Text)


Assessment: 


(1) DVT prophylaxis


Assessment & Plan: 


scd and ae hose


ambulation


Status: Chronic





(2) CAD (coronary artery disease)


Assessment & Plan: 


cont meds


Status: Chronic





(3) Smoking


Assessment & Plan: 


nicoderm


Status: Chronic





(4) Low back pain


Assessment & Plan: 


ibuprofen prn


Status: Chronic





(5) Scrotal edema


Status: Chronic





(6) Prediabetes


Assessment & Plan: 


dietary


Status: Chronic





(7) Neurocognitive disorder


Assessment & Plan: 


cont med


spendign placement


Status: Chronic

## 2016-11-26 NOTE — CP.PCM.PN
Subjective





- Date & Time of Evaluation


Date of Evaluation: 11/25/16


Time of Evaluation: 14:00





- Subjective


Subjective: 


no complaints/distress


no f/c, n/v/d


pending placement





Objective





- Vital Signs/Intake and Output


Vital Signs (last 24 hours): 


 











Temp Pulse Resp BP Pulse Ox


 


 97.9 F   79   18   110/74   98 


 


 11/26/16 07:29  11/26/16 07:29  11/26/16 07:29  11/26/16 07:29  11/26/16 07:29











- Medications


Medications: 


 Current Medications





Aspirin (Ecotrin)  81 mg PO DAILY [facility]


   Last Admin: 11/25/16 09:49 Dose:  81 mg


Divalproex Sodium (Depakote Dr(*Bid*))  500 mg PO BID [facility]


   Last Admin: 11/25/16 16:38 Dose:  500 mg


Enalapril Maleate (Vasotec)  10 mg PO DAILY [facility]


   Last Admin: 11/25/16 09:49 Dose:  10 mg


Famotidine (Pepcid)  20 mg PO BID [facility]


   Last Admin: 11/25/16 16:39 Dose:  20 mg


Ibuprofen (Motrin Tab)  600 mg PO Q8 PRN


   PRN Reason: Pain, moderate (4-7)


   Last Admin: 10/31/16 08:42 Dose:  600 mg


Metoprolol Tartrate (Lopressor)  50 mg PO Q12 [facility]


   Last Admin: 11/25/16 21:11 Dose:  50 mg


Quetiapine Fumarate (Seroquel)  25 mg PO HS [facility]


   Last Admin: 11/25/16 21:11 Dose:  25 mg











- Labs


Labs: 


 





 08/02/16 08:10 





 08/02/16 08:10 





 











PT  11.2 SECONDS (9.4-12.0)   12/14/15  05:20    


 


INR  1.05  (0.89-1.20)   12/14/15  05:20    


 


APTT  36.2 SECONDS (23.1-32.0)  H  12/14/15  05:20    














- Constitutional


Appears: Well, Non-toxic, No Acute Distress





- Head Exam


Head Exam: ATRAUMATIC, NORMAL INSPECTION, NORMOCEPHALIC





- Eye Exam


Eye Exam: EOMI, Normal appearance, PERRL


Pupil Exam: NORMAL ACCOMODATION, PERRL





- ENT Exam


ENT Exam: Mucous Membranes Moist, Normal Exam





- Neck Exam


Neck Exam: Full ROM, Normal Inspection.  absent: Lymphadenopathy





- Respiratory Exam


Respiratory Exam: Clear to Ausculation Bilateral, NORMAL BREATHING PATTERN





- Cardiovascular Exam


Cardiovascular Exam: REGULAR RHYTHM, RRR, +S1, +S2.  absent: Murmur





- GI/Abdominal Exam


GI & Abdominal Exam: Soft, Normal Bowel Sounds.  absent: Tenderness





- Extremities Exam


Extremities Exam: Full ROM, Normal Capillary Refill, Normal Inspection.  absent

: Joint Swelling, Pedal Edema





- Back Exam


Back Exam: NORMAL INSPECTION





- Neurological Exam


Neurological Exam: Alert, Awake, CN II-XII Intact, Normal Gait, Oriented x3





- Psychiatric Exam


Psychiatric exam: Normal Affect, Normal Mood





- Skin


Skin Exam: Dry, Intact, Normal Color, Warm





Assessment and Plan


(1) DVT prophylaxis


Status: Chronic





(2) CAD (coronary artery disease)


Status: Chronic





(3) Smoking


Status: Chronic





(4) Low back pain


Status: Chronic





(5) Scrotal edema


Status: Chronic





(6) Prediabetes


Status: Chronic





(7) Neurocognitive disorder


Status: Chronic





(8) Leg edema, right


Status: Acute








- Assessment and Plan (Free Text)


Assessment: 


(1) DVT prophylaxis


Assessment & Plan: 


scd and ae hose


ambulation


Status: Chronic





(2) CAD (coronary artery disease)


Assessment & Plan: 


cont meds


Status: Chronic





(3) Smoking


Assessment & Plan: 


nicoderm


Status: Chronic





(4) Low back pain


Assessment & Plan: 


ibuprofen prn


Status: Chronic





(5) Scrotal edema


Status: Chronic





(6) Prediabetes


Assessment & Plan: 


dietary


Status: Chronic





(7) Neurocognitive disorder


Assessment & Plan: 


cont med


spendign placement


Status: Chronic

## 2016-11-26 NOTE — CP.PCM.PN
Subjective





- Date & Time of Evaluation


Date of Evaluation: 11/24/16


Time of Evaluation: 06:00





- Subjective


Subjective: 


no complaints/idstres


no f/c, n/v/d


pending placement





Objective





- Vital Signs/Intake and Output


Vital Signs (last 24 hours): 


 











Temp Pulse Resp BP Pulse Ox


 


 97.9 F   79   18   110/74   98 


 


 11/26/16 07:29  11/26/16 07:29  11/26/16 07:29  11/26/16 07:29  11/26/16 07:29











- Medications


Medications: 


 Current Medications





Aspirin (Ecotrin)  81 mg PO DAILY [facility]


   Last Admin: 11/25/16 09:49 Dose:  81 mg


Divalproex Sodium (Depakote Dr(*Bid*))  500 mg PO BID [facility]


   Last Admin: 11/25/16 16:38 Dose:  500 mg


Enalapril Maleate (Vasotec)  10 mg PO DAILY [facility]


   Last Admin: 11/25/16 09:49 Dose:  10 mg


Famotidine (Pepcid)  20 mg PO BID [facility]


   Last Admin: 11/25/16 16:39 Dose:  20 mg


Ibuprofen (Motrin Tab)  600 mg PO Q8 PRN


   PRN Reason: Pain, moderate (4-7)


   Last Admin: 10/31/16 08:42 Dose:  600 mg


Metoprolol Tartrate (Lopressor)  50 mg PO Q12 [facility]


   Last Admin: 11/25/16 21:11 Dose:  50 mg


Quetiapine Fumarate (Seroquel)  25 mg PO HS [facility]


   Last Admin: 11/25/16 21:11 Dose:  25 mg











- Labs


Labs: 


 





 08/02/16 08:10 





 08/02/16 08:10 





 











PT  11.2 SECONDS (9.4-12.0)   12/14/15  05:20    


 


INR  1.05  (0.89-1.20)   12/14/15  05:20    


 


APTT  36.2 SECONDS (23.1-32.0)  H  12/14/15  05:20    














- Constitutional


Appears: Well, Non-toxic, No Acute Distress





- Head Exam


Head Exam: ATRAUMATIC, NORMAL INSPECTION, NORMOCEPHALIC





- Eye Exam


Eye Exam: EOMI, Normal appearance, PERRL


Pupil Exam: NORMAL ACCOMODATION, PERRL





- ENT Exam


ENT Exam: Mucous Membranes Moist, Normal Exam





- Neck Exam


Neck Exam: Full ROM, Normal Inspection.  absent: Lymphadenopathy





- Respiratory Exam


Respiratory Exam: Clear to Ausculation Bilateral, NORMAL BREATHING PATTERN





- Cardiovascular Exam


Cardiovascular Exam: REGULAR RHYTHM, RRR, +S1, +S2.  absent: Murmur





- GI/Abdominal Exam


GI & Abdominal Exam: Soft, Normal Bowel Sounds.  absent: Tenderness





- Extremities Exam


Extremities Exam: Full ROM, Normal Capillary Refill, Normal Inspection.  absent

: Joint Swelling, Pedal Edema





- Back Exam


Back Exam: NORMAL INSPECTION





- Neurological Exam


Neurological Exam: Alert, Awake, CN II-XII Intact, Normal Gait, Oriented x3





- Psychiatric Exam


Psychiatric exam: Normal Affect, Normal Mood





- Skin


Skin Exam: Dry, Intact, Normal Color, Warm





Assessment and Plan


(1) DVT prophylaxis


Status: Chronic





(2) CAD (coronary artery disease)


Status: Chronic





(3) Smoking


Status: Chronic





(4) Low back pain


Status: Chronic





(5) Scrotal edema


Status: Chronic





(6) Prediabetes


Status: Chronic





(7) Neurocognitive disorder


Status: Chronic





(8) Leg edema, right


Status: Acute








- Assessment and Plan (Free Text)


Assessment: 


(1) DVT prophylaxis


Assessment & Plan: 


scd and ae hose


ambulation


Status: Chronic





(2) CAD (coronary artery disease)


Assessment & Plan: 


cont meds


Status: Chronic





(3) Smoking


Assessment & Plan: 


nicoderm


Status: Chronic





(4) Low back pain


Assessment & Plan: 


ibuprofen prn


Status: Chronic





(5) Scrotal edema


Status: Chronic





(6) Prediabetes


Assessment & Plan: 


dietary


Status: Chronic





(7) Neurocognitive disorder


Assessment & Plan: 


cont med


spendign placement


Status: Chronic

## 2016-11-27 RX ADMIN — DIVALPROEX SODIUM SCH MG: 500 TABLET, DELAYED RELEASE ORAL at 09:06

## 2016-11-27 RX ADMIN — DIVALPROEX SODIUM SCH MG: 500 TABLET, DELAYED RELEASE ORAL at 17:17

## 2016-11-27 NOTE — CP.PCM.PN
Subjective





- Date & Time of Evaluation


Date of Evaluation: 11/27/16


Time of Evaluation: 13:20





- Subjective


Subjective: 


no f/c, n/v/d


no complaints/distress


pending placement





Objective





- Vital Signs/Intake and Output


Vital Signs (last 24 hours): 


 











Temp Pulse Resp BP Pulse Ox


 


 97.8 F   69   20   99/65 L  95 


 


 11/27/16 07:33  11/27/16 09:05  11/27/16 07:33  11/27/16 09:05  11/27/16 07:33











- Medications


Medications: 


 Current Medications





Aspirin (Ecotrin)  81 mg PO DAILY [facility]


   Last Admin: 11/27/16 09:06 Dose:  81 mg


Divalproex Sodium (Depakote Dr(*Bid*))  500 mg PO BID [facility]


   Last Admin: 11/27/16 09:06 Dose:  500 mg


Enalapril Maleate (Vasotec)  10 mg PO DAILY [facility]


   Last Admin: 11/27/16 09:06 Dose:  10 mg


Famotidine (Pepcid)  20 mg PO BID [facility]


   Last Admin: 11/27/16 09:05 Dose:  20 mg


Ibuprofen (Motrin Tab)  600 mg PO Q8 PRN


   PRN Reason: Pain, moderate (4-7)


   Last Admin: 10/31/16 08:42 Dose:  600 mg


Metoprolol Tartrate (Lopressor)  50 mg PO Q12 [facility]


   Last Admin: 11/27/16 09:05 Dose:  50 mg


Quetiapine Fumarate (Seroquel)  25 mg PO HS [facility]


   Last Admin: 11/26/16 21:59 Dose:  25 mg











- Labs


Labs: 


 





 08/02/16 08:10 





 08/02/16 08:10 





 











PT  11.2 SECONDS (9.4-12.0)   12/14/15  05:20    


 


INR  1.05  (0.89-1.20)   12/14/15  05:20    


 


APTT  36.2 SECONDS (23.1-32.0)  H  12/14/15  05:20    














- Constitutional


Appears: Well, Non-toxic, No Acute Distress





- Head Exam


Head Exam: ATRAUMATIC, NORMAL INSPECTION, NORMOCEPHALIC





- Eye Exam


Eye Exam: EOMI, Normal appearance, PERRL


Pupil Exam: NORMAL ACCOMODATION, PERRL





- ENT Exam


ENT Exam: Mucous Membranes Moist, Normal Exam





- Neck Exam


Neck Exam: Full ROM, Normal Inspection.  absent: Lymphadenopathy





- Respiratory Exam


Respiratory Exam: Clear to Ausculation Bilateral, NORMAL BREATHING PATTERN





- Cardiovascular Exam


Cardiovascular Exam: REGULAR RHYTHM, +S1, +S2.  absent: Murmur





- GI/Abdominal Exam


GI & Abdominal Exam: Soft, Normal Bowel Sounds.  absent: Tenderness





- Extremities Exam


Extremities Exam: Full ROM, Normal Capillary Refill, Normal Inspection.  absent

: Joint Swelling, Pedal Edema





- Back Exam


Back Exam: NORMAL INSPECTION





- Neurological Exam


Neurological Exam: Alert, Awake, CN II-XII Intact, Normal Gait, Oriented x3





- Psychiatric Exam


Psychiatric exam: Normal Affect, Normal Mood





- Skin


Skin Exam: Dry, Intact, Normal Color, Warm





Assessment and Plan


(1) DVT prophylaxis


Status: Chronic





(2) CAD (coronary artery disease)


Status: Chronic





(3) Smoking


Status: Chronic





(4) Low back pain


Status: Chronic





(5) Scrotal edema


Status: Chronic





(6) Prediabetes


Status: Chronic





(7) Neurocognitive disorder


Status: Chronic





(8) Leg edema, right


Status: Acute








- Assessment and Plan (Free Text)


Assessment: 


(1) DVT prophylaxis


Assessment & Plan: 


scd and ae hose


ambulation


Status: Chronic





(2) CAD (coronary artery disease)


Assessment & Plan: 


cont meds


Status: Chronic





(3) Smoking


Assessment & Plan: 


nicoderm


Status: Chronic





(4) Low back pain


Assessment & Plan: 


ibuprofen prn


Status: Chronic





(5) Scrotal edema


Status: Chronic





(6) Prediabetes


Assessment & Plan: 


dietary


Status: Chronic





(7) Neurocognitive disorder


Assessment & Plan: 


cont med


spendign placement


Status: Chronic

## 2016-11-28 RX ADMIN — DIVALPROEX SODIUM SCH MG: 500 TABLET, DELAYED RELEASE ORAL at 16:18

## 2016-11-28 RX ADMIN — DIVALPROEX SODIUM SCH MG: 500 TABLET, DELAYED RELEASE ORAL at 08:23

## 2016-11-28 NOTE — CP.[email or web address].PN
Subjective





- Date & Time of Evaluation


Date of Evaluation: 11/28/16


Time of Evaluation: 07:21





- Subjective


Subjective: 


no f/c, n/v/d


pending placement


no complaints/distress





Objective





- Vital Signs/Intake and Output


Vital Signs (last 24 hours): 


 











Temp Pulse Resp BP Pulse Ox


 


 97.5 F L  75   19   90/60 L  97 


 


 11/27/16 23:49  11/27/16 23:49  11/27/16 23:49  11/27/16 23:49  11/27/16 23:49











- Medications


Medications: 


 Current Medications





Aspirin (Ecotrin)  81 mg PO DAILY [facility]


   Last Admin: 11/27/16 09:06 Dose:  81 mg


Divalproex Sodium (Depakote Dr(*Bid*))  500 mg PO BID [facility]


   Last Admin: 11/27/16 17:17 Dose:  500 mg


Enalapril Maleate (Vasotec)  10 mg PO DAILY [facility]


   Last Admin: 11/27/16 09:06 Dose:  10 mg


Famotidine (Pepcid)  20 mg PO BID [facility]


   Last Admin: 11/27/16 17:17 Dose:  20 mg


Ibuprofen (Motrin Tab)  600 mg PO Q8 PRN


   PRN Reason: Pain, moderate (4-7)


   Last Admin: 10/31/16 08:42 Dose:  600 mg


Metoprolol Tartrate (Lopressor)  50 mg PO Q12 [facility]


   Last Admin: 11/27/16 21:13 Dose:  50 mg


Quetiapine Fumarate (Seroquel)  25 mg PO HS [facility]


   Last Admin: 11/27/16 21:13 Dose:  25 mg











- Labs


Labs: 


 





 08/02/16 08:10 





 08/02/16 08:10 





 











PT  11.2 SECONDS (9.4-12.0)   12/14/15  05:20    


 


INR  1.05  (0.89-1.20)   12/14/15  05:20    


 


APTT  36.2 SECONDS (23.1-32.0)  H  12/14/15  05:20    














- Constitutional


Appears: Well, Non-toxic, No Acute Distress





- Head Exam


Head Exam: ATRAUMATIC, NORMAL INSPECTION, NORMOCEPHALIC





- Eye Exam


Eye Exam: EOMI, Normal appearance, PERRL


Pupil Exam: NORMAL ACCOMODATION, PERRL





- ENT Exam


ENT Exam: Mucous Membranes Moist, Normal Exam





- Neck Exam


Neck Exam: Full ROM, Normal Inspection.  absent: Lymphadenopathy





- Respiratory Exam


Respiratory Exam: Clear to Ausculation Bilateral, NORMAL BREATHING PATTERN





- Cardiovascular Exam


Cardiovascular Exam: REGULAR RHYTHM, +S1, +S2.  absent: Murmur





- GI/Abdominal Exam


GI & Abdominal Exam: Soft, Normal Bowel Sounds.  absent: Tenderness





- Extremities Exam


Extremities Exam: Full ROM, Normal Capillary Refill, Normal Inspection.  absent

: Joint Swelling, Pedal Edema





- Back Exam


Back Exam: NORMAL INSPECTION





- Neurological Exam


Neurological Exam: Alert, Awake, CN II-XII Intact, Normal Gait, Oriented x3





- Psychiatric Exam


Psychiatric exam: Normal Affect, Normal Mood





- Skin


Skin Exam: Dry, Intact, Normal Color, Warm





Assessment and Plan


(1) DVT prophylaxis


Status: Chronic





(2) CAD (coronary artery disease)


Status: Chronic





(3) Smoking


Status: Chronic





(4) Low back pain


Status: Chronic





(5) Scrotal edema


Status: Chronic





(6) Prediabetes


Status: Chronic





(7) Neurocognitive disorder


Status: Chronic





(8) Leg edema, right


Status: Acute








- Assessment and Plan (Free Text)


Assessment: 


(1) DVT prophylaxis


Assessment & Plan: 


scd and ae hose


ambulation


Status: Chronic





(2) CAD (coronary artery disease)


Assessment & Plan: 


cont meds


Status: Chronic





(3) Smoking


Assessment & Plan: 


nicoderm


Status: Chronic





(4) Low back pain


Assessment & Plan: 


ibuprofen prn


Status: Chronic





(5) Scrotal edema


Status: Chronic





(6) Prediabetes


Assessment & Plan: 


dietary


Status: Chronic





(7) Neurocognitive disorder


Assessment & Plan: 


cont med


spendign placement


Status: Chronic

## 2016-11-29 LAB
ALBUMIN SERPL-MCNC: 3.9 G/DL (ref 3.5–5)
ALBUMIN/GLOB SERPL: 1.1 {RATIO} (ref 1–2.1)
ALT SERPL-CCNC: 26 U/L (ref 21–72)
AST SERPL-CCNC: 26 U/L (ref 17–59)
BASOPHILS # BLD AUTO: 0 K/UL (ref 0–0.2)
BASOPHILS NFR BLD: 0.3 % (ref 0–2)
BUN SERPL-MCNC: 21 MG/DL (ref 9–20)
CALCIUM SERPL-MCNC: 9.1 MG/DL (ref 8.4–10.2)
EOSINOPHIL # BLD AUTO: 0.6 K/UL (ref 0–0.7)
EOSINOPHIL NFR BLD: 7.4 % (ref 0–4)
ERYTHROCYTE [DISTWIDTH] IN BLOOD BY AUTOMATED COUNT: 13.5 % (ref 11.5–14.5)
GFR NON-AFRICAN AMERICAN: > 60
HDLC SERPL-MCNC: 26 MG/DL (ref 30–70)
HGB BLD-MCNC: 12.3 G/DL (ref 12–18)
LDLC SERPL-MCNC: 135 MG/DL (ref 0–129)
LYMPHOCYTES # BLD AUTO: 3 K/UL (ref 1–4.3)
LYMPHOCYTES NFR BLD AUTO: 39.7 % (ref 20–40)
MCH RBC QN AUTO: 30.4 PG (ref 27–31)
MCHC RBC AUTO-ENTMCNC: 33.2 G/DL (ref 33–37)
MCV RBC AUTO: 91.5 FL (ref 80–94)
MONOCYTES # BLD: 0.9 K/UL (ref 0–0.8)
MONOCYTES NFR BLD: 11.4 % (ref 0–10)
NEUTROPHILS # BLD: 3.1 K/UL (ref 1.8–7)
NEUTROPHILS NFR BLD AUTO: 41.2 % (ref 50–75)
NRBC BLD AUTO-RTO: 0.1 % (ref 0–0)
PLATELET # BLD: 191 K/UL (ref 130–400)
PMV BLD AUTO: 7.4 FL (ref 7.2–11.7)
RBC # BLD AUTO: 4.03 MIL/UL (ref 4.4–5.9)
WBC # BLD AUTO: 7.6 K/UL (ref 4.8–10.8)

## 2016-11-29 RX ADMIN — DIVALPROEX SODIUM SCH MG: 500 TABLET, DELAYED RELEASE ORAL at 08:55

## 2016-11-29 RX ADMIN — DIVALPROEX SODIUM SCH MG: 500 TABLET, DELAYED RELEASE ORAL at 17:25

## 2016-11-29 NOTE — CP.[email or web address].PN
Subjective





- Date & Time of Evaluation


Date of Evaluation: 11/29/16


Time of Evaluation: 07:46





- Subjective


Subjective: 


no complaints/distress


no f/c, n/v/d


pending placement





Objective





- Vital Signs/Intake and Output


Vital Signs (last 24 hours): 


 











Temp Pulse Resp BP Pulse Ox


 


 98.1 F   78   20   115/80   97 


 


 11/28/16 22:08  11/28/16 22:08  11/28/16 22:08  11/28/16 22:08  11/28/16 22:08











- Medications


Medications: 


 Current Medications





Aspirin (Ecotrin)  81 mg PO DAILY [facility]


   Last Admin: 11/28/16 08:23 Dose:  81 mg


Divalproex Sodium (Depakote Dr(*Bid*))  500 mg PO BID [facility]


   Last Admin: 11/28/16 16:18 Dose:  500 mg


Enalapril Maleate (Vasotec)  10 mg PO DAILY [facility]


   Last Admin: 11/28/16 08:24 Dose:  10 mg


Famotidine (Pepcid)  20 mg PO BID [facility]


   Last Admin: 11/28/16 16:19 Dose:  20 mg


Ibuprofen (Motrin Tab)  600 mg PO Q8 PRN


   PRN Reason: Pain, moderate (4-7)


   Last Admin: 10/31/16 08:42 Dose:  600 mg


Metoprolol Tartrate (Lopressor)  50 mg PO Q12 [facility]


   Last Admin: 11/28/16 21:45 Dose:  50 mg


Quetiapine Fumarate (Seroquel)  25 mg PO HS [facility]


   Last Admin: 11/28/16 21:45 Dose:  25 mg











- Labs


Labs: 


 





 11/29/16 05:30 





 11/29/16 05:30 





 











PT  11.2 SECONDS (9.4-12.0)   12/14/15  05:20    


 


INR  1.05  (0.89-1.20)   12/14/15  05:20    


 


APTT  36.2 SECONDS (23.1-32.0)  H  12/14/15  05:20    














- Constitutional


Appears: Well, Non-toxic, No Acute Distress





- Head Exam


Head Exam: ATRAUMATIC, NORMAL INSPECTION, NORMOCEPHALIC





- Eye Exam


Eye Exam: EOMI, Normal appearance, PERRL


Pupil Exam: NORMAL ACCOMODATION, PERRL





- ENT Exam


ENT Exam: Mucous Membranes Moist, Normal Exam





- Neck Exam


Neck Exam: Full ROM, Normal Inspection.  absent: Lymphadenopathy





- Respiratory Exam


Respiratory Exam: Clear to Ausculation Bilateral, NORMAL BREATHING PATTERN





- Cardiovascular Exam


Cardiovascular Exam: REGULAR RHYTHM, RRR, +S1, +S2.  absent: Murmur





- GI/Abdominal Exam


GI & Abdominal Exam: Soft, Normal Bowel Sounds.  absent: Tenderness





- Extremities Exam


Extremities Exam: Full ROM, Normal Capillary Refill, Normal Inspection.  absent

: Joint Swelling, Pedal Edema





- Back Exam


Back Exam: NORMAL INSPECTION





- Neurological Exam


Neurological Exam: Alert, Awake, CN II-XII Intact, Normal Gait, Oriented x3





- Psychiatric Exam


Psychiatric exam: Normal Affect, Normal Mood





- Skin


Skin Exam: Dry, Intact, Normal Color, Warm





Assessment and Plan


(1) DVT prophylaxis


Status: Chronic





(2) CAD (coronary artery disease)


Status: Chronic





(3) Smoking


Status: Chronic





(4) Low back pain


Status: Chronic





(5) Scrotal edema


Status: Chronic





(6) Prediabetes


Status: Chronic





(7) Neurocognitive disorder


Status: Chronic





(8) Leg edema, right


Status: Acute








- Assessment and Plan (Free Text)


Assessment: 


(1) DVT prophylaxis


Assessment & Plan: 


scd and ae hose


ambulation


Status: Chronic





(2) CAD (coronary artery disease)


Assessment & Plan: 


cont meds


Status: Chronic





(3) Smoking


Assessment & Plan: 


nicoderm


Status: Chronic





(4) Low back pain


Assessment & Plan: 


ibuprofen prn


Status: Chronic





(5) Scrotal edema


Status: Chronic





(6) Prediabetes


Assessment & Plan: 


dietary


Status: Chronic





(7) Neurocognitive disorder


Assessment & Plan: 


cont med


spendign placement


Status: Chronic

## 2016-11-30 RX ADMIN — DIVALPROEX SODIUM SCH MG: 500 TABLET, DELAYED RELEASE ORAL at 08:38

## 2016-11-30 RX ADMIN — DIVALPROEX SODIUM SCH MG: 500 TABLET, DELAYED RELEASE ORAL at 16:20

## 2016-11-30 NOTE — CP.[email or web address].PN
Subjective





- Date & Time of Evaluation


Date of Evaluation: 11/30/16


Time of Evaluation: 07:27





- Subjective


Subjective: 


no complaintds/distress


pending placement


no f/c,n/v/d





Objective





- Vital Signs/Intake and Output


Vital Signs (last 24 hours): 


 











Temp Pulse Resp BP Pulse Ox


 


 98.2 F   81   20   113/63   95 


 


 11/29/16 22:22  11/29/16 22:22  11/29/16 22:22  11/29/16 22:22  11/29/16 22:22











- Medications


Medications: 


 Current Medications





Aspirin (Ecotrin)  81 mg PO DAILY [facility]


   Last Admin: 11/29/16 08:56 Dose:  81 mg


Atorvastatin Calcium (Lipitor)  20 mg PO HS [facility]


   Last Admin: 11/29/16 21:55 Dose:  20 mg


Divalproex Sodium (Depakote Dr(*Bid*))  500 mg PO BID [facility]


   Last Admin: 11/29/16 17:25 Dose:  500 mg


Enalapril Maleate (Vasotec)  10 mg PO DAILY [facility]


   Last Admin: 11/29/16 08:55 Dose:  10 mg


Famotidine (Pepcid)  20 mg PO BID [facility]


   Last Admin: 11/29/16 17:25 Dose:  20 mg


Ibuprofen (Motrin Tab)  600 mg PO Q8 PRN


   PRN Reason: Pain, moderate (4-7)


   Last Admin: 10/31/16 08:42 Dose:  600 mg


Metoprolol Tartrate (Lopressor)  50 mg PO Q12 [facility]


   Last Admin: 11/29/16 21:55 Dose:  50 mg


Quetiapine Fumarate (Seroquel)  25 mg PO HS [facility]


   Last Admin: 11/29/16 21:55 Dose:  25 mg











- Labs


Labs: 


 





 11/29/16 05:30 





 11/29/16 05:30 





 











PT  11.2 SECONDS (9.4-12.0)   12/14/15  05:20    


 


INR  1.05  (0.89-1.20)   12/14/15  05:20    


 


APTT  36.2 SECONDS (23.1-32.0)  H  12/14/15  05:20    














- Constitutional


Appears: Well, Non-toxic, No Acute Distress





- Head Exam


Head Exam: ATRAUMATIC, NORMAL INSPECTION, NORMOCEPHALIC





- Eye Exam


Eye Exam: EOMI, Normal appearance, PERRL


Pupil Exam: NORMAL ACCOMODATION, PERRL





- ENT Exam


ENT Exam: Mucous Membranes Moist, Normal Exam





- Neck Exam


Neck Exam: Full ROM, Normal Inspection.  absent: Lymphadenopathy





- Respiratory Exam


Respiratory Exam: Clear to Ausculation Bilateral, NORMAL BREATHING PATTERN





- Cardiovascular Exam


Cardiovascular Exam: REGULAR RHYTHM, RRR, +S1, +S2.  absent: Murmur





- GI/Abdominal Exam


GI & Abdominal Exam: Soft, Normal Bowel Sounds.  absent: Tenderness





- Extremities Exam


Extremities Exam: Full ROM, Normal Capillary Refill, Normal Inspection.  absent

: Joint Swelling, Pedal Edema





- Back Exam


Back Exam: NORMAL INSPECTION





- Neurological Exam


Neurological Exam: Alert, Awake, CN II-XII Intact, Normal Gait, Oriented x3





- Psychiatric Exam


Psychiatric exam: Normal Affect, Normal Mood





- Skin


Skin Exam: Dry, Intact, Normal Color, Warm





Assessment and Plan


(1) DVT prophylaxis


Status: Chronic





(2) CAD (coronary artery disease)


Status: Chronic





(3) Smoking


Status: Chronic





(4) Low back pain


Status: Chronic





(5) Scrotal edema


Status: Chronic





(6) Prediabetes


Status: Chronic





(7) Neurocognitive disorder


Status: Chronic





(8) Leg edema, right


Status: Acute








- Assessment and Plan (Free Text)


Assessment: 


(1) DVT prophylaxis


Assessment & Plan: 


scd and ae hose


ambulation


Status: Chronic





(2) CAD (coronary artery disease)


Assessment & Plan: 


cont meds


Status: Chronic





(3) Smoking


Assessment & Plan: 


nicoderm


Status: Chronic





(4) Low back pain


Assessment & Plan: 


ibuprofen prn


Status: Chronic





(5) Scrotal edema


Status: Chronic





(6) Prediabetes


Assessment & Plan: 


dietary


Status: Chronic





(7) Neurocognitive disorder


Assessment & Plan: 


cont med


spendign placement


Status: Chronic

## 2016-12-01 RX ADMIN — DIVALPROEX SODIUM SCH MG: 500 TABLET, DELAYED RELEASE ORAL at 08:28

## 2016-12-01 RX ADMIN — DIVALPROEX SODIUM SCH MG: 500 TABLET, DELAYED RELEASE ORAL at 16:05

## 2016-12-01 NOTE — CP.[email or web address].PN
Subjective





- Date & Time of Evaluation


Date of Evaluation: 12/01/16


Time of Evaluation: 06:54





- Subjective


Subjective: 


dictated note


pending placement





Objective





- Vital Signs/Intake and Output


Vital Signs (last 24 hours): 


 











Temp Pulse Resp BP Pulse Ox


 


 97.8 F   78   18   106/61   98 


 


 12/01/16 01:00  12/01/16 01:00  12/01/16 01:00  11/30/16 22:50  12/01/16 01:00











- Medications


Medications: 


 Current Medications





Aspirin (Ecotrin)  81 mg PO DAILY [facility]


   Last Admin: 11/30/16 08:38 Dose:  81 mg


Atorvastatin Calcium (Lipitor)  20 mg PO HS [facility]


   Last Admin: 11/30/16 21:26 Dose:  20 mg


Divalproex Sodium (Depakote Dr(*Bid*))  500 mg PO BID [facility]


   Last Admin: 11/30/16 16:20 Dose:  500 mg


Enalapril Maleate (Vasotec)  10 mg PO DAILY [facility]


   Last Admin: 11/30/16 08:40 Dose:  10 mg


Famotidine (Pepcid)  20 mg PO BID [facility]


   Last Admin: 11/30/16 16:20 Dose:  20 mg


Ibuprofen (Motrin Tab)  600 mg PO Q8 PRN


   PRN Reason: Pain, moderate (4-7)


   Last Admin: 10/31/16 08:42 Dose:  600 mg


Metoprolol Tartrate (Lopressor)  50 mg PO Q12 [facility]


   Last Admin: 11/30/16 21:26 Dose:  50 mg


Quetiapine Fumarate (Seroquel)  25 mg PO HS [facility]


   Last Admin: 11/30/16 21:26 Dose:  25 mg











- Labs


Labs: 


 





 11/29/16 05:30 





 11/29/16 05:30 





 











PT  11.2 SECONDS (9.4-12.0)   12/14/15  05:20    


 


INR  1.05  (0.89-1.20)   12/14/15  05:20    


 


APTT  36.2 SECONDS (23.1-32.0)  H  12/14/15  05:20    














Assessment and Plan


(1) DVT prophylaxis


Status: Chronic





(2) CAD (coronary artery disease)


Status: Chronic





(3) Smoking


Status: Chronic





(4) Low back pain


Status: Chronic





(5) Scrotal edema


Status: Chronic





(6) Prediabetes


Status: Chronic





(7) Neurocognitive disorder


Status: Chronic





(8) Leg edema, right


Status: Acute

## 2016-12-01 NOTE — PN
DATE: 12/01/2016



The patient is evaluated in bed.  No complaints, no distress.  No fever, chills, nausea, vomiting, or
 diarrhea.  The patient remains pending placement.



REVIEW OF SYSTEMS:  Negative.



PHYSICAL EXAMINATION:

GENERAL:  Alert and oriented to his baseline.

HEART:  Regular rate and rhythm.  No murmurs, rubs, or gallops.

LUNGS:  Clear in all fields bilaterally.

ABDOMEN:  Soft, nontender.  Bowel sounds x 4.

EXTREMITIES:  Distal pulses, motor sensation intact.  Cap refill is brisk.



DIAGNOSES AND PLAN:

1.  Coronary artery disease status post cardiac arrest.  Continue medications.

2.  Neurocognitive disorder.  Continue medications.  Needs placement, has guardian in place.  3.  Smo
glory cessation.  Off Nicoderm, doing well.

4.  Chronic low back.  Ibuprofen p.r.n.

5.  Deep venous thrombosis prophylaxis, sequential compression devices, and antiembolism hose, ambula
tion.



We will continue to monitor until safe discharge is established for this patient.





__________________________________________

Fan SILVER







cc:



DD: 12/01/2016 08:06:03  [id number]

TT: 12/01/2016 10:06:19

Confirmation # [id number]

Dictation # [id number]

jn

## 2016-12-02 RX ADMIN — DIVALPROEX SODIUM SCH MG: 250 TABLET, DELAYED RELEASE ORAL at 18:10

## 2016-12-02 RX ADMIN — DIVALPROEX SODIUM SCH MG: 500 TABLET, DELAYED RELEASE ORAL at 08:31

## 2016-12-02 NOTE — CP.PCM.PN
Subjective





- Date & Time of Evaluation


Date of Evaluation: 12/02/16


Time of Evaluation: 06:28





- Subjective


Subjective: 


pending placement


no f/c, n/v/d


no complaints/distress





Objective





- Vital Signs/Intake and Output


Vital Signs (last 24 hours): 


 











Temp Pulse Resp BP Pulse Ox


 


 97.8 F   76   20   108/75   94 L


 


 12/02/16 01:07  12/01/16 22:29  12/01/16 22:29  12/01/16 22:29  12/01/16 22:29











- Medications


Medications: 


 Current Medications





Aspirin (Ecotrin)  81 mg PO DAILY [facility]


   Last Admin: 12/01/16 08:28 Dose:  81 mg


Atorvastatin Calcium (Lipitor)  20 mg PO HS [facility]


   Last Admin: 12/01/16 22:13 Dose:  20 mg


Divalproex Sodium (Depakote Dr(*Bid*))  500 mg PO BID [facility]


   Last Admin: 12/01/16 16:05 Dose:  500 mg


Enalapril Maleate (Vasotec)  10 mg PO DAILY [facility]


   Last Admin: 12/01/16 08:28 Dose:  10 mg


Famotidine (Pepcid)  20 mg PO BID [facility]


   Last Admin: 12/01/16 16:05 Dose:  20 mg


Ibuprofen (Motrin Tab)  600 mg PO Q8 PRN


   PRN Reason: Pain, moderate (4-7)


   Last Admin: 10/31/16 08:42 Dose:  600 mg


Metoprolol Tartrate (Lopressor)  50 mg PO Q12 [facility]


   Last Admin: 12/01/16 21:13 Dose:  50 mg


Quetiapine Fumarate (Seroquel)  25 mg PO HS [facility]


   Last Admin: 12/01/16 22:13 Dose:  25 mg











- Labs


Labs: 


 





 11/29/16 05:30 





 11/29/16 05:30 





 











PT  11.2 SECONDS (9.4-12.0)   12/14/15  05:20    


 


INR  1.05  (0.89-1.20)   12/14/15  05:20    


 


APTT  36.2 SECONDS (23.1-32.0)  H  12/14/15  05:20    














- Constitutional


Appears: Well, Non-toxic, No Acute Distress





- Head Exam


Head Exam: ATRAUMATIC, NORMAL INSPECTION, NORMOCEPHALIC





- Eye Exam


Eye Exam: EOMI, Normal appearance, PERRL


Pupil Exam: NORMAL ACCOMODATION, PERRL





- ENT Exam


ENT Exam: Mucous Membranes Moist, Normal Exam





- Neck Exam


Neck Exam: Full ROM, Normal Inspection.  absent: Lymphadenopathy





- Respiratory Exam


Respiratory Exam: Clear to Ausculation Bilateral, NORMAL BREATHING PATTERN





- Cardiovascular Exam


Cardiovascular Exam: REGULAR RHYTHM, RRR, +S1, +S2.  absent: Murmur





- GI/Abdominal Exam


GI & Abdominal Exam: Soft, Normal Bowel Sounds.  absent: Tenderness





- Extremities Exam


Extremities Exam: Full ROM, Normal Capillary Refill, Normal Inspection.  absent

: Joint Swelling, Pedal Edema





- Back Exam


Back Exam: NORMAL INSPECTION





- Neurological Exam


Neurological Exam: Alert, Awake, CN II-XII Intact, Normal Gait, Oriented x3





- Psychiatric Exam


Psychiatric exam: Normal Affect, Normal Mood





- Skin


Skin Exam: Dry, Intact, Normal Color, Warm





Assessment and Plan


(1) DVT prophylaxis


Status: Chronic





(2) CAD (coronary artery disease)


Status: Chronic





(3) Smoking


Status: Chronic





(4) Low back pain


Status: Chronic





(5) Scrotal edema


Status: Chronic





(6) Prediabetes


Status: Chronic





(7) Neurocognitive disorder


Status: Chronic





(8) Leg edema, right


Status: Acute








- Assessment and Plan (Free Text)


Assessment: 


(1) DVT prophylaxis


Assessment & Plan: 


scd and ae hose


ambulation


Status: Chronic





(2) CAD (coronary artery disease)


Assessment & Plan: 


cont meds


Status: Chronic





(3) Smoking


Assessment & Plan: 


nicoderm


Status: Chronic





(4) Low back pain


Assessment & Plan: 


ibuprofen prn


Status: Chronic





(5) Scrotal edema


Status: Chronic





(6) Prediabetes


Assessment & Plan: 


dietary


Status: Chronic





(7) Neurocognitive disorder


Assessment & Plan: 


cont med


spendign placement


Status: Chronic

## 2016-12-03 RX ADMIN — DIVALPROEX SODIUM SCH MG: 250 TABLET, DELAYED RELEASE ORAL at 17:39

## 2016-12-03 RX ADMIN — DIVALPROEX SODIUM SCH MG: 250 TABLET, DELAYED RELEASE ORAL at 08:56

## 2016-12-03 NOTE — CP.PCM.PN
Subjective





- Date & Time of Evaluation


Date of Evaluation: 12/03/16


Time of Evaluation: 13:24





- Subjective


Subjective: 


pt seen and examined at bedside. 


no acute distress


no new problems noted





Objective





- Vital Signs/Intake and Output


Vital Signs (last 24 hours): 


 











Temp Pulse Resp BP Pulse Ox


 


 97.5 F L  70   18   107/66   100 


 


 12/03/16 08:24  12/03/16 08:58  12/03/16 08:24  12/03/16 08:58  12/03/16 08:24











- Medications


Medications: 


 Current Medications





Aspirin (Ecotrin)  81 mg PO DAILY [facility]


   Last Admin: 12/03/16 08:57 Dose:  81 mg


Atorvastatin Calcium (Lipitor)  20 mg PO [facility]


   Last Admin: 12/02/16 21:46 Dose:  20 mg


Divalproex Sodium (Depakote Dr(*Bid*))  500 mg PO BID [facility]


   Last Admin: 12/03/16 08:56 Dose:  500 mg


Enalapril Maleate (Vasotec)  10 mg PO DAILY [facility]


   Last Admin: 12/03/16 08:59 Dose:  10 mg


Famotidine (Pepcid)  20 mg PO BID [facility]


   Last Admin: 12/03/16 08:58 Dose:  20 mg


Ibuprofen (Motrin Tab)  600 mg PO Q8 PRN


   PRN Reason: Pain, moderate (4-7)


   Last Admin: 10/31/16 08:42 Dose:  600 mg


Metoprolol Tartrate (Lopressor)  50 mg PO Q12 [facility]


   Last Admin: 12/03/16 08:58 Dose:  50 mg


Quetiapine Fumarate (Seroquel)  25 mg PO [facility]


   Last Admin: 12/02/16 21:46 Dose:  25 mg











- Labs


Labs: 


 





 11/29/16 05:30 





 11/29/16 05:30 





 











PT  11.2 SECONDS (9.4-12.0)   12/14/15  05:20    


 


INR  1.05  (0.89-1.20)   12/14/15  05:20    


 


APTT  36.2 SECONDS (23.1-32.0)  H  12/14/15  05:20    














- Head Exam


Head Exam: ATRAUMATIC, NORMAL INSPECTION, NORMOCEPHALIC





- Eye Exam


Eye Exam: Normal appearance





- ENT Exam


ENT Exam: Mucous Membranes Moist





- Respiratory Exam


Respiratory Exam: Clear to Ausculation Bilateral, NORMAL BREATHING PATTERN





- Cardiovascular Exam


Cardiovascular Exam: REGULAR RHYTHM





- GI/Abdominal Exam


GI & Abdominal Exam: Normal Bowel Sounds





- Skin


Skin Exam: Normal Color, Warm





Assessment and Plan





- Assessment and Plan (Free Text)


Plan: 


Assessment and Plan


(1) DVT prophylaxis


Status: Chronic





(2) CAD (coronary artery disease)


Status: Chronic





(3) Smoking


Status: Chronic





(4) Low back pain


Status: Chronic





(5) Scrotal edema


Status: Chronic





(6) Prediabetes


Status: Chronic





(7) Neurocognitive disorder


Status: Chronic





(8) Leg edema, right


Status: Acute

## 2016-12-04 RX ADMIN — DIVALPROEX SODIUM SCH MG: 250 TABLET, DELAYED RELEASE ORAL at 08:55

## 2016-12-04 RX ADMIN — DIVALPROEX SODIUM SCH MG: 250 TABLET, DELAYED RELEASE ORAL at 17:08

## 2016-12-04 NOTE — CP.PCM.PN
Subjective





- Date & Time of Evaluation


Date of Evaluation: 12/04/16


Time of Evaluation: 17:36





- Subjective


Subjective: 


no f/c, n/v/d


no cough/congestion


pending placement


no complaints/distress





Objective





- Vital Signs/Intake and Output


Vital Signs (last 24 hours): 


 











Temp Pulse Resp BP Pulse Ox


 


 97.9 F   66   18   112/75   97 


 


 12/04/16 16:03  12/04/16 16:03  12/04/16 16:03  12/04/16 16:03  12/04/16 16:03











- Medications


Medications: 


 Current Medications





Aspirin (Ecotrin)  81 mg PO DAILY [facility]


   Last Admin: 12/04/16 08:55 Dose:  81 mg


Atorvastatin Calcium (Lipitor)  20 mg PO HS [facility]


   Last Admin: 12/03/16 21:11 Dose:  20 mg


Divalproex Sodium (Depakote Dr(*Bid*))  500 mg PO BID [facility]


   Last Admin: 12/04/16 17:08 Dose:  500 mg


Enalapril Maleate (Vasotec)  10 mg PO DAILY [facility]


   Last Admin: 12/04/16 08:58 Dose:  10 mg


Famotidine (Pepcid)  20 mg PO BID [facility]


   Last Admin: 12/04/16 17:08 Dose:  20 mg


Ibuprofen (Motrin Tab)  600 mg PO Q8 PRN


   PRN Reason: Pain, moderate (4-7)


   Last Admin: 10/31/16 08:42 Dose:  600 mg


Metoprolol Tartrate (Lopressor)  50 mg PO Q12 [facility]


   Last Admin: 12/04/16 08:56 Dose:  50 mg


Quetiapine Fumarate (Seroquel)  25 mg PO [facility]


   Last Admin: 12/03/16 21:30 Dose:  25 mg











- Labs


Labs: 


 





 11/29/16 05:30 





 11/29/16 05:30 





 











PT  11.2 SECONDS (9.4-12.0)   12/14/15  05:20    


 


INR  1.05  (0.89-1.20)   12/14/15  05:20    


 


APTT  36.2 SECONDS (23.1-32.0)  H  12/14/15  05:20    














- Constitutional


Appears: Well, Non-toxic, No Acute Distress





- Head Exam


Head Exam: ATRAUMATIC, NORMAL INSPECTION, NORMOCEPHALIC





- Eye Exam


Eye Exam: EOMI, Normal appearance, PERRL


Pupil Exam: NORMAL ACCOMODATION, PERRL





- ENT Exam


ENT Exam: Mucous Membranes Moist, Normal Exam





- Neck Exam


Neck Exam: Full ROM, Normal Inspection.  absent: Lymphadenopathy





- Respiratory Exam


Respiratory Exam: Clear to Ausculation Bilateral, NORMAL BREATHING PATTERN





- Cardiovascular Exam


Cardiovascular Exam: REGULAR RHYTHM, RRR, +S1, +S2.  absent: Murmur





- GI/Abdominal Exam


GI & Abdominal Exam: Soft, Normal Bowel Sounds.  absent: Tenderness





- Extremities Exam


Extremities Exam: Full ROM, Normal Capillary Refill, Normal Inspection.  absent

: Joint Swelling, Pedal Edema





- Back Exam


Back Exam: NORMAL INSPECTION





- Neurological Exam


Neurological Exam: Alert, Awake, CN II-XII Intact, Normal Gait, Oriented x3





- Psychiatric Exam


Psychiatric exam: Normal Affect, Normal Mood





- Skin


Skin Exam: Dry, Intact, Normal Color, Warm





Assessment and Plan


(1) DVT prophylaxis


Status: Chronic





(2) CAD (coronary artery disease)


Status: Chronic





(3) Smoking


Status: Chronic





(4) Low back pain


Status: Chronic





(5) Scrotal edema


Status: Chronic





(6) Prediabetes


Status: Chronic





(7) Neurocognitive disorder


Status: Chronic





(8) Leg edema, right


Status: Acute








- Assessment and Plan (Free Text)


Assessment: 


(1) DVT prophylaxis


Assessment & Plan: 


scd and ae hose


ambulation


Status: Chronic





(2) CAD (coronary artery disease)


Assessment & Plan: 


cont meds


Status: Chronic





(3) Smoking


Assessment & Plan: 


nicoderm


Status: Chronic





(4) Low back pain


Assessment & Plan: 


ibuprofen prn


Status: Chronic





(5) Scrotal edema


Status: Chronic





(6) Prediabetes


Assessment & Plan: 


dietary


Status: Chronic





(7) Neurocognitive disorder


Assessment & Plan: 


cont med


spendign placement


Status: Chronic

## 2016-12-05 RX ADMIN — DIVALPROEX SODIUM SCH MG: 250 TABLET, DELAYED RELEASE ORAL at 15:59

## 2016-12-05 RX ADMIN — DIVALPROEX SODIUM SCH MG: 250 TABLET, DELAYED RELEASE ORAL at 08:04

## 2016-12-05 NOTE — CP.PCM.PN
Subjective





- Date & Time of Evaluation


Date of Evaluation: 12/05/16


Time of Evaluation: 07:33





- Subjective


Subjective: 


no comlaints/distress


no f/c, n/v/d


pending placement





Objective





- Vital Signs/Intake and Output


Vital Signs (last 24 hours): 


 











Temp Pulse Resp BP Pulse Ox


 


 98.2 F   77   17   143/74   100 


 


 12/04/16 21:46  12/04/16 21:46  12/04/16 21:46  12/04/16 21:46  12/04/16 21:46











- Medications


Medications: 


 Current Medications





Aspirin (Ecotrin)  81 mg PO DAILY [facility]


   Last Admin: 12/04/16 08:55 Dose:  81 mg


Atorvastatin Calcium (Lipitor)  20 mg PO HS [facility]


   Last Admin: 12/04/16 21:46 Dose:  20 mg


Divalproex Sodium (Depakote Dr(*Bid*))  500 mg PO BID [facility]


   Last Admin: 12/04/16 17:08 Dose:  500 mg


Enalapril Maleate (Vasotec)  10 mg PO DAILY [facility]


   Last Admin: 12/04/16 08:58 Dose:  10 mg


Famotidine (Pepcid)  20 mg PO BID [facility]


   Last Admin: 12/04/16 17:08 Dose:  20 mg


Ibuprofen (Motrin Tab)  600 mg PO Q8 PRN


   PRN Reason: Pain, moderate (4-7)


   Last Admin: 10/31/16 08:42 Dose:  600 mg


Metoprolol Tartrate (Lopressor)  50 mg PO Q12 [facility]


   Last Admin: 12/04/16 21:46 Dose:  50 mg


Quetiapine Fumarate (Seroquel)  25 mg PO HS [facility]


   Last Admin: 12/04/16 21:46 Dose:  25 mg











- Labs


Labs: 


 





 11/29/16 05:30 





 11/29/16 05:30 





 











PT  11.2 SECONDS (9.4-12.0)   12/14/15  05:20    


 


INR  1.05  (0.89-1.20)   12/14/15  05:20    


 


APTT  36.2 SECONDS (23.1-32.0)  H  12/14/15  05:20    














- Constitutional


Appears: Well, Non-toxic, No Acute Distress





- Head Exam


Head Exam: ATRAUMATIC, NORMAL INSPECTION, NORMOCEPHALIC





- Eye Exam


Eye Exam: EOMI, Normal appearance, PERRL


Pupil Exam: NORMAL ACCOMODATION, PERRL





- ENT Exam


ENT Exam: Mucous Membranes Moist, Normal Exam





- Neck Exam


Neck Exam: Full ROM, Normal Inspection.  absent: Lymphadenopathy





- Respiratory Exam


Respiratory Exam: Clear to Ausculation Bilateral, NORMAL BREATHING PATTERN





- Cardiovascular Exam


Cardiovascular Exam: REGULAR RHYTHM, RRR, +S1, +S2.  absent: Murmur





- GI/Abdominal Exam


GI & Abdominal Exam: Soft, Normal Bowel Sounds.  absent: Tenderness





- Extremities Exam


Extremities Exam: Full ROM, Normal Capillary Refill, Normal Inspection.  absent

: Joint Swelling, Pedal Edema





- Back Exam


Back Exam: NORMAL INSPECTION





- Neurological Exam


Neurological Exam: Alert, Awake, CN II-XII Intact, Normal Gait, Oriented x3





- Psychiatric Exam


Psychiatric exam: Normal Affect, Normal Mood





- Skin


Skin Exam: Dry, Intact, Normal Color, Warm





Assessment and Plan


(1) DVT prophylaxis


Status: Chronic





(2) CAD (coronary artery disease)


Status: Chronic





(3) Smoking


Status: Chronic





(4) Low back pain


Status: Chronic





(5) Scrotal edema


Status: Chronic





(6) Prediabetes


Status: Chronic





(7) Neurocognitive disorder


Status: Chronic





(8) Leg edema, right


Status: Acute








- Assessment and Plan (Free Text)


Assessment: 


(1) DVT prophylaxis


Assessment & Plan: 


scd and ae hose


ambulation


Status: Chronic





(2) CAD (coronary artery disease)


Assessment & Plan: 


cont meds


Status: Chronic





(3) Smoking


Assessment & Plan: 


nicoderm


Status: Chronic





(4) Low back pain


Assessment & Plan: 


ibuprofen prn


Status: Chronic





(5) Scrotal edema


Status: Chronic





(6) Prediabetes


Assessment & Plan: 


dietary


Status: Chronic





(7) Neurocognitive disorder


Assessment & Plan: 


cont med


spendign placement


Status: Chronic

## 2016-12-06 RX ADMIN — DIVALPROEX SODIUM SCH MG: 250 TABLET, DELAYED RELEASE ORAL at 08:03

## 2016-12-06 RX ADMIN — DIVALPROEX SODIUM SCH MG: 250 TABLET, DELAYED RELEASE ORAL at 16:06

## 2016-12-06 NOTE — CP.PCM.PN
Subjective





- Date & Time of Evaluation


Date of Evaluation: 12/06/16


Time of Evaluation: 07:34





- Subjective


Subjective: 


no complaints offered. no f/c, n/v/d. no distress


pending placement





Objective





- Vital Signs/Intake and Output


Vital Signs (last 24 hours): 


 











Temp Pulse Resp BP Pulse Ox


 


 99.7 F H  76   18   116/65   96 


 


 12/05/16 22:08  12/05/16 22:08  12/05/16 22:08  12/05/16 22:08  12/05/16 22:08











- Medications


Medications: 


 Current Medications





Aspirin (Ecotrin)  81 mg PO DAILY [facility]


   Last Admin: 12/05/16 08:05 Dose:  81 mg


Atorvastatin Calcium (Lipitor)  20 mg PO HS [facility]


   Last Admin: 12/05/16 21:02 Dose:  20 mg


Divalproex Sodium (Depakote Dr(*Bid*))  500 mg PO BID [facility]


   Last Admin: 12/05/16 15:59 Dose:  500 mg


Enalapril Maleate (Vasotec)  10 mg PO DAILY [facility]


   Last Admin: 12/05/16 08:05 Dose:  10 mg


Famotidine (Pepcid)  20 mg PO BID [facility]


   Last Admin: 12/05/16 15:59 Dose:  20 mg


Ibuprofen (Motrin Tab)  600 mg PO Q8 PRN


   PRN Reason: Pain, moderate (4-7)


   Last Admin: 10/31/16 08:42 Dose:  600 mg


Metoprolol Tartrate (Lopressor)  50 mg PO Q12 [facility]


   Last Admin: 12/05/16 21:01 Dose:  50 mg


Quetiapine Fumarate (Seroquel)  25 mg PO [facility]


   Last Admin: 12/05/16 21:02 Dose:  25 mg











- Labs


Labs: 


 





 11/29/16 05:30 





 11/29/16 05:30 





 











PT  11.2 SECONDS (9.4-12.0)   12/14/15  05:20    


 


INR  1.05  (0.89-1.20)   12/14/15  05:20    


 


APTT  36.2 SECONDS (23.1-32.0)  H  12/14/15  05:20    














- Constitutional


Appears: Well, Non-toxic, No Acute Distress





- Head Exam


Head Exam: ATRAUMATIC, NORMAL INSPECTION, NORMOCEPHALIC





- Eye Exam


Eye Exam: EOMI, Normal appearance, PERRL


Pupil Exam: NORMAL ACCOMODATION, PERRL





- ENT Exam


ENT Exam: Mucous Membranes Moist, Normal Exam





- Neck Exam


Neck Exam: Full ROM, Normal Inspection.  absent: Lymphadenopathy





- Respiratory Exam


Respiratory Exam: Clear to Ausculation Bilateral, NORMAL BREATHING PATTERN





- Cardiovascular Exam


Cardiovascular Exam: REGULAR RHYTHM, RRR, +S1, +S2.  absent: Murmur





- GI/Abdominal Exam


GI & Abdominal Exam: Soft, Normal Bowel Sounds.  absent: Tenderness





- Extremities Exam


Extremities Exam: Full ROM, Normal Capillary Refill, Normal Inspection.  absent

: Joint Swelling, Pedal Edema





- Back Exam


Back Exam: NORMAL INSPECTION





- Neurological Exam


Neurological Exam: Alert, Awake, CN II-XII Intact, Normal Gait, Oriented x3





- Psychiatric Exam


Psychiatric exam: Normal Affect, Normal Mood





- Skin


Skin Exam: Dry, Intact, Normal Color, Warm





Assessment and Plan


(1) DVT prophylaxis


Status: Chronic





(2) CAD (coronary artery disease)


Status: Chronic





(3) Smoking


Status: Chronic





(4) Low back pain


Status: Chronic





(5) Scrotal edema


Status: Chronic





(6) Prediabetes


Status: Chronic





(7) Neurocognitive disorder


Status: Chronic





(8) Leg edema, right


Status: Acute








- Assessment and Plan (Free Text)


Assessment: 


(1) DVT prophylaxis


Assessment & Plan: 


scd and ae hose


ambulation


Status: Chronic





(2) CAD (coronary artery disease)


Assessment & Plan: 


cont meds


Status: Chronic





(3) Smoking


Assessment & Plan: 


nicoderm


Status: Chronic





(4) Low back pain


Assessment & Plan: 


ibuprofen prn


Status: Chronic





(5) Scrotal edema


Status: Chronic





(6) Prediabetes


Assessment & Plan: 


dietary


Status: Chronic





(7) Neurocognitive disorder


Assessment & Plan: 


cont med


spendign placement


Status: Chronic

## 2016-12-07 RX ADMIN — DIVALPROEX SODIUM SCH MG: 250 TABLET, DELAYED RELEASE ORAL at 08:43

## 2016-12-07 RX ADMIN — DIVALPROEX SODIUM SCH MG: 250 TABLET, DELAYED RELEASE ORAL at 16:49

## 2016-12-07 NOTE — CP.PCM.PN
Subjective





- Date & Time of Evaluation


Date of Evaluation: 12/07/16


Time of Evaluation: 08:12





- Subjective


Subjective: 


no f/c, n/v/d


no complaints/distress


pending placement





Objective





- Vital Signs/Intake and Output


Vital Signs (last 24 hours): 


 











Temp Pulse Resp BP Pulse Ox


 


 98.2 F   76   18   120/71   98 


 


 12/06/16 22:13  12/06/16 22:13  12/06/16 22:13  12/06/16 22:13  12/06/16 22:13











- Medications


Medications: 


 Current Medications





Aspirin (Ecotrin)  81 mg PO DAILY [facility]


   Last Admin: 12/06/16 08:04 Dose:  81 mg


Atorvastatin Calcium (Lipitor)  20 mg PO HS [facility]


   Last Admin: 12/06/16 21:18 Dose:  20 mg


Divalproex Sodium (Depakote Dr(*Bid*))  500 mg PO BID [facility]


   Last Admin: 12/06/16 16:06 Dose:  500 mg


Enalapril Maleate (Vasotec)  10 mg PO DAILY [facility]


   Last Admin: 12/06/16 08:04 Dose:  10 mg


Famotidine (Pepcid)  20 mg PO BID [facility]


   Last Admin: 12/06/16 16:07 Dose:  20 mg


Ibuprofen (Motrin Tab)  600 mg PO Q8 PRN


   PRN Reason: Pain, moderate (4-7)


   Last Admin: 10/31/16 08:42 Dose:  600 mg


Metoprolol Tartrate (Lopressor)  50 mg PO Q12 [facility]


   Last Admin: 12/06/16 21:18 Dose:  50 mg


Quetiapine Fumarate (Seroquel)  25 mg PO HS [facility]


   Last Admin: 12/06/16 21:51 Dose:  25 mg











- Labs


Labs: 


 





 11/29/16 05:30 





 11/29/16 05:30 





 











PT  11.2 SECONDS (9.4-12.0)   12/14/15  05:20    


 


INR  1.05  (0.89-1.20)   12/14/15  05:20    


 


APTT  36.2 SECONDS (23.1-32.0)  H  12/14/15  05:20    














- Constitutional


Appears: Well, Non-toxic, No Acute Distress





- Head Exam


Head Exam: ATRAUMATIC, NORMAL INSPECTION, NORMOCEPHALIC





- Eye Exam


Eye Exam: EOMI, Normal appearance, PERRL


Pupil Exam: NORMAL ACCOMODATION, PERRL





- ENT Exam


ENT Exam: Mucous Membranes Moist, Normal Exam





- Neck Exam


Neck Exam: Full ROM, Normal Inspection.  absent: Lymphadenopathy





- Respiratory Exam


Respiratory Exam: Clear to Ausculation Bilateral, NORMAL BREATHING PATTERN





- Cardiovascular Exam


Cardiovascular Exam: REGULAR RHYTHM, RRR, +S1, +S2.  absent: Murmur





- GI/Abdominal Exam


GI & Abdominal Exam: Soft, Normal Bowel Sounds.  absent: Tenderness





- Extremities Exam


Extremities Exam: Full ROM, Normal Capillary Refill, Normal Inspection.  absent

: Joint Swelling, Pedal Edema





- Back Exam


Back Exam: NORMAL INSPECTION





- Neurological Exam


Neurological Exam: Alert, Awake, CN II-XII Intact, Normal Gait, Oriented x3





- Psychiatric Exam


Psychiatric exam: Normal Affect, Normal Mood





- Skin


Skin Exam: Dry, Intact, Normal Color, Warm





Assessment and Plan


(1) DVT prophylaxis


Status: Chronic





(2) CAD (coronary artery disease)


Status: Chronic





(3) Smoking


Status: Chronic





(4) Low back pain


Status: Chronic





(5) Scrotal edema


Status: Chronic





(6) Prediabetes


Status: Chronic





(7) Neurocognitive disorder


Status: Chronic





(8) Leg edema, right


Status: Acute








- Assessment and Plan (Free Text)


Assessment: 


(1) DVT prophylaxis


Assessment & Plan: 


scd and ae hose


ambulation


Status: Chronic





(2) CAD (coronary artery disease)


Assessment & Plan: 


cont meds


Status: Chronic





(3) Smoking


Assessment & Plan: 


nicoderm


Status: Chronic





(4) Low back pain


Assessment & Plan: 


ibuprofen prn


Status: Chronic





(5) Scrotal edema


Status: Chronic





(6) Prediabetes


Assessment & Plan: 


dietary


Status: Chronic





(7) Neurocognitive disorder


Assessment & Plan: 


cont med


spendign placement


Status: Chronic

## 2016-12-08 RX ADMIN — DIVALPROEX SODIUM SCH MG: 250 TABLET, DELAYED RELEASE ORAL at 16:51

## 2016-12-08 RX ADMIN — DIVALPROEX SODIUM SCH MG: 250 TABLET, DELAYED RELEASE ORAL at 09:05

## 2016-12-08 NOTE — PN
DATE: 12/08/2016



The patient has been evaluated at bedside.  Guardian is in place, pending 
placement.  No fever, chills, nausea, vomiting or diarrhea.  No complaints.



REVIEW OF SYSTEMS:  Negative.



PHYSICAL EXAMINATION:

GENERAL:  Alert, oriented to his baseline.

HEART:  Regular rate and rhythm.  No murmurs, rubs, or gallops.

LUNGS:  Clear in all fields bilaterally. 

ABDOMEN:  Soft, nontender.  Bowel sounds x 4.

EXTREMITIES:  Distal pulses, motor and sensation intact.  Cap refill is brisk.



DIAGNOSES AND PLAN:

1.  Coronary artery disease, status post cardiac arrest:  Continue medications.
  

2.  neurocognitive disorder:  Continue medications pending placement.

3.  Deep venous thrombosis prophylaxis:  SCD and AE hose, ambulation. 

4.  Prediabetes:  Continue dietary control.  

5.  Will continue to follow  for safe discharge.





__________________________________________

Fan SILVER







cc:   



DD: 12/08/2016 07:45:26  [id number]

TT: 12/08/2016 08:25:10

Confirmation # [id number]

Dictation # [id number]

fany JONAS

## 2016-12-08 NOTE — CP.PCM.PN
Subjective





- Date & Time of Evaluation


Date of Evaluation: 12/08/16


Time of Evaluation: 07:08





- Subjective


Subjective: 


no complaints/idstress


no f/c, n/v/d


pending placement


dictated note





Objective





- Vital Signs/Intake and Output


Vital Signs (last 24 hours): 


 











Temp Pulse Resp BP Pulse Ox


 


 99.5 F   76   20   97/47 L  96 


 


 12/07/16 22:23  12/07/16 22:23  12/07/16 22:23  12/07/16 22:23  12/07/16 22:23











- Medications


Medications: 


 Current Medications





Aspirin (Ecotrin)  81 mg PO DAILY [facility]


   Last Admin: 12/07/16 08:42 Dose:  81 mg


Atorvastatin Calcium (Lipitor)  20 mg PO HS [facility]


   Last Admin: 12/07/16 21:28 Dose:  20 mg


Divalproex Sodium (Depakote Dr(*Bid*))  500 mg PO BID [facility]


   Last Admin: 12/07/16 16:49 Dose:  500 mg


Enalapril Maleate (Vasotec)  10 mg PO DAILY [facility]


   Last Admin: 12/07/16 08:42 Dose:  10 mg


Famotidine (Pepcid)  20 mg PO BID [facility]


   Last Admin: 12/07/16 16:49 Dose:  20 mg


Ibuprofen (Motrin Tab)  600 mg PO Q8 PRN


   PRN Reason: Pain, moderate (4-7)


   Last Admin: 10/31/16 08:42 Dose:  600 mg


Metoprolol Tartrate (Lopressor)  50 mg PO Q12 [facility]


   Last Admin: 12/07/16 20:28 Dose:  50 mg


Quetiapine Fumarate (Seroquel)  25 mg PO HS [facility]


   Last Admin: 12/07/16 21:28 Dose:  25 mg











- Labs


Labs: 


 





 11/29/16 05:30 





 11/29/16 05:30 





 











PT  11.2 SECONDS (9.4-12.0)   12/14/15  05:20    


 


INR  1.05  (0.89-1.20)   12/14/15  05:20    


 


APTT  36.2 SECONDS (23.1-32.0)  H  12/14/15  05:20    














Assessment and Plan


(1) DVT prophylaxis


Status: Chronic





(2) CAD (coronary artery disease)


Status: Chronic





(3) Smoking


Status: Chronic





(4) Low back pain


Status: Chronic





(5) Scrotal edema


Status: Chronic





(6) Prediabetes


Status: Chronic





(7) Neurocognitive disorder


Status: Chronic





(8) Leg edema, right


Status: Acute

## 2016-12-09 RX ADMIN — DIVALPROEX SODIUM SCH MG: 250 TABLET, DELAYED RELEASE ORAL at 16:31

## 2016-12-09 RX ADMIN — DIVALPROEX SODIUM SCH MG: 250 TABLET, DELAYED RELEASE ORAL at 08:31

## 2016-12-09 NOTE — CP.PCM.PN
Subjective





- Date & Time of Evaluation


Date of Evaluation: 12/09/16


Time of Evaluation: 07:37





- Subjective


Subjective: 


no f/c, n/v/d


pending placement


n complaints/distress





Objective





- Vital Signs/Intake and Output


Vital Signs (last 24 hours): 


 











Temp Pulse Resp BP Pulse Ox


 


 98.2 F   80   20   109/67   99 


 


 12/08/16 21:52  12/08/16 21:52  12/08/16 21:52  12/08/16 21:52  12/08/16 21:52











- Medications


Medications: 


 Current Medications





Aspirin (Ecotrin)  81 mg PO DAILY [facility]


   Last Admin: 12/08/16 09:05 Dose:  81 mg


Atorvastatin Calcium (Lipitor)  20 mg PO HS [facility]


   Last Admin: 12/08/16 21:34 Dose:  20 mg


Divalproex Sodium (Depakote Dr(*Bid*))  500 mg PO BID [facility]


   Last Admin: 12/08/16 16:51 Dose:  500 mg


Enalapril Maleate (Vasotec)  10 mg PO DAILY [facility]


   Last Admin: 12/08/16 09:05 Dose:  10 mg


Famotidine (Pepcid)  20 mg PO BID [facility]


   Last Admin: 12/08/16 16:51 Dose:  20 mg


Ibuprofen (Motrin Tab)  600 mg PO Q8 PRN


   PRN Reason: Pain, moderate (4-7)


   Last Admin: 10/31/16 08:42 Dose:  600 mg


Metoprolol Tartrate (Lopressor)  50 mg PO Q12 [facility]


   Last Admin: 12/08/16 21:34 Dose:  50 mg


Quetiapine Fumarate (Seroquel)  25 mg PO HS [facility]


   Last Admin: 12/08/16 21:34 Dose:  25 mg











- Labs


Labs: 


 





 11/29/16 05:30 





 11/29/16 05:30 





 











PT  11.2 SECONDS (9.4-12.0)   12/14/15  05:20    


 


INR  1.05  (0.89-1.20)   12/14/15  05:20    


 


APTT  36.2 SECONDS (23.1-32.0)  H  12/14/15  05:20    














- Constitutional


Appears: Well, Non-toxic, No Acute Distress





- Head Exam


Head Exam: ATRAUMATIC, NORMAL INSPECTION, NORMOCEPHALIC





- Eye Exam


Eye Exam: EOMI, Normal appearance, PERRL


Pupil Exam: NORMAL ACCOMODATION, PERRL





- ENT Exam


ENT Exam: Mucous Membranes Moist, Normal Exam





- Neck Exam


Neck Exam: Full ROM, Normal Inspection.  absent: Lymphadenopathy





- Respiratory Exam


Respiratory Exam: Clear to Ausculation Bilateral, NORMAL BREATHING PATTERN





- Cardiovascular Exam


Cardiovascular Exam: REGULAR RHYTHM, +S1, +S2.  absent: Murmur





- GI/Abdominal Exam


GI & Abdominal Exam: Soft, Normal Bowel Sounds.  absent: Tenderness





- Extremities Exam


Extremities Exam: Full ROM, Normal Capillary Refill, Normal Inspection.  absent

: Joint Swelling, Pedal Edema





- Back Exam


Back Exam: NORMAL INSPECTION





- Neurological Exam


Neurological Exam: Alert, Awake, CN II-XII Intact, Normal Gait, Oriented x3





- Psychiatric Exam


Psychiatric exam: Normal Affect, Normal Mood





- Skin


Skin Exam: Dry, Intact, Normal Color, Warm





Assessment and Plan


(1) DVT prophylaxis


Status: Chronic





(2) CAD (coronary artery disease)


Status: Chronic





(3) Smoking


Status: Chronic





(4) Low back pain


Status: Chronic





(5) Scrotal edema


Status: Chronic





(6) Prediabetes


Status: Chronic





(7) Neurocognitive disorder


Status: Chronic





(8) Leg edema, right


Status: Acute








- Assessment and Plan (Free Text)


Assessment: 


(1) DVT prophylaxis


Assessment & Plan: 


scd and ae hose


ambulation


Status: Chronic





(2) CAD (coronary artery disease)


Assessment & Plan: 


cont meds


Status: Chronic





(3) Smoking


Assessment & Plan: 


nicoderm


Status: Chronic





(4) Low back pain


Assessment & Plan: 


ibuprofen prn


Status: Chronic





(5) Scrotal edema


Status: Chronic





(6) Prediabetes


Assessment & Plan: 


dietary


Status: Chronic





(7) Neurocognitive disorder


Assessment & Plan: 


cont med


spendign placement


Status: Chronic

## 2016-12-10 RX ADMIN — DIVALPROEX SODIUM SCH MG: 250 TABLET, DELAYED RELEASE ORAL at 16:08

## 2016-12-10 RX ADMIN — DIVALPROEX SODIUM SCH MG: 250 TABLET, DELAYED RELEASE ORAL at 09:03

## 2016-12-10 NOTE — CP.PCM.PN
Subjective





- Date & Time of Evaluation


Date of Evaluation: 12/10/16


Time of Evaluation: 07:00





- Subjective


Subjective: 


no complaints/distress


no f/c, n/v/d


pending palcement





Objective





- Vital Signs/Intake and Output


Vital Signs (last 24 hours): 


 











Temp Pulse Resp BP Pulse Ox


 


 98.6 F   73   20   114/68   100 


 


 12/09/16 21:47  12/09/16 21:47  12/09/16 21:47  12/09/16 21:47  12/09/16 21:47











- Medications


Medications: 


 Current Medications





Aspirin (Ecotrin)  81 mg PO DAILY [facility]


   Last Admin: 12/09/16 08:32 Dose:  81 mg


Atorvastatin Calcium (Lipitor)  20 mg PO HS [facility]


   Last Admin: 12/09/16 21:23 Dose:  20 mg


Divalproex Sodium (Depakote Dr(*Bid*))  500 mg PO BID [facility]


   Last Admin: 12/09/16 16:31 Dose:  500 mg


Enalapril Maleate (Vasotec)  10 mg PO DAILY [facility]


   Last Admin: 12/09/16 08:32 Dose:  10 mg


Famotidine (Pepcid)  20 mg PO BID [facility]


   Last Admin: 12/09/16 16:31 Dose:  20 mg


Ibuprofen (Motrin Tab)  600 mg PO Q8 PRN


   PRN Reason: Pain, moderate (4-7)


   Last Admin: 10/31/16 08:42 Dose:  600 mg


Metoprolol Tartrate (Lopressor)  50 mg PO Q12 [facility]


   Last Admin: 12/09/16 21:23 Dose:  50 mg


Quetiapine Fumarate (Seroquel)  25 mg PO HS [facility]


   Last Admin: 12/09/16 21:23 Dose:  25 mg











- Labs


Labs: 


 





 11/29/16 05:30 





 11/29/16 05:30 





 











PT  11.2 SECONDS (9.4-12.0)   12/14/15  05:20    


 


INR  1.05  (0.89-1.20)   12/14/15  05:20    


 


APTT  36.2 SECONDS (23.1-32.0)  H  12/14/15  05:20    














- Constitutional


Appears: Well, Non-toxic, No Acute Distress





- Head Exam


Head Exam: ATRAUMATIC, NORMAL INSPECTION, NORMOCEPHALIC





- Eye Exam


Eye Exam: EOMI, Normal appearance, PERRL


Pupil Exam: NORMAL ACCOMODATION, PERRL





- ENT Exam


ENT Exam: Mucous Membranes Moist, Normal Exam





- Neck Exam


Neck Exam: Full ROM, Normal Inspection.  absent: Lymphadenopathy





- Respiratory Exam


Respiratory Exam: Clear to Ausculation Bilateral, NORMAL BREATHING PATTERN





- Cardiovascular Exam


Cardiovascular Exam: REGULAR RHYTHM, RRR, +S1, +S2.  absent: Murmur





- GI/Abdominal Exam


GI & Abdominal Exam: Soft, Normal Bowel Sounds.  absent: Tenderness





- Extremities Exam


Extremities Exam: Full ROM, Normal Capillary Refill, Normal Inspection.  absent

: Joint Swelling, Pedal Edema





- Back Exam


Back Exam: NORMAL INSPECTION





- Neurological Exam


Neurological Exam: Alert, Awake, CN II-XII Intact, Normal Gait, Oriented x3





- Psychiatric Exam


Psychiatric exam: Normal Affect, Normal Mood





- Skin


Skin Exam: Dry, Intact, Normal Color, Warm





Assessment and Plan


(1) DVT prophylaxis


Status: Chronic





(2) CAD (coronary artery disease)


Status: Chronic





(3) Smoking


Status: Chronic





(4) Low back pain


Status: Chronic





(5) Scrotal edema


Status: Chronic





(6) Prediabetes


Status: Chronic





(7) Neurocognitive disorder


Status: Chronic





(8) Leg edema, right


Status: Acute








- Assessment and Plan (Free Text)


Assessment: 


(1) DVT prophylaxis


Assessment & Plan: 


scd and ae hose


ambulation


Status: Chronic





(2) CAD (coronary artery disease)


Assessment & Plan: 


cont meds


Status: Chronic





(3) Smoking


Assessment & Plan: 


nicoderm


Status: Chronic





(4) Low back pain


Assessment & Plan: 


ibuprofen prn


Status: Chronic





(5) Scrotal edema


Status: Chronic





(6) Prediabetes


Assessment & Plan: 


dietary


Status: Chronic





(7) Neurocognitive disorder


Assessment & Plan: 


cont med


spendign placement


Status: Chronic

## 2016-12-11 RX ADMIN — DIVALPROEX SODIUM SCH MG: 250 TABLET, DELAYED RELEASE ORAL at 08:19

## 2016-12-11 RX ADMIN — DIVALPROEX SODIUM SCH MG: 250 TABLET, DELAYED RELEASE ORAL at 17:07

## 2016-12-11 NOTE — CP.[email or web address].PN
Subjective





- Date & Time of Evaluation


Date of Evaluation: 12/11/16


Time of Evaluation: 10:57





- Subjective


Subjective: 


no complaints/distress


no f/c, n/v/d


pending placement





Objective





- Vital Signs/Intake and Output


Vital Signs (last 24 hours): 


 











Temp Pulse Resp BP Pulse Ox


 


 98.1 F   98 H  20   112/78   98 


 


 12/11/16 08:09  12/11/16 08:09  12/11/16 08:09  12/11/16 08:18  12/11/16 08:09











- Medications


Medications: 


 Current Medications





Aspirin (Ecotrin)  81 mg PO DAILY [facility]


   Last Admin: 12/11/16 08:19 Dose:  81 mg


Atorvastatin Calcium (Lipitor)  20 mg PO HS [facility]


   Last Admin: 12/10/16 21:31 Dose:  20 mg


Divalproex Sodium (Depakote Dr(*Bid*))  500 mg PO BID [facility]


   Last Admin: 12/11/16 08:19 Dose:  500 mg


Enalapril Maleate (Vasotec)  10 mg PO DAILY [facility]


   Last Admin: 12/11/16 08:21 Dose:  10 mg


Famotidine (Pepcid)  20 mg PO BID [facility]


   Last Admin: 12/11/16 08:19 Dose:  20 mg


Ibuprofen (Motrin Tab)  600 mg PO Q8 PRN


   PRN Reason: Pain, moderate (4-7)


   Last Admin: 10/31/16 08:42 Dose:  600 mg


Metoprolol Tartrate (Lopressor)  50 mg PO Q12 [facility]


   Last Admin: 12/11/16 08:18 Dose:  50 mg


Quetiapine Fumarate (Seroquel)  25 mg PO HS [facility]


   Last Admin: 12/10/16 21:31 Dose:  25 mg











- Labs


Labs: 


 





 11/29/16 05:30 





 11/29/16 05:30 





 











PT  11.2 SECONDS (9.4-12.0)   12/14/15  05:20    


 


INR  1.05  (0.89-1.20)   12/14/15  05:20    


 


APTT  36.2 SECONDS (23.1-32.0)  H  12/14/15  05:20    














- Constitutional


Appears: Well, Non-toxic, No Acute Distress





- Head Exam


Head Exam: ATRAUMATIC, NORMAL INSPECTION, NORMOCEPHALIC





- Eye Exam


Eye Exam: EOMI, Normal appearance, PERRL


Pupil Exam: NORMAL ACCOMODATION, PERRL





- ENT Exam


ENT Exam: Mucous Membranes Moist, Normal Exam





- Neck Exam


Neck Exam: Full ROM, Normal Inspection.  absent: Lymphadenopathy





- Respiratory Exam


Respiratory Exam: Clear to Ausculation Bilateral, NORMAL BREATHING PATTERN





- Cardiovascular Exam


Cardiovascular Exam: REGULAR RHYTHM, RRR, +S1, +S2.  absent: Murmur





- GI/Abdominal Exam


GI & Abdominal Exam: Soft, Normal Bowel Sounds.  absent: Tenderness





- Extremities Exam


Extremities Exam: Full ROM, Normal Capillary Refill, Normal Inspection.  absent

: Joint Swelling, Pedal Edema





- Back Exam


Back Exam: NORMAL INSPECTION





- Neurological Exam


Neurological Exam: Alert, Awake, CN II-XII Intact, Normal Gait, Oriented x3





- Psychiatric Exam


Psychiatric exam: Normal Affect, Normal Mood





- Skin


Skin Exam: Dry, Intact, Normal Color, Warm





Assessment and Plan


(1) DVT prophylaxis


Status: Chronic





(2) CAD (coronary artery disease)


Status: Chronic





(3) Smoking


Status: Chronic





(4) Low back pain


Status: Chronic





(5) Scrotal edema


Status: Chronic





(6) Prediabetes


Status: Chronic





(7) Neurocognitive disorder


Status: Chronic





(8) Leg edema, right


Status: Acute








- Assessment and Plan (Free Text)


Assessment: 


(1) DVT prophylaxis


Assessment & Plan: 


scd and ae hose


ambulation


Status: Chronic





(2) CAD (coronary artery disease)


Assessment & Plan: 


cont meds


Status: Chronic





(3) Smoking


Assessment & Plan: 


nicoderm


Status: Chronic





(4) Low back pain


Assessment & Plan: 


ibuprofen prn


Status: Chronic





(5) Scrotal edema


Status: Chronic





(6) Prediabetes


Assessment & Plan: 


dietary


Status: Chronic





(7) Neurocognitive disorder


Assessment & Plan: 


cont med


spendign placement


Status: Chronic

## 2016-12-12 RX ADMIN — DIVALPROEX SODIUM SCH MG: 250 TABLET, DELAYED RELEASE ORAL at 15:59

## 2016-12-12 RX ADMIN — DIVALPROEX SODIUM SCH MG: 250 TABLET, DELAYED RELEASE ORAL at 07:59

## 2016-12-12 NOTE — CP.[email or web address].PN
Subjective





- Date & Time of Evaluation


Date of Evaluation: 12/12/16


Time of Evaluation: 07:20





- Subjective


Subjective: 


no complaints/distress


no f/c, n/v/d


pending palcement





Objective





- Vital Signs/Intake and Output


Vital Signs (last 24 hours): 


 











Temp Pulse Resp BP Pulse Ox


 


 98.1 F   79   18   117/73   100 


 


 12/11/16 20:13  12/11/16 20:58  12/11/16 20:13  12/11/16 20:58  12/11/16 20:13











- Medications


Medications: 


 Current Medications





Aspirin (Ecotrin)  81 mg PO DAILY [facility]


   Last Admin: 12/11/16 08:19 Dose:  81 mg


Atorvastatin Calcium (Lipitor)  20 mg PO HS [facility]


   Last Admin: 12/11/16 21:01 Dose:  20 mg


Divalproex Sodium (Depakote Dr(*Bid*))  500 mg PO BID [facility]


   Last Admin: 12/11/16 17:07 Dose:  500 mg


Enalapril Maleate (Vasotec)  10 mg PO DAILY [facility]


   Last Admin: 12/11/16 08:21 Dose:  10 mg


Famotidine (Pepcid)  20 mg PO BID [facility]


   Last Admin: 12/11/16 17:07 Dose:  20 mg


Ibuprofen (Motrin Tab)  600 mg PO Q8 PRN


   PRN Reason: Pain, moderate (4-7)


   Last Admin: 10/31/16 08:42 Dose:  600 mg


Metoprolol Tartrate (Lopressor)  50 mg PO Q12 [facility]


   Last Admin: 12/11/16 20:58 Dose:  50 mg


Quetiapine Fumarate (Seroquel)  25 mg PO HS [facility]


   Last Admin: 12/11/16 21:00 Dose:  25 mg











- Labs


Labs: 


 





 11/29/16 05:30 





 11/29/16 05:30 





 











PT  11.2 SECONDS (9.4-12.0)   12/14/15  05:20    


 


INR  1.05  (0.89-1.20)   12/14/15  05:20    


 


APTT  36.2 SECONDS (23.1-32.0)  H  12/14/15  05:20    














- Constitutional


Appears: Well, Non-toxic, No Acute Distress





- Head Exam


Head Exam: ATRAUMATIC, NORMAL INSPECTION, NORMOCEPHALIC





- Eye Exam


Eye Exam: EOMI, Normal appearance, PERRL


Pupil Exam: NORMAL ACCOMODATION, PERRL





- ENT Exam


ENT Exam: Mucous Membranes Moist, Normal Exam





- Neck Exam


Neck Exam: Full ROM, Normal Inspection.  absent: Lymphadenopathy





- Respiratory Exam


Respiratory Exam: Clear to Ausculation Bilateral, NORMAL BREATHING PATTERN





- Cardiovascular Exam


Cardiovascular Exam: REGULAR RHYTHM, RRR, +S1, +S2.  absent: Murmur





- GI/Abdominal Exam


GI & Abdominal Exam: Soft, Normal Bowel Sounds.  absent: Tenderness





- Extremities Exam


Extremities Exam: Full ROM, Normal Capillary Refill, Normal Inspection.  absent

: Joint Swelling, Pedal Edema





- Back Exam


Back Exam: NORMAL INSPECTION





- Neurological Exam


Neurological Exam: Alert, Awake, CN II-XII Intact, Normal Gait, Oriented x3





- Psychiatric Exam


Psychiatric exam: Normal Affect, Normal Mood





- Skin


Skin Exam: Dry, Intact, Normal Color, Warm





Assessment and Plan


(1) DVT prophylaxis


Status: Chronic





(2) CAD (coronary artery disease)


Status: Chronic





(3) Smoking


Status: Chronic





(4) Low back pain


Status: Chronic





(5) Scrotal edema


Status: Chronic





(6) Prediabetes


Status: Chronic





(7) Neurocognitive disorder


Status: Chronic





(8) Leg edema, right


Status: Acute








- Assessment and Plan (Free Text)


Assessment: 


(1) DVT prophylaxis


Assessment & Plan: 


scd and ae hose


ambulation


Status: Chronic





(2) CAD (coronary artery disease)


Assessment & Plan: 


cont meds


Status: Chronic





(3) Smoking


Assessment & Plan: 


nicoderm


Status: Chronic





(4) Low back pain


Assessment & Plan: 


ibuprofen prn


Status: Chronic





(5) Scrotal edema


Status: Chronic





(6) Prediabetes


Assessment & Plan: 


dietary


Status: Chronic





(7) Neurocognitive disorder


Assessment & Plan: 


cont med


spendign placement


Status: Chronic

## 2016-12-13 RX ADMIN — DIVALPROEX SODIUM SCH MG: 250 TABLET, DELAYED RELEASE ORAL at 08:32

## 2016-12-13 RX ADMIN — DIVALPROEX SODIUM SCH MG: 250 TABLET, DELAYED RELEASE ORAL at 17:36

## 2016-12-13 NOTE — CP.[email or web address].PN
Subjective





- Date & Time of Evaluation


Date of Evaluation: 12/13/16


Time of Evaluation: 07:27





- Subjective


Subjective: 


no f/c, n/v/d


pendingplacement


no complaints/distress





Objective





- Vital Signs/Intake and Output


Vital Signs (last 24 hours): 


 











Temp Pulse Resp BP Pulse Ox


 


 98.2 F   77   20   103/61   99 


 


 12/12/16 21:54  12/12/16 21:54  12/12/16 21:54  12/12/16 21:54  12/12/16 21:54











- Medications


Medications: 


 Current Medications





Aspirin (Ecotrin)  81 mg PO DAILY [facility]


   Last Admin: 12/12/16 07:59 Dose:  81 mg


Atorvastatin Calcium (Lipitor)  20 mg PO HS [facility]


   Last Admin: 12/12/16 21:47 Dose:  20 mg


Divalproex Sodium (Depakote Dr(*Bid*))  500 mg PO BID [facility]


   Last Admin: 12/12/16 15:59 Dose:  500 mg


Enalapril Maleate (Vasotec)  10 mg PO DAILY [facility]


   Last Admin: 12/12/16 07:59 Dose:  10 mg


Famotidine (Pepcid)  20 mg PO BID [facility]


   Last Admin: 12/12/16 15:59 Dose:  20 mg


Ibuprofen (Motrin Tab)  600 mg PO Q8 PRN


   PRN Reason: Pain, moderate (4-7)


   Last Admin: 10/31/16 08:42 Dose:  600 mg


Metoprolol Tartrate (Lopressor)  50 mg PO Q12 [facility]


   Last Admin: 12/12/16 21:48 Dose:  50 mg


Quetiapine Fumarate (Seroquel)  25 mg PO HS [facility]


   Last Admin: 12/12/16 21:49 Dose:  25 mg











- Labs


Labs: 


 





 11/29/16 05:30 





 11/29/16 05:30 





 











PT  11.2 SECONDS (9.4-12.0)   12/14/15  05:20    


 


INR  1.05  (0.89-1.20)   12/14/15  05:20    


 


APTT  36.2 SECONDS (23.1-32.0)  H  12/14/15  05:20    














- Constitutional


Appears: Well, Non-toxic, No Acute Distress





- Head Exam


Head Exam: ATRAUMATIC, NORMAL INSPECTION, NORMOCEPHALIC





- Eye Exam


Eye Exam: EOMI, Normal appearance, PERRL


Pupil Exam: NORMAL ACCOMODATION, PERRL





- ENT Exam


ENT Exam: Mucous Membranes Moist, Normal Exam





- Neck Exam


Neck Exam: Full ROM, Normal Inspection.  absent: Lymphadenopathy





- Respiratory Exam


Respiratory Exam: Clear to Ausculation Bilateral, NORMAL BREATHING PATTERN





- Cardiovascular Exam


Cardiovascular Exam: REGULAR RHYTHM, RRR, +S1, +S2.  absent: Murmur





- GI/Abdominal Exam


GI & Abdominal Exam: Soft, Normal Bowel Sounds.  absent: Tenderness





- Rectal Exam


Rectal Exam: NORMAL INSPECTION





- Extremities Exam


Extremities Exam: Full ROM, Normal Capillary Refill, Normal Inspection.  absent

: Joint Swelling, Pedal Edema





- Back Exam


Back Exam: NORMAL INSPECTION





- Neurological Exam


Neurological Exam: Alert, Awake, CN II-XII Intact, Normal Gait, Oriented x3





- Psychiatric Exam


Psychiatric exam: Normal Affect, Normal Mood





- Skin


Skin Exam: Dry, Intact, Normal Color, Warm





Assessment and Plan


(1) DVT prophylaxis


Status: Chronic





(2) CAD (coronary artery disease)


Status: Chronic





(3) Smoking


Status: Chronic





(4) Low back pain


Status: Chronic





(5) Scrotal edema


Status: Chronic





(6) Prediabetes


Status: Chronic





(7) Neurocognitive disorder


Status: Chronic





(8) Leg edema, right


Status: Acute








- Assessment and Plan (Free Text)


Assessment: 


(1) DVT prophylaxis


Assessment & Plan: 


scd and ae hose


ambulation


Status: Chronic





(2) CAD (coronary artery disease)


Assessment & Plan: 


cont meds


Status: Chronic





(3) Smoking


Assessment & Plan: 


nicoderm


Status: Chronic





(4) Low back pain


Assessment & Plan: 


ibuprofen prn


Status: Chronic





(5) Scrotal edema


Status: Chronic





(6) Prediabetes


Assessment & Plan: 


dietary


Status: Chronic





(7) Neurocognitive disorder


Assessment & Plan: 


cont med


spendign placement


Status: Chronic

## 2016-12-14 RX ADMIN — DIVALPROEX SODIUM SCH MG: 250 TABLET, DELAYED RELEASE ORAL at 17:07

## 2016-12-14 RX ADMIN — DIVALPROEX SODIUM SCH MG: 250 TABLET, DELAYED RELEASE ORAL at 08:31

## 2016-12-14 NOTE — CP.[email or web address].PN
Subjective





- Date & Time of Evaluation


Date of Evaluation: 12/14/16


Time of Evaluation: 07:10





- Subjective


Subjective: 


no f/c, n/v/d


no complaints or distress


pending placement





Objective





- Vital Signs/Intake and Output


Vital Signs (last 24 hours): 


 











Temp Pulse Resp BP Pulse Ox


 


 98.2 F   78   18   116/71   98 


 


 12/13/16 22:01  12/13/16 22:01  12/13/16 22:01  12/13/16 22:01  12/13/16 22:01











- Medications


Medications: 


 Current Medications





Aspirin (Ecotrin)  81 mg PO DAILY [facility]


   Last Admin: 12/13/16 08:33 Dose:  81 mg


Atorvastatin Calcium (Lipitor)  20 mg PO HS [facility]


   Last Admin: 12/13/16 21:56 Dose:  20 mg


Divalproex Sodium (Depakote Dr(*Bid*))  500 mg PO BID [facility]


   Last Admin: 12/13/16 17:36 Dose:  500 mg


Enalapril Maleate (Vasotec)  10 mg PO DAILY [facility]


   Last Admin: 12/13/16 08:33 Dose:  10 mg


Famotidine (Pepcid)  20 mg PO BID [facility]


   Last Admin: 12/13/16 08:33 Dose:  20 mg


Ibuprofen (Motrin Tab)  600 mg PO Q8 PRN


   PRN Reason: Pain, moderate (4-7)


   Last Admin: 10/31/16 08:42 Dose:  600 mg


Metoprolol Tartrate (Lopressor)  50 mg PO Q12 [facility]


   Last Admin: 12/13/16 21:56 Dose:  50 mg


Quetiapine Fumarate (Seroquel)  25 mg PO HS [facility]


   Last Admin: 12/13/16 21:57 Dose:  25 mg











- Labs


Labs: 


 





 11/29/16 05:30 





 11/29/16 05:30 





 











PT  11.2 SECONDS (9.4-12.0)   12/14/15  05:20    


 


INR  1.05  (0.89-1.20)   12/14/15  05:20    


 


APTT  36.2 SECONDS (23.1-32.0)  H  12/14/15  05:20    














- Constitutional


Appears: Well, Non-toxic, No Acute Distress





- Head Exam


Head Exam: ATRAUMATIC, NORMAL INSPECTION, NORMOCEPHALIC





- Eye Exam


Eye Exam: EOMI, Normal appearance, PERRL


Pupil Exam: NORMAL ACCOMODATION, PERRL





- ENT Exam


ENT Exam: Mucous Membranes Moist, Normal Exam





- Neck Exam


Neck Exam: Full ROM, Normal Inspection.  absent: Lymphadenopathy





- Respiratory Exam


Respiratory Exam: Clear to Ausculation Bilateral, NORMAL BREATHING PATTERN





- Cardiovascular Exam


Cardiovascular Exam: REGULAR RHYTHM, RRR, +S1, +S2.  absent: Murmur





- GI/Abdominal Exam


GI & Abdominal Exam: Soft, Normal Bowel Sounds.  absent: Tenderness





-  Exam


Bimanual exam: NORMAL BIMANUAL EXAM





- Extremities Exam


Extremities Exam: Full ROM, Normal Capillary Refill, Normal Inspection.  absent

: Joint Swelling, Pedal Edema





- Back Exam


Back Exam: NORMAL INSPECTION





- Neurological Exam


Neurological Exam: Alert, Awake, CN II-XII Intact, Normal Gait, Oriented x3





- Psychiatric Exam


Psychiatric exam: Normal Affect, Normal Mood





- Skin


Skin Exam: Dry, Intact, Normal Color, Warm





Assessment and Plan


(1) DVT prophylaxis


Status: Chronic





(2) CAD (coronary artery disease)


Status: Chronic





(3) Smoking


Status: Chronic





(4) Low back pain


Status: Chronic





(5) Scrotal edema


Status: Chronic





(6) Prediabetes


Status: Chronic





(7) Neurocognitive disorder


Status: Chronic





(8) Leg edema, right


Status: Acute








- Assessment and Plan (Free Text)


Assessment: 


(1) DVT prophylaxis


Assessment & Plan: 


scd and ae hose


ambulation


Status: Chronic





(2) CAD (coronary artery disease)


Assessment & Plan: 


cont meds


Status: Chronic





(3) Smoking


Assessment & Plan: 


nicoderm


Status: Chronic





(4) Low back pain


Assessment & Plan: 


ibuprofen prn


Status: Chronic





(5) Scrotal edema


Status: Chronic





(6) Prediabetes


Assessment & Plan: 


dietary


Status: Chronic





(7) Neurocognitive disorder


Assessment & Plan: 


cont med


spendign placement


Status: Chronic

## 2016-12-15 RX ADMIN — DIVALPROEX SODIUM SCH MG: 250 TABLET, DELAYED RELEASE ORAL at 16:45

## 2016-12-15 RX ADMIN — DIVALPROEX SODIUM SCH MG: 250 TABLET, DELAYED RELEASE ORAL at 09:35

## 2016-12-15 NOTE — CP.PCM.PN
Subjective





- Date & Time of Evaluation


Date of Evaluation: 12/15/16


Time of Evaluation: 07:11





- Subjective


Subjective: 


no complaints/idstress


no f/c, n/v/d


pending placement





Objective





- Vital Signs/Intake and Output


Vital Signs (last 24 hours): 


 











Temp Pulse Resp BP Pulse Ox


 


 97.9 F   71   20   103/58 L  97 


 


 12/14/16 22:19  12/14/16 22:19  12/14/16 22:19  12/14/16 22:19  12/14/16 22:19











- Medications


Medications: 


 Current Medications





Aspirin (Ecotrin)  81 mg PO DAILY [facility]


   Last Admin: 12/14/16 08:31 Dose:  81 mg


Atorvastatin Calcium (Lipitor)  20 mg PO HS [facility]


   Last Admin: 12/14/16 21:45 Dose:  20 mg


Divalproex Sodium (Depakote Dr(*Bid*))  500 mg PO BID [facility]


   Last Admin: 12/14/16 17:07 Dose:  500 mg


Enalapril Maleate (Vasotec)  10 mg PO DAILY [facility]


   Last Admin: 12/14/16 17:08 Dose:  10 mg


Famotidine (Pepcid)  20 mg PO BID [facility]


   Last Admin: 12/14/16 17:07 Dose:  20 mg


Ibuprofen (Motrin Tab)  600 mg PO Q8 PRN


   PRN Reason: Pain, moderate (4-7)


   Last Admin: 10/31/16 08:42 Dose:  600 mg


Metoprolol Tartrate (Lopressor)  50 mg PO Q12 [facility]


   Last Admin: 12/14/16 21:44 Dose:  Not Given


Quetiapine Fumarate (Seroquel)  25 mg PO HS [facility]


   Last Admin: 12/14/16 21:45 Dose:  25 mg











- Labs


Labs: 


 





 11/29/16 05:30 





 11/29/16 05:30 





 











PT  11.2 SECONDS (9.4-12.0)   12/14/15  05:20    


 


INR  1.05  (0.89-1.20)   12/14/15  05:20    


 


APTT  36.2 SECONDS (23.1-32.0)  H  12/14/15  05:20    














- Constitutional


Appears: Well, Non-toxic, No Acute Distress





- Head Exam


Head Exam: ATRAUMATIC, NORMAL INSPECTION, NORMOCEPHALIC





- Eye Exam


Eye Exam: EOMI, Normal appearance, PERRL


Pupil Exam: NORMAL ACCOMODATION, PERRL





- ENT Exam


ENT Exam: Mucous Membranes Moist, Normal Exam





- Neck Exam


Neck Exam: Full ROM, Normal Inspection.  absent: Lymphadenopathy





- Respiratory Exam


Respiratory Exam: Clear to Ausculation Bilateral, NORMAL BREATHING PATTERN





- Cardiovascular Exam


Cardiovascular Exam: REGULAR RHYTHM, RRR, +S1, +S2.  absent: Murmur





- GI/Abdominal Exam


GI & Abdominal Exam: Soft, Normal Bowel Sounds.  absent: Tenderness





- Extremities Exam


Extremities Exam: Full ROM, Normal Capillary Refill, Normal Inspection.  absent

: Joint Swelling, Pedal Edema





- Back Exam


Back Exam: NORMAL INSPECTION





- Neurological Exam


Neurological Exam: Alert, Awake, CN II-XII Intact, Normal Gait, Oriented x3





- Psychiatric Exam


Psychiatric exam: Normal Affect, Normal Mood





- Skin


Skin Exam: Dry, Intact, Normal Color, Warm





Assessment and Plan


(1) DVT prophylaxis


Status: Chronic





(2) CAD (coronary artery disease)


Status: Chronic





(3) Smoking


Status: Chronic





(4) Low back pain


Status: Chronic





(5) Scrotal edema


Status: Chronic





(6) Prediabetes


Status: Chronic





(7) Neurocognitive disorder


Status: Chronic





(8) Leg edema, right


Status: Acute








- Assessment and Plan (Free Text)


Assessment: 


(1) DVT prophylaxis


Assessment & Plan: 


scd and ae hose


ambulation


Status: Chronic





(2) CAD (coronary artery disease)


Assessment & Plan: 


cont meds


Status: Chronic





(3) Smoking


Assessment & Plan: 


nicoderm


Status: Chronic





(4) Low back pain


Assessment & Plan: 


ibuprofen prn


Status: Chronic





(5) Scrotal edema


Status: Chronic





(6) Prediabetes


Assessment & Plan: 


dietary


Status: Chronic





(7) Neurocognitive disorder


Assessment & Plan: 


cont med


spendign placement


Status: Chronic

## 2016-12-16 RX ADMIN — DIVALPROEX SODIUM SCH MG: 250 TABLET, DELAYED RELEASE ORAL at 16:13

## 2016-12-16 RX ADMIN — DIVALPROEX SODIUM SCH MG: 250 TABLET, DELAYED RELEASE ORAL at 08:59

## 2016-12-16 NOTE — CP.PCM.PN
Subjective





- Date & Time of Evaluation


Date of Evaluation: 12/16/16


Time of Evaluation: 09:49





- Subjective


Subjective: 


no complaints/distress


no f/c, n/v/d


pending placement





Objective





- Vital Signs/Intake and Output


Vital Signs (last 24 hours): 


 











Temp Pulse Resp BP Pulse Ox


 


 96.3 F L  71   18   115/69   97 


 


 12/16/16 07:38  12/16/16 08:59  12/16/16 07:38  12/16/16 08:59  12/16/16 07:38











- Medications


Medications: 


 Current Medications





Aspirin (Ecotrin)  81 mg PO DAILY [facility]


   Last Admin: 12/16/16 08:59 Dose:  81 mg


Atorvastatin Calcium (Lipitor)  20 mg PO HS [facility]


   Last Admin: 12/15/16 21:21 Dose:  20 mg


Divalproex Sodium (Depakote Dr(*Bid*))  500 mg PO BID [facility]


   Last Admin: 12/16/16 08:59 Dose:  500 mg


Enalapril Maleate (Vasotec)  10 mg PO DAILY [facility]


   Last Admin: 12/16/16 08:59 Dose:  10 mg


Famotidine (Pepcid)  20 mg PO BID [facility]


   Last Admin: 12/16/16 08:59 Dose:  20 mg


Ibuprofen (Motrin Tab)  600 mg PO Q8 PRN


   PRN Reason: Pain, moderate (4-7)


   Last Admin: 10/31/16 08:42 Dose:  600 mg


Metoprolol Tartrate (Lopressor)  50 mg PO Q12 [facility]


   Last Admin: 12/16/16 08:59 Dose:  50 mg


Quetiapine Fumarate (Seroquel)  25 mg PO HS [facility]


   Last Admin: 12/15/16 21:21 Dose:  25 mg











- Labs


Labs: 


 





 11/29/16 05:30 





 11/29/16 05:30 





 











PT  11.2 SECONDS (9.4-12.0)   12/14/15  05:20    


 


INR  1.05  (0.89-1.20)   12/14/15  05:20    


 


APTT  36.2 SECONDS (23.1-32.0)  H  12/14/15  05:20    














- Constitutional


Appears: Well, Non-toxic, No Acute Distress





- Head Exam


Head Exam: ATRAUMATIC, NORMAL INSPECTION, NORMOCEPHALIC





- Eye Exam


Eye Exam: EOMI, Normal appearance, PERRL


Pupil Exam: NORMAL ACCOMODATION, PERRL





- ENT Exam


ENT Exam: Mucous Membranes Moist, Normal Exam





- Neck Exam


Neck Exam: Full ROM, Normal Inspection.  absent: Lymphadenopathy





- Respiratory Exam


Respiratory Exam: Clear to Ausculation Bilateral, NORMAL BREATHING PATTERN





- Cardiovascular Exam


Cardiovascular Exam: REGULAR RHYTHM, RRR, +S1, +S2.  absent: Murmur





- GI/Abdominal Exam


GI & Abdominal Exam: Soft, Normal Bowel Sounds.  absent: Tenderness





- Extremities Exam


Extremities Exam: Full ROM, Normal Capillary Refill, Normal Inspection.  absent

: Joint Swelling, Pedal Edema





- Back Exam


Back Exam: NORMAL INSPECTION





- Neurological Exam


Neurological Exam: Alert, Awake, CN II-XII Intact, Normal Gait, Oriented x3





- Psychiatric Exam


Psychiatric exam: Normal Affect, Normal Mood





- Skin


Skin Exam: Dry, Intact, Normal Color, Warm





Assessment and Plan


(1) DVT prophylaxis


Status: Chronic





(2) CAD (coronary artery disease)


Status: Chronic





(3) Smoking


Status: Chronic





(4) Low back pain


Status: Chronic





(5) Scrotal edema


Status: Chronic





(6) Prediabetes


Status: Chronic





(7) Neurocognitive disorder


Status: Chronic





(8) Leg edema, right


Status: Acute








- Assessment and Plan (Free Text)


Assessment: 


(1) DVT prophylaxis


Assessment & Plan: 


scd and ae hose


ambulation


Status: Chronic





(2) CAD (coronary artery disease)


Assessment & Plan: 


cont meds


Status: Chronic





(3) Smoking


Assessment & Plan: 


nicoderm


Status: Chronic





(4) Low back pain


Assessment & Plan: 


ibuprofen prn


Status: Chronic





(5) Scrotal edema


Status: Chronic





(6) Prediabetes


Assessment & Plan: 


dietary


Status: Chronic





(7) Neurocognitive disorder


Assessment & Plan: 


cont med


spendign placement


Status: Chronic

## 2016-12-17 RX ADMIN — DIVALPROEX SODIUM SCH MG: 250 TABLET, DELAYED RELEASE ORAL at 09:10

## 2016-12-17 RX ADMIN — DIVALPROEX SODIUM SCH MG: 250 TABLET, DELAYED RELEASE ORAL at 16:49

## 2016-12-17 NOTE — CP.PCM.PN
Subjective





- Date & Time of Evaluation


Date of Evaluation: 12/17/16


Time of Evaluation: 10:24





- Subjective


Subjective: 


dictated note


no f/c, n/v/d


pending placement





Objective





- Vital Signs/Intake and Output


Vital Signs (last 24 hours): 


 











Temp Pulse Resp BP Pulse Ox


 


 98.2 F   69   20   110/69   98 


 


 12/17/16 07:56  12/17/16 09:10  12/17/16 07:56  12/17/16 09:10  12/17/16 07:56











- Medications


Medications: 


 Current Medications





Aspirin (Ecotrin)  81 mg PO DAILY [facility]


   Last Admin: 12/17/16 09:10 Dose:  81 mg


Atorvastatin Calcium (Lipitor)  20 mg PO HS [facility]


   Last Admin: 12/16/16 21:02 Dose:  20 mg


Divalproex Sodium (Depakote Dr(*Bid*))  500 mg PO BID [facility]


   Last Admin: 12/17/16 09:10 Dose:  500 mg


Enalapril Maleate (Vasotec)  10 mg PO DAILY [facility]


   Last Admin: 12/17/16 09:10 Dose:  10 mg


Famotidine (Pepcid)  20 mg PO BID [facility]


   Last Admin: 12/17/16 09:10 Dose:  20 mg


Ibuprofen (Motrin Tab)  600 mg PO Q8 PRN


   PRN Reason: Pain, moderate (4-7)


   Last Admin: 10/31/16 08:42 Dose:  600 mg


Metoprolol Tartrate (Lopressor)  50 mg PO Q12 [facility]


   Last Admin: 12/17/16 09:10 Dose:  50 mg


Quetiapine Fumarate (Seroquel)  25 mg PO HS [facility]


   Last Admin: 12/16/16 21:02 Dose:  25 mg











- Labs


Labs: 


 





 11/29/16 05:30 





 11/29/16 05:30 





 











PT  11.2 SECONDS (9.4-12.0)   12/14/15  05:20    


 


INR  1.05  (0.89-1.20)   12/14/15  05:20    


 


APTT  36.2 SECONDS (23.1-32.0)  H  12/14/15  05:20    














Assessment and Plan


(1) DVT prophylaxis


Status: Chronic





(2) CAD (coronary artery disease)


Status: Chronic





(3) Smoking


Status: Chronic





(4) Low back pain


Status: Chronic





(5) Scrotal edema


Status: Chronic





(6) Prediabetes


Status: Chronic





(7) Neurocognitive disorder


Status: Chronic





(8) Leg edema, right


Status: Acute

## 2016-12-17 NOTE — PN
DATE: 12/17/2016



SUBJECTIVE:  The patient evaluated in bed.  No complaints, no distress.  No 
fever, chills, nausea, vomiting, diarrhea.  Pending placement.



REVIEW OF SYSTEMS:  Negative.



PHYSICAL EXAMINATION:

GENERAL:  Alert and oriented, his baseline.

HEART:  Regular rate rhythm without rubs or gallops.

LUNGS:  Clear fields bilaterally.

ABDOMEN:  Soft, nontender, bowel sounds x 4.

EXTREMITIES:  Distal pulses, motor sensation intact.  Cap refill is brisk.  



ASSESSMENT AND PLAN:

1.    Coronary artery disease and neurocognitive disorder for placement, status 
post cardiac arrest.  Continue all medicationspending placement

2, DVT prophylaxis, AE hose, ambulation.  

3.  Intermittent chronic pain, ibuprofen p.r.n. 



Will continue the patient until placement.





__________________________________________

Fan SILVER







cc:   



DD: 12/17/2016 12:30:25  [id number]

TT: 12/17/2016 13:16:21

Confirmation # [id number]

Dictation # [id number]

jn

MTDD

## 2016-12-18 RX ADMIN — DIVALPROEX SODIUM SCH MG: 250 TABLET, DELAYED RELEASE ORAL at 09:09

## 2016-12-18 NOTE — CP.PCM.PN
Subjective





- Date & Time of Evaluation


Date of Evaluation: 12/18/16


Time of Evaluation: 13:17





- Subjective


Subjective: 


no f/c/n/v/d


pending placement


no copmalints/distress





Objective





- Vital Signs/Intake and Output


Vital Signs (last 24 hours): 


 











Temp Pulse Resp BP Pulse Ox


 


 97.4 F L  67   20   94/60 L  97 


 


 12/18/16 08:14  12/18/16 09:09  12/18/16 08:14  12/18/16 09:09  12/18/16 08:14











- Medications


Medications: 


 Current Medications





Aspirin (Ecotrin)  81 mg PO DAILY [facility]


   Last Admin: 12/18/16 09:08 Dose:  81 mg


Atorvastatin Calcium (Lipitor)  20 mg PO HS [facility]


   Last Admin: 12/17/16 21:03 Dose:  20 mg


Divalproex Sodium (Depakote Dr(*Bid*))  500 mg PO BID [facility]


   Last Admin: 12/18/16 09:09 Dose:  500 mg


Enalapril Maleate (Vasotec)  10 mg PO DAILY [facility]


   Last Admin: 12/18/16 09:08 Dose:  10 mg


Famotidine (Pepcid)  20 mg PO BID [facility]


   Last Admin: 12/18/16 09:08 Dose:  20 mg


Ibuprofen (Motrin Tab)  600 mg PO Q8 PRN


   PRN Reason: Pain, moderate (4-7)


   Last Admin: 10/31/16 08:42 Dose:  600 mg


Metoprolol Tartrate (Lopressor)  50 mg PO Q12 [facility]


   Last Admin: 12/18/16 09:09 Dose:  Not Given


Quetiapine Fumarate (Seroquel)  25 mg PO [facility]


   Last Admin: 12/17/16 21:03 Dose:  25 mg











- Labs


Labs: 


 





 11/29/16 05:30 





 11/29/16 05:30 





 











PT  11.2 SECONDS (9.4-12.0)   12/14/15  05:20    


 


INR  1.05  (0.89-1.20)   12/14/15  05:20    


 


APTT  36.2 SECONDS (23.1-32.0)  H  12/14/15  05:20    














- Constitutional


Appears: Well, Non-toxic, No Acute Distress





- Head Exam


Head Exam: ATRAUMATIC, NORMAL INSPECTION, NORMOCEPHALIC





- Eye Exam


Eye Exam: EOMI, Normal appearance, PERRL


Pupil Exam: NORMAL ACCOMODATION, PERRL





- ENT Exam


ENT Exam: Mucous Membranes Moist, Normal Exam





- Neck Exam


Neck Exam: Full ROM, Normal Inspection.  absent: Lymphadenopathy





- Respiratory Exam


Respiratory Exam: Clear to Ausculation Bilateral, NORMAL BREATHING PATTERN





- Cardiovascular Exam


Cardiovascular Exam: REGULAR RHYTHM, RRR, +S1, +S2.  absent: Murmur





- GI/Abdominal Exam


GI & Abdominal Exam: Soft, Normal Bowel Sounds.  absent: Tenderness





- Extremities Exam


Extremities Exam: Full ROM, Normal Capillary Refill, Normal Inspection.  absent

: Joint Swelling, Pedal Edema





- Back Exam


Back Exam: NORMAL INSPECTION





- Neurological Exam


Neurological Exam: Alert, Awake, CN II-XII Intact, Normal Gait, Oriented x3





- Psychiatric Exam


Psychiatric exam: Normal Affect, Normal Mood





- Skin


Skin Exam: Dry, Intact, Normal Color, Warm





Assessment and Plan


(1) DVT prophylaxis


Status: Chronic





(2) CAD (coronary artery disease)


Status: Chronic





(3) Smoking


Status: Chronic





(4) Low back pain


Status: Chronic





(5) Scrotal edema


Status: Chronic





(6) Prediabetes


Status: Chronic





(7) Neurocognitive disorder


Status: Chronic





(8) Leg edema, right


Status: Acute








- Assessment and Plan (Free Text)


Assessment: 


(1) DVT prophylaxis


Assessment & Plan: 


scd and ae hose


ambulation


Status: Chronic





(2) CAD (coronary artery disease)


Assessment & Plan: 


cont meds


Status: Chronic





(3) Smoking


Assessment & Plan: 


nicoderm


Status: Chronic





(4) Low back pain


Assessment & Plan: 


ibuprofen prn


Status: Chronic





(5) Scrotal edema


Status: Chronic





(6) Prediabetes


Assessment & Plan: 


dietary


Status: Chronic





(7) Neurocognitive disorder


Assessment & Plan: 


cont med


spendign placement


Status: Chroni

## 2016-12-19 RX ADMIN — DIVALPROEX SODIUM SCH MG: 500 TABLET, DELAYED RELEASE ORAL at 08:30

## 2016-12-19 RX ADMIN — DIVALPROEX SODIUM SCH MG: 500 TABLET, DELAYED RELEASE ORAL at 17:42

## 2016-12-19 NOTE — CP.PCM.PN
Subjective





- Date & Time of Evaluation


Date of Evaluation: 12/19/16


Time of Evaluation: 07:21





- Subjective


Subjective: 


no f/c, n/v/d


no complaints/distress


pending placement





Objective





- Vital Signs/Intake and Output


Vital Signs (last 24 hours): 


 











Temp Pulse Resp BP Pulse Ox


 


 98.1 F   80   20   100/64   97 


 


 12/18/16 22:28  12/18/16 22:28  12/18/16 22:28  12/18/16 22:28  12/18/16 22:28











- Medications


Medications: 


 Current Medications





Aspirin (Ecotrin)  81 mg PO DAILY [facility]


   Last Admin: 12/18/16 09:08 Dose:  81 mg


Atorvastatin Calcium (Lipitor)  20 mg PO HS [facility]


   Last Admin: 12/18/16 21:33 Dose:  20 mg


Divalproex Sodium (Depakote Dr(*Bid*))  500 mg PO BID [facility]


Enalapril Maleate (Vasotec)  10 mg PO DAILY [facility]


   Last Admin: 12/18/16 09:08 Dose:  10 mg


Famotidine (Pepcid)  20 mg PO BID [facility]


   Last Admin: 12/18/16 17:22 Dose:  20 mg


Ibuprofen (Motrin Tab)  600 mg PO Q8 PRN


   PRN Reason: Pain, moderate (4-7)


   Last Admin: 10/31/16 08:42 Dose:  600 mg


Metoprolol Tartrate (Lopressor)  50 mg PO Q12 [facility]


   Last Admin: 12/18/16 21:33 Dose:  50 mg


Quetiapine Fumarate (Seroquel)  25 mg PO HS [facility]


   Last Admin: 12/18/16 21:34 Dose:  25 mg











- Labs


Labs: 


 





 11/29/16 05:30 





 11/29/16 05:30 





 











PT  11.2 SECONDS (9.4-12.0)   12/14/15  05:20    


 


INR  1.05  (0.89-1.20)   12/14/15  05:20    


 


APTT  36.2 SECONDS (23.1-32.0)  H  12/14/15  05:20    














- Constitutional


Appears: Well, Non-toxic, No Acute Distress





- Head Exam


Head Exam: ATRAUMATIC, NORMAL INSPECTION, NORMOCEPHALIC





- Eye Exam


Eye Exam: EOMI, Normal appearance, PERRL


Pupil Exam: NORMAL ACCOMODATION, PERRL





- ENT Exam


ENT Exam: Mucous Membranes Moist, Normal Exam





- Neck Exam


Neck Exam: Full ROM, Normal Inspection.  absent: Lymphadenopathy





- Respiratory Exam


Respiratory Exam: Clear to Ausculation Bilateral, NORMAL BREATHING PATTERN





- Cardiovascular Exam


Cardiovascular Exam: REGULAR RHYTHM, RRR, +S1, +S2.  absent: Murmur





- GI/Abdominal Exam


GI & Abdominal Exam: Soft, Normal Bowel Sounds.  absent: Tenderness





- Extremities Exam


Extremities Exam: Full ROM, Normal Capillary Refill, Normal Inspection.  absent

: Joint Swelling, Pedal Edema





- Back Exam


Back Exam: NORMAL INSPECTION





- Neurological Exam


Neurological Exam: Alert, Awake, CN II-XII Intact, Normal Gait, Oriented x3





- Psychiatric Exam


Psychiatric exam: Normal Affect, Normal Mood





- Skin


Skin Exam: Dry, Intact, Normal Color, Warm





Assessment and Plan


(1) DVT prophylaxis


Status: Chronic





(2) CAD (coronary artery disease)


Status: Chronic





(3) Smoking


Status: Chronic





(4) Low back pain


Status: Chronic





(5) Scrotal edema


Status: Chronic





(6) Prediabetes


Status: Chronic





(7) Neurocognitive disorder


Status: Chronic





(8) Leg edema, right


Status: Acute








- Assessment and Plan (Free Text)


Assessment: 


(1) DVT prophylaxis


Assessment & Plan: 


scd and ae hose


ambulation


Status: Chronic





(2) CAD (coronary artery disease)


Assessment & Plan: 


cont meds


Status: Chronic





(3) Smoking


Assessment & Plan: 


nicoderm


Status: Chronic





(4) Low back pain


Assessment & Plan: 


ibuprofen prn


Status: Chronic





(5) Scrotal edema


Status: Chronic





(6) Prediabetes


Assessment & Plan: 


dietary


Status: Chronic





(7) Neurocognitive disorder


Assessment & Plan: 


cont med


spendign placement


Status: Chroni

## 2016-12-20 RX ADMIN — DIVALPROEX SODIUM SCH MG: 500 TABLET, DELAYED RELEASE ORAL at 17:07

## 2016-12-20 RX ADMIN — DIVALPROEX SODIUM SCH MG: 500 TABLET, DELAYED RELEASE ORAL at 08:30

## 2016-12-20 NOTE — CP.PCM.PN
Subjective





- Date & Time of Evaluation


Date of Evaluation: 12/20/16


Time of Evaluation: 07:14





- Subjective


Subjective: 


no f/c, n/v/d


pending placement


no complaints/distress





Objective





- Vital Signs/Intake and Output


Vital Signs (last 24 hours): 


 











Temp Pulse Resp BP Pulse Ox


 


 98.4 F   84   20   124/78   98 


 


 12/19/16 22:20  12/19/16 22:20  12/19/16 22:20  12/19/16 22:20  12/19/16 22:20











- Medications


Medications: 


 Current Medications





Aspirin (Ecotrin)  81 mg PO DAILY [facility]


   Last Admin: 12/19/16 08:31 Dose:  81 mg


Atorvastatin Calcium (Lipitor)  20 mg PO HS [facility]


   Last Admin: 12/19/16 20:59 Dose:  20 mg


Divalproex Sodium (Depakote Dr(*Bid*))  500 mg PO BID [facility]


   Last Admin: 12/19/16 17:42 Dose:  500 mg


Enalapril Maleate (Vasotec)  10 mg PO DAILY [facility]


   Last Admin: 12/19/16 08:30 Dose:  10 mg


Famotidine (Pepcid)  20 mg PO BID [facility]


   Last Admin: 12/19/16 17:31 Dose:  20 mg


Ibuprofen (Motrin Tab)  600 mg PO Q8 PRN


   PRN Reason: Pain, moderate (4-7)


   Last Admin: 10/31/16 08:42 Dose:  600 mg


Metoprolol Tartrate (Lopressor)  50 mg PO Q12 [facility]


   Last Admin: 12/19/16 20:58 Dose:  50 mg


Quetiapine Fumarate (Seroquel)  25 mg PO HS [facility]


   Last Admin: 12/19/16 20:59 Dose:  25 mg











- Labs


Labs: 


 





 11/29/16 05:30 





 11/29/16 05:30 





 











PT  11.2 SECONDS (9.4-12.0)   12/14/15  05:20    


 


INR  1.05  (0.89-1.20)   12/14/15  05:20    


 


APTT  36.2 SECONDS (23.1-32.0)  H  12/14/15  05:20    














- Constitutional


Appears: Well, Non-toxic, No Acute Distress





- Head Exam


Head Exam: ATRAUMATIC, NORMAL INSPECTION, NORMOCEPHALIC





- Eye Exam


Eye Exam: EOMI, Normal appearance, PERRL


Pupil Exam: NORMAL ACCOMODATION, PERRL





- ENT Exam


ENT Exam: Mucous Membranes Moist, Normal Exam





- Neck Exam


Neck Exam: Full ROM, Normal Inspection.  absent: Lymphadenopathy





- Respiratory Exam


Respiratory Exam: Clear to Ausculation Bilateral, NORMAL BREATHING PATTERN





- Cardiovascular Exam


Cardiovascular Exam: REGULAR RHYTHM, RRR, +S1, +S2.  absent: Murmur





- GI/Abdominal Exam


GI & Abdominal Exam: Soft, Normal Bowel Sounds.  absent: Tenderness





- Extremities Exam


Extremities Exam: Full ROM, Normal Capillary Refill, Normal Inspection.  absent

: Joint Swelling, Pedal Edema





- Back Exam


Back Exam: NORMAL INSPECTION





- Neurological Exam


Neurological Exam: Alert, Awake, CN II-XII Intact, Normal Gait, Oriented x3





- Psychiatric Exam


Psychiatric exam: Normal Affect, Normal Mood





- Skin


Skin Exam: Dry, Intact, Normal Color, Warm





Assessment and Plan


(1) DVT prophylaxis


Status: Chronic





(2) CAD (coronary artery disease)


Status: Chronic





(3) Smoking


Status: Chronic





(4) Low back pain


Status: Chronic





(5) Scrotal edema


Status: Chronic





(6) Prediabetes


Status: Chronic





(7) Neurocognitive disorder


Status: Chronic





(8) Leg edema, right


Status: Acute








- Assessment and Plan (Free Text)


Assessment: 


(1) DVT prophylaxis


Assessment & Plan: 


scd and ae hose


ambulation


Status: Chronic





(2) CAD (coronary artery disease)


Assessment & Plan: 


cont meds


Status: Chronic





(3) Smoking


Assessment & Plan: 


nicoderm


Status: Chronic





(4) Low back pain


Assessment & Plan: 


ibuprofen prn


Status: Chronic





(5) Scrotal edema


Status: Chronic





(6) Prediabetes


Assessment & Plan: 


dietary


Status: Chronic





(7) Neurocognitive disorder


Assessment & Plan: 


cont med


spendign placement


Status: Chronic

## 2016-12-21 LAB
ALBUMIN SERPL-MCNC: 4.1 G/DL (ref 3.5–5)
ALBUMIN/GLOB SERPL: 1.1 {RATIO} (ref 1–2.1)
ALT SERPL-CCNC: 36 U/L (ref 21–72)
AST SERPL-CCNC: 20 U/L (ref 17–59)
BASOPHILS # BLD AUTO: 0 K/UL (ref 0–0.2)
BASOPHILS NFR BLD: 0.3 % (ref 0–2)
BUN SERPL-MCNC: 21 MG/DL (ref 9–20)
CALCIUM SERPL-MCNC: 9.6 MG/DL (ref 8.4–10.2)
EOSINOPHIL # BLD AUTO: 0.4 K/UL (ref 0–0.7)
EOSINOPHIL NFR BLD: 4.9 % (ref 0–4)
ERYTHROCYTE [DISTWIDTH] IN BLOOD BY AUTOMATED COUNT: 13.4 % (ref 11.5–14.5)
GFR NON-AFRICAN AMERICAN: > 60
HGB BLD-MCNC: 13.2 G/DL (ref 12–18)
LYMPHOCYTES # BLD AUTO: 2.2 K/UL (ref 1–4.3)
LYMPHOCYTES NFR BLD AUTO: 25.3 % (ref 20–40)
MCH RBC QN AUTO: 30.5 PG (ref 27–31)
MCHC RBC AUTO-ENTMCNC: 33 G/DL (ref 33–37)
MCV RBC AUTO: 92.4 FL (ref 80–94)
MONOCYTES # BLD: 1.6 K/UL (ref 0–0.8)
MONOCYTES NFR BLD: 17.7 % (ref 0–10)
NEUTROPHILS # BLD: 4.6 K/UL (ref 1.8–7)
NEUTROPHILS NFR BLD AUTO: 51.8 % (ref 50–75)
NRBC BLD AUTO-RTO: 0.1 % (ref 0–0)
PLATELET # BLD: 195 K/UL (ref 130–400)
PMV BLD AUTO: 6.9 FL (ref 7.2–11.7)
RBC # BLD AUTO: 4.32 MIL/UL (ref 4.4–5.9)
WBC # BLD AUTO: 8.9 K/UL (ref 4.8–10.8)

## 2016-12-21 RX ADMIN — DIVALPROEX SODIUM SCH MG: 500 TABLET, DELAYED RELEASE ORAL at 10:21

## 2016-12-21 RX ADMIN — PROMETHAZINE HYDROCHLORIDE AND DEXTROMETHORPHAN HYDROBROMIDE PRN ML: 15; 6.25 SYRUP ORAL at 11:51

## 2016-12-21 RX ADMIN — DIVALPROEX SODIUM SCH MG: 500 TABLET, DELAYED RELEASE ORAL at 16:09

## 2016-12-21 NOTE — CP.PCM.PN
Subjective





- Date & Time of Evaluation


Date of Evaluation: 12/21/16


Time of Evaluation: 07:48





- Subjective


Subjective: 


pt offers no complaints, had 1x fever last night, + cough, cxr negative, no wbc 

count.  c/s obtained.


pending placement





Objective





- Vital Signs/Intake and Output


Vital Signs (last 24 hours): 


 











Temp Pulse Resp BP Pulse Ox


 


 99.1 F   100 H  20   109/66   97 


 


 12/21/16 00:04  12/20/16 23:05  12/20/16 23:05  12/20/16 23:05  12/20/16 23:05











- Medications


Medications: 


 Current Medications





Acetaminophen (Tylenol 325mg Tab)  650 mg PO Q6 PRN


   PRN Reason: Fever >100.4 F


   Last Admin: 12/20/16 23:04 Dose:  650 mg


Aspirin (Ecotrin)  81 mg PO DAILY [facility]


   Last Admin: 12/20/16 08:30 Dose:  81 mg


Atorvastatin Calcium (Lipitor)  20 mg PO HS [facility]


   Last Admin: 12/20/16 21:16 Dose:  20 mg


Divalproex Sodium (Depakote Dr(*Bid*))  500 mg PO BID [facility]


   Last Admin: 12/20/16 17:07 Dose:  500 mg


Enalapril Maleate (Vasotec)  10 mg PO DAILY [facility]


   Last Admin: 12/20/16 08:30 Dose:  10 mg


Famotidine (Pepcid)  20 mg PO BID [facility]


   Last Admin: 12/20/16 17:07 Dose:  20 mg


Ibuprofen (Motrin Tab)  600 mg PO Q8 PRN


   PRN Reason: Pain, moderate (4-7)


   Last Admin: 10/31/16 08:42 Dose:  600 mg


Metoprolol Tartrate (Lopressor)  50 mg PO Q12 [facility]


   Last Admin: 12/20/16 21:16 Dose:  50 mg


Quetiapine Fumarate (Seroquel)  25 mg PO HS [facility]


   Last Admin: 12/20/16 21:16 Dose:  25 mg











- Labs


Labs: 


 





 11/29/16 05:30 





 11/29/16 05:30 





 











PT  11.2 SECONDS (9.4-12.0)   12/14/15  05:20    


 


INR  1.05  (0.89-1.20)   12/14/15  05:20    


 


APTT  36.2 SECONDS (23.1-32.0)  H  12/14/15  05:20    














- Constitutional


Appears: Well, Non-toxic, No Acute Distress





- Head Exam


Head Exam: ATRAUMATIC, NORMAL INSPECTION, NORMOCEPHALIC





- Eye Exam


Eye Exam: EOMI, Normal appearance, PERRL


Pupil Exam: NORMAL ACCOMODATION, PERRL





- ENT Exam


ENT Exam: Mucous Membranes Moist, Normal Exam





- Neck Exam


Neck Exam: Full ROM, Normal Inspection.  absent: Lymphadenopathy





- Respiratory Exam


Respiratory Exam: Clear to Ausculation Bilateral, NORMAL BREATHING PATTERN





- Cardiovascular Exam


Cardiovascular Exam: REGULAR RHYTHM, RRR, +S1, +S2.  absent: Murmur





- GI/Abdominal Exam


GI & Abdominal Exam: Soft, Normal Bowel Sounds.  absent: Tenderness





- Extremities Exam


Extremities Exam: Full ROM, Normal Capillary Refill, Normal Inspection.  absent

: Joint Swelling, Pedal Edema





- Back Exam


Back Exam: Full ROM, NORMAL INSPECTION





- Neurological Exam


Neurological Exam: Alert, Awake, CN II-XII Intact, Normal Gait, Oriented x3





- Psychiatric Exam


Psychiatric exam: Normal Affect, Normal Mood





- Skin


Skin Exam: Dry, Intact, Normal Color, Warm





Assessment and Plan


(1) DVT prophylaxis


Status: Chronic





(2) CAD (coronary artery disease)


Status: Chronic





(3) Smoking


Status: Chronic





(4) Low back pain


Status: Chronic





(5) Scrotal edema


Status: Chronic





(6) Prediabetes


Status: Chronic





(7) Neurocognitive disorder


Status: Chronic





(8) Leg edema, right


Status: Acute








- Assessment and Plan (Free Text)


Assessment: 


(1) DVT prophylaxis


Assessment & Plan: 


scd and ae hose


ambulation


Status: Chronic





(2) CAD (coronary artery disease)


Assessment & Plan: 


cont meds


Status: Chronic





(3) Smoking


Assessment & Plan: 


nicoderm


Status: Chronic





(4) Low back pain


Assessment & Plan: 


ibuprofen prn


Status: Chronic





(5) Scrotal edema


Status: Chronic





(6) Prediabetes


Assessment & Plan: 


dietary


Status: Chronic





(7) Neurocognitive disorder


Assessment & Plan: 


cont med


spendign placement


Status: Chronic








8-Fever/cough-cxr and bw negative, will monitor, treat cough, tylenol for 

fever.  further intervention as indicated

## 2016-12-21 NOTE — RAD
HISTORY:

New onset fever. Technique: Single view portable erect @ 23:10.



COMPARISON:

06/28/2016. 



FINDINGS:



LUNGS:

No active pulmonary disease.



PLEURA:

No significant pleural effusion identified, no pneumothorax apparent.



CARDIOVASCULAR:

Normal.



OSSEOUS STRUCTURES:

No significant abnormalities.



VISUALIZED UPPER ABDOMEN:

Normal.



OTHER FINDINGS:

None.



IMPRESSION:

No active disease. No significant interval change compared to the 

prior examination(s).

## 2016-12-22 RX ADMIN — DIVALPROEX SODIUM SCH MG: 500 TABLET, DELAYED RELEASE ORAL at 16:49

## 2016-12-22 RX ADMIN — PROMETHAZINE HYDROCHLORIDE AND DEXTROMETHORPHAN HYDROBROMIDE PRN ML: 15; 6.25 SYRUP ORAL at 08:54

## 2016-12-22 RX ADMIN — DIVALPROEX SODIUM SCH MG: 500 TABLET, DELAYED RELEASE ORAL at 08:50

## 2016-12-22 NOTE — CP.PCM.PN
Subjective





- Date & Time of Evaluation


Date of Evaluation: 12/22/16


Time of Evaluation: 07:16





- Subjective


Subjective: 


no complaints/distress


no f/c, n/v/d


pending placement


no further cough/congestion.


pending final c/s





Objective





- Vital Signs/Intake and Output


Vital Signs (last 24 hours): 


 











Temp Pulse Resp BP Pulse Ox


 


 97.5 F L  80   18   105/66   99 


 


 12/21/16 23:03  12/21/16 23:00  12/21/16 23:00  12/21/16 23:00  12/21/16 23:00











- Medications


Medications: 


 Current Medications





Acetaminophen (Tylenol 325mg Tab)  650 mg PO Q6 PRN


   PRN Reason: Fever >100.4 F


   Last Admin: 12/21/16 16:10 Dose:  650 mg


Aspirin (Ecotrin)  81 mg PO DAILY [facility]


   Last Admin: 12/21/16 10:22 Dose:  81 mg


Atorvastatin Calcium (Lipitor)  20 mg PO HS [facility]


   Last Admin: 12/21/16 21:37 Dose:  20 mg


Divalproex Sodium (Depakote Dr(*Bid*))  500 mg PO BID [facility]


   Last Admin: 12/21/16 16:09 Dose:  500 mg


Enalapril Maleate (Vasotec)  10 mg PO DAILY [facility]


   Last Admin: 12/21/16 10:22 Dose:  10 mg


Famotidine (Pepcid)  20 mg PO BID [facility]


   Last Admin: 12/21/16 16:10 Dose:  20 mg


Ibuprofen (Motrin Tab)  600 mg PO Q8 PRN


   PRN Reason: Pain, moderate (4-7)


   Last Admin: 12/21/16 11:51 Dose:  600 mg


Metoprolol Tartrate (Lopressor)  50 mg PO Q12 [facility]


   Last Admin: 12/21/16 21:37 Dose:  50 mg


Promethazine HCl/Dextromethorphan (Phenergan Dm Syrup)  5 ml PO Q6 PRN


   PRN Reason: Cough


   Last Admin: 12/21/16 11:51 Dose:  5 ml


Quetiapine Fumarate (Seroquel)  25 mg PO HS [facility]


   Last Admin: 12/21/16 21:37 Dose:  25 mg











- Labs


Labs: 


 





 12/21/16 08:02 





 12/21/16 08:02 





 











PT  11.2 SECONDS (9.4-12.0)   12/14/15  05:20    


 


INR  1.05  (0.89-1.20)   12/14/15  05:20    


 


APTT  36.2 SECONDS (23.1-32.0)  H  12/14/15  05:20    














- Constitutional


Appears: Well, Non-toxic, No Acute Distress





- Head Exam


Head Exam: ATRAUMATIC, NORMAL INSPECTION, NORMOCEPHALIC





- Eye Exam


Eye Exam: EOMI, Normal appearance, PERRL


Pupil Exam: NORMAL ACCOMODATION, PERRL





- ENT Exam


ENT Exam: Mucous Membranes Moist, Normal Exam





- Neck Exam


Neck Exam: Full ROM, Normal Inspection.  absent: Lymphadenopathy





- Respiratory Exam


Respiratory Exam: Clear to Ausculation Bilateral, NORMAL BREATHING PATTERN





- Cardiovascular Exam


Cardiovascular Exam: REGULAR RHYTHM, RRR, +S1, +S2.  absent: Murmur





- GI/Abdominal Exam


GI & Abdominal Exam: Soft, Normal Bowel Sounds.  absent: Tenderness





- Extremities Exam


Extremities Exam: Full ROM, Normal Capillary Refill, Normal Inspection.  absent

: Joint Swelling, Pedal Edema





- Back Exam


Back Exam: NORMAL INSPECTION





- Neurological Exam


Neurological Exam: Alert, Awake, CN II-XII Intact, Normal Gait, Oriented x3





- Psychiatric Exam


Psychiatric exam: Normal Affect, Normal Mood





- Skin


Skin Exam: Dry, Intact, Normal Color, Warm





Assessment and Plan


(1) DVT prophylaxis


Status: Chronic





(2) CAD (coronary artery disease)


Status: Chronic





(3) Smoking


Status: Chronic





(4) Low back pain


Status: Chronic





(5) Scrotal edema


Status: Chronic





(6) Prediabetes


Status: Chronic





(7) Neurocognitive disorder


Status: Chronic





(8) Leg edema, right


Status: Acute








- Assessment and Plan (Free Text)


Assessment: 


(1) DVT prophylaxis


Assessment & Plan: 


scd and ae hose


ambulation


Status: Chronic





(2) CAD (coronary artery disease)


Assessment & Plan: 


cont meds


Status: Chronic





(3) Smoking


Assessment & Plan: 


nicoderm


Status: Chronic





(4) Low back pain


Assessment & Plan: 


ibuprofen prn


Status: Chronic





(5) Scrotal edema


Status: Chronic





(6) Prediabetes


Assessment & Plan: 


dietary


Status: Chronic





(7) Neurocognitive disorder


Assessment & Plan: 


cont med


spendign placement


Status: Chronic








no further fever, trend c/s

## 2016-12-23 RX ADMIN — DIVALPROEX SODIUM SCH MG: 500 TABLET, DELAYED RELEASE ORAL at 10:24

## 2016-12-23 RX ADMIN — DIVALPROEX SODIUM SCH MG: 500 TABLET, DELAYED RELEASE ORAL at 17:10

## 2016-12-23 NOTE — CP.PCM.PN
Subjective





- Date & Time of Evaluation


Date of Evaluation: 12/23/16


Time of Evaluation: 09:04





- Subjective


Subjective: 


no complaints/distress


no f/c, n/v/d


no dysuria, urine c/s noted


pending placement





Objective





- Vital Signs/Intake and Output


Vital Signs (last 24 hours): 


 











Temp Pulse Resp BP Pulse Ox


 


 98.6 F   84   20   104/46 L  98 


 


 12/23/16 07:47  12/23/16 07:47  12/23/16 07:47  12/23/16 07:47  12/23/16 07:47











- Medications


Medications: 


 Current Medications





Acetaminophen (Tylenol 325mg Tab)  650 mg PO Q6 PRN


   PRN Reason: Fever >100.4 F


   Last Admin: 12/22/16 19:54 Dose:  650 mg


Aspirin (Ecotrin)  81 mg PO DAILY [facility]


   Last Admin: 12/22/16 08:51 Dose:  81 mg


Atorvastatin Calcium (Lipitor)  20 mg PO HS [facility]


   Last Admin: 12/22/16 21:55 Dose:  20 mg


Ciprofloxacin (Cipro)  500 mg PO Q12 [facility]


   Last Admin: 12/22/16 21:57 Dose:  500 mg


Divalproex Sodium (Depakote Dr(*Bid*))  500 mg PO BID [facility]


   Last Admin: 12/22/16 16:49 Dose:  500 mg


Enalapril Maleate (Vasotec)  10 mg PO DAILY [facility]


   Last Admin: 12/22/16 09:00 Dose:  Not Given


Famotidine (Pepcid)  20 mg PO BID [facility]


   Last Admin: 12/22/16 16:48 Dose:  20 mg


Ibuprofen (Motrin Tab)  600 mg PO Q8 PRN


   PRN Reason: Pain, moderate (4-7)


   Last Admin: 12/21/16 11:51 Dose:  600 mg


Metoprolol Tartrate (Lopressor)  50 mg PO Q12 [facility]


   Last Admin: 12/22/16 21:55 Dose:  50 mg


Promethazine HCl/Dextromethorphan (Phenergan Dm Syrup)  5 ml PO Q6 PRN


   PRN Reason: Cough


   Last Admin: 12/22/16 08:54 Dose:  5 ml


Quetiapine Fumarate (Seroquel)  25 mg PO HS [facility]


   Last Admin: 12/22/16 21:55 Dose:  25 mg











- Labs


Labs: 


 





 12/21/16 08:02 





 12/21/16 08:02 





 











PT  11.2 SECONDS (9.4-12.0)   12/14/15  05:20    


 


INR  1.05  (0.89-1.20)   12/14/15  05:20    


 


APTT  36.2 SECONDS (23.1-32.0)  H  12/14/15  05:20    














- Constitutional


Appears: Well, Non-toxic, No Acute Distress





- Head Exam


Head Exam: ATRAUMATIC, NORMAL INSPECTION, NORMOCEPHALIC





- Eye Exam


Eye Exam: EOMI, Normal appearance, PERRL


Pupil Exam: NORMAL ACCOMODATION, PERRL





- ENT Exam


ENT Exam: Mucous Membranes Moist, Normal Exam





- Neck Exam


Neck Exam: Full ROM, Normal Inspection.  absent: Lymphadenopathy





- Respiratory Exam


Respiratory Exam: Clear to Ausculation Bilateral, NORMAL BREATHING PATTERN





- Cardiovascular Exam


Cardiovascular Exam: REGULAR RHYTHM, RRR, +S1, +S2.  absent: Murmur





- GI/Abdominal Exam


GI & Abdominal Exam: Soft, Normal Bowel Sounds.  absent: Tenderness





- Extremities Exam


Extremities Exam: Full ROM, Normal Capillary Refill, Normal Inspection.  absent

: Joint Swelling, Pedal Edema





- Back Exam


Back Exam: NORMAL INSPECTION





- Neurological Exam


Neurological Exam: Alert, Awake, CN II-XII Intact, Normal Gait, Oriented x3





- Psychiatric Exam


Psychiatric exam: Normal Affect, Normal Mood





- Skin


Skin Exam: Dry, Intact, Normal Color, Warm





Assessment and Plan


(1) DVT prophylaxis


Status: Chronic





(2) CAD (coronary artery disease)


Status: Chronic





(3) Smoking


Status: Chronic





(4) Low back pain


Status: Chronic





(5) Scrotal edema


Status: Chronic





(6) Prediabetes


Status: Chronic





(7) Neurocognitive disorder


Status: Chronic





(8) Leg edema, right


Status: Acute








- Assessment and Plan (Free Text)


Assessment: 


(1) DVT prophylaxis


Assessment & Plan: 


scd and ae hose


ambulation


Status: Chronic





(2) CAD (coronary artery disease)


Assessment & Plan: 


cont meds


Status: Chronic





(3) Smoking


Assessment & Plan: 


nicoderm


Status: Chronic





(4) Low back pain


Assessment & Plan: 


ibuprofen prn


Status: Chronic





(5) Scrotal edema


Status: Chronic





(6) Prediabetes


Assessment & Plan: 


dietary


Status: Chronic





(7) Neurocognitive disorder


Assessment & Plan: 


cont med


spendign placement


Status: Chronic


uti-sensitive to macrobid

## 2016-12-24 RX ADMIN — DIVALPROEX SODIUM SCH MG: 500 TABLET, DELAYED RELEASE ORAL at 16:17

## 2016-12-24 RX ADMIN — DIVALPROEX SODIUM SCH MG: 500 TABLET, DELAYED RELEASE ORAL at 09:03

## 2016-12-24 NOTE — CP.PCM.PN
Subjective





- Date & Time of Evaluation


Date of Evaluation: 12/24/16


Time of Evaluation: 08:09





- Subjective


Subjective: 


n o compalints/distress


no f/c, n/v/d


pending placement





Objective





- Vital Signs/Intake and Output


Vital Signs (last 24 hours): 


 











Temp Pulse Resp BP Pulse Ox


 


 97.9 F   98 H  18   116/74   98 


 


 12/24/16 07:47  12/24/16 07:47  12/24/16 07:47  12/24/16 07:47  12/24/16 07:47











- Medications


Medications: 


 Current Medications





Acetaminophen (Tylenol 325mg Tab)  650 mg PO Q6 PRN


   PRN Reason: Fever >100.4 F


   Last Admin: 12/22/16 19:54 Dose:  650 mg


Aspirin (Ecotrin)  81 mg PO DAILY [facility]


   Last Admin: 12/23/16 10:24 Dose:  81 mg


Atorvastatin Calcium (Lipitor)  20 mg PO HS [facility]


   Last Admin: 12/23/16 21:51 Dose:  20 mg


Divalproex Sodium (Depakote Dr(*Bid*))  500 mg PO BID [facility]


   Last Admin: 12/23/16 17:10 Dose:  500 mg


Enalapril Maleate (Vasotec)  10 mg PO DAILY [facility]


   Last Admin: 12/23/16 10:24 Dose:  10 mg


Famotidine (Pepcid)  20 mg PO BID [facility]


   Last Admin: 12/23/16 17:11 Dose:  20 mg


Ibuprofen (Motrin Tab)  600 mg PO Q8 PRN


   PRN Reason: Pain, moderate (4-7)


   Last Admin: 12/21/16 11:51 Dose:  600 mg


Metoprolol Tartrate (Lopressor)  50 mg PO Q12 [facility]


   Last Admin: 12/23/16 21:48 Dose:  50 mg


Nitrofurantoin Macrocrystals (Macrobid)  100 mg PO Q12 [facility]


   Last Admin: 12/23/16 21:47 Dose:  100 mg


Promethazine HCl/Dextromethorphan (Phenergan Dm Syrup)  5 ml PO Q6 PRN


   PRN Reason: Cough


   Last Admin: 12/22/16 08:54 Dose:  5 ml


Quetiapine Fumarate (Seroquel)  25 mg PO HS [facility]


   Last Admin: 12/23/16 21:47 Dose:  25 mg











- Labs


Labs: 


 





 12/21/16 08:02 





 12/21/16 08:02 





 











PT  11.2 SECONDS (9.4-12.0)   12/14/15  05:20    


 


INR  1.05  (0.89-1.20)   12/14/15  05:20    


 


APTT  36.2 SECONDS (23.1-32.0)  H  12/14/15  05:20    














- Constitutional


Appears: Well, Non-toxic, No Acute Distress





- Head Exam


Head Exam: ATRAUMATIC, NORMAL INSPECTION, NORMOCEPHALIC





- Eye Exam


Eye Exam: EOMI, Normal appearance, PERRL


Pupil Exam: NORMAL ACCOMODATION, PERRL





- ENT Exam


ENT Exam: Mucous Membranes Moist, Normal Exam





- Neck Exam


Neck Exam: Full ROM, Normal Inspection.  absent: Lymphadenopathy





- Respiratory Exam


Respiratory Exam: Clear to Ausculation Bilateral, NORMAL BREATHING PATTERN





- Cardiovascular Exam


Cardiovascular Exam: REGULAR RHYTHM, RRR, +S1, +S2.  absent: Murmur





- GI/Abdominal Exam


GI & Abdominal Exam: Soft, Normal Bowel Sounds.  absent: Tenderness





- Extremities Exam


Extremities Exam: Full ROM, Normal Capillary Refill, Normal Inspection.  absent

: Joint Swelling, Pedal Edema





- Back Exam


Back Exam: NORMAL INSPECTION





- Neurological Exam


Neurological Exam: Alert, Awake, CN II-XII Intact, Normal Gait, Oriented x3





- Psychiatric Exam


Psychiatric exam: Normal Affect, Normal Mood





- Skin


Skin Exam: Dry, Intact, Normal Color, Warm





Assessment and Plan


(1) DVT prophylaxis


Status: Chronic





(2) CAD (coronary artery disease)


Status: Chronic





(3) Smoking


Status: Chronic





(4) Low back pain


Status: Chronic





(5) Scrotal edema


Status: Chronic





(6) Prediabetes


Status: Chronic





(7) Neurocognitive disorder


Status: Chronic





(8) Leg edema, right


Status: Acute








- Assessment and Plan (Free Text)


Assessment: 


(1) DVT prophylaxis


Assessment & Plan: 


scd and ae hose


ambulation


Status: Chronic





(2) CAD (coronary artery disease)


Assessment & Plan: 


cont meds


Status: Chronic





(3) Smoking


Assessment & Plan: 


nicoderm


Status: Chronic





(4) Low back pain


Assessment & Plan: 


ibuprofen prn


Status: Chronic





(5) Scrotal edema


Status: Chronic





(6) Prediabetes


Assessment & Plan: 


dietary


Status: Chronic





(7) Neurocognitive disorder


Assessment & Plan: 


cont med


spendign placement


Status: Chronic


uti-sensitive to macrobid

## 2016-12-25 RX ADMIN — DIVALPROEX SODIUM SCH MG: 500 TABLET, DELAYED RELEASE ORAL at 08:13

## 2016-12-25 RX ADMIN — DIVALPROEX SODIUM SCH MG: 500 TABLET, DELAYED RELEASE ORAL at 16:07

## 2016-12-25 NOTE — CP.PCM.PN
Subjective





- Date & Time of Evaluation


Date of Evaluation: 12/25/16


Time of Evaluation: 08:55





- Subjective


Subjective: 


no complaints/distress


no f/c, n/v/d


penidn gplacement





Objective





- Vital Signs/Intake and Output


Vital Signs (last 24 hours): 


 











Temp Pulse Resp BP Pulse Ox


 


 97.9 F   79   18   112/62   98 


 


 12/25/16 07:29  12/25/16 08:14  12/25/16 07:29  12/25/16 07:29  12/25/16 07:29











- Medications


Medications: 


 Current Medications





Acetaminophen (Tylenol 325mg Tab)  650 mg PO Q6 PRN


   PRN Reason: Fever >100.4 F


   Last Admin: 12/22/16 19:54 Dose:  650 mg


Aspirin (Ecotrin)  81 mg PO DAILY [facility]


   Last Admin: 12/25/16 08:14 Dose:  81 mg


Atorvastatin Calcium (Lipitor)  20 mg PO HS [facility]


   Last Admin: 12/24/16 21:28 Dose:  20 mg


Divalproex Sodium (Depakote Dr(*Bid*))  500 mg PO BID [facility]


   Last Admin: 12/25/16 08:13 Dose:  500 mg


Enalapril Maleate (Vasotec)  10 mg PO DAILY [facility]


   Last Admin: 12/25/16 08:14 Dose:  10 mg


Famotidine (Pepcid)  20 mg PO BID [facility]


   Last Admin: 12/25/16 08:14 Dose:  20 mg


Ibuprofen (Motrin Tab)  600 mg PO Q8 PRN


   PRN Reason: Pain, moderate (4-7)


   Last Admin: 12/21/16 11:51 Dose:  600 mg


Metoprolol Tartrate (Lopressor)  50 mg PO Q12 [facility]


   Last Admin: 12/25/16 08:14 Dose:  50 mg


Nitrofurantoin Macrocrystals (Macrobid)  100 mg PO Q12 [facility]


   Last Admin: 12/25/16 08:13 Dose:  100 mg


Promethazine HCl/Dextromethorphan (Phenergan Dm Syrup)  5 ml PO Q6 PRN


   PRN Reason: Cough


   Last Admin: 12/22/16 08:54 Dose:  5 ml


Quetiapine Fumarate (Seroquel)  25 mg PO HS [facility]


   Last Admin: 12/24/16 21:28 Dose:  25 mg











- Labs


Labs: 


 





 12/21/16 08:02 





 12/21/16 08:02 





 











PT  11.2 SECONDS (9.4-12.0)   12/14/15  05:20    


 


INR  1.05  (0.89-1.20)   12/14/15  05:20    


 


APTT  36.2 SECONDS (23.1-32.0)  H  12/14/15  05:20    














- Constitutional


Appears: Well, Non-toxic, No Acute Distress





- Head Exam


Head Exam: ATRAUMATIC, NORMAL INSPECTION, NORMOCEPHALIC





- Eye Exam


Eye Exam: EOMI, Normal appearance, PERRL


Pupil Exam: NORMAL ACCOMODATION, PERRL





- ENT Exam


ENT Exam: Mucous Membranes Moist, Normal Exam





- Neck Exam


Neck Exam: Full ROM, Normal Inspection.  absent: Lymphadenopathy





- Respiratory Exam


Respiratory Exam: Clear to Ausculation Bilateral, NORMAL BREATHING PATTERN





- Cardiovascular Exam


Cardiovascular Exam: REGULAR RHYTHM, RRR, +S1, +S2.  absent: Murmur





- GI/Abdominal Exam


GI & Abdominal Exam: Soft, Normal Bowel Sounds.  absent: Tenderness





- Extremities Exam


Extremities Exam: Full ROM, Normal Capillary Refill, Normal Inspection.  absent

: Joint Swelling, Pedal Edema





- Back Exam


Back Exam: NORMAL INSPECTION





- Neurological Exam


Neurological Exam: Alert, Awake, CN II-XII Intact, Normal Gait, Oriented x3





- Psychiatric Exam


Psychiatric exam: Normal Affect, Normal Mood





- Skin


Skin Exam: Dry, Intact, Normal Color, Warm





Assessment and Plan


(1) DVT prophylaxis


Status: Chronic





(2) CAD (coronary artery disease)


Status: Chronic





(3) Smoking


Status: Chronic





(4) Low back pain


Status: Chronic





(5) Scrotal edema


Status: Chronic





(6) Prediabetes


Status: Chronic





(7) Neurocognitive disorder


Status: Chronic





(8) Leg edema, right


Status: Acute








- Assessment and Plan (Free Text)


Assessment: 


(1) DVT prophylaxis


Assessment & Plan: 


scd and ae hose


ambulation


Status: Chronic





(2) CAD (coronary artery disease)


Assessment & Plan: 


cont meds


Status: Chronic





(3) Smoking


Assessment & Plan: 


nicoderm


Status: Chronic





(4) Low back pain


Assessment & Plan: 


ibuprofen prn


Status: Chronic





(5) Scrotal edema


Status: Chronic





(6) Prediabetes


Assessment & Plan: 


dietary


Status: Chronic





(7) Neurocognitive disorder


Assessment & Plan: 


cont med


spendign placement


Status: Chronic


uti-sensitive to macrobid

## 2016-12-26 RX ADMIN — DIVALPROEX SODIUM SCH MG: 500 TABLET, DELAYED RELEASE ORAL at 09:05

## 2016-12-26 RX ADMIN — DIVALPROEX SODIUM SCH MG: 500 TABLET, DELAYED RELEASE ORAL at 16:36

## 2016-12-26 NOTE — CP.PCM.PN
Subjective





- Date & Time of Evaluation


Date of Evaluation: 12/26/16


Time of Evaluation: 07:34





- Subjective


Subjective: 


no complaints/distress


no f/c, n/v/d


pending placement





Objective





- Vital Signs/Intake and Output


Vital Signs (last 24 hours): 


 











Temp Pulse Resp BP Pulse Ox


 


 98.1 F   84   18   126/72   97 


 


 12/25/16 21:15  12/25/16 21:16  12/25/16 21:15  12/25/16 21:16  12/25/16 21:15











- Medications


Medications: 


 Current Medications





Acetaminophen (Tylenol 325mg Tab)  650 mg PO Q6 PRN


   PRN Reason: Fever >100.4 F


   Last Admin: 12/22/16 19:54 Dose:  650 mg


Aspirin (Ecotrin)  81 mg PO DAILY [facility]


   Last Admin: 12/25/16 08:14 Dose:  81 mg


Atorvastatin Calcium (Lipitor)  20 mg PO HS [facility]


   Last Admin: 12/25/16 21:16 Dose:  20 mg


Divalproex Sodium (Depakote Dr(*Bid*))  500 mg PO BID [facility]


   Last Admin: 12/25/16 16:07 Dose:  500 mg


Enalapril Maleate (Vasotec)  10 mg PO DAILY [facility]


   Last Admin: 12/25/16 08:14 Dose:  10 mg


Famotidine (Pepcid)  20 mg PO BID [facility]


   Last Admin: 12/25/16 16:07 Dose:  20 mg


Ibuprofen (Motrin Tab)  600 mg PO Q8 PRN


   PRN Reason: Pain, moderate (4-7)


   Last Admin: 12/21/16 11:51 Dose:  600 mg


Metoprolol Tartrate (Lopressor)  50 mg PO Q12 [facility]


   Last Admin: 12/25/16 21:16 Dose:  50 mg


Nitrofurantoin Macrocrystals (Macrobid)  100 mg PO Q12 [facility]


   Last Admin: 12/25/16 21:16 Dose:  100 mg


Promethazine HCl/Dextromethorphan (Phenergan Dm Syrup)  5 ml PO Q6 PRN


   PRN Reason: Cough


   Last Admin: 12/22/16 08:54 Dose:  5 ml


Quetiapine Fumarate (Seroquel)  25 mg PO HS [facility]


   Last Admin: 12/25/16 21:16 Dose:  25 mg











- Labs


Labs: 


 





 12/21/16 08:02 





 12/21/16 08:02 





 











PT  11.2 SECONDS (9.4-12.0)   12/14/15  05:20    


 


INR  1.05  (0.89-1.20)   12/14/15  05:20    


 


APTT  36.2 SECONDS (23.1-32.0)  H  12/14/15  05:20    














- Constitutional


Appears: Well, Non-toxic, No Acute Distress





- Head Exam


Head Exam: ATRAUMATIC, NORMAL INSPECTION, NORMOCEPHALIC





- Eye Exam


Eye Exam: EOMI, Normal appearance, PERRL


Pupil Exam: NORMAL ACCOMODATION, PERRL





- ENT Exam


ENT Exam: Mucous Membranes Moist, Normal Exam





- Neck Exam


Neck Exam: Full ROM, Normal Inspection.  absent: Lymphadenopathy





- Respiratory Exam


Respiratory Exam: Clear to Ausculation Bilateral, NORMAL BREATHING PATTERN





- Cardiovascular Exam


Cardiovascular Exam: REGULAR RHYTHM, RRR, +S1, +S2.  absent: Murmur





- GI/Abdominal Exam


GI & Abdominal Exam: Soft, Normal Bowel Sounds.  absent: Tenderness





- Extremities Exam


Extremities Exam: Full ROM, Normal Capillary Refill, Normal Inspection.  absent

: Joint Swelling, Pedal Edema





- Back Exam


Back Exam: NORMAL INSPECTION





- Neurological Exam


Neurological Exam: Alert, Awake, CN II-XII Intact, Normal Gait, Oriented x3





- Psychiatric Exam


Psychiatric exam: Normal Affect, Normal Mood





- Skin


Skin Exam: Dry, Intact, Normal Color, Warm





Assessment and Plan


(1) DVT prophylaxis


Status: Chronic





(2) CAD (coronary artery disease)


Status: Chronic





(3) Smoking


Status: Chronic





(4) Low back pain


Status: Chronic





(5) Scrotal edema


Status: Chronic





(6) Prediabetes


Status: Chronic





(7) Neurocognitive disorder


Status: Chronic





(8) Leg edema, right


Status: Acute








- Assessment and Plan (Free Text)


Assessment: 


(1) DVT prophylaxis


Assessment & Plan: 


scd and ae hose


ambulation


Status: Chronic





(2) CAD (coronary artery disease)


Assessment & Plan: 


cont meds


Status: Chronic





(3) Smoking


Assessment & Plan: 


nicoderm


Status: Chronic





(4) Low back pain


Assessment & Plan: 


ibuprofen prn


Status: Chronic





(5) Scrotal edema


Status: Chronic





(6) Prediabetes


Assessment & Plan: 


dietary


Status: Chronic





(7) Neurocognitive disorder


Assessment & Plan: 


cont med


spendign placement


Status: Chronic


uti-sensitive to macrobid

## 2016-12-27 RX ADMIN — DIVALPROEX SODIUM SCH MG: 500 TABLET, DELAYED RELEASE ORAL at 16:03

## 2016-12-27 RX ADMIN — DIVALPROEX SODIUM SCH MG: 500 TABLET, DELAYED RELEASE ORAL at 09:05

## 2016-12-27 NOTE — CP.PCM.PN
Subjective





- Date & Time of Evaluation


Date of Evaluation: 12/27/16


Time of Evaluation: 07:02





- Subjective


Subjective: 


no complaints offered/distress


no f/c, n/v/d


pending placement





Objective





- Vital Signs/Intake and Output


Vital Signs (last 24 hours): 


 











Temp Pulse Resp BP Pulse Ox


 


 99 F   70   18   127/70   95 


 


 12/26/16 22:51  12/26/16 22:51  12/26/16 22:51  12/26/16 22:51  12/26/16 22:51











- Medications


Medications: 


 Current Medications





Acetaminophen (Tylenol 325mg Tab)  650 mg PO Q6 PRN


   PRN Reason: Fever >100.4 F


   Last Admin: 12/22/16 19:54 Dose:  650 mg


Aspirin (Ecotrin)  81 mg PO DAILY [facility]


   Last Admin: 12/26/16 09:04 Dose:  81 mg


Atorvastatin Calcium (Lipitor)  20 mg PO HS [facility]


   Last Admin: 12/26/16 22:00 Dose:  20 mg


Divalproex Sodium (Depakote Dr(*Bid*))  500 mg PO BID [facility]


   Last Admin: 12/26/16 16:36 Dose:  500 mg


Enalapril Maleate (Vasotec)  10 mg PO DAILY [facility]


   Last Admin: 12/26/16 09:05 Dose:  10 mg


Famotidine (Pepcid)  20 mg PO BID [facility]


   Last Admin: 12/26/16 16:37 Dose:  20 mg


Ibuprofen (Motrin Tab)  600 mg PO Q8 PRN


   PRN Reason: Pain, moderate (4-7)


   Last Admin: 12/21/16 11:51 Dose:  600 mg


Metoprolol Tartrate (Lopressor)  50 mg PO Q12 [facility]


   Last Admin: 12/26/16 22:01 Dose:  50 mg


Nitrofurantoin Macrocrystals (Macrobid)  100 mg PO Q12 [facility]


   Last Admin: 12/26/16 22:00 Dose:  100 mg


Promethazine HCl/Dextromethorphan (Phenergan Dm Syrup)  5 ml PO Q6 PRN


   PRN Reason: Cough


   Last Admin: 12/22/16 08:54 Dose:  5 ml


Quetiapine Fumarate (Seroquel)  25 mg PO HS [facility]


   Last Admin: 12/26/16 22:01 Dose:  25 mg











- Labs


Labs: 


 





 12/21/16 08:02 





 12/21/16 08:02 





 











PT  11.2 SECONDS (9.4-12.0)   12/14/15  05:20    


 


INR  1.05  (0.89-1.20)   12/14/15  05:20    


 


APTT  36.2 SECONDS (23.1-32.0)  H  12/14/15  05:20    














- Constitutional


Appears: Well, Non-toxic, No Acute Distress





- Head Exam


Head Exam: ATRAUMATIC, NORMAL INSPECTION, NORMOCEPHALIC





- Eye Exam


Eye Exam: EOMI, Normal appearance, PERRL


Pupil Exam: NORMAL ACCOMODATION, PERRL





- ENT Exam


ENT Exam: Mucous Membranes Moist, Normal Exam





- Neck Exam


Neck Exam: Full ROM, Normal Inspection.  absent: Lymphadenopathy





- Respiratory Exam


Respiratory Exam: Clear to Ausculation Bilateral, NORMAL BREATHING PATTERN





- Cardiovascular Exam


Cardiovascular Exam: REGULAR RHYTHM, RRR, +S1, +S2.  absent: Murmur





- GI/Abdominal Exam


GI & Abdominal Exam: Soft, Normal Bowel Sounds.  absent: Tenderness





- Extremities Exam


Extremities Exam: Full ROM, Normal Capillary Refill, Normal Inspection.  absent

: Joint Swelling, Pedal Edema





- Back Exam


Back Exam: NORMAL INSPECTION





- Neurological Exam


Neurological Exam: Alert, Awake, CN II-XII Intact, Normal Gait, Oriented x3





- Psychiatric Exam


Psychiatric exam: Normal Affect, Normal Mood





- Skin


Skin Exam: Dry, Intact, Normal Color, Warm





Assessment and Plan


(1) DVT prophylaxis


Status: Chronic





(2) CAD (coronary artery disease)


Status: Chronic





(3) Smoking


Status: Chronic





(4) Low back pain


Status: Chronic





(5) Scrotal edema


Status: Chronic





(6) Prediabetes


Status: Chronic





(7) Neurocognitive disorder


Status: Chronic





(8) Leg edema, right


Status: Acute








- Assessment and Plan (Free Text)


Assessment: 


(1) DVT prophylaxis


Assessment & Plan: 


scd and ae hose


ambulation


Status: Chronic





(2) CAD (coronary artery disease)


Assessment & Plan: 


cont meds


Status: Chronic





(3) Smoking


Assessment & Plan: 


nicoderm


Status: Chronic





(4) Low back pain


Assessment & Plan: 


ibuprofen prn


Status: Chronic





(5) Scrotal edema


Status: Chronic





(6) Prediabetes


Assessment & Plan: 


dietary


Status: Chronic





(7) Neurocognitive disorder


Assessment & Plan: 


cont med


spendign placement


Status: Chronic





uti macrboid x 10 days

## 2016-12-28 RX ADMIN — DIVALPROEX SODIUM SCH MG: 500 TABLET, DELAYED RELEASE ORAL at 09:04

## 2016-12-28 RX ADMIN — DIVALPROEX SODIUM SCH MG: 500 TABLET, DELAYED RELEASE ORAL at 17:33

## 2016-12-28 NOTE — CP.PCM.PN
Subjective





- Date & Time of Evaluation


Date of Evaluation: 12/28/16


Time of Evaluation: 07:27





- Subjective


Subjective: 


no compalints/distress


no f/c, n/v/d


epnding placement





Objective





- Vital Signs/Intake and Output


Vital Signs (last 24 hours): 


 











Temp Pulse Resp BP Pulse Ox


 


 98.8 F   74   19   120/70   97 


 


 12/27/16 22:00  12/27/16 22:00  12/27/16 22:00  12/27/16 22:00  12/27/16 22:00











- Medications


Medications: 


 Current Medications





Acetaminophen (Tylenol 325mg Tab)  650 mg PO Q6 PRN


   PRN Reason: Fever >100.4 F


   Last Admin: 12/22/16 19:54 Dose:  650 mg


Aspirin (Ecotrin)  81 mg PO DAILY [facility]


   Last Admin: 12/27/16 09:05 Dose:  81 mg


Atorvastatin Calcium (Lipitor)  20 mg PO HS [facility]


   Last Admin: 12/27/16 22:24 Dose:  20 mg


Divalproex Sodium (Depakote Dr(*Bid*))  500 mg PO BID [facility]


   Last Admin: 12/27/16 16:03 Dose:  500 mg


Enalapril Maleate (Vasotec)  10 mg PO DAILY [facility]


   Last Admin: 12/27/16 16:04 Dose:  10 mg


Famotidine (Pepcid)  20 mg PO BID [facility]


   Last Admin: 12/27/16 16:03 Dose:  20 mg


Ibuprofen (Motrin Tab)  600 mg PO Q8 PRN


   PRN Reason: Pain, moderate (4-7)


   Last Admin: 12/21/16 11:51 Dose:  600 mg


Metoprolol Tartrate (Lopressor)  50 mg PO Q12 [facility]


   Last Admin: 12/27/16 22:00 Dose:  50 mg


Nitrofurantoin Macrocrystals (Macrobid)  100 mg PO Q12 [facility]


   Last Admin: 12/27/16 22:00 Dose:  100 mg


Promethazine HCl/Dextromethorphan (Phenergan Dm Syrup)  5 ml PO Q6 PRN


   PRN Reason: Cough


   Last Admin: 12/22/16 08:54 Dose:  5 ml


Quetiapine Fumarate (Seroquel)  25 mg PO HS [facility]


   Last Admin: 12/27/16 22:23 Dose:  25 mg











- Labs


Labs: 


 





 12/21/16 08:02 





 12/21/16 08:02 





 











PT  11.2 SECONDS (9.4-12.0)   12/14/15  05:20    


 


INR  1.05  (0.89-1.20)   12/14/15  05:20    


 


APTT  36.2 SECONDS (23.1-32.0)  H  12/14/15  05:20    














- Constitutional


Appears: Well, Non-toxic, No Acute Distress





- Head Exam


Head Exam: ATRAUMATIC, NORMAL INSPECTION, NORMOCEPHALIC





- Eye Exam


Eye Exam: EOMI, Normal appearance, PERRL


Pupil Exam: NORMAL ACCOMODATION, PERRL





- ENT Exam


ENT Exam: Mucous Membranes Moist, Normal Exam





- Neck Exam


Neck Exam: Full ROM, Normal Inspection.  absent: Lymphadenopathy





- Respiratory Exam


Respiratory Exam: Clear to Ausculation Bilateral, NORMAL BREATHING PATTERN





- Cardiovascular Exam


Cardiovascular Exam: REGULAR RHYTHM, RRR, +S1, +S2.  absent: Murmur





- GI/Abdominal Exam


GI & Abdominal Exam: Soft, Normal Bowel Sounds.  absent: Tenderness





- Extremities Exam


Extremities Exam: Full ROM, Normal Capillary Refill, Normal Inspection.  absent

: Joint Swelling, Pedal Edema





- Back Exam


Back Exam: NORMAL INSPECTION





- Neurological Exam


Neurological Exam: Alert, Awake, CN II-XII Intact, Normal Gait, Oriented x3





- Psychiatric Exam


Psychiatric exam: Normal Affect, Normal Mood





- Skin


Skin Exam: Dry, Intact, Normal Color, Warm





Assessment and Plan


(1) DVT prophylaxis


Status: Chronic





(2) CAD (coronary artery disease)


Status: Chronic





(3) Smoking


Status: Chronic





(4) Low back pain


Status: Chronic





(5) Scrotal edema


Status: Chronic





(6) Prediabetes


Status: Chronic





(7) Neurocognitive disorder


Status: Chronic





(8) Leg edema, right


Status: Acute








- Assessment and Plan (Free Text)


Assessment: 


(1) DVT prophylaxis


Assessment & Plan: 


scd and ae hose


ambulation


Status: Chronic





(2) CAD (coronary artery disease)


Assessment & Plan: 


cont meds


Status: Chronic





(3) Smoking


Assessment & Plan: 


nicoderm


Status: Chronic





(4) Low back pain


Assessment & Plan: 


ibuprofen prn


Status: Chronic





(5) Scrotal edema


Status: Chronic





(6) Prediabetes


Assessment & Plan: 


dietary


Status: Chronic





(7) Neurocognitive disorder


Assessment & Plan: 


cont med


spendign placement


Status: Chronic





uti macrboid x 10 days

## 2016-12-29 RX ADMIN — DIVALPROEX SODIUM SCH MG: 500 TABLET, DELAYED RELEASE ORAL at 16:41

## 2016-12-29 RX ADMIN — DIVALPROEX SODIUM SCH MG: 500 TABLET, DELAYED RELEASE ORAL at 08:52

## 2016-12-29 NOTE — CP.PCM.PN
Subjective





- Date & Time of Evaluation


Date of Evaluation: 12/29/16


Time of Evaluation: 07:07





- Subjective


Subjective: 


pt in no distress/complaints. no f/c, n/v/d


pending placement





Objective





- Vital Signs/Intake and Output


Vital Signs (last 24 hours): 


 











Temp Pulse Resp BP Pulse Ox


 


 97.8 F   79   20   122/75   95 


 


 12/28/16 20:23  12/28/16 21:25  12/28/16 20:23  12/28/16 21:25  12/28/16 20:23











- Medications


Medications: 


 Current Medications





Acetaminophen (Tylenol 325mg Tab)  650 mg PO Q6 PRN


   PRN Reason: Fever >100.4 F


   Last Admin: 12/22/16 19:54 Dose:  650 mg


Aspirin (Ecotrin)  81 mg PO DAILY [facility]


   Last Admin: 12/28/16 09:04 Dose:  81 mg


Atorvastatin Calcium (Lipitor)  20 mg PO HS [facility]


   Last Admin: 12/28/16 21:25 Dose:  20 mg


Divalproex Sodium (Depakote Dr(*Bid*))  500 mg PO BID [facility]


   Last Admin: 12/28/16 17:33 Dose:  500 mg


Enalapril Maleate (Vasotec)  10 mg PO DAILY [facility]


   Last Admin: 12/28/16 17:34 Dose:  10 mg


Famotidine (Pepcid)  20 mg PO BID [facility]


   Last Admin: 12/28/16 17:33 Dose:  20 mg


Ibuprofen (Motrin Tab)  600 mg PO Q8 PRN


   PRN Reason: Pain, moderate (4-7)


   Last Admin: 12/21/16 11:51 Dose:  600 mg


Metoprolol Tartrate (Lopressor)  50 mg PO Q12 [facility]


   Last Admin: 12/28/16 21:25 Dose:  50 mg


Nitrofurantoin Macrocrystals (Macrobid)  100 mg PO Q12 [facility]


   Last Admin: 12/28/16 20:30 Dose:  100 mg


Promethazine HCl/Dextromethorphan (Phenergan Dm Syrup)  5 ml PO Q6 PRN


   PRN Reason: Cough


   Last Admin: 12/22/16 08:54 Dose:  5 ml


Quetiapine Fumarate (Seroquel)  25 mg PO HS [facility]


   Last Admin: 12/28/16 21:25 Dose:  25 mg











- Labs


Labs: 


 





 12/21/16 08:02 





 12/21/16 08:02 





 











PT  11.2 SECONDS (9.4-12.0)   12/14/15  05:20    


 


INR  1.05  (0.89-1.20)   12/14/15  05:20    


 


APTT  36.2 SECONDS (23.1-32.0)  H  12/14/15  05:20    














- Constitutional


Appears: Well, Non-toxic, No Acute Distress





- Head Exam


Head Exam: ATRAUMATIC, NORMAL INSPECTION, NORMOCEPHALIC





- Eye Exam


Eye Exam: EOMI, Normal appearance, PERRL


Pupil Exam: NORMAL ACCOMODATION, PERRL





- ENT Exam


ENT Exam: Mucous Membranes Moist, Normal Exam





- Neck Exam


Neck Exam: Full ROM, Normal Inspection.  absent: Lymphadenopathy





- Respiratory Exam


Respiratory Exam: Clear to Ausculation Bilateral, NORMAL BREATHING PATTERN





- Cardiovascular Exam


Cardiovascular Exam: REGULAR RHYTHM, RRR, +S1, +S2.  absent: Murmur





- GI/Abdominal Exam


GI & Abdominal Exam: Soft, Normal Bowel Sounds.  absent: Tenderness





- Extremities Exam


Extremities Exam: Full ROM, Normal Capillary Refill, Normal Inspection.  absent

: Joint Swelling, Pedal Edema





- Back Exam


Back Exam: NORMAL INSPECTION





- Neurological Exam


Neurological Exam: Alert, Awake, CN II-XII Intact, Normal Gait, Oriented x3





- Psychiatric Exam


Psychiatric exam: Normal Affect, Normal Mood





- Skin


Skin Exam: Dry, Intact, Normal Color, Warm





Assessment and Plan


(1) DVT prophylaxis


Status: Chronic





(2) CAD (coronary artery disease)


Status: Chronic





(3) Smoking


Status: Chronic





(4) Low back pain


Status: Chronic





(5) Scrotal edema


Status: Chronic





(6) Prediabetes


Status: Chronic





(7) Neurocognitive disorder


Status: Chronic





(8) Leg edema, right


Status: Acute








- Assessment and Plan (Free Text)


Assessment: 


(1) DVT prophylaxis


Assessment & Plan: 


scd and ae hose


ambulation


Status: Chronic





(2) CAD (coronary artery disease)


Assessment & Plan: 


cont meds


Status: Chronic





(3) Smoking


Assessment & Plan: 


nicoderm


Status: Chronic





(4) Low back pain


Assessment & Plan: 


ibuprofen prn


Status: Chronic





(5) Scrotal edema


Status: Chronic





(6) Prediabetes


Assessment & Plan: 


dietary


Status: Chronic





(7) Neurocognitive disorder


Assessment & Plan: 


cont med


spendign placement


Status: Chronic





uti macrboid x 10 days

## 2016-12-30 RX ADMIN — DIVALPROEX SODIUM SCH MG: 500 TABLET, DELAYED RELEASE ORAL at 08:49

## 2016-12-30 RX ADMIN — DIVALPROEX SODIUM SCH MG: 500 TABLET, DELAYED RELEASE ORAL at 17:26

## 2016-12-31 RX ADMIN — DIVALPROEX SODIUM SCH MG: 500 TABLET, DELAYED RELEASE ORAL at 16:37

## 2016-12-31 RX ADMIN — DIVALPROEX SODIUM SCH MG: 500 TABLET, DELAYED RELEASE ORAL at 08:26

## 2016-12-31 NOTE — CP.PCM.PN
Subjective





- Date & Time of Evaluation


Date of Evaluation: 12/31/16


Time of Evaluation: 07:07





- Subjective


Subjective: 


no f/c, n/v/d


pneidn gplacement


no complaints/distress





Objective





- Vital Signs/Intake and Output


Vital Signs (last 24 hours): 


 











Temp Pulse Resp BP Pulse Ox


 


 98.2 F   80   18   137/77   97 


 


 12/30/16 16:10  12/30/16 21:13  12/30/16 16:10  12/30/16 21:13  12/30/16 16:10











- Medications


Medications: 


 Current Medications





Acetaminophen (Tylenol 325mg Tab)  650 mg PO Q6 PRN


   PRN Reason: Fever >100.4 F


   Last Admin: 12/22/16 19:54 Dose:  650 mg


Aspirin (Ecotrin)  81 mg PO DAILY [facility]


   Last Admin: 12/30/16 08:50 Dose:  81 mg


Atorvastatin Calcium (Lipitor)  20 mg PO HS [facility]


   Last Admin: 12/30/16 22:05 Dose:  20 mg


Divalproex Sodium (Depakote Dr(*Bid*))  500 mg PO BID [facility]


   Last Admin: 12/30/16 17:26 Dose:  500 mg


Enalapril Maleate (Vasotec)  10 mg PO DAILY [facility]


   Last Admin: 12/30/16 08:50 Dose:  10 mg


Famotidine (Pepcid)  20 mg PO BID [facility]


   Last Admin: 12/30/16 17:26 Dose:  20 mg


Ibuprofen (Motrin Tab)  600 mg PO Q8 PRN


   PRN Reason: Pain, moderate (4-7)


   Last Admin: 12/21/16 11:51 Dose:  600 mg


Metoprolol Tartrate (Lopressor)  50 mg PO Q12 [facility]


   Last Admin: 12/30/16 21:13 Dose:  50 mg


Nitrofurantoin Macrocrystals (Macrobid)  100 mg PO Q12 [facility]


   Last Admin: 12/30/16 21:14 Dose:  100 mg


Promethazine HCl/Dextromethorphan (Phenergan Dm Syrup)  5 ml PO Q6 PRN


   PRN Reason: Cough


   Last Admin: 12/22/16 08:54 Dose:  5 ml


Quetiapine Fumarate (Seroquel)  25 mg PO HS [facility]


   Last Admin: 12/30/16 22:05 Dose:  25 mg











- Labs


Labs: 


 





 12/21/16 08:02 





 12/21/16 08:02 





 











PT  11.2 SECONDS (9.4-12.0)   12/14/15  05:20    


 


INR  1.05  (0.89-1.20)   12/14/15  05:20    


 


APTT  36.2 SECONDS (23.1-32.0)  H  12/14/15  05:20    














- Constitutional


Appears: Well, Non-toxic, No Acute Distress





- Head Exam


Head Exam: ATRAUMATIC, NORMAL INSPECTION, NORMOCEPHALIC





- Eye Exam


Eye Exam: EOMI, Normal appearance, PERRL


Pupil Exam: NORMAL ACCOMODATION, PERRL





- ENT Exam


ENT Exam: Mucous Membranes Moist, Normal Exam





- Neck Exam


Neck Exam: Full ROM, Normal Inspection.  absent: Lymphadenopathy





- Respiratory Exam


Respiratory Exam: Clear to Ausculation Bilateral, NORMAL BREATHING PATTERN





- Cardiovascular Exam


Cardiovascular Exam: REGULAR RHYTHM, RRR, +S1, +S2.  absent: Murmur





- GI/Abdominal Exam


GI & Abdominal Exam: Soft, Normal Bowel Sounds.  absent: Tenderness





- Extremities Exam


Extremities Exam: Full ROM, Normal Capillary Refill, Normal Inspection.  absent

: Joint Swelling, Pedal Edema





- Back Exam


Back Exam: NORMAL INSPECTION





- Neurological Exam


Neurological Exam: Alert, Awake, CN II-XII Intact, Normal Gait, Oriented x3





- Psychiatric Exam


Psychiatric exam: Normal Affect, Normal Mood





- Skin


Skin Exam: Dry, Intact, Normal Color, Warm





Assessment and Plan


(1) DVT prophylaxis


Status: Chronic





(2) CAD (coronary artery disease)


Status: Chronic





(3) Smoking


Status: Chronic





(4) Low back pain


Status: Chronic





(5) Scrotal edema


Status: Chronic





(6) Prediabetes


Status: Chronic





(7) Neurocognitive disorder


Status: Chronic





(8) Leg edema, right


Status: Acute








- Assessment and Plan (Free Text)


Assessment: 


(1) DVT prophylaxis


Assessment & Plan: 


scd and ae hose


ambulation


Status: Chronic





(2) CAD (coronary artery disease)


Assessment & Plan: 


cont meds


Status: Chronic





(3) Smoking


Assessment & Plan: 


nicoderm


Status: Chronic





(4) Low back pain


Assessment & Plan: 


ibuprofen prn


Status: Chronic





(5) Scrotal edema


Status: Chronic





(6) Prediabetes


Assessment & Plan: 


dietary


Status: Chronic





(7) Neurocognitive disorder


Assessment & Plan: 


cont med


spendign placement


Status: Chronic





uti macrboid x 10 days

## 2017-01-01 RX ADMIN — DIVALPROEX SODIUM SCH MG: 500 TABLET, DELAYED RELEASE ORAL at 08:20

## 2017-01-01 RX ADMIN — DIVALPROEX SODIUM SCH MG: 500 TABLET, DELAYED RELEASE ORAL at 16:04

## 2017-01-01 NOTE — CP.PCM.PN
Subjective





- Date & Time of Evaluation


Date of Evaluation: 01/01/17


Time of Evaluation: 09:37





- Subjective


Subjective: 


no complaints/distress


no f/c, n/v/d


pending placement





Objective





- Vital Signs/Intake and Output


Vital Signs (last 24 hours): 


 











Temp Pulse Resp BP Pulse Ox


 


 97.3 F L  66   20   116/77   100 


 


 01/01/17 08:00  01/01/17 08:18  01/01/17 08:00  01/01/17 08:18  01/01/17 08:00











- Medications


Medications: 


 Current Medications





Acetaminophen (Tylenol 325mg Tab)  650 mg PO Q6 PRN


   PRN Reason: Fever >100.4 F


   Last Admin: 12/22/16 19:54 Dose:  650 mg


Aspirin (Ecotrin)  81 mg PO DAILY [facility]


   Last Admin: 01/01/17 08:20 Dose:  81 mg


Atorvastatin Calcium (Lipitor)  20 mg PO HS [facility]


   Last Admin: 12/31/16 21:35 Dose:  20 mg


Divalproex Sodium (Depakote Dr(*Bid*))  500 mg PO BID [facility]


   Last Admin: 01/01/17 08:20 Dose:  500 mg


Enalapril Maleate (Vasotec)  10 mg PO DAILY [facility]


   Last Admin: 01/01/17 08:19 Dose:  10 mg


Famotidine (Pepcid)  20 mg PO BID [facility]


   Last Admin: 01/01/17 08:19 Dose:  20 mg


Ibuprofen (Motrin Tab)  600 mg PO Q8 PRN


   PRN Reason: Pain, moderate (4-7)


   Last Admin: 12/21/16 11:51 Dose:  600 mg


Metoprolol Tartrate (Lopressor)  50 mg PO Q12 [facility]


   Last Admin: 01/01/17 08:18 Dose:  50 mg


Nitrofurantoin Macrocrystals (Macrobid)  100 mg PO Q12 [facility]


   Last Admin: 01/01/17 08:20 Dose:  100 mg


Promethazine HCl/Dextromethorphan (Phenergan Dm Syrup)  5 ml PO Q6 PRN


   PRN Reason: Cough


   Last Admin: 12/22/16 08:54 Dose:  5 ml


Quetiapine Fumarate (Seroquel)  25 mg PO HS [facility]


   Last Admin: 12/31/16 21:35 Dose:  25 mg











- Labs


Labs: 


 





 12/21/16 08:02 





 12/21/16 08:02 





 











PT  11.2 SECONDS (9.4-12.0)   12/14/15  05:20    


 


INR  1.05  (0.89-1.20)   12/14/15  05:20    


 


APTT  36.2 SECONDS (23.1-32.0)  H  12/14/15  05:20    














- Constitutional


Appears: Well, Non-toxic, No Acute Distress





- Head Exam


Head Exam: ATRAUMATIC, NORMAL INSPECTION, NORMOCEPHALIC





- Eye Exam


Eye Exam: EOMI, Normal appearance, PERRL


Pupil Exam: NORMAL ACCOMODATION, PERRL





- ENT Exam


ENT Exam: Mucous Membranes Moist, Normal Exam





- Neck Exam


Neck Exam: Full ROM, Normal Inspection.  absent: Lymphadenopathy





- Respiratory Exam


Respiratory Exam: Clear to Ausculation Bilateral, NORMAL BREATHING PATTERN





- Cardiovascular Exam


Cardiovascular Exam: REGULAR RHYTHM, RRR, +S1, +S2.  absent: Murmur





- GI/Abdominal Exam


GI & Abdominal Exam: Soft, Normal Bowel Sounds.  absent: Tenderness





-  Exam


External exam: NORMAL EXTERNAL EXAM





- Extremities Exam


Extremities Exam: Full ROM, Normal Capillary Refill, Normal Inspection.  absent

: Joint Swelling, Pedal Edema





- Back Exam


Back Exam: NORMAL INSPECTION





- Neurological Exam


Neurological Exam: Alert, Awake, CN II-XII Intact, Normal Gait, Oriented x3





- Psychiatric Exam


Psychiatric exam: Normal Affect, Normal Mood





- Skin


Skin Exam: Dry, Intact, Normal Color, Warm





Assessment and Plan


(1) DVT prophylaxis


Status: Chronic





(2) CAD (coronary artery disease)


Status: Chronic





(3) Smoking


Status: Chronic





(4) Low back pain


Status: Chronic





(5) Scrotal edema


Status: Chronic





(6) Prediabetes


Status: Chronic





(7) Neurocognitive disorder


Status: Chronic





(8) Leg edema, right


Status: Acute








- Assessment and Plan (Free Text)


Assessment: 


(1) DVT prophylaxis


Assessment & Plan: 


scd and ae hose


ambulation


Status: Chronic





(2) CAD (coronary artery disease)


Assessment & Plan: 


cont meds


Status: Chronic





(3) Smoking


Assessment & Plan: 


nicoderm


Status: Chronic





(4) Low back pain


Assessment & Plan: 


ibuprofen prn


Status: Chronic





(5) Scrotal edema


Status: Chronic





(6) Prediabetes


Assessment & Plan: 


dietary


Status: Chronic





(7) Neurocognitive disorder


Assessment & Plan: 


cont med


spendign placement


Status: Chronic





uti macrboid x 10 days

## 2017-01-02 RX ADMIN — DIVALPROEX SODIUM SCH MG: 500 TABLET, DELAYED RELEASE ORAL at 08:29

## 2017-01-02 RX ADMIN — DIVALPROEX SODIUM SCH MG: 500 TABLET, DELAYED RELEASE ORAL at 17:12

## 2017-01-02 NOTE — CP.PCM.PN
Subjective





- Date & Time of Evaluation


Date of Evaluation: 01/02/17


Time of Evaluation: 07:18





- Subjective


Subjective: 


no complaints/distress


no f/c, n/v/d


pending palcement





Objective





- Vital Signs/Intake and Output


Vital Signs (last 24 hours): 


 











Temp Pulse Resp BP Pulse Ox


 


 97.7 F   73   20   105/59 L  97 


 


 01/01/17 22:12  01/01/17 22:12  01/01/17 22:12  01/01/17 22:12  01/01/17 22:12











- Medications


Medications: 


 Current Medications





Acetaminophen (Tylenol 325mg Tab)  650 mg PO Q6 PRN


   PRN Reason: Fever >100.4 F


   Last Admin: 12/22/16 19:54 Dose:  650 mg


Aspirin (Ecotrin)  81 mg PO DAILY [facility]


   Last Admin: 01/01/17 08:20 Dose:  81 mg


Atorvastatin Calcium (Lipitor)  20 mg PO HS [facility]


   Last Admin: 01/01/17 21:38 Dose:  20 mg


Divalproex Sodium (Depakote Dr(*Bid*))  500 mg PO BID [facility]


   Last Admin: 01/01/17 16:04 Dose:  500 mg


Enalapril Maleate (Vasotec)  10 mg PO DAILY [facility]


   Last Admin: 01/01/17 08:19 Dose:  10 mg


Famotidine (Pepcid)  20 mg PO BID [facility]


   Last Admin: 01/01/17 16:04 Dose:  20 mg


Ibuprofen (Motrin Tab)  600 mg PO Q8 PRN


   PRN Reason: Pain, moderate (4-7)


   Last Admin: 01/01/17 11:30 Dose:  600 mg


Metoprolol Tartrate (Lopressor)  50 mg PO Q12 [facility]


   Last Admin: 01/01/17 21:39 Dose:  50 mg


Nitrofurantoin Macrocrystals (Macrobid)  100 mg PO Q12 [facility]


   Last Admin: 01/01/17 21:39 Dose:  100 mg


Promethazine HCl/Dextromethorphan (Phenergan Dm Syrup)  5 ml PO Q6 PRN


   PRN Reason: Cough


   Last Admin: 12/22/16 08:54 Dose:  5 ml


Quetiapine Fumarate (Seroquel)  25 mg PO HS [facility]


   Last Admin: 01/01/17 21:39 Dose:  25 mg











- Labs


Labs: 


 





 12/21/16 08:02 





 12/21/16 08:02 





 











PT  11.2 SECONDS (9.4-12.0)   12/14/15  05:20    


 


INR  1.05  (0.89-1.20)   12/14/15  05:20    


 


APTT  36.2 SECONDS (23.1-32.0)  H  12/14/15  05:20    














- Constitutional


Appears: Well, Non-toxic, No Acute Distress





- Head Exam


Head Exam: ATRAUMATIC, NORMAL INSPECTION, NORMOCEPHALIC





- Eye Exam


Eye Exam: EOMI, Normal appearance, PERRL


Pupil Exam: NORMAL ACCOMODATION, PERRL





- ENT Exam


ENT Exam: Mucous Membranes Moist, Normal Exam





- Neck Exam


Neck Exam: Full ROM, Normal Inspection.  absent: Lymphadenopathy





- Respiratory Exam


Respiratory Exam: Clear to Ausculation Bilateral, NORMAL BREATHING PATTERN





- Cardiovascular Exam


Cardiovascular Exam: REGULAR RHYTHM, RRR, +S1, +S2.  absent: Murmur





- GI/Abdominal Exam


GI & Abdominal Exam: Soft, Normal Bowel Sounds.  absent: Tenderness





- Extremities Exam


Extremities Exam: Full ROM, Normal Capillary Refill, Normal Inspection.  absent

: Joint Swelling, Pedal Edema





- Back Exam


Back Exam: NORMAL INSPECTION





- Neurological Exam


Neurological Exam: Alert, Awake, CN II-XII Intact, Normal Gait, Oriented x3





- Psychiatric Exam


Psychiatric exam: Normal Affect, Normal Mood





- Skin


Skin Exam: Dry, Intact, Normal Color, Warm





Assessment and Plan


(1) DVT prophylaxis


Status: Chronic





(2) CAD (coronary artery disease)


Status: Chronic





(3) Smoking


Status: Chronic





(4) Low back pain


Status: Chronic





(5) Scrotal edema


Status: Chronic





(6) Prediabetes


Status: Chronic





(7) Neurocognitive disorder


Status: Chronic





(8) Leg edema, right


Status: Acute








- Assessment and Plan (Free Text)


Assessment: 


(1) DVT prophylaxis


Assessment & Plan: 


scd and ae hose


ambulation


Status: Chronic





(2) CAD (coronary artery disease)


Assessment & Plan: 


cont meds


Status: Chronic





(3) Smoking


Assessment & Plan: 


nicoderm


Status: Chronic





(4) Low back pain


Assessment & Plan: 


ibuprofen prn


Status: Chronic





(5) Scrotal edema


Status: Chronic





(6) Prediabetes


Assessment & Plan: 


dietary


Status: Chronic





(7) Neurocognitive disorder


Assessment & Plan: 


cont med


spendign placement


Status: Chronic





uti macrboid x 10 days

## 2017-01-03 RX ADMIN — DIVALPROEX SODIUM SCH MG: 500 TABLET, DELAYED RELEASE ORAL at 16:41

## 2017-01-03 RX ADMIN — DIVALPROEX SODIUM SCH MG: 500 TABLET, DELAYED RELEASE ORAL at 08:29

## 2017-01-03 NOTE — CP.PCM.PN
Subjective





- Date & Time of Evaluation


Date of Evaluation: 01/03/17


Time of Evaluation: 07:12





- Subjective


Subjective: 


no complaints/distress


no f/c, n/v/d


pendingplacement





Objective





- Vital Signs/Intake and Output


Vital Signs (last 24 hours): 


 











Temp Pulse Resp BP Pulse Ox


 


 98.1 F   76   20   123/80   98 


 


 01/02/17 21:41  01/02/17 21:41  01/02/17 21:41  01/02/17 21:41  01/02/17 21:41











- Medications


Medications: 


 Current Medications





Acetaminophen (Tylenol 325mg Tab)  650 mg PO Q6 PRN


   PRN Reason: Fever >100.4 F


   Last Admin: 12/22/16 19:54 Dose:  650 mg


Aspirin (Ecotrin)  81 mg PO DAILY [facility]


   Last Admin: 01/02/17 08:30 Dose:  81 mg


Atorvastatin Calcium (Lipitor)  20 mg PO HS [facility]


   Last Admin: 01/02/17 21:03 Dose:  20 mg


Divalproex Sodium (Depakote Dr(*Bid*))  500 mg PO BID [facility]


   Last Admin: 01/02/17 17:12 Dose:  500 mg


Enalapril Maleate (Vasotec)  10 mg PO DAILY [facility]


   Last Admin: 01/02/17 17:12 Dose:  10 mg


Famotidine (Pepcid)  20 mg PO BID [facility]


   Last Admin: 01/02/17 17:12 Dose:  20 mg


Ibuprofen (Motrin Tab)  600 mg PO Q8 PRN


   PRN Reason: Pain, moderate (4-7)


   Last Admin: 01/01/17 11:30 Dose:  600 mg


Metoprolol Tartrate (Lopressor)  50 mg PO Q12 [facility]


   Last Admin: 01/02/17 20:25 Dose:  50 mg


Nitrofurantoin Macrocrystals (Macrobid)  100 mg PO Q12 [facility]


   Last Admin: 01/02/17 20:24 Dose:  100 mg


Promethazine HCl/Dextromethorphan (Phenergan Dm Syrup)  5 ml PO Q6 PRN


   PRN Reason: Cough


   Last Admin: 12/22/16 08:54 Dose:  5 ml


Quetiapine Fumarate (Seroquel)  25 mg PO HS [facility]


   Last Admin: 01/02/17 21:03 Dose:  25 mg











- Labs


Labs: 


 





 12/21/16 08:02 





 12/21/16 08:02 





 











PT  11.2 SECONDS (9.4-12.0)   12/14/15  05:20    


 


INR  1.05  (0.89-1.20)   12/14/15  05:20    


 


APTT  36.2 SECONDS (23.1-32.0)  H  12/14/15  05:20    














- Constitutional


Appears: Well, Non-toxic, No Acute Distress





- Head Exam


Head Exam: ATRAUMATIC, NORMAL INSPECTION, NORMOCEPHALIC





- Eye Exam


Eye Exam: EOMI, Normal appearance, PERRL


Pupil Exam: NORMAL ACCOMODATION, PERRL





- ENT Exam


ENT Exam: Mucous Membranes Moist, Normal Exam





- Neck Exam


Neck Exam: Full ROM, Normal Inspection.  absent: Lymphadenopathy





- Respiratory Exam


Respiratory Exam: Clear to Ausculation Bilateral, NORMAL BREATHING PATTERN





- Cardiovascular Exam


Cardiovascular Exam: REGULAR RHYTHM, RRR, +S1, +S2.  absent: Murmur





- GI/Abdominal Exam


GI & Abdominal Exam: Soft, Normal Bowel Sounds.  absent: Tenderness





- Extremities Exam


Extremities Exam: Full ROM, Normal Capillary Refill, Normal Inspection.  absent

: Joint Swelling, Pedal Edema





- Back Exam


Back Exam: NORMAL INSPECTION





- Neurological Exam


Neurological Exam: Alert, Awake, CN II-XII Intact, Normal Gait, Oriented x3





- Psychiatric Exam


Psychiatric exam: Normal Affect, Normal Mood





- Skin


Skin Exam: Dry, Intact, Normal Color, Warm





Assessment and Plan


(1) DVT prophylaxis


Status: Chronic





(2) CAD (coronary artery disease)


Status: Chronic





(3) Smoking


Status: Chronic





(4) Low back pain


Status: Chronic





(5) Scrotal edema


Status: Chronic





(6) Prediabetes


Status: Chronic





(7) Neurocognitive disorder


Status: Chronic





(8) Leg edema, right


Status: Acute








- Assessment and Plan (Free Text)


Assessment: 


(1) DVT prophylaxis


Assessment & Plan: 


scd and ae hose


ambulation


Status: Chronic





(2) CAD (coronary artery disease)


Assessment & Plan: 


cont meds


Status: Chronic





(3) Smoking


Assessment & Plan: 


nicoderm


Status: Chronic





(4) Low back pain


Assessment & Plan: 


ibuprofen prn


Status: Chronic





(5) Scrotal edema


Status: Chronic





(6) Prediabetes


Assessment & Plan: 


dietary


Status: Chronic





(7) Neurocognitive disorder


Assessment & Plan: 


cont med


spendign placement


Status: Chronic





uti macrboid x 10 days

## 2017-01-04 RX ADMIN — DIVALPROEX SODIUM SCH MG: 500 TABLET, DELAYED RELEASE ORAL at 16:14

## 2017-01-04 RX ADMIN — DIVALPROEX SODIUM SCH MG: 500 TABLET, DELAYED RELEASE ORAL at 08:04

## 2017-01-04 NOTE — CP.PCM.PN
Subjective





- Date & Time of Evaluation


Date of Evaluation: 01/04/17


Time of Evaluation: 06:47





- Subjective


Subjective: 


no f/c, n/v/d


no dysuria-did 10 days macrobid


penidng palcement


no distress/complaints





Objective





- Vital Signs/Intake and Output


Vital Signs (last 24 hours): 


 











Temp Pulse Resp BP Pulse Ox


 


 96.8 F L  81   20   155/83 H  98 


 


 01/03/17 20:02  01/03/17 21:19  01/03/17 20:02  01/03/17 21:19  01/03/17 20:02











- Medications


Medications: 


 Current Medications





Acetaminophen (Tylenol 325mg Tab)  650 mg PO Q6 PRN


   PRN Reason: Fever >100.4 F


   Last Admin: 12/22/16 19:54 Dose:  650 mg


Aspirin (Ecotrin)  81 mg PO DAILY [facility]


   Last Admin: 01/03/17 08:29 Dose:  81 mg


Atorvastatin Calcium (Lipitor)  20 mg PO [facility]


   Last Admin: 01/03/17 21:18 Dose:  20 mg


Divalproex Sodium (Depakote Dr(*Bid*))  500 mg PO BID [facility]


   Last Admin: 01/03/17 16:41 Dose:  500 mg


Enalapril Maleate (Vasotec)  10 mg PO DAILY [facility]


   Last Admin: 01/03/17 08:30 Dose:  Not Given


Famotidine (Pepcid)  20 mg PO BID [facility]


   Last Admin: 01/03/17 16:42 Dose:  20 mg


Ibuprofen (Motrin Tab)  600 mg PO Q8 PRN


   PRN Reason: Pain, moderate (4-7)


   Last Admin: 01/01/17 11:30 Dose:  600 mg


Metoprolol Tartrate (Lopressor)  50 mg PO Q12 [facility]


   Last Admin: 01/03/17 21:19 Dose:  50 mg


Nitrofurantoin Macrocrystals (Macrobid)  100 mg PO Q12 [facility]


   Last Admin: 01/03/17 21:21 Dose:  100 mg


Quetiapine Fumarate (Seroquel)  25 mg PO HS [facility]


   Last Admin: 01/03/17 21:20 Dose:  25 mg











- Labs


Labs: 


 





 12/21/16 08:02 





 12/21/16 08:02 





 











PT  11.2 SECONDS (9.4-12.0)   12/14/15  05:20    


 


INR  1.05  (0.89-1.20)   12/14/15  05:20    


 


APTT  36.2 SECONDS (23.1-32.0)  H  12/14/15  05:20    














- Constitutional


Appears: Well, Non-toxic, No Acute Distress





- Head Exam


Head Exam: ATRAUMATIC, NORMAL INSPECTION, NORMOCEPHALIC





- Eye Exam


Eye Exam: EOMI, Normal appearance, PERRL


Pupil Exam: NORMAL ACCOMODATION, PERRL





- ENT Exam


ENT Exam: Mucous Membranes Moist, Normal Exam





- Neck Exam


Neck Exam: Full ROM, Normal Inspection.  absent: Lymphadenopathy





- Respiratory Exam


Respiratory Exam: Clear to Ausculation Bilateral, NORMAL BREATHING PATTERN





- Cardiovascular Exam


Cardiovascular Exam: REGULAR RHYTHM, +S1, +S2.  absent: Murmur





- GI/Abdominal Exam


GI & Abdominal Exam: Soft, Normal Bowel Sounds.  absent: Tenderness





- Extremities Exam


Extremities Exam: Full ROM, Normal Capillary Refill, Normal Inspection.  absent

: Joint Swelling, Pedal Edema





- Back Exam


Back Exam: NORMAL INSPECTION





- Neurological Exam


Neurological Exam: Alert, Awake, CN II-XII Intact, Normal Gait, Oriented x3





- Psychiatric Exam


Psychiatric exam: Normal Affect, Normal Mood





- Skin


Skin Exam: Dry, Intact, Normal Color, Warm





Assessment and Plan


(1) DVT prophylaxis


Status: Chronic





(2) CAD (coronary artery disease)


Status: Chronic





(3) Smoking


Status: Chronic





(4) Low back pain


Status: Chronic





(5) Scrotal edema


Status: Chronic





(6) Prediabetes


Status: Chronic





(7) Neurocognitive disorder


Status: Chronic





(8) Leg edema, right


Status: Acute








- Assessment and Plan (Free Text)


Assessment: 


(1) DVT prophylaxis


Assessment & Plan: 


scd and ae hose


ambulation


Status: Chronic





(2) CAD (coronary artery disease)


Assessment & Plan: 


cont meds


Status: Chronic





(3) Smoking


Assessment & Plan: 


nicoderm


Status: Chronic





(4) Low back pain


Assessment & Plan: 


ibuprofen prn


Status: Chronic





(5) Scrotal edema


Status: Chronic





(6) Prediabetes


Assessment & Plan: 


dietary


Status: Chronic





(7) Neurocognitive disorder


Assessment & Plan: 


cont med


spendign placement


Status: Chronic

## 2017-01-05 RX ADMIN — DIVALPROEX SODIUM SCH MG: 500 TABLET, DELAYED RELEASE ORAL at 08:58

## 2017-01-05 RX ADMIN — DIVALPROEX SODIUM SCH MG: 500 TABLET, DELAYED RELEASE ORAL at 19:37

## 2017-01-05 NOTE — CP.PCM.PN
Subjective





- Date & Time of Evaluation


Date of Evaluation: 01/05/17


Time of Evaluation: 07:27





- Subjective


Subjective: 


no complaints/distress


no f/c, n/v/d


pneding placement





Objective





- Vital Signs/Intake and Output


Vital Signs (last 24 hours): 


 











Temp Pulse Resp BP Pulse Ox


 


 98.2 F   70   20   135/78   96 


 


 01/04/17 21:32  01/04/17 21:32  01/04/17 21:32  01/04/17 21:32  01/04/17 21:32











- Medications


Medications: 


 Current Medications





Acetaminophen (Tylenol 325mg Tab)  650 mg PO Q6 PRN


   PRN Reason: Fever >100.4 F


   Last Admin: 12/22/16 19:54 Dose:  650 mg


Aspirin (Ecotrin)  81 mg PO DAILY [facility]


   Last Admin: 01/04/17 08:06 Dose:  81 mg


Atorvastatin Calcium (Lipitor)  20 mg PO HS [facility]


   Last Admin: 01/04/17 21:19 Dose:  20 mg


Divalproex Sodium (Depakote Dr(*Bid*))  500 mg PO BID [facility]


   Last Admin: 01/04/17 16:14 Dose:  500 mg


Enalapril Maleate (Vasotec)  10 mg PO DAILY [facility]


   Last Admin: 01/04/17 08:03 Dose:  10 mg


Famotidine (Pepcid)  20 mg PO BID [facility]


   Last Admin: 01/04/17 16:14 Dose:  20 mg


Ibuprofen (Motrin Tab)  600 mg PO Q8 PRN


   PRN Reason: Pain, moderate (4-7)


   Last Admin: 01/01/17 11:30 Dose:  600 mg


Metoprolol Tartrate (Lopressor)  50 mg PO Q12 [facility]


   Last Admin: 01/04/17 21:19 Dose:  50 mg


Quetiapine Fumarate (Seroquel)  25 mg PO HS [facility]


   Last Admin: 01/04/17 21:19 Dose:  25 mg











- Labs


Labs: 


 





 12/21/16 08:02 





 12/21/16 08:02 





 











PT  11.2 SECONDS (9.4-12.0)   12/14/15  05:20    


 


INR  1.05  (0.89-1.20)   12/14/15  05:20    


 


APTT  36.2 SECONDS (23.1-32.0)  H  12/14/15  05:20    














- Constitutional


Appears: Well, Non-toxic, No Acute Distress





- Head Exam


Head Exam: ATRAUMATIC, NORMAL INSPECTION, NORMOCEPHALIC





- Eye Exam


Eye Exam: EOMI, Normal appearance, PERRL


Pupil Exam: NORMAL ACCOMODATION, PERRL





- ENT Exam


ENT Exam: Mucous Membranes Moist, Normal Exam





- Neck Exam


Neck Exam: Full ROM, Normal Inspection.  absent: Lymphadenopathy





- Respiratory Exam


Respiratory Exam: Clear to Ausculation Bilateral, NORMAL BREATHING PATTERN





- Cardiovascular Exam


Cardiovascular Exam: REGULAR RHYTHM, RRR, +S1, +S2.  absent: Murmur





- GI/Abdominal Exam


GI & Abdominal Exam: Soft, Normal Bowel Sounds.  absent: Tenderness





- Extremities Exam


Extremities Exam: Full ROM, Normal Capillary Refill, Normal Inspection.  absent

: Joint Swelling, Pedal Edema





- Back Exam


Back Exam: NORMAL INSPECTION





- Neurological Exam


Neurological Exam: Alert, Awake, CN II-XII Intact, Normal Gait, Oriented x3





- Psychiatric Exam


Psychiatric exam: Normal Affect, Normal Mood





- Skin


Skin Exam: Dry, Intact, Normal Color, Warm





Assessment and Plan


(1) DVT prophylaxis


Status: Chronic





(2) CAD (coronary artery disease)


Status: Chronic





(3) Smoking


Status: Chronic





(4) Low back pain


Status: Chronic





(5) Scrotal edema


Status: Chronic





(6) Prediabetes


Status: Chronic





(7) Neurocognitive disorder


Status: Chronic





(8) Leg edema, right


Status: Acute








- Assessment and Plan (Free Text)


Assessment: 


(1) DVT prophylaxis


Assessment & Plan: 


scd and ae hose


ambulation


Status: Chronic





(2) CAD (coronary artery disease)


Assessment & Plan: 


cont meds


Status: Chronic





(3) Smoking


Assessment & Plan: 


nicoderm


Status: Chronic





(4) Low back pain


Assessment & Plan: 


ibuprofen prn


Status: Chronic





(5) Scrotal edema


Status: Chronic





(6) Prediabetes


Assessment & Plan: 


dietary


Status: Chronic





(7) Neurocognitive disorder


Assessment & Plan: 


cont med


pendign placement


Status: Chronic

## 2017-01-06 RX ADMIN — DIVALPROEX SODIUM SCH MG: 500 TABLET, DELAYED RELEASE ORAL at 16:27

## 2017-01-06 RX ADMIN — DIVALPROEX SODIUM SCH MG: 500 TABLET, DELAYED RELEASE ORAL at 09:14

## 2017-01-06 NOTE — CP.PCM.PN
Subjective





- Date & Time of Evaluation


Date of Evaluation: 01/06/17


Time of Evaluation: 09:11





- Subjective


Subjective: 


no complaints/distress


no f/c, n/v/d


pending placement





Objective





- Vital Signs/Intake and Output


Vital Signs (last 24 hours): 


 











Temp Pulse Resp BP Pulse Ox


 


 97.9 F   74   20   121/72   100 


 


 01/06/17 08:16  01/06/17 08:16  01/06/17 08:16  01/06/17 08:16  01/06/17 08:16











- Medications


Medications: 


 Current Medications





Acetaminophen (Tylenol 325mg Tab)  650 mg PO Q6 PRN


   PRN Reason: Fever >100.4 F


   Last Admin: 12/22/16 19:54 Dose:  650 mg


Aspirin (Ecotrin)  81 mg PO DAILY [facility]


   Last Admin: 01/05/17 08:58 Dose:  81 mg


Atorvastatin Calcium (Lipitor)  20 mg PO HS [facility]


   Last Admin: 01/05/17 21:10 Dose:  20 mg


Divalproex Sodium (Depakote Dr(*Bid*))  500 mg PO BID [facility]


   Last Admin: 01/05/17 19:37 Dose:  500 mg


Enalapril Maleate (Vasotec)  10 mg PO DAILY [facility]


   Last Admin: 01/05/17 09:00 Dose:  10 mg


Famotidine (Pepcid)  20 mg PO BID [facility]


   Last Admin: 01/05/17 19:37 Dose:  20 mg


Ibuprofen (Motrin Tab)  600 mg PO Q8 PRN


   PRN Reason: Pain, moderate (4-7)


   Last Admin: 01/01/17 11:30 Dose:  600 mg


Metoprolol Tartrate (Lopressor)  50 mg PO Q12 [facility]


   Last Admin: 01/05/17 21:10 Dose:  50 mg


Quetiapine Fumarate (Seroquel)  25 mg PO HS [facility]


   Last Admin: 01/05/17 21:10 Dose:  25 mg











- Labs


Labs: 


 





 12/21/16 08:02 





 12/21/16 08:02 





 











PT  11.2 SECONDS (9.4-12.0)   12/14/15  05:20    


 


INR  1.05  (0.89-1.20)   12/14/15  05:20    


 


APTT  36.2 SECONDS (23.1-32.0)  H  12/14/15  05:20    














- Constitutional


Appears: Well, Non-toxic, No Acute Distress





- Head Exam


Head Exam: ATRAUMATIC, NORMAL INSPECTION, NORMOCEPHALIC





- Eye Exam


Eye Exam: EOMI, Normal appearance, PERRL


Pupil Exam: NORMAL ACCOMODATION, PERRL





- ENT Exam


ENT Exam: Mucous Membranes Moist, Normal Exam





- Neck Exam


Neck Exam: Full ROM, Normal Inspection.  absent: Lymphadenopathy





- Respiratory Exam


Respiratory Exam: Clear to Ausculation Bilateral, NORMAL BREATHING PATTERN





- Cardiovascular Exam


Cardiovascular Exam: REGULAR RHYTHM, RRR, +S1, +S2.  absent: Murmur





- GI/Abdominal Exam


GI & Abdominal Exam: Soft, Normal Bowel Sounds.  absent: Tenderness





- Extremities Exam


Extremities Exam: Full ROM, Normal Capillary Refill, Normal Inspection.  absent

: Joint Swelling, Pedal Edema





- Back Exam


Back Exam: NORMAL INSPECTION





- Neurological Exam


Neurological Exam: Alert, Awake, CN II-XII Intact, Normal Gait, Oriented x3





- Psychiatric Exam


Psychiatric exam: Normal Affect, Normal Mood





- Skin


Skin Exam: Dry, Intact, Normal Color, Warm





Assessment and Plan


(1) DVT prophylaxis


Status: Chronic





(2) CAD (coronary artery disease)


Status: Chronic





(3) Smoking


Status: Chronic





(4) Low back pain


Status: Chronic





(5) Scrotal edema


Status: Chronic





(6) Prediabetes


Status: Chronic





(7) Neurocognitive disorder


Status: Chronic





(8) Leg edema, right


Status: Acute








- Assessment and Plan (Free Text)


Assessment: 


(1) DVT prophylaxis


Assessment & Plan: 


scd and ae hose


ambulation


Status: Chronic





(2) CAD (coronary artery disease)


Assessment & Plan: 


cont meds


Status: Chronic





(3) Smoking


Assessment & Plan: 


nicoderm


Status: Chronic





(4) Low back pain


Assessment & Plan: 


ibuprofen prn


Status: Chronic





(5) Scrotal edema


Status: Chronic





(6) Prediabetes


Assessment & Plan: 


dietary


Status: Chronic





(7) Neurocognitive disorder


Assessment & Plan: 


cont med


pendign placement


Status: Chronic

## 2017-01-07 RX ADMIN — DIVALPROEX SODIUM SCH MG: 500 TABLET, DELAYED RELEASE ORAL at 08:58

## 2017-01-07 RX ADMIN — DIVALPROEX SODIUM SCH MG: 500 TABLET, DELAYED RELEASE ORAL at 16:39

## 2017-01-07 NOTE — CP.PCM.PN
Subjective





- Date & Time of Evaluation


Date of Evaluation: 01/07/17


Time of Evaluation: 09:43





- Subjective


Subjective: 


pending placement


no f/,c n/v/d


no complaints/distressd





Objective





- Vital Signs/Intake and Output


Vital Signs (last 24 hours): 


 











Temp Pulse Resp BP Pulse Ox


 


 97.8 F   60   18   100/60   98 


 


 01/07/17 08:08  01/07/17 09:02  01/07/17 08:08  01/07/17 09:02  01/07/17 08:08











- Medications


Medications: 


 Current Medications





Acetaminophen (Tylenol 325mg Tab)  650 mg PO Q6 PRN


   PRN Reason: Fever >100.4 F


   Last Admin: 12/22/16 19:54 Dose:  650 mg


Aspirin (Ecotrin)  81 mg PO DAILY [facility]


   Last Admin: 01/07/17 08:59 Dose:  81 mg


Atorvastatin Calcium (Lipitor)  20 mg PO HS [facility]


   Last Admin: 01/06/17 21:08 Dose:  20 mg


Divalproex Sodium (Depakote Dr(*Bid*))  500 mg PO BID [facility]


   Last Admin: 01/07/17 08:58 Dose:  500 mg


Enalapril Maleate (Vasotec)  10 mg PO DAILY [facility]


   Last Admin: 01/07/17 09:00 Dose:  Not Given


Famotidine (Pepcid)  20 mg PO BID [facility]


   Last Admin: 01/07/17 09:00 Dose:  20 mg


Ibuprofen (Motrin Tab)  600 mg PO Q8 PRN


   PRN Reason: Pain, moderate (4-7)


   Last Admin: 01/06/17 16:32 Dose:  600 mg


Metoprolol Tartrate (Lopressor)  50 mg PO Q12 [facility]


   Last Admin: 01/07/17 09:02 Dose:  Not Given


Quetiapine Fumarate (Seroquel)  25 mg PO HS [facility]


   Last Admin: 01/06/17 21:08 Dose:  25 mg











- Labs


Labs: 


 





 12/21/16 08:02 





 12/21/16 08:02 





 











PT  11.2 SECONDS (9.4-12.0)   12/14/15  05:20    


 


INR  1.05  (0.89-1.20)   12/14/15  05:20    


 


APTT  36.2 SECONDS (23.1-32.0)  H  12/14/15  05:20    














- Constitutional


Appears: Well, Non-toxic, No Acute Distress





- Head Exam


Head Exam: ATRAUMATIC, NORMAL INSPECTION, NORMOCEPHALIC





- Eye Exam


Eye Exam: EOMI, Normal appearance, PERRL


Pupil Exam: NORMAL ACCOMODATION, PERRL





- ENT Exam


ENT Exam: Mucous Membranes Moist, Normal Exam





- Neck Exam


Neck Exam: Full ROM, Normal Inspection.  absent: Lymphadenopathy





- Respiratory Exam


Respiratory Exam: Clear to Ausculation Bilateral, NORMAL BREATHING PATTERN





- Cardiovascular Exam


Cardiovascular Exam: REGULAR RHYTHM, +S1, +S2.  absent: Murmur





- GI/Abdominal Exam


GI & Abdominal Exam: Soft, Normal Bowel Sounds.  absent: Tenderness





- Extremities Exam


Extremities Exam: Full ROM, Normal Capillary Refill, Normal Inspection.  absent

: Joint Swelling, Pedal Edema





- Back Exam


Back Exam: NORMAL INSPECTION





- Neurological Exam


Neurological Exam: Alert, Awake, CN II-XII Intact, Normal Gait, Oriented x3





- Psychiatric Exam


Psychiatric exam: Normal Affect, Normal Mood





- Skin


Skin Exam: Dry, Intact, Normal Color, Warm





Assessment and Plan


(1) DVT prophylaxis


Status: Chronic





(2) CAD (coronary artery disease)


Status: Chronic





(3) Smoking


Status: Chronic





(4) Low back pain


Status: Chronic





(5) Scrotal edema


Status: Chronic





(6) Prediabetes


Status: Chronic





(7) Neurocognitive disorder


Status: Chronic





(8) Leg edema, right


Status: Acute








- Assessment and Plan (Free Text)


Assessment: 


(1) DVT prophylaxis


Assessment & Plan: 


scd and ae hose


ambulation


Status: Chronic





(2) CAD (coronary artery disease)


Assessment & Plan: 


cont meds


Status: Chronic





(3) Smoking


Assessment & Plan: 


nicoderm


Status: Chronic





(4) Low back pain


Assessment & Plan: 


ibuprofen prn


Status: Chronic





(5) Scrotal edema


Status: Chronic





(6) Prediabetes


Assessment & Plan: 


dietary


Status: Chronic





(7) Neurocognitive disorder


Assessment & Plan: 


cont med


spendign placement


Status: Chronic

## 2017-01-08 RX ADMIN — DIVALPROEX SODIUM SCH MG: 500 TABLET, DELAYED RELEASE ORAL at 16:20

## 2017-01-08 RX ADMIN — DIVALPROEX SODIUM SCH MG: 500 TABLET, DELAYED RELEASE ORAL at 09:11

## 2017-01-08 NOTE — CP.PCM.PN
Subjective





- Date & Time of Evaluation


Date of Evaluation: 01/08/17


Time of Evaluation: 09:18





- Subjective


Subjective: 


dictated note


pending placement





Objective





- Vital Signs/Intake and Output


Vital Signs (last 24 hours): 


 











Temp Pulse Resp BP Pulse Ox


 


 97.9 F   68   20   108/67   98 


 


 01/08/17 08:16  01/08/17 09:10  01/08/17 08:16  01/08/17 08:16  01/08/17 08:16











- Medications


Medications: 


 Current Medications





Acetaminophen (Tylenol 325mg Tab)  650 mg PO Q6 PRN


   PRN Reason: Fever >100.4 F


   Last Admin: 12/22/16 19:54 Dose:  650 mg


Aspirin (Ecotrin)  81 mg PO DAILY Replaced by [facility]


   Last Admin: 01/08/17 09:11 Dose:  81 mg


Atorvastatin Calcium (Lipitor)  20 mg PO HS Replaced by [facility]


   Last Admin: 01/07/17 21:09 Dose:  20 mg


Divalproex Sodium (Depakote Dr(*Bid*))  500 mg PO BID Replaced by [facility]


   Last Admin: 01/08/17 09:11 Dose:  500 mg


Enalapril Maleate (Vasotec)  10 mg PO DAILY Replaced by [facility]


   Last Admin: 01/08/17 09:10 Dose:  10 mg


Famotidine (Pepcid)  20 mg PO BID Replaced by [facility]


   Last Admin: 01/08/17 09:10 Dose:  20 mg


Ibuprofen (Motrin Tab)  600 mg PO Q8 PRN


   PRN Reason: Pain, moderate (4-7)


   Last Admin: 01/06/17 16:32 Dose:  600 mg


Metoprolol Tartrate (Lopressor)  50 mg PO Q12 Replaced by [facility]


   Last Admin: 01/08/17 09:10 Dose:  50 mg


Quetiapine Fumarate (Seroquel)  25 mg PO HS Replaced by [facility]


   Last Admin: 01/07/17 21:09 Dose:  25 mg











- Labs


Labs: 


 





 12/21/16 08:02 





 12/21/16 08:02 





 











PT  11.2 SECONDS (9.4-12.0)   12/14/15  05:20    


 


INR  1.05  (0.89-1.20)   12/14/15  05:20    


 


APTT  36.2 SECONDS (23.1-32.0)  H  12/14/15  05:20    














Assessment and Plan


(1) DVT prophylaxis


Status: Chronic





(2) CAD (coronary artery disease)


Status: Chronic





(3) Smoking


Status: Chronic





(4) Low back pain


Status: Chronic





(5) Scrotal edema


Status: Chronic





(6) Prediabetes


Status: Chronic





(7) Neurocognitive disorder


Status: Chronic





(8) Leg edema, right


Status: Acute

## 2017-01-08 NOTE — PN
DATE: 01/08/2017



The patient evaluated in bed.  No complaints, no distress, pending placement.  
No fever, chills, nausea, vomiting or diarrhea.  The patient remains _soial 
hold.



REVIEW OF SYSTEMS:  Negative.



PHYSICAL EXAMINATION:

GENERAL:  Alert and oriented.

HEART:  Regular rate and rhythm.  No murmurs, rubs, or gallops.

LUNGS:  Clear in all fields bilaterally.

ABDOMEN:  Soft, nontender.  Bowel sounds x 4.

EXTREMITIES:  Distal PMS is intact.  Cap refill is brisk.



DIAGNOSES AND PLAN:

1.  Coronary artery disease, status post cardiac arrest.  Continue medicine.

2.  Neurocognitive disorder.  Continue medicines.  Pending placement.

3.  Deep venous thrombosis prophylaxis.  Sequential compression devices and 
antiembolism hose, ambulation.

4.  Chronic intermittent back pain.  Ibuprofen p.r.n.



We will continue to follow patient through to his discharge.





__________________________________________

Fan SILVER







cc:   



DD: 01/08/2017 10:19:46  [id number]

TT: 01/08/2017 10:33:22

Confirmation # [id number]

Dictation # [id number]

en

MTDD

## 2017-01-09 RX ADMIN — DIVALPROEX SODIUM SCH MG: 500 TABLET, DELAYED RELEASE ORAL at 17:34

## 2017-01-09 RX ADMIN — DIVALPROEX SODIUM SCH MG: 500 TABLET, DELAYED RELEASE ORAL at 10:06

## 2017-01-09 NOTE — CP.PCM.PN
Subjective





- Date & Time of Evaluation


Date of Evaluation: 01/09/17


Time of Evaluation: 07:13





- Subjective


Subjective: 


no complaints/distressno f/c, n/v/d


penidn gplacement





Objective





- Vital Signs/Intake and Output


Vital Signs (last 24 hours): 


 











Temp Pulse Resp BP Pulse Ox


 


 97.9 F   68   20   130/70   100 


 


 01/08/17 20:51  01/08/17 21:42  01/08/17 20:51  01/08/17 21:42  01/08/17 20:51











- Medications


Medications: 


 Current Medications





Acetaminophen (Tylenol 325mg Tab)  650 mg PO Q6 PRN


   PRN Reason: Fever >100.4 F


   Last Admin: 12/22/16 19:54 Dose:  650 mg


Aspirin (Ecotrin)  81 mg PO DAILY [facility]


   Last Admin: 01/08/17 09:11 Dose:  81 mg


Atorvastatin Calcium (Lipitor)  20 mg PO HS [facility]


   Last Admin: 01/08/17 21:42 Dose:  20 mg


Divalproex Sodium (Depakote Dr(*Bid*))  500 mg PO BID [facility]


   Last Admin: 01/08/17 16:20 Dose:  500 mg


Enalapril Maleate (Vasotec)  10 mg PO DAILY [facility]


   Last Admin: 01/08/17 09:10 Dose:  10 mg


Famotidine (Pepcid)  20 mg PO BID [facility]


   Last Admin: 01/08/17 16:20 Dose:  20 mg


Ibuprofen (Motrin Tab)  600 mg PO Q8 PRN


   PRN Reason: Pain, moderate (4-7)


   Last Admin: 01/06/17 16:32 Dose:  600 mg


Metoprolol Tartrate (Lopressor)  50 mg PO Q12 [facility]


   Last Admin: 01/08/17 21:42 Dose:  50 mg


Quetiapine Fumarate (Seroquel)  25 mg PO HS [facility]


   Last Admin: 01/08/17 21:42 Dose:  25 mg











- Labs


Labs: 


 





 12/21/16 08:02 





 12/21/16 08:02 





 











PT  11.2 SECONDS (9.4-12.0)   12/14/15  05:20    


 


INR  1.05  (0.89-1.20)   12/14/15  05:20    


 


APTT  36.2 SECONDS (23.1-32.0)  H  12/14/15  05:20    














- Constitutional


Appears: Well, Non-toxic, No Acute Distress





- Head Exam


Head Exam: ATRAUMATIC, NORMAL INSPECTION, NORMOCEPHALIC





- Eye Exam


Eye Exam: EOMI, Normal appearance, PERRL


Pupil Exam: NORMAL ACCOMODATION, PERRL





- ENT Exam


ENT Exam: Mucous Membranes Moist, Normal Exam





- Neck Exam


Neck Exam: Full ROM, Normal Inspection.  absent: Lymphadenopathy





- Respiratory Exam


Respiratory Exam: Clear to Ausculation Bilateral, NORMAL BREATHING PATTERN





- Cardiovascular Exam


Cardiovascular Exam: REGULAR RHYTHM, RRR, +S1, +S2.  absent: Murmur





- GI/Abdominal Exam


GI & Abdominal Exam: Soft, Normal Bowel Sounds.  absent: Tenderness





- Extremities Exam


Extremities Exam: Full ROM, Normal Capillary Refill, Normal Inspection.  absent

: Joint Swelling, Pedal Edema





- Back Exam


Back Exam: NORMAL INSPECTION





- Neurological Exam


Neurological Exam: Alert, Awake, CN II-XII Intact, Normal Gait, Oriented x3





- Psychiatric Exam


Psychiatric exam: Normal Affect, Normal Mood





- Skin


Skin Exam: Dry, Intact, Normal Color, Warm





Assessment and Plan


(1) DVT prophylaxis


Status: Chronic





(2) CAD (coronary artery disease)


Status: Chronic





(3) Smoking


Status: Chronic





(4) Low back pain


Status: Chronic





(5) Scrotal edema


Status: Chronic





(6) Prediabetes


Status: Chronic





(7) Neurocognitive disorder


Status: Chronic





(8) Leg edema, right


Status: Acute








- Assessment and Plan (Free Text)


Assessment: 


(1) DVT prophylaxis


Assessment & Plan: 


scd and ae hose


ambulation


Status: Chronic





(2) CAD (coronary artery disease)


Assessment & Plan: 


cont meds


Status: Chronic





(3) Smoking


Assessment & Plan: 


nicoderm


Status: Chronic





(4) Low back pain


Assessment & Plan: 


ibuprofen prn


Status: Chronic





(5) Scrotal edema


Status: Chronic





(6) Prediabetes


Assessment & Plan: 


dietary


Status: Chronic





(7) Neurocognitive disorder


Assessment & Plan: 


cont med


pendign placement


Status: Chronic

## 2017-01-10 RX ADMIN — DIVALPROEX SODIUM SCH MG: 500 TABLET, DELAYED RELEASE ORAL at 08:40

## 2017-01-10 RX ADMIN — DIVALPROEX SODIUM SCH MG: 500 TABLET, DELAYED RELEASE ORAL at 17:21

## 2017-01-10 NOTE — CP.PCM.PN
Subjective





- Date & Time of Evaluation


Date of Evaluation: 01/10/17


Time of Evaluation: 06:31





- Subjective


Subjective: 


no complaints/distress


no f/c, n/v/d


pending placement





Objective





- Vital Signs/Intake and Output


Vital Signs (last 24 hours): 


 











Temp Pulse Resp BP Pulse Ox


 


 98.2 F   69   20   145/81   98 


 


 01/09/17 19:44  01/09/17 21:30  01/09/17 19:44  01/09/17 21:30  01/09/17 19:44











- Medications


Medications: 


 Current Medications





Acetaminophen (Tylenol 325mg Tab)  650 mg PO Q6 PRN


   PRN Reason: Fever >100.4 F


   Last Admin: 12/22/16 19:54 Dose:  650 mg


Aspirin (Ecotrin)  81 mg PO DAILY [facility]


   Last Admin: 01/09/17 10:07 Dose:  81 mg


Atorvastatin Calcium (Lipitor)  20 mg PO HS [facility]


   Last Admin: 01/09/17 21:30 Dose:  20 mg


Divalproex Sodium (Depakote Dr(*Bid*))  500 mg PO BID [facility]


   Last Admin: 01/09/17 17:34 Dose:  500 mg


Enalapril Maleate (Vasotec)  10 mg PO DAILY [facility]


   Last Admin: 01/09/17 10:08 Dose:  Not Given


Famotidine (Pepcid)  20 mg PO BID [facility]


   Last Admin: 01/09/17 17:34 Dose:  20 mg


Ibuprofen (Motrin Tab)  600 mg PO Q8 PRN


   PRN Reason: Pain, moderate (4-7)


   Last Admin: 01/06/17 16:32 Dose:  600 mg


Metoprolol Tartrate (Lopressor)  50 mg PO Q12 [facility]


   Last Admin: 01/09/17 21:30 Dose:  50 mg


Quetiapine Fumarate (Seroquel)  25 mg PO HS [facility]


   Last Admin: 01/09/17 21:31 Dose:  25 mg











- Labs


Labs: 


 





 12/21/16 08:02 





 12/21/16 08:02 





 











PT  11.2 SECONDS (9.4-12.0)   12/14/15  05:20    


 


INR  1.05  (0.89-1.20)   12/14/15  05:20    


 


APTT  36.2 SECONDS (23.1-32.0)  H  12/14/15  05:20    














- Constitutional


Appears: Well, Non-toxic, No Acute Distress





- Head Exam


Head Exam: ATRAUMATIC, NORMAL INSPECTION, NORMOCEPHALIC





- Eye Exam


Eye Exam: EOMI, Normal appearance, PERRL


Pupil Exam: NORMAL ACCOMODATION, PERRL





- ENT Exam


ENT Exam: Mucous Membranes Moist, Normal Exam





- Neck Exam


Neck Exam: Full ROM, Normal Inspection.  absent: Lymphadenopathy





- Respiratory Exam


Respiratory Exam: Clear to Ausculation Bilateral, NORMAL BREATHING PATTERN





- Cardiovascular Exam


Cardiovascular Exam: REGULAR RHYTHM, RRR, +S1, +S2.  absent: Murmur





- GI/Abdominal Exam


GI & Abdominal Exam: Soft, Normal Bowel Sounds.  absent: Tenderness





- Extremities Exam


Extremities Exam: Full ROM, Normal Capillary Refill, Normal Inspection.  absent

: Joint Swelling, Pedal Edema





- Back Exam


Back Exam: NORMAL INSPECTION





- Neurological Exam


Neurological Exam: Alert, Awake, CN II-XII Intact, Normal Gait, Oriented x3





- Psychiatric Exam


Psychiatric exam: Normal Affect, Normal Mood





- Skin


Skin Exam: Dry, Intact, Normal Color, Warm





Assessment and Plan


(1) DVT prophylaxis


Status: Chronic





(2) CAD (coronary artery disease)


Status: Chronic





(3) Smoking


Status: Chronic





(4) Low back pain


Status: Chronic





(5) Scrotal edema


Status: Chronic





(6) Prediabetes


Status: Chronic





(7) Neurocognitive disorder


Status: Chronic





(8) Leg edema, right


Status: Acute








- Assessment and Plan (Free Text)


Assessment: 


(1) DVT prophylaxis


Assessment & Plan: 


scd and ae hose


ambulation


Status: Chronic





(2) CAD (coronary artery disease)


Assessment & Plan: 


cont meds


Status: Chronic





(3) Smoking


Assessment & Plan: 


nicoderm


Status: Chronic





(4) Low back pain


Assessment & Plan: 


ibuprofen prn


Status: Chronic





(5) Scrotal edema


Status: Chronic





(6) Prediabetes


Assessment & Plan: 


dietary


Status: Chronic





(7) Neurocognitive disorder


Assessment & Plan: 


cont med


pendign placement


Status: Chronic

## 2017-01-11 RX ADMIN — DIVALPROEX SODIUM SCH MG: 500 TABLET, DELAYED RELEASE ORAL at 17:35

## 2017-01-11 RX ADMIN — DIVALPROEX SODIUM SCH MG: 500 TABLET, DELAYED RELEASE ORAL at 10:45

## 2017-01-11 NOTE — CP.PCM.PN
Subjective





- Date & Time of Evaluation


Date of Evaluation: 01/11/17


Time of Evaluation: 07:09





- Subjective


Subjective: 


no complaints/distres


no f/c, n/v/d


pending placement





Objective





- Vital Signs/Intake and Output


Vital Signs (last 24 hours): 


 











Temp Pulse Resp BP Pulse Ox


 


 98.4 F   74   18   117/74   97 


 


 01/10/17 20:44  01/10/17 22:39  01/10/17 20:44  01/10/17 22:39  01/10/17 20:44











- Medications


Medications: 


 Current Medications





Acetaminophen (Tylenol 325mg Tab)  650 mg PO Q6 PRN


   PRN Reason: Fever >100.4 F


   Last Admin: 12/22/16 19:54 Dose:  650 mg


Aspirin (Ecotrin)  81 mg PO DAILY [facility]


   Last Admin: 01/10/17 08:41 Dose:  81 mg


Atorvastatin Calcium (Lipitor)  20 mg PO HS [facility]


   Last Admin: 01/10/17 22:39 Dose:  20 mg


Divalproex Sodium (Depakote Dr(*Bid*))  500 mg PO BID [facility]


   Last Admin: 01/10/17 17:21 Dose:  500 mg


Enalapril Maleate (Vasotec)  10 mg PO DAILY [facility]


   Last Admin: 01/10/17 08:41 Dose:  10 mg


Famotidine (Pepcid)  20 mg PO BID [facility]


   Last Admin: 01/10/17 17:20 Dose:  20 mg


Ibuprofen (Motrin Tab)  600 mg PO Q8 PRN


   PRN Reason: Pain, moderate (4-7)


   Last Admin: 01/06/17 16:32 Dose:  600 mg


Metoprolol Tartrate (Lopressor)  50 mg PO Q12 [facility]


   Last Admin: 01/10/17 22:39 Dose:  50 mg


Quetiapine Fumarate (Seroquel)  25 mg PO HS [facility]


   Last Admin: 01/10/17 22:39 Dose:  25 mg











- Labs


Labs: 


 





 12/21/16 08:02 





 12/21/16 08:02 





 











PT  11.2 SECONDS (9.4-12.0)   12/14/15  05:20    


 


INR  1.05  (0.89-1.20)   12/14/15  05:20    


 


APTT  36.2 SECONDS (23.1-32.0)  H  12/14/15  05:20    














- Constitutional


Appears: Well, Non-toxic, No Acute Distress





- Head Exam


Head Exam: ATRAUMATIC, NORMAL INSPECTION, NORMOCEPHALIC





- Eye Exam


Eye Exam: EOMI, Normal appearance, PERRL


Pupil Exam: NORMAL ACCOMODATION, PERRL





- ENT Exam


ENT Exam: Mucous Membranes Moist, Normal Exam





- Neck Exam


Neck Exam: Full ROM, Normal Inspection.  absent: Lymphadenopathy





- Respiratory Exam


Respiratory Exam: Clear to Ausculation Bilateral, NORMAL BREATHING PATTERN





- Cardiovascular Exam


Cardiovascular Exam: REGULAR RHYTHM, RRR, +S1, +S2.  absent: Murmur





- GI/Abdominal Exam


GI & Abdominal Exam: Soft, Normal Bowel Sounds.  absent: Tenderness





- Extremities Exam


Extremities Exam: Full ROM, Normal Capillary Refill, Normal Inspection.  absent

: Joint Swelling, Pedal Edema





- Back Exam


Back Exam: NORMAL INSPECTION





- Neurological Exam


Neurological Exam: Alert, Awake, CN II-XII Intact, Normal Gait, Oriented x3





- Psychiatric Exam


Psychiatric exam: Normal Affect, Normal Mood





- Skin


Skin Exam: Dry, Intact, Normal Color, Warm





Assessment and Plan


(1) DVT prophylaxis


Status: Chronic





(2) CAD (coronary artery disease)


Status: Chronic





(3) Smoking


Status: Chronic





(4) Low back pain


Status: Chronic





(5) Scrotal edema


Status: Chronic





(6) Prediabetes


Status: Chronic





(7) Neurocognitive disorder


Status: Chronic





(8) Leg edema, right


Status: Acute








- Assessment and Plan (Free Text)


Assessment: 


(1) DVT prophylaxis


Assessment & Plan: 


scd and ae hose


ambulation


Status: Chronic





(2) CAD (coronary artery disease)


Assessment & Plan: 


cont meds


Status: Chronic





(3) Smoking


Assessment & Plan: 


nicoderm


Status: Chronic





(4) Low back pain


Assessment & Plan: 


ibuprofen prn


Status: Chronic





(5) Scrotal edema


Status: Chronic





(6) Prediabetes


Assessment & Plan: 


dietary


Status: Chronic





(7) Neurocognitive disorder


Assessment & Plan: 


cont med


pendign placement


Status: Chronic

## 2017-01-12 RX ADMIN — DIVALPROEX SODIUM SCH MG: 500 TABLET, DELAYED RELEASE ORAL at 10:29

## 2017-01-12 RX ADMIN — DIVALPROEX SODIUM SCH MG: 500 TABLET, DELAYED RELEASE ORAL at 16:46

## 2017-01-12 NOTE — CP.PCM.PN
Subjective





- Date & Time of Evaluation


Date of Evaluation: 01/12/17


Time of Evaluation: 07:31





- Subjective


Subjective: 


no complaints offered


no f/c, n/v/d


pending placement





Objective





- Vital Signs/Intake and Output


Vital Signs (last 24 hours): 


 











Temp Pulse Resp BP Pulse Ox


 


 98.4 F   72   18   100/67   97 


 


 01/12/17 07:27  01/12/17 07:27  01/12/17 07:27  01/12/17 07:27  01/12/17 07:27











- Medications


Medications: 


 Current Medications





Acetaminophen (Tylenol 325mg Tab)  650 mg PO Q6 PRN


   PRN Reason: Fever >100.4 F


   Last Admin: 12/22/16 19:54 Dose:  650 mg


Aspirin (Ecotrin)  81 mg PO DAILY [facility]


   Last Admin: 01/11/17 10:46 Dose:  81 mg


Atorvastatin Calcium (Lipitor)  20 mg PO HS [facility]


   Last Admin: 01/11/17 21:44 Dose:  20 mg


Divalproex Sodium (Depakote Dr(*Bid*))  500 mg PO BID [facility]


   Last Admin: 01/11/17 17:35 Dose:  500 mg


Enalapril Maleate (Vasotec)  10 mg PO DAILY [facility]


   Last Admin: 01/11/17 10:47 Dose:  10 mg


Famotidine (Pepcid)  20 mg PO BID [facility]


   Last Admin: 01/11/17 17:35 Dose:  20 mg


Ibuprofen (Motrin Tab)  600 mg PO Q8 PRN


   PRN Reason: Pain, moderate (4-7)


   Last Admin: 01/06/17 16:32 Dose:  600 mg


Metoprolol Tartrate (Lopressor)  50 mg PO Q12 [facility]


   Last Admin: 01/11/17 21:41 Dose:  50 mg


Quetiapine Fumarate (Seroquel)  25 mg PO HS [facility]


   Last Admin: 01/11/17 21:41 Dose:  25 mg











- Labs


Labs: 


 





 12/21/16 08:02 





 12/21/16 08:02 





 











PT  11.2 SECONDS (9.4-12.0)   12/14/15  05:20    


 


INR  1.05  (0.89-1.20)   12/14/15  05:20    


 


APTT  36.2 SECONDS (23.1-32.0)  H  12/14/15  05:20    














- Constitutional


Appears: Well, Non-toxic, No Acute Distress





- Head Exam


Head Exam: ATRAUMATIC, NORMAL INSPECTION, NORMOCEPHALIC





- Eye Exam


Eye Exam: EOMI, Normal appearance, PERRL


Pupil Exam: NORMAL ACCOMODATION, PERRL





- ENT Exam


ENT Exam: Mucous Membranes Moist, Normal Exam





- Neck Exam


Neck Exam: Full ROM, Normal Inspection.  absent: Lymphadenopathy





- Respiratory Exam


Respiratory Exam: Clear to Ausculation Bilateral, NORMAL BREATHING PATTERN





- Cardiovascular Exam


Cardiovascular Exam: REGULAR RHYTHM, RRR, +S1, +S2.  absent: Murmur





- GI/Abdominal Exam


GI & Abdominal Exam: Soft.  absent: Tenderness





- Extremities Exam


Extremities Exam: Full ROM, Normal Capillary Refill, Normal Inspection.  absent

: Joint Swelling, Pedal Edema





- Back Exam


Back Exam: NORMAL INSPECTION





- Neurological Exam


Neurological Exam: Alert, Awake, CN II-XII Intact, Normal Gait, Oriented x3





- Psychiatric Exam


Psychiatric exam: Normal Affect, Normal Mood





- Skin


Skin Exam: Dry, Intact, Normal Color, Warm





Assessment and Plan


(1) DVT prophylaxis


Status: Chronic





(2) CAD (coronary artery disease)


Status: Chronic





(3) Smoking


Status: Chronic





(4) Low back pain


Status: Chronic





(5) Scrotal edema


Status: Chronic





(6) Prediabetes


Status: Chronic





(7) Neurocognitive disorder


Status: Chronic





(8) Leg edema, right


Status: Acute








- Assessment and Plan (Free Text)


Assessment: 


(1) DVT prophylaxis


Assessment & Plan: 


scd and ae hose


ambulation


Status: Chronic





(2) CAD (coronary artery disease)


Assessment & Plan: 


cont meds


Status: Chronic





(3) Smoking


Assessment & Plan: 


nicoderm


Status: Chronic





(4) Low back pain


Assessment & Plan: 


ibuprofen prn


Status: Chronic





(5) Scrotal edema


Status: Chronic





(6) Prediabetes


Assessment & Plan: 


dietary


Status: Chronic





(7) Neurocognitive disorder


Assessment & Plan: 


cont med


pendign placement


Status: Chronic

## 2017-01-13 RX ADMIN — DIVALPROEX SODIUM SCH MG: 500 TABLET, DELAYED RELEASE ORAL at 16:10

## 2017-01-13 RX ADMIN — DIVALPROEX SODIUM SCH MG: 500 TABLET, DELAYED RELEASE ORAL at 08:35

## 2017-01-13 NOTE — CP.PCM.PN
Subjective





- Date & Time of Evaluation


Date of Evaluation: 01/13/17


Time of Evaluation: 07:53





- Subjective


Subjective: 


no f/c, n/v/d


pending placement


no complaints/distress





Objective





- Vital Signs/Intake and Output


Vital Signs (last 24 hours): 


 











Temp Pulse Resp BP Pulse Ox


 


 98.8 F   80   20   126/80   96 


 


 01/12/17 20:46  01/12/17 21:39  01/12/17 20:46  01/12/17 21:39  01/12/17 20:46











- Medications


Medications: 


 Current Medications





Acetaminophen (Tylenol 325mg Tab)  650 mg PO Q6 PRN


   PRN Reason: Fever >100.4 F


   Last Admin: 12/22/16 19:54 Dose:  650 mg


Aspirin (Ecotrin)  81 mg PO DAILY [facility]


   Last Admin: 01/12/17 10:29 Dose:  81 mg


Atorvastatin Calcium (Lipitor)  20 mg PO HS [facility]


   Last Admin: 01/12/17 21:40 Dose:  20 mg


Divalproex Sodium (Depakote Dr(*Bid*))  500 mg PO BID [facility]


   Last Admin: 01/12/17 16:46 Dose:  500 mg


Enalapril Maleate (Vasotec)  10 mg PO DAILY [facility]


   Last Admin: 01/12/17 10:33 Dose:  Not Given


Famotidine (Pepcid)  20 mg PO BID [facility]


   Last Admin: 01/12/17 16:46 Dose:  20 mg


Ibuprofen (Motrin Tab)  600 mg PO Q8 PRN


   PRN Reason: Pain, moderate (4-7)


   Last Admin: 01/06/17 16:32 Dose:  600 mg


Metoprolol Tartrate (Lopressor)  50 mg PO Q12 [facility]


   Last Admin: 01/12/17 21:39 Dose:  50 mg


Quetiapine Fumarate (Seroquel)  25 mg PO HS [facility]


   Last Admin: 01/12/17 21:40 Dose:  25 mg











- Labs


Labs: 


 





 12/21/16 08:02 





 12/21/16 08:02 





 











PT  11.2 SECONDS (9.4-12.0)   12/14/15  05:20    


 


INR  1.05  (0.89-1.20)   12/14/15  05:20    


 


APTT  36.2 SECONDS (23.1-32.0)  H  12/14/15  05:20    














- Constitutional


Appears: Well, Non-toxic, No Acute Distress





- Head Exam


Head Exam: ATRAUMATIC, NORMAL INSPECTION, NORMOCEPHALIC





- Eye Exam


Eye Exam: EOMI, Normal appearance, PERRL


Pupil Exam: NORMAL ACCOMODATION, PERRL





- ENT Exam


ENT Exam: Mucous Membranes Moist, Normal Exam





- Neck Exam


Neck Exam: Full ROM, Normal Inspection.  absent: Lymphadenopathy





- Respiratory Exam


Respiratory Exam: Clear to Ausculation Bilateral, NORMAL BREATHING PATTERN





- Cardiovascular Exam


Cardiovascular Exam: REGULAR RHYTHM, RRR, +S1, +S2.  absent: Murmur





- GI/Abdominal Exam


GI & Abdominal Exam: Soft, Normal Bowel Sounds.  absent: Tenderness





- Extremities Exam


Extremities Exam: Full ROM, Normal Capillary Refill, Normal Inspection.  absent

: Joint Swelling, Pedal Edema





- Back Exam


Back Exam: NORMAL INSPECTION





- Neurological Exam


Neurological Exam: Alert, Awake, CN II-XII Intact, Normal Gait, Oriented x3





- Psychiatric Exam


Psychiatric exam: Normal Affect, Normal Mood





- Skin


Skin Exam: Dry, Intact, Normal Color, Warm





Assessment and Plan


(1) DVT prophylaxis


Status: Chronic





(2) CAD (coronary artery disease)


Status: Chronic





(3) Smoking


Status: Chronic





(4) Low back pain


Status: Chronic





(5) Scrotal edema


Status: Chronic





(6) Prediabetes


Status: Chronic





(7) Neurocognitive disorder


Status: Chronic





(8) Leg edema, right


Status: Acute








- Assessment and Plan (Free Text)


Assessment: 


(1) DVT prophylaxis


Assessment & Plan: 


scd and ae hose


ambulation


Status: Chronic





(2) CAD (coronary artery disease)


Assessment & Plan: 


cont meds


Status: Chronic





(3) Smoking


Assessment & Plan: 


nicoderm


Status: Chronic





(4) Low back pain


Assessment & Plan: 


ibuprofen prn


Status: Chronic





(5) Scrotal edema


Status: Chronic





(6) Prediabetes


Assessment & Plan: 


dietary


Status: Chronic





(7) Neurocognitive disorder


Assessment & Plan: 


cont med


pendign placement


Status: Chronic

## 2017-01-14 RX ADMIN — DIVALPROEX SODIUM SCH MG: 500 TABLET, DELAYED RELEASE ORAL at 09:02

## 2017-01-14 RX ADMIN — DIVALPROEX SODIUM SCH MG: 500 TABLET, DELAYED RELEASE ORAL at 16:50

## 2017-01-14 NOTE — CP.PCM.PN
Subjective





- Date & Time of Evaluation


Date of Evaluation: 01/14/17


Time of Evaluation: 07:02





- Subjective


Subjective: 


no complaints/distress


no f/c, n/v/d


pending placement





Objective





- Vital Signs/Intake and Output


Vital Signs (last 24 hours): 


 











Temp Pulse Resp BP Pulse Ox


 


 98.4 F   69   18   109/77   98 


 


 01/13/17 20:31  01/13/17 21:48  01/13/17 20:31  01/13/17 21:48  01/13/17 20:31











- Medications


Medications: 


 Current Medications





Acetaminophen (Tylenol 325mg Tab)  650 mg PO Q6 PRN


   PRN Reason: Fever >100.4 F


   Last Admin: 12/22/16 19:54 Dose:  650 mg


Aspirin (Ecotrin)  81 mg PO DAILY Counts include 234 beds at the [facility]


   Last Admin: 01/13/17 08:34 Dose:  81 mg


Atorvastatin Calcium (Lipitor)  20 mg PO HS Counts include 234 beds at the [facility]


   Last Admin: 01/13/17 21:49 Dose:  20 mg


Divalproex Sodium (Depakote Dr(*Bid*))  500 mg PO BID Counts include 234 beds at the [facility]


   Last Admin: 01/13/17 16:10 Dose:  500 mg


Enalapril Maleate (Vasotec)  10 mg PO DAILY Counts include 234 beds at the [facility]


   Last Admin: 01/13/17 08:35 Dose:  10 mg


Famotidine (Pepcid)  20 mg PO BID Counts include 234 beds at the [facility]


   Last Admin: 01/13/17 16:10 Dose:  20 mg


Ibuprofen (Motrin Tab)  600 mg PO Q8 PRN


   PRN Reason: Pain, moderate (4-7)


   Last Admin: 01/06/17 16:32 Dose:  600 mg


Metoprolol Tartrate (Lopressor)  50 mg PO Q12 Counts include 234 beds at the [facility]


   Last Admin: 01/13/17 21:48 Dose:  50 mg


Quetiapine Fumarate (Seroquel)  25 mg PO HS Counts include 234 beds at the [facility]


   Last Admin: 01/13/17 21:49 Dose:  25 mg











- Labs


Labs: 


 





 12/21/16 08:02 





 12/21/16 08:02 





 











PT  11.2 SECONDS (9.4-12.0)   12/14/15  05:20    


 


INR  1.05  (0.89-1.20)   12/14/15  05:20    


 


APTT  36.2 SECONDS (23.1-32.0)  H  12/14/15  05:20    














- Constitutional


Appears: Well, Non-toxic, No Acute Distress





- Head Exam


Head Exam: ATRAUMATIC, NORMAL INSPECTION, NORMOCEPHALIC





- Eye Exam


Eye Exam: EOMI, Normal appearance, PERRL


Pupil Exam: NORMAL ACCOMODATION, PERRL





- ENT Exam


ENT Exam: Mucous Membranes Moist, Normal Exam





- Neck Exam


Neck Exam: Full ROM, Normal Inspection.  absent: Lymphadenopathy





- Respiratory Exam


Respiratory Exam: Clear to Ausculation Bilateral, NORMAL BREATHING PATTERN





- Cardiovascular Exam


Cardiovascular Exam: REGULAR RHYTHM, RRR, +S1, +S2.  absent: Murmur





- GI/Abdominal Exam


GI & Abdominal Exam: Soft, Normal Bowel Sounds.  absent: Tenderness





- Extremities Exam


Extremities Exam: Full ROM, Normal Capillary Refill, Normal Inspection.  absent

: Joint Swelling, Pedal Edema





- Back Exam


Back Exam: NORMAL INSPECTION





- Neurological Exam


Neurological Exam: Alert, Awake, CN II-XII Intact, Normal Gait, Oriented x3





- Psychiatric Exam


Psychiatric exam: Normal Affect, Normal Mood





- Skin


Skin Exam: Dry, Intact, Normal Color, Warm





Assessment and Plan


(1) DVT prophylaxis


Status: Chronic





(2) CAD (coronary artery disease)


Status: Chronic





(3) Smoking


Status: Chronic





(4) Low back pain


Status: Chronic





(5) Scrotal edema


Status: Chronic





(6) Prediabetes


Status: Chronic





(7) Neurocognitive disorder


Status: Chronic





(8) Leg edema, right


Status: Acute








- Assessment and Plan (Free Text)


Assessment: 


(1) DVT prophylaxis


Assessment & Plan: 


scd and ae hose


ambulation


Status: Chronic





(2) CAD (coronary artery disease)


Assessment & Plan: 


cont meds


Status: Chronic





(3) Smoking


Assessment & Plan: 


nicoderm


Status: Chronic





(4) Low back pain


Assessment & Plan: 


ibuprofen prn


Status: Chronic





(5) Scrotal edema


Status: Chronic





(6) Prediabetes


Assessment & Plan: 


dietary


Status: Chronic





(7) Neurocognitive disorder


Assessment & Plan: 


cont med


pendign placement


Status: Chronic

## 2017-01-15 RX ADMIN — DIVALPROEX SODIUM SCH MG: 500 TABLET, DELAYED RELEASE ORAL at 17:04

## 2017-01-15 RX ADMIN — DIVALPROEX SODIUM SCH MG: 500 TABLET, DELAYED RELEASE ORAL at 08:43

## 2017-01-15 NOTE — CP.PCM.PN
Subjective





- Date & Time of Evaluation


Date of Evaluation: 01/15/17


Time of Evaluation: 07:50





- Subjective


Subjective: 


no complaints/distress. no f/c, n/v/d


pending palcement





Objective





- Vital Signs/Intake and Output


Vital Signs (last 24 hours): 


 











Temp Pulse Resp BP Pulse Ox


 


 98.7 F   74   18   106/68   97 


 


 01/14/17 23:00  01/14/17 23:00  01/14/17 23:00  01/14/17 23:00  01/14/17 23:00











- Medications


Medications: 


 Current Medications





Acetaminophen (Tylenol 325mg Tab)  650 mg PO Q6 PRN


   PRN Reason: Fever >100.4 F


   Last Admin: 12/22/16 19:54 Dose:  650 mg


Aspirin (Ecotrin)  81 mg PO DAILY [facility]


   Last Admin: 01/14/17 09:02 Dose:  81 mg


Atorvastatin Calcium (Lipitor)  20 mg PO HS [facility]


   Last Admin: 01/14/17 21:19 Dose:  20 mg


Divalproex Sodium (Depakote Dr(*Bid*))  500 mg PO BID [facility]


   Last Admin: 01/14/17 16:50 Dose:  500 mg


Enalapril Maleate (Vasotec)  10 mg PO DAILY [facility]


   Last Admin: 01/14/17 09:03 Dose:  10 mg


Famotidine (Pepcid)  20 mg PO BID [facility]


   Last Admin: 01/14/17 16:50 Dose:  20 mg


Ibuprofen (Motrin Tab)  600 mg PO Q8 PRN


   PRN Reason: Pain, moderate (4-7)


   Last Admin: 01/06/17 16:32 Dose:  600 mg


Metoprolol Tartrate (Lopressor)  50 mg PO Q12 [facility]


   Last Admin: 01/14/17 21:19 Dose:  50 mg


Quetiapine Fumarate (Seroquel)  25 mg PO HS [facility]


   Last Admin: 01/14/17 21:19 Dose:  25 mg











- Labs


Labs: 


 





 12/21/16 08:02 





 12/21/16 08:02 





 











PT  11.2 SECONDS (9.4-12.0)   12/14/15  05:20    


 


INR  1.05  (0.89-1.20)   12/14/15  05:20    


 


APTT  36.2 SECONDS (23.1-32.0)  H  12/14/15  05:20    














- Constitutional


Appears: Well, Non-toxic, No Acute Distress





- Head Exam


Head Exam: ATRAUMATIC, NORMAL INSPECTION, NORMOCEPHALIC





- Eye Exam


Eye Exam: EOMI, Normal appearance, PERRL


Pupil Exam: NORMAL ACCOMODATION, PERRL





- ENT Exam


ENT Exam: Mucous Membranes Moist, Normal Exam





- Neck Exam


Neck Exam: Full ROM, Normal Inspection.  absent: Lymphadenopathy





- Respiratory Exam


Respiratory Exam: Clear to Ausculation Bilateral, NORMAL BREATHING PATTERN





- Cardiovascular Exam


Cardiovascular Exam: REGULAR RHYTHM, RRR, +S1, +S2.  absent: Murmur





- GI/Abdominal Exam


GI & Abdominal Exam: Soft, Normal Bowel Sounds.  absent: Tenderness





- Extremities Exam


Extremities Exam: Full ROM, Normal Capillary Refill, Normal Inspection.  absent

: Joint Swelling, Pedal Edema





- Back Exam


Back Exam: NORMAL INSPECTION





- Neurological Exam


Neurological Exam: Alert, Awake, CN II-XII Intact, Normal Gait, Oriented x3





- Psychiatric Exam


Psychiatric exam: Normal Affect, Normal Mood





- Skin


Skin Exam: Dry, Intact, Normal Color, Warm





Assessment and Plan


(1) DVT prophylaxis


Status: Chronic





(2) CAD (coronary artery disease)


Status: Chronic





(3) Smoking


Status: Chronic





(4) Low back pain


Status: Chronic





(5) Scrotal edema


Status: Chronic





(6) Prediabetes


Status: Chronic





(7) Neurocognitive disorder


Status: Chronic





(8) Leg edema, right


Status: Acute








- Assessment and Plan (Free Text)


Assessment: 


(1) DVT prophylaxis


Assessment & Plan: 


scd and ae hose


ambulation


Status: Chronic





(2) CAD (coronary artery disease)


Assessment & Plan: 


cont meds


Status: Chronic





(3) Smoking


Assessment & Plan: 


nicoderm


Status: Chronic





(4) Low back pain


Assessment & Plan: 


ibuprofen prn


Status: Chronic





(5) Scrotal edema


Status: Chronic





(6) Prediabetes


Assessment & Plan: 


dietary


Status: Chronic





(7) Neurocognitive disorder


Assessment & Plan: 


cont med


pendign placement


Status: Chronic

## 2017-01-16 RX ADMIN — DIVALPROEX SODIUM SCH MG: 500 TABLET, DELAYED RELEASE ORAL at 10:21

## 2017-01-16 RX ADMIN — DIVALPROEX SODIUM SCH MG: 500 TABLET, DELAYED RELEASE ORAL at 16:28

## 2017-01-16 NOTE — CP.PCM.PN
Subjective





- Date & Time of Evaluation


Date of Evaluation: 01/16/17


Time of Evaluation: 07:19





- Subjective


Subjective: 


no complaints/distress


no f/c, n/v/d


penidng placement.





Objective





- Vital Signs/Intake and Output


Vital Signs (last 24 hours): 


 











Temp Pulse Resp BP Pulse Ox


 


 98.8 F   73   20   113/71   97 


 


 01/15/17 20:51  01/15/17 21:07  01/15/17 20:51  01/15/17 21:07  01/15/17 20:51











- Medications


Medications: 


 Current Medications





Acetaminophen (Tylenol 325mg Tab)  650 mg PO Q6 PRN


   PRN Reason: Fever >100.4 F


   Last Admin: 12/22/16 19:54 Dose:  650 mg


Aspirin (Ecotrin)  81 mg PO DAILY [facility]


   Last Admin: 01/15/17 08:46 Dose:  81 mg


Atorvastatin Calcium (Lipitor)  20 mg PO HS [facility]


   Last Admin: 01/15/17 21:07 Dose:  20 mg


Divalproex Sodium (Depakote Dr(*Bid*))  500 mg PO BID [facility]


   Last Admin: 01/15/17 17:04 Dose:  500 mg


Enalapril Maleate (Vasotec)  10 mg PO DAILY [facility]


   Last Admin: 01/15/17 08:47 Dose:  10 mg


Famotidine (Pepcid)  20 mg PO BID [facility]


   Last Admin: 01/15/17 17:04 Dose:  20 mg


Ibuprofen (Motrin Tab)  600 mg PO Q8 PRN


   PRN Reason: Pain, moderate (4-7)


   Last Admin: 01/06/17 16:32 Dose:  600 mg


Metoprolol Tartrate (Lopressor)  50 mg PO Q12 [facility]


   Last Admin: 01/15/17 21:07 Dose:  50 mg


Quetiapine Fumarate (Seroquel)  25 mg PO HS [facility]


   Last Admin: 01/15/17 21:07 Dose:  25 mg











- Labs


Labs: 


 





 12/21/16 08:02 





 12/21/16 08:02 





 











PT  11.2 SECONDS (9.4-12.0)   12/14/15  05:20    


 


INR  1.05  (0.89-1.20)   12/14/15  05:20    


 


APTT  36.2 SECONDS (23.1-32.0)  H  12/14/15  05:20    














- Constitutional


Appears: Well, Non-toxic, No Acute Distress





- Head Exam


Head Exam: ATRAUMATIC, NORMAL INSPECTION, NORMOCEPHALIC





- Eye Exam


Eye Exam: EOMI, Normal appearance, PERRL


Pupil Exam: NORMAL ACCOMODATION, PERRL





- ENT Exam


ENT Exam: Mucous Membranes Moist, Normal Exam





- Neck Exam


Neck Exam: Full ROM, Normal Inspection.  absent: Lymphadenopathy





- Respiratory Exam


Respiratory Exam: Clear to Ausculation Bilateral, NORMAL BREATHING PATTERN





- Cardiovascular Exam


Cardiovascular Exam: REGULAR RHYTHM, RRR, +S1, +S2.  absent: Murmur





- GI/Abdominal Exam


GI & Abdominal Exam: Soft, Normal Bowel Sounds.  absent: Tenderness





- Extremities Exam


Extremities Exam: Full ROM, Normal Capillary Refill, Normal Inspection.  absent

: Joint Swelling, Pedal Edema





- Back Exam


Back Exam: NORMAL INSPECTION





- Neurological Exam


Neurological Exam: Alert, Awake, CN II-XII Intact, Normal Gait, Oriented x3





- Psychiatric Exam


Psychiatric exam: Normal Affect, Normal Mood





- Skin


Skin Exam: Dry, Intact, Normal Color, Warm





Assessment and Plan


(1) DVT prophylaxis


Status: Chronic





(2) CAD (coronary artery disease)


Status: Chronic





(3) Smoking


Status: Chronic





(4) Low back pain


Status: Chronic





(5) Scrotal edema


Status: Chronic





(6) Prediabetes


Status: Chronic





(7) Neurocognitive disorder


Status: Chronic





(8) Leg edema, right


Status: Acute








- Assessment and Plan (Free Text)


Assessment: 


(1) DVT prophylaxis


Assessment & Plan: 


scd and ae hose


ambulation


Status: Chronic





(2) CAD (coronary artery disease)


Assessment & Plan: 


cont meds


Status: Chronic





(3) Smoking


Assessment & Plan: 


nicoderm


Status: Chronic





(4) Low back pain


Assessment & Plan: 


ibuprofen prn


Status: Chronic





(5) Scrotal edema


Status: Chronic





(6) Prediabetes


Assessment & Plan: 


dietary


Status: Chronic





(7) Neurocognitive disorder


Assessment & Plan: 


cont med


pendign placement


Status: Chronic

## 2017-01-17 RX ADMIN — DIVALPROEX SODIUM SCH MG: 500 TABLET, DELAYED RELEASE ORAL at 16:24

## 2017-01-17 RX ADMIN — DIVALPROEX SODIUM SCH MG: 500 TABLET, DELAYED RELEASE ORAL at 08:37

## 2017-01-17 NOTE — CP.PCM.PN
Subjective





- Date & Time of Evaluation


Date of Evaluation: 01/17/17


Time of Evaluation: 07:42





- Subjective


Subjective: 


no f/c, n/v/d


pending placement


no copmlaints/distress





Objective





- Vital Signs/Intake and Output


Vital Signs (last 24 hours): 


 











Temp Pulse Resp BP Pulse Ox


 


 98.6 F   86   20   153/87 H  99 


 


 01/16/17 20:34  01/16/17 21:10  01/16/17 20:34  01/16/17 21:10  01/16/17 20:34











- Medications


Medications: 


 Current Medications





Acetaminophen (Tylenol 325mg Tab)  650 mg PO Q6 PRN


   PRN Reason: Fever >100.4 F


   Last Admin: 12/22/16 19:54 Dose:  650 mg


Aspirin (Ecotrin)  81 mg PO DAILY [facility]


   Last Admin: 01/16/17 10:21 Dose:  81 mg


Atorvastatin Calcium (Lipitor)  20 mg PO HS [facility]


   Last Admin: 01/16/17 21:09 Dose:  20 mg


Divalproex Sodium (Depakote Dr(*Bid*))  500 mg PO BID [facility]


   Last Admin: 01/16/17 16:28 Dose:  500 mg


Enalapril Maleate (Vasotec)  10 mg PO DAILY [facility]


   Last Admin: 01/16/17 10:21 Dose:  10 mg


Famotidine (Pepcid)  20 mg PO BID [facility]


   Last Admin: 01/16/17 16:29 Dose:  20 mg


Ibuprofen (Motrin Tab)  600 mg PO Q8 PRN


   PRN Reason: Pain, moderate (4-7)


   Last Admin: 01/06/17 16:32 Dose:  600 mg


Metoprolol Tartrate (Lopressor)  50 mg PO Q12 [facility]


   Last Admin: 01/16/17 21:10 Dose:  50 mg


Quetiapine Fumarate (Seroquel)  25 mg PO HS [facility]


   Last Admin: 01/16/17 21:10 Dose:  25 mg











- Labs


Labs: 


 





 12/21/16 08:02 





 12/21/16 08:02 





 











PT  11.2 SECONDS (9.4-12.0)   12/14/15  05:20    


 


INR  1.05  (0.89-1.20)   12/14/15  05:20    


 


APTT  36.2 SECONDS (23.1-32.0)  H  12/14/15  05:20    














- Constitutional


Appears: Well, Non-toxic, No Acute Distress





- Head Exam


Head Exam: ATRAUMATIC, NORMAL INSPECTION, NORMOCEPHALIC





- Eye Exam


Eye Exam: EOMI, Normal appearance, PERRL


Pupil Exam: NORMAL ACCOMODATION, PERRL





- ENT Exam


ENT Exam: Mucous Membranes Moist, Normal Exam





- Neck Exam


Neck Exam: Full ROM, Normal Inspection.  absent: Lymphadenopathy





- Respiratory Exam


Respiratory Exam: Clear to Ausculation Bilateral, NORMAL BREATHING PATTERN





- Cardiovascular Exam


Cardiovascular Exam: REGULAR RHYTHM, RRR, +S1, +S2.  absent: Murmur





- GI/Abdominal Exam


GI & Abdominal Exam: Soft, Normal Bowel Sounds.  absent: Tenderness





- Extremities Exam


Extremities Exam: Full ROM, Normal Capillary Refill, Normal Inspection.  absent

: Joint Swelling, Pedal Edema





- Back Exam


Back Exam: NORMAL INSPECTION





- Neurological Exam


Neurological Exam: Alert, Awake, CN II-XII Intact, Normal Gait, Oriented x3





- Psychiatric Exam


Psychiatric exam: Normal Affect, Normal Mood





- Skin


Skin Exam: Dry, Intact, Normal Color, Warm





Assessment and Plan


(1) DVT prophylaxis


Status: Chronic





(2) CAD (coronary artery disease)


Status: Chronic





(3) Smoking


Status: Chronic





(4) Low back pain


Status: Chronic





(5) Scrotal edema


Status: Chronic





(6) Prediabetes


Status: Chronic





(7) Neurocognitive disorder


Status: Chronic





(8) Leg edema, right


Status: Acute








- Assessment and Plan (Free Text)


Assessment: 


(1) DVT prophylaxis


Assessment & Plan: 


scd and ae hose


ambulation


Status: Chronic





(2) CAD (coronary artery disease)


Assessment & Plan: 


cont meds


Status: Chronic





(3) Smoking


Assessment & Plan: 


nicoderm


Status: Chronic





(4) Low back pain


Assessment & Plan: 


ibuprofen prn


Status: Chronic





(5) Scrotal edema


Status: Chronic





(6) Prediabetes


Assessment & Plan: 


dietary


Status: Chronic





(7) Neurocognitive disorder


Assessment & Plan: 


cont med


pendign placement


Status: Chronic

## 2017-01-18 RX ADMIN — DIVALPROEX SODIUM SCH MG: 500 TABLET, DELAYED RELEASE ORAL at 08:30

## 2017-01-18 RX ADMIN — DIVALPROEX SODIUM SCH MG: 500 TABLET, DELAYED RELEASE ORAL at 16:05

## 2017-01-18 NOTE — CP.PCM.PN
Subjective





- Date & Time of Evaluation


Date of Evaluation: 01/18/17


Time of Evaluation: 07:24





- Subjective


Subjective: 


no f/c, n/v/d


pending placement


no complaints/distress





Objective





- Vital Signs/Intake and Output


Vital Signs (last 24 hours): 


 











Temp Pulse Resp BP Pulse Ox


 


 98 F   76   18   105/75   98 


 


 01/17/17 20:38  01/17/17 20:48  01/17/17 20:38  01/17/17 20:48  01/17/17 20:38











- Medications


Medications: 


 Current Medications





Acetaminophen (Tylenol 325mg Tab)  650 mg PO Q6 PRN


   PRN Reason: Fever >100.4 F


   Last Admin: 12/22/16 19:54 Dose:  650 mg


Aspirin (Ecotrin)  81 mg PO DAILY [facility]


   Last Admin: 01/17/17 08:37 Dose:  81 mg


Atorvastatin Calcium (Lipitor)  20 mg PO HS [facility]


   Last Admin: 01/17/17 21:31 Dose:  20 mg


Divalproex Sodium (Depakote Dr(*Bid*))  500 mg PO BID [facility]


   Last Admin: 01/17/17 16:24 Dose:  500 mg


Enalapril Maleate (Vasotec)  10 mg PO DAILY [facility]


   Last Admin: 01/17/17 08:46 Dose:  10 mg


Famotidine (Pepcid)  20 mg PO BID [facility]


   Last Admin: 01/17/17 16:24 Dose:  20 mg


Ibuprofen (Motrin Tab)  600 mg PO Q8 PRN


   PRN Reason: Pain, moderate (4-7)


   Last Admin: 01/06/17 16:32 Dose:  600 mg


Metoprolol Tartrate (Lopressor)  50 mg PO Q12 [facility]


   Last Admin: 01/17/17 20:48 Dose:  50 mg


Quetiapine Fumarate (Seroquel)  25 mg PO HS [facility]


   Last Admin: 01/17/17 21:30 Dose:  25 mg











- Labs


Labs: 


 





 12/21/16 08:02 





 12/21/16 08:02 





 











PT  11.2 SECONDS (9.4-12.0)   12/14/15  05:20    


 


INR  1.05  (0.89-1.20)   12/14/15  05:20    


 


APTT  36.2 SECONDS (23.1-32.0)  H  12/14/15  05:20    














- Constitutional


Appears: Well, Non-toxic, No Acute Distress





- Head Exam


Head Exam: ATRAUMATIC, NORMAL INSPECTION, NORMOCEPHALIC





- Eye Exam


Eye Exam: EOMI, Normal appearance, PERRL


Pupil Exam: NORMAL ACCOMODATION, PERRL





- ENT Exam


ENT Exam: Mucous Membranes Moist, Normal Exam





- Neck Exam


Neck Exam: Full ROM, Normal Inspection.  absent: Lymphadenopathy





- Respiratory Exam


Respiratory Exam: Clear to Ausculation Bilateral, NORMAL BREATHING PATTERN





- Cardiovascular Exam


Cardiovascular Exam: REGULAR RHYTHM, RRR, +S1, +S2.  absent: Murmur





- GI/Abdominal Exam


GI & Abdominal Exam: Soft, Normal Bowel Sounds.  absent: Tenderness





-  Exam


Bimanual exam: NORMAL BIMANUAL EXAM





- Extremities Exam


Extremities Exam: Full ROM, Normal Capillary Refill, Normal Inspection.  absent

: Joint Swelling, Pedal Edema





- Back Exam


Back Exam: NORMAL INSPECTION





- Neurological Exam


Neurological Exam: Alert, Awake, CN II-XII Intact, Normal Gait, Oriented x3





- Psychiatric Exam


Psychiatric exam: Normal Affect, Normal Mood





- Skin


Skin Exam: Dry, Intact, Normal Color, Warm





Assessment and Plan


(1) DVT prophylaxis


Status: Chronic





(2) CAD (coronary artery disease)


Status: Chronic





(3) Smoking


Status: Chronic





(4) Low back pain


Status: Chronic





(5) Scrotal edema


Status: Chronic





(6) Prediabetes


Status: Chronic





(7) Neurocognitive disorder


Status: Chronic





(8) Leg edema, right


Status: Acute








- Assessment and Plan (Free Text)


Assessment: 


(1) DVT prophylaxis


Assessment & Plan: 


scd and ae hose


ambulation


Status: Chronic





(2) CAD (coronary artery disease)


Assessment & Plan: 


cont meds


Status: Chronic





(3) Smoking


Assessment & Plan: 


nicoderm


Status: Chronic





(4) Low back pain


Assessment & Plan: 


ibuprofen prn


Status: Chronic





(5) Scrotal edema


Status: Chronic





(6) Prediabetes


Assessment & Plan: 


dietary


Status: Chronic





(7) Neurocognitive disorder


Assessment & Plan: 


cont med


pendign placement


Status: Chronic

## 2017-01-19 RX ADMIN — DIVALPROEX SODIUM SCH MG: 500 TABLET, DELAYED RELEASE ORAL at 16:10

## 2017-01-19 RX ADMIN — DIVALPROEX SODIUM SCH MG: 500 TABLET, DELAYED RELEASE ORAL at 08:26

## 2017-01-19 NOTE — CP.PCM.PN
Subjective





- Date & Time of Evaluation


Date of Evaluation: 01/19/17


Time of Evaluation: 07:10





- Subjective


Subjective: 


no complaints/distress


no f/c, n/v/d


pending palcement





Objective





- Vital Signs/Intake and Output


Vital Signs (last 24 hours): 


 











Temp Pulse Resp BP Pulse Ox


 


 98.4 F   83   99 H  126/80   99 


 


 01/18/17 20:00  01/18/17 21:12  01/18/17 20:00  01/18/17 21:12  01/18/17 20:00











- Medications


Medications: 


 Current Medications





Acetaminophen (Tylenol 325mg Tab)  650 mg PO Q6 PRN


   PRN Reason: Fever >100.4 F


   Last Admin: 12/22/16 19:54 Dose:  650 mg


Aspirin (Ecotrin)  81 mg PO DAILY [facility]


   Last Admin: 01/18/17 08:29 Dose:  81 mg


Atorvastatin Calcium (Lipitor)  20 mg PO HS [facility]


   Last Admin: 01/18/17 21:13 Dose:  20 mg


Divalproex Sodium (Depakote Dr(*Bid*))  500 mg PO BID [facility]


   Last Admin: 01/18/17 16:05 Dose:  500 mg


Enalapril Maleate (Vasotec)  10 mg PO DAILY [facility]


   Last Admin: 01/18/17 08:30 Dose:  10 mg


Famotidine (Pepcid)  20 mg PO BID [facility]


   Last Admin: 01/18/17 16:05 Dose:  20 mg


Ibuprofen (Motrin Tab)  600 mg PO Q8 PRN


   PRN Reason: Pain, moderate (4-7)


   Last Admin: 01/06/17 16:32 Dose:  600 mg


Metoprolol Tartrate (Lopressor)  50 mg PO Q12 [facility]


   Last Admin: 01/18/17 21:12 Dose:  50 mg


Quetiapine Fumarate (Seroquel)  25 mg PO HS [facility]


   Last Admin: 01/18/17 21:13 Dose:  25 mg











- Labs


Labs: 


 





 12/21/16 08:02 





 12/21/16 08:02 





 











PT  11.2 SECONDS (9.4-12.0)   12/14/15  05:20    


 


INR  1.05  (0.89-1.20)   12/14/15  05:20    


 


APTT  36.2 SECONDS (23.1-32.0)  H  12/14/15  05:20    














- Constitutional


Appears: Well, Non-toxic, No Acute Distress





- Head Exam


Head Exam: ATRAUMATIC, NORMAL INSPECTION, NORMOCEPHALIC





- Eye Exam


Eye Exam: EOMI, Normal appearance, PERRL


Pupil Exam: NORMAL ACCOMODATION, PERRL





- ENT Exam


ENT Exam: Mucous Membranes Moist, Normal Exam





- Neck Exam


Neck Exam: Full ROM, Normal Inspection.  absent: Lymphadenopathy





- Respiratory Exam


Respiratory Exam: Clear to Ausculation Bilateral, NORMAL BREATHING PATTERN





- Cardiovascular Exam


Cardiovascular Exam: REGULAR RHYTHM, RRR, +S1, +S2.  absent: Murmur





- GI/Abdominal Exam


GI & Abdominal Exam: Soft, Normal Bowel Sounds.  absent: Tenderness





- Extremities Exam


Extremities Exam: Full ROM, Normal Capillary Refill, Normal Inspection.  absent

: Joint Swelling, Pedal Edema





- Back Exam


Back Exam: NORMAL INSPECTION





- Neurological Exam


Neurological Exam: Alert, Awake, CN II-XII Intact, Normal Gait, Oriented x3





- Psychiatric Exam


Psychiatric exam: Normal Affect, Normal Mood





- Skin


Skin Exam: Dry, Intact, Normal Color, Warm





Assessment and Plan


(1) DVT prophylaxis


Status: Chronic





(2) CAD (coronary artery disease)


Status: Chronic





(3) Smoking


Status: Chronic





(4) Low back pain


Status: Chronic





(5) Scrotal edema


Status: Chronic





(6) Prediabetes


Status: Chronic





(7) Neurocognitive disorder


Status: Chronic





(8) Leg edema, right


Status: Acute








- Assessment and Plan (Free Text)


Assessment: 


(1) DVT prophylaxis


Assessment & Plan: 


scd and ae hose


ambulation


Status: Chronic





(2) CAD (coronary artery disease)


Assessment & Plan: 


cont meds


Status: Chronic





(3) Smoking


Assessment & Plan: 


nicoderm


Status: Chronic





(4) Low back pain


Assessment & Plan: 


ibuprofen prn


Status: Chronic





(5) Scrotal edema


Status: Chronic





(6) Prediabetes


Assessment & Plan: 


dietary


Status: Chronic





(7) Neurocognitive disorder


Assessment & Plan: 


cont med


pendign placement


Status: Chronic

## 2017-01-20 RX ADMIN — DIVALPROEX SODIUM SCH MG: 500 TABLET, DELAYED RELEASE ORAL at 16:11

## 2017-01-20 RX ADMIN — DIVALPROEX SODIUM SCH MG: 500 TABLET, DELAYED RELEASE ORAL at 09:05

## 2017-01-20 NOTE — CP.PCM.PN
Subjective





- Date & Time of Evaluation


Date of Evaluation: 01/20/17


Time of Evaluation: 07:22





- Subjective


Subjective: 


no complaints/distress


no f/c, n/v/d


pending placement





Objective





- Vital Signs/Intake and Output


Vital Signs (last 24 hours): 


 











Temp Pulse Resp BP Pulse Ox


 


 98.5 F   72   18   114/75   98 


 


 01/19/17 21:31  01/19/17 21:31  01/19/17 21:31  01/19/17 21:31  01/19/17 21:31











- Medications


Medications: 


 Current Medications





Acetaminophen (Tylenol 325mg Tab)  650 mg PO Q6 PRN


   PRN Reason: Fever >100.4 F


   Last Admin: 12/22/16 19:54 Dose:  650 mg


Aspirin (Ecotrin)  81 mg PO DAILY [facility]


   Last Admin: 01/19/17 08:26 Dose:  81 mg


Atorvastatin Calcium (Lipitor)  20 mg PO HS [facility]


   Last Admin: 01/19/17 21:28 Dose:  20 mg


Divalproex Sodium (Depakote Dr(*Bid*))  500 mg PO BID [facility]


   Last Admin: 01/19/17 16:10 Dose:  500 mg


Enalapril Maleate (Vasotec)  10 mg PO DAILY [facility]


   Last Admin: 01/19/17 08:26 Dose:  10 mg


Famotidine (Pepcid)  20 mg PO BID [facility]


   Last Admin: 01/19/17 16:10 Dose:  20 mg


Ibuprofen (Motrin Tab)  600 mg PO Q8 PRN


   PRN Reason: Pain, moderate (4-7)


   Last Admin: 01/06/17 16:32 Dose:  600 mg


Metoprolol Tartrate (Lopressor)  50 mg PO Q12 [facility]


   Last Admin: 01/19/17 21:25 Dose:  50 mg


Quetiapine Fumarate (Seroquel)  25 mg PO HS [facility]


   Last Admin: 01/19/17 21:28 Dose:  25 mg











- Labs


Labs: 


 





 12/21/16 08:02 





 12/21/16 08:02 





 











PT  11.2 SECONDS (9.4-12.0)   12/14/15  05:20    


 


INR  1.05  (0.89-1.20)   12/14/15  05:20    


 


APTT  36.2 SECONDS (23.1-32.0)  H  12/14/15  05:20    














- Constitutional


Appears: Well, Non-toxic, No Acute Distress





- Head Exam


Head Exam: ATRAUMATIC, NORMAL INSPECTION, NORMOCEPHALIC





- Eye Exam


Eye Exam: EOMI, Normal appearance, PERRL


Pupil Exam: NORMAL ACCOMODATION, PERRL





- ENT Exam


ENT Exam: Mucous Membranes Moist, Normal Exam





- Neck Exam


Neck Exam: Full ROM, Normal Inspection.  absent: Lymphadenopathy





- Respiratory Exam


Respiratory Exam: Clear to Ausculation Bilateral, NORMAL BREATHING PATTERN





- Cardiovascular Exam


Cardiovascular Exam: REGULAR RHYTHM, RRR, +S1, +S2.  absent: Murmur





- GI/Abdominal Exam


GI & Abdominal Exam: Soft, Normal Bowel Sounds.  absent: Tenderness





- Extremities Exam


Extremities Exam: Full ROM, Normal Capillary Refill, Normal Inspection.  absent

: Joint Swelling, Pedal Edema





- Back Exam


Back Exam: NORMAL INSPECTION





- Neurological Exam


Neurological Exam: Alert, Awake, CN II-XII Intact, Normal Gait, Oriented x3





- Psychiatric Exam


Psychiatric exam: Normal Affect, Normal Mood





- Skin


Skin Exam: Dry, Intact, Normal Color, Warm





Assessment and Plan


(1) DVT prophylaxis


Status: Chronic





(2) CAD (coronary artery disease)


Status: Chronic





(3) Smoking


Status: Chronic





(4) Low back pain


Status: Chronic





(5) Scrotal edema


Status: Chronic





(6) Prediabetes


Status: Chronic





(7) Neurocognitive disorder


Status: Chronic





(8) Leg edema, right


Status: Acute








- Assessment and Plan (Free Text)


Assessment: 


(1) DVT prophylaxis


Assessment & Plan: 


scd and ae hose


ambulation


Status: Chronic





(2) CAD (coronary artery disease)


Assessment & Plan: 


cont meds


Status: Chronic





(3) Smoking


Assessment & Plan: 


nicoderm


Status: Chronic





(4) Low back pain


Assessment & Plan: 


ibuprofen prn


Status: Chronic





(5) Scrotal edema


Status: Chronic





(6) Prediabetes


Assessment & Plan: 


dietary


Status: Chronic





(7) Neurocognitive disorder


Assessment & Plan: 


cont med


pendign placement


Status: Chronic

## 2017-01-21 RX ADMIN — DIVALPROEX SODIUM SCH MG: 500 TABLET, DELAYED RELEASE ORAL at 16:28

## 2017-01-21 RX ADMIN — DIVALPROEX SODIUM SCH MG: 500 TABLET, DELAYED RELEASE ORAL at 08:28

## 2017-01-21 NOTE — CP.PCM.PN
Subjective





- Date & Time of Evaluation


Date of Evaluation: 01/21/17


Time of Evaluation: 16:26





- Subjective


Subjective: 


pending placement, no f/c, n/v/d


no complaints/distress





Objective





- Vital Signs/Intake and Output


Vital Signs (last 24 hours): 


 











Temp Pulse Resp BP Pulse Ox


 


 97.8 F   68   20   106/70   96 


 


 01/21/17 08:02  01/21/17 08:02  01/21/17 08:02  01/21/17 08:02  01/21/17 08:02











- Medications


Medications: 


 Current Medications





Acetaminophen (Tylenol 325mg Tab)  650 mg PO Q6 PRN


   PRN Reason: Fever >100.4 F


   Last Admin: 12/22/16 19:54 Dose:  650 mg


Aspirin (Ecotrin)  81 mg PO DAILY [facility]


   Last Admin: 01/21/17 08:28 Dose:  81 mg


Atorvastatin Calcium (Lipitor)  20 mg PO HS [facility]


   Last Admin: 01/20/17 21:43 Dose:  20 mg


Divalproex Sodium (Depakote Dr(*Bid*))  500 mg PO BID [facility]


   Last Admin: 01/21/17 08:28 Dose:  500 mg


Enalapril Maleate (Vasotec)  10 mg PO DAILY [facility]


   Last Admin: 01/21/17 08:29 Dose:  10 mg


Famotidine (Pepcid)  20 mg PO BID [facility]


   Last Admin: 01/21/17 08:29 Dose:  20 mg


Ibuprofen (Motrin Tab)  600 mg PO Q8 PRN


   PRN Reason: Pain, moderate (4-7)


   Last Admin: 01/06/17 16:32 Dose:  600 mg


Metoprolol Tartrate (Lopressor)  50 mg PO Q12 [facility]


   Last Admin: 01/21/17 08:29 Dose:  50 mg


Quetiapine Fumarate (Seroquel)  25 mg PO HS [facility]


   Last Admin: 01/20/17 21:43 Dose:  25 mg











- Labs


Labs: 


 





 12/21/16 08:02 





 12/21/16 08:02 





 











PT  11.2 SECONDS (9.4-12.0)   12/14/15  05:20    


 


INR  1.05  (0.89-1.20)   12/14/15  05:20    


 


APTT  36.2 SECONDS (23.1-32.0)  H  12/14/15  05:20    














- Constitutional


Appears: Well, Non-toxic, No Acute Distress





- Head Exam


Head Exam: ATRAUMATIC, NORMAL INSPECTION, NORMOCEPHALIC





- Eye Exam


Eye Exam: EOMI, Normal appearance, PERRL


Pupil Exam: NORMAL ACCOMODATION, PERRL





- ENT Exam


ENT Exam: Mucous Membranes Moist, Normal Exam





- Neck Exam


Neck Exam: Full ROM, Normal Inspection.  absent: Lymphadenopathy





- Respiratory Exam


Respiratory Exam: Clear to Ausculation Bilateral, NORMAL BREATHING PATTERN





- Cardiovascular Exam


Cardiovascular Exam: REGULAR RHYTHM, RRR, +S1, +S2.  absent: Murmur





- GI/Abdominal Exam


GI & Abdominal Exam: Soft, Normal Bowel Sounds.  absent: Tenderness





- Extremities Exam


Extremities Exam: Full ROM, Normal Capillary Refill, Normal Inspection.  absent

: Joint Swelling, Pedal Edema





- Back Exam


Back Exam: NORMAL INSPECTION





- Neurological Exam


Neurological Exam: Alert, Awake, CN II-XII Intact, Normal Gait, Oriented x3





- Psychiatric Exam


Psychiatric exam: Normal Affect, Normal Mood





- Skin


Skin Exam: Dry, Intact, Normal Color, Warm





Assessment and Plan


(1) DVT prophylaxis


Status: Chronic





(2) CAD (coronary artery disease)


Status: Chronic





(3) Smoking


Status: Chronic





(4) Low back pain


Status: Chronic





(5) Scrotal edema


Status: Chronic





(6) Prediabetes


Status: Chronic





(7) Neurocognitive disorder


Status: Chronic





(8) Leg edema, right


Status: Acute








- Assessment and Plan (Free Text)


Assessment: 


(1) DVT prophylaxis


Assessment & Plan: 


scd and ae hose


ambulation


Status: Chronic





(2) CAD (coronary artery disease)


Assessment & Plan: 


cont meds


Status: Chronic





(3) Smoking


Assessment & Plan: 


nicoderm


Status: Chronic





(4) Low back pain


Assessment & Plan: 


ibuprofen prn


Status: Chronic





(5) Scrotal edema


Status: Chronic





(6) Prediabetes


Assessment & Plan: 


dietary


Status: Chronic





(7) Neurocognitive disorder


Assessment & Plan: 


cont med


pendign placement


Status: Chronic

## 2017-01-22 RX ADMIN — DIVALPROEX SODIUM SCH MG: 500 TABLET, DELAYED RELEASE ORAL at 08:19

## 2017-01-22 RX ADMIN — DIVALPROEX SODIUM SCH MG: 500 TABLET, DELAYED RELEASE ORAL at 16:00

## 2017-01-22 NOTE — CP.PCM.PN
Subjective





- Date & Time of Evaluation


Date of Evaluation: 01/22/17


Time of Evaluation: 09:16





- Subjective


Subjective: 


no complaints/distress


no f/c, n/v/d


pending placement





Objective





- Vital Signs/Intake and Output


Vital Signs (last 24 hours): 


 











Temp Pulse Resp BP Pulse Ox


 


 97.6 F   69   18   110/72   96 


 


 01/22/17 08:04  01/22/17 08:04  01/22/17 08:04  01/22/17 08:04  01/22/17 08:04











- Medications


Medications: 


 Current Medications





Acetaminophen (Tylenol 325mg Tab)  650 mg PO Q6 PRN


   PRN Reason: Fever >100.4 F


   Last Admin: 12/22/16 19:54 Dose:  650 mg


Aspirin (Ecotrin)  81 mg PO DAILY [facility]


   Last Admin: 01/22/17 08:19 Dose:  81 mg


Atorvastatin Calcium (Lipitor)  20 mg PO HS [facility]


   Last Admin: 01/21/17 22:03 Dose:  20 mg


Divalproex Sodium (Depakote Dr(*Bid*))  500 mg PO BID [facility]


   Last Admin: 01/22/17 08:19 Dose:  500 mg


Enalapril Maleate (Vasotec)  10 mg PO DAILY [facility]


   Last Admin: 01/22/17 08:19 Dose:  10 mg


Famotidine (Pepcid)  20 mg PO BID [facility]


   Last Admin: 01/22/17 08:19 Dose:  20 mg


Ibuprofen (Motrin Tab)  600 mg PO Q8 PRN


   PRN Reason: Pain, moderate (4-7)


   Last Admin: 01/06/17 16:32 Dose:  600 mg


Metoprolol Tartrate (Lopressor)  50 mg PO Q12 [facility]


   Last Admin: 01/22/17 08:19 Dose:  50 mg


Quetiapine Fumarate (Seroquel)  25 mg PO HS [facility]


   Last Admin: 01/21/17 22:03 Dose:  25 mg











- Labs


Labs: 


 





 12/21/16 08:02 





 12/21/16 08:02 





 











PT  11.2 SECONDS (9.4-12.0)   12/14/15  05:20    


 


INR  1.05  (0.89-1.20)   12/14/15  05:20    


 


APTT  36.2 SECONDS (23.1-32.0)  H  12/14/15  05:20    














- Constitutional


Appears: Well, Non-toxic, No Acute Distress





- Head Exam


Head Exam: ATRAUMATIC, NORMAL INSPECTION, NORMOCEPHALIC





- Eye Exam


Eye Exam: EOMI, Normal appearance, PERRL


Pupil Exam: NORMAL ACCOMODATION, PERRL





- ENT Exam


ENT Exam: Mucous Membranes Moist, Normal Exam





- Neck Exam


Neck Exam: Full ROM, Normal Inspection.  absent: Lymphadenopathy





- Respiratory Exam


Respiratory Exam: Clear to Ausculation Bilateral, NORMAL BREATHING PATTERN





- Cardiovascular Exam


Cardiovascular Exam: REGULAR RHYTHM, +S1, +S2.  absent: Murmur





- GI/Abdominal Exam


GI & Abdominal Exam: Soft, Normal Bowel Sounds.  absent: Tenderness





- Extremities Exam


Extremities Exam: Full ROM, Normal Capillary Refill, Normal Inspection.  absent

: Joint Swelling, Pedal Edema





- Back Exam


Back Exam: NORMAL INSPECTION





- Neurological Exam


Neurological Exam: Alert, Awake, CN II-XII Intact, Normal Gait, Oriented x3





- Psychiatric Exam


Psychiatric exam: Normal Affect, Normal Mood





- Skin


Skin Exam: Dry, Intact, Normal Color, Warm





Assessment and Plan


(1) DVT prophylaxis


Status: Chronic





(2) CAD (coronary artery disease)


Status: Chronic





(3) Smoking


Status: Chronic





(4) Low back pain


Status: Chronic





(5) Scrotal edema


Status: Chronic





(6) Prediabetes


Status: Chronic





(7) Neurocognitive disorder


Status: Chronic





(8) Leg edema, right


Status: Acute








- Assessment and Plan (Free Text)


Assessment: 


(1) DVT prophylaxis


Assessment & Plan: 


scd and ae hose


ambulation


Status: Chronic





(2) CAD (coronary artery disease)


Assessment & Plan: 


cont meds


Status: Chronic





(3) Smoking


Assessment & Plan: 


nicoderm


Status: Chronic





(4) Low back pain


Assessment & Plan: 


ibuprofen prn


Status: Chronic





(5) Scrotal edema


Status: Chronic





(6) Prediabetes


Assessment & Plan: 


dietary


Status: Chronic





(7) Neurocognitive disorder


Assessment & Plan: 


cont med


pendign placement


Status: Chronic

## 2017-01-23 RX ADMIN — DIVALPROEX SODIUM SCH MG: 500 TABLET, DELAYED RELEASE ORAL at 16:13

## 2017-01-23 RX ADMIN — DIVALPROEX SODIUM SCH MG: 500 TABLET, DELAYED RELEASE ORAL at 08:31

## 2017-01-23 NOTE — CP.PCM.PN
Subjective





- Date & Time of Evaluation


Date of Evaluation: 01/23/17


Time of Evaluation: 07:26





- Subjective


Subjective: 


no complaints/distres


no f/c, n/v/d


pending placement





Objective





- Vital Signs/Intake and Output


Vital Signs (last 24 hours): 


 











Temp Pulse Resp BP Pulse Ox


 


 98.7 F   75   18   144/88   99 


 


 01/22/17 21:18  01/22/17 21:30  01/22/17 21:18  01/22/17 21:30  01/22/17 21:18











- Medications


Medications: 


 Current Medications





Acetaminophen (Tylenol 325mg Tab)  650 mg PO Q6 PRN


   PRN Reason: Fever >100.4 F


   Last Admin: 12/22/16 19:54 Dose:  650 mg


Aspirin (Ecotrin)  81 mg PO DAILY [facility]


   Last Admin: 01/22/17 08:19 Dose:  81 mg


Atorvastatin Calcium (Lipitor)  20 mg PO HS [facility]


   Last Admin: 01/22/17 21:30 Dose:  20 mg


Divalproex Sodium (Depakote Dr(*Bid*))  500 mg PO BID [facility]


   Last Admin: 01/22/17 16:00 Dose:  500 mg


Enalapril Maleate (Vasotec)  10 mg PO DAILY [facility]


   Last Admin: 01/22/17 08:19 Dose:  10 mg


Famotidine (Pepcid)  20 mg PO BID [facility]


   Last Admin: 01/22/17 16:00 Dose:  20 mg


Ibuprofen (Motrin Tab)  600 mg PO Q8 PRN


   PRN Reason: Pain, moderate (4-7)


   Last Admin: 01/06/17 16:32 Dose:  600 mg


Metoprolol Tartrate (Lopressor)  50 mg PO Q12 [facility]


   Last Admin: 01/22/17 21:30 Dose:  50 mg


Quetiapine Fumarate (Seroquel)  25 mg PO HS [facility]


   Last Admin: 01/22/17 21:30 Dose:  25 mg











- Labs


Labs: 


 





 12/21/16 08:02 





 12/21/16 08:02 





 











PT  11.2 SECONDS (9.4-12.0)   12/14/15  05:20    


 


INR  1.05  (0.89-1.20)   12/14/15  05:20    


 


APTT  36.2 SECONDS (23.1-32.0)  H  12/14/15  05:20    














- Constitutional


Appears: Well, Non-toxic, No Acute Distress





- Head Exam


Head Exam: ATRAUMATIC, NORMAL INSPECTION, NORMOCEPHALIC





- Eye Exam


Eye Exam: EOMI, Normal appearance, PERRL


Pupil Exam: NORMAL ACCOMODATION, PERRL





- ENT Exam


ENT Exam: Mucous Membranes Moist, Normal Exam





- Neck Exam


Neck Exam: Full ROM, Normal Inspection.  absent: Lymphadenopathy





- Respiratory Exam


Respiratory Exam: Clear to Ausculation Bilateral, NORMAL BREATHING PATTERN





- Cardiovascular Exam


Cardiovascular Exam: REGULAR RHYTHM, RRR, +S1, +S2.  absent: Murmur





- GI/Abdominal Exam


GI & Abdominal Exam: Soft, Normal Bowel Sounds.  absent: Tenderness





- Extremities Exam


Extremities Exam: Full ROM, Normal Capillary Refill, Normal Inspection.  absent

: Joint Swelling, Pedal Edema





- Back Exam


Back Exam: NORMAL INSPECTION





- Neurological Exam


Neurological Exam: Alert, Awake, CN II-XII Intact, Normal Gait, Oriented x3





- Psychiatric Exam


Psychiatric exam: Normal Affect, Normal Mood





- Skin


Skin Exam: Dry, Intact, Normal Color, Warm





Assessment and Plan


(1) DVT prophylaxis


Status: Chronic





(2) CAD (coronary artery disease)


Status: Chronic





(3) Smoking


Status: Chronic





(4) Low back pain


Status: Chronic





(5) Scrotal edema


Status: Chronic





(6) Prediabetes


Status: Chronic





(7) Neurocognitive disorder


Status: Chronic





(8) Leg edema, right


Status: Acute








- Assessment and Plan (Free Text)


Assessment: 


(1) DVT prophylaxis


Assessment & Plan: 


scd and ae hose


ambulation


Status: Chronic





(2) CAD (coronary artery disease)


Assessment & Plan: 


cont meds


Status: Chronic





(3) Smoking


Assessment & Plan: 


nicoderm


Status: Chronic





(4) Low back pain


Assessment & Plan: 


ibuprofen prn


Status: Chronic





(5) Scrotal edema


Status: Chronic





(6) Prediabetes


Assessment & Plan: 


dietary


Status: Chronic





(7) Neurocognitive disorder


Assessment & Plan: 


cont med


pendign placement


Status: Chronic

## 2017-01-24 RX ADMIN — DIVALPROEX SODIUM SCH MG: 500 TABLET, DELAYED RELEASE ORAL at 16:34

## 2017-01-24 RX ADMIN — DIVALPROEX SODIUM SCH MG: 500 TABLET, DELAYED RELEASE ORAL at 11:17

## 2017-01-24 NOTE — CP.PCM.PN
Subjective





- Date & Time of Evaluation


Date of Evaluation: 01/24/17


Time of Evaluation: 08:00





- Subjective


Subjective: 


no complaints/distress


no f/c, n/v/d


pnedin gplacement





Objective





- Vital Signs/Intake and Output


Vital Signs (last 24 hours): 


 











Temp Pulse Resp BP Pulse Ox


 


 98.8 F   74   20   114/72   97 


 


 01/23/17 22:18  01/23/17 22:18  01/23/17 22:18  01/23/17 22:18  01/23/17 22:18











- Medications


Medications: 


 Current Medications





Acetaminophen (Tylenol 325mg Tab)  650 mg PO Q6 PRN


   PRN Reason: Fever >100.4 F


   Last Admin: 12/22/16 19:54 Dose:  650 mg


Aspirin (Ecotrin)  81 mg PO DAILY [facility]


   Last Admin: 01/23/17 08:31 Dose:  81 mg


Atorvastatin Calcium (Lipitor)  20 mg PO HS [facility]


   Last Admin: 01/23/17 21:06 Dose:  20 mg


Divalproex Sodium (Depakote Dr(*Bid*))  500 mg PO BID [facility]


   Last Admin: 01/23/17 16:13 Dose:  500 mg


Enalapril Maleate (Vasotec)  10 mg PO DAILY [facility]


   Last Admin: 01/23/17 08:31 Dose:  10 mg


Famotidine (Pepcid)  20 mg PO BID [facility]


   Last Admin: 01/23/17 16:13 Dose:  20 mg


Ibuprofen (Motrin Tab)  600 mg PO Q8 PRN


   PRN Reason: Pain, moderate (4-7)


   Last Admin: 01/06/17 16:32 Dose:  600 mg


Metoprolol Tartrate (Lopressor)  50 mg PO Q12 [facility]


   Last Admin: 01/23/17 21:06 Dose:  50 mg


Quetiapine Fumarate (Seroquel)  25 mg PO HS [facility]


   Last Admin: 01/23/17 21:06 Dose:  25 mg











- Labs


Labs: 


 





 12/21/16 08:02 





 12/21/16 08:02 





 











PT  11.2 SECONDS (9.4-12.0)   12/14/15  05:20    


 


INR  1.05  (0.89-1.20)   12/14/15  05:20    


 


APTT  36.2 SECONDS (23.1-32.0)  H  12/14/15  05:20    














- Constitutional


Appears: Well, Non-toxic, No Acute Distress





- Head Exam


Head Exam: ATRAUMATIC, NORMAL INSPECTION, NORMOCEPHALIC





- Eye Exam


Eye Exam: EOMI, Normal appearance, PERRL


Pupil Exam: NORMAL ACCOMODATION, PERRL





- ENT Exam


ENT Exam: Mucous Membranes Moist, Normal Exam





- Neck Exam


Neck Exam: Full ROM, Normal Inspection.  absent: Lymphadenopathy





- Respiratory Exam


Respiratory Exam: Clear to Ausculation Bilateral, NORMAL BREATHING PATTERN





- Cardiovascular Exam


Cardiovascular Exam: REGULAR RHYTHM, RRR, +S1, +S2.  absent: Murmur





- GI/Abdominal Exam


GI & Abdominal Exam: Soft, Normal Bowel Sounds.  absent: Tenderness





- Extremities Exam


Extremities Exam: Full ROM, Normal Capillary Refill, Normal Inspection.  absent

: Joint Swelling, Pedal Edema





- Back Exam


Back Exam: NORMAL INSPECTION





- Neurological Exam


Neurological Exam: Alert, Awake, CN II-XII Intact, Normal Gait, Oriented x3





- Psychiatric Exam


Psychiatric exam: Normal Affect, Normal Mood





- Skin


Skin Exam: Dry, Intact, Normal Color, Warm





Assessment and Plan


(1) DVT prophylaxis


Status: Chronic





(2) CAD (coronary artery disease)


Status: Chronic





(3) Smoking


Status: Chronic





(4) Low back pain


Status: Chronic





(5) Scrotal edema


Status: Chronic





(6) Prediabetes


Status: Chronic





(7) Neurocognitive disorder


Status: Chronic





(8) Leg edema, right


Status: Acute








- Assessment and Plan (Free Text)


Assessment: 


(1) DVT prophylaxis


Assessment & Plan: 


scd and ae hose


ambulation


Status: Chronic





(2) CAD (coronary artery disease)


Assessment & Plan: 


cont meds


Status: Chronic





(3) Smoking


Assessment & Plan: 


nicoderm


Status: Chronic





(4) Low back pain


Assessment & Plan: 


ibuprofen prn


Status: Chronic





(5) Scrotal edema


Status: Chronic





(6) Prediabetes


Assessment & Plan: 


dietary


Status: Chronic





(7) Neurocognitive disorder


Assessment & Plan: 


cont med


pendign placement


Status: Chronic

## 2017-01-25 RX ADMIN — DIVALPROEX SODIUM SCH MG: 500 TABLET, DELAYED RELEASE ORAL at 08:35

## 2017-01-25 RX ADMIN — DIVALPROEX SODIUM SCH MG: 500 TABLET, DELAYED RELEASE ORAL at 16:50

## 2017-01-25 NOTE — CP.PCM.PN
Subjective





- Date & Time of Evaluation


Date of Evaluation: 01/25/17


Time of Evaluation: 08:01





- Subjective


Subjective: 


no complaints/distress


no f/c, n/v/d


penidng placement





Objective





- Vital Signs/Intake and Output


Vital Signs (last 24 hours): 


 











Temp Pulse Resp BP Pulse Ox


 


 98.6 F   82   20   131/82   99 


 


 01/24/17 21:31  01/24/17 21:41  01/24/17 21:31  01/24/17 21:41  01/24/17 21:31











- Medications


Medications: 


 Current Medications





Acetaminophen (Tylenol 325mg Tab)  650 mg PO Q6 PRN


   PRN Reason: Fever >100.4 F


   Last Admin: 12/22/16 19:54 Dose:  650 mg


Aspirin (Ecotrin)  81 mg PO DAILY [facility]


   Last Admin: 01/24/17 08:07 Dose:  81 mg


Atorvastatin Calcium (Lipitor)  20 mg PO HS [facility]


   Last Admin: 01/24/17 21:42 Dose:  20 mg


Divalproex Sodium (Depakote Dr(*Bid*))  500 mg PO BID [facility]


   Last Admin: 01/24/17 16:34 Dose:  500 mg


Enalapril Maleate (Vasotec)  10 mg PO DAILY [facility]


   Last Admin: 01/24/17 08:07 Dose:  10 mg


Famotidine (Pepcid)  20 mg PO BID [facility]


   Last Admin: 01/24/17 16:34 Dose:  20 mg


Ibuprofen (Motrin Tab)  600 mg PO Q8 PRN


   PRN Reason: Pain, moderate (4-7)


   Last Admin: 01/06/17 16:32 Dose:  600 mg


Metoprolol Tartrate (Lopressor)  50 mg PO Q12 [facility]


   Last Admin: 01/24/17 21:41 Dose:  50 mg


Quetiapine Fumarate (Seroquel)  25 mg PO HS [facility]


   Last Admin: 01/24/17 21:42 Dose:  25 mg











- Labs


Labs: 


 





 12/21/16 08:02 





 12/21/16 08:02 





 











PT  11.2 SECONDS (9.4-12.0)   12/14/15  05:20    


 


INR  1.05  (0.89-1.20)   12/14/15  05:20    


 


APTT  36.2 SECONDS (23.1-32.0)  H  12/14/15  05:20    














- Constitutional


Appears: Well, Non-toxic, No Acute Distress





- Head Exam


Head Exam: ATRAUMATIC, NORMAL INSPECTION, NORMOCEPHALIC





- Eye Exam


Eye Exam: EOMI, Normal appearance, PERRL


Pupil Exam: NORMAL ACCOMODATION, PERRL





- ENT Exam


ENT Exam: Mucous Membranes Moist, Normal Exam





- Neck Exam


Neck Exam: Full ROM, Normal Inspection.  absent: Lymphadenopathy





- Respiratory Exam


Respiratory Exam: Clear to Ausculation Bilateral, NORMAL BREATHING PATTERN





- Cardiovascular Exam


Cardiovascular Exam: REGULAR RHYTHM, +S1, +S2.  absent: Murmur





- GI/Abdominal Exam


GI & Abdominal Exam: Soft, Normal Bowel Sounds.  absent: Tenderness





- Extremities Exam


Extremities Exam: Full ROM, Normal Capillary Refill, Normal Inspection.  absent

: Joint Swelling, Pedal Edema





- Back Exam


Back Exam: NORMAL INSPECTION





- Neurological Exam


Neurological Exam: Alert, Awake, CN II-XII Intact, Normal Gait, Oriented x3





- Psychiatric Exam


Psychiatric exam: Normal Affect, Normal Mood





- Skin


Skin Exam: Dry, Intact, Normal Color, Warm





Assessment and Plan


(1) DVT prophylaxis


Status: Chronic





(2) CAD (coronary artery disease)


Status: Chronic





(3) Smoking


Status: Chronic





(4) Low back pain


Status: Chronic





(5) Scrotal edema


Status: Chronic





(6) Prediabetes


Status: Chronic





(7) Neurocognitive disorder


Status: Chronic





(8) Leg edema, right


Status: Acute








- Assessment and Plan (Free Text)


Assessment: 


(1) DVT prophylaxis


Assessment & Plan: 


scd and ae hose


ambulation


Status: Chronic





(2) CAD (coronary artery disease)


Assessment & Plan: 


cont meds


Status: Chronic





(3) Smoking


Assessment & Plan: 


nicoderm


Status: Chronic





(4) Low back pain


Assessment & Plan: 


ibuprofen prn


Status: Chronic





(5) Scrotal edema


Status: Chronic





(6) Prediabetes


Assessment & Plan: 


dietary


Status: Chronic





(7) Neurocognitive disorder


Assessment & Plan: 


cont med


pendign placement


Status: Chronic

## 2017-01-26 RX ADMIN — DIVALPROEX SODIUM SCH MG: 500 TABLET, DELAYED RELEASE ORAL at 17:19

## 2017-01-26 RX ADMIN — DIVALPROEX SODIUM SCH MG: 500 TABLET, DELAYED RELEASE ORAL at 09:28

## 2017-01-26 NOTE — CP.PCM.PN
Subjective





- Date & Time of Evaluation


Date of Evaluation: 01/26/17


Time of Evaluation: 07:07





- Subjective


Subjective: 


no complaints/distress


no f/c, n/v/d


pending placement





Objective





- Vital Signs/Intake and Output


Vital Signs (last 24 hours): 


 











Temp Pulse Resp BP Pulse Ox


 


 98.4 F   100 H  18   132/80   99 


 


 01/25/17 21:09  01/25/17 21:23  01/25/17 21:09  01/25/17 21:23  01/25/17 21:09











- Medications


Medications: 


 Current Medications





Acetaminophen (Tylenol 325mg Tab)  650 mg PO Q6 PRN


   PRN Reason: Fever >100.4 F


   Last Admin: 12/22/16 19:54 Dose:  650 mg


Aspirin (Ecotrin)  81 mg PO DAILY [facility]


   Last Admin: 01/25/17 08:35 Dose:  81 mg


Atorvastatin Calcium (Lipitor)  20 mg PO HS [facility]


   Last Admin: 01/25/17 21:22 Dose:  20 mg


Divalproex Sodium (Depakote Dr(*Bid*))  500 mg PO BID [facility]


   Last Admin: 01/25/17 16:50 Dose:  500 mg


Enalapril Maleate (Vasotec)  10 mg PO DAILY [facility]


   Last Admin: 01/25/17 08:37 Dose:  10 mg


Famotidine (Pepcid)  20 mg PO BID [facility]


   Last Admin: 01/25/17 16:50 Dose:  20 mg


Ibuprofen (Motrin Tab)  600 mg PO Q8 PRN


   PRN Reason: Pain, moderate (4-7)


   Last Admin: 01/06/17 16:32 Dose:  600 mg


Metoprolol Tartrate (Lopressor)  50 mg PO Q12 [facility]


   Last Admin: 01/25/17 21:23 Dose:  50 mg


Quetiapine Fumarate (Seroquel)  25 mg PO HS [facility]


   Last Admin: 01/25/17 21:22 Dose:  25 mg











- Labs


Labs: 


 





 12/21/16 08:02 





 12/21/16 08:02 





 











PT  11.2 SECONDS (9.4-12.0)   12/14/15  05:20    


 


INR  1.05  (0.89-1.20)   12/14/15  05:20    


 


APTT  36.2 SECONDS (23.1-32.0)  H  12/14/15  05:20    














- Constitutional


Appears: Well, Non-toxic, No Acute Distress





- Head Exam


Head Exam: ATRAUMATIC, NORMAL INSPECTION, NORMOCEPHALIC





- Eye Exam


Eye Exam: EOMI, Normal appearance, PERRL


Pupil Exam: NORMAL ACCOMODATION, PERRL





- ENT Exam


ENT Exam: Mucous Membranes Moist, Normal Exam





- Neck Exam


Neck Exam: Full ROM, Normal Inspection.  absent: Lymphadenopathy





- Respiratory Exam


Respiratory Exam: Clear to Ausculation Bilateral, NORMAL BREATHING PATTERN





- Cardiovascular Exam


Cardiovascular Exam: REGULAR RHYTHM, +S1, +S2.  absent: Murmur





- GI/Abdominal Exam


GI & Abdominal Exam: Soft.  absent: Tenderness





- Extremities Exam


Extremities Exam: Full ROM, Normal Capillary Refill, Normal Inspection.  absent

: Joint Swelling, Pedal Edema





- Back Exam


Back Exam: NORMAL INSPECTION





- Neurological Exam


Neurological Exam: Alert, Awake, CN II-XII Intact, Normal Gait, Oriented x3





- Psychiatric Exam


Psychiatric exam: Normal Affect, Normal Mood





- Skin


Skin Exam: Dry, Intact, Normal Color, Warm





Assessment and Plan


(1) DVT prophylaxis


Status: Chronic





(2) CAD (coronary artery disease)


Status: Chronic





(3) Smoking


Status: Chronic





(4) Low back pain


Status: Chronic





(5) Scrotal edema


Status: Chronic





(6) Prediabetes


Status: Chronic





(7) Neurocognitive disorder


Status: Chronic





(8) Leg edema, right


Status: Acute








- Assessment and Plan (Free Text)


Assessment: 


(1) DVT prophylaxis


Assessment & Plan: 


scd and ae hose


ambulation


Status: Chronic





(2) CAD (coronary artery disease)


Assessment & Plan: 


cont meds


Status: Chronic





(3) Smoking


Assessment & Plan: 


nicoderm


Status: Chronic





(4) Low back pain


Assessment & Plan: 


ibuprofen prn


Status: Chronic





(5) Scrotal edema


Status: Chronic





(6) Prediabetes


Assessment & Plan: 


dietary


Status: Chronic





(7) Neurocognitive disorder


Assessment & Plan: 


cont med


pendign placement


Status: Chronic

## 2017-01-27 RX ADMIN — DIVALPROEX SODIUM SCH MG: 500 TABLET, DELAYED RELEASE ORAL at 16:28

## 2017-01-27 RX ADMIN — DIVALPROEX SODIUM SCH MG: 500 TABLET, DELAYED RELEASE ORAL at 09:16

## 2017-01-27 NOTE — CP.PCM.PN
Subjective





- Date & Time of Evaluation


Date of Evaluation: 01/27/17


Time of Evaluation: 07:35





- Subjective


Subjective: 


no complaints/distress


penidngp lacement


no f/c, n/v/d





Objective





- Vital Signs/Intake and Output


Vital Signs (last 24 hours): 


 











Temp Pulse Resp BP Pulse Ox


 


 98.7 F   100 H  20   123/76   96 


 


 01/26/17 20:21  01/26/17 21:22  01/26/17 20:21  01/26/17 21:22  01/26/17 20:21











- Medications


Medications: 


 Current Medications





Acetaminophen (Tylenol 325mg Tab)  650 mg PO Q6 PRN


   PRN Reason: Fever >100.4 F


   Last Admin: 12/22/16 19:54 Dose:  650 mg


Aspirin (Ecotrin)  81 mg PO DAILY [facility]


   Last Admin: 01/26/17 09:29 Dose:  81 mg


Atorvastatin Calcium (Lipitor)  20 mg PO HS [facility]


   Last Admin: 01/26/17 21:21 Dose:  20 mg


Divalproex Sodium (Depakote Dr(*Bid*))  500 mg PO BID [facility]


   Last Admin: 01/26/17 17:19 Dose:  500 mg


Enalapril Maleate (Vasotec)  10 mg PO DAILY [facility]


   Last Admin: 01/26/17 09:29 Dose:  10 mg


Famotidine (Pepcid)  20 mg PO BID [facility]


   Last Admin: 01/26/17 17:19 Dose:  20 mg


Ibuprofen (Motrin Tab)  600 mg PO Q8 PRN


   PRN Reason: Pain, moderate (4-7)


   Last Admin: 01/06/17 16:32 Dose:  600 mg


Metoprolol Tartrate (Lopressor)  50 mg PO Q12 [facility]


   Last Admin: 01/26/17 21:22 Dose:  50 mg


Quetiapine Fumarate (Seroquel)  25 mg PO HS [facility]


   Last Admin: 01/26/17 21:22 Dose:  25 mg











- Labs


Labs: 


 





 12/21/16 08:02 





 12/21/16 08:02 





 











PT  11.2 SECONDS (9.4-12.0)   12/14/15  05:20    


 


INR  1.05  (0.89-1.20)   12/14/15  05:20    


 


APTT  36.2 SECONDS (23.1-32.0)  H  12/14/15  05:20    














- Constitutional


Appears: Well, Non-toxic, No Acute Distress





- Head Exam


Head Exam: ATRAUMATIC, NORMAL INSPECTION, NORMOCEPHALIC





- Eye Exam


Eye Exam: EOMI, Normal appearance, PERRL


Pupil Exam: NORMAL ACCOMODATION, PERRL





- ENT Exam


ENT Exam: Mucous Membranes Moist, Normal Exam





- Neck Exam


Neck Exam: Full ROM, Normal Inspection.  absent: Lymphadenopathy





- Respiratory Exam


Respiratory Exam: Clear to Ausculation Bilateral, NORMAL BREATHING PATTERN





- Cardiovascular Exam


Cardiovascular Exam: REGULAR RHYTHM, +S1, +S2.  absent: Murmur





- GI/Abdominal Exam


GI & Abdominal Exam: Soft, Normal Bowel Sounds.  absent: Tenderness





- Extremities Exam


Extremities Exam: Full ROM, Normal Capillary Refill, Normal Inspection.  absent

: Joint Swelling, Pedal Edema





- Back Exam


Back Exam: NORMAL INSPECTION





- Neurological Exam


Neurological Exam: Alert, Awake, CN II-XII Intact, Normal Gait, Oriented x3





- Psychiatric Exam


Psychiatric exam: Normal Affect, Normal Mood





- Skin


Skin Exam: Dry, Intact, Normal Color, Warm





Assessment and Plan


(1) DVT prophylaxis


Status: Chronic





(2) CAD (coronary artery disease)


Status: Chronic





(3) Smoking


Status: Chronic





(4) Low back pain


Status: Chronic





(5) Scrotal edema


Status: Chronic





(6) Prediabetes


Status: Chronic





(7) Neurocognitive disorder


Status: Chronic





(8) Leg edema, right


Status: Acute








- Assessment and Plan (Free Text)


Assessment: 


(1) DVT prophylaxis


Assessment & Plan: 


scd and ae hose


ambulation


Status: Chronic





(2) CAD (coronary artery disease)


Assessment & Plan: 


cont meds


Status: Chronic





(3) Smoking


Assessment & Plan: 


nicoderm


Status: Chronic





(4) Low back pain


Assessment & Plan: 


ibuprofen prn


Status: Chronic





(5) Scrotal edema


Status: Chronic





(6) Prediabetes


Assessment & Plan: 


dietary


Status: Chronic





(7) Neurocognitive disorder


Assessment & Plan: 


cont med


pendign placement


Status: Chronic

## 2017-01-28 RX ADMIN — DIVALPROEX SODIUM SCH MG: 500 TABLET, DELAYED RELEASE ORAL at 08:42

## 2017-01-28 RX ADMIN — DIVALPROEX SODIUM SCH MG: 500 TABLET, DELAYED RELEASE ORAL at 16:06

## 2017-01-28 NOTE — CP.PCM.PN
Subjective





- Date & Time of Evaluation


Date of Evaluation: 01/28/17


Time of Evaluation: 07:23





- Subjective


Subjective: 


no complaints/distress


no f/.c, n/v/d


pending placement





Objective





- Vital Signs/Intake and Output


Vital Signs (last 24 hours): 


 











Temp Pulse Resp BP Pulse Ox


 


 98.5 F   75   18   122/80   98 


 


 01/27/17 21:02  01/27/17 21:02  01/27/17 21:02  01/27/17 21:02  01/27/17 21:02











- Medications


Medications: 


 Current Medications





Acetaminophen (Tylenol 325mg Tab)  650 mg PO Q6 PRN


   PRN Reason: Fever >100.4 F


   Last Admin: 12/22/16 19:54 Dose:  650 mg


Aspirin (Ecotrin)  81 mg PO DAILY [facility]


   Last Admin: 01/27/17 09:17 Dose:  81 mg


Atorvastatin Calcium (Lipitor)  20 mg PO HS [facility]


   Last Admin: 01/27/17 21:00 Dose:  20 mg


Divalproex Sodium (Depakote Dr(*Bid*))  500 mg PO BID [facility]


   Last Admin: 01/27/17 16:28 Dose:  500 mg


Enalapril Maleate (Vasotec)  10 mg PO DAILY [facility]


   Last Admin: 01/27/17 09:17 Dose:  10 mg


Famotidine (Pepcid)  20 mg PO BID [facility]


   Last Admin: 01/27/17 16:28 Dose:  20 mg


Ibuprofen (Motrin Tab)  600 mg PO Q8 PRN


   PRN Reason: Pain, moderate (4-7)


   Last Admin: 01/06/17 16:32 Dose:  600 mg


Metoprolol Tartrate (Lopressor)  50 mg PO Q12 [facility]


   Last Admin: 01/27/17 21:00 Dose:  50 mg


Quetiapine Fumarate (Seroquel)  25 mg PO HS [facility]


   Last Admin: 01/27/17 21:00 Dose:  25 mg











- Labs


Labs: 


 





 12/21/16 08:02 





 12/21/16 08:02 





 











PT  11.2 SECONDS (9.4-12.0)   12/14/15  05:20    


 


INR  1.05  (0.89-1.20)   12/14/15  05:20    


 


APTT  36.2 SECONDS (23.1-32.0)  H  12/14/15  05:20    














- Constitutional


Appears: Well, Non-toxic, No Acute Distress





- Head Exam


Head Exam: ATRAUMATIC, NORMAL INSPECTION, NORMOCEPHALIC





- Eye Exam


Eye Exam: EOMI, Normal appearance, PERRL


Pupil Exam: NORMAL ACCOMODATION, PERRL





- ENT Exam


ENT Exam: Mucous Membranes Moist, Normal Exam





- Neck Exam


Neck Exam: Full ROM, Normal Inspection.  absent: Lymphadenopathy





- Respiratory Exam


Respiratory Exam: Clear to Ausculation Bilateral, NORMAL BREATHING PATTERN





- Cardiovascular Exam


Cardiovascular Exam: REGULAR RHYTHM, RRR, +S1, +S2.  absent: Murmur





- GI/Abdominal Exam


GI & Abdominal Exam: Soft, Normal Bowel Sounds.  absent: Tenderness





- Extremities Exam


Extremities Exam: Full ROM, Normal Capillary Refill, Normal Inspection.  absent

: Joint Swelling, Pedal Edema





- Back Exam


Back Exam: NORMAL INSPECTION





- Neurological Exam


Neurological Exam: Alert, Awake, CN II-XII Intact, Normal Gait, Oriented x3





- Psychiatric Exam


Psychiatric exam: Normal Affect, Normal Mood





- Skin


Skin Exam: Dry, Intact, Normal Color, Warm





Assessment and Plan


(1) DVT prophylaxis


Status: Chronic





(2) CAD (coronary artery disease)


Status: Chronic





(3) Smoking


Status: Chronic





(4) Low back pain


Status: Chronic





(5) Scrotal edema


Status: Chronic





(6) Prediabetes


Status: Chronic





(7) Neurocognitive disorder


Status: Chronic





(8) Leg edema, right


Status: Acute








- Assessment and Plan (Free Text)


Assessment: 


(1) DVT prophylaxis


Assessment & Plan: 


scd and ae hose


ambulation


Status: Chronic





(2) CAD (coronary artery disease)


Assessment & Plan: 


cont meds


Status: Chronic





(3) Smoking


Assessment & Plan: 


nicoderm


Status: Chronic





(4) Low back pain


Assessment & Plan: 


ibuprofen prn


Status: Chronic





(5) Scrotal edema


Status: Chronic





(6) Prediabetes


Assessment & Plan: 


dietary


Status: Chronic





(7) Neurocognitive disorder


Assessment & Plan: 


cont med


pendign placement


Status: Chronic

## 2017-01-29 RX ADMIN — DIVALPROEX SODIUM SCH MG: 500 TABLET, DELAYED RELEASE ORAL at 17:01

## 2017-01-29 RX ADMIN — DIVALPROEX SODIUM SCH MG: 500 TABLET, DELAYED RELEASE ORAL at 08:26

## 2017-01-29 NOTE — CP.PCM.PN
Subjective





- Date & Time of Evaluation


Date of Evaluation: 01/29/17


Time of Evaluation: 10:19





- Subjective


Subjective: 


no f/c, n/v/d


no complaints/distress


pneindgplacement





Objective





- Vital Signs/Intake and Output


Vital Signs (last 24 hours): 


 











Temp Pulse Resp BP Pulse Ox


 


 97.3 F L  65   20   112/72   95 


 


 01/29/17 09:03  01/29/17 09:03  01/29/17 09:03  01/29/17 09:03  01/29/17 09:03











- Medications


Medications: 


 Current Medications





Acetaminophen (Tylenol 325mg Tab)  650 mg PO Q6 PRN


   PRN Reason: Fever >100.4 F


   Last Admin: 12/22/16 19:54 Dose:  650 mg


Aspirin (Ecotrin)  81 mg PO DAILY [facility]


   Last Admin: 01/29/17 08:25 Dose:  81 mg


Atorvastatin Calcium (Lipitor)  20 mg PO HS [facility]


   Last Admin: 01/28/17 21:08 Dose:  20 mg


Divalproex Sodium (Depakote Dr(*Bid*))  500 mg PO BID [facility]


   Last Admin: 01/29/17 08:26 Dose:  500 mg


Enalapril Maleate (Vasotec)  10 mg PO DAILY [facility]


   Last Admin: 01/29/17 08:25 Dose:  10 mg


Famotidine (Pepcid)  20 mg PO BID [facility]


   Last Admin: 01/29/17 08:25 Dose:  20 mg


Ibuprofen (Motrin Tab)  600 mg PO Q8 PRN


   PRN Reason: Pain, moderate (4-7)


   Last Admin: 01/06/17 16:32 Dose:  600 mg


Metoprolol Tartrate (Lopressor)  50 mg PO Q12 [facility]


   Last Admin: 01/29/17 08:26 Dose:  50 mg


Quetiapine Fumarate (Seroquel)  25 mg PO HS [facility]


   Last Admin: 01/28/17 21:08 Dose:  25 mg











- Labs


Labs: 


 





 12/21/16 08:02 





 12/21/16 08:02 





 











PT  11.2 SECONDS (9.4-12.0)   12/14/15  05:20    


 


INR  1.05  (0.89-1.20)   12/14/15  05:20    


 


APTT  36.2 SECONDS (23.1-32.0)  H  12/14/15  05:20    














- Constitutional


Appears: Well, Non-toxic, No Acute Distress





- Head Exam


Head Exam: ATRAUMATIC, NORMAL INSPECTION, NORMOCEPHALIC





- Eye Exam


Eye Exam: EOMI, Normal appearance, PERRL


Pupil Exam: NORMAL ACCOMODATION, PERRL





- ENT Exam


ENT Exam: Mucous Membranes Moist, Normal Exam





- Neck Exam


Neck Exam: Full ROM, Normal Inspection.  absent: Lymphadenopathy





- Respiratory Exam


Respiratory Exam: Clear to Ausculation Bilateral, NORMAL BREATHING PATTERN





- Cardiovascular Exam


Cardiovascular Exam: REGULAR RHYTHM, RRR, +S1, +S2.  absent: Murmur





- GI/Abdominal Exam


GI & Abdominal Exam: Soft, Normal Bowel Sounds.  absent: Tenderness





- Extremities Exam


Extremities Exam: Full ROM, Normal Capillary Refill, Normal Inspection.  absent

: Joint Swelling, Pedal Edema





- Back Exam


Back Exam: NORMAL INSPECTION





- Neurological Exam


Neurological Exam: Alert, Awake, CN II-XII Intact, Normal Gait, Oriented x3





- Psychiatric Exam


Psychiatric exam: Normal Affect, Normal Mood





- Skin


Skin Exam: Dry, Intact, Normal Color, Warm





Assessment and Plan


(1) DVT prophylaxis


Status: Chronic





(2) CAD (coronary artery disease)


Status: Chronic





(3) Smoking


Status: Chronic





(4) Low back pain


Status: Chronic





(5) Scrotal edema


Status: Chronic





(6) Prediabetes


Status: Chronic





(7) Neurocognitive disorder


Status: Chronic





(8) Leg edema, right


Status: Acute








- Assessment and Plan (Free Text)


Assessment: 


(1) DVT prophylaxis


Assessment & Plan: 


scd and ae hose


ambulation


Status: Chronic





(2) CAD (coronary artery disease)


Assessment & Plan: 


cont meds


Status: Chronic





(3) Smoking


Assessment & Plan: 


nicoderm


Status: Chronic





(4) Low back pain


Assessment & Plan: 


ibuprofen prn


Status: Chronic





(5) Scrotal edema


Status: Chronic





(6) Prediabetes


Assessment & Plan: 


dietary


Status: Chronic





(7) Neurocognitive disorder


Assessment & Plan: 


cont med


pendign placement


Status: Chronic

## 2017-01-30 RX ADMIN — DIVALPROEX SODIUM SCH MG: 500 TABLET, DELAYED RELEASE ORAL at 17:35

## 2017-01-30 RX ADMIN — DIVALPROEX SODIUM SCH MG: 500 TABLET, DELAYED RELEASE ORAL at 09:48

## 2017-01-30 NOTE — CP.PCM.PN
Subjective





- Date & Time of Evaluation


Date of Evaluation: 01/30/17


Time of Evaluation: 07:22





- Subjective


Subjective: 


no f/c, n/v/d


pending placement


no distress/complaints





Objective





- Vital Signs/Intake and Output


Vital Signs (last 24 hours): 


 











Temp Pulse Resp BP Pulse Ox


 


 98.8 F   79   20   120/76   98 


 


 01/29/17 22:35  01/29/17 22:35  01/29/17 22:35  01/29/17 22:35  01/29/17 22:35











- Medications


Medications: 


 Current Medications





Acetaminophen (Tylenol 325mg Tab)  650 mg PO Q6 PRN


   PRN Reason: Fever >100.4 F


   Last Admin: 12/22/16 19:54 Dose:  650 mg


Aspirin (Ecotrin)  81 mg PO DAILY [facility]


   Last Admin: 01/29/17 08:25 Dose:  81 mg


Atorvastatin Calcium (Lipitor)  20 mg PO HS [facility]


   Last Admin: 01/29/17 22:23 Dose:  20 mg


Divalproex Sodium (Depakote Dr(*Bid*))  500 mg PO BID [facility]


   Last Admin: 01/29/17 17:01 Dose:  500 mg


Enalapril Maleate (Vasotec)  10 mg PO DAILY [facility]


   Last Admin: 01/29/17 08:25 Dose:  10 mg


Famotidine (Pepcid)  20 mg PO BID [facility]


   Last Admin: 01/29/17 17:01 Dose:  20 mg


Ibuprofen (Motrin Tab)  600 mg PO Q8 PRN


   PRN Reason: Pain, moderate (4-7)


   Last Admin: 01/06/17 16:32 Dose:  600 mg


Metoprolol Tartrate (Lopressor)  50 mg PO Q12 [facility]


   Last Admin: 01/29/17 22:25 Dose:  Not Given


Quetiapine Fumarate (Seroquel)  25 mg PO HS [facility]


   Last Admin: 01/29/17 22:23 Dose:  25 mg











- Labs


Labs: 


 





 12/21/16 08:02 





 12/21/16 08:02 





 











PT  11.2 SECONDS (9.4-12.0)   12/14/15  05:20    


 


INR  1.05  (0.89-1.20)   12/14/15  05:20    


 


APTT  36.2 SECONDS (23.1-32.0)  H  12/14/15  05:20    














- Constitutional


Appears: Well, Non-toxic, No Acute Distress





- Head Exam


Head Exam: ATRAUMATIC, NORMAL INSPECTION, NORMOCEPHALIC





- Eye Exam


Eye Exam: EOMI, Normal appearance, PERRL


Pupil Exam: NORMAL ACCOMODATION, PERRL





- ENT Exam


ENT Exam: Mucous Membranes Moist, Normal Exam





- Neck Exam


Neck Exam: Full ROM, Normal Inspection.  absent: Lymphadenopathy





- Respiratory Exam


Respiratory Exam: Clear to Ausculation Bilateral, NORMAL BREATHING PATTERN





- Cardiovascular Exam


Cardiovascular Exam: REGULAR RHYTHM, RRR, +S1, +S2.  absent: Murmur





- GI/Abdominal Exam


GI & Abdominal Exam: Soft, Normal Bowel Sounds.  absent: Tenderness





- Extremities Exam


Extremities Exam: Full ROM, Normal Capillary Refill, Normal Inspection.  absent

: Joint Swelling, Pedal Edema





- Back Exam


Back Exam: NORMAL INSPECTION





- Neurological Exam


Neurological Exam: Alert, Awake, CN II-XII Intact, Normal Gait, Oriented x3





- Psychiatric Exam


Psychiatric exam: Normal Affect, Normal Mood





- Skin


Skin Exam: Dry, Intact, Normal Color, Warm





Assessment and Plan


(1) DVT prophylaxis


Status: Chronic





(2) CAD (coronary artery disease)


Status: Chronic





(3) Smoking


Status: Chronic





(4) Low back pain


Status: Chronic





(5) Scrotal edema


Status: Chronic





(6) Prediabetes


Status: Chronic





(7) Neurocognitive disorder


Status: Chronic





(8) Leg edema, right


Status: Acute








- Assessment and Plan (Free Text)


Assessment: 


(1) DVT prophylaxis


Assessment & Plan: 


scd and ae hose


ambulation


Status: Chronic





(2) CAD (coronary artery disease)


Assessment & Plan: 


cont meds


Status: Chronic





(3) Smoking


Assessment & Plan: 


nicoderm


Status: Chronic





(4) Low back pain


Assessment & Plan: 


ibuprofen prn


Status: Chronic





(5) Scrotal edema


Status: Chronic





(6) Prediabetes


Assessment & Plan: 


dietary


Status: Chronic





(7) Neurocognitive disorder


Assessment & Plan: 


cont med


pendign placement


Status: Chronic

## 2017-01-31 RX ADMIN — DIVALPROEX SODIUM SCH MG: 500 TABLET, DELAYED RELEASE ORAL at 17:14

## 2017-01-31 RX ADMIN — DIVALPROEX SODIUM SCH MG: 500 TABLET, DELAYED RELEASE ORAL at 09:38

## 2017-01-31 NOTE — CP.PCM.PN
Subjective





- Date & Time of Evaluation


Date of Evaluation: 01/31/17


Time of Evaluation: 06:33





- Subjective


Subjective: 


no f/c, n/v/d


pneidn gplacement


no complaints/distress





Objective





- Vital Signs/Intake and Output


Vital Signs (last 24 hours): 


 











Temp Pulse Resp BP Pulse Ox


 


 98.2 F   66   20   117/78   98 


 


 01/30/17 21:47  01/30/17 21:47  01/30/17 21:47  01/30/17 21:47  01/30/17 21:47











- Medications


Medications: 


 Current Medications





Acetaminophen (Tylenol 325mg Tab)  650 mg PO Q6 PRN


   PRN Reason: Fever >100.4 F


   Last Admin: 12/22/16 19:54 Dose:  650 mg


Aspirin (Ecotrin)  81 mg PO DAILY [facility]


   Last Admin: 01/30/17 09:47 Dose:  81 mg


Atorvastatin Calcium (Lipitor)  20 mg PO HS [facility]


   Last Admin: 01/30/17 21:05 Dose:  20 mg


Divalproex Sodium (Depakote Dr(*Bid*))  500 mg PO BID [facility]


   Last Admin: 01/30/17 17:35 Dose:  500 mg


Enalapril Maleate (Vasotec)  10 mg PO DAILY [facility]


   Last Admin: 01/30/17 09:36 Dose:  10 mg


Famotidine (Pepcid)  20 mg PO BID [facility]


   Last Admin: 01/30/17 17:36 Dose:  20 mg


Ibuprofen (Motrin Tab)  600 mg PO Q8 PRN


   PRN Reason: Pain, moderate (4-7)


   Last Admin: 01/06/17 16:32 Dose:  600 mg


Metoprolol Tartrate (Lopressor)  50 mg PO Q12 [facility]


   Last Admin: 01/30/17 21:05 Dose:  50 mg


Quetiapine Fumarate (Seroquel)  25 mg PO HS [facility]


   Last Admin: 01/30/17 21:05 Dose:  25 mg











- Labs


Labs: 


 





 12/21/16 08:02 





 12/21/16 08:02 





 











PT  11.2 SECONDS (9.4-12.0)   12/14/15  05:20    


 


INR  1.05  (0.89-1.20)   12/14/15  05:20    


 


APTT  36.2 SECONDS (23.1-32.0)  H  12/14/15  05:20    














- Constitutional


Appears: Well, Non-toxic, No Acute Distress





- Head Exam


Head Exam: ATRAUMATIC, NORMAL INSPECTION, NORMOCEPHALIC





- Eye Exam


Eye Exam: EOMI, Normal appearance, PERRL


Pupil Exam: NORMAL ACCOMODATION, PERRL





- ENT Exam


ENT Exam: Mucous Membranes Moist, Normal Exam





- Neck Exam


Neck Exam: Full ROM, Normal Inspection.  absent: Lymphadenopathy





- Respiratory Exam


Respiratory Exam: Clear to Ausculation Bilateral, NORMAL BREATHING PATTERN





- Cardiovascular Exam


Cardiovascular Exam: REGULAR RHYTHM, RRR, +S1, +S2.  absent: Murmur





- GI/Abdominal Exam


GI & Abdominal Exam: Soft, Normal Bowel Sounds.  absent: Tenderness





- Extremities Exam


Extremities Exam: Full ROM, Normal Capillary Refill, Normal Inspection.  absent

: Joint Swelling, Pedal Edema





- Back Exam


Back Exam: NORMAL INSPECTION





- Neurological Exam


Neurological Exam: Alert, Awake, CN II-XII Intact, Normal Gait, Oriented x3





- Psychiatric Exam


Psychiatric exam: Normal Affect, Normal Mood





- Skin


Skin Exam: Dry, Intact, Normal Color, Warm





Assessment and Plan


(1) DVT prophylaxis


Status: Chronic





(2) CAD (coronary artery disease)


Status: Chronic





(3) Smoking


Status: Chronic





(4) Low back pain


Status: Chronic





(5) Scrotal edema


Status: Chronic





(6) Prediabetes


Status: Chronic





(7) Neurocognitive disorder


Status: Chronic





(8) Leg edema, right


Status: Acute








- Assessment and Plan (Free Text)


Assessment: 


(1) DVT prophylaxis


Assessment & Plan: 


scd and ae hose


ambulation


Status: Chronic





(2) CAD (coronary artery disease)


Assessment & Plan: 


cont meds


Status: Chronic





(3) Smoking


Assessment & Plan: 


nicoderm


Status: Chronic





(4) Low back pain


Assessment & Plan: 


ibuprofen prn


Status: Chronic





(5) Scrotal edema


Status: Chronic





(6) Prediabetes


Assessment & Plan: 


dietary


Status: Chronic





(7) Neurocognitive disorder


Assessment & Plan: 


cont med


pendign placement


Status: Chronic

## 2017-02-01 RX ADMIN — DIVALPROEX SODIUM SCH MG: 500 TABLET, DELAYED RELEASE ORAL at 08:55

## 2017-02-01 RX ADMIN — DIVALPROEX SODIUM SCH MG: 500 TABLET, DELAYED RELEASE ORAL at 17:38

## 2017-02-01 NOTE — CP.PCM.PN
Subjective





- Date & Time of Evaluation


Date of Evaluation: 02/01/17


Time of Evaluation: 08:13





- Subjective


Subjective: 


no f/c, n/v/d


pending placement


no complaints/distress





Objective





- Vital Signs/Intake and Output


Vital Signs (last 24 hours): 


 











Temp Pulse Resp BP Pulse Ox


 


 98.6 F   75   18   147/84   98 


 


 01/31/17 20:19  01/31/17 21:02  01/31/17 20:19  01/31/17 21:02  01/31/17 20:19











- Medications


Medications: 


 Current Medications





Acetaminophen (Tylenol 325mg Tab)  650 mg PO Q6 PRN


   PRN Reason: Fever >100.4 F


   Last Admin: 12/22/16 19:54 Dose:  650 mg


Aspirin (Ecotrin)  81 mg PO DAILY [facility]


   Last Admin: 01/31/17 09:38 Dose:  81 mg


Atorvastatin Calcium (Lipitor)  20 mg PO HS [facility]


   Last Admin: 01/31/17 21:02 Dose:  20 mg


Divalproex Sodium (Depakote Dr(*Bid*))  500 mg PO BID [facility]


   Last Admin: 01/31/17 17:14 Dose:  500 mg


Enalapril Maleate (Vasotec)  10 mg PO DAILY [facility]


   Last Admin: 01/31/17 09:38 Dose:  10 mg


Famotidine (Pepcid)  20 mg PO BID [facility]


   Last Admin: 01/31/17 17:14 Dose:  20 mg


Ibuprofen (Motrin Tab)  600 mg PO Q8 PRN


   PRN Reason: Pain, moderate (4-7)


   Last Admin: 01/06/17 16:32 Dose:  600 mg


Metoprolol Tartrate (Lopressor)  50 mg PO Q12 [facility]


   Last Admin: 01/31/17 21:02 Dose:  50 mg


Quetiapine Fumarate (Seroquel)  25 mg PO HS [facility]


   Last Admin: 01/31/17 21:02 Dose:  25 mg











- Labs


Labs: 


 





 12/21/16 08:02 





 12/21/16 08:02 





 











PT  11.2 SECONDS (9.4-12.0)   12/14/15  05:20    


 


INR  1.05  (0.89-1.20)   12/14/15  05:20    


 


APTT  36.2 SECONDS (23.1-32.0)  H  12/14/15  05:20    














- Constitutional


Appears: Well, Non-toxic, No Acute Distress





- Head Exam


Head Exam: ATRAUMATIC, NORMAL INSPECTION, NORMOCEPHALIC





- Eye Exam


Eye Exam: EOMI, Normal appearance, PERRL


Pupil Exam: NORMAL ACCOMODATION, PERRL





- ENT Exam


ENT Exam: Mucous Membranes Moist, Normal Exam





- Neck Exam


Neck Exam: Full ROM, Normal Inspection.  absent: Lymphadenopathy





- Respiratory Exam


Respiratory Exam: Clear to Ausculation Bilateral, NORMAL BREATHING PATTERN





- Cardiovascular Exam


Cardiovascular Exam: REGULAR RHYTHM, RRR, +S1, +S2.  absent: Murmur





- GI/Abdominal Exam


GI & Abdominal Exam: Soft, Normal Bowel Sounds.  absent: Tenderness





- Extremities Exam


Extremities Exam: Full ROM, Normal Capillary Refill, Normal Inspection.  absent

: Joint Swelling, Pedal Edema





- Back Exam


Back Exam: NORMAL INSPECTION





- Neurological Exam


Neurological Exam: Alert, Awake, CN II-XII Intact, Normal Gait, Oriented x3





- Psychiatric Exam


Psychiatric exam: Normal Affect, Normal Mood





- Skin


Skin Exam: Dry, Intact, Normal Color, Warm





Assessment and Plan


(1) DVT prophylaxis


Status: Chronic





(2) CAD (coronary artery disease)


Status: Chronic





(3) Smoking


Status: Chronic





(4) Low back pain


Status: Chronic





(5) Scrotal edema


Status: Chronic





(6) Prediabetes


Status: Chronic





(7) Neurocognitive disorder


Status: Chronic





(8) Leg edema, right


Status: Acute








- Assessment and Plan (Free Text)


Assessment: 


(1) DVT prophylaxis


Assessment & Plan: 


scd and ae hose


ambulation


Status: Chronic





(2) CAD (coronary artery disease)


Assessment & Plan: 


cont meds


Status: Chronic





(3) Smoking


Assessment & Plan: 


nicoderm


Status: Chronic





(4) Low back pain


Assessment & Plan: 


ibuprofen prn


Status: Chronic





(5) Scrotal edema


Status: Chronic





(6) Prediabetes


Assessment & Plan: 


dietary


Status: Chronic





(7) Neurocognitive disorder


Assessment & Plan: 


cont med


pendign placement


Status: Chronic

## 2017-02-02 LAB
ALBUMIN SERPL-MCNC: 4 G/DL (ref 3.5–5)
ALBUMIN/GLOB SERPL: 1 {RATIO} (ref 1–2.1)
ALT SERPL-CCNC: 20 U/L (ref 21–72)
AMYLASE SERPL-CCNC: 121 U/L (ref 30–110)
AST SERPL-CCNC: 21 U/L (ref 17–59)
BASOPHILS # BLD AUTO: 0 K/UL (ref 0–0.2)
BASOPHILS NFR BLD: 0.2 % (ref 0–2)
BUN SERPL-MCNC: 19 MG/DL (ref 9–20)
CALCIUM SERPL-MCNC: 9.3 MG/DL (ref 8.4–10.2)
EOSINOPHIL # BLD AUTO: 0.4 K/UL (ref 0–0.7)
EOSINOPHIL NFR BLD: 4.8 % (ref 0–4)
ERYTHROCYTE [DISTWIDTH] IN BLOOD BY AUTOMATED COUNT: 13.8 % (ref 11.5–14.5)
GFR NON-AFRICAN AMERICAN: > 60
HGB BLD-MCNC: 12.9 G/DL (ref 12–18)
LIPASE SERPL-CCNC: 116 U/L (ref 23–300)
LYMPHOCYTES # BLD AUTO: 2.8 K/UL (ref 1–4.3)
LYMPHOCYTES NFR BLD AUTO: 29.6 % (ref 20–40)
MCH RBC QN AUTO: 30.4 PG (ref 27–31)
MCHC RBC AUTO-ENTMCNC: 33 G/DL (ref 33–37)
MCV RBC AUTO: 92 FL (ref 80–94)
MONOCYTES # BLD: 0.9 K/UL (ref 0–0.8)
MONOCYTES NFR BLD: 9.1 % (ref 0–10)
NEUTROPHILS # BLD: 5.2 K/UL (ref 1.8–7)
NEUTROPHILS NFR BLD AUTO: 56.3 % (ref 50–75)
NRBC BLD AUTO-RTO: 0 % (ref 0–0)
PLATELET # BLD: 216 K/UL (ref 130–400)
PMV BLD AUTO: 7.4 FL (ref 7.2–11.7)
RBC # BLD AUTO: 4.23 MIL/UL (ref 4.4–5.9)
WBC # BLD AUTO: 9.3 K/UL (ref 4.8–10.8)

## 2017-02-02 RX ADMIN — DIVALPROEX SODIUM SCH MG: 500 TABLET, DELAYED RELEASE ORAL at 16:15

## 2017-02-02 RX ADMIN — METRONIDAZOLE SCH MLS/HR: 500 INJECTION, SOLUTION INTRAVENOUS at 18:00

## 2017-02-02 RX ADMIN — DIVALPROEX SODIUM SCH MG: 500 TABLET, DELAYED RELEASE ORAL at 08:41

## 2017-02-02 RX ADMIN — CIPROFLOXACIN SCH MLS/HR: 2 INJECTION, SOLUTION INTRAVENOUS at 21:06

## 2017-02-02 NOTE — CT
PROCEDURE:  CT Abdomen and Pelvis with Oral contrast.



HISTORY:

llq pain



COMPARISON:

Comparison is made to the previous study dated 02/10/2016



TECHNIQUE:

Contiguous axial images of the abdomen and pelvis. Oral contrast was 

administered. No IV contrast given. Coronal and Sagittal reformats 

generated. 



Radiation dose:



Total exam DLP = 1782.5 mGy-cm.



FINDINGS:



LOWER THORAX:

Again seen is pleural base opacity at the right lung base measures 18 

millimeter in the transverse diameter.  No evidence of pleural 

effusion.



LIVER:

No significant interval change in the liver seen.  Mild hepatomegaly 

is again seen.



GALLBLADDER AND BILE DUCTS:

Unremarkable. 



PANCREAS:

Unremarkable. No mass. No ductal dilatation.



SPLEEN:

Unremarkable. No splenomegaly. 



ADRENALS:

Unremarkable. 



KIDNEYS AND URETERS:

Unremarkable. No stone or hydronephrosis. 



BLADDER:

Grossly unremarkable.



REPRODUCTIVE:

Unremarkable. 



APPENDIX:

Unremarkable.



BOWEL:

Scattered colonic diverticulosis are seen more prominent in the 

descending and sigmoid colon.  Inflammatory changes seen at the 

proximal sigmoid colon at the left lower abdomen consistent with 

acute diverticulitis.  No evidence of perforation or abscess 

formation. 



PERITONEUM:

Unremarkable. No fluid collection. No free air.



LYMPH NODES:

Unremarkable. No enlarged lymph nodes. 



VASCULATURE:

Unremarkable. No aortic aneurysm. 



BONES:

No fracture or destructive lesion. 



OTHER FINDINGS:

There is large amount of fluid in the left scrotum could be due to 

left inguinal hernia versus large hydrocele. 



IMPRESSION:

Findings consistent with sigmoid diverticulitis.  No evidence of 

perforation or abscess formation.



Scattered colonic diverticulosis.



Fluid collection versus cystic structure seen in the scrotum likely 

hydrocele.



No evidence of acute pathology in the upper abdomen.



Re- demonstration of pleural based nodule at the right lung base.

## 2017-02-03 RX ADMIN — CIPROFLOXACIN SCH MLS/HR: 2 INJECTION, SOLUTION INTRAVENOUS at 08:32

## 2017-02-03 RX ADMIN — DIVALPROEX SODIUM SCH: 500 TABLET, DELAYED RELEASE ORAL at 16:59

## 2017-02-03 RX ADMIN — DIVALPROEX SODIUM SCH MG: 500 TABLET, DELAYED RELEASE ORAL at 17:27

## 2017-02-03 RX ADMIN — METRONIDAZOLE SCH MLS/HR: 500 INJECTION, SOLUTION INTRAVENOUS at 08:33

## 2017-02-03 RX ADMIN — METRONIDAZOLE SCH MLS/HR: 500 INJECTION, SOLUTION INTRAVENOUS at 01:08

## 2017-02-03 RX ADMIN — DIVALPROEX SODIUM SCH MG: 500 TABLET, DELAYED RELEASE ORAL at 08:34

## 2017-02-03 RX ADMIN — METRONIDAZOLE SCH MLS/HR: 500 INJECTION, SOLUTION INTRAVENOUS at 16:58

## 2017-02-03 RX ADMIN — CIPROFLOXACIN SCH MLS/HR: 2 INJECTION, SOLUTION INTRAVENOUS at 20:52

## 2017-02-03 NOTE — CP.PCM.PN
Subjective





- Date & Time of Evaluation


Date of Evaluation: 02/03/17


Time of Evaluation: 14:11





- Subjective


Subjective: 


no f/c, n/v/d


po tolerant


no distress/complaints


pending placement





Objective





- Vital Signs/Intake and Output


Vital Signs (last 24 hours): 


 











Temp Pulse Resp BP Pulse Ox


 


 97.7 F   75   20   110/75   100 


 


 02/03/17 08:05  02/03/17 08:35  02/03/17 08:05  02/03/17 08:35  02/03/17 08:05











- Medications


Medications: 


 Current Medications





Acetaminophen (Tylenol 325mg Tab)  650 mg PO Q6 PRN


   PRN Reason: Fever >100.4 F


   Last Admin: 12/22/16 19:54 Dose:  650 mg


Aspirin (Ecotrin)  81 mg PO DAILY [facility]


   Last Admin: 02/03/17 08:34 Dose:  81 mg


Atorvastatin Calcium (Lipitor)  20 mg PO HS [facility]


   Last Admin: 02/02/17 21:07 Dose:  20 mg


Divalproex Sodium (Depakote Dr(*Bid*))  500 mg PO BID [facility]


   Last Admin: 02/03/17 08:34 Dose:  500 mg


Enalapril Maleate (Vasotec)  10 mg PO DAILY [facility]


   Last Admin: 02/03/17 08:36 Dose:  10 mg


Famotidine (Pepcid)  20 mg PO BID [facility]


   Last Admin: 02/03/17 08:36 Dose:  20 mg


Ciprofloxacin (Cipro 400mg/200ml Dsw)  200 mls @ 200 mls/hr IVPB Q12 [facility]


   Last Admin: 02/03/17 08:32 Dose:  200 mls/hr


Metronidazole 250 mg/ (Miscellaneous)  50 mls @ 50 mls/hr IVPB Q8 [facility]


   Last Admin: 02/03/17 08:33 Dose:  50 mls/hr


Ibuprofen (Motrin Tab)  600 mg PO Q8 PRN


   PRN Reason: Pain, moderate (4-7)


   Last Admin: 01/06/17 16:32 Dose:  600 mg


Ketorolac Tromethamine (Toradol)  30 mg IVP Q6 PRN


   PRN Reason: pain 2-10


Metoprolol Tartrate (Lopressor)  50 mg PO Q12 [facility]


   Last Admin: 02/03/17 08:35 Dose:  50 mg


Quetiapine Fumarate (Seroquel)  25 mg PO HS [facility]


   Last Admin: 02/02/17 21:07 Dose:  25 mg











- Labs


Labs: 


 





 02/02/17 10:12 





 02/02/17 10:12 





 











PT  11.2 SECONDS (9.4-12.0)   12/14/15  05:20    


 


INR  1.05  (0.89-1.20)   12/14/15  05:20    


 


APTT  36.2 SECONDS (23.1-32.0)  H  12/14/15  05:20    














- Constitutional


Appears: Well, Non-toxic, No Acute Distress





- Head Exam


Head Exam: ATRAUMATIC, NORMAL INSPECTION, NORMOCEPHALIC





- Eye Exam


Eye Exam: EOMI, Normal appearance, PERRL


Pupil Exam: NORMAL ACCOMODATION, PERRL





- ENT Exam


ENT Exam: Mucous Membranes Moist, Normal Exam





- Neck Exam


Neck Exam: Full ROM, Normal Inspection.  absent: Lymphadenopathy





- Respiratory Exam


Respiratory Exam: Clear to Ausculation Bilateral, NORMAL BREATHING PATTERN





- Cardiovascular Exam


Cardiovascular Exam: REGULAR RHYTHM, +S1, +S2.  absent: Murmur





- GI/Abdominal Exam


GI & Abdominal Exam: Soft, Normal Bowel Sounds.  absent: Tenderness





- Extremities Exam


Extremities Exam: Full ROM, Normal Capillary Refill, Normal Inspection.  absent

: Joint Swelling, Pedal Edema





- Back Exam


Back Exam: NORMAL INSPECTION





- Neurological Exam


Neurological Exam: Alert, Awake, CN II-XII Intact, Normal Gait, Oriented x3





- Psychiatric Exam


Psychiatric exam: Normal Affect, Normal Mood





- Skin


Skin Exam: Dry, Intact, Normal Color, Warm





Assessment and Plan


(1) DVT prophylaxis


Status: Chronic





(2) CAD (coronary artery disease)


Status: Chronic





(3) Smoking


Status: Chronic





(4) Low back pain


Status: Chronic





(5) Scrotal edema


Status: Chronic





(6) Prediabetes


Status: Chronic





(7) Neurocognitive disorder


Status: Chronic





(8) Leg edema, right


Status: Acute





(9) LLQ pain


Status: Acute








- Assessment and Plan (Free Text)


Assessment: 


(1) DVT prophylaxis


Assessment & Plan: 


scd and ae hose


ambulation


Status: Chronic





(2) CAD (coronary artery disease)


Assessment & Plan: 


cont meds


Status: Chronic





(3) Smoking


Assessment & Plan: 


nicoderm


Status: Chronic





(4) Low back pain


Assessment & Plan: 


ibuprofen prn


Status: Chronic





(5) Scrotal edema


Status: Chronic





(6) Prediabetes


Assessment & Plan: 


dietary


Status: Chronic





(7) Neurocognitive disorder


Assessment & Plan: 


cont med


pendign placement


Status: Chronic


diverticulitits-cipro/falgyl


po as tolerated

## 2017-02-04 RX ADMIN — CIPROFLOXACIN SCH MLS/HR: 2 INJECTION, SOLUTION INTRAVENOUS at 21:08

## 2017-02-04 RX ADMIN — METRONIDAZOLE SCH MLS/HR: 500 INJECTION, SOLUTION INTRAVENOUS at 00:17

## 2017-02-04 RX ADMIN — DIVALPROEX SODIUM SCH MG: 500 TABLET, DELAYED RELEASE ORAL at 09:31

## 2017-02-04 RX ADMIN — METRONIDAZOLE SCH MLS/HR: 500 INJECTION, SOLUTION INTRAVENOUS at 16:28

## 2017-02-04 RX ADMIN — DIVALPROEX SODIUM SCH MG: 500 TABLET, DELAYED RELEASE ORAL at 16:28

## 2017-02-04 RX ADMIN — METRONIDAZOLE SCH MLS/HR: 500 INJECTION, SOLUTION INTRAVENOUS at 09:32

## 2017-02-04 RX ADMIN — CIPROFLOXACIN SCH MLS/HR: 2 INJECTION, SOLUTION INTRAVENOUS at 09:31

## 2017-02-04 NOTE — CP.PCM.PN
Subjective





- Date & Time of Evaluation


Date of Evaluation: 02/04/17


Time of Evaluation: 19:02





- Subjective


Subjective: 


no f/c, n/v/d. no complaitnds/distress


pending placement


po tolerant





Objective





- Vital Signs/Intake and Output


Vital Signs (last 24 hours): 


 











Temp Pulse Resp BP Pulse Ox


 


 98.7 F   73   18   123/79   99 


 


 02/04/17 16:45  02/04/17 16:45  02/04/17 16:45  02/04/17 16:45  02/04/17 16:45











- Medications


Medications: 


 Current Medications





Acetaminophen (Tylenol 325mg Tab)  650 mg PO Q6 PRN


   PRN Reason: Fever >100.4 F


   Last Admin: 12/22/16 19:54 Dose:  650 mg


Aspirin (Ecotrin)  81 mg PO DAILY [facility]


   Last Admin: 02/04/17 09:32 Dose:  81 mg


Atorvastatin Calcium (Lipitor)  20 mg PO HS [facility]


   Last Admin: 02/03/17 21:01 Dose:  20 mg


Divalproex Sodium (Depakote Dr(*Bid*))  500 mg PO BID [facility]


   Last Admin: 02/04/17 16:28 Dose:  500 mg


Enalapril Maleate (Vasotec)  10 mg PO DAILY [facility]


   Last Admin: 02/04/17 09:33 Dose:  10 mg


Famotidine (Pepcid)  20 mg PO BID [facility]


   Last Admin: 02/04/17 16:29 Dose:  20 mg


Ciprofloxacin (Cipro 400mg/200ml Dsw)  200 mls @ 200 mls/hr IVPB Q12 [facility]


   Last Admin: 02/04/17 09:31 Dose:  200 mls/hr


Metronidazole 250 mg/ (Miscellaneous)  50 mls @ 50 mls/hr IVPB Q8 [facility]


   Last Admin: 02/04/17 16:28 Dose:  50 mls/hr


Ibuprofen (Motrin Tab)  600 mg PO Q8 PRN


   PRN Reason: Pain, moderate (4-7)


   Last Admin: 01/06/17 16:32 Dose:  600 mg


Ketorolac Tromethamine (Toradol)  30 mg IVP Q6 PRN


   PRN Reason: pain 2-10


Metoprolol Tartrate (Lopressor)  50 mg PO Q12 [facility]


   Last Admin: 02/04/17 09:32 Dose:  50 mg


Quetiapine Fumarate (Seroquel)  25 mg PO HS [facility]


   Last Admin: 02/03/17 21:01 Dose:  25 mg











- Labs


Labs: 


 





 02/02/17 10:12 





 02/02/17 10:12 





 











PT  11.2 SECONDS (9.4-12.0)   12/14/15  05:20    


 


INR  1.05  (0.89-1.20)   12/14/15  05:20    


 


APTT  36.2 SECONDS (23.1-32.0)  H  12/14/15  05:20    














- Constitutional


Appears: Well, Non-toxic, No Acute Distress





- Head Exam


Head Exam: ATRAUMATIC, NORMAL INSPECTION, NORMOCEPHALIC





- Eye Exam


Eye Exam: EOMI, Normal appearance, PERRL


Pupil Exam: NORMAL ACCOMODATION, PERRL





- ENT Exam


ENT Exam: Mucous Membranes Moist, Normal Exam





- Neck Exam


Neck Exam: Full ROM, Normal Inspection.  absent: Lymphadenopathy





- Respiratory Exam


Respiratory Exam: Clear to Ausculation Bilateral, NORMAL BREATHING PATTERN





- Cardiovascular Exam


Cardiovascular Exam: REGULAR RHYTHM, RRR, +S1, +S2.  absent: Murmur





- GI/Abdominal Exam


GI & Abdominal Exam: Soft, Normal Bowel Sounds.  absent: Tenderness





- Rectal Exam


Rectal Exam: NORMAL INSPECTION





- Extremities Exam


Extremities Exam: Full ROM, Normal Capillary Refill, Normal Inspection.  absent

: Joint Swelling, Pedal Edema





- Back Exam


Back Exam: NORMAL INSPECTION





- Neurological Exam


Neurological Exam: Alert, Awake, CN II-XII Intact, Normal Gait, Oriented x3





- Psychiatric Exam


Psychiatric exam: Normal Affect, Normal Mood





- Skin


Skin Exam: Dry, Intact, Normal Color, Warm





Assessment and Plan


(1) DVT prophylaxis


Status: Chronic





(2) CAD (coronary artery disease)


Status: Chronic





(3) Smoking


Status: Chronic





(4) Low back pain


Status: Chronic





(5) Scrotal edema


Status: Chronic





(6) Prediabetes


Status: Chronic





(7) Neurocognitive disorder


Status: Chronic





(8) Leg edema, right


Status: Acute





(9) LLQ pain


Status: Acute

## 2017-02-05 RX ADMIN — METRONIDAZOLE SCH MLS/HR: 500 INJECTION, SOLUTION INTRAVENOUS at 09:52

## 2017-02-05 RX ADMIN — DIVALPROEX SODIUM SCH MG: 500 TABLET, DELAYED RELEASE ORAL at 16:32

## 2017-02-05 RX ADMIN — CIPROFLOXACIN SCH MLS/HR: 2 INJECTION, SOLUTION INTRAVENOUS at 09:52

## 2017-02-05 RX ADMIN — METRONIDAZOLE SCH MLS/HR: 500 INJECTION, SOLUTION INTRAVENOUS at 16:31

## 2017-02-05 RX ADMIN — CIPROFLOXACIN SCH MLS/HR: 2 INJECTION, SOLUTION INTRAVENOUS at 20:00

## 2017-02-05 RX ADMIN — DIVALPROEX SODIUM SCH MG: 500 TABLET, DELAYED RELEASE ORAL at 09:53

## 2017-02-05 RX ADMIN — METRONIDAZOLE SCH MLS/HR: 500 INJECTION, SOLUTION INTRAVENOUS at 00:45

## 2017-02-05 NOTE — CP.PCM.PN
Subjective





- Date & Time of Evaluation


Date of Evaluation: 02/05/17


Time of Evaluation: 08:27





- Subjective


Subjective: 


no f/c n/v/d


pendin gplacement


po tolerate


no complaints/distress


intermittent pain to llq





Objective





- Vital Signs/Intake and Output


Vital Signs (last 24 hours): 


 











Temp Pulse Resp BP Pulse Ox


 


 98.2 F   76   20   95/63 L  96 


 


 02/05/17 08:00  02/05/17 08:00  02/05/17 08:00  02/05/17 08:00  02/05/17 08:00











- Medications


Medications: 


 Current Medications





Acetaminophen (Tylenol 325mg Tab)  650 mg PO Q6 PRN


   PRN Reason: Fever >100.4 F


   Last Admin: 12/22/16 19:54 Dose:  650 mg


Aspirin (Ecotrin)  81 mg PO DAILY [facility]


   Last Admin: 02/04/17 09:32 Dose:  81 mg


Atorvastatin Calcium (Lipitor)  20 mg PO HS [facility]


   Last Admin: 02/04/17 21:07 Dose:  20 mg


Divalproex Sodium (Depakote Dr(*Bid*))  500 mg PO BID [facility]


   Last Admin: 02/04/17 16:28 Dose:  500 mg


Enalapril Maleate (Vasotec)  10 mg PO DAILY [facility]


   Last Admin: 02/04/17 09:33 Dose:  10 mg


Famotidine (Pepcid)  20 mg PO BID [facility]


   Last Admin: 02/04/17 16:29 Dose:  20 mg


Ciprofloxacin (Cipro 400mg/200ml Dsw)  200 mls @ 200 mls/hr IVPB Q12 [facility]


   Last Admin: 02/04/17 21:08 Dose:  200 mls/hr


Metronidazole 250 mg/ (Miscellaneous)  50 mls @ 50 mls/hr IVPB Q8 [facility]


   Last Admin: 02/05/17 00:45 Dose:  50 mls/hr


Ibuprofen (Motrin Tab)  600 mg PO Q8 PRN


   PRN Reason: Pain, moderate (4-7)


   Last Admin: 01/06/17 16:32 Dose:  600 mg


Ketorolac Tromethamine (Toradol)  30 mg IVP Q6 PRN


   PRN Reason: pain 2-10


Metoprolol Tartrate (Lopressor)  50 mg PO Q12 [facility]


   Last Admin: 02/04/17 21:07 Dose:  50 mg


Quetiapine Fumarate (Seroquel)  25 mg PO HS [facility]


   Last Admin: 02/04/17 21:06 Dose:  25 mg











- Labs


Labs: 


 





 02/02/17 10:12 





 02/02/17 10:12 





 











PT  11.2 SECONDS (9.4-12.0)   12/14/15  05:20    


 


INR  1.05  (0.89-1.20)   12/14/15  05:20    


 


APTT  36.2 SECONDS (23.1-32.0)  H  12/14/15  05:20    














- Constitutional


Appears: Well, Non-toxic, No Acute Distress





- Head Exam


Head Exam: ATRAUMATIC, NORMAL INSPECTION, NORMOCEPHALIC





- Eye Exam


Eye Exam: EOMI, Normal appearance, PERRL


Pupil Exam: NORMAL ACCOMODATION, PERRL





- ENT Exam


ENT Exam: Mucous Membranes Moist, Normal Exam





- Neck Exam


Neck Exam: Full ROM, Normal Inspection.  absent: Lymphadenopathy





- Respiratory Exam


Respiratory Exam: Clear to Ausculation Bilateral, NORMAL BREATHING PATTERN





- Cardiovascular Exam


Cardiovascular Exam: REGULAR RHYTHM, RRR, +S1, +S2.  absent: Murmur





- GI/Abdominal Exam


GI & Abdominal Exam: Soft, Normal Bowel Sounds.  absent: Tenderness





- Extremities Exam


Extremities Exam: Full ROM, Normal Capillary Refill, Normal Inspection.  absent

: Joint Swelling, Pedal Edema





- Back Exam


Back Exam: NORMAL INSPECTION





- Neurological Exam


Neurological Exam: Alert, Awake, CN II-XII Intact, Normal Gait, Oriented x3





- Psychiatric Exam


Psychiatric exam: Normal Affect, Normal Mood





- Skin


Skin Exam: Dry, Intact, Normal Color, Warm





Assessment and Plan


(1) DVT prophylaxis


Status: Chronic





(2) CAD (coronary artery disease)


Status: Chronic





(3) Smoking


Status: Chronic





(4) Low back pain


Status: Chronic





(5) Scrotal edema


Status: Chronic





(6) Prediabetes


Status: Chronic





(7) Neurocognitive disorder


Status: Chronic





(8) Leg edema, right


Status: Acute





(9) LLQ pain


Status: Acute





(10) Diverticulitis


Status: Resolved








- Assessment and Plan (Free Text)


Assessment: 


(1) DVT prophylaxis


Assessment & Plan: 


scd and ae hose


ambulation


Status: Chronic





(2) CAD (coronary artery disease)


Assessment & Plan: 


cont meds


Status: Chronic





(3) Smoking


Assessment & Plan: 


nicoderm


Status: Chronic





(4) Low back pain


Assessment & Plan: 


ibuprofen prn


Status: Chronic





(5) Scrotal edema


Status: Chronic





(6) Prediabetes


Assessment & Plan: 


dietary


Status: Chronic





(7) Neurocognitive disorder


Assessment & Plan: 


cont med


pendign placement


Status: Chronic


diverticulitits-cipro/falgyl


po as tolerated

## 2017-02-06 RX ADMIN — METRONIDAZOLE SCH MLS/HR: 500 INJECTION, SOLUTION INTRAVENOUS at 08:49

## 2017-02-06 RX ADMIN — METRONIDAZOLE SCH MLS/HR: 500 INJECTION, SOLUTION INTRAVENOUS at 16:54

## 2017-02-06 RX ADMIN — CIPROFLOXACIN SCH MLS/HR: 2 INJECTION, SOLUTION INTRAVENOUS at 08:50

## 2017-02-06 RX ADMIN — DIVALPROEX SODIUM SCH MG: 500 TABLET, DELAYED RELEASE ORAL at 16:53

## 2017-02-06 RX ADMIN — DIVALPROEX SODIUM SCH MG: 500 TABLET, DELAYED RELEASE ORAL at 08:47

## 2017-02-06 RX ADMIN — METRONIDAZOLE SCH MLS/HR: 500 INJECTION, SOLUTION INTRAVENOUS at 00:09

## 2017-02-06 RX ADMIN — CIPROFLOXACIN SCH MLS/HR: 2 INJECTION, SOLUTION INTRAVENOUS at 20:05

## 2017-02-06 NOTE — CP.PCM.PN
Subjective





- Date & Time of Evaluation


Date of Evaluation: 02/06/17


Time of Evaluation: 07:25





- Subjective


Subjective: 


no complaints/distress


no f/c, n/v/d


po tolerant


pending placement





Objective





- Vital Signs/Intake and Output


Vital Signs (last 24 hours): 


 











Temp Pulse Resp BP Pulse Ox


 


 98.3 F   73   18   111/76   98 


 


 02/05/17 21:28  02/05/17 21:28  02/05/17 21:19  02/05/17 21:28  02/05/17 21:19











- Medications


Medications: 


 Current Medications





Acetaminophen (Tylenol 325mg Tab)  650 mg PO Q6 PRN


   PRN Reason: Fever >100.4 F


   Last Admin: 12/22/16 19:54 Dose:  650 mg


Aspirin (Ecotrin)  81 mg PO DAILY [facility]


   Last Admin: 02/05/17 09:54 Dose:  81 mg


Atorvastatin Calcium (Lipitor)  20 mg PO HS [facility]


   Last Admin: 02/05/17 21:06 Dose:  20 mg


Divalproex Sodium (Depakote Dr(*Bid*))  500 mg PO BID [facility]


   Last Admin: 02/05/17 16:32 Dose:  500 mg


Enalapril Maleate (Vasotec)  10 mg PO DAILY [facility]


   Last Admin: 02/05/17 10:24 Dose:  10 mg


Famotidine (Pepcid)  20 mg PO BID [facility]


   Last Admin: 02/05/17 16:32 Dose:  20 mg


Ciprofloxacin (Cipro 400mg/200ml Dsw)  200 mls @ 200 mls/hr IVPB Q12 [facility]


   Last Admin: 02/05/17 20:00 Dose:  200 mls/hr


Metronidazole 250 mg/ (Miscellaneous)  50 mls @ 50 mls/hr IVPB Q8 [facility]


   Last Admin: 02/06/17 00:09 Dose:  50 mls/hr


Ibuprofen (Motrin Tab)  600 mg PO Q8 PRN


   PRN Reason: Pain, moderate (4-7)


   Last Admin: 01/06/17 16:32 Dose:  600 mg


Ketorolac Tromethamine (Toradol)  30 mg IVP Q6 PRN


   PRN Reason: pain 2-10


Metoprolol Tartrate (Lopressor)  50 mg PO Q12 [facility]


   Last Admin: 02/05/17 21:06 Dose:  50 mg


Quetiapine Fumarate (Seroquel)  25 mg PO HS [facility]


   Last Admin: 02/05/17 21:06 Dose:  25 mg











- Labs


Labs: 


 





 02/02/17 10:12 





 02/02/17 10:12 





 











PT  11.2 SECONDS (9.4-12.0)   12/14/15  05:20    


 


INR  1.05  (0.89-1.20)   12/14/15  05:20    


 


APTT  36.2 SECONDS (23.1-32.0)  H  12/14/15  05:20    














- Constitutional


Appears: Well, Non-toxic, No Acute Distress





- Head Exam


Head Exam: ATRAUMATIC, NORMAL INSPECTION, NORMOCEPHALIC





- Eye Exam


Eye Exam: EOMI, Normal appearance, PERRL


Pupil Exam: NORMAL ACCOMODATION, PERRL





- ENT Exam


ENT Exam: Mucous Membranes Moist, Normal Exam





- Neck Exam


Neck Exam: Full ROM, Normal Inspection.  absent: Lymphadenopathy





- Respiratory Exam


Respiratory Exam: Clear to Ausculation Bilateral, NORMAL BREATHING PATTERN





- Cardiovascular Exam


Cardiovascular Exam: REGULAR RHYTHM, RRR, +S1, +S2.  absent: Murmur





- GI/Abdominal Exam


GI & Abdominal Exam: Soft, Normal Bowel Sounds.  absent: Tenderness





- Extremities Exam


Extremities Exam: Full ROM, Normal Capillary Refill, Normal Inspection.  absent

: Joint Swelling, Pedal Edema





- Back Exam


Back Exam: NORMAL INSPECTION





- Neurological Exam


Neurological Exam: Alert, Awake, CN II-XII Intact, Normal Gait, Oriented x3





- Psychiatric Exam


Psychiatric exam: Normal Affect, Normal Mood





- Skin


Skin Exam: Dry, Intact, Normal Color, Warm





Assessment and Plan


(1) DVT prophylaxis


Status: Chronic





(2) CAD (coronary artery disease)


Status: Chronic





(3) Smoking


Status: Chronic





(4) Low back pain


Status: Chronic





(5) Scrotal edema


Status: Chronic





(6) Prediabetes


Status: Chronic





(7) Neurocognitive disorder


Status: Chronic





(8) Leg edema, right


Status: Acute





(9) LLQ pain


Status: Acute





(10) Diverticulitis


Status: Resolved








- Assessment and Plan (Free Text)


Assessment: 


(1) DVT prophylaxis


Assessment & Plan: 


scd and ae hose


ambulation


Status: Chronic





(2) CAD (coronary artery disease)


Assessment & Plan: 


cont meds


Status: Chronic





(3) Smoking


Assessment & Plan: 


nicoderm


Status: Chronic





(4) Low back pain


Assessment & Plan: 


ibuprofen prn


Status: Chronic





(5) Scrotal edema


Status: Chronic





(6) Prediabetes


Assessment & Plan: 


dietary


Status: Chronic





(7) Neurocognitive disorder


Assessment & Plan: 


cont med


pendign placement


Status: Chronic


diverticulitits-cipro/falgyl


po as tolerated

## 2017-02-07 RX ADMIN — CIPROFLOXACIN SCH MLS/HR: 2 INJECTION, SOLUTION INTRAVENOUS at 21:35

## 2017-02-07 RX ADMIN — METRONIDAZOLE SCH MLS/HR: 500 INJECTION, SOLUTION INTRAVENOUS at 17:03

## 2017-02-07 RX ADMIN — CIPROFLOXACIN SCH MLS/HR: 2 INJECTION, SOLUTION INTRAVENOUS at 09:23

## 2017-02-07 RX ADMIN — METRONIDAZOLE SCH MLS/HR: 500 INJECTION, SOLUTION INTRAVENOUS at 08:29

## 2017-02-07 RX ADMIN — DIVALPROEX SODIUM SCH MG: 500 TABLET, DELAYED RELEASE ORAL at 17:00

## 2017-02-07 RX ADMIN — DIVALPROEX SODIUM SCH MG: 500 TABLET, DELAYED RELEASE ORAL at 08:30

## 2017-02-07 RX ADMIN — METRONIDAZOLE SCH MLS/HR: 500 INJECTION, SOLUTION INTRAVENOUS at 00:00

## 2017-02-07 NOTE — CP.PCM.PN
Subjective





- Date & Time of Evaluation


Date of Evaluation: 02/07/17


Time of Evaluation: 07:54





- Subjective


Subjective: 


no complaints/distress


 po tolerant


no f/c, n/v/d


pending placement





Objective





- Vital Signs/Intake and Output


Vital Signs (last 24 hours): 


 











Temp Pulse Resp BP Pulse Ox


 


 98.5 F   85   20   131/87   98 


 


 02/06/17 20:59  02/06/17 21:05  02/06/17 20:59  02/06/17 21:05  02/06/17 20:59











- Medications


Medications: 


 Current Medications





Acetaminophen (Tylenol 325mg Tab)  650 mg PO Q6 PRN


   PRN Reason: Fever >100.4 F


   Last Admin: 12/22/16 19:54 Dose:  650 mg


Aspirin (Ecotrin)  81 mg PO DAILY [facility]


   Last Admin: 02/06/17 08:47 Dose:  81 mg


Atorvastatin Calcium (Lipitor)  20 mg PO HS [facility]


   Last Admin: 02/06/17 21:05 Dose:  20 mg


Divalproex Sodium (Depakote Dr(*Bid*))  500 mg PO BID [facility]


   Last Admin: 02/06/17 16:53 Dose:  500 mg


Enalapril Maleate (Vasotec)  10 mg PO DAILY [facility]


   Last Admin: 02/06/17 08:49 Dose:  10 mg


Famotidine (Pepcid)  20 mg PO BID [facility]


   Last Admin: 02/06/17 16:53 Dose:  20 mg


Metronidazole 250 mg/ (Miscellaneous)  50 mls @ 50 mls/hr IVPB Q8 [facility]


   Last Admin: 02/07/17 00:00 Dose:  50 mls/hr


Ciprofloxacin (Cipro 400mg/200ml Dsw)  200 mls @ 200 mls/hr IVPB Q12@1000,2200 

[facility]


Ibuprofen (Motrin Tab)  600 mg PO Q8 PRN


   PRN Reason: Pain, moderate (4-7)


   Last Admin: 01/06/17 16:32 Dose:  600 mg


Ketorolac Tromethamine (Toradol)  30 mg IVP Q6 PRN


   PRN Reason: pain 2-10


Metoprolol Tartrate (Lopressor)  50 mg PO Q12 [facility]


   Last Admin: 02/06/17 21:05 Dose:  50 mg


Quetiapine Fumarate (Seroquel)  25 mg PO HS [facility]


   Last Admin: 02/06/17 21:06 Dose:  25 mg











- Labs


Labs: 


 





 02/02/17 10:12 





 02/02/17 10:12 





 











PT  11.2 SECONDS (9.4-12.0)   12/14/15  05:20    


 


INR  1.05  (0.89-1.20)   12/14/15  05:20    


 


APTT  36.2 SECONDS (23.1-32.0)  H  12/14/15  05:20    














- Constitutional


Appears: Well, Non-toxic, No Acute Distress





- Head Exam


Head Exam: ATRAUMATIC, NORMAL INSPECTION, NORMOCEPHALIC





- Eye Exam


Eye Exam: EOMI, Normal appearance, PERRL


Pupil Exam: NORMAL ACCOMODATION, PERRL





- ENT Exam


ENT Exam: Mucous Membranes Moist, Normal Exam





- Neck Exam


Neck Exam: Full ROM, Normal Inspection.  absent: Lymphadenopathy





- Respiratory Exam


Respiratory Exam: Clear to Ausculation Bilateral, NORMAL BREATHING PATTERN





- Cardiovascular Exam


Cardiovascular Exam: REGULAR RHYTHM, +S1, +S2.  absent: Murmur





- GI/Abdominal Exam


GI & Abdominal Exam: Soft, Normal Bowel Sounds.  absent: Tenderness





- Extremities Exam


Extremities Exam: Full ROM, Normal Capillary Refill, Normal Inspection.  absent

: Joint Swelling, Pedal Edema





- Back Exam


Back Exam: NORMAL INSPECTION





- Neurological Exam


Neurological Exam: Alert, Awake, CN II-XII Intact, Normal Gait, Oriented x3





- Psychiatric Exam


Psychiatric exam: Normal Affect, Normal Mood





- Skin


Skin Exam: Dry, Intact, Normal Color, Warm





Assessment and Plan


(1) DVT prophylaxis


Status: Chronic





(2) CAD (coronary artery disease)


Status: Chronic





(3) Smoking


Status: Chronic





(4) Low back pain


Status: Chronic





(5) Scrotal edema


Status: Chronic





(6) Prediabetes


Status: Chronic





(7) Neurocognitive disorder


Status: Chronic





(8) Leg edema, right


Status: Acute





(9) LLQ pain


Status: Acute





(10) Diverticulitis


Status: Acute








- Assessment and Plan (Free Text)


Assessment: 


(1) DVT prophylaxis


Assessment & Plan: 


scd and ae hose


ambulation


Status: Chronic





(2) CAD (coronary artery disease)


Assessment & Plan: 


cont meds


Status: Chronic





(3) Smoking


Assessment & Plan: 


nicoderm


Status: Chronic





(4) Low back pain


Assessment & Plan: 


ibuprofen prn


Status: Chronic





(5) Scrotal edema


Status: Chronic





(6) Prediabetes


Assessment & Plan: 


dietary


Status: Chronic





(7) Neurocognitive disorder


Assessment & Plan: 


cont med


pendign placement


Status: Chronic


diverticulitits-cipro/falgyl


po as tolerated

## 2017-02-08 RX ADMIN — METRONIDAZOLE SCH MLS/HR: 500 INJECTION, SOLUTION INTRAVENOUS at 09:00

## 2017-02-08 RX ADMIN — CIPROFLOXACIN SCH MLS/HR: 2 INJECTION, SOLUTION INTRAVENOUS at 10:25

## 2017-02-08 RX ADMIN — DIVALPROEX SODIUM SCH MG: 500 TABLET, DELAYED RELEASE ORAL at 10:25

## 2017-02-08 RX ADMIN — CIPROFLOXACIN SCH MLS/HR: 2 INJECTION, SOLUTION INTRAVENOUS at 21:25

## 2017-02-08 RX ADMIN — DIVALPROEX SODIUM SCH MG: 500 TABLET, DELAYED RELEASE ORAL at 16:28

## 2017-02-08 RX ADMIN — METRONIDAZOLE SCH MLS/HR: 500 INJECTION, SOLUTION INTRAVENOUS at 00:05

## 2017-02-08 RX ADMIN — METRONIDAZOLE SCH MLS/HR: 500 INJECTION, SOLUTION INTRAVENOUS at 16:56

## 2017-02-08 NOTE — CP.PCM.PN
Subjective





- Date & Time of Evaluation


Date of Evaluation: 02/08/17


Time of Evaluation: 08:19





- Subjective


Subjective: 


no f/,c n/v/d


no copmlaints/distress


pending placement


po tolerant





Objective





- Vital Signs/Intake and Output


Vital Signs (last 24 hours): 


 











Temp Pulse Resp BP Pulse Ox


 


 98.4 F   80   20   97/65 L  96 


 


 02/08/17 08:05  02/08/17 08:05  02/08/17 08:05  02/08/17 08:05  02/08/17 08:05











- Medications


Medications: 


 Current Medications





Acetaminophen (Tylenol 325mg Tab)  650 mg PO Q6 PRN


   PRN Reason: Fever >100.4 F


   Last Admin: 12/22/16 19:54 Dose:  650 mg


Aspirin (Ecotrin)  81 mg PO DAILY [facility]


   Last Admin: 02/07/17 08:30 Dose:  81 mg


Atorvastatin Calcium (Lipitor)  20 mg PO HS [facility]


   Last Admin: 02/07/17 21:32 Dose:  20 mg


Divalproex Sodium (Depakote Dr(*Bid*))  500 mg PO BID [facility]


   Last Admin: 02/07/17 17:00 Dose:  500 mg


Docusate Sodium (Colace)  100 mg PO DAILY [facility]


   Last Admin: 02/07/17 11:58 Dose:  100 mg


Enalapril Maleate (Vasotec)  10 mg PO DAILY [facility]


   Last Admin: 02/07/17 08:36 Dose:  10 mg


Famotidine (Pepcid)  20 mg PO BID [facility]


   Last Admin: 02/07/17 17:00 Dose:  20 mg


Metronidazole 250 mg/ (Miscellaneous)  50 mls @ 50 mls/hr IVPB Q8 [facility]


   Last Admin: 02/08/17 00:05 Dose:  50 mls/hr


Ciprofloxacin (Cipro 400mg/200ml Dsw)  200 mls @ 200 mls/hr IVPB Q12@1000,2200 

[facility]


   Last Admin: 02/07/17 21:35 Dose:  200 mls/hr


Ibuprofen (Motrin Tab)  600 mg PO Q8 PRN


   PRN Reason: Pain, moderate (4-7)


   Last Admin: 01/06/17 16:32 Dose:  600 mg


Ketorolac Tromethamine (Toradol)  30 mg IVP Q6 PRN


   PRN Reason: pain 2-10


Metoprolol Tartrate (Lopressor)  50 mg PO Q12 [facility]


   Last Admin: 02/07/17 21:32 Dose:  50 mg


Quetiapine Fumarate (Seroquel)  25 mg PO HS [facility]


   Last Admin: 02/07/17 21:31 Dose:  25 mg











- Labs


Labs: 


 





 02/02/17 10:12 





 02/02/17 10:12 





 











PT  11.2 SECONDS (9.4-12.0)   12/14/15  05:20    


 


INR  1.05  (0.89-1.20)   12/14/15  05:20    


 


APTT  36.2 SECONDS (23.1-32.0)  H  12/14/15  05:20    














- Constitutional


Appears: Well, Non-toxic, No Acute Distress





- Head Exam


Head Exam: ATRAUMATIC, NORMAL INSPECTION, NORMOCEPHALIC





- Eye Exam


Eye Exam: EOMI, Normal appearance, PERRL


Pupil Exam: NORMAL ACCOMODATION, PERRL





- ENT Exam


ENT Exam: Mucous Membranes Moist, Normal Exam





- Neck Exam


Neck Exam: Full ROM, Normal Inspection.  absent: Lymphadenopathy





- Respiratory Exam


Respiratory Exam: Clear to Ausculation Bilateral, NORMAL BREATHING PATTERN





- Cardiovascular Exam


Cardiovascular Exam: REGULAR RHYTHM, RRR, +S1, +S2.  absent: Murmur





- GI/Abdominal Exam


GI & Abdominal Exam: Soft, Normal Bowel Sounds.  absent: Tenderness





- Extremities Exam


Extremities Exam: Full ROM, Normal Capillary Refill, Normal Inspection.  absent

: Joint Swelling, Pedal Edema





- Back Exam


Back Exam: NORMAL INSPECTION





- Neurological Exam


Neurological Exam: Alert, Awake, CN II-XII Intact, Normal Gait, Oriented x3





- Psychiatric Exam


Psychiatric exam: Normal Affect, Normal Mood





- Skin


Skin Exam: Dry, Intact, Normal Color, Warm





Assessment and Plan


(1) DVT prophylaxis


Status: Chronic





(2) CAD (coronary artery disease)


Status: Chronic





(3) Smoking


Status: Chronic





(4) Low back pain


Status: Chronic





(5) Scrotal edema


Status: Chronic





(6) Prediabetes


Status: Chronic





(7) Neurocognitive disorder


Status: Chronic





(8) Leg edema, right


Status: Acute





(9) LLQ pain


Status: Acute





(10) Diverticulitis


Status: Acute








- Assessment and Plan (Free Text)


Assessment: 


(1) DVT prophylaxis


Assessment & Plan: 


scd and ae hose


ambulation


Status: Chronic





(2) CAD (coronary artery disease)


Assessment & Plan: 


cont meds


Status: Chronic





(3) Smoking


Assessment & Plan: 


nicoderm


Status: Chronic





(4) Low back pain


Assessment & Plan: 


ibuprofen prn


Status: Chronic





(5) Scrotal edema


Status: Chronic





(6) Prediabetes


Assessment & Plan: 


dietary


Status: Chronic





(7) Neurocognitive disorder


Assessment & Plan: 


cont med


pendign placement


Status: Chronic


diverticulitits-cipro/falgyl


po as tolerated

## 2017-02-08 NOTE — CP.PCM.PN
Subjective





- Date & Time of Evaluation


Date of Evaluation: 12/30/16


Time of Evaluation: 07:39





- Subjective


Subjective: 


no f/c, n/v/d


pending palcement


no complaints/distress





Objective





- Vital Signs/Intake and Output


Vital Signs (last 24 hours): 


 











Temp Pulse Resp BP Pulse Ox


 


 98.1 F   78   20   118/64   98 


 


 12/30/16 07:36  12/30/16 07:36  12/30/16 07:36  12/30/16 07:36  12/30/16 07:36











- Medications


Medications: 


 Current Medications





Acetaminophen (Tylenol 325mg Tab)  650 mg PO Q6 PRN


   PRN Reason: Fever >100.4 F


   Last Admin: 12/22/16 19:54 Dose:  650 mg


Aspirin (Ecotrin)  81 mg PO DAILY [facility]


   Last Admin: 12/29/16 08:52 Dose:  81 mg


Atorvastatin Calcium (Lipitor)  20 mg PO HS [facility]


   Last Admin: 12/29/16 22:14 Dose:  20 mg


Divalproex Sodium (Depakote Dr(*Bid*))  500 mg PO BID [facility]


   Last Admin: 12/29/16 16:41 Dose:  500 mg


Enalapril Maleate (Vasotec)  10 mg PO DAILY [facility]


   Last Admin: 12/29/16 08:52 Dose:  10 mg


Famotidine (Pepcid)  20 mg PO BID [facility]


   Last Admin: 12/29/16 16:41 Dose:  20 mg


Ibuprofen (Motrin Tab)  600 mg PO Q8 PRN


   PRN Reason: Pain, moderate (4-7)


   Last Admin: 12/21/16 11:51 Dose:  600 mg


Metoprolol Tartrate (Lopressor)  50 mg PO Q12 [facility]


   Last Admin: 12/29/16 20:46 Dose:  50 mg


Nitrofurantoin Macrocrystals (Macrobid)  100 mg PO Q12 [facility]


   Last Admin: 12/29/16 20:46 Dose:  100 mg


Promethazine HCl/Dextromethorphan (Phenergan Dm Syrup)  5 ml PO Q6 PRN


   PRN Reason: Cough


   Last Admin: 12/22/16 08:54 Dose:  5 ml


Quetiapine Fumarate (Seroquel)  25 mg PO HS [facility]


   Last Admin: 12/29/16 22:14 Dose:  25 mg











- Labs


Labs: 


 





 12/21/16 08:02 





 12/21/16 08:02 





 











PT  11.2 SECONDS (9.4-12.0)   12/14/15  05:20    


 


INR  1.05  (0.89-1.20)   12/14/15  05:20    


 


APTT  36.2 SECONDS (23.1-32.0)  H  12/14/15  05:20    














- Constitutional


Appears: Well, Non-toxic, No Acute Distress





- Head Exam


Head Exam: ATRAUMATIC, NORMAL INSPECTION, NORMOCEPHALIC





- Eye Exam


Eye Exam: EOMI, Normal appearance, PERRL


Pupil Exam: NORMAL ACCOMODATION, PERRL





- ENT Exam


ENT Exam: Mucous Membranes Moist, Normal Exam





- Neck Exam


Neck Exam: Full ROM, Normal Inspection.  absent: Lymphadenopathy





- Respiratory Exam


Respiratory Exam: Clear to Ausculation Bilateral, NORMAL BREATHING PATTERN





- Cardiovascular Exam


Cardiovascular Exam: REGULAR RHYTHM, +S1, +S2.  absent: Murmur





- GI/Abdominal Exam


GI & Abdominal Exam: Soft, Normal Bowel Sounds.  absent: Tenderness





- Extremities Exam


Extremities Exam: Full ROM, Normal Capillary Refill, Normal Inspection.  absent

: Joint Swelling, Pedal Edema





- Back Exam


Back Exam: NORMAL INSPECTION





- Neurological Exam


Neurological Exam: Alert, Awake, CN II-XII Intact, Normal Gait, Oriented x3





- Psychiatric Exam


Psychiatric exam: Normal Affect, Normal Mood





- Skin


Skin Exam: Dry, Intact, Normal Color, Warm





Assessment and Plan


(1) DVT prophylaxis


Status: Chronic





(2) CAD (coronary artery disease)


Status: Chronic





(3) Smoking


Status: Chronic





(4) Low back pain


Status: Chronic





(5) Scrotal edema


Status: Chronic





(6) Prediabetes


Status: Chronic





(7) Neurocognitive disorder


Status: Chronic





(8) Leg edema, right


Status: Acute








- Assessment and Plan (Free Text)


Assessment: 


(1) DVT prophylaxis


Assessment & Plan: 


scd and ae hose


ambulation


Status: Chronic





(2) CAD (coronary artery disease)


Assessment & Plan: 


cont meds


Status: Chronic





(3) Smoking


Assessment & Plan: 


nicoderm


Status: Chronic





(4) Low back pain


Assessment & Plan: 


ibuprofen prn


Status: Chronic





(5) Scrotal edema


Status: Chronic





(6) Prediabetes


Assessment & Plan: 


dietary


Status: Chronic





(7) Neurocognitive disorder


Assessment & Plan: 


cont med


spendign placement


Status: Chronic





uti macrboid x 10 days No

## 2017-02-09 RX ADMIN — METRONIDAZOLE SCH MLS/HR: 500 INJECTION, SOLUTION INTRAVENOUS at 08:56

## 2017-02-09 RX ADMIN — CIPROFLOXACIN SCH MLS/HR: 2 INJECTION, SOLUTION INTRAVENOUS at 21:08

## 2017-02-09 RX ADMIN — METRONIDAZOLE SCH MLS/HR: 500 INJECTION, SOLUTION INTRAVENOUS at 16:45

## 2017-02-09 RX ADMIN — METRONIDAZOLE SCH MLS/HR: 500 INJECTION, SOLUTION INTRAVENOUS at 00:16

## 2017-02-09 RX ADMIN — DIVALPROEX SODIUM SCH MG: 500 TABLET, DELAYED RELEASE ORAL at 08:59

## 2017-02-09 RX ADMIN — DIVALPROEX SODIUM SCH MG: 500 TABLET, DELAYED RELEASE ORAL at 16:50

## 2017-02-09 RX ADMIN — CIPROFLOXACIN SCH MLS/HR: 2 INJECTION, SOLUTION INTRAVENOUS at 10:10

## 2017-02-09 NOTE — CP.PCM.PN
Subjective





- Date & Time of Evaluation


Date of Evaluation: 02/09/17


Time of Evaluation: 11:08





- Subjective


Subjective: 


no complaints/distress


no f/c, n/v/d


pending placement


po tolerant





Objective





- Vital Signs/Intake and Output


Vital Signs (last 24 hours): 


 











Temp Pulse Resp BP Pulse Ox


 


 97.5 F L  75   20   104/68   97 


 


 02/09/17 07:56  02/09/17 07:56  02/09/17 07:56  02/09/17 08:59  02/09/17 07:56











- Medications


Medications: 


 Current Medications





Acetaminophen (Tylenol 325mg Tab)  650 mg PO Q6 PRN


   PRN Reason: Fever >100.4 F


   Last Admin: 12/22/16 19:54 Dose:  650 mg


Aspirin (Ecotrin)  81 mg PO DAILY [facility]


   Last Admin: 02/09/17 08:59 Dose:  81 mg


Atorvastatin Calcium (Lipitor)  20 mg PO HS [facility]


   Last Admin: 02/08/17 21:24 Dose:  20 mg


Divalproex Sodium (Depakote Dr(*Bid*))  500 mg PO BID [facility]


   Last Admin: 02/09/17 08:59 Dose:  500 mg


Docusate Sodium (Colace)  100 mg PO BID [facility]


   Last Admin: 02/09/17 08:59 Dose:  100 mg


Enalapril Maleate (Vasotec)  10 mg PO DAILY [facility]


   Last Admin: 02/08/17 10:27 Dose:  10 mg


Famotidine (Pepcid)  20 mg PO BID [facility]


   Last Admin: 02/09/17 08:58 Dose:  20 mg


Metronidazole 250 mg/ (Miscellaneous)  50 mls @ 50 mls/hr IVPB Q8 [facility]


   Last Admin: 02/09/17 08:56 Dose:  50 mls/hr


Ciprofloxacin (Cipro 400mg/200ml Dsw)  200 mls @ 200 mls/hr IVPB Q12@1000,2200 

[facility]


   Last Admin: 02/09/17 10:10 Dose:  200 mls/hr


Ibuprofen (Motrin Tab)  600 mg PO Q8 PRN


   PRN Reason: Pain, moderate (4-7)


   Last Admin: 01/06/17 16:32 Dose:  600 mg


Ketorolac Tromethamine (Toradol)  30 mg IVP Q6 PRN


   PRN Reason: pain 2-10


Metoprolol Tartrate (Lopressor)  50 mg PO Q12 [facility]


   Last Admin: 02/09/17 08:59 Dose:  Not Given


Quetiapine Fumarate (Seroquel)  25 mg PO HS [facility]


   Last Admin: 02/08/17 21:24 Dose:  25 mg











- Labs


Labs: 


 





 02/02/17 10:12 





 02/02/17 10:12 





 











PT  11.2 SECONDS (9.4-12.0)   12/14/15  05:20    


 


INR  1.05  (0.89-1.20)   12/14/15  05:20    


 


APTT  36.2 SECONDS (23.1-32.0)  H  12/14/15  05:20    














- Constitutional


Appears: Well, Non-toxic, No Acute Distress





- Head Exam


Head Exam: ATRAUMATIC, NORMAL INSPECTION, NORMOCEPHALIC





- Eye Exam


Eye Exam: EOMI, Normal appearance, PERRL


Pupil Exam: NORMAL ACCOMODATION, PERRL





- ENT Exam


ENT Exam: Mucous Membranes Moist, Normal Exam





- Neck Exam


Neck Exam: Full ROM, Normal Inspection.  absent: Lymphadenopathy





- Respiratory Exam


Respiratory Exam: Clear to Ausculation Bilateral, NORMAL BREATHING PATTERN





- Cardiovascular Exam


Cardiovascular Exam: REGULAR RHYTHM, +S1, +S2.  absent: Murmur





- GI/Abdominal Exam


GI & Abdominal Exam: Soft, Normal Bowel Sounds.  absent: Tenderness





- Extremities Exam


Extremities Exam: Full ROM, Normal Capillary Refill, Normal Inspection.  absent

: Joint Swelling, Pedal Edema





- Back Exam


Back Exam: NORMAL INSPECTION





- Neurological Exam


Neurological Exam: Alert, Awake, CN II-XII Intact, Normal Gait, Oriented x3





- Psychiatric Exam


Psychiatric exam: Normal Affect, Normal Mood





- Skin


Skin Exam: Dry, Intact, Normal Color, Warm





Assessment and Plan


(1) DVT prophylaxis


Status: Chronic





(2) CAD (coronary artery disease)


Status: Chronic





(3) Smoking


Status: Chronic





(4) Low back pain


Status: Chronic





(5) Scrotal edema


Status: Chronic





(6) Prediabetes


Status: Chronic





(7) Neurocognitive disorder


Status: Chronic





(8) Leg edema, right


Status: Acute





(9) LLQ pain


Status: Acute





(10) Diverticulitis


Status: Acute








- Assessment and Plan (Free Text)


Assessment: 


(1) DVT prophylaxis


Assessment & Plan: 


scd and ae hose


ambulation


Status: Chronic





(2) CAD (coronary artery disease)


Assessment & Plan: 


cont meds


Status: Chronic





(3) Smoking


Assessment & Plan: 


nicoderm


Status: Chronic





(4) Low back pain


Assessment & Plan: 


ibuprofen prn


Status: Chronic





(5) Scrotal edema


Status: Chronic





(6) Prediabetes


Assessment & Plan: 


dietary


Status: Chronic





(7) Neurocognitive disorder


Assessment & Plan: 


cont med


pendign placement


Status: Chronic


diverticulitits-cipro/falgyl


po as tolerated


10 days total anbx

## 2017-02-10 RX ADMIN — CIPROFLOXACIN SCH MLS/HR: 2 INJECTION, SOLUTION INTRAVENOUS at 09:45

## 2017-02-10 RX ADMIN — METRONIDAZOLE SCH MLS/HR: 500 INJECTION, SOLUTION INTRAVENOUS at 17:06

## 2017-02-10 RX ADMIN — METRONIDAZOLE SCH MLS/HR: 500 INJECTION, SOLUTION INTRAVENOUS at 09:00

## 2017-02-10 RX ADMIN — DIVALPROEX SODIUM SCH MG: 500 TABLET, DELAYED RELEASE ORAL at 17:06

## 2017-02-10 RX ADMIN — CIPROFLOXACIN SCH MLS/HR: 2 INJECTION, SOLUTION INTRAVENOUS at 21:11

## 2017-02-10 RX ADMIN — METRONIDAZOLE SCH MLS/HR: 500 INJECTION, SOLUTION INTRAVENOUS at 01:39

## 2017-02-10 RX ADMIN — DIVALPROEX SODIUM SCH MG: 500 TABLET, DELAYED RELEASE ORAL at 09:06

## 2017-02-10 NOTE — CP.PCM.PN
Subjective





- Date & Time of Evaluation


Date of Evaluation: 02/10/17


Time of Evaluation: 08:09





- Subjective


Subjective: 


no complaints/distress


no f/c, n/v/d


pending placement








Objective





- Vital Signs/Intake and Output


Vital Signs (last 24 hours): 


 











Temp Pulse Resp BP Pulse Ox


 


 97.4 F L  79   18   104/71   99 


 


 02/10/17 07:50  02/10/17 07:50  02/10/17 07:50  02/10/17 07:50  02/10/17 07:50











- Medications


Medications: 


 Current Medications





Acetaminophen (Tylenol 325mg Tab)  650 mg PO Q6 PRN


   PRN Reason: Fever >100.4 F


   Last Admin: 12/22/16 19:54 Dose:  650 mg


Aspirin (Ecotrin)  81 mg PO DAILY [facility]


   Last Admin: 02/09/17 08:59 Dose:  81 mg


Atorvastatin Calcium (Lipitor)  20 mg PO HS [facility]


   Last Admin: 02/09/17 21:09 Dose:  20 mg


Divalproex Sodium (Depakote Dr(*Bid*))  500 mg PO BID [facility]


   Last Admin: 02/09/17 16:50 Dose:  500 mg


Docusate Sodium (Colace)  100 mg PO BID [facility]


   Last Admin: 02/09/17 16:50 Dose:  100 mg


Enalapril Maleate (Vasotec)  10 mg PO DAILY [facility]


   Last Admin: 02/09/17 16:53 Dose:  10 mg


Famotidine (Pepcid)  20 mg PO BID [facility]


   Last Admin: 02/09/17 16:50 Dose:  20 mg


Metronidazole 250 mg/ (Miscellaneous)  50 mls @ 50 mls/hr IVPB Q8 [facility]


   Last Admin: 02/10/17 01:39 Dose:  50 mls/hr


Ciprofloxacin (Cipro 400mg/200ml Dsw)  200 mls @ 200 mls/hr IVPB Q12@1000,2200 

[facility]


   Last Admin: 02/09/17 21:08 Dose:  200 mls/hr


Ibuprofen (Motrin Tab)  600 mg PO Q8 PRN


   PRN Reason: Pain, moderate (4-7)


   Last Admin: 01/06/17 16:32 Dose:  600 mg


Ketorolac Tromethamine (Toradol)  30 mg IVP Q6 PRN


   PRN Reason: pain 2-10


Metoprolol Tartrate (Lopressor)  50 mg PO Q12 [facility]


   Last Admin: 02/09/17 21:09 Dose:  50 mg


Quetiapine Fumarate (Seroquel)  25 mg PO HS [facility]


   Last Admin: 02/09/17 21:09 Dose:  25 mg











- Labs


Labs: 


 





 02/02/17 10:12 





 02/02/17 10:12 





 











PT  11.2 SECONDS (9.4-12.0)   12/14/15  05:20    


 


INR  1.05  (0.89-1.20)   12/14/15  05:20    


 


APTT  36.2 SECONDS (23.1-32.0)  H  12/14/15  05:20    














- Constitutional


Appears: Well, Non-toxic, No Acute Distress





- Head Exam


Head Exam: ATRAUMATIC, NORMAL INSPECTION, NORMOCEPHALIC





- Eye Exam


Eye Exam: EOMI, Normal appearance, PERRL


Pupil Exam: NORMAL ACCOMODATION, PERRL





- ENT Exam


ENT Exam: Mucous Membranes Moist, Normal Exam





- Neck Exam


Neck Exam: Full ROM, Normal Inspection.  absent: Lymphadenopathy





- Respiratory Exam


Respiratory Exam: Clear to Ausculation Bilateral, NORMAL BREATHING PATTERN





- Cardiovascular Exam


Cardiovascular Exam: REGULAR RHYTHM, RRR, +S1, +S2.  absent: Murmur





- GI/Abdominal Exam


GI & Abdominal Exam: Soft, Normal Bowel Sounds.  absent: Tenderness





- Extremities Exam


Extremities Exam: Full ROM, Normal Capillary Refill, Normal Inspection.  absent

: Joint Swelling, Pedal Edema





- Back Exam


Back Exam: NORMAL INSPECTION





- Neurological Exam


Neurological Exam: Alert, Awake, CN II-XII Intact, Normal Gait, Oriented x3





- Psychiatric Exam


Psychiatric exam: Normal Affect, Normal Mood





- Skin


Skin Exam: Dry, Intact, Normal Color, Warm





Assessment and Plan


(1) DVT prophylaxis


Status: Chronic





(2) CAD (coronary artery disease)


Status: Chronic





(3) Smoking


Status: Chronic





(4) Low back pain


Status: Chronic





(5) Scrotal edema


Status: Chronic





(6) Prediabetes


Status: Chronic





(7) Neurocognitive disorder


Status: Chronic





(8) Leg edema, right


Status: Acute





(9) LLQ pain


Status: Acute





(10) Diverticulitis


Status: Acute








- Assessment and Plan (Free Text)


Assessment: 


(1) DVT prophylaxis


Assessment & Plan: 


scd and ae hose


ambulation


Status: Chronic





(2) CAD (coronary artery disease)


Assessment & Plan: 


cont meds


Status: Chronic





(3) Smoking


Assessment & Plan: 


nicoderm


Status: Chronic





(4) Low back pain


Assessment & Plan: 


ibuprofen prn


Status: Chronic





(5) Scrotal edema


Status: Chronic





(6) Prediabetes


Assessment & Plan: 


dietary


Status: Chronic





(7) Neurocognitive disorder


Assessment & Plan: 


cont med


pendign placement


Status: Chronic


diverticulitits-cipro/falgyl


po as tolerated


10 days total anbx

## 2017-02-11 RX ADMIN — CIPROFLOXACIN SCH MLS/HR: 2 INJECTION, SOLUTION INTRAVENOUS at 21:22

## 2017-02-11 RX ADMIN — METRONIDAZOLE SCH MLS/HR: 500 INJECTION, SOLUTION INTRAVENOUS at 00:07

## 2017-02-11 RX ADMIN — DIVALPROEX SODIUM SCH MG: 500 TABLET, DELAYED RELEASE ORAL at 16:08

## 2017-02-11 RX ADMIN — METRONIDAZOLE SCH MLS/HR: 500 INJECTION, SOLUTION INTRAVENOUS at 16:08

## 2017-02-11 RX ADMIN — DIVALPROEX SODIUM SCH MG: 500 TABLET, DELAYED RELEASE ORAL at 08:15

## 2017-02-11 RX ADMIN — CIPROFLOXACIN SCH MLS/HR: 2 INJECTION, SOLUTION INTRAVENOUS at 09:04

## 2017-02-11 RX ADMIN — METRONIDAZOLE SCH MLS/HR: 500 INJECTION, SOLUTION INTRAVENOUS at 08:15

## 2017-02-11 NOTE — CP.PCM.PN
Subjective





- Date & Time of Evaluation


Date of Evaluation: 02/11/17


Time of Evaluation: 07:09





- Subjective


Subjective: 


no f/c, n/v/d.


pending placement


no complaints/distress





Objective





- Vital Signs/Intake and Output


Vital Signs (last 24 hours): 


 











Temp Pulse Resp BP Pulse Ox


 


 98.7 F   76   20   118/79   98 


 


 02/10/17 21:11  02/10/17 21:11  02/10/17 21:11  02/10/17 21:11  02/10/17 21:11











- Medications


Medications: 


 Current Medications





Acetaminophen (Tylenol 325mg Tab)  650 mg PO Q6 PRN


   PRN Reason: Fever >100.4 F


   Last Admin: 12/22/16 19:54 Dose:  650 mg


Aspirin (Ecotrin)  81 mg PO DAILY [facility]


   Last Admin: 02/10/17 09:07 Dose:  81 mg


Atorvastatin Calcium (Lipitor)  20 mg PO HS [facility]


   Last Admin: 02/10/17 21:12 Dose:  20 mg


Divalproex Sodium (Depakote Dr(*Bid*))  500 mg PO BID [facility]


   Last Admin: 02/10/17 17:06 Dose:  500 mg


Docusate Sodium (Colace)  100 mg PO BID [facility]


   Last Admin: 02/10/17 17:05 Dose:  100 mg


Enalapril Maleate (Vasotec)  10 mg PO DAILY [facility]


   Last Admin: 02/10/17 09:03 Dose:  10 mg


Famotidine (Pepcid)  20 mg PO BID [facility]


   Last Admin: 02/10/17 17:13 Dose:  20 mg


Metronidazole 250 mg/ (Miscellaneous)  50 mls @ 50 mls/hr IVPB Q8 [facility]


   Last Admin: 02/11/17 00:07 Dose:  50 mls/hr


Ciprofloxacin (Cipro 400mg/200ml Dsw)  200 mls @ 200 mls/hr IVPB Q12@1000,2200 

[facility]


   Last Admin: 02/10/17 21:11 Dose:  200 mls/hr


Ibuprofen (Motrin Tab)  600 mg PO Q8 PRN


   PRN Reason: Pain, moderate (4-7)


   Last Admin: 01/06/17 16:32 Dose:  600 mg


Ketorolac Tromethamine (Toradol)  30 mg IVP Q6 PRN


   PRN Reason: pain 2-10


Metoprolol Tartrate (Lopressor)  50 mg PO Q12 [facility]


   Last Admin: 02/10/17 20:33 Dose:  50 mg


Quetiapine Fumarate (Seroquel)  25 mg PO HS [facility]


   Last Admin: 02/10/17 21:12 Dose:  25 mg











- Labs


Labs: 


 





 02/02/17 10:12 





 02/02/17 10:12 





 











PT  11.2 SECONDS (9.4-12.0)   12/14/15  05:20    


 


INR  1.05  (0.89-1.20)   12/14/15  05:20    


 


APTT  36.2 SECONDS (23.1-32.0)  H  12/14/15  05:20    














- Constitutional


Appears: Well, Non-toxic, No Acute Distress





- Head Exam


Head Exam: ATRAUMATIC, NORMAL INSPECTION, NORMOCEPHALIC





- Eye Exam


Eye Exam: EOMI, Normal appearance, PERRL


Pupil Exam: NORMAL ACCOMODATION, PERRL





- ENT Exam


ENT Exam: Mucous Membranes Moist, Normal Exam





- Neck Exam


Neck Exam: Full ROM, Normal Inspection.  absent: Lymphadenopathy





- Respiratory Exam


Respiratory Exam: Clear to Ausculation Bilateral, NORMAL BREATHING PATTERN





- Cardiovascular Exam


Cardiovascular Exam: REGULAR RHYTHM, RRR, +S1, +S2.  absent: Murmur





- GI/Abdominal Exam


GI & Abdominal Exam: Soft, Normal Bowel Sounds.  absent: Tenderness





- Extremities Exam


Extremities Exam: Full ROM, Normal Capillary Refill, Normal Inspection.  absent

: Joint Swelling, Pedal Edema





- Back Exam


Back Exam: NORMAL INSPECTION





- Neurological Exam


Neurological Exam: Alert, Awake, CN II-XII Intact, Normal Gait, Oriented x3





- Psychiatric Exam


Psychiatric exam: Normal Affect, Normal Mood





- Skin


Skin Exam: Dry, Intact, Normal Color, Warm





Assessment and Plan


(1) DVT prophylaxis


Status: Chronic





(2) CAD (coronary artery disease)


Status: Chronic





(3) Smoking


Status: Chronic





(4) Low back pain


Status: Chronic





(5) Scrotal edema


Status: Chronic





(6) Prediabetes


Status: Chronic





(7) Neurocognitive disorder


Status: Chronic





(8) Leg edema, right


Status: Acute





(9) LLQ pain


Status: Acute





(10) Diverticulitis


Status: Acute








- Assessment and Plan (Free Text)


Assessment: 


(1) DVT prophylaxis


Assessment & Plan: 


scd and ae hose


ambulation


Status: Chronic





(2) CAD (coronary artery disease)


Assessment & Plan: 


cont meds


Status: Chronic





(3) Smoking


Assessment & Plan: 


nicoderm


Status: Chronic





(4) Low back pain


Assessment & Plan: 


ibuprofen prn


Status: Chronic





(5) Scrotal edema


Status: Chronic





(6) Prediabetes


Assessment & Plan: 


dietary


Status: Chronic





(7) Neurocognitive disorder


Assessment & Plan: 


cont med


pendign placement


Status: Chronic


diverticulitits-cipro/falgyl


po as tolerated


10 days total anbx

## 2017-02-12 RX ADMIN — METRONIDAZOLE SCH MLS/HR: 500 INJECTION, SOLUTION INTRAVENOUS at 09:04

## 2017-02-12 RX ADMIN — DIVALPROEX SODIUM SCH MG: 500 TABLET, DELAYED RELEASE ORAL at 09:05

## 2017-02-12 RX ADMIN — CIPROFLOXACIN SCH MLS/HR: 2 INJECTION, SOLUTION INTRAVENOUS at 09:04

## 2017-02-12 RX ADMIN — METRONIDAZOLE SCH MLS/HR: 500 INJECTION, SOLUTION INTRAVENOUS at 16:08

## 2017-02-12 RX ADMIN — CIPROFLOXACIN SCH MLS/HR: 2 INJECTION, SOLUTION INTRAVENOUS at 21:18

## 2017-02-12 RX ADMIN — DIVALPROEX SODIUM SCH MG: 500 TABLET, DELAYED RELEASE ORAL at 16:09

## 2017-02-12 RX ADMIN — METRONIDAZOLE SCH MLS/HR: 500 INJECTION, SOLUTION INTRAVENOUS at 00:53

## 2017-02-12 NOTE — CP.PCM.PN
Subjective





- Date & Time of Evaluation


Date of Evaluation: 02/12/17


Time of Evaluation: 09:30





- Subjective


Subjective: 


no complaints/distress


no f/c, n/v/d


pending placement





Objective





- Vital Signs/Intake and Output


Vital Signs (last 24 hours): 


 











Temp Pulse Resp BP Pulse Ox


 


 98.5 F   90   18   104/72   97 


 


 02/12/17 16:44  02/12/17 16:44  02/12/17 16:44  02/12/17 16:44  02/12/17 16:44











- Medications


Medications: 


 Current Medications





Acetaminophen (Tylenol 325mg Tab)  650 mg PO Q6 PRN


   PRN Reason: Fever >100.4 F


   Last Admin: 12/22/16 19:54 Dose:  650 mg


Aspirin (Ecotrin)  81 mg PO DAILY [facility]


   Last Admin: 02/12/17 09:05 Dose:  81 mg


Atorvastatin Calcium (Lipitor)  20 mg PO HS [facility]


   Last Admin: 02/11/17 21:22 Dose:  20 mg


Divalproex Sodium (Depakote Dr(*Bid*))  500 mg PO BID [facility]


   Last Admin: 02/12/17 16:09 Dose:  500 mg


Docusate Sodium (Colace)  100 mg PO BID [facility]


   Last Admin: 02/12/17 16:09 Dose:  100 mg


Enalapril Maleate (Vasotec)  10 mg PO DAILY [facility]


   Last Admin: 02/12/17 09:05 Dose:  10 mg


Famotidine (Pepcid)  20 mg PO BID [facility]


   Last Admin: 02/12/17 16:09 Dose:  20 mg


Metronidazole 250 mg/ (Miscellaneous)  50 mls @ 50 mls/hr IVPB Q8 [facility]


   Last Admin: 02/12/17 16:08 Dose:  50 mls/hr


Ciprofloxacin (Cipro 400mg/200ml Dsw)  200 mls @ 200 mls/hr IVPB Q12@1000,2200 

[facility]


   Last Admin: 02/12/17 09:04 Dose:  200 mls/hr


Ibuprofen (Motrin Tab)  600 mg PO Q8 PRN


   PRN Reason: Pain, moderate (4-7)


   Last Admin: 01/06/17 16:32 Dose:  600 mg


Ketorolac Tromethamine (Toradol)  30 mg IVP Q6 PRN


   PRN Reason: pain 2-10


Metoprolol Tartrate (Lopressor)  50 mg PO Q12 [facility]


   Last Admin: 02/12/17 09:05 Dose:  50 mg


Quetiapine Fumarate (Seroquel)  25 mg PO HS [facility]


   Last Admin: 02/11/17 21:22 Dose:  25 mg











- Labs


Labs: 


 





 02/02/17 10:12 





 02/02/17 10:12 





 











PT  11.2 SECONDS (9.4-12.0)   12/14/15  05:20    


 


INR  1.05  (0.89-1.20)   12/14/15  05:20    


 


APTT  36.2 SECONDS (23.1-32.0)  H  12/14/15  05:20    














Assessment and Plan


(1) DVT prophylaxis


Status: Chronic





(2) CAD (coronary artery disease)


Status: Chronic





(3) Smoking


Status: Chronic





(4) Low back pain


Status: Chronic





(5) Scrotal edema


Status: Chronic





(6) Prediabetes


Status: Chronic





(7) Neurocognitive disorder


Status: Chronic





(8) Leg edema, right


Status: Acute





(9) LLQ pain


Status: Acute





(10) Diverticulitis


Status: Acute








- Assessment and Plan (Free Text)


Assessment: 


cont same plan

## 2017-02-13 RX ADMIN — DIVALPROEX SODIUM SCH MG: 500 TABLET, DELAYED RELEASE ORAL at 17:03

## 2017-02-13 RX ADMIN — METRONIDAZOLE SCH MLS/HR: 500 INJECTION, SOLUTION INTRAVENOUS at 00:01

## 2017-02-13 RX ADMIN — DIVALPROEX SODIUM SCH MG: 500 TABLET, DELAYED RELEASE ORAL at 08:33

## 2017-02-13 NOTE — CP.PCM.PN
Subjective





- Date & Time of Evaluation


Date of Evaluation: 02/13/17


Time of Evaluation: 08:05





- Subjective


Subjective: 


no complaitns/distress


no f/c, n/v/d


penidng placement


last day of anbx for diverticulitis, po tolerant. no pain





Objective





- Vital Signs/Intake and Output


Vital Signs (last 24 hours): 


 











Temp Pulse Resp BP Pulse Ox


 


 97.2 F L  71   20   109/76   98 


 


 02/13/17 07:41  02/13/17 07:41  02/13/17 07:41  02/13/17 07:41  02/13/17 07:41











- Medications


Medications: 


 Current Medications





Acetaminophen (Tylenol 325mg Tab)  650 mg PO Q6 PRN


   PRN Reason: Fever >100.4 F


   Last Admin: 12/22/16 19:54 Dose:  650 mg


Aspirin (Ecotrin)  81 mg PO DAILY [facility]


   Last Admin: 02/12/17 09:05 Dose:  81 mg


Atorvastatin Calcium (Lipitor)  20 mg PO HS [facility]


   Last Admin: 02/12/17 21:17 Dose:  20 mg


Divalproex Sodium (Depakote Dr(*Bid*))  500 mg PO BID [facility]


   Last Admin: 02/12/17 16:09 Dose:  500 mg


Docusate Sodium (Colace)  100 mg PO BID [facility]


   Last Admin: 02/12/17 16:09 Dose:  100 mg


Enalapril Maleate (Vasotec)  10 mg PO DAILY [facility]


   Last Admin: 02/12/17 09:05 Dose:  10 mg


Famotidine (Pepcid)  20 mg PO BID [facility]


   Last Admin: 02/12/17 16:09 Dose:  20 mg


Ibuprofen (Motrin Tab)  600 mg PO Q8 PRN


   PRN Reason: Pain, moderate (4-7)


   Last Admin: 01/06/17 16:32 Dose:  600 mg


Ketorolac Tromethamine (Toradol)  30 mg IVP Q6 PRN


   PRN Reason: pain 2-10


Metoprolol Tartrate (Lopressor)  50 mg PO Q12 [facility]


   Last Admin: 02/12/17 21:17 Dose:  50 mg


Quetiapine Fumarate (Seroquel)  25 mg PO HS [facility]


   Last Admin: 02/12/17 21:18 Dose:  25 mg











- Labs


Labs: 


 





 02/02/17 10:12 





 02/02/17 10:12 





 











PT  11.2 SECONDS (9.4-12.0)   12/14/15  05:20    


 


INR  1.05  (0.89-1.20)   12/14/15  05:20    


 


APTT  36.2 SECONDS (23.1-32.0)  H  12/14/15  05:20    














- Constitutional


Appears: Well, Non-toxic, No Acute Distress





- Head Exam


Head Exam: ATRAUMATIC, NORMAL INSPECTION, NORMOCEPHALIC





- Eye Exam


Eye Exam: EOMI, Normal appearance, PERRL


Pupil Exam: NORMAL ACCOMODATION, PERRL





- ENT Exam


ENT Exam: Mucous Membranes Moist, Normal Exam





- Neck Exam


Neck Exam: Full ROM, Normal Inspection.  absent: Lymphadenopathy





- Respiratory Exam


Respiratory Exam: Clear to Ausculation Bilateral, NORMAL BREATHING PATTERN





- Cardiovascular Exam


Cardiovascular Exam: REGULAR RHYTHM, RRR, +S1, +S2.  absent: Murmur





- GI/Abdominal Exam


GI & Abdominal Exam: Soft, Normal Bowel Sounds.  absent: Tenderness





- Extremities Exam


Extremities Exam: Full ROM, Normal Capillary Refill, Normal Inspection.  absent

: Joint Swelling, Pedal Edema





- Back Exam


Back Exam: NORMAL INSPECTION





- Neurological Exam


Neurological Exam: Alert, Awake, CN II-XII Intact, Normal Gait, Oriented x3





- Psychiatric Exam


Psychiatric exam: Normal Affect, Normal Mood





- Skin


Skin Exam: Dry, Intact, Normal Color, Warm





Assessment and Plan


(1) DVT prophylaxis


Status: Chronic





(2) CAD (coronary artery disease)


Status: Chronic





(3) Smoking


Status: Chronic





(4) Low back pain


Status: Chronic





(5) Scrotal edema


Status: Chronic





(6) Prediabetes


Status: Chronic





(7) Neurocognitive disorder


Status: Chronic





(8) Leg edema, right


Status: Acute





(9) LLQ pain


Status: Acute





(10) Diverticulitis


Status: Acute








- Assessment and Plan (Free Text)


Assessment: 


(1) DVT prophylaxis


Assessment & Plan: 


scd and ae hose


ambulation


Status: Chronic





(2) CAD (coronary artery disease)


Assessment & Plan: 


cont meds


Status: Chronic





(3) Smoking


Assessment & Plan: 


nicoderm


Status: Chronic





(4) Low back pain


Assessment & Plan: 


ibuprofen prn


Status: Chronic





(5) Scrotal edema


Status: Chronic





(6) Prediabetes


Assessment & Plan: 


dietary


Status: Chronic





(7) Neurocognitive disorder


Assessment & Plan: 


cont med


pendign placement


Status: Chronic


diverticulitits-cipro/falgyl


po as tolerated


10 days total anbx - last day of anbx today

## 2017-02-14 RX ADMIN — DIVALPROEX SODIUM SCH MG: 500 TABLET, DELAYED RELEASE ORAL at 09:00

## 2017-02-14 RX ADMIN — DIVALPROEX SODIUM SCH MG: 500 TABLET, DELAYED RELEASE ORAL at 17:20

## 2017-02-14 NOTE — CP.PCM.PN
Subjective





- Date & Time of Evaluation


Date of Evaluation: 02/14/17


Time of Evaluation: 08:25





- Subjective


Subjective: 


no complaint/distress


nof /c, n/v/d


penidng placement





Objective





- Vital Signs/Intake and Output


Vital Signs (last 24 hours): 


 











Temp Pulse Resp BP Pulse Ox


 


 97.4 F L  70   20   96/65 L  96 


 


 02/14/17 08:24  02/14/17 08:24  02/14/17 08:24  02/14/17 08:24  02/14/17 08:24











- Medications


Medications: 


 Current Medications





Acetaminophen (Tylenol 325mg Tab)  650 mg PO Q6 PRN


   PRN Reason: Fever >100.4 F


   Last Admin: 12/22/16 19:54 Dose:  650 mg


Aspirin (Ecotrin)  81 mg PO DAILY [facility]


   Last Admin: 02/13/17 08:33 Dose:  81 mg


Atorvastatin Calcium (Lipitor)  20 mg PO HS [facility]


   Last Admin: 02/13/17 21:45 Dose:  20 mg


Divalproex Sodium (Depakote Dr(*Bid*))  500 mg PO BID [facility]


   Last Admin: 02/13/17 17:03 Dose:  500 mg


Docusate Sodium (Colace)  100 mg PO BID [facility]


   Last Admin: 02/13/17 17:05 Dose:  100 mg


Enalapril Maleate (Vasotec)  10 mg PO DAILY [facility]


   Last Admin: 02/13/17 08:35 Dose:  10 mg


Famotidine (Pepcid)  20 mg PO BID [facility]


   Last Admin: 02/13/17 17:04 Dose:  20 mg


Ibuprofen (Motrin Tab)  600 mg PO Q8 PRN


   PRN Reason: Pain, moderate (4-7)


   Last Admin: 01/06/17 16:32 Dose:  600 mg


Metoprolol Tartrate (Lopressor)  50 mg PO Q12 [facility]


   Last Admin: 02/13/17 21:45 Dose:  50 mg


Quetiapine Fumarate (Seroquel)  25 mg PO HS [facility]


   Last Admin: 02/13/17 21:45 Dose:  25 mg











- Labs


Labs: 


 





 02/02/17 10:12 





 02/02/17 10:12 





 











PT  11.2 SECONDS (9.4-12.0)   12/14/15  05:20    


 


INR  1.05  (0.89-1.20)   12/14/15  05:20    


 


APTT  36.2 SECONDS (23.1-32.0)  H  12/14/15  05:20    














- Constitutional


Appears: Well, Non-toxic, No Acute Distress





- Head Exam


Head Exam: ATRAUMATIC, NORMAL INSPECTION, NORMOCEPHALIC





- Eye Exam


Eye Exam: EOMI, Normal appearance, PERRL


Pupil Exam: NORMAL ACCOMODATION, PERRL





- ENT Exam


ENT Exam: Mucous Membranes Moist, Normal Exam





- Neck Exam


Neck Exam: Full ROM, Normal Inspection.  absent: Lymphadenopathy





- Respiratory Exam


Respiratory Exam: Clear to Ausculation Bilateral, NORMAL BREATHING PATTERN





- Cardiovascular Exam


Cardiovascular Exam: REGULAR RHYTHM, RRR, +S1, +S2.  absent: Murmur





- GI/Abdominal Exam


GI & Abdominal Exam: Soft, Normal Bowel Sounds.  absent: Tenderness





- Extremities Exam


Extremities Exam: Full ROM, Normal Capillary Refill, Normal Inspection.  absent

: Joint Swelling, Pedal Edema





- Back Exam


Back Exam: NORMAL INSPECTION





- Neurological Exam


Neurological Exam: Alert, Awake, CN II-XII Intact, Normal Gait, Oriented x3





- Psychiatric Exam


Psychiatric exam: Normal Affect, Normal Mood





- Skin


Skin Exam: Dry, Intact, Normal Color, Warm





Assessment and Plan


(1) DVT prophylaxis


Status: Chronic





(2) CAD (coronary artery disease)


Status: Chronic





(3) Smoking


Status: Chronic





(4) Low back pain


Status: Chronic





(5) Scrotal edema


Status: Chronic





(6) Prediabetes


Status: Chronic





(7) Neurocognitive disorder


Status: Chronic








- Assessment and Plan (Free Text)


Assessment: 


(1) DVT prophylaxis


Assessment & Plan: 


scd and ae hose


ambulation


Status: Chronic





(2) CAD (coronary artery disease)


Assessment & Plan: 


cont meds


Status: Chronic





(3) Smoking


Assessment & Plan: 


nicoderm


Status: Chronic





(4) Low back pain


Assessment & Plan: 


ibuprofen prn


Status: Chronic





(5) Scrotal edema


Status: Chronic





(6) Prediabetes


Assessment & Plan: 


dietary


Status: Chronic





(7) Neurocognitive disorder


Assessment & Plan: 


cont med


pendign placement


Status: Chronic

## 2017-02-15 RX ADMIN — DIVALPROEX SODIUM SCH MG: 500 TABLET, DELAYED RELEASE ORAL at 08:21

## 2017-02-15 RX ADMIN — DIVALPROEX SODIUM SCH MG: 500 TABLET, DELAYED RELEASE ORAL at 16:38

## 2017-02-15 NOTE — CP.PCM.PN
Subjective





- Date & Time of Evaluation


Date of Evaluation: 02/15/17


Time of Evaluation: 08:52





- Subjective


Subjective: 


no complaitns/distress


no f/c, n/v/d


penidng placement





Objective





- Vital Signs/Intake and Output


Vital Signs (last 24 hours): 


 











Temp Pulse Resp BP Pulse Ox


 


 97.9 F   71   20   106/72   97 


 


 02/15/17 07:53  02/15/17 07:53  02/15/17 07:53  02/15/17 08:23  02/15/17 07:53











- Medications


Medications: 


 Current Medications





Aspirin (Ecotrin)  81 mg PO DAILY [facility]


   Last Admin: 02/15/17 08:21 Dose:  81 mg


Atorvastatin Calcium (Lipitor)  20 mg PO HS [facility]


   Last Admin: 02/14/17 22:22 Dose:  20 mg


Divalproex Sodium (Depakote Dr(*Bid*))  500 mg PO BID [facility]


   Last Admin: 02/15/17 08:21 Dose:  500 mg


Docusate Sodium (Colace)  100 mg PO BID [facility]


   Last Admin: 02/15/17 08:21 Dose:  100 mg


Enalapril Maleate (Vasotec)  10 mg PO DAILY [facility]


   Last Admin: 02/14/17 09:03 Dose:  Not Given


Famotidine (Pepcid)  20 mg PO BID [facility]


   Last Admin: 02/15/17 08:21 Dose:  20 mg


Ibuprofen (Motrin Tab)  600 mg PO Q8 PRN


   PRN Reason: Pain, moderate (4-7)


   Last Admin: 01/06/17 16:32 Dose:  600 mg


Metoprolol Tartrate (Lopressor)  50 mg PO Q12 [facility]


   Last Admin: 02/15/17 08:23 Dose:  Not Given


Quetiapine Fumarate (Seroquel)  25 mg PO HS [facility]


   Last Admin: 02/14/17 22:22 Dose:  25 mg











- Labs


Labs: 


 





 02/02/17 10:12 





 02/02/17 10:12 





 











PT  11.2 SECONDS (9.4-12.0)   12/14/15  05:20    


 


INR  1.05  (0.89-1.20)   12/14/15  05:20    


 


APTT  36.2 SECONDS (23.1-32.0)  H  12/14/15  05:20    














- Constitutional


Appears: Well, Non-toxic, No Acute Distress





- Head Exam


Head Exam: ATRAUMATIC, NORMAL INSPECTION, NORMOCEPHALIC





- Eye Exam


Eye Exam: EOMI, Normal appearance, PERRL


Pupil Exam: NORMAL ACCOMODATION, PERRL





- ENT Exam


ENT Exam: Mucous Membranes Moist, Normal Exam





- Neck Exam


Neck Exam: Full ROM, Normal Inspection.  absent: Lymphadenopathy





- Respiratory Exam


Respiratory Exam: Clear to Ausculation Bilateral, NORMAL BREATHING PATTERN





- Cardiovascular Exam


Cardiovascular Exam: REGULAR RHYTHM, RRR, +S1, +S2.  absent: Murmur





- GI/Abdominal Exam


GI & Abdominal Exam: Soft, Normal Bowel Sounds.  absent: Tenderness





- Rectal Exam


Rectal Exam: NORMAL INSPECTION





- Extremities Exam


Extremities Exam: Full ROM, Normal Capillary Refill, Normal Inspection.  absent

: Joint Swelling, Pedal Edema





- Back Exam


Back Exam: NORMAL INSPECTION





- Neurological Exam


Neurological Exam: Alert, Awake, CN II-XII Intact, Normal Gait, Oriented x3





- Psychiatric Exam


Psychiatric exam: Normal Affect, Normal Mood





- Skin


Skin Exam: Dry, Intact, Normal Color, Warm





Assessment and Plan


(1) DVT prophylaxis


Status: Chronic





(2) CAD (coronary artery disease)


Status: Chronic





(3) Smoking


Status: Chronic





(4) Low back pain


Status: Chronic





(5) Scrotal edema


Status: Chronic





(6) Prediabetes


Status: Chronic





(7) Neurocognitive disorder


Status: Chronic








- Assessment and Plan (Free Text)


Assessment: 


(1) DVT prophylaxis


Assessment & Plan: 


scd and ae hose


ambulation


Status: Chronic





(2) CAD (coronary artery disease)


Assessment & Plan: 


cont meds


Status: Chronic





(3) Smoking


Assessment & Plan: 


nicoderm


Status: Chronic





(4) Low back pain


Assessment & Plan: 


ibuprofen prn


Status: Chronic





(5) Scrotal edema


Status: Chronic





(6) Prediabetes


Assessment & Plan: 


dietary


Status: Chronic





(7) Neurocognitive disorder


Assessment & Plan: 


cont med


pendign placement


Status: Chronic

## 2017-02-16 RX ADMIN — DIVALPROEX SODIUM SCH MG: 500 TABLET, DELAYED RELEASE ORAL at 08:26

## 2017-02-16 RX ADMIN — DIVALPROEX SODIUM SCH MG: 500 TABLET, DELAYED RELEASE ORAL at 17:18

## 2017-02-16 NOTE — CP.PCM.PN
Subjective





- Date & Time of Evaluation


Date of Evaluation: 02/16/17


Time of Evaluation: 07:22





- Subjective


Subjective: 


no f/c, n/v/d


penidng palcement


no complaints/distress





Objective





- Vital Signs/Intake and Output


Vital Signs (last 24 hours): 


 











Temp Pulse Resp BP Pulse Ox


 


 98.7 F   70   20   124/72   98 


 


 02/15/17 21:13  02/15/17 22:42  02/15/17 21:13  02/15/17 22:42  02/15/17 21:13











- Medications


Medications: 


 Current Medications





Aspirin (Ecotrin)  81 mg PO DAILY [facility]


   Last Admin: 02/15/17 08:21 Dose:  81 mg


Atorvastatin Calcium (Lipitor)  20 mg PO HS [facility]


   Last Admin: 02/15/17 22:42 Dose:  20 mg


Divalproex Sodium (Depakote Dr(*Bid*))  500 mg PO BID [facility]


   Last Admin: 02/15/17 16:38 Dose:  500 mg


Docusate Sodium (Colace)  100 mg PO BID [facility]


   Last Admin: 02/15/17 16:38 Dose:  100 mg


Enalapril Maleate (Vasotec)  10 mg PO DAILY [facility]


   Last Admin: 02/15/17 13:02 Dose:  10 mg


Famotidine (Pepcid)  20 mg PO BID [facility]


   Last Admin: 02/15/17 16:39 Dose:  20 mg


Ibuprofen (Motrin Tab)  600 mg PO Q8 PRN


   PRN Reason: Pain, moderate (4-7)


   Last Admin: 01/06/17 16:32 Dose:  600 mg


Metoprolol Tartrate (Lopressor)  50 mg PO Q12 [facility]


   Last Admin: 02/15/17 22:42 Dose:  50 mg


Quetiapine Fumarate (Seroquel)  25 mg PO HS [facility]


   Last Admin: 02/15/17 22:42 Dose:  25 mg











- Labs


Labs: 


 





 02/02/17 10:12 





 02/02/17 10:12 





 











PT  11.2 SECONDS (9.4-12.0)   12/14/15  05:20    


 


INR  1.05  (0.89-1.20)   12/14/15  05:20    


 


APTT  36.2 SECONDS (23.1-32.0)  H  12/14/15  05:20    














- Constitutional


Appears: Well, Non-toxic, No Acute Distress





- Head Exam


Head Exam: ATRAUMATIC, NORMAL INSPECTION, NORMOCEPHALIC





- Eye Exam


Eye Exam: EOMI, Normal appearance, PERRL


Pupil Exam: NORMAL ACCOMODATION, PERRL





- ENT Exam


ENT Exam: Mucous Membranes Moist, Normal Exam





- Neck Exam


Neck Exam: Full ROM, Normal Inspection.  absent: Lymphadenopathy





- Respiratory Exam


Respiratory Exam: Clear to Ausculation Bilateral, NORMAL BREATHING PATTERN





- Cardiovascular Exam


Cardiovascular Exam: REGULAR RHYTHM, RRR, +S1, +S2.  absent: Murmur





- GI/Abdominal Exam


GI & Abdominal Exam: Soft, Normal Bowel Sounds.  absent: Tenderness





- Extremities Exam


Extremities Exam: Full ROM, Normal Capillary Refill, Normal Inspection.  absent

: Joint Swelling, Pedal Edema





- Back Exam


Back Exam: NORMAL INSPECTION





- Neurological Exam


Neurological Exam: Alert, Awake, CN II-XII Intact, Normal Gait, Oriented x3





- Psychiatric Exam


Psychiatric exam: Normal Affect, Normal Mood





- Skin


Skin Exam: Dry, Intact, Normal Color, Warm





Assessment and Plan


(1) DVT prophylaxis


Status: Chronic





(2) CAD (coronary artery disease)


Status: Chronic





(3) Smoking


Status: Chronic





(4) Low back pain


Status: Chronic





(5) Scrotal edema


Status: Chronic





(6) Prediabetes


Status: Chronic





(7) Neurocognitive disorder


Status: Chronic








- Assessment and Plan (Free Text)


Assessment: 


(1) DVT prophylaxis


Assessment & Plan: 


scd and ae hose


ambulation


Status: Chronic





(2) CAD (coronary artery disease)


Assessment & Plan: 


cont meds


Status: Chronic





(3) Smoking


Assessment & Plan: 


nicoderm


Status: Chronic





(4) Low back pain


Assessment & Plan: 


ibuprofen prn


Status: Chronic





(5) Scrotal edema


Status: Chronic





(6) Prediabetes


Assessment & Plan: 


dietary


Status: Chronic





(7) Neurocognitive disorder


Assessment & Plan: 


cont med


pendign placement


Status: Chronic

## 2017-02-17 RX ADMIN — DIVALPROEX SODIUM SCH MG: 500 TABLET, DELAYED RELEASE ORAL at 10:09

## 2017-02-17 RX ADMIN — DIVALPROEX SODIUM SCH MG: 500 TABLET, DELAYED RELEASE ORAL at 16:55

## 2017-02-17 NOTE — CP.PCM.PN
Subjective





- Date & Time of Evaluation


Date of Evaluation: 02/17/17


Time of Evaluation: 08:04





- Subjective


Subjective: 


no complaints/distress


no f/cn/v/d


pending placement





Objective





- Vital Signs/Intake and Output


Vital Signs (last 24 hours): 


 











Temp Pulse Resp BP Pulse Ox


 


 99.1 F   92 H  20   141/85   98 


 


 02/16/17 20:08  02/16/17 21:32  02/16/17 20:08  02/16/17 21:32  02/16/17 20:08











- Medications


Medications: 


 Current Medications





Aspirin (Ecotrin)  81 mg PO DAILY [facility]


   Last Admin: 02/16/17 08:25 Dose:  81 mg


Atorvastatin Calcium (Lipitor)  20 mg PO HS [facility]


   Last Admin: 02/16/17 21:32 Dose:  20 mg


Divalproex Sodium (Depakote Dr(*Bid*))  500 mg PO BID [facility]


   Last Admin: 02/16/17 17:18 Dose:  500 mg


Docusate Sodium (Colace)  100 mg PO BID [facility]


   Last Admin: 02/16/17 17:18 Dose:  100 mg


Enalapril Maleate (Vasotec)  10 mg PO DAILY [facility]


   Last Admin: 02/16/17 08:25 Dose:  10 mg


Famotidine (Pepcid)  20 mg PO BID [facility]


   Last Admin: 02/16/17 17:19 Dose:  20 mg


Ibuprofen (Motrin Tab)  600 mg PO Q8 PRN


   PRN Reason: Pain, moderate (4-7)


   Last Admin: 01/06/17 16:32 Dose:  600 mg


Metoprolol Tartrate (Lopressor)  50 mg PO Q12 [facility]


   Last Admin: 02/16/17 21:32 Dose:  50 mg


Quetiapine Fumarate (Seroquel)  25 mg PO HS [facility]


   Last Admin: 02/16/17 21:32 Dose:  25 mg











- Labs


Labs: 


 





 02/02/17 10:12 





 02/02/17 10:12 





 











PT  11.2 SECONDS (9.4-12.0)   12/14/15  05:20    


 


INR  1.05  (0.89-1.20)   12/14/15  05:20    


 


APTT  36.2 SECONDS (23.1-32.0)  H  12/14/15  05:20    














- Constitutional


Appears: Well, Non-toxic, No Acute Distress





- Head Exam


Head Exam: ATRAUMATIC, NORMAL INSPECTION, NORMOCEPHALIC





- Eye Exam


Eye Exam: EOMI, Normal appearance, PERRL


Pupil Exam: NORMAL ACCOMODATION, PERRL





- ENT Exam


ENT Exam: Mucous Membranes Moist, Normal Exam





- Neck Exam


Neck Exam: Full ROM, Normal Inspection.  absent: Lymphadenopathy





- Respiratory Exam


Respiratory Exam: Clear to Ausculation Bilateral, NORMAL BREATHING PATTERN





- Cardiovascular Exam


Cardiovascular Exam: REGULAR RHYTHM, RRR, +S1, +S2.  absent: Murmur





- GI/Abdominal Exam


GI & Abdominal Exam: Soft, Normal Bowel Sounds.  absent: Tenderness





- Extremities Exam


Extremities Exam: Full ROM, Normal Capillary Refill, Normal Inspection.  absent

: Joint Swelling, Pedal Edema





- Back Exam


Back Exam: NORMAL INSPECTION





- Neurological Exam


Neurological Exam: Alert, Awake, CN II-XII Intact, Normal Gait, Oriented x3





- Psychiatric Exam


Psychiatric exam: Normal Affect, Normal Mood





- Skin


Skin Exam: Dry, Intact, Normal Color, Warm





Assessment and Plan


(1) DVT prophylaxis


Status: Chronic





(2) CAD (coronary artery disease)


Status: Chronic





(3) Smoking


Status: Chronic





(4) Low back pain


Status: Chronic





(5) Scrotal edema


Status: Chronic





(6) Prediabetes


Status: Chronic





(7) Neurocognitive disorder


Status: Chronic








- Assessment and Plan (Free Text)


Assessment: 


(1) DVT prophylaxis


Assessment & Plan: 


scd and ae hose


ambulation


Status: Chronic





(2) CAD (coronary artery disease)


Assessment & Plan: 


cont meds


Status: Chronic





(3) Smoking


Assessment & Plan: 


nicoderm


Status: Chronic





(4) Low back pain


Assessment & Plan: 


ibuprofen prn


Status: Chronic





(5) Scrotal edema


Status: Chronic





(6) Prediabetes


Assessment & Plan: 


dietary


Status: Chronic





(7) Neurocognitive disorder


Assessment & Plan: 


cont med


pendign placement


Status: Chronic

## 2017-02-18 RX ADMIN — DIVALPROEX SODIUM SCH MG: 500 TABLET, DELAYED RELEASE ORAL at 08:49

## 2017-02-18 RX ADMIN — DIVALPROEX SODIUM SCH MG: 500 TABLET, DELAYED RELEASE ORAL at 16:39

## 2017-02-18 NOTE — CP.PCM.PN
Subjective





- Date & Time of Evaluation


Date of Evaluation: 02/18/17


Time of Evaluation: 06:48





- Subjective


Subjective: 


no complaints/distress


no f/c, n/v/d


penidng placement





Objective





- Vital Signs/Intake and Output


Vital Signs (last 24 hours): 


 











Temp Pulse Resp BP Pulse Ox


 


 98.9 F   94 H  20   131/82   97 


 


 02/17/17 21:22  02/17/17 21:51  02/17/17 21:22  02/17/17 21:51  02/17/17 21:22











- Medications


Medications: 


 Current Medications





Aspirin (Ecotrin)  81 mg PO DAILY [facility]


   Last Admin: 02/17/17 10:09 Dose:  81 mg


Atorvastatin Calcium (Lipitor)  20 mg PO HS [facility]


   Last Admin: 02/17/17 21:51 Dose:  20 mg


Divalproex Sodium (Depakote Dr(*Bid*))  500 mg PO BID [facility]


   Last Admin: 02/17/17 16:55 Dose:  500 mg


Docusate Sodium (Colace)  100 mg PO BID [facility]


   Last Admin: 02/17/17 16:55 Dose:  100 mg


Enalapril Maleate (Vasotec)  10 mg PO DAILY [facility]


   Last Admin: 02/17/17 10:11 Dose:  Not Given


Famotidine (Pepcid)  20 mg PO BID [facility]


   Last Admin: 02/17/17 16:56 Dose:  20 mg


Ibuprofen (Motrin Tab)  600 mg PO Q8 PRN


   PRN Reason: Pain, moderate (4-7)


   Last Admin: 01/06/17 16:32 Dose:  600 mg


Metoprolol Tartrate (Lopressor)  50 mg PO Q12 [facility]


   Last Admin: 02/17/17 21:51 Dose:  50 mg


Quetiapine Fumarate (Seroquel)  25 mg PO HS [facility]


   Last Admin: 02/17/17 21:51 Dose:  25 mg











- Labs


Labs: 


 





 02/02/17 10:12 





 02/02/17 10:12 





 











PT  11.2 SECONDS (9.4-12.0)   12/14/15  05:20    


 


INR  1.05  (0.89-1.20)   12/14/15  05:20    


 


APTT  36.2 SECONDS (23.1-32.0)  H  12/14/15  05:20    














- Constitutional


Appears: Well, Non-toxic, No Acute Distress





- Head Exam


Head Exam: ATRAUMATIC, NORMAL INSPECTION, NORMOCEPHALIC





- Eye Exam


Eye Exam: EOMI, Normal appearance, PERRL


Pupil Exam: NORMAL ACCOMODATION, PERRL





- ENT Exam


ENT Exam: Mucous Membranes Moist, Normal Exam





- Neck Exam


Neck Exam: Full ROM, Normal Inspection.  absent: Lymphadenopathy





- Respiratory Exam


Respiratory Exam: Clear to Ausculation Bilateral, NORMAL BREATHING PATTERN





- Cardiovascular Exam


Cardiovascular Exam: REGULAR RHYTHM, RRR, +S1, +S2.  absent: Murmur





- GI/Abdominal Exam


GI & Abdominal Exam: Soft, Normal Bowel Sounds.  absent: Tenderness





- Extremities Exam


Extremities Exam: Full ROM, Normal Capillary Refill, Normal Inspection.  absent

: Joint Swelling, Pedal Edema





- Back Exam


Back Exam: NORMAL INSPECTION





- Neurological Exam


Neurological Exam: Alert, Awake, CN II-XII Intact, Normal Gait, Oriented x3





- Psychiatric Exam


Psychiatric exam: Normal Affect, Normal Mood





- Skin


Skin Exam: Dry, Intact, Normal Color, Warm





Assessment and Plan


(1) DVT prophylaxis


Status: Chronic





(2) CAD (coronary artery disease)


Status: Chronic





(3) Smoking


Status: Chronic





(4) Low back pain


Status: Chronic





(5) Scrotal edema


Status: Chronic





(6) Prediabetes


Status: Chronic





(7) Neurocognitive disorder


Status: Chronic








- Assessment and Plan (Free Text)


Assessment: 


(1) DVT prophylaxis


Assessment & Plan: 


scd and ae hose


ambulation


Status: Chronic





(2) CAD (coronary artery disease)


Assessment & Plan: 


cont meds


Status: Chronic





(3) Smoking


Assessment & Plan: 


nicoderm


Status: Chronic





(4) Low back pain


Assessment & Plan: 


ibuprofen prn


Status: Chronic





(5) Scrotal edema


Status: Chronic





(6) Prediabetes


Assessment & Plan: 


dietary


Status: Chronic





(7) Neurocognitive disorder


Assessment & Plan: 


cont med


pendign placement


Status: Chronic

## 2017-02-19 RX ADMIN — DIVALPROEX SODIUM SCH MG: 500 TABLET, DELAYED RELEASE ORAL at 16:53

## 2017-02-19 RX ADMIN — DIVALPROEX SODIUM SCH MG: 500 TABLET, DELAYED RELEASE ORAL at 08:15

## 2017-02-19 NOTE — CP.PCM.PN
Subjective





- Date & Time of Evaluation


Date of Evaluation: 02/19/17


Time of Evaluation: 07:35





- Subjective


Subjective: 


dictated note


nodistress/complaints


nof/cmn/v/d


pending placement


lh 3rd digit dried blood noted, podiatry for wound care


tdap pending





Objective





- Vital Signs/Intake and Output


Vital Signs (last 24 hours): 


 











Temp Pulse Resp BP Pulse Ox


 


 98.4 F   70   18   121/79   98 


 


 02/18/17 20:56  02/18/17 20:58  02/18/17 20:56  02/18/17 20:58  02/18/17 20:56











- Medications


Medications: 


 Current Medications





Aspirin (Ecotrin)  81 mg PO DAILY [facility]


   Last Admin: 02/18/17 08:49 Dose:  81 mg


Atorvastatin Calcium (Lipitor)  20 mg PO HS [facility]


   Last Admin: 02/18/17 20:59 Dose:  20 mg


Divalproex Sodium (Depakote Dr(*Bid*))  500 mg PO BID [facility]


   Last Admin: 02/18/17 16:39 Dose:  500 mg


Docusate Sodium (Colace)  100 mg PO BID [facility]


   Last Admin: 02/18/17 16:39 Dose:  100 mg


Enalapril Maleate (Vasotec)  10 mg PO DAILY [facility]


   Last Admin: 02/18/17 08:49 Dose:  10 mg


Famotidine (Pepcid)  20 mg PO BID [facility]


   Last Admin: 02/18/17 16:39 Dose:  20 mg


Ibuprofen (Motrin Tab)  600 mg PO Q8 PRN


   PRN Reason: Pain, moderate (4-7)


   Last Admin: 01/06/17 16:32 Dose:  600 mg


Metoprolol Tartrate (Lopressor)  50 mg PO Q12 [facility]


   Last Admin: 02/18/17 20:58 Dose:  50 mg


Quetiapine Fumarate (Seroquel)  25 mg PO HS [facility]


   Last Admin: 02/18/17 20:59 Dose:  25 mg











- Labs


Labs: 


 





 02/02/17 10:12 





 02/02/17 10:12 





 











PT  11.2 SECONDS (9.4-12.0)   12/14/15  05:20    


 


INR  1.05  (0.89-1.20)   12/14/15  05:20    


 


APTT  36.2 SECONDS (23.1-32.0)  H  12/14/15  05:20    














Assessment and Plan


(1) DVT prophylaxis


Status: Chronic





(2) CAD (coronary artery disease)


Status: Chronic





(3) Smoking


Status: Chronic





(4) Low back pain


Status: Chronic





(5) Scrotal edema


Status: Chronic





(6) Prediabetes


Status: Chronic





(7) Neurocognitive disorder


Status: Chronic

## 2017-02-19 NOTE — PN
DATE: 02/19/2017



The patient is doing well pending placement  No complaints.  No distress.  No 
fever, chills, nausea, vomiting, diarrhea.  Left hand middle finger noted with 
some bleeding around the nail bed, happened yesterday.  The patient states that 
he hit his finger on the bed frame.  No distress, no complaints.  Will update 
tetanus as patient has an unknown Tdap status.  The patient remains pending 
placement.



REVIEW OF SYSTEMS:  Negative except for the nail bed injury.



PHYSICAL EXAMINATION:

GENERAL:  Alert and oriented.

HEART:  Regular rate and rhythm.  No murmurs, rubs, or gallops.

LUNGS:  Clear in all fields bilaterally.

ABDOMEN:  Soft, nontender.  Bowel sounds x 4.

EXTREMITIES:  Distal pulses, motor sensation intact.  Cap refill is brisk left 
hand, middle finger, trauma to the nail bed with some dried blood around the 
perimeter.



DIAGNOSES AND PLAN:  We will continue to follow current treatment.  Continue 
all current monitoring.  Continue all current , plan and 
intervention.  We will continue to work with  on placement for 
this patient.  Will ask podiatry team to generalized nail care, update patient'
s Tdap status and will continue to monitor the patient closely.





__________________________________________

Fan SILVER







cc:   



DD: 02/19/2017 09:40:19  [id number]

TT: 02/19/2017 10:23:52

Confirmation # [id number]

Dictation # [id number]

kali JONAS

## 2017-02-20 RX ADMIN — DIVALPROEX SODIUM SCH MG: 500 TABLET, DELAYED RELEASE ORAL at 16:35

## 2017-02-20 RX ADMIN — DIVALPROEX SODIUM SCH MG: 500 TABLET, DELAYED RELEASE ORAL at 08:35

## 2017-02-20 NOTE — CP.PCM.PN
Subjective





- Date & Time of Evaluation


Date of Evaluation: 02/20/17


Time of Evaluation: 08:14





- Subjective


Subjective: 





nodistress/complaints


nof/cmn/v/d


pending placement


lh 3rd digit dried blood noted, podiatry for wound care








Objective





- Vital Signs/Intake and Output


Vital Signs (last 24 hours): 


 











Temp Pulse Resp BP Pulse Ox


 


 97.4 F L  71   20   113/75   95 


 


 02/20/17 08:02  02/20/17 08:02  02/20/17 08:02  02/20/17 08:02  02/20/17 08:02











- Medications


Medications: 


 Current Medications





Aspirin (Ecotrin)  81 mg PO DAILY [facility]


   Last Admin: 02/19/17 08:15 Dose:  81 mg


Atorvastatin Calcium (Lipitor)  20 mg PO HS [facility]


   Last Admin: 02/19/17 21:15 Dose:  20 mg


Divalproex Sodium (Depakote Dr(*Bid*))  500 mg PO BID [facility]


   Last Admin: 02/19/17 16:53 Dose:  500 mg


Docusate Sodium (Colace)  100 mg PO BID [facility]


   Last Admin: 02/19/17 16:53 Dose:  100 mg


Enalapril Maleate (Vasotec)  10 mg PO DAILY [facility]


   Last Admin: 02/19/17 08:15 Dose:  Not Given


Famotidine (Pepcid)  20 mg PO BID [facility]


   Last Admin: 02/19/17 16:53 Dose:  20 mg


Ibuprofen (Motrin Tab)  600 mg PO Q8 PRN


   PRN Reason: Pain, moderate (4-7)


   Last Admin: 01/06/17 16:32 Dose:  600 mg


Metoprolol Tartrate (Lopressor)  50 mg PO Q12 [facility]


   Last Admin: 02/19/17 21:15 Dose:  50 mg


Quetiapine Fumarate (Seroquel)  25 mg PO HS [facility]


   Last Admin: 02/19/17 21:15 Dose:  25 mg











- Labs


Labs: 


 





 02/02/17 10:12 





 02/02/17 10:12 





 











PT  11.2 SECONDS (9.4-12.0)   12/14/15  05:20    


 


INR  1.05  (0.89-1.20)   12/14/15  05:20    


 


APTT  36.2 SECONDS (23.1-32.0)  H  12/14/15  05:20    














- Constitutional


Appears: Well, Non-toxic, No Acute Distress





- Head Exam


Head Exam: ATRAUMATIC, NORMAL INSPECTION, NORMOCEPHALIC





- Eye Exam


Eye Exam: EOMI, Normal appearance, PERRL


Pupil Exam: NORMAL ACCOMODATION, PERRL





- ENT Exam


ENT Exam: Mucous Membranes Moist, Normal Exam





- Neck Exam


Neck Exam: Full ROM, Normal Inspection.  absent: Lymphadenopathy





- Respiratory Exam


Respiratory Exam: Clear to Ausculation Bilateral, NORMAL BREATHING PATTERN





- Cardiovascular Exam


Cardiovascular Exam: REGULAR RHYTHM, RRR, +S1, +S2.  absent: Murmur





- GI/Abdominal Exam


GI & Abdominal Exam: Soft, Normal Bowel Sounds.  absent: Tenderness





- Extremities Exam


Extremities Exam: Full ROM, Normal Capillary Refill, Normal Inspection.  absent

: Joint Swelling, Pedal Edema





- Back Exam


Back Exam: NORMAL INSPECTION





- Neurological Exam


Neurological Exam: Alert, Awake, CN II-XII Intact, Normal Gait, Oriented x3





- Psychiatric Exam


Psychiatric exam: Normal Affect, Normal Mood





- Skin


Skin Exam: Dry, Intact, Normal Color, Warm





Assessment and Plan


(1) DVT prophylaxis


Status: Chronic





(2) CAD (coronary artery disease)


Status: Chronic





(3) Smoking


Status: Chronic





(4) Low back pain


Status: Chronic





(5) Scrotal edema


Status: Chronic





(6) Prediabetes


Status: Chronic





(7) Neurocognitive disorder


Status: Chronic








- Assessment and Plan (Free Text)


Assessment: 


(1) DVT prophylaxis


Assessment & Plan: 


scd and ae hose


ambulation


Status: Chronic





(2) CAD (coronary artery disease)


Assessment & Plan: 


cont meds


Status: Chronic





(3) Smoking


Assessment & Plan: 


nicoderm


Status: Chronic





(4) Low back pain


Assessment & Plan: 


ibuprofen prn


Status: Chronic





(5) Scrotal edema


Status: Chronic





(6) Prediabetes


Assessment & Plan: 


dietary


Status: Chronic





(7) Neurocognitive disorder


Assessment & Plan: 


cont med


pendign placement


Status: Chronic





8-lh 3rd digit wound, wound care, tdap, will kelly

## 2017-02-21 RX ADMIN — DIVALPROEX SODIUM SCH MG: 500 TABLET, DELAYED RELEASE ORAL at 09:29

## 2017-02-21 RX ADMIN — DIVALPROEX SODIUM SCH MG: 500 TABLET, DELAYED RELEASE ORAL at 16:36

## 2017-02-21 NOTE — CP.PCM.PN
Subjective





- Date & Time of Evaluation


Date of Evaluation: 02/21/17


Time of Evaluation: 06:58





- Subjective


Subjective: 


no distress/.complaints. nof /c, n/v/d


pendign placement





Objective





- Vital Signs/Intake and Output


Vital Signs (last 24 hours): 


 











Temp Pulse Resp BP Pulse Ox


 


 98.9 F   83   20   106/72   97 


 


 02/20/17 21:12  02/20/17 21:16  02/20/17 21:12  02/20/17 21:16  02/20/17 21:12











- Medications


Medications: 


 Current Medications





Aspirin (Ecotrin)  81 mg PO DAILY [facility]


   Last Admin: 02/20/17 08:36 Dose:  81 mg


Atorvastatin Calcium (Lipitor)  20 mg PO HS [facility]


   Last Admin: 02/20/17 21:16 Dose:  20 mg


Divalproex Sodium (Depakote Dr(*Bid*))  500 mg PO BID [facility]


   Last Admin: 02/20/17 16:35 Dose:  500 mg


Docusate Sodium (Colace)  100 mg PO BID [facility]


   Last Admin: 02/20/17 16:35 Dose:  100 mg


Enalapril Maleate (Vasotec)  10 mg PO DAILY [facility]


   Last Admin: 02/20/17 08:36 Dose:  10 mg


Famotidine (Pepcid)  20 mg PO BID [facility]


   Last Admin: 02/20/17 16:35 Dose:  20 mg


Ibuprofen (Motrin Tab)  600 mg PO Q8 PRN


   PRN Reason: Pain, moderate (4-7)


   Last Admin: 01/06/17 16:32 Dose:  600 mg


Metoprolol Tartrate (Lopressor)  50 mg PO Q12 [facility]


   Last Admin: 02/20/17 21:16 Dose:  Not Given


Quetiapine Fumarate (Seroquel)  25 mg PO HS [facility]


   Last Admin: 02/20/17 21:16 Dose:  25 mg











- Labs


Labs: 


 





 02/02/17 10:12 





 02/02/17 10:12 





 











PT  11.2 SECONDS (9.4-12.0)   12/14/15  05:20    


 


INR  1.05  (0.89-1.20)   12/14/15  05:20    


 


APTT  36.2 SECONDS (23.1-32.0)  H  12/14/15  05:20    














- Constitutional


Appears: Well, Non-toxic, No Acute Distress





- Head Exam


Head Exam: ATRAUMATIC, NORMAL INSPECTION, NORMOCEPHALIC





- Eye Exam


Eye Exam: EOMI, Normal appearance, PERRL


Pupil Exam: NORMAL ACCOMODATION, PERRL





- ENT Exam


ENT Exam: Mucous Membranes Moist, Normal Exam





- Neck Exam


Neck Exam: Full ROM, Normal Inspection.  absent: Lymphadenopathy





- Respiratory Exam


Respiratory Exam: Clear to Ausculation Bilateral, NORMAL BREATHING PATTERN





- Cardiovascular Exam


Cardiovascular Exam: REGULAR RHYTHM, +S1, +S2.  absent: Murmur





- GI/Abdominal Exam


GI & Abdominal Exam: Soft, Normal Bowel Sounds.  absent: Tenderness





-  Exam


Bimanual exam: NORMAL BIMANUAL EXAM





- Extremities Exam


Extremities Exam: Full ROM, Normal Capillary Refill, Normal Inspection.  absent

: Joint Swelling, Pedal Edema





- Back Exam


Back Exam: NORMAL INSPECTION





- Neurological Exam


Neurological Exam: Alert, Awake, CN II-XII Intact, Normal Gait, Oriented x3





- Psychiatric Exam


Psychiatric exam: Normal Affect, Normal Mood





- Skin


Skin Exam: Dry, Intact, Normal Color, Warm





Assessment and Plan


(1) DVT prophylaxis


Status: Chronic





(2) CAD (coronary artery disease)


Status: Chronic





(3) Smoking


Status: Chronic





(4) Low back pain


Status: Chronic





(5) Scrotal edema


Status: Chronic





(6) Prediabetes


Status: Chronic





(7) Neurocognitive disorder


Status: Chronic








- Assessment and Plan (Free Text)


Assessment: 


(1) DVT prophylaxis


Assessment & Plan: 


scd and ae hose


ambulation


Status: Chronic





(2) CAD (coronary artery disease)


Assessment & Plan: 


cont meds


Status: Chronic





(3) Smoking


Assessment & Plan: 


nicoderm


Status: Chronic





(4) Low back pain


Assessment & Plan: 


ibuprofen prn


Status: Chronic





(5) Scrotal edema


Status: Chronic





(6) Prediabetes


Assessment & Plan: 


dietary


Status: Chronic





(7) Neurocognitive disorder


Assessment & Plan: 


cont med


pendign placement


Status: Chronic





8-lh 3rd digit wound, wound care, tdap, will kelly

## 2017-02-22 RX ADMIN — DIVALPROEX SODIUM SCH MG: 500 TABLET, DELAYED RELEASE ORAL at 08:17

## 2017-02-22 RX ADMIN — DIVALPROEX SODIUM SCH MG: 500 TABLET, DELAYED RELEASE ORAL at 16:02

## 2017-02-22 NOTE — CP.PCM.PN
Subjective





- Date & Time of Evaluation


Date of Evaluation: 02/22/17


Time of Evaluation: 08:24





- Subjective


Subjective: 


no complaints/distress


no f/c, n/v/d


pending placement





Objective





- Vital Signs/Intake and Output


Vital Signs (last 24 hours): 


 











Temp Pulse Resp BP Pulse Ox


 


 97.8 F   62   20   100/67   97 


 


 02/22/17 07:41  02/22/17 08:17  02/22/17 07:41  02/22/17 07:41  02/22/17 07:41











- Medications


Medications: 


 Current Medications





Aspirin (Ecotrin)  81 mg PO DAILY [facility]


   Last Admin: 02/22/17 08:17 Dose:  81 mg


Atorvastatin Calcium (Lipitor)  20 mg PO HS [facility]


   Last Admin: 02/21/17 21:44 Dose:  20 mg


Divalproex Sodium (Depakote Dr(*Bid*))  500 mg PO BID [facility]


   Last Admin: 02/22/17 08:17 Dose:  500 mg


Docusate Sodium (Colace)  100 mg PO BID [facility]


   Last Admin: 02/22/17 08:17 Dose:  100 mg


Enalapril Maleate (Vasotec)  10 mg PO DAILY [facility]


   Last Admin: 02/22/17 08:17 Dose:  10 mg


Famotidine (Pepcid)  20 mg PO BID [facility]


   Last Admin: 02/22/17 08:17 Dose:  20 mg


Ibuprofen (Motrin Tab)  600 mg PO Q8 PRN


   PRN Reason: Pain, moderate (4-7)


   Last Admin: 01/06/17 16:32 Dose:  600 mg


Metoprolol Tartrate (Lopressor)  50 mg PO Q12 [facility]


   Last Admin: 02/22/17 08:17 Dose:  50 mg


Quetiapine Fumarate (Seroquel)  25 mg PO HS [facility]


   Last Admin: 02/21/17 21:44 Dose:  25 mg











- Labs


Labs: 


 





 02/02/17 10:12 





 02/02/17 10:12 





 











PT  11.2 SECONDS (9.4-12.0)   12/14/15  05:20    


 


INR  1.05  (0.89-1.20)   12/14/15  05:20    


 


APTT  36.2 SECONDS (23.1-32.0)  H  12/14/15  05:20    














- Constitutional


Appears: Well, Non-toxic, No Acute Distress





- Head Exam


Head Exam: ATRAUMATIC, NORMAL INSPECTION, NORMOCEPHALIC





- Eye Exam


Eye Exam: EOMI, Normal appearance, PERRL


Pupil Exam: NORMAL ACCOMODATION, PERRL





- ENT Exam


ENT Exam: Mucous Membranes Moist, Normal Exam





- Neck Exam


Neck Exam: Full ROM, Normal Inspection.  absent: Lymphadenopathy





- Respiratory Exam


Respiratory Exam: Clear to Ausculation Bilateral, NORMAL BREATHING PATTERN





- Cardiovascular Exam


Cardiovascular Exam: REGULAR RHYTHM, RRR, +S1, +S2.  absent: Murmur





- GI/Abdominal Exam


GI & Abdominal Exam: Soft, Normal Bowel Sounds.  absent: Tenderness





- Extremities Exam


Extremities Exam: Full ROM, Normal Capillary Refill, Normal Inspection.  absent

: Joint Swelling, Pedal Edema





- Back Exam


Back Exam: NORMAL INSPECTION





- Neurological Exam


Neurological Exam: Alert, Awake, CN II-XII Intact, Normal Gait, Oriented x3





- Psychiatric Exam


Psychiatric exam: Normal Affect, Normal Mood





- Skin


Skin Exam: Dry, Intact, Normal Color, Warm





Assessment and Plan


(1) DVT prophylaxis


Status: Chronic





(2) CAD (coronary artery disease)


Status: Chronic





(3) Smoking


Status: Chronic





(4) Low back pain


Status: Chronic





(5) Scrotal edema


Status: Chronic





(6) Prediabetes


Status: Chronic





(7) Neurocognitive disorder


Status: Chronic








- Assessment and Plan (Free Text)


Assessment: 


(1) DVT prophylaxis


Assessment & Plan: 


scd and ae hose


ambulation


Status: Chronic





(2) CAD (coronary artery disease)


Assessment & Plan: 


cont meds


Status: Chronic





(3) Smoking


Assessment & Plan: 


nicoderm


Status: Chronic





(4) Low back pain


Assessment & Plan: 


ibuprofen prn


Status: Chronic





(5) Scrotal edema


Status: Chronic





(6) Prediabetes


Assessment & Plan: 


dietary


Status: Chronic





(7) Neurocognitive disorder


Assessment & Plan: 


cont med


pendign placement


Status: Chronic





8-lh 3rd digit wound, wound care, tdap, will monroeor

## 2017-02-23 RX ADMIN — DIVALPROEX SODIUM SCH MG: 500 TABLET, DELAYED RELEASE ORAL at 16:04

## 2017-02-23 RX ADMIN — DIVALPROEX SODIUM SCH MG: 500 TABLET, DELAYED RELEASE ORAL at 08:09

## 2017-02-23 NOTE — CP.PCM.PN
Subjective





- Date & Time of Evaluation


Date of Evaluation: 02/23/17


Time of Evaluation: 08:22





- Subjective


Subjective: 


no complaints/distress


no f/c, nv//d


penidng palcement





Objective





- Vital Signs/Intake and Output


Vital Signs (last 24 hours): 


 











Temp Pulse Resp BP Pulse Ox


 


 97.3 F L  67   20   99/65 L  94 L


 


 02/23/17 07:52  02/23/17 08:10  02/23/17 07:52  02/23/17 07:52  02/23/17 07:52











- Medications


Medications: 


 Current Medications





Aspirin (Ecotrin)  81 mg PO DAILY [facility]


   Last Admin: 02/23/17 08:09 Dose:  81 mg


Atorvastatin Calcium (Lipitor)  20 mg PO HS [facility]


   Last Admin: 02/22/17 21:09 Dose:  20 mg


Divalproex Sodium (Depakote Dr(*Bid*))  500 mg PO BID [facility]


   Last Admin: 02/23/17 08:09 Dose:  500 mg


Docusate Sodium (Colace)  100 mg PO BID [facility]


   Last Admin: 02/23/17 08:09 Dose:  100 mg


Enalapril Maleate (Vasotec)  10 mg PO DAILY [facility]


   Last Admin: 02/23/17 08:10 Dose:  10 mg


Famotidine (Pepcid)  20 mg PO BID [facility]


   Last Admin: 02/23/17 08:09 Dose:  20 mg


Ibuprofen (Motrin Tab)  600 mg PO Q8 PRN


   PRN Reason: Pain, moderate (4-7)


   Last Admin: 01/06/17 16:32 Dose:  600 mg


Metoprolol Tartrate (Lopressor)  50 mg PO Q12 [facility]


   Last Admin: 02/23/17 08:10 Dose:  50 mg


Quetiapine Fumarate (Seroquel)  25 mg PO HS [facility]


   Last Admin: 02/22/17 21:08 Dose:  25 mg











- Labs


Labs: 


 





 02/02/17 10:12 





 02/02/17 10:12 





 











PT  11.2 SECONDS (9.4-12.0)   12/14/15  05:20    


 


INR  1.05  (0.89-1.20)   12/14/15  05:20    


 


APTT  36.2 SECONDS (23.1-32.0)  H  12/14/15  05:20    














- Constitutional


Appears: Well, Non-toxic, No Acute Distress





- Head Exam


Head Exam: ATRAUMATIC, NORMAL INSPECTION, NORMOCEPHALIC





- Eye Exam


Eye Exam: EOMI, Normal appearance, PERRL


Pupil Exam: NORMAL ACCOMODATION, PERRL





- ENT Exam


ENT Exam: Mucous Membranes Moist, Normal Exam





- Neck Exam


Neck Exam: Full ROM, Normal Inspection.  absent: Lymphadenopathy





- Respiratory Exam


Respiratory Exam: Clear to Ausculation Bilateral, NORMAL BREATHING PATTERN





- Cardiovascular Exam


Cardiovascular Exam: REGULAR RHYTHM, +S1, +S2.  absent: Murmur





- GI/Abdominal Exam


GI & Abdominal Exam: Soft, Normal Bowel Sounds.  absent: Tenderness





- Extremities Exam


Extremities Exam: Full ROM, Normal Capillary Refill, Normal Inspection.  absent

: Joint Swelling, Pedal Edema





- Back Exam


Back Exam: NORMAL INSPECTION





- Neurological Exam


Neurological Exam: Alert, Awake, CN II-XII Intact, Normal Gait, Oriented x3





- Psychiatric Exam


Psychiatric exam: Normal Affect, Normal Mood





- Skin


Skin Exam: Dry, Intact, Normal Color, Warm





Assessment and Plan


(1) DVT prophylaxis


Status: Chronic





(2) CAD (coronary artery disease)


Status: Chronic





(3) Smoking


Status: Chronic





(4) Low back pain


Status: Chronic





(5) Scrotal edema


Status: Chronic





(6) Prediabetes


Status: Chronic





(7) Neurocognitive disorder


Status: Chronic








- Assessment and Plan (Free Text)


Assessment: 


(1) DVT prophylaxis


Assessment & Plan: 


scd and ae hose


ambulation


Status: Chronic





(2) CAD (coronary artery disease)


Assessment & Plan: 


cont meds


Status: Chronic





(3) Smoking


Assessment & Plan: 


nicoderm


Status: Chronic





(4) Low back pain


Assessment & Plan: 


ibuprofen prn


Status: Chronic





(5) Scrotal edema


Status: Chronic





(6) Prediabetes


Assessment & Plan: 


dietary


Status: Chronic





(7) Neurocognitive disorder


Assessment & Plan: 


cont med


pendign placement


Status: Chronic





8-lh 3rd digit wound, wound care, tdap, will kelly

## 2017-02-24 RX ADMIN — DIVALPROEX SODIUM SCH MG: 500 TABLET, DELAYED RELEASE ORAL at 08:44

## 2017-02-24 RX ADMIN — DIVALPROEX SODIUM SCH MG: 500 TABLET, DELAYED RELEASE ORAL at 16:03

## 2017-02-24 NOTE — CP.PCM.PN
Subjective





- Date & Time of Evaluation


Date of Evaluation: 02/24/17


Time of Evaluation: 10:15





- Subjective


Subjective: 


no f/c, n/v/d. penidng palcement


njo complaints/distress





Objective





- Vital Signs/Intake and Output


Vital Signs (last 24 hours): 


 











Temp Pulse Resp BP Pulse Ox


 


 97.7 F   68   20   119/79   94 L


 


 02/24/17 07:54  02/24/17 08:45  02/24/17 07:54  02/24/17 08:45  02/24/17 07:54











- Medications


Medications: 


 Current Medications





Aspirin (Ecotrin)  81 mg PO DAILY [facility]


   Last Admin: 02/24/17 08:44 Dose:  81 mg


Atorvastatin Calcium (Lipitor)  20 mg PO HS [facility]


   Last Admin: 02/23/17 21:29 Dose:  20 mg


Divalproex Sodium (Depakote Dr(*Bid*))  500 mg PO BID [facility]


   Last Admin: 02/24/17 08:44 Dose:  500 mg


Docusate Sodium (Colace)  100 mg PO BID [facility]


   Last Admin: 02/24/17 08:44 Dose:  100 mg


Enalapril Maleate (Vasotec)  10 mg PO DAILY [facility]


   Last Admin: 02/24/17 08:45 Dose:  10 mg


Famotidine (Pepcid)  20 mg PO BID [facility]


   Last Admin: 02/24/17 08:45 Dose:  20 mg


Ibuprofen (Motrin Tab)  600 mg PO Q8 PRN


   PRN Reason: Pain, moderate (4-7)


   Last Admin: 01/06/17 16:32 Dose:  600 mg


Metoprolol Tartrate (Lopressor)  50 mg PO Q12 [facility]


   Last Admin: 02/24/17 08:45 Dose:  50 mg


Quetiapine Fumarate (Seroquel)  25 mg PO HS [facility]


   Last Admin: 02/23/17 21:30 Dose:  25 mg











- Labs


Labs: 


 





 02/02/17 10:12 





 02/02/17 10:12 





 











PT  11.2 SECONDS (9.4-12.0)   12/14/15  05:20    


 


INR  1.05  (0.89-1.20)   12/14/15  05:20    


 


APTT  36.2 SECONDS (23.1-32.0)  H  12/14/15  05:20    














- Constitutional


Appears: Well, Non-toxic, No Acute Distress





- Head Exam


Head Exam: ATRAUMATIC, NORMAL INSPECTION, NORMOCEPHALIC





- Eye Exam


Eye Exam: EOMI, Normal appearance, PERRL


Pupil Exam: NORMAL ACCOMODATION, PERRL





- ENT Exam


ENT Exam: Mucous Membranes Moist, Normal Exam





- Neck Exam


Neck Exam: Full ROM, Normal Inspection.  absent: Lymphadenopathy





- Respiratory Exam


Respiratory Exam: Clear to Ausculation Bilateral, NORMAL BREATHING PATTERN





- Cardiovascular Exam


Cardiovascular Exam: REGULAR RHYTHM, RRR, +S1, +S2.  absent: Murmur





- GI/Abdominal Exam


GI & Abdominal Exam: Soft, Normal Bowel Sounds.  absent: Tenderness





- Extremities Exam


Extremities Exam: Full ROM, Normal Capillary Refill, Normal Inspection.  absent

: Joint Swelling, Pedal Edema





- Back Exam


Back Exam: NORMAL INSPECTION





- Neurological Exam


Neurological Exam: Alert, Awake, CN II-XII Intact, Normal Gait, Oriented x3





- Psychiatric Exam


Psychiatric exam: Normal Affect, Normal Mood





- Skin


Skin Exam: Dry, Intact, Normal Color, Warm





Assessment and Plan


(1) DVT prophylaxis


Status: Chronic





(2) CAD (coronary artery disease)


Status: Chronic





(3) Smoking


Status: Chronic





(4) Low back pain


Status: Chronic





(5) Scrotal edema


Status: Chronic





(6) Prediabetes


Status: Chronic





(7) Neurocognitive disorder


Status: Chronic

## 2017-02-25 RX ADMIN — DIVALPROEX SODIUM SCH MG: 500 TABLET, DELAYED RELEASE ORAL at 08:30

## 2017-02-25 RX ADMIN — DIVALPROEX SODIUM SCH MG: 500 TABLET, DELAYED RELEASE ORAL at 17:38

## 2017-02-25 NOTE — CP.PCM.PN
Subjective





- Date & Time of Evaluation


Date of Evaluation: 02/25/17


Time of Evaluation: 16:50





- Subjective


Subjective: 


no f/c, n/v/d


pending palcement


no complaints/distress








Objective





- Vital Signs/Intake and Output


Vital Signs (last 24 hours): 


 











Temp Pulse Resp BP Pulse Ox


 


 97.4 F L  66   20   135/85   98 


 


 02/25/17 08:47  02/25/17 08:47  02/25/17 08:47  02/25/17 08:47  02/25/17 08:47











- Medications


Medications: 


 Current Medications





Aspirin (Ecotrin)  81 mg PO DAILY [facility]


   Last Admin: 02/25/17 08:30 Dose:  81 mg


Atorvastatin Calcium (Lipitor)  20 mg PO HS [facility]


   Last Admin: 02/24/17 21:06 Dose:  20 mg


Divalproex Sodium (Depakote Dr(*Bid*))  500 mg PO BID [facility]


   Last Admin: 02/25/17 08:30 Dose:  500 mg


Docusate Sodium (Colace)  100 mg PO BID [facility]


   Last Admin: 02/25/17 08:30 Dose:  100 mg


Enalapril Maleate (Vasotec)  10 mg PO DAILY [facility]


   Last Admin: 02/25/17 08:31 Dose:  10 mg


Famotidine (Pepcid)  20 mg PO BID [facility]


   Last Admin: 02/25/17 08:31 Dose:  20 mg


Ibuprofen (Motrin Tab)  600 mg PO Q8 PRN


   PRN Reason: Pain, moderate (4-7)


   Last Admin: 01/06/17 16:32 Dose:  600 mg


Metoprolol Tartrate (Lopressor)  50 mg PO Q12 [facility]


   Last Admin: 02/25/17 08:31 Dose:  50 mg


Quetiapine Fumarate (Seroquel)  25 mg PO HS [facility]


   Last Admin: 02/24/17 21:06 Dose:  25 mg











- Labs


Labs: 


 





 02/02/17 10:12 





 02/02/17 10:12 





 











PT  11.2 SECONDS (9.4-12.0)   12/14/15  05:20    


 


INR  1.05  (0.89-1.20)   12/14/15  05:20    


 


APTT  36.2 SECONDS (23.1-32.0)  H  12/14/15  05:20    














- Constitutional


Appears: Well, Non-toxic, No Acute Distress





- Head Exam


Head Exam: ATRAUMATIC, NORMAL INSPECTION, NORMOCEPHALIC





- Eye Exam


Eye Exam: EOMI, Normal appearance, PERRL


Pupil Exam: NORMAL ACCOMODATION, PERRL





- ENT Exam


ENT Exam: Mucous Membranes Moist, Normal Exam





- Neck Exam


Neck Exam: Full ROM, Normal Inspection.  absent: Lymphadenopathy





- Respiratory Exam


Respiratory Exam: Clear to Ausculation Bilateral, NORMAL BREATHING PATTERN





- Cardiovascular Exam


Cardiovascular Exam: REGULAR RHYTHM, +S1, +S2.  absent: Murmur





- GI/Abdominal Exam


GI & Abdominal Exam: Soft, Normal Bowel Sounds.  absent: Tenderness





- Extremities Exam


Extremities Exam: Full ROM, Normal Capillary Refill, Normal Inspection.  absent

: Joint Swelling, Pedal Edema





- Back Exam


Back Exam: NORMAL INSPECTION





- Neurological Exam


Neurological Exam: Alert, Awake, CN II-XII Intact, Normal Gait, Oriented x3





- Psychiatric Exam


Psychiatric exam: Normal Affect, Normal Mood





- Skin


Skin Exam: Dry, Intact, Normal Color, Warm





Assessment and Plan


(1) DVT prophylaxis


Status: Chronic





(2) CAD (coronary artery disease)


Status: Chronic





(3) Smoking


Status: Chronic





(4) Low back pain


Status: Chronic





(5) Scrotal edema


Status: Chronic





(6) Prediabetes


Status: Chronic





(7) Neurocognitive disorder


Status: Chronic








- Assessment and Plan (Free Text)


Assessment: 


cont current plan/treatments

## 2017-02-26 RX ADMIN — DIVALPROEX SODIUM SCH MG: 500 TABLET, DELAYED RELEASE ORAL at 16:56

## 2017-02-26 RX ADMIN — DIVALPROEX SODIUM SCH MG: 500 TABLET, DELAYED RELEASE ORAL at 09:00

## 2017-02-26 NOTE — CP.PCM.PN
Subjective





- Date & Time of Evaluation


Date of Evaluation: 02/26/17


Time of Evaluation: 09:33





- Subjective


Subjective: 


no comlaints/distress


no f/c, n/v/d


pending placement





Objective





- Vital Signs/Intake and Output


Vital Signs (last 24 hours): 


 











Temp Pulse Resp BP Pulse Ox


 


 97.7 F   62   20   104/69   96 


 


 02/26/17 08:11  02/26/17 08:11  02/26/17 08:11  02/26/17 08:11  02/26/17 08:11











- Medications


Medications: 


 Current Medications





Aspirin (Ecotrin)  81 mg PO DAILY [facility]


   Last Admin: 02/25/17 08:30 Dose:  81 mg


Atorvastatin Calcium (Lipitor)  20 mg PO HS [facility]


   Last Admin: 02/25/17 21:23 Dose:  20 mg


Divalproex Sodium (Depakote Dr(*Bid*))  500 mg PO BID [facility]


   Last Admin: 02/25/17 17:38 Dose:  500 mg


Docusate Sodium (Colace)  100 mg PO BID [facility]


   Last Admin: 02/25/17 17:38 Dose:  100 mg


Enalapril Maleate (Vasotec)  10 mg PO DAILY [facility]


   Last Admin: 02/25/17 08:31 Dose:  10 mg


Famotidine (Pepcid)  20 mg PO BID [facility]


   Last Admin: 02/25/17 17:38 Dose:  20 mg


Ibuprofen (Motrin Tab)  600 mg PO Q8 PRN


   PRN Reason: Pain, moderate (4-7)


   Last Admin: 01/06/17 16:32 Dose:  600 mg


Metoprolol Tartrate (Lopressor)  50 mg PO Q12 [facility]


   Last Admin: 02/25/17 21:23 Dose:  50 mg


Quetiapine Fumarate (Seroquel)  25 mg PO HS [facility]


   Last Admin: 02/25/17 21:23 Dose:  25 mg











- Labs


Labs: 


 





 02/02/17 10:12 





 02/02/17 10:12 





 











PT  11.2 SECONDS (9.4-12.0)   12/14/15  05:20    


 


INR  1.05  (0.89-1.20)   12/14/15  05:20    


 


APTT  36.2 SECONDS (23.1-32.0)  H  12/14/15  05:20    














- Constitutional


Appears: Well, Non-toxic, No Acute Distress





- Head Exam


Head Exam: ATRAUMATIC, NORMAL INSPECTION, NORMOCEPHALIC





- Eye Exam


Eye Exam: EOMI, Normal appearance, PERRL


Pupil Exam: NORMAL ACCOMODATION, PERRL





- ENT Exam


ENT Exam: Mucous Membranes Moist, Normal Exam





- Neck Exam


Neck Exam: Full ROM, Normal Inspection.  absent: Lymphadenopathy





- Respiratory Exam


Respiratory Exam: Clear to Ausculation Bilateral, NORMAL BREATHING PATTERN





- Cardiovascular Exam


Cardiovascular Exam: REGULAR RHYTHM, +S1, +S2.  absent: Murmur





- GI/Abdominal Exam


GI & Abdominal Exam: Soft, Normal Bowel Sounds.  absent: Tenderness





- Extremities Exam


Extremities Exam: Full ROM, Normal Capillary Refill, Normal Inspection.  absent

: Joint Swelling, Pedal Edema





- Back Exam


Back Exam: NORMAL INSPECTION





- Neurological Exam


Neurological Exam: Alert, Awake, CN II-XII Intact, Normal Gait, Oriented x3





- Psychiatric Exam


Psychiatric exam: Normal Affect, Normal Mood





- Skin


Skin Exam: Dry, Intact, Normal Color, Warm





Assessment and Plan


(1) DVT prophylaxis


Status: Chronic





(2) CAD (coronary artery disease)


Status: Chronic





(3) Smoking


Status: Chronic





(4) Low back pain


Status: Chronic





(5) Scrotal edema


Status: Chronic





(6) Prediabetes


Status: Chronic





(7) Neurocognitive disorder


Status: Chronic








- Assessment and Plan (Free Text)


Assessment: 


(1) DVT prophylaxis


Assessment & Plan: 


scd and ae hose


ambulation


Status: Chronic





(2) CAD (coronary artery disease)


Assessment & Plan: 


cont meds


Status: Chronic





(3) Smoking


Assessment & Plan: 


nicoderm


Status: Chronic





(4) Low back pain


Assessment & Plan: 


ibuprofen prn


Status: Chronic





(5) Scrotal edema


Status: Chronic





(6) Prediabetes


Assessment & Plan: 


dietary


Status: Chronic





(7) Neurocognitive disorder


Assessment & Plan: 


cont med


pendign placement


Status: Chronic

## 2017-02-27 RX ADMIN — DIVALPROEX SODIUM SCH MG: 500 TABLET, DELAYED RELEASE ORAL at 08:24

## 2017-02-27 RX ADMIN — DIVALPROEX SODIUM SCH MG: 500 TABLET, DELAYED RELEASE ORAL at 17:06

## 2017-02-27 NOTE — CP.PCM.PN
Subjective





- Date & Time of Evaluation


Date of Evaluation: 02/27/17


Time of Evaluation: 07:18





- Subjective


Subjective: 


nocomplaints/distress


no f/c, n/v/d


pending placement





Objective





- Vital Signs/Intake and Output


Vital Signs (last 24 hours): 


 











Temp Pulse Resp BP Pulse Ox


 


 98.5 F   74   20   131/75   97 


 


 02/26/17 20:35  02/26/17 21:18  02/26/17 20:35  02/26/17 21:18  02/26/17 20:35











- Medications


Medications: 


 Current Medications





Aspirin (Ecotrin)  81 mg PO DAILY [facility]


   Last Admin: 02/26/17 09:00 Dose:  81 mg


Atorvastatin Calcium (Lipitor)  20 mg PO HS [facility]


   Last Admin: 02/26/17 21:18 Dose:  20 mg


Divalproex Sodium (Depakote Dr(*Bid*))  500 mg PO BID [facility]


   Last Admin: 02/26/17 16:56 Dose:  500 mg


Docusate Sodium (Colace)  100 mg PO BID [facility]


   Last Admin: 02/26/17 16:56 Dose:  100 mg


Enalapril Maleate (Vasotec)  10 mg PO DAILY [facility]


   Last Admin: 02/26/17 09:00 Dose:  10 mg


Famotidine (Pepcid)  20 mg PO BID [facility]


   Last Admin: 02/26/17 16:56 Dose:  20 mg


Ibuprofen (Motrin Tab)  600 mg PO Q8 PRN


   PRN Reason: Pain, moderate (4-7)


   Last Admin: 01/06/17 16:32 Dose:  600 mg


Metoprolol Tartrate (Lopressor)  50 mg PO Q12 [facility]


   Last Admin: 02/26/17 21:18 Dose:  50 mg


Quetiapine Fumarate (Seroquel)  25 mg PO HS [facility]


   Last Admin: 02/26/17 21:18 Dose:  25 mg











- Labs


Labs: 


 





 02/02/17 10:12 





 02/02/17 10:12 





 











PT  11.2 SECONDS (9.4-12.0)   12/14/15  05:20    


 


INR  1.05  (0.89-1.20)   12/14/15  05:20    


 


APTT  36.2 SECONDS (23.1-32.0)  H  12/14/15  05:20    














- Constitutional


Appears: Well, Non-toxic, No Acute Distress





- Head Exam


Head Exam: ATRAUMATIC, NORMAL INSPECTION, NORMOCEPHALIC





- Eye Exam


Eye Exam: EOMI, Normal appearance, PERRL


Pupil Exam: NORMAL ACCOMODATION, PERRL





- ENT Exam


ENT Exam: Mucous Membranes Moist, Normal Exam





- Neck Exam


Neck Exam: Full ROM, Normal Inspection.  absent: Lymphadenopathy





- Respiratory Exam


Respiratory Exam: Clear to Ausculation Bilateral, NORMAL BREATHING PATTERN





- Cardiovascular Exam


Cardiovascular Exam: REGULAR RHYTHM, RRR, +S1, +S2.  absent: Murmur





- GI/Abdominal Exam


GI & Abdominal Exam: Soft, Normal Bowel Sounds.  absent: Tenderness





- Extremities Exam


Extremities Exam: Full ROM, Normal Capillary Refill, Normal Inspection.  absent

: Joint Swelling, Pedal Edema





- Back Exam


Back Exam: NORMAL INSPECTION





- Neurological Exam


Neurological Exam: Alert, Awake, CN II-XII Intact, Normal Gait, Oriented x3





- Psychiatric Exam


Psychiatric exam: Normal Affect, Normal Mood





- Skin


Skin Exam: Dry, Intact, Normal Color, Warm





Assessment and Plan


(1) DVT prophylaxis


Status: Chronic





(2) CAD (coronary artery disease)


Status: Chronic





(3) Smoking


Status: Chronic





(4) Low back pain


Status: Chronic





(5) Scrotal edema


Status: Chronic





(6) Prediabetes


Status: Chronic





(7) Neurocognitive disorder


Status: Chronic








- Assessment and Plan (Free Text)


Assessment: 


(1) DVT prophylaxis


Assessment & Plan: 


scd and ae hose


ambulation


Status: Chronic





(2) CAD (coronary artery disease)


Assessment & Plan: 


cont meds


Status: Chronic





(3) Smoking


Assessment & Plan: 


nicoderm


Status: Chronic





(4) Low back pain


Assessment & Plan: 


ibuprofen prn


Status: Chronic





(5) Scrotal edema


Status: Chronic





(6) Prediabetes


Assessment & Plan: 


dietary


Status: Chronic





(7) Neurocognitive disorder


Assessment & Plan: 


cont med


pendign placement


Status: Chronic

## 2017-02-28 RX ADMIN — DIVALPROEX SODIUM SCH MG: 500 TABLET, DELAYED RELEASE ORAL at 08:59

## 2017-02-28 RX ADMIN — DIVALPROEX SODIUM SCH MG: 500 TABLET, DELAYED RELEASE ORAL at 17:27

## 2017-02-28 NOTE — CP.PCM.PN
Subjective





- Date & Time of Evaluation


Date of Evaluation: 02/28/17


Time of Evaluation: 06:26





- Subjective


Subjective: 


no f/c, n/v/d


pending placement


no complaints/distress





Objective





- Vital Signs/Intake and Output


Vital Signs (last 24 hours): 


 











Temp Pulse Resp BP Pulse Ox


 


 98.8 F   71   20   124/79   99 


 


 02/27/17 19:56  02/27/17 21:18  02/27/17 19:56  02/27/17 21:18  02/27/17 19:56











- Medications


Medications: 


 Current Medications





Aspirin (Ecotrin)  [facility]ILY [facility]


   Last Admin: 02/27/17 08:24 Dose:  81 mg


Atorvastatin Calcium (Lipitor)  20 mg PO HS [facility]


   Last Admin: 02/27/17 21:17 Dose:  20 mg


Divalproex Sodium (Depakote Dr(*Bid*))  500 mg PO BID [facility]


   Last Admin: 02/27/17 17:06 Dose:  500 mg


Docusate Sodium (Colace)  100 mg PO BID [facility]


   Last Admin: 02/27/17 17:07 Dose:  100 mg


Enalapril Maleate (Vasotec)  10 mg PO DAILY [facility]


   Last Admin: 02/27/17 17:07 Dose:  10 mg


Famotidine (Pepcid)  20 mg PO BID [facility]


   Last Admin: 02/27/17 17:07 Dose:  20 mg


Ibuprofen (Motrin Tab)  600 mg PO Q8 PRN


   PRN Reason: Pain, moderate (4-7)


   Last Admin: 01/06/17 16:32 Dose:  600 mg


Metoprolol Tartrate (Lopressor)  50 mg PO Q12 [facility]


   Last Admin: 02/27/17 21:18 Dose:  50 mg


Quetiapine Fumarate (Seroquel)  25 mg PO HS [facility]


   Last Admin: 02/27/17 21:17 Dose:  25 mg











- Labs


Labs: 


 





 02/02/17 10:12 





 02/02/17 10:12 





 











PT  11.2 SECONDS (9.4-12.0)   12/14/15  05:20    


 


INR  1.05  (0.89-1.20)   12/14/15  05:20    


 


APTT  36.2 SECONDS (23.1-32.0)  H  12/14/15  05:20    














- Constitutional


Appears: Well, Non-toxic, No Acute Distress





- Head Exam


Head Exam: ATRAUMATIC, NORMAL INSPECTION, NORMOCEPHALIC





- Eye Exam


Eye Exam: EOMI, Normal appearance, PERRL


Pupil Exam: NORMAL ACCOMODATION, PERRL





- ENT Exam


ENT Exam: Mucous Membranes Moist, Normal Exam





- Neck Exam


Neck Exam: Full ROM, Normal Inspection.  absent: Lymphadenopathy





- Respiratory Exam


Respiratory Exam: Clear to Ausculation Bilateral, NORMAL BREATHING PATTERN





- Cardiovascular Exam


Cardiovascular Exam: REGULAR RHYTHM, +S1, +S2.  absent: Murmur





- GI/Abdominal Exam


GI & Abdominal Exam: Soft, Normal Bowel Sounds.  absent: Tenderness





- Extremities Exam


Extremities Exam: Full ROM, Normal Capillary Refill, Normal Inspection.  absent

: Joint Swelling, Pedal Edema





- Back Exam


Back Exam: NORMAL INSPECTION





- Neurological Exam


Neurological Exam: Alert, Awake, CN II-XII Intact, Normal Gait, Oriented x3





- Psychiatric Exam


Psychiatric exam: Normal Affect, Normal Mood





- Skin


Skin Exam: Dry, Intact, Normal Color, Warm





Assessment and Plan


(1) DVT prophylaxis


Status: Chronic





(2) CAD (coronary artery disease)


Status: Chronic





(3) Smoking


Status: Chronic





(4) Low back pain


Status: Chronic





(5) Scrotal edema


Status: Chronic





(6) Prediabetes


Status: Chronic





(7) Neurocognitive disorder


Status: Chronic








- Assessment and Plan (Free Text)


Assessment: 


(1) DVT prophylaxis


Assessment & Plan: 


scd and ae hose


ambulation


Status: Chronic





(2) CAD (coronary artery disease)


Assessment & Plan: 


cont meds


Status: Chronic





(3) Smoking


Assessment & Plan: 


nicoderm


Status: Chronic





(4) Low back pain


Assessment & Plan: 


ibuprofen prn


Status: Chronic





(5) Scrotal edema


Status: Chronic





(6) Prediabetes


Assessment & Plan: 


dietary


Status: Chronic





(7) Neurocognitive disorder


Assessment & Plan: 


cont med


pendign placement


Status: Chronic

## 2017-03-01 RX ADMIN — DIVALPROEX SODIUM SCH MG: 250 TABLET, DELAYED RELEASE ORAL at 16:05

## 2017-03-01 RX ADMIN — DIVALPROEX SODIUM SCH MG: 500 TABLET, DELAYED RELEASE ORAL at 08:35

## 2017-03-01 NOTE — CP.PCM.PN
Subjective





- Date & Time of Evaluation


Date of Evaluation: 03/01/17


Time of Evaluation: 09:25





- Subjective


Subjective: 


dictated note [id number]


pending palcement


no complaints





Objective





- Vital Signs/Intake and Output


Vital Signs (last 24 hours): 


 











Temp Pulse Resp BP Pulse Ox


 


 98.2 F   70   18   115/63   97 


 


 03/01/17 08:05  03/01/17 08:05  03/01/17 08:05  03/01/17 08:05  03/01/17 08:05











- Medications


Medications: 


 Current Medications





Aspirin (Ecotrin)  81 mg PO DAILY [facility]


   Last Admin: 03/01/17 08:35 Dose:  81 mg


Atorvastatin Calcium (Lipitor)  20 mg PO [facility]


   Last Admin: 02/28/17 21:36 Dose:  20 mg


Divalproex Sodium (Depakote Dr(*Bid*))  500 mg PO BID [facility]


   Last Admin: 03/01/17 08:35 Dose:  500 mg


Docusate Sodium (Colace)  100 mg PO BID [facility]


   Last Admin: 03/01/17 08:35 Dose:  100 mg


Enalapril Maleate (Vasotec)  10 mg PO DAILY [facility]


   Last Admin: 03/01/17 08:36 Dose:  10 mg


Famotidine (Pepcid)  20 mg PO BID [facility]


   Last Admin: 03/01/17 08:36 Dose:  20 mg


Ibuprofen (Motrin Tab)  600 mg PO Q8 PRN


   PRN Reason: Pain, moderate (4-7)


   Last Admin: 01/06/17 16:32 Dose:  600 mg


Metoprolol Tartrate (Lopressor)  50 mg PO Q12 [facility]


   Last Admin: 03/01/17 08:35 Dose:  50 mg


Quetiapine Fumarate (Seroquel)  25 mg PO HS [facility]


   Last Admin: 02/28/17 21:36 Dose:  25 mg











- Labs


Labs: 


 





 02/02/17 10:12 





 02/02/17 10:12 





 











PT  11.2 SECONDS (9.4-12.0)   12/14/15  05:20    


 


INR  1.05  (0.89-1.20)   12/14/15  05:20    


 


APTT  36.2 SECONDS (23.1-32.0)  H  12/14/15  05:20    














Assessment and Plan


(1) DVT prophylaxis


Status: Chronic





(2) CAD (coronary artery disease)


Status: Chronic





(3) Smoking


Status: Chronic





(4) Low back pain


Status: Chronic





(5) Scrotal edema


Status: Chronic





(6) Prediabetes


Status: Chronic





(7) Neurocognitive disorder


Status: Chronic

## 2017-03-01 NOTE — HP
The patient evaluated in bed.  No complaints, no distress.  No fever, chills, nausea, vomiting, diarr
hea.  The patient is pending placement.



REVIEW OF SYSTEMS:  Negative.



PHYSICAL EXAMINATION:

GENERAL:  He is alert and oriented to his baseline.

HEART:  Regular rate and rhythm.  No murmurs, rubs, or gallops.

LUNGS:  Clear in all fields bilaterally.

ABDOMEN:  Soft, nontender.  Bowel sounds x 4.

EXTREMITIES:  Distal PMS is intact.  Cap refill is brisk.



DIAGNOSES AND PLAN:  Will continue all treatment plan.  Continue all medicines.  Continue social serv
ices followup.  We will continue to monitor patient until safe disposition is established.





__________________________________________

Fan SILVER







cc:



DD: 03/01/2017 09:14:09  [id number]

TT: 03/01/2017 09:31:32

Job # [id number]

en

## 2017-03-02 RX ADMIN — DIVALPROEX SODIUM SCH MG: 250 TABLET, DELAYED RELEASE ORAL at 16:08

## 2017-03-02 RX ADMIN — DIVALPROEX SODIUM SCH MG: 250 TABLET, DELAYED RELEASE ORAL at 08:17

## 2017-03-02 NOTE — CP.PCM.PN
Subjective





- Date & Time of Evaluation


Date of Evaluation: 03/02/17


Time of Evaluation: 06:23





- Subjective


Subjective: 


no complaints/distress


no f/c, nv/d/


pending palcement





Objective





- Vital Signs/Intake and Output


Vital Signs (last 24 hours): 


 











Temp Pulse Resp BP Pulse Ox


 


 98.7 F   70   20   120/78   96 


 


 03/01/17 21:24  03/01/17 21:24  03/01/17 21:24  03/01/17 21:24  03/01/17 21:24











- Medications


Medications: 


 Current Medications





Aspirin (Ecotrin)  81 mg PO DAILY [facility]


   Last Admin: 03/01/17 08:35 Dose:  81 mg


Atorvastatin Calcium (Lipitor)  20 mg PO HS [facility]


   Last Admin: 03/01/17 21:12 Dose:  20 mg


Divalproex Sodium (Depakote Dr(*Bid*))  500 mg PO BID [facility]


   Last Admin: 03/01/17 16:05 Dose:  500 mg


Docusate Sodium (Colace)  100 mg PO BID [facility]


   Last Admin: 03/01/17 16:06 Dose:  100 mg


Enalapril Maleate (Vasotec)  10 mg PO DAILY [facility]


   Last Admin: 03/01/17 08:36 Dose:  10 mg


Famotidine (Pepcid)  20 mg PO BID [facility]


   Last Admin: 03/01/17 16:06 Dose:  20 mg


Ibuprofen (Motrin Tab)  600 mg PO Q8 PRN


   PRN Reason: Pain, moderate (4-7)


   Last Admin: 01/06/17 16:32 Dose:  600 mg


Metoprolol Tartrate (Lopressor)  50 mg PO Q12 [facility]


   Last Admin: 03/01/17 21:11 Dose:  50 mg


Quetiapine Fumarate (Seroquel)  25 mg PO HS [facility]


   Last Admin: 03/01/17 21:11 Dose:  25 mg











- Labs


Labs: 


 





 02/02/17 10:12 





 02/02/17 10:12 





 











PT  11.2 SECONDS (9.4-12.0)   12/14/15  05:20    


 


INR  1.05  (0.89-1.20)   12/14/15  05:20    


 


APTT  36.2 SECONDS (23.1-32.0)  H  12/14/15  05:20    














- Constitutional


Appears: Well, Non-toxic, No Acute Distress





- Head Exam


Head Exam: ATRAUMATIC, NORMAL INSPECTION, NORMOCEPHALIC





- Eye Exam


Eye Exam: EOMI, Normal appearance, PERRL


Pupil Exam: NORMAL ACCOMODATION, PERRL





- ENT Exam


ENT Exam: Mucous Membranes Moist, Normal Exam





- Neck Exam


Neck Exam: Full ROM, Normal Inspection.  absent: Lymphadenopathy





- Respiratory Exam


Respiratory Exam: Clear to Ausculation Bilateral, NORMAL BREATHING PATTERN





- Cardiovascular Exam


Cardiovascular Exam: REGULAR RHYTHM, RRR, +S1, +S2.  absent: Murmur





- GI/Abdominal Exam


GI & Abdominal Exam: Soft, Normal Bowel Sounds.  absent: Tenderness





- Extremities Exam


Extremities Exam: Full ROM, Normal Capillary Refill, Normal Inspection.  absent

: Joint Swelling, Pedal Edema





- Back Exam


Back Exam: NORMAL INSPECTION





- Neurological Exam


Neurological Exam: Alert, Awake, CN II-XII Intact, Normal Gait, Oriented x3





- Psychiatric Exam


Psychiatric exam: Normal Affect, Normal Mood





- Skin


Skin Exam: Dry, Intact, Normal Color, Warm





Assessment and Plan


(1) DVT prophylaxis


Status: Chronic





(2) CAD (coronary artery disease)


Status: Chronic





(3) Smoking


Status: Chronic





(4) Low back pain


Status: Chronic





(5) Scrotal edema


Status: Chronic





(6) Prediabetes


Status: Chronic





(7) Neurocognitive disorder


Status: Chronic








- Assessment and Plan (Free Text)


Assessment: 


(1) DVT prophylaxis


Assessment & Plan: 


scd and ae hose


ambulation


Status: Chronic





(2) CAD (coronary artery disease)


Assessment & Plan: 


cont meds


Status: Chronic





(3) Smoking


Assessment & Plan: 


nicoderm


Status: Chronic





(4) Low back pain


Assessment & Plan: 


ibuprofen prn


Status: Chronic





(5) Scrotal edema


Status: Chronic





(6) Prediabetes


Assessment & Plan: 


dietary


Status: Chronic





(7) Neurocognitive disorder


Assessment & Plan: 


cont med


pendign placement


Status: Chronic

## 2017-03-03 RX ADMIN — DIVALPROEX SODIUM SCH MG: 250 TABLET, DELAYED RELEASE ORAL at 16:58

## 2017-03-03 RX ADMIN — DIVALPROEX SODIUM SCH MG: 250 TABLET, DELAYED RELEASE ORAL at 09:44

## 2017-03-03 NOTE — CP.PCM.PN
Subjective





- Date & Time of Evaluation


Date of Evaluation: 03/03/17


Time of Evaluation: 09:50





- Subjective


Subjective: 


no complaints/distress


no f/c, n/v/d


pending placement








Objective





- Vital Signs/Intake and Output


Vital Signs (last 24 hours): 


 











Temp Pulse Resp BP Pulse Ox


 


 98.4 F   66   20   109/70   97 


 


 03/03/17 08:29  03/03/17 09:46  03/03/17 08:29  03/03/17 09:46  03/03/17 08:29











- Medications


Medications: 


 Current Medications





Aspirin (Ecotrin)  81 mg PO DAILY [facility]


   Last Admin: 03/03/17 09:44 Dose:  81 mg


Atorvastatin Calcium (Lipitor)  20 mg PO HS [facility]


   Last Admin: 03/02/17 21:22 Dose:  20 mg


Divalproex Sodium (Depakote Dr(*Bid*))  500 mg PO BID [facility]


   Last Admin: 03/03/17 09:44 Dose:  500 mg


Docusate Sodium (Colace)  100 mg PO BID [facility]


   Last Admin: 03/03/17 09:44 Dose:  100 mg


Enalapril Maleate (Vasotec)  10 mg PO DAILY [facility]


   Last Admin: 03/03/17 09:46 Dose:  10 mg


Famotidine (Pepcid)  20 mg PO BID [facility]


   Last Admin: 03/03/17 09:45 Dose:  20 mg


Ibuprofen (Motrin Tab)  600 mg PO Q8 PRN


   PRN Reason: Pain, moderate (4-7)


   Last Admin: 01/06/17 16:32 Dose:  600 mg


Metoprolol Tartrate (Lopressor)  50 mg PO Q12 [facility]


   Last Admin: 03/03/17 09:46 Dose:  50 mg


Quetiapine Fumarate (Seroquel)  25 mg PO HS [facility]


   Last Admin: 03/02/17 21:28 Dose:  25 mg











- Labs


Labs: 


 





 02/02/17 10:12 





 02/02/17 10:12 





 











PT  11.2 SECONDS (9.4-12.0)   12/14/15  05:20    


 


INR  1.05  (0.89-1.20)   12/14/15  05:20    


 


APTT  36.2 SECONDS (23.1-32.0)  H  12/14/15  05:20    














- Constitutional


Appears: Well, Non-toxic, No Acute Distress





- Head Exam


Head Exam: ATRAUMATIC, NORMAL INSPECTION, NORMOCEPHALIC





- Eye Exam


Eye Exam: EOMI, Normal appearance, PERRL


Pupil Exam: NORMAL ACCOMODATION, PERRL





- ENT Exam


ENT Exam: Mucous Membranes Moist, Normal Exam





- Neck Exam


Neck Exam: Full ROM, Normal Inspection.  absent: Lymphadenopathy





- Respiratory Exam


Respiratory Exam: Clear to Ausculation Bilateral, NORMAL BREATHING PATTERN





- Cardiovascular Exam


Cardiovascular Exam: REGULAR RHYTHM, +S1, +S2.  absent: Murmur





- GI/Abdominal Exam


GI & Abdominal Exam: Soft, Normal Bowel Sounds.  absent: Tenderness





- Extremities Exam


Extremities Exam: Full ROM, Normal Capillary Refill, Normal Inspection.  absent

: Joint Swelling, Pedal Edema





- Back Exam


Back Exam: NORMAL INSPECTION





- Neurological Exam


Neurological Exam: Alert, Awake, CN II-XII Intact, Normal Gait, Oriented x3





- Psychiatric Exam


Psychiatric exam: Normal Affect, Normal Mood





- Skin


Skin Exam: Dry, Intact, Normal Color, Warm





Assessment and Plan


(1) DVT prophylaxis


Status: Chronic





(2) CAD (coronary artery disease)


Status: Chronic





(3) Smoking


Status: Chronic





(4) Low back pain


Status: Chronic





(5) Scrotal edema


Status: Chronic





(6) Prediabetes


Status: Chronic





(7) Neurocognitive disorder


Status: Chronic








- Assessment and Plan (Free Text)


Assessment: 


(1) DVT prophylaxis


Assessment & Plan: 


scd and ae hose


ambulation


Status: Chronic





(2) CAD (coronary artery disease)


Assessment & Plan: 


cont meds


Status: Chronic





(3) Smoking


Assessment & Plan: 


nicoderm


Status: Chronic





(4) Low back pain


Assessment & Plan: 


ibuprofen prn


Status: Chronic





(5) Scrotal edema


Status: Chronic





(6) Prediabetes


Assessment & Plan: 


dietary


Status: Chronic





(7) Neurocognitive disorder


Assessment & Plan: 


cont med


pendign placement


Status: Chronic

## 2017-03-04 RX ADMIN — DIVALPROEX SODIUM SCH MG: 250 TABLET, DELAYED RELEASE ORAL at 18:58

## 2017-03-04 RX ADMIN — DIVALPROEX SODIUM SCH MG: 250 TABLET, DELAYED RELEASE ORAL at 09:24

## 2017-03-04 NOTE — CP.PCM.PN
Subjective





- Date & Time of Evaluation


Date of Evaluation: 03/04/17


Time of Evaluation: 07:28





- Subjective


Subjective: 


no compalints/distress


no f/c, nv//d


penidng placement





Objective





- Vital Signs/Intake and Output


Vital Signs (last 24 hours): 


 











Temp Pulse Resp BP Pulse Ox


 


 98.5 F   78   20   109/77   100 


 


 03/03/17 19:53  03/03/17 22:13  03/03/17 19:53  03/03/17 22:13  03/03/17 19:53











- Medications


Medications: 


 Current Medications





Aspirin (Ecotrin)  81 mg PO DAILY [facility]


   Last Admin: 03/03/17 09:44 Dose:  81 mg


Atorvastatin Calcium (Lipitor)  20 mg PO HS [facility]


   Last Admin: 03/03/17 22:18 Dose:  20 mg


Divalproex Sodium (Depakote Dr(*Bid*))  500 mg PO BID [facility]


   Last Admin: 03/03/17 16:58 Dose:  500 mg


Docusate Sodium (Colace)  100 mg PO BID [facility]


   Last Admin: 03/03/17 16:58 Dose:  100 mg


Enalapril Maleate (Vasotec)  10 mg PO DAILY [facility]


   Last Admin: 03/03/17 09:46 Dose:  10 mg


Famotidine (Pepcid)  20 mg PO BID [facility]


   Last Admin: 03/03/17 16:59 Dose:  20 mg


Ibuprofen (Motrin Tab)  600 mg PO Q8 PRN


   PRN Reason: Pain, moderate (4-7)


   Last Admin: 01/06/17 16:32 Dose:  600 mg


Metoprolol Tartrate (Lopressor)  50 mg PO Q12 [facility]


   Last Admin: 03/03/17 22:13 Dose:  50 mg


Quetiapine Fumarate (Seroquel)  25 mg PO HS [facility]


   Last Admin: 03/03/17 22:18 Dose:  25 mg











- Labs


Labs: 


 





 02/02/17 10:12 





 02/02/17 10:12 





 











PT  11.2 SECONDS (9.4-12.0)   12/14/15  05:20    


 


INR  1.05  (0.89-1.20)   12/14/15  05:20    


 


APTT  36.2 SECONDS (23.1-32.0)  H  12/14/15  05:20    














- Constitutional


Appears: Well, Non-toxic, No Acute Distress





- Head Exam


Head Exam: ATRAUMATIC, NORMAL INSPECTION, NORMOCEPHALIC





- Eye Exam


Eye Exam: EOMI, Normal appearance, PERRL


Pupil Exam: NORMAL ACCOMODATION, PERRL





- ENT Exam


ENT Exam: Mucous Membranes Moist, Normal Exam





- Neck Exam


Neck Exam: Full ROM, Normal Inspection.  absent: Lymphadenopathy





- Respiratory Exam


Respiratory Exam: Clear to Ausculation Bilateral, NORMAL BREATHING PATTERN





- Cardiovascular Exam


Cardiovascular Exam: REGULAR RHYTHM, RRR, +S1, +S2.  absent: Murmur





- GI/Abdominal Exam


GI & Abdominal Exam: Soft, Normal Bowel Sounds.  absent: Tenderness





- Extremities Exam


Extremities Exam: Full ROM, Normal Capillary Refill, Normal Inspection.  absent

: Joint Swelling, Pedal Edema





- Back Exam


Back Exam: NORMAL INSPECTION





- Neurological Exam


Neurological Exam: Alert, Awake, CN II-XII Intact, Normal Gait, Oriented x3





- Psychiatric Exam


Psychiatric exam: Normal Affect, Normal Mood





- Skin


Skin Exam: Dry, Intact, Normal Color, Warm





Assessment and Plan


(1) DVT prophylaxis


Status: Chronic





(2) CAD (coronary artery disease)


Status: Chronic





(3) Smoking


Status: Chronic





(4) Low back pain


Status: Chronic





(5) Scrotal edema


Status: Chronic





(6) Prediabetes


Status: Chronic





(7) Neurocognitive disorder


Status: Chronic








- Assessment and Plan (Free Text)


Assessment: 


cont all meds


cont ss


cont placement

## 2017-03-05 RX ADMIN — DIVALPROEX SODIUM SCH MG: 250 TABLET, DELAYED RELEASE ORAL at 17:10

## 2017-03-05 RX ADMIN — DIVALPROEX SODIUM SCH MG: 250 TABLET, DELAYED RELEASE ORAL at 09:32

## 2017-03-05 NOTE — CP.PCM.PN
Subjective





- Date & Time of Evaluation


Date of Evaluation: 03/05/17


Time of Evaluation: 08:56





- Subjective


Subjective: 


no compalints/distress


nof /c, nv/d/


pending palcement





Objective





- Vital Signs/Intake and Output


Vital Signs (last 24 hours): 


 











Temp Pulse Resp BP Pulse Ox


 


 98.2 F   62   20   102/68   99 


 


 03/05/17 08:11  03/05/17 08:11  03/05/17 08:11  03/05/17 08:11  03/05/17 08:11











- Medications


Medications: 


 Current Medications





Aspirin (Ecotrin)  81 mg PO DAILY [facility]


   Last Admin: 03/04/17 09:25 Dose:  81 mg


Atorvastatin Calcium (Lipitor)  20 mg PO HS [facility]


   Last Admin: 03/04/17 21:06 Dose:  20 mg


Divalproex Sodium (Depakote Dr(*Bid*))  500 mg PO BID [facility]


   Last Admin: 03/04/17 18:58 Dose:  500 mg


Docusate Sodium (Colace)  100 mg PO BID [facility]


   Last Admin: 03/04/17 18:58 Dose:  100 mg


Enalapril Maleate (Vasotec)  10 mg PO DAILY [facility]


   Last Admin: 03/04/17 09:27 Dose:  10 mg


Famotidine (Pepcid)  20 mg PO BID [facility]


   Last Admin: 03/04/17 18:58 Dose:  20 mg


Ibuprofen (Motrin Tab)  600 mg PO Q8 PRN


   PRN Reason: Pain, moderate (4-7)


   Last Admin: 01/06/17 16:32 Dose:  600 mg


Metoprolol Tartrate (Lopressor)  50 mg PO Q12 [facility]


   Last Admin: 03/04/17 21:07 Dose:  50 mg


Quetiapine Fumarate (Seroquel)  25 mg PO HS [facility]


   Last Admin: 03/04/17 21:06 Dose:  25 mg











- Labs


Labs: 


 





 02/02/17 10:12 





 02/02/17 10:12 





 











PT  11.2 SECONDS (9.4-12.0)   12/14/15  05:20    


 


INR  1.05  (0.89-1.20)   12/14/15  05:20    


 


APTT  36.2 SECONDS (23.1-32.0)  H  12/14/15  05:20    














- Constitutional


Appears: Well, Non-toxic, No Acute Distress





- Head Exam


Head Exam: ATRAUMATIC, NORMAL INSPECTION, NORMOCEPHALIC





- Eye Exam


Eye Exam: EOMI, Normal appearance, PERRL


Pupil Exam: NORMAL ACCOMODATION, PERRL





- ENT Exam


ENT Exam: Mucous Membranes Moist, Normal Exam





- Neck Exam


Neck Exam: Full ROM, Normal Inspection.  absent: Lymphadenopathy





- Respiratory Exam


Respiratory Exam: Clear to Ausculation Bilateral, NORMAL BREATHING PATTERN





- Cardiovascular Exam


Cardiovascular Exam: REGULAR RHYTHM, RRR, +S1, +S2.  absent: Murmur





- GI/Abdominal Exam


GI & Abdominal Exam: Soft, Normal Bowel Sounds.  absent: Tenderness





- Extremities Exam


Extremities Exam: Full ROM, Normal Capillary Refill, Normal Inspection.  absent

: Joint Swelling, Pedal Edema





- Back Exam


Back Exam: NORMAL INSPECTION





- Neurological Exam


Neurological Exam: Alert, Awake, CN II-XII Intact, Normal Gait, Oriented x3





- Psychiatric Exam


Psychiatric exam: Normal Affect, Normal Mood





- Skin


Skin Exam: Dry, Intact, Normal Color, Warm





Assessment and Plan


(1) DVT prophylaxis


Status: Chronic





(2) CAD (coronary artery disease)


Status: Chronic





(3) Smoking


Status: Chronic





(4) Low back pain


Status: Chronic





(5) Scrotal edema


Status: Chronic





(6) Prediabetes


Status: Chronic





(7) Neurocognitive disorder


Status: Chronic








- Assessment and Plan (Free Text)


Assessment: 


(1) DVT prophylaxis


Assessment & Plan: 


scd and ae hose


ambulation


Status: Chronic





(2) CAD (coronary artery disease)


Assessment & Plan: 


cont meds


Status: Chronic





(3) Smoking


Assessment & Plan: 


nicoderm


Status: Chronic





(4) Low back pain


Assessment & Plan: 


ibuprofen prn


Status: Chronic





(5) Scrotal edema


Status: Chronic





(6) Prediabetes


Assessment & Plan: 


dietary


Status: Chronic





(7) Neurocognitive disorder


Assessment & Plan: 


cont med


pendign placement


Status: Chronic

## 2017-03-06 RX ADMIN — DIVALPROEX SODIUM SCH MG: 250 TABLET, DELAYED RELEASE ORAL at 16:07

## 2017-03-06 RX ADMIN — DIVALPROEX SODIUM SCH MG: 250 TABLET, DELAYED RELEASE ORAL at 09:09

## 2017-03-06 NOTE — CP.PCM.PN
Subjective





- Date & Time of Evaluation


Date of Evaluation: 03/06/17


Time of Evaluation: 07:12





- Subjective


Subjective: 


no complaints/distress


no f/c, n/v/d


penidng palcement





Objective





- Vital Signs/Intake and Output


Vital Signs (last 24 hours): 


 











Temp Pulse Resp BP Pulse Ox


 


 98.7 F   70   20   124/78   99 


 


 03/05/17 20:52  03/05/17 21:23  03/05/17 20:52  03/05/17 21:23  03/05/17 20:52











- Medications


Medications: 


 Current Medications





Aspirin (Ecotrin)  81 mg PO DAILY [facility]


   Last Admin: 03/05/17 09:32 Dose:  81 mg


Atorvastatin Calcium (Lipitor)  20 mg PO HS [facility]


   Last Admin: 03/05/17 21:25 Dose:  20 mg


Divalproex Sodium (Depakote Dr(*Bid*))  500 mg PO BID [facility]


   Last Admin: 03/05/17 17:10 Dose:  500 mg


Docusate Sodium (Colace)  100 mg PO BID [facility]


   Last Admin: 03/05/17 17:10 Dose:  100 mg


Enalapril Maleate (Vasotec)  10 mg PO DAILY [facility]


   Last Admin: 03/05/17 09:33 Dose:  10 mg


Famotidine (Pepcid)  20 mg PO BID [facility]


   Last Admin: 03/05/17 17:11 Dose:  20 mg


Ibuprofen (Motrin Tab)  600 mg PO Q8 PRN


   PRN Reason: Pain, moderate (4-7)


   Last Admin: 01/06/17 16:32 Dose:  600 mg


Metoprolol Tartrate (Lopressor)  50 mg PO Q12 [facility]


   Last Admin: 03/05/17 21:23 Dose:  50 mg


Quetiapine Fumarate (Seroquel)  25 mg PO HS [facility]


   Last Admin: 03/05/17 21:25 Dose:  25 mg











- Labs


Labs: 


 





 02/02/17 10:12 





 02/02/17 10:12 





 











PT  11.2 SECONDS (9.4-12.0)   12/14/15  05:20    


 


INR  1.05  (0.89-1.20)   12/14/15  05:20    


 


APTT  36.2 SECONDS (23.1-32.0)  H  12/14/15  05:20    














- Constitutional


Appears: Well, Non-toxic, No Acute Distress





- Head Exam


Head Exam: ATRAUMATIC, NORMAL INSPECTION, NORMOCEPHALIC





- Eye Exam


Eye Exam: EOMI, Normal appearance, PERRL


Pupil Exam: NORMAL ACCOMODATION, PERRL





- ENT Exam


ENT Exam: Mucous Membranes Moist, Normal Exam





- Neck Exam


Neck Exam: Full ROM, Normal Inspection.  absent: Lymphadenopathy





- Respiratory Exam


Respiratory Exam: Clear to Ausculation Bilateral, NORMAL BREATHING PATTERN





- Cardiovascular Exam


Cardiovascular Exam: REGULAR RHYTHM, RRR, +S1, +S2.  absent: Murmur





- GI/Abdominal Exam


GI & Abdominal Exam: Soft, Normal Bowel Sounds.  absent: Tenderness





- Extremities Exam


Extremities Exam: Full ROM, Normal Capillary Refill, Normal Inspection.  absent

: Joint Swelling, Pedal Edema





- Back Exam


Back Exam: NORMAL INSPECTION





- Neurological Exam


Neurological Exam: Alert, Awake, CN II-XII Intact, Normal Gait, Oriented x3





- Psychiatric Exam


Psychiatric exam: Normal Affect, Normal Mood





- Skin


Skin Exam: Dry, Intact, Normal Color, Warm





Assessment and Plan


(1) DVT prophylaxis


Status: Chronic





(2) CAD (coronary artery disease)


Status: Chronic





(3) Smoking


Status: Chronic





(4) Low back pain


Status: Chronic





(5) Scrotal edema


Status: Chronic





(6) Prediabetes


Status: Chronic





(7) Neurocognitive disorder


Status: Chronic








- Assessment and Plan (Free Text)


Assessment: 


cont current plan, meds and SS intervention

## 2017-03-07 RX ADMIN — DIVALPROEX SODIUM SCH MG: 500 TABLET, DELAYED RELEASE ORAL at 17:18

## 2017-03-07 RX ADMIN — DIVALPROEX SODIUM SCH MG: 500 TABLET, DELAYED RELEASE ORAL at 09:24

## 2017-03-07 NOTE — CP.PCM.PN
Subjective





- Date & Time of Evaluation


Date of Evaluation: 03/07/17


Time of Evaluation: 07:43





- Subjective


Subjective: 


no complaints/distress


no f/c, n/v/d


pending palcement





Objective





- Vital Signs/Intake and Output


Vital Signs (last 24 hours): 


 











Temp Pulse Resp BP Pulse Ox


 


 98.7 F   76   20   120/84   98 


 


 03/06/17 20:29  03/06/17 21:03  03/06/17 20:29  03/06/17 21:03  03/06/17 20:29











- Medications


Medications: 


 Current Medications





Aspirin (Ecotrin)  81 mg PO DAILY [facility]


   Last Admin: 03/06/17 09:09 Dose:  81 mg


Atorvastatin Calcium (Lipitor)  20 mg PO HS [facility]


   Last Admin: 03/06/17 21:04 Dose:  20 mg


Divalproex Sodium (Depakote Dr(*Bid*))  500 mg PO BID [facility]


Docusate Sodium (Colace)  100 mg PO BID [facility]


   Last Admin: 03/06/17 16:07 Dose:  100 mg


Enalapril Maleate (Vasotec)  10 mg PO DAILY [facility]


   Last Admin: 03/06/17 09:09 Dose:  10 mg


Famotidine (Pepcid)  20 mg PO BID [facility]


   Last Admin: 03/06/17 16:07 Dose:  20 mg


Ibuprofen (Motrin Tab)  600 mg PO Q8 PRN


   PRN Reason: Pain, moderate (4-7)


   Last Admin: 01/06/17 16:32 Dose:  600 mg


Metoprolol Tartrate (Lopressor)  50 mg PO Q12 [facility]


   Last Admin: 03/06/17 21:03 Dose:  50 mg


Quetiapine Fumarate (Seroquel)  25 mg PO HS [facility]


   Last Admin: 03/06/17 21:03 Dose:  25 mg











- Labs


Labs: 


 





 02/02/17 10:12 





 02/02/17 10:12 





 











PT  11.2 SECONDS (9.4-12.0)   12/14/15  05:20    


 


INR  1.05  (0.89-1.20)   12/14/15  05:20    


 


APTT  36.2 SECONDS (23.1-32.0)  H  12/14/15  05:20    














- Constitutional


Appears: Well, Non-toxic, No Acute Distress





- Head Exam


Head Exam: ATRAUMATIC, NORMAL INSPECTION, NORMOCEPHALIC





- Eye Exam


Eye Exam: EOMI, Normal appearance, PERRL


Pupil Exam: NORMAL ACCOMODATION, PERRL





- ENT Exam


ENT Exam: Mucous Membranes Moist, Normal Exam





- Neck Exam


Neck Exam: Full ROM, Normal Inspection.  absent: Lymphadenopathy





- Respiratory Exam


Respiratory Exam: Clear to Ausculation Bilateral, NORMAL BREATHING PATTERN





- Cardiovascular Exam


Cardiovascular Exam: REGULAR RHYTHM, RRR, +S1, +S2.  absent: Murmur





- GI/Abdominal Exam


GI & Abdominal Exam: Soft, Normal Bowel Sounds.  absent: Tenderness





- Extremities Exam


Extremities Exam: Full ROM, Normal Capillary Refill, Normal Inspection.  absent

: Joint Swelling, Pedal Edema





- Back Exam


Back Exam: NORMAL INSPECTION





- Neurological Exam


Neurological Exam: Alert, Awake, CN II-XII Intact, Normal Gait, Oriented x3





- Psychiatric Exam


Psychiatric exam: Normal Affect, Normal Mood





- Skin


Skin Exam: Dry, Intact, Normal Color, Warm





Assessment and Plan


(1) DVT prophylaxis


Status: Chronic





(2) CAD (coronary artery disease)


Status: Chronic





(3) Smoking


Status: Chronic





(4) Low back pain


Status: Chronic





(5) Scrotal edema


Status: Chronic





(6) Prediabetes


Status: Chronic





(7) Neurocognitive disorder


Status: Chronic








- Assessment and Plan (Free Text)


Assessment: 


(1) DVT prophylaxis


Assessment & Plan: 


scd and ae hose


ambulation


Status: Chronic





(2) CAD (coronary artery disease)


Assessment & Plan: 


cont meds


Status: Chronic





(3) Smoking


Assessment & Plan: 


nicoderm


Status: Chronic





(4) Low back pain


Assessment & Plan: 


ibuprofen prn


Status: Chronic





(5) Scrotal edema


Status: Chronic





(6) Prediabetes


Assessment & Plan: 


dietary


Status: Chronic





(7) Neurocognitive disorder


Assessment & Plan: 


cont med


pendign placement


Status: Chronic

## 2017-03-08 RX ADMIN — DIVALPROEX SODIUM SCH MG: 500 TABLET, DELAYED RELEASE ORAL at 08:40

## 2017-03-08 RX ADMIN — DIVALPROEX SODIUM SCH MG: 500 TABLET, DELAYED RELEASE ORAL at 16:47

## 2017-03-08 NOTE — CP.PCM.PN
Subjective





- Date & Time of Evaluation


Date of Evaluation: 03/08/17


Time of Evaluation: 07:30





- Subjective


Subjective: 


no complaints/distress


pending palcement


no f/c, n/v/d





Objective





- Vital Signs/Intake and Output


Vital Signs (last 24 hours): 


 











Temp Pulse Resp BP Pulse Ox


 


 98.7 F   74   20   118/76   98 


 


 03/07/17 20:22  03/07/17 20:31  03/07/17 20:22  03/07/17 20:31  03/07/17 20:22











- Medications


Medications: 


 Current Medications





Aspirin (Ecotrin)  81 mg PO DAILY [facility]


   Last Admin: 03/07/17 09:24 Dose:  81 mg


Atorvastatin Calcium (Lipitor)  20 mg PO HS [facility]


   Last Admin: 03/07/17 21:02 Dose:  20 mg


Divalproex Sodium (Depakote Dr(*Bid*))  500 mg PO BID [facility]


   Last Admin: 03/07/17 17:18 Dose:  500 mg


Docusate Sodium (Colace)  100 mg PO BID [facility]


   Last Admin: 03/07/17 17:18 Dose:  100 mg


Enalapril Maleate (Vasotec)  10 mg PO DAILY [facility]


   Last Admin: 03/07/17 09:24 Dose:  10 mg


Famotidine (Pepcid)  20 mg PO BID [facility]


   Last Admin: 03/07/17 17:18 Dose:  20 mg


Ibuprofen (Motrin Tab)  600 mg PO Q8 PRN


   PRN Reason: Pain, moderate (4-7)


   Last Admin: 01/06/17 16:32 Dose:  600 mg


Metoprolol Tartrate (Lopressor)  50 mg PO Q12 [facility]


   Last Admin: 03/07/17 20:31 Dose:  50 mg


Quetiapine Fumarate (Seroquel)  25 mg PO HS [facility]


   Last Admin: 03/07/17 21:02 Dose:  25 mg











- Labs


Labs: 


 





 02/02/17 10:12 





 02/02/17 10:12 





 











PT  11.2 SECONDS (9.4-12.0)   12/14/15  05:20    


 


INR  1.05  (0.89-1.20)   12/14/15  05:20    


 


APTT  36.2 SECONDS (23.1-32.0)  H  12/14/15  05:20    














- Constitutional


Appears: Well, Non-toxic, No Acute Distress





- Head Exam


Head Exam: ATRAUMATIC, NORMAL INSPECTION, NORMOCEPHALIC





- Eye Exam


Eye Exam: EOMI, Normal appearance, PERRL


Pupil Exam: NORMAL ACCOMODATION, PERRL





- ENT Exam


ENT Exam: Mucous Membranes Moist, Normal Exam





- Neck Exam


Neck Exam: Full ROM, Normal Inspection.  absent: Lymphadenopathy





- Respiratory Exam


Respiratory Exam: Clear to Ausculation Bilateral, NORMAL BREATHING PATTERN





- Cardiovascular Exam


Cardiovascular Exam: REGULAR RHYTHM, RRR, +S1, +S2.  absent: Murmur





- GI/Abdominal Exam


GI & Abdominal Exam: Soft, Normal Bowel Sounds.  absent: Tenderness





- Extremities Exam


Extremities Exam: Full ROM, Normal Capillary Refill, Normal Inspection.  absent

: Joint Swelling, Pedal Edema





- Back Exam


Back Exam: NORMAL INSPECTION





- Neurological Exam


Neurological Exam: Alert, Awake, CN II-XII Intact, Normal Gait, Oriented x3





- Psychiatric Exam


Psychiatric exam: Normal Affect, Normal Mood





- Skin


Skin Exam: Dry, Intact, Normal Color, Warm





Assessment and Plan


(1) DVT prophylaxis


Status: Chronic





(2) CAD (coronary artery disease)


Status: Chronic





(3) Smoking


Status: Chronic





(4) Low back pain


Status: Chronic





(5) Scrotal edema


Status: Chronic





(6) Prediabetes


Status: Chronic





(7) Neurocognitive disorder


Status: Chronic








- Assessment and Plan (Free Text)


Assessment: 


(1) DVT prophylaxis


Assessment & Plan: 


scd and ae hose


ambulation


Status: Chronic





(2) CAD (coronary artery disease)


Assessment & Plan: 


cont meds


Status: Chronic





(3) Smoking


Assessment & Plan: 


nicoderm


Status: Chronic





(4) Low back pain


Assessment & Plan: 


ibuprofen prn


Status: Chronic





(5) Scrotal edema


Status: Chronic





(6) Prediabetes


Assessment & Plan: 


dietary


Status: Chronic





(7) Neurocognitive disorder


Assessment & Plan: 


cont med


pendign placement


Status: Chronic

## 2017-03-09 RX ADMIN — DIVALPROEX SODIUM SCH MG: 500 TABLET, DELAYED RELEASE ORAL at 08:35

## 2017-03-09 RX ADMIN — DIVALPROEX SODIUM SCH MG: 500 TABLET, DELAYED RELEASE ORAL at 15:59

## 2017-03-09 NOTE — CP.PCM.PN
Subjective





- Date & Time of Evaluation


Date of Evaluation: 03/09/17


Time of Evaluation: 07:18





- Subjective


Subjective: 


NO COMPLAINTS/DISTRESS


NO F/C, N/V/D


PENDIN GPLACEMENT





Objective





- Vital Signs/Intake and Output


Vital Signs (last 24 hours): 


 











Temp Pulse Resp BP Pulse Ox


 


 98.5 F   63   20   122/77   97 


 


 03/08/17 19:55  03/08/17 21:34  03/08/17 19:55  03/08/17 21:34  03/08/17 19:55











- Medications


Medications: 


 Current Medications





Aspirin (Ecotrin)  81 mg PO DAILY [facility]


   Last Admin: 03/08/17 08:40 Dose:  81 mg


Atorvastatin Calcium (Lipitor)  20 mg PO HS [facility]


   Last Admin: 03/08/17 21:34 Dose:  20 mg


Divalproex Sodium (Depakote Dr(*Bid*))  500 mg PO BID [facility]


   Last Admin: 03/08/17 16:47 Dose:  500 mg


Docusate Sodium (Colace)  100 mg PO BID [facility]


   Last Admin: 03/08/17 16:46 Dose:  100 mg


Enalapril Maleate (Vasotec)  10 mg PO DAILY [facility]


   Last Admin: 03/08/17 08:41 Dose:  10 mg


Famotidine (Pepcid)  20 mg PO BID [facility]


   Last Admin: 03/08/17 16:46 Dose:  20 mg


Ibuprofen (Motrin Tab)  600 mg PO Q8 PRN


   PRN Reason: Pain, moderate (4-7)


   Last Admin: 01/06/17 16:32 Dose:  600 mg


Metoprolol Tartrate (Lopressor)  50 mg PO Q12 [facility]


   Last Admin: 03/08/17 21:34 Dose:  50 mg


Quetiapine Fumarate (Seroquel)  25 mg PO HS [facility]


   Last Admin: 03/08/17 21:35 Dose:  25 mg











- Labs


Labs: 


 





 02/02/17 10:12 





 02/02/17 10:12 





 











PT  11.2 SECONDS (9.4-12.0)   12/14/15  05:20    


 


INR  1.05  (0.89-1.20)   12/14/15  05:20    


 


APTT  36.2 SECONDS (23.1-32.0)  H  12/14/15  05:20    














- Constitutional


Appears: Well, Non-toxic, No Acute Distress





- Head Exam


Head Exam: ATRAUMATIC, NORMAL INSPECTION, NORMOCEPHALIC





- Eye Exam


Eye Exam: EOMI, Normal appearance, PERRL


Pupil Exam: NORMAL ACCOMODATION, PERRL





- ENT Exam


ENT Exam: Mucous Membranes Moist, Normal Exam





- Neck Exam


Neck Exam: Full ROM, Normal Inspection.  absent: Lymphadenopathy





- Respiratory Exam


Respiratory Exam: Clear to Ausculation Bilateral, NORMAL BREATHING PATTERN





- Cardiovascular Exam


Cardiovascular Exam: REGULAR RHYTHM, RRR, +S1, +S2.  absent: Murmur





- GI/Abdominal Exam


GI & Abdominal Exam: Soft, Normal Bowel Sounds.  absent: Tenderness





- Extremities Exam


Extremities Exam: Full ROM, Normal Capillary Refill, Normal Inspection.  absent

: Joint Swelling, Pedal Edema





- Back Exam


Back Exam: NORMAL INSPECTION





- Neurological Exam


Neurological Exam: Alert, Awake, CN II-XII Intact, Normal Gait, Oriented x3





- Psychiatric Exam


Psychiatric exam: Normal Affect, Normal Mood





- Skin


Skin Exam: Dry, Intact, Normal Color, Warm





Assessment and Plan


(1) DVT prophylaxis


Status: Chronic





(2) CAD (coronary artery disease)


Status: Chronic





(3) Smoking


Status: Chronic





(4) Low back pain


Status: Chronic





(5) Scrotal edema


Status: Chronic





(6) Prediabetes


Status: Chronic





(7) Neurocognitive disorder


Status: Chronic








- Assessment and Plan (Free Text)


Assessment: 


CONT PLAN

## 2017-03-10 RX ADMIN — DIVALPROEX SODIUM SCH MG: 500 TABLET, DELAYED RELEASE ORAL at 17:26

## 2017-03-10 RX ADMIN — DIVALPROEX SODIUM SCH MG: 500 TABLET, DELAYED RELEASE ORAL at 08:21

## 2017-03-10 NOTE — CP.PCM.PN
Subjective





- Date & Time of Evaluation


Date of Evaluation: 03/10/17


Time of Evaluation: 09:59





- Subjective


Subjective: 


no complaints/distress


no f/c, n/v/d


pending placement





Objective





- Vital Signs/Intake and Output


Vital Signs (last 24 hours): 


 











Temp Pulse Resp BP Pulse Ox


 


 97.8 F   72   20   104/69   94 L


 


 03/10/17 07:33  03/10/17 08:21  03/10/17 07:33  03/10/17 08:21  03/10/17 07:33











- Medications


Medications: 


 Current Medications





Aspirin (Ecotrin)  81 mg PO DAILY [facility]


   Last Admin: 03/10/17 08:21 Dose:  81 mg


Atorvastatin Calcium (Lipitor)  20 mg PO HS [facility]


   Last Admin: 03/09/17 21:36 Dose:  20 mg


Divalproex Sodium (Depakote Dr(*Bid*))  500 mg PO BID [facility]


   Last Admin: 03/10/17 08:21 Dose:  500 mg


Docusate Sodium (Colace)  100 mg PO BID [facility]


   Last Admin: 03/10/17 08:20 Dose:  100 mg


Enalapril Maleate (Vasotec)  10 mg PO DAILY [facility]


   Last Admin: 03/10/17 08:22 Dose:  10 mg


Famotidine (Pepcid)  20 mg PO BID [facility]


   Last Admin: 03/10/17 08:22 Dose:  20 mg


Ibuprofen (Motrin Tab)  600 mg PO Q8 PRN


   PRN Reason: Pain, moderate (4-7)


   Last Admin: 01/06/17 16:32 Dose:  600 mg


Metoprolol Tartrate (Lopressor)  50 mg PO Q12 [facility]


   Last Admin: 03/10/17 08:21 Dose:  50 mg


Quetiapine Fumarate (Seroquel)  25 mg PO HS [facility]


   Last Admin: 03/09/17 21:36 Dose:  25 mg











- Labs


Labs: 


 





 02/02/17 10:12 





 02/02/17 10:12 





 











PT  11.2 SECONDS (9.4-12.0)   12/14/15  05:20    


 


INR  1.05  (0.89-1.20)   12/14/15  05:20    


 


APTT  36.2 SECONDS (23.1-32.0)  H  12/14/15  05:20    














- Constitutional


Appears: Well, Non-toxic, No Acute Distress





- Head Exam


Head Exam: ATRAUMATIC, NORMAL INSPECTION, NORMOCEPHALIC





- Eye Exam


Eye Exam: EOMI, Normal appearance, PERRL


Pupil Exam: NORMAL ACCOMODATION, PERRL





- ENT Exam


ENT Exam: Mucous Membranes Moist, Normal Exam





- Neck Exam


Neck Exam: Full ROM, Normal Inspection.  absent: Lymphadenopathy





- Respiratory Exam


Respiratory Exam: Clear to Ausculation Bilateral, NORMAL BREATHING PATTERN





- Cardiovascular Exam


Cardiovascular Exam: REGULAR RHYTHM, RRR, +S1, +S2.  absent: Murmur





- GI/Abdominal Exam


GI & Abdominal Exam: Soft, Normal Bowel Sounds.  absent: Tenderness





- Extremities Exam


Extremities Exam: Full ROM, Normal Capillary Refill, Normal Inspection.  absent

: Joint Swelling, Pedal Edema





- Back Exam


Back Exam: NORMAL INSPECTION





- Neurological Exam


Neurological Exam: Alert, Awake, CN II-XII Intact, Normal Gait, Oriented x3





- Psychiatric Exam


Psychiatric exam: Normal Affect, Normal Mood





- Skin


Skin Exam: Dry, Intact, Normal Color, Warm





Assessment and Plan


(1) DVT prophylaxis


Status: Chronic





(2) CAD (coronary artery disease)


Status: Chronic





(3) Smoking


Status: Chronic





(4) Low back pain


Status: Chronic





(5) Scrotal edema


Status: Chronic





(6) Prediabetes


Status: Chronic





(7) Neurocognitive disorder


Status: Chronic








- Assessment and Plan (Free Text)


Assessment: 


cont plan


cont ss

## 2017-03-11 RX ADMIN — DIVALPROEX SODIUM SCH MG: 500 TABLET, DELAYED RELEASE ORAL at 09:22

## 2017-03-11 RX ADMIN — DIVALPROEX SODIUM SCH MG: 500 TABLET, DELAYED RELEASE ORAL at 16:05

## 2017-03-11 NOTE — CP.PCM.PN
Subjective





- Date & Time of Evaluation


Date of Evaluation: 03/11/17


Time of Evaluation: 09:36





- Subjective


Subjective: 


no complaints/distress


no f/c, n/v/d


pending placement





Objective





- Vital Signs/Intake and Output


Vital Signs (last 24 hours): 


 











Temp Pulse Resp BP Pulse Ox


 


 97.8 F   65   20   118/80   98 


 


 03/11/17 07:40  03/11/17 07:40  03/11/17 07:40  03/11/17 07:40  03/11/17 07:40











- Medications


Medications: 


 Current Medications





Aspirin (Ecotrin)  81 mg PO DAILY [facility]


   Last Admin: 03/11/17 09:22 Dose:  81 mg


Atorvastatin Calcium (Lipitor)  20 mg PO HS [facility]


   Last Admin: 03/10/17 22:02 Dose:  20 mg


Divalproex Sodium (Depakote Dr(*Bid*))  500 mg PO BID [facility]


   Last Admin: 03/11/17 09:22 Dose:  500 mg


Docusate Sodium (Colace)  100 mg PO BID [facility]


   Last Admin: 03/11/17 09:22 Dose:  100 mg


Enalapril Maleate (Vasotec)  10 mg PO DAILY [facility]


   Last Admin: 03/11/17 09:23 Dose:  10 mg


Famotidine (Pepcid)  20 mg PO BID [facility]


   Last Admin: 03/11/17 09:23 Dose:  20 mg


Ibuprofen (Motrin Tab)  600 mg PO Q8 PRN


   PRN Reason: Pain, moderate (4-7)


   Last Admin: 01/06/17 16:32 Dose:  600 mg


Metoprolol Tartrate (Lopressor)  50 mg PO Q12 [facility]


   Last Admin: 03/11/17 09:23 Dose:  50 mg


Quetiapine Fumarate (Seroquel)  25 mg PO HS [facility]


   Last Admin: 03/10/17 22:02 Dose:  25 mg











- Labs


Labs: 


 





 02/02/17 10:12 





 02/02/17 10:12 





 











PT  11.2 SECONDS (9.4-12.0)   12/14/15  05:20    


 


INR  1.05  (0.89-1.20)   12/14/15  05:20    


 


APTT  36.2 SECONDS (23.1-32.0)  H  12/14/15  05:20    














- Constitutional


Appears: Well, Non-toxic, No Acute Distress





- Head Exam


Head Exam: ATRAUMATIC, NORMAL INSPECTION, NORMOCEPHALIC





- Eye Exam


Eye Exam: EOMI, Normal appearance, PERRL


Pupil Exam: NORMAL ACCOMODATION, PERRL





- ENT Exam


ENT Exam: Mucous Membranes Moist, Normal Exam





- Neck Exam


Neck Exam: Full ROM, Normal Inspection.  absent: Lymphadenopathy





- Respiratory Exam


Respiratory Exam: Clear to Ausculation Bilateral, NORMAL BREATHING PATTERN





- Cardiovascular Exam


Cardiovascular Exam: REGULAR RHYTHM, RRR, +S1, +S2.  absent: Murmur





- GI/Abdominal Exam


GI & Abdominal Exam: Soft, Normal Bowel Sounds.  absent: Tenderness





- Extremities Exam


Extremities Exam: Full ROM, Normal Capillary Refill, Normal Inspection.  absent

: Joint Swelling, Pedal Edema





- Back Exam


Back Exam: NORMAL INSPECTION





- Neurological Exam


Neurological Exam: Alert, Awake, CN II-XII Intact, Normal Gait, Oriented x3





- Psychiatric Exam


Psychiatric exam: Normal Affect, Normal Mood





- Skin


Skin Exam: Dry, Intact, Normal Color, Warm





Assessment and Plan


(1) DVT prophylaxis


Status: Chronic





(2) CAD (coronary artery disease)


Status: Chronic





(3) Smoking


Status: Chronic





(4) Low back pain


Status: Chronic





(5) Scrotal edema


Status: Chronic





(6) Prediabetes


Status: Chronic





(7) Neurocognitive disorder


Status: Chronic








- Assessment and Plan (Free Text)


Assessment: 


cont meds


cont ss

## 2017-03-12 RX ADMIN — DIVALPROEX SODIUM SCH MG: 500 TABLET, DELAYED RELEASE ORAL at 16:29

## 2017-03-12 RX ADMIN — DIVALPROEX SODIUM SCH MG: 500 TABLET, DELAYED RELEASE ORAL at 08:24

## 2017-03-12 NOTE — CP.PCM.PN
Subjective





- Date & Time of Evaluation


Date of Evaluation: 03/12/17


Time of Evaluation: 09:28





- Subjective


Subjective: 


no complaints/distress


no f/c, n/v/d


pending placement





Objective





- Vital Signs/Intake and Output


Vital Signs (last 24 hours): 


 











Temp Pulse Resp BP Pulse Ox


 


 97.8 F   65   20   114/75   98 


 


 03/12/17 08:14  03/12/17 08:14  03/12/17 08:14  03/12/17 08:14  03/12/17 08:14











- Medications


Medications: 


 Current Medications





Aspirin (Ecotrin)  81 mg PO DAILY [facility]


   Last Admin: 03/12/17 08:25 Dose:  81 mg


Atorvastatin Calcium (Lipitor)  20 mg PO HS [facility]


   Last Admin: 03/11/17 21:34 Dose:  20 mg


Divalproex Sodium (Depakote Dr(*Bid*))  500 mg PO BID [facility]


   Last Admin: 03/12/17 08:24 Dose:  500 mg


Docusate Sodium (Colace)  100 mg PO BID [facility]


   Last Admin: 03/12/17 08:24 Dose:  100 mg


Enalapril Maleate (Vasotec)  10 mg PO DAILY [facility]


   Last Admin: 03/12/17 08:25 Dose:  10 mg


Famotidine (Pepcid)  20 mg PO BID [facility]


   Last Admin: 03/12/17 08:25 Dose:  20 mg


Ibuprofen (Motrin Tab)  600 mg PO Q8 PRN


   PRN Reason: Pain, moderate (4-7)


   Last Admin: 01/06/17 16:32 Dose:  600 mg


Metoprolol Tartrate (Lopressor)  50 mg PO Q12 [facility]


   Last Admin: 03/12/17 08:25 Dose:  50 mg


Quetiapine Fumarate (Seroquel)  25 mg PO HS [facility]


   Last Admin: 03/11/17 21:34 Dose:  25 mg











- Labs


Labs: 


 





 02/02/17 10:12 





 02/02/17 10:12 





 











PT  11.2 SECONDS (9.4-12.0)   12/14/15  05:20    


 


INR  1.05  (0.89-1.20)   12/14/15  05:20    


 


APTT  36.2 SECONDS (23.1-32.0)  H  12/14/15  05:20    














- Constitutional


Appears: Well, Non-toxic, No Acute Distress





- Head Exam


Head Exam: ATRAUMATIC, NORMAL INSPECTION, NORMOCEPHALIC





- Eye Exam


Eye Exam: EOMI, Normal appearance, PERRL


Pupil Exam: NORMAL ACCOMODATION, PERRL





- ENT Exam


ENT Exam: Mucous Membranes Moist, Normal Exam





- Neck Exam


Neck Exam: Full ROM, Normal Inspection.  absent: Lymphadenopathy





- Respiratory Exam


Respiratory Exam: Clear to Ausculation Bilateral, NORMAL BREATHING PATTERN





- Cardiovascular Exam


Cardiovascular Exam: REGULAR RHYTHM, RRR, +S1, +S2.  absent: Murmur





- GI/Abdominal Exam


GI & Abdominal Exam: Soft, Normal Bowel Sounds.  absent: Tenderness





- Extremities Exam


Extremities Exam: Full ROM, Normal Capillary Refill, Normal Inspection.  absent

: Joint Swelling, Pedal Edema





- Back Exam


Back Exam: NORMAL INSPECTION





- Neurological Exam


Neurological Exam: Alert, Awake, CN II-XII Intact, Normal Gait, Oriented x3





- Psychiatric Exam


Psychiatric exam: Normal Affect, Normal Mood





- Skin


Skin Exam: Dry, Intact, Normal Color, Warm





Assessment and Plan


(1) DVT prophylaxis


Status: Chronic





(2) CAD (coronary artery disease)


Status: Chronic





(3) Smoking


Status: Chronic





(4) Low back pain


Status: Chronic





(5) Scrotal edema


Status: Chronic





(6) Prediabetes


Status: Chronic





(7) Neurocognitive disorder


Status: Chronic








- Assessment and Plan (Free Text)


Assessment: 


cont plan, cont ss intervention

## 2017-03-13 RX ADMIN — DIVALPROEX SODIUM SCH MG: 500 TABLET, DELAYED RELEASE ORAL at 16:40

## 2017-03-13 RX ADMIN — DIVALPROEX SODIUM SCH MG: 500 TABLET, DELAYED RELEASE ORAL at 09:25

## 2017-03-13 NOTE — CP.PCM.PN
Subjective





- Date & Time of Evaluation


Date of Evaluation: 03/13/17


Time of Evaluation: 07:20





- Subjective


Subjective: 


no complaints/distress


no f/c, n/v/d


pending palcement





Objective





- Vital Signs/Intake and Output


Vital Signs (last 24 hours): 


 











Temp Pulse Resp BP Pulse Ox


 


 98.5 F   71   17   121/76   99 


 


 03/12/17 21:00  03/12/17 21:00  03/12/17 21:00  03/12/17 21:00  03/12/17 21:00











- Medications


Medications: 


 Current Medications





Aspirin (Ecotrin)  81 mg PO DAILY [facility]


   Last Admin: 03/12/17 08:25 Dose:  81 mg


Atorvastatin Calcium (Lipitor)  20 mg PO HS [facility]


   Last Admin: 03/12/17 21:33 Dose:  20 mg


Divalproex Sodium (Depakote Dr(*Bid*))  500 mg PO BID [facility]


   Last Admin: 03/12/17 16:29 Dose:  500 mg


Docusate Sodium (Colace)  100 mg PO BID [facility]


   Last Admin: 03/12/17 16:21 Dose:  100 mg


Enalapril Maleate (Vasotec)  10 mg PO DAILY [facility]


   Last Admin: 03/12/17 08:25 Dose:  10 mg


Famotidine (Pepcid)  20 mg PO BID [facility]


   Last Admin: 03/12/17 16:22 Dose:  20 mg


Ibuprofen (Motrin Tab)  600 mg PO Q8 PRN


   PRN Reason: Pain, moderate (4-7)


   Last Admin: 01/06/17 16:32 Dose:  600 mg


Metoprolol Tartrate (Lopressor)  50 mg PO Q12 [facility]


   Last Admin: 03/12/17 20:47 Dose:  50 mg


Quetiapine Fumarate (Seroquel)  25 mg PO HS [facility]


   Last Admin: 03/12/17 21:33 Dose:  25 mg











- Labs


Labs: 


 





 02/02/17 10:12 





 02/02/17 10:12 





 











PT  11.2 SECONDS (9.4-12.0)   12/14/15  05:20    


 


INR  1.05  (0.89-1.20)   12/14/15  05:20    


 


APTT  36.2 SECONDS (23.1-32.0)  H  12/14/15  05:20    














- Constitutional


Appears: Well, Non-toxic, No Acute Distress





- Head Exam


Head Exam: ATRAUMATIC, NORMAL INSPECTION, NORMOCEPHALIC





- Eye Exam


Eye Exam: EOMI, Normal appearance, PERRL


Pupil Exam: NORMAL ACCOMODATION, PERRL





- ENT Exam


ENT Exam: Mucous Membranes Moist, Normal Exam





- Neck Exam


Neck Exam: Full ROM, Normal Inspection.  absent: Lymphadenopathy





- Respiratory Exam


Respiratory Exam: Clear to Ausculation Bilateral, NORMAL BREATHING PATTERN





- Cardiovascular Exam


Cardiovascular Exam: REGULAR RHYTHM, RRR, +S1, +S2.  absent: Murmur





- GI/Abdominal Exam


GI & Abdominal Exam: Soft, Normal Bowel Sounds.  absent: Tenderness





- Extremities Exam


Extremities Exam: Full ROM, Normal Capillary Refill, Normal Inspection.  absent

: Joint Swelling, Pedal Edema





- Back Exam


Back Exam: NORMAL INSPECTION





- Neurological Exam


Neurological Exam: Alert, Awake, CN II-XII Intact, Normal Gait, Oriented x3





- Psychiatric Exam


Psychiatric exam: Normal Affect, Normal Mood





- Skin


Skin Exam: Dry, Intact, Normal Color, Warm





Assessment and Plan


(1) DVT prophylaxis


Status: Chronic





(2) CAD (coronary artery disease)


Status: Chronic





(3) Smoking


Status: Chronic





(4) Low back pain


Status: Chronic





(5) Scrotal edema


Status: Chronic





(6) Prediabetes


Status: Chronic





(7) Neurocognitive disorder


Status: Chronic








- Assessment and Plan (Free Text)


Assessment: 


cont plan, cont ss, cont safety

## 2017-03-14 RX ADMIN — DIVALPROEX SODIUM SCH MG: 500 TABLET, DELAYED RELEASE ORAL at 17:30

## 2017-03-14 RX ADMIN — DIVALPROEX SODIUM SCH MG: 500 TABLET, DELAYED RELEASE ORAL at 08:54

## 2017-03-14 NOTE — CP.PCM.PN
Subjective





- Date & Time of Evaluation


Date of Evaluation: 03/14/17


Time of Evaluation: 08:53





- Subjective


Subjective: 


no distress


pendingplacement





Objective





- Vital Signs/Intake and Output


Vital Signs (last 24 hours): 


 











Temp Pulse Resp BP Pulse Ox


 


 98.3 F   70   20   103/69   96 


 


 03/14/17 07:56  03/14/17 07:56  03/14/17 07:56  03/14/17 07:56  03/14/17 07:56











- Medications


Medications: 


 Current Medications





Aspirin (Ecotrin)  81 mg PO DAILY [facility]


   Last Admin: 03/13/17 09:25 Dose:  81 mg


Atorvastatin Calcium (Lipitor)  20 mg PO HS [facility]


   Last Admin: 03/13/17 21:55 Dose:  20 mg


Divalproex Sodium (Depakote Dr(*Bid*))  500 mg PO BID [facility]


   Last Admin: 03/13/17 16:40 Dose:  500 mg


Docusate Sodium (Colace)  100 mg PO BID [facility]


   Last Admin: 03/13/17 16:40 Dose:  100 mg


Enalapril Maleate (Vasotec)  10 mg PO DAILY [facility]


   Last Admin: 03/13/17 09:26 Dose:  10 mg


Famotidine (Pepcid)  20 mg PO BID [facility]


   Last Admin: 03/13/17 16:40 Dose:  20 mg


Ibuprofen (Motrin Tab)  600 mg PO Q8 PRN


   PRN Reason: Pain, moderate (4-7)


   Last Admin: 01/06/17 16:32 Dose:  600 mg


Metoprolol Tartrate (Lopressor)  50 mg PO Q12 [facility]


   Last Admin: 03/13/17 21:55 Dose:  50 mg


Quetiapine Fumarate (Seroquel)  25 mg PO HS [facility]


   Last Admin: 03/13/17 21:55 Dose:  25 mg











- Labs


Labs: 


 





 02/02/17 10:12 





 02/02/17 10:12 





 











PT  11.2 SECONDS (9.4-12.0)   12/14/15  05:20    


 


INR  1.05  (0.89-1.20)   12/14/15  05:20    


 


APTT  36.2 SECONDS (23.1-32.0)  H  12/14/15  05:20    














Assessment and Plan


(1) DVT prophylaxis


Status: Chronic





(2) CAD (coronary artery disease)


Status: Chronic





(3) Smoking


Status: Chronic





(4) Low back pain


Status: Chronic





(5) Scrotal edema


Status: Chronic





(6) Prediabetes


Status: Chronic





(7) Neurocognitive disorder


Status: Chronic

## 2017-03-14 NOTE — PN
DATE: 03/14/2017



The patient evaluated in bed.  No complaints, no distress.  No fever, chills, 
nausea, vomiting, diarrhea. dispo is pending.  .  Disposition.  



REVIEW OF SYSTEMS:  Negative.



PHYSICAL EXAMINATION:

GENERAL:  Alert and oriented.

HEART:  Regular rate and rhythm.  No murmurs, rubs, or gallops.

LUNGS:  Clear in all fields bilaterally.

ABDOMEN:  Soft, nontender.  Bowel sounds x 4.

EXTREMITIES:  Distal PMS is intact.



DIAGNOSES AND PLAN:  Continue all meds, treatment.  , 
intervention for diagnoses of coronary artery disease, and neurocognitive 
disorder, deep venous thrombosis prophylaxis, chronic pain.  



We will continue to monitor closely.





__________________________________________

Fan SILVER







cc:   



DD: 03/14/2017 10:19:59  [id number]

TT: 03/14/2017 10:50:54

Confirmation # [id number]

Dictation # [id number]

jn

MTDD

## 2017-03-15 RX ADMIN — DIVALPROEX SODIUM SCH MG: 500 TABLET, DELAYED RELEASE ORAL at 16:14

## 2017-03-15 RX ADMIN — DIVALPROEX SODIUM SCH MG: 500 TABLET, DELAYED RELEASE ORAL at 08:44

## 2017-03-15 NOTE — CP.PCM.PN
Subjective





- Date & Time of Evaluation


Date of Evaluation: 03/15/17


Time of Evaluation: 08:20





- Subjective


Subjective: 


no comlaints/distress


no f/c, n/v/d


penidngplacement





Objective





- Vital Signs/Intake and Output


Vital Signs (last 24 hours): 


 











Temp Pulse Resp BP Pulse Ox


 


 98.7 F   84   19   107/72   97 


 


 03/14/17 21:26  03/14/17 22:13  03/14/17 21:26  03/14/17 22:13  03/14/17 21:26











- Medications


Medications: 


 Current Medications





Aspirin (Ecotrin)  81 mg PO DAILY [facility]


   Last Admin: 03/14/17 08:54 Dose:  81 mg


Atorvastatin Calcium (Lipitor)  20 mg PO HS [facility]


   Last Admin: 03/14/17 22:13 Dose:  20 mg


Divalproex Sodium (Depakote Dr(*Bid*))  500 mg PO BID [facility]


   Last Admin: 03/14/17 17:30 Dose:  500 mg


Docusate Sodium (Colace)  100 mg PO BID [facility]


   Last Admin: 03/14/17 17:30 Dose:  100 mg


Enalapril Maleate (Vasotec)  10 mg PO DAILY [facility]


   Last Admin: 03/14/17 08:55 Dose:  10 mg


Famotidine (Pepcid)  20 mg PO BID [facility]


   Last Admin: 03/14/17 17:30 Dose:  20 mg


Ibuprofen (Motrin Tab)  600 mg PO Q8 PRN


   PRN Reason: Pain, moderate (4-7)


   Last Admin: 01/06/17 16:32 Dose:  600 mg


Metoprolol Tartrate (Lopressor)  50 mg PO Q12 [facility]


   Last Admin: 03/14/17 22:13 Dose:  50 mg


Quetiapine Fumarate (Seroquel)  25 mg PO HS [facility]


   Last Admin: 03/14/17 22:13 Dose:  25 mg











- Labs


Labs: 


 





 02/02/17 10:12 





 02/02/17 10:12 





 











PT  11.2 SECONDS (9.4-12.0)   12/14/15  05:20    


 


INR  1.05  (0.89-1.20)   12/14/15  05:20    


 


APTT  36.2 SECONDS (23.1-32.0)  H  12/14/15  05:20    














- Constitutional


Appears: Well, Non-toxic, No Acute Distress





- Head Exam


Head Exam: ATRAUMATIC, NORMAL INSPECTION, NORMOCEPHALIC





- Eye Exam


Eye Exam: EOMI, Normal appearance, PERRL


Pupil Exam: NORMAL ACCOMODATION, PERRL





- ENT Exam


ENT Exam: Mucous Membranes Moist, Normal Exam





- Neck Exam


Neck Exam: Full ROM, Normal Inspection.  absent: Lymphadenopathy





- Respiratory Exam


Respiratory Exam: Clear to Ausculation Bilateral, NORMAL BREATHING PATTERN





- Cardiovascular Exam


Cardiovascular Exam: REGULAR RHYTHM, RRR, +S1, +S2.  absent: Murmur





- GI/Abdominal Exam


GI & Abdominal Exam: Soft, Normal Bowel Sounds.  absent: Tenderness





- Extremities Exam


Extremities Exam: Full ROM, Normal Capillary Refill, Normal Inspection.  absent

: Joint Swelling, Pedal Edema





- Back Exam


Back Exam: NORMAL INSPECTION





- Neurological Exam


Neurological Exam: Alert, Awake, CN II-XII Intact, Normal Gait, Oriented x3





- Psychiatric Exam


Psychiatric exam: Normal Affect, Normal Mood





- Skin


Skin Exam: Dry, Intact, Normal Color, Warm





Assessment and Plan


(1) DVT prophylaxis


Status: Chronic





(2) CAD (coronary artery disease)


Status: Chronic





(3) Smoking


Status: Chronic





(4) Low back pain


Status: Chronic





(5) Scrotal edema


Status: Chronic





(6) Prediabetes


Status: Chronic





(7) Neurocognitive disorder


Status: Chronic








- Assessment and Plan (Free Text)


Assessment: 


cont plan, meds, SW intervention

## 2017-03-16 RX ADMIN — DIVALPROEX SODIUM SCH MG: 500 TABLET, DELAYED RELEASE ORAL at 08:19

## 2017-03-16 RX ADMIN — DIVALPROEX SODIUM SCH MG: 500 TABLET, DELAYED RELEASE ORAL at 16:30

## 2017-03-17 RX ADMIN — DIVALPROEX SODIUM SCH MG: 500 TABLET, DELAYED RELEASE ORAL at 16:28

## 2017-03-17 RX ADMIN — DIVALPROEX SODIUM SCH MG: 500 TABLET, DELAYED RELEASE ORAL at 08:38

## 2017-03-17 NOTE — CP.PCM.PN
Subjective





- Date & Time of Evaluation


Date of Evaluation: 03/17/17


Time of Evaluation: 09:08





- Subjective


Subjective: 


no complaints/distress


no f/c, nv/d/


pending placement





Objective





- Vital Signs/Intake and Output


Vital Signs (last 24 hours): 


 











Temp Pulse Resp BP Pulse Ox


 


 98.3 F   76   20   123/83   100 


 


 03/17/17 08:08  03/17/17 08:40  03/17/17 08:08  03/17/17 08:40  03/17/17 08:08











- Medications


Medications: 


 Current Medications





Aspirin (Ecotrin)  [facility]ILY [facility]


   Last Admin: 03/17/17 08:39 Dose:  81 mg


Atorvastatin Calcium (Lipitor)  20 mg PO HS [facility]


   Last Admin: 03/16/17 22:31 Dose:  20 mg


Divalproex Sodium (Depakote Dr(*Bid*))  500 mg PO BID [facility]


   Last Admin: 03/17/17 08:38 Dose:  500 mg


Docusate Sodium (Colace)  100 mg PO BID [facility]


   Last Admin: 03/17/17 08:36 Dose:  100 mg


Enalapril Maleate (Vasotec)  10 mg PO DAILY [facility]


   Last Admin: 03/17/17 08:41 Dose:  10 mg


Famotidine (Pepcid)  20 mg PO BID [facility]


   Last Admin: 03/17/17 08:41 Dose:  20 mg


Ibuprofen (Motrin Tab)  600 mg PO Q8 PRN


   PRN Reason: Pain, moderate (4-7)


   Last Admin: 01/06/17 16:32 Dose:  600 mg


Metoprolol Tartrate (Lopressor)  50 mg PO Q12 [facility]


   Last Admin: 03/17/17 08:40 Dose:  50 mg


Quetiapine Fumarate (Seroquel)  25 mg PO HS [facility]


   Last Admin: 03/16/17 22:30 Dose:  25 mg











- Labs


Labs: 


 





 02/02/17 10:12 





 02/02/17 10:12 





 











PT  11.2 SECONDS (9.4-12.0)   12/14/15  05:20    


 


INR  1.05  (0.89-1.20)   12/14/15  05:20    


 


APTT  36.2 SECONDS (23.1-32.0)  H  12/14/15  05:20    














- Constitutional


Appears: Well, Non-toxic, No Acute Distress





- Head Exam


Head Exam: ATRAUMATIC, NORMAL INSPECTION, NORMOCEPHALIC





- Eye Exam


Eye Exam: EOMI, Normal appearance, PERRL


Pupil Exam: NORMAL ACCOMODATION, PERRL





- ENT Exam


ENT Exam: Mucous Membranes Moist, Normal Exam





- Neck Exam


Neck Exam: Full ROM, Normal Inspection.  absent: Lymphadenopathy





- Respiratory Exam


Respiratory Exam: Clear to Ausculation Bilateral, NORMAL BREATHING PATTERN





- Cardiovascular Exam


Cardiovascular Exam: REGULAR RHYTHM, RRR, +S1, +S2.  absent: Murmur





- GI/Abdominal Exam


GI & Abdominal Exam: Soft, Normal Bowel Sounds.  absent: Tenderness





-  Exam


Bimanual exam: NORMAL BIMANUAL EXAM





- Extremities Exam


Extremities Exam: Full ROM, Normal Capillary Refill, Normal Inspection.  absent

: Joint Swelling, Pedal Edema





- Back Exam


Back Exam: NORMAL INSPECTION





- Neurological Exam


Neurological Exam: Alert, Awake, CN II-XII Intact, Normal Gait, Oriented x3





- Psychiatric Exam


Psychiatric exam: Normal Affect, Normal Mood





- Skin


Skin Exam: Dry, Intact, Normal Color, Warm





Assessment and Plan


(1) DVT prophylaxis


Status: Chronic





(2) CAD (coronary artery disease)


Status: Chronic





(3) Smoking


Status: Chronic





(4) Low back pain


Status: Chronic





(5) Scrotal edema


Status: Chronic





(6) Prediabetes


Status: Chronic





(7) Neurocognitive disorder


Status: Chronic








- Assessment and Plan (Free Text)


Assessment: 


cont meds, sw, and interventions

## 2017-03-18 RX ADMIN — DIVALPROEX SODIUM SCH MG: 500 TABLET, DELAYED RELEASE ORAL at 17:47

## 2017-03-18 RX ADMIN — DIVALPROEX SODIUM SCH MG: 500 TABLET, DELAYED RELEASE ORAL at 09:37

## 2017-03-18 NOTE — CP.PCM.PN
Subjective





- Date & Time of Evaluation


Date of Evaluation: 03/18/17


Time of Evaluation: 07:33





- Subjective


Subjective: 


no complaints/distress


no f/c, nv/d/


pending placement





Objective





- Vital Signs/Intake and Output


Vital Signs (last 24 hours): 


 











Temp Pulse Resp BP Pulse Ox


 


 99.3 F   80   20   111/75   96 


 


 03/17/17 20:16  03/17/17 21:17  03/17/17 20:16  03/17/17 21:17  03/17/17 20:16











- Medications


Medications: 


 Current Medications





Aspirin (Ecotrin)  81 mg PO DAILY [facility]


   Last Admin: 03/17/17 08:39 Dose:  81 mg


Atorvastatin Calcium (Lipitor)  20 mg PO HS [facility]


   Last Admin: 03/17/17 21:21 Dose:  20 mg


Divalproex Sodium (Depakote Dr(*Bid*))  500 mg PO BID [facility]


   Last Admin: 03/17/17 16:28 Dose:  500 mg


Docusate Sodium (Colace)  100 mg PO BID [facility]


   Last Admin: 03/17/17 16:27 Dose:  100 mg


Enalapril Maleate (Vasotec)  10 mg PO DAILY [facility]


   Last Admin: 03/17/17 08:41 Dose:  10 mg


Famotidine (Pepcid)  20 mg PO BID [facility]


   Last Admin: 03/17/17 16:28 Dose:  20 mg


Ibuprofen (Motrin Tab)  600 mg PO Q8 PRN


   PRN Reason: Pain, moderate (4-7)


   Last Admin: 01/06/17 16:32 Dose:  600 mg


Metoprolol Tartrate (Lopressor)  50 mg PO Q12 [facility]


   Last Admin: 03/17/17 21:17 Dose:  50 mg


Quetiapine Fumarate (Seroquel)  25 mg PO HS [facility]


   Last Admin: 03/17/17 21:22 Dose:  25 mg











- Labs


Labs: 


 





 02/02/17 10:12 





 02/02/17 10:12 





 











PT  11.2 SECONDS (9.4-12.0)   12/14/15  05:20    


 


INR  1.05  (0.89-1.20)   12/14/15  05:20    


 


APTT  36.2 SECONDS (23.1-32.0)  H  12/14/15  05:20    














- Constitutional


Appears: Well, Non-toxic, No Acute Distress





- Head Exam


Head Exam: ATRAUMATIC, NORMAL INSPECTION, NORMOCEPHALIC





- Eye Exam


Eye Exam: EOMI, Normal appearance, PERRL


Pupil Exam: NORMAL ACCOMODATION, PERRL





- ENT Exam


ENT Exam: Mucous Membranes Moist, Normal Exam





- Neck Exam


Neck Exam: Full ROM, Normal Inspection.  absent: Lymphadenopathy





- Respiratory Exam


Respiratory Exam: Clear to Ausculation Bilateral, NORMAL BREATHING PATTERN





- Cardiovascular Exam


Cardiovascular Exam: REGULAR RHYTHM, +S1, +S2.  absent: Murmur





- GI/Abdominal Exam


GI & Abdominal Exam: Soft, Normal Bowel Sounds.  absent: Tenderness





- Extremities Exam


Extremities Exam: Full ROM, Normal Capillary Refill, Normal Inspection.  absent

: Joint Swelling, Pedal Edema





- Back Exam


Back Exam: NORMAL INSPECTION





- Neurological Exam


Neurological Exam: Alert, Awake, CN II-XII Intact, Normal Gait, Oriented x3





- Psychiatric Exam


Psychiatric exam: Normal Affect, Normal Mood





- Skin


Skin Exam: Dry, Intact, Normal Color, Warm





Assessment and Plan


(1) DVT prophylaxis


Status: Chronic





(2) CAD (coronary artery disease)


Status: Chronic





(3) Smoking


Status: Chronic





(4) Low back pain


Status: Chronic





(5) Scrotal edema


Status: Chronic





(6) Prediabetes


Status: Chronic





(7) Neurocognitive disorder


Status: Chronic








- Assessment and Plan (Free Text)


Assessment: 


cont sw intervention, meds, placement

## 2017-03-19 RX ADMIN — DIVALPROEX SODIUM SCH MG: 500 TABLET, DELAYED RELEASE ORAL at 15:59

## 2017-03-19 RX ADMIN — DIVALPROEX SODIUM SCH MG: 500 TABLET, DELAYED RELEASE ORAL at 08:16

## 2017-03-19 NOTE — CP.PCM.PN
Subjective





- Date & Time of Evaluation


Date of Evaluation: 03/19/17


Time of Evaluation: 10:34





- Subjective


Subjective: 


no complaints/distress


no f/c, nv/d/


pending palcement





Objective





- Vital Signs/Intake and Output


Vital Signs (last 24 hours): 


 











Temp Pulse Resp BP Pulse Ox


 


 97.7 F   80   20   114/80   99 


 


 03/19/17 08:09  03/19/17 08:09  03/19/17 08:09  03/19/17 08:09  03/19/17 08:09











- Medications


Medications: 


 Current Medications





Aspirin (Ecotrin)  81 mg PO DAILY [facility]


   Last Admin: 03/19/17 08:16 Dose:  81 mg


Atorvastatin Calcium (Lipitor)  20 mg PO HS [facility]


   Last Admin: 03/18/17 21:32 Dose:  20 mg


Divalproex Sodium (Depakote Dr(*Bid*))  500 mg PO BID [facility]


   Last Admin: 03/19/17 08:16 Dose:  500 mg


Docusate Sodium (Colace)  100 mg PO BID [facility]


   Last Admin: 03/19/17 08:16 Dose:  100 mg


Enalapril Maleate (Vasotec)  10 mg PO DAILY [facility]


   Last Admin: 03/19/17 08:17 Dose:  10 mg


Famotidine (Pepcid)  20 mg PO BID [facility]


   Last Admin: 03/19/17 08:16 Dose:  20 mg


Ibuprofen (Motrin Tab)  600 mg PO Q8 PRN


   PRN Reason: Pain, moderate (4-7)


   Last Admin: 01/06/17 16:32 Dose:  600 mg


Metoprolol Tartrate (Lopressor)  50 mg PO Q12 [facility]


   Last Admin: 03/19/17 08:16 Dose:  50 mg


Quetiapine Fumarate (Seroquel)  25 mg PO HS [facility]


   Last Admin: 03/18/17 21:33 Dose:  25 mg











- Labs


Labs: 


 





 02/02/17 10:12 





 02/02/17 10:12 





 











PT  11.2 SECONDS (9.4-12.0)   12/14/15  05:20    


 


INR  1.05  (0.89-1.20)   12/14/15  05:20    


 


APTT  36.2 SECONDS (23.1-32.0)  H  12/14/15  05:20    














- Constitutional


Appears: Well, Non-toxic, No Acute Distress





- Head Exam


Head Exam: ATRAUMATIC, NORMAL INSPECTION, NORMOCEPHALIC





- Eye Exam


Eye Exam: EOMI, Normal appearance, PERRL


Pupil Exam: NORMAL ACCOMODATION, PERRL





- ENT Exam


ENT Exam: Mucous Membranes Moist, Normal Exam





- Neck Exam


Neck Exam: Full ROM, Normal Inspection.  absent: Lymphadenopathy





- Respiratory Exam


Respiratory Exam: Clear to Ausculation Bilateral, NORMAL BREATHING PATTERN





- Cardiovascular Exam


Cardiovascular Exam: REGULAR RHYTHM, +S1, +S2.  absent: Murmur





- GI/Abdominal Exam


GI & Abdominal Exam: Soft, Normal Bowel Sounds.  absent: Tenderness





- Extremities Exam


Extremities Exam: Full ROM, Normal Capillary Refill, Normal Inspection.  absent

: Joint Swelling, Pedal Edema





- Back Exam


Back Exam: NORMAL INSPECTION





- Neurological Exam


Neurological Exam: Alert, Awake, CN II-XII Intact, Normal Gait, Oriented x3





- Psychiatric Exam


Psychiatric exam: Normal Affect, Normal Mood





- Skin


Skin Exam: Dry, Intact, Normal Color, Warm





Assessment and Plan


(1) DVT prophylaxis


Status: Chronic





(2) CAD (coronary artery disease)


Status: Chronic





(3) Smoking


Status: Chronic





(4) Low back pain


Status: Chronic





(5) Scrotal edema


Status: Chronic





(6) Prediabetes


Status: Chronic





(7) Neurocognitive disorder


Status: Chronic








- Assessment and Plan (Free Text)


Assessment: 


cont meds, interventions, sw, placeement

## 2017-03-20 RX ADMIN — DIVALPROEX SODIUM SCH MG: 500 TABLET, DELAYED RELEASE ORAL at 16:03

## 2017-03-20 RX ADMIN — DIVALPROEX SODIUM SCH MG: 500 TABLET, DELAYED RELEASE ORAL at 08:01

## 2017-03-20 NOTE — CP.PCM.PN
Subjective





- Date & Time of Evaluation


Date of Evaluation: 03/20/17


Time of Evaluation: 07:25





- Subjective


Subjective: 


no complaints/dsitress


no f/c, n/v/d


penidng palcement





Objective





- Vital Signs/Intake and Output


Vital Signs (last 24 hours): 


 











Temp Pulse Resp BP Pulse Ox


 


 97.9 F   81   19   124/79   97 


 


 03/20/17 01:00  03/20/17 01:00  03/20/17 01:00  03/20/17 01:00  03/20/17 01:00











- Medications


Medications: 


 Current Medications





Aspirin (Ecotrin)  81 mg PO DAILY [facility]


   Last Admin: 03/19/17 08:16 Dose:  81 mg


Atorvastatin Calcium (Lipitor)  20 mg PO HS [facility]


   Last Admin: 03/19/17 21:39 Dose:  20 mg


Divalproex Sodium (Depakote Dr(*Bid*))  500 mg PO BID [facility]


   Last Admin: 03/19/17 15:59 Dose:  500 mg


Docusate Sodium (Colace)  100 mg PO BID [facility]


   Last Admin: 03/19/17 15:59 Dose:  100 mg


Enalapril Maleate (Vasotec)  10 mg PO DAILY [facility]


   Last Admin: 03/19/17 08:17 Dose:  10 mg


Famotidine (Pepcid)  20 mg PO BID [facility]


   Last Admin: 03/19/17 15:59 Dose:  20 mg


Ibuprofen (Motrin Tab)  600 mg PO Q8 PRN


   PRN Reason: Pain, moderate (4-7)


   Last Admin: 01/06/17 16:32 Dose:  600 mg


Metoprolol Tartrate (Lopressor)  50 mg PO Q12 [facility]


   Last Admin: 03/19/17 21:40 Dose:  50 mg


Quetiapine Fumarate (Seroquel)  25 mg PO HS [facility]


   Last Admin: 03/19/17 21:40 Dose:  25 mg











- Labs


Labs: 


 





 02/02/17 10:12 





 02/02/17 10:12 





 











PT  11.2 SECONDS (9.4-12.0)   12/14/15  05:20    


 


INR  1.05  (0.89-1.20)   12/14/15  05:20    


 


APTT  36.2 SECONDS (23.1-32.0)  H  12/14/15  05:20    














- Constitutional


Appears: Well, Non-toxic, No Acute Distress





- Head Exam


Head Exam: ATRAUMATIC, NORMAL INSPECTION, NORMOCEPHALIC





- Eye Exam


Eye Exam: EOMI, Normal appearance, PERRL


Pupil Exam: NORMAL ACCOMODATION, PERRL





- ENT Exam


ENT Exam: Mucous Membranes Moist, Normal Exam





- Neck Exam


Neck Exam: Full ROM, Normal Inspection.  absent: Lymphadenopathy





- Respiratory Exam


Respiratory Exam: Clear to Ausculation Bilateral, NORMAL BREATHING PATTERN





- Cardiovascular Exam


Cardiovascular Exam: REGULAR RHYTHM, RRR, +S1, +S2.  absent: Murmur





- GI/Abdominal Exam


GI & Abdominal Exam: Soft, Normal Bowel Sounds.  absent: Tenderness





- Extremities Exam


Extremities Exam: Full ROM, Normal Capillary Refill, Normal Inspection.  absent

: Joint Swelling, Pedal Edema





- Back Exam


Back Exam: NORMAL INSPECTION





- Neurological Exam


Neurological Exam: Alert, Awake, CN II-XII Intact, Normal Gait, Oriented x3





- Psychiatric Exam


Psychiatric exam: Normal Affect, Normal Mood





- Skin


Skin Exam: Dry, Intact, Normal Color, Warm





Assessment and Plan


(1) DVT prophylaxis


Status: Chronic





(2) CAD (coronary artery disease)


Status: Chronic





(3) Smoking


Status: Chronic





(4) Low back pain


Status: Chronic





(5) Scrotal edema


Status: Chronic





(6) Prediabetes


Status: Chronic





(7) Neurocognitive disorder


Status: Chronic








- Assessment and Plan (Free Text)


Assessment: 


cont meds, placeemtn, intervention, sw

## 2017-03-21 RX ADMIN — DIVALPROEX SODIUM SCH MG: 500 TABLET, DELAYED RELEASE ORAL at 09:34

## 2017-03-21 RX ADMIN — DIVALPROEX SODIUM SCH MG: 500 TABLET, DELAYED RELEASE ORAL at 17:21

## 2017-03-21 NOTE — CP.PCM.PN
Subjective





- Date & Time of Evaluation


Date of Evaluation: 03/21/17


Time of Evaluation: 08:18





- Subjective


Subjective: 


no complaints/dsitress


no f/c, n/v/d


pending placement





Objective





- Vital Signs/Intake and Output


Vital Signs (last 24 hours): 


 











Temp Pulse Resp BP Pulse Ox


 


 97.8 F   60   20   118/72   97 


 


 03/21/17 08:13  03/21/17 08:13  03/21/17 08:13  03/21/17 08:13  03/21/17 08:13











- Medications


Medications: 


 Current Medications





Aspirin (Ecotrin)  81 mg PO DAILY [facility]


   Last Admin: 03/20/17 08:01 Dose:  81 mg


Atorvastatin Calcium (Lipitor)  20 mg PO HS [facility]


   Last Admin: 03/20/17 21:28 Dose:  20 mg


Divalproex Sodium (Depakote Dr(*Bid*))  500 mg PO BID [facility]


   Last Admin: 03/20/17 16:03 Dose:  500 mg


Docusate Sodium (Colace)  100 mg PO BID [facility]


   Last Admin: 03/20/17 16:03 Dose:  100 mg


Enalapril Maleate (Vasotec)  10 mg PO DAILY [facility]


   Last Admin: 03/20/17 08:01 Dose:  10 mg


Famotidine (Pepcid)  20 mg PO BID [facility]


   Last Admin: 03/20/17 16:03 Dose:  20 mg


Ibuprofen (Motrin Tab)  600 mg PO Q8 PRN


   PRN Reason: Pain, moderate (4-7)


   Last Admin: 01/06/17 16:32 Dose:  600 mg


Metoprolol Tartrate (Lopressor)  50 mg PO Q12 [facility]


   Last Admin: 03/20/17 21:28 Dose:  50 mg


Quetiapine Fumarate (Seroquel)  25 mg PO HS [facility]


   Last Admin: 03/20/17 21:28 Dose:  25 mg











- Labs


Labs: 


 





 02/02/17 10:12 





 02/02/17 10:12 





 











PT  11.2 SECONDS (9.4-12.0)   12/14/15  05:20    


 


INR  1.05  (0.89-1.20)   12/14/15  05:20    


 


APTT  36.2 SECONDS (23.1-32.0)  H  12/14/15  05:20    














- Constitutional


Appears: Well, Non-toxic, No Acute Distress





- Head Exam


Head Exam: ATRAUMATIC, NORMAL INSPECTION, NORMOCEPHALIC





- Eye Exam


Eye Exam: EOMI, Normal appearance, PERRL


Pupil Exam: NORMAL ACCOMODATION, PERRL





- ENT Exam


ENT Exam: Mucous Membranes Moist, Normal Exam





- Neck Exam


Neck Exam: Full ROM, Normal Inspection.  absent: Lymphadenopathy





- Respiratory Exam


Respiratory Exam: Clear to Ausculation Bilateral, NORMAL BREATHING PATTERN





- Cardiovascular Exam


Cardiovascular Exam: REGULAR RHYTHM, RRR, +S1, +S2.  absent: Murmur





- GI/Abdominal Exam


GI & Abdominal Exam: Soft, Normal Bowel Sounds.  absent: Tenderness





- Extremities Exam


Extremities Exam: Full ROM, Normal Capillary Refill, Normal Inspection.  absent

: Joint Swelling, Pedal Edema





- Back Exam


Back Exam: NORMAL INSPECTION





- Neurological Exam


Neurological Exam: Alert, Awake, CN II-XII Intact, Normal Gait, Oriented x3





- Psychiatric Exam


Psychiatric exam: Normal Affect, Normal Mood





- Skin


Skin Exam: Dry, Intact, Normal Color, Warm





Assessment and Plan


(1) DVT prophylaxis


Status: Chronic





(2) CAD (coronary artery disease)


Status: Chronic





(3) Smoking


Status: Chronic





(4) Low back pain


Status: Chronic





(5) Scrotal edema


Status: Chronic





(6) Prediabetes


Status: Chronic





(7) Neurocognitive disorder


Status: Chronic








- Assessment and Plan (Free Text)


Assessment: 


cont meds, intervnetions, sw placement

## 2017-03-22 RX ADMIN — DIVALPROEX SODIUM SCH MG: 500 TABLET, DELAYED RELEASE ORAL at 08:33

## 2017-03-22 RX ADMIN — DIVALPROEX SODIUM SCH MG: 500 TABLET, DELAYED RELEASE ORAL at 18:00

## 2017-03-22 NOTE — CP.PCM.PN
Subjective





- Date & Time of Evaluation


Date of Evaluation: 03/22/17


Time of Evaluation: 08:09





- Subjective


Subjective: 


no complaints/distress


no f/c, n/v/d


penidng placement





Objective





- Vital Signs/Intake and Output


Vital Signs (last 24 hours): 


 











Temp Pulse Resp BP Pulse Ox


 


 97.3 F L  59 L  20   111/77   98 


 


 03/22/17 07:53  03/22/17 07:53  03/22/17 07:53  03/22/17 07:53  03/22/17 07:53











- Medications


Medications: 


 Current Medications





Aspirin (Ecotrin)  81 mg PO DAILY [facility]


   Last Admin: 03/21/17 09:34 Dose:  81 mg


Atorvastatin Calcium (Lipitor)  20 mg PO HS [facility]


   Last Admin: 03/21/17 21:51 Dose:  20 mg


Divalproex Sodium (Depakote Dr(*Bid*))  500 mg PO BID [facility]


   Last Admin: 03/21/17 17:21 Dose:  500 mg


Docusate Sodium (Colace)  100 mg PO BID [facility]


   Last Admin: 03/21/17 17:21 Dose:  100 mg


Enalapril Maleate (Vasotec)  10 mg PO DAILY [facility]


   Last Admin: 03/21/17 09:34 Dose:  10 mg


Famotidine (Pepcid)  20 mg PO BID [facility]


   Last Admin: 03/21/17 17:21 Dose:  20 mg


Ibuprofen (Motrin Tab)  600 mg PO Q8 PRN


   PRN Reason: Pain, moderate (4-7)


   Last Admin: 01/06/17 16:32 Dose:  600 mg


Metoprolol Tartrate (Lopressor)  50 mg PO Q12 [facility]


   Last Admin: 03/21/17 21:52 Dose:  50 mg


Quetiapine Fumarate (Seroquel)  25 mg PO HS [facility]


   Last Admin: 03/21/17 21:52 Dose:  25 mg











- Labs


Labs: 


 





 02/02/17 10:12 





 02/02/17 10:12 





 











PT  11.2 SECONDS (9.4-12.0)   12/14/15  05:20    


 


INR  1.05  (0.89-1.20)   12/14/15  05:20    


 


APTT  36.2 SECONDS (23.1-32.0)  H  12/14/15  05:20    














- Constitutional


Appears: Well, Non-toxic, No Acute Distress





- Head Exam


Head Exam: ATRAUMATIC, NORMAL INSPECTION, NORMOCEPHALIC





- Eye Exam


Eye Exam: EOMI, Normal appearance, PERRL


Pupil Exam: NORMAL ACCOMODATION, PERRL





- ENT Exam


ENT Exam: Mucous Membranes Moist, Normal Exam





- Neck Exam


Neck Exam: Full ROM, Normal Inspection.  absent: Lymphadenopathy





- Respiratory Exam


Respiratory Exam: Clear to Ausculation Bilateral, NORMAL BREATHING PATTERN





- Cardiovascular Exam


Cardiovascular Exam: REGULAR RHYTHM, RRR, +S1, +S2.  absent: Murmur





- GI/Abdominal Exam


GI & Abdominal Exam: Soft, Normal Bowel Sounds.  absent: Tenderness





- Extremities Exam


Extremities Exam: Full ROM, Normal Capillary Refill, Normal Inspection.  absent

: Joint Swelling, Pedal Edema





- Back Exam


Back Exam: NORMAL INSPECTION





- Neurological Exam


Neurological Exam: Alert, Awake, CN II-XII Intact, Normal Gait, Oriented x3





- Psychiatric Exam


Psychiatric exam: Normal Affect, Normal Mood





- Skin


Skin Exam: Dry, Intact, Normal Color, Warm





Assessment and Plan


(1) DVT prophylaxis


Status: Chronic





(2) CAD (coronary artery disease)


Status: Chronic





(3) Smoking


Status: Chronic





(4) Low back pain


Status: Chronic





(5) Scrotal edema


Status: Chronic





(6) Prediabetes


Status: Chronic





(7) Neurocognitive disorder


Status: Chronic








- Assessment and Plan (Free Text)


Assessment: 


cont all meds,intervention, therapies


cont placement and SW

## 2017-03-23 LAB
ALBUMIN SERPL-MCNC: 3.7 G/DL (ref 3.5–5)
ALBUMIN/GLOB SERPL: 0.9 {RATIO} (ref 1–2.1)
ALT SERPL-CCNC: 35 U/L (ref 21–72)
AST SERPL-CCNC: 22 U/L (ref 17–59)
BASOPHILS # BLD AUTO: 0 K/UL (ref 0–0.2)
BASOPHILS NFR BLD: 0.4 % (ref 0–2)
BUN SERPL-MCNC: 19 MG/DL (ref 9–20)
CALCIUM SERPL-MCNC: 9.3 MG/DL (ref 8.4–10.2)
EOSINOPHIL # BLD AUTO: 0.4 K/UL (ref 0–0.7)
EOSINOPHIL NFR BLD: 5 % (ref 0–4)
ERYTHROCYTE [DISTWIDTH] IN BLOOD BY AUTOMATED COUNT: 13.6 % (ref 11.5–14.5)
GFR NON-AFRICAN AMERICAN: > 60
HDLC SERPL-MCNC: 24 MG/DL (ref 30–70)
HGB BLD-MCNC: 12.8 G/DL (ref 12–18)
LDLC SERPL-MCNC: 111 MG/DL (ref 0–129)
LYMPHOCYTES # BLD AUTO: 3.3 K/UL (ref 1–4.3)
LYMPHOCYTES NFR BLD AUTO: 42.5 % (ref 20–40)
MCH RBC QN AUTO: 30.1 PG (ref 27–31)
MCHC RBC AUTO-ENTMCNC: 33.1 G/DL (ref 33–37)
MCV RBC AUTO: 91.2 FL (ref 80–94)
MONOCYTES # BLD: 0.9 K/UL (ref 0–0.8)
MONOCYTES NFR BLD: 11.3 % (ref 0–10)
NEUTROPHILS # BLD: 3.2 K/UL (ref 1.8–7)
NEUTROPHILS NFR BLD AUTO: 40.8 % (ref 50–75)
NRBC BLD AUTO-RTO: 0.3 % (ref 0–0)
PLATELET # BLD: 221 K/UL (ref 130–400)
PMV BLD AUTO: 7.2 FL (ref 7.2–11.7)
RBC # BLD AUTO: 4.26 MIL/UL (ref 4.4–5.9)
WBC # BLD AUTO: 7.9 K/UL (ref 4.8–10.8)

## 2017-03-23 RX ADMIN — DIVALPROEX SODIUM SCH MG: 500 TABLET, DELAYED RELEASE ORAL at 16:49

## 2017-03-23 RX ADMIN — DIVALPROEX SODIUM SCH MG: 500 TABLET, DELAYED RELEASE ORAL at 08:30

## 2017-03-23 NOTE — CP.PCM.PN
Subjective





- Date & Time of Evaluation


Date of Evaluation: 03/23/17


Time of Evaluation: 08:29





- Subjective


Subjective: 


no complaints/distress


no f/c, n/v/d


penidng placement





Objective





- Vital Signs/Intake and Output


Vital Signs (last 24 hours): 


 











Temp Pulse Resp BP Pulse Ox


 


 98.6 F   79   20   106/71   99 


 


 03/22/17 21:14  03/22/17 21:38  03/22/17 21:14  03/22/17 21:38  03/22/17 21:14











- Medications


Medications: 


 Current Medications





Aspirin (Ecotrin)  81 mg PO DAILY [facility]


   Last Admin: 03/22/17 08:33 Dose:  81 mg


Atorvastatin Calcium (Lipitor)  20 mg PO HS [facility]


   Last Admin: 03/22/17 21:38 Dose:  20 mg


Divalproex Sodium (Depakote Dr(*Bid*))  500 mg PO BID [facility]


   Last Admin: 03/22/17 18:00 Dose:  500 mg


Docusate Sodium (Colace)  100 mg PO BID [facility]


   Last Admin: 03/22/17 18:00 Dose:  100 mg


Enalapril Maleate (Vasotec)  10 mg PO DAILY [facility]


   Last Admin: 03/22/17 08:34 Dose:  10 mg


Famotidine (Pepcid)  20 mg PO BID [facility]


   Last Admin: 03/22/17 18:04 Dose:  20 mg


Fenofibrate (Tricor)  145 mg PO DAILY [facility]


Ibuprofen (Motrin Tab)  600 mg PO Q8 PRN


   PRN Reason: Pain, moderate (4-7)


   Last Admin: 01/06/17 16:32 Dose:  600 mg


Metoprolol Tartrate (Lopressor)  50 mg PO Q12 [facility]


   Last Admin: 03/22/17 21:38 Dose:  50 mg


Quetiapine Fumarate (Seroquel)  25 mg PO HS [facility]


   Last Admin: 03/22/17 21:38 Dose:  25 mg











- Labs


Labs: 


 





 03/23/17 05:45 





 03/23/17 05:45 





 











PT  11.2 SECONDS (9.4-12.0)   12/14/15  05:20    


 


INR  1.05  (0.89-1.20)   12/14/15  05:20    


 


APTT  36.2 SECONDS (23.1-32.0)  H  12/14/15  05:20    














- Constitutional


Appears: Well, Non-toxic, No Acute Distress





- Head Exam


Head Exam: ATRAUMATIC, NORMAL INSPECTION, NORMOCEPHALIC





- Eye Exam


Eye Exam: EOMI, Normal appearance, PERRL


Pupil Exam: NORMAL ACCOMODATION, PERRL





- ENT Exam


ENT Exam: Mucous Membranes Moist, Normal Exam





- Neck Exam


Neck Exam: Full ROM, Normal Inspection.  absent: Lymphadenopathy





- Respiratory Exam


Respiratory Exam: Clear to Ausculation Bilateral, NORMAL BREATHING PATTERN





- Cardiovascular Exam


Cardiovascular Exam: REGULAR RHYTHM, RRR, +S1, +S2.  absent: Murmur





- GI/Abdominal Exam


GI & Abdominal Exam: Soft, Normal Bowel Sounds.  absent: Tenderness





- Extremities Exam


Extremities Exam: Full ROM, Normal Capillary Refill, Normal Inspection.  absent

: Joint Swelling, Pedal Edema





- Back Exam


Back Exam: NORMAL INSPECTION





- Neurological Exam


Neurological Exam: Alert, Awake, CN II-XII Intact, Normal Gait, Oriented x3





- Psychiatric Exam


Psychiatric exam: Normal Affect, Normal Mood





- Skin


Skin Exam: Dry, Intact, Normal Color, Warm





Assessment and Plan


(1) DVT prophylaxis


Status: Chronic





(2) CAD (coronary artery disease)


Status: Chronic





(3) Smoking


Status: Chronic





(4) Low back pain


Status: Chronic





(5) Scrotal edema


Status: Chronic





(6) Prediabetes


Status: Chronic





(7) Neurocognitive disorder


Status: Chronic








- Assessment and Plan (Free Text)


Assessment: 


cont meds, intervention, sw, placement


add tricor

## 2017-03-24 RX ADMIN — DIVALPROEX SODIUM SCH MG: 500 TABLET, DELAYED RELEASE ORAL at 09:13

## 2017-03-24 RX ADMIN — DIVALPROEX SODIUM SCH MG: 500 TABLET, DELAYED RELEASE ORAL at 16:43

## 2017-03-24 NOTE — CP.PCM.PN
Subjective





- Date & Time of Evaluation


Date of Evaluation: 03/24/17


Time of Evaluation: 07:41





- Subjective


Subjective: 


no complaints/distress


no f/c, n/v/d


pending placement








Objective





- Vital Signs/Intake and Output


Vital Signs (last 24 hours): 


 











Temp Pulse Resp BP Pulse Ox


 


 98.4 F   83   20   114/68   98 


 


 03/23/17 21:40  03/23/17 21:40  03/23/17 21:40  03/23/17 21:40  03/23/17 21:40











- Medications


Medications: 


 Current Medications





Aspirin (Ecotrin)  81 mg PO DAILY [facility]


   Last Admin: 03/23/17 08:30 Dose:  81 mg


Atorvastatin Calcium (Lipitor)  20 mg PO HS [facility]


   Last Admin: 03/23/17 21:11 Dose:  20 mg


Divalproex Sodium (Depakote Dr(*Bid*))  500 mg PO BID [facility]


   Last Admin: 03/23/17 16:49 Dose:  500 mg


Docusate Sodium (Colace)  100 mg PO BID [facility]


   Last Admin: 03/23/17 16:48 Dose:  100 mg


Enalapril Maleate (Vasotec)  10 mg PO DAILY [facility]


   Last Admin: 03/23/17 12:37 Dose:  10 mg


Famotidine (Pepcid)  20 mg PO BID [facility]


   Last Admin: 03/23/17 16:49 Dose:  20 mg


Fenofibrate (Tricor)  145 mg PO DAILY [facility]


   Last Admin: 03/23/17 14:12 Dose:  145 mg


Ibuprofen (Motrin Tab)  600 mg PO Q8 PRN


   PRN Reason: Pain, moderate (4-7)


   Last Admin: 01/06/17 16:32 Dose:  600 mg


Metoprolol Tartrate (Lopressor)  50 mg PO Q12 [facility]


   Last Admin: 03/23/17 21:11 Dose:  50 mg


Quetiapine Fumarate (Seroquel)  25 mg PO HS [facility]


   Last Admin: 03/23/17 21:12 Dose:  25 mg











- Labs


Labs: 


 





 03/23/17 05:45 





 03/23/17 05:45 





 











PT  11.2 SECONDS (9.4-12.0)   12/14/15  05:20    


 


INR  1.05  (0.89-1.20)   12/14/15  05:20    


 


APTT  36.2 SECONDS (23.1-32.0)  H  12/14/15  05:20    














- Constitutional


Appears: Well, Non-toxic, No Acute Distress





- Head Exam


Head Exam: ATRAUMATIC, NORMAL INSPECTION, NORMOCEPHALIC





- Eye Exam


Eye Exam: EOMI, Normal appearance, PERRL


Pupil Exam: NORMAL ACCOMODATION, PERRL





- ENT Exam


ENT Exam: Mucous Membranes Moist, Normal Exam





- Neck Exam


Neck Exam: Full ROM, Normal Inspection.  absent: Lymphadenopathy





- Respiratory Exam


Respiratory Exam: Clear to Ausculation Bilateral, NORMAL BREATHING PATTERN





- Cardiovascular Exam


Cardiovascular Exam: REGULAR RHYTHM, RRR, +S1, +S2.  absent: Murmur





- GI/Abdominal Exam


GI & Abdominal Exam: Soft, Normal Bowel Sounds.  absent: Tenderness





- Extremities Exam


Extremities Exam: Full ROM, Normal Capillary Refill, Normal Inspection.  absent

: Joint Swelling, Pedal Edema





- Back Exam


Back Exam: NORMAL INSPECTION





- Neurological Exam


Neurological Exam: Alert, Awake, CN II-XII Intact, Normal Gait, Oriented x3





- Psychiatric Exam


Psychiatric exam: Normal Affect, Normal Mood





- Skin


Skin Exam: Dry, Intact, Normal Color, Warm





Assessment and Plan


(1) DVT prophylaxis


Status: Chronic





(2) CAD (coronary artery disease)


Status: Chronic





(3) Smoking


Status: Chronic





(4) Low back pain


Status: Chronic





(5) Scrotal edema


Status: Chronic





(6) Prediabetes


Status: Chronic





(7) Neurocognitive disorder


Status: Chronic








- Assessment and Plan (Free Text)


Assessment: 


cont meds, sw, placement, care,safety

## 2017-03-25 RX ADMIN — DIVALPROEX SODIUM SCH MG: 500 TABLET, DELAYED RELEASE ORAL at 16:34

## 2017-03-25 RX ADMIN — DIVALPROEX SODIUM SCH MG: 500 TABLET, DELAYED RELEASE ORAL at 09:17

## 2017-03-25 NOTE — CP.PCM.PN
Subjective





- Date & Time of Evaluation


Date of Evaluation: 03/25/17


Time of Evaluation: 08:41





- Subjective


Subjective: 


no complaints/distress


no f/c, n/v/d


pending palcement





Objective





- Vital Signs/Intake and Output


Vital Signs (last 24 hours): 


 











Temp Pulse Resp BP Pulse Ox


 


 98.4 F   72   20   106/69   95 


 


 03/24/17 22:00  03/24/17 22:00  03/24/17 22:00  03/24/17 22:00  03/24/17 22:00











- Medications


Medications: 


 Current Medications





Aspirin (Ecotrin)  81 mg PO DAILY [facility]


   Last Admin: 03/24/17 09:13 Dose:  81 mg


Atorvastatin Calcium (Lipitor)  20 mg PO HS [facility]


   Last Admin: 03/24/17 21:41 Dose:  20 mg


Divalproex Sodium (Depakote Dr(*Bid*))  500 mg PO BID [facility]


   Last Admin: 03/24/17 16:43 Dose:  500 mg


Docusate Sodium (Colace)  100 mg PO BID [facility]


   Last Admin: 03/24/17 16:43 Dose:  100 mg


Enalapril Maleate (Vasotec)  10 mg PO DAILY [facility]


   Last Admin: 03/24/17 09:13 Dose:  10 mg


Famotidine (Pepcid)  20 mg PO BID [facility]


   Last Admin: 03/24/17 16:43 Dose:  20 mg


Fenofibrate (Tricor)  145 mg PO DAILY [facility]


   Last Admin: 03/24/17 09:12 Dose:  145 mg


Ibuprofen (Motrin Tab)  600 mg PO Q8 PRN


   PRN Reason: Pain, moderate (4-7)


   Last Admin: 01/06/17 16:32 Dose:  600 mg


Metoprolol Tartrate (Lopressor)  50 mg PO Q12 [facility]


   Last Admin: 03/24/17 21:40 Dose:  50 mg


Quetiapine Fumarate (Seroquel)  25 mg PO HS [facility]


   Last Admin: 03/24/17 21:41 Dose:  25 mg











- Labs


Labs: 


 





 03/23/17 05:45 





 03/23/17 05:45 





 











PT  11.2 SECONDS (9.4-12.0)   12/14/15  05:20    


 


INR  1.05  (0.89-1.20)   12/14/15  05:20    


 


APTT  36.2 SECONDS (23.1-32.0)  H  12/14/15  05:20    














- Constitutional


Appears: Well, Non-toxic, No Acute Distress





- Head Exam


Head Exam: ATRAUMATIC, NORMAL INSPECTION, NORMOCEPHALIC





- Eye Exam


Eye Exam: EOMI, Normal appearance, PERRL


Pupil Exam: NORMAL ACCOMODATION, PERRL





- ENT Exam


ENT Exam: Mucous Membranes Moist, Normal Exam





- Neck Exam


Neck Exam: Full ROM, Normal Inspection.  absent: Lymphadenopathy





- Respiratory Exam


Respiratory Exam: Clear to Ausculation Bilateral, NORMAL BREATHING PATTERN





- Cardiovascular Exam


Cardiovascular Exam: REGULAR RHYTHM, RRR, +S1, +S2.  absent: Murmur





- GI/Abdominal Exam


GI & Abdominal Exam: Soft, Normal Bowel Sounds.  absent: Tenderness





- Extremities Exam


Extremities Exam: Full ROM, Normal Capillary Refill, Normal Inspection.  absent

: Joint Swelling, Pedal Edema





- Back Exam


Back Exam: NORMAL INSPECTION





- Neurological Exam


Neurological Exam: Alert, Awake, CN II-XII Intact, Normal Gait, Oriented x3





- Psychiatric Exam


Psychiatric exam: Normal Affect, Normal Mood





- Skin


Skin Exam: Dry, Intact, Normal Color, Warm





Assessment and Plan


(1) DVT prophylaxis


Status: Chronic





(2) CAD (coronary artery disease)


Status: Chronic





(3) Smoking


Status: Chronic





(4) Low back pain


Status: Chronic





(5) Scrotal edema


Status: Chronic





(6) Prediabetes


Status: Chronic





(7) Neurocognitive disorder


Status: Chronic








- Assessment and Plan (Free Text)


Assessment: 


pneding placement


cont all meds, interventiosn, therapies

## 2017-03-26 RX ADMIN — DIVALPROEX SODIUM SCH MG: 500 TABLET, DELAYED RELEASE ORAL at 17:03

## 2017-03-26 RX ADMIN — DIVALPROEX SODIUM SCH MG: 500 TABLET, DELAYED RELEASE ORAL at 08:43

## 2017-03-26 NOTE — CP.PCM.PN
Subjective





- Date & Time of Evaluation


Date of Evaluation: 03/26/17


Time of Evaluation: 07:12





- Subjective


Subjective: 


no complaints/distress


no f/c, n/v/d


pending palcement





Objective





- Vital Signs/Intake and Output


Vital Signs (last 24 hours): 


 











Temp Pulse Resp BP Pulse Ox


 


 98.4 F   101 H  18   105/73   93 L


 


 03/25/17 22:43  03/25/17 22:43  03/25/17 22:43  03/25/17 22:43  03/25/17 22:43











- Medications


Medications: 


 Current Medications





Aspirin (Ecotrin)  81 mg PO DAILY [facility]


   Last Admin: 03/25/17 09:17 Dose:  81 mg


Atorvastatin Calcium (Lipitor)  20 mg PO HS [facility]


   Last Admin: 03/25/17 21:42 Dose:  20 mg


Divalproex Sodium (Depakote Dr(*Bid*))  500 mg PO BID [facility]


   Last Admin: 03/25/17 16:34 Dose:  500 mg


Docusate Sodium (Colace)  100 mg PO BID [facility]


   Last Admin: 03/25/17 16:34 Dose:  100 mg


Enalapril Maleate (Vasotec)  10 mg PO DAILY [facility]


   Last Admin: 03/25/17 09:19 Dose:  10 mg


Famotidine (Pepcid)  20 mg PO BID [facility]


   Last Admin: 03/25/17 16:34 Dose:  20 mg


Fenofibrate (Tricor)  145 mg PO DAILY [facility]


   Last Admin: 03/25/17 09:18 Dose:  145 mg


Ibuprofen (Motrin Tab)  600 mg PO Q8 PRN


   PRN Reason: Pain, moderate (4-7)


   Last Admin: 01/06/17 16:32 Dose:  600 mg


Metoprolol Tartrate (Lopressor)  50 mg PO Q12 [facility]


   Last Admin: 03/25/17 21:42 Dose:  50 mg


Quetiapine Fumarate (Seroquel)  25 mg PO HS [facility]


   Last Admin: 03/25/17 21:42 Dose:  25 mg











- Labs


Labs: 


 





 03/23/17 05:45 





 03/23/17 05:45 





 











PT  11.2 SECONDS (9.4-12.0)   12/14/15  05:20    


 


INR  1.05  (0.89-1.20)   12/14/15  05:20    


 


APTT  36.2 SECONDS (23.1-32.0)  H  12/14/15  05:20    














- Constitutional


Appears: Well, Non-toxic, No Acute Distress





- Head Exam


Head Exam: ATRAUMATIC, NORMAL INSPECTION, NORMOCEPHALIC





- Eye Exam


Eye Exam: EOMI, Normal appearance, PERRL


Pupil Exam: NORMAL ACCOMODATION, PERRL





- ENT Exam


ENT Exam: Mucous Membranes Moist, Normal Exam





- Neck Exam


Neck Exam: Full ROM, Normal Inspection.  absent: Lymphadenopathy





- Respiratory Exam


Respiratory Exam: Clear to Ausculation Bilateral, NORMAL BREATHING PATTERN





- Cardiovascular Exam


Cardiovascular Exam: REGULAR RHYTHM, RRR, +S1, +S2.  absent: Murmur





- GI/Abdominal Exam


GI & Abdominal Exam: Soft, Normal Bowel Sounds.  absent: Tenderness





- Extremities Exam


Extremities Exam: Full ROM, Normal Capillary Refill, Normal Inspection.  absent

: Joint Swelling, Pedal Edema





- Back Exam


Back Exam: NORMAL INSPECTION





- Neurological Exam


Neurological Exam: Alert, Awake, CN II-XII Intact, Normal Gait, Oriented x3





- Psychiatric Exam


Psychiatric exam: Normal Affect, Normal Mood





- Skin


Skin Exam: Dry, Intact, Normal Color, Warm





Assessment and Plan


(1) DVT prophylaxis


Status: Chronic





(2) CAD (coronary artery disease)


Status: Chronic





(3) Smoking


Status: Chronic





(4) Low back pain


Status: Chronic





(5) Scrotal edema


Status: Chronic





(6) Prediabetes


Status: Chronic





(7) Neurocognitive disorder


Status: Chronic








- Assessment and Plan (Free Text)


Assessment: 


cont meds, diet, activity


cont sw and placement

## 2017-03-27 RX ADMIN — DIVALPROEX SODIUM SCH MG: 500 TABLET, DELAYED RELEASE ORAL at 08:41

## 2017-03-27 RX ADMIN — DIVALPROEX SODIUM SCH MG: 500 TABLET, DELAYED RELEASE ORAL at 16:25

## 2017-03-27 NOTE — CP.PCM.PN
Subjective





- Date & Time of Evaluation


Date of Evaluation: 03/27/17


Time of Evaluation: 07:17





- Subjective


Subjective: 


no complaints/distress


no f/c, nv//d


pending paclement





Objective





- Vital Signs/Intake and Output


Vital Signs (last 24 hours): 


 











Temp Pulse Resp BP Pulse Ox


 


 98.4 F   86   20   123/83   97 


 


 03/26/17 21:00  03/26/17 21:20  03/26/17 21:00  03/26/17 21:20  03/26/17 21:00











- Medications


Medications: 


 Current Medications





Aspirin (Ecotrin)  81 mg PO DAILY [facility]


   Last Admin: 03/26/17 08:44 Dose:  81 mg


Atorvastatin Calcium (Lipitor)  20 mg PO HS [facility]


   Last Admin: 03/26/17 21:20 Dose:  20 mg


Divalproex Sodium (Depakote Dr(*Bid*))  500 mg PO BID [facility]


   Last Admin: 03/26/17 17:03 Dose:  500 mg


Docusate Sodium (Colace)  100 mg PO BID [facility]


   Last Admin: 03/26/17 17:03 Dose:  100 mg


Enalapril Maleate (Vasotec)  10 mg PO DAILY [facility]


   Last Admin: 03/26/17 08:45 Dose:  Not Given


Famotidine (Pepcid)  20 mg PO BID [facility]


   Last Admin: 03/26/17 17:04 Dose:  20 mg


Fenofibrate (Tricor)  145 mg PO DAILY [facility]


   Last Admin: 03/26/17 08:44 Dose:  145 mg


Ibuprofen (Motrin Tab)  600 mg PO Q8 PRN


   PRN Reason: Pain, moderate (4-7)


   Last Admin: 01/06/17 16:32 Dose:  600 mg


Metoprolol Tartrate (Lopressor)  50 mg PO Q12 [facility]


   Last Admin: 03/26/17 21:20 Dose:  50 mg


Quetiapine Fumarate (Seroquel)  25 mg PO HS [facility]


   Last Admin: 03/26/17 21:21 Dose:  25 mg











- Labs


Labs: 


 





 03/23/17 05:45 





 03/23/17 05:45 





 











PT  11.2 SECONDS (9.4-12.0)   12/14/15  05:20    


 


INR  1.05  (0.89-1.20)   12/14/15  05:20    


 


APTT  36.2 SECONDS (23.1-32.0)  H  12/14/15  05:20    














- Constitutional


Appears: Well, Non-toxic, No Acute Distress





- Head Exam


Head Exam: ATRAUMATIC, NORMAL INSPECTION, NORMOCEPHALIC





- Eye Exam


Eye Exam: EOMI, Normal appearance, PERRL


Pupil Exam: NORMAL ACCOMODATION, PERRL





- ENT Exam


ENT Exam: Mucous Membranes Moist, Normal Exam





- Neck Exam


Neck Exam: Full ROM, Normal Inspection.  absent: Lymphadenopathy





- Respiratory Exam


Respiratory Exam: Clear to Ausculation Bilateral, NORMAL BREATHING PATTERN





- Cardiovascular Exam


Cardiovascular Exam: REGULAR RHYTHM, +S1, +S2.  absent: Murmur





- GI/Abdominal Exam


GI & Abdominal Exam: Soft, Normal Bowel Sounds.  absent: Tenderness





- Extremities Exam


Extremities Exam: Full ROM, Normal Capillary Refill, Normal Inspection.  absent

: Joint Swelling, Pedal Edema





- Back Exam


Back Exam: NORMAL INSPECTION





- Neurological Exam


Neurological Exam: Alert, Awake, CN II-XII Intact, Normal Gait, Oriented x3





- Psychiatric Exam


Psychiatric exam: Normal Affect, Normal Mood





- Skin


Skin Exam: Dry, Intact, Normal Color, Warm





Assessment and Plan


(1) DVT prophylaxis


Status: Chronic





(2) CAD (coronary artery disease)


Status: Chronic





(3) Smoking


Status: Chronic





(4) Low back pain


Status: Chronic





(5) Scrotal edema


Status: Chronic





(6) Prediabetes


Status: Chronic





(7) Neurocognitive disorder


Status: Chronic








- Assessment and Plan (Free Text)


Assessment: 


cont meds, intervetnions


cont placement, sw


cont safety

## 2017-03-28 RX ADMIN — DIVALPROEX SODIUM SCH MG: 500 TABLET, DELAYED RELEASE ORAL at 16:26

## 2017-03-28 RX ADMIN — DIVALPROEX SODIUM SCH MG: 500 TABLET, DELAYED RELEASE ORAL at 08:37

## 2017-03-28 NOTE — CP.PCM.PN
Subjective





- Date & Time of Evaluation


Date of Evaluation: 03/28/17


Time of Evaluation: 08:12





- Subjective


Subjective: 


no complaints/distress


no f/.c, n/v/d


pending placement





Objective





- Vital Signs/Intake and Output


Vital Signs (last 24 hours): 


 











Temp Pulse Resp BP Pulse Ox


 


 99.0 F   73   20   109/71   98 


 


 03/27/17 21:05  03/27/17 22:09  03/27/17 21:05  03/27/17 22:09  03/27/17 21:05











- Medications


Medications: 


 Current Medications





Aspirin (Ecotrin)  81 mg PO DAILY [facility]


   Last Admin: 03/27/17 08:41 Dose:  81 mg


Atorvastatin Calcium (Lipitor)  20 mg PO HS [facility]


   Last Admin: 03/27/17 22:08 Dose:  20 mg


Divalproex Sodium (Depakote Dr(*Bid*))  500 mg PO BID [facility]


   Last Admin: 03/27/17 16:25 Dose:  500 mg


Docusate Sodium (Colace)  100 mg PO BID [facility]


   Last Admin: 03/27/17 16:25 Dose:  100 mg


Enalapril Maleate (Vasotec)  10 mg PO DAILY [facility]


   Last Admin: 03/27/17 16:26 Dose:  10 mg


Famotidine (Pepcid)  20 mg PO BID [facility]


   Last Admin: 03/27/17 16:25 Dose:  20 mg


Fenofibrate (Tricor)  145 mg PO DAILY [facility]


   Last Admin: 03/27/17 08:41 Dose:  145 mg


Ibuprofen (Motrin Tab)  600 mg PO Q8 PRN


   PRN Reason: Pain, moderate (4-7)


   Last Admin: 01/06/17 16:32 Dose:  600 mg


Metoprolol Tartrate (Lopressor)  50 mg PO Q12 [facility]


   Last Admin: 03/27/17 22:09 Dose:  50 mg


Quetiapine Fumarate (Seroquel)  25 mg PO HS [facility]


   Last Admin: 03/27/17 22:08 Dose:  25 mg











- Labs


Labs: 


 





 03/23/17 05:45 





 03/23/17 05:45 





 











PT  11.2 SECONDS (9.4-12.0)   12/14/15  05:20    


 


INR  1.05  (0.89-1.20)   12/14/15  05:20    


 


APTT  36.2 SECONDS (23.1-32.0)  H  12/14/15  05:20    














- Constitutional


Appears: Well, Non-toxic, No Acute Distress





- Head Exam


Head Exam: ATRAUMATIC, NORMAL INSPECTION, NORMOCEPHALIC





- Eye Exam


Eye Exam: EOMI, Normal appearance, PERRL


Pupil Exam: NORMAL ACCOMODATION, PERRL





- ENT Exam


ENT Exam: Mucous Membranes Moist, Normal Exam





- Neck Exam


Neck Exam: Full ROM, Normal Inspection.  absent: Lymphadenopathy





- Respiratory Exam


Respiratory Exam: Clear to Ausculation Bilateral, NORMAL BREATHING PATTERN





- Cardiovascular Exam


Cardiovascular Exam: REGULAR RHYTHM, RRR, +S1, +S2.  absent: Murmur





- GI/Abdominal Exam


GI & Abdominal Exam: Soft, Normal Bowel Sounds.  absent: Tenderness





- Extremities Exam


Extremities Exam: Full ROM, Normal Capillary Refill, Normal Inspection.  absent

: Joint Swelling, Pedal Edema





- Back Exam


Back Exam: NORMAL INSPECTION





- Neurological Exam


Neurological Exam: Alert, Awake, CN II-XII Intact, Normal Gait, Oriented x3





- Psychiatric Exam


Psychiatric exam: Normal Affect, Normal Mood





- Skin


Skin Exam: Dry, Intact, Normal Color, Warm





Assessment and Plan


(1) DVT prophylaxis


Status: Chronic





(2) CAD (coronary artery disease)


Status: Chronic





(3) Smoking


Status: Chronic





(4) Low back pain


Status: Chronic





(5) Scrotal edema


Status: Chronic





(6) Prediabetes


Status: Chronic





(7) Neurocognitive disorder


Status: Chronic








- Assessment and Plan (Free Text)


Assessment: 


cont meds, sw interventions, placement

## 2017-03-29 RX ADMIN — DIVALPROEX SODIUM SCH MG: 500 TABLET, DELAYED RELEASE ORAL at 08:31

## 2017-03-29 RX ADMIN — DIVALPROEX SODIUM SCH MG: 500 TABLET, DELAYED RELEASE ORAL at 17:03

## 2017-03-29 NOTE — CP.PCM.PN
Subjective





- Date & Time of Evaluation


Date of Evaluation: 03/29/17


Time of Evaluation: 10:05





- Subjective


Subjective: 


no complaints. distress


nof c/, n/v/d


pending placement








Objective





- Vital Signs/Intake and Output


Vital Signs (last 24 hours): 


 











Temp Pulse Resp BP Pulse Ox


 


 98.4 F   68   20   101/68   98 


 


 03/29/17 07:50  03/29/17 08:32  03/29/17 07:50  03/29/17 08:32  03/29/17 07:50











- Medications


Medications: 


 Current Medications





Aspirin (Ecotrin)  81 mg PO DAILY [facility]


   Last Admin: 03/29/17 08:31 Dose:  81 mg


Atorvastatin Calcium (Lipitor)  20 mg PO HS [facility]


   Last Admin: 03/28/17 21:30 Dose:  20 mg


Divalproex Sodium (Depakote Dr(*Bid*))  500 mg PO BID [facility]


   Last Admin: 03/29/17 08:31 Dose:  500 mg


Docusate Sodium (Colace)  100 mg PO BID [facility]


   Last Admin: 03/29/17 08:32 Dose:  100 mg


Enalapril Maleate (Vasotec)  10 mg PO DAILY [facility]


   Last Admin: 03/29/17 08:38 Dose:  Not Given


Famotidine (Pepcid)  20 mg PO BID [facility]


   Last Admin: 03/29/17 08:31 Dose:  20 mg


Fenofibrate (Tricor)  145 mg PO DAILY [facility]


   Last Admin: 03/29/17 08:36 Dose:  145 mg


Ibuprofen (Motrin Tab)  600 mg PO Q8 PRN


   PRN Reason: Pain, moderate (4-7)


   Last Admin: 01/06/17 16:32 Dose:  600 mg


Metoprolol Tartrate (Lopressor)  50 mg PO Q12 [facility]


   Last Admin: 03/29/17 08:32 Dose:  Not Given


Quetiapine Fumarate (Seroquel)  25 mg PO HS [facility]


   Last Admin: 03/28/17 21:30 Dose:  25 mg











- Labs


Labs: 


 





 03/23/17 05:45 





 03/23/17 05:45 





 











PT  11.2 SECONDS (9.4-12.0)   12/14/15  05:20    


 


INR  1.05  (0.89-1.20)   12/14/15  05:20    


 


APTT  36.2 SECONDS (23.1-32.0)  H  12/14/15  05:20    














- Constitutional


Appears: Well, Non-toxic, No Acute Distress





- Head Exam


Head Exam: ATRAUMATIC, NORMAL INSPECTION, NORMOCEPHALIC





- Eye Exam


Eye Exam: EOMI, Normal appearance, PERRL


Pupil Exam: NORMAL ACCOMODATION, PERRL





- ENT Exam


ENT Exam: Mucous Membranes Moist, Normal Exam





- Neck Exam


Neck Exam: Full ROM, Normal Inspection.  absent: Lymphadenopathy





- Respiratory Exam


Respiratory Exam: Clear to Ausculation Bilateral, NORMAL BREATHING PATTERN





- Cardiovascular Exam


Cardiovascular Exam: REGULAR RHYTHM, RRR, +S1, +S2.  absent: Murmur





- GI/Abdominal Exam


GI & Abdominal Exam: Soft, Normal Bowel Sounds.  absent: Tenderness





- Rectal Exam


Rectal Exam: NORMAL INSPECTION





- Extremities Exam


Extremities Exam: Full ROM, Normal Capillary Refill, Normal Inspection.  absent

: Joint Swelling, Pedal Edema





- Back Exam


Back Exam: NORMAL INSPECTION





- Neurological Exam


Neurological Exam: Alert, Awake, CN II-XII Intact, Normal Gait, Oriented x3





- Psychiatric Exam


Psychiatric exam: Normal Affect, Normal Mood





- Skin


Skin Exam: Dry, Intact, Normal Color, Warm





Assessment and Plan


(1) DVT prophylaxis


Status: Chronic





(2) CAD (coronary artery disease)


Status: Chronic





(3) Smoking


Status: Chronic





(4) Low back pain


Status: Chronic





(5) Scrotal edema


Status: Chronic





(6) Prediabetes


Status: Chronic





(7) Neurocognitive disorder


Status: Chronic








- Assessment and Plan (Free Text)


Assessment: 


ont meds, interventions, sw, safety

## 2017-03-30 RX ADMIN — DIVALPROEX SODIUM SCH MG: 500 TABLET, DELAYED RELEASE ORAL at 16:13

## 2017-03-30 RX ADMIN — DIVALPROEX SODIUM SCH MG: 500 TABLET, DELAYED RELEASE ORAL at 10:34

## 2017-03-30 NOTE — CP.PCM.PN
Subjective





- Date & Time of Evaluation


Date of Evaluation: 03/30/17


Time of Evaluation: 07:04





- Subjective


Subjective: 


no complains/distress


no f/c, n/v/d


pending placement





Objective





- Vital Signs/Intake and Output


Vital Signs (last 24 hours): 


 











Temp Pulse Resp BP Pulse Ox


 


 98.3 F   76   18   111/75   99 


 


 03/29/17 21:45  03/29/17 21:45  03/29/17 21:45  03/29/17 21:45  03/29/17 21:45











- Medications


Medications: 


 Current Medications





Aspirin (Ecotrin)  81 mg PO DAILY [facility]


   Last Admin: 03/29/17 08:31 Dose:  81 mg


Atorvastatin Calcium (Lipitor)  20 mg PO HS [facility]


   Last Admin: 03/29/17 21:31 Dose:  20 mg


Divalproex Sodium (Depakote Dr(*Bid*))  500 mg PO BID [facility]


   Last Admin: 03/29/17 17:03 Dose:  500 mg


Docusate Sodium (Colace)  100 mg PO BID [facility]


   Last Admin: 03/29/17 17:03 Dose:  100 mg


Enalapril Maleate (Vasotec)  10 mg PO DAILY [facility]


   Last Admin: 03/29/17 08:38 Dose:  Not Given


Famotidine (Pepcid)  20 mg PO BID [facility]


   Last Admin: 03/29/17 17:03 Dose:  20 mg


Fenofibrate (Tricor)  145 mg PO DAILY [facility]


   Last Admin: 03/29/17 08:36 Dose:  145 mg


Ibuprofen (Motrin Tab)  600 mg PO Q8 PRN


   PRN Reason: Pain, moderate (4-7)


   Last Admin: 01/06/17 16:32 Dose:  600 mg


Metoprolol Tartrate (Lopressor)  50 mg PO Q12 [facility]


   Last Admin: 03/29/17 21:31 Dose:  50 mg


Quetiapine Fumarate (Seroquel)  25 mg PO HS [facility]


   Last Admin: 03/29/17 21:31 Dose:  25 mg











- Labs


Labs: 


 





 03/23/17 05:45 





 03/23/17 05:45 





 











PT  11.2 SECONDS (9.4-12.0)   12/14/15  05:20    


 


INR  1.05  (0.89-1.20)   12/14/15  05:20    


 


APTT  36.2 SECONDS (23.1-32.0)  H  12/14/15  05:20    














- Constitutional


Appears: Well, Non-toxic, No Acute Distress





- Head Exam


Head Exam: ATRAUMATIC, NORMAL INSPECTION, NORMOCEPHALIC





- Eye Exam


Eye Exam: EOMI, Normal appearance, PERRL


Pupil Exam: NORMAL ACCOMODATION, PERRL





- ENT Exam


ENT Exam: Mucous Membranes Moist, Normal Exam





- Neck Exam


Neck Exam: Full ROM, Normal Inspection.  absent: Lymphadenopathy





- Respiratory Exam


Respiratory Exam: Clear to Ausculation Bilateral, NORMAL BREATHING PATTERN





- Cardiovascular Exam


Cardiovascular Exam: REGULAR RHYTHM, +S1, +S2.  absent: Murmur





- GI/Abdominal Exam


GI & Abdominal Exam: Soft, Normal Bowel Sounds.  absent: Tenderness





- Extremities Exam


Extremities Exam: Full ROM, Normal Capillary Refill, Normal Inspection.  absent

: Joint Swelling, Pedal Edema





- Back Exam


Back Exam: NORMAL INSPECTION





- Neurological Exam


Neurological Exam: Alert, Awake, CN II-XII Intact, Normal Gait, Oriented x3





- Psychiatric Exam


Psychiatric exam: Normal Affect, Normal Mood





- Skin


Skin Exam: Dry, Intact, Normal Color, Warm





Assessment and Plan


(1) DVT prophylaxis


Status: Chronic





(2) CAD (coronary artery disease)


Status: Chronic





(3) Smoking


Status: Chronic





(4) Low back pain


Status: Chronic





(5) Scrotal edema


Status: Chronic





(6) Prediabetes


Status: Chronic





(7) Neurocognitive disorder


Status: Chronic








- Assessment and Plan (Free Text)


Assessment: 


cont meds, ss, safety


placement

## 2017-03-31 RX ADMIN — DIVALPROEX SODIUM SCH MG: 500 TABLET, DELAYED RELEASE ORAL at 08:35

## 2017-03-31 RX ADMIN — DIVALPROEX SODIUM SCH MG: 500 TABLET, DELAYED RELEASE ORAL at 18:31

## 2017-04-01 RX ADMIN — DIVALPROEX SODIUM SCH MG: 500 TABLET, DELAYED RELEASE ORAL at 09:47

## 2017-04-01 RX ADMIN — DIVALPROEX SODIUM SCH MG: 500 TABLET, DELAYED RELEASE ORAL at 18:28

## 2017-04-01 NOTE — CP.PCM.PN
Subjective





- Date & Time of Evaluation


Date of Evaluation: 04/01/17


Time of Evaluation: 07:37





- Subjective


Subjective: 


no complaints/distress


nof /c, n/v/d


pneidng palcement





Objective





- Vital Signs/Intake and Output


Vital Signs (last 24 hours): 


 











Temp Pulse Resp BP Pulse Ox


 


 98.5 F   74   20   115/74   97 


 


 03/31/17 16:23  03/31/17 21:50  03/31/17 16:23  03/31/17 21:50  03/31/17 16:23











- Medications


Medications: 


 Current Medications





Aspirin (Ecotrin)  81 mg PO DAILY [facility]


   Last Admin: 03/31/17 08:36 Dose:  81 mg


Atorvastatin Calcium (Lipitor)  20 mg PO HS [facility]


   Last Admin: 03/31/17 21:50 Dose:  20 mg


Divalproex Sodium (Depakote Dr(*Bid*))  500 mg PO BID [facility]


   Last Admin: 03/31/17 18:31 Dose:  500 mg


Docusate Sodium (Colace)  100 mg PO BID [facility]


   Last Admin: 03/31/17 18:31 Dose:  100 mg


Enalapril Maleate (Vasotec)  10 mg PO DAILY [facility]


   Last Admin: 03/31/17 08:36 Dose:  10 mg


Famotidine (Pepcid)  20 mg PO BID [facility]


   Last Admin: 03/31/17 18:32 Dose:  20 mg


Fenofibrate (Tricor)  145 mg PO DAILY [facility]


   Last Admin: 03/31/17 08:35 Dose:  145 mg


Metoprolol Tartrate (Lopressor)  50 mg PO Q12 [facility]


   Last Admin: 03/31/17 21:50 Dose:  50 mg


Quetiapine Fumarate (Seroquel)  25 mg PO HS [facility]


   Last Admin: 03/31/17 21:50 Dose:  25 mg











- Labs


Labs: 


 





 03/23/17 05:45 





 03/23/17 05:45 





 











PT  11.2 SECONDS (9.4-12.0)   12/14/15  05:20    


 


INR  1.05  (0.89-1.20)   12/14/15  05:20    


 


APTT  36.2 SECONDS (23.1-32.0)  H  12/14/15  05:20    














- Constitutional


Appears: Well, Non-toxic, No Acute Distress





- Head Exam


Head Exam: ATRAUMATIC, NORMAL INSPECTION, NORMOCEPHALIC





- Eye Exam


Eye Exam: EOMI, Normal appearance, PERRL


Pupil Exam: NORMAL ACCOMODATION, PERRL





- ENT Exam


ENT Exam: Mucous Membranes Moist, Normal Exam





- Neck Exam


Neck Exam: Full ROM, Normal Inspection.  absent: Lymphadenopathy





- Respiratory Exam


Respiratory Exam: Clear to Ausculation Bilateral, NORMAL BREATHING PATTERN





- Cardiovascular Exam


Cardiovascular Exam: REGULAR RHYTHM, +S1, +S2.  absent: Murmur





- GI/Abdominal Exam


GI & Abdominal Exam: Soft, Normal Bowel Sounds.  absent: Tenderness





- Extremities Exam


Extremities Exam: Full ROM, Normal Capillary Refill, Normal Inspection.  absent

: Joint Swelling, Pedal Edema





- Back Exam


Back Exam: NORMAL INSPECTION





- Neurological Exam


Neurological Exam: Alert, Awake, CN II-XII Intact, Normal Gait, Oriented x3





- Psychiatric Exam


Psychiatric exam: Normal Affect, Normal Mood





- Skin


Skin Exam: Dry, Intact, Normal Color, Warm





Assessment and Plan


(1) DVT prophylaxis


Status: Chronic





(2) CAD (coronary artery disease)


Status: Chronic





(3) Smoking


Status: Chronic





(4) Low back pain


Status: Chronic





(5) Scrotal edema


Status: Chronic





(6) Prediabetes


Status: Chronic





(7) Neurocognitive disorder


Status: Chronic








- Assessment and Plan (Free Text)


Assessment: 


cont med sw interventions


pending palcement

## 2017-04-02 RX ADMIN — DIVALPROEX SODIUM SCH MG: 500 TABLET, DELAYED RELEASE ORAL at 10:12

## 2017-04-02 RX ADMIN — DIVALPROEX SODIUM SCH MG: 500 TABLET, DELAYED RELEASE ORAL at 17:39

## 2017-04-03 RX ADMIN — DIVALPROEX SODIUM SCH MG: 500 TABLET, DELAYED RELEASE ORAL at 10:04

## 2017-04-03 RX ADMIN — DIVALPROEX SODIUM SCH MG: 500 TABLET, DELAYED RELEASE ORAL at 16:54

## 2017-04-03 NOTE — CP.PCM.PN
Subjective





- Date & Time of Evaluation


Date of Evaluation: 04/03/17


Time of Evaluation: 07:30





- Subjective


Subjective: 


no complaints/distress


no f/c, n/v/d


pending palcement





Objective





- Vital Signs/Intake and Output


Vital Signs (last 24 hours): 


 











Temp Pulse Resp BP Pulse Ox


 


 98.4 F   73   19   122/76   100 


 


 04/02/17 16:05  04/02/17 21:15  04/02/17 16:05  04/02/17 21:15  04/02/17 16:05











- Medications


Medications: 


 Current Medications





Aspirin (Ecotrin)  81 mg PO DAILY [facility]


   Last Admin: 04/02/17 10:12 Dose:  81 mg


Atorvastatin Calcium (Lipitor)  20 mg PO HS [facility]


   Last Admin: 04/02/17 21:15 Dose:  20 mg


Divalproex Sodium (Depakote Dr(*Bid*))  500 mg PO BID [facility]


   Last Admin: 04/02/17 17:39 Dose:  500 mg


Docusate Sodium (Colace)  100 mg PO BID [facility]


   Last Admin: 04/02/17 17:39 Dose:  100 mg


Enalapril Maleate (Vasotec)  10 mg PO DAILY [facility]


   Last Admin: 04/02/17 10:13 Dose:  10 mg


Famotidine (Pepcid)  20 mg PO BID [facility]


   Last Admin: 04/02/17 17:40 Dose:  20 mg


Fenofibrate (Tricor)  145 mg PO DAILY [facility]


   Last Admin: 04/02/17 10:13 Dose:  145 mg


Metoprolol Tartrate (Lopressor)  50 mg PO Q12 [facility]


   Last Admin: 04/02/17 21:15 Dose:  50 mg


Quetiapine Fumarate (Seroquel)  25 mg PO HS [facility]


   Last Admin: 04/02/17 21:15 Dose:  25 mg











- Labs


Labs: 


 





 03/23/17 05:45 





 03/23/17 05:45 





 











PT  11.2 SECONDS (9.4-12.0)   12/14/15  05:20    


 


INR  1.05  (0.89-1.20)   12/14/15  05:20    


 


APTT  36.2 SECONDS (23.1-32.0)  H  12/14/15  05:20    














- Constitutional


Appears: Well, Non-toxic, No Acute Distress





- Head Exam


Head Exam: ATRAUMATIC, NORMAL INSPECTION, NORMOCEPHALIC





- Eye Exam


Eye Exam: EOMI, Normal appearance, PERRL


Pupil Exam: NORMAL ACCOMODATION, PERRL





- ENT Exam


ENT Exam: Mucous Membranes Moist, Normal Exam





- Neck Exam


Neck Exam: Full ROM, Normal Inspection.  absent: Lymphadenopathy





- Respiratory Exam


Respiratory Exam: Clear to Ausculation Bilateral, NORMAL BREATHING PATTERN





- Cardiovascular Exam


Cardiovascular Exam: REGULAR RHYTHM, RRR, +S1, +S2.  absent: Murmur





- GI/Abdominal Exam


GI & Abdominal Exam: Soft, Normal Bowel Sounds.  absent: Tenderness





- Extremities Exam


Extremities Exam: Full ROM, Normal Capillary Refill, Normal Inspection.  absent

: Joint Swelling, Pedal Edema





- Back Exam


Back Exam: NORMAL INSPECTION





- Neurological Exam


Neurological Exam: Alert, Awake, CN II-XII Intact, Normal Gait, Oriented x3





- Psychiatric Exam


Psychiatric exam: Normal Affect, Normal Mood





- Skin


Skin Exam: Dry, Intact, Normal Color, Warm





Assessment and Plan


(1) DVT prophylaxis


Status: Chronic





(2) CAD (coronary artery disease)


Status: Chronic





(3) Smoking


Status: Chronic





(4) Low back pain


Status: Chronic





(5) Scrotal edema


Status: Chronic





(6) Prediabetes


Status: Chronic





(7) Neurocognitive disorder


Status: Chronic








- Assessment and Plan (Free Text)


Assessment: 


cont meds, interventions, sw, placement

## 2017-04-04 RX ADMIN — DIVALPROEX SODIUM SCH MG: 500 TABLET, DELAYED RELEASE ORAL at 16:18

## 2017-04-04 RX ADMIN — DIVALPROEX SODIUM SCH MG: 500 TABLET, DELAYED RELEASE ORAL at 08:14

## 2017-04-04 NOTE — CP.PCM.PN
Subjective





- Date & Time of Evaluation


Date of Evaluation: 04/04/17


Time of Evaluation: 07:35





- Subjective


Subjective: 


no complaints/distress


nof/c, nv//d


pending palcement





Objective





- Vital Signs/Intake and Output


Vital Signs (last 24 hours): 


 











Temp Pulse Resp BP Pulse Ox


 


 97.6 F   65   18   117/75   97 


 


 04/03/17 21:54  04/03/17 21:54  04/03/17 21:54  04/03/17 21:54  04/03/17 21:54











- Medications


Medications: 


 Current Medications





Aspirin (Ecotrin)  81 mg PO DAILY [facility]


   Last Admin: 04/03/17 10:03 Dose:  81 mg


Atorvastatin Calcium (Lipitor)  20 mg PO HS [facility]


   Last Admin: 04/03/17 21:02 Dose:  20 mg


Divalproex Sodium (Depakote Dr(*Bid*))  500 mg PO BID [facility]


   Last Admin: 04/03/17 16:54 Dose:  500 mg


Docusate Sodium (Colace)  100 mg PO BID [facility]


   Last Admin: 04/03/17 16:54 Dose:  100 mg


Enalapril Maleate (Vasotec)  10 mg PO DAILY [facility]


   Last Admin: 04/03/17 10:07 Dose:  10 mg


Famotidine (Pepcid)  20 mg PO BID [facility]


   Last Admin: 04/03/17 16:54 Dose:  20 mg


Fenofibrate (Tricor)  145 mg PO DAILY [facility]


   Last Admin: 04/03/17 10:07 Dose:  145 mg


Metoprolol Tartrate (Lopressor)  50 mg PO Q12 [facility]


   Last Admin: 04/03/17 21:02 Dose:  50 mg


Quetiapine Fumarate (Seroquel)  25 mg PO HS [facility]


   Last Admin: 04/03/17 21:02 Dose:  25 mg











- Labs


Labs: 


 





 03/23/17 05:45 





 03/23/17 05:45 





 











PT  11.2 SECONDS (9.4-12.0)   12/14/15  05:20    


 


INR  1.05  (0.89-1.20)   12/14/15  05:20    


 


APTT  36.2 SECONDS (23.1-32.0)  H  12/14/15  05:20    














- Constitutional


Appears: Well, Non-toxic, No Acute Distress





- Head Exam


Head Exam: ATRAUMATIC, NORMAL INSPECTION, NORMOCEPHALIC





- Eye Exam


Eye Exam: EOMI, Normal appearance, PERRL


Pupil Exam: NORMAL ACCOMODATION, PERRL





- ENT Exam


ENT Exam: Mucous Membranes Moist, Normal Exam





- Neck Exam


Neck Exam: Full ROM, Normal Inspection.  absent: Lymphadenopathy





- Respiratory Exam


Respiratory Exam: Clear to Ausculation Bilateral, NORMAL BREATHING PATTERN





- Cardiovascular Exam


Cardiovascular Exam: REGULAR RHYTHM, +S1, +S2.  absent: Murmur





- GI/Abdominal Exam


GI & Abdominal Exam: Soft, Normal Bowel Sounds.  absent: Tenderness





- Extremities Exam


Extremities Exam: Full ROM, Normal Capillary Refill, Normal Inspection.  absent

: Joint Swelling, Pedal Edema





- Back Exam


Back Exam: NORMAL INSPECTION





- Neurological Exam


Neurological Exam: Alert, Awake, CN II-XII Intact, Normal Gait, Oriented x3





- Psychiatric Exam


Psychiatric exam: Normal Affect, Normal Mood





- Skin


Skin Exam: Dry, Intact, Normal Color, Warm





Assessment and Plan


(1) DVT prophylaxis


Status: Chronic





(2) CAD (coronary artery disease)


Status: Chronic





(3) Smoking


Status: Chronic





(4) Low back pain


Status: Chronic





(5) Scrotal edema


Status: Chronic





(6) Prediabetes


Status: Chronic





(7) Neurocognitive disorder


Status: Chronic








- Assessment and Plan (Free Text)


Assessment: 


cont meds, intervention, sw, placement, safety

## 2017-04-05 RX ADMIN — DIVALPROEX SODIUM SCH MG: 500 TABLET, DELAYED RELEASE ORAL at 08:29

## 2017-04-05 RX ADMIN — DIVALPROEX SODIUM SCH MG: 500 TABLET, DELAYED RELEASE ORAL at 16:04

## 2017-04-05 NOTE — CON
DATE: 04/05/2017



HISTORY OF PRESENT ILLNESS:  This is a 57-year-old man who has history of seizure disorder, history o
f some agitation, who came to the hospital initially on 12/18/2015 for status post cardiac arrest whi
le he was at [facility] and received CPR and the EMS arrived 15 minutes after initial event where he was 
found to be in pulseless V-tach with no pulses and came back to sinus tachycardia after 1 shock.  [place]
t was repeated twice in the ER.  He had some tachycardia, was unresponsive, agitated, was not followi
ng commands.  At that time, he had anion gap metabolic acidosis, elevated hematocrit and had question
able seizure-like activity.  He had elevated systolic and diastolic blood pressures.  The patient was
 stable, but had no purposeful movements and had undergone cooling and he is currently out of the ICU
.  I was called to evaluate his mental status.  Currently, the patient is alert, oriented to person a
nd place, not much of year.  Recall after 5 minutes is 0/3.  Poor attention span, slowed thought proc
ess.  No seizure activity, but he has had agitation.  He is currently being followed by psychiatrist.
  He was placed on Depakote 500 mg p.o. b.i.d. and gets Seroquel 25 mg p.o. at bedtime for agitation.
  He is moving all extremities, following all commands.  No focal muscle weakness in his extremities.
  He says he is in [facility], does not know the month or year.



PAST MEDICAL HISTORY:  History of hypertension, recent history of cardiac arrest, status post resusci
tation, V-tach, status post mild anoxic encephalopathy, history of seizure disorder.  



REVIEW OF SYSTEMS:  A 14-point review of systems negative except for the HPI.  His last CT head was o
n 12/8/2015 which at that time showed no acute intracranial abnormality, just features of his post ri
ght frontal craniotomy from the past.  No further seizures while he has been in the hospital, followi
ng commands.



PAST MEDICAL HISTORY:  Seizure disorder, status post right frontal craniotomy.  



FAMILY HISTORY:  Noncontributory.



SOCIAL HISTORY:  No illicit drug use, smoking, or ETOH abuse at this time.



ALLERGIES:  No known drug allergies.



CURRENT MEDICATIONS:  Reviewed via nurse's reconciliation sheet.  He is on aspirin, Lipitor, Depakote
, Vasotec, Pepcid, Tricor, Lopressor, Seroquel.



PHYSICAL EXAMINATION:

VITAL SIGNS:  Temperature 97.3, pulse rate of 63, blood pressure is 104/72, respiratory rate 20, oxyg
en saturation 98% via room air.

GENERAL:  The patient is sitting up in bed in no acute distress.

HEENT:  Atraumatic, normocephalic.  PERRLA.  Extraocular muscles intact.

NECK:  Supple, no JVD, no adenopathy noted.

LUNGS:  Clear to auscultation.  No adventitious sounds.

HEART:  S1, S2, normal rate and rhythm.  No murmurs, rubs, or gallops.

ABDOMEN:  Soft, nontender, nondistended.  Bowel sounds are present.

EXTREMITIES:  No clubbing, no cyanosis.  Peripheral pulses 2+ felt bilaterally.  

NEUROLOGIC:  The patient is alert, oriented to person and place and knows that he is in [facility], not much of the year or month.  Recall after 5 minutes is 0/3.  Poor attention span, 
slowed thought process.  Flat affect.  Cranial nerves II-XII are intact.

MOTOR:  Slight increased tone throughout.  Moves all extremities.  No pronator drift seen.

SENSORY:  Light touch, pinprick, proprioception, vibration is intact.  DTRs are 2+ throughout and 1 a
t the ankles.

COORDINATION:  Finger-to-nose intact.  No dysmetria noted.  There to no evidence of any ____ or any a
ppendicular dysmetria as well.

GAIT:  Deferred for now.



LABORATORY DATA:  No new labs have been done since 3/29/2017 where his blood sugar was at 118.



ASSESSMENT AND PLAN:  This is a 57-year-old man with history of seizure disorder, history of right fr
ontal craniotomy, who is status post cardiac arrest x 2 and had a questionable seizure at that time a
nd had a mild anoxic encephalopathy.  He currently has recovered through his intense medical manageme
nt while he has been in the hospital.  He does have some underlying cognitive impairment.  No further
 seizures at this point.  He has had episodes of delirium while he was in the hospital and agitation 
which he is on Seroquel.  Psychiatry has reviewed the patient and has adjusted his medications.  He i
s currently on Depakote 500 mg p.o. b.i.d. for his behavior as well as for seizure prophylaxis.  CT h
ead showed no acute intracranial abnormality, just right frontal craniotomy.  At this time, I feel li
ke he does have some mild cognitive impairment from his recent mild hypoxic ischemic injury to the br
ain from a cardiac arrest, but overall he is doing much better in terms of there is no more weakness.
  At this time, recommend:

1.  Physical and occupational therapy to where he can be active for his ADLs.

2.  Aspirin 81 mg and Lipitor 20 mg for stroke prevention.

3.  Depakote 500 mg p.o. b.i.d. for his fluctuating bipolar behavior as well as seizure prophylaxis.

4.  Continue with Tricor 145 mg p.o. daily.

5.  Keep his systolic blood pressure between 120-130 mmHg.

6.  Seroquel 25 mg p.o. at bedtime for agitation.  He is clinically stable from my standpoint.  [place] stone with current present medical management.



Thank you for this consult.  You can print this consultation for your records.





__________________________________________

Ronnell Youssef MD







cc:



DD: 04/05/2017 14:05:55  [id number]

TT: 04/05/2017 14:46:06

Confirmation # [id number]

Dictation # [id number]

tn

## 2017-04-05 NOTE — CP.PCM.PN
Subjective





- Date & Time of Evaluation


Date of Evaluation: 04/05/17


Time of Evaluation: 07:29





- Subjective


Subjective: 


no complaints/distress


no f/c, n/v/d


pendin gplacement





Objective





- Vital Signs/Intake and Output


Vital Signs (last 24 hours): 


 











Temp Pulse Resp BP Pulse Ox


 


 98.2 F   69   20   117/76   98 


 


 04/04/17 21:49  04/04/17 21:49  04/04/17 21:49  04/04/17 21:49  04/04/17 21:49











- Medications


Medications: 


 Current Medications





Aspirin (Ecotrin)  81 mg PO DAILY [facility]


   Last Admin: 04/04/17 08:14 Dose:  81 mg


Atorvastatin Calcium (Lipitor)  20 mg PO HS [facility]


   Last Admin: 04/04/17 21:48 Dose:  20 mg


Divalproex Sodium (Depakote Dr(*Bid*))  500 mg PO BID [facility]


   Last Admin: 04/04/17 16:18 Dose:  500 mg


Docusate Sodium (Colace)  100 mg PO BID [facility]


   Last Admin: 04/04/17 16:18 Dose:  100 mg


Enalapril Maleate (Vasotec)  10 mg PO DAILY [facility]


   Last Admin: 04/04/17 08:15 Dose:  10 mg


Famotidine (Pepcid)  20 mg PO BID [facility]


   Last Admin: 04/04/17 16:18 Dose:  20 mg


Fenofibrate (Tricor)  145 mg PO DAILY [facility]


   Last Admin: 04/04/17 08:14 Dose:  145 mg


Metoprolol Tartrate (Lopressor)  50 mg PO Q12 [facility]


   Last Admin: 04/04/17 21:45 Dose:  50 mg


Quetiapine Fumarate (Seroquel)  25 mg PO HS [facility]


   Last Admin: 04/04/17 21:48 Dose:  25 mg











- Labs


Labs: 


 





 03/23/17 05:45 





 03/23/17 05:45 





 











PT  11.2 SECONDS (9.4-12.0)   12/14/15  05:20    


 


INR  1.05  (0.89-1.20)   12/14/15  05:20    


 


APTT  36.2 SECONDS (23.1-32.0)  H  12/14/15  05:20    














- Constitutional


Appears: Well, Non-toxic, No Acute Distress





- Head Exam


Head Exam: ATRAUMATIC, NORMAL INSPECTION, NORMOCEPHALIC





- Eye Exam


Eye Exam: EOMI, Normal appearance, PERRL


Pupil Exam: NORMAL ACCOMODATION, PERRL





- ENT Exam


ENT Exam: Mucous Membranes Moist, Normal Exam





- Neck Exam


Neck Exam: Full ROM, Normal Inspection.  absent: Lymphadenopathy





- Respiratory Exam


Respiratory Exam: Clear to Ausculation Bilateral, NORMAL BREATHING PATTERN





- Cardiovascular Exam


Cardiovascular Exam: REGULAR RHYTHM, +S1, +S2.  absent: Murmur





- GI/Abdominal Exam


GI & Abdominal Exam: Soft, Normal Bowel Sounds.  absent: Tenderness





- Extremities Exam


Extremities Exam: Full ROM, Normal Capillary Refill, Normal Inspection.  absent

: Joint Swelling, Pedal Edema





- Back Exam


Back Exam: NORMAL INSPECTION





- Neurological Exam


Neurological Exam: Alert, Awake, CN II-XII Intact, Normal Gait, Oriented x3





- Psychiatric Exam


Psychiatric exam: Normal Affect, Normal Mood





- Skin


Skin Exam: Dry, Intact, Normal Color, Warm





Assessment and Plan


(1) DVT prophylaxis


Status: Chronic





(2) CAD (coronary artery disease)


Status: Chronic





(3) Smoking


Status: Chronic





(4) Low back pain


Status: Chronic





(5) Scrotal edema


Status: Chronic





(6) Prediabetes


Status: Chronic





(7) Neurocognitive disorder


Status: Chronic








- Assessment and Plan (Free Text)


Assessment: 


cont meds, safety, sw, palcement

## 2017-04-06 RX ADMIN — DIVALPROEX SODIUM SCH MG: 500 TABLET, DELAYED RELEASE ORAL at 18:07

## 2017-04-06 RX ADMIN — DIVALPROEX SODIUM SCH MG: 500 TABLET, DELAYED RELEASE ORAL at 09:47

## 2017-04-06 NOTE — CP.PCM.PN
Subjective





- Date & Time of Evaluation


Date of Evaluation: 04/06/17


Time of Evaluation: 07:48





- Subjective


Subjective: 


no complaints/distress


no f/c, n/v/d


pending placement





Objective





- Vital Signs/Intake and Output


Vital Signs (last 24 hours): 


 











Temp Pulse Resp BP Pulse Ox


 


 99.2 F   77   20   114/74   98 


 


 04/05/17 21:08  04/05/17 21:32  04/05/17 21:08  04/05/17 21:32  04/05/17 21:08











- Medications


Medications: 


 Current Medications





Aspirin (Ecotrin)  81 mg PO DAILY [facility]


   Last Admin: 04/05/17 08:30 Dose:  81 mg


Atorvastatin Calcium (Lipitor)  20 mg PO HS [facility]


   Last Admin: 04/05/17 21:34 Dose:  20 mg


Divalproex Sodium (Depakote Dr(*Bid*))  500 mg PO BID [facility]


   Last Admin: 04/05/17 16:04 Dose:  500 mg


Enalapril Maleate (Vasotec)  10 mg PO DAILY [facility]


   Last Admin: 04/05/17 08:30 Dose:  10 mg


Famotidine (Pepcid)  20 mg PO BID [facility]


   Last Admin: 04/05/17 16:04 Dose:  20 mg


Fenofibrate (Tricor)  145 mg PO DAILY [facility]


   Last Admin: 04/05/17 08:30 Dose:  145 mg


Metoprolol Tartrate (Lopressor)  50 mg PO Q12 [facility]


   Last Admin: 04/05/17 21:32 Dose:  50 mg


Quetiapine Fumarate (Seroquel)  25 mg PO HS [facility]


   Last Admin: 04/05/17 21:35 Dose:  25 mg











- Labs


Labs: 


 





 03/23/17 05:45 





 03/23/17 05:45 





 











PT  11.2 SECONDS (9.4-12.0)   12/14/15  05:20    


 


INR  1.05  (0.89-1.20)   12/14/15  05:20    


 


APTT  36.2 SECONDS (23.1-32.0)  H  12/14/15  05:20    














- Constitutional


Appears: Well, Non-toxic, No Acute Distress





- Head Exam


Head Exam: ATRAUMATIC, NORMAL INSPECTION, NORMOCEPHALIC





- Eye Exam


Eye Exam: EOMI, Normal appearance, PERRL


Pupil Exam: NORMAL ACCOMODATION, PERRL





- ENT Exam


ENT Exam: Mucous Membranes Moist, Normal Exam





- Neck Exam


Neck Exam: Full ROM, Normal Inspection.  absent: Lymphadenopathy





- Respiratory Exam


Respiratory Exam: Clear to Ausculation Bilateral, NORMAL BREATHING PATTERN





- Cardiovascular Exam


Cardiovascular Exam: REGULAR RHYTHM, +S1, +S2.  absent: Murmur





- GI/Abdominal Exam


GI & Abdominal Exam: Soft, Normal Bowel Sounds.  absent: Tenderness





- Extremities Exam


Extremities Exam: Full ROM, Normal Capillary Refill, Normal Inspection.  absent

: Joint Swelling, Pedal Edema





- Back Exam


Back Exam: NORMAL INSPECTION





- Neurological Exam


Neurological Exam: Alert, Awake, CN II-XII Intact, Normal Gait, Oriented x3





- Psychiatric Exam


Psychiatric exam: Normal Affect, Normal Mood





- Skin


Skin Exam: Dry, Intact, Normal Color, Rash, Warm





Assessment and Plan


(1) DVT prophylaxis


Status: Chronic





(2) CAD (coronary artery disease)


Status: Chronic





(3) Smoking


Status: Chronic





(4) Low back pain


Status: Chronic





(5) Scrotal edema


Status: Chronic





(6) Prediabetes


Status: Chronic





(7) Neurocognitive disorder


Status: Chronic








- Assessment and Plan (Free Text)


Assessment: 


cont meds, safety, sw, interventions, plan

## 2017-04-07 RX ADMIN — DIVALPROEX SODIUM SCH MG: 500 TABLET, DELAYED RELEASE ORAL at 09:27

## 2017-04-07 RX ADMIN — DIVALPROEX SODIUM SCH MG: 500 TABLET, DELAYED RELEASE ORAL at 17:32

## 2017-04-07 NOTE — CP.PCM.PN
Subjective





- Date & Time of Evaluation


Date of Evaluation: 04/07/17


Time of Evaluation: 07:34





- Subjective


Subjective: 


no complaints/distress


no f/c, n/v/d


pending placement





Objective





- Vital Signs/Intake and Output


Vital Signs (last 24 hours): 


 











Temp Pulse Resp BP Pulse Ox


 


 98.7 F   66   20   108/69   98 


 


 04/06/17 19:55  04/06/17 21:11  04/06/17 19:55  04/06/17 21:11  04/06/17 19:55











- Medications


Medications: 


 Current Medications





Aspirin (Ecotrin)  81 mg PO DAILY [facility]


   Last Admin: 04/06/17 09:48 Dose:  81 mg


Atorvastatin Calcium (Lipitor)  20 mg PO HS [facility]


   Last Admin: 04/06/17 21:13 Dose:  20 mg


Divalproex Sodium (Depakote Dr(*Bid*))  500 mg PO BID [facility]


   Last Admin: 04/06/17 18:07 Dose:  500 mg


Enalapril Maleate (Vasotec)  10 mg PO DAILY [facility]


   Last Admin: 04/06/17 09:47 Dose:  10 mg


Famotidine (Pepcid)  20 mg PO BID [facility]


   Last Admin: 04/06/17 18:07 Dose:  20 mg


Fenofibrate (Tricor)  145 mg PO DAILY [facility]


   Last Admin: 04/06/17 09:48 Dose:  145 mg


Metoprolol Tartrate (Lopressor)  50 mg PO Q12 [facility]


   Last Admin: 04/06/17 21:11 Dose:  50 mg


Quetiapine Fumarate (Seroquel)  25 mg PO HS [facility]


   Last Admin: 04/06/17 21:14 Dose:  25 mg











- Labs


Labs: 


 





 03/23/17 05:45 





 03/23/17 05:45 





 











PT  11.2 SECONDS (9.4-12.0)   12/14/15  05:20    


 


INR  1.05  (0.89-1.20)   12/14/15  05:20    


 


APTT  36.2 SECONDS (23.1-32.0)  H  12/14/15  05:20    














- Constitutional


Appears: Well, Non-toxic, No Acute Distress





- Head Exam


Head Exam: ATRAUMATIC, NORMAL INSPECTION, NORMOCEPHALIC





- Eye Exam


Eye Exam: EOMI, Normal appearance, PERRL


Pupil Exam: NORMAL ACCOMODATION, PERRL





- ENT Exam


ENT Exam: Mucous Membranes Moist, Normal Exam





- Neck Exam


Neck Exam: Full ROM, Normal Inspection.  absent: Lymphadenopathy





- Respiratory Exam


Respiratory Exam: Clear to Ausculation Bilateral, NORMAL BREATHING PATTERN





- Cardiovascular Exam


Cardiovascular Exam: REGULAR RHYTHM, +S1, +S2.  absent: Murmur





- GI/Abdominal Exam


GI & Abdominal Exam: Soft, Normal Bowel Sounds.  absent: Tenderness





- Extremities Exam


Extremities Exam: Full ROM, Normal Capillary Refill, Normal Inspection.  absent

: Joint Swelling, Pedal Edema





- Back Exam


Back Exam: NORMAL INSPECTION





- Neurological Exam


Neurological Exam: Alert, Awake, CN II-XII Intact, Normal Gait, Oriented x3





- Psychiatric Exam


Psychiatric exam: Normal Affect, Normal Mood





- Skin


Skin Exam: Dry, Intact, Normal Color, Warm





Assessment and Plan


(1) DVT prophylaxis


Status: Chronic





(2) CAD (coronary artery disease)


Status: Chronic





(3) Smoking


Status: Chronic





(4) Low back pain


Status: Chronic





(5) Scrotal edema


Status: Chronic





(6) Prediabetes


Status: Chronic





(7) Neurocognitive disorder


Status: Chronic








- Assessment and Plan (Free Text)


Assessment: 


cont meds, plan, safety, interventions, sw, placement

## 2017-04-08 RX ADMIN — DIVALPROEX SODIUM SCH MG: 500 TABLET, DELAYED RELEASE ORAL at 08:38

## 2017-04-08 RX ADMIN — DIVALPROEX SODIUM SCH MG: 500 TABLET, DELAYED RELEASE ORAL at 16:31

## 2017-04-08 NOTE — CP.PCM.PN
Subjective





- Date & Time of Evaluation


Date of Evaluation: 04/08/17


Time of Evaluation: 09:59





- Subjective


Subjective: 


no compalints/distrress


no f/c, n/v/d


pending placement





Objective





- Vital Signs/Intake and Output


Vital Signs (last 24 hours): 


 











Temp Pulse Resp BP Pulse Ox


 


 97.6 F   62   18   107/67   98 


 


 04/08/17 08:35  04/08/17 08:35  04/08/17 08:35  04/08/17 08:35  04/08/17 08:35











- Medications


Medications: 


 Current Medications





Aspirin (Ecotrin)  81 mg PO DAILY [facility]


   Last Admin: 04/08/17 08:38 Dose:  81 mg


Atorvastatin Calcium (Lipitor)  20 mg PO HS [facility]


   Last Admin: 04/07/17 21:53 Dose:  20 mg


Divalproex Sodium (Depakote Dr(*Bid*))  500 mg PO BID [facility]


   Last Admin: 04/08/17 08:38 Dose:  500 mg


Enalapril Maleate (Vasotec)  10 mg PO DAILY [facility]


   Last Admin: 04/08/17 08:38 Dose:  10 mg


Famotidine (Pepcid)  20 mg PO BID [facility]


   Last Admin: 04/07/17 17:32 Dose:  20 mg


Fenofibrate (Tricor)  145 mg PO DAILY [facility]


   Last Admin: 04/08/17 08:39 Dose:  145 mg


Metoprolol Tartrate (Lopressor)  50 mg PO Q12 [facility]


   Last Admin: 04/07/17 21:53 Dose:  50 mg


Quetiapine Fumarate (Seroquel)  25 mg PO HS [facility]


   Last Admin: 04/07/17 21:53 Dose:  25 mg











- Labs


Labs: 


 





 03/23/17 05:45 





 03/23/17 05:45 





 











PT  11.2 SECONDS (9.4-12.0)   12/14/15  05:20    


 


INR  1.05  (0.89-1.20)   12/14/15  05:20    


 


APTT  36.2 SECONDS (23.1-32.0)  H  12/14/15  05:20    














- Constitutional


Appears: Well, Non-toxic, No Acute Distress





- Head Exam


Head Exam: ATRAUMATIC, NORMAL INSPECTION, NORMOCEPHALIC





- Eye Exam


Eye Exam: EOMI, Normal appearance, PERRL


Pupil Exam: NORMAL ACCOMODATION, PERRL





- ENT Exam


ENT Exam: Mucous Membranes Moist, Normal Exam





- Neck Exam


Neck Exam: Full ROM, Normal Inspection.  absent: Lymphadenopathy





- Respiratory Exam


Respiratory Exam: Clear to Ausculation Bilateral, NORMAL BREATHING PATTERN





- Cardiovascular Exam


Cardiovascular Exam: REGULAR RHYTHM, RRR, +S1, +S2.  absent: Murmur





- GI/Abdominal Exam


GI & Abdominal Exam: Soft, Normal Bowel Sounds.  absent: Tenderness





- Extremities Exam


Extremities Exam: Full ROM, Normal Capillary Refill, Normal Inspection.  absent

: Joint Swelling, Pedal Edema





- Back Exam


Back Exam: NORMAL INSPECTION





- Neurological Exam


Neurological Exam: Alert, Awake, CN II-XII Intact, Normal Gait, Oriented x3





- Psychiatric Exam


Psychiatric exam: Normal Affect, Normal Mood





- Skin


Skin Exam: Dry, Intact, Normal Color, Warm





Assessment and Plan


(1) DVT prophylaxis


Status: Chronic





(2) CAD (coronary artery disease)


Status: Chronic





(3) Smoking


Status: Chronic





(4) Low back pain


Status: Chronic





(5) Scrotal edema


Status: Chronic





(6) Prediabetes


Status: Chronic





(7) Neurocognitive disorder


Status: Chronic








- Assessment and Plan (Free Text)


Assessment: 


cont meds, plan, safety, interventions, sw, placement

## 2017-04-09 RX ADMIN — DIVALPROEX SODIUM SCH MG: 500 TABLET, DELAYED RELEASE ORAL at 08:33

## 2017-04-09 RX ADMIN — DIVALPROEX SODIUM SCH MG: 500 TABLET, DELAYED RELEASE ORAL at 16:52

## 2017-04-09 NOTE — CP.PCM.PN
Subjective





- Date & Time of Evaluation


Date of Evaluation: 04/09/17


Time of Evaluation: 13:59





- Subjective


Subjective: 


no complaints/distress


no f/c, n/v/d


pendning placement





Objective





- Vital Signs/Intake and Output


Vital Signs (last 24 hours): 


 











Temp Pulse Resp BP Pulse Ox


 


 98.3 F   60   18   101/68   99 


 


 04/09/17 08:03  04/09/17 08:34  04/09/17 08:03  04/09/17 08:34  04/09/17 08:03











- Medications


Medications: 


 Current Medications





Aspirin (Ecotrin)  81 mg PO DAILY [facility]


   Last Admin: 04/09/17 08:33 Dose:  81 mg


Atorvastatin Calcium (Lipitor)  20 mg PO HS [facility]


   Last Admin: 04/08/17 21:15 Dose:  20 mg


Divalproex Sodium (Depakote Dr(*Bid*))  500 mg PO BID [facility]


   Last Admin: 04/09/17 08:33 Dose:  500 mg


Enalapril Maleate (Vasotec)  10 mg PO DAILY [facility]


   Last Admin: 04/09/17 08:33 Dose:  10 mg


Famotidine (Pepcid)  20 mg PO BID [facility]


   Last Admin: 04/09/17 08:33 Dose:  20 mg


Fenofibrate (Tricor)  145 mg PO DAILY [facility]


   Last Admin: 04/09/17 08:33 Dose:  145 mg


Metoprolol Tartrate (Lopressor)  50 mg PO Q12 [facility]


   Last Admin: 04/09/17 08:34 Dose:  50 mg


Quetiapine Fumarate (Seroquel)  25 mg PO HS [facility]


   Last Admin: 04/08/17 21:15 Dose:  25 mg











- Labs


Labs: 


 





 03/23/17 05:45 





 03/23/17 05:45 





 











PT  11.2 SECONDS (9.4-12.0)   12/14/15  05:20    


 


INR  1.05  (0.89-1.20)   12/14/15  05:20    


 


APTT  36.2 SECONDS (23.1-32.0)  H  12/14/15  05:20    














- Constitutional


Appears: Well, Non-toxic, No Acute Distress





- Head Exam


Head Exam: ATRAUMATIC, NORMAL INSPECTION, NORMOCEPHALIC





- Eye Exam


Eye Exam: EOMI, Normal appearance, PERRL


Pupil Exam: NORMAL ACCOMODATION, PERRL





- ENT Exam


ENT Exam: Mucous Membranes Moist, Normal Exam





- Neck Exam


Neck Exam: Full ROM, Normal Inspection.  absent: Lymphadenopathy





- Respiratory Exam


Respiratory Exam: Clear to Ausculation Bilateral, NORMAL BREATHING PATTERN





- Cardiovascular Exam


Cardiovascular Exam: REGULAR RHYTHM, RRR, +S1, +S2.  absent: Murmur





- GI/Abdominal Exam


GI & Abdominal Exam: Soft, Normal Bowel Sounds.  absent: Tenderness





- Extremities Exam


Extremities Exam: Full ROM, Normal Capillary Refill, Normal Inspection.  absent

: Joint Swelling, Pedal Edema





- Back Exam


Back Exam: NORMAL INSPECTION





- Neurological Exam


Neurological Exam: Alert, Awake, CN II-XII Intact, Normal Gait, Oriented x3





- Psychiatric Exam


Psychiatric exam: Normal Affect, Normal Mood





- Skin


Skin Exam: Dry, Intact, Normal Color, Warm





Assessment and Plan


(1) DVT prophylaxis


Status: Chronic





(2) CAD (coronary artery disease)


Status: Chronic





(3) Smoking


Status: Chronic





(4) Low back pain


Status: Chronic





(5) Scrotal edema


Status: Chronic





(6) Prediabetes


Status: Chronic





(7) Neurocognitive disorder


Status: Chronic








- Assessment and Plan (Free Text)


Assessment: 


cont plan, placement, safety, sw

## 2017-04-10 RX ADMIN — DIVALPROEX SODIUM SCH MG: 500 TABLET, DELAYED RELEASE ORAL at 08:17

## 2017-04-10 RX ADMIN — DIVALPROEX SODIUM SCH MG: 500 TABLET, DELAYED RELEASE ORAL at 16:10

## 2017-04-10 NOTE — CP.PCM.PN
Subjective





- Date & Time of Evaluation


Date of Evaluation: 04/10/17


Time of Evaluation: 07:30





- Subjective


Subjective: 


no complaints/distress


no f/c, n/v/d


pending placment





Objective





- Vital Signs/Intake and Output


Vital Signs (last 24 hours): 


 











Temp Pulse Resp BP Pulse Ox


 


 98.1 F   72   20   108/73   97 


 


 04/09/17 22:00  04/09/17 22:00  04/09/17 22:00  04/09/17 22:00  04/09/17 22:00











- Medications


Medications: 


 Current Medications





Aspirin (Ecotrin)  81 mg PO DAILY [facility]


   Last Admin: 04/09/17 08:33 Dose:  81 mg


Atorvastatin Calcium (Lipitor)  20 mg PO HS [facility]


   Last Admin: 04/09/17 21:36 Dose:  20 mg


Divalproex Sodium (Depakote Dr(*Bid*))  500 mg PO BID [facility]


   Last Admin: 04/09/17 16:52 Dose:  500 mg


Enalapril Maleate (Vasotec)  10 mg PO DAILY [facility]


   Last Admin: 04/09/17 08:33 Dose:  10 mg


Famotidine (Pepcid)  20 mg PO BID [facility]


   Last Admin: 04/09/17 16:52 Dose:  20 mg


Fenofibrate (Tricor)  145 mg PO DAILY [facility]


   Last Admin: 04/09/17 08:33 Dose:  145 mg


Metoprolol Tartrate (Lopressor)  50 mg PO Q12 [facility]


   Last Admin: 04/09/17 21:36 Dose:  50 mg


Quetiapine Fumarate (Seroquel)  25 mg PO HS [facility]


   Last Admin: 04/09/17 21:36 Dose:  25 mg











- Labs


Labs: 


 





 03/23/17 05:45 





 03/23/17 05:45 





 











PT  11.2 SECONDS (9.4-12.0)   12/14/15  05:20    


 


INR  1.05  (0.89-1.20)   12/14/15  05:20    


 


APTT  36.2 SECONDS (23.1-32.0)  H  12/14/15  05:20    














- Constitutional


Appears: Well, Non-toxic, No Acute Distress





- Head Exam


Head Exam: ATRAUMATIC, NORMAL INSPECTION, NORMOCEPHALIC





- Eye Exam


Eye Exam: EOMI, Normal appearance, PERRL


Pupil Exam: NORMAL ACCOMODATION, PERRL





- ENT Exam


ENT Exam: Mucous Membranes Moist, Normal Exam





- Neck Exam


Neck Exam: Full ROM, Normal Inspection.  absent: Lymphadenopathy





- Respiratory Exam


Respiratory Exam: Clear to Ausculation Bilateral, NORMAL BREATHING PATTERN





- Cardiovascular Exam


Cardiovascular Exam: REGULAR RHYTHM, RRR, +S1, +S2.  absent: Murmur





- GI/Abdominal Exam


GI & Abdominal Exam: Soft, Normal Bowel Sounds.  absent: Tenderness





- Extremities Exam


Extremities Exam: Full ROM, Normal Capillary Refill, Normal Inspection.  absent

: Joint Swelling, Pedal Edema





- Back Exam


Back Exam: NORMAL INSPECTION





- Neurological Exam


Neurological Exam: Alert, Awake, CN II-XII Intact, Normal Gait, Oriented x3





- Psychiatric Exam


Psychiatric exam: Normal Affect, Normal Mood





- Skin


Skin Exam: Dry, Intact, Normal Color, Warm





Assessment and Plan


(1) DVT prophylaxis


Status: Chronic





(2) CAD (coronary artery disease)


Status: Chronic





(3) Smoking


Status: Chronic





(4) Low back pain


Status: Chronic





(5) Scrotal edema


Status: Chronic





(6) Prediabetes


Status: Chronic





(7) Neurocognitive disorder


Status: Chronic








- Assessment and Plan (Free Text)


Assessment: 


cont meds, intervention safety, sw, placement

## 2017-04-11 RX ADMIN — DIVALPROEX SODIUM SCH MG: 500 TABLET, DELAYED RELEASE ORAL at 17:44

## 2017-04-11 RX ADMIN — DIVALPROEX SODIUM SCH MG: 500 TABLET, DELAYED RELEASE ORAL at 10:21

## 2017-04-11 NOTE — CP.PCM.PN
Subjective





- Date & Time of Evaluation


Date of Evaluation: 04/11/17


Time of Evaluation: 07:09





- Subjective


Subjective: 


no complaints/distress


no f/cm n/v/d


pendin gplacement





Objective





- Vital Signs/Intake and Output


Vital Signs (last 24 hours): 


 











Temp Pulse Resp BP Pulse Ox


 


 98.6 F   74   20   107/69   99 


 


 04/10/17 20:39  04/10/17 21:28  04/10/17 20:39  04/10/17 21:28  04/10/17 20:39











- Medications


Medications: 


 Current Medications





Aspirin (Ecotrin)  81 mg PO DAILY [facility]


   Last Admin: 04/10/17 08:17 Dose:  81 mg


Atorvastatin Calcium (Lipitor)  20 mg PO HS [facility]


   Last Admin: 04/10/17 21:28 Dose:  20 mg


Divalproex Sodium (Depakote Dr(*Bid*))  500 mg PO BID [facility]


   Last Admin: 04/10/17 16:10 Dose:  500 mg


Enalapril Maleate (Vasotec)  10 mg PO DAILY [facility]


   Last Admin: 04/10/17 08:17 Dose:  10 mg


Famotidine (Pepcid)  20 mg PO BID [facility]


   Last Admin: 04/10/17 16:10 Dose:  20 mg


Fenofibrate (Tricor)  145 mg PO DAILY [facility]


   Last Admin: 04/10/17 08:17 Dose:  145 mg


Metoprolol Tartrate (Lopressor)  50 mg PO Q12 [facility]


   Last Admin: 04/10/17 21:28 Dose:  Not Given


Quetiapine Fumarate (Seroquel)  25 mg PO HS [facility]


   Last Admin: 04/10/17 21:28 Dose:  25 mg











- Labs


Labs: 


 





 03/23/17 05:45 





 03/23/17 05:45 





 











PT  11.2 SECONDS (9.4-12.0)   12/14/15  05:20    


 


INR  1.05  (0.89-1.20)   12/14/15  05:20    


 


APTT  36.2 SECONDS (23.1-32.0)  H  12/14/15  05:20    














- Constitutional


Appears: Well, Non-toxic, No Acute Distress





- Head Exam


Head Exam: ATRAUMATIC, NORMAL INSPECTION, NORMOCEPHALIC





- Eye Exam


Eye Exam: EOMI, Normal appearance, PERRL


Pupil Exam: NORMAL ACCOMODATION, PERRL





- ENT Exam


ENT Exam: Mucous Membranes Moist, Normal Exam





- Neck Exam


Neck Exam: Full ROM, Normal Inspection.  absent: Lymphadenopathy





- Respiratory Exam


Respiratory Exam: Clear to Ausculation Bilateral, NORMAL BREATHING PATTERN





- Cardiovascular Exam


Cardiovascular Exam: REGULAR RHYTHM, +S1, +S2.  absent: Murmur





- GI/Abdominal Exam


GI & Abdominal Exam: Soft, Normal Bowel Sounds.  absent: Tenderness





- Extremities Exam


Extremities Exam: Full ROM, Normal Capillary Refill, Normal Inspection.  absent

: Joint Swelling, Pedal Edema





- Back Exam


Back Exam: NORMAL INSPECTION





- Neurological Exam


Neurological Exam: Alert, Awake, CN II-XII Intact, Normal Gait, Oriented x3





- Psychiatric Exam


Psychiatric exam: Normal Affect, Normal Mood





- Skin


Skin Exam: Dry, Intact, Normal Color, Warm





Assessment and Plan


(1) DVT prophylaxis


Status: Chronic





(2) CAD (coronary artery disease)


Status: Chronic





(3) Smoking


Status: Chronic





(4) Low back pain


Status: Chronic





(5) Scrotal edema


Status: Chronic





(6) Prediabetes


Status: Chronic





(7) Neurocognitive disorder


Status: Chronic








- Assessment and Plan (Free Text)


Assessment: 


cont med, plan, intervention, sw

## 2017-04-12 RX ADMIN — DIVALPROEX SODIUM SCH MG: 500 TABLET, DELAYED RELEASE ORAL at 09:37

## 2017-04-12 RX ADMIN — DIVALPROEX SODIUM SCH MG: 500 TABLET, DELAYED RELEASE ORAL at 18:32

## 2017-04-12 NOTE — CP.PCM.PN
Subjective





- Date & Time of Evaluation


Date of Evaluation: 04/12/17


Time of Evaluation: 08:10





- Subjective


Subjective: 


no f/c, n/v/d


pending placement


no complaints/distress





Objective





- Vital Signs/Intake and Output


Vital Signs (last 24 hours): 


 











Temp Pulse Resp BP Pulse Ox


 


 97.3 F L  63   20   106/65   98 


 


 04/12/17 07:58  04/12/17 07:58  04/12/17 07:58  04/12/17 07:58  04/12/17 07:58











- Medications


Medications: 


 Current Medications





Aspirin (Ecotrin)  81 mg PO DAILY [facility]


   Last Admin: 04/11/17 10:21 Dose:  81 mg


Atorvastatin Calcium (Lipitor)  20 mg PO HS [facility]


   Last Admin: 04/11/17 21:00 Dose:  20 mg


Divalproex Sodium (Depakote Dr(*Bid*))  500 mg PO BID [facility]


   Last Admin: 04/11/17 17:44 Dose:  500 mg


Enalapril Maleate (Vasotec)  10 mg PO DAILY [facility]


   Last Admin: 04/11/17 10:23 Dose:  10 mg


Famotidine (Pepcid)  20 mg PO BID [facility]


   Last Admin: 04/11/17 17:44 Dose:  20 mg


Fenofibrate (Tricor)  145 mg PO DAILY [facility]


   Last Admin: 04/11/17 10:22 Dose:  145 mg


Metoprolol Tartrate (Lopressor)  50 mg PO Q12 [facility]


   Last Admin: 04/11/17 21:00 Dose:  50 mg


Quetiapine Fumarate (Seroquel)  25 mg PO HS [facility]


   Last Admin: 04/11/17 21:00 Dose:  25 mg











- Labs


Labs: 


 





 03/23/17 05:45 





 03/23/17 05:45 





 











PT  11.2 SECONDS (9.4-12.0)   12/14/15  05:20    


 


INR  1.05  (0.89-1.20)   12/14/15  05:20    


 


APTT  36.2 SECONDS (23.1-32.0)  H  12/14/15  05:20    














- Constitutional


Appears: Well, Non-toxic, No Acute Distress





- Head Exam


Head Exam: ATRAUMATIC, NORMAL INSPECTION, NORMOCEPHALIC





- Eye Exam


Eye Exam: EOMI, Normal appearance, PERRL


Pupil Exam: NORMAL ACCOMODATION, PERRL





- ENT Exam


ENT Exam: Mucous Membranes Moist, Normal Exam





- Neck Exam


Neck Exam: Full ROM, Normal Inspection.  absent: Lymphadenopathy





- Respiratory Exam


Respiratory Exam: Clear to Ausculation Bilateral, NORMAL BREATHING PATTERN





- Cardiovascular Exam


Cardiovascular Exam: REGULAR RHYTHM, RRR, +S1, +S2.  absent: Murmur





- GI/Abdominal Exam


GI & Abdominal Exam: Soft, Normal Bowel Sounds.  absent: Tenderness





- Extremities Exam


Extremities Exam: Full ROM, Normal Capillary Refill, Normal Inspection.  absent

: Joint Swelling, Pedal Edema





- Back Exam


Back Exam: NORMAL INSPECTION





- Neurological Exam


Neurological Exam: Alert, Awake, CN II-XII Intact, Normal Gait, Oriented x3





- Psychiatric Exam


Psychiatric exam: Normal Affect, Normal Mood





- Skin


Skin Exam: Dry, Intact, Normal Color, Warm





Assessment and Plan


(1) DVT prophylaxis


Status: Chronic





(2) CAD (coronary artery disease)


Status: Chronic





(3) Smoking


Status: Chronic





(4) Low back pain


Status: Chronic





(5) Scrotal edema


Status: Chronic





(6) Prediabetes


Status: Chronic





(7) Neurocognitive disorder


Status: Chronic








- Assessment and Plan (Free Text)


Assessment: 


cont sw, meds, interventions, placement

## 2017-04-13 RX ADMIN — DIVALPROEX SODIUM SCH MG: 500 TABLET, DELAYED RELEASE ORAL at 09:18

## 2017-04-13 RX ADMIN — DIVALPROEX SODIUM SCH MG: 500 TABLET, DELAYED RELEASE ORAL at 17:28

## 2017-04-13 NOTE — CP.PCM.PN
Subjective





- Date & Time of Evaluation


Date of Evaluation: 04/13/17


Time of Evaluation: 07:27





- Subjective


Subjective: 


pending placement, no f/c, n/v/d


no complaints/distress





Objective





- Vital Signs/Intake and Output


Vital Signs (last 24 hours): 


 











Temp Pulse Resp BP Pulse Ox


 


 98.3 F   85   20   139/88   98 


 


 04/12/17 21:29  04/12/17 21:45  04/12/17 21:29  04/12/17 21:45  04/12/17 21:29











- Medications


Medications: 


 Current Medications





Aspirin (Ecotrin)  81 mg PO DAILY [facility]


   Last Admin: 04/12/17 09:37 Dose:  81 mg


Atorvastatin Calcium (Lipitor)  20 mg PO HS [facility]


   Last Admin: 04/12/17 21:45 Dose:  20 mg


Divalproex Sodium (Depakote Dr(*Bid*))  500 mg PO BID [facility]


   Last Admin: 04/12/17 18:32 Dose:  500 mg


Enalapril Maleate (Vasotec)  10 mg PO DAILY [facility]


   Last Admin: 04/12/17 09:38 Dose:  10 mg


Famotidine (Pepcid)  20 mg PO BID [facility]


   Last Admin: 04/12/17 18:32 Dose:  20 mg


Fenofibrate (Tricor)  145 mg PO DAILY [facility]


   Last Admin: 04/12/17 09:38 Dose:  145 mg


Metoprolol Tartrate (Lopressor)  50 mg PO Q12 [facility]


   Last Admin: 04/12/17 21:45 Dose:  50 mg


Quetiapine Fumarate (Seroquel)  25 mg PO HS [facility]


   Last Admin: 04/12/17 21:45 Dose:  25 mg











- Labs


Labs: 


 





 03/23/17 05:45 





 03/23/17 05:45 





 











PT  11.2 SECONDS (9.4-12.0)   12/14/15  05:20    


 


INR  1.05  (0.89-1.20)   12/14/15  05:20    


 


APTT  36.2 SECONDS (23.1-32.0)  H  12/14/15  05:20    














- Constitutional


Appears: Well, Non-toxic, No Acute Distress





- Head Exam


Head Exam: ATRAUMATIC, NORMAL INSPECTION, NORMOCEPHALIC





- Eye Exam


Eye Exam: EOMI, Normal appearance, PERRL


Pupil Exam: NORMAL ACCOMODATION, PERRL





- ENT Exam


ENT Exam: Mucous Membranes Moist, Normal Exam





- Neck Exam


Neck Exam: Full ROM, Normal Inspection.  absent: Lymphadenopathy





- Respiratory Exam


Respiratory Exam: Clear to Ausculation Bilateral, NORMAL BREATHING PATTERN





- Cardiovascular Exam


Cardiovascular Exam: REGULAR RHYTHM, RRR, +S1, +S2.  absent: Murmur





- GI/Abdominal Exam


GI & Abdominal Exam: Soft, Normal Bowel Sounds.  absent: Tenderness





- Extremities Exam


Extremities Exam: Full ROM, Normal Capillary Refill, Normal Inspection.  absent

: Joint Swelling, Pedal Edema





- Back Exam


Back Exam: NORMAL INSPECTION





- Neurological Exam


Neurological Exam: Alert, Awake, CN II-XII Intact, Normal Gait, Oriented x3





- Psychiatric Exam


Psychiatric exam: Normal Affect, Normal Mood





- Skin


Skin Exam: Dry, Intact, Normal Color, Warm





Assessment and Plan


(1) DVT prophylaxis


Status: Chronic





(2) CAD (coronary artery disease)


Status: Chronic





(3) Smoking


Status: Chronic





(4) Low back pain


Status: Chronic





(5) Scrotal edema


Status: Chronic





(6) Prediabetes


Status: Chronic





(7) Neurocognitive disorder


Status: Chronic








- Assessment and Plan (Free Text)


Assessment: 


cont meds, sw, interventions, placement,s afety

## 2017-04-14 RX ADMIN — DIVALPROEX SODIUM SCH MG: 500 TABLET, DELAYED RELEASE ORAL at 08:51

## 2017-04-14 RX ADMIN — DIVALPROEX SODIUM SCH MG: 500 TABLET, DELAYED RELEASE ORAL at 17:18

## 2017-04-14 NOTE — CP.PCM.CON
History of Present Illness





- History of Present Illness


History of Present Illness: 


Psychiatry consult called for evaluation of capacity





HPI: 57 year old homeless  male with a PMH cardiac arrest, CAD, HTN, 

Afib, h/o intubation, currently awaiting guardianship.  Patient has been 

hospitalized for over a year.  He is not able to give any coherent history as 

to why he is in the hospital, does not know where he is.  He was able to state 

the date by looking at the date written on the wall.  He does not understand 

any medical treatments he is receiving or why.  He denies depression/anxiety/

hallucinations/suicidal or homicidal ideation to writer.





Psychiatric History: Unknown, possible ETOH abuse





Substance Use History: Unknown





Family History: Unknown





Social History: From [place], remaining is unknown





Mental Status Exam: Alert to self and date (read it off the wall). Appears 

stated age, good eye contact, but disinterested in talking to the writer.  

Thought process- loose.  Thought content- states he wants to go home, unable to 

engage in a full conversation without losing interest, likely due to poor 

attention. Psychomotor normal.  Poor insight/judgment.  Mood "Okay".  Affect- 

calm, broad. Speech- normal rate/rhythm/volume.  





Impression: 57 year old homeless male with a PMH cardiac arrest, CAD, HTN, Afib

, h/o intubation, currently awaiting guardianship.  Patient has been 

hospitalized for over a year. Patient has neurocognitive impairment due to 

medical condition.





-Patient does not have any acute psychiatric issues and does not require 

inpatient psychiatric admission


-Pt does not have the capacity to make any medical decisions or capacity to 

decide to leave the hospital. In order to have capacity he needs to be able to 

communicate his choices, the risks and benefits. He also needs to make his 

decision in a logical manner. He is unable to fulfill any of these 

requirements. 


-Contact with further questions, Dr. Christie [phone] 





Review of Systems





- Review of Systems


All systems: reviewed and no additional remarkable complaints except





- Psychiatric


Psychiatric: Memory Loss





Past Patient History





- Past Medical History & Family History


Past Medical History?: Yes





- Past Social History


Smoking Status: Heavy Smoker > 10 Cigarettes Daily





- CARDIAC


Hx Cardiac Disorders: Yes





- NEUROLOGICAL


Hx Neurological Disorder: Yes (seizure)


Hx Seizures: Yes





- HEENT


Hx HEENT Problems: No





- RENAL


Hx Chronic Kidney Disease: No





- ENDOCRINE/METABOLIC


Hx Endocrine Disorders: No





- HEMATOLOGICAL/ONCOLOGICAL


Hx Blood Disorders: No





- INTEGUMENTARY


Hx Dermatological Problems: No





- MUSCULOSKELETAL/RHEUMATOLOGICAL


Hx Falls: No





- GENITOURINARY/GYNECOLOGICAL


Hx Genitourinary Disorders: No





- PSYCHIATRIC


Hx Substance Use: No





Meds


Home Medications: 


 Home Medication List











 Medication  Instructions  Recorded  Confirmed  Type


 


Aspirin [Ecotrin] 81 mg PO DAILY #0 tabec 03/21/16  Rx


 


Divalproex [Depakote DR(*BID*)] 500 mg PO BID #0 tcp 03/21/16  Rx


 


Enalapril Maleate [Vasotec] 10 mg PO DAILY #0 tab 03/21/16  Rx


 


Famotidine [Pepcid] 20 mg PO BID #0 tab 03/21/16  Rx


 


Ibuprofen [Motrin Tab] 600 mg PO Q8 PRN #0 tab 03/21/16  Rx


 


Metoprolol Tartrate [Lopressor] 50 mg PO Q12 #0 tab 03/21/16  Rx


 


Nicotine 21 mg/24 hr [Nicoderm Cq] 1 patch TD DAILY #0 patch 03/21/16  Rx


 


QUEtiapine [Seroquel] 25 mg PO DAILY@1700 #0 tab 03/21/16  Rx


 


levETIRAcetam [Keppra] 500 mg PO BID #0 tab 03/21/16  Rx











Allergies/Adverse Reactions: 


 Allergies











Allergy/AdvReac Type Severity Reaction Status Date / Time


 


No Known Allergies Allergy   Verified 06/02/16 00:33














- Medications


Medications: 


 Current Medications





Aspirin (Ecotrin)  81 mg PO DAILY [facility]


   Last Admin: 04/14/17 08:52 Dose:  81 mg


Atorvastatin Calcium (Lipitor)  20 mg PO HS [facility]


   Last Admin: 04/13/17 22:56 Dose:  20 mg


Divalproex Sodium (Depakote Dr(*Bid*))  500 mg PO BID [facility]


   Last Admin: 04/14/17 08:51 Dose:  500 mg


Enalapril Maleate (Vasotec)  10 mg PO DAILY [facility]


   Last Admin: 04/14/17 08:53 Dose:  Not Given


Famotidine (Pepcid)  20 mg PO BID [facility]


   Last Admin: 04/14/17 08:51 Dose:  20 mg


Fenofibrate (Tricor)  145 mg PO DAILY [facility]


   Last Admin: 04/14/17 08:53 Dose:  145 mg


Metoprolol Tartrate (Lopressor)  50 mg PO Q12 [facility]


   Last Admin: 04/14/17 08:52 Dose:  Not Given


Quetiapine Fumarate (Seroquel)  25 mg PO HS [facility]


   Last Admin: 04/13/17 22:56 Dose:  25 mg











Results





- Vital Signs


Recent Vital Signs: 


 Last Vital Signs











Temp  97.6 F   04/14/17 07:33


 


Pulse  54 L  04/14/17 08:52


 


Resp  20   04/14/17 07:33


 


BP  106/74   04/14/17 08:52


 


Pulse Ox  97   04/14/17 07:33














- Labs


Result Diagrams: 


 03/23/17 05:45





 03/23/17 05:45

## 2017-04-14 NOTE — CP.PCM.PN
Subjective





- Date & Time of Evaluation


Date of Evaluation: 04/14/17


Time of Evaluation: 14:12





- Subjective


Subjective: 


no complaints/distress


no f/c, n/v/d


pending palcement


sych and neuro notes appriciated





Objective





- Vital Signs/Intake and Output


Vital Signs (last 24 hours): 


 











Temp Pulse Resp BP Pulse Ox


 


 97.6 F   54 L  20   106/74   97 


 


 04/14/17 07:33  04/14/17 08:52  04/14/17 07:33  04/14/17 08:52  04/14/17 07:33











- Medications


Medications: 


 Current Medications





Aspirin (Ecotrin)  81 mg PO DAILY [facility]


   Last Admin: 04/14/17 08:52 Dose:  81 mg


Atorvastatin Calcium (Lipitor)  20 mg PO [facility]


   Last Admin: 04/13/17 22:56 Dose:  20 mg


Divalproex Sodium (Depakote Dr(*Bid*))  500 mg PO BID [facility]


   Last Admin: 04/14/17 08:51 Dose:  500 mg


Enalapril Maleate (Vasotec)  10 mg PO DAILY [facility]


   Last Admin: 04/14/17 08:53 Dose:  Not Given


Famotidine (Pepcid)  20 mg PO BID [facility]


   Last Admin: 04/14/17 08:51 Dose:  20 mg


Fenofibrate (Tricor)  145 mg PO DAILY [facility]


   Last Admin: 04/14/17 08:53 Dose:  145 mg


Metoprolol Tartrate (Lopressor)  50 mg PO Q12 [facility]


   Last Admin: 04/14/17 08:52 Dose:  Not Given


Quetiapine Fumarate (Seroquel)  25 mg PO HS [facility]


   Last Admin: 04/13/17 22:56 Dose:  25 mg











- Labs


Labs: 


 





 03/23/17 05:45 





 03/23/17 05:45 





 











PT  11.2 SECONDS (9.4-12.0)   12/14/15  05:20    


 


INR  1.05  (0.89-1.20)   12/14/15  05:20    


 


APTT  36.2 SECONDS (23.1-32.0)  H  12/14/15  05:20    














- Constitutional


Appears: Well, Non-toxic, No Acute Distress





- Head Exam


Head Exam: ATRAUMATIC, NORMAL INSPECTION, NORMOCEPHALIC





- Eye Exam


Eye Exam: EOMI, Normal appearance, PERRL


Pupil Exam: NORMAL ACCOMODATION, PERRL





- ENT Exam


ENT Exam: Mucous Membranes Moist, Normal Exam





- Neck Exam


Neck Exam: Full ROM, Normal Inspection.  absent: Lymphadenopathy





- Respiratory Exam


Respiratory Exam: Clear to Ausculation Bilateral, NORMAL BREATHING PATTERN





- Cardiovascular Exam


Cardiovascular Exam: REGULAR RHYTHM, RRR, +S1, +S2.  absent: Murmur





- GI/Abdominal Exam


GI & Abdominal Exam: Soft, Normal Bowel Sounds.  absent: Tenderness





- Extremities Exam


Extremities Exam: Full ROM, Normal Capillary Refill, Normal Inspection.  absent

: Joint Swelling, Pedal Edema





- Back Exam


Back Exam: NORMAL INSPECTION





- Neurological Exam


Neurological Exam: Alert, Awake, CN II-XII Intact, Normal Gait, Oriented x3





- Psychiatric Exam


Psychiatric exam: Normal Affect, Normal Mood





- Skin


Skin Exam: Dry, Intact, Normal Color, Warm





Assessment and Plan


(1) DVT prophylaxis


Status: Chronic





(2) CAD (coronary artery disease)


Status: Chronic





(3) Smoking


Status: Chronic





(4) Low back pain


Status: Chronic





(5) Scrotal edema


Status: Chronic





(6) Prediabetes


Status: Chronic





(7) Neurocognitive disorder


Status: Chronic








- Assessment and Plan (Free Text)


Assessment: 


cont meds sw,placement, safety

## 2017-04-15 RX ADMIN — DIVALPROEX SODIUM SCH MG: 500 TABLET, DELAYED RELEASE ORAL at 08:55

## 2017-04-15 RX ADMIN — DIVALPROEX SODIUM SCH MG: 500 TABLET, DELAYED RELEASE ORAL at 16:17

## 2017-04-15 NOTE — CP.PCM.PN
Subjective





- Date & Time of Evaluation


Date of Evaluation: 04/15/17


Time of Evaluation: 07:31





- Subjective


Subjective: 


no complaints/distredss


no f/c, nv//d


pending placement





Objective





- Vital Signs/Intake and Output


Vital Signs (last 24 hours): 


 











Temp Pulse Resp BP Pulse Ox


 


 98.6 F   69   18   124/76   96 


 


 04/14/17 21:00  04/14/17 21:11  04/14/17 21:00  04/14/17 21:11  04/14/17 21:00











- Medications


Medications: 


 Current Medications





Aspirin (Ecotrin)  81 mg PO DAILY [facility]


   Last Admin: 04/14/17 08:52 Dose:  81 mg


Atorvastatin Calcium (Lipitor)  20 mg PO HS [facility]


   Last Admin: 04/14/17 21:11 Dose:  20 mg


Divalproex Sodium (Depakote Dr(*Bid*))  500 mg PO BID [facility]


   Last Admin: 04/14/17 17:18 Dose:  500 mg


Enalapril Maleate (Vasotec)  10 mg PO DAILY [facility]


   Last Admin: 04/14/17 08:53 Dose:  Not Given


Famotidine (Pepcid)  20 mg PO BID [facility]


   Last Admin: 04/14/17 17:18 Dose:  20 mg


Fenofibrate (Tricor)  145 mg PO DAILY [facility]


   Last Admin: 04/14/17 08:53 Dose:  145 mg


Metoprolol Tartrate (Lopressor)  50 mg PO Q12 [facility]


   Last Admin: 04/14/17 21:11 Dose:  50 mg


Quetiapine Fumarate (Seroquel)  25 mg PO HS [facility]


   Last Admin: 04/14/17 21:11 Dose:  25 mg











- Labs


Labs: 


 





 03/23/17 05:45 





 03/23/17 05:45 





 











PT  11.2 SECONDS (9.4-12.0)   12/14/15  05:20    


 


INR  1.05  (0.89-1.20)   12/14/15  05:20    


 


APTT  36.2 SECONDS (23.1-32.0)  H  12/14/15  05:20    














- Constitutional


Appears: Well, Non-toxic, No Acute Distress





- Head Exam


Head Exam: ATRAUMATIC, NORMAL INSPECTION, NORMOCEPHALIC





- Eye Exam


Eye Exam: EOMI, Normal appearance, PERRL


Pupil Exam: NORMAL ACCOMODATION, PERRL





- ENT Exam


ENT Exam: Mucous Membranes Moist, Normal Exam





- Neck Exam


Neck Exam: Full ROM, Normal Inspection.  absent: Lymphadenopathy





- Respiratory Exam


Respiratory Exam: Clear to Ausculation Bilateral, NORMAL BREATHING PATTERN





- Cardiovascular Exam


Cardiovascular Exam: REGULAR RHYTHM, RRR, +S1, +S2.  absent: Murmur





- GI/Abdominal Exam


GI & Abdominal Exam: Soft, Normal Bowel Sounds.  absent: Tenderness





-  Exam


External exam: NORMAL EXTERNAL EXAM





- Extremities Exam


Extremities Exam: Full ROM, Normal Capillary Refill, Normal Inspection.  absent

: Joint Swelling, Pedal Edema





- Back Exam


Back Exam: Full ROM, NORMAL INSPECTION





- Neurological Exam


Neurological Exam: Alert, Awake, CN II-XII Intact, Normal Gait, Oriented x3





- Psychiatric Exam


Psychiatric exam: Normal Affect, Normal Mood





- Skin


Skin Exam: Dry, Intact, Normal Color, Warm





Assessment and Plan


(1) DVT prophylaxis


Status: Chronic





(2) CAD (coronary artery disease)


Status: Chronic





(3) Smoking


Status: Chronic





(4) Low back pain


Status: Chronic





(5) Scrotal edema


Status: Chronic





(6) Prediabetes


Status: Chronic





(7) Neurocognitive disorder


Status: Chronic








- Assessment and Plan (Free Text)


Assessment: 


cont meds, dsafety, intervention, placement

## 2017-04-16 RX ADMIN — DIVALPROEX SODIUM SCH MG: 500 TABLET, DELAYED RELEASE ORAL at 16:58

## 2017-04-16 RX ADMIN — DIVALPROEX SODIUM SCH MG: 500 TABLET, DELAYED RELEASE ORAL at 08:46

## 2017-04-16 NOTE — CP.PCM.PN
Subjective





- Date & Time of Evaluation


Date of Evaluation: 04/16/17


Time of Evaluation: 08:19





- Subjective


Subjective: 


no f/c, n/v/d


pending palcement, no complaints/distress





Objective





- Vital Signs/Intake and Output


Vital Signs (last 24 hours): 


 











Temp Pulse Resp BP Pulse Ox


 


 97.3 F L  70   20   97/63 L  97 


 


 04/16/17 07:34  04/16/17 07:34  04/16/17 07:34  04/16/17 07:34  04/16/17 07:34











- Medications


Medications: 


 Current Medications





Aspirin (Ecotrin)  81 mg PO DAILY [facility]


   Last Admin: 04/15/17 08:55 Dose:  81 mg


Atorvastatin Calcium (Lipitor)  20 mg PO HS [facility]


   Last Admin: 04/15/17 22:48 Dose:  20 mg


Divalproex Sodium (Depakote Dr(*Bid*))  500 mg PO BID [facility]


   Last Admin: 04/15/17 16:17 Dose:  500 mg


Enalapril Maleate (Vasotec)  10 mg PO DAILY [facility]


   Last Admin: 04/15/17 08:56 Dose:  10 mg


Famotidine (Pepcid)  20 mg PO BID [facility]


   Last Admin: 04/15/17 16:17 Dose:  20 mg


Fenofibrate (Tricor)  145 mg PO DAILY [facility]


   Last Admin: 04/15/17 08:56 Dose:  145 mg


Metoprolol Tartrate (Lopressor)  50 mg PO Q12 [facility]


   Last Admin: 04/15/17 22:48 Dose:  50 mg


Quetiapine Fumarate (Seroquel)  25 mg PO HS [facility]


   Last Admin: 04/15/17 22:48 Dose:  25 mg











- Labs


Labs: 


 





 03/23/17 05:45 





 03/23/17 05:45 





 











PT  11.2 SECONDS (9.4-12.0)   12/14/15  05:20    


 


INR  1.05  (0.89-1.20)   12/14/15  05:20    


 


APTT  36.2 SECONDS (23.1-32.0)  H  12/14/15  05:20    














- Constitutional


Appears: Well, Non-toxic, No Acute Distress





- Head Exam


Head Exam: ATRAUMATIC, NORMAL INSPECTION, NORMOCEPHALIC





- Eye Exam


Eye Exam: EOMI, Normal appearance, PERRL


Pupil Exam: NORMAL ACCOMODATION, PERRL





- ENT Exam


ENT Exam: Mucous Membranes Moist, Normal Exam





- Neck Exam


Neck Exam: Full ROM, Normal Inspection.  absent: Lymphadenopathy





- Respiratory Exam


Respiratory Exam: Clear to Ausculation Bilateral, NORMAL BREATHING PATTERN





- Cardiovascular Exam


Cardiovascular Exam: REGULAR RHYTHM, +S1, +S2.  absent: Murmur





- GI/Abdominal Exam


GI & Abdominal Exam: Soft, Normal Bowel Sounds.  absent: Tenderness





- Extremities Exam


Extremities Exam: Full ROM, Normal Capillary Refill, Normal Inspection.  absent

: Joint Swelling, Pedal Edema





- Back Exam


Back Exam: NORMAL INSPECTION





- Neurological Exam


Neurological Exam: Alert, Awake, CN II-XII Intact, Normal Gait, Oriented x3





- Psychiatric Exam


Psychiatric exam: Normal Affect, Normal Mood





- Skin


Skin Exam: Dry, Intact, Normal Color, Warm





Assessment and Plan


(1) DVT prophylaxis


Status: Chronic





(2) CAD (coronary artery disease)


Status: Chronic





(3) Smoking


Status: Chronic





(4) Low back pain


Status: Chronic





(5) Scrotal edema


Status: Chronic





(6) Prediabetes


Status: Chronic





(7) Neurocognitive disorder


Status: Chronic








- Assessment and Plan (Free Text)


Assessment: 


cont meds, safety, safe, placement

## 2017-04-17 RX ADMIN — DIVALPROEX SODIUM SCH MG: 500 TABLET, DELAYED RELEASE ORAL at 09:32

## 2017-04-17 RX ADMIN — DIVALPROEX SODIUM SCH MG: 500 TABLET, DELAYED RELEASE ORAL at 16:43

## 2017-04-17 NOTE — CP.PCM.PN
Subjective





- Date & Time of Evaluation


Date of Evaluation: 04/17/17


Time of Evaluation: 07:23





- Subjective


Subjective: 


no complaints/distress


no f/c, n/v/d


pending palcement





Objective





- Vital Signs/Intake and Output


Vital Signs (last 24 hours): 


 











Temp Pulse Resp BP Pulse Ox


 


 98.7 F   77   18   117/74   95 


 


 04/16/17 21:29  04/16/17 21:29  04/16/17 21:29  04/16/17 21:29  04/16/17 21:29











- Medications


Medications: 


 Current Medications





Aspirin (Ecotrin)  81 mg PO DAILY [facility]


   Last Admin: 04/16/17 08:46 Dose:  81 mg


Atorvastatin Calcium (Lipitor)  20 mg PO HS [facility]


   Last Admin: 04/16/17 22:00 Dose:  20 mg


Divalproex Sodium (Depakote Dr(*Bid*))  500 mg PO BID [facility]


   Last Admin: 04/16/17 16:58 Dose:  500 mg


Enalapril Maleate (Vasotec)  10 mg PO DAILY [facility]


   Last Admin: 04/16/17 08:46 Dose:  10 mg


Famotidine (Pepcid)  20 mg PO BID [facility]


   Last Admin: 04/16/17 16:59 Dose:  20 mg


Fenofibrate (Tricor)  145 mg PO DAILY [facility]


   Last Admin: 04/16/17 08:46 Dose:  145 mg


Metoprolol Tartrate (Lopressor)  50 mg PO Q12 [facility]


   Last Admin: 04/16/17 20:55 Dose:  50 mg


Quetiapine Fumarate (Seroquel)  25 mg PO HS [facility]


   Last Admin: 04/16/17 22:00 Dose:  25 mg











- Labs


Labs: 


 





 03/23/17 05:45 





 03/23/17 05:45 





 











PT  11.2 SECONDS (9.4-12.0)   12/14/15  05:20    


 


INR  1.05  (0.89-1.20)   12/14/15  05:20    


 


APTT  36.2 SECONDS (23.1-32.0)  H  12/14/15  05:20    














- Constitutional


Appears: Well, Non-toxic, No Acute Distress





- Head Exam


Head Exam: ATRAUMATIC, NORMAL INSPECTION, NORMOCEPHALIC





- Eye Exam


Eye Exam: EOMI, Normal appearance, PERRL


Pupil Exam: NORMAL ACCOMODATION, PERRL





- ENT Exam


ENT Exam: Mucous Membranes Moist, Normal Exam





- Neck Exam


Neck Exam: Full ROM, Normal Inspection.  absent: Lymphadenopathy





- Respiratory Exam


Respiratory Exam: Clear to Ausculation Bilateral, NORMAL BREATHING PATTERN





- Cardiovascular Exam


Cardiovascular Exam: REGULAR RHYTHM, RRR, +S1, +S2.  absent: Murmur





- GI/Abdominal Exam


GI & Abdominal Exam: Soft, Normal Bowel Sounds.  absent: Tenderness





- Extremities Exam


Extremities Exam: Full ROM, Normal Capillary Refill, Normal Inspection.  absent

: Joint Swelling, Pedal Edema





- Back Exam


Back Exam: NORMAL INSPECTION





- Neurological Exam


Neurological Exam: Alert, Awake, CN II-XII Intact, Normal Gait, Oriented x3





- Psychiatric Exam


Psychiatric exam: Normal Affect, Normal Mood





- Skin


Skin Exam: Dry, Intact, Normal Color, Warm





Assessment and Plan


(1) DVT prophylaxis


Status: Chronic





(2) CAD (coronary artery disease)


Status: Chronic





(3) Smoking


Status: Chronic





(4) Low back pain


Status: Chronic





(5) Scrotal edema


Status: Chronic





(6) Prediabetes


Status: Chronic





(7) Neurocognitive disorder


Status: Chronic








- Assessment and Plan (Free Text)


Assessment: 


cont meds, placement, interventions, safety, sw

## 2017-04-18 RX ADMIN — DIVALPROEX SODIUM SCH MG: 500 TABLET, DELAYED RELEASE ORAL at 16:46

## 2017-04-18 RX ADMIN — DIVALPROEX SODIUM SCH MG: 500 TABLET, DELAYED RELEASE ORAL at 10:26

## 2017-04-18 NOTE — CP.PCM.PN
Subjective





- Date & Time of Evaluation


Date of Evaluation: 04/18/17


Time of Evaluation: 07:36





- Subjective


Subjective: 


no complaints/distress


no f/c, n/v/d


pneidng placement





Objective





- Vital Signs/Intake and Output


Vital Signs (last 24 hours): 


 











Temp Pulse Resp BP Pulse Ox


 


 98.3 F   85   20   132/76   97 


 


 04/17/17 16:26  04/17/17 21:04  04/17/17 16:26  04/17/17 21:04  04/17/17 16:26











- Medications


Medications: 


 Current Medications





Aspirin (Ecotrin)  [facility]ILY [facility]


   Last Admin: 04/17/17 09:32 Dose:  81 mg


Atorvastatin Calcium (Lipitor)  20 mg PO HS [facility]


   Last Admin: 04/17/17 21:05 Dose:  20 mg


Divalproex Sodium (Depakote Dr(*Bid*))  500 mg PO BID [facility]


   Last Admin: 04/17/17 16:43 Dose:  500 mg


Enalapril Maleate (Vasotec)  10 mg PO DAILY [facility]


   Last Admin: 04/17/17 09:35 Dose:  10 mg


Famotidine (Pepcid)  20 mg PO BID [facility]


   Last Admin: 04/17/17 16:43 Dose:  20 mg


Fenofibrate (Tricor)  145 mg PO DAILY [facility]


   Last Admin: 04/17/17 09:34 Dose:  145 mg


Metoprolol Tartrate (Lopressor)  50 mg PO Q12 [facility]


   Last Admin: 04/17/17 21:04 Dose:  50 mg


Quetiapine Fumarate (Seroquel)  25 mg PO HS [facility]


   Last Admin: 04/17/17 21:05 Dose:  25 mg











- Labs


Labs: 


 





 03/23/17 05:45 





 03/23/17 05:45 





 











PT  11.2 SECONDS (9.4-12.0)   12/14/15  05:20    


 


INR  1.05  (0.89-1.20)   12/14/15  05:20    


 


APTT  36.2 SECONDS (23.1-32.0)  H  12/14/15  05:20    














- Constitutional


Appears: Well, Non-toxic, No Acute Distress





- Head Exam


Head Exam: ATRAUMATIC, NORMAL INSPECTION, NORMOCEPHALIC





- Eye Exam


Eye Exam: EOMI, Normal appearance, PERRL


Pupil Exam: NORMAL ACCOMODATION, PERRL





- ENT Exam


ENT Exam: Mucous Membranes Moist, Normal Exam





- Neck Exam


Neck Exam: Full ROM, Normal Inspection.  absent: Lymphadenopathy





- Respiratory Exam


Respiratory Exam: Clear to Ausculation Bilateral, NORMAL BREATHING PATTERN





- Cardiovascular Exam


Cardiovascular Exam: REGULAR RHYTHM, RRR, +S1, +S2.  absent: Murmur





- GI/Abdominal Exam


GI & Abdominal Exam: Soft, Normal Bowel Sounds.  absent: Tenderness





- Extremities Exam


Extremities Exam: Full ROM, Normal Capillary Refill, Normal Inspection.  absent

: Joint Swelling, Pedal Edema





- Back Exam


Back Exam: NORMAL INSPECTION





- Neurological Exam


Neurological Exam: Alert, Awake, CN II-XII Intact, Normal Gait, Oriented x3





- Psychiatric Exam


Psychiatric exam: Normal Affect, Normal Mood





- Skin


Skin Exam: Dry, Intact, Normal Color, Warm





Assessment and Plan


(1) DVT prophylaxis


Status: Chronic





(2) CAD (coronary artery disease)


Status: Chronic





(3) Smoking


Status: Chronic





(4) Low back pain


Status: Chronic





(5) Scrotal edema


Status: Chronic





(6) Prediabetes


Status: Chronic





(7) Neurocognitive disorder


Status: Chronic








- Assessment and Plan (Free Text)


Assessment: 


cont meds, interventions, sw, safety, placement

## 2017-04-19 RX ADMIN — DIVALPROEX SODIUM SCH MG: 500 TABLET, DELAYED RELEASE ORAL at 16:19

## 2017-04-19 RX ADMIN — DIVALPROEX SODIUM SCH MG: 500 TABLET, DELAYED RELEASE ORAL at 08:30

## 2017-04-19 NOTE — CP.PCM.PN
Subjective





- Date & Time of Evaluation


Date of Evaluation: 04/19/17


Time of Evaluation: 08:29





- Subjective


Subjective: 


no complaints/distress


no f/c, n/v/d.


pending placement





Objective





- Vital Signs/Intake and Output


Vital Signs (last 24 hours): 


 











Temp Pulse Resp BP Pulse Ox


 


 97.5 F L  62   18   96/64 L  98 


 


 04/19/17 08:19  04/19/17 08:19  04/19/17 08:19  04/19/17 08:19  04/19/17 08:19











- Medications


Medications: 


 Current Medications





Aspirin (Ecotrin)  81 mg PO DAILY [facility]


   Last Admin: 04/18/17 10:26 Dose:  81 mg


Atorvastatin Calcium (Lipitor)  20 mg PO HS [facility]


   Last Admin: 04/18/17 21:19 Dose:  20 mg


Divalproex Sodium (Depakote Dr(*Bid*))  500 mg PO BID [facility]


   Last Admin: 04/18/17 16:46 Dose:  500 mg


Enalapril Maleate (Vasotec)  10 mg PO DAILY [facility]


   Last Admin: 04/18/17 10:27 Dose:  10 mg


Famotidine (Pepcid)  20 mg PO BID [facility]


   Last Admin: 04/18/17 16:46 Dose:  20 mg


Fenofibrate (Tricor)  145 mg PO DAILY [facility]


   Last Admin: 04/18/17 10:27 Dose:  145 mg


Metoprolol Tartrate (Lopressor)  50 mg PO Q12 [facility]


   Last Admin: 04/18/17 21:18 Dose:  50 mg


Quetiapine Fumarate (Seroquel)  25 mg PO HS [facility]


   Last Admin: 04/18/17 21:19 Dose:  25 mg











- Labs


Labs: 


 





 03/23/17 05:45 





 03/23/17 05:45 





 











PT  11.2 SECONDS (9.4-12.0)   12/14/15  05:20    


 


INR  1.05  (0.89-1.20)   12/14/15  05:20    


 


APTT  36.2 SECONDS (23.1-32.0)  H  12/14/15  05:20    














- Constitutional


Appears: Well, Non-toxic, No Acute Distress





- Head Exam


Head Exam: ATRAUMATIC, NORMAL INSPECTION, NORMOCEPHALIC





- Eye Exam


Eye Exam: EOMI, Normal appearance, PERRL


Pupil Exam: NORMAL ACCOMODATION, PERRL





- ENT Exam


ENT Exam: Mucous Membranes Moist, Normal Exam





- Neck Exam


Neck Exam: Full ROM, Normal Inspection.  absent: Lymphadenopathy





- Respiratory Exam


Respiratory Exam: Clear to Ausculation Bilateral, NORMAL BREATHING PATTERN





- Cardiovascular Exam


Cardiovascular Exam: REGULAR RHYTHM, +S1, +S2.  absent: Murmur





- GI/Abdominal Exam


GI & Abdominal Exam: Soft, Normal Bowel Sounds.  absent: Tenderness





- Extremities Exam


Extremities Exam: Full ROM, Normal Capillary Refill, Normal Inspection.  absent

: Joint Swelling, Pedal Edema





- Back Exam


Back Exam: NORMAL INSPECTION





- Neurological Exam


Neurological Exam: Alert, Awake, CN II-XII Intact, Normal Gait, Oriented x3





- Psychiatric Exam


Psychiatric exam: Normal Affect, Normal Mood





- Skin


Skin Exam: Dry, Intact, Normal Color, Warm





Assessment and Plan


(1) DVT prophylaxis


Status: Chronic





(2) CAD (coronary artery disease)


Status: Chronic





(3) Smoking


Status: Chronic





(4) Low back pain


Status: Chronic





(5) Scrotal edema


Status: Chronic





(6) Prediabetes


Status: Chronic





(7) Neurocognitive disorder


Status: Chronic








- Assessment and Plan (Free Text)


Assessment: 


cont meds interventions, sw, placement, safety

## 2017-04-20 RX ADMIN — DIVALPROEX SODIUM SCH MG: 500 TABLET, DELAYED RELEASE ORAL at 09:05

## 2017-04-20 RX ADMIN — DIVALPROEX SODIUM SCH MG: 500 TABLET, DELAYED RELEASE ORAL at 17:19

## 2017-04-20 NOTE — CP.PCM.PN
Subjective





- Date & Time of Evaluation


Date of Evaluation: 04/20/17


Time of Evaluation: 07:15





- Subjective


Subjective: 


no f/c, n/v/d


pneidn gplacement


no complaints/distress








Objective





- Vital Signs/Intake and Output


Vital Signs (last 24 hours): 


 











Temp Pulse Resp BP Pulse Ox


 


 98.3 F   72   20   100/65   98 


 


 04/19/17 20:15  04/19/17 21:24  04/19/17 20:15  04/19/17 21:24  04/19/17 20:15











- Medications


Medications: 


 Current Medications





Aspirin (Ecotrin)  81 mg PO DAILY [facility]


   Last Admin: 04/19/17 08:30 Dose:  81 mg


Atorvastatin Calcium (Lipitor)  20 mg PO HS [facility]


   Last Admin: 04/19/17 21:24 Dose:  20 mg


Divalproex Sodium (Depakote Dr(*Bid*))  500 mg PO BID [facility]


   Last Admin: 04/19/17 16:19 Dose:  500 mg


Enalapril Maleate (Vasotec)  10 mg PO DAILY [facility]


   Last Admin: 04/19/17 08:30 Dose:  10 mg


Famotidine (Pepcid)  20 mg PO BID [facility]


   Last Admin: 04/19/17 16:19 Dose:  20 mg


Fenofibrate (Tricor)  145 mg PO DAILY [facility]


   Last Admin: 04/19/17 08:30 Dose:  145 mg


Metoprolol Tartrate (Lopressor)  50 mg PO Q12 [facility]


   Last Admin: 04/19/17 21:24 Dose:  50 mg


Quetiapine Fumarate (Seroquel)  25 mg PO HS [facility]


   Last Admin: 04/19/17 21:24 Dose:  25 mg











- Labs


Labs: 


 





 03/23/17 05:45 





 03/23/17 05:45 





 











PT  11.2 SECONDS (9.4-12.0)   12/14/15  05:20    


 


INR  1.05  (0.89-1.20)   12/14/15  05:20    


 


APTT  36.2 SECONDS (23.1-32.0)  H  12/14/15  05:20    














- Constitutional


Appears: Well, Non-toxic, No Acute Distress





- Head Exam


Head Exam: ATRAUMATIC, NORMAL INSPECTION, NORMOCEPHALIC





- Eye Exam


Eye Exam: EOMI, Normal appearance, PERRL


Pupil Exam: NORMAL ACCOMODATION, PERRL





- ENT Exam


ENT Exam: Mucous Membranes Moist, Normal Exam





- Neck Exam


Neck Exam: Full ROM, Normal Inspection.  absent: Lymphadenopathy





- Respiratory Exam


Respiratory Exam: Clear to Ausculation Bilateral, NORMAL BREATHING PATTERN





- Cardiovascular Exam


Cardiovascular Exam: REGULAR RHYTHM, RRR, +S1, +S2.  absent: Murmur





- GI/Abdominal Exam


GI & Abdominal Exam: Soft, Normal Bowel Sounds.  absent: Tenderness





- Extremities Exam


Extremities Exam: Full ROM, Normal Capillary Refill, Normal Inspection.  absent

: Joint Swelling, Pedal Edema





- Back Exam


Back Exam: NORMAL INSPECTION





- Neurological Exam


Neurological Exam: Alert, Awake, CN II-XII Intact, Normal Gait, Oriented x3





- Psychiatric Exam


Psychiatric exam: Normal Affect, Normal Mood





- Skin


Skin Exam: Dry, Intact, Normal Color, Warm





Assessment and Plan


(1) DVT prophylaxis


Status: Chronic





(2) CAD (coronary artery disease)


Status: Chronic





(3) Smoking


Status: Chronic





(4) Low back pain


Status: Chronic





(5) Scrotal edema


Status: Chronic





(6) Prediabetes


Status: Chronic





(7) Neurocognitive disorder


Status: Chronic








- Assessment and Plan (Free Text)


Assessment: 


cont meds,safety intervention, placement

## 2017-04-21 RX ADMIN — DIVALPROEX SODIUM SCH MG: 500 TABLET, DELAYED RELEASE ORAL at 16:37

## 2017-04-21 RX ADMIN — DIVALPROEX SODIUM SCH MG: 500 TABLET, DELAYED RELEASE ORAL at 10:37

## 2017-04-21 NOTE — PN
DATE: 04/21/2017



The patient evaluated in bed.  No complaints.  No distress.  No fever, chills, 
nausea, vomiting, diarrhea.  The patient remains pending placement.



REVIEW OF SYSTEMS:  Negative.



PHYSICAL EXAMINATION:

GENERAL:  Alert and oriented to his baseline.

HEART:  Regular rate and rhythm.  No murmurs, rubs, or gallops.

LUNGS:  Clear in all fields bilaterally.

ABDOMEN:  Soft, nontender, bowel sounds x 4.

EXTREMITIES:  Distal pulses, motor and sensation intact.  Cap refill is brisk.



DIAGNOSES AND PLAN:  Coronary artery disease.  Continue current medicines .  
Continue safety and social work intervention for placement.  Deep venous 
thrombosis.  Sequential compression devices, anti-embolism hose.  We will 
continue to monitor the patient for safety and continue work on placement and 
guardianship.





__________________________________________

Fan SILVER







cc:   



DD: 04/21/2017 13:08:04  [id number]

TT: 04/21/2017 13:23:53

Confirmation # [id number]

Dictation # [id number]

en

MTDD

## 2017-04-21 NOTE — CP.PCM.PN
Subjective





- Date & Time of Evaluation


Date of Evaluation: 04/21/17


Time of Evaluation: 08:38





- Subjective


Subjective: 


no f/c, n/v/d


penidng palcement





Objective





- Vital Signs/Intake and Output


Vital Signs (last 24 hours): 


 











Temp Pulse Resp BP Pulse Ox


 


 98.1 F   66   20   100/65   95 


 


 04/21/17 07:33  04/21/17 07:33  04/21/17 07:33  04/21/17 07:33  04/21/17 07:33











- Medications


Medications: 


 Current Medications





Aspirin (Ecotrin)  81 mg PO DAILY [facility]


   Last Admin: 04/20/17 09:04 Dose:  81 mg


Atorvastatin Calcium (Lipitor)  20 mg PO HS [facility]


   Last Admin: 04/20/17 21:30 Dose:  20 mg


Divalproex Sodium (Depakote Dr(*Bid*))  500 mg PO BID [facility]


   Last Admin: 04/20/17 17:19 Dose:  500 mg


Enalapril Maleate (Vasotec)  10 mg PO DAILY [facility]


   Last Admin: 04/20/17 09:04 Dose:  10 mg


Famotidine (Pepcid)  20 mg PO BID [facility]


   Last Admin: 04/20/17 17:19 Dose:  20 mg


Fenofibrate (Tricor)  145 mg PO DAILY [facility]


   Last Admin: 04/20/17 09:04 Dose:  145 mg


Metoprolol Tartrate (Lopressor)  50 mg PO Q12 [facility]


   Last Admin: 04/20/17 21:30 Dose:  50 mg


Quetiapine Fumarate (Seroquel)  25 mg PO HS [facility]


   Last Admin: 04/20/17 21:30 Dose:  25 mg











- Labs


Labs: 


 





 03/23/17 05:45 





 03/23/17 05:45 





 











PT  11.2 SECONDS (9.4-12.0)   12/14/15  05:20    


 


INR  1.05  (0.89-1.20)   12/14/15  05:20    


 


APTT  36.2 SECONDS (23.1-32.0)  H  12/14/15  05:20    














Assessment and Plan


(1) DVT prophylaxis


Status: Chronic





(2) CAD (coronary artery disease)


Status: Chronic





(3) Smoking


Status: Chronic





(4) Low back pain


Status: Chronic





(5) Scrotal edema


Status: Chronic





(6) Prediabetes


Status: Chronic





(7) Neurocognitive disorder


Status: Chronic

## 2017-04-22 RX ADMIN — DIVALPROEX SODIUM SCH MG: 500 TABLET, DELAYED RELEASE ORAL at 09:39

## 2017-04-22 RX ADMIN — DIVALPROEX SODIUM SCH MG: 500 TABLET, DELAYED RELEASE ORAL at 17:34

## 2017-04-22 NOTE — CP.PCM.PN
Subjective





- Date & Time of Evaluation


Date of Evaluation: 04/22/17


Time of Evaluation: 08:58





- Subjective


Subjective: 


no complaints/distress


no f/c, n/v/d


peidng placement





Objective





- Vital Signs/Intake and Output


Vital Signs (last 24 hours): 


 











Temp Pulse Resp BP Pulse Ox


 


 98.4 F   67   20   101/69   93 L


 


 04/22/17 08:07  04/22/17 08:07  04/22/17 08:07  04/22/17 08:07  04/22/17 08:07











- Medications


Medications: 


 Current Medications





Aspirin (Ecotrin)  81 mg PO DAILY [facility]


   Last Admin: 04/21/17 10:37 Dose:  81 mg


Atorvastatin Calcium (Lipitor)  20 mg PO HS [facility]


   Last Admin: 04/21/17 21:58 Dose:  20 mg


Divalproex Sodium (Depakote Dr(*Bid*))  500 mg PO BID [facility]


   Last Admin: 04/21/17 16:37 Dose:  500 mg


Enalapril Maleate (Vasotec)  10 mg PO DAILY [facility]


   Last Admin: 04/21/17 10:37 Dose:  10 mg


Famotidine (Pepcid)  20 mg PO BID [facility]


   Last Admin: 04/21/17 16:37 Dose:  20 mg


Fenofibrate (Tricor)  145 mg PO DAILY [facility]


   Last Admin: 04/21/17 10:37 Dose:  145 mg


Metoprolol Tartrate (Lopressor)  50 mg PO Q12 [facility]


   Last Admin: 04/21/17 21:58 Dose:  50 mg


Quetiapine Fumarate (Seroquel)  25 mg PO HS [facility]


   Last Admin: 04/21/17 21:58 Dose:  25 mg











- Labs


Labs: 


 





 03/23/17 05:45 





 03/23/17 05:45 





 











PT  11.2 SECONDS (9.4-12.0)   12/14/15  05:20    


 


INR  1.05  (0.89-1.20)   12/14/15  05:20    


 


APTT  36.2 SECONDS (23.1-32.0)  H  12/14/15  05:20    














- Constitutional


Appears: Well, Non-toxic, No Acute Distress





- Head Exam


Head Exam: ATRAUMATIC, NORMAL INSPECTION, NORMOCEPHALIC





- Eye Exam


Eye Exam: EOMI, Normal appearance, PERRL


Pupil Exam: NORMAL ACCOMODATION, PERRL





- ENT Exam


ENT Exam: Mucous Membranes Moist, Normal Exam





- Neck Exam


Neck Exam: Full ROM, Normal Inspection.  absent: Lymphadenopathy





- Respiratory Exam


Respiratory Exam: Clear to Ausculation Bilateral, NORMAL BREATHING PATTERN





- Cardiovascular Exam


Cardiovascular Exam: REGULAR RHYTHM, RRR, +S1, +S2.  absent: Murmur





- GI/Abdominal Exam


GI & Abdominal Exam: Soft, Normal Bowel Sounds.  absent: Tenderness





- Rectal Exam


Rectal Exam: NORMAL INSPECTION





- Extremities Exam


Extremities Exam: Full ROM, Normal Capillary Refill, Normal Inspection.  absent

: Joint Swelling, Pedal Edema





- Back Exam


Back Exam: NORMAL INSPECTION





- Neurological Exam


Neurological Exam: Alert, Awake, CN II-XII Intact, Normal Gait, Oriented x3





- Psychiatric Exam


Psychiatric exam: Normal Affect, Normal Mood





- Skin


Skin Exam: Dry, Intact, Normal Color, Warm





Assessment and Plan


(1) DVT prophylaxis


Status: Chronic





(2) CAD (coronary artery disease)


Status: Chronic





(3) Smoking


Status: Chronic





(4) Low back pain


Status: Chronic





(5) Scrotal edema


Status: Chronic





(6) Prediabetes


Status: Chronic





(7) Neurocognitive disorder


Status: Chronic








- Assessment and Plan (Free Text)


Plan: 


cont meds, placement, safety

## 2017-04-23 RX ADMIN — DIVALPROEX SODIUM SCH MG: 250 TABLET, DELAYED RELEASE ORAL at 17:17

## 2017-04-23 RX ADMIN — DIVALPROEX SODIUM SCH MG: 500 TABLET, DELAYED RELEASE ORAL at 09:39

## 2017-04-23 RX ADMIN — DIVALPROEX SODIUM SCH MG: 250 TABLET, DELAYED RELEASE ORAL at 10:00

## 2017-04-23 NOTE — CP.PCM.PN
Subjective





- Date & Time of Evaluation


Date of Evaluation: 04/23/17


Time of Evaluation: 09:25





- Subjective


Subjective: 


no f/c, n/v/d


pending placement


no complaints/distress





Objective





- Vital Signs/Intake and Output


Vital Signs (last 24 hours): 


 











Temp Pulse Resp BP Pulse Ox


 


 97.6 F   67   20   113/78   97 


 


 04/23/17 07:53  04/23/17 07:53  04/23/17 07:53  04/23/17 07:53  04/23/17 07:53











- Medications


Medications: 


 Current Medications





Aspirin (Ecotrin)  81 mg PO DAILY [facility]


   Last Admin: 04/22/17 09:47 Dose:  81 mg


Atorvastatin Calcium (Lipitor)  20 mg PO HS [facility]


   Last Admin: 04/22/17 21:24 Dose:  20 mg


Divalproex Sodium (Depakote Dr(*Bid*))  500 mg PO BID [facility]


   Last Admin: 04/22/17 17:34 Dose:  500 mg


Enalapril Maleate (Vasotec)  10 mg PO DAILY [facility]


   Last Admin: 04/22/17 09:47 Dose:  10 mg


Famotidine (Pepcid)  20 mg PO BID [facility]


   Last Admin: 04/22/17 17:34 Dose:  20 mg


Fenofibrate (Tricor)  145 mg PO DAILY [facility]


   Last Admin: 04/22/17 09:39 Dose:  145 mg


Metoprolol Tartrate (Lopressor)  50 mg PO Q12 [facility]


   Last Admin: 04/22/17 21:22 Dose:  50 mg


Quetiapine Fumarate (Seroquel)  25 mg PO HS [facility]


   Last Admin: 04/22/17 21:24 Dose:  25 mg











- Labs


Labs: 


 





 03/23/17 05:45 





 03/23/17 05:45 





 











PT  11.2 SECONDS (9.4-12.0)   12/14/15  05:20    


 


INR  1.05  (0.89-1.20)   12/14/15  05:20    


 


APTT  36.2 SECONDS (23.1-32.0)  H  12/14/15  05:20    














- Constitutional


Appears: Well, Non-toxic, No Acute Distress





- Head Exam


Head Exam: ATRAUMATIC, NORMAL INSPECTION, NORMOCEPHALIC





- Eye Exam


Eye Exam: EOMI, Normal appearance, PERRL


Pupil Exam: NORMAL ACCOMODATION, PERRL





- ENT Exam


ENT Exam: Mucous Membranes Moist, Normal Exam





- Neck Exam


Neck Exam: Full ROM, Normal Inspection.  absent: Lymphadenopathy





- Respiratory Exam


Respiratory Exam: Clear to Ausculation Bilateral, NORMAL BREATHING PATTERN





- Cardiovascular Exam


Cardiovascular Exam: REGULAR RHYTHM, +S1, +S2.  absent: Murmur





- GI/Abdominal Exam


GI & Abdominal Exam: Soft, Normal Bowel Sounds.  absent: Tenderness





- Extremities Exam


Extremities Exam: Full ROM, Normal Capillary Refill, Normal Inspection.  absent

: Joint Swelling, Pedal Edema





- Back Exam


Back Exam: NORMAL INSPECTION





- Neurological Exam


Neurological Exam: Alert, Awake, CN II-XII Intact, Normal Gait, Oriented x3





- Psychiatric Exam


Psychiatric exam: Normal Affect, Normal Mood





- Skin


Skin Exam: Dry, Intact, Normal Color, Warm





Assessment and Plan


(1) DVT prophylaxis


Status: Chronic





(2) CAD (coronary artery disease)


Status: Chronic





(3) Smoking


Status: Chronic





(4) Low back pain


Status: Chronic





(5) Scrotal edema


Status: Chronic





(6) Prediabetes


Status: Chronic





(7) Neurocognitive disorder


Status: Chronic








- Assessment and Plan (Free Text)


Assessment: 


cont placement, safety, meds, interventions

## 2017-04-24 RX ADMIN — DIVALPROEX SODIUM SCH MG: 250 TABLET, DELAYED RELEASE ORAL at 17:10

## 2017-04-24 RX ADMIN — DIVALPROEX SODIUM SCH MG: 250 TABLET, DELAYED RELEASE ORAL at 09:13

## 2017-04-24 NOTE — CP.PCM.PN
Subjective





- Date & Time of Evaluation


Date of Evaluation: 04/24/17


Time of Evaluation: 07:21





- Subjective


Subjective: 


pt in no distress/complaitns


no f/c, n/v/d


pendin gplacement





Objective





- Vital Signs/Intake and Output


Vital Signs (last 24 hours): 


 











Temp Pulse Resp BP Pulse Ox


 


 98.2 F   64   20   124/76   94 L


 


 04/23/17 21:06  04/23/17 21:06  04/23/17 21:06  04/23/17 21:06  04/23/17 21:06











- Medications


Medications: 


 Current Medications





Aspirin (Ecotrin)  81 mg PO DAILY [facility]


   Last Admin: 04/23/17 09:39 Dose:  81 mg


Atorvastatin Calcium (Lipitor)  20 mg PO HS [facility]


   Last Admin: 04/23/17 21:02 Dose:  20 mg


Divalproex Sodium (Depakote Dr(*Bid*))  500 mg PO BID [facility]


   Last Admin: 04/23/17 17:17 Dose:  500 mg


Enalapril Maleate (Vasotec)  10 mg PO DAILY [facility]


   Last Admin: 04/23/17 09:40 Dose:  10 mg


Famotidine (Pepcid)  20 mg PO BID [facility]


   Last Admin: 04/23/17 17:17 Dose:  20 mg


Fenofibrate (Tricor)  145 mg PO DAILY [facility]


   Last Admin: 04/23/17 09:40 Dose:  145 mg


Metoprolol Tartrate (Lopressor)  50 mg PO Q12 [facility]


   Last Admin: 04/23/17 20:46 Dose:  50 mg


Quetiapine Fumarate (Seroquel)  25 mg PO HS [facility]


   Last Admin: 04/23/17 21:02 Dose:  25 mg











- Labs


Labs: 


 





 03/23/17 05:45 





 03/23/17 05:45 





 











PT  11.2 SECONDS (9.4-12.0)   12/14/15  05:20    


 


INR  1.05  (0.89-1.20)   12/14/15  05:20    


 


APTT  36.2 SECONDS (23.1-32.0)  H  12/14/15  05:20    














- Constitutional


Appears: Well, Non-toxic, No Acute Distress





- Head Exam


Head Exam: ATRAUMATIC, NORMAL INSPECTION, NORMOCEPHALIC





- Eye Exam


Eye Exam: EOMI, Normal appearance, PERRL


Pupil Exam: NORMAL ACCOMODATION, PERRL





- ENT Exam


ENT Exam: Mucous Membranes Moist, Normal Exam





- Neck Exam


Neck Exam: Full ROM, Normal Inspection.  absent: Lymphadenopathy





- Respiratory Exam


Respiratory Exam: Clear to Ausculation Bilateral, NORMAL BREATHING PATTERN





- Cardiovascular Exam


Cardiovascular Exam: REGULAR RHYTHM, RRR, +S1, +S2.  absent: Murmur





- GI/Abdominal Exam


GI & Abdominal Exam: Soft, Normal Bowel Sounds.  absent: Tenderness





- Extremities Exam


Extremities Exam: Full ROM, Normal Capillary Refill, Normal Inspection.  absent

: Joint Swelling, Pedal Edema





- Back Exam


Back Exam: NORMAL INSPECTION





- Neurological Exam


Neurological Exam: Alert, Awake, CN II-XII Intact, Normal Gait, Oriented x3





- Psychiatric Exam


Psychiatric exam: Normal Affect, Normal Mood





- Skin


Skin Exam: Dry, Intact, Normal Color, Warm





Assessment and Plan


(1) DVT prophylaxis


Status: Chronic





(2) CAD (coronary artery disease)


Status: Chronic





(3) Smoking


Status: Chronic





(4) Low back pain


Status: Chronic





(5) Scrotal edema


Status: Chronic





(6) Prediabetes


Status: Chronic





(7) Neurocognitive disorder


Status: Chronic








- Assessment and Plan (Free Text)


Assessment: 


cont meds, safety, placement

## 2017-04-25 RX ADMIN — DIVALPROEX SODIUM SCH MG: 250 TABLET, DELAYED RELEASE ORAL at 09:06

## 2017-04-25 RX ADMIN — DIVALPROEX SODIUM SCH MG: 250 TABLET, DELAYED RELEASE ORAL at 18:04

## 2017-04-25 NOTE — CP.PCM.PN
Subjective





- Date & Time of Evaluation


Date of Evaluation: 04/25/17


Time of Evaluation: 07:40





- Subjective


Subjective: 





no complaints/distres


no f/c, n/v/d


pneidn gpalcement





Objective





- Vital Signs/Intake and Output


Vital Signs (last 24 hours): 


 











Temp Pulse Resp BP Pulse Ox


 


 98.6 F   68   20   108/73   96 


 


 04/24/17 20:49  04/24/17 21:00  04/24/17 20:49  04/24/17 21:00  04/24/17 20:49











- Medications


Medications: 


 Current Medications





Aspirin (Ecotrin)  81 mg PO DAILY [facility]


   Last Admin: 04/24/17 09:13 Dose:  81 mg


Atorvastatin Calcium (Lipitor)  20 mg PO HS [facility]


   Last Admin: 04/24/17 23:26 Dose:  20 mg


Divalproex Sodium (Depakote Dr(*Bid*))  500 mg PO BID [facility]


   Last Admin: 04/24/17 17:10 Dose:  500 mg


Enalapril Maleate (Vasotec)  10 mg PO DAILY [facility]


   Last Admin: 04/24/17 09:14 Dose:  10 mg


Famotidine (Pepcid)  20 mg PO BID [facility]


   Last Admin: 04/24/17 17:10 Dose:  20 mg


Fenofibrate (Tricor)  145 mg PO DAILY [facility]


   Last Admin: 04/24/17 09:14 Dose:  145 mg


Metoprolol Tartrate (Lopressor)  50 mg PO Q12 [facility]


   Last Admin: 04/24/17 21:00 Dose:  50 mg


Quetiapine Fumarate (Seroquel)  25 mg PO HS [facility]


   Last Admin: 04/24/17 23:27 Dose:  25 mg











- Labs


Labs: 


 





 03/23/17 05:45 





 03/23/17 05:45 





 











PT  11.2 SECONDS (9.4-12.0)   12/14/15  05:20    


 


INR  1.05  (0.89-1.20)   12/14/15  05:20    


 


APTT  36.2 SECONDS (23.1-32.0)  H  12/14/15  05:20    














- Constitutional


Appears: Well, Non-toxic, No Acute Distress





- Head Exam


Head Exam: ATRAUMATIC, NORMAL INSPECTION, NORMOCEPHALIC





- Eye Exam


Eye Exam: EOMI, Normal appearance, PERRL


Pupil Exam: NORMAL ACCOMODATION, PERRL





- ENT Exam


ENT Exam: Mucous Membranes Moist, Normal Exam





- Neck Exam


Neck Exam: Full ROM, Normal Inspection.  absent: Lymphadenopathy





- Respiratory Exam


Respiratory Exam: Clear to Ausculation Bilateral, NORMAL BREATHING PATTERN





- Cardiovascular Exam


Cardiovascular Exam: REGULAR RHYTHM, +S1, +S2.  absent: Murmur





- GI/Abdominal Exam


GI & Abdominal Exam: Soft, Normal Bowel Sounds.  absent: Tenderness





- Extremities Exam


Extremities Exam: Full ROM, Normal Capillary Refill, Normal Inspection.  absent

: Joint Swelling, Pedal Edema





- Back Exam


Back Exam: NORMAL INSPECTION





- Neurological Exam


Neurological Exam: Alert, Awake, CN II-XII Intact, Normal Gait, Oriented x3





- Psychiatric Exam


Psychiatric exam: Normal Affect, Normal Mood





- Skin


Skin Exam: Dry, Intact, Normal Color, Warm





Assessment and Plan


(1) DVT prophylaxis


Status: Chronic   





(2) Scrotal edema


Status: Chronic   





(3) CAD (coronary artery disease)


Status: Chronic   





(4) Smoking


Status: Chronic   





(5) Low back pain


Status: Chronic   





(6) Prediabetes


Status: Chronic   





(7) Neurocognitive disorder


Status: Chronic   





- Assessment and Plan (Free Text)


Assessment: 





cont meds, interventions, safety, placemnt

## 2017-04-26 RX ADMIN — DIVALPROEX SODIUM SCH MG: 250 TABLET, DELAYED RELEASE ORAL at 16:47

## 2017-04-26 RX ADMIN — DIVALPROEX SODIUM SCH MG: 250 TABLET, DELAYED RELEASE ORAL at 10:04

## 2017-04-26 NOTE — CP.PCM.PN
Subjective





- Date & Time of Evaluation


Date of Evaluation: 04/26/17


Time of Evaluation: 07:14





- Subjective


Subjective: 





no complaints/distress


no f/c, n/v/d


pending palcement





Objective





- Vital Signs/Intake and Output


Vital Signs (last 24 hours): 


 











Temp Pulse Resp BP Pulse Ox


 


 98.5 F   79   20   127/84   97 


 


 04/25/17 20:33  04/25/17 21:21  04/25/17 20:33  04/25/17 21:21  04/25/17 20:33











- Medications


Medications: 


 Current Medications





Aspirin (Ecotrin)  81 mg PO DAILY [facility]


   Last Admin: 04/25/17 09:05 Dose:  81 mg


Atorvastatin Calcium (Lipitor)  20 mg PO HS [facility]


   Last Admin: 04/25/17 21:21 Dose:  20 mg


Divalproex Sodium (Depakote Dr(*Bid*))  500 mg PO BID [facility]


   Last Admin: 04/25/17 18:04 Dose:  500 mg


Enalapril Maleate (Vasotec)  10 mg PO DAILY [facility]


   Last Admin: 04/25/17 09:05 Dose:  10 mg


Famotidine (Pepcid)  20 mg PO BID [facility]


   Last Admin: 04/25/17 18:04 Dose:  20 mg


Fenofibrate (Tricor)  145 mg PO DAILY [facility]


   Last Admin: 04/25/17 09:05 Dose:  145 mg


Metoprolol Tartrate (Lopressor)  50 mg PO Q12 [facility]


   Last Admin: 04/25/17 21:21 Dose:  50 mg


Quetiapine Fumarate (Seroquel)  25 mg PO HS [facility]


   Last Admin: 04/25/17 21:21 Dose:  25 mg











- Labs


Labs: 


 





 03/23/17 05:45 





 03/23/17 05:45 





 











PT  11.2 SECONDS (9.4-12.0)   12/14/15  05:20    


 


INR  1.05  (0.89-1.20)   12/14/15  05:20    


 


APTT  36.2 SECONDS (23.1-32.0)  H  12/14/15  05:20    














- Constitutional


Appears: Well, Non-toxic, No Acute Distress





- Head Exam


Head Exam: ATRAUMATIC, NORMAL INSPECTION, NORMOCEPHALIC





- Eye Exam


Eye Exam: EOMI, Normal appearance, PERRL


Pupil Exam: NORMAL ACCOMODATION, PERRL





- ENT Exam


ENT Exam: Mucous Membranes Moist, Normal Exam





- Neck Exam


Neck Exam: Full ROM, Normal Inspection.  absent: Lymphadenopathy





- Respiratory Exam


Respiratory Exam: Clear to Ausculation Bilateral, NORMAL BREATHING PATTERN





- Cardiovascular Exam


Cardiovascular Exam: REGULAR RHYTHM, RRR, +S1, +S2.  absent: Murmur





- GI/Abdominal Exam


GI & Abdominal Exam: Soft, Normal Bowel Sounds.  absent: Tenderness





- Extremities Exam


Extremities Exam: Full ROM, Normal Capillary Refill, Normal Inspection.  absent

: Joint Swelling, Pedal Edema





- Back Exam


Back Exam: NORMAL INSPECTION





- Neurological Exam


Neurological Exam: Alert, Awake, CN II-XII Intact, Normal Gait, Oriented x3





- Psychiatric Exam


Psychiatric exam: Normal Affect, Normal Mood





- Skin


Skin Exam: Dry, Intact, Normal Color, Warm





Assessment and Plan


(1) DVT prophylaxis


Status: Chronic   





(2) Scrotal edema


Status: Chronic   





(3) CAD (coronary artery disease)


Status: Chronic   





(4) Smoking


Status: Chronic   





(5) Low back pain


Status: Chronic   





(6) Prediabetes


Status: Chronic   





(7) Neurocognitive disorder


Status: Chronic   





- Assessment and Plan (Free Text)


Assessment: 





cont meds, ienterverntions, safety, placement

## 2017-04-27 RX ADMIN — DIVALPROEX SODIUM SCH MG: 250 TABLET, DELAYED RELEASE ORAL at 09:56

## 2017-04-27 RX ADMIN — DIVALPROEX SODIUM SCH MG: 250 TABLET, DELAYED RELEASE ORAL at 16:16

## 2017-04-27 NOTE — CP.PCM.PN
Subjective





- Date & Time of Evaluation


Date of Evaluation: 04/27/17


Time of Evaluation: 06:53





- Subjective


Subjective: 





no f/c, n/v/d


pending palcement





Objective





- Vital Signs/Intake and Output


Vital Signs (last 24 hours): 


 











Temp Pulse Resp BP Pulse Ox


 


 98.9 F   75   20   102/64   96 


 


 04/26/17 21:13  04/26/17 21:38  04/26/17 21:13  04/26/17 21:38  04/26/17 21:13











- Medications


Medications: 


 Current Medications





Aspirin (Ecotrin)  81 mg PO DAILY [facility]


   Last Admin: 04/26/17 10:05 Dose:  81 mg


Atorvastatin Calcium (Lipitor)  20 mg PO HS [facility]


   Last Admin: 04/26/17 21:38 Dose:  20 mg


Divalproex Sodium (Depakote Dr(*Bid*))  500 mg PO BID [facility]


   Last Admin: 04/26/17 16:47 Dose:  500 mg


Enalapril Maleate (Vasotec)  10 mg PO DAILY [facility]


   Last Admin: 04/26/17 10:05 Dose:  10 mg


Famotidine (Pepcid)  20 mg PO BID [facility]


   Last Admin: 04/26/17 16:47 Dose:  20 mg


Fenofibrate (Tricor)  145 mg PO DAILY [facility]


   Last Admin: 04/26/17 10:04 Dose:  145 mg


Metoprolol Tartrate (Lopressor)  50 mg PO Q12 [facility]


   Last Admin: 04/26/17 21:38 Dose:  50 mg


Quetiapine Fumarate (Seroquel)  25 mg PO HS [facility]


   Last Admin: 04/26/17 21:38 Dose:  25 mg











- Labs


Labs: 


 





 03/23/17 05:45 





 03/23/17 05:45 





 











PT  11.2 SECONDS (9.4-12.0)   12/14/15  05:20    


 


INR  1.05  (0.89-1.20)   12/14/15  05:20    


 


APTT  36.2 SECONDS (23.1-32.0)  H  12/14/15  05:20    














- Constitutional


Appears: Well, Non-toxic, No Acute Distress





- Head Exam


Head Exam: ATRAUMATIC, NORMAL INSPECTION, NORMOCEPHALIC





- Eye Exam


Eye Exam: EOMI, Normal appearance, PERRL


Pupil Exam: NORMAL ACCOMODATION, PERRL





- ENT Exam


ENT Exam: Mucous Membranes Moist, Normal Exam





- Neck Exam


Neck Exam: Full ROM, Normal Inspection.  absent: Lymphadenopathy





- Respiratory Exam


Respiratory Exam: Clear to Ausculation Bilateral, NORMAL BREATHING PATTERN





- Cardiovascular Exam


Cardiovascular Exam: REGULAR RHYTHM, RRR, +S1, +S2.  absent: Murmur





- GI/Abdominal Exam


GI & Abdominal Exam: Soft, Normal Bowel Sounds.  absent: Tenderness





- Extremities Exam


Extremities Exam: Full ROM, Normal Capillary Refill, Normal Inspection.  absent

: Joint Swelling, Pedal Edema





- Back Exam


Back Exam: NORMAL INSPECTION





- Neurological Exam


Neurological Exam: Alert, Awake, CN II-XII Intact, Normal Gait, Oriented x3





- Psychiatric Exam


Psychiatric exam: Normal Affect, Normal Mood





- Skin


Skin Exam: Dry, Intact, Normal Color, Warm





Assessment and Plan


(1) DVT prophylaxis


Status: Chronic   





(2) Scrotal edema


Status: Chronic   





(3) CAD (coronary artery disease)


Status: Chronic   





(4) Smoking


Status: Chronic   





(5) Low back pain


Status: Chronic   





(6) Prediabetes


Status: Chronic   





(7) Neurocognitive disorder


Status: Chronic   





- Assessment and Plan (Free Text)


Assessment: 





con meds, interventions, safety, placement

## 2017-04-28 RX ADMIN — DIVALPROEX SODIUM SCH MG: 250 TABLET, DELAYED RELEASE ORAL at 16:14

## 2017-04-28 RX ADMIN — DIVALPROEX SODIUM SCH MG: 250 TABLET, DELAYED RELEASE ORAL at 10:28

## 2017-04-28 NOTE — CP.PCM.PN
Subjective





- Date & Time of Evaluation


Date of Evaluation: 04/28/17


Time of Evaluation: 07:48





- Subjective


Subjective: 





no complaints/distress


no f/c, n/v/d


pending placmeent





Objective





- Vital Signs/Intake and Output


Vital Signs (last 24 hours): 


 











Temp Pulse Resp BP Pulse Ox


 


 98.4 F   71   128 H  128/66   99 


 


 04/27/17 22:00  04/27/17 22:34  04/27/17 22:00  04/27/17 22:34  04/27/17 22:00











- Medications


Medications: 


 Current Medications





Aspirin (Ecotrin)  81 mg PO DAILY [facility]


   Last Admin: 04/27/17 09:56 Dose:  81 mg


Atorvastatin Calcium (Lipitor)  20 mg PO HS [facility]


   Last Admin: 04/27/17 22:34 Dose:  20 mg


Divalproex Sodium (Depakote Dr(*Bid*))  500 mg PO BID [facility]


   Last Admin: 04/27/17 16:16 Dose:  500 mg


Enalapril Maleate (Vasotec)  10 mg PO DAILY [facility]


   Last Admin: 04/27/17 09:58 Dose:  10 mg


Famotidine (Pepcid)  20 mg PO BID [facility]


   Last Admin: 04/27/17 16:16 Dose:  20 mg


Fenofibrate (Tricor)  145 mg PO DAILY [facility]


   Last Admin: 04/27/17 09:58 Dose:  145 mg


Metoprolol Tartrate (Lopressor)  50 mg PO Q12 [facility]


   Last Admin: 04/27/17 22:34 Dose:  50 mg


Quetiapine Fumarate (Seroquel)  25 mg PO HS [facility]


   Last Admin: 04/27/17 22:35 Dose:  25 mg











- Labs


Labs: 


 





 03/23/17 05:45 





 03/23/17 05:45 





 











PT  11.2 SECONDS (9.4-12.0)   12/14/15  05:20    


 


INR  1.05  (0.89-1.20)   12/14/15  05:20    


 


APTT  36.2 SECONDS (23.1-32.0)  H  12/14/15  05:20    














- Constitutional


Appears: Well, Non-toxic, No Acute Distress





- Head Exam


Head Exam: ATRAUMATIC, NORMAL INSPECTION, NORMOCEPHALIC





- Eye Exam


Eye Exam: EOMI, Normal appearance, PERRL


Pupil Exam: NORMAL ACCOMODATION, PERRL





- ENT Exam


ENT Exam: Mucous Membranes Moist, Normal Exam





- Neck Exam


Neck Exam: Full ROM, Normal Inspection.  absent: Lymphadenopathy





- Respiratory Exam


Respiratory Exam: Clear to Ausculation Bilateral, NORMAL BREATHING PATTERN





- Cardiovascular Exam


Cardiovascular Exam: REGULAR RHYTHM, RRR, +S1, +S2.  absent: Murmur





- GI/Abdominal Exam


GI & Abdominal Exam: Soft, Normal Bowel Sounds.  absent: Tenderness





- Extremities Exam


Extremities Exam: Full ROM, Normal Capillary Refill, Normal Inspection.  absent

: Joint Swelling, Pedal Edema





- Back Exam


Back Exam: NORMAL INSPECTION





- Neurological Exam


Neurological Exam: Alert, Awake, CN II-XII Intact, Normal Gait, Oriented x3





- Psychiatric Exam


Psychiatric exam: Normal Affect, Normal Mood





- Skin


Skin Exam: Dry, Intact, Normal Color, Warm





Assessment and Plan


(1) DVT prophylaxis


Status: Chronic   





(2) Scrotal edema


Status: Chronic   





(3) CAD (coronary artery disease)


Status: Chronic   





(4) Smoking


Status: Chronic   





(5) Low back pain


Status: Chronic   





(6) Prediabetes


Status: Chronic   





(7) Neurocognitive disorder


Status: Chronic   





- Assessment and Plan (Free Text)


Assessment: 





pending placement, cont med,s safety

## 2017-04-29 RX ADMIN — DIVALPROEX SODIUM SCH MG: 250 TABLET, DELAYED RELEASE ORAL at 17:29

## 2017-04-29 RX ADMIN — DIVALPROEX SODIUM SCH MG: 250 TABLET, DELAYED RELEASE ORAL at 08:32

## 2017-04-29 NOTE — CP.PCM.PN
Subjective





- Date & Time of Evaluation


Date of Evaluation: 04/29/17


Time of Evaluation: 07:02





- Subjective


Subjective: 





no complaints/distress


no f/c, n/v/d


pending palcement





Objective





- Vital Signs/Intake and Output


Vital Signs (last 24 hours): 


 











Temp Pulse Resp BP Pulse Ox


 


 98.8 F   86   19   133/84   99 


 


 04/28/17 20:24  04/28/17 21:20  04/28/17 20:24  04/28/17 21:20  04/28/17 20:24











- Medications


Medications: 


 Current Medications





Aspirin (Ecotrin)  81 mg PO DAILY [facility]


   Last Admin: 04/28/17 10:29 Dose:  81 mg


Atorvastatin Calcium (Lipitor)  20 mg PO HS [facility]


   Last Admin: 04/28/17 21:20 Dose:  20 mg


Divalproex Sodium (Depakote Dr(*Bid*))  500 mg PO BID [facility]


   Last Admin: 04/28/17 16:14 Dose:  500 mg


Enalapril Maleate (Vasotec)  10 mg PO DAILY [facility]


   Last Admin: 04/28/17 10:30 Dose:  Not Given


Famotidine (Pepcid)  20 mg PO BID [facility]


   Last Admin: 04/28/17 16:15 Dose:  20 mg


Fenofibrate (Tricor)  145 mg PO DAILY [facility]


   Last Admin: 04/28/17 10:28 Dose:  145 mg


Metoprolol Tartrate (Lopressor)  50 mg PO Q12 [facility]


   Last Admin: 04/28/17 21:20 Dose:  50 mg


Quetiapine Fumarate (Seroquel)  25 mg PO HS [facility]


   Last Admin: 04/28/17 21:21 Dose:  25 mg











- Labs


Labs: 


 





 03/23/17 05:45 





 03/23/17 05:45 





 











PT  11.2 SECONDS (9.4-12.0)   12/14/15  05:20    


 


INR  1.05  (0.89-1.20)   12/14/15  05:20    


 


APTT  36.2 SECONDS (23.1-32.0)  H  12/14/15  05:20    














- Constitutional


Appears: Well, Non-toxic, No Acute Distress





- Head Exam


Head Exam: ATRAUMATIC, NORMAL INSPECTION, NORMOCEPHALIC





- Eye Exam


Eye Exam: EOMI, Normal appearance, PERRL


Pupil Exam: NORMAL ACCOMODATION, PERRL





- ENT Exam


ENT Exam: Mucous Membranes Moist, Normal Exam





- Neck Exam


Neck Exam: Full ROM, Normal Inspection.  absent: Lymphadenopathy





- Respiratory Exam


Respiratory Exam: Clear to Ausculation Bilateral, NORMAL BREATHING PATTERN





- Cardiovascular Exam


Cardiovascular Exam: REGULAR RHYTHM, RRR, +S1, +S2.  absent: Murmur





- GI/Abdominal Exam


GI & Abdominal Exam: Soft, Normal Bowel Sounds.  absent: Tenderness





- Rectal Exam


Rectal Exam: NORMAL INSPECTION





- Extremities Exam


Extremities Exam: Full ROM, Normal Capillary Refill, Normal Inspection.  absent

: Joint Swelling, Pedal Edema





- Back Exam


Back Exam: NORMAL INSPECTION





- Neurological Exam


Neurological Exam: Alert, Awake, CN II-XII Intact, Normal Gait, Oriented x3





- Psychiatric Exam


Psychiatric exam: Normal Affect, Normal Mood





- Skin


Skin Exam: Dry, Intact, Normal Color, Warm





Assessment and Plan


(1) DVT prophylaxis


Status: Chronic   





(2) Scrotal edema


Status: Chronic   





(3) CAD (coronary artery disease)


Status: Chronic   





(4) Smoking


Status: Chronic   





(5) Low back pain


Status: Chronic   





(6) Prediabetes


Status: Chronic   





(7) Neurocognitive disorder


Status: Chronic   





- Assessment and Plan (Free Text)


Assessment: 





cont safety, meds, palcement

## 2017-04-30 RX ADMIN — DIVALPROEX SODIUM SCH MG: 250 TABLET, DELAYED RELEASE ORAL at 09:47

## 2017-04-30 RX ADMIN — DIVALPROEX SODIUM SCH MG: 250 TABLET, DELAYED RELEASE ORAL at 16:42

## 2017-04-30 NOTE — CP.PCM.PN
Subjective





- Date & Time of Evaluation


Date of Evaluation: 04/30/17


Time of Evaluation: 09:17





- Subjective


Subjective: 





pt comfortable w/o distress


no f/c, n/v/d


pendin gpalcement





Objective





- Vital Signs/Intake and Output


Vital Signs (last 24 hours): 


 











Temp Pulse Resp BP Pulse Ox


 


 97.6 F   74   20   105/70   96 


 


 04/30/17 07:30  04/30/17 07:30  04/30/17 07:30  04/30/17 07:30  04/30/17 07:30











- Medications


Medications: 


 Current Medications





Aspirin (Ecotrin)  81 mg PO DAILY [facility]


   Last Admin: 04/29/17 08:32 Dose:  81 mg


Atorvastatin Calcium (Lipitor)  20 mg PO HS [facility]


   Last Admin: 04/29/17 21:26 Dose:  20 mg


Divalproex Sodium (Depakote Dr(*Bid*))  500 mg PO BID [facility]


   Last Admin: 04/29/17 17:29 Dose:  500 mg


Enalapril Maleate (Vasotec)  10 mg PO DAILY [facility]


   Last Admin: 04/29/17 08:32 Dose:  10 mg


Famotidine (Pepcid)  20 mg PO BID [facility]


   Last Admin: 04/29/17 17:29 Dose:  20 mg


Fenofibrate (Tricor)  145 mg PO DAILY [facility]


   Last Admin: 04/29/17 08:31 Dose:  145 mg


Metoprolol Tartrate (Lopressor)  50 mg PO Q12 [facility]


   Last Admin: 04/29/17 21:25 Dose:  50 mg


Quetiapine Fumarate (Seroquel)  25 mg PO HS [facility]


   Last Admin: 04/29/17 21:25 Dose:  25 mg











- Labs


Labs: 


 





 03/23/17 05:45 





 03/23/17 05:45 





 











PT  11.2 SECONDS (9.4-12.0)   12/14/15  05:20    


 


INR  1.05  (0.89-1.20)   12/14/15  05:20    


 


APTT  36.2 SECONDS (23.1-32.0)  H  12/14/15  05:20    














- Constitutional


Appears: Well, Non-toxic, No Acute Distress





- Head Exam


Head Exam: ATRAUMATIC, NORMAL INSPECTION, NORMOCEPHALIC





- Eye Exam


Eye Exam: EOMI, Normal appearance, PERRL


Pupil Exam: NORMAL ACCOMODATION, PERRL





- ENT Exam


ENT Exam: Mucous Membranes Moist, Normal Exam





- Neck Exam


Neck Exam: Full ROM, Normal Inspection.  absent: Lymphadenopathy





- Respiratory Exam


Respiratory Exam: Clear to Ausculation Bilateral, NORMAL BREATHING PATTERN





- Cardiovascular Exam


Cardiovascular Exam: REGULAR RHYTHM, RRR, +S1, +S2.  absent: Murmur





- GI/Abdominal Exam


GI & Abdominal Exam: Soft, Normal Bowel Sounds.  absent: Tenderness





- Extremities Exam


Extremities Exam: Full ROM, Normal Capillary Refill, Normal Inspection.  absent

: Joint Swelling, Pedal Edema





- Back Exam


Back Exam: NORMAL INSPECTION





- Neurological Exam


Neurological Exam: Alert, Awake, CN II-XII Intact, Normal Gait, Oriented x3





- Psychiatric Exam


Psychiatric exam: Normal Affect, Normal Mood





- Skin


Skin Exam: Dry, Intact, Normal Color, Warm





Assessment and Plan


(1) DVT prophylaxis


Status: Chronic   





(2) Scrotal edema


Status: Chronic   





(3) CAD (coronary artery disease)


Status: Chronic   





(4) Smoking


Status: Chronic   





(5) Low back pain


Status: Chronic   





(6) Prediabetes


Status: Chronic   





(7) Neurocognitive disorder


Status: Chronic   





- Assessment and Plan (Free Text)


Assessment: 





copnt meds, safety, placement

## 2017-05-01 RX ADMIN — DIVALPROEX SODIUM SCH MG: 250 TABLET, DELAYED RELEASE ORAL at 18:02

## 2017-05-01 RX ADMIN — DIVALPROEX SODIUM SCH MG: 250 TABLET, DELAYED RELEASE ORAL at 09:55

## 2017-05-01 NOTE — CP.PCM.PN
Subjective





- Date & Time of Evaluation


Date of Evaluation: 05/01/17


Time of Evaluation: 07:17





- Subjective


Subjective: 





no complaints/distress


no f/c, n/v/d


penidng placement





Objective





- Vital Signs/Intake and Output


Vital Signs (last 24 hours): 


 











Temp Pulse Resp BP Pulse Ox


 


 98.8 F   75   20   119/74   96 


 


 04/30/17 17:25  04/30/17 21:44  04/30/17 17:25  04/30/17 21:44  04/30/17 17:25











- Medications


Medications: 


 Current Medications





Aspirin (Ecotrin)  81 mg PO DAILY [facility]


   Last Admin: 04/30/17 09:47 Dose:  81 mg


Atorvastatin Calcium (Lipitor)  20 mg PO HS [facility]


   Last Admin: 04/30/17 21:44 Dose:  20 mg


Divalproex Sodium (Depakote Dr(*Bid*))  500 mg PO BID [facility]


   Last Admin: 04/30/17 16:42 Dose:  500 mg


Enalapril Maleate (Vasotec)  10 mg PO DAILY [facility]


   Last Admin: 04/30/17 09:50 Dose:  Not Given


Famotidine (Pepcid)  20 mg PO BID [facility]


   Last Admin: 04/30/17 16:42 Dose:  20 mg


Fenofibrate (Tricor)  145 mg PO DAILY [facility]


   Last Admin: 04/30/17 09:49 Dose:  145 mg


Metoprolol Tartrate (Lopressor)  50 mg PO Q12 [facility]


   Last Admin: 04/30/17 21:44 Dose:  50 mg


Quetiapine Fumarate (Seroquel)  25 mg PO HS [facility]


   Last Admin: 04/30/17 21:45 Dose:  25 mg











- Labs


Labs: 


 





 03/23/17 05:45 





 03/23/17 05:45 





 











PT  11.2 SECONDS (9.4-12.0)   12/14/15  05:20    


 


INR  1.05  (0.89-1.20)   12/14/15  05:20    


 


APTT  36.2 SECONDS (23.1-32.0)  H  12/14/15  05:20    














- Constitutional


Appears: Well, Non-toxic, No Acute Distress





- Head Exam


Head Exam: ATRAUMATIC, NORMAL INSPECTION, NORMOCEPHALIC





- Eye Exam


Eye Exam: EOMI, Normal appearance, PERRL


Pupil Exam: NORMAL ACCOMODATION, PERRL





- ENT Exam


ENT Exam: Mucous Membranes Moist, Normal Exam





- Neck Exam


Neck Exam: Full ROM, Normal Inspection.  absent: Lymphadenopathy





- Respiratory Exam


Respiratory Exam: Clear to Ausculation Bilateral, NORMAL BREATHING PATTERN





- Cardiovascular Exam


Cardiovascular Exam: REGULAR RHYTHM, RRR, +S1, +S2.  absent: Murmur





- GI/Abdominal Exam


GI & Abdominal Exam: Soft, Normal Bowel Sounds.  absent: Tenderness





- Extremities Exam


Extremities Exam: Full ROM, Normal Capillary Refill, Normal Inspection.  absent

: Joint Swelling, Pedal Edema





- Back Exam


Back Exam: NORMAL INSPECTION





- Neurological Exam


Neurological Exam: Alert, Awake, CN II-XII Intact, Normal Gait, Oriented x3





- Psychiatric Exam


Psychiatric exam: Normal Affect, Normal Mood





- Skin


Skin Exam: Dry, Intact, Normal Color, Warm





Assessment and Plan


(1) DVT prophylaxis


Status: Chronic   





(2) Scrotal edema


Status: Chronic   





(3) CAD (coronary artery disease)


Status: Chronic   





(4) Smoking


Status: Chronic   





(5) Low back pain


Status: Chronic   





(6) Prediabetes


Status: Chronic   





(7) Neurocognitive disorder


Status: Chronic   





- Assessment and Plan (Free Text)


Assessment: 





cont meds, placement, safety

## 2017-05-02 RX ADMIN — DIVALPROEX SODIUM SCH MG: 250 TABLET, DELAYED RELEASE ORAL at 16:24

## 2017-05-02 RX ADMIN — DIVALPROEX SODIUM SCH MG: 250 TABLET, DELAYED RELEASE ORAL at 08:33

## 2017-05-02 NOTE — CP.PCM.PN
Subjective





- Date & Time of Evaluation


Date of Evaluation: 05/02/17


Time of Evaluation: 07:22





- Subjective


Subjective: 





no complaints/distress


no f/c, n/v/d. penidng placement





Objective





- Vital Signs/Intake and Output


Vital Signs (last 24 hours): 


 











Temp Pulse Resp BP Pulse Ox


 


 98.2 F   74   18   112/72   96 


 


 05/01/17 22:00  05/01/17 22:00  05/01/17 22:00  05/01/17 22:00  05/01/17 22:00











- Medications


Medications: 


 Current Medications





Aspirin (Ecotrin)  81 mg PO DAILY [facility]


   Last Admin: 05/01/17 09:55 Dose:  81 mg


Atorvastatin Calcium (Lipitor)  20 mg PO HS [facility]


   Last Admin: 05/01/17 21:01 Dose:  20 mg


Divalproex Sodium (Depakote Dr(*Bid*))  500 mg PO BID [facility]


   Last Admin: 05/01/17 18:02 Dose:  500 mg


Enalapril Maleate (Vasotec)  10 mg PO DAILY [facility]


   Last Admin: 05/01/17 09:56 Dose:  Not Given


Famotidine (Pepcid)  20 mg PO BID [facility]


   Last Admin: 05/01/17 18:03 Dose:  20 mg


Fenofibrate (Tricor)  145 mg PO DAILY [facility]


   Last Admin: 05/01/17 09:56 Dose:  145 mg


Metoprolol Tartrate (Lopressor)  50 mg PO Q12 [facility]


   Last Admin: 05/01/17 21:01 Dose:  50 mg


Quetiapine Fumarate (Seroquel)  25 mg PO HS [facility]


   Last Admin: 05/01/17 21:01 Dose:  25 mg











- Labs


Labs: 


 





 03/23/17 05:45 





 03/23/17 05:45 





 











PT  11.2 SECONDS (9.4-12.0)   12/14/15  05:20    


 


INR  1.05  (0.89-1.20)   12/14/15  05:20    


 


APTT  36.2 SECONDS (23.1-32.0)  H  12/14/15  05:20    














- Constitutional


Appears: Well, Non-toxic, No Acute Distress





- Head Exam


Head Exam: ATRAUMATIC, NORMAL INSPECTION, NORMOCEPHALIC





- Eye Exam


Eye Exam: EOMI, Normal appearance, PERRL


Pupil Exam: NORMAL ACCOMODATION, PERRL





- ENT Exam


ENT Exam: Mucous Membranes Moist, Normal Exam





- Neck Exam


Neck Exam: Full ROM, Normal Inspection.  absent: Lymphadenopathy





- Respiratory Exam


Respiratory Exam: Clear to Ausculation Bilateral, NORMAL BREATHING PATTERN





- Cardiovascular Exam


Cardiovascular Exam: REGULAR RHYTHM, +S1, +S2.  absent: Murmur





- GI/Abdominal Exam


GI & Abdominal Exam: Soft, Normal Bowel Sounds.  absent: Tenderness





- Extremities Exam


Extremities Exam: Full ROM, Normal Capillary Refill, Normal Inspection.  absent

: Joint Swelling, Pedal Edema





- Back Exam


Back Exam: NORMAL INSPECTION





- Neurological Exam


Neurological Exam: Alert, Awake, CN II-XII Intact, Normal Gait, Oriented x3





- Psychiatric Exam


Psychiatric exam: Normal Affect, Normal Mood





- Skin


Skin Exam: Dry, Intact, Normal Color, Warm





Assessment and Plan


(1) DVT prophylaxis


Status: Chronic   





(2) Scrotal edema


Status: Chronic   





(3) CAD (coronary artery disease)


Status: Chronic   





(4) Smoking


Status: Chronic   





(5) Low back pain


Status: Chronic   





(6) Prediabetes


Status: Chronic   





(7) Neurocognitive disorder


Status: Chronic   





- Assessment and Plan (Free Text)


Assessment: 





cont meds, placement, safety

## 2017-05-03 RX ADMIN — DIVALPROEX SODIUM SCH MG: 250 TABLET, DELAYED RELEASE ORAL at 09:17

## 2017-05-03 RX ADMIN — DIVALPROEX SODIUM SCH MG: 250 TABLET, DELAYED RELEASE ORAL at 16:28

## 2017-05-03 NOTE — CP.PCM.PN
Subjective





- Date & Time of Evaluation


Date of Evaluation: 05/03/17


Time of Evaluation: 07:51





- Subjective


Subjective: 





no complaints/distress


no f/c, n/v/d


pending palcement





Objective





- Vital Signs/Intake and Output


Vital Signs (last 24 hours): 


 











Temp Pulse Resp BP Pulse Ox


 


 97.5 F L  75   20   103/71   97 


 


 05/03/17 07:44  05/03/17 07:44  05/03/17 07:44  05/03/17 07:44  05/03/17 07:44











- Medications


Medications: 


 Current Medications





Aspirin (Ecotrin)  81 mg PO DAILY [facility]


   Last Admin: 05/02/17 08:34 Dose:  81 mg


Atorvastatin Calcium (Lipitor)  20 mg PO HS [facility]


   Last Admin: 05/02/17 21:14 Dose:  20 mg


Divalproex Sodium (Depakote Dr(*Bid*))  500 mg PO BID [facility]


   Last Admin: 05/02/17 16:24 Dose:  500 mg


Enalapril Maleate (Vasotec)  10 mg PO DAILY [facility]


   Last Admin: 05/02/17 08:34 Dose:  10 mg


Famotidine (Pepcid)  20 mg PO BID [facility]


   Last Admin: 05/02/17 16:24 Dose:  20 mg


Fenofibrate (Tricor)  145 mg PO DAILY [facility]


   Last Admin: 05/02/17 08:34 Dose:  145 mg


Metoprolol Tartrate (Lopressor)  50 mg PO Q12 [facility]


   Last Admin: 05/02/17 21:15 Dose:  50 mg


Quetiapine Fumarate (Seroquel)  25 mg PO HS [facility]


   Last Admin: 05/02/17 21:14 Dose:  25 mg











- Labs


Labs: 


 





 03/23/17 05:45 





 03/23/17 05:45 





 











PT  11.2 SECONDS (9.4-12.0)   12/14/15  05:20    


 


INR  1.05  (0.89-1.20)   12/14/15  05:20    


 


APTT  36.2 SECONDS (23.1-32.0)  H  12/14/15  05:20    














- Constitutional


Appears: Well, Non-toxic, No Acute Distress





- Head Exam


Head Exam: ATRAUMATIC, NORMAL INSPECTION, NORMOCEPHALIC





- Eye Exam


Eye Exam: EOMI, Normal appearance, PERRL


Pupil Exam: NORMAL ACCOMODATION, PERRL





- ENT Exam


ENT Exam: Mucous Membranes Moist, Normal Exam





- Neck Exam


Neck Exam: Full ROM, Normal Inspection.  absent: Lymphadenopathy





- Respiratory Exam


Respiratory Exam: Clear to Ausculation Bilateral, NORMAL BREATHING PATTERN





- Cardiovascular Exam


Cardiovascular Exam: REGULAR RHYTHM, +S1, +S2.  absent: Murmur





- GI/Abdominal Exam


GI & Abdominal Exam: Soft, Normal Bowel Sounds.  absent: Tenderness





- Extremities Exam


Extremities Exam: Full ROM, Normal Capillary Refill, Normal Inspection.  absent

: Joint Swelling, Pedal Edema





- Back Exam


Back Exam: NORMAL INSPECTION





- Neurological Exam


Neurological Exam: Alert, Awake, CN II-XII Intact, Normal Gait, Oriented x3





- Psychiatric Exam


Psychiatric exam: Normal Affect, Normal Mood





- Skin


Skin Exam: Dry, Intact, Normal Color, Warm





Assessment and Plan


(1) DVT prophylaxis


Status: Chronic   





(2) Scrotal edema


Status: Chronic   





(3) CAD (coronary artery disease)


Status: Chronic   





(4) Smoking


Status: Chronic   





(5) Low back pain


Status: Chronic   





(6) Prediabetes


Status: Chronic   





(7) Neurocognitive disorder


Status: Chronic   





- Assessment and Plan (Free Text)


Assessment: 





cont meds, placement, safety

## 2017-05-04 RX ADMIN — DIVALPROEX SODIUM SCH MG: 250 TABLET, DELAYED RELEASE ORAL at 17:42

## 2017-05-04 RX ADMIN — DIVALPROEX SODIUM SCH MG: 250 TABLET, DELAYED RELEASE ORAL at 08:43

## 2017-05-04 NOTE — CP.PCM.PN
Subjective





- Date & Time of Evaluation


Date of Evaluation: 05/04/17


Time of Evaluation: 07:25





- Subjective


Subjective: 





no complaints/distress


no f/c, nv//d


penidng placement





Objective





- Vital Signs/Intake and Output


Vital Signs (last 24 hours): 


 











Temp Pulse Resp BP Pulse Ox


 


 97.5 F L  88   20   108/85   99 


 


 05/04/17 00:41  05/04/17 00:41  05/04/17 00:41  05/04/17 00:41  05/04/17 00:41











- Medications


Medications: 


 Current Medications





Aspirin (Ecotrin)  81 mg PO DAILY [facility]


   Last Admin: 05/03/17 09:17 Dose:  81 mg


Atorvastatin Calcium (Lipitor)  20 mg PO HS [facility]


   Last Admin: 05/03/17 21:12 Dose:  20 mg


Divalproex Sodium (Depakote Dr(*Bid*))  500 mg PO BID [facility]


   Last Admin: 05/03/17 16:28 Dose:  500 mg


Enalapril Maleate (Vasotec)  10 mg PO DAILY [facility]


   Last Admin: 05/03/17 09:17 Dose:  10 mg


Famotidine (Pepcid)  20 mg PO BID [facility]


   Last Admin: 05/03/17 16:28 Dose:  20 mg


Fenofibrate (Tricor)  145 mg PO DAILY [facility]


   Last Admin: 05/03/17 09:17 Dose:  145 mg


Metoprolol Tartrate (Lopressor)  50 mg PO Q12 [facility]


   Last Admin: 05/03/17 21:13 Dose:  50 mg


Quetiapine Fumarate (Seroquel)  25 mg PO HS [facility]


   Last Admin: 05/03/17 21:13 Dose:  25 mg











- Labs


Labs: 


 





 03/23/17 05:45 





 03/23/17 05:45 





 











PT  11.2 SECONDS (9.4-12.0)   12/14/15  05:20    


 


INR  1.05  (0.89-1.20)   12/14/15  05:20    


 


APTT  36.2 SECONDS (23.1-32.0)  H  12/14/15  05:20    














- Constitutional


Appears: Well, Non-toxic, No Acute Distress





- Head Exam


Head Exam: ATRAUMATIC, NORMAL INSPECTION, NORMOCEPHALIC





- Eye Exam


Eye Exam: EOMI, Normal appearance, PERRL


Pupil Exam: NORMAL ACCOMODATION, PERRL





- ENT Exam


ENT Exam: Mucous Membranes Moist, Normal Exam





- Neck Exam


Neck Exam: Full ROM, Normal Inspection.  absent: Lymphadenopathy





- Respiratory Exam


Respiratory Exam: Clear to Ausculation Bilateral, NORMAL BREATHING PATTERN





- Cardiovascular Exam


Cardiovascular Exam: REGULAR RHYTHM, +S1, +S2.  absent: Murmur





- GI/Abdominal Exam


GI & Abdominal Exam: Soft, Normal Bowel Sounds.  absent: Tenderness





- Extremities Exam


Extremities Exam: Full ROM, Normal Capillary Refill, Normal Inspection.  absent

: Joint Swelling, Pedal Edema





- Back Exam


Back Exam: NORMAL INSPECTION





- Neurological Exam


Neurological Exam: Alert, Awake, CN II-XII Intact, Normal Gait, Oriented x3





- Psychiatric Exam


Psychiatric exam: Normal Affect, Normal Mood





- Skin


Skin Exam: Dry, Intact, Normal Color, Warm





Assessment and Plan


(1) DVT prophylaxis


Status: Chronic   





(2) Scrotal edema


Status: Chronic   





(3) CAD (coronary artery disease)


Status: Chronic   





(4) Smoking


Status: Chronic   





(5) Low back pain


Status: Chronic   





(6) Prediabetes


Status: Chronic   





(7) Neurocognitive disorder


Status: Chronic   





- Assessment and Plan (Free Text)


Assessment: 





cont safety, meds placement

## 2017-05-05 RX ADMIN — DIVALPROEX SODIUM SCH MG: 250 TABLET, DELAYED RELEASE ORAL at 08:53

## 2017-05-05 RX ADMIN — DIVALPROEX SODIUM SCH MG: 250 TABLET, DELAYED RELEASE ORAL at 16:37

## 2017-05-05 NOTE — CP.PCM.PN
Subjective





- Date & Time of Evaluation


Date of Evaluation: 05/05/17


Time of Evaluation: 07:25





- Subjective


Subjective: 





no complaints/distress


no f/c, n/v/d


pending placement





Objective





- Vital Signs/Intake and Output


Vital Signs (last 24 hours): 


 











Temp Pulse Resp BP Pulse Ox


 


 97.5 F L  62   19   112/73   97 


 


 05/05/17 00:00  05/05/17 00:00  05/05/17 00:00  05/05/17 00:00  05/05/17 00:00











- Medications


Medications: 


 Current Medications





Aspirin (Ecotrin)  81 mg PO DAILY [facility]


   Last Admin: 05/04/17 08:43 Dose:  81 mg


Atorvastatin Calcium (Lipitor)  20 mg PO HS [facility]


   Last Admin: 05/04/17 21:40 Dose:  20 mg


Divalproex Sodium (Depakote Dr(*Bid*))  500 mg PO BID [facility]


   Last Admin: 05/04/17 17:42 Dose:  500 mg


Enalapril Maleate (Vasotec)  10 mg PO DAILY [facility]


   Last Admin: 05/04/17 08:42 Dose:  10 mg


Famotidine (Pepcid)  20 mg PO BID [facility]


   Last Admin: 05/04/17 17:42 Dose:  20 mg


Fenofibrate (Tricor)  145 mg PO DAILY [facility]


   Last Admin: 05/04/17 08:44 Dose:  145 mg


Metoprolol Tartrate (Lopressor)  50 mg PO Q12 [facility]


   Last Admin: 05/04/17 21:41 Dose:  50 mg


Quetiapine Fumarate (Seroquel)  25 mg PO HS [facility]


   Last Admin: 05/04/17 21:40 Dose:  25 mg











- Labs


Labs: 


 





 03/23/17 05:45 





 03/23/17 05:45 





 











PT  11.2 SECONDS (9.4-12.0)   12/14/15  05:20    


 


INR  1.05  (0.89-1.20)   12/14/15  05:20    


 


APTT  36.2 SECONDS (23.1-32.0)  H  12/14/15  05:20    














- Constitutional


Appears: Well, Non-toxic, No Acute Distress





- Head Exam


Head Exam: ATRAUMATIC, NORMAL INSPECTION, NORMOCEPHALIC





- Eye Exam


Eye Exam: EOMI, Normal appearance, PERRL


Pupil Exam: NORMAL ACCOMODATION, PERRL





- ENT Exam


ENT Exam: Mucous Membranes Moist, Normal Exam





- Neck Exam


Neck Exam: Full ROM, Normal Inspection.  absent: Lymphadenopathy





- Respiratory Exam


Respiratory Exam: Clear to Ausculation Bilateral, NORMAL BREATHING PATTERN





- Cardiovascular Exam


Cardiovascular Exam: REGULAR RHYTHM, +S1, +S2.  absent: Murmur





- GI/Abdominal Exam


GI & Abdominal Exam: Soft, Normal Bowel Sounds.  absent: Tenderness





- Extremities Exam


Extremities Exam: Full ROM, Normal Capillary Refill, Normal Inspection.  absent

: Joint Swelling, Pedal Edema





- Back Exam


Back Exam: NORMAL INSPECTION





- Neurological Exam


Neurological Exam: Alert, Awake, CN II-XII Intact, Normal Gait, Oriented x3





- Psychiatric Exam


Psychiatric exam: Normal Affect, Normal Mood





- Skin


Skin Exam: Dry, Intact, Normal Color, Warm





Assessment and Plan


(1) DVT prophylaxis


Status: Chronic   





(2) Scrotal edema


Status: Chronic   





(3) CAD (coronary artery disease)


Status: Chronic   





(4) Smoking


Status: Chronic   





(5) Low back pain


Status: Chronic   





(6) Prediabetes


Status: Chronic   





(7) Neurocognitive disorder


Status: Chronic   





- Assessment and Plan (Free Text)


Assessment: 





cont placement, meds, safety

## 2017-05-06 RX ADMIN — DIVALPROEX SODIUM SCH MG: 250 TABLET, DELAYED RELEASE ORAL at 10:20

## 2017-05-06 RX ADMIN — DIVALPROEX SODIUM SCH MG: 250 TABLET, DELAYED RELEASE ORAL at 16:08

## 2017-05-06 NOTE — CP.PCM.PN
Subjective





- Date & Time of Evaluation


Date of Evaluation: 05/06/17


Time of Evaluation: 09:32





- Subjective


Subjective: 





no complaints/distress


no f/c, n./v/d


pending placement





Objective





- Vital Signs/Intake and Output


Vital Signs (last 24 hours): 


 











Temp Pulse Resp BP Pulse Ox


 


 97.3 F L  67   20   110/70   98 


 


 05/06/17 08:47  05/06/17 08:47  05/06/17 08:47  05/06/17 08:47  05/06/17 08:47











- Medications


Medications: 


 Current Medications





Aspirin (Ecotrin)  81 mg PO DAILY [facility]


   Last Admin: 05/05/17 08:48 Dose:  81 mg


Atorvastatin Calcium (Lipitor)  20 mg PO HS [facility]


   Last Admin: 05/05/17 21:38 Dose:  20 mg


Divalproex Sodium (Depakote Dr(*Bid*))  500 mg PO BID [facility]


   Last Admin: 05/05/17 16:37 Dose:  500 mg


Enalapril Maleate (Vasotec)  10 mg PO DAILY [facility]


   Last Admin: 05/05/17 08:48 Dose:  10 mg


Famotidine (Pepcid)  20 mg PO BID [facility]


   Last Admin: 05/05/17 16:38 Dose:  20 mg


Fenofibrate (Tricor)  145 mg PO DAILY [facility]


   Last Admin: 05/05/17 08:48 Dose:  145 mg


Metoprolol Tartrate (Lopressor)  50 mg PO Q12 [facility]


   Last Admin: 05/05/17 21:37 Dose:  50 mg


Quetiapine Fumarate (Seroquel)  25 mg PO HS [facility]


   Last Admin: 05/05/17 21:38 Dose:  25 mg











- Labs


Labs: 


 





 03/23/17 05:45 





 03/23/17 05:45 





 











PT  11.2 SECONDS (9.4-12.0)   12/14/15  05:20    


 


INR  1.05  (0.89-1.20)   12/14/15  05:20    


 


APTT  36.2 SECONDS (23.1-32.0)  H  12/14/15  05:20    














- Constitutional


Appears: Well, Non-toxic, No Acute Distress





- Head Exam


Head Exam: ATRAUMATIC, NORMAL INSPECTION, NORMOCEPHALIC





- Eye Exam


Eye Exam: EOMI, Normal appearance, PERRL


Pupil Exam: NORMAL ACCOMODATION, PERRL





- ENT Exam


ENT Exam: Mucous Membranes Moist, Normal Exam





- Neck Exam


Neck Exam: Full ROM, Normal Inspection.  absent: Lymphadenopathy





- Respiratory Exam


Respiratory Exam: Clear to Ausculation Bilateral, NORMAL BREATHING PATTERN





- Cardiovascular Exam


Cardiovascular Exam: REGULAR RHYTHM, RRR, +S1, +S2.  absent: Murmur





- GI/Abdominal Exam


GI & Abdominal Exam: Soft, Normal Bowel Sounds.  absent: Tenderness





- Extremities Exam


Extremities Exam: Full ROM, Normal Capillary Refill, Normal Inspection.  absent

: Joint Swelling, Pedal Edema





- Back Exam


Back Exam: NORMAL INSPECTION





- Neurological Exam


Neurological Exam: Alert, Awake, CN II-XII Intact, Normal Gait, Oriented x3





- Psychiatric Exam


Psychiatric exam: Normal Affect, Normal Mood





- Skin


Skin Exam: Dry, Intact, Normal Color, Warm





Assessment and Plan


(1) DVT prophylaxis


Status: Chronic   





(2) Scrotal edema


Status: Chronic   





(3) CAD (coronary artery disease)


Status: Chronic   





(4) Smoking


Status: Chronic   





(5) Low back pain


Status: Chronic   





(6) Prediabetes


Status: Chronic   





(7) Neurocognitive disorder


Status: Chronic   





- Assessment and Plan (Free Text)


Assessment: 





cont meds, placement, safety

## 2017-05-07 RX ADMIN — DIVALPROEX SODIUM SCH MG: 250 TABLET, DELAYED RELEASE ORAL at 09:08

## 2017-05-07 RX ADMIN — DIVALPROEX SODIUM SCH MG: 250 TABLET, DELAYED RELEASE ORAL at 16:06

## 2017-05-07 NOTE — CP.PCM.PN
Subjective





- Date & Time of Evaluation


Date of Evaluation: 05/07/17


Time of Evaluation: 09:28





- Subjective


Subjective: 





no complaints/distress


no f/c, n/v/d


pending palcement





Objective





- Vital Signs/Intake and Output


Vital Signs (last 24 hours): 


 











Temp Pulse Resp BP Pulse Ox


 


 97.9 F   63   20   114/78   98 


 


 05/07/17 08:20  05/07/17 08:20  05/07/17 08:20  05/07/17 09:07  05/07/17 08:20











- Medications


Medications: 


 Current Medications





Aspirin (Ecotrin)  81 mg PO DAILY [facility]


   Last Admin: 05/07/17 09:08 Dose:  81 mg


Atorvastatin Calcium (Lipitor)  20 mg PO HS [facility]


   Last Admin: 05/06/17 21:03 Dose:  20 mg


Divalproex Sodium (Depakote Dr(*Bid*))  500 mg PO BID [facility]


   Last Admin: 05/07/17 09:08 Dose:  500 mg


Enalapril Maleate (Vasotec)  10 mg PO DAILY [facility]


   Last Admin: 05/07/17 09:09 Dose:  10 mg


Famotidine (Pepcid)  20 mg PO BID [facility]


   Last Admin: 05/07/17 09:07 Dose:  20 mg


Fenofibrate (Tricor)  145 mg PO DAILY [facility]


   Last Admin: 05/07/17 09:08 Dose:  145 mg


Metoprolol Tartrate (Lopressor)  50 mg PO Q12 [facility]


   Last Admin: 05/07/17 09:07 Dose:  50 mg


Quetiapine Fumarate (Seroquel)  25 mg PO HS [facility]


   Last Admin: 05/06/17 21:03 Dose:  25 mg











- Labs


Labs: 


 





 03/23/17 05:45 





 03/23/17 05:45 





 











PT  11.2 SECONDS (9.4-12.0)   12/14/15  05:20    


 


INR  1.05  (0.89-1.20)   12/14/15  05:20    


 


APTT  36.2 SECONDS (23.1-32.0)  H  12/14/15  05:20    














- Constitutional


Appears: Well, Non-toxic, No Acute Distress





- Head Exam


Head Exam: ATRAUMATIC, NORMAL INSPECTION, NORMOCEPHALIC





- Eye Exam


Eye Exam: EOMI, Normal appearance, PERRL


Pupil Exam: NORMAL ACCOMODATION, PERRL





- ENT Exam


ENT Exam: Mucous Membranes Moist, Normal Exam





- Neck Exam


Neck Exam: Full ROM, Normal Inspection.  absent: Lymphadenopathy





- Respiratory Exam


Respiratory Exam: Clear to Ausculation Bilateral, NORMAL BREATHING PATTERN





- Cardiovascular Exam


Cardiovascular Exam: REGULAR RHYTHM, +S1, +S2.  absent: Murmur





- GI/Abdominal Exam


GI & Abdominal Exam: Soft, Normal Bowel Sounds.  absent: Tenderness





- Extremities Exam


Extremities Exam: Full ROM, Normal Capillary Refill, Normal Inspection.  absent

: Joint Swelling, Pedal Edema





- Back Exam


Back Exam: NORMAL INSPECTION





- Neurological Exam


Neurological Exam: Alert, Awake, CN II-XII Intact, Normal Gait, Oriented x3





- Psychiatric Exam


Psychiatric exam: Normal Affect, Normal Mood





- Skin


Skin Exam: Dry, Intact, Normal Color, Warm





Assessment and Plan


(1) DVT prophylaxis


Status: Chronic   





(2) Scrotal edema


Status: Chronic   





(3) CAD (coronary artery disease)


Status: Chronic   





(4) Smoking


Status: Chronic   





(5) Low back pain


Status: Chronic   





(6) Prediabetes


Status: Chronic   





(7) Neurocognitive disorder


Status: Chronic   





- Assessment and Plan (Free Text)


Assessment: 





cont all meds, interventions, therapies

## 2017-05-08 RX ADMIN — DIVALPROEX SODIUM SCH MG: 250 TABLET, DELAYED RELEASE ORAL at 09:08

## 2017-05-08 RX ADMIN — DIVALPROEX SODIUM SCH MG: 250 TABLET, DELAYED RELEASE ORAL at 16:55

## 2017-05-08 NOTE — CP.PCM.PN
Subjective





- Date & Time of Evaluation


Date of Evaluation: 05/08/17


Time of Evaluation: 07:26





- Subjective


Subjective: 





no complaints/distress


no f/c, n/v/d


pending placement





Objective





- Vital Signs/Intake and Output


Vital Signs (last 24 hours): 


 











Temp Pulse Resp BP Pulse Ox


 


 98.1 F   71   19   139/68   100 


 


 05/08/17 00:00  05/08/17 00:00  05/08/17 00:00  05/08/17 00:00  05/08/17 00:00











- Medications


Medications: 


 Current Medications





Aspirin (Ecotrin)  81 mg PO DAILY [facility]


   Last Admin: 05/07/17 09:08 Dose:  81 mg


Atorvastatin Calcium (Lipitor)  20 mg PO HS [facility]


   Last Admin: 05/07/17 21:00 Dose:  20 mg


Divalproex Sodium (Depakote Dr(*Bid*))  500 mg PO BID [facility]


   Last Admin: 05/07/17 16:06 Dose:  500 mg


Enalapril Maleate (Vasotec)  10 mg PO DAILY [facility]


   Last Admin: 05/07/17 09:09 Dose:  10 mg


Famotidine (Pepcid)  20 mg PO BID [facility]


   Last Admin: 05/07/17 16:06 Dose:  20 mg


Fenofibrate (Tricor)  145 mg PO DAILY [facility]


   Last Admin: 05/07/17 09:08 Dose:  145 mg


Metoprolol Tartrate (Lopressor)  50 mg PO Q12 [facility]


   Last Admin: 05/07/17 20:53 Dose:  50 mg


Quetiapine Fumarate (Seroquel)  25 mg PO HS [facility]


   Last Admin: 05/07/17 21:00 Dose:  25 mg











- Labs


Labs: 


 





 03/23/17 05:45 





 03/23/17 05:45 





 











PT  11.2 SECONDS (9.4-12.0)   12/14/15  05:20    


 


INR  1.05  (0.89-1.20)   12/14/15  05:20    


 


APTT  36.2 SECONDS (23.1-32.0)  H  12/14/15  05:20    














- Constitutional


Appears: Well, Non-toxic, No Acute Distress





- Head Exam


Head Exam: ATRAUMATIC, NORMAL INSPECTION, NORMOCEPHALIC





- Eye Exam


Eye Exam: EOMI, Normal appearance, PERRL


Pupil Exam: NORMAL ACCOMODATION, PERRL





- ENT Exam


ENT Exam: Mucous Membranes Moist, Normal Exam





- Neck Exam


Neck Exam: Full ROM, Normal Inspection.  absent: Lymphadenopathy





- Respiratory Exam


Respiratory Exam: Clear to Ausculation Bilateral, NORMAL BREATHING PATTERN





- Cardiovascular Exam


Cardiovascular Exam: REGULAR RHYTHM, RRR, +S1, +S2.  absent: Murmur





- GI/Abdominal Exam


GI & Abdominal Exam: Soft, Normal Bowel Sounds.  absent: Tenderness





- Extremities Exam


Extremities Exam: Full ROM, Normal Capillary Refill, Normal Inspection.  absent

: Joint Swelling, Pedal Edema





- Back Exam


Back Exam: NORMAL INSPECTION





- Neurological Exam


Neurological Exam: Alert, Awake, CN II-XII Intact, Normal Gait, Oriented x3





- Psychiatric Exam


Psychiatric exam: Normal Affect, Normal Mood





- Skin


Skin Exam: Dry, Intact, Normal Color, Warm





Assessment and Plan


(1) DVT prophylaxis


Status: Chronic   





(2) Scrotal edema


Status: Chronic   





(3) CAD (coronary artery disease)


Status: Chronic   





(4) Smoking


Status: Chronic   





(5) Low back pain


Status: Chronic   





(6) Prediabetes


Status: Chronic   





(7) Neurocognitive disorder


Status: Chronic   





- Assessment and Plan (Free Text)


Assessment: 





cont placement, safety, meds

## 2017-05-09 RX ADMIN — DIVALPROEX SODIUM SCH MG: 250 TABLET, DELAYED RELEASE ORAL at 16:09

## 2017-05-09 RX ADMIN — DIVALPROEX SODIUM SCH MG: 250 TABLET, DELAYED RELEASE ORAL at 08:57

## 2017-05-09 NOTE — CP.PCM.PN
Subjective





- Date & Time of Evaluation


Date of Evaluation: 05/09/17


Time of Evaluation: 10:49





- Subjective


Subjective: 





no f./c, n/v/d. no distress/complaints


pending palcement





Objective





- Vital Signs/Intake and Output


Vital Signs (last 24 hours): 


 











Temp Pulse Resp BP Pulse Ox


 


 97.7 F   64   18   108/71   98 


 


 05/09/17 08:14  05/09/17 08:56  05/09/17 08:14  05/09/17 08:56  05/09/17 08:14











- Medications


Medications: 


 Current Medications





Aspirin (Ecotrin)  81 mg PO DAILY [facility]


   Last Admin: 05/09/17 08:56 Dose:  81 mg


Atorvastatin Calcium (Lipitor)  20 mg PO HS [facility]


   Last Admin: 05/08/17 21:11 Dose:  20 mg


Divalproex Sodium (Depakote Dr(*Bid*))  500 mg PO BID [facility]


   Last Admin: 05/09/17 08:57 Dose:  500 mg


Enalapril Maleate (Vasotec)  10 mg PO DAILY [facility]


   Last Admin: 05/09/17 08:57 Dose:  10 mg


Famotidine (Pepcid)  20 mg PO BID [facility]


   Last Admin: 05/09/17 08:53 Dose:  20 mg


Fenofibrate (Tricor)  145 mg PO DAILY [facility]


   Last Admin: 05/09/17 08:57 Dose:  145 mg


Metoprolol Tartrate (Lopressor)  50 mg PO Q12 [facility]


   Last Admin: 05/09/17 08:56 Dose:  50 mg


Quetiapine Fumarate (Seroquel)  25 mg PO HS [facility]


   Last Admin: 05/08/17 21:11 Dose:  25 mg











- Labs


Labs: 


 





 03/23/17 05:45 





 03/23/17 05:45 





 











PT  11.2 SECONDS (9.4-12.0)   12/14/15  05:20    


 


INR  1.05  (0.89-1.20)   12/14/15  05:20    


 


APTT  36.2 SECONDS (23.1-32.0)  H  12/14/15  05:20    














- Constitutional


Appears: Well, Non-toxic, No Acute Distress





- Head Exam


Head Exam: ATRAUMATIC, NORMAL INSPECTION, NORMOCEPHALIC





- Eye Exam


Eye Exam: EOMI, Normal appearance, PERRL


Pupil Exam: NORMAL ACCOMODATION, PERRL





- ENT Exam


ENT Exam: Mucous Membranes Moist, Normal Exam





- Neck Exam


Neck Exam: Full ROM, Normal Inspection.  absent: Lymphadenopathy





- Respiratory Exam


Respiratory Exam: Clear to Ausculation Bilateral, NORMAL BREATHING PATTERN





- Cardiovascular Exam


Cardiovascular Exam: REGULAR RHYTHM, RRR, +S1, +S2.  absent: Murmur





- GI/Abdominal Exam


GI & Abdominal Exam: Soft, Normal Bowel Sounds.  absent: Tenderness





- Extremities Exam


Extremities Exam: Full ROM, Normal Capillary Refill, Normal Inspection.  absent

: Joint Swelling, Pedal Edema





- Back Exam


Back Exam: NORMAL INSPECTION





- Neurological Exam


Neurological Exam: Alert, Awake, CN II-XII Intact, Normal Gait, Oriented x3





- Psychiatric Exam


Psychiatric exam: Normal Affect, Normal Mood





- Skin


Skin Exam: Dry, Intact, Normal Color, Warm





Assessment and Plan


(1) DVT prophylaxis


Status: Chronic   





(2) Scrotal edema


Status: Chronic   





(3) CAD (coronary artery disease)


Status: Chronic   





(4) Smoking


Status: Chronic   





(5) Low back pain


Status: Chronic   





(6) Prediabetes


Status: Chronic   





(7) Neurocognitive disorder


Status: Chronic   





- Assessment and Plan (Free Text)


Assessment: 





cont meds, safety, placement

## 2017-05-10 RX ADMIN — DIVALPROEX SODIUM SCH MG: 250 TABLET, DELAYED RELEASE ORAL at 17:13

## 2017-05-10 RX ADMIN — DIVALPROEX SODIUM SCH MG: 250 TABLET, DELAYED RELEASE ORAL at 11:03

## 2017-05-10 NOTE — CP.PCM.PN
Subjective





- Date & Time of Evaluation


Date of Evaluation: 05/10/17


Time of Evaluation: 07:28





- Subjective


Subjective: 





no complaints/distress


no f/c, n/v/d. 


pending placemet





Objective





- Vital Signs/Intake and Output


Vital Signs (last 24 hours): 


 











Temp Pulse Resp BP Pulse Ox


 


 98.2 F   70   18   91/61 L  95 


 


 05/10/17 00:26  05/10/17 00:26  05/10/17 00:26  05/10/17 00:26  05/10/17 00:26











- Medications


Medications: 


 Current Medications





Aspirin (Ecotrin)  81 mg PO DAILY [facility]


   Last Admin: 05/09/17 08:56 Dose:  81 mg


Atorvastatin Calcium (Lipitor)  20 mg PO HS [facility]


   Last Admin: 05/09/17 21:05 Dose:  20 mg


Divalproex Sodium (Depakote Dr(*Bid*))  500 mg PO BID [facility]


   Last Admin: 05/09/17 16:09 Dose:  500 mg


Enalapril Maleate (Vasotec)  10 mg PO DAILY [facility]


   Last Admin: 05/09/17 08:57 Dose:  10 mg


Famotidine (Pepcid)  20 mg PO BID [facility]


   Last Admin: 05/09/17 16:09 Dose:  20 mg


Fenofibrate (Tricor)  145 mg PO DAILY [facility]


   Last Admin: 05/09/17 08:57 Dose:  145 mg


Metoprolol Tartrate (Lopressor)  50 mg PO Q12 [facility]


   Last Admin: 05/09/17 21:05 Dose:  50 mg


Quetiapine Fumarate (Seroquel)  25 mg PO HS [facility]


   Last Admin: 05/09/17 21:05 Dose:  25 mg











- Labs


Labs: 


 





 03/23/17 05:45 





 03/23/17 05:45 





 











PT  11.2 SECONDS (9.4-12.0)   12/14/15  05:20    


 


INR  1.05  (0.89-1.20)   12/14/15  05:20    


 


APTT  36.2 SECONDS (23.1-32.0)  H  12/14/15  05:20    














- Constitutional


Appears: Well, Non-toxic, No Acute Distress





- Head Exam


Head Exam: ATRAUMATIC, NORMAL INSPECTION, NORMOCEPHALIC





- Eye Exam


Eye Exam: EOMI, Normal appearance, PERRL


Pupil Exam: NORMAL ACCOMODATION, PERRL





- ENT Exam


ENT Exam: Mucous Membranes Moist, Normal Exam





- Neck Exam


Neck Exam: Full ROM, Normal Inspection.  absent: Lymphadenopathy





- Respiratory Exam


Respiratory Exam: Clear to Ausculation Bilateral, NORMAL BREATHING PATTERN





- Cardiovascular Exam


Cardiovascular Exam: REGULAR RHYTHM, RRR, +S1, +S2.  absent: Murmur





- GI/Abdominal Exam


GI & Abdominal Exam: Soft, Normal Bowel Sounds.  absent: Tenderness





- Rectal Exam


Rectal Exam: NORMAL INSPECTION





- Extremities Exam


Extremities Exam: Full ROM, Normal Capillary Refill, Normal Inspection.  absent

: Joint Swelling, Pedal Edema





- Back Exam


Back Exam: NORMAL INSPECTION





- Neurological Exam


Neurological Exam: Alert, Awake, CN II-XII Intact, Normal Gait, Oriented x3





- Psychiatric Exam


Psychiatric exam: Normal Affect, Normal Mood





- Skin


Skin Exam: Dry, Intact, Normal Color, Warm





Assessment and Plan


(1) DVT prophylaxis


Status: Chronic   





(2) Scrotal edema


Status: Chronic   





(3) CAD (coronary artery disease)


Status: Chronic   





(4) Smoking


Status: Chronic   





(5) Low back pain


Status: Chronic   





(6) Prediabetes


Status: Chronic   





(7) Neurocognitive disorder


Status: Chronic   





- Assessment and Plan (Free Text)


Assessment: 





cont meds, palcement, safety

## 2017-05-11 RX ADMIN — DIVALPROEX SODIUM SCH MG: 250 TABLET, DELAYED RELEASE ORAL at 17:03

## 2017-05-11 RX ADMIN — DIVALPROEX SODIUM SCH MG: 250 TABLET, DELAYED RELEASE ORAL at 09:22

## 2017-05-11 NOTE — CP.PCM.PN
Subjective





- Date & Time of Evaluation


Date of Evaluation: 05/11/17


Time of Evaluation: 08:00





- Subjective


Subjective: 





no complaints/distress


no f/c, n/v/d


pending placement





Objective





- Vital Signs/Intake and Output


Vital Signs (last 24 hours): 


 











Temp Pulse Resp BP Pulse Ox


 


 97.8 F   70   18   111/71   95 


 


 05/11/17 00:53  05/11/17 00:53  05/11/17 00:53  05/11/17 00:53  05/11/17 00:53











- Medications


Medications: 


 Current Medications





Aspirin (Ecotrin)  [facility]ILY [facility]


   Last Admin: 05/10/17 08:43 Dose:  81 mg


Atorvastatin Calcium (Lipitor)  [facility] PO HS [facility]


   Last Admin: 05/10/17 21:05 Dose:  20 mg


Divalproex Sodium (Depakote Dr(*Bid*))  500 mg [facility] BID [facility]


   Last Admin: 05/10/17 17:13 Dose:  500 mg


Enalapril Maleate (Vasotec)  10 mg PO DAILY [facility]


   Last Admin: 05/10/17 08:43 Dose:  10 mg


Famotidine (Pepcid)  20 mg [facility] BID [facility]


   Last Admin: 05/10/17 17:13 Dose:  20 mg


Fenofibrate (Tricor)  145 mg PO [facility]ILY [facility]


   Last Admin: 05/10/17 08:43 Dose:  145 mg


Metoprolol Tartrate (Lopressor)  50 mg PO Q12 [facility]


   Last Admin: 05/10/17 21:05 Dose:  50 mg


Quetiapine Fumarate (Seroquel)  25 mg PO HS [facility]


   Last Admin: 05/10/17 21:05 Dose:  25 mg











- Labs


Labs: 


 





 03/23/17 05:45 





 03/23/17 05:45 





 











PT  11.2 SECONDS (9.4-12.0)   12/14/15  05:20    


 


INR  1.05  (0.89-1.20)   12/14/15  05:20    


 


APTT  36.2 SECONDS (23.1-32.0)  H  12/14/15  05:20    














- Constitutional


Appears: Well, Non-toxic, No Acute Distress





- Head Exam


Head Exam: ATRAUMATIC, NORMAL INSPECTION, NORMOCEPHALIC





- Eye Exam


Eye Exam: EOMI, Normal appearance, PERRL


Pupil Exam: NORMAL ACCOMODATION, PERRL





- ENT Exam


ENT Exam: Mucous Membranes Moist, Normal Exam





- Neck Exam


Neck Exam: Full ROM, Normal Inspection.  absent: Lymphadenopathy





- Respiratory Exam


Respiratory Exam: Clear to Ausculation Bilateral, NORMAL BREATHING PATTERN





- Cardiovascular Exam


Cardiovascular Exam: REGULAR RHYTHM, RRR, +S1, +S2.  absent: Murmur





- GI/Abdominal Exam


GI & Abdominal Exam: Soft, Normal Bowel Sounds.  absent: Tenderness





- Extremities Exam


Extremities Exam: Full ROM, Normal Capillary Refill, Normal Inspection.  absent

: Joint Swelling, Pedal Edema





- Back Exam


Back Exam: NORMAL INSPECTION





- Neurological Exam


Neurological Exam: Alert, Awake, CN II-XII Intact, Normal Gait, Oriented x3





- Psychiatric Exam


Psychiatric exam: Normal Affect, Normal Mood





- Skin


Skin Exam: Dry, Intact, Normal Color, Warm





Assessment and Plan


(1) DVT prophylaxis


Status: Chronic   





(2) Scrotal edema


Status: Chronic   





(3) CAD (coronary artery disease)


Status: Chronic   





(4) Smoking


Status: Chronic   





(5) Low back pain


Status: Chronic   





(6) Prediabetes


Status: Chronic   





(7) Neurocognitive disorder


Status: Chronic   





- Assessment and Plan (Free Text)


Assessment: 





ont meds placement safety

## 2017-05-12 RX ADMIN — DIVALPROEX SODIUM SCH MG: 250 TABLET, DELAYED RELEASE ORAL at 16:28

## 2017-05-12 RX ADMIN — DIVALPROEX SODIUM SCH MG: 250 TABLET, DELAYED RELEASE ORAL at 08:35

## 2017-05-12 NOTE — CP.PCM.PN
Subjective





- Date & Time of Evaluation


Date of Evaluation: 05/12/17


Time of Evaluation: 07:44





- Subjective


Subjective: 





no f/c, n/v/d


pendig placement


no complaints/distress





Objective





- Vital Signs/Intake and Output


Vital Signs (last 24 hours): 


 











Temp Pulse Resp BP Pulse Ox


 


 98.7 F   78   19   110/70   94 L


 


 05/12/17 00:00  05/12/17 00:00  05/12/17 00:00  05/12/17 00:00  05/12/17 00:00











- Medications


Medications: 


 Current Medications





Aspirin (Ecotrin)  81 mg PO DAILY [facility]


   Last Admin: 05/11/17 09:24 Dose:  81 mg


Atorvastatin Calcium (Lipitor)  20 mg PO HS [facility]


   Last Admin: 05/11/17 22:53 Dose:  20 mg


Divalproex Sodium (Depakote Dr(*Bid*))  500 mg PO BID [facility]


   Last Admin: 05/11/17 17:03 Dose:  500 mg


Enalapril Maleate (Vasotec)  10 mg PO DAILY [facility]


   Last Admin: 05/11/17 17:03 Dose:  10 mg


Famotidine (Pepcid)  20 mg PO BID [facility]


   Last Admin: 05/11/17 17:03 Dose:  20 mg


Fenofibrate (Tricor)  145 mg PO DAILY [facility]


   Last Admin: 05/11/17 09:24 Dose:  145 mg


Metoprolol Tartrate (Lopressor)  50 mg PO Q12 [facility]


   Last Admin: 05/11/17 22:53 Dose:  Not Given


Quetiapine Fumarate (Seroquel)  25 mg PO HS [facility]


   Last Admin: 05/11/17 22:53 Dose:  25 mg











- Labs


Labs: 


 





 03/23/17 05:45 





 03/23/17 05:45 





 











PT  11.2 SECONDS (9.4-12.0)   12/14/15  05:20    


 


INR  1.05  (0.89-1.20)   12/14/15  05:20    


 


APTT  36.2 SECONDS (23.1-32.0)  H  12/14/15  05:20    














- Constitutional


Appears: Well, Non-toxic, No Acute Distress





- Head Exam


Head Exam: ATRAUMATIC, NORMAL INSPECTION, NORMOCEPHALIC





- Eye Exam


Eye Exam: EOMI, Normal appearance, PERRL


Pupil Exam: NORMAL ACCOMODATION, PERRL





- ENT Exam


ENT Exam: Mucous Membranes Moist, Normal Exam





- Neck Exam


Neck Exam: Full ROM, Normal Inspection.  absent: Lymphadenopathy





- Respiratory Exam


Respiratory Exam: Clear to Ausculation Bilateral, NORMAL BREATHING PATTERN





- Cardiovascular Exam


Cardiovascular Exam: REGULAR RHYTHM, RRR, +S1, +S2.  absent: Murmur





- GI/Abdominal Exam


GI & Abdominal Exam: Soft, Normal Bowel Sounds.  absent: Tenderness





- Extremities Exam


Extremities Exam: Full ROM, Normal Capillary Refill, Normal Inspection.  absent

: Joint Swelling, Pedal Edema





- Back Exam


Back Exam: NORMAL INSPECTION





- Neurological Exam


Neurological Exam: Alert, Awake, CN II-XII Intact, Normal Gait, Oriented x3





- Psychiatric Exam


Psychiatric exam: Normal Affect, Normal Mood





- Skin


Skin Exam: Dry, Intact, Normal Color, Warm





Assessment and Plan


(1) DVT prophylaxis


Status: Chronic   





(2) Scrotal edema


Status: Chronic   





(3) CAD (coronary artery disease)


Status: Chronic   





(4) Smoking


Status: Chronic   





(5) Low back pain


Status: Chronic   





(6) Prediabetes


Status: Chronic   





(7) Neurocognitive disorder


Status: Chronic   





- Assessment and Plan (Free Text)


Assessment: 





cont plan, meds safety

## 2017-05-13 RX ADMIN — DIVALPROEX SODIUM SCH MG: 250 TABLET, DELAYED RELEASE ORAL at 09:28

## 2017-05-13 RX ADMIN — DIVALPROEX SODIUM SCH MG: 250 TABLET, DELAYED RELEASE ORAL at 16:17

## 2017-05-13 NOTE — CP.PCM.PN
Subjective





- Date & Time of Evaluation


Date of Evaluation: 05/13/17


Time of Evaluation: 08:33





- Subjective


Subjective: 





no complaints/distress


no f/c, n/v/d


placment pending





Objective





- Vital Signs/Intake and Output


Vital Signs (last 24 hours): 


 











Temp Pulse Resp BP Pulse Ox


 


 97.7 F   71   18   108/75   97 


 


 05/13/17 07:48  05/13/17 07:48  05/13/17 07:48  05/13/17 07:48  05/13/17 07:48











- Medications


Medications: 


 Current Medications





Aspirin (Ecotrin)  81 mg PO DAILY [facility]


   Last Admin: 05/12/17 08:36 Dose:  81 mg


Atorvastatin Calcium (Lipitor)  20 mg PO HS [facility]


   Last Admin: 05/12/17 21:40 Dose:  20 mg


Divalproex Sodium (Depakote Dr(*Bid*))  500 mg PO BID [facility]


   Last Admin: 05/12/17 16:28 Dose:  500 mg


Enalapril Maleate (Vasotec)  10 mg PO DAILY [facility]


   Last Admin: 05/12/17 08:37 Dose:  10 mg


Famotidine (Pepcid)  20 mg PO BID [facility]


   Last Admin: 05/12/17 16:28 Dose:  20 mg


Fenofibrate (Tricor)  145 mg PO DAILY [facility]


   Last Admin: 05/12/17 08:36 Dose:  145 mg


Metoprolol Tartrate (Lopressor)  50 mg PO Q12 [facility]


   Last Admin: 05/12/17 21:40 Dose:  50 mg


Quetiapine Fumarate (Seroquel)  25 mg PO HS [facility]


   Last Admin: 05/12/17 21:40 Dose:  25 mg











- Labs


Labs: 


 





 03/23/17 05:45 





 03/23/17 05:45 





 











PT  11.2 SECONDS (9.4-12.0)   12/14/15  05:20    


 


INR  1.05  (0.89-1.20)   12/14/15  05:20    


 


APTT  36.2 SECONDS (23.1-32.0)  H  12/14/15  05:20    














- Constitutional


Appears: Well, Non-toxic, No Acute Distress





- Head Exam


Head Exam: ATRAUMATIC, NORMAL INSPECTION, NORMOCEPHALIC





- Eye Exam


Eye Exam: EOMI, Normal appearance, PERRL


Pupil Exam: NORMAL ACCOMODATION, PERRL





- ENT Exam


ENT Exam: Mucous Membranes Moist, Normal Exam





- Neck Exam


Neck Exam: Full ROM, Normal Inspection.  absent: Lymphadenopathy





- Respiratory Exam


Respiratory Exam: Clear to Ausculation Bilateral, NORMAL BREATHING PATTERN





- Cardiovascular Exam


Cardiovascular Exam: REGULAR RHYTHM, RRR, +S1, +S2.  absent: Murmur





- GI/Abdominal Exam


GI & Abdominal Exam: Soft, Normal Bowel Sounds.  absent: Tenderness





- Extremities Exam


Extremities Exam: Full ROM, Normal Capillary Refill, Normal Inspection.  absent

: Joint Swelling, Pedal Edema





- Back Exam


Back Exam: NORMAL INSPECTION





- Neurological Exam


Neurological Exam: Alert, Awake, CN II-XII Intact, Normal Gait, Oriented x3





- Psychiatric Exam


Psychiatric exam: Normal Affect, Normal Mood





- Skin


Skin Exam: Dry, Intact, Normal Color, Warm





Assessment and Plan


(1) DVT prophylaxis


Status: Chronic   





(2) Scrotal edema


Status: Chronic   





(3) CAD (coronary artery disease)


Status: Chronic   





(4) Smoking


Status: Chronic   





(5) Low back pain


Status: Chronic   





(6) Prediabetes


Status: Chronic   





(7) Neurocognitive disorder


Status: Chronic   





- Assessment and Plan (Free Text)


Assessment: 





pending palcement, meds, safety

## 2017-05-14 RX ADMIN — DIVALPROEX SODIUM SCH MG: 250 TABLET, DELAYED RELEASE ORAL at 17:12

## 2017-05-14 RX ADMIN — DIVALPROEX SODIUM SCH MG: 250 TABLET, DELAYED RELEASE ORAL at 09:07

## 2017-05-14 NOTE — CP.PCM.PN
Subjective





- Date & Time of Evaluation


Date of Evaluation: 05/14/17


Time of Evaluation: 10:56





- Subjective


Subjective: 





no complaints/distress


no f/c, n/v/d


pending palcement





Objective





- Vital Signs/Intake and Output


Vital Signs (last 24 hours): 


 











Temp Pulse Resp BP Pulse Ox


 


 97.8 F   57 L  20   91/64 L  97 


 


 05/14/17 08:09  05/14/17 09:07  05/14/17 08:09  05/14/17 09:07  05/14/17 08:09











- Medications


Medications: 


 Current Medications





Aspirin (Ecotrin)  81 mg PO DAILY [facility]


   Last Admin: 05/14/17 09:08 Dose:  81 mg


Atorvastatin Calcium (Lipitor)  20 mg PO HS [facility]


   Last Admin: 05/13/17 21:37 Dose:  20 mg


Divalproex Sodium (Depakote Dr(*Bid*))  500 mg PO BID [facility]


   Last Admin: 05/14/17 09:07 Dose:  500 mg


Enalapril Maleate (Vasotec)  10 mg PO DAILY [facility]


   Last Admin: 05/14/17 09:06 Dose:  10 mg


Famotidine (Pepcid)  20 mg PO BID [facility]


   Last Admin: 05/14/17 09:06 Dose:  20 mg


Fenofibrate (Tricor)  145 mg PO DAILY [facility]


   Last Admin: 05/14/17 09:06 Dose:  145 mg


Metoprolol Tartrate (Lopressor)  50 mg PO Q12 [facility]


   Last Admin: 05/14/17 09:07 Dose:  Not Given


Quetiapine Fumarate (Seroquel)  25 mg PO HS [facility]


   Last Admin: 05/13/17 21:37 Dose:  25 mg











- Labs


Labs: 


 





 03/23/17 05:45 





 03/23/17 05:45 





 











PT  11.2 SECONDS (9.4-12.0)   12/14/15  05:20    


 


INR  1.05  (0.89-1.20)   12/14/15  05:20    


 


APTT  36.2 SECONDS (23.1-32.0)  H  12/14/15  05:20    














- Constitutional


Appears: Well, Non-toxic, No Acute Distress





- Head Exam


Head Exam: ATRAUMATIC, NORMAL INSPECTION, NORMOCEPHALIC





- Eye Exam


Eye Exam: EOMI, Normal appearance, PERRL


Pupil Exam: NORMAL ACCOMODATION, PERRL





- ENT Exam


ENT Exam: Mucous Membranes Moist, Normal Exam





- Neck Exam


Neck Exam: Full ROM, Normal Inspection.  absent: Lymphadenopathy





- Respiratory Exam


Respiratory Exam: Clear to Ausculation Bilateral, NORMAL BREATHING PATTERN





- Cardiovascular Exam


Cardiovascular Exam: REGULAR RHYTHM, +S1, +S2.  absent: Murmur





- GI/Abdominal Exam


GI & Abdominal Exam: Soft, Normal Bowel Sounds.  absent: Tenderness





- Extremities Exam


Extremities Exam: Full ROM, Normal Capillary Refill, Normal Inspection.  absent

: Joint Swelling, Pedal Edema





- Back Exam


Back Exam: NORMAL INSPECTION





- Neurological Exam


Neurological Exam: Alert, Awake, CN II-XII Intact, Normal Gait, Oriented x3





- Psychiatric Exam


Psychiatric exam: Normal Affect, Normal Mood





- Skin


Skin Exam: Dry, Intact, Normal Color, Warm





Assessment and Plan


(1) DVT prophylaxis


Status: Chronic   





(2) Scrotal edema


Status: Chronic   





(3) CAD (coronary artery disease)


Status: Chronic   





(4) Smoking


Status: Chronic   





(5) Low back pain


Status: Chronic   





(6) Prediabetes


Status: Chronic   





(7) Neurocognitive disorder


Status: Chronic   





- Assessment and Plan (Free Text)


Assessment: 





cont meds, safety, placement

## 2017-05-15 RX ADMIN — DIVALPROEX SODIUM SCH MG: 250 TABLET, DELAYED RELEASE ORAL at 08:59

## 2017-05-15 RX ADMIN — DIVALPROEX SODIUM SCH MG: 250 TABLET, DELAYED RELEASE ORAL at 17:20

## 2017-05-15 NOTE — CP.PCM.PN
Subjective





- Date & Time of Evaluation


Date of Evaluation: 05/15/17


Time of Evaluation: 07:24





- Subjective


Subjective: 





no complaints/distress


no f/c, n/v/d


pending palcement





Objective





- Vital Signs/Intake and Output


Vital Signs (last 24 hours): 


 











Temp Pulse Resp BP Pulse Ox


 


 98.1 F   69   20   103/73   97 


 


 05/15/17 00:08  05/15/17 00:08  05/15/17 00:08  05/15/17 00:08  05/15/17 00:08











- Medications


Medications: 


 Current Medications





Aspirin (Ecotrin)  81 mg PO DAILY [facility]


   Last Admin: 05/14/17 09:08 Dose:  81 mg


Atorvastatin Calcium (Lipitor)  20 mg PO HS [facility]


   Last Admin: 05/14/17 21:04 Dose:  20 mg


Divalproex Sodium (Depakote Dr(*Bid*))  500 mg PO BID [facility]


   Last Admin: 05/14/17 17:12 Dose:  500 mg


Enalapril Maleate (Vasotec)  10 mg PO DAILY [facility]


   Last Admin: 05/14/17 09:06 Dose:  10 mg


Famotidine (Pepcid)  20 mg PO BID [facility]


   Last Admin: 05/14/17 17:12 Dose:  20 mg


Fenofibrate (Tricor)  145 mg PO DAILY [facility]


   Last Admin: 05/14/17 09:06 Dose:  145 mg


Metoprolol Tartrate (Lopressor)  50 mg PO Q12 [facility]


   Last Admin: 05/14/17 21:04 Dose:  50 mg


Quetiapine Fumarate (Seroquel)  25 mg PO HS [facility]


   Last Admin: 05/14/17 21:04 Dose:  25 mg











- Labs


Labs: 


 





 03/23/17 05:45 





 03/23/17 05:45 





 











PT  11.2 SECONDS (9.4-12.0)   12/14/15  05:20    


 


INR  1.05  (0.89-1.20)   12/14/15  05:20    


 


APTT  36.2 SECONDS (23.1-32.0)  H  12/14/15  05:20    














- Constitutional


Appears: Well, Non-toxic, No Acute Distress





- Head Exam


Head Exam: ATRAUMATIC, NORMAL INSPECTION, NORMOCEPHALIC





- Eye Exam


Eye Exam: EOMI, Normal appearance, PERRL


Pupil Exam: NORMAL ACCOMODATION, PERRL





- ENT Exam


ENT Exam: Mucous Membranes Moist, Normal Exam





- Neck Exam


Neck Exam: Full ROM, Normal Inspection.  absent: Lymphadenopathy





- Respiratory Exam


Respiratory Exam: Clear to Ausculation Bilateral, NORMAL BREATHING PATTERN





- Cardiovascular Exam


Cardiovascular Exam: REGULAR RHYTHM, RRR, +S1, +S2.  absent: Murmur





- GI/Abdominal Exam


GI & Abdominal Exam: Soft, Normal Bowel Sounds.  absent: Tenderness





- Extremities Exam


Extremities Exam: Full ROM, Normal Capillary Refill, Normal Inspection.  absent

: Joint Swelling, Pedal Edema





- Back Exam


Back Exam: NORMAL INSPECTION





- Neurological Exam


Neurological Exam: Alert, Awake, CN II-XII Intact, Normal Gait, Oriented x3





- Psychiatric Exam


Psychiatric exam: Normal Affect, Normal Mood





- Skin


Skin Exam: Dry, Intact, Normal Color, Warm





Assessment and Plan


(1) DVT prophylaxis


Status: Chronic   





(2) Scrotal edema


Status: Chronic   





(3) CAD (coronary artery disease)


Status: Chronic   





(4) Smoking


Status: Chronic   





(5) Low back pain


Status: Chronic   





(6) Prediabetes


Status: Chronic   





(7) Neurocognitive disorder


Status: Chronic   





- Assessment and Plan (Free Text)


Assessment: 





pending placement, cont meds, safety

## 2017-05-16 RX ADMIN — DIVALPROEX SODIUM SCH MG: 250 TABLET, DELAYED RELEASE ORAL at 16:39

## 2017-05-16 RX ADMIN — DIVALPROEX SODIUM SCH MG: 250 TABLET, DELAYED RELEASE ORAL at 09:40

## 2017-05-16 NOTE — CP.PCM.PN
Subjective





- Date & Time of Evaluation


Date of Evaluation: 05/16/17


Time of Evaluation: 08:38





- Subjective


Subjective: 





no complaitns/distress


no f/c, n/v/d


pending palcement





Objective





- Vital Signs/Intake and Output


Vital Signs (last 24 hours): 


 











Temp Pulse Resp BP Pulse Ox


 


 98.2 F   80   19   110/74   97 


 


 05/16/17 00:00  05/16/17 00:00  05/16/17 00:00  05/16/17 00:00  05/16/17 00:00











- Medications


Medications: 


 Current Medications





Aspirin (Ecotrin)  81 mg PO DAILY [facility]


   Last Admin: 05/15/17 08:59 Dose:  81 mg


Atorvastatin Calcium (Lipitor)  20 mg PO HS [facility]


   Last Admin: 05/15/17 21:54 Dose:  20 mg


Divalproex Sodium (Depakote Dr(*Bid*))  500 mg PO BID [facility]


   Last Admin: 05/15/17 17:20 Dose:  500 mg


Enalapril Maleate (Vasotec)  10 mg PO DAILY [facility]


   Last Admin: 05/15/17 09:00 Dose:  10 mg


Famotidine (Pepcid)  20 mg PO BID [facility]


   Last Admin: 05/15/17 17:20 Dose:  20 mg


Fenofibrate (Tricor)  145 mg PO DAILY [facility]


   Last Admin: 05/15/17 08:59 Dose:  145 mg


Metoprolol Tartrate (Lopressor)  50 mg PO Q12 [facility]


   Last Admin: 05/15/17 21:54 Dose:  50 mg


Quetiapine Fumarate (Seroquel)  25 mg PO HS [facility]


   Last Admin: 05/15/17 21:54 Dose:  25 mg











- Labs


Labs: 


 





 03/23/17 05:45 





 03/23/17 05:45 





 











PT  11.2 SECONDS (9.4-12.0)   12/14/15  05:20    


 


INR  1.05  (0.89-1.20)   12/14/15  05:20    


 


APTT  36.2 SECONDS (23.1-32.0)  H  12/14/15  05:20    














- Constitutional


Appears: Well, Non-toxic, No Acute Distress





- Head Exam


Head Exam: ATRAUMATIC, NORMAL INSPECTION, NORMOCEPHALIC





- Eye Exam


Eye Exam: EOMI, Normal appearance, PERRL


Pupil Exam: NORMAL ACCOMODATION, PERRL





- ENT Exam


ENT Exam: Mucous Membranes Moist, Normal Exam





- Neck Exam


Neck Exam: Full ROM, Normal Inspection.  absent: Lymphadenopathy





- Respiratory Exam


Respiratory Exam: Clear to Ausculation Bilateral, NORMAL BREATHING PATTERN





- Cardiovascular Exam


Cardiovascular Exam: REGULAR RHYTHM, RRR, +S1, +S2.  absent: Murmur





- GI/Abdominal Exam


GI & Abdominal Exam: Soft, Normal Bowel Sounds.  absent: Tenderness





- Extremities Exam


Extremities Exam: Full ROM, Normal Capillary Refill, Normal Inspection.  absent

: Joint Swelling, Pedal Edema





- Back Exam


Back Exam: NORMAL INSPECTION





- Neurological Exam


Neurological Exam: Alert, Awake, CN II-XII Intact, Normal Gait, Oriented x3





- Psychiatric Exam


Psychiatric exam: Normal Affect, Normal Mood





- Skin


Skin Exam: Dry, Intact, Normal Color, Warm





Assessment and Plan


(1) DVT prophylaxis


Status: Chronic   





(2) Scrotal edema


Status: Chronic   





(3) CAD (coronary artery disease)


Status: Chronic   





(4) Smoking


Status: Chronic   





(5) Low back pain


Status: Chronic   





(6) Prediabetes


Status: Chronic   





(7) Neurocognitive disorder


Status: Chronic   





- Assessment and Plan (Free Text)


Assessment: 





cont meds, interventions, safety

## 2017-05-17 RX ADMIN — DIVALPROEX SODIUM SCH MG: 250 TABLET, DELAYED RELEASE ORAL at 16:32

## 2017-05-17 RX ADMIN — DIVALPROEX SODIUM SCH MG: 250 TABLET, DELAYED RELEASE ORAL at 08:37

## 2017-05-17 NOTE — CP.PCM.PN
Subjective





- Date & Time of Evaluation


Date of Evaluation: 05/17/17


Time of Evaluation: 08:06





- Subjective


Subjective: 





no complaints/distress


no f/c, n/v/d


pneidng palcement





Objective





- Vital Signs/Intake and Output


Vital Signs (last 24 hours): 


 











Temp Pulse Resp BP Pulse Ox


 


 97.5 F L  60   17   96/63 L  97 


 


 05/17/17 07:46  05/17/17 07:46  05/17/17 07:46  05/17/17 07:46  05/17/17 07:46











- Medications


Medications: 


 Current Medications





Aspirin (Ecotrin)  81 mg PO DAILY [facility]


   Last Admin: 05/16/17 09:40 Dose:  81 mg


Atorvastatin Calcium (Lipitor)  20 mg PO [facility]


   Last Admin: 05/16/17 21:24 Dose:  20 mg


Divalproex Sodium (Depakote Dr(*Bid*))  500 mg PO BID [facility]


   Last Admin: 05/16/17 16:39 Dose:  500 mg


Enalapril Maleate (Vasotec)  10 mg PO DAILY [facility]


   Last Admin: 05/16/17 09:41 Dose:  10 mg


Famotidine (Pepcid)  20 mg PO BID [facility]


   Last Admin: 05/16/17 16:41 Dose:  20 mg


Fenofibrate (Tricor)  145 mg PO DAILY [facility]


   Last Admin: 05/16/17 09:41 Dose:  145 mg


Metoprolol Tartrate (Lopressor)  50 mg PO Q12 [facility]


   Last Admin: 05/16/17 21:23 Dose:  50 mg


Quetiapine Fumarate (Seroquel)  25 mg PO HS [facility]


   Last Admin: 05/16/17 21:24 Dose:  25 mg











- Labs


Labs: 


 





 03/23/17 05:45 





 03/23/17 05:45 





 











PT  11.2 SECONDS (9.4-12.0)   12/14/15  05:20    


 


INR  1.05  (0.89-1.20)   12/14/15  05:20    


 


APTT  36.2 SECONDS (23.1-32.0)  H  12/14/15  05:20    














- Constitutional


Appears: Well, Non-toxic, No Acute Distress





- Head Exam


Head Exam: ATRAUMATIC, NORMAL INSPECTION, NORMOCEPHALIC





- Eye Exam


Eye Exam: EOMI, Normal appearance, PERRL


Pupil Exam: NORMAL ACCOMODATION, PERRL





- ENT Exam


ENT Exam: Mucous Membranes Moist, Normal Exam





- Neck Exam


Neck Exam: Full ROM, Normal Inspection.  absent: Lymphadenopathy





- Respiratory Exam


Respiratory Exam: Clear to Ausculation Bilateral, NORMAL BREATHING PATTERN





- Cardiovascular Exam


Cardiovascular Exam: REGULAR RHYTHM, RRR, +S1, +S2.  absent: Murmur





- GI/Abdominal Exam


GI & Abdominal Exam: Soft, Normal Bowel Sounds.  absent: Tenderness





- Extremities Exam


Extremities Exam: Full ROM, Normal Capillary Refill, Normal Inspection.  absent

: Joint Swelling, Pedal Edema





- Back Exam


Back Exam: NORMAL INSPECTION





- Neurological Exam


Neurological Exam: Alert, Awake, CN II-XII Intact, Normal Gait, Oriented x3





- Psychiatric Exam


Psychiatric exam: Normal Affect, Normal Mood





- Skin


Skin Exam: Dry, Intact, Normal Color, Warm





Assessment and Plan


(1) DVT prophylaxis


Status: Chronic   





(2) Scrotal edema


Status: Chronic   





(3) CAD (coronary artery disease)


Status: Chronic   





(4) Smoking


Status: Chronic   





(5) Low back pain


Status: Chronic   





(6) Prediabetes


Status: Chronic   





(7) Neurocognitive disorder


Status: Chronic   





- Assessment and Plan (Free Text)


Assessment: 





pending palcement, cont safety, meds

## 2017-05-18 NOTE — PN
DATE: 05/18/2017



The patient evaluated in bed, in no distress.  No complaints.  No fever, chills, nausea, vomiting, di
arrhea.  The patient remains overall depressed.



REVIEW OF SYSTEMS:  Negative.



PHYSICAL EXAMINATION:

GENERAL:  Alert and oriented at his baseline.

HEART:  Regular rate and rhythm.  No murmurs, rubs or gallops.

LUNGS:  Clear in all fields bilaterally.

ABDOMEN:  Soft, nontender.  Bowel sounds x 4.

EXTREMITIES:  Distal pulses, motor and sensation intact.



DIAGNOSES AND PLAN:  Continue all medications, safety and intervention.  We will continue to follow t
hrough placement.





__________________________________________

Fan SILVER







cc:



DD: 05/18/2017 07:10:39  [id number]

TT: 05/18/2017 07:17:10

Confirmation # [id number]

Dictation # [id number]

tn

## 2017-05-19 RX ADMIN — DIVALPROEX SODIUM SCH MG: 250 TABLET, DELAYED RELEASE ORAL at 09:16

## 2017-05-19 RX ADMIN — DIVALPROEX SODIUM SCH: 250 TABLET, DELAYED RELEASE ORAL at 10:39

## 2017-05-19 RX ADMIN — DIVALPROEX SODIUM SCH MG: 250 TABLET, DELAYED RELEASE ORAL at 17:11

## 2017-05-19 RX ADMIN — DIVALPROEX SODIUM SCH: 250 TABLET, DELAYED RELEASE ORAL at 10:38

## 2017-05-19 NOTE — CP.PCM.PN
Subjective





- Date & Time of Evaluation


Date of Evaluation: 05/19/17


Time of Evaluation: 07:13





- Subjective


Subjective: 





nocomplaints/distres


penidng palcement





Objective





- Vital Signs/Intake and Output


Vital Signs (last 24 hours): 


 











Temp Pulse Resp BP Pulse Ox


 


 98.6 F   78   19   106/71   96 


 


 05/19/17 00:00  05/19/17 00:00  05/19/17 00:00  05/19/17 00:00  05/19/17 00:00











- Medications


Medications: 


 Current Medications





Aspirin (Ecotrin)  81 mg PO DAILY [facility]


   Last Admin: 05/17/17 08:37 Dose:  81 mg


Atorvastatin Calcium (Lipitor)  20 mg PO HS [facility]


   Last Admin: 05/18/17 21:24 Dose:  20 mg


Divalproex Sodium (Depakote Dr(*Bid*))  500 mg PO BID [facility]


   Last Admin: 05/17/17 16:32 Dose:  500 mg


Enalapril Maleate (Vasotec)  10 mg PO DAILY [facility]


   Last Admin: 05/17/17 08:38 Dose:  10 mg


Famotidine (Pepcid)  20 mg PO BID [facility]


   Last Admin: 05/17/17 16:32 Dose:  20 mg


Fenofibrate (Tricor)  145 mg PO DAILY [facility]


   Last Admin: 05/17/17 08:38 Dose:  145 mg


Metoprolol Tartrate (Lopressor)  50 mg PO Q12 [facility]


   Last Admin: 05/18/17 21:24 Dose:  50 mg


Quetiapine Fumarate (Seroquel)  25 mg PO HS [facility]


   Last Admin: 05/18/17 21:24 Dose:  25 mg











- Labs


Labs: 


 





 03/23/17 05:45 





 03/23/17 05:45 





 











PT  11.2 SECONDS (9.4-12.0)   12/14/15  05:20    


 


INR  1.05  (0.89-1.20)   12/14/15  05:20    


 


APTT  36.2 SECONDS (23.1-32.0)  H  12/14/15  05:20    














Assessment and Plan


(1) DVT prophylaxis


Status: Chronic   





(2) Scrotal edema


Status: Chronic   





(3) CAD (coronary artery disease)


Status: Chronic   





(4) Smoking


Status: Chronic   





(5) Low back pain


Status: Chronic   





(6) Prediabetes


Status: Chronic   





(7) Neurocognitive disorder


Status: Chronic

## 2017-05-19 NOTE — CP.PCM.PN
Subjective





- Date & Time of Evaluation


Date of Evaluation: 05/19/17


Time of Evaluation: 15:03





- Subjective


Subjective: 





nof./c, n/v/d


pending placement


no complaints/distress





Objective





- Vital Signs/Intake and Output


Vital Signs (last 24 hours): 


 











Temp Pulse Resp BP Pulse Ox


 


 97.4 F L  72   16   110/74   97 


 


 05/19/17 09:00  05/19/17 09:16  05/19/17 07:48  05/19/17 09:16  05/19/17 07:48











- Medications


Medications: 


 Current Medications





Aspirin (Ecotrin)  81 mg PO DAILY [facility]


   Last Admin: 05/19/17 10:39 Dose:  Not Given


Atorvastatin Calcium (Lipitor)  20 mg PO [facility]


   Last Admin: 05/18/17 21:24 Dose:  20 mg


Divalproex Sodium (Depakote Dr(*Bid*))  500 mg PO BID [facility]


   Last Admin: 05/19/17 10:39 Dose:  Not Given


Enalapril Maleate (Vasotec)  10 mg PO DAILY [facility]


   Last Admin: 05/19/17 10:40 Dose:  Not Given


Famotidine (Pepcid)  20 mg PO BID [facility]


   Last Admin: 05/19/17 10:40 Dose:  Not Given


Fenofibrate (Tricor)  145 mg PO DAILY [facility]


   Last Admin: 05/19/17 10:40 Dose:  Not Given


Metoprolol Tartrate (Lopressor)  50 mg PO Q12 [facility]


   Last Admin: 05/19/17 10:39 Dose:  Not Given


Quetiapine Fumarate (Seroquel)  25 mg PO HS [facility]


   Last Admin: 05/18/17 21:24 Dose:  25 mg











- Labs


Labs: 


 





 03/23/17 05:45 





 03/23/17 05:45 





 











PT  11.2 SECONDS (9.4-12.0)   12/14/15  05:20    


 


INR  1.05  (0.89-1.20)   12/14/15  05:20    


 


APTT  36.2 SECONDS (23.1-32.0)  H  12/14/15  05:20    














- Constitutional


Appears: Well, Non-toxic, No Acute Distress





- Head Exam


Head Exam: ATRAUMATIC, NORMAL INSPECTION, NORMOCEPHALIC





- Eye Exam


Eye Exam: EOMI, Normal appearance, PERRL


Pupil Exam: NORMAL ACCOMODATION, PERRL





- ENT Exam


ENT Exam: Mucous Membranes Moist, Normal Exam





- Neck Exam


Neck Exam: Full ROM, Normal Inspection.  absent: Lymphadenopathy





- Respiratory Exam


Respiratory Exam: Clear to Ausculation Bilateral, NORMAL BREATHING PATTERN





- Cardiovascular Exam


Cardiovascular Exam: REGULAR RHYTHM, +S1, +S2.  absent: Murmur





- GI/Abdominal Exam


GI & Abdominal Exam: Soft, Normal Bowel Sounds.  absent: Tenderness





- Extremities Exam


Extremities Exam: Full ROM, Normal Capillary Refill, Normal Inspection.  absent

: Joint Swelling, Pedal Edema





- Back Exam


Back Exam: NORMAL INSPECTION





- Neurological Exam


Neurological Exam: Alert, Awake, CN II-XII Intact, Normal Gait, Oriented x3





- Psychiatric Exam


Psychiatric exam: Normal Affect, Normal Mood





- Skin


Skin Exam: Dry, Intact, Normal Color, Warm





Assessment and Plan


(1) DVT prophylaxis


Status: Chronic   





(2) Scrotal edema


Status: Chronic   





(3) CAD (coronary artery disease)


Status: Chronic   





(4) Smoking


Status: Chronic   





(5) Low back pain


Status: Chronic   





(6) Prediabetes


Status: Chronic   





(7) Neurocognitive disorder


Status: Chronic   





- Assessment and Plan (Free Text)


Assessment: 





cont meds, safety, placement

## 2017-05-20 RX ADMIN — DIVALPROEX SODIUM SCH MG: 250 TABLET, DELAYED RELEASE ORAL at 08:57

## 2017-05-20 RX ADMIN — DIVALPROEX SODIUM SCH MG: 250 TABLET, DELAYED RELEASE ORAL at 16:58

## 2017-05-20 NOTE — CP.PCM.PN
Subjective





- Date & Time of Evaluation


Date of Evaluation: 05/20/17


Time of Evaluation: 18:00





- Subjective


Subjective: 





pt seen and examined at bedside. no acute distress, no complaints





Objective





- Vital Signs/Intake and Output


Vital Signs (last 24 hours): 


 











Temp Pulse Resp BP Pulse Ox


 


 98.5 F   63   20   112/72   98 


 


 05/20/17 16:04  05/20/17 16:04  05/20/17 16:04  05/20/17 16:04  05/20/17 16:04











- Medications


Medications: 


 Current Medications





Aspirin (Ecotrin)  81 mg PO DAILY [facility]


   Last Admin: 05/20/17 08:58 Dose:  81 mg


Atorvastatin Calcium (Lipitor)  20 mg PO HS [facility]


   Last Admin: 05/19/17 22:03 Dose:  20 mg


Divalproex Sodium (Depakote Dr(*Bid*))  500 mg PO BID [facility]


   Last Admin: 05/20/17 16:58 Dose:  500 mg


Enalapril Maleate (Vasotec)  10 mg PO DAILY [facility]


   Last Admin: 05/20/17 08:58 Dose:  10 mg


Famotidine (Pepcid)  20 mg PO BID [facility]


   Last Admin: 05/20/17 16:57 Dose:  20 mg


Fenofibrate (Tricor)  145 mg PO DAILY [facility]


   Last Admin: 05/20/17 08:58 Dose:  145 mg


Metoprolol Tartrate (Lopressor)  50 mg PO Q12 [facility]


   Last Admin: 05/20/17 08:57 Dose:  50 mg


Quetiapine Fumarate (Seroquel)  25 mg PO HS [facility]


   Last Admin: 05/19/17 22:03 Dose:  25 mg











- Labs


Labs: 


 





 03/23/17 05:45 





 03/23/17 05:45 





 











PT  11.2 SECONDS (9.4-12.0)   12/14/15  05:20    


 


INR  1.05  (0.89-1.20)   12/14/15  05:20    


 


APTT  36.2 SECONDS (23.1-32.0)  H  12/14/15  05:20    














- Constitutional


Appears: Well, Non-toxic





- Head Exam


Head Exam: ATRAUMATIC





- Eye Exam


Eye Exam: EOMI


Pupil Exam: NORMAL ACCOMODATION





- ENT Exam


ENT Exam: Mucous Membranes Moist





- Neck Exam


Neck Exam: Full ROM





- Respiratory Exam


Respiratory Exam: Clear to Ausculation Bilateral, NORMAL BREATHING PATTERN





- Cardiovascular Exam


Cardiovascular Exam: REGULAR RHYTHM





- GI/Abdominal Exam


GI & Abdominal Exam: Normal Bowel Sounds





- Rectal Exam


Rectal Exam: NORMAL INSPECTION





- Extremities Exam


Extremities Exam: Full ROM, Normal Inspection





Assessment and Plan





- Assessment and Plan (Free Text)


Plan: 





pt stable


cont same management

## 2017-05-21 RX ADMIN — DIVALPROEX SODIUM SCH MG: 250 TABLET, DELAYED RELEASE ORAL at 18:14

## 2017-05-22 RX ADMIN — DIVALPROEX SODIUM SCH MG: 250 TABLET, DELAYED RELEASE ORAL at 18:27

## 2017-05-22 RX ADMIN — DIVALPROEX SODIUM SCH MG: 250 TABLET, DELAYED RELEASE ORAL at 09:25

## 2017-05-22 NOTE — CP.PCM.PN
Subjective





- Date & Time of Evaluation


Date of Evaluation: 05/22/17


Time of Evaluation: 07:15





- Subjective


Subjective: 





no complaints/distres


no f/c, n/v/d


pendin gplacement





Objective





- Vital Signs/Intake and Output


Vital Signs (last 24 hours): 


 











Temp Pulse Resp BP Pulse Ox


 


 97.2 F L  97 H  20   110/77   99 


 


 05/22/17 01:57  05/22/17 01:57  05/22/17 01:57  05/22/17 01:57  05/22/17 01:57











- Medications


Medications: 


 Current Medications





Aspirin (Ecotrin)  81 mg PO DAILY [facility]


   Last Admin: 05/21/17 10:10 Dose:  81 mg


Atorvastatin Calcium (Lipitor)  20 mg PO HS [facility]


   Last Admin: 05/21/17 22:06 Dose:  20 mg


Divalproex Sodium (Depakote Dr(*Bid*))  500 mg PO BID [facility]


   Last Admin: 05/21/17 18:14 Dose:  500 mg


Enalapril Maleate (Vasotec)  10 mg PO DAILY [facility]


   Last Admin: 05/21/17 10:11 Dose:  10 mg


Famotidine (Pepcid)  20 mg PO BID [facility]


   Last Admin: 05/21/17 18:07 Dose:  20 mg


Fenofibrate (Tricor)  145 mg PO DAILY [facility]


   Last Admin: 05/21/17 10:10 Dose:  145 mg


Metoprolol Tartrate (Lopressor)  50 mg PO Q12 [facility]


   Last Admin: 05/21/17 22:00 Dose:  50 mg


Quetiapine Fumarate (Seroquel)  25 mg PO HS [facility]


   Last Admin: 05/21/17 22:06 Dose:  25 mg











- Labs


Labs: 


 





 03/23/17 05:45 





 03/23/17 05:45 





 











PT  11.2 SECONDS (9.4-12.0)   12/14/15  05:20    


 


INR  1.05  (0.89-1.20)   12/14/15  05:20    


 


APTT  36.2 SECONDS (23.1-32.0)  H  12/14/15  05:20    














- Constitutional


Appears: Well, Non-toxic, No Acute Distress





- Head Exam


Head Exam: ATRAUMATIC, NORMAL INSPECTION, NORMOCEPHALIC





- Eye Exam


Eye Exam: EOMI, Normal appearance, PERRL


Pupil Exam: NORMAL ACCOMODATION, PERRL





- ENT Exam


ENT Exam: Mucous Membranes Moist, Normal Exam





- Neck Exam


Neck Exam: Full ROM, Normal Inspection.  absent: Lymphadenopathy





- Respiratory Exam


Respiratory Exam: Clear to Ausculation Bilateral, NORMAL BREATHING PATTERN





- Cardiovascular Exam


Cardiovascular Exam: REGULAR RHYTHM, RRR, +S1, +S2.  absent: Murmur





- GI/Abdominal Exam


GI & Abdominal Exam: Soft, Normal Bowel Sounds.  absent: Tenderness





- Extremities Exam


Extremities Exam: Full ROM, Normal Capillary Refill, Normal Inspection.  absent

: Joint Swelling, Pedal Edema





- Back Exam


Back Exam: NORMAL INSPECTION





- Neurological Exam


Neurological Exam: Alert, Awake, CN II-XII Intact, Normal Gait, Oriented x3





- Psychiatric Exam


Psychiatric exam: Normal Affect, Normal Mood





- Skin


Skin Exam: Dry, Intact, Normal Color, Warm





Assessment and Plan


(1) DVT prophylaxis


Status: Chronic   





(2) Scrotal edema


Status: Chronic   





(3) CAD (coronary artery disease)


Status: Chronic   





(4) Smoking


Status: Chronic   





(5) Low back pain


Status: Chronic   





(6) Prediabetes


Status: Chronic   





(7) Neurocognitive disorder


Status: Chronic   





- Assessment and Plan (Free Text)


Assessment: 





cont meds, safety, placement

## 2017-05-23 RX ADMIN — DIVALPROEX SODIUM SCH MG: 250 TABLET, DELAYED RELEASE ORAL at 16:51

## 2017-05-23 RX ADMIN — DIVALPROEX SODIUM SCH MG: 250 TABLET, DELAYED RELEASE ORAL at 10:02

## 2017-05-23 NOTE — CP.PCM.PN
Subjective





- Date & Time of Evaluation


Date of Evaluation: 05/23/17


Time of Evaluation: 08:16





- Subjective


Subjective: 





denies complaints. no distress


no f/c, n/vd


pending palcement





Objective





- Vital Signs/Intake and Output


Vital Signs (last 24 hours): 


 











Temp Pulse Resp BP Pulse Ox


 


 97.5 F L  65   18   103/67   96 


 


 05/23/17 07:59  05/23/17 07:59  05/23/17 07:59  05/23/17 07:59  05/23/17 07:59











- Medications


Medications: 


 Current Medications





Aspirin (Ecotrin)  81 mg PO DAILY [facility]


   Last Admin: 05/22/17 09:25 Dose:  81 mg


Atorvastatin Calcium (Lipitor)  20 mg PO HS [facility]


   Last Admin: 05/22/17 21:05 Dose:  20 mg


Divalproex Sodium (Depakote Dr(*Bid*))  500 mg PO BID [facility]


   Last Admin: 05/22/17 18:27 Dose:  500 mg


Enalapril Maleate (Vasotec)  10 mg PO DAILY [facility]


   Last Admin: 05/22/17 09:26 Dose:  10 mg


Famotidine (Pepcid)  20 mg PO BID [facility]


   Last Admin: 05/22/17 18:27 Dose:  20 mg


Fenofibrate (Tricor)  145 mg PO DAILY [facility]


   Last Admin: 05/22/17 09:26 Dose:  145 mg


Metoprolol Tartrate (Lopressor)  50 mg PO Q12 [facility]


   Last Admin: 05/22/17 21:05 Dose:  50 mg


Quetiapine Fumarate (Seroquel)  25 mg PO HS [facility]


   Last Admin: 05/22/17 21:05 Dose:  25 mg











- Labs


Labs: 


 





 03/23/17 05:45 





 03/23/17 05:45 





 











PT  11.2 SECONDS (9.4-12.0)   12/14/15  05:20    


 


INR  1.05  (0.89-1.20)   12/14/15  05:20    


 


APTT  36.2 SECONDS (23.1-32.0)  H  12/14/15  05:20    














- Constitutional


Appears: Well, Non-toxic, No Acute Distress





- Head Exam


Head Exam: ATRAUMATIC, NORMAL INSPECTION, NORMOCEPHALIC





- Eye Exam


Eye Exam: EOMI, Normal appearance, PERRL


Pupil Exam: NORMAL ACCOMODATION, PERRL





- ENT Exam


ENT Exam: Mucous Membranes Moist, Normal Exam





- Neck Exam


Neck Exam: Full ROM, Normal Inspection.  absent: Lymphadenopathy





- Respiratory Exam


Respiratory Exam: Clear to Ausculation Bilateral, NORMAL BREATHING PATTERN





- Cardiovascular Exam


Cardiovascular Exam: REGULAR RHYTHM, RRR, +S1, +S2.  absent: Murmur





- GI/Abdominal Exam


GI & Abdominal Exam: Soft, Normal Bowel Sounds.  absent: Tenderness





- Extremities Exam


Extremities Exam: Full ROM, Normal Capillary Refill, Normal Inspection.  absent

: Joint Swelling, Pedal Edema





- Back Exam


Back Exam: NORMAL INSPECTION





- Neurological Exam


Neurological Exam: Alert, Awake, CN II-XII Intact, Normal Gait, Oriented x3





- Psychiatric Exam


Psychiatric exam: Normal Affect, Normal Mood





- Skin


Skin Exam: Dry, Intact, Normal Color, Warm





Assessment and Plan


(1) DVT prophylaxis


Status: Chronic   





(2) Scrotal edema


Status: Chronic   





(3) CAD (coronary artery disease)


Status: Chronic   





(4) Smoking


Status: Chronic   





(5) Low back pain


Status: Chronic   





(6) Prediabetes


Status: Chronic   





(7) Neurocognitive disorder


Status: Chronic   





- Assessment and Plan (Free Text)


Assessment: 





cont meds, diet nad exercise


cont safety, placemet

## 2017-05-24 RX ADMIN — DIVALPROEX SODIUM SCH MG: 250 TABLET, DELAYED RELEASE ORAL at 08:32

## 2017-05-24 RX ADMIN — DIVALPROEX SODIUM SCH MG: 250 TABLET, DELAYED RELEASE ORAL at 16:34

## 2017-05-24 NOTE — CP.PCM.PN
Subjective





- Date & Time of Evaluation


Date of Evaluation: 05/24/17


Time of Evaluation: 07:29





- Subjective


Subjective: 





no complaints/distress


no f/c, nv//d


pending placement





Objective





- Vital Signs/Intake and Output


Vital Signs (last 24 hours): 


 











Temp Pulse Resp BP Pulse Ox


 


 98.1 F   71   19   100/63   95 


 


 05/24/17 00:14  05/24/17 00:14  05/24/17 00:14  05/24/17 00:14  05/24/17 00:14











- Medications


Medications: 


 Current Medications





Aspirin (Ecotrin)  81 mg PO DAILY [facility]


   Last Admin: 05/23/17 10:02 Dose:  81 mg


Atorvastatin Calcium (Lipitor)  20 mg PO HS [facility]


   Last Admin: 05/23/17 21:00 Dose:  20 mg


Divalproex Sodium (Depakote Dr(*Bid*))  500 mg PO BID [facility]


   Last Admin: 05/23/17 16:51 Dose:  500 mg


Enalapril Maleate (Vasotec)  10 mg PO DAILY [facility]


   Last Admin: 05/23/17 10:02 Dose:  10 mg


Famotidine (Pepcid)  20 mg PO BID [facility]


   Last Admin: 05/23/17 16:51 Dose:  20 mg


Fenofibrate (Tricor)  145 mg PO DAILY [facility]


   Last Admin: 05/23/17 10:01 Dose:  145 mg


Metoprolol Tartrate (Lopressor)  50 mg PO Q12 [facility]


   Last Admin: 05/23/17 21:00 Dose:  50 mg


Quetiapine Fumarate (Seroquel)  25 mg PO HS [facility]


   Last Admin: 05/23/17 21:00 Dose:  25 mg











- Labs


Labs: 


 





 03/23/17 05:45 





 03/23/17 05:45 





 











PT  11.2 SECONDS (9.4-12.0)   12/14/15  05:20    


 


INR  1.05  (0.89-1.20)   12/14/15  05:20    


 


APTT  36.2 SECONDS (23.1-32.0)  H  12/14/15  05:20    














- Constitutional


Appears: Well, Non-toxic, No Acute Distress





- Head Exam


Head Exam: ATRAUMATIC, NORMAL INSPECTION, NORMOCEPHALIC





- Eye Exam


Eye Exam: EOMI, Normal appearance, PERRL


Pupil Exam: NORMAL ACCOMODATION, PERRL





- ENT Exam


ENT Exam: Mucous Membranes Moist, Normal Exam





- Neck Exam


Neck Exam: Full ROM, Normal Inspection.  absent: Lymphadenopathy





- Respiratory Exam


Respiratory Exam: Clear to Ausculation Bilateral, NORMAL BREATHING PATTERN





- Cardiovascular Exam


Cardiovascular Exam: REGULAR RHYTHM, RRR, +S1, +S2.  absent: Murmur





- GI/Abdominal Exam


GI & Abdominal Exam: Soft, Normal Bowel Sounds.  absent: Tenderness





- Extremities Exam


Extremities Exam: Full ROM, Normal Capillary Refill, Normal Inspection.  absent

: Joint Swelling, Pedal Edema





- Back Exam


Back Exam: NORMAL INSPECTION





- Neurological Exam


Neurological Exam: Alert, Awake, CN II-XII Intact, Normal Gait, Oriented x3





- Psychiatric Exam


Psychiatric exam: Normal Affect, Normal Mood





- Skin


Skin Exam: Dry, Intact, Normal Color, Warm





Assessment and Plan


(1) DVT prophylaxis


Status: Chronic   





(2) Scrotal edema


Status: Chronic   





(3) CAD (coronary artery disease)


Status: Chronic   





(4) Smoking


Status: Chronic   





(5) Low back pain


Status: Chronic   





(6) Prediabetes


Status: Chronic   





(7) Neurocognitive disorder


Status: Chronic   





- Assessment and Plan (Free Text)


Assessment: 





cont placement, meds, safety

## 2017-05-25 RX ADMIN — DIVALPROEX SODIUM SCH MG: 250 TABLET, DELAYED RELEASE ORAL at 08:51

## 2017-05-25 RX ADMIN — DIVALPROEX SODIUM SCH MG: 250 TABLET, DELAYED RELEASE ORAL at 17:23

## 2017-05-25 NOTE — CP.PCM.PN
Subjective





- Date & Time of Evaluation


Date of Evaluation: 05/25/17


Time of Evaluation: 08:20





- Subjective


Subjective: 





no complaints/distress


nof /c, n/v/d.


pending placement





Objective





- Vital Signs/Intake and Output


Vital Signs (last 24 hours): 


 











Temp Pulse Resp BP Pulse Ox


 


 97 F L  60   18   110/75   97 


 


 05/25/17 08:16  05/25/17 08:16  05/25/17 08:16  05/25/17 08:16  05/25/17 08:16











- Medications


Medications: 


 Current Medications





Aspirin (Ecotrin)  81 mg PO DAILY [facility]


   Last Admin: 05/24/17 08:33 Dose:  81 mg


Atorvastatin Calcium (Lipitor)  20 mg PO HS [facility]


   Last Admin: 05/24/17 21:19 Dose:  20 mg


Divalproex Sodium (Depakote Dr(*Bid*))  500 mg PO BID [facility]


   Last Admin: 05/24/17 16:34 Dose:  500 mg


Enalapril Maleate (Vasotec)  10 mg PO DAILY [facility]


   Last Admin: 05/24/17 08:35 Dose:  10 mg


Famotidine (Pepcid)  20 mg PO BID [facility]


   Last Admin: 05/24/17 16:35 Dose:  20 mg


Fenofibrate (Tricor)  145 mg PO DAILY [facility]


   Last Admin: 05/24/17 08:35 Dose:  145 mg


Metoprolol Tartrate (Lopressor)  50 mg PO Q12 [facility]


   Last Admin: 05/24/17 21:18 Dose:  50 mg


Quetiapine Fumarate (Seroquel)  25 mg PO HS [facility]


   Last Admin: 05/24/17 21:19 Dose:  25 mg











- Labs


Labs: 


 





 03/23/17 05:45 





 03/23/17 05:45 





 











PT  11.2 SECONDS (9.4-12.0)   12/14/15  05:20    


 


INR  1.05  (0.89-1.20)   12/14/15  05:20    


 


APTT  36.2 SECONDS (23.1-32.0)  H  12/14/15  05:20    














- Constitutional


Appears: Well, Non-toxic, No Acute Distress





- Head Exam


Head Exam: ATRAUMATIC, NORMAL INSPECTION, NORMOCEPHALIC





- Eye Exam


Eye Exam: EOMI, Normal appearance, PERRL


Pupil Exam: NORMAL ACCOMODATION, PERRL





- ENT Exam


ENT Exam: Mucous Membranes Moist, Normal Exam





- Neck Exam


Neck Exam: Full ROM, Normal Inspection.  absent: Lymphadenopathy





- Respiratory Exam


Respiratory Exam: Clear to Ausculation Bilateral, NORMAL BREATHING PATTERN





- Cardiovascular Exam


Cardiovascular Exam: REGULAR RHYTHM, +S1, +S2.  absent: Murmur





- GI/Abdominal Exam


GI & Abdominal Exam: Soft, Normal Bowel Sounds.  absent: Tenderness





- Extremities Exam


Extremities Exam: Full ROM, Normal Capillary Refill, Normal Inspection.  absent

: Joint Swelling, Pedal Edema





- Back Exam


Back Exam: NORMAL INSPECTION





- Neurological Exam


Neurological Exam: Alert, Awake, CN II-XII Intact, Normal Gait, Oriented x3





- Psychiatric Exam


Psychiatric exam: Normal Affect, Normal Mood





- Skin


Skin Exam: Dry, Intact, Normal Color, Warm





Assessment and Plan


(1) DVT prophylaxis


Status: Chronic   





(2) Scrotal edema


Status: Chronic   





(3) CAD (coronary artery disease)


Status: Chronic   





(4) Smoking


Status: Chronic   





(5) Low back pain


Status: Chronic   





(6) Prediabetes


Status: Chronic   





(7) Neurocognitive disorder


Status: Chronic   





- Assessment and Plan (Free Text)


Assessment: 





cont meds, safety, placement

## 2017-05-26 RX ADMIN — DIVALPROEX SODIUM SCH MG: 250 TABLET, DELAYED RELEASE ORAL at 10:19

## 2017-05-26 RX ADMIN — DIVALPROEX SODIUM SCH MG: 250 TABLET, DELAYED RELEASE ORAL at 17:50

## 2017-05-27 RX ADMIN — DIVALPROEX SODIUM SCH MG: 250 TABLET, DELAYED RELEASE ORAL at 16:26

## 2017-05-27 RX ADMIN — DIVALPROEX SODIUM SCH MG: 250 TABLET, DELAYED RELEASE ORAL at 09:47

## 2017-05-27 NOTE — CP.PCM.PN
Subjective





- Date & Time of Evaluation


Date of Evaluation: 05/26/17


Time of Evaluation: 07:37





- Subjective


Subjective: 





S/O


no complaints/distress. nof /c, n/v/d. penidng placement.


A/P


cont current plan





Objective





- Vital Signs/Intake and Output


Vital Signs (last 24 hours): 


 











Temp Pulse Resp BP Pulse Ox


 


 97.7 F   78   87 H  110/76   20 L


 


 05/27/17 00:44  05/26/17 21:28  05/27/17 00:44  05/27/17 00:44  05/27/17 00:44











- Medications


Medications: 


 Current Medications





Aspirin (Ecotrin)  81 mg PO DAILY [facility]


   Last Admin: 05/26/17 10:16 Dose:  81 mg


Atorvastatin Calcium (Lipitor)  20 mg PO HS [facility]


   Last Admin: 05/26/17 21:26 Dose:  20 mg


Divalproex Sodium (Depakote Dr(*Bid*))  500 mg PO BID [facility]


   Last Admin: 05/26/17 17:50 Dose:  500 mg


Enalapril Maleate (Vasotec)  10 mg PO DAILY [facility]


   Last Admin: 05/26/17 10:17 Dose:  10 mg


Famotidine (Pepcid)  20 mg PO BID [facility]


   Last Admin: 05/26/17 17:50 Dose:  20 mg


Fenofibrate (Tricor)  145 mg PO DAILY [facility]


   Last Admin: 05/26/17 10:17 Dose:  145 mg


Metoprolol Tartrate (Lopressor)  50 mg PO Q12 [facility]


   Last Admin: 05/26/17 21:28 Dose:  Not Given


Quetiapine Fumarate (Seroquel)  25 mg PO HS [facility]


   Last Admin: 05/26/17 21:26 Dose:  25 mg











- Labs


Labs: 


 





 03/23/17 05:45 





 03/23/17 05:45 





 











PT  11.2 SECONDS (9.4-12.0)   12/14/15  05:20    


 


INR  1.05  (0.89-1.20)   12/14/15  05:20    


 


APTT  36.2 SECONDS (23.1-32.0)  H  12/14/15  05:20    














- Constitutional


Appears: Well, Non-toxic, No Acute Distress





- Head Exam


Head Exam: ATRAUMATIC, NORMAL INSPECTION, NORMOCEPHALIC





- Eye Exam


Eye Exam: EOMI, Normal appearance, PERRL


Pupil Exam: NORMAL ACCOMODATION, PERRL





- ENT Exam


ENT Exam: Mucous Membranes Moist, Normal Exam





- Neck Exam


Neck Exam: Full ROM, Normal Inspection.  absent: Lymphadenopathy





- Respiratory Exam


Respiratory Exam: Clear to Ausculation Bilateral, NORMAL BREATHING PATTERN





- Cardiovascular Exam


Cardiovascular Exam: REGULAR RHYTHM, +S1, +S2.  absent: Murmur





- GI/Abdominal Exam


GI & Abdominal Exam: Soft, Normal Bowel Sounds.  absent: Tenderness





- Extremities Exam


Extremities Exam: Full ROM, Normal Capillary Refill, Normal Inspection.  absent

: Joint Swelling, Pedal Edema





- Back Exam


Back Exam: NORMAL INSPECTION





- Neurological Exam


Neurological Exam: Alert, Awake, CN II-XII Intact, Normal Gait, Oriented x3





- Psychiatric Exam


Psychiatric exam: Normal Affect, Normal Mood





- Skin


Skin Exam: Dry, Intact, Normal Color, Warm





Assessment and Plan


(1) DVT prophylaxis


Status: Chronic   





(2) Scrotal edema


Status: Chronic   





(3) CAD (coronary artery disease)


Status: Chronic   





(4) Smoking


Status: Chronic   





(5) Low back pain


Status: Chronic   





(6) Prediabetes


Status: Chronic   





(7) Neurocognitive disorder


Status: Chronic

## 2017-05-27 NOTE — CP.PCM.PN
Subjective





- Date & Time of Evaluation


Date of Evaluation: 05/27/17


Time of Evaluation: 07:36





- Subjective


Subjective: 





S/O


no complaints/distress. nof /c, n/v/d. penidng placement.


A/P


cont current plan





Objective





- Vital Signs/Intake and Output


Vital Signs (last 24 hours): 


 











Temp Pulse Resp BP Pulse Ox


 


 97.7 F   78   87 H  110/76   20 L


 


 05/27/17 00:44  05/26/17 21:28  05/27/17 00:44  05/27/17 00:44  05/27/17 00:44











- Medications


Medications: 


 Current Medications





Aspirin (Ecotrin)  81 mg PO DAILY [facility]


   Last Admin: 05/26/17 10:16 Dose:  81 mg


Atorvastatin Calcium (Lipitor)  20 mg PO HS [facility]


   Last Admin: 05/26/17 21:26 Dose:  20 mg


Divalproex Sodium (Depakote Dr(*Bid*))  500 mg PO BID [facility]


   Last Admin: 05/26/17 17:50 Dose:  500 mg


Enalapril Maleate (Vasotec)  10 mg PO DAILY [facility]


   Last Admin: 05/26/17 10:17 Dose:  10 mg


Famotidine (Pepcid)  20 mg PO BID [facility]


   Last Admin: 05/26/17 17:50 Dose:  20 mg


Fenofibrate (Tricor)  145 mg PO DAILY [facility]


   Last Admin: 05/26/17 10:17 Dose:  145 mg


Metoprolol Tartrate (Lopressor)  50 mg PO Q12 [facility]


   Last Admin: 05/26/17 21:28 Dose:  Not Given


Quetiapine Fumarate (Seroquel)  25 mg PO HS [facility]


   Last Admin: 05/26/17 21:26 Dose:  25 mg











- Labs


Labs: 


 





 03/23/17 05:45 





 03/23/17 05:45 





 











PT  11.2 SECONDS (9.4-12.0)   12/14/15  05:20    


 


INR  1.05  (0.89-1.20)   12/14/15  05:20    


 


APTT  36.2 SECONDS (23.1-32.0)  H  12/14/15  05:20    














- Constitutional


Appears: Well, Non-toxic, No Acute Distress





- Head Exam


Head Exam: ATRAUMATIC, NORMAL INSPECTION, NORMOCEPHALIC





- Eye Exam


Eye Exam: EOMI, Normal appearance, PERRL


Pupil Exam: NORMAL ACCOMODATION, PERRL





- ENT Exam


ENT Exam: Mucous Membranes Moist, Normal Exam





- Neck Exam


Neck Exam: Full ROM, Normal Inspection.  absent: Lymphadenopathy





- Respiratory Exam


Respiratory Exam: Clear to Ausculation Bilateral, NORMAL BREATHING PATTERN





- Cardiovascular Exam


Cardiovascular Exam: REGULAR RHYTHM, RRR, +S1, +S2.  absent: Murmur





- GI/Abdominal Exam


GI & Abdominal Exam: Soft, Normal Bowel Sounds.  absent: Tenderness





- Extremities Exam


Extremities Exam: Full ROM, Normal Capillary Refill, Normal Inspection.  absent

: Joint Swelling, Pedal Edema





- Back Exam


Back Exam: NORMAL INSPECTION





- Neurological Exam


Neurological Exam: Alert, Awake, CN II-XII Intact, Normal Gait, Oriented x3





- Psychiatric Exam


Psychiatric exam: Normal Affect, Normal Mood





- Skin


Skin Exam: Dry, Intact, Normal Color, Warm





Assessment and Plan


(1) DVT prophylaxis


Status: Chronic   





(2) Scrotal edema


Status: Chronic   





(3) CAD (coronary artery disease)


Status: Chronic   





(4) Smoking


Status: Chronic   





(5) Low back pain


Status: Chronic   





(6) Prediabetes


Status: Chronic   





(7) Neurocognitive disorder


Status: Chronic

## 2017-05-28 RX ADMIN — DIVALPROEX SODIUM SCH MG: 250 TABLET, DELAYED RELEASE ORAL at 09:11

## 2017-05-28 RX ADMIN — DIVALPROEX SODIUM SCH MG: 250 TABLET, DELAYED RELEASE ORAL at 17:44

## 2017-05-28 NOTE — CP.PCM.PN
Subjective





- Date & Time of Evaluation


Date of Evaluation: 05/28/17


Time of Evaluation: 09:39





- Subjective


Subjective: 





no complaints/distress


no f/c n/v/d.


pending palcement





Objective





- Vital Signs/Intake and Output


Vital Signs (last 24 hours): 


 











Temp Pulse Resp BP Pulse Ox


 


 97.6 F   76   20   106/68   97 


 


 05/28/17 07:57  05/28/17 09:12  05/28/17 07:57  05/28/17 09:12  05/28/17 07:57











- Medications


Medications: 


 Current Medications





Aspirin (Ecotrin)  81 mg PO DAILY [facility]


   Last Admin: 05/28/17 09:11 Dose:  81 mg


Atorvastatin Calcium (Lipitor)  20 mg PO HS [facility]


   Last Admin: 05/27/17 20:59 Dose:  20 mg


Divalproex Sodium (Depakote Dr(*Bid*))  500 mg PO BID [facility]


   Last Admin: 05/28/17 09:11 Dose:  500 mg


Enalapril Maleate (Vasotec)  10 mg PO DAILY [facility]


   Last Admin: 05/27/17 09:49 Dose:  Not Given


Famotidine (Pepcid)  20 mg PO BID [facility]


   Last Admin: 05/28/17 09:12 Dose:  20 mg


Fenofibrate (Tricor)  145 mg PO DAILY [facility]


   Last Admin: 05/28/17 09:12 Dose:  145 mg


Metoprolol Tartrate (Lopressor)  50 mg PO Q12 [facility]


   Last Admin: 05/28/17 09:12 Dose:  50 mg


Quetiapine Fumarate (Seroquel)  25 mg PO HS [facility]


   Last Admin: 05/27/17 20:59 Dose:  25 mg











- Labs


Labs: 


 





 03/23/17 05:45 





 03/23/17 05:45 





 











PT  11.2 SECONDS (9.4-12.0)   12/14/15  05:20    


 


INR  1.05  (0.89-1.20)   12/14/15  05:20    


 


APTT  36.2 SECONDS (23.1-32.0)  H  12/14/15  05:20    














- Constitutional


Appears: Well, Non-toxic, No Acute Distress





- Head Exam


Head Exam: ATRAUMATIC, NORMAL INSPECTION, NORMOCEPHALIC





- Eye Exam


Eye Exam: EOMI, Normal appearance, PERRL


Pupil Exam: NORMAL ACCOMODATION, PERRL





- ENT Exam


ENT Exam: Mucous Membranes Moist, Normal Exam





- Neck Exam


Neck Exam: Full ROM, Normal Inspection.  absent: Lymphadenopathy





- Respiratory Exam


Respiratory Exam: Clear to Ausculation Bilateral, NORMAL BREATHING PATTERN





- Cardiovascular Exam


Cardiovascular Exam: REGULAR RHYTHM, RRR, +S1, +S2.  absent: Murmur





- GI/Abdominal Exam


GI & Abdominal Exam: Soft, Normal Bowel Sounds.  absent: Tenderness





- Rectal Exam


Rectal Exam: NORMAL INSPECTION





- Extremities Exam


Extremities Exam: Full ROM, Normal Capillary Refill, Normal Inspection.  absent

: Joint Swelling, Pedal Edema





- Back Exam


Back Exam: NORMAL INSPECTION





- Neurological Exam


Neurological Exam: Alert, Awake, CN II-XII Intact, Normal Gait, Oriented x3





- Psychiatric Exam


Psychiatric exam: Normal Affect, Normal Mood





- Skin


Skin Exam: Dry, Intact, Normal Color, Warm





Assessment and Plan


(1) DVT prophylaxis


Status: Chronic   





(2) Scrotal edema


Status: Chronic   





(3) CAD (coronary artery disease)


Status: Chronic   





(4) Smoking


Status: Chronic   





(5) Low back pain


Status: Chronic   





(6) Prediabetes


Status: Chronic   





(7) Neurocognitive disorder


Status: Chronic   





- Assessment and Plan (Free Text)


Assessment: 





cont all meds, safety, plan

## 2017-05-29 RX ADMIN — DIVALPROEX SODIUM SCH MG: 250 TABLET, DELAYED RELEASE ORAL at 08:36

## 2017-05-29 RX ADMIN — DIVALPROEX SODIUM SCH MG: 250 TABLET, DELAYED RELEASE ORAL at 16:54

## 2017-05-29 NOTE — CP.PCM.PN
Subjective





- Date & Time of Evaluation


Date of Evaluation: 05/29/17


Time of Evaluation: 10:37





- Subjective


Subjective: 





S/O no complaints/distress, no f/c, n/v/d. pending placement


A/P


cont current plan





Objective





- Vital Signs/Intake and Output


Vital Signs (last 24 hours): 


 











Temp Pulse Resp BP Pulse Ox


 


 97.1 F L  67   20   106/63   98 


 


 05/29/17 08:08  05/29/17 08:08  05/29/17 08:08  05/29/17 08:36  05/29/17 08:08











- Medications


Medications: 


 Current Medications





Aspirin (Ecotrin)  81 mg PO DAILY [facility]


   Last Admin: 05/28/17 09:11 Dose:  81 mg


Atorvastatin Calcium (Lipitor)  20 mg PO [facility]


   Last Admin: 05/28/17 22:33 Dose:  20 mg


Divalproex Sodium (Depakote Dr(*Bid*))  500 mg PO BID [facility]


   Last Admin: 05/29/17 08:36 Dose:  500 mg


Enalapril Maleate (Vasotec)  10 mg PO DAILY [facility]


   Last Admin: 05/28/17 17:45 Dose:  10 mg


Famotidine (Pepcid)  20 mg PO BID [facility]


   Last Admin: 05/29/17 08:36 Dose:  20 mg


Fenofibrate (Tricor)  145 mg PO DAILY [facility]


   Last Admin: 05/29/17 08:36 Dose:  145 mg


Metoprolol Tartrate (Lopressor)  50 mg PO Q12 [facility]


   Last Admin: 05/29/17 08:36 Dose:  50 mg


Quetiapine Fumarate (Seroquel)  25 mg PO HS [facility]


   Last Admin: 05/28/17 22:33 Dose:  25 mg











- Labs


Labs: 


 





 03/23/17 05:45 





 03/23/17 05:45 





 











PT  11.2 SECONDS (9.4-12.0)   12/14/15  05:20    


 


INR  1.05  (0.89-1.20)   12/14/15  05:20    


 


APTT  36.2 SECONDS (23.1-32.0)  H  12/14/15  05:20    














- Constitutional


Appears: Well, Non-toxic, No Acute Distress





- Head Exam


Head Exam: ATRAUMATIC, NORMAL INSPECTION, NORMOCEPHALIC





- Eye Exam


Eye Exam: EOMI, Normal appearance, PERRL


Pupil Exam: NORMAL ACCOMODATION, PERRL





- ENT Exam


ENT Exam: Mucous Membranes Moist, Normal Exam





- Neck Exam


Neck Exam: Full ROM, Normal Inspection.  absent: Lymphadenopathy





- Respiratory Exam


Respiratory Exam: Clear to Ausculation Bilateral, NORMAL BREATHING PATTERN





- Cardiovascular Exam


Cardiovascular Exam: REGULAR RHYTHM, RRR, +S1, +S2.  absent: Murmur





- GI/Abdominal Exam


GI & Abdominal Exam: Soft, Normal Bowel Sounds.  absent: Tenderness





- Extremities Exam


Extremities Exam: Full ROM, Normal Capillary Refill, Normal Inspection.  absent

: Joint Swelling, Pedal Edema





- Back Exam


Back Exam: NORMAL INSPECTION





- Neurological Exam


Neurological Exam: Alert, Awake, CN II-XII Intact, Normal Gait, Oriented x3





- Psychiatric Exam


Psychiatric exam: Normal Affect, Normal Mood





- Skin


Skin Exam: Dry, Intact, Normal Color, Warm





Assessment and Plan


(1) DVT prophylaxis


Status: Chronic   





(2) Scrotal edema


Status: Chronic   





(3) CAD (coronary artery disease)


Status: Chronic   





(4) Smoking


Status: Chronic   





(5) Low back pain


Status: Chronic   





(6) Prediabetes


Status: Chronic   





(7) Neurocognitive disorder


Status: Chronic

## 2017-05-30 RX ADMIN — DIVALPROEX SODIUM SCH MG: 250 TABLET, DELAYED RELEASE ORAL at 17:35

## 2017-05-30 RX ADMIN — DIVALPROEX SODIUM SCH MG: 250 TABLET, DELAYED RELEASE ORAL at 09:19

## 2017-05-30 NOTE — CP.PCM.PN
Subjective





- Date & Time of Evaluation


Date of Evaluation: 05/30/17


Time of Evaluation: 07:19





- Subjective


Subjective: 





S/O no complaitns/distress, nof /c, n/v/d, pending placement


A/P pending placement, cont meds, diuet nad exercise





Objective





- Vital Signs/Intake and Output


Vital Signs (last 24 hours): 


 











Temp Pulse Resp BP Pulse Ox


 


 98.4 F   72   18   95/59 L  97 


 


 05/30/17 00:23  05/30/17 00:23  05/30/17 00:23  05/30/17 00:23  05/30/17 00:23











- Medications


Medications: 


 Current Medications





Aspirin (Ecotrin)  81 mg PO DAILY [facility]


   Last Admin: 05/29/17 08:35 Dose:  81 mg


Atorvastatin Calcium (Lipitor)  20 mg PO [facility]


   Last Admin: 05/29/17 22:11 Dose:  20 mg


Divalproex Sodium (Depakote Dr(*Bid*))  500 mg PO BID [facility]


   Last Admin: 05/29/17 16:54 Dose:  500 mg


Enalapril Maleate (Vasotec)  10 mg PO DAILY [facility]


   Last Admin: 05/29/17 17:49 Dose:  Not Given


Famotidine (Pepcid)  20 mg PO BID [facility]


   Last Admin: 05/29/17 16:54 Dose:  20 mg


Fenofibrate (Tricor)  145 mg PO DAILY [facility]


   Last Admin: 05/29/17 08:36 Dose:  145 mg


Metoprolol Tartrate (Lopressor)  50 mg PO Q12 [facility]


   Last Admin: 05/29/17 22:10 Dose:  50 mg


Quetiapine Fumarate (Seroquel)  25 mg PO HS [facility]


   Last Admin: 05/29/17 22:11 Dose:  25 mg











- Labs


Labs: 


 





 03/23/17 05:45 





 03/23/17 05:45 





 











PT  11.2 SECONDS (9.4-12.0)   12/14/15  05:20    


 


INR  1.05  (0.89-1.20)   12/14/15  05:20    


 


APTT  36.2 SECONDS (23.1-32.0)  H  12/14/15  05:20    














- Constitutional


Appears: Well, Non-toxic, No Acute Distress





- Head Exam


Head Exam: ATRAUMATIC, NORMAL INSPECTION, NORMOCEPHALIC





- Eye Exam


Eye Exam: EOMI, Normal appearance, PERRL


Pupil Exam: NORMAL ACCOMODATION, PERRL





- ENT Exam


ENT Exam: Mucous Membranes Moist, Normal Exam





- Neck Exam


Neck Exam: Full ROM, Normal Inspection.  absent: Lymphadenopathy





- Respiratory Exam


Respiratory Exam: Clear to Ausculation Bilateral, NORMAL BREATHING PATTERN





- Cardiovascular Exam


Cardiovascular Exam: REGULAR RHYTHM, +S1, +S2.  absent: Murmur





- GI/Abdominal Exam


GI & Abdominal Exam: Soft, Normal Bowel Sounds.  absent: Tenderness





- Extremities Exam


Extremities Exam: Full ROM, Normal Capillary Refill, Normal Inspection.  absent

: Joint Swelling, Pedal Edema





- Back Exam


Back Exam: NORMAL INSPECTION





- Neurological Exam


Neurological Exam: Alert, Awake, CN II-XII Intact, Normal Gait, Oriented x3





- Psychiatric Exam


Psychiatric exam: Normal Affect, Normal Mood





- Skin


Skin Exam: Dry, Intact, Normal Color, Warm





Assessment and Plan


(1) DVT prophylaxis


Status: Chronic   





(2) Scrotal edema


Status: Chronic   





(3) CAD (coronary artery disease)


Status: Chronic   





(4) Smoking


Status: Chronic   





(5) Low back pain


Status: Chronic   





(6) Prediabetes


Status: Chronic   





(7) Neurocognitive disorder


Status: Chronic

## 2017-05-31 RX ADMIN — DIVALPROEX SODIUM SCH MG: 250 TABLET, DELAYED RELEASE ORAL at 08:45

## 2017-05-31 RX ADMIN — DIVALPROEX SODIUM SCH MG: 250 TABLET, DELAYED RELEASE ORAL at 16:51

## 2017-05-31 NOTE — CP.PCM.PN
Subjective





- Date & Time of Evaluation


Date of Evaluation: 05/31/17


Time of Evaluation: 07:14





- Subjective


Subjective: 





S/O no complaitns/distress, nof /c, n/v/d, pending placement


A/P pending placement, cont meds, diuet nad exercise








Objective





- Vital Signs/Intake and Output


Vital Signs (last 24 hours): 


 











Temp Pulse Resp BP Pulse Ox


 


 97.8 F   88   20   110/78   99 


 


 05/31/17 00:55  05/31/17 00:55  05/31/17 00:55  05/31/17 00:55  05/31/17 00:55











- Medications


Medications: 


 Current Medications





Aspirin (Ecotrin)  81 mg PO DAILY [facility]


   Last Admin: 05/30/17 09:19 Dose:  81 mg


Atorvastatin Calcium (Lipitor)  20 mg PO HS [facility]


   Last Admin: 05/30/17 20:59 Dose:  20 mg


Divalproex Sodium (Depakote Dr(*Bid*))  500 mg PO BID [facility]


   Last Admin: 05/30/17 17:35 Dose:  500 mg


Enalapril Maleate (Vasotec)  10 mg PO DAILY [facility]


   Last Admin: 05/30/17 09:21 Dose:  Not Given


Famotidine (Pepcid)  20 mg PO BID [facility]


   Last Admin: 05/30/17 17:35 Dose:  20 mg


Fenofibrate (Tricor)  145 mg PO DAILY [facility]


   Last Admin: 05/30/17 09:21 Dose:  145 mg


Metoprolol Tartrate (Lopressor)  50 mg PO Q12 [facility]


   Last Admin: 05/30/17 20:59 Dose:  50 mg


Quetiapine Fumarate (Seroquel)  25 mg PO HS [facility]


   Last Admin: 05/30/17 20:59 Dose:  25 mg











- Labs


Labs: 


 





 03/23/17 05:45 





 03/23/17 05:45 





 











PT  11.2 SECONDS (9.4-12.0)   12/14/15  05:20    


 


INR  1.05  (0.89-1.20)   12/14/15  05:20    


 


APTT  36.2 SECONDS (23.1-32.0)  H  12/14/15  05:20    














- Constitutional


Appears: Well, Non-toxic, No Acute Distress





- Head Exam


Head Exam: ATRAUMATIC, NORMAL INSPECTION, NORMOCEPHALIC





- Eye Exam


Eye Exam: EOMI, Normal appearance, PERRL


Pupil Exam: NORMAL ACCOMODATION, PERRL





- ENT Exam


ENT Exam: Mucous Membranes Moist, Normal Exam





- Neck Exam


Neck Exam: Full ROM, Normal Inspection.  absent: Lymphadenopathy





- Respiratory Exam


Respiratory Exam: Clear to Ausculation Bilateral, NORMAL BREATHING PATTERN





- Cardiovascular Exam


Cardiovascular Exam: REGULAR RHYTHM, RRR, +S1, +S2.  absent: Murmur





- GI/Abdominal Exam


GI & Abdominal Exam: Soft, Normal Bowel Sounds.  absent: Tenderness





- Extremities Exam


Extremities Exam: Full ROM, Normal Capillary Refill, Normal Inspection.  absent

: Joint Swelling, Pedal Edema





- Back Exam


Back Exam: NORMAL INSPECTION





- Neurological Exam


Neurological Exam: Alert, Awake, CN II-XII Intact, Normal Gait, Oriented x3





- Psychiatric Exam


Psychiatric exam: Normal Affect, Normal Mood





- Skin


Skin Exam: Dry, Intact, Normal Color, Warm





Assessment and Plan


(1) DVT prophylaxis


Status: Chronic   





(2) Scrotal edema


Status: Chronic   





(3) CAD (coronary artery disease)


Status: Chronic   





(4) Smoking


Status: Chronic   





(5) Low back pain


Status: Chronic   





(6) Prediabetes


Status: Chronic   





(7) Neurocognitive disorder


Status: Chronic

## 2017-06-01 RX ADMIN — DIVALPROEX SODIUM SCH MG: 250 TABLET, DELAYED RELEASE ORAL at 09:00

## 2017-06-01 RX ADMIN — DIVALPROEX SODIUM SCH MG: 250 TABLET, DELAYED RELEASE ORAL at 17:17

## 2017-06-01 NOTE — CP.PCM.PN
Subjective





- Date & Time of Evaluation


Date of Evaluation: 06/01/17


Time of Evaluation: 08:22





- Subjective


Subjective: 





S/O no complaitns/distress, nof /c, n/v/d, pending placement


A/P pending placement, cont meds, diuet nad exercise








Objective





- Vital Signs/Intake and Output


Vital Signs (last 24 hours): 


 











Temp Pulse Resp BP Pulse Ox


 


 97.8 F   64   20   104/64   96 


 


 06/01/17 07:37  06/01/17 07:37  06/01/17 07:37  06/01/17 07:37  06/01/17 07:37











- Medications


Medications: 


 Current Medications





Aspirin (Ecotrin)  81 mg PO DAILY [facility]


   Last Admin: 05/31/17 08:45 Dose:  81 mg


Atorvastatin Calcium (Lipitor)  20 mg PO [facility]


   Last Admin: 05/31/17 21:08 Dose:  20 mg


Divalproex Sodium (Depakote Dr(*Bid*))  500 mg PO BID [facility]


   Last Admin: 05/31/17 16:51 Dose:  500 mg


Enalapril Maleate (Vasotec)  10 mg PO DAILY [facility]


   Last Admin: 05/31/17 08:46 Dose:  10 mg


Famotidine (Pepcid)  20 mg PO BID [facility]


   Last Admin: 05/31/17 16:51 Dose:  20 mg


Fenofibrate (Tricor)  145 mg PO DAILY [facility]


   Last Admin: 05/31/17 08:46 Dose:  145 mg


Metoprolol Tartrate (Lopressor)  50 mg PO Q12 [facility]


   Last Admin: 05/31/17 21:08 Dose:  Not Given


Quetiapine Fumarate (Seroquel)  25 mg PO HS [facility]


   Last Admin: 05/31/17 21:08 Dose:  25 mg











- Labs


Labs: 


 





 03/23/17 05:45 





 03/23/17 05:45 





 











PT  11.2 SECONDS (9.4-12.0)   12/14/15  05:20    


 


INR  1.05  (0.89-1.20)   12/14/15  05:20    


 


APTT  36.2 SECONDS (23.1-32.0)  H  12/14/15  05:20    














- Constitutional


Appears: Well, Non-toxic, No Acute Distress





- Head Exam


Head Exam: ATRAUMATIC, NORMAL INSPECTION, NORMOCEPHALIC





- Eye Exam


Eye Exam: EOMI, Normal appearance, PERRL


Pupil Exam: NORMAL ACCOMODATION, PERRL





- ENT Exam


ENT Exam: Mucous Membranes Moist, Normal Exam





- Neck Exam


Neck Exam: Full ROM, Normal Inspection.  absent: Lymphadenopathy





- Respiratory Exam


Respiratory Exam: Clear to Ausculation Bilateral, NORMAL BREATHING PATTERN





- Cardiovascular Exam


Cardiovascular Exam: REGULAR RHYTHM, RRR, +S1, +S2.  absent: Murmur





- GI/Abdominal Exam


GI & Abdominal Exam: Soft, Normal Bowel Sounds.  absent: Tenderness





- Extremities Exam


Extremities Exam: Full ROM, Normal Capillary Refill, Normal Inspection.  absent

: Joint Swelling, Pedal Edema





- Back Exam


Back Exam: NORMAL INSPECTION





- Neurological Exam


Neurological Exam: Alert, Awake, CN II-XII Intact, Normal Gait, Oriented x3





- Psychiatric Exam


Psychiatric exam: Normal Affect, Normal Mood





- Skin


Skin Exam: Dry, Intact, Normal Color, Warm





Assessment and Plan


(1) DVT prophylaxis


Status: Chronic   





(2) Scrotal edema


Status: Chronic   





(3) CAD (coronary artery disease)


Status: Chronic   





(4) Smoking


Status: Chronic   





(5) Low back pain


Status: Chronic   





(6) Prediabetes


Status: Chronic   





(7) Neurocognitive disorder


Status: Chronic

## 2017-06-02 RX ADMIN — DIVALPROEX SODIUM SCH MG: 250 TABLET, DELAYED RELEASE ORAL at 16:09

## 2017-06-02 RX ADMIN — DIVALPROEX SODIUM SCH MG: 250 TABLET, DELAYED RELEASE ORAL at 09:40

## 2017-06-02 NOTE — CP.PCM.PN
Subjective





- Date & Time of Evaluation


Date of Evaluation: 06/02/17


Time of Evaluation: 07:27





- Subjective


Subjective: 





S/O no complaitns/distress, nof /c, n/v/d, pending placement


A/P pending placement, cont meds, diuet nad exercise





Objective





- Vital Signs/Intake and Output


Vital Signs (last 24 hours): 


 











Temp Pulse Resp BP Pulse Ox


 


 97.2 F L  79   20   110/89   99 


 


 06/02/17 00:27  06/02/17 00:27  06/02/17 00:27  06/02/17 00:27  06/02/17 00:27











- Medications


Medications: 


 Current Medications





Aspirin (Ecotrin)  81 mg PO DAILY [facility]


   Last Admin: 06/01/17 10:33 Dose:  81 mg


Atorvastatin Calcium (Lipitor)  20 mg PO [facility]


   Last Admin: 06/01/17 21:59 Dose:  20 mg


Divalproex Sodium (Depakote Dr(*Bid*))  500 mg PO BID [facility]


   Last Admin: 06/01/17 17:17 Dose:  500 mg


Enalapril Maleate (Vasotec)  10 mg PO DAILY [facility]


   Last Admin: 06/01/17 10:35 Dose:  10 mg


Famotidine (Pepcid)  20 mg PO BID [facility]


   Last Admin: 06/01/17 17:17 Dose:  20 mg


Fenofibrate (Tricor)  145 mg PO DAILY [facility]


   Last Admin: 06/01/17 10:33 Dose:  145 mg


Metoprolol Tartrate (Lopressor)  50 mg PO Q12 [facility]


   Last Admin: 06/01/17 21:59 Dose:  50 mg


Quetiapine Fumarate (Seroquel)  25 mg PO HS [facility]


   Last Admin: 06/01/17 21:59 Dose:  25 mg











- Labs


Labs: 


 





 03/23/17 05:45 





 03/23/17 05:45 





 











PT  11.2 SECONDS (9.4-12.0)   12/14/15  05:20    


 


INR  1.05  (0.89-1.20)   12/14/15  05:20    


 


APTT  36.2 SECONDS (23.1-32.0)  H  12/14/15  05:20    














- Constitutional


Appears: Well, Non-toxic, No Acute Distress





- Head Exam


Head Exam: ATRAUMATIC, NORMAL INSPECTION, NORMOCEPHALIC





- Eye Exam


Eye Exam: EOMI, Normal appearance, PERRL


Pupil Exam: NORMAL ACCOMODATION, PERRL





- ENT Exam


ENT Exam: Mucous Membranes Moist, Normal Exam





- Neck Exam


Neck Exam: Full ROM, Normal Inspection.  absent: Lymphadenopathy





- Respiratory Exam


Respiratory Exam: Clear to Ausculation Bilateral, NORMAL BREATHING PATTERN





- Cardiovascular Exam


Cardiovascular Exam: REGULAR RHYTHM, RRR, +S1, +S2.  absent: Murmur





- GI/Abdominal Exam


GI & Abdominal Exam: Soft, Normal Bowel Sounds.  absent: Tenderness





- Extremities Exam


Extremities Exam: Full ROM, Normal Capillary Refill, Normal Inspection.  absent

: Joint Swelling, Pedal Edema





- Back Exam


Back Exam: NORMAL INSPECTION





- Neurological Exam


Neurological Exam: Alert, Awake, CN II-XII Intact, Normal Gait, Oriented x3





- Psychiatric Exam


Psychiatric exam: Normal Affect, Normal Mood





- Skin


Skin Exam: Dry, Intact, Normal Color, Warm





Assessment and Plan


(1) DVT prophylaxis


Status: Chronic   





(2) Scrotal edema


Status: Chronic   





(3) CAD (coronary artery disease)


Status: Chronic   





(4) Smoking


Status: Chronic   





(5) Low back pain


Status: Chronic   





(6) Prediabetes


Status: Chronic   





(7) Neurocognitive disorder


Status: Chronic

## 2017-06-03 RX ADMIN — DIVALPROEX SODIUM SCH MG: 250 TABLET, DELAYED RELEASE ORAL at 16:13

## 2017-06-03 RX ADMIN — DIVALPROEX SODIUM SCH MG: 250 TABLET, DELAYED RELEASE ORAL at 09:07

## 2017-06-03 NOTE — CP.PCM.PN
Subjective





- Date & Time of Evaluation


Date of Evaluation: 06/03/17


Time of Evaluation: 07:00





- Subjective


Subjective: 








Subjective





- Date & Time of Evaluation


Date of Evaluation: 06/02/17


Time of Evaluation: 07:27





- Subjective


Subjective: 





S/O no complaitns/distress, nof /c, n/v/d, pending placement


A/P pending placement, cont meds, diuet nad exercise





Objective





- Vital Signs/Intake and Output


Vital Signs (last 24 hours): 


 











Temp Pulse Resp BP Pulse Ox


 


 97.2 F L  79   20   110/89   99 


 


 06/02/17 00:27  06/02/17 00:27  06/02/17 00:27  06/02/17 00:27  06/02/17 00:27











- Medications


Medications: 


 Current Medications





Aspirin (Ecotrin)  81 mg PO DAILY [facility]


   Last Admin: 06/01/17 10:33 Dose:  81 mg


Atorvastatin Calcium (Lipitor)  20 mg PO HS [facility]


   Last Admin: 06/01/17 21:59 Dose:  20 mg


Divalproex Sodium (Depakote Dr(*Bid*))  500 mg PO BID [facility]


   Last Admin: 06/01/17 17:17 Dose:  500 mg


Enalapril Maleate (Vasotec)  10 mg PO DAILY [facility]


   Last Admin: 06/01/17 10:35 Dose:  10 mg


Famotidine (Pepcid)  20 mg PO BID [facility]


   Last Admin: 06/01/17 17:17 Dose:  20 mg


Fenofibrate (Tricor)  145 mg PO DAILY [facility]


   Last Admin: 06/01/17 10:33 Dose:  145 mg


Metoprolol Tartrate (Lopressor)  50 mg PO Q12 [facility]


   Last Admin: 06/01/17 21:59 Dose:  50 mg


Quetiapine Fumarate (Seroquel)  25 mg PO HS [facility]


   Last Admin: 06/01/17 21:59 Dose:  25 mg











- Labs


Labs: 


 





 03/23/17 05:45 





 03/23/17 05:45 





 











PT  11.2 SECONDS (9.4-12.0)   12/14/15  05:20    


 


INR  1.05  (0.89-1.20)   12/14/15  05:20    


 


APTT  36.2 SECONDS (23.1-32.0)  H  12/14/15  05:20    














- Constitutional


Appears: Well, Non-toxic, No Acute Distress





- Head Exam


Head Exam: ATRAUMATIC, NORMAL INSPECTION, NORMOCEPHALIC





- Eye Exam


Eye Exam: EOMI, Normal appearance, PERRL


Pupil Exam: NORMAL ACCOMODATION, PERRL





- ENT Exam


ENT Exam: Mucous Membranes Moist, Normal Exam





- Neck Exam


Neck Exam: Full ROM, Normal Inspection.  absent: Lymphadenopathy





- Respiratory Exam


Respiratory Exam: Clear to Ausculation Bilateral, NORMAL BREATHING PATTERN





- Cardiovascular Exam


Cardiovascular Exam: REGULAR RHYTHM, RRR, +S1, +S2.  absent: Murmur





- GI/Abdominal Exam


GI & Abdominal Exam: Soft, Normal Bowel Sounds.  absent: Tenderness





- Extremities Exam


Extremities Exam: Full ROM, Normal Capillary Refill, Normal Inspection.  absent

: Joint Swelling, Pedal Edema





- Back Exam


Back Exam: NORMAL INSPECTION





- Neurological Exam


Neurological Exam: Alert, Awake, CN II-XII Intact, Normal Gait, Oriented x3





- Psychiatric Exam


Psychiatric exam: Normal Affect, Normal Mood





- Skin


Skin Exam: Dry, Intact, Normal Color, Warm





Assessment and Plan


(1) DVT prophylaxis


Status: Chronic   





(2) Scrotal edema


Status: Chronic   





(3) CAD (coronary artery disease)


Status: Chronic   





(4) Smoking


Status: Chronic   





(5) Low back pain


Status: Chronic   





(6) Prediabetes


Status: Chronic   





(7) Neurocognitive disorder


Status: Chronic   





Objective





- Vital Signs/Intake and Output


Vital Signs (last 24 hours): 


 











Temp Pulse Resp BP Pulse Ox


 


 98.6 F   81   18   119/79   96 


 


 06/03/17 16:29  06/03/17 16:29  06/03/17 16:29  06/03/17 16:29  06/03/17 16:29











- Medications


Medications: 


 Current Medications





Aspirin (Ecotrin)  81 mg PO DAILY [facility]


   Last Admin: 06/03/17 09:08 Dose:  81 mg


Atorvastatin Calcium (Lipitor)  20 mg PO HS [facility]


   Last Admin: 06/02/17 21:17 Dose:  20 mg


Divalproex Sodium (Depakote Dr(*Bid*))  500 mg PO BID [facility]


   Last Admin: 06/03/17 16:13 Dose:  500 mg


Enalapril Maleate (Vasotec)  10 mg PO DAILY [facility]


   Last Admin: 06/03/17 09:09 Dose:  10 mg


Famotidine (Pepcid)  20 mg PO BID [facility]


   Last Admin: 06/03/17 16:14 Dose:  20 mg


Fenofibrate (Tricor)  145 mg PO DAILY [facility]


   Last Admin: 06/03/17 09:08 Dose:  145 mg


Metoprolol Tartrate (Lopressor)  50 mg PO Q12 [facility]


   Last Admin: 06/03/17 09:08 Dose:  50 mg


Quetiapine Fumarate (Seroquel)  25 mg PO HS [facility]


   Last Admin: 06/02/17 21:17 Dose:  25 mg











- Labs


Labs: 


 





 03/23/17 05:45 





 03/23/17 05:45 





 











PT  11.2 SECONDS (9.4-12.0)   12/14/15  05:20    


 


INR  1.05  (0.89-1.20)   12/14/15  05:20    


 


APTT  36.2 SECONDS (23.1-32.0)  H  12/14/15  05:20

## 2017-06-04 RX ADMIN — DIVALPROEX SODIUM SCH MG: 250 TABLET, DELAYED RELEASE ORAL at 16:59

## 2017-06-04 RX ADMIN — DIVALPROEX SODIUM SCH MG: 250 TABLET, DELAYED RELEASE ORAL at 09:00

## 2017-06-04 NOTE — CP.PCM.PN
Subjective





- Date & Time of Evaluation


Date of Evaluation: 06/04/17


Time of Evaluation: 09:00





- Subjective


Subjective: 








Subjective





- Date & Time of Evaluation


Date of Evaluation: 06/03/17


Time of Evaluation: 07:00





- Subjective


Subjective: 








Subjective





- Date & Time of Evaluation


Date of Evaluation: 06/02/17


Time of Evaluation: 07:27





- Subjective


Subjective: 





S/O no complaitns/distress, nof /c, n/v/d, pending placement


A/P pending placement, cont meds, diuet nad exercise





Objective





- Vital Signs/Intake and Output


Vital Signs (last 24 hours): 


 











Temp Pulse Resp BP Pulse Ox


 


 97.2 F L  79   20   110/89   99 


 


 06/02/17 00:27  06/02/17 00:27  06/02/17 00:27  06/02/17 00:27  06/02/17 00:27











- Medications


Medications: 


 Current Medications





Aspirin (Ecotrin)  81 mg PO DAILY [facility]


   Last Admin: 06/01/17 10:33 Dose:  81 mg


Atorvastatin Calcium (Lipitor)  20 mg PO HS [facility]


   Last Admin: 06/01/17 21:59 Dose:  20 mg


Divalproex Sodium (Depakote Dr(*Bid*))  500 mg PO BID [facility]


   Last Admin: 06/01/17 17:17 Dose:  500 mg


Enalapril Maleate (Vasotec)  10 mg PO DAILY [facility]


   Last Admin: 06/01/17 10:35 Dose:  10 mg


Famotidine (Pepcid)  20 mg PO BID [facility]


   Last Admin: 06/01/17 17:17 Dose:  20 mg


Fenofibrate (Tricor)  145 mg PO DAILY [facility]


   Last Admin: 06/01/17 10:33 Dose:  145 mg


Metoprolol Tartrate (Lopressor)  50 mg PO Q12 [facility]


   Last Admin: 06/01/17 21:59 Dose:  50 mg


Quetiapine Fumarate (Seroquel)  25 mg PO HS [facility]


   Last Admin: 06/01/17 21:59 Dose:  25 mg











- Labs


Labs: 


 





 03/23/17 05:45 





 03/23/17 05:45 





 











PT  11.2 SECONDS (9.4-12.0)   12/14/15  05:20    


 


INR  1.05  (0.89-1.20)   12/14/15  05:20    


 


APTT  36.2 SECONDS (23.1-32.0)  H  12/14/15  05:20    














- Constitutional


Appears: Well, Non-toxic, No Acute Distress





- Head Exam


Head Exam: ATRAUMATIC, NORMAL INSPECTION, NORMOCEPHALIC





- Eye Exam


Eye Exam: EOMI, Normal appearance, PERRL


Pupil Exam: NORMAL ACCOMODATION, PERRL





- ENT Exam


ENT Exam: Mucous Membranes Moist, Normal Exam





- Neck Exam


Neck Exam: Full ROM, Normal Inspection.  absent: Lymphadenopathy





- Respiratory Exam


Respiratory Exam: Clear to Ausculation Bilateral, NORMAL BREATHING PATTERN





- Cardiovascular Exam


Cardiovascular Exam: REGULAR RHYTHM, RRR, +S1, +S2.  absent: Murmur





- GI/Abdominal Exam


GI & Abdominal Exam: Soft, Normal Bowel Sounds.  absent: Tenderness





- Extremities Exam


Extremities Exam: Full ROM, Normal Capillary Refill, Normal Inspection.  absent

: Joint Swelling, Pedal Edema





- Back Exam


Back Exam: NORMAL INSPECTION





- Neurological Exam


Neurological Exam: Alert, Awake, CN II-XII Intact, Normal Gait, Oriented x3





- Psychiatric Exam


Psychiatric exam: Normal Affect, Normal Mood





- Skin


Skin Exam: Dry, Intact, Normal Color, Warm





Assessment and Plan


(1) DVT prophylaxis


Status: Chronic   





(2) Scrotal edema


Status: Chronic   





(3) CAD (coronary artery disease)


Status: Chronic   





(4) Smoking


Status: Chronic   





(5) Low back pain


Status: Chronic   





(6) Prediabetes


Status: Chronic   





(7) Neurocognitive disorder


Status: Chronic   





Objective





- Vital Signs/Intake and Output


Vital Signs (last 24 hours): 


 











Temp Pulse Resp BP Pulse Ox


 


 98.6 F   81   18   119/79   96 


 


 06/03/17 16:29  06/03/17 16:29  06/03/17 16:29  06/03/17 16:29  06/03/17 16:29











- Medications


Medications: 


 Current Medications





Aspirin (Ecotrin)  81 mg PO DAILY [facility]


   Last Admin: 06/03/17 09:08 Dose:  81 mg


Atorvastatin Calcium (Lipitor)  20 mg PO HS [facility]


   Last Admin: 06/02/17 21:17 Dose:  20 mg


Divalproex Sodium (Depakote Dr(*Bid*))  500 mg PO BID [facility]


   Last Admin: 06/03/17 16:13 Dose:  500 mg


Enalapril Maleate (Vasotec)  10 mg PO DAILY [facility]


   Last Admin: 06/03/17 09:09 Dose:  10 mg


Famotidine (Pepcid)  20 mg PO BID [facility]


   Last Admin: 06/03/17 16:14 Dose:  20 mg


Fenofibrate (Tricor)  145 mg PO DAILY [facility]


   Last Admin: 06/03/17 09:08 Dose:  145 mg


Metoprolol Tartrate (Lopressor)  50 mg PO Q12 [facility]


   Last Admin: 06/03/17 09:08 Dose:  50 mg


Quetiapine Fumarate (Seroquel)  25 mg PO HS [facility]


   Last Admin: 06/02/17 21:17 Dose:  25 mg











- Labs


Labs: 


 





 03/23/17 05:45 





 03/23/17 05:45 





 











PT  11.2 SECONDS (9.4-12.0)   12/14/15  05:20    


 


INR  1.05  (0.89-1.20)   12/14/15  05:20    


 


APTT  36.2 SECONDS (23.1-32.0)  H  12/14/15  05:20    

















Objective





- Vital Signs/Intake and Output


Vital Signs (last 24 hours): 


 











Temp Pulse Resp BP Pulse Ox


 


 97.6 F   65   20   104/68   99 


 


 06/04/17 08:13  06/04/17 08:13  06/04/17 08:13  06/04/17 09:15  06/04/17 08:13











- Medications


Medications: 


 Current Medications





Aspirin (Ecotrin)  81 mg PO DAILY [facility]


   Last Admin: 06/04/17 09:17 Dose:  81 mg


Atorvastatin Calcium (Lipitor)  20 mg PO HS [facility]


   Last Admin: 06/03/17 21:21 Dose:  20 mg


Divalproex Sodium (Depakote Dr(*Bid*))  500 mg PO BID [facility]


   Last Admin: 06/04/17 09:00 Dose:  500 mg


Enalapril Maleate (Vasotec)  10 mg PO DAILY [facility]


   Last Admin: 06/04/17 09:17 Dose:  10 mg


Famotidine (Pepcid)  20 mg PO BID [facility]


   Last Admin: 06/04/17 09:14 Dose:  20 mg


Fenofibrate (Tricor)  145 mg PO DAILY [facility]


   Last Admin: 06/04/17 09:16 Dose:  145 mg


Metoprolol Tartrate (Lopressor)  50 mg PO Q12 [facility]


   Last Admin: 06/04/17 09:15 Dose:  50 mg


Quetiapine Fumarate (Seroquel)  25 mg PO HS [facility]


   Last Admin: 06/03/17 21:22 Dose:  25 mg











- Labs


Labs: 


 





 03/23/17 05:45 





 03/23/17 05:45 





 











PT  11.2 SECONDS (9.4-12.0)   12/14/15  05:20    


 


INR  1.05  (0.89-1.20)   12/14/15  05:20    


 


APTT  36.2 SECONDS (23.1-32.0)  H  12/14/15  05:20

## 2017-06-05 RX ADMIN — DIVALPROEX SODIUM SCH MG: 250 TABLET, DELAYED RELEASE ORAL at 08:14

## 2017-06-05 RX ADMIN — DIVALPROEX SODIUM SCH MG: 250 TABLET, DELAYED RELEASE ORAL at 16:46

## 2017-06-05 NOTE — CP.PCM.PN
Subjective





- Date & Time of Evaluation


Date of Evaluation: 06/05/17


Time of Evaluation: 02:00





- Subjective


Subjective: 








Subjective





- Date & Time of Evaluation


Date of Evaluation: 06/04/17


Time of Evaluation: 09:00





- Subjective


Subjective: 








Subjective





- Date & Time of Evaluation


Date of Evaluation: 06/03/17


Time of Evaluation: 07:00





- Subjective


Subjective: 








Subjective





- Date & Time of Evaluation


Date of Evaluation: 06/02/17


Time of Evaluation: 07:27





- Subjective


Subjective: 





S/O no complaitns/distress, nof /c, n/v/d, pending placement


A/P pending placement, cont meds, diuet nad exercise





Objective





- Vital Signs/Intake and Output


Vital Signs (last 24 hours): 


 











Temp Pulse Resp BP Pulse Ox


 


 97.2 F L  79   20   110/89   99 


 


 06/02/17 00:27  06/02/17 00:27  06/02/17 00:27  06/02/17 00:27  06/02/17 00:27











- Medications


Medications: 


 Current Medications





Aspirin (Ecotrin)  81 mg PO DAILY [facility]


   Last Admin: 06/01/17 10:33 Dose:  81 mg


Atorvastatin Calcium (Lipitor)  20 mg PO HS [facility]


   Last Admin: 06/01/17 21:59 Dose:  20 mg


Divalproex Sodium (Depakote Dr(*Bid*))  500 mg PO BID [facility]


   Last Admin: 06/01/17 17:17 Dose:  500 mg


Enalapril Maleate (Vasotec)  10 mg PO DAILY [facility]


   Last Admin: 06/01/17 10:35 Dose:  10 mg


Famotidine (Pepcid)  20 mg PO BID [facility]


   Last Admin: 06/01/17 17:17 Dose:  20 mg


Fenofibrate (Tricor)  145 mg PO DAILY [facility]


   Last Admin: 06/01/17 10:33 Dose:  145 mg


Metoprolol Tartrate (Lopressor)  50 mg PO Q12 [facility]


   Last Admin: 06/01/17 21:59 Dose:  50 mg


Quetiapine Fumarate (Seroquel)  25 mg PO HS [facility]


   Last Admin: 06/01/17 21:59 Dose:  25 mg











- Labs


Labs: 


 





 03/23/17 05:45 





 03/23/17 05:45 





 











PT  11.2 SECONDS (9.4-12.0)   12/14/15  05:20    


 


INR  1.05  (0.89-1.20)   12/14/15  05:20    


 


APTT  36.2 SECONDS (23.1-32.0)  H  12/14/15  05:20    














- Constitutional


Appears: Well, Non-toxic, No Acute Distress





- Head Exam


Head Exam: ATRAUMATIC, NORMAL INSPECTION, NORMOCEPHALIC





- Eye Exam


Eye Exam: EOMI, Normal appearance, PERRL


Pupil Exam: NORMAL ACCOMODATION, PERRL





- ENT Exam


ENT Exam: Mucous Membranes Moist, Normal Exam





- Neck Exam


Neck Exam: Full ROM, Normal Inspection.  absent: Lymphadenopathy





- Respiratory Exam


Respiratory Exam: Clear to Ausculation Bilateral, NORMAL BREATHING PATTERN





- Cardiovascular Exam


Cardiovascular Exam: REGULAR RHYTHM, RRR, +S1, +S2.  absent: Murmur





- GI/Abdominal Exam


GI & Abdominal Exam: Soft, Normal Bowel Sounds.  absent: Tenderness





- Extremities Exam


Extremities Exam: Full ROM, Normal Capillary Refill, Normal Inspection.  absent

: Joint Swelling, Pedal Edema





- Back Exam


Back Exam: NORMAL INSPECTION





- Neurological Exam


Neurological Exam: Alert, Awake, CN II-XII Intact, Normal Gait, Oriented x3





- Psychiatric Exam


Psychiatric exam: Normal Affect, Normal Mood





- Skin


Skin Exam: Dry, Intact, Normal Color, Warm





Assessment and Plan


(1) DVT prophylaxis


Status: Chronic   





(2) Scrotal edema


Status: Chronic   





(3) CAD (coronary artery disease)


Status: Chronic   





(4) Smoking


Status: Chronic   





(5) Low back pain


Status: Chronic   





(6) Prediabetes


Status: Chronic   





(7) Neurocognitive disorder


Status: Chronic   





Objective





- Vital Signs/Intake and Output


Vital Signs (last 24 hours): 


 











Temp Pulse Resp BP Pulse Ox


 


 98.6 F   81   18   119/79   96 


 


 06/03/17 16:29  06/03/17 16:29  06/03/17 16:29  06/03/17 16:29  06/03/17 16:29











- Medications


Medications: 


 Current Medications





Aspirin (Ecotrin)  81 mg PO DAILY [facility]


   Last Admin: 06/03/17 09:08 Dose:  81 mg


Atorvastatin Calcium (Lipitor)  20 mg PO HS [facility]


   Last Admin: 06/02/17 21:17 Dose:  20 mg


Divalproex Sodium (Depakote Dr(*Bid*))  500 mg PO BID [facility]


   Last Admin: 06/03/17 16:13 Dose:  500 mg


Enalapril Maleate (Vasotec)  10 mg PO DAILY [facility]


   Last Admin: 06/03/17 09:09 Dose:  10 mg


Famotidine (Pepcid)  20 mg PO BID [facility]


   Last Admin: 06/03/17 16:14 Dose:  20 mg


Fenofibrate (Tricor)  145 mg PO DAILY [facility]


   Last Admin: 06/03/17 09:08 Dose:  145 mg


Metoprolol Tartrate (Lopressor)  50 mg PO Q12 [facility]


   Last Admin: 06/03/17 09:08 Dose:  50 mg


Quetiapine Fumarate (Seroquel)  25 mg PO HS [facility]


   Last Admin: 06/02/17 21:17 Dose:  25 mg











- Labs


Labs: 


 





 03/23/17 05:45 





 03/23/17 05:45 





 











PT  11.2 SECONDS (9.4-12.0)   12/14/15  05:20    


 


INR  1.05  (0.89-1.20)   12/14/15  05:20    


 


APTT  36.2 SECONDS (23.1-32.0)  H  12/14/15  05:20    

















Objective





- Vital Signs/Intake and Output


Vital Signs (last 24 hours): 


 











Temp Pulse Resp BP Pulse Ox


 


 97.6 F   65   20   104/68   99 


 


 06/04/17 08:13  06/04/17 08:13  06/04/17 08:13  06/04/17 09:15  06/04/17 08:13











- Medications


Medications: 


 Current Medications





Aspirin (Ecotrin)  81 mg PO DAILY [facility]


   Last Admin: 06/04/17 09:17 Dose:  81 mg


Atorvastatin Calcium (Lipitor)  20 mg PO [facility]


   Last Admin: 06/03/17 21:21 Dose:  20 mg


Divalproex Sodium (Depakote Dr(*Bid*))  500 mg PO BID [facility]


   Last Admin: 06/04/17 09:00 Dose:  500 mg


Enalapril Maleate (Vasotec)  10 mg PO DAILY [facility]


   Last Admin: 06/04/17 09:17 Dose:  10 mg


Famotidine (Pepcid)  20 mg PO BID [facility]


   Last Admin: 06/04/17 09:14 Dose:  20 mg


Fenofibrate (Tricor)  145 mg PO DAILY [facility]


   Last Admin: 06/04/17 09:16 Dose:  145 mg


Metoprolol Tartrate (Lopressor)  50 mg PO Q12 [facility]


   Last Admin: 06/04/17 09:15 Dose:  50 mg


Quetiapine Fumarate (Seroquel)  25 mg PO HS [facility]


   Last Admin: 06/03/17 21:22 Dose:  25 mg











- Labs


Labs: 


 





 03/23/17 05:45 





 03/23/17 05:45 





 











PT  11.2 SECONDS (9.4-12.0)   12/14/15  05:20    


 


INR  1.05  (0.89-1.20)   12/14/15  05:20    


 


APTT  36.2 SECONDS (23.1-32.0)  H  12/14/15  05:20    

















Objective





- Vital Signs/Intake and Output


Vital Signs (last 24 hours): 


 











Temp Pulse Resp BP Pulse Ox


 


 97.3 F L  65   20   117/79   95 


 


 06/05/17 07:51  06/05/17 07:51  06/05/17 07:51  06/05/17 07:51  06/05/17 07:51











- Medications


Medications: 


 Current Medications





Aspirin (Ecotrin)  81 mg PO DAILY [facility]


   Last Admin: 06/05/17 08:15 Dose:  81 mg


Atorvastatin Calcium (Lipitor)  20 mg PO HS [facility]


   Last Admin: 06/04/17 21:48 Dose:  20 mg


Divalproex Sodium (Depakote Dr(*Bid*))  500 mg PO BID [facility]


   Last Admin: 06/05/17 08:14 Dose:  500 mg


Enalapril Maleate (Vasotec)  10 mg PO DAILY [facility]


   Last Admin: 06/05/17 08:14 Dose:  10 mg


Famotidine (Pepcid)  20 mg PO BID [facility]


   Last Admin: 06/05/17 08:15 Dose:  20 mg


Fenofibrate (Tricor)  145 mg PO DAILY [facility]


   Last Admin: 06/05/17 08:14 Dose:  145 mg


Metoprolol Tartrate (Lopressor)  50 mg PO Q12 [facility]


   Last Admin: 06/05/17 08:14 Dose:  50 mg


Quetiapine Fumarate (Seroquel)  25 mg PO HS [facility]


   Last Admin: 06/04/17 21:47 Dose:  25 mg











- Labs


Labs: 


 





 03/23/17 05:45 





 03/23/17 05:45 





 











PT  11.2 SECONDS (9.4-12.0)   12/14/15  05:20    


 


INR  1.05  (0.89-1.20)   12/14/15  05:20    


 


APTT  36.2 SECONDS (23.1-32.0)  H  12/14/15  05:20

## 2017-06-06 RX ADMIN — DIVALPROEX SODIUM SCH MG: 250 TABLET, DELAYED RELEASE ORAL at 16:18

## 2017-06-06 RX ADMIN — DIVALPROEX SODIUM SCH MG: 250 TABLET, DELAYED RELEASE ORAL at 08:51

## 2017-06-06 NOTE — CP.PCM.PN
Subjective





- Date & Time of Evaluation


Date of Evaluation: 06/06/17


Time of Evaluation: 09:00





- Subjective


Subjective: 








Subjective





- Date & Time of Evaluation


Date of Evaluation: 06/05/17


Time of Evaluation: 02:00





- Subjective


Subjective: 








Subjective





- Date & Time of Evaluation


Date of Evaluation: 06/04/17


Time of Evaluation: 09:00





- Subjective


Subjective: 








Subjective





- Date & Time of Evaluation


Date of Evaluation: 06/03/17


Time of Evaluation: 07:00





- Subjective


Subjective: 








Subjective





- Date & Time of Evaluation


Date of Evaluation: 06/02/17


Time of Evaluation: 07:27





- Subjective


Subjective: 





S/O no complaitns/distress, nof /c, n/v/d, pending placement


A/P pending placement, cont meds, diuet nad exercise





Objective





- Vital Signs/Intake and Output


Vital Signs (last 24 hours): 


 











Temp Pulse Resp BP Pulse Ox


 


 97.2 F L  79   20   110/89   99 


 


 06/02/17 00:27  06/02/17 00:27  06/02/17 00:27  06/02/17 00:27  06/02/17 00:27











- Medications


Medications: 


 Current Medications





Aspirin (Ecotrin)  81 mg PO DAILY [facility]


   Last Admin: 06/01/17 10:33 Dose:  81 mg


Atorvastatin Calcium (Lipitor)  20 mg PO HS [facility]


   Last Admin: 06/01/17 21:59 Dose:  20 mg


Divalproex Sodium (Depakote Dr(*Bid*))  500 mg PO BID [facility]


   Last Admin: 06/01/17 17:17 Dose:  500 mg


Enalapril Maleate (Vasotec)  10 mg PO DAILY [facility]


   Last Admin: 06/01/17 10:35 Dose:  10 mg


Famotidine (Pepcid)  20 mg PO BID [facility]


   Last Admin: 06/01/17 17:17 Dose:  20 mg


Fenofibrate (Tricor)  145 mg PO DAILY [facility]


   Last Admin: 06/01/17 10:33 Dose:  145 mg


Metoprolol Tartrate (Lopressor)  50 mg PO Q12 [facility]


   Last Admin: 06/01/17 21:59 Dose:  50 mg


Quetiapine Fumarate (Seroquel)  25 mg PO HS [facility]


   Last Admin: 06/01/17 21:59 Dose:  25 mg











- Labs


Labs: 


 





 03/23/17 05:45 





 03/23/17 05:45 





 











PT  11.2 SECONDS (9.4-12.0)   12/14/15  05:20    


 


INR  1.05  (0.89-1.20)   12/14/15  05:20    


 


APTT  36.2 SECONDS (23.1-32.0)  H  12/14/15  05:20    














- Constitutional


Appears: Well, Non-toxic, No Acute Distress





- Head Exam


Head Exam: ATRAUMATIC, NORMAL INSPECTION, NORMOCEPHALIC





- Eye Exam


Eye Exam: EOMI, Normal appearance, PERRL


Pupil Exam: NORMAL ACCOMODATION, PERRL





- ENT Exam


ENT Exam: Mucous Membranes Moist, Normal Exam





- Neck Exam


Neck Exam: Full ROM, Normal Inspection.  absent: Lymphadenopathy





- Respiratory Exam


Respiratory Exam: Clear to Ausculation Bilateral, NORMAL BREATHING PATTERN





- Cardiovascular Exam


Cardiovascular Exam: REGULAR RHYTHM, RRR, +S1, +S2.  absent: Murmur





- GI/Abdominal Exam


GI & Abdominal Exam: Soft, Normal Bowel Sounds.  absent: Tenderness





- Extremities Exam


Extremities Exam: Full ROM, Normal Capillary Refill, Normal Inspection.  absent

: Joint Swelling, Pedal Edema





- Back Exam


Back Exam: NORMAL INSPECTION





- Neurological Exam


Neurological Exam: Alert, Awake, CN II-XII Intact, Normal Gait, Oriented x3





- Psychiatric Exam


Psychiatric exam: Normal Affect, Normal Mood





- Skin


Skin Exam: Dry, Intact, Normal Color, Warm





Assessment and Plan


(1) DVT prophylaxis


Status: Chronic   





(2) Scrotal edema


Status: Chronic   





(3) CAD (coronary artery disease)


Status: Chronic   





(4) Smoking


Status: Chronic   





(5) Low back pain


Status: Chronic   





(6) Prediabetes


Status: Chronic   





(7) Neurocognitive disorder


Status: Chronic   





Objective





- Vital Signs/Intake and Output


Vital Signs (last 24 hours): 


 











Temp Pulse Resp BP Pulse Ox


 


 98.6 F   81   18   119/79   96 


 


 06/03/17 16:29  06/03/17 16:29  06/03/17 16:29  06/03/17 16:29  06/03/17 16:29











- Medications


Medications: 


 Current Medications





Aspirin (Ecotrin)  81 mg PO DAILY [facility]


   Last Admin: 06/03/17 09:08 Dose:  81 mg


Atorvastatin Calcium (Lipitor)  20 mg PO HS [facility]


   Last Admin: 06/02/17 21:17 Dose:  20 mg


Divalproex Sodium (Depakote Dr(*Bid*))  500 mg PO BID [facility]


   Last Admin: 06/03/17 16:13 Dose:  500 mg


Enalapril Maleate (Vasotec)  10 mg PO DAILY [facility]


   Last Admin: 06/03/17 09:09 Dose:  10 mg


Famotidine (Pepcid)  20 mg PO BID [facility]


   Last Admin: 06/03/17 16:14 Dose:  20 mg


Fenofibrate (Tricor)  145 mg PO DAILY [facility]


   Last Admin: 06/03/17 09:08 Dose:  145 mg


Metoprolol Tartrate (Lopressor)  50 mg PO Q12 [facility]


   Last Admin: 06/03/17 09:08 Dose:  50 mg


Quetiapine Fumarate (Seroquel)  25 mg PO HS [facility]


   Last Admin: 06/02/17 21:17 Dose:  25 mg











- Labs


Labs: 


 





 03/23/17 05:45 





 03/23/17 05:45 





 











PT  11.2 SECONDS (9.4-12.0)   12/14/15  05:20    


 


INR  1.05  (0.89-1.20)   12/14/15  05:20    


 


APTT  36.2 SECONDS (23.1-32.0)  H  12/14/15  05:20    

















Objective





- Vital Signs/Intake and Output


Vital Signs (last 24 hours): 


 











Temp Pulse Resp BP Pulse Ox


 


 97.6 F   65   20   104/68   99 


 


 06/04/17 08:13  06/04/17 08:13  06/04/17 08:13  06/04/17 09:15  06/04/17 08:13











- Medications


Medications: 


 Current Medications





Aspirin (Ecotrin)  81 mg PO DAILY [facility]


   Last Admin: 06/04/17 09:17 Dose:  81 mg


Atorvastatin Calcium (Lipitor)  20 mg PO HS [facility]


   Last Admin: 06/03/17 21:21 Dose:  20 mg


Divalproex Sodium (Depakote Dr(*Bid*))  500 mg PO BID [facility]


   Last Admin: 06/04/17 09:00 Dose:  500 mg


Enalapril Maleate (Vasotec)  10 mg PO DAILY [facility]


   Last Admin: 06/04/17 09:17 Dose:  10 mg


Famotidine (Pepcid)  20 mg PO BID [facility]


   Last Admin: 06/04/17 09:14 Dose:  20 mg


Fenofibrate (Tricor)  145 mg PO DAILY [facility]


   Last Admin: 06/04/17 09:16 Dose:  145 mg


Metoprolol Tartrate (Lopressor)  50 mg PO Q12 [facility]


   Last Admin: 06/04/17 09:15 Dose:  50 mg


Quetiapine Fumarate (Seroquel)  25 mg PO HS [facility]


   Last Admin: 06/03/17 21:22 Dose:  25 mg











- Labs


Labs: 


 





 03/23/17 05:45 





 03/23/17 05:45 





 











PT  11.2 SECONDS (9.4-12.0)   12/14/15  05:20    


 


INR  1.05  (0.89-1.20)   12/14/15  05:20    


 


APTT  36.2 SECONDS (23.1-32.0)  H  12/14/15  05:20    

















Objective





- Vital Signs/Intake and Output


Vital Signs (last 24 hours): 


 











Temp Pulse Resp BP Pulse Ox


 


 97.3 F L  65   20   117/79   95 


 


 06/05/17 07:51  06/05/17 07:51  06/05/17 07:51  06/05/17 07:51  06/05/17 07:51











- Medications


Medications: 


 Current Medications





Aspirin (Ecotrin)  81 mg PO DAILY [facility]


   Last Admin: 06/05/17 08:15 Dose:  81 mg


Atorvastatin Calcium (Lipitor)  20 mg PO HS [facility]


   Last Admin: 06/04/17 21:48 Dose:  20 mg


Divalproex Sodium (Depakote Dr(*Bid*))  500 mg PO BID [facility]


   Last Admin: 06/05/17 08:14 Dose:  500 mg


Enalapril Maleate (Vasotec)  10 mg PO DAILY [facility]


   Last Admin: 06/05/17 08:14 Dose:  10 mg


Famotidine (Pepcid)  20 mg PO BID [facility]


   Last Admin: 06/05/17 08:15 Dose:  20 mg


Fenofibrate (Tricor)  145 mg PO DAILY [facility]


   Last Admin: 06/05/17 08:14 Dose:  145 mg


Metoprolol Tartrate (Lopressor)  50 mg PO Q12 [facility]


   Last Admin: 06/05/17 08:14 Dose:  50 mg


Quetiapine Fumarate (Seroquel)  25 mg PO HS [facility]


   Last Admin: 06/04/17 21:47 Dose:  25 mg











- Labs


Labs: 


 





 03/23/17 05:45 





 03/23/17 05:45 





 











PT  11.2 SECONDS (9.4-12.0)   12/14/15  05:20    


 


INR  1.05  (0.89-1.20)   12/14/15  05:20    


 


APTT  36.2 SECONDS (23.1-32.0)  H  12/14/15  05:20    

















Objective





- Vital Signs/Intake and Output


Vital Signs (last 24 hours): 


 











Temp Pulse Resp BP Pulse Ox


 


 97.6 F   62   20   154/63 H  97 


 


 06/06/17 07:39  06/06/17 08:49  06/06/17 07:39  06/06/17 08:49  06/06/17 07:39











- Medications


Medications: 


 Current Medications





Aspirin (Ecotrin)  81 mg PO DAILY [facility]


   Last Admin: 06/06/17 08:49 Dose:  81 mg


Atorvastatin Calcium (Lipitor)  20 mg PO HS [facility]


   Last Admin: 06/05/17 21:03 Dose:  20 mg


Divalproex Sodium (Depakote Dr(*Bid*))  500 mg PO BID [facility]


   Last Admin: 06/06/17 08:51 Dose:  500 mg


Enalapril Maleate (Vasotec)  10 mg PO DAILY [facility]


   Last Admin: 06/06/17 08:51 Dose:  10 mg


Famotidine (Pepcid)  20 mg PO BID [facility]


   Last Admin: 06/06/17 08:51 Dose:  20 mg


Fenofibrate (Tricor)  145 mg PO DAILY [facility]


   Last Admin: 06/06/17 08:51 Dose:  145 mg


Metoprolol Tartrate (Lopressor)  50 mg PO Q12 [facility]


   Last Admin: 06/06/17 08:49 Dose:  50 mg


Quetiapine Fumarate (Seroquel)  25 mg PO HS [facility]


   Last Admin: 06/05/17 21:03 Dose:  25 mg











- Labs


Labs: 


 





 03/23/17 05:45 





 03/23/17 05:45 





 











PT  11.2 SECONDS (9.4-12.0)   12/14/15  05:20    


 


INR  1.05  (0.89-1.20)   12/14/15  05:20    


 


APTT  36.2 SECONDS (23.1-32.0)  H  12/14/15  05:20

## 2017-06-07 RX ADMIN — DIVALPROEX SODIUM SCH MG: 250 TABLET, DELAYED RELEASE ORAL at 08:48

## 2017-06-07 RX ADMIN — DIVALPROEX SODIUM SCH MG: 250 TABLET, DELAYED RELEASE ORAL at 17:00

## 2017-06-07 NOTE — CP.PCM.PN
Subjective





- Date & Time of Evaluation


Date of Evaluation: 06/07/17


Time of Evaluation: 08:00





- Subjective


Subjective: 








Subjective





- Date & Time of Evaluation


Date of Evaluation: 06/06/17


Time of Evaluation: 09:00





- Subjective


Subjective: 








Subjective





- Date & Time of Evaluation


Date of Evaluation: 06/05/17


Time of Evaluation: 02:00





- Subjective


Subjective: 








Subjective





- Date & Time of Evaluation


Date of Evaluation: 06/04/17


Time of Evaluation: 09:00





- Subjective


Subjective: 








Subjective





- Date & Time of Evaluation


Date of Evaluation: 06/03/17


Time of Evaluation: 07:00





- Subjective


Subjective: 








Subjective





- Date & Time of Evaluation


Date of Evaluation: 06/02/17


Time of Evaluation: 07:27





- Subjective


Subjective: 





S/O no complaitns/distress, nof /c, n/v/d, pending placement


A/P pending placement, cont meds, diuet nad exercise





Objective





- Vital Signs/Intake and Output


Vital Signs (last 24 hours): 


 











Temp Pulse Resp BP Pulse Ox


 


 97.2 F L  79   20   110/89   99 


 


 06/02/17 00:27  06/02/17 00:27  06/02/17 00:27  06/02/17 00:27  06/02/17 00:27











- Medications


Medications: 


 Current Medications





Aspirin (Ecotrin)  81 mg PO DAILY [facility]


   Last Admin: 06/01/17 10:33 Dose:  81 mg


Atorvastatin Calcium (Lipitor)  20 mg PO [facility]


   Last Admin: 06/01/17 21:59 Dose:  20 mg


Divalproex Sodium (Depakote Dr(*Bid*))  500 mg PO BID [facility]


   Last Admin: 06/01/17 17:17 Dose:  500 mg


Enalapril Maleate (Vasotec)  10 mg PO DAILY [facility]


   Last Admin: 06/01/17 10:35 Dose:  10 mg


Famotidine (Pepcid)  20 mg PO BID [facility]


   Last Admin: 06/01/17 17:17 Dose:  20 mg


Fenofibrate (Tricor)  145 mg PO DAILY [facility]


   Last Admin: 06/01/17 10:33 Dose:  145 mg


Metoprolol Tartrate (Lopressor)  50 mg PO Q12 [facility]


   Last Admin: 06/01/17 21:59 Dose:  50 mg


Quetiapine Fumarate (Seroquel)  25 mg PO [facility]


   Last Admin: 06/01/17 21:59 Dose:  25 mg











- Labs


Labs: 


 





 03/23/17 05:45 





 03/23/17 05:45 





 











PT  11.2 SECONDS (9.4-12.0)   12/14/15  05:20    


 


INR  1.05  (0.89-1.20)   12/14/15  05:20    


 


APTT  36.2 SECONDS (23.1-32.0)  H  12/14/15  05:20    














- Constitutional


Appears: Well, Non-toxic, No Acute Distress





- Head Exam


Head Exam: ATRAUMATIC, NORMAL INSPECTION, NORMOCEPHALIC





- Eye Exam


Eye Exam: EOMI, Normal appearance, PERRL


Pupil Exam: NORMAL ACCOMODATION, PERRL





- ENT Exam


ENT Exam: Mucous Membranes Moist, Normal Exam





- Neck Exam


Neck Exam: Full ROM, Normal Inspection.  absent: Lymphadenopathy





- Respiratory Exam


Respiratory Exam: Clear to Ausculation Bilateral, NORMAL BREATHING PATTERN





- Cardiovascular Exam


Cardiovascular Exam: REGULAR RHYTHM, RRR, +S1, +S2.  absent: Murmur





- GI/Abdominal Exam


GI & Abdominal Exam: Soft, Normal Bowel Sounds.  absent: Tenderness





- Extremities Exam


Extremities Exam: Full ROM, Normal Capillary Refill, Normal Inspection.  absent

: Joint Swelling, Pedal Edema





- Back Exam


Back Exam: NORMAL INSPECTION





- Neurological Exam


Neurological Exam: Alert, Awake, CN II-XII Intact, Normal Gait, Oriented x3





- Psychiatric Exam


Psychiatric exam: Normal Affect, Normal Mood





- Skin


Skin Exam: Dry, Intact, Normal Color, Warm





Assessment and Plan


(1) DVT prophylaxis


Status: Chronic   





(2) Scrotal edema


Status: Chronic   





(3) CAD (coronary artery disease)


Status: Chronic   





(4) Smoking


Status: Chronic   





(5) Low back pain


Status: Chronic   





(6) Prediabetes


Status: Chronic   





(7) Neurocognitive disorder


Status: Chronic   





Objective





- Vital Signs/Intake and Output


Vital Signs (last 24 hours): 


 











Temp Pulse Resp BP Pulse Ox


 


 98.6 F   81   18   119/79   96 


 


 06/03/17 16:29  06/03/17 16:29  06/03/17 16:29  06/03/17 16:29  06/03/17 16:29











- Medications


Medications: 


 Current Medications





Aspirin (Ecotrin)  81 mg PO DAILY [facility]


   Last Admin: 06/03/17 09:08 Dose:  81 mg


Atorvastatin Calcium (Lipitor)  20 mg PO HS [facility]


   Last Admin: 06/02/17 21:17 Dose:  20 mg


Divalproex Sodium (Depakote Dr(*Bid*))  500 mg PO BID [facility]


   Last Admin: 06/03/17 16:13 Dose:  500 mg


Enalapril Maleate (Vasotec)  10 mg PO DAILY [facility]


   Last Admin: 06/03/17 09:09 Dose:  10 mg


Famotidine (Pepcid)  20 mg PO BID [facility]


   Last Admin: 06/03/17 16:14 Dose:  20 mg


Fenofibrate (Tricor)  145 mg PO DAILY [facility]


   Last Admin: 06/03/17 09:08 Dose:  145 mg


Metoprolol Tartrate (Lopressor)  50 mg PO Q12 [facility]


   Last Admin: 06/03/17 09:08 Dose:  50 mg


Quetiapine Fumarate (Seroquel)  25 mg PO [facility]


   Last Admin: 06/02/17 21:17 Dose:  25 mg











- Labs


Labs: 


 





 03/23/17 05:45 





 03/23/17 05:45 





 











PT  11.2 SECONDS (9.4-12.0)   12/14/15  05:20    


 


INR  1.05  (0.89-1.20)   12/14/15  05:20    


 


APTT  36.2 SECONDS (23.1-32.0)  H  12/14/15  05:20    

















Objective





- Vital Signs/Intake and Output


Vital Signs (last 24 hours): 


 











Temp Pulse Resp BP Pulse Ox


 


 97.6 F   65   20   104/68   99 


 


 06/04/17 08:13  06/04/17 08:13  06/04/17 08:13  06/04/17 09:15  06/04/17 08:13











- Medications


Medications: 


 Current Medications





Aspirin (Ecotrin)  81 mg PO DAILY [facility]


   Last Admin: 06/04/17 09:17 Dose:  81 mg


Atorvastatin Calcium (Lipitor)  20 mg PO [facility]


   Last Admin: 06/03/17 21:21 Dose:  20 mg


Divalproex Sodium (Depakote Dr(*Bid*))  500 mg PO BID [facility]


   Last Admin: 06/04/17 09:00 Dose:  500 mg


Enalapril Maleate (Vasotec)  10 mg PO DAILY [facility]


   Last Admin: 06/04/17 09:17 Dose:  10 mg


Famotidine (Pepcid)  20 mg PO BID [facility]


   Last Admin: 06/04/17 09:14 Dose:  20 mg


Fenofibrate (Tricor)  145 mg PO DAILY [facility]


   Last Admin: 06/04/17 09:16 Dose:  145 mg


Metoprolol Tartrate (Lopressor)  50 mg PO Q12 [facility]


   Last Admin: 06/04/17 09:15 Dose:  50 mg


Quetiapine Fumarate (Seroquel)  25 mg PO HS [facility]


   Last Admin: 06/03/17 21:22 Dose:  25 mg











- Labs


Labs: 


 





 03/23/17 05:45 





 03/23/17 05:45 





 











PT  11.2 SECONDS (9.4-12.0)   12/14/15  05:20    


 


INR  1.05  (0.89-1.20)   12/14/15  05:20    


 


APTT  36.2 SECONDS (23.1-32.0)  H  12/14/15  05:20    

















Objective





- Vital Signs/Intake and Output


Vital Signs (last 24 hours): 


 











Temp Pulse Resp BP Pulse Ox


 


 97.3 F L  65   20   117/79   95 


 


 06/05/17 07:51  06/05/17 07:51  06/05/17 07:51  06/05/17 07:51  06/05/17 07:51











- Medications


Medications: 


 Current Medications





Aspirin (Ecotrin)  81 mg PO DAILY [facility]


   Last Admin: 06/05/17 08:15 Dose:  81 mg


Atorvastatin Calcium (Lipitor)  20 mg PO HS [facility]


   Last Admin: 06/04/17 21:48 Dose:  20 mg


Divalproex Sodium (Depakote Dr(*Bid*))  500 mg PO BID [facility]


   Last Admin: 06/05/17 08:14 Dose:  500 mg


Enalapril Maleate (Vasotec)  10 mg PO DAILY [facility]


   Last Admin: 06/05/17 08:14 Dose:  10 mg


Famotidine (Pepcid)  20 mg PO BID [facility]


   Last Admin: 06/05/17 08:15 Dose:  20 mg


Fenofibrate (Tricor)  145 mg PO DAILY [facility]


   Last Admin: 06/05/17 08:14 Dose:  145 mg


Metoprolol Tartrate (Lopressor)  50 mg PO Q12 [facility]


   Last Admin: 06/05/17 08:14 Dose:  50 mg


Quetiapine Fumarate (Seroquel)  25 mg PO HS [facility]


   Last Admin: 06/04/17 21:47 Dose:  25 mg











- Labs


Labs: 


 





 03/23/17 05:45 





 03/23/17 05:45 





 











PT  11.2 SECONDS (9.4-12.0)   12/14/15  05:20    


 


INR  1.05  (0.89-1.20)   12/14/15  05:20    


 


APTT  36.2 SECONDS (23.1-32.0)  H  12/14/15  05:20    

















Objective





- Vital Signs/Intake and Output


Vital Signs (last 24 hours): 


 











Temp Pulse Resp BP Pulse Ox


 


 97.6 F   62   20   154/63 H  97 


 


 06/06/17 07:39  06/06/17 08:49  06/06/17 07:39  06/06/17 08:49  06/06/17 07:39











- Medications


Medications: 


 Current Medications





Aspirin (Ecotrin)  81 mg PO DAILY [facility]


   Last Admin: 06/06/17 08:49 Dose:  81 mg


Atorvastatin Calcium (Lipitor)  20 mg PO HS [facility]


   Last Admin: 06/05/17 21:03 Dose:  20 mg


Divalproex Sodium (Depakote Dr(*Bid*))  500 mg PO BID [facility]


   Last Admin: 06/06/17 08:51 Dose:  500 mg


Enalapril Maleate (Vasotec)  10 mg PO DAILY [facility]


   Last Admin: 06/06/17 08:51 Dose:  10 mg


Famotidine (Pepcid)  20 mg PO BID [facility]


   Last Admin: 06/06/17 08:51 Dose:  20 mg


Fenofibrate (Tricor)  145 mg PO DAILY [facility]


   Last Admin: 06/06/17 08:51 Dose:  145 mg


Metoprolol Tartrate (Lopressor)  50 mg PO Q12 [facility]


   Last Admin: 06/06/17 08:49 Dose:  50 mg


Quetiapine Fumarate (Seroquel)  25 mg PO HS [facility]


   Last Admin: 06/05/17 21:03 Dose:  25 mg











- Labs


Labs: 


 





 03/23/17 05:45 





 03/23/17 05:45 





 











PT  11.2 SECONDS (9.4-12.0)   12/14/15  05:20    


 


INR  1.05  (0.89-1.20)   12/14/15  05:20    


 


APTT  36.2 SECONDS (23.1-32.0)  H  12/14/15  05:20    

















Objective





- Vital Signs/Intake and Output


Vital Signs (last 24 hours): 


 











Temp Pulse Resp BP Pulse Ox


 


 98.3 F   87   20   118/87   99 


 


 06/07/17 00:18  06/07/17 00:18  06/07/17 00:18  06/07/17 00:18  06/07/17 00:18











- Medications


Medications: 


 Current Medications





Aspirin (Ecotrin)  81 mg PO DAILY [facility]


   Last Admin: 06/06/17 08:49 Dose:  81 mg


Atorvastatin Calcium (Lipitor)  20 mg PO HS [facility]


   Last Admin: 06/06/17 21:12 Dose:  20 mg


Divalproex Sodium (Depakote Dr(*Bid*))  500 mg PO BID [facility]


   Last Admin: 06/06/17 16:18 Dose:  500 mg


Enalapril Maleate (Vasotec)  10 mg PO DAILY [facility]


   Last Admin: 06/06/17 08:51 Dose:  10 mg


Famotidine (Pepcid)  20 mg PO BID [facility]


   Last Admin: 06/06/17 16:18 Dose:  20 mg


Fenofibrate (Tricor)  145 mg PO DAILY [facility]


   Last Admin: 06/06/17 08:51 Dose:  145 mg


Metoprolol Tartrate (Lopressor)  50 mg PO Q12 [facility]


   Last Admin: 06/06/17 21:12 Dose:  50 mg


Quetiapine Fumarate (Seroquel)  25 mg PO HS [facility]


   Last Admin: 06/06/17 21:12 Dose:  25 mg











- Labs


Labs: 


 





 03/23/17 05:45 





 03/23/17 05:45 





 











PT  11.2 SECONDS (9.4-12.0)   12/14/15  05:20    


 


INR  1.05  (0.89-1.20)   12/14/15  05:20    


 


APTT  36.2 SECONDS (23.1-32.0)  H  12/14/15  05:20

## 2017-06-08 RX ADMIN — DIVALPROEX SODIUM SCH MG: 250 TABLET, DELAYED RELEASE ORAL at 17:08

## 2017-06-08 RX ADMIN — DIVALPROEX SODIUM SCH MG: 250 TABLET, DELAYED RELEASE ORAL at 08:36

## 2017-06-08 NOTE — CP.PCM.PN
Subjective





- Date & Time of Evaluation


Date of Evaluation: 06/08/17


Time of Evaluation: 10:00





- Subjective


Subjective: 








Subjective





- Date & Time of Evaluation


Date of Evaluation: 06/07/17


Time of Evaluation: 08:00





- Subjective


Subjective: 








Subjective





- Date & Time of Evaluation


Date of Evaluation: 06/06/17


Time of Evaluation: 09:00





- Subjective


Subjective: 








Subjective





- Date & Time of Evaluation


Date of Evaluation: 06/05/17


Time of Evaluation: 02:00





- Subjective


Subjective: 








Subjective





- Date & Time of Evaluation


Date of Evaluation: 06/04/17


Time of Evaluation: 09:00





- Subjective


Subjective: 








Subjective





- Date & Time of Evaluation


Date of Evaluation: 06/03/17


Time of Evaluation: 07:00





- Subjective


Subjective: 








Subjective





- Date & Time of Evaluation


Date of Evaluation: 06/02/17


Time of Evaluation: 07:27





- Subjective


Subjective: 





S/O no complaitns/distress, nof /c, n/v/d, pending placement


A/P pending placement, cont meds, diuet nad exercise





Objective





- Vital Signs/Intake and Output


Vital Signs (last 24 hours): 


 











Temp Pulse Resp BP Pulse Ox


 


 97.2 F L  79   20   110/89   99 


 


 06/02/17 00:27  06/02/17 00:27  06/02/17 00:27  06/02/17 00:27  06/02/17 00:27











- Medications


Medications: 


 Current Medications





Aspirin (Ecotrin)  81 mg PO DAILY [facility]


   Last Admin: 06/01/17 10:33 Dose:  81 mg


Atorvastatin Calcium (Lipitor)  20 mg PO HS [facility]


   Last Admin: 06/01/17 21:59 Dose:  20 mg


Divalproex Sodium (Depakote Dr(*Bid*))  500 mg PO BID [facility]


   Last Admin: 06/01/17 17:17 Dose:  500 mg


Enalapril Maleate (Vasotec)  10 mg PO DAILY [facility]


   Last Admin: 06/01/17 10:35 Dose:  10 mg


Famotidine (Pepcid)  20 mg PO BID [facility]


   Last Admin: 06/01/17 17:17 Dose:  20 mg


Fenofibrate (Tricor)  145 mg PO DAILY [facility]


   Last Admin: 06/01/17 10:33 Dose:  145 mg


Metoprolol Tartrate (Lopressor)  50 mg PO Q12 [facility]


   Last Admin: 06/01/17 21:59 Dose:  50 mg


Quetiapine Fumarate (Seroquel)  25 mg PO HS [facility]


   Last Admin: 06/01/17 21:59 Dose:  25 mg











- Labs


Labs: 


 





 03/23/17 05:45 





 03/23/17 05:45 





 











PT  11.2 SECONDS (9.4-12.0)   12/14/15  05:20    


 


INR  1.05  (0.89-1.20)   12/14/15  05:20    


 


APTT  36.2 SECONDS (23.1-32.0)  H  12/14/15  05:20    














- Constitutional


Appears: Well, Non-toxic, No Acute Distress





- Head Exam


Head Exam: ATRAUMATIC, NORMAL INSPECTION, NORMOCEPHALIC





- Eye Exam


Eye Exam: EOMI, Normal appearance, PERRL


Pupil Exam: NORMAL ACCOMODATION, PERRL





- ENT Exam


ENT Exam: Mucous Membranes Moist, Normal Exam





- Neck Exam


Neck Exam: Full ROM, Normal Inspection.  absent: Lymphadenopathy





- Respiratory Exam


Respiratory Exam: Clear to Ausculation Bilateral, NORMAL BREATHING PATTERN





- Cardiovascular Exam


Cardiovascular Exam: REGULAR RHYTHM, RRR, +S1, +S2.  absent: Murmur





- GI/Abdominal Exam


GI & Abdominal Exam: Soft, Normal Bowel Sounds.  absent: Tenderness





- Extremities Exam


Extremities Exam: Full ROM, Normal Capillary Refill, Normal Inspection.  absent

: Joint Swelling, Pedal Edema





- Back Exam


Back Exam: NORMAL INSPECTION





- Neurological Exam


Neurological Exam: Alert, Awake, CN II-XII Intact, Normal Gait, Oriented x3





- Psychiatric Exam


Psychiatric exam: Normal Affect, Normal Mood





- Skin


Skin Exam: Dry, Intact, Normal Color, Warm





Assessment and Plan


(1) DVT prophylaxis


Status: Chronic   





(2) Scrotal edema


Status: Chronic   





(3) CAD (coronary artery disease)


Status: Chronic   





(4) Smoking


Status: Chronic   





(5) Low back pain


Status: Chronic   





(6) Prediabetes


Status: Chronic   





(7) Neurocognitive disorder


Status: Chronic   





Objective





- Vital Signs/Intake and Output


Vital Signs (last 24 hours): 


 











Temp Pulse Resp BP Pulse Ox


 


 98.6 F   81   18   119/79   96 


 


 06/03/17 16:29  06/03/17 16:29  06/03/17 16:29  06/03/17 16:29  06/03/17 16:29











- Medications


Medications: 


 Current Medications





Aspirin (Ecotrin)  81 mg PO DAILY [facility]


   Last Admin: 06/03/17 09:08 Dose:  81 mg


Atorvastatin Calcium (Lipitor)  20 mg PO HS [facility]


   Last Admin: 06/02/17 21:17 Dose:  20 mg


Divalproex Sodium (Depakote Dr(*Bid*))  500 mg PO BID [facility]


   Last Admin: 06/03/17 16:13 Dose:  500 mg


Enalapril Maleate (Vasotec)  10 mg PO DAILY [facility]


   Last Admin: 06/03/17 09:09 Dose:  10 mg


Famotidine (Pepcid)  20 mg PO BID [facility]


   Last Admin: 06/03/17 16:14 Dose:  20 mg


Fenofibrate (Tricor)  145 mg PO DAILY [facility]


   Last Admin: 06/03/17 09:08 Dose:  145 mg


Metoprolol Tartrate (Lopressor)  50 mg PO Q12 [facility]


   Last Admin: 06/03/17 09:08 Dose:  50 mg


Quetiapine Fumarate (Seroquel)  25 mg PO HS [facility]


   Last Admin: 06/02/17 21:17 Dose:  25 mg











- Labs


Labs: 


 





 03/23/17 05:45 





 03/23/17 05:45 





 











PT  11.2 SECONDS (9.4-12.0)   12/14/15  05:20    


 


INR  1.05  (0.89-1.20)   12/14/15  05:20    


 


APTT  36.2 SECONDS (23.1-32.0)  H  12/14/15  05:20    

















Objective





- Vital Signs/Intake and Output


Vital Signs (last 24 hours): 


 











Temp Pulse Resp BP Pulse Ox


 


 97.6 F   65   20   104/68   99 


 


 06/04/17 08:13  06/04/17 08:13  06/04/17 08:13  06/04/17 09:15  06/04/17 08:13











- Medications


Medications: 


 Current Medications





Aspirin (Ecotrin)  81 mg PO DAILY [facility]


   Last Admin: 06/04/17 09:17 Dose:  81 mg


Atorvastatin Calcium (Lipitor)  20 mg PO HS [facility]


   Last Admin: 06/03/17 21:21 Dose:  20 mg


Divalproex Sodium (Depakote Dr(*Bid*))  500 mg PO BID [facility]


   Last Admin: 06/04/17 09:00 Dose:  500 mg


Enalapril Maleate (Vasotec)  10 mg PO DAILY [facility]


   Last Admin: 06/04/17 09:17 Dose:  10 mg


Famotidine (Pepcid)  20 mg PO BID [facility]


   Last Admin: 06/04/17 09:14 Dose:  20 mg


Fenofibrate (Tricor)  145 mg PO DAILY [facility]


   Last Admin: 06/04/17 09:16 Dose:  145 mg


Metoprolol Tartrate (Lopressor)  50 mg PO Q12 [facility]


   Last Admin: 06/04/17 09:15 Dose:  50 mg


Quetiapine Fumarate (Seroquel)  25 mg PO HS [facility]


   Last Admin: 06/03/17 21:22 Dose:  25 mg











- Labs


Labs: 


 





 03/23/17 05:45 





 03/23/17 05:45 





 











PT  11.2 SECONDS (9.4-12.0)   12/14/15  05:20    


 


INR  1.05  (0.89-1.20)   12/14/15  05:20    


 


APTT  36.2 SECONDS (23.1-32.0)  H  12/14/15  05:20    

















Objective





- Vital Signs/Intake and Output


Vital Signs (last 24 hours): 


 











Temp Pulse Resp BP Pulse Ox


 


 97.3 F L  65   20   117/79   95 


 


 06/05/17 07:51  06/05/17 07:51  06/05/17 07:51  06/05/17 07:51  06/05/17 07:51











- Medications


Medications: 


 Current Medications





Aspirin (Ecotrin)  81 mg PO DAILY [facility]


   Last Admin: 06/05/17 08:15 Dose:  81 mg


Atorvastatin Calcium (Lipitor)  20 mg PO [facility]


   Last Admin: 06/04/17 21:48 Dose:  20 mg


Divalproex Sodium (Depakote Dr(*Bid*))  500 mg PO BID [facility]


   Last Admin: 06/05/17 08:14 Dose:  500 mg


Enalapril Maleate (Vasotec)  10 mg PO DAILY [facility]


   Last Admin: 06/05/17 08:14 Dose:  10 mg


Famotidine (Pepcid)  20 mg PO BID [facility]


   Last Admin: 06/05/17 08:15 Dose:  20 mg


Fenofibrate (Tricor)  145 mg PO DAILY [facility]


   Last Admin: 06/05/17 08:14 Dose:  145 mg


Metoprolol Tartrate (Lopressor)  50 mg PO Q12 [facility]


   Last Admin: 06/05/17 08:14 Dose:  50 mg


Quetiapine Fumarate (Seroquel)  25 mg PO HS [facility]


   Last Admin: 06/04/17 21:47 Dose:  25 mg











- Labs


Labs: 


 





 03/23/17 05:45 





 03/23/17 05:45 





 











PT  11.2 SECONDS (9.4-12.0)   12/14/15  05:20    


 


INR  1.05  (0.89-1.20)   12/14/15  05:20    


 


APTT  36.2 SECONDS (23.1-32.0)  H  12/14/15  05:20    

















Objective





- Vital Signs/Intake and Output


Vital Signs (last 24 hours): 


 











Temp Pulse Resp BP Pulse Ox


 


 97.6 F   62   20   154/63 H  97 


 


 06/06/17 07:39  06/06/17 08:49  06/06/17 07:39  06/06/17 08:49  06/06/17 07:39











- Medications


Medications: 


 Current Medications





Aspirin (Ecotrin)  81 mg PO DAILY [facility]


   Last Admin: 06/06/17 08:49 Dose:  81 mg


Atorvastatin Calcium (Lipitor)  20 mg PO [facility]


   Last Admin: 06/05/17 21:03 Dose:  20 mg


Divalproex Sodium (Depakote Dr(*Bid*))  500 mg PO BID [facility]


   Last Admin: 06/06/17 08:51 Dose:  500 mg


Enalapril Maleate (Vasotec)  10 mg PO DAILY [facility]


   Last Admin: 06/06/17 08:51 Dose:  10 mg


Famotidine (Pepcid)  20 mg PO BID [facility]


   Last Admin: 06/06/17 08:51 Dose:  20 mg


Fenofibrate (Tricor)  145 mg PO DAILY [facility]


   Last Admin: 06/06/17 08:51 Dose:  145 mg


Metoprolol Tartrate (Lopressor)  50 mg PO Q12 [facility]


   Last Admin: 06/06/17 08:49 Dose:  50 mg


Quetiapine Fumarate (Seroquel)  25 mg PO HS [facility]


   Last Admin: 06/05/17 21:03 Dose:  25 mg











- Labs


Labs: 


 





 03/23/17 05:45 





 03/23/17 05:45 





 











PT  11.2 SECONDS (9.4-12.0)   12/14/15  05:20    


 


INR  1.05  (0.89-1.20)   12/14/15  05:20    


 


APTT  36.2 SECONDS (23.1-32.0)  H  12/14/15  05:20    

















Objective





- Vital Signs/Intake and Output


Vital Signs (last 24 hours): 


 











Temp Pulse Resp BP Pulse Ox


 


 98.3 F   87   20   118/87   99 


 


 06/07/17 00:18  06/07/17 00:18  06/07/17 00:18  06/07/17 00:18  06/07/17 00:18











- Medications


Medications: 


 Current Medications





Aspirin (Ecotrin)  81 mg PO DAILY [facility]


   Last Admin: 06/06/17 08:49 Dose:  81 mg


Atorvastatin Calcium (Lipitor)  20 mg PO HS [facility]


   Last Admin: 06/06/17 21:12 Dose:  20 mg


Divalproex Sodium (Depakote Dr(*Bid*))  500 mg PO BID [facility]


   Last Admin: 06/06/17 16:18 Dose:  500 mg


Enalapril Maleate (Vasotec)  10 mg PO DAILY [facility]


   Last Admin: 06/06/17 08:51 Dose:  10 mg


Famotidine (Pepcid)  20 mg PO BID [facility]


   Last Admin: 06/06/17 16:18 Dose:  20 mg


Fenofibrate (Tricor)  145 mg PO DAILY [facility]


   Last Admin: 06/06/17 08:51 Dose:  145 mg


Metoprolol Tartrate (Lopressor)  50 mg PO Q12 [facility]


   Last Admin: 06/06/17 21:12 Dose:  50 mg


Quetiapine Fumarate (Seroquel)  25 mg PO HS [facility]


   Last Admin: 06/06/17 21:12 Dose:  25 mg











- Labs


Labs: 


 





 03/23/17 05:45 





 03/23/17 05:45 





 











PT  11.2 SECONDS (9.4-12.0)   12/14/15  05:20    


 


INR  1.05  (0.89-1.20)   12/14/15  05:20    


 


APTT  36.2 SECONDS (23.1-32.0)  H  12/14/15  05:20    

















Objective





- Vital Signs/Intake and Output


Vital Signs (last 24 hours): 


 











Temp Pulse Resp BP Pulse Ox


 


 98 F   76   18   96/67 L  100 


 


 06/08/17 08:23  06/08/17 08:23  06/08/17 08:23  06/08/17 08:37  06/08/17 08:23











- Medications


Medications: 


 Current Medications





Atorvastatin Calcium (Lipitor)  20 mg PO [facility]


   Last Admin: 06/07/17 21:02 Dose:  20 mg


Divalproex Sodium (Depakote Dr(*Bid*))  500 mg PO BID [facility]


   Last Admin: 06/08/17 08:36 Dose:  500 mg


Enalapril Maleate (Vasotec)  10 mg PO DAILY [facility]


   Last Admin: 06/08/17 08:38 Dose:  Not Given


Fenofibrate (Tricor)  145 mg PO DAILY [facility]


   Last Admin: 06/08/17 08:37 Dose:  145 mg


Metoprolol Tartrate (Lopressor)  50 mg PO Q12 [facility]


   Last Admin: 06/08/17 08:37 Dose:  Not Given


Quetiapine Fumarate (Seroquel)  25 mg PO HS [facility]


   Last Admin: 06/07/17 21:02 Dose:  25 mg











- Labs


Labs: 


 





 03/23/17 05:45 





 03/23/17 05:45 





 











PT  11.2 SECONDS (9.4-12.0)   12/14/15  05:20    


 


INR  1.05  (0.89-1.20)   12/14/15  05:20    


 


APTT  36.2 SECONDS (23.1-32.0)  H  12/14/15  05:20

## 2017-06-09 RX ADMIN — DIVALPROEX SODIUM SCH MG: 250 TABLET, DELAYED RELEASE ORAL at 17:27

## 2017-06-09 RX ADMIN — DIVALPROEX SODIUM SCH MG: 250 TABLET, DELAYED RELEASE ORAL at 08:55

## 2017-06-09 NOTE — CP.PCM.PN
Subjective





- Date & Time of Evaluation


Date of Evaluation: 06/09/17


Time of Evaluation: 08:48





- Subjective


Subjective: 





S/O no complaints/distress, no f/c n/v/d. pending placement


A/P cont current plan





Objective





- Vital Signs/Intake and Output


Vital Signs (last 24 hours): 


 











Temp Pulse Resp BP Pulse Ox


 


 97.7 F   62   20   96/68 L  94 L


 


 06/09/17 08:15  06/09/17 08:15  06/09/17 08:15  06/09/17 08:15  06/09/17 08:15











- Medications


Medications: 


 Current Medications





Atorvastatin Calcium (Lipitor)  20 mg PO [facility]


   Last Admin: 06/08/17 21:05 Dose:  20 mg


Divalproex Sodium (Depakote Dr(*Bid*))  500 mg PO BID [facility]


   Last Admin: 06/08/17 17:08 Dose:  500 mg


Enalapril Maleate (Vasotec)  10 mg PO DAILY [facility]


   Last Admin: 06/08/17 13:03 Dose:  10 mg


Fenofibrate (Tricor)  145 mg PO DAILY [facility]


   Last Admin: 06/08/17 08:37 Dose:  145 mg


Metoprolol Tartrate (Lopressor)  50 mg PO Q12 [facility]


   Last Admin: 06/08/17 21:04 Dose:  50 mg


Quetiapine Fumarate (Seroquel)  25 mg PO [facility]


   Last Admin: 06/08/17 21:05 Dose:  25 mg











- Labs


Labs: 


 





 03/23/17 05:45 





 03/23/17 05:45 





 











PT  11.2 SECONDS (9.4-12.0)   12/14/15  05:20    


 


INR  1.05  (0.89-1.20)   12/14/15  05:20    


 


APTT  36.2 SECONDS (23.1-32.0)  H  12/14/15  05:20    














- Constitutional


Appears: Well, Non-toxic, No Acute Distress





- Head Exam


Head Exam: ATRAUMATIC, NORMAL INSPECTION, NORMOCEPHALIC





- Eye Exam


Eye Exam: EOMI, Normal appearance, PERRL


Pupil Exam: NORMAL ACCOMODATION, PERRL





- ENT Exam


ENT Exam: Mucous Membranes Moist, Normal Exam





- Neck Exam


Neck Exam: Full ROM, Normal Inspection.  absent: Lymphadenopathy





- Respiratory Exam


Respiratory Exam: Clear to Ausculation Bilateral, NORMAL BREATHING PATTERN





- Cardiovascular Exam


Cardiovascular Exam: REGULAR RHYTHM, RRR, +S1, +S2.  absent: Murmur





- GI/Abdominal Exam


GI & Abdominal Exam: Soft, Normal Bowel Sounds.  absent: Tenderness





- Extremities Exam


Extremities Exam: Full ROM, Normal Capillary Refill, Normal Inspection.  absent

: Joint Swelling, Pedal Edema





- Back Exam


Back Exam: NORMAL INSPECTION





- Neurological Exam


Neurological Exam: Alert, Awake, CN II-XII Intact, Normal Gait, Oriented x3





- Psychiatric Exam


Psychiatric exam: Normal Affect, Normal Mood





- Skin


Skin Exam: Dry, Intact, Normal Color, Warm





Assessment and Plan


(1) DVT prophylaxis


Status: Chronic   





(2) Scrotal edema


Status: Chronic   





(3) CAD (coronary artery disease)


Status: Chronic   





(4) Smoking


Status: Chronic   





(5) Low back pain


Status: Chronic   





(6) Prediabetes


Status: Chronic   





(7) Neurocognitive disorder


Status: Chronic

## 2017-06-10 RX ADMIN — DIVALPROEX SODIUM SCH MG: 250 TABLET, DELAYED RELEASE ORAL at 16:43

## 2017-06-10 RX ADMIN — DIVALPROEX SODIUM SCH MG: 250 TABLET, DELAYED RELEASE ORAL at 09:10

## 2017-06-10 NOTE — CP.PCM.PN
Subjective





- Date & Time of Evaluation


Date of Evaluation: 06/10/17


Time of Evaluation: 09:39





- Subjective


Subjective: 





S/O no complaints/distress, no f/c n/v/d. pending placement


A/P cont current plan





Objective





- Vital Signs/Intake and Output


Vital Signs (last 24 hours): 


 











Temp Pulse Resp BP Pulse Ox


 


 97 F L  66   18   98/72 L  97 


 


 06/10/17 08:17  06/10/17 09:10  06/10/17 08:17  06/10/17 09:10  06/10/17 08:17











- Medications


Medications: 


 Current Medications





Atorvastatin Calcium (Lipitor)  20 mg PO [facility]


   Last Admin: 06/09/17 21:30 Dose:  20 mg


Divalproex Sodium (Depakote Dr(*Bid*))  500 mg PO BID [facility]


   Last Admin: 06/10/17 09:10 Dose:  500 mg


Enalapril Maleate (Vasotec)  10 mg PO DAILY [facility]


   Last Admin: 06/10/17 09:11 Dose:  Not Given


Fenofibrate (Tricor)  145 mg PO DAILY [facility]


   Last Admin: 06/10/17 09:11 Dose:  145 mg


Metoprolol Tartrate (Lopressor)  50 mg PO Q12 [facility]


   Last Admin: 06/10/17 09:10 Dose:  Not Given


Quetiapine Fumarate (Seroquel)  25 mg PO HS [facility]


   Last Admin: 06/09/17 21:31 Dose:  25 mg











- Labs


Labs: 


 





 03/23/17 05:45 





 03/23/17 05:45 





 











PT  11.2 SECONDS (9.4-12.0)   12/14/15  05:20    


 


INR  1.05  (0.89-1.20)   12/14/15  05:20    


 


APTT  36.2 SECONDS (23.1-32.0)  H  12/14/15  05:20    














- Constitutional


Appears: Well, Non-toxic, No Acute Distress





- Head Exam


Head Exam: ATRAUMATIC, NORMAL INSPECTION, NORMOCEPHALIC





- Eye Exam


Eye Exam: EOMI, Normal appearance, PERRL


Pupil Exam: NORMAL ACCOMODATION, PERRL





- ENT Exam


ENT Exam: Mucous Membranes Moist, Normal Exam





- Neck Exam


Neck Exam: Full ROM, Normal Inspection.  absent: Lymphadenopathy





- Respiratory Exam


Respiratory Exam: Clear to Ausculation Bilateral, NORMAL BREATHING PATTERN





- Cardiovascular Exam


Cardiovascular Exam: REGULAR RHYTHM, RRR, +S1, +S2.  absent: Murmur





- GI/Abdominal Exam


GI & Abdominal Exam: Soft, Normal Bowel Sounds.  absent: Tenderness





- Extremities Exam


Extremities Exam: Full ROM, Normal Capillary Refill, Normal Inspection.  absent

: Joint Swelling, Pedal Edema





- Back Exam


Back Exam: NORMAL INSPECTION





- Neurological Exam


Neurological Exam: Alert, Awake, CN II-XII Intact, Normal Gait, Oriented x3





- Psychiatric Exam


Psychiatric exam: Normal Affect, Normal Mood





- Skin


Skin Exam: Dry, Intact, Normal Color, Warm





Assessment and Plan


(1) DVT prophylaxis


Status: Chronic   





(2) Scrotal edema


Status: Chronic   





(3) CAD (coronary artery disease)


Status: Chronic   





(4) Smoking


Status: Chronic   





(5) Low back pain


Status: Chronic   





(6) Prediabetes


Status: Chronic   





(7) Neurocognitive disorder


Status: Chronic

## 2017-06-11 RX ADMIN — DIVALPROEX SODIUM SCH MG: 250 TABLET, DELAYED RELEASE ORAL at 16:40

## 2017-06-11 RX ADMIN — DIVALPROEX SODIUM SCH MG: 250 TABLET, DELAYED RELEASE ORAL at 08:27

## 2017-06-11 NOTE — CP.PCM.PN
Subjective





- Date & Time of Evaluation


Date of Evaluation: 06/11/17


Time of Evaluation: 21:10





- Subjective


Subjective: 





S/O no complaints/distress, no f/c n/v/d. pending placement


A/P cont current plan





Objective





- Vital Signs/Intake and Output


Vital Signs (last 24 hours): 


 











Temp Pulse Resp BP Pulse Ox


 


 98.5 F   78   20   110/71   99 


 


 06/11/17 16:45  06/11/17 21:02  06/11/17 16:45  06/11/17 21:02  06/11/17 16:45











- Medications


Medications: 


 Current Medications





Atorvastatin Calcium (Lipitor)  20 mg PO HS [facility]


   Last Admin: 06/11/17 21:03 Dose:  20 mg


Divalproex Sodium (Depakote Dr(*Bid*))  500 mg PO BID [facility]


   Last Admin: 06/11/17 16:40 Dose:  500 mg


Enalapril Maleate (Vasotec)  10 mg PO DAILY [facility]


   Last Admin: 06/11/17 08:28 Dose:  Not Given


Fenofibrate (Tricor)  145 mg PO DAILY [facility]


   Last Admin: 06/11/17 08:27 Dose:  145 mg


Metoprolol Tartrate (Lopressor)  50 mg PO Q12 [facility]


   Last Admin: 06/11/17 21:02 Dose:  50 mg


Quetiapine Fumarate (Seroquel)  25 mg PO HS [facility]


   Last Admin: 06/11/17 21:03 Dose:  25 mg











- Labs


Labs: 


 





 03/23/17 05:45 





 03/23/17 05:45 





 











PT  11.2 SECONDS (9.4-12.0)   12/14/15  05:20    


 


INR  1.05  (0.89-1.20)   12/14/15  05:20    


 


APTT  36.2 SECONDS (23.1-32.0)  H  12/14/15  05:20    














- Constitutional


Appears: Well, Non-toxic, No Acute Distress





- Head Exam


Head Exam: ATRAUMATIC, NORMAL INSPECTION, NORMOCEPHALIC





- Eye Exam


Eye Exam: EOMI, Normal appearance, PERRL


Pupil Exam: NORMAL ACCOMODATION, PERRL





- ENT Exam


ENT Exam: Mucous Membranes Moist, Normal Exam





- Neck Exam


Neck Exam: Full ROM, Normal Inspection.  absent: Lymphadenopathy





- Respiratory Exam


Respiratory Exam: Clear to Ausculation Bilateral, NORMAL BREATHING PATTERN





- Cardiovascular Exam


Cardiovascular Exam: REGULAR RHYTHM, +S1, +S2.  absent: Murmur





- GI/Abdominal Exam


GI & Abdominal Exam: Soft, Normal Bowel Sounds.  absent: Tenderness





- Extremities Exam


Extremities Exam: Full ROM, Normal Capillary Refill, Normal Inspection.  absent

: Joint Swelling, Pedal Edema





- Back Exam


Back Exam: NORMAL INSPECTION





- Neurological Exam


Neurological Exam: Alert, Awake, CN II-XII Intact, Normal Gait, Oriented x3





- Psychiatric Exam


Psychiatric exam: Normal Affect, Normal Mood





- Skin


Skin Exam: Dry, Intact, Normal Color, Warm





Assessment and Plan


(1) DVT prophylaxis


Status: Chronic   





(2) Scrotal edema


Status: Chronic   





(3) CAD (coronary artery disease)


Status: Chronic   





(4) Smoking


Status: Chronic   





(5) Low back pain


Status: Chronic   





(6) Prediabetes


Status: Chronic   





(7) Neurocognitive disorder


Status: Chronic

## 2017-06-12 RX ADMIN — DIVALPROEX SODIUM SCH MG: 250 TABLET, DELAYED RELEASE ORAL at 16:02

## 2017-06-12 RX ADMIN — DIVALPROEX SODIUM SCH MG: 250 TABLET, DELAYED RELEASE ORAL at 08:35

## 2017-06-12 NOTE — [email or web address]
Subjective





- Date & Time of Evaluation


Date of Evaluation: 06/12/17


Time of Evaluation: 07:36





- Subjective


Subjective: 





S/O no complaints/distress, no f/c n/v/d. pending placement


A/P cont current plan





Objective





- Vital Signs/Intake and Output


Vital Signs (last 24 hours): 


 











Temp Pulse Resp BP Pulse Ox


 


 97.3 F L  62   20   93/61 L  99 


 


 06/12/17 07:29  06/12/17 07:29  06/12/17 07:29  06/12/17 07:29  06/12/17 07:29











- Medications


Medications: 


 Current Medications





Atorvastatin Calcium (Lipitor)  20 mg PO [facility]


   Last Admin: 06/11/17 21:03 Dose:  20 mg


Divalproex Sodium (Depakote Dr(*Bid*))  500 mg PO BID [facility]


   Last Admin: 06/11/17 16:40 Dose:  500 mg


Enalapril Maleate (Vasotec)  10 mg PO DAILY [facility]


   Last Admin: 06/11/17 08:28 Dose:  Not Given


Fenofibrate (Tricor)  145 mg PO DAILY [facility]


   Last Admin: 06/11/17 08:27 Dose:  145 mg


Metoprolol Tartrate (Lopressor)  50 mg PO Q12 [facility]


   Last Admin: 06/11/17 21:02 Dose:  50 mg


Quetiapine Fumarate (Seroquel)  25 mg PO [facility]


   Last Admin: 06/11/17 21:03 Dose:  25 mg











- Labs


Labs: 


 





 03/23/17 05:45 





 03/23/17 05:45 





 











PT  11.2 SECONDS (9.4-12.0)   12/14/15  05:20    


 


INR  1.05  (0.89-1.20)   12/14/15  05:20    


 


APTT  36.2 SECONDS (23.1-32.0)  H  12/14/15  05:20    














- Constitutional


Appears: Well, Non-toxic, No Acute Distress





- Head Exam


Head Exam: ATRAUMATIC, NORMAL INSPECTION, NORMOCEPHALIC





- Eye Exam


Eye Exam: EOMI, Normal appearance, PERRL


Pupil Exam: NORMAL ACCOMODATION, PERRL





- ENT Exam


ENT Exam: Mucous Membranes Moist, Normal Exam





- Neck Exam


Neck Exam: Full ROM, Normal Inspection.  absent: Lymphadenopathy





- Respiratory Exam


Respiratory Exam: Clear to Ausculation Bilateral, NORMAL BREATHING PATTERN





- Cardiovascular Exam


Cardiovascular Exam: REGULAR RHYTHM, RRR, +S1, +S2.  absent: Murmur





- GI/Abdominal Exam


GI & Abdominal Exam: Soft, Normal Bowel Sounds.  absent: Tenderness





- Extremities Exam


Extremities Exam: Full ROM, Normal Capillary Refill, Normal Inspection.  absent

: Joint Swelling, Pedal Edema





- Back Exam


Back Exam: NORMAL INSPECTION





- Neurological Exam


Neurological Exam: Alert, Awake, CN II-XII Intact, Normal Gait, Oriented x3





- Psychiatric Exam


Psychiatric exam: Normal Affect, Normal Mood





- Skin


Skin Exam: Dry, Intact, Normal Color, Warm





Assessment and Plan


(1) DVT prophylaxis


Status: Chronic   





(2) Scrotal edema


Status: Chronic   





(3) CAD (coronary artery disease)


Status: Chronic   





(4) Smoking


Status: Chronic   





(5) Low back pain


Status: Chronic   





(6) Prediabetes


Status: Chronic   





(7) Neurocognitive disorder


Status: Chronic

## 2017-06-13 RX ADMIN — DIVALPROEX SODIUM SCH MG: 250 TABLET, DELAYED RELEASE ORAL at 17:04

## 2017-06-13 RX ADMIN — DIVALPROEX SODIUM SCH MG: 250 TABLET, DELAYED RELEASE ORAL at 08:26

## 2017-06-13 NOTE — CP.PCM.PN
Subjective





- Date & Time of Evaluation


Date of Evaluation: 06/13/17


Time of Evaluation: 11:41





- Subjective


Subjective: 





S/O no complaints/distress, no f/c n/v/d. pending placement


A/P cont current plan





Objective





- Vital Signs/Intake and Output


Vital Signs (last 24 hours): 


 











Temp Pulse Resp BP Pulse Ox


 


 97.1 F L  66   20   104/67   98 


 


 06/13/17 07:54  06/13/17 08:26  06/13/17 07:54  06/13/17 08:26  06/13/17 07:54











- Medications


Medications: 


 Current Medications





Atorvastatin Calcium (Lipitor)  20 mg PO [facility]


   Last Admin: 06/12/17 21:05 Dose:  20 mg


Divalproex Sodium (Depakote Dr(*Bid*))  [facility] mg [facility]ID [facility]


   Last Admin: 06/13/17 08:26 Dose:  500 mg


Enalapril Maleate (Vasotec)  10 mg PO DAILY [facility]


   Last Admin: 06/13/17 08:26 Dose:  10 mg


Fenofibrate (Tricor)  145 mg PO [facility]ILY [facility]


   Last Admin: 06/13/17 08:25 Dose:  145 mg


Metoprolol Tartrate (Lopressor)  50 mg PO Q12 [facility]


   Last Admin: 06/13/17 08:26 Dose:  50 mg


Quetiapine Fumarate (Seroquel)  25 mg PO HS [facility]


   Last Admin: 06/12/17 21:05 Dose:  25 mg











- Labs


Labs: 


 





 03/23/17 05:45 





 03/23/17 05:45 





 











PT  11.2 SECONDS (9.4-12.0)   12/14/15  05:20    


 


INR  1.05  (0.89-1.20)   12/14/15  05:20    


 


APTT  36.2 SECONDS (23.1-32.0)  H  12/14/15  05:20    














- Constitutional


Appears: Well, Non-toxic, No Acute Distress





- Head Exam


Head Exam: ATRAUMATIC, NORMAL INSPECTION, NORMOCEPHALIC





- Eye Exam


Eye Exam: EOMI, Normal appearance, PERRL


Pupil Exam: NORMAL ACCOMODATION, PERRL





- ENT Exam


ENT Exam: Mucous Membranes Moist, Normal Exam





- Neck Exam


Neck Exam: Full ROM, Normal Inspection.  absent: Lymphadenopathy





- Respiratory Exam


Respiratory Exam: Clear to Ausculation Bilateral, NORMAL BREATHING PATTERN





- Cardiovascular Exam


Cardiovascular Exam: REGULAR RHYTHM, RRR, +S1, +S2.  absent: Murmur





- GI/Abdominal Exam


GI & Abdominal Exam: Soft, Normal Bowel Sounds.  absent: Tenderness





- Extremities Exam


Extremities Exam: Full ROM, Normal Capillary Refill, Normal Inspection.  absent

: Joint Swelling, Pedal Edema





- Back Exam


Back Exam: NORMAL INSPECTION





- Neurological Exam


Neurological Exam: Alert, Awake, CN II-XII Intact, Normal Gait, Oriented x3





- Psychiatric Exam


Psychiatric exam: Normal Affect, Normal Mood





- Skin


Skin Exam: Dry, Intact, Normal Color, Warm





Assessment and Plan


(1) DVT prophylaxis


Status: Chronic   





(2) Scrotal edema


Status: Chronic   





(3) CAD (coronary artery disease)


Status: Chronic   





(4) Smoking


Status: Chronic   





(5) Low back pain


Status: Chronic   





(6) Prediabetes


Status: Chronic   





(7) Neurocognitive disorder


Status: Chronic

## 2017-06-14 RX ADMIN — DIVALPROEX SODIUM SCH MG: 250 TABLET, DELAYED RELEASE ORAL at 17:12

## 2017-06-14 RX ADMIN — DIVALPROEX SODIUM SCH MG: 250 TABLET, DELAYED RELEASE ORAL at 09:11

## 2017-06-14 NOTE — CP.PCM.PN
Subjective





- Date & Time of Evaluation


Date of Evaluation: 06/14/17


Time of Evaluation: 07:33





- Subjective


Subjective: 





S/O no complaints/distress, no f/c n/v/d. pending placement


A/P cont current plan





Objective





- Vital Signs/Intake and Output


Vital Signs (last 24 hours): 


 











Temp Pulse Resp BP Pulse Ox


 


 97.2 F L  68   20   113/81   99 


 


 06/14/17 00:29  06/14/17 00:29  06/14/17 00:29  06/14/17 00:29  06/14/17 00:29











- Medications


Medications: 


 Current Medications





Atorvastatin Calcium (Lipitor)  20 mg PO [facility]


   Last Admin: 06/13/17 22:11 Dose:  20 mg


Divalproex Sodium (Depakote Dr(*Bid*))  500 mg PO BID [facility]


   Last Admin: 06/13/17 17:04 Dose:  500 mg


Enalapril Maleate (Vasotec)  10 mg PO DAILY [facility]


   Last Admin: 06/13/17 08:26 Dose:  10 mg


Fenofibrate (Tricor)  145 mg PO DAILY [facility]


   Last Admin: 06/13/17 08:25 Dose:  145 mg


Metoprolol Tartrate (Lopressor)  50 mg PO Q12 [facility]


   Last Admin: 06/13/17 22:02 Dose:  50 mg


Quetiapine Fumarate (Seroquel)  25 mg PO HS [facility]


   Last Admin: 06/13/17 22:11 Dose:  25 mg











- Labs


Labs: 


 





 03/23/17 05:45 





 03/23/17 05:45 





 











PT  11.2 SECONDS (9.4-12.0)   12/14/15  05:20    


 


INR  1.05  (0.89-1.20)   12/14/15  05:20    


 


APTT  36.2 SECONDS (23.1-32.0)  H  12/14/15  05:20    














- Constitutional


Appears: Well, Non-toxic, No Acute Distress





- Head Exam


Head Exam: ATRAUMATIC, NORMAL INSPECTION, NORMOCEPHALIC





- Eye Exam


Eye Exam: EOMI, Normal appearance, PERRL


Pupil Exam: NORMAL ACCOMODATION, PERRL





- ENT Exam


ENT Exam: Mucous Membranes Moist, Normal Exam





- Neck Exam


Neck Exam: Full ROM, Normal Inspection.  absent: Lymphadenopathy





- Respiratory Exam


Respiratory Exam: Clear to Ausculation Bilateral, NORMAL BREATHING PATTERN





- Cardiovascular Exam


Cardiovascular Exam: REGULAR RHYTHM, RRR, +S1, +S2.  absent: Murmur





- GI/Abdominal Exam


GI & Abdominal Exam: Soft, Normal Bowel Sounds.  absent: Tenderness





- Extremities Exam


Extremities Exam: Full ROM, Normal Capillary Refill, Normal Inspection.  absent

: Joint Swelling, Pedal Edema





- Back Exam


Back Exam: NORMAL INSPECTION





- Neurological Exam


Neurological Exam: Alert, Awake, CN II-XII Intact, Normal Gait, Oriented x3





- Psychiatric Exam


Psychiatric exam: Normal Affect, Normal Mood





- Skin


Skin Exam: Dry, Intact, Normal Color, Warm





Assessment and Plan


(1) DVT prophylaxis


Status: Chronic   





(2) Scrotal edema


Status: Chronic   





(3) CAD (coronary artery disease)


Status: Chronic   





(4) Smoking


Status: Chronic   





(5) Low back pain


Status: Chronic   





(6) Prediabetes


Status: Chronic   





(7) Neurocognitive disorder


Status: Chronic

## 2017-06-15 RX ADMIN — DIVALPROEX SODIUM SCH MG: 250 TABLET, DELAYED RELEASE ORAL at 09:58

## 2017-06-15 RX ADMIN — DIVALPROEX SODIUM SCH MG: 250 TABLET, DELAYED RELEASE ORAL at 17:59

## 2017-06-15 NOTE — CP.PCM.PN
Subjective





- Date & Time of Evaluation


Date of Evaluation: 06/15/17


Time of Evaluation: 07:13





- Subjective


Subjective: 





S/O no complaints/distress, no f/c n/v/d. pending placement


A/P cont current plan





Objective





- Vital Signs/Intake and Output


Vital Signs (last 24 hours): 


 











Temp Pulse Resp BP Pulse Ox


 


 98.2 F   80   20   110/70   99 


 


 06/15/17 00:18  06/15/17 00:18  06/15/17 00:18  06/15/17 00:18  06/15/17 00:18











- Medications


Medications: 


 Current Medications





Atorvastatin Calcium (Lipitor)  20 mg PO HS [facility]


   Last Admin: 06/14/17 21:20 Dose:  20 mg


Divalproex Sodium (Depakote Dr(*Bid*))  500 mg PO BID [facility]


   Last Admin: 06/14/17 17:12 Dose:  500 mg


Enalapril Maleate (Vasotec)  10 mg PO DAILY [facility]


   Last Admin: 06/14/17 17:12 Dose:  10 mg


Fenofibrate (Tricor)  145 mg PO DAILY [facility]


   Last Admin: 06/14/17 09:12 Dose:  145 mg


Metoprolol Tartrate (Lopressor)  50 mg PO Q12 [facility]


   Last Admin: 06/14/17 21:20 Dose:  50 mg


Quetiapine Fumarate (Seroquel)  25 mg PO HS [facility]


   Last Admin: 06/14/17 21:20 Dose:  25 mg











- Labs


Labs: 


 





 03/23/17 05:45 





 03/23/17 05:45 





 











PT  11.2 SECONDS (9.4-12.0)   12/14/15  05:20    


 


INR  1.05  (0.89-1.20)   12/14/15  05:20    


 


APTT  36.2 SECONDS (23.1-32.0)  H  12/14/15  05:20    














- Constitutional


Appears: Well, Non-toxic, No Acute Distress





- Head Exam


Head Exam: ATRAUMATIC, NORMAL INSPECTION, NORMOCEPHALIC





- Eye Exam


Eye Exam: EOMI, Normal appearance, PERRL


Pupil Exam: NORMAL ACCOMODATION, PERRL





- ENT Exam


ENT Exam: Mucous Membranes Moist, Normal Exam





- Neck Exam


Neck Exam: Full ROM, Normal Inspection.  absent: Lymphadenopathy





- Respiratory Exam


Respiratory Exam: Clear to Ausculation Bilateral, NORMAL BREATHING PATTERN





- Cardiovascular Exam


Cardiovascular Exam: REGULAR RHYTHM, RRR, +S1, +S2.  absent: Murmur





- GI/Abdominal Exam


GI & Abdominal Exam: Soft, Normal Bowel Sounds.  absent: Tenderness





- Extremities Exam


Extremities Exam: Full ROM, Normal Capillary Refill, Normal Inspection.  absent

: Joint Swelling, Pedal Edema





- Back Exam


Back Exam: NORMAL INSPECTION





- Neurological Exam


Neurological Exam: Alert, Awake, CN II-XII Intact, Normal Gait, Oriented x3





- Psychiatric Exam


Psychiatric exam: Normal Affect, Normal Mood





- Skin


Skin Exam: Dry, Intact, Normal Color, Warm





Assessment and Plan


(1) DVT prophylaxis


Status: Chronic   





(2) Scrotal edema


Status: Chronic   





(3) CAD (coronary artery disease)


Status: Chronic   





(4) Smoking


Status: Chronic   





(5) Low back pain


Status: Chronic   





(6) Prediabetes


Status: Chronic   





(7) Neurocognitive disorder


Status: Chronic

## 2017-06-16 RX ADMIN — DIVALPROEX SODIUM SCH MG: 500 TABLET, DELAYED RELEASE ORAL at 18:16

## 2017-06-16 RX ADMIN — DIVALPROEX SODIUM SCH MG: 500 TABLET, DELAYED RELEASE ORAL at 10:45

## 2017-06-17 RX ADMIN — DIVALPROEX SODIUM SCH MG: 500 TABLET, DELAYED RELEASE ORAL at 16:42

## 2017-06-17 RX ADMIN — DIVALPROEX SODIUM SCH MG: 500 TABLET, DELAYED RELEASE ORAL at 08:52

## 2017-06-17 NOTE — CP.PCM.PN
Subjective





- Date & Time of Evaluation


Date of Evaluation: 06/17/17


Time of Evaluation: 09:24





- Subjective


Subjective: 





S/O no complaints/distress, no f/c n/v/d. pending placement


A/P cont current plan





Objective





- Vital Signs/Intake and Output


Vital Signs (last 24 hours): 


 











Temp Pulse Resp BP Pulse Ox


 


 97.8 F   66   20   117/75   98 


 


 06/17/17 08:33  06/17/17 08:33  06/17/17 08:33  06/17/17 08:33  06/17/17 08:33











- Medications


Medications: 


 Current Medications





Atorvastatin Calcium (Lipitor)  20 mg PO HS [facility]


   Last Admin: 06/16/17 21:40 Dose:  20 mg


Divalproex Sodium (Depakote Dr(*Bid*))  500 mg PO BID [facility]


   Last Admin: 06/17/17 08:52 Dose:  500 mg


Enalapril Maleate (Vasotec)  10 mg PO DAILY [facility]


   Last Admin: 06/17/17 08:53 Dose:  10 mg


Fenofibrate (Tricor)  145 mg PO DAILY [facility]


   Last Admin: 06/17/17 08:52 Dose:  145 mg


Metoprolol Tartrate (Lopressor)  50 mg PO Q12 [facility]


   Last Admin: 06/17/17 08:53 Dose:  50 mg


Quetiapine Fumarate (Seroquel)  25 mg PO HS [facility]


   Last Admin: 06/16/17 21:40 Dose:  25 mg











- Labs


Labs: 


 





 03/23/17 05:45 





 03/23/17 05:45 





 











PT  11.2 SECONDS (9.4-12.0)   12/14/15  05:20    


 


INR  1.05  (0.89-1.20)   12/14/15  05:20    


 


APTT  36.2 SECONDS (23.1-32.0)  H  12/14/15  05:20    














- Constitutional


Appears: Well, Non-toxic, No Acute Distress





- Head Exam


Head Exam: ATRAUMATIC, NORMAL INSPECTION, NORMOCEPHALIC





- Eye Exam


Eye Exam: EOMI, Normal appearance, PERRL


Pupil Exam: NORMAL ACCOMODATION, PERRL





- ENT Exam


ENT Exam: Mucous Membranes Moist, Normal Exam





- Neck Exam


Neck Exam: Full ROM, Normal Inspection.  absent: Lymphadenopathy





- Respiratory Exam


Respiratory Exam: Clear to Ausculation Bilateral, NORMAL BREATHING PATTERN





- Cardiovascular Exam


Cardiovascular Exam: REGULAR RHYTHM, RRR, +S1, +S2.  absent: Murmur





- GI/Abdominal Exam


GI & Abdominal Exam: Soft, Normal Bowel Sounds.  absent: Tenderness





- Extremities Exam


Extremities Exam: Full ROM, Normal Capillary Refill, Normal Inspection.  absent

: Joint Swelling, Pedal Edema





- Back Exam


Back Exam: NORMAL INSPECTION





- Neurological Exam


Neurological Exam: Alert, Awake, CN II-XII Intact, Normal Gait, Oriented x3





- Psychiatric Exam


Psychiatric exam: Normal Affect, Normal Mood





- Skin


Skin Exam: Dry, Intact, Normal Color, Warm





Assessment and Plan


(1) DVT prophylaxis


Status: Chronic   





(2) Scrotal edema


Status: Chronic   





(3) CAD (coronary artery disease)


Status: Chronic   





(4) Smoking


Status: Chronic   





(5) Low back pain


Status: Chronic   





(6) Prediabetes


Status: Chronic   





(7) Neurocognitive disorder


Status: Chronic

## 2017-06-18 RX ADMIN — DIVALPROEX SODIUM SCH MG: 500 TABLET, DELAYED RELEASE ORAL at 08:26

## 2017-06-18 RX ADMIN — DIVALPROEX SODIUM SCH MG: 500 TABLET, DELAYED RELEASE ORAL at 16:46

## 2017-06-18 NOTE — CP.PCM.PN
Subjective





- Date & Time of Evaluation


Date of Evaluation: 06/18/17


Time of Evaluation: 12:17





- Subjective


Subjective: 





S/O no complaints/distress, no f/c n/v/d. pending placement


A/P cont current plan





Objective





- Vital Signs/Intake and Output


Vital Signs (last 24 hours): 


 











Temp Pulse Resp BP Pulse Ox


 


 98.4 F   60   20   106/71   98 


 


 06/18/17 08:40  06/18/17 08:40  06/18/17 08:40  06/18/17 08:40  06/18/17 08:40











- Medications


Medications: 


 Current Medications





Atorvastatin Calcium (Lipitor)  20 mg PO [facility]


   Last Admin: 06/17/17 22:31 Dose:  20 mg


Divalproex Sodium (Depakote Dr(*Bid*))  500 mg PO BID [facility]


   Last Admin: 06/18/17 08:26 Dose:  500 mg


Enalapril Maleate (Vasotec)  10 mg PO DAILY [facility]


   Last Admin: 06/18/17 08:27 Dose:  10 mg


Fenofibrate (Tricor)  145 mg PO DAILY [facility]


   Last Admin: 06/18/17 08:26 Dose:  145 mg


Metoprolol Tartrate (Lopressor)  50 mg PO Q12 [facility]


   Last Admin: 06/18/17 08:26 Dose:  50 mg


Quetiapine Fumarate (Seroquel)  25 mg PO HS [facility]


   Last Admin: 06/17/17 22:31 Dose:  25 mg











- Labs


Labs: 


 





 03/23/17 05:45 





 03/23/17 05:45 





 











PT  11.2 SECONDS (9.4-12.0)   12/14/15  05:20    


 


INR  1.05  (0.89-1.20)   12/14/15  05:20    


 


APTT  36.2 SECONDS (23.1-32.0)  H  12/14/15  05:20    














- Constitutional


Appears: Well, Non-toxic, No Acute Distress





- Head Exam


Head Exam: ATRAUMATIC, NORMAL INSPECTION, NORMOCEPHALIC





- Eye Exam


Eye Exam: EOMI, Normal appearance, PERRL


Pupil Exam: NORMAL ACCOMODATION, PERRL





- ENT Exam


ENT Exam: Mucous Membranes Moist, Normal Exam





- Neck Exam


Neck Exam: Full ROM, Normal Inspection.  absent: Lymphadenopathy





- Respiratory Exam


Respiratory Exam: Clear to Ausculation Bilateral, NORMAL BREATHING PATTERN





- Cardiovascular Exam


Cardiovascular Exam: REGULAR RHYTHM, RRR, +S1, +S2.  absent: Murmur





- GI/Abdominal Exam


GI & Abdominal Exam: Soft, Normal Bowel Sounds.  absent: Tenderness





- Extremities Exam


Extremities Exam: Full ROM, Normal Capillary Refill, Normal Inspection.  absent

: Joint Swelling, Pedal Edema





- Back Exam


Back Exam: NORMAL INSPECTION





- Neurological Exam


Neurological Exam: Alert, Awake, CN II-XII Intact, Normal Gait, Oriented x3





- Psychiatric Exam


Psychiatric exam: Normal Affect, Normal Mood





- Skin


Skin Exam: Dry, Intact, Normal Color, Warm





Assessment and Plan


(1) DVT prophylaxis


Status: Chronic   





(2) Scrotal edema


Status: Chronic   





(3) CAD (coronary artery disease)


Status: Chronic   





(4) Smoking


Status: Chronic   





(5) Low back pain


Status: Chronic   





(6) Prediabetes


Status: Chronic   





(7) Neurocognitive disorder


Status: Chronic

## 2017-06-19 RX ADMIN — DIVALPROEX SODIUM SCH MG: 500 TABLET, DELAYED RELEASE ORAL at 16:34

## 2017-06-19 RX ADMIN — DIVALPROEX SODIUM SCH MG: 500 TABLET, DELAYED RELEASE ORAL at 08:38

## 2017-06-19 NOTE — CP.PCM.PN
Subjective





- Date & Time of Evaluation


Date of Evaluation: 06/19/17


Time of Evaluation: 07:31





- Subjective


Subjective: 





S/O no complaints/distress, no f/c n/v/d. pending placement


A/P cont current plan





Objective





- Vital Signs/Intake and Output


Vital Signs (last 24 hours): 


 











Temp Pulse Resp BP Pulse Ox


 


 98.2 F   78   20   110/76   99 


 


 06/19/17 00:39  06/19/17 00:39  06/19/17 00:39  06/19/17 00:39  06/19/17 00:39











- Medications


Medications: 


 Current Medications





Atorvastatin Calcium (Lipitor)  20 mg PO HS [facility]


   Last Admin: 06/18/17 21:21 Dose:  20 mg


Divalproex Sodium (Depakote Dr(*Bid*))  500 mg PO BID [facility]


   Last Admin: 06/18/17 16:46 Dose:  500 mg


Enalapril Maleate (Vasotec)  10 mg PO DAILY [facility]


   Last Admin: 06/18/17 08:27 Dose:  10 mg


Fenofibrate (Tricor)  145 mg PO DAILY [facility]


   Last Admin: 06/18/17 08:26 Dose:  145 mg


Metoprolol Tartrate (Lopressor)  50 mg PO Q12 [facility]


   Last Admin: 06/18/17 21:21 Dose:  50 mg


Quetiapine Fumarate (Seroquel)  25 mg PO HS [facility]


   Last Admin: 06/18/17 21:21 Dose:  25 mg











- Labs


Labs: 


 





 03/23/17 05:45 





 03/23/17 05:45 





 











PT  11.2 SECONDS (9.4-12.0)   12/14/15  05:20    


 


INR  1.05  (0.89-1.20)   12/14/15  05:20    


 


APTT  36.2 SECONDS (23.1-32.0)  H  12/14/15  05:20    














- Constitutional


Appears: Well, Non-toxic, No Acute Distress





- Head Exam


Head Exam: ATRAUMATIC, NORMAL INSPECTION, NORMOCEPHALIC





- Eye Exam


Eye Exam: EOMI, Normal appearance, PERRL


Pupil Exam: NORMAL ACCOMODATION, PERRL





- ENT Exam


ENT Exam: Mucous Membranes Moist, Normal Exam





- Neck Exam


Neck Exam: Full ROM, Normal Inspection.  absent: Lymphadenopathy





- Respiratory Exam


Respiratory Exam: Clear to Ausculation Bilateral, NORMAL BREATHING PATTERN





- Cardiovascular Exam


Cardiovascular Exam: REGULAR RHYTHM, RRR, +S1, +S2.  absent: Murmur





- GI/Abdominal Exam


GI & Abdominal Exam: Soft, Normal Bowel Sounds.  absent: Tenderness





- Extremities Exam


Extremities Exam: Full ROM, Normal Capillary Refill, Normal Inspection.  absent

: Joint Swelling, Pedal Edema





- Back Exam


Back Exam: NORMAL INSPECTION





- Neurological Exam


Neurological Exam: Alert, Awake, CN II-XII Intact, Normal Gait, Oriented x3





- Psychiatric Exam


Psychiatric exam: Normal Affect, Normal Mood





- Skin


Skin Exam: Dry, Intact, Normal Color, Warm





Assessment and Plan


(1) DVT prophylaxis


Status: Chronic   





(2) Scrotal edema


Status: Chronic   





(3) CAD (coronary artery disease)


Status: Chronic   





(4) Smoking


Status: Chronic   





(5) Low back pain


Status: Chronic   





(6) Prediabetes


Status: Chronic   





(7) Neurocognitive disorder


Status: Chronic

## 2017-06-20 RX ADMIN — DIVALPROEX SODIUM SCH MG: 500 TABLET, DELAYED RELEASE ORAL at 16:28

## 2017-06-20 RX ADMIN — DIVALPROEX SODIUM SCH MG: 500 TABLET, DELAYED RELEASE ORAL at 08:28

## 2017-06-20 NOTE — CP.PCM.PN
Subjective





- Date & Time of Evaluation


Date of Evaluation: 06/20/17


Time of Evaluation: 07:40





- Subjective


Subjective: 





S/O no complaints/distress, no f/c n/v/d. pending placement


A/P cont current plan





Objective





- Vital Signs/Intake and Output


Vital Signs (last 24 hours): 


 











Temp Pulse Resp BP Pulse Ox


 


 98.5 F   63   18   106/72   95 


 


 06/20/17 00:37  06/20/17 00:37  06/20/17 00:37  06/20/17 00:37  06/20/17 00:37











- Medications


Medications: 


 Current Medications





Atorvastatin Calcium (Lipitor)  20 mg PO HS [facility]


   Last Admin: 06/19/17 21:22 Dose:  20 mg


Divalproex Sodium (Depakote Dr(*Bid*))  500 mg PO BID [facility]


   Last Admin: 06/19/17 16:34 Dose:  500 mg


Enalapril Maleate (Vasotec)  10 mg PO DAILY [facility]


   Last Admin: 06/19/17 08:39 Dose:  10 mg


Fenofibrate (Tricor)  145 mg PO DAILY [facility]


   Last Admin: 06/19/17 08:39 Dose:  145 mg


Metoprolol Tartrate (Lopressor)  50 mg PO Q12 [facility]


   Last Admin: 06/19/17 21:22 Dose:  50 mg


Quetiapine Fumarate (Seroquel)  25 mg PO HS [facility]


   Last Admin: 06/19/17 21:22 Dose:  25 mg











- Labs


Labs: 


 





 03/23/17 05:45 





 03/23/17 05:45 





 











PT  11.2 SECONDS (9.4-12.0)   12/14/15  05:20    


 


INR  1.05  (0.89-1.20)   12/14/15  05:20    


 


APTT  36.2 SECONDS (23.1-32.0)  H  12/14/15  05:20    














- Constitutional


Appears: Well, Non-toxic, No Acute Distress





- Head Exam


Head Exam: ATRAUMATIC, NORMAL INSPECTION, NORMOCEPHALIC





- Eye Exam


Eye Exam: EOMI, Normal appearance, PERRL


Pupil Exam: NORMAL ACCOMODATION, PERRL





- ENT Exam


ENT Exam: Mucous Membranes Moist, Normal Exam





- Neck Exam


Neck Exam: Full ROM, Normal Inspection.  absent: Lymphadenopathy





- Respiratory Exam


Respiratory Exam: Clear to Ausculation Bilateral, NORMAL BREATHING PATTERN





- Cardiovascular Exam


Cardiovascular Exam: REGULAR RHYTHM, RRR, +S1, +S2.  absent: Murmur





- GI/Abdominal Exam


GI & Abdominal Exam: Soft, Normal Bowel Sounds.  absent: Tenderness





- Extremities Exam


Extremities Exam: Full ROM, Normal Capillary Refill, Normal Inspection.  absent

: Joint Swelling, Pedal Edema





- Back Exam


Back Exam: NORMAL INSPECTION





- Neurological Exam


Neurological Exam: Alert, Awake, CN II-XII Intact, Normal Gait, Oriented x3





- Psychiatric Exam


Psychiatric exam: Normal Affect, Normal Mood





- Skin


Skin Exam: Dry, Intact, Normal Color, Warm





Assessment and Plan


(1) DVT prophylaxis


Status: Chronic   





(2) Scrotal edema


Status: Chronic   





(3) CAD (coronary artery disease)


Status: Chronic   





(4) Smoking


Status: Chronic   





(5) Low back pain


Status: Chronic   





(6) Prediabetes


Status: Chronic   





(7) Neurocognitive disorder


Status: Chronic

## 2017-06-21 RX ADMIN — DIVALPROEX SODIUM SCH MG: 500 TABLET, DELAYED RELEASE ORAL at 09:23

## 2017-06-21 RX ADMIN — DIVALPROEX SODIUM SCH MG: 500 TABLET, DELAYED RELEASE ORAL at 16:32

## 2017-06-21 NOTE — CP.PCM.PN
Subjective





- Date & Time of Evaluation


Date of Evaluation: 06/21/17


Time of Evaluation: 07:16





- Subjective


Subjective: 





S/O no complaints/distress, no f/c n/v/d. pending placement


A/P cont current plan





Objective





- Vital Signs/Intake and Output


Vital Signs (last 24 hours): 


 











Temp Pulse Resp BP Pulse Ox


 


 98.2 F   68   20   111/64   96 


 


 06/21/17 00:24  06/21/17 00:24  06/21/17 00:24  06/21/17 00:24  06/21/17 00:24











- Medications


Medications: 


 Current Medications





Atorvastatin Calcium (Lipitor)  20 mg PO [facility]


   Last Admin: 06/20/17 21:08 Dose:  20 mg


Divalproex Sodium (Depakote Dr(*Bid*))  500 mg PO BID [facility]


   Last Admin: 06/20/17 16:28 Dose:  500 mg


Enalapril Maleate (Vasotec)  10 mg PO DAILY [facility]


   Last Admin: 06/20/17 08:29 Dose:  Not Given


Fenofibrate (Tricor)  145 mg PO DAILY [facility]


   Last Admin: 06/20/17 08:29 Dose:  145 mg


Metoprolol Tartrate (Lopressor)  50 mg PO Q12 [facility]


   Last Admin: 06/20/17 21:06 Dose:  50 mg


Quetiapine Fumarate (Seroquel)  25 mg PO HS [facility]


   Last Admin: 06/20/17 21:08 Dose:  25 mg











- Labs


Labs: 


 





 03/23/17 05:45 





 03/23/17 05:45 





 











PT  11.2 SECONDS (9.4-12.0)   12/14/15  05:20    


 


INR  1.05  (0.89-1.20)   12/14/15  05:20    


 


APTT  36.2 SECONDS (23.1-32.0)  H  12/14/15  05:20    














- Constitutional


Appears: Well, Non-toxic, No Acute Distress





- Head Exam


Head Exam: ATRAUMATIC, NORMAL INSPECTION, NORMOCEPHALIC





- Eye Exam


Eye Exam: EOMI, Normal appearance, PERRL


Pupil Exam: NORMAL ACCOMODATION, PERRL





- ENT Exam


ENT Exam: Mucous Membranes Moist, Normal Exam





- Neck Exam


Neck Exam: Full ROM, Normal Inspection.  absent: Lymphadenopathy





- Respiratory Exam


Respiratory Exam: Clear to Ausculation Bilateral, NORMAL BREATHING PATTERN





- Cardiovascular Exam


Cardiovascular Exam: REGULAR RHYTHM, RRR, +S1, +S2.  absent: Murmur





- GI/Abdominal Exam


GI & Abdominal Exam: Soft, Normal Bowel Sounds.  absent: Tenderness





- Extremities Exam


Extremities Exam: Full ROM, Normal Capillary Refill, Normal Inspection.  absent

: Joint Swelling, Pedal Edema





- Back Exam


Back Exam: NORMAL INSPECTION





- Neurological Exam


Neurological Exam: Alert, Awake, CN II-XII Intact, Normal Gait, Oriented x3





- Psychiatric Exam


Psychiatric exam: Normal Affect, Normal Mood





- Skin


Skin Exam: Dry, Intact, Normal Color, Warm





Assessment and Plan


(1) DVT prophylaxis


Status: Chronic   





(2) Scrotal edema


Status: Chronic   





(3) CAD (coronary artery disease)


Status: Chronic   





(4) Smoking


Status: Chronic   





(5) Low back pain


Status: Chronic   





(6) Prediabetes


Status: Chronic   





(7) Neurocognitive disorder


Status: Chronic

## 2017-06-22 RX ADMIN — DIVALPROEX SODIUM SCH MG: 500 TABLET, DELAYED RELEASE ORAL at 08:53

## 2017-06-22 RX ADMIN — DIVALPROEX SODIUM SCH MG: 500 TABLET, DELAYED RELEASE ORAL at 17:23

## 2017-06-22 NOTE — CP.PCM.PN
Subjective





- Date & Time of Evaluation


Date of Evaluation: 06/22/17


Time of Evaluation: 06:39





- Subjective


Subjective: 





S/O no complaints/distress, no f/c n/v/d. pending placement


A/P cont current plan





Objective





- Vital Signs/Intake and Output


Vital Signs (last 24 hours): 


 











Temp Pulse Resp BP Pulse Ox


 


 98.7 F   67   20   108/71   99 


 


 06/22/17 00:12  06/22/17 00:12  06/22/17 00:12  06/22/17 00:12  06/22/17 00:12











- Medications


Medications: 


 Current Medications





Atorvastatin Calcium (Lipitor)  20 mg PO HS [facility]


   Last Admin: 06/21/17 21:22 Dose:  20 mg


Divalproex Sodium (Depakote Dr(*Bid*))  500 mg PO BID [facility]


   Last Admin: 06/21/17 16:32 Dose:  500 mg


Enalapril Maleate (Vasotec)  10 mg PO DAILY [facility]


   Last Admin: 06/21/17 09:24 Dose:  10 mg


Fenofibrate (Tricor)  145 mg PO DAILY [facility]


   Last Admin: 06/21/17 09:24 Dose:  145 mg


Metoprolol Tartrate (Lopressor)  50 mg PO Q12 [facility]


   Last Admin: 06/21/17 21:19 Dose:  50 mg


Quetiapine Fumarate (Seroquel)  25 mg PO HS [facility]


   Last Admin: 06/21/17 21:22 Dose:  25 mg











- Labs


Labs: 


 





 03/23/17 05:45 





 03/23/17 05:45 





 











PT  11.2 SECONDS (9.4-12.0)   12/14/15  05:20    


 


INR  1.05  (0.89-1.20)   12/14/15  05:20    


 


APTT  36.2 SECONDS (23.1-32.0)  H  12/14/15  05:20    














- Constitutional


Appears: Well, Non-toxic, No Acute Distress





- Head Exam


Head Exam: ATRAUMATIC, NORMAL INSPECTION, NORMOCEPHALIC





- Eye Exam


Eye Exam: EOMI, Normal appearance, PERRL


Pupil Exam: NORMAL ACCOMODATION, PERRL





- ENT Exam


ENT Exam: Mucous Membranes Moist, Normal Exam





- Neck Exam


Neck Exam: Full ROM, Normal Inspection.  absent: Lymphadenopathy





- Respiratory Exam


Respiratory Exam: Clear to Ausculation Bilateral, NORMAL BREATHING PATTERN





- Cardiovascular Exam


Cardiovascular Exam: REGULAR RHYTHM, RRR, +S1, +S2.  absent: Murmur





- GI/Abdominal Exam


GI & Abdominal Exam: Soft, Normal Bowel Sounds.  absent: Tenderness





- Extremities Exam


Extremities Exam: Full ROM, Normal Capillary Refill, Normal Inspection.  absent

: Joint Swelling, Pedal Edema





- Back Exam


Back Exam: NORMAL INSPECTION





- Neurological Exam


Neurological Exam: Alert, Awake, CN II-XII Intact, Normal Gait, Oriented x3





- Psychiatric Exam


Psychiatric exam: Normal Affect, Normal Mood





- Skin


Skin Exam: Dry, Intact, Normal Color, Warm





Assessment and Plan


(1) DVT prophylaxis


Status: Chronic   





(2) Scrotal edema


Status: Chronic   





(3) CAD (coronary artery disease)


Status: Chronic   





(4) Smoking


Status: Chronic   





(5) Low back pain


Status: Chronic   





(6) Prediabetes


Status: Chronic   





(7) Neurocognitive disorder


Status: Chronic

## 2017-06-23 RX ADMIN — DIVALPROEX SODIUM SCH MG: 500 TABLET, DELAYED RELEASE ORAL at 09:45

## 2017-06-23 RX ADMIN — DIVALPROEX SODIUM SCH MG: 500 TABLET, DELAYED RELEASE ORAL at 17:19

## 2017-06-23 NOTE — CP.PCM.PN
Subjective





- Date & Time of Evaluation


Date of Evaluation: 06/23/17


Time of Evaluation: 10:26





- Subjective


Subjective: 





S/O no complaints/distress, no f/c n/v/d. pending placement


A/P cont current plan





Objective





- Vital Signs/Intake and Output


Vital Signs (last 24 hours): 


 











Temp Pulse Resp BP Pulse Ox


 


 98 F   63   20   102/67   99 


 


 06/23/17 09:00  06/23/17 09:00  06/23/17 09:00  06/23/17 09:45  06/23/17 09:00











- Medications


Medications: 


 Current Medications





Atorvastatin Calcium (Lipitor)  20 mg PO [facility]


   Last Admin: 06/22/17 22:00 Dose:  20 mg


Divalproex Sodium (Depakote Dr(*Bid*))  500 mg PO BID [facility]


   Last Admin: 06/23/17 09:45 Dose:  500 mg


Enalapril Maleate (Vasotec)  10 mg PO DAILY [facility]


   Last Admin: 06/22/17 08:54 Dose:  10 mg


Fenofibrate (Tricor)  145 mg PO DAILY [facility]


   Last Admin: 06/23/17 09:45 Dose:  145 mg


Metoprolol Tartrate (Lopressor)  50 mg PO Q12 [facility]


   Last Admin: 06/23/17 09:45 Dose:  Not Given


Quetiapine Fumarate (Seroquel)  25 mg PO HS [facility]


   Last Admin: 06/22/17 22:01 Dose:  25 mg











- Labs


Labs: 


 





 03/23/17 05:45 





 03/23/17 05:45 





 











PT  11.2 SECONDS (9.4-12.0)   12/14/15  05:20    


 


INR  1.05  (0.89-1.20)   12/14/15  05:20    


 


APTT  36.2 SECONDS (23.1-32.0)  H  12/14/15  05:20    














- Constitutional


Appears: Well, Non-toxic, No Acute Distress





- Head Exam


Head Exam: ATRAUMATIC, NORMAL INSPECTION, NORMOCEPHALIC





- Eye Exam


Eye Exam: EOMI, Normal appearance, PERRL


Pupil Exam: NORMAL ACCOMODATION, PERRL





- ENT Exam


ENT Exam: Mucous Membranes Moist, Normal Exam





- Neck Exam


Neck Exam: Full ROM, Normal Inspection.  absent: Lymphadenopathy





- Respiratory Exam


Respiratory Exam: Clear to Ausculation Bilateral, NORMAL BREATHING PATTERN





- Cardiovascular Exam


Cardiovascular Exam: REGULAR RHYTHM, RRR, +S1, +S2.  absent: Murmur





- GI/Abdominal Exam


GI & Abdominal Exam: Soft, Normal Bowel Sounds.  absent: Tenderness





- Extremities Exam


Extremities Exam: Full ROM, Normal Capillary Refill, Normal Inspection.  absent

: Joint Swelling, Pedal Edema





- Back Exam


Back Exam: NORMAL INSPECTION





- Neurological Exam


Neurological Exam: Alert, Awake, CN II-XII Intact, Normal Gait, Oriented x3





- Psychiatric Exam


Psychiatric exam: Normal Affect, Normal Mood





- Skin


Skin Exam: Dry, Intact, Normal Color, Warm





Assessment and Plan


(1) DVT prophylaxis


Status: Chronic   





(2) Scrotal edema


Status: Chronic   





(3) CAD (coronary artery disease)


Status: Chronic   





(4) Smoking


Status: Chronic   





(5) Low back pain


Status: Chronic   





(6) Prediabetes


Status: Chronic   





(7) Neurocognitive disorder


Status: Chronic

## 2017-06-24 RX ADMIN — DIVALPROEX SODIUM SCH MG: 500 TABLET, DELAYED RELEASE ORAL at 08:53

## 2017-06-24 RX ADMIN — DIVALPROEX SODIUM SCH MG: 500 TABLET, DELAYED RELEASE ORAL at 17:01

## 2017-06-24 NOTE — CP.PCM.PN
Subjective





- Date & Time of Evaluation


Date of Evaluation: 06/24/17


Time of Evaluation: 09:17





- Subjective


Subjective: 





S/O no complaints/distress, no f/c n/v/d. pending placement


A/P cont current plan





Objective





- Vital Signs/Intake and Output


Vital Signs (last 24 hours): 


 











Temp Pulse Resp BP Pulse Ox


 


 98.0 F   72   17   102/69   95 


 


 06/24/17 08:01  06/24/17 08:01  06/24/17 08:01  06/24/17 08:54  06/24/17 08:01











- Medications


Medications: 


 Current Medications





Atorvastatin Calcium (Lipitor)  20 mg PO [facility]


   Last Admin: 06/23/17 21:59 Dose:  20 mg


Divalproex Sodium (Depakote Dr(*Bid*))  500 mg PO BID [facility]


   Last Admin: 06/24/17 08:53 Dose:  500 mg


Enalapril Maleate (Vasotec)  10 mg PO DAILY [facility]


   Last Admin: 06/23/17 17:19 Dose:  10 mg


Fenofibrate (Tricor)  145 mg PO DAILY [facility]


   Last Admin: 06/24/17 08:54 Dose:  145 mg


Metoprolol Tartrate (Lopressor)  50 mg PO Q12 [facility]


   Last Admin: 06/24/17 08:54 Dose:  Not Given


Quetiapine Fumarate (Seroquel)  25 mg PO HS [facility]


   Last Admin: 06/23/17 21:59 Dose:  25 mg











- Labs


Labs: 


 





 03/23/17 05:45 





 03/23/17 05:45 





 











PT  11.2 SECONDS (9.4-12.0)   12/14/15  05:20    


 


INR  1.05  (0.89-1.20)   12/14/15  05:20    


 


APTT  36.2 SECONDS (23.1-32.0)  H  12/14/15  05:20    














- Constitutional


Appears: Well, Non-toxic, No Acute Distress





- Head Exam


Head Exam: ATRAUMATIC, NORMAL INSPECTION, NORMOCEPHALIC





- Eye Exam


Eye Exam: EOMI, Normal appearance, PERRL


Pupil Exam: NORMAL ACCOMODATION, PERRL





- ENT Exam


ENT Exam: Mucous Membranes Moist, Normal Exam





- Neck Exam


Neck Exam: Full ROM, Normal Inspection.  absent: Lymphadenopathy





- Respiratory Exam


Respiratory Exam: Clear to Ausculation Bilateral, NORMAL BREATHING PATTERN





- Cardiovascular Exam


Cardiovascular Exam: REGULAR RHYTHM, +S1, +S2.  absent: Murmur





- GI/Abdominal Exam


GI & Abdominal Exam: Soft, Normal Bowel Sounds.  absent: Tenderness





- Extremities Exam


Extremities Exam: Full ROM, Normal Capillary Refill, Normal Inspection.  absent

: Joint Swelling, Pedal Edema





- Back Exam


Back Exam: NORMAL INSPECTION





- Neurological Exam


Neurological Exam: Alert, Awake, CN II-XII Intact, Normal Gait, Oriented x3





- Psychiatric Exam


Psychiatric exam: Normal Affect, Normal Mood





- Skin


Skin Exam: Dry, Intact, Normal Color, Warm





Assessment and Plan


(1) DVT prophylaxis


Status: Chronic   





(2) Scrotal edema


Status: Chronic   





(3) CAD (coronary artery disease)


Status: Chronic   





(4) Smoking


Status: Chronic   





(5) Low back pain


Status: Chronic   





(6) Prediabetes


Status: Chronic   





(7) Neurocognitive disorder


Status: Chronic

## 2017-06-25 RX ADMIN — DIVALPROEX SODIUM SCH MG: 500 TABLET, DELAYED RELEASE ORAL at 16:48

## 2017-06-25 RX ADMIN — DIVALPROEX SODIUM SCH MG: 500 TABLET, DELAYED RELEASE ORAL at 09:53

## 2017-06-25 NOTE — CP.PCM.PN
Subjective





- Date & Time of Evaluation


Date of Evaluation: 06/25/17


Time of Evaluation: 10:46





- Subjective


Subjective: 





S/O no complaints/distress, no f/c n/v/d. pending placement


A/P cont current plan





Objective





- Vital Signs/Intake and Output


Vital Signs (last 24 hours): 


 











Temp Pulse Resp BP Pulse Ox


 


 98.2 F   67   18   96/66 L  96 


 


 06/25/17 08:01  06/25/17 09:53  06/25/17 08:01  06/25/17 09:53  06/25/17 08:01











- Medications


Medications: 


 Current Medications





Atorvastatin Calcium (Lipitor)  20 mg PO [facility]


   Last Admin: 06/24/17 21:42 Dose:  20 mg


Divalproex Sodium (Depakote Dr(*Bid*))  500 mg PO BID [facility]


   Last Admin: 06/25/17 09:53 Dose:  500 mg


Enalapril Maleate (Vasotec)  10 mg PO DAILY [facility]


   Last Admin: 06/25/17 09:54 Dose:  Not Given


Fenofibrate (Tricor)  145 mg PO DAILY [facility]


   Last Admin: 06/25/17 09:53 Dose:  145 mg


Metoprolol Tartrate (Lopressor)  50 mg PO Q12 [facility]


   Last Admin: 06/25/17 09:53 Dose:  Not Given


Quetiapine Fumarate (Seroquel)  25 mg PO HS [facility]


   Last Admin: 06/24/17 21:42 Dose:  25 mg











- Labs


Labs: 


 





 03/23/17 05:45 





 03/23/17 05:45 





 











PT  11.2 SECONDS (9.4-12.0)   12/14/15  05:20    


 


INR  1.05  (0.89-1.20)   12/14/15  05:20    


 


APTT  36.2 SECONDS (23.1-32.0)  H  12/14/15  05:20    














- Constitutional


Appears: Well, Non-toxic, No Acute Distress





- Head Exam


Head Exam: ATRAUMATIC, NORMAL INSPECTION, NORMOCEPHALIC





- Eye Exam


Eye Exam: EOMI, Normal appearance, PERRL


Pupil Exam: NORMAL ACCOMODATION, PERRL





- ENT Exam


ENT Exam: Mucous Membranes Moist, Normal Exam





- Neck Exam


Neck Exam: Full ROM, Normal Inspection.  absent: Lymphadenopathy





- Respiratory Exam


Respiratory Exam: Clear to Ausculation Bilateral, NORMAL BREATHING PATTERN





- Cardiovascular Exam


Cardiovascular Exam: REGULAR RHYTHM, RRR, +S1, +S2.  absent: Murmur





- GI/Abdominal Exam


GI & Abdominal Exam: Soft, Normal Bowel Sounds.  absent: Tenderness





- Extremities Exam


Extremities Exam: Full ROM, Normal Capillary Refill, Normal Inspection.  absent

: Joint Swelling, Pedal Edema





- Back Exam


Back Exam: NORMAL INSPECTION





- Neurological Exam


Neurological Exam: Alert, Awake, CN II-XII Intact, Normal Gait, Oriented x3





- Psychiatric Exam


Psychiatric exam: Normal Affect, Normal Mood





- Skin


Skin Exam: Dry, Intact, Normal Color, Warm





Assessment and Plan


(1) DVT prophylaxis


Status: Chronic   





(2) Scrotal edema


Status: Chronic   





(3) CAD (coronary artery disease)


Status: Chronic   





(4) Smoking


Status: Chronic   





(5) Low back pain


Status: Chronic   





(6) Prediabetes


Status: Chronic   





(7) Neurocognitive disorder


Status: Chronic

## 2017-06-26 RX ADMIN — DIVALPROEX SODIUM SCH MG: 500 TABLET, DELAYED RELEASE ORAL at 17:28

## 2017-06-26 RX ADMIN — DIVALPROEX SODIUM SCH MG: 500 TABLET, DELAYED RELEASE ORAL at 09:55

## 2017-06-26 NOTE — CP.PCM.PN
Subjective





- Date & Time of Evaluation


Date of Evaluation: 06/26/17


Time of Evaluation: 11:19





- Subjective


Subjective: 





S/O no complaints/distress, no f/c n/v/d. pending placement


A/P cont current plan





Objective





- Vital Signs/Intake and Output


Vital Signs (last 24 hours): 


 











Temp Pulse Resp BP Pulse Ox


 


 98.5 F   77   20   129/72   97 


 


 06/26/17 08:50  06/26/17 09:55  06/26/17 08:50  06/26/17 09:55  06/26/17 08:50











- Medications


Medications: 


 Current Medications





Atorvastatin Calcium (Lipitor)  20 mg PO HS [facility]


   Last Admin: 06/25/17 22:35 Dose:  20 mg


Divalproex Sodium (Depakote Dr(*Bid*))  500 mg PO BID [facility]


   Last Admin: 06/26/17 09:55 Dose:  500 mg


Enalapril Maleate (Vasotec)  10 mg PO DAILY [facility]


   Last Admin: 06/26/17 09:55 Dose:  10 mg


Fenofibrate (Tricor)  145 mg PO DAILY [facility]


   Last Admin: 06/26/17 09:55 Dose:  145 mg


Metoprolol Tartrate (Lopressor)  50 mg PO Q12 [facility]


   Last Admin: 06/26/17 09:55 Dose:  50 mg


Quetiapine Fumarate (Seroquel)  25 mg PO HS [facility]


   Last Admin: 06/25/17 22:40 Dose:  25 mg











- Labs


Labs: 


 





 03/23/17 05:45 





 03/23/17 05:45 





 











PT  11.2 SECONDS (9.4-12.0)   12/14/15  05:20    


 


INR  1.05  (0.89-1.20)   12/14/15  05:20    


 


APTT  36.2 SECONDS (23.1-32.0)  H  12/14/15  05:20    














- Constitutional


Appears: Well, Non-toxic, No Acute Distress





- Head Exam


Head Exam: ATRAUMATIC, NORMAL INSPECTION, NORMOCEPHALIC





- Eye Exam


Eye Exam: EOMI, Normal appearance, PERRL


Pupil Exam: NORMAL ACCOMODATION, PERRL





- ENT Exam


ENT Exam: Mucous Membranes Moist, Normal Exam





- Neck Exam


Neck Exam: Full ROM, Normal Inspection.  absent: Lymphadenopathy





- Respiratory Exam


Respiratory Exam: Clear to Ausculation Bilateral, NORMAL BREATHING PATTERN





- Cardiovascular Exam


Cardiovascular Exam: REGULAR RHYTHM, RRR, +S1, +S2.  absent: Murmur





- GI/Abdominal Exam


GI & Abdominal Exam: Soft, Normal Bowel Sounds.  absent: Tenderness





- Extremities Exam


Extremities Exam: Full ROM, Normal Capillary Refill, Normal Inspection.  absent

: Joint Swelling, Pedal Edema





- Back Exam


Back Exam: NORMAL INSPECTION





- Neurological Exam


Neurological Exam: Alert, Awake, CN II-XII Intact, Normal Gait, Oriented x3





- Psychiatric Exam


Psychiatric exam: Normal Affect, Normal Mood





- Skin


Skin Exam: Dry, Intact, Normal Color, Warm





Assessment and Plan


(1) DVT prophylaxis


Status: Chronic   





(2) Scrotal edema


Status: Chronic   





(3) CAD (coronary artery disease)


Status: Chronic   





(4) Smoking


Status: Chronic   





(5) Low back pain


Status: Chronic   





(6) Prediabetes


Status: Chronic   





(7) Neurocognitive disorder


Status: Chronic

## 2017-06-27 RX ADMIN — DIVALPROEX SODIUM SCH MG: 500 TABLET, DELAYED RELEASE ORAL at 09:49

## 2017-06-27 RX ADMIN — DIVALPROEX SODIUM SCH MG: 500 TABLET, DELAYED RELEASE ORAL at 16:41

## 2017-06-27 NOTE — CP.PCM.PN
Subjective





- Date & Time of Evaluation


Date of Evaluation: 06/27/17


Time of Evaluation: 07:17





- Subjective


Subjective: 





S/O no complaints/distress, no f/c n/v/d. pending placement


A/P cont current plan





Objective





- Vital Signs/Intake and Output


Vital Signs (last 24 hours): 


 











Temp Pulse Resp BP Pulse Ox


 


 98.3 F   71   20   103/66   97 


 


 06/27/17 00:03  06/27/17 00:03  06/27/17 00:03  06/27/17 00:03  06/27/17 00:03











- Medications


Medications: 


 Current Medications





Atorvastatin Calcium (Lipitor)  [facility]


   Last Admin: 06/26/17 21:45 Dose:  20 mg


Divalproex Sodium (Depakote Dr(*Bid*))  500 mg PO BID [facility]


   Last Admin: 06/26/17 17:28 Dose:  500 mg


Enalapril Maleate (Vasotec)  10 mg PO DAILY [facility]


   Last Admin: 06/26/17 09:55 Dose:  10 mg


Fenofibrate (Tricor)  145 mg PO [facility]ILY [facility]


   Last Admin: 06/26/17 09:55 Dose:  145 mg


Metoprolol Tartrate (Lopressor)  50 mg PO Q12 [facility]


   Last Admin: 06/26/17 21:45 Dose:  50 mg


Quetiapine Fumarate (Seroquel)  25 mg PO HS [facility]


   Last Admin: 06/26/17 21:45 Dose:  25 mg











- Labs


Labs: 


 





 03/23/17 05:45 





 03/23/17 05:45 





 











PT  11.2 SECONDS (9.4-12.0)   12/14/15  05:20    


 


INR  1.05  (0.89-1.20)   12/14/15  05:20    


 


APTT  36.2 SECONDS (23.1-32.0)  H  12/14/15  05:20    














- Constitutional


Appears: Well, Non-toxic, No Acute Distress





- Head Exam


Head Exam: ATRAUMATIC, NORMAL INSPECTION, NORMOCEPHALIC





- Eye Exam


Eye Exam: EOMI, Normal appearance, PERRL


Pupil Exam: NORMAL ACCOMODATION, PERRL





- ENT Exam


ENT Exam: Mucous Membranes Moist, Normal Exam





- Neck Exam


Neck Exam: Full ROM, Normal Inspection.  absent: Lymphadenopathy





- Respiratory Exam


Respiratory Exam: Clear to Ausculation Bilateral, NORMAL BREATHING PATTERN





- Cardiovascular Exam


Cardiovascular Exam: REGULAR RHYTHM, RRR, +S1, +S2.  absent: Murmur





- GI/Abdominal Exam


GI & Abdominal Exam: Soft, Normal Bowel Sounds.  absent: Tenderness





- Extremities Exam


Extremities Exam: Full ROM, Normal Capillary Refill, Normal Inspection.  absent

: Joint Swelling, Pedal Edema





- Back Exam


Back Exam: NORMAL INSPECTION





- Neurological Exam


Neurological Exam: Alert, Awake, CN II-XII Intact, Normal Gait, Oriented x3





- Psychiatric Exam


Psychiatric exam: Normal Affect, Normal Mood





- Skin


Skin Exam: Dry, Intact, Normal Color, Warm





Assessment and Plan


(1) DVT prophylaxis


Status: Chronic   





(2) Scrotal edema


Status: Chronic   





(3) CAD (coronary artery disease)


Status: Chronic   





(4) Smoking


Status: Chronic   





(5) Low back pain


Status: Chronic   





(6) Prediabetes


Status: Chronic   





(7) Neurocognitive disorder


Status: Chronic

## 2017-06-28 RX ADMIN — DIVALPROEX SODIUM SCH MG: 500 TABLET, DELAYED RELEASE ORAL at 16:29

## 2017-06-28 RX ADMIN — DIVALPROEX SODIUM SCH MG: 500 TABLET, DELAYED RELEASE ORAL at 08:44

## 2017-06-28 NOTE — CP.PCM.PN
Subjective





- Date & Time of Evaluation


Date of Evaluation: 06/28/17


Time of Evaluation: 07:21





- Subjective


Subjective: 





S/O no complaints/distress, no f/c n/v/d. pending placement


A/P cont current plan





Objective





- Vital Signs/Intake and Output


Vital Signs (last 24 hours): 


 











Temp Pulse Resp BP Pulse Ox


 


 97.7 F   70   18   105/68   96 


 


 06/28/17 00:49  06/28/17 00:49  06/28/17 00:49  06/28/17 00:49  06/28/17 00:49











- Medications


Medications: 


 Current Medications





Atorvastatin Calcium (Lipitor)  [facility] PO HS [facility]


   Last Admin: 06/27/17 21:31 Dose:  20 mg


Divalproex Sodium (Depakote Dr(*Bid*))  500 mg PO BID [facility]


   Last Admin: 06/27/17 16:41 Dose:  500 mg


Enalapril Maleate (Vasotec)  10 mg PO DAILY [facility]


   Last Admin: 06/27/17 09:49 Dose:  10 mg


Fenofibrate (Tricor)  145 mg PO DAILY [facility]


   Last Admin: 06/27/17 09:49 Dose:  145 mg


Metoprolol Tartrate (Lopressor)  50 mg PO Q12 [facility]


   Last Admin: 06/27/17 21:31 Dose:  50 mg


Quetiapine Fumarate (Seroquel)  25 mg PO HS [facility]


   Last Admin: 06/27/17 21:32 Dose:  25 mg











- Labs


Labs: 


 





 03/23/17 05:45 





 03/23/17 05:45 





 











PT  11.2 SECONDS (9.4-12.0)   12/14/15  05:20    


 


INR  1.05  (0.89-1.20)   12/14/15  05:20    


 


APTT  36.2 SECONDS (23.1-32.0)  H  12/14/15  05:20    














- Constitutional


Appears: Well, Non-toxic, No Acute Distress





- Head Exam


Head Exam: ATRAUMATIC, NORMAL INSPECTION, NORMOCEPHALIC





- Eye Exam


Eye Exam: EOMI, Normal appearance, PERRL


Pupil Exam: NORMAL ACCOMODATION, PERRL





- ENT Exam


ENT Exam: Mucous Membranes Moist, Normal Exam





- Neck Exam


Neck Exam: Full ROM, Normal Inspection.  absent: Lymphadenopathy





- Respiratory Exam


Respiratory Exam: Clear to Ausculation Bilateral, NORMAL BREATHING PATTERN





- Cardiovascular Exam


Cardiovascular Exam: REGULAR RHYTHM, RRR, +S1, +S2.  absent: Murmur





- GI/Abdominal Exam


GI & Abdominal Exam: Soft, Normal Bowel Sounds.  absent: Tenderness





- Extremities Exam


Extremities Exam: Full ROM, Normal Capillary Refill, Normal Inspection.  absent

: Joint Swelling, Pedal Edema





- Back Exam


Back Exam: NORMAL INSPECTION





- Neurological Exam


Neurological Exam: Alert, Awake, CN II-XII Intact, Normal Gait, Oriented x3





- Psychiatric Exam


Psychiatric exam: Normal Affect, Normal Mood





- Skin


Skin Exam: Dry, Intact, Normal Color, Warm





Assessment and Plan


(1) DVT prophylaxis


Status: Chronic   





(2) Scrotal edema


Status: Chronic   





(3) CAD (coronary artery disease)


Status: Chronic   





(4) Smoking


Status: Chronic   





(5) Low back pain


Status: Chronic   





(6) Prediabetes


Status: Chronic   





(7) Neurocognitive disorder


Status: Chronic

## 2017-06-29 RX ADMIN — DIVALPROEX SODIUM SCH MG: 500 TABLET, DELAYED RELEASE ORAL at 16:12

## 2017-06-29 RX ADMIN — DIVALPROEX SODIUM SCH MG: 500 TABLET, DELAYED RELEASE ORAL at 08:04

## 2017-06-29 NOTE — CP.PCM.PN
Subjective





- Date & Time of Evaluation


Date of Evaluation: 06/29/17


Time of Evaluation: 08:00





- Subjective


Subjective: 





S/O no complaints/distress, no f/c n/v/d. pending placement


A/P cont current plan





Objective





- Vital Signs/Intake and Output


Vital Signs (last 24 hours): 


 











Temp Pulse Resp BP Pulse Ox


 


 98.6 F   73   20   163/77 H  93 L


 


 06/29/17 07:43  06/29/17 07:43  06/29/17 07:43  06/29/17 07:43  06/29/17 07:43











- Medications


Medications: 


 Current Medications





Atorvastatin Calcium (Lipitor)  20 mg PO [facility]


   Last Admin: 06/28/17 21:12 Dose:  20 mg


Divalproex Sodium (Depakote Dr(*Bid*))  500 mg PO BID [facility]


   Last Admin: 06/28/17 16:29 Dose:  500 mg


Enalapril Maleate (Vasotec)  10 mg PO DAILY [facility]


   Last Admin: 06/28/17 08:44 Dose:  10 mg


Fenofibrate (Tricor)  145 mg PO DAILY [facility]


   Last Admin: 06/28/17 08:45 Dose:  145 mg


Metoprolol Tartrate (Lopressor)  50 mg PO Q12 [facility]


   Last Admin: 06/28/17 21:13 Dose:  50 mg


Quetiapine Fumarate (Seroquel)  25 mg PO [facility]


   Last Admin: 06/28/17 21:12 Dose:  25 mg











- Labs


Labs: 


 





 03/23/17 05:45 





 03/23/17 05:45 





 











PT  11.2 SECONDS (9.4-12.0)   12/14/15  05:20    


 


INR  1.05  (0.89-1.20)   12/14/15  05:20    


 


APTT  36.2 SECONDS (23.1-32.0)  H  12/14/15  05:20    














- Constitutional


Appears: Well, Non-toxic, No Acute Distress





- Head Exam


Head Exam: ATRAUMATIC, NORMAL INSPECTION, NORMOCEPHALIC





- Eye Exam


Eye Exam: EOMI, Normal appearance, PERRL


Pupil Exam: NORMAL ACCOMODATION, PERRL





- ENT Exam


ENT Exam: Mucous Membranes Moist, Normal Exam





- Neck Exam


Neck Exam: Full ROM, Normal Inspection.  absent: Lymphadenopathy





- Respiratory Exam


Respiratory Exam: Clear to Ausculation Bilateral, NORMAL BREATHING PATTERN





- Cardiovascular Exam


Cardiovascular Exam: REGULAR RHYTHM, RRR, +S1, +S2.  absent: Murmur





- GI/Abdominal Exam


GI & Abdominal Exam: Soft, Normal Bowel Sounds.  absent: Tenderness





- Extremities Exam


Extremities Exam: Full ROM, Normal Capillary Refill, Normal Inspection.  absent

: Joint Swelling, Pedal Edema





- Back Exam


Back Exam: NORMAL INSPECTION





- Neurological Exam


Neurological Exam: Alert, Awake, CN II-XII Intact, Normal Gait, Oriented x3





- Psychiatric Exam


Psychiatric exam: Normal Affect, Normal Mood





- Skin


Skin Exam: Dry, Intact, Normal Color, Warm





Assessment and Plan


(1) DVT prophylaxis


Status: Chronic   





(2) Scrotal edema


Status: Chronic   





(3) CAD (coronary artery disease)


Status: Chronic   





(4) Smoking


Status: Chronic   





(5) Low back pain


Status: Chronic   





(6) Prediabetes


Status: Chronic   





(7) Neurocognitive disorder


Status: Chronic

## 2017-06-30 RX ADMIN — DIVALPROEX SODIUM SCH MG: 500 TABLET, DELAYED RELEASE ORAL at 16:41

## 2017-06-30 RX ADMIN — DIVALPROEX SODIUM SCH MG: 500 TABLET, DELAYED RELEASE ORAL at 09:44

## 2017-06-30 NOTE — CP.PCM.PN
Subjective





- Date & Time of Evaluation


Date of Evaluation: 06/30/17


Time of Evaluation: 07:19





- Subjective


Subjective: 





S/O no complaints/distress, no f/c n/v/d. pending placement


A/P cont current plan





Objective





- Vital Signs/Intake and Output


Vital Signs (last 24 hours): 


 











Temp Pulse Resp BP Pulse Ox


 


 97.5 F L  66   18   99/64 L  98 


 


 06/30/17 00:38  06/30/17 00:38  06/30/17 00:38  06/30/17 00:38  06/30/17 00:38











- Medications


Medications: 


 Current Medications





Atorvastatin Calcium (Lipitor)  20 mg PO [facility]


   Last Admin: 06/29/17 21:12 Dose:  20 mg


Divalproex Sodium (Depakote Dr(*Bid*))  500 mg PO BID [facility]


   Last Admin: 06/29/17 16:12 Dose:  500 mg


Enalapril Maleate (Vasotec)  10 mg PO DAILY [facility]


   Last Admin: 06/29/17 08:04 Dose:  10 mg


Fenofibrate (Tricor)  145 mg PO DAILY [facility]


   Last Admin: 06/29/17 08:04 Dose:  145 mg


Metoprolol Tartrate (Lopressor)  50 mg PO Q12 [facility]


   Last Admin: 06/29/17 21:12 Dose:  50 mg


Quetiapine Fumarate (Seroquel)  25 mg PO [facility]


   Last Admin: 06/29/17 21:12 Dose:  25 mg











- Labs


Labs: 


 





 03/23/17 05:45 





 03/23/17 05:45 





 











PT  11.2 SECONDS (9.4-12.0)   12/14/15  05:20    


 


INR  1.05  (0.89-1.20)   12/14/15  05:20    


 


APTT  36.2 SECONDS (23.1-32.0)  H  12/14/15  05:20    














- Constitutional


Appears: Well, Non-toxic, No Acute Distress





- Head Exam


Head Exam: ATRAUMATIC, NORMAL INSPECTION, NORMOCEPHALIC





- Eye Exam


Eye Exam: EOMI, Normal appearance, PERRL


Pupil Exam: NORMAL ACCOMODATION, PERRL





- ENT Exam


ENT Exam: Mucous Membranes Moist, Normal Exam





- Neck Exam


Neck Exam: Full ROM, Normal Inspection.  absent: Lymphadenopathy





- Respiratory Exam


Respiratory Exam: Clear to Ausculation Bilateral, NORMAL BREATHING PATTERN





- Cardiovascular Exam


Cardiovascular Exam: REGULAR RHYTHM, RRR, +S1, +S2.  absent: Murmur





- GI/Abdominal Exam


GI & Abdominal Exam: Soft, Normal Bowel Sounds.  absent: Tenderness





- Extremities Exam


Extremities Exam: Full ROM, Normal Capillary Refill, Normal Inspection.  absent

: Joint Swelling, Pedal Edema





- Back Exam


Back Exam: NORMAL INSPECTION





- Neurological Exam


Neurological Exam: Alert, Awake, CN II-XII Intact, Normal Gait, Oriented x3





- Psychiatric Exam


Psychiatric exam: Normal Affect, Normal Mood





- Skin


Skin Exam: Dry, Intact, Normal Color, Warm





Assessment and Plan


(1) DVT prophylaxis


Status: Chronic   





(2) Scrotal edema


Status: Chronic   





(3) CAD (coronary artery disease)


Status: Chronic   





(4) Smoking


Status: Chronic   





(5) Low back pain


Status: Chronic   





(6) Prediabetes


Status: Chronic   





(7) Neurocognitive disorder


Status: Chronic

## 2017-07-01 RX ADMIN — DIVALPROEX SODIUM SCH MG: 500 TABLET, DELAYED RELEASE ORAL at 09:03

## 2017-07-01 NOTE — CP.PCM.PN
Subjective





- Date & Time of Evaluation


Date of Evaluation: 07/01/17


Time of Evaluation: 10:19





- Subjective


Subjective: 





S/O no complaints/distress, no f/c n/v/d. pending placement


A/P cont current plan





Objective





- Vital Signs/Intake and Output


Vital Signs (last 24 hours): 


 











Temp Pulse Resp BP Pulse Ox


 


 97.7 F   73   20   92/64 L  98 


 


 07/01/17 08:54  07/01/17 08:54  07/01/17 08:54  07/01/17 08:54  07/01/17 08:54











- Medications


Medications: 


 Current Medications





Atorvastatin Calcium (Lipitor)  20 mg PO [facility]


   Last Admin: 06/30/17 21:39 Dose:  20 mg


Divalproex Sodium (Depakote Dr(*Bid*))  500 mg PO BID [facility]


   Last Admin: 07/01/17 09:03 Dose:  500 mg


Enalapril Maleate (Vasotec)  10 mg PO DAILY [facility]


   Last Admin: 07/01/17 09:05 Dose:  10 mg


Fenofibrate (Tricor)  145 mg PO DAILY [facility]


   Last Admin: 07/01/17 09:04 Dose:  145 mg


Metoprolol Tartrate (Lopressor)  50 mg PO Q12 [facility]


   Last Admin: 07/01/17 09:05 Dose:  Not Given


Quetiapine Fumarate (Seroquel)  25 mg PO HS [facility]


   Last Admin: 06/30/17 21:38 Dose:  25 mg











- Labs


Labs: 


 





 03/23/17 05:45 





 03/23/17 05:45 





 











PT  11.2 SECONDS (9.4-12.0)   12/14/15  05:20    


 


INR  1.05  (0.89-1.20)   12/14/15  05:20    


 


APTT  36.2 SECONDS (23.1-32.0)  H  12/14/15  05:20    














- Constitutional


Appears: Well, Non-toxic, No Acute Distress





- Head Exam


Head Exam: ATRAUMATIC, NORMAL INSPECTION, NORMOCEPHALIC





- Eye Exam


Eye Exam: EOMI, Normal appearance, PERRL


Pupil Exam: NORMAL ACCOMODATION, PERRL





- ENT Exam


ENT Exam: Mucous Membranes Moist, Normal Exam





- Neck Exam


Neck Exam: Full ROM, Normal Inspection.  absent: Lymphadenopathy





- Respiratory Exam


Respiratory Exam: Clear to Ausculation Bilateral, NORMAL BREATHING PATTERN





- Cardiovascular Exam


Cardiovascular Exam: REGULAR RHYTHM, RRR, +S1, +S2.  absent: Murmur





- GI/Abdominal Exam


GI & Abdominal Exam: Soft, Normal Bowel Sounds.  absent: Tenderness





- Extremities Exam


Extremities Exam: Full ROM, Normal Capillary Refill, Normal Inspection.  absent

: Joint Swelling, Pedal Edema





- Back Exam


Back Exam: NORMAL INSPECTION





- Neurological Exam


Neurological Exam: Alert, Awake, CN II-XII Intact, Normal Gait, Oriented x3





- Psychiatric Exam


Psychiatric exam: Normal Affect, Normal Mood





- Skin


Skin Exam: Dry, Intact, Normal Color, Warm





Assessment and Plan


(1) DVT prophylaxis


Status: Chronic   





(2) Scrotal edema


Status: Chronic   





(3) CAD (coronary artery disease)


Status: Chronic   





(4) Smoking


Status: Chronic   





(5) Low back pain


Status: Chronic   





(6) Prediabetes


Status: Chronic   





(7) Neurocognitive disorder


Status: Chronic

## 2017-07-02 RX ADMIN — DIVALPROEX SODIUM SCH MG: 500 TABLET, DELAYED RELEASE ORAL at 16:56

## 2017-07-02 RX ADMIN — DIVALPROEX SODIUM SCH MG: 500 TABLET, DELAYED RELEASE ORAL at 08:55

## 2017-07-02 NOTE — CP.PCM.PN
Subjective





- Date & Time of Evaluation


Date of Evaluation: 07/02/17


Time of Evaluation: 09:33





- Subjective


Subjective: 





S/O no complaints/distress, no f/c n/v/d. pending placement


A/P cont current plan





Objective





- Vital Signs/Intake and Output


Vital Signs (last 24 hours): 


 











Temp Pulse Resp BP Pulse Ox


 


 98.4 F   68   20   104/69   95 


 


 07/02/17 08:18  07/02/17 08:18  07/02/17 08:18  07/02/17 08:55  07/02/17 08:18











- Medications


Medications: 


 Current Medications





Atorvastatin Calcium (Lipitor)  20 mg PO [facility]


   Last Admin: 07/01/17 21:48 Dose:  20 mg


Divalproex Sodium (Depakote Dr(*Bid*))  500 mg PO BID [facility]


   Last Admin: 07/02/17 08:55 Dose:  500 mg


Enalapril Maleate (Vasotec)  10 mg PO DAILY [facility]


   Last Admin: 07/01/17 09:05 Dose:  10 mg


Fenofibrate (Tricor)  145 mg PO DAILY [facility]


   Last Admin: 07/02/17 08:56 Dose:  145 mg


Metoprolol Tartrate (Lopressor)  50 mg PO Q12 [facility]


   Last Admin: 07/02/17 08:55 Dose:  Not Given


Quetiapine Fumarate (Seroquel)  25 mg PO HS [facility]


   Last Admin: 07/01/17 21:48 Dose:  25 mg











- Labs


Labs: 


 





 03/23/17 05:45 





 03/23/17 05:45 





 











PT  11.2 SECONDS (9.4-12.0)   12/14/15  05:20    


 


INR  1.05  (0.89-1.20)   12/14/15  05:20    


 


APTT  36.2 SECONDS (23.1-32.0)  H  12/14/15  05:20    














- Constitutional


Appears: Well, Non-toxic, No Acute Distress





- Head Exam


Head Exam: ATRAUMATIC, NORMAL INSPECTION, NORMOCEPHALIC





- Eye Exam


Eye Exam: EOMI, Normal appearance, PERRL


Pupil Exam: NORMAL ACCOMODATION, PERRL





- ENT Exam


ENT Exam: Mucous Membranes Moist, Normal Exam





- Neck Exam


Neck Exam: Full ROM, Normal Inspection.  absent: Lymphadenopathy





- Respiratory Exam


Respiratory Exam: Clear to Ausculation Bilateral, NORMAL BREATHING PATTERN





- Cardiovascular Exam


Cardiovascular Exam: REGULAR RHYTHM, RRR, +S1, +S2.  absent: Murmur





- GI/Abdominal Exam


GI & Abdominal Exam: Soft, Normal Bowel Sounds.  absent: Tenderness





- Extremities Exam


Extremities Exam: Full ROM, Normal Capillary Refill, Normal Inspection.  absent

: Joint Swelling, Pedal Edema





- Back Exam


Back Exam: NORMAL INSPECTION





- Neurological Exam


Neurological Exam: Alert, Awake, CN II-XII Intact, Normal Gait, Oriented x3





- Psychiatric Exam


Psychiatric exam: Normal Affect, Normal Mood





- Skin


Skin Exam: Dry, Intact, Normal Color, Warm





Assessment and Plan


(1) DVT prophylaxis


Status: Chronic   





(2) Scrotal edema


Status: Chronic   





(3) CAD (coronary artery disease)


Status: Chronic   





(4) Smoking


Status: Chronic   





(5) Low back pain


Status: Chronic   





(6) Prediabetes


Status: Chronic   





(7) Neurocognitive disorder


Status: Chronic

## 2017-07-03 RX ADMIN — DIVALPROEX SODIUM SCH MG: 500 TABLET, DELAYED RELEASE ORAL at 16:43

## 2017-07-03 RX ADMIN — DIVALPROEX SODIUM SCH MG: 500 TABLET, DELAYED RELEASE ORAL at 10:00

## 2017-07-03 NOTE — CP.PCM.PN
Subjective





- Date & Time of Evaluation


Date of Evaluation: 07/03/17


Time of Evaluation: 07:33





- Subjective


Subjective: 





S/O no complaints/distress, no f/c n/v/d. pending placement


A/P cont current plan





Objective





- Vital Signs/Intake and Output


Vital Signs (last 24 hours): 


 











Temp Pulse Resp BP Pulse Ox


 


 98.7 F   73   20   108/74   97 


 


 07/03/17 00:44  07/03/17 00:44  07/03/17 00:44  07/03/17 00:44  07/03/17 00:44











- Medications


Medications: 


 Current Medications





Atorvastatin Calcium (Lipitor)  20 mg PO HS [facility]


   Last Admin: 07/02/17 21:33 Dose:  20 mg


Divalproex Sodium (Depakote Dr(*Bid*))  500 mg PO BID [facility]


   Last Admin: 07/02/17 16:56 Dose:  500 mg


Enalapril Maleate (Vasotec)  10 mg PO DAILY [facility]


   Last Admin: 07/02/17 16:56 Dose:  10 mg


Fenofibrate (Tricor)  145 mg PO DAILY [facility]


   Last Admin: 07/02/17 08:56 Dose:  145 mg


Metoprolol Tartrate (Lopressor)  50 mg PO Q12 [facility]


   Last Admin: 07/02/17 21:33 Dose:  50 mg


Quetiapine Fumarate (Seroquel)  25 mg PO HS [facility]


   Last Admin: 07/02/17 21:33 Dose:  25 mg











- Labs


Labs: 


 





 03/23/17 05:45 





 03/23/17 05:45 





 











PT  11.2 SECONDS (9.4-12.0)   12/14/15  05:20    


 


INR  1.05  (0.89-1.20)   12/14/15  05:20    


 


APTT  36.2 SECONDS (23.1-32.0)  H  12/14/15  05:20    














- Constitutional


Appears: Well, Non-toxic, No Acute Distress





- Head Exam


Head Exam: ATRAUMATIC, NORMAL INSPECTION, NORMOCEPHALIC





- Eye Exam


Eye Exam: EOMI, Normal appearance, PERRL


Pupil Exam: NORMAL ACCOMODATION, PERRL





- ENT Exam


ENT Exam: Mucous Membranes Moist, Normal Exam





- Neck Exam


Neck Exam: Full ROM, Normal Inspection.  absent: Lymphadenopathy





- Respiratory Exam


Respiratory Exam: Clear to Ausculation Bilateral, NORMAL BREATHING PATTERN





- Cardiovascular Exam


Cardiovascular Exam: REGULAR RHYTHM, RRR, +S1, +S2.  absent: Murmur





- GI/Abdominal Exam


GI & Abdominal Exam: Soft, Normal Bowel Sounds.  absent: Tenderness





- Extremities Exam


Extremities Exam: Full ROM, Normal Capillary Refill, Normal Inspection.  absent

: Joint Swelling, Pedal Edema





- Back Exam


Back Exam: NORMAL INSPECTION





- Neurological Exam


Neurological Exam: Alert, Awake, CN II-XII Intact, Normal Gait, Oriented x3





- Psychiatric Exam


Psychiatric exam: Normal Affect, Normal Mood





- Skin


Skin Exam: Dry, Intact, Normal Color, Warm





Assessment and Plan


(1) DVT prophylaxis


Status: Chronic   





(2) Scrotal edema


Status: Chronic   





(3) CAD (coronary artery disease)


Status: Chronic   





(4) Smoking


Status: Chronic   





(5) Low back pain


Status: Chronic   





(6) Prediabetes


Status: Chronic   





(7) Neurocognitive disorder


Status: Chronic

## 2017-07-04 RX ADMIN — DIVALPROEX SODIUM SCH MG: 500 TABLET, DELAYED RELEASE ORAL at 17:06

## 2017-07-04 RX ADMIN — DIVALPROEX SODIUM SCH MG: 500 TABLET, DELAYED RELEASE ORAL at 08:44

## 2017-07-04 NOTE — CP.PCM.PN
Subjective





- Date & Time of Evaluation


Date of Evaluation: 07/04/17


Time of Evaluation: 09:48





- Subjective


Subjective: 





S/O no complaints/distress, no f/c n/v/d. pending placement


A/P cont current plan





Objective





- Vital Signs/Intake and Output


Vital Signs (last 24 hours): 


 











Temp Pulse Resp BP Pulse Ox


 


 98 F   70   18   101/70   97 


 


 07/04/17 08:07  07/04/17 08:07  07/04/17 08:07  07/04/17 08:07  07/04/17 08:07











- Medications


Medications: 


 Current Medications





Atorvastatin Calcium (Lipitor)  20 mg PO [facility]


   Last Admin: 07/03/17 21:41 Dose:  20 mg


Divalproex Sodium (Depakote Dr(*Bid*))  500 mg PO BID [facility]


   Last Admin: 07/04/17 08:44 Dose:  500 mg


Enalapril Maleate (Vasotec)  10 mg PO DAILY [facility]


   Last Admin: 07/04/17 08:44 Dose:  10 mg


Fenofibrate (Tricor)  145 mg PO DAILY [facility]


   Last Admin: 07/04/17 08:44 Dose:  145 mg


Metoprolol Tartrate (Lopressor)  50 mg PO Q12 [facility]


   Last Admin: 07/04/17 08:44 Dose:  50 mg


Quetiapine Fumarate (Seroquel)  25 mg PO HS [facility]


   Last Admin: 07/03/17 21:42 Dose:  25 mg











- Labs


Labs: 


 





 03/23/17 05:45 





 03/23/17 05:45 





 











PT  11.2 SECONDS (9.4-12.0)   12/14/15  05:20    


 


INR  1.05  (0.89-1.20)   12/14/15  05:20    


 


APTT  36.2 SECONDS (23.1-32.0)  H  12/14/15  05:20    














- Constitutional


Appears: Well, Non-toxic, No Acute Distress





- Head Exam


Head Exam: ATRAUMATIC, NORMAL INSPECTION, NORMOCEPHALIC





- Eye Exam


Eye Exam: EOMI, Normal appearance, PERRL


Pupil Exam: NORMAL ACCOMODATION, PERRL





- ENT Exam


ENT Exam: Mucous Membranes Moist, Normal Exam





- Neck Exam


Neck Exam: Full ROM, Normal Inspection.  absent: Lymphadenopathy





- Respiratory Exam


Respiratory Exam: Clear to Ausculation Bilateral, NORMAL BREATHING PATTERN





- Cardiovascular Exam


Cardiovascular Exam: REGULAR RHYTHM, RRR, +S1, +S2.  absent: Murmur





- GI/Abdominal Exam


GI & Abdominal Exam: Soft, Normal Bowel Sounds.  absent: Tenderness





- Extremities Exam


Extremities Exam: Full ROM, Normal Capillary Refill, Normal Inspection.  absent

: Joint Swelling, Pedal Edema





- Back Exam


Back Exam: NORMAL INSPECTION





- Neurological Exam


Neurological Exam: Alert, Awake, CN II-XII Intact, Normal Gait, Oriented x3





- Psychiatric Exam


Psychiatric exam: Normal Affect, Normal Mood





- Skin


Skin Exam: Dry, Intact, Normal Color, Warm





Assessment and Plan


(1) DVT prophylaxis


Status: Chronic   





(2) Scrotal edema


Status: Chronic   





(3) CAD (coronary artery disease)


Status: Chronic   





(4) Smoking


Status: Chronic   





(5) Low back pain


Status: Chronic   





(6) Prediabetes


Status: Chronic   





(7) Neurocognitive disorder


Status: Chronic

## 2017-07-05 RX ADMIN — DIVALPROEX SODIUM SCH MG: 500 TABLET, DELAYED RELEASE ORAL at 16:54

## 2017-07-05 RX ADMIN — DIVALPROEX SODIUM SCH MG: 500 TABLET, DELAYED RELEASE ORAL at 09:35

## 2017-07-05 NOTE — CP.PCM.PN
Subjective





- Date & Time of Evaluation


Date of Evaluation: 07/05/17


Time of Evaluation: 08:09





- Subjective


Subjective: 





S/O no complaints/distress, no f/c n/v/d. pending placement


A/P cont current plan





Objective





- Vital Signs/Intake and Output


Vital Signs (last 24 hours): 


 











Temp Pulse Resp BP Pulse Ox


 


 97.8 F   66   20   106/71   95 


 


 07/05/17 07:20  07/05/17 07:20  07/05/17 07:20  07/05/17 07:20  07/05/17 07:20











- Medications


Medications: 


 Current Medications





Atorvastatin Calcium (Lipitor)  20 mg PO [facility]


   Last Admin: 07/04/17 21:00 Dose:  20 mg


Divalproex Sodium (Depakote Dr(*Bid*))  500 mg PO BID [facility]


   Last Admin: 07/04/17 17:06 Dose:  500 mg


Enalapril Maleate (Vasotec)  10 mg PO DAILY [facility]


   Last Admin: 07/04/17 08:44 Dose:  10 mg


Fenofibrate (Tricor)  145 mg PO DAILY [facility]


   Last Admin: 07/04/17 08:44 Dose:  145 mg


Metoprolol Tartrate (Lopressor)  50 mg PO Q12 [facility]


   Last Admin: 07/04/17 20:56 Dose:  50 mg


Quetiapine Fumarate (Seroquel)  25 mg PO HS [facility]


   Last Admin: 07/04/17 21:00 Dose:  25 mg











- Labs


Labs: 


 





 03/23/17 05:45 





 03/23/17 05:45 





 











PT  11.2 SECONDS (9.4-12.0)   12/14/15  05:20    


 


INR  1.05  (0.89-1.20)   12/14/15  05:20    


 


APTT  36.2 SECONDS (23.1-32.0)  H  12/14/15  05:20    














- Constitutional


Appears: Well, Non-toxic, No Acute Distress





- Head Exam


Head Exam: ATRAUMATIC, NORMAL INSPECTION, NORMOCEPHALIC





- Eye Exam


Eye Exam: EOMI, Normal appearance, PERRL


Pupil Exam: NORMAL ACCOMODATION, PERRL





- ENT Exam


ENT Exam: Mucous Membranes Moist, Normal Exam





- Neck Exam


Neck Exam: Full ROM, Normal Inspection.  absent: Lymphadenopathy





- Respiratory Exam


Respiratory Exam: Clear to Ausculation Bilateral, NORMAL BREATHING PATTERN





- Cardiovascular Exam


Cardiovascular Exam: REGULAR RHYTHM, RRR, +S1, +S2.  absent: Murmur





- GI/Abdominal Exam


GI & Abdominal Exam: Soft, Normal Bowel Sounds.  absent: Tenderness





- Extremities Exam


Extremities Exam: Full ROM, Normal Capillary Refill, Normal Inspection.  absent

: Joint Swelling, Pedal Edema





- Back Exam


Back Exam: NORMAL INSPECTION





- Neurological Exam


Neurological Exam: Alert, Awake, CN II-XII Intact, Normal Gait, Oriented x3





- Psychiatric Exam


Psychiatric exam: Normal Affect, Normal Mood





- Skin


Skin Exam: Dry, Intact, Normal Color, Warm





Assessment and Plan


(1) DVT prophylaxis


Status: Chronic   





(2) Scrotal edema


Status: Chronic   





(3) CAD (coronary artery disease)


Status: Chronic   





(4) Smoking


Status: Chronic   





(5) Low back pain


Status: Chronic   





(6) Prediabetes


Status: Chronic   





(7) Neurocognitive disorder


Status: Chronic

## 2017-07-06 RX ADMIN — DIVALPROEX SODIUM SCH MG: 500 TABLET, DELAYED RELEASE ORAL at 09:52

## 2017-07-06 RX ADMIN — DIVALPROEX SODIUM SCH MG: 500 TABLET, DELAYED RELEASE ORAL at 17:45

## 2017-07-06 NOTE — CP.PCM.PN
Subjective





- Date & Time of Evaluation


Date of Evaluation: 07/06/17


Time of Evaluation: 07:51





- Subjective


Subjective: 





S/O no complaints/distress, no f/c n/v/d. pending placement


A/P cont current plan





Objective





- Vital Signs/Intake and Output


Vital Signs (last 24 hours): 


 











Temp Pulse Resp BP Pulse Ox


 


 98 F   71   19   99/62 L  95 


 


 07/06/17 00:33  07/06/17 00:33  07/06/17 00:33  07/06/17 00:33  07/06/17 00:33











- Medications


Medications: 


 Current Medications





Atorvastatin Calcium (Lipitor)  20 mg PO [facility]


   Last Admin: 07/05/17 22:09 Dose:  20 mg


Divalproex Sodium (Depakote Dr(*Bid*))  500 mg PO BID [facility]


   Last Admin: 07/05/17 16:54 Dose:  500 mg


Enalapril Maleate (Vasotec)  10 mg PO DAILY [facility]


   Last Admin: 07/05/17 09:36 Dose:  10 mg


Fenofibrate (Tricor)  145 mg PO DAILY [facility]


   Last Admin: 07/05/17 09:36 Dose:  145 mg


Metoprolol Tartrate (Lopressor)  50 mg PO Q12 [facility]


   Last Admin: 07/05/17 21:09 Dose:  50 mg


Quetiapine Fumarate (Seroquel)  25 mg PO HS [facility]


   Last Admin: 07/05/17 22:08 Dose:  25 mg











- Labs


Labs: 


 





 03/23/17 05:45 





 03/23/17 05:45 





 











PT  11.2 SECONDS (9.4-12.0)   12/14/15  05:20    


 


INR  1.05  (0.89-1.20)   12/14/15  05:20    


 


APTT  36.2 SECONDS (23.1-32.0)  H  12/14/15  05:20    














- Constitutional


Appears: Well, Non-toxic, No Acute Distress





- Head Exam


Head Exam: ATRAUMATIC, NORMAL INSPECTION, NORMOCEPHALIC





- Eye Exam


Eye Exam: EOMI, Normal appearance, PERRL


Pupil Exam: NORMAL ACCOMODATION, PERRL





- ENT Exam


ENT Exam: Mucous Membranes Moist, Normal Exam





- Neck Exam


Neck Exam: Full ROM, Normal Inspection.  absent: Lymphadenopathy





- Respiratory Exam


Respiratory Exam: Clear to Ausculation Bilateral, NORMAL BREATHING PATTERN





- Cardiovascular Exam


Cardiovascular Exam: REGULAR RHYTHM, +S1, +S2.  absent: Murmur





- GI/Abdominal Exam


GI & Abdominal Exam: Soft, Normal Bowel Sounds.  absent: Tenderness





- Extremities Exam


Extremities Exam: Full ROM, Normal Capillary Refill, Normal Inspection.  absent

: Joint Swelling, Pedal Edema





- Back Exam


Back Exam: NORMAL INSPECTION





- Neurological Exam


Neurological Exam: Alert, Awake, CN II-XII Intact, Normal Gait, Oriented x3





- Psychiatric Exam


Psychiatric exam: Normal Affect, Normal Mood





- Skin


Skin Exam: Dry, Intact, Normal Color, Warm





Assessment and Plan


(1) DVT prophylaxis


Status: Chronic   





(2) Scrotal edema


Status: Chronic   





(3) CAD (coronary artery disease)


Status: Chronic   





(4) Smoking


Status: Chronic   





(5) Low back pain


Status: Chronic   





(6) Prediabetes


Status: Chronic   





(7) Neurocognitive disorder


Status: Chronic

## 2017-07-07 RX ADMIN — DIVALPROEX SODIUM SCH MG: 500 TABLET, DELAYED RELEASE ORAL at 09:16

## 2017-07-07 RX ADMIN — DIVALPROEX SODIUM SCH MG: 500 TABLET, DELAYED RELEASE ORAL at 17:29

## 2017-07-07 NOTE — CP.PCM.PN
Subjective





- Date & Time of Evaluation


Date of Evaluation: 07/07/17


Time of Evaluation: 09:57





- Subjective


Subjective: 





S/O no complaints/distress, no f/c n/v/d. pending placement


A/P cont current plan





Objective





- Vital Signs/Intake and Output


Vital Signs (last 24 hours): 


 











Temp Pulse Resp BP Pulse Ox


 


 98 F   75   18   111/75   97 


 


 07/07/17 08:39  07/07/17 09:17  07/07/17 08:39  07/07/17 09:17  07/07/17 08:39











- Medications


Medications: 


 Current Medications





Atorvastatin Calcium (Lipitor)  20 mg PO [facility]


   Last Admin: 07/06/17 22:35 Dose:  20 mg


Divalproex Sodium (Depakote Dr(*Bid*))  500 mg PO BID [facility]


   Last Admin: 07/07/17 09:16 Dose:  500 mg


Enalapril Maleate (Vasotec)  10 mg PO DAILY [facility]


   Last Admin: 07/07/17 09:17 Dose:  10 mg


Fenofibrate (Tricor)  145 mg PO DAILY [facility]


   Last Admin: 07/07/17 09:18 Dose:  145 mg


Metoprolol Tartrate (Lopressor)  50 mg PO Q12 [facility]


   Last Admin: 07/07/17 09:17 Dose:  50 mg


Quetiapine Fumarate (Seroquel)  25 mg PO HS [facility]


   Last Admin: 07/06/17 22:34 Dose:  25 mg











- Labs


Labs: 


 





 03/23/17 05:45 





 03/23/17 05:45 





 











PT  11.2 SECONDS (9.4-12.0)   12/14/15  05:20    


 


INR  1.05  (0.89-1.20)   12/14/15  05:20    


 


APTT  36.2 SECONDS (23.1-32.0)  H  12/14/15  05:20    














- Constitutional


Appears: Well, Non-toxic, No Acute Distress





- Head Exam


Head Exam: ATRAUMATIC, NORMAL INSPECTION, NORMOCEPHALIC





- Eye Exam


Eye Exam: EOMI, Normal appearance, PERRL


Pupil Exam: NORMAL ACCOMODATION, PERRL





- ENT Exam


ENT Exam: Mucous Membranes Moist, Normal Exam





- Neck Exam


Neck Exam: Full ROM, Normal Inspection.  absent: Lymphadenopathy





- Respiratory Exam


Respiratory Exam: Clear to Ausculation Bilateral, NORMAL BREATHING PATTERN





- Cardiovascular Exam


Cardiovascular Exam: REGULAR RHYTHM, RRR, +S1, +S2.  absent: Murmur





- GI/Abdominal Exam


GI & Abdominal Exam: Soft, Normal Bowel Sounds.  absent: Tenderness





- Extremities Exam


Extremities Exam: Full ROM, Normal Capillary Refill, Normal Inspection.  absent

: Joint Swelling, Pedal Edema





- Back Exam


Back Exam: NORMAL INSPECTION





- Neurological Exam


Neurological Exam: Alert, Awake, CN II-XII Intact, Normal Gait, Oriented x3





- Psychiatric Exam


Psychiatric exam: Normal Affect, Normal Mood





- Skin


Skin Exam: Dry, Intact, Normal Color, Warm





Assessment and Plan


(1) DVT prophylaxis


Status: Chronic   





(2) Scrotal edema


Status: Chronic   





(3) CAD (coronary artery disease)


Status: Chronic   





(4) Smoking


Status: Chronic   





(5) Low back pain


Status: Chronic   





(6) Prediabetes


Status: Chronic   





(7) Neurocognitive disorder


Status: Chronic

## 2017-07-08 RX ADMIN — DIVALPROEX SODIUM SCH MG: 500 TABLET, DELAYED RELEASE ORAL at 09:29

## 2017-07-08 RX ADMIN — DIVALPROEX SODIUM SCH MG: 500 TABLET, DELAYED RELEASE ORAL at 16:54

## 2017-07-08 NOTE — CP.PCM.PN
Subjective





- Date & Time of Evaluation


Date of Evaluation: 07/08/17


Time of Evaluation: 07:47





- Subjective


Subjective: 





S/O no complaints/distress, no f/c n/v/d. pending placement


A/P cont current plan





Objective





- Vital Signs/Intake and Output


Vital Signs (last 24 hours): 


 











Temp Pulse Resp BP Pulse Ox


 


 97.5 F L  70   20   102/67   97 


 


 07/08/17 07:22  07/08/17 07:22  07/08/17 07:22  07/08/17 07:22  07/08/17 07:22











- Medications


Medications: 


 Current Medications





Atorvastatin Calcium (Lipitor)  20 mg PO [facility]


   Last Admin: 07/07/17 22:26 Dose:  20 mg


Divalproex Sodium (Depakote Dr(*Bid*))  500 mg PO BID [facility]


   Last Admin: 07/07/17 17:29 Dose:  500 mg


Enalapril Maleate (Vasotec)  10 mg PO DAILY [facility]


   Last Admin: 07/07/17 09:17 Dose:  10 mg


Fenofibrate (Tricor)  145 mg PO DAILY [facility]


   Last Admin: 07/07/17 09:18 Dose:  145 mg


Metoprolol Tartrate (Lopressor)  50 mg PO Q12 [facility]


   Last Admin: 07/07/17 22:26 Dose:  50 mg


Quetiapine Fumarate (Seroquel)  25 mg PO HS [facility]


   Last Admin: 07/07/17 22:27 Dose:  25 mg











- Labs


Labs: 


 





 03/23/17 05:45 





 03/23/17 05:45 





 











PT  11.2 SECONDS (9.4-12.0)   12/14/15  05:20    


 


INR  1.05  (0.89-1.20)   12/14/15  05:20    


 


APTT  36.2 SECONDS (23.1-32.0)  H  12/14/15  05:20    














- Constitutional


Appears: Well, Non-toxic, No Acute Distress





- Head Exam


Head Exam: ATRAUMATIC, NORMAL INSPECTION, NORMOCEPHALIC





- Eye Exam


Eye Exam: EOMI, Normal appearance, PERRL


Pupil Exam: NORMAL ACCOMODATION, PERRL





- ENT Exam


ENT Exam: Mucous Membranes Moist, Normal Exam





- Neck Exam


Neck Exam: Full ROM, Normal Inspection.  absent: Lymphadenopathy





- Respiratory Exam


Respiratory Exam: Clear to Ausculation Bilateral, NORMAL BREATHING PATTERN





- Cardiovascular Exam


Cardiovascular Exam: REGULAR RHYTHM, RRR, +S1, +S2.  absent: Murmur





- GI/Abdominal Exam


GI & Abdominal Exam: Soft, Normal Bowel Sounds.  absent: Tenderness





- Extremities Exam


Extremities Exam: Full ROM, Normal Capillary Refill, Normal Inspection.  absent

: Joint Swelling, Pedal Edema





- Back Exam


Back Exam: NORMAL INSPECTION





- Neurological Exam


Neurological Exam: Alert, Awake, CN II-XII Intact, Normal Gait, Oriented x3





- Psychiatric Exam


Psychiatric exam: Normal Affect, Normal Mood





- Skin


Skin Exam: Dry, Intact, Normal Color, Warm





Assessment and Plan


(1) DVT prophylaxis


Status: Chronic   





(2) Scrotal edema


Status: Chronic   





(3) CAD (coronary artery disease)


Status: Chronic   





(4) Smoking


Status: Chronic   





(5) Low back pain


Status: Chronic   





(6) Prediabetes


Status: Chronic   





(7) Neurocognitive disorder


Status: Chronic

## 2017-07-09 RX ADMIN — DIVALPROEX SODIUM SCH MG: 500 TABLET, DELAYED RELEASE ORAL at 08:31

## 2017-07-09 RX ADMIN — DIVALPROEX SODIUM SCH MG: 500 TABLET, DELAYED RELEASE ORAL at 16:06

## 2017-07-09 NOTE — CP.PCM.PN
Subjective





- Date & Time of Evaluation


Date of Evaluation: 07/09/17


Time of Evaluation: 09:00





- Subjective


Subjective: 





S/O no complaints/distress, no f/c n/v/d. pending placement


A/P cont current plan





Objective





- Vital Signs/Intake and Output


Vital Signs (last 24 hours): 


 











Temp Pulse Resp BP Pulse Ox


 


 98.2 F   69   20   127/80   95 


 


 07/09/17 16:24  07/09/17 16:24  07/09/17 16:24  07/09/17 16:24  07/09/17 16:24











- Medications


Medications: 


 Current Medications





Atorvastatin Calcium (Lipitor)  20 mg PO [facility]


   Last Admin: 07/08/17 21:51 Dose:  20 mg


Divalproex Sodium (Depakote Dr(*Bid*))  500 mg PO BID [facility]


   Last Admin: 07/09/17 16:06 Dose:  500 mg


Enalapril Maleate (Vasotec)  10 mg PO DAILY [facility]


   Last Admin: 07/09/17 08:32 Dose:  10 mg


Fenofibrate (Tricor)  145 mg PO DAILY [facility]


   Last Admin: 07/09/17 08:32 Dose:  145 mg


Metoprolol Tartrate (Lopressor)  50 mg PO Q12 [facility]


   Last Admin: 07/09/17 08:31 Dose:  50 mg


Quetiapine Fumarate (Seroquel)  25 mg PO HS [facility]


   Last Admin: 07/08/17 21:51 Dose:  25 mg











- Labs


Labs: 


 





 03/23/17 05:45 





 03/23/17 05:45 





 











PT  11.2 SECONDS (9.4-12.0)   12/14/15  05:20    


 


INR  1.05  (0.89-1.20)   12/14/15  05:20    


 


APTT  36.2 SECONDS (23.1-32.0)  H  12/14/15  05:20    














- Constitutional


Appears: Well, Non-toxic, No Acute Distress





- Head Exam


Head Exam: ATRAUMATIC, NORMAL INSPECTION, NORMOCEPHALIC





- Eye Exam


Eye Exam: EOMI, Normal appearance, PERRL


Pupil Exam: NORMAL ACCOMODATION, PERRL





- ENT Exam


ENT Exam: Mucous Membranes Moist, Normal Exam





- Neck Exam


Neck Exam: Full ROM, Normal Inspection.  absent: Lymphadenopathy





- Respiratory Exam


Respiratory Exam: Clear to Ausculation Bilateral, NORMAL BREATHING PATTERN





- Cardiovascular Exam


Cardiovascular Exam: REGULAR RHYTHM, RRR, +S1, +S2.  absent: Murmur





- GI/Abdominal Exam


GI & Abdominal Exam: Soft, Normal Bowel Sounds.  absent: Tenderness





- Extremities Exam


Extremities Exam: Full ROM, Normal Capillary Refill, Normal Inspection.  absent

: Joint Swelling, Pedal Edema





- Back Exam


Back Exam: NORMAL INSPECTION





- Neurological Exam


Neurological Exam: Alert, Awake, CN II-XII Intact, Normal Gait, Oriented x3





- Psychiatric Exam


Psychiatric exam: Normal Affect, Normal Mood





- Skin


Skin Exam: Dry, Intact, Normal Color, Warm





Assessment and Plan


(1) DVT prophylaxis


Status: Chronic   





(2) Scrotal edema


Status: Chronic   





(3) CAD (coronary artery disease)


Status: Chronic   





(4) Smoking


Status: Chronic   





(5) Low back pain


Status: Chronic   





(6) Prediabetes


Status: Chronic   





(7) Neurocognitive disorder


Status: Chronic

## 2017-07-10 RX ADMIN — DIVALPROEX SODIUM SCH MG: 500 TABLET, DELAYED RELEASE ORAL at 09:34

## 2017-07-10 RX ADMIN — DIVALPROEX SODIUM SCH MG: 500 TABLET, DELAYED RELEASE ORAL at 16:59

## 2017-07-10 NOTE — CP.PCM.PN
Subjective





- Date & Time of Evaluation


Date of Evaluation: 07/10/17


Time of Evaluation: 07:17





- Subjective


Subjective: 





S/O no complaints/distress, no f/c n/v/d. pending placement


A/P cont current plan





Objective





- Vital Signs/Intake and Output


Vital Signs (last 24 hours): 


 











Temp Pulse Resp BP Pulse Ox


 


 98.3 F   83   18   112/73   97 


 


 07/10/17 00:00  07/10/17 00:00  07/10/17 00:00  07/10/17 00:00  07/10/17 00:00











- Medications


Medications: 


 Current Medications





Atorvastatin Calcium (Lipitor)  20 mg PO [facility]


   Last Admin: 07/09/17 21:23 Dose:  20 mg


Divalproex Sodium (Depakote Dr(*Bid*))  500 mg PO BID [facility]


   Last Admin: 07/09/17 16:06 Dose:  500 mg


Enalapril Maleate (Vasotec)  10 mg PO DAILY [facility]


   Last Admin: 07/09/17 08:32 Dose:  10 mg


Fenofibrate (Tricor)  145 mg PO DAILY [facility]


   Last Admin: 07/09/17 08:32 Dose:  145 mg


Metoprolol Tartrate (Lopressor)  50 mg PO Q12 [facility]


   Last Admin: 07/09/17 21:22 Dose:  50 mg


Quetiapine Fumarate (Seroquel)  25 mg PO HS [facility]


   Last Admin: 07/09/17 21:23 Dose:  25 mg











- Labs


Labs: 


 





 03/23/17 05:45 





 03/23/17 05:45 





 











PT  11.2 SECONDS (9.4-12.0)   12/14/15  05:20    


 


INR  1.05  (0.89-1.20)   12/14/15  05:20    


 


APTT  36.2 SECONDS (23.1-32.0)  H  12/14/15  05:20    














- Constitutional


Appears: Well, Non-toxic, No Acute Distress





- Head Exam


Head Exam: ATRAUMATIC, NORMAL INSPECTION, NORMOCEPHALIC





- Eye Exam


Eye Exam: EOMI, Normal appearance, PERRL


Pupil Exam: NORMAL ACCOMODATION, PERRL





- ENT Exam


ENT Exam: Mucous Membranes Moist, Normal Exam





- Neck Exam


Neck Exam: Full ROM, Normal Inspection.  absent: Lymphadenopathy





- Respiratory Exam


Respiratory Exam: Clear to Ausculation Bilateral, NORMAL BREATHING PATTERN





- Cardiovascular Exam


Cardiovascular Exam: REGULAR RHYTHM, RRR, +S1, +S2.  absent: Murmur





- GI/Abdominal Exam


GI & Abdominal Exam: Soft, Normal Bowel Sounds.  absent: Tenderness





- Extremities Exam


Extremities Exam: Full ROM, Normal Capillary Refill, Normal Inspection.  absent

: Joint Swelling, Pedal Edema





- Back Exam


Back Exam: NORMAL INSPECTION





- Neurological Exam


Neurological Exam: Alert, Awake, CN II-XII Intact, Normal Gait, Oriented x3





- Psychiatric Exam


Psychiatric exam: Normal Affect, Normal Mood





- Skin


Skin Exam: Dry, Intact, Normal Color, Warm





Assessment and Plan


(1) DVT prophylaxis


Status: Chronic   





(2) Scrotal edema


Status: Chronic   





(3) CAD (coronary artery disease)


Status: Chronic   





(4) Smoking


Status: Chronic   





(5) Low back pain


Status: Chronic   





(6) Prediabetes


Status: Chronic   





(7) Neurocognitive disorder


Status: Chronic

## 2017-07-11 RX ADMIN — DIVALPROEX SODIUM SCH MG: 500 TABLET, DELAYED RELEASE ORAL at 08:48

## 2017-07-11 RX ADMIN — DIVALPROEX SODIUM SCH MG: 500 TABLET, DELAYED RELEASE ORAL at 16:15

## 2017-07-11 NOTE — CP.PCM.PN
Subjective





- Date & Time of Evaluation


Date of Evaluation: 07/11/17


Time of Evaluation: 09:08





- Subjective


Subjective: 





S/O no complaints/distress, no f/c n/v/d. pending placement


A/P cont current plan





Objective





- Vital Signs/Intake and Output


Vital Signs (last 24 hours): 


 











Temp Pulse Resp BP Pulse Ox


 


 98.1 F   65   20   109/74   100 


 


 07/11/17 08:05  07/11/17 08:47  07/11/17 08:05  07/11/17 08:05  07/11/17 08:05











- Medications


Medications: 


 Current Medications





Atorvastatin Calcium (Lipitor)  20 mg PO HS [facility]


   Last Admin: 07/10/17 21:43 Dose:  20 mg


Divalproex Sodium (Depakote Dr(*Bid*))  500 mg PO BID [facility]


   Last Admin: 07/11/17 08:48 Dose:  500 mg


Enalapril Maleate (Vasotec)  10 mg PO DAILY [facility]


   Last Admin: 07/11/17 08:48 Dose:  10 mg


Fenofibrate (Tricor)  145 mg PO DAILY [facility]


   Last Admin: 07/11/17 08:49 Dose:  145 mg


Metoprolol Tartrate (Lopressor)  50 mg PO Q12 [facility]


   Last Admin: 07/11/17 08:47 Dose:  50 mg


Quetiapine Fumarate (Seroquel)  25 mg PO HS [facility]


   Last Admin: 07/10/17 21:43 Dose:  25 mg











- Labs


Labs: 


 





 03/23/17 05:45 





 03/23/17 05:45 





 











PT  11.2 SECONDS (9.4-12.0)   12/14/15  05:20    


 


INR  1.05  (0.89-1.20)   12/14/15  05:20    


 


APTT  36.2 SECONDS (23.1-32.0)  H  12/14/15  05:20    














- Constitutional


Appears: Well, Non-toxic, No Acute Distress





- Head Exam


Head Exam: ATRAUMATIC, NORMAL INSPECTION, NORMOCEPHALIC





- Eye Exam


Eye Exam: EOMI, Normal appearance, PERRL


Pupil Exam: NORMAL ACCOMODATION, PERRL





- ENT Exam


ENT Exam: Mucous Membranes Moist, Normal Exam





- Neck Exam


Neck Exam: Full ROM, Normal Inspection.  absent: Lymphadenopathy





- Respiratory Exam


Respiratory Exam: Clear to Ausculation Bilateral, NORMAL BREATHING PATTERN





- Cardiovascular Exam


Cardiovascular Exam: REGULAR RHYTHM, RRR, +S1, +S2.  absent: Murmur





- GI/Abdominal Exam


GI & Abdominal Exam: Soft, Normal Bowel Sounds.  absent: Tenderness





- Extremities Exam


Extremities Exam: Full ROM, Normal Capillary Refill, Normal Inspection.  absent

: Joint Swelling, Pedal Edema





- Back Exam


Back Exam: NORMAL INSPECTION





- Neurological Exam


Neurological Exam: Alert, Awake, CN II-XII Intact, Normal Gait, Oriented x3





- Psychiatric Exam


Psychiatric exam: Normal Affect, Normal Mood





- Skin


Skin Exam: Dry, Intact, Normal Color, Warm





Assessment and Plan


(1) DVT prophylaxis


Status: Chronic   





(2) Scrotal edema


Status: Chronic   





(3) CAD (coronary artery disease)


Status: Chronic   





(4) Smoking


Status: Chronic   





(5) Low back pain


Status: Chronic   





(6) Prediabetes


Status: Chronic   





(7) Neurocognitive disorder


Status: Chronic

## 2017-07-12 RX ADMIN — DIVALPROEX SODIUM SCH MG: 500 TABLET, DELAYED RELEASE ORAL at 08:37

## 2017-07-12 RX ADMIN — DIVALPROEX SODIUM SCH MG: 500 TABLET, DELAYED RELEASE ORAL at 16:29

## 2017-07-12 NOTE — CP.PCM.PN
Subjective





- Date & Time of Evaluation


Date of Evaluation: 07/12/17


Time of Evaluation: 07:16





- Subjective


Subjective: 








S/O no complaints/distress, no f/c n/v/d. pending placement


A/P cont current plan





Objective





- Vital Signs/Intake and Output


Vital Signs (last 24 hours): 


 











Temp Pulse Resp BP Pulse Ox


 


 98.7 F   77   19   101/66   97 


 


 07/12/17 00:31  07/12/17 00:31  07/12/17 00:31  07/12/17 00:31  07/12/17 00:31











- Medications


Medications: 


 Current Medications





Atorvastatin Calcium (Lipitor)  20 mg PO HS [facility]


   Last Admin: 07/11/17 21:37 Dose:  20 mg


Divalproex Sodium (Depakote Dr(*Bid*))  500 mg PO BID [facility]


   Last Admin: 07/11/17 16:15 Dose:  500 mg


Enalapril Maleate (Vasotec)  10 mg PO DAILY [facility]


   Last Admin: 07/11/17 08:48 Dose:  10 mg


Fenofibrate (Tricor)  145 mg PO DAILY [facility]


   Last Admin: 07/11/17 08:49 Dose:  145 mg


Metoprolol Tartrate (Lopressor)  50 mg PO Q12 [facility]


   Last Admin: 07/11/17 21:36 Dose:  50 mg


Quetiapine Fumarate (Seroquel)  25 mg PO HS [facility]


   Last Admin: 07/11/17 21:37 Dose:  25 mg











- Labs


Labs: 


 





 03/23/17 05:45 





 03/23/17 05:45 





 











PT  11.2 SECONDS (9.4-12.0)   12/14/15  05:20    


 


INR  1.05  (0.89-1.20)   12/14/15  05:20    


 


APTT  36.2 SECONDS (23.1-32.0)  H  12/14/15  05:20    














- Constitutional


Appears: Well, Non-toxic, No Acute Distress





- Head Exam


Head Exam: ATRAUMATIC, NORMAL INSPECTION, NORMOCEPHALIC





- Eye Exam


Eye Exam: EOMI, Normal appearance, PERRL


Pupil Exam: NORMAL ACCOMODATION, PERRL





- ENT Exam


ENT Exam: Mucous Membranes Moist, Normal Exam





- Neck Exam


Neck Exam: Full ROM, Normal Inspection.  absent: Lymphadenopathy





- Respiratory Exam


Respiratory Exam: Clear to Ausculation Bilateral, NORMAL BREATHING PATTERN





- Cardiovascular Exam


Cardiovascular Exam: REGULAR RHYTHM, RRR, +S1, +S2.  absent: Murmur





- GI/Abdominal Exam


GI & Abdominal Exam: Soft, Normal Bowel Sounds.  absent: Tenderness





- Extremities Exam


Extremities Exam: Full ROM, Normal Capillary Refill, Normal Inspection.  absent

: Joint Swelling, Pedal Edema





- Back Exam


Back Exam: NORMAL INSPECTION





- Neurological Exam


Neurological Exam: Alert, Awake, CN II-XII Intact, Normal Gait, Oriented x3





- Psychiatric Exam


Psychiatric exam: Normal Affect, Normal Mood





- Skin


Skin Exam: Dry, Intact, Normal Color, Warm





Assessment and Plan


(1) DVT prophylaxis


Status: Chronic   





(2) Scrotal edema


Status: Chronic   





(3) CAD (coronary artery disease)


Status: Chronic   





(4) Smoking


Status: Chronic   





(5) Low back pain


Status: Chronic   





(6) Prediabetes


Status: Chronic   





(7) Neurocognitive disorder


Status: Chronic

## 2017-07-13 LAB
ALBUMIN SERPL-MCNC: 4.1 G/DL (ref 3.5–5)
ALBUMIN/GLOB SERPL: 1 {RATIO} (ref 1–2.1)
ALT SERPL-CCNC: 32 U/L (ref 21–72)
AST SERPL-CCNC: 19 U/L (ref 17–59)
BASOPHILS # BLD AUTO: 0 K/UL (ref 0–0.2)
BASOPHILS NFR BLD: 0.3 % (ref 0–2)
BUN SERPL-MCNC: 24 MG/DL (ref 9–20)
CALCIUM SERPL-MCNC: 9.3 MG/DL (ref 8.4–10.2)
EOSINOPHIL # BLD AUTO: 0.4 K/UL (ref 0–0.7)
EOSINOPHIL NFR BLD: 4.8 % (ref 0–4)
ERYTHROCYTE [DISTWIDTH] IN BLOOD BY AUTOMATED COUNT: 13.5 % (ref 11.5–14.5)
GFR NON-AFRICAN AMERICAN: 57
HDLC SERPL-MCNC: 27 MG/DL (ref 30–70)
HGB BLD-MCNC: 12.9 G/DL (ref 12–18)
LDLC SERPL-MCNC: 92 MG/DL (ref 0–129)
LYMPHOCYTES # BLD AUTO: 3.7 K/UL (ref 1–4.3)
LYMPHOCYTES NFR BLD AUTO: 42.1 % (ref 20–40)
MCH RBC QN AUTO: 29.9 PG (ref 27–31)
MCHC RBC AUTO-ENTMCNC: 32.5 G/DL (ref 33–37)
MCV RBC AUTO: 92.1 FL (ref 80–94)
MONOCYTES # BLD: 0.8 K/UL (ref 0–0.8)
MONOCYTES NFR BLD: 9.1 % (ref 0–10)
NEUTROPHILS # BLD: 3.8 K/UL (ref 1.8–7)
NEUTROPHILS NFR BLD AUTO: 43.7 % (ref 50–75)
NRBC BLD AUTO-RTO: 0 % (ref 0–0)
PLATELET # BLD: 234 K/UL (ref 130–400)
PMV BLD AUTO: 7.4 FL (ref 7.2–11.7)
RBC # BLD AUTO: 4.31 MIL/UL (ref 4.4–5.9)
WBC # BLD AUTO: 8.7 K/UL (ref 4.8–10.8)

## 2017-07-13 RX ADMIN — DIVALPROEX SODIUM SCH MG: 500 TABLET, DELAYED RELEASE ORAL at 17:31

## 2017-07-13 RX ADMIN — DIVALPROEX SODIUM SCH MG: 500 TABLET, DELAYED RELEASE ORAL at 09:41

## 2017-07-13 NOTE — CP.PCM.PN
Subjective





- Date & Time of Evaluation


Date of Evaluation: 07/13/17


Time of Evaluation: 07:12





- Subjective


Subjective: 





S/O no complaints/distress, no f/c n/v/d. pending placement


A/P cont current plan





Objective





- Vital Signs/Intake and Output


Vital Signs (last 24 hours): 


 











Temp Pulse Resp BP Pulse Ox


 


 98 F   69   18   100/66   97 


 


 07/13/17 00:00  07/13/17 00:00  07/13/17 00:00  07/13/17 00:00  07/13/17 00:00











- Medications


Medications: 


 Current Medications





Atorvastatin Calcium (Lipitor)  20 mg PO [facility]


   Last Admin: 07/12/17 21:12 Dose:  20 mg


Divalproex Sodium (Depakote Dr(*Bid*))  500 mg PO BID [facility]


   Last Admin: 07/12/17 16:29 Dose:  500 mg


Enalapril Maleate (Vasotec)  10 mg PO DAILY [facility]


   Last Admin: 07/12/17 08:37 Dose:  10 mg


Fenofibrate (Tricor)  145 mg PO DAILY [facility]


   Last Admin: 07/12/17 08:37 Dose:  145 mg


Metoprolol Tartrate (Lopressor)  50 mg PO Q12 [facility]


   Last Admin: 07/12/17 21:12 Dose:  50 mg


Quetiapine Fumarate (Seroquel)  25 mg PO HS [facility]


   Last Admin: 07/12/17 21:12 Dose:  25 mg











- Labs


Labs: 


 





 07/13/17 04:20 





 07/13/17 04:20 





 











PT  11.2 SECONDS (9.4-12.0)   12/14/15  05:20    


 


INR  1.05  (0.89-1.20)   12/14/15  05:20    


 


APTT  36.2 SECONDS (23.1-32.0)  H  12/14/15  05:20    














- Constitutional


Appears: Well, Non-toxic, No Acute Distress





- Head Exam


Head Exam: ATRAUMATIC, NORMAL INSPECTION, NORMOCEPHALIC





- Eye Exam


Eye Exam: EOMI, Normal appearance, PERRL


Pupil Exam: NORMAL ACCOMODATION, PERRL





- ENT Exam


ENT Exam: Mucous Membranes Moist, Normal Exam





- Neck Exam


Neck Exam: Full ROM, Normal Inspection.  absent: Lymphadenopathy





- Respiratory Exam


Respiratory Exam: Clear to Ausculation Bilateral, NORMAL BREATHING PATTERN





- Cardiovascular Exam


Cardiovascular Exam: REGULAR RHYTHM, RRR, +S1, +S2.  absent: Murmur





- GI/Abdominal Exam


GI & Abdominal Exam: Soft, Normal Bowel Sounds.  absent: Tenderness





- Extremities Exam


Extremities Exam: Full ROM, Normal Capillary Refill, Normal Inspection.  absent

: Joint Swelling, Pedal Edema





- Back Exam


Back Exam: NORMAL INSPECTION





- Neurological Exam


Neurological Exam: Alert, Awake, CN II-XII Intact, Normal Gait, Oriented x3





- Psychiatric Exam


Psychiatric exam: Normal Affect, Normal Mood





- Skin


Skin Exam: Dry, Intact, Normal Color, Warm





Assessment and Plan


(1) DVT prophylaxis


Status: Chronic   





(2) Scrotal edema


Status: Chronic   





(3) CAD (coronary artery disease)


Status: Chronic   





(4) Smoking


Status: Chronic   





(5) Low back pain


Status: Chronic   





(6) Prediabetes


Status: Chronic   





(7) Neurocognitive disorder


Status: Chronic

## 2017-07-14 RX ADMIN — DIVALPROEX SODIUM SCH MG: 500 TABLET, DELAYED RELEASE ORAL at 10:52

## 2017-07-14 RX ADMIN — DIVALPROEX SODIUM SCH MG: 500 TABLET, DELAYED RELEASE ORAL at 17:00

## 2017-07-15 RX ADMIN — DIVALPROEX SODIUM SCH MG: 500 TABLET, DELAYED RELEASE ORAL at 09:02

## 2017-07-15 RX ADMIN — DIVALPROEX SODIUM SCH MG: 500 TABLET, DELAYED RELEASE ORAL at 16:28

## 2017-07-15 NOTE — CP.PCM.PN
Subjective





- Date & Time of Evaluation


Date of Evaluation: 07/15/17


Time of Evaluation: 05:00





- Subjective


Subjective: 





pt seen and examined at bedside. no new complaints. 





Objective





- Vital Signs/Intake and Output


Vital Signs (last 24 hours): 


 











Temp Pulse Resp BP Pulse Ox


 


 98.7 F   67   20   105/68   97 


 


 07/15/17 16:31  07/15/17 16:31  07/15/17 16:31  07/15/17 16:31  07/15/17 16:31











- Medications


Medications: 


 Current Medications





Atorvastatin Calcium (Lipitor)  20 mg PO [facility]


   Last Admin: 07/14/17 21:11 Dose:  20 mg


Divalproex Sodium (Depakote Dr(*Bid*))  500 mg PO BID [facility]


   Last Admin: 07/15/17 16:28 Dose:  500 mg


Enalapril Maleate (Vasotec)  10 mg PO DAILY [facility]


   Last Admin: 07/15/17 09:04 Dose:  10 mg


Fenofibrate (Tricor)  145 mg PO DAILY [facility]


   Last Admin: 07/15/17 09:04 Dose:  145 mg


Metoprolol Tartrate (Lopressor)  50 mg PO Q12 [facility]


   Last Admin: 07/15/17 09:02 Dose:  50 mg


Quetiapine Fumarate (Seroquel)  25 mg PO [facility]


   Last Admin: 07/14/17 21:09 Dose:  25 mg











- Labs


Labs: 


 





 07/13/17 04:20 





 07/13/17 04:20 





 











PT  11.2 SECONDS (9.4-12.0)   12/14/15  05:20    


 


INR  1.05  (0.89-1.20)   12/14/15  05:20    


 


APTT  36.2 SECONDS (23.1-32.0)  H  12/14/15  05:20    














- Constitutional


Appears: No Acute Distress





- Head Exam


Head Exam: ATRAUMATIC, NORMAL INSPECTION





- Eye Exam


Eye Exam: EOMI, Normal appearance


Pupil Exam: NORMAL ACCOMODATION





- ENT Exam


ENT Exam: Mucous Membranes Moist





- Neck Exam


Neck Exam: Full ROM, Normal Inspection





- Respiratory Exam


Respiratory Exam: Clear to Ausculation Bilateral, NORMAL BREATHING PATTERN





- Cardiovascular Exam


Cardiovascular Exam: REGULAR RHYTHM





- GI/Abdominal Exam


GI & Abdominal Exam: Normal Bowel Sounds





- Rectal Exam


Rectal Exam: NORMAL INSPECTION





- Extremities Exam


Extremities Exam: Full ROM, Normal Inspection





- Back Exam


Back Exam: NORMAL INSPECTION





- Neurological Exam


Neurological Exam: Alert





Assessment and Plan





- Assessment and Plan (Free Text)


Assessment: 








Assessment and Plan


(1) DVT prophylaxis


Status: Chronic   





(2) Scrotal edema


Status: Chronic   





(3) CAD (coronary artery disease)


Status: Chronic   





(4) Smoking


Status: Chronic   





(5) Low back pain


Status: Chronic   





(6) Prediabetes


Status: Chronic   





(7) Neurocognitive disorder


Status: Chronic

## 2017-07-15 NOTE — CP.PCM.PN
Subjective





- Date & Time of Evaluation


Date of Evaluation: 07/14/17


Time of Evaluation: 11:00





- Subjective


Subjective: 





S/O no complaints/distress, no f/c n/v/d. pending placement


A/P cont current plan


updated labs noted





Objective





- Vital Signs/Intake and Output


Vital Signs (last 24 hours): 


 











Temp Pulse Resp BP Pulse Ox


 


 98.1 F   79   18   113/76   100 


 


 07/14/17 16:04  07/14/17 21:08  07/14/17 16:04  07/14/17 21:08  07/14/17 16:04











- Medications


Medications: 


 Current Medications





Atorvastatin Calcium (Lipitor)  20 mg PO HS [facility]


   Last Admin: 07/14/17 21:11 Dose:  20 mg


Divalproex Sodium (Depakote Dr(*Bid*))  500 mg PO BID [facility]


   Last Admin: 07/14/17 17:00 Dose:  500 mg


Enalapril Maleate (Vasotec)  10 mg PO DAILY [facility]


   Last Admin: 07/14/17 10:55 Dose:  Not Given


Fenofibrate (Tricor)  145 mg PO DAILY [facility]


   Last Admin: 07/14/17 10:55 Dose:  145 mg


Metoprolol Tartrate (Lopressor)  50 mg PO Q12 [facility]


   Last Admin: 07/14/17 21:08 Dose:  50 mg


Quetiapine Fumarate (Seroquel)  25 mg PO HS [facility]


   Last Admin: 07/14/17 21:09 Dose:  25 mg











- Labs


Labs: 


 





 07/13/17 04:20 





 07/13/17 04:20 





 











PT  11.2 SECONDS (9.4-12.0)   12/14/15  05:20    


 


INR  1.05  (0.89-1.20)   12/14/15  05:20    


 


APTT  36.2 SECONDS (23.1-32.0)  H  12/14/15  05:20    














- Constitutional


Appears: Well, Non-toxic, No Acute Distress





- Head Exam


Head Exam: ATRAUMATIC, NORMAL INSPECTION, NORMOCEPHALIC





- Eye Exam


Eye Exam: EOMI, Normal appearance, PERRL


Pupil Exam: NORMAL ACCOMODATION, PERRL





- ENT Exam


ENT Exam: Mucous Membranes Moist, Normal Exam





- Neck Exam


Neck Exam: Full ROM, Normal Inspection.  absent: Lymphadenopathy





- Respiratory Exam


Respiratory Exam: Clear to Ausculation Bilateral, NORMAL BREATHING PATTERN





- Cardiovascular Exam


Cardiovascular Exam: REGULAR RHYTHM, +S1, +S2.  absent: Murmur





- GI/Abdominal Exam


GI & Abdominal Exam: Soft, Normal Bowel Sounds.  absent: Tenderness





- Rectal Exam


Rectal Exam: NORMAL INSPECTION





-  Exam


 Exam: Circumcision, NORMAL INSPECTION


External exam: NORMAL EXTERNAL EXAM


Speculum exam: NORMAL SPECULUM EXAM


Bimanual exam: NORMAL BIMANUAL EXAM





- Extremities Exam


Extremities Exam: Full ROM, Normal Capillary Refill, Normal Inspection.  absent

: Joint Swelling, Pedal Edema





- Back Exam


Back Exam: NORMAL INSPECTION





- Neurological Exam


Neurological Exam: Alert, Awake, CN II-XII Intact, Normal Gait, Oriented x3





- Psychiatric Exam


Psychiatric exam: Normal Affect, Normal Mood





- Skin


Skin Exam: Dry, Intact, Normal Color, Warm





Assessment and Plan


(1) DVT prophylaxis


Status: Chronic   





(2) Scrotal edema


Status: Chronic   





(3) CAD (coronary artery disease)


Status: Chronic   





(4) Smoking


Status: Chronic   





(5) Low back pain


Status: Chronic   





(6) Prediabetes


Status: Chronic   





(7) Neurocognitive disorder


Status: Chronic

## 2017-07-16 RX ADMIN — DIVALPROEX SODIUM SCH MG: 500 TABLET, DELAYED RELEASE ORAL at 08:51

## 2017-07-16 RX ADMIN — DIVALPROEX SODIUM SCH MG: 500 TABLET, DELAYED RELEASE ORAL at 17:21

## 2017-07-16 NOTE — CP.PCM.PN
Subjective





- Date & Time of Evaluation


Date of Evaluation: 07/16/17


Time of Evaluation: 05:00





- Subjective


Subjective: 





pt seen and examined at bedside. no new complaints. 





Objective





- Vital Signs/Intake and Output


Vital Signs (last 24 hours): 


 











Temp Pulse Resp BP Pulse Ox


 


 97.8 F   62   20   108/71   97 


 


 07/16/17 07:23  07/16/17 08:52  07/16/17 07:23  07/16/17 08:52  07/16/17 07:23











- Medications


Medications: 


 Current Medications





Atorvastatin Calcium (Lipitor)  20 mg PO [facility]


   Last Admin: 07/15/17 21:38 Dose:  20 mg


Divalproex Sodium (Depakote Dr(*Bid*))  500 mg PO BID [facility]


   Last Admin: 07/16/17 08:51 Dose:  500 mg


Enalapril Maleate (Vasotec)  10 mg PO DAILY [facility]


   Last Admin: 07/15/17 09:04 Dose:  10 mg


Fenofibrate (Tricor)  145 mg PO DAILY [facility]


   Last Admin: 07/16/17 08:51 Dose:  145 mg


Metoprolol Tartrate (Lopressor)  50 mg PO Q12 [facility]


   Last Admin: 07/16/17 08:52 Dose:  50 mg


Quetiapine Fumarate (Seroquel)  25 mg PO [facility]


   Last Admin: 07/15/17 21:38 Dose:  25 mg











- Labs


Labs: 


 





 07/13/17 04:20 





 07/13/17 04:20 





 











PT  11.2 SECONDS (9.4-12.0)   12/14/15  05:20    


 


INR  1.05  (0.89-1.20)   12/14/15  05:20    


 


APTT  36.2 SECONDS (23.1-32.0)  H  12/14/15  05:20    














- Constitutional


Appears: Well, Non-toxic





- Head Exam


Head Exam: NORMAL INSPECTION





- Eye Exam


Eye Exam: EOMI, Normal appearance


Pupil Exam: PERRL





- ENT Exam


ENT Exam: Mucous Membranes Moist





- Neck Exam


Neck Exam: Full ROM





- Respiratory Exam


Respiratory Exam: Clear to Ausculation Bilateral, NORMAL BREATHING PATTERN





- Cardiovascular Exam


Cardiovascular Exam: REGULAR RHYTHM, +S1, +S2





- Extremities Exam


Extremities Exam: Full ROM, Normal Inspection





Assessment and Plan





- Assessment and Plan (Free Text)


Assessment: 








Assessment and Plan





- Assessment and Plan (Free Text)


Assessment: 








Assessment and Plan


(1) DVT prophylaxis


Status: Chronic   





(2) Scrotal edema


Status: Chronic   





(3) CAD (coronary artery disease)


Status: Chronic   





(4) Smoking


Status: Chronic   





(5) Low back pain


Status: Chronic   





(6) Prediabetes


Status: Chronic   





(7) Neurocognitive disorder


Status: Chronic

## 2017-07-17 RX ADMIN — DIVALPROEX SODIUM SCH MG: 500 TABLET, DELAYED RELEASE ORAL at 16:36

## 2017-07-17 RX ADMIN — DIVALPROEX SODIUM SCH MG: 500 TABLET, DELAYED RELEASE ORAL at 08:49

## 2017-07-17 NOTE — CP.PCM.PN
Subjective





- Date & Time of Evaluation


Date of Evaluation: 07/17/17


Time of Evaluation: 08:57





- Subjective


Subjective: 





S/O no complaints/distress, no f/c n/v/d. pending placement


A/P cont current plan





Objective





- Vital Signs/Intake and Output


Vital Signs (last 24 hours): 


 











Temp Pulse Resp BP Pulse Ox


 


 97.4 F L  62   20   113/75   95 


 


 07/17/17 07:46  07/17/17 08:50  07/17/17 07:46  07/17/17 08:50  07/17/17 07:46











- Medications


Medications: 


 Current Medications





Atorvastatin Calcium (Lipitor)  20 mg PO HS [facility]


   Last Admin: 07/16/17 21:22 Dose:  20 mg


Divalproex Sodium (Depakote Dr(*Bid*))  500 mg PO BID [facility]


   Last Admin: 07/17/17 08:49 Dose:  500 mg


Enalapril Maleate (Vasotec)  10 mg PO DAILY [facility]


   Last Admin: 07/17/17 08:50 Dose:  10 mg


Fenofibrate (Tricor)  145 mg PO DAILY [facility]


   Last Admin: 07/17/17 08:50 Dose:  145 mg


Metoprolol Tartrate (Lopressor)  50 mg PO Q12 [facility]


   Last Admin: 07/17/17 08:50 Dose:  50 mg


Quetiapine Fumarate (Seroquel)  25 mg PO HS [facility]


   Last Admin: 07/16/17 21:22 Dose:  25 mg











- Labs


Labs: 


 





 07/13/17 04:20 





 07/13/17 04:20 





 











PT  11.2 SECONDS (9.4-12.0)   12/14/15  05:20    


 


INR  1.05  (0.89-1.20)   12/14/15  05:20    


 


APTT  36.2 SECONDS (23.1-32.0)  H  12/14/15  05:20    














- Constitutional


Appears: Well, Non-toxic, No Acute Distress





- Head Exam


Head Exam: ATRAUMATIC, NORMAL INSPECTION, NORMOCEPHALIC





- Eye Exam


Eye Exam: EOMI, Normal appearance, PERRL


Pupil Exam: NORMAL ACCOMODATION, PERRL





- ENT Exam


ENT Exam: Mucous Membranes Moist, Normal Exam





- Neck Exam


Neck Exam: Full ROM, Normal Inspection.  absent: Lymphadenopathy





- Respiratory Exam


Respiratory Exam: Clear to Ausculation Bilateral, NORMAL BREATHING PATTERN





- Cardiovascular Exam


Cardiovascular Exam: REGULAR RHYTHM, RRR, +S1, +S2.  absent: Murmur





- GI/Abdominal Exam


GI & Abdominal Exam: Soft, Normal Bowel Sounds.  absent: Tenderness





- Extremities Exam


Extremities Exam: Full ROM, Normal Capillary Refill, Normal Inspection.  absent

: Joint Swelling, Pedal Edema





- Back Exam


Back Exam: NORMAL INSPECTION





- Neurological Exam


Neurological Exam: Alert, Awake, CN II-XII Intact, Normal Gait, Oriented x3





- Psychiatric Exam


Psychiatric exam: Normal Affect, Normal Mood





- Skin


Skin Exam: Dry, Intact, Normal Color, Warm





Assessment and Plan


(1) DVT prophylaxis


Status: Chronic   





(2) Scrotal edema


Status: Chronic   





(3) CAD (coronary artery disease)


Status: Chronic   





(4) Smoking


Status: Chronic   





(5) Low back pain


Status: Chronic   





(6) Prediabetes


Status: Chronic   





(7) Neurocognitive disorder


Status: Chronic

## 2017-07-18 RX ADMIN — DIVALPROEX SODIUM SCH MG: 500 TABLET, DELAYED RELEASE ORAL at 09:24

## 2017-07-18 RX ADMIN — DIVALPROEX SODIUM SCH MG: 500 TABLET, DELAYED RELEASE ORAL at 17:17

## 2017-07-18 NOTE — CP.PCM.PN
Subjective





- Date & Time of Evaluation


Date of Evaluation: 07/18/17


Time of Evaluation: 07:30





- Subjective


Subjective: 





S/O no complaints/distress, no f/c n/v/d. pending placement


A/P cont current plan





Objective





- Vital Signs/Intake and Output


Vital Signs (last 24 hours): 


 











Temp Pulse Resp BP Pulse Ox


 


 97.5 F L  60   20   109/73   99 


 


 07/18/17 07:24  07/18/17 07:24  07/18/17 07:24  07/18/17 07:24  07/18/17 07:24











- Medications


Medications: 


 Current Medications





Atorvastatin Calcium (Lipitor)  20 mg PO HS [facility]


   Last Admin: 07/17/17 21:25 Dose:  20 mg


Divalproex Sodium (Depakote Dr(*Bid*))  500 mg PO BID [facility]


   Last Admin: 07/17/17 16:36 Dose:  500 mg


Enalapril Maleate (Vasotec)  10 mg PO DAILY [facility]


   Last Admin: 07/17/17 08:50 Dose:  10 mg


Fenofibrate (Tricor)  145 mg PO DAILY [facility]


   Last Admin: 07/17/17 08:50 Dose:  145 mg


Metoprolol Tartrate (Lopressor)  50 mg PO Q12 [facility]


   Last Admin: 07/17/17 21:25 Dose:  50 mg


Quetiapine Fumarate (Seroquel)  25 mg PO HS [facility]


   Last Admin: 07/17/17 21:25 Dose:  25 mg











- Labs


Labs: 


 





 07/13/17 04:20 





 07/13/17 04:20 





 











PT  11.2 SECONDS (9.4-12.0)   12/14/15  05:20    


 


INR  1.05  (0.89-1.20)   12/14/15  05:20    


 


APTT  36.2 SECONDS (23.1-32.0)  H  12/14/15  05:20    














- Constitutional


Appears: Well, Non-toxic, No Acute Distress





- Head Exam


Head Exam: ATRAUMATIC, NORMAL INSPECTION, NORMOCEPHALIC





- Eye Exam


Eye Exam: EOMI, Normal appearance, PERRL


Pupil Exam: NORMAL ACCOMODATION, PERRL





- ENT Exam


ENT Exam: Mucous Membranes Moist, Normal Exam





- Neck Exam


Neck Exam: Full ROM, Normal Inspection.  absent: Lymphadenopathy





- Respiratory Exam


Respiratory Exam: Clear to Ausculation Bilateral, NORMAL BREATHING PATTERN





- Cardiovascular Exam


Cardiovascular Exam: REGULAR RHYTHM, RRR, +S1, +S2.  absent: Murmur





- GI/Abdominal Exam


GI & Abdominal Exam: Soft, Normal Bowel Sounds.  absent: Tenderness





- Extremities Exam


Extremities Exam: Full ROM, Normal Capillary Refill, Normal Inspection.  absent

: Joint Swelling, Pedal Edema





- Back Exam


Back Exam: NORMAL INSPECTION





- Neurological Exam


Neurological Exam: Alert, Awake, CN II-XII Intact, Normal Gait, Oriented x3





- Psychiatric Exam


Psychiatric exam: Normal Affect, Normal Mood





- Skin


Skin Exam: Dry, Intact, Normal Color, Warm





Assessment and Plan


(1) DVT prophylaxis


Status: Chronic   





(2) Scrotal edema


Status: Chronic   





(3) CAD (coronary artery disease)


Status: Chronic   





(4) Smoking


Status: Chronic   





(5) Low back pain


Status: Chronic   





(6) Prediabetes


Status: Chronic   





(7) Neurocognitive disorder


Status: Chronic

## 2017-07-19 RX ADMIN — DIVALPROEX SODIUM SCH MG: 500 TABLET, DELAYED RELEASE ORAL at 17:00

## 2017-07-19 RX ADMIN — DIVALPROEX SODIUM SCH MG: 500 TABLET, DELAYED RELEASE ORAL at 09:12

## 2017-07-19 NOTE — CP.PCM.PN
Subjective





- Date & Time of Evaluation


Date of Evaluation: 07/19/17


Time of Evaluation: 09:51





- Subjective


Subjective: 





S/O no complaints/distress, no f/c n/v/d. pending placement


A/P cont current plan





Objective





- Vital Signs/Intake and Output


Vital Signs (last 24 hours): 


 











Temp Pulse Resp BP Pulse Ox


 


 97.8 F   62   18   150/68   97 


 


 07/19/17 08:11  07/19/17 09:12  07/19/17 08:11  07/19/17 09:12  07/19/17 08:11











- Medications


Medications: 


 Current Medications





Atorvastatin Calcium (Lipitor)  [facility]


   Last Admin: 07/18/17 22:44 Dose:  20 mg


Divalproex Sodium (Depakote Dr(*Bid*))  500 mg PO BID [facility]


   Last Admin: 07/19/17 09:12 Dose:  500 mg


Enalapril Maleate (Vasotec)  10 mg PO DAILY [facility]


   Last Admin: 07/19/17 09:13 Dose:  10 mg


Fenofibrate (Tricor)  145 mg PO [facility]ILY [facility]


   Last Admin: 07/19/17 09:13 Dose:  145 mg


Metoprolol Tartrate (Lopressor)  50 mg PO Q12 [facility]


   Last Admin: 07/19/17 09:12 Dose:  50 mg


Quetiapine Fumarate (Seroquel)  25 mg PO HS [facility]


   Last Admin: 07/18/17 22:45 Dose:  25 mg











- Labs


Labs: 


 





 07/13/17 04:20 





 07/13/17 04:20 





 











PT  11.2 SECONDS (9.4-12.0)   12/14/15  05:20    


 


INR  1.05  (0.89-1.20)   12/14/15  05:20    


 


APTT  36.2 SECONDS (23.1-32.0)  H  12/14/15  05:20    














- Constitutional


Appears: Well, Non-toxic, No Acute Distress





- Head Exam


Head Exam: ATRAUMATIC, NORMAL INSPECTION, NORMOCEPHALIC





- Eye Exam


Eye Exam: EOMI, Normal appearance, PERRL


Pupil Exam: NORMAL ACCOMODATION, PERRL





- ENT Exam


ENT Exam: Mucous Membranes Moist, Normal Exam





- Neck Exam


Neck Exam: Full ROM, Normal Inspection.  absent: Lymphadenopathy





- Respiratory Exam


Respiratory Exam: Clear to Ausculation Bilateral, NORMAL BREATHING PATTERN





- Cardiovascular Exam


Cardiovascular Exam: REGULAR RHYTHM, RRR, +S1, +S2.  absent: Murmur





- GI/Abdominal Exam


GI & Abdominal Exam: Soft, Normal Bowel Sounds.  absent: Tenderness





- Extremities Exam


Extremities Exam: Full ROM, Normal Capillary Refill, Normal Inspection.  absent

: Joint Swelling, Pedal Edema





- Back Exam


Back Exam: NORMAL INSPECTION





- Neurological Exam


Neurological Exam: Alert, Awake, CN II-XII Intact, Normal Gait, Oriented x3





- Psychiatric Exam


Psychiatric exam: Normal Affect, Normal Mood





- Skin


Skin Exam: Dry, Intact, Normal Color, Warm





Assessment and Plan


(1) DVT prophylaxis


Status: Chronic   





(2) Scrotal edema


Status: Chronic   





(3) CAD (coronary artery disease)


Status: Chronic   





(4) Smoking


Status: Chronic   





(5) Low back pain


Status: Chronic   





(6) Prediabetes


Status: Chronic   





(7) Neurocognitive disorder


Status: Chronic

## 2017-07-20 RX ADMIN — DIVALPROEX SODIUM SCH MG: 500 TABLET, DELAYED RELEASE ORAL at 17:22

## 2017-07-20 RX ADMIN — DIVALPROEX SODIUM SCH MG: 500 TABLET, DELAYED RELEASE ORAL at 09:02

## 2017-07-20 NOTE — CP.PCM.PN
Subjective





- Date & Time of Evaluation


Date of Evaluation: 07/20/17


Time of Evaluation: 07:18





- Subjective


Subjective: 





S/O no complaints/distress, no f/c n/v/d. pending placement


A/P cont current plan





Objective





- Vital Signs/Intake and Output


Vital Signs (last 24 hours): 


 











Temp Pulse Resp BP Pulse Ox


 


 97.2 F L  88   20   110/85   99 


 


 07/20/17 01:04  07/20/17 01:04  07/20/17 01:04  07/20/17 01:04  07/20/17 01:04











- Medications


Medications: 


 Current Medications





Atorvastatin Calcium (Lipitor)  20 mg PO HS [facility]


   Last Admin: 07/19/17 21:28 Dose:  20 mg


Divalproex Sodium (Depakote Dr(*Bid*))  500 mg PO BID [facility]


   Last Admin: 07/19/17 17:00 Dose:  500 mg


Enalapril Maleate (Vasotec)  10 mg PO DAILY [facility]


   Last Admin: 07/19/17 09:13 Dose:  10 mg


Fenofibrate (Tricor)  145 mg PO DAILY [facility]


   Last Admin: 07/19/17 09:13 Dose:  145 mg


Metoprolol Tartrate (Lopressor)  50 mg PO Q12 [facility]


   Last Admin: 07/19/17 21:29 Dose:  50 mg


Quetiapine Fumarate (Seroquel)  25 mg PO HS [facility]


   Last Admin: 07/19/17 21:29 Dose:  25 mg











- Labs


Labs: 


 





 07/13/17 04:20 





 07/13/17 04:20 





 











PT  11.2 SECONDS (9.4-12.0)   12/14/15  05:20    


 


INR  1.05  (0.89-1.20)   12/14/15  05:20    


 


APTT  36.2 SECONDS (23.1-32.0)  H  12/14/15  05:20    














- Constitutional


Appears: Well, Non-toxic, No Acute Distress





- Head Exam


Head Exam: ATRAUMATIC, NORMAL INSPECTION, NORMOCEPHALIC





- Eye Exam


Eye Exam: EOMI, Normal appearance, PERRL


Pupil Exam: NORMAL ACCOMODATION, PERRL





- ENT Exam


ENT Exam: Mucous Membranes Moist, Normal Exam





- Neck Exam


Neck Exam: Full ROM, Normal Inspection.  absent: Lymphadenopathy





- Respiratory Exam


Respiratory Exam: Clear to Ausculation Bilateral, NORMAL BREATHING PATTERN





- Cardiovascular Exam


Cardiovascular Exam: REGULAR RHYTHM, RRR, +S1, +S2.  absent: Murmur





- GI/Abdominal Exam


GI & Abdominal Exam: Soft, Normal Bowel Sounds.  absent: Tenderness





- Extremities Exam


Extremities Exam: Full ROM, Normal Capillary Refill, Normal Inspection.  absent

: Joint Swelling, Pedal Edema





- Back Exam


Back Exam: NORMAL INSPECTION





- Neurological Exam


Neurological Exam: Alert, Awake, CN II-XII Intact, Normal Gait, Oriented x3





- Psychiatric Exam


Psychiatric exam: Normal Affect, Normal Mood





- Skin


Skin Exam: Dry, Intact, Normal Color, Warm





Assessment and Plan


(1) DVT prophylaxis


Status: Chronic   





(2) Scrotal edema


Status: Chronic   





(3) CAD (coronary artery disease)


Status: Chronic   





(4) Smoking


Status: Chronic   





(5) Low back pain


Status: Chronic   





(6) Prediabetes


Status: Chronic   





(7) Neurocognitive disorder


Status: Chronic

## 2017-07-21 RX ADMIN — DIVALPROEX SODIUM SCH MG: 500 TABLET, DELAYED RELEASE ORAL at 18:01

## 2017-07-21 RX ADMIN — DIVALPROEX SODIUM SCH MG: 500 TABLET, DELAYED RELEASE ORAL at 11:11

## 2017-07-21 NOTE — CP.PCM.PN
Subjective





- Date & Time of Evaluation


Date of Evaluation: 07/21/17


Time of Evaluation: 11:36





- Subjective


Subjective: 





S/O no complaints/distress, no f/c n/v/d. pending placement


A/P cont current plan





Objective





- Vital Signs/Intake and Output


Vital Signs (last 24 hours): 


 











Temp Pulse Resp BP Pulse Ox


 


 97 F L  68   20   109/72   95 


 


 07/21/17 08:15  07/21/17 11:10  07/21/17 08:15  07/21/17 11:10  07/21/17 08:15











- Medications


Medications: 


 Current Medications





Atorvastatin Calcium (Lipitor)  20 mg PO HS [facility]


   Last Admin: 07/20/17 21:44 Dose:  20 mg


Divalproex Sodium (Depakote Dr(*Bid*))  500 mg PO BID [facility]


   Last Admin: 07/21/17 11:11 Dose:  500 mg


Enalapril Maleate (Vasotec)  10 mg PO DAILY [facility]


   Last Admin: 07/21/17 11:11 Dose:  10 mg


Fenofibrate (Tricor)  145 mg PO DAILY [facility]


   Last Admin: 07/21/17 11:11 Dose:  145 mg


Metoprolol Tartrate (Lopressor)  50 mg PO Q12 [facility]


   Last Admin: 07/21/17 11:10 Dose:  50 mg


Quetiapine Fumarate (Seroquel)  25 mg PO HS [facility]


   Last Admin: 07/20/17 21:44 Dose:  25 mg











- Labs


Labs: 


 





 07/13/17 04:20 





 07/13/17 04:20 





 











PT  11.2 SECONDS (9.4-12.0)   12/14/15  05:20    


 


INR  1.05  (0.89-1.20)   12/14/15  05:20    


 


APTT  36.2 SECONDS (23.1-32.0)  H  12/14/15  05:20    














- Constitutional


Appears: Well, Non-toxic, No Acute Distress





- Head Exam


Head Exam: ATRAUMATIC, NORMAL INSPECTION, NORMOCEPHALIC





- Eye Exam


Eye Exam: EOMI, Normal appearance, PERRL


Pupil Exam: NORMAL ACCOMODATION, PERRL





- ENT Exam


ENT Exam: Mucous Membranes Moist, Normal Exam





- Neck Exam


Neck Exam: Full ROM, Normal Inspection.  absent: Lymphadenopathy





- Respiratory Exam


Respiratory Exam: Clear to Ausculation Bilateral, NORMAL BREATHING PATTERN





- Cardiovascular Exam


Cardiovascular Exam: REGULAR RHYTHM, RRR, +S1, +S2.  absent: Murmur





- GI/Abdominal Exam


GI & Abdominal Exam: Soft, Normal Bowel Sounds.  absent: Tenderness





- Rectal Exam


Rectal Exam: NORMAL INSPECTION





- Extremities Exam


Extremities Exam: Full ROM, Normal Capillary Refill, Normal Inspection.  absent

: Joint Swelling, Pedal Edema





- Back Exam


Back Exam: NORMAL INSPECTION





- Neurological Exam


Neurological Exam: Alert, Awake, CN II-XII Intact, Normal Gait, Oriented x3





- Psychiatric Exam


Psychiatric exam: Normal Affect, Normal Mood





- Skin


Skin Exam: Dry, Intact, Normal Color, Warm





Assessment and Plan


(1) DVT prophylaxis


Status: Chronic   





(2) Scrotal edema


Status: Chronic   





(3) CAD (coronary artery disease)


Status: Chronic   





(4) Smoking


Status: Chronic   





(5) Low back pain


Status: Chronic   





(6) Prediabetes


Status: Chronic   





(7) Neurocognitive disorder


Status: Chronic

## 2017-07-22 RX ADMIN — DIVALPROEX SODIUM SCH MG: 500 TABLET, DELAYED RELEASE ORAL at 09:40

## 2017-07-22 RX ADMIN — DIVALPROEX SODIUM SCH MG: 500 TABLET, DELAYED RELEASE ORAL at 17:33

## 2017-07-22 NOTE — CP.PCM.PN
Subjective





- Date & Time of Evaluation


Date of Evaluation: 07/22/17


Time of Evaluation: 07:35





- Subjective


Subjective: 





S/O no complaints/distress, no f/c n/v/d. pending placement


A/P cont current plan





Objective





- Vital Signs/Intake and Output


Vital Signs (last 24 hours): 


 











Temp Pulse Resp BP Pulse Ox


 


 98.5 F   89   20   102/76   98 


 


 07/22/17 00:17  07/22/17 00:17  07/22/17 00:17  07/22/17 00:17  07/22/17 00:17











- Medications


Medications: 


 Current Medications





Atorvastatin Calcium (Lipitor)  20 mg PO HS [facility]


   Last Admin: 07/21/17 21:09 Dose:  20 mg


Divalproex Sodium (Depakote Dr(*Bid*))  500 mg PO BID [facility]


   Last Admin: 07/21/17 18:01 Dose:  500 mg


Enalapril Maleate (Vasotec)  10 mg PO DAILY [facility]


   Last Admin: 07/21/17 11:11 Dose:  10 mg


Fenofibrate (Tricor)  145 mg PO DAILY [facility]


   Last Admin: 07/21/17 11:11 Dose:  145 mg


Metoprolol Tartrate (Lopressor)  50 mg PO Q12 [facility]


   Last Admin: 07/21/17 21:10 Dose:  50 mg


Quetiapine Fumarate (Seroquel)  25 mg PO HS [facility]


   Last Admin: 07/21/17 21:09 Dose:  25 mg











- Labs


Labs: 


 





 07/13/17 04:20 





 07/13/17 04:20 





 











PT  11.2 SECONDS (9.4-12.0)   12/14/15  05:20    


 


INR  1.05  (0.89-1.20)   12/14/15  05:20    


 


APTT  36.2 SECONDS (23.1-32.0)  H  12/14/15  05:20    














- Constitutional


Appears: Well, Non-toxic, No Acute Distress





- Head Exam


Head Exam: ATRAUMATIC, NORMAL INSPECTION, NORMOCEPHALIC





- Eye Exam


Eye Exam: EOMI, Normal appearance, PERRL


Pupil Exam: NORMAL ACCOMODATION, PERRL





- ENT Exam


ENT Exam: Mucous Membranes Moist, Normal Exam





- Neck Exam


Neck Exam: Full ROM, Normal Inspection.  absent: Lymphadenopathy





- Respiratory Exam


Respiratory Exam: Clear to Ausculation Bilateral, NORMAL BREATHING PATTERN





- Cardiovascular Exam


Cardiovascular Exam: REGULAR RHYTHM, RRR, +S1, +S2.  absent: Murmur





- GI/Abdominal Exam


GI & Abdominal Exam: Soft, Normal Bowel Sounds.  absent: Tenderness





- Extremities Exam


Extremities Exam: Full ROM, Normal Capillary Refill, Normal Inspection.  absent

: Joint Swelling, Pedal Edema





- Back Exam


Back Exam: NORMAL INSPECTION





- Neurological Exam


Neurological Exam: Alert, Awake, CN II-XII Intact, Normal Gait, Oriented x3





- Psychiatric Exam


Psychiatric exam: Normal Affect, Normal Mood





- Skin


Skin Exam: Dry, Intact, Normal Color, Warm





Assessment and Plan


(1) DVT prophylaxis


Status: Chronic   





(2) Scrotal edema


Status: Chronic   





(3) CAD (coronary artery disease)


Status: Chronic   





(4) Smoking


Status: Chronic   





(5) Low back pain


Status: Chronic   





(6) Prediabetes


Status: Chronic   





(7) Neurocognitive disorder


Status: Chronic

## 2017-07-23 RX ADMIN — DIVALPROEX SODIUM SCH MG: 500 TABLET, DELAYED RELEASE ORAL at 08:55

## 2017-07-23 RX ADMIN — DIVALPROEX SODIUM SCH MG: 500 TABLET, DELAYED RELEASE ORAL at 16:23

## 2017-07-23 NOTE — CP.PCM.PN
Subjective





- Date & Time of Evaluation


Date of Evaluation: 07/23/17


Time of Evaluation: 10:14





- Subjective


Subjective: 





S/O no complaints/distress, no f/c n/v/d. pending placement


A/P cont current plan





Objective





- Vital Signs/Intake and Output


Vital Signs (last 24 hours): 


 











Temp Pulse Resp BP Pulse Ox


 


 97.3 F L  71   20   111/74   97 


 


 07/23/17 07:39  07/23/17 07:39  07/23/17 07:39  07/23/17 07:39  07/23/17 07:39











- Medications


Medications: 


 Current Medications





Atorvastatin Calcium (Lipitor)  20 mg PO HS [facility]


   Last Admin: 07/22/17 21:32 Dose:  20 mg


Divalproex Sodium (Depakote Dr(*Bid*))  500 mg PO BID [facility]


   Last Admin: 07/23/17 08:55 Dose:  500 mg


Enalapril Maleate (Vasotec)  10 mg PO DAILY [facility]


   Last Admin: 07/23/17 08:56 Dose:  10 mg


Fenofibrate (Tricor)  145 mg PO DAILY [facility]


   Last Admin: 07/23/17 08:56 Dose:  145 mg


Metoprolol Tartrate (Lopressor)  50 mg PO Q12 [facility]


   Last Admin: 07/23/17 08:55 Dose:  50 mg


Quetiapine Fumarate (Seroquel)  25 mg PO HS [facility]


   Last Admin: 07/22/17 21:32 Dose:  25 mg











- Labs


Labs: 


 





 07/13/17 04:20 





 07/13/17 04:20 





 











PT  11.2 SECONDS (9.4-12.0)   12/14/15  05:20    


 


INR  1.05  (0.89-1.20)   12/14/15  05:20    


 


APTT  36.2 SECONDS (23.1-32.0)  H  12/14/15  05:20    














- Constitutional


Appears: Well, Non-toxic, No Acute Distress





- Head Exam


Head Exam: ATRAUMATIC, NORMAL INSPECTION, NORMOCEPHALIC





- Eye Exam


Eye Exam: EOMI, Normal appearance, PERRL


Pupil Exam: NORMAL ACCOMODATION, PERRL





- ENT Exam


ENT Exam: Mucous Membranes Moist, Normal Exam





- Neck Exam


Neck Exam: Full ROM, Normal Inspection.  absent: Lymphadenopathy





- Respiratory Exam


Respiratory Exam: Clear to Ausculation Bilateral, NORMAL BREATHING PATTERN





- Cardiovascular Exam


Cardiovascular Exam: REGULAR RHYTHM, RRR, +S1, +S2.  absent: Murmur





- GI/Abdominal Exam


GI & Abdominal Exam: Soft, Normal Bowel Sounds.  absent: Tenderness





- Extremities Exam


Extremities Exam: Full ROM, Normal Capillary Refill, Normal Inspection.  absent

: Joint Swelling, Pedal Edema





- Back Exam


Back Exam: NORMAL INSPECTION





- Neurological Exam


Neurological Exam: Alert, Awake, CN II-XII Intact, Normal Gait, Oriented x3





- Psychiatric Exam


Psychiatric exam: Normal Affect, Normal Mood





- Skin


Skin Exam: Dry, Intact, Normal Color, Warm





Assessment and Plan


(1) DVT prophylaxis


Status: Chronic   





(2) Scrotal edema


Status: Chronic   





(3) CAD (coronary artery disease)


Status: Chronic   





(4) Smoking


Status: Chronic   





(5) Low back pain


Status: Chronic   





(6) Prediabetes


Status: Chronic   





(7) Neurocognitive disorder


Status: Chronic

## 2017-07-24 RX ADMIN — DIVALPROEX SODIUM SCH MG: 500 TABLET, DELAYED RELEASE ORAL at 08:51

## 2017-07-24 RX ADMIN — DIVALPROEX SODIUM SCH MG: 500 TABLET, DELAYED RELEASE ORAL at 16:48

## 2017-07-24 NOTE — CP.PCM.PN
Subjective





- Date & Time of Evaluation


Date of Evaluation: 07/24/17


Time of Evaluation: 07:34





- Subjective


Subjective: 





S/O no complaints/distress, no f/c n/v/d. pending placement


A/P cont current plan





Objective





- Vital Signs/Intake and Output


Vital Signs (last 24 hours): 


 











Temp Pulse Resp BP Pulse Ox


 


 97.3 F L  63   18   122/79   97 


 


 07/24/17 07:29  07/24/17 07:29  07/24/17 07:29  07/24/17 07:29  07/24/17 07:29











- Medications


Medications: 


 Current Medications





Atorvastatin Calcium (Lipitor)  20 mg PO HS [facility]


   Last Admin: 07/23/17 21:21 Dose:  20 mg


Divalproex Sodium (Depakote Dr(*Bid*))  500 mg PO BID [facility]


   Last Admin: 07/23/17 16:23 Dose:  500 mg


Enalapril Maleate (Vasotec)  10 mg PO DAILY [facility]


   Last Admin: 07/23/17 08:56 Dose:  10 mg


Fenofibrate (Tricor)  145 mg PO DAILY [facility]


   Last Admin: 07/23/17 08:56 Dose:  145 mg


Metoprolol Tartrate (Lopressor)  50 mg PO Q12 [facility]


   Last Admin: 07/23/17 21:20 Dose:  50 mg


Quetiapine Fumarate (Seroquel)  25 mg PO HS [facility]


   Last Admin: 07/23/17 21:21 Dose:  25 mg











- Labs


Labs: 


 





 07/13/17 04:20 





 07/13/17 04:20 





 











PT  11.2 SECONDS (9.4-12.0)   12/14/15  05:20    


 


INR  1.05  (0.89-1.20)   12/14/15  05:20    


 


APTT  36.2 SECONDS (23.1-32.0)  H  12/14/15  05:20    














- Constitutional


Appears: Well, Non-toxic, No Acute Distress





- Head Exam


Head Exam: ATRAUMATIC, NORMAL INSPECTION, NORMOCEPHALIC





- Eye Exam


Eye Exam: EOMI, Normal appearance, PERRL


Pupil Exam: NORMAL ACCOMODATION, PERRL





- ENT Exam


ENT Exam: Mucous Membranes Moist, Normal Exam





- Neck Exam


Neck Exam: Full ROM, Normal Inspection.  absent: Lymphadenopathy





- Respiratory Exam


Respiratory Exam: Clear to Ausculation Bilateral, NORMAL BREATHING PATTERN





- Cardiovascular Exam


Cardiovascular Exam: REGULAR RHYTHM, RRR, +S1, +S2.  absent: Murmur





- GI/Abdominal Exam


GI & Abdominal Exam: Soft, Normal Bowel Sounds.  absent: Tenderness





- Extremities Exam


Extremities Exam: Full ROM, Normal Capillary Refill, Normal Inspection.  absent

: Joint Swelling, Pedal Edema





- Back Exam


Back Exam: NORMAL INSPECTION





- Neurological Exam


Neurological Exam: Alert, Awake, CN II-XII Intact, Normal Gait, Oriented x3





- Psychiatric Exam


Psychiatric exam: Normal Affect, Normal Mood





- Skin


Skin Exam: Dry, Intact, Normal Color, Warm





Assessment and Plan


(1) DVT prophylaxis


Status: Chronic   





(2) Scrotal edema


Status: Chronic   





(3) CAD (coronary artery disease)


Status: Chronic   





(4) Smoking


Status: Chronic   





(5) Low back pain


Status: Chronic   





(6) Prediabetes


Status: Chronic   





(7) Neurocognitive disorder


Status: Chronic

## 2017-07-25 RX ADMIN — DIVALPROEX SODIUM SCH MG: 500 TABLET, DELAYED RELEASE ORAL at 18:06

## 2017-07-25 RX ADMIN — DIVALPROEX SODIUM SCH MG: 500 TABLET, DELAYED RELEASE ORAL at 09:37

## 2017-07-25 NOTE — CP.PCM.PN
Subjective





- Date & Time of Evaluation


Date of Evaluation: 07/25/17


Time of Evaluation: 08:14





- Subjective


Subjective: 





S/O no complaints/distress, no f/c n/v/d. pending placement


A/P cont current plan





Objective





- Vital Signs/Intake and Output


Vital Signs (last 24 hours): 


 











Temp Pulse Resp BP Pulse Ox


 


 97 F L  62   20   111/72   97 


 


 07/25/17 08:03  07/25/17 08:03  07/25/17 08:03  07/25/17 08:03  07/25/17 08:03











- Medications


Medications: 


 Current Medications





Atorvastatin Calcium (Lipitor)  20 mg PO HS [facility]


   Last Admin: 07/24/17 21:31 Dose:  20 mg


Divalproex Sodium (Depakote Dr(*Bid*))  500 mg PO BID [facility]


   Last Admin: 07/24/17 16:48 Dose:  500 mg


Enalapril Maleate (Vasotec)  10 mg PO DAILY [facility]


   Last Admin: 07/24/17 08:53 Dose:  10 mg


Fenofibrate (Tricor)  145 mg PO DAILY [facility]


   Last Admin: 07/24/17 08:53 Dose:  145 mg


Metoprolol Tartrate (Lopressor)  50 mg PO Q12 [facility]


   Last Admin: 07/24/17 21:00 Dose:  50 mg


Quetiapine Fumarate (Seroquel)  25 mg PO HS [facility]


   Last Admin: 07/24/17 21:31 Dose:  25 mg











- Labs


Labs: 


 





 07/13/17 04:20 





 07/13/17 04:20 





 











PT  11.2 SECONDS (9.4-12.0)   12/14/15  05:20    


 


INR  1.05  (0.89-1.20)   12/14/15  05:20    


 


APTT  36.2 SECONDS (23.1-32.0)  H  12/14/15  05:20    














- Constitutional


Appears: Well, Non-toxic, No Acute Distress





- Head Exam


Head Exam: ATRAUMATIC, NORMAL INSPECTION, NORMOCEPHALIC





- Eye Exam


Eye Exam: EOMI, Normal appearance, PERRL


Pupil Exam: NORMAL ACCOMODATION, PERRL





- ENT Exam


ENT Exam: Mucous Membranes Moist, Normal Exam





- Neck Exam


Neck Exam: Full ROM, Normal Inspection.  absent: Lymphadenopathy





- Respiratory Exam


Respiratory Exam: Clear to Ausculation Bilateral, NORMAL BREATHING PATTERN





- Cardiovascular Exam


Cardiovascular Exam: REGULAR RHYTHM, RRR, +S1, +S2.  absent: Murmur





- GI/Abdominal Exam


GI & Abdominal Exam: Soft, Normal Bowel Sounds.  absent: Tenderness





- Extremities Exam


Extremities Exam: Full ROM, Normal Capillary Refill, Normal Inspection.  absent

: Joint Swelling, Pedal Edema





- Back Exam


Back Exam: NORMAL INSPECTION





- Neurological Exam


Neurological Exam: Alert, Awake, CN II-XII Intact, Normal Gait, Oriented x3





- Psychiatric Exam


Psychiatric exam: Normal Affect, Normal Mood





- Skin


Skin Exam: Dry, Intact, Normal Color, Warm





Assessment and Plan


(1) DVT prophylaxis


Status: Chronic   





(2) Scrotal edema


Status: Chronic   





(3) CAD (coronary artery disease)


Status: Chronic   





(4) Smoking


Status: Chronic   





(5) Low back pain


Status: Chronic   





(6) Prediabetes


Status: Chronic   





(7) Neurocognitive disorder


Status: Chronic

## 2017-07-26 RX ADMIN — DIVALPROEX SODIUM SCH MG: 500 TABLET, DELAYED RELEASE ORAL at 10:10

## 2017-07-26 RX ADMIN — DIVALPROEX SODIUM SCH MG: 500 TABLET, DELAYED RELEASE ORAL at 16:38

## 2017-07-26 NOTE — CP.PCM.PN
Subjective





- Date & Time of Evaluation


Date of Evaluation: 07/26/17


Time of Evaluation: 07:26





- Subjective


Subjective: 





S/O no complaints/distress, no f/c n/v/d. pending placement


A/P cont current plan





Objective





- Vital Signs/Intake and Output


Vital Signs (last 24 hours): 


 











Temp Pulse Resp BP Pulse Ox


 


 97.2 F L  79   20   120/88   98 


 


 07/26/17 00:08  07/26/17 00:08  07/26/17 00:08  07/26/17 00:08  07/26/17 00:08











- Medications


Medications: 


 Current Medications





Atorvastatin Calcium (Lipitor)  20 mg PO [facility]


   Last Admin: 07/25/17 21:04 Dose:  20 mg


Divalproex Sodium (Depakote Dr(*Bid*))  [facility] mg [facility] BID [facility]


   Last Admin: 07/25/17 18:06 Dose:  500 mg


Enalapril Maleate (Vasotec)  10 mg PO DAILY [facility]


   Last Admin: 07/25/17 09:38 Dose:  10 mg


Fenofibrate (Tricor)  145 mg PO DAILY [facility]


   Last Admin: 07/25/17 09:38 Dose:  145 mg


Metoprolol Tartrate (Lopressor)  50 mg PO Q12 [facility]


   Last Admin: 07/25/17 21:04 Dose:  50 mg


Quetiapine Fumarate (Seroquel)  25 mg PO HS [facility]


   Last Admin: 07/25/17 21:04 Dose:  25 mg











- Labs


Labs: 


 





 07/13/17 04:20 





 07/13/17 04:20 





 











PT  11.2 SECONDS (9.4-12.0)   12/14/15  05:20    


 


INR  1.05  (0.89-1.20)   12/14/15  05:20    


 


APTT  36.2 SECONDS (23.1-32.0)  H  12/14/15  05:20    














- Constitutional


Appears: Well, Non-toxic, No Acute Distress





- Head Exam


Head Exam: ATRAUMATIC, NORMAL INSPECTION, NORMOCEPHALIC





- Eye Exam


Eye Exam: EOMI, Normal appearance, PERRL


Pupil Exam: NORMAL ACCOMODATION, PERRL





- ENT Exam


ENT Exam: Mucous Membranes Moist, Normal Exam





- Neck Exam


Neck Exam: Full ROM, Normal Inspection.  absent: Lymphadenopathy





- Respiratory Exam


Respiratory Exam: Clear to Ausculation Bilateral, NORMAL BREATHING PATTERN





- Cardiovascular Exam


Cardiovascular Exam: REGULAR RHYTHM, RRR, +S1, +S2.  absent: Murmur





- GI/Abdominal Exam


GI & Abdominal Exam: Soft, Normal Bowel Sounds.  absent: Tenderness





- Extremities Exam


Extremities Exam: Full ROM, Normal Capillary Refill, Normal Inspection.  absent

: Joint Swelling, Pedal Edema





- Back Exam


Back Exam: NORMAL INSPECTION





- Neurological Exam


Neurological Exam: Alert, Awake, CN II-XII Intact, Normal Gait, Oriented x3





- Psychiatric Exam


Psychiatric exam: Normal Affect, Normal Mood





- Skin


Skin Exam: Dry, Intact, Normal Color, Warm





Assessment and Plan


(1) DVT prophylaxis


Status: Chronic   





(2) Scrotal edema


Status: Chronic   





(3) CAD (coronary artery disease)


Status: Chronic   





(4) Smoking


Status: Chronic   





(5) Low back pain


Status: Chronic   





(6) Prediabetes


Status: Chronic   





(7) Neurocognitive disorder


Status: Chronic

## 2017-07-27 RX ADMIN — DIVALPROEX SODIUM SCH MG: 500 TABLET, DELAYED RELEASE ORAL at 09:14

## 2017-07-27 RX ADMIN — DIVALPROEX SODIUM SCH MG: 500 TABLET, DELAYED RELEASE ORAL at 17:11

## 2017-07-27 NOTE — CP.PCM.PN
Subjective





- Date & Time of Evaluation


Date of Evaluation: 07/27/17


Time of Evaluation: 07:50





- Subjective


Subjective: 





S/O no complaints/distress, no f/c n/v/d. pending placement


A/P cont current plan





Objective





- Vital Signs/Intake and Output


Vital Signs (last 24 hours): 


 











Temp Pulse Resp BP Pulse Ox


 


 97.2 F L  88   20   101/64   98 


 


 07/27/17 00:11  07/27/17 00:11  07/27/17 00:11  07/27/17 00:11  07/27/17 00:11











- Medications


Medications: 


 Current Medications





Atorvastatin Calcium (Lipitor)  20 mg PO [facility]


   Last Admin: 07/26/17 21:27 Dose:  20 mg


Divalproex Sodium (Depakote Dr(*Bid*))  500 mg PO BID [facility]


   Last Admin: 07/26/17 16:38 Dose:  500 mg


Enalapril Maleate (Vasotec)  10 mg PO DAILY [facility]


   Last Admin: 07/26/17 10:11 Dose:  10 mg


Fenofibrate (Tricor)  145 mg PO DAILY [facility]


   Last Admin: 07/26/17 10:11 Dose:  145 mg


Metoprolol Tartrate (Lopressor)  50 mg PO Q12 [facility]


   Last Admin: 07/26/17 21:28 Dose:  50 mg


Quetiapine Fumarate (Seroquel)  25 mg PO HS [facility]


   Last Admin: 07/26/17 21:27 Dose:  25 mg











- Labs


Labs: 


 





 07/13/17 04:20 





 07/13/17 04:20 





 











PT  11.2 SECONDS (9.4-12.0)   12/14/15  05:20    


 


INR  1.05  (0.89-1.20)   12/14/15  05:20    


 


APTT  36.2 SECONDS (23.1-32.0)  H  12/14/15  05:20    














- Constitutional


Appears: Well, Non-toxic, No Acute Distress





- Head Exam


Head Exam: ATRAUMATIC, NORMAL INSPECTION, NORMOCEPHALIC





- Eye Exam


Eye Exam: EOMI, Normal appearance, PERRL


Pupil Exam: NORMAL ACCOMODATION, PERRL





- ENT Exam


ENT Exam: Mucous Membranes Moist, Normal Exam





- Neck Exam


Neck Exam: Full ROM, Normal Inspection.  absent: Lymphadenopathy





- Respiratory Exam


Respiratory Exam: Clear to Ausculation Bilateral, NORMAL BREATHING PATTERN





- Cardiovascular Exam


Cardiovascular Exam: REGULAR RHYTHM, RRR, +S1, +S2.  absent: Murmur





- GI/Abdominal Exam


GI & Abdominal Exam: Soft, Normal Bowel Sounds.  absent: Tenderness





- Extremities Exam


Extremities Exam: Full ROM, Normal Capillary Refill, Normal Inspection.  absent

: Joint Swelling, Pedal Edema





- Back Exam


Back Exam: NORMAL INSPECTION





- Neurological Exam


Neurological Exam: Alert, Awake, CN II-XII Intact, Normal Gait, Oriented x3





- Psychiatric Exam


Psychiatric exam: Normal Affect, Normal Mood





- Skin


Skin Exam: Dry, Intact, Normal Color, Warm





Assessment and Plan


(1) DVT prophylaxis


Status: Chronic   





(2) Scrotal edema


Status: Chronic   





(3) CAD (coronary artery disease)


Status: Chronic   





(4) Smoking


Status: Chronic   





(5) Low back pain


Status: Chronic   





(6) Prediabetes


Status: Chronic   





(7) Neurocognitive disorder


Status: Chronic

## 2017-07-28 RX ADMIN — DIVALPROEX SODIUM SCH MG: 500 TABLET, DELAYED RELEASE ORAL at 08:50

## 2017-07-28 NOTE — CP.PCM.PN
Subjective





- Date & Time of Evaluation


Date of Evaluation: 07/28/17


Time of Evaluation: 11:44





- Subjective


Subjective: 





S/O no complaints/distress, no f/c n/v/d. pending placement


A/P cont current plan





Objective





- Vital Signs/Intake and Output


Vital Signs (last 24 hours): 


 











Temp Pulse Resp BP Pulse Ox


 


 98 F   60   18   96/67 L  98 


 


 07/28/17 08:45  07/28/17 08:45  07/28/17 08:45  07/28/17 08:50  07/28/17 08:45











- Medications


Medications: 


 Current Medications





Atorvastatin Calcium (Lipitor)  20 mg PO HS [facility]


   Last Admin: 07/27/17 21:42 Dose:  20 mg


Divalproex Sodium (Depakote Dr(*Bid*))  500 mg PO BID [facility]


   Last Admin: 07/28/17 08:50 Dose:  500 mg


Enalapril Maleate (Vasotec)  10 mg PO DAILY [facility]


   Last Admin: 07/27/17 17:10 Dose:  10 mg


Fenofibrate (Tricor)  145 mg PO DAILY [facility]


   Last Admin: 07/28/17 08:50 Dose:  145 mg


Metoprolol Tartrate (Lopressor)  50 mg PO Q12 [facility]


   Last Admin: 07/28/17 08:50 Dose:  Not Given


Quetiapine Fumarate (Seroquel)  25 mg PO HS [facility]


   Last Admin: 07/27/17 21:44 Dose:  25 mg











- Labs


Labs: 


 





 07/13/17 04:20 





 07/13/17 04:20 





 











PT  11.2 SECONDS (9.4-12.0)   12/14/15  05:20    


 


INR  1.05  (0.89-1.20)   12/14/15  05:20    


 


APTT  36.2 SECONDS (23.1-32.0)  H  12/14/15  05:20    














- Constitutional


Appears: Well, Non-toxic, No Acute Distress





- Head Exam


Head Exam: ATRAUMATIC, NORMAL INSPECTION, NORMOCEPHALIC





- Eye Exam


Eye Exam: EOMI, Normal appearance, PERRL


Pupil Exam: NORMAL ACCOMODATION, PERRL





- ENT Exam


ENT Exam: Mucous Membranes Moist, Normal Exam





- Neck Exam


Neck Exam: Full ROM, Normal Inspection.  absent: Lymphadenopathy





- Respiratory Exam


Respiratory Exam: Clear to Ausculation Bilateral, NORMAL BREATHING PATTERN





- Cardiovascular Exam


Cardiovascular Exam: REGULAR RHYTHM, RRR, +S1, +S2.  absent: Murmur





- GI/Abdominal Exam


GI & Abdominal Exam: Soft, Normal Bowel Sounds.  absent: Tenderness





- Extremities Exam


Extremities Exam: Full ROM, Normal Capillary Refill, Normal Inspection.  absent

: Joint Swelling, Pedal Edema





- Back Exam


Back Exam: NORMAL INSPECTION





- Neurological Exam


Neurological Exam: Alert, Awake, CN II-XII Intact, Normal Gait, Oriented x3





- Psychiatric Exam


Psychiatric exam: Normal Affect, Normal Mood





- Skin


Skin Exam: Dry, Intact, Normal Color, Warm





Assessment and Plan


(1) DVT prophylaxis


Status: Chronic   





(2) Scrotal edema


Status: Chronic   





(3) CAD (coronary artery disease)


Status: Chronic   





(4) Smoking


Status: Chronic   





(5) Low back pain


Status: Chronic   





(6) Prediabetes


Status: Chronic   





(7) Neurocognitive disorder


Status: Chronic

## 2017-07-29 RX ADMIN — DIVALPROEX SODIUM SCH MG: 500 TABLET, DELAYED RELEASE ORAL at 17:53

## 2017-07-29 RX ADMIN — DIVALPROEX SODIUM SCH MG: 500 TABLET, DELAYED RELEASE ORAL at 17:00

## 2017-07-29 RX ADMIN — DIVALPROEX SODIUM SCH MG: 500 TABLET, DELAYED RELEASE ORAL at 10:03

## 2017-07-29 NOTE — CP.PCM.PN
Subjective





- Date & Time of Evaluation


Date of Evaluation: 07/29/17


Time of Evaluation: 11:01





- Subjective


Subjective: 





S/O no complaints/distress, no f/c n/v/d. pending placement


A/P cont current plan





Objective





- Vital Signs/Intake and Output


Vital Signs (last 24 hours): 


 











Temp Pulse Resp BP Pulse Ox


 


 97.8 F   61   18   102/69   95 


 


 07/29/17 08:06  07/29/17 10:01  07/29/17 08:06  07/29/17 10:01  07/29/17 08:06











- Medications


Medications: 


 Current Medications





Atorvastatin Calcium (Lipitor)  20 mg PO [facility]


   Last Admin: 07/28/17 22:11 Dose:  20 mg


Divalproex Sodium (Depakote Dr(*Bid*))  500 mg PO BID [facility]


   Last Admin: 07/29/17 10:03 Dose:  500 mg


Enalapril Maleate (Vasotec)  10 mg PO DAILY [facility]


   Last Admin: 07/29/17 10:01 Dose:  10 mg


Fenofibrate (Tricor)  145 mg PO DAILY [facility]


   Last Admin: 07/29/17 10:01 Dose:  145 mg


Metoprolol Tartrate (Lopressor)  50 mg PO Q12 [facility]


   Last Admin: 07/29/17 10:01 Dose:  50 mg


Quetiapine Fumarate (Seroquel)  25 mg PO HS [facility]


   Last Admin: 07/28/17 22:11 Dose:  25 mg











- Labs


Labs: 


 





 07/13/17 04:20 





 07/13/17 04:20 





 











PT  11.2 SECONDS (9.4-12.0)   12/14/15  05:20    


 


INR  1.05  (0.89-1.20)   12/14/15  05:20    


 


APTT  36.2 SECONDS (23.1-32.0)  H  12/14/15  05:20    














- Constitutional


Appears: Well, Non-toxic, No Acute Distress





- Head Exam


Head Exam: ATRAUMATIC, NORMAL INSPECTION, NORMOCEPHALIC





- Eye Exam


Eye Exam: EOMI, Normal appearance, PERRL


Pupil Exam: NORMAL ACCOMODATION, PERRL





- ENT Exam


ENT Exam: Mucous Membranes Moist, Normal Exam





- Neck Exam


Neck Exam: Full ROM, Normal Inspection.  absent: Lymphadenopathy





- Respiratory Exam


Respiratory Exam: Clear to Ausculation Bilateral, NORMAL BREATHING PATTERN





- Cardiovascular Exam


Cardiovascular Exam: REGULAR RHYTHM, RRR, +S1, +S2.  absent: Murmur





- GI/Abdominal Exam


GI & Abdominal Exam: Soft, Normal Bowel Sounds.  absent: Tenderness





- Extremities Exam


Extremities Exam: Full ROM, Normal Capillary Refill, Normal Inspection.  absent

: Joint Swelling, Pedal Edema





- Back Exam


Back Exam: NORMAL INSPECTION





- Neurological Exam


Neurological Exam: Alert, Awake, CN II-XII Intact, Normal Gait, Oriented x3





- Psychiatric Exam


Psychiatric exam: Normal Affect, Normal Mood





- Skin


Skin Exam: Dry, Intact, Normal Color, Warm





Assessment and Plan


(1) DVT prophylaxis


Status: Chronic   





(2) Scrotal edema


Status: Chronic   





(3) CAD (coronary artery disease)


Status: Chronic   





(4) Smoking


Status: Chronic   





(5) Low back pain


Status: Chronic   





(6) Prediabetes


Status: Chronic   





(7) Neurocognitive disorder


Status: Chronic

## 2017-07-30 RX ADMIN — DIVALPROEX SODIUM SCH MG: 500 TABLET, DELAYED RELEASE ORAL at 09:23

## 2017-07-30 RX ADMIN — DIVALPROEX SODIUM SCH MG: 500 TABLET, DELAYED RELEASE ORAL at 16:00

## 2017-07-30 NOTE — CP.PCM.PN
Subjective





- Date & Time of Evaluation


Date of Evaluation: 07/30/17


Time of Evaluation: 10:29





- Subjective


Subjective: 





S/O no complaints/distress, no f/c n/v/d. pending placement


A/P cont current plan





Objective





- Vital Signs/Intake and Output


Vital Signs (last 24 hours): 


 











Temp Pulse Resp BP Pulse Ox


 


 98 F   69   20   114/78   100 


 


 07/30/17 09:21  07/30/17 09:23  07/30/17 09:21  07/30/17 09:23  07/30/17 09:21











- Medications


Medications: 


 Current Medications





Atorvastatin Calcium (Lipitor)  20 mg PO HS [facility]


   Last Admin: 07/29/17 21:17 Dose:  20 mg


Divalproex Sodium (Depakote Dr(*Bid*))  500 mg PO BID [facility]


   Last Admin: 07/30/17 09:23 Dose:  500 mg


Enalapril Maleate (Vasotec)  10 mg PO DAILY [facility]


   Last Admin: 07/30/17 09:25 Dose:  10 mg


Fenofibrate (Tricor)  145 mg PO DAILY [facility]


   Last Admin: 07/30/17 09:24 Dose:  145 mg


Metoprolol Tartrate (Lopressor)  50 mg PO Q12 [facility]


   Last Admin: 07/30/17 09:23 Dose:  50 mg


Quetiapine Fumarate (Seroquel)  25 mg PO HS [facility]


   Last Admin: 07/29/17 21:17 Dose:  25 mg











- Labs


Labs: 


 





 07/13/17 04:20 





 07/13/17 04:20 





 











PT  11.2 SECONDS (9.4-12.0)   12/14/15  05:20    


 


INR  1.05  (0.89-1.20)   12/14/15  05:20    


 


APTT  36.2 SECONDS (23.1-32.0)  H  12/14/15  05:20    














- Constitutional


Appears: Well, Non-toxic, No Acute Distress





- Head Exam


Head Exam: ATRAUMATIC, NORMAL INSPECTION, NORMOCEPHALIC





- Eye Exam


Eye Exam: EOMI, Normal appearance, PERRL


Pupil Exam: NORMAL ACCOMODATION, PERRL





- ENT Exam


ENT Exam: Mucous Membranes Moist, Normal Exam





- Neck Exam


Neck Exam: Full ROM, Normal Inspection.  absent: Lymphadenopathy





- Respiratory Exam


Respiratory Exam: Clear to Ausculation Bilateral, NORMAL BREATHING PATTERN





- Cardiovascular Exam


Cardiovascular Exam: REGULAR RHYTHM, RRR, +S1, +S2.  absent: Murmur





- GI/Abdominal Exam


GI & Abdominal Exam: Soft, Normal Bowel Sounds.  absent: Tenderness





- Extremities Exam


Extremities Exam: Full ROM, Normal Capillary Refill, Normal Inspection.  absent

: Joint Swelling, Pedal Edema





- Back Exam


Back Exam: NORMAL INSPECTION





- Neurological Exam


Neurological Exam: Alert, Awake, CN II-XII Intact, Normal Gait, Oriented x3





- Psychiatric Exam


Psychiatric exam: Normal Affect, Normal Mood





- Skin


Skin Exam: Dry, Intact, Normal Color, Warm





Assessment and Plan


(1) DVT prophylaxis


Status: Chronic   





(2) Scrotal edema


Status: Chronic   





(3) CAD (coronary artery disease)


Status: Chronic   





(4) Smoking


Status: Chronic   





(5) Low back pain


Status: Chronic   





(6) Prediabetes


Status: Chronic   





(7) Neurocognitive disorder


Status: Chronic

## 2017-07-31 RX ADMIN — DIVALPROEX SODIUM SCH MG: 500 TABLET, DELAYED RELEASE ORAL at 17:06

## 2017-07-31 RX ADMIN — DIVALPROEX SODIUM SCH MG: 500 TABLET, DELAYED RELEASE ORAL at 08:39

## 2017-07-31 NOTE — CP.PCM.PN
Subjective





- Date & Time of Evaluation


Date of Evaluation: 07/31/17


Time of Evaluation: 07:16





- Subjective


Subjective: 





S/O no complaints/distress, no f/c n/v/d. pending placement


A/P cont current plan





Objective





- Vital Signs/Intake and Output


Vital Signs (last 24 hours): 


 











Temp Pulse Resp BP Pulse Ox


 


 97.8 F   79   20   110/65   99 


 


 07/31/17 00:46  07/31/17 00:46  07/31/17 00:46  07/31/17 00:46  07/31/17 00:46











- Medications


Medications: 


 Current Medications





Atorvastatin Calcium (Lipitor)  20 mg PO HS [facility]


   Last Admin: 07/30/17 21:35 Dose:  20 mg


Divalproex Sodium (Depakote Dr(*Bid*))  500 mg PO BID [facility]


   Last Admin: 07/30/17 16:00 Dose:  500 mg


Enalapril Maleate (Vasotec)  10 mg PO DAILY [facility]


   Last Admin: 07/30/17 09:25 Dose:  10 mg


Fenofibrate (Tricor)  145 mg PO DAILY [facility]


   Last Admin: 07/30/17 09:24 Dose:  145 mg


Metoprolol Tartrate (Lopressor)  50 mg PO Q12 [facility]


   Last Admin: 07/30/17 21:35 Dose:  50 mg


Quetiapine Fumarate (Seroquel)  25 mg PO HS [facility]


   Last Admin: 07/30/17 21:35 Dose:  25 mg











- Labs


Labs: 


 





 07/13/17 04:20 





 07/13/17 04:20 





 











PT  11.2 SECONDS (9.4-12.0)   12/14/15  05:20    


 


INR  1.05  (0.89-1.20)   12/14/15  05:20    


 


APTT  36.2 SECONDS (23.1-32.0)  H  12/14/15  05:20    














- Constitutional


Appears: Well, Non-toxic, No Acute Distress





- Head Exam


Head Exam: ATRAUMATIC, NORMAL INSPECTION, NORMOCEPHALIC





- Eye Exam


Eye Exam: EOMI, Normal appearance, PERRL


Pupil Exam: NORMAL ACCOMODATION, PERRL





- ENT Exam


ENT Exam: Mucous Membranes Moist, Normal Exam





- Neck Exam


Neck Exam: Full ROM, Normal Inspection.  absent: Lymphadenopathy





- Respiratory Exam


Respiratory Exam: Clear to Ausculation Bilateral, NORMAL BREATHING PATTERN





- Cardiovascular Exam


Cardiovascular Exam: REGULAR RHYTHM, RRR, +S1, +S2.  absent: Murmur





- GI/Abdominal Exam


GI & Abdominal Exam: Soft, Normal Bowel Sounds.  absent: Tenderness





- Extremities Exam


Extremities Exam: Full ROM, Normal Capillary Refill, Normal Inspection.  absent

: Joint Swelling, Pedal Edema





- Back Exam


Back Exam: NORMAL INSPECTION





- Neurological Exam


Neurological Exam: Alert, Awake, CN II-XII Intact, Normal Gait, Oriented x3





- Psychiatric Exam


Psychiatric exam: Normal Affect, Normal Mood





- Skin


Skin Exam: Dry, Intact, Normal Color, Warm





Assessment and Plan


(1) DVT prophylaxis


Status: Chronic   





(2) Scrotal edema


Status: Chronic   





(3) CAD (coronary artery disease)


Status: Chronic   





(4) Smoking


Status: Chronic   





(5) Low back pain


Status: Chronic   





(6) Prediabetes


Status: Chronic   





(7) Neurocognitive disorder


Status: Chronic

## 2017-08-01 RX ADMIN — DIVALPROEX SODIUM SCH MG: 500 TABLET, DELAYED RELEASE ORAL at 09:29

## 2017-08-01 RX ADMIN — DIVALPROEX SODIUM SCH MG: 500 TABLET, DELAYED RELEASE ORAL at 17:50

## 2017-08-02 RX ADMIN — DIVALPROEX SODIUM SCH MG: 500 TABLET, DELAYED RELEASE ORAL at 09:42

## 2017-08-02 RX ADMIN — DIVALPROEX SODIUM SCH MG: 500 TABLET, DELAYED RELEASE ORAL at 16:09

## 2017-08-02 NOTE — CP.PCM.PN
Subjective





- Date & Time of Evaluation


Date of Evaluation: 08/02/17


Time of Evaluation: 07:31





- Subjective


Subjective: 





S/O no complaints/distress, no f/c n/v/d. pending placement


A/P cont current plan





Objective





- Vital Signs/Intake and Output


Vital Signs (last 24 hours): 


 











Temp Pulse Resp BP Pulse Ox


 


 98.3 F   73   20   106/73   98 


 


 08/02/17 00:42  08/02/17 00:42  08/02/17 00:42  08/02/17 00:42  08/02/17 00:42











- Medications


Medications: 


 Current Medications





Atorvastatin Calcium (Lipitor)  20 mg PO [facility]


   Last Admin: 08/01/17 21:18 Dose:  20 mg


Divalproex Sodium (Depakote Dr(*Bid*))  [facility] mg [facility]ID [facility]


   Last Admin: 08/01/17 17:50 Dose:  500 mg


Enalapril Maleate (Vasotec)  10 mg PO DAILY [facility]


   Last Admin: 08/01/17 09:29 Dose:  10 mg


Fenofibrate (Tricor)  145 mg PO [facility]ILY [facility]


   Last Admin: 08/01/17 09:29 Dose:  145 mg


Metoprolol Tartrate (Lopressor)  50 mg PO Q12 [facility]


   Last Admin: 08/01/17 21:18 Dose:  50 mg


Quetiapine Fumarate (Seroquel)  25 mg PO HS [facility]


   Last Admin: 08/01/17 21:18 Dose:  25 mg











- Labs


Labs: 


 





 07/13/17 04:20 





 07/13/17 04:20 





 











PT  11.2 SECONDS (9.4-12.0)   12/14/15  05:20    


 


INR  1.05  (0.89-1.20)   12/14/15  05:20    


 


APTT  36.2 SECONDS (23.1-32.0)  H  12/14/15  05:20    














- Constitutional


Appears: Well, Non-toxic, No Acute Distress





- Head Exam


Head Exam: ATRAUMATIC, NORMAL INSPECTION, NORMOCEPHALIC





- Eye Exam


Eye Exam: EOMI, Normal appearance, PERRL


Pupil Exam: NORMAL ACCOMODATION, PERRL





- ENT Exam


ENT Exam: Mucous Membranes Moist, Normal Exam





- Neck Exam


Neck Exam: Full ROM, Normal Inspection.  absent: Lymphadenopathy





- Respiratory Exam


Respiratory Exam: Clear to Ausculation Bilateral, NORMAL BREATHING PATTERN





- Cardiovascular Exam


Cardiovascular Exam: REGULAR RHYTHM, RRR, +S1, +S2.  absent: Murmur





- GI/Abdominal Exam


GI & Abdominal Exam: Soft, Normal Bowel Sounds.  absent: Tenderness





- Extremities Exam


Extremities Exam: Full ROM, Normal Capillary Refill, Normal Inspection.  absent

: Joint Swelling, Pedal Edema





- Back Exam


Back Exam: NORMAL INSPECTION





- Neurological Exam


Neurological Exam: Alert, Awake, CN II-XII Intact, Normal Gait, Oriented x3





- Psychiatric Exam


Psychiatric exam: Normal Affect, Normal Mood





- Skin


Skin Exam: Dry, Intact, Normal Color, Warm





Assessment and Plan


(1) DVT prophylaxis


Status: Chronic   





(2) Scrotal edema


Status: Chronic   





(3) CAD (coronary artery disease)


Status: Chronic   





(4) Smoking


Status: Chronic   





(5) Low back pain


Status: Chronic   





(6) Prediabetes


Status: Chronic   





(7) Neurocognitive disorder


Status: Chronic

## 2017-08-03 RX ADMIN — DIVALPROEX SODIUM SCH MG: 500 TABLET, DELAYED RELEASE ORAL at 09:18

## 2017-08-03 RX ADMIN — DIVALPROEX SODIUM SCH MG: 500 TABLET, DELAYED RELEASE ORAL at 18:25

## 2017-08-03 NOTE — CP.PCM.PN
Subjective





- Date & Time of Evaluation


Date of Evaluation: 08/03/17


Time of Evaluation: 08:20





- Subjective


Subjective: 





S/O no complaints/distress, no f/c n/v/d. pending placement


A/P cont current plan





Objective





- Vital Signs/Intake and Output


Vital Signs (last 24 hours): 


 











Temp Pulse Resp BP Pulse Ox


 


 97.5 F L  63   20   101/68   95 


 


 08/03/17 07:25  08/03/17 07:25  08/03/17 07:25  08/03/17 07:25  08/03/17 07:25











- Medications


Medications: 


 Current Medications





Atorvastatin Calcium (Lipitor)  20 mg PO [facility]


   Last Admin: 08/02/17 21:53 Dose:  20 mg


Divalproex Sodium (Depakote Dr(*Bid*))  500 mg PO BID [facility]


   Last Admin: 08/02/17 16:09 Dose:  500 mg


Enalapril Maleate (Vasotec)  10 mg PO DAILY [facility]


   Last Admin: 08/02/17 09:42 Dose:  10 mg


Fenofibrate (Tricor)  145 mg PO DAILY [facility]


   Last Admin: 08/02/17 09:42 Dose:  145 mg


Metoprolol Tartrate (Lopressor)  50 mg PO Q12 [facility]


   Last Admin: 08/02/17 21:53 Dose:  50 mg


Quetiapine Fumarate (Seroquel)  25 mg PO HS [facility]


   Last Admin: 08/02/17 21:53 Dose:  25 mg











- Labs


Labs: 


 





 07/13/17 04:20 





 07/13/17 04:20 





 











PT  11.2 SECONDS (9.4-12.0)   12/14/15  05:20    


 


INR  1.05  (0.89-1.20)   12/14/15  05:20    


 


APTT  36.2 SECONDS (23.1-32.0)  H  12/14/15  05:20    














- Constitutional


Appears: Well, Non-toxic, No Acute Distress





- Head Exam


Head Exam: ATRAUMATIC, NORMAL INSPECTION, NORMOCEPHALIC





- Eye Exam


Eye Exam: EOMI, Normal appearance, PERRL


Pupil Exam: NORMAL ACCOMODATION, PERRL





- ENT Exam


ENT Exam: Mucous Membranes Moist, Normal Exam





- Neck Exam


Neck Exam: Full ROM, Normal Inspection.  absent: Lymphadenopathy





- Respiratory Exam


Respiratory Exam: Clear to Ausculation Bilateral, NORMAL BREATHING PATTERN





- Cardiovascular Exam


Cardiovascular Exam: REGULAR RHYTHM, +S1, +S2.  absent: Murmur





- GI/Abdominal Exam


GI & Abdominal Exam: Soft, Normal Bowel Sounds.  absent: Tenderness





- Extremities Exam


Extremities Exam: Full ROM, Normal Capillary Refill, Normal Inspection.  absent

: Joint Swelling, Pedal Edema





- Back Exam


Back Exam: NORMAL INSPECTION





- Neurological Exam


Neurological Exam: Alert, Awake, CN II-XII Intact, Normal Gait, Oriented x3





- Psychiatric Exam


Psychiatric exam: Normal Affect, Normal Mood





- Skin


Skin Exam: Dry, Intact, Normal Color, Warm





Assessment and Plan


(1) DVT prophylaxis


Status: Chronic   





(2) Scrotal edema


Status: Chronic   





(3) CAD (coronary artery disease)


Status: Chronic   





(4) Smoking


Status: Chronic   





(5) Low back pain


Status: Chronic   





(6) Prediabetes


Status: Chronic   





(7) Neurocognitive disorder


Status: Chronic

## 2017-08-04 RX ADMIN — DIVALPROEX SODIUM SCH MG: 500 TABLET, DELAYED RELEASE ORAL at 08:51

## 2017-08-04 RX ADMIN — DIVALPROEX SODIUM SCH MG: 500 TABLET, DELAYED RELEASE ORAL at 16:38

## 2017-08-04 NOTE — CP.PCM.PN
Subjective





- Date & Time of Evaluation


Date of Evaluation: 08/04/17


Time of Evaluation: 08:29





- Subjective


Subjective: 





S/O no complaints/distress, no f/c n/v/d. pending placement


A/P cont current plan





Objective





- Vital Signs/Intake and Output


Vital Signs (last 24 hours): 


 











Temp Pulse Resp BP Pulse Ox


 


 97.1 F L  66   20   99/66 L  96 


 


 08/04/17 08:19  08/04/17 08:19  08/04/17 08:19  08/04/17 08:19  08/04/17 08:19











- Medications


Medications: 


 Current Medications





Atorvastatin Calcium (Lipitor)  20 mg PO [facility]


   Last Admin: 08/03/17 21:50 Dose:  20 mg


Divalproex Sodium (Depakote Dr(*Bid*))  500 mg PO BID [facility]


   Last Admin: 08/03/17 18:25 Dose:  500 mg


Enalapril Maleate (Vasotec)  10 mg PO DAILY [facility]


   Last Admin: 08/03/17 09:18 Dose:  10 mg


Fenofibrate (Tricor)  145 mg PO DAILY [facility]


   Last Admin: 08/03/17 09:18 Dose:  145 mg


Metoprolol Tartrate (Lopressor)  50 mg PO Q12 [facility]


   Last Admin: 08/03/17 21:52 Dose:  50 mg


Quetiapine Fumarate (Seroquel)  25 mg PO [facility]


   Last Admin: 08/03/17 21:50 Dose:  25 mg











- Labs


Labs: 


 





 07/13/17 04:20 





 07/13/17 04:20 





 











PT  11.2 SECONDS (9.4-12.0)   12/14/15  05:20    


 


INR  1.05  (0.89-1.20)   12/14/15  05:20    


 


APTT  36.2 SECONDS (23.1-32.0)  H  12/14/15  05:20    














- Constitutional


Appears: Well, Non-toxic, No Acute Distress





- Head Exam


Head Exam: ATRAUMATIC, NORMAL INSPECTION, NORMOCEPHALIC





- Eye Exam


Eye Exam: EOMI, Normal appearance, PERRL


Pupil Exam: NORMAL ACCOMODATION, PERRL





- ENT Exam


ENT Exam: Mucous Membranes Moist, Normal Exam





- Neck Exam


Neck Exam: Full ROM, Normal Inspection.  absent: Lymphadenopathy





- Respiratory Exam


Respiratory Exam: Clear to Ausculation Bilateral, NORMAL BREATHING PATTERN





- Cardiovascular Exam


Cardiovascular Exam: REGULAR RHYTHM, +S1, +S2.  absent: Murmur





- GI/Abdominal Exam


GI & Abdominal Exam: Soft, Normal Bowel Sounds.  absent: Tenderness





- Extremities Exam


Extremities Exam: Full ROM, Normal Capillary Refill, Normal Inspection.  absent

: Joint Swelling, Pedal Edema





- Back Exam


Back Exam: NORMAL INSPECTION





- Neurological Exam


Neurological Exam: Alert, Awake, CN II-XII Intact, Normal Gait, Oriented x3





- Psychiatric Exam


Psychiatric exam: Normal Affect, Normal Mood





- Skin


Skin Exam: Dry, Intact, Normal Color, Warm





Assessment and Plan


(1) DVT prophylaxis


Status: Chronic   





(2) Scrotal edema


Status: Chronic   





(3) CAD (coronary artery disease)


Status: Chronic   





(4) Smoking


Status: Chronic   





(5) Low back pain


Status: Chronic   





(6) Prediabetes


Status: Chronic   





(7) Neurocognitive disorder


Status: Chronic

## 2017-08-05 RX ADMIN — DIVALPROEX SODIUM SCH MG: 500 TABLET, DELAYED RELEASE ORAL at 08:09

## 2017-08-05 RX ADMIN — DIVALPROEX SODIUM SCH MG: 500 TABLET, DELAYED RELEASE ORAL at 16:19

## 2017-08-05 NOTE — CP.PCM.PN
Subjective





- Date & Time of Evaluation


Date of Evaluation: 08/05/17


Time of Evaluation: 08:06





- Subjective


Subjective: 





S/O no complaints/distress, no f/c n/v/d. pending placement


A/P cont current plan





Objective





- Vital Signs/Intake and Output


Vital Signs (last 24 hours): 


 











Temp Pulse Resp BP Pulse Ox


 


 97.4 F L  60   20   90/60 L  98 


 


 08/05/17 07:30  08/05/17 07:30  08/05/17 07:30  08/05/17 07:30  08/05/17 07:30











- Medications


Medications: 


 Current Medications





Atorvastatin Calcium (Lipitor)  20 mg PO [facility]


   Last Admin: 08/04/17 21:23 Dose:  20 mg


Divalproex Sodium (Depakote Dr(*Bid*))  500 mg PO BID [facility]


   Last Admin: 08/04/17 16:38 Dose:  500 mg


Enalapril Maleate (Vasotec)  10 mg PO DAILY [facility]


   Last Admin: 08/04/17 08:51 Dose:  10 mg


Fenofibrate (Tricor)  145 mg PO DAILY [facility]


   Last Admin: 08/04/17 08:49 Dose:  145 mg


Metoprolol Tartrate (Lopressor)  50 mg PO Q12 [facility]


   Last Admin: 08/04/17 21:23 Dose:  50 mg


Quetiapine Fumarate (Seroquel)  25 mg PO [facility]


   Last Admin: 08/04/17 21:23 Dose:  25 mg











- Labs


Labs: 


 





 07/13/17 04:20 





 07/13/17 04:20 





 











PT  11.2 SECONDS (9.4-12.0)   12/14/15  05:20    


 


INR  1.05  (0.89-1.20)   12/14/15  05:20    


 


APTT  36.2 SECONDS (23.1-32.0)  H  12/14/15  05:20    














- Constitutional


Appears: Well, Non-toxic, No Acute Distress





- Head Exam


Head Exam: ATRAUMATIC, NORMAL INSPECTION, NORMOCEPHALIC





- Eye Exam


Eye Exam: EOMI, Normal appearance, PERRL


Pupil Exam: NORMAL ACCOMODATION, PERRL





- ENT Exam


ENT Exam: Mucous Membranes Moist, Normal Exam





- Neck Exam


Neck Exam: Full ROM, Normal Inspection.  absent: Lymphadenopathy





- Respiratory Exam


Respiratory Exam: Clear to Ausculation Bilateral, NORMAL BREATHING PATTERN





- Cardiovascular Exam


Cardiovascular Exam: REGULAR RHYTHM, RRR, +S1, +S2.  absent: Murmur





- GI/Abdominal Exam


GI & Abdominal Exam: Soft, Normal Bowel Sounds.  absent: Tenderness





- Extremities Exam


Extremities Exam: Full ROM, Normal Capillary Refill, Normal Inspection.  absent

: Joint Swelling, Pedal Edema





- Back Exam


Back Exam: NORMAL INSPECTION





- Neurological Exam


Neurological Exam: Alert, Awake, CN II-XII Intact, Normal Gait, Oriented x3





- Psychiatric Exam


Psychiatric exam: Normal Affect, Normal Mood





- Skin


Skin Exam: Dry, Intact, Normal Color, Warm





Assessment and Plan


(1) DVT prophylaxis


Status: Chronic   





(2) Scrotal edema


Status: Chronic   





(3) CAD (coronary artery disease)


Status: Chronic   





(4) Smoking


Status: Chronic   





(5) Low back pain


Status: Chronic   





(6) Prediabetes


Status: Chronic   





(7) Neurocognitive disorder


Status: Chronic

## 2017-08-06 RX ADMIN — DIVALPROEX SODIUM SCH MG: 500 TABLET, DELAYED RELEASE ORAL at 08:26

## 2017-08-06 RX ADMIN — DIVALPROEX SODIUM SCH MG: 500 TABLET, DELAYED RELEASE ORAL at 16:57

## 2017-08-06 NOTE — CP.PCM.PN
Subjective





- Date & Time of Evaluation


Date of Evaluation: 08/06/17


Time of Evaluation: 09:20





- Subjective


Subjective: 





S/O no complaints/distress, no f/c n/v/d. pending placement


A/P cont current plan





Objective





- Vital Signs/Intake and Output


Vital Signs (last 24 hours): 


 











Temp Pulse Resp BP Pulse Ox


 


 97.5 F L  61   20   136/88   98 


 


 08/06/17 07:33  08/06/17 08:26  08/06/17 07:33  08/06/17 08:26  08/06/17 07:33











- Medications


Medications: 


 Current Medications





Atorvastatin Calcium (Lipitor)  20 mg PO [facility]


   Last Admin: 08/05/17 21:37 Dose:  20 mg


Divalproex Sodium (Depakote Dr(*Bid*))  500 mg PO BID [facility]


   Last Admin: 08/06/17 08:26 Dose:  500 mg


Enalapril Maleate (Vasotec)  10 mg PO DAILY [facility]


   Last Admin: 08/06/17 08:26 Dose:  10 mg


Fenofibrate (Tricor)  145 mg PO DAILY [facility]


   Last Admin: 08/06/17 08:26 Dose:  145 mg


Metoprolol Tartrate (Lopressor)  50 mg PO Q12 [facility]


   Last Admin: 08/06/17 08:26 Dose:  50 mg


Quetiapine Fumarate (Seroquel)  25 mg PO HS [facility]


   Last Admin: 08/05/17 21:37 Dose:  25 mg











- Labs


Labs: 


 





 07/13/17 04:20 





 07/13/17 04:20 





 











PT  11.2 SECONDS (9.4-12.0)   12/14/15  05:20    


 


INR  1.05  (0.89-1.20)   12/14/15  05:20    


 


APTT  36.2 SECONDS (23.1-32.0)  H  12/14/15  05:20    














- Constitutional


Appears: Well, Non-toxic, No Acute Distress





- Head Exam


Head Exam: ATRAUMATIC, NORMAL INSPECTION, NORMOCEPHALIC





- Eye Exam


Eye Exam: EOMI, Normal appearance, PERRL


Pupil Exam: NORMAL ACCOMODATION, PERRL





- ENT Exam


ENT Exam: Mucous Membranes Moist, Normal Exam





- Neck Exam


Neck Exam: Full ROM, Normal Inspection.  absent: Lymphadenopathy





- Respiratory Exam


Respiratory Exam: Clear to Ausculation Bilateral, NORMAL BREATHING PATTERN





- Cardiovascular Exam


Cardiovascular Exam: REGULAR RHYTHM, RRR, +S1, +S2.  absent: Murmur





- GI/Abdominal Exam


GI & Abdominal Exam: Soft, Normal Bowel Sounds.  absent: Tenderness





- Extremities Exam


Extremities Exam: Full ROM, Normal Capillary Refill, Normal Inspection.  absent

: Joint Swelling, Pedal Edema





- Back Exam


Back Exam: NORMAL INSPECTION





- Neurological Exam


Neurological Exam: Alert, Awake, CN II-XII Intact, Normal Gait, Oriented x3





- Psychiatric Exam


Psychiatric exam: Normal Affect, Normal Mood





- Skin


Skin Exam: Dry, Intact, Normal Color, Warm





Assessment and Plan


(1) DVT prophylaxis


Status: Chronic   





(2) Scrotal edema


Status: Chronic   





(3) CAD (coronary artery disease)


Status: Chronic   





(4) Smoking


Status: Chronic   





(5) Low back pain


Status: Chronic   





(6) Prediabetes


Status: Chronic   





(7) Neurocognitive disorder


Status: Chronic

## 2017-08-07 RX ADMIN — DIVALPROEX SODIUM SCH MG: 500 TABLET, DELAYED RELEASE ORAL at 16:04

## 2017-08-07 RX ADMIN — DIVALPROEX SODIUM SCH MG: 500 TABLET, DELAYED RELEASE ORAL at 08:25

## 2017-08-07 NOTE — CP.PCM.PN
Subjective





- Date & Time of Evaluation


Date of Evaluation: 08/07/17


Time of Evaluation: 09:14





- Subjective


Subjective: 





S/O no complaints/distress, no f/c n/v/d. pending placement


A/P cont current plan





Objective





- Vital Signs/Intake and Output


Vital Signs (last 24 hours): 


 











Temp Pulse Resp BP Pulse Ox


 


 97.6 F   60   20   115/77   99 


 


 08/07/17 07:20  08/07/17 08:25  08/07/17 07:20  08/07/17 08:25  08/07/17 07:20











- Medications


Medications: 


 Current Medications





Atorvastatin Calcium (Lipitor)  20 mg PO HS [facility]


   Last Admin: 08/06/17 21:39 Dose:  20 mg


Divalproex Sodium (Depakote Dr(*Bid*))  500 mg PO BID [facility]


   Last Admin: 08/07/17 08:25 Dose:  500 mg


Enalapril Maleate (Vasotec)  10 mg PO DAILY [facility]


   Last Admin: 08/07/17 08:25 Dose:  10 mg


Fenofibrate (Tricor)  145 mg PO DAILY [facility]


   Last Admin: 08/07/17 08:25 Dose:  145 mg


Metoprolol Tartrate (Lopressor)  50 mg PO Q12 [facility]


   Last Admin: 08/07/17 08:25 Dose:  50 mg


Quetiapine Fumarate (Seroquel)  25 mg PO HS [facility]


   Last Admin: 08/06/17 21:39 Dose:  25 mg











- Labs


Labs: 


 





 07/13/17 04:20 





 07/13/17 04:20 





 











PT  11.2 SECONDS (9.4-12.0)   12/14/15  05:20    


 


INR  1.05  (0.89-1.20)   12/14/15  05:20    


 


APTT  36.2 SECONDS (23.1-32.0)  H  12/14/15  05:20    














- Constitutional


Appears: Well, Non-toxic, No Acute Distress





- Head Exam


Head Exam: ATRAUMATIC, NORMAL INSPECTION, NORMOCEPHALIC





- Eye Exam


Eye Exam: EOMI, Normal appearance, PERRL


Pupil Exam: NORMAL ACCOMODATION, PERRL





- ENT Exam


ENT Exam: Mucous Membranes Moist, Normal Exam





- Neck Exam


Neck Exam: Full ROM, Normal Inspection.  absent: Lymphadenopathy





- Respiratory Exam


Respiratory Exam: Clear to Ausculation Bilateral, NORMAL BREATHING PATTERN





- Cardiovascular Exam


Cardiovascular Exam: REGULAR RHYTHM, RRR, +S1, +S2.  absent: Murmur





- GI/Abdominal Exam


GI & Abdominal Exam: Soft, Normal Bowel Sounds.  absent: Tenderness





- Extremities Exam


Extremities Exam: Full ROM, Normal Capillary Refill, Normal Inspection.  absent

: Joint Swelling, Pedal Edema





- Back Exam


Back Exam: NORMAL INSPECTION





- Neurological Exam


Neurological Exam: Alert, Awake, CN II-XII Intact, Normal Gait, Oriented x3





- Psychiatric Exam


Psychiatric exam: Normal Affect, Normal Mood





- Skin


Skin Exam: Dry, Intact, Normal Color, Warm





Assessment and Plan


(1) DVT prophylaxis


Status: Chronic   





(2) Scrotal edema


Status: Chronic   





(3) CAD (coronary artery disease)


Status: Chronic   





(4) Smoking


Status: Chronic   





(5) Low back pain


Status: Chronic   





(6) Prediabetes


Status: Chronic   





(7) Neurocognitive disorder


Status: Chronic

## 2017-08-08 RX ADMIN — DIVALPROEX SODIUM SCH MG: 500 TABLET, DELAYED RELEASE ORAL at 09:24

## 2017-08-08 RX ADMIN — DIVALPROEX SODIUM SCH MG: 500 TABLET, DELAYED RELEASE ORAL at 16:03

## 2017-08-08 NOTE — CP.PCM.PN
Subjective





- Date & Time of Evaluation


Date of Evaluation: 08/08/17


Time of Evaluation: 07:42





- Subjective


Subjective: 





S/O no complaints/distress, no f/c n/v/d. pending placement


A/P cont current plan





Objective





- Vital Signs/Intake and Output


Vital Signs (last 24 hours): 


 











Temp Pulse Resp BP Pulse Ox


 


 97.4 F L  69   20   104/72   98 


 


 08/08/17 00:35  08/08/17 00:35  08/08/17 00:35  08/08/17 00:35  08/08/17 00:35











- Medications


Medications: 


 Current Medications





Atorvastatin Calcium (Lipitor)  20 mg PO [facility]


   Last Admin: 08/07/17 21:35 Dose:  20 mg


Divalproex Sodium (Depakote Dr(*Bid*))  500 mg PO BID [facility]


   Last Admin: 08/07/17 16:04 Dose:  500 mg


Enalapril Maleate (Vasotec)  10 mg PO DAILY [facility]


   Last Admin: 08/07/17 08:25 Dose:  10 mg


Fenofibrate (Tricor)  145 mg PO DAILY [facility]


   Last Admin: 08/07/17 08:25 Dose:  145 mg


Metoprolol Tartrate (Lopressor)  50 mg PO Q12 [facility]


   Last Admin: 08/07/17 21:35 Dose:  50 mg


Quetiapine Fumarate (Seroquel)  25 mg PO HS [facility]


   Last Admin: 08/07/17 21:35 Dose:  25 mg











- Labs


Labs: 


 





 07/13/17 04:20 





 07/13/17 04:20 





 











PT  11.2 SECONDS (9.4-12.0)   12/14/15  05:20    


 


INR  1.05  (0.89-1.20)   12/14/15  05:20    


 


APTT  36.2 SECONDS (23.1-32.0)  H  12/14/15  05:20    














- Constitutional


Appears: Well, Non-toxic, No Acute Distress





- Head Exam


Head Exam: ATRAUMATIC, NORMAL INSPECTION, NORMOCEPHALIC





- Eye Exam


Eye Exam: EOMI, Normal appearance, PERRL


Pupil Exam: NORMAL ACCOMODATION, PERRL





- ENT Exam


ENT Exam: Mucous Membranes Moist, Normal Exam





- Neck Exam


Neck Exam: Full ROM, Normal Inspection.  absent: Lymphadenopathy





- Respiratory Exam


Respiratory Exam: Clear to Ausculation Bilateral, NORMAL BREATHING PATTERN





- Cardiovascular Exam


Cardiovascular Exam: REGULAR RHYTHM, RRR, +S1, +S2.  absent: Murmur





- GI/Abdominal Exam


GI & Abdominal Exam: Soft, Normal Bowel Sounds.  absent: Tenderness





- Rectal Exam


Rectal Exam: NORMAL INSPECTION





- Extremities Exam


Extremities Exam: Full ROM, Normal Capillary Refill, Normal Inspection.  absent

: Joint Swelling, Pedal Edema





- Back Exam


Back Exam: NORMAL INSPECTION





- Neurological Exam


Neurological Exam: Alert, Awake, CN II-XII Intact, Normal Gait, Oriented x3





- Psychiatric Exam


Psychiatric exam: Normal Affect, Normal Mood





- Skin


Skin Exam: Dry, Intact, Normal Color, Warm





Assessment and Plan


(1) DVT prophylaxis


Status: Chronic   





(2) Scrotal edema


Status: Chronic   





(3) CAD (coronary artery disease)


Status: Chronic   





(4) Smoking


Status: Chronic   





(5) Low back pain


Status: Chronic   





(6) Prediabetes


Status: Chronic   





(7) Neurocognitive disorder


Status: Chronic

## 2017-08-09 RX ADMIN — DIVALPROEX SODIUM SCH MG: 500 TABLET, DELAYED RELEASE ORAL at 09:17

## 2017-08-09 RX ADMIN — DIVALPROEX SODIUM SCH MG: 500 TABLET, DELAYED RELEASE ORAL at 17:35

## 2017-08-09 NOTE — CP.PCM.PN
Subjective





- Date & Time of Evaluation


Date of Evaluation: 08/09/17


Time of Evaluation: 08:07





- Subjective


Subjective: 





S/O no complaints/distress, no f/c n/v/d. pending placement


A/P cont current plan





Objective





- Vital Signs/Intake and Output


Vital Signs (last 24 hours): 


 











Temp Pulse Resp BP Pulse Ox


 


 97.2 F L  60   18   98/64 L  98 


 


 08/09/17 07:24  08/09/17 07:24  08/09/17 07:24  08/09/17 07:24  08/09/17 07:24











- Medications


Medications: 


 Current Medications





Atorvastatin Calcium (Lipitor)  20 mg PO [facility]


   Last Admin: 08/08/17 21:21 Dose:  20 mg


Divalproex Sodium (Depakote Dr(*Bid*))  500 mg PO BID [facility]


   Last Admin: 08/08/17 16:03 Dose:  500 mg


Enalapril Maleate (Vasotec)  10 mg PO DAILY [facility]


   Last Admin: 08/08/17 09:25 Dose:  10 mg


Fenofibrate (Tricor)  145 mg PO DAILY [facility]


   Last Admin: 08/08/17 09:26 Dose:  145 mg


Metoprolol Tartrate (Lopressor)  50 mg PO Q12 [facility]


   Last Admin: 08/08/17 21:18 Dose:  50 mg


Quetiapine Fumarate (Seroquel)  25 mg PO [facility]


   Last Admin: 08/08/17 21:21 Dose:  25 mg











- Labs


Labs: 


 





 07/13/17 04:20 





 07/13/17 04:20 





 











PT  11.2 SECONDS (9.4-12.0)   12/14/15  05:20    


 


INR  1.05  (0.89-1.20)   12/14/15  05:20    


 


APTT  36.2 SECONDS (23.1-32.0)  H  12/14/15  05:20    














- Constitutional


Appears: Well, Non-toxic, No Acute Distress





- Head Exam


Head Exam: ATRAUMATIC, NORMAL INSPECTION, NORMOCEPHALIC





- Eye Exam


Eye Exam: EOMI, Normal appearance, PERRL


Pupil Exam: NORMAL ACCOMODATION, PERRL





- ENT Exam


ENT Exam: Mucous Membranes Moist, Normal Exam





- Neck Exam


Neck Exam: Full ROM, Normal Inspection.  absent: Lymphadenopathy





- Respiratory Exam


Respiratory Exam: Clear to Ausculation Bilateral, NORMAL BREATHING PATTERN





- Cardiovascular Exam


Cardiovascular Exam: REGULAR RHYTHM, RRR, +S1, +S2.  absent: Murmur





- GI/Abdominal Exam


GI & Abdominal Exam: Soft, Normal Bowel Sounds.  absent: Tenderness





- Extremities Exam


Extremities Exam: Full ROM, Normal Capillary Refill, Normal Inspection.  absent

: Joint Swelling, Pedal Edema





- Back Exam


Back Exam: NORMAL INSPECTION





- Neurological Exam


Neurological Exam: Alert, Awake, CN II-XII Intact, Normal Gait, Oriented x3





- Psychiatric Exam


Psychiatric exam: Normal Affect, Normal Mood





- Skin


Skin Exam: Dry, Intact, Normal Color, Warm





Assessment and Plan


(1) DVT prophylaxis


Status: Chronic   





(2) Scrotal edema


Status: Chronic   





(3) CAD (coronary artery disease)


Status: Chronic   





(4) Smoking


Status: Chronic   





(5) Low back pain


Status: Chronic   





(6) Prediabetes


Status: Chronic   





(7) Neurocognitive disorder


Status: Chronic

## 2017-08-10 RX ADMIN — DIVALPROEX SODIUM SCH MG: 500 TABLET, DELAYED RELEASE ORAL at 08:40

## 2017-08-10 RX ADMIN — DIVALPROEX SODIUM SCH MG: 500 TABLET, DELAYED RELEASE ORAL at 17:35

## 2017-08-10 NOTE — CP.PCM.PN
Subjective





- Date & Time of Evaluation


Date of Evaluation: 08/10/17


Time of Evaluation: 09:38





- Subjective


Subjective: 





S/O no complaints/distress, no f/c n/v/d. pending placement


A/P cont current plan





Objective





- Vital Signs/Intake and Output


Vital Signs (last 24 hours): 


 











Temp Pulse Resp BP Pulse Ox


 


 98 F   67   20   117/76   98 


 


 08/10/17 08:14  08/10/17 08:39  08/10/17 08:14  08/10/17 08:39  08/10/17 08:14











- Medications


Medications: 


 Current Medications





Atorvastatin Calcium (Lipitor)  20 mg PO [facility]


   Last Admin: 08/09/17 21:15 Dose:  20 mg


Divalproex Sodium (Depakote Dr(*Bid*))  500 mg PO BID [facility]


   Last Admin: 08/10/17 08:40 Dose:  500 mg


Enalapril Maleate (Vasotec)  10 mg PO DAILY [facility]


   Last Admin: 08/10/17 08:41 Dose:  10 mg


Fenofibrate (Tricor)  145 mg PO DAILY [facility]


   Last Admin: 08/10/17 08:40 Dose:  145 mg


Metoprolol Tartrate (Lopressor)  50 mg PO Q12 [facility]


   Last Admin: 08/10/17 08:39 Dose:  50 mg


Quetiapine Fumarate (Seroquel)  25 mg PO HS [facility]


   Last Admin: 08/09/17 21:15 Dose:  25 mg











- Labs


Labs: 


 





 07/13/17 04:20 





 07/13/17 04:20 





 











PT  11.2 SECONDS (9.4-12.0)   12/14/15  05:20    


 


INR  1.05  (0.89-1.20)   12/14/15  05:20    


 


APTT  36.2 SECONDS (23.1-32.0)  H  12/14/15  05:20    














- Constitutional


Appears: Well, Non-toxic, No Acute Distress





- Head Exam


Head Exam: ATRAUMATIC, NORMAL INSPECTION, NORMOCEPHALIC





- Eye Exam


Eye Exam: EOMI, Normal appearance, PERRL


Pupil Exam: NORMAL ACCOMODATION, PERRL





- ENT Exam


ENT Exam: Mucous Membranes Moist, Normal Exam





- Neck Exam


Neck Exam: Full ROM, Normal Inspection.  absent: Lymphadenopathy





- Respiratory Exam


Respiratory Exam: Clear to Ausculation Bilateral, NORMAL BREATHING PATTERN





- Cardiovascular Exam


Cardiovascular Exam: REGULAR RHYTHM, RRR, +S1, +S2.  absent: Murmur





- GI/Abdominal Exam


GI & Abdominal Exam: Soft, Normal Bowel Sounds.  absent: Tenderness





- Extremities Exam


Extremities Exam: Full ROM, Normal Capillary Refill, Normal Inspection.  absent

: Joint Swelling, Pedal Edema





- Back Exam


Back Exam: NORMAL INSPECTION





- Neurological Exam


Neurological Exam: Alert, Awake, CN II-XII Intact, Normal Gait, Oriented x3





- Psychiatric Exam


Psychiatric exam: Normal Affect, Normal Mood





- Skin


Skin Exam: Dry, Intact, Normal Color, Warm





Assessment and Plan


(1) DVT prophylaxis


Status: Chronic   





(2) Scrotal edema


Status: Chronic   





(3) CAD (coronary artery disease)


Status: Chronic   





(4) Smoking


Status: Chronic   





(5) Low back pain


Status: Chronic   





(6) Prediabetes


Status: Chronic   





(7) Neurocognitive disorder


Status: Chronic

## 2017-08-11 RX ADMIN — DIVALPROEX SODIUM SCH MG: 500 TABLET, DELAYED RELEASE ORAL at 08:46

## 2017-08-11 RX ADMIN — DIVALPROEX SODIUM SCH MG: 500 TABLET, DELAYED RELEASE ORAL at 16:01

## 2017-08-11 NOTE — CP.PCM.PN
Subjective





- Date & Time of Evaluation


Date of Evaluation: 08/11/17


Time of Evaluation: 09:06





- Subjective


Subjective: 





S/O no complaints/distress, no f/c n/v/d. pending placement


A/P cont current plan





Objective





- Vital Signs/Intake and Output


Vital Signs (last 24 hours): 


 











Temp Pulse Resp BP Pulse Ox


 


 97.3 F L  63   18   103/74   99 


 


 08/11/17 07:38  08/11/17 07:38  08/11/17 07:38  08/11/17 07:38  08/11/17 07:38











- Medications


Medications: 


 Current Medications





Atorvastatin Calcium (Lipitor)  20 mg PO HS [facility]


   Last Admin: 08/10/17 21:26 Dose:  20 mg


Divalproex Sodium (Depakote Dr(*Bid*))  500 mg PO BID [facility]


   Last Admin: 08/11/17 08:46 Dose:  500 mg


Enalapril Maleate (Vasotec)  10 mg PO DAILY [facility]


   Last Admin: 08/11/17 08:46 Dose:  10 mg


Fenofibrate (Tricor)  145 mg PO DAILY [facility]


   Last Admin: 08/11/17 08:46 Dose:  145 mg


Metoprolol Tartrate (Lopressor)  50 mg PO Q12 [facility]


   Last Admin: 08/11/17 08:46 Dose:  50 mg


Quetiapine Fumarate (Seroquel)  25 mg PO HS [facility]


   Last Admin: 08/10/17 21:26 Dose:  25 mg











- Labs


Labs: 


 





 07/13/17 04:20 





 07/13/17 04:20 





 











PT  11.2 SECONDS (9.4-12.0)   12/14/15  05:20    


 


INR  1.05  (0.89-1.20)   12/14/15  05:20    


 


APTT  36.2 SECONDS (23.1-32.0)  H  12/14/15  05:20    














- Constitutional


Appears: Well, Non-toxic, No Acute Distress





- Head Exam


Head Exam: ATRAUMATIC, NORMAL INSPECTION, NORMOCEPHALIC





- Eye Exam


Eye Exam: EOMI, Normal appearance, PERRL


Pupil Exam: NORMAL ACCOMODATION, PERRL





- ENT Exam


ENT Exam: Mucous Membranes Moist, Normal Exam





- Neck Exam


Neck Exam: Full ROM, Normal Inspection.  absent: Lymphadenopathy





- Respiratory Exam


Respiratory Exam: Clear to Ausculation Bilateral, NORMAL BREATHING PATTERN





- Cardiovascular Exam


Cardiovascular Exam: REGULAR RHYTHM, RRR, +S1, +S2.  absent: Murmur





- GI/Abdominal Exam


GI & Abdominal Exam: Soft, Normal Bowel Sounds.  absent: Tenderness





- Extremities Exam


Extremities Exam: Full ROM, Normal Capillary Refill, Normal Inspection.  absent

: Joint Swelling, Pedal Edema





- Back Exam


Back Exam: NORMAL INSPECTION





- Neurological Exam


Neurological Exam: Alert, Awake, CN II-XII Intact, Normal Gait, Oriented x3





- Psychiatric Exam


Psychiatric exam: Normal Affect, Normal Mood





- Skin


Skin Exam: Dry, Intact, Normal Color, Warm





Assessment and Plan


(1) DVT prophylaxis


Status: Chronic   





(2) Scrotal edema


Status: Chronic   





(3) CAD (coronary artery disease)


Status: Chronic   





(4) Smoking


Status: Chronic   





(5) Low back pain


Status: Chronic   





(6) Prediabetes


Status: Chronic   





(7) Neurocognitive disorder


Status: Chronic

## 2017-08-12 RX ADMIN — DIVALPROEX SODIUM SCH MG: 500 TABLET, DELAYED RELEASE ORAL at 16:04

## 2017-08-12 RX ADMIN — DIVALPROEX SODIUM SCH MG: 500 TABLET, DELAYED RELEASE ORAL at 09:04

## 2017-08-12 NOTE — CP.PCM.PN
Subjective





- Date & Time of Evaluation


Date of Evaluation: 08/12/17


Time of Evaluation: 09:32





- Subjective


Subjective: 





S/O no complaints/distress, no f/c n/v/d. pending placement


A/P cont current plan





Objective





- Vital Signs/Intake and Output


Vital Signs (last 24 hours): 


 











Temp Pulse Resp BP Pulse Ox


 


 97 F L  68   20   101/68   98 


 


 08/12/17 08:26  08/12/17 09:04  08/12/17 08:26  08/12/17 08:26  08/12/17 08:26











- Medications


Medications: 


 Current Medications





Atorvastatin Calcium (Lipitor)  20 mg PO HS [facility]


   Last Admin: 08/11/17 21:40 Dose:  20 mg


Divalproex Sodium (Depakote Dr(*Bid*))  500 mg PO BID [facility]


   Last Admin: 08/12/17 09:04 Dose:  500 mg


Enalapril Maleate (Vasotec)  10 mg PO DAILY [facility]


   Last Admin: 08/12/17 09:05 Dose:  10 mg


Fenofibrate (Tricor)  145 mg PO DAILY [facility]


   Last Admin: 08/12/17 09:05 Dose:  145 mg


Metoprolol Tartrate (Lopressor)  50 mg PO Q12 [facility]


   Last Admin: 08/12/17 09:04 Dose:  50 mg


Quetiapine Fumarate (Seroquel)  25 mg PO HS [facility]


   Last Admin: 08/11/17 21:40 Dose:  25 mg











- Labs


Labs: 


 





 07/13/17 04:20 





 07/13/17 04:20 





 











PT  11.2 SECONDS (9.4-12.0)   12/14/15  05:20    


 


INR  1.05  (0.89-1.20)   12/14/15  05:20    


 


APTT  36.2 SECONDS (23.1-32.0)  H  12/14/15  05:20    














- Constitutional


Appears: Well, Non-toxic, No Acute Distress





- Head Exam


Head Exam: ATRAUMATIC, NORMAL INSPECTION, NORMOCEPHALIC





- Eye Exam


Eye Exam: EOMI, Normal appearance, PERRL


Pupil Exam: NORMAL ACCOMODATION, PERRL





- ENT Exam


ENT Exam: Mucous Membranes Moist, Normal Exam





- Neck Exam


Neck Exam: Full ROM, Normal Inspection.  absent: Lymphadenopathy





- Respiratory Exam


Respiratory Exam: Clear to Ausculation Bilateral, NORMAL BREATHING PATTERN





- Cardiovascular Exam


Cardiovascular Exam: REGULAR RHYTHM, RRR, +S1, +S2.  absent: Murmur





- GI/Abdominal Exam


GI & Abdominal Exam: Soft, Normal Bowel Sounds.  absent: Tenderness





- Extremities Exam


Extremities Exam: Full ROM, Normal Capillary Refill, Normal Inspection.  absent

: Joint Swelling, Pedal Edema





- Back Exam


Back Exam: NORMAL INSPECTION





- Neurological Exam


Neurological Exam: Alert, Awake, CN II-XII Intact, Normal Gait, Oriented x3





- Psychiatric Exam


Psychiatric exam: Normal Affect, Normal Mood





- Skin


Skin Exam: Dry, Intact, Normal Color, Warm





Assessment and Plan


(1) DVT prophylaxis


Status: Chronic   





(2) Scrotal edema


Status: Chronic   





(3) CAD (coronary artery disease)


Status: Chronic   





(4) Smoking


Status: Chronic   





(5) Low back pain


Status: Chronic   





(6) Prediabetes


Status: Chronic   





(7) Neurocognitive disorder


Status: Chronic

## 2017-08-13 RX ADMIN — DIVALPROEX SODIUM SCH MG: 500 TABLET, DELAYED RELEASE ORAL at 17:04

## 2017-08-13 RX ADMIN — DIVALPROEX SODIUM SCH MG: 500 TABLET, DELAYED RELEASE ORAL at 09:10

## 2017-08-13 NOTE — CP.PCM.PN
Subjective





- Date & Time of Evaluation


Date of Evaluation: 08/13/17


Time of Evaluation: 09:45





- Subjective


Subjective: 





S/O no complaints/distress, no f/c n/v/d. pending placement


A/P cont current plan





Objective





- Vital Signs/Intake and Output


Vital Signs (last 24 hours): 


 











Temp Pulse Resp BP Pulse Ox


 


 97.1 F L  71   20   116/73   97 


 


 08/13/17 08:31  08/13/17 09:10  08/13/17 08:31  08/13/17 09:10  08/13/17 08:31











- Medications


Medications: 


 Current Medications





Atorvastatin Calcium (Lipitor)  20 mg PO [facility]


   Last Admin: 08/12/17 22:38 Dose:  20 mg


Divalproex Sodium (Depakote Dr(*Bid*))  500 mg PO BID [facility]


   Last Admin: 08/13/17 09:10 Dose:  500 mg


Enalapril Maleate (Vasotec)  10 mg PO DAILY [facility]


   Last Admin: 08/13/17 09:11 Dose:  10 mg


Fenofibrate (Tricor)  145 mg PO DAILY [facility]


   Last Admin: 08/13/17 09:11 Dose:  145 mg


Metoprolol Tartrate (Lopressor)  50 mg PO Q12 [facility]


   Last Admin: 08/13/17 09:10 Dose:  50 mg


Quetiapine Fumarate (Seroquel)  25 mg PO HS [facility]


   Last Admin: 08/12/17 22:38 Dose:  25 mg











- Labs


Labs: 


 





 07/13/17 04:20 





 07/13/17 04:20 





 











PT  11.2 SECONDS (9.4-12.0)   12/14/15  05:20    


 


INR  1.05  (0.89-1.20)   12/14/15  05:20    


 


APTT  36.2 SECONDS (23.1-32.0)  H  12/14/15  05:20    














- Constitutional


Appears: Well, Non-toxic, No Acute Distress





- Head Exam


Head Exam: ATRAUMATIC, NORMAL INSPECTION, NORMOCEPHALIC





- Eye Exam


Eye Exam: EOMI, Normal appearance, PERRL


Pupil Exam: NORMAL ACCOMODATION, PERRL





- ENT Exam


ENT Exam: Mucous Membranes Moist, Normal Exam





- Neck Exam


Neck Exam: Full ROM, Normal Inspection.  absent: Lymphadenopathy





- Respiratory Exam


Respiratory Exam: Clear to Ausculation Bilateral, NORMAL BREATHING PATTERN





- Cardiovascular Exam


Cardiovascular Exam: REGULAR RHYTHM, RRR, +S1, +S2.  absent: Murmur





- GI/Abdominal Exam


GI & Abdominal Exam: Soft, Normal Bowel Sounds.  absent: Tenderness





- Extremities Exam


Extremities Exam: Full ROM, Normal Capillary Refill, Normal Inspection.  absent

: Joint Swelling, Pedal Edema





- Back Exam


Back Exam: NORMAL INSPECTION





- Neurological Exam


Neurological Exam: Alert, Awake, CN II-XII Intact, Normal Gait, Oriented x3





- Psychiatric Exam


Psychiatric exam: Normal Affect, Normal Mood





- Skin


Skin Exam: Dry, Intact, Normal Color, Warm





Assessment and Plan


(1) DVT prophylaxis


Status: Chronic   





(2) Scrotal edema


Status: Chronic   





(3) CAD (coronary artery disease)


Status: Chronic   





(4) Smoking


Status: Chronic   





(5) Low back pain


Status: Chronic   





(6) Prediabetes


Status: Chronic   





(7) Neurocognitive disorder


Status: Chronic

## 2017-08-14 RX ADMIN — DIVALPROEX SODIUM SCH MG: 500 TABLET, DELAYED RELEASE ORAL at 16:24

## 2017-08-14 RX ADMIN — DIVALPROEX SODIUM SCH MG: 500 TABLET, DELAYED RELEASE ORAL at 09:31

## 2017-08-14 NOTE — CP.PCM.PN
Subjective





- Date & Time of Evaluation


Date of Evaluation: 08/14/17


Time of Evaluation: 07:20





- Subjective


Subjective: 





S/O no complaints/distress, no f/c n/v/d. pending placement


A/P cont current plan





Objective





- Vital Signs/Intake and Output


Vital Signs (last 24 hours): 


 











Temp Pulse Resp BP Pulse Ox


 


 97.7 F   79   20   99/67 L  96 


 


 08/14/17 00:24  08/14/17 00:24  08/14/17 00:24  08/14/17 00:24  08/14/17 00:24











- Medications


Medications: 


 Current Medications





Atorvastatin Calcium (Lipitor)  20 mg PO [facility]


   Last Admin: 08/13/17 21:04 Dose:  20 mg


Divalproex Sodium (Depakote Dr(*Bid*))  500 mg PO BID [facility]


   Last Admin: 08/13/17 17:04 Dose:  500 mg


Enalapril Maleate (Vasotec)  10 mg PO DAILY [facility]


   Last Admin: 08/13/17 09:11 Dose:  10 mg


Fenofibrate (Tricor)  145 mg PO DAILY [facility]


   Last Admin: 08/13/17 09:11 Dose:  145 mg


Metoprolol Tartrate (Lopressor)  50 mg PO Q12 [facility]


   Last Admin: 08/13/17 21:04 Dose:  Not Given


Quetiapine Fumarate (Seroquel)  25 mg PO HS [facility]


   Last Admin: 08/13/17 21:04 Dose:  25 mg











- Labs


Labs: 


 





 07/13/17 04:20 





 07/13/17 04:20 





 











PT  11.2 SECONDS (9.4-12.0)   12/14/15  05:20    


 


INR  1.05  (0.89-1.20)   12/14/15  05:20    


 


APTT  36.2 SECONDS (23.1-32.0)  H  12/14/15  05:20    














- Constitutional


Appears: Well, Non-toxic, No Acute Distress





- Head Exam


Head Exam: ATRAUMATIC, NORMAL INSPECTION, NORMOCEPHALIC





- Eye Exam


Eye Exam: EOMI, Normal appearance, PERRL


Pupil Exam: NORMAL ACCOMODATION, PERRL





- ENT Exam


ENT Exam: Mucous Membranes Moist, Normal Exam





- Neck Exam


Neck Exam: Full ROM, Normal Inspection.  absent: Lymphadenopathy





- Respiratory Exam


Respiratory Exam: Clear to Ausculation Bilateral, NORMAL BREATHING PATTERN





- Cardiovascular Exam


Cardiovascular Exam: REGULAR RHYTHM, RRR, +S1, +S2.  absent: Murmur





- GI/Abdominal Exam


GI & Abdominal Exam: Soft, Normal Bowel Sounds.  absent: Tenderness





- Extremities Exam


Extremities Exam: Full ROM, Normal Capillary Refill, Normal Inspection.  absent

: Joint Swelling, Pedal Edema





- Back Exam


Back Exam: NORMAL INSPECTION





- Neurological Exam


Neurological Exam: Alert, Awake, CN II-XII Intact, Normal Gait, Oriented x3





- Psychiatric Exam


Psychiatric exam: Normal Affect, Normal Mood





- Skin


Skin Exam: Dry, Intact, Normal Color, Warm





Assessment and Plan


(1) DVT prophylaxis


Status: Chronic   





(2) Scrotal edema


Status: Chronic   





(3) CAD (coronary artery disease)


Status: Chronic   





(4) Smoking


Status: Chronic   





(5) Low back pain


Status: Chronic   





(6) Prediabetes


Status: Chronic   





(7) Neurocognitive disorder


Status: Chronic

## 2017-08-15 RX ADMIN — DIVALPROEX SODIUM SCH MG: 500 TABLET, DELAYED RELEASE ORAL at 16:36

## 2017-08-15 RX ADMIN — DIVALPROEX SODIUM SCH MG: 500 TABLET, DELAYED RELEASE ORAL at 08:32

## 2017-08-16 RX ADMIN — DIVALPROEX SODIUM SCH MG: 500 TABLET, DELAYED RELEASE ORAL at 09:28

## 2017-08-16 RX ADMIN — DIVALPROEX SODIUM SCH MG: 500 TABLET, DELAYED RELEASE ORAL at 17:28

## 2017-08-16 NOTE — CP.PCM.PN
Subjective





- Date & Time of Evaluation


Date of Evaluation: 08/16/17


Time of Evaluation: 08:12





- Subjective


Subjective: 





s/o pt got agitated yesterday, asking to be dc. no f/c, n/v/d. no other 

complaints. calmer


cont plan, cont placement, klonpin prn, cont seroquel





Objective





- Vital Signs/Intake and Output


Vital Signs (last 24 hours): 


 











Temp Pulse Resp BP Pulse Ox


 


 97.8 F   73   19   98/66 L  97 


 


 08/16/17 00:46  08/16/17 00:46  08/16/17 00:46  08/16/17 00:46  08/16/17 00:46











- Medications


Medications: 


 Current Medications





Atorvastatin Calcium (Lipitor)  20 mg PO HS [facility]


   Last Admin: 08/15/17 21:14 Dose:  20 mg


Clonazepam (Klonopin)  0.5 mg PO Q12 [facility]


   PRN Reason: Agitation


   Last Admin: 08/15/17 08:32 Dose:  0.5 mg


Divalproex Sodium (Depakote Dr(*Bid*))  500 mg PO BID [facility]


   Last Admin: 08/15/17 16:36 Dose:  500 mg


Enalapril Maleate (Vasotec)  10 mg PO DAILY [facility]


   Last Admin: 08/15/17 08:34 Dose:  10 mg


Fenofibrate (Tricor)  145 mg PO DAILY [facility]


   Last Admin: 08/15/17 08:33 Dose:  145 mg


Metoprolol Tartrate (Lopressor)  50 mg PO Q12 [facility]


   Last Admin: 08/15/17 21:14 Dose:  50 mg


Quetiapine Fumarate (Seroquel)  25 mg PO HS [facility]


   Last Admin: 08/15/17 21:14 Dose:  25 mg











- Labs


Labs: 


 





 07/13/17 04:20 





 07/13/17 04:20 





 











PT  11.2 SECONDS (9.4-12.0)   12/14/15  05:20    


 


INR  1.05  (0.89-1.20)   12/14/15  05:20    


 


APTT  36.2 SECONDS (23.1-32.0)  H  12/14/15  05:20    














- Constitutional


Appears: Well, Non-toxic, No Acute Distress





- Head Exam


Head Exam: ATRAUMATIC, NORMAL INSPECTION, NORMOCEPHALIC





- Eye Exam


Eye Exam: EOMI, Normal appearance, PERRL


Pupil Exam: NORMAL ACCOMODATION, PERRL





- ENT Exam


ENT Exam: Mucous Membranes Moist, Normal Exam





- Neck Exam


Neck Exam: Full ROM, Normal Inspection.  absent: Lymphadenopathy





- Respiratory Exam


Respiratory Exam: Clear to Ausculation Bilateral, NORMAL BREATHING PATTERN





- Cardiovascular Exam


Cardiovascular Exam: REGULAR RHYTHM, RRR, +S1, +S2.  absent: Murmur





- GI/Abdominal Exam


GI & Abdominal Exam: Soft, Normal Bowel Sounds.  absent: Tenderness





- Extremities Exam


Extremities Exam: Full ROM, Normal Capillary Refill, Normal Inspection.  absent

: Joint Swelling, Pedal Edema





- Back Exam


Back Exam: NORMAL INSPECTION





- Neurological Exam


Neurological Exam: Alert, Awake, CN II-XII Intact, Normal Gait, Oriented x3





- Psychiatric Exam


Psychiatric exam: Normal Affect, Normal Mood





- Skin


Skin Exam: Dry, Intact, Normal Color, Warm





Assessment and Plan


(1) DVT prophylaxis


Status: Chronic   





(2) Scrotal edema


Status: Chronic   





(3) CAD (coronary artery disease)


Status: Chronic   





(4) Smoking


Status: Chronic   





(5) Low back pain


Status: Chronic   





(6) Prediabetes


Status: Chronic   





(7) Neurocognitive disorder


Status: Chronic

## 2017-08-17 RX ADMIN — DIVALPROEX SODIUM SCH MG: 500 TABLET, DELAYED RELEASE ORAL at 16:41

## 2017-08-17 RX ADMIN — DIVALPROEX SODIUM SCH MG: 500 TABLET, DELAYED RELEASE ORAL at 09:43

## 2017-08-17 NOTE — CP.PCM.PN
Subjective





- Date & Time of Evaluation


Date of Evaluation: 08/17/17


Time of Evaluation: 08:29





- Subjective


Subjective: 





s/o pt got agitated yesterday, asking to be dc. no f/c, n/v/d. no other 

complaints. calmer


cont plan, cont placement, klonpin prn, cont seroquel





Objective





- Vital Signs/Intake and Output


Vital Signs (last 24 hours): 


 











Temp Pulse Resp BP Pulse Ox


 


 98.6 F   65   20   99/68 L  99 


 


 08/17/17 07:44  08/17/17 07:44  08/17/17 07:44  08/17/17 07:44  08/17/17 07:44











- Medications


Medications: 


 Current Medications





Atorvastatin Calcium (Lipitor)  20 mg PO [facility]


   Last Admin: 08/16/17 21:07 Dose:  20 mg


Clonazepam (Klonopin)  0.5 mg PO Q12 PRN


   PRN Reason: Agitation


   Last Admin: 08/16/17 09:28 Dose:  0.5 mg


Divalproex Sodium (Depakote Dr(*Bid*))  500 mg PO BID [facility]


   Last Admin: 08/16/17 17:28 Dose:  500 mg


Enalapril Maleate (Vasotec)  10 mg PO DAILY [facility]


   Last Admin: 08/16/17 09:30 Dose:  10 mg


Fenofibrate (Tricor)  145 mg PO DAILY [facility]


   Last Admin: 08/16/17 09:29 Dose:  145 mg


Metoprolol Tartrate (Lopressor)  50 mg PO Q12 [facility]


   Last Admin: 08/16/17 21:07 Dose:  50 mg


Quetiapine Fumarate (Seroquel)  25 mg PO HS [facility]


   Last Admin: 08/16/17 21:07 Dose:  25 mg











- Labs


Labs: 


 





 07/13/17 04:20 





 07/13/17 04:20 





 











PT  11.2 SECONDS (9.4-12.0)   12/14/15  05:20    


 


INR  1.05  (0.89-1.20)   12/14/15  05:20    


 


APTT  36.2 SECONDS (23.1-32.0)  H  12/14/15  05:20    














- Constitutional


Appears: Well, Non-toxic, No Acute Distress





- Head Exam


Head Exam: ATRAUMATIC, NORMAL INSPECTION, NORMOCEPHALIC





- Eye Exam


Eye Exam: EOMI, Normal appearance, PERRL


Pupil Exam: NORMAL ACCOMODATION, PERRL





- ENT Exam


ENT Exam: Mucous Membranes Moist, Normal Exam





- Neck Exam


Neck Exam: Full ROM, Normal Inspection.  absent: Lymphadenopathy





- Respiratory Exam


Respiratory Exam: Clear to Ausculation Bilateral, NORMAL BREATHING PATTERN





- Cardiovascular Exam


Cardiovascular Exam: REGULAR RHYTHM, RRR, +S1, +S2.  absent: Murmur





- GI/Abdominal Exam


GI & Abdominal Exam: Soft, Normal Bowel Sounds.  absent: Tenderness





- Extremities Exam


Extremities Exam: Full ROM, Normal Capillary Refill, Normal Inspection.  absent

: Joint Swelling, Pedal Edema





- Back Exam


Back Exam: NORMAL INSPECTION





- Neurological Exam


Neurological Exam: Alert, Awake, CN II-XII Intact, Normal Gait, Oriented x3





- Psychiatric Exam


Psychiatric exam: Normal Affect, Normal Mood





- Skin


Skin Exam: Dry, Intact, Normal Color, Warm





Assessment and Plan


(1) DVT prophylaxis


Status: Chronic   





(2) Scrotal edema


Status: Chronic   





(3) CAD (coronary artery disease)


Status: Chronic   





(4) Smoking


Status: Chronic   





(5) Low back pain


Status: Chronic   





(6) Prediabetes


Status: Chronic   





(7) Neurocognitive disorder


Status: Chronic

## 2017-08-18 RX ADMIN — DIVALPROEX SODIUM SCH MG: 500 TABLET, DELAYED RELEASE ORAL at 08:37

## 2017-08-18 RX ADMIN — DIVALPROEX SODIUM SCH MG: 500 TABLET, DELAYED RELEASE ORAL at 16:05

## 2017-08-18 NOTE — CP.PCM.PN
Subjective





- Date & Time of Evaluation


Date of Evaluation: 08/18/17


Time of Evaluation: 09:30





- Subjective


Subjective: 





s/o pt got agitated yesterday, asking to be dc. no f/c, n/v/d. no other 

complaints. calmer


cont plan, cont placement, klonpin prn, cont seroquel





Objective





- Vital Signs/Intake and Output


Vital Signs (last 24 hours): 


 











Temp Pulse Resp BP Pulse Ox


 


 97.1 F L  71   20   109/71   97 


 


 08/18/17 08:20  08/18/17 08:37  08/18/17 08:20  08/18/17 08:20  08/18/17 08:20











- Medications


Medications: 


 Current Medications





Atorvastatin Calcium (Lipitor)  20 mg PO [facility]


   Last Admin: 08/17/17 21:46 Dose:  20 mg


Clonazepam (Klonopin)  0.5 mg PO Q12 PRN


   PRN Reason: Agitation


   Last Admin: 08/16/17 09:28 Dose:  0.5 mg


Divalproex Sodium (Depakote Dr(*Bid*))  500 mg PO BID [facility]


   Last Admin: 08/18/17 08:37 Dose:  500 mg


Enalapril Maleate (Vasotec)  10 mg PO DAILY [facility]


   Last Admin: 08/18/17 08:38 Dose:  10 mg


Fenofibrate (Tricor)  145 mg PO DAILY [facility]


   Last Admin: 08/18/17 08:37 Dose:  145 mg


Metoprolol Tartrate (Lopressor)  50 mg PO Q12 [facility]


   Last Admin: 08/18/17 08:37 Dose:  50 mg


Quetiapine Fumarate (Seroquel)  25 mg PO HS [facility]


   Last Admin: 08/17/17 21:46 Dose:  25 mg











- Labs


Labs: 


 





 07/13/17 04:20 





 07/13/17 04:20 





 











PT  11.2 SECONDS (9.4-12.0)   12/14/15  05:20    


 


INR  1.05  (0.89-1.20)   12/14/15  05:20    


 


APTT  36.2 SECONDS (23.1-32.0)  H  12/14/15  05:20    














- Constitutional


Appears: Well, Non-toxic, No Acute Distress





- Head Exam


Head Exam: ATRAUMATIC, NORMAL INSPECTION, NORMOCEPHALIC





- Eye Exam


Eye Exam: EOMI, Normal appearance, PERRL


Pupil Exam: NORMAL ACCOMODATION, PERRL





- ENT Exam


ENT Exam: Mucous Membranes Moist, Normal Exam





- Neck Exam


Neck Exam: Full ROM, Normal Inspection.  absent: Lymphadenopathy





- Respiratory Exam


Respiratory Exam: Clear to Ausculation Bilateral, NORMAL BREATHING PATTERN





- Cardiovascular Exam


Cardiovascular Exam: REGULAR RHYTHM, +S1, +S2.  absent: Murmur





- GI/Abdominal Exam


GI & Abdominal Exam: Soft, Normal Bowel Sounds.  absent: Tenderness





- Extremities Exam


Extremities Exam: Full ROM, Normal Capillary Refill, Normal Inspection.  absent

: Joint Swelling, Pedal Edema





- Back Exam


Back Exam: NORMAL INSPECTION





- Neurological Exam


Neurological Exam: Alert, Awake, CN II-XII Intact, Normal Gait, Oriented x3





- Psychiatric Exam


Psychiatric exam: Normal Affect, Normal Mood





- Skin


Skin Exam: Dry, Intact, Normal Color, Warm





Assessment and Plan


(1) DVT prophylaxis


Status: Chronic   





(2) Scrotal edema


Status: Chronic   





(3) CAD (coronary artery disease)


Status: Chronic   





(4) Smoking


Status: Chronic   





(5) Low back pain


Status: Chronic   





(6) Prediabetes


Status: Chronic   





(7) Neurocognitive disorder


Status: Chronic

## 2017-08-19 RX ADMIN — DIVALPROEX SODIUM SCH MG: 500 TABLET, DELAYED RELEASE ORAL at 08:50

## 2017-08-19 RX ADMIN — DIVALPROEX SODIUM SCH MG: 500 TABLET, DELAYED RELEASE ORAL at 16:01

## 2017-08-19 NOTE — CP.PCM.PN
Subjective





- Date & Time of Evaluation


Date of Evaluation: 08/19/17


Time of Evaluation: 08:27





- Subjective


Subjective: 





s/o pt got agitated yesterday, asking to be dc. no f/c, n/v/d. no other 

complaints. calmer


cont plan, cont placement, klonpin prn, cont seroquel





Objective





- Vital Signs/Intake and Output


Vital Signs (last 24 hours): 


 











Temp Pulse Resp BP Pulse Ox


 


 97.5 F L  60   20   100/68   97 


 


 08/19/17 07:46  08/19/17 07:46  08/19/17 07:46  08/19/17 07:46  08/19/17 07:46











- Medications


Medications: 


 Current Medications





Atorvastatin Calcium (Lipitor)  20 mg PO HS [facility]


   Last Admin: 08/18/17 21:37 Dose:  20 mg


Clonazepam (Klonopin)  0.5 mg PO Q12 PRN


   PRN Reason: Agitation


   Last Admin: 08/16/17 09:28 Dose:  0.5 mg


Divalproex Sodium (Depakote Dr(*Bid*))  500 mg PO BID [facility]


   Last Admin: 08/18/17 16:05 Dose:  500 mg


Enalapril Maleate (Vasotec)  10 mg PO DAILY [facility]


   Last Admin: 08/18/17 08:38 Dose:  10 mg


Fenofibrate (Tricor)  145 mg PO DAILY [facility]


   Last Admin: 08/18/17 08:37 Dose:  145 mg


Metoprolol Tartrate (Lopressor)  50 mg PO Q12 [facility]


   Last Admin: 08/18/17 21:37 Dose:  50 mg


Quetiapine Fumarate (Seroquel)  25 mg PO HS [facility]


   Last Admin: 08/18/17 21:37 Dose:  25 mg











- Labs


Labs: 


 





 07/13/17 04:20 





 07/13/17 04:20 





 











PT  11.2 SECONDS (9.4-12.0)   12/14/15  05:20    


 


INR  1.05  (0.89-1.20)   12/14/15  05:20    


 


APTT  36.2 SECONDS (23.1-32.0)  H  12/14/15  05:20    














- Constitutional


Appears: Well, Non-toxic, No Acute Distress





- Head Exam


Head Exam: ATRAUMATIC, NORMAL INSPECTION, NORMOCEPHALIC





- Eye Exam


Eye Exam: EOMI, Normal appearance, PERRL


Pupil Exam: NORMAL ACCOMODATION, PERRL





- ENT Exam


ENT Exam: Mucous Membranes Moist, Normal Exam





- Neck Exam


Neck Exam: Full ROM, Normal Inspection.  absent: Lymphadenopathy





- Respiratory Exam


Respiratory Exam: Clear to Ausculation Bilateral, NORMAL BREATHING PATTERN





- Cardiovascular Exam


Cardiovascular Exam: REGULAR RHYTHM, RRR, +S1, +S2.  absent: Murmur





- GI/Abdominal Exam


GI & Abdominal Exam: Soft, Normal Bowel Sounds.  absent: Tenderness





-  Exam


 Exam: Circumcision, NORMAL INSPECTION





- Extremities Exam


Extremities Exam: Full ROM, Normal Capillary Refill, Normal Inspection.  absent

: Joint Swelling, Pedal Edema





- Back Exam


Back Exam: NORMAL INSPECTION





- Neurological Exam


Neurological Exam: Alert, Awake, CN II-XII Intact, Normal Gait, Oriented x3





- Psychiatric Exam


Psychiatric exam: Normal Affect, Normal Mood





- Skin


Skin Exam: Dry, Intact, Normal Color, Warm





Assessment and Plan


(1) DVT prophylaxis


Status: Chronic   





(2) Scrotal edema


Status: Chronic   





(3) CAD (coronary artery disease)


Status: Chronic   





(4) Smoking


Status: Chronic   





(5) Low back pain


Status: Chronic   





(6) Prediabetes


Status: Chronic   





(7) Neurocognitive disorder


Status: Chronic

## 2017-08-20 RX ADMIN — DIVALPROEX SODIUM SCH MG: 500 TABLET, DELAYED RELEASE ORAL at 18:34

## 2017-08-20 RX ADMIN — DIVALPROEX SODIUM SCH MG: 500 TABLET, DELAYED RELEASE ORAL at 10:57

## 2017-08-20 NOTE — CP.PCM.PN
Subjective





- Date & Time of Evaluation


Date of Evaluation: 08/20/17


Time of Evaluation: 09:32





- Subjective


Subjective: 





s/o no f/c, n/v/d. no other complaints. calmer


a/p cont plan, cont placement, klonpin prn, cont seroquel





Objective





- Vital Signs/Intake and Output


Vital Signs (last 24 hours): 


 











Temp Pulse Resp BP Pulse Ox


 


 97.8 F   63   20   104/71   98 


 


 08/20/17 07:30  08/20/17 07:30  08/20/17 07:30  08/20/17 07:30  08/20/17 07:30











- Medications


Medications: 


 Current Medications





Atorvastatin Calcium (Lipitor)  20 mg PO [facility]


   Last Admin: 08/19/17 21:06 Dose:  20 mg


Clonazepam (Klonopin)  0.5 mg PO Q12 PRN


   PRN Reason: Agitation


   Last Admin: 08/16/17 09:28 Dose:  0.5 mg


Divalproex Sodium (Depakote Dr(*Bid*))  500 mg PO BID [facility]


   Last Admin: 08/19/17 16:01 Dose:  500 mg


Enalapril Maleate (Vasotec)  10 mg PO DAILY [facility]


   Last Admin: 08/19/17 08:51 Dose:  10 mg


Fenofibrate (Tricor)  145 mg PO DAILY [facility]


   Last Admin: 08/19/17 08:50 Dose:  145 mg


Metoprolol Tartrate (Lopressor)  50 mg PO Q12 [facility]


   Last Admin: 08/19/17 21:06 Dose:  50 mg


Quetiapine Fumarate (Seroquel)  25 mg PO [facility]


   Last Admin: 08/19/17 21:06 Dose:  25 mg











- Labs


Labs: 


 





 07/13/17 04:20 





 07/13/17 04:20 





 











PT  11.2 SECONDS (9.4-12.0)   12/14/15  05:20    


 


INR  1.05  (0.89-1.20)   12/14/15  05:20    


 


APTT  36.2 SECONDS (23.1-32.0)  H  12/14/15  05:20    














- Constitutional


Appears: Well, Non-toxic, No Acute Distress





- Head Exam


Head Exam: ATRAUMATIC, NORMAL INSPECTION, NORMOCEPHALIC





- Eye Exam


Eye Exam: EOMI, Normal appearance, PERRL


Pupil Exam: NORMAL ACCOMODATION, PERRL





- ENT Exam


ENT Exam: Mucous Membranes Moist, Normal Exam





- Neck Exam


Neck Exam: Full ROM, Normal Inspection.  absent: Lymphadenopathy





- Respiratory Exam


Respiratory Exam: Clear to Ausculation Bilateral, NORMAL BREATHING PATTERN





- Cardiovascular Exam


Cardiovascular Exam: REGULAR RHYTHM, RRR, +S1, +S2.  absent: Murmur





- GI/Abdominal Exam


GI & Abdominal Exam: Soft, Normal Bowel Sounds.  absent: Tenderness





- Extremities Exam


Extremities Exam: Full ROM, Normal Capillary Refill, Normal Inspection.  absent

: Joint Swelling, Pedal Edema





- Back Exam


Back Exam: NORMAL INSPECTION





- Neurological Exam


Neurological Exam: Alert, Awake, CN II-XII Intact, Normal Gait, Oriented x3





- Psychiatric Exam


Psychiatric exam: Normal Affect, Normal Mood





- Skin


Skin Exam: Dry, Intact, Normal Color, Warm





Assessment and Plan


(1) DVT prophylaxis


Status: Chronic   





(2) Scrotal edema


Status: Chronic   





(3) CAD (coronary artery disease)


Status: Chronic   





(4) Smoking


Status: Chronic   





(5) Low back pain


Status: Chronic   





(6) Prediabetes


Status: Chronic   





(7) Neurocognitive disorder


Status: Chronic

## 2017-08-21 RX ADMIN — DIVALPROEX SODIUM SCH MG: 500 TABLET, DELAYED RELEASE ORAL at 09:18

## 2017-08-21 RX ADMIN — DIVALPROEX SODIUM SCH MG: 500 TABLET, DELAYED RELEASE ORAL at 17:13

## 2017-08-21 NOTE — CP.PCM.PN
Subjective





- Date & Time of Evaluation


Date of Evaluation: 08/21/17


Time of Evaluation: 08:03





- Subjective


Subjective: 





s/o no f/c, n/v/d. no other complaints. calmer


a/p cont plan, cont placement, klonpin prn, cont seroquel





Objective





- Vital Signs/Intake and Output


Vital Signs (last 24 hours): 


 











Temp Pulse Resp BP Pulse Ox


 


 97.3 F L  60   20   101/69   97 


 


 08/21/17 07:30  08/21/17 07:30  08/21/17 07:30  08/21/17 07:30  08/21/17 07:30











- Medications


Medications: 


 Current Medications





Atorvastatin Calcium (Lipitor)  20 mg PO HS [facility]


   Last Admin: 08/20/17 21:10 Dose:  20 mg


Clonazepam (Klonopin)  0.5 mg PO Q12 PRN


   PRN Reason: Agitation


   Last Admin: 08/16/17 09:28 Dose:  0.5 mg


Divalproex Sodium (Depakote Dr(*Bid*))  500 mg PO BID [facility]


   Last Admin: 08/20/17 18:34 Dose:  500 mg


Enalapril Maleate (Vasotec)  10 mg PO DAILY [facility]


   Last Admin: 08/20/17 10:58 Dose:  10 mg


Fenofibrate (Tricor)  145 mg PO DAILY [facility]


   Last Admin: 08/20/17 10:58 Dose:  145 mg


Metoprolol Tartrate (Lopressor)  50 mg PO Q12 [facility]


   Last Admin: 08/20/17 21:11 Dose:  50 mg


Quetiapine Fumarate (Seroquel)  25 mg PO [facility]


   Last Admin: 08/20/17 21:11 Dose:  25 mg











- Labs


Labs: 


 





 07/13/17 04:20 





 07/13/17 04:20 





 











PT  11.2 SECONDS (9.4-12.0)   12/14/15  05:20    


 


INR  1.05  (0.89-1.20)   12/14/15  05:20    


 


APTT  36.2 SECONDS (23.1-32.0)  H  12/14/15  05:20    














- Constitutional


Appears: Well, Non-toxic, No Acute Distress





- Head Exam


Head Exam: ATRAUMATIC, NORMAL INSPECTION, NORMOCEPHALIC





- Eye Exam


Eye Exam: EOMI, Normal appearance, PERRL


Pupil Exam: NORMAL ACCOMODATION, PERRL





- ENT Exam


ENT Exam: Mucous Membranes Moist, Normal Exam





- Neck Exam


Neck Exam: Full ROM, Normal Inspection.  absent: Lymphadenopathy





- Respiratory Exam


Respiratory Exam: Clear to Ausculation Bilateral, NORMAL BREATHING PATTERN





- Cardiovascular Exam


Cardiovascular Exam: REGULAR RHYTHM, RRR, +S1, +S2.  absent: Murmur





- GI/Abdominal Exam


GI & Abdominal Exam: Soft, Normal Bowel Sounds.  absent: Tenderness





- Extremities Exam


Extremities Exam: Full ROM, Normal Capillary Refill, Normal Inspection.  absent

: Joint Swelling, Pedal Edema





- Back Exam


Back Exam: NORMAL INSPECTION





- Neurological Exam


Neurological Exam: Alert, Awake, CN II-XII Intact, Normal Gait, Oriented x3





- Psychiatric Exam


Psychiatric exam: Normal Affect, Normal Mood





- Skin


Skin Exam: Dry, Intact, Normal Color, Warm





Assessment and Plan


(1) DVT prophylaxis


Status: Chronic   





(2) Scrotal edema


Status: Chronic   





(3) CAD (coronary artery disease)


Status: Chronic   





(4) Smoking


Status: Chronic   





(5) Low back pain


Status: Chronic   





(6) Prediabetes


Status: Chronic   





(7) Neurocognitive disorder


Status: Chronic

## 2017-08-22 RX ADMIN — DIVALPROEX SODIUM SCH MG: 500 TABLET, DELAYED RELEASE ORAL at 09:05

## 2017-08-22 RX ADMIN — DIVALPROEX SODIUM SCH MG: 500 TABLET, DELAYED RELEASE ORAL at 17:06

## 2017-08-22 NOTE — CP.PCM.PN
Subjective





- Date & Time of Evaluation


Date of Evaluation: 08/22/17


Time of Evaluation: 07:12





- Subjective


Subjective: 





s/o no f/c, n/v/d. no other complaints. calmer


a/p cont plan, cont placement, klonpin prn, cont seroquel





Objective





- Vital Signs/Intake and Output


Vital Signs (last 24 hours): 


 











Temp Pulse Resp BP Pulse Ox


 


 97.7 F   70   20   107/71   95 


 


 08/22/17 01:23  08/22/17 01:23  08/22/17 01:23  08/22/17 01:23  08/22/17 01:23











- Medications


Medications: 


 Current Medications





Atorvastatin Calcium (Lipitor)  20 mg PO [facility]


   Last Admin: 08/21/17 21:24 Dose:  20 mg


Clonazepam (Klonopin)  0.5 mg PO Q12 PRN


   PRN Reason: Agitation


   Last Admin: 08/16/17 09:28 Dose:  0.5 mg


Divalproex Sodium (Depakote Dr(*Bid*))  500 mg PO BID [facility]


   Last Admin: 08/21/17 17:13 Dose:  500 mg


Enalapril Maleate (Vasotec)  10 mg PO DAILY [facility]


   Last Admin: 08/21/17 09:20 Dose:  10 mg


Fenofibrate (Tricor)  145 mg PO DAILY [facility]


   Last Admin: 08/21/17 09:20 Dose:  145 mg


Metoprolol Tartrate (Lopressor)  50 mg PO Q12 [facility]


   Last Admin: 08/21/17 21:21 Dose:  50 mg


Quetiapine Fumarate (Seroquel)  25 mg PO [facility]


   Last Admin: 08/21/17 21:24 Dose:  25 mg











- Labs


Labs: 


 





 07/13/17 04:20 





 07/13/17 04:20 





 











PT  11.2 SECONDS (9.4-12.0)   12/14/15  05:20    


 


INR  1.05  (0.89-1.20)   12/14/15  05:20    


 


APTT  36.2 SECONDS (23.1-32.0)  H  12/14/15  05:20    














- Constitutional


Appears: Well, Non-toxic, No Acute Distress





- Head Exam


Head Exam: ATRAUMATIC, NORMAL INSPECTION, NORMOCEPHALIC





- Eye Exam


Eye Exam: EOMI, Normal appearance, PERRL


Pupil Exam: NORMAL ACCOMODATION, PERRL





- ENT Exam


ENT Exam: Mucous Membranes Moist, Normal Exam





- Neck Exam


Neck Exam: Full ROM, Normal Inspection.  absent: Lymphadenopathy





- Respiratory Exam


Respiratory Exam: Clear to Ausculation Bilateral, NORMAL BREATHING PATTERN





- Cardiovascular Exam


Cardiovascular Exam: REGULAR RHYTHM, RRR, +S1, +S2.  absent: Murmur





- GI/Abdominal Exam


GI & Abdominal Exam: Soft, Normal Bowel Sounds.  absent: Tenderness





- Rectal Exam


Rectal Exam: NORMAL INSPECTION





- Extremities Exam


Extremities Exam: Full ROM, Normal Capillary Refill, Normal Inspection.  absent

: Joint Swelling, Pedal Edema





- Back Exam


Back Exam: NORMAL INSPECTION





- Neurological Exam


Neurological Exam: Alert, Awake, CN II-XII Intact, Normal Gait, Oriented x3





- Psychiatric Exam


Psychiatric exam: Normal Affect, Normal Mood





- Skin


Skin Exam: Dry, Intact, Normal Color, Warm





Assessment and Plan


(1) DVT prophylaxis


Status: Chronic   





(2) Scrotal edema


Status: Chronic   





(3) CAD (coronary artery disease)


Status: Chronic   





(4) Smoking


Status: Chronic   





(5) Low back pain


Status: Chronic   





(6) Prediabetes


Status: Chronic   





(7) Neurocognitive disorder


Status: Chronic

## 2017-08-23 RX ADMIN — DIVALPROEX SODIUM SCH MG: 500 TABLET, DELAYED RELEASE ORAL at 09:05

## 2017-08-23 RX ADMIN — DIVALPROEX SODIUM SCH MG: 500 TABLET, DELAYED RELEASE ORAL at 16:14

## 2017-08-23 NOTE — CP.PCM.PN
Subjective





- Date & Time of Evaluation


Date of Evaluation: 08/23/17


Time of Evaluation: 08:02





- Subjective


Subjective: 





s/o no f/c, n/v/d. no other complaints. calmer


a/p cont plan, cont placement, klonpin prn, cont seroquel





Objective





- Vital Signs/Intake and Output


Vital Signs (last 24 hours): 


 











Temp Pulse Resp BP Pulse Ox


 


 98.6 F   80   20   102/66   98 


 


 08/23/17 00:45  08/23/17 00:45  08/23/17 00:45  08/23/17 00:45  08/23/17 00:45











- Medications


Medications: 


 Current Medications





Atorvastatin Calcium (Lipitor)  20 mg PO [facility]


   Last Admin: 08/22/17 21:31 Dose:  20 mg


Clonazepam (Klonopin)  0.5 mg PO Q12 PRN


   PRN Reason: Agitation


   Last Admin: 08/16/17 09:28 Dose:  0.5 mg


Divalproex Sodium (Depakote Dr(*Bid*))  500 mg PO BID [facility]


   Last Admin: 08/22/17 17:06 Dose:  500 mg


Enalapril Maleate (Vasotec)  10 mg PO DAILY [facility]


   Last Admin: 08/22/17 09:05 Dose:  10 mg


Fenofibrate (Tricor)  145 mg PO DAILY [facility]


   Last Admin: 08/22/17 09:05 Dose:  145 mg


Metoprolol Tartrate (Lopressor)  50 mg PO Q12 [facility]


   Last Admin: 08/22/17 21:28 Dose:  50 mg


Quetiapine Fumarate (Seroquel)  25 mg PO [facility]


   Last Admin: 08/22/17 21:31 Dose:  25 mg











- Labs


Labs: 


 





 07/13/17 04:20 





 07/13/17 04:20 





 











PT  11.2 SECONDS (9.4-12.0)   12/14/15  05:20    


 


INR  1.05  (0.89-1.20)   12/14/15  05:20    


 


APTT  36.2 SECONDS (23.1-32.0)  H  12/14/15  05:20    














- Constitutional


Appears: Well, Non-toxic, No Acute Distress





- Head Exam


Head Exam: ATRAUMATIC, NORMAL INSPECTION, NORMOCEPHALIC





- Eye Exam


Eye Exam: EOMI, Normal appearance, PERRL


Pupil Exam: NORMAL ACCOMODATION, PERRL





- ENT Exam


ENT Exam: Mucous Membranes Moist, Normal Exam





- Neck Exam


Neck Exam: Full ROM, Normal Inspection.  absent: Lymphadenopathy





- Respiratory Exam


Respiratory Exam: Clear to Ausculation Bilateral, NORMAL BREATHING PATTERN





- Cardiovascular Exam


Cardiovascular Exam: REGULAR RHYTHM, RRR, +S1, +S2.  absent: Murmur





- GI/Abdominal Exam


GI & Abdominal Exam: Soft, Normal Bowel Sounds.  absent: Tenderness





- Extremities Exam


Extremities Exam: Full ROM, Normal Capillary Refill, Normal Inspection.  absent

: Joint Swelling, Pedal Edema





- Back Exam


Back Exam: NORMAL INSPECTION





- Neurological Exam


Neurological Exam: Alert, Awake, CN II-XII Intact, Normal Gait, Oriented x3





- Psychiatric Exam


Psychiatric exam: Normal Affect, Normal Mood





- Skin


Skin Exam: Dry, Intact, Normal Color, Warm





Assessment and Plan


(1) DVT prophylaxis


Status: Chronic   





(2) Scrotal edema


Status: Chronic   





(3) CAD (coronary artery disease)


Status: Chronic   





(4) Smoking


Status: Chronic   





(5) Low back pain


Status: Chronic   





(6) Prediabetes


Status: Chronic   





(7) Neurocognitive disorder


Status: Chronic

## 2017-08-24 RX ADMIN — DIVALPROEX SODIUM SCH MG: 500 TABLET, DELAYED RELEASE ORAL at 08:34

## 2017-08-24 RX ADMIN — DIVALPROEX SODIUM SCH MG: 500 TABLET, DELAYED RELEASE ORAL at 16:50

## 2017-08-24 NOTE — CP.PCM.PN
Subjective





- Date & Time of Evaluation


Date of Evaluation: 08/24/17


Time of Evaluation: 07:29





- Subjective


Subjective: 





s/o no f/c, n/v/d. no other complaints. calmer


a/p cont plan, cont placement, klonpin prn, cont seroquel





Objective





- Vital Signs/Intake and Output


Vital Signs (last 24 hours): 


 











Temp Pulse Resp BP Pulse Ox


 


 97.3 F L  62   20   95/67 L  97 


 


 08/24/17 07:28  08/24/17 07:28  08/24/17 07:28  08/24/17 07:28  08/24/17 07:28











- Medications


Medications: 


 Current Medications





Atorvastatin Calcium (Lipitor)  20 mg PO HS [facility]


   Last Admin: 08/23/17 21:06 Dose:  20 mg


Clonazepam (Klonopin)  0.5 mg PO Q12 PRN


   PRN Reason: Agitation


   Last Admin: 08/16/17 09:28 Dose:  0.5 mg


Divalproex Sodium (Depakote Dr(*Bid*))  500 mg PO BID [facility]


   Last Admin: 08/23/17 16:14 Dose:  500 mg


Enalapril Maleate (Vasotec)  10 mg PO DAILY [facility]


   Last Admin: 08/23/17 09:06 Dose:  10 mg


Fenofibrate (Tricor)  145 mg PO DAILY [facility]


   Last Admin: 08/23/17 09:06 Dose:  145 mg


Metoprolol Tartrate (Lopressor)  50 mg PO Q12 [facility]


   Last Admin: 08/23/17 21:06 Dose:  50 mg


Quetiapine Fumarate (Seroquel)  25 mg PO [facility]


   Last Admin: 08/23/17 21:06 Dose:  25 mg











- Labs


Labs: 


 





 07/13/17 04:20 





 07/13/17 04:20 





 











PT  11.2 SECONDS (9.4-12.0)   12/14/15  05:20    


 


INR  1.05  (0.89-1.20)   12/14/15  05:20    


 


APTT  36.2 SECONDS (23.1-32.0)  H  12/14/15  05:20    














- Constitutional


Appears: Well, Non-toxic, No Acute Distress





- Head Exam


Head Exam: ATRAUMATIC, NORMAL INSPECTION, NORMOCEPHALIC





- Eye Exam


Eye Exam: EOMI, Normal appearance, PERRL


Pupil Exam: NORMAL ACCOMODATION, PERRL





- ENT Exam


ENT Exam: Mucous Membranes Moist, Normal Exam





- Neck Exam


Neck Exam: Full ROM, Normal Inspection.  absent: Lymphadenopathy





- Respiratory Exam


Respiratory Exam: Clear to Ausculation Bilateral, NORMAL BREATHING PATTERN





- Cardiovascular Exam


Cardiovascular Exam: REGULAR RHYTHM, RRR, +S1, +S2.  absent: Murmur





- GI/Abdominal Exam


GI & Abdominal Exam: Soft, Normal Bowel Sounds.  absent: Tenderness





- Extremities Exam


Extremities Exam: Full ROM, Normal Capillary Refill, Normal Inspection.  absent

: Joint Swelling, Pedal Edema





- Back Exam


Back Exam: NORMAL INSPECTION





- Neurological Exam


Neurological Exam: Alert, Awake, CN II-XII Intact, Normal Gait, Oriented x3





- Psychiatric Exam


Psychiatric exam: Normal Affect, Normal Mood





- Skin


Skin Exam: Dry, Intact, Normal Color, Warm





Assessment and Plan


(1) DVT prophylaxis


Status: Chronic   





(2) Scrotal edema


Status: Chronic   





(3) CAD (coronary artery disease)


Status: Chronic   





(4) Smoking


Status: Chronic   





(5) Low back pain


Status: Chronic   





(6) Prediabetes


Status: Chronic   





(7) Neurocognitive disorder


Status: Chronic

## 2017-08-25 RX ADMIN — DIVALPROEX SODIUM SCH MG: 500 TABLET, DELAYED RELEASE ORAL at 16:33

## 2017-08-25 RX ADMIN — DIVALPROEX SODIUM SCH MG: 500 TABLET, DELAYED RELEASE ORAL at 08:42

## 2017-08-25 NOTE — CP.PCM.PN
Subjective





- Date & Time of Evaluation


Date of Evaluation: 08/25/17


Time of Evaluation: 07:29





- Subjective


Subjective: 





s/o no f/c, n/v/d. no other complaints. calmer


a/p cont plan, cont placement, klonpin prn, cont seroquel





Objective





- Vital Signs/Intake and Output


Vital Signs (last 24 hours): 


 











Temp Pulse Resp BP Pulse Ox


 


 97.5 F L  87   20   100/65   96 


 


 08/25/17 01:00  08/25/17 01:00  08/25/17 01:00  08/25/17 01:00  08/25/17 01:00











- Medications


Medications: 


 Current Medications





Atorvastatin Calcium (Lipitor)  20 mg PO [facility]


   Last Admin: 08/24/17 21:53 Dose:  20 mg


Clonazepam (Klonopin)  0.5 mg PO Q12 PRN


   PRN Reason: Agitation


   Last Admin: 08/16/17 09:28 Dose:  0.5 mg


Divalproex Sodium (Depakote Dr(*Bid*))  500 mg PO BID [facility]


   Last Admin: 08/24/17 16:50 Dose:  500 mg


Enalapril Maleate (Vasotec)  10 mg PO DAILY [facility]


   Last Admin: 08/24/17 08:35 Dose:  10 mg


Fenofibrate (Tricor)  145 mg PO DAILY [facility]


   Last Admin: 08/24/17 08:34 Dose:  145 mg


Metoprolol Tartrate (Lopressor)  50 mg PO Q12 [facility]


   Last Admin: 08/24/17 21:53 Dose:  50 mg


Quetiapine Fumarate (Seroquel)  25 mg PO [facility]


   Last Admin: 08/24/17 21:53 Dose:  25 mg











- Labs


Labs: 


 





 07/13/17 04:20 





 07/13/17 04:20 





 











PT  11.2 SECONDS (9.4-12.0)   12/14/15  05:20    


 


INR  1.05  (0.89-1.20)   12/14/15  05:20    


 


APTT  36.2 SECONDS (23.1-32.0)  H  12/14/15  05:20    














- Constitutional


Appears: Well, Non-toxic, No Acute Distress





- Head Exam


Head Exam: ATRAUMATIC, NORMAL INSPECTION, NORMOCEPHALIC





- Eye Exam


Eye Exam: EOMI, Normal appearance, PERRL


Pupil Exam: NORMAL ACCOMODATION, PERRL





- ENT Exam


ENT Exam: Mucous Membranes Moist, Normal Exam





- Neck Exam


Neck Exam: Full ROM, Normal Inspection.  absent: Lymphadenopathy





- Respiratory Exam


Respiratory Exam: Clear to Ausculation Bilateral, NORMAL BREATHING PATTERN





- Cardiovascular Exam


Cardiovascular Exam: REGULAR RHYTHM, RRR, +S1, +S2.  absent: Murmur





- GI/Abdominal Exam


GI & Abdominal Exam: Soft, Normal Bowel Sounds.  absent: Tenderness





- Extremities Exam


Extremities Exam: Full ROM, Normal Capillary Refill, Normal Inspection.  absent

: Joint Swelling, Pedal Edema





- Back Exam


Back Exam: NORMAL INSPECTION





- Neurological Exam


Neurological Exam: Alert, Awake, CN II-XII Intact, Normal Gait, Oriented x3





- Psychiatric Exam


Psychiatric exam: Normal Affect, Normal Mood





- Skin


Skin Exam: Dry, Intact, Normal Color, Warm





Assessment and Plan


(1) DVT prophylaxis


Status: Chronic   





(2) Scrotal edema


Status: Chronic   





(3) CAD (coronary artery disease)


Status: Chronic   





(4) Smoking


Status: Chronic   





(5) Low back pain


Status: Chronic   





(6) Prediabetes


Status: Chronic   





(7) Neurocognitive disorder


Status: Chronic

## 2017-08-26 RX ADMIN — DIVALPROEX SODIUM SCH MG: 500 TABLET, DELAYED RELEASE ORAL at 09:38

## 2017-08-26 RX ADMIN — DIVALPROEX SODIUM SCH MG: 500 TABLET, DELAYED RELEASE ORAL at 18:01

## 2017-08-26 NOTE — [email or web address].PN
Subjective





- Date & Time of Evaluation


Date of Evaluation: 08/26/17


Time of Evaluation: 14:48





- Subjective


Subjective: 





s/o no f/c, n/v/d. no other complaints. calmer


a/p cont plan, cont placement, klonpin prn, cont seroquel





Objective





- Vital Signs/Intake and Output


Vital Signs (last 24 hours): 


 











Temp Pulse Resp BP Pulse Ox


 


 97.6 F   55 L  18   100/56 L  96 


 


 08/26/17 08:46  08/26/17 08:46  08/26/17 08:46  08/26/17 08:46  08/26/17 08:46











- Medications


Medications: 


 Current Medications





Atorvastatin Calcium (Lipitor)  20 mg PO HS [facility]


   Last Admin: 08/25/17 21:12 Dose:  20 mg


Clonazepam (Klonopin)  0.5 mg PO Q12 PRN


   PRN Reason: Agitation


   Last Admin: 08/16/17 09:28 Dose:  0.5 mg


Divalproex Sodium (Depakote Dr(*Bid*))  500 mg PO BID [facility]


   Last Admin: 08/26/17 09:38 Dose:  500 mg


Enalapril Maleate (Vasotec)  10 mg PO DAILY [facility]


   Last Admin: 08/26/17 09:38 Dose:  10 mg


Fenofibrate (Tricor)  145 mg PO DAILY [facility]


   Last Admin: 08/26/17 09:38 Dose:  145 mg


Metoprolol Tartrate (Lopressor)  50 mg PO Q12 [facility]


   Last Admin: 08/26/17 09:39 Dose:  Not Given


Quetiapine Fumarate (Seroquel)  25 mg PO [facility]


   Last Admin: 08/25/17 21:12 Dose:  25 mg











- Labs


Labs: 


 





 07/13/17 04:20 





 07/13/17 04:20 





 











PT  11.2 SECONDS (9.4-12.0)   12/14/15  05:20    


 


INR  1.05  (0.89-1.20)   12/14/15  05:20    


 


APTT  36.2 SECONDS (23.1-32.0)  H  12/14/15  05:20    














- Constitutional


Appears: Well, Non-toxic, No Acute Distress





- Head Exam


Head Exam: ATRAUMATIC, NORMAL INSPECTION, NORMOCEPHALIC





- Eye Exam


Eye Exam: EOMI, Normal appearance, PERRL


Pupil Exam: NORMAL ACCOMODATION, PERRL





- ENT Exam


ENT Exam: Mucous Membranes Moist, Normal Exam





- Neck Exam


Neck Exam: Full ROM, Normal Inspection.  absent: Lymphadenopathy





- Respiratory Exam


Respiratory Exam: Clear to Ausculation Bilateral, NORMAL BREATHING PATTERN





- Cardiovascular Exam


Cardiovascular Exam: REGULAR RHYTHM, RRR, +S1, +S2.  absent: Murmur





- GI/Abdominal Exam


GI & Abdominal Exam: Soft, Normal Bowel Sounds.  absent: Tenderness





- Extremities Exam


Extremities Exam: Full ROM, Normal Capillary Refill, Normal Inspection.  absent

: Joint Swelling, Pedal Edema





- Back Exam


Back Exam: NORMAL INSPECTION





- Neurological Exam


Neurological Exam: Alert, Awake, CN II-XII Intact, Normal Gait, Oriented x3





- Psychiatric Exam


Psychiatric exam: Normal Affect, Normal Mood





- Skin


Skin Exam: Dry, Intact, Normal Color, Warm





Assessment and Plan


(1) DVT prophylaxis


Status: Chronic   





(2) Scrotal edema


Status: Chronic   





(3) CAD (coronary artery disease)


Status: Chronic   





(4) Smoking


Status: Chronic   





(5) Low back pain


Status: Chronic   





(6) Prediabetes


Status: Chronic   





(7) Neurocognitive disorder


Status: Chronic

## 2017-08-27 RX ADMIN — DIVALPROEX SODIUM SCH MG: 500 TABLET, DELAYED RELEASE ORAL at 17:29

## 2017-08-27 RX ADMIN — DIVALPROEX SODIUM SCH MG: 500 TABLET, DELAYED RELEASE ORAL at 08:57

## 2017-08-27 NOTE — CP.PCM.PN
Subjective





- Date & Time of Evaluation


Date of Evaluation: 08/27/17


Time of Evaluation: 12:24





- Subjective


Subjective: 








s/o no f/c, n/v/d. no other complaints. calmer


a/p cont plan, cont placement, klonpin prn, cont seroquel





Objective





- Vital Signs/Intake and Output


Vital Signs (last 24 hours): 


 











Temp Pulse Resp BP Pulse Ox


 


 98 F   65   18   106/67   100 


 


 08/27/17 08:31  08/27/17 08:57  08/27/17 08:31  08/27/17 08:57  08/27/17 08:31











- Medications


Medications: 


 Current Medications





Atorvastatin Calcium (Lipitor)  20 mg PO [facility]


   Last Admin: 08/26/17 21:07 Dose:  20 mg


Clonazepam (Klonopin)  0.5 mg PO Q12 PRN


   PRN Reason: Agitation


   Last Admin: 08/16/17 09:28 Dose:  0.5 mg


Divalproex Sodium (Depakote Dr(*Bid*))  500 mg PO BID [facility]


   Last Admin: 08/27/17 08:57 Dose:  500 mg


Enalapril Maleate (Vasotec)  10 mg PO DAILY [facility]


   Last Admin: 08/27/17 08:57 Dose:  10 mg


Fenofibrate (Tricor)  145 mg PO DAILY [facility]


   Last Admin: 08/27/17 08:57 Dose:  145 mg


Metoprolol Tartrate (Lopressor)  50 mg PO Q12 [facility]


   Last Admin: 08/27/17 08:57 Dose:  50 mg


Quetiapine Fumarate (Seroquel)  25 mg PO [facility]


   Last Admin: 08/26/17 21:07 Dose:  25 mg











- Labs


Labs: 


 





 07/13/17 04:20 





 07/13/17 04:20 





 











PT  11.2 SECONDS (9.4-12.0)   12/14/15  05:20    


 


INR  1.05  (0.89-1.20)   12/14/15  05:20    


 


APTT  36.2 SECONDS (23.1-32.0)  H  12/14/15  05:20    














- Constitutional


Appears: Well, Non-toxic, No Acute Distress





- Head Exam


Head Exam: ATRAUMATIC, NORMAL INSPECTION, NORMOCEPHALIC





- Eye Exam


Eye Exam: EOMI, Normal appearance, PERRL


Pupil Exam: NORMAL ACCOMODATION, PERRL





- ENT Exam


ENT Exam: Mucous Membranes Moist, Normal Exam





- Neck Exam


Neck Exam: Full ROM, Normal Inspection.  absent: Lymphadenopathy





- Respiratory Exam


Respiratory Exam: Clear to Ausculation Bilateral, NORMAL BREATHING PATTERN





- Cardiovascular Exam


Cardiovascular Exam: REGULAR RHYTHM, RRR, +S1, +S2.  absent: Murmur





- GI/Abdominal Exam


GI & Abdominal Exam: Soft, Normal Bowel Sounds.  absent: Tenderness





- Extremities Exam


Extremities Exam: Full ROM, Normal Capillary Refill, Normal Inspection.  absent

: Joint Swelling, Pedal Edema





- Back Exam


Back Exam: NORMAL INSPECTION





- Neurological Exam


Neurological Exam: Alert, Awake, CN II-XII Intact, Normal Gait, Oriented x3





- Psychiatric Exam


Psychiatric exam: Normal Affect, Normal Mood





- Skin


Skin Exam: Dry, Intact, Normal Color, Warm





Assessment and Plan


(1) DVT prophylaxis


Status: Chronic   





(2) Scrotal edema


Status: Chronic   





(3) CAD (coronary artery disease)


Status: Chronic   





(4) Smoking


Status: Chronic   





(5) Low back pain


Status: Chronic   





(6) Prediabetes


Status: Chronic   





(7) Neurocognitive disorder


Status: Chronic

## 2017-08-28 RX ADMIN — DIVALPROEX SODIUM SCH MG: 500 TABLET, DELAYED RELEASE ORAL at 08:17

## 2017-08-28 RX ADMIN — DIVALPROEX SODIUM SCH MG: 500 TABLET, DELAYED RELEASE ORAL at 16:16

## 2017-08-28 NOTE — CP.PCM.PN
Subjective





- Date & Time of Evaluation


Date of Evaluation: 08/28/17


Time of Evaluation: 08:15





- Subjective


Subjective: 





s/o no f/c, n/v/d. no other complaints. calmer


a/p cont plan, cont placement, klonpin prn, cont seroquel





Objective





- Vital Signs/Intake and Output


Vital Signs (last 24 hours): 


 











Temp Pulse Resp BP Pulse Ox


 


 97.8 F   68   20   106/73   99 


 


 08/28/17 07:26  08/28/17 07:26  08/28/17 07:26  08/28/17 07:26  08/28/17 07:26











- Medications


Medications: 


 Current Medications





Atorvastatin Calcium (Lipitor)  20 mg PO [facility]


   Last Admin: 08/27/17 21:23 Dose:  20 mg


Clonazepam (Klonopin)  0.5 mg PO Q12 PRN


   PRN Reason: Agitation


   Last Admin: 08/16/17 09:28 Dose:  0.5 mg


Divalproex Sodium (Depakote Dr(*Bid*))  500 mg PO BID [facility]


   Last Admin: 08/27/17 17:29 Dose:  500 mg


Enalapril Maleate (Vasotec)  10 mg PO DAILY [facility]


   Last Admin: 08/27/17 08:57 Dose:  10 mg


Fenofibrate (Tricor)  145 mg PO DAILY [facility]


   Last Admin: 08/27/17 08:57 Dose:  145 mg


Metoprolol Tartrate (Lopressor)  50 mg PO Q12 [facility]


   Last Admin: 08/27/17 21:20 Dose:  50 mg


Quetiapine Fumarate (Seroquel)  25 mg PO [facility]


   Last Admin: 08/27/17 21:23 Dose:  25 mg











- Labs


Labs: 


 





 07/13/17 04:20 





 07/13/17 04:20 





 











PT  11.2 SECONDS (9.4-12.0)   12/14/15  05:20    


 


INR  1.05  (0.89-1.20)   12/14/15  05:20    


 


APTT  36.2 SECONDS (23.1-32.0)  H  12/14/15  05:20    














- Constitutional


Appears: Well, Non-toxic, No Acute Distress





- Head Exam


Head Exam: ATRAUMATIC, NORMAL INSPECTION, NORMOCEPHALIC





- Eye Exam


Eye Exam: EOMI, Normal appearance, PERRL


Pupil Exam: NORMAL ACCOMODATION, PERRL





- ENT Exam


ENT Exam: Mucous Membranes Moist, Normal Exam





- Neck Exam


Neck Exam: Full ROM, Normal Inspection.  absent: Lymphadenopathy





- Respiratory Exam


Respiratory Exam: Clear to Ausculation Bilateral, NORMAL BREATHING PATTERN





- Cardiovascular Exam


Cardiovascular Exam: REGULAR RHYTHM, RRR, +S1, +S2.  absent: Murmur





- GI/Abdominal Exam


GI & Abdominal Exam: Soft, Normal Bowel Sounds.  absent: Tenderness





- Extremities Exam


Extremities Exam: Full ROM, Normal Capillary Refill, Normal Inspection.  absent

: Joint Swelling, Pedal Edema





- Back Exam


Back Exam: NORMAL INSPECTION





- Neurological Exam


Neurological Exam: Alert, Awake, CN II-XII Intact, Normal Gait, Oriented x3





- Psychiatric Exam


Psychiatric exam: Normal Affect, Normal Mood





- Skin


Skin Exam: Dry, Intact, Normal Color, Warm





Assessment and Plan


(1) DVT prophylaxis


Status: Chronic   





(2) Scrotal edema


Status: Chronic   





(3) CAD (coronary artery disease)


Status: Chronic   





(4) Smoking


Status: Chronic   





(5) Low back pain


Status: Chronic   





(6) Prediabetes


Status: Chronic   





(7) Neurocognitive disorder


Status: Chronic

## 2017-08-29 RX ADMIN — DIVALPROEX SODIUM SCH MG: 500 TABLET, DELAYED RELEASE ORAL at 08:28

## 2017-08-29 RX ADMIN — DIVALPROEX SODIUM SCH MG: 500 TABLET, DELAYED RELEASE ORAL at 18:05

## 2017-08-29 NOTE — CP.PCM.PN
Subjective





- Date & Time of Evaluation


Date of Evaluation: 08/29/17


Time of Evaluation: 07:11





- Subjective


Subjective: 





s/o no f/c, n/v/d. no other complaints. calmer


a/p cont plan, cont placement, klonpin prn, cont seroquel





Objective





- Vital Signs/Intake and Output


Vital Signs (last 24 hours): 


 











Temp Pulse Resp BP Pulse Ox


 


 97.7 F   73   20   102/67   98 


 


 08/29/17 01:20  08/29/17 01:20  08/29/17 01:20  08/29/17 01:20  08/29/17 01:20











- Medications


Medications: 


 Current Medications





Atorvastatin Calcium (Lipitor)  20 mg PO [facility]


   Last Admin: 08/28/17 21:15 Dose:  20 mg


Clonazepam (Klonopin)  0.5 mg PO Q12 PRN


   PRN Reason: Agitation


   Last Admin: 08/16/17 09:28 Dose:  0.5 mg


Divalproex Sodium (Depakote Dr(*Bid*))  500 mg PO BID [facility]


   Last Admin: 08/28/17 16:16 Dose:  500 mg


Enalapril Maleate (Vasotec)  10 mg PO DAILY [facility]


   Last Admin: 08/28/17 08:17 Dose:  10 mg


Fenofibrate (Tricor)  145 mg PO DAILY [facility]


   Last Admin: 08/28/17 08:18 Dose:  145 mg


Metoprolol Tartrate (Lopressor)  50 mg PO Q12 [facility]


   Last Admin: 08/28/17 21:15 Dose:  50 mg


Quetiapine Fumarate (Seroquel)  25 mg PO [facility]


   Last Admin: 08/28/17 21:15 Dose:  25 mg











- Labs


Labs: 


 





 07/13/17 04:20 





 07/13/17 04:20 





 











PT  11.2 SECONDS (9.4-12.0)   12/14/15  05:20    


 


INR  1.05  (0.89-1.20)   12/14/15  05:20    


 


APTT  36.2 SECONDS (23.1-32.0)  H  12/14/15  05:20    














- Constitutional


Appears: Well, Non-toxic, No Acute Distress





- Head Exam


Head Exam: ATRAUMATIC, NORMAL INSPECTION, NORMOCEPHALIC





- Eye Exam


Eye Exam: EOMI, Normal appearance, PERRL


Pupil Exam: NORMAL ACCOMODATION, PERRL





- ENT Exam


ENT Exam: Mucous Membranes Moist, Normal Exam





- Neck Exam


Neck Exam: Full ROM, Normal Inspection.  absent: Lymphadenopathy





- Respiratory Exam


Respiratory Exam: Clear to Ausculation Bilateral, NORMAL BREATHING PATTERN





- Cardiovascular Exam


Cardiovascular Exam: REGULAR RHYTHM, RRR, +S1, +S2.  absent: Murmur





- GI/Abdominal Exam


GI & Abdominal Exam: Soft, Normal Bowel Sounds.  absent: Tenderness





- Extremities Exam


Extremities Exam: Full ROM, Normal Capillary Refill, Normal Inspection.  absent

: Joint Swelling, Pedal Edema





- Back Exam


Back Exam: NORMAL INSPECTION





- Neurological Exam


Neurological Exam: Alert, Awake, CN II-XII Intact, Normal Gait





- Psychiatric Exam


Psychiatric exam: Normal Affect, Normal Mood





- Skin


Skin Exam: Dry, Intact, Normal Color, Warm





Assessment and Plan


(1) DVT prophylaxis


Status: Chronic   





(2) Scrotal edema


Status: Chronic   





(3) CAD (coronary artery disease)


Status: Chronic   





(4) Smoking


Status: Chronic   





(5) Low back pain


Status: Chronic   





(6) Prediabetes


Status: Chronic   





(7) Neurocognitive disorder


Status: Chronic

## 2017-08-30 RX ADMIN — DIVALPROEX SODIUM SCH MG: 500 TABLET, DELAYED RELEASE ORAL at 15:59

## 2017-08-30 RX ADMIN — DIVALPROEX SODIUM SCH MG: 500 TABLET, DELAYED RELEASE ORAL at 08:21

## 2017-08-30 NOTE — CP.PCM.PN
Subjective





- Date & Time of Evaluation


Date of Evaluation: 08/30/17


Time of Evaluation: 08:29





- Subjective


Subjective: 





s/o no f/c, n/v/d. no other complaints. calmer


a/p cont plan, cont placement, klonpin prn, cont seroquel





Objective





- Vital Signs/Intake and Output


Vital Signs (last 24 hours): 


 











Temp Pulse Resp BP Pulse Ox


 


 97.5 F L  63   18   110/74   99 


 


 08/30/17 07:38  08/30/17 07:38  08/30/17 07:38  08/30/17 07:38  08/30/17 07:38











- Medications


Medications: 


 Current Medications





Atorvastatin Calcium (Lipitor)  20 mg PO [facility]


   Last Admin: 08/29/17 21:02 Dose:  20 mg


Clonazepam (Klonopin)  0.5 mg PO Q12 PRN


   PRN Reason: Agitation


   Last Admin: 08/16/17 09:28 Dose:  0.5 mg


Divalproex Sodium (Depakote Dr(*Bid*))  500 mg PO BID [facility]


   Last Admin: 08/30/17 08:21 Dose:  500 mg


Enalapril Maleate (Vasotec)  10 mg PO DAILY [facility]


   Last Admin: 08/30/17 08:22 Dose:  10 mg


Fenofibrate (Tricor)  145 mg PO DAILY [facility]


   Last Admin: 08/30/17 08:22 Dose:  145 mg


Metoprolol Tartrate (Lopressor)  50 mg PO Q12 [facility]


   Last Admin: 08/30/17 08:22 Dose:  50 mg


Quetiapine Fumarate (Seroquel)  25 mg PO [facility]


   Last Admin: 08/29/17 21:02 Dose:  25 mg











- Labs


Labs: 


 





 07/13/17 04:20 





 07/13/17 04:20 





 











PT  11.2 SECONDS (9.4-12.0)   12/14/15  05:20    


 


INR  1.05  (0.89-1.20)   12/14/15  05:20    


 


APTT  36.2 SECONDS (23.1-32.0)  H  12/14/15  05:20    














- Constitutional


Appears: Well, Non-toxic, No Acute Distress





- Head Exam


Head Exam: ATRAUMATIC, NORMAL INSPECTION, NORMOCEPHALIC





- Eye Exam


Eye Exam: EOMI, Normal appearance, PERRL


Pupil Exam: NORMAL ACCOMODATION, PERRL





- ENT Exam


ENT Exam: Mucous Membranes Moist, Normal Exam





- Neck Exam


Neck Exam: Full ROM, Normal Inspection.  absent: Lymphadenopathy





- Respiratory Exam


Respiratory Exam: Clear to Ausculation Bilateral, NORMAL BREATHING PATTERN





- Cardiovascular Exam


Cardiovascular Exam: REGULAR RHYTHM, RRR, +S1, +S2.  absent: Murmur





- GI/Abdominal Exam


GI & Abdominal Exam: Soft, Normal Bowel Sounds.  absent: Tenderness





- Extremities Exam


Extremities Exam: Full ROM, Normal Capillary Refill, Normal Inspection.  absent

: Joint Swelling, Pedal Edema





- Back Exam


Back Exam: NORMAL INSPECTION





- Neurological Exam


Neurological Exam: Alert, Awake, CN II-XII Intact, Normal Gait, Oriented x3





- Psychiatric Exam


Psychiatric exam: Normal Affect, Normal Mood





- Skin


Skin Exam: Dry, Intact, Normal Color, Warm





Assessment and Plan


(1) DVT prophylaxis


Status: Chronic   





(2) Scrotal edema


Status: Chronic   





(3) CAD (coronary artery disease)


Status: Chronic   





(4) Smoking


Status: Chronic   





(5) Low back pain


Status: Chronic   





(6) Prediabetes


Status: Chronic   





(7) Neurocognitive disorder


Status: Chronic

## 2017-08-31 RX ADMIN — DIVALPROEX SODIUM SCH MG: 500 TABLET, DELAYED RELEASE ORAL at 08:23

## 2017-08-31 RX ADMIN — DIVALPROEX SODIUM SCH MG: 500 TABLET, DELAYED RELEASE ORAL at 16:02

## 2017-08-31 NOTE — CP.PCM.PN
Subjective





- Date & Time of Evaluation


Date of Evaluation: 08/31/17


Time of Evaluation: 07:29





- Subjective


Subjective: 





s/o no f/c, n/v/d. no other complaints. calmer


a/p cont plan, cont placement, klonpin prn, cont seroquel





Objective





- Vital Signs/Intake and Output


Vital Signs (last 24 hours): 


 











Temp Pulse Resp BP Pulse Ox


 


 97.9 F   71   19   108/70   96 


 


 08/31/17 00:34  08/31/17 00:34  08/31/17 00:34  08/31/17 00:34  08/31/17 00:34











- Medications


Medications: 


 Current Medications





Atorvastatin Calcium (Lipitor)  20 mg PO [facility]


   Last Admin: 08/30/17 21:59 Dose:  20 mg


Clonazepam (Klonopin)  0.5 mg PO Q12 PRN


   PRN Reason: Agitation


   Last Admin: 08/16/17 09:28 Dose:  0.5 mg


Divalproex Sodium (Depakote Dr(*Bid*))  500 mg PO BID [facility]


   Last Admin: 08/30/17 15:59 Dose:  500 mg


Enalapril Maleate (Vasotec)  10 mg PO DAILY [facility]


   Last Admin: 08/30/17 08:22 Dose:  10 mg


Fenofibrate (Tricor)  145 mg PO DAILY [facility]


   Last Admin: 08/30/17 08:22 Dose:  145 mg


Metoprolol Tartrate (Lopressor)  50 mg PO Q12 [facility]


   Last Admin: 08/30/17 21:59 Dose:  50 mg


Quetiapine Fumarate (Seroquel)  25 mg PO [facility]


   Last Admin: 08/30/17 21:59 Dose:  25 mg











- Labs


Labs: 


 





 07/13/17 04:20 





 07/13/17 04:20 





 











PT  11.2 SECONDS (9.4-12.0)   12/14/15  05:20    


 


INR  1.05  (0.89-1.20)   12/14/15  05:20    


 


APTT  36.2 SECONDS (23.1-32.0)  H  12/14/15  05:20    














- Constitutional


Appears: Well, Non-toxic, No Acute Distress





- Head Exam


Head Exam: ATRAUMATIC, NORMAL INSPECTION, NORMOCEPHALIC





- Eye Exam


Eye Exam: EOMI, Normal appearance, PERRL


Pupil Exam: NORMAL ACCOMODATION, PERRL





- ENT Exam


ENT Exam: Mucous Membranes Moist, Normal Exam





- Neck Exam


Neck Exam: Full ROM, Normal Inspection.  absent: Lymphadenopathy





- Respiratory Exam


Respiratory Exam: Clear to Ausculation Bilateral, NORMAL BREATHING PATTERN





- Cardiovascular Exam


Cardiovascular Exam: REGULAR RHYTHM, RRR, +S1, +S2.  absent: Murmur





- GI/Abdominal Exam


GI & Abdominal Exam: Soft, Normal Bowel Sounds.  absent: Tenderness





- Extremities Exam


Extremities Exam: Full ROM, Normal Capillary Refill, Normal Inspection.  absent

: Joint Swelling, Pedal Edema





- Back Exam


Back Exam: NORMAL INSPECTION





- Neurological Exam


Neurological Exam: Alert, Awake, CN II-XII Intact, Normal Gait, Oriented x3





- Psychiatric Exam


Psychiatric exam: Normal Affect, Normal Mood





- Skin


Skin Exam: Dry, Intact, Normal Color, Warm





Assessment and Plan


(1) DVT prophylaxis


Status: Chronic   





(2) Scrotal edema


Status: Chronic   





(3) CAD (coronary artery disease)


Status: Chronic   





(4) Smoking


Status: Chronic   





(5) Low back pain


Status: Chronic   





(6) Prediabetes


Status: Chronic   





(7) Neurocognitive disorder


Status: Chronic

## 2017-09-01 RX ADMIN — DIVALPROEX SODIUM SCH MG: 500 TABLET, DELAYED RELEASE ORAL at 09:29

## 2017-09-01 RX ADMIN — DIVALPROEX SODIUM SCH MG: 500 TABLET, DELAYED RELEASE ORAL at 16:41

## 2017-09-01 NOTE — CP.PCM.PN
Subjective





- Date & Time of Evaluation


Date of Evaluation: 09/01/17


Time of Evaluation: 10:17





- Subjective


Subjective: 





s/o no f/c, n/v/d. no other complaints. calmer


a/p cont plan, cont placement, klonpin prn, cont seroquel








Objective





- Vital Signs/Intake and Output


Vital Signs (last 24 hours): 


 











Temp Pulse Resp BP Pulse Ox


 


 97.1 F L  57 L  20   97/63 L  98 


 


 09/01/17 08:10  09/01/17 09:30  09/01/17 08:10  09/01/17 09:30  09/01/17 08:10











- Medications


Medications: 


 Current Medications





Atorvastatin Calcium (Lipitor)  20 mg PO HS [facility]


   Last Admin: 08/31/17 21:11 Dose:  20 mg


Clonazepam (Klonopin)  0.5 mg PO Q12 PRN


   PRN Reason: Agitation


   Last Admin: 08/16/17 09:28 Dose:  0.5 mg


Divalproex Sodium (Depakote Dr(*Bid*))  500 mg PO BID [facility]


   Last Admin: 09/01/17 09:29 Dose:  500 mg


Enalapril Maleate (Vasotec)  10 mg PO DAILY [facility]


   Last Admin: 09/01/17 09:30 Dose:  10 mg


Fenofibrate (Tricor)  145 mg PO DAILY [facility]


   Last Admin: 09/01/17 09:30 Dose:  145 mg


Metoprolol Tartrate (Lopressor)  50 mg PO Q12 [facility]


   Last Admin: 09/01/17 09:30 Dose:  Not Given


Quetiapine Fumarate (Seroquel)  25 mg PO [facility]


   Last Admin: 08/31/17 21:11 Dose:  25 mg











- Labs


Labs: 


 





 07/13/17 04:20 





 07/13/17 04:20 





 











PT  11.2 SECONDS (9.4-12.0)   12/14/15  05:20    


 


INR  1.05  (0.89-1.20)   12/14/15  05:20    


 


APTT  36.2 SECONDS (23.1-32.0)  H  12/14/15  05:20    














- Constitutional


Appears: Well, Non-toxic, No Acute Distress





- Head Exam


Head Exam: ATRAUMATIC, NORMAL INSPECTION, NORMOCEPHALIC





- Eye Exam


Eye Exam: EOMI, Normal appearance, PERRL


Pupil Exam: NORMAL ACCOMODATION, PERRL





- ENT Exam


ENT Exam: Mucous Membranes Moist, Normal Exam





- Neck Exam


Neck Exam: Full ROM, Normal Inspection.  absent: Lymphadenopathy





- Respiratory Exam


Respiratory Exam: Clear to Ausculation Bilateral, NORMAL BREATHING PATTERN





- Cardiovascular Exam


Cardiovascular Exam: REGULAR RHYTHM, RRR, +S1, +S2.  absent: Murmur





- GI/Abdominal Exam


GI & Abdominal Exam: Soft, Normal Bowel Sounds.  absent: Tenderness





- Extremities Exam


Extremities Exam: Full ROM, Normal Capillary Refill, Normal Inspection.  absent

: Joint Swelling, Pedal Edema





- Back Exam


Back Exam: NORMAL INSPECTION





- Neurological Exam


Neurological Exam: Alert, Awake, CN II-XII Intact, Normal Gait, Oriented x3





- Psychiatric Exam


Psychiatric exam: Normal Affect, Normal Mood





- Skin


Skin Exam: Dry, Intact, Normal Color, Warm





Assessment and Plan


(1) DVT prophylaxis


Status: Chronic   





(2) Scrotal edema


Status: Chronic   





(3) CAD (coronary artery disease)


Status: Chronic   





(4) Smoking


Status: Chronic   





(5) Low back pain


Status: Chronic   





(6) Prediabetes


Status: Chronic   





(7) Neurocognitive disorder


Status: Chronic

## 2017-09-02 RX ADMIN — DIVALPROEX SODIUM SCH MG: 500 TABLET, DELAYED RELEASE ORAL at 16:44

## 2017-09-02 RX ADMIN — DIVALPROEX SODIUM SCH MG: 500 TABLET, DELAYED RELEASE ORAL at 09:30

## 2017-09-02 NOTE — CP.PCM.PN
Subjective





- Date & Time of Evaluation


Date of Evaluation: 09/02/17


Time of Evaluation: 08:21





- Subjective


Subjective: 





s/o no f/c, n/v/d. no other complaints. calmer


a/p cont plan, cont placement, klonpin prn, cont seroquel





Objective





- Vital Signs/Intake and Output


Vital Signs (last 24 hours): 


 











Temp Pulse Resp BP Pulse Ox


 


 97.4 F L  63   20   98/66 L  99 


 


 09/02/17 07:40  09/02/17 07:40  09/02/17 07:40  09/02/17 07:40  09/02/17 07:40











- Medications


Medications: 


 Current Medications





Atorvastatin Calcium (Lipitor)  20 mg PO HS [facility]


   Last Admin: 09/01/17 21:09 Dose:  20 mg


Clonazepam (Klonopin)  0.5 mg PO Q12 PRN


   PRN Reason: Agitation


   Last Admin: 08/16/17 09:28 Dose:  0.5 mg


Divalproex Sodium (Depakote Dr(*Bid*))  500 mg PO BID [facility]


   Last Admin: 09/01/17 16:41 Dose:  500 mg


Enalapril Maleate (Vasotec)  10 mg PO DAILY [facility]


   Last Admin: 09/01/17 09:30 Dose:  10 mg


Fenofibrate (Tricor)  145 mg PO DAILY [facility]


   Last Admin: 09/01/17 09:30 Dose:  145 mg


Metoprolol Tartrate (Lopressor)  50 mg PO Q12 [facility]


   Last Admin: 09/01/17 21:08 Dose:  Not Given


Quetiapine Fumarate (Seroquel)  25 mg PO [facility]


   Last Admin: 09/01/17 21:09 Dose:  25 mg











- Labs


Labs: 


 





 07/13/17 04:20 





 07/13/17 04:20 





 











PT  11.2 SECONDS (9.4-12.0)   12/14/15  05:20    


 


INR  1.05  (0.89-1.20)   12/14/15  05:20    


 


APTT  36.2 SECONDS (23.1-32.0)  H  12/14/15  05:20    














- Constitutional


Appears: Well, Non-toxic, No Acute Distress





- Head Exam


Head Exam: ATRAUMATIC, NORMAL INSPECTION, NORMOCEPHALIC





- Eye Exam


Eye Exam: EOMI, Normal appearance, PERRL


Pupil Exam: NORMAL ACCOMODATION, PERRL





- ENT Exam


ENT Exam: Mucous Membranes Moist, Normal Exam





- Neck Exam


Neck Exam: Full ROM, Normal Inspection.  absent: Lymphadenopathy





- Respiratory Exam


Respiratory Exam: Clear to Ausculation Bilateral, NORMAL BREATHING PATTERN





- Cardiovascular Exam


Cardiovascular Exam: REGULAR RHYTHM, +S1, +S2.  absent: Murmur





- GI/Abdominal Exam


GI & Abdominal Exam: Soft, Normal Bowel Sounds.  absent: Tenderness





- Extremities Exam


Extremities Exam: Full ROM, Normal Capillary Refill, Normal Inspection.  absent

: Joint Swelling, Pedal Edema





- Back Exam


Back Exam: NORMAL INSPECTION





- Neurological Exam


Neurological Exam: Alert, Awake, CN II-XII Intact, Normal Gait, Oriented x3





- Psychiatric Exam


Psychiatric exam: Normal Affect, Normal Mood





- Skin


Skin Exam: Dry, Intact, Normal Color, Warm





Assessment and Plan


(1) DVT prophylaxis


Status: Chronic   





(2) Scrotal edema


Status: Chronic   





(3) CAD (coronary artery disease)


Status: Chronic   





(4) Smoking


Status: Chronic   





(5) Low back pain


Status: Chronic   





(6) Prediabetes


Status: Chronic   





(7) Neurocognitive disorder


Status: Chronic

## 2017-09-03 RX ADMIN — DIVALPROEX SODIUM SCH MG: 500 TABLET, DELAYED RELEASE ORAL at 16:28

## 2017-09-03 RX ADMIN — DIVALPROEX SODIUM SCH MG: 500 TABLET, DELAYED RELEASE ORAL at 09:02

## 2017-09-03 NOTE — CP.PCM.PN
Subjective





- Date & Time of Evaluation


Date of Evaluation: 09/03/17


Time of Evaluation: 10:52





- Subjective


Subjective: 





s/o no f/c, n/v/d. no other complaints. calmer


a/p cont plan, cont placement, klonpin prn, cont seroquel





Objective





- Vital Signs/Intake and Output


Vital Signs (last 24 hours): 


 











Temp Pulse Resp BP Pulse Ox


 


 97.3 F L  63   20   102/69   98 


 


 09/03/17 07:36  09/03/17 07:36  09/03/17 07:36  09/03/17 07:36  09/03/17 07:36











- Medications


Medications: 


 Current Medications





Atorvastatin Calcium (Lipitor)  20 mg PO HS [facility]


   Last Admin: 09/02/17 21:13 Dose:  20 mg


Clonazepam (Klonopin)  0.5 mg PO Q12 PRN


   PRN Reason: Agitation


   Last Admin: 08/16/17 09:28 Dose:  0.5 mg


Divalproex Sodium (Depakote Dr(*Bid*))  500 mg PO BID [facility]


   Last Admin: 09/03/17 09:02 Dose:  500 mg


Enalapril Maleate (Vasotec)  10 mg PO DAILY [facility]


   Last Admin: 09/03/17 09:03 Dose:  10 mg


Fenofibrate (Tricor)  145 mg PO DAILY [facility]


   Last Admin: 09/03/17 09:03 Dose:  145 mg


Metoprolol Tartrate (Lopressor)  50 mg PO Q12 [facility]


   Last Admin: 09/03/17 09:03 Dose:  50 mg


Quetiapine Fumarate (Seroquel)  25 mg PO [facility]


   Last Admin: 09/02/17 21:14 Dose:  25 mg











- Labs


Labs: 


 





 07/13/17 04:20 





 07/13/17 04:20 





 











PT  11.2 SECONDS (9.4-12.0)   12/14/15  05:20    


 


INR  1.05  (0.89-1.20)   12/14/15  05:20    


 


APTT  36.2 SECONDS (23.1-32.0)  H  12/14/15  05:20    














- Constitutional


Appears: Well, Non-toxic, No Acute Distress





- Head Exam


Head Exam: ATRAUMATIC, NORMAL INSPECTION, NORMOCEPHALIC





- Eye Exam


Eye Exam: EOMI, Normal appearance, PERRL


Pupil Exam: NORMAL ACCOMODATION, PERRL





- ENT Exam


ENT Exam: Mucous Membranes Moist, Normal Exam





- Neck Exam


Neck Exam: Full ROM, Normal Inspection.  absent: Lymphadenopathy





- Respiratory Exam


Respiratory Exam: Clear to Ausculation Bilateral, NORMAL BREATHING PATTERN





- Cardiovascular Exam


Cardiovascular Exam: REGULAR RHYTHM, RRR, +S1, +S2.  absent: Murmur





- GI/Abdominal Exam


GI & Abdominal Exam: Soft, Normal Bowel Sounds.  absent: Tenderness





- Extremities Exam


Extremities Exam: Full ROM, Normal Capillary Refill, Normal Inspection.  absent

: Joint Swelling, Pedal Edema





- Back Exam


Back Exam: NORMAL INSPECTION





- Neurological Exam


Neurological Exam: Alert, Awake, CN II-XII Intact, Normal Gait, Oriented x3





- Psychiatric Exam


Psychiatric exam: Normal Affect, Normal Mood





- Skin


Skin Exam: Dry, Intact, Normal Color, Warm





Assessment and Plan


(1) DVT prophylaxis


Status: Chronic   





(2) Scrotal edema


Status: Chronic   





(3) CAD (coronary artery disease)


Status: Chronic   





(4) Smoking


Status: Chronic   





(5) Low back pain


Status: Chronic   





(6) Prediabetes


Status: Chronic   





(7) Neurocognitive disorder


Status: Chronic

## 2017-09-04 RX ADMIN — DIVALPROEX SODIUM SCH MG: 500 TABLET, DELAYED RELEASE ORAL at 17:31

## 2017-09-04 RX ADMIN — DIVALPROEX SODIUM SCH MG: 500 TABLET, DELAYED RELEASE ORAL at 08:40

## 2017-09-04 NOTE — CP.PCM.PN
Subjective





- Date & Time of Evaluation


Date of Evaluation: 09/04/17


Time of Evaluation: 10:59





- Subjective


Subjective: 





s/o no f/c, n/v/d. no other complaints. calmer


a/p cont plan, cont placement, klonpin prn, cont seroquel





Objective





- Vital Signs/Intake and Output


Vital Signs (last 24 hours): 


 











Temp Pulse Resp BP Pulse Ox


 


 98 F   58 L  20   118/74   100 


 


 09/04/17 07:46  09/04/17 07:46  09/04/17 07:46  09/04/17 07:46  09/04/17 07:46











- Medications


Medications: 


 Current Medications





Atorvastatin Calcium (Lipitor)  20 mg PO [facility]


   Last Admin: 09/03/17 21:53 Dose:  20 mg


Clonazepam (Klonopin)  0.5 mg PO Q12 PRN


   PRN Reason: Agitation


   Last Admin: 08/16/17 09:28 Dose:  0.5 mg


Divalproex Sodium (Depakote Dr(*Bid*))  500 mg PO BID [facility]


   Last Admin: 09/04/17 08:40 Dose:  500 mg


Enalapril Maleate (Vasotec)  10 mg PO DAILY [facility]


   Last Admin: 09/04/17 08:40 Dose:  10 mg


Fenofibrate (Tricor)  145 mg PO DAILY [facility]


   Last Admin: 09/04/17 08:40 Dose:  145 mg


Metoprolol Tartrate (Lopressor)  50 mg PO Q12 [facility]


   Last Admin: 09/04/17 08:40 Dose:  50 mg


Quetiapine Fumarate (Seroquel)  25 mg PO [facility]


   Last Admin: 09/03/17 21:53 Dose:  25 mg











- Labs


Labs: 


 





 07/13/17 04:20 





 07/13/17 04:20 





 











PT  11.2 SECONDS (9.4-12.0)   12/14/15  05:20    


 


INR  1.05  (0.89-1.20)   12/14/15  05:20    


 


APTT  36.2 SECONDS (23.1-32.0)  H  12/14/15  05:20    














- Constitutional


Appears: Well, Non-toxic, No Acute Distress





- Head Exam


Head Exam: ATRAUMATIC, NORMAL INSPECTION, NORMOCEPHALIC





- Eye Exam


Eye Exam: EOMI, Normal appearance, PERRL


Pupil Exam: NORMAL ACCOMODATION, PERRL





- ENT Exam


ENT Exam: Mucous Membranes Moist, Normal Exam





- Neck Exam


Neck Exam: Full ROM, Normal Inspection.  absent: Lymphadenopathy





- Respiratory Exam


Respiratory Exam: Clear to Ausculation Bilateral, NORMAL BREATHING PATTERN





- Cardiovascular Exam


Cardiovascular Exam: REGULAR RHYTHM, RRR, +S1, +S2.  absent: Murmur





- GI/Abdominal Exam


GI & Abdominal Exam: Soft, Normal Bowel Sounds.  absent: Tenderness





- Extremities Exam


Extremities Exam: Full ROM, Normal Capillary Refill, Normal Inspection.  absent

: Joint Swelling, Pedal Edema





- Back Exam


Back Exam: NORMAL INSPECTION





- Neurological Exam


Neurological Exam: Alert, Awake, CN II-XII Intact, Normal Gait, Oriented x3





- Psychiatric Exam


Psychiatric exam: Normal Affect, Normal Mood





- Skin


Skin Exam: Dry, Intact, Normal Color, Warm





Assessment and Plan


(1) DVT prophylaxis


Status: Chronic   





(2) Scrotal edema


Status: Chronic   





(3) CAD (coronary artery disease)


Status: Chronic   





(4) Smoking


Status: Chronic   





(5) Low back pain


Status: Chronic   





(6) Prediabetes


Status: Chronic   





(7) Neurocognitive disorder


Status: Chronic

## 2017-09-05 RX ADMIN — DIVALPROEX SODIUM SCH MG: 500 TABLET, DELAYED RELEASE ORAL at 08:17

## 2017-09-05 RX ADMIN — DIVALPROEX SODIUM SCH MG: 500 TABLET, DELAYED RELEASE ORAL at 16:11

## 2017-09-05 NOTE — CP.PCM.PN
Subjective





- Date & Time of Evaluation


Date of Evaluation: 09/05/17


Time of Evaluation: 08:06





- Subjective


Subjective: 





s/o no f/c, n/v/d. no other complaints. calmer


a/p cont plan, cont placement, klonpin prn, cont seroquel





Objective





- Vital Signs/Intake and Output


Vital Signs (last 24 hours): 


 











Temp Pulse Resp BP Pulse Ox


 


 98.4 F   67   18   93/61 L  98 


 


 09/05/17 00:43  09/05/17 00:43  09/05/17 00:43  09/05/17 00:43  09/05/17 00:43











- Medications


Medications: 


 Current Medications





Atorvastatin Calcium (Lipitor)  20 mg PO [facility]


   Last Admin: 09/04/17 21:28 Dose:  20 mg


Clonazepam (Klonopin)  0.5 mg PO Q12 PRN


   PRN Reason: Agitation


   Last Admin: 08/16/17 09:28 Dose:  0.5 mg


Divalproex Sodium (Depakote Dr(*Bid*))  500 mg PO BID [facility]


   Last Admin: 09/04/17 17:31 Dose:  500 mg


Enalapril Maleate (Vasotec)  10 mg PO DAILY [facility]


   Last Admin: 09/04/17 08:40 Dose:  10 mg


Fenofibrate (Tricor)  145 mg PO DAILY [facility]


   Last Admin: 09/04/17 08:40 Dose:  145 mg


Metoprolol Tartrate (Lopressor)  50 mg PO Q12 [facility]


   Last Admin: 09/04/17 21:27 Dose:  50 mg


Quetiapine Fumarate (Seroquel)  25 mg PO [facility]


   Last Admin: 09/04/17 21:28 Dose:  25 mg











- Labs


Labs: 


 





 07/13/17 04:20 





 07/13/17 04:20 





 











PT  11.2 SECONDS (9.4-12.0)   12/14/15  05:20    


 


INR  1.05  (0.89-1.20)   12/14/15  05:20    


 


APTT  36.2 SECONDS (23.1-32.0)  H  12/14/15  05:20    














- Constitutional


Appears: Well, Non-toxic, No Acute Distress





- Head Exam


Head Exam: ATRAUMATIC, NORMAL INSPECTION, NORMOCEPHALIC





- Eye Exam


Eye Exam: EOMI, Normal appearance, PERRL


Pupil Exam: NORMAL ACCOMODATION, PERRL





- ENT Exam


ENT Exam: Mucous Membranes Moist, Normal Exam





- Neck Exam


Neck Exam: Full ROM, Normal Inspection.  absent: Lymphadenopathy





- Respiratory Exam


Respiratory Exam: Clear to Ausculation Bilateral, NORMAL BREATHING PATTERN





- Cardiovascular Exam


Cardiovascular Exam: REGULAR RHYTHM, RRR, +S1, +S2.  absent: Murmur





- GI/Abdominal Exam


GI & Abdominal Exam: Soft, Normal Bowel Sounds.  absent: Tenderness





- Extremities Exam


Extremities Exam: Full ROM, Normal Capillary Refill, Normal Inspection.  absent

: Joint Swelling, Pedal Edema





- Back Exam


Back Exam: NORMAL INSPECTION





- Neurological Exam


Neurological Exam: Alert, Awake, CN II-XII Intact, Normal Gait, Oriented x3





- Psychiatric Exam


Psychiatric exam: Normal Affect, Normal Mood





- Skin


Skin Exam: Dry, Intact, Normal Color, Warm





Assessment and Plan


(1) DVT prophylaxis


Status: Chronic   





(2) Scrotal edema


Status: Chronic   





(3) CAD (coronary artery disease)


Status: Chronic   





(4) Smoking


Status: Chronic   





(5) Low back pain


Status: Chronic   





(6) Prediabetes


Status: Chronic   





(7) Neurocognitive disorder


Status: Chronic

## 2017-09-06 RX ADMIN — DIVALPROEX SODIUM SCH MG: 500 TABLET, DELAYED RELEASE ORAL at 16:17

## 2017-09-06 RX ADMIN — DIVALPROEX SODIUM SCH MG: 500 TABLET, DELAYED RELEASE ORAL at 08:36

## 2017-09-06 NOTE — CP.PCM.PN
Subjective





- Date & Time of Evaluation


Date of Evaluation: 09/06/17


Time of Evaluation: 08:34





- Subjective


Subjective: 





s/o no f/c, n/v/d. no other complaints. calmer


a/p cont plan, cont placement, klonpin prn, cont seroquel





Objective





- Vital Signs/Intake and Output


Vital Signs (last 24 hours): 


 











Temp Pulse Resp BP Pulse Ox


 


 97.3 F L  64   20   109/74   95 


 


 09/06/17 08:27  09/06/17 08:27  09/06/17 08:27  09/06/17 08:27  09/06/17 08:27











- Medications


Medications: 


 Current Medications





Atorvastatin Calcium (Lipitor)  20 mg PO [facility]


   Last Admin: 09/05/17 21:13 Dose:  20 mg


Clonazepam (Klonopin)  0.5 mg PO Q12 PRN


   PRN Reason: Agitation


   Last Admin: 08/16/17 09:28 Dose:  0.5 mg


Divalproex Sodium (Depakote Dr(*Bid*))  500 mg PO BID [facility]


   Last Admin: 09/05/17 16:11 Dose:  500 mg


Enalapril Maleate (Vasotec)  10 mg PO DAILY [facility]


   Last Admin: 09/05/17 08:18 Dose:  10 mg


Fenofibrate (Tricor)  145 mg PO DAILY [facility]


   Last Admin: 09/05/17 08:17 Dose:  145 mg


Metoprolol Tartrate (Lopressor)  50 mg PO Q12 [facility]


   Last Admin: 09/05/17 20:29 Dose:  50 mg


Quetiapine Fumarate (Seroquel)  25 mg PO [facility]


   Last Admin: 09/05/17 21:14 Dose:  25 mg











- Labs


Labs: 


 





 07/13/17 04:20 





 07/13/17 04:20 





 











PT  11.2 SECONDS (9.4-12.0)   12/14/15  05:20    


 


INR  1.05  (0.89-1.20)   12/14/15  05:20    


 


APTT  36.2 SECONDS (23.1-32.0)  H  12/14/15  05:20    














- Constitutional


Appears: Well, Non-toxic, No Acute Distress





- Head Exam


Head Exam: ATRAUMATIC, NORMAL INSPECTION, NORMOCEPHALIC





- Eye Exam


Eye Exam: EOMI, Normal appearance, PERRL


Pupil Exam: NORMAL ACCOMODATION, PERRL





- ENT Exam


ENT Exam: Mucous Membranes Moist, Normal Exam





- Neck Exam


Neck Exam: Full ROM, Normal Inspection.  absent: Lymphadenopathy





- Respiratory Exam


Respiratory Exam: Clear to Ausculation Bilateral, NORMAL BREATHING PATTERN





- Cardiovascular Exam


Cardiovascular Exam: REGULAR RHYTHM, RRR, +S1, +S2.  absent: Murmur





- GI/Abdominal Exam


GI & Abdominal Exam: Soft, Normal Bowel Sounds.  absent: Tenderness





- Extremities Exam


Extremities Exam: Full ROM, Normal Capillary Refill, Normal Inspection.  absent

: Joint Swelling, Pedal Edema





- Back Exam


Back Exam: NORMAL INSPECTION





- Neurological Exam


Neurological Exam: Alert, Awake, CN II-XII Intact, Normal Gait, Oriented x3





- Psychiatric Exam


Psychiatric exam: Normal Affect, Normal Mood





- Skin


Skin Exam: Dry, Intact, Normal Color, Warm





Assessment and Plan


(1) DVT prophylaxis


Status: Chronic   





(2) Scrotal edema


Status: Chronic   





(3) CAD (coronary artery disease)


Status: Chronic   





(4) Smoking


Status: Chronic   





(5) Low back pain


Status: Chronic   





(6) Prediabetes


Status: Chronic   





(7) Neurocognitive disorder


Status: Chronic

## 2017-09-07 RX ADMIN — DIVALPROEX SODIUM SCH MG: 500 TABLET, DELAYED RELEASE ORAL at 16:08

## 2017-09-07 RX ADMIN — DIVALPROEX SODIUM SCH MG: 500 TABLET, DELAYED RELEASE ORAL at 08:45

## 2017-09-07 NOTE — CP.PCM.PN
Subjective





- Date & Time of Evaluation


Date of Evaluation: 09/07/17


Time of Evaluation: 08:10





- Subjective


Subjective: 





s/o no f/c, n/v/d. no other complaints. calmer


a/p cont plan, cont placement, klonpin prn, cont seroquel





Objective





- Vital Signs/Intake and Output


Vital Signs (last 24 hours): 


 











Temp Pulse Resp BP Pulse Ox


 


 97.3 F L  67   20   132/74   98 


 


 09/07/17 07:24  09/07/17 07:24  09/07/17 07:24  09/07/17 07:24  09/07/17 07:24











- Medications


Medications: 


 Current Medications





Atorvastatin Calcium (Lipitor)  20 mg PO [facility]


   Last Admin: 09/06/17 21:31 Dose:  20 mg


Clonazepam (Klonopin)  0.5 mg PO Q12 PRN


   PRN Reason: Agitation


   Last Admin: 08/16/17 09:28 Dose:  0.5 mg


Divalproex Sodium (Depakote Dr(*Bid*))  500 mg PO BID [facility]


   Last Admin: 09/06/17 16:17 Dose:  500 mg


Enalapril Maleate (Vasotec)  10 mg PO DAILY [facility]


   Last Admin: 09/06/17 08:37 Dose:  10 mg


Fenofibrate (Tricor)  145 mg PO DAILY [facility]


   Last Admin: 09/06/17 08:36 Dose:  145 mg


Metoprolol Tartrate (Lopressor)  50 mg PO Q12 [facility]


   Last Admin: 09/06/17 21:28 Dose:  50 mg


Quetiapine Fumarate (Seroquel)  25 mg PO [facility]


   Last Admin: 09/06/17 21:31 Dose:  25 mg











- Labs


Labs: 


 





 07/13/17 04:20 





 07/13/17 04:20 





 











PT  11.2 SECONDS (9.4-12.0)   12/14/15  05:20    


 


INR  1.05  (0.89-1.20)   12/14/15  05:20    


 


APTT  36.2 SECONDS (23.1-32.0)  H  12/14/15  05:20    














- Constitutional


Appears: Well, Non-toxic, No Acute Distress





- Head Exam


Head Exam: ATRAUMATIC, NORMAL INSPECTION, NORMOCEPHALIC





- Eye Exam


Eye Exam: EOMI, Normal appearance, PERRL


Pupil Exam: NORMAL ACCOMODATION, PERRL





- ENT Exam


ENT Exam: Mucous Membranes Moist, Normal Exam





- Neck Exam


Neck Exam: Full ROM, Normal Inspection.  absent: Lymphadenopathy





- Respiratory Exam


Respiratory Exam: Clear to Ausculation Bilateral, NORMAL BREATHING PATTERN





- Cardiovascular Exam


Cardiovascular Exam: REGULAR RHYTHM, RRR, +S1, +S2.  absent: Murmur





- GI/Abdominal Exam


GI & Abdominal Exam: Soft, Normal Bowel Sounds.  absent: Tenderness





- Extremities Exam


Extremities Exam: Full ROM, Normal Capillary Refill, Normal Inspection.  absent

: Joint Swelling, Pedal Edema





- Back Exam


Back Exam: NORMAL INSPECTION





- Neurological Exam


Neurological Exam: Alert, Awake, CN II-XII Intact, Normal Gait, Oriented x3





- Psychiatric Exam


Psychiatric exam: Normal Affect, Normal Mood





- Skin


Skin Exam: Dry, Intact, Normal Color, Warm





Assessment and Plan


(1) DVT prophylaxis


Status: Chronic   





(2) Scrotal edema


Status: Chronic   





(3) CAD (coronary artery disease)


Status: Chronic   





(4) Smoking


Status: Chronic   





(5) Low back pain


Status: Chronic   





(6) Prediabetes


Status: Chronic   





(7) Neurocognitive disorder


Status: Chronic

## 2017-09-08 RX ADMIN — DIVALPROEX SODIUM SCH MG: 500 TABLET, DELAYED RELEASE ORAL at 08:48

## 2017-09-08 RX ADMIN — DIVALPROEX SODIUM SCH MG: 500 TABLET, DELAYED RELEASE ORAL at 16:47

## 2017-09-08 NOTE — CP.PCM.PN
Subjective





- Date & Time of Evaluation


Date of Evaluation: 09/08/17


Time of Evaluation: 07:30





- Subjective


Subjective: 





s/o no f/c, n/v/d. no other complaints. calmer


a/p cont plan, cont placement, klonpin prn, cont seroquel





Objective





- Vital Signs/Intake and Output


Vital Signs (last 24 hours): 


 











Temp Pulse Resp BP Pulse Ox


 


 97.9 F   63   20   109/70   96 


 


 09/08/17 00:28  09/08/17 00:28  09/08/17 00:28  09/08/17 00:28  09/08/17 00:28











- Medications


Medications: 


 Current Medications





Atorvastatin Calcium (Lipitor)  20 mg PO [facility]


   Last Admin: 09/07/17 21:16 Dose:  20 mg


Clonazepam (Klonopin)  0.5 mg PO Q12 PRN


   PRN Reason: Agitation


   Last Admin: 08/16/17 09:28 Dose:  0.5 mg


Divalproex Sodium (Depakote Dr(*Bid*))  500 mg PO BID [facility]


   Last Admin: 09/07/17 16:08 Dose:  500 mg


Enalapril Maleate (Vasotec)  10 mg PO DAILY [facility]


   Last Admin: 09/07/17 08:45 Dose:  10 mg


Fenofibrate (Tricor)  145 mg PO DAILY [facility]


   Last Admin: 09/07/17 08:45 Dose:  145 mg


Metoprolol Tartrate (Lopressor)  50 mg PO Q12 [facility]


   Last Admin: 09/07/17 21:17 Dose:  50 mg


Quetiapine Fumarate (Seroquel)  25 mg PO [facility]


   Last Admin: 09/07/17 21:17 Dose:  25 mg











- Labs


Labs: 


 





 07/13/17 04:20 





 07/13/17 04:20 





 











PT  11.2 SECONDS (9.4-12.0)   12/14/15  05:20    


 


INR  1.05  (0.89-1.20)   12/14/15  05:20    


 


APTT  36.2 SECONDS (23.1-32.0)  H  12/14/15  05:20    














- Constitutional


Appears: Well, Non-toxic, No Acute Distress





- Head Exam


Head Exam: ATRAUMATIC, NORMAL INSPECTION, NORMOCEPHALIC





- Eye Exam


Eye Exam: EOMI, Normal appearance, PERRL


Pupil Exam: NORMAL ACCOMODATION, PERRL





- ENT Exam


ENT Exam: Mucous Membranes Moist, Normal Exam





- Neck Exam


Neck Exam: Full ROM, Normal Inspection.  absent: Lymphadenopathy





- Respiratory Exam


Respiratory Exam: Clear to Ausculation Bilateral, NORMAL BREATHING PATTERN





- Cardiovascular Exam


Cardiovascular Exam: REGULAR RHYTHM, +S1, +S2.  absent: Murmur





- GI/Abdominal Exam


GI & Abdominal Exam: Soft, Normal Bowel Sounds.  absent: Tenderness





- Extremities Exam


Extremities Exam: Full ROM, Normal Capillary Refill, Normal Inspection.  absent

: Joint Swelling, Pedal Edema





- Back Exam


Back Exam: NORMAL INSPECTION





- Neurological Exam


Neurological Exam: Alert, Awake, CN II-XII Intact, Normal Gait, Oriented x3





- Psychiatric Exam


Psychiatric exam: Normal Affect, Normal Mood





- Skin


Skin Exam: Dry, Intact, Normal Color, Warm





Assessment and Plan


(1) DVT prophylaxis


Status: Chronic   





(2) Scrotal edema


Status: Chronic   





(3) CAD (coronary artery disease)


Status: Chronic   





(4) Smoking


Status: Chronic   





(5) Low back pain


Status: Chronic   





(6) Prediabetes


Status: Chronic   





(7) Neurocognitive disorder


Status: Chronic

## 2017-09-09 RX ADMIN — DIVALPROEX SODIUM SCH MG: 500 TABLET, DELAYED RELEASE ORAL at 17:17

## 2017-09-09 RX ADMIN — DIVALPROEX SODIUM SCH MG: 500 TABLET, DELAYED RELEASE ORAL at 08:13

## 2017-09-09 NOTE — CP.PCM.PN
Subjective





- Date & Time of Evaluation


Date of Evaluation: 09/09/17


Time of Evaluation: 10:29





- Subjective


Subjective: 





s/o no f/c, n/v/d. no other complaints. calmer


a/p cont plan, cont placement, klonpin prn, cont seroquel








Objective





- Vital Signs/Intake and Output


Vital Signs (last 24 hours): 


 











Temp Pulse Resp BP Pulse Ox


 


 98 F   59 L  18   100/68   99 


 


 09/09/17 08:22  09/09/17 08:22  09/09/17 08:22  09/09/17 08:22  09/09/17 08:22











- Medications


Medications: 


 Current Medications





Atorvastatin Calcium (Lipitor)  20 mg PO [facility]


   Last Admin: 09/08/17 21:41 Dose:  20 mg


Clonazepam (Klonopin)  0.5 mg PO Q12 PRN


   PRN Reason: Agitation


   Last Admin: 08/16/17 09:28 Dose:  0.5 mg


Divalproex Sodium (Depakote Dr(*Bid*))  500 mg PO BID [facility]


   Last Admin: 09/09/17 08:13 Dose:  500 mg


Enalapril Maleate (Vasotec)  10 mg PO DAILY [facility]


   Last Admin: 09/09/17 08:15 Dose:  10 mg


Fenofibrate (Tricor)  145 mg PO DAILY [facility]


   Last Admin: 09/09/17 08:15 Dose:  145 mg


Metoprolol Tartrate (Lopressor)  50 mg PO Q12 [facility]


   Last Admin: 09/09/17 08:14 Dose:  50 mg


Quetiapine Fumarate (Seroquel)  25 mg PO [facility]


   Last Admin: 09/08/17 21:42 Dose:  25 mg











- Labs


Labs: 


 





 07/13/17 04:20 





 07/13/17 04:20 





 











PT  11.2 SECONDS (9.4-12.0)   12/14/15  05:20    


 


INR  1.05  (0.89-1.20)   12/14/15  05:20    


 


APTT  36.2 SECONDS (23.1-32.0)  H  12/14/15  05:20    














- Constitutional


Appears: Well, Non-toxic, No Acute Distress





- Head Exam


Head Exam: ATRAUMATIC, NORMAL INSPECTION, NORMOCEPHALIC





- Eye Exam


Eye Exam: EOMI, Normal appearance, PERRL


Pupil Exam: NORMAL ACCOMODATION, PERRL





- ENT Exam


ENT Exam: Mucous Membranes Moist, Normal Exam





- Neck Exam


Neck Exam: Full ROM, Normal Inspection.  absent: Lymphadenopathy





- Respiratory Exam


Respiratory Exam: Clear to Ausculation Bilateral, NORMAL BREATHING PATTERN





- Cardiovascular Exam


Cardiovascular Exam: REGULAR RHYTHM, RRR, +S1, +S2.  absent: Murmur





- GI/Abdominal Exam


GI & Abdominal Exam: Soft, Normal Bowel Sounds.  absent: Tenderness





- Extremities Exam


Extremities Exam: Full ROM, Normal Capillary Refill, Normal Inspection.  absent

: Joint Swelling, Pedal Edema





- Back Exam


Back Exam: NORMAL INSPECTION





- Neurological Exam


Neurological Exam: Alert, Awake, CN II-XII Intact, Normal Gait, Oriented x3





- Psychiatric Exam


Psychiatric exam: Normal Affect, Normal Mood





- Skin


Skin Exam: Dry, Intact, Normal Color, Warm





Assessment and Plan


(1) DVT prophylaxis


Status: Chronic   





(2) Scrotal edema


Status: Chronic   





(3) CAD (coronary artery disease)


Status: Chronic   





(4) Smoking


Status: Chronic   





(5) Low back pain


Status: Chronic   





(6) Prediabetes


Status: Chronic   





(7) Neurocognitive disorder


Status: Chronic

## 2017-09-10 RX ADMIN — DIVALPROEX SODIUM SCH MG: 500 TABLET, DELAYED RELEASE ORAL at 17:04

## 2017-09-10 RX ADMIN — DIVALPROEX SODIUM SCH MG: 500 TABLET, DELAYED RELEASE ORAL at 10:06

## 2017-09-10 NOTE — CP.PCM.PN
Subjective





- Date & Time of Evaluation


Date of Evaluation: 09/10/17


Time of Evaluation: 12:40





- Subjective


Subjective: 





s/o no f/c, n/v/d. no other complaints. calmer


a/p cont plan, cont placement, klonpin prn, cont seroquel





Objective





- Vital Signs/Intake and Output


Vital Signs (last 24 hours): 


 











Temp Pulse Resp BP Pulse Ox


 


 97.6 F   64   20   109/70   99 


 


 09/10/17 07:57  09/10/17 10:06  09/10/17 07:57  09/10/17 10:06  09/10/17 07:57











- Medications


Medications: 


 Current Medications





Atorvastatin Calcium (Lipitor)  20 mg PO [facility]


   Last Admin: 09/09/17 21:27 Dose:  20 mg


Clonazepam (Klonopin)  0.5 mg PO Q12 PRN


   PRN Reason: Agitation


   Last Admin: 08/16/17 09:28 Dose:  0.5 mg


Divalproex Sodium (Depakote Dr(*Bid*))  500 mg PO BID [facility]


   Last Admin: 09/10/17 10:06 Dose:  500 mg


Enalapril Maleate (Vasotec)  10 mg PO DAILY [facility]


   Last Admin: 09/10/17 10:07 Dose:  10 mg


Fenofibrate (Tricor)  145 mg PO DAILY [facility]


   Last Admin: 09/10/17 10:07 Dose:  145 mg


Metoprolol Tartrate (Lopressor)  50 mg PO Q12 [facility]


   Last Admin: 09/10/17 10:06 Dose:  50 mg


Quetiapine Fumarate (Seroquel)  25 mg PO [facility]


   Last Admin: 09/09/17 21:27 Dose:  25 mg











- Labs


Labs: 


 





 07/13/17 04:20 





 07/13/17 04:20 





 











PT  11.2 SECONDS (9.4-12.0)   12/14/15  05:20    


 


INR  1.05  (0.89-1.20)   12/14/15  05:20    


 


APTT  36.2 SECONDS (23.1-32.0)  H  12/14/15  05:20    














- Constitutional


Appears: Well, Non-toxic, No Acute Distress





- Head Exam


Head Exam: ATRAUMATIC, NORMAL INSPECTION, NORMOCEPHALIC





- Eye Exam


Eye Exam: EOMI, Normal appearance, PERRL


Pupil Exam: NORMAL ACCOMODATION, PERRL





- ENT Exam


ENT Exam: Mucous Membranes Moist, Normal Exam





- Neck Exam


Neck Exam: Full ROM, Normal Inspection.  absent: Lymphadenopathy





- Respiratory Exam


Respiratory Exam: Clear to Ausculation Bilateral, NORMAL BREATHING PATTERN





- Cardiovascular Exam


Cardiovascular Exam: REGULAR RHYTHM, RRR, +S1, +S2.  absent: Murmur





- GI/Abdominal Exam


GI & Abdominal Exam: Soft, Normal Bowel Sounds.  absent: Tenderness





- Extremities Exam


Extremities Exam: Full ROM, Normal Capillary Refill, Normal Inspection.  absent

: Joint Swelling, Pedal Edema





- Back Exam


Back Exam: NORMAL INSPECTION





- Neurological Exam


Neurological Exam: Alert, Awake, CN II-XII Intact, Normal Gait, Oriented x3





- Psychiatric Exam


Psychiatric exam: Normal Affect, Normal Mood





- Skin


Skin Exam: Dry, Intact, Normal Color, Warm





Assessment and Plan


(1) DVT prophylaxis


Status: Chronic   





(2) Scrotal edema


Status: Chronic   





(3) CAD (coronary artery disease)


Status: Chronic   





(4) Smoking


Status: Chronic   





(5) Low back pain


Status: Chronic   





(6) Prediabetes


Status: Chronic   





(7) Neurocognitive disorder


Status: Chronic

## 2017-09-11 RX ADMIN — DIVALPROEX SODIUM SCH MG: 500 TABLET, DELAYED RELEASE ORAL at 16:16

## 2017-09-11 RX ADMIN — DIVALPROEX SODIUM SCH MG: 500 TABLET, DELAYED RELEASE ORAL at 08:29

## 2017-09-11 NOTE — CP.PCM.PN
Subjective





- Date & Time of Evaluation


Date of Evaluation: 09/11/17


Time of Evaluation: 10:48





- Subjective


Subjective: 





s/o no f/c, n/v/d. no other complaints. calmer


a/p cont plan, cont placement, klonpin prn, cont seroquel





Objective





- Vital Signs/Intake and Output


Vital Signs (last 24 hours): 


 











Temp Pulse Resp BP Pulse Ox


 


 97.3 F L  65   20   106/69   98 


 


 09/11/17 07:57  09/11/17 08:29  09/11/17 07:57  09/11/17 08:29  09/11/17 07:57











- Medications


Medications: 


 Current Medications





Atorvastatin Calcium (Lipitor)  20 mg PO HS [facility]


   Last Admin: 09/10/17 21:10 Dose:  20 mg


Clonazepam (Klonopin)  0.5 mg PO Q12 PRN


   PRN Reason: Agitation


   Last Admin: 08/16/17 09:28 Dose:  0.5 mg


Divalproex Sodium (Depakote Dr(*Bid*))  500 mg PO BID [facility]


   Last Admin: 09/11/17 08:29 Dose:  500 mg


Enalapril Maleate (Vasotec)  10 mg PO DAILY [facility]


   Last Admin: 09/11/17 08:30 Dose:  10 mg


Fenofibrate (Tricor)  145 mg PO DAILY [facility]


   Last Admin: 09/11/17 08:30 Dose:  145 mg


Metoprolol Tartrate (Lopressor)  50 mg PO Q12 [facility]


   Last Admin: 09/11/17 08:29 Dose:  50 mg


Quetiapine Fumarate (Seroquel)  25 mg PO [facility]


   Last Admin: 09/10/17 21:10 Dose:  25 mg











- Labs


Labs: 


 





 07/13/17 04:20 





 07/13/17 04:20 





 











PT  11.2 SECONDS (9.4-12.0)   12/14/15  05:20    


 


INR  1.05  (0.89-1.20)   12/14/15  05:20    


 


APTT  36.2 SECONDS (23.1-32.0)  H  12/14/15  05:20    














- Constitutional


Appears: Well, Non-toxic, No Acute Distress





- Head Exam


Head Exam: ATRAUMATIC, NORMAL INSPECTION, NORMOCEPHALIC





- Eye Exam


Eye Exam: EOMI, Normal appearance, PERRL


Pupil Exam: NORMAL ACCOMODATION, PERRL





- ENT Exam


ENT Exam: Mucous Membranes Moist, Normal Exam





- Neck Exam


Neck Exam: Full ROM, Normal Inspection.  absent: Lymphadenopathy





- Respiratory Exam


Respiratory Exam: Clear to Ausculation Bilateral, NORMAL BREATHING PATTERN





- Cardiovascular Exam


Cardiovascular Exam: REGULAR RHYTHM, RRR, +S1, +S2.  absent: Murmur





- GI/Abdominal Exam


GI & Abdominal Exam: Soft, Normal Bowel Sounds.  absent: Tenderness





- Extremities Exam


Extremities Exam: Full ROM, Normal Capillary Refill, Normal Inspection.  absent

: Joint Swelling, Pedal Edema





- Back Exam


Back Exam: NORMAL INSPECTION





- Neurological Exam


Neurological Exam: Alert, Awake, CN II-XII Intact, Normal Gait, Oriented x3





- Psychiatric Exam


Psychiatric exam: Normal Affect, Normal Mood





- Skin


Skin Exam: Dry, Intact, Normal Color, Warm





Assessment and Plan


(1) DVT prophylaxis


Status: Chronic   





(2) Scrotal edema


Status: Chronic   





(3) CAD (coronary artery disease)


Status: Chronic   





(4) Smoking


Status: Chronic   





(5) Low back pain


Status: Chronic   





(6) Prediabetes


Status: Chronic   





(7) Neurocognitive disorder


Status: Chronic

## 2017-09-12 RX ADMIN — DIVALPROEX SODIUM SCH MG: 500 TABLET, DELAYED RELEASE ORAL at 08:58

## 2017-09-12 RX ADMIN — DIVALPROEX SODIUM SCH MG: 500 TABLET, DELAYED RELEASE ORAL at 16:53

## 2017-09-12 NOTE — CP.PCM.PN
Subjective





- Date & Time of Evaluation


Date of Evaluation: 09/12/17


Time of Evaluation: 10:13





- Subjective


Subjective: 





s/o no f/c, n/v/d. no other complaints. calmer


a/p cont plan, cont placement, klonpin prn, cont seroquel





Objective





- Vital Signs/Intake and Output


Vital Signs (last 24 hours): 


 











Temp Pulse Resp BP Pulse Ox


 


 97.5 F L  88   20   93/64 L  97 


 


 09/12/17 08:19  09/12/17 08:19  09/12/17 08:19  09/12/17 08:19  09/12/17 08:19











- Medications


Medications: 


 Current Medications





Atorvastatin Calcium (Lipitor)  20 mg PO HS [facility]


   Last Admin: 09/11/17 21:26 Dose:  20 mg


Clonazepam (Klonopin)  0.5 mg PO Q12 PRN


   PRN Reason: Agitation


   Last Admin: 08/16/17 09:28 Dose:  0.5 mg


Divalproex Sodium (Depakote Dr(*Bid*))  500 mg PO BID [facility]


   Last Admin: 09/12/17 08:58 Dose:  500 mg


Enalapril Maleate (Vasotec)  10 mg PO DAILY [facility]


   Last Admin: 09/11/17 08:30 Dose:  10 mg


Fenofibrate (Tricor)  145 mg PO DAILY [facility]


   Last Admin: 09/12/17 08:58 Dose:  145 mg


Metoprolol Tartrate (Lopressor)  50 mg PO Q12 [facility]


   Last Admin: 09/11/17 21:27 Dose:  Not Given


Quetiapine Fumarate (Seroquel)  25 mg PO [facility]


   Last Admin: 09/11/17 21:26 Dose:  25 mg











- Labs


Labs: 


 





 07/13/17 04:20 





 07/13/17 04:20 





 











PT  11.2 SECONDS (9.4-12.0)   12/14/15  05:20    


 


INR  1.05  (0.89-1.20)   12/14/15  05:20    


 


APTT  36.2 SECONDS (23.1-32.0)  H  12/14/15  05:20    














- Constitutional


Appears: Well, Non-toxic, No Acute Distress





- Head Exam


Head Exam: ATRAUMATIC, NORMAL INSPECTION, NORMOCEPHALIC





- Eye Exam


Eye Exam: EOMI, Normal appearance, PERRL


Pupil Exam: NORMAL ACCOMODATION, PERRL





- ENT Exam


ENT Exam: Mucous Membranes Moist, Normal Exam





- Neck Exam


Neck Exam: Full ROM, Normal Inspection.  absent: Lymphadenopathy





- Respiratory Exam


Respiratory Exam: Clear to Ausculation Bilateral, NORMAL BREATHING PATTERN





- Cardiovascular Exam


Cardiovascular Exam: REGULAR RHYTHM, RRR, +S1, +S2.  absent: Murmur





- GI/Abdominal Exam


GI & Abdominal Exam: Soft, Normal Bowel Sounds.  absent: Tenderness





- Extremities Exam


Extremities Exam: Full ROM, Normal Capillary Refill, Normal Inspection.  absent

: Joint Swelling, Pedal Edema





- Back Exam


Back Exam: NORMAL INSPECTION





- Neurological Exam


Neurological Exam: Alert, Awake, CN II-XII Intact, Normal Gait, Oriented x3





- Psychiatric Exam


Psychiatric exam: Normal Affect, Normal Mood





- Skin


Skin Exam: Dry, Intact, Normal Color, Warm





Assessment and Plan


(1) DVT prophylaxis


Status: Chronic   





(2) Scrotal edema


Status: Chronic   





(3) CAD (coronary artery disease)


Status: Chronic   





(4) Smoking


Status: Chronic   





(5) Low back pain


Status: Chronic   





(6) Prediabetes


Status: Chronic   





(7) Neurocognitive disorder


Status: Chronic

## 2017-09-13 RX ADMIN — DIVALPROEX SODIUM SCH MG: 500 TABLET, DELAYED RELEASE ORAL at 18:06

## 2017-09-13 RX ADMIN — DIVALPROEX SODIUM SCH MG: 500 TABLET, DELAYED RELEASE ORAL at 09:45

## 2017-09-13 NOTE — CP.PCM.PN
Subjective





- Date & Time of Evaluation


Date of Evaluation: 09/13/17


Time of Evaluation: 10:02





- Subjective


Subjective: 





s/o no f/c, n/v/d. no other complaints. calmer


a/p cont plan, cont placement, klonpin prn, cont seroquel





Objective





- Vital Signs/Intake and Output


Vital Signs (last 24 hours): 


 











Temp Pulse Resp BP Pulse Ox


 


 98.1 F   74   20   107/63   97 


 


 09/13/17 08:07  09/13/17 09:45  09/13/17 08:07  09/13/17 09:45  09/13/17 08:07











- Medications


Medications: 


 Current Medications





Atorvastatin Calcium (Lipitor)  20 mg PO [facility]


   Last Admin: 09/12/17 21:29 Dose:  20 mg


Clonazepam (Klonopin)  0.5 mg PO Q12 PRN


   PRN Reason: Agitation


   Last Admin: 08/16/17 09:28 Dose:  0.5 mg


Divalproex Sodium (Depakote Dr(*Bid*))  [facility] mg [facility]ID [facility]


   Last Admin: 09/13/17 09:45 Dose:  500 mg


Enalapril Maleate (Vasotec)  10 mg PO [facility]ILY [facility]


   Last Admin: 09/13/17 09:45 Dose:  10 mg


Fenofibrate (Tricor)  145 mg PO [facility]ILY [facility]


   Last Admin: 09/13/17 09:45 Dose:  145 mg


Metoprolol Tartrate (Lopressor)  50 mg PO Q12 [facility]


   Last Admin: 09/13/17 09:45 Dose:  50 mg


Quetiapine Fumarate (Seroquel)  25 mg PO [facility]


   Last Admin: 09/12/17 21:29 Dose:  25 mg











- Labs


Labs: 


 





 07/13/17 04:20 





 07/13/17 04:20 





 











PT  11.2 SECONDS (9.4-12.0)   12/14/15  05:20    


 


INR  1.05  (0.89-1.20)   12/14/15  05:20    


 


APTT  36.2 SECONDS (23.1-32.0)  H  12/14/15  05:20    














- Constitutional


Appears: Well, Non-toxic, No Acute Distress





- Head Exam


Head Exam: ATRAUMATIC, NORMAL INSPECTION, NORMOCEPHALIC





- Eye Exam


Eye Exam: EOMI, Normal appearance, PERRL


Pupil Exam: NORMAL ACCOMODATION, PERRL





- ENT Exam


ENT Exam: Mucous Membranes Moist, Normal Exam





- Neck Exam


Neck Exam: Full ROM, Normal Inspection.  absent: Lymphadenopathy





- Respiratory Exam


Respiratory Exam: Clear to Ausculation Bilateral, NORMAL BREATHING PATTERN





- Cardiovascular Exam


Cardiovascular Exam: REGULAR RHYTHM, RRR, +S1, +S2.  absent: Murmur





- GI/Abdominal Exam


GI & Abdominal Exam: Soft, Normal Bowel Sounds.  absent: Tenderness





- Extremities Exam


Extremities Exam: Full ROM, Normal Capillary Refill, Normal Inspection.  absent

: Joint Swelling, Pedal Edema





- Back Exam


Back Exam: NORMAL INSPECTION





- Neurological Exam


Neurological Exam: Alert, Awake, CN II-XII Intact, Normal Gait, Oriented x3





- Psychiatric Exam


Psychiatric exam: Normal Affect, Normal Mood





- Skin


Skin Exam: Dry, Intact, Normal Color, Warm





Assessment and Plan


(1) DVT prophylaxis


Status: Chronic   





(2) Scrotal edema


Status: Chronic   





(3) CAD (coronary artery disease)


Status: Chronic   





(4) Smoking


Status: Chronic   





(5) Low back pain


Status: Chronic   





(6) Prediabetes


Status: Chronic   





(7) Neurocognitive disorder


Status: Chronic

## 2017-09-14 RX ADMIN — DIVALPROEX SODIUM SCH MG: 500 TABLET, DELAYED RELEASE ORAL at 16:36

## 2017-09-14 RX ADMIN — DIVALPROEX SODIUM SCH MG: 500 TABLET, DELAYED RELEASE ORAL at 08:17

## 2017-09-14 NOTE — CP.PCM.PN
Subjective





- Date & Time of Evaluation


Date of Evaluation: 09/14/17


Time of Evaluation: 07:59





- Subjective


Subjective: 





s/o no f/c, n/v/d. no other complaints. calmer


a/p cont plan, cont placement, klonpin prn, cont seroquel





Objective





- Vital Signs/Intake and Output


Vital Signs (last 24 hours): 


 











Temp Pulse Resp BP Pulse Ox


 


 98.4 F   79   20   131/79   98 


 


 09/14/17 00:28  09/14/17 00:28  09/14/17 00:28  09/14/17 00:28  09/14/17 00:28











- Medications


Medications: 


 Current Medications





Atorvastatin Calcium (Lipitor)  20 mg PO [facility]


   Last Admin: 09/13/17 21:13 Dose:  20 mg


Clonazepam (Klonopin)  0.5 mg PO Q12 PRN


   PRN Reason: Agitation


   Last Admin: 08/16/17 09:28 Dose:  0.5 mg


Divalproex Sodium (Depakote Dr(*Bid*))  500 mg PO BID [facility]


   Last Admin: 09/13/17 18:06 Dose:  500 mg


Enalapril Maleate (Vasotec)  10 mg PO DAILY [facility]


   Last Admin: 09/13/17 09:45 Dose:  10 mg


Fenofibrate (Tricor)  145 mg PO DAILY [facility]


   Last Admin: 09/13/17 09:45 Dose:  145 mg


Metoprolol Tartrate (Lopressor)  50 mg PO Q12 [facility]


   Last Admin: 09/13/17 21:14 Dose:  Not Given


Quetiapine Fumarate (Seroquel)  25 mg PO [facility]


   Last Admin: 09/13/17 21:13 Dose:  25 mg











- Labs


Labs: 


 





 07/13/17 04:20 





 07/13/17 04:20 





 











PT  11.2 SECONDS (9.4-12.0)   12/14/15  05:20    


 


INR  1.05  (0.89-1.20)   12/14/15  05:20    


 


APTT  36.2 SECONDS (23.1-32.0)  H  12/14/15  05:20    














- Constitutional


Appears: Well, Non-toxic, No Acute Distress





- Head Exam


Head Exam: ATRAUMATIC, NORMAL INSPECTION, NORMOCEPHALIC





- Eye Exam


Eye Exam: EOMI, Normal appearance, PERRL


Pupil Exam: NORMAL ACCOMODATION, PERRL





- ENT Exam


ENT Exam: Mucous Membranes Moist, Normal Exam





- Neck Exam


Neck Exam: Full ROM, Normal Inspection.  absent: Lymphadenopathy





- Respiratory Exam


Respiratory Exam: Clear to Ausculation Bilateral, NORMAL BREATHING PATTERN





- Cardiovascular Exam


Cardiovascular Exam: REGULAR RHYTHM, RRR, +S1, +S2.  absent: Murmur





- GI/Abdominal Exam


GI & Abdominal Exam: Soft, Normal Bowel Sounds.  absent: Tenderness





- Extremities Exam


Extremities Exam: Full ROM, Normal Capillary Refill, Normal Inspection.  absent

: Joint Swelling, Pedal Edema





- Back Exam


Back Exam: NORMAL INSPECTION





- Neurological Exam


Neurological Exam: Alert, Awake, CN II-XII Intact, Normal Gait, Oriented x3





- Psychiatric Exam


Psychiatric exam: Normal Affect, Normal Mood





- Skin


Skin Exam: Dry, Intact, Normal Color, Warm





Assessment and Plan


(1) DVT prophylaxis


Status: Chronic   





(2) Scrotal edema


Status: Chronic   





(3) CAD (coronary artery disease)


Status: Chronic   





(4) Smoking


Status: Chronic   





(5) Low back pain


Status: Chronic   





(6) Prediabetes


Status: Chronic   





(7) Neurocognitive disorder


Status: Chronic

## 2017-09-15 RX ADMIN — DIVALPROEX SODIUM SCH MG: 500 TABLET, DELAYED RELEASE ORAL at 16:05

## 2017-09-15 RX ADMIN — DIVALPROEX SODIUM SCH MG: 500 TABLET, DELAYED RELEASE ORAL at 08:28

## 2017-09-15 NOTE — CP.PCM.PN
Subjective





- Date & Time of Evaluation


Date of Evaluation: 09/15/17


Time of Evaluation: 11:35





- Subjective


Subjective: 





s/o no f/c, n/v/d. no other complaints. calmer


a/p cont plan, cont placement, klonpin prn, cont seroquel





Objective





- Vital Signs/Intake and Output


Vital Signs (last 24 hours): 


 











Temp Pulse Resp BP Pulse Ox


 


 97.6 F   70   20   103/72   99 


 


 09/15/17 08:21  09/15/17 08:28  09/15/17 08:21  09/15/17 08:21  09/15/17 08:21











- Medications


Medications: 


 Current Medications





Atorvastatin Calcium (Lipitor)  20 mg PO [facility]


   Last Admin: 09/14/17 21:25 Dose:  20 mg


Clonazepam (Klonopin)  0.5 mg PO Q12 PRN


   PRN Reason: Agitation


   Last Admin: 08/16/17 09:28 Dose:  0.5 mg


Divalproex Sodium (Depakote Dr(*Bid*))  500 mg PO BID [facility]


   Last Admin: 09/15/17 08:28 Dose:  500 mg


Enalapril Maleate (Vasotec)  10 mg PO DAILY [facility]


   Last Admin: 09/15/17 08:28 Dose:  10 mg


Fenofibrate (Tricor)  145 mg PO DAILY [facility]


   Last Admin: 09/15/17 08:28 Dose:  145 mg


Metoprolol Tartrate (Lopressor)  50 mg PO Q12 [facility]


   Last Admin: 09/15/17 08:28 Dose:  50 mg


Quetiapine Fumarate (Seroquel)  25 mg PO [facility]


   Last Admin: 09/14/17 21:24 Dose:  25 mg











- Labs


Labs: 


 





 07/13/17 04:20 





 07/13/17 04:20 





 











PT  11.2 SECONDS (9.4-12.0)   12/14/15  05:20    


 


INR  1.05  (0.89-1.20)   12/14/15  05:20    


 


APTT  36.2 SECONDS (23.1-32.0)  H  12/14/15  05:20    














- Constitutional


Appears: Well, Non-toxic, No Acute Distress





- Head Exam


Head Exam: ATRAUMATIC, NORMAL INSPECTION, NORMOCEPHALIC





- Eye Exam


Eye Exam: EOMI, Normal appearance, PERRL


Pupil Exam: NORMAL ACCOMODATION, PERRL





- ENT Exam


ENT Exam: Mucous Membranes Moist, Normal Exam





- Neck Exam


Neck Exam: Full ROM, Normal Inspection.  absent: Lymphadenopathy





- Respiratory Exam


Respiratory Exam: Clear to Ausculation Bilateral, NORMAL BREATHING PATTERN





- Cardiovascular Exam


Cardiovascular Exam: REGULAR RHYTHM, RRR, +S1, +S2.  absent: Murmur





- GI/Abdominal Exam


GI & Abdominal Exam: Soft, Normal Bowel Sounds.  absent: Tenderness





- Extremities Exam


Extremities Exam: Full ROM, Normal Capillary Refill, Normal Inspection.  absent

: Joint Swelling, Pedal Edema





- Back Exam


Back Exam: NORMAL INSPECTION





- Neurological Exam


Neurological Exam: Alert, Awake, CN II-XII Intact, Normal Gait, Oriented x3





- Psychiatric Exam


Psychiatric exam: Normal Affect, Normal Mood





- Skin


Skin Exam: Dry, Intact, Normal Color, Warm





Assessment and Plan


(1) DVT prophylaxis


Status: Chronic   





(2) Scrotal edema


Status: Chronic   





(3) CAD (coronary artery disease)


Status: Chronic   





(4) Smoking


Status: Chronic   





(5) Low back pain


Status: Chronic   





(6) Prediabetes


Status: Chronic   





(7) Neurocognitive disorder


Status: Chronic

## 2017-09-16 RX ADMIN — DIVALPROEX SODIUM SCH MG: 500 TABLET, DELAYED RELEASE ORAL at 09:26

## 2017-09-16 RX ADMIN — DIVALPROEX SODIUM SCH MG: 500 TABLET, DELAYED RELEASE ORAL at 17:04

## 2017-09-16 NOTE — CP.PCM.PN
Subjective





- Date & Time of Evaluation


Date of Evaluation: 09/16/17


Time of Evaluation: 08:20





- Subjective


Subjective: 





s/o no f/c, n/v/d. no other complaints. calmer


a/p cont plan, cont placement, klonpin prn, cont seroquel





Objective





- Vital Signs/Intake and Output


Vital Signs (last 24 hours): 


 











Temp Pulse Resp BP Pulse Ox


 


 97.7 F   62   19   100/68   97 


 


 09/16/17 08:07  09/16/17 08:07  09/16/17 08:07  09/16/17 08:07  09/16/17 08:07











- Medications


Medications: 


 Current Medications





Atorvastatin Calcium (Lipitor)  20 mg PO [facility]


   Last Admin: 09/15/17 21:45 Dose:  20 mg


Clonazepam (Klonopin)  0.5 mg PO Q12 PRN


   PRN Reason: Agitation


   Last Admin: 08/16/17 09:28 Dose:  0.5 mg


Divalproex Sodium (Depakote Dr(*Bid*))  500 mg PO BID [facility]


   Last Admin: 09/15/17 16:05 Dose:  500 mg


Enalapril Maleate (Vasotec)  10 mg PO DAILY [facility]


   Last Admin: 09/15/17 08:28 Dose:  10 mg


Fenofibrate (Tricor)  145 mg PO DAILY [facility]


   Last Admin: 09/15/17 08:28 Dose:  145 mg


Metoprolol Tartrate (Lopressor)  50 mg PO Q12 [facility]


   Last Admin: 09/15/17 21:45 Dose:  50 mg


Quetiapine Fumarate (Seroquel)  25 mg PO [facility]


   Last Admin: 09/15/17 21:45 Dose:  25 mg











- Labs


Labs: 


 





 07/13/17 04:20 





 07/13/17 04:20 





 











PT  11.2 SECONDS (9.4-12.0)   12/14/15  05:20    


 


INR  1.05  (0.89-1.20)   12/14/15  05:20    


 


APTT  36.2 SECONDS (23.1-32.0)  H  12/14/15  05:20    














- Constitutional


Appears: Well, Non-toxic, No Acute Distress





- Head Exam


Head Exam: ATRAUMATIC, NORMAL INSPECTION, NORMOCEPHALIC





- Eye Exam


Eye Exam: EOMI, Normal appearance, PERRL


Pupil Exam: NORMAL ACCOMODATION, PERRL





- ENT Exam


ENT Exam: Mucous Membranes Moist, Normal Exam





- Neck Exam


Neck Exam: Full ROM, Normal Inspection.  absent: Lymphadenopathy





- Respiratory Exam


Respiratory Exam: Clear to Ausculation Bilateral, NORMAL BREATHING PATTERN





- Cardiovascular Exam


Cardiovascular Exam: REGULAR RHYTHM, RRR, +S1, +S2.  absent: Murmur





- GI/Abdominal Exam


GI & Abdominal Exam: Soft, Normal Bowel Sounds.  absent: Tenderness





- Extremities Exam


Extremities Exam: Full ROM, Normal Capillary Refill, Normal Inspection.  absent

: Joint Swelling, Pedal Edema





- Back Exam


Back Exam: NORMAL INSPECTION





- Neurological Exam


Neurological Exam: Alert, Awake, CN II-XII Intact, Normal Gait, Oriented x3





- Psychiatric Exam


Psychiatric exam: Normal Affect, Normal Mood





- Skin


Skin Exam: Dry, Intact, Normal Color, Warm





Assessment and Plan


(1) DVT prophylaxis


Status: Chronic   





(2) Scrotal edema


Status: Chronic   





(3) CAD (coronary artery disease)


Status: Chronic   





(4) Smoking


Status: Chronic   





(5) Low back pain


Status: Chronic   





(6) Prediabetes


Status: Chronic   





(7) Neurocognitive disorder


Status: Chronic

## 2017-09-17 RX ADMIN — DIVALPROEX SODIUM SCH MG: 500 TABLET, DELAYED RELEASE ORAL at 08:20

## 2017-09-17 RX ADMIN — DIVALPROEX SODIUM SCH MG: 500 TABLET, DELAYED RELEASE ORAL at 16:09

## 2017-09-17 NOTE — CP.PCM.PN
Subjective





- Date & Time of Evaluation


Date of Evaluation: 09/17/17


Time of Evaluation: 11:51





- Subjective


Subjective: 





s/o no f/c, n/v/d. no other complaints. calmer


a/p cont plan, cont placement, klonpin prn, cont seroquel








Objective





- Vital Signs/Intake and Output


Vital Signs (last 24 hours): 


 











Temp Pulse Resp BP Pulse Ox


 


 97.8 F   65   17   107/71   94 L


 


 09/17/17 07:54  09/17/17 08:20  09/17/17 07:54  09/17/17 07:54  09/17/17 07:54











- Medications


Medications: 


 Current Medications





Atorvastatin Calcium (Lipitor)  20 mg PO HS [facility]


   Last Admin: 09/16/17 22:12 Dose:  20 mg


Clonazepam (Klonopin)  0.5 mg PO Q12 PRN


   PRN Reason: Agitation


   Last Admin: 08/16/17 09:28 Dose:  0.5 mg


Divalproex Sodium (Depakote Dr(*Bid*))  500 mg PO BID [facility]


   Last Admin: 09/17/17 08:20 Dose:  500 mg


Enalapril Maleate (Vasotec)  10 mg PO DAILY [facility]


   Last Admin: 09/17/17 08:20 Dose:  10 mg


Fenofibrate (Tricor)  145 mg PO DAILY [facility]


   Last Admin: 09/17/17 08:20 Dose:  145 mg


Metoprolol Tartrate (Lopressor)  50 mg PO Q12 [facility]


   Last Admin: 09/17/17 08:20 Dose:  50 mg


Quetiapine Fumarate (Seroquel)  25 mg PO [facility]


   Last Admin: 09/16/17 22:13 Dose:  25 mg











- Labs


Labs: 


 





 07/13/17 04:20 





 07/13/17 04:20 





 











PT  11.2 SECONDS (9.4-12.0)   12/14/15  05:20    


 


INR  1.05  (0.89-1.20)   12/14/15  05:20    


 


APTT  36.2 SECONDS (23.1-32.0)  H  12/14/15  05:20    














- Constitutional


Appears: Well, Non-toxic, No Acute Distress





- Head Exam


Head Exam: ATRAUMATIC, NORMAL INSPECTION, NORMOCEPHALIC





- Eye Exam


Eye Exam: EOMI, Normal appearance, PERRL


Pupil Exam: NORMAL ACCOMODATION, PERRL





- ENT Exam


ENT Exam: Mucous Membranes Moist, Normal Exam





- Neck Exam


Neck Exam: Full ROM, Normal Inspection.  absent: Lymphadenopathy





- Respiratory Exam


Respiratory Exam: Clear to Ausculation Bilateral, NORMAL BREATHING PATTERN





- Cardiovascular Exam


Cardiovascular Exam: REGULAR RHYTHM, RRR, +S1, +S2.  absent: Murmur





- GI/Abdominal Exam


GI & Abdominal Exam: Soft, Normal Bowel Sounds.  absent: Tenderness





- Extremities Exam


Extremities Exam: Full ROM, Normal Capillary Refill, Normal Inspection.  absent

: Joint Swelling, Pedal Edema





- Back Exam


Back Exam: NORMAL INSPECTION





- Neurological Exam


Neurological Exam: Alert, Awake, CN II-XII Intact, Normal Gait, Oriented x3





- Psychiatric Exam


Psychiatric exam: Normal Affect, Normal Mood





- Skin


Skin Exam: Dry, Intact, Normal Color, Warm





Assessment and Plan


(1) DVT prophylaxis


Status: Chronic   





(2) Scrotal edema


Status: Chronic   





(3) CAD (coronary artery disease)


Status: Chronic   





(4) Smoking


Status: Chronic   





(5) Low back pain


Status: Chronic   





(6) Prediabetes


Status: Chronic   





(7) Neurocognitive disorder


Status: Chronic

## 2017-09-18 RX ADMIN — DIVALPROEX SODIUM SCH MG: 500 TABLET, DELAYED RELEASE ORAL at 08:19

## 2017-09-18 RX ADMIN — DIVALPROEX SODIUM SCH MG: 500 TABLET, DELAYED RELEASE ORAL at 16:47

## 2017-09-18 NOTE — CP.PCM.PN
Subjective





- Date & Time of Evaluation


Date of Evaluation: 09/18/17


Time of Evaluation: 07:24





- Subjective


Subjective: 





s/o no f/c, n/v/d. no other complaints. calmer


a/p cont plan, cont placement, klonpin prn, cont seroquel





Objective





- Vital Signs/Intake and Output


Vital Signs (last 24 hours): 


 











Temp Pulse Resp BP Pulse Ox


 


 97.7 F   64   19   106/72   95 


 


 09/18/17 00:26  09/18/17 00:26  09/18/17 00:26  09/18/17 00:26  09/18/17 00:26











- Medications


Medications: 


 Current Medications





Atorvastatin Calcium (Lipitor)  20 mg PO [facility]


   Last Admin: 09/17/17 21:34 Dose:  20 mg


Clonazepam (Klonopin)  0.5 mg PO Q12 PRN


   PRN Reason: Agitation


   Last Admin: 08/16/17 09:28 Dose:  0.5 mg


Divalproex Sodium (Depakote Dr(*Bid*))  500 mg PO BID [facility]


   Last Admin: 09/17/17 16:09 Dose:  500 mg


Enalapril Maleate (Vasotec)  10 mg PO DAILY [facility]


   Last Admin: 09/17/17 08:20 Dose:  10 mg


Fenofibrate (Tricor)  145 mg PO DAILY [facility]


   Last Admin: 09/17/17 08:20 Dose:  145 mg


Metoprolol Tartrate (Lopressor)  50 mg PO Q12 [facility]


   Last Admin: 09/17/17 21:34 Dose:  50 mg


Quetiapine Fumarate (Seroquel)  25 mg PO [facility]


   Last Admin: 09/17/17 21:34 Dose:  25 mg











- Labs


Labs: 


 





 07/13/17 04:20 





 07/13/17 04:20 





 











PT  11.2 SECONDS (9.4-12.0)   12/14/15  05:20    


 


INR  1.05  (0.89-1.20)   12/14/15  05:20    


 


APTT  36.2 SECONDS (23.1-32.0)  H  12/14/15  05:20    














- Constitutional


Appears: Well, Non-toxic, No Acute Distress





- Head Exam


Head Exam: ATRAUMATIC, NORMAL INSPECTION, NORMOCEPHALIC





- Eye Exam


Eye Exam: EOMI, Normal appearance, PERRL


Pupil Exam: NORMAL ACCOMODATION, PERRL





- ENT Exam


ENT Exam: Mucous Membranes Moist, Normal Exam





- Neck Exam


Neck Exam: Full ROM, Normal Inspection.  absent: Lymphadenopathy





- Respiratory Exam


Respiratory Exam: Clear to Ausculation Bilateral, NORMAL BREATHING PATTERN





- Cardiovascular Exam


Cardiovascular Exam: REGULAR RHYTHM, RRR, +S1, +S2.  absent: Murmur





- GI/Abdominal Exam


GI & Abdominal Exam: Soft, Normal Bowel Sounds.  absent: Tenderness





- Extremities Exam


Extremities Exam: Full ROM, Normal Capillary Refill, Normal Inspection.  absent

: Joint Swelling, Pedal Edema





- Back Exam


Back Exam: NORMAL INSPECTION





- Neurological Exam


Neurological Exam: Alert, Awake, CN II-XII Intact, Normal Gait, Oriented x3





- Psychiatric Exam


Psychiatric exam: Normal Affect, Normal Mood





- Skin


Skin Exam: Dry, Intact, Normal Color, Warm





Assessment and Plan


(1) DVT prophylaxis


Status: Chronic   





(2) Scrotal edema


Status: Chronic   





(3) CAD (coronary artery disease)


Status: Chronic   





(4) Smoking


Status: Chronic   





(5) Low back pain


Status: Chronic   





(6) Prediabetes


Status: Chronic   





(7) Neurocognitive disorder


Status: Chronic

## 2017-09-19 RX ADMIN — DIVALPROEX SODIUM SCH MG: 500 TABLET, DELAYED RELEASE ORAL at 16:20

## 2017-09-19 RX ADMIN — DIVALPROEX SODIUM SCH MG: 500 TABLET, DELAYED RELEASE ORAL at 09:18

## 2017-09-19 NOTE — CP.PCM.PN
Subjective





- Date & Time of Evaluation


Date of Evaluation: 09/19/17


Time of Evaluation: 06:48





- Subjective


Subjective: 





s/o no f/c, n/v/d. no other complaints. calmer


a/p cont plan, cont placement, klonpin prn, cont seroquel





Objective





- Vital Signs/Intake and Output


Vital Signs (last 24 hours): 


 











Temp Pulse Resp BP Pulse Ox


 


 98.3 F   69   20   106/70   99 


 


 09/19/17 00:43  09/19/17 00:43  09/19/17 00:43  09/19/17 00:43  09/19/17 00:43











- Medications


Medications: 


 Current Medications





Atorvastatin Calcium (Lipitor)  20 mg PO [facility]


   Last Admin: 09/18/17 21:06 Dose:  20 mg


Clonazepam (Klonopin)  0.5 mg PO Q12 PRN


   PRN Reason: Agitation


   Last Admin: 08/16/17 09:28 Dose:  0.5 mg


Divalproex Sodium (Depakote Dr(*Bid*))  500 mg PO BID [facility]


   Last Admin: 09/18/17 16:47 Dose:  500 mg


Enalapril Maleate (Vasotec)  10 mg PO DAILY [facility]


   Last Admin: 09/18/17 08:19 Dose:  10 mg


Fenofibrate (Tricor)  145 mg PO DAILY [facility]


   Last Admin: 09/18/17 08:19 Dose:  145 mg


Metoprolol Tartrate (Lopressor)  50 mg PO Q12 [facility]


   Last Admin: 09/18/17 20:30 Dose:  50 mg


Quetiapine Fumarate (Seroquel)  25 mg PO [facility]


   Last Admin: 09/18/17 21:06 Dose:  25 mg











- Labs


Labs: 


 





 07/13/17 04:20 





 07/13/17 04:20 





 











PT  11.2 SECONDS (9.4-12.0)   12/14/15  05:20    


 


INR  1.05  (0.89-1.20)   12/14/15  05:20    


 


APTT  36.2 SECONDS (23.1-32.0)  H  12/14/15  05:20    














- Constitutional


Appears: Well, Non-toxic, No Acute Distress





- Head Exam


Head Exam: ATRAUMATIC, NORMAL INSPECTION, NORMOCEPHALIC





- Eye Exam


Eye Exam: EOMI, Normal appearance, PERRL


Pupil Exam: NORMAL ACCOMODATION, PERRL





- ENT Exam


ENT Exam: Mucous Membranes Moist, Normal Exam





- Neck Exam


Neck Exam: Full ROM, Normal Inspection.  absent: Lymphadenopathy





- Respiratory Exam


Respiratory Exam: Clear to Ausculation Bilateral, NORMAL BREATHING PATTERN





- Cardiovascular Exam


Cardiovascular Exam: REGULAR RHYTHM, RRR, +S1, +S2.  absent: Murmur





- GI/Abdominal Exam


GI & Abdominal Exam: Soft, Normal Bowel Sounds.  absent: Tenderness





- Extremities Exam


Extremities Exam: Full ROM, Normal Capillary Refill, Normal Inspection.  absent

: Joint Swelling, Pedal Edema





- Back Exam


Back Exam: NORMAL INSPECTION





- Neurological Exam


Neurological Exam: Alert, Awake, CN II-XII Intact, Normal Gait, Oriented x3





- Psychiatric Exam


Psychiatric exam: Normal Affect, Normal Mood





- Skin


Skin Exam: Dry, Intact, Normal Color, Warm





Assessment and Plan


(1) DVT prophylaxis


Status: Chronic   





(2) Scrotal edema


Status: Chronic   





(3) CAD (coronary artery disease)


Status: Chronic   





(4) Smoking


Status: Chronic   





(5) Low back pain


Status: Chronic   





(6) Prediabetes


Status: Chronic   





(7) Neurocognitive disorder


Status: Chronic

## 2017-09-20 RX ADMIN — DIVALPROEX SODIUM SCH MG: 500 TABLET, DELAYED RELEASE ORAL at 17:37

## 2017-09-20 RX ADMIN — DIVALPROEX SODIUM SCH MG: 500 TABLET, DELAYED RELEASE ORAL at 09:10

## 2017-09-20 NOTE — CP.PCM.PN
Subjective





- Date & Time of Evaluation


Date of Evaluation: 09/20/17


Time of Evaluation: 08:37





- Subjective


Subjective: 





s/o no f/c, n/v/d. no other complaints.calm and cooperative.


a/p cont plan, cont placement, cont all medical management





Objective





- Vital Signs/Intake and Output


Vital Signs (last 24 hours): 


 











Temp Pulse Resp BP Pulse Ox


 


 97.1 F L  56 L  20   114/74   98 


 


 09/20/17 07:56  09/20/17 07:56  09/20/17 07:56  09/20/17 07:56  09/20/17 07:56











- Medications


Medications: 


 Current Medications





Atorvastatin Calcium (Lipitor)  20 mg PO [facility]


   Last Admin: 09/19/17 21:00 Dose:  20 mg


Divalproex Sodium (Depakote Dr(*Bid*))  500 mg PO BID [facility]


   Last Admin: 09/19/17 16:20 Dose:  500 mg


Enalapril Maleate (Vasotec)  10 mg PO DAILY [facility]


   Last Admin: 09/19/17 09:19 Dose:  10 mg


Fenofibrate (Tricor)  145 mg PO DAILY [facility]


   Last Admin: 09/19/17 09:19 Dose:  145 mg


Metoprolol Tartrate (Lopressor)  50 mg PO Q12 [facility]


   Last Admin: 09/19/17 21:00 Dose:  50 mg


Quetiapine Fumarate (Seroquel)  25 mg PO [facility]


   Last Admin: 09/19/17 21:19 Dose:  25 mg











- Labs


Labs: 


 





 07/13/17 04:20 





 07/13/17 04:20 





 











PT  11.2 SECONDS (9.4-12.0)   12/14/15  05:20    


 


INR  1.05  (0.89-1.20)   12/14/15  05:20    


 


APTT  36.2 SECONDS (23.1-32.0)  H  12/14/15  05:20    














- Constitutional


Appears: Well, Non-toxic, No Acute Distress





- Head Exam


Head Exam: ATRAUMATIC, NORMAL INSPECTION, NORMOCEPHALIC





- Eye Exam


Eye Exam: EOMI, Normal appearance, PERRL


Pupil Exam: NORMAL ACCOMODATION, PERRL





- ENT Exam


ENT Exam: Mucous Membranes Moist, Normal Exam





- Neck Exam


Neck Exam: Full ROM, Normal Inspection.  absent: Lymphadenopathy





- Respiratory Exam


Respiratory Exam: Clear to Ausculation Bilateral, NORMAL BREATHING PATTERN





- Cardiovascular Exam


Cardiovascular Exam: REGULAR RHYTHM, RRR, +S1, +S2.  absent: Murmur





- GI/Abdominal Exam


GI & Abdominal Exam: Soft, Normal Bowel Sounds.  absent: Tenderness





- Extremities Exam


Extremities Exam: Full ROM, Normal Capillary Refill, Normal Inspection.  absent

: Joint Swelling, Pedal Edema





- Back Exam


Back Exam: NORMAL INSPECTION





- Neurological Exam


Neurological Exam: Alert, Awake, CN II-XII Intact, Normal Gait, Oriented x3





- Psychiatric Exam


Psychiatric exam: Normal Affect, Normal Mood





- Skin


Skin Exam: Dry, Intact, Normal Color, Warm





Assessment and Plan


(1) DVT prophylaxis


Status: Chronic   





(2) Scrotal edema


Status: Chronic   





(3) CAD (coronary artery disease)


Status: Chronic   





(4) Smoking


Status: Chronic   





(5) Low back pain


Status: Chronic   





(6) Prediabetes


Status: Chronic   





(7) Neurocognitive disorder


Status: Chronic

## 2017-09-21 RX ADMIN — DIVALPROEX SODIUM SCH MG: 500 TABLET, DELAYED RELEASE ORAL at 08:45

## 2017-09-21 RX ADMIN — DIVALPROEX SODIUM SCH MG: 500 TABLET, DELAYED RELEASE ORAL at 16:13

## 2017-09-22 RX ADMIN — DIVALPROEX SODIUM SCH MG: 500 TABLET, DELAYED RELEASE ORAL at 09:37

## 2017-09-22 RX ADMIN — DIVALPROEX SODIUM SCH MG: 500 TABLET, DELAYED RELEASE ORAL at 16:52

## 2017-09-22 NOTE — CP.PCM.PN
Subjective





- Date & Time of Evaluation


Date of Evaluation: 09/22/17


Time of Evaluation: 09:18





- Subjective


Subjective: 





s/o no f/c, n/v/d. no other complaints.calm and cooperative.


a/p cont plan, cont placement, cont all medical management





Objective





- Vital Signs/Intake and Output


Vital Signs (last 24 hours): 


 











Temp Pulse Resp BP Pulse Ox


 


 97.6 F   62   18   102/76   97 


 


 09/22/17 07:49  09/22/17 07:49  09/22/17 07:49  09/22/17 07:49  09/22/17 07:49











- Medications


Medications: 


 Current Medications





Atorvastatin Calcium (Lipitor)  20 mg PO HS [facility]


   Last Admin: 09/21/17 21:26 Dose:  20 mg


Divalproex Sodium (Depakote Dr(*Bid*))  500 mg PO BID [facility]


   Last Admin: 09/21/17 16:13 Dose:  500 mg


Enalapril Maleate (Vasotec)  10 mg PO DAILY [facility]


   Last Admin: 09/21/17 08:45 Dose:  10 mg


Fenofibrate (Tricor)  145 mg PO DAILY [facility]


   Last Admin: 09/21/17 08:45 Dose:  145 mg


Metoprolol Tartrate (Lopressor)  50 mg PO Q12 [facility]


   Last Admin: 09/21/17 21:25 Dose:  50 mg


Quetiapine Fumarate (Seroquel)  25 mg PO HS [facility]


   Last Admin: 09/21/17 21:26 Dose:  25 mg











- Labs


Labs: 


 





 07/13/17 04:20 





 07/13/17 04:20 





 











PT  11.2 SECONDS (9.4-12.0)   12/14/15  05:20    


 


INR  1.05  (0.89-1.20)   12/14/15  05:20    


 


APTT  36.2 SECONDS (23.1-32.0)  H  12/14/15  05:20    














- Constitutional


Appears: Well, Non-toxic, No Acute Distress





- Head Exam


Head Exam: ATRAUMATIC, NORMAL INSPECTION, NORMOCEPHALIC





- Eye Exam


Eye Exam: EOMI, Normal appearance, PERRL


Pupil Exam: NORMAL ACCOMODATION, PERRL





- ENT Exam


ENT Exam: Mucous Membranes Moist, Normal Exam





- Neck Exam


Neck Exam: Full ROM, Normal Inspection.  absent: Lymphadenopathy





- Respiratory Exam


Respiratory Exam: Clear to Ausculation Bilateral, NORMAL BREATHING PATTERN





- Cardiovascular Exam


Cardiovascular Exam: REGULAR RHYTHM, +S1, +S2.  absent: Murmur





- GI/Abdominal Exam


GI & Abdominal Exam: Soft, Normal Bowel Sounds.  absent: Tenderness





- Extremities Exam


Extremities Exam: Full ROM, Normal Capillary Refill, Normal Inspection.  absent

: Joint Swelling, Pedal Edema





- Back Exam


Back Exam: NORMAL INSPECTION





- Neurological Exam


Neurological Exam: Alert, Awake, CN II-XII Intact, Normal Gait, Oriented x3





- Psychiatric Exam


Psychiatric exam: Normal Affect, Normal Mood





- Skin


Skin Exam: Dry, Intact, Normal Color, Warm





Assessment and Plan


(1) DVT prophylaxis


Status: Chronic   





(2) Scrotal edema


Status: Chronic   





(3) CAD (coronary artery disease)


Status: Chronic   





(4) Smoking


Status: Chronic   





(5) Low back pain


Status: Chronic   





(6) Prediabetes


Status: Chronic   





(7) Neurocognitive disorder


Status: Chronic

## 2017-09-22 NOTE — CP.PCM.PN
Subjective





- Date & Time of Evaluation


Date of Evaluation: 09/21/17


Time of Evaluation: 09:19





- Subjective


Subjective: 





s/o no f/c, n/v/d. no other complaints.calm and cooperative.


a/p cont plan, cont placement, cont all medical management





Objective





- Vital Signs/Intake and Output


Vital Signs (last 24 hours): 


 











Temp Pulse Resp BP Pulse Ox


 


 97.6 F   62   18   102/76   97 


 


 09/22/17 07:49  09/22/17 07:49  09/22/17 07:49  09/22/17 07:49  09/22/17 07:49











- Medications


Medications: 


 Current Medications





Atorvastatin Calcium (Lipitor)  20 mg PO HS [facility]


   Last Admin: 09/21/17 21:26 Dose:  20 mg


Divalproex Sodium (Depakote Dr(*Bid*))  500 mg PO BID [facility]


   Last Admin: 09/21/17 16:13 Dose:  500 mg


Enalapril Maleate (Vasotec)  10 mg PO DAILY [facility]


   Last Admin: 09/21/17 08:45 Dose:  10 mg


Fenofibrate (Tricor)  145 mg PO DAILY [facility]


   Last Admin: 09/21/17 08:45 Dose:  145 mg


Metoprolol Tartrate (Lopressor)  50 mg PO Q12 [facility]


   Last Admin: 09/21/17 21:25 Dose:  50 mg


Quetiapine Fumarate (Seroquel)  25 mg PO HS [facility]


   Last Admin: 09/21/17 21:26 Dose:  25 mg











- Labs


Labs: 


 





 07/13/17 04:20 





 07/13/17 04:20 





 











PT  11.2 SECONDS (9.4-12.0)   12/14/15  05:20    


 


INR  1.05  (0.89-1.20)   12/14/15  05:20    


 


APTT  36.2 SECONDS (23.1-32.0)  H  12/14/15  05:20    














Assessment and Plan


(1) DVT prophylaxis


Status: Chronic   





(2) Scrotal edema


Status: Chronic   





(3) CAD (coronary artery disease)


Status: Chronic   





(4) Smoking


Status: Chronic   





(5) Low back pain


Status: Chronic   





(6) Prediabetes


Status: Chronic   





(7) Neurocognitive disorder


Status: Chronic

## 2017-09-23 RX ADMIN — DIVALPROEX SODIUM SCH MG: 500 TABLET, DELAYED RELEASE ORAL at 09:36

## 2017-09-23 RX ADMIN — DIVALPROEX SODIUM SCH MG: 500 TABLET, DELAYED RELEASE ORAL at 16:51

## 2017-09-23 NOTE — CP.PCM.PN
Subjective





- Date & Time of Evaluation


Date of Evaluation: 09/23/17


Time of Evaluation: 09:24





- Subjective


Subjective: 





s/o no f/c, n/v/d. no other complaints.calm and cooperative.


a/p cont plan, cont placement, cont all medical managemen





Objective





- Vital Signs/Intake and Output


Vital Signs (last 24 hours): 


 











Temp Pulse Resp BP Pulse Ox


 


 97.1 F L  65   20   105/73   98 


 


 09/23/17 08:46  09/23/17 08:46  09/23/17 08:46  09/23/17 08:46  09/23/17 08:46











- Medications


Medications: 


 Current Medications





Atorvastatin Calcium (Lipitor)  20 mg PO [facility]


   Last Admin: 09/22/17 21:11 Dose:  20 mg


Divalproex Sodium (Depakote Dr(*Bid*))  500 mg PO BID [facility]


   Last Admin: 09/22/17 16:52 Dose:  500 mg


Enalapril Maleate (Vasotec)  10 mg PO DAILY [facility]


   Last Admin: 09/22/17 09:38 Dose:  10 mg


Fenofibrate (Tricor)  145 mg PO DAILY [facility]


   Last Admin: 09/22/17 09:38 Dose:  145 mg


Metoprolol Tartrate (Lopressor)  50 mg PO Q12 [facility]


   Last Admin: 09/22/17 21:10 Dose:  50 mg


Quetiapine Fumarate (Seroquel)  25 mg PO [facility]


   Last Admin: 09/22/17 21:11 Dose:  25 mg











- Labs


Labs: 


 





 07/13/17 04:20 





 07/13/17 04:20 





 











PT  11.2 SECONDS (9.4-12.0)   12/14/15  05:20    


 


INR  1.05  (0.89-1.20)   12/14/15  05:20    


 


APTT  36.2 SECONDS (23.1-32.0)  H  12/14/15  05:20    














- Constitutional


Appears: Well, Non-toxic, No Acute Distress





- Head Exam


Head Exam: ATRAUMATIC, NORMAL INSPECTION, NORMOCEPHALIC





- Eye Exam


Eye Exam: EOMI, Normal appearance, PERRL


Pupil Exam: NORMAL ACCOMODATION, PERRL





- ENT Exam


ENT Exam: Mucous Membranes Moist, Normal Exam





- Neck Exam


Neck Exam: Full ROM, Normal Inspection.  absent: Lymphadenopathy





- Respiratory Exam


Respiratory Exam: Clear to Ausculation Bilateral, NORMAL BREATHING PATTERN





- Cardiovascular Exam


Cardiovascular Exam: REGULAR RHYTHM, RRR, +S1, +S2.  absent: Murmur





- GI/Abdominal Exam


GI & Abdominal Exam: Soft, Normal Bowel Sounds.  absent: Tenderness





- Extremities Exam


Extremities Exam: Full ROM, Normal Capillary Refill, Normal Inspection.  absent

: Joint Swelling, Pedal Edema





- Back Exam


Back Exam: NORMAL INSPECTION





- Neurological Exam


Neurological Exam: Alert, Awake, CN II-XII Intact, Normal Gait, Oriented x3





- Psychiatric Exam


Psychiatric exam: Normal Affect, Normal Mood





- Skin


Skin Exam: Dry, Intact, Normal Color, Warm





Assessment and Plan


(1) DVT prophylaxis


Status: Chronic   





(2) Scrotal edema


Status: Chronic   





(3) CAD (coronary artery disease)


Status: Chronic   





(4) Smoking


Status: Chronic   





(5) Low back pain


Status: Chronic   





(6) Prediabetes


Status: Chronic   





(7) Neurocognitive disorder


Status: Chronic

## 2017-09-24 RX ADMIN — DIVALPROEX SODIUM SCH MG: 500 TABLET, DELAYED RELEASE ORAL at 17:02

## 2017-09-24 RX ADMIN — DIVALPROEX SODIUM SCH MG: 500 TABLET, DELAYED RELEASE ORAL at 08:52

## 2017-09-24 NOTE — CP.PCM.PN
Subjective





- Date & Time of Evaluation


Date of Evaluation: 09/24/17


Time of Evaluation: 09:52





- Subjective


Subjective: 





s/o no f/c, n/v/d. no other complaints.calm and cooperative.


a/p cont plan, cont placement, cont all medical management





Objective





- Vital Signs/Intake and Output


Vital Signs (last 24 hours): 


 











Temp Pulse Resp BP Pulse Ox


 


 97.4 F L  61   20   93/67 L  96 


 


 09/24/17 08:11  09/24/17 08:52  09/24/17 08:11  09/24/17 08:52  09/24/17 08:11











- Medications


Medications: 


 Current Medications





Atorvastatin Calcium (Lipitor)  20 mg PO [facility]


   Last Admin: 09/23/17 21:24 Dose:  20 mg


Divalproex Sodium (Depakote Dr(*Bid*))  500 mg PO BID [facility]


   Last Admin: 09/24/17 08:52 Dose:  500 mg


Enalapril Maleate (Vasotec)  10 mg PO DAILY [facility]


   Last Admin: 09/24/17 08:52 Dose:  Not Given


Fenofibrate (Tricor)  145 mg PO DAILY [facility]


   Last Admin: 09/24/17 08:51 Dose:  145 mg


Metoprolol Tartrate (Lopressor)  50 mg PO Q12 [facility]


   Last Admin: 09/24/17 08:52 Dose:  Not Given


Quetiapine Fumarate (Seroquel)  25 mg PO [facility]


   Last Admin: 09/23/17 21:24 Dose:  25 mg











- Labs


Labs: 


 





 07/13/17 04:20 





 07/13/17 04:20 





 











PT  11.2 SECONDS (9.4-12.0)   12/14/15  05:20    


 


INR  1.05  (0.89-1.20)   12/14/15  05:20    


 


APTT  36.2 SECONDS (23.1-32.0)  H  12/14/15  05:20    














- Constitutional


Appears: Well, Non-toxic, No Acute Distress





- Head Exam


Head Exam: ATRAUMATIC, NORMAL INSPECTION, NORMOCEPHALIC





- Eye Exam


Eye Exam: EOMI, Normal appearance, PERRL


Pupil Exam: NORMAL ACCOMODATION, PERRL





- ENT Exam


ENT Exam: Mucous Membranes Moist, Normal Exam





- Neck Exam


Neck Exam: Full ROM, Normal Inspection.  absent: Lymphadenopathy





- Respiratory Exam


Respiratory Exam: Clear to Ausculation Bilateral, NORMAL BREATHING PATTERN





- Cardiovascular Exam


Cardiovascular Exam: REGULAR RHYTHM, RRR, +S1, +S2.  absent: Murmur





- GI/Abdominal Exam


GI & Abdominal Exam: Soft, Normal Bowel Sounds.  absent: Tenderness





- Extremities Exam


Extremities Exam: Full ROM, Normal Capillary Refill, Normal Inspection.  absent

: Joint Swelling, Pedal Edema





- Back Exam


Back Exam: NORMAL INSPECTION





- Neurological Exam


Neurological Exam: Alert, Awake, CN II-XII Intact, Normal Gait, Oriented x3





- Psychiatric Exam


Psychiatric exam: Normal Affect, Normal Mood





- Skin


Skin Exam: Dry, Intact, Normal Color, Warm





Assessment and Plan


(1) DVT prophylaxis


Status: Chronic   





(2) Scrotal edema


Status: Chronic   





(3) CAD (coronary artery disease)


Status: Chronic   





(4) Smoking


Status: Chronic   





(5) Low back pain


Status: Chronic   





(6) Prediabetes


Status: Chronic   





(7) Neurocognitive disorder


Status: Chronic

## 2017-09-25 RX ADMIN — DIVALPROEX SODIUM SCH MG: 500 TABLET, DELAYED RELEASE ORAL at 09:28

## 2017-09-25 RX ADMIN — DIVALPROEX SODIUM SCH MG: 500 TABLET, DELAYED RELEASE ORAL at 16:21

## 2017-09-25 NOTE — CP.PCM.PN
Subjective





- Date & Time of Evaluation


Date of Evaluation: 09/25/17


Time of Evaluation: 08:06





- Subjective


Subjective: 





s/o no f/c, n/v/d. no other complaints.calm and cooperative.


a/p cont plan, cont placement, cont all medical management





Objective





- Vital Signs/Intake and Output


Vital Signs (last 24 hours): 


 











Temp Pulse Resp BP Pulse Ox


 


 98.5 F   74   20   110/70   98 


 


 09/25/17 00:16  09/25/17 00:16  09/25/17 00:16  09/25/17 00:16  09/25/17 00:16











- Medications


Medications: 


 Current Medications





Atorvastatin Calcium (Lipitor)  20 mg PO [facility]


   Last Admin: 09/24/17 21:44 Dose:  20 mg


Divalproex Sodium (Depakote Dr(*Bid*))  500 mg PO BID [facility]


   Last Admin: 09/24/17 17:02 Dose:  500 mg


Enalapril Maleate (Vasotec)  10 mg PO DAILY [facility]


   Last Admin: 09/24/17 08:52 Dose:  Not Given


Fenofibrate (Tricor)  145 mg PO DAILY [facility]


   Last Admin: 09/24/17 08:51 Dose:  145 mg


Metoprolol Tartrate (Lopressor)  50 mg PO Q12 [facility]


   Last Admin: 09/24/17 21:44 Dose:  Not Given


Quetiapine Fumarate (Seroquel)  25 mg PO HS [facility]


   Last Admin: 09/24/17 21:44 Dose:  25 mg











- Labs


Labs: 


 





 07/13/17 04:20 





 07/13/17 04:20 





 











PT  11.2 SECONDS (9.4-12.0)   12/14/15  05:20    


 


INR  1.05  (0.89-1.20)   12/14/15  05:20    


 


APTT  36.2 SECONDS (23.1-32.0)  H  12/14/15  05:20    














- Constitutional


Appears: Well, Non-toxic, No Acute Distress





- Head Exam


Head Exam: ATRAUMATIC, NORMAL INSPECTION, NORMOCEPHALIC





- Eye Exam


Eye Exam: EOMI, Normal appearance, PERRL


Pupil Exam: NORMAL ACCOMODATION, PERRL





- ENT Exam


ENT Exam: Mucous Membranes Moist, Normal Exam





- Neck Exam


Neck Exam: Full ROM, Normal Inspection.  absent: Lymphadenopathy





- Respiratory Exam


Respiratory Exam: Clear to Ausculation Bilateral, NORMAL BREATHING PATTERN





- Cardiovascular Exam


Cardiovascular Exam: REGULAR RHYTHM, +S1, +S2.  absent: Murmur





- GI/Abdominal Exam


GI & Abdominal Exam: Soft, Normal Bowel Sounds.  absent: Tenderness





- Extremities Exam


Extremities Exam: Full ROM, Normal Capillary Refill, Normal Inspection.  absent

: Joint Swelling, Pedal Edema





- Back Exam


Back Exam: NORMAL INSPECTION





- Neurological Exam


Neurological Exam: Alert, Awake, CN II-XII Intact, Normal Gait, Oriented x3





- Psychiatric Exam


Psychiatric exam: Normal Affect, Normal Mood





- Skin


Skin Exam: Dry, Intact, Normal Color, Warm





Assessment and Plan


(1) DVT prophylaxis


Status: Chronic   





(2) Scrotal edema


Status: Chronic   





(3) CAD (coronary artery disease)


Status: Chronic   





(4) Smoking


Status: Chronic   





(5) Low back pain


Status: Chronic   





(6) Prediabetes


Status: Chronic   





(7) Neurocognitive disorder


Status: Chronic

## 2017-09-26 RX ADMIN — DIVALPROEX SODIUM SCH MG: 500 TABLET, DELAYED RELEASE ORAL at 09:24

## 2017-09-26 RX ADMIN — DIVALPROEX SODIUM SCH MG: 500 TABLET, DELAYED RELEASE ORAL at 17:34

## 2017-09-26 NOTE — CP.PCM.PN
Subjective





- Date & Time of Evaluation


Date of Evaluation: 09/26/17


Time of Evaluation: 07:19





- Subjective


Subjective: 





s/o no f/c, n/v/d. no other complaints.calm and cooperative.


a/p cont plan, cont placement, cont all medical management





Objective





- Vital Signs/Intake and Output


Vital Signs (last 24 hours): 


 











Temp Pulse Resp BP Pulse Ox


 


 98.3 F   84   19   99/65 L  97 


 


 09/26/17 00:33  09/26/17 00:33  09/26/17 00:33  09/26/17 00:33  09/26/17 00:33











- Medications


Medications: 


 Current Medications





Atorvastatin Calcium (Lipitor)  20 mg PO [facility]


   Last Admin: 09/25/17 21:21 Dose:  20 mg


Divalproex Sodium (Depakote Dr(*Bid*))  500 mg PO BID [facility]


   Last Admin: 09/25/17 16:21 Dose:  500 mg


Enalapril Maleate (Vasotec)  10 mg PO DAILY [facility]


   Last Admin: 09/25/17 09:29 Dose:  10 mg


Fenofibrate (Tricor)  145 mg PO DAILY [facility]


   Last Admin: 09/25/17 09:28 Dose:  145 mg


Metoprolol Tartrate (Lopressor)  50 mg PO Q12 [facility]


   Last Admin: 09/25/17 21:22 Dose:  Not Given


Quetiapine Fumarate (Seroquel)  25 mg PO HS [facility]


   Last Admin: 09/25/17 21:21 Dose:  25 mg











- Labs


Labs: 


 





 07/13/17 04:20 





 07/13/17 04:20 





 











PT  11.2 SECONDS (9.4-12.0)   12/14/15  05:20    


 


INR  1.05  (0.89-1.20)   12/14/15  05:20    


 


APTT  36.2 SECONDS (23.1-32.0)  H  12/14/15  05:20    














- Constitutional


Appears: Well, Non-toxic, No Acute Distress





- Head Exam


Head Exam: ATRAUMATIC, NORMAL INSPECTION, NORMOCEPHALIC





- Eye Exam


Eye Exam: EOMI, Normal appearance, PERRL


Pupil Exam: NORMAL ACCOMODATION, PERRL





- ENT Exam


ENT Exam: Mucous Membranes Moist, Normal Exam





- Neck Exam


Neck Exam: Full ROM, Normal Inspection.  absent: Lymphadenopathy





- Respiratory Exam


Respiratory Exam: Clear to Ausculation Bilateral, NORMAL BREATHING PATTERN





- Cardiovascular Exam


Cardiovascular Exam: REGULAR RHYTHM, RRR, +S1, +S2.  absent: Murmur





- GI/Abdominal Exam


GI & Abdominal Exam: Soft, Normal Bowel Sounds.  absent: Tenderness





- Extremities Exam


Extremities Exam: Full ROM, Normal Capillary Refill, Normal Inspection.  absent

: Joint Swelling, Pedal Edema





- Back Exam


Back Exam: NORMAL INSPECTION





- Neurological Exam


Neurological Exam: Alert, Awake, CN II-XII Intact, Normal Gait, Oriented x3





- Psychiatric Exam


Psychiatric exam: Normal Affect, Normal Mood





- Skin


Skin Exam: Dry, Intact, Normal Color, Warm





Assessment and Plan


(1) DVT prophylaxis


Status: Chronic   





(2) Scrotal edema


Status: Chronic   





(3) CAD (coronary artery disease)


Status: Chronic   





(4) Smoking


Status: Chronic   





(5) Low back pain


Status: Chronic   





(6) Prediabetes


Status: Chronic   





(7) Neurocognitive disorder


Status: Chronic

## 2017-09-27 RX ADMIN — DIVALPROEX SODIUM SCH MG: 500 TABLET, DELAYED RELEASE ORAL at 10:58

## 2017-09-27 RX ADMIN — DIVALPROEX SODIUM SCH MG: 500 TABLET, DELAYED RELEASE ORAL at 17:35

## 2017-09-27 NOTE — CP.PCM.PN
Subjective





- Date & Time of Evaluation


Date of Evaluation: 09/27/17


Time of Evaluation: 09:52





- Subjective


Subjective: 





s/o no f/c, n/v/d. no other complaints.calm and cooperative.


a/p cont plan, cont placement, cont all medical management





Objective





- Vital Signs/Intake and Output


Vital Signs (last 24 hours): 


 











Temp Pulse Resp BP Pulse Ox


 


 97.6 F   66   20   106/71   94 L


 


 09/27/17 07:54  09/27/17 07:54  09/27/17 07:54  09/27/17 07:54  09/27/17 07:54











- Medications


Medications: 


 Current Medications





Atorvastatin Calcium (Lipitor)  20 mg PO [facility]


   Last Admin: 09/26/17 22:33 Dose:  20 mg


Divalproex Sodium (Depakote Dr(*Bid*))  500 mg PO BID [facility]


   Last Admin: 09/26/17 17:34 Dose:  500 mg


Enalapril Maleate (Vasotec)  10 mg PO DAILY [facility]


   Last Admin: 09/26/17 09:25 Dose:  10 mg


Fenofibrate (Tricor)  145 mg PO DAILY [facility]


   Last Admin: 09/26/17 09:24 Dose:  145 mg


Metoprolol Tartrate (Lopressor)  50 mg PO Q12 [facility]


   Last Admin: 09/26/17 22:34 Dose:  Not Given


Quetiapine Fumarate (Seroquel)  25 mg PO HS [facility]


   Last Admin: 09/26/17 22:33 Dose:  25 mg











- Labs


Labs: 


 





 07/13/17 04:20 





 07/13/17 04:20 





 











PT  11.2 SECONDS (9.4-12.0)   12/14/15  05:20    


 


INR  1.05  (0.89-1.20)   12/14/15  05:20    


 


APTT  36.2 SECONDS (23.1-32.0)  H  12/14/15  05:20    














- Constitutional


Appears: Well, Non-toxic, No Acute Distress





- Head Exam


Head Exam: ATRAUMATIC, NORMAL INSPECTION, NORMOCEPHALIC





- Eye Exam


Eye Exam: EOMI, Normal appearance, PERRL


Pupil Exam: NORMAL ACCOMODATION, PERRL





- ENT Exam


ENT Exam: Mucous Membranes Moist, Normal Exam





- Neck Exam


Neck Exam: Full ROM, Normal Inspection.  absent: Lymphadenopathy





- Respiratory Exam


Respiratory Exam: Clear to Ausculation Bilateral, NORMAL BREATHING PATTERN





- Cardiovascular Exam


Cardiovascular Exam: REGULAR RHYTHM, RRR, +S1, +S2.  absent: Murmur





- GI/Abdominal Exam


GI & Abdominal Exam: Soft, Normal Bowel Sounds.  absent: Tenderness





- Extremities Exam


Extremities Exam: Full ROM, Normal Capillary Refill, Normal Inspection.  absent

: Joint Swelling, Pedal Edema





- Back Exam


Back Exam: NORMAL INSPECTION





- Neurological Exam


Neurological Exam: Alert, Awake, CN II-XII Intact, Normal Gait, Oriented x3





- Psychiatric Exam


Psychiatric exam: Normal Affect, Normal Mood





- Skin


Skin Exam: Dry, Intact, Normal Color, Warm





Assessment and Plan


(1) DVT prophylaxis


Status: Chronic   





(2) Scrotal edema


Status: Chronic   





(3) CAD (coronary artery disease)


Status: Chronic   





(4) Smoking


Status: Chronic   





(5) Low back pain


Status: Chronic   





(6) Prediabetes


Status: Chronic   





(7) Neurocognitive disorder


Status: Chronic

## 2017-09-28 RX ADMIN — DIVALPROEX SODIUM SCH MG: 500 TABLET, DELAYED RELEASE ORAL at 16:18

## 2017-09-28 RX ADMIN — DIVALPROEX SODIUM SCH MG: 500 TABLET, DELAYED RELEASE ORAL at 09:03

## 2017-09-28 NOTE — CP.PCM.PN
Subjective





- Date & Time of Evaluation


Date of Evaluation: 09/28/17


Time of Evaluation: 08:04





- Subjective


Subjective: 





s/o no f/c, n/v/d. no other complaints.calm and cooperative.


a/p cont plan, cont placement, cont all medical management





Objective





- Vital Signs/Intake and Output


Vital Signs (last 24 hours): 


 











Temp Pulse Resp BP Pulse Ox


 


 97.4 F L  65   20   100/67   95 


 


 09/28/17 08:04  09/28/17 08:04  09/28/17 08:04  09/28/17 08:04  09/28/17 08:04











- Medications


Medications: 


 Current Medications





Atorvastatin Calcium (Lipitor)  20 mg PO HS [facility]


   Last Admin: 09/27/17 21:14 Dose:  20 mg


Divalproex Sodium (Depakote Dr(*Bid*))  500 mg PO BID [facility]


   Last Admin: 09/27/17 17:35 Dose:  500 mg


Enalapril Maleate (Vasotec)  10 mg PO DAILY [facility]


   Last Admin: 09/27/17 10:58 Dose:  10 mg


Fenofibrate (Tricor)  145 mg PO DAILY [facility]


   Last Admin: 09/27/17 10:58 Dose:  145 mg


Metoprolol Tartrate (Lopressor)  50 mg PO Q12 [facility]


   Last Admin: 09/27/17 21:14 Dose:  50 mg


Quetiapine Fumarate (Seroquel)  25 mg PO HS [facility]


   Last Admin: 09/27/17 21:14 Dose:  25 mg











- Labs


Labs: 


 





 07/13/17 04:20 





 07/13/17 04:20 





 











PT  11.2 SECONDS (9.4-12.0)   12/14/15  05:20    


 


INR  1.05  (0.89-1.20)   12/14/15  05:20    


 


APTT  36.2 SECONDS (23.1-32.0)  H  12/14/15  05:20    














- Constitutional


Appears: Well, Non-toxic, No Acute Distress





- Head Exam


Head Exam: ATRAUMATIC, NORMAL INSPECTION, NORMOCEPHALIC





- Eye Exam


Eye Exam: EOMI, Normal appearance, PERRL


Pupil Exam: NORMAL ACCOMODATION, PERRL





- ENT Exam


ENT Exam: Mucous Membranes Moist, Normal Exam





- Neck Exam


Neck Exam: Full ROM, Normal Inspection.  absent: Lymphadenopathy





- Respiratory Exam


Respiratory Exam: Clear to Ausculation Bilateral, NORMAL BREATHING PATTERN





- Cardiovascular Exam


Cardiovascular Exam: REGULAR RHYTHM, RRR, +S1, +S2.  absent: Murmur





- GI/Abdominal Exam


GI & Abdominal Exam: Soft, Normal Bowel Sounds.  absent: Tenderness





- Extremities Exam


Extremities Exam: Full ROM, Normal Capillary Refill, Normal Inspection.  absent

: Joint Swelling, Pedal Edema





- Back Exam


Back Exam: NORMAL INSPECTION





- Neurological Exam


Neurological Exam: Alert, Awake, CN II-XII Intact, Normal Gait, Oriented x3





- Psychiatric Exam


Psychiatric exam: Normal Affect, Normal Mood





- Skin


Skin Exam: Dry, Intact, Normal Color, Warm





Assessment and Plan


(1) DVT prophylaxis


Status: Chronic   





(2) Scrotal edema


Status: Chronic   





(3) CAD (coronary artery disease)


Status: Chronic   





(4) Smoking


Status: Chronic   





(5) Low back pain


Status: Chronic   





(6) Prediabetes


Status: Chronic   





(7) Neurocognitive disorder


Status: Chronic

## 2017-09-29 RX ADMIN — DIVALPROEX SODIUM SCH MG: 500 TABLET, DELAYED RELEASE ORAL at 08:14

## 2017-09-29 RX ADMIN — DIVALPROEX SODIUM SCH MG: 500 TABLET, DELAYED RELEASE ORAL at 17:23

## 2017-09-29 NOTE — CP.PCM.PN
Subjective





- Date & Time of Evaluation


Date of Evaluation: 09/29/17


Time of Evaluation: 09:47





- Subjective


Subjective: 





s/o no f/c, n/v/d. no other complaints.calm and cooperative.


a/p cont plan, cont placement, cont all medical management





Objective





- Vital Signs/Intake and Output


Vital Signs (last 24 hours): 


 











Temp Pulse Resp BP Pulse Ox


 


 97.5 F L  84   18   128/82   96 


 


 09/29/17 07:57  09/29/17 08:14  09/29/17 07:57  09/29/17 08:14  09/29/17 07:57











- Medications


Medications: 


 Current Medications





Atorvastatin Calcium (Lipitor)  20 mg PO [facility]


   Last Admin: 09/28/17 21:11 Dose:  20 mg


Divalproex Sodium (Depakote Dr(*Bid*))  [facility] mg [facility]ID [facility]


   Last Admin: 09/29/17 08:14 Dose:  500 mg


Enalapril Maleate (Vasotec)  10 mg PO DAILY [facility]


   Last Admin: 09/29/17 08:14 Dose:  10 mg


Fenofibrate (Tricor)  145 mg PO [facility]ILY [facility]


   Last Admin: 09/29/17 08:15 Dose:  145 mg


Metoprolol Tartrate (Lopressor)  50 mg PO Q12 [facility]


   Last Admin: 09/29/17 08:14 Dose:  50 mg


Quetiapine Fumarate (Seroquel)  25 mg PO HS [facility]


   Last Admin: 09/28/17 21:11 Dose:  25 mg











- Labs


Labs: 


 





 07/13/17 04:20 





 07/13/17 04:20 





 











PT  11.2 SECONDS (9.4-12.0)   12/14/15  05:20    


 


INR  1.05  (0.89-1.20)   12/14/15  05:20    


 


APTT  36.2 SECONDS (23.1-32.0)  H  12/14/15  05:20    














- Constitutional


Appears: Well, Non-toxic, No Acute Distress





- Head Exam


Head Exam: ATRAUMATIC, NORMAL INSPECTION, NORMOCEPHALIC





- Eye Exam


Eye Exam: EOMI, Normal appearance, PERRL


Pupil Exam: NORMAL ACCOMODATION, PERRL





- ENT Exam


ENT Exam: Mucous Membranes Moist, Normal Exam





- Neck Exam


Neck Exam: Full ROM, Normal Inspection.  absent: Lymphadenopathy





- Respiratory Exam


Respiratory Exam: Clear to Ausculation Bilateral, NORMAL BREATHING PATTERN





- Cardiovascular Exam


Cardiovascular Exam: REGULAR RHYTHM, RRR, +S1, +S2.  absent: Murmur





- GI/Abdominal Exam


GI & Abdominal Exam: Soft, Normal Bowel Sounds.  absent: Tenderness





- Extremities Exam


Extremities Exam: Full ROM, Normal Capillary Refill, Normal Inspection.  absent

: Joint Swelling, Pedal Edema





- Back Exam


Back Exam: NORMAL INSPECTION





- Neurological Exam


Neurological Exam: Alert, Awake, CN II-XII Intact, Normal Gait, Oriented x3





- Psychiatric Exam


Psychiatric exam: Normal Affect, Normal Mood





- Skin


Skin Exam: Dry, Intact, Normal Color, Warm





Assessment and Plan


(1) DVT prophylaxis


Status: Chronic   





(2) Scrotal edema


Status: Chronic   





(3) CAD (coronary artery disease)


Status: Chronic   





(4) Smoking


Status: Chronic   





(5) Low back pain


Status: Chronic   





(6) Prediabetes


Status: Chronic   





(7) Neurocognitive disorder


Status: Chronic

## 2017-09-30 RX ADMIN — DIVALPROEX SODIUM SCH MG: 500 TABLET, DELAYED RELEASE ORAL at 16:29

## 2017-09-30 RX ADMIN — DIVALPROEX SODIUM SCH MG: 500 TABLET, DELAYED RELEASE ORAL at 09:21

## 2017-09-30 NOTE — CP.PCM.PN
Subjective





- Date & Time of Evaluation


Date of Evaluation: 09/30/17


Time of Evaluation: 08:12





- Subjective


Subjective: 





s/o no f/c, n/v/d. no other complaints.calm and cooperative.


a/p cont plan, cont placement, cont all medical management





Objective





- Vital Signs/Intake and Output


Vital Signs (last 24 hours): 


 











Temp Pulse Resp BP Pulse Ox


 


 97.7 F   67   20   120/74   99 


 


 09/30/17 07:44  09/30/17 07:44  09/30/17 07:44  09/30/17 07:44  09/30/17 07:44











- Medications


Medications: 


 Current Medications





Atorvastatin Calcium (Lipitor)  20 mg PO HS [facility]


   Last Admin: 09/29/17 21:32 Dose:  20 mg


Divalproex Sodium (Depakote Dr(*Bid*))  500 mg PO BID [facility]


   Last Admin: 09/29/17 17:23 Dose:  500 mg


Enalapril Maleate (Vasotec)  10 mg PO DAILY [facility]


   Last Admin: 09/29/17 08:14 Dose:  10 mg


Fenofibrate (Tricor)  145 mg PO DAILY [facility]


   Last Admin: 09/29/17 08:15 Dose:  145 mg


Metoprolol Tartrate (Lopressor)  50 mg PO Q12 [facility]


   Last Admin: 09/29/17 21:32 Dose:  50 mg


Quetiapine Fumarate (Seroquel)  25 mg PO HS [facility]


   Last Admin: 09/29/17 21:32 Dose:  25 mg











- Labs


Labs: 


 





 07/13/17 04:20 





 07/13/17 04:20 





 











PT  11.2 SECONDS (9.4-12.0)   12/14/15  05:20    


 


INR  1.05  (0.89-1.20)   12/14/15  05:20    


 


APTT  36.2 SECONDS (23.1-32.0)  H  12/14/15  05:20    














- Constitutional


Appears: Well, Non-toxic, No Acute Distress





- Head Exam


Head Exam: ATRAUMATIC, NORMAL INSPECTION, NORMOCEPHALIC





- Eye Exam


Eye Exam: EOMI, Normal appearance, PERRL


Pupil Exam: NORMAL ACCOMODATION, PERRL





- ENT Exam


ENT Exam: Mucous Membranes Moist, Normal Exam





- Neck Exam


Neck Exam: Full ROM, Normal Inspection.  absent: Lymphadenopathy





- Respiratory Exam


Respiratory Exam: Clear to Ausculation Bilateral, NORMAL BREATHING PATTERN





- Cardiovascular Exam


Cardiovascular Exam: REGULAR RHYTHM, RRR, +S1, +S2.  absent: Murmur





- GI/Abdominal Exam


GI & Abdominal Exam: Soft, Normal Bowel Sounds.  absent: Tenderness





- Extremities Exam


Extremities Exam: Full ROM, Normal Capillary Refill, Normal Inspection.  absent

: Joint Swelling, Pedal Edema





- Back Exam


Back Exam: NORMAL INSPECTION





- Neurological Exam


Neurological Exam: Alert, Awake, CN II-XII Intact, Normal Gait, Oriented x3





- Psychiatric Exam


Psychiatric exam: Normal Affect, Normal Mood





- Skin


Skin Exam: Dry, Intact, Normal Color, Warm





Assessment and Plan


(1) DVT prophylaxis


Status: Chronic   





(2) Scrotal edema


Status: Chronic   





(3) CAD (coronary artery disease)


Status: Chronic   





(4) Smoking


Status: Chronic   





(5) Low back pain


Status: Chronic   





(6) Prediabetes


Status: Chronic   





(7) Neurocognitive disorder


Status: Chronic

## 2017-10-01 RX ADMIN — DIVALPROEX SODIUM SCH MG: 500 TABLET, DELAYED RELEASE ORAL at 16:25

## 2017-10-01 RX ADMIN — DIVALPROEX SODIUM SCH MG: 500 TABLET, DELAYED RELEASE ORAL at 08:57

## 2017-10-01 NOTE — CP.PCM.PN
Subjective





- Date & Time of Evaluation


Date of Evaluation: 10/01/17


Time of Evaluation: 19:22





- Subjective


Subjective: 





s/o no f/c, n/v/d. no other complaints.calm and cooperative.


a/p cont plan, cont placement, cont all medical management





Objective





- Vital Signs/Intake and Output


Vital Signs (last 24 hours): 


 











Temp Pulse Resp BP Pulse Ox


 


 98.3 F   68   20   95/64 L  97 


 


 10/01/17 16:04  10/01/17 16:04  10/01/17 16:04  10/01/17 16:04  10/01/17 16:04











- Medications


Medications: 


 Current Medications





Atorvastatin Calcium (Lipitor)  20 mg PO [facility]


   Last Admin: 09/30/17 21:25 Dose:  20 mg


Divalproex Sodium (Depakote Dr(*Bid*))  500 mg PO BID [facility]


   Last Admin: 10/01/17 16:25 Dose:  500 mg


Enalapril Maleate (Vasotec)  10 mg PO DAILY [facility]


   Last Admin: 10/01/17 08:57 Dose:  10 mg


Fenofibrate (Tricor)  145 mg PO DAILY [facility]


   Last Admin: 10/01/17 08:57 Dose:  145 mg


Metoprolol Tartrate (Lopressor)  50 mg PO Q12 [facility]


   Last Admin: 10/01/17 08:57 Dose:  50 mg


Quetiapine Fumarate (Seroquel)  25 mg PO HS [facility]


   Last Admin: 09/30/17 21:25 Dose:  25 mg











- Labs


Labs: 


 





 07/13/17 04:20 





 07/13/17 04:20 





 











PT  11.2 SECONDS (9.4-12.0)   12/14/15  05:20    


 


INR  1.05  (0.89-1.20)   12/14/15  05:20    


 


APTT  36.2 SECONDS (23.1-32.0)  H  12/14/15  05:20    














- Constitutional


Appears: Well, Non-toxic, No Acute Distress





- Head Exam


Head Exam: ATRAUMATIC, NORMAL INSPECTION, NORMOCEPHALIC





- Eye Exam


Eye Exam: EOMI, Normal appearance, PERRL


Pupil Exam: NORMAL ACCOMODATION, PERRL





- ENT Exam


ENT Exam: Mucous Membranes Moist, Normal Exam





- Neck Exam


Neck Exam: Full ROM, Normal Inspection.  absent: Lymphadenopathy





- Respiratory Exam


Respiratory Exam: Clear to Ausculation Bilateral, NORMAL BREATHING PATTERN





- Cardiovascular Exam


Cardiovascular Exam: REGULAR RHYTHM, RRR, +S1, +S2.  absent: Murmur





- GI/Abdominal Exam


GI & Abdominal Exam: Soft, Normal Bowel Sounds.  absent: Tenderness





- Extremities Exam


Extremities Exam: Full ROM, Normal Capillary Refill, Normal Inspection.  absent

: Joint Swelling, Pedal Edema





- Back Exam


Back Exam: NORMAL INSPECTION





- Neurological Exam


Neurological Exam: Alert, Awake, CN II-XII Intact, Normal Gait, Oriented x3





- Psychiatric Exam


Psychiatric exam: Normal Affect, Normal Mood





- Skin


Skin Exam: Dry, Intact, Normal Color, Warm





Assessment and Plan


(1) DVT prophylaxis


Status: Chronic   





(2) Scrotal edema


Status: Chronic   





(3) CAD (coronary artery disease)


Status: Chronic   





(4) Smoking


Status: Chronic   





(5) Low back pain


Status: Chronic   





(6) Prediabetes


Status: Chronic   





(7) Neurocognitive disorder


Status: Chronic

## 2017-10-02 RX ADMIN — DIVALPROEX SODIUM SCH MG: 500 TABLET, DELAYED RELEASE ORAL at 08:38

## 2017-10-02 RX ADMIN — DIVALPROEX SODIUM SCH MG: 500 TABLET, DELAYED RELEASE ORAL at 18:30

## 2017-10-02 NOTE — CP.PCM.PN
Subjective





- Date & Time of Evaluation


Date of Evaluation: 10/02/17


Time of Evaluation: 07:25





- Subjective


Subjective: 





s/o no f/c, n/v/d. no other complaints.calm and cooperative.


a/p cont plan, cont placement, cont all medical management





Objective





- Vital Signs/Intake and Output


Vital Signs (last 24 hours): 


 











Temp Pulse Resp BP Pulse Ox


 


 97.6 F   60   20   99/68 L  96 


 


 10/02/17 00:18  10/02/17 00:18  10/02/17 00:18  10/02/17 00:18  10/02/17 00:18











- Medications


Medications: 


 Current Medications





Atorvastatin Calcium (Lipitor)  20 mg PO [facility]


   Last Admin: 10/01/17 21:10 Dose:  20 mg


Divalproex Sodium (Depakote Dr(*Bid*))  500 mg PO BID [facility]


   Last Admin: 10/01/17 16:25 Dose:  500 mg


Enalapril Maleate (Vasotec)  10 mg PO DAILY [facility]


   Last Admin: 10/01/17 08:57 Dose:  10 mg


Fenofibrate (Tricor)  145 mg PO DAILY [facility]


   Last Admin: 10/01/17 08:57 Dose:  145 mg


Metoprolol Tartrate (Lopressor)  50 mg PO Q12 [facility]


   Last Admin: 10/01/17 21:11 Dose:  Not Given


Quetiapine Fumarate (Seroquel)  25 mg PO [facility]


   Last Admin: 10/01/17 21:10 Dose:  25 mg











- Labs


Labs: 


 





 07/13/17 04:20 





 07/13/17 04:20 





 











PT  11.2 SECONDS (9.4-12.0)   12/14/15  05:20    


 


INR  1.05  (0.89-1.20)   12/14/15  05:20    


 


APTT  36.2 SECONDS (23.1-32.0)  H  12/14/15  05:20    














- Constitutional


Appears: Well, Non-toxic, No Acute Distress





- Head Exam


Head Exam: ATRAUMATIC, NORMAL INSPECTION, NORMOCEPHALIC





- Eye Exam


Eye Exam: EOMI, Normal appearance, PERRL


Pupil Exam: NORMAL ACCOMODATION, PERRL





- ENT Exam


ENT Exam: Mucous Membranes Moist, Normal Exam





- Neck Exam


Neck Exam: Full ROM, Normal Inspection.  absent: Lymphadenopathy





- Respiratory Exam


Respiratory Exam: Clear to Ausculation Bilateral, NORMAL BREATHING PATTERN





- Cardiovascular Exam


Cardiovascular Exam: REGULAR RHYTHM, RRR, +S1, +S2.  absent: Murmur





- GI/Abdominal Exam


GI & Abdominal Exam: Soft, Normal Bowel Sounds.  absent: Tenderness





- Extremities Exam


Extremities Exam: Full ROM, Normal Capillary Refill, Normal Inspection.  absent

: Joint Swelling, Pedal Edema





- Back Exam


Back Exam: NORMAL INSPECTION





- Neurological Exam


Neurological Exam: Alert, Awake, CN II-XII Intact, Normal Gait, Oriented x3





- Psychiatric Exam


Psychiatric exam: Normal Affect, Normal Mood





- Skin


Skin Exam: Dry, Intact, Normal Color, Warm





Assessment and Plan


(1) DVT prophylaxis


Status: Chronic   





(2) Scrotal edema


Status: Chronic   





(3) CAD (coronary artery disease)


Status: Chronic   





(4) Smoking


Status: Chronic   





(5) Low back pain


Status: Chronic   





(6) Prediabetes


Status: Chronic   





(7) Neurocognitive disorder


Status: Chronic

## 2017-10-03 RX ADMIN — DIVALPROEX SODIUM SCH MG: 500 TABLET, DELAYED RELEASE ORAL at 08:35

## 2017-10-03 RX ADMIN — DIVALPROEX SODIUM SCH MG: 500 TABLET, DELAYED RELEASE ORAL at 16:05

## 2017-10-03 NOTE — CP.PCM.PN
Subjective





- Date & Time of Evaluation


Date of Evaluation: 10/03/17


Time of Evaluation: 07:54





- Subjective


Subjective: 





s/o no f/c, n/v/d. no other complaints.calm and cooperative.


a/p cont plan, cont placement, cont all medical management





Objective





- Vital Signs/Intake and Output


Vital Signs (last 24 hours): 


 











Temp Pulse Resp BP Pulse Ox


 


 97.9 F   62   19   97/68 L  98 


 


 10/03/17 00:00  10/03/17 00:00  10/03/17 00:00  10/03/17 00:00  10/03/17 00:00











- Medications


Medications: 


 Current Medications





Atorvastatin Calcium (Lipitor)  20 mg PO [facility]


   Last Admin: 10/02/17 21:44 Dose:  20 mg


Divalproex Sodium (Depakote Dr(*Bid*))  500 mg PO BID [facility]


   Last Admin: 10/02/17 18:30 Dose:  500 mg


Enalapril Maleate (Vasotec)  10 mg PO DAILY [facility]


   Last Admin: 10/02/17 08:39 Dose:  10 mg


Fenofibrate (Tricor)  145 mg PO DAILY [facility]


   Last Admin: 10/02/17 08:39 Dose:  145 mg


Metoprolol Tartrate (Lopressor)  50 mg PO Q12 [facility]


   Last Admin: 10/02/17 21:46 Dose:  Not Given


Quetiapine Fumarate (Seroquel)  25 mg PO HS [facility]


   Last Admin: 10/02/17 21:44 Dose:  25 mg











- Labs


Labs: 


 





 07/13/17 04:20 





 07/13/17 04:20 





 











PT  11.2 SECONDS (9.4-12.0)   12/14/15  05:20    


 


INR  1.05  (0.89-1.20)   12/14/15  05:20    


 


APTT  36.2 SECONDS (23.1-32.0)  H  12/14/15  05:20    














- Constitutional


Appears: Well, Non-toxic, No Acute Distress





- Head Exam


Head Exam: ATRAUMATIC, NORMAL INSPECTION, NORMOCEPHALIC





- Eye Exam


Eye Exam: EOMI, Normal appearance, PERRL


Pupil Exam: NORMAL ACCOMODATION, PERRL





- ENT Exam


ENT Exam: Mucous Membranes Moist, Normal Exam





- Neck Exam


Neck Exam: Full ROM, Normal Inspection.  absent: Lymphadenopathy





- Respiratory Exam


Respiratory Exam: Clear to Ausculation Bilateral, NORMAL BREATHING PATTERN





- Cardiovascular Exam


Cardiovascular Exam: REGULAR RHYTHM, RRR, +S1, +S2.  absent: Murmur





- GI/Abdominal Exam


GI & Abdominal Exam: Soft, Normal Bowel Sounds.  absent: Tenderness





- Extremities Exam


Extremities Exam: Full ROM, Normal Capillary Refill, Normal Inspection.  absent

: Joint Swelling, Pedal Edema





- Back Exam


Back Exam: NORMAL INSPECTION





- Neurological Exam


Neurological Exam: Alert, Awake, CN II-XII Intact, Normal Gait, Oriented x3





- Psychiatric Exam


Psychiatric exam: Normal Affect, Normal Mood





- Skin


Skin Exam: Dry, Intact, Normal Color, Warm





Assessment and Plan


(1) DVT prophylaxis


Status: Chronic   





(2) Scrotal edema


Status: Chronic   





(3) CAD (coronary artery disease)


Status: Chronic   





(4) Smoking


Status: Chronic   





(5) Low back pain


Status: Chronic   





(6) Prediabetes


Status: Chronic   





(7) Neurocognitive disorder


Status: Chronic

## 2017-10-04 RX ADMIN — DIVALPROEX SODIUM SCH MG: 500 TABLET, DELAYED RELEASE ORAL at 16:24

## 2017-10-04 RX ADMIN — DIVALPROEX SODIUM SCH MG: 500 TABLET, DELAYED RELEASE ORAL at 09:09

## 2017-10-04 NOTE — CP.PCM.PN
Subjective





- Date & Time of Evaluation


Date of Evaluation: 10/04/17


Time of Evaluation: 08:08





- Subjective


Subjective: 





s/o no f/c, n/v/d. no other complaints.calm and cooperative.


a/p cont plan, cont placement, cont all medical management





Objective





- Vital Signs/Intake and Output


Vital Signs (last 24 hours): 


 











Temp Pulse Resp BP Pulse Ox


 


 98.1 F   76   18   94/66 L  98 


 


 10/04/17 07:41  10/04/17 07:41  10/04/17 07:41  10/04/17 07:41  10/04/17 07:41











- Medications


Medications: 


 Current Medications





Atorvastatin Calcium (Lipitor)  20 mg PO [facility]


   Last Admin: 10/03/17 21:08 Dose:  20 mg


Divalproex Sodium (Depakote Dr(*Bid*))  500 mg PO BID [facility]


   Last Admin: 10/03/17 16:05 Dose:  500 mg


Enalapril Maleate (Vasotec)  10 mg PO DAILY [facility]


   Last Admin: 10/03/17 08:36 Dose:  10 mg


Fenofibrate (Tricor)  145 mg PO DAILY [facility]


   Last Admin: 10/03/17 08:36 Dose:  145 mg


Metoprolol Tartrate (Lopressor)  50 mg PO Q12 [facility]


   Last Admin: 10/03/17 21:08 Dose:  Not Given


Quetiapine Fumarate (Seroquel)  25 mg PO HS [facility]


   Last Admin: 10/03/17 21:08 Dose:  25 mg











- Labs


Labs: 


 





 07/13/17 04:20 





 07/13/17 04:20 





 











PT  11.2 SECONDS (9.4-12.0)   12/14/15  05:20    


 


INR  1.05  (0.89-1.20)   12/14/15  05:20    


 


APTT  36.2 SECONDS (23.1-32.0)  H  12/14/15  05:20    














- Constitutional


Appears: Well, Non-toxic, No Acute Distress





- Head Exam


Head Exam: ATRAUMATIC, NORMAL INSPECTION, NORMOCEPHALIC





- Eye Exam


Eye Exam: EOMI, Normal appearance, PERRL


Pupil Exam: NORMAL ACCOMODATION, PERRL





- ENT Exam


ENT Exam: Mucous Membranes Moist, Normal Exam





- Neck Exam


Neck Exam: Full ROM, Normal Inspection.  absent: Lymphadenopathy





- Respiratory Exam


Respiratory Exam: Clear to Ausculation Bilateral, NORMAL BREATHING PATTERN





- Cardiovascular Exam


Cardiovascular Exam: REGULAR RHYTHM, RRR, +S1, +S2.  absent: Murmur





- GI/Abdominal Exam


GI & Abdominal Exam: Soft, Normal Bowel Sounds.  absent: Tenderness





- Extremities Exam


Extremities Exam: Full ROM, Normal Capillary Refill, Normal Inspection.  absent

: Joint Swelling, Pedal Edema





- Back Exam


Back Exam: NORMAL INSPECTION





- Neurological Exam


Neurological Exam: Alert, Awake, CN II-XII Intact, Normal Gait, Oriented x3





- Psychiatric Exam


Psychiatric exam: Normal Affect, Normal Mood





- Skin


Skin Exam: Dry, Intact, Normal Color, Warm





Assessment and Plan


(1) DVT prophylaxis


Status: Chronic   





(2) Scrotal edema


Status: Chronic   





(3) CAD (coronary artery disease)


Status: Chronic   





(4) Smoking


Status: Chronic   





(5) Low back pain


Status: Chronic   





(6) Prediabetes


Status: Chronic   





(7) Neurocognitive disorder


Status: Chronic

## 2017-10-05 RX ADMIN — DIVALPROEX SODIUM SCH MG: 500 TABLET, DELAYED RELEASE ORAL at 08:20

## 2017-10-05 RX ADMIN — DIVALPROEX SODIUM SCH MG: 500 TABLET, DELAYED RELEASE ORAL at 16:46

## 2017-10-05 NOTE — CP.PCM.PN
Subjective





- Date & Time of Evaluation


Date of Evaluation: 10/05/17


Time of Evaluation: 07:10





- Subjective


Subjective: 





s/o no f/c, n/v/d. no other complaints.calm and cooperative.


a/p cont plan, cont placement, cont all medical management





Objective





- Vital Signs/Intake and Output


Vital Signs (last 24 hours): 


 











Temp Pulse Resp BP Pulse Ox


 


 97.6 F   75   19   104/68   97 


 


 10/05/17 00:00  10/05/17 00:00  10/05/17 00:00  10/05/17 00:00  10/05/17 00:00











- Medications


Medications: 


 Current Medications





Atorvastatin Calcium (Lipitor)  20 mg PO [facility]


   Last Admin: 10/04/17 21:09 Dose:  20 mg


Divalproex Sodium (Depakote Dr(*Bid*))  500 mg PO BID [facility]


   Last Admin: 10/04/17 16:24 Dose:  500 mg


Enalapril Maleate (Vasotec)  10 mg PO DAILY [facility]


   Last Admin: 10/04/17 09:10 Dose:  Not Given


Fenofibrate (Tricor)  145 mg PO DAILY [facility]


   Last Admin: 10/04/17 09:08 Dose:  145 mg


Metoprolol Tartrate (Lopressor)  50 mg PO Q12 [facility]


   Last Admin: 10/04/17 21:12 Dose:  50 mg


Quetiapine Fumarate (Seroquel)  25 mg PO HS [facility]


   Last Admin: 10/04/17 21:09 Dose:  25 mg











- Labs


Labs: 


 





 07/13/17 04:20 





 07/13/17 04:20 





 











PT  11.2 SECONDS (9.4-12.0)   12/14/15  05:20    


 


INR  1.05  (0.89-1.20)   12/14/15  05:20    


 


APTT  36.2 SECONDS (23.1-32.0)  H  12/14/15  05:20    














- Constitutional


Appears: Well, Non-toxic, No Acute Distress





- Head Exam


Head Exam: ATRAUMATIC, NORMAL INSPECTION, NORMOCEPHALIC





- Eye Exam


Eye Exam: EOMI, Normal appearance, PERRL


Pupil Exam: NORMAL ACCOMODATION, PERRL





- ENT Exam


ENT Exam: Mucous Membranes Moist, Normal Exam





- Neck Exam


Neck Exam: Full ROM, Normal Inspection.  absent: Lymphadenopathy





- Respiratory Exam


Respiratory Exam: Clear to Ausculation Bilateral, NORMAL BREATHING PATTERN





- Cardiovascular Exam


Cardiovascular Exam: REGULAR RHYTHM, RRR, +S1, +S2.  absent: Murmur





- GI/Abdominal Exam


GI & Abdominal Exam: Soft, Normal Bowel Sounds.  absent: Tenderness





- Extremities Exam


Extremities Exam: Full ROM, Normal Capillary Refill, Normal Inspection.  absent

: Joint Swelling, Pedal Edema





- Back Exam


Back Exam: NORMAL INSPECTION





- Neurological Exam


Neurological Exam: Alert, Awake, CN II-XII Intact, Normal Gait, Oriented x3





- Psychiatric Exam


Psychiatric exam: Normal Affect, Normal Mood





- Skin


Skin Exam: Dry, Intact, Normal Color, Warm





Assessment and Plan


(1) DVT prophylaxis


Status: Chronic   





(2) Scrotal edema


Status: Chronic   





(3) CAD (coronary artery disease)


Status: Chronic   





(4) Smoking


Status: Chronic   





(5) Low back pain


Status: Chronic   





(6) Prediabetes


Status: Chronic   





(7) Neurocognitive disorder


Status: Chronic

## 2017-10-06 RX ADMIN — DIVALPROEX SODIUM SCH MG: 500 TABLET, DELAYED RELEASE ORAL at 16:57

## 2017-10-06 RX ADMIN — DIVALPROEX SODIUM SCH MG: 500 TABLET, DELAYED RELEASE ORAL at 09:30

## 2017-10-06 NOTE — CP.PCM.PN
Subjective





- Date & Time of Evaluation


Date of Evaluation: 10/06/17


Time of Evaluation: 07:31





- Subjective


Subjective: 





s/o no f/c, n/v/d. no other complaints.calm and cooperative.


a/p cont plan, cont placement, cont all medical management





Objective





- Vital Signs/Intake and Output


Vital Signs (last 24 hours): 


 











Temp Pulse Resp BP Pulse Ox


 


 97.9 F   77   18   100/65   96 


 


 10/06/17 00:00  10/06/17 00:00  10/06/17 00:00  10/06/17 00:00  10/06/17 00:00











- Medications


Medications: 


 Current Medications





Atorvastatin Calcium (Lipitor)  20 mg PO [facility]


   Last Admin: 10/05/17 22:02 Dose:  20 mg


Divalproex Sodium (Depakote Dr(*Bid*))  500 mg PO BID [facility]


   Last Admin: 10/05/17 16:46 Dose:  500 mg


Enalapril Maleate (Vasotec)  10 mg PO DAILY [facility]


   Last Admin: 10/05/17 08:20 Dose:  10 mg


Fenofibrate (Tricor)  145 mg PO DAILY [facility]


   Last Admin: 10/05/17 08:20 Dose:  145 mg


Metoprolol Tartrate (Lopressor)  50 mg PO Q12 [facility]


   Last Admin: 10/05/17 22:01 Dose:  50 mg


Quetiapine Fumarate (Seroquel)  25 mg PO HS [facility]


   Last Admin: 10/05/17 22:02 Dose:  25 mg











- Labs


Labs: 


 





 07/13/17 04:20 





 07/13/17 04:20 





 











PT  11.2 SECONDS (9.4-12.0)   12/14/15  05:20    


 


INR  1.05  (0.89-1.20)   12/14/15  05:20    


 


APTT  36.2 SECONDS (23.1-32.0)  H  12/14/15  05:20    














- Constitutional


Appears: Well, Non-toxic, No Acute Distress





- Head Exam


Head Exam: ATRAUMATIC, NORMAL INSPECTION, NORMOCEPHALIC





- Eye Exam


Eye Exam: EOMI, Normal appearance, PERRL


Pupil Exam: NORMAL ACCOMODATION, PERRL





- ENT Exam


ENT Exam: Mucous Membranes Moist, Normal Exam





- Neck Exam


Neck Exam: Full ROM, Normal Inspection.  absent: Lymphadenopathy





- Respiratory Exam


Respiratory Exam: Clear to Ausculation Bilateral, NORMAL BREATHING PATTERN





- Cardiovascular Exam


Cardiovascular Exam: REGULAR RHYTHM, RRR, +S1, +S2.  absent: Murmur





- GI/Abdominal Exam


GI & Abdominal Exam: Soft, Normal Bowel Sounds.  absent: Tenderness





- Extremities Exam


Extremities Exam: Full ROM, Normal Capillary Refill, Normal Inspection.  absent

: Joint Swelling, Pedal Edema





- Back Exam


Back Exam: NORMAL INSPECTION





- Neurological Exam


Neurological Exam: Alert, Awake, CN II-XII Intact, Normal Gait, Oriented x3





- Psychiatric Exam


Psychiatric exam: Normal Affect, Normal Mood





- Skin


Skin Exam: Dry, Intact, Normal Color, Warm





Assessment and Plan


(1) DVT prophylaxis


Status: Chronic   





(2) Scrotal edema


Status: Chronic   





(3) CAD (coronary artery disease)


Status: Chronic   





(4) Smoking


Status: Chronic   





(5) Low back pain


Status: Chronic   





(6) Prediabetes


Status: Chronic   





(7) Neurocognitive disorder


Status: Chronic

## 2017-10-07 RX ADMIN — DIVALPROEX SODIUM SCH MG: 500 TABLET, DELAYED RELEASE ORAL at 16:05

## 2017-10-07 RX ADMIN — DIVALPROEX SODIUM SCH MG: 500 TABLET, DELAYED RELEASE ORAL at 08:58

## 2017-10-08 RX ADMIN — DIVALPROEX SODIUM SCH MG: 500 TABLET, DELAYED RELEASE ORAL at 09:01

## 2017-10-08 RX ADMIN — DIVALPROEX SODIUM SCH MG: 500 TABLET, DELAYED RELEASE ORAL at 17:17

## 2017-10-08 NOTE — CP.PCM.PN
Subjective





- Date & Time of Evaluation


Date of Evaluation: 10/08/17


Time of Evaluation: 18:36





- Subjective


Subjective: 





s/o no f/c, n/v/d. no other complaints.calm and cooperative.


a/p cont plan, cont placement, cont all medical management





Objective





- Vital Signs/Intake and Output


Vital Signs (last 24 hours): 


 











Temp Pulse Resp BP Pulse Ox


 


 98.3 F   75   18   105/75   96 


 


 10/08/17 16:40  10/08/17 16:40  10/08/17 16:40  10/08/17 16:40  10/08/17 16:40











- Medications


Medications: 


 Current Medications





Atorvastatin Calcium (Lipitor)  20 mg PO HS [facility]


   Last Admin: 10/07/17 21:47 Dose:  20 mg


Divalproex Sodium (Depakote Dr(*Bid*))  500 mg PO BID [facility]


   Last Admin: 10/08/17 17:17 Dose:  500 mg


Enalapril Maleate (Vasotec)  10 mg PO DAILY [facility]


   Last Admin: 10/08/17 09:02 Dose:  10 mg


Fenofibrate (Tricor)  145 mg PO DAILY [facility]


   Last Admin: 10/08/17 09:01 Dose:  145 mg


Metoprolol Tartrate (Lopressor)  50 mg PO Q12 [facility]


   Last Admin: 10/08/17 09:01 Dose:  50 mg


Quetiapine Fumarate (Seroquel)  25 mg PO HS [facility]


   Last Admin: 10/07/17 21:47 Dose:  25 mg











- Labs


Labs: 


 





 07/13/17 04:20 





 07/13/17 04:20 





 











PT  11.2 SECONDS (9.4-12.0)   12/14/15  05:20    


 


INR  1.05  (0.89-1.20)   12/14/15  05:20    


 


APTT  36.2 SECONDS (23.1-32.0)  H  12/14/15  05:20    














- Constitutional


Appears: Well, Non-toxic, No Acute Distress





- Head Exam


Head Exam: ATRAUMATIC, NORMAL INSPECTION, NORMOCEPHALIC





- Eye Exam


Eye Exam: EOMI, Normal appearance, PERRL


Pupil Exam: NORMAL ACCOMODATION, PERRL





- ENT Exam


ENT Exam: Mucous Membranes Moist, Normal Exam





- Neck Exam


Neck Exam: Full ROM, Normal Inspection.  absent: Lymphadenopathy





- Respiratory Exam


Respiratory Exam: Clear to Ausculation Bilateral, NORMAL BREATHING PATTERN





- Cardiovascular Exam


Cardiovascular Exam: REGULAR RHYTHM, RRR, +S1, +S2.  absent: Murmur





- GI/Abdominal Exam


GI & Abdominal Exam: Soft, Normal Bowel Sounds.  absent: Tenderness





- Extremities Exam


Extremities Exam: Full ROM, Normal Capillary Refill, Normal Inspection.  absent

: Joint Swelling, Pedal Edema





- Back Exam


Back Exam: NORMAL INSPECTION





- Neurological Exam


Neurological Exam: Alert, Awake, CN II-XII Intact, Normal Gait, Oriented x3





- Psychiatric Exam


Psychiatric exam: Normal Affect, Normal Mood





- Skin


Skin Exam: Dry, Intact, Normal Color, Warm





Assessment and Plan


(1) DVT prophylaxis


Status: Chronic   





(2) Scrotal edema


Status: Chronic   





(3) CAD (coronary artery disease)


Status: Chronic   





(4) Smoking


Status: Chronic   





(5) Low back pain


Status: Chronic   





(6) Prediabetes


Status: Chronic   





(7) Neurocognitive disorder


Status: Chronic

## 2017-10-08 NOTE — CP.PCM.PN
Subjective





- Date & Time of Evaluation


Date of Evaluation: 10/07/17


Time of Evaluation: 07:00





- Subjective


Subjective: 





s/o no f/c, n/v/d. no other complaints.calm and cooperative.


a/p cont plan, cont placement, cont all medical management





Objective





- Vital Signs/Intake and Output


Vital Signs (last 24 hours): 


 











Temp Pulse Resp BP Pulse Ox


 


 98.3 F   75   18   105/75   96 


 


 10/08/17 16:40  10/08/17 16:40  10/08/17 16:40  10/08/17 16:40  10/08/17 16:40











- Medications


Medications: 


 Current Medications





Atorvastatin Calcium (Lipitor)  20 mg PO [facility]


   Last Admin: 10/07/17 21:47 Dose:  20 mg


Divalproex Sodium (Depakote Dr(*Bid*))  500 mg PO BID [facility]


   Last Admin: 10/08/17 17:17 Dose:  500 mg


Enalapril Maleate (Vasotec)  10 mg PO DAILY [facility]


   Last Admin: 10/08/17 09:02 Dose:  10 mg


Fenofibrate (Tricor)  145 mg PO DAILY [facility]


   Last Admin: 10/08/17 09:01 Dose:  145 mg


Metoprolol Tartrate (Lopressor)  50 mg PO Q12 [facility]


   Last Admin: 10/08/17 09:01 Dose:  50 mg


Quetiapine Fumarate (Seroquel)  25 mg PO HS [facility]


   Last Admin: 10/07/17 21:47 Dose:  25 mg











- Labs


Labs: 


 





 07/13/17 04:20 





 07/13/17 04:20 





 











PT  11.2 SECONDS (9.4-12.0)   12/14/15  05:20    


 


INR  1.05  (0.89-1.20)   12/14/15  05:20    


 


APTT  36.2 SECONDS (23.1-32.0)  H  12/14/15  05:20    














- Constitutional


Appears: Well, Non-toxic, No Acute Distress





- Head Exam


Head Exam: ATRAUMATIC, NORMAL INSPECTION, NORMOCEPHALIC





- Eye Exam


Eye Exam: EOMI, Normal appearance, PERRL


Pupil Exam: NORMAL ACCOMODATION, PERRL





- ENT Exam


ENT Exam: Mucous Membranes Moist, Normal Exam





- Neck Exam


Neck Exam: Full ROM, Normal Inspection.  absent: Lymphadenopathy





- Respiratory Exam


Respiratory Exam: Clear to Ausculation Bilateral, NORMAL BREATHING PATTERN





- Cardiovascular Exam


Cardiovascular Exam: REGULAR RHYTHM, RRR, +S1, +S2.  absent: Murmur





- GI/Abdominal Exam


GI & Abdominal Exam: Soft, Normal Bowel Sounds.  absent: Tenderness





- Extremities Exam


Extremities Exam: Full ROM, Normal Capillary Refill, Normal Inspection.  absent

: Joint Swelling, Pedal Edema





- Back Exam


Back Exam: NORMAL INSPECTION





- Neurological Exam


Neurological Exam: Alert, Awake, CN II-XII Intact, Normal Gait, Oriented x3





- Psychiatric Exam


Psychiatric exam: Normal Affect, Normal Mood





- Skin


Skin Exam: Dry, Intact, Normal Color, Warm





Assessment and Plan


(1) DVT prophylaxis


Status: Chronic   





(2) Scrotal edema


Status: Chronic   





(3) CAD (coronary artery disease)


Status: Chronic   





(4) Smoking


Status: Chronic   





(5) Low back pain


Status: Chronic   





(6) Prediabetes


Status: Chronic   





(7) Neurocognitive disorder


Status: Chronic

## 2017-10-09 RX ADMIN — DIVALPROEX SODIUM SCH MG: 500 TABLET, DELAYED RELEASE ORAL at 08:28

## 2017-10-09 RX ADMIN — DIVALPROEX SODIUM SCH MG: 500 TABLET, DELAYED RELEASE ORAL at 17:18

## 2017-10-09 NOTE — CP.PCM.PN
Subjective





- Date & Time of Evaluation


Date of Evaluation: 10/09/17


Time of Evaluation: 07:30





- Subjective


Subjective: 





s/o no f/c, n/v/d. no other complaints.calm and cooperative.


a/p cont plan, cont placement, cont all medical management





Objective





- Vital Signs/Intake and Output


Vital Signs (last 24 hours): 


 











Temp Pulse Resp BP Pulse Ox


 


 97.2 F L  88   20   110/77   96 


 


 10/09/17 01:25  10/09/17 01:25  10/09/17 01:25  10/09/17 01:25  10/09/17 01:25











- Medications


Medications: 


 Current Medications





Atorvastatin Calcium (Lipitor)  20 mg PO HS [facility]


   Last Admin: 10/08/17 21:21 Dose:  20 mg


Divalproex Sodium (Depakote Dr(*Bid*))  500 mg PO BID [facility]


   Last Admin: 10/08/17 17:17 Dose:  500 mg


Enalapril Maleate (Vasotec)  10 mg PO DAILY [facility]


   Last Admin: 10/08/17 09:02 Dose:  10 mg


Fenofibrate (Tricor)  145 mg PO DAILY [facility]


   Last Admin: 10/08/17 09:01 Dose:  145 mg


Metoprolol Tartrate (Lopressor)  50 mg PO Q12 [facility]


   Last Admin: 10/08/17 21:21 Dose:  50 mg


Quetiapine Fumarate (Seroquel)  25 mg PO HS [facility]


   Last Admin: 10/08/17 21:21 Dose:  25 mg











- Labs


Labs: 


 





 07/13/17 04:20 





 07/13/17 04:20 





 











PT  11.2 SECONDS (9.4-12.0)   12/14/15  05:20    


 


INR  1.05  (0.89-1.20)   12/14/15  05:20    


 


APTT  36.2 SECONDS (23.1-32.0)  H  12/14/15  05:20    














- Constitutional


Appears: Well, Non-toxic, No Acute Distress





- Head Exam


Head Exam: ATRAUMATIC, NORMAL INSPECTION, NORMOCEPHALIC





- Eye Exam


Eye Exam: EOMI, Normal appearance, PERRL


Pupil Exam: NORMAL ACCOMODATION, PERRL





- ENT Exam


ENT Exam: Mucous Membranes Moist, Normal Exam





- Neck Exam


Neck Exam: Full ROM, Normal Inspection.  absent: Lymphadenopathy





- Respiratory Exam


Respiratory Exam: Clear to Ausculation Bilateral, NORMAL BREATHING PATTERN





- Cardiovascular Exam


Cardiovascular Exam: REGULAR RHYTHM, RRR, +S1, +S2.  absent: Murmur





- GI/Abdominal Exam


GI & Abdominal Exam: Soft, Normal Bowel Sounds.  absent: Tenderness





- Extremities Exam


Extremities Exam: Full ROM, Normal Capillary Refill, Normal Inspection.  absent

: Joint Swelling, Pedal Edema





- Back Exam


Back Exam: NORMAL INSPECTION





- Neurological Exam


Neurological Exam: Alert, Awake, CN II-XII Intact, Normal Gait, Oriented x3





- Psychiatric Exam


Psychiatric exam: Normal Affect, Normal Mood





- Skin


Skin Exam: Dry, Intact, Normal Color, Warm





Assessment and Plan


(1) DVT prophylaxis


Status: Chronic   





(2) Scrotal edema


Status: Chronic   





(3) CAD (coronary artery disease)


Status: Chronic   





(4) Smoking


Status: Chronic   





(5) Low back pain


Status: Chronic   





(6) Prediabetes


Status: Chronic   





(7) Neurocognitive disorder


Status: Chronic

## 2017-10-10 RX ADMIN — DIVALPROEX SODIUM SCH MG: 500 TABLET, DELAYED RELEASE ORAL at 18:03

## 2017-10-10 RX ADMIN — DIVALPROEX SODIUM SCH MG: 500 TABLET, DELAYED RELEASE ORAL at 09:41

## 2017-10-10 NOTE — CP.PCM.PN
Subjective





- Date & Time of Evaluation


Date of Evaluation: 10/10/17


Time of Evaluation: 07:13





- Subjective


Subjective: 





s/o no f/c, n/v/d. no other complaints.calm and cooperative.


a/p cont plan, cont placement, cont all medical management





Objective





- Vital Signs/Intake and Output


Vital Signs (last 24 hours): 


 











Temp Pulse Resp BP Pulse Ox


 


 97.9 F   70   19   100/66   95 


 


 10/10/17 00:35  10/10/17 00:35  10/10/17 00:35  10/10/17 00:35  10/10/17 00:35











- Medications


Medications: 


 Current Medications





Atorvastatin Calcium (Lipitor)  20 mg PO [facility]


   Last Admin: 10/09/17 22:01 Dose:  20 mg


Divalproex Sodium (Depakote Dr(*Bid*))  500 mg PO BID [facility]


   Last Admin: 10/09/17 17:18 Dose:  500 mg


Enalapril Maleate (Vasotec)  10 mg PO DAILY [facility]


   Last Admin: 10/09/17 08:30 Dose:  10 mg


Fenofibrate (Tricor)  145 mg PO DAILY [facility]


   Last Admin: 10/09/17 08:30 Dose:  145 mg


Metoprolol Tartrate (Lopressor)  50 mg PO Q12 [facility]


   Last Admin: 10/09/17 22:00 Dose:  50 mg


Quetiapine Fumarate (Seroquel)  25 mg PO HS [facility]


   Last Admin: 10/09/17 22:01 Dose:  25 mg











- Labs


Labs: 


 





 07/13/17 04:20 





 07/13/17 04:20 





 











PT  11.2 SECONDS (9.4-12.0)   12/14/15  05:20    


 


INR  1.05  (0.89-1.20)   12/14/15  05:20    


 


APTT  36.2 SECONDS (23.1-32.0)  H  12/14/15  05:20    














- Constitutional


Appears: Well, Non-toxic, No Acute Distress





- Head Exam


Head Exam: ATRAUMATIC, NORMAL INSPECTION, NORMOCEPHALIC





- Eye Exam


Eye Exam: EOMI, Normal appearance, PERRL


Pupil Exam: NORMAL ACCOMODATION, PERRL





- ENT Exam


ENT Exam: Mucous Membranes Moist, Normal Exam





- Neck Exam


Neck Exam: Full ROM, Normal Inspection.  absent: Lymphadenopathy





- Respiratory Exam


Respiratory Exam: Clear to Ausculation Bilateral, NORMAL BREATHING PATTERN





- Cardiovascular Exam


Cardiovascular Exam: REGULAR RHYTHM, RRR, +S1, +S2.  absent: Murmur





- GI/Abdominal Exam


GI & Abdominal Exam: Soft, Normal Bowel Sounds.  absent: Tenderness





- Extremities Exam


Extremities Exam: Full ROM, Normal Capillary Refill, Normal Inspection.  absent

: Joint Swelling, Pedal Edema





- Back Exam


Back Exam: NORMAL INSPECTION





- Neurological Exam


Neurological Exam: Alert, Awake, CN II-XII Intact, Normal Gait, Oriented x3





- Psychiatric Exam


Psychiatric exam: Normal Affect, Normal Mood





- Skin


Skin Exam: Dry, Intact, Normal Color, Warm





Assessment and Plan


(1) DVT prophylaxis


Status: Chronic   





(2) Scrotal edema


Status: Chronic   





(3) CAD (coronary artery disease)


Status: Chronic   





(4) Smoking


Status: Chronic   





(5) Low back pain


Status: Chronic   





(6) Prediabetes


Status: Chronic   





(7) Neurocognitive disorder


Status: Chronic

## 2017-10-11 RX ADMIN — DIVALPROEX SODIUM SCH MG: 500 TABLET, DELAYED RELEASE ORAL at 17:30

## 2017-10-11 RX ADMIN — DIVALPROEX SODIUM SCH MG: 500 TABLET, DELAYED RELEASE ORAL at 08:26

## 2017-10-11 NOTE — CP.PCM.PN
Subjective





- Date & Time of Evaluation


Date of Evaluation: 10/11/17


Time of Evaluation: 07:15





- Subjective


Subjective: 





s/o no f/c, n/v/d. no other complaints.calm and cooperative.


a/p cont plan, cont placement, cont all medical management





Objective





- Vital Signs/Intake and Output


Vital Signs (last 24 hours): 


 











Temp Pulse Resp BP Pulse Ox


 


 97.6 F   72   19   104/72   97 


 


 10/11/17 00:10  10/11/17 00:10  10/11/17 00:10  10/11/17 00:10  10/11/17 00:10











- Medications


Medications: 


 Current Medications





Atorvastatin Calcium (Lipitor)  20 mg PO HS [facility]


   Last Admin: 10/10/17 21:33 Dose:  20 mg


Divalproex Sodium (Depakote Dr(*Bid*))  500 mg PO BID [facility]


   Last Admin: 10/10/17 18:03 Dose:  500 mg


Enalapril Maleate (Vasotec)  10 mg PO DAILY [facility]


   Last Admin: 10/10/17 09:46 Dose:  10 mg


Fenofibrate (Tricor)  145 mg PO DAILY [facility]


   Last Admin: 10/10/17 09:41 Dose:  145 mg


Metoprolol Tartrate (Lopressor)  50 mg PO Q12 [facility]


   Last Admin: 10/10/17 21:33 Dose:  50 mg


Quetiapine Fumarate (Seroquel)  25 mg PO HS [facility]


   Last Admin: 10/10/17 21:33 Dose:  25 mg











- Labs


Labs: 


 





 07/13/17 04:20 





 07/13/17 04:20 





 











PT  11.2 SECONDS (9.4-12.0)   12/14/15  05:20    


 


INR  1.05  (0.89-1.20)   12/14/15  05:20    


 


APTT  36.2 SECONDS (23.1-32.0)  H  12/14/15  05:20    














- Constitutional


Appears: Well, Non-toxic, No Acute Distress





- Head Exam


Head Exam: ATRAUMATIC, NORMAL INSPECTION, NORMOCEPHALIC





- Eye Exam


Eye Exam: EOMI, Normal appearance, PERRL


Pupil Exam: NORMAL ACCOMODATION, PERRL





- ENT Exam


ENT Exam: Mucous Membranes Moist, Normal Exam





- Neck Exam


Neck Exam: Full ROM, Normal Inspection.  absent: Lymphadenopathy





- Respiratory Exam


Respiratory Exam: Clear to Ausculation Bilateral, NORMAL BREATHING PATTERN





- Cardiovascular Exam


Cardiovascular Exam: REGULAR RHYTHM, RRR, +S1, +S2.  absent: Murmur





- GI/Abdominal Exam


GI & Abdominal Exam: Soft, Normal Bowel Sounds.  absent: Tenderness





-  Exam


Speculum exam: NORMAL SPECULUM EXAM





- Extremities Exam


Extremities Exam: Full ROM, Normal Capillary Refill, Normal Inspection.  absent

: Joint Swelling, Pedal Edema





- Back Exam


Back Exam: NORMAL INSPECTION





- Neurological Exam


Neurological Exam: Alert, Awake, CN II-XII Intact, Normal Gait, Oriented x3





- Psychiatric Exam


Psychiatric exam: Normal Affect, Normal Mood





- Skin


Skin Exam: Dry, Intact, Normal Color, Warm





Assessment and Plan


(1) DVT prophylaxis


Status: Chronic   





(2) Scrotal edema


Status: Chronic   





(3) CAD (coronary artery disease)


Status: Chronic   





(4) Smoking


Status: Chronic   





(5) Low back pain


Status: Chronic   





(6) Prediabetes


Status: Chronic   





(7) Neurocognitive disorder


Status: Chronic

## 2017-10-12 RX ADMIN — DIVALPROEX SODIUM SCH MG: 250 TABLET, DELAYED RELEASE ORAL at 10:00

## 2017-10-12 RX ADMIN — DIVALPROEX SODIUM SCH MG: 250 TABLET, DELAYED RELEASE ORAL at 16:59

## 2017-10-12 NOTE — CP.PCM.PN
Subjective





- Date & Time of Evaluation


Date of Evaluation: 10/12/17


Time of Evaluation: 07:21





- Subjective


Subjective: 





s/o no f/c, n/v/d. no other complaints.calm and cooperative.


a/p cont plan, cont placement, cont all medical management





Objective





- Vital Signs/Intake and Output


Vital Signs (last 24 hours): 


 











Temp Pulse Resp BP Pulse Ox


 


 98.3 F   65   19   99/64 L  96 


 


 10/12/17 01:04  10/12/17 01:04  10/12/17 01:04  10/12/17 01:04  10/12/17 01:04











- Medications


Medications: 


 Current Medications





Atorvastatin Calcium (Lipitor)  20 mg PO HS [facility]


   Last Admin: 10/11/17 21:17 Dose:  20 mg


Divalproex Sodium (Depakote Dr(*Bid*))  500 mg PO BID [facility]


   Last Admin: 10/11/17 17:30 Dose:  500 mg


Enalapril Maleate (Vasotec)  10 mg PO DAILY [facility]


   Last Admin: 10/11/17 08:26 Dose:  10 mg


Fenofibrate (Tricor)  145 mg PO DAILY [facility]


   Last Admin: 10/11/17 08:26 Dose:  145 mg


Metoprolol Tartrate (Lopressor)  50 mg PO Q12 [facility]


   Last Admin: 10/11/17 21:14 Dose:  50 mg


Quetiapine Fumarate (Seroquel)  25 mg PO HS [facility]


   Last Admin: 10/11/17 21:17 Dose:  25 mg











- Labs


Labs: 


 





 07/13/17 04:20 





 07/13/17 04:20 





 











PT  11.2 SECONDS (9.4-12.0)   12/14/15  05:20    


 


INR  1.05  (0.89-1.20)   12/14/15  05:20    


 


APTT  36.2 SECONDS (23.1-32.0)  H  12/14/15  05:20    














- Constitutional


Appears: Well, Non-toxic, No Acute Distress





- Head Exam


Head Exam: ATRAUMATIC, NORMAL INSPECTION, NORMOCEPHALIC





- Eye Exam


Eye Exam: EOMI, Normal appearance, PERRL


Pupil Exam: NORMAL ACCOMODATION, PERRL





- ENT Exam


ENT Exam: Mucous Membranes Moist, Normal Exam





- Neck Exam


Neck Exam: Full ROM, Normal Inspection.  absent: Lymphadenopathy





- Respiratory Exam


Respiratory Exam: Clear to Ausculation Bilateral, NORMAL BREATHING PATTERN





- Cardiovascular Exam


Cardiovascular Exam: REGULAR RHYTHM, RRR, +S1, +S2.  absent: Murmur





- GI/Abdominal Exam


GI & Abdominal Exam: Soft, Normal Bowel Sounds.  absent: Tenderness





- Extremities Exam


Extremities Exam: Full ROM, Normal Capillary Refill, Normal Inspection.  absent

: Joint Swelling, Pedal Edema





- Back Exam


Back Exam: NORMAL INSPECTION





- Neurological Exam


Neurological Exam: Alert, Awake, CN II-XII Intact, Normal Gait, Oriented x3





- Psychiatric Exam


Psychiatric exam: Normal Affect, Normal Mood





- Skin


Skin Exam: Dry, Intact, Normal Color, Warm





Assessment and Plan


(1) DVT prophylaxis


Status: Chronic   





(2) Scrotal edema


Status: Chronic   





(3) CAD (coronary artery disease)


Status: Chronic   





(4) Smoking


Status: Chronic   





(5) Low back pain


Status: Chronic   





(6) Prediabetes


Status: Chronic   





(7) Neurocognitive disorder


Status: Chronic

## 2017-10-13 RX ADMIN — DIVALPROEX SODIUM SCH MG: 250 TABLET, DELAYED RELEASE ORAL at 09:37

## 2017-10-13 RX ADMIN — DIVALPROEX SODIUM SCH MG: 250 TABLET, DELAYED RELEASE ORAL at 17:18

## 2017-10-13 NOTE — CP.PCM.PN
Subjective





- Date & Time of Evaluation


Date of Evaluation: 10/13/17


Time of Evaluation: 08:46





- Subjective


Subjective: 





s/o no f/c, n/v/d. no other complaints.calm and cooperative.


a/p cont plan, cont placement, cont all medical management





Objective





- Vital Signs/Intake and Output


Vital Signs (last 24 hours): 


 











Temp Pulse Resp BP Pulse Ox


 


 98 F   82   20   116/80   95 


 


 10/13/17 08:20  10/13/17 08:20  10/13/17 08:20  10/13/17 08:20  10/13/17 08:20











- Medications


Medications: 


 Current Medications





Atorvastatin Calcium (Lipitor)  20 mg PO HS [facility]


   Last Admin: 10/12/17 21:28 Dose:  20 mg


Divalproex Sodium (Depakote Dr(*Bid*))  500 mg PO BID [facility]


   Last Admin: 10/12/17 16:59 Dose:  500 mg


Enalapril Maleate (Vasotec)  10 mg PO DAILY [facility]


   Last Admin: 10/12/17 16:59 Dose:  Not Given


Fenofibrate (Tricor)  145 mg PO DAILY [facility]


   Last Admin: 10/12/17 08:21 Dose:  145 mg


Metoprolol Tartrate (Lopressor)  50 mg PO Q12 [facility]


   Last Admin: 10/12/17 21:28 Dose:  50 mg


Quetiapine Fumarate (Seroquel)  25 mg PO HS [facility]


   Last Admin: 10/12/17 21:28 Dose:  25 mg











- Labs


Labs: 


 





 07/13/17 04:20 





 07/13/17 04:20 





 











PT  11.2 SECONDS (9.4-12.0)   12/14/15  05:20    


 


INR  1.05  (0.89-1.20)   12/14/15  05:20    


 


APTT  36.2 SECONDS (23.1-32.0)  H  12/14/15  05:20    














- Constitutional


Appears: Well, Non-toxic, No Acute Distress





- Head Exam


Head Exam: ATRAUMATIC, NORMAL INSPECTION, NORMOCEPHALIC





- Eye Exam


Eye Exam: EOMI, Normal appearance, PERRL


Pupil Exam: NORMAL ACCOMODATION, PERRL





- ENT Exam


ENT Exam: Mucous Membranes Moist, Normal Exam





- Neck Exam


Neck Exam: Full ROM, Normal Inspection.  absent: Lymphadenopathy





- Respiratory Exam


Respiratory Exam: Clear to Ausculation Bilateral, NORMAL BREATHING PATTERN





- Cardiovascular Exam


Cardiovascular Exam: REGULAR RHYTHM, RRR, +S1, +S2.  absent: Murmur





- GI/Abdominal Exam


GI & Abdominal Exam: Soft, Normal Bowel Sounds.  absent: Tenderness





- Extremities Exam


Extremities Exam: Full ROM, Normal Capillary Refill, Normal Inspection.  absent

: Joint Swelling, Pedal Edema





- Back Exam


Back Exam: NORMAL INSPECTION





- Neurological Exam


Neurological Exam: Alert, Awake, CN II-XII Intact, Normal Gait, Oriented x3





- Psychiatric Exam


Psychiatric exam: Normal Affect, Normal Mood





- Skin


Skin Exam: Dry, Intact, Normal Color, Warm





Assessment and Plan


(1) DVT prophylaxis


Status: Chronic   





(2) Scrotal edema


Status: Chronic   





(3) CAD (coronary artery disease)


Status: Chronic   





(4) Smoking


Status: Chronic   





(5) Low back pain


Status: Chronic   





(6) Prediabetes


Status: Chronic   





(7) Neurocognitive disorder


Status: Chronic

## 2017-10-14 RX ADMIN — DIVALPROEX SODIUM SCH MG: 250 TABLET, DELAYED RELEASE ORAL at 10:33

## 2017-10-14 RX ADMIN — DIVALPROEX SODIUM SCH MG: 250 TABLET, DELAYED RELEASE ORAL at 17:34

## 2017-10-14 NOTE — CP.PCM.PN
Subjective





- Date & Time of Evaluation


Date of Evaluation: 10/14/17


Time of Evaluation: 08:32





- Subjective


Subjective: 





s/o no f/c, n/v/d. no other complaints.calm and cooperative.


a/p cont plan, cont placement, cont all medical management





Objective





- Vital Signs/Intake and Output


Vital Signs (last 24 hours): 


 











Temp Pulse Resp BP Pulse Ox


 


 97.3 F L  67   19   103/67   96 


 


 10/14/17 08:02  10/14/17 08:02  10/14/17 08:02  10/14/17 08:02  10/14/17 08:02











- Medications


Medications: 


 Current Medications





Atorvastatin Calcium (Lipitor)  20 mg PO HS [facility]


   Last Admin: 10/13/17 21:50 Dose:  20 mg


Divalproex Sodium (Depakote Dr(*Bid*))  500 mg PO BID [facility]


   Last Admin: 10/13/17 17:18 Dose:  500 mg


Enalapril Maleate (Vasotec)  10 mg PO DAILY [facility]


   Last Admin: 10/13/17 09:39 Dose:  10 mg


Fenofibrate (Tricor)  145 mg PO DAILY [facility]


   Last Admin: 10/13/17 09:39 Dose:  145 mg


Metoprolol Tartrate (Lopressor)  50 mg PO Q12 [facility]


   Last Admin: 10/13/17 21:50 Dose:  50 mg


Quetiapine Fumarate (Seroquel)  25 mg PO HS [facility]


   Last Admin: 10/13/17 21:50 Dose:  25 mg











- Labs


Labs: 


 





 07/13/17 04:20 





 07/13/17 04:20 





 











PT  11.2 SECONDS (9.4-12.0)   12/14/15  05:20    


 


INR  1.05  (0.89-1.20)   12/14/15  05:20    


 


APTT  36.2 SECONDS (23.1-32.0)  H  12/14/15  05:20    














- Constitutional


Appears: Well, Non-toxic, No Acute Distress





- Head Exam


Head Exam: ATRAUMATIC, NORMAL INSPECTION, NORMOCEPHALIC





- Eye Exam


Eye Exam: EOMI, Normal appearance, PERRL


Pupil Exam: NORMAL ACCOMODATION, PERRL





- ENT Exam


ENT Exam: Mucous Membranes Moist, Normal Exam





- Neck Exam


Neck Exam: Full ROM, Normal Inspection.  absent: Lymphadenopathy





- Respiratory Exam


Respiratory Exam: Clear to Ausculation Bilateral, NORMAL BREATHING PATTERN





- Cardiovascular Exam


Cardiovascular Exam: REGULAR RHYTHM, RRR, +S1, +S2.  absent: Murmur





- GI/Abdominal Exam


GI & Abdominal Exam: Soft, Normal Bowel Sounds.  absent: Tenderness





- Extremities Exam


Extremities Exam: Full ROM, Normal Capillary Refill, Normal Inspection.  absent

: Joint Swelling, Pedal Edema





- Back Exam


Back Exam: NORMAL INSPECTION





- Neurological Exam


Neurological Exam: Alert, Awake, CN II-XII Intact, Normal Gait, Oriented x3





- Psychiatric Exam


Psychiatric exam: Normal Affect, Normal Mood





- Skin


Skin Exam: Dry, Intact, Normal Color, Warm





Assessment and Plan


(1) DVT prophylaxis


Status: Chronic   





(2) Scrotal edema


Status: Chronic   





(3) CAD (coronary artery disease)


Status: Chronic   





(4) Smoking


Status: Chronic   





(5) Low back pain


Status: Chronic   





(6) Prediabetes


Status: Chronic   





(7) Neurocognitive disorder


Status: Chronic

## 2017-10-15 RX ADMIN — DIVALPROEX SODIUM SCH MG: 250 TABLET, DELAYED RELEASE ORAL at 16:14

## 2017-10-15 RX ADMIN — DIVALPROEX SODIUM SCH MG: 250 TABLET, DELAYED RELEASE ORAL at 09:17

## 2017-10-15 NOTE — CP.PCM.PN
Subjective





- Date & Time of Evaluation


Date of Evaluation: 10/15/17


Time of Evaluation: 09:13





- Subjective


Subjective: 





s/o no f/c, n/v/d. no other complaints.calm and cooperative.


a/p cont plan, cont placement, cont all medical management





Objective





- Vital Signs/Intake and Output


Vital Signs (last 24 hours): 


 











Temp Pulse Resp BP Pulse Ox


 


 97.9 F   68   18   110/75   98 


 


 10/15/17 08:00  10/15/17 08:00  10/15/17 08:00  10/15/17 08:00  10/15/17 08:00











- Medications


Medications: 


 Current Medications





Atorvastatin Calcium (Lipitor)  20 mg PO HS [facility]


   Last Admin: 10/14/17 21:07 Dose:  20 mg


Divalproex Sodium (Depakote Dr(*Bid*))  500 mg PO BID [facility]


   Last Admin: 10/14/17 17:34 Dose:  500 mg


Enalapril Maleate (Vasotec)  10 mg PO DAILY [facility]


   Last Admin: 10/14/17 09:03 Dose:  10 mg


Fenofibrate (Tricor)  145 mg PO DAILY [facility]


   Last Admin: 10/14/17 09:03 Dose:  145 mg


Metoprolol Tartrate (Lopressor)  50 mg PO Q12 [facility]


   Last Admin: 10/14/17 21:07 Dose:  50 mg


Quetiapine Fumarate (Seroquel)  25 mg PO HS [facility]


   Last Admin: 10/14/17 21:07 Dose:  25 mg











- Labs


Labs: 


 





 07/13/17 04:20 





 07/13/17 04:20 





 











PT  11.2 SECONDS (9.4-12.0)   12/14/15  05:20    


 


INR  1.05  (0.89-1.20)   12/14/15  05:20    


 


APTT  36.2 SECONDS (23.1-32.0)  H  12/14/15  05:20    














- Constitutional


Appears: Well, Non-toxic, No Acute Distress





- Head Exam


Head Exam: ATRAUMATIC, NORMAL INSPECTION, NORMOCEPHALIC





- Eye Exam


Eye Exam: EOMI, Normal appearance, PERRL


Pupil Exam: NORMAL ACCOMODATION, PERRL





- ENT Exam


ENT Exam: Mucous Membranes Moist, Normal Exam





- Neck Exam


Neck Exam: Full ROM, Normal Inspection.  absent: Lymphadenopathy





- Respiratory Exam


Respiratory Exam: Clear to Ausculation Bilateral, NORMAL BREATHING PATTERN





- Cardiovascular Exam


Cardiovascular Exam: REGULAR RHYTHM, RRR, +S1, +S2.  absent: Murmur





- GI/Abdominal Exam


GI & Abdominal Exam: Soft, Normal Bowel Sounds.  absent: Tenderness





- Rectal Exam


Rectal Exam: NORMAL INSPECTION





- Extremities Exam


Extremities Exam: Full ROM, Normal Capillary Refill, Normal Inspection.  absent

: Joint Swelling, Pedal Edema





- Back Exam


Back Exam: NORMAL INSPECTION





- Neurological Exam


Neurological Exam: Alert, Awake, CN II-XII Intact, Normal Gait, Oriented x3





- Psychiatric Exam


Psychiatric exam: Normal Affect, Normal Mood





- Skin


Skin Exam: Dry, Intact, Normal Color, Warm





Assessment and Plan


(1) DVT prophylaxis


Status: Chronic   





(2) Scrotal edema


Status: Chronic   





(3) CAD (coronary artery disease)


Status: Chronic   





(4) Smoking


Status: Chronic   





(5) Low back pain


Status: Chronic   





(6) Prediabetes


Status: Chronic   





(7) Neurocognitive disorder


Status: Chronic

## 2017-10-16 RX ADMIN — DIVALPROEX SODIUM SCH MG: 250 TABLET, DELAYED RELEASE ORAL at 10:11

## 2017-10-16 RX ADMIN — DIVALPROEX SODIUM SCH MG: 250 TABLET, DELAYED RELEASE ORAL at 17:24

## 2017-10-16 NOTE — CP.PCM.PN
Subjective





- Date & Time of Evaluation


Date of Evaluation: 10/16/17


Time of Evaluation: 09:11





- Subjective


Subjective: 





s/o no f/c, n/v/d. no other complaints.calm and cooperative.


a/p cont plan, cont placement, cont all medical management





Objective





- Vital Signs/Intake and Output


Vital Signs (last 24 hours): 


 











Temp Pulse Resp BP Pulse Ox


 


 98.1 F   68   20   110/68   97 


 


 10/16/17 08:16  10/16/17 08:16  10/16/17 08:16  10/16/17 08:16  10/16/17 08:16











- Medications


Medications: 


 Current Medications





Atorvastatin Calcium (Lipitor)  20 mg PO HS [facility]


   Last Admin: 10/15/17 21:27 Dose:  20 mg


Divalproex Sodium (Depakote Dr(*Bid*))  500 mg PO BID [facility]


   Last Admin: 10/15/17 16:14 Dose:  500 mg


Enalapril Maleate (Vasotec)  10 mg PO DAILY [facility]


   Last Admin: 10/15/17 09:17 Dose:  10 mg


Fenofibrate (Tricor)  145 mg PO DAILY [facility]


   Last Admin: 10/15/17 09:17 Dose:  145 mg


Metoprolol Tartrate (Lopressor)  50 mg PO Q12 [facility]


   Last Admin: 10/15/17 20:21 Dose:  50 mg


Quetiapine Fumarate (Seroquel)  25 mg PO HS [facility]


   Last Admin: 10/15/17 21:27 Dose:  25 mg











- Labs


Labs: 


 





 07/13/17 04:20 





 07/13/17 04:20 





 











PT  11.2 SECONDS (9.4-12.0)   12/14/15  05:20    


 


INR  1.05  (0.89-1.20)   12/14/15  05:20    


 


APTT  36.2 SECONDS (23.1-32.0)  H  12/14/15  05:20    














- Constitutional


Appears: Well, Non-toxic, No Acute Distress





- Head Exam


Head Exam: ATRAUMATIC, NORMAL INSPECTION, NORMOCEPHALIC





- Eye Exam


Eye Exam: EOMI, Normal appearance, PERRL


Pupil Exam: NORMAL ACCOMODATION, PERRL





- ENT Exam


ENT Exam: Mucous Membranes Moist, Normal Exam





- Neck Exam


Neck Exam: Full ROM, Normal Inspection.  absent: Lymphadenopathy





- Respiratory Exam


Respiratory Exam: Clear to Ausculation Bilateral, NORMAL BREATHING PATTERN





- Cardiovascular Exam


Cardiovascular Exam: REGULAR RHYTHM, RRR, +S1, +S2.  absent: Murmur





- GI/Abdominal Exam


GI & Abdominal Exam: Soft, Normal Bowel Sounds.  absent: Tenderness





-  Exam


Speculum exam: NORMAL SPECULUM EXAM





- Extremities Exam


Extremities Exam: Full ROM, Normal Capillary Refill, Normal Inspection.  absent

: Joint Swelling, Pedal Edema





- Back Exam


Back Exam: NORMAL INSPECTION





- Neurological Exam


Neurological Exam: Alert, Awake, CN II-XII Intact, Normal Gait, Oriented x3





- Psychiatric Exam


Psychiatric exam: Normal Affect, Normal Mood





- Skin


Skin Exam: Dry, Intact, Normal Color, Warm





Assessment and Plan


(1) DVT prophylaxis


Status: Chronic   





(2) Scrotal edema


Status: Chronic   





(3) CAD (coronary artery disease)


Status: Chronic   





(4) Smoking


Status: Chronic   





(5) Low back pain


Status: Chronic   





(6) Prediabetes


Status: Chronic   





(7) Neurocognitive disorder


Status: Chronic

## 2017-10-17 RX ADMIN — DIVALPROEX SODIUM SCH MG: 250 TABLET, DELAYED RELEASE ORAL at 16:46

## 2017-10-17 RX ADMIN — DIVALPROEX SODIUM SCH MG: 250 TABLET, DELAYED RELEASE ORAL at 08:16

## 2017-10-17 NOTE — CP.PCM.PN
Subjective





- Date & Time of Evaluation


Date of Evaluation: 10/17/17


Time of Evaluation: 07:33





- Subjective


Subjective: 





s/o no f/c, n/v/d. no other complaints.calm and cooperative.


a/p cont plan, cont placement, cont all medical management








Objective





- Vital Signs/Intake and Output


Vital Signs (last 24 hours): 


 











Temp Pulse Resp BP Pulse Ox


 


 98.2 F   72   20   102/66   98 


 


 10/17/17 00:41  10/17/17 00:41  10/17/17 00:41  10/17/17 00:41  10/17/17 00:41











- Medications


Medications: 


 Current Medications





Atorvastatin Calcium (Lipitor)  20 mg PO HS [facility]


   Last Admin: 10/16/17 21:08 Dose:  20 mg


Divalproex Sodium (Depakote Dr(*Bid*))  500 mg PO BID [facility]


   Last Admin: 10/16/17 17:24 Dose:  500 mg


Enalapril Maleate (Vasotec)  10 mg PO DAILY [facility]


   Last Admin: 10/16/17 10:05 Dose:  10 mg


Fenofibrate (Tricor)  145 mg PO DAILY [facility]


   Last Admin: 10/16/17 10:05 Dose:  145 mg


Metoprolol Tartrate (Lopressor)  50 mg PO Q12 [facility]


   Last Admin: 10/16/17 21:08 Dose:  50 mg


Quetiapine Fumarate (Seroquel)  25 mg PO HS [facility]


   Last Admin: 10/16/17 21:08 Dose:  25 mg











- Labs


Labs: 


 





 07/13/17 04:20 





 07/13/17 04:20 





 











PT  11.2 SECONDS (9.4-12.0)   12/14/15  05:20    


 


INR  1.05  (0.89-1.20)   12/14/15  05:20    


 


APTT  36.2 SECONDS (23.1-32.0)  H  12/14/15  05:20    














- Constitutional


Appears: Well, Non-toxic, No Acute Distress





- Head Exam


Head Exam: ATRAUMATIC, NORMAL INSPECTION, NORMOCEPHALIC





- Eye Exam


Eye Exam: EOMI, Normal appearance, PERRL


Pupil Exam: NORMAL ACCOMODATION, PERRL





- ENT Exam


ENT Exam: Mucous Membranes Moist, Normal Exam





- Neck Exam


Neck Exam: Full ROM, Normal Inspection.  absent: Lymphadenopathy





- Respiratory Exam


Respiratory Exam: Clear to Ausculation Bilateral, NORMAL BREATHING PATTERN





- Cardiovascular Exam


Cardiovascular Exam: REGULAR RHYTHM, RRR, +S1, +S2.  absent: Murmur





- GI/Abdominal Exam


GI & Abdominal Exam: Soft, Normal Bowel Sounds.  absent: Tenderness





- Extremities Exam


Extremities Exam: Full ROM, Normal Capillary Refill, Normal Inspection.  absent

: Joint Swelling, Pedal Edema





- Back Exam


Back Exam: NORMAL INSPECTION





- Neurological Exam


Neurological Exam: Alert, Awake, CN II-XII Intact, Normal Gait, Oriented x3





- Psychiatric Exam


Psychiatric exam: Normal Affect, Normal Mood





- Skin


Skin Exam: Dry, Intact, Normal Color, Warm





Assessment and Plan


(1) DVT prophylaxis


Status: Chronic   





(2) Scrotal edema


Status: Chronic   





(3) CAD (coronary artery disease)


Status: Chronic   





(4) Smoking


Status: Chronic   





(5) Low back pain


Status: Chronic   





(6) Prediabetes


Status: Chronic   





(7) Neurocognitive disorder


Status: Chronic

## 2017-10-18 RX ADMIN — DIVALPROEX SODIUM SCH MG: 250 TABLET, DELAYED RELEASE ORAL at 09:56

## 2017-10-18 RX ADMIN — DIVALPROEX SODIUM SCH MG: 250 TABLET, DELAYED RELEASE ORAL at 16:59

## 2017-10-18 NOTE — CP.PCM.PN
Subjective





- Date & Time of Evaluation


Date of Evaluation: 10/18/17


Time of Evaluation: 08:21





- Subjective


Subjective: 





s/o no f/c, n/v/d. no other complaints.calm and cooperative.


a/p cont plan, cont placement, cont all medical management





Objective





- Vital Signs/Intake and Output


Vital Signs (last 24 hours): 


 











Temp Pulse Resp BP Pulse Ox


 


 97.5 F L  77   19   107/70   97 


 


 10/18/17 08:13  10/18/17 08:13  10/18/17 08:13  10/18/17 08:13  10/18/17 08:13











- Medications


Medications: 


 Current Medications





Atorvastatin Calcium (Lipitor)  20 mg PO HS [facility]


   Last Admin: 10/17/17 21:42 Dose:  20 mg


Divalproex Sodium (Depakote Dr(*Bid*))  500 mg PO BID [facility]


   Last Admin: 10/17/17 16:46 Dose:  500 mg


Enalapril Maleate (Vasotec)  10 mg PO DAILY [facility]


   Last Admin: 10/17/17 08:17 Dose:  10 mg


Fenofibrate (Tricor)  145 mg PO DAILY [facility]


   Last Admin: 10/17/17 08:17 Dose:  145 mg


Metoprolol Tartrate (Lopressor)  50 mg PO Q12 [facility]


   Last Admin: 10/17/17 21:42 Dose:  Not Given


Quetiapine Fumarate (Seroquel)  25 mg PO HS [facility]


   Last Admin: 10/17/17 21:41 Dose:  25 mg











- Labs


Labs: 


 





 07/13/17 04:20 





 07/13/17 04:20 





 











PT  11.2 SECONDS (9.4-12.0)   12/14/15  05:20    


 


INR  1.05  (0.89-1.20)   12/14/15  05:20    


 


APTT  36.2 SECONDS (23.1-32.0)  H  12/14/15  05:20    














- Constitutional


Appears: Well, Non-toxic, No Acute Distress





- Head Exam


Head Exam: ATRAUMATIC, NORMAL INSPECTION, NORMOCEPHALIC





- Eye Exam


Eye Exam: EOMI, Normal appearance, PERRL


Pupil Exam: NORMAL ACCOMODATION, PERRL





- ENT Exam


ENT Exam: Mucous Membranes Moist, Normal Exam





- Neck Exam


Neck Exam: Full ROM, Normal Inspection.  absent: Lymphadenopathy





- Respiratory Exam


Respiratory Exam: Clear to Ausculation Bilateral, NORMAL BREATHING PATTERN





- Cardiovascular Exam


Cardiovascular Exam: REGULAR RHYTHM, RRR, +S1, +S2.  absent: Murmur





- GI/Abdominal Exam


GI & Abdominal Exam: Soft, Normal Bowel Sounds.  absent: Tenderness





- Extremities Exam


Extremities Exam: Full ROM, Normal Capillary Refill, Normal Inspection.  absent

: Joint Swelling, Pedal Edema





- Back Exam


Back Exam: NORMAL INSPECTION





- Neurological Exam


Neurological Exam: Alert, Awake, CN II-XII Intact, Normal Gait, Oriented x3





- Psychiatric Exam


Psychiatric exam: Normal Affect, Normal Mood





- Skin


Skin Exam: Dry, Intact, Normal Color, Warm





Assessment and Plan


(1) DVT prophylaxis


Status: Chronic   





(2) Scrotal edema


Status: Chronic   





(3) CAD (coronary artery disease)


Status: Chronic   





(4) Smoking


Status: Chronic   





(5) Low back pain


Status: Chronic   





(6) Prediabetes


Status: Chronic   





(7) Neurocognitive disorder


Status: Chronic

## 2017-10-19 RX ADMIN — DIVALPROEX SODIUM SCH MG: 250 TABLET, DELAYED RELEASE ORAL at 09:42

## 2017-10-19 RX ADMIN — DIVALPROEX SODIUM SCH MG: 250 TABLET, DELAYED RELEASE ORAL at 16:41

## 2017-10-19 NOTE — CP.PCM.PN
Subjective





- Date & Time of Evaluation


Date of Evaluation: 10/19/17


Time of Evaluation: 07:33





- Subjective


Subjective: 





s/o no f/c, n/v/d. no other complaints.calm and cooperative.


a/p cont plan, cont placement, cont all medical management





Objective





- Vital Signs/Intake and Output


Vital Signs (last 24 hours): 


 











Temp Pulse Resp BP Pulse Ox


 


 97.7 F   72   20   103/68   97 


 


 10/19/17 00:33  10/19/17 00:33  10/19/17 00:33  10/19/17 00:33  10/19/17 00:33











- Medications


Medications: 


 Current Medications





Atorvastatin Calcium (Lipitor)  [facility]


   Last Admin: 10/18/17 21:10 Dose:  20 mg


Divalproex Sodium (Depakote Dr(*Bid*))  500 mg PO BID [facility]


   Last Admin: 10/18/17 16:59 Dose:  500 mg


Enalapril Maleate (Vasotec)  10 mg PO DAILY [facility]


   Last Admin: 10/18/17 09:58 Dose:  10 mg


Fenofibrate (Tricor)  145 mg PO DAILY [facility]


   Last Admin: 10/18/17 09:58 Dose:  145 mg


Metoprolol Tartrate (Lopressor)  50 mg PO Q12 [facility]


   Last Admin: 10/18/17 21:10 Dose:  50 mg


Quetiapine Fumarate (Seroquel)  25 mg PO HS [facility]


   Last Admin: 10/18/17 21:10 Dose:  25 mg











- Labs


Labs: 


 





 07/13/17 04:20 





 07/13/17 04:20 





 











PT  11.2 SECONDS (9.4-12.0)   12/14/15  05:20    


 


INR  1.05  (0.89-1.20)   12/14/15  05:20    


 


APTT  36.2 SECONDS (23.1-32.0)  H  12/14/15  05:20    














- Constitutional


Appears: Well, Non-toxic, No Acute Distress





- Head Exam


Head Exam: ATRAUMATIC, NORMAL INSPECTION, NORMOCEPHALIC





- Eye Exam


Eye Exam: EOMI, Normal appearance, PERRL


Pupil Exam: NORMAL ACCOMODATION, PERRL





- ENT Exam


ENT Exam: Mucous Membranes Moist, Normal Exam





- Neck Exam


Neck Exam: Full ROM, Normal Inspection.  absent: Lymphadenopathy





- Respiratory Exam


Respiratory Exam: Clear to Ausculation Bilateral, NORMAL BREATHING PATTERN





- Cardiovascular Exam


Cardiovascular Exam: REGULAR RHYTHM, RRR, +S1, +S2.  absent: Murmur





- GI/Abdominal Exam


GI & Abdominal Exam: Soft, Normal Bowel Sounds.  absent: Tenderness





- Extremities Exam


Extremities Exam: Full ROM, Normal Capillary Refill, Normal Inspection.  absent

: Joint Swelling, Pedal Edema





- Back Exam


Back Exam: NORMAL INSPECTION





- Neurological Exam


Neurological Exam: Alert, Awake, CN II-XII Intact, Normal Gait, Oriented x3





- Psychiatric Exam


Psychiatric exam: Normal Affect, Normal Mood





- Skin


Skin Exam: Dry, Intact, Normal Color, Warm





Assessment and Plan


(1) DVT prophylaxis


Status: Chronic   





(2) Scrotal edema


Status: Chronic   





(3) CAD (coronary artery disease)


Status: Chronic   





(4) Smoking


Status: Chronic   





(5) Low back pain


Status: Chronic   





(6) Prediabetes


Status: Chronic   





(7) Neurocognitive disorder


Status: Chronic

## 2017-10-20 RX ADMIN — DIVALPROEX SODIUM SCH MG: 250 TABLET, DELAYED RELEASE ORAL at 18:04

## 2017-10-20 RX ADMIN — DIVALPROEX SODIUM SCH MG: 250 TABLET, DELAYED RELEASE ORAL at 10:32

## 2017-10-20 NOTE — CP.PCM.PN
Subjective





- Date & Time of Evaluation


Date of Evaluation: 10/20/17


Time of Evaluation: 07:44





- Subjective


Subjective: 





s/o no f/c, n/v/d. no other complaints.calm and cooperative.


a/p cont plan, cont placement, cont all medical management





Objective





- Vital Signs/Intake and Output


Vital Signs (last 24 hours): 


 











Temp Pulse Resp BP Pulse Ox


 


 98.7 F   75   17   112/65   96 


 


 10/19/17 21:25  10/19/17 21:25  10/19/17 21:25  10/19/17 21:25  10/19/17 21:25











- Medications


Medications: 


 Current Medications





Atorvastatin Calcium (Lipitor)  20 mg PO HS [facility]


   Last Admin: 10/19/17 22:06 Dose:  20 mg


Divalproex Sodium (Depakote Dr(*Bid*))  500 mg PO BID [facility]


   Last Admin: 10/19/17 16:41 Dose:  500 mg


Enalapril Maleate (Vasotec)  10 mg PO DAILY [facility]


   Last Admin: 10/19/17 09:42 Dose:  10 mg


Fenofibrate (Tricor)  145 mg PO DAILY [facility]


   Last Admin: 10/19/17 09:41 Dose:  145 mg


Metoprolol Tartrate (Lopressor)  50 mg PO Q12 [facility]


   Last Admin: 10/19/17 21:25 Dose:  50 mg


Quetiapine Fumarate (Seroquel)  25 mg PO HS [facility]


   Last Admin: 10/19/17 22:05 Dose:  25 mg











- Labs


Labs: 


 





 07/13/17 04:20 





 07/13/17 04:20 





 











PT  11.2 SECONDS (9.4-12.0)   12/14/15  05:20    


 


INR  1.05  (0.89-1.20)   12/14/15  05:20    


 


APTT  36.2 SECONDS (23.1-32.0)  H  12/14/15  05:20    














- Constitutional


Appears: Well, Non-toxic, No Acute Distress





- Head Exam


Head Exam: ATRAUMATIC, NORMAL INSPECTION, NORMOCEPHALIC





- Eye Exam


Eye Exam: EOMI, Normal appearance, PERRL


Pupil Exam: NORMAL ACCOMODATION, PERRL





- ENT Exam


ENT Exam: Mucous Membranes Moist, Normal Exam





- Neck Exam


Neck Exam: Full ROM, Normal Inspection.  absent: Lymphadenopathy





- Respiratory Exam


Respiratory Exam: Clear to Ausculation Bilateral, NORMAL BREATHING PATTERN





- Cardiovascular Exam


Cardiovascular Exam: REGULAR RHYTHM, RRR, +S1, +S2.  absent: Murmur





- GI/Abdominal Exam


GI & Abdominal Exam: Soft, Normal Bowel Sounds.  absent: Tenderness





- Extremities Exam


Extremities Exam: Full ROM, Normal Capillary Refill, Normal Inspection.  absent

: Joint Swelling, Pedal Edema





- Back Exam


Back Exam: NORMAL INSPECTION





- Neurological Exam


Neurological Exam: Alert, Awake, CN II-XII Intact, Normal Gait, Oriented x3





- Psychiatric Exam


Psychiatric exam: Normal Affect, Normal Mood





- Skin


Skin Exam: Dry, Intact, Normal Color, Warm





Assessment and Plan


(1) DVT prophylaxis


Status: Chronic   





(2) Scrotal edema


Status: Chronic   





(3) CAD (coronary artery disease)


Status: Chronic   





(4) Smoking


Status: Chronic   





(5) Low back pain


Status: Chronic   





(6) Prediabetes


Status: Chronic   





(7) Neurocognitive disorder


Status: Chronic

## 2017-10-21 RX ADMIN — DIVALPROEX SODIUM SCH MG: 250 TABLET, DELAYED RELEASE ORAL at 09:44

## 2017-10-21 RX ADMIN — DIVALPROEX SODIUM SCH MG: 250 TABLET, DELAYED RELEASE ORAL at 16:29

## 2017-10-21 NOTE — CP.PCM.PN
Subjective





- Date & Time of Evaluation


Date of Evaluation: 10/21/17


Time of Evaluation: 10:21





- Subjective


Subjective: 





s/o no f/c, n/v/d. no other complaints.calm and cooperative.


a/p cont plan, cont placement, cont all medical management





Objective





- Vital Signs/Intake and Output


Vital Signs (last 24 hours): 


 











Temp Pulse Resp BP Pulse Ox


 


 97.8 F   61   20   113/71   99 


 


 10/21/17 08:57  10/21/17 09:46  10/21/17 08:57  10/21/17 09:46  10/21/17 08:57











- Medications


Medications: 


 Current Medications





Atorvastatin Calcium (Lipitor)  20 mg PO HS [facility]


   Last Admin: 10/20/17 21:28 Dose:  20 mg


Divalproex Sodium (Depakote Dr(*Bid*))  500 mg PO BID [facility]


   Last Admin: 10/21/17 09:44 Dose:  500 mg


Enalapril Maleate (Vasotec)  10 mg PO DAILY [facility]


   Last Admin: 10/21/17 09:45 Dose:  10 mg


Fenofibrate (Tricor)  145 mg PO DAILY [facility]


   Last Admin: 10/21/17 09:44 Dose:  145 mg


Metoprolol Tartrate (Lopressor)  50 mg PO Q12 [facility]


   Last Admin: 10/21/17 09:46 Dose:  50 mg


Quetiapine Fumarate (Seroquel)  25 mg PO HS [facility]


   Last Admin: 10/20/17 21:28 Dose:  25 mg











- Labs


Labs: 


 





 07/13/17 04:20 





 07/13/17 04:20 





 











PT  11.2 SECONDS (9.4-12.0)   12/14/15  05:20    


 


INR  1.05  (0.89-1.20)   12/14/15  05:20    


 


APTT  36.2 SECONDS (23.1-32.0)  H  12/14/15  05:20    














- Constitutional


Appears: Well, Non-toxic, No Acute Distress





- Head Exam


Head Exam: ATRAUMATIC, NORMAL INSPECTION, NORMOCEPHALIC





- Eye Exam


Eye Exam: EOMI, Normal appearance, PERRL


Pupil Exam: NORMAL ACCOMODATION, PERRL





- ENT Exam


ENT Exam: Mucous Membranes Moist, Normal Exam





- Neck Exam


Neck Exam: Full ROM, Normal Inspection.  absent: Lymphadenopathy





- Respiratory Exam


Respiratory Exam: Clear to Ausculation Bilateral, NORMAL BREATHING PATTERN





- Cardiovascular Exam


Cardiovascular Exam: REGULAR RHYTHM, RRR, +S1, +S2.  absent: Murmur





- GI/Abdominal Exam


GI & Abdominal Exam: Soft, Normal Bowel Sounds.  absent: Tenderness





- Extremities Exam


Extremities Exam: Full ROM, Normal Capillary Refill, Normal Inspection.  absent

: Joint Swelling, Pedal Edema





- Back Exam


Back Exam: NORMAL INSPECTION





- Neurological Exam


Neurological Exam: Alert, Awake, CN II-XII Intact, Normal Gait, Oriented x3





- Psychiatric Exam


Psychiatric exam: Normal Affect, Normal Mood





- Skin


Skin Exam: Dry, Intact, Normal Color, Warm





Assessment and Plan


(1) DVT prophylaxis


Status: Chronic   





(2) Scrotal edema


Status: Chronic   





(3) CAD (coronary artery disease)


Status: Chronic   





(4) Smoking


Status: Chronic   





(5) Low back pain


Status: Chronic   





(6) Prediabetes


Status: Chronic   





(7) Neurocognitive disorder


Status: Chronic

## 2017-10-22 RX ADMIN — DIVALPROEX SODIUM SCH MG: 250 TABLET, DELAYED RELEASE ORAL at 11:06

## 2017-10-22 RX ADMIN — DIVALPROEX SODIUM SCH MG: 250 TABLET, DELAYED RELEASE ORAL at 16:41

## 2017-10-22 NOTE — CP.PCM.PN
Subjective





- Date & Time of Evaluation


Date of Evaluation: 10/22/17


Time of Evaluation: 11:28





- Subjective


Subjective: 





s/o no f/c, n/v/d. no other complaints.calm and cooperative.


a/p cont plan, cont placement, cont all medical management





Objective





- Vital Signs/Intake and Output


Vital Signs (last 24 hours): 


 











Temp Pulse Resp BP Pulse Ox


 


 98.1 F   63   20   102/72   99 


 


 10/22/17 08:27  10/22/17 11:06  10/22/17 08:27  10/22/17 11:06  10/22/17 08:27











- Medications


Medications: 


 Current Medications





Atorvastatin Calcium (Lipitor)  20 mg PO HS [facility]


   Last Admin: 10/21/17 21:25 Dose:  20 mg


Divalproex Sodium (Depakote Dr(*Bid*))  500 mg PO BID [facility]


   Last Admin: 10/22/17 11:06 Dose:  500 mg


Enalapril Maleate (Vasotec)  10 mg PO DAILY [facility]


   Last Admin: 10/22/17 11:06 Dose:  10 mg


Fenofibrate (Tricor)  145 mg PO DAILY [facility]


   Last Admin: 10/22/17 11:07 Dose:  145 mg


Metoprolol Tartrate (Lopressor)  50 mg PO Q12 [facility]


   Last Admin: 10/22/17 11:06 Dose:  50 mg


Quetiapine Fumarate (Seroquel)  25 mg PO HS [facility]


   Last Admin: 10/21/17 21:25 Dose:  25 mg











- Labs


Labs: 


 





 07/13/17 04:20 





 07/13/17 04:20 





 











PT  11.2 SECONDS (9.4-12.0)   12/14/15  05:20    


 


INR  1.05  (0.89-1.20)   12/14/15  05:20    


 


APTT  36.2 SECONDS (23.1-32.0)  H  12/14/15  05:20    














- Constitutional


Appears: Well, Non-toxic, No Acute Distress





- Head Exam


Head Exam: ATRAUMATIC, NORMAL INSPECTION, NORMOCEPHALIC





- Eye Exam


Eye Exam: EOMI, Normal appearance, PERRL


Pupil Exam: NORMAL ACCOMODATION, PERRL





- ENT Exam


ENT Exam: Mucous Membranes Moist, Normal Exam





- Neck Exam


Neck Exam: Full ROM, Normal Inspection.  absent: Lymphadenopathy





- Respiratory Exam


Respiratory Exam: Clear to Ausculation Bilateral, NORMAL BREATHING PATTERN





- Cardiovascular Exam


Cardiovascular Exam: REGULAR RHYTHM, RRR, +S1, +S2.  absent: Murmur





- GI/Abdominal Exam


GI & Abdominal Exam: Soft, Normal Bowel Sounds.  absent: Tenderness





- Extremities Exam


Extremities Exam: Full ROM, Normal Capillary Refill, Normal Inspection.  absent

: Joint Swelling, Pedal Edema





- Back Exam


Back Exam: NORMAL INSPECTION





- Neurological Exam


Neurological Exam: Alert, Awake, CN II-XII Intact, Normal Gait, Oriented x3





- Psychiatric Exam


Psychiatric exam: Normal Affect, Normal Mood





- Skin


Skin Exam: Dry, Intact, Normal Color, Warm





Assessment and Plan


(1) DVT prophylaxis


Status: Chronic   





(2) Scrotal edema


Status: Chronic   





(3) CAD (coronary artery disease)


Status: Chronic   





(4) Smoking


Status: Chronic   





(5) Low back pain


Status: Chronic   





(6) Prediabetes


Status: Chronic   





(7) Neurocognitive disorder


Status: Chronic

## 2017-10-23 RX ADMIN — DIVALPROEX SODIUM SCH MG: 250 TABLET, DELAYED RELEASE ORAL at 08:46

## 2017-10-23 RX ADMIN — DIVALPROEX SODIUM SCH MG: 250 TABLET, DELAYED RELEASE ORAL at 17:13

## 2017-10-23 NOTE — CP.PCM.PN
Subjective





- Date & Time of Evaluation


Date of Evaluation: 10/23/17


Time of Evaluation: 07:20





- Subjective


Subjective: 





s/o no f/c, n/v/d. no other complaints.calm and cooperative.


a/p cont plan, cont placement, cont all medical management





Objective





- Vital Signs/Intake and Output


Vital Signs (last 24 hours): 


 











Temp Pulse Resp BP Pulse Ox


 


 97.7 F   75   20   96/62 L  95 


 


 10/23/17 00:24  10/23/17 00:24  10/23/17 00:24  10/23/17 00:24  10/23/17 00:24











- Medications


Medications: 


 Current Medications





Atorvastatin Calcium (Lipitor)  20 mg PO [facility]


   Last Admin: 10/22/17 21:25 Dose:  20 mg


Divalproex Sodium (Depakote Dr(*Bid*))  500 mg PO BID [facility]


   Last Admin: 10/22/17 16:41 Dose:  500 mg


Enalapril Maleate (Vasotec)  10 mg PO DAILY [facility]


   Last Admin: 10/22/17 11:06 Dose:  10 mg


Fenofibrate (Tricor)  145 mg PO DAILY [facility]


   Last Admin: 10/22/17 11:07 Dose:  145 mg


Metoprolol Tartrate (Lopressor)  50 mg PO Q12 [facility]


   Last Admin: 10/22/17 21:23 Dose:  Not Given


Quetiapine Fumarate (Seroquel)  25 mg PO HS [facility]


   Last Admin: 10/22/17 21:25 Dose:  25 mg











- Labs


Labs: 


 





 07/13/17 04:20 





 07/13/17 04:20 





 











PT  11.2 SECONDS (9.4-12.0)   12/14/15  05:20    


 


INR  1.05  (0.89-1.20)   12/14/15  05:20    


 


APTT  36.2 SECONDS (23.1-32.0)  H  12/14/15  05:20    














- Constitutional


Appears: Well, Non-toxic, No Acute Distress





- Head Exam


Head Exam: ATRAUMATIC, NORMAL INSPECTION, NORMOCEPHALIC





- Eye Exam


Eye Exam: EOMI, Normal appearance, PERRL


Pupil Exam: NORMAL ACCOMODATION, PERRL





- ENT Exam


ENT Exam: Mucous Membranes Moist, Normal Exam





- Neck Exam


Neck Exam: Full ROM, Normal Inspection.  absent: Lymphadenopathy





- Respiratory Exam


Respiratory Exam: Clear to Ausculation Bilateral, NORMAL BREATHING PATTERN





- Cardiovascular Exam


Cardiovascular Exam: REGULAR RHYTHM, RRR, +S1, +S2.  absent: Murmur





- GI/Abdominal Exam


GI & Abdominal Exam: Soft, Normal Bowel Sounds.  absent: Tenderness





- Extremities Exam


Extremities Exam: Full ROM, Normal Capillary Refill, Normal Inspection.  absent

: Joint Swelling, Pedal Edema





- Back Exam


Back Exam: NORMAL INSPECTION





- Neurological Exam


Neurological Exam: Alert, Awake, CN II-XII Intact, Normal Gait, Oriented x3





- Psychiatric Exam


Psychiatric exam: Normal Affect, Normal Mood





- Skin


Skin Exam: Dry, Intact, Normal Color, Warm





Assessment and Plan


(1) DVT prophylaxis


Status: Chronic   





(2) Scrotal edema


Status: Chronic   





(3) CAD (coronary artery disease)


Status: Chronic   





(4) Smoking


Status: Chronic   





(5) Low back pain


Status: Chronic   





(6) Prediabetes


Status: Chronic   





(7) Neurocognitive disorder


Status: Chronic

## 2017-10-24 RX ADMIN — DIVALPROEX SODIUM SCH MG: 250 TABLET, DELAYED RELEASE ORAL at 17:42

## 2017-10-24 RX ADMIN — DIVALPROEX SODIUM SCH MG: 250 TABLET, DELAYED RELEASE ORAL at 09:33

## 2017-10-24 NOTE — CP.PCM.PN
Subjective





- Date & Time of Evaluation


Date of Evaluation: 10/24/17


Time of Evaluation: 07:21





- Subjective


Subjective: 





s/o no f/c, n/v/d. no other complaints.calm and cooperative.


a/p cont plan, cont placement, cont all medical management








Objective





- Vital Signs/Intake and Output


Vital Signs (last 24 hours): 


 











Temp Pulse Resp BP Pulse Ox


 


 98.1 F   66   19   108/70   96 


 


 10/24/17 00:35  10/24/17 00:35  10/24/17 00:35  10/24/17 00:35  10/24/17 00:35











- Medications


Medications: 


 Current Medications





Atorvastatin Calcium (Lipitor)  20 mg PO [facility]


   Last Admin: 10/23/17 21:01 Dose:  20 mg


Divalproex Sodium (Depakote Dr(*Bid*))  500 mg PO BID [facility]


   Last Admin: 10/23/17 17:13 Dose:  500 mg


Enalapril Maleate (Vasotec)  10 mg PO DAILY [facility]


   Last Admin: 10/23/17 12:21 Dose:  10 mg


Fenofibrate (Tricor)  145 mg PO DAILY [facility]


   Last Admin: 10/23/17 08:47 Dose:  145 mg


Metoprolol Tartrate (Lopressor)  50 mg PO Q12 [facility]


   Last Admin: 10/23/17 21:02 Dose:  50 mg


Quetiapine Fumarate (Seroquel)  25 mg PO HS [facility]


   Last Admin: 10/23/17 21:01 Dose:  25 mg











- Labs


Labs: 


 





 07/13/17 04:20 





 07/13/17 04:20 





 











PT  11.2 SECONDS (9.4-12.0)   12/14/15  05:20    


 


INR  1.05  (0.89-1.20)   12/14/15  05:20    


 


APTT  36.2 SECONDS (23.1-32.0)  H  12/14/15  05:20    














- Constitutional


Appears: Well, Non-toxic, No Acute Distress





- Head Exam


Head Exam: ATRAUMATIC, NORMAL INSPECTION, NORMOCEPHALIC





- Eye Exam


Eye Exam: EOMI, Normal appearance, PERRL


Pupil Exam: NORMAL ACCOMODATION, PERRL





- ENT Exam


ENT Exam: Mucous Membranes Moist, Normal Exam





- Neck Exam


Neck Exam: Full ROM, Normal Inspection.  absent: Lymphadenopathy





- Respiratory Exam


Respiratory Exam: Clear to Ausculation Bilateral, NORMAL BREATHING PATTERN





- Cardiovascular Exam


Cardiovascular Exam: REGULAR RHYTHM, RRR, +S1, +S2.  absent: Murmur





- GI/Abdominal Exam


GI & Abdominal Exam: Soft, Normal Bowel Sounds.  absent: Tenderness





- Extremities Exam


Extremities Exam: Full ROM, Normal Capillary Refill, Normal Inspection.  absent

: Joint Swelling, Pedal Edema





- Back Exam


Back Exam: NORMAL INSPECTION





- Neurological Exam


Neurological Exam: Alert, Awake, CN II-XII Intact, Normal Gait, Oriented x3





- Psychiatric Exam


Psychiatric exam: Normal Affect, Normal Mood





- Skin


Skin Exam: Dry, Intact, Normal Color, Warm





Assessment and Plan


(1) DVT prophylaxis


Status: Chronic   





(2) Scrotal edema


Status: Chronic   





(3) CAD (coronary artery disease)


Status: Chronic   





(4) Smoking


Status: Chronic   





(5) Low back pain


Status: Chronic   





(6) Prediabetes


Status: Chronic   





(7) Neurocognitive disorder


Status: Chronic

## 2017-10-25 RX ADMIN — DIVALPROEX SODIUM SCH MG: 250 TABLET, DELAYED RELEASE ORAL at 09:31

## 2017-10-25 RX ADMIN — DIVALPROEX SODIUM SCH MG: 250 TABLET, DELAYED RELEASE ORAL at 17:48

## 2017-10-25 NOTE — CP.PCM.PN
Subjective





- Date & Time of Evaluation


Date of Evaluation: 10/25/17


Time of Evaluation: 07:40





- Subjective


Subjective: 





s/o no f/c, n/v/d. no other complaints.calm and cooperative.


a/p cont plan, cont placement, cont all medical management





Objective





- Vital Signs/Intake and Output


Vital Signs (last 24 hours): 


 











Temp Pulse Resp BP Pulse Ox


 


 97.6 F   63   19   93/65 L  99 


 


 10/25/17 00:30  10/25/17 00:30  10/25/17 00:30  10/25/17 00:30  10/25/17 00:30











- Medications


Medications: 


 Current Medications





Atorvastatin Calcium (Lipitor)  20 mg PO HS [facility]


   Last Admin: 10/24/17 21:01 Dose:  20 mg


Divalproex Sodium (Depakote Dr(*Bid*))  500 mg PO BID [facility]


   Last Admin: 10/24/17 17:42 Dose:  500 mg


Enalapril Maleate (Vasotec)  10 mg PO DAILY [facility]


   Last Admin: 10/24/17 09:33 Dose:  10 mg


Fenofibrate (Tricor)  145 mg PO DAILY [facility]


   Last Admin: 10/24/17 09:33 Dose:  145 mg


Metoprolol Tartrate (Lopressor)  50 mg PO Q12 [facility]


   Last Admin: 10/24/17 21:00 Dose:  50 mg


Quetiapine Fumarate (Seroquel)  25 mg PO HS [facility]


   Last Admin: 10/24/17 21:01 Dose:  25 mg











- Labs


Labs: 


 





 07/13/17 04:20 





 07/13/17 04:20 





 











PT  11.2 SECONDS (9.4-12.0)   12/14/15  05:20    


 


INR  1.05  (0.89-1.20)   12/14/15  05:20    


 


APTT  36.2 SECONDS (23.1-32.0)  H  12/14/15  05:20    














- Constitutional


Appears: Well, Non-toxic, No Acute Distress





- Head Exam


Head Exam: ATRAUMATIC, NORMAL INSPECTION, NORMOCEPHALIC





- Eye Exam


Eye Exam: EOMI, Normal appearance, PERRL


Pupil Exam: NORMAL ACCOMODATION, PERRL





- ENT Exam


ENT Exam: Mucous Membranes Moist, Normal Exam





- Neck Exam


Neck Exam: Full ROM, Normal Inspection.  absent: Lymphadenopathy





- Respiratory Exam


Respiratory Exam: Clear to Ausculation Bilateral, NORMAL BREATHING PATTERN





- Cardiovascular Exam


Cardiovascular Exam: REGULAR RHYTHM, RRR, +S1, +S2.  absent: Murmur





- GI/Abdominal Exam


GI & Abdominal Exam: Soft, Normal Bowel Sounds.  absent: Tenderness





- Extremities Exam


Extremities Exam: Full ROM, Normal Capillary Refill, Normal Inspection.  absent

: Joint Swelling, Pedal Edema





- Back Exam


Back Exam: NORMAL INSPECTION





- Neurological Exam


Neurological Exam: Alert, Awake, CN II-XII Intact, Normal Gait, Oriented x3





- Psychiatric Exam


Psychiatric exam: Normal Affect, Normal Mood





- Skin


Skin Exam: Dry, Intact, Normal Color, Warm





Assessment and Plan


(1) DVT prophylaxis


Status: Chronic   





(2) Scrotal edema


Status: Chronic   





(3) CAD (coronary artery disease)


Status: Chronic   





(4) Smoking


Status: Chronic   





(5) Low back pain


Status: Chronic   





(6) Prediabetes


Status: Chronic   





(7) Neurocognitive disorder


Status: Chronic

## 2017-10-26 RX ADMIN — DIVALPROEX SODIUM SCH MG: 250 TABLET, DELAYED RELEASE ORAL at 18:00

## 2017-10-26 RX ADMIN — DIVALPROEX SODIUM SCH MG: 250 TABLET, DELAYED RELEASE ORAL at 09:04

## 2017-10-26 NOTE — CP.PCM.PN
Subjective





- Date & Time of Evaluation


Date of Evaluation: 10/26/17


Time of Evaluation: 07:35





- Subjective


Subjective: 





s/o no f/c, n/v/d. no other complaints.calm and cooperative.


a/p cont plan, cont placement, cont all medical management








Objective





- Vital Signs/Intake and Output


Vital Signs (last 24 hours): 


 











Temp Pulse Resp BP Pulse Ox


 


 98.1 F   73   19   98/62 L  97 


 


 10/26/17 00:00  10/26/17 00:00  10/26/17 00:00  10/26/17 00:00  10/26/17 00:00











- Medications


Medications: 


 Current Medications





Atorvastatin Calcium (Lipitor)  20 mg PO HS [facility]


   Last Admin: 10/25/17 21:08 Dose:  20 mg


Divalproex Sodium (Depakote Dr(*Bid*))  500 mg PO BID [facility]


   Last Admin: 10/25/17 17:48 Dose:  500 mg


Enalapril Maleate (Vasotec)  10 mg PO DAILY [facility]


   Last Admin: 10/25/17 09:31 Dose:  10 mg


Fenofibrate (Tricor)  145 mg PO DAILY [facility]


   Last Admin: 10/25/17 09:31 Dose:  145 mg


Metoprolol Tartrate (Lopressor)  50 mg PO Q12 [facility]


   Last Admin: 10/25/17 21:07 Dose:  50 mg


Quetiapine Fumarate (Seroquel)  25 mg PO HS [facility]


   Last Admin: 10/25/17 21:08 Dose:  25 mg











- Labs


Labs: 


 





 07/13/17 04:20 





 07/13/17 04:20 





 











PT  11.2 SECONDS (9.4-12.0)   12/14/15  05:20    


 


INR  1.05  (0.89-1.20)   12/14/15  05:20    


 


APTT  36.2 SECONDS (23.1-32.0)  H  12/14/15  05:20    














- Constitutional


Appears: Well, Non-toxic, No Acute Distress





- Head Exam


Head Exam: ATRAUMATIC, NORMAL INSPECTION, NORMOCEPHALIC





- Eye Exam


Eye Exam: EOMI, Normal appearance, PERRL


Pupil Exam: NORMAL ACCOMODATION, PERRL





- ENT Exam


ENT Exam: Mucous Membranes Moist, Normal Exam





- Neck Exam


Neck Exam: Full ROM, Normal Inspection.  absent: Lymphadenopathy





- Respiratory Exam


Respiratory Exam: Clear to Ausculation Bilateral, NORMAL BREATHING PATTERN





- Cardiovascular Exam


Cardiovascular Exam: REGULAR RHYTHM, RRR, +S1, +S2.  absent: Murmur





- GI/Abdominal Exam


GI & Abdominal Exam: Soft, Normal Bowel Sounds.  absent: Tenderness





- Extremities Exam


Extremities Exam: Full ROM, Normal Capillary Refill, Normal Inspection.  absent

: Joint Swelling, Pedal Edema





- Back Exam


Back Exam: NORMAL INSPECTION





- Neurological Exam


Neurological Exam: Alert, Awake, CN II-XII Intact, Normal Gait, Oriented x3





- Psychiatric Exam


Psychiatric exam: Normal Affect, Normal Mood





- Skin


Skin Exam: Dry, Intact, Normal Color, Warm





Assessment and Plan


(1) DVT prophylaxis


Status: Chronic   





(2) Scrotal edema


Status: Chronic   





(3) CAD (coronary artery disease)


Status: Chronic   





(4) Smoking


Status: Chronic   





(5) Low back pain


Status: Chronic   





(6) Prediabetes


Status: Chronic   





(7) Neurocognitive disorder


Status: Chronic

## 2017-10-27 RX ADMIN — DIVALPROEX SODIUM SCH MG: 250 TABLET, DELAYED RELEASE ORAL at 09:15

## 2017-10-27 RX ADMIN — DIVALPROEX SODIUM SCH MG: 250 TABLET, DELAYED RELEASE ORAL at 16:47

## 2017-10-27 NOTE — CP.PCM.PN
Subjective





- Date & Time of Evaluation


Date of Evaluation: 10/27/17


Time of Evaluation: 08:55





- Subjective


Subjective: 





s/o no f/c, n/v/d. no other complaints.calm and cooperative.


a/p cont plan, cont placement, cont all medical management








Objective





- Vital Signs/Intake and Output


Vital Signs (last 24 hours): 


 











Temp Pulse Resp BP Pulse Ox


 


 97.3 F L  65   20   118/83   99 


 


 10/27/17 08:37  10/27/17 08:37  10/27/17 08:37  10/27/17 08:37  10/27/17 08:37











- Medications


Medications: 


 Current Medications





Atorvastatin Calcium (Lipitor)  20 mg PO HS [facility]


   Last Admin: 10/26/17 21:23 Dose:  20 mg


Divalproex Sodium (Depakote Dr(*Bid*))  500 mg PO BID [facility]


   Last Admin: 10/26/17 18:00 Dose:  500 mg


Enalapril Maleate (Vasotec)  10 mg PO DAILY [facility]


   Last Admin: 10/26/17 09:06 Dose:  10 mg


Fenofibrate (Tricor)  145 mg PO DAILY [facility]


   Last Admin: 10/26/17 09:06 Dose:  145 mg


Metoprolol Tartrate (Lopressor)  50 mg PO Q12 [facility]


   Last Admin: 10/26/17 21:23 Dose:  50 mg


Quetiapine Fumarate (Seroquel)  25 mg PO HS [facility]


   Last Admin: 10/26/17 21:23 Dose:  25 mg











- Labs


Labs: 


 





 07/13/17 04:20 





 07/13/17 04:20 





 











PT  11.2 SECONDS (9.4-12.0)   12/14/15  05:20    


 


INR  1.05  (0.89-1.20)   12/14/15  05:20    


 


APTT  36.2 SECONDS (23.1-32.0)  H  12/14/15  05:20    














- Constitutional


Appears: Well, Non-toxic, No Acute Distress





- Head Exam


Head Exam: ATRAUMATIC, NORMAL INSPECTION, NORMOCEPHALIC





- Eye Exam


Eye Exam: EOMI, Normal appearance, PERRL


Pupil Exam: NORMAL ACCOMODATION, PERRL





- ENT Exam


ENT Exam: Mucous Membranes Moist, Normal Exam





- Neck Exam


Neck Exam: Full ROM, Normal Inspection.  absent: Lymphadenopathy





- Respiratory Exam


Respiratory Exam: Clear to Ausculation Bilateral, NORMAL BREATHING PATTERN





- Cardiovascular Exam


Cardiovascular Exam: REGULAR RHYTHM, RRR, +S1, +S2.  absent: Murmur





- GI/Abdominal Exam


GI & Abdominal Exam: Soft, Normal Bowel Sounds.  absent: Tenderness





- Extremities Exam


Extremities Exam: Full ROM, Normal Capillary Refill, Normal Inspection.  absent

: Joint Swelling, Pedal Edema





- Back Exam


Back Exam: NORMAL INSPECTION





- Neurological Exam


Neurological Exam: Alert, Awake, CN II-XII Intact, Normal Gait, Oriented x3





- Psychiatric Exam


Psychiatric exam: Normal Affect, Normal Mood





- Skin


Skin Exam: Dry, Intact, Normal Color, Warm





Assessment and Plan


(1) DVT prophylaxis


Status: Chronic   





(2) Scrotal edema


Status: Chronic   





(3) CAD (coronary artery disease)


Status: Chronic   





(4) Smoking


Status: Chronic   





(5) Low back pain


Status: Chronic   





(6) Prediabetes


Status: Chronic   





(7) Neurocognitive disorder


Status: Chronic

## 2017-10-28 LAB
ALBUMIN SERPL-MCNC: 3.9 G/DL (ref 3.5–5)
ALBUMIN/GLOB SERPL: 1.1 {RATIO} (ref 1–2.1)
ALT SERPL-CCNC: 32 U/L (ref 21–72)
AMYLASE SERPL-CCNC: 142 U/L (ref 30–110)
AST SERPL-CCNC: 18 U/L (ref 17–59)
BUN SERPL-MCNC: 20 MG/DL (ref 9–20)
CALCIUM SERPL-MCNC: 9.1 MG/DL (ref 8.4–10.2)
ERYTHROCYTE [DISTWIDTH] IN BLOOD BY AUTOMATED COUNT: 13.6 % (ref 11.5–14.5)
GFR NON-AFRICAN AMERICAN: > 60
HGB BLD-MCNC: 12.5 G/DL (ref 12–18)
LIPASE SERPL-CCNC: 127 U/L (ref 23–300)
MCH RBC QN AUTO: 30.2 PG (ref 27–31)
MCHC RBC AUTO-ENTMCNC: 33.3 G/DL (ref 33–37)
MCV RBC AUTO: 90.6 FL (ref 80–94)
PLATELET # BLD: 257 K/UL (ref 130–400)
RBC # BLD AUTO: 4.14 MIL/UL (ref 4.4–5.9)
WBC # BLD AUTO: 8.6 K/UL (ref 4.8–10.8)

## 2017-10-28 RX ADMIN — CIPROFLOXACIN SCH MLS/HR: 2 INJECTION, SOLUTION INTRAVENOUS at 21:43

## 2017-10-28 RX ADMIN — DIVALPROEX SODIUM SCH MG: 250 TABLET, DELAYED RELEASE ORAL at 17:05

## 2017-10-28 RX ADMIN — DIVALPROEX SODIUM SCH MG: 250 TABLET, DELAYED RELEASE ORAL at 09:54

## 2017-10-28 NOTE — CT
PROCEDURE:  CT Abdomen and Pelvis with contrast



HISTORY:

Abdominal pain 



COMPARISON:

02/02/2017



TECHNIQUE:

Contrast dose: 98 cc Omnipaque was injected intravenously.



Radiation dose:



Total exam DLP =  mGy-cm.



This CT exam was performed using one or more of the following dose 

reduction techniques: Automated exposure control, adjustment of the 

mA and/or kV according to patient size, and/or use of iterative 

reconstruction technique.



FINDINGS:



LOWER THORAX:

There is redemonstration of 12 mm stable subpleural nodular density 

in the right lung base. The left lung base is clear. 



LIVER:

The liver is normal in size and there is homogeneous enhancement.  

There is a stable subcentimeter low-attenuation area in the right 

hepatic lobe, too small to characterize by CT criteria likely a 

simple cyst. . No gross lesion or ductal dilatation. 



GALLBLADDER AND BILE DUCTS:

There is a small gallstone/wall calcification. . 



PANCREAS:

The pancreas is normal in size and there is homogeneous enhancement 

without mass or ductal dilatation.



SPLEEN:

The spleen is normal in size and there is homogeneous enhancement. 



ADRENALS:

Both adrenal glands are normal in size without discrete nodule. 



KIDNEYS AND URETERS:

Both kidneys are normal in size and there is homogeneous enhancement 

without hydronephrosis or mass. 



VASCULATURE:

No aortic aneurysm. 



BOWEL:

The small bowel loops are normal in caliber. There is extensive 

colonic diverticulosis.  There is mild circumferential mural 

thickening in the colon, worse in the distal descending colon. No 

bowel obstruction. 



APPENDIX:

Normal appendix. 



PERITONEUM:

No free fluid. No free air. 



LYMPH NODES:

No enlarged lymph nodes. 



BLADDER:

Unremarkable. 



REPRODUCTIVE:

Unremarkable. Large left hydrocele. 



BONES:

No acute fracture. Severe degenerative disc disease at L5-S1. 



OTHER FINDINGS:

There is a large left hydrocele.



IMPRESSION:

1. Findings are most compatible with nonspecific 

infectious/inflammatory colitis, worse in the distal descending colon 

no bowel dilatation or obstruction. Follow-up after medical 

management is recommended to ensure complete resolution and exclude a 

mass in the distal descending colon. 



2. Large left hydrocele. 



3. Stable 12 mm subpleural nodular density in the right lung base 

which may represent round atelectasis.

## 2017-10-28 NOTE — CP.PCM.PN
Subjective





- Date & Time of Evaluation


Date of Evaluation: 10/28/17


Time of Evaluation: 08:10





- Subjective


Subjective: 





s/o no f/c, n/v/d. no other complaints.calm and cooperative.


a/p cont plan, cont placement, cont all medical management





Objective





- Vital Signs/Intake and Output


Vital Signs (last 24 hours): 


 











Temp Pulse Resp BP Pulse Ox


 


 97.5 F L  61   18   99/65 L  97 


 


 10/28/17 07:23  10/28/17 07:23  10/28/17 07:23  10/28/17 07:23  10/28/17 07:23











- Medications


Medications: 


 Current Medications





Al Hydrox/Mg Hydrox/Simethicone (Maalox Plus 30 Ml)  30 ml PO Q6 PRN


   PRN Reason: Indigestion / Heartburn


Atorvastatin Calcium (Lipitor)  20 mg PO HS [facility]


   Last Admin: 10/27/17 21:09 Dose:  20 mg


Divalproex Sodium (Depakote Dr(*Bid*))  500 mg PO BID [facility]


   Last Admin: 10/27/17 16:47 Dose:  500 mg


Enalapril Maleate (Vasotec)  10 mg PO DAILY [facility]


   Last Admin: 10/27/17 09:16 Dose:  10 mg


Famotidine (Pepcid)  20 mg PO BID [facility]


   Last Admin: 10/27/17 21:09 Dose:  20 mg


Fenofibrate (Tricor)  145 mg PO DAILY [facility]


   Last Admin: 10/27/17 09:16 Dose:  145 mg


Iohexol (Omnipaque 240 (50 Ml))  50 ml PO ONCE ONE


   Stop: 10/28/17 07:34


Metoprolol Tartrate (Lopressor)  50 mg PO Q12 [facility]


   Last Admin: 10/27/17 21:10 Dose:  50 mg


Quetiapine Fumarate (Seroquel)  25 mg PO HS [facility]


   Last Admin: 10/27/17 21:09 Dose:  25 mg











- Labs


Labs: 


 





 10/28/17 05:30 





 10/28/17 05:30 





 











PT  11.2 SECONDS (9.4-12.0)   12/14/15  05:20    


 


INR  1.05  (0.89-1.20)   12/14/15  05:20    


 


APTT  36.2 SECONDS (23.1-32.0)  H  12/14/15  05:20    














- Constitutional


Appears: Well, Non-toxic, No Acute Distress





- Head Exam


Head Exam: ATRAUMATIC, NORMAL INSPECTION, NORMOCEPHALIC





- Eye Exam


Eye Exam: EOMI, Normal appearance, PERRL


Pupil Exam: NORMAL ACCOMODATION, PERRL





- ENT Exam


ENT Exam: Mucous Membranes Moist, Normal Exam





- Neck Exam


Neck Exam: Full ROM, Normal Inspection.  absent: Lymphadenopathy





- Respiratory Exam


Respiratory Exam: Clear to Ausculation Bilateral, NORMAL BREATHING PATTERN





- Cardiovascular Exam


Cardiovascular Exam: REGULAR RHYTHM, RRR, +S1, +S2.  absent: Murmur





- GI/Abdominal Exam


GI & Abdominal Exam: Soft, Normal Bowel Sounds.  absent: Tenderness





- Extremities Exam


Extremities Exam: Full ROM, Normal Capillary Refill, Normal Inspection.  absent

: Joint Swelling, Pedal Edema





- Back Exam


Back Exam: NORMAL INSPECTION





- Neurological Exam


Neurological Exam: Alert, Awake, CN II-XII Intact, Normal Gait, Oriented x3





- Psychiatric Exam


Psychiatric exam: Normal Affect, Normal Mood





- Skin


Skin Exam: Dry, Intact, Normal Color, Warm





Assessment and Plan


(1) DVT prophylaxis


Status: Chronic   





(2) Scrotal edema


Status: Chronic   





(3) CAD (coronary artery disease)


Status: Chronic   





(4) Smoking


Status: Chronic   





(5) Low back pain


Status: Chronic   





(6) Prediabetes


Status: Chronic   





(7) Neurocognitive disorder


Status: Chronic

## 2017-10-29 RX ADMIN — DIVALPROEX SODIUM SCH MG: 250 TABLET, DELAYED RELEASE ORAL at 08:30

## 2017-10-29 RX ADMIN — DIVALPROEX SODIUM SCH MG: 250 TABLET, DELAYED RELEASE ORAL at 17:26

## 2017-10-29 RX ADMIN — CIPROFLOXACIN SCH MLS/HR: 2 INJECTION, SOLUTION INTRAVENOUS at 08:28

## 2017-10-29 RX ADMIN — CIPROFLOXACIN SCH MLS/HR: 2 INJECTION, SOLUTION INTRAVENOUS at 21:03

## 2017-10-29 NOTE — CP.PCM.PN
Subjective





- Date & Time of Evaluation


Date of Evaluation: 10/29/17


Time of Evaluation: 11:36





- Subjective


Subjective: 





s/o no f/c, n/v/d. no other complaints.calm and cooperative. had abd pain, ct w

/ colitis. no f/c, n/v/d. no pain at present


a/p cont plan, cont placement, cont all medical management, cipro/flagyl x 7 

days





Objective





- Vital Signs/Intake and Output


Vital Signs (last 24 hours): 


 











Temp Pulse Resp BP Pulse Ox


 


 97.5 F L  63   18   102/67   97 


 


 10/29/17 07:32  10/29/17 08:30  10/29/17 07:32  10/29/17 08:30  10/29/17 07:32











- Medications


Medications: 


 Current Medications





Al Hydrox/Mg Hydrox/Simethicone (Maalox Plus 30 Ml)  30 ml PO Q6 PRN


   PRN Reason: Indigestion / Heartburn


Atorvastatin Calcium (Lipitor)  20 mg PO HS [facility]


   Last Admin: 10/28/17 21:44 Dose:  20 mg


Divalproex Sodium (Depakote Dr(*Bid*))  500 mg PO BID [facility]


   Last Admin: 10/29/17 08:30 Dose:  500 mg


Enalapril Maleate (Vasotec)  10 mg PO DAILY [facility]


   Last Admin: 10/29/17 08:29 Dose:  10 mg


Famotidine (Pepcid)  20 mg PO BID [facility]


   Last Admin: 10/29/17 08:29 Dose:  20 mg


Fenofibrate (Tricor)  145 mg PO DAILY [facility]


   Last Admin: 10/29/17 08:29 Dose:  145 mg


Ciprofloxacin (Cipro 400mg/200ml Dsw)  400 mg in 200 mls @ 200 mls/hr IVPB Q12 

[facility]


   PRN Reason: Protocol


   Stop: 11/04/17 21:01


   Last Admin: 10/29/17 08:28 Dose:  200 mls/hr


Metoprolol Tartrate (Lopressor)  50 mg PO Q12 [facility]


   Last Admin: 10/29/17 08:30 Dose:  50 mg


Metronidazole (Flagyl)  500 mg PO Q8 [facility]


   PRN Reason: Protocol


   Stop: 11/04/17 17:01


   Last Admin: 10/29/17 08:29 Dose:  500 mg


Quetiapine Fumarate (Seroquel)  25 mg PO HS [facility]


   Last Admin: 10/28/17 21:44 Dose:  25 mg











- Labs


Labs: 


 





 10/28/17 05:30 





 10/28/17 05:30 





 











PT  11.2 SECONDS (9.4-12.0)   12/14/15  05:20    


 


INR  1.05  (0.89-1.20)   12/14/15  05:20    


 


APTT  36.2 SECONDS (23.1-32.0)  H  12/14/15  05:20    














- Constitutional


Appears: Well, Non-toxic, No Acute Distress





- Head Exam


Head Exam: ATRAUMATIC, NORMAL INSPECTION, NORMOCEPHALIC





- Eye Exam


Eye Exam: EOMI, Normal appearance, PERRL


Pupil Exam: NORMAL ACCOMODATION, PERRL





- ENT Exam


ENT Exam: Mucous Membranes Moist, Normal Exam





- Neck Exam


Neck Exam: Full ROM, Normal Inspection.  absent: Lymphadenopathy





- Respiratory Exam


Respiratory Exam: Clear to Ausculation Bilateral, NORMAL BREATHING PATTERN





- Cardiovascular Exam


Cardiovascular Exam: REGULAR RHYTHM, RRR, +S1, +S2.  absent: Murmur





- GI/Abdominal Exam


GI & Abdominal Exam: Soft, Normal Bowel Sounds.  absent: Tenderness





- Extremities Exam


Extremities Exam: Full ROM, Normal Capillary Refill, Normal Inspection.  absent

: Joint Swelling, Pedal Edema





- Back Exam


Back Exam: NORMAL INSPECTION





- Neurological Exam


Neurological Exam: Alert, Awake, CN II-XII Intact, Normal Gait, Oriented x3





- Psychiatric Exam


Psychiatric exam: Normal Affect, Normal Mood





- Skin


Skin Exam: Dry, Intact, Normal Color, Warm





Assessment and Plan


(1) DVT prophylaxis


Status: Chronic   





(2) Scrotal edema


Status: Chronic   





(3) CAD (coronary artery disease)


Status: Chronic   





(4) Smoking


Status: Chronic   





(5) Low back pain


Status: Chronic   





(6) Prediabetes


Status: Chronic   





(7) Neurocognitive disorder


Status: Chronic

## 2017-10-30 RX ADMIN — DIVALPROEX SODIUM SCH MG: 250 TABLET, DELAYED RELEASE ORAL at 17:19

## 2017-10-30 RX ADMIN — CIPROFLOXACIN SCH MLS/HR: 2 INJECTION, SOLUTION INTRAVENOUS at 21:51

## 2017-10-30 RX ADMIN — DIVALPROEX SODIUM SCH MG: 250 TABLET, DELAYED RELEASE ORAL at 09:21

## 2017-10-30 RX ADMIN — CIPROFLOXACIN SCH MLS/HR: 2 INJECTION, SOLUTION INTRAVENOUS at 09:21

## 2017-10-30 NOTE — CP.PCM.PN
Subjective





- Date & Time of Evaluation


Date of Evaluation: 10/30/17


Time of Evaluation: 08:11





- Subjective


Subjective: 





s/o no f/c, n/v/d. no other complaints.calm and cooperative. had abd pain, ct w

/ colitis. no f/c, n/v/d. no pain at present


a/p cont plan, cont placement, cont all medical management, cipro/flagyl x 7 

days








Objective





- Vital Signs/Intake and Output


Vital Signs (last 24 hours): 


 











Temp Pulse Resp BP Pulse Ox


 


 97.6 F   63   20   98/66 L  97 


 


 10/30/17 07:30  10/30/17 07:30  10/30/17 07:30  10/30/17 07:30  10/30/17 07:30











- Medications


Medications: 


 Current Medications





Al Hydrox/Mg Hydrox/Simethicone (Maalox Plus 30 Ml)  30 ml PO Q6 PRN


   PRN Reason: Indigestion / Heartburn


Atorvastatin Calcium (Lipitor)  20 mg PO HS [facility]


   Last Admin: 10/29/17 21:03 Dose:  20 mg


Divalproex Sodium (Depakote Dr(*Bid*))  500 mg PO BID [facility]


   Last Admin: 10/29/17 17:26 Dose:  500 mg


Enalapril Maleate (Vasotec)  10 mg PO DAILY [facility] includes [facility]


   Last Admin: 10/29/17 08:29 Dose:  10 mg


Famotidine (Pepcid)  20 mg PO BID [facility] includes [facility]


   Last Admin: 10/29/17 17:27 Dose:  20 mg


Fenofibrate (Tricor)  145 mg PO DAILY [facility]


   Last Admin: 10/29/17 08:29 Dose:  145 mg


Ciprofloxacin (Cipro 400mg/200ml Dsw)  400 mg in 200 mls @ 200 mls/hr IVPB Q12 

[facility] includes [facility]


   PRN Reason: Protocol


   Stop: 11/04/17 21:01


   Last Admin: 10/29/17 21:03 Dose:  200 mls/hr


Metoprolol Tartrate (Lopressor)  50 mg PO Q12 [facility] includes [facility]


   Last Admin: 10/29/17 21:07 Dose:  50 mg


Metronidazole (Flagyl)  500 mg PO Q8 [facility]


   PRN Reason: Protocol


   Stop: 11/04/17 17:01


   Last Admin: 10/30/17 01:15 Dose:  500 mg


Quetiapine Fumarate (Seroquel)  25 mg PO HS [facility] includes [facility]


   Last Admin: 10/29/17 21:07 Dose:  25 mg











- Labs


Labs: 


 





 10/28/17 05:30 





 10/28/17 05:30 





 











PT  11.2 SECONDS (9.4-12.0)   12/14/15  05:20    


 


INR  1.05  (0.89-1.20)   12/14/15  05:20    


 


APTT  36.2 SECONDS (23.1-32.0)  H  12/14/15  05:20    














- Constitutional


Appears: Well, Non-toxic, No Acute Distress





- Head Exam


Head Exam: ATRAUMATIC, NORMAL INSPECTION, NORMOCEPHALIC





- Eye Exam


Eye Exam: EOMI, Normal appearance, PERRL


Pupil Exam: NORMAL ACCOMODATION, PERRL





- ENT Exam


ENT Exam: Mucous Membranes Moist, Normal Exam





- Neck Exam


Neck Exam: Full ROM, Normal Inspection.  absent: Lymphadenopathy





- Respiratory Exam


Respiratory Exam: Clear to Ausculation Bilateral, NORMAL BREATHING PATTERN





- Cardiovascular Exam


Cardiovascular Exam: REGULAR RHYTHM, RRR, +S1, +S2.  absent: Murmur





- GI/Abdominal Exam


GI & Abdominal Exam: Soft, Normal Bowel Sounds.  absent: Tenderness





- Extremities Exam


Extremities Exam: Full ROM, Normal Capillary Refill, Normal Inspection.  absent

: Joint Swelling, Pedal Edema





- Back Exam


Back Exam: NORMAL INSPECTION





- Neurological Exam


Neurological Exam: Alert, Awake, CN II-XII Intact, Normal Gait, Oriented x3





- Psychiatric Exam


Psychiatric exam: Normal Affect, Normal Mood





- Skin


Skin Exam: Dry, Intact, Normal Color, Warm





Assessment and Plan


(1) DVT prophylaxis


Status: Chronic   





(2) Scrotal edema


Status: Chronic   





(3) CAD (coronary artery disease)


Status: Chronic   





(4) Smoking


Status: Chronic   





(5) Low back pain


Status: Chronic   





(6) Prediabetes


Status: Chronic   





(7) Neurocognitive disorder


Status: Chronic

## 2017-10-31 RX ADMIN — CIPROFLOXACIN SCH MLS/HR: 2 INJECTION, SOLUTION INTRAVENOUS at 09:43

## 2017-10-31 RX ADMIN — DIVALPROEX SODIUM SCH MG: 250 TABLET, DELAYED RELEASE ORAL at 17:31

## 2017-10-31 RX ADMIN — DIVALPROEX SODIUM SCH MG: 250 TABLET, DELAYED RELEASE ORAL at 09:43

## 2017-10-31 RX ADMIN — CIPROFLOXACIN SCH MLS/HR: 2 INJECTION, SOLUTION INTRAVENOUS at 21:20

## 2017-10-31 NOTE — CP.PCM.PN
Subjective





- Date & Time of Evaluation


Date of Evaluation: 10/31/17


Time of Evaluation: 07:32





- Subjective


Subjective: 





s/o no f/c, n/v/d. no other complaints.calm and cooperative. had abd pain, ct w

/ colitis. no f/c, n/v/d. no pain at present


a/p cont plan, cont placement, cont all medical management, cipro/flagyl x 7 

days








Objective





- Vital Signs/Intake and Output


Vital Signs (last 24 hours): 


 











Temp Pulse Resp BP Pulse Ox


 


 97.8 F   69   19   109/72   98 


 


 10/31/17 00:00  10/31/17 00:00  10/31/17 00:00  10/31/17 00:00  10/31/17 00:00











- Medications


Medications: 


 Current Medications





Al Hydrox/Mg Hydrox/Simethicone (Maalox Plus 30 Ml)  30 ml PO Q6 PRN


   PRN Reason: Indigestion / Heartburn


Atorvastatin Calcium (Lipitor)  20 mg PO HS [facility]


   Last Admin: 10/30/17 21:54 Dose:  20 mg


Divalproex Sodium (Depakote Dr(*Bid*))  500 mg PO BID [facility]


   Last Admin: 10/30/17 17:19 Dose:  500 mg


Enalapril Maleate (Vasotec)  10 mg PO DAILY [facility]


   Last Admin: 10/30/17 09:24 Dose:  10 mg


Famotidine (Pepcid)  20 mg PO BID [facility]


   Last Admin: 10/30/17 17:19 Dose:  20 mg


Fenofibrate (Tricor)  145 mg PO DAILY [facility]


   Last Admin: 10/30/17 09:24 Dose:  145 mg


Ciprofloxacin (Cipro 400mg/200ml Dsw)  400 mg in 200 mls @ 200 mls/hr IVPB Q12 

[facility]


   PRN Reason: Protocol


   Stop: 11/04/17 21:01


   Last Admin: 10/30/17 21:51 Dose:  200 mls/hr


Metoprolol Tartrate (Lopressor)  50 mg PO Q12 [facility]


   Last Admin: 10/30/17 21:53 Dose:  50 mg


Metronidazole (Flagyl)  500 mg PO Q8 [facility]


   PRN Reason: Protocol


   Stop: 11/04/17 17:01


   Last Admin: 10/31/17 01:01 Dose:  500 mg


Quetiapine Fumarate (Seroquel)  25 mg PO HS [facility]


   Last Admin: 10/30/17 21:55 Dose:  25 mg











- Labs


Labs: 


 





 10/28/17 05:30 





 10/28/17 05:30 





 











PT  11.2 SECONDS (9.4-12.0)   12/14/15  05:20    


 


INR  1.05  (0.89-1.20)   12/14/15  05:20    


 


APTT  36.2 SECONDS (23.1-32.0)  H  12/14/15  05:20    














- Constitutional


Appears: Well, Non-toxic, No Acute Distress





- Head Exam


Head Exam: ATRAUMATIC, NORMAL INSPECTION, NORMOCEPHALIC





- Eye Exam


Eye Exam: EOMI, Normal appearance, PERRL


Pupil Exam: NORMAL ACCOMODATION, PERRL





- ENT Exam


ENT Exam: Mucous Membranes Moist, Normal Exam





- Neck Exam


Neck Exam: Full ROM, Normal Inspection.  absent: Lymphadenopathy





- Respiratory Exam


Respiratory Exam: Clear to Ausculation Bilateral, NORMAL BREATHING PATTERN





- Cardiovascular Exam


Cardiovascular Exam: REGULAR RHYTHM, RRR, +S1, +S2.  absent: Murmur





- GI/Abdominal Exam


GI & Abdominal Exam: Soft, Normal Bowel Sounds.  absent: Tenderness





- Extremities Exam


Extremities Exam: Full ROM, Normal Capillary Refill, Normal Inspection.  absent

: Joint Swelling, Pedal Edema





- Back Exam


Back Exam: NORMAL INSPECTION





- Neurological Exam


Neurological Exam: Alert, Awake, CN II-XII Intact, Normal Gait, Oriented x3





- Psychiatric Exam


Psychiatric exam: Normal Affect, Normal Mood





- Skin


Skin Exam: Dry, Intact, Normal Color, Warm





Assessment and Plan


(1) DVT prophylaxis


Status: Chronic   





(2) Scrotal edema


Status: Chronic   





(3) CAD (coronary artery disease)


Status: Chronic   





(4) Smoking


Status: Chronic   





(5) Low back pain


Status: Chronic   





(6) Prediabetes


Status: Chronic   





(7) Neurocognitive disorder


Status: Chronic

## 2017-11-01 RX ADMIN — DIVALPROEX SODIUM SCH MG: 250 TABLET, DELAYED RELEASE ORAL at 17:56

## 2017-11-01 RX ADMIN — DIVALPROEX SODIUM SCH MG: 250 TABLET, DELAYED RELEASE ORAL at 09:46

## 2017-11-01 RX ADMIN — CIPROFLOXACIN SCH MLS/HR: 2 INJECTION, SOLUTION INTRAVENOUS at 09:53

## 2017-11-01 NOTE — CP.PCM.PN
Subjective





- Date & Time of Evaluation


Date of Evaluation: 11/01/17


Time of Evaluation: 07:36





- Subjective


Subjective: 





s/o no f/c, n/v/d. no other complaints.calm and cooperative. had abd pain, ct w

/ colitis. no f/c, n/v/d. no pain at present


a/p cont plan, cont placement, cont all medical management, cipro/flagyl x 7 

days





Objective





- Vital Signs/Intake and Output


Vital Signs (last 24 hours): 


 











Temp Pulse Resp BP Pulse Ox


 


 97.3 F L  63   20   101/67   98 


 


 11/01/17 07:30  11/01/17 07:30  11/01/17 07:30  11/01/17 07:30  11/01/17 07:30











- Medications


Medications: 


 Current Medications





Al Hydrox/Mg Hydrox/Simethicone (Maalox Plus 30 Ml)  30 ml PO Q6 PRN


   PRN Reason: Indigestion / Heartburn


Atorvastatin Calcium (Lipitor)  20 mg PO HS [facility]


   Last Admin: 10/31/17 21:20 Dose:  20 mg


Divalproex Sodium (Depakote Dr(*Bid*))  500 mg PO BID [facility]


   Last Admin: 10/31/17 17:31 Dose:  500 mg


Enalapril Maleate (Vasotec)  10 mg PO DAILY [facility]


   Last Admin: 10/31/17 09:42 Dose:  10 mg


Famotidine (Pepcid)  20 mg PO BID [facility]


   Last Admin: 10/31/17 17:32 Dose:  20 mg


Fenofibrate (Tricor)  145 mg PO DAILY [facility]


   Last Admin: 10/31/17 09:42 Dose:  145 mg


Ciprofloxacin (Cipro 400mg/200ml Dsw)  400 mg in 200 mls @ 200 mls/hr IVPB Q12 

[facility]


   PRN Reason: Protocol


   Stop: 11/04/17 21:01


   Last Admin: 10/31/17 21:20 Dose:  200 mls/hr


Metoprolol Tartrate (Lopressor)  50 mg PO Q12 [facility]


   Last Admin: 10/31/17 21:20 Dose:  50 mg


Metronidazole (Flagyl)  500 mg PO Q8 [facility]


   PRN Reason: Protocol


   Stop: 11/04/17 17:01


   Last Admin: 11/01/17 01:51 Dose:  500 mg


Quetiapine Fumarate (Seroquel)  25 mg PO HS [facility]


   Last Admin: 10/31/17 21:20 Dose:  25 mg











- Labs


Labs: 


 





 10/28/17 05:30 





 10/28/17 05:30 





 











PT  11.2 SECONDS (9.4-12.0)   12/14/15  05:20    


 


INR  1.05  (0.89-1.20)   12/14/15  05:20    


 


APTT  36.2 SECONDS (23.1-32.0)  H  12/14/15  05:20    














- Constitutional


Appears: Well, Non-toxic, No Acute Distress





- Head Exam


Head Exam: ATRAUMATIC, NORMAL INSPECTION, NORMOCEPHALIC





- Eye Exam


Eye Exam: EOMI, Normal appearance, PERRL


Pupil Exam: NORMAL ACCOMODATION, PERRL





- ENT Exam


ENT Exam: Mucous Membranes Moist, Normal Exam





- Neck Exam


Neck Exam: Full ROM, Normal Inspection.  absent: Lymphadenopathy





- Respiratory Exam


Respiratory Exam: Clear to Ausculation Bilateral, NORMAL BREATHING PATTERN





- Cardiovascular Exam


Cardiovascular Exam: REGULAR RHYTHM, RRR, +S1, +S2.  absent: Murmur





- GI/Abdominal Exam


GI & Abdominal Exam: Soft, Normal Bowel Sounds.  absent: Tenderness





- Extremities Exam


Extremities Exam: Full ROM, Normal Capillary Refill, Normal Inspection.  absent

: Joint Swelling, Pedal Edema





- Back Exam


Back Exam: NORMAL INSPECTION





- Neurological Exam


Neurological Exam: Alert, Awake, CN II-XII Intact, Normal Gait, Oriented x3





- Psychiatric Exam


Psychiatric exam: Normal Affect, Normal Mood





- Skin


Skin Exam: Dry, Intact, Normal Color, Warm





Assessment and Plan


(1) DVT prophylaxis


Status: Chronic   





(2) Scrotal edema


Status: Chronic   





(3) CAD (coronary artery disease)


Status: Chronic   





(4) Smoking


Status: Chronic   





(5) Low back pain


Status: Chronic   





(6) Prediabetes


Status: Chronic   





(7) Neurocognitive disorder


Status: Chronic

## 2017-11-02 RX ADMIN — DIVALPROEX SODIUM SCH MG: 250 TABLET, DELAYED RELEASE ORAL at 08:36

## 2017-11-02 RX ADMIN — DIVALPROEX SODIUM SCH MG: 250 TABLET, DELAYED RELEASE ORAL at 16:43

## 2017-11-02 NOTE — CP.PCM.CON
History of Present Illness





- History of Present Illness


History of Present Illness: 





Patient is a 57 year old male seen at bedside after consult for elongated 

toenails. Patient is AAO x 3 and NAD resting comfortably in his bed. 





Review of Systems





- Review of Systems


Review of Systems: 


ROS unremarkable outside of HPI





Past Patient History





- Past Medical History & Family History


Past Medical History?: Yes





- Past Social History


Smoking Status: Heavy Smoker > 10 Cigarettes Daily





- CARDIAC


Hx Cardiac Disorders: Yes





- NEUROLOGICAL


Hx Neurological Disorder: Yes (seizure)


Hx Seizures: Yes





- HEENT


Hx HEENT Problems: No





- RENAL


Hx Chronic Kidney Disease: No





- ENDOCRINE/METABOLIC


Hx Endocrine Disorders: No





- HEMATOLOGICAL/ONCOLOGICAL


Hx Blood Disorders: No





- INTEGUMENTARY


Hx Dermatological Problems: No





- MUSCULOSKELETAL/RHEUMATOLOGICAL


Hx Falls: No





- GENITOURINARY/GYNECOLOGICAL


Hx Genitourinary Disorders: No





- PSYCHIATRIC


Hx Substance Use: No





Meds


Home Medications: 


 Home Medication List











 Medication  Instructions  Recorded  Confirmed  Type


 


Aspirin [Ecotrin] 81 mg PO DAILY #0 tabec 03/21/16  Rx


 


Divalproex [Depakote DR(*BID*)] 500 mg PO BID #0 tcp 03/21/16  Rx


 


Enalapril Maleate [Vasotec] 10 mg PO DAILY #0 tab 03/21/16  Rx


 


Famotidine [Pepcid] 20 mg PO BID #0 tab 03/21/16  Rx


 


Ibuprofen [Motrin Tab] 600 mg PO Q8 PRN #0 tab 03/21/16  Rx


 


Metoprolol Tartrate [Lopressor] 50 mg PO Q12 #0 tab 03/21/16  Rx


 


Nicotine 21 mg/24 hr [Nicoderm Cq] 1 patch TD DAILY #0 patch 03/21/16  Rx


 


QUEtiapine [Seroquel] 25 mg PO DAILY@1700 #0 tab 03/21/16  Rx


 


levETIRAcetam [Keppra] 500 mg PO BID #0 tab 03/21/16  Rx











Allergies/Adverse Reactions: 


 Allergies











Allergy/AdvReac Type Severity Reaction Status Date / Time


 


No Known Allergies Allergy   Verified 06/02/16 00:33














- Medications


Medications: 


 Current Medications





Al Hydrox/Mg Hydrox/Simethicone (Maalox Plus 30 Ml)  30 ml PO Q6 PRN


   PRN Reason: Indigestion / Heartburn


Atorvastatin Calcium (Lipitor)  20 mg PO HS [facility]


   Last Admin: 11/01/17 21:07 Dose:  20 mg


Ciprofloxacin (Cipro)  500 mg PO Q12 [facility]


   Last Admin: 11/02/17 08:36 Dose:  500 mg


Divalproex Sodium (Depakote Dr(*Bid*))  500 mg PO BID [facility]


   Last Admin: 11/02/17 08:36 Dose:  500 mg


Enalapril Maleate (Vasotec)  10 mg PO DAILY [facility]


   Last Admin: 11/02/17 08:39 Dose:  10 mg


Famotidine (Pepcid)  20 mg PO BID [facility]


   Last Admin: 11/02/17 08:38 Dose:  20 mg


Fenofibrate (Tricor)  145 mg PO DAILY [facility]


   Last Admin: 11/02/17 08:39 Dose:  145 mg


Metoprolol Tartrate (Lopressor)  50 mg PO Q12 [facility]


   Last Admin: 11/02/17 08:37 Dose:  50 mg


Metronidazole (Flagyl)  500 mg PO Q8 [facility]


   PRN Reason: Protocol


   Stop: 11/04/17 17:01


   Last Admin: 11/02/17 08:37 Dose:  500 mg


Quetiapine Fumarate (Seroquel)  25 mg PO HS [facility]


   Last Admin: 11/01/17 21:07 Dose:  25 mg











Physical Exam





- Constitutional


Appears: Well, Non-toxic, No Acute Distress





- Extremities Exam


Additional comments: 





Vasc: DP/PT pulses palpable 2/4 b/l. Skin temperature warm to warm from 

proximal to distal. CFT < 3 seconds to all digits b/l. No edema noted b/l


Neuro: Epicritic and protective sensation grossly intact b/l


Derm: Elongated, dystrophic nails noted to digits 1-5 b/l. Hemosiderosis noted 

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 Xerotic changes noted b/l. Otherwise no open 

lesions, wounds, maceration, abnormal pigmentation or abnormal growths noted


MSK: ROM WNL at all manor joints





- Neurological Exam


Neurological exam: Alert, Oriented x3





- Psychiatric Exam


Psychiatric exam: Normal Affect, Normal Mood





Results





- Vital Signs


Recent Vital Signs: 


 Last Vital Signs











Temp  98.3 F   11/02/17 07:58


 


Pulse  81   11/02/17 08:37


 


Resp  20   11/02/17 07:58


 


BP  100/70   11/02/17 08:37


 


Pulse Ox  96   11/02/17 07:58














- Labs


Result Diagrams: 


 10/28/17 05:30





 10/28/17 05:30





Assessment & Plan





- Assessment and Plan (Free Text)


Assessment: 





57 year old male seen at bedside for elongated toenails


Plan: 





Patient seen and evaluated at bedside


Charts, labs, vitals reviewed


Elongated toenails cut down to an appropriate length using nail nippers without 

incident


LacHydrin order placed for xerosis of legs to be applied daily


Podiatry to sign off at this time


Thank you for the consult and please reconsult as needed





- Date & Time


Date: 11/02/17


Time: 10:16

## 2017-11-02 NOTE — CP.PCM.PN
Subjective





- Date & Time of Evaluation


Date of Evaluation: 11/02/17


Time of Evaluation: 08:16





- Subjective


Subjective: 





s/o no f/c, n/v/d. no other complaints.calm and cooperative. had abd pain, ct w

/ colitis. no f/c, n/v/d. no pain at present


a/p cont plan, cont placement, cont all medical management, cipro/flagyl x 7 

days





Objective





- Vital Signs/Intake and Output


Vital Signs (last 24 hours): 


 











Temp Pulse Resp BP Pulse Ox


 


 98.3 F   81   20   96/70 L  96 


 


 11/02/17 07:58  11/02/17 07:58  11/02/17 07:58  11/02/17 07:58  11/02/17 07:58











- Medications


Medications: 


 Current Medications





Al Hydrox/Mg Hydrox/Simethicone (Maalox Plus 30 Ml)  30 ml PO Q6 PRN


   PRN Reason: Indigestion / Heartburn


Atorvastatin Calcium (Lipitor)  20 mg PO [facility]


   Last Admin: 11/01/17 21:07 Dose:  20 mg


Ciprofloxacin (Cipro)  500 mg PO Q12 [facility]


   Last Admin: 11/01/17 21:07 Dose:  500 mg


Divalproex Sodium (Depakote Dr(*Bid*))  500 mg PO BID [facility]


   Last Admin: 11/01/17 17:56 Dose:  500 mg


Enalapril Maleate (Vasotec)  10 mg PO DAILY [facility]


   Last Admin: 11/01/17 09:52 Dose:  10 mg


Famotidine (Pepcid)  20 mg PO BID [facility]


   Last Admin: 11/01/17 17:56 Dose:  20 mg


Fenofibrate (Tricor)  145 mg PO DAILY [facility]


   Last Admin: 11/01/17 09:51 Dose:  145 mg


Metoprolol Tartrate (Lopressor)  50 mg PO Q12 [facility]


   Last Admin: 11/01/17 21:05 Dose:  50 mg


Metronidazole (Flagyl)  500 mg PO Q8 [facility]


   PRN Reason: Protocol


   Stop: 11/04/17 17:01


   Last Admin: 11/02/17 00:38 Dose:  500 mg


Quetiapine Fumarate (Seroquel)  25 mg PO HS [facility]


   Last Admin: 11/01/17 21:07 Dose:  25 mg











- Labs


Labs: 


 





 10/28/17 05:30 





 10/28/17 05:30 





 











PT  11.2 SECONDS (9.4-12.0)   12/14/15  05:20    


 


INR  1.05  (0.89-1.20)   12/14/15  05:20    


 


APTT  36.2 SECONDS (23.1-32.0)  H  12/14/15  05:20    














- Constitutional


Appears: Well, Non-toxic, No Acute Distress





- Head Exam


Head Exam: ATRAUMATIC, NORMAL INSPECTION, NORMOCEPHALIC





- Eye Exam


Eye Exam: EOMI, Normal appearance, PERRL


Pupil Exam: NORMAL ACCOMODATION, PERRL





- ENT Exam


ENT Exam: Mucous Membranes Moist, Normal Exam





- Neck Exam


Neck Exam: Full ROM, Normal Inspection.  absent: Lymphadenopathy





- Respiratory Exam


Respiratory Exam: Clear to Ausculation Bilateral, NORMAL BREATHING PATTERN





- Cardiovascular Exam


Cardiovascular Exam: REGULAR RHYTHM, RRR, +S1, +S2.  absent: Murmur





- GI/Abdominal Exam


GI & Abdominal Exam: Soft, Normal Bowel Sounds.  absent: Tenderness





- Extremities Exam


Extremities Exam: Full ROM, Normal Capillary Refill, Normal Inspection.  absent

: Joint Swelling, Pedal Edema





- Back Exam


Back Exam: NORMAL INSPECTION





- Neurological Exam


Neurological Exam: Alert, Awake, CN II-XII Intact, Normal Gait, Oriented x3





- Psychiatric Exam


Psychiatric exam: Normal Affect, Normal Mood





- Skin


Skin Exam: Dry, Intact, Normal Color, Warm





Assessment and Plan


(1) DVT prophylaxis


Status: Chronic   





(2) Scrotal edema


Status: Chronic   





(3) CAD (coronary artery disease)


Status: Chronic   





(4) Smoking


Status: Chronic   





(5) Low back pain


Status: Chronic   





(6) Prediabetes


Status: Chronic   





(7) Neurocognitive disorder


Status: Chronic

## 2017-11-03 RX ADMIN — DIVALPROEX SODIUM SCH MG: 250 TABLET, DELAYED RELEASE ORAL at 17:15

## 2017-11-03 RX ADMIN — DIVALPROEX SODIUM SCH MG: 250 TABLET, DELAYED RELEASE ORAL at 08:53

## 2017-11-03 NOTE — CP.PCM.PN
Subjective





- Date & Time of Evaluation


Date of Evaluation: 11/03/17


Time of Evaluation: 11:34





- Subjective


Subjective: 





s/o no f/c, n/v/d. no other complaints.calm and cooperative. had abd pain, ct w

/ colitis. no f/c, n/v/d. no pain at present


a/p cont plan, cont placement, cont all medical management, cipro/flagyl x 7 

days








Objective





- Vital Signs/Intake and Output


Vital Signs (last 24 hours): 


 











Temp Pulse Resp BP Pulse Ox


 


 98.2 F   78   20   128/80   99 


 


 11/03/17 08:03  11/03/17 08:54  11/03/17 08:03  11/03/17 08:54  11/03/17 08:03











- Medications


Medications: 


 Current Medications





Al Hydrox/Mg Hydrox/Simethicone (Maalox Plus 30 Ml)  30 ml PO Q6 PRN


   PRN Reason: Indigestion / Heartburn


Atorvastatin Calcium (Lipitor)  20 mg PO [facility]


   Last Admin: 11/02/17 22:15 Dose:  20 mg


Ciprofloxacin (Cipro)  500 mg PO Q12 [facility]


   Last Admin: 11/03/17 08:54 Dose:  500 mg


Divalproex Sodium (Depakote Dr(*Bid*))  500 mg PO BID [facility]


   Last Admin: 11/03/17 08:53 Dose:  500 mg


Enalapril Maleate (Vasotec)  10 mg PO DAILY [facility]


   Last Admin: 11/03/17 08:54 Dose:  10 mg


Famotidine (Pepcid)  20 mg PO BID [facility]


   Last Admin: 11/03/17 08:54 Dose:  20 mg


Fenofibrate (Tricor)  145 mg PO DAILY [facility]


   Last Admin: 11/03/17 08:55 Dose:  145 mg


Metoprolol Tartrate (Lopressor)  50 mg PO Q12 [facility]


   Last Admin: 11/03/17 08:54 Dose:  50 mg


Metronidazole (Flagyl)  500 mg PO Q8 [facility]


   PRN Reason: Protocol


   Stop: 11/04/17 17:01


   Last Admin: 11/03/17 08:53 Dose:  500 mg


Quetiapine Fumarate (Seroquel)  25 mg PO HS [facility]


   Last Admin: 11/02/17 21:32 Dose:  25 mg











- Labs


Labs: 


 





 10/28/17 05:30 





 10/28/17 05:30 





 











PT  11.2 SECONDS (9.4-12.0)   12/14/15  05:20    


 


INR  1.05  (0.89-1.20)   12/14/15  05:20    


 


APTT  36.2 SECONDS (23.1-32.0)  H  12/14/15  05:20    














- Constitutional


Appears: Well, Non-toxic, No Acute Distress





- Head Exam


Head Exam: ATRAUMATIC, NORMAL INSPECTION, NORMOCEPHALIC





- Eye Exam


Eye Exam: EOMI, Normal appearance, PERRL


Pupil Exam: NORMAL ACCOMODATION, PERRL





- ENT Exam


ENT Exam: Mucous Membranes Moist, Normal Exam





- Neck Exam


Neck Exam: Full ROM, Normal Inspection.  absent: Lymphadenopathy





- Respiratory Exam


Respiratory Exam: Clear to Ausculation Bilateral, NORMAL BREATHING PATTERN





- Cardiovascular Exam


Cardiovascular Exam: REGULAR RHYTHM, RRR, +S1, +S2.  absent: Murmur





- GI/Abdominal Exam


GI & Abdominal Exam: Soft, Normal Bowel Sounds.  absent: Tenderness





- Extremities Exam


Extremities Exam: Full ROM, Normal Capillary Refill, Normal Inspection.  absent

: Joint Swelling, Pedal Edema





- Back Exam


Back Exam: NORMAL INSPECTION





- Neurological Exam


Neurological Exam: Alert, Awake, CN II-XII Intact, Normal Gait, Oriented x3





- Psychiatric Exam


Psychiatric exam: Normal Affect, Normal Mood





- Skin


Skin Exam: Dry, Intact, Normal Color, Warm





Assessment and Plan


(1) DVT prophylaxis


Status: Chronic   





(2) Scrotal edema


Status: Chronic   





(3) CAD (coronary artery disease)


Status: Chronic   





(4) Smoking


Status: Chronic   





(5) Low back pain


Status: Chronic   





(6) Prediabetes


Status: Chronic   





(7) Neurocognitive disorder


Status: Chronic

## 2017-11-04 RX ADMIN — DIVALPROEX SODIUM SCH MG: 250 TABLET, DELAYED RELEASE ORAL at 08:47

## 2017-11-04 RX ADMIN — DIVALPROEX SODIUM SCH MG: 250 TABLET, DELAYED RELEASE ORAL at 16:53

## 2017-11-05 RX ADMIN — DIVALPROEX SODIUM SCH MG: 250 TABLET, DELAYED RELEASE ORAL at 08:48

## 2017-11-05 RX ADMIN — DIVALPROEX SODIUM SCH MG: 250 TABLET, DELAYED RELEASE ORAL at 17:08

## 2017-11-05 NOTE — CP.PCM.PN
Subjective





- Date & Time of Evaluation


Date of Evaluation: 11/04/17


Time of Evaluation: 11:00





- Subjective


Subjective: 





s/o no f/c, n/v/d. no other complaints.calm and cooperative. had abd pain, ct w

/ colitis. no f/c, n/v/d. no pain at present


a/p cont plan, cont placement, cont all medical management, cipro/flagyl x 7 

days





Objective





- Vital Signs/Intake and Output


Vital Signs (last 24 hours): 


 











Temp Pulse Resp BP Pulse Ox


 


 98.7 F   73   20   108/76   96 


 


 11/05/17 17:38  11/05/17 17:38  11/05/17 17:38  11/05/17 17:38  11/05/17 17:38











- Medications


Medications: 


 Current Medications





Al Hydrox/Mg Hydrox/Simethicone (Maalox Plus 30 Ml)  30 ml PO Q6 PRN


   PRN Reason: Indigestion / Heartburn


Atorvastatin Calcium (Lipitor)  20 mg PO [facility]


   Last Admin: 11/04/17 22:00 Dose:  20 mg


Ciprofloxacin (Cipro)  500 mg PO Q12 [facility]


   Last Admin: 11/05/17 08:47 Dose:  500 mg


Divalproex Sodium (Depakote Dr(*Bid*))  500 mg PO BID [facility]


   Last Admin: 11/05/17 17:08 Dose:  500 mg


Enalapril Maleate (Vasotec)  10 mg PO DAILY [facility]


   Last Admin: 11/05/17 08:47 Dose:  10 mg


Famotidine (Pepcid)  20 mg PO BID [facility]


   Last Admin: 11/05/17 17:08 Dose:  20 mg


Fenofibrate (Tricor)  145 mg PO DAILY [facility]


   Last Admin: 11/05/17 08:48 Dose:  145 mg


Metoprolol Tartrate (Lopressor)  50 mg PO Q12 [facility]


   Last Admin: 11/05/17 08:48 Dose:  50 mg


Quetiapine Fumarate (Seroquel)  25 mg PO [facility]


   Last Admin: 11/04/17 22:00 Dose:  25 mg











- Labs


Labs: 


 





 10/28/17 05:30 





 10/28/17 05:30 





 











PT  11.2 SECONDS (9.4-12.0)   12/14/15  05:20    


 


INR  1.05  (0.89-1.20)   12/14/15  05:20    


 


APTT  36.2 SECONDS (23.1-32.0)  H  12/14/15  05:20    














- Constitutional


Appears: Well, Non-toxic, No Acute Distress





- Head Exam


Head Exam: ATRAUMATIC, NORMAL INSPECTION, NORMOCEPHALIC





- Eye Exam


Eye Exam: EOMI, Normal appearance, PERRL


Pupil Exam: NORMAL ACCOMODATION, PERRL





- ENT Exam


ENT Exam: Mucous Membranes Moist, Normal Exam





- Neck Exam


Neck Exam: Full ROM, Normal Inspection.  absent: Lymphadenopathy





- Respiratory Exam


Respiratory Exam: Clear to Ausculation Bilateral, NORMAL BREATHING PATTERN





- Cardiovascular Exam


Cardiovascular Exam: REGULAR RHYTHM, RRR, +S1, +S2.  absent: Murmur





- GI/Abdominal Exam


GI & Abdominal Exam: Soft, Normal Bowel Sounds.  absent: Tenderness





- Extremities Exam


Extremities Exam: Full ROM, Normal Capillary Refill, Normal Inspection.  absent

: Joint Swelling, Pedal Edema





- Back Exam


Back Exam: NORMAL INSPECTION





- Neurological Exam


Neurological Exam: Alert, Awake, CN II-XII Intact, Normal Gait, Oriented x3





- Psychiatric Exam


Psychiatric exam: Normal Affect, Normal Mood





- Skin


Skin Exam: Dry, Intact, Normal Color, Warm





Assessment and Plan


(1) DVT prophylaxis


Status: Chronic   





(2) Scrotal edema


Status: Chronic   





(3) CAD (coronary artery disease)


Status: Chronic   





(4) Smoking


Status: Chronic   





(5) Low back pain


Status: Chronic   





(6) Prediabetes


Status: Chronic   





(7) Neurocognitive disorder


Status: Chronic

## 2017-11-05 NOTE — CP.PCM.PN
Subjective





- Date & Time of Evaluation


Date of Evaluation: 11/05/17


Time of Evaluation: 19:48





- Subjective


Subjective: 





s/o no f/c, n/v/d. no other complaints.calm and cooperative. no abd pain, 

finished anbx


a/p cont plan, cont placement, cont all medical management, 





Objective





- Vital Signs/Intake and Output


Vital Signs (last 24 hours): 


 











Temp Pulse Resp BP Pulse Ox


 


 98.7 F   73   20   108/76   96 


 


 11/05/17 17:38  11/05/17 17:38  11/05/17 17:38  11/05/17 17:38  11/05/17 17:38











- Medications


Medications: 


 Current Medications





Al Hydrox/Mg Hydrox/Simethicone (Maalox Plus 30 Ml)  30 ml PO Q6 PRN


   PRN Reason: Indigestion / Heartburn


Atorvastatin Calcium (Lipitor)  20 mg PO [facility]


   Last Admin: 11/04/17 22:00 Dose:  20 mg


Ciprofloxacin (Cipro)  500 mg PO Q12 [facility]


   Last Admin: 11/05/17 08:47 Dose:  500 mg


Divalproex Sodium (Depakote Dr(*Bid*))  500 mg PO BID [facility]


   Last Admin: 11/05/17 17:08 Dose:  500 mg


Enalapril Maleate (Vasotec)  10 mg PO DAILY [facility]


   Last Admin: 11/05/17 08:47 Dose:  10 mg


Famotidine (Pepcid)  20 mg PO BID [facility]


   Last Admin: 11/05/17 17:08 Dose:  20 mg


Fenofibrate (Tricor)  145 mg PO DAILY [facility]


   Last Admin: 11/05/17 08:48 Dose:  145 mg


Metoprolol Tartrate (Lopressor)  50 mg PO Q12 [facility]


   Last Admin: 11/05/17 08:48 Dose:  50 mg


Quetiapine Fumarate (Seroquel)  25 mg PO HS [facility]


   Last Admin: 11/04/17 22:00 Dose:  25 mg











- Labs


Labs: 


 





 10/28/17 05:30 





 10/28/17 05:30 





 











PT  11.2 SECONDS (9.4-12.0)   12/14/15  05:20    


 


INR  1.05  (0.89-1.20)   12/14/15  05:20    


 


APTT  36.2 SECONDS (23.1-32.0)  H  12/14/15  05:20    














- Constitutional


Appears: Well, Non-toxic, No Acute Distress





- Head Exam


Head Exam: ATRAUMATIC, NORMAL INSPECTION, NORMOCEPHALIC





- Eye Exam


Eye Exam: EOMI, Normal appearance, PERRL


Pupil Exam: NORMAL ACCOMODATION, PERRL





- ENT Exam


ENT Exam: Mucous Membranes Moist, Normal Exam





- Neck Exam


Neck Exam: Full ROM, Normal Inspection.  absent: Lymphadenopathy





- Respiratory Exam


Respiratory Exam: Clear to Ausculation Bilateral, NORMAL BREATHING PATTERN





- Cardiovascular Exam


Cardiovascular Exam: REGULAR RHYTHM, RRR, +S1, +S2.  absent: Murmur





- GI/Abdominal Exam


GI & Abdominal Exam: Soft, Normal Bowel Sounds.  absent: Tenderness





- Extremities Exam


Extremities Exam: Full ROM, Normal Capillary Refill, Normal Inspection.  absent

: Joint Swelling, Pedal Edema





- Back Exam


Back Exam: NORMAL INSPECTION





- Neurological Exam


Neurological Exam: Alert, Awake, CN II-XII Intact, Normal Gait, Oriented x3





- Psychiatric Exam


Psychiatric exam: Normal Affect, Normal Mood





- Skin


Skin Exam: Dry, Intact, Normal Color, Warm





Assessment and Plan


(1) DVT prophylaxis


Status: Chronic   





(2) Scrotal edema


Status: Chronic   





(3) CAD (coronary artery disease)


Status: Chronic   





(4) Smoking


Status: Chronic   





(5) Low back pain


Status: Chronic   





(6) Prediabetes


Status: Chronic   





(7) Neurocognitive disorder


Status: Chronic

## 2017-11-06 RX ADMIN — DIVALPROEX SODIUM SCH MG: 250 TABLET, DELAYED RELEASE ORAL at 17:20

## 2017-11-06 RX ADMIN — DIVALPROEX SODIUM SCH MG: 250 TABLET, DELAYED RELEASE ORAL at 08:59

## 2017-11-06 NOTE — CP.PCM.PN
Subjective





- Date & Time of Evaluation


Date of Evaluation: 11/06/17


Time of Evaluation: 08:27





- Subjective


Subjective: 





s/o no f/c, n/v/d. no other complaints.calm and cooperative. no abd pain, 

finished anbx


a/p cont plan, cont placement, cont all medical management, 








Objective





- Vital Signs/Intake and Output


Vital Signs (last 24 hours): 


 











Temp Pulse Resp BP Pulse Ox


 


 98.8 F   69   20   95/63 L  95 


 


 11/06/17 08:07  11/06/17 08:07  11/06/17 08:07  11/06/17 08:07  11/06/17 08:07











- Medications


Medications: 


 Current Medications





Al Hydrox/Mg Hydrox/Simethicone (Maalox Plus 30 Ml)  30 ml PO Q6 PRN


   PRN Reason: Indigestion / Heartburn


Atorvastatin Calcium (Lipitor)  20 mg PO HS [facility]


   Last Admin: 11/05/17 21:14 Dose:  20 mg


Ciprofloxacin (Cipro)  500 mg PO Q12 [facility]


   Last Admin: 11/05/17 21:13 Dose:  500 mg


Divalproex Sodium (Depakote Dr(*Bid*))  500 mg PO BID [facility]


   Last Admin: 11/05/17 17:08 Dose:  500 mg


Enalapril Maleate (Vasotec)  10 mg PO DAILY [facility]


   Last Admin: 11/05/17 08:47 Dose:  10 mg


Famotidine (Pepcid)  20 mg PO BID [facility]


   Last Admin: 11/05/17 17:08 Dose:  20 mg


Fenofibrate (Tricor)  145 mg PO DAILY [facility]


   Last Admin: 11/05/17 08:48 Dose:  145 mg


Metoprolol Tartrate (Lopressor)  50 mg PO Q12 [facility]


   Last Admin: 11/05/17 21:13 Dose:  50 mg


Quetiapine Fumarate (Seroquel)  25 mg PO HS [facility]


   Last Admin: 11/05/17 21:14 Dose:  25 mg











- Labs


Labs: 


 





 10/28/17 05:30 





 10/28/17 05:30 





 











PT  11.2 SECONDS (9.4-12.0)   12/14/15  05:20    


 


INR  1.05  (0.89-1.20)   12/14/15  05:20    


 


APTT  36.2 SECONDS (23.1-32.0)  H  12/14/15  05:20    














- Constitutional


Appears: Well, Non-toxic, No Acute Distress





- Head Exam


Head Exam: ATRAUMATIC, NORMAL INSPECTION, NORMOCEPHALIC





- Eye Exam


Eye Exam: EOMI, Normal appearance, PERRL


Pupil Exam: NORMAL ACCOMODATION, PERRL





- ENT Exam


ENT Exam: Mucous Membranes Moist, Normal Exam





- Neck Exam


Neck Exam: Full ROM, Normal Inspection.  absent: Lymphadenopathy





- Respiratory Exam


Respiratory Exam: Clear to Ausculation Bilateral, NORMAL BREATHING PATTERN





- Cardiovascular Exam


Cardiovascular Exam: REGULAR RHYTHM, RRR, +S1, +S2.  absent: Murmur





- GI/Abdominal Exam


GI & Abdominal Exam: Soft, Normal Bowel Sounds.  absent: Tenderness





- Extremities Exam


Extremities Exam: Full ROM, Normal Capillary Refill, Normal Inspection.  absent

: Joint Swelling, Pedal Edema





- Back Exam


Back Exam: NORMAL INSPECTION





- Neurological Exam


Neurological Exam: Alert, Awake, CN II-XII Intact, Normal Gait, Oriented x3





- Psychiatric Exam


Psychiatric exam: Normal Affect, Normal Mood





- Skin


Skin Exam: Dry, Intact, Normal Color, Warm





Assessment and Plan


(1) DVT prophylaxis


Status: Chronic   





(2) Scrotal edema


Status: Chronic   





(3) CAD (coronary artery disease)


Status: Chronic   





(4) Smoking


Status: Chronic   





(5) Low back pain


Status: Chronic   





(6) Prediabetes


Status: Chronic   





(7) Neurocognitive disorder


Status: Chronic

## 2017-11-07 RX ADMIN — DIVALPROEX SODIUM SCH MG: 250 TABLET, DELAYED RELEASE ORAL at 09:51

## 2017-11-07 RX ADMIN — DIVALPROEX SODIUM SCH MG: 250 TABLET, DELAYED RELEASE ORAL at 16:36

## 2017-11-07 NOTE — CP.PCM.PN
Subjective





- Date & Time of Evaluation


Date of Evaluation: 11/07/17


Time of Evaluation: 07:12





- Subjective


Subjective: 





s/o no f/c, n/v/d. no other complaints.calm and cooperative. no abd pain, 

finished anbx


a/p cont plan, cont placement, cont all medical management, 





Objective





- Vital Signs/Intake and Output


Vital Signs (last 24 hours): 


 











Temp Pulse Resp BP Pulse Ox


 


 98.4 F   81   19   116/79   97 


 


 11/07/17 00:00  11/07/17 00:00  11/07/17 00:00  11/07/17 00:00  11/07/17 00:00











- Medications


Medications: 


 Current Medications





Al Hydrox/Mg Hydrox/Simethicone (Maalox Plus 30 Ml)  30 ml PO Q6 PRN


   PRN Reason: Indigestion / Heartburn


Atorvastatin Calcium (Lipitor)  20 mg PO [facility]


   Last Admin: 11/06/17 21:36 Dose:  20 mg


Ciprofloxacin (Cipro)  500 mg PO Q12 [facility]


   Last Admin: 11/06/17 21:36 Dose:  500 mg


Divalproex Sodium (Depakote Dr(*Bid*))  500 mg PO BID [facility]


   Last Admin: 11/06/17 17:20 Dose:  500 mg


Enalapril Maleate (Vasotec)  10 mg PO DAILY [facility]


   Last Admin: 11/06/17 08:58 Dose:  Not Given


Famotidine (Pepcid)  20 mg PO BID [facility]


   Last Admin: 11/06/17 17:19 Dose:  20 mg


Fenofibrate (Tricor)  145 mg PO DAILY [facility]


   Last Admin: 11/06/17 08:59 Dose:  145 mg


Metoprolol Tartrate (Lopressor)  50 mg PO Q12 [facility]


   Last Admin: 11/06/17 21:36 Dose:  50 mg


Quetiapine Fumarate (Seroquel)  25 mg PO HS [facility]


   Last Admin: 11/06/17 21:36 Dose:  25 mg











- Labs


Labs: 


 





 10/28/17 05:30 





 10/28/17 05:30 





 











PT  11.2 SECONDS (9.4-12.0)   12/14/15  05:20    


 


INR  1.05  (0.89-1.20)   12/14/15  05:20    


 


APTT  36.2 SECONDS (23.1-32.0)  H  12/14/15  05:20    














- Constitutional


Appears: Well, Non-toxic, No Acute Distress





- Head Exam


Head Exam: ATRAUMATIC, NORMAL INSPECTION, NORMOCEPHALIC





- Eye Exam


Eye Exam: EOMI, Normal appearance, PERRL


Pupil Exam: NORMAL ACCOMODATION, PERRL





- ENT Exam


ENT Exam: Mucous Membranes Moist, Normal Exam





- Neck Exam


Neck Exam: Full ROM, Normal Inspection.  absent: Lymphadenopathy





- Respiratory Exam


Respiratory Exam: Clear to Ausculation Bilateral, NORMAL BREATHING PATTERN





- Cardiovascular Exam


Cardiovascular Exam: REGULAR RHYTHM, RRR, +S1, +S2.  absent: Murmur





- GI/Abdominal Exam


GI & Abdominal Exam: Soft, Normal Bowel Sounds.  absent: Tenderness





- Extremities Exam


Extremities Exam: Full ROM, Normal Capillary Refill, Normal Inspection.  absent

: Joint Swelling, Pedal Edema





- Back Exam


Back Exam: NORMAL INSPECTION





- Neurological Exam


Neurological Exam: Alert, Awake, CN II-XII Intact, Normal Gait, Oriented x3





- Psychiatric Exam


Psychiatric exam: Normal Affect, Normal Mood





- Skin


Skin Exam: Dry, Intact, Normal Color, Warm





Assessment and Plan


(1) DVT prophylaxis


Status: Chronic   





(2) Scrotal edema


Status: Chronic   





(3) CAD (coronary artery disease)


Status: Chronic   





(4) Smoking


Status: Chronic   





(5) Low back pain


Status: Chronic   





(6) Prediabetes


Status: Chronic   





(7) Neurocognitive disorder


Status: Chronic

## 2017-11-08 RX ADMIN — DIVALPROEX SODIUM SCH MG: 250 TABLET, DELAYED RELEASE ORAL at 17:24

## 2017-11-08 RX ADMIN — DIVALPROEX SODIUM SCH MG: 250 TABLET, DELAYED RELEASE ORAL at 09:19

## 2017-11-08 NOTE — CP.PCM.PN
Subjective





- Date & Time of Evaluation


Date of Evaluation: 11/08/17


Time of Evaluation: 10:00





- Subjective


Subjective: 





pt seen and examined at bedside. nad, no new complaints





Objective





- Vital Signs/Intake and Output


Vital Signs (last 24 hours): 


 











Temp Pulse Resp BP Pulse Ox


 


 97.3 F L  75   20   116/82   96 


 


 11/08/17 07:21  11/08/17 09:23  11/08/17 07:21  11/08/17 13:10  11/08/17 07:21











- Medications


Medications: 


 Current Medications





Al Hydrox/Mg Hydrox/Simethicone (Maalox Plus 30 Ml)  30 ml PO Q6 PRN


   PRN Reason: Indigestion / Heartburn


Atorvastatin Calcium (Lipitor)  20 mg PO [facility]


   Last Admin: 11/07/17 21:10 Dose:  20 mg


Ciprofloxacin (Cipro)  500 mg PO Q12 [facility]


   Last Admin: 11/08/17 09:18 Dose:  500 mg


Divalproex Sodium (Depakote Dr(*Bid*))  500 mg PO BID [facility]


   Last Admin: 11/08/17 09:19 Dose:  500 mg


Enalapril Maleate (Vasotec)  10 mg PO DAILY [facility]


   Last Admin: 11/08/17 13:10 Dose:  10 mg


Famotidine (Pepcid)  20 mg PO BID [facility]


   Last Admin: 11/08/17 09:24 Dose:  20 mg


Fenofibrate (Tricor)  145 mg PO DAILY [facility]


   Last Admin: 11/08/17 09:24 Dose:  145 mg


Metoprolol Tartrate (Lopressor)  50 mg PO Q12 [facility]


   Last Admin: 11/08/17 13:10 Dose:  50 mg


Quetiapine Fumarate (Seroquel)  25 mg PO [facility]


   Last Admin: 11/07/17 21:09 Dose:  25 mg











- Labs


Labs: 


 





 10/28/17 05:30 





 10/28/17 05:30 





 











PT  11.2 SECONDS (9.4-12.0)   12/14/15  05:20    


 


INR  1.05  (0.89-1.20)   12/14/15  05:20    


 


APTT  36.2 SECONDS (23.1-32.0)  H  12/14/15  05:20    














- Constitutional


Appears: Well





- Head Exam


Head Exam: ATRAUMATIC





- Eye Exam


Eye Exam: Normal appearance





- ENT Exam


ENT Exam: Mucous Membranes Moist





- Respiratory Exam


Respiratory Exam: Clear to Ausculation Bilateral





- Cardiovascular Exam


Cardiovascular Exam: REGULAR RHYTHM





- GI/Abdominal Exam


GI & Abdominal Exam: Soft, Normal Bowel Sounds





Assessment and Plan





- Assessment and Plan (Free Text)


Assessment: 





Assessment and Plan


(1) DVT prophylaxis


Status: Chronic   





(2) Scrotal edema


Status: Chronic   





(3) CAD (coronary artery disease)


Status: Chronic   





(4) Smoking


Status: Chronic   





(5) Low back pain


Status: Chronic   





(6) Prediabetes


Status: Chronic   





(7) Neurocognitive disorder


Status: Chronic

## 2017-11-09 RX ADMIN — DIVALPROEX SODIUM SCH MG: 250 TABLET, DELAYED RELEASE ORAL at 16:10

## 2017-11-09 RX ADMIN — DIVALPROEX SODIUM SCH MG: 250 TABLET, DELAYED RELEASE ORAL at 08:57

## 2017-11-09 NOTE — CP.PCM.PN
Subjective





- Date & Time of Evaluation


Date of Evaluation: 11/09/17


Time of Evaluation: 10:00





- Subjective


Subjective: 





pt seen and examined at bedside. nad. no new complaints. tolerating po





Objective





- Vital Signs/Intake and Output


Vital Signs (last 24 hours): 


 











Temp Pulse Resp BP Pulse Ox


 


 97.8 F   64   20   96/68 L  98 


 


 11/09/17 08:26  11/09/17 08:26  11/09/17 08:26  11/09/17 08:26  11/09/17 08:26











- Medications


Medications: 


 Current Medications





Al Hydrox/Mg Hydrox/Simethicone (Maalox Plus 30 Ml)  30 ml PO Q6 PRN


   PRN Reason: Indigestion / Heartburn


Atorvastatin Calcium (Lipitor)  20 mg PO [facility]


   Last Admin: 11/08/17 21:50 Dose:  20 mg


Ciprofloxacin (Cipro)  500 mg PO Q12 [facility]


   Last Admin: 11/09/17 08:57 Dose:  500 mg


Divalproex Sodium (Depakote Dr(*Bid*))  500 mg PO BID [facility]


   Last Admin: 11/09/17 08:57 Dose:  500 mg


Enalapril Maleate (Vasotec)  10 mg PO DAILY [facility]


   Last Admin: 11/09/17 08:58 Dose:  Not Given


Famotidine (Pepcid)  20 mg PO BID [facility]


   Last Admin: 11/09/17 08:58 Dose:  20 mg


Fenofibrate (Tricor)  145 mg PO DAILY [facility]


   Last Admin: 11/09/17 08:58 Dose:  145 mg


Metoprolol Tartrate (Lopressor)  50 mg PO Q12 [facility]


   Last Admin: 11/09/17 08:58 Dose:  Not Given


Quetiapine Fumarate (Seroquel)  25 mg PO [facility]


   Last Admin: 11/08/17 21:51 Dose:  25 mg











- Labs


Labs: 


 





 10/28/17 05:30 





 10/28/17 05:30 





 











PT  11.2 SECONDS (9.4-12.0)   12/14/15  05:20    


 


INR  1.05  (0.89-1.20)   12/14/15  05:20    


 


APTT  36.2 SECONDS (23.1-32.0)  H  12/14/15  05:20    














- Constitutional


Appears: Well





- Head Exam


Head Exam: NORMAL INSPECTION





- ENT Exam


ENT Exam: Mucous Membranes Moist





- Respiratory Exam


Respiratory Exam: Clear to Ausculation Bilateral





- Cardiovascular Exam


Cardiovascular Exam: REGULAR RHYTHM, +S1, +S2





- GI/Abdominal Exam


GI & Abdominal Exam: Normal Bowel Sounds





Assessment and Plan





- Assessment and Plan (Free Text)


Assessment: 








- Constitutional


Appears: Well, Non-toxic, No Acute Distress





- Head Exam


Head Exam: ATRAUMATIC, NORMAL INSPECTION, NORMOCEPHALIC





- Eye Exam


Eye Exam: EOMI, Normal appearance, PERRL


Pupil Exam: NORMAL ACCOMODATION, PERRL





- ENT Exam


ENT Exam: Mucous Membranes Moist, Normal Exam





- Neck Exam


Neck Exam: Full ROM, Normal Inspection.  absent: Lymphadenopathy





- Respiratory Exam


Respiratory Exam: Clear to Ausculation Bilateral, NORMAL BREATHING PATTERN





- Cardiovascular Exam


Cardiovascular Exam: REGULAR RHYTHM, RRR, +S1, +S2.  absent: Murmur





- GI/Abdominal Exam


GI & Abdominal Exam: Soft, Normal Bowel Sounds.  absent: Tenderness





- Extremities Exam


Extremities Exam: Full ROM, Normal Capillary Refill, Normal Inspection.  absent

: Joint Swelling, Pedal Edema





- Back Exam


Back Exam: NORMAL INSPECTION





- Neurological Exam


Neurological Exam: Alert, Awake, CN II-XII Intact, Normal Gait, Oriented x3





- Psychiatric Exam


Psychiatric exam: Normal Affect, Normal Mood





- Skin


Skin Exam: Dry, Intact, Normal Color, Warm





Assessment and Plan


(1) DVT prophylaxis


Status: Chronic   





(2) Scrotal edema


Status: Chronic   





(3) CAD (coronary artery disease)


Status: Chronic   





(4) Smoking


Status: Chronic   





(5) Low back pain


Status: Chronic   





(6) Prediabetes


Status: Chronic   





(7) Neurocognitive disorder


Status: Chronic

## 2017-11-10 RX ADMIN — DIVALPROEX SODIUM SCH MG: 250 TABLET, DELAYED RELEASE ORAL at 17:04

## 2017-11-10 RX ADMIN — DIVALPROEX SODIUM SCH MG: 250 TABLET, DELAYED RELEASE ORAL at 08:41

## 2017-11-10 NOTE — CP.PCM.PN
Subjective





- Date & Time of Evaluation


Date of Evaluation: 11/10/17


Time of Evaluation: 10:00





- Subjective


Subjective: 





pt seen and examined at bedside. nad. no complaints. 





Objective





- Vital Signs/Intake and Output


Vital Signs (last 24 hours): 


 











Temp Pulse Resp BP Pulse Ox


 


 97.8 F   76   20   119/87   97 


 


 11/10/17 08:38  11/10/17 08:38  11/10/17 08:38  11/10/17 08:42  11/10/17 08:38











- Medications


Medications: 


 Current Medications





Al Hydrox/Mg Hydrox/Simethicone (Maalox Plus 30 Ml)  30 ml PO Q6 PRN


   PRN Reason: Indigestion / Heartburn


Atorvastatin Calcium (Lipitor)  20 mg PO [facility]


   Last Admin: 11/09/17 22:07 Dose:  20 mg


Ciprofloxacin (Cipro)  500 mg PO Q12 [facility]


   Last Admin: 11/10/17 08:42 Dose:  500 mg


Divalproex Sodium (Depakote Dr(*Bid*))  500 mg PO BID [facility]


   Last Admin: 11/10/17 08:41 Dose:  500 mg


Enalapril Maleate (Vasotec)  10 mg PO DAILY [facility]


   Last Admin: 11/10/17 08:42 Dose:  10 mg


Famotidine (Pepcid)  20 mg PO BID [facility]


   Last Admin: 11/10/17 08:41 Dose:  20 mg


Fenofibrate (Tricor)  145 mg PO DAILY [facility]


   Last Admin: 11/10/17 08:41 Dose:  145 mg


Metoprolol Tartrate (Lopressor)  50 mg PO Q12 [facility]


   Last Admin: 11/10/17 08:42 Dose:  50 mg


Quetiapine Fumarate (Seroquel)  25 mg PO [facility]


   Last Admin: 11/09/17 22:07 Dose:  25 mg











- Labs


Labs: 


 





 10/28/17 05:30 





 10/28/17 05:30 





 











PT  11.2 SECONDS (9.4-12.0)   12/14/15  05:20    


 


INR  1.05  (0.89-1.20)   12/14/15  05:20    


 


APTT  36.2 SECONDS (23.1-32.0)  H  12/14/15  05:20    














Assessment and Plan





- Assessment and Plan (Free Text)


Assessment: 








Assessment and Plan


(1) DVT prophylaxis


Status: Chronic   





(2) Scrotal edema


Status: Chronic   





(3) CAD (coronary artery disease)


Status: Chronic   





(4) Smoking


Status: Chronic   





(5) Low back pain


Status: Chronic   





(6) Prediabetes


Status: Chronic   





(7) Neurocognitive disorder


Status: Chronic

## 2017-11-11 RX ADMIN — DIVALPROEX SODIUM SCH MG: 250 TABLET, DELAYED RELEASE ORAL at 16:30

## 2017-11-11 RX ADMIN — DIVALPROEX SODIUM SCH MG: 250 TABLET, DELAYED RELEASE ORAL at 09:22

## 2017-11-11 NOTE — CP.PCM.PN
Subjective





- Date & Time of Evaluation


Date of Evaluation: 11/11/17


Time of Evaluation: 10:00





- Subjective


Subjective: 





pt seen and examined at bedside. nad. 





Objective





- Vital Signs/Intake and Output


Vital Signs (last 24 hours): 


 











Temp Pulse Resp BP Pulse Ox


 


 97.2 F L  71   20   99/65 L  97 


 


 11/11/17 07:27  11/11/17 07:27  11/11/17 07:27  11/11/17 07:27  11/11/17 07:27











- Medications


Medications: 


 Current Medications





Al Hydrox/Mg Hydrox/Simethicone (Maalox Plus 30 Ml)  30 ml PO Q6 PRN


   PRN Reason: Indigestion / Heartburn


Atorvastatin Calcium (Lipitor)  20 mg PO [facility]


   Last Admin: 11/10/17 22:58 Dose:  20 mg


Ciprofloxacin (Cipro)  500 mg PO Q12 [facility]


   Last Admin: 11/11/17 09:20 Dose:  500 mg


Divalproex Sodium (Depakote Dr(*Bid*))  500 mg PO BID [facility]


   Last Admin: 11/11/17 09:22 Dose:  500 mg


Enalapril Maleate (Vasotec)  10 mg PO DAILY [facility]


   Last Admin: 11/11/17 09:21 Dose:  10 mg


Famotidine (Pepcid)  20 mg PO BID [facility]


   Last Admin: 11/11/17 09:21 Dose:  20 mg


Fenofibrate (Tricor)  145 mg PO DAILY [facility]


   Last Admin: 11/11/17 09:21 Dose:  145 mg


Metoprolol Tartrate (Lopressor)  50 mg PO Q12 [facility]


   Last Admin: 11/11/17 09:21 Dose:  Not Given


Quetiapine Fumarate (Seroquel)  25 mg PO [facility]


   Last Admin: 11/10/17 21:44 Dose:  25 mg











- Labs


Labs: 


 





 10/28/17 05:30 





 10/28/17 05:30 





 











PT  11.2 SECONDS (9.4-12.0)   12/14/15  05:20    


 


INR  1.05  (0.89-1.20)   12/14/15  05:20    


 


APTT  36.2 SECONDS (23.1-32.0)  H  12/14/15  05:20    














- Constitutional


Appears: Well





- Head Exam


Head Exam: NORMAL INSPECTION





- Eye Exam


Eye Exam: Normal appearance





- ENT Exam


ENT Exam: Mucous Membranes Moist





- Neck Exam


Neck Exam: Normal Inspection





- Respiratory Exam


Respiratory Exam: Clear to Ausculation Bilateral, NORMAL BREATHING PATTERN





- Cardiovascular Exam


Cardiovascular Exam: REGULAR RHYTHM, +S1, +S2





- GI/Abdominal Exam


GI & Abdominal Exam: Normal Bowel Sounds





Assessment and Plan





- Assessment and Plan (Free Text)


Assessment: 








(1) DVT prophylaxis


Status: Chronic   





(2) Scrotal edema


Status: Chronic   





(3) CAD (coronary artery disease)


Status: Chronic   





(4) Smoking


Status: Chronic   





(5) Low back pain


Status: Chronic   





(6) Prediabetes


Status: Chronic   





(7) Neurocognitive disorder


Status: Chronic

## 2017-11-12 RX ADMIN — DIVALPROEX SODIUM SCH MG: 250 TABLET, DELAYED RELEASE ORAL at 16:33

## 2017-11-12 RX ADMIN — DIVALPROEX SODIUM SCH MG: 250 TABLET, DELAYED RELEASE ORAL at 09:37

## 2017-11-12 NOTE — CP.PCM.PN
Subjective





- Date & Time of Evaluation


Date of Evaluation: 11/12/17


Time of Evaluation: 11:00





- Subjective


Subjective: 





pt seen and examined at bedside. nad no complaints. 





Objective





- Vital Signs/Intake and Output


Vital Signs (last 24 hours): 


 











Temp Pulse Resp BP Pulse Ox


 


 97.3 F L  64   20   102/68   97 


 


 11/12/17 07:28  11/12/17 09:37  11/12/17 07:28  11/12/17 09:37  11/12/17 07:28











- Medications


Medications: 


 Current Medications





Atorvastatin Calcium (Lipitor)  20 mg PO [facility]


   Last Admin: 11/11/17 21:58 Dose:  20 mg


Divalproex Sodium (Depakote Dr(*Bid*))  500 mg PO BID [facility]


   Last Admin: 11/12/17 09:37 Dose:  500 mg


Enalapril Maleate (Vasotec)  10 mg PO DAILY [facility]


   Last Admin: 11/12/17 09:38 Dose:  10 mg


Fenofibrate (Tricor)  145 mg PO DAILY [facility]


   Last Admin: 11/12/17 09:38 Dose:  145 mg


Metoprolol Tartrate (Lopressor)  50 mg PO Q12 [facility]


   Last Admin: 11/12/17 09:37 Dose:  50 mg


Quetiapine Fumarate (Seroquel)  25 mg PO HS [facility]


   Last Admin: 11/11/17 21:59 Dose:  25 mg











- Labs


Labs: 


 





 10/28/17 05:30 





 10/28/17 05:30 





 











PT  11.2 SECONDS (9.4-12.0)   12/14/15  05:20    


 


INR  1.05  (0.89-1.20)   12/14/15  05:20    


 


APTT  36.2 SECONDS (23.1-32.0)  H  12/14/15  05:20    














- Head Exam


Head Exam: ATRAUMATIC, NORMAL INSPECTION





- Eye Exam


Pupil Exam: NORMAL ACCOMODATION





- ENT Exam


ENT Exam: Mucous Membranes Moist





- Respiratory Exam


Respiratory Exam: Clear to Ausculation Bilateral, NORMAL BREATHING PATTERN





- Cardiovascular Exam


Cardiovascular Exam: REGULAR RHYTHM, +S1, +S2





Assessment and Plan





- Assessment and Plan (Free Text)


Assessment: 








(1) DVT prophylaxis


Status: Chronic   





(2) Scrotal edema


Status: Chronic   





(3) CAD (coronary artery disease)


Status: Chronic   





(4) Smoking


Status: Chronic   





(5) Low back pain


Status: Chronic   





(6) Prediabetes


Status: Chronic   





(7) Neurocognitive disorder


Status: Chronic

## 2017-11-13 RX ADMIN — DIVALPROEX SODIUM SCH MG: 250 TABLET, DELAYED RELEASE ORAL at 16:37

## 2017-11-13 RX ADMIN — DIVALPROEX SODIUM SCH MG: 250 TABLET, DELAYED RELEASE ORAL at 10:10

## 2017-11-13 NOTE — CP.PCM.PN
Subjective





- Date & Time of Evaluation


Date of Evaluation: 11/13/17


Time of Evaluation: 11:00





- Subjective


Subjective: 





pt seen and examined at bedside. nad, no complaints





Objective





- Vital Signs/Intake and Output


Vital Signs (last 24 hours): 


 











Temp Pulse Resp BP Pulse Ox


 


 97.2 F L  82   20   117/80   96 


 


 11/13/17 08:48  11/13/17 10:11  11/13/17 08:48  11/13/17 10:11  11/13/17 08:48











- Medications


Medications: 


 Current Medications





Atorvastatin Calcium (Lipitor)  20 mg PO [facility]


   Last Admin: 11/12/17 21:29 Dose:  20 mg


Divalproex Sodium (Depakote Dr(*Bid*))  500 mg PO BID [facility]


   Last Admin: 11/13/17 10:10 Dose:  500 mg


Enalapril Maleate (Vasotec)  10 mg PO DAILY [facility]


   Last Admin: 11/13/17 10:13 Dose:  10 mg


Fenofibrate (Tricor)  145 mg PO DAILY [facility]


   Last Admin: 11/13/17 10:13 Dose:  145 mg


Metoprolol Tartrate (Lopressor)  50 mg PO Q12 [facility]


   Last Admin: 11/13/17 10:11 Dose:  50 mg


Quetiapine Fumarate (Seroquel)  25 mg PO [facility]


   Last Admin: 11/12/17 21:29 Dose:  25 mg











- Labs


Labs: 


 





 10/28/17 05:30 





 10/28/17 05:30 





 











PT  11.2 SECONDS (9.4-12.0)   12/14/15  05:20    


 


INR  1.05  (0.89-1.20)   12/14/15  05:20    


 


APTT  36.2 SECONDS (23.1-32.0)  H  12/14/15  05:20    














- Constitutional


Appears: Well





- Head Exam


Head Exam: ATRAUMATIC, NORMAL INSPECTION





- Eye Exam


Pupil Exam: NORMAL ACCOMODATION





- ENT Exam


ENT Exam: Mucous Membranes Moist





- Neck Exam


Neck Exam: Full ROM





- Respiratory Exam


Respiratory Exam: Clear to Ausculation Bilateral, NORMAL BREATHING PATTERN





- Cardiovascular Exam


Cardiovascular Exam: REGULAR RHYTHM, +S1, +S2





- GI/Abdominal Exam


GI & Abdominal Exam: Soft, Normal Bowel Sounds





- Extremities Exam


Extremities Exam: Full ROM





- Neurological Exam


Neurological Exam: Alert, Oriented x3





Assessment and Plan





- Assessment and Plan (Free Text)


Assessment: 








(1) DVT prophylaxis


Status: Chronic   





(2) Scrotal edema


Status: Chronic   





(3) CAD (coronary artery disease)


Status: Chronic   





(4) Smoking


Status: Chronic   





(5) Low back pain


Status: Chronic   





(6) Prediabetes


Status: Chronic   





(7) Neurocognitive disorder


Status: Chronic

## 2017-11-14 RX ADMIN — DIVALPROEX SODIUM SCH MG: 250 TABLET, DELAYED RELEASE ORAL at 08:29

## 2017-11-14 RX ADMIN — DIVALPROEX SODIUM SCH MG: 250 TABLET, DELAYED RELEASE ORAL at 16:57

## 2017-11-14 NOTE — CP.PCM.PN
Subjective





- Date & Time of Evaluation


Date of Evaluation: 11/14/17


Time of Evaluation: 09:23





- Subjective


Subjective: 





s/o no f/c, n/v/d. no other complaints.calm and cooperative. no abd pain, 

finished anbx


a/p cont plan, cont placement, cont all medical management, 





Objective





- Vital Signs/Intake and Output


Vital Signs (last 24 hours): 


 











Temp Pulse Resp BP Pulse Ox


 


 97.5 F L  67   18   105/70   96 


 


 11/14/17 07:55  11/14/17 08:30  11/14/17 07:55  11/14/17 08:30  11/14/17 07:55











- Medications


Medications: 


 Current Medications





Atorvastatin Calcium (Lipitor)  20 mg PO HS [facility]


   Last Admin: 11/13/17 21:21 Dose:  20 mg


Divalproex Sodium (Depakote Dr(*Bid*))  500 mg PO BID [facility]


   Last Admin: 11/14/17 08:29 Dose:  500 mg


Enalapril Maleate (Vasotec)  10 mg PO DAILY [facility]


   Last Admin: 11/14/17 08:29 Dose:  10 mg


Fenofibrate (Tricor)  145 mg PO DAILY [facility]


   Last Admin: 11/14/17 08:30 Dose:  145 mg


Metoprolol Tartrate (Lopressor)  50 mg PO Q12 [facility]


   Last Admin: 11/14/17 08:30 Dose:  50 mg


Quetiapine Fumarate (Seroquel)  25 mg PO HS [facility]


   Last Admin: 11/13/17 21:21 Dose:  25 mg











- Labs


Labs: 


 





 10/28/17 05:30 





 10/28/17 05:30 





 











PT  11.2 SECONDS (9.4-12.0)   12/14/15  05:20    


 


INR  1.05  (0.89-1.20)   12/14/15  05:20    


 


APTT  36.2 SECONDS (23.1-32.0)  H  12/14/15  05:20    














- Constitutional


Appears: Well, Non-toxic, No Acute Distress





- Head Exam


Head Exam: ATRAUMATIC, NORMAL INSPECTION, NORMOCEPHALIC





- Eye Exam


Eye Exam: EOMI, Normal appearance, PERRL


Pupil Exam: NORMAL ACCOMODATION, PERRL





- ENT Exam


ENT Exam: Mucous Membranes Moist, Normal Exam





- Neck Exam


Neck Exam: Full ROM, Normal Inspection.  absent: Lymphadenopathy





- Respiratory Exam


Respiratory Exam: Clear to Ausculation Bilateral, NORMAL BREATHING PATTERN





- Cardiovascular Exam


Cardiovascular Exam: REGULAR RHYTHM, RRR, +S1, +S2.  absent: Murmur





- GI/Abdominal Exam


GI & Abdominal Exam: Soft, Normal Bowel Sounds.  absent: Tenderness





- Extremities Exam


Extremities Exam: Full ROM, Normal Capillary Refill, Normal Inspection.  absent

: Joint Swelling, Pedal Edema





- Back Exam


Back Exam: NORMAL INSPECTION





- Neurological Exam


Neurological Exam: Alert, Awake, CN II-XII Intact, Normal Gait, Oriented x3





- Psychiatric Exam


Psychiatric exam: Normal Affect, Normal Mood





- Skin


Skin Exam: Dry, Intact, Normal Color, Warm





Assessment and Plan


(1) DVT prophylaxis


Status: Chronic   





(2) Scrotal edema


Status: Chronic   





(3) CAD (coronary artery disease)


Status: Chronic   





(4) Smoking


Status: Chronic   





(5) Low back pain


Status: Chronic   





(6) Prediabetes


Status: Chronic   





(7) Neurocognitive disorder


Status: Chronic

## 2017-11-15 RX ADMIN — DIVALPROEX SODIUM SCH MG: 250 TABLET, DELAYED RELEASE ORAL at 09:30

## 2017-11-15 RX ADMIN — DIVALPROEX SODIUM SCH MG: 250 TABLET, DELAYED RELEASE ORAL at 16:45

## 2017-11-15 NOTE — CP.PCM.PN
Subjective





- Date & Time of Evaluation


Date of Evaluation: 11/15/17


Time of Evaluation: 08:29





- Subjective


Subjective: 





s/o no f/c, n/v/d. no other complaints.calm and cooperative. no abd pain, 

finished anbx


a/p cont plan, cont placement, cont all medical management, 





Objective





- Vital Signs/Intake and Output


Vital Signs (last 24 hours): 


 











Temp Pulse Resp BP Pulse Ox


 


 97.2 F L  61   18   100/69   98 


 


 11/15/17 07:33  11/15/17 07:33  11/15/17 07:33  11/15/17 07:33  11/15/17 07:33











- Medications


Medications: 


 Current Medications





Atorvastatin Calcium (Lipitor)  20 mg PO [facility]


   Last Admin: 11/14/17 21:03 Dose:  20 mg


Divalproex Sodium (Depakote Dr(*Bid*))  500 mg PO BID [facility]


   Last Admin: 11/14/17 16:57 Dose:  500 mg


Enalapril Maleate (Vasotec)  10 mg PO DAILY [facility]


   Last Admin: 11/14/17 08:29 Dose:  10 mg


Fenofibrate (Tricor)  145 mg PO DAILY [facility]


   Last Admin: 11/14/17 08:30 Dose:  145 mg


Metoprolol Tartrate (Lopressor)  50 mg PO Q12 [facility]


   Last Admin: 11/14/17 21:02 Dose:  Not Given


Quetiapine Fumarate (Seroquel)  25 mg PO [facility]


   Last Admin: 11/14/17 21:02 Dose:  25 mg











- Labs


Labs: 


 





 10/28/17 05:30 





 10/28/17 05:30 





 











PT  11.2 SECONDS (9.4-12.0)   12/14/15  05:20    


 


INR  1.05  (0.89-1.20)   12/14/15  05:20    


 


APTT  36.2 SECONDS (23.1-32.0)  H  12/14/15  05:20    














- Constitutional


Appears: Well, Non-toxic, No Acute Distress





- Head Exam


Head Exam: ATRAUMATIC, NORMAL INSPECTION, NORMOCEPHALIC





- Eye Exam


Eye Exam: EOMI, Normal appearance, PERRL


Pupil Exam: NORMAL ACCOMODATION, PERRL





- ENT Exam


ENT Exam: Mucous Membranes Moist, Normal Exam





- Neck Exam


Neck Exam: Full ROM, Normal Inspection.  absent: Lymphadenopathy





- Respiratory Exam


Respiratory Exam: Clear to Ausculation Bilateral, NORMAL BREATHING PATTERN





- Cardiovascular Exam


Cardiovascular Exam: REGULAR RHYTHM, RRR, +S1, +S2.  absent: Murmur





- GI/Abdominal Exam


GI & Abdominal Exam: Soft, Normal Bowel Sounds.  absent: Tenderness





- Extremities Exam


Extremities Exam: Full ROM, Normal Capillary Refill, Normal Inspection.  absent

: Joint Swelling, Pedal Edema





- Back Exam


Back Exam: NORMAL INSPECTION





- Neurological Exam


Neurological Exam: Alert, Awake, CN II-XII Intact, Normal Gait, Oriented x3





- Psychiatric Exam


Psychiatric exam: Normal Affect, Normal Mood





- Skin


Skin Exam: Dry, Intact, Normal Color, Warm





Assessment and Plan


(1) DVT prophylaxis


Status: Chronic   





(2) Scrotal edema


Status: Chronic   





(3) CAD (coronary artery disease)


Status: Chronic   





(4) Smoking


Status: Chronic   





(5) Low back pain


Status: Chronic   





(6) Prediabetes


Status: Chronic   





(7) Neurocognitive disorder


Status: Chronic

## 2017-11-16 RX ADMIN — DIVALPROEX SODIUM SCH MG: 250 TABLET, DELAYED RELEASE ORAL at 17:06

## 2017-11-16 RX ADMIN — DIVALPROEX SODIUM SCH MG: 250 TABLET, DELAYED RELEASE ORAL at 10:03

## 2017-11-16 NOTE — CP.PCM.PN
Subjective





- Date & Time of Evaluation


Date of Evaluation: 11/16/17


Time of Evaluation: 07:39





- Subjective


Subjective: 





s/o no f/c, n/v/d. no other complaints.calm and cooperative. no abd pain, 

finished anbx


a/p cont plan, cont placement, cont all medical management,





Objective





- Vital Signs/Intake and Output


Vital Signs (last 24 hours): 


 











Temp Pulse Resp BP Pulse Ox


 


 97.3 F L  64   18   111/69   98 


 


 11/16/17 07:29  11/16/17 07:29  11/16/17 07:29  11/16/17 07:29  11/16/17 07:29











- Medications


Medications: 


 Current Medications





Atorvastatin Calcium (Lipitor)  20 mg PO HS [facility]


   Last Admin: 11/15/17 21:50 Dose:  20 mg


Divalproex Sodium (Depakote Dr(*Bid*))  500 mg PO BID [facility]


   Last Admin: 11/15/17 16:45 Dose:  500 mg


Enalapril Maleate (Vasotec)  10 mg PO DAILY [facility]


   Last Admin: 11/15/17 09:31 Dose:  10 mg


Fenofibrate (Tricor)  145 mg PO DAILY [facility]


   Last Admin: 11/15/17 09:31 Dose:  145 mg


Metoprolol Tartrate (Lopressor)  50 mg PO Q12 [facility]


   Last Admin: 11/15/17 21:50 Dose:  50 mg


Quetiapine Fumarate (Seroquel)  25 mg PO HS [facility]


   Last Admin: 11/15/17 21:50 Dose:  25 mg











- Labs


Labs: 


 





 10/28/17 05:30 





 10/28/17 05:30 





 











PT  11.2 SECONDS (9.4-12.0)   12/14/15  05:20    


 


INR  1.05  (0.89-1.20)   12/14/15  05:20    


 


APTT  36.2 SECONDS (23.1-32.0)  H  12/14/15  05:20    














- Constitutional


Appears: Well, Non-toxic, No Acute Distress





- Head Exam


Head Exam: ATRAUMATIC, NORMAL INSPECTION, NORMOCEPHALIC





- Eye Exam


Eye Exam: EOMI, Normal appearance, PERRL


Pupil Exam: NORMAL ACCOMODATION, PERRL





- ENT Exam


ENT Exam: Mucous Membranes Moist, Normal Exam





- Neck Exam


Neck Exam: Full ROM, Normal Inspection.  absent: Lymphadenopathy





- Respiratory Exam


Respiratory Exam: Clear to Ausculation Bilateral, NORMAL BREATHING PATTERN





- Cardiovascular Exam


Cardiovascular Exam: REGULAR RHYTHM, RRR, +S1, +S2.  absent: Murmur





- GI/Abdominal Exam


GI & Abdominal Exam: Soft, Normal Bowel Sounds.  absent: Tenderness





- Extremities Exam


Extremities Exam: Full ROM, Normal Capillary Refill, Normal Inspection.  absent

: Joint Swelling, Pedal Edema





- Back Exam


Back Exam: NORMAL INSPECTION





- Neurological Exam


Neurological Exam: Alert, Awake, CN II-XII Intact, Normal Gait, Oriented x3





- Psychiatric Exam


Psychiatric exam: Normal Affect, Normal Mood





- Skin


Skin Exam: Dry, Intact, Normal Color, Warm





Assessment and Plan


(1) DVT prophylaxis


Status: Chronic   





(2) Scrotal edema


Status: Chronic   





(3) CAD (coronary artery disease)


Status: Chronic   





(4) Smoking


Status: Chronic   





(5) Low back pain


Status: Chronic   





(6) Prediabetes


Status: Chronic   





(7) Neurocognitive disorder


Status: Chronic

## 2017-11-17 RX ADMIN — DIVALPROEX SODIUM SCH MG: 250 TABLET, DELAYED RELEASE ORAL at 10:08

## 2017-11-17 RX ADMIN — DIVALPROEX SODIUM SCH MG: 250 TABLET, DELAYED RELEASE ORAL at 16:49

## 2017-11-17 NOTE — CP.PCM.PN
Subjective





- Date & Time of Evaluation


Date of Evaluation: 11/17/17


Time of Evaluation: 07:34





- Subjective


Subjective: 





s/o no f/c, n/v/d. no other complaints.calm and cooperative. no abd pain, 

finished anbx


a/p cont plan, cont placement, cont all medical management





Objective





- Vital Signs/Intake and Output


Vital Signs (last 24 hours): 


 











Temp Pulse Resp BP Pulse Ox


 


 97.3 F L  67   20   107/71   96 


 


 11/17/17 07:19  11/17/17 07:19  11/17/17 07:19  11/17/17 07:19  11/17/17 07:19











- Medications


Medications: 


 Current Medications





Atorvastatin Calcium (Lipitor)  20 mg PO HS [facility]


   Last Admin: 11/16/17 21:12 Dose:  20 mg


Divalproex Sodium (Depakote Dr(*Bid*))  500 mg PO BID [facility]


   Last Admin: 11/16/17 17:06 Dose:  500 mg


Enalapril Maleate (Vasotec)  10 mg PO DAILY [facility]


   Last Admin: 11/16/17 10:04 Dose:  10 mg


Fenofibrate (Tricor)  145 mg PO DAILY [facility]


   Last Admin: 11/16/17 10:04 Dose:  145 mg


Metoprolol Tartrate (Lopressor)  50 mg PO Q12 [facility]


   Last Admin: 11/16/17 21:12 Dose:  50 mg


Quetiapine Fumarate (Seroquel)  25 mg PO HS [facility]


   Last Admin: 11/16/17 21:12 Dose:  25 mg











- Labs


Labs: 


 





 10/28/17 05:30 





 10/28/17 05:30 





 











PT  11.2 SECONDS (9.4-12.0)   12/14/15  05:20    


 


INR  1.05  (0.89-1.20)   12/14/15  05:20    


 


APTT  36.2 SECONDS (23.1-32.0)  H  12/14/15  05:20    














- Constitutional


Appears: Well, Non-toxic, No Acute Distress





- Head Exam


Head Exam: ATRAUMATIC, NORMAL INSPECTION, NORMOCEPHALIC





- Eye Exam


Eye Exam: EOMI, Normal appearance, PERRL


Pupil Exam: NORMAL ACCOMODATION, PERRL





- ENT Exam


ENT Exam: Mucous Membranes Moist, Normal Exam





- Neck Exam


Neck Exam: Full ROM, Normal Inspection.  absent: Lymphadenopathy





- Respiratory Exam


Respiratory Exam: Clear to Ausculation Bilateral, NORMAL BREATHING PATTERN





- Cardiovascular Exam


Cardiovascular Exam: REGULAR RHYTHM, RRR, +S1, +S2.  absent: Murmur





- GI/Abdominal Exam


GI & Abdominal Exam: Soft, Normal Bowel Sounds.  absent: Tenderness





- Extremities Exam


Extremities Exam: Full ROM, Normal Capillary Refill, Normal Inspection.  absent

: Joint Swelling, Pedal Edema





- Back Exam


Back Exam: NORMAL INSPECTION





- Neurological Exam


Neurological Exam: Alert, Awake, CN II-XII Intact, Normal Gait, Oriented x3





- Psychiatric Exam


Psychiatric exam: Normal Affect, Normal Mood





- Skin


Skin Exam: Dry, Intact, Normal Color, Warm





Assessment and Plan


(1) DVT prophylaxis


Status: Chronic   





(2) Scrotal edema


Status: Chronic   





(3) CAD (coronary artery disease)


Status: Chronic   





(4) Smoking


Status: Chronic   





(5) Low back pain


Status: Chronic   





(6) Prediabetes


Status: Chronic   





(7) Neurocognitive disorder


Status: Chronic

## 2017-11-18 RX ADMIN — DIVALPROEX SODIUM SCH MG: 250 TABLET, DELAYED RELEASE ORAL at 15:59

## 2017-11-18 RX ADMIN — DIVALPROEX SODIUM SCH MG: 250 TABLET, DELAYED RELEASE ORAL at 08:54

## 2017-11-18 NOTE — CP.PCM.PN
Subjective





- Date & Time of Evaluation


Date of Evaluation: 11/18/17


Time of Evaluation: 08:55





- Subjective


Subjective: 





s/o no f/c, n/v/d. no other complaints.calm and cooperative. no abd pain, 

finished anbx


a/p cont plan, cont placement, cont all medical management





Objective





- Vital Signs/Intake and Output


Vital Signs (last 24 hours): 


 











Temp Pulse Resp BP Pulse Ox


 


 97.8 F   62   19   103/71   96 


 


 11/18/17 07:35  11/18/17 07:35  11/18/17 07:35  11/18/17 07:35  11/18/17 07:35











- Medications


Medications: 


 Current Medications





Atorvastatin Calcium (Lipitor)  20 mg PO [facility]


   Last Admin: 11/17/17 22:31 Dose:  20 mg


Divalproex Sodium (Depakote Dr(*Bid*))  500 mg PO BID [facility]


   Last Admin: 11/17/17 16:49 Dose:  500 mg


Enalapril Maleate (Vasotec)  10 mg PO DAILY [facility]


   Last Admin: 11/17/17 10:09 Dose:  10 mg


Fenofibrate (Tricor)  145 mg PO DAILY [facility]


   Last Admin: 11/17/17 10:08 Dose:  145 mg


Metoprolol Tartrate (Lopressor)  50 mg PO Q12 [facility]


   Last Admin: 11/17/17 22:00 Dose:  Not Given


Quetiapine Fumarate (Seroquel)  25 mg PO [facility]


   Last Admin: 11/17/17 22:32 Dose:  25 mg











- Labs


Labs: 


 





 10/28/17 05:30 





 10/28/17 05:30 





 











PT  11.2 SECONDS (9.4-12.0)   12/14/15  05:20    


 


INR  1.05  (0.89-1.20)   12/14/15  05:20    


 


APTT  36.2 SECONDS (23.1-32.0)  H  12/14/15  05:20    














- Constitutional


Appears: Well, Non-toxic, No Acute Distress





- Head Exam


Head Exam: ATRAUMATIC, NORMAL INSPECTION, NORMOCEPHALIC





- Eye Exam


Eye Exam: EOMI, Normal appearance, PERRL


Pupil Exam: NORMAL ACCOMODATION, PERRL





- ENT Exam


ENT Exam: Mucous Membranes Moist, Normal Exam





- Neck Exam


Neck Exam: Full ROM, Normal Inspection.  absent: Lymphadenopathy





- Respiratory Exam


Respiratory Exam: Clear to Ausculation Bilateral, NORMAL BREATHING PATTERN





- Cardiovascular Exam


Cardiovascular Exam: REGULAR RHYTHM, RRR, +S1, +S2.  absent: Murmur





- GI/Abdominal Exam


GI & Abdominal Exam: Soft, Normal Bowel Sounds.  absent: Tenderness





- Extremities Exam


Extremities Exam: Full ROM, Normal Capillary Refill, Normal Inspection.  absent

: Joint Swelling, Pedal Edema





- Back Exam


Back Exam: NORMAL INSPECTION





- Neurological Exam


Neurological Exam: Alert, Awake, CN II-XII Intact, Normal Gait, Oriented x3





- Psychiatric Exam


Psychiatric exam: Normal Affect, Normal Mood





- Skin


Skin Exam: Dry, Intact, Normal Color, Warm





Assessment and Plan


(1) DVT prophylaxis


Status: Chronic   





(2) Scrotal edema


Status: Chronic   





(3) CAD (coronary artery disease)


Status: Chronic   





(4) Smoking


Status: Chronic   





(5) Low back pain


Status: Chronic   





(6) Prediabetes


Status: Chronic   





(7) Neurocognitive disorder


Status: Chronic

## 2017-11-19 RX ADMIN — DIVALPROEX SODIUM SCH MG: 250 TABLET, DELAYED RELEASE ORAL at 16:22

## 2017-11-19 RX ADMIN — DIVALPROEX SODIUM SCH MG: 250 TABLET, DELAYED RELEASE ORAL at 09:07

## 2017-11-19 NOTE — CP.PCM.PN
Subjective





- Date & Time of Evaluation


Date of Evaluation: 11/19/17


Time of Evaluation: 09:56





- Subjective


Subjective: 





s/o no f/c, n/v/d. no other complaints.calm and cooperative. no abd pain, 

finished anbx


a/p cont plan, cont placement, cont all medical management








Objective





- Vital Signs/Intake and Output


Vital Signs (last 24 hours): 


 











Temp Pulse Resp BP Pulse Ox


 


 97.3 F L  65   20   103/72   98 


 


 11/19/17 07:28  11/19/17 09:07  11/19/17 07:28  11/19/17 09:07  11/19/17 07:28











- Medications


Medications: 


 Current Medications





Atorvastatin Calcium (Lipitor)  20 mg PO HS [facility]


   Last Admin: 11/18/17 22:30 Dose:  20 mg


Divalproex Sodium (Depakote Dr(*Bid*))  500 mg PO BID [facility]


   Last Admin: 11/19/17 09:07 Dose:  500 mg


Enalapril Maleate (Vasotec)  10 mg PO DAILY [facility]


   Last Admin: 11/19/17 09:07 Dose:  10 mg


Fenofibrate (Tricor)  145 mg PO DAILY [facility]


   Last Admin: 11/19/17 09:07 Dose:  145 mg


Metoprolol Tartrate (Lopressor)  50 mg PO Q12 [facility]


   Last Admin: 11/19/17 09:07 Dose:  50 mg


Quetiapine Fumarate (Seroquel)  25 mg PO HS [facility]


   Last Admin: 11/18/17 22:30 Dose:  25 mg











- Labs


Labs: 


 





 10/28/17 05:30 





 10/28/17 05:30 





 











PT  11.2 SECONDS (9.4-12.0)   12/14/15  05:20    


 


INR  1.05  (0.89-1.20)   12/14/15  05:20    


 


APTT  36.2 SECONDS (23.1-32.0)  H  12/14/15  05:20    














- Constitutional


Appears: Well, Non-toxic, No Acute Distress





- Head Exam


Head Exam: ATRAUMATIC, NORMAL INSPECTION, NORMOCEPHALIC





- Eye Exam


Eye Exam: EOMI, Normal appearance, PERRL


Pupil Exam: NORMAL ACCOMODATION, PERRL





- ENT Exam


ENT Exam: Mucous Membranes Moist, Normal Exam





- Neck Exam


Neck Exam: Full ROM, Normal Inspection.  absent: Lymphadenopathy





- Respiratory Exam


Respiratory Exam: Clear to Ausculation Bilateral, NORMAL BREATHING PATTERN





- Cardiovascular Exam


Cardiovascular Exam: REGULAR RHYTHM, RRR, +S1, +S2.  absent: Murmur





- GI/Abdominal Exam


GI & Abdominal Exam: Soft, Normal Bowel Sounds.  absent: Tenderness





-  Exam


 Exam: Circumcision





- Extremities Exam


Extremities Exam: Full ROM, Normal Capillary Refill, Normal Inspection.  absent

: Joint Swelling, Pedal Edema





- Back Exam


Back Exam: NORMAL INSPECTION





- Neurological Exam


Neurological Exam: Alert, Awake, CN II-XII Intact, Normal Gait, Oriented x3





- Psychiatric Exam


Psychiatric exam: Normal Affect, Normal Mood





- Skin


Skin Exam: Dry, Intact, Normal Color, Warm





Assessment and Plan


(1) DVT prophylaxis


Status: Chronic   





(2) Scrotal edema


Status: Chronic   





(3) CAD (coronary artery disease)


Status: Chronic   





(4) Smoking


Status: Chronic   





(5) Low back pain


Status: Chronic   





(6) Prediabetes


Status: Chronic   





(7) Neurocognitive disorder


Status: Chronic

## 2017-11-20 RX ADMIN — DIVALPROEX SODIUM SCH MG: 250 TABLET, DELAYED RELEASE ORAL at 16:51

## 2017-11-20 RX ADMIN — DIVALPROEX SODIUM SCH MG: 250 TABLET, DELAYED RELEASE ORAL at 09:03

## 2017-11-20 NOTE — CP.PCM.PN
Subjective





- Date & Time of Evaluation


Date of Evaluation: 11/20/17


Time of Evaluation: 08:31





- Subjective


Subjective: 





s/o no f/c, n/v/d. no other complaints.calm and cooperative. no abd pain, 

finished anbx


a/p cont plan, cont placement, cont all medical management





Objective





- Vital Signs/Intake and Output


Vital Signs (last 24 hours): 


 











Temp Pulse Resp BP Pulse Ox


 


 97.4 F L  63   18   97/66 L  98 


 


 11/20/17 07:34  11/20/17 07:34  11/20/17 07:34  11/20/17 07:34  11/20/17 07:34











- Medications


Medications: 


 Current Medications





Atorvastatin Calcium (Lipitor)  20 mg PO HS [facility]


   Last Admin: 11/19/17 21:11 Dose:  20 mg


Divalproex Sodium (Depakote Dr(*Bid*))  500 mg PO BID [facility]


   Last Admin: 11/19/17 16:22 Dose:  500 mg


Enalapril Maleate (Vasotec)  10 mg PO DAILY [facility]


   Last Admin: 11/19/17 09:07 Dose:  10 mg


Fenofibrate (Tricor)  145 mg PO DAILY [facility]


   Last Admin: 11/19/17 09:07 Dose:  145 mg


Metoprolol Tartrate (Lopressor)  50 mg PO Q12 [facility]


   Last Admin: 11/19/17 21:10 Dose:  50 mg


Quetiapine Fumarate (Seroquel)  25 mg PO HS [facility]


   Last Admin: 11/19/17 21:11 Dose:  25 mg











- Labs


Labs: 


 





 10/28/17 05:30 





 10/28/17 05:30 





 











PT  11.2 SECONDS (9.4-12.0)   12/14/15  05:20    


 


INR  1.05  (0.89-1.20)   12/14/15  05:20    


 


APTT  36.2 SECONDS (23.1-32.0)  H  12/14/15  05:20    














- Constitutional


Appears: Well, Non-toxic, No Acute Distress





- Head Exam


Head Exam: ATRAUMATIC, NORMAL INSPECTION, NORMOCEPHALIC





- Eye Exam


Eye Exam: EOMI, Normal appearance, PERRL


Pupil Exam: NORMAL ACCOMODATION, PERRL





- ENT Exam


ENT Exam: Mucous Membranes Moist, Normal Exam





- Neck Exam


Neck Exam: Full ROM, Normal Inspection.  absent: Lymphadenopathy





- Respiratory Exam


Respiratory Exam: Clear to Ausculation Bilateral, NORMAL BREATHING PATTERN





- Cardiovascular Exam


Cardiovascular Exam: REGULAR RHYTHM, RRR, +S1, +S2.  absent: Murmur





- GI/Abdominal Exam


GI & Abdominal Exam: Soft, Normal Bowel Sounds.  absent: Tenderness





- Extremities Exam


Extremities Exam: Full ROM, Normal Capillary Refill, Normal Inspection.  absent

: Joint Swelling, Pedal Edema





- Back Exam


Back Exam: NORMAL INSPECTION





- Neurological Exam


Neurological Exam: Alert, Awake, CN II-XII Intact, Normal Gait, Oriented x3





- Psychiatric Exam


Psychiatric exam: Normal Affect, Normal Mood





- Skin


Skin Exam: Dry, Intact, Normal Color, Warm





Assessment and Plan


(1) DVT prophylaxis


Status: Chronic   





(2) Scrotal edema


Status: Chronic   





(3) CAD (coronary artery disease)


Status: Chronic   





(4) Smoking


Status: Chronic   





(5) Low back pain


Status: Chronic   





(6) Prediabetes


Status: Chronic   





(7) Neurocognitive disorder


Status: Chronic

## 2017-11-21 RX ADMIN — DIVALPROEX SODIUM SCH MG: 250 TABLET, DELAYED RELEASE ORAL at 08:24

## 2017-11-21 RX ADMIN — DIVALPROEX SODIUM SCH MG: 250 TABLET, DELAYED RELEASE ORAL at 16:42

## 2017-11-22 RX ADMIN — DIVALPROEX SODIUM SCH MG: 250 TABLET, DELAYED RELEASE ORAL at 09:09

## 2017-11-22 RX ADMIN — DIVALPROEX SODIUM SCH MG: 250 TABLET, DELAYED RELEASE ORAL at 16:11

## 2017-11-22 NOTE — CP.PCM.PN
Subjective





- Date & Time of Evaluation


Date of Evaluation: 11/22/17


Time of Evaluation: 07:58





- Subjective


Subjective: 





s/o no f/c, n/v/d. no other complaints.calm and cooperative. no abd pain, 

finished anbx


a/p cont plan, cont placement, cont all medical management





Objective





- Vital Signs/Intake and Output


Vital Signs (last 24 hours): 


 











Temp Pulse Resp BP Pulse Ox


 


 97.3 F L  67   20   100/70   99 


 


 11/22/17 07:29  11/22/17 07:29  11/22/17 07:29  11/22/17 07:29  11/22/17 07:29











- Medications


Medications: 


 Current Medications





Atorvastatin Calcium (Lipitor)  20 mg PO HS [facility]


   Last Admin: 11/21/17 21:01 Dose:  20 mg


Divalproex Sodium (Depakote Dr(*Bid*))  500 mg PO BID [facility]


   Last Admin: 11/21/17 16:42 Dose:  500 mg


Enalapril Maleate (Vasotec)  10 mg PO DAILY [facility]


   Last Admin: 11/21/17 08:28 Dose:  10 mg


Fenofibrate (Tricor)  145 mg PO DAILY [facility]


   Last Admin: 11/21/17 08:27 Dose:  145 mg


Metoprolol Tartrate (Lopressor)  50 mg PO Q12 [facility]


   Last Admin: 11/21/17 21:02 Dose:  50 mg


Quetiapine Fumarate (Seroquel)  25 mg PO HS [facility]


   Last Admin: 11/21/17 21:02 Dose:  25 mg











- Labs


Labs: 


 





 10/28/17 05:30 





 10/28/17 05:30 





 











PT  11.2 SECONDS (9.4-12.0)   12/14/15  05:20    


 


INR  1.05  (0.89-1.20)   12/14/15  05:20    


 


APTT  36.2 SECONDS (23.1-32.0)  H  12/14/15  05:20    














- Constitutional


Appears: Well, Non-toxic, No Acute Distress





- Head Exam


Head Exam: ATRAUMATIC, NORMAL INSPECTION, NORMOCEPHALIC





- Eye Exam


Eye Exam: EOMI, Normal appearance, PERRL


Pupil Exam: NORMAL ACCOMODATION, PERRL





- ENT Exam


ENT Exam: Mucous Membranes Moist, Normal Exam





- Neck Exam


Neck Exam: Full ROM, Normal Inspection.  absent: Lymphadenopathy





- Respiratory Exam


Respiratory Exam: Clear to Ausculation Bilateral, NORMAL BREATHING PATTERN





- Cardiovascular Exam


Cardiovascular Exam: REGULAR RHYTHM, RRR, +S1, +S2.  absent: Murmur





- GI/Abdominal Exam


GI & Abdominal Exam: Soft, Normal Bowel Sounds.  absent: Tenderness





- Extremities Exam


Extremities Exam: Full ROM, Normal Capillary Refill, Normal Inspection.  absent

: Joint Swelling, Pedal Edema





- Back Exam


Back Exam: NORMAL INSPECTION





- Neurological Exam


Neurological Exam: Alert, Awake, CN II-XII Intact, Normal Gait, Oriented x3





- Psychiatric Exam


Psychiatric exam: Normal Affect, Normal Mood





- Skin


Skin Exam: Dry, Intact, Normal Color, Warm





Assessment and Plan


(1) DVT prophylaxis


Status: Chronic   





(2) Scrotal edema


Status: Chronic   





(3) CAD (coronary artery disease)


Status: Chronic   





(4) Smoking


Status: Chronic   





(5) Low back pain


Status: Chronic   





(6) Prediabetes


Status: Chronic   





(7) Neurocognitive disorder


Status: Chronic

## 2017-11-23 RX ADMIN — DIVALPROEX SODIUM SCH MG: 250 TABLET, DELAYED RELEASE ORAL at 09:30

## 2017-11-23 RX ADMIN — DIVALPROEX SODIUM SCH MG: 250 TABLET, DELAYED RELEASE ORAL at 16:34

## 2017-11-23 NOTE — CP.PCM.PN
Subjective





- Date & Time of Evaluation


Date of Evaluation: 11/23/17


Time of Evaluation: 10:12





- Subjective


Subjective: 





s/o no f/c, n/v/d. no other complaints.calm and cooperative. no abd pain, 

finished anbx


a/p cont plan, cont placement, cont all medical management





Objective





- Vital Signs/Intake and Output


Vital Signs (last 24 hours): 


 











Temp Pulse Resp BP Pulse Ox


 


 97.3 F L  60   20   109/76   98 


 


 11/23/17 08:11  11/23/17 09:30  11/23/17 08:11  11/23/17 09:30  11/23/17 08:11











- Medications


Medications: 


 Current Medications





Atorvastatin Calcium (Lipitor)  20 mg PO HS [facility]


   Last Admin: 11/22/17 22:50 Dose:  20 mg


Divalproex Sodium (Depakote Dr(*Bid*))  500 mg PO BID [facility]


   Last Admin: 11/23/17 09:30 Dose:  500 mg


Enalapril Maleate (Vasotec)  10 mg PO DAILY [facility]


   Last Admin: 11/23/17 09:31 Dose:  10 mg


Fenofibrate (Tricor)  145 mg PO DAILY [facility]


   Last Admin: 11/23/17 09:30 Dose:  145 mg


Metoprolol Tartrate (Lopressor)  50 mg PO Q12 [facility]


   Last Admin: 11/23/17 09:30 Dose:  50 mg


Quetiapine Fumarate (Seroquel)  25 mg PO HS [facility]


   Last Admin: 11/22/17 22:50 Dose:  25 mg











- Labs


Labs: 


 





 10/28/17 05:30 





 10/28/17 05:30 





 











PT  11.2 SECONDS (9.4-12.0)   12/14/15  05:20    


 


INR  1.05  (0.89-1.20)   12/14/15  05:20    


 


APTT  36.2 SECONDS (23.1-32.0)  H  12/14/15  05:20    














- Constitutional


Appears: Well, Non-toxic, No Acute Distress





- Head Exam


Head Exam: ATRAUMATIC, NORMAL INSPECTION, NORMOCEPHALIC





- Eye Exam


Eye Exam: EOMI, Normal appearance, PERRL


Pupil Exam: NORMAL ACCOMODATION, PERRL





- ENT Exam


ENT Exam: Mucous Membranes Moist, Normal Exam





- Neck Exam


Neck Exam: Full ROM, Normal Inspection.  absent: Lymphadenopathy





- Respiratory Exam


Respiratory Exam: Clear to Ausculation Bilateral, NORMAL BREATHING PATTERN





- Cardiovascular Exam


Cardiovascular Exam: REGULAR RHYTHM, RRR, +S1, +S2.  absent: Murmur





- GI/Abdominal Exam


GI & Abdominal Exam: Soft, Normal Bowel Sounds.  absent: Tenderness





- Extremities Exam


Extremities Exam: Full ROM, Normal Capillary Refill, Normal Inspection.  absent

: Joint Swelling, Pedal Edema





- Back Exam


Back Exam: NORMAL INSPECTION





- Neurological Exam


Neurological Exam: Alert, Awake, CN II-XII Intact, Normal Gait, Oriented x3





- Psychiatric Exam


Psychiatric exam: Normal Affect, Normal Mood





- Skin


Skin Exam: Dry, Intact, Normal Color, Warm





Assessment and Plan


(1) DVT prophylaxis


Status: Chronic   





(2) Scrotal edema


Status: Chronic   





(3) CAD (coronary artery disease)


Status: Chronic   





(4) Smoking


Status: Chronic   





(5) Low back pain


Status: Chronic   





(6) Prediabetes


Status: Chronic   





(7) Neurocognitive disorder


Status: Chronic

## 2017-11-24 RX ADMIN — DIVALPROEX SODIUM SCH MG: 250 TABLET, DELAYED RELEASE ORAL at 08:30

## 2017-11-24 RX ADMIN — DIVALPROEX SODIUM SCH MG: 250 TABLET, DELAYED RELEASE ORAL at 16:27

## 2017-11-24 NOTE — CP.PCM.PN
Subjective





- Date & Time of Evaluation


Date of Evaluation: 11/24/17


Time of Evaluation: 07:14





- Subjective


Subjective: 





s/o no f/c, n/v/d. no other complaints.calm and cooperative. no abd pain, 

finished anbx


a/p cont plan, cont placement, cont all medical management





Objective





- Vital Signs/Intake and Output


Vital Signs (last 24 hours): 


 











Temp Pulse Resp BP Pulse Ox


 


 98 F   78   19   97/69 L  95 


 


 11/24/17 01:00  11/24/17 01:00  11/24/17 01:00  11/24/17 01:00  11/24/17 01:00











- Medications


Medications: 


 Current Medications





Atorvastatin Calcium (Lipitor)  20 mg PO HS [facility]


   Last Admin: 11/23/17 21:50 Dose:  20 mg


Divalproex Sodium (Depakote Dr(*Bid*))  500 mg PO BID [facility]


   Last Admin: 11/23/17 16:34 Dose:  500 mg


Enalapril Maleate (Vasotec)  10 mg PO DAILY [facility]


   Last Admin: 11/23/17 09:31 Dose:  10 mg


Fenofibrate (Tricor)  145 mg PO DAILY [facility]


   Last Admin: 11/23/17 09:30 Dose:  145 mg


Metoprolol Tartrate (Lopressor)  50 mg PO Q12 [facility]


   Last Admin: 11/23/17 21:50 Dose:  50 mg


Quetiapine Fumarate (Seroquel)  25 mg PO HS [facility]


   Last Admin: 11/23/17 21:50 Dose:  25 mg











- Labs


Labs: 


 





 10/28/17 05:30 





 10/28/17 05:30 





 











PT  11.2 SECONDS (9.4-12.0)   12/14/15  05:20    


 


INR  1.05  (0.89-1.20)   12/14/15  05:20    


 


APTT  36.2 SECONDS (23.1-32.0)  H  12/14/15  05:20    














- Constitutional


Appears: Well, Non-toxic, No Acute Distress





- Head Exam


Head Exam: ATRAUMATIC, NORMAL INSPECTION, NORMOCEPHALIC





- Eye Exam


Eye Exam: EOMI, Normal appearance, PERRL


Pupil Exam: NORMAL ACCOMODATION, PERRL





- ENT Exam


ENT Exam: Mucous Membranes Moist, Normal Exam





- Neck Exam


Neck Exam: Full ROM, Normal Inspection.  absent: Lymphadenopathy





- Respiratory Exam


Respiratory Exam: Clear to Ausculation Bilateral, NORMAL BREATHING PATTERN





- Cardiovascular Exam


Cardiovascular Exam: REGULAR RHYTHM, RRR, +S1, +S2.  absent: Murmur





- GI/Abdominal Exam


GI & Abdominal Exam: Soft, Normal Bowel Sounds.  absent: Tenderness





- Extremities Exam


Extremities Exam: Full ROM, Normal Capillary Refill, Normal Inspection.  absent

: Joint Swelling, Pedal Edema





- Back Exam


Back Exam: NORMAL INSPECTION





- Neurological Exam


Neurological Exam: Alert, Awake, CN II-XII Intact, Normal Gait, Oriented x3





- Psychiatric Exam


Psychiatric exam: Normal Affect, Normal Mood





- Skin


Skin Exam: Dry, Intact, Normal Color, Warm





Assessment and Plan


(1) DVT prophylaxis


Status: Chronic   





(2) Scrotal edema


Status: Chronic   





(3) CAD (coronary artery disease)


Status: Chronic   





(4) Smoking


Status: Chronic   





(5) Low back pain


Status: Chronic   





(6) Prediabetes


Status: Chronic   





(7) Neurocognitive disorder


Status: Chronic

## 2017-11-25 RX ADMIN — DIVALPROEX SODIUM SCH MG: 250 TABLET, DELAYED RELEASE ORAL at 08:17

## 2017-11-25 RX ADMIN — DIVALPROEX SODIUM SCH MG: 250 TABLET, DELAYED RELEASE ORAL at 16:38

## 2017-11-25 NOTE — CP.PCM.PN
Subjective





- Date & Time of Evaluation


Date of Evaluation: 11/25/17


Time of Evaluation: 07:58





- Subjective


Subjective: 





s/o no f/c, n/v/d. no other complaints.calm and cooperative. no abd pain, 

finished anbx


a/p cont plan, cont placement, cont all medical management





Objective





- Vital Signs/Intake and Output


Vital Signs (last 24 hours): 


 











Temp Pulse Resp BP Pulse Ox


 


 97.8 F   70   18   109/68   95 


 


 11/25/17 07:56  11/25/17 07:56  11/25/17 07:56  11/25/17 07:56  11/25/17 07:56











- Medications


Medications: 


 Current Medications





Atorvastatin Calcium (Lipitor)  20 mg PO [facility]


   Last Admin: 11/24/17 22:50 Dose:  20 mg


Divalproex Sodium (Depakote Dr(*Bid*))  500 mg PO BID [facility]


   Last Admin: 11/24/17 16:27 Dose:  500 mg


Enalapril Maleate (Vasotec)  10 mg PO DAILY [facility]


   Last Admin: 11/24/17 08:31 Dose:  10 mg


Fenofibrate (Tricor)  145 mg PO DAILY [facility]


   Last Admin: 11/24/17 08:31 Dose:  145 mg


Metoprolol Tartrate (Lopressor)  50 mg PO Q12 [facility]


   Last Admin: 11/24/17 22:50 Dose:  Not Given


Quetiapine Fumarate (Seroquel)  25 mg PO HS [facility]


   Last Admin: 11/24/17 22:50 Dose:  25 mg











- Labs


Labs: 


 





 10/28/17 05:30 





 10/28/17 05:30 





 











PT  11.2 SECONDS (9.4-12.0)   12/14/15  05:20    


 


INR  1.05  (0.89-1.20)   12/14/15  05:20    


 


APTT  36.2 SECONDS (23.1-32.0)  H  12/14/15  05:20    














- Constitutional


Appears: Well, Non-toxic, No Acute Distress





- Head Exam


Head Exam: ATRAUMATIC, NORMAL INSPECTION, NORMOCEPHALIC





- Eye Exam


Eye Exam: EOMI, Normal appearance, PERRL


Pupil Exam: NORMAL ACCOMODATION, PERRL





- ENT Exam


ENT Exam: Mucous Membranes Moist, Normal Exam





- Neck Exam


Neck Exam: Full ROM, Normal Inspection.  absent: Lymphadenopathy





- Respiratory Exam


Respiratory Exam: Clear to Ausculation Bilateral, NORMAL BREATHING PATTERN





- Cardiovascular Exam


Cardiovascular Exam: REGULAR RHYTHM, RRR, +S1, +S2.  absent: Murmur





- GI/Abdominal Exam


GI & Abdominal Exam: Soft, Normal Bowel Sounds.  absent: Tenderness





- Extremities Exam


Extremities Exam: Full ROM, Normal Capillary Refill, Normal Inspection.  absent

: Joint Swelling, Pedal Edema





- Back Exam


Back Exam: NORMAL INSPECTION





- Neurological Exam


Neurological Exam: Alert, Awake, CN II-XII Intact, Normal Gait, Oriented x3





- Psychiatric Exam


Psychiatric exam: Normal Affect, Normal Mood





- Skin


Skin Exam: Dry, Intact, Normal Color, Warm





Assessment and Plan


(1) DVT prophylaxis


Status: Chronic   





(2) Scrotal edema


Status: Chronic   





(3) CAD (coronary artery disease)


Status: Chronic   





(4) Smoking


Status: Chronic   





(5) Low back pain


Status: Chronic   





(6) Prediabetes


Status: Chronic   





(7) Neurocognitive disorder


Status: Chronic

## 2017-11-26 RX ADMIN — DIVALPROEX SODIUM SCH MG: 250 TABLET, DELAYED RELEASE ORAL at 08:52

## 2017-11-26 RX ADMIN — DIVALPROEX SODIUM SCH MG: 250 TABLET, DELAYED RELEASE ORAL at 17:10

## 2017-11-26 NOTE — CP.PCM.PN
Subjective





- Date & Time of Evaluation


Date of Evaluation: 11/26/17


Time of Evaluation: 10:11





- Subjective


Subjective: 





s/o no f/c, n/v/d. no other complaints.calm and cooperative. no abd pain, 

finished anbx


a/p cont plan, cont placement, cont all medical management





Objective





- Vital Signs/Intake and Output


Vital Signs (last 24 hours): 


 











Temp Pulse Resp BP Pulse Ox


 


 98.2 F   63   19   106/69   96 


 


 11/26/17 08:00  11/26/17 08:00  11/26/17 08:00  11/26/17 08:00  11/26/17 08:00











- Medications


Medications: 


 Current Medications





Atorvastatin Calcium (Lipitor)  20 mg PO HS [facility]


   Last Admin: 11/25/17 21:52 Dose:  20 mg


Divalproex Sodium (Depakote Dr(*Bid*))  500 mg PO BID [facility]


   Last Admin: 11/26/17 08:52 Dose:  500 mg


Enalapril Maleate (Vasotec)  10 mg PO DAILY [facility]


   Last Admin: 11/25/17 08:18 Dose:  10 mg


Fenofibrate (Tricor)  145 mg PO DAILY [facility]


   Last Admin: 11/26/17 08:52 Dose:  145 mg


Metoprolol Tartrate (Lopressor)  50 mg PO Q12 [facility]


   Last Admin: 11/26/17 08:52 Dose:  50 mg


Quetiapine Fumarate (Seroquel)  25 mg PO HS [facility]


   Last Admin: 11/25/17 21:52 Dose:  25 mg











- Labs


Labs: 


 





 10/28/17 05:30 





 10/28/17 05:30 





 











PT  11.2 SECONDS (9.4-12.0)   12/14/15  05:20    


 


INR  1.05  (0.89-1.20)   12/14/15  05:20    


 


APTT  36.2 SECONDS (23.1-32.0)  H  12/14/15  05:20    














- Constitutional


Appears: Well, Non-toxic, No Acute Distress





- Head Exam


Head Exam: ATRAUMATIC, NORMAL INSPECTION, NORMOCEPHALIC





- Eye Exam


Eye Exam: EOMI, Normal appearance, PERRL


Pupil Exam: NORMAL ACCOMODATION, PERRL





- ENT Exam


ENT Exam: Mucous Membranes Moist, Normal Exam





- Neck Exam


Neck Exam: Full ROM, Normal Inspection.  absent: Lymphadenopathy





- Respiratory Exam


Respiratory Exam: Clear to Ausculation Bilateral, NORMAL BREATHING PATTERN





- Cardiovascular Exam


Cardiovascular Exam: REGULAR RHYTHM, RRR, +S1, +S2.  absent: Murmur





- GI/Abdominal Exam


GI & Abdominal Exam: Soft, Normal Bowel Sounds.  absent: Tenderness





- Extremities Exam


Extremities Exam: Full ROM, Normal Capillary Refill, Normal Inspection.  absent

: Joint Swelling, Pedal Edema





- Back Exam


Back Exam: NORMAL INSPECTION





- Neurological Exam


Neurological Exam: Alert, Awake, CN II-XII Intact, Normal Gait, Oriented x3





- Psychiatric Exam


Psychiatric exam: Normal Affect, Normal Mood





- Skin


Skin Exam: Dry, Intact, Normal Color, Warm





Assessment and Plan


(1) DVT prophylaxis


Status: Chronic   





(2) Scrotal edema


Status: Chronic   





(3) CAD (coronary artery disease)


Status: Chronic   





(4) Smoking


Status: Chronic   





(5) Low back pain


Status: Chronic   





(6) Prediabetes


Status: Chronic   





(7) Neurocognitive disorder


Status: Chronic

## 2017-11-27 RX ADMIN — DIVALPROEX SODIUM SCH MG: 250 TABLET, DELAYED RELEASE ORAL at 17:03

## 2017-11-27 RX ADMIN — DIVALPROEX SODIUM SCH MG: 250 TABLET, DELAYED RELEASE ORAL at 09:17

## 2017-11-27 NOTE — CP.PCM.PN
Subjective





- Date & Time of Evaluation


Date of Evaluation: 11/27/17


Time of Evaluation: 07:18





- Subjective


Subjective: 





s/o no f/c, n/v/d. no other complaints.calm and cooperative. no abd pain, 

finished anbx


a/p cont plan, cont placement, cont all medical management





Objective





- Vital Signs/Intake and Output


Vital Signs (last 24 hours): 


 











Temp Pulse Resp BP Pulse Ox


 


 97.7 F   74   19   122/78   95 


 


 11/27/17 00:00  11/27/17 00:00  11/27/17 00:00  11/27/17 00:00  11/27/17 00:00











- Medications


Medications: 


 Current Medications





Atorvastatin Calcium (Lipitor)  20 mg PO HS [facility]


   Last Admin: 11/26/17 21:04 Dose:  20 mg


Divalproex Sodium (Depakote Dr(*Bid*))  500 mg PO BID [facility]


   Last Admin: 11/26/17 17:10 Dose:  500 mg


Enalapril Maleate (Vasotec)  10 mg PO DAILY [facility]


   Last Admin: 11/25/17 08:18 Dose:  10 mg


Fenofibrate (Tricor)  145 mg PO DAILY [facility]


   Last Admin: 11/26/17 08:52 Dose:  145 mg


Metoprolol Tartrate (Lopressor)  50 mg PO Q12 [facility]


   Last Admin: 11/26/17 21:06 Dose:  50 mg


Quetiapine Fumarate (Seroquel)  25 mg PO HS [facility]


   Last Admin: 11/26/17 21:04 Dose:  25 mg











- Labs


Labs: 


 





 10/28/17 05:30 





 10/28/17 05:30 





 











PT  11.2 SECONDS (9.4-12.0)   12/14/15  05:20    


 


INR  1.05  (0.89-1.20)   12/14/15  05:20    


 


APTT  36.2 SECONDS (23.1-32.0)  H  12/14/15  05:20    














- Constitutional


Appears: Well, Non-toxic, No Acute Distress





- Head Exam


Head Exam: ATRAUMATIC, NORMAL INSPECTION, NORMOCEPHALIC





- Eye Exam


Eye Exam: EOMI, Normal appearance, PERRL


Pupil Exam: NORMAL ACCOMODATION, PERRL





- ENT Exam


ENT Exam: Mucous Membranes Moist, Normal Exam





- Neck Exam


Neck Exam: Full ROM, Normal Inspection.  absent: Lymphadenopathy





- Respiratory Exam


Respiratory Exam: Clear to Ausculation Bilateral, NORMAL BREATHING PATTERN





- Cardiovascular Exam


Cardiovascular Exam: REGULAR RHYTHM, RRR, +S1, +S2.  absent: Murmur





- GI/Abdominal Exam


GI & Abdominal Exam: Soft, Normal Bowel Sounds.  absent: Tenderness





- Extremities Exam


Extremities Exam: Full ROM, Normal Capillary Refill, Normal Inspection.  absent

: Joint Swelling, Pedal Edema





- Back Exam


Back Exam: NORMAL INSPECTION





- Neurological Exam


Neurological Exam: Alert, Awake, CN II-XII Intact, Normal Gait, Oriented x3





- Psychiatric Exam


Psychiatric exam: Normal Affect, Normal Mood





- Skin


Skin Exam: Dry, Intact, Normal Color, Warm





Assessment and Plan


(1) DVT prophylaxis


Status: Chronic   





(2) Scrotal edema


Status: Chronic   





(3) CAD (coronary artery disease)


Status: Chronic   





(4) Smoking


Status: Chronic   





(5) Low back pain


Status: Chronic   





(6) Prediabetes


Status: Chronic   





(7) Neurocognitive disorder


Status: Chronic

## 2017-11-28 RX ADMIN — DIVALPROEX SODIUM SCH MG: 250 TABLET, DELAYED RELEASE ORAL at 10:15

## 2017-11-28 RX ADMIN — DIVALPROEX SODIUM SCH MG: 250 TABLET, DELAYED RELEASE ORAL at 16:58

## 2017-11-28 NOTE — CP.PCM.PN
Subjective





- Date & Time of Evaluation


Date of Evaluation: 11/28/17


Time of Evaluation: 08:07





- Subjective


Subjective: 





s/o no f/c, n/v/d. no other complaints.calm and cooperative. no abd pain, 

finished anbx


a/p cont plan, cont placement, cont all medical management





Objective





- Vital Signs/Intake and Output


Vital Signs (last 24 hours): 


 











Temp Pulse Resp BP Pulse Ox


 


 98 F   61   18   104/69   95 


 


 11/28/17 08:01  11/28/17 08:01  11/28/17 08:01  11/28/17 08:01  11/28/17 08:01











- Medications


Medications: 


 Current Medications





Atorvastatin Calcium (Lipitor)  20 mg PO HS [facility]


   Last Admin: 11/27/17 21:29 Dose:  20 mg


Divalproex Sodium (Depakote Dr(*Bid*))  500 mg PO BID [facility]


   Last Admin: 11/27/17 17:03 Dose:  500 mg


Enalapril Maleate (Vasotec)  10 mg PO DAILY [facility]


   Last Admin: 11/27/17 09:16 Dose:  10 mg


Fenofibrate (Tricor)  145 mg PO DAILY [facility]


   Last Admin: 11/27/17 09:17 Dose:  145 mg


Metoprolol Tartrate (Lopressor)  50 mg PO Q12 [facility]


   Last Admin: 11/27/17 21:29 Dose:  Not Given


Quetiapine Fumarate (Seroquel)  25 mg PO HS [facility]


   Last Admin: 11/27/17 21:29 Dose:  25 mg











- Labs


Labs: 


 





 10/28/17 05:30 





 10/28/17 05:30 





 











PT  11.2 SECONDS (9.4-12.0)   12/14/15  05:20    


 


INR  1.05  (0.89-1.20)   12/14/15  05:20    


 


APTT  36.2 SECONDS (23.1-32.0)  H  12/14/15  05:20    














- Constitutional


Appears: Well, Non-toxic, No Acute Distress





- Head Exam


Head Exam: ATRAUMATIC, NORMAL INSPECTION, NORMOCEPHALIC





- Eye Exam


Eye Exam: EOMI, Normal appearance, PERRL


Pupil Exam: NORMAL ACCOMODATION, PERRL





- ENT Exam


ENT Exam: Mucous Membranes Moist, Normal Exam





- Neck Exam


Neck Exam: Full ROM, Normal Inspection.  absent: Lymphadenopathy





- Respiratory Exam


Respiratory Exam: Clear to Ausculation Bilateral, NORMAL BREATHING PATTERN





- Cardiovascular Exam


Cardiovascular Exam: REGULAR RHYTHM, RRR, +S1, +S2.  absent: Murmur





- GI/Abdominal Exam


GI & Abdominal Exam: Soft, Normal Bowel Sounds.  absent: Tenderness





- Extremities Exam


Extremities Exam: Full ROM, Normal Capillary Refill, Normal Inspection.  absent

: Joint Swelling, Pedal Edema





- Back Exam


Back Exam: NORMAL INSPECTION





- Neurological Exam


Neurological Exam: Alert, Awake, CN II-XII Intact, Normal Gait, Oriented x3





- Psychiatric Exam


Psychiatric exam: Normal Affect, Normal Mood





- Skin


Skin Exam: Dry, Intact, Normal Color, Warm





Assessment and Plan


(1) DVT prophylaxis


Status: Chronic   





(2) Scrotal edema


Status: Chronic   





(3) CAD (coronary artery disease)


Status: Chronic   





(4) Smoking


Status: Chronic   





(5) Low back pain


Status: Chronic   





(6) Prediabetes


Status: Chronic   





(7) Neurocognitive disorder


Status: Chronic

## 2017-11-29 RX ADMIN — DIVALPROEX SODIUM SCH MG: 250 TABLET, DELAYED RELEASE ORAL at 16:01

## 2017-11-29 RX ADMIN — DIVALPROEX SODIUM SCH MG: 250 TABLET, DELAYED RELEASE ORAL at 08:42

## 2017-11-29 NOTE — CP.PCM.PN
Subjective





- Date & Time of Evaluation


Date of Evaluation: 11/29/17


Time of Evaluation: 08:08





- Subjective


Subjective: 





s/o no f/c, n/v/d. no other complaints.calm and cooperative. no abd pain, 

finished anbx


a/p cont plan, cont placement, cont all medical management





Objective





- Vital Signs/Intake and Output


Vital Signs (last 24 hours): 


 











Temp Pulse Resp BP Pulse Ox


 


 98.2 F   68   20   102/70   95 


 


 11/29/17 00:59  11/29/17 00:59  11/29/17 00:59  11/29/17 00:59  11/29/17 00:59











- Medications


Medications: 


 Current Medications





Atorvastatin Calcium (Lipitor)  20 mg PO HS [facility]


   Last Admin: 11/28/17 21:23 Dose:  20 mg


Divalproex Sodium (Depakote Dr(*Bid*))  500 mg PO BID [facility]


   Last Admin: 11/28/17 16:58 Dose:  500 mg


Enalapril Maleate (Vasotec)  10 mg PO DAILY [facility]


   Last Admin: 11/28/17 10:20 Dose:  10 mg


Fenofibrate (Tricor)  145 mg PO DAILY [facility]


   Last Admin: 11/28/17 10:19 Dose:  145 mg


Metoprolol Tartrate (Lopressor)  50 mg PO Q12 [facility]


   Last Admin: 11/28/17 21:21 Dose:  50 mg


Quetiapine Fumarate (Seroquel)  25 mg PO HS [facility]


   Last Admin: 11/28/17 21:23 Dose:  25 mg











- Labs


Labs: 


 





 10/28/17 05:30 





 10/28/17 05:30 





 











PT  11.2 SECONDS (9.4-12.0)   12/14/15  05:20    


 


INR  1.05  (0.89-1.20)   12/14/15  05:20    


 


APTT  36.2 SECONDS (23.1-32.0)  H  12/14/15  05:20    














- Constitutional


Appears: Well, Non-toxic, No Acute Distress





- Head Exam


Head Exam: ATRAUMATIC, NORMAL INSPECTION, NORMOCEPHALIC





- Eye Exam


Eye Exam: EOMI, Normal appearance, PERRL


Pupil Exam: NORMAL ACCOMODATION, PERRL





- ENT Exam


ENT Exam: Mucous Membranes Moist, Normal Exam





- Neck Exam


Neck Exam: Full ROM, Normal Inspection.  absent: Lymphadenopathy





- Respiratory Exam


Respiratory Exam: Clear to Ausculation Bilateral, NORMAL BREATHING PATTERN





- Cardiovascular Exam


Cardiovascular Exam: REGULAR RHYTHM, RRR, +S1, +S2.  absent: Murmur





- GI/Abdominal Exam


GI & Abdominal Exam: Soft, Normal Bowel Sounds.  absent: Tenderness





- Extremities Exam


Extremities Exam: Full ROM, Normal Capillary Refill, Normal Inspection.  absent

: Joint Swelling, Pedal Edema





- Back Exam


Back Exam: NORMAL INSPECTION





- Neurological Exam


Neurological Exam: Alert, Awake, CN II-XII Intact, Normal Gait, Oriented x3





- Psychiatric Exam


Psychiatric exam: Normal Affect, Normal Mood





- Skin


Skin Exam: Dry, Intact, Normal Color, Warm





Assessment and Plan


(1) DVT prophylaxis


Status: Chronic   





(2) Scrotal edema


Status: Chronic   





(3) CAD (coronary artery disease)


Status: Chronic   





(4) Smoking


Status: Chronic   





(5) Low back pain


Status: Chronic   





(6) Prediabetes


Status: Chronic   





(7) Neurocognitive disorder


Status: Chronic

## 2017-11-30 RX ADMIN — DIVALPROEX SODIUM SCH MG: 250 TABLET, DELAYED RELEASE ORAL at 09:44

## 2017-11-30 RX ADMIN — DIVALPROEX SODIUM SCH MG: 250 TABLET, DELAYED RELEASE ORAL at 17:21

## 2017-11-30 NOTE — CP.PCM.PN
Subjective





- Date & Time of Evaluation


Date of Evaluation: 11/30/17


Time of Evaluation: 08:43





- Subjective


Subjective: 





s/o no f/c, n/v/d. no other complaints.calm and cooperative. no abd pain, 

finished anbx


a/p cont plan, cont placement, cont all medical management





Objective





- Vital Signs/Intake and Output


Vital Signs (last 24 hours): 


 











Temp Pulse Resp BP Pulse Ox


 


 97.6 F   78   20   132/75   95 


 


 11/30/17 07:54  11/30/17 07:54  11/30/17 07:54  11/30/17 07:54  11/30/17 07:54











- Medications


Medications: 


 Current Medications





Atorvastatin Calcium (Lipitor)  20 mg PO HS [facility]


   Last Admin: 11/29/17 21:29 Dose:  20 mg


Divalproex Sodium (Depakote Dr(*Bid*))  500 mg PO BID [facility]


   Last Admin: 11/29/17 16:01 Dose:  500 mg


Enalapril Maleate (Vasotec)  10 mg PO DAILY [facility]


   Last Admin: 11/29/17 08:42 Dose:  10 mg


Fenofibrate (Tricor)  145 mg PO DAILY [facility]


   Last Admin: 11/29/17 08:43 Dose:  145 mg


Metoprolol Tartrate (Lopressor)  50 mg PO Q12 [facility]


   Last Admin: 11/29/17 21:29 Dose:  50 mg


Quetiapine Fumarate (Seroquel)  25 mg PO HS [facility]


   Last Admin: 11/29/17 21:30 Dose:  25 mg











- Labs


Labs: 


 





 10/28/17 05:30 





 10/28/17 05:30 





 











PT  11.2 SECONDS (9.4-12.0)   12/14/15  05:20    


 


INR  1.05  (0.89-1.20)   12/14/15  05:20    


 


APTT  36.2 SECONDS (23.1-32.0)  H  12/14/15  05:20    














- Constitutional


Appears: Well, Non-toxic, No Acute Distress





- Head Exam


Head Exam: ATRAUMATIC, NORMAL INSPECTION, NORMOCEPHALIC





- Eye Exam


Eye Exam: EOMI, Normal appearance, PERRL


Pupil Exam: NORMAL ACCOMODATION, PERRL





- ENT Exam


ENT Exam: Mucous Membranes Moist, Normal Exam





- Neck Exam


Neck Exam: Full ROM, Normal Inspection.  absent: Lymphadenopathy





- Respiratory Exam


Respiratory Exam: Clear to Ausculation Bilateral, NORMAL BREATHING PATTERN





- Cardiovascular Exam


Cardiovascular Exam: REGULAR RHYTHM, RRR, +S1, +S2.  absent: Murmur





- GI/Abdominal Exam


GI & Abdominal Exam: Soft, Normal Bowel Sounds.  absent: Tenderness





- Extremities Exam


Extremities Exam: Full ROM, Normal Capillary Refill, Normal Inspection.  absent

: Joint Swelling, Pedal Edema





- Back Exam


Back Exam: NORMAL INSPECTION





- Neurological Exam


Neurological Exam: Alert, Awake, CN II-XII Intact, Normal Gait, Oriented x3





- Psychiatric Exam


Psychiatric exam: Normal Affect, Normal Mood





- Skin


Skin Exam: Dry, Intact, Normal Color, Warm





Assessment and Plan


(1) DVT prophylaxis


Status: Chronic   





(2) Scrotal edema


Status: Chronic   





(3) CAD (coronary artery disease)


Status: Chronic   





(4) Smoking


Status: Chronic   





(5) Low back pain


Status: Chronic   





(6) Prediabetes


Status: Chronic   





(7) Neurocognitive disorder


Status: Chronic

## 2017-12-01 RX ADMIN — DIVALPROEX SODIUM SCH MG: 250 TABLET, DELAYED RELEASE ORAL at 08:46

## 2017-12-01 RX ADMIN — DIVALPROEX SODIUM SCH MG: 250 TABLET, DELAYED RELEASE ORAL at 16:45

## 2017-12-01 NOTE — CP.PCM.PN
Subjective





- Date & Time of Evaluation


Date of Evaluation: 12/01/17


Time of Evaluation: 10:21





- Subjective


Subjective: 





s/o no f/c, n/v/d. no other complaints.calm and cooperative. no abd pain, 

finished anbx


a/p cont plan, cont placement, cont all medical management





Objective





- Vital Signs/Intake and Output


Vital Signs (last 24 hours): 


 











Temp Pulse Resp BP Pulse Ox


 


 98.1 F   78   19   101/68   97 


 


 12/01/17 08:51  12/01/17 08:51  12/01/17 08:51  12/01/17 08:51  12/01/17 08:51











- Medications


Medications: 


 Current Medications





Atorvastatin Calcium (Lipitor)  20 mg PO HS [facility]


   Last Admin: 11/30/17 21:49 Dose:  20 mg


Divalproex Sodium (Depakote Dr(*Bid*))  500 mg PO BID [facility]


   Last Admin: 12/01/17 08:46 Dose:  500 mg


Enalapril Maleate (Vasotec)  10 mg PO DAILY [facility]


   Last Admin: 12/01/17 08:47 Dose:  10 mg


Fenofibrate (Tricor)  145 mg PO DAILY [facility]


   Last Admin: 12/01/17 08:47 Dose:  145 mg


Metoprolol Tartrate (Lopressor)  50 mg PO Q12 [facility]


   Last Admin: 12/01/17 08:47 Dose:  50 mg


Quetiapine Fumarate (Seroquel)  25 mg PO HS [facility]


   Last Admin: 11/30/17 21:49 Dose:  25 mg











- Labs


Labs: 


 





 10/28/17 05:30 





 10/28/17 05:30 





 











PT  11.2 SECONDS (9.4-12.0)   12/14/15  05:20    


 


INR  1.05  (0.89-1.20)   12/14/15  05:20    


 


APTT  36.2 SECONDS (23.1-32.0)  H  12/14/15  05:20    














- Constitutional


Appears: Well, Non-toxic, No Acute Distress





- Head Exam


Head Exam: ATRAUMATIC, NORMAL INSPECTION, NORMOCEPHALIC





- Eye Exam


Eye Exam: EOMI, Normal appearance, PERRL


Pupil Exam: NORMAL ACCOMODATION, PERRL





- ENT Exam


ENT Exam: Mucous Membranes Moist, Normal Exam





- Neck Exam


Neck Exam: Full ROM, Normal Inspection.  absent: Lymphadenopathy





- Respiratory Exam


Respiratory Exam: Clear to Ausculation Bilateral, NORMAL BREATHING PATTERN





- Cardiovascular Exam


Cardiovascular Exam: REGULAR RHYTHM, RRR, +S1, +S2.  absent: Murmur





- GI/Abdominal Exam


GI & Abdominal Exam: Soft, Normal Bowel Sounds.  absent: Tenderness





- Extremities Exam


Extremities Exam: Full ROM, Normal Capillary Refill, Normal Inspection.  absent

: Joint Swelling, Pedal Edema





- Back Exam


Back Exam: NORMAL INSPECTION





- Neurological Exam


Neurological Exam: Alert, Awake, CN II-XII Intact, Normal Gait, Oriented x3





- Psychiatric Exam


Psychiatric exam: Normal Affect, Normal Mood





- Skin


Skin Exam: Dry, Intact, Normal Color, Warm





Assessment and Plan


(1) DVT prophylaxis


Status: Chronic   





(2) Scrotal edema


Status: Chronic   





(3) CAD (coronary artery disease)


Status: Chronic   





(4) Smoking


Status: Chronic   





(5) Low back pain


Status: Chronic   





(6) Prediabetes


Status: Chronic   





(7) Neurocognitive disorder


Status: Chronic

## 2017-12-02 RX ADMIN — DIVALPROEX SODIUM SCH MG: 250 TABLET, DELAYED RELEASE ORAL at 09:54

## 2017-12-02 RX ADMIN — DIVALPROEX SODIUM SCH MG: 250 TABLET, DELAYED RELEASE ORAL at 16:18

## 2017-12-02 NOTE — CP.PCM.PN
Subjective





- Date & Time of Evaluation


Date of Evaluation: 12/02/17


Time of Evaluation: 10:31





- Subjective


Subjective: 





s/o no f/c, n/v/d. no other complaints.calm and cooperative. no abd pain, 

finished anbx


a/p cont plan, cont placement, cont all medical management





Objective





- Vital Signs/Intake and Output


Vital Signs (last 24 hours): 


 











Temp Pulse Resp BP Pulse Ox


 


 97.1 F L  62   20   109/72   98 


 


 12/02/17 08:23  12/02/17 09:54  12/02/17 08:23  12/02/17 09:54  12/02/17 08:23











- Medications


Medications: 


 Current Medications





Atorvastatin Calcium (Lipitor)  20 mg PO HS [facility]


   Last Admin: 12/01/17 21:33 Dose:  20 mg


Divalproex Sodium (Depakote Dr(*Bid*))  500 mg PO BID [facility]


   Last Admin: 12/02/17 09:54 Dose:  500 mg


Enalapril Maleate (Vasotec)  10 mg PO DAILY [facility]


   Last Admin: 12/02/17 09:55 Dose:  10 mg


Fenofibrate (Tricor)  145 mg PO DAILY [facility]


   Last Admin: 12/02/17 09:55 Dose:  145 mg


Metoprolol Tartrate (Lopressor)  50 mg PO Q12 [facility]


   Last Admin: 12/02/17 09:54 Dose:  50 mg


Quetiapine Fumarate (Seroquel)  25 mg PO HS [facility]


   Last Admin: 12/01/17 21:33 Dose:  25 mg











- Labs


Labs: 


 





 10/28/17 05:30 





 10/28/17 05:30 





 











PT  11.2 SECONDS (9.4-12.0)   12/14/15  05:20    


 


INR  1.05  (0.89-1.20)   12/14/15  05:20    


 


APTT  36.2 SECONDS (23.1-32.0)  H  12/14/15  05:20    














- Constitutional


Appears: Well, Non-toxic, No Acute Distress





- Head Exam


Head Exam: ATRAUMATIC, NORMAL INSPECTION, NORMOCEPHALIC





- Eye Exam


Eye Exam: EOMI, Normal appearance, PERRL


Pupil Exam: NORMAL ACCOMODATION, PERRL





- ENT Exam


ENT Exam: Mucous Membranes Moist, Normal Exam





- Neck Exam


Neck Exam: Full ROM, Normal Inspection.  absent: Lymphadenopathy





- Respiratory Exam


Respiratory Exam: Clear to Ausculation Bilateral, NORMAL BREATHING PATTERN





- Cardiovascular Exam


Cardiovascular Exam: REGULAR RHYTHM, RRR, +S1, +S2.  absent: Murmur





- GI/Abdominal Exam


GI & Abdominal Exam: Soft.  absent: Tenderness





- Extremities Exam


Extremities Exam: Full ROM, Normal Capillary Refill, Normal Inspection.  absent

: Joint Swelling, Pedal Edema





- Back Exam


Back Exam: NORMAL INSPECTION





- Neurological Exam


Neurological Exam: Alert, Awake, CN II-XII Intact, Normal Gait, Oriented x3





- Psychiatric Exam


Psychiatric exam: Normal Affect, Normal Mood





- Skin


Skin Exam: Dry, Intact, Normal Color, Warm





Assessment and Plan


(1) DVT prophylaxis


Status: Chronic   





(2) Scrotal edema


Status: Chronic   





(3) CAD (coronary artery disease)


Status: Chronic   





(4) Smoking


Status: Chronic   





(5) Low back pain


Status: Chronic   





(6) Prediabetes


Status: Chronic   





(7) Neurocognitive disorder


Status: Chronic

## 2017-12-03 RX ADMIN — DIVALPROEX SODIUM SCH MG: 250 TABLET, DELAYED RELEASE ORAL at 16:26

## 2017-12-03 RX ADMIN — DIVALPROEX SODIUM SCH MG: 250 TABLET, DELAYED RELEASE ORAL at 09:40

## 2017-12-03 NOTE — CP.PCM.PN
Subjective





- Date & Time of Evaluation


Date of Evaluation: 12/03/17


Time of Evaluation: 12:11





- Subjective


Subjective: 





s/o no f/c, n/v/d. no other complaints.calm and cooperative. no abd pain, 

finished anbx


a/p cont plan, cont placement, cont all medical management





Objective





- Vital Signs/Intake and Output


Vital Signs (last 24 hours): 


 











Temp Pulse Resp BP Pulse Ox


 


 97.3 F L  60   20   118/85   95 


 


 12/03/17 08:29  12/03/17 09:41  12/03/17 08:29  12/03/17 09:41  12/03/17 08:29











- Medications


Medications: 


 Current Medications





Atorvastatin Calcium (Lipitor)  20 mg PO HS [facility]


   Last Admin: 12/02/17 21:34 Dose:  20 mg


Divalproex Sodium (Depakote Dr(*Bid*))  500 mg PO BID [facility]


   Last Admin: 12/03/17 09:40 Dose:  500 mg


Enalapril Maleate (Vasotec)  10 mg PO DAILY [facility]


   Last Admin: 12/03/17 09:40 Dose:  10 mg


Fenofibrate (Tricor)  145 mg PO DAILY [facility]


   Last Admin: 12/03/17 09:40 Dose:  145 mg


Metoprolol Tartrate (Lopressor)  50 mg PO Q12 [facility]


   Last Admin: 12/03/17 09:41 Dose:  50 mg


Quetiapine Fumarate (Seroquel)  25 mg PO HS [facility]


   Last Admin: 12/02/17 21:34 Dose:  25 mg











- Labs


Labs: 


 





 10/28/17 05:30 





 10/28/17 05:30 





 











PT  11.2 SECONDS (9.4-12.0)   12/14/15  05:20    


 


INR  1.05  (0.89-1.20)   12/14/15  05:20    


 


APTT  36.2 SECONDS (23.1-32.0)  H  12/14/15  05:20    














- Constitutional


Appears: Well, Non-toxic, No Acute Distress





- Head Exam


Head Exam: ATRAUMATIC, NORMAL INSPECTION, NORMOCEPHALIC





- Eye Exam


Eye Exam: EOMI, Normal appearance, PERRL


Pupil Exam: NORMAL ACCOMODATION, PERRL





- ENT Exam


ENT Exam: Mucous Membranes Moist, Normal Exam





- Neck Exam


Neck Exam: Full ROM, Normal Inspection.  absent: Lymphadenopathy





- Respiratory Exam


Respiratory Exam: Clear to Ausculation Bilateral, NORMAL BREATHING PATTERN





- Cardiovascular Exam


Cardiovascular Exam: REGULAR RHYTHM, RRR, +S1, +S2.  absent: Murmur





- GI/Abdominal Exam


GI & Abdominal Exam: Soft, Normal Bowel Sounds.  absent: Tenderness





- Extremities Exam


Extremities Exam: Full ROM, Normal Capillary Refill, Normal Inspection.  absent

: Joint Swelling, Pedal Edema





- Back Exam


Back Exam: NORMAL INSPECTION





- Neurological Exam


Neurological Exam: Alert, Awake, CN II-XII Intact, Normal Gait, Oriented x3





- Psychiatric Exam


Psychiatric exam: Normal Affect, Normal Mood





- Skin


Skin Exam: Dry, Intact, Normal Color, Warm





Assessment and Plan


(1) DVT prophylaxis


Status: Chronic   





(2) Scrotal edema


Status: Chronic   





(3) CAD (coronary artery disease)


Status: Chronic   





(4) Smoking


Status: Chronic   





(5) Low back pain


Status: Chronic   





(6) Prediabetes


Status: Chronic   





(7) Neurocognitive disorder


Status: Chronic

## 2017-12-04 RX ADMIN — DIVALPROEX SODIUM SCH MG: 250 TABLET, DELAYED RELEASE ORAL at 10:15

## 2017-12-04 RX ADMIN — DIVALPROEX SODIUM SCH MG: 250 TABLET, DELAYED RELEASE ORAL at 16:42

## 2017-12-04 NOTE — CP.PCM.PN
Subjective





- Date & Time of Evaluation


Date of Evaluation: 12/04/17


Time of Evaluation: 08:13





- Subjective


Subjective: 





s/o no f/c, n/v/d. no other complaints.calm and cooperative. no abd pain, 

finished anbx


a/p cont plan, cont placement, cont all medical management





Objective





- Vital Signs/Intake and Output


Vital Signs (last 24 hours): 


 











Temp Pulse Resp BP Pulse Ox


 


 97.8 F   72   20   98/66 L  98 


 


 12/04/17 00:29  12/04/17 00:29  12/04/17 00:29  12/04/17 00:29  12/04/17 00:29











- Medications


Medications: 


 Current Medications





Atorvastatin Calcium (Lipitor)  20 mg PO HS [facility]


   Last Admin: 12/03/17 21:21 Dose:  20 mg


Divalproex Sodium (Depakote Dr(*Bid*))  500 mg PO BID [facility]


   Last Admin: 12/03/17 16:26 Dose:  500 mg


Enalapril Maleate (Vasotec)  10 mg PO DAILY [facility]


   Last Admin: 12/03/17 09:40 Dose:  10 mg


Fenofibrate (Tricor)  145 mg PO DAILY [facility]


   Last Admin: 12/03/17 09:40 Dose:  145 mg


Metoprolol Tartrate (Lopressor)  50 mg PO Q12 [facility]


   Last Admin: 12/03/17 21:20 Dose:  50 mg


Quetiapine Fumarate (Seroquel)  25 mg PO HS [facility]


   Last Admin: 12/03/17 21:21 Dose:  25 mg











- Labs


Labs: 


 





 10/28/17 05:30 





 10/28/17 05:30 





 











PT  11.2 SECONDS (9.4-12.0)   12/14/15  05:20    


 


INR  1.05  (0.89-1.20)   12/14/15  05:20    


 


APTT  36.2 SECONDS (23.1-32.0)  H  12/14/15  05:20    














- Constitutional


Appears: Well, Non-toxic, No Acute Distress





- Head Exam


Head Exam: ATRAUMATIC, NORMAL INSPECTION, NORMOCEPHALIC





- Eye Exam


Eye Exam: EOMI, Normal appearance, PERRL


Pupil Exam: NORMAL ACCOMODATION, PERRL





- ENT Exam


ENT Exam: Mucous Membranes Moist, Normal Exam





- Neck Exam


Neck Exam: Full ROM, Normal Inspection.  absent: Lymphadenopathy





- Respiratory Exam


Respiratory Exam: Clear to Ausculation Bilateral, NORMAL BREATHING PATTERN





- Cardiovascular Exam


Cardiovascular Exam: REGULAR RHYTHM, RRR, +S1, +S2.  absent: Murmur





- GI/Abdominal Exam


GI & Abdominal Exam: Soft, Normal Bowel Sounds.  absent: Tenderness





- Extremities Exam


Extremities Exam: Full ROM, Normal Capillary Refill, Normal Inspection.  absent

: Joint Swelling, Pedal Edema





- Back Exam


Back Exam: NORMAL INSPECTION





- Neurological Exam


Neurological Exam: Alert, Awake, CN II-XII Intact, Normal Gait, Oriented x3





- Psychiatric Exam


Psychiatric exam: Normal Affect, Normal Mood





- Skin


Skin Exam: Dry, Intact, Normal Color, Warm





Assessment and Plan


(1) DVT prophylaxis


Status: Chronic   





(2) Scrotal edema


Status: Chronic   





(3) CAD (coronary artery disease)


Status: Chronic   





(4) Smoking


Status: Chronic   





(5) Low back pain


Status: Chronic   





(6) Prediabetes


Status: Chronic   





(7) Neurocognitive disorder


Status: Chronic

## 2017-12-05 RX ADMIN — DIVALPROEX SODIUM SCH MG: 250 TABLET, DELAYED RELEASE ORAL at 16:02

## 2017-12-05 RX ADMIN — DIVALPROEX SODIUM SCH MG: 250 TABLET, DELAYED RELEASE ORAL at 08:55

## 2017-12-05 NOTE — CP.PCM.PN
Subjective





- Date & Time of Evaluation


Date of Evaluation: 12/05/17


Time of Evaluation: 08:36





- Subjective


Subjective: 





s/o no f/c, n/v/d. no other complaints.calm and cooperative. no abd pain, 

finished anbx


a/p cont plan, cont placement, cont all medical management





Objective





- Vital Signs/Intake and Output


Vital Signs (last 24 hours): 


 











Temp Pulse Resp BP Pulse Ox


 


 97.5 F L  68   19   102/70   98 


 


 12/05/17 08:17  12/05/17 08:17  12/05/17 08:17  12/05/17 08:17  12/05/17 08:17











- Medications


Medications: 


 Current Medications





Atorvastatin Calcium (Lipitor)  20 mg PO HS [facility]


   Last Admin: 12/04/17 22:20 Dose:  20 mg


Divalproex Sodium (Depakote Dr(*Bid*))  500 mg PO BID [facility]


   Last Admin: 12/04/17 16:42 Dose:  500 mg


Enalapril Maleate (Vasotec)  10 mg PO DAILY [facility]


   Last Admin: 12/04/17 10:17 Dose:  10 mg


Fenofibrate (Tricor)  145 mg PO DAILY [facility]


   Last Admin: 12/04/17 10:17 Dose:  145 mg


Metoprolol Tartrate (Lopressor)  50 mg PO Q12 [facility]


   Last Admin: 12/04/17 22:19 Dose:  Not Given


Quetiapine Fumarate (Seroquel)  25 mg PO HS [facility]


   Last Admin: 12/04/17 22:20 Dose:  25 mg











- Labs


Labs: 


 





 10/28/17 05:30 





 10/28/17 05:30 





 











PT  11.2 SECONDS (9.4-12.0)   12/14/15  05:20    


 


INR  1.05  (0.89-1.20)   12/14/15  05:20    


 


APTT  36.2 SECONDS (23.1-32.0)  H  12/14/15  05:20    














- Constitutional


Appears: Well, Non-toxic, No Acute Distress





- Head Exam


Head Exam: ATRAUMATIC, NORMAL INSPECTION, NORMOCEPHALIC





- Eye Exam


Eye Exam: EOMI, Normal appearance, PERRL


Pupil Exam: NORMAL ACCOMODATION, PERRL





- ENT Exam


ENT Exam: Mucous Membranes Moist, Normal Exam





- Neck Exam


Neck Exam: Full ROM, Normal Inspection.  absent: Lymphadenopathy





- Respiratory Exam


Respiratory Exam: Clear to Ausculation Bilateral, NORMAL BREATHING PATTERN





- Cardiovascular Exam


Cardiovascular Exam: REGULAR RHYTHM, RRR, +S1, +S2.  absent: Murmur





- GI/Abdominal Exam


GI & Abdominal Exam: Soft, Normal Bowel Sounds.  absent: Tenderness





- Extremities Exam


Extremities Exam: Full ROM, Normal Capillary Refill, Normal Inspection.  absent

: Joint Swelling, Pedal Edema





- Back Exam


Back Exam: NORMAL INSPECTION





- Neurological Exam


Neurological Exam: Alert, Awake, CN II-XII Intact, Normal Gait, Oriented x3





- Psychiatric Exam


Psychiatric exam: Normal Affect, Normal Mood





- Skin


Skin Exam: Dry, Intact, Normal Color, Warm





Assessment and Plan


(1) DVT prophylaxis


Status: Chronic   





(2) Scrotal edema


Status: Chronic   





(3) CAD (coronary artery disease)


Status: Chronic   





(4) Smoking


Status: Chronic   





(5) Low back pain


Status: Chronic   





(6) Prediabetes


Status: Chronic   





(7) Neurocognitive disorder


Status: Chronic

## 2017-12-06 RX ADMIN — DIVALPROEX SODIUM SCH MG: 250 TABLET, DELAYED RELEASE ORAL at 08:42

## 2017-12-06 RX ADMIN — DIVALPROEX SODIUM SCH MG: 250 TABLET, DELAYED RELEASE ORAL at 16:52

## 2017-12-06 NOTE — CP.PCM.PN
Subjective





- Date & Time of Evaluation


Date of Evaluation: 12/06/17


Time of Evaluation: 08:23





- Subjective


Subjective: 





s/o no f/c, n/v/d. no other complaints.calm and cooperative. no abd pain, 

finished anbx


a/p cont plan, cont placement, cont all medical management





Objective





- Vital Signs/Intake and Output


Vital Signs (last 24 hours): 


 











Temp Pulse Resp BP Pulse Ox


 


 97.6 F   66   20   105/74   97 


 


 12/06/17 08:16  12/06/17 08:16  12/06/17 08:16  12/06/17 08:16  12/06/17 08:16











- Medications


Medications: 


 Current Medications





Atorvastatin Calcium (Lipitor)  20 mg PO [facility]


   Last Admin: 12/05/17 21:27 Dose:  20 mg


Divalproex Sodium (Depakote Dr(*Bid*))  500 mg PO BID [facility]


   Last Admin: 12/05/17 16:02 Dose:  500 mg


Enalapril Maleate (Vasotec)  10 mg PO DAILY [facility]


   Last Admin: 12/05/17 08:58 Dose:  Not Given


Fenofibrate (Tricor)  145 mg PO DAILY [facility]


   Last Admin: 12/05/17 08:55 Dose:  145 mg


Metoprolol Tartrate (Lopressor)  50 mg PO Q12 [facility]


   Last Admin: 12/05/17 21:26 Dose:  50 mg


Quetiapine Fumarate (Seroquel)  25 mg PO HS [facility]


   Last Admin: 12/05/17 21:26 Dose:  25 mg











- Labs


Labs: 


 





 10/28/17 05:30 





 10/28/17 05:30 





 











PT  11.2 SECONDS (9.4-12.0)   12/14/15  05:20    


 


INR  1.05  (0.89-1.20)   12/14/15  05:20    


 


APTT  36.2 SECONDS (23.1-32.0)  H  12/14/15  05:20    














- Constitutional


Appears: Well, Non-toxic, No Acute Distress





- Head Exam


Head Exam: ATRAUMATIC, NORMAL INSPECTION, NORMOCEPHALIC





- Eye Exam


Eye Exam: EOMI, Normal appearance, PERRL


Pupil Exam: NORMAL ACCOMODATION, PERRL





- ENT Exam


ENT Exam: Mucous Membranes Moist, Normal Exam





- Neck Exam


Neck Exam: Full ROM, Normal Inspection.  absent: Lymphadenopathy





- Respiratory Exam


Respiratory Exam: Clear to Ausculation Bilateral, NORMAL BREATHING PATTERN





- Cardiovascular Exam


Cardiovascular Exam: REGULAR RHYTHM, RRR, +S1, +S2.  absent: Murmur





- GI/Abdominal Exam


GI & Abdominal Exam: Soft, Normal Bowel Sounds.  absent: Tenderness





- Rectal Exam


Rectal Exam: NORMAL INSPECTION





- Extremities Exam


Extremities Exam: Full ROM, Normal Capillary Refill, Normal Inspection.  absent

: Joint Swelling, Pedal Edema





- Back Exam


Back Exam: NORMAL INSPECTION





- Neurological Exam


Neurological Exam: Alert, Awake, CN II-XII Intact, Normal Gait, Oriented x3





- Psychiatric Exam


Psychiatric exam: Normal Affect, Normal Mood





- Skin


Skin Exam: Dry, Intact, Normal Color, Warm





Assessment and Plan


(1) DVT prophylaxis


Status: Chronic   





(2) Scrotal edema


Status: Chronic   





(3) CAD (coronary artery disease)


Status: Chronic   





(4) Smoking


Status: Chronic   





(5) Low back pain


Status: Chronic   





(6) Prediabetes


Status: Chronic   





(7) Neurocognitive disorder


Status: Chronic

## 2017-12-07 RX ADMIN — DIVALPROEX SODIUM SCH MG: 250 TABLET, DELAYED RELEASE ORAL at 08:45

## 2017-12-07 RX ADMIN — DIVALPROEX SODIUM SCH MG: 250 TABLET, DELAYED RELEASE ORAL at 16:31

## 2017-12-07 NOTE — CP.PCM.PN
Subjective





- Date & Time of Evaluation


Date of Evaluation: 12/07/17


Time of Evaluation: 08:19





- Subjective


Subjective: 





s/o no f/c, n/v/d. no other complaints.calm and cooperative. no abd pain, 

finished anbx


a/p cont plan, cont placement, cont all medical management





Objective





- Vital Signs/Intake and Output


Vital Signs (last 24 hours): 


 











Temp Pulse Resp BP Pulse Ox


 


 98.1 F   65   20   102/70   99 


 


 12/07/17 08:18  12/07/17 08:18  12/07/17 08:18  12/07/17 08:18  12/07/17 08:18











- Medications


Medications: 


 Current Medications





Atorvastatin Calcium (Lipitor)  20 mg PO HS [facility]


   Last Admin: 12/06/17 21:32 Dose:  20 mg


Divalproex Sodium (Depakote Dr(*Bid*))  500 mg PO BID [facility]


   Last Admin: 12/06/17 16:52 Dose:  500 mg


Enalapril Maleate (Vasotec)  10 mg PO DAILY [facility]


   Last Admin: 12/06/17 08:44 Dose:  10 mg


Fenofibrate (Tricor)  145 mg PO DAILY [facility]


   Last Admin: 12/06/17 08:43 Dose:  145 mg


Metoprolol Tartrate (Lopressor)  50 mg PO Q12 [facility]


   Last Admin: 12/06/17 21:33 Dose:  50 mg


Quetiapine Fumarate (Seroquel)  25 mg PO HS [facility]


   Last Admin: 12/06/17 21:33 Dose:  25 mg











- Labs


Labs: 


 





 10/28/17 05:30 





 10/28/17 05:30 





 











PT  11.2 SECONDS (9.4-12.0)   12/14/15  05:20    


 


INR  1.05  (0.89-1.20)   12/14/15  05:20    


 


APTT  36.2 SECONDS (23.1-32.0)  H  12/14/15  05:20    














- Constitutional


Appears: Well, Non-toxic, No Acute Distress





- Head Exam


Head Exam: ATRAUMATIC, NORMAL INSPECTION, NORMOCEPHALIC





- Eye Exam


Eye Exam: EOMI, Normal appearance, PERRL


Pupil Exam: NORMAL ACCOMODATION, PERRL





- ENT Exam


ENT Exam: Mucous Membranes Moist, Normal Exam





- Neck Exam


Neck Exam: Full ROM, Normal Inspection.  absent: Lymphadenopathy





- Respiratory Exam


Respiratory Exam: Clear to Ausculation Bilateral, NORMAL BREATHING PATTERN





- Cardiovascular Exam


Cardiovascular Exam: REGULAR RHYTHM, RRR, +S1, +S2.  absent: Murmur





- GI/Abdominal Exam


GI & Abdominal Exam: Soft, Normal Bowel Sounds.  absent: Tenderness





- Extremities Exam


Extremities Exam: Full ROM, Normal Capillary Refill, Normal Inspection.  absent

: Joint Swelling, Pedal Edema





- Back Exam


Back Exam: NORMAL INSPECTION





- Neurological Exam


Neurological Exam: Alert, Awake, CN II-XII Intact, Normal Gait, Oriented x3





- Psychiatric Exam


Psychiatric exam: Normal Affect, Normal Mood





- Skin


Skin Exam: Dry, Intact, Normal Color, Warm





Assessment and Plan


(1) DVT prophylaxis


Status: Chronic   





(2) Scrotal edema


Status: Chronic   





(3) CAD (coronary artery disease)


Status: Chronic   





(4) Smoking


Status: Chronic   





(5) Low back pain


Status: Chronic   





(6) Prediabetes


Status: Chronic   





(7) Neurocognitive disorder


Status: Chronic

## 2017-12-08 RX ADMIN — DIVALPROEX SODIUM SCH MG: 250 TABLET, DELAYED RELEASE ORAL at 17:22

## 2017-12-08 RX ADMIN — DIVALPROEX SODIUM SCH MG: 250 TABLET, DELAYED RELEASE ORAL at 09:49

## 2017-12-08 NOTE — CP.PCM.PN
Subjective





- Date & Time of Evaluation


Date of Evaluation: 12/08/17


Time of Evaluation: 09:29





- Subjective


Subjective: 





s/o no f/c, n/v/d. no other complaints.calm and cooperative. no abd pain, 

finished anbx


a/p cont plan, cont placement, cont all medical management





Objective





- Vital Signs/Intake and Output


Vital Signs (last 24 hours): 


 











Temp Pulse Resp BP Pulse Ox


 


 97.7 F   65   18   100/69   97 


 


 12/08/17 07:56  12/08/17 07:56  12/08/17 07:56  12/08/17 07:56  12/08/17 07:56











- Medications


Medications: 


 Current Medications





Atorvastatin Calcium (Lipitor)  20 mg PO [facility]


   Last Admin: 12/07/17 21:09 Dose:  20 mg


Divalproex Sodium (Depakote Dr(*Bid*))  [facility] mg [facility]ID [facility]


   Last Admin: 12/07/17 16:31 Dose:  500 mg


Enalapril Maleate (Vasotec)  10 mg PO [facility]ILY [facility]


   Last Admin: 12/07/17 08:45 Dose:  10 mg


Fenofibrate (Tricor)  145 mg PO [facility]ILY [facility]


   Last Admin: 12/07/17 08:45 Dose:  145 mg


Metoprolol Tartrate (Lopressor)  50 mg PO Q12 [facility]


   Last Admin: 12/07/17 21:09 Dose:  50 mg


Quetiapine Fumarate (Seroquel)  25 mg PO HS [facility]


   Last Admin: 12/07/17 21:09 Dose:  25 mg











- Labs


Labs: 


 





 10/28/17 05:30 





 10/28/17 05:30 





 











PT  11.2 SECONDS (9.4-12.0)   12/14/15  05:20    


 


INR  1.05  (0.89-1.20)   12/14/15  05:20    


 


APTT  36.2 SECONDS (23.1-32.0)  H  12/14/15  05:20    














- Constitutional


Appears: Well, Non-toxic, No Acute Distress





- Head Exam


Head Exam: ATRAUMATIC, NORMAL INSPECTION, NORMOCEPHALIC





- Eye Exam


Eye Exam: EOMI, Normal appearance, PERRL


Pupil Exam: NORMAL ACCOMODATION, PERRL





- ENT Exam


ENT Exam: Mucous Membranes Moist, Normal Exam





- Neck Exam


Neck Exam: Full ROM, Normal Inspection.  absent: Lymphadenopathy





- Respiratory Exam


Respiratory Exam: Clear to Ausculation Bilateral, NORMAL BREATHING PATTERN





- Cardiovascular Exam


Cardiovascular Exam: REGULAR RHYTHM, RRR, +S1, +S2.  absent: Murmur





- GI/Abdominal Exam


GI & Abdominal Exam: Soft, Normal Bowel Sounds.  absent: Tenderness





- Extremities Exam


Extremities Exam: Full ROM, Normal Capillary Refill, Normal Inspection.  absent

: Joint Swelling, Pedal Edema





- Back Exam


Back Exam: NORMAL INSPECTION





- Neurological Exam


Neurological Exam: Alert, Awake, CN II-XII Intact, Normal Gait, Oriented x3





- Psychiatric Exam


Psychiatric exam: Normal Affect, Normal Mood





- Skin


Skin Exam: Dry, Intact, Normal Color, Warm





Assessment and Plan


(1) DVT prophylaxis


Status: Chronic   





(2) Scrotal edema


Status: Chronic   





(3) CAD (coronary artery disease)


Status: Chronic   





(4) Smoking


Status: Chronic   





(5) Low back pain


Status: Chronic   





(6) Prediabetes


Status: Chronic   





(7) Neurocognitive disorder


Status: Chronic

## 2017-12-09 RX ADMIN — DIVALPROEX SODIUM SCH MG: 250 TABLET, DELAYED RELEASE ORAL at 08:40

## 2017-12-09 RX ADMIN — DIVALPROEX SODIUM SCH MG: 250 TABLET, DELAYED RELEASE ORAL at 16:05

## 2017-12-09 NOTE — CP.PCM.PN
Subjective





- Date & Time of Evaluation


Date of Evaluation: 12/09/17


Time of Evaluation: 08:00





- Subjective


Subjective: 





s/o no f/c, n/v/d. no other complaints.calm and cooperative. no abd pain, 

finished anbx


a/p cont plan, cont placement, cont all medical management





Objective





- Vital Signs/Intake and Output


Vital Signs (last 24 hours): 


 











Temp Pulse Resp BP Pulse Ox


 


 98.3 F   75   17   116/72   97 


 


 12/08/17 21:30  12/08/17 21:30  12/08/17 21:30  12/08/17 21:30  12/08/17 21:30











- Medications


Medications: 


 Current Medications





Atorvastatin Calcium (Lipitor)  20 mg PO HS [facility]


   Last Admin: 12/08/17 21:29 Dose:  20 mg


Divalproex Sodium (Depakote Dr(*Bid*))  500 mg PO BID [facility]


   Last Admin: 12/08/17 17:22 Dose:  500 mg


Enalapril Maleate (Vasotec)  10 mg PO DAILY [facility]


   Last Admin: 12/08/17 09:51 Dose:  10 mg


Fenofibrate (Tricor)  145 mg PO DAILY [facility]


   Last Admin: 12/08/17 09:51 Dose:  145 mg


Metoprolol Tartrate (Lopressor)  50 mg PO Q12 [facility]


   Last Admin: 12/08/17 21:30 Dose:  50 mg


Quetiapine Fumarate (Seroquel)  25 mg PO HS [facility]


   Last Admin: 12/08/17 21:29 Dose:  25 mg











- Labs


Labs: 


 





 10/28/17 05:30 





 10/28/17 05:30 





 











PT  11.2 SECONDS (9.4-12.0)   12/14/15  05:20    


 


INR  1.05  (0.89-1.20)   12/14/15  05:20    


 


APTT  36.2 SECONDS (23.1-32.0)  H  12/14/15  05:20    














- Constitutional


Appears: Well, Non-toxic, No Acute Distress





- Head Exam


Head Exam: ATRAUMATIC, NORMAL INSPECTION, NORMOCEPHALIC





- Eye Exam


Eye Exam: EOMI, Normal appearance, PERRL


Pupil Exam: NORMAL ACCOMODATION, PERRL





- ENT Exam


ENT Exam: Mucous Membranes Moist, Normal Exam





- Neck Exam


Neck Exam: Full ROM, Normal Inspection.  absent: Lymphadenopathy





- Respiratory Exam


Respiratory Exam: Clear to Ausculation Bilateral, NORMAL BREATHING PATTERN





- Cardiovascular Exam


Cardiovascular Exam: REGULAR RHYTHM, RRR, +S1, +S2.  absent: Murmur





- GI/Abdominal Exam


GI & Abdominal Exam: Soft, Normal Bowel Sounds.  absent: Tenderness





- Rectal Exam


Rectal Exam: NORMAL INSPECTION





- Extremities Exam


Extremities Exam: Full ROM, Normal Capillary Refill, Normal Inspection.  absent

: Joint Swelling, Pedal Edema





- Back Exam


Back Exam: NORMAL INSPECTION





- Neurological Exam


Neurological Exam: Alert, Awake, CN II-XII Intact, Normal Gait, Oriented x3





- Psychiatric Exam


Psychiatric exam: Normal Affect, Normal Mood





- Skin


Skin Exam: Dry, Intact, Normal Color, Warm





Assessment and Plan


(1) DVT prophylaxis


Status: Chronic   





(2) Scrotal edema


Status: Chronic   





(3) CAD (coronary artery disease)


Status: Chronic   





(4) Smoking


Status: Chronic   





(5) Low back pain


Status: Chronic   





(6) Prediabetes


Status: Chronic   





(7) Neurocognitive disorder


Status: Chronic

## 2017-12-10 RX ADMIN — DIVALPROEX SODIUM SCH MG: 250 TABLET, DELAYED RELEASE ORAL at 16:45

## 2017-12-10 RX ADMIN — DIVALPROEX SODIUM SCH MG: 250 TABLET, DELAYED RELEASE ORAL at 08:25

## 2017-12-10 NOTE — CP.PCM.PN
Subjective





- Date & Time of Evaluation


Date of Evaluation: 12/10/17


Time of Evaluation: 10:26





- Subjective


Subjective: 





s/o no f/c, n/v/d. no other complaints.calm and cooperative. no abd pain, 

finished anbx


a/p cont plan, cont placement, cont all medical management








Objective





- Vital Signs/Intake and Output


Vital Signs (last 24 hours): 


 











Temp Pulse Resp BP Pulse Ox


 


 98 F   68   20   112/75   98 


 


 12/10/17 08:29  12/10/17 08:29  12/10/17 08:29  12/10/17 08:29  12/10/17 08:29











- Medications


Medications: 


 Current Medications





Atorvastatin Calcium (Lipitor)  20 mg PO HS [facility]


   Last Admin: 12/09/17 21:37 Dose:  20 mg


Divalproex Sodium (Depakote Dr(*Bid*))  500 mg PO BID [facility]


   Last Admin: 12/10/17 08:25 Dose:  500 mg


Enalapril Maleate (Vasotec)  10 mg PO DAILY [facility]


   Last Admin: 12/10/17 08:28 Dose:  Not Given


Fenofibrate (Tricor)  145 mg PO DAILY [facility]


   Last Admin: 12/10/17 08:24 Dose:  145 mg


Metoprolol Tartrate (Lopressor)  50 mg PO Q12 [facility]


   Last Admin: 12/10/17 08:25 Dose:  50 mg


Quetiapine Fumarate (Seroquel)  25 mg PO HS [facility]


   Last Admin: 12/09/17 21:37 Dose:  25 mg











- Labs


Labs: 


 





 10/28/17 05:30 





 10/28/17 05:30 





 











PT  11.2 SECONDS (9.4-12.0)   12/14/15  05:20    


 


INR  1.05  (0.89-1.20)   12/14/15  05:20    


 


APTT  36.2 SECONDS (23.1-32.0)  H  12/14/15  05:20    














- Constitutional


Appears: Well, Non-toxic, No Acute Distress





- Head Exam


Head Exam: ATRAUMATIC, NORMAL INSPECTION, NORMOCEPHALIC





- Eye Exam


Eye Exam: EOMI, Normal appearance, PERRL


Pupil Exam: NORMAL ACCOMODATION, PERRL





- ENT Exam


ENT Exam: Mucous Membranes Moist, Normal Exam





- Neck Exam


Neck Exam: Full ROM, Normal Inspection.  absent: Lymphadenopathy





- Respiratory Exam


Respiratory Exam: Clear to Ausculation Bilateral, NORMAL BREATHING PATTERN





- Cardiovascular Exam


Cardiovascular Exam: REGULAR RHYTHM, RRR, +S1, +S2.  absent: Murmur





- GI/Abdominal Exam


GI & Abdominal Exam: Soft, Normal Bowel Sounds.  absent: Tenderness





- Extremities Exam


Extremities Exam: Full ROM, Normal Capillary Refill, Normal Inspection.  absent

: Joint Swelling, Pedal Edema





- Back Exam


Back Exam: NORMAL INSPECTION





- Neurological Exam


Neurological Exam: Alert, Awake, CN II-XII Intact, Normal Gait, Oriented x3





- Psychiatric Exam


Psychiatric exam: Normal Affect, Normal Mood





- Skin


Skin Exam: Dry, Intact, Normal Color, Warm





Assessment and Plan


(1) DVT prophylaxis


Status: Chronic   





(2) Scrotal edema


Status: Chronic   





(3) CAD (coronary artery disease)


Status: Chronic   





(4) Smoking


Status: Chronic   





(5) Low back pain


Status: Chronic   





(6) Prediabetes


Status: Chronic   





(7) Neurocognitive disorder


Status: Chronic

## 2017-12-11 RX ADMIN — DIVALPROEX SODIUM SCH MG: 250 TABLET, DELAYED RELEASE ORAL at 09:33

## 2017-12-11 RX ADMIN — DIVALPROEX SODIUM SCH MG: 250 TABLET, DELAYED RELEASE ORAL at 16:38

## 2017-12-11 NOTE — CP.PCM.PN
Subjective





- Date & Time of Evaluation


Date of Evaluation: 12/11/17


Time of Evaluation: 08:24





- Subjective


Subjective: 








s/o no f/c, n/v/d. no other complaints.calm and cooperative. no abd pain, 

finished anbx


a/p cont plan, cont placement, cont all medical management





Objective





- Vital Signs/Intake and Output


Vital Signs (last 24 hours): 


 











Temp Pulse Resp BP Pulse Ox


 


 97.3 F L  66   20   98/68 L  97 


 


 12/11/17 07:38  12/11/17 07:38  12/11/17 07:38  12/11/17 07:38  12/11/17 07:38











- Medications


Medications: 


 Current Medications





Atorvastatin Calcium (Lipitor)  20 mg PO HS [facility]


   Last Admin: 12/10/17 21:17 Dose:  20 mg


Divalproex Sodium (Depakote Dr(*Bid*))  500 mg PO BID [facility]


   Last Admin: 12/10/17 16:45 Dose:  500 mg


Enalapril Maleate (Vasotec)  10 mg PO DAILY [facility]


   Last Admin: 12/10/17 08:28 Dose:  Not Given


Fenofibrate (Tricor)  145 mg PO DAILY [facility]


   Last Admin: 12/10/17 08:24 Dose:  145 mg


Metoprolol Tartrate (Lopressor)  50 mg PO Q12 [facility]


   Last Admin: 12/10/17 21:17 Dose:  50 mg


Quetiapine Fumarate (Seroquel)  25 mg PO HS [facility]


   Last Admin: 12/10/17 21:17 Dose:  25 mg











- Labs


Labs: 


 





 10/28/17 05:30 





 10/28/17 05:30 





 











PT  11.2 SECONDS (9.4-12.0)   12/14/15  05:20    


 


INR  1.05  (0.89-1.20)   12/14/15  05:20    


 


APTT  36.2 SECONDS (23.1-32.0)  H  12/14/15  05:20    














- Constitutional


Appears: Well, Non-toxic, No Acute Distress





- Head Exam


Head Exam: ATRAUMATIC, NORMAL INSPECTION, NORMOCEPHALIC





- Eye Exam


Eye Exam: EOMI, Normal appearance, PERRL


Pupil Exam: NORMAL ACCOMODATION, PERRL





- ENT Exam


ENT Exam: Mucous Membranes Moist, Normal Exam





- Neck Exam


Neck Exam: Full ROM, Normal Inspection.  absent: Lymphadenopathy





- Respiratory Exam


Respiratory Exam: Clear to Ausculation Bilateral, NORMAL BREATHING PATTERN





- Cardiovascular Exam


Cardiovascular Exam: REGULAR RHYTHM, RRR, +S1, +S2.  absent: Murmur





- GI/Abdominal Exam


GI & Abdominal Exam: Soft, Normal Bowel Sounds.  absent: Tenderness





- Extremities Exam


Extremities Exam: Full ROM, Normal Capillary Refill, Normal Inspection.  absent

: Joint Swelling, Pedal Edema





- Back Exam


Back Exam: NORMAL INSPECTION





- Neurological Exam


Neurological Exam: Alert, Awake, CN II-XII Intact, Normal Gait, Oriented x3





- Psychiatric Exam


Psychiatric exam: Normal Affect, Normal Mood





- Skin


Skin Exam: Dry, Intact, Normal Color, Warm





Assessment and Plan


(1) DVT prophylaxis


Status: Chronic   





(2) Scrotal edema


Status: Chronic   





(3) CAD (coronary artery disease)


Status: Chronic   





(4) Smoking


Status: Chronic   





(5) Low back pain


Status: Chronic   





(6) Prediabetes


Status: Chronic   





(7) Neurocognitive disorder


Status: Chronic

## 2017-12-12 RX ADMIN — DIVALPROEX SODIUM SCH MG: 250 TABLET, DELAYED RELEASE ORAL at 08:53

## 2017-12-12 RX ADMIN — DIVALPROEX SODIUM SCH MG: 250 TABLET, DELAYED RELEASE ORAL at 16:11

## 2017-12-12 NOTE — CP.PCM.PN
Subjective





- Date & Time of Evaluation


Date of Evaluation: 12/12/17


Time of Evaluation: 08:23





- Subjective


Subjective: 





s/o no f/c, n/v/d. no other complaints.calm and cooperative. no abd pain, 

finished anbx


a/p cont plan, cont placement, cont all medical management





Objective





- Vital Signs/Intake and Output


Vital Signs (last 24 hours): 


 











Temp Pulse Resp BP Pulse Ox


 


 97.8 F   79   20   120/78   97 


 


 12/12/17 07:44  12/12/17 07:44  12/12/17 07:44  12/12/17 07:44  12/12/17 07:44











- Medications


Medications: 


 Current Medications





Atorvastatin Calcium (Lipitor)  20 mg PO HS [facility]


   Last Admin: 12/11/17 21:46 Dose:  20 mg


Divalproex Sodium (Depakote Dr(*Bid*))  500 mg PO BID [facility]


   Last Admin: 12/11/17 16:38 Dose:  500 mg


Enalapril Maleate (Vasotec)  10 mg PO DAILY [facility]


   Last Admin: 12/11/17 09:32 Dose:  Not Given


Fenofibrate (Tricor)  145 mg PO DAILY [facility]


   Last Admin: 12/11/17 09:33 Dose:  145 mg


Metoprolol Tartrate (Lopressor)  50 mg PO Q12 [facility]


   Last Admin: 12/11/17 21:44 Dose:  50 mg


Quetiapine Fumarate (Seroquel)  25 mg PO HS [facility]


   Last Admin: 12/11/17 21:46 Dose:  25 mg











- Labs


Labs: 


 





 10/28/17 05:30 





 10/28/17 05:30 





 











PT  11.2 SECONDS (9.4-12.0)   12/14/15  05:20    


 


INR  1.05  (0.89-1.20)   12/14/15  05:20    


 


APTT  36.2 SECONDS (23.1-32.0)  H  12/14/15  05:20    














- Constitutional


Appears: Well, Non-toxic, No Acute Distress





- Head Exam


Head Exam: ATRAUMATIC, NORMAL INSPECTION, NORMOCEPHALIC





- Eye Exam


Eye Exam: EOMI, Normal appearance, PERRL


Pupil Exam: NORMAL ACCOMODATION, PERRL





- ENT Exam


ENT Exam: Mucous Membranes Moist, Normal Exam





- Neck Exam


Neck Exam: Full ROM, Normal Inspection.  absent: Lymphadenopathy





- Respiratory Exam


Respiratory Exam: Clear to Ausculation Bilateral, NORMAL BREATHING PATTERN





- Cardiovascular Exam


Cardiovascular Exam: REGULAR RHYTHM, RRR, +S1, +S2.  absent: Murmur





- GI/Abdominal Exam


GI & Abdominal Exam: Soft, Normal Bowel Sounds.  absent: Tenderness





- Extremities Exam


Extremities Exam: Full ROM, Normal Capillary Refill, Normal Inspection.  absent

: Joint Swelling, Pedal Edema





- Back Exam


Back Exam: NORMAL INSPECTION





- Neurological Exam


Neurological Exam: Alert, Awake, CN II-XII Intact, Normal Gait, Oriented x3





- Psychiatric Exam


Psychiatric exam: Normal Affect, Normal Mood





- Skin


Skin Exam: Dry, Intact, Normal Color, Warm





Assessment and Plan


(1) DVT prophylaxis


Status: Chronic   





(2) Scrotal edema


Status: Chronic   





(3) CAD (coronary artery disease)


Status: Chronic   





(4) Smoking


Status: Chronic   





(5) Low back pain


Status: Chronic   





(6) Prediabetes


Status: Chronic   





(7) Neurocognitive disorder


Status: Chronic

## 2017-12-13 RX ADMIN — DIVALPROEX SODIUM SCH MG: 250 TABLET, DELAYED RELEASE ORAL at 08:44

## 2017-12-13 RX ADMIN — DIVALPROEX SODIUM SCH MG: 250 TABLET, DELAYED RELEASE ORAL at 17:30

## 2017-12-13 NOTE — CP.PCM.PN
Subjective





- Date & Time of Evaluation


Date of Evaluation: 12/13/17


Time of Evaluation: 08:10





- Subjective


Subjective: 





s/o no f/c, n/v/d. no other complaints.calm and cooperative. no abd pain, 

finished anbx


a/p cont plan, cont placement, cont all medical management








Objective





- Vital Signs/Intake and Output


Vital Signs (last 24 hours): 


 











Temp Pulse Resp BP Pulse Ox


 


 97.6 F   59 L  20   101/66   97 


 


 12/13/17 07:43  12/13/17 07:43  12/13/17 07:43  12/13/17 07:43  12/13/17 07:43











- Medications


Medications: 


 Current Medications





Atorvastatin Calcium (Lipitor)  20 mg PO HS [facility]


   Last Admin: 12/12/17 21:08 Dose:  20 mg


Divalproex Sodium (Depakote Dr(*Bid*))  500 mg PO BID [facility]


   Last Admin: 12/12/17 16:11 Dose:  500 mg


Enalapril Maleate (Vasotec)  10 mg PO DAILY [facility]


   Last Admin: 12/12/17 08:56 Dose:  10 mg


Fenofibrate (Tricor)  145 mg PO DAILY [facility]


   Last Admin: 12/12/17 08:54 Dose:  145 mg


Metoprolol Tartrate (Lopressor)  50 mg PO Q12 [facility]


   Last Admin: 12/12/17 21:05 Dose:  50 mg


Quetiapine Fumarate (Seroquel)  25 mg PO HS [facility]


   Last Admin: 12/12/17 21:08 Dose:  25 mg











- Labs


Labs: 


 





 10/28/17 05:30 





 10/28/17 05:30 





 











PT  11.2 SECONDS (9.4-12.0)   12/14/15  05:20    


 


INR  1.05  (0.89-1.20)   12/14/15  05:20    


 


APTT  36.2 SECONDS (23.1-32.0)  H  12/14/15  05:20    














- Constitutional


Appears: Well, Non-toxic, No Acute Distress





- Head Exam


Head Exam: ATRAUMATIC, NORMAL INSPECTION, NORMOCEPHALIC





- Eye Exam


Eye Exam: EOMI, Normal appearance, PERRL


Pupil Exam: NORMAL ACCOMODATION, PERRL





- ENT Exam


ENT Exam: Mucous Membranes Moist, Normal Exam





- Neck Exam


Neck Exam: Full ROM, Normal Inspection.  absent: Lymphadenopathy





- Respiratory Exam


Respiratory Exam: Clear to Ausculation Bilateral, NORMAL BREATHING PATTERN





- Cardiovascular Exam


Cardiovascular Exam: REGULAR RHYTHM, RRR, +S1, +S2.  absent: Murmur





- GI/Abdominal Exam


GI & Abdominal Exam: Soft, Normal Bowel Sounds.  absent: Tenderness





- Extremities Exam


Extremities Exam: Full ROM, Normal Capillary Refill, Normal Inspection.  absent

: Joint Swelling, Pedal Edema





- Back Exam


Back Exam: NORMAL INSPECTION





- Neurological Exam


Neurological Exam: Alert, Awake, CN II-XII Intact, Normal Gait, Oriented x3





- Psychiatric Exam


Psychiatric exam: Normal Affect, Normal Mood





- Skin


Skin Exam: Dry, Intact, Normal Color, Warm





Assessment and Plan


(1) DVT prophylaxis


Status: Chronic   





(2) Scrotal edema


Status: Chronic   





(3) CAD (coronary artery disease)


Status: Chronic   





(4) Smoking


Status: Chronic   





(5) Low back pain


Status: Chronic   





(6) Prediabetes


Status: Chronic   





(7) Neurocognitive disorder


Status: Chronic

## 2017-12-14 RX ADMIN — DIVALPROEX SODIUM SCH MG: 250 TABLET, DELAYED RELEASE ORAL at 16:20

## 2017-12-14 RX ADMIN — DIVALPROEX SODIUM SCH MG: 250 TABLET, DELAYED RELEASE ORAL at 08:28

## 2017-12-14 NOTE — CP.PCM.PN
Subjective





- Date & Time of Evaluation


Date of Evaluation: 12/14/17


Time of Evaluation: 06:57





- Subjective


Subjective: 





s/o no f/c, n/v/d. no other complaints.calm and cooperative. no abd pain, 

finished anbx


a/p cont plan, cont placement, cont all medical management








Objective





- Vital Signs/Intake and Output


Vital Signs (last 24 hours): 


 











Temp Pulse Resp BP Pulse Ox


 


 98.2 F   72   19   92/61 L  97 


 


 12/14/17 00:37  12/14/17 00:37  12/14/17 00:37  12/14/17 00:37  12/14/17 00:37











- Medications


Medications: 


 Current Medications





Atorvastatin Calcium (Lipitor)  20 mg PO HS [facility]


   Last Admin: 12/13/17 21:21 Dose:  20 mg


Divalproex Sodium (Depakote Dr(*Bid*))  500 mg PO BID [facility]


   Last Admin: 12/13/17 17:30 Dose:  500 mg


Enalapril Maleate (Vasotec)  10 mg PO DAILY [facility]


   Last Admin: 12/13/17 08:45 Dose:  10 mg


Fenofibrate (Tricor)  145 mg PO DAILY [facility]


   Last Admin: 12/13/17 08:44 Dose:  145 mg


Metoprolol Tartrate (Lopressor)  50 mg PO Q12 [facility]


   Last Admin: 12/13/17 21:21 Dose:  50 mg


Quetiapine Fumarate (Seroquel)  25 mg PO HS [facility]


   Last Admin: 12/13/17 21:21 Dose:  25 mg











- Labs


Labs: 


 





 10/28/17 05:30 





 10/28/17 05:30 





 











PT  11.2 SECONDS (9.4-12.0)   12/14/15  05:20    


 


INR  1.05  (0.89-1.20)   12/14/15  05:20    


 


APTT  36.2 SECONDS (23.1-32.0)  H  12/14/15  05:20    














- Constitutional


Appears: Well, Non-toxic, No Acute Distress





- Head Exam


Head Exam: ATRAUMATIC, NORMAL INSPECTION, NORMOCEPHALIC





- Eye Exam


Eye Exam: EOMI, Normal appearance, PERRL


Pupil Exam: NORMAL ACCOMODATION, PERRL





- ENT Exam


ENT Exam: Mucous Membranes Moist, Normal Exam





- Neck Exam


Neck Exam: Full ROM, Normal Inspection.  absent: Lymphadenopathy





- Respiratory Exam


Respiratory Exam: Clear to Ausculation Bilateral, NORMAL BREATHING PATTERN





- Cardiovascular Exam


Cardiovascular Exam: REGULAR RHYTHM, RRR, +S1, +S2.  absent: Murmur





- GI/Abdominal Exam


GI & Abdominal Exam: Soft, Normal Bowel Sounds.  absent: Tenderness





- Extremities Exam


Extremities Exam: Full ROM, Normal Capillary Refill, Normal Inspection.  absent

: Joint Swelling, Pedal Edema





- Back Exam


Back Exam: NORMAL INSPECTION





- Neurological Exam


Neurological Exam: Alert, Awake, CN II-XII Intact, Normal Gait, Oriented x3





- Psychiatric Exam


Psychiatric exam: Normal Affect, Normal Mood





- Skin


Skin Exam: Dry, Intact, Normal Color, Warm





Assessment and Plan


(1) DVT prophylaxis


Status: Chronic   





(2) Scrotal edema


Status: Chronic   





(3) CAD (coronary artery disease)


Status: Chronic   





(4) Smoking


Status: Chronic   





(5) Low back pain


Status: Chronic   





(6) Prediabetes


Status: Chronic   





(7) Neurocognitive disorder


Status: Chronic

## 2017-12-15 RX ADMIN — DIVALPROEX SODIUM SCH MG: 250 TABLET, DELAYED RELEASE ORAL at 16:27

## 2017-12-15 RX ADMIN — DIVALPROEX SODIUM SCH MG: 250 TABLET, DELAYED RELEASE ORAL at 08:35

## 2017-12-15 NOTE — CP.PCM.PN
Subjective





- Date & Time of Evaluation


Date of Evaluation: 12/15/17


Time of Evaluation: 19:37





- Subjective


Subjective: 





s/o no f/c, n/v/d. no other complaints.calm and cooperative. no abd pain, 

finished anbx


a/p cont plan, cont placement, cont all medical management





Objective





- Vital Signs/Intake and Output


Vital Signs (last 24 hours): 


 











Temp Pulse Resp BP Pulse Ox


 


 97.9 F   66   20   105/69   97 


 


 12/15/17 15:55  12/15/17 15:55  12/15/17 15:55  12/15/17 15:55  12/15/17 15:55











- Medications


Medications: 


 Current Medications





Atorvastatin Calcium (Lipitor)  20 mg PO HS [facility]


   Last Admin: 12/14/17 21:44 Dose:  20 mg


Divalproex Sodium (Depakote Dr(*Bid*))  500 mg PO BID [facility]


   Last Admin: 12/15/17 16:27 Dose:  500 mg


Enalapril Maleate (Vasotec)  10 mg PO DAILY [facility]


   Last Admin: 12/15/17 08:36 Dose:  10 mg


Fenofibrate (Tricor)  145 mg PO DAILY [facility]


   Last Admin: 12/15/17 08:35 Dose:  145 mg


Metoprolol Tartrate (Lopressor)  50 mg PO Q12 [facility]


   Last Admin: 12/15/17 08:35 Dose:  50 mg


Quetiapine Fumarate (Seroquel)  25 mg PO HS [facility]


   Last Admin: 12/14/17 21:44 Dose:  25 mg











- Labs


Labs: 


 





 10/28/17 05:30 





 10/28/17 05:30 





 











PT  11.2 SECONDS (9.4-12.0)   12/14/15  05:20    


 


INR  1.05  (0.89-1.20)   12/14/15  05:20    


 


APTT  36.2 SECONDS (23.1-32.0)  H  12/14/15  05:20    














- Constitutional


Appears: Well, Non-toxic, No Acute Distress





- Head Exam


Head Exam: ATRAUMATIC, NORMAL INSPECTION, NORMOCEPHALIC





- Eye Exam


Eye Exam: EOMI, Normal appearance, PERRL


Pupil Exam: NORMAL ACCOMODATION, PERRL





- ENT Exam


ENT Exam: Mucous Membranes Moist, Normal Exam





- Neck Exam


Neck Exam: Full ROM, Normal Inspection.  absent: Lymphadenopathy





- Respiratory Exam


Respiratory Exam: Clear to Ausculation Bilateral, NORMAL BREATHING PATTERN





- Cardiovascular Exam


Cardiovascular Exam: REGULAR RHYTHM, RRR, +S1, +S2.  absent: Murmur





- GI/Abdominal Exam


GI & Abdominal Exam: Soft, Normal Bowel Sounds.  absent: Tenderness





- Extremities Exam


Extremities Exam: Full ROM, Normal Capillary Refill, Normal Inspection.  absent

: Joint Swelling, Pedal Edema





- Back Exam


Back Exam: NORMAL INSPECTION





- Neurological Exam


Neurological Exam: Alert, Awake, CN II-XII Intact, Normal Gait, Oriented x3





- Psychiatric Exam


Psychiatric exam: Normal Affect, Normal Mood





- Skin


Skin Exam: Dry, Intact, Normal Color, Warm





Assessment and Plan


(1) DVT prophylaxis


Status: Chronic   





(2) Scrotal edema


Status: Chronic   





(3) CAD (coronary artery disease)


Status: Chronic   





(4) Smoking


Status: Chronic   





(5) Low back pain


Status: Chronic   





(6) Prediabetes


Status: Chronic   





(7) Neurocognitive disorder


Status: Chronic

## 2017-12-16 RX ADMIN — DIVALPROEX SODIUM SCH MG: 250 TABLET, DELAYED RELEASE ORAL at 17:50

## 2017-12-16 RX ADMIN — DIVALPROEX SODIUM SCH MG: 250 TABLET, DELAYED RELEASE ORAL at 09:06

## 2017-12-16 NOTE — CP.PCM.CON
History of Present Illness





- History of Present Illness


History of Present Illness: 





called to see pt on 6s due to change in behavior. reportedly pt became agitated

, yelling, requiring security/not responding to verbal redirection requiring 

the application of 4 point restraints and prn medications. pt reportedly was 

hoarding in room, including foul smelling foods. staff reportedly attempted to 

remove some the said materials and pt became agitated. pt has been a long term 

pt at [facility] due to placement. pt has decreased support in terms of family

, self care and cognition. 





Review of Systems





- Psychiatric


Psychiatric: Behavioral Changes, Irritability





Past Patient History





- Past Medical History & Family History


Past Medical History?: Yes





- Past Social History


Smoking Status: Heavy Smoker > 10 Cigarettes Daily





- CARDIAC


Hx Cardiac Disorders: Yes





- NEUROLOGICAL


Hx Neurological Disorder: Yes (seizure)


Hx Seizures: Yes





- HEENT


Hx HEENT Problems: No





- RENAL


Hx Chronic Kidney Disease: No





- ENDOCRINE/METABOLIC


Hx Endocrine Disorders: No





- HEMATOLOGICAL/ONCOLOGICAL


Hx Blood Disorders: No





- INTEGUMENTARY


Hx Dermatological Problems: No





- MUSCULOSKELETAL/RHEUMATOLOGICAL


Hx Falls: No





- GENITOURINARY/GYNECOLOGICAL


Hx Genitourinary Disorders: No





- PSYCHIATRIC


Hx Substance Use: No





Meds


Home Medications: 


 Home Medication List











 Medication  Instructions  Recorded  Confirmed  Type


 


Aspirin [Ecotrin] 81 mg PO DAILY #0 tabec 03/21/16  Rx


 


Divalproex [Depakote DR(*BID*)] 500 mg PO BID #0 tcp 03/21/16  Rx


 


Enalapril Maleate [Vasotec] 10 mg PO DAILY #0 tab 03/21/16  Rx


 


Famotidine [Pepcid] 20 mg PO BID #0 tab 03/21/16  Rx


 


Ibuprofen [Motrin Tab] 600 mg PO Q8 PRN #0 tab 03/21/16  Rx


 


Metoprolol Tartrate [Lopressor] 50 mg PO Q12 #0 tab 03/21/16  Rx


 


Nicotine 21 mg/24 hr [Nicoderm Cq] 1 patch TD DAILY #0 patch 03/21/16  Rx


 


QUEtiapine [Seroquel] 25 mg PO DAILY@1700 #0 tab 03/21/16  Rx


 


levETIRAcetam [Keppra] 500 mg PO BID #0 tab 03/21/16  Rx











Allergies/Adverse Reactions: 


 Allergies











Allergy/AdvReac Type Severity Reaction Status Date / Time


 


No Known Allergies Allergy   Verified 06/02/16 00:33














- Medications


Medications: 


 Current Medications





Atorvastatin Calcium (Lipitor)  20 mg PO [facility]


   Last Admin: 12/15/17 21:10 Dose:  20 mg


Divalproex Sodium (Depakote Dr(*Bid*))  500 mg PO BID [facility]


   Last Admin: 12/16/17 09:06 Dose:  500 mg


Enalapril Maleate (Vasotec)  10 mg PO DAILY [facility]


   Last Admin: 12/16/17 09:07 Dose:  10 mg


Fenofibrate (Tricor)  145 mg PO DAILY [facility]


   Last Admin: 12/16/17 09:07 Dose:  145 mg


Metoprolol Tartrate (Lopressor)  50 mg PO Q12 [facility]


   Last Admin: 12/16/17 09:07 Dose:  50 mg


Quetiapine Fumarate (Seroquel)  25 mg PO HS [facility]


   Last Admin: 12/15/17 21:10 Dose:  25 mg











Physical Exam





- Constitutional


Appears: Older Than Stated Age





- Psychiatric Exam


Psychiatric exam: Agitated





Results





- Vital Signs


Recent Vital Signs: 


 Last Vital Signs











Temp  97.6 F   12/16/17 08:00


 


Pulse  102 H  12/16/17 08:00


 


Resp  20   12/16/17 08:00


 


BP  107/69   12/16/17 08:00


 


Pulse Ox  92 L  12/16/17 08:00














- Labs


Result Diagrams: 


 10/28/17 05:30





 10/28/17 05:30





- Impressions


Impression: 





per chart





Assessment & Plan


(1) Agitation


Status: Acute   





(2) Cognitive and behavioral changes


Status: Acute   





- Assessment and Plan (Free Text)


Assessment: 





pt to be evaluated by staff on an on going basis, with appropriate protocol for 

application of 4 point restraints with gradual withdrawal/removal restraints 

per clincial status 


psychiatry to be re consulted per status  


may want to re evaluate vpa level

## 2017-12-16 NOTE — CP.PCM.PN
Subjective





- Date & Time of Evaluation


Date of Evaluation: 12/16/17


Time of Evaluation: 14:51





- Subjective


Subjective: 





s/o no f/c, n/v/d. no other complaints.calm and cooperative. no abd pain, 

finished anbx. pt became agitated and aggressive. required 1:1. restraints, 

ativan. will monitor. adjust meds prn


a/p cont plan, cont placement, cont all medical management





Objective





- Vital Signs/Intake and Output


Vital Signs (last 24 hours): 


 











Temp Pulse Resp BP Pulse Ox


 


 97.6 F   102 H  20   107/69   92 L


 


 12/16/17 08:00  12/16/17 08:00  12/16/17 08:00  12/16/17 08:00  12/16/17 08:00











- Medications


Medications: 


 Current Medications





Atorvastatin Calcium (Lipitor)  20 mg PO [facility]


   Last Admin: 12/15/17 21:10 Dose:  20 mg


Divalproex Sodium (Depakote Dr(*Bid*))  500 mg PO BID [facility]


   Last Admin: 12/16/17 09:06 Dose:  500 mg


Enalapril Maleate (Vasotec)  10 mg PO DAILY [facility]


   Last Admin: 12/16/17 09:07 Dose:  10 mg


Fenofibrate (Tricor)  145 mg PO DAILY [facility]


   Last Admin: 12/16/17 09:07 Dose:  145 mg


Metoprolol Tartrate (Lopressor)  50 mg PO Q12 [facility]


   Last Admin: 12/16/17 09:07 Dose:  50 mg


Quetiapine Fumarate (Seroquel)  25 mg PO HS [facility]


   Last Admin: 12/15/17 21:10 Dose:  25 mg











- Labs


Labs: 


 





 10/28/17 05:30 





 10/28/17 05:30 





 











PT  11.2 SECONDS (9.4-12.0)   12/14/15  05:20    


 


INR  1.05  (0.89-1.20)   12/14/15  05:20    


 


APTT  36.2 SECONDS (23.1-32.0)  H  12/14/15  05:20    














- Constitutional


Appears: Well, Non-toxic, No Acute Distress





- Head Exam


Head Exam: ATRAUMATIC, NORMAL INSPECTION, NORMOCEPHALIC





- Eye Exam


Eye Exam: EOMI, Normal appearance, PERRL


Pupil Exam: NORMAL ACCOMODATION, PERRL





- ENT Exam


ENT Exam: Mucous Membranes Moist, Normal Exam





- Neck Exam


Neck Exam: Full ROM, Normal Inspection.  absent: Lymphadenopathy





- Respiratory Exam


Respiratory Exam: Clear to Ausculation Bilateral, NORMAL BREATHING PATTERN





- Cardiovascular Exam


Cardiovascular Exam: REGULAR RHYTHM, RRR, +S1, +S2.  absent: Murmur





- GI/Abdominal Exam


GI & Abdominal Exam: Soft, Normal Bowel Sounds.  absent: Tenderness





- Extremities Exam


Extremities Exam: Full ROM, Normal Capillary Refill, Normal Inspection.  absent

: Joint Swelling, Pedal Edema





- Back Exam


Back Exam: NORMAL INSPECTION





- Neurological Exam


Neurological Exam: Alert, Awake, CN II-XII Intact, Normal Gait, Oriented x3





- Psychiatric Exam


Psychiatric exam: Normal Affect, Normal Mood





- Skin


Skin Exam: Dry, Intact, Normal Color, Warm





Assessment and Plan


(1) DVT prophylaxis


Status: Chronic   





(2) Scrotal edema


Status: Chronic   





(3) CAD (coronary artery disease)


Status: Chronic   





(4) Smoking


Status: Chronic   





(5) Low back pain


Status: Chronic   





(6) Prediabetes


Status: Chronic   





(7) Neurocognitive disorder


Status: Chronic

## 2017-12-17 RX ADMIN — DIVALPROEX SODIUM SCH MG: 250 TABLET, DELAYED RELEASE ORAL at 08:39

## 2017-12-17 RX ADMIN — DIVALPROEX SODIUM SCH MG: 250 TABLET, DELAYED RELEASE ORAL at 16:15

## 2017-12-17 RX ADMIN — DIVALPROEX SODIUM SCH: 250 TABLET, DELAYED RELEASE ORAL at 16:16

## 2017-12-17 NOTE — CP.PCM.PN
Subjective





- Date & Time of Evaluation


Date of Evaluation: 12/17/17


Time of Evaluation: 10:03





- Subjective


Subjective: 





s/o no f/c, n/v/d. no other complaints.calm and cooperative. no abd pain, 

finished anbx. pt became agitated and aggressive. required 1:1. restraints, 

ativan. will monitor. adjust meds prn, psych consult appriciated, incr seroqule 

to bid


a/p cont plan, cont placement, cont all medical management








Objective





- Vital Signs/Intake and Output


Vital Signs (last 24 hours): 


 











Temp Pulse Resp BP Pulse Ox


 


 97.8 F   77   20   92/67 L  96 


 


 12/17/17 08:28  12/17/17 08:39  12/17/17 08:28  12/17/17 08:28  12/17/17 08:28











- Medications


Medications: 


 Current Medications





Atorvastatin Calcium (Lipitor)  20 mg PO HS [facility]


   Last Admin: 12/16/17 21:12 Dose:  20 mg


Divalproex Sodium (Depakote Dr(*Bid*))  500 mg PO BID [facility]


   Last Admin: 12/17/17 08:39 Dose:  500 mg


Enalapril Maleate (Vasotec)  10 mg PO DAILY [facility]


   Last Admin: 12/17/17 08:39 Dose:  10 mg


Fenofibrate (Tricor)  145 mg PO DAILY [facility]


   Last Admin: 12/17/17 08:39 Dose:  145 mg


Metoprolol Tartrate (Lopressor)  50 mg PO Q12 [facility]


   Last Admin: 12/17/17 08:39 Dose:  50 mg


Quetiapine Fumarate (Seroquel)  25 mg PO ONCE ONE


   Stop: 12/17/17 10:02


Quetiapine Fumarate (Seroquel)  25 mg PO Q12 [facility]











- Labs


Labs: 


 





 10/28/17 05:30 





 10/28/17 05:30 





 











PT  11.2 SECONDS (9.4-12.0)   12/14/15  05:20    


 


INR  1.05  (0.89-1.20)   12/14/15  05:20    


 


APTT  36.2 SECONDS (23.1-32.0)  H  12/14/15  05:20    














- Constitutional


Appears: Well, Non-toxic, No Acute Distress





- Head Exam


Head Exam: ATRAUMATIC, NORMAL INSPECTION, NORMOCEPHALIC





- Eye Exam


Eye Exam: EOMI, Normal appearance, PERRL


Pupil Exam: NORMAL ACCOMODATION, PERRL





- ENT Exam


ENT Exam: Mucous Membranes Moist, Normal Exam





- Neck Exam


Neck Exam: Full ROM, Normal Inspection.  absent: Lymphadenopathy





- Respiratory Exam


Respiratory Exam: Clear to Ausculation Bilateral, NORMAL BREATHING PATTERN





- Cardiovascular Exam


Cardiovascular Exam: REGULAR RHYTHM, RRR, +S1, +S2.  absent: Murmur





- GI/Abdominal Exam


GI & Abdominal Exam: Soft, Normal Bowel Sounds.  absent: Tenderness





- Extremities Exam


Extremities Exam: Full ROM, Normal Capillary Refill, Normal Inspection.  absent

: Joint Swelling, Pedal Edema





- Back Exam


Back Exam: NORMAL INSPECTION





- Neurological Exam


Neurological Exam: Alert, Awake, CN II-XII Intact, Normal Gait, Oriented x3





- Psychiatric Exam


Psychiatric exam: Normal Affect, Normal Mood





- Skin


Skin Exam: Dry, Intact, Normal Color, Warm





Assessment and Plan


(1) DVT prophylaxis


Status: Chronic   





(2) Scrotal edema


Status: Chronic   





(3) CAD (coronary artery disease)


Status: Chronic   





(4) Smoking


Status: Chronic   





(5) Low back pain


Status: Chronic   





(6) Prediabetes


Status: Chronic   





(7) Neurocognitive disorder


Status: Chronic

## 2017-12-18 RX ADMIN — DIVALPROEX SODIUM SCH MG: 250 TABLET, DELAYED RELEASE ORAL at 16:44

## 2017-12-18 RX ADMIN — DIVALPROEX SODIUM SCH MG: 250 TABLET, DELAYED RELEASE ORAL at 08:52

## 2017-12-18 NOTE — CP.PCM.PN
Subjective





- Date & Time of Evaluation


Date of Evaluation: 12/18/17


Time of Evaluation: 16:24





- Subjective


Subjective: 





s/o no f/c, n/v/d. no other complaints.calm and cooperative. no abd pain, 

finished anbx. pt became agitated and aggressive. required 1:1. restraints, 

ativan. will monitor. adjust meds prn, psych consult appriciated, incr seroqule 

to bid


a/p cont plan, cont placement, cont all medical management





Objective





- Vital Signs/Intake and Output


Vital Signs (last 24 hours): 


 











Temp Pulse Resp BP Pulse Ox


 


 98.1 F   88   20   124/75   96 


 


 12/18/17 07:43  12/18/17 08:52  12/18/17 07:43  12/18/17 08:52  12/18/17 07:43











- Medications


Medications: 


 Current Medications





Atorvastatin Calcium (Lipitor)  20 mg PO HS [facility]


   Last Admin: 12/17/17 22:04 Dose:  Not Given


Divalproex Sodium (Depakote Dr(*Bid*))  500 mg PO BID [facility]


   Last Admin: 12/18/17 08:52 Dose:  500 mg


Enalapril Maleate (Vasotec)  10 mg PO DAILY [facility]


   Last Admin: 12/18/17 08:52 Dose:  10 mg


Fenofibrate (Tricor)  145 mg PO DAILY [facility]


   Last Admin: 12/18/17 08:52 Dose:  145 mg


Haloperidol (Haldol)  1 mg PO Q8 PRN


   PRN Reason: Agitation


Haloperidol Lactate (Haldol)  1 mg IM Q8 PRN


   PRN Reason: Agitation


Lorazepam (Ativan)  0.5 mg PO Q8 PRN


   PRN Reason: Agitation


Lorazepam (Ativan)  0.5 mg IM Q8 PRN


   PRN Reason: Agitation


Metoprolol Tartrate (Lopressor)  50 mg PO Q12 [facility]


   Last Admin: 12/18/17 08:52 Dose:  50 mg


Quetiapine Fumarate (Seroquel)  50 mg PO Q12 [facility]


   Last Admin: 12/18/17 08:54 Dose:  50 mg











- Labs


Labs: 


 





 10/28/17 05:30 





 10/28/17 05:30 





 











PT  11.2 SECONDS (9.4-12.0)   12/14/15  05:20    


 


INR  1.05  (0.89-1.20)   12/14/15  05:20    


 


APTT  36.2 SECONDS (23.1-32.0)  H  12/14/15  05:20    














- Constitutional


Appears: Well, Non-toxic, No Acute Distress, Agitated





- Head Exam


Head Exam: ATRAUMATIC, NORMAL INSPECTION, NORMOCEPHALIC





- Eye Exam


Eye Exam: EOMI, Normal appearance, PERRL


Pupil Exam: NORMAL ACCOMODATION, PERRL





- ENT Exam


ENT Exam: Mucous Membranes Moist, Normal Exam





- Neck Exam


Neck Exam: Full ROM, Normal Inspection.  absent: Lymphadenopathy





- Respiratory Exam


Respiratory Exam: Clear to Ausculation Bilateral, NORMAL BREATHING PATTERN





- Cardiovascular Exam


Cardiovascular Exam: REGULAR RHYTHM, RRR, +S1, +S2.  absent: Murmur





- GI/Abdominal Exam


GI & Abdominal Exam: Soft, Normal Bowel Sounds.  absent: Tenderness





- Extremities Exam


Extremities Exam: Full ROM, Normal Capillary Refill, Normal Inspection.  absent

: Joint Swelling, Pedal Edema





- Back Exam


Back Exam: NORMAL INSPECTION





- Neurological Exam


Neurological Exam: Alert, Awake, CN II-XII Intact, Normal Gait, Oriented x3





- Psychiatric Exam


Psychiatric exam: Normal Affect, Normal Mood





- Skin


Skin Exam: Dry, Intact, Normal Color, Warm





Assessment and Plan


(1) DVT prophylaxis


Status: Chronic   





(2) Scrotal edema


Status: Chronic   





(3) CAD (coronary artery disease)


Status: Chronic   





(4) Smoking


Status: Chronic   





(5) Low back pain


Status: Chronic   





(6) Prediabetes


Status: Chronic   





(7) Neurocognitive disorder


Status: Chronic

## 2017-12-19 LAB
ALBUMIN SERPL-MCNC: 3.9 G/DL (ref 3.5–5)
ALBUMIN/GLOB SERPL: 1.1 {RATIO} (ref 1–2.1)
ALT SERPL-CCNC: 31 U/L (ref 21–72)
AST SERPL-CCNC: 26 U/L (ref 17–59)
BASOPHILS # BLD AUTO: 0 K/UL (ref 0–0.2)
BASOPHILS NFR BLD: 0.6 % (ref 0–2)
BUN SERPL-MCNC: 19 MG/DL (ref 9–20)
CALCIUM SERPL-MCNC: 9.2 MG/DL (ref 8.4–10.2)
EOSINOPHIL # BLD AUTO: 0.5 K/UL (ref 0–0.7)
EOSINOPHIL NFR BLD: 6 % (ref 0–4)
ERYTHROCYTE [DISTWIDTH] IN BLOOD BY AUTOMATED COUNT: 14 % (ref 11.5–14.5)
GFR NON-AFRICAN AMERICAN: 57
HGB BLD-MCNC: 12.1 G/DL (ref 12–18)
LYMPHOCYTES # BLD AUTO: 3.2 K/UL (ref 1–4.3)
LYMPHOCYTES NFR BLD AUTO: 40.1 % (ref 20–40)
MCH RBC QN AUTO: 29.7 PG (ref 27–31)
MCHC RBC AUTO-ENTMCNC: 31.9 G/DL (ref 33–37)
MCV RBC AUTO: 92.9 FL (ref 80–94)
MONOCYTES # BLD: 0.7 K/UL (ref 0–0.8)
MONOCYTES NFR BLD: 8.8 % (ref 0–10)
NEUTROPHILS # BLD: 3.5 K/UL (ref 1.8–7)
NEUTROPHILS NFR BLD AUTO: 44.5 % (ref 50–75)
NRBC BLD AUTO-RTO: 0 % (ref 0–0)
PLATELET # BLD: 236 K/UL (ref 130–400)
PMV BLD AUTO: 7.2 FL (ref 7.2–11.7)
RBC # BLD AUTO: 4.08 MIL/UL (ref 4.4–5.9)
WBC # BLD AUTO: 8 K/UL (ref 4.8–10.8)

## 2017-12-19 RX ADMIN — DIVALPROEX SODIUM SCH MG: 250 TABLET, DELAYED RELEASE ORAL at 09:06

## 2017-12-19 RX ADMIN — DIVALPROEX SODIUM SCH MG: 250 TABLET, DELAYED RELEASE ORAL at 19:02

## 2017-12-19 RX ADMIN — DIVALPROEX SODIUM SCH: 250 TABLET, DELAYED RELEASE ORAL at 09:11

## 2017-12-19 RX ADMIN — DIVALPROEX SODIUM SCH: 250 TABLET, DELAYED RELEASE ORAL at 16:51

## 2017-12-19 NOTE — CP.PCM.PN
Subjective





- Date & Time of Evaluation


Date of Evaluation: 12/19/17


Time of Evaluation: 07:24





- Subjective


Subjective: 





s/o no f/c, n/v/d. no other complaints.calm and cooperative. no abd pain, 

finished anbx. pt became agitated and aggressive. dc 1:1 and restraintsativan. 

will monitor. adjust meds prn, psych consult appriciated, incr seroqule to bid


a/p cont plan, cont placement, cont all medical management





Objective





- Vital Signs/Intake and Output


Vital Signs (last 24 hours): 


 











Temp Pulse Resp BP Pulse Ox


 


 98.4 F   71   18   100/60   97 


 


 12/19/17 00:00  12/19/17 00:00  12/19/17 00:00  12/19/17 00:00  12/19/17 00:00











- Medications


Medications: 


 Current Medications





Atorvastatin Calcium (Lipitor)  20 mg PO HS [facility]


   Last Admin: 12/18/17 21:32 Dose:  20 mg


Divalproex Sodium (Depakote Dr(*Bid*))  500 mg PO BID [facility]


   Last Admin: 12/18/17 16:44 Dose:  500 mg


Enalapril Maleate (Vasotec)  10 mg PO DAILY [facility]


   Last Admin: 12/18/17 08:52 Dose:  10 mg


Fenofibrate (Tricor)  145 mg PO DAILY [facility]


   Last Admin: 12/18/17 08:52 Dose:  145 mg


Haloperidol (Haldol)  1 mg PO Q8 PRN


   PRN Reason: Agitation


Haloperidol Lactate (Haldol)  1 mg IM Q8 PRN


   PRN Reason: Agitation


Lorazepam (Ativan)  0.5 mg PO Q8 PRN


   PRN Reason: Agitation


Lorazepam (Ativan)  0.5 mg IM Q8 PRN


   PRN Reason: Agitation


Metoprolol Tartrate (Lopressor)  50 mg PO Q12 [facility]


   Last Admin: 12/18/17 20:53 Dose:  50 mg


Quetiapine Fumarate (Seroquel)  50 mg PO Q12 [facility]


   Last Admin: 12/18/17 20:54 Dose:  50 mg











- Labs


Labs: 


 





 12/19/17 05:45 





 12/19/17 05:45 





 











PT  11.2 SECONDS (9.4-12.0)   12/14/15  05:20    


 


INR  1.05  (0.89-1.20)   12/14/15  05:20    


 


APTT  36.2 SECONDS (23.1-32.0)  H  12/14/15  05:20    














- Constitutional


Appears: Well, Non-toxic, No Acute Distress





- Head Exam


Head Exam: ATRAUMATIC, NORMAL INSPECTION, NORMOCEPHALIC





- Eye Exam


Eye Exam: EOMI, Normal appearance, PERRL


Pupil Exam: NORMAL ACCOMODATION, PERRL





- ENT Exam


ENT Exam: Mucous Membranes Moist, Normal Exam





- Neck Exam


Neck Exam: Full ROM, Normal Inspection.  absent: Lymphadenopathy





- Respiratory Exam


Respiratory Exam: Clear to Ausculation Bilateral, NORMAL BREATHING PATTERN





- Cardiovascular Exam


Cardiovascular Exam: REGULAR RHYTHM, RRR, +S1, +S2.  absent: Murmur





- GI/Abdominal Exam


GI & Abdominal Exam: Soft, Normal Bowel Sounds.  absent: Tenderness





- Extremities Exam


Extremities Exam: Full ROM, Normal Capillary Refill, Normal Inspection.  absent

: Joint Swelling, Pedal Edema





- Back Exam


Back Exam: NORMAL INSPECTION





- Neurological Exam


Neurological Exam: Alert, Awake, CN II-XII Intact, Normal Gait, Oriented x3





- Psychiatric Exam


Psychiatric exam: Normal Affect, Normal Mood





- Skin


Skin Exam: Dry, Intact, Normal Color, Warm





Assessment and Plan


(1) DVT prophylaxis


Status: Chronic   





(2) Scrotal edema


Status: Chronic   





(3) CAD (coronary artery disease)


Status: Chronic   





(4) Smoking


Status: Chronic   





(5) Low back pain


Status: Chronic   





(6) Prediabetes


Status: Chronic   





(7) Neurocognitive disorder


Status: Chronic

## 2017-12-20 RX ADMIN — DIVALPROEX SODIUM SCH MG: 250 TABLET, DELAYED RELEASE ORAL at 17:37

## 2017-12-20 RX ADMIN — DIVALPROEX SODIUM SCH MG: 250 TABLET, DELAYED RELEASE ORAL at 09:07

## 2017-12-20 NOTE — CP.PCM.PN
Subjective





- Date & Time of Evaluation


Date of Evaluation: 12/20/17


Time of Evaluation: 08:14





- Subjective


Subjective: 





s/o no f/c, n/v/d. no other complaints.calm and cooperative. no abd pain, 

finished anbx. will monitor. adjust meds prn, psych consult appriciated, 

depakote level wnl. pt calmer


a/p cont plan, cont placement, cont all medical management, cont psych meds





Objective





- Vital Signs/Intake and Output


Vital Signs (last 24 hours): 


 











Temp Pulse Resp BP Pulse Ox


 


 97.2 F L  60   20   102/69   97 


 


 12/20/17 07:48  12/20/17 07:48  12/20/17 07:48  12/20/17 07:48  12/20/17 07:48











- Medications


Medications: 


 Current Medications





Atorvastatin Calcium (Lipitor)  20 mg PO HS [facility]


   Last Admin: 12/19/17 21:38 Dose:  20 mg


Divalproex Sodium (Depakote Dr(*Bid*))  500 mg PO BID [facility]


   Last Admin: 12/19/17 19:02 Dose:  500 mg


Enalapril Maleate (Vasotec)  10 mg PO DAILY [facility]


   Last Admin: 12/19/17 09:11 Dose:  Not Given


Fenofibrate (Tricor)  145 mg PO DAILY [facility]


   Last Admin: 12/19/17 09:11 Dose:  Not Given


Haloperidol (Haldol)  1 mg PO Q8 PRN


   PRN Reason: Agitation


Haloperidol Lactate (Haldol)  1 mg IM Q8 PRN


   PRN Reason: Agitation


Lorazepam (Ativan)  0.5 mg PO Q8 PRN


   PRN Reason: Agitation


Lorazepam (Ativan)  0.5 mg IM Q8 PRN


   PRN Reason: Agitation


Metoprolol Tartrate (Lopressor)  50 mg PO Q12 [facility]


   Last Admin: 12/19/17 21:38 Dose:  50 mg


Quetiapine Fumarate (Seroquel)  50 mg PO Q12 [facility]


   Last Admin: 12/19/17 21:38 Dose:  50 mg











- Labs


Labs: 


 





 12/19/17 05:45 





 12/19/17 05:45 





 











PT  11.2 SECONDS (9.4-12.0)   12/14/15  05:20    


 


INR  1.05  (0.89-1.20)   12/14/15  05:20    


 


APTT  36.2 SECONDS (23.1-32.0)  H  12/14/15  05:20    














- Constitutional


Appears: Well, Non-toxic, No Acute Distress





- Head Exam


Head Exam: ATRAUMATIC, NORMAL INSPECTION, NORMOCEPHALIC





- Eye Exam


Eye Exam: EOMI, Normal appearance, PERRL


Pupil Exam: NORMAL ACCOMODATION, PERRL





- ENT Exam


ENT Exam: Mucous Membranes Moist, Normal Exam





- Neck Exam


Neck Exam: Full ROM, Normal Inspection.  absent: Lymphadenopathy





- Respiratory Exam


Respiratory Exam: Clear to Ausculation Bilateral, NORMAL BREATHING PATTERN





- Cardiovascular Exam


Cardiovascular Exam: REGULAR RHYTHM, RRR, +S1, +S2.  absent: Murmur





- GI/Abdominal Exam


GI & Abdominal Exam: Soft, Normal Bowel Sounds.  absent: Tenderness





- Extremities Exam


Extremities Exam: Full ROM, Normal Capillary Refill, Normal Inspection.  absent

: Joint Swelling, Pedal Edema





- Back Exam


Back Exam: NORMAL INSPECTION





- Neurological Exam


Neurological Exam: Alert, Awake, CN II-XII Intact, Normal Gait, Oriented x3





- Psychiatric Exam


Psychiatric exam: Normal Affect, Normal Mood





- Skin


Skin Exam: Dry, Intact, Normal Color, Warm





Assessment and Plan


(1) DVT prophylaxis


Status: Chronic   





(2) Scrotal edema


Status: Chronic   





(3) CAD (coronary artery disease)


Status: Chronic   





(4) Smoking


Status: Chronic   





(5) Low back pain


Status: Chronic   





(6) Prediabetes


Status: Chronic   





(7) Neurocognitive disorder


Status: Chronic

## 2017-12-21 RX ADMIN — DIVALPROEX SODIUM SCH MG: 250 TABLET, DELAYED RELEASE ORAL at 09:16

## 2017-12-21 RX ADMIN — DIVALPROEX SODIUM SCH MG: 250 TABLET, DELAYED RELEASE ORAL at 17:20

## 2017-12-21 NOTE — CP.PCM.PN
Subjective





- Date & Time of Evaluation


Date of Evaluation: 12/21/17


Time of Evaluation: 08:37





- Subjective


Subjective: 





s/o no f/c, n/v/d. no other complaints.calm and cooperative. no abd pain, 

finished anbx. will monitor. adjust meds prn, psych consult appriciated, 

depakote level wnl. pt calmer


a/p cont plan, cont placement, cont all medical management, cont psych meds





Objective





- Vital Signs/Intake and Output


Vital Signs (last 24 hours): 


 











Temp Pulse Resp BP Pulse Ox


 


 98.1 F   68   19   100/65   97 


 


 12/21/17 00:00  12/21/17 00:00  12/21/17 00:00  12/21/17 00:00  12/21/17 00:00











- Medications


Medications: 


 Current Medications





Atorvastatin Calcium (Lipitor)  20 mg PO HS [facility]


   Last Admin: 12/20/17 21:08 Dose:  20 mg


Divalproex Sodium (Depakote Dr(*Bid*))  500 mg PO BID [facility]


   Last Admin: 12/20/17 17:37 Dose:  500 mg


Enalapril Maleate (Vasotec)  10 mg PO DAILY [facility]


   Last Admin: 12/20/17 09:07 Dose:  10 mg


Fenofibrate (Tricor)  145 mg PO DAILY [facility]


   Last Admin: 12/20/17 09:06 Dose:  145 mg


Haloperidol (Haldol)  1 mg PO Q8 PRN


   PRN Reason: Agitation


Haloperidol Lactate (Haldol)  1 mg IM Q8 PRN


   PRN Reason: Agitation


Lorazepam (Ativan)  0.5 mg PO Q8 PRN


   PRN Reason: Agitation


Lorazepam (Ativan)  0.5 mg IM Q8 PRN


   PRN Reason: Agitation


Metoprolol Tartrate (Lopressor)  50 mg PO Q12 [facility]


   Last Admin: 12/20/17 21:08 Dose:  50 mg


Quetiapine Fumarate (Seroquel)  50 mg PO Q12 [facility]


   Last Admin: 12/20/17 21:08 Dose:  50 mg











- Labs


Labs: 


 





 12/19/17 05:45 





 12/19/17 05:45 





 











PT  11.2 SECONDS (9.4-12.0)   12/14/15  05:20    


 


INR  1.05  (0.89-1.20)   12/14/15  05:20    


 


APTT  36.2 SECONDS (23.1-32.0)  H  12/14/15  05:20    














- Constitutional


Appears: Well, Non-toxic, No Acute Distress





- Head Exam


Head Exam: ATRAUMATIC, NORMAL INSPECTION, NORMOCEPHALIC





- Eye Exam


Eye Exam: EOMI, Normal appearance, PERRL


Pupil Exam: NORMAL ACCOMODATION, PERRL





- ENT Exam


ENT Exam: Mucous Membranes Moist, Normal Exam





- Neck Exam


Neck Exam: Full ROM, Normal Inspection.  absent: Lymphadenopathy





- Respiratory Exam


Respiratory Exam: Clear to Ausculation Bilateral, NORMAL BREATHING PATTERN





- Cardiovascular Exam


Cardiovascular Exam: REGULAR RHYTHM, RRR, +S1, +S2.  absent: Murmur





- GI/Abdominal Exam


GI & Abdominal Exam: Soft, Normal Bowel Sounds.  absent: Tenderness





- Extremities Exam


Extremities Exam: Full ROM, Normal Capillary Refill, Normal Inspection.  absent

: Joint Swelling, Pedal Edema





- Back Exam


Back Exam: NORMAL INSPECTION





- Neurological Exam


Neurological Exam: Alert, Awake, CN II-XII Intact, Normal Gait, Oriented x3





- Psychiatric Exam


Psychiatric exam: Normal Affect, Normal Mood





- Skin


Skin Exam: Dry, Intact, Normal Color, Warm





Assessment and Plan


(1) DVT prophylaxis


Status: Chronic   





(2) Scrotal edema


Status: Chronic   





(3) CAD (coronary artery disease)


Status: Chronic   





(4) Smoking


Status: Chronic   





(5) Low back pain


Status: Chronic   





(6) Prediabetes


Status: Chronic   





(7) Neurocognitive disorder


Status: Chronic

## 2017-12-22 RX ADMIN — DIVALPROEX SODIUM SCH MG: 250 TABLET, DELAYED RELEASE ORAL at 16:46

## 2017-12-22 RX ADMIN — DIVALPROEX SODIUM SCH MG: 250 TABLET, DELAYED RELEASE ORAL at 08:41

## 2017-12-22 NOTE — CP.PCM.PN
Subjective





- Date & Time of Evaluation


Date of Evaluation: 12/22/17


Time of Evaluation: 21:00





- Subjective


Subjective: 





s/o no f/c, n/v/d. no other complaints.calm and cooperative. no abd pain, 

finished anbx. will monitor. adjust meds prn, psych consult appriciated, 

depakote level wnl. pt calmer


a/p cont plan, cont placement, cont all medical management, cont psych meds





Objective





- Vital Signs/Intake and Output


Vital Signs (last 24 hours): 


 











Temp Pulse Resp BP Pulse Ox


 


 98.1 F   72   15   106/67   97 


 


 12/22/17 17:00  12/22/17 17:00  12/22/17 17:00  12/22/17 17:00  12/22/17 17:00











- Medications


Medications: 


 Current Medications





Atorvastatin Calcium (Lipitor)  20 mg PO HS [facility]


   Last Admin: 12/21/17 21:07 Dose:  20 mg


Divalproex Sodium (Depakote Dr(*Bid*))  500 mg PO BID [facility]


   Last Admin: 12/22/17 16:46 Dose:  500 mg


Enalapril Maleate (Vasotec)  10 mg PO DAILY [facility]


   Last Admin: 12/22/17 08:42 Dose:  10 mg


Fenofibrate (Tricor)  145 mg PO DAILY [facility]


   Last Admin: 12/22/17 08:43 Dose:  145 mg


Haloperidol (Haldol)  1 mg PO Q8 PRN


   PRN Reason: Agitation


Haloperidol Lactate (Haldol)  1 mg IM Q8 PRN


   PRN Reason: Agitation


Lorazepam (Ativan)  0.5 mg PO Q8 PRN


   PRN Reason: Agitation


Metoprolol Tartrate (Lopressor)  50 mg PO Q12 [facility]


   Last Admin: 12/22/17 08:41 Dose:  50 mg


Quetiapine Fumarate (Seroquel)  50 mg PO Q12 [facility]


   Last Admin: 12/22/17 08:42 Dose:  50 mg











- Labs


Labs: 


 





 12/19/17 05:45 





 12/19/17 05:45 





 











PT  11.2 SECONDS (9.4-12.0)   12/14/15  05:20    


 


INR  1.05  (0.89-1.20)   12/14/15  05:20    


 


APTT  36.2 SECONDS (23.1-32.0)  H  12/14/15  05:20    














- Constitutional


Appears: Well, Non-toxic, No Acute Distress





- Head Exam


Head Exam: ATRAUMATIC, NORMAL INSPECTION, NORMOCEPHALIC





- Eye Exam


Eye Exam: EOMI, Normal appearance, PERRL


Pupil Exam: NORMAL ACCOMODATION, PERRL





- ENT Exam


ENT Exam: Mucous Membranes Moist, Normal Exam





- Neck Exam


Neck Exam: Full ROM, Normal Inspection.  absent: Lymphadenopathy





- Respiratory Exam


Respiratory Exam: Clear to Ausculation Bilateral, NORMAL BREATHING PATTERN





- Cardiovascular Exam


Cardiovascular Exam: REGULAR RHYTHM, RRR, +S1, +S2.  absent: Murmur





- GI/Abdominal Exam


GI & Abdominal Exam: Soft, Normal Bowel Sounds.  absent: Tenderness





- Extremities Exam


Extremities Exam: Full ROM, Normal Capillary Refill, Normal Inspection.  absent

: Joint Swelling, Pedal Edema





- Back Exam


Back Exam: NORMAL INSPECTION





- Neurological Exam


Neurological Exam: Alert, Awake, CN II-XII Intact, Normal Gait, Oriented x3





- Psychiatric Exam


Psychiatric exam: Normal Affect, Normal Mood





- Skin


Skin Exam: Dry, Intact, Normal Color, Warm





Assessment and Plan


(1) DVT prophylaxis


Status: Chronic   





(2) Scrotal edema


Status: Chronic   





(3) CAD (coronary artery disease)


Status: Chronic   





(4) Smoking


Status: Chronic   





(5) Low back pain


Status: Chronic   





(6) Prediabetes


Status: Chronic   





(7) Neurocognitive disorder


Status: Chronic

## 2017-12-23 RX ADMIN — DIVALPROEX SODIUM SCH MG: 250 TABLET, DELAYED RELEASE ORAL at 09:05

## 2017-12-23 RX ADMIN — DIVALPROEX SODIUM SCH MG: 250 TABLET, DELAYED RELEASE ORAL at 16:30

## 2017-12-23 NOTE — CP.PCM.PN
Subjective





- Date & Time of Evaluation


Date of Evaluation: 12/23/17


Time of Evaluation: 11:04





- Subjective


Subjective: 





s/o no f/c, n/v/d. no other complaints.calm and cooperative. no abd pain, psych 

consult appriciated, 


a/p cont plan, cont placement, cont all medical management, cont psych meds


remains unable to make medical decisians for self. pending state disability/

guardianship/placement





Objective





- Vital Signs/Intake and Output


Vital Signs (last 24 hours): 


 











Temp Pulse Resp BP Pulse Ox


 


 97.2 F L  66   20   120/84   97 


 


 12/23/17 07:27  12/23/17 09:05  12/23/17 07:27  12/23/17 09:05  12/23/17 07:27











- Medications


Medications: 


 Current Medications





Atorvastatin Calcium (Lipitor)  20 mg PO HS [facility]


   Last Admin: 12/22/17 21:26 Dose:  20 mg


Divalproex Sodium (Depakote Dr(*Bid*))  500 mg PO BID [facility]


   Last Admin: 12/23/17 09:05 Dose:  500 mg


Enalapril Maleate (Vasotec)  10 mg PO DAILY [facility]


   Last Admin: 12/23/17 09:06 Dose:  10 mg


Fenofibrate (Tricor)  145 mg PO DAILY [facility]


   Last Admin: 12/23/17 09:05 Dose:  145 mg


Haloperidol (Haldol)  1 mg PO Q8 PRN


   PRN Reason: Agitation


Haloperidol Lactate (Haldol)  1 mg IM Q8 PRN


   PRN Reason: Agitation


Lorazepam (Ativan)  0.5 mg PO Q8 PRN


   PRN Reason: Agitation


Metoprolol Tartrate (Lopressor)  50 mg PO Q12 [facility]


   Last Admin: 12/23/17 09:05 Dose:  50 mg


Quetiapine Fumarate (Seroquel)  50 mg PO Q12 [facility]


   Last Admin: 12/23/17 09:06 Dose:  50 mg











- Labs


Labs: 


 





 12/19/17 05:45 





 12/19/17 05:45 





 











PT  11.2 SECONDS (9.4-12.0)   12/14/15  05:20    


 


INR  1.05  (0.89-1.20)   12/14/15  05:20    


 


APTT  36.2 SECONDS (23.1-32.0)  H  12/14/15  05:20    














Assessment and Plan


(1) DVT prophylaxis


Status: Chronic   





(2) Scrotal edema


Status: Chronic   





(3) CAD (coronary artery disease)


Status: Chronic   





(4) Smoking


Status: Chronic   





(5) Low back pain


Status: Chronic   





(6) Prediabetes


Status: Chronic   





(7) Neurocognitive disorder


Status: Chronic

## 2017-12-24 RX ADMIN — DIVALPROEX SODIUM SCH MG: 250 TABLET, DELAYED RELEASE ORAL at 08:40

## 2017-12-24 RX ADMIN — DIVALPROEX SODIUM SCH MG: 250 TABLET, DELAYED RELEASE ORAL at 16:42

## 2017-12-24 NOTE — CP.PCM.PN
Subjective





- Date & Time of Evaluation


Date of Evaluation: 12/24/17


Time of Evaluation: 09:39





- Subjective


Subjective: 





s/o no f/c, n/v/d. no other complaints.calm and cooperative. no abd pain, psych 

consult appriciated, 


a/p cont plan, cont placement, cont all medical management, cont psych meds


remains unable to make medical decisians for self. pending state disability/

guardianship/placement





Objective





- Vital Signs/Intake and Output


Vital Signs (last 24 hours): 


 











Temp Pulse Resp BP Pulse Ox


 


 97.2 F L  67   18   104/67   97 


 


 12/24/17 07:30  12/24/17 07:30  12/24/17 07:30  12/24/17 07:30  12/24/17 07:30











- Medications


Medications: 


 Current Medications





Atorvastatin Calcium (Lipitor)  20 mg PO HS [facility]


   Last Admin: 12/23/17 21:53 Dose:  20 mg


Divalproex Sodium (Depakote Dr(*Bid*))  500 mg PO BID [facility]


   Last Admin: 12/24/17 08:40 Dose:  500 mg


Enalapril Maleate (Vasotec)  10 mg PO DAILY [facility]


   Last Admin: 12/24/17 08:40 Dose:  10 mg


Fenofibrate (Tricor)  145 mg PO DAILY [facility]


   Last Admin: 12/24/17 08:41 Dose:  145 mg


Haloperidol (Haldol)  1 mg PO Q8 PRN


   PRN Reason: Agitation


Haloperidol Lactate (Haldol)  1 mg IM Q8 PRN


   PRN Reason: Agitation


Lorazepam (Ativan)  0.5 mg PO Q8 PRN


   PRN Reason: Agitation


Metoprolol Tartrate (Lopressor)  50 mg PO Q12 [facility]


   Last Admin: 12/24/17 08:41 Dose:  50 mg


Quetiapine Fumarate (Seroquel)  50 mg PO Q12 [facility]


   Last Admin: 12/24/17 08:41 Dose:  50 mg











- Labs


Labs: 


 





 12/19/17 05:45 





 12/19/17 05:45 





 











PT  11.2 SECONDS (9.4-12.0)   12/14/15  05:20    


 


INR  1.05  (0.89-1.20)   12/14/15  05:20    


 


APTT  36.2 SECONDS (23.1-32.0)  H  12/14/15  05:20    














- Constitutional


Appears: Well, Non-toxic, No Acute Distress





- Head Exam


Head Exam: ATRAUMATIC, NORMAL INSPECTION, NORMOCEPHALIC





- Eye Exam


Eye Exam: EOMI, Normal appearance, PERRL


Pupil Exam: NORMAL ACCOMODATION, PERRL





- ENT Exam


ENT Exam: Mucous Membranes Moist, Normal Exam





- Neck Exam


Neck Exam: Full ROM, Normal Inspection.  absent: Lymphadenopathy





- Respiratory Exam


Respiratory Exam: Clear to Ausculation Bilateral, NORMAL BREATHING PATTERN





- Cardiovascular Exam


Cardiovascular Exam: REGULAR RHYTHM, RRR, +S1, +S2.  absent: Murmur





- GI/Abdominal Exam


GI & Abdominal Exam: Soft, Normal Bowel Sounds.  absent: Tenderness





- Extremities Exam


Extremities Exam: Full ROM, Normal Capillary Refill, Normal Inspection.  absent

: Joint Swelling, Pedal Edema





- Back Exam


Back Exam: NORMAL INSPECTION





- Neurological Exam


Neurological Exam: Alert, Awake, CN II-XII Intact, Normal Gait, Oriented x3





- Psychiatric Exam


Psychiatric exam: Normal Affect, Normal Mood





- Skin


Skin Exam: Dry, Intact, Normal Color, Warm





Assessment and Plan


(1) DVT prophylaxis


Status: Chronic   





(2) Scrotal edema


Status: Chronic   





(3) CAD (coronary artery disease)


Status: Chronic   





(4) Smoking


Status: Chronic   





(5) Low back pain


Status: Chronic   





(6) Prediabetes


Status: Chronic   





(7) Neurocognitive disorder


Status: Chronic

## 2017-12-25 RX ADMIN — DIVALPROEX SODIUM SCH MG: 250 TABLET, DELAYED RELEASE ORAL at 09:08

## 2017-12-25 RX ADMIN — DIVALPROEX SODIUM SCH MG: 250 TABLET, DELAYED RELEASE ORAL at 17:41

## 2017-12-25 NOTE — CP.PCM.PN
Subjective





- Date & Time of Evaluation


Date of Evaluation: 12/25/17


Time of Evaluation: 08:53





- Subjective


Subjective: 





s/o no f/c, n/v/d. no other complaints.calm and cooperative. no abd pain, psych 

consult appriciated, 


a/p cont plan, cont placement, cont all medical management, cont psych meds


remains unable to make medical decisians for self. pending state disability/

guardianship/placement








Objective





- Vital Signs/Intake and Output


Vital Signs (last 24 hours): 


 











Temp Pulse Resp BP Pulse Ox


 


 97.8 F   60   18   114/77   98 


 


 12/25/17 08:09  12/25/17 08:09  12/25/17 08:09  12/25/17 08:09  12/25/17 08:09











- Medications


Medications: 


 Current Medications





Atorvastatin Calcium (Lipitor)  20 mg PO HS [facility]


   Last Admin: 12/24/17 21:19 Dose:  20 mg


Divalproex Sodium (Depakote Dr(*Bid*))  500 mg PO BID [facility]


   Last Admin: 12/24/17 16:42 Dose:  500 mg


Enalapril Maleate (Vasotec)  10 mg PO DAILY [facility]


   Last Admin: 12/24/17 08:40 Dose:  10 mg


Fenofibrate (Tricor)  145 mg PO DAILY [facility]


   Last Admin: 12/24/17 08:41 Dose:  145 mg


Haloperidol (Haldol)  1 mg PO Q8 PRN


   PRN Reason: Agitation


Haloperidol Lactate (Haldol)  1 mg IM Q8 PRN


   PRN Reason: Agitation


Lorazepam (Ativan)  0.5 mg PO Q8 PRN


   PRN Reason: Agitation


Metoprolol Tartrate (Lopressor)  50 mg PO Q12 [facility]


   Last Admin: 12/24/17 21:19 Dose:  50 mg


Quetiapine Fumarate (Seroquel)  50 mg PO Q12 [facility]


   Last Admin: 12/24/17 21:17 Dose:  50 mg











- Labs


Labs: 


 





 12/19/17 05:45 





 12/19/17 05:45 





 











PT  11.2 SECONDS (9.4-12.0)   12/14/15  05:20    


 


INR  1.05  (0.89-1.20)   12/14/15  05:20    


 


APTT  36.2 SECONDS (23.1-32.0)  H  12/14/15  05:20    














- Constitutional


Appears: Well, Non-toxic, No Acute Distress





- Head Exam


Head Exam: ATRAUMATIC, NORMAL INSPECTION, NORMOCEPHALIC





- Eye Exam


Eye Exam: EOMI, Normal appearance, PERRL


Pupil Exam: NORMAL ACCOMODATION, PERRL





- ENT Exam


ENT Exam: Mucous Membranes Moist, Normal Exam





- Neck Exam


Neck Exam: Full ROM, Normal Inspection.  absent: Lymphadenopathy





- Respiratory Exam


Respiratory Exam: Clear to Ausculation Bilateral, NORMAL BREATHING PATTERN





- Cardiovascular Exam


Cardiovascular Exam: REGULAR RHYTHM, RRR, +S1, +S2.  absent: Murmur





- GI/Abdominal Exam


GI & Abdominal Exam: Soft, Normal Bowel Sounds.  absent: Tenderness





- Extremities Exam


Extremities Exam: Full ROM, Normal Capillary Refill, Normal Inspection.  absent

: Joint Swelling, Pedal Edema





- Back Exam


Back Exam: NORMAL INSPECTION





- Neurological Exam


Neurological Exam: Alert, Awake, CN II-XII Intact, Normal Gait, Oriented x3





- Psychiatric Exam


Psychiatric exam: Normal Affect, Normal Mood





- Skin


Skin Exam: Dry, Intact, Normal Color, Warm





Assessment and Plan


(1) DVT prophylaxis


Status: Chronic   





(2) Scrotal edema


Status: Chronic   





(3) CAD (coronary artery disease)


Status: Chronic   





(4) Smoking


Status: Chronic   





(5) Low back pain


Status: Chronic   





(6) Prediabetes


Status: Chronic   





(7) Neurocognitive disorder


Status: Chronic

## 2017-12-26 RX ADMIN — DIVALPROEX SODIUM SCH MG: 250 TABLET, DELAYED RELEASE ORAL at 09:03

## 2017-12-26 RX ADMIN — DIVALPROEX SODIUM SCH MG: 250 TABLET, DELAYED RELEASE ORAL at 16:54

## 2017-12-26 NOTE — CP.PCM.PN
Subjective





- Date & Time of Evaluation


Date of Evaluation: 12/26/17


Time of Evaluation: 07:54





- Subjective


Subjective: 





s/o no f/c, n/v/d. no other complaints.calm and cooperative. no abd pain, psych 

consult appriciated, 


a/p cont plan, cont placement, cont all medical management, cont psych meds


remains unable to make medical decisians for self. pending state disability/

guardianship/placement





Objective





- Vital Signs/Intake and Output


Vital Signs (last 24 hours): 


 











Temp Pulse Resp BP Pulse Ox


 


 97.8 F   73   19   105/68   95 


 


 12/26/17 00:00  12/26/17 00:00  12/26/17 00:00  12/26/17 00:00  12/26/17 00:00











- Medications


Medications: 


 Current Medications





Atorvastatin Calcium (Lipitor)  20 mg PO HS [facility]


   Last Admin: 12/25/17 20:59 Dose:  20 mg


Divalproex Sodium (Depakote Dr(*Bid*))  500 mg PO BID [facility]


   Last Admin: 12/25/17 17:41 Dose:  500 mg


Enalapril Maleate (Vasotec)  10 mg PO DAILY [facility]


   Last Admin: 12/25/17 09:10 Dose:  10 mg


Fenofibrate (Tricor)  145 mg PO DAILY [facility]


   Last Admin: 12/25/17 09:10 Dose:  145 mg


Haloperidol (Haldol)  1 mg PO Q8 PRN


   PRN Reason: Agitation


Haloperidol Lactate (Haldol)  1 mg IM Q8 PRN


   PRN Reason: Agitation


Lorazepam (Ativan)  0.5 mg PO Q8 PRN


   PRN Reason: Agitation


Metoprolol Tartrate (Lopressor)  50 mg PO Q12 [facility]


   Last Admin: 12/25/17 21:05 Dose:  Not Given


Quetiapine Fumarate (Seroquel)  50 mg PO Q12 [facility]


   Last Admin: 12/25/17 20:59 Dose:  50 mg











- Labs


Labs: 


 





 12/19/17 05:45 





 12/19/17 05:45 





 











PT  11.2 SECONDS (9.4-12.0)   12/14/15  05:20    


 


INR  1.05  (0.89-1.20)   12/14/15  05:20    


 


APTT  36.2 SECONDS (23.1-32.0)  H  12/14/15  05:20    














- Constitutional


Appears: Well, Non-toxic, No Acute Distress





- Head Exam


Head Exam: ATRAUMATIC, NORMAL INSPECTION, NORMOCEPHALIC





- Eye Exam


Eye Exam: EOMI, Normal appearance, PERRL


Pupil Exam: NORMAL ACCOMODATION, PERRL





- ENT Exam


ENT Exam: Mucous Membranes Moist, Normal Exam





- Neck Exam


Neck Exam: Full ROM, Normal Inspection.  absent: Lymphadenopathy





- Respiratory Exam


Respiratory Exam: Clear to Ausculation Bilateral, NORMAL BREATHING PATTERN





- Cardiovascular Exam


Cardiovascular Exam: REGULAR RHYTHM, RRR, +S1, +S2.  absent: Murmur





- GI/Abdominal Exam


GI & Abdominal Exam: Soft, Normal Bowel Sounds.  absent: Tenderness





- Extremities Exam


Extremities Exam: Full ROM, Normal Capillary Refill, Normal Inspection.  absent

: Joint Swelling, Pedal Edema





- Back Exam


Back Exam: NORMAL INSPECTION





- Neurological Exam


Neurological Exam: Alert, Awake, CN II-XII Intact, Normal Gait, Oriented x3





- Psychiatric Exam


Psychiatric exam: Normal Affect, Normal Mood





- Skin


Skin Exam: Dry, Intact, Normal Color, Warm





Assessment and Plan


(1) DVT prophylaxis


Status: Chronic   





(2) Scrotal edema


Status: Chronic   





(3) CAD (coronary artery disease)


Status: Chronic   





(4) Smoking


Status: Chronic   





(5) Low back pain


Status: Chronic   





(6) Prediabetes


Status: Chronic   





(7) Neurocognitive disorder


Status: Chronic

## 2017-12-27 RX ADMIN — DIVALPROEX SODIUM SCH MG: 250 TABLET, DELAYED RELEASE ORAL at 17:03

## 2017-12-27 RX ADMIN — DIVALPROEX SODIUM SCH MG: 250 TABLET, DELAYED RELEASE ORAL at 09:13

## 2017-12-27 NOTE — CP.PCM.PN
Subjective





- Date & Time of Evaluation


Date of Evaluation: 12/27/17


Time of Evaluation: 09:23





- Subjective


Subjective: 





s/o no f/c, n/v/d. no other complaints.calm and cooperative. no abd pain, psych 

consult appriciated, 


a/p cont plan, cont placement, cont all medical management, cont psych meds


remains unable to make medical decisians for self. pending state disability/

guardianship/placement





Objective





- Vital Signs/Intake and Output


Vital Signs (last 24 hours): 


 











Temp Pulse Resp BP Pulse Ox


 


 98.1 F   66   20   111/67   97 


 


 12/27/17 07:49  12/27/17 09:12  12/27/17 07:49  12/27/17 09:12  12/27/17 07:49











- Medications


Medications: 


 Current Medications





Atorvastatin Calcium (Lipitor)  20 mg PO HS [facility]


   Last Admin: 12/26/17 22:03 Dose:  20 mg


Divalproex Sodium (Depakote Dr(*Bid*))  500 mg PO BID [facility]


   Last Admin: 12/27/17 09:13 Dose:  500 mg


Enalapril Maleate (Vasotec)  10 mg PO DAILY [facility]


   Last Admin: 12/27/17 09:13 Dose:  10 mg


Fenofibrate (Tricor)  145 mg PO DAILY [facility]


   Last Admin: 12/27/17 09:12 Dose:  145 mg


Haloperidol (Haldol)  1 mg PO Q8 PRN


   PRN Reason: Agitation


Haloperidol Lactate (Haldol)  1 mg IM Q8 PRN


   PRN Reason: Agitation


Lorazepam (Ativan)  0.5 mg PO Q8 PRN


   PRN Reason: Agitation


Metoprolol Tartrate (Lopressor)  50 mg PO Q12 [facility]


   Last Admin: 12/27/17 09:12 Dose:  50 mg


Quetiapine Fumarate (Seroquel)  50 mg PO Q12 [facility]


   Last Admin: 12/27/17 09:13 Dose:  50 mg











- Labs


Labs: 


 





 12/19/17 05:45 





 12/19/17 05:45 





 











PT  11.2 SECONDS (9.4-12.0)   12/14/15  05:20    


 


INR  1.05  (0.89-1.20)   12/14/15  05:20    


 


APTT  36.2 SECONDS (23.1-32.0)  H  12/14/15  05:20    














- Constitutional


Appears: Well, Non-toxic, No Acute Distress





- Head Exam


Head Exam: ATRAUMATIC, NORMAL INSPECTION, NORMOCEPHALIC





- Eye Exam


Eye Exam: EOMI, Normal appearance, PERRL


Pupil Exam: NORMAL ACCOMODATION, PERRL





- ENT Exam


ENT Exam: Mucous Membranes Moist, Normal Exam





- Neck Exam


Neck Exam: Full ROM, Normal Inspection.  absent: Lymphadenopathy





- Respiratory Exam


Respiratory Exam: Clear to Ausculation Bilateral, NORMAL BREATHING PATTERN





- Cardiovascular Exam


Cardiovascular Exam: REGULAR RHYTHM, RRR, +S1, +S2.  absent: Murmur





- GI/Abdominal Exam


GI & Abdominal Exam: Soft, Normal Bowel Sounds.  absent: Tenderness





- Extremities Exam


Extremities Exam: Full ROM, Normal Capillary Refill, Normal Inspection.  absent

: Joint Swelling, Pedal Edema





- Back Exam


Back Exam: NORMAL INSPECTION





- Neurological Exam


Neurological Exam: Alert, Awake, CN II-XII Intact, Normal Gait, Oriented x3





- Psychiatric Exam


Psychiatric exam: Normal Affect, Normal Mood





- Skin


Skin Exam: Dry, Intact, Normal Color, Warm





Assessment and Plan


(1) DVT prophylaxis


Status: Chronic   





(2) Scrotal edema


Status: Chronic   





(3) CAD (coronary artery disease)


Status: Chronic   





(4) Smoking


Status: Chronic   





(5) Low back pain


Status: Chronic   





(6) Prediabetes


Status: Chronic   





(7) Neurocognitive disorder


Status: Chronic

## 2017-12-28 RX ADMIN — DIVALPROEX SODIUM SCH MG: 250 TABLET, DELAYED RELEASE ORAL at 17:41

## 2017-12-28 RX ADMIN — DIVALPROEX SODIUM SCH MG: 250 TABLET, DELAYED RELEASE ORAL at 10:09

## 2017-12-28 NOTE — CP.PCM.PN
Subjective





- Date & Time of Evaluation


Date of Evaluation: 12/28/17


Time of Evaluation: 07:23





- Subjective


Subjective: 





s/o no f/c, n/v/d. no other complaints.calm and cooperative. no abd pain, psych 

consult appriciated, 


a/p cont plan, cont placement, cont all medical management, cont psych meds


remains unable to make medical decisians for self. pending state disability/

guardianship/placement





Objective





- Vital Signs/Intake and Output


Vital Signs (last 24 hours): 


 











Temp Pulse Resp BP Pulse Ox


 


 97.9 F   69   18   95/62 L  95 


 


 12/28/17 00:06  12/28/17 00:06  12/28/17 00:06  12/28/17 00:06  12/28/17 00:06











- Medications


Medications: 


 Current Medications





Atorvastatin Calcium (Lipitor)  20 mg PO HS [facility]


   Last Admin: 12/27/17 21:08 Dose:  20 mg


Divalproex Sodium (Depakote Dr(*Bid*))  500 mg PO BID [facility]


   Last Admin: 12/27/17 17:03 Dose:  500 mg


Enalapril Maleate (Vasotec)  10 mg PO DAILY [facility]


   Last Admin: 12/27/17 09:13 Dose:  10 mg


Fenofibrate (Tricor)  145 mg PO DAILY [facility]


   Last Admin: 12/27/17 09:12 Dose:  145 mg


Haloperidol (Haldol)  1 mg PO Q8 PRN


   PRN Reason: Agitation


Haloperidol Lactate (Haldol)  1 mg IM Q8 PRN


   PRN Reason: Agitation


Lorazepam (Ativan)  0.5 mg PO Q8 PRN


   PRN Reason: Agitation


Metoprolol Tartrate (Lopressor)  50 mg PO Q12 [facility]


   Last Admin: 12/27/17 21:08 Dose:  50 mg


Quetiapine Fumarate (Seroquel)  50 mg PO Q12 [facility]


   Last Admin: 12/27/17 21:08 Dose:  50 mg











- Labs


Labs: 


 





 12/19/17 05:45 





 12/19/17 05:45 





 











PT  11.2 SECONDS (9.4-12.0)   12/14/15  05:20    


 


INR  1.05  (0.89-1.20)   12/14/15  05:20    


 


APTT  36.2 SECONDS (23.1-32.0)  H  12/14/15  05:20    














- Constitutional


Appears: Well, Non-toxic, No Acute Distress





- Head Exam


Head Exam: ATRAUMATIC, NORMAL INSPECTION, NORMOCEPHALIC





- Eye Exam


Eye Exam: EOMI, Normal appearance, PERRL


Pupil Exam: NORMAL ACCOMODATION, PERRL





- ENT Exam


ENT Exam: Mucous Membranes Moist, Normal Exam





- Neck Exam


Neck Exam: Full ROM, Normal Inspection.  absent: Lymphadenopathy





- Respiratory Exam


Respiratory Exam: Clear to Ausculation Bilateral, NORMAL BREATHING PATTERN





- Cardiovascular Exam


Cardiovascular Exam: REGULAR RHYTHM, RRR, +S1, +S2.  absent: Murmur





- GI/Abdominal Exam


GI & Abdominal Exam: Soft, Normal Bowel Sounds.  absent: Tenderness





- Extremities Exam


Extremities Exam: Full ROM, Normal Capillary Refill, Normal Inspection.  absent

: Joint Swelling, Pedal Edema





- Back Exam


Back Exam: NORMAL INSPECTION





- Neurological Exam


Neurological Exam: Alert, Awake, CN II-XII Intact, Normal Gait, Oriented x3





- Psychiatric Exam


Psychiatric exam: Normal Affect, Normal Mood





- Skin


Skin Exam: Dry, Intact, Normal Color, Warm





Assessment and Plan


(1) DVT prophylaxis


Status: Chronic   





(2) Scrotal edema


Status: Chronic   





(3) CAD (coronary artery disease)


Status: Chronic   





(4) Smoking


Status: Chronic   





(5) Low back pain


Status: Chronic   





(6) Prediabetes


Status: Chronic   





(7) Neurocognitive disorder


Status: Chronic

## 2017-12-29 RX ADMIN — DIVALPROEX SODIUM SCH MG: 250 TABLET, DELAYED RELEASE ORAL at 09:00

## 2017-12-29 RX ADMIN — DIVALPROEX SODIUM SCH MG: 250 TABLET, DELAYED RELEASE ORAL at 16:30

## 2017-12-29 NOTE — CP.PCM.PN
Subjective





- Date & Time of Evaluation


Date of Evaluation: 12/29/17


Time of Evaluation: 08:04





- Subjective


Subjective: 





s/o no f/c, n/v/d. no other complaints.calm and cooperative. no abd pain, psych 

consult appriciated, 


a/p cont plan, cont placement, cont all medical management, cont psych meds


remains unable to make medical decisians for self. pending state disability/

guardianship/placement





Objective





- Vital Signs/Intake and Output


Vital Signs (last 24 hours): 


 











Temp Pulse Resp BP Pulse Ox


 


 97.5 F L  65   20   106/70   97 


 


 12/29/17 07:39  12/29/17 07:39  12/29/17 07:39  12/29/17 07:39  12/29/17 07:39











- Medications


Medications: 


 Current Medications





Atorvastatin Calcium (Lipitor)  20 mg PO HS [facility]


   Last Admin: 12/28/17 21:59 Dose:  20 mg


Divalproex Sodium (Depakote Dr(*Bid*))  500 mg PO BID [facility]


   Last Admin: 12/28/17 17:41 Dose:  500 mg


Enalapril Maleate (Vasotec)  10 mg PO DAILY [facility]


   Last Admin: 12/28/17 10:10 Dose:  10 mg


Fenofibrate (Tricor)  145 mg PO DAILY [facility]


   Last Admin: 12/28/17 10:08 Dose:  145 mg


Haloperidol (Haldol)  1 mg PO Q8 PRN


   PRN Reason: Agitation


Haloperidol Lactate (Haldol)  1 mg IM Q8 PRN


   PRN Reason: Agitation


Lorazepam (Ativan)  0.5 mg PO Q8 PRN


   PRN Reason: Agitation


Metoprolol Tartrate (Lopressor)  50 mg PO Q12 [facility]


   Last Admin: 12/28/17 21:59 Dose:  50 mg


Quetiapine Fumarate (Seroquel)  50 mg PO Q12 [facility]


   Last Admin: 12/28/17 21:59 Dose:  50 mg











- Labs


Labs: 


 





 12/19/17 05:45 





 12/19/17 05:45 





 











PT  11.2 SECONDS (9.4-12.0)   12/14/15  05:20    


 


INR  1.05  (0.89-1.20)   12/14/15  05:20    


 


APTT  36.2 SECONDS (23.1-32.0)  H  12/14/15  05:20    














- Constitutional


Appears: Well, Non-toxic, No Acute Distress





- Head Exam


Head Exam: ATRAUMATIC, NORMAL INSPECTION, NORMOCEPHALIC





- Eye Exam


Eye Exam: EOMI, Normal appearance, PERRL


Pupil Exam: NORMAL ACCOMODATION, PERRL





- ENT Exam


ENT Exam: Mucous Membranes Moist, Normal Exam





- Neck Exam


Neck Exam: Full ROM, Normal Inspection.  absent: Lymphadenopathy





- Respiratory Exam


Respiratory Exam: Clear to Ausculation Bilateral, NORMAL BREATHING PATTERN





- Cardiovascular Exam


Cardiovascular Exam: REGULAR RHYTHM, RRR, +S1, +S2.  absent: Murmur





- GI/Abdominal Exam


GI & Abdominal Exam: Soft, Normal Bowel Sounds.  absent: Tenderness





- Extremities Exam


Extremities Exam: Full ROM, Normal Capillary Refill, Normal Inspection.  absent

: Joint Swelling, Pedal Edema





- Back Exam


Back Exam: NORMAL INSPECTION





- Neurological Exam


Neurological Exam: Alert, Awake, CN II-XII Intact, Normal Gait, Oriented x3





- Psychiatric Exam


Psychiatric exam: Normal Affect, Normal Mood





- Skin


Skin Exam: Dry, Intact, Normal Color, Warm





Assessment and Plan


(1) DVT prophylaxis


Status: Chronic   





(2) Scrotal edema


Status: Chronic   





(3) CAD (coronary artery disease)


Status: Chronic   





(4) Smoking


Status: Chronic   





(5) Low back pain


Status: Chronic   





(6) Prediabetes


Status: Chronic   





(7) Neurocognitive disorder


Status: Chronic

## 2017-12-30 RX ADMIN — DIVALPROEX SODIUM SCH MG: 250 TABLET, DELAYED RELEASE ORAL at 08:32

## 2017-12-30 RX ADMIN — DIVALPROEX SODIUM SCH MG: 250 TABLET, DELAYED RELEASE ORAL at 16:28

## 2017-12-30 NOTE — CP.PCM.PN
Subjective





- Date & Time of Evaluation


Date of Evaluation: 12/30/17


Time of Evaluation: 13:39





- Subjective


Subjective: 





s/o no f/c, n/v/d. no other complaints.calm and cooperative. no abd pain, psych 

consult appriciated, 


a/p cont plan, cont placement, cont all medical management, cont psych meds


remains unable to make medical decisians for self. pending state disability/

guardianship/placement





Objective





- Vital Signs/Intake and Output


Vital Signs (last 24 hours): 


 











Temp Pulse Resp BP Pulse Ox


 


 97.2 F L  69   19   101/68   98 


 


 12/30/17 07:43  12/30/17 08:31  12/30/17 07:43  12/30/17 08:31  12/30/17 07:43











- Medications


Medications: 


 Current Medications





Atorvastatin Calcium (Lipitor)  20 mg PO HS [facility]


   Last Admin: 12/29/17 21:13 Dose:  20 mg


Divalproex Sodium (Depakote Dr(*Bid*))  500 mg PO BID [facility]


   Last Admin: 12/30/17 08:32 Dose:  500 mg


Enalapril Maleate (Vasotec)  10 mg PO DAILY [facility]


   Last Admin: 12/30/17 08:32 Dose:  10 mg


Fenofibrate (Tricor)  145 mg PO DAILY [facility]


   Last Admin: 12/30/17 08:32 Dose:  145 mg


Haloperidol (Haldol)  1 mg PO Q8 PRN


   PRN Reason: Agitation


Haloperidol Lactate (Haldol)  1 mg IM Q8 PRN


   PRN Reason: Agitation


Lorazepam (Ativan)  0.5 mg PO Q8 PRN


   PRN Reason: Agitation


Metoprolol Tartrate (Lopressor)  50 mg PO Q12 [facility]


   Last Admin: 12/30/17 08:31 Dose:  50 mg


Quetiapine Fumarate (Seroquel)  50 mg PO Q12 [facility]


   Last Admin: 12/30/17 08:32 Dose:  50 mg











- Labs


Labs: 


 





 12/19/17 05:45 





 12/19/17 05:45 





 











PT  11.2 SECONDS (9.4-12.0)   12/14/15  05:20    


 


INR  1.05  (0.89-1.20)   12/14/15  05:20    


 


APTT  36.2 SECONDS (23.1-32.0)  H  12/14/15  05:20    














- Constitutional


Appears: Well, Non-toxic, No Acute Distress





- Head Exam


Head Exam: ATRAUMATIC, NORMAL INSPECTION, NORMOCEPHALIC





- Eye Exam


Eye Exam: EOMI, Normal appearance, PERRL


Pupil Exam: NORMAL ACCOMODATION, PERRL





- ENT Exam


ENT Exam: Mucous Membranes Moist, Normal Exam





- Neck Exam


Neck Exam: Full ROM, Normal Inspection.  absent: Lymphadenopathy





- Respiratory Exam


Respiratory Exam: Clear to Ausculation Bilateral, NORMAL BREATHING PATTERN





- Cardiovascular Exam


Cardiovascular Exam: REGULAR RHYTHM, RRR, +S1, +S2.  absent: Murmur





- GI/Abdominal Exam


GI & Abdominal Exam: Soft, Normal Bowel Sounds.  absent: Tenderness





- Extremities Exam


Extremities Exam: Full ROM, Normal Capillary Refill, Normal Inspection.  absent

: Joint Swelling, Pedal Edema





- Back Exam


Back Exam: NORMAL INSPECTION





- Neurological Exam


Neurological Exam: Alert, Awake, CN II-XII Intact, Normal Gait, Oriented x3





- Psychiatric Exam


Psychiatric exam: Normal Affect, Normal Mood





- Skin


Skin Exam: Dry, Intact, Normal Color, Warm





Assessment and Plan


(1) DVT prophylaxis


Status: Chronic   





(2) Scrotal edema


Status: Chronic   





(3) CAD (coronary artery disease)


Status: Chronic   





(4) Smoking


Status: Chronic   





(5) Low back pain


Status: Chronic   





(6) Prediabetes


Status: Chronic   





(7) Neurocognitive disorder


Status: Chronic

## 2017-12-31 RX ADMIN — DIVALPROEX SODIUM SCH MG: 250 TABLET, DELAYED RELEASE ORAL at 08:43

## 2017-12-31 RX ADMIN — DIVALPROEX SODIUM SCH MG: 250 TABLET, DELAYED RELEASE ORAL at 16:18

## 2018-01-01 RX ADMIN — DIVALPROEX SODIUM SCH MG: 250 TABLET, DELAYED RELEASE ORAL at 16:24

## 2018-01-01 RX ADMIN — DIVALPROEX SODIUM SCH MG: 250 TABLET, DELAYED RELEASE ORAL at 09:28

## 2018-01-01 NOTE — CP.PCM.PN
Subjective





- Date & Time of Evaluation


Date of Evaluation: 01/01/18


Time of Evaluation: 11:56





- Subjective


Subjective: 





s/o no f/c, n/v/d. no other complaints.calm and cooperative. no abd pain, psych 

consult appriciated, 


a/p cont plan, cont placement, cont all medical management, cont psych meds


remains unable to make medical decisians for self. pending state disability/

guardianship/placement








Objective





- Vital Signs/Intake and Output


Vital Signs (last 24 hours): 


 











Temp Pulse Resp BP Pulse Ox


 


 97.6 F   64   20   92/59 L  97 


 


 01/01/18 08:05  01/01/18 08:05  01/01/18 08:05  01/01/18 09:27  01/01/18 08:05











- Medications


Medications: 


 Current Medications





Atorvastatin Calcium (Lipitor)  20 mg PO HS [facility]


   Last Admin: 12/31/17 21:11 Dose:  20 mg


Divalproex Sodium (Depakote Dr(*Bid*))  500 mg PO BID [facility]


   Last Admin: 01/01/18 09:28 Dose:  500 mg


Enalapril Maleate (Vasotec)  10 mg PO DAILY [facility]


   Last Admin: 01/01/18 09:28 Dose:  10 mg


Fenofibrate (Tricor)  145 mg PO DAILY [facility]


   Last Admin: 01/01/18 09:28 Dose:  145 mg


Haloperidol (Haldol)  1 mg PO Q8 PRN


   PRN Reason: Agitation


   Last Admin: 12/31/17 08:43 Dose:  1 mg


Haloperidol Lactate (Haldol)  1 mg IM Q8 PRN


   PRN Reason: Agitation


Lorazepam (Ativan)  0.5 mg PO Q8 PRN


   PRN Reason: Agitation


Metoprolol Tartrate (Lopressor)  50 mg PO Q12 [facility]


   Last Admin: 01/01/18 09:27 Dose:  Not Given


Quetiapine Fumarate (Seroquel)  50 mg PO Q12 [facility]


   Last Admin: 01/01/18 09:28 Dose:  50 mg











- Labs


Labs: 


 





 12/19/17 05:45 





 12/19/17 05:45 





 











PT  11.2 SECONDS (9.4-12.0)   12/14/15  05:20    


 


INR  1.05  (0.89-1.20)   12/14/15  05:20    


 


APTT  36.2 SECONDS (23.1-32.0)  H  12/14/15  05:20    














- Constitutional


Appears: Well, Non-toxic, No Acute Distress





- Head Exam


Head Exam: ATRAUMATIC, NORMAL INSPECTION, NORMOCEPHALIC





- Eye Exam


Eye Exam: EOMI, Normal appearance, PERRL


Pupil Exam: NORMAL ACCOMODATION, PERRL





- ENT Exam


ENT Exam: Mucous Membranes Moist, Normal Exam





- Neck Exam


Neck Exam: Full ROM, Normal Inspection.  absent: Lymphadenopathy





- Respiratory Exam


Respiratory Exam: Clear to Ausculation Bilateral, NORMAL BREATHING PATTERN





- Cardiovascular Exam


Cardiovascular Exam: REGULAR RHYTHM, RRR, +S1, +S2.  absent: Murmur





- GI/Abdominal Exam


GI & Abdominal Exam: Soft, Normal Bowel Sounds.  absent: Tenderness





- Extremities Exam


Extremities Exam: Full ROM, Normal Capillary Refill, Normal Inspection.  absent

: Joint Swelling, Pedal Edema





- Back Exam


Back Exam: NORMAL INSPECTION





- Neurological Exam


Neurological Exam: Alert, Awake, CN II-XII Intact, Normal Gait, Oriented x3





- Psychiatric Exam


Psychiatric exam: Normal Affect, Normal Mood





- Skin


Skin Exam: Dry, Intact, Normal Color, Warm





Assessment and Plan


(1) DVT prophylaxis


Status: Chronic   





(2) Scrotal edema


Status: Chronic   





(3) CAD (coronary artery disease)


Status: Chronic   





(4) Smoking


Status: Chronic   





(5) Low back pain


Status: Chronic   





(6) Prediabetes


Status: Chronic   





(7) Neurocognitive disorder


Status: Chronic

## 2018-01-01 NOTE — CP.PCM.PN
Subjective





- Date & Time of Evaluation


Date of Evaluation: 12/31/17


Time of Evaluation: 11:55





- Subjective


Subjective: 





s/o no f/c, n/v/d. no other complaints.calm and cooperative


a/p cont plan, cont placement, cont all medical management, cont psych meds


remains unable to make medical decisians for self. pending state disability/

guardianship/placement








Objective





- Vital Signs/Intake and Output


Vital Signs (last 24 hours): 


 











Temp Pulse Resp BP Pulse Ox


 


 97.6 F   64   20   92/59 L  97 


 


 01/01/18 08:05  01/01/18 08:05  01/01/18 08:05  01/01/18 09:27  01/01/18 08:05











- Medications


Medications: 


 Current Medications





Atorvastatin Calcium (Lipitor)  20 mg PO HS [facility]


   Last Admin: 12/31/17 21:11 Dose:  20 mg


Divalproex Sodium (Depakote Dr(*Bid*))  500 mg PO BID [facility]


   Last Admin: 01/01/18 09:28 Dose:  500 mg


Enalapril Maleate (Vasotec)  10 mg PO DAILY [facility]


   Last Admin: 01/01/18 09:28 Dose:  10 mg


Fenofibrate (Tricor)  145 mg PO DAILY [facility]


   Last Admin: 01/01/18 09:28 Dose:  145 mg


Haloperidol (Haldol)  1 mg PO Q8 PRN


   PRN Reason: Agitation


   Last Admin: 12/31/17 08:43 Dose:  1 mg


Haloperidol Lactate (Haldol)  1 mg IM Q8 PRN


   PRN Reason: Agitation


Lorazepam (Ativan)  0.5 mg PO Q8 PRN


   PRN Reason: Agitation


Metoprolol Tartrate (Lopressor)  50 mg PO Q12 [facility]


   Last Admin: 01/01/18 09:27 Dose:  Not Given


Quetiapine Fumarate (Seroquel)  50 mg PO Q12 [facility]


   Last Admin: 01/01/18 09:28 Dose:  50 mg











- Labs


Labs: 


 





 12/19/17 05:45 





 12/19/17 05:45 





 











PT  11.2 SECONDS (9.4-12.0)   12/14/15  05:20    


 


INR  1.05  (0.89-1.20)   12/14/15  05:20    


 


APTT  36.2 SECONDS (23.1-32.0)  H  12/14/15  05:20    














Assessment and Plan


(1) DVT prophylaxis


Status: Chronic   





(2) Scrotal edema


Status: Chronic   





(3) CAD (coronary artery disease)


Status: Chronic   





(4) Smoking


Status: Chronic   





(5) Low back pain


Status: Chronic   





(6) Prediabetes


Status: Chronic   





(7) Neurocognitive disorder


Status: Chronic

## 2018-01-02 RX ADMIN — DIVALPROEX SODIUM SCH MG: 250 TABLET, DELAYED RELEASE ORAL at 08:14

## 2018-01-02 RX ADMIN — DIVALPROEX SODIUM SCH MG: 250 TABLET, DELAYED RELEASE ORAL at 16:30

## 2018-01-02 NOTE — CP.PCM.PN
Subjective





- Date & Time of Evaluation


Date of Evaluation: 01/02/18


Time of Evaluation: 07:24





- Subjective


Subjective: 





s/o no f/c, n/v/d. no other complaints.calm and cooperative. no abd pain, psych 

consult appriciated, 


a/p cont plan, cont placement, cont all medical management, cont psych meds


remains unable to make medical decisians for self. pending state disability/

guardianship/placement








Objective





- Vital Signs/Intake and Output


Vital Signs (last 24 hours): 


 











Temp Pulse Resp BP Pulse Ox


 


 98.1 F   79   20   106/68   96 


 


 01/01/18 23:39  01/01/18 23:39  01/01/18 23:39  01/01/18 23:39  01/01/18 23:39











- Medications


Medications: 


 Current Medications





Atorvastatin Calcium (Lipitor)  20 mg PO HS [facility]


   Last Admin: 01/01/18 21:06 Dose:  20 mg


Divalproex Sodium (Depakote Dr(*Bid*))  500 mg PO BID [facility]


   Last Admin: 01/01/18 16:24 Dose:  500 mg


Enalapril Maleate (Vasotec)  10 mg PO DAILY [facility]


   Last Admin: 01/01/18 09:28 Dose:  10 mg


Fenofibrate (Tricor)  145 mg PO DAILY [facility]


   Last Admin: 01/01/18 09:28 Dose:  145 mg


Haloperidol (Haldol)  1 mg PO Q8 PRN


   PRN Reason: Agitation


   Last Admin: 12/31/17 08:43 Dose:  1 mg


Haloperidol Lactate (Haldol)  1 mg IM Q8 PRN


   PRN Reason: Agitation


Lorazepam (Ativan)  0.5 mg PO Q8 PRN


   PRN Reason: Agitation


Metoprolol Tartrate (Lopressor)  50 mg PO Q12 [facility]


   Last Admin: 01/01/18 21:06 Dose:  50 mg


Quetiapine Fumarate (Seroquel)  50 mg PO Q12 [facility]


   Last Admin: 01/01/18 21:05 Dose:  50 mg











- Labs


Labs: 


 





 12/19/17 05:45 





 12/19/17 05:45 





 











PT  11.2 SECONDS (9.4-12.0)   12/14/15  05:20    


 


INR  1.05  (0.89-1.20)   12/14/15  05:20    


 


APTT  36.2 SECONDS (23.1-32.0)  H  12/14/15  05:20    














- Constitutional


Appears: Well, Non-toxic, No Acute Distress





- Head Exam


Head Exam: ATRAUMATIC, NORMAL INSPECTION, NORMOCEPHALIC





- Eye Exam


Eye Exam: EOMI, Normal appearance, PERRL


Pupil Exam: NORMAL ACCOMODATION, PERRL





- ENT Exam


ENT Exam: Mucous Membranes Moist, Normal Exam





- Neck Exam


Neck Exam: Full ROM, Normal Inspection.  absent: Lymphadenopathy





- Respiratory Exam


Respiratory Exam: Clear to Ausculation Bilateral, NORMAL BREATHING PATTERN





- Cardiovascular Exam


Cardiovascular Exam: REGULAR RHYTHM, RRR, +S1, +S2.  absent: Murmur





- GI/Abdominal Exam


GI & Abdominal Exam: Soft, Normal Bowel Sounds.  absent: Tenderness





- Extremities Exam


Extremities Exam: Full ROM, Normal Capillary Refill, Normal Inspection.  absent

: Joint Swelling, Pedal Edema





- Back Exam


Back Exam: NORMAL INSPECTION





- Neurological Exam


Neurological Exam: Alert, Awake, CN II-XII Intact, Normal Gait, Oriented x3





- Psychiatric Exam


Psychiatric exam: Normal Affect, Normal Mood





- Skin


Skin Exam: Dry, Intact, Normal Color, Warm





Assessment and Plan


(1) DVT prophylaxis


Status: Chronic   





(2) Scrotal edema


Status: Chronic   





(3) CAD (coronary artery disease)


Status: Chronic   





(4) Smoking


Status: Chronic   





(5) Low back pain


Status: Chronic   





(6) Prediabetes


Status: Chronic   





(7) Neurocognitive disorder


Status: Chronic

## 2018-01-03 RX ADMIN — DIVALPROEX SODIUM SCH MG: 250 TABLET, DELAYED RELEASE ORAL at 08:39

## 2018-01-03 RX ADMIN — DIVALPROEX SODIUM SCH MG: 250 TABLET, DELAYED RELEASE ORAL at 16:11

## 2018-01-03 NOTE — CP.PCM.PN
Subjective





- Date & Time of Evaluation


Date of Evaluation: 01/03/18


Time of Evaluation: 08:21





- Subjective


Subjective: 





s/o no f/c, n/v/d. no other complaints.calm and cooperative. no abd pain, psych 

consult appriciated, 


a/p cont plan, cont placement, cont all medical management, cont psych meds


remains unable to make medical decisians for self. pending state disability/

guardianship/placement





Objective





- Vital Signs/Intake and Output


Vital Signs (last 24 hours): 


 











Temp Pulse Resp BP Pulse Ox


 


 98.3 F   76   19   102/66   95 


 


 01/03/18 00:18  01/03/18 00:18  01/03/18 00:18  01/03/18 00:18  01/03/18 00:18











- Medications


Medications: 


 Current Medications





Atorvastatin Calcium (Lipitor)  20 mg PO HS [facility]


   Last Admin: 01/02/18 21:16 Dose:  20 mg


Divalproex Sodium (Depakote Dr(*Bid*))  500 mg PO BID [facility]


   Last Admin: 01/02/18 16:30 Dose:  500 mg


Enalapril Maleate (Vasotec)  10 mg PO DAILY [facility]


   Last Admin: 01/02/18 08:14 Dose:  10 mg


Fenofibrate (Tricor)  145 mg PO DAILY [facility]


   Last Admin: 01/02/18 08:14 Dose:  145 mg


Haloperidol (Haldol)  1 mg PO Q8 PRN


   PRN Reason: Agitation


   Last Admin: 12/31/17 08:43 Dose:  1 mg


Haloperidol Lactate (Haldol)  1 mg IM Q8 PRN


   PRN Reason: Agitation


Lorazepam (Ativan)  0.5 mg PO Q8 PRN


   PRN Reason: Agitation


Metoprolol Tartrate (Lopressor)  50 mg PO Q12 [facility]


   Last Admin: 01/02/18 21:16 Dose:  50 mg


Quetiapine Fumarate (Seroquel)  50 mg PO Q12 [facility]


   Last Admin: 01/02/18 21:16 Dose:  50 mg











- Labs


Labs: 


 





 12/19/17 05:45 





 12/19/17 05:45 





 











PT  11.2 SECONDS (9.4-12.0)   12/14/15  05:20    


 


INR  1.05  (0.89-1.20)   12/14/15  05:20    


 


APTT  36.2 SECONDS (23.1-32.0)  H  12/14/15  05:20    














- Constitutional


Appears: Well, Non-toxic, No Acute Distress





- Head Exam


Head Exam: ATRAUMATIC, NORMAL INSPECTION, NORMOCEPHALIC





- Eye Exam


Eye Exam: EOMI, Normal appearance, PERRL


Pupil Exam: NORMAL ACCOMODATION, PERRL





- ENT Exam


ENT Exam: Mucous Membranes Moist, Normal Exam





- Neck Exam


Neck Exam: Full ROM, Normal Inspection.  absent: Lymphadenopathy





- Respiratory Exam


Respiratory Exam: Clear to Ausculation Bilateral, NORMAL BREATHING PATTERN





- Cardiovascular Exam


Cardiovascular Exam: REGULAR RHYTHM, RRR, +S1, +S2.  absent: Murmur





- GI/Abdominal Exam


GI & Abdominal Exam: Soft, Normal Bowel Sounds.  absent: Tenderness





- Extremities Exam


Extremities Exam: Full ROM, Normal Capillary Refill, Normal Inspection.  absent

: Joint Swelling, Pedal Edema





- Back Exam


Back Exam: NORMAL INSPECTION





- Neurological Exam


Neurological Exam: Alert, Awake, CN II-XII Intact, Normal Gait, Oriented x3





- Psychiatric Exam


Psychiatric exam: Normal Affect, Normal Mood





- Skin


Skin Exam: Dry, Intact, Normal Color, Warm





Assessment and Plan


(1) DVT prophylaxis


Status: Chronic   





(2) Scrotal edema


Status: Chronic   





(3) CAD (coronary artery disease)


Status: Chronic   





(4) Smoking


Status: Chronic   





(5) Low back pain


Status: Chronic   





(6) Prediabetes


Status: Chronic   





(7) Neurocognitive disorder


Status: Chronic

## 2018-01-04 RX ADMIN — DIVALPROEX SODIUM SCH MG: 250 TABLET, DELAYED RELEASE ORAL at 19:08

## 2018-01-04 RX ADMIN — DIVALPROEX SODIUM SCH MG: 250 TABLET, DELAYED RELEASE ORAL at 09:11

## 2018-01-05 RX ADMIN — DIVALPROEX SODIUM SCH MG: 250 TABLET, DELAYED RELEASE ORAL at 17:05

## 2018-01-05 RX ADMIN — DIVALPROEX SODIUM SCH MG: 250 TABLET, DELAYED RELEASE ORAL at 09:17

## 2018-01-05 NOTE — CP.PCM.PN
Subjective





- Date & Time of Evaluation


Date of Evaluation: 01/05/18


Time of Evaluation: 08:56





- Subjective


Subjective: 





s/o no f/c, n/v/d. no other complaints.calm and cooperative. no abd pain, 


a/p cont plan, cont placement, cont all medical management, cont psych meds


remains unable to make medical decisians for self. pending state disability/

guardianship/placement





Objective





- Vital Signs/Intake and Output


Vital Signs (last 24 hours): 


 











Temp Pulse Resp BP Pulse Ox


 


 97.6 F   67   18   100/65   99 


 


 01/05/18 08:53  01/05/18 08:53  01/05/18 08:53  01/05/18 08:53  01/05/18 08:53











- Medications


Medications: 


 Current Medications





Atorvastatin Calcium (Lipitor)  20 mg PO HS [facility]


   Last Admin: 01/04/18 21:18 Dose:  20 mg


Divalproex Sodium (Depakote Dr(*Bid*))  500 mg PO BID [facility]


   Last Admin: 01/04/18 19:08 Dose:  500 mg


Enalapril Maleate (Vasotec)  10 mg PO DAILY [facility]


   Last Admin: 01/04/18 09:15 Dose:  Not Given


Fenofibrate (Tricor)  145 mg PO DAILY [facility]


   Last Admin: 01/04/18 09:15 Dose:  145 mg


Haloperidol (Haldol)  1 mg PO Q8 PRN


   PRN Reason: Agitation


   Last Admin: 12/31/17 08:43 Dose:  1 mg


Haloperidol Lactate (Haldol)  1 mg IM Q8 PRN


   PRN Reason: Agitation


Metoprolol Tartrate (Lopressor)  50 mg PO Q12 [facility]


   Last Admin: 01/04/18 21:15 Dose:  50 mg


Quetiapine Fumarate (Seroquel)  50 mg PO Q12 [facility]


   Last Admin: 01/04/18 21:15 Dose:  50 mg











- Labs


Labs: 


 





 12/19/17 05:45 





 12/19/17 05:45 





 











PT  11.2 SECONDS (9.4-12.0)   12/14/15  05:20    


 


INR  1.05  (0.89-1.20)   12/14/15  05:20    


 


APTT  36.2 SECONDS (23.1-32.0)  H  12/14/15  05:20    














- Constitutional


Appears: Well, Non-toxic, No Acute Distress





- Head Exam


Head Exam: ATRAUMATIC, NORMAL INSPECTION, NORMOCEPHALIC





- Eye Exam


Eye Exam: EOMI, Normal appearance, PERRL


Pupil Exam: NORMAL ACCOMODATION, PERRL





- ENT Exam


ENT Exam: Mucous Membranes Moist, Normal Exam





- Neck Exam


Neck Exam: Full ROM, Normal Inspection.  absent: Lymphadenopathy





- Respiratory Exam


Respiratory Exam: Clear to Ausculation Bilateral, NORMAL BREATHING PATTERN





- Cardiovascular Exam


Cardiovascular Exam: REGULAR RHYTHM, RRR, +S1, +S2.  absent: Murmur





- GI/Abdominal Exam


GI & Abdominal Exam: Soft, Normal Bowel Sounds.  absent: Tenderness





- Extremities Exam


Extremities Exam: Full ROM, Normal Capillary Refill, Normal Inspection.  absent

: Joint Swelling, Pedal Edema





- Back Exam


Back Exam: NORMAL INSPECTION





- Neurological Exam


Neurological Exam: Alert, Awake, CN II-XII Intact, Normal Gait, Oriented x3





- Psychiatric Exam


Psychiatric exam: Normal Affect, Normal Mood





- Skin


Skin Exam: Dry, Intact, Normal Color, Warm





Assessment and Plan


(1) DVT prophylaxis


Status: Chronic   





(2) Scrotal edema


Status: Chronic   





(3) CAD (coronary artery disease)


Status: Chronic   





(4) Smoking


Status: Chronic   





(5) Low back pain


Status: Chronic   





(6) Prediabetes


Status: Chronic   





(7) Neurocognitive disorder


Status: Chronic

## 2018-01-06 RX ADMIN — DIVALPROEX SODIUM SCH MG: 250 TABLET, DELAYED RELEASE ORAL at 16:05

## 2018-01-06 RX ADMIN — DIVALPROEX SODIUM SCH MG: 250 TABLET, DELAYED RELEASE ORAL at 08:31

## 2018-01-06 NOTE — CP.PCM.PN
Subjective





- Date & Time of Evaluation


Date of Evaluation: 01/06/18


Time of Evaluation: 07:20





- Subjective


Subjective: 





s/o no f/c, n/v/d. no other complaints.calm and cooperative. no abd pain, 


a/p cont plan, cont placement, cont all medical management, cont psych meds


remains unable to make medical decisians for self. pending state disability/

guardianship/placement





Objective





- Vital Signs/Intake and Output


Vital Signs (last 24 hours): 


 











Temp Pulse Resp BP Pulse Ox


 


 98.2 F   73   19   95/63 L  97 


 


 01/06/18 00:00  01/06/18 00:00  01/06/18 00:00  01/06/18 00:00  01/06/18 00:00











- Medications


Medications: 


 Current Medications





Atorvastatin Calcium (Lipitor)  20 mg PO HS [facility]


   Last Admin: 01/05/18 22:00 Dose:  20 mg


Divalproex Sodium (Depakote Dr(*Bid*))  500 mg PO BID [facility]


   Last Admin: 01/05/18 17:05 Dose:  500 mg


Enalapril Maleate (Vasotec)  10 mg PO DAILY [facility]


   Last Admin: 01/05/18 09:18 Dose:  10 mg


Fenofibrate (Tricor)  145 mg PO DAILY [facility]


   Last Admin: 01/05/18 09:18 Dose:  145 mg


Haloperidol (Haldol)  1 mg PO Q8 PRN


   PRN Reason: Agitation


   Last Admin: 12/31/17 08:43 Dose:  1 mg


Haloperidol Lactate (Haldol)  1 mg IM Q8 PRN


   PRN Reason: Agitation


Metoprolol Tartrate (Lopressor)  50 mg PO Q12 [facility]


   Last Admin: 01/05/18 22:02 Dose:  Not Given


Quetiapine Fumarate (Seroquel)  50 mg PO Q12 [facility]


   Last Admin: 01/05/18 21:55 Dose:  50 mg











- Labs


Labs: 


 





 12/19/17 05:45 





 12/19/17 05:45 





 











PT  11.2 SECONDS (9.4-12.0)   12/14/15  05:20    


 


INR  1.05  (0.89-1.20)   12/14/15  05:20    


 


APTT  36.2 SECONDS (23.1-32.0)  H  12/14/15  05:20    














- Constitutional


Appears: Well, Non-toxic, No Acute Distress





- Head Exam


Head Exam: ATRAUMATIC, NORMAL INSPECTION, NORMOCEPHALIC





- Eye Exam


Eye Exam: EOMI, Normal appearance, PERRL


Pupil Exam: NORMAL ACCOMODATION, PERRL





- ENT Exam


ENT Exam: Mucous Membranes Moist, Normal Exam





- Neck Exam


Neck Exam: Full ROM, Normal Inspection.  absent: Lymphadenopathy





- Respiratory Exam


Respiratory Exam: Clear to Ausculation Bilateral, NORMAL BREATHING PATTERN





- Cardiovascular Exam


Cardiovascular Exam: REGULAR RHYTHM, RRR, +S1, +S2.  absent: Murmur





- GI/Abdominal Exam


GI & Abdominal Exam: Soft, Normal Bowel Sounds.  absent: Tenderness





- Extremities Exam


Extremities Exam: Full ROM, Normal Capillary Refill, Normal Inspection.  absent

: Joint Swelling, Pedal Edema





- Back Exam


Back Exam: NORMAL INSPECTION





- Neurological Exam


Neurological Exam: Alert, Awake, CN II-XII Intact, Normal Gait, Oriented x3





- Psychiatric Exam


Psychiatric exam: Normal Affect, Normal Mood





- Skin


Skin Exam: Dry, Intact, Normal Color, Warm





Assessment and Plan


(1) DVT prophylaxis


Status: Chronic   





(2) Scrotal edema


Status: Chronic   





(3) CAD (coronary artery disease)


Status: Chronic   





(4) Smoking


Status: Chronic   





(5) Low back pain


Status: Chronic   





(6) Prediabetes


Status: Chronic   





(7) Neurocognitive disorder


Status: Chronic

## 2018-01-07 RX ADMIN — DIVALPROEX SODIUM SCH MG: 250 TABLET, DELAYED RELEASE ORAL at 08:32

## 2018-01-07 RX ADMIN — DIVALPROEX SODIUM SCH MG: 250 TABLET, DELAYED RELEASE ORAL at 16:35

## 2018-01-07 NOTE — CP.PCM.PN
Subjective





- Date & Time of Evaluation


Date of Evaluation: 01/07/18


Time of Evaluation: 08:13





- Subjective


Subjective: 





s/o no f/c, n/v/d. no other complaints.calm and cooperative. no abd pain, 


a/p cont plan, cont placement, cont all medical management, cont psych meds


remains unable to make medical decisians for self. pending state disability/

guardianship/placement





Objective





- Vital Signs/Intake and Output


Vital Signs (last 24 hours): 


 











Temp Pulse Resp BP Pulse Ox


 


 97.7 F   66   20   96/64 L  96 


 


 01/07/18 07:59  01/07/18 07:59  01/07/18 07:59  01/07/18 07:59  01/07/18 07:59











- Medications


Medications: 


 Current Medications





Atorvastatin Calcium (Lipitor)  20 mg PO HS [facility]


   Last Admin: 01/06/18 21:01 Dose:  20 mg


Divalproex Sodium (Depakote Dr(*Bid*))  500 mg PO BID [facility]


   Last Admin: 01/06/18 16:05 Dose:  500 mg


Enalapril Maleate (Vasotec)  10 mg PO DAILY [facility]


   Last Admin: 01/06/18 08:32 Dose:  10 mg


Fenofibrate (Tricor)  145 mg PO DAILY [facility]


   Last Admin: 01/06/18 08:32 Dose:  145 mg


Haloperidol (Haldol)  1 mg PO Q8 PRN


   PRN Reason: Agitation


   Last Admin: 12/31/17 08:43 Dose:  1 mg


Haloperidol Lactate (Haldol)  1 mg IM Q8 PRN


   PRN Reason: Agitation


Metoprolol Tartrate (Lopressor)  50 mg PO Q12 [facility]


   Last Admin: 01/06/18 20:57 Dose:  50 mg


Quetiapine Fumarate (Seroquel)  50 mg PO Q12 [facility]


   Last Admin: 01/06/18 20:57 Dose:  50 mg











- Labs


Labs: 


 





 12/19/17 05:45 





 12/19/17 05:45 





 











PT  11.2 SECONDS (9.4-12.0)   12/14/15  05:20    


 


INR  1.05  (0.89-1.20)   12/14/15  05:20    


 


APTT  36.2 SECONDS (23.1-32.0)  H  12/14/15  05:20    














- Constitutional


Appears: Well, Non-toxic, No Acute Distress





- Head Exam


Head Exam: ATRAUMATIC, NORMAL INSPECTION, NORMOCEPHALIC





- Eye Exam


Eye Exam: EOMI, Normal appearance, PERRL


Pupil Exam: NORMAL ACCOMODATION, PERRL





- ENT Exam


ENT Exam: Mucous Membranes Moist, Normal Exam





- Neck Exam


Neck Exam: Full ROM, Normal Inspection.  absent: Lymphadenopathy





- Respiratory Exam


Respiratory Exam: Clear to Ausculation Bilateral, NORMAL BREATHING PATTERN





- Cardiovascular Exam


Cardiovascular Exam: REGULAR RHYTHM, RRR, +S1, +S2.  absent: Murmur





- GI/Abdominal Exam


GI & Abdominal Exam: Soft, Normal Bowel Sounds.  absent: Tenderness





- Extremities Exam


Extremities Exam: Full ROM, Normal Capillary Refill, Normal Inspection.  absent

: Joint Swelling, Pedal Edema





- Back Exam


Back Exam: NORMAL INSPECTION





- Neurological Exam


Neurological Exam: Alert, Awake, CN II-XII Intact, Normal Gait, Oriented x3





- Psychiatric Exam


Psychiatric exam: Normal Affect, Normal Mood





- Skin


Skin Exam: Dry, Intact, Normal Color, Warm





Assessment and Plan


(1) DVT prophylaxis


Status: Chronic   





(2) Scrotal edema


Status: Chronic   





(3) CAD (coronary artery disease)


Status: Chronic   





(4) Smoking


Status: Chronic   





(5) Low back pain


Status: Chronic   





(6) Prediabetes


Status: Chronic   





(7) Neurocognitive disorder


Status: Chronic

## 2018-01-08 RX ADMIN — DIVALPROEX SODIUM SCH MG: 250 TABLET, DELAYED RELEASE ORAL at 16:27

## 2018-01-08 RX ADMIN — DIVALPROEX SODIUM SCH MG: 250 TABLET, DELAYED RELEASE ORAL at 08:19

## 2018-01-08 NOTE — CP.PCM.PN
Subjective





- Date & Time of Evaluation


Date of Evaluation: 01/08/18


Time of Evaluation: 07:54





- Subjective


Subjective: 





s/o no f/c, n/v/d. no other complaints.calm and cooperative. no abd pain, 


a/p cont plan, cont placement, cont all medical management, cont psych meds


remains unable to make medical decisians for self. pending state disability/

guardianship/placement





Objective





- Vital Signs/Intake and Output


Vital Signs (last 24 hours): 


 











Temp Pulse Resp BP Pulse Ox


 


 97.5 F L  65   20   112/75   97 


 


 01/08/18 07:42  01/08/18 07:42  01/08/18 07:42  01/08/18 07:42  01/08/18 07:42











- Medications


Medications: 


 Current Medications





Atorvastatin Calcium (Lipitor)  20 mg PO HS [facility]


   Last Admin: 01/07/18 21:49 Dose:  20 mg


Divalproex Sodium (Depakote Dr(*Bid*))  500 mg PO BID [facility]


   Last Admin: 01/07/18 16:35 Dose:  500 mg


Enalapril Maleate (Vasotec)  10 mg PO DAILY [facility]


   Last Admin: 01/07/18 08:34 Dose:  Not Given


Fenofibrate (Tricor)  145 mg PO DAILY [facility]


   Last Admin: 01/07/18 08:33 Dose:  145 mg


Haloperidol (Haldol)  1 mg PO Q8 PRN


   PRN Reason: Agitation


   Last Admin: 12/31/17 08:43 Dose:  1 mg


Haloperidol Lactate (Haldol)  1 mg IM Q8 PRN


   PRN Reason: Agitation


Metoprolol Tartrate (Lopressor)  50 mg PO Q12 [facility]


   Last Admin: 01/07/18 21:49 Dose:  50 mg


Quetiapine Fumarate (Seroquel)  50 mg PO Q12 [facility]


   Last Admin: 01/07/18 21:49 Dose:  50 mg











- Labs


Labs: 


 





 12/19/17 05:45 





 12/19/17 05:45 





 











PT  11.2 SECONDS (9.4-12.0)   12/14/15  05:20    


 


INR  1.05  (0.89-1.20)   12/14/15  05:20    


 


APTT  36.2 SECONDS (23.1-32.0)  H  12/14/15  05:20    














- Constitutional


Appears: Well, Non-toxic, No Acute Distress





- Head Exam


Head Exam: ATRAUMATIC, NORMAL INSPECTION, NORMOCEPHALIC





- Eye Exam


Eye Exam: EOMI, Normal appearance, PERRL


Pupil Exam: NORMAL ACCOMODATION, PERRL





- ENT Exam


ENT Exam: Mucous Membranes Moist, Normal Exam





- Neck Exam


Neck Exam: Full ROM, Normal Inspection.  absent: Lymphadenopathy





- Respiratory Exam


Respiratory Exam: Clear to Ausculation Bilateral, NORMAL BREATHING PATTERN





- Cardiovascular Exam


Cardiovascular Exam: REGULAR RHYTHM, RRR, +S1, +S2.  absent: Murmur





- GI/Abdominal Exam


GI & Abdominal Exam: Soft, Normal Bowel Sounds.  absent: Tenderness





- Extremities Exam


Extremities Exam: Full ROM, Normal Capillary Refill, Normal Inspection.  absent

: Joint Swelling, Pedal Edema





- Back Exam


Back Exam: NORMAL INSPECTION





- Neurological Exam


Neurological Exam: Alert, Awake, CN II-XII Intact, Normal Gait, Oriented x3





- Psychiatric Exam


Psychiatric exam: Normal Affect, Normal Mood





- Skin


Skin Exam: Dry, Intact, Normal Color, Warm





Assessment and Plan


(1) DVT prophylaxis


Status: Chronic   





(2) Scrotal edema


Status: Chronic   





(3) CAD (coronary artery disease)


Status: Chronic   





(4) Smoking


Status: Chronic   





(5) Low back pain


Status: Chronic   





(6) Prediabetes


Status: Chronic   





(7) Neurocognitive disorder


Status: Chronic

## 2018-01-09 RX ADMIN — DIVALPROEX SODIUM SCH MG: 250 TABLET, DELAYED RELEASE ORAL at 16:05

## 2018-01-09 RX ADMIN — DIVALPROEX SODIUM SCH MG: 250 TABLET, DELAYED RELEASE ORAL at 08:15

## 2018-01-09 NOTE — CP.PCM.PN
Subjective





- Date & Time of Evaluation


Date of Evaluation: 01/09/18


Time of Evaluation: 07:51





- Subjective


Subjective: 





s/o no f/c, n/v/d. no other complaints.calm and cooperative. no abd pain, 


a/p cont plan, cont placement, cont all medical management, cont psych meds


remains unable to make medical decisians for self. pending state disability/

guardianship/placement





Objective





- Vital Signs/Intake and Output


Vital Signs (last 24 hours): 


 











Temp Pulse Resp BP Pulse Ox


 


 97.9 F   67   19   123/75   96 


 


 01/09/18 00:00  01/09/18 00:00  01/09/18 00:00  01/09/18 00:00  01/09/18 00:00











- Medications


Medications: 


 Current Medications





Atorvastatin Calcium (Lipitor)  20 mg PO HS [facility]


   Last Admin: 01/08/18 21:42 Dose:  20 mg


Divalproex Sodium (Depakote Dr(*Bid*))  500 mg PO BID [facility]


   Last Admin: 01/08/18 16:27 Dose:  500 mg


Enalapril Maleate (Vasotec)  10 mg PO DAILY [facility]


   Last Admin: 01/08/18 08:19 Dose:  10 mg


Fenofibrate (Tricor)  145 mg PO DAILY [facility]


   Last Admin: 01/08/18 08:19 Dose:  145 mg


Haloperidol (Haldol)  1 mg PO Q8 PRN


   PRN Reason: Agitation


   Last Admin: 12/31/17 08:43 Dose:  1 mg


Haloperidol Lactate (Haldol)  1 mg IM Q8 PRN


   PRN Reason: Agitation


Metoprolol Tartrate (Lopressor)  50 mg PO Q12 [facility]


   Last Admin: 01/08/18 20:36 Dose:  Not Given


Quetiapine Fumarate (Seroquel)  50 mg PO Q12 [facility]


   Last Admin: 01/08/18 20:36 Dose:  50 mg











- Labs


Labs: 


 





 12/19/17 05:45 





 12/19/17 05:45 





 











PT  11.2 SECONDS (9.4-12.0)   12/14/15  05:20    


 


INR  1.05  (0.89-1.20)   12/14/15  05:20    


 


APTT  36.2 SECONDS (23.1-32.0)  H  12/14/15  05:20    














- Constitutional


Appears: Well, Non-toxic, No Acute Distress





- Head Exam


Head Exam: ATRAUMATIC, NORMAL INSPECTION, NORMOCEPHALIC





- Eye Exam


Eye Exam: EOMI, Normal appearance, PERRL


Pupil Exam: NORMAL ACCOMODATION, PERRL





- ENT Exam


ENT Exam: Mucous Membranes Moist, Normal Exam





- Neck Exam


Neck Exam: Full ROM, Normal Inspection.  absent: Lymphadenopathy





- Respiratory Exam


Respiratory Exam: Clear to Ausculation Bilateral, NORMAL BREATHING PATTERN





- Cardiovascular Exam


Cardiovascular Exam: REGULAR RHYTHM, RRR, +S1, +S2.  absent: Murmur





- GI/Abdominal Exam


GI & Abdominal Exam: Soft, Normal Bowel Sounds.  absent: Tenderness





- Extremities Exam


Extremities Exam: Full ROM, Normal Capillary Refill, Normal Inspection.  absent

: Joint Swelling, Pedal Edema





- Back Exam


Back Exam: NORMAL INSPECTION





- Neurological Exam


Neurological Exam: Alert, Awake, CN II-XII Intact, Normal Gait, Oriented x3





- Psychiatric Exam


Psychiatric exam: Normal Affect, Normal Mood





- Skin


Skin Exam: Dry, Intact, Normal Color, Warm





Assessment and Plan


(1) DVT prophylaxis


Status: Chronic   





(2) Scrotal edema


Status: Chronic   





(3) CAD (coronary artery disease)


Status: Chronic   





(4) Smoking


Status: Chronic   





(5) Low back pain


Status: Chronic   





(6) Prediabetes


Status: Chronic   





(7) Neurocognitive disorder


Status: Chronic

## 2018-01-10 RX ADMIN — DIVALPROEX SODIUM SCH MG: 250 TABLET, DELAYED RELEASE ORAL at 17:27

## 2018-01-10 RX ADMIN — DIVALPROEX SODIUM SCH MG: 250 TABLET, DELAYED RELEASE ORAL at 09:03

## 2018-01-10 NOTE — CP.PCM.PN
Subjective





- Date & Time of Evaluation


Date of Evaluation: 01/10/18


Time of Evaluation: 07:17





- Subjective


Subjective: 





s/o no f/c, n/v/d. no other complaints.calm and cooperative. no abd pain, 


a/p cont plan, cont placement, cont all medical management, cont psych meds


remains unable to make medical decisians for self. pending state disability/

guardianship/placement





Objective





- Vital Signs/Intake and Output


Vital Signs (last 24 hours): 


 











Temp Pulse Resp BP Pulse Ox


 


 97.9 F   75   18   104/69   96 


 


 01/10/18 00:09  01/10/18 00:09  01/10/18 00:09  01/10/18 00:09  01/10/18 00:09











- Medications


Medications: 


 Current Medications





Atorvastatin Calcium (Lipitor)  20 mg PO HS [facility]


   Last Admin: 01/09/18 21:00 Dose:  20 mg


Divalproex Sodium (Depakote Dr(*Bid*))  500 mg PO BID [facility]


   Last Admin: 01/09/18 16:05 Dose:  500 mg


Enalapril Maleate (Vasotec)  10 mg PO DAILY [facility]


   Last Admin: 01/09/18 08:16 Dose:  10 mg


Fenofibrate (Tricor)  145 mg PO DAILY [facility]


   Last Admin: 01/09/18 08:16 Dose:  145 mg


Haloperidol (Haldol)  1 mg PO Q8 PRN


   PRN Reason: Agitation


   Last Admin: 12/31/17 08:43 Dose:  1 mg


Haloperidol Lactate (Haldol)  1 mg IM Q8 PRN


   PRN Reason: Agitation


Metoprolol Tartrate (Lopressor)  50 mg PO Q12 [facility]


   Last Admin: 01/09/18 20:26 Dose:  50 mg


Quetiapine Fumarate (Seroquel)  50 mg PO Q12 [facility]


   Last Admin: 01/09/18 20:27 Dose:  50 mg











- Labs


Labs: 


 





 12/19/17 05:45 





 12/19/17 05:45 





 











PT  11.2 SECONDS (9.4-12.0)   12/14/15  05:20    


 


INR  1.05  (0.89-1.20)   12/14/15  05:20    


 


APTT  36.2 SECONDS (23.1-32.0)  H  12/14/15  05:20    














- Constitutional


Appears: Well, Non-toxic, No Acute Distress





- Head Exam


Head Exam: ATRAUMATIC, NORMAL INSPECTION, NORMOCEPHALIC





- Eye Exam


Eye Exam: EOMI, Normal appearance, PERRL


Pupil Exam: NORMAL ACCOMODATION, PERRL





- ENT Exam


ENT Exam: Mucous Membranes Moist, Normal Exam





- Neck Exam


Neck Exam: Full ROM, Normal Inspection.  absent: Lymphadenopathy





- Respiratory Exam


Respiratory Exam: Clear to Ausculation Bilateral, NORMAL BREATHING PATTERN





- Cardiovascular Exam


Cardiovascular Exam: REGULAR RHYTHM, RRR, +S1, +S2.  absent: Murmur





- GI/Abdominal Exam


GI & Abdominal Exam: Soft, Normal Bowel Sounds.  absent: Tenderness





- Extremities Exam


Extremities Exam: Full ROM, Normal Capillary Refill, Normal Inspection.  absent

: Joint Swelling, Pedal Edema





- Back Exam


Back Exam: NORMAL INSPECTION





- Neurological Exam


Neurological Exam: Alert, Awake, CN II-XII Intact, Normal Gait, Oriented x3





- Psychiatric Exam


Psychiatric exam: Normal Affect, Normal Mood





- Skin


Skin Exam: Dry, Intact, Normal Color, Warm





Assessment and Plan


(1) DVT prophylaxis


Status: Chronic   





(2) Scrotal edema


Status: Chronic   





(3) CAD (coronary artery disease)


Status: Chronic   





(4) Smoking


Status: Chronic   





(5) Low back pain


Status: Chronic   





(6) Prediabetes


Status: Chronic   





(7) Neurocognitive disorder


Status: Chronic

## 2018-01-11 RX ADMIN — DIVALPROEX SODIUM SCH MG: 250 TABLET, DELAYED RELEASE ORAL at 16:09

## 2018-01-11 RX ADMIN — DIVALPROEX SODIUM SCH MG: 250 TABLET, DELAYED RELEASE ORAL at 08:21

## 2018-01-11 NOTE — CP.PCM.PN
Subjective





- Date & Time of Evaluation


Date of Evaluation: 01/11/18


Time of Evaluation: 07:25





- Subjective


Subjective: 





s/o no f/c, n/v/d. no other complaints.calm and cooperative. no abd pain, 


a/p cont plan, cont placement, cont all medical management, cont psych meds


remains unable to make medical decisians for self. pending state disability/

guardianship/placement





Objective





- Vital Signs/Intake and Output


Vital Signs (last 24 hours): 


 











Temp Pulse Resp BP Pulse Ox


 


 98.1 F   71   18   96/63 L  95 


 


 01/11/18 00:06  01/11/18 00:06  01/11/18 00:06  01/11/18 00:06  01/11/18 00:06











- Medications


Medications: 


 Current Medications





Atorvastatin Calcium (Lipitor)  20 mg PO HS [facility]


   Last Admin: 01/10/18 21:39 Dose:  20 mg


Divalproex Sodium (Depakote Dr(*Bid*))  500 mg PO BID [facility]


   Last Admin: 01/10/18 17:27 Dose:  500 mg


Enalapril Maleate (Vasotec)  10 mg PO DAILY [facility]


   Last Admin: 01/10/18 08:55 Dose:  10 mg


Fenofibrate (Tricor)  145 mg PO DAILY [facility]


   Last Admin: 01/10/18 08:54 Dose:  145 mg


Haloperidol (Haldol)  1 mg PO Q8 PRN


   PRN Reason: Agitation


   Last Admin: 12/31/17 08:43 Dose:  1 mg


Haloperidol Lactate (Haldol)  1 mg IM Q8 PRN


   PRN Reason: Agitation


Metoprolol Tartrate (Lopressor)  50 mg PO Q12 [facility]


   Last Admin: 01/10/18 21:38 Dose:  50 mg


Quetiapine Fumarate (Seroquel)  50 mg PO Q12 [facility]


   Last Admin: 01/10/18 21:39 Dose:  50 mg











- Labs


Labs: 


 





 12/19/17 05:45 





 12/19/17 05:45 





 











PT  11.2 SECONDS (9.4-12.0)   12/14/15  05:20    


 


INR  1.05  (0.89-1.20)   12/14/15  05:20    


 


APTT  36.2 SECONDS (23.1-32.0)  H  12/14/15  05:20    














- Constitutional


Appears: Well, Non-toxic, No Acute Distress





- Head Exam


Head Exam: ATRAUMATIC, NORMAL INSPECTION, NORMOCEPHALIC





- Eye Exam


Eye Exam: EOMI, Normal appearance, PERRL


Pupil Exam: NORMAL ACCOMODATION, PERRL





- ENT Exam


ENT Exam: Mucous Membranes Moist, Normal Exam





- Neck Exam


Neck Exam: Full ROM, Normal Inspection.  absent: Lymphadenopathy





- Respiratory Exam


Respiratory Exam: Clear to Ausculation Bilateral, NORMAL BREATHING PATTERN





- Cardiovascular Exam


Cardiovascular Exam: REGULAR RHYTHM, RRR, +S1, +S2.  absent: Murmur





- GI/Abdominal Exam


GI & Abdominal Exam: Soft, Normal Bowel Sounds.  absent: Tenderness





- Extremities Exam


Extremities Exam: Full ROM, Normal Capillary Refill, Normal Inspection.  absent

: Joint Swelling, Pedal Edema





- Back Exam


Back Exam: NORMAL INSPECTION





- Neurological Exam


Neurological Exam: Alert, Awake, CN II-XII Intact, Normal Gait, Oriented x3





- Psychiatric Exam


Psychiatric exam: Normal Affect, Normal Mood





- Skin


Skin Exam: Dry, Intact, Normal Color, Warm





Assessment and Plan


(1) DVT prophylaxis


Status: Chronic   





(2) Scrotal edema


Status: Chronic   





(3) CAD (coronary artery disease)


Status: Chronic   





(4) Smoking


Status: Chronic   





(5) Low back pain


Status: Chronic   





(6) Prediabetes


Status: Chronic   





(7) Neurocognitive disorder


Status: Chronic

## 2018-01-12 RX ADMIN — DIVALPROEX SODIUM SCH MG: 250 TABLET, DELAYED RELEASE ORAL at 16:41

## 2018-01-12 RX ADMIN — DIVALPROEX SODIUM SCH MG: 250 TABLET, DELAYED RELEASE ORAL at 08:09

## 2018-01-12 NOTE — CP.PCM.PN
Subjective





- Date & Time of Evaluation


Date of Evaluation: 01/12/18


Time of Evaluation: 09:03





- Subjective


Subjective: 





s/o no f/c, n/v/d. no other complaints.calm and cooperative. no abd pain, 


a/p cont plan, cont placement, cont all medical management, cont psych meds


remains unable to make medical decisians for self. pending state disability/

guardianship/placement





Objective





- Vital Signs/Intake and Output


Vital Signs (last 24 hours): 


 











Temp Pulse Resp BP Pulse Ox


 


 97.4 F L  64   18   97/64 L  96 


 


 01/12/18 08:23  01/12/18 08:23  01/12/18 08:23  01/12/18 08:23  01/12/18 08:23











- Medications


Medications: 


 Current Medications





Atorvastatin Calcium (Lipitor)  20 mg PO HS [facility]


   Last Admin: 01/11/18 21:09 Dose:  20 mg


Divalproex Sodium (Depakote Dr(*Bid*))  500 mg PO BID [facility]


   Last Admin: 01/12/18 08:09 Dose:  500 mg


Enalapril Maleate (Vasotec)  10 mg PO DAILY [facility]


   Last Admin: 01/12/18 08:10 Dose:  10 mg


Fenofibrate (Tricor)  145 mg PO DAILY [facility]


   Last Admin: 01/12/18 08:09 Dose:  145 mg


Haloperidol (Haldol)  1 mg PO Q8 PRN


   PRN Reason: Agitation


   Last Admin: 12/31/17 08:43 Dose:  1 mg


Haloperidol Lactate (Haldol)  1 mg IM Q8 PRN


   PRN Reason: Agitation


Metoprolol Tartrate (Lopressor)  50 mg PO Q12 [facility]


   Last Admin: 01/12/18 08:09 Dose:  50 mg


Quetiapine Fumarate (Seroquel)  50 mg PO Q12 [facility]


   Last Admin: 01/12/18 08:10 Dose:  50 mg











- Labs


Labs: 


 





 12/19/17 05:45 





 12/19/17 05:45 





 











PT  11.2 SECONDS (9.4-12.0)   12/14/15  05:20    


 


INR  1.05  (0.89-1.20)   12/14/15  05:20    


 


APTT  36.2 SECONDS (23.1-32.0)  H  12/14/15  05:20    














- Constitutional


Appears: Well, Non-toxic, No Acute Distress





- Head Exam


Head Exam: ATRAUMATIC, NORMAL INSPECTION, NORMOCEPHALIC





- Eye Exam


Eye Exam: EOMI, Normal appearance, PERRL


Pupil Exam: NORMAL ACCOMODATION, PERRL





- ENT Exam


ENT Exam: Mucous Membranes Moist, Normal Exam





- Neck Exam


Neck Exam: Full ROM, Normal Inspection.  absent: Lymphadenopathy





- Respiratory Exam


Respiratory Exam: Clear to Ausculation Bilateral, NORMAL BREATHING PATTERN





- Cardiovascular Exam


Cardiovascular Exam: REGULAR RHYTHM, RRR, +S1, +S2.  absent: Murmur





- GI/Abdominal Exam


GI & Abdominal Exam: Soft, Normal Bowel Sounds.  absent: Tenderness





- Extremities Exam


Extremities Exam: Full ROM, Normal Capillary Refill, Normal Inspection.  absent

: Joint Swelling, Pedal Edema





- Back Exam


Back Exam: NORMAL INSPECTION





- Neurological Exam


Neurological Exam: Alert, Awake, CN II-XII Intact, Normal Gait, Oriented x3





- Psychiatric Exam


Psychiatric exam: Normal Affect, Normal Mood





- Skin


Skin Exam: Dry, Intact, Normal Color, Warm





Assessment and Plan


(1) DVT prophylaxis


Status: Chronic   





(2) Scrotal edema


Status: Chronic   





(3) CAD (coronary artery disease)


Status: Chronic   





(4) Smoking


Status: Chronic   





(5) Low back pain


Status: Chronic   





(6) Prediabetes


Status: Chronic   





(7) Neurocognitive disorder


Status: Chronic

## 2018-01-13 RX ADMIN — DIVALPROEX SODIUM SCH MG: 250 TABLET, DELAYED RELEASE ORAL at 17:37

## 2018-01-13 RX ADMIN — DIVALPROEX SODIUM SCH MG: 250 TABLET, DELAYED RELEASE ORAL at 10:10

## 2018-01-13 NOTE — CP.PCM.PN
Subjective





- Date & Time of Evaluation


Date of Evaluation: 01/13/18


Time of Evaluation: 10:00





- Subjective


Subjective: 





s/o no f/c, n/v/d. no other complaints.calm and cooperative. no abd pain, 


a/p cont plan, cont placement, cont all medical management, cont psych meds


remains unable to make medical decisians for self. pending state disability/

guardianship/placement





Objective





- Vital Signs/Intake and Output


Vital Signs (last 24 hours): 


 











Temp Pulse Resp BP Pulse Ox


 


 97.1 F L  57 L  20   112/77   98 


 


 01/13/18 08:19  01/13/18 08:19  01/13/18 08:19  01/13/18 08:19  01/13/18 08:19











- Medications


Medications: 


 Current Medications





Atorvastatin Calcium (Lipitor)  20 mg PO HS [facility]


   Last Admin: 01/12/18 21:28 Dose:  20 mg


Divalproex Sodium (Depakote Dr(*Bid*))  500 mg PO BID [facility]


   Last Admin: 01/12/18 16:41 Dose:  500 mg


Enalapril Maleate (Vasotec)  10 mg PO DAILY [facility]


   Last Admin: 01/12/18 08:10 Dose:  10 mg


Fenofibrate (Tricor)  145 mg PO DAILY [facility]


   Last Admin: 01/12/18 08:09 Dose:  145 mg


Haloperidol (Haldol)  1 mg PO Q8 PRN


   PRN Reason: Agitation


   Last Admin: 12/31/17 08:43 Dose:  1 mg


Haloperidol Lactate (Haldol)  1 mg IM Q8 PRN


   PRN Reason: Agitation


Metoprolol Tartrate (Lopressor)  50 mg PO Q12 [facility]


   Last Admin: 01/12/18 21:25 Dose:  50 mg


Quetiapine Fumarate (Seroquel)  50 mg PO Q12 [facility]


   Last Admin: 01/12/18 21:28 Dose:  50 mg











- Labs


Labs: 


 





 12/19/17 05:45 





 12/19/17 05:45 





 











PT  11.2 SECONDS (9.4-12.0)   12/14/15  05:20    


 


INR  1.05  (0.89-1.20)   12/14/15  05:20    


 


APTT  36.2 SECONDS (23.1-32.0)  H  12/14/15  05:20    














- Constitutional


Appears: Well, Non-toxic, No Acute Distress





- Head Exam


Head Exam: ATRAUMATIC, NORMAL INSPECTION, NORMOCEPHALIC





- Eye Exam


Eye Exam: EOMI, Normal appearance, PERRL


Pupil Exam: NORMAL ACCOMODATION, PERRL





- ENT Exam


ENT Exam: Mucous Membranes Moist, Normal Exam





- Neck Exam


Neck Exam: Full ROM, Normal Inspection.  absent: Lymphadenopathy





- Respiratory Exam


Respiratory Exam: Clear to Ausculation Bilateral, NORMAL BREATHING PATTERN





- Cardiovascular Exam


Cardiovascular Exam: REGULAR RHYTHM, RRR, +S1, +S2.  absent: Murmur





- GI/Abdominal Exam


GI & Abdominal Exam: Soft, Normal Bowel Sounds.  absent: Tenderness





- Extremities Exam


Extremities Exam: Full ROM, Normal Capillary Refill, Normal Inspection.  absent

: Joint Swelling, Pedal Edema





- Back Exam


Back Exam: NORMAL INSPECTION





- Neurological Exam


Neurological Exam: Alert, Awake, CN II-XII Intact, Normal Gait, Oriented x3





- Psychiatric Exam


Psychiatric exam: Normal Affect, Normal Mood





- Skin


Skin Exam: Dry, Intact, Normal Color, Warm





Assessment and Plan


(1) DVT prophylaxis


Status: Chronic   





(2) Scrotal edema


Status: Chronic   





(3) CAD (coronary artery disease)


Status: Chronic   





(4) Smoking


Status: Chronic   





(5) Low back pain


Status: Chronic   





(6) Prediabetes


Status: Chronic   





(7) Neurocognitive disorder


Status: Chronic

## 2018-01-14 RX ADMIN — DIVALPROEX SODIUM SCH MG: 250 TABLET, DELAYED RELEASE ORAL at 08:11

## 2018-01-14 RX ADMIN — DIVALPROEX SODIUM SCH MG: 250 TABLET, DELAYED RELEASE ORAL at 16:21

## 2018-01-14 NOTE — CP.PCM.PN
Subjective





- Date & Time of Evaluation


Date of Evaluation: 01/14/18


Time of Evaluation: 09:16





- Subjective


Subjective: 





s/o no f/c, n/v/d. no other complaints.calm and cooperative. no abd pain, 


a/p cont plan, cont placement, cont all medical management, cont psych meds


remains unable to make medical decisians for self. pending state disability/

guardianship/placement





Objective





- Vital Signs/Intake and Output


Vital Signs (last 24 hours): 


 











Temp Pulse Resp BP Pulse Ox


 


 97.4 F L  96 H  20   110/74   97 


 


 01/14/18 08:06  01/14/18 08:11  01/14/18 08:06  01/14/18 08:11  01/14/18 08:06











- Medications


Medications: 


 Current Medications





Atorvastatin Calcium (Lipitor)  20 mg PO HS [facility]


   Last Admin: 01/13/18 21:14 Dose:  20 mg


Divalproex Sodium (Depakote Dr(*Bid*))  500 mg PO BID [facility]


   Last Admin: 01/14/18 08:11 Dose:  500 mg


Enalapril Maleate (Vasotec)  10 mg PO DAILY [facility]


   Last Admin: 01/14/18 08:12 Dose:  10 mg


Fenofibrate (Tricor)  145 mg PO DAILY [facility]


   Last Admin: 01/14/18 08:12 Dose:  145 mg


Haloperidol (Haldol)  1 mg PO Q8 PRN


   PRN Reason: Agitation


   Last Admin: 12/31/17 08:43 Dose:  1 mg


Haloperidol Lactate (Haldol)  1 mg IM Q8 PRN


   PRN Reason: Agitation


Metoprolol Tartrate (Lopressor)  50 mg PO Q12 [facility]


   Last Admin: 01/14/18 08:11 Dose:  50 mg


Quetiapine Fumarate (Seroquel)  50 mg PO Q12 [facility]


   Last Admin: 01/14/18 08:12 Dose:  50 mg











- Labs


Labs: 


 





 12/19/17 05:45 





 12/19/17 05:45 





 











PT  11.2 SECONDS (9.4-12.0)   12/14/15  05:20    


 


INR  1.05  (0.89-1.20)   12/14/15  05:20    


 


APTT  36.2 SECONDS (23.1-32.0)  H  12/14/15  05:20    














- Constitutional


Appears: Well, Non-toxic, No Acute Distress





- Head Exam


Head Exam: ATRAUMATIC, NORMAL INSPECTION, NORMOCEPHALIC





- Eye Exam


Eye Exam: EOMI, Normal appearance, PERRL


Pupil Exam: NORMAL ACCOMODATION, PERRL





- ENT Exam


ENT Exam: Mucous Membranes Moist, Normal Exam





- Neck Exam


Neck Exam: Full ROM, Normal Inspection.  absent: Lymphadenopathy





- Respiratory Exam


Respiratory Exam: Clear to Ausculation Bilateral, NORMAL BREATHING PATTERN





- Cardiovascular Exam


Cardiovascular Exam: REGULAR RHYTHM, +S1, +S2.  absent: Murmur





- GI/Abdominal Exam


GI & Abdominal Exam: Soft, Normal Bowel Sounds.  absent: Tenderness





- Extremities Exam


Extremities Exam: Full ROM, Normal Capillary Refill, Normal Inspection.  absent

: Joint Swelling, Pedal Edema





- Back Exam


Back Exam: NORMAL INSPECTION





- Neurological Exam


Neurological Exam: Alert, Awake, CN II-XII Intact, Normal Gait, Oriented x3





- Psychiatric Exam


Psychiatric exam: Normal Affect, Normal Mood





- Skin


Skin Exam: Dry, Intact, Normal Color, Warm





Assessment and Plan


(1) DVT prophylaxis


Status: Chronic   





(2) Scrotal edema


Status: Chronic   





(3) CAD (coronary artery disease)


Status: Chronic   





(4) Smoking


Status: Chronic   





(5) Low back pain


Status: Chronic   





(6) Prediabetes


Status: Chronic   





(7) Neurocognitive disorder


Status: Chronic

## 2018-01-15 RX ADMIN — DIVALPROEX SODIUM SCH MG: 250 TABLET, DELAYED RELEASE ORAL at 17:14

## 2018-01-15 RX ADMIN — DIVALPROEX SODIUM SCH MG: 250 TABLET, DELAYED RELEASE ORAL at 09:48

## 2018-01-15 NOTE — CP.PCM.PN
Subjective





- Date & Time of Evaluation


Date of Evaluation: 01/15/18


Time of Evaluation: 07:31





- Subjective


Subjective: 





s/o no f/c, n/v/d. no other complaints.calm and cooperative. no abd pain, 


a/p cont plan, cont placement, cont all medical management, cont psych meds


remains unable to make medical decisians for self. pending state disability/

guardianship/placement





Objective





- Vital Signs/Intake and Output


Vital Signs (last 24 hours): 


 











Temp Pulse Resp BP Pulse Ox


 


 98.0 F   67   20   99/62 L  97 


 


 01/14/18 23:35  01/14/18 23:35  01/14/18 23:35  01/14/18 23:35  01/14/18 23:35











- Medications


Medications: 


 Current Medications





Atorvastatin Calcium (Lipitor)  20 mg PO HS [facility]


   Last Admin: 01/14/18 21:00 Dose:  20 mg


Divalproex Sodium (Depakote Dr(*Bid*))  500 mg PO BID [facility]


   Last Admin: 01/14/18 16:21 Dose:  500 mg


Enalapril Maleate (Vasotec)  10 mg PO DAILY [facility]


   Last Admin: 01/14/18 08:12 Dose:  10 mg


Fenofibrate (Tricor)  145 mg PO DAILY [facility]


   Last Admin: 01/14/18 08:12 Dose:  145 mg


Haloperidol (Haldol)  1 mg PO Q8 PRN


   PRN Reason: Agitation


   Last Admin: 12/31/17 08:43 Dose:  1 mg


Haloperidol Lactate (Haldol)  1 mg IM Q8 PRN


   PRN Reason: Agitation


Metoprolol Tartrate (Lopressor)  50 mg PO Q12 [facility]


   Last Admin: 01/14/18 20:56 Dose:  50 mg


Quetiapine Fumarate (Seroquel)  50 mg PO Q12 [facility]


   Last Admin: 01/14/18 20:57 Dose:  50 mg











- Labs


Labs: 


 





 12/19/17 05:45 





 12/19/17 05:45 





 











PT  11.2 SECONDS (9.4-12.0)   12/14/15  05:20    


 


INR  1.05  (0.89-1.20)   12/14/15  05:20    


 


APTT  36.2 SECONDS (23.1-32.0)  H  12/14/15  05:20    














- Constitutional


Appears: Well, Non-toxic, No Acute Distress





- Head Exam


Head Exam: ATRAUMATIC, NORMAL INSPECTION, NORMOCEPHALIC





- Eye Exam


Eye Exam: EOMI, Normal appearance, PERRL


Pupil Exam: NORMAL ACCOMODATION, PERRL





- ENT Exam


ENT Exam: Mucous Membranes Moist, Normal Exam





- Neck Exam


Neck Exam: Full ROM, Normal Inspection.  absent: Lymphadenopathy





- Respiratory Exam


Respiratory Exam: Clear to Ausculation Bilateral, NORMAL BREATHING PATTERN





- Cardiovascular Exam


Cardiovascular Exam: REGULAR RHYTHM, RRR, +S1, +S2.  absent: Murmur





- GI/Abdominal Exam


GI & Abdominal Exam: Soft, Normal Bowel Sounds.  absent: Tenderness





- Extremities Exam


Extremities Exam: Full ROM, Normal Capillary Refill, Normal Inspection.  absent

: Joint Swelling, Pedal Edema





- Back Exam


Back Exam: NORMAL INSPECTION





- Neurological Exam


Neurological Exam: Alert, Awake, CN II-XII Intact, Normal Gait, Oriented x3





- Psychiatric Exam


Psychiatric exam: Normal Affect, Normal Mood





- Skin


Skin Exam: Dry, Intact, Normal Color, Warm





Assessment and Plan


(1) DVT prophylaxis


Status: Chronic   





(2) Scrotal edema


Status: Chronic   





(3) CAD (coronary artery disease)


Status: Chronic   





(4) Smoking


Status: Chronic   





(5) Low back pain


Status: Chronic   





(6) Prediabetes


Status: Chronic   





(7) Neurocognitive disorder


Status: Chronic

## 2018-01-16 RX ADMIN — DIVALPROEX SODIUM SCH MG: 250 TABLET, DELAYED RELEASE ORAL at 16:52

## 2018-01-16 RX ADMIN — DIVALPROEX SODIUM SCH MG: 250 TABLET, DELAYED RELEASE ORAL at 09:09

## 2018-01-16 NOTE — CP.PCM.PN
Subjective





- Date & Time of Evaluation


Date of Evaluation: 01/16/18


Time of Evaluation: 07:08





- Subjective


Subjective: 





s/o no f/c, n/v/d. no other complaints.calm and cooperative. no abd pain, 


a/p cont plan, cont placement, cont all medical management, cont psych meds


remains unable to make medical decisians for self. pending state disability/

guardianship/placement





Objective





- Vital Signs/Intake and Output


Vital Signs (last 24 hours): 


 











Temp Pulse Resp BP Pulse Ox


 


 98.2 F   75   19   105/63   95 


 


 01/16/18 00:02  01/16/18 00:02  01/16/18 00:02  01/16/18 00:02  01/16/18 00:02











- Medications


Medications: 


 Current Medications





Atorvastatin Calcium (Lipitor)  [facility]


   Last Admin: 01/15/18 22:23 Dose:  20 mg


Divalproex Sodium (Depakote Dr(*Bid*))  500 mg PO BID [facility]


   Last Admin: 01/15/18 17:14 Dose:  500 mg


Enalapril Maleate (Vasotec)  10 mg PO DAILY [facility]


   Last Admin: 01/15/18 09:50 Dose:  Not Given


Fenofibrate (Tricor)  145 mg PO [facility]ILY [facility]


   Last Admin: 01/15/18 09:49 Dose:  145 mg


Haloperidol (Haldol)  1 mg PO Q8 PRN


   PRN Reason: Agitation


   Last Admin: 12/31/17 08:43 Dose:  1 mg


Haloperidol Lactate (Haldol)  1 mg IM Q8 PRN


   PRN Reason: Agitation


Metoprolol Tartrate (Lopressor)  50 mg PO Q12 [facility]


   Last Admin: 01/15/18 22:23 Dose:  Not Given


Quetiapine Fumarate (Seroquel)  50 mg PO Q12 [facility]


   Last Admin: 01/15/18 22:23 Dose:  50 mg











- Labs


Labs: 


 





 12/19/17 05:45 





 12/19/17 05:45 





 











PT  11.2 SECONDS (9.4-12.0)   12/14/15  05:20    


 


INR  1.05  (0.89-1.20)   12/14/15  05:20    


 


APTT  36.2 SECONDS (23.1-32.0)  H  12/14/15  05:20    














- Constitutional


Appears: Well, Non-toxic, No Acute Distress





- Head Exam


Head Exam: ATRAUMATIC, NORMAL INSPECTION, NORMOCEPHALIC





- Eye Exam


Eye Exam: EOMI, Normal appearance, PERRL


Pupil Exam: NORMAL ACCOMODATION, PERRL





- ENT Exam


ENT Exam: Mucous Membranes Moist, Normal Exam





- Neck Exam


Neck Exam: Full ROM, Normal Inspection.  absent: Lymphadenopathy





- Respiratory Exam


Respiratory Exam: Clear to Ausculation Bilateral, NORMAL BREATHING PATTERN





- Cardiovascular Exam


Cardiovascular Exam: REGULAR RHYTHM, RRR, +S1, +S2.  absent: Murmur





- GI/Abdominal Exam


GI & Abdominal Exam: Soft, Normal Bowel Sounds.  absent: Tenderness





- Extremities Exam


Extremities Exam: Full ROM, Normal Capillary Refill, Normal Inspection.  absent

: Joint Swelling, Pedal Edema





- Back Exam


Back Exam: NORMAL INSPECTION





- Neurological Exam


Neurological Exam: Alert, Awake, CN II-XII Intact, Normal Gait, Oriented x3





- Psychiatric Exam


Psychiatric exam: Normal Affect, Normal Mood





- Skin


Skin Exam: Dry, Intact, Normal Color, Warm





Assessment and Plan


(1) DVT prophylaxis


Status: Chronic   





(2) Scrotal edema


Status: Chronic   





(3) CAD (coronary artery disease)


Status: Chronic   





(4) Smoking


Status: Chronic   





(5) Low back pain


Status: Chronic   





(6) Prediabetes


Status: Chronic   





(7) Neurocognitive disorder


Status: Chronic

## 2018-01-17 RX ADMIN — DIVALPROEX SODIUM SCH MG: 250 TABLET, DELAYED RELEASE ORAL at 16:29

## 2018-01-17 RX ADMIN — DIVALPROEX SODIUM SCH MG: 250 TABLET, DELAYED RELEASE ORAL at 09:29

## 2018-01-17 NOTE — CP.PCM.PN
Subjective





- Date & Time of Evaluation


Date of Evaluation: 01/17/18


Time of Evaluation: 07:59





- Subjective


Subjective: 





s/o no f/c, n/v/d. no other complaints.calm and cooperative. no abd pain, 


a/p cont plan, cont placement, cont all medical management, cont psych meds


remains unable to make medical decisians for self. pending state disability/

guardianship/placement





Objective





- Vital Signs/Intake and Output


Vital Signs (last 24 hours): 


 











Temp Pulse Resp BP Pulse Ox


 


 97.6 F   70   19   103/75   99 


 


 01/17/18 00:40  01/17/18 00:40  01/17/18 00:40  01/17/18 00:40  01/17/18 00:40











- Medications


Medications: 


 Current Medications





Atorvastatin Calcium (Lipitor)  20 mg PO HS [facility]


   Last Admin: 01/16/18 21:30 Dose:  20 mg


Divalproex Sodium (Depakote Dr(*Bid*))  500 mg PO BID [facility]


   Last Admin: 01/16/18 16:52 Dose:  500 mg


Enalapril Maleate (Vasotec)  10 mg PO DAILY [facility]


   Last Admin: 01/16/18 09:10 Dose:  10 mg


Fenofibrate (Tricor)  145 mg PO DAILY [facility]


   Last Admin: 01/16/18 09:09 Dose:  145 mg


Haloperidol (Haldol)  1 mg PO Q8 PRN


   PRN Reason: Agitation


   Last Admin: 12/31/17 08:43 Dose:  1 mg


Haloperidol Lactate (Haldol)  1 mg IM Q8 PRN


   PRN Reason: Agitation


Metoprolol Tartrate (Lopressor)  50 mg PO Q12 [facility]


   Last Admin: 01/16/18 21:29 Dose:  50 mg


Quetiapine Fumarate (Seroquel)  50 mg PO Q12 [facility]


   Last Admin: 01/16/18 21:30 Dose:  50 mg











- Labs


Labs: 


 





 12/19/17 05:45 





 12/19/17 05:45 





 











PT  11.2 SECONDS (9.4-12.0)   12/14/15  05:20    


 


INR  1.05  (0.89-1.20)   12/14/15  05:20    


 


APTT  36.2 SECONDS (23.1-32.0)  H  12/14/15  05:20    














- Constitutional


Appears: Well, Non-toxic, No Acute Distress





- Head Exam


Head Exam: ATRAUMATIC, NORMAL INSPECTION, NORMOCEPHALIC





- Eye Exam


Eye Exam: EOMI, Normal appearance, PERRL


Pupil Exam: NORMAL ACCOMODATION, PERRL





- ENT Exam


ENT Exam: Mucous Membranes Moist, Normal Exam





- Neck Exam


Neck Exam: Full ROM, Normal Inspection.  absent: Lymphadenopathy





- Respiratory Exam


Respiratory Exam: Clear to Ausculation Bilateral, NORMAL BREATHING PATTERN





- Cardiovascular Exam


Cardiovascular Exam: REGULAR RHYTHM, RRR, +S1, +S2.  absent: Murmur





- GI/Abdominal Exam


GI & Abdominal Exam: Soft, Normal Bowel Sounds.  absent: Tenderness





- Extremities Exam


Extremities Exam: Full ROM, Normal Capillary Refill, Normal Inspection.  absent

: Joint Swelling, Pedal Edema





- Back Exam


Back Exam: NORMAL INSPECTION





- Neurological Exam


Neurological Exam: Alert, Awake, CN II-XII Intact, Normal Gait, Oriented x3





- Psychiatric Exam


Psychiatric exam: Normal Affect, Normal Mood





- Skin


Skin Exam: Dry, Intact, Normal Color, Warm





Assessment and Plan


(1) DVT prophylaxis


Status: Chronic   





(2) Scrotal edema


Status: Chronic   





(3) CAD (coronary artery disease)


Status: Chronic   





(4) Smoking


Status: Chronic   





(5) Low back pain


Status: Chronic   





(6) Prediabetes


Status: Chronic   





(7) Neurocognitive disorder


Status: Chronic

## 2018-01-18 RX ADMIN — DIVALPROEX SODIUM SCH MG: 250 TABLET, DELAYED RELEASE ORAL at 16:43

## 2018-01-18 RX ADMIN — DIVALPROEX SODIUM SCH MG: 250 TABLET, DELAYED RELEASE ORAL at 11:58

## 2018-01-18 NOTE — CP.PCM.PN
Subjective





- Date & Time of Evaluation


Date of Evaluation: 01/18/18


Time of Evaluation: 08:00





- Subjective


Subjective: 





s/o no f/c, n/v/d. no other complaints.calm and cooperative. no abd pain, 


a/p cont plan, cont placement, cont all medical management, cont psych meds


remains unable to make medical decisians for self. pending state disability/

guardianship/placement





Objective





- Vital Signs/Intake and Output


Vital Signs (last 24 hours): 


 











Temp Pulse Resp BP Pulse Ox


 


 97.6 F   65   20   110/74   98 


 


 01/18/18 07:48  01/18/18 07:48  01/18/18 07:48  01/18/18 07:48  01/18/18 07:48











- Medications


Medications: 


 Current Medications





Atorvastatin Calcium (Lipitor)  20 mg PO HS [facility]


   Last Admin: 01/17/18 21:13 Dose:  20 mg


Divalproex Sodium (Depakote Dr(*Bid*))  500 mg PO BID [facility]


   Last Admin: 01/17/18 16:29 Dose:  500 mg


Enalapril Maleate (Vasotec)  10 mg PO DAILY [facility]


   Last Admin: 01/17/18 09:29 Dose:  10 mg


Fenofibrate (Tricor)  145 mg PO DAILY [facility]


   Last Admin: 01/17/18 09:30 Dose:  145 mg


Haloperidol (Haldol)  1 mg PO Q8 PRN


   PRN Reason: Agitation


   Last Admin: 12/31/17 08:43 Dose:  1 mg


Haloperidol Lactate (Haldol)  1 mg IM Q8 PRN


   PRN Reason: Agitation


Metoprolol Tartrate (Lopressor)  50 mg PO Q12 [facility]


   Last Admin: 01/17/18 21:12 Dose:  50 mg


Quetiapine Fumarate (Seroquel)  50 mg PO Q12 [facility]


   Last Admin: 01/17/18 21:13 Dose:  50 mg











- Labs


Labs: 


 





 12/19/17 05:45 





 12/19/17 05:45 





 











PT  11.2 SECONDS (9.4-12.0)   12/14/15  05:20    


 


INR  1.05  (0.89-1.20)   12/14/15  05:20    


 


APTT  36.2 SECONDS (23.1-32.0)  H  12/14/15  05:20    














- Constitutional


Appears: Well, Non-toxic, No Acute Distress





- Head Exam


Head Exam: ATRAUMATIC, NORMAL INSPECTION, NORMOCEPHALIC





- Eye Exam


Eye Exam: EOMI, Normal appearance, PERRL


Pupil Exam: NORMAL ACCOMODATION, PERRL





- ENT Exam


ENT Exam: Mucous Membranes Moist, Normal Exam





- Neck Exam


Neck Exam: Full ROM, Normal Inspection.  absent: Lymphadenopathy





- Respiratory Exam


Respiratory Exam: Clear to Ausculation Bilateral, NORMAL BREATHING PATTERN





- Cardiovascular Exam


Cardiovascular Exam: REGULAR RHYTHM, +S1, +S2.  absent: Murmur





- GI/Abdominal Exam


GI & Abdominal Exam: Soft, Normal Bowel Sounds.  absent: Tenderness





- Extremities Exam


Extremities Exam: Full ROM, Normal Capillary Refill, Normal Inspection.  absent

: Joint Swelling, Pedal Edema





- Back Exam


Back Exam: NORMAL INSPECTION





- Neurological Exam


Neurological Exam: Alert, Awake, CN II-XII Intact, Normal Gait, Oriented x3





- Psychiatric Exam


Psychiatric exam: Normal Affect, Normal Mood





- Skin


Skin Exam: Dry, Intact, Normal Color, Warm





Assessment and Plan


(1) DVT prophylaxis


Status: Chronic   





(2) Scrotal edema


Status: Chronic   





(3) CAD (coronary artery disease)


Status: Chronic   





(4) Smoking


Status: Chronic   





(5) Low back pain


Status: Chronic   





(6) Prediabetes


Status: Chronic   





(7) Neurocognitive disorder


Status: Chronic

## 2018-01-19 RX ADMIN — DIVALPROEX SODIUM SCH MG: 250 TABLET, DELAYED RELEASE ORAL at 17:02

## 2018-01-19 RX ADMIN — DIVALPROEX SODIUM SCH MG: 250 TABLET, DELAYED RELEASE ORAL at 09:03

## 2018-01-19 NOTE — CP.PCM.PN
Subjective





- Date & Time of Evaluation


Date of Evaluation: 01/19/18


Time of Evaluation: 07:42





- Subjective


Subjective: 





s/o no f/c, n/v/d. no other complaints.calm and cooperative. no abd pain, 


a/p cont plan, cont placement, cont all medical management, cont psych meds


remains unable to make medical decisians for self. pending state disability/

guardianship/placement





Objective





- Vital Signs/Intake and Output


Vital Signs (last 24 hours): 


 











Temp Pulse Resp BP Pulse Ox


 


 97.5 F L  80   20   108/74   96 


 


 01/18/18 23:56  01/18/18 23:56  01/18/18 23:56  01/18/18 23:56  01/18/18 23:56











- Medications


Medications: 


 Current Medications





Atorvastatin Calcium (Lipitor)  20 mg PO HS [facility]


   Last Admin: 01/18/18 21:15 Dose:  20 mg


Divalproex Sodium (Depakote Dr(*Bid*))  500 mg PO BID [facility]


   Last Admin: 01/18/18 16:43 Dose:  500 mg


Enalapril Maleate (Vasotec)  10 mg PO DAILY [facility]


   Last Admin: 01/18/18 11:59 Dose:  10 mg


Fenofibrate (Tricor)  145 mg PO DAILY [facility]


   Last Admin: 01/18/18 11:58 Dose:  145 mg


Haloperidol (Haldol)  1 mg PO Q8 PRN


   PRN Reason: Agitation


   Last Admin: 12/31/17 08:43 Dose:  1 mg


Haloperidol Lactate (Haldol)  1 mg IM Q8 PRN


   PRN Reason: Agitation


Metoprolol Tartrate (Lopressor)  50 mg PO Q12 [facility]


   Last Admin: 01/18/18 21:15 Dose:  50 mg


Quetiapine Fumarate (Seroquel)  50 mg PO Q12 [facility]


   Last Admin: 01/18/18 21:15 Dose:  50 mg











- Labs


Labs: 


 





 12/19/17 05:45 





 12/19/17 05:45 





 











PT  11.2 SECONDS (9.4-12.0)   12/14/15  05:20    


 


INR  1.05  (0.89-1.20)   12/14/15  05:20    


 


APTT  36.2 SECONDS (23.1-32.0)  H  12/14/15  05:20    














- Constitutional


Appears: Well, Non-toxic, No Acute Distress





- Head Exam


Head Exam: ATRAUMATIC, NORMAL INSPECTION, NORMOCEPHALIC





- Eye Exam


Eye Exam: EOMI, Normal appearance, PERRL


Pupil Exam: NORMAL ACCOMODATION, PERRL





- ENT Exam


ENT Exam: Mucous Membranes Moist, Normal Exam





- Neck Exam


Neck Exam: Full ROM, Normal Inspection.  absent: Lymphadenopathy





- Respiratory Exam


Respiratory Exam: Clear to Ausculation Bilateral, NORMAL BREATHING PATTERN





- Cardiovascular Exam


Cardiovascular Exam: REGULAR RHYTHM, RRR, +S1, +S2.  absent: Murmur





- GI/Abdominal Exam


GI & Abdominal Exam: Soft, Normal Bowel Sounds.  absent: Tenderness





- Extremities Exam


Extremities Exam: Full ROM, Normal Capillary Refill, Normal Inspection.  absent

: Joint Swelling, Pedal Edema





- Back Exam


Back Exam: NORMAL INSPECTION





- Neurological Exam


Neurological Exam: Alert, Awake, CN II-XII Intact, Normal Gait, Oriented x3





- Psychiatric Exam


Psychiatric exam: Normal Affect, Normal Mood





- Skin


Skin Exam: Dry, Intact, Normal Color, Warm





Assessment and Plan


(1) DVT prophylaxis


Status: Chronic   





(2) Scrotal edema


Status: Chronic   





(3) CAD (coronary artery disease)


Status: Chronic   





(4) Smoking


Status: Chronic   





(5) Low back pain


Status: Chronic   





(6) Prediabetes


Status: Chronic   





(7) Neurocognitive disorder


Status: Chronic

## 2018-01-20 RX ADMIN — DIVALPROEX SODIUM SCH MG: 250 TABLET, DELAYED RELEASE ORAL at 08:30

## 2018-01-20 RX ADMIN — DIVALPROEX SODIUM SCH MG: 250 TABLET, DELAYED RELEASE ORAL at 16:07

## 2018-01-20 NOTE — CP.PCM.PN
Subjective





- Date & Time of Evaluation


Date of Evaluation: 01/20/18


Time of Evaluation: 07:49





- Subjective


Subjective: 





s/o no f/c, n/v/d. no other complaints.calm and cooperative. no abd pain, 


a/p cont plan, cont placement, cont all medical management, cont psych meds


remains unable to make medical decisians for self. pending state disability/

guardianship/placement





Objective





- Vital Signs/Intake and Output


Vital Signs (last 24 hours): 


 











Temp Pulse Resp BP Pulse Ox


 


 97.6 F   67   20   112/75   100 


 


 01/20/18 07:47  01/20/18 07:47  01/20/18 07:47  01/20/18 07:47  01/20/18 07:47











- Medications


Medications: 


 Current Medications





Atorvastatin Calcium (Lipitor)  20 mg PO HS [facility]


   Last Admin: 01/19/18 22:41 Dose:  20 mg


Divalproex Sodium (Depakote Dr(*Bid*))  500 mg PO BID [facility]


   Last Admin: 01/19/18 17:02 Dose:  500 mg


Enalapril Maleate (Vasotec)  10 mg PO DAILY [facility]


   Last Admin: 01/19/18 09:05 Dose:  10 mg


Fenofibrate (Tricor)  145 mg PO DAILY [facility]


   Last Admin: 01/19/18 09:04 Dose:  145 mg


Haloperidol (Haldol)  1 mg PO Q8 PRN


   PRN Reason: Agitation


   Last Admin: 12/31/17 08:43 Dose:  1 mg


Haloperidol Lactate (Haldol)  1 mg IM Q8 PRN


   PRN Reason: Agitation


Metoprolol Tartrate (Lopressor)  50 mg PO Q12 [facility]


   Last Admin: 01/19/18 20:37 Dose:  Not Given


Quetiapine Fumarate (Seroquel)  50 mg PO Q12 [facility]


   Last Admin: 01/19/18 20:37 Dose:  50 mg











- Labs


Labs: 


 





 12/19/17 05:45 





 12/19/17 05:45 





 











PT  11.2 SECONDS (9.4-12.0)   12/14/15  05:20    


 


INR  1.05  (0.89-1.20)   12/14/15  05:20    


 


APTT  36.2 SECONDS (23.1-32.0)  H  12/14/15  05:20    














- Constitutional


Appears: Well, Non-toxic, No Acute Distress





- Head Exam


Head Exam: ATRAUMATIC, NORMAL INSPECTION, NORMOCEPHALIC





- Eye Exam


Eye Exam: EOMI, Normal appearance, PERRL


Pupil Exam: NORMAL ACCOMODATION, PERRL





- ENT Exam


ENT Exam: Mucous Membranes Moist, Normal Exam





- Neck Exam


Neck Exam: Full ROM, Normal Inspection.  absent: Lymphadenopathy





- Respiratory Exam


Respiratory Exam: Clear to Ausculation Bilateral, NORMAL BREATHING PATTERN





- Cardiovascular Exam


Cardiovascular Exam: REGULAR RHYTHM, RRR, +S1, +S2.  absent: Murmur





- GI/Abdominal Exam


GI & Abdominal Exam: Soft, Normal Bowel Sounds.  absent: Tenderness





- Extremities Exam


Extremities Exam: Full ROM, Normal Capillary Refill, Normal Inspection.  absent

: Joint Swelling, Pedal Edema





- Back Exam


Back Exam: NORMAL INSPECTION





- Neurological Exam


Neurological Exam: Alert, Awake, CN II-XII Intact, Normal Gait, Oriented x3





- Psychiatric Exam


Psychiatric exam: Normal Affect, Normal Mood





- Skin


Skin Exam: Dry, Intact, Normal Color, Warm





Assessment and Plan


(1) DVT prophylaxis


Status: Chronic   





(2) Scrotal edema


Status: Chronic   





(3) CAD (coronary artery disease)


Status: Chronic   





(4) Smoking


Status: Chronic   





(5) Low back pain


Status: Chronic   





(6) Prediabetes


Status: Chronic   





(7) Neurocognitive disorder


Status: Chronic

## 2018-01-21 RX ADMIN — DIVALPROEX SODIUM SCH MG: 250 TABLET, DELAYED RELEASE ORAL at 16:00

## 2018-01-21 RX ADMIN — DIVALPROEX SODIUM SCH MG: 250 TABLET, DELAYED RELEASE ORAL at 08:38

## 2018-01-21 NOTE — CP.PCM.PN
Subjective





- Date & Time of Evaluation


Date of Evaluation: 01/21/18


Time of Evaluation: 10:13





- Subjective


Subjective: 





s/o no f/c, n/v/d. no other complaints.calm and cooperative. no abd pain, 


a/p cont plan, cont placement, cont all medical management, cont psych meds


remains unable to make medical decisians for self. pending state disability/

guardianship/placement





Objective





- Vital Signs/Intake and Output


Vital Signs (last 24 hours): 


 











Temp Pulse Resp BP Pulse Ox


 


 97.5 F L  69   18   106/71   98 


 


 01/21/18 08:25  01/21/18 08:25  01/21/18 08:25  01/21/18 08:25  01/21/18 08:25











- Medications


Medications: 


 Current Medications





Atorvastatin Calcium (Lipitor)  20 mg PO HS [facility]


   Last Admin: 01/20/18 21:04 Dose:  20 mg


Divalproex Sodium (Depakote Dr(*Bid*))  500 mg PO BID [facility]


   Last Admin: 01/21/18 08:38 Dose:  500 mg


Enalapril Maleate (Vasotec)  10 mg PO DAILY [facility]


   Last Admin: 01/21/18 08:39 Dose:  10 mg


Fenofibrate (Tricor)  145 mg PO DAILY [facility]


   Last Admin: 01/21/18 08:39 Dose:  145 mg


Haloperidol (Haldol)  1 mg PO Q8 PRN


   PRN Reason: Agitation


   Last Admin: 12/31/17 08:43 Dose:  1 mg


Haloperidol Lactate (Haldol)  1 mg IM Q8 PRN


   PRN Reason: Agitation


Metoprolol Tartrate (Lopressor)  50 mg PO Q12 [facility]


   Last Admin: 01/21/18 08:39 Dose:  50 mg


Quetiapine Fumarate (Seroquel)  50 mg PO Q12 [facility]


   Last Admin: 01/21/18 08:39 Dose:  50 mg











- Labs


Labs: 


 





 12/19/17 05:45 





 12/19/17 05:45 





 











PT  11.2 SECONDS (9.4-12.0)   12/14/15  05:20    


 


INR  1.05  (0.89-1.20)   12/14/15  05:20    


 


APTT  36.2 SECONDS (23.1-32.0)  H  12/14/15  05:20    














- Constitutional


Appears: Well, Non-toxic, No Acute Distress





- Head Exam


Head Exam: ATRAUMATIC, NORMAL INSPECTION, NORMOCEPHALIC





- Eye Exam


Eye Exam: EOMI, Normal appearance, PERRL


Pupil Exam: NORMAL ACCOMODATION, PERRL





- ENT Exam


ENT Exam: Mucous Membranes Moist, Normal Exam





- Neck Exam


Neck Exam: Full ROM, Normal Inspection.  absent: Lymphadenopathy





- Respiratory Exam


Respiratory Exam: Clear to Ausculation Bilateral, NORMAL BREATHING PATTERN





- Cardiovascular Exam


Cardiovascular Exam: REGULAR RHYTHM, RRR, +S1, +S2.  absent: Murmur





- GI/Abdominal Exam


GI & Abdominal Exam: Soft, Normal Bowel Sounds.  absent: Tenderness





- Extremities Exam


Extremities Exam: Full ROM, Normal Capillary Refill, Normal Inspection.  absent

: Joint Swelling, Pedal Edema





- Back Exam


Back Exam: NORMAL INSPECTION





- Neurological Exam


Neurological Exam: Alert, Awake, CN II-XII Intact, Normal Gait, Oriented x3





- Psychiatric Exam


Psychiatric exam: Normal Affect, Normal Mood





- Skin


Skin Exam: Dry, Intact, Normal Color, Warm





Assessment and Plan


(1) DVT prophylaxis


Status: Chronic   





(2) Scrotal edema


Status: Chronic   





(3) CAD (coronary artery disease)


Status: Chronic   





(4) Smoking


Status: Chronic   





(5) Low back pain


Status: Chronic   





(6) Prediabetes


Status: Chronic   





(7) Neurocognitive disorder


Status: Chronic

## 2018-01-22 RX ADMIN — DIVALPROEX SODIUM SCH MG: 250 TABLET, DELAYED RELEASE ORAL at 09:40

## 2018-01-22 RX ADMIN — DIVALPROEX SODIUM SCH MG: 250 TABLET, DELAYED RELEASE ORAL at 17:15

## 2018-01-22 NOTE — CP.PCM.PN
Subjective





- Date & Time of Evaluation


Date of Evaluation: 01/22/18


Time of Evaluation: 07:53





- Subjective


Subjective: 





s/o no f/c, n/v/d. no other complaints.calm and cooperative. no abd pain, 


a/p cont plan, cont placement, cont all medical management, cont psych meds


remains unable to make medical decisians for self. pending state disability/

guardianship/placement





Objective





- Vital Signs/Intake and Output


Vital Signs (last 24 hours): 


 











Temp Pulse Resp BP Pulse Ox


 


 98.4 F   88   19   104/71   95 


 


 01/22/18 00:00  01/22/18 00:00  01/22/18 00:00  01/22/18 00:00  01/22/18 00:00











- Medications


Medications: 


 Current Medications





Atorvastatin Calcium (Lipitor)  20 mg PO HS [facility]


   Last Admin: 01/21/18 21:19 Dose:  20 mg


Divalproex Sodium (Depakote Dr(*Bid*))  500 mg PO BID [facility]


   Last Admin: 01/21/18 16:00 Dose:  500 mg


Enalapril Maleate (Vasotec)  10 mg PO DAILY [facility]


   Last Admin: 01/21/18 08:39 Dose:  10 mg


Fenofibrate (Tricor)  145 mg PO DAILY [facility]


   Last Admin: 01/21/18 08:39 Dose:  145 mg


Haloperidol (Haldol)  1 mg PO Q8 PRN


   PRN Reason: Agitation


   Last Admin: 12/31/17 08:43 Dose:  1 mg


Haloperidol Lactate (Haldol)  1 mg IM Q8 PRN


   PRN Reason: Agitation


Metoprolol Tartrate (Lopressor)  50 mg PO Q12 [facility]


   Last Admin: 01/21/18 21:19 Dose:  50 mg


Quetiapine Fumarate (Seroquel)  50 mg PO Q12 [facility]


   Last Admin: 01/21/18 21:19 Dose:  50 mg











- Labs


Labs: 


 





 12/19/17 05:45 





 12/19/17 05:45 





 











PT  11.2 SECONDS (9.4-12.0)   12/14/15  05:20    


 


INR  1.05  (0.89-1.20)   12/14/15  05:20    


 


APTT  36.2 SECONDS (23.1-32.0)  H  12/14/15  05:20    














- Constitutional


Appears: Well, Non-toxic, No Acute Distress





- Head Exam


Head Exam: ATRAUMATIC, NORMAL INSPECTION, NORMOCEPHALIC





- Eye Exam


Eye Exam: EOMI, Normal appearance, PERRL


Pupil Exam: NORMAL ACCOMODATION, PERRL





- ENT Exam


ENT Exam: Mucous Membranes Moist, Normal Exam





- Neck Exam


Neck Exam: Full ROM, Normal Inspection.  absent: Lymphadenopathy





- Respiratory Exam


Respiratory Exam: Clear to Ausculation Bilateral, NORMAL BREATHING PATTERN





- Cardiovascular Exam


Cardiovascular Exam: REGULAR RHYTHM, RRR, +S1, +S2.  absent: Murmur





- GI/Abdominal Exam


GI & Abdominal Exam: Soft, Normal Bowel Sounds.  absent: Tenderness





- Extremities Exam


Extremities Exam: Full ROM, Normal Capillary Refill, Normal Inspection.  absent

: Joint Swelling, Pedal Edema





- Back Exam


Back Exam: NORMAL INSPECTION





- Neurological Exam


Neurological Exam: Alert, Awake, CN II-XII Intact, Normal Gait, Oriented x3





- Psychiatric Exam


Psychiatric exam: Normal Affect, Normal Mood





- Skin


Skin Exam: Dry, Intact, Normal Color, Warm





Assessment and Plan


(1) DVT prophylaxis


Status: Chronic   





(2) Scrotal edema


Status: Chronic   





(3) CAD (coronary artery disease)


Status: Chronic   





(4) Smoking


Status: Chronic   





(5) Low back pain


Status: Chronic   





(6) Prediabetes


Status: Chronic   





(7) Neurocognitive disorder


Status: Chronic

## 2018-01-23 RX ADMIN — DIVALPROEX SODIUM SCH MG: 250 TABLET, DELAYED RELEASE ORAL at 17:27

## 2018-01-23 RX ADMIN — DIVALPROEX SODIUM SCH MG: 250 TABLET, DELAYED RELEASE ORAL at 09:11

## 2018-01-23 NOTE — CP.PCM.PN
Subjective





- Date & Time of Evaluation


Date of Evaluation: 01/23/18


Time of Evaluation: 07:52





- Subjective


Subjective: 





s/o no f/c, n/v/d. no other complaints.calm and cooperative. no abd pain, 


a/p cont plan, cont placement, cont all medical management, cont psych meds


remains unable to make medical decisians for self. pending state disability/

guardianship/placement





Objective





- Vital Signs/Intake and Output


Vital Signs (last 24 hours): 


 











Temp Pulse Resp BP Pulse Ox


 


 98.2 F   84   19   102/69   98 


 


 01/22/18 23:43  01/22/18 23:43  01/22/18 23:43  01/22/18 23:43  01/22/18 23:43











- Medications


Medications: 


 Current Medications





Atorvastatin Calcium (Lipitor)  20 mg PO HS [facility]


   Last Admin: 01/22/18 21:03 Dose:  20 mg


Divalproex Sodium (Depakote Dr(*Bid*))  500 mg PO BID [facility]


   Last Admin: 01/22/18 17:15 Dose:  500 mg


Enalapril Maleate (Vasotec)  10 mg PO DAILY [facility]


   Last Admin: 01/22/18 09:41 Dose:  10 mg


Fenofibrate (Tricor)  145 mg PO DAILY [facility]


   Last Admin: 01/22/18 09:41 Dose:  145 mg


Haloperidol (Haldol)  1 mg PO Q8 PRN


   PRN Reason: Agitation


   Last Admin: 12/31/17 08:43 Dose:  1 mg


Haloperidol Lactate (Haldol)  1 mg IM Q8 PRN


   PRN Reason: Agitation


Metoprolol Tartrate (Lopressor)  50 mg PO Q12 [facility]


   Last Admin: 01/22/18 21:04 Dose:  50 mg


Quetiapine Fumarate (Seroquel)  50 mg PO Q12 [facility]


   Last Admin: 01/22/18 21:04 Dose:  50 mg











- Labs


Labs: 


 





 12/19/17 05:45 





 12/19/17 05:45 





 











PT  11.2 SECONDS (9.4-12.0)   12/14/15  05:20    


 


INR  1.05  (0.89-1.20)   12/14/15  05:20    


 


APTT  36.2 SECONDS (23.1-32.0)  H  12/14/15  05:20    














- Constitutional


Appears: Well, Non-toxic, No Acute Distress





- Head Exam


Head Exam: ATRAUMATIC, NORMAL INSPECTION, NORMOCEPHALIC





- Eye Exam


Eye Exam: EOMI, Normal appearance, PERRL


Pupil Exam: NORMAL ACCOMODATION, PERRL





- ENT Exam


ENT Exam: Mucous Membranes Moist, Normal Exam





- Neck Exam


Neck Exam: Full ROM, Normal Inspection.  absent: Lymphadenopathy





- Respiratory Exam


Respiratory Exam: Clear to Ausculation Bilateral, NORMAL BREATHING PATTERN





- Cardiovascular Exam


Cardiovascular Exam: REGULAR RHYTHM, RRR, +S1, +S2.  absent: Murmur





- GI/Abdominal Exam


GI & Abdominal Exam: Soft, Normal Bowel Sounds.  absent: Tenderness





- Extremities Exam


Extremities Exam: Full ROM, Normal Capillary Refill, Normal Inspection.  absent

: Joint Swelling, Pedal Edema





- Back Exam


Back Exam: NORMAL INSPECTION





- Neurological Exam


Neurological Exam: Alert, Awake, CN II-XII Intact, Normal Gait, Oriented x3





- Psychiatric Exam


Psychiatric exam: Normal Affect, Normal Mood





- Skin


Skin Exam: Dry, Intact, Normal Color, Warm





Assessment and Plan


(1) DVT prophylaxis


Status: Chronic   





(2) Scrotal edema


Status: Chronic   





(3) CAD (coronary artery disease)


Status: Chronic   





(4) Smoking


Status: Chronic   





(5) Low back pain


Status: Chronic   





(6) Prediabetes


Status: Chronic   





(7) Neurocognitive disorder


Status: Chronic

## 2018-01-24 RX ADMIN — DIVALPROEX SODIUM SCH MG: 250 TABLET, DELAYED RELEASE ORAL at 09:07

## 2018-01-24 RX ADMIN — DIVALPROEX SODIUM SCH MG: 250 TABLET, DELAYED RELEASE ORAL at 16:56

## 2018-01-24 NOTE — CP.PCM.PN
Subjective





- Date & Time of Evaluation


Date of Evaluation: 01/24/18


Time of Evaluation: 14:45





- Subjective


Subjective: 





s/o no f/c, n/v/d. no other complaints.calm and cooperative. no abd pain, 


a/p cont plan, cont placement, cont all medical management, cont psych meds


remains unable to make medical decisians for self. pending state disability/

guardianship/placement





Objective





- Vital Signs/Intake and Output


Vital Signs (last 24 hours): 


 











Temp Pulse Resp BP Pulse Ox


 


 97.6 F   78   18   106/67   97 


 


 01/24/18 08:05  01/24/18 09:06  01/24/18 08:05  01/24/18 09:06  01/24/18 08:05











- Medications


Medications: 


 Current Medications





Atorvastatin Calcium (Lipitor)  20 mg PO HS [facility]


   Last Admin: 01/23/18 21:59 Dose:  20 mg


Divalproex Sodium (Depakote Dr(*Bid*))  500 mg PO BID [facility]


   Last Admin: 01/24/18 09:07 Dose:  500 mg


Enalapril Maleate (Vasotec)  10 mg PO DAILY [facility]


   Last Admin: 01/24/18 09:07 Dose:  10 mg


Fenofibrate (Tricor)  145 mg PO DAILY [facility]


   Last Admin: 01/24/18 09:07 Dose:  145 mg


Haloperidol (Haldol)  1 mg PO Q8 PRN


   PRN Reason: Agitation


   Last Admin: 12/31/17 08:43 Dose:  1 mg


Haloperidol Lactate (Haldol)  1 mg IM Q8 PRN


   PRN Reason: Agitation


Metoprolol Tartrate (Lopressor)  50 mg PO Q12 [facility]


   Last Admin: 01/24/18 09:06 Dose:  50 mg


Quetiapine Fumarate (Seroquel)  50 mg PO Q12 [facility]


   Last Admin: 01/24/18 09:06 Dose:  50 mg











- Labs


Labs: 


 





 12/19/17 05:45 





 12/19/17 05:45 





 











PT  11.2 SECONDS (9.4-12.0)   12/14/15  05:20    


 


INR  1.05  (0.89-1.20)   12/14/15  05:20    


 


APTT  36.2 SECONDS (23.1-32.0)  H  12/14/15  05:20    














- Constitutional


Appears: Well, Non-toxic, No Acute Distress





- Head Exam


Head Exam: ATRAUMATIC, NORMAL INSPECTION, NORMOCEPHALIC





- Eye Exam


Eye Exam: EOMI, Normal appearance, PERRL


Pupil Exam: NORMAL ACCOMODATION, PERRL





- ENT Exam


ENT Exam: Mucous Membranes Moist, Normal Exam





- Neck Exam


Neck Exam: Full ROM, Normal Inspection.  absent: Lymphadenopathy





- Respiratory Exam


Respiratory Exam: Clear to Ausculation Bilateral, NORMAL BREATHING PATTERN





- Cardiovascular Exam


Cardiovascular Exam: REGULAR RHYTHM, RRR, +S1, +S2.  absent: Murmur





- GI/Abdominal Exam


GI & Abdominal Exam: Soft, Normal Bowel Sounds.  absent: Tenderness





- Extremities Exam


Extremities Exam: Full ROM, Normal Capillary Refill, Normal Inspection.  absent

: Joint Swelling, Pedal Edema





- Back Exam


Back Exam: NORMAL INSPECTION





- Neurological Exam


Neurological Exam: Alert, Awake, CN II-XII Intact, Normal Gait, Oriented x3





- Psychiatric Exam


Psychiatric exam: Normal Affect, Normal Mood





- Skin


Skin Exam: Dry, Intact, Normal Color, Warm





Assessment and Plan


(1) DVT prophylaxis


Status: Chronic   





(2) Scrotal edema


Status: Chronic   





(3) CAD (coronary artery disease)


Status: Chronic   





(4) Smoking


Status: Chronic   





(5) Low back pain


Status: Chronic   





(6) Prediabetes


Status: Chronic   





(7) Neurocognitive disorder


Status: Chronic

## 2018-01-25 RX ADMIN — DIVALPROEX SODIUM SCH MG: 250 TABLET, DELAYED RELEASE ORAL at 16:00

## 2018-01-25 RX ADMIN — DIVALPROEX SODIUM SCH MG: 250 TABLET, DELAYED RELEASE ORAL at 08:34

## 2018-01-25 NOTE — CP.PCM.PN
Subjective





- Date & Time of Evaluation


Date of Evaluation: 01/25/18


Time of Evaluation: 08:24





- Subjective


Subjective: 





s/o no f/c, n/v/d. no other complaints.calm and cooperative. no abd pain, 


a/p cont plan, cont placement, cont all medical management, cont psych meds


remains unable to make medical decisians for self. pending state disability/

guardianship/placement





Objective





- Vital Signs/Intake and Output


Vital Signs (last 24 hours): 


 











Temp Pulse Resp BP Pulse Ox


 


 97.5 F L  68   20   93/52 L  98 


 


 01/25/18 00:42  01/25/18 00:42  01/25/18 00:42  01/25/18 00:42  01/25/18 00:42











- Medications


Medications: 


 Current Medications





Atorvastatin Calcium (Lipitor)  20 mg PO HS [facility]


   Last Admin: 01/24/18 21:24 Dose:  20 mg


Divalproex Sodium (Depakote Dr(*Bid*))  500 mg PO BID [facility]


   Last Admin: 01/24/18 16:56 Dose:  500 mg


Enalapril Maleate (Vasotec)  10 mg PO DAILY [facility]


   Last Admin: 01/24/18 09:07 Dose:  10 mg


Fenofibrate (Tricor)  145 mg PO DAILY [facility]


   Last Admin: 01/24/18 09:07 Dose:  145 mg


Haloperidol (Haldol)  1 mg PO Q8 PRN


   PRN Reason: Agitation


   Last Admin: 12/31/17 08:43 Dose:  1 mg


Haloperidol Lactate (Haldol)  1 mg IM Q8 PRN


   PRN Reason: Agitation


Metoprolol Tartrate (Lopressor)  50 mg PO Q12 [facility]


   Last Admin: 01/24/18 21:24 Dose:  Not Given


Quetiapine Fumarate (Seroquel)  50 mg PO Q12 [facility]


   Last Admin: 01/24/18 21:24 Dose:  50 mg











- Labs


Labs: 


 





 12/19/17 05:45 





 12/19/17 05:45 





 











PT  11.2 SECONDS (9.4-12.0)   12/14/15  05:20    


 


INR  1.05  (0.89-1.20)   12/14/15  05:20    


 


APTT  36.2 SECONDS (23.1-32.0)  H  12/14/15  05:20    














- Constitutional


Appears: Well, Non-toxic, No Acute Distress





- Head Exam


Head Exam: ATRAUMATIC, NORMAL INSPECTION, NORMOCEPHALIC





- Eye Exam


Eye Exam: EOMI, Normal appearance, PERRL


Pupil Exam: NORMAL ACCOMODATION, PERRL





- ENT Exam


ENT Exam: Mucous Membranes Moist, Normal Exam





- Neck Exam


Neck Exam: Full ROM, Normal Inspection.  absent: Lymphadenopathy





- Respiratory Exam


Respiratory Exam: Clear to Ausculation Bilateral, NORMAL BREATHING PATTERN





- Cardiovascular Exam


Cardiovascular Exam: REGULAR RHYTHM, RRR, +S1, +S2.  absent: Murmur





- GI/Abdominal Exam


GI & Abdominal Exam: Soft, Normal Bowel Sounds.  absent: Tenderness





- Extremities Exam


Extremities Exam: Full ROM, Normal Capillary Refill, Normal Inspection.  absent

: Joint Swelling, Pedal Edema





- Back Exam


Back Exam: NORMAL INSPECTION





- Neurological Exam


Neurological Exam: Alert, Awake, CN II-XII Intact, Normal Gait, Oriented x3





- Psychiatric Exam


Psychiatric exam: Normal Affect, Normal Mood





- Skin


Skin Exam: Dry, Intact, Normal Color, Warm





Assessment and Plan


(1) DVT prophylaxis


Status: Chronic   





(2) Scrotal edema


Status: Chronic   





(3) CAD (coronary artery disease)


Status: Chronic   





(4) Smoking


Status: Chronic   





(5) Low back pain


Status: Chronic   





(6) Prediabetes


Status: Chronic   





(7) Neurocognitive disorder


Status: Chronic

## 2018-01-26 RX ADMIN — DIVALPROEX SODIUM SCH MG: 250 TABLET, DELAYED RELEASE ORAL at 08:54

## 2018-01-26 RX ADMIN — DIVALPROEX SODIUM SCH MG: 250 TABLET, DELAYED RELEASE ORAL at 16:32

## 2018-01-26 NOTE — CP.PCM.PN
Subjective





- Date & Time of Evaluation


Date of Evaluation: 01/26/18


Time of Evaluation: 10:54





- Subjective


Subjective: 





s/o no f/c, n/v/d. no other complaints.calm and cooperative. no abd pain, 


a/p cont plan, cont placement, cont all medical management, cont psych meds


remains unable to make medical decisians for self. pending state disability/

guardianship/placement





Objective





- Vital Signs/Intake and Output


Vital Signs (last 24 hours): 


 











Temp Pulse Resp BP Pulse Ox


 


 97.5 F L  67   20   95/56 L  98 


 


 01/26/18 08:59  01/26/18 08:59  01/26/18 08:59  01/26/18 08:59  01/26/18 08:59











- Medications


Medications: 


 Current Medications





Atorvastatin Calcium (Lipitor)  [facility]


   Last Admin: 01/25/18 21:38 Dose:  20 mg


Divalproex Sodium (Depakote Dr(*Bid*))  500 mg PO BID [facility]


   Last Admin: 01/26/18 08:54 Dose:  500 mg


Enalapril Maleate (Vasotec)  10 mg PO DAILY [facility]


   Last Admin: 01/25/18 08:35 Dose:  10 mg


Fenofibrate (Tricor)  145 mg PO DAILY [facility]


   Last Admin: 01/26/18 08:54 Dose:  145 mg


Haloperidol (Haldol)  1 mg PO Q8 PRN


   PRN Reason: Agitation


   Last Admin: 12/31/17 08:43 Dose:  1 mg


Haloperidol Lactate (Haldol)  1 mg IM Q8 PRN


   PRN Reason: Agitation


Metoprolol Tartrate (Lopressor)  50 mg PO Q12 [facility]


   Last Admin: 01/26/18 08:54 Dose:  50 mg


Quetiapine Fumarate (Seroquel)  50 mg PO Q12 [facility]


   Last Admin: 01/26/18 08:55 Dose:  50 mg











- Labs


Labs: 


 





 12/19/17 05:45 





 12/19/17 05:45 





 











PT  11.2 SECONDS (9.4-12.0)   12/14/15  05:20    


 


INR  1.05  (0.89-1.20)   12/14/15  05:20    


 


APTT  36.2 SECONDS (23.1-32.0)  H  12/14/15  05:20    














- Constitutional


Appears: Well, Non-toxic, No Acute Distress





- Head Exam


Head Exam: ATRAUMATIC, NORMAL INSPECTION, NORMOCEPHALIC





- Eye Exam


Eye Exam: EOMI, Normal appearance, PERRL


Pupil Exam: NORMAL ACCOMODATION, PERRL





- ENT Exam


ENT Exam: Mucous Membranes Moist, Normal Exam





- Neck Exam


Neck Exam: Full ROM, Normal Inspection.  absent: Lymphadenopathy





- Respiratory Exam


Respiratory Exam: Clear to Ausculation Bilateral, NORMAL BREATHING PATTERN





- Cardiovascular Exam


Cardiovascular Exam: REGULAR RHYTHM, RRR, +S1, +S2.  absent: Murmur





- GI/Abdominal Exam


GI & Abdominal Exam: Soft, Normal Bowel Sounds.  absent: Tenderness





- Extremities Exam


Extremities Exam: Full ROM, Normal Capillary Refill, Normal Inspection.  absent

: Joint Swelling, Pedal Edema





- Back Exam


Back Exam: NORMAL INSPECTION





- Neurological Exam


Neurological Exam: Alert, Awake, CN II-XII Intact, Normal Gait, Oriented x3





- Psychiatric Exam


Psychiatric exam: Normal Affect, Normal Mood





- Skin


Skin Exam: Dry, Intact, Normal Color, Warm





Assessment and Plan


(1) DVT prophylaxis


Status: Chronic   





(2) Scrotal edema


Status: Chronic   





(3) CAD (coronary artery disease)


Status: Chronic   





(4) Smoking


Status: Chronic   





(5) Low back pain


Status: Chronic   





(6) Prediabetes


Status: Chronic   





(7) Neurocognitive disorder


Status: Chronic

## 2018-01-27 RX ADMIN — DIVALPROEX SODIUM SCH MG: 250 TABLET, DELAYED RELEASE ORAL at 09:16

## 2018-01-27 RX ADMIN — DIVALPROEX SODIUM SCH MG: 250 TABLET, DELAYED RELEASE ORAL at 16:56

## 2018-01-27 NOTE — CP.PCM.PN
Subjective





- Date & Time of Evaluation


Date of Evaluation: 01/27/18


Time of Evaluation: 09:31





- Subjective


Subjective: 





s/o no f/c, n/v/d. no other complaints.calm and cooperative. no abd pain, 


a/p cont plan, cont placement, cont all medical management, cont psych meds


remains unable to make medical decisians for self. pending state disability/

guardianship/placement





Objective





- Vital Signs/Intake and Output


Vital Signs (last 24 hours): 


 











Temp Pulse Resp BP Pulse Ox


 


 98.1 F   71   20   99/71 L  98 


 


 01/27/18 08:17  01/27/18 09:16  01/27/18 08:17  01/27/18 09:16  01/27/18 08:17











- Medications


Medications: 


 Current Medications





Atorvastatin Calcium (Lipitor)  20 mg PO HS [facility]


   Last Admin: 01/26/18 21:14 Dose:  20 mg


Divalproex Sodium (Depakote Dr(*Bid*))  500 mg PO BID [facility]


   Last Admin: 01/27/18 09:16 Dose:  500 mg


Enalapril Maleate (Vasotec)  10 mg PO DAILY [facility]


   Last Admin: 01/27/18 09:17 Dose:  Not Given


Fenofibrate (Tricor)  145 mg PO DAILY [facility]


   Last Admin: 01/27/18 09:17 Dose:  145 mg


Haloperidol (Haldol)  1 mg PO Q8 PRN


   PRN Reason: Agitation


   Last Admin: 12/31/17 08:43 Dose:  1 mg


Haloperidol Lactate (Haldol)  1 mg IM Q8 PRN


   PRN Reason: Agitation


Metoprolol Tartrate (Lopressor)  50 mg PO Q12 [facility]


   Last Admin: 01/27/18 09:16 Dose:  Not Given


Quetiapine Fumarate (Seroquel)  50 mg PO Q12 [facility]


   Last Admin: 01/27/18 09:17 Dose:  50 mg











- Labs


Labs: 


 





 12/19/17 05:45 





 12/19/17 05:45 





 











PT  11.2 SECONDS (9.4-12.0)   12/14/15  05:20    


 


INR  1.05  (0.89-1.20)   12/14/15  05:20    


 


APTT  36.2 SECONDS (23.1-32.0)  H  12/14/15  05:20    














- Constitutional


Appears: Well, Non-toxic, No Acute Distress





- Head Exam


Head Exam: ATRAUMATIC, NORMAL INSPECTION, NORMOCEPHALIC





- Eye Exam


Eye Exam: EOMI, Normal appearance, PERRL


Pupil Exam: NORMAL ACCOMODATION, PERRL





- ENT Exam


ENT Exam: Mucous Membranes Moist, Normal Exam





- Neck Exam


Neck Exam: Full ROM, Normal Inspection.  absent: Lymphadenopathy





- Respiratory Exam


Respiratory Exam: Clear to Ausculation Bilateral, NORMAL BREATHING PATTERN





- Cardiovascular Exam


Cardiovascular Exam: REGULAR RHYTHM, RRR, +S1, +S2.  absent: Murmur





- GI/Abdominal Exam


GI & Abdominal Exam: Soft, Normal Bowel Sounds.  absent: Tenderness





- Extremities Exam


Extremities Exam: Full ROM, Normal Capillary Refill, Normal Inspection.  absent

: Joint Swelling, Pedal Edema





- Back Exam


Back Exam: NORMAL INSPECTION





- Neurological Exam


Neurological Exam: Alert, Awake, CN II-XII Intact, Normal Gait, Oriented x3





- Psychiatric Exam


Psychiatric exam: Normal Affect, Normal Mood





- Skin


Skin Exam: Dry, Intact, Normal Color, Warm





Assessment and Plan


(1) DVT prophylaxis


Status: Chronic   





(2) Scrotal edema


Status: Chronic   





(3) CAD (coronary artery disease)


Status: Chronic   





(4) Smoking


Status: Chronic   





(5) Low back pain


Status: Chronic   





(6) Prediabetes


Status: Chronic   





(7) Neurocognitive disorder


Status: Chronic

## 2018-01-28 RX ADMIN — DIVALPROEX SODIUM SCH MG: 250 TABLET, DELAYED RELEASE ORAL at 16:16

## 2018-01-28 RX ADMIN — DIVALPROEX SODIUM SCH MG: 250 TABLET, DELAYED RELEASE ORAL at 08:28

## 2018-01-28 NOTE — CP.PCM.PN
Subjective





- Date & Time of Evaluation


Date of Evaluation: 01/28/18


Time of Evaluation: 10:38





- Subjective


Subjective: 





s/o no f/c, n/v/d. no other complaints.calm and cooperative. no abd pain, 


a/p cont plan, cont placement, cont all medical management, cont psych meds


remains unable to make medical decisians for self. pending state disability/

guardianship/placement





Objective





- Vital Signs/Intake and Output


Vital Signs (last 24 hours): 


 











Temp Pulse Resp BP Pulse Ox


 


 97.5 F L  70   20   104/69   97 


 


 01/28/18 08:26  01/28/18 08:28  01/28/18 08:26  01/28/18 08:28  01/28/18 08:26











- Medications


Medications: 


 Current Medications





Atorvastatin Calcium (Lipitor)  20 mg PO HS [facility]


   Last Admin: 01/27/18 21:04 Dose:  20 mg


Divalproex Sodium (Depakote Dr(*Bid*))  500 mg PO BID [facility]


   Last Admin: 01/28/18 08:28 Dose:  500 mg


Enalapril Maleate (Vasotec)  10 mg PO DAILY [facility]


   Last Admin: 01/28/18 08:29 Dose:  10 mg


Fenofibrate (Tricor)  145 mg PO DAILY [facility]


   Last Admin: 01/28/18 08:28 Dose:  145 mg


Haloperidol (Haldol)  1 mg PO Q8 PRN


   PRN Reason: Agitation


   Last Admin: 12/31/17 08:43 Dose:  1 mg


Haloperidol Lactate (Haldol)  1 mg IM Q8 PRN


   PRN Reason: Agitation


Metoprolol Tartrate (Lopressor)  50 mg PO Q12 [facility]


   Last Admin: 01/28/18 08:28 Dose:  50 mg


Quetiapine Fumarate (Seroquel)  50 mg PO Q12 [facility]


   Last Admin: 01/28/18 08:29 Dose:  50 mg











- Labs


Labs: 


 





 12/19/17 05:45 





 12/19/17 05:45 





 











PT  11.2 SECONDS (9.4-12.0)   12/14/15  05:20    


 


INR  1.05  (0.89-1.20)   12/14/15  05:20    


 


APTT  36.2 SECONDS (23.1-32.0)  H  12/14/15  05:20    














- Constitutional


Appears: Well, Non-toxic, No Acute Distress





- Head Exam


Head Exam: ATRAUMATIC, NORMAL INSPECTION, NORMOCEPHALIC





- Eye Exam


Eye Exam: EOMI, Normal appearance, PERRL


Pupil Exam: NORMAL ACCOMODATION, PERRL





- ENT Exam


ENT Exam: Mucous Membranes Moist, Normal Exam





- Neck Exam


Neck Exam: Full ROM, Normal Inspection.  absent: Lymphadenopathy





- Respiratory Exam


Respiratory Exam: Clear to Ausculation Bilateral, NORMAL BREATHING PATTERN





- Cardiovascular Exam


Cardiovascular Exam: REGULAR RHYTHM, RRR, +S1, +S2.  absent: Murmur





- GI/Abdominal Exam


GI & Abdominal Exam: Soft, Normal Bowel Sounds.  absent: Tenderness





- Extremities Exam


Extremities Exam: Full ROM, Normal Capillary Refill, Normal Inspection.  absent

: Joint Swelling, Pedal Edema





- Back Exam


Back Exam: NORMAL INSPECTION





- Neurological Exam


Neurological Exam: Alert, Awake, CN II-XII Intact, Normal Gait, Oriented x3





- Psychiatric Exam


Psychiatric exam: Normal Affect, Normal Mood





- Skin


Skin Exam: Dry, Intact, Normal Color, Warm





Assessment and Plan


(1) DVT prophylaxis


Status: Chronic   





(2) Scrotal edema


Status: Chronic   





(3) CAD (coronary artery disease)


Status: Chronic   





(4) Smoking


Status: Chronic   





(5) Low back pain


Status: Chronic   





(6) Prediabetes


Status: Chronic   





(7) Neurocognitive disorder


Status: Chronic

## 2018-01-29 RX ADMIN — DIVALPROEX SODIUM SCH: 250 TABLET, DELAYED RELEASE ORAL at 16:28

## 2018-01-29 RX ADMIN — DIVALPROEX SODIUM SCH MG: 250 TABLET, DELAYED RELEASE ORAL at 08:28

## 2018-01-29 RX ADMIN — DIVALPROEX SODIUM SCH MG: 250 TABLET, DELAYED RELEASE ORAL at 16:14

## 2018-01-29 NOTE — CP.PCM.PN
Subjective





- Date & Time of Evaluation


Date of Evaluation: 01/29/18


Time of Evaluation: 08:07





- Subjective


Subjective: 





s/o no f/c, n/v/d. no other complaints.calm and cooperative. no abd pain, 


a/p cont plan, cont placement, cont all medical management, cont psych meds


remains unable to make medical decisians for self. pending state disability/

guardianship/placement





Objective





- Vital Signs/Intake and Output


Vital Signs (last 24 hours): 


 











Temp Pulse Resp BP Pulse Ox


 


 98.1 F   87   17   113/72   98 


 


 01/28/18 21:12  01/28/18 21:12  01/28/18 21:12  01/28/18 21:12  01/28/18 21:12











- Medications


Medications: 


 Current Medications





Atorvastatin Calcium (Lipitor)  20 mg PO HS [facility]


   Last Admin: 01/28/18 21:12 Dose:  20 mg


Divalproex Sodium (Depakote Dr(*Bid*))  500 mg PO BID [facility]


   Last Admin: 01/28/18 16:16 Dose:  500 mg


Enalapril Maleate (Vasotec)  10 mg PO DAILY [facility]


   Last Admin: 01/28/18 08:29 Dose:  10 mg


Fenofibrate (Tricor)  145 mg PO DAILY [facility]


   Last Admin: 01/28/18 08:28 Dose:  145 mg


Haloperidol (Haldol)  1 mg PO Q8 PRN


   PRN Reason: Agitation


   Last Admin: 12/31/17 08:43 Dose:  1 mg


Haloperidol Lactate (Haldol)  1 mg IM Q8 PRN


   PRN Reason: Agitation


Metoprolol Tartrate (Lopressor)  50 mg PO Q12 [facility]


   Last Admin: 01/28/18 21:12 Dose:  50 mg


Quetiapine Fumarate (Seroquel)  50 mg PO Q12 [facility]


   Last Admin: 01/28/18 21:12 Dose:  50 mg











- Labs


Labs: 


 





 12/19/17 05:45 





 12/19/17 05:45 





 











PT  11.2 SECONDS (9.4-12.0)   12/14/15  05:20    


 


INR  1.05  (0.89-1.20)   12/14/15  05:20    


 


APTT  36.2 SECONDS (23.1-32.0)  H  12/14/15  05:20    














- Constitutional


Appears: Well, Non-toxic, No Acute Distress





- Head Exam


Head Exam: ATRAUMATIC, NORMAL INSPECTION, NORMOCEPHALIC





- Eye Exam


Eye Exam: EOMI, Normal appearance, PERRL


Pupil Exam: NORMAL ACCOMODATION, PERRL





- ENT Exam


ENT Exam: Mucous Membranes Moist, Normal Exam





- Neck Exam


Neck Exam: Full ROM, Normal Inspection.  absent: Lymphadenopathy





- Respiratory Exam


Respiratory Exam: Clear to Ausculation Bilateral, NORMAL BREATHING PATTERN





- Cardiovascular Exam


Cardiovascular Exam: REGULAR RHYTHM, RRR, +S1, +S2.  absent: Murmur





- GI/Abdominal Exam


GI & Abdominal Exam: Soft, Normal Bowel Sounds.  absent: Tenderness





- Extremities Exam


Extremities Exam: Full ROM, Normal Capillary Refill, Normal Inspection.  absent

: Joint Swelling, Pedal Edema





- Back Exam


Back Exam: NORMAL INSPECTION





- Neurological Exam


Neurological Exam: Alert, Awake, CN II-XII Intact, Normal Gait, Oriented x3





- Psychiatric Exam


Psychiatric exam: Normal Affect, Normal Mood





- Skin


Skin Exam: Dry, Intact, Normal Color, Warm





Assessment and Plan


(1) DVT prophylaxis


Status: Chronic   





(2) Scrotal edema


Status: Chronic   





(3) CAD (coronary artery disease)


Status: Chronic   





(4) Smoking


Status: Chronic   





(5) Low back pain


Status: Chronic   





(6) Prediabetes


Status: Chronic   





(7) Neurocognitive disorder


Status: Chronic

## 2018-01-30 RX ADMIN — DIVALPROEX SODIUM SCH MG: 250 TABLET, DELAYED RELEASE ORAL at 17:12

## 2018-01-30 RX ADMIN — DIVALPROEX SODIUM SCH MG: 250 TABLET, DELAYED RELEASE ORAL at 10:47

## 2018-01-30 NOTE — CP.PCM.PN
Subjective





- Date & Time of Evaluation


Date of Evaluation: 01/30/18


Time of Evaluation: 08:23





- Subjective


Subjective: 





s/o no f/c, n/v/d. no other complaints.calm and cooperative. no abd pain, 


a/p cont plan, cont placement, cont all medical management, cont psych meds


remains unable to make medical decisians for self. pending state disability/

guardianship/placement





Objective





- Vital Signs/Intake and Output


Vital Signs (last 24 hours): 


 











Temp Pulse Resp BP Pulse Ox


 


 98.5 F   91 H  18   106/71   96 


 


 01/30/18 00:35  01/30/18 00:35  01/30/18 00:35  01/30/18 00:35  01/30/18 00:35











- Medications


Medications: 


 Current Medications





Atorvastatin Calcium (Lipitor)  20 mg PO HS [facility]


   Last Admin: 01/29/18 21:31 Dose:  20 mg


Divalproex Sodium (Depakote Dr(*Bid*))  500 mg PO BID [facility]


   Last Admin: 01/29/18 16:28 Dose:  Not Given


Enalapril Maleate (Vasotec)  10 mg PO DAILY [facility]


   Last Admin: 01/29/18 08:28 Dose:  Not Given


Fenofibrate (Tricor)  145 mg PO DAILY [facility]


   Last Admin: 01/29/18 08:28 Dose:  145 mg


Haloperidol (Haldol)  1 mg PO Q8 PRN


   PRN Reason: Agitation


   Last Admin: 12/31/17 08:43 Dose:  1 mg


Haloperidol Lactate (Haldol)  1 mg IM Q8 PRN


   PRN Reason: Agitation


Metoprolol Tartrate (Lopressor)  50 mg PO Q12 [facility]


   Last Admin: 01/29/18 21:31 Dose:  50 mg


Quetiapine Fumarate (Seroquel)  50 mg PO Q12 [facility]


   Last Admin: 01/29/18 21:31 Dose:  50 mg











- Labs


Labs: 


 





 12/19/17 05:45 





 12/19/17 05:45 





 











PT  11.2 SECONDS (9.4-12.0)   12/14/15  05:20    


 


INR  1.05  (0.89-1.20)   12/14/15  05:20    


 


APTT  36.2 SECONDS (23.1-32.0)  H  12/14/15  05:20    














Assessment and Plan


(1) DVT prophylaxis


Status: Chronic   





(2) Scrotal edema


Status: Chronic   





(3) CAD (coronary artery disease)


Status: Chronic   





(4) Smoking


Status: Chronic   





(5) Low back pain


Status: Chronic   





(6) Prediabetes


Status: Chronic   





(7) Neurocognitive disorder


Status: Chronic

## 2018-01-31 RX ADMIN — DIVALPROEX SODIUM SCH MG: 250 TABLET, DELAYED RELEASE ORAL at 17:11

## 2018-01-31 RX ADMIN — DIVALPROEX SODIUM SCH MG: 250 TABLET, DELAYED RELEASE ORAL at 08:39

## 2018-01-31 NOTE — CP.PCM.PN
Subjective





- Date & Time of Evaluation


Date of Evaluation: 01/31/18


Time of Evaluation: 08:32





- Subjective


Subjective: 





s/o no f/c, n/v/d. no other complaints.calm and cooperative. no abd pain, 


a/p cont plan, cont placement, cont all medical management, cont psych meds


remains unable to make medical decisians for self. pending state disability/

guardianship/placement





Objective





- Vital Signs/Intake and Output


Vital Signs (last 24 hours): 


 











Temp Pulse Resp BP Pulse Ox


 


 97.6 F   68   20   109/73   90 L


 


 01/31/18 07:49  01/31/18 07:49  01/31/18 07:49  01/31/18 07:49  01/31/18 07:49











- Medications


Medications: 


 Current Medications





Atorvastatin Calcium (Lipitor)  20 mg PO HS [facility]


   Last Admin: 01/30/18 21:39 Dose:  20 mg


Divalproex Sodium (Depakote Dr(*Bid*))  500 mg PO BID [facility]


   Last Admin: 01/30/18 17:12 Dose:  500 mg


Enalapril Maleate (Vasotec)  10 mg PO DAILY [facility]


   Last Admin: 01/30/18 10:48 Dose:  10 mg


Fenofibrate (Tricor)  145 mg PO DAILY [facility]


   Last Admin: 01/30/18 10:47 Dose:  145 mg


Haloperidol (Haldol)  1 mg PO Q8 PRN


   PRN Reason: Agitation


   Last Admin: 12/31/17 08:43 Dose:  1 mg


Haloperidol Lactate (Haldol)  1 mg IM Q8 PRN


   PRN Reason: Agitation


Metoprolol Tartrate (Lopressor)  50 mg PO Q12 [facility]


   Last Admin: 01/30/18 21:40 Dose:  50 mg


Quetiapine Fumarate (Seroquel)  50 mg PO Q12 [facility]


   Last Admin: 01/30/18 21:38 Dose:  50 mg











- Labs


Labs: 


 





 12/19/17 05:45 





 12/19/17 05:45 





 











PT  11.2 SECONDS (9.4-12.0)   12/14/15  05:20    


 


INR  1.05  (0.89-1.20)   12/14/15  05:20    


 


APTT  36.2 SECONDS (23.1-32.0)  H  12/14/15  05:20    














- Constitutional


Appears: Well, Non-toxic, No Acute Distress





- Head Exam


Head Exam: ATRAUMATIC, NORMAL INSPECTION, NORMOCEPHALIC





- Eye Exam


Eye Exam: EOMI, Normal appearance, PERRL


Pupil Exam: NORMAL ACCOMODATION, PERRL





- ENT Exam


ENT Exam: Mucous Membranes Moist, Normal Exam





- Neck Exam


Neck Exam: Full ROM, Normal Inspection.  absent: Lymphadenopathy





- Respiratory Exam


Respiratory Exam: Clear to Ausculation Bilateral, NORMAL BREATHING PATTERN





- Cardiovascular Exam


Cardiovascular Exam: REGULAR RHYTHM, RRR, +S1, +S2.  absent: Murmur





- GI/Abdominal Exam


GI & Abdominal Exam: Soft, Normal Bowel Sounds.  absent: Tenderness





- Extremities Exam


Extremities Exam: Full ROM, Normal Capillary Refill, Normal Inspection.  absent

: Joint Swelling, Pedal Edema





- Back Exam


Back Exam: NORMAL INSPECTION





- Neurological Exam


Neurological Exam: Alert, Awake, CN II-XII Intact, Normal Gait, Oriented x3





- Psychiatric Exam


Psychiatric exam: Normal Affect, Normal Mood





- Skin


Skin Exam: Dry, Intact, Normal Color, Warm





Assessment and Plan


(1) DVT prophylaxis


Status: Chronic   





(2) Scrotal edema


Status: Chronic   





(3) CAD (coronary artery disease)


Status: Chronic   





(4) Smoking


Status: Chronic   





(5) Low back pain


Status: Chronic   





(6) Prediabetes


Status: Chronic   





(7) Neurocognitive disorder


Status: Chronic

## 2018-02-01 RX ADMIN — DIVALPROEX SODIUM SCH MG: 250 TABLET, DELAYED RELEASE ORAL at 10:35

## 2018-02-01 RX ADMIN — DIVALPROEX SODIUM SCH MG: 250 TABLET, DELAYED RELEASE ORAL at 17:08

## 2018-02-01 NOTE — CP.PCM.PN
Subjective





- Date & Time of Evaluation


Date of Evaluation: 02/01/18


Time of Evaluation: 08:14





- Subjective


Subjective: 





s/o no f/c, n/v/d. no other complaints.calm and cooperative. no abd pain, 


a/p cont plan, cont placement, cont all medical management, cont psych meds


remains unable to make medical decisians for self. pending state disability/

guardianship/placement





Objective





- Vital Signs/Intake and Output


Vital Signs (last 24 hours): 


 











Temp Pulse Resp BP Pulse Ox


 


 98 F   81   20   108/81   99 


 


 02/01/18 07:45  02/01/18 07:45  02/01/18 07:45  02/01/18 07:45  02/01/18 07:45











- Medications


Medications: 


 Current Medications





Atorvastatin Calcium (Lipitor)  20 mg PO HS [facility]


   Last Admin: 01/31/18 21:12 Dose:  20 mg


Divalproex Sodium (Depakote Dr(*Bid*))  500 mg PO BID [facility]


   Last Admin: 01/31/18 17:11 Dose:  500 mg


Enalapril Maleate (Vasotec)  10 mg PO DAILY [facility]


   Last Admin: 01/31/18 08:39 Dose:  10 mg


Fenofibrate (Tricor)  145 mg PO DAILY [facility]


   Last Admin: 01/31/18 08:39 Dose:  145 mg


Haloperidol (Haldol)  1 mg PO Q8 PRN


   PRN Reason: Agitation


   Last Admin: 12/31/17 08:43 Dose:  1 mg


Haloperidol Lactate (Haldol)  1 mg IM Q8 PRN


   PRN Reason: Agitation


Metoprolol Tartrate (Lopressor)  50 mg PO Q12 [facility]


   Last Admin: 01/31/18 21:12 Dose:  Not Given


Quetiapine Fumarate (Seroquel)  50 mg PO Q12 [facility]


   Last Admin: 01/31/18 21:12 Dose:  50 mg











- Labs


Labs: 


 





 12/19/17 05:45 





 12/19/17 05:45 





 











PT  11.2 SECONDS (9.4-12.0)   12/14/15  05:20    


 


INR  1.05  (0.89-1.20)   12/14/15  05:20    


 


APTT  36.2 SECONDS (23.1-32.0)  H  12/14/15  05:20    














- Constitutional


Appears: Well, Non-toxic, No Acute Distress





- Head Exam


Head Exam: ATRAUMATIC, NORMAL INSPECTION, NORMOCEPHALIC





- Eye Exam


Eye Exam: EOMI, Normal appearance, PERRL


Pupil Exam: NORMAL ACCOMODATION, PERRL





- ENT Exam


ENT Exam: Mucous Membranes Moist, Normal Exam





- Neck Exam


Neck Exam: Full ROM, Normal Inspection.  absent: Lymphadenopathy





- Respiratory Exam


Respiratory Exam: Clear to Ausculation Bilateral, NORMAL BREATHING PATTERN





- Cardiovascular Exam


Cardiovascular Exam: REGULAR RHYTHM, RRR, +S1, +S2.  absent: Murmur





- GI/Abdominal Exam


GI & Abdominal Exam: Soft, Normal Bowel Sounds.  absent: Tenderness





- Extremities Exam


Extremities Exam: Full ROM, Normal Capillary Refill, Normal Inspection.  absent

: Joint Swelling, Pedal Edema





- Back Exam


Back Exam: NORMAL INSPECTION





- Neurological Exam


Neurological Exam: Alert, Awake, CN II-XII Intact, Normal Gait, Oriented x3





- Psychiatric Exam


Psychiatric exam: Normal Affect, Normal Mood





- Skin


Skin Exam: Dry, Intact, Normal Color, Warm





Assessment and Plan


(1) DVT prophylaxis


Status: Chronic   





(2) Scrotal edema


Status: Chronic   





(3) CAD (coronary artery disease)


Status: Chronic   





(4) Smoking


Status: Chronic   





(5) Low back pain


Status: Chronic   





(6) Prediabetes


Status: Chronic   





(7) Neurocognitive disorder


Status: Chronic

## 2018-02-02 RX ADMIN — DIVALPROEX SODIUM SCH MG: 250 TABLET, DELAYED RELEASE ORAL at 11:33

## 2018-02-02 RX ADMIN — DIVALPROEX SODIUM SCH MG: 250 TABLET, DELAYED RELEASE ORAL at 16:37

## 2018-02-02 NOTE — CP.PCM.PN
Subjective





- Date & Time of Evaluation


Date of Evaluation: 02/02/18


Time of Evaluation: 08:35





- Subjective


Subjective: 





s/o no f/c, n/v/d. no other complaints.calm and cooperative. no abd pain, 


a/p cont plan, cont placement, cont all medical management, cont psych meds


remains unable to make medical decisians for self. pending state disability/

guardianship/placement





Objective





- Vital Signs/Intake and Output


Vital Signs (last 24 hours): 


 











Temp Pulse Resp BP Pulse Ox


 


 97.9 F   81   18   94/60 L  95 


 


 02/01/18 23:53  02/01/18 23:53  02/01/18 23:53  02/01/18 23:53  02/01/18 23:53











- Medications


Medications: 


 Current Medications





Atorvastatin Calcium (Lipitor)  20 mg PO HS [facility]


   Last Admin: 02/01/18 22:37 Dose:  20 mg


Divalproex Sodium (Depakote Dr(*Bid*))  500 mg PO BID [facility]


   Last Admin: 02/01/18 17:08 Dose:  500 mg


Enalapril Maleate (Vasotec)  10 mg PO DAILY [facility]


   Last Admin: 02/01/18 10:36 Dose:  10 mg


Fenofibrate (Tricor)  145 mg PO DAILY [facility]


   Last Admin: 02/01/18 10:34 Dose:  145 mg


Haloperidol (Haldol)  1 mg PO Q8 PRN


   PRN Reason: Agitation


   Last Admin: 02/01/18 10:35 Dose:  1 mg


Haloperidol Lactate (Haldol)  1 mg IM Q8 PRN


   PRN Reason: Agitation


Metoprolol Tartrate (Lopressor)  50 mg PO Q12 [facility]


   Last Admin: 02/01/18 22:38 Dose:  Not Given


Quetiapine Fumarate (Seroquel)  50 mg PO Q12 [facility]


   Last Admin: 02/01/18 22:38 Dose:  50 mg











- Labs


Labs: 


 





 12/19/17 05:45 





 12/19/17 05:45 





 











PT  11.2 SECONDS (9.4-12.0)   12/14/15  05:20    


 


INR  1.05  (0.89-1.20)   12/14/15  05:20    


 


APTT  36.2 SECONDS (23.1-32.0)  H  12/14/15  05:20    














- Constitutional


Appears: Well, Non-toxic, No Acute Distress





- Head Exam


Head Exam: ATRAUMATIC, NORMAL INSPECTION, NORMOCEPHALIC





- Eye Exam


Eye Exam: EOMI, Normal appearance, PERRL


Pupil Exam: NORMAL ACCOMODATION, PERRL





- ENT Exam


ENT Exam: Mucous Membranes Moist, Normal Exam





- Neck Exam


Neck Exam: Full ROM, Normal Inspection.  absent: Lymphadenopathy





- Respiratory Exam


Respiratory Exam: Clear to Ausculation Bilateral, NORMAL BREATHING PATTERN





- Cardiovascular Exam


Cardiovascular Exam: REGULAR RHYTHM, RRR, +S1, +S2.  absent: Murmur





- GI/Abdominal Exam


GI & Abdominal Exam: Soft, Normal Bowel Sounds.  absent: Tenderness





- Extremities Exam


Extremities Exam: Full ROM, Normal Capillary Refill, Normal Inspection.  absent

: Joint Swelling, Pedal Edema





- Back Exam


Back Exam: NORMAL INSPECTION





- Neurological Exam


Neurological Exam: Alert, Awake, CN II-XII Intact, Normal Gait, Oriented x3





- Psychiatric Exam


Psychiatric exam: Normal Affect, Normal Mood





- Skin


Skin Exam: Dry, Intact, Normal Color, Warm





Assessment and Plan


(1) DVT prophylaxis


Status: Chronic   





(2) Scrotal edema


Status: Chronic   





(3) CAD (coronary artery disease)


Status: Chronic   





(4) Smoking


Status: Chronic   





(5) Low back pain


Status: Chronic   





(6) Prediabetes


Status: Chronic   





(7) Neurocognitive disorder


Status: Chronic

## 2018-02-03 RX ADMIN — DIVALPROEX SODIUM SCH MG: 250 TABLET, DELAYED RELEASE ORAL at 16:59

## 2018-02-03 RX ADMIN — DIVALPROEX SODIUM SCH MG: 250 TABLET, DELAYED RELEASE ORAL at 09:45

## 2018-02-03 NOTE — CP.PCM.PN
Subjective





- Date & Time of Evaluation


Date of Evaluation: 02/03/18


Time of Evaluation: 08:12





- Subjective


Subjective: 





s/o no f/c, n/v/d. no other complaints.calm and cooperative. no abd pain, 


a/p cont plan, cont placement, cont all medical management, cont psych meds


remains unable to make medical decisians for self. pending state disability/

guardianship/placement





Objective





- Vital Signs/Intake and Output


Vital Signs (last 24 hours): 


 











Temp Pulse Resp BP Pulse Ox


 


 97.7 F   74   19   112/74   98 


 


 02/03/18 00:00  02/03/18 00:00  02/03/18 00:00  02/03/18 00:00  02/03/18 00:00











- Medications


Medications: 


 Current Medications





Atorvastatin Calcium (Lipitor)  20 mg PO HS [facility]


   Last Admin: 02/02/18 21:16 Dose:  20 mg


Divalproex Sodium (Depakote Dr(*Bid*))  500 mg PO BID [facility]


   Last Admin: 02/02/18 16:37 Dose:  500 mg


Enalapril Maleate (Vasotec)  10 mg PO DAILY [facility]


   Last Admin: 02/02/18 11:34 Dose:  10 mg


Fenofibrate (Tricor)  145 mg PO DAILY [facility]


   Last Admin: 02/02/18 11:35 Dose:  145 mg


Haloperidol (Haldol)  1 mg PO Q8 PRN


   PRN Reason: Agitation


   Last Admin: 02/01/18 10:35 Dose:  1 mg


Haloperidol Lactate (Haldol)  1 mg IM Q8 PRN


   PRN Reason: Agitation


Metoprolol Tartrate (Lopressor)  50 mg PO Q12 [facility]


   Last Admin: 02/02/18 21:16 Dose:  50 mg


Quetiapine Fumarate (Seroquel)  50 mg PO Q12 [facility]


   Last Admin: 02/02/18 21:16 Dose:  50 mg











- Labs


Labs: 


 





 12/19/17 05:45 





 12/19/17 05:45 





 











PT  11.2 SECONDS (9.4-12.0)   12/14/15  05:20    


 


INR  1.05  (0.89-1.20)   12/14/15  05:20    


 


APTT  36.2 SECONDS (23.1-32.0)  H  12/14/15  05:20    














- Constitutional


Appears: Well, Non-toxic, No Acute Distress





- Head Exam


Head Exam: ATRAUMATIC, NORMAL INSPECTION, NORMOCEPHALIC





- Eye Exam


Eye Exam: EOMI, Normal appearance, PERRL


Pupil Exam: NORMAL ACCOMODATION, PERRL





- ENT Exam


ENT Exam: Mucous Membranes Moist, Normal Exam





- Neck Exam


Neck Exam: Full ROM, Normal Inspection.  absent: Lymphadenopathy





- Respiratory Exam


Respiratory Exam: Clear to Ausculation Bilateral, NORMAL BREATHING PATTERN





- Cardiovascular Exam


Cardiovascular Exam: REGULAR RHYTHM, RRR, +S1, +S2.  absent: Murmur





- GI/Abdominal Exam


GI & Abdominal Exam: Soft, Normal Bowel Sounds.  absent: Tenderness





- Extremities Exam


Extremities Exam: Full ROM, Normal Capillary Refill, Normal Inspection.  absent

: Joint Swelling, Pedal Edema





- Back Exam


Back Exam: NORMAL INSPECTION





- Neurological Exam


Neurological Exam: Alert, Awake, CN II-XII Intact, Normal Gait, Oriented x3





- Psychiatric Exam


Psychiatric exam: Normal Affect, Normal Mood





- Skin


Skin Exam: Dry, Intact, Normal Color, Warm





Assessment and Plan


(1) DVT prophylaxis


Status: Chronic   





(2) Scrotal edema


Status: Chronic   





(3) CAD (coronary artery disease)


Status: Chronic   





(4) Smoking


Status: Chronic   





(5) Low back pain


Status: Chronic   





(6) Prediabetes


Status: Chronic   





(7) Neurocognitive disorder


Status: Chronic

## 2018-02-04 RX ADMIN — DIVALPROEX SODIUM SCH MG: 250 TABLET, DELAYED RELEASE ORAL at 16:48

## 2018-02-04 RX ADMIN — DIVALPROEX SODIUM SCH MG: 250 TABLET, DELAYED RELEASE ORAL at 09:39

## 2018-02-04 RX ADMIN — DIVALPROEX SODIUM SCH MG: 250 TABLET, DELAYED RELEASE ORAL at 18:32

## 2018-02-04 NOTE — CP.PCM.PN
Subjective





- Date & Time of Evaluation


Date of Evaluation: 02/04/18


Time of Evaluation: 09:05





- Subjective


Subjective: 





s/o no f/c, n/v/d. no other complaints.calm and cooperative. no abd pain, 


a/p cont plan, cont placement, cont all medical management, cont psych meds


remains unable to make medical decisians for self. pending state disability/

guardianship/placement





Objective





- Vital Signs/Intake and Output


Vital Signs (last 24 hours): 


 











Temp Pulse Resp BP Pulse Ox


 


 98.0 F   75   18   94/63 L  96 


 


 02/04/18 07:57  02/04/18 07:57  02/04/18 07:57  02/04/18 07:57  02/04/18 07:57











- Medications


Medications: 


 Current Medications





Atorvastatin Calcium (Lipitor)  20 mg PO HS [facility]


   Last Admin: 02/03/18 21:42 Dose:  20 mg


Divalproex Sodium (Depakote Dr(*Bid*))  500 mg PO BID [facility]


   Last Admin: 02/03/18 16:59 Dose:  500 mg


Enalapril Maleate (Vasotec)  10 mg PO DAILY [facility]


   Last Admin: 02/03/18 09:47 Dose:  10 mg


Fenofibrate (Tricor)  145 mg PO DAILY [facility]


   Last Admin: 02/03/18 09:47 Dose:  145 mg


Haloperidol (Haldol)  1 mg PO Q8 PRN


   PRN Reason: Agitation


   Last Admin: 02/01/18 10:35 Dose:  1 mg


Haloperidol Lactate (Haldol)  1 mg IM Q8 PRN


   PRN Reason: Agitation


Metoprolol Tartrate (Lopressor)  50 mg PO Q12 [facility]


   Last Admin: 02/03/18 21:49 Dose:  50 mg


Quetiapine Fumarate (Seroquel)  50 mg PO Q12 [facility]


   Last Admin: 02/03/18 21:42 Dose:  50 mg











- Labs


Labs: 


 





 12/19/17 05:45 





 12/19/17 05:45 





 











PT  11.2 SECONDS (9.4-12.0)   12/14/15  05:20    


 


INR  1.05  (0.89-1.20)   12/14/15  05:20    


 


APTT  36.2 SECONDS (23.1-32.0)  H  12/14/15  05:20    














- Constitutional


Appears: Well, Non-toxic, No Acute Distress





- Head Exam


Head Exam: ATRAUMATIC, NORMAL INSPECTION, NORMOCEPHALIC





- Eye Exam


Eye Exam: EOMI, Normal appearance, PERRL


Pupil Exam: NORMAL ACCOMODATION, PERRL





- ENT Exam


ENT Exam: Mucous Membranes Moist, Normal Exam





- Neck Exam


Neck Exam: Full ROM, Normal Inspection.  absent: Lymphadenopathy





- Respiratory Exam


Respiratory Exam: Clear to Ausculation Bilateral, NORMAL BREATHING PATTERN





- Cardiovascular Exam


Cardiovascular Exam: REGULAR RHYTHM, RRR, +S1, +S2.  absent: Murmur





- GI/Abdominal Exam


GI & Abdominal Exam: Soft, Normal Bowel Sounds.  absent: Tenderness





- Extremities Exam


Extremities Exam: Full ROM, Normal Capillary Refill, Normal Inspection.  absent

: Joint Swelling, Pedal Edema





- Back Exam


Back Exam: NORMAL INSPECTION





- Neurological Exam


Neurological Exam: Alert, Awake, CN II-XII Intact, Normal Gait, Oriented x3





- Psychiatric Exam


Psychiatric exam: Normal Affect, Normal Mood





- Skin


Skin Exam: Dry, Intact, Normal Color, Warm





Assessment and Plan


(1) DVT prophylaxis


Status: Chronic   





(2) Scrotal edema


Status: Chronic   





(3) CAD (coronary artery disease)


Status: Chronic   





(4) Smoking


Status: Chronic   





(5) Low back pain


Status: Chronic   





(6) Prediabetes


Status: Chronic   





(7) Neurocognitive disorder


Status: Chronic

## 2018-02-05 RX ADMIN — DIVALPROEX SODIUM SCH MG: 250 TABLET, DELAYED RELEASE ORAL at 08:39

## 2018-02-05 RX ADMIN — DIVALPROEX SODIUM SCH MG: 250 TABLET, DELAYED RELEASE ORAL at 16:07

## 2018-02-05 NOTE — CP.PCM.PN
Subjective





- Date & Time of Evaluation


Date of Evaluation: 02/05/18


Time of Evaluation: 07:53





- Subjective


Subjective: 





s/o no f/c, n/v/d. no other complaints.calm and cooperative. no abd pain, 


a/p cont plan, cont placement, cont all medical management, cont psych meds


remains unable to make medical decisians for self. pending state disability/

guardianship/placement





Objective





- Vital Signs/Intake and Output


Vital Signs (last 24 hours): 


 











Temp Pulse Resp BP Pulse Ox


 


 98.1 F   88   19   95/63 L  97 


 


 02/05/18 00:00  02/05/18 00:00  02/05/18 00:00  02/05/18 00:00  02/05/18 00:00











- Medications


Medications: 


 Current Medications





Atorvastatin Calcium (Lipitor)  20 mg PO HS [facility]


   Last Admin: 02/04/18 20:59 Dose:  20 mg


Divalproex Sodium (Depakote Dr(*Bid*))  500 mg PO BID [facility]


   Last Admin: 02/04/18 18:32 Dose:  500 mg


Enalapril Maleate (Vasotec)  10 mg PO DAILY [facility]


   Last Admin: 02/04/18 18:32 Dose:  10 mg


Fenofibrate (Tricor)  145 mg PO DAILY [facility]


   Last Admin: 02/04/18 09:40 Dose:  145 mg


Haloperidol (Haldol)  1 mg PO Q8 PRN


   PRN Reason: Agitation


   Last Admin: 02/01/18 10:35 Dose:  1 mg


Haloperidol Lactate (Haldol)  1 mg IM Q8 PRN


   PRN Reason: Agitation


Metoprolol Tartrate (Lopressor)  50 mg PO Q12 [facility]


   Last Admin: 02/04/18 21:00 Dose:  50 mg


Quetiapine Fumarate (Seroquel)  50 mg PO Q12 [facility]


   Last Admin: 02/04/18 20:59 Dose:  50 mg











- Labs


Labs: 


 





 12/19/17 05:45 





 12/19/17 05:45 





 











PT  11.2 SECONDS (9.4-12.0)   12/14/15  05:20    


 


INR  1.05  (0.89-1.20)   12/14/15  05:20    


 


APTT  36.2 SECONDS (23.1-32.0)  H  12/14/15  05:20    














- Constitutional


Appears: Well, Non-toxic, No Acute Distress





- Head Exam


Head Exam: ATRAUMATIC, NORMAL INSPECTION, NORMOCEPHALIC





- Eye Exam


Eye Exam: EOMI, Normal appearance, PERRL


Pupil Exam: NORMAL ACCOMODATION, PERRL





- ENT Exam


ENT Exam: Mucous Membranes Moist, Normal Exam





- Neck Exam


Neck Exam: Full ROM, Normal Inspection.  absent: Lymphadenopathy





- Respiratory Exam


Respiratory Exam: Clear to Ausculation Bilateral, NORMAL BREATHING PATTERN





- Cardiovascular Exam


Cardiovascular Exam: REGULAR RHYTHM, RRR, +S1, +S2.  absent: Murmur





- GI/Abdominal Exam


GI & Abdominal Exam: Soft, Normal Bowel Sounds.  absent: Tenderness





- Extremities Exam


Extremities Exam: Full ROM, Normal Capillary Refill, Normal Inspection.  absent

: Joint Swelling, Pedal Edema





- Back Exam


Back Exam: NORMAL INSPECTION





- Neurological Exam


Neurological Exam: Alert, Awake, CN II-XII Intact, Normal Gait, Oriented x3





- Psychiatric Exam


Psychiatric exam: Normal Affect, Normal Mood





- Skin


Skin Exam: Dry, Intact, Normal Color, Warm





Assessment and Plan


(1) DVT prophylaxis


Status: Chronic   





(2) Scrotal edema


Status: Chronic   





(3) CAD (coronary artery disease)


Status: Chronic   





(4) Smoking


Status: Chronic   





(5) Low back pain


Status: Chronic   





(6) Prediabetes


Status: Chronic   





(7) Neurocognitive disorder


Status: Chronic

## 2018-02-06 RX ADMIN — DIVALPROEX SODIUM SCH MG: 250 TABLET, DELAYED RELEASE ORAL at 09:15

## 2018-02-06 RX ADMIN — DIVALPROEX SODIUM SCH MG: 250 TABLET, DELAYED RELEASE ORAL at 16:01

## 2018-02-06 NOTE — CP.PCM.PN
Subjective





- Date & Time of Evaluation


Date of Evaluation: 02/06/18


Time of Evaluation: 08:04





- Subjective


Subjective: 





s/o no f/c, n/v/d. no other complaints.calm and cooperative. no abd pain, 


a/p cont plan, cont placement, cont all medical management, cont psych meds


remains unable to make medical decisians for self. pending state disability/

guardianship/placement





Objective





- Vital Signs/Intake and Output


Vital Signs (last 24 hours): 


 











Temp Pulse Resp BP Pulse Ox


 


 97.5 F L  69   20   104/71   95 


 


 02/06/18 01:04  02/06/18 01:04  02/06/18 01:04  02/06/18 01:04  02/06/18 01:04











- Medications


Medications: 


 Current Medications





Atorvastatin Calcium (Lipitor)  20 mg PO HS [facility]


   Last Admin: 02/05/18 21:52 Dose:  20 mg


Divalproex Sodium (Depakote Dr(*Bid*))  500 mg PO BID [facility]


   Last Admin: 02/05/18 16:07 Dose:  500 mg


Enalapril Maleate (Vasotec)  10 mg PO DAILY [facility]


   Last Admin: 02/05/18 08:40 Dose:  Not Given


Fenofibrate (Tricor)  145 mg PO DAILY [facility]


   Last Admin: 02/05/18 08:40 Dose:  145 mg


Haloperidol (Haldol)  1 mg PO Q8 PRN


   PRN Reason: Agitation


   Last Admin: 02/01/18 10:35 Dose:  1 mg


Haloperidol Lactate (Haldol)  1 mg IM Q8 PRN


   PRN Reason: Agitation


Metoprolol Tartrate (Lopressor)  50 mg PO Q12 [facility]


   Last Admin: 02/05/18 21:48 Dose:  50 mg


Quetiapine Fumarate (Seroquel)  50 mg PO Q12 [facility]


   Last Admin: 02/05/18 21:51 Dose:  50 mg











- Labs


Labs: 


 





 12/19/17 05:45 





 12/19/17 05:45 





 











PT  11.2 SECONDS (9.4-12.0)   12/14/15  05:20    


 


INR  1.05  (0.89-1.20)   12/14/15  05:20    


 


APTT  36.2 SECONDS (23.1-32.0)  H  12/14/15  05:20    














- Constitutional


Appears: Well, Non-toxic, No Acute Distress





- Head Exam


Head Exam: ATRAUMATIC, NORMAL INSPECTION, NORMOCEPHALIC





- Eye Exam


Eye Exam: EOMI, Normal appearance, PERRL


Pupil Exam: NORMAL ACCOMODATION, PERRL





- ENT Exam


ENT Exam: Mucous Membranes Moist, Normal Exam





- Neck Exam


Neck Exam: Full ROM, Normal Inspection.  absent: Lymphadenopathy





- Respiratory Exam


Respiratory Exam: Clear to Ausculation Bilateral, NORMAL BREATHING PATTERN





- Cardiovascular Exam


Cardiovascular Exam: REGULAR RHYTHM, RRR, +S1, +S2.  absent: Murmur





- GI/Abdominal Exam


GI & Abdominal Exam: Soft, Normal Bowel Sounds.  absent: Tenderness





- Extremities Exam


Extremities Exam: Full ROM, Normal Capillary Refill, Normal Inspection.  absent

: Joint Swelling, Pedal Edema





- Back Exam


Back Exam: NORMAL INSPECTION





- Neurological Exam


Neurological Exam: Alert, Awake, CN II-XII Intact, Normal Gait, Oriented x3





- Psychiatric Exam


Psychiatric exam: Normal Affect, Normal Mood





- Skin


Skin Exam: Dry, Intact, Normal Color, Warm





Assessment and Plan


(1) DVT prophylaxis


Status: Chronic   





(2) Scrotal edema


Status: Chronic   





(3) CAD (coronary artery disease)


Status: Chronic   





(4) Smoking


Status: Chronic   





(5) Low back pain


Status: Chronic   





(6) Prediabetes


Status: Chronic   





(7) Neurocognitive disorder


Status: Chronic

## 2018-02-07 RX ADMIN — DIVALPROEX SODIUM SCH MG: 250 TABLET, DELAYED RELEASE ORAL at 16:04

## 2018-02-07 RX ADMIN — DIVALPROEX SODIUM SCH MG: 250 TABLET, DELAYED RELEASE ORAL at 08:27

## 2018-02-08 RX ADMIN — DIVALPROEX SODIUM SCH MG: 250 TABLET, DELAYED RELEASE ORAL at 16:41

## 2018-02-08 RX ADMIN — DIVALPROEX SODIUM SCH MG: 250 TABLET, DELAYED RELEASE ORAL at 08:25

## 2018-02-08 NOTE — CP.PCM.PN
Subjective





- Date & Time of Evaluation


Date of Evaluation: 02/08/18


Time of Evaluation: 08:28





- Subjective


Subjective: 





s/o no f/c, n/v/d. no other complaints.calm and cooperative. no abd pain, 


a/p cont plan, cont placement, cont all medical management, cont psych meds


remains unable to make medical decisians for self. pending state disability/

guardianship/placement





Objective





- Vital Signs/Intake and Output


Vital Signs (last 24 hours): 


 











Temp Pulse Resp BP Pulse Ox


 


 97.5 F L  70   19   105/70   97 


 


 02/08/18 07:58  02/08/18 07:58  02/08/18 07:58  02/08/18 07:58  02/08/18 07:58











- Medications


Medications: 


 Current Medications





Atorvastatin Calcium (Lipitor)  20 mg PO HS [facility]


   Last Admin: 02/07/18 21:25 Dose:  20 mg


Divalproex Sodium (Depakote Dr(*Bid*))  500 mg PO BID [facility]


   Last Admin: 02/07/18 16:04 Dose:  500 mg


Enalapril Maleate (Vasotec)  10 mg PO DAILY [facility]


   Last Admin: 02/07/18 08:27 Dose:  10 mg


Fenofibrate (Tricor)  145 mg PO DAILY [facility]


   Last Admin: 02/07/18 08:27 Dose:  145 mg


Haloperidol (Haldol)  1 mg PO Q8 PRN


   PRN Reason: Agitation


   Last Admin: 02/01/18 10:35 Dose:  1 mg


Haloperidol Lactate (Haldol)  1 mg IM Q8 PRN


   PRN Reason: Agitation


Metoprolol Tartrate (Lopressor)  50 mg PO Q12 [facility]


   Last Admin: 02/07/18 20:39 Dose:  50 mg


Quetiapine Fumarate (Seroquel)  50 mg PO Q12 [facility]


   Last Admin: 02/07/18 20:39 Dose:  50 mg











- Labs


Labs: 


 





 12/19/17 05:45 





 12/19/17 05:45 





 











PT  11.2 SECONDS (9.4-12.0)   12/14/15  05:20    


 


INR  1.05  (0.89-1.20)   12/14/15  05:20    


 


APTT  36.2 SECONDS (23.1-32.0)  H  12/14/15  05:20    














- Constitutional


Appears: Well, Non-toxic, No Acute Distress





- Head Exam


Head Exam: ATRAUMATIC, NORMAL INSPECTION, NORMOCEPHALIC





- Eye Exam


Eye Exam: EOMI, Normal appearance, PERRL


Pupil Exam: NORMAL ACCOMODATION, PERRL





- ENT Exam


ENT Exam: Mucous Membranes Moist, Normal Exam





- Neck Exam


Neck Exam: Full ROM, Normal Inspection.  absent: Lymphadenopathy





- Respiratory Exam


Respiratory Exam: Clear to Ausculation Bilateral, NORMAL BREATHING PATTERN





- Cardiovascular Exam


Cardiovascular Exam: REGULAR RHYTHM, RRR, +S1, +S2.  absent: Murmur





- GI/Abdominal Exam


GI & Abdominal Exam: Soft, Normal Bowel Sounds.  absent: Tenderness





- Extremities Exam


Extremities Exam: Full ROM, Normal Capillary Refill, Normal Inspection.  absent

: Joint Swelling, Pedal Edema





- Back Exam


Back Exam: NORMAL INSPECTION





- Neurological Exam


Neurological Exam: Alert, Awake, CN II-XII Intact, Normal Gait, Oriented x3





- Psychiatric Exam


Psychiatric exam: Normal Affect, Normal Mood





- Skin


Skin Exam: Dry, Intact, Normal Color, Warm





Assessment and Plan


(1) DVT prophylaxis


Status: Chronic   





(2) Scrotal edema


Status: Chronic   





(3) CAD (coronary artery disease)


Status: Chronic   





(4) Smoking


Status: Chronic   





(5) Low back pain


Status: Chronic   





(6) Prediabetes


Status: Chronic   





(7) Neurocognitive disorder


Status: Chronic

## 2018-02-08 NOTE — CP.PCM.PN
Subjective





- Date & Time of Evaluation


Date of Evaluation: 02/06/18


Time of Evaluation: 08:27





- Subjective


Subjective: 





s/o no f/c, n/v/d. no other complaints.calm and cooperative. no abd pain, 


a/p cont plan, cont placement, cont all medical management, cont psych meds


remains unable to make medical decisians for self. pending state disability/

guardianship/placement





Objective





- Vital Signs/Intake and Output


Vital Signs (last 24 hours): 


 











Temp Pulse Resp BP Pulse Ox


 


 97.5 F L  70   19   105/70   97 


 


 02/08/18 07:58  02/08/18 07:58  02/08/18 07:58  02/08/18 07:58  02/08/18 07:58











- Medications


Medications: 


 Current Medications





Atorvastatin Calcium (Lipitor)  20 mg PO HS [facility]


   Last Admin: 02/07/18 21:25 Dose:  20 mg


Divalproex Sodium (Depakote Dr(*Bid*))  500 mg PO BID [facility]


   Last Admin: 02/07/18 16:04 Dose:  500 mg


Enalapril Maleate (Vasotec)  10 mg PO DAILY [facility]


   Last Admin: 02/07/18 08:27 Dose:  10 mg


Fenofibrate (Tricor)  145 mg PO DAILY [facility]


   Last Admin: 02/07/18 08:27 Dose:  145 mg


Haloperidol (Haldol)  1 mg PO Q8 PRN


   PRN Reason: Agitation


   Last Admin: 02/01/18 10:35 Dose:  1 mg


Haloperidol Lactate (Haldol)  1 mg IM Q8 PRN


   PRN Reason: Agitation


Metoprolol Tartrate (Lopressor)  50 mg PO Q12 [facility]


   Last Admin: 02/07/18 20:39 Dose:  50 mg


Quetiapine Fumarate (Seroquel)  50 mg PO Q12 [facility]


   Last Admin: 02/07/18 20:39 Dose:  50 mg











- Labs


Labs: 


 





 12/19/17 05:45 





 12/19/17 05:45 





 











PT  11.2 SECONDS (9.4-12.0)   12/14/15  05:20    


 


INR  1.05  (0.89-1.20)   12/14/15  05:20    


 


APTT  36.2 SECONDS (23.1-32.0)  H  12/14/15  05:20    














Assessment and Plan


(1) DVT prophylaxis


Status: Chronic   





(2) Scrotal edema


Status: Chronic   





(3) CAD (coronary artery disease)


Status: Chronic   





(4) Smoking


Status: Chronic   





(5) Low back pain


Status: Chronic   





(6) Prediabetes


Status: Chronic   





(7) Neurocognitive disorder


Status: Chronic

## 2018-02-09 RX ADMIN — DIVALPROEX SODIUM SCH MG: 250 TABLET, DELAYED RELEASE ORAL at 09:11

## 2018-02-09 RX ADMIN — DIVALPROEX SODIUM SCH MG: 250 TABLET, DELAYED RELEASE ORAL at 16:18

## 2018-02-09 NOTE — CP.PCM.PN
Subjective





- Date & Time of Evaluation


Date of Evaluation: 02/09/18


Time of Evaluation: 11:20





- Subjective


Subjective: 





s/o no f/c, n/v/d. no other complaints.calm and cooperative. no abd pain, 


a/p cont plan, cont placement, cont all medical management, cont psych meds


remains unable to make medical decisians for self. pending state disability/

guardianship/placement








Objective





- Vital Signs/Intake and Output


Vital Signs (last 24 hours): 


 











Temp Pulse Resp BP Pulse Ox


 


 97.3 F L  73   19   102/67   97 


 


 02/09/18 07:56  02/09/18 09:11  02/09/18 07:56  02/09/18 09:11  02/09/18 07:56











- Medications


Medications: 


 Current Medications





Atorvastatin Calcium (Lipitor)  20 mg PO HS [facility]


   Last Admin: 02/08/18 22:09 Dose:  20 mg


Divalproex Sodium (Depakote Dr(*Bid*))  500 mg PO BID [facility]


   Last Admin: 02/09/18 09:11 Dose:  500 mg


Enalapril Maleate (Vasotec)  10 mg PO DAILY [facility]


   Last Admin: 02/09/18 09:11 Dose:  10 mg


Fenofibrate (Tricor)  145 mg PO DAILY [facility]


   Last Admin: 02/09/18 09:11 Dose:  145 mg


Haloperidol (Haldol)  1 mg PO Q8 PRN


   PRN Reason: Agitation


   Last Admin: 02/01/18 10:35 Dose:  1 mg


Haloperidol Lactate (Haldol)  1 mg IM Q8 PRN


   PRN Reason: Agitation


Metoprolol Tartrate (Lopressor)  50 mg PO Q12 [facility]


   Last Admin: 02/09/18 09:11 Dose:  50 mg


Quetiapine Fumarate (Seroquel)  50 mg PO Q12 [facility]


   Last Admin: 02/09/18 09:11 Dose:  50 mg











- Labs


Labs: 


 





 12/19/17 05:45 





 12/19/17 05:45 





 











PT  11.2 SECONDS (9.4-12.0)   12/14/15  05:20    


 


INR  1.05  (0.89-1.20)   12/14/15  05:20    


 


APTT  36.2 SECONDS (23.1-32.0)  H  12/14/15  05:20    














- Constitutional


Appears: Well, Non-toxic, No Acute Distress





- Head Exam


Head Exam: ATRAUMATIC, NORMAL INSPECTION, NORMOCEPHALIC





- Eye Exam


Eye Exam: EOMI, Normal appearance, PERRL


Pupil Exam: NORMAL ACCOMODATION, PERRL





- ENT Exam


ENT Exam: Mucous Membranes Moist, Normal Exam





- Neck Exam


Neck Exam: Full ROM, Normal Inspection.  absent: Lymphadenopathy





- Respiratory Exam


Respiratory Exam: Clear to Ausculation Bilateral, NORMAL BREATHING PATTERN





- Cardiovascular Exam


Cardiovascular Exam: REGULAR RHYTHM, RRR, +S1, +S2.  absent: Murmur





- GI/Abdominal Exam


GI & Abdominal Exam: Soft, Normal Bowel Sounds.  absent: Tenderness





- Extremities Exam


Extremities Exam: Full ROM, Normal Capillary Refill, Normal Inspection.  absent

: Joint Swelling, Pedal Edema





- Back Exam


Back Exam: NORMAL INSPECTION





- Neurological Exam


Neurological Exam: Alert, Awake, CN II-XII Intact, Normal Gait, Oriented x3





- Psychiatric Exam


Psychiatric exam: Normal Affect, Normal Mood





- Skin


Skin Exam: Dry, Intact, Normal Color, Warm





Assessment and Plan


(1) DVT prophylaxis


Status: Chronic   





(2) Scrotal edema


Status: Chronic   





(3) CAD (coronary artery disease)


Status: Chronic   





(4) Smoking


Status: Chronic   





(5) Low back pain


Status: Chronic   





(6) Prediabetes


Status: Chronic   





(7) Neurocognitive disorder


Status: Chronic

## 2018-02-10 RX ADMIN — DIVALPROEX SODIUM SCH MG: 250 TABLET, DELAYED RELEASE ORAL at 09:19

## 2018-02-10 RX ADMIN — DIVALPROEX SODIUM SCH MG: 250 TABLET, DELAYED RELEASE ORAL at 16:57

## 2018-02-10 NOTE — CP.PCM.PN
Subjective





- Date & Time of Evaluation


Date of Evaluation: 02/10/18


Time of Evaluation: 09:28





- Subjective


Subjective: 





s/o no f/c, n/v/d. no other complaints.calm and cooperative. no abd pain, 


a/p cont plan, cont placement, cont all medical management, cont psych meds


remains unable to make medical decisians for self. pending state disability/

guardianship/placement





Objective





- Vital Signs/Intake and Output


Vital Signs (last 24 hours): 


 











Temp Pulse Resp BP Pulse Ox


 


 97.1 F L  65   20   98/63 L  95 


 


 02/10/18 08:28  02/10/18 08:28  02/10/18 08:28  02/10/18 08:28  02/10/18 08:28











- Medications


Medications: 


 Current Medications





Atorvastatin Calcium (Lipitor)  20 mg PO HS [facility]


   Last Admin: 02/09/18 21:24 Dose:  20 mg


Divalproex Sodium (Depakote Dr(*Bid*))  500 mg PO BID [facility]


   Last Admin: 02/10/18 09:19 Dose:  500 mg


Enalapril Maleate (Vasotec)  10 mg PO DAILY [facility]


   Last Admin: 02/10/18 09:20 Dose:  Not Given


Fenofibrate (Tricor)  145 mg PO DAILY [facility]


   Last Admin: 02/10/18 09:20 Dose:  145 mg


Haloperidol (Haldol)  1 mg PO Q8 PRN


   PRN Reason: Agitation


   Last Admin: 02/01/18 10:35 Dose:  1 mg


Haloperidol Lactate (Haldol)  1 mg IM Q8 PRN


   PRN Reason: Agitation


Metoprolol Tartrate (Lopressor)  50 mg PO Q12 [facility]


   Last Admin: 02/10/18 09:20 Dose:  Not Given


Quetiapine Fumarate (Seroquel)  50 mg PO Q12 [facility]


   Last Admin: 02/10/18 09:20 Dose:  50 mg











- Labs


Labs: 


 





 12/19/17 05:45 





 12/19/17 05:45 





 











PT  11.2 SECONDS (9.4-12.0)   12/14/15  05:20    


 


INR  1.05  (0.89-1.20)   12/14/15  05:20    


 


APTT  36.2 SECONDS (23.1-32.0)  H  12/14/15  05:20    














- Constitutional


Appears: Well, Non-toxic, No Acute Distress





- Head Exam


Head Exam: ATRAUMATIC, NORMAL INSPECTION, NORMOCEPHALIC





- Eye Exam


Eye Exam: EOMI, Normal appearance, PERRL


Pupil Exam: NORMAL ACCOMODATION, PERRL





- ENT Exam


ENT Exam: Mucous Membranes Moist, Normal Exam





- Neck Exam


Neck Exam: Full ROM, Normal Inspection.  absent: Lymphadenopathy





- Respiratory Exam


Respiratory Exam: Clear to Ausculation Bilateral, NORMAL BREATHING PATTERN





- Cardiovascular Exam


Cardiovascular Exam: REGULAR RHYTHM, RRR, +S1, +S2.  absent: Murmur





- GI/Abdominal Exam


GI & Abdominal Exam: Soft, Normal Bowel Sounds.  absent: Tenderness





- Extremities Exam


Extremities Exam: Full ROM, Normal Capillary Refill, Normal Inspection.  absent

: Joint Swelling, Pedal Edema





- Back Exam


Back Exam: NORMAL INSPECTION





- Neurological Exam


Neurological Exam: Alert, Awake, CN II-XII Intact, Normal Gait, Oriented x3





- Psychiatric Exam


Psychiatric exam: Normal Affect, Normal Mood





- Skin


Skin Exam: Dry, Intact, Normal Color, Warm





Assessment and Plan


(1) DVT prophylaxis


Status: Chronic   





(2) Scrotal edema


Status: Chronic   





(3) CAD (coronary artery disease)


Status: Chronic   





(4) Smoking


Status: Chronic   





(5) Low back pain


Status: Chronic   





(6) Prediabetes


Status: Chronic   





(7) Neurocognitive disorder


Status: Chronic

## 2018-02-11 RX ADMIN — DIVALPROEX SODIUM SCH MG: 250 TABLET, DELAYED RELEASE ORAL at 16:52

## 2018-02-11 RX ADMIN — DIVALPROEX SODIUM SCH MG: 250 TABLET, DELAYED RELEASE ORAL at 09:00

## 2018-02-11 NOTE — CP.PCM.PN
Subjective





- Date & Time of Evaluation


Date of Evaluation: 02/11/18


Time of Evaluation: 09:29





- Subjective


Subjective: 





s/o no f/c, n/v/d. no other complaints.calm and cooperative. no abd pain, 


a/p cont plan, cont placement, cont all medical management, cont psych meds


remains unable to make medical decisians for self. pending state disability/

guardianship/placement





Objective





- Vital Signs/Intake and Output


Vital Signs (last 24 hours): 


 











Temp Pulse Resp BP Pulse Ox


 


 98.3 F   78   20   104/74   96 


 


 02/11/18 07:59  02/11/18 09:00  02/11/18 07:59  02/11/18 09:00  02/11/18 07:59











- Medications


Medications: 


 Current Medications





Atorvastatin Calcium (Lipitor)  20 mg PO HS [facility]


   Last Admin: 02/10/18 21:05 Dose:  20 mg


Divalproex Sodium (Depakote Dr(*Bid*))  500 mg PO BID [facility]


   Last Admin: 02/11/18 09:00 Dose:  500 mg


Enalapril Maleate (Vasotec)  10 mg PO DAILY [facility]


   Last Admin: 02/11/18 09:00 Dose:  10 mg


Fenofibrate (Tricor)  145 mg PO DAILY [facility]


   Last Admin: 02/11/18 09:00 Dose:  145 mg


Haloperidol (Haldol)  1 mg PO Q8 PRN


   PRN Reason: Agitation


   Last Admin: 02/01/18 10:35 Dose:  1 mg


Haloperidol Lactate (Haldol)  1 mg IM Q8 PRN


   PRN Reason: Agitation


Metoprolol Tartrate (Lopressor)  50 mg PO Q12 [facility]


   Last Admin: 02/11/18 09:00 Dose:  50 mg


Quetiapine Fumarate (Seroquel)  50 mg PO Q12 [facility]


   Last Admin: 02/11/18 09:00 Dose:  50 mg











- Labs


Labs: 


 





 12/19/17 05:45 





 12/19/17 05:45 





 











PT  11.2 SECONDS (9.4-12.0)   12/14/15  05:20    


 


INR  1.05  (0.89-1.20)   12/14/15  05:20    


 


APTT  36.2 SECONDS (23.1-32.0)  H  12/14/15  05:20    














- Constitutional


Appears: Well, Non-toxic, No Acute Distress





- Head Exam


Head Exam: ATRAUMATIC, NORMAL INSPECTION, NORMOCEPHALIC





- Eye Exam


Eye Exam: EOMI, Normal appearance, PERRL


Pupil Exam: NORMAL ACCOMODATION, PERRL





- ENT Exam


ENT Exam: Mucous Membranes Moist, Normal Exam





- Neck Exam


Neck Exam: Full ROM, Normal Inspection.  absent: Lymphadenopathy





- Respiratory Exam


Respiratory Exam: Clear to Ausculation Bilateral, NORMAL BREATHING PATTERN





- Cardiovascular Exam


Cardiovascular Exam: REGULAR RHYTHM, +S1, +S2.  absent: Murmur





- GI/Abdominal Exam


GI & Abdominal Exam: Soft, Normal Bowel Sounds.  absent: Tenderness





- Extremities Exam


Extremities Exam: Full ROM, Normal Capillary Refill, Normal Inspection.  absent

: Joint Swelling, Pedal Edema





- Back Exam


Back Exam: NORMAL INSPECTION





- Neurological Exam


Neurological Exam: Alert, Awake, CN II-XII Intact, Normal Gait, Oriented x3





- Psychiatric Exam


Psychiatric exam: Normal Affect, Normal Mood





- Skin


Skin Exam: Dry, Intact, Normal Color, Warm





Assessment and Plan


(1) DVT prophylaxis


Status: Chronic   





(2) Scrotal edema


Status: Chronic   





(3) CAD (coronary artery disease)


Status: Chronic   





(4) Smoking


Status: Chronic   





(5) Low back pain


Status: Chronic   





(6) Prediabetes


Status: Chronic   





(7) Neurocognitive disorder


Status: Chronic

## 2018-02-12 RX ADMIN — DIVALPROEX SODIUM SCH MG: 250 TABLET, DELAYED RELEASE ORAL at 08:56

## 2018-02-12 RX ADMIN — DIVALPROEX SODIUM SCH MG: 250 TABLET, DELAYED RELEASE ORAL at 15:59

## 2018-02-12 NOTE — CP.PCM.PN
Subjective





- Date & Time of Evaluation


Date of Evaluation: 02/12/18


Time of Evaluation: 10:02





- Subjective


Subjective: 





s/o no f/c, n/v/d. no other complaints.calm and cooperative. no abd pain, 


a/p cont plan, cont placement, cont all medical management, cont psych meds


remains unable to make medical decisians for self. pending state disability/

guardianship/placement





Objective





- Vital Signs/Intake and Output


Vital Signs (last 24 hours): 


 











Temp Pulse Resp BP Pulse Ox


 


 97.8 F   65   19   97/68 L  95 


 


 02/12/18 08:50  02/12/18 08:56  02/12/18 08:50  02/12/18 08:50  02/12/18 08:50











- Medications


Medications: 


 Current Medications





Atorvastatin Calcium (Lipitor)  20 mg PO HS [facility]


   Last Admin: 02/11/18 21:02 Dose:  20 mg


Divalproex Sodium (Depakote Dr(*Bid*))  500 mg PO BID [facility]


   Last Admin: 02/12/18 08:56 Dose:  500 mg


Enalapril Maleate (Vasotec)  10 mg PO DAILY [facility]


   Last Admin: 02/12/18 08:56 Dose:  10 mg


Fenofibrate (Tricor)  145 mg PO DAILY [facility]


   Last Admin: 02/12/18 08:57 Dose:  145 mg


Metoprolol Tartrate (Lopressor)  50 mg PO Q12 [facility]


   Last Admin: 02/12/18 08:56 Dose:  50 mg











- Labs


Labs: 


 





 12/19/17 05:45 





 12/19/17 05:45 





 











PT  11.2 SECONDS (9.4-12.0)   12/14/15  05:20    


 


INR  1.05  (0.89-1.20)   12/14/15  05:20    


 


APTT  36.2 SECONDS (23.1-32.0)  H  12/14/15  05:20    














- Constitutional


Appears: Well, Non-toxic, No Acute Distress





- Head Exam


Head Exam: ATRAUMATIC, NORMAL INSPECTION, NORMOCEPHALIC





- Eye Exam


Eye Exam: EOMI, Normal appearance, PERRL


Pupil Exam: NORMAL ACCOMODATION, PERRL





- ENT Exam


ENT Exam: Mucous Membranes Moist, Normal Exam





- Neck Exam


Neck Exam: Full ROM, Normal Inspection.  absent: Lymphadenopathy





- Respiratory Exam


Respiratory Exam: Clear to Ausculation Bilateral, NORMAL BREATHING PATTERN





- Cardiovascular Exam


Cardiovascular Exam: REGULAR RHYTHM, RRR, +S1, +S2.  absent: Murmur





- GI/Abdominal Exam


GI & Abdominal Exam: Soft, Normal Bowel Sounds.  absent: Tenderness





- Extremities Exam


Extremities Exam: Full ROM, Normal Capillary Refill, Normal Inspection.  absent

: Joint Swelling, Pedal Edema





- Back Exam


Back Exam: NORMAL INSPECTION





- Neurological Exam


Neurological Exam: Alert, Awake, CN II-XII Intact, Normal Gait, Oriented x3





- Psychiatric Exam


Psychiatric exam: Normal Affect, Normal Mood





- Skin


Skin Exam: Dry, Intact, Normal Color, Warm





Assessment and Plan


(1) DVT prophylaxis


Status: Chronic   





(2) Scrotal edema


Status: Chronic   





(3) CAD (coronary artery disease)


Status: Chronic   





(4) Smoking


Status: Chronic   





(5) Low back pain


Status: Chronic   





(6) Prediabetes


Status: Chronic   





(7) Neurocognitive disorder


Status: Chronic

## 2018-02-13 RX ADMIN — DIVALPROEX SODIUM SCH MG: 250 TABLET, DELAYED RELEASE ORAL at 08:57

## 2018-02-13 RX ADMIN — DIVALPROEX SODIUM SCH MG: 250 TABLET, DELAYED RELEASE ORAL at 16:32

## 2018-02-13 NOTE — CP.PCM.PN
Subjective





- Date & Time of Evaluation


Date of Evaluation: 02/13/18


Time of Evaluation: 08:02





- Subjective


Subjective: 





s/o no f/c, n/v/d. no other complaints.calm and cooperative. no abd pain, 


a/p cont plan, cont placement, cont all medical management, cont psych meds


remains unable to make medical decisians for self. pending state disability/

guardianship/placement





Objective





- Vital Signs/Intake and Output


Vital Signs (last 24 hours): 


 











Temp Pulse Resp BP Pulse Ox


 


 98.7 F   69   19   95/67 L  96 


 


 02/13/18 00:00  02/13/18 00:00  02/13/18 00:00  02/13/18 00:00  02/13/18 00:00











- Medications


Medications: 


 Current Medications





Atorvastatin Calcium (Lipitor)  20 mg PO HS [facility]


   Last Admin: 02/12/18 21:51 Dose:  20 mg


Divalproex Sodium (Depakote Dr(*Bid*))  500 mg PO BID [facility]


   Last Admin: 02/12/18 15:59 Dose:  500 mg


Enalapril Maleate (Vasotec)  10 mg PO DAILY [facility]


   Last Admin: 02/12/18 08:56 Dose:  10 mg


Fenofibrate (Tricor)  145 mg PO DAILY [facility]


   Last Admin: 02/12/18 08:57 Dose:  145 mg


Metoprolol Tartrate (Lopressor)  50 mg PO Q12 [facility]


   Last Admin: 02/12/18 21:52 Dose:  50 mg


Quetiapine Fumarate (Seroquel)  50 mg PO Q12 [facility]


   Last Admin: 02/12/18 23:39 Dose:  50 mg











- Labs


Labs: 


 





 12/19/17 05:45 





 12/19/17 05:45 





 











PT  11.2 SECONDS (9.4-12.0)   12/14/15  05:20    


 


INR  1.05  (0.89-1.20)   12/14/15  05:20    


 


APTT  36.2 SECONDS (23.1-32.0)  H  12/14/15  05:20    














- Constitutional


Appears: Well, Non-toxic, No Acute Distress





- Head Exam


Head Exam: ATRAUMATIC, NORMAL INSPECTION, NORMOCEPHALIC





- Eye Exam


Eye Exam: EOMI, Normal appearance, PERRL


Pupil Exam: NORMAL ACCOMODATION, PERRL





- ENT Exam


ENT Exam: Mucous Membranes Moist, Normal Exam





- Neck Exam


Neck Exam: Full ROM, Normal Inspection.  absent: Lymphadenopathy





- Respiratory Exam


Respiratory Exam: Clear to Ausculation Bilateral, NORMAL BREATHING PATTERN





- Cardiovascular Exam


Cardiovascular Exam: REGULAR RHYTHM, RRR, +S1, +S2.  absent: Murmur





- GI/Abdominal Exam


GI & Abdominal Exam: Soft, Normal Bowel Sounds.  absent: Tenderness





- Extremities Exam


Extremities Exam: Full ROM, Normal Capillary Refill, Normal Inspection.  absent

: Joint Swelling, Pedal Edema





- Back Exam


Back Exam: NORMAL INSPECTION





- Neurological Exam


Neurological Exam: Alert, Awake, CN II-XII Intact, Normal Gait, Oriented x3





- Psychiatric Exam


Psychiatric exam: Normal Affect, Normal Mood





- Skin


Skin Exam: Dry, Intact, Normal Color, Warm





Assessment and Plan


(1) DVT prophylaxis


Status: Chronic   





(2) Scrotal edema


Status: Chronic   





(3) CAD (coronary artery disease)


Status: Chronic   





(4) Smoking


Status: Chronic   





(5) Low back pain


Status: Chronic   





(6) Prediabetes


Status: Chronic   





(7) Neurocognitive disorder


Status: Chronic

## 2018-02-14 RX ADMIN — DIVALPROEX SODIUM SCH MG: 250 TABLET, DELAYED RELEASE ORAL at 17:04

## 2018-02-14 RX ADMIN — DIVALPROEX SODIUM SCH MG: 250 TABLET, DELAYED RELEASE ORAL at 08:44

## 2018-02-14 NOTE — CP.PCM.PN
Subjective





- Date & Time of Evaluation


Date of Evaluation: 02/14/18


Time of Evaluation: 07:13





- Subjective


Subjective: 





s/o no f/c, n/v/d. no other complaints.calm and cooperative. no abd pain, 


a/p cont plan, cont placement, cont all medical management, cont psych meds


remains unable to make medical decisians for self. pending state disability/

guardianship/placement





Objective





- Vital Signs/Intake and Output


Vital Signs (last 24 hours): 


 











Temp Pulse Resp BP Pulse Ox


 


 98.0 F   80   20   98/66 L  95 


 


 02/14/18 00:05  02/14/18 00:05  02/14/18 00:05  02/14/18 00:05  02/14/18 00:05











- Medications


Medications: 


 Current Medications





Atorvastatin Calcium (Lipitor)  20 mg PO HS [facility]


   Last Admin: 02/13/18 21:00 Dose:  20 mg


Divalproex Sodium (Depakote Dr(*Bid*))  500 mg PO BID [facility]


   Last Admin: 02/13/18 16:32 Dose:  500 mg


Enalapril Maleate (Vasotec)  10 mg PO DAILY [facility]


   Last Admin: 02/13/18 08:57 Dose:  10 mg


Fenofibrate (Tricor)  145 mg PO DAILY [facility]


   Last Admin: 02/13/18 08:57 Dose:  145 mg


Metoprolol Tartrate (Lopressor)  50 mg PO Q12 [facility]


   Last Admin: 02/13/18 21:00 Dose:  50 mg


Quetiapine Fumarate (Seroquel)  50 mg PO Q12 [facility]


   Last Admin: 02/13/18 21:00 Dose:  50 mg











- Labs


Labs: 


 





 12/19/17 05:45 





 12/19/17 05:45 





 











PT  11.2 SECONDS (9.4-12.0)   12/14/15  05:20    


 


INR  1.05  (0.89-1.20)   12/14/15  05:20    


 


APTT  36.2 SECONDS (23.1-32.0)  H  12/14/15  05:20    














- Constitutional


Appears: Well, Non-toxic, No Acute Distress





- Head Exam


Head Exam: ATRAUMATIC, NORMAL INSPECTION, NORMOCEPHALIC





- Eye Exam


Eye Exam: EOMI, Normal appearance, PERRL


Pupil Exam: NORMAL ACCOMODATION, PERRL





- ENT Exam


ENT Exam: Mucous Membranes Moist, Normal Exam





- Neck Exam


Neck Exam: Full ROM, Normal Inspection.  absent: Lymphadenopathy





- Respiratory Exam


Respiratory Exam: Clear to Ausculation Bilateral, NORMAL BREATHING PATTERN





- Cardiovascular Exam


Cardiovascular Exam: REGULAR RHYTHM, RRR, +S1, +S2.  absent: Murmur





- GI/Abdominal Exam


GI & Abdominal Exam: Soft, Normal Bowel Sounds.  absent: Tenderness





- Extremities Exam


Extremities Exam: Full ROM, Normal Capillary Refill, Normal Inspection.  absent

: Joint Swelling, Pedal Edema





- Back Exam


Back Exam: NORMAL INSPECTION





- Neurological Exam


Neurological Exam: Alert, Awake, CN II-XII Intact, Normal Gait, Oriented x3





- Psychiatric Exam


Psychiatric exam: Normal Affect, Normal Mood





- Skin


Skin Exam: Dry, Intact, Normal Color, Warm





Assessment and Plan


(1) DVT prophylaxis


Status: Chronic   





(2) Scrotal edema


Status: Chronic   





(3) CAD (coronary artery disease)


Status: Chronic   





(4) Smoking


Status: Chronic   





(5) Low back pain


Status: Chronic   





(6) Prediabetes


Status: Chronic   





(7) Neurocognitive disorder


Status: Chronic

## 2018-02-15 RX ADMIN — DIVALPROEX SODIUM SCH MG: 250 TABLET, DELAYED RELEASE ORAL at 17:23

## 2018-02-15 RX ADMIN — DIVALPROEX SODIUM SCH MG: 250 TABLET, DELAYED RELEASE ORAL at 09:17

## 2018-02-15 NOTE — CP.PCM.PN
Subjective





- Date & Time of Evaluation


Date of Evaluation: 02/15/18


Time of Evaluation: 08:19





- Subjective


Subjective: 





s/o no f/c, n/v/d. no other complaints.calm and cooperative. no abd pain, 


a/p cont plan, cont placement, cont all medical management, cont psych meds


remains unable to make medical decisians for self. pending state disability/

guardianship/placement








Objective





- Vital Signs/Intake and Output


Vital Signs (last 24 hours): 


 











Temp Pulse Resp BP Pulse Ox


 


 97.7 F   68   18   98/66 L  96 


 


 02/15/18 07:48  02/15/18 07:48  02/15/18 07:48  02/15/18 07:48  02/15/18 07:48











- Medications


Medications: 


 Current Medications





Atorvastatin Calcium (Lipitor)  20 mg PO HS [facility]


   Last Admin: 02/14/18 21:32 Dose:  20 mg


Divalproex Sodium (Depakote Dr(*Bid*))  500 mg PO BID [facility]


   Last Admin: 02/14/18 17:04 Dose:  500 mg


Enalapril Maleate (Vasotec)  10 mg PO DAILY [facility]


   Last Admin: 02/14/18 08:44 Dose:  10 mg


Fenofibrate (Tricor)  145 mg PO DAILY [facility]


   Last Admin: 02/14/18 08:42 Dose:  145 mg


Metoprolol Tartrate (Lopressor)  50 mg PO Q12 [facility]


   Last Admin: 02/14/18 21:32 Dose:  50 mg


Quetiapine Fumarate (Seroquel)  50 mg PO Q12 [facility]


   Last Admin: 02/14/18 21:32 Dose:  50 mg











- Labs


Labs: 


 





 12/19/17 05:45 





 12/19/17 05:45 





 











PT  11.2 SECONDS (9.4-12.0)   12/14/15  05:20    


 


INR  1.05  (0.89-1.20)   12/14/15  05:20    


 


APTT  36.2 SECONDS (23.1-32.0)  H  12/14/15  05:20    














- Constitutional


Appears: Well, Non-toxic, No Acute Distress





- Head Exam


Head Exam: ATRAUMATIC, NORMAL INSPECTION, NORMOCEPHALIC





- Eye Exam


Eye Exam: EOMI, Normal appearance, PERRL


Pupil Exam: NORMAL ACCOMODATION, PERRL





- ENT Exam


ENT Exam: Mucous Membranes Moist, Normal Exam





- Neck Exam


Neck Exam: Full ROM, Normal Inspection.  absent: Lymphadenopathy





- Respiratory Exam


Respiratory Exam: Clear to Ausculation Bilateral, NORMAL BREATHING PATTERN





- Cardiovascular Exam


Cardiovascular Exam: REGULAR RHYTHM, RRR, +S1, +S2.  absent: Murmur





- GI/Abdominal Exam


GI & Abdominal Exam: Soft, Normal Bowel Sounds.  absent: Tenderness





- Extremities Exam


Extremities Exam: Full ROM, Normal Capillary Refill, Normal Inspection.  absent

: Joint Swelling, Pedal Edema





- Back Exam


Back Exam: NORMAL INSPECTION





- Neurological Exam


Neurological Exam: Alert, Awake, CN II-XII Intact, Normal Gait, Oriented x3





- Psychiatric Exam


Psychiatric exam: Normal Affect, Normal Mood





- Skin


Skin Exam: Dry, Intact, Normal Color, Warm





Assessment and Plan


(1) DVT prophylaxis


Status: Chronic   





(2) Scrotal edema


Status: Chronic   





(3) CAD (coronary artery disease)


Status: Chronic   





(4) Smoking


Status: Chronic   





(5) Low back pain


Status: Chronic   





(6) Prediabetes


Status: Chronic   





(7) Neurocognitive disorder


Status: Chronic

## 2018-02-16 RX ADMIN — DIVALPROEX SODIUM SCH MG: 250 TABLET, DELAYED RELEASE ORAL at 09:24

## 2018-02-16 RX ADMIN — DIVALPROEX SODIUM SCH MG: 250 TABLET, DELAYED RELEASE ORAL at 17:42

## 2018-02-17 RX ADMIN — DIVALPROEX SODIUM SCH MG: 250 TABLET, DELAYED RELEASE ORAL at 09:16

## 2018-02-17 RX ADMIN — DIVALPROEX SODIUM SCH MG: 250 TABLET, DELAYED RELEASE ORAL at 17:34

## 2018-02-17 NOTE — CP.PCM.PN
Subjective





- Date & Time of Evaluation


Date of Evaluation: 02/17/18


Time of Evaluation: 07:57





- Subjective


Subjective: 





s/o no f/c, n/v/d. no other complaints.calm and cooperative. no abd pain, 


a/p cont plan, cont placement, cont all medical management, cont psych meds


remains unable to make medical decisians for self. pending state disability/

guardianship/placement





Objective





- Vital Signs/Intake and Output


Vital Signs (last 24 hours): 


 











Temp Pulse Resp BP Pulse Ox


 


 98 F   62   20   98/66 L  98 


 


 02/16/18 23:50  02/16/18 23:50  02/16/18 23:50  02/16/18 23:50  02/16/18 23:50











- Medications


Medications: 


 Current Medications





Atorvastatin Calcium (Lipitor)  20 mg PO HS [facility]


   Last Admin: 02/16/18 21:50 Dose:  20 mg


Divalproex Sodium (Depakote Dr(*Bid*))  500 mg PO BID [facility]


   Last Admin: 02/16/18 17:42 Dose:  500 mg


Enalapril Maleate (Vasotec)  10 mg PO DAILY [facility]


   Last Admin: 02/16/18 09:27 Dose:  10 mg


Fenofibrate (Tricor)  145 mg PO DAILY [facility]


   Last Admin: 02/16/18 09:27 Dose:  145 mg


Metoprolol Tartrate (Lopressor)  50 mg PO Q12 [facility]


   Last Admin: 02/16/18 21:50 Dose:  50 mg


Quetiapine Fumarate (Seroquel)  50 mg PO Q12 [facility]


   Last Admin: 02/16/18 21:50 Dose:  50 mg











- Labs


Labs: 


 





 12/19/17 05:45 





 12/19/17 05:45 





 











PT  11.2 SECONDS (9.4-12.0)   12/14/15  05:20    


 


INR  1.05  (0.89-1.20)   12/14/15  05:20    


 


APTT  36.2 SECONDS (23.1-32.0)  H  12/14/15  05:20    














- Constitutional


Appears: Well, Non-toxic, No Acute Distress





- Head Exam


Head Exam: ATRAUMATIC, NORMAL INSPECTION, NORMOCEPHALIC





- Eye Exam


Eye Exam: EOMI, Normal appearance, PERRL


Pupil Exam: NORMAL ACCOMODATION, PERRL





- ENT Exam


ENT Exam: Mucous Membranes Moist, Normal Exam





- Neck Exam


Neck Exam: Full ROM, Normal Inspection.  absent: Lymphadenopathy





- Respiratory Exam


Respiratory Exam: Clear to Ausculation Bilateral, NORMAL BREATHING PATTERN





- Cardiovascular Exam


Cardiovascular Exam: REGULAR RHYTHM, RRR, +S1, +S2.  absent: Murmur





- GI/Abdominal Exam


GI & Abdominal Exam: Soft, Normal Bowel Sounds.  absent: Tenderness





- Extremities Exam


Extremities Exam: Full ROM, Normal Capillary Refill, Normal Inspection.  absent

: Joint Swelling, Pedal Edema





- Back Exam


Back Exam: NORMAL INSPECTION





- Neurological Exam


Neurological Exam: Alert, Awake, CN II-XII Intact, Normal Gait, Oriented x3





- Psychiatric Exam


Psychiatric exam: Normal Affect, Normal Mood





- Skin


Skin Exam: Dry, Intact, Normal Color, Warm





Assessment and Plan


(1) DVT prophylaxis


Status: Chronic   





(2) Scrotal edema


Status: Chronic   





(3) CAD (coronary artery disease)


Status: Chronic   





(4) Smoking


Status: Chronic   





(5) Low back pain


Status: Chronic   





(6) Prediabetes


Status: Chronic   





(7) Neurocognitive disorder


Status: Chronic

## 2018-02-18 RX ADMIN — DIVALPROEX SODIUM SCH MG: 250 TABLET, DELAYED RELEASE ORAL at 10:27

## 2018-02-18 RX ADMIN — DIVALPROEX SODIUM SCH MG: 250 TABLET, DELAYED RELEASE ORAL at 17:05

## 2018-02-18 NOTE — CP.PCM.PN
Subjective





- Date & Time of Evaluation


Date of Evaluation: 02/18/18


Time of Evaluation: 07:46





- Subjective


Subjective: 





s/o no f/c, n/v/d. no other complaints.calm and cooperative. no abd pain, 


a/p cont plan, cont placement, cont all medical management, cont psych meds


remains unable to make medical decisians for self. pending state disability/

guardianship/placement





Objective





- Vital Signs/Intake and Output


Vital Signs (last 24 hours): 


 











Temp Pulse Resp BP Pulse Ox


 


 98.3 F   81   17   108/69   95 


 


 02/17/18 21:21  02/17/18 21:21  02/17/18 21:21  02/17/18 21:21  02/17/18 21:21











- Medications


Medications: 


 Current Medications





Atorvastatin Calcium (Lipitor)  20 mg PO HS [facility]


   Last Admin: 02/17/18 21:21 Dose:  20 mg


Divalproex Sodium (Depakote Dr(*Bid*))  500 mg PO BID [facility]


   Last Admin: 02/17/18 17:34 Dose:  500 mg


Enalapril Maleate (Vasotec)  10 mg PO DAILY [facility]


   Last Admin: 02/17/18 09:16 Dose:  10 mg


Fenofibrate (Tricor)  145 mg PO DAILY [facility]


   Last Admin: 02/17/18 09:16 Dose:  145 mg


Metoprolol Tartrate (Lopressor)  50 mg PO Q12 [facility]


   Last Admin: 02/17/18 21:21 Dose:  50 mg


Quetiapine Fumarate (Seroquel)  50 mg PO Q12 [facility]


   Last Admin: 02/17/18 21:21 Dose:  50 mg











- Labs


Labs: 


 





 12/19/17 05:45 





 12/19/17 05:45 





 











PT  11.2 SECONDS (9.4-12.0)   12/14/15  05:20    


 


INR  1.05  (0.89-1.20)   12/14/15  05:20    


 


APTT  36.2 SECONDS (23.1-32.0)  H  12/14/15  05:20    














- Constitutional


Appears: Well, Non-toxic, No Acute Distress





- Head Exam


Head Exam: ATRAUMATIC, NORMAL INSPECTION, NORMOCEPHALIC





- Eye Exam


Eye Exam: EOMI, Normal appearance, PERRL


Pupil Exam: NORMAL ACCOMODATION, PERRL





- ENT Exam


ENT Exam: Mucous Membranes Moist, Normal Exam





- Neck Exam


Neck Exam: Full ROM, Normal Inspection.  absent: Lymphadenopathy





- Respiratory Exam


Respiratory Exam: Clear to Ausculation Bilateral, NORMAL BREATHING PATTERN





- Cardiovascular Exam


Cardiovascular Exam: REGULAR RHYTHM, RRR, +S1, +S2.  absent: Murmur





- GI/Abdominal Exam


GI & Abdominal Exam: Soft, Normal Bowel Sounds.  absent: Tenderness





- Extremities Exam


Extremities Exam: Full ROM, Normal Capillary Refill, Normal Inspection.  absent

: Joint Swelling, Pedal Edema





- Back Exam


Back Exam: NORMAL INSPECTION





- Neurological Exam


Neurological Exam: Alert, Awake, CN II-XII Intact, Normal Gait, Oriented x3





- Psychiatric Exam


Psychiatric exam: Normal Affect, Normal Mood





- Skin


Skin Exam: Dry, Intact, Normal Color, Warm





Assessment and Plan


(1) DVT prophylaxis


Status: Chronic   





(2) Scrotal edema


Status: Chronic   





(3) CAD (coronary artery disease)


Status: Chronic   





(4) Smoking


Status: Chronic   





(5) Low back pain


Status: Chronic   





(6) Prediabetes


Status: Chronic   





(7) Neurocognitive disorder


Status: Chronic

## 2018-02-19 RX ADMIN — DIVALPROEX SODIUM SCH MG: 250 TABLET, DELAYED RELEASE ORAL at 17:29

## 2018-02-19 RX ADMIN — DIVALPROEX SODIUM SCH MG: 250 TABLET, DELAYED RELEASE ORAL at 10:26

## 2018-02-19 NOTE — CP.PCM.PN
Subjective





- Date & Time of Evaluation


Date of Evaluation: 02/19/18


Time of Evaluation: 08:26





- Subjective


Subjective: 





s/o no f/c, n/v/d. no other complaints.calm and cooperative. no abd pain, 


a/p cont plan, cont placement, cont all medical management, cont psych meds


remains unable to make medical decisians for self. pending state disability/

guardianship/placement





Objective





- Vital Signs/Intake and Output


Vital Signs (last 24 hours): 


 











Temp Pulse Resp BP Pulse Ox


 


 98.0 F   71   19   96/63 L  96 


 


 02/19/18 00:00  02/19/18 00:00  02/19/18 00:00  02/19/18 00:00  02/19/18 00:00











- Medications


Medications: 


 Current Medications





Atorvastatin Calcium (Lipitor)  20 mg PO HS [facility]


   Last Admin: 02/18/18 21:48 Dose:  20 mg


Divalproex Sodium (Depakote Dr(*Bid*))  500 mg PO BID [facility]


   Last Admin: 02/18/18 17:05 Dose:  500 mg


Enalapril Maleate (Vasotec)  10 mg PO DAILY [facility]


   Last Admin: 02/18/18 10:27 Dose:  10 mg


Fenofibrate (Tricor)  145 mg PO DAILY [facility]


   Last Admin: 02/18/18 10:28 Dose:  145 mg


Metoprolol Tartrate (Lopressor)  50 mg PO Q12 [facility]


   Last Admin: 02/18/18 21:48 Dose:  50 mg


Quetiapine Fumarate (Seroquel)  50 mg PO Q12 [facility]


   Last Admin: 02/18/18 21:48 Dose:  50 mg











- Labs


Labs: 


 





 12/19/17 05:45 





 12/19/17 05:45 





 











PT  11.2 SECONDS (9.4-12.0)   12/14/15  05:20    


 


INR  1.05  (0.89-1.20)   12/14/15  05:20    


 


APTT  36.2 SECONDS (23.1-32.0)  H  12/14/15  05:20    














- Constitutional


Appears: Well, Non-toxic, No Acute Distress





- Head Exam


Head Exam: ATRAUMATIC, NORMAL INSPECTION, NORMOCEPHALIC





- Eye Exam


Eye Exam: EOMI, Normal appearance, PERRL


Pupil Exam: NORMAL ACCOMODATION, PERRL





- ENT Exam


ENT Exam: Mucous Membranes Moist, Normal Exam





- Neck Exam


Neck Exam: Full ROM, Normal Inspection.  absent: Lymphadenopathy





- Respiratory Exam


Respiratory Exam: Clear to Ausculation Bilateral, NORMAL BREATHING PATTERN





- Cardiovascular Exam


Cardiovascular Exam: REGULAR RHYTHM, RRR, +S1, +S2.  absent: Murmur





- GI/Abdominal Exam


GI & Abdominal Exam: Soft, Normal Bowel Sounds.  absent: Tenderness





- Extremities Exam


Extremities Exam: Full ROM, Normal Capillary Refill, Normal Inspection.  absent

: Joint Swelling, Pedal Edema





- Back Exam


Back Exam: NORMAL INSPECTION





- Neurological Exam


Neurological Exam: Alert, Awake, CN II-XII Intact, Normal Gait, Oriented x3





- Psychiatric Exam


Psychiatric exam: Normal Affect, Normal Mood





- Skin


Skin Exam: Dry, Intact, Normal Color, Warm





Assessment and Plan


(1) DVT prophylaxis


Status: Chronic   





(2) Scrotal edema


Status: Chronic   





(3) CAD (coronary artery disease)


Status: Chronic   





(4) Smoking


Status: Chronic   





(5) Low back pain


Status: Chronic   





(6) Prediabetes


Status: Chronic   





(7) Neurocognitive disorder


Status: Chronic

## 2018-02-20 RX ADMIN — DIVALPROEX SODIUM SCH MG: 250 TABLET, DELAYED RELEASE ORAL at 10:09

## 2018-02-20 RX ADMIN — DIVALPROEX SODIUM SCH MG: 250 TABLET, DELAYED RELEASE ORAL at 19:08

## 2018-02-20 NOTE — CP.PCM.PN
Subjective





- Date & Time of Evaluation


Date of Evaluation: 02/20/18


Time of Evaluation: 07:46





- Subjective


Subjective: 





s/o no f/c, n/v/d. no other complaints.calm and cooperative. no abd pain, 


a/p cont plan, cont placement, cont all medical management, cont psych meds


remains unable to make medical decisians for self. pending state disability/

guardianship/placement





Objective





- Vital Signs/Intake and Output


Vital Signs (last 24 hours): 


 











Temp Pulse Resp BP Pulse Ox


 


 97.7 F   68   18   95/65 L  96 


 


 02/20/18 00:26  02/20/18 00:26  02/20/18 00:26  02/20/18 00:26  02/20/18 00:26











- Medications


Medications: 


 Current Medications





Atorvastatin Calcium (Lipitor)  20 mg PO HS [facility]


   Last Admin: 02/19/18 21:22 Dose:  20 mg


Divalproex Sodium (Depakote Dr(*Bid*))  500 mg PO BID [facility]


   Last Admin: 02/19/18 17:29 Dose:  500 mg


Enalapril Maleate (Vasotec)  10 mg PO DAILY [facility]


   Last Admin: 02/19/18 10:27 Dose:  10 mg


Fenofibrate (Tricor)  145 mg PO DAILY [facility]


   Last Admin: 02/19/18 10:28 Dose:  145 mg


Metoprolol Tartrate (Lopressor)  50 mg PO Q12 [facility]


   Last Admin: 02/19/18 21:22 Dose:  50 mg


Quetiapine Fumarate (Seroquel)  50 mg PO Q12 [facility]


   Last Admin: 02/19/18 21:22 Dose:  50 mg











- Labs


Labs: 


 





 12/19/17 05:45 





 12/19/17 05:45 





 











PT  11.2 SECONDS (9.4-12.0)   12/14/15  05:20    


 


INR  1.05  (0.89-1.20)   12/14/15  05:20    


 


APTT  36.2 SECONDS (23.1-32.0)  H  12/14/15  05:20    














- Constitutional


Appears: Well, Non-toxic, No Acute Distress





- Head Exam


Head Exam: ATRAUMATIC, NORMAL INSPECTION, NORMOCEPHALIC





- Eye Exam


Eye Exam: EOMI, Normal appearance, PERRL


Pupil Exam: NORMAL ACCOMODATION, PERRL





- ENT Exam


ENT Exam: Mucous Membranes Moist, Normal Exam





- Neck Exam


Neck Exam: Full ROM, Normal Inspection.  absent: Lymphadenopathy





- Respiratory Exam


Respiratory Exam: Clear to Ausculation Bilateral, NORMAL BREATHING PATTERN





- Cardiovascular Exam


Cardiovascular Exam: REGULAR RHYTHM, RRR, +S1, +S2.  absent: Murmur





- GI/Abdominal Exam


GI & Abdominal Exam: Soft, Normal Bowel Sounds.  absent: Tenderness





- Extremities Exam


Extremities Exam: Full ROM, Normal Capillary Refill, Normal Inspection.  absent

: Joint Swelling, Pedal Edema





- Back Exam


Back Exam: NORMAL INSPECTION





- Neurological Exam


Neurological Exam: Alert, Awake, CN II-XII Intact, Normal Gait, Oriented x3





- Psychiatric Exam


Psychiatric exam: Normal Affect, Normal Mood





- Skin


Skin Exam: Dry, Intact, Normal Color, Warm





Assessment and Plan


(1) DVT prophylaxis


Status: Chronic   





(2) Scrotal edema


Status: Chronic   





(3) CAD (coronary artery disease)


Status: Chronic   





(4) Smoking


Status: Chronic   





(5) Low back pain


Status: Chronic   





(6) Prediabetes


Status: Chronic   





(7) Neurocognitive disorder


Status: Chronic

## 2018-02-21 RX ADMIN — DIVALPROEX SODIUM SCH MG: 250 TABLET, DELAYED RELEASE ORAL at 08:54

## 2018-02-21 RX ADMIN — DIVALPROEX SODIUM SCH MG: 250 TABLET, DELAYED RELEASE ORAL at 16:31

## 2018-02-21 NOTE — CP.PCM.PN
Subjective





- Date & Time of Evaluation


Date of Evaluation: 02/21/18


Time of Evaluation: 07:58





- Subjective


Subjective: 





s/o no f/c, n/v/d. no other complaints.calm and cooperative. no abd pain, 


a/p cont plan, cont placement, cont all medical management, cont psych meds


remains unable to make medical decisians for self. pending state disability/

guardianship/placement





Objective





- Vital Signs/Intake and Output


Vital Signs (last 24 hours): 


 











Temp Pulse Resp BP Pulse Ox


 


 97.7 F   75   19   105/72   97 


 


 02/21/18 00:26  02/21/18 00:26  02/21/18 00:26  02/21/18 00:26  02/21/18 00:26











- Medications


Medications: 


 Current Medications





Atorvastatin Calcium (Lipitor)  20 mg PO HS [facility]


   Last Admin: 02/20/18 21:17 Dose:  20 mg


Divalproex Sodium (Depakote Dr(*Bid*))  500 mg PO BID [facility]


   Last Admin: 02/20/18 19:08 Dose:  500 mg


Enalapril Maleate (Vasotec)  10 mg PO DAILY [facility]


   Last Admin: 02/20/18 10:09 Dose:  10 mg


Fenofibrate (Tricor)  145 mg PO DAILY [facility]


   Last Admin: 02/20/18 10:09 Dose:  145 mg


Metoprolol Tartrate (Lopressor)  50 mg PO Q12 [facility]


   Last Admin: 02/20/18 21:14 Dose:  50 mg


Quetiapine Fumarate (Seroquel)  50 mg PO Q12 [facility]


   Last Admin: 02/20/18 21:14 Dose:  50 mg











- Labs


Labs: 


 





 12/19/17 05:45 





 12/19/17 05:45 





 











PT  11.2 SECONDS (9.4-12.0)   12/14/15  05:20    


 


INR  1.05  (0.89-1.20)   12/14/15  05:20    


 


APTT  36.2 SECONDS (23.1-32.0)  H  12/14/15  05:20    














- Constitutional


Appears: Well, Non-toxic, No Acute Distress





- Head Exam


Head Exam: ATRAUMATIC, NORMAL INSPECTION, NORMOCEPHALIC





- Eye Exam


Eye Exam: EOMI, Normal appearance, PERRL


Pupil Exam: NORMAL ACCOMODATION, PERRL





- ENT Exam


ENT Exam: Mucous Membranes Moist, Normal Exam





- Neck Exam


Neck Exam: Full ROM, Normal Inspection.  absent: Lymphadenopathy





- Respiratory Exam


Respiratory Exam: Clear to Ausculation Bilateral, NORMAL BREATHING PATTERN





- Cardiovascular Exam


Cardiovascular Exam: REGULAR RHYTHM, RRR, +S1, +S2.  absent: Murmur





- GI/Abdominal Exam


GI & Abdominal Exam: Soft, Normal Bowel Sounds.  absent: Tenderness





- Extremities Exam


Extremities Exam: Full ROM, Normal Capillary Refill, Normal Inspection.  absent

: Joint Swelling, Pedal Edema





- Back Exam


Back Exam: NORMAL INSPECTION





- Neurological Exam


Neurological Exam: Alert, Awake, CN II-XII Intact, Normal Gait, Oriented x3





- Psychiatric Exam


Psychiatric exam: Normal Affect, Normal Mood





- Skin


Skin Exam: Dry, Intact, Normal Color, Warm





Assessment and Plan


(1) DVT prophylaxis


Status: Chronic   





(2) Scrotal edema


Status: Chronic   





(3) CAD (coronary artery disease)


Status: Chronic   





(4) Smoking


Status: Chronic   





(5) Low back pain


Status: Chronic   





(6) Prediabetes


Status: Chronic   





(7) Neurocognitive disorder


Status: Chronic

## 2018-02-22 RX ADMIN — DIVALPROEX SODIUM SCH MG: 250 TABLET, DELAYED RELEASE ORAL at 09:05

## 2018-02-22 RX ADMIN — DIVALPROEX SODIUM SCH MG: 250 TABLET, DELAYED RELEASE ORAL at 17:17

## 2018-02-22 NOTE — CP.PCM.PN
Subjective





- Date & Time of Evaluation


Date of Evaluation: 02/22/18


Time of Evaluation: 07:52





- Subjective


Subjective: 





s/o no f/c, n/v/d. no other complaints.calm and cooperative. no abd pain, 


a/p cont plan, cont placement, cont all medical management, cont psych meds


remains unable to make medical decisians for self. pending state disability/

guardianship/placement





Objective





- Vital Signs/Intake and Output


Vital Signs (last 24 hours): 


 











Temp Pulse Resp BP Pulse Ox


 


 98.4 F   75   16   106/72   96 


 


 02/21/18 17:00  02/21/18 21:20  02/21/18 17:00  02/21/18 21:20  02/21/18 17:00











- Medications


Medications: 


 Current Medications





Atorvastatin Calcium (Lipitor)  20 mg PO HS [facility]


   Last Admin: 02/21/18 21:20 Dose:  20 mg


Divalproex Sodium (Depakote Dr(*Bid*))  500 mg PO BID [facility]


   Last Admin: 02/21/18 16:31 Dose:  500 mg


Enalapril Maleate (Vasotec)  10 mg PO DAILY [facility]


   Last Admin: 02/21/18 08:55 Dose:  Not Given


Fenofibrate (Tricor)  145 mg PO DAILY [facility]


   Last Admin: 02/21/18 08:55 Dose:  145 mg


Metoprolol Tartrate (Lopressor)  50 mg PO Q12 [facility]


   Last Admin: 02/21/18 21:20 Dose:  50 mg


Quetiapine Fumarate (Seroquel)  50 mg PO Q12 [facility]


   Last Admin: 02/21/18 21:20 Dose:  50 mg











- Labs


Labs: 


 





 12/19/17 05:45 





 12/19/17 05:45 





 











PT  11.2 SECONDS (9.4-12.0)   12/14/15  05:20    


 


INR  1.05  (0.89-1.20)   12/14/15  05:20    


 


APTT  36.2 SECONDS (23.1-32.0)  H  12/14/15  05:20    














- Constitutional


Appears: Well, Non-toxic, No Acute Distress





- Head Exam


Head Exam: ATRAUMATIC, NORMAL INSPECTION, NORMOCEPHALIC





- Eye Exam


Eye Exam: EOMI, Normal appearance, PERRL


Pupil Exam: NORMAL ACCOMODATION, PERRL





- ENT Exam


ENT Exam: Mucous Membranes Moist, Normal Exam





- Neck Exam


Neck Exam: Full ROM, Normal Inspection.  absent: Lymphadenopathy





- Respiratory Exam


Respiratory Exam: Clear to Ausculation Bilateral, NORMAL BREATHING PATTERN





- Cardiovascular Exam


Cardiovascular Exam: REGULAR RHYTHM, RRR, +S1, +S2.  absent: Murmur





- GI/Abdominal Exam


GI & Abdominal Exam: Soft, Normal Bowel Sounds.  absent: Tenderness





- Extremities Exam


Extremities Exam: Full ROM, Normal Capillary Refill, Normal Inspection.  absent

: Joint Swelling, Pedal Edema





- Back Exam


Back Exam: NORMAL INSPECTION





- Neurological Exam


Neurological Exam: Alert, Awake, CN II-XII Intact, Normal Gait, Oriented x3





- Psychiatric Exam


Psychiatric exam: Normal Affect, Normal Mood





- Skin


Skin Exam: Dry, Intact, Normal Color, Warm





Assessment and Plan


(1) DVT prophylaxis


Status: Chronic   





(2) Scrotal edema


Status: Chronic   





(3) CAD (coronary artery disease)


Status: Chronic   





(4) Smoking


Status: Chronic   





(5) Low back pain


Status: Chronic   





(6) Prediabetes


Status: Chronic   





(7) Neurocognitive disorder


Status: Chronic

## 2018-02-23 RX ADMIN — DIVALPROEX SODIUM SCH MG: 250 TABLET, DELAYED RELEASE ORAL at 08:54

## 2018-02-23 RX ADMIN — DIVALPROEX SODIUM SCH MG: 250 TABLET, DELAYED RELEASE ORAL at 17:33

## 2018-02-23 NOTE — CP.PCM.PN
Subjective





- Date & Time of Evaluation


Date of Evaluation: 02/23/18


Time of Evaluation: 07:17





- Subjective


Subjective: 





s/o no f/c, n/v/d. no other complaints.calm and cooperative. no abd pain, 


a/p cont plan, cont placement, cont all medical management, cont psych meds


remains unable to make medical decisians for self. pending state disability/

guardianship/placement





Objective





- Vital Signs/Intake and Output


Vital Signs (last 24 hours): 


 











Temp Pulse Resp BP Pulse Ox


 


 98.3 F   81   18   109/73   96 


 


 02/23/18 00:21  02/23/18 00:21  02/23/18 00:21  02/23/18 00:21  02/23/18 00:21











- Medications


Medications: 


 Current Medications





Atorvastatin Calcium (Lipitor)  20 mg PO HS [facility]


   Last Admin: 02/22/18 21:59 Dose:  20 mg


Divalproex Sodium (Depakote Dr(*Bid*))  500 mg PO BID [facility]


   Last Admin: 02/22/18 17:17 Dose:  500 mg


Enalapril Maleate (Vasotec)  10 mg PO DAILY [facility]


   Last Admin: 02/22/18 09:05 Dose:  10 mg


Fenofibrate (Tricor)  145 mg PO DAILY [facility]


   Last Admin: 02/22/18 09:05 Dose:  145 mg


Metoprolol Tartrate (Lopressor)  50 mg PO Q12 [facility]


   Last Admin: 02/22/18 21:59 Dose:  50 mg


Quetiapine Fumarate (Seroquel)  50 mg PO Q12 [facility]


   Last Admin: 02/22/18 21:59 Dose:  50 mg











- Labs


Labs: 


 





 12/19/17 05:45 





 12/19/17 05:45 





 











PT  11.2 SECONDS (9.4-12.0)   12/14/15  05:20    


 


INR  1.05  (0.89-1.20)   12/14/15  05:20    


 


APTT  36.2 SECONDS (23.1-32.0)  H  12/14/15  05:20    














- Constitutional


Appears: Well, Non-toxic, No Acute Distress





- Head Exam


Head Exam: ATRAUMATIC, NORMAL INSPECTION, NORMOCEPHALIC





- Eye Exam


Eye Exam: EOMI, Normal appearance, PERRL


Pupil Exam: NORMAL ACCOMODATION, PERRL





- ENT Exam


ENT Exam: Mucous Membranes Moist, Normal Exam





- Neck Exam


Neck Exam: Full ROM, Normal Inspection.  absent: Lymphadenopathy





- Respiratory Exam


Respiratory Exam: Clear to Ausculation Bilateral, NORMAL BREATHING PATTERN





- Cardiovascular Exam


Cardiovascular Exam: REGULAR RHYTHM, RRR, +S1, +S2.  absent: Murmur





- GI/Abdominal Exam


GI & Abdominal Exam: Soft, Normal Bowel Sounds.  absent: Tenderness





- Extremities Exam


Extremities Exam: Full ROM, Normal Capillary Refill, Normal Inspection.  absent

: Joint Swelling, Pedal Edema





- Back Exam


Back Exam: NORMAL INSPECTION





- Neurological Exam


Neurological Exam: Alert, Awake, CN II-XII Intact, Normal Gait, Oriented x3





- Psychiatric Exam


Psychiatric exam: Normal Affect, Normal Mood





- Skin


Skin Exam: Dry, Intact, Normal Color, Warm





Assessment and Plan


(1) DVT prophylaxis


Status: Chronic   





(2) Scrotal edema


Status: Chronic   





(3) CAD (coronary artery disease)


Status: Chronic   





(4) Smoking


Status: Chronic   





(5) Low back pain


Status: Chronic   





(6) Prediabetes


Status: Chronic   





(7) Neurocognitive disorder


Status: Chronic

## 2018-02-24 RX ADMIN — DIVALPROEX SODIUM SCH MG: 250 TABLET, DELAYED RELEASE ORAL at 17:20

## 2018-02-24 RX ADMIN — DIVALPROEX SODIUM SCH MG: 250 TABLET, DELAYED RELEASE ORAL at 09:04

## 2018-02-24 NOTE — CP.PCM.PN
Subjective





- Date & Time of Evaluation


Date of Evaluation: 02/24/18


Time of Evaluation: 09:17





- Subjective


Subjective: 





s/o no f/c, n/v/d. no other complaints.calm and cooperative. no abd pain, 


a/p cont plan, cont placement, cont all medical management, cont psych meds


remains unable to make medical decisians for self. pending state disability/

guardianship/placement





Objective





- Vital Signs/Intake and Output


Vital Signs (last 24 hours): 


 











Temp Pulse Resp BP Pulse Ox


 


 97.4 F L  69   19   110/76   98 


 


 02/24/18 08:02  02/24/18 09:05  02/24/18 08:02  02/24/18 09:05  02/24/18 08:02











- Medications


Medications: 


 Current Medications





Atorvastatin Calcium (Lipitor)  20 mg PO HS [facility]


   Last Admin: 02/23/18 21:27 Dose:  20 mg


Divalproex Sodium (Depakote Dr(*Bid*))  500 mg PO BID [facility]


   Last Admin: 02/24/18 09:04 Dose:  500 mg


Enalapril Maleate (Vasotec)  10 mg PO DAILY [facility]


   Last Admin: 02/24/18 09:08 Dose:  10 mg


Fenofibrate (Tricor)  145 mg PO DAILY [facility]


   Last Admin: 02/24/18 09:06 Dose:  145 mg


Metoprolol Tartrate (Lopressor)  50 mg PO Q12 [facility]


   Last Admin: 02/24/18 09:05 Dose:  50 mg


Quetiapine Fumarate (Seroquel)  50 mg PO Q12 [facility]


   Last Admin: 02/24/18 09:06 Dose:  50 mg











- Labs


Labs: 


 





 12/19/17 05:45 





 12/19/17 05:45 





 











PT  11.2 SECONDS (9.4-12.0)   12/14/15  05:20    


 


INR  1.05  (0.89-1.20)   12/14/15  05:20    


 


APTT  36.2 SECONDS (23.1-32.0)  H  12/14/15  05:20    














- Constitutional


Appears: Well, Non-toxic, No Acute Distress





- Head Exam


Head Exam: ATRAUMATIC, NORMAL INSPECTION, NORMOCEPHALIC





- Eye Exam


Eye Exam: EOMI, Normal appearance, PERRL


Pupil Exam: NORMAL ACCOMODATION, PERRL





- ENT Exam


ENT Exam: Mucous Membranes Moist, Normal Exam





- Neck Exam


Neck Exam: Full ROM, Normal Inspection.  absent: Lymphadenopathy





- Respiratory Exam


Respiratory Exam: Clear to Ausculation Bilateral, NORMAL BREATHING PATTERN





- Cardiovascular Exam


Cardiovascular Exam: REGULAR RHYTHM, RRR, +S1, +S2.  absent: Murmur





- GI/Abdominal Exam


GI & Abdominal Exam: Soft, Normal Bowel Sounds.  absent: Tenderness





- Extremities Exam


Extremities Exam: Full ROM, Normal Capillary Refill, Normal Inspection.  absent

: Joint Swelling, Pedal Edema





- Back Exam


Back Exam: NORMAL INSPECTION





- Neurological Exam


Neurological Exam: Alert, Awake, CN II-XII Intact, Normal Gait, Oriented x3





- Psychiatric Exam


Psychiatric exam: Normal Affect, Normal Mood





- Skin


Skin Exam: Dry, Intact, Normal Color, Warm





Assessment and Plan


(1) DVT prophylaxis


Status: Chronic   





(2) Scrotal edema


Status: Chronic   





(3) CAD (coronary artery disease)


Status: Chronic   





(4) Smoking


Status: Chronic   





(5) Low back pain


Status: Chronic   





(6) Prediabetes


Status: Chronic   





(7) Neurocognitive disorder


Status: Chronic

## 2018-02-25 RX ADMIN — DIVALPROEX SODIUM SCH MG: 250 TABLET, DELAYED RELEASE ORAL at 09:14

## 2018-02-25 RX ADMIN — DIVALPROEX SODIUM SCH MG: 250 TABLET, DELAYED RELEASE ORAL at 17:11

## 2018-02-25 NOTE — CP.PCM.PN
Subjective





- Date & Time of Evaluation


Date of Evaluation: 02/25/18


Time of Evaluation: 10:07





- Subjective


Subjective: 





s/o no f/c, n/v/d. no other complaints.calm and cooperative. no abd pain, 


a/p cont plan, cont placement, cont all medical management, cont psych meds


remains unable to make medical decisians for self. pending state disability/

guardianship/placement





Objective





- Vital Signs/Intake and Output


Vital Signs (last 24 hours): 


 











Temp Pulse Resp BP Pulse Ox


 


 97.9 F   100 H  20   99/65 L  96 


 


 02/25/18 08:35  02/25/18 08:35  02/25/18 08:35  02/25/18 08:35  02/25/18 08:35











- Medications


Medications: 


 Current Medications





Atorvastatin Calcium (Lipitor)  20 mg PO HS [facility]


   Last Admin: 02/24/18 21:26 Dose:  20 mg


Divalproex Sodium (Depakote Dr(*Bid*))  500 mg PO BID [facility]


   Last Admin: 02/25/18 09:14 Dose:  500 mg


Enalapril Maleate (Vasotec)  10 mg PO DAILY [facility]


   Last Admin: 02/25/18 09:15 Dose:  10 mg


Fenofibrate (Tricor)  145 mg PO DAILY [facility]


   Last Admin: 02/25/18 09:14 Dose:  145 mg


Metoprolol Tartrate (Lopressor)  50 mg PO Q12 [facility]


   Last Admin: 02/25/18 09:15 Dose:  50 mg


Quetiapine Fumarate (Seroquel)  50 mg PO Q12 [facility]


   Last Admin: 02/25/18 09:14 Dose:  50 mg











- Labs


Labs: 


 





 12/19/17 05:45 





 12/19/17 05:45 





 











PT  11.2 SECONDS (9.4-12.0)   12/14/15  05:20    


 


INR  1.05  (0.89-1.20)   12/14/15  05:20    


 


APTT  36.2 SECONDS (23.1-32.0)  H  12/14/15  05:20    














- Constitutional


Appears: Well, Non-toxic, No Acute Distress





- Head Exam


Head Exam: ATRAUMATIC, NORMAL INSPECTION, NORMOCEPHALIC





- Eye Exam


Eye Exam: EOMI, Normal appearance, PERRL


Pupil Exam: NORMAL ACCOMODATION, PERRL





- ENT Exam


ENT Exam: Mucous Membranes Moist, Normal Exam





- Neck Exam


Neck Exam: Full ROM, Normal Inspection.  absent: Lymphadenopathy





- Respiratory Exam


Respiratory Exam: Clear to Ausculation Bilateral, NORMAL BREATHING PATTERN





- Cardiovascular Exam


Cardiovascular Exam: REGULAR RHYTHM, RRR, +S1, +S2.  absent: Murmur





- GI/Abdominal Exam


GI & Abdominal Exam: Soft, Normal Bowel Sounds.  absent: Tenderness





-  Exam


 Exam: Circumcision





- Extremities Exam


Extremities Exam: Full ROM, Normal Capillary Refill, Normal Inspection.  absent

: Joint Swelling, Pedal Edema





- Back Exam


Back Exam: NORMAL INSPECTION





- Neurological Exam


Neurological Exam: Alert, Awake, CN II-XII Intact, Normal Gait, Oriented x3





- Psychiatric Exam


Psychiatric exam: Normal Affect, Normal Mood





- Skin


Skin Exam: Dry, Intact, Normal Color, Warm





Assessment and Plan


(1) DVT prophylaxis


Status: Chronic   





(2) Scrotal edema


Status: Chronic   





(3) CAD (coronary artery disease)


Status: Chronic   





(4) Smoking


Status: Chronic   





(5) Low back pain


Status: Chronic   





(6) Prediabetes


Status: Chronic   





(7) Neurocognitive disorder


Status: Chronic

## 2018-02-26 RX ADMIN — DIVALPROEX SODIUM SCH MG: 250 TABLET, DELAYED RELEASE ORAL at 10:06

## 2018-02-26 RX ADMIN — DIVALPROEX SODIUM SCH MG: 250 TABLET, DELAYED RELEASE ORAL at 16:55

## 2018-02-26 NOTE — CP.PCM.PN
Subjective





- Date & Time of Evaluation


Date of Evaluation: 02/26/18


Time of Evaluation: 09:00





- Subjective


Subjective: 





s/o no f/c, n/v/d. no other complaints.calm and cooperative. no abd pain, 


a/p cont plan, cont placement, cont all medical management, cont psych meds


remains unable to make medical decisians for self. pending state disability/

guardianship/placement





Objective





- Vital Signs/Intake and Output


Vital Signs (last 24 hours): 


 











Temp Pulse Resp BP Pulse Ox


 


 97.9 F   76   18   95/74 L  97 


 


 02/26/18 08:15  02/26/18 08:15  02/26/18 08:15  02/26/18 08:15  02/26/18 08:15











- Medications


Medications: 


 Current Medications





Atorvastatin Calcium (Lipitor)  20 mg PO HS [facility]


   Last Admin: 02/25/18 21:31 Dose:  20 mg


Divalproex Sodium (Depakote Dr(*Bid*))  500 mg PO BID [facility]


   Last Admin: 02/25/18 17:11 Dose:  500 mg


Enalapril Maleate (Vasotec)  10 mg PO DAILY [facility]


   Last Admin: 02/25/18 09:15 Dose:  10 mg


Fenofibrate (Tricor)  145 mg PO DAILY [facility]


   Last Admin: 02/25/18 09:14 Dose:  145 mg


Metoprolol Tartrate (Lopressor)  50 mg PO Q12 [facility]


   Last Admin: 02/25/18 21:31 Dose:  50 mg


Quetiapine Fumarate (Seroquel)  50 mg PO Q12 [facility]


   Last Admin: 02/25/18 21:31 Dose:  50 mg











- Labs


Labs: 


 





 12/19/17 05:45 





 12/19/17 05:45 





 











PT  11.2 SECONDS (9.4-12.0)   12/14/15  05:20    


 


INR  1.05  (0.89-1.20)   12/14/15  05:20    


 


APTT  36.2 SECONDS (23.1-32.0)  H  12/14/15  05:20    














- Constitutional


Appears: Well, Non-toxic, No Acute Distress





- Head Exam


Head Exam: ATRAUMATIC, NORMAL INSPECTION, NORMOCEPHALIC





- Eye Exam


Eye Exam: EOMI, Normal appearance, PERRL


Pupil Exam: NORMAL ACCOMODATION, PERRL





- ENT Exam


ENT Exam: Mucous Membranes Moist, Normal Exam





- Neck Exam


Neck Exam: Full ROM, Normal Inspection.  absent: Lymphadenopathy





- Respiratory Exam


Respiratory Exam: Clear to Ausculation Bilateral, NORMAL BREATHING PATTERN





- Cardiovascular Exam


Cardiovascular Exam: REGULAR RHYTHM, RRR, +S1, +S2.  absent: Murmur





- GI/Abdominal Exam


GI & Abdominal Exam: Soft, Normal Bowel Sounds.  absent: Tenderness





- Extremities Exam


Extremities Exam: Full ROM, Normal Capillary Refill, Normal Inspection.  absent

: Joint Swelling, Pedal Edema





- Back Exam


Back Exam: NORMAL INSPECTION





- Neurological Exam


Neurological Exam: Alert, Awake, CN II-XII Intact, Normal Gait, Oriented x3





- Psychiatric Exam


Psychiatric exam: Normal Affect, Normal Mood





- Skin


Skin Exam: Dry, Intact, Normal Color, Warm





Assessment and Plan


(1) DVT prophylaxis


Status: Chronic   





(2) Scrotal edema


Status: Chronic   





(3) CAD (coronary artery disease)


Status: Chronic   





(4) Smoking


Status: Chronic   





(5) Low back pain


Status: Chronic   





(6) Prediabetes


Status: Chronic   





(7) Neurocognitive disorder


Status: Chronic

## 2018-02-27 RX ADMIN — DIVALPROEX SODIUM SCH MG: 250 TABLET, DELAYED RELEASE ORAL at 17:13

## 2018-02-27 RX ADMIN — DIVALPROEX SODIUM SCH MG: 250 TABLET, DELAYED RELEASE ORAL at 09:03

## 2018-02-27 NOTE — CP.PCM.PN
Subjective





- Date & Time of Evaluation


Date of Evaluation: 02/27/18


Time of Evaluation: 07:20





- Subjective


Subjective: 





s/o no f/c, n/v/d. no other complaints.calm and cooperative. no abd pain, 


a/p cont plan, cont placement, cont all medical management, cont psych meds


remains unable to make medical decisians for self. pending state disability/

guardianship/placement





Objective





- Vital Signs/Intake and Output


Vital Signs (last 24 hours): 


 











Temp Pulse Resp BP Pulse Ox


 


 98.2 F   74   18   120/78   97 


 


 02/27/18 00:37  02/27/18 00:37  02/27/18 00:37  02/27/18 00:37  02/27/18 00:37











- Medications


Medications: 


 Current Medications





Atorvastatin Calcium (Lipitor)  20 mg PO HS [facility]


   Last Admin: 02/26/18 21:34 Dose:  20 mg


Divalproex Sodium (Depakote Dr(*Bid*))  500 mg PO BID [facility]


   Last Admin: 02/26/18 16:55 Dose:  500 mg


Enalapril Maleate (Vasotec)  10 mg PO DAILY [facility]


   Last Admin: 02/26/18 10:07 Dose:  Not Given


Fenofibrate (Tricor)  145 mg PO DAILY [facility]


   Last Admin: 02/26/18 10:07 Dose:  145 mg


Metoprolol Tartrate (Lopressor)  50 mg PO Q12 [facility]


   Last Admin: 02/26/18 21:34 Dose:  50 mg


Quetiapine Fumarate (Seroquel)  50 mg PO Q12 [facility]


   Last Admin: 02/26/18 21:34 Dose:  50 mg











- Labs


Labs: 


 





 12/19/17 05:45 





 12/19/17 05:45 





 











PT  11.2 SECONDS (9.4-12.0)   12/14/15  05:20    


 


INR  1.05  (0.89-1.20)   12/14/15  05:20    


 


APTT  36.2 SECONDS (23.1-32.0)  H  12/14/15  05:20    














- Constitutional


Appears: Well, Non-toxic, No Acute Distress





- Head Exam


Head Exam: ATRAUMATIC, NORMAL INSPECTION, NORMOCEPHALIC





- Eye Exam


Eye Exam: EOMI, Normal appearance, PERRL


Pupil Exam: NORMAL ACCOMODATION, PERRL





- ENT Exam


ENT Exam: Mucous Membranes Moist, Normal Exam





- Neck Exam


Neck Exam: Full ROM, Normal Inspection.  absent: Lymphadenopathy





- Respiratory Exam


Respiratory Exam: Clear to Ausculation Bilateral, NORMAL BREATHING PATTERN





- Cardiovascular Exam


Cardiovascular Exam: REGULAR RHYTHM, RRR, +S1, +S2.  absent: Murmur





- GI/Abdominal Exam


GI & Abdominal Exam: Soft, Normal Bowel Sounds.  absent: Tenderness





- Rectal Exam


Rectal Exam: NORMAL INSPECTION





- Extremities Exam


Extremities Exam: Full ROM, Normal Capillary Refill, Normal Inspection.  absent

: Joint Swelling, Pedal Edema





- Back Exam


Back Exam: NORMAL INSPECTION





- Neurological Exam


Neurological Exam: Alert, Awake, CN II-XII Intact, Normal Gait, Oriented x3





- Psychiatric Exam


Psychiatric exam: Normal Affect, Normal Mood





- Skin


Skin Exam: Dry, Intact, Normal Color, Warm





Assessment and Plan


(1) DVT prophylaxis


Status: Chronic   





(2) Scrotal edema


Status: Chronic   





(3) CAD (coronary artery disease)


Status: Chronic   





(4) Smoking


Status: Chronic   





(5) Low back pain


Status: Chronic   





(6) Prediabetes


Status: Chronic   





(7) Neurocognitive disorder


Status: Chronic

## 2018-02-28 RX ADMIN — DIVALPROEX SODIUM SCH MG: 250 TABLET, DELAYED RELEASE ORAL at 09:35

## 2018-02-28 RX ADMIN — DIVALPROEX SODIUM SCH MG: 250 TABLET, DELAYED RELEASE ORAL at 17:10

## 2018-02-28 NOTE — CP.PCM.PN
Subjective





- Date & Time of Evaluation


Date of Evaluation: 02/28/18


Time of Evaluation: 08:32





- Subjective


Subjective: 





s/o no f/c, n/v/d. no other complaints.calm and cooperative. no abd pain, 


a/p cont plan, cont placement, cont all medical management, cont psych meds


remains unable to make medical decisians for self. pending state disability/

guardianship/placement





Objective





- Vital Signs/Intake and Output


Vital Signs (last 24 hours): 


 











Temp Pulse Resp BP Pulse Ox


 


 97.8 F   72   20   99/66 L  96 


 


 02/28/18 07:47  02/28/18 07:47  02/28/18 07:47  02/28/18 07:47  02/28/18 07:47











- Medications


Medications: 


 Current Medications





Atorvastatin Calcium (Lipitor)  20 mg PO HS [facility]


   Last Admin: 02/27/18 21:21 Dose:  20 mg


Divalproex Sodium (Depakote Dr(*Bid*))  500 mg PO BID [facility]


   Last Admin: 02/27/18 17:13 Dose:  500 mg


Enalapril Maleate (Vasotec)  10 mg PO DAILY [facility]


   Last Admin: 02/27/18 09:03 Dose:  10 mg


Fenofibrate (Tricor)  145 mg PO DAILY [facility]


   Last Admin: 02/27/18 09:03 Dose:  145 mg


Metoprolol Tartrate (Lopressor)  50 mg PO Q12 [facility]


   Last Admin: 02/27/18 21:21 Dose:  50 mg


Quetiapine Fumarate (Seroquel)  50 mg PO Q12 [facility]


   Last Admin: 02/27/18 21:21 Dose:  50 mg











- Labs


Labs: 


 





 12/19/17 05:45 





 12/19/17 05:45 





 











PT  11.2 SECONDS (9.4-12.0)   12/14/15  05:20    


 


INR  1.05  (0.89-1.20)   12/14/15  05:20    


 


APTT  36.2 SECONDS (23.1-32.0)  H  12/14/15  05:20    














- Constitutional


Appears: Well, Non-toxic, No Acute Distress





- Head Exam


Head Exam: ATRAUMATIC, NORMAL INSPECTION, NORMOCEPHALIC





- Eye Exam


Eye Exam: EOMI, Normal appearance, PERRL


Pupil Exam: NORMAL ACCOMODATION, PERRL





- ENT Exam


ENT Exam: Mucous Membranes Moist, Normal Exam





- Neck Exam


Neck Exam: Full ROM, Normal Inspection.  absent: Lymphadenopathy





- Respiratory Exam


Respiratory Exam: Clear to Ausculation Bilateral, NORMAL BREATHING PATTERN





- Cardiovascular Exam


Cardiovascular Exam: REGULAR RHYTHM, RRR, +S1, +S2.  absent: Murmur





- GI/Abdominal Exam


GI & Abdominal Exam: Soft, Normal Bowel Sounds.  absent: Tenderness





- Extremities Exam


Extremities Exam: Full ROM, Normal Capillary Refill, Normal Inspection.  absent

: Joint Swelling, Pedal Edema





- Back Exam


Back Exam: NORMAL INSPECTION





- Neurological Exam


Neurological Exam: Alert, Awake, CN II-XII Intact, Normal Gait, Oriented x3





- Psychiatric Exam


Psychiatric exam: Normal Affect, Normal Mood





- Skin


Skin Exam: Dry, Intact, Normal Color, Warm





Assessment and Plan


(1) DVT prophylaxis


Status: Chronic   





(2) Scrotal edema


Status: Chronic   





(3) CAD (coronary artery disease)


Status: Chronic   





(4) Smoking


Status: Chronic   





(5) Low back pain


Status: Chronic   





(6) Prediabetes


Status: Chronic   





(7) Neurocognitive disorder


Status: Chronic

## 2018-03-01 RX ADMIN — DIVALPROEX SODIUM SCH MG: 250 TABLET, DELAYED RELEASE ORAL at 16:36

## 2018-03-01 RX ADMIN — DIVALPROEX SODIUM SCH MG: 250 TABLET, DELAYED RELEASE ORAL at 09:27

## 2018-03-01 NOTE — CP.PCM.PN
Subjective





- Date & Time of Evaluation


Date of Evaluation: 03/01/18


Time of Evaluation: 08:18





- Subjective


Subjective: 





s/o no f/c, n/v/d. no other complaints.calm and cooperative. no abd pain, 


a/p cont plan, cont placement, cont all medical management, cont psych meds


remains unable to make medical decisians for self. pending state disability/

guardianship/placement





Objective





- Vital Signs/Intake and Output


Vital Signs (last 24 hours): 


 











Temp Pulse Resp BP Pulse Ox


 


 97.8 F   70   18   100/68   96 


 


 03/01/18 08:00  03/01/18 08:00  03/01/18 08:00  03/01/18 08:00  03/01/18 08:00











- Medications


Medications: 


 Current Medications





Atorvastatin Calcium (Lipitor)  20 mg PO HS [facility]


   Last Admin: 02/28/18 21:16 Dose:  20 mg


Divalproex Sodium (Depakote Dr(*Bid*))  500 mg PO BID [facility]


   Last Admin: 02/28/18 17:10 Dose:  500 mg


Enalapril Maleate (Vasotec)  10 mg PO DAILY [facility]


   Last Admin: 02/28/18 09:36 Dose:  10 mg


Fenofibrate (Tricor)  145 mg PO DAILY [facility]


   Last Admin: 02/28/18 09:35 Dose:  145 mg


Metoprolol Tartrate (Lopressor)  50 mg PO Q12 [facility]


   Last Admin: 02/28/18 21:15 Dose:  50 mg


Quetiapine Fumarate (Seroquel)  50 mg PO Q12 [facility]


   Last Admin: 02/28/18 21:16 Dose:  50 mg











- Labs


Labs: 


 





 12/19/17 05:45 





 12/19/17 05:45 





 











PT  11.2 SECONDS (9.4-12.0)   12/14/15  05:20    


 


INR  1.05  (0.89-1.20)   12/14/15  05:20    


 


APTT  36.2 SECONDS (23.1-32.0)  H  12/14/15  05:20    














- Constitutional


Appears: Well, Non-toxic, No Acute Distress





- Head Exam


Head Exam: ATRAUMATIC, NORMAL INSPECTION, NORMOCEPHALIC





- Eye Exam


Eye Exam: EOMI, Normal appearance, PERRL


Pupil Exam: NORMAL ACCOMODATION, PERRL





- ENT Exam


ENT Exam: Mucous Membranes Moist, Normal Exam





- Neck Exam


Neck Exam: Full ROM, Normal Inspection.  absent: Lymphadenopathy





- Respiratory Exam


Respiratory Exam: Clear to Ausculation Bilateral, NORMAL BREATHING PATTERN





- Cardiovascular Exam


Cardiovascular Exam: REGULAR RHYTHM, RRR, +S1, +S2.  absent: Murmur





- GI/Abdominal Exam


GI & Abdominal Exam: Soft, Normal Bowel Sounds.  absent: Tenderness





- Extremities Exam


Extremities Exam: Full ROM, Normal Capillary Refill, Normal Inspection.  absent

: Joint Swelling, Pedal Edema





- Back Exam


Back Exam: NORMAL INSPECTION





- Neurological Exam


Neurological Exam: Alert, Awake, CN II-XII Intact, Normal Gait, Oriented x3





- Psychiatric Exam


Psychiatric exam: Normal Affect, Normal Mood





- Skin


Skin Exam: Dry, Intact, Normal Color, Warm





Assessment and Plan


(1) DVT prophylaxis


Status: Chronic   





(2) Scrotal edema


Status: Chronic   





(3) CAD (coronary artery disease)


Status: Chronic   





(4) Smoking


Status: Chronic   





(5) Low back pain


Status: Chronic   





(6) Prediabetes


Status: Chronic   





(7) Neurocognitive disorder


Status: Chronic

## 2018-03-02 RX ADMIN — DIVALPROEX SODIUM SCH MG: 250 TABLET, DELAYED RELEASE ORAL at 08:30

## 2018-03-02 RX ADMIN — DIVALPROEX SODIUM SCH MG: 250 TABLET, DELAYED RELEASE ORAL at 17:19

## 2018-03-02 NOTE — CP.PCM.PN
Subjective





- Date & Time of Evaluation


Date of Evaluation: 03/02/18


Time of Evaluation: 14:25





- Subjective


Subjective: 





s/o no f/c, n/v/d. no other complaints.calm and cooperative. no abd pain, 


a/p cont plan, cont placement, cont all medical management, cont psych meds


remains unable to make medical decisians for self. pending state disability/

guardianship/placement








Objective





- Vital Signs/Intake and Output


Vital Signs (last 24 hours): 


 











Temp Pulse Resp BP Pulse Ox


 


 97.5 F L  73   20   116/73   97 


 


 03/02/18 08:07  03/02/18 08:07  03/02/18 08:07  03/02/18 08:07  03/02/18 08:07











- Medications


Medications: 


 Current Medications





Atorvastatin Calcium (Lipitor)  20 mg PO HS [facility]


   Last Admin: 03/01/18 21:12 Dose:  20 mg


Divalproex Sodium (Depakote Dr(*Bid*))  500 mg PO BID [facility]


   Last Admin: 03/02/18 08:30 Dose:  500 mg


Enalapril Maleate (Vasotec)  10 mg PO DAILY [facility]


   Last Admin: 03/02/18 08:30 Dose:  10 mg


Fenofibrate (Tricor)  145 mg PO DAILY [facility]


   Last Admin: 03/02/18 08:31 Dose:  145 mg


Metoprolol Tartrate (Lopressor)  50 mg PO Q12 [facility]


   Last Admin: 03/02/18 08:31 Dose:  50 mg


Quetiapine Fumarate (Seroquel)  50 mg PO Q12 [facility]


   Last Admin: 03/02/18 08:30 Dose:  50 mg











- Labs


Labs: 


 





 12/19/17 05:45 





 12/19/17 05:45 





 











PT  11.2 SECONDS (9.4-12.0)   12/14/15  05:20    


 


INR  1.05  (0.89-1.20)   12/14/15  05:20    


 


APTT  36.2 SECONDS (23.1-32.0)  H  12/14/15  05:20    














- Constitutional


Appears: Well, Non-toxic, No Acute Distress





- Head Exam


Head Exam: ATRAUMATIC, NORMAL INSPECTION, NORMOCEPHALIC





- Eye Exam


Eye Exam: EOMI, Normal appearance, PERRL


Pupil Exam: NORMAL ACCOMODATION, PERRL





- ENT Exam


ENT Exam: Mucous Membranes Moist, Normal Exam





- Neck Exam


Neck Exam: Full ROM, Normal Inspection.  absent: Lymphadenopathy





- Respiratory Exam


Respiratory Exam: Clear to Ausculation Bilateral, NORMAL BREATHING PATTERN





- Cardiovascular Exam


Cardiovascular Exam: REGULAR RHYTHM, RRR, +S1, +S2.  absent: Murmur





- GI/Abdominal Exam


GI & Abdominal Exam: Soft, Normal Bowel Sounds.  absent: Tenderness





- Extremities Exam


Extremities Exam: Full ROM, Normal Capillary Refill, Normal Inspection.  absent

: Joint Swelling, Pedal Edema





- Back Exam


Back Exam: NORMAL INSPECTION





- Neurological Exam


Neurological Exam: Alert, Awake, CN II-XII Intact, Normal Gait, Oriented x3





- Psychiatric Exam


Psychiatric exam: Normal Affect, Normal Mood





- Skin


Skin Exam: Dry, Intact, Normal Color, Warm





Assessment and Plan


(1) DVT prophylaxis


Status: Chronic   





(2) Scrotal edema


Status: Chronic   





(3) CAD (coronary artery disease)


Status: Chronic   





(4) Smoking


Status: Chronic   





(5) Low back pain


Status: Chronic   





(6) Prediabetes


Status: Chronic   





(7) Neurocognitive disorder


Status: Chronic

## 2018-03-03 RX ADMIN — DIVALPROEX SODIUM SCH MG: 250 TABLET, DELAYED RELEASE ORAL at 08:22

## 2018-03-03 RX ADMIN — DIVALPROEX SODIUM SCH MG: 250 TABLET, DELAYED RELEASE ORAL at 16:09

## 2018-03-04 RX ADMIN — DIVALPROEX SODIUM SCH MG: 250 TABLET, DELAYED RELEASE ORAL at 16:14

## 2018-03-04 RX ADMIN — DIVALPROEX SODIUM SCH MG: 250 TABLET, DELAYED RELEASE ORAL at 08:58

## 2018-03-04 NOTE — CP.PCM.PN
Subjective





- Date & Time of Evaluation


Date of Evaluation: 03/04/18


Time of Evaluation: 10:28





- Subjective


Subjective: 





s/o no f/c, n/v/d. no other complaints.calm and cooperative. no abd pain, 


a/p cont plan, cont placement, cont all medical management, cont psych meds


remains unable to make medical decisians for self. pending state disability/

guardianship/placement








Objective





- Vital Signs/Intake and Output


Vital Signs (last 24 hours): 


 











Temp Pulse Resp BP Pulse Ox


 


 98.0 F   65   19   123/86   98 


 


 03/04/18 07:55  03/04/18 08:59  03/04/18 07:55  03/04/18 08:59  03/04/18 07:55











- Medications


Medications: 


 Current Medications





Atorvastatin Calcium (Lipitor)  20 mg PO HS [facility]


   Last Admin: 03/03/18 21:51 Dose:  20 mg


Divalproex Sodium (Depakote Dr(*Bid*))  500 mg PO BID [facility]


   Last Admin: 03/04/18 08:58 Dose:  500 mg


Enalapril Maleate (Vasotec)  10 mg PO DAILY [facility]


   Last Admin: 03/04/18 08:58 Dose:  10 mg


Fenofibrate (Tricor)  145 mg PO DAILY [facility]


   Last Admin: 03/04/18 08:58 Dose:  145 mg


Metoprolol Tartrate (Lopressor)  50 mg PO Q12 [facility]


   Last Admin: 03/04/18 08:59 Dose:  50 mg


Quetiapine Fumarate (Seroquel)  50 mg PO Q12 [facility]


   Last Admin: 03/04/18 08:58 Dose:  50 mg











- Labs


Labs: 


 





 12/19/17 05:45 





 12/19/17 05:45 





 











PT  11.2 SECONDS (9.4-12.0)   12/14/15  05:20    


 


INR  1.05  (0.89-1.20)   12/14/15  05:20    


 


APTT  36.2 SECONDS (23.1-32.0)  H  12/14/15  05:20    














- Constitutional


Appears: Well, Non-toxic, No Acute Distress





- Head Exam


Head Exam: ATRAUMATIC, NORMAL INSPECTION, NORMOCEPHALIC





- Eye Exam


Eye Exam: EOMI, Normal appearance, PERRL


Pupil Exam: NORMAL ACCOMODATION, PERRL





- ENT Exam


ENT Exam: Mucous Membranes Moist, Normal Exam





- Neck Exam


Neck Exam: Full ROM, Normal Inspection.  absent: Lymphadenopathy





- Respiratory Exam


Respiratory Exam: Clear to Ausculation Bilateral, NORMAL BREATHING PATTERN





- Cardiovascular Exam


Cardiovascular Exam: REGULAR RHYTHM, +S1, +S2.  absent: Murmur





- GI/Abdominal Exam


GI & Abdominal Exam: Soft, Normal Bowel Sounds.  absent: Tenderness





- Extremities Exam


Extremities Exam: Full ROM, Normal Capillary Refill, Normal Inspection.  absent

: Joint Swelling, Pedal Edema





- Back Exam


Back Exam: NORMAL INSPECTION





- Neurological Exam


Neurological Exam: Alert, Awake, CN II-XII Intact, Normal Gait, Oriented x3





- Psychiatric Exam


Psychiatric exam: Normal Affect, Normal Mood





- Skin


Skin Exam: Dry, Intact, Normal Color, Warm





Assessment and Plan


(1) DVT prophylaxis


Status: Chronic   





(2) Scrotal edema


Status: Chronic   





(3) CAD (coronary artery disease)


Status: Chronic   





(4) Smoking


Status: Chronic   





(5) Low back pain


Status: Chronic   





(6) Prediabetes


Status: Chronic   





(7) Neurocognitive disorder


Status: Chronic

## 2018-03-05 RX ADMIN — DIVALPROEX SODIUM SCH MG: 250 TABLET, DELAYED RELEASE ORAL at 09:18

## 2018-03-05 RX ADMIN — DIVALPROEX SODIUM SCH MG: 250 TABLET, DELAYED RELEASE ORAL at 17:31

## 2018-03-05 NOTE — CP.PCM.PN
Subjective





- Date & Time of Evaluation


Date of Evaluation: 03/05/18


Time of Evaluation: 08:23





- Subjective


Subjective: 





s/o no f/c, n/v/d. no other complaints.calm and cooperative. no abd pain, 


a/p cont plan, cont placement, cont all medical management, cont psych meds


remains unable to make medical decisians for self. pending state disability/

guardianship/placement








Objective





- Vital Signs/Intake and Output


Vital Signs (last 24 hours): 


 











Temp Pulse Resp BP Pulse Ox


 


 97.7 F   63   19   101/70   97 


 


 03/05/18 08:00  03/05/18 08:00  03/05/18 08:00  03/05/18 08:00  03/05/18 08:00











- Medications


Medications: 


 Current Medications





Atorvastatin Calcium (Lipitor)  20 mg PO HS [facility]


   Last Admin: 03/04/18 21:11 Dose:  20 mg


Divalproex Sodium (Depakote Dr(*Bid*))  500 mg PO BID [facility]


   Last Admin: 03/04/18 16:14 Dose:  500 mg


Enalapril Maleate (Vasotec)  10 mg PO DAILY [facility]


   Last Admin: 03/04/18 08:58 Dose:  10 mg


Fenofibrate (Tricor)  145 mg PO DAILY [facility]


   Last Admin: 03/04/18 08:58 Dose:  145 mg


Metoprolol Tartrate (Lopressor)  50 mg PO Q12 [facility]


   Last Admin: 03/04/18 21:11 Dose:  50 mg


Quetiapine Fumarate (Seroquel)  50 mg PO Q12 [facility]


   Last Admin: 03/04/18 21:11 Dose:  50 mg











- Labs


Labs: 


 





 12/19/17 05:45 





 12/19/17 05:45 





 











PT  11.2 SECONDS (9.4-12.0)   12/14/15  05:20    


 


INR  1.05  (0.89-1.20)   12/14/15  05:20    


 


APTT  36.2 SECONDS (23.1-32.0)  H  12/14/15  05:20    














- Constitutional


Appears: Well, Non-toxic, No Acute Distress





- Head Exam


Head Exam: ATRAUMATIC, NORMAL INSPECTION, NORMOCEPHALIC





- Eye Exam


Eye Exam: EOMI, Normal appearance, PERRL


Pupil Exam: NORMAL ACCOMODATION, PERRL





- ENT Exam


ENT Exam: Mucous Membranes Moist, Normal Exam





- Neck Exam


Neck Exam: Full ROM, Normal Inspection.  absent: Lymphadenopathy





- Respiratory Exam


Respiratory Exam: Clear to Ausculation Bilateral, NORMAL BREATHING PATTERN





- Cardiovascular Exam


Cardiovascular Exam: REGULAR RHYTHM, RRR, +S1, +S2.  absent: Murmur





- GI/Abdominal Exam


GI & Abdominal Exam: Soft, Normal Bowel Sounds.  absent: Tenderness





- Extremities Exam


Extremities Exam: Full ROM, Normal Capillary Refill, Normal Inspection.  absent

: Joint Swelling, Pedal Edema





- Back Exam


Back Exam: NORMAL INSPECTION





- Neurological Exam


Neurological Exam: Alert, Awake, CN II-XII Intact, Normal Gait, Oriented x3





- Psychiatric Exam


Psychiatric exam: Normal Affect, Normal Mood





- Skin


Skin Exam: Dry, Intact, Normal Color, Warm





Assessment and Plan


(1) DVT prophylaxis


Status: Chronic   





(2) Scrotal edema


Status: Chronic   





(3) CAD (coronary artery disease)


Status: Chronic   





(4) Smoking


Status: Chronic   





(5) Low back pain


Status: Chronic   





(6) Prediabetes


Status: Chronic   





(7) Neurocognitive disorder


Status: Chronic

## 2018-03-06 RX ADMIN — DIVALPROEX SODIUM SCH MG: 250 TABLET, DELAYED RELEASE ORAL at 17:05

## 2018-03-06 RX ADMIN — DIVALPROEX SODIUM SCH MG: 250 TABLET, DELAYED RELEASE ORAL at 08:49

## 2018-03-06 NOTE — CP.PCM.PN
Subjective





- Date & Time of Evaluation


Date of Evaluation: 03/06/18


Time of Evaluation: 07:26





- Subjective


Subjective: 





s/o no f/c, n/v/d. no other complaints.calm and cooperative. no abd pain, 


a/p cont plan, cont placement, cont all medical management, cont psych meds


remains unable to make medical decisians for self. pending state disability/

guardianship/placement








Objective





- Vital Signs/Intake and Output


Vital Signs (last 24 hours): 


 











Temp Pulse Resp BP Pulse Ox


 


 97.8 F   75   20   97/66 L  96 


 


 03/05/18 23:43  03/05/18 23:43  03/05/18 23:43  03/05/18 23:43  03/05/18 23:43











- Medications


Medications: 


 Current Medications





Atorvastatin Calcium (Lipitor)  20 mg PO HS [facility]


   Last Admin: 03/05/18 21:21 Dose:  20 mg


Divalproex Sodium (Depakote Dr(*Bid*))  500 mg PO BID [facility]


   Last Admin: 03/05/18 17:31 Dose:  500 mg


Enalapril Maleate (Vasotec)  10 mg PO DAILY [facility]


   Last Admin: 03/05/18 17:31 Dose:  10 mg


Fenofibrate (Tricor)  145 mg PO DAILY [facility]


   Last Admin: 03/05/18 09:18 Dose:  145 mg


Metoprolol Tartrate (Lopressor)  50 mg PO Q12 [facility]


   Last Admin: 03/05/18 21:21 Dose:  50 mg


Quetiapine Fumarate (Seroquel)  50 mg PO Q12 [facility]


   Last Admin: 03/05/18 21:21 Dose:  50 mg











- Labs


Labs: 


 





 12/19/17 05:45 





 12/19/17 05:45 





 











PT  11.2 SECONDS (9.4-12.0)   12/14/15  05:20    


 


INR  1.05  (0.89-1.20)   12/14/15  05:20    


 


APTT  36.2 SECONDS (23.1-32.0)  H  12/14/15  05:20    














- Constitutional


Appears: Well, Non-toxic, No Acute Distress





- Head Exam


Head Exam: ATRAUMATIC, NORMAL INSPECTION, NORMOCEPHALIC





- Eye Exam


Eye Exam: EOMI, Normal appearance, PERRL


Pupil Exam: NORMAL ACCOMODATION, PERRL





- ENT Exam


ENT Exam: Mucous Membranes Moist, Normal Exam





- Neck Exam


Neck Exam: Full ROM, Normal Inspection.  absent: Lymphadenopathy





- Respiratory Exam


Respiratory Exam: Clear to Ausculation Bilateral, NORMAL BREATHING PATTERN





- Cardiovascular Exam


Cardiovascular Exam: REGULAR RHYTHM, RRR, +S1, +S2.  absent: Murmur





- GI/Abdominal Exam


GI & Abdominal Exam: Soft, Normal Bowel Sounds.  absent: Tenderness





- Extremities Exam


Extremities Exam: Full ROM, Normal Capillary Refill, Normal Inspection.  absent

: Joint Swelling, Pedal Edema





- Back Exam


Back Exam: NORMAL INSPECTION





- Neurological Exam


Neurological Exam: Alert, Awake, CN II-XII Intact, Normal Gait, Oriented x3





- Psychiatric Exam


Psychiatric exam: Normal Affect, Normal Mood





- Skin


Skin Exam: Dry, Intact, Normal Color, Warm





Assessment and Plan


(1) DVT prophylaxis


Status: Chronic   





(2) Scrotal edema


Status: Chronic   





(3) CAD (coronary artery disease)


Status: Chronic   





(4) Smoking


Status: Chronic   





(5) Low back pain


Status: Chronic   





(6) Prediabetes


Status: Chronic   





(7) Neurocognitive disorder


Status: Chronic

## 2018-03-07 RX ADMIN — DIVALPROEX SODIUM SCH MG: 250 TABLET, DELAYED RELEASE ORAL at 17:07

## 2018-03-07 RX ADMIN — DIVALPROEX SODIUM SCH MG: 250 TABLET, DELAYED RELEASE ORAL at 09:59

## 2018-03-07 NOTE — CP.PCM.PN
Subjective





- Date & Time of Evaluation


Date of Evaluation: 03/07/18


Time of Evaluation: 09:01





- Subjective


Subjective: 





s/o no f/c, n/v/d. no other complaints.calm and cooperative. no abd pain, 


a/p cont plan, cont placement, cont all medical management, cont psych meds


remains unable to make medical decisians for self. pending state disability/

guardianship/placement





Objective





- Vital Signs/Intake and Output


Vital Signs (last 24 hours): 


 











Temp Pulse Resp BP Pulse Ox


 


 97.6 F   59 L  20   96/62 L  99 


 


 03/07/18 08:11  03/07/18 08:11  03/07/18 08:11  03/07/18 08:11  03/07/18 08:11











- Medications


Medications: 


 Current Medications





Atorvastatin Calcium (Lipitor)  20 mg PO HS [facility]


   Last Admin: 03/06/18 21:10 Dose:  20 mg


Divalproex Sodium (Depakote Dr(*Bid*))  500 mg PO BID [facility]


   Last Admin: 03/06/18 17:05 Dose:  500 mg


Enalapril Maleate (Vasotec)  10 mg PO DAILY [facility]


   Last Admin: 03/06/18 08:50 Dose:  10 mg


Fenofibrate (Tricor)  145 mg PO DAILY [facility]


   Last Admin: 03/06/18 08:50 Dose:  145 mg


Metoprolol Tartrate (Lopressor)  50 mg PO Q12 [facility]


   Last Admin: 03/06/18 21:10 Dose:  50 mg


Quetiapine Fumarate (Seroquel)  50 mg PO Q12 [facility]


   Last Admin: 03/06/18 21:10 Dose:  50 mg











- Labs


Labs: 


 





 12/19/17 05:45 





 12/19/17 05:45 





 











PT  11.2 SECONDS (9.4-12.0)   12/14/15  05:20    


 


INR  1.05  (0.89-1.20)   12/14/15  05:20    


 


APTT  36.2 SECONDS (23.1-32.0)  H  12/14/15  05:20    














- Constitutional


Appears: Well, Non-toxic, No Acute Distress





- Head Exam


Head Exam: ATRAUMATIC, NORMAL INSPECTION, NORMOCEPHALIC





- Eye Exam


Eye Exam: EOMI, Normal appearance, PERRL


Pupil Exam: NORMAL ACCOMODATION, PERRL





- ENT Exam


ENT Exam: Mucous Membranes Moist, Normal Exam





- Neck Exam


Neck Exam: Full ROM, Normal Inspection.  absent: Lymphadenopathy





- Respiratory Exam


Respiratory Exam: Clear to Ausculation Bilateral, NORMAL BREATHING PATTERN





- Cardiovascular Exam


Cardiovascular Exam: REGULAR RHYTHM, RRR, +S1, +S2.  absent: Murmur





- GI/Abdominal Exam


GI & Abdominal Exam: Soft, Normal Bowel Sounds.  absent: Tenderness





- Extremities Exam


Extremities Exam: Full ROM, Normal Capillary Refill, Normal Inspection.  absent

: Joint Swelling, Pedal Edema





- Back Exam


Back Exam: NORMAL INSPECTION





- Neurological Exam


Neurological Exam: Alert, Awake, CN II-XII Intact, Normal Gait, Oriented x3





- Psychiatric Exam


Psychiatric exam: Normal Affect, Normal Mood





- Skin


Skin Exam: Dry, Intact, Normal Color, Warm





Assessment and Plan


(1) DVT prophylaxis


Status: Chronic   





(2) Scrotal edema


Status: Chronic   





(3) CAD (coronary artery disease)


Status: Chronic   





(4) Smoking


Status: Chronic   





(5) Low back pain


Status: Chronic   





(6) Prediabetes


Status: Chronic   





(7) Neurocognitive disorder


Status: Chronic

## 2018-03-08 RX ADMIN — DIVALPROEX SODIUM SCH MG: 250 TABLET, DELAYED RELEASE ORAL at 08:59

## 2018-03-08 RX ADMIN — DIVALPROEX SODIUM SCH MG: 250 TABLET, DELAYED RELEASE ORAL at 17:22

## 2018-03-08 NOTE — CP.PCM.PN
Subjective





- Date & Time of Evaluation


Date of Evaluation: 03/08/18


Time of Evaluation: 07:51





- Subjective


Subjective: 





s/o no f/c, n/v/d. no other complaints.calm and cooperative. no abd pain, 


a/p cont plan, cont placement, cont all medical management, cont psych meds


remains unable to make medical decisians for self. pending state disability/

guardianship/placement





Objective





- Vital Signs/Intake and Output


Vital Signs (last 24 hours): 


 











Temp Pulse Resp BP Pulse Ox


 


 97.8 F   77   20   110/76   96 


 


 03/07/18 23:40  03/07/18 23:40  03/07/18 23:40  03/07/18 23:40  03/07/18 23:40











- Medications


Medications: 


 Current Medications





Atorvastatin Calcium (Lipitor)  20 mg PO HS [facility]


   Last Admin: 03/07/18 21:05 Dose:  20 mg


Divalproex Sodium (Depakote Dr(*Bid*))  500 mg PO BID [facility]


   Last Admin: 03/07/18 17:07 Dose:  500 mg


Enalapril Maleate (Vasotec)  10 mg PO DAILY [facility]


   Last Admin: 03/07/18 10:02 Dose:  10 mg


Fenofibrate (Tricor)  145 mg PO DAILY [facility]


   Last Admin: 03/07/18 10:02 Dose:  145 mg


Metoprolol Tartrate (Lopressor)  50 mg PO Q12 [facility]


   Last Admin: 03/07/18 20:55 Dose:  50 mg


Quetiapine Fumarate (Seroquel)  50 mg PO Q12 [facility]


   Last Admin: 03/07/18 20:56 Dose:  50 mg











- Labs


Labs: 


 





 12/19/17 05:45 





 12/19/17 05:45 





 











PT  11.2 SECONDS (9.4-12.0)   12/14/15  05:20    


 


INR  1.05  (0.89-1.20)   12/14/15  05:20    


 


APTT  36.2 SECONDS (23.1-32.0)  H  12/14/15  05:20    














- Constitutional


Appears: Well, Non-toxic, No Acute Distress





- Head Exam


Head Exam: ATRAUMATIC, NORMAL INSPECTION, NORMOCEPHALIC





- Eye Exam


Eye Exam: EOMI, Normal appearance, PERRL


Pupil Exam: NORMAL ACCOMODATION, PERRL





- ENT Exam


ENT Exam: Mucous Membranes Moist, Normal Exam





- Neck Exam


Neck Exam: Full ROM, Normal Inspection.  absent: Lymphadenopathy





- Respiratory Exam


Respiratory Exam: Clear to Ausculation Bilateral, NORMAL BREATHING PATTERN





- Cardiovascular Exam


Cardiovascular Exam: REGULAR RHYTHM, RRR, +S1, +S2.  absent: Murmur





- GI/Abdominal Exam


GI & Abdominal Exam: Soft, Normal Bowel Sounds.  absent: Tenderness





- Extremities Exam


Extremities Exam: Full ROM, Normal Capillary Refill, Normal Inspection.  absent

: Joint Swelling, Pedal Edema





- Back Exam


Back Exam: NORMAL INSPECTION





- Neurological Exam


Neurological Exam: Alert, Awake, CN II-XII Intact, Normal Gait, Oriented x3





- Psychiatric Exam


Psychiatric exam: Normal Affect, Normal Mood





- Skin


Skin Exam: Dry, Intact, Normal Color, Warm





Assessment and Plan


(1) DVT prophylaxis


Status: Chronic   





(2) Scrotal edema


Status: Chronic   





(3) CAD (coronary artery disease)


Status: Chronic   





(4) Smoking


Status: Chronic   





(5) Low back pain


Status: Chronic   





(6) Prediabetes


Status: Chronic   





(7) Neurocognitive disorder


Status: Chronic

## 2018-03-09 RX ADMIN — DIVALPROEX SODIUM SCH MG: 250 TABLET, DELAYED RELEASE ORAL at 09:30

## 2018-03-09 RX ADMIN — DIVALPROEX SODIUM SCH MG: 250 TABLET, DELAYED RELEASE ORAL at 17:04

## 2018-03-09 NOTE — CP.PCM.PN
Subjective





- Date & Time of Evaluation


Date of Evaluation: 03/09/18


Time of Evaluation: 07:40





- Subjective


Subjective: 





s/o no f/c, n/v/d. no other complaints.calm and cooperative. no abd pain, 


a/p cont plan, cont placement, cont all medical management, cont psych meds


remains unable to make medical decisians for self. pending state disability/

guardianship/placement





Objective





- Vital Signs/Intake and Output


Vital Signs (last 24 hours): 


 











Temp Pulse Resp BP Pulse Ox


 


 97.8 F   79   19   102/69   97 


 


 03/09/18 07:37  03/09/18 07:37  03/09/18 07:37  03/09/18 07:37  03/09/18 07:37











- Medications


Medications: 


 Current Medications





Atorvastatin Calcium (Lipitor)  20 mg PO HS [facility]


   Last Admin: 03/08/18 21:30 Dose:  20 mg


Divalproex Sodium (Depakote Dr(*Bid*))  500 mg PO BID [facility]


   Last Admin: 03/08/18 17:22 Dose:  500 mg


Enalapril Maleate (Vasotec)  10 mg PO DAILY [facility]


   Last Admin: 03/08/18 09:04 Dose:  10 mg


Fenofibrate (Tricor)  145 mg PO DAILY [facility]


   Last Admin: 03/08/18 09:04 Dose:  145 mg


Metoprolol Tartrate (Lopressor)  50 mg PO Q12 [facility]


   Last Admin: 03/08/18 21:30 Dose:  50 mg


Quetiapine Fumarate (Seroquel)  50 mg PO Q12 [facility]


   Last Admin: 03/08/18 22:00 Dose:  50 mg











- Labs


Labs: 


 





 12/19/17 05:45 





 12/19/17 05:45 





 











PT  11.2 SECONDS (9.4-12.0)   12/14/15  05:20    


 


INR  1.05  (0.89-1.20)   12/14/15  05:20    


 


APTT  36.2 SECONDS (23.1-32.0)  H  12/14/15  05:20    














- Constitutional


Appears: Well, Non-toxic, No Acute Distress





- Head Exam


Head Exam: ATRAUMATIC, NORMAL INSPECTION, NORMOCEPHALIC





- Eye Exam


Eye Exam: EOMI, Normal appearance, PERRL


Pupil Exam: NORMAL ACCOMODATION, PERRL





- ENT Exam


ENT Exam: Mucous Membranes Moist, Normal Exam





- Neck Exam


Neck Exam: Full ROM, Normal Inspection.  absent: Lymphadenopathy





- Respiratory Exam


Respiratory Exam: Clear to Ausculation Bilateral, NORMAL BREATHING PATTERN





- Cardiovascular Exam


Cardiovascular Exam: REGULAR RHYTHM, RRR, +S1, +S2.  absent: Murmur





- GI/Abdominal Exam


GI & Abdominal Exam: Soft, Normal Bowel Sounds.  absent: Tenderness





- Extremities Exam


Extremities Exam: Full ROM, Normal Capillary Refill, Normal Inspection.  absent

: Joint Swelling, Pedal Edema





- Back Exam


Back Exam: NORMAL INSPECTION





- Neurological Exam


Neurological Exam: Alert, Awake, CN II-XII Intact, Normal Gait, Oriented x3





- Psychiatric Exam


Psychiatric exam: Normal Affect, Normal Mood





- Skin


Skin Exam: Dry, Intact, Normal Color, Warm





Assessment and Plan


(1) DVT prophylaxis


Status: Chronic   





(2) Scrotal edema


Status: Chronic   





(3) CAD (coronary artery disease)


Status: Chronic   





(4) Smoking


Status: Chronic   





(5) Low back pain


Status: Chronic   





(6) Prediabetes


Status: Chronic   





(7) Neurocognitive disorder


Status: Chronic

## 2018-03-10 RX ADMIN — DIVALPROEX SODIUM SCH MG: 250 TABLET, DELAYED RELEASE ORAL at 08:16

## 2018-03-10 RX ADMIN — DIVALPROEX SODIUM SCH MG: 250 TABLET, DELAYED RELEASE ORAL at 16:20

## 2018-03-10 NOTE — CP.PCM.PN
Subjective





- Date & Time of Evaluation


Date of Evaluation: 03/10/18


Time of Evaluation: 09:35





- Subjective


Subjective: 





s/o no f/c, n/v/d. no other complaints.calm and cooperative. no abd pain, 


a/p cont plan, cont placement, cont all medical management, cont psych meds


remains unable to make medical decisians for self. pending state disability/

guardianship/placement








Objective





- Vital Signs/Intake and Output


Vital Signs (last 24 hours): 


 











Temp Pulse Resp BP Pulse Ox


 


 97.6 F   64   20   107/72   100 


 


 03/10/18 08:28  03/10/18 08:28  03/10/18 08:28  03/10/18 08:28  03/10/18 08:28











- Medications


Medications: 


 Current Medications





Atorvastatin Calcium (Lipitor)  20 mg PO HS [facility]


   Last Admin: 03/09/18 21:00 Dose:  20 mg


Divalproex Sodium (Depakote Dr(*Bid*))  500 mg PO BID [facility]


   Last Admin: 03/10/18 08:16 Dose:  500 mg


Enalapril Maleate (Vasotec)  10 mg PO DAILY [facility]


   Last Admin: 03/10/18 08:17 Dose:  10 mg


Fenofibrate (Tricor)  145 mg PO DAILY [facility]


   Last Admin: 03/10/18 08:17 Dose:  145 mg


Metoprolol Tartrate (Lopressor)  50 mg PO Q12 [facility]


   Last Admin: 03/10/18 08:17 Dose:  50 mg


Quetiapine Fumarate (Seroquel)  50 mg PO Q12 [facility]


   Last Admin: 03/10/18 08:17 Dose:  50 mg











- Labs


Labs: 


 





 12/19/17 05:45 





 12/19/17 05:45 





 











PT  11.2 SECONDS (9.4-12.0)   12/14/15  05:20    


 


INR  1.05  (0.89-1.20)   12/14/15  05:20    


 


APTT  36.2 SECONDS (23.1-32.0)  H  12/14/15  05:20    














- Constitutional


Appears: Well, Non-toxic, No Acute Distress





- Head Exam


Head Exam: ATRAUMATIC, NORMAL INSPECTION, NORMOCEPHALIC





- Eye Exam


Eye Exam: EOMI, Normal appearance, PERRL


Pupil Exam: NORMAL ACCOMODATION, PERRL





- ENT Exam


ENT Exam: Mucous Membranes Moist, Normal Exam





- Neck Exam


Neck Exam: Full ROM, Normal Inspection.  absent: Lymphadenopathy





- Respiratory Exam


Respiratory Exam: Clear to Ausculation Bilateral, NORMAL BREATHING PATTERN





- Cardiovascular Exam


Cardiovascular Exam: REGULAR RHYTHM, RRR, +S1, +S2.  absent: Murmur





- GI/Abdominal Exam


GI & Abdominal Exam: Soft, Normal Bowel Sounds.  absent: Tenderness





- Extremities Exam


Extremities Exam: Full ROM, Normal Capillary Refill, Normal Inspection.  absent

: Joint Swelling, Pedal Edema





- Back Exam


Back Exam: NORMAL INSPECTION





- Neurological Exam


Neurological Exam: Alert, Awake, CN II-XII Intact, Normal Gait, Oriented x3





- Psychiatric Exam


Psychiatric exam: Normal Affect, Normal Mood





- Skin


Skin Exam: Dry, Intact, Normal Color, Warm





Assessment and Plan


(1) DVT prophylaxis


Status: Chronic   





(2) Scrotal edema


Status: Chronic   





(3) CAD (coronary artery disease)


Status: Chronic   





(4) Smoking


Status: Chronic   





(5) Low back pain


Status: Chronic   





(6) Prediabetes


Status: Chronic   





(7) Neurocognitive disorder


Status: Chronic

## 2018-03-11 RX ADMIN — DIVALPROEX SODIUM SCH MG: 250 TABLET, DELAYED RELEASE ORAL at 08:15

## 2018-03-11 RX ADMIN — DIVALPROEX SODIUM SCH MG: 250 TABLET, DELAYED RELEASE ORAL at 16:04

## 2018-03-11 NOTE — CP.PCM.PN
Subjective





- Date & Time of Evaluation


Date of Evaluation: 03/11/18


Time of Evaluation: 10:46





- Subjective


Subjective: 





s/o no f/c, n/v/d. no other complaints.calm and cooperative. no abd pain, 


a/p cont plan, cont placement, cont all medical management, cont psych meds


remains unable to make medical decisians for self. pending state disability/

guardianship/placement





Objective





- Vital Signs/Intake and Output


Vital Signs (last 24 hours): 


 











Temp Pulse Resp BP Pulse Ox


 


 97.7 F   66   19   107/73   98 


 


 03/11/18 07:43  03/11/18 08:16  03/11/18 07:43  03/11/18 08:16  03/11/18 07:43











- Medications


Medications: 


 Current Medications





Atorvastatin Calcium (Lipitor)  20 mg PO HS [facility]


   Last Admin: 03/10/18 21:27 Dose:  20 mg


Divalproex Sodium (Depakote Dr(*Bid*))  500 mg PO BID [facility]


   Last Admin: 03/11/18 08:15 Dose:  500 mg


Enalapril Maleate (Vasotec)  10 mg PO DAILY [facility]


   Last Admin: 03/11/18 08:16 Dose:  10 mg


Fenofibrate (Tricor)  145 mg PO DAILY [facility]


   Last Admin: 03/11/18 08:15 Dose:  145 mg


Metoprolol Tartrate (Lopressor)  50 mg PO Q12 [facility]


   Last Admin: 03/11/18 08:16 Dose:  50 mg


Quetiapine Fumarate (Seroquel)  50 mg PO Q12 [facility]


   Last Admin: 03/11/18 08:16 Dose:  50 mg











- Labs


Labs: 


 





 12/19/17 05:45 





 12/19/17 05:45 





 











PT  11.2 SECONDS (9.4-12.0)   12/14/15  05:20    


 


INR  1.05  (0.89-1.20)   12/14/15  05:20    


 


APTT  36.2 SECONDS (23.1-32.0)  H  12/14/15  05:20    














- Constitutional


Appears: Well, Non-toxic, No Acute Distress





- Head Exam


Head Exam: ATRAUMATIC, NORMAL INSPECTION, NORMOCEPHALIC





- Eye Exam


Eye Exam: EOMI, Normal appearance, PERRL


Pupil Exam: NORMAL ACCOMODATION, PERRL





- ENT Exam


ENT Exam: Mucous Membranes Moist, Normal Exam





- Neck Exam


Neck Exam: Full ROM, Normal Inspection.  absent: Lymphadenopathy





- Respiratory Exam


Respiratory Exam: Clear to Ausculation Bilateral, NORMAL BREATHING PATTERN





- Cardiovascular Exam


Cardiovascular Exam: REGULAR RHYTHM, RRR, +S1, +S2.  absent: Murmur





- GI/Abdominal Exam


GI & Abdominal Exam: Soft, Normal Bowel Sounds.  absent: Tenderness





- Extremities Exam


Extremities Exam: Full ROM, Normal Capillary Refill, Normal Inspection.  absent

: Joint Swelling, Pedal Edema





- Back Exam


Back Exam: NORMAL INSPECTION





- Neurological Exam


Neurological Exam: Alert, Awake, CN II-XII Intact, Normal Gait, Oriented x3





- Psychiatric Exam


Psychiatric exam: Normal Affect, Normal Mood





- Skin


Skin Exam: Dry, Intact, Normal Color, Warm





Assessment and Plan


(1) DVT prophylaxis


Status: Chronic   





(2) Scrotal edema


Status: Chronic   





(3) CAD (coronary artery disease)


Status: Chronic   





(4) Smoking


Status: Chronic   





(5) Low back pain


Status: Chronic   





(6) Prediabetes


Status: Chronic   





(7) Neurocognitive disorder


Status: Chronic

## 2018-03-12 RX ADMIN — DIVALPROEX SODIUM SCH MG: 250 TABLET, DELAYED RELEASE ORAL at 09:14

## 2018-03-12 RX ADMIN — DIVALPROEX SODIUM SCH MG: 250 TABLET, DELAYED RELEASE ORAL at 16:47

## 2018-03-12 NOTE — CP.PCM.PN
Subjective





- Date & Time of Evaluation


Date of Evaluation: 03/12/18


Time of Evaluation: 07:26





- Subjective


Subjective: 





s/o no f/c, n/v/d. no other complaints.calm and cooperative. no abd pain, 


a/p cont plan, cont placement, cont all medical management, cont psych meds


remains unable to make medical decisians for self. pending state disability/

guardianship/placement





Objective





- Vital Signs/Intake and Output


Vital Signs (last 24 hours): 


 











Temp Pulse Resp BP Pulse Ox


 


 98.0 F   77   18   100/68   95 


 


 03/11/18 23:26  03/11/18 23:26  03/11/18 23:26  03/11/18 23:26  03/11/18 23:26











- Medications


Medications: 


 Current Medications





Atorvastatin Calcium (Lipitor)  20 mg PO HS [facility]


   Last Admin: 03/11/18 21:08 Dose:  20 mg


Divalproex Sodium (Depakote Dr(*Bid*))  500 mg PO BID [facility]


   Last Admin: 03/11/18 16:04 Dose:  500 mg


Enalapril Maleate (Vasotec)  10 mg PO DAILY [facility]


   Last Admin: 03/11/18 08:16 Dose:  10 mg


Fenofibrate (Tricor)  145 mg PO DAILY [facility]


   Last Admin: 03/11/18 08:15 Dose:  145 mg


Metoprolol Tartrate (Lopressor)  50 mg PO Q12 [facility]


   Last Admin: 03/11/18 21:08 Dose:  50 mg


Quetiapine Fumarate (Seroquel)  50 mg PO Q12 [facility]


   Last Admin: 03/11/18 21:08 Dose:  50 mg











- Labs


Labs: 


 





 12/19/17 05:45 





 12/19/17 05:45 





 











PT  11.2 SECONDS (9.4-12.0)   12/14/15  05:20    


 


INR  1.05  (0.89-1.20)   12/14/15  05:20    


 


APTT  36.2 SECONDS (23.1-32.0)  H  12/14/15  05:20    














- Constitutional


Appears: Well, Non-toxic, No Acute Distress





- Head Exam


Head Exam: ATRAUMATIC, NORMAL INSPECTION, NORMOCEPHALIC





- Eye Exam


Eye Exam: EOMI, Normal appearance, PERRL


Pupil Exam: NORMAL ACCOMODATION, PERRL





- ENT Exam


ENT Exam: Mucous Membranes Moist, Normal Exam





- Neck Exam


Neck Exam: Full ROM, Normal Inspection.  absent: Lymphadenopathy





- Respiratory Exam


Respiratory Exam: Clear to Ausculation Bilateral, NORMAL BREATHING PATTERN





- Cardiovascular Exam


Cardiovascular Exam: REGULAR RHYTHM, RRR, +S1, +S2.  absent: Murmur





- GI/Abdominal Exam


GI & Abdominal Exam: Soft, Normal Bowel Sounds.  absent: Tenderness





- Extremities Exam


Extremities Exam: Full ROM, Normal Capillary Refill, Normal Inspection.  absent

: Joint Swelling, Pedal Edema





- Back Exam


Back Exam: NORMAL INSPECTION





- Neurological Exam


Neurological Exam: Alert, Awake, CN II-XII Intact, Normal Gait, Oriented x3





- Psychiatric Exam


Psychiatric exam: Normal Affect, Normal Mood





- Skin


Skin Exam: Dry, Intact, Normal Color, Warm





Assessment and Plan


(1) DVT prophylaxis


Status: Chronic   





(2) Scrotal edema


Status: Chronic   





(3) CAD (coronary artery disease)


Status: Chronic   





(4) Smoking


Status: Chronic   





(5) Low back pain


Status: Chronic   





(6) Prediabetes


Status: Chronic   





(7) Neurocognitive disorder


Status: Chronic

## 2018-03-13 RX ADMIN — DIVALPROEX SODIUM SCH MG: 250 TABLET, DELAYED RELEASE ORAL at 17:00

## 2018-03-13 RX ADMIN — DIVALPROEX SODIUM SCH MG: 250 TABLET, DELAYED RELEASE ORAL at 09:09

## 2018-03-13 NOTE — CP.PCM.PN
Subjective





- Date & Time of Evaluation


Date of Evaluation: 03/13/18


Time of Evaluation: 08:25





- Subjective


Subjective: 





s/o no f/c, n/v/d. no other complaints.calm and cooperative. no abd pain, 


a/p cont plan, cont placement, cont all medical management, cont psych meds


remains unable to make medical decisians for self. pending state disability/

guardianship/placement





Objective





- Vital Signs/Intake and Output


Vital Signs (last 24 hours): 


 











Temp Pulse Resp BP Pulse Ox


 


 97.7 F   79   18   98/56 L  97 


 


 03/13/18 08:08  03/13/18 08:08  03/13/18 08:08  03/13/18 08:08  03/13/18 08:08











- Medications


Medications: 


 Current Medications





Atorvastatin Calcium (Lipitor)  20 mg PO HS [facility]


   Last Admin: 03/12/18 21:18 Dose:  20 mg


Divalproex Sodium (Depakote Dr(*Bid*))  500 mg PO BID [facility]


   Last Admin: 03/12/18 16:47 Dose:  500 mg


Enalapril Maleate (Vasotec)  10 mg PO DAILY [facility]


   Last Admin: 03/12/18 09:15 Dose:  Not Given


Fenofibrate (Tricor)  145 mg PO DAILY [facility]


   Last Admin: 03/12/18 09:15 Dose:  145 mg


Metoprolol Tartrate (Lopressor)  50 mg PO Q12 [facility]


   Last Admin: 03/12/18 21:19 Dose:  50 mg


Quetiapine Fumarate (Seroquel)  50 mg PO Q12 [facility]


   Last Admin: 03/12/18 21:18 Dose:  50 mg











- Labs


Labs: 


 





 12/19/17 05:45 





 12/19/17 05:45 





 











PT  11.2 SECONDS (9.4-12.0)   12/14/15  05:20    


 


INR  1.05  (0.89-1.20)   12/14/15  05:20    


 


APTT  36.2 SECONDS (23.1-32.0)  H  12/14/15  05:20    














- Constitutional


Appears: Well, Non-toxic, No Acute Distress





- Head Exam


Head Exam: ATRAUMATIC, NORMAL INSPECTION, NORMOCEPHALIC





- Eye Exam


Eye Exam: EOMI, Normal appearance, PERRL


Pupil Exam: NORMAL ACCOMODATION, PERRL





- ENT Exam


ENT Exam: Mucous Membranes Moist, Normal Exam





- Neck Exam


Neck Exam: Full ROM, Normal Inspection.  absent: Lymphadenopathy





- Respiratory Exam


Respiratory Exam: Clear to Ausculation Bilateral, NORMAL BREATHING PATTERN





- Cardiovascular Exam


Cardiovascular Exam: REGULAR RHYTHM, +S1, +S2.  absent: Murmur





- GI/Abdominal Exam


GI & Abdominal Exam: Soft, Normal Bowel Sounds.  absent: Tenderness





- Extremities Exam


Extremities Exam: Full ROM, Normal Capillary Refill, Normal Inspection.  absent

: Joint Swelling, Pedal Edema





- Back Exam


Back Exam: NORMAL INSPECTION





- Neurological Exam


Neurological Exam: Alert, Awake, CN II-XII Intact, Normal Gait, Oriented x3





- Psychiatric Exam


Psychiatric exam: Normal Affect, Normal Mood





- Skin


Skin Exam: Dry, Intact, Normal Color, Warm





Assessment and Plan


(1) DVT prophylaxis


Status: Chronic   





(2) Scrotal edema


Status: Chronic   





(3) CAD (coronary artery disease)


Status: Chronic   





(4) Smoking


Status: Chronic   





(5) Low back pain


Status: Chronic   





(6) Prediabetes


Status: Chronic   





(7) Neurocognitive disorder


Status: Chronic

## 2018-03-14 RX ADMIN — DIVALPROEX SODIUM SCH MG: 250 TABLET, DELAYED RELEASE ORAL at 08:30

## 2018-03-14 RX ADMIN — DIVALPROEX SODIUM SCH MG: 250 TABLET, DELAYED RELEASE ORAL at 16:35

## 2018-03-14 NOTE — CP.PCM.PN
Subjective





- Date & Time of Evaluation


Date of Evaluation: 03/14/18


Time of Evaluation: 08:03





- Subjective


Subjective: 





s/o no f/c, n/v/d. no other complaints.calm and cooperative. no abd pain, 


a/p cont plan, cont placement, cont all medical management, cont psych meds


remains unable to make medical decisians for self. pending state disability/

guardianship/placement





Objective





- Vital Signs/Intake and Output


Vital Signs (last 24 hours): 


 











Temp Pulse Resp BP Pulse Ox


 


 98.2 F   67   20   94/55 L  96 


 


 03/14/18 07:42  03/14/18 07:42  03/14/18 07:42  03/14/18 07:42  03/14/18 07:42











- Medications


Medications: 


 Current Medications





Atorvastatin Calcium (Lipitor)  20 mg PO HS [facility]


   Last Admin: 03/13/18 21:16 Dose:  20 mg


Divalproex Sodium (Depakote Dr(*Bid*))  500 mg PO BID [facility]


   Last Admin: 03/13/18 17:00 Dose:  500 mg


Enalapril Maleate (Vasotec)  10 mg PO DAILY [facility]


   Last Admin: 03/13/18 09:09 Dose:  10 mg


Fenofibrate (Tricor)  145 mg PO DAILY [facility]


   Last Admin: 03/13/18 09:08 Dose:  145 mg


Metoprolol Tartrate (Lopressor)  50 mg PO Q12 [facility]


   Last Admin: 03/13/18 21:13 Dose:  50 mg


Quetiapine Fumarate (Seroquel)  50 mg PO Q12 [facility]


   Last Admin: 03/13/18 21:16 Dose:  50 mg











- Labs


Labs: 


 





 12/19/17 05:45 





 12/19/17 05:45 





 











PT  11.2 SECONDS (9.4-12.0)   12/14/15  05:20    


 


INR  1.05  (0.89-1.20)   12/14/15  05:20    


 


APTT  36.2 SECONDS (23.1-32.0)  H  12/14/15  05:20    














- Constitutional


Appears: Well, Non-toxic, No Acute Distress





- Head Exam


Head Exam: ATRAUMATIC, NORMAL INSPECTION, NORMOCEPHALIC





- Eye Exam


Eye Exam: EOMI, Normal appearance, PERRL


Pupil Exam: NORMAL ACCOMODATION, PERRL





- ENT Exam


ENT Exam: Mucous Membranes Moist, Normal Exam





- Neck Exam


Neck Exam: Full ROM, Normal Inspection.  absent: Lymphadenopathy





- Respiratory Exam


Respiratory Exam: Clear to Ausculation Bilateral, NORMAL BREATHING PATTERN





- Cardiovascular Exam


Cardiovascular Exam: REGULAR RHYTHM, RRR, +S1, +S2.  absent: Murmur





- GI/Abdominal Exam


GI & Abdominal Exam: Soft, Normal Bowel Sounds.  absent: Tenderness





- Extremities Exam


Extremities Exam: Full ROM, Normal Capillary Refill, Normal Inspection.  absent

: Joint Swelling, Pedal Edema





- Back Exam


Back Exam: NORMAL INSPECTION





- Neurological Exam


Neurological Exam: Alert, Awake, CN II-XII Intact, Normal Gait, Oriented x3





- Psychiatric Exam


Psychiatric exam: Normal Affect, Normal Mood





- Skin


Skin Exam: Dry, Intact, Normal Color, Warm





Assessment and Plan


(1) DVT prophylaxis


Status: Chronic   





(2) Scrotal edema


Status: Chronic   





(3) CAD (coronary artery disease)


Status: Chronic   





(4) Smoking


Status: Chronic   





(5) Low back pain


Status: Chronic   





(6) Prediabetes


Status: Chronic   





(7) Neurocognitive disorder


Status: Chronic

## 2018-03-15 RX ADMIN — DIVALPROEX SODIUM SCH MG: 250 TABLET, DELAYED RELEASE ORAL at 09:25

## 2018-03-15 RX ADMIN — DIVALPROEX SODIUM SCH MG: 250 TABLET, DELAYED RELEASE ORAL at 17:05

## 2018-03-15 NOTE — CP.PCM.PN
Subjective





- Date & Time of Evaluation


Date of Evaluation: 03/15/18


Time of Evaluation: 08:13





- Subjective


Subjective: 





s/o no f/c, n/v/d. no other complaints.calm and cooperative. no abd pain, 


a/p cont plan, cont placement, cont all medical management, cont psych meds


remains unable to make medical decisians for self. pending state disability/

guardianship/placement








Objective





- Vital Signs/Intake and Output


Vital Signs (last 24 hours): 


 











Temp Pulse Resp BP Pulse Ox


 


 98 F   86   20   90/59 L  99 


 


 03/15/18 07:41  03/15/18 07:41  03/15/18 07:41  03/15/18 07:41  03/15/18 07:41











- Medications


Medications: 


 Current Medications





Atorvastatin Calcium (Lipitor)  20 mg PO HS [facility]


   Last Admin: 03/14/18 21:19 Dose:  20 mg


Divalproex Sodium (Depakote Dr(*Bid*))  500 mg PO BID [facility]


   Last Admin: 03/14/18 16:35 Dose:  500 mg


Enalapril Maleate (Vasotec)  10 mg PO DAILY [facility]


   Last Admin: 03/14/18 08:30 Dose:  10 mg


Fenofibrate (Tricor)  145 mg PO DAILY [facility]


   Last Admin: 03/14/18 08:30 Dose:  145 mg


Metoprolol Tartrate (Lopressor)  50 mg PO Q12 [facility]


   Last Admin: 03/14/18 21:14 Dose:  50 mg


Quetiapine Fumarate (Seroquel)  50 mg PO Q12 [facility]


   Last Admin: 03/14/18 21:19 Dose:  50 mg











- Labs


Labs: 


 





 12/19/17 05:45 





 12/19/17 05:45 





 











PT  11.2 SECONDS (9.4-12.0)   12/14/15  05:20    


 


INR  1.05  (0.89-1.20)   12/14/15  05:20    


 


APTT  36.2 SECONDS (23.1-32.0)  H  12/14/15  05:20    














- Constitutional


Appears: Well, Non-toxic, No Acute Distress





- Head Exam


Head Exam: ATRAUMATIC, NORMAL INSPECTION, NORMOCEPHALIC





- Eye Exam


Eye Exam: EOMI, Normal appearance, PERRL


Pupil Exam: NORMAL ACCOMODATION, PERRL





- ENT Exam


ENT Exam: Mucous Membranes Moist, Normal Exam





- Neck Exam


Neck Exam: Full ROM, Normal Inspection.  absent: Lymphadenopathy





- Respiratory Exam


Respiratory Exam: Clear to Ausculation Bilateral, NORMAL BREATHING PATTERN





- Cardiovascular Exam


Cardiovascular Exam: REGULAR RHYTHM, RRR, +S1, +S2.  absent: Murmur





- GI/Abdominal Exam


GI & Abdominal Exam: Soft, Normal Bowel Sounds.  absent: Tenderness





- Extremities Exam


Extremities Exam: Full ROM, Normal Capillary Refill, Normal Inspection.  absent

: Joint Swelling, Pedal Edema





- Back Exam


Back Exam: NORMAL INSPECTION





- Neurological Exam


Neurological Exam: Alert, Awake, CN II-XII Intact, Normal Gait, Oriented x3





- Psychiatric Exam


Psychiatric exam: Normal Affect, Normal Mood





- Skin


Skin Exam: Dry, Intact, Normal Color, Warm





Assessment and Plan


(1) DVT prophylaxis


Status: Chronic   





(2) Scrotal edema


Status: Chronic   





(3) CAD (coronary artery disease)


Status: Chronic   





(4) Smoking


Status: Chronic   





(5) Low back pain


Status: Chronic   





(6) Prediabetes


Status: Chronic   





(7) Neurocognitive disorder


Status: Chronic

## 2018-03-16 RX ADMIN — DIVALPROEX SODIUM SCH MG: 250 TABLET, DELAYED RELEASE ORAL at 09:23

## 2018-03-16 RX ADMIN — DIVALPROEX SODIUM SCH MG: 250 TABLET, DELAYED RELEASE ORAL at 16:28

## 2018-03-16 NOTE — CP.PCM.PN
Subjective





- Date & Time of Evaluation


Date of Evaluation: 03/16/18


Time of Evaluation: 11:32





- Subjective


Subjective: 





s/o no f/c, n/v/d. no other complaints.calm and cooperative. no abd pain, 


a/p cont plan, cont placement, cont all medical management, cont psych meds


remains unable to make medical decisians for self. pending state disability/

guardianship/placement





Objective





- Vital Signs/Intake and Output


Vital Signs (last 24 hours): 


 











Temp Pulse Resp BP Pulse Ox


 


 98.1 F   74   20   106/65   95 


 


 03/16/18 07:48  03/16/18 09:24  03/16/18 07:48  03/16/18 09:24  03/16/18 07:48











- Medications


Medications: 


 Current Medications





Atorvastatin Calcium (Lipitor)  20 mg PO HS [facility]


   Last Admin: 03/15/18 21:29 Dose:  20 mg


Divalproex Sodium (Depakote Dr(*Bid*))  500 mg PO BID [facility]


   Last Admin: 03/16/18 09:23 Dose:  500 mg


Enalapril Maleate (Vasotec)  10 mg PO DAILY [facility]


   Last Admin: 03/16/18 09:24 Dose:  10 mg


Fenofibrate (Tricor)  145 mg PO DAILY [facility]


   Last Admin: 03/16/18 09:24 Dose:  145 mg


Metoprolol Tartrate (Lopressor)  50 mg PO Q12 [facility]


   Last Admin: 03/16/18 09:24 Dose:  50 mg


Quetiapine Fumarate (Seroquel)  50 mg PO Q12 [facility]


   Last Admin: 03/16/18 09:24 Dose:  50 mg











- Labs


Labs: 


 





 12/19/17 05:45 





 12/19/17 05:45 





 











PT  11.2 SECONDS (9.4-12.0)   12/14/15  05:20    


 


INR  1.05  (0.89-1.20)   12/14/15  05:20    


 


APTT  36.2 SECONDS (23.1-32.0)  H  12/14/15  05:20    














- Constitutional


Appears: Well, Non-toxic, No Acute Distress





- Head Exam


Head Exam: ATRAUMATIC, NORMAL INSPECTION, NORMOCEPHALIC





- Eye Exam


Eye Exam: EOMI, Normal appearance, PERRL


Pupil Exam: NORMAL ACCOMODATION, PERRL





- ENT Exam


ENT Exam: Mucous Membranes Moist, Normal Exam





- Neck Exam


Neck Exam: Full ROM, Normal Inspection.  absent: Lymphadenopathy





- Respiratory Exam


Respiratory Exam: Clear to Ausculation Bilateral, NORMAL BREATHING PATTERN





- Cardiovascular Exam


Cardiovascular Exam: REGULAR RHYTHM, RRR, +S1, +S2.  absent: Murmur





- GI/Abdominal Exam


GI & Abdominal Exam: Soft, Normal Bowel Sounds.  absent: Tenderness





- Extremities Exam


Extremities Exam: Full ROM, Normal Capillary Refill, Normal Inspection.  absent

: Joint Swelling, Pedal Edema





- Back Exam


Back Exam: NORMAL INSPECTION





- Neurological Exam


Neurological Exam: Alert, Awake, CN II-XII Intact, Normal Gait, Oriented x3





- Psychiatric Exam


Psychiatric exam: Normal Affect, Normal Mood





- Skin


Skin Exam: Dry, Intact, Normal Color, Warm





Assessment and Plan


(1) DVT prophylaxis


Status: Chronic   





(2) Scrotal edema


Status: Chronic   





(3) CAD (coronary artery disease)


Status: Chronic   





(4) Smoking


Status: Chronic   





(5) Low back pain


Status: Chronic   





(6) Prediabetes


Status: Chronic   





(7) Neurocognitive disorder


Status: Chronic

## 2018-03-17 RX ADMIN — DIVALPROEX SODIUM SCH MG: 250 TABLET, DELAYED RELEASE ORAL at 08:58

## 2018-03-17 RX ADMIN — DIVALPROEX SODIUM SCH MG: 250 TABLET, DELAYED RELEASE ORAL at 17:15

## 2018-03-17 NOTE — CP.PCM.PN
Subjective





- Date & Time of Evaluation


Date of Evaluation: 03/17/18


Time of Evaluation: 06:45





- Subjective


Subjective: 





s/o no f/c, n/v/d. no other complaints.calm and cooperative. no abd pain, 


a/p cont plan, cont placement, cont all medical management, cont psych meds


remains unable to make medical decisians for self. pending state disability/

guardianship/placement





Objective





- Vital Signs/Intake and Output


Vital Signs (last 24 hours): 


 











Temp Pulse Resp BP Pulse Ox


 


 98.0 F   67   20   106/60   96 


 


 03/16/18 23:35  03/16/18 23:35  03/16/18 23:35  03/16/18 23:35  03/16/18 23:35











- Medications


Medications: 


 Current Medications





Atorvastatin Calcium (Lipitor)  20 mg PO HS [facility]


   Last Admin: 03/16/18 21:16 Dose:  20 mg


Divalproex Sodium (Depakote Dr(*Bid*))  500 mg PO BID [facility]


   Last Admin: 03/16/18 16:28 Dose:  500 mg


Enalapril Maleate (Vasotec)  10 mg PO DAILY [facility]


   Last Admin: 03/16/18 09:24 Dose:  10 mg


Fenofibrate (Tricor)  145 mg PO DAILY [facility]


   Last Admin: 03/16/18 09:24 Dose:  145 mg


Metoprolol Tartrate (Lopressor)  50 mg PO Q12 [facility]


   Last Admin: 03/16/18 21:17 Dose:  50 mg


Quetiapine Fumarate (Seroquel)  50 mg PO Q12 [facility]


   Last Admin: 03/16/18 21:16 Dose:  50 mg











- Labs


Labs: 


 





 12/19/17 05:45 





 12/19/17 05:45 





 











PT  11.2 SECONDS (9.4-12.0)   12/14/15  05:20    


 


INR  1.05  (0.89-1.20)   12/14/15  05:20    


 


APTT  36.2 SECONDS (23.1-32.0)  H  12/14/15  05:20    














- Constitutional


Appears: Well, Non-toxic, No Acute Distress





- Head Exam


Head Exam: ATRAUMATIC, NORMAL INSPECTION, NORMOCEPHALIC





- Eye Exam


Eye Exam: EOMI, Normal appearance, PERRL


Pupil Exam: NORMAL ACCOMODATION, PERRL





- ENT Exam


ENT Exam: Mucous Membranes Moist, Normal Exam





- Neck Exam


Neck Exam: Full ROM, Normal Inspection.  absent: Lymphadenopathy





- Respiratory Exam


Respiratory Exam: Clear to Ausculation Bilateral, NORMAL BREATHING PATTERN





- Cardiovascular Exam


Cardiovascular Exam: REGULAR RHYTHM, RRR, +S1, +S2.  absent: Murmur





- GI/Abdominal Exam


GI & Abdominal Exam: Soft, Normal Bowel Sounds.  absent: Tenderness





- Extremities Exam


Extremities Exam: Full ROM, Normal Capillary Refill, Normal Inspection.  absent

: Joint Swelling, Pedal Edema





- Back Exam


Back Exam: NORMAL INSPECTION





- Neurological Exam


Neurological Exam: Alert, Awake, CN II-XII Intact, Normal Gait, Oriented x3





- Psychiatric Exam


Psychiatric exam: Normal Affect, Normal Mood





- Skin


Skin Exam: Dry, Intact, Normal Color, Warm





Assessment and Plan


(1) DVT prophylaxis


Status: Chronic   





(2) Scrotal edema


Status: Chronic   





(3) CAD (coronary artery disease)


Status: Chronic   





(4) Smoking


Status: Chronic   





(5) Low back pain


Status: Chronic   





(6) Prediabetes


Status: Chronic   





(7) Neurocognitive disorder


Status: Chronic

## 2018-03-18 RX ADMIN — DIVALPROEX SODIUM SCH MG: 250 TABLET, DELAYED RELEASE ORAL at 08:44

## 2018-03-18 RX ADMIN — DIVALPROEX SODIUM SCH MG: 250 TABLET, DELAYED RELEASE ORAL at 16:27

## 2018-03-18 NOTE — CP.PCM.PN
Subjective





- Date & Time of Evaluation


Date of Evaluation: 03/18/18


Time of Evaluation: 07:32





- Subjective


Subjective: 





s/o no f/c, n/v/d. no other complaints.calm and cooperative. no abd pain, 


a/p cont plan, cont placement, cont all medical management, cont psych meds


remains unable to make medical decisians for self. pending state disability/

guardianship/placement





Objective





- Vital Signs/Intake and Output


Vital Signs (last 24 hours): 


 











Temp Pulse Resp BP Pulse Ox


 


 97.7 F   76   18   98/65 L  95 


 


 03/18/18 00:11  03/18/18 00:11  03/18/18 00:11  03/18/18 00:11  03/18/18 00:11











- Medications


Medications: 


 Current Medications





Atorvastatin Calcium (Lipitor)  20 mg PO HS [facility]


   Last Admin: 03/17/18 21:14 Dose:  20 mg


Divalproex Sodium (Depakote Dr(*Bid*))  500 mg PO BID [facility]


   Last Admin: 03/17/18 17:15 Dose:  500 mg


Enalapril Maleate (Vasotec)  10 mg PO DAILY [facility]


   Last Admin: 03/17/18 09:01 Dose:  10 mg


Fenofibrate (Tricor)  145 mg PO DAILY [facility]


   Last Admin: 03/17/18 09:00 Dose:  145 mg


Metoprolol Tartrate (Lopressor)  50 mg PO Q12 [facility]


   Last Admin: 03/17/18 21:14 Dose:  50 mg


Quetiapine Fumarate (Seroquel)  50 mg PO Q12 [facility]


   Last Admin: 03/17/18 21:14 Dose:  50 mg











- Labs


Labs: 


 





 12/19/17 05:45 





 12/19/17 05:45 





 











PT  11.2 SECONDS (9.4-12.0)   12/14/15  05:20    


 


INR  1.05  (0.89-1.20)   12/14/15  05:20    


 


APTT  36.2 SECONDS (23.1-32.0)  H  12/14/15  05:20    














- Constitutional


Appears: Well, Non-toxic, No Acute Distress





- Head Exam


Head Exam: ATRAUMATIC, NORMAL INSPECTION, NORMOCEPHALIC





- Eye Exam


Eye Exam: EOMI, Normal appearance, PERRL


Pupil Exam: NORMAL ACCOMODATION, PERRL





- ENT Exam


ENT Exam: Mucous Membranes Moist, Normal Exam





- Neck Exam


Neck Exam: Full ROM, Normal Inspection.  absent: Lymphadenopathy





- Respiratory Exam


Respiratory Exam: Clear to Ausculation Bilateral, NORMAL BREATHING PATTERN





- Cardiovascular Exam


Cardiovascular Exam: REGULAR RHYTHM, RRR, +S1, +S2.  absent: Murmur





- GI/Abdominal Exam


GI & Abdominal Exam: Soft, Normal Bowel Sounds.  absent: Tenderness





- Extremities Exam


Extremities Exam: Full ROM, Normal Capillary Refill, Normal Inspection.  absent

: Joint Swelling, Pedal Edema





- Back Exam


Back Exam: NORMAL INSPECTION





- Neurological Exam


Neurological Exam: Alert, Awake, CN II-XII Intact, Normal Gait, Oriented x3





- Psychiatric Exam


Psychiatric exam: Normal Affect, Normal Mood





- Skin


Skin Exam: Dry, Intact, Normal Color, Warm





Assessment and Plan


(1) DVT prophylaxis


Status: Chronic   





(2) Scrotal edema


Status: Chronic   





(3) CAD (coronary artery disease)


Status: Chronic   





(4) Smoking


Status: Chronic   





(5) Low back pain


Status: Chronic   





(6) Prediabetes


Status: Chronic   





(7) Neurocognitive disorder


Status: Chronic

## 2018-03-19 RX ADMIN — DIVALPROEX SODIUM SCH MG: 250 TABLET, DELAYED RELEASE ORAL at 16:48

## 2018-03-19 RX ADMIN — DIVALPROEX SODIUM SCH MG: 250 TABLET, DELAYED RELEASE ORAL at 08:27

## 2018-03-19 NOTE — CP.PCM.PN
Subjective





- Date & Time of Evaluation


Date of Evaluation: 03/19/18


Time of Evaluation: 08:15





- Subjective


Subjective: 





s/o no f/c, n/v/d. no other complaints.calm and cooperative. no abd pain, 


a/p cont plan, cont placement, cont all medical management, cont psych meds


remains unable to make medical decisians for self. pending state disability/

guardianship/placement





Objective





- Vital Signs/Intake and Output


Vital Signs (last 24 hours): 


 











Temp Pulse Resp BP Pulse Ox


 


 98.1 F   73   19   98/67 L  96 


 


 03/19/18 00:00  03/19/18 00:00  03/19/18 00:00  03/19/18 00:00  03/19/18 00:00











- Medications


Medications: 


 Current Medications





Atorvastatin Calcium (Lipitor)  20 mg PO HS [facility]


   Last Admin: 03/18/18 21:09 Dose:  20 mg


Divalproex Sodium (Depakote Dr(*Bid*))  500 mg PO BID [facility]


   Last Admin: 03/18/18 16:27 Dose:  500 mg


Enalapril Maleate (Vasotec)  10 mg PO DAILY [facility]


   Last Admin: 03/18/18 08:45 Dose:  10 mg


Fenofibrate (Tricor)  145 mg PO DAILY [facility]


   Last Admin: 03/18/18 08:45 Dose:  145 mg


Metoprolol Tartrate (Lopressor)  50 mg PO Q12 [facility]


   Last Admin: 03/18/18 21:09 Dose:  50 mg


Quetiapine Fumarate (Seroquel)  50 mg PO Q12 [facility]


   Last Admin: 03/18/18 21:09 Dose:  50 mg











- Labs


Labs: 


 





 12/19/17 05:45 





 12/19/17 05:45 





 











PT  11.2 SECONDS (9.4-12.0)   12/14/15  05:20    


 


INR  1.05  (0.89-1.20)   12/14/15  05:20    


 


APTT  36.2 SECONDS (23.1-32.0)  H  12/14/15  05:20    














- Constitutional


Appears: Well, Non-toxic, No Acute Distress





- Head Exam


Head Exam: ATRAUMATIC, NORMAL INSPECTION, NORMOCEPHALIC





- Eye Exam


Eye Exam: EOMI, Normal appearance, PERRL


Pupil Exam: NORMAL ACCOMODATION, PERRL





- ENT Exam


ENT Exam: Mucous Membranes Moist, Normal Exam





- Neck Exam


Neck Exam: Full ROM, Normal Inspection.  absent: Lymphadenopathy





- Respiratory Exam


Respiratory Exam: Clear to Ausculation Bilateral, NORMAL BREATHING PATTERN





- Cardiovascular Exam


Cardiovascular Exam: REGULAR RHYTHM, RRR, +S1, +S2.  absent: Murmur





- GI/Abdominal Exam


GI & Abdominal Exam: Soft, Normal Bowel Sounds.  absent: Tenderness





- Extremities Exam


Extremities Exam: Full ROM, Normal Capillary Refill, Normal Inspection.  absent

: Joint Swelling, Pedal Edema





- Back Exam


Back Exam: NORMAL INSPECTION





- Neurological Exam


Neurological Exam: Alert, Awake, CN II-XII Intact, Normal Gait, Oriented x3





- Psychiatric Exam


Psychiatric exam: Normal Affect, Normal Mood





- Skin


Skin Exam: Dry, Intact, Normal Color, Warm





Assessment and Plan


(1) DVT prophylaxis


Status: Chronic   





(2) Scrotal edema


Status: Chronic   





(3) CAD (coronary artery disease)


Status: Chronic   





(4) Smoking


Status: Chronic   





(5) Low back pain


Status: Chronic   





(6) Prediabetes


Status: Chronic   





(7) Neurocognitive disorder


Status: Chronic

## 2018-03-20 RX ADMIN — DIVALPROEX SODIUM SCH MG: 250 TABLET, DELAYED RELEASE ORAL at 17:00

## 2018-03-20 RX ADMIN — DIVALPROEX SODIUM SCH MG: 250 TABLET, DELAYED RELEASE ORAL at 10:57

## 2018-03-20 NOTE — CP.PCM.PN
Subjective





- Date & Time of Evaluation


Date of Evaluation: 03/20/18


Time of Evaluation: 07:59





- Subjective


Subjective: 





s/o no f/c, n/v/d. no other complaints.calm and cooperative. no abd pain, 


a/p cont plan, cont placement, cont all medical management, cont psych meds


remains unable to make medical decisians for self. pending state disability/

guardianship/placement





Objective





- Vital Signs/Intake and Output


Vital Signs (last 24 hours): 


 











Temp Pulse Resp BP Pulse Ox


 


 98.4 F   74   19   109/77   97 


 


 03/20/18 07:40  03/20/18 07:40  03/20/18 07:40  03/20/18 07:40  03/20/18 07:40











- Medications


Medications: 


 Current Medications





Atorvastatin Calcium (Lipitor)  20 mg PO HS [facility]


   Last Admin: 03/19/18 21:11 Dose:  20 mg


Divalproex Sodium (Depakote Dr(*Bid*))  500 mg PO BID [facility]


   Last Admin: 03/19/18 16:48 Dose:  500 mg


Enalapril Maleate (Vasotec)  10 mg PO DAILY [facility]


   Last Admin: 03/19/18 08:28 Dose:  Not Given


Fenofibrate (Tricor)  145 mg PO DAILY [facility]


   Last Admin: 03/19/18 08:28 Dose:  145 mg


Metoprolol Tartrate (Lopressor)  50 mg PO Q12 [facility]


   Last Admin: 03/19/18 21:12 Dose:  50 mg


Quetiapine Fumarate (Seroquel)  50 mg PO Q12 [facility]


   Last Admin: 03/19/18 21:11 Dose:  50 mg











- Labs


Labs: 


 





 12/19/17 05:45 





 12/19/17 05:45 





 











PT  11.2 SECONDS (9.4-12.0)   12/14/15  05:20    


 


INR  1.05  (0.89-1.20)   12/14/15  05:20    


 


APTT  36.2 SECONDS (23.1-32.0)  H  12/14/15  05:20    














- Constitutional


Appears: Well, Non-toxic, No Acute Distress





- Head Exam


Head Exam: ATRAUMATIC, NORMAL INSPECTION, NORMOCEPHALIC





- Eye Exam


Eye Exam: EOMI, Normal appearance, PERRL


Pupil Exam: NORMAL ACCOMODATION, PERRL





- ENT Exam


ENT Exam: Mucous Membranes Moist, Normal Exam





- Neck Exam


Neck Exam: Full ROM, Normal Inspection.  absent: Lymphadenopathy





- Respiratory Exam


Respiratory Exam: Clear to Ausculation Bilateral, NORMAL BREATHING PATTERN





- Cardiovascular Exam


Cardiovascular Exam: REGULAR RHYTHM, RRR, +S1, +S2.  absent: Murmur





- GI/Abdominal Exam


GI & Abdominal Exam: Soft, Normal Bowel Sounds.  absent: Tenderness





- Extremities Exam


Extremities Exam: Full ROM, Normal Capillary Refill, Normal Inspection.  absent

: Joint Swelling, Pedal Edema





- Back Exam


Back Exam: NORMAL INSPECTION





- Neurological Exam


Neurological Exam: Alert, Awake, CN II-XII Intact, Normal Gait, Oriented x3





- Psychiatric Exam


Psychiatric exam: Normal Affect, Normal Mood





- Skin


Skin Exam: Dry, Intact, Normal Color, Warm





Assessment and Plan


(1) DVT prophylaxis


Status: Chronic   





(2) Scrotal edema


Status: Chronic   





(3) CAD (coronary artery disease)


Status: Chronic   





(4) Smoking


Status: Chronic   





(5) Low back pain


Status: Chronic   





(6) Prediabetes


Status: Chronic   





(7) Neurocognitive disorder


Status: Chronic

## 2018-03-21 RX ADMIN — DIVALPROEX SODIUM SCH MG: 250 TABLET, DELAYED RELEASE ORAL at 09:50

## 2018-03-21 RX ADMIN — DIVALPROEX SODIUM SCH MG: 250 TABLET, DELAYED RELEASE ORAL at 17:58

## 2018-03-21 NOTE — CP.PCM.PN
Subjective





- Date & Time of Evaluation


Date of Evaluation: 03/21/18


Time of Evaluation: 08:00





- Subjective


Subjective: 








s/o no f/c, n/v/d. no other complaints.calm and cooperative. no abd pain, 


a/p cont plan, cont placement, cont all medical management, cont psych meds


remains unable to make medical decisians for self. pending state disability/

guardianship/placement





Objective





- Vital Signs/Intake and Output


Vital Signs (last 24 hours): 


 











Temp Pulse Resp BP Pulse Ox


 


 97.8 F   72   19   100/68   96 


 


 03/21/18 07:46  03/21/18 07:46  03/21/18 07:46  03/21/18 07:46  03/21/18 07:46











- Medications


Medications: 


 Current Medications





Atorvastatin Calcium (Lipitor)  20 mg PO HS [facility]


   Last Admin: 03/20/18 21:34 Dose:  20 mg


Divalproex Sodium (Depakote Dr(*Bid*))  500 mg PO BID [facility]


   Last Admin: 03/20/18 17:00 Dose:  500 mg


Enalapril Maleate (Vasotec)  10 mg PO DAILY [facility]


   Last Admin: 03/20/18 10:58 Dose:  10 mg


Fenofibrate (Tricor)  145 mg PO DAILY [facility]


   Last Admin: 03/20/18 10:59 Dose:  145 mg


Metoprolol Tartrate (Lopressor)  50 mg PO Q12 [facility]


   Last Admin: 03/20/18 21:34 Dose:  50 mg


Quetiapine Fumarate (Seroquel)  50 mg PO Q12 [facility]


   Last Admin: 03/20/18 21:34 Dose:  50 mg











- Labs


Labs: 


 





 12/19/17 05:45 





 12/19/17 05:45 





 











PT  11.2 SECONDS (9.4-12.0)   12/14/15  05:20    


 


INR  1.05  (0.89-1.20)   12/14/15  05:20    


 


APTT  36.2 SECONDS (23.1-32.0)  H  12/14/15  05:20    














- Constitutional


Appears: Well, Non-toxic, No Acute Distress





- Head Exam


Head Exam: ATRAUMATIC, NORMAL INSPECTION, NORMOCEPHALIC





- Eye Exam


Eye Exam: EOMI, Normal appearance, PERRL


Pupil Exam: NORMAL ACCOMODATION, PERRL





- ENT Exam


ENT Exam: Mucous Membranes Moist, Normal Exam





- Neck Exam


Neck Exam: Full ROM, Normal Inspection.  absent: Lymphadenopathy





- Respiratory Exam


Respiratory Exam: Clear to Ausculation Bilateral, NORMAL BREATHING PATTERN





- Cardiovascular Exam


Cardiovascular Exam: REGULAR RHYTHM, RRR, +S1, +S2.  absent: Murmur





- GI/Abdominal Exam


GI & Abdominal Exam: Soft, Normal Bowel Sounds.  absent: Tenderness





- Extremities Exam


Extremities Exam: Full ROM, Normal Capillary Refill, Normal Inspection.  absent

: Joint Swelling, Pedal Edema





- Back Exam


Back Exam: NORMAL INSPECTION





- Neurological Exam


Neurological Exam: Alert, Awake, CN II-XII Intact, Normal Gait, Oriented x3





- Psychiatric Exam


Psychiatric exam: Normal Affect, Normal Mood





- Skin


Skin Exam: Dry, Intact, Normal Color, Warm





Assessment and Plan


(1) DVT prophylaxis


Status: Chronic   





(2) Scrotal edema


Status: Chronic   





(3) CAD (coronary artery disease)


Status: Chronic   





(4) Smoking


Status: Chronic   





(5) Low back pain


Status: Chronic   





(6) Prediabetes


Status: Chronic   





(7) Neurocognitive disorder


Status: Chronic

## 2018-03-22 RX ADMIN — DIVALPROEX SODIUM SCH MG: 250 TABLET, DELAYED RELEASE ORAL at 08:18

## 2018-03-22 RX ADMIN — DIVALPROEX SODIUM SCH MG: 250 TABLET, DELAYED RELEASE ORAL at 16:06

## 2018-03-22 NOTE — CP.PCM.PN
Subjective





- Date & Time of Evaluation


Date of Evaluation: 03/22/18


Time of Evaluation: 08:12





- Subjective


Subjective: 





s/o no f/c, n/v/d. no other complaints.calm and cooperative. no abd pain, 


a/p cont plan, cont placement, cont all medical management, cont psych meds


remains unable to make medical decisians for self. pending state disability/

guardianship/placement





Objective





- Vital Signs/Intake and Output


Vital Signs (last 24 hours): 


 











Temp Pulse Resp BP Pulse Ox


 


 97.5 F L  70   18   108/74   97 


 


 03/22/18 00:07  03/22/18 00:07  03/22/18 00:07  03/22/18 00:07  03/22/18 00:07











- Medications


Medications: 


 Current Medications





Atorvastatin Calcium (Lipitor)  [facility]


   Last Admin: 03/21/18 21:35 Dose:  20 mg


Divalproex Sodium (Depakote Dr(*Bid*))  500 mg PO BID [facility]


   Last Admin: 03/21/18 17:58 Dose:  500 mg


Enalapril Maleate (Vasotec)  10 mg PO DAILY [facility]


   Last Admin: 03/21/18 09:52 Dose:  Not Given


Fenofibrate (Tricor)  145 mg PO DAILY [facility]


   Last Admin: 03/21/18 09:52 Dose:  145 mg


Metoprolol Tartrate (Lopressor)  50 mg PO Q12 [facility]


   Last Admin: 03/21/18 21:34 Dose:  50 mg


Quetiapine Fumarate (Seroquel)  50 mg PO Q12 [facility]


   Last Admin: 03/21/18 21:35 Dose:  50 mg











- Labs


Labs: 


 





 12/19/17 05:45 





 12/19/17 05:45 





 











PT  11.2 SECONDS (9.4-12.0)   12/14/15  05:20    


 


INR  1.05  (0.89-1.20)   12/14/15  05:20    


 


APTT  36.2 SECONDS (23.1-32.0)  H  12/14/15  05:20    














- Constitutional


Appears: Well, Non-toxic, No Acute Distress





- Head Exam


Head Exam: ATRAUMATIC, NORMAL INSPECTION, NORMOCEPHALIC





- Eye Exam


Eye Exam: EOMI, Normal appearance, PERRL


Pupil Exam: NORMAL ACCOMODATION, PERRL





- ENT Exam


ENT Exam: Mucous Membranes Moist, Normal Exam





- Neck Exam


Neck Exam: Full ROM, Normal Inspection.  absent: Lymphadenopathy





- Respiratory Exam


Respiratory Exam: Clear to Ausculation Bilateral, NORMAL BREATHING PATTERN





- Cardiovascular Exam


Cardiovascular Exam: REGULAR RHYTHM, RRR, +S1, +S2.  absent: Murmur





- GI/Abdominal Exam


GI & Abdominal Exam: Soft, Normal Bowel Sounds.  absent: Tenderness





- Extremities Exam


Extremities Exam: Full ROM, Normal Capillary Refill, Normal Inspection.  absent

: Joint Swelling, Pedal Edema





- Back Exam


Back Exam: NORMAL INSPECTION





- Neurological Exam


Neurological Exam: Alert, Awake, CN II-XII Intact, Normal Gait, Oriented x3





- Psychiatric Exam


Psychiatric exam: Normal Affect, Normal Mood





- Skin


Skin Exam: Dry, Intact, Normal Color, Warm





Assessment and Plan


(1) DVT prophylaxis


Status: Chronic   





(2) Scrotal edema


Status: Chronic   





(3) CAD (coronary artery disease)


Status: Chronic   





(4) Smoking


Status: Chronic   





(5) Low back pain


Status: Chronic   





(6) Prediabetes


Status: Chronic   





(7) Neurocognitive disorder


Status: Chronic

## 2018-03-23 RX ADMIN — DIVALPROEX SODIUM SCH MG: 250 TABLET, DELAYED RELEASE ORAL at 17:24

## 2018-03-23 RX ADMIN — DIVALPROEX SODIUM SCH MG: 250 TABLET, DELAYED RELEASE ORAL at 09:52

## 2018-03-23 NOTE — CP.PCM.PN
Subjective





- Date & Time of Evaluation


Date of Evaluation: 03/23/18


Time of Evaluation: 07:00





- Subjective


Subjective: 





s/o no f/c, n/v/d. no other complaints.calm and cooperative. no abd pain, 


a/p cont plan, cont placement, cont all medical management, cont psych meds


remains unable to make medical decisians for self. pending state disability/

guardianship/placement





Objective





- Vital Signs/Intake and Output


Vital Signs (last 24 hours): 


 











Temp Pulse Resp BP Pulse Ox


 


 97.4 F L  61   18   99/63 L  98 


 


 03/23/18 07:54  03/23/18 09:52  03/23/18 07:54  03/23/18 09:52  03/23/18 07:54











- Medications


Medications: 


 Current Medications





Atorvastatin Calcium (Lipitor)  20 mg PO HS [facility]


   Last Admin: 03/22/18 21:27 Dose:  20 mg


Divalproex Sodium (Depakote Dr(*Bid*))  500 mg PO BID [facility]


   Last Admin: 03/23/18 09:52 Dose:  500 mg


Enalapril Maleate (Vasotec)  10 mg PO DAILY [facility]


   Last Admin: 03/23/18 09:53 Dose:  Not Given


Fenofibrate (Tricor)  145 mg PO DAILY [facility]


   Last Admin: 03/23/18 09:53 Dose:  145 mg


Metoprolol Tartrate (Lopressor)  50 mg PO Q12 [facility]


   Last Admin: 03/23/18 09:52 Dose:  Not Given


Quetiapine Fumarate (Seroquel)  50 mg PO Q12 [facility]


   Last Admin: 03/23/18 09:52 Dose:  50 mg











- Labs


Labs: 


 





 12/19/17 05:45 





 12/19/17 05:45 





 











PT  11.2 SECONDS (9.4-12.0)   12/14/15  05:20    


 


INR  1.05  (0.89-1.20)   12/14/15  05:20    


 


APTT  36.2 SECONDS (23.1-32.0)  H  12/14/15  05:20    














- Constitutional


Appears: Well, Non-toxic, No Acute Distress





- Head Exam


Head Exam: ATRAUMATIC, NORMAL INSPECTION, NORMOCEPHALIC





- Eye Exam


Eye Exam: EOMI, Normal appearance, PERRL


Pupil Exam: NORMAL ACCOMODATION, PERRL





- ENT Exam


ENT Exam: Mucous Membranes Moist, Normal Exam





- Neck Exam


Neck Exam: Full ROM, Normal Inspection.  absent: Lymphadenopathy





- Respiratory Exam


Respiratory Exam: Clear to Ausculation Bilateral, NORMAL BREATHING PATTERN





- Cardiovascular Exam


Cardiovascular Exam: REGULAR RHYTHM, RRR, +S1, +S2.  absent: Murmur





- GI/Abdominal Exam


GI & Abdominal Exam: Soft, Normal Bowel Sounds.  absent: Tenderness





- Extremities Exam


Extremities Exam: Full ROM, Normal Capillary Refill, Normal Inspection.  absent

: Joint Swelling, Pedal Edema





- Back Exam


Back Exam: NORMAL INSPECTION





- Neurological Exam


Neurological Exam: Alert, Awake, CN II-XII Intact, Normal Gait, Oriented x3





- Psychiatric Exam


Psychiatric exam: Normal Affect, Normal Mood





- Skin


Skin Exam: Dry, Intact, Normal Color, Warm





Assessment and Plan


(1) DVT prophylaxis


Status: Chronic   





(2) Scrotal edema


Status: Chronic   





(3) CAD (coronary artery disease)


Status: Chronic   





(4) Smoking


Status: Chronic   





(5) Low back pain


Status: Chronic   





(6) Prediabetes


Status: Chronic   





(7) Neurocognitive disorder


Status: Chronic

## 2018-03-24 RX ADMIN — DIVALPROEX SODIUM SCH MG: 250 TABLET, DELAYED RELEASE ORAL at 08:43

## 2018-03-24 RX ADMIN — DIVALPROEX SODIUM SCH MG: 250 TABLET, DELAYED RELEASE ORAL at 17:16

## 2018-03-24 NOTE — CP.PCM.PN
Subjective





- Date & Time of Evaluation


Date of Evaluation: 03/24/18


Time of Evaluation: 09:33





- Subjective


Subjective: 





s/o no f/c, n/v/d. no other complaints.calm and cooperative. no abd pain, 


a/p cont plan, cont placement, cont all medical management, cont psych meds


remains unable to make medical decisians for self. pending state disability/

guardianship/placement








Objective





- Vital Signs/Intake and Output


Vital Signs (last 24 hours): 


 











Temp Pulse Resp BP Pulse Ox


 


 97.7 F   79   20   95/69 L  99 


 


 03/24/18 07:40  03/24/18 07:40  03/24/18 07:40  03/24/18 07:40  03/24/18 07:40











- Medications


Medications: 


 Current Medications





Atorvastatin Calcium (Lipitor)  20 mg PO HS [facility]


   Last Admin: 03/23/18 21:28 Dose:  20 mg


Divalproex Sodium (Depakote Dr(*Bid*))  500 mg PO BID [facility]


   Last Admin: 03/24/18 08:43 Dose:  500 mg


Enalapril Maleate (Vasotec)  10 mg PO DAILY [facility]


   Last Admin: 03/24/18 08:43 Dose:  10 mg


Fenofibrate (Tricor)  145 mg PO DAILY [facility]


   Last Admin: 03/24/18 08:43 Dose:  145 mg


Metoprolol Tartrate (Lopressor)  50 mg PO Q12 [facility]


   Last Admin: 03/24/18 08:43 Dose:  Not Given


Quetiapine Fumarate (Seroquel)  50 mg PO Q12 [facility]


   Last Admin: 03/24/18 08:44 Dose:  50 mg











- Labs


Labs: 


 





 12/19/17 05:45 





 12/19/17 05:45 





 











PT  11.2 SECONDS (9.4-12.0)   12/14/15  05:20    


 


INR  1.05  (0.89-1.20)   12/14/15  05:20    


 


APTT  36.2 SECONDS (23.1-32.0)  H  12/14/15  05:20    














- Constitutional


Appears: Well, Non-toxic, No Acute Distress





- Head Exam


Head Exam: ATRAUMATIC, NORMAL INSPECTION, NORMOCEPHALIC





- Eye Exam


Eye Exam: EOMI, Normal appearance, PERRL


Pupil Exam: NORMAL ACCOMODATION, PERRL





- ENT Exam


ENT Exam: Mucous Membranes Moist, Normal Exam





- Neck Exam


Neck Exam: Full ROM, Normal Inspection.  absent: Lymphadenopathy





- Respiratory Exam


Respiratory Exam: Clear to Ausculation Bilateral, NORMAL BREATHING PATTERN





- Cardiovascular Exam


Cardiovascular Exam: REGULAR RHYTHM, RRR, +S1, +S2.  absent: Murmur





- GI/Abdominal Exam


GI & Abdominal Exam: Soft, Normal Bowel Sounds.  absent: Tenderness





- Rectal Exam


Rectal Exam: NORMAL INSPECTION





- Extremities Exam


Extremities Exam: Full ROM, Normal Capillary Refill, Normal Inspection.  absent

: Joint Swelling, Pedal Edema





- Back Exam


Back Exam: NORMAL INSPECTION





- Neurological Exam


Neurological Exam: Alert, Awake, CN II-XII Intact, Normal Gait, Oriented x3





- Psychiatric Exam


Psychiatric exam: Normal Affect, Normal Mood





- Skin


Skin Exam: Dry, Intact, Normal Color, Warm





Assessment and Plan


(1) DVT prophylaxis


Status: Chronic   





(2) Scrotal edema


Status: Chronic   





(3) CAD (coronary artery disease)


Status: Chronic   





(4) Smoking


Status: Chronic   





(5) Low back pain


Status: Chronic   





(6) Prediabetes


Status: Chronic   





(7) Neurocognitive disorder


Status: Chronic

## 2018-03-25 RX ADMIN — DIVALPROEX SODIUM SCH MG: 250 TABLET, DELAYED RELEASE ORAL at 10:22

## 2018-03-25 RX ADMIN — DIVALPROEX SODIUM SCH MG: 250 TABLET, DELAYED RELEASE ORAL at 16:50

## 2018-03-25 NOTE — CP.PCM.PN
Subjective





- Date & Time of Evaluation


Date of Evaluation: 03/25/18


Time of Evaluation: 09:31





- Subjective


Subjective: 





s/o no f/c, n/v/d. no other complaints.calm and cooperative. no abd pain, 


a/p cont plan, cont placement, cont all medical management, cont psych meds


remains unable to make medical decisians for self. pending state disability/

guardianship/placement





Objective





- Vital Signs/Intake and Output


Vital Signs (last 24 hours): 


 











Temp Pulse Resp BP Pulse Ox


 


 98.9 F   79   19   105/71   97 


 


 03/25/18 07:36  03/25/18 07:36  03/25/18 07:36  03/25/18 07:36  03/25/18 07:36











- Medications


Medications: 


 Current Medications





Atorvastatin Calcium (Lipitor)  20 mg PO HS [facility]


   Last Admin: 03/24/18 21:13 Dose:  20 mg


Divalproex Sodium (Depakote Dr(*Bid*))  500 mg PO BID [facility]


   Last Admin: 03/24/18 17:16 Dose:  500 mg


Enalapril Maleate (Vasotec)  10 mg PO DAILY [facility]


   Last Admin: 03/24/18 08:43 Dose:  10 mg


Fenofibrate (Tricor)  145 mg PO DAILY [facility]


   Last Admin: 03/24/18 08:43 Dose:  145 mg


Metoprolol Tartrate (Lopressor)  50 mg PO Q12 [facility]


   Last Admin: 03/24/18 21:13 Dose:  50 mg


Quetiapine Fumarate (Seroquel)  50 mg PO Q12 [facility]


   Last Admin: 03/24/18 21:13 Dose:  50 mg











- Labs


Labs: 


 





 12/19/17 05:45 





 12/19/17 05:45 





 











PT  11.2 SECONDS (9.4-12.0)   12/14/15  05:20    


 


INR  1.05  (0.89-1.20)   12/14/15  05:20    


 


APTT  36.2 SECONDS (23.1-32.0)  H  12/14/15  05:20    














- Constitutional


Appears: Well, Non-toxic, No Acute Distress





- Head Exam


Head Exam: ATRAUMATIC, NORMAL INSPECTION, NORMOCEPHALIC





- Eye Exam


Eye Exam: EOMI, Normal appearance, PERRL


Pupil Exam: NORMAL ACCOMODATION, PERRL





- ENT Exam


ENT Exam: Mucous Membranes Moist, Normal Exam





- Neck Exam


Neck Exam: Full ROM, Normal Inspection.  absent: Lymphadenopathy





- Respiratory Exam


Respiratory Exam: Clear to Ausculation Bilateral, NORMAL BREATHING PATTERN





- Cardiovascular Exam


Cardiovascular Exam: REGULAR RHYTHM, RRR, +S1, +S2.  absent: Murmur





- GI/Abdominal Exam


GI & Abdominal Exam: Soft, Normal Bowel Sounds.  absent: Tenderness





- Extremities Exam


Extremities Exam: Full ROM, Normal Capillary Refill, Normal Inspection.  absent

: Joint Swelling, Pedal Edema





- Back Exam


Back Exam: NORMAL INSPECTION





- Neurological Exam


Neurological Exam: Alert, Awake, CN II-XII Intact, Normal Gait, Oriented x3





- Psychiatric Exam


Psychiatric exam: Normal Affect, Normal Mood





- Skin


Skin Exam: Dry, Intact, Normal Color, Warm





Assessment and Plan


(1) DVT prophylaxis


Status: Chronic   





(2) Scrotal edema


Status: Chronic   





(3) CAD (coronary artery disease)


Status: Chronic   





(4) Smoking


Status: Chronic   





(5) Low back pain


Status: Chronic   





(6) Prediabetes


Status: Chronic   





(7) Neurocognitive disorder


Status: Chronic

## 2018-03-26 RX ADMIN — DIVALPROEX SODIUM SCH MG: 250 TABLET, DELAYED RELEASE ORAL at 08:42

## 2018-03-26 RX ADMIN — DIVALPROEX SODIUM SCH MG: 250 TABLET, DELAYED RELEASE ORAL at 16:29

## 2018-03-26 NOTE — CP.PCM.PN
Subjective





- Date & Time of Evaluation


Date of Evaluation: 03/26/18


Time of Evaluation: 07:57





- Subjective


Subjective: 





s/o no f/c, n/v/d. no other complaints.calm and cooperative. no abd pain, 


a/p cont plan, cont placement, cont all medical management, cont psych meds


remains unable to make medical decisians for self. pending state disability/

guardianship/placement





Objective





- Vital Signs/Intake and Output


Vital Signs (last 24 hours): 


 











Temp Pulse Resp BP Pulse Ox


 


 97.4 F L  71   19   102/68   97 


 


 03/26/18 07:52  03/26/18 07:52  03/26/18 07:52  03/26/18 07:52  03/26/18 07:52











- Medications


Medications: 


 Current Medications





Atorvastatin Calcium (Lipitor)  [facility] HS [facility]


   Last Admin: 03/25/18 21:32 Dose:  20 mg


Divalproex Sodium (Depakote Dr(*Bid*))  500 mg PO BID [facility]


   Last Admin: 03/25/18 16:50 Dose:  500 mg


Enalapril Maleate (Vasotec)  10 mg PO DAILY [facility]


   Last Admin: 03/25/18 10:23 Dose:  10 mg


Fenofibrate (Tricor)  145 mg PO DAILY [facility]


   Last Admin: 03/25/18 10:23 Dose:  145 mg


Metoprolol Tartrate (Lopressor)  50 mg PO Q12 [facility]


   Last Admin: 03/25/18 21:32 Dose:  50 mg


Quetiapine Fumarate (Seroquel)  50 mg PO Q12 [facility]


   Last Admin: 03/25/18 21:32 Dose:  50 mg











- Labs


Labs: 


 





 12/19/17 05:45 





 12/19/17 05:45 





 











PT  11.2 SECONDS (9.4-12.0)   12/14/15  05:20    


 


INR  1.05  (0.89-1.20)   12/14/15  05:20    


 


APTT  36.2 SECONDS (23.1-32.0)  H  12/14/15  05:20    














- Constitutional


Appears: Well, Non-toxic, No Acute Distress





- Head Exam


Head Exam: ATRAUMATIC, NORMAL INSPECTION, NORMOCEPHALIC





- Eye Exam


Eye Exam: EOMI, Normal appearance, PERRL


Pupil Exam: NORMAL ACCOMODATION, PERRL





- ENT Exam


ENT Exam: Mucous Membranes Moist, Normal Exam





- Neck Exam


Neck Exam: Full ROM, Normal Inspection.  absent: Lymphadenopathy





- Respiratory Exam


Respiratory Exam: Clear to Ausculation Bilateral, NORMAL BREATHING PATTERN





- Cardiovascular Exam


Cardiovascular Exam: REGULAR RHYTHM, RRR, +S1, +S2.  absent: Murmur





- GI/Abdominal Exam


GI & Abdominal Exam: Soft, Normal Bowel Sounds.  absent: Tenderness





- Extremities Exam


Extremities Exam: Full ROM, Normal Capillary Refill, Normal Inspection.  absent

: Joint Swelling, Pedal Edema





- Back Exam


Back Exam: NORMAL INSPECTION





- Neurological Exam


Neurological Exam: Alert, Awake, CN II-XII Intact, Normal Gait, Oriented x3





- Psychiatric Exam


Psychiatric exam: Normal Affect, Normal Mood





- Skin


Skin Exam: Dry, Intact, Normal Color, Warm





Assessment and Plan


(1) DVT prophylaxis


Status: Chronic   





(2) Scrotal edema


Status: Chronic   





(3) CAD (coronary artery disease)


Status: Chronic   





(4) Smoking


Status: Chronic   





(5) Low back pain


Status: Chronic   





(6) Prediabetes


Status: Chronic   





(7) Neurocognitive disorder


Status: Chronic

## 2018-03-27 RX ADMIN — DIVALPROEX SODIUM SCH MG: 250 TABLET, DELAYED RELEASE ORAL at 09:22

## 2018-03-27 RX ADMIN — DIVALPROEX SODIUM SCH MG: 250 TABLET, DELAYED RELEASE ORAL at 17:10

## 2018-03-27 NOTE — CP.PCM.PN
Subjective





- Date & Time of Evaluation


Date of Evaluation: 03/27/18


Time of Evaluation: 07:21





- Subjective


Subjective: 





s/o no f/c, n/v/d. no other complaints.calm and cooperative. no abd pain, 


a/p cont plan, cont placement, cont all medical management, cont psych meds


remains unable to make medical decisians for self. pending state disability/

guardianship/placement





Objective





- Vital Signs/Intake and Output


Vital Signs (last 24 hours): 


 











Temp Pulse Resp BP Pulse Ox


 


 98.2 F   78   20   98/65 L  96 


 


 03/26/18 23:57  03/26/18 23:57  03/26/18 23:57  03/26/18 23:57  03/26/18 23:57











- Medications


Medications: 


 Current Medications





Atorvastatin Calcium (Lipitor)  20 mg PO HS [facility]


   Last Admin: 03/26/18 21:39 Dose:  20 mg


Divalproex Sodium (Depakote Dr(*Bid*))  500 mg PO BID [facility]


   Last Admin: 03/26/18 16:29 Dose:  500 mg


Enalapril Maleate (Vasotec)  10 mg PO DAILY [facility]


   Last Admin: 03/26/18 08:42 Dose:  10 mg


Fenofibrate (Tricor)  145 mg PO DAILY [facility]


   Last Admin: 03/26/18 08:41 Dose:  145 mg


Metoprolol Tartrate (Lopressor)  50 mg PO Q12 [facility]


   Last Admin: 03/26/18 21:39 Dose:  50 mg


Quetiapine Fumarate (Seroquel)  50 mg PO Q12 [facility]


   Last Admin: 03/26/18 21:39 Dose:  50 mg











- Labs


Labs: 


 





 12/19/17 05:45 





 12/19/17 05:45 





 











PT  11.2 SECONDS (9.4-12.0)   12/14/15  05:20    


 


INR  1.05  (0.89-1.20)   12/14/15  05:20    


 


APTT  36.2 SECONDS (23.1-32.0)  H  12/14/15  05:20    














- Constitutional


Appears: Well, Non-toxic, No Acute Distress





- Head Exam


Head Exam: ATRAUMATIC, NORMAL INSPECTION, NORMOCEPHALIC





- Eye Exam


Eye Exam: EOMI, Normal appearance, PERRL


Pupil Exam: NORMAL ACCOMODATION, PERRL





- ENT Exam


ENT Exam: Mucous Membranes Moist, Normal Exam





- Neck Exam


Neck Exam: Full ROM, Normal Inspection.  absent: Lymphadenopathy





- Respiratory Exam


Respiratory Exam: Clear to Ausculation Bilateral, NORMAL BREATHING PATTERN





- Cardiovascular Exam


Cardiovascular Exam: REGULAR RHYTHM, RRR, +S1, +S2.  absent: Murmur





- GI/Abdominal Exam


GI & Abdominal Exam: Soft, Normal Bowel Sounds.  absent: Tenderness





- Extremities Exam


Extremities Exam: Full ROM, Normal Capillary Refill, Normal Inspection.  absent

: Joint Swelling, Pedal Edema





- Back Exam


Back Exam: NORMAL INSPECTION





- Neurological Exam


Neurological Exam: Alert, Awake, CN II-XII Intact, Normal Gait, Oriented x3





- Psychiatric Exam


Psychiatric exam: Normal Affect, Normal Mood





- Skin


Skin Exam: Dry, Intact, Normal Color, Warm





Assessment and Plan


(1) DVT prophylaxis


Status: Chronic   





(2) Scrotal edema


Status: Chronic   





(3) CAD (coronary artery disease)


Status: Chronic   





(4) Smoking


Status: Chronic   





(5) Low back pain


Status: Chronic   





(6) Prediabetes


Status: Chronic   





(7) Neurocognitive disorder


Status: Chronic

## 2018-03-28 RX ADMIN — DIVALPROEX SODIUM SCH MG: 250 TABLET, DELAYED RELEASE ORAL at 18:22

## 2018-03-28 RX ADMIN — DIVALPROEX SODIUM SCH MG: 250 TABLET, DELAYED RELEASE ORAL at 13:46

## 2018-03-28 NOTE — CP.PCM.PN
Subjective





- Date & Time of Evaluation


Date of Evaluation: 03/28/18


Time of Evaluation: 07:28





- Subjective


Subjective: 





s/o no f/c, n/v/d. no other complaints.calm and cooperative. no abd pain, 


a/p cont plan, cont placement, cont all medical management, cont psych meds


remains unable to make medical decisians for self. pending state disability/

guardianship/placement





Objective





- Vital Signs/Intake and Output


Vital Signs (last 24 hours): 


 











Temp Pulse Resp BP Pulse Ox


 


 97.6 F   77   20   106/74   95 


 


 03/28/18 00:29  03/28/18 00:29  03/28/18 00:29  03/28/18 00:29  03/28/18 00:29











- Medications


Medications: 


 Current Medications





Atorvastatin Calcium (Lipitor)  20 mg PO HS [facility]


   Last Admin: 03/27/18 21:21 Dose:  20 mg


Divalproex Sodium (Depakote Dr(*Bid*))  500 mg PO BID [facility]


   Last Admin: 03/27/18 17:10 Dose:  500 mg


Enalapril Maleate (Vasotec)  10 mg PO DAILY [facility]


   Last Admin: 03/27/18 09:25 Dose:  Not Given


Fenofibrate (Tricor)  145 mg PO DAILY [facility]


   Last Admin: 03/27/18 09:24 Dose:  145 mg


Metoprolol Tartrate (Lopressor)  50 mg PO Q12 [facility]


   Last Admin: 03/27/18 21:21 Dose:  50 mg


Quetiapine Fumarate (Seroquel)  50 mg PO Q12 [facility]


   Last Admin: 03/27/18 21:21 Dose:  50 mg











- Labs


Labs: 


 





 12/19/17 05:45 





 12/19/17 05:45 





 











PT  11.2 SECONDS (9.4-12.0)   12/14/15  05:20    


 


INR  1.05  (0.89-1.20)   12/14/15  05:20    


 


APTT  36.2 SECONDS (23.1-32.0)  H  12/14/15  05:20    














- Constitutional


Appears: Well, Non-toxic, No Acute Distress





- Head Exam


Head Exam: ATRAUMATIC, NORMAL INSPECTION, NORMOCEPHALIC





- Eye Exam


Eye Exam: EOMI, Normal appearance, PERRL


Pupil Exam: NORMAL ACCOMODATION, PERRL





- ENT Exam


ENT Exam: Mucous Membranes Moist, Normal Exam





- Neck Exam


Neck Exam: Full ROM, Normal Inspection.  absent: Lymphadenopathy





- Respiratory Exam


Respiratory Exam: Clear to Ausculation Bilateral, NORMAL BREATHING PATTERN





- Cardiovascular Exam


Cardiovascular Exam: REGULAR RHYTHM, RRR, +S1, +S2.  absent: Murmur





- GI/Abdominal Exam


GI & Abdominal Exam: Soft, Normal Bowel Sounds.  absent: Tenderness





- Extremities Exam


Extremities Exam: Full ROM, Normal Capillary Refill, Normal Inspection.  absent

: Joint Swelling, Pedal Edema





- Back Exam


Back Exam: NORMAL INSPECTION





- Neurological Exam


Neurological Exam: Alert, Awake, CN II-XII Intact, Normal Gait, Oriented x3





- Psychiatric Exam


Psychiatric exam: Normal Affect, Normal Mood





- Skin


Skin Exam: Dry, Intact, Normal Color, Warm





Assessment and Plan


(1) DVT prophylaxis


Status: Chronic   





(2) Scrotal edema


Status: Chronic   





(3) CAD (coronary artery disease)


Status: Chronic   





(4) Smoking


Status: Chronic   





(5) Low back pain


Status: Chronic   





(6) Prediabetes


Status: Chronic   





(7) Neurocognitive disorder


Status: Chronic

## 2018-03-29 RX ADMIN — DIVALPROEX SODIUM SCH MG: 250 TABLET, DELAYED RELEASE ORAL at 11:13

## 2018-03-29 RX ADMIN — DIVALPROEX SODIUM SCH MG: 250 TABLET, DELAYED RELEASE ORAL at 18:53

## 2018-03-29 NOTE — CP.PCM.PN
Subjective





- Date & Time of Evaluation


Date of Evaluation: 03/29/18


Time of Evaluation: 07:30





- Subjective


Subjective: 





s/o no f/c, n/v/d. no other complaints.calm and cooperative. no abd pain, 


a/p cont plan, cont placement, cont all medical management, cont psych meds


remains unable to make medical decisians for self. pending state disability/

guardianship/placement





Objective





- Vital Signs/Intake and Output


Vital Signs (last 24 hours): 


 











Temp Pulse Resp BP Pulse Ox


 


 98.1 F   75   20   95/64 L  95 


 


 03/29/18 01:00  03/29/18 01:00  03/29/18 01:00  03/29/18 01:00  03/29/18 01:00











- Medications


Medications: 


 Current Medications





Atorvastatin Calcium (Lipitor)  20 mg PO HS [facility]


   Last Admin: 03/28/18 21:36 Dose:  20 mg


Divalproex Sodium (Depakote Dr(*Bid*))  500 mg PO BID [facility]


   Last Admin: 03/28/18 18:22 Dose:  500 mg


Enalapril Maleate (Vasotec)  10 mg PO DAILY [facility]


   Last Admin: 03/28/18 09:20 Dose:  10 mg


Fenofibrate (Tricor)  145 mg PO DAILY [facility]


   Last Admin: 03/28/18 09:19 Dose:  145 mg


Metoprolol Tartrate (Lopressor)  50 mg PO Q12 [facility]


   Last Admin: 03/28/18 21:36 Dose:  Not Given


Quetiapine Fumarate (Seroquel)  50 mg PO Q12 [facility]


   Last Admin: 03/28/18 21:36 Dose:  50 mg











- Labs


Labs: 


 





 12/19/17 05:45 





 12/19/17 05:45 





 











PT  11.2 SECONDS (9.4-12.0)   12/14/15  05:20    


 


INR  1.05  (0.89-1.20)   12/14/15  05:20    


 


APTT  36.2 SECONDS (23.1-32.0)  H  12/14/15  05:20    














- Constitutional


Appears: Well, Non-toxic, No Acute Distress





- Head Exam


Head Exam: ATRAUMATIC, NORMAL INSPECTION, NORMOCEPHALIC





- Eye Exam


Eye Exam: EOMI, Normal appearance, PERRL


Pupil Exam: NORMAL ACCOMODATION, PERRL





- ENT Exam


ENT Exam: Mucous Membranes Moist, Normal Exam





- Neck Exam


Neck Exam: Full ROM, Normal Inspection.  absent: Lymphadenopathy





- Respiratory Exam


Respiratory Exam: Clear to Ausculation Bilateral, NORMAL BREATHING PATTERN





- Cardiovascular Exam


Cardiovascular Exam: REGULAR RHYTHM, RRR, +S1, +S2.  absent: Murmur





- GI/Abdominal Exam


GI & Abdominal Exam: Soft, Normal Bowel Sounds.  absent: Tenderness





- Extremities Exam


Extremities Exam: Full ROM, Normal Capillary Refill, Normal Inspection.  absent

: Joint Swelling, Pedal Edema





- Back Exam


Back Exam: NORMAL INSPECTION





- Neurological Exam


Neurological Exam: Alert, Awake, CN II-XII Intact, Normal Gait, Oriented x3





- Psychiatric Exam


Psychiatric exam: Normal Affect, Normal Mood





- Skin


Skin Exam: Dry, Intact, Normal Color, Warm





Assessment and Plan


(1) DVT prophylaxis


Status: Chronic   





(2) Scrotal edema


Status: Chronic   





(3) CAD (coronary artery disease)


Status: Chronic   





(4) Smoking


Status: Chronic   





(5) Low back pain


Status: Chronic   





(6) Prediabetes


Status: Chronic   





(7) Neurocognitive disorder


Status: Chronic

## 2018-03-30 LAB
ALBUMIN SERPL-MCNC: 3.5 G/DL (ref 3.5–5)
ALBUMIN/GLOB SERPL: 0.9 {RATIO} (ref 1–2.1)
ALT SERPL-CCNC: 40 U/L (ref 21–72)
AST SERPL-CCNC: 25 U/L (ref 17–59)
BASOPHILS # BLD AUTO: 0 K/UL (ref 0–0.2)
BASOPHILS NFR BLD: 0.2 % (ref 0–2)
BUN SERPL-MCNC: 22 MG/DL (ref 9–20)
CALCIUM SERPL-MCNC: 9.2 MG/DL (ref 8.4–10.2)
EOSINOPHIL # BLD AUTO: 0.5 K/UL (ref 0–0.7)
EOSINOPHIL NFR BLD: 6.7 % (ref 0–4)
ERYTHROCYTE [DISTWIDTH] IN BLOOD BY AUTOMATED COUNT: 13.7 % (ref 11.5–14.5)
GFR NON-AFRICAN AMERICAN: 57
HDLC SERPL-MCNC: 26 MG/DL (ref 30–70)
HGB BLD-MCNC: 12.2 G/DL (ref 12–18)
LDLC SERPL-MCNC: 97 MG/DL (ref 0–129)
LYMPHOCYTES # BLD AUTO: 3.6 K/UL (ref 1–4.3)
LYMPHOCYTES NFR BLD AUTO: 43.9 % (ref 20–40)
MCH RBC QN AUTO: 30.7 PG (ref 27–31)
MCHC RBC AUTO-ENTMCNC: 33.3 G/DL (ref 33–37)
MCV RBC AUTO: 92.2 FL (ref 80–94)
MONOCYTES # BLD: 0.8 K/UL (ref 0–0.8)
MONOCYTES NFR BLD: 9.3 % (ref 0–10)
NEUTROPHILS # BLD: 3.3 K/UL (ref 1.8–7)
NEUTROPHILS NFR BLD AUTO: 39.9 % (ref 50–75)
NRBC BLD AUTO-RTO: 0.1 % (ref 0–0)
PLATELET # BLD: 230 K/UL (ref 130–400)
PMV BLD AUTO: 7.4 FL (ref 7.2–11.7)
RBC # BLD AUTO: 3.99 MIL/UL (ref 4.4–5.9)
VIT B12 SERPL-MCNC: 514 PG/ML (ref 239–931)
WBC # BLD AUTO: 8.2 K/UL (ref 4.8–10.8)

## 2018-03-30 RX ADMIN — DIVALPROEX SODIUM SCH MG: 250 TABLET, DELAYED RELEASE ORAL at 16:28

## 2018-03-30 RX ADMIN — DIVALPROEX SODIUM SCH MG: 250 TABLET, DELAYED RELEASE ORAL at 08:27

## 2018-03-30 NOTE — CP.PCM.PN
Subjective





- Date & Time of Evaluation


Date of Evaluation: 03/30/18


Time of Evaluation: 07:37





- Subjective


Subjective: 





s/o no f/c, n/v/d. no other complaints.calm and cooperative. no abd pain, 


a/p cont plan, cont placement, cont all medical management, cont psych meds


remains unable to make medical decisians for self. pending state disability/

guardianship/placement





Objective





- Vital Signs/Intake and Output


Vital Signs (last 24 hours): 


 











Temp Pulse Resp BP Pulse Ox


 


 98 F   78   20   105/66   95 


 


 03/29/18 23:46  03/29/18 23:46  03/29/18 23:46  03/29/18 23:46  03/29/18 23:46











- Medications


Medications: 


 Current Medications





Atorvastatin Calcium (Lipitor)  20 mg PO HS [facility]


   Last Admin: 03/29/18 21:38 Dose:  20 mg


Divalproex Sodium (Depakote Dr(*Bid*))  500 mg PO BID [facility]


   Last Admin: 03/29/18 18:53 Dose:  500 mg


Enalapril Maleate (Vasotec)  10 mg PO DAILY [facility]


   Last Admin: 03/29/18 11:15 Dose:  10 mg


Fenofibrate (Tricor)  145 mg PO DAILY [facility]


   Last Admin: 03/29/18 11:15 Dose:  145 mg


Metoprolol Tartrate (Lopressor)  50 mg PO Q12 [facility]


   Last Admin: 03/29/18 21:37 Dose:  50 mg


Quetiapine Fumarate (Seroquel)  50 mg PO Q12 [facility]


   Last Admin: 03/29/18 21:37 Dose:  50 mg











- Labs


Labs: 


 





 03/30/18 05:30 





 03/30/18 05:30 





 











PT  11.2 SECONDS (9.4-12.0)   12/14/15  05:20    


 


INR  1.05  (0.89-1.20)   12/14/15  05:20    


 


APTT  36.2 SECONDS (23.1-32.0)  H  12/14/15  05:20    














- Constitutional


Appears: Well, Non-toxic, No Acute Distress





- Head Exam


Head Exam: ATRAUMATIC, NORMAL INSPECTION, NORMOCEPHALIC





- Eye Exam


Eye Exam: EOMI, Normal appearance, PERRL


Pupil Exam: NORMAL ACCOMODATION, PERRL





- ENT Exam


ENT Exam: Mucous Membranes Moist, Normal Exam





- Neck Exam


Neck Exam: Full ROM, Normal Inspection.  absent: Lymphadenopathy





- Respiratory Exam


Respiratory Exam: Clear to Ausculation Bilateral, NORMAL BREATHING PATTERN





- Cardiovascular Exam


Cardiovascular Exam: REGULAR RHYTHM, RRR, +S1, +S2.  absent: Murmur





- GI/Abdominal Exam


GI & Abdominal Exam: Soft, Normal Bowel Sounds.  absent: Tenderness





- Extremities Exam


Extremities Exam: Full ROM, Normal Capillary Refill, Normal Inspection.  absent

: Joint Swelling, Pedal Edema





- Back Exam


Back Exam: NORMAL INSPECTION





- Neurological Exam


Neurological Exam: Alert, Awake, CN II-XII Intact, Normal Gait, Oriented x3





- Psychiatric Exam


Psychiatric exam: Normal Affect, Normal Mood





- Skin


Skin Exam: Dry, Intact, Normal Color, Warm





Assessment and Plan


(1) DVT prophylaxis


Status: Chronic   





(2) Scrotal edema


Status: Chronic   





(3) CAD (coronary artery disease)


Status: Chronic   





(4) Smoking


Status: Chronic   





(5) Low back pain


Status: Chronic   





(6) Prediabetes


Status: Chronic   





(7) Neurocognitive disorder


Status: Chronic

## 2018-03-31 RX ADMIN — DIVALPROEX SODIUM SCH MG: 250 TABLET, DELAYED RELEASE ORAL at 08:35

## 2018-03-31 RX ADMIN — DIVALPROEX SODIUM SCH MG: 250 TABLET, DELAYED RELEASE ORAL at 17:15

## 2018-03-31 NOTE — CP.PCM.PN
Subjective





- Date & Time of Evaluation


Date of Evaluation: 03/31/18


Time of Evaluation: 07:36





- Subjective


Subjective: 





s/o no f/c, n/v/d. no other complaints.calm and cooperative. no abd pain, 


a/p cont plan, cont placement, cont all medical management, cont psych meds


remains unable to make medical decisians for self. pending state disability/

guardianship/placement


bw noted, pt to be oob >50% 





Objective





- Vital Signs/Intake and Output


Vital Signs (last 24 hours): 


 











Temp Pulse Resp BP Pulse Ox


 


 97.7 F   79   18   104/69   98 


 


 03/30/18 23:33  03/30/18 23:33  03/30/18 23:33  03/30/18 23:33  03/30/18 23:33











- Medications


Medications: 


 Current Medications





Atorvastatin Calcium (Lipitor)  20 mg PO HS [facility]


   Last Admin: 03/30/18 21:36 Dose:  20 mg


Divalproex Sodium (Depakote Dr(*Bid*))  500 mg PO BID [facility]


   Last Admin: 03/30/18 16:28 Dose:  500 mg


Enalapril Maleate (Vasotec)  10 mg PO DAILY [facility]


   Last Admin: 03/30/18 08:28 Dose:  Not Given


Fenofibrate (Tricor)  145 mg PO DAILY [facility]


   Last Admin: 03/30/18 08:27 Dose:  145 mg


Metoprolol Tartrate (Lopressor)  50 mg PO Q12 [facility]


   Last Admin: 03/30/18 21:41 Dose:  50 mg


Quetiapine Fumarate (Seroquel)  50 mg PO Q12 [facility]


   Last Admin: 03/30/18 21:36 Dose:  50 mg











- Labs


Labs: 


 





 03/30/18 05:30 





 03/30/18 05:30 





 











PT  11.2 SECONDS (9.4-12.0)   12/14/15  05:20    


 


INR  1.05  (0.89-1.20)   12/14/15  05:20    


 


APTT  36.2 SECONDS (23.1-32.0)  H  12/14/15  05:20    














- Constitutional


Appears: Well, Non-toxic, No Acute Distress





- Head Exam


Head Exam: ATRAUMATIC, NORMAL INSPECTION, NORMOCEPHALIC





- Eye Exam


Eye Exam: EOMI, Normal appearance, PERRL


Pupil Exam: NORMAL ACCOMODATION, PERRL





- ENT Exam


ENT Exam: Mucous Membranes Moist, Normal Exam





- Neck Exam


Neck Exam: Full ROM, Normal Inspection.  absent: Lymphadenopathy





- Respiratory Exam


Respiratory Exam: Clear to Ausculation Bilateral, NORMAL BREATHING PATTERN





- Cardiovascular Exam


Cardiovascular Exam: REGULAR RHYTHM, +S1, +S2.  absent: Murmur





- GI/Abdominal Exam


GI & Abdominal Exam: Soft, Normal Bowel Sounds.  absent: Tenderness





- Extremities Exam


Extremities Exam: Full ROM, Normal Capillary Refill, Normal Inspection.  absent

: Joint Swelling, Pedal Edema





- Back Exam


Back Exam: NORMAL INSPECTION





- Neurological Exam


Neurological Exam: Alert, Awake, CN II-XII Intact, Normal Gait, Oriented x3





- Psychiatric Exam


Psychiatric exam: Normal Affect, Normal Mood





- Skin


Skin Exam: Dry, Intact, Normal Color, Warm





Assessment and Plan


(1) DVT prophylaxis


Status: Chronic   





(2) Scrotal edema


Status: Chronic   





(3) CAD (coronary artery disease)


Status: Chronic   





(4) Smoking


Status: Chronic   





(5) Low back pain


Status: Chronic   





(6) Prediabetes


Status: Chronic   





(7) Neurocognitive disorder


Status: Chronic

## 2018-04-01 RX ADMIN — DIVALPROEX SODIUM SCH MG: 250 TABLET, DELAYED RELEASE ORAL at 08:42

## 2018-04-01 RX ADMIN — DIVALPROEX SODIUM SCH MG: 250 TABLET, DELAYED RELEASE ORAL at 16:30

## 2018-04-01 NOTE — CP.PCM.PN
Subjective





- Date & Time of Evaluation


Date of Evaluation: 04/01/18


Time of Evaluation: 10:11





- Subjective


Subjective: 





s/o no f/c, n/v/d. no other complaints.calm and cooperative. no abd pain, 


a/p cont plan, cont placement, cont all medical management, cont psych meds


remains unable to make medical decisians for self. pending state disability/

guardianship/placement


bw noted, pt to be oob >50% 





Objective





- Vital Signs/Intake and Output


Vital Signs (last 24 hours): 


 











Temp Pulse Resp BP Pulse Ox


 


 97.4 F L  62   18   103/68   98 


 


 04/01/18 08:15  04/01/18 08:42  04/01/18 08:15  04/01/18 08:42  04/01/18 08:15











- Medications


Medications: 


 Current Medications





Atorvastatin Calcium (Lipitor)  20 mg PO HS [facility]


   Last Admin: 03/31/18 21:37 Dose:  20 mg


Divalproex Sodium (Depakote Dr(*Bid*))  500 mg PO BID [facility]


   Last Admin: 04/01/18 08:42 Dose:  500 mg


Enalapril Maleate (Vasotec)  10 mg PO DAILY [facility]


   Last Admin: 04/01/18 08:42 Dose:  10 mg


Fenofibrate (Tricor)  145 mg PO DAILY [facility]


   Last Admin: 04/01/18 08:42 Dose:  145 mg


Metoprolol Tartrate (Lopressor)  50 mg PO Q12 [facility]


   Last Admin: 04/01/18 08:42 Dose:  50 mg


Quetiapine Fumarate (Seroquel)  50 mg PO Q12 [facility]


   Last Admin: 04/01/18 08:42 Dose:  50 mg











- Labs


Labs: 


 





 03/30/18 05:30 





 03/30/18 05:30 





 











PT  11.2 SECONDS (9.4-12.0)   12/14/15  05:20    


 


INR  1.05  (0.89-1.20)   12/14/15  05:20    


 


APTT  36.2 SECONDS (23.1-32.0)  H  12/14/15  05:20    














- Constitutional


Appears: Well, Non-toxic, No Acute Distress





- Head Exam


Head Exam: ATRAUMATIC, NORMAL INSPECTION, NORMOCEPHALIC





- Eye Exam


Eye Exam: EOMI, Normal appearance, PERRL


Pupil Exam: NORMAL ACCOMODATION, PERRL





- ENT Exam


ENT Exam: Mucous Membranes Moist, Normal Exam





- Neck Exam


Neck Exam: Full ROM, Normal Inspection.  absent: Lymphadenopathy





- Respiratory Exam


Respiratory Exam: Clear to Ausculation Bilateral, NORMAL BREATHING PATTERN





- Cardiovascular Exam


Cardiovascular Exam: REGULAR RHYTHM, RRR, +S1, +S2.  absent: Murmur





- GI/Abdominal Exam


GI & Abdominal Exam: Soft, Normal Bowel Sounds.  absent: Tenderness





- Extremities Exam


Extremities Exam: Full ROM, Normal Capillary Refill, Normal Inspection.  absent

: Joint Swelling, Pedal Edema





- Back Exam


Back Exam: NORMAL INSPECTION





- Neurological Exam


Neurological Exam: Alert, Awake, CN II-XII Intact, Normal Gait, Oriented x3





- Psychiatric Exam


Psychiatric exam: Normal Affect, Normal Mood





- Skin


Skin Exam: Dry, Intact, Normal Color, Warm





Assessment and Plan


(1) DVT prophylaxis


Status: Chronic   





(2) Scrotal edema


Status: Chronic   





(3) CAD (coronary artery disease)


Status: Chronic   





(4) Smoking


Status: Chronic   





(5) Low back pain


Status: Chronic   





(6) Prediabetes


Status: Chronic   





(7) Neurocognitive disorder


Status: Chronic

## 2018-04-02 RX ADMIN — DIVALPROEX SODIUM SCH MG: 250 TABLET, DELAYED RELEASE ORAL at 16:24

## 2018-04-02 RX ADMIN — DIVALPROEX SODIUM SCH MG: 250 TABLET, DELAYED RELEASE ORAL at 09:25

## 2018-04-02 NOTE — CP.PCM.PN
Subjective





- Date & Time of Evaluation


Date of Evaluation: 04/02/18


Time of Evaluation: 07:23





- Subjective


Subjective: 





s/o no f/c, n/v/d. no other complaints.calm and cooperative. no abd pain, 


a/p cont plan, cont placement, cont all medical management, cont psych meds


remains unable to make medical decisians for self. pending state disability/

guardianship/placement


bw noted, pt to be oob >50% 








Objective





- Vital Signs/Intake and Output


Vital Signs (last 24 hours): 


 











Temp Pulse Resp BP Pulse Ox


 


 97.7 F   70   20   116/72   96 


 


 04/02/18 01:00  04/02/18 01:00  04/02/18 01:00  04/02/18 01:00  04/02/18 00:00











- Medications


Medications: 


 Current Medications





Atorvastatin Calcium (Lipitor)  20 mg PO HS [facility]


   Last Admin: 04/01/18 21:53 Dose:  20 mg


Divalproex Sodium (Depakote Dr(*Bid*))  500 mg PO BID [facility]


   Last Admin: 04/01/18 16:30 Dose:  500 mg


Enalapril Maleate (Vasotec)  10 mg PO DAILY [facility]


   Last Admin: 04/01/18 08:42 Dose:  10 mg


Fenofibrate (Tricor)  145 mg PO DAILY [facility]


   Last Admin: 04/01/18 08:42 Dose:  145 mg


Metoprolol Tartrate (Lopressor)  50 mg PO Q12 [facility]


   Last Admin: 04/01/18 21:54 Dose:  50 mg


Quetiapine Fumarate (Seroquel)  50 mg PO Q12 [facility]


   Last Admin: 04/01/18 21:53 Dose:  50 mg











- Labs


Labs: 


 





 03/30/18 05:30 





 03/30/18 05:30 





 











PT  11.2 SECONDS (9.4-12.0)   12/14/15  05:20    


 


INR  1.05  (0.89-1.20)   12/14/15  05:20    


 


APTT  36.2 SECONDS (23.1-32.0)  H  12/14/15  05:20    














- Constitutional


Appears: Well, Non-toxic, No Acute Distress





- Head Exam


Head Exam: ATRAUMATIC, NORMAL INSPECTION, NORMOCEPHALIC





- Eye Exam


Eye Exam: EOMI, Normal appearance, PERRL


Pupil Exam: NORMAL ACCOMODATION, PERRL





- ENT Exam


ENT Exam: Mucous Membranes Moist, Normal Exam





- Neck Exam


Neck Exam: Full ROM, Normal Inspection.  absent: Lymphadenopathy





- Respiratory Exam


Respiratory Exam: Clear to Ausculation Bilateral, NORMAL BREATHING PATTERN





- Cardiovascular Exam


Cardiovascular Exam: REGULAR RHYTHM, RRR, +S1, +S2.  absent: Murmur





- GI/Abdominal Exam


GI & Abdominal Exam: Soft, Normal Bowel Sounds.  absent: Tenderness





- Extremities Exam


Extremities Exam: Full ROM, Normal Capillary Refill, Normal Inspection.  absent

: Joint Swelling, Pedal Edema





- Back Exam


Back Exam: NORMAL INSPECTION





- Neurological Exam


Neurological Exam: Alert, Awake, CN II-XII Intact, Normal Gait, Oriented x3





- Psychiatric Exam


Psychiatric exam: Normal Affect, Normal Mood





- Skin


Skin Exam: Dry, Intact, Normal Color, Warm





Assessment and Plan


(1) DVT prophylaxis


Status: Chronic   





(2) Scrotal edema


Status: Chronic   





(3) CAD (coronary artery disease)


Status: Chronic   





(4) Smoking


Status: Chronic   





(5) Low back pain


Status: Chronic   





(6) Prediabetes


Status: Chronic   





(7) Neurocognitive disorder


Status: Chronic

## 2018-04-03 RX ADMIN — DIVALPROEX SODIUM SCH MG: 250 TABLET, DELAYED RELEASE ORAL at 16:30

## 2018-04-03 RX ADMIN — DIVALPROEX SODIUM SCH MG: 250 TABLET, DELAYED RELEASE ORAL at 08:51

## 2018-04-03 NOTE — CP.PCM.PN
Subjective





- Date & Time of Evaluation


Date of Evaluation: 04/03/18


Time of Evaluation: 07:30





- Subjective


Subjective: 





s/o no f/c, n/v/d. no other complaints.calm and cooperative. no abd pain, 


a/p cont plan, cont placement, cont all medical management, cont psych meds


remains unable to make medical decisians for self. pending state disability/

guardianship/placement


bw noted, pt to be oob >50% 





Objective





- Vital Signs/Intake and Output


Vital Signs (last 24 hours): 


 











Temp Pulse Resp BP Pulse Ox


 


 97.3 F L  83   18   109/72   98 


 


 04/02/18 23:40  04/02/18 23:40  04/02/18 23:40  04/02/18 23:40  04/02/18 23:40











- Medications


Medications: 


 Current Medications





Atorvastatin Calcium (Lipitor)  20 mg PO HS [facility]


   Last Admin: 04/02/18 21:23 Dose:  20 mg


Divalproex Sodium (Depakote Dr(*Bid*))  500 mg PO BID [facility]


   Last Admin: 04/02/18 16:24 Dose:  500 mg


Enalapril Maleate (Vasotec)  10 mg PO DAILY [facility]


   Last Admin: 04/02/18 09:26 Dose:  10 mg


Fenofibrate (Tricor)  145 mg PO DAILY [facility]


   Last Admin: 04/02/18 09:26 Dose:  145 mg


Metoprolol Tartrate (Lopressor)  50 mg PO Q12 [facility]


   Last Admin: 04/02/18 21:24 Dose:  50 mg


Quetiapine Fumarate (Seroquel)  50 mg PO Q12 [facility]


   Last Admin: 04/02/18 21:24 Dose:  50 mg











- Labs


Labs: 


 





 03/30/18 05:30 





 03/30/18 05:30 





 











PT  11.2 SECONDS (9.4-12.0)   12/14/15  05:20    


 


INR  1.05  (0.89-1.20)   12/14/15  05:20    


 


APTT  36.2 SECONDS (23.1-32.0)  H  12/14/15  05:20    














- Constitutional


Appears: Well, Non-toxic, No Acute Distress





- Head Exam


Head Exam: ATRAUMATIC, NORMAL INSPECTION, NORMOCEPHALIC





- Eye Exam


Eye Exam: EOMI, Normal appearance, PERRL


Pupil Exam: NORMAL ACCOMODATION, PERRL





- ENT Exam


ENT Exam: Mucous Membranes Moist, Normal Exam





- Neck Exam


Neck Exam: Full ROM, Normal Inspection.  absent: Lymphadenopathy





- Respiratory Exam


Respiratory Exam: Clear to Ausculation Bilateral, NORMAL BREATHING PATTERN





- Cardiovascular Exam


Cardiovascular Exam: REGULAR RHYTHM, RRR, +S1, +S2.  absent: Murmur





- GI/Abdominal Exam


GI & Abdominal Exam: Soft, Normal Bowel Sounds.  absent: Tenderness





- Extremities Exam


Extremities Exam: Full ROM, Normal Capillary Refill, Normal Inspection.  absent

: Joint Swelling, Pedal Edema





- Back Exam


Back Exam: NORMAL INSPECTION





- Neurological Exam


Neurological Exam: Alert, Awake, CN II-XII Intact, Normal Gait, Oriented x3





- Psychiatric Exam


Psychiatric exam: Normal Affect, Normal Mood





- Skin


Skin Exam: Dry, Intact, Normal Color, Warm





Assessment and Plan


(1) DVT prophylaxis


Status: Chronic   





(2) Scrotal edema


Status: Chronic   





(3) CAD (coronary artery disease)


Status: Chronic   





(4) Smoking


Status: Chronic   





(5) Low back pain


Status: Chronic   





(6) Prediabetes


Status: Chronic   





(7) Neurocognitive disorder


Status: Chronic

## 2018-04-04 RX ADMIN — DIVALPROEX SODIUM SCH MG: 250 TABLET, DELAYED RELEASE ORAL at 16:42

## 2018-04-04 RX ADMIN — DIVALPROEX SODIUM SCH MG: 250 TABLET, DELAYED RELEASE ORAL at 08:31

## 2018-04-04 NOTE — CP.PCM.PN
Subjective





- Date & Time of Evaluation


Date of Evaluation: 04/04/18


Time of Evaluation: 07:20





- Subjective


Subjective: 





s/o no f/c, n/v/d. no other complaints.calm and cooperative. no abd pain, 


a/p cont plan, cont placement, cont all medical management, cont psych meds


remains unable to make medical decisians for self. pending state disability/

guardianship/placement


bw noted, pt to be oob >50% 





Objective





- Vital Signs/Intake and Output


Vital Signs (last 24 hours): 


 











Temp Pulse Resp BP Pulse Ox


 


 98.5 F   70   18   130/82   97 


 


 04/04/18 00:11  04/04/18 00:11  04/04/18 00:11  04/04/18 00:11  04/04/18 00:11











- Medications


Medications: 


 Current Medications





Atorvastatin Calcium (Lipitor)  20 mg PO HS [facility]


   Last Admin: 04/03/18 21:18 Dose:  20 mg


Divalproex Sodium (Depakote Dr(*Bid*))  500 mg PO BID [facility]


   Last Admin: 04/03/18 16:30 Dose:  500 mg


Enalapril Maleate (Vasotec)  10 mg PO DAILY [facility]


   Last Admin: 04/03/18 08:52 Dose:  10 mg


Fenofibrate (Tricor)  145 mg PO DAILY [facility]


   Last Admin: 04/03/18 08:52 Dose:  145 mg


Metoprolol Tartrate (Lopressor)  50 mg PO Q12 [facility]


   Last Admin: 04/03/18 21:18 Dose:  50 mg


Quetiapine Fumarate (Seroquel)  50 mg PO Q12 [facility]


   Last Admin: 04/03/18 21:17 Dose:  50 mg











- Labs


Labs: 


 





 03/30/18 05:30 





 03/30/18 05:30 





 











PT  11.2 SECONDS (9.4-12.0)   12/14/15  05:20    


 


INR  1.05  (0.89-1.20)   12/14/15  05:20    


 


APTT  36.2 SECONDS (23.1-32.0)  H  12/14/15  05:20    














- Constitutional


Appears: Well, Non-toxic, No Acute Distress





- Head Exam


Head Exam: ATRAUMATIC, NORMAL INSPECTION, NORMOCEPHALIC





- Eye Exam


Eye Exam: EOMI, Normal appearance, PERRL


Pupil Exam: NORMAL ACCOMODATION, PERRL





- ENT Exam


ENT Exam: Mucous Membranes Moist, Normal Exam





- Neck Exam


Neck Exam: Full ROM, Normal Inspection.  absent: Lymphadenopathy





- Respiratory Exam


Respiratory Exam: Clear to Ausculation Bilateral, NORMAL BREATHING PATTERN





- Cardiovascular Exam


Cardiovascular Exam: REGULAR RHYTHM, RRR, +S1, +S2.  absent: Murmur





- GI/Abdominal Exam


GI & Abdominal Exam: Soft, Normal Bowel Sounds.  absent: Tenderness





- Extremities Exam


Extremities Exam: Full ROM, Normal Capillary Refill, Normal Inspection.  absent

: Joint Swelling, Pedal Edema





- Back Exam


Back Exam: NORMAL INSPECTION





- Neurological Exam


Neurological Exam: Alert, Awake, CN II-XII Intact, Normal Gait, Oriented x3





- Psychiatric Exam


Psychiatric exam: Normal Affect, Normal Mood





- Skin


Skin Exam: Dry, Intact, Normal Color, Warm





Assessment and Plan


(1) DVT prophylaxis


Status: Chronic   





(2) Scrotal edema


Status: Chronic   





(3) CAD (coronary artery disease)


Status: Chronic   





(4) Smoking


Status: Chronic   





(5) Low back pain


Status: Chronic   





(6) Prediabetes


Status: Chronic   





(7) Neurocognitive disorder


Status: Chronic

## 2018-04-05 RX ADMIN — DIVALPROEX SODIUM SCH MG: 250 TABLET, DELAYED RELEASE ORAL at 10:47

## 2018-04-05 RX ADMIN — DIVALPROEX SODIUM SCH MG: 250 TABLET, DELAYED RELEASE ORAL at 18:07

## 2018-04-05 NOTE — CP.PCM.PN
Subjective





- Date & Time of Evaluation


Date of Evaluation: 04/05/18


Time of Evaluation: 07:29





- Subjective


Subjective: 





s/o no f/c, n/v/d. no other complaints.calm and cooperative. no abd pain, 


a/p cont plan, cont placement, cont all medical management, cont psych meds


remains unable to make medical decisians for self. pending state disability/

guardianship/placement


bw noted, pt to be oob >50% 





Objective





- Vital Signs/Intake and Output


Vital Signs (last 24 hours): 


 











Temp Pulse Resp BP Pulse Ox


 


 98.4 F   80   20   98/70 L  96 


 


 04/04/18 23:53  04/04/18 23:53  04/04/18 23:53  04/04/18 23:53  04/04/18 23:53











- Medications


Medications: 


 Current Medications





Atorvastatin Calcium (Lipitor)  20 mg PO HS [facility]


   Last Admin: 04/04/18 21:20 Dose:  20 mg


Divalproex Sodium (Depakote Dr(*Bid*))  500 mg PO BID [facility]


   Last Admin: 04/04/18 16:42 Dose:  500 mg


Enalapril Maleate (Vasotec)  10 mg PO DAILY [facility]


   Last Admin: 04/04/18 08:31 Dose:  10 mg


Fenofibrate (Tricor)  145 mg PO DAILY [facility]


   Last Admin: 04/04/18 08:31 Dose:  145 mg


Metoprolol Tartrate (Lopressor)  50 mg PO Q12 [facility]


   Last Admin: 04/04/18 21:20 Dose:  50 mg


Quetiapine Fumarate (Seroquel)  50 mg PO Q12 [facility]


   Last Admin: 04/04/18 21:20 Dose:  50 mg











- Labs


Labs: 


 





 03/30/18 05:30 





 03/30/18 05:30 





 











PT  11.2 SECONDS (9.4-12.0)   12/14/15  05:20    


 


INR  1.05  (0.89-1.20)   12/14/15  05:20    


 


APTT  36.2 SECONDS (23.1-32.0)  H  12/14/15  05:20    














- Constitutional


Appears: Well, Non-toxic, No Acute Distress





- Head Exam


Head Exam: ATRAUMATIC, NORMAL INSPECTION, NORMOCEPHALIC





- Eye Exam


Eye Exam: EOMI, Normal appearance, PERRL


Pupil Exam: NORMAL ACCOMODATION, PERRL





- ENT Exam


ENT Exam: Mucous Membranes Moist, Normal Exam





- Neck Exam


Neck Exam: Full ROM, Normal Inspection.  absent: Lymphadenopathy





- Respiratory Exam


Respiratory Exam: Clear to Ausculation Bilateral, NORMAL BREATHING PATTERN





- Cardiovascular Exam


Cardiovascular Exam: REGULAR RHYTHM, RRR, +S1, +S2.  absent: Murmur





- GI/Abdominal Exam


GI & Abdominal Exam: Soft, Normal Bowel Sounds.  absent: Tenderness





- Extremities Exam


Extremities Exam: Full ROM, Normal Capillary Refill, Normal Inspection.  absent

: Joint Swelling, Pedal Edema





- Back Exam


Back Exam: NORMAL INSPECTION





- Neurological Exam


Neurological Exam: Alert, Awake, CN II-XII Intact, Normal Gait, Oriented x3





- Psychiatric Exam


Psychiatric exam: Normal Affect, Normal Mood





- Skin


Skin Exam: Dry, Intact, Normal Color, Warm





Assessment and Plan


(1) DVT prophylaxis


Status: Chronic   





(2) Scrotal edema


Status: Chronic   





(3) CAD (coronary artery disease)


Status: Chronic   





(4) Smoking


Status: Chronic   





(5) Low back pain


Status: Chronic   





(6) Prediabetes


Status: Chronic   





(7) Neurocognitive disorder


Status: Chronic

## 2018-04-06 RX ADMIN — DIVALPROEX SODIUM SCH MG: 250 TABLET, DELAYED RELEASE ORAL at 10:17

## 2018-04-06 RX ADMIN — DIVALPROEX SODIUM SCH MG: 250 TABLET, DELAYED RELEASE ORAL at 18:21

## 2018-04-07 RX ADMIN — DIVALPROEX SODIUM SCH MG: 250 TABLET, DELAYED RELEASE ORAL at 16:49

## 2018-04-07 RX ADMIN — DIVALPROEX SODIUM SCH MG: 250 TABLET, DELAYED RELEASE ORAL at 08:19

## 2018-04-07 NOTE — CP.PCM.PN
Subjective





- Date & Time of Evaluation


Date of Evaluation: 04/07/18


Time of Evaluation: 09:30





- Subjective


Subjective: 





s/o no f/c, n/v/d. no other complaints.calm and cooperative. no abd pain, 


a/p cont plan, cont placement, cont all medical management, cont psych meds


remains unable to make medical decisians for self. pending state disability/

guardianship/placement


bw noted, pt to be oob >50% 





Objective





- Vital Signs/Intake and Output


Vital Signs (last 24 hours): 


 











Temp Pulse Resp BP Pulse Ox


 


 97.6 F   63   20   117/70   95 


 


 04/07/18 00:22  04/07/18 08:20  04/07/18 00:22  04/07/18 08:20  04/07/18 00:22











- Medications


Medications: 


 Current Medications





Atorvastatin Calcium (Lipitor)  20 mg PO HS [facility]


   Last Admin: 04/06/18 21:12 Dose:  20 mg


Divalproex Sodium (Depakote Dr(*Bid*))  500 mg PO BID [facility]


   Last Admin: 04/07/18 08:19 Dose:  500 mg


Enalapril Maleate (Vasotec)  10 mg PO DAILY [facility]


   Last Admin: 04/07/18 08:20 Dose:  10 mg


Fenofibrate (Tricor)  145 mg PO DAILY [facility]


   Last Admin: 04/07/18 08:20 Dose:  145 mg


Metoprolol Tartrate (Lopressor)  50 mg PO Q12 [facility]


   Last Admin: 04/07/18 08:20 Dose:  50 mg


Quetiapine Fumarate (Seroquel)  50 mg PO Q12 [facility]


   Last Admin: 04/07/18 08:20 Dose:  50 mg











- Labs


Labs: 


 





 03/30/18 05:30 





 03/30/18 05:30 





 











PT  11.2 SECONDS (9.4-12.0)   12/14/15  05:20    


 


INR  1.05  (0.89-1.20)   12/14/15  05:20    


 


APTT  36.2 SECONDS (23.1-32.0)  H  12/14/15  05:20    














- Constitutional


Appears: Well, Non-toxic, No Acute Distress





- Head Exam


Head Exam: ATRAUMATIC, NORMAL INSPECTION, NORMOCEPHALIC





- Eye Exam


Eye Exam: EOMI, Normal appearance, PERRL


Pupil Exam: NORMAL ACCOMODATION, PERRL





- ENT Exam


ENT Exam: Mucous Membranes Moist, Normal Exam





- Neck Exam


Neck Exam: Full ROM, Normal Inspection.  absent: Lymphadenopathy





- Respiratory Exam


Respiratory Exam: Clear to Ausculation Bilateral, NORMAL BREATHING PATTERN





- Cardiovascular Exam


Cardiovascular Exam: REGULAR RHYTHM, RRR, +S1, +S2.  absent: Murmur





- GI/Abdominal Exam


GI & Abdominal Exam: Soft, Normal Bowel Sounds.  absent: Tenderness





- Extremities Exam


Extremities Exam: Calf Tenderness, Full ROM, Normal Capillary Refill, Normal 

Inspection.  absent: Joint Swelling, Pedal Edema





- Back Exam


Back Exam: NORMAL INSPECTION





- Neurological Exam


Neurological Exam: Alert, Awake, CN II-XII Intact, Normal Gait, Oriented x3





- Psychiatric Exam


Psychiatric exam: Normal Affect, Normal Mood





- Skin


Skin Exam: Dry, Intact, Normal Color, Warm





Assessment and Plan


(1) DVT prophylaxis


Status: Chronic   





(2) Scrotal edema


Status: Chronic   





(3) CAD (coronary artery disease)


Status: Chronic   





(4) Smoking


Status: Chronic   





(5) Low back pain


Status: Chronic   





(6) Prediabetes


Status: Chronic   





(7) Neurocognitive disorder


Status: Chronic

## 2018-04-07 NOTE — CP.PCM.PN
Subjective





- Date & Time of Evaluation


Date of Evaluation: 04/06/18


Time of Evaluation: 07:00





- Subjective


Subjective: 





s/o no f/c, n/v/d. no other complaints.calm and cooperative. no abd pain, 


a/p cont plan, cont placement, cont all medical management, cont psych meds


remains unable to make medical decisians for self. pending state disability/

guardianship/placement


bw noted, pt to be oob >50% 





Objective





- Vital Signs/Intake and Output


Vital Signs (last 24 hours): 


 











Temp Pulse Resp BP Pulse Ox


 


 97.6 F   63   20   117/70   95 


 


 04/07/18 00:22  04/07/18 08:20  04/07/18 00:22  04/07/18 08:20  04/07/18 00:22











- Medications


Medications: 


 Current Medications





Atorvastatin Calcium (Lipitor)  20 mg PO HS [facility]


   Last Admin: 04/06/18 21:12 Dose:  20 mg


Divalproex Sodium (Depakote Dr(*Bid*))  500 mg PO BID [facility]


   Last Admin: 04/07/18 08:19 Dose:  500 mg


Enalapril Maleate (Vasotec)  10 mg PO DAILY [facility]


   Last Admin: 04/07/18 08:20 Dose:  10 mg


Fenofibrate (Tricor)  145 mg PO DAILY [facility]


   Last Admin: 04/07/18 08:20 Dose:  145 mg


Metoprolol Tartrate (Lopressor)  50 mg PO Q12 [facility]


   Last Admin: 04/07/18 08:20 Dose:  50 mg


Quetiapine Fumarate (Seroquel)  50 mg PO Q12 [facility]


   Last Admin: 04/07/18 08:20 Dose:  50 mg











- Labs


Labs: 


 





 03/30/18 05:30 





 03/30/18 05:30 





 











PT  11.2 SECONDS (9.4-12.0)   12/14/15  05:20    


 


INR  1.05  (0.89-1.20)   12/14/15  05:20    


 


APTT  36.2 SECONDS (23.1-32.0)  H  12/14/15  05:20    














- Constitutional


Appears: Well, Non-toxic, No Acute Distress





- Head Exam


Head Exam: ATRAUMATIC, NORMAL INSPECTION, NORMOCEPHALIC





- Eye Exam


Eye Exam: EOMI, Normal appearance, PERRL


Pupil Exam: NORMAL ACCOMODATION, PERRL





- ENT Exam


ENT Exam: Mucous Membranes Moist, Normal Exam





- Neck Exam


Neck Exam: Full ROM, Normal Inspection.  absent: Lymphadenopathy





- Respiratory Exam


Respiratory Exam: Clear to Ausculation Bilateral, NORMAL BREATHING PATTERN





- Cardiovascular Exam


Cardiovascular Exam: REGULAR RHYTHM, RRR, +S1, +S2.  absent: Murmur





- GI/Abdominal Exam


GI & Abdominal Exam: Soft, Normal Bowel Sounds.  absent: Tenderness





- Extremities Exam


Extremities Exam: Full ROM, Normal Capillary Refill, Normal Inspection.  absent

: Joint Swelling, Pedal Edema





- Back Exam


Back Exam: NORMAL INSPECTION





- Neurological Exam


Neurological Exam: Alert, Awake, CN II-XII Intact, Normal Gait, Oriented x3





- Psychiatric Exam


Psychiatric exam: Normal Affect, Normal Mood





- Skin


Skin Exam: Dry, Intact, Normal Color, Warm





Assessment and Plan


(1) DVT prophylaxis


Status: Chronic   





(2) Scrotal edema


Status: Chronic   





(3) CAD (coronary artery disease)


Status: Chronic   





(4) Smoking


Status: Chronic   





(5) Low back pain


Status: Chronic   





(6) Prediabetes


Status: Chronic   





(7) Neurocognitive disorder


Status: Chronic

## 2018-04-08 RX ADMIN — DIVALPROEX SODIUM SCH MG: 250 TABLET, DELAYED RELEASE ORAL at 11:38

## 2018-04-08 RX ADMIN — DIVALPROEX SODIUM SCH MG: 250 TABLET, DELAYED RELEASE ORAL at 17:36

## 2018-04-08 NOTE — CP.PCM.PN
Subjective





- Date & Time of Evaluation


Date of Evaluation: 04/08/19


Time of Evaluation: 09:00





- Subjective


Subjective: 








s/o no f/c, n/v/d. no other complaints.calm and cooperative. no abd pain, 


a/p cont plan, cont placement, cont all medical management, cont psych meds


remains unable to make medical decisians for self. pending state disability/

guardianship/placement


bw noted, pt to be oob >50% 





Objective





- Vital Signs/Intake and Output


Vital Signs (last 24 hours): 


 











Temp Pulse Resp BP Pulse Ox


 


 97 F L  67   18   101/67   95 


 


 04/08/18 08:52  04/08/18 08:52  04/08/18 08:52  04/08/18 08:52  04/08/18 08:52











- Medications


Medications: 


 Current Medications





Atorvastatin Calcium (Lipitor)  20 mg PO HS [facility]


   Last Admin: 04/07/18 21:47 Dose:  20 mg


Divalproex Sodium (Depakote Dr(*Bid*))  500 mg PO BID [facility]


   Last Admin: 04/07/18 16:49 Dose:  500 mg


Enalapril Maleate (Vasotec)  10 mg PO DAILY [facility]


   Last Admin: 04/08/18 08:37 Dose:  10 mg


Fenofibrate (Tricor)  145 mg PO DAILY [facility]


   Last Admin: 04/08/18 08:36 Dose:  145 mg


Metoprolol Tartrate (Lopressor)  50 mg PO Q12 [facility]


   Last Admin: 04/08/18 08:36 Dose:  50 mg


Quetiapine Fumarate (Seroquel)  50 mg PO Q12 [facility]


   Last Admin: 04/08/18 08:37 Dose:  50 mg











- Labs


Labs: 


 





 03/30/18 05:30 





 03/30/18 05:30 





 











PT  11.2 SECONDS (9.4-12.0)   12/14/15  05:20    


 


INR  1.05  (0.89-1.20)   12/14/15  05:20    


 


APTT  36.2 SECONDS (23.1-32.0)  H  12/14/15  05:20    














- Constitutional


Appears: Well, Non-toxic, No Acute Distress





- Head Exam


Head Exam: ATRAUMATIC, NORMAL INSPECTION, NORMOCEPHALIC





- Eye Exam


Eye Exam: EOMI, Normal appearance, PERRL


Pupil Exam: NORMAL ACCOMODATION, PERRL





- ENT Exam


ENT Exam: Mucous Membranes Moist, Normal Exam





- Neck Exam


Neck Exam: Full ROM, Normal Inspection.  absent: Lymphadenopathy





- Respiratory Exam


Respiratory Exam: Clear to Ausculation Bilateral, NORMAL BREATHING PATTERN





- Cardiovascular Exam


Cardiovascular Exam: REGULAR RHYTHM, RRR, +S1, +S2.  absent: Murmur





- GI/Abdominal Exam


GI & Abdominal Exam: Soft, Normal Bowel Sounds.  absent: Tenderness





- Extremities Exam


Extremities Exam: Full ROM, Normal Capillary Refill, Normal Inspection.  absent

: Joint Swelling, Pedal Edema





- Back Exam


Back Exam: NORMAL INSPECTION





- Neurological Exam


Neurological Exam: Alert, Awake, CN II-XII Intact, Normal Gait, Oriented x3





- Psychiatric Exam


Psychiatric exam: Normal Affect, Normal Mood





- Skin


Skin Exam: Dry, Intact, Normal Color, Warm





Assessment and Plan


(1) DVT prophylaxis


Status: Chronic   





(2) Scrotal edema


Status: Chronic   





(3) CAD (coronary artery disease)


Status: Chronic   





(4) Smoking


Status: Chronic   





(5) Low back pain


Status: Chronic   





(6) Prediabetes


Status: Chronic   





(7) Neurocognitive disorder


Status: Chronic

## 2018-04-09 RX ADMIN — DIVALPROEX SODIUM SCH MG: 250 TABLET, DELAYED RELEASE ORAL at 17:14

## 2018-04-09 RX ADMIN — DIVALPROEX SODIUM SCH MG: 250 TABLET, DELAYED RELEASE ORAL at 10:10

## 2018-04-09 NOTE — CP.PCM.PN
Subjective





- Date & Time of Evaluation


Date of Evaluation: 04/09/18


Time of Evaluation: 07:30





- Subjective


Subjective: 





s/o no f/c, n/v/d. no other complaints.calm and cooperative. no abd pain, 


a/p cont plan, cont placement, cont all medical management, cont psych meds


remains unable to make medical decisians for self. pending state disability/

guardianship/placement


bw noted, pt to be oob >50% 





Objective





- Vital Signs/Intake and Output


Vital Signs (last 24 hours): 


 











Temp Pulse Resp BP Pulse Ox


 


 98.1 F   77   19   107/71   98 


 


 04/09/18 00:00  04/09/18 00:00  04/09/18 00:00  04/09/18 00:00  04/09/18 00:00











- Medications


Medications: 


 Current Medications





Atorvastatin Calcium (Lipitor)  20 mg PO HS [facility]


   Last Admin: 04/08/18 21:38 Dose:  20 mg


Divalproex Sodium (Depakote Dr(*Bid*))  500 mg PO BID [facility]


   Last Admin: 04/08/18 17:36 Dose:  500 mg


Enalapril Maleate (Vasotec)  10 mg PO DAILY [facility]


   Last Admin: 04/08/18 08:37 Dose:  10 mg


Fenofibrate (Tricor)  145 mg PO DAILY [facility]


   Last Admin: 04/08/18 08:36 Dose:  145 mg


Metoprolol Tartrate (Lopressor)  50 mg PO Q12 [facility]


   Last Admin: 04/08/18 21:38 Dose:  50 mg


Quetiapine Fumarate (Seroquel)  50 mg PO Q12 [facility]


   Last Admin: 04/08/18 21:38 Dose:  50 mg











- Labs


Labs: 


 





 03/30/18 05:30 





 03/30/18 05:30 





 











PT  11.2 SECONDS (9.4-12.0)   12/14/15  05:20    


 


INR  1.05  (0.89-1.20)   12/14/15  05:20    


 


APTT  36.2 SECONDS (23.1-32.0)  H  12/14/15  05:20    














- Constitutional


Appears: Well, Non-toxic, No Acute Distress





- Head Exam


Head Exam: ATRAUMATIC, NORMAL INSPECTION, NORMOCEPHALIC





- Eye Exam


Eye Exam: EOMI, Normal appearance, PERRL


Pupil Exam: NORMAL ACCOMODATION, PERRL





- ENT Exam


ENT Exam: Mucous Membranes Moist, Normal Exam





- Neck Exam


Neck Exam: Full ROM, Normal Inspection.  absent: Lymphadenopathy





- Respiratory Exam


Respiratory Exam: Clear to Ausculation Bilateral, NORMAL BREATHING PATTERN





- Cardiovascular Exam


Cardiovascular Exam: REGULAR RHYTHM, RRR, +S1, +S2.  absent: Murmur





- GI/Abdominal Exam


GI & Abdominal Exam: Soft, Normal Bowel Sounds.  absent: Tenderness





- Extremities Exam


Extremities Exam: Full ROM, Normal Capillary Refill, Normal Inspection.  absent

: Joint Swelling, Pedal Edema





- Back Exam


Back Exam: NORMAL INSPECTION





- Neurological Exam


Neurological Exam: Alert, Awake, CN II-XII Intact, Normal Gait, Oriented x3





- Psychiatric Exam


Psychiatric exam: Normal Affect, Normal Mood





- Skin


Skin Exam: Dry, Intact, Normal Color, Warm





Assessment and Plan


(1) DVT prophylaxis


Status: Chronic   





(2) Scrotal edema


Status: Chronic   





(3) CAD (coronary artery disease)


Status: Chronic   





(4) Smoking


Status: Chronic   





(5) Low back pain


Status: Chronic   





(6) Prediabetes


Status: Chronic   





(7) Neurocognitive disorder


Status: Chronic

## 2018-04-10 RX ADMIN — DIVALPROEX SODIUM SCH MG: 250 TABLET, DELAYED RELEASE ORAL at 08:22

## 2018-04-10 RX ADMIN — DIVALPROEX SODIUM SCH MG: 250 TABLET, DELAYED RELEASE ORAL at 15:59

## 2018-04-11 ENCOUNTER — HOSPITAL ENCOUNTER (INPATIENT)
Dept: HOSPITAL 14 - H.ER | Age: 58
Discharge: NURSING, REHAB OR LONG-TERM CARE | DRG: 121 | End: 2018-04-11
Attending: FAMILY MEDICINE | Admitting: FAMILY MEDICINE
Payer: MEDICAID

## 2018-04-11 VITALS — HEART RATE: 81 BPM | DIASTOLIC BLOOD PRESSURE: 73 MMHG | SYSTOLIC BLOOD PRESSURE: 105 MMHG

## 2018-04-11 VITALS — RESPIRATION RATE: 19 BRPM | TEMPERATURE: 98.2 F | OXYGEN SATURATION: 96 %

## 2018-04-11 VITALS — BODY MASS INDEX: 28.6 KG/M2

## 2018-04-11 DIAGNOSIS — G40.909: ICD-10-CM

## 2018-04-11 DIAGNOSIS — K52.9: ICD-10-CM

## 2018-04-11 DIAGNOSIS — E11.65: ICD-10-CM

## 2018-04-11 DIAGNOSIS — I25.10: ICD-10-CM

## 2018-04-11 DIAGNOSIS — B96.1: ICD-10-CM

## 2018-04-11 DIAGNOSIS — R45.1: ICD-10-CM

## 2018-04-11 DIAGNOSIS — I48.91: ICD-10-CM

## 2018-04-11 DIAGNOSIS — I21.4: Primary | ICD-10-CM

## 2018-04-11 DIAGNOSIS — G93.1: ICD-10-CM

## 2018-04-11 DIAGNOSIS — K57.92: ICD-10-CM

## 2018-04-11 DIAGNOSIS — N39.0: ICD-10-CM

## 2018-04-11 DIAGNOSIS — N50.89: ICD-10-CM

## 2018-04-11 DIAGNOSIS — I47.2: ICD-10-CM

## 2018-04-11 DIAGNOSIS — T41.295A: ICD-10-CM

## 2018-04-11 DIAGNOSIS — J96.01: ICD-10-CM

## 2018-04-11 DIAGNOSIS — F99: ICD-10-CM

## 2018-04-11 DIAGNOSIS — I46.2: ICD-10-CM

## 2018-04-11 DIAGNOSIS — E87.2: ICD-10-CM

## 2018-04-11 DIAGNOSIS — Z78.1: ICD-10-CM

## 2018-04-11 DIAGNOSIS — L85.3: ICD-10-CM

## 2018-04-11 DIAGNOSIS — J69.0: ICD-10-CM

## 2018-04-11 DIAGNOSIS — L60.3: ICD-10-CM

## 2018-04-11 DIAGNOSIS — F17.210: ICD-10-CM

## 2018-04-11 DIAGNOSIS — Z59.0: ICD-10-CM

## 2018-04-11 DIAGNOSIS — M54.5: ICD-10-CM

## 2018-04-11 DIAGNOSIS — R31.9: ICD-10-CM

## 2018-04-11 DIAGNOSIS — R00.1: ICD-10-CM

## 2018-04-11 DIAGNOSIS — Z23: ICD-10-CM

## 2018-04-11 DIAGNOSIS — F05: ICD-10-CM

## 2018-04-11 DIAGNOSIS — R13.10: ICD-10-CM

## 2018-04-11 RX ADMIN — DIVALPROEX SODIUM SCH MG: 250 TABLET, DELAYED RELEASE ORAL at 09:04

## 2018-04-11 SDOH — ECONOMIC STABILITY - HOUSING INSECURITY: HOMELESSNESS: Z59.0

## 2018-04-11 NOTE — CP.PCM.PN
Subjective





- Date & Time of Evaluation


Date of Evaluation: 04/11/18


Time of Evaluation: 07:50





- Subjective


Subjective: 





s/o no f/c, n/v/d. no other complaints.calm and cooperative. no abd pain, 


a/p cont plan, cont placement, cont all medical management, cont psych meds


remains unable to make medical decisians for self. pending state disability/

guardianship/placement


bw noted, pt to be oob >50% 





Objective





- Vital Signs/Intake and Output


Vital Signs (last 24 hours): 


 











Temp Pulse Resp BP Pulse Ox


 


 98.2 F   84   19   100/65   96 


 


 04/11/18 00:00  04/11/18 00:00  04/11/18 00:00  04/11/18 00:00  04/11/18 00:00











- Medications


Medications: 


 Current Medications





Atorvastatin Calcium (Lipitor)  20 mg PO HS [facility]


   Last Admin: 04/10/18 21:58 Dose:  20 mg


Divalproex Sodium (Depakote Dr(*Bid*))  500 mg PO BID [facility]


   Last Admin: 04/10/18 15:59 Dose:  500 mg


Enalapril Maleate (Vasotec)  10 mg PO DAILY [facility]


   Last Admin: 04/10/18 08:23 Dose:  10 mg


Fenofibrate (Tricor)  145 mg PO DAILY [facility]


   Last Admin: 04/10/18 08:23 Dose:  145 mg


Metoprolol Tartrate (Lopressor)  50 mg PO Q12 [facility]


   Last Admin: 04/10/18 21:58 Dose:  50 mg


Quetiapine Fumarate (Seroquel)  50 mg PO Q12 [facility]


   Last Admin: 04/10/18 21:58 Dose:  50 mg











- Labs


Labs: 


 





 03/30/18 05:30 





 03/30/18 05:30 





 











PT  11.2 SECONDS (9.4-12.0)   12/14/15  05:20    


 


INR  1.05  (0.89-1.20)   12/14/15  05:20    


 


APTT  36.2 SECONDS (23.1-32.0)  H  12/14/15  05:20    














- Constitutional


Appears: Well, Non-toxic, No Acute Distress





- Head Exam


Head Exam: ATRAUMATIC, NORMAL INSPECTION, NORMOCEPHALIC





- Eye Exam


Eye Exam: EOMI, Normal appearance, PERRL


Pupil Exam: NORMAL ACCOMODATION, PERRL





- ENT Exam


ENT Exam: Mucous Membranes Moist, Normal Exam





- Neck Exam


Neck Exam: Full ROM, Normal Inspection.  absent: Lymphadenopathy





- Respiratory Exam


Respiratory Exam: Clear to Ausculation Bilateral, NORMAL BREATHING PATTERN





- Cardiovascular Exam


Cardiovascular Exam: REGULAR RHYTHM, RRR, +S1, +S2.  absent: Murmur





- GI/Abdominal Exam


GI & Abdominal Exam: Soft, Normal Bowel Sounds.  absent: Tenderness





- Extremities Exam


Extremities Exam: Full ROM, Normal Capillary Refill, Normal Inspection.  absent

: Joint Swelling, Pedal Edema





- Back Exam


Back Exam: NORMAL INSPECTION





- Neurological Exam


Neurological Exam: Alert, Awake, CN II-XII Intact, Normal Gait, Oriented x3





- Psychiatric Exam


Psychiatric exam: Normal Affect, Normal Mood





- Skin


Skin Exam: Dry, Intact, Normal Color, Warm





Assessment and Plan


(1) DVT prophylaxis


Status: Chronic   





(2) Scrotal edema


Status: Chronic   





(3) CAD (coronary artery disease)


Status: Chronic   





(4) Smoking


Status: Chronic   





(5) Low back pain


Status: Chronic   





(6) Prediabetes


Status: Chronic   





(7) Neurocognitive disorder


Status: Chronic

## 2018-05-29 ENCOUNTER — HOSPITAL ENCOUNTER (INPATIENT)
Dept: HOSPITAL 14 - H.ER | Age: 58
LOS: 8 days | Discharge: NURSING, REHAB OR LONG-TERM CARE | DRG: 429 | End: 2018-06-06
Attending: PSYCHIATRY & NEUROLOGY | Admitting: PSYCHIATRY & NEUROLOGY
Payer: MEDICAID

## 2018-05-29 VITALS — BODY MASS INDEX: 28.6 KG/M2

## 2018-05-29 DIAGNOSIS — F03.91: Primary | ICD-10-CM

## 2018-05-29 DIAGNOSIS — R44.0: ICD-10-CM

## 2018-05-29 DIAGNOSIS — Z79.899: ICD-10-CM

## 2018-05-29 DIAGNOSIS — R56.9: ICD-10-CM

## 2018-05-29 DIAGNOSIS — I25.10: ICD-10-CM

## 2018-05-29 DIAGNOSIS — Z87.891: ICD-10-CM

## 2018-05-29 DIAGNOSIS — Z79.82: ICD-10-CM

## 2018-05-29 DIAGNOSIS — E11.9: ICD-10-CM

## 2018-05-29 DIAGNOSIS — I10: ICD-10-CM

## 2018-05-29 DIAGNOSIS — I48.91: ICD-10-CM

## 2018-05-29 LAB
ALBUMIN SERPL-MCNC: 4.2 G/DL (ref 3.5–5)
ALBUMIN/GLOB SERPL: 1 {RATIO} (ref 1–2.1)
ALT SERPL-CCNC: 34 U/L (ref 21–72)
AST SERPL-CCNC: 28 U/L (ref 17–59)
BASOPHILS # BLD AUTO: 0 K/UL (ref 0–0.2)
BASOPHILS NFR BLD: 0.3 % (ref 0–2)
BILIRUB UR-MCNC: NEGATIVE MG/DL
BUN SERPL-MCNC: 26 MG/DL (ref 9–20)
CALCIUM SERPL-MCNC: 9.4 MG/DL (ref 8.4–10.2)
COLOR UR: YELLOW
EOSINOPHIL # BLD AUTO: 0.4 K/UL (ref 0–0.7)
EOSINOPHIL NFR BLD: 4.1 % (ref 0–4)
ERYTHROCYTE [DISTWIDTH] IN BLOOD BY AUTOMATED COUNT: 13.4 % (ref 11.5–14.5)
GFR NON-AFRICAN AMERICAN: 57
GLUCOSE UR STRIP-MCNC: (no result) MG/DL
HGB BLD-MCNC: 13 G/DL (ref 12–18)
LEUKOCYTE ESTERASE UR-ACNC: (no result) LEU/UL
LYMPHOCYTES # BLD AUTO: 3.1 K/UL (ref 1–4.3)
LYMPHOCYTES NFR BLD AUTO: 34.6 % (ref 20–40)
MCH RBC QN AUTO: 30.9 PG (ref 27–31)
MCHC RBC AUTO-ENTMCNC: 33.6 G/DL (ref 33–37)
MCV RBC AUTO: 92.2 FL (ref 80–94)
MONOCYTES # BLD: 0.8 K/UL (ref 0–0.8)
MONOCYTES NFR BLD: 8.5 % (ref 0–10)
NEUTROPHILS # BLD: 4.8 K/UL (ref 1.8–7)
NEUTROPHILS NFR BLD AUTO: 52.5 % (ref 50–75)
NRBC BLD AUTO-RTO: 0.1 % (ref 0–0)
PH UR STRIP: 5 [PH] (ref 5–8)
PLATELET # BLD: 296 K/UL (ref 130–400)
PMV BLD AUTO: 7.7 FL (ref 7.2–11.7)
PROT UR STRIP-MCNC: NEGATIVE MG/DL
RBC # BLD AUTO: 4.19 MIL/UL (ref 4.4–5.9)
RBC # UR STRIP: NEGATIVE /UL
SP GR UR STRIP: 1.03 (ref 1–1.03)
SQUAMOUS EPITHIAL: 1 /HPF (ref 0–5)
URINE CLARITY: (no result)
UROBILINOGEN UR-MCNC: (no result) MG/DL (ref 0.2–1)
WBC # BLD AUTO: 9 K/UL (ref 4.8–10.8)

## 2018-05-29 NOTE — CT
EXAM:

  CT Head Without Intravenous Contrast



CLINICAL HISTORY:

  58 years old, male; Signs and symptoms; Other: Hallucinations; Prior surgery; 

Surgery date: 6+ months; Surgery type: Craniotomy



TECHNIQUE:

  Axial computed tomography images of the head/brain without intravenous 

contrast.  All CT scans at this facility use one or more dose reduction 

techniques, viz.: automated exposure control; ma/kV adjustment per patient size 

(including targeted exams where dose is matched to indication; i.e. head); or 

iterative reconstruction technique.

  Coronal and sagittal reformatted images were created and reviewed.



COMPARISON:

  CT - HEAD W/O CONTRAST 2015-12-08 10:22



FINDINGS:

  Brain:  Mild-to-moderate atrophy.  No intracranial hemorrhage.  Dural 

thickening along right frontal convexity.  No mass.  No definite edema.

  Ventricles:  No hydrocephalus.

  Bones/joints:  No acute fracture.  Craniotomy.

  Soft tissues:  Unremarkable.

  Vasculature:  Minimal atherosclerotic disease of intracranial arteries.

  Sinuses:  Scattered minimal mucosal thickening of ethmoid sinuses.

  Mastoid air cells:  No mastoid effusion.

  Orbits:  Unremarkable as visualized.



IMPRESSION:     

1.  No definite acute intracranial abnormality.

2.  Incidental/non-acute findings are described above.

## 2018-05-29 NOTE — ED PDOC
- Laboratory Results


Result Diagrams: 


 05/29/18 20:03





 05/29/18 20:03





- ECG


O2 Sat by Pulse Oximetry: 99 (RA)


Pulse Ox Interpretation: Normal





Medical Decision Making


Medical Decision Making: 


Time: 19:00


Patient signed out to me by Dr. Garcia pending labs and crisis eval





Time: 2013


CT Head FINDINGS:


Brain: Mild-to-moderate atrophy. No intracranial hemorrhage. Dural thickening 

along right frontal


convexity. No mass. No definite edema.


Ventricles: No hydrocephalus.


Bones/joints: No acute fracture. Craniotomy.


Soft tissues: Unremarkable.


Vasculature: Minimal atherosclerotic disease of intracranial arteries.


Sinuses: Scattered minimal mucosal thickening of ethmoid sinuses.


Mastoid air cells: No mastoid effusion.


Orbits: Unremarkable as visualized.


IMPRESSION:


1. No definite acute intracranial abnormality.


2. Incidental/non-acute findings are described above.





Time: 2130


Patient accepted for psychiatric admission and is medically stable for 

admission.


Patient's medical power of  is expected to arrive tomorrow AM so 

patient to be held in ER until then.





0030


Resting in room, no acute distress.





0330


Patient resting in room, no distress.








--------------------------------------------------------------------------------

-----------------


Scribe Attestation:


Documented by Ceci Samano acting as a scribe for Elliott Ellington MD.





MD Scribe Attestation: 


All medical record entries made by the Scribe were at my direction and 

personally dictated by me. I have reviewed the chart and agree that the record 

accurately reflects my personal performance of the history, physical exam, 

medical decision making, and the department course for this patient. I have 

also personally directed, reviewed, and agree with the discharge instructions 

and disposition.








Disposition





- Clinical Impression


Clinical Impression: 


 Dementia with behavioral disturbance








- POA


Present On Arrival: None





- Disposition


Disposition: Transfer of Care


Disposition Time: 07:00


Condition: FAIR


Patient Signed Over To: Annette Gregg

## 2018-05-29 NOTE — ED PDOC
HPI: Psych/Substance Abuse


Time Seen by Provider: 05/29/18 16:56


Chief Complaint (Nursing): Psychiatric Evaluation


Chief Complaint (Provider): Psychiatric Evaluation


ED Caveat: Altered Mental Status


History Per: Other ([facility])


History/Exam Limitations: other ([facility])


Onset/Duration Of Symptoms: Hrs


Current Symptoms Are (Timing): Still Present


Additional History Per: Nursing Home


Additional Complaint(s): 


57 y/o male was brought in by EMS from [facility] presenting to the ED 

for hallucinations. He was calm and cooperative upon arrival.





PMD: Dr. Jaquelin Alfredo








Past Medical History


Reviewed: Historical Data, Nursing Documentation, Vital Signs


Vital Signs: 





 Last Vital Signs











Temp  98.6 F   05/29/18 16:36


 


Pulse  62   05/29/18 16:36


 


Resp  18   05/29/18 16:36


 


BP  125/77   05/29/18 16:36


 


Pulse Ox  99   05/29/18 16:36














- Medical History


PMH: HTN, Seizures


   Denies: Chronic Kidney Disease





- Surgical History


Surgical History: No Surg Hx





- Family History


Family History: States: Unknown Family Hx





- Living Arrangements


Living Arrangements: Nursing Home/Assist Lvng





- Social History


Current smoker - smoking cessation education provided: No


Ex-Smoker (has not smoked in the last 12 months): No


Alcohol: None


Drugs: Denies





- Immunization History


Hx Tetanus Toxoid Vaccination: No


Hx Influenza Vaccination: No


Hx Pneumococcal Vaccination: No





- Home Medications


Home Medications: 


 Ambulatory Orders











 Medication  Instructions  Recorded


 


Aspirin [Ecotrin] 81 mg PO DAILY #0 tabec 03/21/16


 


Famotidine [Pepcid] 20 mg PO BID #0 tab 03/21/16


 


levETIRAcetam [Keppra] 500 mg PO BID #0 tab 03/21/16


 


Atorvastatin [Lipitor] 20 mg PO HS  tab 04/11/18


 


Fenofibrate [Tricor] 145 mg PO DAILY  tab 04/11/18


 


Divalproex [Depakote DR(*BID*)] 500 mg PO BID  tcp 06/05/18


 


Enalapril Maleate [Vasotec] 10 mg PO DAILY  tab 06/05/18


 


Metoprolol Tartrate [Lopressor] 50 mg PO Q12  tab 06/05/18


 


QUEtiapine [Seroquel] 100 mg PO HS  tab 06/05/18














- Allergies


Allergies/Adverse Reactions: 


 Allergies











Allergy/AdvReac Type Severity Reaction Status Date / Time


 


No Known Allergies Allergy   Verified 05/30/18 09:23














Review of Systems


Review Of Systems: ROS cannot be obtained secondary to pt's inabilty to answer 

questions.





Physical Exam





- Reviewed


Nursing Documentation Reviewed: Yes


Vital Signs Reviewed: Yes





- Physical Exam


Appears: Positive for: Non-toxic, No Acute Distress


Head Exam: Positive for: ATRAUMATIC, NORMAL INSPECTION, NORMOCEPHALIC


Skin: Positive for: Normal Color, Warm, DRY


Eye Exam: Positive for: EOMI, Normal appearance, PERRL


Cardiovascular/Chest: Positive for: Regular Rate, Rhythm.  Negative for: Murmur


Respiratory: Positive for: Normal Breath Sounds.  Negative for: Respiratory 

Distress


Neurologic/Psych: Positive for: Alert, Other (hallucinations).  Negative for: 

Oriented (x2)





- Laboratory Results


Result Diagrams: 


 05/29/18 20:03





 06/01/18 06:24





- ECG


O2 Sat by Pulse Oximetry: 99 (RA)


Pulse Ox Interpretation: Normal





Medical Decision Making


Medical Decision Making: 


Time: 16:36





Impression: Hallucinations





Initial Plan:


* EKG


* CMP


* UDip


* CBC


* Urinalysis





--------------------------------------------------------------------------------

-----------------





Scribe Attestation:


Documented by Ceci Samano acting as a scribe Maru Garcia MD.





MD Sanchezibnataliia Attestation: 


All medical record entries made by the Scribe were at my direction and 

personally dictated by me. I have reviewed the chart and agree that the record 

accurately reflects my personal performance of the history, physical exam, 

medical decision making, and the department course for this patient. I have 

also personally directed, reviewed, and agree with the discharge instructions 

and disposition.





Disposition





- Clinical Impression


Clinical Impression: 


 Dementia with behavioral disturbance








- Patient ED Disposition


Is Patient to be Admitted: Transfer of Care





- Disposition


Disposition Time: 19:00


Condition: STABLE


Patient Signed Over To: Elliott Ellington

## 2018-05-30 VITALS — OXYGEN SATURATION: 99 %

## 2018-05-30 PROCEDURE — GZ58ZZZ INDIVIDUAL PSYCHOTHERAPY, COGNITIVE-BEHAVIORAL: ICD-10-PCS | Performed by: PSYCHIATRY & NEUROLOGY

## 2018-05-30 RX ADMIN — DIVALPROEX SODIUM SCH MG: 500 TABLET, DELAYED RELEASE ORAL at 18:02

## 2018-05-30 NOTE — CARD
--------------- APPROVED REPORT --------------





EKG Measurement

Heart [id number]

MD [id number]

[id number]

[id number]

[id number]



<Conclusion>

Normal sinus rhythm

Possible Inferior infarct, age undetermined

Abnormal ECG

## 2018-05-30 NOTE — CP.PCM.HP
History of Present Illness





- History of Present Illness


History of Present Illness: 





pt admitted for behavioral changes/aggression/hallucinations.


no f/c, n/v/d. no complaints at present. no distress/agitation. no si/hi 

verbalized. no obv halluncinations


pt well known to this provider.  no hallucinations/aggress on previous 

admission. 


bw noted





Present on Admission





- Present on Admission


Any Indicators Present on Admission: No





Review of Systems





- Psychiatric


Psychiatric: As Per HPI, Behavioral Changes, Hallucinations





Past Patient History





- Infectious Disease


Hx of Infectious Diseases: None





- Past Medical History & Family History


Past Medical History?: Yes





- Past Social History


Smoking Status: Heavy Smoker > 10 Cigarettes Daily





- CARDIAC


Hx Cardiac Disorders: Yes


Hx Hypertension: Yes


Other/Comment: cardiac arrest





- PULMONARY


Hx Respiratory Disorders: No





- NEUROLOGICAL


Hx Dementia: Yes


Hx Seizures: Yes





- HEENT


Hx HEENT Problems: No





- RENAL


Hx Chronic Kidney Disease: No





- ENDOCRINE/METABOLIC


Hx Endocrine Disorders: No


Hx Diabetes Mellitus Type 2: Yes





- HEMATOLOGICAL/ONCOLOGICAL


Hx Blood Disorders: No





- INTEGUMENTARY


Hx Dermatological Problems: No





- MUSCULOSKELETAL/RHEUMATOLOGICAL


Hx Musculoskeletal Disorders: No


Hx Falls: No





- GASTROINTESTINAL


Hx Gastrointestinal Disorders: No





- GENITOURINARY/GYNECOLOGICAL


Hx Genitourinary Disorders: No





- PSYCHIATRIC


Hx Physical Abuse: No


Hx Sexual Abuse: No


Hx Substance Use: No





- SURGICAL HISTORY


Hx Surgeries: Yes


Hx Musculoskeletal Surgery: Yes


Other/Comment: Rt ankle surgery





- ANESTHESIA


Hx Anesthesia: Yes


Hx Anesthesia Reactions: No





Meds


Allergies/Adverse Reactions: 


 Allergies











Allergy/AdvReac Type Severity Reaction Status Date / Time


 


No Known Allergies Allergy   Verified 05/30/18 09:23














Physical Exam





- Constitutional


Appears: Well, Non-toxic, No Acute Distress





- Head Exam


Head Exam: ATRAUMATIC, NORMAL INSPECTION, NORMOCEPHALIC





- Eye Exam


Eye Exam: EOMI, Normal appearance, PERRL


Pupil Exam: NORMAL ACCOMODATION, PERRL





- ENT Exam


ENT Exam: Mucous Membranes Moist, Normal Exam





- Neck Exam


Neck exam: Positive for: Normal Inspection





- Respiratory Exam


Respiratory Exam: Clear to Auscultation Bilateral, NORMAL BREATHING PATTERN





- Cardiovascular Exam


Cardiovascular Exam: REGULAR RHYTHM, RRR, +S1, +S2





- GI/Abdominal Exam


GI & Abdominal Exam: Normal Bowel Sounds, Soft.  absent: Tenderness





- Extremities Exam


Extremities exam: Positive for: full ROM, normal capillary refill, normal 

inspection, pedal pulses present





- Back Exam


Back exam: NORMAL INSPECTION





- Neurological Exam


Neurological exam: Alert, CN II-XII Intact, Normal Gait, Oriented x3, Reflexes 

Normal





- Psychiatric Exam


Psychiatric exam: Normal Affect, Normal Mood





- Skin


Skin Exam: Dry, Intact, Normal Color, Warm





Results





- Vital Signs


Recent Vital Signs: 





 Last Vital Signs











Temp  97.9 F   05/30/18 15:36


 


Pulse  75   05/30/18 15:36


 


Resp  19   05/30/18 15:36


 


BP  120/79   05/30/18 15:36


 


Pulse Ox  99   05/30/18 13:16














- Labs


Result Diagrams: 


 05/29/18 20:03





 05/29/18 20:03


Labs: 





 Laboratory Results - last 24 hr











  05/29/18 05/29/18 05/29/18





  20:03 20:03 20:10


 


WBC  9.0  


 


RBC  4.19 L  


 


Hgb  13.0  


 


Hct  38.6  


 


MCV  92.2  


 


MCH  30.9  


 


MCHC  33.6  


 


RDW  13.4  


 


Plt Count  296  


 


MPV  7.7  


 


Neut % (Auto)  52.5  


 


Lymph % (Auto)  34.6  


 


Mono % (Auto)  8.5  


 


Eos % (Auto)  4.1 H  


 


Baso % (Auto)  0.3  


 


Neut # (Auto)  4.8  


 


Lymph # (Auto)  3.1  


 


Mono # (Auto)  0.8  


 


Eos # (Auto)  0.4  


 


Baso # (Auto)  0.0  


 


Sodium   145 


 


Potassium   4.5 


 


Chloride   105 


 


Carbon Dioxide   25 


 


Anion Gap   20 


 


BUN   26 H 


 


Creatinine   1.3 


 


Est GFR ( Amer)   > 60 


 


Est GFR (Non-Af Amer)   57 


 


Random Glucose   88 


 


Calcium   9.4 


 


Total Bilirubin   0.5 


 


AST   28 


 


ALT   34 


 


Alkaline Phosphatase   62 


 


Total Protein   8.5 H 


 


Albumin   4.2 


 


Globulin   4.3 H 


 


Albumin/Globulin Ratio   1.0 


 


Urine Color    Yellow


 


Urine Clarity    Slighty-cloudy


 


Urine pH    5.0


 


Ur Specific Gravity    1.027


 


Urine Protein    Negative


 


Urine Glucose (UA)    Neg


 


Urine Ketones    Negative


 


Urine Blood    Negative


 


Urine Nitrate    Negative


 


Urine Bilirubin    Negative


 


Urine Urobilinogen    0.2-1.0


 


Ur Leukocyte Esterase    Neg


 


Urine RBC (Auto)    3


 


Urine Microscopic WBC    1


 


Ur Squamous Epith Cells    1














Assessment & Plan


(1) DVT prophylaxis


Assessment and Plan: 


scd nad ae hose


cont asa


Status: Acute   





(2) Agitated


Assessment and Plan: 


calm at present. 


psych meds interventions, therapies


Status: Acute   





(3) Neurocognitive disorder


Assessment and Plan: 


calm at present. 


psych meds interventions, therapies


Status: Acute   





Decision To Admit





- Pt Status Changed To:


Hospital Disposition Of: Inpatient





- Admit Certification


Admit to Inpatient:: After my assessment, the patient will require 

hospitalization for at least two midnights.  This is because of the severity of 

symptoms shown, intensity of services needed, and/or the medical risk in this 

patient being treated as an outpatient.





- .


Bed Request Type: Reji Psych


Admitting Physician: Violette Walker

## 2018-05-30 NOTE — PCM.PSYCH
Initial Psychiatric Evaluation





- Initial Psychiatric Evaluation


Type of Admission: Voluntary


Legal Status: DPOA


Chief Complaint (in patient's own words): 


"I'm fine."


Patient's Reaction to Hospitalization: 


HPI: 59 yo male, resident of nursing home, sent from nursing home due to 

worsening behavioral disturbances and reporting to staff that he was having 

auditory hallucinations telling him to harm others.  They were also concerned 

that the patient may be experiencing visual hallucinations.   Patient is a poor 

historian due to neurocognitive impairment.  He denies acute AH/VH/SI/HI.  He 

can not give any information and is only oriented to self.





PMHx: CAD, h/o cardiac arrest, HTN, Afib, h/o intubation, neurocognitive 

impairment





PPHx: No past psychiatric admissions





ALL NKDA





SHx: Resident of nursing home, from DR, no drugs/etoh/cig as he is in a 

controlled environment; unclear past drug/etoh history





FHx: Patient unable to give information about family history 


Current Medications: 





Active Medications











Generic Name Dose Route Start Last Admin





  Trade Name Freq  PRN Reason Stop Dose Admin


 


Acetaminophen  650 mg  05/30/18 12:30  





  Tylenol 325mg Tab  PO   





  Q4 PRN   





  Pain, moderate (4-7)   


 


Al Hydrox/Mg Hydrox/Simethicone  30 ml  05/30/18 12:30  





  Maalox Plus 30 Ml  PO   





  Q4 PRN   





  Dyspepsia   


 


Aspirin  81 mg  05/30/18 14:15  





  Ecotrin  PO   





  DAILY MAGADLENA   


 


Atorvastatin Calcium  20 mg  05/30/18 22:00  





  Lipitor  PO   





  HS MAGDALENA   


 


Bismuth Subsalicylate  524 mg  05/30/18 12:30  





  Pepto-Bismol  PO   





  Q4 PRN   





  Diarrhea   


 


Divalproex Sodium  500 mg  05/30/18 17:00  





  Depakotanmay Bai(*Bid*)  PO   





  BID MAGDALENA   


 


Enalapril Maleate  10 mg  05/30/18 14:15  





  Vasotec  PO   





  DAILY MAGDALENA   


 


Famotidine  20 mg  05/30/18 21:00  





  Pepcid  PO   





  Q12 MAGDALENA   


 


Fenofibrate  145 mg  05/30/18 14:15  





  Tricor  PO   





  DAILY MAGDALENA   


 


Levetiracetam  500 mg  05/30/18 17:00  





  Keppra  PO   





  BID MAGDALENA   


 


Lorazepam  0.5 mg  05/30/18 12:30  





  Ativan  PO  06/13/18 12:31  





  HS PRN   





  Insomnia   


 


Lorazepam  0.5 mg  05/30/18 12:30  





  Ativan  PO  06/13/18 12:31  





  Q6 PRN   





  Anixety/Agitation   


 


Magnesium Hydroxide  30 ml  05/30/18 12:30  





  Milk Of Magnesia  PO   





  HS PRN   





  Constipation   


 


Metoprolol Tartrate  50 mg  05/30/18 21:00  





  Lopressor  PO   





  Q12 MAGDALENA   


 


Quetiapine Fumarate  100 mg  05/30/18 22:00  





  Seroquel  PO   





  HS MAGDALENA   














Past Psychiatric History





- Past Psychiatric History


Previous Treatment History: None


Pertinent Medical Hx (Current Medical&Sleep Prob, Allergies): 





 Allergies











Allergy/AdvReac Type Severity Reaction Status Date / Time


 


No Known Allergies Allergy   Verified 05/30/18 09:23








 





Aspirin [Ecotrin] 81 mg PO DAILY #0 tabec 03/21/16 


Enalapril Maleate [Vasotec] 10 mg PO DAILY #0 tab 03/21/16 


Famotidine [Pepcid] 20 mg PO BID #0 tab 03/21/16 


Ibuprofen [Motrin Tab] 600 mg PO Q8 PRN #0 tab 03/21/16 


Metoprolol Tartrate [Lopressor] 50 mg PO Q12 #0 tab 03/21/16 


Nicotine 21 mg/24 hr [Nicoderm Cq] 1 patch TD DAILY #0 patch 03/21/16 


levETIRAcetam [Keppra] 500 mg PO BID #0 tab 03/21/16 


Atorvastatin [Lipitor] 20 mg PO HS  tab 04/11/18 


Divalproex [Depakote DR(*BID*)] 500 mg PO BID  tcp 04/11/18 


Fenofibrate [Tricor] 145 mg PO DAILY  tab 04/11/18 


QUEtiapine [SEROquel] 50 mg PO Q12  tab 04/11/18 











Review of Systems





- Neurological


Neurological: Memory Loss





- Psychiatric


Psychiatric: As Per HPI, Behavioral Changes, Confusion, Difficulty Concentrating

, Hallucinations, Memory Loss, Mood Swings





Mental Status Examination





- Personal Presentation


Personal Presentation: Looks older than stated age





- Affect


Affect: Constricted





- Motor Activity


Motor Activity: Calm





- Reliability in Providing Information


Reliability in Providing Information: Poor, due to cognitve impairment





- Speech


Speech: Disorganized, Irrelevant, Tangential





- Mood


Mood: Neutral





- Formal Thought Process


Formal Thought Process: Loosening of associations





- Hallucinations/Delusions


Additional comments: 


Denies acute AH/VH/paranoia to writer; but was observed by nursing home staff 

to have possible hallucinations prior to admission





- Obsessions/Compulsions


Obsessions: No


Compulsions: No





- Cognitive Functions


Orientation: Person


Sensorium: Alert


Attention/Concentration: Easily distracted


Judgement: Imparied, as evidence by: Poor judgement, Imparied, as evidence by: 

Lack of insight into illness


Memory: Recent impaired, as evidence by: Inability to recall events of the day, 

Recent imparied as evidence by:Inability to complete 3/3 object recall, Remote 

impaired as evidenced by: Inability to recall sig life events, Remote impaired 

as evidenced by: Inability to recall historical events





- Risk


Risk: Homicidal (As per nursing home, patient was having ideation to harm others

; denies current HI), Diminished functioning





- Strength & Assets Inventory


Strength & Assets Inventory: Cooperative





- Limitations


Limitations: Decreased memory, recent





DSM 5 DX





- DSM 5


DSM 5 Diagnosis: 


Dementia with behavioral disturbances; Psychosis NOS





- Recommended/Plan of Treatment


Treatment Recommendations and Plan of Treatment: 


Dementia with behavioral disturbances; Psychosis NOS





-Admit to geriatric psychiatry unit


-Individual and group therapy


-Obtain collateral history


-Continue Depakote 500 mg PO BID; will check VPA


-Increase Seroquel to 100 mg PO HS; will titrate as clinically indicated


-Medicine consult


-Disposition planning


Projected ELOS: 5-10 days


Discharge Plan and Discharge Criteria: 


Discharge when patient is psychiatrically stable





- Smoking Cessation


Smoking Cessation Initiated: No


Reason for not providing: Not indicated

## 2018-05-31 RX ADMIN — DIVALPROEX SODIUM SCH MG: 500 TABLET, DELAYED RELEASE ORAL at 09:15

## 2018-05-31 RX ADMIN — DIVALPROEX SODIUM SCH MG: 500 TABLET, DELAYED RELEASE ORAL at 17:55

## 2018-05-31 NOTE — PCM.PYCHPN
Psychiatric Progress Note





- Psychiatric Progress Note


Patient seen today, length of contact: Patietnt evaluated, case discussed with 

team, chart reviewed


Patient Chief Complaint: 


"I'm fine."


Problems Identified/Issues Discussed: 


Patient continues to have chronic memory deficits and is only oriented x 1.  He 

is currently calm w/ writer.  No behavioral issues overnight.  He denies 

current AH/VH/SI/HI.  Will continue to observe patient for signs of worsening 

psychosis. 


Medication Change: No


Medical Record Reviewed: Yes


Consults ordered or reviewed: 


Medicine consult





Mental Status Examination





- Cognitive Function


Orientation: Person


Memory: Impaired


Attention: Poor


Concentration: Poor


Association: Loose


Fund of Knowledge: Poor


Decription of patient's judgement and insights: 


Chronic poor insight/judgment due to cognitive impairment





- Mood


Mood: Neutral





- Affect


Affect: Constricted





- Formal Thought Process


Formal Thought Process: Loosening of associations


Psychotic Thoughts and Behaviors: 


Denies acute AH/VH





- Suicidal Ideation


Suicidal Ideation: No





- Homicidal Ideation


Homicidal Ideation: No





Goal/Treatment Plan





- Goal/Treatment Plan


Need for Continued Stay: Remain at risks for inpatient hospitalization, 

Discharge may exacerbated symptoms, Severe functional impairment


Progress Toward Problem(s) and Goals/Treatment Plan: 


Dementia with behavioral disturbances; Psychosis NOS





-Individual and group therapy


-Obtain collateral history


-Continue Depakote 500 mg PO BID; will check VPA


-Continue Seroquel 100 mg PO HS; will titrate as clinically indicated


-Medicine consult appreciated


-Disposition planning


Estimated Date of D/C: 06/05/18

## 2018-06-01 LAB
% IRON SATURATION: 21 % (ref 20–55)
ALBUMIN SERPL-MCNC: 3.7 G/DL (ref 3.5–5)
ALBUMIN/GLOB SERPL: 0.9 {RATIO} (ref 1–2.1)
ALT SERPL-CCNC: 27 U/L (ref 21–72)
AST SERPL-CCNC: 21 U/L (ref 17–59)
BUN SERPL-MCNC: 20 MG/DL (ref 9–20)
CALCIUM SERPL-MCNC: 8.8 MG/DL (ref 8.4–10.2)
FERRITIN SERPL-MCNC: 156 NG/ML (ref 17.9–464)
FOLATE SERPL-MCNC: 9.8 NG/ML
GFR NON-AFRICAN AMERICAN: > 60
IRON SERPL-MCNC: 67 UG/DL (ref 49–181)
T4 SERPL-MCNC: 9.3 UG/DL (ref 5.5–11)
TIBC SERPL-MCNC: 321 UG/DL (ref 250–450)
VALPROATE SERPL-MCNC: 61.1 UG/ML (ref 50–100)
VIT B12 SERPL-MCNC: 718 PG/ML (ref 239–931)

## 2018-06-01 RX ADMIN — DIVALPROEX SODIUM SCH MG: 500 TABLET, DELAYED RELEASE ORAL at 17:10

## 2018-06-01 RX ADMIN — DIVALPROEX SODIUM SCH MG: 500 TABLET, DELAYED RELEASE ORAL at 09:27

## 2018-06-01 NOTE — PCM.PYCHPN
Psychiatric Progress Note





- Psychiatric Progress Note


Patient seen today, length of contact: Patietnt evaluated, case discussed with 

team, chart reviewed


Patient Chief Complaint: 


"I'm fine."


Problems Identified/Issues Discussed: 


No behavioral issues overnight.  Patient continues to have chronic memory 

deficits and is only oriented x 1.  He is currently calm w/ writer.   He denies 

current AH/VH/SI/HI.  Will continue to observe patient for signs of worsening 

psychosis. 


Medication Change: No


Medical Record Reviewed: Yes


Consults ordered or reviewed: 


Medicine consult





Mental Status Examination





- Cognitive Function


Orientation: Person


Memory: Impaired


Attention: Poor


Concentration: Poor


Association: Loose


Fund of Knowledge: Poor


Decription of patient's judgement and insights: 


Chronic poor insight/judgment due to cognitive impairment





- Mood


Mood: Neutral





- Affect


Affect: Constricted





- Formal Thought Process


Formal Thought Process: Loosening of associations


Psychotic Thoughts and Behaviors: 


Denies acute AH/VH





- Suicidal Ideation


Suicidal Ideation: No





- Homicidal Ideation


Homicidal Ideation: No





Goal/Treatment Plan





- Goal/Treatment Plan


Need for Continued Stay: Remain at risks for inpatient hospitalization, 

Discharge may exacerbated symptoms, Severe functional impairment


Progress Toward Problem(s) and Goals/Treatment Plan: 


Dementia with behavioral disturbances; Psychosis NOS





-Individual and group therapy


-Continue Depakote 500 mg PO BID; will check VPA


-Continue Seroquel 100 mg PO HS; will titrate as clinically indicated


-Medicine consult appreciated


-Disposition planning


Estimated Date of D/C: 06/05/18

## 2018-06-01 NOTE — PCM.BM
<Katja Howard - Last Filed: 06/01/18 11:47>





Treatment Plan Problems





- Problems identified on initial assessmt


  ** Agitated/aggressive behavior


Date Initiated: 06/01/18


Time Initiated: 11:47


Date resolved: 06/01/18


Assessment reference: HP, SW, NA


Status: Active


Priority: 1





- Milieu Protocol


Milieu Narrative: 


Dementia with behavioral disturbances; Psychosis NOS





-Individual and group therapy


-Continue Depakote 500 mg PO BID; will check VPA


-Continue Seroquel 100 mg PO HS; will titrate as clinically indicated


-Medicine consult appreciated


-Disposition planning





Family Contact


Family involvement: Family/SO is involved


Family contact: Patient agrees to contact, Family has been contacted by patient

, Telephone contact initiated by staff


Family contact name: Jamin Sherwood Esq. - legal guardian


Family contacted how many times per week?: 2


Family contact comment: [phone]





- Outside Agency


  ** [facility]


Care involvment: Information-sharing


Agency contact name: RN


Agency contact number: [phone]





- Goals for Treatment


Patient goals for treatment: Pt unable to provide goals for treatment due to 

cognitive impairment.





Discharge/Continuing Care





- Education Needs


Education Needs: Family Medication, Family Diagnosis/Disease Process, Family 

Coping Skills, Family Placement options, Family Community resources, Family 

Health Practices/Safety, Family Personal Hygiene/Grooming, Family Aftercare 

Safety Plan





- Discharge


Discharge Criteria: Tolerates medication w/o severe side effects, Free of 

paranoid thoughts, Free of agitation, Normal sleep pattern, Ability to care for 

self, Reduction of target symptoms


Discharge to:: Nursing Home





- Additional Comments





06/01/18 11:35


Pt seen and discussed in team meeting. Interdisciplinary team met with pt at 

bedside. Upon approach, pt was observed to be resting in bed, but easily 

aroused. Pt reported he is in the [facility]; however, cannot provide 

team with reason for hospitalization. Pt reported that he wishes to go back to 

his home and get discharged. Pt denied SI and HI. Team unable to assess for AVH 

and paranoia due to pt becoming increasingly agitated and irritable. Pt got up 

from bed and demanded to be provided with his clothes so that he can return 

back home. When asked where his home was, pt became loud and irritable stating 

"it's over there." Pt's medications reviewed and discussed. SW to continue to 

follow case.   





- Treatment Team Participation


Patient/Family/SO Statement: 


Dementia with behavioral disturbances; Psychosis NOS





-Individual and group therapy


-Continue Depakote 500 mg PO BID; will check VPA


-Continue Seroquel 100 mg PO HS; will titrate as clinically indicated


-Medicine consult appreciated


-Disposition planning


Discussed with Family/SO: Yes (Reviewed with legal guardian via telephone on 05/ 21/2018)


Was Patient/Family/SO present at Treatment Team Meeting: Yes (Interdisciplinary 

team met with pt at bedside)





<Samira Christie - Last Filed: 06/01/18 11:52>





- Diagnosis


(1) Dementia with behavioral disturbance


Status: Acute   


Interventions: 


Medication management, Individual and group therapy, Psychoeducation


06/01/18 11:52











<Frida Salcido - Last Filed: 06/01/18 12:14>





Treatment assets and liabiliti


Patient Assests: physically healthy


Patient Liabilities: imparied memory, language/speech





- Milieu Protocol


Maintain good personal hygiene: daily Encourage regular showers, daily Remind 

patient to perform daily oral care, daily Assist patient to perform ADL's


Maintain personal safety: every shift Educate patient to report safety concerns 

to staff, every shift Monitor environment for contraband/sharps


Medication safety: Monitor for expected outcome, potential side effects: every 

shift, Assess barriers to learning: every shift, Assess readiness for 

medication education: every shift

## 2018-06-02 RX ADMIN — DIVALPROEX SODIUM SCH MG: 500 TABLET, DELAYED RELEASE ORAL at 08:28

## 2018-06-02 RX ADMIN — DIVALPROEX SODIUM SCH MG: 500 TABLET, DELAYED RELEASE ORAL at 17:28

## 2018-06-02 NOTE — PCM.PYCHPN
Psychiatric Progress Note





- Psychiatric Progress Note


Patient seen today, length of contact: Patietnt evaluated, case discussed with 

team, chart reviewed


Patient Chief Complaint: 





pt has been less agitated and less labile and denies side effects 


Medication Change: No


Medical Record Reviewed: Yes





Mental Status Examination





- Cognitive Function


Orientation: Person


Memory: Impaired


Attention: Poor


Concentration: Poor


Association: Loose


Fund of Knowledge: Poor





- Mood


Mood: Neutral





- Affect


Affect: Constricted





- Formal Thought Process


Formal Thought Process: Loosening of associations





- Suicidal Ideation


Suicidal Ideation: No





- Homicidal Ideation


Homicidal Ideation: No





Goal/Treatment Plan





- Goal/Treatment Plan


Need for Continued Stay: Remain at risks for inpatient hospitalization, 

Discharge may exacerbated symptoms, Severe functional impairment


Progress Toward Problem(s) and Goals/Treatment Plan: 





will titrate depakote as needed to stabilize the mood and engage pt in therapy.


Estimated Date of D/C: 06/05/18

## 2018-06-03 RX ADMIN — DIVALPROEX SODIUM SCH MG: 500 TABLET, DELAYED RELEASE ORAL at 17:01

## 2018-06-03 RX ADMIN — DIVALPROEX SODIUM SCH MG: 500 TABLET, DELAYED RELEASE ORAL at 09:20

## 2018-06-03 NOTE — PCM.PYCHPN
Psychiatric Progress Note





- Psychiatric Progress Note


Patient seen today, length of contact: Patietnt evaluated, case discussed with 

team, chart reviewed


Patient Chief Complaint: 





pt has been less agitated and with no behavioral issues .pt remains with poor 

memory and poor insight and need further stabilization.


Medication Change: No


Medical Record Reviewed: Yes





Mental Status Examination





- Cognitive Function


Orientation: Person


Memory: Impaired


Attention: Poor


Concentration: Poor


Association: Loose


Fund of Knowledge: Poor





- Mood


Mood: Neutral





- Affect


Affect: Constricted





- Formal Thought Process


Formal Thought Process: Loosening of associations





- Suicidal Ideation


Suicidal Ideation: No





- Homicidal Ideation


Homicidal Ideation: No





Goal/Treatment Plan





- Goal/Treatment Plan


Need for Continued Stay: Remain at risks for inpatient hospitalization, 

Discharge may exacerbated symptoms, Severe functional impairment


Progress Toward Problem(s) and Goals/Treatment Plan: 





will titrate depakote as needed to stabilize the mood and engage pt in therapy.


Estimated Date of D/C: 06/05/18

## 2018-06-04 RX ADMIN — DIVALPROEX SODIUM SCH MG: 500 TABLET, DELAYED RELEASE ORAL at 08:37

## 2018-06-04 RX ADMIN — DIVALPROEX SODIUM SCH MG: 500 TABLET, DELAYED RELEASE ORAL at 18:05

## 2018-06-04 NOTE — PCM.PYCHPN
Psychiatric Progress Note





- Psychiatric Progress Note


Patient seen today, length of contact: Patietnt evaluated, case discussed with 

team, chart reviewed


Patient Chief Complaint: 


"I'm fine."


Problems Identified/Issues Discussed: 


Patient continues to have chronic memory deficits and demands to leave to go 

home intermittently, although he is unable to state where his home is.   He is 

less irritable and more redirectable, but can become easily upset if you tell 

him he can't leave to go home.  He denies current AH/VH/SI/HI.  Will continue 

to observe patient for signs of worsening psychosis. 


Diagnostic Results: 


VPA 61.1 on 6/1/18


Medication Change: No


Medical Record Reviewed: Yes


Consults ordered or reviewed: 


Medicine consult





Mental Status Examination





- Cognitive Function


Orientation: Person, Place


Memory: Impaired


Attention: Poor


Concentration: Poor


Association: Loose


Fund of Knowledge: Poor


Decription of patient's judgement and insights: 


Poor I/J





- Mood


Mood: Neutral





- Affect


Affect: Constricted





- Speech


Speech: Appropriate





- Formal Thought Process


Formal Thought Process: Loosening of associations


Psychotic Thoughts and Behaviors: 


Denies AH/VH





- Suicidal Ideation


Suicidal Ideation: No





- Homicidal Ideation


Homicidal Ideation: No





Goal/Treatment Plan





- Goal/Treatment Plan


Need for Continued Stay: Remain at risks for inpatient hospitalization, 

Discharge may exacerbated symptoms, Severe functional impairment


Progress Toward Problem(s) and Goals/Treatment Plan: 


Dementia with behavioral disturbances; Psychosis NOS





-Individual and group therapy


-Continue Depakote 500 mg PO BID; VPA 61.1 on 6/1/18


-Continue Seroquel 100 mg PO HS; will titrate as clinically indicated


-Medicine consult appreciated


-Disposition planning


Estimated Date of D/C: 06/06/18

## 2018-06-05 RX ADMIN — DIVALPROEX SODIUM SCH MG: 500 TABLET, DELAYED RELEASE ORAL at 08:44

## 2018-06-05 RX ADMIN — DIVALPROEX SODIUM SCH MG: 500 TABLET, DELAYED RELEASE ORAL at 16:41

## 2018-06-05 NOTE — PCM.PYCHPN
Psychiatric Progress Note





- Psychiatric Progress Note


Patient seen today, length of contact: Patietnt evaluated, case discussed with 

team, chart reviewed


Patient Chief Complaint: 


"I'm fine."


Problems Identified/Issues Discussed: 


Patient continues to improve clinically.  He is calmer and less labile.  He  

continues to have chronic memory deficits due to neurocognitive impairment.  

Denies AH/VH/SI/HI.  


Diagnostic Results: 


VPA 61.1 on 6/1/18


Medication Change: No


Medical Record Reviewed: Yes


Consults ordered or reviewed: 


Medicine consult





Mental Status Examination





- Cognitive Function


Orientation: Person, Place


Memory: Impaired


Attention: Poor


Concentration: Poor


Association: Loose


Fund of Knowledge: Poor


Decription of patient's judgement and insights: 


Chronic poor I/J due to neurocognitive impairment





- Mood


Mood: Neutral





- Affect


Affect: Constricted





- Speech


Speech: Appropriate





- Formal Thought Process


Formal Thought Process: Loosening of associations


Psychotic Thoughts and Behaviors: 


Denies AH/VH





- Suicidal Ideation


Suicidal Ideation: No





- Homicidal Ideation


Homicidal Ideation: No





Goal/Treatment Plan





- Goal/Treatment Plan


Need for Continued Stay: Discharge may exacerbated symptoms, Severe functional 

impairment


Progress Toward Problem(s) and Goals/Treatment Plan: 


Dementia with behavioral disturbances; patient is improving clinically and will 

likely be discharged back to nursing home tomorrow





-Individual and group therapy


-Continue Depakote 500 mg PO BID; VPA 61.1 on 6/1/18


-Continue Seroquel 100 mg PO HS


-Medicine consult appreciated


-Disposition planning- likely discharge back to nursing home tomorrow


Estimated Date of D/C: 06/06/18





- Smoking Cessation


Smoking Cessation Initiated: No


Reason for not providing: Not indicated

## 2018-06-05 NOTE — CP.PCM.PN
Subjective





- Date & Time of Evaluation


Date of Evaluation: 06/05/18


Time of Evaluation: 07:42





- Subjective


Subjective: 





pt doignw ell. no distress/agittation noted.  psych notes reviewed.  


pt has h/o agitation/confusion about leaving the hospital





Objective





- Vital Signs/Intake and Output


Vital Signs (last 24 hours): 


 











Temp Pulse Resp BP Pulse Ox


 


 96.6 F L  66   18   113/68   99 


 


 06/05/18 06:00  06/05/18 06:00  06/05/18 06:00  06/05/18 06:00  05/31/18 06:26











- Medications


Medications: 


 Current Medications





Acetaminophen (Tylenol 325mg Tab)  650 mg PO Q4 PRN


   PRN Reason: Pain, moderate (4-7)


   Last Admin: 06/03/18 09:28 Dose:  650 mg


Al Hydrox/Mg Hydrox/Simethicone (Maalox Plus 30 Ml)  30 ml PO Q4 PRN


   PRN Reason: Dyspepsia


Aspirin (Ecotrin)  81 mg PO DAILY [facility]


   Last Admin: 06/04/18 08:36 Dose:  81 mg


Atorvastatin Calcium (Lipitor)  20 mg PO HS [facility]


   Last Admin: 06/04/18 21:15 Dose:  20 mg


Bismuth Subsalicylate (Pepto-Bismol)  524 mg PO Q4 PRN


   PRN Reason: Diarrhea


Divalproex Sodium (Depakote Dr(*Bid*))  500 mg PO BID [facility]


   Last Admin: 06/04/18 18:05 Dose:  500 mg


Enalapril Maleate (Vasotec)  10 mg PO DAILY [facility]


   Last Admin: 06/04/18 08:21 Dose:  Not Given


Famotidine (Pepcid)  20 mg PO Q12 [facility]


   Last Admin: 06/04/18 21:15 Dose:  20 mg


Fenofibrate (Tricor)  145 mg PO DAILY [facility]


   Last Admin: 06/04/18 08:36 Dose:  145 mg


Levetiracetam (Keppra)  500 mg PO BID [facility]


   Last Admin: 06/04/18 18:05 Dose:  500 mg


Lorazepam (Ativan)  0.5 mg PO HS PRN


   PRN Reason: Insomnia


   Stop: 06/13/18 12:31


Lorazepam (Ativan)  0.5 mg PO Q6 PRN


   PRN Reason: Anixety/Agitation


   Stop: 06/13/18 12:31


Magnesium Hydroxide (Milk Of Magnesia)  30 ml PO HS PRN


   PRN Reason: Constipation


Metoprolol Tartrate (Lopressor)  50 mg PO Q12 [facility]


   Last Admin: 06/04/18 21:15 Dose:  50 mg


Quetiapine Fumarate (Seroquel)  100 mg PO HS [facility]


   Last Admin: 06/04/18 21:15 Dose:  100 mg











- Labs


Labs: 


 





 05/29/18 20:03 





 06/01/18 06:24 











- Constitutional


Appears: Well, Non-toxic, No Acute Distress





- Head Exam


Head Exam: ATRAUMATIC, NORMAL INSPECTION, NORMOCEPHALIC





- Eye Exam


Eye Exam: EOMI, Normal appearance, PERRL


Pupil Exam: NORMAL ACCOMODATION, PERRL





- ENT Exam


ENT Exam: Mucous Membranes Moist, Normal Exam





- Neck Exam


Neck Exam: Full ROM, Normal Inspection.  absent: Lymphadenopathy





- Respiratory Exam


Respiratory Exam: Clear to Ausculation Bilateral, NORMAL BREATHING PATTERN





- Cardiovascular Exam


Cardiovascular Exam: REGULAR RHYTHM, RRR, +S1, +S2.  absent: Murmur





- GI/Abdominal Exam


GI & Abdominal Exam: Soft, Normal Bowel Sounds.  absent: Tenderness





- Extremities Exam


Extremities Exam: Full ROM, Normal Capillary Refill, Normal Inspection.  absent

: Joint Swelling, Pedal Edema





- Back Exam


Back Exam: NORMAL INSPECTION





- Neurological Exam


Neurological Exam: Alert, Awake, CN II-XII Intact, Normal Gait, Oriented x3





- Psychiatric Exam


Psychiatric exam: Normal Affect, Normal Mood





- Skin


Skin Exam: Dry, Intact, Normal Color, Warm





Assessment and Plan


(1) DVT prophylaxis


Status: Acute   





(2) Agitated


Status: Acute   





(3) Neurocognitive disorder


Status: Acute   





- Assessment and Plan (Free Text)


Assessment: 





(1) DVT prophylaxis


Assessment and Plan: 


scd nad ae hose


cont asa


Status: Acute   





(2) Agitated


Assessment and Plan: 


calm at present. 


psych meds interventions, therapies


Status: Acute   





(3) Neurocognitive disorder


Assessment and Plan: 


calm at present. 


psych meds interventions, therapies


Status: Acute

## 2018-06-06 VITALS
DIASTOLIC BLOOD PRESSURE: 77 MMHG | SYSTOLIC BLOOD PRESSURE: 110 MMHG | RESPIRATION RATE: 20 BRPM | HEART RATE: 70 BPM | TEMPERATURE: 97.9 F

## 2018-06-06 RX ADMIN — DIVALPROEX SODIUM SCH MG: 500 TABLET, DELAYED RELEASE ORAL at 16:53

## 2018-06-06 RX ADMIN — DIVALPROEX SODIUM SCH MG: 500 TABLET, DELAYED RELEASE ORAL at 08:59

## 2018-06-06 NOTE — PCM.PYCHDC
Mental Status Examination





- Mental Status Examination


Orientation: Person, Place


Memory: Impaired (Chronic deficits in memory, attention, concentration, 

association, knowledge due to neurocognitive impairment)


Mood: Neutral


Affect: Broad


Speech: Appropriate


Attention: Poor


Concentration: Poor


Association: Loose


Fund of Knowledge: Poor


Formal Thought Process: Loosening of associations


Description of patient's judgement and insight: 


Chronic poor I/J due to neurocognitive impairment


Psychotic Thoughts and Behaviors: 


Denies AH/VH


Suicidal Ideation: No


Current Homicidal Ideation?: No





Discharge Summary





- Discharge Note


Reason for Hospitalization: 


HPI: 59 yo male, resident of nursing home, sent from nursing home due to 

worsening behavioral disturbances and reporting to staff that he was having 

auditory hallucinations telling him to harm others.  They were also concerned 

that the patient may be experiencing visual hallucinations.   Patient is a poor 

historian due to neurocognitive impairment.  He denies acute AH/VH/SI/HI.  He 

can not give any information and is only oriented to self.





PMHx: CAD, h/o cardiac arrest, HTN, Afib, h/o intubation, neurocognitive 

impairment





PPHx: No past psychiatric admissions





ALL NKDA





SHx: Resident of nursing home, from DR, no drugs/etoh/cig as he is in a 

controlled environment; unclear past drug/etoh history





FHx: Patient unable to give information about family history 


Consultations:: List each consultation separately and include:  1. Reason for 

request.  2. Findings.  3. Follow-up


Consultations: 


Medicine consult


Summary of Hospital Course include:: 1. Description of specific treatment plan 

utilized for patients during their course of treatmen.  2. Summarize the time-

course for resolution of acute symptoms and/or regressed behaviors.  3. 

Describe issues identified and worked on during hospitalization.  4. Describe 

medication utilized.  5. Describe medical problems identified and treated.  6. 

Reassessment of suicide risk


Summary of Hospital Course: 


Patient was admitted to the geriatric psychiatry unit.  Individual and group 

therapy were provided.  Patient was stabilized on Depakote 500 mg PO BID (VPA 

61.1) and Seroquel 100 mg PO HS.  He is currently psychiatrically stable and at 

his baseline of functioning.  No current AH/VH/SI/HI/behavioral disturbances. 





- Diagnosis


(1) Dementia with behavioral disturbance


Current Visit: Yes   Status: Acute   





- Final Diagnosis (DSM 5)


Condition upon Discharge: STABLE


DSM 5: 


Dementia with behavioral disturbances


Disposition: HOME/ ROUTINE


Follow-up Treatment Plan: 


Dementia with behavioral disturbances





-Individual and group therapy


-Continue Depakote 500 mg PO BID; VPA 61.1 on 6/1/18


-Continue Seroquel 100 mg PO HS


-Medicine consult appreciated


-Discharge back to nursing home





- Smoking Cessation


Smoking Cessation Medication prescribed: No


Reason for not providing: Not indicated





- Antipsychotic Medications


Pt discharged on 2 or more routine antipsychotic medications: No

## 2019-03-07 ENCOUNTER — HOSPITAL ENCOUNTER (EMERGENCY)
Dept: HOSPITAL 14 - H.ER | Age: 59
LOS: 1 days | Discharge: HOME | End: 2019-03-08
Payer: MEDICAID

## 2019-03-07 VITALS — BODY MASS INDEX: 21.2 KG/M2

## 2019-03-07 DIAGNOSIS — F03.91: Primary | ICD-10-CM

## 2019-03-07 DIAGNOSIS — E11.9: ICD-10-CM

## 2019-03-07 DIAGNOSIS — I10: ICD-10-CM

## 2019-03-07 LAB
ALBUMIN SERPL-MCNC: 3.9 G/DL (ref 3.5–5)
ALBUMIN/GLOB SERPL: 1 {RATIO} (ref 1–2.1)
ALT SERPL-CCNC: 29 U/L (ref 21–72)
AST SERPL-CCNC: 43 U/L (ref 17–59)
BASOPHILS # BLD AUTO: 0 K/UL (ref 0–0.2)
BASOPHILS NFR BLD: 0.2 % (ref 0–2)
BUN SERPL-MCNC: 39 MG/DL (ref 9–20)
CALCIUM SERPL-MCNC: 9.3 MG/DL (ref 8.4–10.2)
EOSINOPHIL # BLD AUTO: 0.4 K/UL (ref 0–0.7)
EOSINOPHIL NFR BLD: 6.6 % (ref 0–4)
ERYTHROCYTE [DISTWIDTH] IN BLOOD BY AUTOMATED COUNT: 13.8 % (ref 11.5–14.5)
GFR NON-AFRICAN AMERICAN: 45
HGB BLD-MCNC: 10.3 G/DL (ref 12–18)
LYMPHOCYTES # BLD AUTO: 2.2 K/UL (ref 1–4.3)
LYMPHOCYTES NFR BLD AUTO: 33.9 % (ref 20–40)
MCH RBC QN AUTO: 32.1 PG (ref 27–31)
MCHC RBC AUTO-ENTMCNC: 33.1 G/DL (ref 33–37)
MCV RBC AUTO: 97 FL (ref 80–94)
MONOCYTES # BLD: 0.7 K/UL (ref 0–0.8)
MONOCYTES NFR BLD: 10.6 % (ref 0–10)
NEUTROPHILS # BLD: 3.2 K/UL (ref 1.8–7)
NEUTROPHILS NFR BLD AUTO: 48.7 % (ref 50–75)
NRBC BLD AUTO-RTO: 0.1 % (ref 0–0)
PLATELET # BLD: 221 K/UL (ref 130–400)
PMV BLD AUTO: 7.2 FL (ref 7.2–11.7)
RBC # BLD AUTO: 3.22 MIL/UL (ref 4.4–5.9)
WBC # BLD AUTO: 6.5 K/UL (ref 4.8–10.8)

## 2019-03-07 PROCEDURE — 82948 REAGENT STRIP/BLOOD GLUCOSE: CPT

## 2019-03-07 PROCEDURE — 80164 ASSAY DIPROPYLACETIC ACD TOT: CPT

## 2019-03-07 PROCEDURE — 85025 COMPLETE CBC W/AUTO DIFF WBC: CPT

## 2019-03-07 PROCEDURE — 71045 X-RAY EXAM CHEST 1 VIEW: CPT

## 2019-03-07 PROCEDURE — 84443 ASSAY THYROID STIM HORMONE: CPT

## 2019-03-07 PROCEDURE — 80053 COMPREHEN METABOLIC PANEL: CPT

## 2019-03-07 PROCEDURE — 93005 ELECTROCARDIOGRAM TRACING: CPT

## 2019-03-07 PROCEDURE — 81003 URINALYSIS AUTO W/O SCOPE: CPT

## 2019-03-07 PROCEDURE — 99285 EMERGENCY DEPT VISIT HI MDM: CPT

## 2019-03-07 NOTE — ED PDOC
- Laboratory Results


Result Diagrams: 


                                 03/07/19 18:03





                                 03/07/19 18:03


Lab Results: 





                                        











Total Bilirubin  0.2 mg/dl (0.2-1.3)   03/07/19  18:03    


 


AST  43 U/L (17-59)   03/07/19  18:03    


 


ALT  29 U/L (21-72)   03/07/19  18:03    


 


Alkaline Phosphatase  106 U/L ()   03/07/19  18:03    


 


Total Protein  7.7 G/DL (6.3-8.2)   03/07/19  18:03    


 


Albumin  3.9 g/dL (3.5-5.0)   03/07/19  18:03    


 


Globulin  3.8 gm/dL (2.2-3.9)   03/07/19  18:03    


 


Albumin/Globulin Ratio  1.0  (1.0-2.1)   03/07/19  18:03    














- ECG


O2 Sat by Pulse Oximetry: 97





- Radiology


X-Ray: Viewed By Me


X-Ray Interpretation: No Acute Disease





- Progress


ED Course And Treament: 





Case endorsed to writer from Kayden ROJAS pending labs, cxr, crisis eval





Patient evaluated by crisis worker; does not meet criteria for admission at this

time as per Dr. Dawson


Patient remains calm, cooperative throughout ED visit





Patient requires no further intervention in the ED and is stable for discharge 

back to nursing home at this time














Disposition





- Clinical Impression


Clinical Impression: 


 Dementia








- POA


Present On Arrival: None





- Disposition


Disposition: Routine/Home


Disposition Time: 02:30


Condition: STABLE


Instructions:  Dementia (DC)


Print Language: Russian

## 2019-03-07 NOTE — ED PDOC
HPI: Psych/Substance Abuse


Time Seen by Provider: 03/07/19 17:37


Chief Complaint (Nursing): Psychiatric Evaluation


Chief Complaint (Provider): CRISIS EVAL


History Per: Patient (60 Y/O MALE H/O DEMENTIA/DIABETES SENT FROM NURSING HOME 

FOR EVALUATION OF AGITATED/AGGRESSIVE BEHAVIOR TODAY.  WHEN PATIENT IS ASKED 

TODAY WHAT HAPPENED, HE STATES HE IS DIZZY, BUT HAS NO OTHER COMPLAINTS.  DENIES

ANY SI/HI.)





Past Medical History


Reviewed: Historical Data, Nursing Documentation, Vital Signs


Vital Signs: 





                                Last Vital Signs











Temp  97.6 F   03/07/19 19:44


 


Pulse  64   03/07/19 19:44


 


Resp  17   03/07/19 19:44


 


BP  94/60 L  03/07/19 19:44


 


Pulse Ox  97   03/07/19 19:44














- Medical History


PMH: Dementia, HTN, Seizures


   Denies: Diabetes, Hepatitis, HIV, Chronic Kidney Disease, Sexually 

Transmitted Disease





- Family History


Family History: States: Unknown Family Hx





- Immunization History


Hx Tetanus Toxoid Vaccination: No


Hx Influenza Vaccination: No


Hx Pneumococcal Vaccination: No





- Home Medications


Home Medications: 


                                Ambulatory Orders











 Medication  Instructions  Recorded


 


Aspirin [Ecotrin] 81 mg PO DAILY #0 tabec 03/21/16


 


Famotidine [Pepcid] 20 mg PO BID #0 tab 03/21/16


 


levETIRAcetam [Keppra] 500 mg PO BID #0 tab 03/21/16


 


Atorvastatin [Lipitor] 20 mg PO HS  tab 04/11/18


 


Fenofibrate [Tricor] 145 mg PO DAILY  tab 04/11/18


 


Enalapril Maleate [Vasotec] 10 mg PO DAILY  tab 06/05/18


 


Metoprolol Tartrate [Lopressor] 50 mg PO Q12  tab 06/05/18


 


Bacitracin OINT 1 applic TOP BID  tube 06/27/18


 


Divalproex [Depakote DR(*BID*)] 750 mg PO BID  tcp 06/27/18


 


LORazepam [Ativan] 1 mg PO Q8 PRN  tab 06/27/18


 


QUEtiapine [SEROquel] 200 mg PO DAILY  tab 06/27/18


 


QUEtiapine [SEROquel] 400 mg PO HS  tab 06/27/18














- Allergies


Allergies/Adverse Reactions: 


                                    Allergies











Allergy/AdvReac Type Severity Reaction Status Date / Time


 


No Known Allergies Allergy   Verified 03/07/19 17:31














Review of Systems


ROS Statement: Except As Marked, All Systems Reviewed And Found Negative





Physical Exam





- Reviewed


Nursing Documentation Reviewed: Yes


Vital Signs Reviewed: Yes





- Physical Exam


Appears: Positive for: Well, Non-toxic, No Acute Distress


Head Exam: Positive for: ATRAUMATIC, NORMAL INSPECTION, NORMOCEPHALIC


Skin: Positive for: Normal Color, Warm, DRY


Eye Exam: Positive for: EOMI, Normal appearance, PERRL


ENT: Positive for: Normal ENT Inspection


Neck: Positive for: Normal, Painless ROM


Cardiovascular/Chest: Positive for: Regular Rate, Rhythm


Respiratory: Positive for: CNT, Normal Breath Sounds


Gastrointestinal/Abdominal: Positive for: Normal Exam, Soft


Back: Positive for: Normal Inspection


Extremity: Positive for: Normal ROM


Neurological/Psych: Positive for: Awake, Alert, Normal Tone





- Laboratory Results


Result Diagrams: 


                                 03/07/19 18:03





                                 03/07/19 18:03


Lab Results: 





                                        











Total Bilirubin  0.2 mg/dl (0.2-1.3)   03/07/19  18:03    


 


AST  43 U/L (17-59)   03/07/19  18:03    


 


ALT  29 U/L (21-72)   03/07/19  18:03    


 


Alkaline Phosphatase  106 U/L ()   03/07/19  18:03    


 


Total Protein  7.7 G/DL (6.3-8.2)   03/07/19  18:03    


 


Albumin  3.9 g/dL (3.5-5.0)   03/07/19  18:03    


 


Globulin  3.8 gm/dL (2.2-3.9)   03/07/19  18:03    


 


Albumin/Globulin Ratio  1.0  (1.0-2.1)   03/07/19  18:03    














- ECG


ECG Rhythm: Positive for: Sinus Rhythm (67BM NO ECTOPY NO ACUTE CHANGES.)


O2 Sat by Pulse Oximetry: 97





Disposition





- Clinical Impression


Clinical Impression: 


 Dementia








- Patient ED Disposition


Is Patient to be Admitted: Transfer of Care





- Disposition


Disposition: Transfer of Care


Disposition Time: 19:48


Condition: FAIR


Patient Signed Over To: Yajaira Davis


Handoff Comments: PENDING URINALYSIS/DRUG SCREEN/CXR/CRISIS EVAL

## 2019-03-08 VITALS
RESPIRATION RATE: 19 BRPM | OXYGEN SATURATION: 100 % | SYSTOLIC BLOOD PRESSURE: 128 MMHG | DIASTOLIC BLOOD PRESSURE: 70 MMHG | TEMPERATURE: 98.1 F | HEART RATE: 67 BPM

## 2019-03-08 LAB
BILIRUB UR-MCNC: NEGATIVE MG/DL
COLOR UR: YELLOW
GLUCOSE UR STRIP-MCNC: (no result) MG/DL
LEUKOCYTE ESTERASE UR-ACNC: (no result) LEU/UL
PH UR STRIP: 6 [PH] (ref 5–8)
PROT UR STRIP-MCNC: NEGATIVE MG/DL
RBC # UR STRIP: NEGATIVE /UL
SP GR UR STRIP: 1.02 (ref 1–1.03)
SQUAMOUS EPITHIAL: < 1 /HPF (ref 0–5)
URINE CLARITY: (no result)
UROBILINOGEN UR-MCNC: (no result) MG/DL (ref 0.2–1)

## 2019-03-08 NOTE — CARD
--------------- APPROVED REPORT --------------





Date of service: 03/07/2019



EKG Measurement

Heart [id number]

[place] [id number]

[id number]

[id number]

[id number]



<Conclusion>

Normal sinus rhythm

Normal ECG

## 2019-03-08 NOTE — RAD
Date of service: 



03/07/2019



HISTORY:

 routine 



COMPARISON:

Frontal radiograph chest radiograph 06/10/2018. 



FINDINGS:



LUNGS:

Diminished inspiratory volume.  No infiltrates bilaterally.  Right 

pulmonary peripheral granulomata reiterated.



PLEURA:

No significant pleural effusion identified, no pneumothorax apparent.



CARDIOVASCULAR:

No aortic atherosclerotic calcification present.



Normal cardiac size. No pulmonary vascular congestion. 



OSSEOUS STRUCTURES:

No significant abnormalities.



VISUALIZED UPPER ABDOMEN:

Normal.



OTHER FINDINGS:

None.



IMPRESSION:

Diminished inspiratory volume.  No infiltrates, pleural effusion or 

pulmonary vascular congestion.  Multiple granulomata again seen the 

right lung in the periphery.

## 2019-03-08 NOTE — ED PDOC
- Laboratory Results


Result Diagrams: 


                                 03/07/19 18:03





                                 03/07/19 18:03


Lab Results: 





                                        











Total Bilirubin  0.2 mg/dl (0.2-1.3)   03/07/19  18:03    


 


AST  43 U/L (17-59)   03/07/19  18:03    


 


ALT  29 U/L (21-72)   03/07/19  18:03    


 


Alkaline Phosphatase  106 U/L ()   03/07/19  18:03    


 


Total Protein  7.7 G/DL (6.3-8.2)   03/07/19  18:03    


 


Albumin  3.9 g/dL (3.5-5.0)   03/07/19  18:03    


 


Globulin  3.8 gm/dL (2.2-3.9)   03/07/19  18:03    


 


Albumin/Globulin Ratio  1.0  (1.0-2.1)   03/07/19  18:03    








                                        











Urine Color  Yellow  (YELLOW)   03/08/19  01:42    


 


Urine Clarity  Slighty-cloudy  (Clear)   03/08/19  01:42    


 


Urine pH  6.0  (5.0-8.0)   03/08/19  01:42    


 


Ur Specific Gravity  1.021  (1.003-1.030)   03/08/19  01:42    


 


Urine Protein  Negative mg/dL (NEGATIVE)   03/08/19  01:42    


 


Urine Glucose (UA)  Neg mg/dL (NEGATIVE)   03/08/19  01:42    


 


Urine Ketones  Negative mg/dL (NEGATIVE)   03/08/19  01:42    


 


Urine Blood  Negative  (NEGATIVE)   03/08/19  01:42    


 


Urine Nitrate  Negative  (NEGATIVE)   03/08/19  01:42    


 


Urine Bilirubin  Negative  (NEGATIVE)   03/08/19  01:42    


 


Urine Urobilinogen  0.2-1.0 mg/dL (0.2-1.0)   03/08/19  01:42    


 


Ur Leukocyte Esterase  Neg Miranda/uL (Negative)   03/08/19  01:42    


 


Urine RBC (Auto)  < 1 /hpf (0-3)   03/08/19  01:42    


 


Urine Microscopic WBC  1 /hpf (0-5)   03/08/19  01:42    


 


Ur Squamous Epith Cells  < 1 /hpf (0-5)   03/08/19  01:42    














- ECG


O2 Sat by Pulse Oximetry: 97 (RA)


Pulse Ox Interpretation: Normal





Medical Decision Making


Medical Decision Making: 





Time: 0600


--Patient signed out to this provider by Yajaira Ndiaye PA-C, pending social 

services consult.





Time: 0700


--Patient signed out to Dr. Gregg by this provider, pending  

consult.





---------------------------------

----------------------------------------------------------------


Scribe Attestation:


Documented by Carlee Slater, acting as a scribe for Roman Padilla MD.








Provider Scribe Attestation:


All medical record entries made by the Scribe were at my direction and 

personally dictated by me. I have reviewed the chart and agree that the record 

accurately reflects my personal performance of the history, physical exam, 

medical decision making, and the department course for this patient. I have also

personally directed, reviewed, and agree with the discharge instructions and 

disposition.








Disposition





- Clinical Impression


Clinical Impression: 


 Dementia








- POA


Present On Arrival: None





- Disposition


Disposition: Transfer of Care


Disposition Time: 07:00


Condition: STABLE


Instructions:  Dementia (DC)


Print Language: Macedonian

## 2019-05-10 NOTE — CP.PCM.PN
Subjective





- Date & Time of Evaluation


Date of Evaluation: 11/19/16


Time of Evaluation: 07:12





- Subjective


Subjective: 


no complaints/idstress


pending placement


no f/c n/v/d





Objective





- Vital Signs/Intake and Output


Vital Signs (last 24 hours): 


 











Temp Pulse Resp BP Pulse Ox


 


 97.9 F   73   18   142/64   95 


 


 11/18/16 22:29  11/18/16 22:29  11/18/16 22:29  11/18/16 22:29  11/18/16 22:29











- Medications


Medications: 


 Current Medications





Aspirin (Ecotrin)  81 mg PO DAILY [facility]


   Last Admin: 11/18/16 08:20 Dose:  81 mg


Divalproex Sodium (Depakote Dr(*Bid*))  500 mg PO BID [facility]


   Last Admin: 11/18/16 16:21 Dose:  500 mg


Enalapril Maleate (Vasotec)  10 mg PO DAILY [facility]


   Last Admin: 11/18/16 08:20 Dose:  10 mg


Famotidine (Pepcid)  20 mg PO BID [facility]


   Last Admin: 11/18/16 16:21 Dose:  20 mg


Ibuprofen (Motrin Tab)  600 mg PO Q8 PRN


   PRN Reason: Pain, moderate (4-7)


   Last Admin: 10/31/16 08:42 Dose:  600 mg


Metoprolol Tartrate (Lopressor)  50 mg PO Q12 [facility]


   Last Admin: 11/18/16 21:29 Dose:  50 mg


Quetiapine Fumarate (Seroquel)  25 mg PO HS [facility]


   Last Admin: 11/18/16 21:29 Dose:  25 mg











- Labs


Labs: 


 





 08/02/16 08:10 





 08/02/16 08:10 





 











PT  11.2 SECONDS (9.4-12.0)   12/14/15  05:20    


 


INR  1.05  (0.89-1.20)   12/14/15  05:20    


 


APTT  36.2 SECONDS (23.1-32.0)  H  12/14/15  05:20    














- Constitutional


Appears: Well, Non-toxic, No Acute Distress





- Head Exam


Head Exam: ATRAUMATIC, NORMAL INSPECTION, NORMOCEPHALIC





- Eye Exam


Eye Exam: EOMI, Normal appearance, PERRL


Pupil Exam: NORMAL ACCOMODATION, PERRL





- ENT Exam


ENT Exam: Mucous Membranes Moist, Normal Exam





- Neck Exam


Neck Exam: Full ROM, Normal Inspection.  absent: Lymphadenopathy





- Respiratory Exam


Respiratory Exam: Clear to Ausculation Bilateral, NORMAL BREATHING PATTERN





- Cardiovascular Exam


Cardiovascular Exam: REGULAR RHYTHM, +S1, +S2.  absent: Murmur





- GI/Abdominal Exam


GI & Abdominal Exam: Soft, Normal Bowel Sounds.  absent: Tenderness





- Extremities Exam


Extremities Exam: Full ROM, Normal Capillary Refill, Normal Inspection.  absent

: Joint Swelling, Pedal Edema





- Back Exam


Back Exam: NORMAL INSPECTION





- Neurological Exam


Neurological Exam: Alert, Awake, CN II-XII Intact, Normal Gait, Oriented x3





- Psychiatric Exam


Psychiatric exam: Normal Affect, Normal Mood





- Skin


Skin Exam: Dry, Intact, Normal Color, Warm





Assessment and Plan


(1) DVT prophylaxis


Status: Chronic





(2) CAD (coronary artery disease)


Status: Chronic





(3) Smoking


Status: Chronic





(4) Low back pain


Status: Chronic





(5) Scrotal edema


Status: Chronic





(6) Prediabetes


Status: Chronic





(7) Neurocognitive disorder


Status: Chronic





(8) Leg edema, right


Status: Acute








- Assessment and Plan (Free Text)


Assessment: 


(1) DVT prophylaxis


Assessment & Plan: 


scd and ae hose


ambulation


Status: Chronic





(2) CAD (coronary artery disease)


Assessment & Plan: 


cont meds


Status: Chronic





(3) Smoking


Assessment & Plan: 


nicoderm


Status: Chronic





(4) Low back pain


Assessment & Plan: 


ibuprofen prn


Status: Chronic





(5) Scrotal edema


Status: Chronic





(6) Prediabetes


Assessment & Plan: 


dietary


Status: Chronic





(7) Neurocognitive disorder


Assessment & Plan: 


cont med


spendign placement


Status: Chronic Friend

## 2019-06-09 NOTE — CP.PCM.PN
Occupational Therapy Note:  Orders received, chart reviewed and this patient has been evaluated by OT and is currently on caseload. Orders acknowledged and will f/u as appropriate for this patient.  Jonnathan Alejo, OTR/L Subjective





- Date & Time of Evaluation


Date of Evaluation: 07/08/16


Time of Evaluation: 08:49





- Subjective


Subjective: 


calm and cooperaitve, no distress, no complaints. no f/c, nn/v/d


pendnig guardianship/placement





Objective





- Vital Signs/Intake and Output


Vital Signs (last 24 hours): 


 











Temp Pulse Resp BP Pulse Ox


 


 97 F L  70   20   117/79   97 


 


 07/08/16 08:44  07/08/16 08:44  07/08/16 08:44  07/08/16 08:44  07/08/16 08:44











- Medications


Medications: 


 Current Medications





Albuterol/Ipratropium (Duoneb 3 Mg/0.5 Mg (3 Ml) Ud)  3 ml INH RQ6 PRN


   PRN Reason: Shortness of Breath


Aspirin (Ecotrin)  81 mg PO DAILY [facility]


   Last Admin: 07/07/16 08:18 Dose:  81 mg


Atorvastatin Calcium (Lipitor)  20 mg PO DAILY [facility]


   Last Admin: 07/07/16 08:18 Dose:  20 mg


Divalproex Sodium (Depakote Dr(*Bid*))  500 mg PO BID [facility]


   Last Admin: 07/07/16 16:04 Dose:  500 mg


Enalapril Maleate (Vasotec)  10 mg PO DAILY [facility]


   Last Admin: 07/07/16 08:18 Dose:  10 mg


Famotidine (Pepcid)  20 mg PO BID [facility]


   Last Admin: 07/07/16 16:04 Dose:  20 mg


Guaifenesin/Dextromethorphan (Mucinex-Dm 600-30 Mg)  1 tab PO BID [facility]


   Last Admin: 07/07/16 16:04 Dose:  1 tab


Haloperidol Lactate (Haldol)  5 mg IM Q6 PRN


   PRN Reason: Agitation


   Last Admin: 05/23/16 13:23 Dose:  5 mg


Ibuprofen (Motrin Tab)  600 mg PO Q8 PRN


   PRN Reason: Pain, moderate (4-7)


   Last Admin: 07/06/16 16:34 Dose:  600 mg


Metoprolol Tartrate (Lopressor)  50 mg PO Q12 [facility]


   Last Admin: 07/07/16 21:44 Dose:  50 mg


Nicotine (Nicoderm Cq)  1 patch TD DAILY [facility]


   Last Admin: 07/07/16 08:17 Dose:  1 patch


Quetiapine Fumarate (Seroquel)  25 mg PO HS [facility]


   Last Admin: 07/07/16 21:44 Dose:  25 mg











- Labs


Labs: 


 





 06/23/16 06:40 





 06/23/16 06:40 





 











PT  11.2 SECONDS (9.4-12.0)   12/14/15  05:20    


 


INR  1.05  (0.89-1.20)   12/14/15  05:20    


 


APTT  36.2 SECONDS (23.1-32.0)  H  12/14/15  05:20    














- Constitutional


Appears: Well, Non-toxic, No Acute Distress





- Head Exam


Head Exam: ATRAUMATIC, NORMAL INSPECTION, NORMOCEPHALIC





- Eye Exam


Eye Exam: EOMI, Normal appearance, PERRL


Pupil Exam: NORMAL ACCOMODATION, PERRL





- ENT Exam


ENT Exam: Mucous Membranes Moist, Normal Exam





- Neck Exam


Neck Exam: Full ROM, Normal Inspection.  absent: Lymphadenopathy





- Respiratory Exam


Respiratory Exam: Clear to Ausculation Bilateral, NORMAL BREATHING PATTERN





- Cardiovascular Exam


Cardiovascular Exam: REGULAR RHYTHM, +S1, +S2.  absent: Murmur





- GI/Abdominal Exam


GI & Abdominal Exam: Soft, Normal Bowel Sounds.  absent: Tenderness





- Extremities Exam


Extremities Exam: Full ROM, Normal Capillary Refill, Normal Inspection.  absent

: Joint Swelling, Pedal Edema





- Back Exam


Back Exam: NORMAL INSPECTION





- Neurological Exam


Neurological Exam: Alert, Awake, CN II-XII Intact, Normal Gait, Oriented x3





- Psychiatric Exam


Psychiatric exam: Normal Affect, Normal Mood





- Skin


Skin Exam: Dry, Intact, Normal Color, Warm





Assessment and Plan


(1) DVT prophylaxis


Status: Acute





(2) CAD (coronary artery disease)


Status: Acute





(3) Smoking


Status: Chronic





(4) Low back pain


Status: Chronic





(5) Agitation


Status: Acute





(6) Scrotal edema


Status: Chronic





(7) Prediabetes


Status: Acute








- Assessment and Plan (Free Text)


Assessment: 


coronary artery disease, status post cardiac arrest.  Continue all medication.  

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  The patient has been calm and 

cooperative recently and has not been agitated.  Continue Seroquel, Depakote, 

Ativan and Haldol as needed for severe agitation.  These have not been needed 

in recent time; however, we will keep as p.r.n. as the patient has been violent 

in the past.  Cough, congestion.  Chest x-ray is negative, no further cough or 

congestion.  We will continue Mucinex and albuterol p.r.n. We will monitor 

patient for fevers.  Deep venous thrombosis prophylaxis sequential compression 

devices, AE hose, ambulation.  Nicoderm for smoking cessation, ibuprofen p.r.n. 

back pain.  We will discharge the patient when guardianship is established and 

the patient is safe for discharge

## 2020-02-19 NOTE — CP.PCM.PN
Subjective





- Date & Time of Evaluation


Date of Evaluation: 06/25/16


Time of Evaluation: 11:00





- Subjective


Subjective: 


Patient seen and examined bedside. Lying in bed in NAD. Hemodynamically stable, 

afebrile. No acute issues overnight.


Social hold.





Objective





- Vital Signs/Intake and Output


Vital Signs (last 24 hours): 


 











Temp Pulse Resp BP Pulse Ox


 


 96.8 F L  71   20   114/71   96 


 


 06/25/16 08:28  06/25/16 08:38  06/25/16 08:28  06/25/16 08:38  06/25/16 08:28











- Medications


Medications: 


 Current Medications





Aspirin (Ecotrin)  81 mg PO DAILY [facility]


   Last Admin: 06/25/16 08:39 Dose:  81 mg


Divalproex Sodium (Depakote Dr(*Bid*))  500 mg PO BID [facility]


   Last Admin: 06/25/16 08:40 Dose:  500 mg


Enalapril Maleate (Vasotec)  10 mg PO DAILY [facility]


   Last Admin: 06/25/16 08:38 Dose:  10 mg


Famotidine (Pepcid)  20 mg PO BID [facility]


   Last Admin: 06/25/16 08:37 Dose:  20 mg


Haloperidol Lactate (Haldol)  5 mg IM Q6 PRN


   PRN Reason: Agitation


   Last Admin: 05/23/16 13:23 Dose:  5 mg


Ibuprofen (Motrin Tab)  600 mg PO Q8 PRN


   PRN Reason: Pain, moderate (4-7)


   Last Admin: 06/14/16 08:12 Dose:  600 mg


Lorazepam (Ativan)  2 mg IM Q6 PRN


   PRN Reason: Agitation


Metoprolol Tartrate (Lopressor)  50 mg PO Q12 [facility]


   Last Admin: 06/25/16 08:38 Dose:  50 mg


Nicotine (Nicoderm Cq)  1 patch TD DAILY [facility]


   Last Admin: 06/25/16 08:38 Dose:  1 patch


Quetiapine Fumarate (Seroquel)  25 mg PO HS [facility]


   Last Admin: 06/24/16 21:36 Dose:  25 mg











- Labs


Labs: 


 





 06/23/16 06:40 





 06/23/16 06:40 





 











PT  11.2 SECONDS (9.4-12.0)   12/14/15  05:20    


 


INR  1.05  (0.89-1.20)   12/14/15  05:20    


 


APTT  36.2 SECONDS (23.1-32.0)  H  12/14/15  05:20    














- Constitutional


Appears: Non-toxic, No Acute Distress





- Head Exam


Head Exam: ATRAUMATIC, NORMOCEPHALIC





- Eye Exam


Eye Exam: EOMI, Normal appearance, PERRL


Pupil Exam: NORMAL ACCOMODATION





- ENT Exam


ENT Exam: Mucous Membranes Moist, Normal Exam





- Neck Exam


Neck Exam: Full ROM, Normal Inspection





- Respiratory Exam


Respiratory Exam: Clear to Ausculation Bilateral, NORMAL BREATHING PATTERN.  

absent: Wheezes





- Cardiovascular Exam


Cardiovascular Exam: REGULAR RHYTHM.  absent: JVD





- GI/Abdominal Exam


GI & Abdominal Exam: Soft.  absent: Tenderness





- Rectal Exam


Rectal Exam: Deferred





- Extremities Exam


Extremities Exam: Normal Inspection.  absent: Pedal Edema





- Neurological Exam


Neurological Exam: Alert, Awake, CN II-XII Intact





- Psychiatric Exam


Psychiatric exam: Normal Affect





- Skin


Skin Exam: Dry, Warm





Assessment and Plan





- Assessment and Plan (Free Text)


Assessment: 


56 y M homeless, brought in after having a cardiac arrest in [place].  Patient 

is medically stable but has severe cognitive impairment with moments of 

agitation which makes his discharge unsafe. Awaiting guardianship.





1. Cognitive Impairment with agitation - currently patient is cooperative and 

calm


-on Seroquel, Depakote and Ativan 


-Psychiatry consulted 


-patient awaiting guardianship








2.Cardiac Arrest


-doing well, s/p extensive ICU admission


-s/p cardiology input





3.HTN


-controlled with Metoprolol and Enalapril





4.Tobacco Abuse


-on Nicotine patch





5. CAD 


-on ASA, BB, ACEi, statin


-as noted per cardiology to have had a NSTEMI (with echo findings and elevated 

trop) as possible etiology of Cardiac arrest


-Echo - 12/2015 - LVEF~40% , systolic function is moderately impaired mild 

septal hypokinesis, borderline LV concentric hypertrophy





6. Hx of Seizure


-cont Keppra





7.DVT prophylaxis 


-ambulating in unit


-SCDs prn ambulatory

## 2020-05-23 NOTE — CP.PCM.PN
Subjective





- Date & Time of Evaluation


Date of Evaluation: 04/02/17


Time of Evaluation: 09:22





- Subjective


Subjective: 


no complaints/distress


no f/c, nv/d


pending palcement





Objective





- Vital Signs/Intake and Output


Vital Signs (last 24 hours): 


 











Temp Pulse Resp BP Pulse Ox


 


 97.7 F   75   18   108/75   98 


 


 04/02/17 07:51  04/02/17 07:51  04/02/17 07:51  04/02/17 07:51  04/02/17 07:51











- Medications


Medications: 


 Current Medications





Aspirin (Ecotrin)  81 mg PO DAILY [facility]


   Last Admin: 04/01/17 09:47 Dose:  81 mg


Atorvastatin Calcium (Lipitor)  20 mg PO HS [facility]


   Last Admin: 04/01/17 21:24 Dose:  20 mg


Divalproex Sodium (Depakote Dr(*Bid*))  500 mg PO BID [facility]


   Last Admin: 04/01/17 18:28 Dose:  500 mg


Docusate Sodium (Colace)  100 mg PO BID [facility]


   Last Admin: 04/01/17 18:27 Dose:  100 mg


Enalapril Maleate (Vasotec)  10 mg PO DAILY [facility]


   Last Admin: 04/01/17 09:48 Dose:  Not Given


Famotidine (Pepcid)  20 mg PO BID [facility]


   Last Admin: 04/01/17 18:28 Dose:  20 mg


Fenofibrate (Tricor)  145 mg PO DAILY [facility]


   Last Admin: 04/01/17 09:48 Dose:  145 mg


Metoprolol Tartrate (Lopressor)  50 mg PO Q12 [facility]


   Last Admin: 04/01/17 21:24 Dose:  50 mg


Quetiapine Fumarate (Seroquel)  25 mg PO HS [facility]


   Last Admin: 04/01/17 21:24 Dose:  25 mg











- Labs


Labs: 


 





 03/23/17 05:45 





 03/23/17 05:45 





 











PT  11.2 SECONDS (9.4-12.0)   12/14/15  05:20    


 


INR  1.05  (0.89-1.20)   12/14/15  05:20    


 


APTT  36.2 SECONDS (23.1-32.0)  H  12/14/15  05:20    














- Constitutional


Appears: Well, Non-toxic, No Acute Distress





- Head Exam


Head Exam: ATRAUMATIC, NORMAL INSPECTION, NORMOCEPHALIC





- Eye Exam


Eye Exam: EOMI, Normal appearance, PERRL


Pupil Exam: NORMAL ACCOMODATION, PERRL





- ENT Exam


ENT Exam: Mucous Membranes Moist, Normal Exam





- Neck Exam


Neck Exam: Full ROM, Normal Inspection.  absent: Lymphadenopathy





- Respiratory Exam


Respiratory Exam: Clear to Ausculation Bilateral, NORMAL BREATHING PATTERN





- Cardiovascular Exam


Cardiovascular Exam: REGULAR RHYTHM, RRR, +S1, +S2.  absent: Murmur





- GI/Abdominal Exam


GI & Abdominal Exam: Soft, Normal Bowel Sounds.  absent: Tenderness





- Rectal Exam


Rectal Exam: NORMAL INSPECTION





- Extremities Exam


Extremities Exam: Full ROM, Normal Capillary Refill, Normal Inspection.  absent

: Joint Swelling, Pedal Edema





- Back Exam


Back Exam: NORMAL INSPECTION





- Neurological Exam


Neurological Exam: Alert, Awake, CN II-XII Intact, Normal Gait, Oriented x3





- Psychiatric Exam


Psychiatric exam: Normal Affect, Normal Mood





- Skin


Skin Exam: Dry, Intact, Normal Color, Warm





Assessment and Plan


(1) DVT prophylaxis


Status: Chronic





(2) CAD (coronary artery disease)


Status: Chronic





(3) Smoking


Status: Chronic





(4) Low back pain


Status: Chronic





(5) Scrotal edema


Status: Chronic





(6) Prediabetes


Status: Chronic





(7) Neurocognitive disorder


Status: Chronic








- Assessment and Plan (Free Text)


Assessment: 


cont meds intervention safety


pendign palcement Full Constricted

## 2021-09-09 NOTE — CP.PCM.PN
CC: Jake Raymond is a 72 y.o. yo female is here for evaluation evaluation for the following acute & chronic medical concerns: Hypertension (3 mo med ck), Hypothyroidism, Flu Vaccine (wants), and Pharyngitis (has allergies x 5 days)      HPI:    HTN - on benicar and atenolol ---- she was getting light headed and dizzy at times and had loc  before stopping her old rx for hctz;  today she is hypertensive again in the office but not with symptoms  HLD - hx of elevated CK on statin; on zetia;  ASCVD 19.3%  Anxiety / depression  - controlled on lexapro  GERD - on prilosec  Hashimoto's / thyroid nodule - on synthroid 100mcg 6x per week and 1/2 tab 1 day, follows with endo  Hx of Lung cancer x2 / COPD - Follows with onc; Quite smoking 2007; Diagnosed with lung ca 2008; hx of R upper and middle lobectomy  Chronic cough - On long term tessalon; currently controlled with inhalers  Chronic pain - Lumbar pain and hip pain; did see PMR; Dr. Esposito Forward; controlled on lyrica; I am prescriber  Pharyngitis     PDMP Monitoring:    Last PDMP [place] Records as Reviewed [facility]):  Review User Review Instant Review Result   Rene Cole 9/9/2021  5:28 PM Reviewed PDMP [1]     Last Controlled Substance Monitoring Documentation      Virtual Visit from 10/21/2020 in [address]   Periodic Controlled Substance Monitoring  No signs of potential drug abuse or diversion identified. , Assessed functional status.  filed at 10/21/2020 1546        Urine Drug Screenings (1 yr)     URINE DRUG SCREEN  Collected: 11/3/2020  7:45 AM (Final result)    Complete Results          Opiate, quantitative, urine  Collected: 1/21/2020 10:00 AM (Final result)    Narrative: Pain Management Panel;  Screen w/Reflex to Quantitation (ARUP Code [id number])    Complete Results          Benzodiazepine, Quantitative, Urine  Collected: 1/21/2020 10:00 AM (Final result)    Narrative: Pain Management Panel;  Screen w/Reflex to Quantitation (ARUP Code [id number]) Subjective





- Subjective


Subjective: 


no new clinical change.





Review of Systems





- Review of Systems


Systems not reviewed;Unavailable: Intubated





Objective





- Vital Signs/Intake and Output


Vital Signs (last 24 hours): 


 Vital Signs - 24 hr











  12/16/15 12/16/15 12/16/15





  18:00 19:00 20:00


 


Temperature  100.3 F H 101.5 F H


 


Pulse Rate 100 H 99 H 99 H


 


Respiratory 19 17 16





Rate   


 


Blood Pressure 145/88 147/58 L 159/99 H


 


O2 Sat by Pulse 99 100 100





Oximetry   














  12/16/15 12/16/15 12/16/15





  21:00 22:00 22:37


 


Temperature 100.9 F H 100.3 F H 100.8 F H


 


Pulse Rate 95 H 97 H 94 H


 


Respiratory 18 18 17





Rate   


 


Blood Pressure 148/99 H 150/89 149/91 H


 


O2 Sat by Pulse 100 100 100





Oximetry   














  12/16/15 12/17/15 12/17/15





  23:58 01:00 02:00


 


Temperature 101.0 F H 100.3 F H 100.3 F H


 


Pulse Rate 92 H 92 H 88


 


Respiratory 16 15 18





Rate   


 


Blood Pressure 150/69 150/83 133/83


 


O2 Sat by Pulse 98 99 99





Oximetry   














  12/17/15 12/17/15 12/17/15





  03:00 04:00 05:00


 


Temperature 100.3 F H 100.1 F H 99.9 F H


 


Pulse Rate 85 90 94 H


 


Respiratory 16 16 16





Rate   


 


Blood Pressure 136/82 129/77 133/76


 


O2 Sat by Pulse 100 99 99





Oximetry   














  12/17/15 12/17/15 12/17/15





  05:43 06:48 08:00


 


Temperature 99.8 F H 100.0 F H 99.8 F H


 


Pulse Rate 95 H 90 88


 


Respiratory 17 16 14





Rate   


 


Blood Pressure 142/87 146/88 149/86


 


O2 Sat by Pulse 100 100 99





Oximetry   














  12/17/15 12/17/15 12/17/15





  10:32 12:00 14:00


 


Temperature  98 F 


 


Pulse Rate  90 92 H


 


Respiratory  14 16





Rate   


 


Blood Pressure 140/84 144/89 144/87


 


O2 Sat by Pulse  100 100





Oximetry   














  12/17/15





  16:00


 


Temperature 98.8 F


 


Pulse Rate 101 H


 


Respiratory 20





Rate 


 


Blood Pressure 156/109 H


 


O2 Sat by Pulse 99





Oximetry 











Intake and Output (last 12 hours): 


 Intake & Output











 12/16/15 12/17/15 12/17/15





 18:59 06:59 18:59


 


Intake Total 1860 2002 1600


 


Output Total 


 


Balance 1160 1392 -425


 


Intake:   


 


   876 75


 


  Intake, Piggyback  286 700


 


  Oral  210 


 


  Tube Feeding 660 330 525


 


  Free Water Flush 300 300 300


 


Output:   


 


  Urine 


 


    Urethral (Lua) 


 


  Stool  30 














- Medications


Medications: 


 Current Medications





Acetaminophen (Tylenol 650mg/20.3ml Solution Ud)  650 mg PO Q6 PRN


   PRN Reason: Fever


   Last Admin: 12/16/15 22:56 Dose:  650 mg


Acetaminophen (Tylenol 650 Mg Supp)  650 mg MS Q6 PRN


   PRN Reason: fevers


   Last Admin: 12/14/15 06:53 Dose:  650 mg


Albuterol/Ipratropium (Duoneb 3 Mg/0.5 Mg (3 Ml) Ud)  3 ml INH RQ6 MAGDALENA


Aspirin (Ecotrin)  81 mg PO DAILY [facility]


   Last Admin: 12/17/15 09:19 Dose:  81 mg


Enalapril Maleate (Vasotec)  10 mg PO DAILY [facility]


   Last Admin: 12/17/15 09:24 Dose:  10 mg


Enoxaparin Sodium (Lovenox)  40 mg SC DAILY [facility]


   Last Admin: 12/17/15 09:20 Dose:  40 mg


Famotidine (Pepcid)  20 mg GT BID [facility]


   Last Admin: 12/17/15 09:22 Dose:  20 mg


Vancomycin HCl 1 gm/ Sodium (Chloride)  250 mls @ 125 mls/hr IVPB Q12 [facility]


   Last Admin: 12/17/15 09:24 Dose:  125 mls/hr


Ceftriaxone Sodium 2 gm/ (Sodium Chloride)  100 mls @ 100 mls/hr IVPB DAILY [facility]


   Last Admin: 12/17/15 09:23 Dose:  100 mls/hr


Levetiracetam 500 mg/ Sodium (Chloride)  105 mls @ 210 mls/hr IVPB Q12 [facility]


   Last Admin: 12/17/15 09:20 Dose:  210 mls/hr


Insulin Human Regular (Humulin R)  0 units SC ACHS MAGDALENA


   PRN Reason: Protocol


   Last Admin: 12/17/15 13:51 Dose:  Not Given


Lorazepam (Ativan)  2 mg IVP Q4 PRN


   PRN Reason: Agitation


   Last Admin: 12/17/15 09:15 Dose:  2 mg


Morphine Sulfate (Morphine)  4 mg IVP Q4 PRN


   PRN Reason: Agitation


   Last Admin: 12/17/15 11:22 Dose:  4 mg


Multivitamins/Vitamin C (Multi-Delyn Liquid)  5 ml PO DAILY [facility]


   Last Admin: 12/17/15 09:20 Dose:  5 ml


Nitroglycerin (Nitro-Bid 2% Oint)  2 in TOP Q6 [facility]


   Last Admin: 12/17/15 09:21 Dose:  2 in











- Labs


Labs (last 24 hours): 


 Laboratory Results - last 24 hr











  12/16/15 12/16/15 12/17/15





  16:31 21:46 04:00


 


WBC    8.9


 


RBC    2.87 L


 


Hgb    9.4 L


 


Hct    29.8 L


 


MCV    103.7 H


 


MCH    32.7 H


 


MCHC    31.5 L


 


RDW    13.8


 


Plt Count    298


 


MPV    7.6


 


Neut % (Auto)    72.8


 


Lymph % (Auto)    12.4 L


 


Mono % (Auto)    9.0


 


Eos % (Auto)    5.4 H


 


Baso % (Auto)    0.4


 


Neut #    6.5


 


Lymph #    1.1


 


Mono #    0.8


 


Eos #    0.5


 


Baso #    0.0


 


pCO2   


 


pO2   


 


HCO3   


 


ABG pH   


 


ABG Total CO2   


 


ABG O2 Saturation   


 


ABG O2 Content   


 


ABG Base Excess   


 


ABG Hemoglobin   


 


ABG Carboxyhemoglobin   


 


POC ABG HHb (Measured)   


 


ABG Methemoglobin   


 


ABG O2 Capacity   


 


ABG Lactate   


 


A-a O2 Difference   


 


Hgb O2 Saturation   


 


FiO2   


 


Blood Gas Comments   


 


Blood Gas Notified Time   


 


Sodium    147


 


Potassium    3.0 L


 


Chloride    110 H


 


Carbon Dioxide    27


 


Anion Gap    13


 


BUN    16


 


Creatinine    1.1


 


Est GFR ( Amer)    > 60


 


Est GFR (Non-Af Amer)    > 60


 


POC Glucose (mg/dL)  100  102 


 


Random Glucose    104


 


Calcium    8.2 L


 


Total Bilirubin    0.5


 


AST    39


 


ALT    37


 


Alkaline Phosphatase    110


 


Troponin I   


 


Total Protein    6.0 L


 


Albumin    2.7 L


 


Globulin    3.3


 


Albumin/Globulin Ratio    0.8 L


 


Vancomycin Trough    14.2 H














  12/17/15 12/17/15 12/17/15





  05:20 05:53 10:37


 


WBC   


 


RBC   


 


Hgb   


 


Hct   


 


MCV   


 


MCH   


 


MCHC   


 


RDW   


 


Plt Count   


 


MPV   


 


Neut % (Auto)   


 


Lymph % (Auto)   


 


Mono % (Auto)   


 


Eos % (Auto)   


 


Baso % (Auto)   


 


Neut #   


 


Lymph #   


 


Mono #   


 


Eos #   


 


Baso #   


 


pCO2  40  


 


pO2  81  


 


HCO3  28.8 H  


 


ABG pH  7.47 H  


 


ABG Total CO2  30.3 H  


 


ABG O2 Saturation  97.0  


 


ABG O2 Content  14.2 L  


 


ABG Base Excess  5.0 H  


 


ABG Hemoglobin  10.6 L  


 


ABG Carboxyhemoglobin  1.6 H  


 


POC ABG HHb (Measured)  2.9  


 


ABG Methemoglobin  0.6  


 


ABG O2 Capacity  14.6 L  


 


ABG Lactate  0.7  


 


A-a O2 Difference  226.0  


 


Hgb O2 Saturation  94.9 L  


 


FiO2  50.0  


 


Blood Gas Comments  333  


 


Blood Gas Notified Time  547  


 


Sodium   


 


Potassium   


 


Chloride   


 


Carbon Dioxide   


 


Anion Gap   


 


BUN   


 


Creatinine   


 


Est GFR ( Amer)   


 


Est GFR (Non-Af Amer)   


 


POC Glucose (mg/dL)   105 


 


Random Glucose   


 


Calcium   


 


Total Bilirubin   


 


AST   


 


ALT   


 


Alkaline Phosphatase   


 


Troponin I    0.0720


 


Total Protein   


 


Albumin   


 


Globulin   


 


Albumin/Globulin Ratio   


 


Vancomycin Trough   














  12/17/15 12/17/15





  10:51 17:10


 


WBC  


 


RBC  


 


Hgb  


 


Hct  


 


MCV  


 


MCH  


 


MCHC  


 


RDW  


 


Plt Count  


 


MPV  


 


Neut % (Auto)  


 


Lymph % (Auto)  


 


Mono % (Auto)  


 


Eos % (Auto)  


 


Baso % (Auto)  


 


Neut #  


 


Lymph #  


 


Mono #  


 


Eos #  


 


Baso #  


 


pCO2  


 


pO2  


 


HCO3  


 


ABG pH  


 


ABG Total CO2  


 


ABG O2 Saturation  


 


ABG O2 Content  


 


ABG Base Excess  


 


ABG Hemoglobin  


 


ABG Carboxyhemoglobin  


 


POC ABG HHb (Measured)  


 


ABG Methemoglobin  


 


ABG O2 Capacity  


 


ABG Lactate  


 


A-a O2 Difference  


 


Hgb O2 Saturation  


 


FiO2  


 


Blood Gas Comments  


 


Blood Gas Notified Time  


 


Sodium  


 


Potassium  


 


Chloride  


 


Carbon Dioxide  


 


Anion Gap  


 


BUN  


 


Creatinine  


 


Est GFR ( Amer)  


 


Est GFR (Non-Af Amer)  


 


POC Glucose (mg/dL)  93  102


 


Random Glucose  


 


Calcium  


 


Total Bilirubin  


 


AST  


 


ALT  


 


Alkaline Phosphatase  


 


Troponin I  


 


Total Protein  


 


Albumin  


 


Globulin  


 


Albumin/Globulin Ratio  


 


Vancomycin Trough  














- Constitutional


Appears: Chronically Ill





- Head Exam


Head Exam: NORMAL INSPECTION





- Eye Exam


Eye Exam: Normal appearance





- ENT Exam


ENT Exam: Mucous Membranes Moist





- Neck Exam


Neck exam: absent: Thyromegaly





- Respiratory Exam


Respiratory Exam: Decreased Breath Sounds





- Cardiovascular Exam


Cardiovascular Exam: REGULAR RHYTHM





- GI/Abdominal Exam


GI & Abdominal Exam: Normal Bowel Sounds





- Rectal Exam


Rectal Exam: Deferred





- Extremities Exam


Extremities exam: pedal edema





- Skin


Skin Exam: Normal Color





Assessment/Plan


(1) Cardiac arrest


Assessment and plan: 


unclear etiology.  Recommend serial cardiac enzymes.  Check troponin.  check 

echocardiogram. 





Patient has no current mental status.  I advise conservative medical therapy. 

No plans for cardiac cath.








Current Visit: Yes   Status: Acute





(2) NSTEMI (non-ST elevated myocardial infarction)


Assessment and plan: 


Patient may have underlying CAD as a cause of his acute event and cardiac 

arrest.  





Conservative therapy.


Current Visit: Yes   Status: Acute





(3) Ventricular tachycardia


Assessment and plan: 


if recurrence recommend amiodarone drip.


Current Visit: Yes   Status: Acute MG capsule   12. Other seasonal allergic rhinitis  montelukast (SINGULAIR) 10 MG tablet   13. Vitamin D deficiency     14. Vitamin D deficiency, unspecified  Vitamin D 25 Hydroxy   15. Hyperlipidemia, unspecified hyperlipidemia type     16. Hypothyroidism, unspecified type     17. Malignant neoplasm of lower lobe of right lung ([address]           F/u in 3 months for lyrica refills; she will call in monthly for refills  She prefers to come back in 2 weeks for bp check before making another adjustment  covid ambulatory  Conservative measures    RTO: Return in about 3 months (around 12/9/2021) for lyrica refill; 2 week bp check. On this date 9/9/2021 I have spent 46 minutes reviewing previous notes, test results and face to face with the patient discussing the diagnosis and importance of compliance with the treatment plan as well as documenting on the day of the visit.         An electronic signature was used to authenticate this note.  ---- Elyse Varma MD on 9/9/2021 at 5:32 PM

## 2021-09-21 NOTE — CP.PCM.PN
Subjective





- Date & Time of Evaluation


Date of Evaluation: 09/26/16


Time of Evaluation: 07:15





- Subjective


Subjective: 


no f/c, n/v/d


viktor whitaker cooeprative


pending plcement





Objective





- Vital Signs/Intake and Output


Vital Signs (last 24 hours): 


 











Temp Pulse Resp BP Pulse Ox


 


 97.9 F   73   20   103/63   97 


 


 09/26/16 00:50  09/26/16 00:50  09/26/16 00:50  09/26/16 00:50  09/26/16 00:50











- Medications


Medications: 


 Current Medications





Aspirin (Ecotrin)  81 mg PO DAILY [facility]


   Last Admin: 09/25/16 09:09 Dose:  81 mg


Divalproex Sodium (Depakote Dr(*Bid*))  500 mg PO BID [facility]


   Last Admin: 09/25/16 16:54 Dose:  500 mg


Enalapril Maleate (Vasotec)  10 mg PO DAILY [facility]


   Last Admin: 09/25/16 09:09 Dose:  10 mg


Famotidine (Pepcid)  20 mg PO BID [facility]


   Last Admin: 09/25/16 16:54 Dose:  20 mg


Ibuprofen (Motrin Tab)  600 mg PO Q8 PRN


   PRN Reason: Pain, moderate (4-7)


   Last Admin: 09/18/16 08:56 Dose:  600 mg


Metoprolol Tartrate (Lopressor)  50 mg PO Q12 [facility]


   Last Admin: 09/25/16 22:14 Dose:  50 mg


Nicotine (Nicoderm Cq)  1 patch TD DAILY [facility]


   Last Admin: 09/25/16 09:08 Dose:  1 patch


Quetiapine Fumarate (Seroquel)  25 mg PO HS [facility]


   Last Admin: 09/25/16 22:16 Dose:  25 mg











- Labs


Labs: 


 





 08/02/16 08:10 





 08/02/16 08:10 





 











PT  11.2 SECONDS (9.4-12.0)   12/14/15  05:20    


 


INR  1.05  (0.89-1.20)   12/14/15  05:20    


 


APTT  36.2 SECONDS (23.1-32.0)  H  12/14/15  05:20    














- Constitutional


Appears: Well, Non-toxic, No Acute Distress





- Head Exam


Head Exam: ATRAUMATIC, NORMAL INSPECTION, NORMOCEPHALIC





- Eye Exam


Eye Exam: EOMI, Normal appearance, PERRL


Pupil Exam: NORMAL ACCOMODATION, PERRL





- ENT Exam


ENT Exam: Mucous Membranes Moist, Normal Exam





- Neck Exam


Neck Exam: Full ROM, Normal Inspection.  absent: Lymphadenopathy





- Respiratory Exam


Respiratory Exam: Clear to Ausculation Bilateral, NORMAL BREATHING PATTERN





- Cardiovascular Exam


Cardiovascular Exam: REGULAR RHYTHM, +S1, +S2.  absent: Murmur





- GI/Abdominal Exam


GI & Abdominal Exam: Soft, Normal Bowel Sounds.  absent: Tenderness





- Extremities Exam


Extremities Exam: Full ROM, Normal Capillary Refill, Normal Inspection.  absent

: Joint Swelling, Pedal Edema





- Back Exam


Back Exam: NORMAL INSPECTION





- Neurological Exam


Neurological Exam: Alert, Awake, CN II-XII Intact, Normal Gait, Oriented x3





- Psychiatric Exam


Psychiatric exam: Normal Affect, Normal Mood





- Skin


Skin Exam: Dry, Intact, Normal Color, Warm





Assessment and Plan


(1) DVT prophylaxis


Assessment & Plan: 


scd jazmín e hose


ambulation


Status: Chronic





(2) CAD (coronary artery disease)


Assessment & Plan: 


cont meds


Status: Chronic





(3) Smoking


Assessment & Plan: 


nicoderm


Status: Chronic





(4) Low back pain


Assessment & Plan: 


ibuprofen prn


Status: Chronic





(5) Scrotal edema


Status: Chronic





(6) Prediabetes


Assessment & Plan: 


dietary


Status: Chronic





(7) Neurocognitive disorder


Assessment & Plan: 


cont meds


pending placement


Status: Chronic soft/nondistended/nontender

## 2022-05-26 NOTE — CP.PCM.PN
Patient achieved a 15% reduction in IOP from pretreatment levels or a plan is in place to achieve this goal. Subjective





- Date & Time of Evaluation


Date of Evaluation: 03/31/17


Time of Evaluation: 09:24





- Subjective


Subjective: 


no complaints/distress


nof /c, n/v/d


penidng placement





Objective





- Vital Signs/Intake and Output


Vital Signs (last 24 hours): 


 











Temp Pulse Resp BP Pulse Ox


 


 97.5 F L  66   18   108/75   100 


 


 03/31/17 08:45  03/31/17 08:45  03/31/17 08:45  03/31/17 08:45  03/31/17 08:45











- Medications


Medications: 


 Current Medications





Aspirin (Ecotrin)  81 mg PO DAILY [facility]


   Last Admin: 03/31/17 08:36 Dose:  81 mg


Atorvastatin Calcium (Lipitor)  20 mg PO HS [facility]


   Last Admin: 03/30/17 21:50 Dose:  20 mg


Divalproex Sodium (Depakote Dr(*Bid*))  500 mg PO BID [facility]


   Last Admin: 03/31/17 08:35 Dose:  500 mg


Docusate Sodium (Colace)  100 mg PO BID [facility]


   Last Admin: 03/31/17 08:35 Dose:  100 mg


Enalapril Maleate (Vasotec)  10 mg PO DAILY [facility]


   Last Admin: 03/31/17 08:36 Dose:  10 mg


Famotidine (Pepcid)  20 mg PO BID [facility]


   Last Admin: 03/31/17 08:36 Dose:  20 mg


Fenofibrate (Tricor)  145 mg PO DAILY [facility]


   Last Admin: 03/31/17 08:35 Dose:  145 mg


Ibuprofen (Motrin Tab)  600 mg PO Q8 PRN


   PRN Reason: Pain, moderate (4-7)


   Last Admin: 01/06/17 16:32 Dose:  600 mg


Metoprolol Tartrate (Lopressor)  50 mg PO Q12 [facility]


   Last Admin: 03/31/17 08:36 Dose:  50 mg


Quetiapine Fumarate (Seroquel)  25 mg PO HS [facility]


   Last Admin: 03/30/17 21:50 Dose:  25 mg











- Labs


Labs: 


 





 03/23/17 05:45 





 03/23/17 05:45 





 











PT  11.2 SECONDS (9.4-12.0)   12/14/15  05:20    


 


INR  1.05  (0.89-1.20)   12/14/15  05:20    


 


APTT  36.2 SECONDS (23.1-32.0)  H  12/14/15  05:20    














- Constitutional


Appears: Well, Non-toxic, No Acute Distress





- Head Exam


Head Exam: ATRAUMATIC, NORMAL INSPECTION, NORMOCEPHALIC





- Eye Exam


Eye Exam: EOMI, Normal appearance, PERRL


Pupil Exam: NORMAL ACCOMODATION, PERRL





- ENT Exam


ENT Exam: Mucous Membranes Moist, Normal Exam





- Neck Exam


Neck Exam: Full ROM, Normal Inspection.  absent: Lymphadenopathy





- Respiratory Exam


Respiratory Exam: Clear to Ausculation Bilateral, NORMAL BREATHING PATTERN





- Cardiovascular Exam


Cardiovascular Exam: REGULAR RHYTHM, RRR, +S1, +S2.  absent: Murmur





- GI/Abdominal Exam


GI & Abdominal Exam: Soft, Normal Bowel Sounds.  absent: Tenderness





- Extremities Exam


Extremities Exam: Full ROM, Normal Capillary Refill, Normal Inspection.  absent

: Joint Swelling, Pedal Edema





- Back Exam


Back Exam: NORMAL INSPECTION





- Neurological Exam


Neurological Exam: Alert, Awake, CN II-XII Intact, Normal Gait, Oriented x3





- Psychiatric Exam


Psychiatric exam: Normal Affect, Normal Mood





- Skin


Skin Exam: Dry, Intact, Normal Color, Warm





Assessment and Plan


(1) DVT prophylaxis


Status: Chronic





(2) CAD (coronary artery disease)


Status: Chronic





(3) Smoking


Status: Chronic





(4) Low back pain


Status: Chronic





(5) Scrotal edema


Status: Chronic





(6) Prediabetes


Status: Chronic





(7) Neurocognitive disorder


Status: Chronic








- Assessment and Plan (Free Text)


Assessment: 


pending lacement


cont meds, sw, safety

## 2023-02-23 NOTE — CP.PCM.CON
History of Present Illness





- History of Present Illness


History of Present Illness: 


Psychiatry consult follow-up





Patient sleeping, writer attempted to wake up patient but he was sleepy and 

difficult to engage in interview.  Patient has continued episodes of agitation 

and aggression.  He continues to have poor insight/judgment.





Recommend:


-Continue Depakote 500 mg PO BID, check VPA level


-Increase Seroquel to 50 mg PO Q12 hr


-PRNs for acute aggression 





Past Patient History





- Past Medical History & Family History


Past Medical History?: Yes





- Past Social History


Smoking Status: Heavy Smoker > 10 Cigarettes Daily





- CARDIAC


Hx Cardiac Disorders: Yes





- NEUROLOGICAL


Hx Neurological Disorder: Yes (seizure)


Hx Seizures: Yes





- HEENT


Hx HEENT Problems: No





- RENAL


Hx Chronic Kidney Disease: No





- ENDOCRINE/METABOLIC


Hx Endocrine Disorders: No





- HEMATOLOGICAL/ONCOLOGICAL


Hx Blood Disorders: No





- INTEGUMENTARY


Hx Dermatological Problems: No





- MUSCULOSKELETAL/RHEUMATOLOGICAL


Hx Falls: No





- GENITOURINARY/GYNECOLOGICAL


Hx Genitourinary Disorders: No





- PSYCHIATRIC


Hx Substance Use: No





Meds


Home Medications: 


 Home Medication List











 Medication  Instructions  Recorded  Confirmed  Type


 


Aspirin [Ecotrin] 81 mg PO DAILY #0 tabec 03/21/16  Rx


 


Divalproex [Depakote DR(*BID*)] 500 mg PO BID #0 tcp 03/21/16  Rx


 


Enalapril Maleate [Vasotec] 10 mg PO DAILY #0 tab 03/21/16  Rx


 


Famotidine [Pepcid] 20 mg PO BID #0 tab 03/21/16  Rx


 


Ibuprofen [Motrin Tab] 600 mg PO Q8 PRN #0 tab 03/21/16  Rx


 


Metoprolol Tartrate [Lopressor] 50 mg PO Q12 #0 tab 03/21/16  Rx


 


Nicotine 21 mg/24 hr [Nicoderm Cq] 1 patch TD DAILY #0 patch 03/21/16  Rx


 


QUEtiapine [Seroquel] 25 mg PO DAILY@1700 #0 tab 03/21/16  Rx


 


levETIRAcetam [Keppra] 500 mg PO BID #0 tab 03/21/16  Rx











Allergies/Adverse Reactions: 


 Allergies











Allergy/AdvReac Type Severity Reaction Status Date / Time


 


No Known Allergies Allergy   Verified 06/02/16 00:33














- Medications


Medications: 


 Current Medications





Atorvastatin Calcium (Lipitor)  20 mg PO HS [facility]


   Last Admin: 12/17/17 22:04 Dose:  Not Given


Divalproex Sodium (Depakote Dr(*Bid*))  500 mg PO BID [facility]


   Last Admin: 12/18/17 08:52 Dose:  500 mg


Enalapril Maleate (Vasotec)  10 mg PO DAILY [facility]


   Last Admin: 12/18/17 08:52 Dose:  10 mg


Fenofibrate (Tricor)  145 mg PO DAILY [facility]


   Last Admin: 12/18/17 08:52 Dose:  145 mg


Haloperidol (Haldol)  2 mg PO Q8 PRN


   PRN Reason: Agitation


Haloperidol Lactate (Haldol)  2 mg IM Q8 PRN


   PRN Reason: Agitation


Lorazepam (Ativan)  0.5 mg PO Q8 PRN


   PRN Reason: Agitation


Lorazepam (Ativan)  0.5 mg IM Q8 PRN


   PRN Reason: Agitation


Metoprolol Tartrate (Lopressor)  50 mg PO Q12 [facility]


   Last Admin: 12/18/17 08:52 Dose:  50 mg


Quetiapine Fumarate (Seroquel)  50 mg PO Q12 [facility] of [facility]


   Last Admin: 12/18/17 08:54 Dose:  50 mg











Results





- Vital Signs


Recent Vital Signs: 


 Last Vital Signs











Temp  98.1 F   12/18/17 07:43


 


Pulse  88   12/18/17 08:52


 


Resp  20   12/18/17 07:43


 


BP  124/75   12/18/17 08:52


 


Pulse Ox  96   12/18/17 07:43














- Labs


Result Diagrams: 


 10/28/17 05:30





 10/28/17 05:30 4 (moderate pain)

## 2023-04-14 NOTE — CP.PCM.PN
Patient advise prescription called in   Subjective





- Date & Time of Evaluation


Date of Evaluation: 11/21/17


Time of Evaluation: 07:40





- Subjective


Subjective: 





s/o no f/c, n/v/d. no other complaints.calm and cooperative. no abd pain, 

finished anbx


a/p cont plan, cont placement, cont all medical management





Objective





- Vital Signs/Intake and Output


Vital Signs (last 24 hours): 


 











Temp Pulse Resp BP Pulse Ox


 


 97.3 F L  65   18   109/69   97 


 


 11/21/17 07:35  11/21/17 07:35  11/21/17 07:35  11/21/17 07:35  11/21/17 07:35











- Medications


Medications: 


 Current Medications





Atorvastatin Calcium (Lipitor)  20 mg PO HS [facility]


   Last Admin: 11/20/17 23:20 Dose:  20 mg


Divalproex Sodium (Depakote Dr(*Bid*))  500 mg PO BID [facility]


   Last Admin: 11/20/17 16:51 Dose:  500 mg


Enalapril Maleate (Vasotec)  10 mg PO DAILY [facility]


   Last Admin: 11/20/17 09:03 Dose:  Not Given


Fenofibrate (Tricor)  145 mg PO DAILY [facility]


   Last Admin: 11/20/17 09:02 Dose:  145 mg


Metoprolol Tartrate (Lopressor)  50 mg PO Q12 [facility]


   Last Admin: 11/20/17 21:19 Dose:  50 mg


Quetiapine Fumarate (Seroquel)  25 mg PO HS [facility]


   Last Admin: 11/20/17 23:20 Dose:  25 mg











- Labs


Labs: 


 





 10/28/17 05:30 





 10/28/17 05:30 





 











PT  11.2 SECONDS (9.4-12.0)   12/14/15  05:20    


 


INR  1.05  (0.89-1.20)   12/14/15  05:20    


 


APTT  36.2 SECONDS (23.1-32.0)  H  12/14/15  05:20    














- Constitutional


Appears: Well, Non-toxic, No Acute Distress





- Head Exam


Head Exam: ATRAUMATIC, NORMAL INSPECTION, NORMOCEPHALIC





- Eye Exam


Eye Exam: EOMI, Normal appearance, PERRL


Pupil Exam: NORMAL ACCOMODATION, PERRL





- ENT Exam


ENT Exam: Mucous Membranes Moist, Normal Exam





- Neck Exam


Neck Exam: Full ROM, Normal Inspection.  absent: Lymphadenopathy





- Respiratory Exam


Respiratory Exam: Clear to Ausculation Bilateral, NORMAL BREATHING PATTERN





- Cardiovascular Exam


Cardiovascular Exam: REGULAR RHYTHM, RRR, +S1, +S2.  absent: Murmur





- GI/Abdominal Exam


GI & Abdominal Exam: Soft, Normal Bowel Sounds.  absent: Tenderness





- Extremities Exam


Extremities Exam: Full ROM, Normal Capillary Refill, Normal Inspection.  absent

: Joint Swelling, Pedal Edema





- Back Exam


Back Exam: NORMAL INSPECTION





- Neurological Exam


Neurological Exam: Alert, Awake, CN II-XII Intact, Normal Gait, Oriented x3





- Psychiatric Exam


Psychiatric exam: Normal Affect, Normal Mood





- Skin


Skin Exam: Dry, Intact, Normal Color, Warm





Assessment and Plan


(1) DVT prophylaxis


Status: Chronic   





(2) Scrotal edema


Status: Chronic   





(3) CAD (coronary artery disease)


Status: Chronic   





(4) Smoking


Status: Chronic   





(5) Low back pain


Status: Chronic   





(6) Prediabetes


Status: Chronic   





(7) Neurocognitive disorder


Status: Chronic 68

## 2024-02-23 NOTE — CP.PCM.PN
Subjective





- Subjective


Subjective: 


pt comfortable and calm in bed. pt still has periods of extreme agitation/

aggressive which is requiring 1:1 and 2 point restraints.  b/w noted. vs noted. 

pending guardianship





Review of Systems





- Review of Systems


Systems not reviewed;Unavailable: Altered Mental Status





- Constitutional


Constitutional: UN





- EENT


Eyes: UNREMARKABLE


Ears: UNREMARKABLE


Nose/Mouth/Throat: UNREMARKABLE





- Cardiovascular


Cardiovascular: UNREMARKABLE





- Respiratory


Respiratory: UNREMARKABLE





- Gastrointestinal


Gastrointestinal: UNREMARKABLE





- Genitourinary


Genitourinary: UNREMARKABLE





- Reproductive: Male


Reproductive:Male: UNREMARKABLE





- Musculoskeletal


Musculoskeletal: UNREMARKABLE





- Integumentary


Integumentary: UNREMARKABLE





- Neurological


Neurological: UNREMARKABLE





- Psychiatric


Psychiatric: UNREMARKABLE





- Endocrine


Endocrine: UNREMARKABLE





- Hematologic/Lymphatic


Hematologic: UNREMARKABLE





Objective





- Vital Signs/Intake and Output


Vital Signs (last 24 hours): 


 Vital Signs - 24 hr











  01/05/16 01/05/16 01/06/16





  17:09 21:46 00:00


 


Temperature   98.1 F


 


Pulse Rate 107 H 93 H 94 H


 


Respiratory   19





Rate   


 


Blood Pressure 148/93 H 151/93 H 157/69 H


 


O2 Sat by Pulse 100  97





Oximetry   














- Medications


Medications: 


 Current Medications





Aspirin (Ecotrin)  81 mg PO DAILY [facility]


   Last Admin: 01/05/16 09:32 Dose:  81 mg


Enalapril Maleate (Vasotec)  10 mg PO DAILY [facility]


   Last Admin: 01/05/16 09:33 Dose:  10 mg


Enoxaparin Sodium (Lovenox)  40 mg SC DAILY [facility]


   Last Admin: 01/05/16 09:32 Dose:  40 mg


Famotidine (Pepcid)  20 mg GT BID [facility]


   Last Admin: 01/05/16 17:06 Dose:  20 mg


Cefepime HCl 2 gm/ Sodium (Chloride)  100 mls @ 100 mls/hr IVPB Q12 [facility]


   Last Admin: 01/05/16 21:50 Dose:  100 mls/hr


Levetiracetam (Keppra)  500 mg PO BID [facility]


   Last Admin: 01/05/16 17:05 Dose:  500 mg


Lorazepam (Ativan)  1 mg IVP Q4 PRN


   PRN Reason: Agitation


   Last Admin: 01/05/16 22:00 Dose:  1 mg


Metoprolol Tartrate (Lopressor)  100 mg PO Q12 [facility]


   Last Admin: 01/05/16 21:46 Dose:  100 mg


Multivitamins/Vitamin C (Multi-Delyn Liquid)  5 ml PO DAILY [facility]


   Last Admin: 01/05/16 09:33 Dose:  5 ml


Nitroglycerin (Nitro-Bid 2% Oint)  2 in TOP Q6 [facility]


   Last Admin: 01/06/16 04:17 Dose:  2 in











- Labs


Labs (last 24 hours): 


 Laboratory Results - last 24 hr











  01/05/16 01/05/16 01/05/16





  11:13 16:38 21:42


 


WBC   


 


RBC   


 


Hgb   


 


Hct   


 


MCV   


 


MCH   


 


MCHC   


 


RDW   


 


Plt Count   


 


MPV   


 


Neut % (Auto)   


 


Lymph % (Auto)   


 


Mono % (Auto)   


 


Eos % (Auto)   


 


Baso % (Auto)   


 


Neut #   


 


Lymph #   


 


Mono #   


 


Eos #   


 


Baso #   


 


POC Glucose (mg/dL)  102  88  97














  01/06/16





  04:30


 


WBC  12.2 H


 


RBC  3.25 L


 


Hgb  10.4 L


 


Hct  32.1 L


 


MCV  98.9 H


 


MCH  32.1 H


 


MCHC  32.5 L


 


RDW  13.2


 


Plt Count  383


 


MPV  7.5


 


Neut % (Auto)  69.3


 


Lymph % (Auto)  16.8 L


 


Mono % (Auto)  7.4


 


Eos % (Auto)  6.1 H


 


Baso % (Auto)  0.4


 


Neut #  8.5 H


 


Lymph #  2.1


 


Mono #  0.9 H


 


Eos #  0.7


 


Baso #  0.1


 


POC Glucose (mg/dL) 














- Constitutional


Appears: Well, Non-toxic, No Acute Distress





- Head Exam


Head Exam: ATRAUMATIC, NORMAL INSPECTION, NORMOCEPHALIC





- Eye Exam


Eye Exam: EOMI





- Respiratory Exam


Respiratory Exam: Clear to PA & Lateral, NORMAL BREATHING PATTERN, UNREMARKABLE





- Cardiovascular Exam


Cardiovascular Exam: REGULAR RHYTHM, RRR





- GI/Abdominal Exam


GI & Abdominal Exam: Normal Bowel Sounds, Soft, Unremarkable





- Extremities Exam


Extremities exam: full ROM, normal capillary refill, normal inspection, pedal 

pulses present





- Neurological Exam


Neurological exam: Abnormal Gait, Alert, CN II-XII Intact, Reflexes Normal





- Psychiatric Exam


Psychiatric exam: Flat Affect





- Skin


Skin Exam: Dry, Normal Color, Warm





Assessment/Plan


(1) Cardiac arrest


Current Visit: Yes   Status: Acute


Comment: Anoxic brain injury


Cont. ASA,  Vasotec and Lopressor


no cardio interventions planned at present.


BRITTA


cont guardianship process








(2) DVT prophylaxis


Current Visit: Yes   Status: Acute


Comment: SCDs and sage renew lovenox








(3) Scrotal edema


Current Visit: Yes   Status: Chronic


Comment: scrotal us-noted


urology consult-dr cortes to see pt when stable








(4) History of seizure


Current Visit: Yes   Status: Chronic


Comment: Cont. keppra 500 mg BID


Seizure Precatuion








(5) Diabetes mellitus with hyperglycemia


Current Visit: Yes   Status: Chronic


Comment: riss per protocol


much more controlled, will cont ot monitor








(6) Agitation


Current Visit: Yes   Status: Acute


Comment: 1:1


restraints required to be replaced


ativan prn








(7) Aspiration pneumonia


Current Visit: Yes   Status: Acute


Comment: CXR improving-repeat ordered for am


cont IV Cefapime


BD NEBS Q 6H PRN


psudomonas in trach asp


ID f/u appretiated


?? ability to d/c anbx


cxr improving








(8) Acute respiratory failure


Current Visit: Yes   Status: Acute


Comment: penidng cxr results this am-overall lungs improving


CXR 12/28/15 


Cardiomegaly. 


Improving vascular congestion. 


Slightly improved patchy infiltrate left mid and lower lung field.  Minor mild 

increased density right lung base


small pleural effusion left upper arm

## 2024-03-05 NOTE — CP.PCM.PN
Subjective





- Date & Time of Evaluation


Date of Evaluation: 02/03/16


Time of Evaluation: 07:33





- Subjective


Subjective: 


calm and cooperative. no distress, no complaints. pending guardian ship. no f/c

, n/v/d.


ros negative except for intermittent agitation





Objective





- Vital Signs/Intake and Output


Vital Signs (last 24 hours): 


 











Temp Pulse Resp BP Pulse Ox


 


 97.8 F   72   20   132/67   99 


 


 02/02/16 23:49  02/02/16 23:49  02/02/16 23:49  02/02/16 23:49  02/02/16 23:49











- Medications


Medications: 


 Current Medications





Aspirin (Ecotrin)  81 mg PO DAILY [facility]


   Last Admin: 02/02/16 08:46 Dose:  81 mg


Enalapril Maleate (Vasotec)  10 mg PO DAILY [facility]


   Last Admin: 02/02/16 08:46 Dose:  10 mg


Enoxaparin Sodium (Lovenox)  40 mg SC DAILY [facility]


   Last Admin: 02/02/16 08:46 Dose:  40 mg


Famotidine (Pepcid)  20 mg GT BID [facility]


   Last Admin: 02/02/16 16:01 Dose:  20 mg


Haloperidol Lactate (Haldol)  2.5 mg IM Q6 PRN


   PRN Reason: Agitation


   Last Admin: 01/27/16 21:15 Dose:  2.5 mg


Levetiracetam (Keppra)  500 mg PO BID [facility]


   Last Admin: 02/02/16 16:01 Dose:  500 mg


Lorazepam (Ativan)  1 mg IVP Q4 PRN


   PRN Reason: Agitation


   Last Admin: 01/29/16 14:44 Dose:  1 mg


Metoprolol Tartrate (Lopressor)  100 mg PO Q12 [facility]


   Last Admin: 02/02/16 20:52 Dose:  100 mg


Multivitamins/Vitamin C (Multi-Delyn Liquid)  5 ml PO DAILY [facility]


   Last Admin: 02/02/16 08:45 Dose:  5 ml


Nicotine (Nicoderm Cq)  1 patch TD DAILY [facility]


   Last Admin: 02/02/16 08:46 Dose:  1 patch


Quetiapine Fumarate (Seroquel)  25 mg PO DAILY@1700 [facility]


   Last Admin: 02/02/16 16:01 Dose:  25 mg











- Labs


Labs: 


 





 01/29/16 04:15 





 01/29/16 04:15 





 











PT  11.2 SECONDS (9.4-12.0)   12/14/15  05:20    


 


INR  1.05  (0.89-1.20)   12/14/15  05:20    


 


APTT  36.2 SECONDS (23.1-32.0)  H  12/14/15  05:20    














- Constitutional


Appears: Well, Non-toxic, No Acute Distress, Chronically Ill





- Head Exam


Head Exam: ATRAUMATIC, NORMAL INSPECTION, NORMOCEPHALIC





- Eye Exam


Eye Exam: EOMI, Normal appearance, PERRL


Pupil Exam: NORMAL ACCOMODATION, PERRL





- ENT Exam


ENT Exam: Mucous Membranes Moist, Normal Exam





- Neck Exam


Neck Exam: Full ROM, Normal Inspection.  absent: Lymphadenopathy





- Respiratory Exam


Respiratory Exam: Clear to Ausculation Bilateral, NORMAL BREATHING PATTERN





- Cardiovascular Exam


Cardiovascular Exam: REGULAR RHYTHM, +S1, +S2.  absent: Murmur





- GI/Abdominal Exam


GI & Abdominal Exam: Soft, Normal Bowel Sounds.  absent: Tenderness





- Extremities Exam


Extremities Exam: Full ROM, Normal Capillary Refill, Normal Inspection.  absent

: Joint Swelling, Pedal Edema





- Back Exam


Back Exam: NORMAL INSPECTION





- Neurological Exam


Neurological Exam: Alert, Awake, CN II-XII Intact, Normal Gait, Oriented x3





- Psychiatric Exam


Psychiatric exam: Normal Affect, Normal Mood





- Skin


Skin Exam: Dry, Intact, Normal Color, Warm





Assessment and Plan


(1) DVT prophylaxis


Assessment & Plan: 


scd jazmín e hsoe, lovenox


Status: Acute





(2) Scrotal edema


Status: Chronic





(3) Diabetes mellitus with hyperglycemia


Assessment & Plan: 


fsbg daily


Status: Chronic





(4) Agitation


Assessment & Plan: 


1:1


ativan/haldol prn


seroquel


Status: Acute





(5) CAD (coronary artery disease)


Assessment & Plan: 


cotn all meds


cardio


Status: Acute





(6) Smoking


Assessment & Plan: 


nicoderm


Status: Acute   Caridad Trimble PA-C  03/05/24 [id number]       Caridad Trimble PA-C  03/05/24 [id number]